# Patient Record
Sex: MALE | Race: WHITE | Employment: OTHER
[De-identification: names, ages, dates, MRNs, and addresses within clinical notes are randomized per-mention and may not be internally consistent; named-entity substitution may affect disease eponyms.]

---

## 2017-01-02 DIAGNOSIS — I50.32 CHRONIC DIASTOLIC CONGESTIVE HEART FAILURE (HCC): ICD-10-CM

## 2017-01-03 DIAGNOSIS — N40.1 BENIGN NON-NODULAR PROSTATIC HYPERPLASIA WITH LOWER URINARY TRACT SYMPTOMS: ICD-10-CM

## 2017-01-03 DIAGNOSIS — E55.9 VITAMIN D DEFICIENCY: ICD-10-CM

## 2017-01-03 DIAGNOSIS — D50.0 IRON DEFICIENCY ANEMIA DUE TO CHRONIC BLOOD LOSS: ICD-10-CM

## 2017-01-03 RX ORDER — PNV NO.95/FERROUS FUM/FOLIC AC 28MG-0.8MG
1 TABLET ORAL DAILY
Qty: 30 TABLET | Refills: 0 | Status: CANCELLED | OUTPATIENT
Start: 2017-01-03

## 2017-01-03 RX ORDER — CHOLECALCIFEROL (VITAMIN D3) 50 MCG
2000 TABLET ORAL DAILY
Qty: 30 TABLET | Refills: 0 | OUTPATIENT
Start: 2017-01-03

## 2017-01-03 RX ORDER — TAMSULOSIN HYDROCHLORIDE 0.4 MG/1
CAPSULE ORAL
Qty: 30 CAPSULE | Refills: 0 | Status: CANCELLED | OUTPATIENT
Start: 2017-01-03

## 2017-01-03 RX ORDER — CHOLECALCIFEROL (VITAMIN D3) 50 MCG
2000 TABLET ORAL DAILY
Qty: 30 TABLET | Refills: 0 | Status: CANCELLED | OUTPATIENT
Start: 2017-01-03

## 2017-01-03 RX ORDER — PNV NO.95/FERROUS FUM/FOLIC AC 28MG-0.8MG
1 TABLET ORAL DAILY
Qty: 90 TABLET | Refills: 3 | Status: SHIPPED | OUTPATIENT
Start: 2017-01-03 | End: 2017-01-27 | Stop reason: SDUPTHER

## 2017-01-03 RX ORDER — TAMSULOSIN HYDROCHLORIDE 0.4 MG/1
CAPSULE ORAL
Qty: 90 CAPSULE | Refills: 3 | Status: SHIPPED | OUTPATIENT
Start: 2017-01-03 | End: 2017-01-04

## 2017-01-04 ENCOUNTER — PATIENT MESSAGE (OUTPATIENT)
Dept: FAMILY MEDICINE CLINIC | Facility: CLINIC | Age: 52
End: 2017-01-04

## 2017-01-04 DIAGNOSIS — R39.9 LOWER URINARY TRACT SYMPTOMS (LUTS): Primary | ICD-10-CM

## 2017-01-09 ENCOUNTER — PATIENT MESSAGE (OUTPATIENT)
Dept: FAMILY MEDICINE CLINIC | Facility: CLINIC | Age: 52
End: 2017-01-09

## 2017-01-09 ENCOUNTER — TELEPHONE (OUTPATIENT)
Dept: FAMILY MEDICINE CLINIC | Facility: CLINIC | Age: 52
End: 2017-01-09

## 2017-01-09 DIAGNOSIS — F17.200 SMOKES AND MOTIVATED TO QUIT: Primary | ICD-10-CM

## 2017-01-09 DIAGNOSIS — R39.9 LOWER URINARY TRACT SYMPTOMS (LUTS): Primary | ICD-10-CM

## 2017-01-09 RX ORDER — VARENICLINE TARTRATE 25 MG
KIT ORAL
Qty: 53 EACH | Refills: 0 | Status: SHIPPED | OUTPATIENT
Start: 2017-01-09 | End: 2017-03-23 | Stop reason: ALTCHOICE

## 2017-01-10 PROBLEM — H90.6 MIXED CONDUCTIVE AND SENSORINEURAL HEARING LOSS OF BOTH EARS: Status: ACTIVE | Noted: 2017-01-10

## 2017-01-10 PROBLEM — H93.13 TINNITUS OF BOTH EARS: Status: ACTIVE | Noted: 2017-01-10

## 2017-01-16 DIAGNOSIS — Z29.9 PREVENTIVE MEASURE: ICD-10-CM

## 2017-01-16 DIAGNOSIS — H69.83 EUSTACHIAN TUBE DYSFUNCTION, BILATERAL: ICD-10-CM

## 2017-01-16 RX ORDER — CLOPIDOGREL BISULFATE 75 MG/1
TABLET ORAL
Qty: 90 TABLET | Refills: 3 | Status: SHIPPED | OUTPATIENT
Start: 2017-01-16 | End: 2018-01-28 | Stop reason: SDUPTHER

## 2017-01-16 RX ORDER — FLUTICASONE PROPIONATE 50 MCG
2 SPRAY, SUSPENSION (ML) NASAL DAILY
Qty: 1 BOTTLE | Refills: 3 | Status: SHIPPED | OUTPATIENT
Start: 2017-01-16

## 2017-01-18 DIAGNOSIS — G89.29 CHRONIC MIDLINE LOW BACK PAIN WITH LEFT-SIDED SCIATICA: ICD-10-CM

## 2017-01-18 DIAGNOSIS — M54.42 CHRONIC MIDLINE LOW BACK PAIN WITH LEFT-SIDED SCIATICA: ICD-10-CM

## 2017-01-18 RX ORDER — OXYCODONE HYDROCHLORIDE 10 MG/1
10 TABLET ORAL EVERY 8 HOURS PRN
Qty: 90 TABLET | Refills: 0 | Status: SHIPPED | OUTPATIENT
Start: 2017-01-18 | End: 2017-02-16 | Stop reason: SDUPTHER

## 2017-01-20 ENCOUNTER — PATIENT MESSAGE (OUTPATIENT)
Dept: FAMILY MEDICINE CLINIC | Facility: CLINIC | Age: 52
End: 2017-01-20

## 2017-01-22 ENCOUNTER — PATIENT MESSAGE (OUTPATIENT)
Dept: FAMILY MEDICINE CLINIC | Facility: CLINIC | Age: 52
End: 2017-01-22

## 2017-01-22 DIAGNOSIS — G47.01 INSOMNIA DUE TO MEDICAL CONDITION: ICD-10-CM

## 2017-01-22 DIAGNOSIS — F41.9 ANXIETY: ICD-10-CM

## 2017-01-22 RX ORDER — LORAZEPAM 1 MG/1
1 TABLET ORAL NIGHTLY PRN
Qty: 30 TABLET | Refills: 0 | Status: SHIPPED | OUTPATIENT
Start: 2017-01-22 | End: 2017-02-20 | Stop reason: SDUPTHER

## 2017-01-27 DIAGNOSIS — G47.01 INSOMNIA DUE TO MEDICAL CONDITION: ICD-10-CM

## 2017-01-27 DIAGNOSIS — F41.9 ANXIETY: ICD-10-CM

## 2017-01-27 DIAGNOSIS — D50.0 IRON DEFICIENCY ANEMIA DUE TO CHRONIC BLOOD LOSS: ICD-10-CM

## 2017-01-27 DIAGNOSIS — I50.32 CHRONIC DIASTOLIC CONGESTIVE HEART FAILURE (HCC): ICD-10-CM

## 2017-01-27 DIAGNOSIS — F33.1 MODERATE RECURRENT MAJOR DEPRESSION (HCC): ICD-10-CM

## 2017-01-27 DIAGNOSIS — E55.9 VITAMIN D DEFICIENCY: ICD-10-CM

## 2017-01-27 RX ORDER — FLUOXETINE HYDROCHLORIDE 20 MG/1
20 CAPSULE ORAL EVERY MORNING
Qty: 90 CAPSULE | Refills: 3 | Status: SHIPPED | OUTPATIENT
Start: 2017-01-27 | End: 2017-06-23 | Stop reason: ALTCHOICE

## 2017-01-27 RX ORDER — PNV NO.95/FERROUS FUM/FOLIC AC 28MG-0.8MG
1 TABLET ORAL DAILY
Qty: 90 TABLET | Refills: 3 | Status: SHIPPED | OUTPATIENT
Start: 2017-01-27 | End: 2017-11-17 | Stop reason: SDUPTHER

## 2017-01-27 RX ORDER — SPIRONOLACTONE 50 MG/1
50 TABLET, FILM COATED ORAL DAILY
Qty: 90 TABLET | Refills: 3 | Status: SHIPPED | OUTPATIENT
Start: 2017-01-27 | End: 2018-03-12 | Stop reason: SDUPTHER

## 2017-02-02 ENCOUNTER — TELEPHONE (OUTPATIENT)
Dept: FAMILY MEDICINE CLINIC | Facility: CLINIC | Age: 52
End: 2017-02-02

## 2017-02-02 ENCOUNTER — TELEPHONE (OUTPATIENT)
Dept: UROLOGY | Facility: CLINIC | Age: 52
End: 2017-02-02

## 2017-02-03 ENCOUNTER — TELEPHONE (OUTPATIENT)
Dept: UROLOGY | Facility: CLINIC | Age: 52
End: 2017-02-03

## 2017-02-03 ENCOUNTER — PATIENT MESSAGE (OUTPATIENT)
Dept: FAMILY MEDICINE CLINIC | Facility: CLINIC | Age: 52
End: 2017-02-03

## 2017-02-10 ENCOUNTER — HOSPITAL ENCOUNTER (OUTPATIENT)
Age: 52
Discharge: HOME | End: 2017-02-10
Payer: MEDICAID

## 2017-02-10 ENCOUNTER — HOSPITAL ENCOUNTER (OUTPATIENT)
Dept: GENERAL RADIOLOGY | Age: 52
Discharge: HOME | End: 2017-02-10
Payer: MEDICAID

## 2017-02-10 DIAGNOSIS — Z01.811 PRE-OP CHEST EXAM: ICD-10-CM

## 2017-02-10 PROCEDURE — 71020 XR CHEST STANDARD TWO VW: CPT

## 2017-02-16 DIAGNOSIS — G89.29 CHRONIC MIDLINE LOW BACK PAIN WITH LEFT-SIDED SCIATICA: ICD-10-CM

## 2017-02-16 DIAGNOSIS — M54.42 CHRONIC MIDLINE LOW BACK PAIN WITH LEFT-SIDED SCIATICA: ICD-10-CM

## 2017-02-16 RX ORDER — OXYCODONE HYDROCHLORIDE 10 MG/1
10 TABLET ORAL EVERY 8 HOURS PRN
Qty: 90 TABLET | Refills: 0 | Status: SHIPPED | OUTPATIENT
Start: 2017-02-16 | End: 2017-03-16 | Stop reason: SDUPTHER

## 2017-02-20 DIAGNOSIS — G47.01 INSOMNIA DUE TO MEDICAL CONDITION: ICD-10-CM

## 2017-02-20 DIAGNOSIS — F41.9 ANXIETY: ICD-10-CM

## 2017-02-20 RX ORDER — LORAZEPAM 1 MG/1
1 TABLET ORAL NIGHTLY PRN
Qty: 30 TABLET | Refills: 0 | Status: SHIPPED | OUTPATIENT
Start: 2017-02-20 | End: 2017-03-16 | Stop reason: SDUPTHER

## 2017-03-09 ENCOUNTER — HOSPITAL ENCOUNTER (OUTPATIENT)
Dept: OTHER | Age: 52
Discharge: HOME | End: 2017-03-09
Payer: MEDICAID

## 2017-03-09 VITALS
OXYGEN SATURATION: 97 % | DIASTOLIC BLOOD PRESSURE: 80 MMHG | WEIGHT: 315 LBS | SYSTOLIC BLOOD PRESSURE: 130 MMHG | RESPIRATION RATE: 20 BRPM | HEART RATE: 76 BPM | BODY MASS INDEX: 47.98 KG/M2

## 2017-03-09 LAB
ANION GAP SERPL CALCULATED.3IONS-SCNC: 13 MMOL/L (ref 9–17)
BNP INTERPRETATION: NORMAL
BUN BLDV-MCNC: 28 MG/DL (ref 6–20)
CHLORIDE BLD-SCNC: 99 MMOL/L (ref 98–107)
CO2: 27 MMOL/L (ref 20–31)
CREAT SERPL-MCNC: 1.32 MG/DL (ref 0.7–1.2)
GFR AFRICAN AMERICAN: >60 ML/MIN
GFR NON-AFRICAN AMERICAN: 57 ML/MIN
GFR SERPL CREATININE-BSD FRML MDRD: ABNORMAL ML/MIN/{1.73_M2}
GFR SERPL CREATININE-BSD FRML MDRD: ABNORMAL ML/MIN/{1.73_M2}
POTASSIUM SERPL-SCNC: 4.4 MMOL/L (ref 3.7–5.3)
PRO-BNP: 59 PG/ML
SODIUM BLD-SCNC: 139 MMOL/L (ref 135–144)

## 2017-03-09 PROCEDURE — 80051 ELECTROLYTE PANEL: CPT

## 2017-03-09 PROCEDURE — 36415 COLL VENOUS BLD VENIPUNCTURE: CPT

## 2017-03-09 PROCEDURE — 82565 ASSAY OF CREATININE: CPT

## 2017-03-09 PROCEDURE — 83880 ASSAY OF NATRIURETIC PEPTIDE: CPT

## 2017-03-09 PROCEDURE — 99211 OFF/OP EST MAY X REQ PHY/QHP: CPT

## 2017-03-09 PROCEDURE — 84520 ASSAY OF UREA NITROGEN: CPT

## 2017-03-16 DIAGNOSIS — G89.29 CHRONIC MIDLINE LOW BACK PAIN WITH LEFT-SIDED SCIATICA: ICD-10-CM

## 2017-03-16 DIAGNOSIS — E11.40 TYPE 2 DIABETES MELLITUS WITH DIABETIC NEUROPATHY, WITH LONG-TERM CURRENT USE OF INSULIN (HCC): ICD-10-CM

## 2017-03-16 DIAGNOSIS — G47.01 INSOMNIA DUE TO MEDICAL CONDITION: ICD-10-CM

## 2017-03-16 DIAGNOSIS — M54.42 CHRONIC MIDLINE LOW BACK PAIN WITH LEFT-SIDED SCIATICA: ICD-10-CM

## 2017-03-16 DIAGNOSIS — Z79.4 TYPE 2 DIABETES MELLITUS WITH DIABETIC NEUROPATHY, WITH LONG-TERM CURRENT USE OF INSULIN (HCC): ICD-10-CM

## 2017-03-16 DIAGNOSIS — K21.9 GASTROESOPHAGEAL REFLUX DISEASE WITHOUT ESOPHAGITIS: ICD-10-CM

## 2017-03-16 DIAGNOSIS — F41.9 ANXIETY: ICD-10-CM

## 2017-03-16 RX ORDER — OXYCODONE HYDROCHLORIDE 10 MG/1
10 TABLET ORAL EVERY 8 HOURS PRN
Qty: 10 TABLET | Refills: 0 | Status: SHIPPED | OUTPATIENT
Start: 2017-03-16 | End: 2017-03-19

## 2017-03-16 RX ORDER — PANTOPRAZOLE SODIUM 40 MG/1
40 TABLET, DELAYED RELEASE ORAL NIGHTLY
Qty: 90 TABLET | Refills: 1 | Status: SHIPPED | OUTPATIENT
Start: 2017-03-16 | End: 2017-06-09 | Stop reason: SDUPTHER

## 2017-03-16 RX ORDER — LORAZEPAM 1 MG/1
1 TABLET ORAL NIGHTLY PRN
Qty: 4 TABLET | Refills: 0 | Status: SHIPPED | OUTPATIENT
Start: 2017-03-16 | End: 2017-03-23 | Stop reason: SDUPTHER

## 2017-03-16 RX ORDER — GABAPENTIN 300 MG/1
300 CAPSULE ORAL 3 TIMES DAILY
Qty: 90 CAPSULE | Refills: 1 | Status: SHIPPED | OUTPATIENT
Start: 2017-03-16 | End: 2017-06-23 | Stop reason: ALTCHOICE

## 2017-03-20 ENCOUNTER — PATIENT MESSAGE (OUTPATIENT)
Dept: FAMILY MEDICINE CLINIC | Age: 52
End: 2017-03-20

## 2017-03-20 DIAGNOSIS — M54.42 CHRONIC MIDLINE LOW BACK PAIN WITH LEFT-SIDED SCIATICA: ICD-10-CM

## 2017-03-20 DIAGNOSIS — G89.29 CHRONIC MIDLINE LOW BACK PAIN WITH LEFT-SIDED SCIATICA: ICD-10-CM

## 2017-03-20 RX ORDER — OXYCODONE HYDROCHLORIDE 10 MG/1
10 TABLET ORAL EVERY 8 HOURS PRN
Qty: 90 TABLET | Refills: 0 | Status: SHIPPED | OUTPATIENT
Start: 2017-03-20 | End: 2017-04-17 | Stop reason: SDUPTHER

## 2017-03-23 ENCOUNTER — HOSPITAL ENCOUNTER (OUTPATIENT)
Age: 52
Discharge: HOME | End: 2017-03-23
Payer: COMMERCIAL

## 2017-03-23 ENCOUNTER — HOSPITAL ENCOUNTER (OUTPATIENT)
Dept: GENERAL RADIOLOGY | Age: 52
Discharge: HOME | End: 2017-03-23
Payer: COMMERCIAL

## 2017-03-23 ENCOUNTER — OFFICE VISIT (OUTPATIENT)
Dept: FAMILY MEDICINE CLINIC | Age: 52
End: 2017-03-23
Payer: COMMERCIAL

## 2017-03-23 VITALS
WEIGHT: 315 LBS | DIASTOLIC BLOOD PRESSURE: 62 MMHG | SYSTOLIC BLOOD PRESSURE: 138 MMHG | HEIGHT: 72 IN | BODY MASS INDEX: 42.66 KG/M2 | TEMPERATURE: 98.3 F | OXYGEN SATURATION: 93 % | HEART RATE: 86 BPM

## 2017-03-23 DIAGNOSIS — G47.01 INSOMNIA DUE TO MEDICAL CONDITION: ICD-10-CM

## 2017-03-23 DIAGNOSIS — E11.40 TYPE 2 DIABETES MELLITUS WITH DIABETIC NEUROPATHY, WITH LONG-TERM CURRENT USE OF INSULIN (HCC): ICD-10-CM

## 2017-03-23 DIAGNOSIS — Z79.4 TYPE 2 DIABETES MELLITUS WITH DIABETIC NEUROPATHY, WITH LONG-TERM CURRENT USE OF INSULIN (HCC): ICD-10-CM
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
## 2017-03-23 DIAGNOSIS — I50.32 CHRONIC DIASTOLIC CONGESTIVE HEART FAILURE (HCC): ICD-10-CM

## 2017-03-23 DIAGNOSIS — M54.9 CHRONIC BACK PAIN GREATER THAN 3 MONTHS DURATION: ICD-10-CM

## 2017-03-23 DIAGNOSIS — M75.02 ADHESIVE CAPSULITIS OF LEFT SHOULDER: Primary | ICD-10-CM

## 2017-03-23 DIAGNOSIS — I10 ESSENTIAL HYPERTENSION: ICD-10-CM

## 2017-03-23 DIAGNOSIS — M75.02 ADHESIVE CAPSULITIS OF LEFT SHOULDER: ICD-10-CM

## 2017-03-23 DIAGNOSIS — Z23 NEED FOR TDAP VACCINATION: ICD-10-CM

## 2017-03-23 DIAGNOSIS — K59.01 SLOW TRANSIT CONSTIPATION: ICD-10-CM

## 2017-03-23 DIAGNOSIS — N18.30 CKD (CHRONIC KIDNEY DISEASE) STAGE 3, GFR 30-59 ML/MIN (HCC): ICD-10-CM

## 2017-03-23 DIAGNOSIS — M48.061 STENOSIS, SPINAL, LUMBAR: ICD-10-CM

## 2017-03-23 DIAGNOSIS — F41.9 ANXIETY: ICD-10-CM

## 2017-03-23 DIAGNOSIS — G89.29 CHRONIC BACK PAIN GREATER THAN 3 MONTHS DURATION: ICD-10-CM

## 2017-03-23 LAB
ANION GAP SERPL CALCULATED.3IONS-SCNC: 16 MMOL/L (ref 9–17)
BUN BLDV-MCNC: 24 MG/DL (ref 6–20)
BUN/CREAT BLD: ABNORMAL (ref 9–20)
CALCIUM SERPL-MCNC: 8.7 MG/DL (ref 8.6–10.4)
CHLORIDE BLD-SCNC: 98 MMOL/L (ref 98–107)
CO2: 25 MMOL/L (ref 20–31)
CREAT SERPL-MCNC: 1.32 MG/DL (ref 0.7–1.2)
GFR AFRICAN AMERICAN: >60 ML/MIN
GFR NON-AFRICAN AMERICAN: 57 ML/MIN
GFR SERPL CREATININE-BSD FRML MDRD: ABNORMAL ML/MIN/{1.73_M2}
GFR SERPL CREATININE-BSD FRML MDRD: ABNORMAL ML/MIN/{1.73_M2}
GLUCOSE BLD-MCNC: 37 MG/DL (ref 70–99)
POTASSIUM SERPL-SCNC: 3.8 MMOL/L (ref 3.7–5.3)
SODIUM BLD-SCNC: 139 MMOL/L (ref 135–144)

## 2017-03-23 PROCEDURE — 99215 OFFICE O/P EST HI 40 MIN: CPT | Performed by: FAMILY MEDICINE

## 2017-03-23 PROCEDURE — 36415 COLL VENOUS BLD VENIPUNCTURE: CPT

## 2017-03-23 PROCEDURE — 73030 X-RAY EXAM OF SHOULDER: CPT

## 2017-03-23 PROCEDURE — 83036 HEMOGLOBIN GLYCOSYLATED A1C: CPT

## 2017-03-23 PROCEDURE — 80048 BASIC METABOLIC PNL TOTAL CA: CPT

## 2017-03-23 RX ORDER — LIDOCAINE 40 MG/G
CREAM TOPICAL
Qty: 76.5 G | Refills: 5 | Status: SHIPPED | OUTPATIENT
Start: 2017-03-23 | End: 2019-03-28

## 2017-03-23 RX ORDER — LORAZEPAM 1 MG/1
1 TABLET ORAL NIGHTLY PRN
Qty: 30 TABLET | Refills: 0 | Status: SHIPPED | OUTPATIENT
Start: 2017-03-23 | End: 2017-04-17 | Stop reason: SDUPTHER

## 2017-03-23 RX ORDER — DOCUSATE SODIUM 100 MG/1
100 CAPSULE, LIQUID FILLED ORAL 2 TIMES DAILY PRN
Qty: 60 CAPSULE | Refills: 3 | Status: SHIPPED | OUTPATIENT
Start: 2017-03-23 | End: 2017-07-20 | Stop reason: SDUPTHER

## 2017-03-23 ASSESSMENT — ENCOUNTER SYMPTOMS
DIARRHEA: 0
ABDOMINAL PAIN: 0
VOMITING: 0
NAUSEA: 0
WHEEZING: 0
SHORTNESS OF BREATH: 1
ABDOMINAL DISTENTION: 0
BACK PAIN: 1
CHEST TIGHTNESS: 0
COUGH: 0

## 2017-03-24 LAB
ESTIMATED AVERAGE GLUCOSE: 163 MG/DL
HBA1C MFR BLD: 7.3 % (ref 4–6)
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
## 2017-03-25 PROBLEM — K59.01 SLOW TRANSIT CONSTIPATION: Status: ACTIVE | Noted: 2017-03-25

## 2017-03-25 PROBLEM — M75.02 ADHESIVE CAPSULITIS OF LEFT SHOULDER: Status: ACTIVE | Noted: 2017-03-25

## 2017-03-25 ASSESSMENT — ENCOUNTER SYMPTOMS: CONSTIPATION: 1

## 2017-04-12 ENCOUNTER — OFFICE VISIT (OUTPATIENT)
Dept: ORTHOPEDIC SURGERY | Age: 52
End: 2017-04-12
Payer: COMMERCIAL

## 2017-04-12 VITALS — BODY MASS INDEX: 42.66 KG/M2 | WEIGHT: 315 LBS | HEIGHT: 72 IN

## 2017-04-12 DIAGNOSIS — M12.812 ROTATOR CUFF ARTHROPATHY, LEFT: Primary | ICD-10-CM

## 2017-04-12 PROCEDURE — 99203 OFFICE O/P NEW LOW 30 MIN: CPT | Performed by: ORTHOPAEDIC SURGERY

## 2017-04-17 DIAGNOSIS — G47.01 INSOMNIA DUE TO MEDICAL CONDITION: ICD-10-CM

## 2017-04-17 DIAGNOSIS — G89.29 CHRONIC MIDLINE LOW BACK PAIN WITH LEFT-SIDED SCIATICA: ICD-10-CM

## 2017-04-17 DIAGNOSIS — M54.42 CHRONIC MIDLINE LOW BACK PAIN WITH LEFT-SIDED SCIATICA: ICD-10-CM

## 2017-04-17 DIAGNOSIS — F41.9 ANXIETY: ICD-10-CM

## 2017-04-17 RX ORDER — LORAZEPAM 1 MG/1
1 TABLET ORAL NIGHTLY PRN
Qty: 30 TABLET | Refills: 0 | Status: SHIPPED | OUTPATIENT
Start: 2017-04-17 | End: 2017-05-25 | Stop reason: SDUPTHER

## 2017-04-17 RX ORDER — OXYCODONE HYDROCHLORIDE 10 MG/1
10 TABLET ORAL EVERY 8 HOURS PRN
Qty: 90 TABLET | Refills: 0 | Status: SHIPPED | OUTPATIENT
Start: 2017-04-17 | End: 2017-05-16 | Stop reason: SDUPTHER

## 2017-04-20 ENCOUNTER — HOSPITAL ENCOUNTER (OUTPATIENT)
Dept: PHYSICAL THERAPY | Age: 52
Setting detail: THERAPIES SERIES
Discharge: HOME | End: 2017-04-20
Payer: COMMERCIAL

## 2017-04-20 PROCEDURE — G8985 CARRY GOAL STATUS: HCPCS

## 2017-04-20 PROCEDURE — 97162 PT EVAL MOD COMPLEX 30 MIN: CPT

## 2017-04-20 PROCEDURE — G8984 CARRY CURRENT STATUS: HCPCS

## 2017-04-20 ASSESSMENT — PAIN DESCRIPTION - ORIENTATION: ORIENTATION: ANTERIOR;LEFT

## 2017-04-20 ASSESSMENT — PAIN DESCRIPTION - LOCATION: LOCATION: SHOULDER

## 2017-04-20 ASSESSMENT — PAIN SCALES - GENERAL: PAINLEVEL_OUTOF10: 7

## 2017-04-24 ENCOUNTER — APPOINTMENT (OUTPATIENT)
Dept: CT IMAGING | Age: 52
End: 2017-04-24
Payer: COMMERCIAL

## 2017-04-24 ENCOUNTER — HOSPITAL ENCOUNTER (EMERGENCY)
Age: 52
Discharge: HOME | End: 2017-04-24
Attending: EMERGENCY MEDICINE
Payer: COMMERCIAL

## 2017-04-24 VITALS
BODY MASS INDEX: 42.66 KG/M2 | HEART RATE: 92 BPM | WEIGHT: 315 LBS | OXYGEN SATURATION: 94 % | RESPIRATION RATE: 16 BRPM | TEMPERATURE: 97.8 F | SYSTOLIC BLOOD PRESSURE: 113 MMHG | HEIGHT: 72 IN | DIASTOLIC BLOOD PRESSURE: 50 MMHG

## 2017-04-24 DIAGNOSIS — K21.9 GASTROESOPHAGEAL REFLUX DISEASE WITHOUT ESOPHAGITIS: ICD-10-CM

## 2017-04-24 DIAGNOSIS — S09.90XA HEAD INJURY, INITIAL ENCOUNTER: Primary | ICD-10-CM

## 2017-04-24 PROCEDURE — 70450 CT HEAD/BRAIN W/O DYE: CPT

## 2017-04-24 PROCEDURE — 99284 EMERGENCY DEPT VISIT MOD MDM: CPT

## 2017-04-24 PROCEDURE — 6370000000 HC RX 637 (ALT 250 FOR IP): Performed by: EMERGENCY MEDICINE

## 2017-04-24 RX ORDER — MAGNESIUM HYDROXIDE/ALUMINUM HYDROXICE/SIMETHICONE 120; 1200; 1200 MG/30ML; MG/30ML; MG/30ML
15 SUSPENSION ORAL
Status: COMPLETED | OUTPATIENT
Start: 2017-04-24 | End: 2017-04-24

## 2017-04-24 RX ORDER — OXYCODONE HYDROCHLORIDE AND ACETAMINOPHEN 5; 325 MG/1; MG/1
2 TABLET ORAL ONCE
Status: COMPLETED | OUTPATIENT
Start: 2017-04-24 | End: 2017-04-24

## 2017-04-24 RX ADMIN — OXYCODONE HYDROCHLORIDE AND ACETAMINOPHEN 2 TABLET: 5; 325 TABLET ORAL at 11:05

## 2017-04-24 RX ADMIN — ALUMINUM HYDROXIDE, MAGNESIUM HYDROXIDE, AND SIMETHICONE 15 ML: 200; 200; 20 SUSPENSION ORAL at 11:05

## 2017-04-24 RX ADMIN — LIDOCAINE HYDROCHLORIDE 15 ML: 20 SOLUTION ORAL; TOPICAL at 11:05

## 2017-04-24 ASSESSMENT — ENCOUNTER SYMPTOMS
DIARRHEA: 0
NAUSEA: 0
COUGH: 0
COLOR CHANGE: 0
VOMITING: 0
EYE DISCHARGE: 0
ABDOMINAL PAIN: 1
WHEEZING: 0
BLOOD IN STOOL: 0
FACIAL SWELLING: 0
SHORTNESS OF BREATH: 0
EYE PAIN: 0
SINUS PRESSURE: 0
BACK PAIN: 0
EYE REDNESS: 0
CHEST TIGHTNESS: 0
TROUBLE SWALLOWING: 0
CONSTIPATION: 0
RHINORRHEA: 0
SORE THROAT: 0

## 2017-04-24 ASSESSMENT — PAIN SCALES - GENERAL
PAINLEVEL_OUTOF10: 9
PAINLEVEL_OUTOF10: 9

## 2017-04-25 ENCOUNTER — PATIENT MESSAGE (OUTPATIENT)
Dept: FAMILY MEDICINE CLINIC | Age: 52
End: 2017-04-25

## 2017-04-27 ENCOUNTER — HOSPITAL ENCOUNTER (OUTPATIENT)
Dept: GENERAL RADIOLOGY | Age: 52
Discharge: HOME | End: 2017-04-27
Payer: COMMERCIAL

## 2017-04-27 ENCOUNTER — OFFICE VISIT (OUTPATIENT)
Dept: FAMILY MEDICINE CLINIC | Age: 52
End: 2017-04-27
Payer: COMMERCIAL

## 2017-04-27 ENCOUNTER — HOSPITAL ENCOUNTER (OUTPATIENT)
Dept: OTHER | Age: 52
Discharge: HOME | End: 2017-04-27
Payer: COMMERCIAL

## 2017-04-27 ENCOUNTER — HOSPITAL ENCOUNTER (OUTPATIENT)
Age: 52
Discharge: HOME | End: 2017-04-27
Payer: COMMERCIAL

## 2017-04-27 VITALS
BODY MASS INDEX: 48.39 KG/M2 | SYSTOLIC BLOOD PRESSURE: 132 MMHG | RESPIRATION RATE: 16 BRPM | OXYGEN SATURATION: 96 % | HEART RATE: 77 BPM | DIASTOLIC BLOOD PRESSURE: 70 MMHG | WEIGHT: 315 LBS

## 2017-04-27 VITALS
HEIGHT: 71 IN | BODY MASS INDEX: 44.1 KG/M2 | SYSTOLIC BLOOD PRESSURE: 135 MMHG | DIASTOLIC BLOOD PRESSURE: 67 MMHG | HEART RATE: 78 BPM | TEMPERATURE: 97.6 F | WEIGHT: 315 LBS | OXYGEN SATURATION: 96 %

## 2017-04-27 DIAGNOSIS — J20.9 ACUTE BRONCHITIS DUE TO INFECTION: ICD-10-CM

## 2017-04-27 DIAGNOSIS — N18.30 CKD (CHRONIC KIDNEY DISEASE) STAGE 3, GFR 30-59 ML/MIN (HCC): ICD-10-CM

## 2017-04-27 DIAGNOSIS — R55 SYNCOPE, UNSPECIFIED SYNCOPE TYPE: ICD-10-CM

## 2017-04-27 DIAGNOSIS — J02.9 SORE THROAT: ICD-10-CM

## 2017-04-27 DIAGNOSIS — J20.9 ACUTE BRONCHITIS DUE TO INFECTION: Primary | ICD-10-CM

## 2017-04-27 DIAGNOSIS — H60.391 OTHER INFECTIVE CHRONIC OTITIS EXTERNA OF RIGHT EAR: ICD-10-CM

## 2017-04-27 DIAGNOSIS — I50.32 CHRONIC DIASTOLIC CONGESTIVE HEART FAILURE (HCC): ICD-10-CM

## 2017-04-27 LAB
ALBUMIN SERPL-MCNC: 4.1 G/DL (ref 3.5–5.2)
ALBUMIN/GLOBULIN RATIO: ABNORMAL (ref 1–2.5)
ALP BLD-CCNC: 82 U/L (ref 40–129)
ALT SERPL-CCNC: 15 U/L (ref 5–41)
ANION GAP SERPL CALCULATED.3IONS-SCNC: 13 MMOL/L (ref 9–17)
ANION GAP SERPL CALCULATED.3IONS-SCNC: 16 MMOL/L (ref 9–17)
AST SERPL-CCNC: 14 U/L
BILIRUB SERPL-MCNC: 0.62 MG/DL (ref 0.3–1.2)
BNP INTERPRETATION: NORMAL
BUN BLDV-MCNC: 26 MG/DL (ref 6–20)
BUN BLDV-MCNC: 26 MG/DL (ref 6–20)
BUN/CREAT BLD: ABNORMAL (ref 9–20)
CALCIUM SERPL-MCNC: 9.7 MG/DL (ref 8.6–10.4)
CHLORIDE BLD-SCNC: 96 MMOL/L (ref 98–107)
CHLORIDE BLD-SCNC: 96 MMOL/L (ref 98–107)
CO2: 27 MMOL/L (ref 20–31)
CO2: 29 MMOL/L (ref 20–31)
CREAT SERPL-MCNC: 1.42 MG/DL (ref 0.7–1.2)
CREAT SERPL-MCNC: 1.47 MG/DL (ref 0.7–1.2)
GFR AFRICAN AMERICAN: >60 ML/MIN
GFR AFRICAN AMERICAN: >60 ML/MIN
GFR NON-AFRICAN AMERICAN: 50 ML/MIN
GFR NON-AFRICAN AMERICAN: 52 ML/MIN
GFR SERPL CREATININE-BSD FRML MDRD: ABNORMAL ML/MIN/{1.73_M2}
GLUCOSE BLD-MCNC: 104 MG/DL (ref 70–99)
HCT VFR BLD CALC: 40.4 % (ref 41–53)
HEMOGLOBIN: 13.2 G/DL (ref 13.5–17.5)
MCH RBC QN AUTO: 26.6 PG (ref 26–34)
MCHC RBC AUTO-ENTMCNC: 32.7 G/DL (ref 31–37)
MCV RBC AUTO: 81.4 FL (ref 80–100)
PDW BLD-RTO: 16 % (ref 11.5–14.9)
PLATELET # BLD: 179 K/UL (ref 150–450)
PMV BLD AUTO: 9.2 FL (ref 6–12)
POTASSIUM SERPL-SCNC: 4.2 MMOL/L (ref 3.7–5.3)
POTASSIUM SERPL-SCNC: 4.5 MMOL/L (ref 3.7–5.3)
PRO-BNP: 89 PG/ML
RBC # BLD: 4.96 M/UL (ref 4.5–5.9)
S PYO AG THROAT QL: NORMAL
SODIUM BLD-SCNC: 138 MMOL/L (ref 135–144)
SODIUM BLD-SCNC: 139 MMOL/L (ref 135–144)
TOTAL PROTEIN: 7.4 G/DL (ref 6.4–8.3)
WBC # BLD: 10 K/UL (ref 3.5–11)

## 2017-04-27 PROCEDURE — 99211 OFF/OP EST MAY X REQ PHY/QHP: CPT

## 2017-04-27 PROCEDURE — 36415 COLL VENOUS BLD VENIPUNCTURE: CPT

## 2017-04-27 PROCEDURE — 83880 ASSAY OF NATRIURETIC PEPTIDE: CPT

## 2017-04-27 PROCEDURE — 99214 OFFICE O/P EST MOD 30 MIN: CPT | Performed by: FAMILY MEDICINE

## 2017-04-27 PROCEDURE — 80053 COMPREHEN METABOLIC PANEL: CPT

## 2017-04-27 PROCEDURE — 82565 ASSAY OF CREATININE: CPT

## 2017-04-27 PROCEDURE — 84520 ASSAY OF UREA NITROGEN: CPT

## 2017-04-27 PROCEDURE — 80051 ELECTROLYTE PANEL: CPT

## 2017-04-27 PROCEDURE — 71020 XR CHEST STANDARD TWO VW: CPT

## 2017-04-27 PROCEDURE — 87880 STREP A ASSAY W/OPTIC: CPT | Performed by: FAMILY MEDICINE

## 2017-04-27 PROCEDURE — 85027 COMPLETE CBC AUTOMATED: CPT

## 2017-04-27 RX ORDER — FLUTICASONE PROPIONATE 0.05 %
CREAM (GRAM) TOPICAL 2 TIMES DAILY
COMMUNITY
Start: 2017-04-19

## 2017-04-27 RX ORDER — GUAIFENESIN AND CODEINE PHOSPHATE 100; 10 MG/5ML; MG/5ML
5 SOLUTION ORAL EVERY 4 HOURS PRN
Qty: 140 ML | Refills: 0 | Status: SHIPPED | OUTPATIENT
Start: 2017-04-27 | End: 2017-05-04

## 2017-04-27 RX ORDER — AZITHROMYCIN 250 MG/1
TABLET, FILM COATED ORAL
Qty: 6 TABLET | Refills: 0 | Status: SHIPPED | OUTPATIENT
Start: 2017-04-27 | End: 2017-05-02

## 2017-04-27 ASSESSMENT — ENCOUNTER SYMPTOMS
COUGH: 1
SORE THROAT: 1
SHORTNESS OF BREATH: 1

## 2017-04-28 PROBLEM — H60.61 CHRONIC OTITIS EXTERNA OF RIGHT EAR: Status: ACTIVE | Noted: 2017-04-28

## 2017-04-28 PROBLEM — R55 SYNCOPE: Status: ACTIVE | Noted: 2017-04-28

## 2017-04-28 ASSESSMENT — ENCOUNTER SYMPTOMS
VOMITING: 0
DIARRHEA: 0
SINUS PRESSURE: 0
ABDOMINAL DISTENTION: 0
CONSTIPATION: 0
RHINORRHEA: 0
WHEEZING: 0
BACK PAIN: 1
NAUSEA: 0
ABDOMINAL PAIN: 0
APNEA: 1
CHEST TIGHTNESS: 0

## 2017-05-01 ENCOUNTER — HOSPITAL ENCOUNTER (OUTPATIENT)
Dept: VASCULAR LAB | Age: 52
Discharge: HOME | End: 2017-05-01
Payer: COMMERCIAL

## 2017-05-01 DIAGNOSIS — R55 SYNCOPE, UNSPECIFIED SYNCOPE TYPE: ICD-10-CM

## 2017-05-01 PROCEDURE — 93880 EXTRACRANIAL BILAT STUDY: CPT

## 2017-05-02 ENCOUNTER — HOSPITAL ENCOUNTER (OUTPATIENT)
Age: 52
Discharge: HOME | End: 2017-05-02
Payer: COMMERCIAL

## 2017-05-02 DIAGNOSIS — I12.9 BENIGN HYPERTENSION WITH CKD (CHRONIC KIDNEY DISEASE) STAGE III (HCC): ICD-10-CM

## 2017-05-02 DIAGNOSIS — N18.30 SECONDARY DIABETES MELLITUS WITH STAGE 3 CHRONIC KIDNEY DISEASE (HCC): ICD-10-CM

## 2017-05-02 DIAGNOSIS — N18.30 CKD (CHRONIC KIDNEY DISEASE) STAGE 3, GFR 30-59 ML/MIN (HCC): ICD-10-CM

## 2017-05-02 DIAGNOSIS — E13.22 SECONDARY DIABETES MELLITUS WITH STAGE 3 CHRONIC KIDNEY DISEASE (HCC): ICD-10-CM

## 2017-05-02 DIAGNOSIS — N18.30 BENIGN HYPERTENSION WITH CKD (CHRONIC KIDNEY DISEASE) STAGE III (HCC): ICD-10-CM

## 2017-05-02 LAB
ABSOLUTE EOS #: 0.2 K/UL (ref 0–0.4)
ABSOLUTE LYMPH #: 1.3 K/UL (ref 1–4.8)
ABSOLUTE MONO #: 0.7 K/UL (ref 0.1–1.3)
ANION GAP SERPL CALCULATED.3IONS-SCNC: 15 MMOL/L (ref 9–17)
BASOPHILS # BLD: 1 %
BASOPHILS ABSOLUTE: 0 K/UL (ref 0–0.2)
BUN BLDV-MCNC: 30 MG/DL (ref 6–20)
BUN/CREAT BLD: ABNORMAL (ref 9–20)
CALCIUM SERPL-MCNC: 9.1 MG/DL (ref 8.6–10.4)
CHLORIDE BLD-SCNC: 96 MMOL/L (ref 98–107)
CO2: 26 MMOL/L (ref 20–31)
CREAT SERPL-MCNC: 1.4 MG/DL (ref 0.7–1.2)
DIFFERENTIAL TYPE: ABNORMAL
EOSINOPHILS RELATIVE PERCENT: 2 %
GFR AFRICAN AMERICAN: >60 ML/MIN
GFR NON-AFRICAN AMERICAN: 53 ML/MIN
GFR SERPL CREATININE-BSD FRML MDRD: ABNORMAL ML/MIN/{1.73_M2}
GFR SERPL CREATININE-BSD FRML MDRD: ABNORMAL ML/MIN/{1.73_M2}
GLUCOSE BLD-MCNC: 153 MG/DL (ref 70–99)
HCT VFR BLD CALC: 39.1 % (ref 41–53)
HEMOGLOBIN: 12.7 G/DL (ref 13.5–17.5)
LYMPHOCYTES # BLD: 13 %
MAGNESIUM: 1.8 MG/DL (ref 1.6–2.6)
MCH RBC QN AUTO: 26.7 PG (ref 26–34)
MCHC RBC AUTO-ENTMCNC: 32.5 G/DL (ref 31–37)
MCV RBC AUTO: 82.3 FL (ref 80–100)
MONOCYTES # BLD: 7 %
PDW BLD-RTO: 15.4 % (ref 11.5–14.9)
PHOSPHORUS: 4.2 MG/DL (ref 2.5–4.5)
PLATELET # BLD: 168 K/UL (ref 150–450)
PLATELET ESTIMATE: ABNORMAL
PMV BLD AUTO: 9.2 FL (ref 6–12)
POTASSIUM SERPL-SCNC: 4.3 MMOL/L (ref 3.7–5.3)
RBC # BLD: 4.75 M/UL (ref 4.5–5.9)
RBC # BLD: ABNORMAL 10*6/UL
SEG NEUTROPHILS: 77 %
SEGMENTED NEUTROPHILS ABSOLUTE COUNT: 7.6 K/UL (ref 1.3–9.1)
SODIUM BLD-SCNC: 137 MMOL/L (ref 135–144)
WBC # BLD: 9.8 K/UL (ref 3.5–11)
WBC # BLD: ABNORMAL 10*3/UL

## 2017-05-02 PROCEDURE — 86038 ANTINUCLEAR ANTIBODIES: CPT

## 2017-05-02 PROCEDURE — 84100 ASSAY OF PHOSPHORUS: CPT

## 2017-05-02 PROCEDURE — 83516 IMMUNOASSAY NONANTIBODY: CPT

## 2017-05-02 PROCEDURE — 85025 COMPLETE CBC W/AUTO DIFF WBC: CPT

## 2017-05-02 PROCEDURE — 80048 BASIC METABOLIC PNL TOTAL CA: CPT

## 2017-05-02 PROCEDURE — 36415 COLL VENOUS BLD VENIPUNCTURE: CPT

## 2017-05-02 PROCEDURE — 83735 ASSAY OF MAGNESIUM: CPT

## 2017-05-03 LAB — ANTI-NUCLEAR ANTIBODY (ANA): ABNORMAL

## 2017-05-04 LAB
ANCA MYELOPEROXIDASE: 7 AU/ML
ANCA PROTEINASE 3: 7 AU/ML
ANCA REFERENCE RANGE:: NORMAL

## 2017-05-11 ENCOUNTER — HOSPITAL ENCOUNTER (OUTPATIENT)
Dept: PHYSICAL THERAPY | Age: 52
Setting detail: THERAPIES SERIES
Discharge: HOME | End: 2017-05-11
Payer: COMMERCIAL

## 2017-05-11 PROCEDURE — G0283 ELEC STIM OTHER THAN WOUND: HCPCS

## 2017-05-11 PROCEDURE — 97110 THERAPEUTIC EXERCISES: CPT

## 2017-05-11 ASSESSMENT — PAIN SCALES - GENERAL: PAINLEVEL_OUTOF10: 10

## 2017-05-11 ASSESSMENT — PAIN DESCRIPTION - LOCATION: LOCATION: SHOULDER

## 2017-05-11 ASSESSMENT — PAIN DESCRIPTION - ORIENTATION: ORIENTATION: LEFT;ANTERIOR

## 2017-05-12 ENCOUNTER — HOSPITAL ENCOUNTER (OUTPATIENT)
Dept: PHYSICAL THERAPY | Age: 52
Setting detail: THERAPIES SERIES
Discharge: HOME | End: 2017-05-12
Payer: COMMERCIAL

## 2017-05-12 PROCEDURE — G0283 ELEC STIM OTHER THAN WOUND: HCPCS

## 2017-05-12 ASSESSMENT — PAIN SCALES - GENERAL: PAINLEVEL_OUTOF10: 10

## 2017-05-12 ASSESSMENT — PAIN DESCRIPTION - ORIENTATION: ORIENTATION: LEFT;ANTERIOR

## 2017-05-12 ASSESSMENT — PAIN DESCRIPTION - LOCATION: LOCATION: SHOULDER

## 2017-05-15 ENCOUNTER — HOSPITAL ENCOUNTER (OUTPATIENT)
Dept: PHYSICAL THERAPY | Age: 52
Setting detail: THERAPIES SERIES
Discharge: HOME | End: 2017-05-15
Payer: COMMERCIAL

## 2017-05-15 PROCEDURE — G0283 ELEC STIM OTHER THAN WOUND: HCPCS

## 2017-05-15 PROCEDURE — 97140 MANUAL THERAPY 1/> REGIONS: CPT

## 2017-05-15 PROCEDURE — 97110 THERAPEUTIC EXERCISES: CPT

## 2017-05-15 ASSESSMENT — PAIN DESCRIPTION - LOCATION: LOCATION: SHOULDER

## 2017-05-15 ASSESSMENT — PAIN SCALES - GENERAL: PAINLEVEL_OUTOF10: 10

## 2017-05-16 DIAGNOSIS — G89.29 CHRONIC MIDLINE LOW BACK PAIN WITH LEFT-SIDED SCIATICA: ICD-10-CM

## 2017-05-16 DIAGNOSIS — M54.42 CHRONIC MIDLINE LOW BACK PAIN WITH LEFT-SIDED SCIATICA: ICD-10-CM

## 2017-05-16 RX ORDER — OXYCODONE HYDROCHLORIDE 10 MG/1
10 TABLET ORAL EVERY 8 HOURS PRN
Qty: 21 TABLET | Refills: 0 | Status: SHIPPED | OUTPATIENT
Start: 2017-05-16 | End: 2017-05-22 | Stop reason: SDUPTHER

## 2017-05-18 ENCOUNTER — HOSPITAL ENCOUNTER (OUTPATIENT)
Dept: PHYSICAL THERAPY | Age: 52
Setting detail: THERAPIES SERIES
Discharge: HOME | End: 2017-05-18
Payer: COMMERCIAL

## 2017-05-18 PROCEDURE — 97140 MANUAL THERAPY 1/> REGIONS: CPT

## 2017-05-18 PROCEDURE — G0283 ELEC STIM OTHER THAN WOUND: HCPCS

## 2017-05-18 PROCEDURE — 97110 THERAPEUTIC EXERCISES: CPT

## 2017-05-18 ASSESSMENT — PAIN DESCRIPTION - LOCATION: LOCATION: SHOULDER

## 2017-05-18 ASSESSMENT — PAIN SCALES - GENERAL: PAINLEVEL_OUTOF10: 8

## 2017-05-18 ASSESSMENT — PAIN DESCRIPTION - ORIENTATION: ORIENTATION: LEFT;ANTERIOR

## 2017-05-22 ENCOUNTER — HOSPITAL ENCOUNTER (OUTPATIENT)
Dept: PHYSICAL THERAPY | Age: 52
Setting detail: THERAPIES SERIES
Discharge: HOME | End: 2017-05-22
Payer: COMMERCIAL

## 2017-05-22 DIAGNOSIS — M54.42 CHRONIC MIDLINE LOW BACK PAIN WITH LEFT-SIDED SCIATICA: ICD-10-CM

## 2017-05-22 DIAGNOSIS — G89.29 CHRONIC MIDLINE LOW BACK PAIN WITH LEFT-SIDED SCIATICA: ICD-10-CM

## 2017-05-22 DIAGNOSIS — F41.9 ANXIETY: ICD-10-CM

## 2017-05-22 DIAGNOSIS — G47.01 INSOMNIA DUE TO MEDICAL CONDITION: ICD-10-CM

## 2017-05-22 PROCEDURE — 97140 MANUAL THERAPY 1/> REGIONS: CPT

## 2017-05-22 PROCEDURE — G0283 ELEC STIM OTHER THAN WOUND: HCPCS

## 2017-05-22 PROCEDURE — 97110 THERAPEUTIC EXERCISES: CPT

## 2017-05-22 RX ORDER — OXYCODONE HYDROCHLORIDE 10 MG/1
10 TABLET ORAL EVERY 8 HOURS PRN
Qty: 21 TABLET | Refills: 0 | OUTPATIENT
Start: 2017-05-22 | End: 2017-05-29

## 2017-05-22 RX ORDER — OXYCODONE HYDROCHLORIDE 10 MG/1
10 TABLET ORAL EVERY 8 HOURS PRN
Qty: 90 TABLET | Refills: 0 | Status: SHIPPED | OUTPATIENT
Start: 2017-05-22 | End: 2017-06-21 | Stop reason: SDUPTHER

## 2017-05-22 RX ORDER — LORAZEPAM 1 MG/1
1 TABLET ORAL NIGHTLY PRN
Qty: 30 TABLET | Refills: 0 | Status: CANCELLED | OUTPATIENT
Start: 2017-05-22

## 2017-05-22 ASSESSMENT — PAIN DESCRIPTION - ORIENTATION: ORIENTATION: LEFT;ANTERIOR

## 2017-05-22 ASSESSMENT — PAIN SCALES - GENERAL: PAINLEVEL_OUTOF10: 8

## 2017-05-22 ASSESSMENT — PAIN DESCRIPTION - LOCATION: LOCATION: SHOULDER

## 2017-05-24 ENCOUNTER — HOSPITAL ENCOUNTER (OUTPATIENT)
Dept: PHYSICAL THERAPY | Age: 52
Setting detail: THERAPIES SERIES
Discharge: HOME | End: 2017-05-24
Payer: COMMERCIAL

## 2017-05-24 PROCEDURE — G0283 ELEC STIM OTHER THAN WOUND: HCPCS

## 2017-05-24 PROCEDURE — 97140 MANUAL THERAPY 1/> REGIONS: CPT

## 2017-05-24 PROCEDURE — 97110 THERAPEUTIC EXERCISES: CPT

## 2017-05-25 ENCOUNTER — HOSPITAL ENCOUNTER (OUTPATIENT)
Dept: OTHER | Age: 52
Discharge: HOME | End: 2017-05-25
Payer: COMMERCIAL

## 2017-05-25 ENCOUNTER — OFFICE VISIT (OUTPATIENT)
Dept: FAMILY MEDICINE CLINIC | Age: 52
End: 2017-05-25
Payer: COMMERCIAL

## 2017-05-25 ENCOUNTER — HOSPITAL ENCOUNTER (OUTPATIENT)
Dept: VASCULAR LAB | Age: 52
Discharge: HOME | End: 2017-05-25
Payer: COMMERCIAL

## 2017-05-25 VITALS
TEMPERATURE: 96.5 F | DIASTOLIC BLOOD PRESSURE: 68 MMHG | WEIGHT: 315 LBS | RESPIRATION RATE: 20 BRPM | SYSTOLIC BLOOD PRESSURE: 128 MMHG | BODY MASS INDEX: 42.66 KG/M2 | HEIGHT: 72 IN | OXYGEN SATURATION: 95 % | HEART RATE: 80 BPM

## 2017-05-25 VITALS
OXYGEN SATURATION: 98 % | RESPIRATION RATE: 20 BRPM | DIASTOLIC BLOOD PRESSURE: 84 MMHG | SYSTOLIC BLOOD PRESSURE: 122 MMHG | BODY MASS INDEX: 49.06 KG/M2 | WEIGHT: 315 LBS | HEART RATE: 97 BPM

## 2017-05-25 DIAGNOSIS — F41.9 ANXIETY: ICD-10-CM

## 2017-05-25 DIAGNOSIS — L03.115 CELLULITIS OF BOTH LOWER EXTREMITIES: Primary | ICD-10-CM

## 2017-05-25 DIAGNOSIS — R60.0 BILATERAL LEG EDEMA: ICD-10-CM

## 2017-05-25 DIAGNOSIS — G47.01 INSOMNIA DUE TO MEDICAL CONDITION: ICD-10-CM

## 2017-05-25 DIAGNOSIS — L03.116 CELLULITIS OF BOTH LOWER EXTREMITIES: ICD-10-CM

## 2017-05-25 DIAGNOSIS — L03.115 CELLULITIS OF BOTH LOWER EXTREMITIES: ICD-10-CM

## 2017-05-25 DIAGNOSIS — I50.32 CHRONIC DIASTOLIC CONGESTIVE HEART FAILURE (HCC): ICD-10-CM

## 2017-05-25 DIAGNOSIS — L03.116 CELLULITIS OF BOTH LOWER EXTREMITIES: Primary | ICD-10-CM

## 2017-05-25 DIAGNOSIS — E78.5 HYPERLIPIDEMIA WITH TARGET LDL LESS THAN 70: ICD-10-CM

## 2017-05-25 LAB
ANION GAP SERPL CALCULATED.3IONS-SCNC: 13 MMOL/L (ref 9–17)
BNP INTERPRETATION: NORMAL
BUN BLDV-MCNC: 27 MG/DL (ref 6–20)
CHLORIDE BLD-SCNC: 100 MMOL/L (ref 98–107)
CO2: 27 MMOL/L (ref 20–31)
CREAT SERPL-MCNC: 1.41 MG/DL (ref 0.7–1.2)
GFR AFRICAN AMERICAN: >60 ML/MIN
GFR NON-AFRICAN AMERICAN: 53 ML/MIN
GFR SERPL CREATININE-BSD FRML MDRD: ABNORMAL ML/MIN/{1.73_M2}
GFR SERPL CREATININE-BSD FRML MDRD: ABNORMAL ML/MIN/{1.73_M2}
POTASSIUM SERPL-SCNC: 4.5 MMOL/L (ref 3.7–5.3)
PRO-BNP: 72 PG/ML
SODIUM BLD-SCNC: 140 MMOL/L (ref 135–144)

## 2017-05-25 PROCEDURE — 93970 EXTREMITY STUDY: CPT

## 2017-05-25 PROCEDURE — 84520 ASSAY OF UREA NITROGEN: CPT

## 2017-05-25 PROCEDURE — 82565 ASSAY OF CREATININE: CPT

## 2017-05-25 PROCEDURE — 99214 OFFICE O/P EST MOD 30 MIN: CPT | Performed by: FAMILY MEDICINE

## 2017-05-25 PROCEDURE — 99211 OFF/OP EST MAY X REQ PHY/QHP: CPT

## 2017-05-25 PROCEDURE — 83880 ASSAY OF NATRIURETIC PEPTIDE: CPT

## 2017-05-25 PROCEDURE — G0463 HOSPITAL OUTPT CLINIC VISIT: HCPCS

## 2017-05-25 PROCEDURE — 80051 ELECTROLYTE PANEL: CPT

## 2017-05-25 PROCEDURE — 36415 COLL VENOUS BLD VENIPUNCTURE: CPT

## 2017-05-25 RX ORDER — LISINOPRIL 5 MG/1
5 TABLET ORAL DAILY
Qty: 90 TABLET | Refills: 3 | Status: SHIPPED | OUTPATIENT
Start: 2017-05-25 | End: 2017-11-01 | Stop reason: SDUPTHER

## 2017-05-25 RX ORDER — PRAVASTATIN SODIUM 40 MG
40 TABLET ORAL EVERY EVENING
Qty: 90 TABLET | Refills: 3 | Status: SHIPPED | OUTPATIENT
Start: 2017-05-25 | End: 2018-05-17 | Stop reason: SDUPTHER

## 2017-05-25 RX ORDER — LORAZEPAM 1 MG/1
1 TABLET ORAL NIGHTLY PRN
Qty: 30 TABLET | Refills: 0 | Status: SHIPPED | OUTPATIENT
Start: 2017-05-25 | End: 2017-06-23 | Stop reason: ALTCHOICE

## 2017-05-25 RX ORDER — DOXYCYCLINE HYCLATE 100 MG
100 TABLET ORAL 2 TIMES DAILY
Qty: 20 TABLET | Refills: 0 | Status: SHIPPED | OUTPATIENT
Start: 2017-05-25 | End: 2017-06-04

## 2017-05-25 RX ORDER — CEPHALEXIN 500 MG/1
500 CAPSULE ORAL EVERY 6 HOURS
Qty: 40 CAPSULE | Refills: 0 | Status: SHIPPED | OUTPATIENT
Start: 2017-05-25 | End: 2017-06-04

## 2017-05-25 RX ORDER — FUROSEMIDE 40 MG/1
80-120 TABLET ORAL DAILY
Qty: 90 TABLET | Refills: 3 | Status: SHIPPED | OUTPATIENT
Start: 2017-05-25 | End: 2017-06-23 | Stop reason: ALTCHOICE

## 2017-05-25 ASSESSMENT — ENCOUNTER SYMPTOMS
COUGH: 0
VOMITING: 0
ABDOMINAL PAIN: 0
DIARRHEA: 0
ABDOMINAL DISTENTION: 1
WHEEZING: 0
CONSTIPATION: 0
SHORTNESS OF BREATH: 1
CHEST TIGHTNESS: 0
NAUSEA: 0

## 2017-05-25 ASSESSMENT — PAIN DESCRIPTION - ORIENTATION: ORIENTATION: LEFT;ANTERIOR

## 2017-05-25 ASSESSMENT — PAIN DESCRIPTION - LOCATION: LOCATION: SHOULDER

## 2017-05-25 ASSESSMENT — PAIN SCALES - GENERAL: PAINLEVEL_OUTOF10: 6

## 2017-05-30 ENCOUNTER — OFFICE VISIT (OUTPATIENT)
Dept: BEHAVIORAL/MENTAL HEALTH CLINIC | Age: 52
End: 2017-05-30
Payer: COMMERCIAL

## 2017-05-30 DIAGNOSIS — G47.01 INSOMNIA DUE TO MEDICAL CONDITION: Primary | ICD-10-CM

## 2017-05-30 PROCEDURE — 90791 PSYCH DIAGNOSTIC EVALUATION: CPT | Performed by: SOCIAL WORKER

## 2017-05-31 ENCOUNTER — HOSPITAL ENCOUNTER (OUTPATIENT)
Dept: PHYSICAL THERAPY | Age: 52
Setting detail: THERAPIES SERIES
Discharge: HOME | End: 2017-05-31
Payer: COMMERCIAL

## 2017-05-31 ENCOUNTER — HOSPITAL ENCOUNTER (OUTPATIENT)
Age: 52
Discharge: HOME | End: 2017-05-31
Payer: COMMERCIAL

## 2017-05-31 DIAGNOSIS — I50.32 CHRONIC DIASTOLIC CONGESTIVE HEART FAILURE (HCC): ICD-10-CM

## 2017-05-31 DIAGNOSIS — L03.116 CELLULITIS OF BOTH LOWER EXTREMITIES: ICD-10-CM

## 2017-05-31 DIAGNOSIS — R60.0 BILATERAL LEG EDEMA: ICD-10-CM

## 2017-05-31 DIAGNOSIS — L03.115 CELLULITIS OF BOTH LOWER EXTREMITIES: ICD-10-CM

## 2017-05-31 LAB
ABSOLUTE EOS #: 0.2 K/UL (ref 0–0.4)
ABSOLUTE LYMPH #: 1.6 K/UL (ref 1–4.8)
ABSOLUTE MONO #: 0.7 K/UL (ref 0.1–1.3)
ANION GAP SERPL CALCULATED.3IONS-SCNC: 14 MMOL/L (ref 9–17)
BASOPHILS # BLD: 1 %
BASOPHILS ABSOLUTE: 0.1 K/UL (ref 0–0.2)
BUN BLDV-MCNC: 32 MG/DL (ref 6–20)
BUN/CREAT BLD: ABNORMAL (ref 9–20)
CALCIUM SERPL-MCNC: 9.6 MG/DL (ref 8.6–10.4)
CHLORIDE BLD-SCNC: 96 MMOL/L (ref 98–107)
CO2: 27 MMOL/L (ref 20–31)
CREAT SERPL-MCNC: 1.38 MG/DL (ref 0.7–1.2)
DIFFERENTIAL TYPE: ABNORMAL
EOSINOPHILS RELATIVE PERCENT: 2 %
GFR AFRICAN AMERICAN: >60 ML/MIN
GFR NON-AFRICAN AMERICAN: 54 ML/MIN
GFR SERPL CREATININE-BSD FRML MDRD: ABNORMAL ML/MIN/{1.73_M2}
GFR SERPL CREATININE-BSD FRML MDRD: ABNORMAL ML/MIN/{1.73_M2}
GLUCOSE BLD-MCNC: 195 MG/DL (ref 70–99)
HCT VFR BLD CALC: 39.9 % (ref 41–53)
HEMOGLOBIN: 13.2 G/DL (ref 13.5–17.5)
LYMPHOCYTES # BLD: 17 %
MCH RBC QN AUTO: 26.7 PG (ref 26–34)
MCHC RBC AUTO-ENTMCNC: 33 G/DL (ref 31–37)
MCV RBC AUTO: 80.9 FL (ref 80–100)
MONOCYTES # BLD: 7 %
PDW BLD-RTO: 15.7 % (ref 11.5–14.9)
PLATELET # BLD: 190 K/UL (ref 150–450)
PLATELET ESTIMATE: ABNORMAL
PMV BLD AUTO: 8.9 FL (ref 6–12)
POTASSIUM SERPL-SCNC: 4.6 MMOL/L (ref 3.7–5.3)
RBC # BLD: 4.94 M/UL (ref 4.5–5.9)
RBC # BLD: ABNORMAL 10*6/UL
SEG NEUTROPHILS: 73 %
SEGMENTED NEUTROPHILS ABSOLUTE COUNT: 6.9 K/UL (ref 1.3–9.1)
SODIUM BLD-SCNC: 137 MMOL/L (ref 135–144)
WBC # BLD: 9.6 K/UL (ref 3.5–11)
WBC # BLD: ABNORMAL 10*3/UL

## 2017-05-31 PROCEDURE — G0283 ELEC STIM OTHER THAN WOUND: HCPCS

## 2017-05-31 PROCEDURE — 85025 COMPLETE CBC W/AUTO DIFF WBC: CPT

## 2017-05-31 PROCEDURE — 97110 THERAPEUTIC EXERCISES: CPT

## 2017-05-31 PROCEDURE — 36415 COLL VENOUS BLD VENIPUNCTURE: CPT

## 2017-05-31 PROCEDURE — 80048 BASIC METABOLIC PNL TOTAL CA: CPT

## 2017-05-31 ASSESSMENT — PAIN DESCRIPTION - ORIENTATION: ORIENTATION: LEFT;ANTERIOR

## 2017-05-31 ASSESSMENT — PAIN SCALES - GENERAL: PAINLEVEL_OUTOF10: 8

## 2017-05-31 ASSESSMENT — PAIN DESCRIPTION - LOCATION: LOCATION: SHOULDER

## 2017-06-01 ENCOUNTER — APPOINTMENT (OUTPATIENT)
Dept: GENERAL RADIOLOGY | Age: 52
End: 2017-06-01
Payer: COMMERCIAL

## 2017-06-01 ENCOUNTER — APPOINTMENT (OUTPATIENT)
Dept: CT IMAGING | Age: 52
End: 2017-06-01
Payer: COMMERCIAL

## 2017-06-01 ENCOUNTER — HOSPITAL ENCOUNTER (EMERGENCY)
Age: 52
Discharge: HOME | End: 2017-06-01
Attending: EMERGENCY MEDICINE
Payer: COMMERCIAL

## 2017-06-01 VITALS
DIASTOLIC BLOOD PRESSURE: 55 MMHG | OXYGEN SATURATION: 95 % | BODY MASS INDEX: 48.96 KG/M2 | WEIGHT: 315 LBS | RESPIRATION RATE: 16 BRPM | HEART RATE: 77 BPM | SYSTOLIC BLOOD PRESSURE: 144 MMHG | TEMPERATURE: 98 F

## 2017-06-01 DIAGNOSIS — S51.011A SKIN TEAR OF ELBOW WITHOUT COMPLICATION, RIGHT, INITIAL ENCOUNTER: Primary | ICD-10-CM

## 2017-06-01 DIAGNOSIS — S09.90XA HEAD INJURY, INITIAL ENCOUNTER: ICD-10-CM

## 2017-06-01 PROCEDURE — 70450 CT HEAD/BRAIN W/O DYE: CPT

## 2017-06-01 PROCEDURE — 73080 X-RAY EXAM OF ELBOW: CPT

## 2017-06-01 PROCEDURE — 99284 EMERGENCY DEPT VISIT MOD MDM: CPT

## 2017-06-01 ASSESSMENT — PAIN SCALES - GENERAL: PAINLEVEL_OUTOF10: 7

## 2017-06-01 ASSESSMENT — ENCOUNTER SYMPTOMS
VOMITING: 0
APNEA: 0
NAUSEA: 0
BLOOD IN STOOL: 0
COUGH: 0
DIARRHEA: 0
EYE DISCHARGE: 0
EYE PAIN: 0
SHORTNESS OF BREATH: 0

## 2017-06-01 ASSESSMENT — PAIN DESCRIPTION - LOCATION: LOCATION: BACK;SHOULDER

## 2017-06-01 ASSESSMENT — PAIN DESCRIPTION - DESCRIPTORS: DESCRIPTORS: CONSTANT

## 2017-06-01 ASSESSMENT — PAIN DESCRIPTION - FREQUENCY: FREQUENCY: CONTINUOUS

## 2017-06-02 ENCOUNTER — HOSPITAL ENCOUNTER (OUTPATIENT)
Dept: PHYSICAL THERAPY | Age: 52
Setting detail: THERAPIES SERIES
Discharge: HOME | End: 2017-06-02
Payer: COMMERCIAL

## 2017-06-02 PROCEDURE — 97110 THERAPEUTIC EXERCISES: CPT

## 2017-06-02 PROCEDURE — G8985 CARRY GOAL STATUS: HCPCS

## 2017-06-02 PROCEDURE — 97140 MANUAL THERAPY 1/> REGIONS: CPT

## 2017-06-02 PROCEDURE — G8984 CARRY CURRENT STATUS: HCPCS

## 2017-06-02 ASSESSMENT — PAIN SCALES - GENERAL: PAINLEVEL_OUTOF10: 8

## 2017-06-02 ASSESSMENT — PAIN DESCRIPTION - ORIENTATION: ORIENTATION: LEFT;ANTERIOR

## 2017-06-07 ENCOUNTER — OFFICE VISIT (OUTPATIENT)
Dept: ORTHOPEDIC SURGERY | Age: 52
End: 2017-06-07
Payer: COMMERCIAL

## 2017-06-07 VITALS — HEIGHT: 72 IN | WEIGHT: 315 LBS | BODY MASS INDEX: 42.66 KG/M2

## 2017-06-07 DIAGNOSIS — M75.112 INCOMPLETE TEAR OF LEFT ROTATOR CUFF: Primary | ICD-10-CM

## 2017-06-07 PROCEDURE — 99213 OFFICE O/P EST LOW 20 MIN: CPT | Performed by: ORTHOPAEDIC SURGERY

## 2017-06-07 PROCEDURE — 20610 DRAIN/INJ JOINT/BURSA W/O US: CPT | Performed by: ORTHOPAEDIC SURGERY

## 2017-06-07 RX ORDER — METHYLPREDNISOLONE ACETATE 40 MG/ML
40 INJECTION, SUSPENSION INTRA-ARTICULAR; INTRALESIONAL; INTRAMUSCULAR; SOFT TISSUE ONCE
Status: COMPLETED | OUTPATIENT
Start: 2017-06-07 | End: 2017-06-07

## 2017-06-07 RX ADMIN — METHYLPREDNISOLONE ACETATE 40 MG: 40 INJECTION, SUSPENSION INTRA-ARTICULAR; INTRALESIONAL; INTRAMUSCULAR; SOFT TISSUE at 15:26

## 2017-06-09 DIAGNOSIS — K21.9 GASTROESOPHAGEAL REFLUX DISEASE WITHOUT ESOPHAGITIS: ICD-10-CM

## 2017-06-09 RX ORDER — PANTOPRAZOLE SODIUM 40 MG/1
40 TABLET, DELAYED RELEASE ORAL NIGHTLY
Qty: 90 TABLET | Refills: 3 | Status: SHIPPED | OUTPATIENT
Start: 2017-06-09 | End: 2018-01-22 | Stop reason: SDUPTHER

## 2017-06-15 DIAGNOSIS — J44.9 CHRONIC OBSTRUCTIVE PULMONARY DISEASE, UNSPECIFIED COPD TYPE (HCC): ICD-10-CM

## 2017-06-15 DIAGNOSIS — E11.40 TYPE 2 DIABETES MELLITUS WITH DIABETIC NEUROPATHY (HCC): ICD-10-CM

## 2017-06-15 RX ORDER — FLUTICASONE PROPIONATE AND SALMETEROL XINAFOATE 230; 21 UG/1; UG/1
AEROSOL, METERED RESPIRATORY (INHALATION)
Qty: 12 G | Refills: 11 | Status: SHIPPED | OUTPATIENT
Start: 2017-06-15 | End: 2017-11-17 | Stop reason: SDUPTHER

## 2017-06-15 RX ORDER — LANCETS
EACH MISCELLANEOUS
Qty: 100 EACH | Refills: 11 | Status: SHIPPED | OUTPATIENT
Start: 2017-06-15 | End: 2018-07-05 | Stop reason: SDUPTHER

## 2017-06-19 RX ORDER — TIOTROPIUM BROMIDE 18 UG/1
CAPSULE ORAL; RESPIRATORY (INHALATION)
Qty: 60 CAPSULE | Refills: 11 | Status: SHIPPED | OUTPATIENT
Start: 2017-06-19 | End: 2017-11-17 | Stop reason: SDUPTHER

## 2017-06-21 ENCOUNTER — HOSPITAL ENCOUNTER (OUTPATIENT)
Dept: OTHER | Age: 52
Discharge: HOME | End: 2017-06-21
Payer: COMMERCIAL

## 2017-06-21 VITALS
HEART RATE: 85 BPM | DIASTOLIC BLOOD PRESSURE: 78 MMHG | BODY MASS INDEX: 49.74 KG/M2 | WEIGHT: 315 LBS | OXYGEN SATURATION: 97 % | SYSTOLIC BLOOD PRESSURE: 124 MMHG | RESPIRATION RATE: 20 BRPM

## 2017-06-21 DIAGNOSIS — G47.01 INSOMNIA DUE TO MEDICAL CONDITION: ICD-10-CM

## 2017-06-21 DIAGNOSIS — M54.42 CHRONIC MIDLINE LOW BACK PAIN WITH LEFT-SIDED SCIATICA: ICD-10-CM

## 2017-06-21 DIAGNOSIS — G89.29 CHRONIC MIDLINE LOW BACK PAIN WITH LEFT-SIDED SCIATICA: ICD-10-CM

## 2017-06-21 LAB
ANION GAP SERPL CALCULATED.3IONS-SCNC: 15 MMOL/L (ref 9–17)
BNP INTERPRETATION: NORMAL
BUN BLDV-MCNC: 31 MG/DL (ref 6–20)
CHLORIDE BLD-SCNC: 98 MMOL/L (ref 98–107)
CO2: 27 MMOL/L (ref 20–31)
CREAT SERPL-MCNC: 1.69 MG/DL (ref 0.7–1.2)
GFR AFRICAN AMERICAN: 52 ML/MIN
GFR NON-AFRICAN AMERICAN: 43 ML/MIN
GFR SERPL CREATININE-BSD FRML MDRD: ABNORMAL ML/MIN/{1.73_M2}
GFR SERPL CREATININE-BSD FRML MDRD: ABNORMAL ML/MIN/{1.73_M2}
POTASSIUM SERPL-SCNC: 4.2 MMOL/L (ref 3.7–5.3)
PRO-BNP: 116 PG/ML
SODIUM BLD-SCNC: 140 MMOL/L (ref 135–144)

## 2017-06-21 PROCEDURE — 80051 ELECTROLYTE PANEL: CPT

## 2017-06-21 PROCEDURE — 83880 ASSAY OF NATRIURETIC PEPTIDE: CPT

## 2017-06-21 PROCEDURE — 82565 ASSAY OF CREATININE: CPT

## 2017-06-21 PROCEDURE — 36415 COLL VENOUS BLD VENIPUNCTURE: CPT

## 2017-06-21 PROCEDURE — 84520 ASSAY OF UREA NITROGEN: CPT

## 2017-06-21 PROCEDURE — 99211 OFF/OP EST MAY X REQ PHY/QHP: CPT

## 2017-06-21 PROCEDURE — G0463 HOSPITAL OUTPT CLINIC VISIT: HCPCS

## 2017-06-21 RX ORDER — OXYCODONE HYDROCHLORIDE 10 MG/1
10 TABLET ORAL EVERY 8 HOURS PRN
Qty: 90 TABLET | Refills: 0 | Status: SHIPPED | OUTPATIENT
Start: 2017-06-21 | End: 2017-07-20 | Stop reason: SDUPTHER

## 2017-06-23 ENCOUNTER — OFFICE VISIT (OUTPATIENT)
Dept: FAMILY MEDICINE CLINIC | Age: 52
End: 2017-06-23
Payer: COMMERCIAL

## 2017-06-23 VITALS
HEART RATE: 79 BPM | OXYGEN SATURATION: 96 % | SYSTOLIC BLOOD PRESSURE: 137 MMHG | WEIGHT: 315 LBS | TEMPERATURE: 97.1 F | BODY MASS INDEX: 42.66 KG/M2 | HEIGHT: 72 IN | RESPIRATION RATE: 20 BRPM | DIASTOLIC BLOOD PRESSURE: 69 MMHG

## 2017-06-23 DIAGNOSIS — I50.32 CHRONIC DIASTOLIC CONGESTIVE HEART FAILURE (HCC): Primary | ICD-10-CM

## 2017-06-23 DIAGNOSIS — Z79.4 TYPE 2 DIABETES MELLITUS WITH DIABETIC NEUROPATHY, WITH LONG-TERM CURRENT USE OF INSULIN (HCC): ICD-10-CM

## 2017-06-23 DIAGNOSIS — M54.42 CHRONIC MIDLINE LOW BACK PAIN WITH LEFT-SIDED SCIATICA: ICD-10-CM

## 2017-06-23 DIAGNOSIS — F33.2 MDD (MAJOR DEPRESSIVE DISORDER), RECURRENT SEVERE, WITHOUT PSYCHOSIS (HCC): ICD-10-CM

## 2017-06-23 DIAGNOSIS — R60.0 BILATERAL LEG EDEMA: ICD-10-CM

## 2017-06-23 DIAGNOSIS — G47.01 INSOMNIA DUE TO MEDICAL CONDITION: ICD-10-CM

## 2017-06-23 DIAGNOSIS — N18.30 CKD (CHRONIC KIDNEY DISEASE) STAGE 3, GFR 30-59 ML/MIN (HCC): ICD-10-CM

## 2017-06-23 DIAGNOSIS — G89.29 CHRONIC MIDLINE LOW BACK PAIN WITH LEFT-SIDED SCIATICA: ICD-10-CM

## 2017-06-23 DIAGNOSIS — F41.9 ANXIETY: ICD-10-CM

## 2017-06-23 DIAGNOSIS — R63.5 UNINTENDED WEIGHT GAIN: ICD-10-CM

## 2017-06-23 DIAGNOSIS — E11.40 TYPE 2 DIABETES MELLITUS WITH DIABETIC NEUROPATHY, WITH LONG-TERM CURRENT USE OF INSULIN (HCC): ICD-10-CM

## 2017-06-23 PROCEDURE — 99215 OFFICE O/P EST HI 40 MIN: CPT | Performed by: FAMILY MEDICINE

## 2017-06-23 PROCEDURE — 96127 BRIEF EMOTIONAL/BEHAV ASSMT: CPT | Performed by: FAMILY MEDICINE

## 2017-06-23 RX ORDER — VENLAFAXINE 50 MG/1
50 TABLET ORAL 2 TIMES DAILY
Qty: 60 TABLET | Refills: 0 | Status: SHIPPED | OUTPATIENT
Start: 2017-06-23 | End: 2017-07-05 | Stop reason: SDUPTHER

## 2017-06-23 RX ORDER — BUMETANIDE 1 MG/1
1 TABLET ORAL 2 TIMES DAILY
Qty: 60 TABLET | Refills: 0 | Status: SHIPPED | OUTPATIENT
Start: 2017-06-23 | End: 2017-06-23 | Stop reason: SDUPTHER

## 2017-06-23 RX ORDER — AMMONIUM LACTATE 12 G/100G
CREAM TOPICAL
Qty: 385 G | Refills: 4 | Status: SHIPPED | OUTPATIENT
Start: 2017-06-23 | End: 2017-07-23

## 2017-06-23 RX ORDER — BUMETANIDE 1 MG/1
1.5 TABLET ORAL 2 TIMES DAILY
Qty: 90 TABLET | Refills: 0 | Status: SHIPPED | OUTPATIENT
Start: 2017-06-23 | End: 2017-07-05 | Stop reason: SDUPTHER

## 2017-06-23 ASSESSMENT — ENCOUNTER SYMPTOMS
ABDOMINAL PAIN: 0
COUGH: 0
NAUSEA: 0
CHEST TIGHTNESS: 1
DIARRHEA: 0
VOMITING: 0
WHEEZING: 0
BACK PAIN: 1
SHORTNESS OF BREATH: 1
CONSTIPATION: 0

## 2017-06-23 ASSESSMENT — PATIENT HEALTH QUESTIONNAIRE - PHQ9
9. THOUGHTS THAT YOU WOULD BE BETTER OFF DEAD, OR OF HURTING YOURSELF: 0
SUM OF ALL RESPONSES TO PHQ9 QUESTIONS 1 & 2: 5
4. FEELING TIRED OR HAVING LITTLE ENERGY: 3
10. IF YOU CHECKED OFF ANY PROBLEMS, HOW DIFFICULT HAVE THESE PROBLEMS MADE IT FOR YOU TO DO YOUR WORK, TAKE CARE OF THINGS AT HOME, OR GET ALONG WITH OTHER PEOPLE: 1
2. FEELING DOWN, DEPRESSED OR HOPELESS: 3
8. MOVING OR SPEAKING SO SLOWLY THAT OTHER PEOPLE COULD HAVE NOTICED. OR THE OPPOSITE, BEING SO FIGETY OR RESTLESS THAT YOU HAVE BEEN MOVING AROUND A LOT MORE THAN USUAL: 1
1. LITTLE INTEREST OR PLEASURE IN DOING THINGS: 2
6. FEELING BAD ABOUT YOURSELF - OR THAT YOU ARE A FAILURE OR HAVE LET YOURSELF OR YOUR FAMILY DOWN: 3
7. TROUBLE CONCENTRATING ON THINGS, SUCH AS READING THE NEWSPAPER OR WATCHING TELEVISION: 3
SUM OF ALL RESPONSES TO PHQ QUESTIONS 1-9: 21
5. POOR APPETITE OR OVEREATING: 3
3. TROUBLE FALLING OR STAYING ASLEEP: 3

## 2017-06-23 ASSESSMENT — ANXIETY QUESTIONNAIRES
7. FEELING AFRAID AS IF SOMETHING AWFUL MIGHT HAPPEN: 3-NEARLY EVERY DAY
6. BECOMING EASILY ANNOYED OR IRRITABLE: 3-NEARLY EVERY DAY
3. WORRYING TOO MUCH ABOUT DIFFERENT THINGS: 3-NEARLY EVERY DAY
GAD7 TOTAL SCORE: 21
5. BEING SO RESTLESS THAT IT IS HARD TO SIT STILL: 3-NEARLY EVERY DAY
1. FEELING NERVOUS, ANXIOUS, OR ON EDGE: 3-NEARLY EVERY DAY
4. TROUBLE RELAXING: 3-NEARLY EVERY DAY
2. NOT BEING ABLE TO STOP OR CONTROL WORRYING: 3-NEARLY EVERY DAY

## 2017-06-24 ASSESSMENT — ENCOUNTER SYMPTOMS
APNEA: 1
ABDOMINAL DISTENTION: 1

## 2017-07-03 ENCOUNTER — HOSPITAL ENCOUNTER (OUTPATIENT)
Age: 52
Discharge: HOME | End: 2017-07-03
Payer: COMMERCIAL

## 2017-07-03 DIAGNOSIS — N18.30 CKD (CHRONIC KIDNEY DISEASE) STAGE 3, GFR 30-59 ML/MIN (HCC): ICD-10-CM

## 2017-07-03 DIAGNOSIS — I50.32 CHRONIC DIASTOLIC CONGESTIVE HEART FAILURE (HCC): ICD-10-CM

## 2017-07-03 LAB
ANION GAP SERPL CALCULATED.3IONS-SCNC: 15 MMOL/L (ref 9–17)
BUN BLDV-MCNC: 27 MG/DL (ref 6–20)
BUN/CREAT BLD: ABNORMAL (ref 9–20)
CALCIUM SERPL-MCNC: 9.2 MG/DL (ref 8.6–10.4)
CHLORIDE BLD-SCNC: 94 MMOL/L (ref 98–107)
CO2: 28 MMOL/L (ref 20–31)
CREAT SERPL-MCNC: 1.47 MG/DL (ref 0.7–1.2)
GFR AFRICAN AMERICAN: >60 ML/MIN
GFR NON-AFRICAN AMERICAN: 50 ML/MIN
GFR SERPL CREATININE-BSD FRML MDRD: ABNORMAL ML/MIN/{1.73_M2}
GFR SERPL CREATININE-BSD FRML MDRD: ABNORMAL ML/MIN/{1.73_M2}
GLUCOSE BLD-MCNC: 255 MG/DL (ref 70–99)
POTASSIUM SERPL-SCNC: 4.7 MMOL/L (ref 3.7–5.3)
SODIUM BLD-SCNC: 137 MMOL/L (ref 135–144)

## 2017-07-03 PROCEDURE — 80048 BASIC METABOLIC PNL TOTAL CA: CPT

## 2017-07-03 PROCEDURE — 36415 COLL VENOUS BLD VENIPUNCTURE: CPT

## 2017-07-05 ENCOUNTER — OFFICE VISIT (OUTPATIENT)
Dept: FAMILY MEDICINE CLINIC | Age: 52
End: 2017-07-05
Payer: COMMERCIAL

## 2017-07-05 ENCOUNTER — HOSPITAL ENCOUNTER (OUTPATIENT)
Dept: VASCULAR LAB | Age: 52
Discharge: HOME | End: 2017-07-05
Payer: COMMERCIAL

## 2017-07-05 ENCOUNTER — HOSPITAL ENCOUNTER (OUTPATIENT)
Age: 52
Discharge: HOME | End: 2017-07-05
Payer: COMMERCIAL

## 2017-07-05 ENCOUNTER — OFFICE VISIT (OUTPATIENT)
Dept: BEHAVIORAL/MENTAL HEALTH CLINIC | Age: 52
End: 2017-07-05
Payer: COMMERCIAL

## 2017-07-05 VITALS
SYSTOLIC BLOOD PRESSURE: 148 MMHG | HEIGHT: 72 IN | HEART RATE: 92 BPM | DIASTOLIC BLOOD PRESSURE: 72 MMHG | BODY MASS INDEX: 42.66 KG/M2 | OXYGEN SATURATION: 95 % | TEMPERATURE: 96.9 F | RESPIRATION RATE: 22 BRPM | WEIGHT: 315 LBS

## 2017-07-05 DIAGNOSIS — I50.32 CHRONIC DIASTOLIC CONGESTIVE HEART FAILURE (HCC): ICD-10-CM

## 2017-07-05 DIAGNOSIS — L03.116 BILATERAL LOWER LEG CELLULITIS: Primary | ICD-10-CM

## 2017-07-05 DIAGNOSIS — G47.33 OSA (OBSTRUCTIVE SLEEP APNEA): ICD-10-CM

## 2017-07-05 DIAGNOSIS — L03.116 BILATERAL LOWER LEG CELLULITIS: ICD-10-CM

## 2017-07-05 DIAGNOSIS — F33.2 MDD (MAJOR DEPRESSIVE DISORDER), RECURRENT SEVERE, WITHOUT PSYCHOSIS (HCC): ICD-10-CM

## 2017-07-05 DIAGNOSIS — F41.9 ANXIETY: Primary | ICD-10-CM

## 2017-07-05 DIAGNOSIS — L03.115 BILATERAL LOWER LEG CELLULITIS: Primary | ICD-10-CM

## 2017-07-05 DIAGNOSIS — G47.01 INSOMNIA DUE TO MEDICAL CONDITION: ICD-10-CM

## 2017-07-05 DIAGNOSIS — R60.0 BILATERAL LEG EDEMA: ICD-10-CM

## 2017-07-05 DIAGNOSIS — L03.115 BILATERAL LOWER LEG CELLULITIS: ICD-10-CM

## 2017-07-05 DIAGNOSIS — F41.9 ANXIETY: ICD-10-CM

## 2017-07-05 DIAGNOSIS — B35.1 ONYCHOMYCOSIS: ICD-10-CM

## 2017-07-05 DIAGNOSIS — B35.3 TINEA PEDIS OF BOTH FEET: ICD-10-CM

## 2017-07-05 LAB
ABSOLUTE EOS #: 0.2 K/UL (ref 0–0.4)
ABSOLUTE LYMPH #: 1.7 K/UL (ref 1–4.8)
ABSOLUTE MONO #: 0.7 K/UL (ref 0.1–1.3)
BASOPHILS # BLD: 0 %
BASOPHILS ABSOLUTE: 0 K/UL (ref 0–0.2)
DIFFERENTIAL TYPE: ABNORMAL
EOSINOPHILS RELATIVE PERCENT: 3 %
HCT VFR BLD CALC: 40 % (ref 41–53)
HEMOGLOBIN: 12.9 G/DL (ref 13.5–17.5)
LYMPHOCYTES # BLD: 17 %
MCH RBC QN AUTO: 27 PG (ref 26–34)
MCHC RBC AUTO-ENTMCNC: 32.2 G/DL (ref 31–37)
MCV RBC AUTO: 83.7 FL (ref 80–100)
MONOCYTES # BLD: 8 %
PDW BLD-RTO: 16.7 % (ref 11.5–14.9)
PLATELET # BLD: 176 K/UL (ref 150–450)
PLATELET ESTIMATE: ABNORMAL
PMV BLD AUTO: 9.7 FL (ref 6–12)
RBC # BLD: 4.78 M/UL (ref 4.5–5.9)
RBC # BLD: ABNORMAL 10*6/UL
SEG NEUTROPHILS: 72 %
SEGMENTED NEUTROPHILS ABSOLUTE COUNT: 7.1 K/UL (ref 1.3–9.1)
WBC # BLD: 9.7 K/UL (ref 3.5–11)
WBC # BLD: ABNORMAL 10*3/UL

## 2017-07-05 PROCEDURE — 90832 PSYTX W PT 30 MINUTES: CPT | Performed by: SOCIAL WORKER

## 2017-07-05 PROCEDURE — 93970 EXTREMITY STUDY: CPT

## 2017-07-05 PROCEDURE — 36415 COLL VENOUS BLD VENIPUNCTURE: CPT

## 2017-07-05 PROCEDURE — 85025 COMPLETE CBC W/AUTO DIFF WBC: CPT

## 2017-07-05 PROCEDURE — 99214 OFFICE O/P EST MOD 30 MIN: CPT | Performed by: FAMILY MEDICINE

## 2017-07-05 RX ORDER — TERBINAFINE HYDROCHLORIDE 250 MG/1
250 TABLET ORAL DAILY
Qty: 84 TABLET | Refills: 0 | Status: SHIPPED | OUTPATIENT
Start: 2017-07-05 | End: 2017-09-27

## 2017-07-05 RX ORDER — VENLAFAXINE 75 MG/1
75 TABLET ORAL 2 TIMES DAILY
Qty: 60 TABLET | Refills: 1 | Status: SHIPPED | OUTPATIENT
Start: 2017-07-05 | End: 2017-09-19 | Stop reason: SDUPTHER

## 2017-07-05 RX ORDER — BUMETANIDE 2 MG/1
2 TABLET ORAL 2 TIMES DAILY
Qty: 60 TABLET | Refills: 2 | Status: SHIPPED | OUTPATIENT
Start: 2017-07-05 | End: 2017-07-07 | Stop reason: SDUPTHER

## 2017-07-05 RX ORDER — CLINDAMYCIN HYDROCHLORIDE 300 MG/1
300 CAPSULE ORAL 4 TIMES DAILY
Qty: 40 CAPSULE | Refills: 0 | Status: ON HOLD | OUTPATIENT
Start: 2017-07-05 | End: 2017-07-10

## 2017-07-05 ASSESSMENT — ENCOUNTER SYMPTOMS
VOMITING: 0
CHEST TIGHTNESS: 0
ABDOMINAL PAIN: 0
WHEEZING: 0
DIARRHEA: 0
SHORTNESS OF BREATH: 1
COUGH: 0
ABDOMINAL DISTENTION: 0
APNEA: 1
NAUSEA: 0
CONSTIPATION: 0

## 2017-07-10 ENCOUNTER — HOSPITAL ENCOUNTER (INPATIENT)
Age: 52
LOS: 2 days | Discharge: HOME | DRG: 603 | End: 2017-07-12
Attending: EMERGENCY MEDICINE | Admitting: INTERNAL MEDICINE
Payer: COMMERCIAL

## 2017-07-10 DIAGNOSIS — L03.116 BILATERAL CELLULITIS OF LOWER LEG: Primary | ICD-10-CM

## 2017-07-10 DIAGNOSIS — N18.30 TYPE 2 DIABETES MELLITUS WITH STAGE 3 CHRONIC KIDNEY DISEASE, WITH LONG-TERM CURRENT USE OF INSULIN (HCC): ICD-10-CM

## 2017-07-10 DIAGNOSIS — L03.115 BILATERAL CELLULITIS OF LOWER LEG: Primary | ICD-10-CM

## 2017-07-10 DIAGNOSIS — Z79.4 TYPE 2 DIABETES MELLITUS WITH STAGE 3 CHRONIC KIDNEY DISEASE, WITH LONG-TERM CURRENT USE OF INSULIN (HCC): ICD-10-CM

## 2017-07-10 DIAGNOSIS — E11.22 TYPE 2 DIABETES MELLITUS WITH STAGE 3 CHRONIC KIDNEY DISEASE, WITH LONG-TERM CURRENT USE OF INSULIN (HCC): ICD-10-CM

## 2017-07-10 DIAGNOSIS — F17.200 SMOKING ADDICTION: ICD-10-CM

## 2017-07-10 PROBLEM — N50.89 SCROTAL FULLNESS: Status: ACTIVE | Noted: 2017-07-10

## 2017-07-10 LAB
ABSOLUTE EOS #: 0.3 K/UL (ref 0–0.4)
ABSOLUTE LYMPH #: 1 K/UL (ref 1–4.8)
ABSOLUTE MONO #: 0.7 K/UL (ref 0.1–1.3)
ANION GAP SERPL CALCULATED.3IONS-SCNC: 15 MMOL/L (ref 9–17)
BASOPHILS # BLD: 1 %
BASOPHILS ABSOLUTE: 0.1 K/UL (ref 0–0.2)
BUN BLDV-MCNC: 28 MG/DL (ref 6–20)
BUN/CREAT BLD: ABNORMAL (ref 9–20)
C-REACTIVE PROTEIN: 22.4 MG/L (ref 0–5)
CALCIUM SERPL-MCNC: 9.7 MG/DL (ref 8.6–10.4)
CHLORIDE BLD-SCNC: 94 MMOL/L (ref 98–107)
CHLORIDE, UR: 30 MMOL/L
CO2: 30 MMOL/L (ref 20–31)
CREAT SERPL-MCNC: 1.6 MG/DL (ref 0.7–1.2)
CREATININE URINE: 104.9 MG/DL (ref 39–259)
DIFFERENTIAL TYPE: ABNORMAL
EOSINOPHIL,URINE: NORMAL
EOSINOPHILS RELATIVE PERCENT: 3 %
GFR AFRICAN AMERICAN: 55 ML/MIN
GFR NON-AFRICAN AMERICAN: 46 ML/MIN
GFR SERPL CREATININE-BSD FRML MDRD: ABNORMAL ML/MIN/{1.73_M2}
GFR SERPL CREATININE-BSD FRML MDRD: ABNORMAL ML/MIN/{1.73_M2}
GLUCOSE BLD-MCNC: 188 MG/DL (ref 70–99)
GLUCOSE BLD-MCNC: 294 MG/DL (ref 75–110)
GLUCOSE BLD-MCNC: 300 MG/DL (ref 75–110)
HCT VFR BLD CALC: 40.3 % (ref 41–53)
HEMOGLOBIN: 13.4 G/DL (ref 13.5–17.5)
LYMPHOCYTES # BLD: 11 %
MCH RBC QN AUTO: 27.2 PG (ref 26–34)
MCHC RBC AUTO-ENTMCNC: 33.4 G/DL (ref 31–37)
MCV RBC AUTO: 81.6 FL (ref 80–100)
MONOCYTES # BLD: 8 %
PDW BLD-RTO: 16.3 % (ref 11.5–14.9)
PLATELET # BLD: 185 K/UL (ref 150–450)
PLATELET ESTIMATE: ABNORMAL
PMV BLD AUTO: 9.7 FL (ref 6–12)
POTASSIUM SERPL-SCNC: 4.2 MMOL/L (ref 3.7–5.3)
POTASSIUM, UR: 28.4 MMOL/L
RBC # BLD: 4.94 M/UL (ref 4.5–5.9)
RBC # BLD: ABNORMAL 10*6/UL
SEDIMENTATION RATE, ERYTHROCYTE: 25 MM (ref 0–15)
SEG NEUTROPHILS: 77 %
SEGMENTED NEUTROPHILS ABSOLUTE COUNT: 7.6 K/UL (ref 1.3–9.1)
SODIUM BLD-SCNC: 139 MMOL/L (ref 135–144)
SODIUM,UR: 63 MMOL/L
WBC # BLD: 9.7 K/UL (ref 3.5–11)
WBC # BLD: ABNORMAL 10*3/UL

## 2017-07-10 PROCEDURE — 80048 BASIC METABOLIC PNL TOTAL CA: CPT

## 2017-07-10 PROCEDURE — 82570 ASSAY OF URINE CREATININE: CPT

## 2017-07-10 PROCEDURE — 84133 ASSAY OF URINE POTASSIUM: CPT

## 2017-07-10 PROCEDURE — 2580000003 HC RX 258: Performed by: EMERGENCY MEDICINE

## 2017-07-10 PROCEDURE — 85025 COMPLETE CBC W/AUTO DIFF WBC: CPT

## 2017-07-10 PROCEDURE — 87205 SMEAR GRAM STAIN: CPT

## 2017-07-10 PROCEDURE — 1200000000 HC SEMI PRIVATE

## 2017-07-10 PROCEDURE — 82436 ASSAY OF URINE CHLORIDE: CPT

## 2017-07-10 PROCEDURE — 99283 EMERGENCY DEPT VISIT LOW MDM: CPT

## 2017-07-10 PROCEDURE — 6360000002 HC RX W HCPCS: Performed by: STUDENT IN AN ORGANIZED HEALTH CARE EDUCATION/TRAINING PROGRAM

## 2017-07-10 PROCEDURE — 96374 THER/PROPH/DIAG INJ IV PUSH: CPT

## 2017-07-10 PROCEDURE — 85651 RBC SED RATE NONAUTOMATED: CPT

## 2017-07-10 PROCEDURE — 6370000000 HC RX 637 (ALT 250 FOR IP): Performed by: INTERNAL MEDICINE

## 2017-07-10 PROCEDURE — 94664 DEMO&/EVAL PT USE INHALER: CPT

## 2017-07-10 PROCEDURE — 6360000002 HC RX W HCPCS: Performed by: EMERGENCY MEDICINE

## 2017-07-10 PROCEDURE — 84300 ASSAY OF URINE SODIUM: CPT

## 2017-07-10 PROCEDURE — 99223 1ST HOSP IP/OBS HIGH 75: CPT | Performed by: INTERNAL MEDICINE

## 2017-07-10 PROCEDURE — 36415 COLL VENOUS BLD VENIPUNCTURE: CPT

## 2017-07-10 PROCEDURE — 6360000002 HC RX W HCPCS: Performed by: INTERNAL MEDICINE

## 2017-07-10 PROCEDURE — 86140 C-REACTIVE PROTEIN: CPT

## 2017-07-10 PROCEDURE — 2580000003 HC RX 258: Performed by: STUDENT IN AN ORGANIZED HEALTH CARE EDUCATION/TRAINING PROGRAM

## 2017-07-10 PROCEDURE — 2580000003 HC RX 258: Performed by: INTERNAL MEDICINE

## 2017-07-10 PROCEDURE — 6360000002 HC RX W HCPCS: Performed by: FAMILY MEDICINE

## 2017-07-10 PROCEDURE — 96375 TX/PRO/DX INJ NEW DRUG ADDON: CPT

## 2017-07-10 PROCEDURE — 82947 ASSAY GLUCOSE BLOOD QUANT: CPT

## 2017-07-10 RX ORDER — SODIUM CHLORIDE 0.9 % (FLUSH) 0.9 %
10 SYRINGE (ML) INJECTION EVERY 12 HOURS SCHEDULED
Status: DISCONTINUED | OUTPATIENT
Start: 2017-07-10 | End: 2017-07-12 | Stop reason: HOSPADM

## 2017-07-10 RX ORDER — LISINOPRIL 5 MG/1
5 TABLET ORAL DAILY
Status: DISCONTINUED | OUTPATIENT
Start: 2017-07-11 | End: 2017-07-12 | Stop reason: HOSPADM

## 2017-07-10 RX ORDER — VENLAFAXINE 75 MG/1
75 TABLET ORAL 2 TIMES DAILY
Status: DISCONTINUED | OUTPATIENT
Start: 2017-07-10 | End: 2017-07-12 | Stop reason: HOSPADM

## 2017-07-10 RX ORDER — DEXTROSE MONOHYDRATE 25 G/50ML
12.5 INJECTION, SOLUTION INTRAVENOUS PRN
Status: DISCONTINUED | OUTPATIENT
Start: 2017-07-10 | End: 2017-07-12 | Stop reason: HOSPADM

## 2017-07-10 RX ORDER — FLUTICASONE PROPIONATE 50 MCG
2 SPRAY, SUSPENSION (ML) NASAL DAILY
Status: DISCONTINUED | OUTPATIENT
Start: 2017-07-11 | End: 2017-07-12 | Stop reason: HOSPADM

## 2017-07-10 RX ORDER — ASPIRIN 81 MG/1
81 TABLET ORAL DAILY
Status: DISCONTINUED | OUTPATIENT
Start: 2017-07-11 | End: 2017-07-12 | Stop reason: HOSPADM

## 2017-07-10 RX ORDER — NICOTINE POLACRILEX 4 MG
15 LOZENGE BUCCAL PRN
Status: DISCONTINUED | OUTPATIENT
Start: 2017-07-10 | End: 2017-07-12 | Stop reason: HOSPADM

## 2017-07-10 RX ORDER — METOPROLOL TARTRATE 50 MG/1
50 TABLET, FILM COATED ORAL 2 TIMES DAILY
Status: DISCONTINUED | OUTPATIENT
Start: 2017-07-10 | End: 2017-07-12 | Stop reason: HOSPADM

## 2017-07-10 RX ORDER — FENTANYL CITRATE 50 UG/ML
100 INJECTION, SOLUTION INTRAMUSCULAR; INTRAVENOUS ONCE
Status: COMPLETED | OUTPATIENT
Start: 2017-07-10 | End: 2017-07-10

## 2017-07-10 RX ORDER — BUMETANIDE 1 MG/1
3 TABLET ORAL 2 TIMES DAILY
Status: DISCONTINUED | OUTPATIENT
Start: 2017-07-10 | End: 2017-07-10

## 2017-07-10 RX ORDER — PRAVASTATIN SODIUM 40 MG
40 TABLET ORAL EVERY EVENING
Status: DISCONTINUED | OUTPATIENT
Start: 2017-07-10 | End: 2017-07-12 | Stop reason: HOSPADM

## 2017-07-10 RX ORDER — BUMETANIDE 0.25 MG/ML
2 INJECTION, SOLUTION INTRAMUSCULAR; INTRAVENOUS 2 TIMES DAILY
Status: DISCONTINUED | OUTPATIENT
Start: 2017-07-11 | End: 2017-07-12 | Stop reason: HOSPADM

## 2017-07-10 RX ORDER — MECOBALAMIN 5000 MCG
10 TABLET,DISINTEGRATING ORAL NIGHTLY PRN
Status: DISCONTINUED | OUTPATIENT
Start: 2017-07-10 | End: 2017-07-12 | Stop reason: HOSPADM

## 2017-07-10 RX ORDER — HEPARIN SODIUM 5000 [USP'U]/ML
5000 INJECTION, SOLUTION INTRAVENOUS; SUBCUTANEOUS EVERY 8 HOURS SCHEDULED
Status: DISCONTINUED | OUTPATIENT
Start: 2017-07-10 | End: 2017-07-12 | Stop reason: HOSPADM

## 2017-07-10 RX ORDER — DEXTROSE MONOHYDRATE 50 MG/ML
100 INJECTION, SOLUTION INTRAVENOUS PRN
Status: DISCONTINUED | OUTPATIENT
Start: 2017-07-10 | End: 2017-07-12 | Stop reason: HOSPADM

## 2017-07-10 RX ORDER — SPIRONOLACTONE 25 MG/1
50 TABLET ORAL DAILY
Status: DISCONTINUED | OUTPATIENT
Start: 2017-07-11 | End: 2017-07-12 | Stop reason: HOSPADM

## 2017-07-10 RX ORDER — SODIUM CHLORIDE 0.9 % (FLUSH) 0.9 %
10 SYRINGE (ML) INJECTION PRN
Status: DISCONTINUED | OUTPATIENT
Start: 2017-07-10 | End: 2017-07-12 | Stop reason: HOSPADM

## 2017-07-10 RX ORDER — ACETAMINOPHEN 325 MG/1
650 TABLET ORAL EVERY 4 HOURS PRN
Status: DISCONTINUED | OUTPATIENT
Start: 2017-07-10 | End: 2017-07-12 | Stop reason: HOSPADM

## 2017-07-10 RX ORDER — METOLAZONE 2.5 MG/1
2.5 TABLET ORAL
Status: DISCONTINUED | OUTPATIENT
Start: 2017-07-10 | End: 2017-07-11

## 2017-07-10 RX ORDER — ALBUTEROL SULFATE 2.5 MG/3ML
2.5 SOLUTION RESPIRATORY (INHALATION) EVERY 6 HOURS PRN
Status: DISCONTINUED | OUTPATIENT
Start: 2017-07-10 | End: 2017-07-12 | Stop reason: HOSPADM

## 2017-07-10 RX ORDER — DOCUSATE SODIUM 100 MG/1
100 CAPSULE, LIQUID FILLED ORAL 2 TIMES DAILY PRN
Status: DISCONTINUED | OUTPATIENT
Start: 2017-07-10 | End: 2017-07-12 | Stop reason: HOSPADM

## 2017-07-10 RX ORDER — NITROGLYCERIN 0.4 MG/1
0.4 TABLET SUBLINGUAL EVERY 5 MIN PRN
Status: DISCONTINUED | OUTPATIENT
Start: 2017-07-10 | End: 2017-07-12 | Stop reason: HOSPADM

## 2017-07-10 RX ORDER — PANTOPRAZOLE SODIUM 40 MG/1
40 TABLET, DELAYED RELEASE ORAL NIGHTLY
Status: DISCONTINUED | OUTPATIENT
Start: 2017-07-10 | End: 2017-07-12 | Stop reason: HOSPADM

## 2017-07-10 RX ORDER — MORPHINE SULFATE 2 MG/ML
2 INJECTION, SOLUTION INTRAMUSCULAR; INTRAVENOUS EVERY 4 HOURS PRN
Status: DISCONTINUED | OUTPATIENT
Start: 2017-07-10 | End: 2017-07-12 | Stop reason: HOSPADM

## 2017-07-10 RX ADMIN — VENLAFAXINE 75 MG: 75 TABLET ORAL at 22:09

## 2017-07-10 RX ADMIN — METOPROLOL TARTRATE 50 MG: 50 TABLET ORAL at 22:08

## 2017-07-10 RX ADMIN — HEPARIN SODIUM 5000 UNITS: 5000 INJECTION, SOLUTION INTRAVENOUS; SUBCUTANEOUS at 22:08

## 2017-07-10 RX ADMIN — VANCOMYCIN HYDROCHLORIDE 2000 MG: 1 INJECTION, POWDER, LYOPHILIZED, FOR SOLUTION INTRAVENOUS at 11:20

## 2017-07-10 RX ADMIN — MOMETASONE FUROATE AND FORMOTEROL FUMARATE DIHYDRATE 2 PUFF: 200; 5 AEROSOL RESPIRATORY (INHALATION) at 22:17

## 2017-07-10 RX ADMIN — FENTANYL CITRATE 100 MCG: 50 INJECTION INTRAMUSCULAR; INTRAVENOUS at 10:27

## 2017-07-10 RX ADMIN — VANCOMYCIN HYDROCHLORIDE 1500 MG: 1 INJECTION, POWDER, LYOPHILIZED, FOR SOLUTION INTRAVENOUS at 22:42

## 2017-07-10 RX ADMIN — METOLAZONE 2.5 MG: 2.5 TABLET ORAL at 22:09

## 2017-07-10 RX ADMIN — MORPHINE SULFATE 2 MG: 2 INJECTION, SOLUTION INTRAMUSCULAR; INTRAVENOUS at 22:08

## 2017-07-10 RX ADMIN — HEPARIN SODIUM 5000 UNITS: 5000 INJECTION, SOLUTION INTRAVENOUS; SUBCUTANEOUS at 16:04

## 2017-07-10 RX ADMIN — Medication 10 MG: at 22:09

## 2017-07-10 RX ADMIN — PRAVASTATIN SODIUM 40 MG: 40 TABLET ORAL at 22:09

## 2017-07-10 RX ADMIN — PANTOPRAZOLE SODIUM 40 MG: 40 TABLET, DELAYED RELEASE ORAL at 22:08

## 2017-07-10 RX ADMIN — Medication 10 ML: at 22:08

## 2017-07-10 ASSESSMENT — ENCOUNTER SYMPTOMS
SHORTNESS OF BREATH: 0
BACK PAIN: 0
COUGH: 0
DIARRHEA: 0
COLOR CHANGE: 1
EYE PAIN: 0
ABDOMINAL DISTENTION: 0
VOMITING: 0
ABDOMINAL PAIN: 0
NAUSEA: 0
SORE THROAT: 0

## 2017-07-10 ASSESSMENT — PAIN SCALES - GENERAL
PAINLEVEL_OUTOF10: 10
PAINLEVEL_OUTOF10: 5
PAINLEVEL_OUTOF10: 10
PAINLEVEL_OUTOF10: 10
PAINLEVEL_OUTOF10: 5

## 2017-07-10 ASSESSMENT — PAIN DESCRIPTION - PAIN TYPE
TYPE: ACUTE PAIN
TYPE: ACUTE PAIN

## 2017-07-10 ASSESSMENT — PAIN DESCRIPTION - LOCATION
LOCATION: LEG
LOCATION: LEG

## 2017-07-10 ASSESSMENT — PAIN DESCRIPTION - ORIENTATION
ORIENTATION: RIGHT;LEFT
ORIENTATION: LEFT;RIGHT

## 2017-07-10 ASSESSMENT — PAIN DESCRIPTION - DESCRIPTORS: DESCRIPTORS: ACHING

## 2017-07-11 LAB
ABSOLUTE EOS #: 0.2 K/UL (ref 0–0.4)
ABSOLUTE LYMPH #: 1.4 K/UL (ref 1–4.8)
ABSOLUTE MONO #: 0.6 K/UL (ref 0.1–1.3)
ANION GAP SERPL CALCULATED.3IONS-SCNC: 15 MMOL/L (ref 9–17)
BASOPHILS # BLD: 1 %
BASOPHILS ABSOLUTE: 0 K/UL (ref 0–0.2)
BUN BLDV-MCNC: 29 MG/DL (ref 6–20)
BUN/CREAT BLD: ABNORMAL (ref 9–20)
CALCIUM SERPL-MCNC: 9.2 MG/DL (ref 8.6–10.4)
CHLORIDE BLD-SCNC: 95 MMOL/L (ref 98–107)
CO2: 28 MMOL/L (ref 20–31)
CREAT SERPL-MCNC: 1.42 MG/DL (ref 0.7–1.2)
DIFFERENTIAL TYPE: ABNORMAL
EOSINOPHILS RELATIVE PERCENT: 3 %
GFR AFRICAN AMERICAN: >60 ML/MIN
GFR NON-AFRICAN AMERICAN: 52 ML/MIN
GFR SERPL CREATININE-BSD FRML MDRD: ABNORMAL ML/MIN/{1.73_M2}
GFR SERPL CREATININE-BSD FRML MDRD: ABNORMAL ML/MIN/{1.73_M2}
GLUCOSE BLD-MCNC: 286 MG/DL (ref 75–110)
GLUCOSE BLD-MCNC: 315 MG/DL (ref 70–99)
GLUCOSE BLD-MCNC: 324 MG/DL (ref 75–110)
GLUCOSE BLD-MCNC: 325 MG/DL (ref 75–110)
GLUCOSE BLD-MCNC: 339 MG/DL (ref 75–110)
HCT VFR BLD CALC: 38 % (ref 41–53)
HEMOGLOBIN: 12.5 G/DL (ref 13.5–17.5)
LYMPHOCYTES # BLD: 19 %
MAGNESIUM: 2.1 MG/DL (ref 1.6–2.6)
MCH RBC QN AUTO: 26.8 PG (ref 26–34)
MCHC RBC AUTO-ENTMCNC: 32.8 G/DL (ref 31–37)
MCV RBC AUTO: 81.7 FL (ref 80–100)
MONOCYTES # BLD: 9 %
PDW BLD-RTO: 16.2 % (ref 11.5–14.9)
PLATELET # BLD: 168 K/UL (ref 150–450)
PLATELET ESTIMATE: ABNORMAL
PMV BLD AUTO: 9.5 FL (ref 6–12)
POTASSIUM SERPL-SCNC: 4.5 MMOL/L (ref 3.7–5.3)
RBC # BLD: 4.65 M/UL (ref 4.5–5.9)
RBC # BLD: ABNORMAL 10*6/UL
SEG NEUTROPHILS: 68 %
SEGMENTED NEUTROPHILS ABSOLUTE COUNT: 5.3 K/UL (ref 1.3–9.1)
SODIUM BLD-SCNC: 138 MMOL/L (ref 135–144)
WBC # BLD: 7.6 K/UL (ref 3.5–11)
WBC # BLD: ABNORMAL 10*3/UL

## 2017-07-11 PROCEDURE — 2500000003 HC RX 250 WO HCPCS: Performed by: INTERNAL MEDICINE

## 2017-07-11 PROCEDURE — 6370000000 HC RX 637 (ALT 250 FOR IP): Performed by: INTERNAL MEDICINE

## 2017-07-11 PROCEDURE — 83735 ASSAY OF MAGNESIUM: CPT

## 2017-07-11 PROCEDURE — 80048 BASIC METABOLIC PNL TOTAL CA: CPT

## 2017-07-11 PROCEDURE — 6370000000 HC RX 637 (ALT 250 FOR IP): Performed by: NURSE PRACTITIONER

## 2017-07-11 PROCEDURE — 94760 N-INVAS EAR/PLS OXIMETRY 1: CPT

## 2017-07-11 PROCEDURE — 6360000002 HC RX W HCPCS: Performed by: FAMILY MEDICINE

## 2017-07-11 PROCEDURE — 85025 COMPLETE CBC W/AUTO DIFF WBC: CPT

## 2017-07-11 PROCEDURE — 6360000002 HC RX W HCPCS: Performed by: INTERNAL MEDICINE

## 2017-07-11 PROCEDURE — 99232 SBSQ HOSP IP/OBS MODERATE 35: CPT | Performed by: INTERNAL MEDICINE

## 2017-07-11 PROCEDURE — 82947 ASSAY GLUCOSE BLOOD QUANT: CPT

## 2017-07-11 PROCEDURE — 1200000000 HC SEMI PRIVATE

## 2017-07-11 PROCEDURE — 36415 COLL VENOUS BLD VENIPUNCTURE: CPT

## 2017-07-11 PROCEDURE — 6360000002 HC RX W HCPCS: Performed by: STUDENT IN AN ORGANIZED HEALTH CARE EDUCATION/TRAINING PROGRAM

## 2017-07-11 PROCEDURE — 2580000003 HC RX 258: Performed by: INTERNAL MEDICINE

## 2017-07-11 PROCEDURE — 2580000003 HC RX 258: Performed by: STUDENT IN AN ORGANIZED HEALTH CARE EDUCATION/TRAINING PROGRAM

## 2017-07-11 RX ORDER — METOLAZONE 2.5 MG/1
2.5 TABLET ORAL DAILY
Status: DISCONTINUED | OUTPATIENT
Start: 2017-07-12 | End: 2017-07-12 | Stop reason: HOSPADM

## 2017-07-11 RX ADMIN — SPIRONOLACTONE 50 MG: 25 TABLET, FILM COATED ORAL at 07:48

## 2017-07-11 RX ADMIN — METOPROLOL TARTRATE 50 MG: 50 TABLET ORAL at 07:48

## 2017-07-11 RX ADMIN — BUMETANIDE 2 MG: 0.25 INJECTION INTRAMUSCULAR; INTRAVENOUS at 07:49

## 2017-07-11 RX ADMIN — HEPARIN SODIUM 5000 UNITS: 5000 INJECTION, SOLUTION INTRAVENOUS; SUBCUTANEOUS at 13:22

## 2017-07-11 RX ADMIN — MORPHINE SULFATE 2 MG: 2 INJECTION, SOLUTION INTRAMUSCULAR; INTRAVENOUS at 03:42

## 2017-07-11 RX ADMIN — HEPARIN SODIUM 5000 UNITS: 5000 INJECTION, SOLUTION INTRAVENOUS; SUBCUTANEOUS at 20:59

## 2017-07-11 RX ADMIN — METOPROLOL TARTRATE 50 MG: 50 TABLET ORAL at 20:57

## 2017-07-11 RX ADMIN — INSULIN LISPRO 3 UNITS: 100 INJECTION, SOLUTION INTRAVENOUS; SUBCUTANEOUS at 20:59

## 2017-07-11 RX ADMIN — TIOTROPIUM BROMIDE 18 MCG: 18 CAPSULE ORAL; RESPIRATORY (INHALATION) at 10:32

## 2017-07-11 RX ADMIN — VENLAFAXINE 75 MG: 75 TABLET ORAL at 07:49

## 2017-07-11 RX ADMIN — PRAVASTATIN SODIUM 40 MG: 40 TABLET ORAL at 20:56

## 2017-07-11 RX ADMIN — FLUTICASONE PROPIONATE 2 SPRAY: 50 SPRAY, METERED NASAL at 07:50

## 2017-07-11 RX ADMIN — PANTOPRAZOLE SODIUM 40 MG: 40 TABLET, DELAYED RELEASE ORAL at 20:55

## 2017-07-11 RX ADMIN — VANCOMYCIN HYDROCHLORIDE 1500 MG: 1 INJECTION, POWDER, LYOPHILIZED, FOR SOLUTION INTRAVENOUS at 10:32

## 2017-07-11 RX ADMIN — MORPHINE SULFATE 2 MG: 2 INJECTION, SOLUTION INTRAMUSCULAR; INTRAVENOUS at 07:50

## 2017-07-11 RX ADMIN — VANCOMYCIN HYDROCHLORIDE 1500 MG: 1 INJECTION, POWDER, LYOPHILIZED, FOR SOLUTION INTRAVENOUS at 20:57

## 2017-07-11 RX ADMIN — MOMETASONE FUROATE AND FORMOTEROL FUMARATE DIHYDRATE 2 PUFF: 200; 5 AEROSOL RESPIRATORY (INHALATION) at 20:55

## 2017-07-11 RX ADMIN — INSULIN LISPRO 5 UNITS: 100 INJECTION, SOLUTION INTRAVENOUS; SUBCUTANEOUS at 00:12

## 2017-07-11 RX ADMIN — INSULIN HUMAN 100 UNITS: 500 INJECTION, SOLUTION SUBCUTANEOUS at 11:33

## 2017-07-11 RX ADMIN — BUMETANIDE 2 MG: 0.25 INJECTION INTRAMUSCULAR; INTRAVENOUS at 20:56

## 2017-07-11 RX ADMIN — ASPIRIN 81 MG: 81 TABLET, COATED ORAL at 07:48

## 2017-07-11 RX ADMIN — Medication 10 ML: at 20:55

## 2017-07-11 RX ADMIN — INSULIN HUMAN 100 UNITS: 500 INJECTION, SOLUTION SUBCUTANEOUS at 17:11

## 2017-07-11 RX ADMIN — HEPARIN SODIUM 5000 UNITS: 5000 INJECTION, SOLUTION INTRAVENOUS; SUBCUTANEOUS at 05:34

## 2017-07-11 RX ADMIN — VITAMIN D, TAB 1000IU (100/BT) 1000 UNITS: 25 TAB at 10:39

## 2017-07-11 RX ADMIN — MOMETASONE FUROATE AND FORMOTEROL FUMARATE DIHYDRATE 2 PUFF: 200; 5 AEROSOL RESPIRATORY (INHALATION) at 07:48

## 2017-07-11 RX ADMIN — Medication 10 ML: at 08:02

## 2017-07-11 RX ADMIN — INSULIN LISPRO 9 UNITS: 100 INJECTION, SOLUTION INTRAVENOUS; SUBCUTANEOUS at 07:51

## 2017-07-11 RX ADMIN — VENLAFAXINE 75 MG: 75 TABLET ORAL at 20:56

## 2017-07-11 RX ADMIN — MORPHINE SULFATE 2 MG: 2 INJECTION, SOLUTION INTRAMUSCULAR; INTRAVENOUS at 16:15

## 2017-07-11 RX ADMIN — INSULIN LISPRO 4 UNITS: 100 INJECTION, SOLUTION INTRAVENOUS; SUBCUTANEOUS at 17:11

## 2017-07-11 RX ADMIN — LISINOPRIL 5 MG: 5 TABLET ORAL at 07:48

## 2017-07-11 ASSESSMENT — PAIN DESCRIPTION - PROGRESSION

## 2017-07-11 ASSESSMENT — PAIN SCALES - GENERAL
PAINLEVEL_OUTOF10: 0
PAINLEVEL_OUTOF10: 2
PAINLEVEL_OUTOF10: 9
PAINLEVEL_OUTOF10: 8
PAINLEVEL_OUTOF10: 9

## 2017-07-11 ASSESSMENT — PAIN DESCRIPTION - ORIENTATION
ORIENTATION: RIGHT;LEFT
ORIENTATION: RIGHT;LEFT

## 2017-07-11 ASSESSMENT — PAIN DESCRIPTION - LOCATION
LOCATION: LEG
LOCATION: LEG

## 2017-07-11 ASSESSMENT — PAIN DESCRIPTION - PAIN TYPE
TYPE: ACUTE PAIN
TYPE: ACUTE PAIN

## 2017-07-11 ASSESSMENT — PAIN DESCRIPTION - DESCRIPTORS: DESCRIPTORS: BURNING

## 2017-07-11 ASSESSMENT — PAIN DESCRIPTION - FREQUENCY: FREQUENCY: CONTINUOUS

## 2017-07-11 ASSESSMENT — PAIN DESCRIPTION - ONSET: ONSET: ON-GOING

## 2017-07-12 VITALS
OXYGEN SATURATION: 93 % | WEIGHT: 315 LBS | TEMPERATURE: 97.9 F | HEART RATE: 70 BPM | DIASTOLIC BLOOD PRESSURE: 46 MMHG | HEIGHT: 72 IN | RESPIRATION RATE: 16 BRPM | SYSTOLIC BLOOD PRESSURE: 107 MMHG | BODY MASS INDEX: 42.66 KG/M2

## 2017-07-12 LAB
ABSOLUTE EOS #: 0.2 K/UL (ref 0–0.4)
ABSOLUTE LYMPH #: 1.8 K/UL (ref 1–4.8)
ABSOLUTE MONO #: 0.9 K/UL (ref 0.1–1.3)
ANION GAP SERPL CALCULATED.3IONS-SCNC: 14 MMOL/L (ref 9–17)
BASOPHILS # BLD: 1 %
BASOPHILS ABSOLUTE: 0.1 K/UL (ref 0–0.2)
BUN BLDV-MCNC: 33 MG/DL (ref 6–20)
BUN/CREAT BLD: ABNORMAL (ref 9–20)
CALCIUM SERPL-MCNC: 10.2 MG/DL (ref 8.6–10.4)
CHLORIDE BLD-SCNC: 90 MMOL/L (ref 98–107)
CO2: 33 MMOL/L (ref 20–31)
CREAT SERPL-MCNC: 1.48 MG/DL (ref 0.7–1.2)
DIFFERENTIAL TYPE: ABNORMAL
EOSINOPHILS RELATIVE PERCENT: 3 %
GFR AFRICAN AMERICAN: >60 ML/MIN
GFR NON-AFRICAN AMERICAN: 50 ML/MIN
GFR SERPL CREATININE-BSD FRML MDRD: ABNORMAL ML/MIN/{1.73_M2}
GFR SERPL CREATININE-BSD FRML MDRD: ABNORMAL ML/MIN/{1.73_M2}
GLUCOSE BLD-MCNC: 131 MG/DL (ref 75–110)
GLUCOSE BLD-MCNC: 140 MG/DL (ref 70–99)
GLUCOSE BLD-MCNC: 148 MG/DL (ref 75–110)
HCT VFR BLD CALC: 41.1 % (ref 41–53)
HEMOGLOBIN: 13.5 G/DL (ref 13.5–17.5)
LYMPHOCYTES # BLD: 20 %
MCH RBC QN AUTO: 26.8 PG (ref 26–34)
MCHC RBC AUTO-ENTMCNC: 32.9 G/DL (ref 31–37)
MCV RBC AUTO: 81.5 FL (ref 80–100)
MONOCYTES # BLD: 10 %
PDW BLD-RTO: 16.3 % (ref 11.5–14.9)
PLATELET # BLD: 193 K/UL (ref 150–450)
PLATELET ESTIMATE: ABNORMAL
PMV BLD AUTO: 9.1 FL (ref 6–12)
POTASSIUM SERPL-SCNC: 4 MMOL/L (ref 3.7–5.3)
RBC # BLD: 5.04 M/UL (ref 4.5–5.9)
RBC # BLD: ABNORMAL 10*6/UL
SEG NEUTROPHILS: 66 %
SEGMENTED NEUTROPHILS ABSOLUTE COUNT: 6.1 K/UL (ref 1.3–9.1)
SODIUM BLD-SCNC: 137 MMOL/L (ref 135–144)
VANCOMYCIN TROUGH DATE LAST DOSE: ABNORMAL
VANCOMYCIN TROUGH DOSE AMOUNT: ABNORMAL
VANCOMYCIN TROUGH TIME LAST DOSE: 2059
VANCOMYCIN TROUGH: 21.2 UG/ML (ref 10–20)
WBC # BLD: 9.1 K/UL (ref 3.5–11)
WBC # BLD: ABNORMAL 10*3/UL

## 2017-07-12 PROCEDURE — 36415 COLL VENOUS BLD VENIPUNCTURE: CPT

## 2017-07-12 PROCEDURE — 6360000002 HC RX W HCPCS: Performed by: INTERNAL MEDICINE

## 2017-07-12 PROCEDURE — 80202 ASSAY OF VANCOMYCIN: CPT

## 2017-07-12 PROCEDURE — 80048 BASIC METABOLIC PNL TOTAL CA: CPT

## 2017-07-12 PROCEDURE — 2580000003 HC RX 258: Performed by: INTERNAL MEDICINE

## 2017-07-12 PROCEDURE — 99239 HOSP IP/OBS DSCHRG MGMT >30: CPT | Performed by: INTERNAL MEDICINE

## 2017-07-12 PROCEDURE — 6360000002 HC RX W HCPCS: Performed by: FAMILY MEDICINE

## 2017-07-12 PROCEDURE — 2500000003 HC RX 250 WO HCPCS: Performed by: INTERNAL MEDICINE

## 2017-07-12 PROCEDURE — 6370000000 HC RX 637 (ALT 250 FOR IP): Performed by: INTERNAL MEDICINE

## 2017-07-12 PROCEDURE — 82947 ASSAY GLUCOSE BLOOD QUANT: CPT

## 2017-07-12 PROCEDURE — 85025 COMPLETE CBC W/AUTO DIFF WBC: CPT

## 2017-07-12 PROCEDURE — 6370000000 HC RX 637 (ALT 250 FOR IP): Performed by: NURSE PRACTITIONER

## 2017-07-12 RX ORDER — METOPROLOL TARTRATE 50 MG/1
50 TABLET, FILM COATED ORAL 2 TIMES DAILY
Qty: 60 TABLET | Refills: 3 | Status: SHIPPED | OUTPATIENT
Start: 2017-07-12 | End: 2017-11-21 | Stop reason: SDUPTHER

## 2017-07-12 RX ORDER — DOXYCYCLINE HYCLATE 100 MG
100 TABLET ORAL 2 TIMES DAILY
Qty: 20 TABLET | Refills: 0 | Status: SHIPPED | OUTPATIENT
Start: 2017-07-12 | End: 2017-07-20

## 2017-07-12 RX ADMIN — VANCOMYCIN HYDROCHLORIDE 1250 MG: 10 INJECTION, POWDER, LYOPHILIZED, FOR SOLUTION INTRAVENOUS at 13:00

## 2017-07-12 RX ADMIN — Medication 10 ML: at 08:53

## 2017-07-12 RX ADMIN — ASPIRIN 81 MG: 81 TABLET, COATED ORAL at 08:31

## 2017-07-12 RX ADMIN — METOPROLOL TARTRATE 50 MG: 50 TABLET ORAL at 08:31

## 2017-07-12 RX ADMIN — HEPARIN SODIUM 5000 UNITS: 5000 INJECTION, SOLUTION INTRAVENOUS; SUBCUTANEOUS at 06:05

## 2017-07-12 RX ADMIN — LISINOPRIL 5 MG: 5 TABLET ORAL at 08:31

## 2017-07-12 RX ADMIN — METOLAZONE 2.5 MG: 2.5 TABLET ORAL at 08:32

## 2017-07-12 RX ADMIN — INSULIN LISPRO 2 UNITS: 100 INJECTION, SOLUTION INTRAVENOUS; SUBCUTANEOUS at 08:40

## 2017-07-12 RX ADMIN — SPIRONOLACTONE 50 MG: 25 TABLET, FILM COATED ORAL at 08:31

## 2017-07-12 RX ADMIN — VITAMIN D, TAB 1000IU (100/BT) 1000 UNITS: 25 TAB at 08:31

## 2017-07-12 RX ADMIN — MORPHINE SULFATE 2 MG: 2 INJECTION, SOLUTION INTRAMUSCULAR; INTRAVENOUS at 08:37

## 2017-07-12 RX ADMIN — TIOTROPIUM BROMIDE 18 MCG: 18 CAPSULE ORAL; RESPIRATORY (INHALATION) at 08:33

## 2017-07-12 RX ADMIN — BUMETANIDE 2 MG: 0.25 INJECTION INTRAMUSCULAR; INTRAVENOUS at 08:33

## 2017-07-12 RX ADMIN — MORPHINE SULFATE 2 MG: 2 INJECTION, SOLUTION INTRAMUSCULAR; INTRAVENOUS at 02:45

## 2017-07-12 RX ADMIN — MORPHINE SULFATE 2 MG: 2 INJECTION, SOLUTION INTRAMUSCULAR; INTRAVENOUS at 13:01

## 2017-07-12 RX ADMIN — INSULIN LISPRO 6 UNITS: 100 INJECTION, SOLUTION INTRAVENOUS; SUBCUTANEOUS at 12:45

## 2017-07-12 RX ADMIN — MOMETASONE FUROATE AND FORMOTEROL FUMARATE DIHYDRATE 2 PUFF: 200; 5 AEROSOL RESPIRATORY (INHALATION) at 08:31

## 2017-07-12 RX ADMIN — VENLAFAXINE 75 MG: 75 TABLET ORAL at 08:32

## 2017-07-12 ASSESSMENT — PAIN DESCRIPTION - PROGRESSION

## 2017-07-12 ASSESSMENT — PAIN SCALES - GENERAL
PAINLEVEL_OUTOF10: 4
PAINLEVEL_OUTOF10: 8
PAINLEVEL_OUTOF10: 2
PAINLEVEL_OUTOF10: 9
PAINLEVEL_OUTOF10: 7
PAINLEVEL_OUTOF10: 0

## 2017-07-13 LAB — GLUCOSE BLD-MCNC: 295 MG/DL (ref 75–110)

## 2017-07-15 ENCOUNTER — HOSPITAL ENCOUNTER (OUTPATIENT)
Age: 52
Discharge: HOME | End: 2017-07-15
Payer: COMMERCIAL

## 2017-07-15 LAB
ANION GAP SERPL CALCULATED.3IONS-SCNC: 19 MMOL/L (ref 9–17)
BUN BLDV-MCNC: 67 MG/DL (ref 6–20)
BUN/CREAT BLD: ABNORMAL (ref 9–20)
CALCIUM SERPL-MCNC: 9.8 MG/DL (ref 8.6–10.4)
CHLORIDE BLD-SCNC: 86 MMOL/L (ref 98–107)
CO2: 28 MMOL/L (ref 20–31)
CREAT SERPL-MCNC: 2.29 MG/DL (ref 0.7–1.2)
GFR AFRICAN AMERICAN: 37 ML/MIN
GFR NON-AFRICAN AMERICAN: 30 ML/MIN
GFR SERPL CREATININE-BSD FRML MDRD: ABNORMAL ML/MIN/{1.73_M2}
GFR SERPL CREATININE-BSD FRML MDRD: ABNORMAL ML/MIN/{1.73_M2}
GLUCOSE BLD-MCNC: 244 MG/DL (ref 70–99)
POTASSIUM SERPL-SCNC: 3.9 MMOL/L (ref 3.7–5.3)
SODIUM BLD-SCNC: 133 MMOL/L (ref 135–144)

## 2017-07-15 PROCEDURE — 36415 COLL VENOUS BLD VENIPUNCTURE: CPT

## 2017-07-15 PROCEDURE — 80048 BASIC METABOLIC PNL TOTAL CA: CPT

## 2017-07-20 ENCOUNTER — OFFICE VISIT (OUTPATIENT)
Dept: FAMILY MEDICINE CLINIC | Age: 52
End: 2017-07-20
Payer: COMMERCIAL

## 2017-07-20 VITALS
BODY MASS INDEX: 42.66 KG/M2 | OXYGEN SATURATION: 94 % | SYSTOLIC BLOOD PRESSURE: 136 MMHG | WEIGHT: 315 LBS | TEMPERATURE: 97.7 F | DIASTOLIC BLOOD PRESSURE: 82 MMHG | HEIGHT: 72 IN | HEART RATE: 97 BPM

## 2017-07-20 DIAGNOSIS — E11.40 TYPE 2 DIABETES MELLITUS WITH DIABETIC NEUROPATHY, WITH LONG-TERM CURRENT USE OF INSULIN (HCC): ICD-10-CM

## 2017-07-20 DIAGNOSIS — L03.116 CELLULITIS OF LEFT LOWER EXTREMITY: Primary | ICD-10-CM

## 2017-07-20 DIAGNOSIS — M54.42 CHRONIC MIDLINE LOW BACK PAIN WITH LEFT-SIDED SCIATICA: ICD-10-CM

## 2017-07-20 DIAGNOSIS — N17.9 ACUTE ON CHRONIC KIDNEY FAILURE (HCC): ICD-10-CM

## 2017-07-20 DIAGNOSIS — Z79.4 TYPE 2 DIABETES MELLITUS WITH DIABETIC NEUROPATHY, WITH LONG-TERM CURRENT USE OF INSULIN (HCC): ICD-10-CM

## 2017-07-20 DIAGNOSIS — I10 ESSENTIAL HYPERTENSION: ICD-10-CM

## 2017-07-20 DIAGNOSIS — I87.2 STASIS DERMATITIS OF BOTH LEGS: ICD-10-CM

## 2017-07-20 DIAGNOSIS — K59.01 SLOW TRANSIT CONSTIPATION: ICD-10-CM

## 2017-07-20 DIAGNOSIS — G47.01 INSOMNIA DUE TO MEDICAL CONDITION: ICD-10-CM

## 2017-07-20 DIAGNOSIS — N18.9 ACUTE ON CHRONIC KIDNEY FAILURE (HCC): ICD-10-CM

## 2017-07-20 DIAGNOSIS — L03.115 CELLULITIS OF RIGHT LOWER EXTREMITY: ICD-10-CM

## 2017-07-20 DIAGNOSIS — B35.1 ONYCHOMYCOSIS: ICD-10-CM

## 2017-07-20 DIAGNOSIS — G47.33 OSA ON CPAP: ICD-10-CM

## 2017-07-20 DIAGNOSIS — I50.32 CHRONIC DIASTOLIC CONGESTIVE HEART FAILURE (HCC): ICD-10-CM

## 2017-07-20 DIAGNOSIS — Z99.89 OSA ON CPAP: ICD-10-CM

## 2017-07-20 DIAGNOSIS — G89.29 CHRONIC MIDLINE LOW BACK PAIN WITH LEFT-SIDED SCIATICA: ICD-10-CM

## 2017-07-20 DIAGNOSIS — Z78.9 INTOLERANCE OF CONTINUOUS POSITIVE AIRWAY PRESSURE (CPAP) VENTILATION: ICD-10-CM

## 2017-07-20 PROBLEM — N50.89 SCROTAL FULLNESS: Status: RESOLVED | Noted: 2017-07-10 | Resolved: 2017-07-20

## 2017-07-20 LAB — HBA1C MFR BLD: 8.6 %

## 2017-07-20 PROCEDURE — 83036 HEMOGLOBIN GLYCOSYLATED A1C: CPT | Performed by: FAMILY MEDICINE

## 2017-07-20 PROCEDURE — 99215 OFFICE O/P EST HI 40 MIN: CPT | Performed by: FAMILY MEDICINE

## 2017-07-20 RX ORDER — OXYCODONE HYDROCHLORIDE 10 MG/1
10 TABLET ORAL EVERY 8 HOURS PRN
Qty: 90 TABLET | Refills: 0 | Status: SHIPPED | OUTPATIENT
Start: 2017-07-20 | End: 2017-08-18 | Stop reason: SDUPTHER

## 2017-07-20 RX ORDER — DOCUSATE SODIUM 100 MG/1
100 CAPSULE, LIQUID FILLED ORAL 2 TIMES DAILY PRN
Qty: 60 CAPSULE | Refills: 3 | Status: SHIPPED | OUTPATIENT
Start: 2017-07-20 | End: 2017-11-24 | Stop reason: SDUPTHER

## 2017-07-20 ASSESSMENT — ENCOUNTER SYMPTOMS
COUGH: 0
CONSTIPATION: 0
CHEST TIGHTNESS: 0
APNEA: 1
ABDOMINAL PAIN: 0
BACK PAIN: 1
WHEEZING: 0
SHORTNESS OF BREATH: 1
NAUSEA: 0
VOMITING: 0
DIARRHEA: 0
ABDOMINAL DISTENTION: 0

## 2017-07-21 DIAGNOSIS — L30.4 INTERTRIGO: ICD-10-CM

## 2017-07-23 ENCOUNTER — PATIENT MESSAGE (OUTPATIENT)
Dept: FAMILY MEDICINE CLINIC | Age: 52
End: 2017-07-23

## 2017-07-24 ENCOUNTER — HOSPITAL ENCOUNTER (OUTPATIENT)
Age: 52
Discharge: HOME | End: 2017-07-24
Payer: COMMERCIAL

## 2017-07-24 DIAGNOSIS — N18.30 CKD (CHRONIC KIDNEY DISEASE) STAGE 3, GFR 30-59 ML/MIN (HCC): ICD-10-CM

## 2017-07-24 LAB
ANION GAP SERPL CALCULATED.3IONS-SCNC: 15 MMOL/L (ref 9–17)
BUN BLDV-MCNC: 99 MG/DL (ref 6–20)
BUN/CREAT BLD: ABNORMAL (ref 9–20)
CALCIUM SERPL-MCNC: 10.2 MG/DL (ref 8.6–10.4)
CHLORIDE BLD-SCNC: 89 MMOL/L (ref 98–107)
CO2: 31 MMOL/L (ref 20–31)
CREAT SERPL-MCNC: 2.22 MG/DL (ref 0.7–1.2)
GFR AFRICAN AMERICAN: 38 ML/MIN
GFR NON-AFRICAN AMERICAN: 31 ML/MIN
GFR SERPL CREATININE-BSD FRML MDRD: ABNORMAL ML/MIN/{1.73_M2}
GFR SERPL CREATININE-BSD FRML MDRD: ABNORMAL ML/MIN/{1.73_M2}
GLUCOSE BLD-MCNC: 133 MG/DL (ref 70–99)
POTASSIUM SERPL-SCNC: 5.1 MMOL/L (ref 3.7–5.3)
SODIUM BLD-SCNC: 135 MMOL/L (ref 135–144)

## 2017-07-24 PROCEDURE — 80048 BASIC METABOLIC PNL TOTAL CA: CPT

## 2017-07-24 PROCEDURE — 36415 COLL VENOUS BLD VENIPUNCTURE: CPT

## 2017-07-26 ENCOUNTER — PATIENT MESSAGE (OUTPATIENT)
Dept: FAMILY MEDICINE CLINIC | Age: 52
End: 2017-07-26

## 2017-07-31 ENCOUNTER — HOSPITAL ENCOUNTER (OUTPATIENT)
Age: 52
Discharge: HOME | End: 2017-07-31
Payer: COMMERCIAL

## 2017-07-31 DIAGNOSIS — E11.22 TYPE 2 DIABETES MELLITUS WITH STAGE 3 CHRONIC KIDNEY DISEASE, WITH LONG-TERM CURRENT USE OF INSULIN (HCC): ICD-10-CM

## 2017-07-31 DIAGNOSIS — N18.30 TYPE 2 DIABETES MELLITUS WITH STAGE 3 CHRONIC KIDNEY DISEASE, WITH LONG-TERM CURRENT USE OF INSULIN (HCC): ICD-10-CM

## 2017-07-31 DIAGNOSIS — Z79.4 TYPE 2 DIABETES MELLITUS WITH STAGE 3 CHRONIC KIDNEY DISEASE, WITH LONG-TERM CURRENT USE OF INSULIN (HCC): ICD-10-CM

## 2017-07-31 LAB
ANION GAP SERPL CALCULATED.3IONS-SCNC: 15 MMOL/L (ref 9–17)
BUN BLDV-MCNC: 39 MG/DL (ref 6–20)
BUN/CREAT BLD: ABNORMAL (ref 9–20)
CALCIUM SERPL-MCNC: 9.3 MG/DL (ref 8.6–10.4)
CHLORIDE BLD-SCNC: 98 MMOL/L (ref 98–107)
CO2: 25 MMOL/L (ref 20–31)
CREAT SERPL-MCNC: 1.48 MG/DL (ref 0.7–1.2)
GFR AFRICAN AMERICAN: >60 ML/MIN
GFR NON-AFRICAN AMERICAN: 50 ML/MIN
GFR SERPL CREATININE-BSD FRML MDRD: ABNORMAL ML/MIN/{1.73_M2}
GFR SERPL CREATININE-BSD FRML MDRD: ABNORMAL ML/MIN/{1.73_M2}
GLUCOSE BLD-MCNC: 111 MG/DL (ref 70–99)
POTASSIUM SERPL-SCNC: 4.5 MMOL/L (ref 3.7–5.3)
SODIUM BLD-SCNC: 138 MMOL/L (ref 135–144)

## 2017-07-31 PROCEDURE — 80048 BASIC METABOLIC PNL TOTAL CA: CPT

## 2017-07-31 PROCEDURE — 36415 COLL VENOUS BLD VENIPUNCTURE: CPT

## 2017-08-15 DIAGNOSIS — G89.29 CHRONIC BACK PAIN GREATER THAN 3 MONTHS DURATION: ICD-10-CM

## 2017-08-15 DIAGNOSIS — M54.9 CHRONIC BACK PAIN GREATER THAN 3 MONTHS DURATION: ICD-10-CM

## 2017-08-15 RX ORDER — TIZANIDINE 4 MG/1
TABLET ORAL
Qty: 90 TABLET | Refills: 5 | Status: SHIPPED | OUTPATIENT
Start: 2017-08-15 | End: 2018-05-03 | Stop reason: SDUPTHER

## 2017-08-16 ENCOUNTER — HOSPITAL ENCOUNTER (OUTPATIENT)
Age: 52
Discharge: HOME | End: 2017-08-16
Payer: COMMERCIAL

## 2017-08-16 LAB
ALBUMIN SERPL-MCNC: 4.1 G/DL (ref 3.5–5.2)
ALBUMIN/GLOBULIN RATIO: ABNORMAL (ref 1–2.5)
ALP BLD-CCNC: 65 U/L (ref 40–129)
ALT SERPL-CCNC: 15 U/L (ref 5–41)
ANION GAP SERPL CALCULATED.3IONS-SCNC: 18 MMOL/L (ref 9–17)
ANION GAP SERPL CALCULATED.3IONS-SCNC: 18 MMOL/L (ref 9–17)
AST SERPL-CCNC: 15 U/L
AVERAGE GLUCOSE: NORMAL
AVERAGE GLUCOSE: NORMAL
BILIRUB SERPL-MCNC: 0.31 MG/DL (ref 0.3–1.2)
BUN BLDV-MCNC: 26 MG/DL (ref 6–20)
BUN BLDV-MCNC: 26 MG/DL (ref 6–20)
BUN/CREAT BLD: ABNORMAL (ref 9–20)
BUN/CREAT BLD: ABNORMAL (ref 9–20)
CALCIUM SERPL-MCNC: 9.3 MG/DL (ref 8.6–10.4)
CALCIUM SERPL-MCNC: 9.3 MG/DL (ref 8.6–10.4)
CHLORIDE BLD-SCNC: 102 MMOL/L (ref 98–107)
CHLORIDE BLD-SCNC: 102 MMOL/L (ref 98–107)
CHOLESTEROL/HDL RATIO: 6.4
CHOLESTEROL: 172 MG/DL
CO2: 23 MMOL/L (ref 20–31)
CO2: 23 MMOL/L (ref 20–31)
CREAT SERPL-MCNC: 1.44 MG/DL (ref 0.7–1.2)
CREAT SERPL-MCNC: 1.44 MG/DL (ref 0.7–1.2)
CREATININE URINE: 383.9 MG/DL (ref 39–259)
GFR AFRICAN AMERICAN: >60 ML/MIN
GFR AFRICAN AMERICAN: >60 ML/MIN
GFR NON-AFRICAN AMERICAN: 52 ML/MIN
GFR NON-AFRICAN AMERICAN: 52 ML/MIN
GFR SERPL CREATININE-BSD FRML MDRD: ABNORMAL ML/MIN/{1.73_M2}
GLUCOSE BLD-MCNC: 81 MG/DL (ref 70–99)
GLUCOSE BLD-MCNC: 81 MG/DL (ref 70–99)
HBA1C MFR BLD: 8.6 %
HBA1C MFR BLD: 8.6 %
HDLC SERPL-MCNC: 27 MG/DL
LDL CHOLESTEROL: 89 MG/DL (ref 0–130)
MICROALBUMIN/CREAT 24H UR: <12 MG/L
MICROALBUMIN/CREAT UR-RTO: 3 MCG/MG CREAT
POTASSIUM SERPL-SCNC: 4.1 MMOL/L (ref 3.7–5.3)
POTASSIUM SERPL-SCNC: 4.1 MMOL/L (ref 3.7–5.3)
SODIUM BLD-SCNC: 143 MMOL/L (ref 135–144)
SODIUM BLD-SCNC: 143 MMOL/L (ref 135–144)
TOTAL PROTEIN: 7.2 G/DL (ref 6.4–8.3)
TRIGL SERPL-MCNC: 278 MG/DL
VLDLC SERPL CALC-MCNC: ABNORMAL MG/DL (ref 1–30)

## 2017-08-16 PROCEDURE — 82570 ASSAY OF URINE CREATININE: CPT

## 2017-08-16 PROCEDURE — 80061 LIPID PANEL: CPT

## 2017-08-16 PROCEDURE — 82043 UR ALBUMIN QUANTITATIVE: CPT

## 2017-08-16 PROCEDURE — 80053 COMPREHEN METABOLIC PANEL: CPT

## 2017-08-16 PROCEDURE — 36415 COLL VENOUS BLD VENIPUNCTURE: CPT

## 2017-08-16 PROCEDURE — 80048 BASIC METABOLIC PNL TOTAL CA: CPT

## 2017-08-18 ENCOUNTER — OFFICE VISIT (OUTPATIENT)
Dept: FAMILY MEDICINE CLINIC | Age: 52
End: 2017-08-18
Payer: MEDICAID

## 2017-08-18 VITALS
HEIGHT: 72 IN | HEART RATE: 75 BPM | OXYGEN SATURATION: 99 % | TEMPERATURE: 98.1 F | DIASTOLIC BLOOD PRESSURE: 67 MMHG | WEIGHT: 315 LBS | BODY MASS INDEX: 42.66 KG/M2 | SYSTOLIC BLOOD PRESSURE: 134 MMHG

## 2017-08-18 DIAGNOSIS — R60.0 BILATERAL LEG EDEMA: ICD-10-CM

## 2017-08-18 DIAGNOSIS — G47.01 INSOMNIA DUE TO MEDICAL CONDITION: ICD-10-CM

## 2017-08-18 DIAGNOSIS — Z79.4 TYPE 2 DIABETES MELLITUS WITH DIABETIC NEUROPATHY, WITH LONG-TERM CURRENT USE OF INSULIN (HCC): ICD-10-CM

## 2017-08-18 DIAGNOSIS — M54.42 CHRONIC MIDLINE LOW BACK PAIN WITH LEFT-SIDED SCIATICA: ICD-10-CM

## 2017-08-18 DIAGNOSIS — G89.29 CHRONIC MIDLINE LOW BACK PAIN WITH LEFT-SIDED SCIATICA: ICD-10-CM

## 2017-08-18 DIAGNOSIS — M89.9 BONE DISORDER: ICD-10-CM

## 2017-08-18 DIAGNOSIS — Z99.89 OSA ON CPAP: ICD-10-CM

## 2017-08-18 DIAGNOSIS — J44.9 CHRONIC OBSTRUCTIVE PULMONARY DISEASE, UNSPECIFIED COPD TYPE (HCC): ICD-10-CM

## 2017-08-18 DIAGNOSIS — J43.9 PULMONARY EMPHYSEMA, UNSPECIFIED EMPHYSEMA TYPE (HCC): ICD-10-CM

## 2017-08-18 DIAGNOSIS — E11.40 TYPE 2 DIABETES MELLITUS WITH DIABETIC NEUROPATHY, WITH LONG-TERM CURRENT USE OF INSULIN (HCC): ICD-10-CM

## 2017-08-18 DIAGNOSIS — Z78.9 INTOLERANCE OF CONTINUOUS POSITIVE AIRWAY PRESSURE (CPAP) VENTILATION: ICD-10-CM

## 2017-08-18 DIAGNOSIS — G47.33 OSA ON CPAP: ICD-10-CM

## 2017-08-18 DIAGNOSIS — I50.32 CHRONIC DIASTOLIC CONGESTIVE HEART FAILURE (HCC): Primary | ICD-10-CM

## 2017-08-18 DIAGNOSIS — F33.1 MODERATE RECURRENT MAJOR DEPRESSION (HCC): ICD-10-CM

## 2017-08-18 PROBLEM — L03.115 CELLULITIS OF RIGHT LOWER EXTREMITY: Status: RESOLVED | Noted: 2017-07-20 | Resolved: 2017-08-18

## 2017-08-18 PROBLEM — L03.116 CELLULITIS OF LEFT LOWER EXTREMITY: Status: RESOLVED | Noted: 2017-07-20 | Resolved: 2017-08-18

## 2017-08-18 PROCEDURE — 99215 OFFICE O/P EST HI 40 MIN: CPT | Performed by: FAMILY MEDICINE

## 2017-08-18 PROCEDURE — 96127 BRIEF EMOTIONAL/BEHAV ASSMT: CPT | Performed by: FAMILY MEDICINE

## 2017-08-18 RX ORDER — OXYCODONE HYDROCHLORIDE 10 MG/1
10 TABLET ORAL EVERY 8 HOURS PRN
Qty: 90 TABLET | Refills: 0 | Status: SHIPPED | OUTPATIENT
Start: 2017-08-18 | End: 2017-09-18 | Stop reason: SDUPTHER

## 2017-08-18 ASSESSMENT — ENCOUNTER SYMPTOMS
DIARRHEA: 0
VOMITING: 0
NAUSEA: 0
SHORTNESS OF BREATH: 1
CONSTIPATION: 0
ABDOMINAL PAIN: 0
CHEST TIGHTNESS: 0
WHEEZING: 0
BACK PAIN: 1
COUGH: 0

## 2017-08-18 ASSESSMENT — ANXIETY QUESTIONNAIRES
5. BEING SO RESTLESS THAT IT IS HARD TO SIT STILL: 2-OVER HALF THE DAYS
4. TROUBLE RELAXING: 2-OVER HALF THE DAYS
2. NOT BEING ABLE TO STOP OR CONTROL WORRYING: 2-OVER HALF THE DAYS
6. BECOMING EASILY ANNOYED OR IRRITABLE: 1-SEVERAL DAYS
7. FEELING AFRAID AS IF SOMETHING AWFUL MIGHT HAPPEN: 0-NOT AT ALL SURE
GAD7 TOTAL SCORE: 12
3. WORRYING TOO MUCH ABOUT DIFFERENT THINGS: 2-OVER HALF THE DAYS
1. FEELING NERVOUS, ANXIOUS, OR ON EDGE: 3-NEARLY EVERY DAY

## 2017-08-18 ASSESSMENT — PATIENT HEALTH QUESTIONNAIRE - PHQ9
SUM OF ALL RESPONSES TO PHQ QUESTIONS 1-9: 14
6. FEELING BAD ABOUT YOURSELF - OR THAT YOU ARE A FAILURE OR HAVE LET YOURSELF OR YOUR FAMILY DOWN: 1
3. TROUBLE FALLING OR STAYING ASLEEP: 3
2. FEELING DOWN, DEPRESSED OR HOPELESS: 1
5. POOR APPETITE OR OVEREATING: 3
1. LITTLE INTEREST OR PLEASURE IN DOING THINGS: 1
9. THOUGHTS THAT YOU WOULD BE BETTER OFF DEAD, OR OF HURTING YOURSELF: 0
7. TROUBLE CONCENTRATING ON THINGS, SUCH AS READING THE NEWSPAPER OR WATCHING TELEVISION: 2
SUM OF ALL RESPONSES TO PHQ9 QUESTIONS 1 & 2: 2
4. FEELING TIRED OR HAVING LITTLE ENERGY: 2
8. MOVING OR SPEAKING SO SLOWLY THAT OTHER PEOPLE COULD HAVE NOTICED. OR THE OPPOSITE, BEING SO FIGETY OR RESTLESS THAT YOU HAVE BEEN MOVING AROUND A LOT MORE THAN USUAL: 1
10. IF YOU CHECKED OFF ANY PROBLEMS, HOW DIFFICULT HAVE THESE PROBLEMS MADE IT FOR YOU TO DO YOUR WORK, TAKE CARE OF THINGS AT HOME, OR GET ALONG WITH OTHER PEOPLE: 1

## 2017-08-20 ASSESSMENT — ENCOUNTER SYMPTOMS
ABDOMINAL DISTENTION: 1
APNEA: 1

## 2017-08-28 ENCOUNTER — HOSPITAL ENCOUNTER (OUTPATIENT)
Dept: OCCUPATIONAL THERAPY | Age: 52
Setting detail: THERAPIES SERIES
Discharge: HOME | End: 2017-08-28
Payer: COMMERCIAL

## 2017-08-29 ENCOUNTER — HOSPITAL ENCOUNTER (OUTPATIENT)
Age: 52
Discharge: HOME | End: 2017-08-29
Payer: MEDICAID

## 2017-08-29 ENCOUNTER — HOSPITAL ENCOUNTER (OUTPATIENT)
Dept: NON INVASIVE DIAGNOSTICS | Age: 52
Discharge: HOME | End: 2017-08-29
Payer: MEDICAID

## 2017-08-29 ENCOUNTER — HOSPITAL ENCOUNTER (OUTPATIENT)
Dept: PULMONOLOGY | Age: 52
Discharge: HOME | End: 2017-08-29
Payer: MEDICAID

## 2017-08-29 DIAGNOSIS — J43.9 PULMONARY EMPHYSEMA, UNSPECIFIED EMPHYSEMA TYPE (HCC): ICD-10-CM

## 2017-08-29 DIAGNOSIS — E11.22 TYPE 2 DIABETES MELLITUS WITH STAGE 3 CHRONIC KIDNEY DISEASE, WITH LONG-TERM CURRENT USE OF INSULIN (HCC): ICD-10-CM

## 2017-08-29 DIAGNOSIS — N18.30 TYPE 2 DIABETES MELLITUS WITH STAGE 3 CHRONIC KIDNEY DISEASE, WITH LONG-TERM CURRENT USE OF INSULIN (HCC): ICD-10-CM

## 2017-08-29 DIAGNOSIS — Z79.4 TYPE 2 DIABETES MELLITUS WITH STAGE 3 CHRONIC KIDNEY DISEASE, WITH LONG-TERM CURRENT USE OF INSULIN (HCC): ICD-10-CM

## 2017-08-29 DIAGNOSIS — I50.32 CHRONIC DIASTOLIC CONGESTIVE HEART FAILURE (HCC): ICD-10-CM

## 2017-08-29 LAB
ANION GAP SERPL CALCULATED.3IONS-SCNC: 14 MMOL/L (ref 9–17)
BUN BLDV-MCNC: 25 MG/DL (ref 6–20)
BUN/CREAT BLD: ABNORMAL (ref 9–20)
CALCIUM SERPL-MCNC: 9.7 MG/DL (ref 8.6–10.4)
CHLORIDE BLD-SCNC: 99 MMOL/L (ref 98–107)
CO2: 27 MMOL/L (ref 20–31)
CREAT SERPL-MCNC: 1.38 MG/DL (ref 0.7–1.2)
GFR AFRICAN AMERICAN: >60 ML/MIN
GFR NON-AFRICAN AMERICAN: 54 ML/MIN
GFR SERPL CREATININE-BSD FRML MDRD: ABNORMAL ML/MIN/{1.73_M2}
GFR SERPL CREATININE-BSD FRML MDRD: ABNORMAL ML/MIN/{1.73_M2}
GLUCOSE BLD-MCNC: 129 MG/DL (ref 70–99)
LEFT VENTRICULAR EJECTION FRACTION MODE: NORMAL
LV EF: 55 % (ref 55–60)
LV EF: 58 %
LVEF MODALITY: NORMAL
POTASSIUM SERPL-SCNC: 4.5 MMOL/L (ref 3.7–5.3)
SODIUM BLD-SCNC: 140 MMOL/L (ref 135–144)

## 2017-08-29 PROCEDURE — 94726 PLETHYSMOGRAPHY LUNG VOLUMES: CPT

## 2017-08-29 PROCEDURE — 80048 BASIC METABOLIC PNL TOTAL CA: CPT

## 2017-08-29 PROCEDURE — 94060 EVALUATION OF WHEEZING: CPT

## 2017-08-29 PROCEDURE — 94664 DEMO&/EVAL PT USE INHALER: CPT

## 2017-08-29 PROCEDURE — 94727 GAS DIL/WSHOT DETER LNG VOL: CPT

## 2017-08-29 PROCEDURE — 94729 DIFFUSING CAPACITY: CPT

## 2017-08-29 PROCEDURE — 36415 COLL VENOUS BLD VENIPUNCTURE: CPT

## 2017-08-29 PROCEDURE — 94728 AIRWY RESIST BY OSCILLOMETRY: CPT

## 2017-08-29 PROCEDURE — 94640 AIRWAY INHALATION TREATMENT: CPT

## 2017-08-29 PROCEDURE — 88738 HGB QUANT TRANSCUTANEOUS: CPT

## 2017-08-29 PROCEDURE — 93306 TTE W/DOPPLER COMPLETE: CPT

## 2017-09-06 ENCOUNTER — HOSPITAL ENCOUNTER (OUTPATIENT)
Dept: SLEEP CENTER | Age: 52
Discharge: HOME | End: 2017-09-06
Payer: MEDICAID

## 2017-09-06 DIAGNOSIS — G47.33 OSA (OBSTRUCTIVE SLEEP APNEA): Primary | ICD-10-CM

## 2017-09-06 PROCEDURE — 95811 POLYSOM 6/>YRS CPAP 4/> PARM: CPT

## 2017-09-07 ENCOUNTER — HOSPITAL ENCOUNTER (OUTPATIENT)
Dept: OCCUPATIONAL THERAPY | Age: 52
Setting detail: THERAPIES SERIES
Discharge: HOME | End: 2017-09-07
Payer: COMMERCIAL

## 2017-09-07 VITALS
HEIGHT: 72 IN | WEIGHT: 315 LBS | RESPIRATION RATE: 18 BRPM | BODY MASS INDEX: 42.66 KG/M2 | HEART RATE: 83 BPM | SYSTOLIC BLOOD PRESSURE: 134 MMHG | DIASTOLIC BLOOD PRESSURE: 67 MMHG

## 2017-09-07 PROCEDURE — 97530 THERAPEUTIC ACTIVITIES: CPT

## 2017-09-07 PROCEDURE — G8987 SELF CARE CURRENT STATUS: HCPCS

## 2017-09-07 PROCEDURE — 97140 MANUAL THERAPY 1/> REGIONS: CPT

## 2017-09-07 PROCEDURE — G8988 SELF CARE GOAL STATUS: HCPCS

## 2017-09-07 PROCEDURE — 97165 OT EVAL LOW COMPLEX 30 MIN: CPT

## 2017-09-07 ASSESSMENT — SLEEP AND FATIGUE QUESTIONNAIRES
HOW LIKELY ARE YOU TO NOD OFF OR FALL ASLEEP WHEN YOU ARE A PASSENGER IN A CAR FOR AN HOUR WITHOUT A BREAK: 1
HOW LIKELY ARE YOU TO NOD OFF OR FALL ASLEEP WHILE WATCHING TV: 1
HOW LIKELY ARE YOU TO NOD OFF OR FALL ASLEEP WHILE SITTING INACTIVE IN A PUBLIC PLACE: 1
NECK CIRCUMFERENCE (INCHES): 57
HOW LIKELY ARE YOU TO NOD OFF OR FALL ASLEEP WHILE SITTING AND READING: 0
HOW LIKELY ARE YOU TO NOD OFF OR FALL ASLEEP WHILE SITTING QUIETLY AFTER LUNCH WITHOUT ALCOHOL: 2
HOW LIKELY ARE YOU TO NOD OFF OR FALL ASLEEP WHILE LYING DOWN TO REST IN THE AFTERNOON WHEN CIRCUMSTANCES PERMIT: 2
ESS TOTAL SCORE: 8
HOW LIKELY ARE YOU TO NOD OFF OR FALL ASLEEP WHILE SITTING AND TALKING TO SOMEONE: 1
HOW LIKELY ARE YOU TO NOD OFF OR FALL ASLEEP IN A CAR, WHILE STOPPED FOR A FEW MINUTES IN TRAFFIC: 0

## 2017-09-08 ENCOUNTER — PATIENT MESSAGE (OUTPATIENT)
Dept: FAMILY MEDICINE CLINIC | Age: 52
End: 2017-09-08

## 2017-09-08 DIAGNOSIS — L85.3 DRY SKIN DERMATITIS: Primary | ICD-10-CM

## 2017-09-08 DIAGNOSIS — J01.91 ACUTE RECURRENT SINUSITIS, UNSPECIFIED LOCATION: Primary | ICD-10-CM

## 2017-09-08 DIAGNOSIS — R05.8 COUGH PRODUCTIVE OF PURULENT SPUTUM: ICD-10-CM

## 2017-09-08 RX ORDER — AMOXICILLIN AND CLAVULANATE POTASSIUM 875; 125 MG/1; MG/1
1 TABLET, FILM COATED ORAL EVERY 12 HOURS
Qty: 20 TABLET | Refills: 0 | Status: SHIPPED | OUTPATIENT
Start: 2017-09-08 | End: 2017-09-18

## 2017-09-08 RX ORDER — AMMONIUM LACTATE 12 G/100G
LOTION TOPICAL
Qty: 567 G | Refills: 2 | Status: SHIPPED | OUTPATIENT
Start: 2017-09-08 | End: 2018-10-02

## 2017-09-08 RX ORDER — GUAIFENESIN AND CODEINE PHOSPHATE 100; 10 MG/5ML; MG/5ML
5 SOLUTION ORAL 4 TIMES DAILY PRN
Qty: 140 ML | Refills: 0 | Status: SHIPPED | OUTPATIENT
Start: 2017-09-08 | End: 2017-09-15

## 2017-09-09 ENCOUNTER — HOSPITAL ENCOUNTER (OUTPATIENT)
Age: 52
Discharge: HOME | End: 2017-09-09
Payer: COMMERCIAL

## 2017-09-09 LAB
ANION GAP SERPL CALCULATED.3IONS-SCNC: 13 MMOL/L (ref 9–17)
BUN BLDV-MCNC: 26 MG/DL (ref 6–20)
BUN/CREAT BLD: ABNORMAL (ref 9–20)
CALCIUM SERPL-MCNC: 9 MG/DL (ref 8.6–10.4)
CHLORIDE BLD-SCNC: 97 MMOL/L (ref 98–107)
CO2: 29 MMOL/L (ref 20–31)
CREAT SERPL-MCNC: 1.37 MG/DL (ref 0.7–1.2)
GFR AFRICAN AMERICAN: >60 ML/MIN
GFR NON-AFRICAN AMERICAN: 55 ML/MIN
GFR SERPL CREATININE-BSD FRML MDRD: ABNORMAL ML/MIN/{1.73_M2}
GFR SERPL CREATININE-BSD FRML MDRD: ABNORMAL ML/MIN/{1.73_M2}
GLUCOSE BLD-MCNC: 335 MG/DL (ref 70–99)
POTASSIUM SERPL-SCNC: 4.7 MMOL/L (ref 3.7–5.3)
SODIUM BLD-SCNC: 139 MMOL/L (ref 135–144)

## 2017-09-09 PROCEDURE — 36415 COLL VENOUS BLD VENIPUNCTURE: CPT

## 2017-09-09 PROCEDURE — 80048 BASIC METABOLIC PNL TOTAL CA: CPT

## 2017-09-14 ENCOUNTER — HOSPITAL ENCOUNTER (OUTPATIENT)
Dept: OCCUPATIONAL THERAPY | Age: 52
Setting detail: THERAPIES SERIES
Discharge: HOME | End: 2017-09-14
Payer: COMMERCIAL

## 2017-09-14 DIAGNOSIS — Z79.4 TYPE 2 DIABETES MELLITUS WITH DIABETIC NEUROPATHY, WITH LONG-TERM CURRENT USE OF INSULIN (HCC): ICD-10-CM

## 2017-09-14 DIAGNOSIS — E11.40 TYPE 2 DIABETES MELLITUS WITH DIABETIC NEUROPATHY, WITH LONG-TERM CURRENT USE OF INSULIN (HCC): ICD-10-CM

## 2017-09-14 PROCEDURE — 97140 MANUAL THERAPY 1/> REGIONS: CPT

## 2017-09-14 PROCEDURE — 97530 THERAPEUTIC ACTIVITIES: CPT

## 2017-09-18 DIAGNOSIS — G47.01 INSOMNIA DUE TO MEDICAL CONDITION: ICD-10-CM

## 2017-09-18 DIAGNOSIS — G89.29 CHRONIC MIDLINE LOW BACK PAIN WITH LEFT-SIDED SCIATICA: ICD-10-CM

## 2017-09-18 DIAGNOSIS — M54.42 CHRONIC MIDLINE LOW BACK PAIN WITH LEFT-SIDED SCIATICA: ICD-10-CM

## 2017-09-18 RX ORDER — OXYCODONE HYDROCHLORIDE 10 MG/1
10 TABLET ORAL EVERY 8 HOURS PRN
Qty: 90 TABLET | Refills: 0 | Status: SHIPPED | OUTPATIENT
Start: 2017-09-18 | End: 2017-10-18 | Stop reason: SDUPTHER

## 2017-09-19 DIAGNOSIS — G47.01 INSOMNIA DUE TO MEDICAL CONDITION: ICD-10-CM

## 2017-09-19 DIAGNOSIS — F33.2 MDD (MAJOR DEPRESSIVE DISORDER), RECURRENT SEVERE, WITHOUT PSYCHOSIS (HCC): ICD-10-CM

## 2017-09-19 DIAGNOSIS — F41.9 ANXIETY: ICD-10-CM

## 2017-09-20 RX ORDER — VENLAFAXINE 75 MG/1
TABLET ORAL
Qty: 60 TABLET | Refills: 0 | Status: SHIPPED | OUTPATIENT
Start: 2017-09-20 | End: 2017-10-25 | Stop reason: SDUPTHER

## 2017-09-21 ENCOUNTER — HOSPITAL ENCOUNTER (OUTPATIENT)
Dept: OCCUPATIONAL THERAPY | Age: 52
Setting detail: THERAPIES SERIES
Discharge: HOME | End: 2017-09-21
Payer: COMMERCIAL

## 2017-09-21 PROCEDURE — 97140 MANUAL THERAPY 1/> REGIONS: CPT

## 2017-09-21 PROCEDURE — 97530 THERAPEUTIC ACTIVITIES: CPT

## 2017-09-26 ENCOUNTER — OFFICE VISIT (OUTPATIENT)
Dept: PULMONOLOGY | Age: 52
End: 2017-09-26
Payer: COMMERCIAL

## 2017-09-26 VITALS
DIASTOLIC BLOOD PRESSURE: 62 MMHG | BODY MASS INDEX: 42.66 KG/M2 | RESPIRATION RATE: 22 BRPM | OXYGEN SATURATION: 94 % | WEIGHT: 315 LBS | HEIGHT: 72 IN | SYSTOLIC BLOOD PRESSURE: 138 MMHG | HEART RATE: 120 BPM

## 2017-09-26 DIAGNOSIS — I25.10 CORONARY ARTERY DISEASE INVOLVING NATIVE CORONARY ARTERY OF NATIVE HEART WITHOUT ANGINA PECTORIS: ICD-10-CM

## 2017-09-26 DIAGNOSIS — N40.0 BENIGN NODULAR PROSTATIC HYPERPLASIA WITHOUT LOWER URINARY TRACT SYMPTOMS: ICD-10-CM

## 2017-09-26 DIAGNOSIS — G47.33 OSA (OBSTRUCTIVE SLEEP APNEA): Primary | ICD-10-CM

## 2017-09-26 DIAGNOSIS — M48.00 SPINAL STENOSIS, UNSPECIFIED SPINAL REGION: ICD-10-CM

## 2017-09-26 DIAGNOSIS — I10 ESSENTIAL HYPERTENSION: ICD-10-CM

## 2017-09-26 DIAGNOSIS — E66.01 MORBID OBESITY DUE TO EXCESS CALORIES (HCC): ICD-10-CM

## 2017-09-26 DIAGNOSIS — J44.9 COPD WITH CHRONIC BRONCHITIS AND EMPHYSEMA (HCC): ICD-10-CM

## 2017-09-26 PROCEDURE — 99205 OFFICE O/P NEW HI 60 MIN: CPT | Performed by: INTERNAL MEDICINE

## 2017-09-26 RX ORDER — ALPRAZOLAM 0.25 MG/1
0.25 TABLET ORAL DAILY
Qty: 30 TABLET | Refills: 0 | Status: SHIPPED | OUTPATIENT
Start: 2017-09-26 | End: 2017-10-26

## 2017-09-27 ENCOUNTER — HOSPITAL ENCOUNTER (OUTPATIENT)
Age: 52
Discharge: HOME | End: 2017-09-27
Payer: COMMERCIAL

## 2017-09-27 DIAGNOSIS — E13.22 SECONDARY DIABETES MELLITUS WITH STAGE 3 CHRONIC KIDNEY DISEASE (HCC): ICD-10-CM

## 2017-09-27 DIAGNOSIS — N18.30 CKD (CHRONIC KIDNEY DISEASE) STAGE 3, GFR 30-59 ML/MIN (HCC): ICD-10-CM

## 2017-09-27 DIAGNOSIS — N18.30 BENIGN HYPERTENSION WITH CKD (CHRONIC KIDNEY DISEASE) STAGE III (HCC): ICD-10-CM

## 2017-09-27 DIAGNOSIS — I12.9 BENIGN HYPERTENSION WITH CKD (CHRONIC KIDNEY DISEASE) STAGE III (HCC): ICD-10-CM

## 2017-09-27 DIAGNOSIS — N18.30 SECONDARY DIABETES MELLITUS WITH STAGE 3 CHRONIC KIDNEY DISEASE (HCC): ICD-10-CM

## 2017-09-27 LAB
ABSOLUTE EOS #: 0.3 K/UL (ref 0–0.4)
ABSOLUTE LYMPH #: 1.2 K/UL (ref 1–4.8)
ABSOLUTE MONO #: 0.6 K/UL (ref 0.1–1.3)
ANION GAP SERPL CALCULATED.3IONS-SCNC: 12 MMOL/L (ref 9–17)
BASOPHILS # BLD: 1 %
BASOPHILS ABSOLUTE: 0.1 K/UL (ref 0–0.2)
BUN BLDV-MCNC: 28 MG/DL (ref 6–20)
BUN/CREAT BLD: ABNORMAL (ref 9–20)
CALCIUM SERPL-MCNC: 9.3 MG/DL (ref 8.6–10.4)
CHLORIDE BLD-SCNC: 99 MMOL/L (ref 98–107)
CO2: 27 MMOL/L (ref 20–31)
CREAT SERPL-MCNC: 1.26 MG/DL (ref 0.7–1.2)
DIFFERENTIAL TYPE: ABNORMAL
EOSINOPHILS RELATIVE PERCENT: 4 %
GFR AFRICAN AMERICAN: >60 ML/MIN
GFR NON-AFRICAN AMERICAN: >60 ML/MIN
GFR SERPL CREATININE-BSD FRML MDRD: ABNORMAL ML/MIN/{1.73_M2}
GFR SERPL CREATININE-BSD FRML MDRD: ABNORMAL ML/MIN/{1.73_M2}
GLUCOSE BLD-MCNC: 230 MG/DL (ref 70–99)
HCT VFR BLD CALC: 36.7 % (ref 41–53)
HEMOGLOBIN: 12 G/DL (ref 13.5–17.5)
LYMPHOCYTES # BLD: 17 %
MAGNESIUM: 2.1 MG/DL (ref 1.6–2.6)
MCH RBC QN AUTO: 28.1 PG (ref 26–34)
MCHC RBC AUTO-ENTMCNC: 32.7 G/DL (ref 31–37)
MCV RBC AUTO: 86 FL (ref 80–100)
MONOCYTES # BLD: 8 %
PDW BLD-RTO: 18.2 % (ref 11.5–14.9)
PHOSPHORUS: 3.1 MG/DL (ref 2.5–4.5)
PLATELET # BLD: 168 K/UL (ref 150–450)
PLATELET ESTIMATE: ABNORMAL
PMV BLD AUTO: 9.1 FL (ref 6–12)
POTASSIUM SERPL-SCNC: 5.1 MMOL/L (ref 3.7–5.3)
RBC # BLD: 4.26 M/UL (ref 4.5–5.9)
RBC # BLD: ABNORMAL 10*6/UL
SEG NEUTROPHILS: 70 %
SEGMENTED NEUTROPHILS ABSOLUTE COUNT: 5.3 K/UL (ref 1.3–9.1)
SODIUM BLD-SCNC: 138 MMOL/L (ref 135–144)
WBC # BLD: 7.5 K/UL (ref 3.5–11)
WBC # BLD: ABNORMAL 10*3/UL

## 2017-09-27 PROCEDURE — 80048 BASIC METABOLIC PNL TOTAL CA: CPT

## 2017-09-27 PROCEDURE — 36415 COLL VENOUS BLD VENIPUNCTURE: CPT

## 2017-09-27 PROCEDURE — 85025 COMPLETE CBC W/AUTO DIFF WBC: CPT

## 2017-09-27 PROCEDURE — 84100 ASSAY OF PHOSPHORUS: CPT

## 2017-09-27 PROCEDURE — 83735 ASSAY OF MAGNESIUM: CPT

## 2017-09-28 ENCOUNTER — HOSPITAL ENCOUNTER (OUTPATIENT)
Dept: OCCUPATIONAL THERAPY | Age: 52
Setting detail: THERAPIES SERIES
Discharge: HOME | End: 2017-09-28
Payer: COMMERCIAL

## 2017-09-28 PROCEDURE — 97140 MANUAL THERAPY 1/> REGIONS: CPT

## 2017-10-05 ENCOUNTER — HOSPITAL ENCOUNTER (OUTPATIENT)
Dept: OTHER | Age: 52
Discharge: HOME | End: 2017-10-05
Payer: COMMERCIAL

## 2017-10-05 ENCOUNTER — HOSPITAL ENCOUNTER (OUTPATIENT)
Dept: OCCUPATIONAL THERAPY | Age: 52
Setting detail: THERAPIES SERIES
Discharge: HOME | End: 2017-10-05
Payer: MEDICAID

## 2017-10-05 VITALS
BODY MASS INDEX: 50.3 KG/M2 | WEIGHT: 315 LBS | DIASTOLIC BLOOD PRESSURE: 64 MMHG | HEART RATE: 82 BPM | RESPIRATION RATE: 24 BRPM | OXYGEN SATURATION: 97 % | SYSTOLIC BLOOD PRESSURE: 124 MMHG

## 2017-10-05 PROCEDURE — 99211 OFF/OP EST MAY X REQ PHY/QHP: CPT

## 2017-10-05 PROCEDURE — 97140 MANUAL THERAPY 1/> REGIONS: CPT

## 2017-10-05 NOTE — PROGRESS NOTES
Pt has made significant progress the last few months. Very pleasant, taking his Xanax and sleeping about 5 hours a night now. Pt has stopped smoking completely! He went from 3 packs per day to none in July. Currently attends the lymphedema clinic and is slowly making progress. Seeing new pulmonologist, Dr Eloisa Aragon and states he has made more progress with him in the last month than in the previous year. New echo done and shows improvement. Will recheck in 2 months to encourage and make sure he stays on this track.

## 2017-10-05 NOTE — OP NOTE
[facility]  Lymphedema Services  Daily Treatment Note    Date:10/5/17  Patients Name:Miguel Neil. :63  Gender:male  P:[id number]  Insurance-medicare, [employer] health plan  Referring Priya Harris MD  Time:12:50-1:30 `   Visit number:5  Charges: 3 manual therapy  Daily Charge: $  73.19  Previous Total: 306.58  Current Total: $379.77     Restrictions/Precautions:  [] No blood pressure/blood draw in: [] Left Upper Extremity [] Right Upper Extremity      [x] Fall Risk -no      Intervention:   []Other    Pain:  [] No    [x] Yes  Location:BLEs  Pain Rating (0-10 pain scale):8/10  Pain Description:neuropathy pan   Interruption of Treatment [] Yes  [x] No    Came to Clinic:  [x] Bandaged  B LE  []Unbandaged  ---  [] With Stocking   ---  [] With Sleeve   ---  [] Kinesiotaped  ---  [] Dana Shape  ---  [] With Alternative Compression:    Skin Integrity:  [] Normal [x] Dry  [x]Rough []Moist  []Rash  Location of problem area/s: BLEs  [] Wound: Location/Description   [x] Fibrosis: Location/Description-BLEs/ankles  [x] Keratosis: Location/Description-minimal to bilateral feet/ankles  [x] Papillomas: Location/Description-posterior LLE  [] Other    Color:  [] Normal [x] Mottled [x] Flushed [] Other/Description  Location of problem area/s:     Edema:  Edema Location:BLEs  [] 1+ Edema [] 2+ Edema  [x] 3+ Edema [] 4+ Edema  Edema Location:  [] 1+ Edema [] 2+ Edema  [] 3+ Edema [] 4+ Edema  Edema Location:  [] 1+ Edema [] 2+ Edema  [] 3+ Edema [] 4+ Edema    Subjective:\"I have been wrapping them every day myself or my daughter helps me\"  Objective:  [x] Measurement [x]Manual Lymph Drainage  [x]Bandaging   []Kinesiotaping [x] Education    [x]Self Massage   [x]Self Bandaging [] Exercise    []Wound Care  []Hygiene  [] Other          Measurements Upper Extremity:  Measurements are made in 4 cm increments and are circumferential. Fingers are measured at base.    CM

## 2017-10-18 DIAGNOSIS — G89.29 CHRONIC MIDLINE LOW BACK PAIN WITH LEFT-SIDED SCIATICA: ICD-10-CM

## 2017-10-18 DIAGNOSIS — G47.01 INSOMNIA DUE TO MEDICAL CONDITION: ICD-10-CM

## 2017-10-18 DIAGNOSIS — M54.42 CHRONIC MIDLINE LOW BACK PAIN WITH LEFT-SIDED SCIATICA: ICD-10-CM

## 2017-10-18 RX ORDER — OXYCODONE HYDROCHLORIDE 10 MG/1
10 TABLET ORAL EVERY 8 HOURS PRN
Qty: 90 TABLET | Refills: 0 | Status: SHIPPED | OUTPATIENT
Start: 2017-10-18 | End: 2017-11-15 | Stop reason: SDUPTHER

## 2017-10-19 ENCOUNTER — HOSPITAL ENCOUNTER (OUTPATIENT)
Age: 52
Discharge: HOME | End: 2017-10-19
Payer: COMMERCIAL

## 2017-10-19 DIAGNOSIS — N18.30 CKD (CHRONIC KIDNEY DISEASE) STAGE 3, GFR 30-59 ML/MIN (HCC): ICD-10-CM

## 2017-10-19 DIAGNOSIS — N18.30 BENIGN HYPERTENSION WITH CKD (CHRONIC KIDNEY DISEASE) STAGE III (HCC): ICD-10-CM

## 2017-10-19 DIAGNOSIS — N18.30 SECONDARY DIABETES MELLITUS WITH STAGE 3 CHRONIC KIDNEY DISEASE (HCC): ICD-10-CM

## 2017-10-19 DIAGNOSIS — E13.22 SECONDARY DIABETES MELLITUS WITH STAGE 3 CHRONIC KIDNEY DISEASE (HCC): ICD-10-CM

## 2017-10-19 DIAGNOSIS — I12.9 BENIGN HYPERTENSION WITH CKD (CHRONIC KIDNEY DISEASE) STAGE III (HCC): ICD-10-CM

## 2017-10-19 LAB
ANION GAP SERPL CALCULATED.3IONS-SCNC: 13 MMOL/L (ref 9–17)
BUN BLDV-MCNC: 29 MG/DL (ref 6–20)
BUN/CREAT BLD: ABNORMAL (ref 9–20)
CALCIUM SERPL-MCNC: 9.9 MG/DL (ref 8.6–10.4)
CHLORIDE BLD-SCNC: 100 MMOL/L (ref 98–107)
CO2: 26 MMOL/L (ref 20–31)
CREAT SERPL-MCNC: 1.22 MG/DL (ref 0.7–1.2)
GFR AFRICAN AMERICAN: >60 ML/MIN
GFR NON-AFRICAN AMERICAN: >60 ML/MIN
GFR SERPL CREATININE-BSD FRML MDRD: ABNORMAL ML/MIN/{1.73_M2}
GFR SERPL CREATININE-BSD FRML MDRD: ABNORMAL ML/MIN/{1.73_M2}
GLUCOSE BLD-MCNC: 149 MG/DL (ref 70–99)
POTASSIUM SERPL-SCNC: 4.6 MMOL/L (ref 3.7–5.3)
SODIUM BLD-SCNC: 139 MMOL/L (ref 135–144)

## 2017-10-19 PROCEDURE — 80048 BASIC METABOLIC PNL TOTAL CA: CPT

## 2017-10-19 PROCEDURE — 36415 COLL VENOUS BLD VENIPUNCTURE: CPT

## 2017-10-25 ENCOUNTER — TELEPHONE (OUTPATIENT)
Dept: PULMONOLOGY | Age: 52
End: 2017-10-25

## 2017-10-25 DIAGNOSIS — G47.33 OSA (OBSTRUCTIVE SLEEP APNEA): Primary | ICD-10-CM

## 2017-10-25 DIAGNOSIS — G47.01 INSOMNIA DUE TO MEDICAL CONDITION: ICD-10-CM

## 2017-10-25 DIAGNOSIS — F41.9 ANXIETY: ICD-10-CM

## 2017-10-25 DIAGNOSIS — F33.2 MDD (MAJOR DEPRESSIVE DISORDER), RECURRENT SEVERE, WITHOUT PSYCHOSIS (HCC): ICD-10-CM

## 2017-10-25 RX ORDER — VENLAFAXINE 75 MG/1
TABLET ORAL
Qty: 60 TABLET | Refills: 5 | Status: SHIPPED | OUTPATIENT
Start: 2017-10-25 | End: 2018-05-03 | Stop reason: SDUPTHER

## 2017-10-25 NOTE — TELEPHONE ENCOUNTER
Please Approve or Refuse. Next Visit Date:  11/20/2017    Hemoglobin A1C (%)   Date Value   07/20/2017 8.6   03/23/2017 7.3 (H)   11/09/2016 7.1             ( goal A1C is < 7)   Microalb/Crt.  Ratio (mcg/mg creat)   Date Value   08/16/2017 3     LDL Cholesterol (mg/dL)   Date Value   08/16/2017 89       (goal LDL is <100)   AST (U/L)   Date Value   08/16/2017 15     ALT (U/L)   Date Value   08/16/2017 15     BUN (mg/dL)   Date Value   10/19/2017 29 (H)     BP Readings from Last 3 Encounters:   10/05/17 124/64   09/26/17 138/62   09/07/17 134/67          (goal 120/80)        Patient Active Problem List:     DDD (degenerative disc disease)     Hypertriglyceridemia     CKD (chronic kidney disease) stage 3, GFR 30-59 ml/min     CAD (coronary artery disease) s/p 1 stent     COPD (chronic obstructive pulmonary disease) (HCC)     Type 2 diabetes mellitus with diabetic neuropathy, with long-term current use of insulin (HCC)     Chronic pancreatitis (HCC)     Displacement of lumbar intervertebral disc without myelopathy     Chronic diastolic congestive heart failure (HCC)     Peripheral neuropathy (HCC)     Insomnia     BPH (benign prostatic hyperplasia)     Smoker     Morbid obesity with BMI of 45.0-49.9, adult ([facility]     Essential hypertension     Hepatic steatosis     History of rib fracture     Umbilical hernia     Left inguinal hernia     Adrenal gland anomaly     Lumbar disc narrowing     Stenosis, spinal, lumbar     Chronic back pain greater than 3 months duration     Gastroesophageal reflux disease without esophagitis     Hyperlipidemia with target LDL less than 70     Lower urinary tract symptoms (LUTS)     Vitamin D deficiency     Iron deficiency anemia due to chronic blood loss     BARBY (obstructive sleep apnea), unable to use CPAP     Chronic midline low back pain with left-sided sciatica     Stasis dermatitis of both legs     Anxiety     Positive depression screening     Moderate recurrent major depression

## 2017-11-01 DIAGNOSIS — I50.32 CHRONIC DIASTOLIC CONGESTIVE HEART FAILURE (HCC): ICD-10-CM

## 2017-11-01 RX ORDER — LISINOPRIL 5 MG/1
5 TABLET ORAL DAILY
Qty: 90 TABLET | Refills: 3 | Status: SHIPPED | OUTPATIENT
Start: 2017-11-01 | End: 2018-11-05 | Stop reason: SDUPTHER

## 2017-11-01 NOTE — TELEPHONE ENCOUNTER
Lab Results   Component Value Date     10/19/2017    K 4.6 10/19/2017     10/19/2017    CO2 26 10/19/2017    BUN 29 10/19/2017    CREATININE 1.22 10/19/2017    GLUCOSE 149 10/19/2017    CALCIUM 9.9 10/19/2017

## 2017-11-01 NOTE — TELEPHONE ENCOUNTER
From: Bing Morales. Sent: 11/1/2017 2:34 PM EDT  Subject: Medication Renewal Request    Miguel Sterling. would like a refill of the following medications:  lisinopril (PRINIVIL;ZESTRIL) 5 MG tablet Justin Seymour MD]    Preferred pharmacy: [address] [address] [phone] - F [phone]    Comment:

## 2017-11-02 ENCOUNTER — HOSPITAL ENCOUNTER (INPATIENT)
Age: 52
LOS: 2 days | Discharge: HOME | DRG: 380 | End: 2017-11-04
Attending: EMERGENCY MEDICINE | Admitting: INTERNAL MEDICINE
Payer: COMMERCIAL

## 2017-11-02 ENCOUNTER — HOSPITAL ENCOUNTER (OUTPATIENT)
Dept: OCCUPATIONAL THERAPY | Age: 52
Setting detail: THERAPIES SERIES
Discharge: HOME | End: 2017-11-02
Payer: COMMERCIAL

## 2017-11-02 ENCOUNTER — APPOINTMENT (OUTPATIENT)
Dept: GENERAL RADIOLOGY | Age: 52
DRG: 380 | End: 2017-11-02
Payer: COMMERCIAL

## 2017-11-02 ENCOUNTER — APPOINTMENT (OUTPATIENT)
Dept: CT IMAGING | Age: 52
DRG: 380 | End: 2017-11-02
Payer: COMMERCIAL

## 2017-11-02 DIAGNOSIS — L03.116 BILATERAL CELLULITIS OF LOWER LEG: Primary | ICD-10-CM

## 2017-11-02 DIAGNOSIS — L03.115 BILATERAL CELLULITIS OF LOWER LEG: Primary | ICD-10-CM

## 2017-11-02 LAB
ABSOLUTE EOS #: 0.2 K/UL (ref 0–0.4)
ABSOLUTE IMMATURE GRANULOCYTE: ABNORMAL K/UL (ref 0–0.3)
ABSOLUTE LYMPH #: 1.2 K/UL (ref 1–4.8)
ABSOLUTE MONO #: 0.7 K/UL (ref 0.1–1.3)
ANION GAP SERPL CALCULATED.3IONS-SCNC: 15 MMOL/L (ref 9–17)
BASOPHILS # BLD: 1 %
BASOPHILS ABSOLUTE: 0.1 K/UL (ref 0–0.2)
BNP INTERPRETATION: NORMAL
BUN BLDV-MCNC: 28 MG/DL (ref 6–20)
BUN/CREAT BLD: ABNORMAL (ref 9–20)
C-REACTIVE PROTEIN: 21.3 MG/L (ref 0–5)
CALCIUM SERPL-MCNC: 9.3 MG/DL (ref 8.6–10.4)
CHLORIDE BLD-SCNC: 96 MMOL/L (ref 98–107)
CO2: 26 MMOL/L (ref 20–31)
CREAT SERPL-MCNC: 1.43 MG/DL (ref 0.7–1.2)
DIFFERENTIAL TYPE: ABNORMAL
EOSINOPHILS RELATIVE PERCENT: 2 %
GFR AFRICAN AMERICAN: >60 ML/MIN
GFR NON-AFRICAN AMERICAN: 52 ML/MIN
GFR SERPL CREATININE-BSD FRML MDRD: ABNORMAL ML/MIN/{1.73_M2}
GFR SERPL CREATININE-BSD FRML MDRD: ABNORMAL ML/MIN/{1.73_M2}
GLUCOSE BLD-MCNC: 117 MG/DL (ref 75–110)
GLUCOSE BLD-MCNC: 211 MG/DL (ref 70–99)
GLUCOSE BLD-MCNC: 238 MG/DL (ref 75–110)
HCT VFR BLD CALC: 38.1 % (ref 41–53)
HEMOGLOBIN: 12.6 G/DL (ref 13.5–17.5)
IMMATURE GRANULOCYTES: ABNORMAL %
LACTIC ACID: 2.4 MMOL/L (ref 0.5–2.2)
LYMPHOCYTES # BLD: 13 %
MCH RBC QN AUTO: 28.2 PG (ref 26–34)
MCHC RBC AUTO-ENTMCNC: 33.1 G/DL (ref 31–37)
MCV RBC AUTO: 85.1 FL (ref 80–100)
MONOCYTES # BLD: 7 %
MYOGLOBIN: 86 NG/ML (ref 28–72)
PDW BLD-RTO: 16.5 % (ref 11.5–14.9)
PLATELET # BLD: 205 K/UL (ref 150–450)
PLATELET ESTIMATE: ABNORMAL
PMV BLD AUTO: 9.3 FL (ref 6–12)
POTASSIUM SERPL-SCNC: 4.1 MMOL/L (ref 3.7–5.3)
PRO-BNP: 73 PG/ML
PROCALCITONIN: 0.17 NG/ML
RBC # BLD: 4.48 M/UL (ref 4.5–5.9)
RBC # BLD: ABNORMAL 10*6/UL
SEDIMENTATION RATE, ERYTHROCYTE: 26 MM (ref 0–15)
SEG NEUTROPHILS: 77 %
SEGMENTED NEUTROPHILS ABSOLUTE COUNT: 7.4 K/UL (ref 1.3–9.1)
SODIUM BLD-SCNC: 137 MMOL/L (ref 135–144)
TROPONIN INTERP: ABNORMAL
TROPONIN T: <0.03 NG/ML
WBC # BLD: 9.7 K/UL (ref 3.5–11)
WBC # BLD: ABNORMAL 10*3/UL

## 2017-11-02 PROCEDURE — 96374 THER/PROPH/DIAG INJ IV PUSH: CPT

## 2017-11-02 PROCEDURE — 94664 DEMO&/EVAL PT USE INHALER: CPT

## 2017-11-02 PROCEDURE — 2060000000 HC ICU INTERMEDIATE R&B

## 2017-11-02 PROCEDURE — 94640 AIRWAY INHALATION TREATMENT: CPT

## 2017-11-02 PROCEDURE — 6370000000 HC RX 637 (ALT 250 FOR IP): Performed by: EMERGENCY MEDICINE

## 2017-11-02 PROCEDURE — 6360000004 HC RX CONTRAST MEDICATION: Performed by: EMERGENCY MEDICINE

## 2017-11-02 PROCEDURE — 80048 BASIC METABOLIC PNL TOTAL CA: CPT

## 2017-11-02 PROCEDURE — 84484 ASSAY OF TROPONIN QUANT: CPT

## 2017-11-02 PROCEDURE — 71020 XR CHEST STANDARD TWO VW: CPT

## 2017-11-02 PROCEDURE — 99223 1ST HOSP IP/OBS HIGH 75: CPT | Performed by: INTERNAL MEDICINE

## 2017-11-02 PROCEDURE — 2580000003 HC RX 258: Performed by: HOSPITALIST

## 2017-11-02 PROCEDURE — 71260 CT THORAX DX C+: CPT

## 2017-11-02 PROCEDURE — 82947 ASSAY GLUCOSE BLOOD QUANT: CPT

## 2017-11-02 PROCEDURE — 6370000000 HC RX 637 (ALT 250 FOR IP): Performed by: RADIOLOGY

## 2017-11-02 PROCEDURE — 83874 ASSAY OF MYOGLOBIN: CPT

## 2017-11-02 PROCEDURE — 99285 EMERGENCY DEPT VISIT HI MDM: CPT

## 2017-11-02 PROCEDURE — 2580000003 HC RX 258: Performed by: EMERGENCY MEDICINE

## 2017-11-02 PROCEDURE — 85651 RBC SED RATE NONAUTOMATED: CPT

## 2017-11-02 PROCEDURE — 84145 PROCALCITONIN (PCT): CPT

## 2017-11-02 PROCEDURE — 6360000002 HC RX W HCPCS: Performed by: HOSPITALIST

## 2017-11-02 PROCEDURE — 36415 COLL VENOUS BLD VENIPUNCTURE: CPT

## 2017-11-02 PROCEDURE — 85025 COMPLETE CBC W/AUTO DIFF WBC: CPT

## 2017-11-02 PROCEDURE — 6360000002 HC RX W HCPCS: Performed by: NURSE PRACTITIONER

## 2017-11-02 PROCEDURE — 86140 C-REACTIVE PROTEIN: CPT

## 2017-11-02 PROCEDURE — 94762 N-INVAS EAR/PLS OXIMTRY CONT: CPT

## 2017-11-02 PROCEDURE — 6370000000 HC RX 637 (ALT 250 FOR IP): Performed by: HOSPITALIST

## 2017-11-02 PROCEDURE — 83880 ASSAY OF NATRIURETIC PEPTIDE: CPT

## 2017-11-02 PROCEDURE — 87040 BLOOD CULTURE FOR BACTERIA: CPT

## 2017-11-02 PROCEDURE — 93005 ELECTROCARDIOGRAM TRACING: CPT

## 2017-11-02 PROCEDURE — 83605 ASSAY OF LACTIC ACID: CPT

## 2017-11-02 PROCEDURE — 94660 CPAP INITIATION&MGMT: CPT

## 2017-11-02 PROCEDURE — 2580000003 HC RX 258: Performed by: NURSE PRACTITIONER

## 2017-11-02 RX ORDER — DEXTROSE MONOHYDRATE 50 MG/ML
100 INJECTION, SOLUTION INTRAVENOUS PRN
Status: DISCONTINUED | OUTPATIENT
Start: 2017-11-02 | End: 2017-11-04 | Stop reason: HOSPADM

## 2017-11-02 RX ORDER — MAGNESIUM SULFATE 1 G/100ML
1 INJECTION INTRAVENOUS PRN
Status: DISCONTINUED | OUTPATIENT
Start: 2017-11-02 | End: 2017-11-04 | Stop reason: HOSPADM

## 2017-11-02 RX ORDER — DIPHENHYDRAMINE HCL 25 MG
25 TABLET ORAL 2 TIMES DAILY
Status: DISCONTINUED | OUTPATIENT
Start: 2017-11-02 | End: 2017-11-03

## 2017-11-02 RX ORDER — DEXTROSE MONOHYDRATE 25 G/50ML
12.5 INJECTION, SOLUTION INTRAVENOUS PRN
Status: DISCONTINUED | OUTPATIENT
Start: 2017-11-02 | End: 2017-11-04 | Stop reason: HOSPADM

## 2017-11-02 RX ORDER — ONDANSETRON 2 MG/ML
4 INJECTION INTRAMUSCULAR; INTRAVENOUS ONCE
Status: COMPLETED | OUTPATIENT
Start: 2017-11-02 | End: 2017-11-02

## 2017-11-02 RX ORDER — ALBUTEROL SULFATE 90 UG/1
1 AEROSOL, METERED RESPIRATORY (INHALATION) EVERY 4 HOURS PRN
Status: DISCONTINUED | OUTPATIENT
Start: 2017-11-02 | End: 2017-11-04 | Stop reason: HOSPADM

## 2017-11-02 RX ORDER — BISACODYL 10 MG
10 SUPPOSITORY, RECTAL RECTAL DAILY PRN
Status: DISCONTINUED | OUTPATIENT
Start: 2017-11-02 | End: 2017-11-04 | Stop reason: HOSPADM

## 2017-11-02 RX ORDER — OXYCODONE HYDROCHLORIDE AND ACETAMINOPHEN 5; 325 MG/1; MG/1
1 TABLET ORAL ONCE
Status: COMPLETED | OUTPATIENT
Start: 2017-11-02 | End: 2017-11-02

## 2017-11-02 RX ORDER — PRAVASTATIN SODIUM 40 MG
40 TABLET ORAL EVERY EVENING
Status: DISCONTINUED | OUTPATIENT
Start: 2017-11-02 | End: 2017-11-04 | Stop reason: HOSPADM

## 2017-11-02 RX ORDER — ACETAMINOPHEN 325 MG/1
650 TABLET ORAL EVERY 4 HOURS PRN
Status: DISCONTINUED | OUTPATIENT
Start: 2017-11-02 | End: 2017-11-04 | Stop reason: HOSPADM

## 2017-11-02 RX ORDER — ONDANSETRON 2 MG/ML
4 INJECTION INTRAMUSCULAR; INTRAVENOUS EVERY 6 HOURS PRN
Status: DISCONTINUED | OUTPATIENT
Start: 2017-11-02 | End: 2017-11-04 | Stop reason: HOSPADM

## 2017-11-02 RX ORDER — CLOPIDOGREL BISULFATE 75 MG/1
75 TABLET ORAL DAILY
Status: DISCONTINUED | OUTPATIENT
Start: 2017-11-02 | End: 2017-11-04 | Stop reason: HOSPADM

## 2017-11-02 RX ORDER — ALBUTEROL SULFATE 2.5 MG/3ML
2.5 SOLUTION RESPIRATORY (INHALATION) EVERY 6 HOURS PRN
Status: DISCONTINUED | OUTPATIENT
Start: 2017-11-02 | End: 2017-11-04 | Stop reason: HOSPADM

## 2017-11-02 RX ORDER — 0.9 % SODIUM CHLORIDE 0.9 %
100 INTRAVENOUS SOLUTION INTRAVENOUS ONCE
Status: COMPLETED | OUTPATIENT
Start: 2017-11-02 | End: 2017-11-02

## 2017-11-02 RX ORDER — FLUTICASONE PROPIONATE 50 MCG
2 SPRAY, SUSPENSION (ML) NASAL DAILY
Status: DISCONTINUED | OUTPATIENT
Start: 2017-11-02 | End: 2017-11-04 | Stop reason: HOSPADM

## 2017-11-02 RX ORDER — VENLAFAXINE 75 MG/1
75 TABLET ORAL 2 TIMES DAILY WITH MEALS
Status: DISCONTINUED | OUTPATIENT
Start: 2017-11-02 | End: 2017-11-04 | Stop reason: HOSPADM

## 2017-11-02 RX ORDER — SODIUM CHLORIDE 0.9 % (FLUSH) 0.9 %
10 SYRINGE (ML) INJECTION PRN
Status: DISCONTINUED | OUTPATIENT
Start: 2017-11-02 | End: 2017-11-04 | Stop reason: HOSPADM

## 2017-11-02 RX ORDER — POTASSIUM CHLORIDE 20 MEQ/1
40 TABLET, EXTENDED RELEASE ORAL PRN
Status: DISCONTINUED | OUTPATIENT
Start: 2017-11-02 | End: 2017-11-04 | Stop reason: HOSPADM

## 2017-11-02 RX ORDER — SODIUM CHLORIDE 0.9 % (FLUSH) 0.9 %
10 SYRINGE (ML) INJECTION EVERY 12 HOURS SCHEDULED
Status: DISCONTINUED | OUTPATIENT
Start: 2017-11-02 | End: 2017-11-04 | Stop reason: HOSPADM

## 2017-11-02 RX ORDER — IPRATROPIUM BROMIDE AND ALBUTEROL SULFATE 2.5; .5 MG/3ML; MG/3ML
1 SOLUTION RESPIRATORY (INHALATION) EVERY 30 MIN PRN
Status: DISCONTINUED | OUTPATIENT
Start: 2017-11-02 | End: 2017-11-02

## 2017-11-02 RX ORDER — POTASSIUM CHLORIDE 20MEQ/15ML
40 LIQUID (ML) ORAL PRN
Status: DISCONTINUED | OUTPATIENT
Start: 2017-11-02 | End: 2017-11-04 | Stop reason: HOSPADM

## 2017-11-02 RX ORDER — NICOTINE POLACRILEX 4 MG
15 LOZENGE BUCCAL PRN
Status: DISCONTINUED | OUTPATIENT
Start: 2017-11-02 | End: 2017-11-04 | Stop reason: HOSPADM

## 2017-11-02 RX ORDER — POTASSIUM CHLORIDE 7.45 MG/ML
10 INJECTION INTRAVENOUS PRN
Status: DISCONTINUED | OUTPATIENT
Start: 2017-11-02 | End: 2017-11-04 | Stop reason: HOSPADM

## 2017-11-02 RX ORDER — SODIUM CHLORIDE 9 MG/ML
INJECTION, SOLUTION INTRAVENOUS CONTINUOUS
Status: DISCONTINUED | OUTPATIENT
Start: 2017-11-02 | End: 2017-11-04 | Stop reason: HOSPADM

## 2017-11-02 RX ORDER — NICOTINE 21 MG/24HR
1 PATCH, TRANSDERMAL 24 HOURS TRANSDERMAL DAILY PRN
Status: DISCONTINUED | OUTPATIENT
Start: 2017-11-02 | End: 2017-11-04 | Stop reason: HOSPADM

## 2017-11-02 RX ORDER — 0.9 % SODIUM CHLORIDE 0.9 %
1000 INTRAVENOUS SOLUTION INTRAVENOUS ONCE
Status: COMPLETED | OUTPATIENT
Start: 2017-11-02 | End: 2017-11-02

## 2017-11-02 RX ORDER — ASPIRIN 81 MG/1
81 TABLET ORAL DAILY
Status: DISCONTINUED | OUTPATIENT
Start: 2017-11-02 | End: 2017-11-04 | Stop reason: HOSPADM

## 2017-11-02 RX ORDER — OXYCODONE HYDROCHLORIDE AND ACETAMINOPHEN 5; 325 MG/1; MG/1
1 TABLET ORAL EVERY 6 HOURS PRN
Status: DISCONTINUED | OUTPATIENT
Start: 2017-11-02 | End: 2017-11-04 | Stop reason: HOSPADM

## 2017-11-02 RX ADMIN — IPRATROPIUM BROMIDE AND ALBUTEROL SULFATE 1 AMPULE: .5; 3 SOLUTION RESPIRATORY (INHALATION) at 15:20

## 2017-11-02 RX ADMIN — DIPHENHYDRAMINE HCL 25 MG: 25 TABLET ORAL at 21:15

## 2017-11-02 RX ADMIN — SODIUM CHLORIDE 100 ML: 9 INJECTION, SOLUTION INTRAVENOUS at 15:03

## 2017-11-02 RX ADMIN — IOPAMIDOL 100 ML: 755 INJECTION, SOLUTION INTRAVENOUS at 15:02

## 2017-11-02 RX ADMIN — ONDANSETRON 4 MG: 2 INJECTION, SOLUTION INTRAMUSCULAR; INTRAVENOUS at 13:44

## 2017-11-02 RX ADMIN — Medication 10 ML: at 15:02

## 2017-11-02 RX ADMIN — PIPERACILLIN SODIUM,TAZOBACTAM SODIUM 3.38 G: 3; .375 INJECTION, POWDER, FOR SOLUTION INTRAVENOUS at 22:15

## 2017-11-02 RX ADMIN — INSULIN LISPRO 2 UNITS: 100 INJECTION, SOLUTION INTRAVENOUS; SUBCUTANEOUS at 21:30

## 2017-11-02 RX ADMIN — PRAVASTATIN SODIUM 40 MG: 40 TABLET ORAL at 21:15

## 2017-11-02 RX ADMIN — VENLAFAXINE 75 MG: 75 TABLET ORAL at 21:15

## 2017-11-02 RX ADMIN — VANCOMYCIN HYDROCHLORIDE 1500 MG: 10 INJECTION, POWDER, LYOPHILIZED, FOR SOLUTION INTRAVENOUS at 15:58

## 2017-11-02 RX ADMIN — ASPIRIN 81 MG: 81 TABLET, COATED ORAL at 18:22

## 2017-11-02 RX ADMIN — SODIUM CHLORIDE: 9 INJECTION, SOLUTION INTRAVENOUS at 18:03

## 2017-11-02 RX ADMIN — OXYCODONE HYDROCHLORIDE AND ACETAMINOPHEN 1 TABLET: 5; 325 TABLET ORAL at 18:21

## 2017-11-02 RX ADMIN — CLOPIDOGREL BISULFATE 75 MG: 75 TABLET ORAL at 18:21

## 2017-11-02 RX ADMIN — Medication 2 PUFF: at 21:15

## 2017-11-02 RX ADMIN — TIOTROPIUM BROMIDE 18 MCG: 18 CAPSULE ORAL; RESPIRATORY (INHALATION) at 21:15

## 2017-11-02 RX ADMIN — SODIUM CHLORIDE 1000 ML: 9 INJECTION, SOLUTION INTRAVENOUS at 13:55

## 2017-11-02 RX ADMIN — FLUTICASONE PROPIONATE 2 SPRAY: 50 SPRAY, METERED NASAL at 21:15

## 2017-11-02 RX ADMIN — OXYCODONE HYDROCHLORIDE AND ACETAMINOPHEN 1 TABLET: 5; 325 TABLET ORAL at 16:07

## 2017-11-02 ASSESSMENT — ENCOUNTER SYMPTOMS
ABDOMINAL PAIN: 0
TROUBLE SWALLOWING: 0
NAUSEA: 1
VOMITING: 0
DIARRHEA: 0
NAUSEA: 0
COLOR CHANGE: 1
COUGH: 0
VOMITING: 1
CONSTIPATION: 0
WHEEZING: 1
SHORTNESS OF BREATH: 1

## 2017-11-02 ASSESSMENT — PAIN SCALES - GENERAL
PAINLEVEL_OUTOF10: 8
PAINLEVEL_OUTOF10: 8
PAINLEVEL_OUTOF10: 3
PAINLEVEL_OUTOF10: 8

## 2017-11-02 ASSESSMENT — PAIN DESCRIPTION - ORIENTATION
ORIENTATION: RIGHT;LEFT
ORIENTATION: RIGHT;LEFT

## 2017-11-02 ASSESSMENT — PAIN DESCRIPTION - LOCATION
LOCATION: LEG
LOCATION: LEG

## 2017-11-02 NOTE — H&P
extremities 5/25/2017    CHF (congestive heart failure), NYHA class III ([facility]) 8/14/2013    Chronic back pain     Chronic headaches     was referred to neuro, testing scheduled    COPD (chronic obstructive pulmonary disease) ([address]     Diabetic neuropathy ([address] 8/14/2013    Displacement of lumbar intervertebral disc without myelopathy 6/13/2013    Ear infection     RIGHT    Facial cellulitis 2012    Fall 3/25/2017    GERD (gastroesophageal reflux disease)     Head injury     Hearing loss in right ear     pencil pierced ear as a child    Hepatic steatosis 12/3/2015    History of rib fracture 12/3/2015    Chronic     Hyperlipidemia     Hypertension     Insomnia     Mastoiditis of left side     Mixed conductive and sensorineural hearing loss of both ears 1/10/2017    Per ENT    Morbid obesity with BMI of 45.0-49.9, adult ([address] 6/16/2015    Obesity     BARBY on CPAP     Otitis externa of left ear     Pancreatitis chronic     Renal insufficiency     proteinuria    Severe depression ([address] 9/25/2013    Syncope 4/28/2017    Tinnitus of both ears 1/10/2017    Per ENT    Type 2 diabetes mellitus with stage 3 chronic kidney disease, with long-term current use of insulin ([address] 12/26/2016    Type II or unspecified type diabetes mellitus without mention of complication, not stated as uncontrolled     uncontrolled        Past Surgical History:     Past Surgical History:   Procedure Laterality Date    BACK SURGERY   (x 4) 2000,.12/2011.2/2012     Dr Shelbie Lewis last 2 surg    COLONOSCOPY  11/3/2015    hemorrhoids, poor prep    CORONARY ANGIOPLASTY WITH STENT PLACEMENT  March 2013    x 1    HAND TENDON SURGERY Left     thumb tendon repair    KNEE ARTHROSCOPY Left     NERVE BLOCK  07-31-15    TENS unit    TYMPANOMASTOIDECTOMY  09/20/12    Dr Iverson Starch  4/13/2013    normal        Medications Prior to Admission:     Prior to Admission medications    Medication Sig Start Date End Date Taking? Authorizing Provider   metolazone (ZAROXOLYN) 2.5 MG tablet Take 1 tablet by mouth once a week On tuesday 11/1/17   [facility], MD   lisinopril (PRINIVIL;ZESTRIL) 5 MG tablet Take 1 tablet by mouth daily . Discontinued Lisinopril  10 mg 11/1/17   Justin Seymour MD   venlafaxine (EFFEXOR) 75 MG tablet TAKE ONE TABLET BY MOUTH TWICE A DAY *CALL FOR REFILL OR DOSE INCREASE IF TOLERATED. STOP TAKING PROZAC* 10/25/17   Justin Seymour MD   oxyCODONE HCl (OXY-IR) 10 MG immediate release tablet Take 1 tablet by mouth every 8 hours as needed for Pain . 10/18/17   Justin Seymour MD   CIPRODEX 0.3-0.1 % otic suspension  9/1/17   Historical Provider, MD   SITagliptin (JANUVIA) 25 MG tablet Take 1 tablet by mouth daily 9/14/17   Justin Seymour MD   ammonium lactate (LAC-HYDRIN) 12 % lotion Apply topically daily on the legs.  9/8/17   Paige Bryant MD   PROAIR  (90 Base) MCG/ACT inhaler INHALE TWO PUFFS BY MOUTH EVERY 6 HOURS AS NEEDED FOR WHEEZING OR FOR SHORTNESS OF BREATH 8/21/17   Paige Bryant MD   tiZANidine (ZANAFLEX) 4 MG tablet TAKE ONE TABLET BY MOUTH EVERY 8 HOURS AS NEEDED FOR BACK PAIN 8/15/17   Justin Seymour MD   miconazole (MICOTIN) 2 % powder Apply topically 2 times daily axillary for rash 7/21/17   Justin Seymour MD   docusate sodium (COLACE) 100 MG capsule Take 1 capsule by mouth 2 times daily as needed for Constipation 7/20/17   Justin Seymour MD   metoprolol tartrate (LOPRESSOR) 50 MG tablet Take 1 tablet by mouth 2 times daily 7/12/17   Bret Stern MD   bumetanide (BUMEX) 1 MG tablet Take 3 tablets by mouth 2 times daily  Patient taking differently: Take 3 mg by mouth 2 times daily 3 mg am 2 mg pm 7/7/17   [facility], MD   mupirocin [facility]) 2 % ointment Apply topically 3 times daily on the affected area 7/5/17   Justin Seymour MD   SPIRIVA HANDIHALER 18 MCG inhalation capsule INHALE THE ENTIRE CONTENTS OF 1 CAPSULE ONCE A DAY USING HANDIHALER DEVICE 6/19/17   Roberta Tong MD   ADVAIR -21 MCG/ACT inhaler INHALE TWO PUFFS BY MOUTH TWICE A DAY 6/15/17   Roberta Tong MD   ONE TOUCH ULTRASOFT LANCETS MISC USE ONE LANCET TO TEST THREE TIMES A DAY 6/15/17   Roberta Tong MD   pantoprazole (PROTONIX) 40 MG tablet Take 1 tablet by mouth nightly 6/9/17   Roberta Tong MD   pravastatin (PRAVACHOL) 40 MG tablet Take 1 tablet by mouth every evening STOP ATORVASTATIN 5/25/17   Roberta Tong MD   fluticasone (CUTIVATE) 0.05 % cream  4/19/17   Historical Provider, MD   lidocaine (LMX) 4 % cream Apply topically daily as needed.  3/23/17   Roberta Tong MD   spironolactone (ALDACTONE) 50 MG tablet Take 1 tablet by mouth daily 1/27/17   Roberta Tong MD   Ferrous Sulfate (IRON) 325 (65 FE) MG TABS Take 1 tablet by mouth daily 1/27/17   Roberta Tong MD   vitamin D (CHOLECALCIFEROL) 1000 UNIT TABS tablet Take 1 tablet by mouth daily 1/27/17   Roberta Tong MD   clopidogrel (PLAVIX) 75 MG tablet TAKE ONE TABLET BY MOUTH DAILY 1/16/17   Roberta Tong MD   fluticasone (FLONASE) 50 MCG/ACT nasal spray 2 sprays by Nasal route daily 1/16/17   Roberta Tong MD   Melatonin 10 MG TABS Take 10 mg by mouth nightly as needed (insomnia) 12/23/16   Roberta Tong MD   ONE TOUCH ULTRA TEST strip TWO TIMES A DAY 12/12/16   Roberta Tong MD   albuterol (PROVENTIL) (2.5 MG/3ML) 0.083% nebulizer solution Take 3 mLs by nebulization every 6 hours as needed for Wheezing or Shortness of Breath 11/2/16   Reinaldo Little MD   COMFORT ASSIST INSULIN SYRINGE 31G X 5/16\" 1 ML MISC  7/13/16   Historical Provider, MD   nitroGLYCERIN (NITROSTAT) 0.4 MG SL tablet Place 1 tablet under the tongue every 5 minutes as needed for Chest pain 7/6/16   Roberta Tong MD   insulin regular human 500 UNIT/ML injection Inject 30 Units into the skin 3 times daily (before meals) **U-500** draw back to 0.2 on a U-100 1mL syringe (= 100 units of U-500) at each dose     Historical Provider, MD   aspirin 81 MG tablet Take 81 mg by mouth daily. Historical Provider, MD        Allergies:     Gabapentin; Levaquin [levofloxacin]; Lorazepam; Nsaids; and Levofloxacin    Social History:     Tobacco:    reports that he quit smoking about 3 months ago. His smoking use included Cigarettes. He started smoking about 32 years ago. He has a 90.00 pack-year smoking history. He quit smokeless tobacco use about 22 years ago. His smokeless tobacco use included Snuff. Alcohol:      reports that he does not drink alcohol. Drug Use:  reports that he uses drugs, including Marijuana. Family History:     Family History   Problem Relation Age of Onset    Heart Disease Mother       age 64 from [facility] High Blood Pressure Mother     Diabetes Mother     High Blood Pressure Father       age 80 from CKD and Lung Fibrosis    Kidney Disease Father     Heart Disease Sister     Heart Attack Sister     Obesity Sister     Diabetes Sister        Review of Systems:     Positive and Negative as described in HPI. Review of Systems   Constitutional: Negative for chills and fever. Respiratory: Positive for shortness of breath (at baseline) and wheezing (at baseline). Negative for cough. Cardiovascular: Positive for leg swelling. Negative for chest pain. Gastrointestinal: Positive for nausea (resolving) and vomiting (resolving). Negative for abdominal pain, constipation and diarrhea. Genitourinary: Negative for dysuria. Musculoskeletal: Negative for myalgias. Skin: Negative for rash (b/l LE erythema, TTP). Neurological: Negative for dizziness and headaches.          Physical Exam:   BP (!) 111/41   Pulse 83   Temp 98.5 °F (36.9 °C) (Oral)   Resp 17   Ht 6' (1.829 m)   Wt (!) 374 lb (169.6 kg)   SpO2 97%   BMI 50.72 kg/m²   Temp (24hrs), Av.5 °F (36.9 °C), Min:98.4 °F (36.9 °C), Max:98.5 °F (36.9 °C)    No results for input(s): POCGLU in the last 72 hours. No intake or output data in the 24 hours ending 11/02/17 1552    Physical Exam   Constitutional: He is oriented to person, place, and time and well-developed, well-nourished, and in no distress. No distress (in discomfort, no acute distress). Cardiovascular: Normal rate, regular rhythm and normal heart sounds. Pulmonary/Chest: Effort normal. No respiratory distress. He has wheezes (diffuse b/l expiratory wheeze). He has no rales. Abdominal: Soft. Bowel sounds are normal. He exhibits no distension. There is no tenderness. There is no rebound and no guarding. Musculoskeletal: Normal range of motion. Neurological: He is alert and oriented to person, place, and time. Skin: Skin is warm. Lesion noted. There is erythema.         Psychiatric: Affect normal.         Investigations:      Laboratory Testing:  Recent Results (from the past 24 hour(s))   Basic Metabolic Panel    Collection Time: 11/02/17  1:16 PM   Result Value Ref Range    Glucose 211 (H) 70 - 99 mg/dL    BUN 28 (H) 6 - 20 mg/dL    CREATININE 1.43 (H) 0.70 - 1.20 mg/dL    Bun/Cre Ratio NOT REPORTED 9 - 20    Calcium 9.3 8.6 - 10.4 mg/dL    Sodium 137 135 - 144 mmol/L    Potassium 4.1 3.7 - 5.3 mmol/L    Chloride 96 (L) 98 - 107 mmol/L    CO2 26 20 - 31 mmol/L    Anion Gap 15 9 - 17 mmol/L    GFR Non-African American 52 (L) >60 mL/min    GFR African American >60 >60 mL/min    GFR Comment          GFR Staging NOT REPORTED    Brain Natriuretic Peptide    Collection Time: 11/02/17  1:16 PM   Result Value Ref Range    Pro-BNP 73 <300 pg/mL    BNP Interpretation         CBC Auto Differential    Collection Time: 11/02/17  1:16 PM   Result Value Ref Range    WBC 9.7 3.5 - 11.0 k/uL    RBC 4.48 (L) 4.5 - 5.9 m/uL    Hemoglobin 12.6 (L) 13.5 - 17.5 g/dL    Hematocrit 38.1 (L) 41 - 53 %    MCV 85.1 80 - 100 fL    MCH 28.2 26 - 34 pg    MCHC 33.1 31 - 37 g/dL    RDW 16.5 (H) 11.5 - with stage 3 chronic kidney disease, with long-term current use of insulin ([facility]) [E11.22, N18.3, Z79.4] 12/26/2016    Stasis dermatitis of both legs [I87.2] 08/21/2016    BARBY (obstructive sleep apnea) [G47.33] 06/24/2016    Bilateral lower leg cellulitis [L03.116, L03.115] 02/17/2016    CKD (chronic kidney disease) stage 3, GFR 30-59 ml/min [N18.3] 05/08/2013    COPD (chronic obstructive pulmonary disease) ([facility]) [J44.9]     Type 2 diabetes mellitus with diabetic neuropathy, with long-term current use of insulin ([facility] [address] [E11.40, Z79.4]        Plan:     Patient status Admit as inpatient in the  Progressive Unit/Step down    B/L LE cellulitis likely 2/2 chronic venous stasis/diabetes, poss sepsis  - Not currently meeting SIRS criteria, temp WNL, HR WNL, WBC WNL, intermittent tachypnea  - Hypotensive to 80/31  - Hx of prior cellulitis, chronic lower extremity edema  - Vancomycin, pharm to dose  - Zosyn  - Procalcitonin, lactic acid  - IVF s/p 1 L bolus NS; NS @ 75mL/hr    Acute episode hypotension, N/V, resolving  - Unclear cause, poss IgE mediated response  - CT PE neg  - Diphenhydramine prior to vancomycin administration    DM type 2, insulin dependent with CKD stage III  - reports humalin 30 units in morning, 35 units in afternoon, 15 units in evening at home  - Januvia home regimen  - SSI    BARBY  - On CPAP at home with 2L O2 at night  - Continue home CPAP    COPD  - Pt reports SOB at baseline  - Continue home regimen    Obesity  - BMI >67, complicates all aspects of care    Consultations:   PHARMACY TO DOSE VANCOMYCIN  PHARMACY TO DOSE VANCOMYCIN  IP CONSULT TO PRIMARY CARE PROVIDER  PHARMACY TO DOSE VANCOMYCIN    Patient is admitted as inpatient status because of co-morbidities listed above, severity of signs and symptoms as outlined, requirement for current medical therapies and most importantly because of direct risk to patient if care not provided in a hospital setting.     Halima Harkins,

## 2017-11-02 NOTE — ED NOTES
Pt returns from CT and feels a little better. Pts respirations are slightly labored at this time. Pt requesting pain medication and something to drink. Dr Nai Pastor notified.       Nicky Wilkinson RN  11/02/17 [id number]

## 2017-11-02 NOTE — FLOWSHEET NOTE
[facility]ly [facility], [facility]  Lymphedema Services    [] [address] [place] [address] Blvd. Occupational Therapy       [address], [address]       Phone: [phone]       Fax: [phone] [] [facility] Occupational  Therapy at [address] [place] [address]. [place], [place]       Phone: [phone]       Fax: [phone]          Occupational Therapy Cancel/No Show note    Date: 2017  Patient: Jovanni Llanos : 1965  MRN: [id number]  Cancels/No Shows to date: For today's appointment patient:  [x]  Cancelled  []  Rescheduled appointment  []  No-show     Reason given by patient:  []  Patient ill  []  Conflicting appointment  []  No transportation    []  Conflict with work  []  No reason given  [x]  Other:  Pt arrived for appointment with new wound to RLE calf approx 3x3cm.   RN consulted, recommendation made for pt to see physician or ER d/t concerns for possible cellulitis   Comments:  Pt to follow up    Electronically signed by: DEREK Vogt/CRISTINA

## 2017-11-02 NOTE — PROGRESS NOTES
Pharmacy Note  Vancomycin Consult    Miguel Chip Frazierurieltheron. is a 45 y.o. male started on Vancomycin for Cellulitis; consult received from Dr. Alaina Ascencio to manage therapy. Also receiving the following antibiotics: Zosyn.     Patient Active Problem List   Diagnosis    DDD (degenerative disc disease)    Hypertriglyceridemia    CKD (chronic kidney disease) stage 3, GFR 30-59 ml/min    CAD (coronary artery disease) s/p 1 stent    COPD (chronic obstructive pulmonary disease) (HCC)    Type 2 diabetes mellitus with diabetic neuropathy, with long-term current use of insulin (HCC)    Chronic pancreatitis (HCC)    Displacement of lumbar intervertebral disc without myelopathy    Obesity, morbid (more than 100 lbs over ideal weight or BMI > 40) ([facility])    Chronic diastolic congestive heart failure (HCC)    Peripheral neuropathy (HCC)    Insomnia    BPH (benign prostatic hyperplasia)    Smoker    Class 3 obesity due to excess calories with body mass index (BMI) of 50.0 to 59.9 in adult [facility])    Essential hypertension    Hepatic steatosis    History of rib fracture    Umbilical hernia    Left inguinal hernia    Adrenal gland anomaly    Lumbar disc narrowing    Stenosis, spinal, lumbar    Chronic back pain greater than 3 months duration    Gastroesophageal reflux disease without esophagitis    Hyperlipidemia with target LDL less than 70    Bilateral lower leg cellulitis    Lower urinary tract symptoms (LUTS)    Vitamin D deficiency    Iron deficiency anemia due to chronic blood loss    BARBY (obstructive sleep apnea)    Chronic midline low back pain with left-sided sciatica    Stasis dermatitis of both legs    Anxiety    Positive depression screening    Moderate recurrent major depression (HCC)    History of recurrent ear infection    Unintended weight gain    Anemia    Type 2 diabetes mellitus with stage 3 chronic kidney disease, with long-term current use of insulin (HCC)    Mixed conductive and sensorineural hearing loss of both ears    Tinnitus of both ears    Adhesive capsulitis of left shoulder    Slow transit constipation    Chronic otitis externa of right ear    Bilateral leg edema    Tinea pedis of both feet    Onychomycosis    MDD (major depressive disorder), recurrent severe, without psychosis ([facility]    BARBY on CPAP    Intolerance of continuous positive airway pressure (CPAP) ventilation    Dry skin dermatitis legs       Allergies:  Gabapentin; Levaquin [levofloxacin]; Lorazepam; Nsaids; and Levofloxacin     Temp max: 36.9C    Recent Labs      11/02/17   1316   BUN  28*       Recent Labs      11/02/17   1316   CREATININE  1.43*       Recent Labs      11/02/17   1316   WBC  9.7       No intake or output data in the 24 hours ending 11/02/17 1734    Culture Date      Source                       Results  none    Ht Readings from Last 1 Encounters:   11/02/17 6' (1.829 m)        Wt Readings from Last 1 Encounters:   11/02/17 (!) 374 lb (169.6 kg)         Body mass index is 50.72 kg/m². Estimated Creatinine Clearance: 98 mL/min (based on SCr of 1.43 mg/dL). ** Calculated Creatinine clearance using ideal body weight- 66.32 mL/min      Assessment/Plan:  Will initiate vancomycin 1500 mg IV every 12 hours. Timing of trough level will be determined based on culture results, renal function, and clinical response. Thank you for the consult. Will continue to follow.    Kirsten Su, Rebekah D, [facility]  Jasvir Smith 34  11/2/2017 5:36 PM

## 2017-11-02 NOTE — ED PROVIDER NOTES
[address]  eMERGENCY dEPARTMENT eNCOUnter      Pt Name: Branden Centeno MRN: [id number]  Birthdate 1965  Date of evaluation: 11/2/2017  Provider: Brittney [id number]       Chief Complaint   Patient presents with    Other     sores to both legs         HISTORY OF PRESENT ILLNESS  (Location/Symptom, Timing/Onset, Context/Setting, Quality, Duration, Modifying Factors, Severity.)   Branden Centeno is a 45 y.o. male who presents to the emergency department with complaints of bilateral lower leg redness and swelling, right worse than left. Patient states that he just finished course of Augmentin but symptoms are getting worse. No having more swelling and redness and wounds to anterior legs. States there is some weeping and drainage from the wound so he has been keeping them covered. Patient reports history of similar bilateral lower leg cellulitis in the past and had IV vancomycin/ was admitted to hospital.  Patient states that it feels \"like he is walking on hot coals\". Reports bilateral calf tightness, had the Doppler recently which showed no DVT. Patient is diabetic and states that sugars have been running higher than normal.  Denies fever or chills. Denies nausea or vomiting. Reports shortness of breath but states it is unchanged from prior/has COPD. Patient was told by his family doctor to come to ED for evaluation and possible admission for IV antibiotics. Nursing Notes were reviewed and I agree. REVIEW OF SYSTEMS    (2-9 systems for level 4, 10 or more for level 5)     Review of Systems   Constitutional: Negative for chills and fever. HENT: Negative for trouble swallowing. Respiratory: Positive for shortness of breath (unchanged from chronic). Negative for cough. Cardiovascular: Positive for leg swelling. Negative for chest pain and palpitations. Gastrointestinal: Negative for nausea and vomiting.    Musculoskeletal:        Bilateral leg pain and (LOPRESSOR) 50 MG TABLET    Take 1 tablet by mouth 2 times daily    MICONAZOLE (MICOTIN) 2 % POWDER    Apply topically 2 times daily axillary for rash    MUPIROCIN (BACTROBAN) 2 % OINTMENT    Apply topically 3 times daily on the affected area    NITROGLYCERIN (NITROSTAT) 0.4 MG SL TABLET    Place 1 tablet under the tongue every 5 minutes as needed for Chest pain    ONE TOUCH ULTRA TEST STRIP    TWO TIMES A DAY    ONE TOUCH ULTRASOFT LANCETS MISC    USE ONE LANCET TO TEST THREE TIMES A DAY    OXYCODONE HCL (OXY-IR) 10 MG IMMEDIATE RELEASE TABLET    Take 1 tablet by mouth every 8 hours as needed for Pain . PANTOPRAZOLE (PROTONIX) 40 MG TABLET    Take 1 tablet by mouth nightly    PRAVASTATIN (PRAVACHOL) 40 MG TABLET    Take 1 tablet by mouth every evening STOP ATORVASTATIN    PROAIR  (90 BASE) MCG/ACT INHALER    INHALE TWO PUFFS BY MOUTH EVERY 6 HOURS AS NEEDED FOR WHEEZING OR FOR SHORTNESS OF BREATH    SITAGLIPTIN (JANUVIA) 25 MG TABLET    Take 1 tablet by mouth daily    SPIRIVA HANDIHALER 18 MCG INHALATION CAPSULE    INHALE THE ENTIRE CONTENTS OF 1 CAPSULE ONCE A DAY USING HANDIHALER DEVICE    SPIRONOLACTONE (ALDACTONE) 50 MG TABLET    Take 1 tablet by mouth daily    TIZANIDINE (ZANAFLEX) 4 MG TABLET    TAKE ONE TABLET BY MOUTH EVERY 8 HOURS AS NEEDED FOR BACK PAIN    VENLAFAXINE (EFFEXOR) 75 MG TABLET    TAKE ONE TABLET BY MOUTH TWICE A DAY *CALL FOR REFILL OR DOSE INCREASE IF TOLERATED. STOP TAKING PROZAC*    VITAMIN D (CHOLECALCIFEROL) 1000 UNIT TABS TABLET    Take 1 tablet by mouth daily       ALLERGIES     Gabapentin; Levaquin [levofloxacin];  Lorazepam; Nsaids; and Levofloxacin    FAMILY HISTORY           Problem Relation Age of Onset    Heart Disease Mother       age 64 from MI   Sumit Portillo High Blood Pressure Mother     Diabetes Mother     High Blood Pressure Father       age 80 from CKD and Lung Fibrosis    Kidney Disease Father     Heart Disease Sister     Heart Attack Sister    Sumit Portillo Obesity Sister     Diabetes Sister      Family Status   Relation Status    Mother     Father     Sister       Reviewed and not relevant. SOCIAL HISTORY      reports that he quit smoking about 3 months ago. His smoking use included Cigarettes. He started smoking about 32 years ago. He has a 90.00 pack-year smoking history. He quit smokeless tobacco use about 22 years ago. His smokeless tobacco use included Snuff. He reports that he uses drugs, including Marijuana. He reports that he does not drink alcohol. Reviewed. PHYSICAL EXAM    (up to 7 for level 4, 8 or more for level 5)     ED Triage Vitals   BP Temp Temp src Pulse Resp SpO2 Height Weight   -- -- -- -- -- -- -- --       Physical Exam   Constitutional: He is oriented to person, place, and time. He appears well-developed and well-nourished. HENT:   Head: Normocephalic and atraumatic. Right Ear: External ear normal.   Left Ear: External ear normal.   Nose: Nose normal.   Eyes: Right eye exhibits no discharge. Left eye exhibits no discharge. No scleral icterus. Neck: Normal range of motion. Cardiovascular: Normal rate, regular rhythm and normal heart sounds. Pulmonary/Chest: Effort normal and breath sounds normal. No stridor. No respiratory distress. He has no wheezes. He has no rales. Abdominal: Soft. Bowel sounds are normal.   Musculoskeletal: Normal range of motion. He exhibits no edema. Bilateral lower extremity edema and erythema with weeping. Wounds to anterior shins with drainage. Tender. Bilateral calf tenderness and tightness. Neurological: He is alert and oriented to person, place, and time. Coordination normal.   Skin: Skin is warm and dry. He is not diaphoretic. Psychiatric: He has a normal mood and affect.  His behavior is normal.       DIAGNOSTIC RESULTS     RADIOLOGY:   [] Visualized by me  And Lelia Domingo DO   Xr Chest Standard (2 Vw)    Result Date: 11/2/2017  EXAMINATION: TWO VIEWS OF THE CHEST 11/2/2017 1:06

## 2017-11-02 NOTE — ED PROVIDER NOTES
[address]  eMERGENCY dEPARTMENT eNCOUnter   Attending Attestation     Pt Name: Bing Morales. MRN: [id number]  Birthdate 1965  Date of evaluation: 11/2/17   Ham Sterling. is a 45 y.o. male with CC: Other (sores to both legs)      INITIAL VITALS: /68   Pulse 89   Temp 98.4 °F (36.9 °C) (Oral)   Resp 16   Ht 6' (1.829 m)   Wt (!) 374 lb (169.6 kg)   SpO2 95%   BMI 50.72 kg/m²     MDM: Swelling, erythema and pain to bilateral lower extremity. Was treated as an outpatient for cellulitis. Erythema has worsened. He is afebrile and nontoxic in appearance. Vital signs within normal limits. Legs are swollen and tender and erythematous. Also with an open wound to his anterior right lower leg. Plan is for admission for IV antibiotics for outpatient treatment failure for cellulitis. Patient has been admitted multiple times for the same issues. I personally evaluated and examined the patient in conjunction with the APC and agree with the assessment, treatment plan, and disposition of the patient as recorded by the APC.      Sherwin Tucker DO  Attending Emergency Physician        Sherwin Tucker DO  11/02/17 8903

## 2017-11-02 NOTE — PROGRESS NOTES
constipation    Chronic otitis externa of right ear    Bilateral leg edema    Tinea pedis of both feet    Onychomycosis    MDD (major depressive disorder), recurrent severe, without psychosis ([facility] [address]    BARBY on CPAP    Intolerance of continuous positive airway pressure (CPAP) ventilation    Dry skin dermatitis legs       Allergies:  Gabapentin; Levaquin [levofloxacin]; Lorazepam; Nsaids; and Levofloxacin     Temp max: 36.9    Culture Date      Source                       Results  None    Ht Readings from Last 1 Encounters:   11/02/17 6' (1.829 m)        Wt Readings from Last 1 Encounters:   11/02/17 (!) 374 lb (169.6 kg)         Body mass index is 50.72 kg/m². CrCl cannot be calculated (Patient's most recent lab result is older than the maximum 10 days allowed. ). Assessment/Plan:  Will give vancomycin 1500 mg IV once based on morbid obesity protocol. Will wait for serum creatinine and labs to result to determine futher dosing. Timing of trough level will be determined based on culture results, renal function, and clinical response. Thank you for the consult. Will continue to follow.    Marcus Oreilly, Pharm D, [address]  11/2/2017 12:54 PM

## 2017-11-02 NOTE — ED NOTES
Pt c /o severe nausea, vomited 200 mls of stomach content before this rn could give zofran. Pt c/o bilat shoulder pain and feeling hot and not feeling good.      Tory Mak RN  11/02/17 0551

## 2017-11-03 LAB
ANION GAP SERPL CALCULATED.3IONS-SCNC: 10 MMOL/L (ref 9–17)
BUN BLDV-MCNC: 30 MG/DL (ref 6–20)
BUN/CREAT BLD: ABNORMAL (ref 9–20)
CALCIUM SERPL-MCNC: 8.7 MG/DL (ref 8.6–10.4)
CHLORIDE BLD-SCNC: 99 MMOL/L (ref 98–107)
CO2: 27 MMOL/L (ref 20–31)
CREAT SERPL-MCNC: 1.56 MG/DL (ref 0.7–1.2)
GFR AFRICAN AMERICAN: 57 ML/MIN
GFR NON-AFRICAN AMERICAN: 47 ML/MIN
GFR SERPL CREATININE-BSD FRML MDRD: ABNORMAL ML/MIN/{1.73_M2}
GFR SERPL CREATININE-BSD FRML MDRD: ABNORMAL ML/MIN/{1.73_M2}
GLUCOSE BLD-MCNC: 239 MG/DL (ref 75–110)
GLUCOSE BLD-MCNC: 307 MG/DL (ref 70–99)
GLUCOSE BLD-MCNC: 308 MG/DL (ref 75–110)
GLUCOSE BLD-MCNC: 398 MG/DL (ref 75–110)
GLUCOSE BLD-MCNC: 411 MG/DL (ref 75–110)
POTASSIUM SERPL-SCNC: 4.8 MMOL/L (ref 3.7–5.3)
SODIUM BLD-SCNC: 136 MMOL/L (ref 135–144)
TOTAL CK: 201 U/L (ref 39–308)

## 2017-11-03 PROCEDURE — 94660 CPAP INITIATION&MGMT: CPT

## 2017-11-03 PROCEDURE — G8997 SWALLOW GOAL STATUS: HCPCS

## 2017-11-03 PROCEDURE — 6360000002 HC RX W HCPCS: Performed by: HOSPITALIST

## 2017-11-03 PROCEDURE — 82550 ASSAY OF CK (CPK): CPT

## 2017-11-03 PROCEDURE — 6370000000 HC RX 637 (ALT 250 FOR IP): Performed by: RADIOLOGY

## 2017-11-03 PROCEDURE — 6360000002 HC RX W HCPCS: Performed by: RADIOLOGY

## 2017-11-03 PROCEDURE — 2580000003 HC RX 258: Performed by: HOSPITALIST

## 2017-11-03 PROCEDURE — 6370000000 HC RX 637 (ALT 250 FOR IP): Performed by: HOSPITALIST

## 2017-11-03 PROCEDURE — 94761 N-INVAS EAR/PLS OXIMETRY MLT: CPT

## 2017-11-03 PROCEDURE — 2060000000 HC ICU INTERMEDIATE R&B

## 2017-11-03 PROCEDURE — 36415 COLL VENOUS BLD VENIPUNCTURE: CPT

## 2017-11-03 PROCEDURE — 80048 BASIC METABOLIC PNL TOTAL CA: CPT

## 2017-11-03 PROCEDURE — 82947 ASSAY GLUCOSE BLOOD QUANT: CPT

## 2017-11-03 PROCEDURE — G8996 SWALLOW CURRENT STATUS: HCPCS

## 2017-11-03 RX ORDER — METHYLPREDNISOLONE SODIUM SUCCINATE 40 MG/ML
40 INJECTION, POWDER, LYOPHILIZED, FOR SOLUTION INTRAMUSCULAR; INTRAVENOUS ONCE
Status: COMPLETED | OUTPATIENT
Start: 2017-11-03 | End: 2017-11-03

## 2017-11-03 RX ORDER — FAMOTIDINE 20 MG/1
20 TABLET, FILM COATED ORAL ONCE
Status: COMPLETED | OUTPATIENT
Start: 2017-11-03 | End: 2017-11-03

## 2017-11-03 RX ORDER — INSULIN GLARGINE 100 [IU]/ML
30 INJECTION, SOLUTION SUBCUTANEOUS NIGHTLY
Status: DISCONTINUED | OUTPATIENT
Start: 2017-11-03 | End: 2017-11-04

## 2017-11-03 RX ORDER — PANTOPRAZOLE SODIUM 40 MG/1
40 TABLET, DELAYED RELEASE ORAL NIGHTLY
Status: DISCONTINUED | OUTPATIENT
Start: 2017-11-03 | End: 2017-11-04 | Stop reason: HOSPADM

## 2017-11-03 RX ORDER — HEPARIN SODIUM 5000 [USP'U]/ML
5000 INJECTION, SOLUTION INTRAVENOUS; SUBCUTANEOUS EVERY 8 HOURS SCHEDULED
Status: DISCONTINUED | OUTPATIENT
Start: 2017-11-03 | End: 2017-11-04 | Stop reason: HOSPADM

## 2017-11-03 RX ADMIN — VENLAFAXINE 75 MG: 75 TABLET ORAL at 16:38

## 2017-11-03 RX ADMIN — HEPARIN SODIUM 5000 UNITS: 5000 INJECTION, SOLUTION INTRAVENOUS; SUBCUTANEOUS at 15:53

## 2017-11-03 RX ADMIN — VANCOMYCIN HYDROCHLORIDE 1500 MG: 5 INJECTION, POWDER, LYOPHILIZED, FOR SOLUTION INTRAVENOUS at 04:18

## 2017-11-03 RX ADMIN — METHYLPREDNISOLONE SODIUM SUCCINATE 40 MG: 40 INJECTION, POWDER, FOR SOLUTION INTRAMUSCULAR; INTRAVENOUS at 15:17

## 2017-11-03 RX ADMIN — TIOTROPIUM BROMIDE 18 MCG: 18 CAPSULE ORAL; RESPIRATORY (INHALATION) at 08:17

## 2017-11-03 RX ADMIN — INSULIN GLARGINE 30 UNITS: 100 INJECTION, SOLUTION SUBCUTANEOUS at 20:49

## 2017-11-03 RX ADMIN — DIPHENHYDRAMINE HCL 25 MG: 25 TABLET ORAL at 08:16

## 2017-11-03 RX ADMIN — PIPERACILLIN SODIUM AND TAZOBACTAM SODIUM: 3; .375 INJECTION, POWDER, LYOPHILIZED, FOR SOLUTION INTRAVENOUS at 20:48

## 2017-11-03 RX ADMIN — PIPERACILLIN SODIUM,TAZOBACTAM SODIUM 3.38 G: 3; .375 INJECTION, POWDER, FOR SOLUTION INTRAVENOUS at 07:25

## 2017-11-03 RX ADMIN — VANCOMYCIN HYDROCHLORIDE 1500 MG: 5 INJECTION, POWDER, LYOPHILIZED, FOR SOLUTION INTRAVENOUS at 15:19

## 2017-11-03 RX ADMIN — OXYCODONE HYDROCHLORIDE AND ACETAMINOPHEN 1 TABLET: 5; 325 TABLET ORAL at 14:27

## 2017-11-03 RX ADMIN — VENLAFAXINE 75 MG: 75 TABLET ORAL at 08:16

## 2017-11-03 RX ADMIN — CLOPIDOGREL BISULFATE 75 MG: 75 TABLET ORAL at 08:16

## 2017-11-03 RX ADMIN — INSULIN LISPRO 9 UNITS: 100 INJECTION, SOLUTION INTRAVENOUS; SUBCUTANEOUS at 20:48

## 2017-11-03 RX ADMIN — INSULIN LISPRO 8 UNITS: 100 INJECTION, SOLUTION INTRAVENOUS; SUBCUTANEOUS at 11:17

## 2017-11-03 RX ADMIN — INSULIN LISPRO 4 UNITS: 100 INJECTION, SOLUTION INTRAVENOUS; SUBCUTANEOUS at 09:49

## 2017-11-03 RX ADMIN — PANTOPRAZOLE SODIUM 40 MG: 40 TABLET, DELAYED RELEASE ORAL at 20:48

## 2017-11-03 RX ADMIN — HEPARIN SODIUM 5000 UNITS: 5000 INJECTION, SOLUTION INTRAVENOUS; SUBCUTANEOUS at 22:02

## 2017-11-03 RX ADMIN — INSULIN LISPRO 15 UNITS: 100 INJECTION, SOLUTION INTRAVENOUS; SUBCUTANEOUS at 16:38

## 2017-11-03 RX ADMIN — Medication 2 PUFF: at 08:20

## 2017-11-03 RX ADMIN — ASPIRIN 81 MG: 81 TABLET, COATED ORAL at 08:16

## 2017-11-03 RX ADMIN — PRAVASTATIN SODIUM 40 MG: 40 TABLET ORAL at 17:49

## 2017-11-03 RX ADMIN — OXYCODONE HYDROCHLORIDE AND ACETAMINOPHEN 1 TABLET: 5; 325 TABLET ORAL at 08:24

## 2017-11-03 RX ADMIN — OXYCODONE HYDROCHLORIDE AND ACETAMINOPHEN 1 TABLET: 5; 325 TABLET ORAL at 01:40

## 2017-11-03 RX ADMIN — FAMOTIDINE 20 MG: 20 TABLET, FILM COATED ORAL at 16:37

## 2017-11-03 RX ADMIN — OXYCODONE HYDROCHLORIDE AND ACETAMINOPHEN 1 TABLET: 5; 325 TABLET ORAL at 20:58

## 2017-11-03 RX ADMIN — Medication 2 PUFF: at 20:48

## 2017-11-03 ASSESSMENT — PAIN DESCRIPTION - PAIN TYPE
TYPE: CHRONIC PAIN
TYPE: ACUTE PAIN
TYPE: ACUTE PAIN

## 2017-11-03 ASSESSMENT — PAIN DESCRIPTION - DESCRIPTORS
DESCRIPTORS: ACHING;DISCOMFORT
DESCRIPTORS: ACHING

## 2017-11-03 ASSESSMENT — ENCOUNTER SYMPTOMS
WHEEZING: 0
VOMITING: 0
DIARRHEA: 0
COUGH: 0
ABDOMINAL PAIN: 0
SHORTNESS OF BREATH: 1
CONSTIPATION: 0
NAUSEA: 0

## 2017-11-03 ASSESSMENT — PAIN DESCRIPTION - ONSET
ONSET: ON-GOING
ONSET: PROGRESSIVE

## 2017-11-03 ASSESSMENT — PAIN SCALES - GENERAL
PAINLEVEL_OUTOF10: 8
PAINLEVEL_OUTOF10: 3
PAINLEVEL_OUTOF10: 6
PAINLEVEL_OUTOF10: 8
PAINLEVEL_OUTOF10: 7
PAINLEVEL_OUTOF10: 7
PAINLEVEL_OUTOF10: 9

## 2017-11-03 ASSESSMENT — PAIN DESCRIPTION - FREQUENCY
FREQUENCY: CONTINUOUS
FREQUENCY: INTERMITTENT

## 2017-11-03 ASSESSMENT — PAIN DESCRIPTION - LOCATION
LOCATION: BACK;LEG
LOCATION: OTHER (COMMENT)

## 2017-11-03 ASSESSMENT — PAIN DESCRIPTION - PROGRESSION
CLINICAL_PROGRESSION: NOT CHANGED
CLINICAL_PROGRESSION: NOT CHANGED

## 2017-11-03 ASSESSMENT — PAIN - FUNCTIONAL ASSESSMENT: PAIN_FUNCTIONAL_ASSESSMENT: 0-10

## 2017-11-03 ASSESSMENT — PAIN DESCRIPTION - ORIENTATION: ORIENTATION: RIGHT;LEFT;LOWER

## 2017-11-03 ASSESSMENT — ACTIVITIES OF DAILY LIVING (ADL): EFFECT OF PAIN ON DAILY ACTIVITIES: DIFFICULTY WALKING

## 2017-11-03 NOTE — PROGRESS NOTES
[facility]    Progress Note    11/3/2017    7:49 AM    Name:   Napolean Seip. MRN:     [id number]     Acct:      [de-identified]   Room:   2092/2092-01   Day:  1  Admit Date:  11/2/2017 12:18 PM    PCP:   Libra Brennan MD  Code Status:  Full Code    Subjective:     C/C:   Chief Complaint   Patient presents with    Other     sores to both legs     Interval History Status: not changed. Pt refused BIPAP overnight, upon further questioning endorses poor compliance with CPAP at home. Pt reports generalized improvement in pain/swelling in b/l LE. Complete resolution of n/v, lightheadedness. Brief History:        The patient is a 45 y.o.   male with hx obesity, BARBY, COPD, CKD stage 3, DM2, chronic venous stasis with LE edema who presents with Other (sores to both legs) and he is admitted to the hospital for the management of  B/l LE cellulitis. Pt endorses hx of LE swelling, prior admission for same in July, treated with vancomycin, discharged on doxycycline. Pt states that over the last week his LE have become increasingly red and painful, TTP, b/l, R>L. Notes 1 non-healing ulcer which has developed on each LE. Has recently completed course augmentin, with no improvement. Review of Systems:     Review of Systems   Constitutional: Negative for chills and fever. Respiratory: Positive for shortness of breath (at baseline). Negative for cough and wheezing. Cardiovascular: Positive for leg swelling (improving). Negative for chest pain. Gastrointestinal: Negative for abdominal pain, constipation, diarrhea, nausea and vomiting. Genitourinary: Negative for dysuria. Musculoskeletal: Negative for myalgias. Skin: Negative for rash. Neurological: Negative for dizziness and headaches. Medications: Allergies:     Allergies   Allergen Reactions    Gabapentin      Worsening CKD    Levaquin [Levofloxacin]     Lorazepam [place]      Nsaids      CHF!  Levofloxacin Nausea And Vomiting       Current Meds:   Scheduled Meds:    vancomycin (VANCOCIN) intermittent dosing (placeholder)   Other RX Placeholder    mometasone-formoterol  2 puff Inhalation BID    clopidogrel  75 mg Oral Daily    fluticasone  2 spray Nasal Daily    pravastatin  40 mg Oral QPM    tiotropium  18 mcg Inhalation Daily    venlafaxine  75 mg Oral BID WC    sodium chloride flush  10 mL Intravenous 2 times per day    piperacillin-tazobactam (ZOSYN) 3.375 g in dextrose 5% IVPB extended infusion (mini-bag)  3.375 g Intravenous Q8H    aspirin  81 mg Oral Daily    insulin lispro  0-12 Units Subcutaneous TID WC    insulin lispro  0-6 Units Subcutaneous Nightly    diphenhydrAMINE  25 mg Oral BID    vancomycin  1,500 mg Intravenous Q12H     Continuous Infusions:    sodium chloride 75 mL/hr at 11/02/17 1803    dextrose       PRN Meds: albuterol, albuterol sulfate HFA, sodium chloride flush, potassium chloride **OR** potassium chloride **OR** potassium chloride, magnesium sulfate, acetaminophen, magnesium hydroxide, bisacodyl, ondansetron, nicotine, oxyCODONE-acetaminophen, glucose, dextrose, glucagon (rDNA), dextrose, sodium chloride flush    Data:     Past Medical History:   has a past medical history of Acute on chronic kidney failure ([facility] [address] Anxiety; Arthropathy, unspecified, other specified sites; Asthma; Bilateral lower leg cellulitis; Bilateral lower leg cellulitis; CAD (coronary artery disease); Cellulitis of both lower extremities; CHF (congestive heart failure), NYHA class III ([address] Chronic back pain; Chronic headaches; COPD (chronic obstructive pulmonary disease) ([facility] [address] Diabetic neuropathy ([facility] [address] Displacement of lumbar intervertebral disc without myelopathy; Ear infection; Facial cellulitis; Fall; GERD (gastroesophageal reflux disease); Head injury; Hearing loss in right ear; Hepatic steatosis; History of rib fracture;  Hyperlipidemia; Hypertension; Insomnia; Mastoiditis of left side; Mixed conductive and sensorineural hearing loss of both ears; Morbid obesity with BMI of 45.0-49.9, adult ([facility] [address] Obesity; BARBY on CPAP; Otitis externa of left ear; Pancreatitis chronic; Renal insufficiency; Severe depression ([address] Syncope; Tinnitus of both ears; Type 2 diabetes mellitus with stage 3 chronic kidney disease, with long-term current use of insulin ([facility] [address] and Type II or unspecified type diabetes mellitus without mention of complication, not stated as uncontrolled. Social History:   reports that he quit smoking about 3 months ago. His smoking use included Cigarettes. He started smoking about 32 years ago. He has a 90.00 pack-year smoking history. He quit smokeless tobacco use about 22 years ago. His smokeless tobacco use included Snuff. He reports that he uses drugs, including Marijuana. He reports that he does not drink alcohol. Family History:   Family History   Problem Relation Age of Onset    Heart Disease Mother       age 64 from [facility] High Blood Pressure Mother     Diabetes Mother     High Blood Pressure Father       age 80 from CKD and Lung Fibrosis    Kidney Disease Father     Heart Disease Sister     Heart Attack Sister     Obesity Sister     Diabetes Sister        Vitals:  BP (!) 127/54   Pulse 88   Temp 98.1 °F (36.7 °C) (Oral)   Resp 19   Ht 6' (1.829 m)   Wt (!) 373 lb 10.9 oz (169.5 kg)   SpO2 98%   BMI 50.68 kg/m²   Temp (24hrs), Av.1 °F (36.7 °C), Min:97.5 °F (36.4 °C), Max:98.5 °F (36.9 °C)    Recent Labs      17   1711  17   2111   POCGLU  117*  238*       I/O (24Hr):     Intake/Output Summary (Last 24 hours) at 17 0749  Last data filed at 17 3019   Gross per 24 hour   Intake             1185 ml   Output              675 ml   Net              510 ml       Labs:    Lab Results   Component Value Date    WBC 9.7 2017    HGB 12.6 (L) 2017    HCT 38.1 (L) 2017 disease, with long-term current use of insulin ([facility] [address] [E11.22, N18.3, Z79.4] 12/26/2016    Stasis dermatitis of both legs [I87.2] 08/21/2016    BARBY (obstructive sleep apnea) [G47.33] 06/24/2016    Bilateral lower leg cellulitis [L03.116, L03.115] 02/17/2016    Class 3 obesity due to excess calories with body mass index (BMI) of 50.0 to 59.9 in adult [facility]) [E66.09, Z68.43] 06/16/2015    CKD (chronic kidney disease) stage 3, GFR 30-59 ml/min [N18.3] 05/08/2013    COPD (chronic obstructive pulmonary disease) ([facility]) [J44.9]     Type 2 diabetes mellitus with diabetic neuropathy, with long-term current use of insulin ([facility]) [E11.40, Z79.4]        Plan:        B/L LE cellulitis likely 2/2 chronic venous stasis/diabetes, sepsis unlikely, no acidosis  - SIRS criteria: temp WNL, HR to 102, WBC WNL, intermittent tachypnea  - BP labile, diastolic low to 35'S, may be related to BARBY, Pt refusing BIPAP  - Hx of prior cellulitis, chronic lower extremity edema  - Vancomycin, pharm to dose  - Zosyn  - Trend BMP; cr 1.43 -> 1.56  - Holding Bumex  - Procalcitonin 0.17, lactic acid 2.4 2/2 cellulitis infection  - NS @ 75mL/hr     DM type 2, insulin dependent with CKD stage III  - reports humalin 30 units in morning, 35 units in afternoon, 15 units in evening at home  - Januvia home regimen  - SSI     BARBY  - On CPAP at home with 2L O2 at night  - Pt refusing BIPAP overnight  - Continue home CPAP if available  - Pt counseled on importance of compliance with CPAP/BiPAP in BARBY     COPD  - Pt reports SOB at baseline  - Continue home regimen     Obesity  - BMI >14, complicates all aspects of care     Acute episode hypotension, N/V, resolved  - Unclear cause, likely vasovagal  - CT PE neg  - Stopping diphenhydramine    Brandyn Gibson MD  11/3/2017  7:49 AM   Attending Physician Statement  Patient seen and examined  I have discussed the care of the patient, including pertinent history and exam findings,  with the resident.  I have reviewed the key elements of all parts of the encounter with the resident. I agree with the assessment, plan and orders as documented by the resident.     Melisa Suh

## 2017-11-03 NOTE — PROGRESS NOTES
Problem: Breathing Pattern - Ineffective:  Goal: Ability to achieve and maintain a regular respiratory rate will improve  Ability to achieve and maintain a regular respiratory rate will improve  Outcome: Ongoing  Respiratory status stable. Assessment as charted. No c/o or s/s of distress.      Problem: Gas Exchange - Impaired:  Goal: Levels of oxygenation will improve  Levels of oxygenation will improve  Outcome: Ongoing  Oxygen level stable. See VS charting.

## 2017-11-03 NOTE — PROGRESS NOTES
Bipap on standby at this time. Pulse Ox-95% 2lpm-   BS--  dimin   Skin score-- 0     Nasal gel pad available at bedside. Pt awake/alert/no distress noted.  Pt refuses

## 2017-11-03 NOTE — PROGRESS NOTES
Pt developed new onset c/o odynophagia, b/l jaw pain, hip and knee pain after eating lunch. Denies SOB, denies sensation of swelling in tongue. Endorsing difficulty swallowing solids and liquids. On physical exam throat appears mildly erythematous, mildly edematous. Pt previously c/o bed causing joint pain. Will give one dose solumedrol 40mg IV for swelling, speech eval and treat.

## 2017-11-03 NOTE — PLAN OF CARE
Problem: Pain:  Goal: Pain level will decrease  Pain level will decrease   Outcome: Ongoing    Goal: Control of acute pain  Control of acute pain   Outcome: Ongoing    Goal: Control of chronic pain  Control of chronic pain   Outcome: Ongoing  Pt medicated with percocet x2 this shift with some relief but still complains of bilat leg pain, back pain and joint pain. Residents notified of new joint pain and sore throat and one time dose of steroids given.

## 2017-11-03 NOTE — PLAN OF CARE
Problem: Falls - Risk of  Goal: Absence of falls  Outcome: Met This Shift  Pt remains free from falls.

## 2017-11-03 NOTE — DISCHARGE INSTR - DIET

## 2017-11-04 VITALS
WEIGHT: 315 LBS | HEART RATE: 83 BPM | RESPIRATION RATE: 20 BRPM | OXYGEN SATURATION: 96 % | BODY MASS INDEX: 42.66 KG/M2 | HEIGHT: 72 IN | SYSTOLIC BLOOD PRESSURE: 155 MMHG | TEMPERATURE: 97.9 F | DIASTOLIC BLOOD PRESSURE: 59 MMHG

## 2017-11-04 LAB
EKG ATRIAL RATE: 101 BPM
EKG P AXIS: 65 DEGREES
EKG P-R INTERVAL: 136 MS
EKG Q-T INTERVAL: 362 MS
EKG QRS DURATION: 92 MS
EKG QTC CALCULATION (BAZETT): 469 MS
EKG R AXIS: -5 DEGREES
EKG T AXIS: 79 DEGREES
EKG VENTRICULAR RATE: 101 BPM
GLUCOSE BLD-MCNC: 290 MG/DL (ref 75–110)
GLUCOSE BLD-MCNC: 332 MG/DL (ref 75–110)
GLUCOSE BLD-MCNC: 368 MG/DL (ref 75–110)

## 2017-11-04 PROCEDURE — 6370000000 HC RX 637 (ALT 250 FOR IP): Performed by: RADIOLOGY

## 2017-11-04 PROCEDURE — 6360000002 HC RX W HCPCS: Performed by: INTERNAL MEDICINE

## 2017-11-04 PROCEDURE — 82947 ASSAY GLUCOSE BLOOD QUANT: CPT

## 2017-11-04 PROCEDURE — 6370000000 HC RX 637 (ALT 250 FOR IP): Performed by: HOSPITALIST

## 2017-11-04 PROCEDURE — 6370000000 HC RX 637 (ALT 250 FOR IP): Performed by: STUDENT IN AN ORGANIZED HEALTH CARE EDUCATION/TRAINING PROGRAM

## 2017-11-04 PROCEDURE — 87205 SMEAR GRAM STAIN: CPT

## 2017-11-04 PROCEDURE — 87186 SC STD MICRODIL/AGAR DIL: CPT

## 2017-11-04 PROCEDURE — G0008 ADMIN INFLUENZA VIRUS VAC: HCPCS | Performed by: INTERNAL MEDICINE

## 2017-11-04 PROCEDURE — 6360000002 HC RX W HCPCS: Performed by: RADIOLOGY

## 2017-11-04 PROCEDURE — 90686 IIV4 VACC NO PRSV 0.5 ML IM: CPT | Performed by: INTERNAL MEDICINE

## 2017-11-04 PROCEDURE — 86403 PARTICLE AGGLUT ANTBDY SCRN: CPT

## 2017-11-04 PROCEDURE — 87070 CULTURE OTHR SPECIMN AEROBIC: CPT

## 2017-11-04 PROCEDURE — 99239 HOSP IP/OBS DSCHRG MGMT >30: CPT | Performed by: INTERNAL MEDICINE

## 2017-11-04 RX ORDER — DOXYCYCLINE 100 MG/1
100 CAPSULE ORAL EVERY 12 HOURS SCHEDULED
Status: DISCONTINUED | OUTPATIENT
Start: 2017-11-04 | End: 2017-11-04 | Stop reason: HOSPADM

## 2017-11-04 RX ORDER — DOXYCYCLINE HYCLATE 100 MG
100 TABLET ORAL 2 TIMES DAILY
Qty: 20 TABLET | Refills: 0 | Status: CANCELLED | OUTPATIENT
Start: 2017-11-04 | End: 2017-11-14

## 2017-11-04 RX ORDER — DOXYCYCLINE 100 MG/1
100 CAPSULE ORAL EVERY 12 HOURS SCHEDULED
Qty: 20 CAPSULE | Refills: 0 | Status: SHIPPED | OUTPATIENT
Start: 2017-11-04 | End: 2017-11-14

## 2017-11-04 RX ADMIN — Medication 2 PUFF: at 08:16

## 2017-11-04 RX ADMIN — OXYCODONE HYDROCHLORIDE AND ACETAMINOPHEN 1 TABLET: 5; 325 TABLET ORAL at 12:01

## 2017-11-04 RX ADMIN — DOXYCYCLINE 100 MG: 100 CAPSULE ORAL at 11:55

## 2017-11-04 RX ADMIN — TIOTROPIUM BROMIDE 18 MCG: 18 CAPSULE ORAL; RESPIRATORY (INHALATION) at 08:16

## 2017-11-04 RX ADMIN — HEPARIN SODIUM 5000 UNITS: 5000 INJECTION, SOLUTION INTRAVENOUS; SUBCUTANEOUS at 06:00

## 2017-11-04 RX ADMIN — INSULIN HUMAN 150 UNITS: 500 INJECTION, SOLUTION SUBCUTANEOUS at 17:42

## 2017-11-04 RX ADMIN — VENLAFAXINE 75 MG: 75 TABLET ORAL at 08:18

## 2017-11-04 RX ADMIN — PRAVASTATIN SODIUM 40 MG: 40 TABLET ORAL at 17:42

## 2017-11-04 RX ADMIN — VENLAFAXINE 75 MG: 75 TABLET ORAL at 17:42

## 2017-11-04 RX ADMIN — FLUTICASONE PROPIONATE 2 SPRAY: 50 SPRAY, METERED NASAL at 08:16

## 2017-11-04 RX ADMIN — HEPARIN SODIUM 5000 UNITS: 5000 INJECTION, SOLUTION INTRAVENOUS; SUBCUTANEOUS at 14:41

## 2017-11-04 RX ADMIN — INFLUENZA VIRUS VACCINE 0.5 ML: 15; 15; 15; 15 SUSPENSION INTRAMUSCULAR at 14:36

## 2017-11-04 RX ADMIN — CLOPIDOGREL BISULFATE 75 MG: 75 TABLET ORAL at 08:15

## 2017-11-04 RX ADMIN — INSULIN LISPRO 12 UNITS: 100 INJECTION, SOLUTION INTRAVENOUS; SUBCUTANEOUS at 17:43

## 2017-11-04 RX ADMIN — INSULIN LISPRO 9 UNITS: 100 INJECTION, SOLUTION INTRAVENOUS; SUBCUTANEOUS at 11:59

## 2017-11-04 RX ADMIN — INSULIN LISPRO 12 UNITS: 100 INJECTION, SOLUTION INTRAVENOUS; SUBCUTANEOUS at 09:03

## 2017-11-04 RX ADMIN — ASPIRIN 81 MG: 81 TABLET, COATED ORAL at 08:15

## 2017-11-04 RX ADMIN — OXYCODONE HYDROCHLORIDE AND ACETAMINOPHEN 1 TABLET: 5; 325 TABLET ORAL at 03:36

## 2017-11-04 ASSESSMENT — PAIN SCALES - GENERAL
PAINLEVEL_OUTOF10: 6
PAINLEVEL_OUTOF10: 3
PAINLEVEL_OUTOF10: 6

## 2017-11-04 ASSESSMENT — PAIN DESCRIPTION - PAIN TYPE: TYPE: CHRONIC PAIN

## 2017-11-04 ASSESSMENT — ENCOUNTER SYMPTOMS
NAUSEA: 0
COUGH: 0
CONSTIPATION: 0
WHEEZING: 0
VOMITING: 0
ABDOMINAL PAIN: 0
DIARRHEA: 0
SHORTNESS OF BREATH: 1

## 2017-11-04 ASSESSMENT — PAIN DESCRIPTION - LOCATION: LOCATION: BACK

## 2017-11-04 NOTE — DISCHARGE SUMMARY
[address]    Discharge Summary     Patient ID: Jimbo Wilde :  1965   MRN: [id number]     ACCOUNT:  [de-identified]   Patient's PCP: Margaret Padron MD  Admit Date: 2017   Discharge Date: 2017     Length of Stay: 2  Code Status:  Full Code  Admitting Physician: Evelin Bashir MD  Discharge Physician: Halima Harkins MD     Active Discharge Diagnoses:     Primary Problem  Bilateral lower leg cellulitis      [facility] Problems    Diagnosis Date Noted    Bilateral leg edema [R60.0] 2017    Type 2 diabetes mellitus with stage 3 chronic kidney disease, with long-term current use of insulin ([facility]) [E11.22, N18.3, Z79.4] 2016    Stasis dermatitis of both legs [I87.2] 2016    BARBY (obstructive sleep apnea) [G47.33] 2016    Bilateral lower leg cellulitis [L03.116, L03.115] 2016    Class 3 obesity due to excess calories with body mass index (BMI) of 50.0 to 59.9 in adult [facility]) [E66.09, Z68.43] 2015    CKD (chronic kidney disease) stage 3, GFR 30-59 ml/min [N18.3] 2013    COPD (chronic obstructive pulmonary disease) ([facility]) [J44.9]     Type 2 diabetes mellitus with diabetic neuropathy, with long-term current use of insulin ([facility] [address] [E11.40, Z79.4]        Admission Condition:  fair     Discharged Condition: good    Hospital Stay:     Hospital Course:  Jimbo Wilde is a 45 y.o. male who was admitted for the management of   Bilateral lower leg cellulitis , presented to ER with Other (sores to both legs)  Pt was initially treated with IV vanc and zosyn, showed immediate improvement with decreased pain and swelling. Transitioned to oral doxycycline for discharge. Discharged with doxycycline 100mg BID x 10 days.        Significant therapeutic interventions: CTA PE neg    Significant Diagnostic Studies:   Labs / Micro:  CBC:   Lab Results   Component Value Date WBC 9.7 11/02/2017    RBC 4.48 11/02/2017    HGB 12.6 11/02/2017    HCT 38.1 11/02/2017    MCV 85.1 11/02/2017    MCH 28.2 11/02/2017    MCHC 33.1 11/02/2017    RDW 16.5 11/02/2017     11/02/2017     CMP:    Lab Results   Component Value Date    GLUCOSE 307 11/03/2017     11/03/2017    K 4.8 11/03/2017    CL 99 11/03/2017    CO2 27 11/03/2017    BUN 30 11/03/2017    CREATININE 1.56 11/03/2017    ANIONGAP 10 11/03/2017    ALKPHOS 65 08/16/2017    ALT 15 08/16/2017    AST 15 08/16/2017    BILITOT 0.31 08/16/2017    LABALBU 4.1 08/16/2017    ALBUMIN NOT REPORTED 08/16/2017    LABGLOM 47 11/03/2017    GFRAA 57 11/03/2017    GFR      11/03/2017    GFR NOT REPORTED 11/03/2017    PROT 7.2 08/16/2017    CALCIUM 8.7 11/03/2017       Radiology:    Xr Chest Standard (2 Vw)    Result Date: 11/2/2017  EXAMINATION: TWO VIEWS OF THE CHEST 11/2/2017 1:06 pm COMPARISON: Chest x-ray from 04/27/2017 HISTORY: ORDERING SYSTEM PROVIDED HISTORY: hx chf TECHNOLOGIST PROVIDED HISTORY: Reason for exam:->hx chf Ordering Physician Provided Reason for Exam: Pt has redness/cellulitis on lower legs on/off x 20 months h/o CHF Acuity: Chronic Type of Exam: Ongoing Additional signs and symptoms: Pt has redness/cellulitis on lower legs on/off x 20 months h/o CHF FINDINGS: There is no focal airspace consolidation, pleural effusion or pneumothorax. The cardiomediastinal silhouette appears within normal limits and there is no pulmonary vascular congestion. There are similar moderate hypertrophic degenerative changes of the visualized spine. Visualized osseous structures appear intact, and are otherwise grossly unremarkable, given the non dedicated imaging. No radiographic evidence for acute cardiopulmonary disease process.      Ct Chest Pulmonary Embolism W Contrast    Result Date: 11/2/2017  EXAMINATION: CTA OF THE CHEST 11/2/2017 3:07 pm TECHNIQUE: CTA of the chest was performed after the administration of intravenous contrast. Multiplanar reformatted images are provided for review. MIP images are provided for review. Dose modulation, iterative reconstruction, and/or weight based adjustment of the mA/kV was utilized to reduce the radiation dose to as low as reasonably achievable. 100 mL intravenous Isovue 370 was used. COMPARISON: CT chest dated 03/22/2011 HISTORY: ORDERING SYSTEM PROVIDED HISTORY: SOB, hypoxia TECHNOLOGIST PROVIDED HISTORY: Ordering Physician Provided Reason for Exam: SOB, ?PE Acuity: Unknown Type of Exam: Unknown FINDINGS: Pulmonary Arteries: Pulmonary arteries are adequately opacified for evaluation. No evidence of intraluminal filling defect to suggest pulmonary embolism. Main pulmonary artery is normal in caliber. Mediastinum: No evidence of mediastinal lymphadenopathy. The heart and pericardium demonstrate no acute abnormality. There is no acute abnormality of the thoracic aorta. Lungs/pleura: The lungs are without acute process. No focal consolidation or pulmonary edema. No evidence of pleural effusion or pneumothorax. There is mild diffuse bronchial wall thickening. Upper Abdomen: Liver is diffusely hypoattenuating, compatible with hepatic steatosis. Limited visualized upper abdominal structures are otherwise unremarkable. Soft Tissues/Bones: Bilateral gynecomastia is noted. Soft tissues are otherwise unremarkable. No acute or suspicious osseous abnormality. 1.  No evidence of pulmonary embolism. 2.  Mild diffuse bronchial wall thickening, which could be related to acute or chronic bronchitis. No focal airspace consolidation or evidence of pulmonary edema. 3.  Hepatic steatosis. 4.  Gynecomastia.          Consultations:    Consults:     Final Specialist Recommendations/Findings:   PHARMACY TO DOSE VANCOMYCIN  PHARMACY TO DOSE VANCOMYCIN  IP CONSULT TO PRIMARY CARE PROVIDER  PHARMACY TO DOSE VANCOMYCIN      The patient was seen and examined on day of discharge and this discharge summary is in conjunction with any daily progress note from day of discharge. Discharge plan:     Disposition: Home    Physician Follow Up:     Jose Elias Andres MD  [address]  [address] [facility] [address]  [facility] [address]  [phone]    In 1 week       Diet: diabetic diet    Activity: As tolerated    Instructions to Patient:   Take all medications as instructed. Follow up with primary care provider in 1 week. If symptoms increase in severity contact primary care provider or return to Emergency Department for evaluation. Discharge Medications:      Medication List      START taking these medications    doxycycline monohydrate 100 MG capsule  Commonly known as:  MONODOX  Take 1 capsule by mouth every 12 hours for 10 days        CHANGE how you take these medications    bumetanide 1 MG tablet  Commonly known as:  BUMEX  Take 3 tablets by mouth 2 times daily  What changed:  additional instructions        CONTINUE taking these medications    ADVAIR -21 MCG/ACT inhaler  Generic drug:  fluticasone-salmeterol  INHALE TWO PUFFS BY MOUTH TWICE A DAY     * albuterol (2.5 MG/3ML) 0.083% nebulizer solution  Commonly known as:  PROVENTIL  Take 3 mLs by nebulization every 6 hours as needed for Wheezing or Shortness of Breath     * PROAIR  (90 Base) MCG/ACT inhaler  Generic drug:  albuterol sulfate HFA  INHALE TWO PUFFS BY MOUTH EVERY 6 HOURS AS NEEDED FOR WHEEZING OR FOR SHORTNESS OF BREATH     ammonium lactate 12 % lotion  Commonly known as:  LAC-HYDRIN  Apply topically daily on the legs.      aspirin 81 MG tablet     CIPRODEX 0.3-0.1 % otic suspension  Generic drug:  ciprofloxacin-dexamethasone     clopidogrel 75 MG tablet  Commonly known as:  PLAVIX  TAKE ONE TABLET BY MOUTH DAILY     COMFORT ASSIST INSULIN SYRINGE 31G X 5/16\" 1 ML Misc  Generic drug:  Insulin Syringe-Needle U-100     docusate sodium 100 MG capsule  Commonly known as:  COLACE  Take 1 capsule by mouth 2 times daily as needed for Constipation     fluticasone 0.05 % cream  Commonly known as:  CUTIVATE     fluticasone 50 MCG/ACT nasal spray  Commonly known as:  FLONASE  2 sprays by Nasal route daily     insulin regular human 500 UNIT/ML concentrated injection vial  Commonly known as:  humuLIN R U-500     Iron 325 (65 Fe) MG Tabs  Take 1 tablet by mouth daily     lidocaine 4 % cream  Commonly known as:  LMX  Apply topically daily as needed. lisinopril 5 MG tablet  Commonly known as:  PRINIVIL;ZESTRIL  Take 1 tablet by mouth daily . Discontinued Lisinopril  10 mg     Melatonin 10 MG Tabs  Take 10 mg by mouth nightly as needed (insomnia)     metolazone 2.5 MG tablet  Commonly known as:  ZAROXOLYN  Take 1 tablet by mouth once a week On tuesday     metoprolol tartrate 50 MG tablet  Commonly known as:  LOPRESSOR  Take 1 tablet by mouth 2 times daily     miconazole 2 % powder  Commonly known as:  MICOTIN  Apply topically 2 times daily axillary for rash     mupirocin 2 % ointment  Commonly known as:  BACTROBAN  Apply topically 3 times daily on the affected area     nitroGLYCERIN 0.4 MG SL tablet  Commonly known as:  NITROSTAT  Place 1 tablet under the tongue every 5 minutes as needed for Chest pain     ONE TOUCH ULTRA TEST strip  Generic drug:  glucose blood VI test strips  TWO TIMES A DAY     ONE TOUCH ULTRASOFT LANCETS Misc  USE ONE LANCET TO TEST THREE TIMES A DAY     oxyCODONE HCl 10 MG immediate release tablet  Commonly known as:  OXY-IR  Take 1 tablet by mouth every 8 hours as needed for Pain .     pantoprazole 40 MG tablet  Commonly known as:  PROTONIX  Take 1 tablet by mouth nightly     pravastatin 40 MG tablet  Commonly known as:  PRAVACHOL  Take 1 tablet by mouth every evening STOP ATORVASTATIN     SITagliptin 25 MG tablet  Commonly known as:  JANUVIA  Take 1 tablet by mouth daily     SPIRIVA HANDIHALER 18 MCG inhalation capsule  Generic drug:  tiotropium  INHALE THE ENTIRE CONTENTS OF 1 CAPSULE ONCE A DAY USING HANDIHALER DEVICE     spironolactone 50 MG tablet  Commonly known as:  ALDACTONE  Take 1 tablet by mouth daily     tiZANidine 4 MG tablet  Commonly known as:  ZANAFLEX  TAKE ONE TABLET BY MOUTH EVERY 8 HOURS AS NEEDED FOR BACK PAIN     venlafaxine 75 MG tablet  Commonly known as:  EFFEXOR  TAKE ONE TABLET BY MOUTH TWICE A DAY *CALL FOR REFILL OR DOSE INCREASE IF TOLERATED. STOP TAKING PROZAC*     vitamin D 1000 UNIT Tabs tablet  Commonly known as:  CHOLECALCIFEROL  Take 1 tablet by mouth daily        * This list has 2 medication(s) that are the same as other medications prescribed for you. Read the directions carefully, and ask your doctor or other care provider to review them with you. Where to Get Your Medications      These medications were sent to [address], [address] [place] [address]    Phone:  [phone]   · doxycycline monohydrate 100 MG capsule         Time Spent on discharge is  31 mins in patient examination, evaluation, counseling as well as medication reconciliation, prescriptions for required medications, discharge plan and follow up. Electronically signed by   Mindy Kessler MD  11/4/2017  3:10 PM   Attending Physician Statement  Patient seen and examined  I have discussed the care of the patient, including pertinent history and exam findings,  with the resident. I have reviewed the key elements of all parts of the encounter with the resident. I agree with the assessment, plan and orders as documented by the resident. Spenser Urena      Thank you Dr. Yuli Herring MD for the opportunity to be involved in this patient's care.

## 2017-11-04 NOTE — PLAN OF CARE
Problem: Falls - Risk of  Goal: Absence of falls  Outcome: Ongoing  Pt assessed as a fall risk this shift. Remains free from falls and accidental injury at this time. Fall precautions in place, including falling star sign and fall risk band on pt. Floor free from obstacles, and bed is locked and in lowest position. Adequate lighting provided. Pt encouraged to call before getting OOB for any need. Bed alarm activated. Will continue to monitor needs during hourly rounding, and reinforce education on use of call light.         Problem: Breathing Pattern - Ineffective:  Goal: Ability to achieve and maintain a regular respiratory rate will improve  Ability to achieve and maintain a regular respiratory rate will improve   Outcome: Ongoing  Inspiratory wheezes heard on initial assessment    Problem: Gas Exchange - Impaired:  Goal: Levels of oxygenation will improve  Levels of oxygenation will improve   Outcome: Ongoing  Patient O2 remained at 96% on 2L NC    Problem: Pain:  Goal: Pain level will decrease  Pain level will decrease   Outcome: Ongoing  Patient receiving one percocet q6h prn

## 2017-11-04 NOTE — PROGRESS NOTES
Around 0400 ICU RN over to start new IV on patient, RN was unsuccessful with only one attempt. Patient refusing to let RN try again, he states he is done and wants to go home.

## 2017-11-04 NOTE — PROGRESS NOTES
[facility]    Progress Note    11/4/2017    8:41 AM    Name:   Tone Rhodes. MRN:     [id number]     Acct:      [de-identified]   Room:   2092/2092-01  IP Day:  2  Admit Date:  11/2/2017 12:18 PM    PCP:   Saul Panda MD  Code Status:  Full Code    Subjective:     C/C:   Chief Complaint   Patient presents with    Other     sores to both legs     Interval History Status: not changed. Loss of IV overnight, patient frustrated, indicating he wants to go home. Refusing IV, stating he will go home AMA if not discharged. States he feels baseline, no complaints. Brief History:        The patient is a 45 y.o.   male with hx obesity, BARBY, COPD, CKD stage 3, DM2, chronic venous stasis with LE edema who presents with Other (sores to both legs) and he is admitted to the hospital for the management of  B/l LE cellulitis. Pt endorses hx of LE swelling, prior admission for same in July, treated with vancomycin, discharged on doxycycline. Pt states that over the last week his LE have become increasingly red and painful, TTP, b/l, R>L. Notes 1 non-healing ulcer which has developed on each LE. Has recently completed course augmentin, with no improvement. Review of Systems:     Review of Systems   Constitutional: Negative for chills and fever. Respiratory: Positive for shortness of breath (at baseline). Negative for cough and wheezing. Cardiovascular: Positive for leg swelling (at baseline). Negative for chest pain. Gastrointestinal: Negative for abdominal pain, constipation, diarrhea, nausea and vomiting. Genitourinary: Negative for dysuria. Musculoskeletal: Negative for myalgias. Skin: Negative for rash. Neurological: Negative for dizziness and headaches. Medications: Allergies:     Allergies   Allergen Reactions    Gabapentin      Worsening CKD    Levaquin [Levofloxacin]     Lorazepam      Falls      Nsaids right ear; Hepatic steatosis; History of rib fracture; Hyperlipidemia; Hypertension; Insomnia; Mastoiditis of left side; Mixed conductive and sensorineural hearing loss of both ears; Morbid obesity with BMI of 45.0-49.9, adult ([facility] [address] Obesity; BARBY on CPAP; Otitis externa of left ear; Pancreatitis chronic; Renal insufficiency; Severe depression ([address] Syncope; Tinnitus of both ears; Type 2 diabetes mellitus with stage 3 chronic kidney disease, with long-term current use of insulin ([facility] [address] and Type II or unspecified type diabetes mellitus without mention of complication, not stated as uncontrolled. Social History:   reports that he quit smoking about 3 months ago. His smoking use included Cigarettes. He started smoking about 32 years ago. He has a 90.00 pack-year smoking history. He quit smokeless tobacco use about 22 years ago. His smokeless tobacco use included Snuff. He reports that he uses drugs, including Marijuana. He reports that he does not drink alcohol. Family History:   Family History   Problem Relation Age of Onset    Heart Disease Mother       age 64 [facility] High Blood Pressure Mother     Diabetes Mother     High Blood Pressure Father       age 80 from CKD and Lung Fibrosis    Kidney Disease Father     Heart Disease Sister     Heart Attack Sister     Obesity Sister     Diabetes Sister        Vitals:  BP (!) 157/56   Pulse 88   Temp 98.4 °F (36.9 °C) (Oral)   Resp 20   Ht 6' (1.829 m)   Wt (!) 373 lb 10.9 oz (169.5 kg)   SpO2 97%   BMI 50.68 kg/m²   Temp (24hrs), Av.4 °F (36.9 °C), Min:97.9 °F (36.6 °C), Max:99.3 °F (37.4 °C)    Recent Labs      17   1059  17   1606  17   2035  17   0831   POCGLU  308*  398*  411*  332*       I/O (24Hr):     Intake/Output Summary (Last 24 hours) at 17 0841  Last data filed at 17 0613   Gross per 24 hour   Intake             1832 ml   Output             1250 ml   Net              582 ml Labs:    Lab Results   Component Value Date    WBC 9.7 11/02/2017    HGB 12.6 (L) 11/02/2017    HCT 38.1 (L) 11/02/2017    MCV 85.1 11/02/2017     11/02/2017     Lab Results   Component Value Date     11/03/2017    K 4.8 11/03/2017    CL 99 11/03/2017    CO2 27 11/03/2017    BUN 30 (H) 11/03/2017    CREATININE 1.56 (H) 11/03/2017    GLUCOSE 307 (H) 11/03/2017    CALCIUM 8.7 11/03/2017    PROT 7.2 08/16/2017    LABALBU 4.1 08/16/2017    BILITOT 0.31 08/16/2017    ALKPHOS 65 08/16/2017    AST 15 08/16/2017    ALT 15 08/16/2017    LABGLOM 47 (L) 11/03/2017    GFRAA 57 (L) 11/03/2017    GLOB NOT REPORTED 02/18/2016           Radiology:    Xr Chest Standard (2 Vw)    Result Date: 11/2/2017    No radiographic evidence for acute cardiopulmonary disease process. Ct Chest Pulmonary Embolism W Contrast    Result Date: 11/2/2017    1. No evidence of pulmonary embolism. 2.  Mild diffuse bronchial wall thickening, which could be related to acute or chronic bronchitis. No focal airspace consolidation or evidence of pulmonary edema. 3.  Hepatic steatosis. 4.  Gynecomastia. Physical Examination:        Physical Exam   Constitutional: He is oriented to person, place, and time and well-developed, well-nourished, and in no distress. No distress. Obese male resting comfortably in bed     Cardiovascular: Normal rate, regular rhythm and normal heart sounds. Pulmonary/Chest: Effort normal. No respiratory distress. He has wheezes (mild diffuse expiratory wheeze). He has no rales. Abdominal: Soft. Bowel sounds are normal. He exhibits no distension (obese abdomen). There is no tenderness. There is no rebound and no guarding. Musculoskeletal: Normal range of motion. Neurological: He is alert and oriented to person, place, and time. Skin: There is erythema.         Psychiatric: Affect normal.         Assessment:        Primary Problem  Bilateral lower leg cellulitis    Active Hospital Problems

## 2017-11-04 NOTE — PROGRESS NOTES
RN spoke with Dez Chandler regarding patients loss of IV access.  He states to inform patient that we can either try again or he has to get a PICC line to get his IV antibiotics

## 2017-11-04 NOTE — PLAN OF CARE
Problem: Falls - Risk of  Goal: Absence of falls  Outcome: Ongoing  Patient remains free from falls/injuries at this time. Call light within reach. Problem: Breathing Pattern - Ineffective:  Goal: Ability to achieve and maintain a regular respiratory rate will improve  Ability to achieve and maintain a regular respiratory rate will improve   Outcome: Ongoing  Pt's CPAP still not functioning at this time. Respiratory aware, pt states he needs to contact  for assistance with settings.

## 2017-11-04 NOTE — PROGRESS NOTES
Patient discharged home with education on all medications, follow up visits and disease process management. No s/s of distress at time of discharge. Patient states he has appointment scheduled with Dr. Jose Camejo, cardiologist, on Nov 7th.

## 2017-11-06 LAB
CULTURE: ABNORMAL
CULTURE: ABNORMAL
DIRECT EXAM: ABNORMAL
DIRECT EXAM: ABNORMAL
Lab: ABNORMAL
ORGANISM: ABNORMAL
SPECIMEN DESCRIPTION: ABNORMAL
SPECIMEN DESCRIPTION: ABNORMAL
STATUS: ABNORMAL

## 2017-11-07 ENCOUNTER — TELEPHONE (OUTPATIENT)
Dept: FAMILY MEDICINE CLINIC | Age: 52
End: 2017-11-07

## 2017-11-07 NOTE — TELEPHONE ENCOUNTER
Post Acute Care Discharge Phone Assessment     Review purpose of telephone call with: Miguel  Date: 17    - To evaluate the client after hospital discharge  - To ensure the client has received and understands self care instructions  - To identify and prevent potential adverse events  - To provide additional education and initiate post acute services       [address] [place] [address]. : 1965 Phone #: [phone] (home)    1. Hospital Information    Discharged from: [facility]   Discharge to home YES  Discharge Date:2017  Admission Date: 10/30/2017    Non-face-to-face services provided:  [facility] records: reviewed    Was instructed to follow up in: [facility] Diagnosis [facility] Consultants:  NONE    Initial contact Date: 2017  Type of Contact:  by phone      2. Assessment of Current Condition      Questions: How have you felt since discharge from the hospital:     unchanged    Did you receive a discharge summary from the hospital? yes    Is there any lingering or new fever? No    Are you eating and drinking OK? yes    Are there any new complaints of pain? Yes , HAS BEEN HAVING ABD. PAIN , DOES PASS BLOOD AT TIMES WITH BM'S, PAIN LEVEL 6 ,  HE HAD A COLONOSCOPY 2 YEARS AGO WITH DR Jimenez Lemus    If you have a wound - is the dressing clean, dry, and intact? No BOTH LOWER LEGS ARE DRAINING , HAS JOSE ANTONIO DOING WOUND CARE PER SELT. HE DOES GO TO THE LYMPHEDEMA CLINIC WEEKLY  AMD COMPRESSION STOCKING HAVE BEEN ORDERD AND HE IS GOING TO GET COMPRESSION PUMP WITH BOOTS TO DO AT HOME    Reinforce Education    Does the patient know -   1. What to do if his symptoms worsen? yes   2. For what symptoms he should call the doctor?  yes   3. What symptoms he should get help with right away? Yes    4. Does he know how to contact his physician?  yes    Are there any questions about the patients medications?  No   Does the patient have a list of all the medications that were prescribed to be taken after discharge? yes   Does the patient have all the medications that were prescribed?  yes   Is the patient taking all the prescribed medications? yes   Does the patient understand what all the medications are taken for? yes      ( Update medication list and refresh medication smartlinks below)        All Active Meds in Chart - (keep all current active meds, add hospital meds)  Current Outpatient Prescriptions   Medication Sig Dispense Refill    doxycycline monohydrate (MONODOX) 100 MG capsule Take 1 capsule by mouth every 12 hours for 10 days 20 capsule 0    metolazone (ZAROXOLYN) 2.5 MG tablet Take 1 tablet by mouth once a week On tuesday 12 tablet 0    lisinopril (PRINIVIL;ZESTRIL) 5 MG tablet Take 1 tablet by mouth daily . Discontinued Lisinopril  10 mg 90 tablet 3    venlafaxine (EFFEXOR) 75 MG tablet TAKE ONE TABLET BY MOUTH TWICE A DAY *CALL FOR REFILL OR DOSE INCREASE IF TOLERATED. STOP TAKING PROZAC* 60 tablet 5    oxyCODONE HCl (OXY-IR) 10 MG immediate release tablet Take 1 tablet by mouth every 8 hours as needed for Pain . 90 tablet 0    CIPRODEX 0.3-0.1 % otic suspension       SITagliptin (JANUVIA) 25 MG tablet Take 1 tablet by mouth daily 30 tablet 3    ammonium lactate (LAC-HYDRIN) 12 % lotion Apply topically daily on the legs.  567 g 2    PROAIR  (90 Base) MCG/ACT inhaler INHALE TWO PUFFS BY MOUTH EVERY 6 HOURS AS NEEDED FOR WHEEZING OR FOR SHORTNESS OF BREATH 18 g 5    tiZANidine (ZANAFLEX) 4 MG tablet TAKE ONE TABLET BY MOUTH EVERY 8 HOURS AS NEEDED FOR BACK PAIN 90 tablet 5    miconazole (MICOTIN) 2 % powder Apply topically 2 times daily axillary for rash 85 g 3    docusate sodium (COLACE) 100 MG capsule Take 1 capsule by mouth 2 times daily as needed for Constipation 60 capsule 3    metoprolol tartrate (LOPRESSOR) 50 MG tablet Take 1 tablet by mouth 2 times daily 60 tablet 3    bumetanide (BUMEX) 1 MG tablet Take 3 tablets by mouth each dose       aspirin 81 MG tablet Take 81 mg by mouth daily. No current facility-administered medications for this visit. Current Medications (record all meds as 'taken' or 'not taken' in home med activity)  No outpatient prescriptions have been marked as taking for the 11/7/17 encounter (Telephone) with Virgen Benedicto. Are there any questions about how the patient should take care of himself? No   Does the patient understand his diet regimen? yes   Does the patient understand his or her activity level? yes   Does the patient understand how to care for his wound? yes   Does the patient understand how to monitor his weight? N/A   Does the patient understand what to do if he becomes short of breath? yes   Does the patient understand what to do if he has increased edema? yes    Does the patient understand how to monitor vital signs? Blood Pressure? yes    Heart Rate?  yes    Blood sugar?  yes    Is the patient having any trouble with ADL's? Yes WIFE DOES HELP HIM BECAUSE HE DOES BECOME SOB   Any problems showering or bathing? Yes , SEE ABOVE   Does the patient have trouble eating or feeding themselves? No   Is the patient having trouble getting out of bed or going to the toilet? No   Is the patient able to move around as expected? No  HE  IS MOVING SLOWLY AND THAT BOTHERS HIM      Home care services initiated: no AND HE DOES NOT WANT ANY HOME CARE     Services provided:  NONE      Does that patient have equipment needs? Yes  HE HAS A W/C , WALKER AND CANE   Does the patient have the appropriate equipment? Yes   Can the patient use the equipment properly and safely? Yes    Reinforce Follow-Up  - Does patient have an appointment with his PCP? Yes  HAS A APPOINTMENT WITH DR MOORE ON 11/15/2017  - Does patient have an appointment with his specialist physicians?  Yes, HE SAW HIS CARDIOLOGIST YESTERDAY 11/6/2017      Care Coordination  Is patient assigned ambulatory care coordinator

## 2017-11-08 LAB
CULTURE: NORMAL
Lab: NORMAL
SPECIMEN DESCRIPTION: NORMAL
STATUS: NORMAL
STATUS: NORMAL

## 2017-11-09 ENCOUNTER — OFFICE VISIT (OUTPATIENT)
Dept: PULMONOLOGY | Age: 52
End: 2017-11-09
Payer: COMMERCIAL

## 2017-11-09 VITALS
DIASTOLIC BLOOD PRESSURE: 75 MMHG | HEIGHT: 72 IN | BODY MASS INDEX: 42.66 KG/M2 | OXYGEN SATURATION: 99 % | RESPIRATION RATE: 18 BRPM | WEIGHT: 315 LBS | SYSTOLIC BLOOD PRESSURE: 144 MMHG | HEART RATE: 79 BPM

## 2017-11-09 DIAGNOSIS — J96.02 ACUTE RESPIRATORY FAILURE WITH HYPOXIA AND HYPERCAPNIA (HCC): ICD-10-CM

## 2017-11-09 DIAGNOSIS — J20.9 ACUTE BRONCHITIS WITH CHRONIC OBSTRUCTIVE PULMONARY DISEASE (COPD) (HCC): Primary | ICD-10-CM

## 2017-11-09 DIAGNOSIS — E08.00 DIABETES MELLITUS DUE TO UNDERLYING CONDITION WITH HYPEROSMOLARITY WITHOUT COMA, UNSPECIFIED LONG TERM INSULIN USE STATUS: ICD-10-CM

## 2017-11-09 DIAGNOSIS — J44.0 ACUTE BRONCHITIS WITH CHRONIC OBSTRUCTIVE PULMONARY DISEASE (COPD) (HCC): Primary | ICD-10-CM

## 2017-11-09 DIAGNOSIS — I10 ESSENTIAL HYPERTENSION: ICD-10-CM

## 2017-11-09 DIAGNOSIS — E66.9 OBESITY (BMI 35.0-39.9 WITHOUT COMORBIDITY): ICD-10-CM

## 2017-11-09 DIAGNOSIS — J96.01 ACUTE RESPIRATORY FAILURE WITH HYPOXIA AND HYPERCAPNIA (HCC): ICD-10-CM

## 2017-11-09 DIAGNOSIS — G47.33 OSA TREATED WITH BIPAP: ICD-10-CM

## 2017-11-09 PROCEDURE — G8427 DOCREV CUR MEDS BY ELIG CLIN: HCPCS | Performed by: INTERNAL MEDICINE

## 2017-11-09 PROCEDURE — G8484 FLU IMMUNIZE NO ADMIN: HCPCS | Performed by: INTERNAL MEDICINE

## 2017-11-09 PROCEDURE — G8598 ASA/ANTIPLAT THER USED: HCPCS | Performed by: INTERNAL MEDICINE

## 2017-11-09 PROCEDURE — 99214 OFFICE O/P EST MOD 30 MIN: CPT | Performed by: INTERNAL MEDICINE

## 2017-11-09 PROCEDURE — G8417 CALC BMI ABV UP PARAM F/U: HCPCS | Performed by: INTERNAL MEDICINE

## 2017-11-09 PROCEDURE — 1111F DSCHRG MED/CURRENT MED MERGE: CPT | Performed by: INTERNAL MEDICINE

## 2017-11-09 PROCEDURE — 1036F TOBACCO NON-USER: CPT | Performed by: INTERNAL MEDICINE

## 2017-11-09 PROCEDURE — 3023F SPIROM DOC REV: CPT | Performed by: INTERNAL MEDICINE

## 2017-11-09 PROCEDURE — G8926 SPIRO NO PERF OR DOC: HCPCS | Performed by: INTERNAL MEDICINE

## 2017-11-09 PROCEDURE — 3017F COLORECTAL CA SCREEN DOC REV: CPT | Performed by: INTERNAL MEDICINE

## 2017-11-09 RX ORDER — ALPRAZOLAM 0.25 MG/1
0.25 TABLET ORAL 2 TIMES DAILY
Qty: 60 TABLET | Refills: 1 | Status: SHIPPED | OUTPATIENT
Start: 2017-11-09 | End: 2018-03-13 | Stop reason: SDUPTHER

## 2017-11-09 ASSESSMENT — SLEEP AND FATIGUE QUESTIONNAIRES
HOW LIKELY ARE YOU TO NOD OFF OR FALL ASLEEP WHILE SITTING AND TALKING TO SOMEONE: 0
HOW LIKELY ARE YOU TO NOD OFF OR FALL ASLEEP WHILE LYING DOWN TO REST IN THE AFTERNOON WHEN CIRCUMSTANCES PERMIT: 2
HOW LIKELY ARE YOU TO NOD OFF OR FALL ASLEEP WHILE WATCHING TV: 3
HOW LIKELY ARE YOU TO NOD OFF OR FALL ASLEEP WHEN YOU ARE A PASSENGER IN A CAR FOR AN HOUR WITHOUT A BREAK: 0
HOW LIKELY ARE YOU TO NOD OFF OR FALL ASLEEP WHILE SITTING QUIETLY AFTER LUNCH WITHOUT ALCOHOL: 1
HOW LIKELY ARE YOU TO NOD OFF OR FALL ASLEEP IN A CAR, WHILE STOPPED FOR A FEW MINUTES IN TRAFFIC: 0
ESS TOTAL SCORE: 9
HOW LIKELY ARE YOU TO NOD OFF OR FALL ASLEEP WHILE SITTING INACTIVE IN A PUBLIC PLACE: 2
HOW LIKELY ARE YOU TO NOD OFF OR FALL ASLEEP WHILE SITTING AND READING: 1

## 2017-11-09 NOTE — PROGRESS NOTES
Marquez Alcala  11/9/2017      Marquez Alcala presented today for follow-up off chronic obstructive lung disease due to chronic bronchitis and emphysema. He has he has chronic obstructive sleep apnea as well as he is being treated with BiPAP. He is having problem using the BiPAP because of anxiety. He was started on Xanax which has greatly helped him to tolerate the BiPAP. Patient now complains that the BiPAP pressures were too high. He had gone to the [facility] but they have refused to lower the pressures. I did lower the pressure of BiPAP from 25 x 15-20 white 12. Hopefully this will be more tolerable along with the use of Xanax. Denies any nasal stuffiness. He has noted that since she is using the BiPAP his daytime symptoms are significantly improved. There is no evidence of an acute exacerbation of COPD there is no excessive cough or sputum production. His color is sputum is unchanged volume is unchanged. There is no wheezing. No fever, chills are regular. Normal, no nasal nasal stuffiness. No sinusitis. Denies any also esophageal reflux disease. He continued to be morbidly obese and has not been able to lose much weight. He has systemic hypertension which is well controlled with the present regimen. As the diabetes. Is not on home oxygen. And upper sleepiness scale given to the patient the office revealed a score of 9, indicating lack of daytime sleepiness.     Sleep Medicine 11/9/2017 9/7/2017   Sitting and reading 1 0   Watching TV 3 1   Sitting, inactive in a public place (e.g. a theatre or a meeting) 2 1   As a passenger in a car for an hour without a break 0 1   Lying down to rest in the afternoon when circumstances permit 2 2   Sitting and talking to someone 0 1   Sitting quietly after a lunch without alcohol 1 2   In a car, while stopped for a few minutes in traffic 0 0   Total score 9 8   Neck circumference - 57       Immunization   Immunization History   Administered Date(s) Administered    Influenza Virus Vaccine 12/16/2013, 10/23/2014, 10/12/2015    Influenza, Nancie Cranker, 3 yrs and older, IM, Preservative Free 10/11/2016, 11/04/2017    Pneumococcal Polysaccharide ([id number]) 05/23/2013        Pneumococcal Vaccine     [x] Up to date    [] Indicated   [] Refused  [] Contraindicated       Influenza Vaccine   [x] Up to date    [] Indicated   [] Refused  [] Contraindicated       REVIEW OF SYSTEMS: Review is assistant were conductive for all other system including upper and lower extremities and no additional information was obtained. Review of Systems -  General ROS: negative for - chills, fatigue, fever or weight loss  ENT ROS: negative for - headaches, oral lesions or sore throat  Cardiovascular ROS: no chest pain , orthopnea or pnd   Gastrointestinal ROS: no abdominal pain, change in bowel habits, or black or bloody stools  Skin - no rash   Neuro - no blurry vision , no loc . No focal weakness   msk - no jt tenderness or swelling    Vascular - no claudication , rest completed and negative   Lymphatic - complete and negative   Hematology - oncology - complete and negative   Allergy immunology - complete and negative    no burning or hematuria   Upper Extremities  Lower Extremities      Current Outpatient Prescriptions   Medication Sig Dispense Refill    ALPRAZolam (XANAX) 0.25 MG tablet Take 1 tablet by mouth 2 times daily . 60 tablet 1    doxycycline monohydrate (MONODOX) 100 MG capsule Take 1 capsule by mouth every 12 hours for 10 days 20 capsule 0    metolazone (ZAROXOLYN) 2.5 MG tablet Take 1 tablet by mouth once a week On tuesday 12 tablet 0    lisinopril (PRINIVIL;ZESTRIL) 5 MG tablet Take 1 tablet by mouth daily . Discontinued Lisinopril  10 mg 90 tablet 3    venlafaxine (EFFEXOR) 75 MG tablet TAKE ONE TABLET BY MOUTH TWICE A DAY *CALL FOR REFILL OR DOSE INCREASE IF TOLERATED.  STOP TAKING PROZAC* 60 tablet 5    oxyCODONE HCl (OXY-IR) 10 MG immediate release tablet Take 1 Take 1 tablet by mouth daily 90 tablet 3    clopidogrel (PLAVIX) 75 MG tablet TAKE ONE TABLET BY MOUTH DAILY 90 tablet 3    fluticasone (FLONASE) 50 MCG/ACT nasal spray 2 sprays by Nasal route daily 1 Bottle 3    Melatonin 10 MG TABS Take 10 mg by mouth nightly as needed (insomnia) 90 tablet 1    ONE TOUCH ULTRA TEST strip TWO TIMES A  strip 4    albuterol (PROVENTIL) (2.5 MG/3ML) 0.083% nebulizer solution Take 3 mLs by nebulization every 6 hours as needed for Wheezing or Shortness of Breath 100 each 3    COMFORT ASSIST INSULIN SYRINGE 31G X 5/16\" 1 ML MISC       nitroGLYCERIN (NITROSTAT) 0.4 MG SL tablet Place 1 tablet under the tongue every 5 minutes as needed for Chest pain 25 tablet 3    insulin regular human 500 UNIT/ML injection Inject 30 Units into the skin 3 times daily (before meals) **U-500** draw back to 0.2 on a U-100 1mL syringe (= 100 units of U-500) at each dose       aspirin 81 MG tablet Take 81 mg by mouth daily. No current facility-administered medications for this visit. Allergies   Allergen Reactions    Gabapentin      Worsening CKD    Levaquin [Levofloxacin]     Lorazepam      Falls      Nsaids      CHF!     Levofloxacin Nausea And Vomiting       Immunization History   Administered Date(s) Administered    Influenza Virus Vaccine 12/16/2013, 10/23/2014, 10/12/2015    Influenza, Ray Melgar, 3 yrs and older, IM, Preservative Free 10/11/2016, 11/04/2017    Pneumococcal Polysaccharide ([id number]) 05/23/2013        PAST MEDICAL HISTORY:         Diagnosis Date    Acute on chronic kidney failure ([address] 7/20/2017    Anxiety 10/2/2016    Arthropathy, unspecified, other specified sites 6/13/2013    Asthma     Bilateral lower leg cellulitis 12/24/2016    Bilateral lower leg cellulitis 7/5/2017    CAD (coronary artery disease)     Cellulitis of both lower extremities 5/25/2017    CHF (congestive heart failure), NYHA class III ([address] 8/14/2013    Chronic back pain     Chronic headaches     was referred to neuro, testing scheduled    COPD (chronic obstructive pulmonary disease) ([address]     Diabetic neuropathy ([address] 2013    Displacement of lumbar intervertebral disc without myelopathy 2013    Ear infection     RIGHT    Facial cellulitis 2012    Fall 3/25/2017    GERD (gastroesophageal reflux disease)     Head injury     Hearing loss in right ear     pencil pierced ear as a child    Hepatic steatosis 12/3/2015    History of rib fracture 12/3/2015    Chronic     Hyperlipidemia     Hypertension     Insomnia     Mastoiditis of left side     Mixed conductive and sensorineural hearing loss of both ears 1/10/2017    Per ENT    Morbid obesity with BMI of 45.0-49.9, adult ([address] 2015    Obesity     BARBY on CPAP     Otitis externa of left ear     Pancreatitis chronic     Renal insufficiency     proteinuria    Severe depression ([address] 2013    Syncope 2017    Tinnitus of both ears 1/10/2017    Per ENT    Type 2 diabetes mellitus with stage 3 chronic kidney disease, with long-term current use of insulin ([address] 2016    Type II or unspecified type diabetes mellitus without mention of complication, not stated as uncontrolled     uncontrolled       FAMILY HISTORY:       Problem Relation Age of Onset    Heart Disease Mother       age 64 from [facility] High Blood Pressure Mother     Diabetes Mother     High Blood Pressure Father       age 80 from CKD and Lung Fibrosis    Kidney Disease Father     Heart Disease Sister     Heart Attack Sister     Obesity Sister     Diabetes Sister        SURGICAL HISTORY:   Past Surgical History:   Procedure Laterality Date    BACK SURGERY   (x 4) ,.2011.2012     Dr Parth Pelaez last 2 surg    COLONOSCOPY  11/3/2015    hemorrhoids, poor prep    CORONARY ANGIOPLASTY WITH STENT PLACEMENT  March 2013    x 1    HAND TENDON SURGERY Left     thumb tendon repair    KNEE ARTHROSCOPY Left     m)   Wt (!) 374 lb (169.6 kg)   SpO2 99%   BMI 50.71 kg/m²     Results for orders placed or performed during the hospital encounter of 11/02/17   Culture Blood #1   Result Value Ref Range    Specimen Description       . BLOOD Performed at [facility]: TYESHA ADRIAN [address] [place], [place]    Specimen Description  [id number] [phone]     Special Requests       LEFT ARM RED 0ML PURPLE 2ML Performed at [facility]: TYESHA ADRIAN [address] [place]    Special Requests  [place] [phone]     Culture NO GROWTH 6 DAYS     Culture       Performed at [address] [phone]    Status FINAL 11/08/2017    Culture Blood #1   Result Value Ref Range    Specimen Description       . BLOOD RT AC 1ML EACH Performed at [facility]: TYESHA VIDALMARIANN [address]    Specimen Description  [place]. [address] [phone]     Special Requests NOT REPORTED     Culture NO GROWTH 6 DAYS     Culture       Performed at [address] [phone]    Status FINAL 11/08/2017    Wound Culture   Result Value Ref Range    Specimen Description       . SKIN RIGHT LOWER LEG Performed at [facility]: TYESHA SCOTT KAYLAH [address]    Specimen Description  [place]. [address] [phone]     Special Requests NOT REPORTED     Direct Exam MANY NEUTROPHILS (A)     Direct Exam FEW GRAM POSITIVE COCCI IN CLUSTERS (A)     Culture (A)      STAPHYLOCOCCUS AUREUS MODERATE GROWTH This isolate is methicillin susceptible.     Culture       Performed at [address] [place] [address] [phone]    Status FINAL 11/06/2017     Organism SAUR        Susceptibility    Staphylococcus aureus - ASIA     penicillin >=0.5 RESISTANT Resistant      cefoxitin screen NOT REPORTED       ciprofloxacin NOT REPORTED       clindamycin <=0.25 SUSCEPTIBLE Sensitive      erythromycin 0.5 SUSCEPTIBLE Sensitive      gentamicin <=0.5 SUSCEPTIBLE Sensitive      Induced Clind Resist NOT REPORTED       levofloxacin 0.25 SUSCEPTIBLE Sensitive      linezolid NOT REPORTED       moxifloxacin NOT REPORTED       nitrofurantoin NOT REPORTED       oxacillin 0.5 SUSCEPTIBLE Sensitive      Synercid NOT REPORTED       rifampin NOT REPORTED       tetracycline <=1 SUSCEPTIBLE Sensitive      tigecycline NOT REPORTED       trimethoprim-sulfamethoxazole <=10 SUSCEPTIBLE Sensitive      vancomycin NOT REPORTED     Basic Metabolic Panel   Result Value Ref Range    Glucose 211 (H) 70 - 99 mg/dL    BUN 28 (H) 6 - 20 mg/dL    CREATININE 1.43 (H) 0.70 - 1.20 mg/dL    Bun/Cre Ratio NOT REPORTED 9 - 20    Calcium 9.3 8.6 - 10.4 mg/dL    Sodium 137 135 - 144 mmol/L    Potassium 4.1 3.7 - 5.3 mmol/L    Chloride 96 (L) 98 - 107 mmol/L    CO2 26 20 - 31 mmol/L    Anion Gap 15 9 - 17 mmol/L    GFR Non-African American 52 (L) >60 mL/min    GFR African American >60 >60 mL/min    GFR Comment          GFR Staging NOT REPORTED    Brain Natriuretic Peptide   Result Value Ref Range    Pro-BNP 73 <300 pg/mL    BNP Interpretation         CBC Auto Differential   Result Value Ref Range    WBC 9.7 3.5 - 11.0 k/uL    RBC 4.48 (L) 4.5 - 5.9 m/uL    Hemoglobin 12.6 (L) 13.5 - 17.5 g/dL    Hematocrit 38.1 (L) 41 - 53 %    MCV 85.1 80 - 100 fL    MCH 28.2 26 - 34 pg    MCHC 33.1 31 - 37 g/dL    RDW 16.5 (H) 11.5 - 14.9 %    Platelets 956 138 - 675 k/uL    MPV 9.3 6.0 - 12.0 fL    Differential Type NOT REPORTED     Seg Neutrophils 77 %    Lymphocytes 13 %    Monocytes 7 %    Eosinophils % 2 %    Basophils 1 %    Immature Granulocytes NOT REPORTED 0 %    Segs Absolute 7.40 1.3 - 9.1 k/uL    Absolute Lymph # 1.20 1.0 - 4.8 k/uL    Absolute Mono # 0.70 0.1 - 1.3 k/uL    Absolute Eos # 0.20 0.0 - 0.4 k/uL    Basophils # 0.10 0.0 - 0.2 k/uL    Absolute Immature Granulocyte NOT REPORTED 0.00 - 0.30 k/uL    WBC Morphology NOT REPORTED     RBC Morphology NOT REPORTED     Platelet Estimate NOT REPORTED

## 2017-11-09 NOTE — PROGRESS NOTES
Date Patient Discharged:11/04/2017      Today's Date: 11/06/2017      Spoke with: Patient      Do you understand the purpose of your medications?:  yes    Do you understand the side effects of your new medications?:  yes    Would you like to speak with a pharmacist about any of your medications or the side effects?:  Already done    Were you able to get your prescriptions filled?:  yes    Did you understand your discharge instructions and what you are responsible for in managing your health after discharge?:  yes    While you were here, was your call light answered promptly?:  yes    Was the area around your room kept quiet at night?:  yes    Were your room and bathroom kept clean?:  yes

## 2017-11-13 ENCOUNTER — TELEPHONE (OUTPATIENT)
Dept: FAMILY MEDICINE CLINIC | Age: 52
End: 2017-11-13

## 2017-11-14 NOTE — TELEPHONE ENCOUNTER
Please let pt know    \"Miguel,  I received a letter from your insurance that you are not using inhalers, specifically Advair and Spiriva.  If you need any refills please let me know. If you have any questions, please let me know.     Fausto Devi MD  [address] [place] [address] [facility]  [address] [address]  Dept: [phone]  Dept Fax: [phone]\"

## 2017-11-15 ENCOUNTER — OFFICE VISIT (OUTPATIENT)
Dept: FAMILY MEDICINE CLINIC | Age: 52
End: 2017-11-15
Payer: COMMERCIAL

## 2017-11-15 VITALS
SYSTOLIC BLOOD PRESSURE: 123 MMHG | BODY MASS INDEX: 42.66 KG/M2 | WEIGHT: 315 LBS | DIASTOLIC BLOOD PRESSURE: 61 MMHG | HEART RATE: 91 BPM | TEMPERATURE: 97.6 F | HEIGHT: 72 IN | OXYGEN SATURATION: 97 %

## 2017-11-15 DIAGNOSIS — G47.33 OSA TREATED WITH BIPAP: ICD-10-CM

## 2017-11-15 DIAGNOSIS — G47.01 INSOMNIA DUE TO MEDICAL CONDITION: ICD-10-CM

## 2017-11-15 DIAGNOSIS — Z79.4 TYPE 2 DIABETES MELLITUS WITH DIABETIC NEUROPATHY, WITH LONG-TERM CURRENT USE OF INSULIN (HCC): ICD-10-CM

## 2017-11-15 DIAGNOSIS — E11.40 TYPE 2 DIABETES MELLITUS WITH DIABETIC NEUROPATHY, WITH LONG-TERM CURRENT USE OF INSULIN (HCC): ICD-10-CM

## 2017-11-15 DIAGNOSIS — D64.9 ANEMIA, UNSPECIFIED TYPE: ICD-10-CM

## 2017-11-15 DIAGNOSIS — L03.116 BILATERAL LOWER LEG CELLULITIS: Primary | ICD-10-CM

## 2017-11-15 DIAGNOSIS — J44.9 MIXED TYPE COPD (CHRONIC OBSTRUCTIVE PULMONARY DISEASE) (HCC): ICD-10-CM

## 2017-11-15 DIAGNOSIS — I50.32 CHRONIC DIASTOLIC CONGESTIVE HEART FAILURE (HCC): ICD-10-CM

## 2017-11-15 DIAGNOSIS — R60.0 BILATERAL LEG EDEMA: ICD-10-CM

## 2017-11-15 DIAGNOSIS — G89.29 CHRONIC MIDLINE LOW BACK PAIN WITH LEFT-SIDED SCIATICA: ICD-10-CM

## 2017-11-15 DIAGNOSIS — L03.115 BILATERAL LOWER LEG CELLULITIS: Primary | ICD-10-CM

## 2017-11-15 DIAGNOSIS — M54.42 CHRONIC MIDLINE LOW BACK PAIN WITH LEFT-SIDED SCIATICA: ICD-10-CM

## 2017-11-15 PROBLEM — Z99.89 OSA ON CPAP: Status: RESOLVED | Noted: 2017-07-20 | Resolved: 2017-11-15

## 2017-11-15 LAB — HBA1C MFR BLD: 7.6 %

## 2017-11-15 PROCEDURE — 83036 HEMOGLOBIN GLYCOSYLATED A1C: CPT | Performed by: FAMILY MEDICINE

## 2017-11-15 PROCEDURE — 99495 TRANSJ CARE MGMT MOD F2F 14D: CPT | Performed by: FAMILY MEDICINE

## 2017-11-15 PROCEDURE — 1111F DSCHRG MED/CURRENT MED MERGE: CPT | Performed by: FAMILY MEDICINE

## 2017-11-15 RX ORDER — OXYCODONE HYDROCHLORIDE 10 MG/1
10 TABLET ORAL EVERY 8 HOURS PRN
Qty: 90 TABLET | Refills: 0 | Status: SHIPPED | OUTPATIENT
Start: 2017-11-15 | End: 2017-12-14 | Stop reason: SDUPTHER

## 2017-11-15 ASSESSMENT — ENCOUNTER SYMPTOMS
CHEST TIGHTNESS: 0
SHORTNESS OF BREATH: 1
NAUSEA: 0
CONSTIPATION: 0
ABDOMINAL DISTENTION: 0
DIARRHEA: 0
ABDOMINAL PAIN: 0
COUGH: 1
APNEA: 1
VOMITING: 0
WHEEZING: 0
BACK PAIN: 1

## 2017-11-15 NOTE — PROGRESS NOTES
DIABETES and HYPERTENSION visit    BP Readings from Last 3 Encounters:   11/15/17 123/61   11/09/17 (!) 144/75   11/04/17 (!) 155/59        Hemoglobin A1C (%)   Date Value   08/16/2017 8.6   08/16/2017 8.6   07/20/2017 8.6     Microalb/Crt. Ratio (mcg/mg creat)   Date Value   08/16/2017 3     LDL Cholesterol (mg/dL)   Date Value   08/16/2017 89     HDL   Date Value   08/16/2017 27 mg/dL (L)   07/01/2014 24 mg/dl     BUN (mg/dL)   Date Value   11/03/2017 30 (H)     CREATININE (mg/dL)   Date Value   11/03/2017 1.56 (H)     Glucose (mg/dL)   Date Value   11/03/2017 307 (H)            Have you changed or started any medications since your last visit including any over-the-counter medicines, vitamins, or herbal medicines? no   Have you stopped taking any of your medications? Is so, why? -  no  Are you having any side effects from any of your medications? - no    Have you seen any other physician or provider since your last visit? yes - renal   Have you had any other diagnostic tests since your last visit? yes    Have you been seen in the emergency room and/or had an admission in a hospital since we last saw you?  yes - [facility] admit   Have you had your routine dental cleaning in the past 6 months?  no     Have you had your annual diabetic retinal (eye) exam? Yes   (ensure copy of exam is in the chart)    Do you have an active MyChart account? If no, what is the barrier?   Yes    Patient Care Team:  Flora Mcrae MD as PCP - General (Family Medicine)  Precious Oates MD as Consulting Physician (Otolaryngology)  Benson Finley MD as Consulting Physician (Endocrinology)  Emma Marvin MD as Consulting Physician (Gastroenterology)  Sabrina Lozano DO as Consulting Physician (Cardiology)  Debora Rain DPM as Surgeon (Podiatry)  Beatriz Hoffman MD as Consulting Physician (Ophthalmology)  Lynda Foster MD as Consulting Physician (Nephrology)  Tabby Echavarria MD as Consulting Physician (Pulmonology)  Kaci Pope

## 2017-11-15 NOTE — PATIENT INSTRUCTIONS
bicarbonate (baking soda). MSG is often added to Asian food. You can sometimes ask for food without MSG or salt. Buy low-sodium foods  · Buy foods that are labeled \"unsalted\" (no salt added), \"sodium-free\" (less than 5 mg of sodium per serving), or \"low-sodium\" (less than 140 mg of sodium per serving). A food labeled \"light sodium\" has less than half of the full-sodium version of that food. Foods labeled \"reduced-sodium\" may still have too much sodium. · Buy fresh vegetables or plain, frozen vegetables. Buy low-sodium versions of canned vegetables, soups, and other canned goods. Prepare low-sodium meals  · Use less salt each day when cooking. Reducing salt in this way will help you adjust to the taste. Do not add salt after cooking. Take the salt shaker off the table. · Flavor your food with garlic, lemon juice, onion, vinegar, herbs, and spices instead of salt. Do not use soy sauce, steak sauce, onion salt, garlic salt, mustard, or ketchup on your food. · Make your own salad dressings, sauces, and ketchup without adding salt. · Use less salt (or none) when recipes call for it. You can often use half the salt a recipe calls for without losing flavor. Other dishes like rice, pasta, and grains do not need added salt. · Rinse canned vegetables. This removes somebut not allof the salt. · Avoid water that has a naturally high sodium content or that has been treated with water softeners, which add sodium. Call your local water company to find out the sodium content of your water supply. If you buy bottled water, read the label and choose a sodium-free brand. Avoid high-sodium foods, such as:  · Smoked, cured, salted, and canned meat, fish, and poultry. · Ham, villegas, hot dogs, and luncheon meats. · Regular, hard, and processed cheese and regular peanut butter. · Crackers with salted tops. · Frozen prepared meals.   · Canned and dried soups, broths, and bouillon, unless labeled sodium-free or low-sodium. · Canned vegetables, unless labeled sodium-free or low-sodium. · Salted snack foods such as chips and pretzels. · Western Kizzy fries, pizza, tacos, and other fast foods. · Pickles, olives, ketchup, and other condiments, especially soy sauce, unless labeled sodium-free or low-sodium. If you cannot cook for yourself  · Have family members or friends help you, or have someone cook low-sodium meals. · Check with your local senior nutrition program to find out where meals are served and whether they offer a low-sodium option. You can often find these programs through your local health department or hospital.  · Have meals delivered to your home. Most [facility] have a Meals on [employer] These programs provide one hot meal a day for older adults, delivered to their homes. Ask whether these meals are low-sodium. Let them know that you are on a low-sodium diet. Limiting fluid intake  · Find a method that works for you. You might simply write down how much you drink every time you do. Some people keep a container filled with the amount of fluid allowed for that day. If they drink from a source other than the container, then they pour out that amount. · Measure your regular drinking glasses to find out how much fluid each one holds. Once you know this, you will not have to measure every time. · Besides water, milk, juices, and other drinks, some foods have a lot of fluid. Count any foods that will melt (such as ice cream or gelatin dessert) or liquid foods (such as soup) as part of your fluid intake for the day. Where can you learn more? Go to https://иван.VisiKard. org and sign in to your Oration account. Enter A166 in the Webflakes box to learn more about \"Limiting Sodium and Fluids With Heart Failure: Care Instructions. \"     If you do not have an account, please click on the \"Sign Up Now\" link.   Current as of: November 15, 2016  Content Version: 11.3  © 8703-9784 Healthwise, Incorporated. Care instructions adapted under license by [facility] [address] If you have questions about a medical condition or this instruction, always ask your healthcare professional. [place] [address] any warranty or liability for your use of this information. Patient Education        Cellulitis: Care Instructions  Your Care Instructions    Cellulitis is a skin infection. It often occurs after a break in the skin from a scrape, cut, bite, or puncture, or after a rash. The doctor has checked you carefully, but problems can develop later. If you notice any problems or new symptoms, get medical treatment right away. Follow-up care is a key part of your treatment and safety. Be sure to make and go to all appointments, and call your doctor if you are having problems. It's also a good idea to know your test results and keep a list of the medicines you take. How can you care for yourself at home? · Take your antibiotics as directed. Do not stop taking them just because you feel better. You need to take the full course of antibiotics. · Prop up the infected area on pillows to reduce pain and swelling. Try to keep the area above the level of your heart as often as you can. · If your doctor told you how to care for your wound, follow your doctor's instructions. If you did not get instructions, follow this general advice:  ¨ Wash the wound with clean water 2 times a day. Don't use hydrogen peroxide or alcohol, which can slow healing. ¨ You may cover the wound with a thin layer of petroleum jelly, such as Vaseline, and a nonstick bandage. ¨ Apply more petroleum jelly and replace the bandage as needed. · Be safe with medicines. Take pain medicines exactly as directed. ¨ If the doctor gave you a prescription medicine for pain, take it as prescribed. ¨ If you are not taking a prescription pain medicine, ask your doctor if you can take an over-the-counter medicine.   To prevent cellulitis in the future  · Try to prevent cuts, scrapes, or other injuries to your skin. Cellulitis most often occurs where there is a break in the skin. · If you get a scrape, cut, mild burn, or bite, wash the wound with clean water as soon as you can to help avoid infection. Don't use hydrogen peroxide or alcohol, which can slow healing. · If you have swelling in your legs (edema), support stockings and good skin care may help prevent leg sores and cellulitis. · Take care of your feet, especially if you have diabetes or other conditions that increase the risk of infection. Wear shoes and socks. Do not go barefoot. If you have athlete's foot or other skin problems on your feet, talk to your doctor about how to treat them. When should you call for help? Call your doctor now or seek immediate medical care if:  · You have signs that your infection is getting worse, such as:  ¨ Increased pain, swelling, warmth, or redness. ¨ Red streaks leading from the area. ¨ Pus draining from the area. ¨ A fever. · You get a rash. Watch closely for changes in your health, and be sure to contact your doctor if:  · You are not getting better after 1 day (24 hours). · You do not get better as expected. Where can you learn more? Go to https://Nimsoft.Wangluotianxia. org and sign in to your [employer] account. Enter S260 in the [facility] box to learn more about \"Cellulitis: Care Instructions. \"     If you do not have an account, please click on the \"Sign Up Now\" link. Current as of: October 13, 2016  Content Version: 11.3  © 1403-9533 [employer], Incorporated. Care instructions adapted under license by [facility] ([facility]). If you have questions about a medical condition or this instruction, always ask your healthcare professional. [address] any warranty or liability for your use of this information.

## 2017-11-15 NOTE — PROGRESS NOTES
Addressed during office visit today, A1c abnormal, improve diabetes, we'll fax report to Dr. Kamlesh Maldonado, continue treatment recommended during the office visit

## 2017-11-15 NOTE — PROGRESS NOTES
Post-Discharge Transitional Care Management Services      [address]   YOB: 1965    Date of Office Visit:  11/15/2017  Date of Hospital Admission: 11/2/17  Date of Hospital Discharge: 11/4/17  [facility] Risk Score [risk of hospital readmission >=10  medium risk (chance of readmission ~ 12%) >14  high risk (chance of readmission ~18%)]:Risk Score: 13.75    Care management risk score Rising risk (score 2-5) and Complex Care (Scores >=6): No Risk Score On File      Telephone posthospital encounter for care coordination-done on 11/7/17      Patient Active Problem List   Diagnosis    DDD (degenerative disc disease)    Hypertriglyceridemia    CKD (chronic kidney disease) stage 3, GFR 30-59 ml/min    CAD (coronary artery disease) s/p 1 stent    Mixed type COPD (chronic obstructive pulmonary disease) [facility] [address]    Type 2 diabetes mellitus with diabetic neuropathy, with long-term current use of insulin (HCC)    Chronic pancreatitis ([facility] [address]    Displacement of lumbar intervertebral disc without myelopathy    Obesity, morbid (more than 100 lbs over ideal weight or BMI > 40) (HCC)    Chronic diastolic congestive heart failure (HCC)    Peripheral neuropathy (HCC)    Insomnia    BPH (benign prostatic hyperplasia)    Smoker    Class 3 obesity due to excess calories with body mass index (BMI) of 50.0 to 59.9 in [facility])    Essential hypertension    Hepatic steatosis    History of rib fracture    Umbilical hernia    Left inguinal hernia    Adrenal gland anomaly    Lumbar disc narrowing    Stenosis, spinal, lumbar    Chronic back pain greater than 3 months duration    Gastroesophageal reflux disease without esophagitis    Hyperlipidemia with target LDL less than 70    Bilateral lower leg cellulitis    Lower urinary tract symptoms (LUTS)    Vitamin D deficiency    Iron deficiency anemia due to chronic blood loss    BARBY treated with BiPAP    Chronic midline low back pain with left-sided legs. 567 g 2    PROAIR  (90 Base) MCG/ACT inhaler INHALE TWO PUFFS BY MOUTH EVERY 6 HOURS AS NEEDED FOR WHEEZING OR FOR SHORTNESS OF BREATH 18 g 5    tiZANidine (ZANAFLEX) 4 MG tablet TAKE ONE TABLET BY MOUTH EVERY 8 HOURS AS NEEDED FOR BACK PAIN 90 tablet 5    miconazole (MICOTIN) 2 % powder Apply topically 2 times daily axillary for rash 85 g 3    docusate sodium (COLACE) 100 MG capsule Take 1 capsule by mouth 2 times daily as needed for Constipation 60 capsule 3    metoprolol tartrate (LOPRESSOR) 50 MG tablet Take 1 tablet by mouth 2 times daily 60 tablet 3    bumetanide (BUMEX) 1 MG tablet Take 3 tablets by mouth 2 times daily (Patient taking differently: Take 3 mg by mouth 2 times daily 3 mg am 2 mg pm) 180 tablet 1    mupirocin (BACTROBAN) 2 % ointment Apply topically 3 times daily on the affected area 1 Tube 0    SPIRIVA HANDIHALER 18 MCG inhalation capsule INHALE THE ENTIRE CONTENTS OF 1 CAPSULE ONCE A DAY USING HANDIHALER DEVICE 60 capsule 11    ADVAIR -21 MCG/ACT inhaler INHALE TWO PUFFS BY MOUTH TWICE A DAY 12 g 11    ONE TOUCH ULTRASOFT LANCETS MISC USE ONE LANCET TO TEST THREE TIMES A  each 11    pantoprazole (PROTONIX) 40 MG tablet Take 1 tablet by mouth nightly 90 tablet 3    pravastatin (PRAVACHOL) 40 MG tablet Take 1 tablet by mouth every evening STOP ATORVASTATIN 90 tablet 3    fluticasone (CUTIVATE) 0.05 % cream       lidocaine (LMX) 4 % cream Apply topically daily as needed.  76.5 g 5    spironolactone (ALDACTONE) 50 MG tablet Take 1 tablet by mouth daily 90 tablet 3    Ferrous Sulfate (IRON) 325 (65 FE) MG TABS Take 1 tablet by mouth daily 90 tablet 3    vitamin D (CHOLECALCIFEROL) 1000 UNIT TABS tablet Take 1 tablet by mouth daily 90 tablet 3    clopidogrel (PLAVIX) 75 MG tablet TAKE ONE TABLET BY MOUTH DAILY 90 tablet 3    fluticasone (FLONASE) 50 MCG/ACT nasal spray 2 sprays by Nasal route daily 1 Bottle 3    Melatonin 10 MG TABS Take 10 mg by mouth nightly as needed (insomnia) 90 tablet 1    ONE TOUCH ULTRA TEST strip TWO TIMES A  strip 4    albuterol (PROVENTIL) (2.5 MG/3ML) 0.083% nebulizer solution Take 3 mLs by nebulization every 6 hours as needed for Wheezing or Shortness of Breath 100 each 3    COMFORT ASSIST INSULIN SYRINGE 31G X 5/16\" 1 ML MISC       nitroGLYCERIN (NITROSTAT) 0.4 MG SL tablet Place 1 tablet under the tongue every 5 minutes as needed for Chest pain 25 tablet 3    insulin regular human 500 UNIT/ML injection Inject 30 Units into the skin 3 times daily (before meals) **U-500** draw back to 0.2 on a U-100 1mL syringe (= 100 units of U-500) at each dose       aspirin 81 MG tablet Take 81 mg by mouth daily. Medications patient taking as of now reconciled against medications ordered at time of hospital discharge -YES    Vital signs within normal limits except for morbid obesity and unintentional weight loss  Vitals:    11/15/17 1356   BP: 123/61   Pulse: 91   Temp: 97.6 °F (36.4 °C)   TempSrc: Tympanic   SpO2: 97%   Weight: (!) 369 lb 12.8 oz (167.7 kg)   Height: 6' (1.829 m)     Body mass index is 50.15 kg/m². Morbid obesity per BMI. There is unintentional weight loss, likely related to leg edema improving. Wt Readings from Last 3 Encounters:   11/15/17 (!) 369 lb 12.8 oz (167.7 kg)   11/09/17 (!) 374 lb (169.6 kg)   11/03/17 (!) 373 lb 10.9 oz (169.5 kg)     BP Readings from Last 3 Encounters:   11/15/17 123/61   11/09/17 (!) 144/75   11/04/17 (!) 155/59        Inpatient course: Discharge summary reviewed- see chart. Chief Complaint   Patient presents with    Follow-Up from Hospital    Cellulitis     lower legs       HPI     Patient was admitted 11/2/17 through 11/4/17 due to cellulitis bilaterally due to open blisters on his shins and chronic leg edema. Apparently, he was sent to emergency room by lymphedema clinic. Received Vanco and Zosyn per records in [facility] [address]   Patient was discharged on doxycycline for 10 about this. I discussed with him the results from the recent CT and I explained to him he has chronic bronchitis. He finally admits that he doesn't use the nebulizer machine more than twice a week. We discussed for him to use the nebulizer machine every day every 6 hours as needed, or at least 2 times a day. Cough is the same and he doesn't drink too much sputum. He completely quit smoking and he has been abstaining from smoking, praise given. Patient tells me he saw a podiatrist.  He has history of onychomycosis, and he says he still has it even if he was given Lamisil. Says that his foot doctor wanted to remove all his nails,  but he refused  He is awaiting for diabetic shoes. Has known CHF, HTN. Patient has the following cardiac symptoms: Dyspnea, fatigue, lower extremity edema, orthopnea, paroxysmal nocturnal dyspnea. Patient has the following target organ damage. Has known CAD with 1 stent, heart failure, CKD stage III, prior MI, LVH. Has appointment with cardiologist November 7. He follows with CHF clinic. Does not have home care. Has wheelchair, cane, shower chair, hospital bed and walker. He doesn't want home care. He lives with his wife who helps him. Has known type 2 diabetes, A1c has improved today from 8.6 before, down to 7.6. He is testing blood glucose 2-3 times a day. Blood glucose was 107 this morning, but was 300 last night    Lab Results   Component Value Date    LABA1C 7.6 11/15/2017     Lab Results   Component Value Date     03/23/2017     Has chronic back pain . Intensity of pain is 8/10. Pain interferes with sleep and activities. Patient has spinal stenosis, status post back surgery. Back pain is constant. He is on oxycodone for this and I prescribed him opioids.       -vital signs stable and within normal limits except Morbid obesity and unintentional weight loss    /61   Pulse 91   Temp 97.6 °F (36.4 °C) (Tympanic)   Ht 6' (1.829 m)   [employer] (!) 369 lb 12.8 oz (167.7 kg)   SpO2 97%   BMI 50.15 kg/m²       CT chest done in the hospital reviewed showing likely chronic bronchitis and fatty liver  .  No evidence of pulmonary embolism.       2.  Mild diffuse bronchial wall thickening, which could be related to acute   or chronic bronchitis.  No focal airspace consolidation or evidence of   pulmonary edema.       3.  Hepatic steatosis.       4.  Gynecomastia.           Labs reviewed hyperglycemia not controlled, patient reports blood glucose in the hospital was tested after eating. Anemia. He is refusing to have FIT. Patient had colonoscopy 2 years ago by Dr. Tim Yost which was a poor prep and showed internal hemorrhoids. He denies to me blood in the stool, however he did report this to the care coordinator when doing the telephone call. I told him we will discuss need for colon cancer screening again at the next visit. He says he is not interested at this time he doesn't want to know what's going.     CKD stage III stable  Hyperlipidemia and hypertriglyceridemia  Lab Results   Component Value Date    LABMICR 3 08/16/2017         Lab Results   Component Value Date    WBC 9.7 11/02/2017    HGB 12.6 (L) 11/02/2017    HCT 38.1 (L) 11/02/2017    MCV 85.1 11/02/2017     11/02/2017       Lab Results   Component Value Date     11/03/2017    K 4.8 11/03/2017    CL 99 11/03/2017    CO2 27 11/03/2017    BUN 30 11/03/2017    CREATININE 1.56 11/03/2017    GLUCOSE 307 11/03/2017    CALCIUM 8.7 11/03/2017        Lab Results   Component Value Date    ALT 15 08/16/2017    AST 15 08/16/2017    ALKPHOS 65 08/16/2017    BILITOT 0.31 08/16/2017       Lab Results   Component Value Date    TSH 3.11 08/05/2016       Lab Results   Component Value Date    CHOL 172 08/16/2017    CHOL 134 12/26/2016    CHOL 133 06/23/2016     Lab Results   Component Value Date    TRIG 278 (H) 08/16/2017    TRIG 199 (H) 12/26/2016    TRIG 292 (H) 06/23/2016     Lab Results   Component Value Date    HDL 27 (L) 08/16/2017    HDL 28 (L) 12/26/2016    HDL 26 (L) 06/23/2016     Lab Results   Component Value Date    LDLCHOLESTEROL 89 08/16/2017    LDLCHOLESTEROL 66 12/26/2016    LDLCHOLESTEROL 49 06/23/2016       Lab Results   Component Value Date    CHOLHDLRATIO 6.4 (H) 08/16/2017    CHOLHDLRATIO 4.8 12/26/2016    CHOLHDLRATIO 5.1 (H) 06/23/2016       Lab Results   Component Value Date    LABA1C 7.6 11/15/2017       Lab Results   Component Value Date    OBTLNABP67 364 02/24/2016       Lab Results   Component Value Date    FOLATE 19.3 02/24/2016       Lab Results   Component Value Date    IRON 75 12/26/2016    TIBC 309 12/26/2016    FERRITIN 100 12/26/2016       Lab Results   Component Value Date    VITD25 36.9 12/26/2016             Review of Systems   Constitutional: Positive for fatigue and unexpected weight change. Negative for activity change, appetite change, chills, diaphoresis and fever. Respiratory: Positive for apnea (on BiPAP), cough and shortness of breath (improved). Negative for chest tightness and wheezing. Cardiovascular: Positive for leg swelling (improved). Negative for chest pain and palpitations. Gastrointestinal: Negative for abdominal distention, abdominal pain, constipation, diarrhea, nausea and vomiting. Endocrine: Negative for cold intolerance, heat intolerance, polydipsia, polyphagia and polyuria. Genitourinary: Positive for frequency (from diuretics). Musculoskeletal: Positive for back pain and myalgias (legs). Skin: Positive for rash (legs). Allergic/Immunologic: Positive for immunocompromised state. Neurological: Positive for numbness. Psychiatric/Behavioral: Positive for dysphoric mood and sleep disturbance. Negative for self-injury and suicidal ideas. The patient is nervous/anxious.         Non face to face  following discharge, date last encounter closed (first attempt may have been earlier): *No documented post hospital discharge outreach found in verbalized understanding to notify the physician office if these symptoms occur. Compliance with plan of care and further disease process causes discussed with patient, patient encouraged to keep all follow up appointments. Patient verbalized understanding. Continue current treatment with Bumex, metolazone, lisinopril, Lopressor and Aldactone   Patient follows with cardiology, CHF clinic and nephrologist to monitor   -     [place] DISCHARGE MEDS RECONCILED W/ CURRENT OUTPATIENT MED LIST    Bilateral leg edema  Multifactorial, lymphedema, and leg edema which is related to CHF  Continue leg wraps  Follow-up with lymphedema clinic  Leg elevation  Continue BiPAP use  Continue diuretics  Pending lymphedema pumps   -     [place] DISCHARGE MEDS RECONCILED W/ CURRENT OUTPATIENT MED LIST    Type 2 diabetes mellitus with diabetic neuropathy, with long-term current use of insulin ([address]  Controlled, improved from prior  -     POCT glycosylated hemoglobin (Hb A1C)  Lab Results   Component Value Date    LABA1C 7.6 11/15/2017         -     CBC; Future  -     Comprehensive Metabolic Panel; Future  -     Vitamin B12 & Folate; Future  -     TSH without Reflex; Future  -     [place] DISCHARGE MEDS RECONCILED W/ CURRENT OUTPATIENT MED LIST    -advised home blood glucose testing 2-3 times/day  -daily feet exam, Foot care: advised to wash feet daily, pat dry and apply lotion at night, avoiding between toes. Need to look at feet daily and report to a physician any signs of inflammation or skin damage  -annual dilated eye exam  -Low carb, low fat diet, increase fruits and vegetables, and exercise 4-5 times a day 30-40 minutes a day discussed  -continue:current treatment  -continue Aspirin  -continue ACEI and statin      Chronic midline low back pain with left-sided sciatica  -     oxyCODONE HCl (OXY-IR) 10 MG immediate release tablet;  Take 1 tablet by mouth every 8 hours as needed for Pain .  -     [place] DISCHARGE MEDS RECONCILED W/ CURRENT OUTPATIENT

## 2017-11-17 DIAGNOSIS — J20.9 ACUTE BRONCHITIS, UNSPECIFIED ORGANISM: ICD-10-CM

## 2017-11-17 DIAGNOSIS — J44.1 COPD EXACERBATION (HCC): ICD-10-CM

## 2017-11-17 DIAGNOSIS — D50.0 IRON DEFICIENCY ANEMIA DUE TO CHRONIC BLOOD LOSS: ICD-10-CM

## 2017-11-17 DIAGNOSIS — J44.9 CHRONIC OBSTRUCTIVE PULMONARY DISEASE, UNSPECIFIED COPD TYPE (HCC): ICD-10-CM

## 2017-11-17 RX ORDER — PNV NO.95/FERROUS FUM/FOLIC AC 28MG-0.8MG
1 TABLET ORAL DAILY
Qty: 90 TABLET | Refills: 3 | Status: SHIPPED | OUTPATIENT
Start: 2017-11-17 | End: 2018-11-19 | Stop reason: SDUPTHER

## 2017-11-17 RX ORDER — ALBUTEROL SULFATE 2.5 MG/3ML
2.5 SOLUTION RESPIRATORY (INHALATION) EVERY 6 HOURS PRN
Qty: 100 EACH | Refills: 3 | Status: SHIPPED | OUTPATIENT
Start: 2017-11-17 | End: 2018-04-11 | Stop reason: ALTCHOICE

## 2017-11-17 NOTE — TELEPHONE ENCOUNTER
From: Pebbles Hanson  Sent: 11/17/2017 11:13 AM EST  Subject: Medication Renewal Request    Pebbles Hanson would like a refill of the following medications:  Ferrous Sulfate (IRON) 325 (65 FE) MG TABS [Paige Bryant MD]  ADVAIR -21 MCG/ACT inhaler [Paige Bryant MD]  SPIRIVA HANDIHALER 18 MCG inhalation capsule Kelvin Ortiz MD]    Preferred pharmacy: [place] [address] [phone] - F [phone]    Comment:      Medication renewals requested in this message routed separately:  metoprolol tartrate (LOPRESSOR) 50 MG tablet [Marsha Henderson MD]  albuterol (PROVENTIL) (2.5 MG/3ML) 0.083% nebulizer solution Roldan Flor MD]

## 2017-11-21 ENCOUNTER — PATIENT MESSAGE (OUTPATIENT)
Dept: FAMILY MEDICINE CLINIC | Age: 52
End: 2017-11-21

## 2017-11-21 DIAGNOSIS — I10 ESSENTIAL HYPERTENSION: Primary | ICD-10-CM

## 2017-11-21 RX ORDER — METOPROLOL TARTRATE 50 MG/1
50 TABLET, FILM COATED ORAL 2 TIMES DAILY
Qty: 180 TABLET | Refills: 3 | Status: SHIPPED | OUTPATIENT
Start: 2017-11-21 | End: 2018-11-19 | Stop reason: SDUPTHER

## 2017-11-24 DIAGNOSIS — K59.01 SLOW TRANSIT CONSTIPATION: ICD-10-CM

## 2017-11-27 RX ORDER — DOCUSATE SODIUM 100 MG
CAPSULE ORAL
Qty: 60 CAPSULE | Refills: 2 | Status: SHIPPED | OUTPATIENT
Start: 2017-11-27 | End: 2018-02-25 | Stop reason: SDUPTHER

## 2017-11-27 NOTE — TELEPHONE ENCOUNTER
Please Approve or Refuse. Next Visit Date:  1/2/2018    Hemoglobin A1C (%)   Date Value   11/15/2017 7.6   08/16/2017 8.6   08/16/2017 8.6             ( goal A1C is < 7)   Microalb/Crt.  Ratio (mcg/mg creat)   Date Value   08/16/2017 3     LDL Cholesterol (mg/dL)   Date Value   08/16/2017 89       (goal LDL is <100)   AST (U/L)   Date Value   08/16/2017 15     ALT (U/L)   Date Value   08/16/2017 15     BUN (mg/dL)   Date Value   11/03/2017 30 (H)     BP Readings from Last 3 Encounters:   11/15/17 123/61   11/09/17 (!) 144/75   11/04/17 (!) 155/59          (goal 120/80)        Patient Active Problem List:     DDD (degenerative disc disease)     Hypertriglyceridemia     CKD (chronic kidney disease) stage 3, GFR 30-59 ml/min     CAD (coronary artery disease) s/p 1 stent     Mixed type COPD (chronic obstructive pulmonary disease) ([facility] [address]     Type 2 diabetes mellitus with diabetic neuropathy, with long-term current use of insulin (HCC)     Chronic pancreatitis (HCC)     Displacement of lumbar intervertebral disc without myelopathy     Obesity, morbid (more than 100 lbs over ideal weight or BMI > 40) (HCC)     Chronic diastolic congestive heart failure (HCC)     Peripheral neuropathy (HCC)     Insomnia     BPH (benign prostatic hyperplasia)     Smoker     Class 3 obesity due to excess calories with body mass index (BMI) of 50.0 to 59.9 in adult [facility])     Essential hypertension     Hepatic steatosis     History of rib fracture     Umbilical hernia     Left inguinal hernia     Adrenal gland anomaly     Lumbar disc narrowing     Stenosis, spinal, lumbar     Chronic back pain greater than 3 months duration     Gastroesophageal reflux disease without esophagitis     Hyperlipidemia with target LDL less than 70     Bilateral lower leg cellulitis     Lower urinary tract symptoms (LUTS)     Vitamin D deficiency     Iron deficiency anemia due to chronic blood loss     BARBY treated with BiPAP     Chronic midline low back pain with left-sided sciatica     Stasis dermatitis of both legs     Anxiety     Positive depression screening     Moderate recurrent major depression (HCC)     COPD exacerbation (HCC)     History of recurrent ear infection     Unintended weight gain     Anemia     Type 2 diabetes mellitus with stage 3 chronic kidney disease, with long-term current use of insulin (HCC)     Mixed conductive and sensorineural hearing loss of both ears     Tinnitus of both ears     Adhesive capsulitis of left shoulder     Slow transit constipation     Chronic otitis externa of right ear     Bilateral leg edema     Tinea pedis of both feet     Onychomycosis     MDD (major depressive disorder), recurrent severe, without psychosis (HCC)     Intolerance of continuous positive airway pressure (CPAP) ventilation     Dry skin dermatitis legs

## 2017-12-07 ENCOUNTER — HOSPITAL ENCOUNTER (OUTPATIENT)
Dept: OTHER | Age: 52
Discharge: HOME | End: 2017-12-07
Payer: COMMERCIAL

## 2017-12-07 ENCOUNTER — HOSPITAL ENCOUNTER (OUTPATIENT)
Dept: OCCUPATIONAL THERAPY | Age: 52
Setting detail: THERAPIES SERIES
Discharge: HOME | End: 2017-12-07
Payer: COMMERCIAL

## 2017-12-07 VITALS
HEART RATE: 86 BPM | BODY MASS INDEX: 50.97 KG/M2 | OXYGEN SATURATION: 95 % | RESPIRATION RATE: 20 BRPM | WEIGHT: 315 LBS | DIASTOLIC BLOOD PRESSURE: 62 MMHG | SYSTOLIC BLOOD PRESSURE: 124 MMHG

## 2017-12-07 PROCEDURE — G8988 SELF CARE GOAL STATUS: HCPCS

## 2017-12-07 PROCEDURE — G8987 SELF CARE CURRENT STATUS: HCPCS

## 2017-12-07 PROCEDURE — 99211 OFF/OP EST MAY X REQ PHY/QHP: CPT

## 2017-12-07 PROCEDURE — 97140 MANUAL THERAPY 1/> REGIONS: CPT
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
## 2017-12-13 ENCOUNTER — HOSPITAL ENCOUNTER (OUTPATIENT)
Age: 52
Discharge: HOME | End: 2017-12-13
Payer: COMMERCIAL

## 2017-12-13 LAB
ALBUMIN SERPL-MCNC: 3.8 G/DL (ref 3.5–5.2)
ALBUMIN/GLOBULIN RATIO: ABNORMAL (ref 1–2.5)
ALP BLD-CCNC: 70 U/L (ref 40–129)
ALT SERPL-CCNC: 13 U/L (ref 5–41)
ANION GAP SERPL CALCULATED.3IONS-SCNC: 16 MMOL/L (ref 9–17)
AST SERPL-CCNC: 12 U/L
BILIRUB SERPL-MCNC: 0.17 MG/DL (ref 0.3–1.2)
BUN BLDV-MCNC: 28 MG/DL (ref 6–20)
BUN/CREAT BLD: ABNORMAL (ref 9–20)
CALCIUM SERPL-MCNC: 9.3 MG/DL (ref 8.6–10.4)
CHLORIDE BLD-SCNC: 100 MMOL/L (ref 98–107)
CO2: 23 MMOL/L (ref 20–31)
CREAT SERPL-MCNC: 1.39 MG/DL (ref 0.7–1.2)
CREATININE URINE: 144.7 MG/DL (ref 39–259)
GFR AFRICAN AMERICAN: >60 ML/MIN
GFR NON-AFRICAN AMERICAN: 54 ML/MIN
GFR SERPL CREATININE-BSD FRML MDRD: ABNORMAL ML/MIN/{1.73_M2}
GFR SERPL CREATININE-BSD FRML MDRD: ABNORMAL ML/MIN/{1.73_M2}
GLUCOSE BLD-MCNC: 211 MG/DL (ref 70–99)
MICROALBUMIN/CREAT 24H UR: <12 MG/L
MICROALBUMIN/CREAT UR-RTO: NORMAL MCG/MG CREAT
POTASSIUM SERPL-SCNC: 4.4 MMOL/L (ref 3.7–5.3)
SODIUM BLD-SCNC: 139 MMOL/L (ref 135–144)
TOTAL PROTEIN: 7 G/DL (ref 6.4–8.3)

## 2017-12-13 PROCEDURE — 82570 ASSAY OF URINE CREATININE: CPT

## 2017-12-13 PROCEDURE — 82043 UR ALBUMIN QUANTITATIVE: CPT

## 2017-12-13 PROCEDURE — 36415 COLL VENOUS BLD VENIPUNCTURE: CPT

## 2017-12-13 PROCEDURE — 80053 COMPREHEN METABOLIC PANEL: CPT

## 2017-12-14 ENCOUNTER — PATIENT MESSAGE (OUTPATIENT)
Dept: FAMILY MEDICINE CLINIC | Age: 52
End: 2017-12-14

## 2017-12-14 DIAGNOSIS — M54.42 CHRONIC MIDLINE LOW BACK PAIN WITH LEFT-SIDED SCIATICA: ICD-10-CM

## 2017-12-14 DIAGNOSIS — I50.32 CHRONIC DIASTOLIC CONGESTIVE HEART FAILURE (HCC): ICD-10-CM

## 2017-12-14 DIAGNOSIS — G47.01 INSOMNIA DUE TO MEDICAL CONDITION: ICD-10-CM

## 2017-12-14 DIAGNOSIS — G89.29 CHRONIC MIDLINE LOW BACK PAIN WITH LEFT-SIDED SCIATICA: ICD-10-CM

## 2017-12-14 RX ORDER — OXYCODONE HYDROCHLORIDE 10 MG/1
10 TABLET ORAL EVERY 8 HOURS PRN
Qty: 90 TABLET | Refills: 0 | Status: CANCELLED | OUTPATIENT
Start: 2017-12-14

## 2017-12-14 RX ORDER — OXYCODONE HYDROCHLORIDE 10 MG/1
10 TABLET ORAL EVERY 8 HOURS PRN
Qty: 90 TABLET | Refills: 0 | Status: SHIPPED | OUTPATIENT
Start: 2017-12-14 | End: 2018-01-15 | Stop reason: SDUPTHER

## 2017-12-14 NOTE — TELEPHONE ENCOUNTER
From: Vaughn Kitchen.   To: Hilary Yip MD  Sent: 12/14/2017 8:52 AM EST  Subject: Prescription Question    Can you please refill my pain meds for me I will run out Saturday thank you

## 2017-12-14 NOTE — TELEPHONE ENCOUNTER
From: Nieves Barksdale.   Sent: 12/14/2017 8:49 AM EST  Subject: Medication Renewal Request    Miguel Chua Pump. would like a refill of the following medications:  oxyCODONE HCl (OXY-IR) 10 MG immediate release tablet Amadeo Oliver MD]    [facility]erred pharmacy: [facility] [address] [place] [address] [phone] - F [phone]    Comment:

## 2017-12-14 NOTE — TELEPHONE ENCOUNTER
Controlled Substances Monitoring:     Attestation: The Prescription Monitoring Report for this patient was reviewed today. Martin Ward MD)  Documentation: No signs of potential drug abuse or diversion identified., Existing medication contract.  Martin Ward MD)

## 2018-01-08 ENCOUNTER — TELEPHONE (OUTPATIENT)
Dept: FAMILY MEDICINE CLINIC | Age: 53
End: 2018-01-08

## 2018-01-08 DIAGNOSIS — J20.9 ACUTE BRONCHITIS DUE TO INFECTION: Primary | ICD-10-CM

## 2018-01-08 RX ORDER — AZITHROMYCIN 250 MG/1
TABLET, FILM COATED ORAL
Qty: 1 PACKET | Refills: 0 | Status: SHIPPED | OUTPATIENT
Start: 2018-01-08 | End: 2018-01-18

## 2018-01-08 RX ORDER — GUAIFENESIN AND CODEINE PHOSPHATE 100; 10 MG/5ML; MG/5ML
5 SOLUTION ORAL 4 TIMES DAILY PRN
Qty: 140 ML | Refills: 0 | Status: SHIPPED | OUTPATIENT
Start: 2018-01-08 | End: 2018-01-15

## 2018-01-08 NOTE — TELEPHONE ENCOUNTER
Patient called today with c/o of ear pain, sore throat, cough, rib pain x 2-3 days. Patient is requesting that a cough medication and antibiotic be sent in. Patient last seen 11/15/2017.

## 2018-01-08 NOTE — TELEPHONE ENCOUNTER
Please let the patient know to  prescription from pharmacy. Requested Prescriptions     Signed Prescriptions Disp Refills    azithromycin (ZITHROMAX) 250 MG tablet 1 packet 0     Sig: Take 2 tabs on day 1, then 1 tab on days 2-5     Authorizing Provider: Julia Severino guaiFENesin-codeine (TUSSI-ORGANIDIN NR) 100-10 MG/5ML syrup 140 mL 0     Sig: Take 5 mLs by mouth 4 times daily as needed for Cough or Congestion for up to 7 days. Authorizing Provider: Cecilia Vincent         Thank you! FYI    Future Appointments  Date Time Provider William Hernandez   1/11/2018 7:00 AM [facility] RM [facility] None   1/19/2018 8:30 AM Inderjit Francois MD [facility]   2/7/2018 11:00 AM Kaila Mcarthur MD Renal [facility] None   3/8/2018 10:30 AM Miriam Elizondo MD Resp Spec [facility]       Controlled Substances Monitoring: Attestation: The Prescription Monitoring Report for this patient was reviewed today. Inderjit Francois MD)  Documentation: No signs of potential drug abuse or diversion identified.  Inderjit Francois MD)

## 2018-01-11 ENCOUNTER — HOSPITAL ENCOUNTER (OUTPATIENT)
Age: 53
Discharge: HOME | End: 2018-01-11
Payer: COMMERCIAL

## 2018-01-11 ENCOUNTER — HOSPITAL ENCOUNTER (OUTPATIENT)
Dept: OTHER | Age: 53
Discharge: HOME | End: 2018-01-11
Payer: COMMERCIAL

## 2018-01-11 VITALS
WEIGHT: 315 LBS | SYSTOLIC BLOOD PRESSURE: 138 MMHG | HEART RATE: 95 BPM | OXYGEN SATURATION: 99 % | DIASTOLIC BLOOD PRESSURE: 66 MMHG | RESPIRATION RATE: 20 BRPM | BODY MASS INDEX: 51.56 KG/M2

## 2018-01-11 DIAGNOSIS — I50.32 CHRONIC DIASTOLIC CONGESTIVE HEART FAILURE (HCC): ICD-10-CM

## 2018-01-11 DIAGNOSIS — E11.40 TYPE 2 DIABETES MELLITUS WITH DIABETIC NEUROPATHY, WITH LONG-TERM CURRENT USE OF INSULIN (HCC): ICD-10-CM

## 2018-01-11 DIAGNOSIS — Z79.4 TYPE 2 DIABETES MELLITUS WITH DIABETIC NEUROPATHY, WITH LONG-TERM CURRENT USE OF INSULIN (HCC): ICD-10-CM

## 2018-01-11 LAB
ALBUMIN SERPL-MCNC: 3.9 G/DL (ref 3.5–5.2)
ALBUMIN/GLOBULIN RATIO: ABNORMAL (ref 1–2.5)
ALP BLD-CCNC: 61 U/L (ref 40–129)
ALT SERPL-CCNC: 13 U/L (ref 5–41)
ANION GAP SERPL CALCULATED.3IONS-SCNC: 16 MMOL/L (ref 9–17)
AST SERPL-CCNC: 13 U/L
BILIRUB SERPL-MCNC: 0.29 MG/DL (ref 0.3–1.2)
BUN BLDV-MCNC: 26 MG/DL (ref 6–20)
BUN/CREAT BLD: ABNORMAL (ref 9–20)
CALCIUM SERPL-MCNC: 9.4 MG/DL (ref 8.6–10.4)
CHLORIDE BLD-SCNC: 100 MMOL/L (ref 98–107)
CO2: 25 MMOL/L (ref 20–31)
CREAT SERPL-MCNC: 1.4 MG/DL (ref 0.7–1.2)
FOLATE: 19.4 NG/ML
GFR AFRICAN AMERICAN: >60 ML/MIN
GFR NON-AFRICAN AMERICAN: 53 ML/MIN
GFR SERPL CREATININE-BSD FRML MDRD: ABNORMAL ML/MIN/{1.73_M2}
GFR SERPL CREATININE-BSD FRML MDRD: ABNORMAL ML/MIN/{1.73_M2}
GLUCOSE BLD-MCNC: 89 MG/DL (ref 70–99)
HCT VFR BLD CALC: 38.8 % (ref 41–53)
HEMOGLOBIN: 12.6 G/DL (ref 13.5–17.5)
MCH RBC QN AUTO: 27.3 PG (ref 26–34)
MCHC RBC AUTO-ENTMCNC: 32.6 G/DL (ref 31–37)
MCV RBC AUTO: 83.6 FL (ref 80–100)
PDW BLD-RTO: 16.7 % (ref 11.5–14.9)
PLATELET # BLD: 162 K/UL (ref 150–450)
PMV BLD AUTO: 8.9 FL (ref 6–12)
POTASSIUM SERPL-SCNC: 3.8 MMOL/L (ref 3.7–5.3)
RBC # BLD: 4.63 M/UL (ref 4.5–5.9)
SODIUM BLD-SCNC: 141 MMOL/L (ref 135–144)
TOTAL PROTEIN: 7.1 G/DL (ref 6.4–8.3)
TSH SERPL DL<=0.05 MIU/L-ACNC: 2.04 MIU/L (ref 0.3–5)
VITAMIN B-12: 351 PG/ML (ref 232–1245)
WBC # BLD: 7.6 K/UL (ref 3.5–11)

## 2018-01-11 PROCEDURE — 82607 VITAMIN B-12: CPT

## 2018-01-11 PROCEDURE — 99211 OFF/OP EST MAY X REQ PHY/QHP: CPT

## 2018-01-11 PROCEDURE — 85027 COMPLETE CBC AUTOMATED: CPT

## 2018-01-11 PROCEDURE — 80053 COMPREHEN METABOLIC PANEL: CPT

## 2018-01-11 PROCEDURE — 84443 ASSAY THYROID STIM HORMONE: CPT

## 2018-01-11 PROCEDURE — 82746 ASSAY OF FOLIC ACID SERUM: CPT

## 2018-01-11 PROCEDURE — 36415 COLL VENOUS BLD VENIPUNCTURE: CPT

## 2018-01-11 NOTE — PROGRESS NOTES
Pt doing much better. Living with his sister and a motel. Has decided not to pursue his home issues. Seems cheerful today almost. Labs drawn. Discussed asking PCP about his neuropathy pain after the cellulitis has been resolved. May actually be of benefit now. Pt receptive. Recheck in one month.

## 2018-01-11 NOTE — PROGRESS NOTES
[employer] comment sent to patient. Next appointment 1/19/18   Stable kidney function. Controlled diabetes. Anemia is stable. Vitamin B12 is still slightly lower. Continue current treatment.   Lab Results       Component                Value               Date                       LABA1C                   7.6                 11/15/2017            Lab Results       Component                Value               Date                       EAG                      163                 03/23/2017

## 2018-01-12 ENCOUNTER — TELEPHONE (OUTPATIENT)
Dept: ADMINISTRATIVE | Age: 53
End: 2018-01-12

## 2018-01-15 ENCOUNTER — PATIENT MESSAGE (OUTPATIENT)
Dept: FAMILY MEDICINE CLINIC | Age: 53
End: 2018-01-15

## 2018-01-15 DIAGNOSIS — I50.32 CHRONIC DIASTOLIC CONGESTIVE HEART FAILURE (HCC): ICD-10-CM

## 2018-01-15 DIAGNOSIS — G47.01 INSOMNIA DUE TO MEDICAL CONDITION: ICD-10-CM

## 2018-01-15 DIAGNOSIS — G89.29 CHRONIC MIDLINE LOW BACK PAIN WITH LEFT-SIDED SCIATICA: ICD-10-CM

## 2018-01-15 DIAGNOSIS — M54.42 CHRONIC MIDLINE LOW BACK PAIN WITH LEFT-SIDED SCIATICA: ICD-10-CM

## 2018-01-15 RX ORDER — OXYCODONE HYDROCHLORIDE 10 MG/1
10 TABLET ORAL EVERY 8 HOURS PRN
Qty: 90 TABLET | Refills: 0 | Status: CANCELLED | OUTPATIENT
Start: 2018-01-15

## 2018-01-15 RX ORDER — OXYCODONE HYDROCHLORIDE 10 MG/1
10 TABLET ORAL EVERY 8 HOURS PRN
Qty: 90 TABLET | Refills: 0 | Status: SHIPPED | OUTPATIENT
Start: 2018-01-15 | End: 2018-02-14 | Stop reason: SDUPTHER

## 2018-01-15 NOTE — TELEPHONE ENCOUNTER
Controlled Substances Monitoring:     Attestation: The Prescription Monitoring Report for this patient was reviewed today. Sean Anand MD)  Documentation: No signs of potential drug abuse or diversion identified. Sean Anand MD)  Medication Contracts: Existing medication contract.  Sean Anand MD)

## 2018-01-18 ENCOUNTER — HOSPITAL ENCOUNTER (EMERGENCY)
Age: 53
Discharge: HOME | End: 2018-01-18
Attending: EMERGENCY MEDICINE
Payer: COMMERCIAL

## 2018-01-18 ENCOUNTER — APPOINTMENT (OUTPATIENT)
Dept: GENERAL RADIOLOGY | Age: 53
End: 2018-01-18
Payer: COMMERCIAL

## 2018-01-18 VITALS
TEMPERATURE: 98.8 F | DIASTOLIC BLOOD PRESSURE: 48 MMHG | WEIGHT: 315 LBS | RESPIRATION RATE: 20 BRPM | OXYGEN SATURATION: 92 % | HEIGHT: 72 IN | BODY MASS INDEX: 42.66 KG/M2 | HEART RATE: 98 BPM | SYSTOLIC BLOOD PRESSURE: 143 MMHG

## 2018-01-18 DIAGNOSIS — J44.1 COPD EXACERBATION (HCC): Primary | ICD-10-CM

## 2018-01-18 LAB
ABSOLUTE EOS #: 0 K/UL (ref 0–0.4)
ABSOLUTE IMMATURE GRANULOCYTE: ABNORMAL K/UL (ref 0–0.3)
ABSOLUTE LYMPH #: 0.5 K/UL (ref 1–4.8)
ABSOLUTE MONO #: 0.7 K/UL (ref 0.1–1.3)
ANION GAP SERPL CALCULATED.3IONS-SCNC: 14 MMOL/L (ref 9–17)
BASOPHILS # BLD: 1 % (ref 0–2)
BASOPHILS ABSOLUTE: 0 K/UL (ref 0–0.2)
BUN BLDV-MCNC: 27 MG/DL (ref 6–20)
BUN/CREAT BLD: ABNORMAL (ref 9–20)
CALCIUM SERPL-MCNC: 9.5 MG/DL (ref 8.6–10.4)
CHLORIDE BLD-SCNC: 94 MMOL/L (ref 98–107)
CO2: 29 MMOL/L (ref 20–31)
CREAT SERPL-MCNC: 1.52 MG/DL (ref 0.7–1.2)
DIFFERENTIAL TYPE: ABNORMAL
EKG ATRIAL RATE: 96 BPM
EKG P AXIS: 56 DEGREES
EKG P-R INTERVAL: 146 MS
EKG Q-T INTERVAL: 344 MS
EKG QRS DURATION: 116 MS
EKG QTC CALCULATION (BAZETT): 434 MS
EKG R AXIS: -22 DEGREES
EKG T AXIS: 89 DEGREES
EKG VENTRICULAR RATE: 96 BPM
EOSINOPHILS RELATIVE PERCENT: 0 % (ref 0–4)
GFR AFRICAN AMERICAN: 58 ML/MIN
GFR NON-AFRICAN AMERICAN: 48 ML/MIN
GFR SERPL CREATININE-BSD FRML MDRD: ABNORMAL ML/MIN/{1.73_M2}
GFR SERPL CREATININE-BSD FRML MDRD: ABNORMAL ML/MIN/{1.73_M2}
GLUCOSE BLD-MCNC: 86 MG/DL (ref 70–99)
HCT VFR BLD CALC: 37.4 % (ref 41–53)
HEMOGLOBIN: 12.1 G/DL (ref 13.5–17.5)
IMMATURE GRANULOCYTES: ABNORMAL %
LYMPHOCYTES # BLD: 7 % (ref 24–44)
MCH RBC QN AUTO: 26.7 PG (ref 26–34)
MCHC RBC AUTO-ENTMCNC: 32.2 G/DL (ref 31–37)
MCV RBC AUTO: 83 FL (ref 80–100)
MONOCYTES # BLD: 10 % (ref 1–7)
NRBC AUTOMATED: ABNORMAL PER 100 WBC
PDW BLD-RTO: 17.1 % (ref 11.5–14.9)
PLATELET # BLD: 156 K/UL (ref 150–450)
PLATELET ESTIMATE: ABNORMAL
PMV BLD AUTO: 8.9 FL (ref 6–12)
POTASSIUM SERPL-SCNC: 4.5 MMOL/L (ref 3.7–5.3)
RBC # BLD: 4.51 M/UL (ref 4.5–5.9)
RBC # BLD: ABNORMAL 10*6/UL
SEG NEUTROPHILS: 82 % (ref 36–66)
SEGMENTED NEUTROPHILS ABSOLUTE COUNT: 5.8 K/UL (ref 1.3–9.1)
SODIUM BLD-SCNC: 137 MMOL/L (ref 135–144)
TROPONIN INTERP: NORMAL
TROPONIN T: <0.03 NG/ML
WBC # BLD: 7 K/UL (ref 3.5–11)
WBC # BLD: ABNORMAL 10*3/UL

## 2018-01-18 PROCEDURE — 96374 THER/PROPH/DIAG INJ IV PUSH: CPT

## 2018-01-18 PROCEDURE — 84484 ASSAY OF TROPONIN QUANT: CPT

## 2018-01-18 PROCEDURE — 85025 COMPLETE CBC W/AUTO DIFF WBC: CPT

## 2018-01-18 PROCEDURE — 94640 AIRWAY INHALATION TREATMENT: CPT

## 2018-01-18 PROCEDURE — 94762 N-INVAS EAR/PLS OXIMTRY CONT: CPT

## 2018-01-18 PROCEDURE — 6360000002 HC RX W HCPCS: Performed by: EMERGENCY MEDICINE

## 2018-01-18 PROCEDURE — 94664 DEMO&/EVAL PT USE INHALER: CPT

## 2018-01-18 PROCEDURE — 80048 BASIC METABOLIC PNL TOTAL CA: CPT

## 2018-01-18 PROCEDURE — 93005 ELECTROCARDIOGRAM TRACING: CPT

## 2018-01-18 PROCEDURE — 6370000000 HC RX 637 (ALT 250 FOR IP): Performed by: EMERGENCY MEDICINE

## 2018-01-18 PROCEDURE — 99285 EMERGENCY DEPT VISIT HI MDM: CPT

## 2018-01-18 PROCEDURE — 71045 X-RAY EXAM CHEST 1 VIEW: CPT

## 2018-01-18 PROCEDURE — 36415 COLL VENOUS BLD VENIPUNCTURE: CPT

## 2018-01-18 RX ORDER — OSELTAMIVIR PHOSPHATE 75 MG/1
75 CAPSULE ORAL ONCE
Status: COMPLETED | OUTPATIENT
Start: 2018-01-18 | End: 2018-01-18

## 2018-01-18 RX ORDER — METHYLPREDNISOLONE SODIUM SUCCINATE 125 MG/2ML
125 INJECTION, POWDER, LYOPHILIZED, FOR SOLUTION INTRAMUSCULAR; INTRAVENOUS ONCE
Status: COMPLETED | OUTPATIENT
Start: 2018-01-18 | End: 2018-01-18

## 2018-01-18 RX ORDER — IPRATROPIUM BROMIDE AND ALBUTEROL SULFATE 2.5; .5 MG/3ML; MG/3ML
1 SOLUTION RESPIRATORY (INHALATION) ONCE
Status: COMPLETED | OUTPATIENT
Start: 2018-01-18 | End: 2018-01-18

## 2018-01-18 RX ORDER — OSELTAMIVIR PHOSPHATE 75 MG/1
75 CAPSULE ORAL 2 TIMES DAILY
Qty: 10 CAPSULE | Refills: 0 | Status: SHIPPED | OUTPATIENT
Start: 2018-01-18 | End: 2018-01-23

## 2018-01-18 RX ORDER — PREDNISONE 10 MG/1
TABLET ORAL
Qty: 20 TABLET | Refills: 0 | Status: SHIPPED | OUTPATIENT
Start: 2018-01-18 | End: 2018-01-28

## 2018-01-18 RX ORDER — DEXAMETHASONE 4 MG/1
10 TABLET ORAL ONCE
Status: COMPLETED | OUTPATIENT
Start: 2018-01-18 | End: 2018-01-18

## 2018-01-18 RX ADMIN — OSELTAMIVIR PHOSPHATE 75 MG: 75 CAPSULE ORAL at 19:13

## 2018-01-18 RX ADMIN — METHYLPREDNISOLONE SODIUM SUCCINATE 125 MG: 125 INJECTION, POWDER, FOR SOLUTION INTRAMUSCULAR; INTRAVENOUS at 18:29

## 2018-01-18 RX ADMIN — DEXAMETHASONE 10 MG: 4 TABLET ORAL at 19:14

## 2018-01-18 RX ADMIN — IPRATROPIUM BROMIDE AND ALBUTEROL SULFATE 1 AMPULE: .5; 3 SOLUTION RESPIRATORY (INHALATION) at 17:42

## 2018-01-18 ASSESSMENT — PAIN DESCRIPTION - LOCATION: LOCATION: RIB CAGE

## 2018-01-18 ASSESSMENT — PAIN DESCRIPTION - DESCRIPTORS: DESCRIPTORS: ACHING

## 2018-01-18 ASSESSMENT — PAIN DESCRIPTION - ORIENTATION: ORIENTATION: RIGHT

## 2018-01-18 ASSESSMENT — PAIN DESCRIPTION - FREQUENCY: FREQUENCY: CONTINUOUS

## 2018-01-18 ASSESSMENT — PAIN SCALES - GENERAL: PAINLEVEL_OUTOF10: 7

## 2018-01-18 NOTE — ED NOTES
Pt arrived in ED with c/o shortness of breath. Pt states he began having these symptoms yesterday and he felt okay, but today his shortness of breath has increased. Pt states he has a hx of COPD and states his rib care is causing him pain. Pt states the pain is worse on the right side and radiates through to his right upper back/shoulder blade when he coughs. Pt states he wears 2L NC at home continuously. Pt was  tachypneic with labored breathing upon arrival to room. Pt was placed on NC 5L once in room and respiratory was paged by Omar Casarez. Pt is breathing more evenly with oxygen saturation at 95%. Pt is alert and oriented x3 at this time.       Sonam Fam RN  01/18/18 [id number]

## 2018-01-18 NOTE — ED PROVIDER NOTES
50.86 kg/m²     Physical Exam   Constitutional: He appears well-developed and well-nourished. He appears distressed. HENT:   Head: Normocephalic and atraumatic. Eyes: Pupils are equal, round, and reactive to light. Neck: Normal range of motion. Cardiovascular: Normal rate, regular rhythm, normal heart sounds and intact distal pulses. No murmur heard. Pulmonary/Chest: He is in respiratory distress. He has wheezes. He has no rales. He exhibits no tenderness. Abdominal: Soft. He exhibits no distension. There is no tenderness. There is no rebound. Musculoskeletal: Normal range of motion. He exhibits no edema. Neurological: He is alert. No cranial nerve deficit. Skin: Skin is warm and dry. He is not diaphoretic. No erythema. Psychiatric: He has a normal mood and affect. His behavior is normal.   Nursing note and vitals reviewed.       DIFFERENTIAL  DIAGNOSIS     PLAN (LABS / IMAGING / EKG):  Orders Placed This Encounter   Procedures    XR CHEST PORTABLE    CBC Auto Differential    Basic Metabolic Panel    Troponin    HHN Treatment    EKG 12 Lead       MEDICATIONS ORDERED:  Orders Placed This Encounter   Medications    ipratropium-albuterol (DUONEB) nebulizer solution 1 ampule    methylPREDNISolone sodium (SOLU-MEDROL) injection 125 mg    oseltamivir (TAMIFLU) capsule 75 mg    predniSONE (DELTASONE) 10 MG tablet     Sig: Take 4 tablets by mouth once daily for 5 days     Dispense:  20 tablet     Refill:  0    oseltamivir (TAMIFLU) 75 MG capsule     Sig: Take 1 capsule by mouth 2 times daily for 5 days     Dispense:  10 capsule     Refill:  0    dexamethasone (DECADRON) tablet 10 mg       DDX: Pneumonia, COPD exacerbation, influenza    DIAGNOSTIC RESULTS / EMERGENCY DEPARTMENT COURSE / MDM     LABS:  Results for orders placed or performed during the hospital encounter of 01/18/18   CBC Auto Differential   Result Value Ref Range    WBC 7.0 3.5 - 11.0 k/uL    RBC 4.51 4.5 - 5.9 m/uL patient likely has the flu versus pneumonia. We'll get a chest x-ray, labs, steroids and continued nebulized treatments. Depending on patient's response will determine disposition however anticipation may need to stay. RADIOLOGY:  Xr Chest Portable    Result Date: 1/18/2018  EXAMINATION: SINGLE VIEW OF THE CHEST 1/18/2018 6:11 pm COMPARISON: November 2, 2017 HISTORY: ORDERING SYSTEM PROVIDED HISTORY: shortness of breath TECHNOLOGIST PROVIDED HISTORY: Reason for exam:->shortness of breath Ordering Physician Provided Reason for Exam: shortness of breath Acuity: Acute Type of Exam: Initial FINDINGS: The lungs are without acute focal process. There is no effusion or pneumothorax. The cardiomediastinal silhouette is without acute process. The osseous structures are without acute process. No acute process. EKG  None    All EKG's are interpreted by the Emergency Department Physician who either signs or Co-signs this chart in the absence of a cardiologist.    EMERGENCY DEPARTMENT COURSE:  Patient received nebulized treatments as well as IV Solu-Medrol. Given history of fever and chills and worsening shortness of breath likely patient may have flu symptoms started him on Tamiflu. Otherwise x-ray does not show any signs of acute consolidation requiring antibiotics. Patient reports that after nebulizers she is back to his baseline and feels comfortable going home. We will give him a dose of Decadron here and discharge him home with prednisone burst and Tamiflu. Patient is agreeable to this and states that he'll return if worsened or symptoms not improved. PROCEDURES:  None    CONSULTS:  None    CRITICAL CARE:  None    FINAL IMPRESSION      1. COPD exacerbation ([facility] [address]          DISPOSITION / PLAN     DISPOSITION Decision To Discharge 01/18/2018 07:00:33 PM      PATIENT REFERRED TO:  No follow-up provider specified.     DISCHARGE MEDICATIONS:  Discharge Medication List as of 1/18/2018  7:03 PM      START taking these medications    Details   predniSONE (DELTASONE) 10 MG tablet Take 4 tablets by mouth once daily for 5 days, Disp-20 tablet, R-0Print      oseltamivir (TAMIFLU) 75 MG capsule Take 1 capsule by mouth 2 times daily for 5 days, Disp-10 capsule, R-0Print             Chitra Dudley DO  Emergency Medicine Resident    (Please note that portions of this note were completed with a voice recognition program.  Efforts were made to edit the dictations but occasionally words are mis-transcribed. )       Chitra Dudley DO  Resident  01/19/18 [id number]

## 2018-01-18 NOTE — ED PROVIDER NOTES
[facility]     Emergency Department     Faculty Attestation    I performed a history and physical examination of the patient and discussed management with the resident. I reviewed the residents note and agree with the documented findings and plan of care. Any areas of disagreement are noted on the chart. I was personally present for the key portions of any procedures. I have documented in the chart those procedures where I was not present during the key portions. I have reviewed the emergency nurses triage note. I agree with the chief complaint, past medical history, past surgical history, allergies, medications, social and family history as documented unless otherwise noted below. Documentation of the HPI, Physical Exam and Medical Decision Making performed by medical students or scribes is based on my personal performance of the HPI, PE and MDM. For APC cases, I have personally evaluated and examined the patient in conjunction with the APC and agree with the assessment, treatment plan, and disposition of the patient as recorded by the APCAdditional findings are as noted.       CHIEF COMPLAINT       Chief Complaint   Patient presents with    Shortness of Breath       RECENT VITALS:   BP (!) 143/48   Pulse 100   Temp 98.8 °F (37.1 °C) (Oral)   Resp 22   Ht 6' (1.829 m)   Wt (!) 375 lb (170.1 kg)   SpO2 92%   BMI 50.86 kg/m²       PERTINENT ATTENDING PHYSICIAN COMMENTS:            Chrissy Angelo MD, [address], [facility]  Attending Emergency Physician           Chrissy Angelo MD  01/18/18 [id number]

## 2018-01-19 ENCOUNTER — OFFICE VISIT (OUTPATIENT)
Dept: FAMILY MEDICINE CLINIC | Age: 53
End: 2018-01-19
Payer: COMMERCIAL

## 2018-01-19 ENCOUNTER — TELEPHONE (OUTPATIENT)
Dept: PULMONOLOGY | Age: 53
End: 2018-01-19

## 2018-01-19 VITALS
DIASTOLIC BLOOD PRESSURE: 72 MMHG | HEIGHT: 72 IN | HEART RATE: 94 BPM | BODY MASS INDEX: 42.66 KG/M2 | SYSTOLIC BLOOD PRESSURE: 138 MMHG | WEIGHT: 315 LBS | OXYGEN SATURATION: 93 %

## 2018-01-19 DIAGNOSIS — I50.32 CHRONIC DIASTOLIC CONGESTIVE HEART FAILURE (HCC): ICD-10-CM

## 2018-01-19 DIAGNOSIS — J44.1 COPD EXACERBATION (HCC): ICD-10-CM

## 2018-01-19 DIAGNOSIS — G89.29 CHRONIC BACK PAIN GREATER THAN 3 MONTHS DURATION: ICD-10-CM

## 2018-01-19 DIAGNOSIS — J20.9 ACUTE BRONCHITIS DUE TO INFECTION: Primary | ICD-10-CM

## 2018-01-19 DIAGNOSIS — M54.9 CHRONIC BACK PAIN GREATER THAN 3 MONTHS DURATION: ICD-10-CM

## 2018-01-19 DIAGNOSIS — J44.1 COPD EXACERBATION (HCC): Primary | ICD-10-CM

## 2018-01-19 DIAGNOSIS — E11.40 TYPE 2 DIABETES MELLITUS WITH DIABETIC NEUROPATHY, WITH LONG-TERM CURRENT USE OF INSULIN (HCC): ICD-10-CM

## 2018-01-19 DIAGNOSIS — N18.30 CKD (CHRONIC KIDNEY DISEASE) STAGE 3, GFR 30-59 ML/MIN (HCC): ICD-10-CM

## 2018-01-19 DIAGNOSIS — J44.9 MIXED TYPE COPD (CHRONIC OBSTRUCTIVE PULMONARY DISEASE) (HCC): ICD-10-CM

## 2018-01-19 DIAGNOSIS — M48.061 SPINAL STENOSIS OF LUMBAR REGION WITHOUT NEUROGENIC CLAUDICATION: ICD-10-CM

## 2018-01-19 DIAGNOSIS — Z79.4 TYPE 2 DIABETES MELLITUS WITH DIABETIC NEUROPATHY, WITH LONG-TERM CURRENT USE OF INSULIN (HCC): ICD-10-CM

## 2018-01-19 DIAGNOSIS — G47.33 OSA TREATED WITH BIPAP: ICD-10-CM

## 2018-01-19 PROCEDURE — 96372 THER/PROPH/DIAG INJ SC/IM: CPT | Performed by: FAMILY MEDICINE

## 2018-01-19 PROCEDURE — 99215 OFFICE O/P EST HI 40 MIN: CPT | Performed by: FAMILY MEDICINE

## 2018-01-19 PROCEDURE — 1036F TOBACCO NON-USER: CPT | Performed by: FAMILY MEDICINE

## 2018-01-19 PROCEDURE — G8427 DOCREV CUR MEDS BY ELIG CLIN: HCPCS | Performed by: FAMILY MEDICINE

## 2018-01-19 PROCEDURE — G8417 CALC BMI ABV UP PARAM F/U: HCPCS | Performed by: FAMILY MEDICINE

## 2018-01-19 PROCEDURE — 3046F HEMOGLOBIN A1C LEVEL >9.0%: CPT | Performed by: FAMILY MEDICINE

## 2018-01-19 PROCEDURE — 3017F COLORECTAL CA SCREEN DOC REV: CPT | Performed by: FAMILY MEDICINE

## 2018-01-19 PROCEDURE — 3023F SPIROM DOC REV: CPT | Performed by: FAMILY MEDICINE

## 2018-01-19 PROCEDURE — G8926 SPIRO NO PERF OR DOC: HCPCS | Performed by: FAMILY MEDICINE

## 2018-01-19 PROCEDURE — G8484 FLU IMMUNIZE NO ADMIN: HCPCS | Performed by: FAMILY MEDICINE

## 2018-01-19 PROCEDURE — G8598 ASA/ANTIPLAT THER USED: HCPCS | Performed by: FAMILY MEDICINE

## 2018-01-19 RX ORDER — AZITHROMYCIN 250 MG/1
TABLET, FILM COATED ORAL
Qty: 1 PACKET | Refills: 0 | Status: SHIPPED | OUTPATIENT
Start: 2018-01-19 | End: 2018-01-29

## 2018-01-19 RX ORDER — METHYLPREDNISOLONE SODIUM SUCCINATE 125 MG/2ML
125 INJECTION, POWDER, LYOPHILIZED, FOR SOLUTION INTRAMUSCULAR; INTRAVENOUS ONCE
Status: COMPLETED | OUTPATIENT
Start: 2018-01-19 | End: 2018-01-19

## 2018-01-19 RX ORDER — GUAIFENESIN AND CODEINE PHOSPHATE 100; 10 MG/5ML; MG/5ML
5 SOLUTION ORAL 4 TIMES DAILY PRN
Qty: 140 ML | Refills: 0 | Status: SHIPPED | OUTPATIENT
Start: 2018-01-19 | End: 2018-02-14 | Stop reason: SDUPTHER

## 2018-01-19 RX ADMIN — METHYLPREDNISOLONE SODIUM SUCCINATE 125 MG: 125 INJECTION, POWDER, LYOPHILIZED, FOR SOLUTION INTRAMUSCULAR; INTRAVENOUS at 09:05

## 2018-01-19 ASSESSMENT — ENCOUNTER SYMPTOMS
PHOTOPHOBIA: 0
ABDOMINAL PAIN: 0
CHEST TIGHTNESS: 1
SORE THROAT: 0
FACIAL SWELLING: 0
DIARRHEA: 0
VOMITING: 0
COUGH: 1
APNEA: 1
WHEEZING: 0
CHEST TIGHTNESS: 1
SHORTNESS OF BREATH: 1
BACK PAIN: 1
ABDOMINAL PAIN: 1
EYE REDNESS: 0
VOMITING: 0
DIARRHEA: 0
CONSTIPATION: 0
COUGH: 1
BLOOD IN STOOL: 0
ABDOMINAL DISTENTION: 0
NAUSEA: 0
SHORTNESS OF BREATH: 1
NAUSEA: 0

## 2018-01-19 NOTE — FLOWSHEET NOTE
01/18/18 [id number]   Encounter Summary   Services provided to: Patient   Referral/Consult From: Rounding   Continue Visiting (1/18/18)   Complexity of Encounter Low   Routine   Type Initial   Outcome Refused/declined

## 2018-01-19 NOTE — PROGRESS NOTES
Chief Complaint   Patient presents with    Leg Swelling     pt still apprears to have cellulitis bilaterally    COPD     patient seen at er last night for breathing problems    ED Follow-up     1/18/18       Here for short follow up for after ED visit . Leg Swelling (pt still apprears to have cellulitis bilaterally); COPD (patient seen at er last night for breathing problems); and ED Follow-up (1/18/18)        Currently patient complains of shortness of breath and cough     He was seen in ED on 1/18/18 for COPD exacerbation and was given breathing treatment, Solumedrol, Tamiflu and prescription for Prednisone at discharge. Labs done in ED: normal WBC, negative trops, slightly worsening Chronic kidney disease stage 3 and anemia. CXR was negative for infiltrates or CHF. EKG stable, with chronic changes    Today, reports that he is still SOB with usual activities and right now, at rest, after just walking from the parking lot to us, he is dyspneic at rest.  Reports hypoxia and pulse ox drops to 83-82% when walking. Uses Oxygen 2L/min at home, but doesn't have portable oxygen. Using nebulizer machine 2 x /day. Says that when using oxygen at home, the pulse ox stays at 95%. Reports compliance with inhalers. Miguel reports associated lower chest pain from coughing, productive cough with yellowish sputum, congestion, chest tightness and wheezing. Onset over 1 week ago    Says everyone's is sick at home  He quit smoking 7/10/17. Has known diabetes mellitus type 2  Follows with Dr. Cain Miller. Home BG readings: 100's-183, mostly 140's  Takes Insulin regular human 500, 40 units in am+30 units at noon+15-20 at nighttime  He is also on Januvia  He is on Lisinopril , statin and aspirin     A1c is now well controlled.   Lab Results   Component Value Date    LABA1C 7.6 11/15/2017     Lab Results   Component Value Date     03/23/2017       Urine microabumin is within normal limits    Microalb, Ur 2 times daily for 5 days 10 capsule 0    oxyCODONE HCl (OXY-IR) 10 MG immediate release tablet Take 1 tablet by mouth every 8 hours as needed for Pain for up to 30 days. 90 tablet 0    STOOL SOFTENER 100 MG capsule TAKE ONE CAPSULE BY MOUTH TWICE A DAY AS NEEDED FOR CONSTIPATION 60 capsule 2    metoprolol tartrate (LOPRESSOR) 50 MG tablet Take 1 tablet by mouth 2 times daily 180 tablet 3    Ferrous Sulfate (IRON) 325 (65 Fe) MG TABS Take 1 tablet by mouth daily 90 tablet 3    fluticasone-salmeterol (ADVAIR HFA) 230-21 MCG/ACT inhaler Inhale 2 puffs into the lungs 2 times daily 12 g 11    tiotropium (SPIRIVA HANDIHALER) 18 MCG inhalation capsule Inhale 1 capsule into the lungs daily 60 capsule 11    albuterol (PROVENTIL) (2.5 MG/3ML) 0.083% nebulizer solution Take 3 mLs by nebulization every 6 hours as needed for Wheezing or Shortness of Breath 100 each 3    mupirocin (BACTROBAN) 2 % ointment Apply topically 3 times daily on the affected area 1 Tube 0    metolazone (ZAROXOLYN) 2.5 MG tablet Take 1 tablet by mouth once a week On tuesday 12 tablet 0    lisinopril (PRINIVIL;ZESTRIL) 5 MG tablet Take 1 tablet by mouth daily . Discontinued Lisinopril  10 mg 90 tablet 3    venlafaxine (EFFEXOR) 75 MG tablet TAKE ONE TABLET BY MOUTH TWICE A DAY *CALL FOR REFILL OR DOSE INCREASE IF TOLERATED. STOP TAKING PROZAC* 60 tablet 5    CIPRODEX 0.3-0.1 % otic suspension       SITagliptin (JANUVIA) 25 MG tablet Take 1 tablet by mouth daily 30 tablet 3    ammonium lactate (LAC-HYDRIN) 12 % lotion Apply topically daily on the legs.  567 g 2    PROAIR  (90 Base) MCG/ACT inhaler INHALE TWO PUFFS BY MOUTH EVERY 6 HOURS AS NEEDED FOR WHEEZING OR FOR SHORTNESS OF BREATH 18 g 5    tiZANidine (ZANAFLEX) 4 MG tablet TAKE ONE TABLET BY MOUTH EVERY 8 HOURS AS NEEDED FOR BACK PAIN 90 tablet 5    miconazole (MICOTIN) 2 % powder Apply topically 2 times daily axillary for rash 85 g 3    bumetanide (BUMEX) 1 MG tablet Take 3 Snuff     Quit date: 6/22/1995      Comment: pt smoked 3 ppd x 30 years, now 1ppd    Alcohol use No    Drug use: Yes     Types: Marijuana      Comment: drug abuse includes cannabis    Sexual activity: Not Currently     Other Topics Concern    None     Social History Narrative    Middle of possible separation 6/22/2016          Counseling given: Yes      The patient's past medical, surgical, social, and family history as well as his current medications and allergies were reviewed as documented in today's encounter. Rest of complaints with corresponding details per ROS. Review of Systems   Constitutional: Positive for fatigue and unexpected weight change. Negative for activity change, appetite change, chills, diaphoresis and fever. HENT: Positive for congestion. Respiratory: Positive for apnea (on BiPAP), cough (with yellow sputum), chest tightness, shortness of breath and wheezing. Cardiovascular: Positive for chest pain (when coughing and with pressure) and leg swelling. Negative for palpitations. Gastrointestinal: Positive for abdominal pain (upper abdomen). Negative for abdominal distention, constipation, diarrhea, nausea and vomiting. Endocrine: Negative for cold intolerance, heat intolerance, polydipsia, polyphagia and polyuria. Genitourinary: Positive for frequency (from diuretics). Musculoskeletal: Positive for back pain and myalgias (legs). Skin: Positive for rash (legs). Allergic/Immunologic: Positive for immunocompromised state. Neurological: Positive for numbness. Psychiatric/Behavioral: Positive for dysphoric mood and sleep disturbance. Negative for self-injury and suicidal ideas. The patient is nervous/anxious. Physical Exam   Constitutional: He is oriented to person, place, and time. He appears well-developed and well-nourished. He appears distressed. Dypneic at rest when pt arrived, with shallow breathing.   Noticed improvement after receiving the methylPREDNISolone sodium (SOLU-MEDROL) injection 125 mg; Inject 2 mLs into the muscle once  Patient was given Solumedrol in the office. Patient was observed in the office for 15 minutes after the administration of the medication. No reaction noted, increased air entry bilateral lungs, patient reported improved breathing       2. COPD exacerbation ([address]  Not improving   - guaiFENesin-codeine (TUSSI-ORGANIDIN NR) 100-10 MG/5ML syrup; Take 5 mLs by mouth 4 times daily as needed for Cough or Congestion for up to 7 days. Dispense: 140 mL; Refill: 0  - azithromycin (ZITHROMAX) 250 MG tablet; Take 2 tabs on day 1, then 1 tab on days 2-5  Dispense: 1 packet; Refill: 0  - IN OFFICE: methylPREDNISolone sodium (SOLU-MEDROL) injection 125 mg; Inject 2 mLs into the muscle once    USE nebulizer treatments every 4-6 hrs until getting better, then resume every 6-8 hrs   Increase Oxygen to 3 L/min and try to stay on it more  Skip prednisone today as we gave you steroids in the office  You can take one more Bumex 1 mg tablet today   Call pulmonology for portable oxygen  Call me or Mychart me at the last prednisone if don't feel better, and I will order more prednisone for you    If high Blood Glucose over 200's, increase lunch 40+35+15/20    Joaquina called pulmonology office for portable oxygen and was told that they are planning to send someone to his house for oxygen monitoring and evaluation for portable oxygen      3. Type 2 diabetes mellitus with diabetic neuropathy, with long-term current use of insulin (HCC)  Controlled  - insulin regular human (HUMULIN R U-500) 500 UNIT/ML concentrated injection vial; **U-500** draw back to 0.2 on a U-100 1mL syringe (= 100 units of U-500) at each dose . 40 units in am+30 units at noon+15-20 at nighttime  Dispense: 20 mL; Refill: 0  Continue Januvia  Continue home Blood Glucose monitoring 2-3 times a day  Daily foot exam  Annual eye exam  Continue aspirin, ACEI and statin    4.  Chronic

## 2018-01-20 PROBLEM — F33.2 MDD (MAJOR DEPRESSIVE DISORDER), RECURRENT SEVERE, WITHOUT PSYCHOSIS (HCC): Status: RESOLVED | Noted: 2017-07-05 | Resolved: 2018-01-20

## 2018-01-20 ASSESSMENT — ENCOUNTER SYMPTOMS: WHEEZING: 1

## 2018-01-22 DIAGNOSIS — K21.9 GASTROESOPHAGEAL REFLUX DISEASE WITHOUT ESOPHAGITIS: ICD-10-CM

## 2018-01-22 RX ORDER — PANTOPRAZOLE SODIUM 40 MG/1
40 TABLET, DELAYED RELEASE ORAL NIGHTLY
Qty: 90 TABLET | Refills: 3 | Status: SHIPPED | OUTPATIENT
Start: 2018-01-22 | End: 2019-01-21 | Stop reason: SDUPTHER

## 2018-01-28 DIAGNOSIS — Z29.9 PREVENTIVE MEASURE: ICD-10-CM

## 2018-01-29 RX ORDER — CLOPIDOGREL BISULFATE 75 MG/1
TABLET ORAL
Qty: 90 TABLET | Refills: 2 | Status: SHIPPED | OUTPATIENT
Start: 2018-01-29 | End: 2018-11-05 | Stop reason: SDUPTHER

## 2018-01-29 NOTE — TELEPHONE ENCOUNTER
Chronic midline low back pain with left-sided sciatica     Stasis dermatitis of both legs     Anxiety     Positive depression screening     Moderate recurrent major depression (HCC)     COPD exacerbation (HCC)     History of recurrent ear infection     Anemia     Type 2 diabetes mellitus with stage 3 chronic kidney disease, with long-term current use of insulin (HCC)     Mixed conductive and sensorineural hearing loss of both ears     Tinnitus of both ears     Adhesive capsulitis of left shoulder     Slow transit constipation     Chronic otitis externa of right ear     Bilateral leg edema     Tinea pedis of both feet     Onychomycosis     Intolerance of continuous positive airway pressure (CPAP) ventilation     Dry skin dermatitis legs

## 2018-02-11 DIAGNOSIS — E55.9 VITAMIN D DEFICIENCY: ICD-10-CM

## 2018-02-14 ENCOUNTER — PATIENT MESSAGE (OUTPATIENT)
Dept: FAMILY MEDICINE CLINIC | Age: 53
End: 2018-02-14

## 2018-02-14 DIAGNOSIS — G47.01 INSOMNIA DUE TO MEDICAL CONDITION: ICD-10-CM

## 2018-02-14 DIAGNOSIS — R05.3 CHRONIC COUGH: Primary | ICD-10-CM

## 2018-02-14 DIAGNOSIS — G89.29 CHRONIC MIDLINE LOW BACK PAIN WITH LEFT-SIDED SCIATICA: ICD-10-CM

## 2018-02-14 DIAGNOSIS — I50.32 CHRONIC DIASTOLIC CONGESTIVE HEART FAILURE (HCC): ICD-10-CM

## 2018-02-14 DIAGNOSIS — M54.42 CHRONIC MIDLINE LOW BACK PAIN WITH LEFT-SIDED SCIATICA: ICD-10-CM

## 2018-02-14 RX ORDER — GUAIFENESIN AND CODEINE PHOSPHATE 100; 10 MG/5ML; MG/5ML
5 SOLUTION ORAL 4 TIMES DAILY PRN
Qty: 140 ML | Refills: 0 | Status: SHIPPED | OUTPATIENT
Start: 2018-02-14 | End: 2018-02-21

## 2018-02-14 RX ORDER — OXYCODONE HYDROCHLORIDE 10 MG/1
10 TABLET ORAL EVERY 8 HOURS PRN
Qty: 90 TABLET | Refills: 0 | Status: SHIPPED | OUTPATIENT
Start: 2018-02-14 | End: 2018-03-15 | Stop reason: SDUPTHER

## 2018-02-15 ENCOUNTER — HOSPITAL ENCOUNTER (OUTPATIENT)
Dept: OTHER | Age: 53
Discharge: HOME | End: 2018-02-15
Payer: COMMERCIAL

## 2018-02-15 ENCOUNTER — PATIENT MESSAGE (OUTPATIENT)
Dept: FAMILY MEDICINE CLINIC | Age: 53
End: 2018-02-15

## 2018-02-15 VITALS
HEART RATE: 82 BPM | WEIGHT: 315 LBS | BODY MASS INDEX: 51.51 KG/M2 | DIASTOLIC BLOOD PRESSURE: 68 MMHG | OXYGEN SATURATION: 97 % | SYSTOLIC BLOOD PRESSURE: 122 MMHG | RESPIRATION RATE: 24 BRPM

## 2018-02-15 PROCEDURE — 99211 OFF/OP EST MAY X REQ PHY/QHP: CPT

## 2018-02-16 ENCOUNTER — TELEPHONE (OUTPATIENT)
Dept: FAMILY MEDICINE CLINIC | Age: 53
End: 2018-02-16

## 2018-02-20 ENCOUNTER — TELEPHONE (OUTPATIENT)
Dept: FAMILY MEDICINE CLINIC | Age: 53
End: 2018-02-20

## 2018-02-22 ENCOUNTER — HOSPITAL ENCOUNTER (OUTPATIENT)
Dept: OTHER | Age: 53
Discharge: HOME | End: 2018-02-22
Payer: COMMERCIAL

## 2018-02-22 VITALS
BODY MASS INDEX: 52.01 KG/M2 | HEART RATE: 92 BPM | WEIGHT: 315 LBS | DIASTOLIC BLOOD PRESSURE: 60 MMHG | SYSTOLIC BLOOD PRESSURE: 112 MMHG | OXYGEN SATURATION: 95 % | RESPIRATION RATE: 20 BRPM

## 2018-02-22 LAB
ANION GAP SERPL CALCULATED.3IONS-SCNC: 11 MMOL/L (ref 9–17)
BNP INTERPRETATION: NORMAL
BUN BLDV-MCNC: 30 MG/DL (ref 6–20)
CHLORIDE BLD-SCNC: 99 MMOL/L (ref 98–107)
CO2: 25 MMOL/L (ref 20–31)
CREAT SERPL-MCNC: 1.46 MG/DL (ref 0.7–1.2)
GFR AFRICAN AMERICAN: >60 ML/MIN
GFR NON-AFRICAN AMERICAN: 51 ML/MIN
GFR SERPL CREATININE-BSD FRML MDRD: ABNORMAL ML/MIN/{1.73_M2}
GFR SERPL CREATININE-BSD FRML MDRD: ABNORMAL ML/MIN/{1.73_M2}
POTASSIUM SERPL-SCNC: 4.6 MMOL/L (ref 3.7–5.3)
PRO-BNP: 69 PG/ML
SODIUM BLD-SCNC: 135 MMOL/L (ref 135–144)

## 2018-02-22 PROCEDURE — 99211 OFF/OP EST MAY X REQ PHY/QHP: CPT

## 2018-02-22 PROCEDURE — 82565 ASSAY OF CREATININE: CPT

## 2018-02-22 PROCEDURE — 80051 ELECTROLYTE PANEL: CPT

## 2018-02-22 PROCEDURE — 36415 COLL VENOUS BLD VENIPUNCTURE: CPT

## 2018-02-22 PROCEDURE — 84520 ASSAY OF UREA NITROGEN: CPT

## 2018-02-22 PROCEDURE — 83880 ASSAY OF NATRIURETIC PEPTIDE: CPT

## 2018-02-25 DIAGNOSIS — K59.01 SLOW TRANSIT CONSTIPATION: ICD-10-CM

## 2018-02-26 RX ORDER — DOCUSATE SODIUM 100 MG
CAPSULE ORAL
Qty: 180 CAPSULE | Refills: 3 | Status: SHIPPED | OUTPATIENT
Start: 2018-02-26 | End: 2019-02-25 | Stop reason: SDUPTHER

## 2018-03-08 ENCOUNTER — PATIENT MESSAGE (OUTPATIENT)
Dept: FAMILY MEDICINE CLINIC | Age: 53
End: 2018-03-08

## 2018-03-08 DIAGNOSIS — J11.1 INFLUENZA-LIKE SYNDROME: Primary | ICD-10-CM

## 2018-03-08 DIAGNOSIS — H92.03 EAR PAIN, BILATERAL: ICD-10-CM

## 2018-03-08 RX ORDER — OSELTAMIVIR PHOSPHATE 75 MG/1
75 CAPSULE ORAL 2 TIMES DAILY
Qty: 10 CAPSULE | Refills: 0 | Status: SHIPPED | OUTPATIENT
Start: 2018-03-08 | End: 2018-03-13

## 2018-03-08 RX ORDER — CIPROFLOXACIN AND DEXAMETHASONE 3; 1 MG/ML; MG/ML
4 SUSPENSION/ DROPS AURICULAR (OTIC) 2 TIMES DAILY
Qty: 1 BOTTLE | Refills: 0 | Status: ON HOLD | OUTPATIENT
Start: 2018-03-08 | End: 2018-10-03 | Stop reason: HOSPADM

## 2018-03-12 DIAGNOSIS — I50.32 CHRONIC DIASTOLIC CONGESTIVE HEART FAILURE (HCC): ICD-10-CM

## 2018-03-12 RX ORDER — SPIRONOLACTONE 50 MG/1
50 TABLET, FILM COATED ORAL DAILY
Qty: 90 TABLET | Refills: 3 | Status: SHIPPED | OUTPATIENT
Start: 2018-03-12 | End: 2019-03-17 | Stop reason: SDUPTHER

## 2018-03-12 NOTE — TELEPHONE ENCOUNTER
Lab Results   Component Value Date     02/22/2018    K 4.6 02/22/2018    CL 99 02/22/2018    CO2 25 02/22/2018    BUN 30 02/22/2018    CREATININE 1.46 02/22/2018    GLUCOSE 86 01/18/2018    CALCIUM 9.5 01/18/2018

## 2018-03-13 ENCOUNTER — TELEPHONE (OUTPATIENT)
Dept: PULMONOLOGY | Age: 53
End: 2018-03-13

## 2018-03-14 ENCOUNTER — PATIENT MESSAGE (OUTPATIENT)
Dept: FAMILY MEDICINE CLINIC | Age: 53
End: 2018-03-14

## 2018-03-14 DIAGNOSIS — M54.42 CHRONIC MIDLINE LOW BACK PAIN WITH LEFT-SIDED SCIATICA: ICD-10-CM

## 2018-03-14 DIAGNOSIS — G89.29 CHRONIC MIDLINE LOW BACK PAIN WITH LEFT-SIDED SCIATICA: ICD-10-CM

## 2018-03-14 DIAGNOSIS — G47.01 INSOMNIA DUE TO MEDICAL CONDITION: ICD-10-CM

## 2018-03-14 DIAGNOSIS — I50.32 CHRONIC DIASTOLIC CONGESTIVE HEART FAILURE (HCC): ICD-10-CM

## 2018-03-14 RX ORDER — ALPRAZOLAM 0.25 MG/1
TABLET ORAL
Qty: 60 TABLET | Refills: 0 | Status: SHIPPED | OUTPATIENT
Start: 2018-03-14 | End: 2018-03-15 | Stop reason: SDUPTHER

## 2018-03-15 ENCOUNTER — PATIENT MESSAGE (OUTPATIENT)
Dept: FAMILY MEDICINE CLINIC | Age: 53
End: 2018-03-15

## 2018-03-15 ENCOUNTER — HOSPITAL ENCOUNTER (OUTPATIENT)
Dept: OTHER | Age: 53
Discharge: HOME | End: 2018-03-15
Payer: COMMERCIAL

## 2018-03-15 VITALS
BODY MASS INDEX: 52.15 KG/M2 | RESPIRATION RATE: 24 BRPM | OXYGEN SATURATION: 96 % | HEART RATE: 80 BPM | SYSTOLIC BLOOD PRESSURE: 110 MMHG | WEIGHT: 315 LBS | DIASTOLIC BLOOD PRESSURE: 70 MMHG

## 2018-03-15 PROCEDURE — 99211 OFF/OP EST MAY X REQ PHY/QHP: CPT

## 2018-03-15 RX ORDER — ALPRAZOLAM 0.25 MG/1
0.25 TABLET ORAL 2 TIMES DAILY
Qty: 60 TABLET | Refills: 1 | Status: SHIPPED | OUTPATIENT
Start: 2018-03-15 | End: 2018-04-14

## 2018-03-15 RX ORDER — ALPRAZOLAM 0.25 MG/1
0.25 TABLET ORAL 2 TIMES DAILY
Qty: 60 TABLET | Refills: 0 | Status: CANCELLED | OUTPATIENT
Start: 2018-03-15 | End: 2018-04-14

## 2018-03-15 RX ORDER — OXYCODONE HYDROCHLORIDE 10 MG/1
10 TABLET ORAL EVERY 8 HOURS PRN
Qty: 90 TABLET | Refills: 0 | Status: SHIPPED | OUTPATIENT
Start: 2018-03-15 | End: 2018-04-11 | Stop reason: SDUPTHER

## 2018-03-15 NOTE — TELEPHONE ENCOUNTER
From: Zoë Tate.   To: Francia Fairchild MD  Sent: 3/15/2018 12:33 PM EDT  Subject: Prescription Question    My pharmacy said that they did not get any fax from your office

## 2018-03-15 NOTE — TELEPHONE ENCOUNTER
Dr Sarahi Monreal signed for the script outside of the office so it did not print in the office. I cancelled the old script and sent him a new one to sign for in the office.

## 2018-03-21 LAB
AVERAGE GLUCOSE: NORMAL
HBA1C MFR BLD: 7.9 %

## 2018-03-26 DIAGNOSIS — Z79.4 TYPE 2 DIABETES MELLITUS WITH DIABETIC NEUROPATHY, WITH LONG-TERM CURRENT USE OF INSULIN (HCC): ICD-10-CM

## 2018-03-26 DIAGNOSIS — E11.40 TYPE 2 DIABETES MELLITUS WITH DIABETIC NEUROPATHY, WITH LONG-TERM CURRENT USE OF INSULIN (HCC): ICD-10-CM

## 2018-03-26 RX ORDER — SITAGLIPTIN 25 MG/1
TABLET, FILM COATED ORAL
Qty: 90 TABLET | Refills: 3 | Status: SHIPPED | OUTPATIENT
Start: 2018-03-26 | End: 2018-11-15 | Stop reason: ALTCHOICE

## 2018-03-27 ENCOUNTER — PATIENT MESSAGE (OUTPATIENT)
Dept: FAMILY MEDICINE CLINIC | Age: 53
End: 2018-03-27

## 2018-03-27 ENCOUNTER — HOSPITAL ENCOUNTER (INPATIENT)
Age: 53
LOS: 2 days | Discharge: HOME | DRG: 191 | End: 2018-03-29
Attending: EMERGENCY MEDICINE | Admitting: INTERNAL MEDICINE
Payer: COMMERCIAL

## 2018-03-27 ENCOUNTER — APPOINTMENT (OUTPATIENT)
Dept: CT IMAGING | Age: 53
DRG: 191 | End: 2018-03-27
Payer: COMMERCIAL

## 2018-03-27 ENCOUNTER — APPOINTMENT (OUTPATIENT)
Dept: GENERAL RADIOLOGY | Age: 53
DRG: 191 | End: 2018-03-27
Payer: COMMERCIAL

## 2018-03-27 DIAGNOSIS — J44.1 COPD EXACERBATION (HCC): ICD-10-CM

## 2018-03-27 DIAGNOSIS — I20.0 UNSTABLE ANGINA (HCC): Primary | ICD-10-CM

## 2018-03-27 LAB
ABSOLUTE EOS #: 0.2 K/UL (ref 0–0.4)
ABSOLUTE IMMATURE GRANULOCYTE: ABNORMAL K/UL (ref 0–0.3)
ABSOLUTE LYMPH #: 1.4 K/UL (ref 1–4.8)
ABSOLUTE MONO #: 0.6 K/UL (ref 0.1–1.3)
ALBUMIN SERPL-MCNC: 3.9 G/DL (ref 3.5–5.2)
ALBUMIN/GLOBULIN RATIO: ABNORMAL (ref 1–2.5)
ALP BLD-CCNC: 79 U/L (ref 40–129)
ALT SERPL-CCNC: 16 U/L (ref 5–41)
ANION GAP SERPL CALCULATED.3IONS-SCNC: 13 MMOL/L (ref 9–17)
AST SERPL-CCNC: 13 U/L
BASOPHILS # BLD: 1 % (ref 0–2)
BASOPHILS ABSOLUTE: 0.1 K/UL (ref 0–0.2)
BILIRUB SERPL-MCNC: 0.4 MG/DL (ref 0.3–1.2)
BNP INTERPRETATION: NORMAL
BUN BLDV-MCNC: 24 MG/DL (ref 6–20)
BUN/CREAT BLD: ABNORMAL (ref 9–20)
CALCIUM SERPL-MCNC: 9.2 MG/DL (ref 8.6–10.4)
CHLORIDE BLD-SCNC: 98 MMOL/L (ref 98–107)
CO2: 25 MMOL/L (ref 20–31)
CREAT SERPL-MCNC: 1.26 MG/DL (ref 0.7–1.2)
DIFFERENTIAL TYPE: ABNORMAL
EOSINOPHILS RELATIVE PERCENT: 3 % (ref 0–4)
GFR AFRICAN AMERICAN: >60 ML/MIN
GFR NON-AFRICAN AMERICAN: 60 ML/MIN
GFR SERPL CREATININE-BSD FRML MDRD: ABNORMAL ML/MIN/{1.73_M2}
GFR SERPL CREATININE-BSD FRML MDRD: ABNORMAL ML/MIN/{1.73_M2}
GLUCOSE BLD-MCNC: 250 MG/DL (ref 75–110)
GLUCOSE BLD-MCNC: 302 MG/DL (ref 70–99)
HCT VFR BLD CALC: 39.4 % (ref 41–53)
HEMOGLOBIN: 12.7 G/DL (ref 13.5–17.5)
IMMATURE GRANULOCYTES: ABNORMAL %
LYMPHOCYTES # BLD: 16 % (ref 24–44)
MAGNESIUM: 1.6 MG/DL (ref 1.6–2.6)
MCH RBC QN AUTO: 26.8 PG (ref 26–34)
MCHC RBC AUTO-ENTMCNC: 32.2 G/DL (ref 31–37)
MCV RBC AUTO: 83.2 FL (ref 80–100)
MONOCYTES # BLD: 7 % (ref 1–7)
NRBC AUTOMATED: ABNORMAL PER 100 WBC
PDW BLD-RTO: 16.1 % (ref 11.5–14.9)
PLATELET # BLD: 200 K/UL (ref 150–450)
PLATELET ESTIMATE: ABNORMAL
PMV BLD AUTO: 9.1 FL (ref 6–12)
POTASSIUM SERPL-SCNC: 4.2 MMOL/L (ref 3.7–5.3)
PRO-BNP: 83 PG/ML
RBC # BLD: 4.73 M/UL (ref 4.5–5.9)
RBC # BLD: ABNORMAL 10*6/UL
SEG NEUTROPHILS: 73 % (ref 36–66)
SEGMENTED NEUTROPHILS ABSOLUTE COUNT: 6.2 K/UL (ref 1.3–9.1)
SODIUM BLD-SCNC: 136 MMOL/L (ref 135–144)
TOTAL PROTEIN: 7.2 G/DL (ref 6.4–8.3)
TROPONIN INTERP: NORMAL
TROPONIN INTERP: NORMAL
TROPONIN T: <0.03 NG/ML
TROPONIN T: <0.03 NG/ML
WBC # BLD: 8.5 K/UL (ref 3.5–11)
WBC # BLD: ABNORMAL 10*3/UL

## 2018-03-27 PROCEDURE — 71275 CT ANGIOGRAPHY CHEST: CPT

## 2018-03-27 PROCEDURE — 6370000000 HC RX 637 (ALT 250 FOR IP): Performed by: EMERGENCY MEDICINE

## 2018-03-27 PROCEDURE — 83880 ASSAY OF NATRIURETIC PEPTIDE: CPT

## 2018-03-27 PROCEDURE — 99285 EMERGENCY DEPT VISIT HI MDM: CPT

## 2018-03-27 PROCEDURE — 6360000002 HC RX W HCPCS: Performed by: EMERGENCY MEDICINE

## 2018-03-27 PROCEDURE — 6360000004 HC RX CONTRAST MEDICATION: Performed by: EMERGENCY MEDICINE

## 2018-03-27 PROCEDURE — 71045 X-RAY EXAM CHEST 1 VIEW: CPT

## 2018-03-27 PROCEDURE — 6360000002 HC RX W HCPCS: Performed by: INTERNAL MEDICINE

## 2018-03-27 PROCEDURE — 94640 AIRWAY INHALATION TREATMENT: CPT

## 2018-03-27 PROCEDURE — 1200000000 HC SEMI PRIVATE

## 2018-03-27 PROCEDURE — 84484 ASSAY OF TROPONIN QUANT: CPT

## 2018-03-27 PROCEDURE — 93005 ELECTROCARDIOGRAM TRACING: CPT

## 2018-03-27 PROCEDURE — 36415 COLL VENOUS BLD VENIPUNCTURE: CPT

## 2018-03-27 PROCEDURE — 82947 ASSAY GLUCOSE BLOOD QUANT: CPT

## 2018-03-27 PROCEDURE — 80053 COMPREHEN METABOLIC PANEL: CPT

## 2018-03-27 PROCEDURE — 85025 COMPLETE CBC W/AUTO DIFF WBC: CPT

## 2018-03-27 PROCEDURE — 83735 ASSAY OF MAGNESIUM: CPT

## 2018-03-27 PROCEDURE — 96374 THER/PROPH/DIAG INJ IV PUSH: CPT

## 2018-03-27 PROCEDURE — 2580000003 HC RX 258: Performed by: EMERGENCY MEDICINE

## 2018-03-27 PROCEDURE — 2580000003 HC RX 258: Performed by: INTERNAL MEDICINE

## 2018-03-27 RX ORDER — SODIUM CHLORIDE 9 MG/ML
INJECTION, SOLUTION INTRAVENOUS CONTINUOUS
Status: DISCONTINUED | OUTPATIENT
Start: 2018-03-27 | End: 2018-03-28

## 2018-03-27 RX ORDER — SODIUM CHLORIDE 0.9 % (FLUSH) 0.9 %
10 SYRINGE (ML) INJECTION PRN
Status: DISCONTINUED | OUTPATIENT
Start: 2018-03-27 | End: 2018-03-29 | Stop reason: HOSPADM

## 2018-03-27 RX ORDER — IPRATROPIUM BROMIDE AND ALBUTEROL SULFATE 2.5; .5 MG/3ML; MG/3ML
1 SOLUTION RESPIRATORY (INHALATION) ONCE
Status: COMPLETED | OUTPATIENT
Start: 2018-03-27 | End: 2018-03-27

## 2018-03-27 RX ORDER — ACETAMINOPHEN 325 MG/1
650 TABLET ORAL EVERY 4 HOURS PRN
Status: DISCONTINUED | OUTPATIENT
Start: 2018-03-27 | End: 2018-03-29 | Stop reason: HOSPADM

## 2018-03-27 RX ORDER — 0.9 % SODIUM CHLORIDE 0.9 %
100 INTRAVENOUS SOLUTION INTRAVENOUS ONCE
Status: COMPLETED | OUTPATIENT
Start: 2018-03-27 | End: 2018-03-27

## 2018-03-27 RX ORDER — ASPIRIN 81 MG/1
324 TABLET, CHEWABLE ORAL ONCE
Status: COMPLETED | OUTPATIENT
Start: 2018-03-27 | End: 2018-03-27

## 2018-03-27 RX ORDER — METHYLPREDNISOLONE SODIUM SUCCINATE 125 MG/2ML
125 INJECTION, POWDER, LYOPHILIZED, FOR SOLUTION INTRAMUSCULAR; INTRAVENOUS ONCE
Status: COMPLETED | OUTPATIENT
Start: 2018-03-27 | End: 2018-03-27

## 2018-03-27 RX ORDER — NITROGLYCERIN 0.4 MG/1
0.4 TABLET SUBLINGUAL ONCE
Status: COMPLETED | OUTPATIENT
Start: 2018-03-27 | End: 2018-03-27

## 2018-03-27 RX ORDER — SODIUM CHLORIDE 0.9 % (FLUSH) 0.9 %
10 SYRINGE (ML) INJECTION PRN
Status: DISCONTINUED | OUTPATIENT
Start: 2018-03-27 | End: 2018-03-29 | Stop reason: SDUPTHER

## 2018-03-27 RX ORDER — SODIUM CHLORIDE 0.9 % (FLUSH) 0.9 %
10 SYRINGE (ML) INJECTION EVERY 12 HOURS SCHEDULED
Status: DISCONTINUED | OUTPATIENT
Start: 2018-03-27 | End: 2018-03-29 | Stop reason: HOSPADM

## 2018-03-27 RX ADMIN — IPRATROPIUM BROMIDE AND ALBUTEROL SULFATE 1 AMPULE: .5; 3 SOLUTION RESPIRATORY (INHALATION) at 18:16

## 2018-03-27 RX ADMIN — IOPAMIDOL 100 ML: 755 INJECTION, SOLUTION INTRAVENOUS at 20:06

## 2018-03-27 RX ADMIN — NITROGLYCERIN 0.4 MG: 0.4 TABLET SUBLINGUAL at 18:29

## 2018-03-27 RX ADMIN — SODIUM CHLORIDE 100 ML: 9 INJECTION, SOLUTION INTRAVENOUS at 20:06

## 2018-03-27 RX ADMIN — Medication 10 ML: at 23:20

## 2018-03-27 RX ADMIN — Medication 10 ML: at 20:06

## 2018-03-27 RX ADMIN — ASPIRIN 81 MG 324 MG: 81 TABLET ORAL at 18:29

## 2018-03-27 RX ADMIN — SODIUM CHLORIDE: 9 INJECTION, SOLUTION INTRAVENOUS at 23:20

## 2018-03-27 RX ADMIN — METHYLPREDNISOLONE SODIUM SUCCINATE 125 MG: 125 INJECTION, POWDER, FOR SOLUTION INTRAMUSCULAR; INTRAVENOUS at 18:29

## 2018-03-27 RX ADMIN — ENOXAPARIN SODIUM 40 MG: 40 INJECTION SUBCUTANEOUS at 23:20

## 2018-03-27 RX ADMIN — IPRATROPIUM BROMIDE AND ALBUTEROL SULFATE 1 AMPULE: .5; 3 SOLUTION RESPIRATORY (INHALATION) at 20:45

## 2018-03-27 ASSESSMENT — ENCOUNTER SYMPTOMS
NAUSEA: 0
SHORTNESS OF BREATH: 1
CHEST TIGHTNESS: 0
SORE THROAT: 0
DIARRHEA: 0
COUGH: 1
VOMITING: 0
FACIAL SWELLING: 0
EYE REDNESS: 0
WHEEZING: 0
PHOTOPHOBIA: 0
BLOOD IN STOOL: 0
ABDOMINAL PAIN: 0

## 2018-03-27 ASSESSMENT — PAIN DESCRIPTION - ORIENTATION: ORIENTATION: MID

## 2018-03-27 ASSESSMENT — PAIN DESCRIPTION - PAIN TYPE: TYPE: ACUTE PAIN

## 2018-03-27 ASSESSMENT — PAIN DESCRIPTION - DESCRIPTORS: DESCRIPTORS: CRUSHING

## 2018-03-27 ASSESSMENT — PAIN DESCRIPTION - LOCATION: LOCATION: CHEST

## 2018-03-27 ASSESSMENT — PAIN SCALES - GENERAL
PAINLEVEL_OUTOF10: 8
PAINLEVEL_OUTOF10: 4
PAINLEVEL_OUTOF10: 7

## 2018-03-28 ENCOUNTER — APPOINTMENT (OUTPATIENT)
Dept: NUCLEAR MEDICINE | Age: 53
DRG: 191 | End: 2018-03-28
Payer: COMMERCIAL

## 2018-03-28 LAB
ANION GAP SERPL CALCULATED.3IONS-SCNC: 15 MMOL/L (ref 9–17)
BUN BLDV-MCNC: 27 MG/DL (ref 6–20)
BUN/CREAT BLD: ABNORMAL (ref 9–20)
CALCIUM SERPL-MCNC: 8.9 MG/DL (ref 8.6–10.4)
CHLORIDE BLD-SCNC: 98 MMOL/L (ref 98–107)
CO2: 22 MMOL/L (ref 20–31)
CREAT SERPL-MCNC: 1.31 MG/DL (ref 0.7–1.2)
EKG ATRIAL RATE: 88 BPM
EKG ATRIAL RATE: 93 BPM
EKG P AXIS: 53 DEGREES
EKG P AXIS: 60 DEGREES
EKG P-R INTERVAL: 146 MS
EKG P-R INTERVAL: 148 MS
EKG Q-T INTERVAL: 354 MS
EKG Q-T INTERVAL: 356 MS
EKG QRS DURATION: 114 MS
EKG QRS DURATION: 116 MS
EKG QTC CALCULATION (BAZETT): 430 MS
EKG QTC CALCULATION (BAZETT): 440 MS
EKG R AXIS: -27 DEGREES
EKG R AXIS: -29 DEGREES
EKG T AXIS: 84 DEGREES
EKG T AXIS: 85 DEGREES
EKG VENTRICULAR RATE: 88 BPM
EKG VENTRICULAR RATE: 93 BPM
GFR AFRICAN AMERICAN: >60 ML/MIN
GFR NON-AFRICAN AMERICAN: 57 ML/MIN
GFR SERPL CREATININE-BSD FRML MDRD: ABNORMAL ML/MIN/{1.73_M2}
GFR SERPL CREATININE-BSD FRML MDRD: ABNORMAL ML/MIN/{1.73_M2}
GLUCOSE BLD-MCNC: 383 MG/DL (ref 75–110)
GLUCOSE BLD-MCNC: 447 MG/DL (ref 70–99)
GLUCOSE BLD-MCNC: 447 MG/DL (ref 75–110)
GLUCOSE BLD-MCNC: 502 MG/DL (ref 75–110)
GLUCOSE BLD-MCNC: 524 MG/DL (ref 75–110)
GLUCOSE BLD-MCNC: 547 MG/DL (ref 75–110)
HCT VFR BLD CALC: 41 % (ref 41–53)
HEMOGLOBIN: 13.1 G/DL (ref 13.5–17.5)
MCH RBC QN AUTO: 26.8 PG (ref 26–34)
MCHC RBC AUTO-ENTMCNC: 32.1 G/DL (ref 31–37)
MCV RBC AUTO: 83.6 FL (ref 80–100)
NRBC AUTOMATED: ABNORMAL PER 100 WBC
PDW BLD-RTO: 16.5 % (ref 11.5–14.9)
PLATELET # BLD: 201 K/UL (ref 150–450)
PMV BLD AUTO: 9.2 FL (ref 6–12)
POTASSIUM SERPL-SCNC: 4.8 MMOL/L (ref 3.7–5.3)
RBC # BLD: 4.9 M/UL (ref 4.5–5.9)
SODIUM BLD-SCNC: 135 MMOL/L (ref 135–144)
WBC # BLD: 11.9 K/UL (ref 3.5–11)

## 2018-03-28 PROCEDURE — 6370000000 HC RX 637 (ALT 250 FOR IP): Performed by: INTERNAL MEDICINE

## 2018-03-28 PROCEDURE — 36415 COLL VENOUS BLD VENIPUNCTURE: CPT

## 2018-03-28 PROCEDURE — 94761 N-INVAS EAR/PLS OXIMETRY MLT: CPT

## 2018-03-28 PROCEDURE — 85027 COMPLETE CBC AUTOMATED: CPT

## 2018-03-28 PROCEDURE — 6360000002 HC RX W HCPCS: Performed by: INTERNAL MEDICINE

## 2018-03-28 PROCEDURE — A9500 TC99M SESTAMIBI: HCPCS | Performed by: INTERNAL MEDICINE

## 2018-03-28 PROCEDURE — 2580000003 HC RX 258: Performed by: INTERNAL MEDICINE

## 2018-03-28 PROCEDURE — 6370000000 HC RX 637 (ALT 250 FOR IP): Performed by: NURSE PRACTITIONER

## 2018-03-28 PROCEDURE — 99223 1ST HOSP IP/OBS HIGH 75: CPT | Performed by: INTERNAL MEDICINE

## 2018-03-28 PROCEDURE — 1200000000 HC SEMI PRIVATE

## 2018-03-28 PROCEDURE — 94664 DEMO&/EVAL PT USE INHALER: CPT

## 2018-03-28 PROCEDURE — 78452 HT MUSCLE IMAGE SPECT MULT: CPT

## 2018-03-28 PROCEDURE — 94640 AIRWAY INHALATION TREATMENT: CPT

## 2018-03-28 PROCEDURE — 93017 CV STRESS TEST TRACING ONLY: CPT

## 2018-03-28 PROCEDURE — 3430000000 HC RX DIAGNOSTIC RADIOPHARMACEUTICAL: Performed by: INTERNAL MEDICINE

## 2018-03-28 PROCEDURE — 80048 BASIC METABOLIC PNL TOTAL CA: CPT

## 2018-03-28 PROCEDURE — 93005 ELECTROCARDIOGRAM TRACING: CPT

## 2018-03-28 PROCEDURE — 82947 ASSAY GLUCOSE BLOOD QUANT: CPT

## 2018-03-28 PROCEDURE — 6360000002 HC RX W HCPCS: Performed by: NURSE PRACTITIONER

## 2018-03-28 RX ORDER — NITROGLYCERIN 0.4 MG/1
0.4 TABLET SUBLINGUAL EVERY 5 MIN PRN
Status: ACTIVE | OUTPATIENT
Start: 2018-03-28 | End: 2018-03-28

## 2018-03-28 RX ORDER — ONDANSETRON 2 MG/ML
4 INJECTION INTRAMUSCULAR; INTRAVENOUS EVERY 6 HOURS PRN
Status: DISCONTINUED | OUTPATIENT
Start: 2018-03-28 | End: 2018-03-29 | Stop reason: HOSPADM

## 2018-03-28 RX ORDER — NICOTINE 21 MG/24HR
1 PATCH, TRANSDERMAL 24 HOURS TRANSDERMAL DAILY PRN
Status: DISCONTINUED | OUTPATIENT
Start: 2018-03-28 | End: 2018-03-29 | Stop reason: HOSPADM

## 2018-03-28 RX ORDER — CLOPIDOGREL BISULFATE 75 MG/1
75 TABLET ORAL DAILY
Status: DISCONTINUED | OUTPATIENT
Start: 2018-03-28 | End: 2018-03-29 | Stop reason: HOSPADM

## 2018-03-28 RX ORDER — ALBUTEROL SULFATE 2.5 MG/3ML
2.5 SOLUTION RESPIRATORY (INHALATION)
Status: DISCONTINUED | OUTPATIENT
Start: 2018-03-28 | End: 2018-03-29 | Stop reason: HOSPADM

## 2018-03-28 RX ORDER — VENLAFAXINE 75 MG/1
75 TABLET ORAL 2 TIMES DAILY WITH MEALS
Status: DISCONTINUED | OUTPATIENT
Start: 2018-03-28 | End: 2018-03-29 | Stop reason: HOSPADM

## 2018-03-28 RX ORDER — BUMETANIDE 1 MG/1
3 TABLET ORAL DAILY
Status: DISCONTINUED | OUTPATIENT
Start: 2018-03-28 | End: 2018-03-29 | Stop reason: HOSPADM

## 2018-03-28 RX ORDER — METHYLPREDNISOLONE SODIUM SUCCINATE 125 MG/2ML
60 INJECTION, POWDER, LYOPHILIZED, FOR SOLUTION INTRAMUSCULAR; INTRAVENOUS EVERY 6 HOURS
Status: DISCONTINUED | OUTPATIENT
Start: 2018-03-28 | End: 2018-03-28

## 2018-03-28 RX ORDER — NICOTINE POLACRILEX 4 MG
15 LOZENGE BUCCAL PRN
Status: DISCONTINUED | OUTPATIENT
Start: 2018-03-28 | End: 2018-03-29 | Stop reason: HOSPADM

## 2018-03-28 RX ORDER — SPIRONOLACTONE 25 MG/1
50 TABLET ORAL DAILY
Status: DISCONTINUED | OUTPATIENT
Start: 2018-03-28 | End: 2018-03-29 | Stop reason: HOSPADM

## 2018-03-28 RX ORDER — DEXTROSE MONOHYDRATE 25 G/50ML
12.5 INJECTION, SOLUTION INTRAVENOUS PRN
Status: DISCONTINUED | OUTPATIENT
Start: 2018-03-28 | End: 2018-03-29 | Stop reason: HOSPADM

## 2018-03-28 RX ORDER — METOPROLOL TARTRATE 5 MG/5ML
2.5 INJECTION INTRAVENOUS PRN
Status: ACTIVE | OUTPATIENT
Start: 2018-03-28 | End: 2018-03-28

## 2018-03-28 RX ORDER — FERROUS SULFATE 325(65) MG
325 TABLET ORAL DAILY
Status: DISCONTINUED | OUTPATIENT
Start: 2018-03-28 | End: 2018-03-29 | Stop reason: HOSPADM

## 2018-03-28 RX ORDER — LISINOPRIL 5 MG/1
5 TABLET ORAL DAILY
Status: DISCONTINUED | OUTPATIENT
Start: 2018-03-28 | End: 2018-03-29 | Stop reason: HOSPADM

## 2018-03-28 RX ORDER — SODIUM CHLORIDE 9 MG/ML
INJECTION, SOLUTION INTRAVENOUS ONCE
Status: DISCONTINUED | OUTPATIENT
Start: 2018-03-28 | End: 2018-03-29 | Stop reason: HOSPADM

## 2018-03-28 RX ORDER — AMMONIUM LACTATE 12 G/100G
LOTION TOPICAL PRN
Status: DISCONTINUED | OUTPATIENT
Start: 2018-03-28 | End: 2018-03-29 | Stop reason: HOSPADM

## 2018-03-28 RX ORDER — DEXTROSE MONOHYDRATE 50 MG/ML
100 INJECTION, SOLUTION INTRAVENOUS PRN
Status: DISCONTINUED | OUTPATIENT
Start: 2018-03-28 | End: 2018-03-29 | Stop reason: HOSPADM

## 2018-03-28 RX ORDER — SODIUM CHLORIDE 0.9 % (FLUSH) 0.9 %
10 SYRINGE (ML) INJECTION PRN
Status: DISCONTINUED | OUTPATIENT
Start: 2018-03-28 | End: 2018-03-29 | Stop reason: SDUPTHER

## 2018-03-28 RX ORDER — METOLAZONE 2.5 MG/1
2.5 TABLET ORAL WEEKLY
Status: DISCONTINUED | OUTPATIENT
Start: 2018-03-28 | End: 2018-03-29 | Stop reason: HOSPADM

## 2018-03-28 RX ORDER — IPRATROPIUM BROMIDE AND ALBUTEROL SULFATE 2.5; .5 MG/3ML; MG/3ML
1 SOLUTION RESPIRATORY (INHALATION)
Status: DISCONTINUED | OUTPATIENT
Start: 2018-03-28 | End: 2018-03-29 | Stop reason: HOSPADM

## 2018-03-28 RX ORDER — ALPRAZOLAM 0.25 MG/1
0.25 TABLET ORAL 2 TIMES DAILY
Status: DISCONTINUED | OUTPATIENT
Start: 2018-03-28 | End: 2018-03-29 | Stop reason: HOSPADM

## 2018-03-28 RX ORDER — FLUTICASONE PROPIONATE 50 MCG
2 SPRAY, SUSPENSION (ML) NASAL DAILY
Status: DISCONTINUED | OUTPATIENT
Start: 2018-03-28 | End: 2018-03-29 | Stop reason: HOSPADM

## 2018-03-28 RX ORDER — ASPIRIN 81 MG/1
81 TABLET ORAL DAILY
Status: DISCONTINUED | OUTPATIENT
Start: 2018-03-28 | End: 2018-03-29 | Stop reason: HOSPADM

## 2018-03-28 RX ORDER — BISACODYL 10 MG
10 SUPPOSITORY, RECTAL RECTAL DAILY PRN
Status: DISCONTINUED | OUTPATIENT
Start: 2018-03-28 | End: 2018-03-29 | Stop reason: HOSPADM

## 2018-03-28 RX ORDER — METOPROLOL TARTRATE 5 MG/5ML
5 INJECTION INTRAVENOUS EVERY 4 HOURS PRN
Status: DISCONTINUED | OUTPATIENT
Start: 2018-03-28 | End: 2018-03-29 | Stop reason: HOSPADM

## 2018-03-28 RX ORDER — TIZANIDINE 4 MG/1
4 TABLET ORAL EVERY 8 HOURS PRN
Status: DISCONTINUED | OUTPATIENT
Start: 2018-03-28 | End: 2018-03-29 | Stop reason: HOSPADM

## 2018-03-28 RX ORDER — PANTOPRAZOLE SODIUM 40 MG/1
40 TABLET, DELAYED RELEASE ORAL NIGHTLY
Status: DISCONTINUED | OUTPATIENT
Start: 2018-03-28 | End: 2018-03-29 | Stop reason: HOSPADM

## 2018-03-28 RX ORDER — DOCUSATE SODIUM 100 MG/1
100 CAPSULE, LIQUID FILLED ORAL 2 TIMES DAILY PRN
Status: DISCONTINUED | OUTPATIENT
Start: 2018-03-28 | End: 2018-03-29 | Stop reason: HOSPADM

## 2018-03-28 RX ORDER — BUMETANIDE 1 MG/1
2 TABLET ORAL EVERY 24 HOURS
Status: DISCONTINUED | OUTPATIENT
Start: 2018-03-28 | End: 2018-03-29 | Stop reason: HOSPADM

## 2018-03-28 RX ORDER — AMINOPHYLLINE DIHYDRATE 25 MG/ML
100 INJECTION, SOLUTION INTRAVENOUS
Status: ACTIVE | OUTPATIENT
Start: 2018-03-28 | End: 2018-03-28

## 2018-03-28 RX ORDER — OXYCODONE HYDROCHLORIDE 5 MG/1
10 TABLET ORAL EVERY 8 HOURS PRN
Status: DISCONTINUED | OUTPATIENT
Start: 2018-03-28 | End: 2018-03-29 | Stop reason: HOSPADM

## 2018-03-28 RX ORDER — CIPROFLOXACIN AND DEXAMETHASONE 3; 1 MG/ML; MG/ML
4 SUSPENSION/ DROPS AURICULAR (OTIC) 2 TIMES DAILY
Status: DISCONTINUED | OUTPATIENT
Start: 2018-03-28 | End: 2018-03-29 | Stop reason: HOSPADM

## 2018-03-28 RX ORDER — NITROGLYCERIN 0.4 MG/1
0.4 TABLET SUBLINGUAL EVERY 5 MIN PRN
Status: DISCONTINUED | OUTPATIENT
Start: 2018-03-28 | End: 2018-03-29 | Stop reason: HOSPADM

## 2018-03-28 RX ORDER — METOPROLOL TARTRATE 50 MG/1
50 TABLET, FILM COATED ORAL 2 TIMES DAILY
Status: DISCONTINUED | OUTPATIENT
Start: 2018-03-28 | End: 2018-03-29 | Stop reason: HOSPADM

## 2018-03-28 RX ORDER — PRAVASTATIN SODIUM 40 MG
40 TABLET ORAL EVERY EVENING
Status: DISCONTINUED | OUTPATIENT
Start: 2018-03-28 | End: 2018-03-29 | Stop reason: HOSPADM

## 2018-03-28 RX ADMIN — ENOXAPARIN SODIUM 40 MG: 40 INJECTION SUBCUTANEOUS at 09:18

## 2018-03-28 RX ADMIN — VITAMIN D, TAB 1000IU (100/BT) 1000 UNITS: 25 TAB at 11:39

## 2018-03-28 RX ADMIN — OXYCODONE HYDROCHLORIDE 10 MG: 5 TABLET ORAL at 02:09

## 2018-03-28 RX ADMIN — OXYCODONE HYDROCHLORIDE 10 MG: 5 TABLET ORAL at 14:55

## 2018-03-28 RX ADMIN — BUMETANIDE 3 MG: 1 TABLET ORAL at 12:14

## 2018-03-28 RX ADMIN — METOPROLOL TARTRATE 50 MG: 50 TABLET ORAL at 23:00

## 2018-03-28 RX ADMIN — TETRAKIS(2-METHOXYISOBUTYLISOCYANIDE)COPPER(I) TETRAFLUOROBORATE 35.5 MILLICURIE: 1 INJECTION, POWDER, LYOPHILIZED, FOR SOLUTION INTRAVENOUS at 10:29

## 2018-03-28 RX ADMIN — OXYCODONE HYDROCHLORIDE 10 MG: 5 TABLET ORAL at 23:12

## 2018-03-28 RX ADMIN — MOMETASONE FUROATE AND FORMOTEROL FUMARATE DIHYDRATE 2 PUFF: 200; 5 AEROSOL RESPIRATORY (INHALATION) at 10:59

## 2018-03-28 RX ADMIN — IPRATROPIUM BROMIDE AND ALBUTEROL SULFATE 1 AMPULE: .5; 3 SOLUTION RESPIRATORY (INHALATION) at 08:43

## 2018-03-28 RX ADMIN — VENLAFAXINE 75 MG: 75 TABLET ORAL at 17:45

## 2018-03-28 RX ADMIN — IPRATROPIUM BROMIDE AND ALBUTEROL SULFATE 1 AMPULE: .5; 3 SOLUTION RESPIRATORY (INHALATION) at 14:57

## 2018-03-28 RX ADMIN — INSULIN LISPRO 10 UNITS: 100 INJECTION, SOLUTION INTRAVENOUS; SUBCUTANEOUS at 23:01

## 2018-03-28 RX ADMIN — Medication 10 ML: at 23:00

## 2018-03-28 RX ADMIN — INSULIN LISPRO 9 UNITS: 100 INJECTION, SOLUTION INTRAVENOUS; SUBCUTANEOUS at 20:18

## 2018-03-28 RX ADMIN — SPIRONOLACTONE 50 MG: 25 TABLET, FILM COATED ORAL at 09:19

## 2018-03-28 RX ADMIN — VENLAFAXINE 75 MG: 75 TABLET ORAL at 08:00

## 2018-03-28 RX ADMIN — ASPIRIN 81 MG: 81 TABLET, COATED ORAL at 09:19

## 2018-03-28 RX ADMIN — FERROUS SULFATE TAB 325 MG (65 MG ELEMENTAL FE) 325 MG: 325 (65 FE) TAB at 23:00

## 2018-03-28 RX ADMIN — METHYLPREDNISOLONE SODIUM SUCCINATE 60 MG: 125 INJECTION, POWDER, FOR SOLUTION INTRAMUSCULAR; INTRAVENOUS at 03:44

## 2018-03-28 RX ADMIN — ENOXAPARIN SODIUM 40 MG: 40 INJECTION SUBCUTANEOUS at 23:01

## 2018-03-28 RX ADMIN — METHYLPREDNISOLONE SODIUM SUCCINATE 60 MG: 125 INJECTION, POWDER, FOR SOLUTION INTRAMUSCULAR; INTRAVENOUS at 09:19

## 2018-03-28 RX ADMIN — CLOPIDOGREL BISULFATE 75 MG: 75 TABLET ORAL at 09:18

## 2018-03-28 RX ADMIN — INSULIN LISPRO 18 UNITS: 100 INJECTION, SOLUTION INTRAVENOUS; SUBCUTANEOUS at 12:12

## 2018-03-28 RX ADMIN — ALPRAZOLAM 0.25 MG: 0.25 TABLET ORAL at 23:00

## 2018-03-28 RX ADMIN — CIPROFLOXACIN AND DEXAMETHASONE 4 DROP: 3; 1 SUSPENSION/ DROPS AURICULAR (OTIC) at 23:03

## 2018-03-28 RX ADMIN — BUMETANIDE 2 MG: 1 TABLET ORAL at 17:45

## 2018-03-28 RX ADMIN — METHYLPREDNISOLONE SODIUM SUCCINATE 60 MG: 125 INJECTION, POWDER, FOR SOLUTION INTRAMUSCULAR; INTRAVENOUS at 17:45

## 2018-03-28 RX ADMIN — LISINOPRIL 5 MG: 5 TABLET ORAL at 09:18

## 2018-03-28 RX ADMIN — IPRATROPIUM BROMIDE AND ALBUTEROL SULFATE 1 AMPULE: .5; 3 SOLUTION RESPIRATORY (INHALATION) at 20:49

## 2018-03-28 RX ADMIN — REGADENOSON 0.4 MG: 0.08 INJECTION, SOLUTION INTRAVENOUS at 10:27

## 2018-03-28 RX ADMIN — PRAVASTATIN SODIUM 40 MG: 40 TABLET ORAL at 23:00

## 2018-03-28 RX ADMIN — MOMETASONE FUROATE AND FORMOTEROL FUMARATE DIHYDRATE 2 PUFF: 200; 5 AEROSOL RESPIRATORY (INHALATION) at 23:00

## 2018-03-28 RX ADMIN — METOPROLOL TARTRATE 50 MG: 50 TABLET ORAL at 09:19

## 2018-03-28 RX ADMIN — OXYCODONE HYDROCHLORIDE 10 MG: 5 TABLET ORAL at 09:19

## 2018-03-28 RX ADMIN — METOLAZONE 2.5 MG: 2.5 TABLET ORAL at 09:19

## 2018-03-28 RX ADMIN — Medication 10 ML: at 09:23

## 2018-03-28 RX ADMIN — Medication 10 ML: at 10:09

## 2018-03-28 RX ADMIN — INSULIN HUMAN 100 UNITS: 500 INJECTION, SOLUTION SUBCUTANEOUS at 17:41

## 2018-03-28 RX ADMIN — INSULIN LISPRO 18 UNITS: 100 INJECTION, SOLUTION INTRAVENOUS; SUBCUTANEOUS at 17:02

## 2018-03-28 RX ADMIN — PANTOPRAZOLE SODIUM 40 MG: 40 TABLET, DELAYED RELEASE ORAL at 23:00

## 2018-03-28 RX ADMIN — Medication 10 ML: at 10:27

## 2018-03-28 ASSESSMENT — ENCOUNTER SYMPTOMS
BACK PAIN: 1
BLURRED VISION: 0
ORTHOPNEA: 0
SPUTUM PRODUCTION: 1
COUGH: 1
VOMITING: 0
DIARRHEA: 0
NAUSEA: 0
CONSTIPATION: 0
WHEEZING: 1
SORE THROAT: 0
ABDOMINAL PAIN: 0
DOUBLE VISION: 0
SHORTNESS OF BREATH: 1

## 2018-03-28 ASSESSMENT — PAIN SCALES - GENERAL
PAINLEVEL_OUTOF10: 1
PAINLEVEL_OUTOF10: 9
PAINLEVEL_OUTOF10: 8
PAINLEVEL_OUTOF10: 7
PAINLEVEL_OUTOF10: 7
PAINLEVEL_OUTOF10: 1
PAINLEVEL_OUTOF10: 7
PAINLEVEL_OUTOF10: 0
PAINLEVEL_OUTOF10: 0
PAINLEVEL_OUTOF10: 1

## 2018-03-28 ASSESSMENT — PAIN DESCRIPTION - PAIN TYPE
TYPE: CHRONIC PAIN

## 2018-03-28 ASSESSMENT — PAIN DESCRIPTION - FREQUENCY
FREQUENCY: CONTINUOUS
FREQUENCY: INTERMITTENT
FREQUENCY: INTERMITTENT

## 2018-03-28 ASSESSMENT — PAIN DESCRIPTION - LOCATION
LOCATION: BACK
LOCATION: BACK;LEG
LOCATION: BACK;LEG
LOCATION: BACK

## 2018-03-28 ASSESSMENT — PAIN DESCRIPTION - DESCRIPTORS
DESCRIPTORS: ACHING
DESCRIPTORS: SHARP
DESCRIPTORS: ACHING

## 2018-03-28 ASSESSMENT — PAIN DESCRIPTION - ORIENTATION: ORIENTATION: RIGHT;LEFT

## 2018-03-29 VITALS
HEIGHT: 72 IN | BODY MASS INDEX: 42.66 KG/M2 | TEMPERATURE: 97.5 F | RESPIRATION RATE: 18 BRPM | HEART RATE: 73 BPM | WEIGHT: 315 LBS | OXYGEN SATURATION: 96 % | DIASTOLIC BLOOD PRESSURE: 79 MMHG | SYSTOLIC BLOOD PRESSURE: 125 MMHG

## 2018-03-29 LAB
ANION GAP SERPL CALCULATED.3IONS-SCNC: 18 MMOL/L (ref 9–17)
BUN BLDV-MCNC: 41 MG/DL (ref 6–20)
BUN/CREAT BLD: ABNORMAL (ref 9–20)
CALCIUM SERPL-MCNC: 9.9 MG/DL (ref 8.6–10.4)
CHLORIDE BLD-SCNC: 92 MMOL/L (ref 98–107)
CO2: 26 MMOL/L (ref 20–31)
CREAT SERPL-MCNC: 1.38 MG/DL (ref 0.7–1.2)
GFR AFRICAN AMERICAN: >60 ML/MIN
GFR NON-AFRICAN AMERICAN: 54 ML/MIN
GFR SERPL CREATININE-BSD FRML MDRD: ABNORMAL ML/MIN/{1.73_M2}
GFR SERPL CREATININE-BSD FRML MDRD: ABNORMAL ML/MIN/{1.73_M2}
GLUCOSE BLD-MCNC: 120 MG/DL (ref 75–110)
GLUCOSE BLD-MCNC: 260 MG/DL (ref 70–99)
GLUCOSE BLD-MCNC: 281 MG/DL (ref 75–110)
GLUCOSE BLD-MCNC: 322 MG/DL (ref 75–110)
HCT VFR BLD CALC: 41.3 % (ref 41–53)
HEMOGLOBIN: 13.3 G/DL (ref 13.5–17.5)
LV EF: 53 %
LVEF MODALITY: NORMAL
MCH RBC QN AUTO: 26.7 PG (ref 26–34)
MCHC RBC AUTO-ENTMCNC: 32.2 G/DL (ref 31–37)
MCV RBC AUTO: 83.2 FL (ref 80–100)
NRBC AUTOMATED: ABNORMAL PER 100 WBC
PDW BLD-RTO: 16.4 % (ref 11.5–14.9)
PLATELET # BLD: 243 K/UL (ref 150–450)
PMV BLD AUTO: 9.2 FL (ref 6–12)
POTASSIUM SERPL-SCNC: 4.4 MMOL/L (ref 3.7–5.3)
RBC # BLD: 4.96 M/UL (ref 4.5–5.9)
SODIUM BLD-SCNC: 136 MMOL/L (ref 135–144)
WBC # BLD: 14.4 K/UL (ref 3.5–11)

## 2018-03-29 PROCEDURE — 6360000002 HC RX W HCPCS: Performed by: INTERNAL MEDICINE

## 2018-03-29 PROCEDURE — 82947 ASSAY GLUCOSE BLOOD QUANT: CPT

## 2018-03-29 PROCEDURE — 99239 HOSP IP/OBS DSCHRG MGMT >30: CPT | Performed by: INTERNAL MEDICINE

## 2018-03-29 PROCEDURE — 6370000000 HC RX 637 (ALT 250 FOR IP): Performed by: INTERNAL MEDICINE

## 2018-03-29 PROCEDURE — 6370000000 HC RX 637 (ALT 250 FOR IP): Performed by: NURSE PRACTITIONER

## 2018-03-29 PROCEDURE — 80048 BASIC METABOLIC PNL TOTAL CA: CPT

## 2018-03-29 PROCEDURE — A9500 TC99M SESTAMIBI: HCPCS | Performed by: INTERNAL MEDICINE

## 2018-03-29 PROCEDURE — 3430000000 HC RX DIAGNOSTIC RADIOPHARMACEUTICAL: Performed by: INTERNAL MEDICINE

## 2018-03-29 PROCEDURE — 2580000003 HC RX 258: Performed by: INTERNAL MEDICINE

## 2018-03-29 PROCEDURE — 36415 COLL VENOUS BLD VENIPUNCTURE: CPT

## 2018-03-29 PROCEDURE — 94761 N-INVAS EAR/PLS OXIMETRY MLT: CPT

## 2018-03-29 PROCEDURE — 94640 AIRWAY INHALATION TREATMENT: CPT

## 2018-03-29 PROCEDURE — 85027 COMPLETE CBC AUTOMATED: CPT

## 2018-03-29 RX ORDER — DOXYCYCLINE HYCLATE 100 MG
100 TABLET ORAL 2 TIMES DAILY
Qty: 14 TABLET | Refills: 0 | Status: SHIPPED | OUTPATIENT
Start: 2018-03-29 | End: 2018-04-05

## 2018-03-29 RX ADMIN — SPIRONOLACTONE 50 MG: 25 TABLET, FILM COATED ORAL at 10:27

## 2018-03-29 RX ADMIN — BUMETANIDE 2 MG: 1 TABLET ORAL at 14:41

## 2018-03-29 RX ADMIN — VENLAFAXINE 75 MG: 75 TABLET ORAL at 12:23

## 2018-03-29 RX ADMIN — TETRAKIS(2-METHOXYISOBUTYLISOCYANIDE)COPPER(I) TETRAFLUOROBORATE 36.3 MILLICURIE: 1 INJECTION, POWDER, LYOPHILIZED, FOR SOLUTION INTRAVENOUS at 07:36

## 2018-03-29 RX ADMIN — IPRATROPIUM BROMIDE AND ALBUTEROL SULFATE 1 AMPULE: .5; 3 SOLUTION RESPIRATORY (INHALATION) at 11:32

## 2018-03-29 RX ADMIN — METOPROLOL TARTRATE 50 MG: 50 TABLET ORAL at 10:26

## 2018-03-29 RX ADMIN — INSULIN HUMAN 130 UNITS: 500 INJECTION, SOLUTION SUBCUTANEOUS at 12:18

## 2018-03-29 RX ADMIN — OXYCODONE HYDROCHLORIDE 10 MG: 5 TABLET ORAL at 10:26

## 2018-03-29 RX ADMIN — ALPRAZOLAM 0.25 MG: 0.25 TABLET ORAL at 10:26

## 2018-03-29 RX ADMIN — CLOPIDOGREL BISULFATE 75 MG: 75 TABLET ORAL at 10:26

## 2018-03-29 RX ADMIN — VITAMIN D, TAB 1000IU (100/BT) 1000 UNITS: 25 TAB at 10:25

## 2018-03-29 RX ADMIN — ASPIRIN 81 MG: 81 TABLET, COATED ORAL at 10:27

## 2018-03-29 RX ADMIN — FERROUS SULFATE TAB 325 MG (65 MG ELEMENTAL FE) 325 MG: 325 (65 FE) TAB at 10:25

## 2018-03-29 RX ADMIN — INSULIN HUMAN 120 UNITS: 500 INJECTION, SOLUTION SUBCUTANEOUS at 08:30

## 2018-03-29 RX ADMIN — IPRATROPIUM BROMIDE AND ALBUTEROL SULFATE 1 AMPULE: .5; 3 SOLUTION RESPIRATORY (INHALATION) at 07:45

## 2018-03-29 RX ADMIN — BUMETANIDE 3 MG: 1 TABLET ORAL at 10:26

## 2018-03-29 RX ADMIN — INSULIN LISPRO 9 UNITS: 100 INJECTION, SOLUTION INTRAVENOUS; SUBCUTANEOUS at 08:31

## 2018-03-29 RX ADMIN — TIZANIDINE 4 MG: 4 TABLET ORAL at 10:39

## 2018-03-29 RX ADMIN — Medication 10 ML: at 10:28

## 2018-03-29 RX ADMIN — MOMETASONE FUROATE AND FORMOTEROL FUMARATE DIHYDRATE 2 PUFF: 200; 5 AEROSOL RESPIRATORY (INHALATION) at 10:36

## 2018-03-29 RX ADMIN — ENOXAPARIN SODIUM 40 MG: 40 INJECTION SUBCUTANEOUS at 10:25

## 2018-03-29 RX ADMIN — LISINOPRIL 5 MG: 5 TABLET ORAL at 10:26

## 2018-03-29 ASSESSMENT — PAIN SCALES - GENERAL
PAINLEVEL_OUTOF10: 5
PAINLEVEL_OUTOF10: 7

## 2018-03-30 ENCOUNTER — TELEPHONE (OUTPATIENT)
Dept: FAMILY MEDICINE CLINIC | Age: 53
End: 2018-03-30

## 2018-03-30 LAB — GLUCOSE BLD-MCNC: 552 MG/DL (ref 75–110)

## 2018-04-11 ENCOUNTER — OFFICE VISIT (OUTPATIENT)
Dept: FAMILY MEDICINE CLINIC | Age: 53
End: 2018-04-11
Payer: COMMERCIAL

## 2018-04-11 VITALS
HEART RATE: 100 BPM | TEMPERATURE: 96 F | HEIGHT: 72 IN | DIASTOLIC BLOOD PRESSURE: 75 MMHG | OXYGEN SATURATION: 96 % | SYSTOLIC BLOOD PRESSURE: 167 MMHG | BODY MASS INDEX: 42.66 KG/M2 | WEIGHT: 315 LBS

## 2018-04-11 DIAGNOSIS — M54.42 CHRONIC MIDLINE LOW BACK PAIN WITH LEFT-SIDED SCIATICA: ICD-10-CM

## 2018-04-11 DIAGNOSIS — E11.40 TYPE 2 DIABETES MELLITUS WITH DIABETIC NEUROPATHY, WITH LONG-TERM CURRENT USE OF INSULIN (HCC): ICD-10-CM

## 2018-04-11 DIAGNOSIS — I10 ESSENTIAL HYPERTENSION: ICD-10-CM

## 2018-04-11 DIAGNOSIS — I50.32 CHRONIC DIASTOLIC CONGESTIVE HEART FAILURE (HCC): ICD-10-CM

## 2018-04-11 DIAGNOSIS — Z79.4 TYPE 2 DIABETES MELLITUS WITH DIABETIC NEUROPATHY, WITH LONG-TERM CURRENT USE OF INSULIN (HCC): ICD-10-CM

## 2018-04-11 DIAGNOSIS — N18.30 CKD (CHRONIC KIDNEY DISEASE) STAGE 3, GFR 30-59 ML/MIN (HCC): ICD-10-CM

## 2018-04-11 DIAGNOSIS — Z12.11 ENCOUNTER FOR SCREENING COLONOSCOPY: ICD-10-CM

## 2018-04-11 DIAGNOSIS — G89.29 CHRONIC MIDLINE LOW BACK PAIN WITH LEFT-SIDED SCIATICA: ICD-10-CM

## 2018-04-11 DIAGNOSIS — R07.2 PRECORDIAL PAIN: Primary | ICD-10-CM

## 2018-04-11 DIAGNOSIS — I25.118 CORONARY ARTERY DISEASE OF NATIVE ARTERY OF NATIVE HEART WITH STABLE ANGINA PECTORIS (HCC): ICD-10-CM

## 2018-04-11 DIAGNOSIS — J44.9 MIXED TYPE COPD (CHRONIC OBSTRUCTIVE PULMONARY DISEASE) (HCC): ICD-10-CM

## 2018-04-11 DIAGNOSIS — G47.01 INSOMNIA DUE TO MEDICAL CONDITION: ICD-10-CM

## 2018-04-11 PROCEDURE — 99495 TRANSJ CARE MGMT MOD F2F 14D: CPT | Performed by: FAMILY MEDICINE

## 2018-04-11 PROCEDURE — 1111F DSCHRG MED/CURRENT MED MERGE: CPT | Performed by: FAMILY MEDICINE

## 2018-04-11 RX ORDER — ALBUTEROL SULFATE 2.5 MG/3ML
2.5 SOLUTION RESPIRATORY (INHALATION) EVERY 6 HOURS PRN
Qty: 100 EACH | Refills: 3 | Status: SHIPPED | OUTPATIENT
Start: 2018-04-11 | End: 2018-10-23 | Stop reason: SDUPTHER

## 2018-04-11 RX ORDER — OXYCODONE HYDROCHLORIDE 10 MG/1
10 TABLET ORAL EVERY 8 HOURS PRN
Qty: 90 TABLET | Refills: 0 | Status: SHIPPED | OUTPATIENT
Start: 2018-04-11 | End: 2018-05-15 | Stop reason: SDUPTHER

## 2018-04-11 RX ORDER — NITROGLYCERIN 0.4 MG/1
0.4 TABLET SUBLINGUAL EVERY 5 MIN PRN
Qty: 25 TABLET | Refills: 3 | Status: SHIPPED | OUTPATIENT
Start: 2018-04-11 | End: 2020-02-13 | Stop reason: SDUPTHER

## 2018-04-11 RX ORDER — DOXYCYCLINE HYCLATE 100 MG/1
100 CAPSULE ORAL 2 TIMES DAILY
COMMUNITY
End: 2018-04-11 | Stop reason: ALTCHOICE

## 2018-04-11 RX ORDER — GABAPENTIN 100 MG/1
100 CAPSULE ORAL DAILY
Qty: 9 CAPSULE | Refills: 0 | Status: SHIPPED | OUTPATIENT
Start: 2018-04-11 | End: 2018-04-19 | Stop reason: SDUPTHER

## 2018-04-11 ASSESSMENT — ENCOUNTER SYMPTOMS
ABDOMINAL DISTENTION: 1
NAUSEA: 0
WHEEZING: 0
SHORTNESS OF BREATH: 1
BACK PAIN: 1
DIARRHEA: 0
CONSTIPATION: 0
VOMITING: 0
ABDOMINAL PAIN: 0
CHEST TIGHTNESS: 1
COUGH: 1
APNEA: 1

## 2018-04-19 ENCOUNTER — OFFICE VISIT (OUTPATIENT)
Dept: FAMILY MEDICINE CLINIC | Age: 53
End: 2018-04-19
Payer: COMMERCIAL

## 2018-04-19 VITALS
WEIGHT: 315 LBS | OXYGEN SATURATION: 97 % | HEIGHT: 72 IN | BODY MASS INDEX: 42.66 KG/M2 | DIASTOLIC BLOOD PRESSURE: 76 MMHG | HEART RATE: 82 BPM | TEMPERATURE: 98.9 F | SYSTOLIC BLOOD PRESSURE: 165 MMHG

## 2018-04-19 DIAGNOSIS — I50.32 CHRONIC DIASTOLIC CONGESTIVE HEART FAILURE (HCC): ICD-10-CM

## 2018-04-19 DIAGNOSIS — N18.30 CKD (CHRONIC KIDNEY DISEASE) STAGE 3, GFR 30-59 ML/MIN (HCC): ICD-10-CM

## 2018-04-19 DIAGNOSIS — G89.29 CHRONIC MIDLINE LOW BACK PAIN WITH LEFT-SIDED SCIATICA: ICD-10-CM

## 2018-04-19 DIAGNOSIS — Z79.4 TYPE 2 DIABETES MELLITUS WITH DIABETIC NEUROPATHY, WITH LONG-TERM CURRENT USE OF INSULIN (HCC): Primary | ICD-10-CM

## 2018-04-19 DIAGNOSIS — I25.118 CORONARY ARTERY DISEASE OF NATIVE ARTERY OF NATIVE HEART WITH STABLE ANGINA PECTORIS (HCC): ICD-10-CM

## 2018-04-19 DIAGNOSIS — E11.40 TYPE 2 DIABETES MELLITUS WITH DIABETIC NEUROPATHY, WITH LONG-TERM CURRENT USE OF INSULIN (HCC): Primary | ICD-10-CM

## 2018-04-19 DIAGNOSIS — M54.42 CHRONIC MIDLINE LOW BACK PAIN WITH LEFT-SIDED SCIATICA: ICD-10-CM

## 2018-04-19 PROCEDURE — 1036F TOBACCO NON-USER: CPT | Performed by: FAMILY MEDICINE

## 2018-04-19 PROCEDURE — 3017F COLORECTAL CA SCREEN DOC REV: CPT | Performed by: FAMILY MEDICINE

## 2018-04-19 PROCEDURE — G8598 ASA/ANTIPLAT THER USED: HCPCS | Performed by: FAMILY MEDICINE

## 2018-04-19 PROCEDURE — 99214 OFFICE O/P EST MOD 30 MIN: CPT | Performed by: FAMILY MEDICINE

## 2018-04-19 PROCEDURE — G8427 DOCREV CUR MEDS BY ELIG CLIN: HCPCS | Performed by: FAMILY MEDICINE

## 2018-04-19 PROCEDURE — 3045F PR MOST RECENT HEMOGLOBIN A1C LEVEL 7.0-9.0%: CPT | Performed by: FAMILY MEDICINE

## 2018-04-19 PROCEDURE — 1111F DSCHRG MED/CURRENT MED MERGE: CPT | Performed by: FAMILY MEDICINE

## 2018-04-19 PROCEDURE — 2022F DILAT RTA XM EVC RTNOPTHY: CPT | Performed by: FAMILY MEDICINE

## 2018-04-19 PROCEDURE — G8417 CALC BMI ABV UP PARAM F/U: HCPCS | Performed by: FAMILY MEDICINE

## 2018-04-19 RX ORDER — GABAPENTIN 300 MG/1
300 CAPSULE ORAL 2 TIMES DAILY
Qty: 60 CAPSULE | Refills: 0 | Status: SHIPPED | OUTPATIENT
Start: 2018-04-19 | End: 2018-10-02

## 2018-04-19 ASSESSMENT — ENCOUNTER SYMPTOMS
CONSTIPATION: 0
COUGH: 1
WHEEZING: 0
VOMITING: 0
ABDOMINAL PAIN: 0
NAUSEA: 0
ABDOMINAL DISTENTION: 1
CHEST TIGHTNESS: 1
APNEA: 1
BACK PAIN: 1
SHORTNESS OF BREATH: 1
DIARRHEA: 0

## 2018-04-23 ENCOUNTER — HOSPITAL ENCOUNTER (OUTPATIENT)
Dept: CARDIAC CATH/INVASIVE PROCEDURES | Age: 53
Discharge: HOME | End: 2018-04-23
Payer: COMMERCIAL

## 2018-04-23 VITALS
TEMPERATURE: 97.8 F | HEIGHT: 72 IN | RESPIRATION RATE: 18 BRPM | WEIGHT: 315 LBS | BODY MASS INDEX: 42.66 KG/M2 | HEART RATE: 91 BPM | OXYGEN SATURATION: 97 % | DIASTOLIC BLOOD PRESSURE: 68 MMHG | SYSTOLIC BLOOD PRESSURE: 170 MMHG

## 2018-04-23 LAB
GFR NON-AFRICAN AMERICAN: 56 ML/MIN
GFR SERPL CREATININE-BSD FRML MDRD: >60 ML/MIN
GFR SERPL CREATININE-BSD FRML MDRD: ABNORMAL ML/MIN/{1.73_M2}
GLUCOSE BLD-MCNC: 310 MG/DL (ref 74–100)
POC CHLORIDE: 104 MMOL/L (ref 98–107)
POC CREATININE: 1.34 MG/DL (ref 0.51–1.19)
POC HEMATOCRIT: 42 % (ref 41–53)
POC HEMOGLOBIN: 14.1 G/DL (ref 13.5–17.5)
POC POTASSIUM: 4.7 MMOL/L (ref 3.5–4.5)
POC SODIUM: 140 MMOL/L (ref 138–146)

## 2018-04-23 PROCEDURE — C1725 CATH, TRANSLUMIN NON-LASER: HCPCS

## 2018-04-23 PROCEDURE — C1894 INTRO/SHEATH, NON-LASER: HCPCS

## 2018-04-23 PROCEDURE — 2500000003 HC RX 250 WO HCPCS

## 2018-04-23 PROCEDURE — 84132 ASSAY OF SERUM POTASSIUM: CPT

## 2018-04-23 PROCEDURE — 2709999900 HC NON-CHARGEABLE SUPPLY

## 2018-04-23 PROCEDURE — 82565 ASSAY OF CREATININE: CPT

## 2018-04-23 PROCEDURE — C1769 GUIDE WIRE: HCPCS

## 2018-04-23 PROCEDURE — 7100000011 HC PHASE II RECOVERY - ADDTL 15 MIN

## 2018-04-23 PROCEDURE — 85014 HEMATOCRIT: CPT

## 2018-04-23 PROCEDURE — 82947 ASSAY GLUCOSE BLOOD QUANT: CPT

## 2018-04-23 PROCEDURE — 93458 L HRT ARTERY/VENTRICLE ANGIO: CPT | Performed by: INTERNAL MEDICINE

## 2018-04-23 PROCEDURE — 7100000010 HC PHASE II RECOVERY - FIRST 15 MIN

## 2018-04-23 PROCEDURE — 6360000004 HC RX CONTRAST MEDICATION

## 2018-04-23 PROCEDURE — 6360000002 HC RX W HCPCS

## 2018-04-23 PROCEDURE — 82435 ASSAY OF BLOOD CHLORIDE: CPT

## 2018-04-23 PROCEDURE — 84295 ASSAY OF SERUM SODIUM: CPT

## 2018-04-23 RX ORDER — SODIUM CHLORIDE 9 MG/ML
INJECTION, SOLUTION INTRAVENOUS CONTINUOUS
Status: CANCELLED | OUTPATIENT
Start: 2018-04-23 | End: 2018-04-26

## 2018-04-23 RX ORDER — ONDANSETRON 2 MG/ML
4 INJECTION INTRAMUSCULAR; INTRAVENOUS EVERY 6 HOURS PRN
Status: CANCELLED | OUTPATIENT
Start: 2018-04-23

## 2018-04-23 RX ORDER — SODIUM CHLORIDE 9 MG/ML
INJECTION, SOLUTION INTRAVENOUS CONTINUOUS
Status: DISCONTINUED | OUTPATIENT
Start: 2018-04-23 | End: 2018-04-24 | Stop reason: HOSPADM

## 2018-04-23 RX ORDER — SODIUM CHLORIDE 0.9 % (FLUSH) 0.9 %
10 SYRINGE (ML) INJECTION EVERY 12 HOURS SCHEDULED
Status: CANCELLED | OUTPATIENT
Start: 2018-04-23

## 2018-04-23 RX ORDER — ACETAMINOPHEN 325 MG/1
650 TABLET ORAL EVERY 4 HOURS PRN
Status: CANCELLED | OUTPATIENT
Start: 2018-04-23

## 2018-04-23 RX ORDER — SODIUM CHLORIDE 0.9 % (FLUSH) 0.9 %
10 SYRINGE (ML) INJECTION PRN
Status: CANCELLED | OUTPATIENT
Start: 2018-04-23

## 2018-04-23 RX ADMIN — SODIUM CHLORIDE: 9 INJECTION, SOLUTION INTRAVENOUS at 12:51

## 2018-04-30 ENCOUNTER — HOSPITAL ENCOUNTER (OUTPATIENT)
Dept: OTHER | Age: 53
Discharge: HOME | End: 2018-04-30
Payer: COMMERCIAL

## 2018-04-30 ENCOUNTER — PATIENT MESSAGE (OUTPATIENT)
Dept: FAMILY MEDICINE CLINIC | Age: 53
End: 2018-04-30

## 2018-04-30 ENCOUNTER — HOSPITAL ENCOUNTER (OUTPATIENT)
Age: 53
Discharge: HOME | End: 2018-04-30
Payer: COMMERCIAL

## 2018-04-30 VITALS
HEART RATE: 72 BPM | WEIGHT: 315 LBS | SYSTOLIC BLOOD PRESSURE: 136 MMHG | RESPIRATION RATE: 22 BRPM | BODY MASS INDEX: 52.07 KG/M2 | DIASTOLIC BLOOD PRESSURE: 76 MMHG | OXYGEN SATURATION: 98 %

## 2018-04-30 DIAGNOSIS — N18.30 BENIGN HYPERTENSION WITH CKD (CHRONIC KIDNEY DISEASE) STAGE III (HCC): ICD-10-CM

## 2018-04-30 DIAGNOSIS — N18.30 CKD (CHRONIC KIDNEY DISEASE) STAGE 3, GFR 30-59 ML/MIN (HCC): ICD-10-CM

## 2018-04-30 DIAGNOSIS — I77.1 CELIAC ARTERY STENOSIS (HCC): Primary | ICD-10-CM

## 2018-04-30 DIAGNOSIS — I12.9 BENIGN HYPERTENSION WITH CKD (CHRONIC KIDNEY DISEASE) STAGE III (HCC): ICD-10-CM

## 2018-04-30 DIAGNOSIS — E13.22 SECONDARY DIABETES MELLITUS WITH STAGE 3 CHRONIC KIDNEY DISEASE (HCC): ICD-10-CM

## 2018-04-30 DIAGNOSIS — N18.30 SECONDARY DIABETES MELLITUS WITH STAGE 3 CHRONIC KIDNEY DISEASE (HCC): ICD-10-CM

## 2018-04-30 LAB
ABSOLUTE EOS #: 0.2 K/UL (ref 0–0.4)
ABSOLUTE IMMATURE GRANULOCYTE: ABNORMAL K/UL (ref 0–0.3)
ABSOLUTE LYMPH #: 1.5 K/UL (ref 1–4.8)
ABSOLUTE MONO #: 0.6 K/UL (ref 0.1–1.3)
ANION GAP SERPL CALCULATED.3IONS-SCNC: 15 MMOL/L (ref 9–17)
BASOPHILS # BLD: 1 % (ref 0–2)
BASOPHILS ABSOLUTE: 0.1 K/UL (ref 0–0.2)
BUN BLDV-MCNC: 23 MG/DL (ref 6–20)
BUN/CREAT BLD: ABNORMAL (ref 9–20)
CALCIUM SERPL-MCNC: 9.3 MG/DL (ref 8.6–10.4)
CHLORIDE BLD-SCNC: 96 MMOL/L (ref 98–107)
CO2: 25 MMOL/L (ref 20–31)
CREAT SERPL-MCNC: 1.18 MG/DL (ref 0.7–1.2)
DIFFERENTIAL TYPE: ABNORMAL
EOSINOPHILS RELATIVE PERCENT: 2 % (ref 0–4)
GFR AFRICAN AMERICAN: >60 ML/MIN
GFR NON-AFRICAN AMERICAN: >60 ML/MIN
GFR SERPL CREATININE-BSD FRML MDRD: ABNORMAL ML/MIN/{1.73_M2}
GFR SERPL CREATININE-BSD FRML MDRD: ABNORMAL ML/MIN/{1.73_M2}
GLUCOSE BLD-MCNC: 246 MG/DL (ref 70–99)
HCT VFR BLD CALC: 41.7 % (ref 41–53)
HEMOGLOBIN: 13.7 G/DL (ref 13.5–17.5)
IMMATURE GRANULOCYTES: ABNORMAL %
LYMPHOCYTES # BLD: 17 % (ref 24–44)
MAGNESIUM: 1.8 MG/DL (ref 1.6–2.6)
MCH RBC QN AUTO: 27.5 PG (ref 26–34)
MCHC RBC AUTO-ENTMCNC: 32.9 G/DL (ref 31–37)
MCV RBC AUTO: 83.7 FL (ref 80–100)
MONOCYTES # BLD: 7 % (ref 1–7)
NRBC AUTOMATED: ABNORMAL PER 100 WBC
PDW BLD-RTO: 16.5 % (ref 11.5–14.9)
PHOSPHORUS: 3 MG/DL (ref 2.5–4.5)
PLATELET # BLD: 181 K/UL (ref 150–450)
PLATELET ESTIMATE: ABNORMAL
PMV BLD AUTO: 9 FL (ref 6–12)
POTASSIUM SERPL-SCNC: 4.5 MMOL/L (ref 3.7–5.3)
RBC # BLD: 4.98 M/UL (ref 4.5–5.9)
RBC # BLD: ABNORMAL 10*6/UL
SEG NEUTROPHILS: 73 % (ref 36–66)
SEGMENTED NEUTROPHILS ABSOLUTE COUNT: 6.3 K/UL (ref 1.3–9.1)
SODIUM BLD-SCNC: 136 MMOL/L (ref 135–144)
WBC # BLD: 8.6 K/UL (ref 3.5–11)
WBC # BLD: ABNORMAL 10*3/UL

## 2018-04-30 PROCEDURE — 85025 COMPLETE CBC W/AUTO DIFF WBC: CPT

## 2018-04-30 PROCEDURE — 83735 ASSAY OF MAGNESIUM: CPT

## 2018-04-30 PROCEDURE — 80048 BASIC METABOLIC PNL TOTAL CA: CPT

## 2018-04-30 PROCEDURE — 99211 OFF/OP EST MAY X REQ PHY/QHP: CPT

## 2018-04-30 PROCEDURE — 36415 COLL VENOUS BLD VENIPUNCTURE: CPT

## 2018-04-30 PROCEDURE — 84100 ASSAY OF PHOSPHORUS: CPT

## 2018-05-01 ENCOUNTER — PATIENT MESSAGE (OUTPATIENT)
Dept: FAMILY MEDICINE CLINIC | Age: 53
End: 2018-05-01

## 2018-05-01 PROBLEM — I77.1 CELIAC ARTERY STENOSIS (HCC): Status: ACTIVE | Noted: 2018-05-01

## 2018-05-03 DIAGNOSIS — M54.9 CHRONIC BACK PAIN GREATER THAN 3 MONTHS DURATION: ICD-10-CM

## 2018-05-03 DIAGNOSIS — F33.2 MDD (MAJOR DEPRESSIVE DISORDER), RECURRENT SEVERE, WITHOUT PSYCHOSIS (HCC): ICD-10-CM

## 2018-05-03 DIAGNOSIS — G47.01 INSOMNIA DUE TO MEDICAL CONDITION: ICD-10-CM

## 2018-05-03 DIAGNOSIS — F41.9 ANXIETY: ICD-10-CM

## 2018-05-03 DIAGNOSIS — G89.29 CHRONIC BACK PAIN GREATER THAN 3 MONTHS DURATION: ICD-10-CM

## 2018-05-03 RX ORDER — VENLAFAXINE 75 MG/1
75 TABLET ORAL 2 TIMES DAILY
Qty: 60 TABLET | Refills: 5 | Status: SHIPPED | OUTPATIENT
Start: 2018-05-03 | End: 2018-10-23 | Stop reason: SDUPTHER

## 2018-05-03 RX ORDER — TIZANIDINE 4 MG/1
TABLET ORAL
Qty: 90 TABLET | Refills: 5 | Status: SHIPPED | OUTPATIENT
Start: 2018-05-03 | End: 2019-01-23 | Stop reason: SDUPTHER

## 2018-05-07 ENCOUNTER — INITIAL CONSULT (OUTPATIENT)
Dept: VASCULAR SURGERY | Age: 53
End: 2018-05-07
Payer: COMMERCIAL

## 2018-05-07 ENCOUNTER — TELEPHONE (OUTPATIENT)
Dept: FAMILY MEDICINE CLINIC | Age: 53
End: 2018-05-07

## 2018-05-07 ENCOUNTER — PATIENT MESSAGE (OUTPATIENT)
Dept: FAMILY MEDICINE CLINIC | Age: 53
End: 2018-05-07

## 2018-05-07 VITALS
HEIGHT: 72 IN | BODY MASS INDEX: 42.66 KG/M2 | DIASTOLIC BLOOD PRESSURE: 78 MMHG | OXYGEN SATURATION: 97 % | WEIGHT: 315 LBS | SYSTOLIC BLOOD PRESSURE: 132 MMHG | RESPIRATION RATE: 24 BRPM | HEART RATE: 84 BPM

## 2018-05-07 DIAGNOSIS — I89.0 LYMPHEDEMA: ICD-10-CM

## 2018-05-07 DIAGNOSIS — I77.1 CELIAC ARTERY STENOSIS (HCC): Primary | ICD-10-CM

## 2018-05-07 DIAGNOSIS — R10.13 EPIGASTRIC PAIN: Primary | ICD-10-CM

## 2018-05-07 PROCEDURE — G8417 CALC BMI ABV UP PARAM F/U: HCPCS | Performed by: SURGERY

## 2018-05-07 PROCEDURE — G8427 DOCREV CUR MEDS BY ELIG CLIN: HCPCS | Performed by: SURGERY

## 2018-05-07 PROCEDURE — 3017F COLORECTAL CA SCREEN DOC REV: CPT | Performed by: SURGERY

## 2018-05-07 PROCEDURE — 99203 OFFICE O/P NEW LOW 30 MIN: CPT | Performed by: SURGERY

## 2018-05-07 ASSESSMENT — ENCOUNTER SYMPTOMS
SHORTNESS OF BREATH: 1
VOMITING: 0
EYES NEGATIVE: 1
COLOR CHANGE: 1
ALLERGIC/IMMUNOLOGIC NEGATIVE: 1
BACK PAIN: 1
DIARRHEA: 0
ABDOMINAL PAIN: 1

## 2018-05-17 DIAGNOSIS — E78.5 HYPERLIPIDEMIA WITH TARGET LDL LESS THAN 70: ICD-10-CM

## 2018-05-17 RX ORDER — PRAVASTATIN SODIUM 40 MG
40 TABLET ORAL EVERY EVENING
Qty: 90 TABLET | Refills: 3 | Status: SHIPPED | OUTPATIENT
Start: 2018-05-17 | End: 2018-10-02

## 2018-05-21 ENCOUNTER — PATIENT MESSAGE (OUTPATIENT)
Dept: FAMILY MEDICINE CLINIC | Age: 53
End: 2018-05-21

## 2018-05-21 DIAGNOSIS — M54.42 CHRONIC MIDLINE LOW BACK PAIN WITH LEFT-SIDED SCIATICA: ICD-10-CM

## 2018-05-21 DIAGNOSIS — G89.29 CHRONIC MIDLINE LOW BACK PAIN WITH LEFT-SIDED SCIATICA: ICD-10-CM

## 2018-05-21 DIAGNOSIS — G47.01 INSOMNIA DUE TO MEDICAL CONDITION: ICD-10-CM

## 2018-05-21 DIAGNOSIS — I50.32 CHRONIC DIASTOLIC CONGESTIVE HEART FAILURE (HCC): ICD-10-CM

## 2018-05-21 RX ORDER — OXYCODONE HYDROCHLORIDE 10 MG/1
10 TABLET ORAL EVERY 8 HOURS PRN
Qty: 90 TABLET | Refills: 0 | Status: SHIPPED | OUTPATIENT
Start: 2018-05-21 | End: 2018-06-19 | Stop reason: SDUPTHER

## 2018-05-30 ENCOUNTER — PATIENT MESSAGE (OUTPATIENT)
Dept: FAMILY MEDICINE CLINIC | Age: 53
End: 2018-05-30

## 2018-05-30 DIAGNOSIS — F17.200 SMOKES AND MOTIVATED TO QUIT: Primary | ICD-10-CM

## 2018-05-30 RX ORDER — NICOTINE 21 MG/24HR
1 PATCH, TRANSDERMAL 24 HOURS TRANSDERMAL EVERY 24 HOURS
Qty: 30 PATCH | Refills: 0 | Status: SHIPPED | OUTPATIENT
Start: 2018-05-30 | End: 2018-10-02

## 2018-05-30 RX ORDER — NICOTINE 21 MG/24HR
1 PATCH, TRANSDERMAL 24 HOURS TRANSDERMAL EVERY 24 HOURS
Qty: 30 PATCH | Refills: 3 | Status: SHIPPED | OUTPATIENT
Start: 2018-05-30 | End: 2019-03-28

## 2018-06-07 ENCOUNTER — HOSPITAL ENCOUNTER (OUTPATIENT)
Dept: OTHER | Age: 53
Discharge: HOME | End: 2018-06-07
Payer: MEDICAID

## 2018-06-07 VITALS
SYSTOLIC BLOOD PRESSURE: 138 MMHG | WEIGHT: 315 LBS | DIASTOLIC BLOOD PRESSURE: 78 MMHG | HEART RATE: 98 BPM | BODY MASS INDEX: 52.1 KG/M2 | RESPIRATION RATE: 20 BRPM

## 2018-06-07 LAB
ANION GAP SERPL CALCULATED.3IONS-SCNC: 13 MMOL/L (ref 9–17)
BNP INTERPRETATION: NORMAL
BUN BLDV-MCNC: 21 MG/DL (ref 6–20)
CHLORIDE BLD-SCNC: 98 MMOL/L (ref 98–107)
CO2: 25 MMOL/L (ref 20–31)
CREAT SERPL-MCNC: 1.23 MG/DL (ref 0.7–1.2)
GFR AFRICAN AMERICAN: >60 ML/MIN
GFR NON-AFRICAN AMERICAN: >60 ML/MIN
GFR SERPL CREATININE-BSD FRML MDRD: ABNORMAL ML/MIN/{1.73_M2}
GFR SERPL CREATININE-BSD FRML MDRD: ABNORMAL ML/MIN/{1.73_M2}
POTASSIUM SERPL-SCNC: 4.2 MMOL/L (ref 3.7–5.3)
PRO-BNP: 138 PG/ML
SODIUM BLD-SCNC: 136 MMOL/L (ref 135–144)

## 2018-06-07 PROCEDURE — 83880 ASSAY OF NATRIURETIC PEPTIDE: CPT

## 2018-06-07 PROCEDURE — 84520 ASSAY OF UREA NITROGEN: CPT

## 2018-06-07 PROCEDURE — 36415 COLL VENOUS BLD VENIPUNCTURE: CPT

## 2018-06-07 PROCEDURE — 99212 OFFICE O/P EST SF 10 MIN: CPT

## 2018-06-07 PROCEDURE — 80051 ELECTROLYTE PANEL: CPT

## 2018-06-07 PROCEDURE — 82565 ASSAY OF CREATININE: CPT

## 2018-06-25 ENCOUNTER — PATIENT MESSAGE (OUTPATIENT)
Dept: FAMILY MEDICINE CLINIC | Age: 53
End: 2018-06-25

## 2018-06-25 DIAGNOSIS — G47.01 INSOMNIA DUE TO MEDICAL CONDITION: ICD-10-CM

## 2018-06-25 DIAGNOSIS — I50.32 CHRONIC DIASTOLIC CONGESTIVE HEART FAILURE (HCC): ICD-10-CM

## 2018-06-25 DIAGNOSIS — G89.29 CHRONIC MIDLINE LOW BACK PAIN WITH LEFT-SIDED SCIATICA: ICD-10-CM

## 2018-06-25 DIAGNOSIS — M54.42 CHRONIC MIDLINE LOW BACK PAIN WITH LEFT-SIDED SCIATICA: ICD-10-CM

## 2018-06-25 RX ORDER — OXYCODONE HYDROCHLORIDE 10 MG/1
10 TABLET ORAL EVERY 8 HOURS PRN
Qty: 90 TABLET | Refills: 0 | Status: SHIPPED | OUTPATIENT
Start: 2018-06-25 | End: 2018-07-24 | Stop reason: SDUPTHER

## 2018-06-28 ENCOUNTER — OFFICE VISIT (OUTPATIENT)
Dept: GASTROENTEROLOGY | Age: 53
End: 2018-06-28
Payer: MEDICAID

## 2018-06-28 VITALS
OXYGEN SATURATION: 96 % | BODY MASS INDEX: 42.66 KG/M2 | DIASTOLIC BLOOD PRESSURE: 79 MMHG | HEIGHT: 72 IN | WEIGHT: 315 LBS | RESPIRATION RATE: 14 BRPM | HEART RATE: 82 BPM | SYSTOLIC BLOOD PRESSURE: 149 MMHG

## 2018-06-28 DIAGNOSIS — K21.9 GASTROESOPHAGEAL REFLUX DISEASE WITHOUT ESOPHAGITIS: ICD-10-CM

## 2018-06-28 DIAGNOSIS — K86.1 CHRONIC PANCREATITIS, UNSPECIFIED PANCREATITIS TYPE (HCC): ICD-10-CM

## 2018-06-28 DIAGNOSIS — K76.0 HEPATIC STEATOSIS: ICD-10-CM

## 2018-06-28 DIAGNOSIS — R13.14 PHARYNGOESOPHAGEAL DYSPHAGIA: Primary | ICD-10-CM

## 2018-06-28 DIAGNOSIS — K59.01 SLOW TRANSIT CONSTIPATION: ICD-10-CM

## 2018-06-28 PROCEDURE — G8417 CALC BMI ABV UP PARAM F/U: HCPCS | Performed by: INTERNAL MEDICINE

## 2018-06-28 PROCEDURE — 99204 OFFICE O/P NEW MOD 45 MIN: CPT | Performed by: INTERNAL MEDICINE

## 2018-06-28 PROCEDURE — G8427 DOCREV CUR MEDS BY ELIG CLIN: HCPCS | Performed by: INTERNAL MEDICINE

## 2018-06-28 PROCEDURE — 3017F COLORECTAL CA SCREEN DOC REV: CPT | Performed by: INTERNAL MEDICINE

## 2018-06-28 ASSESSMENT — ENCOUNTER SYMPTOMS
VOMITING: 0
CHEST TIGHTNESS: 1
ABDOMINAL DISTENTION: 1
BACK PAIN: 1
COUGH: 1
CONSTIPATION: 1
ABDOMINAL PAIN: 1
NAUSEA: 0
ALLERGIC/IMMUNOLOGIC NEGATIVE: 1
SHORTNESS OF BREATH: 1
APNEA: 1
RECTAL PAIN: 0
EYES NEGATIVE: 1
WHEEZING: 1
BLOOD IN STOOL: 1
DIARRHEA: 0
ANAL BLEEDING: 0

## 2018-06-29 LAB
AVERAGE GLUCOSE: NORMAL
HBA1C MFR BLD: 8.5 %

## 2018-07-05 DIAGNOSIS — E11.42 TYPE 2 DIABETES MELLITUS WITH DIABETIC POLYNEUROPATHY, WITH LONG-TERM CURRENT USE OF INSULIN (HCC): ICD-10-CM

## 2018-07-05 DIAGNOSIS — Z79.4 TYPE 2 DIABETES MELLITUS WITH DIABETIC POLYNEUROPATHY, WITH LONG-TERM CURRENT USE OF INSULIN (HCC): ICD-10-CM

## 2018-07-05 DIAGNOSIS — E11.40 TYPE 2 DIABETES MELLITUS WITH DIABETIC NEUROPATHY (HCC): ICD-10-CM

## 2018-07-05 RX ORDER — LANCETS
EACH MISCELLANEOUS
Qty: 100 EACH | Refills: 10 | Status: ON HOLD | OUTPATIENT
Start: 2018-07-05 | End: 2018-10-03 | Stop reason: HOSPADM

## 2018-07-06 ENCOUNTER — HOSPITAL ENCOUNTER (OUTPATIENT)
Dept: PREADMISSION TESTING | Age: 53
Discharge: HOME | End: 2018-07-10
Payer: COMMERCIAL

## 2018-07-06 VITALS
TEMPERATURE: 97.9 F | OXYGEN SATURATION: 96 % | HEIGHT: 72 IN | SYSTOLIC BLOOD PRESSURE: 177 MMHG | BODY MASS INDEX: 42.66 KG/M2 | WEIGHT: 315 LBS | HEART RATE: 78 BPM | RESPIRATION RATE: 16 BRPM | DIASTOLIC BLOOD PRESSURE: 62 MMHG

## 2018-07-06 LAB
ABSOLUTE EOS #: 0.3 K/UL (ref 0–0.4)
ABSOLUTE IMMATURE GRANULOCYTE: ABNORMAL K/UL (ref 0–0.3)
ABSOLUTE LYMPH #: 1.3 K/UL (ref 1–4.8)
ABSOLUTE MONO #: 0.5 K/UL (ref 0.1–1.3)
ANION GAP SERPL CALCULATED.3IONS-SCNC: 12 MMOL/L (ref 9–17)
BASOPHILS # BLD: 1 % (ref 0–2)
BASOPHILS ABSOLUTE: 0.1 K/UL (ref 0–0.2)
BUN BLDV-MCNC: 25 MG/DL (ref 6–20)
BUN/CREAT BLD: ABNORMAL (ref 9–20)
CALCIUM SERPL-MCNC: 9.4 MG/DL (ref 8.6–10.4)
CHLORIDE BLD-SCNC: 99 MMOL/L (ref 98–107)
CO2: 26 MMOL/L (ref 20–31)
CREAT SERPL-MCNC: 1.38 MG/DL (ref 0.7–1.2)
DIFFERENTIAL TYPE: ABNORMAL
EOSINOPHILS RELATIVE PERCENT: 3 % (ref 0–4)
GFR AFRICAN AMERICAN: >60 ML/MIN
GFR NON-AFRICAN AMERICAN: 54 ML/MIN
GFR SERPL CREATININE-BSD FRML MDRD: ABNORMAL ML/MIN/{1.73_M2}
GFR SERPL CREATININE-BSD FRML MDRD: ABNORMAL ML/MIN/{1.73_M2}
GLUCOSE BLD-MCNC: 228 MG/DL (ref 70–99)
HCT VFR BLD CALC: 43 % (ref 41–53)
HEMOGLOBIN: 14.2 G/DL (ref 13.5–17.5)
IMMATURE GRANULOCYTES: ABNORMAL %
LYMPHOCYTES # BLD: 14 % (ref 24–44)
MCH RBC QN AUTO: 27.3 PG (ref 26–34)
MCHC RBC AUTO-ENTMCNC: 32.9 G/DL (ref 31–37)
MCV RBC AUTO: 83 FL (ref 80–100)
MONOCYTES # BLD: 6 % (ref 1–7)
NRBC AUTOMATED: ABNORMAL PER 100 WBC
PDW BLD-RTO: 16 % (ref 11.5–14.9)
PLATELET # BLD: 189 K/UL (ref 150–450)
PLATELET ESTIMATE: ABNORMAL
PMV BLD AUTO: 9.3 FL (ref 6–12)
POTASSIUM SERPL-SCNC: 4.7 MMOL/L (ref 3.7–5.3)
RBC # BLD: 5.18 M/UL (ref 4.5–5.9)
RBC # BLD: ABNORMAL 10*6/UL
SEG NEUTROPHILS: 76 % (ref 36–66)
SEGMENTED NEUTROPHILS ABSOLUTE COUNT: 7.6 K/UL (ref 1.3–9.1)
SODIUM BLD-SCNC: 137 MMOL/L (ref 135–144)
WBC # BLD: 9.8 K/UL (ref 3.5–11)
WBC # BLD: ABNORMAL 10*3/UL

## 2018-07-06 PROCEDURE — 36415 COLL VENOUS BLD VENIPUNCTURE: CPT

## 2018-07-06 PROCEDURE — 85025 COMPLETE CBC W/AUTO DIFF WBC: CPT

## 2018-07-06 PROCEDURE — 80048 BASIC METABOLIC PNL TOTAL CA: CPT

## 2018-07-06 ASSESSMENT — PAIN DESCRIPTION - ONSET: ONSET: ON-GOING

## 2018-07-06 ASSESSMENT — PAIN DESCRIPTION - FREQUENCY: FREQUENCY: CONTINUOUS

## 2018-07-06 ASSESSMENT — PAIN DESCRIPTION - DESCRIPTORS: DESCRIPTORS: CONSTANT;STABBING;ACHING

## 2018-07-06 ASSESSMENT — PAIN SCALES - GENERAL: PAINLEVEL_OUTOF10: 8

## 2018-07-06 ASSESSMENT — PAIN DESCRIPTION - PAIN TYPE: TYPE: CHRONIC PAIN

## 2018-07-06 ASSESSMENT — PAIN DESCRIPTION - ORIENTATION: ORIENTATION: LOWER

## 2018-07-06 ASSESSMENT — PAIN DESCRIPTION - PROGRESSION: CLINICAL_PROGRESSION: NOT CHANGED

## 2018-07-06 ASSESSMENT — PAIN DESCRIPTION - LOCATION: LOCATION: BACK

## 2018-07-06 NOTE — H&P
HISTORY and [address] [id number]       NAME:  Ofelia Loving MRN: [id number]   YOB: 1965   Date: 7/6/2018   Age: 47 y.o. Gender: male       COMPLAINT AND PRESENT HISTORY:     Ofelia Loving is 47 y.o.,   male, having an EGD. Pt has GERD, Dysphagia. Pt gets heartburn, food gets stuck in the lower esophagus. The symptomds have been getting worse for the last year. Patient denies any other GI symptoms. No nausea / vomiting, No diarrhea or constipation. No abdominal pains or cramping, No hanges in the color of the stools. No fever or chills.     PAST MEDICAL HISTORY     Past Medical History:   Diagnosis Date    Acute on chronic kidney failure ([address] 7/20/2017    Adhesive capsulitis of left shoulder 3/25/2017    Anxiety 10/2/2016    Arthropathy, unspecified, other specified sites 6/13/2013    Asthma     Bilateral lower leg cellulitis 12/24/2016    Bilateral lower leg cellulitis 7/5/2017    Bilateral lower leg cellulitis 2/17/2016    Blood in stool     CAD (coronary artery disease)     Cellulitis of both lower extremities 5/25/2017    CHF (congestive heart failure), NYHA class III ([address] 8/14/2013    Chronic back pain     Chronic headaches     was referred to neuro, testing scheduled    Chronic kidney disease     COPD (chronic obstructive pulmonary disease) ([address]     COPD exacerbation ([address] 11/2/2016    Diabetic neuropathy ([address] 8/14/2013    Displacement of lumbar intervertebral disc without myelopathy 6/13/2013    Ear infection     RIGHT    Facial cellulitis 2012    Fall 3/25/2017    GERD (gastroesophageal reflux disease)     Head injury     Hearing loss in right ear     pencil pierced ear as a child    Hepatic steatosis 12/3/2015    History of general anesthesia complication     has woke up during surgery under anesthesia    History of rib fracture 12/3/2015    Chronic     Hyperlipidemia     Hypertension     Insomnia     Intolerance of continuous positive airway pressure (CPAP) ventilation 7/20/2017    Mastoiditis of left side     Mixed conductive and sensorineural hearing loss of both ears 1/10/2017    Per ENT    Mixed type COPD (chronic obstructive pulmonary disease) ([address]     Morbid obesity with BMI of 45.0-49.9, adult ([address] 6/16/2015    Neuropathy ([address]     Obesity     BARBY on CPAP     Osteoarthritis     Otitis externa of left ear     Pancreatitis chronic     Renal insufficiency     proteinuria    S/P cardiac cath 04/23/2018    Severe depression ([address] 9/25/2013    Syncope 4/28/2017    Tinnitus of both ears 1/10/2017    Per ENT    Type 2 diabetes mellitus with stage 3 chronic kidney disease, with long-term current use of insulin ([address] 12/26/2016    Type II or unspecified type diabetes mellitus without mention of complication, not stated as uncontrolled     uncontrolled    Wears glasses     for reading         SURGICAL HISTORY       Past Surgical History:   Procedure Laterality Date    BACK SURGERY   (x 4) 2000,.12/2011.2/2012     Dr Augustus Remy last [address]  04/23/2018    no stenting    COLONOSCOPY  11/3/2015    hemorrhoids, poor prep    CORONARY ANGIOPLASTY WITH STENT PLACEMENT  March 2013    x 1    HAND TENDON SURGERY Left     thumb tendon repair    KNEE ARTHROSCOPY Left     NERVE BLOCK  07-31-15    TENS unit    TYMPANOMASTOIDECTOMY Bilateral 09/20/2012    Dr Cuate Swain ENDOSCOPY  4/13/2013    normal         SOCIAL HISTORY       Social History     Social History    Marital status:      Spouse name: N/A    Number of children: N/A    Years of education: N/A     Occupational History    disability      unemployed     Social History Main Topics    Smoking status: Former Smoker     Packs/day: 3.00     Years: 30.00     Types: Cigarettes     Start date: 6/22/1985     Quit date: 7/10/2017    Smokeless tobacco: Former User     Types: Snuff     Quit date: Bottle 0    STOOL SOFTENER 100 MG capsule TAKE ONE CAPSULE BY MOUTH TWICE A DAY AS NEEDED FOR CONSTIPATION 180 capsule 3    vitamin D (CHOLECALCIFEROL) 1000 UNIT TABS tablet TAKE ONE TABLET BY MOUTH DAILY 90 tablet 3    clopidogrel (PLAVIX) 75 MG tablet TAKE ONE TABLET BY MOUTH DAILY 90 tablet 2    pantoprazole (PROTONIX) 40 MG tablet Take 1 tablet by mouth nightly 90 tablet 3    metoprolol tartrate (LOPRESSOR) 50 MG tablet Take 1 tablet by mouth 2 times daily 180 tablet 3    Ferrous Sulfate (IRON) 325 (65 Fe) MG TABS Take 1 tablet by mouth daily 90 tablet 3    fluticasone-salmeterol (ADVAIR HFA) 230-21 MCG/ACT inhaler Inhale 2 puffs into the lungs 2 times daily 12 g 11    tiotropium (SPIRIVA HANDIHALER) 18 MCG inhalation capsule Inhale 1 capsule into the lungs daily 60 capsule 11    mupirocin (BACTROBAN) 2 % ointment Apply topically 3 times daily on the affected area 1 Tube 0    metolazone (ZAROXOLYN) 2.5 MG tablet Take 1 tablet by mouth once a week On tuesday 12 tablet 0    lisinopril (PRINIVIL;ZESTRIL) 5 MG tablet Take 1 tablet by mouth daily . Discontinued Lisinopril  10 mg 90 tablet 3    ammonium lactate (LAC-HYDRIN) 12 % lotion Apply topically daily on the legs. 567 g 2    PROAIR  (90 Base) MCG/ACT inhaler INHALE TWO PUFFS BY MOUTH EVERY 6 HOURS AS NEEDED FOR WHEEZING OR FOR SHORTNESS OF BREATH 18 g 5    miconazole (MICOTIN) 2 % powder Apply topically 2 times daily axillary for rash 85 g 3    bumetanide (BUMEX) 1 MG tablet Take 3 tablets by mouth 2 times daily (Patient taking differently: Take 3 mg by mouth 2 times daily 3 mg am 2 mg pm) 180 tablet 1    fluticasone (CUTIVATE) 0.05 % cream       lidocaine (LMX) 4 % cream Apply topically daily as needed. 76.5 g 5    fluticasone (FLONASE) 50 MCG/ACT nasal spray 2 sprays by Nasal route daily 1 Bottle 3    Melatonin 10 MG TABS Take 10 mg by mouth nightly as needed (insomnia) 90 tablet 1    aspirin 81 MG tablet Take 81 mg by mouth daily.

## 2018-07-07 ENCOUNTER — ANESTHESIA EVENT (OUTPATIENT)
Dept: OPERATING ROOM | Age: 53
End: 2018-07-07
Payer: COMMERCIAL

## 2018-07-07 RX ORDER — SODIUM CHLORIDE 0.9 % (FLUSH) 0.9 %
10 SYRINGE (ML) INJECTION PRN
Status: CANCELLED | OUTPATIENT
Start: 2018-07-07

## 2018-07-07 RX ORDER — SODIUM CHLORIDE 9 MG/ML
INJECTION, SOLUTION INTRAVENOUS CONTINUOUS
Status: CANCELLED | OUTPATIENT
Start: 2018-07-07

## 2018-07-07 RX ORDER — LIDOCAINE HYDROCHLORIDE 10 MG/ML
1 INJECTION, SOLUTION EPIDURAL; INFILTRATION; INTRACAUDAL; PERINEURAL
Status: CANCELLED | OUTPATIENT
Start: 2018-07-07 | End: 2018-07-07

## 2018-07-07 RX ORDER — SODIUM CHLORIDE 0.9 % (FLUSH) 0.9 %
10 SYRINGE (ML) INJECTION EVERY 12 HOURS SCHEDULED
Status: CANCELLED | OUTPATIENT
Start: 2018-07-07

## 2018-07-10 ENCOUNTER — TELEPHONE (OUTPATIENT)
Dept: GASTROENTEROLOGY | Age: 53
End: 2018-07-10

## 2018-07-12 ENCOUNTER — HOSPITAL ENCOUNTER (OUTPATIENT)
Dept: OTHER | Age: 53
Discharge: HOME | End: 2018-07-12
Payer: COMMERCIAL

## 2018-07-12 VITALS
OXYGEN SATURATION: 98 % | BODY MASS INDEX: 53.11 KG/M2 | DIASTOLIC BLOOD PRESSURE: 80 MMHG | SYSTOLIC BLOOD PRESSURE: 138 MMHG | RESPIRATION RATE: 22 BRPM | WEIGHT: 315 LBS | HEART RATE: 82 BPM

## 2018-07-12 PROCEDURE — 99211 OFF/OP EST MAY X REQ PHY/QHP: CPT

## 2018-07-12 NOTE — PROGRESS NOTES
Pt tried to get into Dr Shelley Boast but has to wait 5 mos to see him he states. Wheezes and diminished through out lung fields noted. Cellulitis in legs appears to be back. Pt was hospitalized in past with IV antibiotics at length. Pt scheduled for scope 7-13, needs to know if he is suppose to stop his Plavix for the procedure.

## 2018-07-18 ENCOUNTER — ANESTHESIA (OUTPATIENT)
Dept: OPERATING ROOM | Age: 53
End: 2018-07-18
Payer: COMMERCIAL

## 2018-07-18 ENCOUNTER — HOSPITAL ENCOUNTER (OUTPATIENT)
Age: 53
Setting detail: OUTPATIENT SURGERY
Discharge: HOME | End: 2018-07-18
Attending: INTERNAL MEDICINE | Admitting: INTERNAL MEDICINE
Payer: COMMERCIAL

## 2018-07-18 VITALS
SYSTOLIC BLOOD PRESSURE: 149 MMHG | WEIGHT: 315 LBS | OXYGEN SATURATION: 96 % | TEMPERATURE: 97.5 F | DIASTOLIC BLOOD PRESSURE: 52 MMHG | HEART RATE: 80 BPM | HEIGHT: 72 IN | RESPIRATION RATE: 17 BRPM | BODY MASS INDEX: 42.66 KG/M2

## 2018-07-18 VITALS — DIASTOLIC BLOOD PRESSURE: 50 MMHG | SYSTOLIC BLOOD PRESSURE: 130 MMHG | OXYGEN SATURATION: 94 %

## 2018-07-18 LAB
GLUCOSE BLD-MCNC: 138 MG/DL (ref 75–110)
GLUCOSE BLD-MCNC: 142 MG/DL (ref 75–110)

## 2018-07-18 PROCEDURE — 2709999900 HC NON-CHARGEABLE SUPPLY: Performed by: INTERNAL MEDICINE

## 2018-07-18 PROCEDURE — 3700000001 HC ADD 15 MINUTES (ANESTHESIA): Performed by: INTERNAL MEDICINE

## 2018-07-18 PROCEDURE — 2580000003 HC RX 258: Performed by: ANESTHESIOLOGY

## 2018-07-18 PROCEDURE — 6370000000 HC RX 637 (ALT 250 FOR IP): Performed by: INTERNAL MEDICINE

## 2018-07-18 PROCEDURE — 82947 ASSAY GLUCOSE BLOOD QUANT: CPT

## 2018-07-18 PROCEDURE — 3700000000 HC ANESTHESIA ATTENDED CARE: Performed by: INTERNAL MEDICINE

## 2018-07-18 PROCEDURE — 3609017100 HC EGD: Performed by: INTERNAL MEDICINE

## 2018-07-18 PROCEDURE — 6360000002 HC RX W HCPCS: Performed by: ANESTHESIOLOGY

## 2018-07-18 PROCEDURE — 7100000001 HC PACU RECOVERY - ADDTL 15 MIN: Performed by: INTERNAL MEDICINE

## 2018-07-18 PROCEDURE — 7100000000 HC PACU RECOVERY - FIRST 15 MIN: Performed by: INTERNAL MEDICINE

## 2018-07-18 RX ORDER — ONDANSETRON 2 MG/ML
4 INJECTION INTRAMUSCULAR; INTRAVENOUS
Status: DISCONTINUED | OUTPATIENT
Start: 2018-07-18 | End: 2018-07-18 | Stop reason: HOSPADM

## 2018-07-18 RX ORDER — MIDAZOLAM HYDROCHLORIDE 1 MG/ML
INJECTION INTRAMUSCULAR; INTRAVENOUS PRN
Status: DISCONTINUED | OUTPATIENT
Start: 2018-07-18 | End: 2018-07-18 | Stop reason: SDUPTHER

## 2018-07-18 RX ORDER — SODIUM CHLORIDE 0.9 % (FLUSH) 0.9 %
10 SYRINGE (ML) INJECTION EVERY 12 HOURS SCHEDULED
Status: DISCONTINUED | OUTPATIENT
Start: 2018-07-18 | End: 2018-07-18 | Stop reason: HOSPADM

## 2018-07-18 RX ORDER — DIPHENHYDRAMINE HYDROCHLORIDE 50 MG/ML
12.5 INJECTION INTRAMUSCULAR; INTRAVENOUS
Status: DISCONTINUED | OUTPATIENT
Start: 2018-07-18 | End: 2018-07-18 | Stop reason: HOSPADM

## 2018-07-18 RX ORDER — SODIUM CHLORIDE 0.9 % (FLUSH) 0.9 %
10 SYRINGE (ML) INJECTION PRN
Status: DISCONTINUED | OUTPATIENT
Start: 2018-07-18 | End: 2018-07-18 | Stop reason: HOSPADM

## 2018-07-18 RX ORDER — MORPHINE SULFATE 2 MG/ML
2 INJECTION, SOLUTION INTRAMUSCULAR; INTRAVENOUS EVERY 5 MIN PRN
Status: DISCONTINUED | OUTPATIENT
Start: 2018-07-18 | End: 2018-07-18 | Stop reason: HOSPADM

## 2018-07-18 RX ORDER — MEPERIDINE HYDROCHLORIDE 50 MG/ML
12.5 INJECTION INTRAMUSCULAR; INTRAVENOUS; SUBCUTANEOUS EVERY 5 MIN PRN
Status: DISCONTINUED | OUTPATIENT
Start: 2018-07-18 | End: 2018-07-18 | Stop reason: HOSPADM

## 2018-07-18 RX ORDER — SODIUM CHLORIDE 9 MG/ML
INJECTION, SOLUTION INTRAVENOUS CONTINUOUS PRN
Status: DISCONTINUED | OUTPATIENT
Start: 2018-07-18 | End: 2018-07-18 | Stop reason: SDUPTHER

## 2018-07-18 RX ORDER — LIDOCAINE HYDROCHLORIDE 10 MG/ML
1 INJECTION, SOLUTION EPIDURAL; INFILTRATION; INTRACAUDAL; PERINEURAL
Status: DISCONTINUED | OUTPATIENT
Start: 2018-07-18 | End: 2018-07-18 | Stop reason: HOSPADM

## 2018-07-18 RX ORDER — SODIUM CHLORIDE 9 MG/ML
INJECTION, SOLUTION INTRAVENOUS CONTINUOUS
Status: DISCONTINUED | OUTPATIENT
Start: 2018-07-18 | End: 2018-07-18 | Stop reason: HOSPADM

## 2018-07-18 RX ORDER — PROPOFOL 10 MG/ML
INJECTION, EMULSION INTRAVENOUS PRN
Status: DISCONTINUED | OUTPATIENT
Start: 2018-07-18 | End: 2018-07-18 | Stop reason: SDUPTHER

## 2018-07-18 RX ORDER — LABETALOL HYDROCHLORIDE 5 MG/ML
5 INJECTION, SOLUTION INTRAVENOUS EVERY 10 MIN PRN
Status: DISCONTINUED | OUTPATIENT
Start: 2018-07-18 | End: 2018-07-18 | Stop reason: HOSPADM

## 2018-07-18 RX ORDER — PROPOFOL 10 MG/ML
INJECTION, EMULSION INTRAVENOUS CONTINUOUS PRN
Status: DISCONTINUED | OUTPATIENT
Start: 2018-07-18 | End: 2018-07-18 | Stop reason: SDUPTHER

## 2018-07-18 RX ADMIN — SODIUM CHLORIDE: 9 INJECTION, SOLUTION INTRAVENOUS at 08:59

## 2018-07-18 RX ADMIN — PROPOFOL 50 MG: 10 INJECTION, EMULSION INTRAVENOUS at 09:07

## 2018-07-18 RX ADMIN — PROPOFOL 100 MCG/KG/MIN: 10 INJECTION, EMULSION INTRAVENOUS at 09:07

## 2018-07-18 RX ADMIN — SODIUM CHLORIDE: 9 INJECTION, SOLUTION INTRAVENOUS at 09:07

## 2018-07-18 RX ADMIN — MIDAZOLAM 2 MG: 1 INJECTION INTRAMUSCULAR; INTRAVENOUS at 09:07

## 2018-07-18 ASSESSMENT — PULMONARY FUNCTION TESTS
PIF_VALUE: 0
PIF_VALUE: 0
PIF_VALUE: -11
PIF_VALUE: 0

## 2018-07-18 ASSESSMENT — PAIN SCALES - GENERAL
PAINLEVEL_OUTOF10: 0
PAINLEVEL_OUTOF10: 0

## 2018-07-18 ASSESSMENT — ENCOUNTER SYMPTOMS
STRIDOR: 0
SHORTNESS OF BREATH: 0

## 2018-07-18 ASSESSMENT — COPD QUESTIONNAIRES: CAT_SEVERITY: NO INTERVAL CHANGE

## 2018-07-18 ASSESSMENT — LIFESTYLE VARIABLES: SMOKING_STATUS: 0

## 2018-07-18 ASSESSMENT — PAIN - FUNCTIONAL ASSESSMENT: PAIN_FUNCTIONAL_ASSESSMENT: 0-10

## 2018-07-18 NOTE — OP NOTE
ESOPHAGOGASTRODUODENOSCOPY   ( EGD )  DATE OF PROCEDURE: 7/18/2018     SURGEON: Madalyn Gamez MD    ASSISTANT: None    PREOPERATIVE DIAGNOSIS: Substernal discomfort, chronic GERD. Negative cardiac workup. Patient is referred to evaluate esophageal pathology that can account for his substernal discomfort. POSTOPERATIVE DIAGNOSIS: No lesions seen up to 2nd part of the duodenum. No endoscopic evidence of esophagitis. OPERATION: Upper GI endoscopy       ANESTHESIA: MAC.    ESTIMATED BLOOD LOSS: None    COMPLICATIONS: None. SPECIMENS:  Was Not Obtained    HISTORY: The patient is a 47y.o. year old male with history of above preop diagnosis. I recommended esophagogastroduodenoscopy with possible biopsy and I explained the risk, benefits, expected outcome, and alternatives to the procedure. Risks included but are not limited to bleeding, infection, respiratory distress, hypotension, and perforation of the esophagus, stomach, or duodenum. Patient understands and is in agreement. PROCEDURE: The patient was given IV conscious sedation. The patient's SPO2 remained above 90% throughout the procedure. Cetacaine spray given. Patient placed in left lateral position. Olympus  videogastroscope was inserted orally under vision into the esophagus without difficulty and advanced into the stomach then through the pylorus up to the second part of duodenum. Findings:    Retropharyngeal area was grossly normal appearing    Esophagus: normal    Stomach:    Fundus and Cardia Examined in Retroflexed View: normal    Body: normal    Antrum: normal    Duodenum:     Descending: normal    Bulb: normal      During this exam no lesions seen that can account for his substernal discomfort. While withdrawing the scope the above findings were verified and the scope was removed. The patient has tolerated the procedure and conscious sedation without unusual events.          Recommendations/Plan: 1.   2. F/U In Office as instructed  3.  Discussed with the family                   Electronically signed by Ro Farias MD  on 7/18/2018 at 10:02 AM

## 2018-07-18 NOTE — H&P
HISTORY and [address] [facility] [address]       NAME:  Irineo Barrera. MRN: [id number]   YOB: 1965   Date: 7/18/2018   Age: 47 y.o. Gender: male     H&P Update Note    H&P from 07/06/2018 reviewed and updated. Patient examined. INTERVAL HISTORY:     Patient is feeling well today, denies any fever/chills, chest pain. Shortness of breath, no change from baseline. No interval changes. GENERAL PHYSICAL EXAM:     Vitals: BP (!) 145/83   Pulse 86   Temp 97.5 °F (36.4 °C) (Oral)   Resp 20   Ht 6' (1.829 m)   Wt (!) 381 lb (172.8 kg)   SpO2 95%   BMI 51.67 kg/m²  Body mass index is 51.67 kg/m². GENERAL APPEARANCE:  Patient is alert and oriented. Does not appear to be distress or pain at this time.           SKIN:  Normal temperature, turgor and texture.       HEART:  Slightly hypertensive. Regular rate and rhythm.     LUNGS:  Clear to auscultation bilaterally, slightly diminished throughout.       ABDOMEN:  Soft on palpation. No localized tenderness, guarding or rigidity. EXTREMITIES:  Symmetrical, no changes from below. No tenderness to palpation. No interval changes. I concur with the findings. Merlin Quintanilla PA-C on 7/18/2018 at 8:43 AM          Jeyson Xiao PA-C Physician Assistant [facility]  H&P Date of Service: 7/6/2018  1:54 PM   Related encounter: Pre-Admission Testing Visit from 7/6/2018 in [facility]         NAME:  Irineo Barrera. MRN: [id number]   YOB: 1965   Date: 7/6/2018   Age: 47 y.o. Gender: male      COMPLAINT AND PRESENT HISTORY:      Miguel Trimble. is 47 y.o.,  male, having an EGD. Pt has GERD, Dysphagia. Pt gets heartburn, food gets stuck in the lower esophagus. The symptoms have been getting worse for the last year. Patient denies any other GI symptoms. No nausea / vomiting, No diarrhea or constipation. No abdominal pains or cramping, No changes in the color of the stools.    No fever or chills.     PAST MEDICAL HISTORY      Past Medical History        Past Medical History:   Diagnosis Date    Acute on chronic kidney failure ([address] 7/20/2017    Adhesive capsulitis of left shoulder 3/25/2017    Anxiety 10/2/2016    Arthropathy, unspecified, other specified sites 6/13/2013    Asthma      Bilateral lower leg cellulitis 12/24/2016    Bilateral lower leg cellulitis 7/5/2017    Bilateral lower leg cellulitis 2/17/2016    Blood in stool      CAD (coronary artery disease)      Cellulitis of both lower extremities 5/25/2017    CHF (congestive heart failure), NYHA class III ([address] 8/14/2013    Chronic back pain      Chronic headaches       was referred to neuro, testing scheduled    Chronic kidney disease      COPD (chronic obstructive pulmonary disease) ([address]      COPD exacerbation ([address] 11/2/2016    Diabetic neuropathy ([facility] 8/14/2013    Displacement of lumbar intervertebral disc without myelopathy 6/13/2013    Ear infection       RIGHT    Facial cellulitis 2012    Fall 3/25/2017    GERD (gastroesophageal reflux disease)      Head injury      Hearing loss in right ear       pencil pierced ear as a child    Hepatic steatosis 12/3/2015    History of general anesthesia complication       has woke up during surgery under anesthesia    History of rib fracture 12/3/2015     Chronic     Hyperlipidemia      Hypertension      Insomnia      Intolerance of continuous positive airway pressure (CPAP) ventilation 7/20/2017    Mastoiditis of left side      Mixed conductive and sensorineural hearing loss of both ears 1/10/2017     Per ENT    Mixed type COPD (chronic obstructive pulmonary disease) ([address]      Morbid obesity with BMI of 45.0-49.9, adult ([address] 6/16/2015    Neuropathy (HCC)      Obesity      BARBY on CPAP      Osteoarthritis      Otitis externa of left ear      Pancreatitis chronic      OF SYSTEMS             Allergies   Allergen Reactions    Lorazepam         Falls       Nsaids         CHF!  Levofloxacin Nausea And Vomiting       duplicate     Levofloxacin Nausea And Vomiting             Current Outpatient Prescriptions on File Prior to Encounter   Medication Sig Dispense Refill    oxyCODONE HCl (OXY-IR) 10 MG immediate release tablet Take 1 tablet by mouth every 8 hours as needed for Pain for up to 30 days. . 90 tablet 0    pravastatin (PRAVACHOL) 40 MG tablet Take 1 tablet by mouth every evening STOP ATORVASTATIN 90 tablet 3    tiZANidine (ZANAFLEX) 4 MG tablet TAKE ONE TABLET BY MOUTH EVERY 8 HOURS AS NEEDED FOR BACK PAIN 90 tablet 5    venlafaxine (EFFEXOR) 75 MG tablet Take 1 tablet by mouth 2 times daily 60 tablet 5    gabapentin (NEURONTIN) 300 MG capsule Take 1 capsule by mouth 2 times daily for 30 days. . 60 capsule 0    albuterol (PROVENTIL) (2.5 MG/3ML) 0.083% nebulizer solution Take 3 mLs by nebulization every 6 hours as needed for Wheezing or Shortness of Breath 100 each 3    nitroGLYCERIN (NITROSTAT) 0.4 MG SL tablet Place 1 tablet under the tongue every 5 minutes as needed for Chest pain 25 tablet 3    insulin regular human (HUMULIN R) 500 UNIT/ML concentrated injection vial 30+35+20 per Dr. Juliana Grewal (Patient taking differently: Inject 45 Units into the skin daily (with breakfast) 30+35+20 per Dr. Juliana Grewal) 5 vial 0    JANUVIA 25 MG tablet TAKE ONE TABLET BY MOUTH DAILY 90 tablet 3    spironolactone (ALDACTONE) 50 MG tablet Take 1 tablet by mouth daily 90 tablet 3    ciprofloxacin-dexamethasone (CIPRODEX) 0.3-0.1 % otic suspension Place 4 drops into both ears 2 times daily 1 Bottle 0    STOOL SOFTENER 100 MG capsule TAKE ONE CAPSULE BY MOUTH TWICE A DAY AS NEEDED FOR CONSTIPATION 180 capsule 3    vitamin D (CHOLECALCIFEROL) 1000 UNIT TABS tablet TAKE ONE TABLET BY MOUTH DAILY 90 tablet 3    clopidogrel (PLAVIX) 75 MG tablet TAKE ONE TABLET BY MOUTH DAILY 90 tablet 2    pantoprazole (PROTONIX) 40 MG tablet Take 1 tablet by mouth nightly 90 tablet 3    metoprolol tartrate (LOPRESSOR) 50 MG tablet Take 1 tablet by mouth 2 times daily 180 tablet 3    Ferrous Sulfate (IRON) 325 (65 Fe) MG TABS Take 1 tablet by mouth daily 90 tablet 3    fluticasone-salmeterol (ADVAIR HFA) 230-21 MCG/ACT inhaler Inhale 2 puffs into the lungs 2 times daily 12 g 11    tiotropium (SPIRIVA HANDIHALER) 18 MCG inhalation capsule Inhale 1 capsule into the lungs daily 60 capsule 11    mupirocin (BACTROBAN) 2 % ointment Apply topically 3 times daily on the affected area 1 Tube 0    metolazone (ZAROXOLYN) 2.5 MG tablet Take 1 tablet by mouth once a week On tuesday 12 tablet 0    lisinopril (PRINIVIL;ZESTRIL) 5 MG tablet Take 1 tablet by mouth daily . Discontinued Lisinopril  10 mg 90 tablet 3    ammonium lactate (LAC-HYDRIN) 12 % lotion Apply topically daily on the legs. 567 g 2    PROAIR  (90 Base) MCG/ACT inhaler INHALE TWO PUFFS BY MOUTH EVERY 6 HOURS AS NEEDED FOR WHEEZING OR FOR SHORTNESS OF BREATH 18 g 5    miconazole (MICOTIN) 2 % powder Apply topically 2 times daily axillary for rash 85 g 3    bumetanide (BUMEX) 1 MG tablet Take 3 tablets by mouth 2 times daily (Patient taking differently: Take 3 mg by mouth 2 times daily 3 mg am 2 mg pm) 180 tablet 1    fluticasone (CUTIVATE) 0.05 % cream          lidocaine (LMX) 4 % cream Apply topically daily as needed.  76.5 g 5    fluticasone (FLONASE) 50 MCG/ACT nasal spray 2 sprays by Nasal route daily 1 Bottle 3    Melatonin 10 MG TABS Take 10 mg by mouth nightly as needed (insomnia) 90 tablet 1    aspirin 81 MG tablet Take 81 mg by mouth daily.        ONE TOUCH ULTRASOFT LANCETS MISC USE ONE LANCET TO TEST THREE TIMES A  each 10    blood glucose test strips (ONE TOUCH ULTRA TEST) strip USE ONE STRIP TO TEST 3 TIMES A  strip 3    nicotine (NICODERM CQ) 21 MG/24HR Place 1 patch onto the skin every 24 hours Step 1 30 patch 3    nicotine (NICODERM CQ) 14 MG/24HR Place 1 patch onto the skin every 24 hours **Step 2** 30 patch 0    nicotine (NICODERM CQ) 7 MG/24HR Place 1 patch onto the skin every 24 hours **Step 3** 30 patch 1    COMFORT ASSIST INSULIN SYRINGE 31G X 5/16\" 1 ML MISC            No current facility-administered medications on file prior to encounter.       Negative except for what is mentioned in the HPI. GENERAL PHYSICAL EXAM      Vitals: BP (!) 177/62   Pulse 78   Temp 97.9 °F (36.6 °C) (Oral)   Resp 16   Ht 6' (1.829 m)   Wt (!) 381 lb (172.8 kg)   SpO2 96%   BMI 51.67 kg/m²  Body mass index is 51.67 kg/m².      GENERAL APPEARANCE:   José Miguel Vinson is 47 y.o.,  male, severly obese, nourished, conscious, alert. Does not appear to be distress or pain at this time. SKIN:  Warm, dry, no cyanosis or jaundice. Stasis dermatitis with thick purple skin on both legs, non pitting edema. HEAD:  Normocephalic, atraumatic, no swelling or tenderness. EYES:  Pupils equal, reactive to light. EARS:  No discharge, no marked hearing loss. NOSE:  No rhinorrhea, epistaxis or septal deformity. THROAT:  Not congested. No ulceration bleeding or discharge. NECK:  No stiffness, trachea central.  No palpable masses or L.N.                 CHEST:  Symmetrical and equal on expansion. HEART:  RRR S1 > S2. No audible murmurs or gallops. LUNGS:  Equal on expansion, normal breath sounds. No adventitious sounds. ABDOMEN:  Obese. Soft on palpation. No localized tenderness. No guarding or rigidity. No palpable organomegaly. LYMPHATICS:  No palpable cervical lymphadenopathy.      LOCOMOTOR, BACK AND SPINE:  No tenderness or deformities. EXTREMITIES:  Symmetrical, Stasis dermatitis with thick purple skin on both legs, non pitting edema.  Nevaehs sign 01/14/2015    Insomnia 02/06/2014    Peripheral neuropathy (HCC) 08/28/2013    Chronic diastolic congestive heart failure ([facility] 08/14/2013    Obesity, morbid (more than 100 lbs over ideal weight or BMI > 40) ([facility] 07/15/2013    Displacement of lumbar intervertebral disc without myelopathy 06/13/2013    CAD (coronary artery disease) s/p 1 stent      Mixed type COPD (chronic obstructive pulmonary disease) ([facility]      Type 2 diabetes mellitus with diabetic neuropathy, with long-term current use of insulin ([facility])      Hypertriglyceridemia 04/10/2013    DDD (degenerative disc disease) 08/20/2012            STEVEN MORAN PA-C on 7/6/2018 at 3:37 PM

## 2018-07-18 NOTE — ANESTHESIA PRE PROCEDURE
under the tongue every 5 minutes as needed for Chest pain 4/11/18   Trisha Taylor MD   insulin regular human (HUMULIN R) 500 UNIT/ML concentrated injection vial 30+35+20 per Dr. Mukund Umana  Patient taking differently: Inject 45 Units into the skin daily (with breakfast) 30+35+20 per Dr. Mukund Umana 4/11/18   Trisha Taylor MD   JANUVIA 25 MG tablet TAKE ONE TABLET BY MOUTH DAILY 3/26/18   Trisha Taylor MD   spironolactone (ALDACTONE) 50 MG tablet Take 1 tablet by mouth daily 3/12/18   Trisha Taylor MD   ciprofloxacin-dexamethasone (CIPRODEX) 0.3-0.1 % otic suspension Place 4 drops into both ears 2 times daily 3/8/18   Paige Bryant MD   STOOL SOFTENER 100 MG capsule TAKE ONE CAPSULE BY MOUTH TWICE A DAY AS NEEDED FOR CONSTIPATION 2/26/18   Trisha Taylor MD   vitamin D (CHOLECALCIFEROL) 1000 UNIT TABS tablet TAKE ONE TABLET BY MOUTH DAILY 2/12/18   Trisha Taylor MD   clopidogrel (PLAVIX) 75 MG tablet TAKE ONE TABLET BY MOUTH DAILY 1/29/18   Trisha Taylor MD   pantoprazole (PROTONIX) 40 MG tablet Take 1 tablet by mouth nightly 1/22/18   Trisha Taylor MD   metoprolol tartrate (LOPRESSOR) 50 MG tablet Take 1 tablet by mouth 2 times daily 11/21/17   Trisha Taylor MD   Ferrous Sulfate (IRON) 325 (65 Fe) MG TABS Take 1 tablet by mouth daily 11/17/17   Trisha Taylor MD   fluticasone-salmeterol (ADVAIR HFA) 230-21 MCG/ACT inhaler Inhale 2 puffs into the lungs 2 times daily 11/17/17   Trisha Taylor MD   tiotropium ([place]) 18 MCG inhalation capsule Inhale 1 capsule into the lungs daily 11/17/17   Trisha Taylor MD   mupirocin (BACTROBAN) 2 % ointment Apply topically 3 times daily on the affected area 11/15/17   Trisha Taylor MD   metolazone (ZAROXOLYN) 2.5 MG tablet Take 1 tablet by mouth once a week On tuesday 11/1/17   [facility], MD   lisinopril (PRINIVIL;ZESTRIL) 5 MG tablet Take 1 tablet by mouth daily . Discontinued Lisinopril 10 mg 11/1/17   Vel Trinidad MD   ammonium lactate (LAC-HYDRIN) 12 % lotion Apply topically daily on the legs. 9/8/17   Paige Bryant MD   PROAIR  (90 Base) MCG/ACT inhaler INHALE TWO PUFFS BY MOUTH EVERY 6 HOURS AS NEEDED FOR WHEEZING OR FOR SHORTNESS OF BREATH 8/21/17   Vel Trinidad MD   miconazole (MICOTIN) 2 % powder Apply topically 2 times daily axillary for rash 7/21/17   Vel Trinidad MD   bumetanide (BUMEX) 1 MG tablet Take 3 tablets by mouth 2 times daily  Patient taking differently: Take 3 mg by mouth 2 times daily 3 mg am 2 mg pm 7/7/17   Ruby Lazaro MD   fluticasone (CUTIVATE) 0.05 % cream  4/19/17   Historical Provider, MD   lidocaine (LMX) 4 % cream Apply topically daily as needed. 3/23/17   Vel Trinidad MD   fluticasone (FLONASE) 50 MCG/ACT nasal spray 2 sprays by Nasal route daily 1/16/17   Vel Trinidad MD   Melatonin 10 MG TABS Take 10 mg by mouth nightly as needed (insomnia) 12/23/16   Vel Trinidad MD   COMFORT ASSIST INSULIN SYRINGE 31G X 5/16\" 1 ML MISC  7/13/16   Historical Provider, MD   aspirin 81 MG tablet Take 81 mg by mouth daily. Historical Provider, MD       Current medications:    Current Facility-Administered Medications   Medication Dose Route Frequency Provider Last Rate Last Dose    0.9 % sodium chloride infusion   Intravenous Continuous Zafar Renae Saldana MD        lidocaine PF 1 % injection 1 mL  1 mL Intradermal Once PRN Josep Munoz MD        sodium chloride flush 0.9 % injection 10 mL  10 mL Intravenous 2 times per day Josep Munoz MD        sodium chloride flush 0.9 % injection 10 mL  10 mL Intravenous PRN Josep Munoz MD           Allergies: Allergies   Allergen Reactions    Lorazepam      Falls      Nsaids      CHF!     Levofloxacin Nausea And Vomiting     duplicate     Levofloxacin Nausea And Vomiting       Problem List:    Patient Active Problem List   Diagnosis Code    DDD edema R60.0    Tinea pedis of both feet B35.3    Onychomycosis B35.1    Dry skin dermatitis legs L85.3    Celiac artery stenosis (HCC) I77.4       Past Medical History:        Diagnosis Date    Acute on chronic kidney failure (HCC) 7/20/2017    Adhesive capsulitis of left shoulder 3/25/2017    Anxiety 10/2/2016    Arthropathy, unspecified, other specified sites 6/13/2013    Asthma     Bilateral lower leg cellulitis 12/24/2016    Bilateral lower leg cellulitis 7/5/2017    Bilateral lower leg cellulitis 2/17/2016    Blood in stool     CAD (coronary artery disease)     Cellulitis of both lower extremities 5/25/2017    CHF (congestive heart failure), NYHA class III ([address] 8/14/2013    Chronic back pain     Chronic headaches     was referred to neuro, testing scheduled    Chronic kidney disease     COPD (chronic obstructive pulmonary disease) ([address]     COPD exacerbation ([address] 11/2/2016    Diabetic neuropathy ([facility] 8/14/2013    Displacement of lumbar intervertebral disc without myelopathy 6/13/2013    Ear infection     RIGHT    Facial cellulitis 2012    Fall 3/25/2017    GERD (gastroesophageal reflux disease)     Head injury     Hearing loss in right ear     pencil pierced ear as a child    Hepatic steatosis 12/3/2015    History of general anesthesia complication     has woke up during surgery under anesthesia    History of rib fracture 12/3/2015    Chronic     Hyperlipidemia     Hypertension     Insomnia     Intolerance of continuous positive airway pressure (CPAP) ventilation 7/20/2017    Mastoiditis of left side     Mixed conductive and sensorineural hearing loss of both ears 1/10/2017    Per ENT    Mixed type COPD (chronic obstructive pulmonary disease) ([address]     Morbid obesity with BMI of 45.0-49.9, adult ([address] 6/16/2015    [facility]     Obesity     BARBY on CPAP     Osteoarthritis     Otitis externa of left ear     Pancreatitis chronic     Renal insufficiency angina, orthopnea, PND,  MCPHERSON, murmur, weak pulses,  friction rub, systolic click, carotid bruit,  JVD and peripheral edema    ECG reviewed  Rhythm: regular  Rate: normal           Beta Blocker:  Dose within 24 Hrs         Neuro/Psych:   (+) neuromuscular disease:, headaches:, psychiatric history: stable with treatmentdepression/anxiety    (-) seizures, TIA and CVA           GI/Hepatic/Renal:   (+) GERD: no interval change, liver disease:,      (-) hiatal hernia, PUD, hepatitis, no renal disease, bowel prep and no morbid obesity       Endo/Other:    (+) DiabetesType II DM, , no malignancy/cancer. (-) hypothyroidism, hyperthyroidism, blood dyscrasia, arthritis, no electrolyte abnormalities, no malignancy/cancer               Abdominal:           Vascular:                                      Anesthesia Plan      MAC and general     ASA 4       Induction: intravenous. MIPS: Postoperative opioids intended and Prophylactic antiemetics administered. Anesthetic plan and risks discussed with patient.                       Shawnee Hoover MD   7/18/2018

## 2018-07-24 ENCOUNTER — PATIENT MESSAGE (OUTPATIENT)
Dept: FAMILY MEDICINE CLINIC | Age: 53
End: 2018-07-24

## 2018-07-24 DIAGNOSIS — G89.29 CHRONIC MIDLINE LOW BACK PAIN WITH LEFT-SIDED SCIATICA: ICD-10-CM

## 2018-07-24 DIAGNOSIS — I50.32 CHRONIC DIASTOLIC CONGESTIVE HEART FAILURE (HCC): ICD-10-CM

## 2018-07-24 DIAGNOSIS — G47.01 INSOMNIA DUE TO MEDICAL CONDITION: ICD-10-CM

## 2018-07-24 DIAGNOSIS — M54.42 CHRONIC MIDLINE LOW BACK PAIN WITH LEFT-SIDED SCIATICA: ICD-10-CM

## 2018-07-24 RX ORDER — OXYCODONE HYDROCHLORIDE 10 MG/1
10 TABLET ORAL EVERY 8 HOURS PRN
Qty: 90 TABLET | Refills: 0 | Status: SHIPPED | OUTPATIENT
Start: 2018-07-24 | End: 2018-08-23 | Stop reason: SDUPTHER

## 2018-07-24 NOTE — TELEPHONE ENCOUNTER
Controlled Substances Monitoring:     RX Monitoring 7/24/2018   Attestation The Prescription Monitoring Report for this patient was reviewed today. Documentation No signs of potential drug abuse or diversion identified. Chronic Pain -   Medication Contracts Existing medication contract.

## 2018-08-08 ENCOUNTER — PATIENT MESSAGE (OUTPATIENT)
Dept: FAMILY MEDICINE CLINIC | Age: 53
End: 2018-08-08

## 2018-08-08 DIAGNOSIS — L73.9 FOLLICULITIS: Primary | ICD-10-CM

## 2018-08-08 DIAGNOSIS — L30.4 INTERTRIGO: ICD-10-CM

## 2018-08-08 RX ORDER — CEPHALEXIN 500 MG/1
500 CAPSULE ORAL 4 TIMES DAILY
Qty: 40 CAPSULE | Refills: 0 | Status: SHIPPED | OUTPATIENT
Start: 2018-08-08 | End: 2018-09-18

## 2018-08-08 RX ORDER — NYSTATIN 100000 [USP'U]/G
POWDER TOPICAL
Qty: 56.7 G | Refills: 3 | Status: SHIPPED | OUTPATIENT
Start: 2018-08-08 | End: 2018-10-02

## 2018-08-08 NOTE — TELEPHONE ENCOUNTER
From: Jolane Mcburney. To: Dina Yanez MD  Sent: 8/8/2018 6:16 PM EDT  Subject: RE: Non-Urgent Medical Question    In my private area and I have 4 of them. About three weeks and they don't go away    ----- Message -----  From: Dina Yanez MD  Sent: 8/8/18 6:10 PM  To: Jolane Mcburney. Subject: RE: Non-Urgent Medical Question    Miguel,  Where are the boils? How many? For how long? What pharmacy? Any signs of cellulitis? If you have any questions, please let me know. Dina Yanez MD  [address] [facility] [address]  [address] [address]  Dept: [phone]  Dept Fax: [phone]      ----- Message -----   From: Jolane Mcburney. Sent: 8/8/2018 1:40 PM EDT   To: Dina Yanez MD  Subject: Non-Urgent Medical Question    I Don't need a appointment.  I need some antibiotics for these Boils that is all if you have to talk to me can you please call me at [phone] thanks

## 2018-08-23 ENCOUNTER — PATIENT MESSAGE (OUTPATIENT)
Dept: FAMILY MEDICINE CLINIC | Age: 53
End: 2018-08-23

## 2018-08-23 DIAGNOSIS — G47.01 INSOMNIA DUE TO MEDICAL CONDITION: ICD-10-CM

## 2018-08-23 DIAGNOSIS — I50.32 CHRONIC DIASTOLIC CONGESTIVE HEART FAILURE (HCC): ICD-10-CM

## 2018-08-23 DIAGNOSIS — M54.42 CHRONIC MIDLINE LOW BACK PAIN WITH LEFT-SIDED SCIATICA: ICD-10-CM

## 2018-08-23 DIAGNOSIS — G89.29 CHRONIC MIDLINE LOW BACK PAIN WITH LEFT-SIDED SCIATICA: ICD-10-CM

## 2018-08-23 RX ORDER — OXYCODONE HYDROCHLORIDE 10 MG/1
10 TABLET ORAL EVERY 8 HOURS PRN
Qty: 90 TABLET | Refills: 0 | Status: SHIPPED | OUTPATIENT
Start: 2018-08-23 | End: 2018-09-18 | Stop reason: SDUPTHER

## 2018-08-23 NOTE — TELEPHONE ENCOUNTER
From: Shiloh Norman. To: Mejia Benites MD  Sent: 8/23/2018 7:28 AM EDT  Subject: Prescription Question    Can you please Refill my pain meds for me. I have enough to get me to Saturday then I will be out . Oxycodone 10 Mg 1 Tablet every 8 Hours.  thank you and have a nice day

## 2018-09-12 ENCOUNTER — HOSPITAL ENCOUNTER (EMERGENCY)
Age: 53
Discharge: HOME | End: 2018-09-12
Attending: EMERGENCY MEDICINE
Payer: COMMERCIAL

## 2018-09-12 VITALS
OXYGEN SATURATION: 97 % | HEART RATE: 95 BPM | TEMPERATURE: 98.2 F | WEIGHT: 315 LBS | SYSTOLIC BLOOD PRESSURE: 159 MMHG | HEIGHT: 72 IN | RESPIRATION RATE: 20 BRPM | BODY MASS INDEX: 42.66 KG/M2 | DIASTOLIC BLOOD PRESSURE: 59 MMHG

## 2018-09-12 DIAGNOSIS — T14.8XXA OPEN WOUND: Primary | ICD-10-CM

## 2018-09-12 PROCEDURE — 99282 EMERGENCY DEPT VISIT SF MDM: CPT

## 2018-09-12 PROCEDURE — 90471 IMMUNIZATION ADMIN: CPT | Performed by: STUDENT IN AN ORGANIZED HEALTH CARE EDUCATION/TRAINING PROGRAM

## 2018-09-12 PROCEDURE — 90715 TDAP VACCINE 7 YRS/> IM: CPT | Performed by: STUDENT IN AN ORGANIZED HEALTH CARE EDUCATION/TRAINING PROGRAM

## 2018-09-12 PROCEDURE — 6360000002 HC RX W HCPCS: Performed by: STUDENT IN AN ORGANIZED HEALTH CARE EDUCATION/TRAINING PROGRAM

## 2018-09-12 PROCEDURE — 6370000000 HC RX 637 (ALT 250 FOR IP): Performed by: STUDENT IN AN ORGANIZED HEALTH CARE EDUCATION/TRAINING PROGRAM

## 2018-09-12 RX ORDER — DOXYCYCLINE 100 MG/1
100 TABLET ORAL 2 TIMES DAILY
Qty: 14 TABLET | Refills: 0 | Status: SHIPPED | OUTPATIENT
Start: 2018-09-12 | End: 2018-09-19

## 2018-09-12 RX ORDER — MUPIROCIN CALCIUM 20 MG/G
CREAM TOPICAL
Qty: 15 G | Refills: 0 | Status: SHIPPED | OUTPATIENT
Start: 2018-09-12 | End: 2018-09-18 | Stop reason: SDUPTHER

## 2018-09-12 RX ORDER — DOXYCYCLINE 100 MG/1
100 CAPSULE ORAL ONCE
Status: COMPLETED | OUTPATIENT
Start: 2018-09-12 | End: 2018-09-12

## 2018-09-12 RX ADMIN — DOXYCYCLINE 100 MG: 100 CAPSULE ORAL at 23:16

## 2018-09-12 RX ADMIN — TETANUS TOXOID, REDUCED DIPHTHERIA TOXOID AND ACELLULAR PERTUSSIS VACCINE, ADSORBED 0.5 ML: 5; 2.5; 8; 8; 2.5 SUSPENSION INTRAMUSCULAR at 22:39

## 2018-09-12 ASSESSMENT — PAIN SCALES - GENERAL: PAINLEVEL_OUTOF10: 8

## 2018-09-12 ASSESSMENT — ENCOUNTER SYMPTOMS
VOMITING: 0
NAUSEA: 0

## 2018-09-13 ENCOUNTER — TELEPHONE (OUTPATIENT)
Dept: FAMILY MEDICINE CLINIC | Age: 53
End: 2018-09-13

## 2018-09-13 NOTE — ED PROVIDER NOTES
[address] ED  eMERGENCY dEPARTMENT eNCOUnter   Attending Attestation     Pt Name: Cathy Simons MRN: [id number]  Kaygfnabil 1965  Date of evaluation: 9/12/18       Cathy Simons is a 47 y.o. male who presents with Wound Check      History:   Patient is here because he has a wound to his right shin that he is concerned about infection. Patient has diabetes and gets infections easily. Patient states been draining some yellow purulent discharge. Exam: Vitals:   Vitals:    09/12/18 2205   BP: (!) 159/59   Pulse: 95   Resp: 20   Temp: 98.2 °F (36.8 °C)   TempSrc: Oral   SpO2: 97%   Weight: (!) 395 lb (179.2 kg)   Height: 6' (1.829 m)     Patient has small abrasions to the front aspect of his shin. There is no surrounding erythema no warmth and patient has no fevers. I performed a history and physical examination of the patient and discussed management with the resident. I reviewed the residents note and agree with the documented findings and plan of care. Any areas of disagreement are noted on the chart. I was personally present for the key portions of any procedures. I have documented in the chart those procedures where I was not present during the key portions. I have personally reviewed all images and agree with the resident's interpretation. I have reviewed the emergency nurses triage note. I agree with the chief complaint, past medical history, past surgical history, allergies, medications, social and family history as documented unless otherwise noted below. Documentation of the HPI, Physical Exam and Medical Decision Making performed by medical students or scribes is based on my personal performance of the HPI, PE and MDM. I personally evaluated and examined the patient in conjunction with the APC and agree with the assessment, treatment plan, and disposition of the patient as recorded by the APC. Additional findings are as noted.     Laina Hale MD  Attending Emergency

## 2018-09-13 NOTE — ED PROVIDER NOTES
Hyperlipidemia; Hypertension; Insomnia; Intolerance of continuous positive airway pressure (CPAP) ventilation; Mastoiditis of left side; Mixed conductive and sensorineural hearing loss of both ears; Mixed type COPD (chronic obstructive pulmonary disease) ([facility] [address] Morbid obesity with BMI of 45.0-49.9, adult ([facility] [address] Neuropathy; Obesity; BARBY on CPAP; Osteoarthritis; Otitis externa of left ear; Pancreatitis chronic; Renal insufficiency; S/P cardiac cath; Severe depression ([facility] [address] Syncope; Tinnitus of both ears; Type 2 diabetes mellitus with stage 3 chronic kidney disease, with long-term current use of insulin ([facility] [address] Type II or unspecified type diabetes mellitus without mention of complication, not stated as uncontrolled; and Wears glasses. has a past surgical history that includes tympanomastoidectomy (Bilateral, 09/20/2012); Upper gastrointestinal endoscopy (4/13/2013); Coronary angioplasty with stent (March 2013); Hand tendon surgery (Left); Knee arthroscopy (Left); back surgery ( (x 4) 2000,.12/2011.2/2012); Nerve Block (07-31-15); Colonoscopy (11/3/2015); Cardiac catheterization (04/23/2018); and pr esophagogastroduodenoscopy transoral diagnostic (N/A, 7/18/2018).     Social History     Social History    Marital status:      Spouse name: N/A    Number of children: N/A    Years of education: N/A     Occupational History    disability      unemployed     Social History Main Topics    Smoking status: Current Every Day Smoker     Packs/day: 3.00     Years: 30.00     Types: Cigarettes     Start date: 6/22/1985     Last attempt to quit: 7/10/2017    Smokeless tobacco: Former User     Types: Snuff     Quit date: 6/22/1995      Comment: pt smoked 3 ppd x 30 years    Alcohol use No    Drug use: Yes     Types: Marijuana      Comment: 2 \"cigarettes a day, mainly at night\" 3 days ago    Sexual activity: Not Currently     Other Topics Concern    Not on file     Social History Narrative    Middle of possible Lisinopril  10 mg 11/1/17  Yes Vel Trinidad MD   ammonium lactate (LAC-HYDRIN) 12 % lotion Apply topically daily on the legs. 9/8/17  Yes Vel Trinidad MD   PROAIR  (90 Base) MCG/ACT inhaler INHALE TWO PUFFS BY MOUTH EVERY 6 HOURS AS NEEDED FOR WHEEZING OR FOR SHORTNESS OF BREATH 8/21/17  Yes Vel Trinidad MD   miconazole (MICOTIN) 2 % powder Apply topically 2 times daily axillary for rash 7/21/17  Yes Vel Trinidad MD   bumetanide (BUMEX) 1 MG tablet Take 3 tablets by mouth 2 times daily  Patient taking differently: Take 3 mg by mouth 2 times daily 3 mg am 2 mg pm 7/7/17  Yes Ruby Lazaro MD   fluticasone (CUTIVATE) 0.05 % cream  4/19/17  Yes Historical Provider, MD   lidocaine (LMX) 4 % cream Apply topically daily as needed. 3/23/17  Yes Vel Trinidad MD   Melatonin 10 MG TABS Take 10 mg by mouth nightly as needed (insomnia) 12/23/16  Yes Vel Trinidad MD   aspirin 81 MG tablet Take 81 mg by mouth daily. Yes Historical Provider, MD   cephALEXin (KEFLEX) 500 MG capsule Take 1 capsule by mouth 4 times daily 8/8/18   Vel Trinidad MD   nystatin (MYCOSTATIN) 741465 UNIT/GM powder Apply 2- 3 times daily.  8/8/18   Vel Trinidad MD   ONE TOUCH ULTRASOFT LANCETS MISC USE ONE LANCET TO TEST THREE TIMES A DAY 7/5/18   Vel Trinidad MD   nicotine (NICODERM CQ) 21 MG/24HR Place 1 patch onto the skin every 24 hours Step 1 5/30/18 5/30/19  Vel Trinidad MD   nicotine (NICODERM CQ) 14 MG/24HR Place 1 patch onto the skin every 24 hours **Step 2** 5/30/18   Vel Trinidad MD   nicotine (NICODERM CQ) 7 MG/24HR Place 1 patch onto the skin every 24 hours **Step 3** 5/30/18   Vel Trinidad MD   fluticasone (FLONASE) 50 MCG/ACT nasal spray 2 sprays by Nasal route daily 1/16/17   Vel Trinidad MD   COMFORT ASSIST INSULIN SYRINGE 31G X 5/16\" 1 ML MISC  7/13/16   Historical Provider, MD       REVIEW OF SYSTEMS    (2-9 systems for level 4, 10 or

## 2018-09-13 NOTE — TELEPHONE ENCOUNTER
Noted   Future Appointments  Date Time Provider William Givensi   9/18/2018 11:00 AM Mejia Benites MD [facility]   10/9/2018 3:00 PM Celestino Gagnon MD [facility] None   10/18/2018 8:15 AM Mejia Benites MD [facility]

## 2018-09-18 ENCOUNTER — OFFICE VISIT (OUTPATIENT)
Dept: FAMILY MEDICINE CLINIC | Age: 53
End: 2018-09-18
Payer: COMMERCIAL

## 2018-09-18 VITALS
WEIGHT: 315 LBS | TEMPERATURE: 97.6 F | HEART RATE: 76 BPM | DIASTOLIC BLOOD PRESSURE: 71 MMHG | BODY MASS INDEX: 42.66 KG/M2 | OXYGEN SATURATION: 98 % | SYSTOLIC BLOOD PRESSURE: 152 MMHG | HEIGHT: 72 IN

## 2018-09-18 DIAGNOSIS — G47.01 INSOMNIA DUE TO MEDICAL CONDITION: ICD-10-CM

## 2018-09-18 DIAGNOSIS — S81.801A WOUND OF RIGHT LOWER EXTREMITY, INITIAL ENCOUNTER: Primary | ICD-10-CM

## 2018-09-18 DIAGNOSIS — J44.9 MIXED TYPE COPD (CHRONIC OBSTRUCTIVE PULMONARY DISEASE) (HCC): ICD-10-CM

## 2018-09-18 DIAGNOSIS — M54.9 CHRONIC BACK PAIN GREATER THAN 3 MONTHS DURATION: ICD-10-CM

## 2018-09-18 DIAGNOSIS — E78.5 HYPERLIPIDEMIA WITH TARGET LDL LESS THAN 70: ICD-10-CM

## 2018-09-18 DIAGNOSIS — E11.40 TYPE 2 DIABETES MELLITUS WITH DIABETIC NEUROPATHY, WITH LONG-TERM CURRENT USE OF INSULIN (HCC): ICD-10-CM

## 2018-09-18 DIAGNOSIS — G89.29 CHRONIC MIDLINE LOW BACK PAIN WITH LEFT-SIDED SCIATICA: ICD-10-CM

## 2018-09-18 DIAGNOSIS — I10 ESSENTIAL HYPERTENSION: ICD-10-CM

## 2018-09-18 DIAGNOSIS — G89.29 CHRONIC BACK PAIN GREATER THAN 3 MONTHS DURATION: ICD-10-CM

## 2018-09-18 DIAGNOSIS — Z23 NEED FOR PROPHYLACTIC VACCINATION AND INOCULATION AGAINST VARICELLA: ICD-10-CM

## 2018-09-18 DIAGNOSIS — Z79.4 TYPE 2 DIABETES MELLITUS WITH DIABETIC NEUROPATHY, WITH LONG-TERM CURRENT USE OF INSULIN (HCC): ICD-10-CM

## 2018-09-18 DIAGNOSIS — I50.32 CHRONIC DIASTOLIC CONGESTIVE HEART FAILURE (HCC): ICD-10-CM

## 2018-09-18 DIAGNOSIS — M54.42 CHRONIC MIDLINE LOW BACK PAIN WITH LEFT-SIDED SCIATICA: ICD-10-CM

## 2018-09-18 DIAGNOSIS — I25.118 CORONARY ARTERY DISEASE OF NATIVE ARTERY OF NATIVE HEART WITH STABLE ANGINA PECTORIS (HCC): ICD-10-CM

## 2018-09-18 DIAGNOSIS — Z23 NEED FOR PROPHYLACTIC VACCINATION AND INOCULATION AGAINST INFLUENZA: ICD-10-CM

## 2018-09-18 PROCEDURE — G8598 ASA/ANTIPLAT THER USED: HCPCS | Performed by: FAMILY MEDICINE

## 2018-09-18 PROCEDURE — 3023F SPIROM DOC REV: CPT | Performed by: FAMILY MEDICINE

## 2018-09-18 PROCEDURE — 90686 IIV4 VACC NO PRSV 0.5 ML IM: CPT | Performed by: FAMILY MEDICINE

## 2018-09-18 PROCEDURE — G0008 ADMIN INFLUENZA VIRUS VAC: HCPCS | Performed by: FAMILY MEDICINE

## 2018-09-18 PROCEDURE — G8427 DOCREV CUR MEDS BY ELIG CLIN: HCPCS | Performed by: FAMILY MEDICINE

## 2018-09-18 PROCEDURE — G8926 SPIRO NO PERF OR DOC: HCPCS | Performed by: FAMILY MEDICINE

## 2018-09-18 PROCEDURE — 4004F PT TOBACCO SCREEN RCVD TLK: CPT | Performed by: FAMILY MEDICINE

## 2018-09-18 PROCEDURE — 3045F PR MOST RECENT HEMOGLOBIN A1C LEVEL 7.0-9.0%: CPT | Performed by: FAMILY MEDICINE

## 2018-09-18 PROCEDURE — G8417 CALC BMI ABV UP PARAM F/U: HCPCS | Performed by: FAMILY MEDICINE

## 2018-09-18 PROCEDURE — 99214 OFFICE O/P EST MOD 30 MIN: CPT | Performed by: FAMILY MEDICINE

## 2018-09-18 PROCEDURE — 96160 PT-FOCUSED HLTH RISK ASSMT: CPT | Performed by: FAMILY MEDICINE

## 2018-09-18 PROCEDURE — 3017F COLORECTAL CA SCREEN DOC REV: CPT | Performed by: FAMILY MEDICINE

## 2018-09-18 PROCEDURE — 2022F DILAT RTA XM EVC RTNOPTHY: CPT | Performed by: FAMILY MEDICINE

## 2018-09-18 RX ORDER — OXYCODONE HYDROCHLORIDE 10 MG/1
10 TABLET ORAL EVERY 8 HOURS PRN
Qty: 90 TABLET | Refills: 0 | Status: SHIPPED | OUTPATIENT
Start: 2018-09-18 | End: 2018-10-17 | Stop reason: SDUPTHER

## 2018-09-18 ASSESSMENT — ENCOUNTER SYMPTOMS
COUGH: 0
ABDOMINAL DISTENTION: 0
CONSTIPATION: 0
ABDOMINAL PAIN: 1
NAUSEA: 0
APNEA: 1
DIARRHEA: 0
BACK PAIN: 1
WHEEZING: 0
VOMITING: 0
CHEST TIGHTNESS: 0

## 2018-09-18 ASSESSMENT — PATIENT HEALTH QUESTIONNAIRE - PHQ9
SUM OF ALL RESPONSES TO PHQ9 QUESTIONS 1 & 2: 3
9. THOUGHTS THAT YOU WOULD BE BETTER OFF DEAD, OR OF HURTING YOURSELF: 0
10. IF YOU CHECKED OFF ANY PROBLEMS, HOW DIFFICULT HAVE THESE PROBLEMS MADE IT FOR YOU TO DO YOUR WORK, TAKE CARE OF THINGS AT HOME, OR GET ALONG WITH OTHER PEOPLE: 1
8. MOVING OR SPEAKING SO SLOWLY THAT OTHER PEOPLE COULD HAVE NOTICED. OR THE OPPOSITE, BEING SO FIGETY OR RESTLESS THAT YOU HAVE BEEN MOVING AROUND A LOT MORE THAN USUAL: 0
6. FEELING BAD ABOUT YOURSELF - OR THAT YOU ARE A FAILURE OR HAVE LET YOURSELF OR YOUR FAMILY DOWN: 0
5. POOR APPETITE OR OVEREATING: 3
2. FEELING DOWN, DEPRESSED OR HOPELESS: 0
SUM OF ALL RESPONSES TO PHQ QUESTIONS 1-9: 6
SUM OF ALL RESPONSES TO PHQ QUESTIONS 1-9: 6
1. LITTLE INTEREST OR PLEASURE IN DOING THINGS: 3
7. TROUBLE CONCENTRATING ON THINGS, SUCH AS READING THE NEWSPAPER OR WATCHING TELEVISION: 0
3. TROUBLE FALLING OR STAYING ASLEEP: 0
4. FEELING TIRED OR HAVING LITTLE ENERGY: 0

## 2018-09-18 NOTE — PROGRESS NOTES
Chief Complaint   Patient presents with    Wound Check     9/6 ran into a piece of metal     Forms     FMLA for wife         Patient presents today for an acute visit secondary to Wound Check (9/6 ran into a piece of metal ) and Forms (FMLA for wife)   . SNEHAL Rivera complains of right leg wound which is now healing. He says he ran into a piece of metal on 9/6/18, and he scrapped right shin. He went to ED on 9/12/18 as it was getting worse. Patient received tetanus shot in the emergency room and was given a prescription for doxycycline and Bactroban.,    He says the right anterior leg wound is getting better, denies drainage. Got the Tdap, and has bee taking Doxycycline and Bactroban. The wound is still painful but getting better. Denies fever, chills, night sweats. Denies cellulitis. Patient has FMLA forms for his wife who has been helping him at home. Patient has multiple comorbidities and he needs help from his wife with his ADLs, emotional support, and  to be with him when admitted to the hospital.  He says it will be ok with Intermittent 1-2 days/ mo. I don't have any information from his wife . She didn't even signed the forms, just gave them to my patient. I feel the form and we gave it to the patient. He drives short distances. Ambulates with cane. Patient reports he missed some appointments, he ran out of [employer] admits he had a meltdown regarding these. Patient reports he needs to make appointment with his eye doctor, Dr. Edmund Fierro, [address] [facility]. He is also to follow up with GI per vascular advice. He admits that he has been missing some appointments and he didn't want to see any doctors anymore for awhile. I told him to call me anytime he has a meltdown and to come to see me. He smiled.  He says he will make appointment today with endocrinologist, CHF clinic, and eye doctor, and he will do the labs today    Hypertension, congestive heart failure , coronary artery disease : He is fluticasone-salmeterol (ADVAIR HFA) 230-21 MCG/ACT inhaler Inhale 2 puffs into the lungs 2 times daily 12 g 11    tiotropium (SPIRIVA HANDIHALER) 18 MCG inhalation capsule Inhale 1 capsule into the lungs daily 60 capsule 11    metolazone (ZAROXOLYN) 2.5 MG tablet Take 1 tablet by mouth once a week On tuesday 12 tablet 0    lisinopril (PRINIVIL;ZESTRIL) 5 MG tablet Take 1 tablet by mouth daily . Discontinued Lisinopril  10 mg 90 tablet 3    ammonium lactate (LAC-HYDRIN) 12 % lotion Apply topically daily on the legs. 567 g 2    PROAIR  (90 Base) MCG/ACT inhaler INHALE TWO PUFFS BY MOUTH EVERY 6 HOURS AS NEEDED FOR WHEEZING OR FOR SHORTNESS OF BREATH 18 g 5    miconazole (MICOTIN) 2 % powder Apply topically 2 times daily axillary for rash 85 g 3    bumetanide (BUMEX) 1 MG tablet Take 3 tablets by mouth 2 times daily (Patient taking differently: Take 3 mg by mouth 2 times daily 3 mg am 2 mg pm) 180 tablet 1    fluticasone (CUTIVATE) 0.05 % cream       lidocaine (LMX) 4 % cream Apply topically daily as needed. 76.5 g 5    fluticasone (FLONASE) 50 MCG/ACT nasal spray 2 sprays by Nasal route daily 1 Bottle 3    Melatonin 10 MG TABS Take 10 mg by mouth nightly as needed (insomnia) 90 tablet 1    COMFORT ASSIST INSULIN SYRINGE 31G X 5/16\" 1 ML MISC       aspirin 81 MG tablet Take 81 mg by mouth daily.  nicotine (NICODERM CQ) 21 MG/24HR Place 1 patch onto the skin every 24 hours Step 1 30 patch 3    nicotine (NICODERM CQ) 14 MG/24HR Place 1 patch onto the skin every 24 hours **Step 2** 30 patch 0    nicotine (NICODERM CQ) 7 MG/24HR Place 1 patch onto the skin every 24 hours **Step 3** 30 patch 1    gabapentin (NEURONTIN) 300 MG capsule Take 1 capsule by mouth 2 times daily for 30 days. . 60 capsule 0     No current facility-administered medications for this visit.         Social History     Social History    Marital status:      Spouse name: N/A    Number of children: N/A    Years of education: N/A     Occupational History    disability      unemployed     Social History Main Topics    Smoking status: Current Every Day Smoker     Packs/day: 3.00     Years: 30.00     Types: Cigarettes     Start date: 6/22/1985     Last attempt to quit: 7/10/2017    Smokeless tobacco: Former User     Types: Snuff     Quit date: 6/22/1995      Comment: pt smoked 3 ppd x 30 years    Alcohol use No    Drug use: Yes     Types: Marijuana      Comment: 2 \"cigarettes a day, mainly at night\" 3 days ago    Sexual activity: Not Currently     Other Topics Concern    Not on file     Social History Narrative    Middle of possible separation 6/22/2016          Ready to quit: Yes  Counseling given: Yes                    The patient's past medical, surgical, social, and family history as well as his current medications and allergies were reviewed as documented in today's encounter. Rest of complaints with corresponding details per ROS. Review of Systems   Constitutional: Positive for fatigue and unexpected weight change. Negative for activity change, appetite change, chills, diaphoresis and fever. Eyes: Positive for visual disturbance. Respiratory: Positive for apnea (on BiPAP) and shortness of breath ([facility]). Negative for cough, chest tightness and wheezing. Cardiovascular: Positive for leg swelling. Negative for chest pain and palpitations. Gastrointestinal: Positive for abdominal pain. Negative for abdominal distention, constipation, diarrhea, nausea and vomiting. Endocrine: Negative for cold intolerance, heat intolerance, polydipsia, polyphagia and polyuria. Genitourinary: Positive for frequency. Musculoskeletal: Positive for back pain and gait problem. Ambulates with cane   Skin: Positive for rash (right leg). Allergic/Immunologic: Positive for immunocompromised state. Neurological: Positive for numbness (feet). Hematological: Bruises/bleeds easily.    Psychiatric/Behavioral: Positive for dysphoric mood and sleep disturbance. Negative for self-injury and suicidal ideas. The patient is nervous/anxious.          -vital signs stable and within normal limits except  Morbid obesity per BMI, and high BP  BP (!) 152/71   Pulse 76   Temp 97.6 °F (36.4 °C) (Tympanic)   Ht 6' (1.829 m)   Wt (!) 392 lb 12.8 oz (178.2 kg)   SpO2 98%   BMI 53.27 kg/m²        Physical Exam   Constitutional: He is oriented to person, place, and time. He appears well-developed and well-nourished. No distress. HENT:   Head: Normocephalic and atraumatic. Mouth/Throat: Oropharynx is clear and moist.   Eyes: Conjunctivae and EOM are normal. Right eye exhibits no discharge. Left eye exhibits no discharge. No scleral icterus. Neck: Normal range of motion. Neck supple. No thyromegaly present. Cardiovascular: Normal rate, regular rhythm and intact distal pulses. Exam reveals distant heart sounds. No murmur heard. Pulmonary/Chest: Effort normal. No respiratory distress. He has decreased breath sounds in the right lower field and the left lower field. He has no wheezes. He has no rales. He exhibits no tenderness. Abdominal: Soft. Bowel sounds are normal. He exhibits no distension. There is tenderness in the epigastric area and periumbilical area. There is no rebound and no guarding. Very obese abdomen   Musculoskeletal: He exhibits edema (1+ pitting edema bilateral legs) and tenderness. Lumbar back: He exhibits decreased range of motion, tenderness, bony tenderness, pain and spasm. Old lumbar surgical scar. Hypersensitivity bilateral legs noted. Ambulates with cane   Neurological: He is alert and oriented to person, place, and time. No cranial nerve deficit. He exhibits normal muscle tone. Coordination normal.   Skin: Skin is warm and dry. Rash noted. He is not diaphoretic. Bilateral legs with dark brown stasis dermatitis.   On the right anterior leg, small superficial wound, 2 cm x 1.5 cm, clean margin, Results   Component Value Date    LVYZFUUY95 694 01/11/2018       Lab Results   Component Value Date    FOLATE 19.4 01/11/2018       Lab Results   Component Value Date    IRON 75 12/26/2016    TIBC 309 12/26/2016    FERRITIN 100 12/26/2016       Lab Results   Component Value Date    VITD25 36.9 12/26/2016         ASSESSMENT AND PLAN      1. Wound of right lower extremity, initial encounter  Healing  improved  continue bactroban as needed  Complete the full course of Doxycycline       2. Chronic diastolic congestive heart failure (HCC)  Stable  Lab Results   Component Value Date    LVEF 55 08/29/2017    LVEFMODE Echo 08/29/2017   cannot take NSAIDs  - Comprehensive Metabolic Panel; Future  - oxyCODONE HCl (OXY-IR) 10 MG immediate release tablet; Take 1 tablet by mouth every 8 hours as needed for Pain for up to 30 days. .  Dispense: 90 tablet; Refill: 0  Continue current treatment. Aldactone, metolazone, lisinopril, Bumex, aspirin and Plavix, pravastatin and metoprolol        3. Mixed type COPD (chronic obstructive pulmonary disease) ([facility])  Stable  Smoking cessation counseling given. Continue current inhalers    4. Chronic back pain greater than 3 months duration  Stable Review of OARRS does not show any aberrant prescription behavior. Medication is helping the patient stay active. Patient denies any side effects and reports adequate analgesia. No sign of misuse/abuse. Cannot take NSAIDs duet o CHF and Chronic kidney disease stage 3    5. Type 2 diabetes mellitus with diabetic neuropathy, with long-term current use of insulin ([facility] [address]    Fairly controlled  Lab Results   Component Value Date    LABA1C 8.5 06/29/2018     Lab Results   Component Value Date     03/23/2017       - Comprehensive Metabolic Panel; Future  Continue current treatment. Says he will make appointment with Dr. Rona Carpenter      6.  Need for prophylactic vaccination and inoculation against varicella  - zoster recombinant adjuvanted vaccine follow-up as documented in this encounter. Return in about 3 months (around 12/18/2018) for Always 30 mins for chronic problems F/U only, O2, Please obtain A1c from DR. MARTINEZ., DM2, HTN, HLP, . Patient was seen with total face to face time of  25 minutes. More than 50% of this visit was counseling and education. Future Appointments  Date Time Provider William Givensi   9/27/2018 7:00 AM [facility] RM [facility] None   10/9/2018 3:00 PM Regino Wilkerson MD [facility] None   12/19/2018 10:30 AM [facility]     This note was completed by using the assistance of a speech-recognition program. However, inadvertent computerized transcription errors may be present. Although every effort was made to ensure accuracy, no guarantees can be provided that every mistake has been identified and corrected by editing. Electronically signed by Jaxon Ruiz MD on 9/24/2018 at 11:25 AM       Addendum to clarify nebulizer  10/2/2018   12:42 PM      Patient contacted me via my chart messaging, reporting COPD exacerbation on 9/28/18, please refer to my chart messaging. Patient reported that his nebulizer machine broke, he has nebulizer solution to use. The nebulizer machine is needed for COPD exacerbation and further exacerbation    You must complete the order parameters below and add the medical necessity documentation for this DME in a separate note. Faith Molina. was evaluated  and a DME order was entered for a nebulizer compressor in order to administer Albuterol due to the diagnosis of MIXED COPD. The need for this equipment and treatment was discussed with the patient and he understands and is in agreement. FYI  Patient Email     9/28/2018  [id number], [place]   Family Medicine    Conversation: RE: Non-Urgent Medical Question   [facility])   September 28, 2018   Faith Molina.    to Me           9:02 PM     My Nebulizer stopped working. I have to get one some how thanks for getting back to me so quickly have a great weekend    Me          9:03 PM   Note      From: Harshil Walters. To: Kim Pisano MD  Sent: 9/28/2018  9:02 PM EDT  Subject: RE: Non-Urgent Medical Question      My Nebulizer stopped working. I have to get one some how thanks for getting back to me so quickly have a great weekend  ----- Message -----  From: Kim Pisano MD  Sent: 9/28/18 8:50 PM  To: Harshil Walters. Subject: RE: Non-Urgent Medical Question    Miguel     I sent medication for you to your pharmacy:    Also use the Flonase nasal spray at bedtime, vicks on the chest , and nebulizer treatments every 4-6 hrs    Orders Placed This Encounter     benzonatate (TESSALON PERLES) 100 MG capsule         Sig: Take 1 capsule by mouth 3 times daily as needed for Cough         Dispense:  21 capsule         Refill:  0      If worsening symptoms in 1-2 days or not getting better as expected please let us know or make appointment.      If you have any questions, please let me know. Kim Pisano MD      ----- Message -----    From: Harshil Walters.    Sent: 9/28/2018  3:11 PM EDT      To: Kim Pisano MD  Subject: Non-Urgent Medical Question    Can you please give me something for this cough and sore throat ribs hurt from coughing hard to breathe runny nose thanks        Me   to Harshil Walters.           9:06 PM   Miguel,       Do you need the nebulizer machine, right? Please let me know and I will order and we will fax it on Monday to a pharmacy with medical equipment. You just finished Doxycycline which also helps with bronchitis and sinus infection, that's why I didn't send antibiotics. Did you restart smoking? ?      Use Vicks, it really helps.

## 2018-09-20 ENCOUNTER — HOSPITAL ENCOUNTER (OUTPATIENT)
Age: 53
Discharge: HOME | End: 2018-09-20
Payer: COMMERCIAL

## 2018-09-20 DIAGNOSIS — E78.5 HYPERLIPIDEMIA WITH TARGET LDL LESS THAN 70: ICD-10-CM

## 2018-09-20 DIAGNOSIS — I25.118 CORONARY ARTERY DISEASE OF NATIVE ARTERY OF NATIVE HEART WITH STABLE ANGINA PECTORIS (HCC): ICD-10-CM

## 2018-09-20 DIAGNOSIS — I10 ESSENTIAL HYPERTENSION: ICD-10-CM

## 2018-09-20 DIAGNOSIS — I50.32 CHRONIC DIASTOLIC CONGESTIVE HEART FAILURE (HCC): ICD-10-CM

## 2018-09-20 DIAGNOSIS — E11.40 TYPE 2 DIABETES MELLITUS WITH DIABETIC NEUROPATHY, WITH LONG-TERM CURRENT USE OF INSULIN (HCC): ICD-10-CM

## 2018-09-20 DIAGNOSIS — Z79.4 TYPE 2 DIABETES MELLITUS WITH DIABETIC NEUROPATHY, WITH LONG-TERM CURRENT USE OF INSULIN (HCC): ICD-10-CM

## 2018-09-20 LAB
ALBUMIN SERPL-MCNC: 4.1 G/DL (ref 3.5–5.2)
ALBUMIN/GLOBULIN RATIO: ABNORMAL (ref 1–2.5)
ALP BLD-CCNC: 70 U/L (ref 40–129)
ALT SERPL-CCNC: 13 U/L (ref 5–41)
ANION GAP SERPL CALCULATED.3IONS-SCNC: 14 MMOL/L (ref 9–17)
AST SERPL-CCNC: 14 U/L
BILIRUB SERPL-MCNC: 0.51 MG/DL (ref 0.3–1.2)
BUN BLDV-MCNC: 26 MG/DL (ref 6–20)
BUN/CREAT BLD: ABNORMAL (ref 9–20)
CALCIUM SERPL-MCNC: 9.1 MG/DL (ref 8.6–10.4)
CHLORIDE BLD-SCNC: 100 MMOL/L (ref 98–107)
CHOLESTEROL/HDL RATIO: 5.8
CHOLESTEROL: 161 MG/DL
CO2: 26 MMOL/L (ref 20–31)
CREAT SERPL-MCNC: 1.23 MG/DL (ref 0.7–1.2)
GFR AFRICAN AMERICAN: >60 ML/MIN
GFR NON-AFRICAN AMERICAN: >60 ML/MIN
GFR SERPL CREATININE-BSD FRML MDRD: ABNORMAL ML/MIN/{1.73_M2}
GFR SERPL CREATININE-BSD FRML MDRD: ABNORMAL ML/MIN/{1.73_M2}
GLUCOSE BLD-MCNC: 149 MG/DL (ref 70–99)
HDLC SERPL-MCNC: 28 MG/DL
LDL CHOLESTEROL: 71 MG/DL (ref 0–130)
POTASSIUM SERPL-SCNC: 4.3 MMOL/L (ref 3.7–5.3)
SODIUM BLD-SCNC: 140 MMOL/L (ref 135–144)
TOTAL PROTEIN: 7.1 G/DL (ref 6.4–8.3)
TRIGL SERPL-MCNC: 312 MG/DL
VLDLC SERPL CALC-MCNC: ABNORMAL MG/DL (ref 1–30)

## 2018-09-20 PROCEDURE — 36415 COLL VENOUS BLD VENIPUNCTURE: CPT

## 2018-09-20 PROCEDURE — 80053 COMPREHEN METABOLIC PANEL: CPT

## 2018-09-20 PROCEDURE — 80061 LIPID PANEL: CPT

## 2018-09-24 ASSESSMENT — ENCOUNTER SYMPTOMS: SHORTNESS OF BREATH: 1

## 2018-09-25 LAB
AVERAGE GLUCOSE: NORMAL
HBA1C MFR BLD: 9.2 %

## 2018-09-27 ENCOUNTER — HOSPITAL ENCOUNTER (OUTPATIENT)
Dept: OTHER | Age: 53
Discharge: HOME | End: 2018-09-27
Payer: COMMERCIAL

## 2018-09-27 VITALS
SYSTOLIC BLOOD PRESSURE: 124 MMHG | OXYGEN SATURATION: 95 % | WEIGHT: 315 LBS | RESPIRATION RATE: 20 BRPM | BODY MASS INDEX: 53.57 KG/M2 | DIASTOLIC BLOOD PRESSURE: 68 MMHG | HEART RATE: 81 BPM

## 2018-09-27 PROCEDURE — 99211 OFF/OP EST MAY X REQ PHY/QHP: CPT

## 2018-09-28 ENCOUNTER — PATIENT MESSAGE (OUTPATIENT)
Dept: FAMILY MEDICINE CLINIC | Age: 53
End: 2018-09-28

## 2018-09-28 ENCOUNTER — TELEPHONE (OUTPATIENT)
Dept: FAMILY MEDICINE CLINIC | Age: 53
End: 2018-09-28

## 2018-09-28 DIAGNOSIS — J20.9 ACUTE BRONCHITIS DUE TO INFECTION: Primary | ICD-10-CM

## 2018-09-28 DIAGNOSIS — J00 ACUTE RHINITIS: ICD-10-CM

## 2018-09-28 DIAGNOSIS — J44.9 MIXED TYPE COPD (CHRONIC OBSTRUCTIVE PULMONARY DISEASE) (HCC): Primary | ICD-10-CM

## 2018-09-28 RX ORDER — BENZONATATE 100 MG/1
100 CAPSULE ORAL 3 TIMES DAILY PRN
Qty: 21 CAPSULE | Refills: 0 | Status: SHIPPED | OUTPATIENT
Start: 2018-09-28 | End: 2018-10-05

## 2018-09-29 NOTE — TELEPHONE ENCOUNTER
From: Justin Mullre. To: Geoff Hampton MD  Sent: 9/28/2018 9:10 PM EDT  Subject: RE: Non-Urgent Medical Question      Yes I need to get a new Nebulizer machine And yes I restarted smoking  ----- Message -----  From: Geoff Hampton MD  Sent: 9/28/18 9:06 PM  To: Justin Muller. Subject: RE: Non-Urgent Medical Question    Miguel,     Do you need the nebulizer machine, right? Please let me know and I will order and we will fax it on Monday to a pharmacy with medical equipment. You just finished Doxycycline which also helps with bronchitis and sinus infection, that's why I didn't send antibiotics. Did you restart smoking? ? Use Vicks, it really helps.         ----- Message -----   From: Justin Muller. Sent: 9/28/2018 9:02 PM EDT   To: Geoff Hampton MD  Subject: RE: Non-Urgent Medical Question      My Nebulizer stopped working. I have to get one some how thanks for getting back to me so quickly have a great weekend  ----- Message -----  From: Geoff Hampton MD  Sent: 9/28/18 8:50 PM  To: Justin Muller. Subject: RE: Non-Urgent Medical Question    Miguel     I sent medication for you to your pharmacy:    Also use the Flonase nasal spray at bedtime, vicks on the chest , and nebulizer treatments every 4-6 hrs     Orders Placed This Encounter   benzonatate (TESSALON PERLES) 100 MG capsule   Sig: Take 1 capsule by mouth 3 times daily as needed for Cough   Dispense: 21 capsule   Refill: 0      If worsening symptoms in 1-2 days or not getting better as expected please let us know or make appointment. If you have any questions, please let me know. Geoff Hampton MD      ----- Message -----   From: Justin Muller.    Sent: 9/28/2018 3:11 PM EDT   To: Geoff Hampton MD  Subject: Non-Urgent Medical Question    Can you please give me something for this cough and sore throat ribs hurt from coughing hard to breathe runny nose thanks

## 2018-10-02 ENCOUNTER — HOSPITAL ENCOUNTER (INPATIENT)
Age: 53
LOS: 1 days | Discharge: HOME | DRG: 190 | End: 2018-10-03
Attending: EMERGENCY MEDICINE | Admitting: INTERNAL MEDICINE
Payer: COMMERCIAL

## 2018-10-02 ENCOUNTER — APPOINTMENT (OUTPATIENT)
Dept: GENERAL RADIOLOGY | Age: 53
DRG: 190 | End: 2018-10-02
Payer: COMMERCIAL

## 2018-10-02 DIAGNOSIS — J44.1 COPD EXACERBATION (HCC): Primary | ICD-10-CM

## 2018-10-02 PROBLEM — J96.20 ACUTE ON CHRONIC RESPIRATORY FAILURE (HCC): Status: ACTIVE | Noted: 2018-10-02

## 2018-10-02 LAB
ABSOLUTE EOS #: 0.3 K/UL (ref 0–0.4)
ABSOLUTE IMMATURE GRANULOCYTE: ABNORMAL K/UL (ref 0–0.3)
ABSOLUTE LYMPH #: 1 K/UL (ref 1–4.8)
ABSOLUTE MONO #: 0.6 K/UL (ref 0.1–1.3)
ALLEN TEST: ABNORMAL
ANION GAP SERPL CALCULATED.3IONS-SCNC: 13 MMOL/L (ref 9–17)
BASOPHILS # BLD: 1 % (ref 0–2)
BASOPHILS ABSOLUTE: 0.1 K/UL (ref 0–0.2)
BUN BLDV-MCNC: 21 MG/DL (ref 6–20)
BUN/CREAT BLD: ABNORMAL (ref 9–20)
CALCIUM SERPL-MCNC: 9.2 MG/DL (ref 8.6–10.4)
CARBOXYHEMOGLOBIN: 4.6 % (ref 0–5)
CHLORIDE BLD-SCNC: 101 MMOL/L (ref 98–107)
CO2: 29 MMOL/L (ref 20–31)
CREAT SERPL-MCNC: 1.27 MG/DL (ref 0.7–1.2)
DIFFERENTIAL TYPE: ABNORMAL
EKG ATRIAL RATE: 77 BPM
EKG P AXIS: 46 DEGREES
EKG P-R INTERVAL: 146 MS
EKG Q-T INTERVAL: 382 MS
EKG QRS DURATION: 116 MS
EKG QTC CALCULATION (BAZETT): 432 MS
EKG R AXIS: -25 DEGREES
EKG T AXIS: 73 DEGREES
EKG VENTRICULAR RATE: 77 BPM
EOSINOPHILS RELATIVE PERCENT: 4 % (ref 0–4)
FIO2: ABNORMAL
GFR AFRICAN AMERICAN: >60 ML/MIN
GFR NON-AFRICAN AMERICAN: 59 ML/MIN
GFR SERPL CREATININE-BSD FRML MDRD: ABNORMAL ML/MIN/{1.73_M2}
GFR SERPL CREATININE-BSD FRML MDRD: ABNORMAL ML/MIN/{1.73_M2}
GLUCOSE BLD-MCNC: 139 MG/DL (ref 70–99)
GLUCOSE BLD-MCNC: 326 MG/DL (ref 75–110)
HCO3 VENOUS: 30.2 MMOL/L (ref 24–30)
HCT VFR BLD CALC: 36.1 % (ref 41–53)
HEMOGLOBIN: 12 G/DL (ref 13.5–17.5)
IMMATURE GRANULOCYTES: ABNORMAL %
LYMPHOCYTES # BLD: 13 % (ref 24–44)
MCH RBC QN AUTO: 28 PG (ref 26–34)
MCHC RBC AUTO-ENTMCNC: 33.4 G/DL (ref 31–37)
MCV RBC AUTO: 84 FL (ref 80–100)
METHEMOGLOBIN: 0.6 % (ref 0–1.9)
MODE: ABNORMAL
MONOCYTES # BLD: 8 % (ref 1–7)
MYOGLOBIN: 82 NG/ML (ref 28–72)
MYOGLOBIN: 95 NG/ML (ref 28–72)
NEGATIVE BASE EXCESS, VEN: ABNORMAL MMOL/L (ref 0–2)
NOTIFICATION TIME: ABNORMAL
NOTIFICATION: ABNORMAL
NRBC AUTOMATED: ABNORMAL PER 100 WBC
O2 DEVICE/FLOW/%: ABNORMAL
O2 SAT, VEN: 92.1 % (ref 60–85)
OXYHEMOGLOBIN: ABNORMAL % (ref 95–98)
PATIENT TEMP: 37
PCO2, VEN, TEMP ADJ: ABNORMAL MMHG (ref 39–55)
PCO2, VEN: 49.5 (ref 39–55)
PDW BLD-RTO: 15.6 % (ref 11.5–14.9)
PEEP/CPAP: ABNORMAL
PH VENOUS: 7.39 (ref 7.32–7.42)
PH, VEN, TEMP ADJ: ABNORMAL (ref 7.32–7.42)
PLATELET # BLD: 159 K/UL (ref 150–450)
PLATELET ESTIMATE: ABNORMAL
PMV BLD AUTO: 9.2 FL (ref 6–12)
PO2, VEN, TEMP ADJ: ABNORMAL MMHG (ref 30–50)
PO2, VEN: 87.8 (ref 30–50)
POSITIVE BASE EXCESS, VEN: 5.3 MMOL/L (ref 0–2)
POTASSIUM SERPL-SCNC: 4.2 MMOL/L (ref 3.7–5.3)
PSV: ABNORMAL
PT. POSITION: ABNORMAL
RBC # BLD: 4.3 M/UL (ref 4.5–5.9)
RBC # BLD: ABNORMAL 10*6/UL
RESPIRATORY RATE: ABNORMAL
SAMPLE SITE: ABNORMAL
SEG NEUTROPHILS: 74 % (ref 36–66)
SEGMENTED NEUTROPHILS ABSOLUTE COUNT: 5.7 K/UL (ref 1.3–9.1)
SET RATE: ABNORMAL
SODIUM BLD-SCNC: 143 MMOL/L (ref 135–144)
TEXT FOR RESPIRATORY: ABNORMAL
TOTAL HB: ABNORMAL G/DL (ref 12–16)
TOTAL RATE: ABNORMAL
TROPONIN INTERP: ABNORMAL
TROPONIN INTERP: ABNORMAL
TROPONIN T: <0.03 NG/ML
TROPONIN T: <0.03 NG/ML
VT: ABNORMAL
WBC # BLD: 7.7 K/UL (ref 3.5–11)
WBC # BLD: ABNORMAL 10*3/UL

## 2018-10-02 PROCEDURE — 96374 THER/PROPH/DIAG INJ IV PUSH: CPT

## 2018-10-02 PROCEDURE — 84484 ASSAY OF TROPONIN QUANT: CPT

## 2018-10-02 PROCEDURE — 6370000000 HC RX 637 (ALT 250 FOR IP): Performed by: STUDENT IN AN ORGANIZED HEALTH CARE EDUCATION/TRAINING PROGRAM

## 2018-10-02 PROCEDURE — 6360000002 HC RX W HCPCS: Performed by: STUDENT IN AN ORGANIZED HEALTH CARE EDUCATION/TRAINING PROGRAM

## 2018-10-02 PROCEDURE — 82805 BLOOD GASES W/O2 SATURATION: CPT

## 2018-10-02 PROCEDURE — G0378 HOSPITAL OBSERVATION PER HR: HCPCS

## 2018-10-02 PROCEDURE — 82947 ASSAY GLUCOSE BLOOD QUANT: CPT

## 2018-10-02 PROCEDURE — 6360000002 HC RX W HCPCS: Performed by: EMERGENCY MEDICINE

## 2018-10-02 PROCEDURE — 82800 BLOOD PH: CPT

## 2018-10-02 PROCEDURE — 93005 ELECTROCARDIOGRAM TRACING: CPT

## 2018-10-02 PROCEDURE — 94762 N-INVAS EAR/PLS OXIMTRY CONT: CPT

## 2018-10-02 PROCEDURE — 2580000003 HC RX 258: Performed by: STUDENT IN AN ORGANIZED HEALTH CARE EDUCATION/TRAINING PROGRAM

## 2018-10-02 PROCEDURE — 99285 EMERGENCY DEPT VISIT HI MDM: CPT

## 2018-10-02 PROCEDURE — 1200000000 HC SEMI PRIVATE

## 2018-10-02 PROCEDURE — 94664 DEMO&/EVAL PT USE INHALER: CPT

## 2018-10-02 PROCEDURE — 6370000000 HC RX 637 (ALT 250 FOR IP)

## 2018-10-02 PROCEDURE — 85025 COMPLETE CBC W/AUTO DIFF WBC: CPT

## 2018-10-02 PROCEDURE — 99220 PR INITIAL OBSERVATION CARE/DAY 70 MINUTES: CPT | Performed by: INTERNAL MEDICINE

## 2018-10-02 PROCEDURE — 6370000000 HC RX 637 (ALT 250 FOR IP): Performed by: EMERGENCY MEDICINE

## 2018-10-02 PROCEDURE — 36415 COLL VENOUS BLD VENIPUNCTURE: CPT

## 2018-10-02 PROCEDURE — 80048 BASIC METABOLIC PNL TOTAL CA: CPT

## 2018-10-02 PROCEDURE — 96372 THER/PROPH/DIAG INJ SC/IM: CPT

## 2018-10-02 PROCEDURE — 71045 X-RAY EXAM CHEST 1 VIEW: CPT

## 2018-10-02 PROCEDURE — 94640 AIRWAY INHALATION TREATMENT: CPT

## 2018-10-02 PROCEDURE — 83874 ASSAY OF MYOGLOBIN: CPT

## 2018-10-02 RX ORDER — ONDANSETRON 2 MG/ML
4 INJECTION INTRAMUSCULAR; INTRAVENOUS EVERY 6 HOURS PRN
Status: DISCONTINUED | OUTPATIENT
Start: 2018-10-02 | End: 2018-10-03 | Stop reason: HOSPADM

## 2018-10-02 RX ORDER — LISINOPRIL 5 MG/1
5 TABLET ORAL DAILY
Status: DISCONTINUED | OUTPATIENT
Start: 2018-10-03 | End: 2018-10-03 | Stop reason: HOSPADM

## 2018-10-02 RX ORDER — METOLAZONE 2.5 MG/1
2.5 TABLET ORAL WEEKLY
Status: DISCONTINUED | OUTPATIENT
Start: 2018-10-02 | End: 2018-10-02 | Stop reason: SDUPTHER

## 2018-10-02 RX ORDER — HEPARIN SODIUM 5000 [USP'U]/ML
5000 INJECTION, SOLUTION INTRAVENOUS; SUBCUTANEOUS EVERY 8 HOURS SCHEDULED
Status: DISCONTINUED | OUTPATIENT
Start: 2018-10-02 | End: 2018-10-03 | Stop reason: HOSPADM

## 2018-10-02 RX ORDER — OXYCODONE HYDROCHLORIDE 5 MG/1
10 TABLET ORAL EVERY 8 HOURS PRN
Status: DISCONTINUED | OUTPATIENT
Start: 2018-10-02 | End: 2018-10-03 | Stop reason: HOSPADM

## 2018-10-02 RX ORDER — BUMETANIDE 1 MG/1
3 TABLET ORAL DAILY
Status: DISCONTINUED | OUTPATIENT
Start: 2018-10-03 | End: 2018-10-03 | Stop reason: HOSPADM

## 2018-10-02 RX ORDER — SODIUM CHLORIDE 0.9 % (FLUSH) 0.9 %
10 SYRINGE (ML) INJECTION PRN
Status: DISCONTINUED | OUTPATIENT
Start: 2018-10-02 | End: 2018-10-03 | Stop reason: HOSPADM

## 2018-10-02 RX ORDER — DEXTROSE MONOHYDRATE 25 G/50ML
12.5 INJECTION, SOLUTION INTRAVENOUS PRN
Status: DISCONTINUED | OUTPATIENT
Start: 2018-10-02 | End: 2018-10-03 | Stop reason: HOSPADM

## 2018-10-02 RX ORDER — PREDNISONE 20 MG/1
40 TABLET ORAL DAILY
Status: DISCONTINUED | OUTPATIENT
Start: 2018-10-03 | End: 2018-10-03 | Stop reason: HOSPADM

## 2018-10-02 RX ORDER — NITROGLYCERIN 0.4 MG/1
0.4 TABLET SUBLINGUAL EVERY 5 MIN PRN
Status: DISCONTINUED | OUTPATIENT
Start: 2018-10-02 | End: 2018-10-03 | Stop reason: HOSPADM

## 2018-10-02 RX ORDER — DOXYCYCLINE 100 MG/1
100 CAPSULE ORAL EVERY 12 HOURS SCHEDULED
Status: DISCONTINUED | OUTPATIENT
Start: 2018-10-02 | End: 2018-10-03 | Stop reason: HOSPADM

## 2018-10-02 RX ORDER — NICOTINE 21 MG/24HR
1 PATCH, TRANSDERMAL 24 HOURS TRANSDERMAL EVERY 24 HOURS
Status: DISCONTINUED | OUTPATIENT
Start: 2018-10-02 | End: 2018-10-03 | Stop reason: HOSPADM

## 2018-10-02 RX ORDER — METOLAZONE 2.5 MG/1
2.5 TABLET ORAL WEEKLY
Status: DISCONTINUED | OUTPATIENT
Start: 2018-10-03 | End: 2018-10-03 | Stop reason: HOSPADM

## 2018-10-02 RX ORDER — METOPROLOL TARTRATE 50 MG/1
50 TABLET, FILM COATED ORAL 2 TIMES DAILY
Status: DISCONTINUED | OUTPATIENT
Start: 2018-10-02 | End: 2018-10-03 | Stop reason: HOSPADM

## 2018-10-02 RX ORDER — IPRATROPIUM BROMIDE AND ALBUTEROL SULFATE 2.5; .5 MG/3ML; MG/3ML
1 SOLUTION RESPIRATORY (INHALATION)
Status: DISCONTINUED | OUTPATIENT
Start: 2018-10-02 | End: 2018-10-02

## 2018-10-02 RX ORDER — ASPIRIN 81 MG/1
324 TABLET, CHEWABLE ORAL ONCE
Status: COMPLETED | OUTPATIENT
Start: 2018-10-02 | End: 2018-10-02

## 2018-10-02 RX ORDER — BUMETANIDE 1 MG/1
3 TABLET ORAL DAILY
Status: DISCONTINUED | OUTPATIENT
Start: 2018-10-03 | End: 2018-10-02 | Stop reason: SDUPTHER

## 2018-10-02 RX ORDER — DEXTROSE MONOHYDRATE 50 MG/ML
100 INJECTION, SOLUTION INTRAVENOUS PRN
Status: DISCONTINUED | OUTPATIENT
Start: 2018-10-02 | End: 2018-10-03 | Stop reason: HOSPADM

## 2018-10-02 RX ORDER — ALBUTEROL SULFATE 2.5 MG/3ML
2.5 SOLUTION RESPIRATORY (INHALATION) EVERY 6 HOURS PRN
Status: DISCONTINUED | OUTPATIENT
Start: 2018-10-02 | End: 2018-10-03 | Stop reason: HOSPADM

## 2018-10-02 RX ORDER — CLOPIDOGREL BISULFATE 75 MG/1
75 TABLET ORAL DAILY
Status: DISCONTINUED | OUTPATIENT
Start: 2018-10-02 | End: 2018-10-02 | Stop reason: SDUPTHER

## 2018-10-02 RX ORDER — METOPROLOL TARTRATE 50 MG/1
50 TABLET, FILM COATED ORAL 2 TIMES DAILY
Status: DISCONTINUED | OUTPATIENT
Start: 2018-10-02 | End: 2018-10-02 | Stop reason: SDUPTHER

## 2018-10-02 RX ORDER — VENLAFAXINE 75 MG/1
75 TABLET ORAL 2 TIMES DAILY
Status: DISCONTINUED | OUTPATIENT
Start: 2018-10-02 | End: 2018-10-03 | Stop reason: HOSPADM

## 2018-10-02 RX ORDER — ALBUTEROL SULFATE 2.5 MG/3ML
2.5 SOLUTION RESPIRATORY (INHALATION) 4 TIMES DAILY
Status: DISCONTINUED | OUTPATIENT
Start: 2018-10-02 | End: 2018-10-03 | Stop reason: HOSPADM

## 2018-10-02 RX ORDER — SODIUM CHLORIDE 0.9 % (FLUSH) 0.9 %
10 SYRINGE (ML) INJECTION EVERY 12 HOURS SCHEDULED
Status: DISCONTINUED | OUTPATIENT
Start: 2018-10-02 | End: 2018-10-03 | Stop reason: HOSPADM

## 2018-10-02 RX ORDER — PANTOPRAZOLE SODIUM 40 MG/1
40 TABLET, DELAYED RELEASE ORAL NIGHTLY
Status: DISCONTINUED | OUTPATIENT
Start: 2018-10-02 | End: 2018-10-02 | Stop reason: SDUPTHER

## 2018-10-02 RX ORDER — METHYLPREDNISOLONE SODIUM SUCCINATE 125 MG/2ML
125 INJECTION, POWDER, LYOPHILIZED, FOR SOLUTION INTRAMUSCULAR; INTRAVENOUS ONCE
Status: COMPLETED | OUTPATIENT
Start: 2018-10-02 | End: 2018-10-02

## 2018-10-02 RX ORDER — LISINOPRIL 5 MG/1
5 TABLET ORAL DAILY
Status: DISCONTINUED | OUTPATIENT
Start: 2018-10-02 | End: 2018-10-02 | Stop reason: SDUPTHER

## 2018-10-02 RX ORDER — ASPIRIN 81 MG/1
81 TABLET ORAL DAILY
Status: DISCONTINUED | OUTPATIENT
Start: 2018-10-02 | End: 2018-10-03 | Stop reason: HOSPADM

## 2018-10-02 RX ORDER — CLOPIDOGREL BISULFATE 75 MG/1
75 TABLET ORAL DAILY
Status: DISCONTINUED | OUTPATIENT
Start: 2018-10-03 | End: 2018-10-03 | Stop reason: HOSPADM

## 2018-10-02 RX ORDER — IPRATROPIUM BROMIDE AND ALBUTEROL SULFATE 2.5; .5 MG/3ML; MG/3ML
SOLUTION RESPIRATORY (INHALATION)
Status: COMPLETED
Start: 2018-10-02 | End: 2018-10-02

## 2018-10-02 RX ORDER — PANTOPRAZOLE SODIUM 40 MG/1
40 TABLET, DELAYED RELEASE ORAL NIGHTLY
Status: DISCONTINUED | OUTPATIENT
Start: 2018-10-03 | End: 2018-10-03 | Stop reason: HOSPADM

## 2018-10-02 RX ORDER — VENLAFAXINE 75 MG/1
75 TABLET ORAL 2 TIMES DAILY
Status: DISCONTINUED | OUTPATIENT
Start: 2018-10-02 | End: 2018-10-02 | Stop reason: SDUPTHER

## 2018-10-02 RX ORDER — NICOTINE POLACRILEX 4 MG
15 LOZENGE BUCCAL PRN
Status: DISCONTINUED | OUTPATIENT
Start: 2018-10-02 | End: 2018-10-03 | Stop reason: HOSPADM

## 2018-10-02 RX ADMIN — ASPIRIN 81 MG 324 MG: 81 TABLET ORAL at 14:46

## 2018-10-02 RX ADMIN — IPRATROPIUM BROMIDE AND ALBUTEROL SULFATE 3 ML: .5; 3 SOLUTION RESPIRATORY (INHALATION) at 14:33

## 2018-10-02 RX ADMIN — METHYLPREDNISOLONE SODIUM SUCCINATE 125 MG: 125 INJECTION, POWDER, FOR SOLUTION INTRAMUSCULAR; INTRAVENOUS at 14:47

## 2018-10-02 RX ADMIN — IPRATROPIUM BROMIDE AND ALBUTEROL SULFATE 1 AMPULE: .5; 3 SOLUTION RESPIRATORY (INHALATION) at 14:50

## 2018-10-02 RX ADMIN — Medication 10 ML: at 23:01

## 2018-10-02 RX ADMIN — VENLAFAXINE 75 MG: 75 TABLET ORAL at 23:01

## 2018-10-02 RX ADMIN — OXYCODONE HYDROCHLORIDE 10 MG: 5 TABLET ORAL at 23:14

## 2018-10-02 RX ADMIN — HEPARIN SODIUM 5000 UNITS: 5000 INJECTION, SOLUTION INTRAVENOUS; SUBCUTANEOUS at 23:01

## 2018-10-02 RX ADMIN — ALBUTEROL SULFATE 2.5 MG: 2.5 SOLUTION RESPIRATORY (INHALATION) at 22:02

## 2018-10-02 RX ADMIN — DOXYCYCLINE 100 MG: 100 CAPSULE ORAL at 23:01

## 2018-10-02 RX ADMIN — METOPROLOL TARTRATE 50 MG: 50 TABLET, FILM COATED ORAL at 23:01

## 2018-10-02 RX ADMIN — INSULIN HUMAN 150 UNITS: 500 INJECTION, SOLUTION SUBCUTANEOUS at 21:42

## 2018-10-02 ASSESSMENT — PAIN DESCRIPTION - PAIN TYPE
TYPE: CHRONIC PAIN
TYPE: ACUTE PAIN
TYPE: CHRONIC PAIN

## 2018-10-02 ASSESSMENT — ENCOUNTER SYMPTOMS
VOMITING: 0
SPUTUM PRODUCTION: 1
NAUSEA: 0
TROUBLE SWALLOWING: 0
DIARRHEA: 0
CHEST TIGHTNESS: 0
FACIAL SWELLING: 0
SORE THROAT: 0
BLURRED VISION: 0
BLOOD IN STOOL: 0
HEMOPTYSIS: 0
SINUS PRESSURE: 0
BACK PAIN: 0
RHINORRHEA: 0
SHORTNESS OF BREATH: 1
ABDOMINAL PAIN: 0
ORTHOPNEA: 1
DOUBLE VISION: 0
EYE REDNESS: 0
COLOR CHANGE: 0
WHEEZING: 1
HEARTBURN: 1
EYE PAIN: 0
CONSTIPATION: 0
EYE DISCHARGE: 0
BACK PAIN: 1
COUGH: 1

## 2018-10-02 ASSESSMENT — PAIN SCALES - GENERAL
PAINLEVEL_OUTOF10: 8

## 2018-10-02 ASSESSMENT — PAIN DESCRIPTION - LOCATION
LOCATION: BACK;LEG
LOCATION: BACK

## 2018-10-02 ASSESSMENT — PAIN DESCRIPTION - FREQUENCY: FREQUENCY: CONTINUOUS

## 2018-10-02 NOTE — PROGRESS NOTES
Med Reconciliation Completed    New Medications: None    Medications Discontinued: ammonium lactate, gabapentin, miconazole, mupirocin, nystatin, pravastatin,     Changes to Dosing: Humulin R, bumetanide    Stated Allergies: Lorazepam, NSAIDS, levofloxacin    Other pertinent information: The above dosing changes were changed in the notes section to reflect how the patient takes the medication at home. Patient states he is a daily smoker of marijuana. Called to verify the medications with [facility]. OARRS history was verified.      Thank you,  Eugenie Nava PharmD Candidate [id number]

## 2018-10-02 NOTE — ED PROVIDER NOTES
[address]  eMERGENCY dEPARTMENT eNCOUnter      Pt Name: Lord Menezes. MRN: [id number]  Birthdate 1965  Date of evaluation: 10/2/18      CHIEF COMPLAINT       Chief Complaint   Patient presents with    Shortness of Breath    Cough    Chest Pain         HISTORY OF PRESENT ILLNESS    Lord Madera is a 47 y.o. male who presents complaining of Shortness of breath. Patient states that for the last 4 days she's been feeling sick. Patient states that he's had a cough productive of a yellow dark sputum. Patient states she started to have pain along the lower rib cage bilaterally because of this. Patient is been very short of breath. Patient states he feels like he may have pneumonia. Patient denies fevers but has had some chills. Patient does state he has COPD and states that he has not been able to smoke since this started. Patient does have oxygen at home and a nebulizer which has not been helping. Patient has chronic swelling and discoloration of lower extremities and states this is not really any different. REVIEW OF SYSTEMS       Review of Systems   Constitutional: Positive for chills. Negative for activity change, appetite change, diaphoresis and fever. HENT: Negative for congestion, ear pain, facial swelling, nosebleeds, rhinorrhea, sinus pressure, sore throat and trouble swallowing. Eyes: Negative for pain, discharge and redness. Respiratory: Positive for cough, shortness of breath and wheezing. Negative for chest tightness. Cardiovascular: Positive for chest pain. Negative for palpitations and leg swelling. Gastrointestinal: Negative for abdominal pain, blood in stool, constipation, diarrhea, nausea and vomiting. Genitourinary: Negative for difficulty urinating, dysuria, flank pain, frequency, genital sores and hematuria. Musculoskeletal: Negative for arthralgias, back pain, gait problem, joint swelling, myalgias and neck pain.    Skin: Negative for color Segments: nonspecific changes  T Waves: non specific changes  Q Waves: none    EKG  Impression: normal sinus rhythm, nonspecific ST and T waves changes. RADIOLOGY:All plain film, CT, MRI, and formal ultrasound images (except ED bedside ultrasound) are read by the radiologist and interpretations are directly viewed by the emergency physician. Xr Chest Portable    Result Date: 10/2/2018  EXAMINATION: SINGLE XRAY VIEW OF THE CHEST 10/2/2018 2:40 pm COMPARISON: 03/27/2018 HISTORY: Ordering Physician Provided Reason for Exam: shortness of breath. 24 hours. The patient is receiving a breathing treatment. Acuity: Acute Type of Exam: Initial FINDINGS: The lungs are without acute focal process. There is no effusion or pneumothorax. The cardiomediastinal silhouette is stable. The osseous structures are grossly intact without acute process. Stable chest without acute process. LABS: All lab results were reviewed by myself, and all abnormals are listed below.   Labs Reviewed   BASIC METABOLIC PANEL - Abnormal; Notable for the following:        Result Value    Glucose 139 (*)     BUN 21 (*)     CREATININE 1.27 (*)     GFR Non- 59 (*)     All other components within normal limits   CBC WITH AUTO DIFFERENTIAL - Abnormal; Notable for the following:     RBC 4.30 (*)     Hemoglobin 12.0 (*)     Hematocrit 36.1 (*)     RDW 15.6 (*)     Seg Neutrophils 74 (*)     Lymphocytes 13 (*)     Monocytes 8 (*)     All other components within normal limits   BLOOD GAS, VENOUS - Abnormal; Notable for the following:     pO2, Eleno 87.8 (*)     HCO3, Venous 30.2 (*)     Positive Base Excess, Eleno 5.3 (*)     O2 Sat, Eleno 92.1 (*)     All other components within normal limits   TROP/MYOGLOBIN   TROP/MYOGLOBIN         EMERGENCY DEPARTMENT COURSE:   Vitals:    Vitals:    10/02/18 1433 10/02/18 1445 10/02/18 1450 10/02/18 1515   BP: (!) 184/36 (!) 163/40  135/62   Pulse: 82 76  75   Resp: 23 22 23 12   Temp:    98.2

## 2018-10-02 NOTE — H&P
Surgical History:   Procedure Laterality Date    BACK SURGERY   (x 4) 2000,.12/2011.2/2012     Dr Prema Pope last 2 surg    CARDIAC CATHETERIZATION  04/23/2018    no stenting    COLONOSCOPY  11/3/2015    hemorrhoids, poor prep    CORONARY ANGIOPLASTY WITH STENT PLACEMENT  March 2013    x 1    HAND TENDON SURGERY Left     thumb tendon repair    KNEE ARTHROSCOPY Left     NERVE BLOCK  07-31-15    TENS unit    AL ESOPHAGOGASTRODUODENOSCOPY TRANSORAL DIAGNOSTIC N/A 7/18/2018    EGD ESOPHAGOGASTRODUODENOSCOPY performed by Digna Kowalski MD at [facility] Bilateral 09/20/2012    Dr Pearl Hector  4/13/2013    normal        Medications Prior to Admission:     Prior to Admission medications    Medication Sig Start Date End Date Taking? Authorizing Provider   benzonatate (TESSALON PERLES) 100 MG capsule Take 1 capsule by mouth 3 times daily as needed for Cough 9/28/18 10/5/18  Diallo Moyer MD   oxyCODONE HCl (OXY-IR) 10 MG immediate release tablet Take 1 tablet by mouth every 8 hours as needed for Pain for up to 30 days. . 9/18/18 10/18/18  Diallo Moyer MD   nystatin (MYCOSTATIN) 150580 UNIT/GM powder Apply 2- 3 times daily. 8/8/18   Diallo Moyer MD   mupirocin (BACTROBAN) 2 % ointment Apply topically 1-2 times daily on the affected area .  OK to substitute to cream 8/8/18   Diallo Moyer MD   ONE TOUCH ULTRASOFT LANCETS MISC USE ONE LANCET TO TEST THREE TIMES A DAY 7/5/18   Diallo Moyer MD   blood glucose test strips (ONE TOUCH ULTRA TEST) strip USE ONE STRIP TO TEST 3 TIMES A DAY 7/5/18   Diallo Moyer MD   nicotine (NICODERM CQ) 21 MG/24HR Place 1 patch onto the skin every 24 hours Step 1 5/30/18 5/30/19  Diallo Moyer MD   pravastatin (PRAVACHOL) 40 MG tablet Take 1 tablet by mouth every evening STOP ATORVASTATIN 5/17/18   Nai Tijerina MD   tiZANidine (ZANAFLEX) 4 MG tablet TAKE ONE TABLET BY MOUTH EVERY 8 sounds. No murmur heard. Pulmonary/Chest: Pt in respiratory distress. He has decreased breath sounds in the right lower field and the left lower field. He has expiratory wheezes. He has no rales. He exhibits no tenderness. Abdominal: Soft. Bowel sounds are normal. He exhibits no distension. There is no rebound and no guarding. Very obese abdomen   Neurological: He is alert and oriented to person, place, and time. No cranial nerve deficit. He exhibits normal muscle tone. Coordination normal.   Skin: Skin is warm and dry. Rash noted. He is not diaphoretic. Bilateral legs with dark brown stasis dermatitis.       Investigations:      Laboratory Testing:  Recent Results (from the past 24 hour(s))   EKG 12 Lead    Collection Time: 10/02/18  2:41 PM   Result Value Ref Range    Ventricular Rate 77 BPM    Atrial Rate 77 BPM    P-R Interval 146 ms    QRS Duration 116 ms    Q-T Interval 382 ms    QTc Calculation (Bazett) 432 ms    P Axis 46 degrees    R Axis -25 degrees    T Axis 73 degrees   Basic Metabolic Panel    Collection Time: 10/02/18  2:48 PM   Result Value Ref Range    Glucose 139 (H) 70 - 99 mg/dL    BUN 21 (H) 6 - 20 mg/dL    CREATININE 1.27 (H) 0.70 - 1.20 mg/dL    Bun/Cre Ratio NOT REPORTED 9 - 20    Calcium 9.2 8.6 - 10.4 mg/dL    Sodium 143 135 - 144 mmol/L    Potassium 4.2 3.7 - 5.3 mmol/L    Chloride 101 98 - 107 mmol/L    CO2 29 20 - 31 mmol/L    Anion Gap 13 9 - 17 mmol/L    GFR Non-African American 59 (L) >60 mL/min    GFR African American >60 >60 mL/min    GFR Comment          GFR Staging NOT REPORTED    CBC Auto Differential    Collection Time: 10/02/18  2:48 PM   Result Value Ref Range    WBC 7.7 3.5 - 11.0 k/uL    RBC 4.30 (L) 4.5 - 5.9 m/uL    Hemoglobin 12.0 (L) 13.5 - 17.5 g/dL    Hematocrit 36.1 (L) 41 - 53 %    MCV 84.0 80 - 100 fL    MCH 28.0 26 - 34 pg    MCHC 33.4 31 - 37 g/dL    RDW 15.6 (H) 11.5 - 14.9 %    Platelets 598 171 - 024 k/uL    MPV 9.2 6.0 - 12.0 fL    NRBC Automated NOT REPORTED per 100 WBC    Differential Type NOT REPORTED     Seg Neutrophils 74 (H) 36 - 66 %    Lymphocytes 13 (L) 24 - 44 %    Monocytes 8 (H) 1 - 7 %    Eosinophils % 4 0 - 4 %    Basophils 1 0 - 2 %    Immature Granulocytes NOT REPORTED 0 %    Segs Absolute 5.70 1.3 - 9.1 k/uL    Absolute Lymph # 1.00 1.0 - 4.8 k/uL    Absolute Mono # 0.60 0.1 - 1.3 k/uL    Absolute Eos # 0.30 0.0 - 0.4 k/uL    Basophils # 0.10 0.0 - 0.2 k/uL    Absolute Immature Granulocyte NOT REPORTED 0.00 - 0.30 k/uL    WBC Morphology NOT REPORTED     RBC Morphology NOT REPORTED     Platelet Estimate NOT REPORTED    TROP/MYOGLOBIN    Collection Time: 10/02/18  2:48 PM   Result Value Ref Range    Troponin T <0.03 <0.03 ng/mL    Troponin Interp          Myoglobin 95 (H) 28 - 72 ng/mL   Blood Gas, Venous    Collection Time: 10/02/18  3:10 PM   Result Value Ref Range    pH, Eleno 7.394 7.320 - 7.420    pCO2, Eleno 49.5 39.0 - 55.0    pO2, Eleno 87.8 (H) 30.0 - 50.0    HCO3, Venous 30.2 (H) 24.0 - 30.0 mmol/L    Positive Base Excess, Eleno 5.3 (H) 0.0 - 2.0 mmol/L    Negative Base Excess, Eleno NOT REPORTED 0.0 - 2.0 mmol/L    O2 Sat, Eleno 92.1 (H) 60.0 - 85.0 %    Total Hb NOT REPORTED 12.0 - 16.0 g/dl    Oxyhemoglobin NOT REPORTED 95.0 - 98.0 %    Carboxyhemoglobin 4.6 0 - 5 %    Methemoglobin 0.6 0.0 - 1.9 %    Pt Temp 37.0     pH, Eleno, Temp Adj NOT REPORTED 7.320 - 7.420    pCO2, Eleno, Temp Adj NOT REPORTED 39.0 - 55.0 mmHg    pO2, Eleno, Temp Adj NOT REPORTED 30.0 - 50.0 mmHg    O2 Device/Flow/% NOT REPORTED     Respiratory Rate NOT REPORTED     Dimitri Test NOT REPORTED     Sample Site NOT REPORTED     Pt.  Position NOT REPORTED     Mode NOT REPORTED     Set Rate NOT REPORTED     Total Rate NOT REPORTED     VT NOT REPORTED     FIO2 NOT REPORTED     Peep/Cpap NOT REPORTED     PSV NOT REPORTED     Text for Respiratory DRAWN PER RN RESULTS TO DR. Sherry Antoine     NOTIFICATION NOT REPORTED     NOTIFICATION TIME NOT REPORTED        Imaging/Diagnostics:  Xr Chest

## 2018-10-03 VITALS
BODY MASS INDEX: 42.66 KG/M2 | WEIGHT: 315 LBS | TEMPERATURE: 97.9 F | DIASTOLIC BLOOD PRESSURE: 58 MMHG | RESPIRATION RATE: 20 BRPM | OXYGEN SATURATION: 98 % | SYSTOLIC BLOOD PRESSURE: 119 MMHG | HEIGHT: 72 IN | HEART RATE: 63 BPM

## 2018-10-03 LAB
ANION GAP SERPL CALCULATED.3IONS-SCNC: 13 MMOL/L (ref 9–17)
BUN BLDV-MCNC: 26 MG/DL (ref 6–20)
BUN/CREAT BLD: ABNORMAL (ref 9–20)
CALCIUM SERPL-MCNC: 9.4 MG/DL (ref 8.6–10.4)
CHLORIDE BLD-SCNC: 98 MMOL/L (ref 98–107)
CO2: 26 MMOL/L (ref 20–31)
CREAT SERPL-MCNC: 1.41 MG/DL (ref 0.7–1.2)
GFR AFRICAN AMERICAN: >60 ML/MIN
GFR NON-AFRICAN AMERICAN: 53 ML/MIN
GFR SERPL CREATININE-BSD FRML MDRD: ABNORMAL ML/MIN/{1.73_M2}
GFR SERPL CREATININE-BSD FRML MDRD: ABNORMAL ML/MIN/{1.73_M2}
GLUCOSE BLD-MCNC: 213 MG/DL (ref 75–110)
GLUCOSE BLD-MCNC: 241 MG/DL (ref 70–99)
GLUCOSE BLD-MCNC: 251 MG/DL (ref 75–110)
POTASSIUM SERPL-SCNC: 4.5 MMOL/L (ref 3.7–5.3)
SODIUM BLD-SCNC: 137 MMOL/L (ref 135–144)

## 2018-10-03 PROCEDURE — 96372 THER/PROPH/DIAG INJ SC/IM: CPT

## 2018-10-03 PROCEDURE — 99217 PR OBSERVATION CARE DISCHARGE MANAGEMENT: CPT | Performed by: INTERNAL MEDICINE

## 2018-10-03 PROCEDURE — 6370000000 HC RX 637 (ALT 250 FOR IP): Performed by: STUDENT IN AN ORGANIZED HEALTH CARE EDUCATION/TRAINING PROGRAM

## 2018-10-03 PROCEDURE — G0378 HOSPITAL OBSERVATION PER HR: HCPCS

## 2018-10-03 PROCEDURE — 94640 AIRWAY INHALATION TREATMENT: CPT

## 2018-10-03 PROCEDURE — 6360000002 HC RX W HCPCS: Performed by: STUDENT IN AN ORGANIZED HEALTH CARE EDUCATION/TRAINING PROGRAM

## 2018-10-03 PROCEDURE — 2580000003 HC RX 258: Performed by: STUDENT IN AN ORGANIZED HEALTH CARE EDUCATION/TRAINING PROGRAM

## 2018-10-03 PROCEDURE — 36415 COLL VENOUS BLD VENIPUNCTURE: CPT

## 2018-10-03 PROCEDURE — 82947 ASSAY GLUCOSE BLOOD QUANT: CPT

## 2018-10-03 PROCEDURE — 80048 BASIC METABOLIC PNL TOTAL CA: CPT

## 2018-10-03 RX ORDER — AZITHROMYCIN 250 MG/1
TABLET, FILM COATED ORAL
Qty: 1 PACKET | Refills: 0 | Status: SHIPPED | OUTPATIENT
Start: 2018-10-03 | End: 2018-10-07

## 2018-10-03 RX ORDER — PREDNISONE 20 MG/1
40 TABLET ORAL DAILY
Qty: 10 TABLET | Refills: 0 | Status: SHIPPED | OUTPATIENT
Start: 2018-10-03 | End: 2018-10-08

## 2018-10-03 RX ORDER — PREDNISONE 20 MG/1
20 TABLET ORAL DAILY
Qty: 5 TABLET | Refills: 0 | Status: SHIPPED | OUTPATIENT
Start: 2018-10-09 | End: 2018-10-14

## 2018-10-03 RX ADMIN — METOPROLOL TARTRATE 50 MG: 50 TABLET, FILM COATED ORAL at 07:55

## 2018-10-03 RX ADMIN — HEPARIN SODIUM 5000 UNITS: 5000 INJECTION, SOLUTION INTRAVENOUS; SUBCUTANEOUS at 05:37

## 2018-10-03 RX ADMIN — BUMETANIDE 3 MG: 1 TABLET ORAL at 07:55

## 2018-10-03 RX ADMIN — CLOPIDOGREL BISULFATE 75 MG: 75 TABLET ORAL at 07:54

## 2018-10-03 RX ADMIN — INSULIN HUMAN 200 UNITS: 500 INJECTION, SOLUTION SUBCUTANEOUS at 07:59

## 2018-10-03 RX ADMIN — Medication 10 ML: at 09:28

## 2018-10-03 RX ADMIN — LISINOPRIL 5 MG: 5 TABLET ORAL at 07:55

## 2018-10-03 RX ADMIN — DOXYCYCLINE 100 MG: 100 CAPSULE ORAL at 07:53

## 2018-10-03 RX ADMIN — ALBUTEROL SULFATE 2.5 MG: 2.5 SOLUTION RESPIRATORY (INHALATION) at 11:35

## 2018-10-03 RX ADMIN — PREDNISONE 40 MG: 20 TABLET ORAL at 07:53

## 2018-10-03 RX ADMIN — VENLAFAXINE 75 MG: 75 TABLET ORAL at 07:54

## 2018-10-03 RX ADMIN — INSULIN HUMAN 200 UNITS: 500 INJECTION, SOLUTION SUBCUTANEOUS at 11:44

## 2018-10-03 RX ADMIN — ASPIRIN 81 MG: 81 TABLET, COATED ORAL at 08:10

## 2018-10-04 ENCOUNTER — HOSPITAL ENCOUNTER (OUTPATIENT)
Age: 53
Discharge: HOME | End: 2018-10-04
Payer: COMMERCIAL

## 2018-10-04 DIAGNOSIS — I12.9 BENIGN HYPERTENSION WITH CKD (CHRONIC KIDNEY DISEASE), STAGE II: ICD-10-CM

## 2018-10-04 DIAGNOSIS — E13.22 SECONDARY DIABETES MELLITUS WITH STAGE 2 CHRONIC KIDNEY DISEASE (HCC): ICD-10-CM

## 2018-10-04 DIAGNOSIS — N18.2 CKD (CHRONIC KIDNEY DISEASE) STAGE 2, GFR 60-89 ML/MIN: ICD-10-CM

## 2018-10-04 DIAGNOSIS — N18.2 SECONDARY DIABETES MELLITUS WITH STAGE 2 CHRONIC KIDNEY DISEASE (HCC): ICD-10-CM

## 2018-10-04 DIAGNOSIS — N18.2 BENIGN HYPERTENSION WITH CKD (CHRONIC KIDNEY DISEASE), STAGE II: ICD-10-CM

## 2018-10-04 LAB
ABSOLUTE EOS #: 0.2 K/UL (ref 0–0.4)
ABSOLUTE IMMATURE GRANULOCYTE: ABNORMAL K/UL (ref 0–0.3)
ABSOLUTE LYMPH #: 1.1 K/UL (ref 1–4.8)
ABSOLUTE MONO #: 0.5 K/UL (ref 0.1–1.3)
ALBUMIN SERPL-MCNC: 3.7 G/DL (ref 3.5–5.2)
ALBUMIN/GLOBULIN RATIO: ABNORMAL (ref 1–2.5)
ALP BLD-CCNC: 71 U/L (ref 40–129)
ALT SERPL-CCNC: 24 U/L (ref 5–41)
ANION GAP SERPL CALCULATED.3IONS-SCNC: 16 MMOL/L (ref 9–17)
AST SERPL-CCNC: 19 U/L
BASOPHILS # BLD: 1 % (ref 0–2)
BASOPHILS ABSOLUTE: 0.1 K/UL (ref 0–0.2)
BILIRUB SERPL-MCNC: 0.48 MG/DL (ref 0.3–1.2)
BUN BLDV-MCNC: 34 MG/DL (ref 6–20)
BUN/CREAT BLD: ABNORMAL (ref 9–20)
CALCIUM SERPL-MCNC: 8.8 MG/DL (ref 8.6–10.4)
CHLORIDE BLD-SCNC: 100 MMOL/L (ref 98–107)
CO2: 25 MMOL/L (ref 20–31)
CREAT SERPL-MCNC: 1.68 MG/DL (ref 0.7–1.2)
DIFFERENTIAL TYPE: ABNORMAL
EOSINOPHILS RELATIVE PERCENT: 2 % (ref 0–4)
GFR AFRICAN AMERICAN: 52 ML/MIN
GFR NON-AFRICAN AMERICAN: 43 ML/MIN
GFR SERPL CREATININE-BSD FRML MDRD: ABNORMAL ML/MIN/{1.73_M2}
GFR SERPL CREATININE-BSD FRML MDRD: ABNORMAL ML/MIN/{1.73_M2}
GLUCOSE BLD-MCNC: 315 MG/DL (ref 70–99)
HCT VFR BLD CALC: 39.3 % (ref 41–53)
HEMOGLOBIN: 12.7 G/DL (ref 13.5–17.5)
IMMATURE GRANULOCYTES: ABNORMAL %
LYMPHOCYTES # BLD: 14 % (ref 24–44)
MAGNESIUM: 1.7 MG/DL (ref 1.6–2.6)
MCH RBC QN AUTO: 27.6 PG (ref 26–34)
MCHC RBC AUTO-ENTMCNC: 32.3 G/DL (ref 31–37)
MCV RBC AUTO: 85.7 FL (ref 80–100)
MONOCYTES # BLD: 7 % (ref 1–7)
NRBC AUTOMATED: ABNORMAL PER 100 WBC
PDW BLD-RTO: 15.9 % (ref 11.5–14.9)
PHOSPHORUS: 4 MG/DL (ref 2.5–4.5)
PLATELET # BLD: 171 K/UL (ref 150–450)
PLATELET ESTIMATE: ABNORMAL
PMV BLD AUTO: 9.1 FL (ref 6–12)
POTASSIUM SERPL-SCNC: 4.3 MMOL/L (ref 3.7–5.3)
RBC # BLD: 4.58 M/UL (ref 4.5–5.9)
RBC # BLD: ABNORMAL 10*6/UL
SEG NEUTROPHILS: 76 % (ref 36–66)
SEGMENTED NEUTROPHILS ABSOLUTE COUNT: 6 K/UL (ref 1.3–9.1)
SODIUM BLD-SCNC: 141 MMOL/L (ref 135–144)
TOTAL PROTEIN: 6.8 G/DL (ref 6.4–8.3)
WBC # BLD: 7.8 K/UL (ref 3.5–11)
WBC # BLD: ABNORMAL 10*3/UL

## 2018-10-04 PROCEDURE — 36415 COLL VENOUS BLD VENIPUNCTURE: CPT

## 2018-10-04 PROCEDURE — 85025 COMPLETE CBC W/AUTO DIFF WBC: CPT

## 2018-10-04 PROCEDURE — 80053 COMPREHEN METABOLIC PANEL: CPT

## 2018-10-04 PROCEDURE — 83735 ASSAY OF MAGNESIUM: CPT

## 2018-10-04 PROCEDURE — 84100 ASSAY OF PHOSPHORUS: CPT

## 2018-10-05 DIAGNOSIS — J44.9 CHRONIC OBSTRUCTIVE PULMONARY DISEASE, UNSPECIFIED COPD TYPE (HCC): ICD-10-CM

## 2018-10-05 RX ORDER — FLUTICASONE PROPIONATE AND SALMETEROL XINAFOATE 230; 21 UG/1; UG/1
AEROSOL, METERED RESPIRATORY (INHALATION)
Qty: 12 G | Refills: 10 | Status: SHIPPED | OUTPATIENT
Start: 2018-10-05 | End: 2018-10-08 | Stop reason: SDUPTHER

## 2018-10-08 ENCOUNTER — PATIENT MESSAGE (OUTPATIENT)
Dept: FAMILY MEDICINE CLINIC | Age: 53
End: 2018-10-08

## 2018-10-08 DIAGNOSIS — M48.062 SPINAL STENOSIS OF LUMBAR REGION WITH NEUROGENIC CLAUDICATION: Primary | ICD-10-CM

## 2018-10-08 DIAGNOSIS — J44.9 CHRONIC OBSTRUCTIVE PULMONARY DISEASE, UNSPECIFIED COPD TYPE (HCC): ICD-10-CM

## 2018-10-10 ENCOUNTER — HOSPITAL ENCOUNTER (OUTPATIENT)
Dept: GENERAL RADIOLOGY | Age: 53
Discharge: HOME | End: 2018-10-12
Payer: COMMERCIAL

## 2018-10-10 ENCOUNTER — HOSPITAL ENCOUNTER (OUTPATIENT)
Dept: GENERAL RADIOLOGY | Age: 53
End: 2018-10-10
Payer: COMMERCIAL

## 2018-10-10 ENCOUNTER — HOSPITAL ENCOUNTER (OUTPATIENT)
Age: 53
Discharge: HOME | End: 2018-10-12
Payer: COMMERCIAL

## 2018-10-10 DIAGNOSIS — M48.062 SPINAL STENOSIS OF LUMBAR REGION WITH NEUROGENIC CLAUDICATION: ICD-10-CM

## 2018-10-10 PROCEDURE — 72100 X-RAY EXAM L-S SPINE 2/3 VWS: CPT

## 2018-10-11 DIAGNOSIS — R60.0 BILATERAL LEG EDEMA: Primary | ICD-10-CM

## 2018-10-11 DIAGNOSIS — I87.2 VENOUS INSUFFICIENCY OF BOTH LOWER EXTREMITIES: ICD-10-CM

## 2018-10-17 ENCOUNTER — PATIENT MESSAGE (OUTPATIENT)
Dept: FAMILY MEDICINE CLINIC | Age: 53
End: 2018-10-17

## 2018-10-17 ENCOUNTER — HOSPITAL ENCOUNTER (OUTPATIENT)
Dept: VASCULAR LAB | Age: 53
Discharge: HOME | End: 2018-10-17
Payer: COMMERCIAL

## 2018-10-17 DIAGNOSIS — G47.01 INSOMNIA DUE TO MEDICAL CONDITION: ICD-10-CM

## 2018-10-17 DIAGNOSIS — G89.29 CHRONIC MIDLINE LOW BACK PAIN WITH LEFT-SIDED SCIATICA: ICD-10-CM

## 2018-10-17 DIAGNOSIS — I50.32 CHRONIC DIASTOLIC CONGESTIVE HEART FAILURE (HCC): ICD-10-CM

## 2018-10-17 DIAGNOSIS — M54.42 CHRONIC MIDLINE LOW BACK PAIN WITH LEFT-SIDED SCIATICA: ICD-10-CM

## 2018-10-17 PROCEDURE — 93970 EXTREMITY STUDY: CPT

## 2018-10-17 RX ORDER — OXYCODONE HYDROCHLORIDE 10 MG/1
10 TABLET ORAL EVERY 8 HOURS PRN
Qty: 90 TABLET | Refills: 0 | Status: SHIPPED | OUTPATIENT
Start: 2018-10-17 | End: 2018-11-15 | Stop reason: SDUPTHER

## 2018-10-17 NOTE — TELEPHONE ENCOUNTER
From: Ori Dunn. To: Sandra Gallagher MD  Sent: 10/17/2018 11:33 AM EDT  Subject: Non-Urgent Medical Question    Can you please Refill my pain meds.   I will be out of them on Saturday   I take Oxycodone 10 Mg 1 Tablet every 8 Hours thanks and have a great day

## 2018-10-22 ENCOUNTER — HOSPITAL ENCOUNTER (OUTPATIENT)
Dept: OTHER | Age: 53
Discharge: HOME | End: 2018-10-22
Payer: COMMERCIAL

## 2018-10-22 VITALS
HEART RATE: 85 BPM | SYSTOLIC BLOOD PRESSURE: 146 MMHG | WEIGHT: 315 LBS | DIASTOLIC BLOOD PRESSURE: 76 MMHG | OXYGEN SATURATION: 94 % | RESPIRATION RATE: 20 BRPM | BODY MASS INDEX: 52.55 KG/M2

## 2018-10-22 PROCEDURE — 99211 OFF/OP EST MAY X REQ PHY/QHP: CPT

## 2018-10-23 ENCOUNTER — OFFICE VISIT (OUTPATIENT)
Dept: FAMILY MEDICINE CLINIC | Age: 53
End: 2018-10-23
Payer: COMMERCIAL

## 2018-10-23 VITALS
OXYGEN SATURATION: 97 % | WEIGHT: 315 LBS | BODY MASS INDEX: 42.66 KG/M2 | DIASTOLIC BLOOD PRESSURE: 76 MMHG | SYSTOLIC BLOOD PRESSURE: 154 MMHG | HEART RATE: 76 BPM | HEIGHT: 72 IN

## 2018-10-23 DIAGNOSIS — F33.1 MODERATE EPISODE OF RECURRENT MAJOR DEPRESSIVE DISORDER (HCC): ICD-10-CM

## 2018-10-23 DIAGNOSIS — Z79.4 TYPE 2 DIABETES MELLITUS WITH DIABETIC NEUROPATHY, WITH LONG-TERM CURRENT USE OF INSULIN (HCC): ICD-10-CM

## 2018-10-23 DIAGNOSIS — E78.5 HYPERLIPIDEMIA WITH TARGET LDL LESS THAN 70: ICD-10-CM

## 2018-10-23 DIAGNOSIS — G47.01 INSOMNIA DUE TO MEDICAL CONDITION: ICD-10-CM

## 2018-10-23 DIAGNOSIS — G89.29 CHRONIC BACK PAIN GREATER THAN 3 MONTHS DURATION: ICD-10-CM

## 2018-10-23 DIAGNOSIS — I50.32 CHRONIC DIASTOLIC CONGESTIVE HEART FAILURE (HCC): ICD-10-CM

## 2018-10-23 DIAGNOSIS — M54.9 CHRONIC BACK PAIN GREATER THAN 3 MONTHS DURATION: ICD-10-CM

## 2018-10-23 DIAGNOSIS — E11.40 TYPE 2 DIABETES MELLITUS WITH DIABETIC NEUROPATHY, WITH LONG-TERM CURRENT USE OF INSULIN (HCC): ICD-10-CM

## 2018-10-23 DIAGNOSIS — D64.9 ANEMIA, UNSPECIFIED TYPE: ICD-10-CM

## 2018-10-23 DIAGNOSIS — N17.9 AKI (ACUTE KIDNEY INJURY) (HCC): ICD-10-CM

## 2018-10-23 DIAGNOSIS — J44.1 COPD EXACERBATION (HCC): Primary | ICD-10-CM

## 2018-10-23 DIAGNOSIS — F41.9 ANXIETY: ICD-10-CM

## 2018-10-23 PROCEDURE — 96160 PT-FOCUSED HLTH RISK ASSMT: CPT | Performed by: FAMILY MEDICINE

## 2018-10-23 PROCEDURE — G8417 CALC BMI ABV UP PARAM F/U: HCPCS | Performed by: FAMILY MEDICINE

## 2018-10-23 PROCEDURE — 3017F COLORECTAL CA SCREEN DOC REV: CPT | Performed by: FAMILY MEDICINE

## 2018-10-23 PROCEDURE — 99215 OFFICE O/P EST HI 40 MIN: CPT | Performed by: FAMILY MEDICINE

## 2018-10-23 PROCEDURE — 1111F DSCHRG MED/CURRENT MED MERGE: CPT | Performed by: FAMILY MEDICINE

## 2018-10-23 PROCEDURE — 3023F SPIROM DOC REV: CPT | Performed by: FAMILY MEDICINE

## 2018-10-23 PROCEDURE — 3045F PR MOST RECENT HEMOGLOBIN A1C LEVEL 7.0-9.0%: CPT | Performed by: FAMILY MEDICINE

## 2018-10-23 PROCEDURE — G8926 SPIRO NO PERF OR DOC: HCPCS | Performed by: FAMILY MEDICINE

## 2018-10-23 PROCEDURE — 1036F TOBACCO NON-USER: CPT | Performed by: FAMILY MEDICINE

## 2018-10-23 PROCEDURE — G8427 DOCREV CUR MEDS BY ELIG CLIN: HCPCS | Performed by: FAMILY MEDICINE

## 2018-10-23 PROCEDURE — G8598 ASA/ANTIPLAT THER USED: HCPCS | Performed by: FAMILY MEDICINE

## 2018-10-23 PROCEDURE — G8482 FLU IMMUNIZE ORDER/ADMIN: HCPCS | Performed by: FAMILY MEDICINE

## 2018-10-23 PROCEDURE — 2022F DILAT RTA XM EVC RTNOPTHY: CPT | Performed by: FAMILY MEDICINE

## 2018-10-23 RX ORDER — PRAVASTATIN SODIUM 40 MG
40 TABLET ORAL NIGHTLY
Qty: 90 TABLET | Refills: 0 | Status: SHIPPED | OUTPATIENT
Start: 2018-10-23 | End: 2019-05-22 | Stop reason: SDUPTHER

## 2018-10-23 RX ORDER — ALBUTEROL SULFATE 2.5 MG/3ML
2.5 SOLUTION RESPIRATORY (INHALATION) EVERY 6 HOURS PRN
Qty: 120 VIAL | Refills: 0 | Status: SHIPPED | OUTPATIENT
Start: 2018-10-23 | End: 2019-06-04 | Stop reason: SDUPTHER

## 2018-10-23 RX ORDER — VENLAFAXINE 100 MG/1
100 TABLET ORAL 2 TIMES DAILY
Qty: 60 TABLET | Refills: 3 | Status: SHIPPED | OUTPATIENT
Start: 2018-10-23 | End: 2019-02-18 | Stop reason: SDUPTHER

## 2018-10-23 ASSESSMENT — ENCOUNTER SYMPTOMS
NAUSEA: 0
CONSTIPATION: 0
DIARRHEA: 0
CHEST TIGHTNESS: 0
COUGH: 1
ABDOMINAL DISTENTION: 0
VOMITING: 0
WHEEZING: 0
SHORTNESS OF BREATH: 1
BACK PAIN: 1
APNEA: 1
ABDOMINAL PAIN: 1

## 2018-10-23 ASSESSMENT — PATIENT HEALTH QUESTIONNAIRE - PHQ9
1. LITTLE INTEREST OR PLEASURE IN DOING THINGS: 3
SUM OF ALL RESPONSES TO PHQ9 QUESTIONS 1 & 2: 6
SUM OF ALL RESPONSES TO PHQ QUESTIONS 1-9: 11
SUM OF ALL RESPONSES TO PHQ QUESTIONS 1-9: 11
8. MOVING OR SPEAKING SO SLOWLY THAT OTHER PEOPLE COULD HAVE NOTICED. OR THE OPPOSITE, BEING SO FIGETY OR RESTLESS THAT YOU HAVE BEEN MOVING AROUND A LOT MORE THAN USUAL: 2
3. TROUBLE FALLING OR STAYING ASLEEP: 0
2. FEELING DOWN, DEPRESSED OR HOPELESS: 3
5. POOR APPETITE OR OVEREATING: 0
6. FEELING BAD ABOUT YOURSELF - OR THAT YOU ARE A FAILURE OR HAVE LET YOURSELF OR YOUR FAMILY DOWN: 1
4. FEELING TIRED OR HAVING LITTLE ENERGY: 1
7. TROUBLE CONCENTRATING ON THINGS, SUCH AS READING THE NEWSPAPER OR WATCHING TELEVISION: 1
9. THOUGHTS THAT YOU WOULD BE BETTER OFF DEAD, OR OF HURTING YOURSELF: 0
10. IF YOU CHECKED OFF ANY PROBLEMS, HOW DIFFICULT HAVE THESE PROBLEMS MADE IT FOR YOU TO DO YOUR WORK, TAKE CARE OF THINGS AT HOME, OR GET ALONG WITH OTHER PEOPLE: 1

## 2018-10-23 NOTE — TELEPHONE ENCOUNTER
Your addend  progress note was never received by [facility] , I will be faxed it today
Denies hx of a corneal abrasion or use of daily eye drops

## 2018-10-23 NOTE — PROGRESS NOTES
monitoring  10/04/2019    DTaP/Tdap/Td vaccine (2 - Td) 09/12/2028    Flu vaccine  Completed    Pneumococcal med risk  Completed    Hepatitis C screen  Completed    HIV screen  Completed

## 2018-10-23 NOTE — LETTER
MEDICATION AGREEMENT     Miguel Garcia.  [id number]      For certain conditions, multiple classes of medications may be used to help better manage your symptoms, and to improve your ability to function at home, work and in social settings. However, these medications do have risks, which will be discussed with you, including addiction and dependency. The following prescribed medications need frequent monitoring and will require you to partner and assist in your healthcare. Medication  Dose, instructions and quantity as indicated on current prescription bottle Diagnosis/Reason(s) for Taking Category   oxyCODONE HCl (OXY-IR) 10 MG immediate release tablet Take 1 tablet by mouth every 8 hours as needed for Pain for up to 30 days. . 90 tablet      chronic back pain  opioid                            Benefits and goals of Controlled Substance Medications: There are two potential goals for your treatment: (1) decreased pain and suffering (2) improved daily life functions. There are many possible treatments for your chronic condition(s), and, in addition to controlled substance medications, we will try alternatives such as physical therapy, yoga, massage, home daily exercise, meditation, relaxation techniques, injections, chiropractic manipulations, surgery, cognitive therapy, hypnosis and many medications that are not habit-forming. Use of controlled substance medications may be helpful, but they are unlikely to resolve all of your symptoms or restore all function. Risks of Controlled Substance Medications:    Opioid pain medications: These medications can lead to problems such as addiction/dependence, sedation, lightheadedness/dizziness, memory issues, falls, constipation, nausea, or vomiting. They may also impair the ability to drive or operate machinery.   Additionally, these medications may lower testosterone levels, leading to loss of bone strength, stamina
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
## 2018-10-28 PROBLEM — H26.9 NUCLEAR NONSENILE CATARACT: Status: ACTIVE | Noted: 2017-03-08

## 2018-10-29 ENCOUNTER — HOSPITAL ENCOUNTER (OUTPATIENT)
Age: 53
Discharge: HOME | End: 2018-10-29
Payer: COMMERCIAL

## 2018-10-29 ENCOUNTER — OFFICE VISIT (OUTPATIENT)
Dept: VASCULAR SURGERY | Age: 53
End: 2018-10-29
Payer: COMMERCIAL

## 2018-10-29 VITALS
OXYGEN SATURATION: 98 % | BODY MASS INDEX: 42.66 KG/M2 | HEART RATE: 86 BPM | WEIGHT: 315 LBS | RESPIRATION RATE: 22 BRPM | SYSTOLIC BLOOD PRESSURE: 138 MMHG | DIASTOLIC BLOOD PRESSURE: 86 MMHG | HEIGHT: 72 IN

## 2018-10-29 DIAGNOSIS — M79.606 PAIN AND SWELLING OF LOWER EXTREMITY, UNSPECIFIED LATERALITY: Primary | ICD-10-CM

## 2018-10-29 DIAGNOSIS — N17.9 AKI (ACUTE KIDNEY INJURY) (HCC): ICD-10-CM

## 2018-10-29 DIAGNOSIS — M79.89 PAIN AND SWELLING OF LOWER EXTREMITY, UNSPECIFIED LATERALITY: Primary | ICD-10-CM

## 2018-10-29 LAB
ANION GAP SERPL CALCULATED.3IONS-SCNC: 14 MMOL/L (ref 9–17)
BUN BLDV-MCNC: 25 MG/DL (ref 6–20)
BUN/CREAT BLD: ABNORMAL (ref 9–20)
CALCIUM SERPL-MCNC: 9.5 MG/DL (ref 8.6–10.4)
CHLORIDE BLD-SCNC: 100 MMOL/L (ref 98–107)
CO2: 26 MMOL/L (ref 20–31)
CREAT SERPL-MCNC: 1.55 MG/DL (ref 0.7–1.2)
GFR AFRICAN AMERICAN: 57 ML/MIN
GFR NON-AFRICAN AMERICAN: 47 ML/MIN
GFR SERPL CREATININE-BSD FRML MDRD: ABNORMAL ML/MIN/{1.73_M2}
GFR SERPL CREATININE-BSD FRML MDRD: ABNORMAL ML/MIN/{1.73_M2}
GLUCOSE BLD-MCNC: 234 MG/DL (ref 70–99)
POTASSIUM SERPL-SCNC: 4.6 MMOL/L (ref 3.7–5.3)
SODIUM BLD-SCNC: 140 MMOL/L (ref 135–144)

## 2018-10-29 PROCEDURE — G8482 FLU IMMUNIZE ORDER/ADMIN: HCPCS | Performed by: SURGERY

## 2018-10-29 PROCEDURE — 99213 OFFICE O/P EST LOW 20 MIN: CPT | Performed by: SURGERY

## 2018-10-29 PROCEDURE — G8427 DOCREV CUR MEDS BY ELIG CLIN: HCPCS | Performed by: SURGERY

## 2018-10-29 PROCEDURE — 80048 BASIC METABOLIC PNL TOTAL CA: CPT

## 2018-10-29 PROCEDURE — G8417 CALC BMI ABV UP PARAM F/U: HCPCS | Performed by: SURGERY

## 2018-10-29 PROCEDURE — 36415 COLL VENOUS BLD VENIPUNCTURE: CPT

## 2018-10-29 PROCEDURE — 3017F COLORECTAL CA SCREEN DOC REV: CPT | Performed by: SURGERY

## 2018-10-30 ASSESSMENT — ENCOUNTER SYMPTOMS
ABDOMINAL PAIN: 0
ALLERGIC/IMMUNOLOGIC NEGATIVE: 1
BACK PAIN: 1
CHEST TIGHTNESS: 0
SHORTNESS OF BREATH: 1
COLOR CHANGE: 1

## 2018-10-30 NOTE — PROGRESS NOTES
minutes as needed for Chest pain 25 tablet 3    JANUVIA 25 MG tablet TAKE ONE TABLET BY MOUTH DAILY 90 tablet 3    spironolactone (ALDACTONE) 50 MG tablet Take 1 tablet by mouth daily 90 tablet 3    STOOL SOFTENER 100 MG capsule TAKE ONE CAPSULE BY MOUTH TWICE A DAY AS NEEDED FOR CONSTIPATION 180 capsule 3    vitamin D (CHOLECALCIFEROL) 1000 UNIT TABS tablet TAKE ONE TABLET BY MOUTH DAILY 90 tablet 3    clopidogrel (PLAVIX) 75 MG tablet TAKE ONE TABLET BY MOUTH DAILY 90 tablet 2    pantoprazole (PROTONIX) 40 MG tablet Take 1 tablet by mouth nightly 90 tablet 3    metoprolol tartrate (LOPRESSOR) 50 MG tablet Take 1 tablet by mouth 2 times daily 180 tablet 3    Ferrous Sulfate (IRON) 325 (65 Fe) MG TABS Take 1 tablet by mouth daily 90 tablet 3    tiotropium (SPIRIVA HANDIHALER) 18 MCG inhalation capsule Inhale 1 capsule into the lungs daily 60 capsule 11    metolazone (ZAROXOLYN) 2.5 MG tablet Take 1 tablet by mouth once a week On tuesday 12 tablet 0    lisinopril (PRINIVIL;ZESTRIL) 5 MG tablet Take 1 tablet by mouth daily . Discontinued Lisinopril  10 mg 90 tablet 3    PROAIR  (90 Base) MCG/ACT inhaler INHALE TWO PUFFS BY MOUTH EVERY 6 HOURS AS NEEDED FOR WHEEZING OR FOR SHORTNESS OF BREATH 18 g 5    bumetanide (BUMEX) 1 MG tablet Take 3 tablets by mouth 2 times daily 180 tablet 1    fluticasone (CUTIVATE) 0.05 % cream Apply topically 2 times daily       lidocaine (LMX) 4 % cream Apply topically daily as needed. 76.5 g 5    fluticasone (FLONASE) 50 MCG/ACT nasal spray 2 sprays by Nasal route daily 1 Bottle 3    Melatonin 10 MG TABS Take 10 mg by mouth nightly as needed (insomnia) 90 tablet 1    COMFORT ASSIST INSULIN SYRINGE 31G X 5/16\" 1 ML MISC       aspirin 81 MG tablet Take 81 mg by mouth daily. No current facility-administered medications for this visit. Social History:     Tobacco:    reports that he quit smoking about 4 weeks ago. His smoking use included Cigarettes.  He started smoking about 33 years ago. He has a 90.00 pack-year smoking history. He quit smokeless tobacco use about 23 years ago. His smokeless tobacco use included Snuff. Alcohol:      reports that he does not drink alcohol. Drug Use:  reports that he uses drugs, including Marijuana. Occupation:  Retired/disabled. Worked as a . Review of Systems:     Review of Systems   Constitutional: Positive for fatigue. Negative for chills and fever. HENT: Negative. Eyes: Negative for visual disturbance. Respiratory: Positive for shortness of breath. Negative for chest tightness. Cardiovascular: Positive for leg swelling. Negative for chest pain. Gastrointestinal: Negative for abdominal pain. Endocrine: Negative. Genitourinary: Negative. Musculoskeletal: Positive for back pain, gait problem and myalgias. Skin: Positive for color change. Negative for wound. Allergic/Immunologic: Negative. Neurological: Negative for facial asymmetry, speech difficulty and weakness. Hematological: Negative. Psychiatric/Behavioral: Negative. Physical Exam:     Vitals:  /86 (Site: Left Lower Arm, Position: Sitting, Cuff Size: Large Adult)   Pulse 86   Resp 22   Ht 6' 0.01\" (1.829 m)   Wt (!) 391 lb 12.1 oz (177.7 kg)   SpO2 98%   BMI 53.12 kg/m²     Physical Exam   Constitutional: He is oriented to person, place, and time. He appears well-developed and well-nourished. Eyes: Conjunctivae and EOM are normal.   Neck: Carotid bruit is not present. Cardiovascular: Normal rate, regular rhythm and normal heart sounds. Pulses:       Dorsalis pedis pulses are 1+ on the right side, and 1+ on the left side. Posterior tibial pulses are Detected w/ doppler on the right side, and Detected w/ doppler on the left side. Pulmonary/Chest: Effort normal. No respiratory distress. Abdominal: Soft. Normal appearance. There is no tenderness.    obese   Feet:   Right Foot:   Skin Integrity:

## 2018-11-05 DIAGNOSIS — I50.32 CHRONIC DIASTOLIC CONGESTIVE HEART FAILURE (HCC): ICD-10-CM

## 2018-11-05 DIAGNOSIS — Z29.9 PREVENTIVE MEASURE: ICD-10-CM

## 2018-11-05 RX ORDER — CLOPIDOGREL BISULFATE 75 MG/1
75 TABLET ORAL DAILY
Qty: 90 TABLET | Refills: 2 | Status: SHIPPED | OUTPATIENT
Start: 2018-11-05 | End: 2019-08-07 | Stop reason: SDUPTHER

## 2018-11-05 RX ORDER — LISINOPRIL 5 MG/1
5 TABLET ORAL DAILY
Qty: 90 TABLET | Refills: 3 | Status: SHIPPED | OUTPATIENT
Start: 2018-11-05 | End: 2019-08-07 | Stop reason: SDUPTHER

## 2018-11-15 ENCOUNTER — HOSPITAL ENCOUNTER (OUTPATIENT)
Age: 53
Discharge: HOME | End: 2018-11-15
Payer: COMMERCIAL

## 2018-11-15 ENCOUNTER — PATIENT MESSAGE (OUTPATIENT)
Dept: FAMILY MEDICINE CLINIC | Age: 53
End: 2018-11-15

## 2018-11-15 ENCOUNTER — HOSPITAL ENCOUNTER (OUTPATIENT)
Dept: OTHER | Age: 53
Discharge: HOME | End: 2018-11-15
Payer: COMMERCIAL

## 2018-11-15 VITALS
HEART RATE: 80 BPM | DIASTOLIC BLOOD PRESSURE: 76 MMHG | OXYGEN SATURATION: 98 % | RESPIRATION RATE: 20 BRPM | BODY MASS INDEX: 53.07 KG/M2 | SYSTOLIC BLOOD PRESSURE: 138 MMHG | WEIGHT: 315 LBS

## 2018-11-15 DIAGNOSIS — G89.29 CHRONIC MIDLINE LOW BACK PAIN WITH LEFT-SIDED SCIATICA: Primary | ICD-10-CM

## 2018-11-15 DIAGNOSIS — G47.01 INSOMNIA DUE TO MEDICAL CONDITION: ICD-10-CM

## 2018-11-15 DIAGNOSIS — M51.36 DDD (DEGENERATIVE DISC DISEASE), LUMBAR: ICD-10-CM

## 2018-11-15 DIAGNOSIS — I50.32 CHRONIC DIASTOLIC CONGESTIVE HEART FAILURE (HCC): ICD-10-CM

## 2018-11-15 DIAGNOSIS — M54.42 CHRONIC MIDLINE LOW BACK PAIN WITH LEFT-SIDED SCIATICA: ICD-10-CM

## 2018-11-15 DIAGNOSIS — N18.30 SECONDARY DIABETES MELLITUS WITH STAGE 3 CHRONIC KIDNEY DISEASE (HCC): ICD-10-CM

## 2018-11-15 DIAGNOSIS — G89.29 CHRONIC MIDLINE LOW BACK PAIN WITH LEFT-SIDED SCIATICA: ICD-10-CM

## 2018-11-15 DIAGNOSIS — M48.061 SPINAL STENOSIS OF LUMBAR REGION WITHOUT NEUROGENIC CLAUDICATION: ICD-10-CM

## 2018-11-15 DIAGNOSIS — M54.42 CHRONIC MIDLINE LOW BACK PAIN WITH LEFT-SIDED SCIATICA: Primary | ICD-10-CM

## 2018-11-15 DIAGNOSIS — N18.30 BENIGN HYPERTENSION WITH CKD (CHRONIC KIDNEY DISEASE) STAGE III (HCC): ICD-10-CM

## 2018-11-15 DIAGNOSIS — R60.0 BILATERAL LEG EDEMA: ICD-10-CM

## 2018-11-15 DIAGNOSIS — N18.30 CKD (CHRONIC KIDNEY DISEASE), STAGE III (HCC): ICD-10-CM

## 2018-11-15 DIAGNOSIS — N18.30 CKD (CHRONIC KIDNEY DISEASE) STAGE 3, GFR 30-59 ML/MIN (HCC): ICD-10-CM

## 2018-11-15 DIAGNOSIS — E13.22 SECONDARY DIABETES MELLITUS WITH STAGE 3 CHRONIC KIDNEY DISEASE (HCC): ICD-10-CM

## 2018-11-15 DIAGNOSIS — M96.1 POSTLAMINECTOMY SYNDROME OF LUMBAR REGION: ICD-10-CM

## 2018-11-15 DIAGNOSIS — I12.9 BENIGN HYPERTENSION WITH CKD (CHRONIC KIDNEY DISEASE) STAGE III (HCC): ICD-10-CM

## 2018-11-15 LAB
ANION GAP SERPL CALCULATED.3IONS-SCNC: 13 MMOL/L (ref 9–17)
BUN BLDV-MCNC: 34 MG/DL (ref 6–20)
BUN/CREAT BLD: ABNORMAL (ref 9–20)
CALCIUM SERPL-MCNC: 9.5 MG/DL (ref 8.6–10.4)
CHLORIDE BLD-SCNC: 97 MMOL/L (ref 98–107)
CO2: 27 MMOL/L (ref 20–31)
CREAT SERPL-MCNC: 1.41 MG/DL (ref 0.7–1.2)
GFR AFRICAN AMERICAN: >60 ML/MIN
GFR NON-AFRICAN AMERICAN: 53 ML/MIN
GFR SERPL CREATININE-BSD FRML MDRD: ABNORMAL ML/MIN/{1.73_M2}
GFR SERPL CREATININE-BSD FRML MDRD: ABNORMAL ML/MIN/{1.73_M2}
GLUCOSE BLD-MCNC: 180 MG/DL (ref 70–99)
POTASSIUM SERPL-SCNC: 4.4 MMOL/L (ref 3.7–5.3)
SODIUM BLD-SCNC: 137 MMOL/L (ref 135–144)

## 2018-11-15 PROCEDURE — 99211 OFF/OP EST MAY X REQ PHY/QHP: CPT

## 2018-11-15 PROCEDURE — 80048 BASIC METABOLIC PNL TOTAL CA: CPT

## 2018-11-15 PROCEDURE — 36415 COLL VENOUS BLD VENIPUNCTURE: CPT

## 2018-11-15 RX ORDER — OXYCODONE HYDROCHLORIDE 10 MG/1
10 TABLET ORAL EVERY 8 HOURS PRN
Qty: 90 TABLET | Refills: 0 | Status: SHIPPED | OUTPATIENT
Start: 2018-11-15 | End: 2018-12-13 | Stop reason: SDUPTHER

## 2018-11-15 RX ORDER — OXYCODONE HYDROCHLORIDE 10 MG/1
10 TABLET ORAL EVERY 8 HOURS PRN
Qty: 90 TABLET | Refills: 0 | Status: CANCELLED | OUTPATIENT
Start: 2018-11-15 | End: 2018-12-15

## 2018-11-15 NOTE — TELEPHONE ENCOUNTER
From: Bran Godwin.   To: Erik Londono MD  Sent: 11/15/2018 1:20 PM EST  Subject: Non-Urgent Medical Question    Can you please Refill my pain meds for me I will need them on Sunday I will be out thanks

## 2018-11-19 DIAGNOSIS — I10 ESSENTIAL HYPERTENSION: ICD-10-CM

## 2018-11-19 DIAGNOSIS — D50.0 IRON DEFICIENCY ANEMIA DUE TO CHRONIC BLOOD LOSS: ICD-10-CM

## 2018-11-19 RX ORDER — METOPROLOL TARTRATE 50 MG/1
50 TABLET, FILM COATED ORAL 2 TIMES DAILY
Qty: 180 TABLET | Refills: 3 | Status: SHIPPED | OUTPATIENT
Start: 2018-11-19 | End: 2019-10-10 | Stop reason: SDUPTHER

## 2018-11-19 RX ORDER — PNV NO.95/FERROUS FUM/FOLIC AC 28MG-0.8MG
1 TABLET ORAL DAILY
Qty: 90 TABLET | Refills: 3 | Status: SHIPPED | OUTPATIENT
Start: 2018-11-19 | End: 2019-10-10 | Stop reason: SDUPTHER

## 2018-11-19 NOTE — TELEPHONE ENCOUNTER
From: Thee Mccarthy.   Sent: 11/17/2018 12:19 PM EST  Subject: Medication Renewal Request    Miguel Mckeon. would like a refill of the following medications:     Ferrous Sulfate (IRON) 325 (65 Fe) MG TABS [Paige Bryant MD]     metoprolol tartrate (LOPRESSOR) 50 MG tablet Gary Gilford, MD]    Preferred pharmacy: [place] [address] [phone] - F [phone]    Comment:

## 2018-11-29 ENCOUNTER — HOSPITAL ENCOUNTER (EMERGENCY)
Age: 53
Discharge: HOME | End: 2018-11-29
Attending: EMERGENCY MEDICINE
Payer: COMMERCIAL

## 2018-11-29 VITALS
SYSTOLIC BLOOD PRESSURE: 167 MMHG | HEIGHT: 72 IN | TEMPERATURE: 98.1 F | BODY MASS INDEX: 42.66 KG/M2 | OXYGEN SATURATION: 97 % | RESPIRATION RATE: 20 BRPM | WEIGHT: 315 LBS | HEART RATE: 112 BPM | DIASTOLIC BLOOD PRESSURE: 56 MMHG

## 2018-11-29 DIAGNOSIS — M79.602 PAIN OF LEFT UPPER EXTREMITY: Primary | ICD-10-CM

## 2018-11-29 PROCEDURE — 93971 EXTREMITY STUDY: CPT

## 2018-11-29 PROCEDURE — 99283 EMERGENCY DEPT VISIT LOW MDM: CPT

## 2018-11-29 ASSESSMENT — ENCOUNTER SYMPTOMS
CHEST TIGHTNESS: 0
NAUSEA: 0
SHORTNESS OF BREATH: 0
COUGH: 0
BACK PAIN: 0
DIARRHEA: 0
VOMITING: 0
ABDOMINAL PAIN: 0

## 2018-11-29 ASSESSMENT — PAIN SCALES - GENERAL: PAINLEVEL_OUTOF10: 6

## 2018-11-30 NOTE — ED PROVIDER NOTES
Mastoiditis of left side; Mixed conductive and sensorineural hearing loss of both ears; Mixed type COPD (chronic obstructive pulmonary disease) ([facility] Morbid obesity with BMI of 45.0-49.9, adult ([facility] [facility] Neuropathy; Obesity; BARBY on CPAP; Osteoarthritis; Otitis externa of left ear; Pancreatitis chronic; Renal insufficiency; S/P cardiac cath; Severe depression ([facility] Spinal stenosis of lumbar region without neurogenic claudication; Syncope; Tinnitus of both ears; Type 2 diabetes mellitus with stage 3 chronic kidney disease, with long-term current use of insulin ([facility] Type II or unspecified type diabetes mellitus without mention of complication, not stated as uncontrolled; and Wears glasses. has a past surgical history that includes tympanomastoidectomy (Bilateral, 09/20/2012); Upper gastrointestinal endoscopy (4/13/2013); Coronary angioplasty with stent (March 2013); Hand tendon surgery (Left); Knee arthroscopy (Left); back surgery ( (x 4) 2000,.12/2011.2/2012); Nerve Block (07-31-15); Colonoscopy (11/3/2015); Cardiac catheterization (04/23/2018); and pr esophagogastroduodenoscopy transoral diagnostic (N/A, 7/18/2018).     Social History     Social History    Marital status:      Spouse name: N/A    Number of children: N/A    Years of education: N/A     Occupational History    disability      unemployed     Social History Main Topics    Smoking status: Former Smoker     Packs/day: 3.00     Years: 30.00     Types: Cigarettes     Start date: 6/22/1985     Quit date: 9/28/2018    Smokeless tobacco: Former User     Types: Snuff     Quit date: 6/22/1995      Comment: pt smoked 3 ppd x 30 years    Alcohol use No    Drug use: Yes     Types: Marijuana      Comment: 2 \"cigarettes a day, mainly at night\" 3 days ago    Sexual activity: Not Currently     Other Topics Concern    Not on file     Social History Narrative    Middle of possible separation 6/22/2016            Patient advised to stop smoking or to avoid tobacco use. Family History   Problem Relation Age of Onset    Heart Disease Mother          age 64 from [place]   Archie Jasso High Blood Pressure Mother     Diabetes Mother     High Blood Pressure Father          age 80 from CKD and Lung Fibrosis    Kidney Disease Father     Heart Disease Sister     Heart Attack Sister     Obesity Sister     Diabetes Sister         Allergies:  Lorazepam; Nsaids; Levofloxacin; and Levofloxacin    HomeMedications:  Prior to Admission medications    Medication Sig Start Date End Date Taking? Authorizing Provider   Ferrous Sulfate (IRON) 325 (65 Fe) MG TABS Take 1 tablet by mouth daily 18   Dariusz Daniels MD   metoprolol tartrate (LOPRESSOR) 50 MG tablet Take 1 tablet by mouth 2 times daily 18   Dariusz Daniels MD   oxyCODONE HCl (OXY-IR) 10 MG immediate release tablet Take 1 tablet by mouth every 8 hours as needed for Pain for up to 30 days. . 11/15/18 12/15/18  Dariusz Daniels MD   lisinopril (PRINIVIL;ZESTRIL) 5 MG tablet Take 1 tablet by mouth daily . Discontinued Lisinopril  10 mg 18   Dariusz Daniels MD   clopidogrel (PLAVIX) 75 MG tablet Take 1 tablet by mouth daily 18   Dariusz Daniels MD   metolazone (ZAROXOLYN) 2.5 MG tablet Take 1 tablet by mouth Twice a Week 18   Ander Heredia MD   bumetanide (BUMEX) 1 MG tablet Take 3 tablets by mouth 2 times daily 10/31/18   Ander Heredia MD   venlafaxine [facility]) 100 MG tablet Take 1 tablet by mouth 2 times daily 10/23/18   Dariusz Daniels MD   albuterol (PROVENTIL) (2.5 MG/3ML) 0.083% nebulizer solution Take 3 mLs by nebulization every 6 hours as needed for Wheezing or Shortness of Breath 10/23/18   Dariusz Daniels MD   pravastatin (PRAVACHOL) 40 MG tablet Take 1 tablet by mouth nightly 10/23/18   Dariusz Daniels MD   fluticasone-salmeterol (ADVAIR HFA) 230-21 MCG/ACT inhaler Inhale 2 puffs into the lungs 2 times daily 10/8/18   Paige 5/16\" 1 ML MISC  7/13/16   Historical Provider, MD   aspirin 81 MG tablet Take 81 mg by mouth daily. Historical Provider, MD       REVIEW OF SYSTEMS    (2-9 systems for level 4, 10 or more for level 5)      Review of Systems   Constitutional: Negative for chills and fever. Respiratory: Negative for cough, chest tightness and shortness of breath. Cardiovascular: Negative for chest pain and leg swelling. Gastrointestinal: Negative for abdominal pain, diarrhea, nausea and vomiting. Genitourinary: Negative for dysuria and hematuria. Musculoskeletal: Negative for arthralgias and back pain. +L arm pain   Skin: Negative for rash and wound. Allergic/Immunologic: Negative for food allergies and immunocompromised state. Neurological: Negative for dizziness, syncope, weakness and headaches. Psychiatric/Behavioral: Negative for suicidal ideas. The patient is not nervous/anxious. PHYSICAL EXAM   (up to 7 for level 4, 8or more for level 5)      INITIAL VITALS:   BP (!) 167/56   Pulse 112   Temp 98.1 °F (36.7 °C) (Oral)   Resp 20   Ht 6' (1.829 m)   Wt (!) 383 lb (173.7 kg)   SpO2 97%   BMI 51.94 kg/m²     Physical Exam   Constitutional: He is oriented to person, place, and time. He appears well-developed and well-nourished. No distress. HENT:   Head: Normocephalic and atraumatic. Right Ear: External ear normal.   Left Ear: External ear normal.   Nose: Nose normal.   Eyes: Pupils are equal, round, and reactive to light. Conjunctivae and EOM are normal. Right eye exhibits no discharge. Left eye exhibits no discharge. Neck: Normal range of motion. Neck supple. No JVD present. No tracheal deviation present. Cardiovascular: Normal rate, regular rhythm and normal heart sounds. Exam reveals no gallop and no friction rub. No murmur heard. Pulmonary/Chest: Effort normal and breath sounds normal. No stridor. No respiratory distress. He has no wheezes. He has no rales.  He exhibits no tenderness. Abdominal: Soft. Bowel sounds are normal. He exhibits no distension and no mass. There is no tenderness. There is no rebound and no guarding. Musculoskeletal: Normal range of motion. He exhibits no edema or deformity. Left forearm: He exhibits tenderness and swelling. He exhibits no bony tenderness, no edema and no deformity. Small palpable cord in the left forearm extending from the ulnar aspect of the antecubital fossa distally. Overlying skin changes no warmth or erythema or fluctuance. Patient has full range of motion of left upper extremity with normal sensation and 2+ distal pulses   Lymphadenopathy:     He has no cervical adenopathy. Neurological: He is alert and oriented to person, place, and time. Skin: Skin is warm and dry. No rash noted. He is not diaphoretic. No erythema. Psychiatric: Judgment normal.   Nursing note and vitals reviewed. DIFFERENTIAL  DIAGNOSIS     PLAN (LABS / IMAGING / EKG):  Orders Placed This Encounter   Procedures    VL DUP UPPER EXTREMITY VENOUS LEFT       MEDICATIONS ORDERED:  No orders of the defined types were placed in this encounter. DDX: DVT, low suspicion for Thrombophlebitis or abscess given no overlying erythema or warmth      DIAGNOSTIC RESULTS / EMERGENCY DEPARTMENT COURSE / MDM     LABS:  No results found for this visit on 11/29/18. IMPRESSION: This is a 79-year-old male presenting with left arm pain and swelling. On exam the patient is not tachycardic during my exam, his vitals were taken after he walked in from the car. His left arm is non-erythematous is no warmth there is some mild tenderness at the ulnar aspect of the aortic cubital fossa with a palpable cord extending distal from the antecubital fossa. There is no fluctuance. Lung sounds are diminished throughout, heart sounds are regular rate and rhythm without murmur remainder physical exam is benign.   Concern is for DVT, plan is for venous medications on file       Karen Blanco DO  Emergency Medicine Resident    (Please note that portions of thisnote were completed with a voice recognition program.  Efforts were made to edit the dictations but occasionally words are mis-transcribed.)     Karen Blanco DO  11/29/18 [id number]

## 2018-12-04 ENCOUNTER — TELEPHONE (OUTPATIENT)
Dept: FAMILY MEDICINE CLINIC | Age: 53
End: 2018-12-04

## 2018-12-11 ENCOUNTER — HOSPITAL ENCOUNTER (OUTPATIENT)
Age: 53
Discharge: HOME | End: 2018-12-11
Payer: COMMERCIAL

## 2018-12-11 LAB
ALBUMIN SERPL-MCNC: 4.2 G/DL (ref 3.5–5.2)
ALBUMIN/GLOBULIN RATIO: ABNORMAL (ref 1–2.5)
ALP BLD-CCNC: 81 U/L (ref 40–129)
ALT SERPL-CCNC: 18 U/L (ref 5–41)
ANION GAP SERPL CALCULATED.3IONS-SCNC: 13 MMOL/L (ref 9–17)
AST SERPL-CCNC: 14 U/L
BILIRUB SERPL-MCNC: 0.58 MG/DL (ref 0.3–1.2)
BUN BLDV-MCNC: 33 MG/DL (ref 6–20)
BUN/CREAT BLD: ABNORMAL (ref 9–20)
CALCIUM SERPL-MCNC: 9.8 MG/DL (ref 8.6–10.4)
CHLORIDE BLD-SCNC: 92 MMOL/L (ref 98–107)
CO2: 25 MMOL/L (ref 20–31)
CREAT SERPL-MCNC: 1.55 MG/DL (ref 0.7–1.2)
CREATININE URINE: 217 MG/DL (ref 39–259)
GFR AFRICAN AMERICAN: 57 ML/MIN
GFR NON-AFRICAN AMERICAN: 47 ML/MIN
GFR SERPL CREATININE-BSD FRML MDRD: ABNORMAL ML/MIN/{1.73_M2}
GFR SERPL CREATININE-BSD FRML MDRD: ABNORMAL ML/MIN/{1.73_M2}
GLUCOSE BLD-MCNC: 325 MG/DL (ref 70–99)
MICROALBUMIN/CREAT 24H UR: 15 MG/L
MICROALBUMIN/CREAT UR-RTO: 7 MCG/MG CREAT
POTASSIUM SERPL-SCNC: 5 MMOL/L (ref 3.7–5.3)
SODIUM BLD-SCNC: 130 MMOL/L (ref 135–144)
TOTAL PROTEIN: 7.7 G/DL (ref 6.4–8.3)

## 2018-12-11 PROCEDURE — 36415 COLL VENOUS BLD VENIPUNCTURE: CPT

## 2018-12-11 PROCEDURE — 80053 COMPREHEN METABOLIC PANEL: CPT

## 2018-12-11 PROCEDURE — 82570 ASSAY OF URINE CREATININE: CPT

## 2018-12-11 PROCEDURE — 83880 ASSAY OF NATRIURETIC PEPTIDE: CPT

## 2018-12-11 PROCEDURE — 82043 UR ALBUMIN QUANTITATIVE: CPT

## 2018-12-13 ENCOUNTER — HOSPITAL ENCOUNTER (OUTPATIENT)
Dept: OTHER | Age: 53
Discharge: HOME | End: 2018-12-13
Payer: COMMERCIAL

## 2018-12-13 ENCOUNTER — PATIENT MESSAGE (OUTPATIENT)
Dept: FAMILY MEDICINE CLINIC | Age: 53
End: 2018-12-13

## 2018-12-13 VITALS
HEART RATE: 93 BPM | BODY MASS INDEX: 53.71 KG/M2 | WEIGHT: 315 LBS | RESPIRATION RATE: 20 BRPM | SYSTOLIC BLOOD PRESSURE: 128 MMHG | DIASTOLIC BLOOD PRESSURE: 70 MMHG | OXYGEN SATURATION: 96 %

## 2018-12-13 DIAGNOSIS — N18.30 CKD (CHRONIC KIDNEY DISEASE) STAGE 3, GFR 30-59 ML/MIN (HCC): ICD-10-CM

## 2018-12-13 DIAGNOSIS — M54.42 CHRONIC MIDLINE LOW BACK PAIN WITH LEFT-SIDED SCIATICA: ICD-10-CM

## 2018-12-13 DIAGNOSIS — G47.01 INSOMNIA DUE TO MEDICAL CONDITION: ICD-10-CM

## 2018-12-13 DIAGNOSIS — M51.36 DDD (DEGENERATIVE DISC DISEASE), LUMBAR: ICD-10-CM

## 2018-12-13 DIAGNOSIS — M48.061 SPINAL STENOSIS OF LUMBAR REGION WITHOUT NEUROGENIC CLAUDICATION: ICD-10-CM

## 2018-12-13 DIAGNOSIS — M96.1 POSTLAMINECTOMY SYNDROME OF LUMBAR REGION: ICD-10-CM

## 2018-12-13 DIAGNOSIS — G89.29 CHRONIC MIDLINE LOW BACK PAIN WITH LEFT-SIDED SCIATICA: ICD-10-CM

## 2018-12-13 DIAGNOSIS — I50.32 CHRONIC DIASTOLIC CONGESTIVE HEART FAILURE (HCC): ICD-10-CM

## 2018-12-13 LAB
BNP INTERPRETATION: NORMAL
PRO-BNP: 60 PG/ML

## 2018-12-13 PROCEDURE — 99211 OFF/OP EST MAY X REQ PHY/QHP: CPT

## 2018-12-13 RX ORDER — OXYCODONE HYDROCHLORIDE 10 MG/1
10 TABLET ORAL EVERY 8 HOURS PRN
Qty: 90 TABLET | Refills: 0 | Status: SHIPPED | OUTPATIENT
Start: 2018-12-13 | End: 2019-01-09 | Stop reason: SDUPTHER

## 2018-12-13 NOTE — TELEPHONE ENCOUNTER
Please Approve or Refuse.   Send to Pharmacy per Pt's Request:      Next Visit Date:  12/19/2018   Last Visit Date: 10/23/2018    Hemoglobin A1C (%)   Date Value   09/25/2018 9.2   06/29/2018 8.5   03/21/2018 7.9             ( goal A1C is < 7)   BP Readings from Last 3 Encounters:   12/13/18 128/70   11/29/18 (!) 167/56   11/15/18 138/76          (goal 120/80)  BUN   Date Value Ref Range Status   12/11/2018 33 (H) 6 - 20 mg/dL Final     CREATININE   Date Value Ref Range Status   12/11/2018 1.55 (H) 0.70 - 1.20 mg/dL Final     Potassium   Date Value Ref Range Status   12/11/2018 5.0 3.7 - 5.3 mmol/L Final

## 2018-12-13 NOTE — TELEPHONE ENCOUNTER
From: Jalil Taylor. To: Dre Anne MD  Sent: 12/13/2018 8:28 AM EST  Subject: Non-Urgent Medical Question    Can you please Refill my pain meds for me I will be out by Saturday morning.  Oxycodone 10 Mg 1 Tablet every 8 Hours thanks

## 2018-12-16 ENCOUNTER — APPOINTMENT (OUTPATIENT)
Dept: CT IMAGING | Age: 53
DRG: 603 | End: 2018-12-16
Payer: COMMERCIAL

## 2018-12-16 ENCOUNTER — HOSPITAL ENCOUNTER (EMERGENCY)
Age: 53
Discharge: HOME | DRG: 603 | End: 2018-12-17
Attending: EMERGENCY MEDICINE
Payer: COMMERCIAL

## 2018-12-16 VITALS
WEIGHT: 315 LBS | RESPIRATION RATE: 19 BRPM | DIASTOLIC BLOOD PRESSURE: 65 MMHG | HEART RATE: 104 BPM | TEMPERATURE: 98.2 F | OXYGEN SATURATION: 97 % | BODY MASS INDEX: 42.66 KG/M2 | HEIGHT: 72 IN | SYSTOLIC BLOOD PRESSURE: 126 MMHG

## 2018-12-16 DIAGNOSIS — L03.314 CELLULITIS OF GROIN, RIGHT: Primary | ICD-10-CM

## 2018-12-16 LAB
ABSOLUTE EOS #: 0.2 K/UL (ref 0–0.4)
ABSOLUTE IMMATURE GRANULOCYTE: ABNORMAL K/UL (ref 0–0.3)
ABSOLUTE LYMPH #: 1.2 K/UL (ref 1–4.8)
ABSOLUTE MONO #: 0.5 K/UL (ref 0.1–1.3)
ANION GAP SERPL CALCULATED.3IONS-SCNC: 11 MMOL/L (ref 9–17)
BASOPHILS # BLD: 1 % (ref 0–2)
BASOPHILS ABSOLUTE: 0.1 K/UL (ref 0–0.2)
BUN BLDV-MCNC: 29 MG/DL (ref 6–20)
BUN/CREAT BLD: ABNORMAL (ref 9–20)
CALCIUM SERPL-MCNC: 9.2 MG/DL (ref 8.6–10.4)
CHLORIDE BLD-SCNC: 98 MMOL/L (ref 98–107)
CO2: 26 MMOL/L (ref 20–31)
CREAT SERPL-MCNC: 1.41 MG/DL (ref 0.7–1.2)
DIFFERENTIAL TYPE: ABNORMAL
EOSINOPHILS RELATIVE PERCENT: 3 % (ref 0–4)
GFR AFRICAN AMERICAN: >60 ML/MIN
GFR NON-AFRICAN AMERICAN: 53 ML/MIN
GFR SERPL CREATININE-BSD FRML MDRD: ABNORMAL ML/MIN/{1.73_M2}
GFR SERPL CREATININE-BSD FRML MDRD: ABNORMAL ML/MIN/{1.73_M2}
GLUCOSE BLD-MCNC: 222 MG/DL (ref 70–99)
HCT VFR BLD CALC: 37.7 % (ref 41–53)
HEMOGLOBIN: 12.7 G/DL (ref 13.5–17.5)
IMMATURE GRANULOCYTES: ABNORMAL %
LYMPHOCYTES # BLD: 15 % (ref 24–44)
MCH RBC QN AUTO: 28 PG (ref 26–34)
MCHC RBC AUTO-ENTMCNC: 33.6 G/DL (ref 31–37)
MCV RBC AUTO: 83.3 FL (ref 80–100)
MONOCYTES # BLD: 6 % (ref 1–7)
NRBC AUTOMATED: ABNORMAL PER 100 WBC
PDW BLD-RTO: 15.6 % (ref 11.5–14.9)
PLATELET # BLD: 161 K/UL (ref 150–450)
PLATELET ESTIMATE: ABNORMAL
PMV BLD AUTO: 8.9 FL (ref 6–12)
POTASSIUM SERPL-SCNC: 4.2 MMOL/L (ref 3.7–5.3)
RBC # BLD: 4.52 M/UL (ref 4.5–5.9)
RBC # BLD: ABNORMAL 10*6/UL
SEG NEUTROPHILS: 75 % (ref 36–66)
SEGMENTED NEUTROPHILS ABSOLUTE COUNT: 6.1 K/UL (ref 1.3–9.1)
SODIUM BLD-SCNC: 135 MMOL/L (ref 135–144)
WBC # BLD: 8.2 K/UL (ref 3.5–11)
WBC # BLD: ABNORMAL 10*3/UL

## 2018-12-16 PROCEDURE — 36415 COLL VENOUS BLD VENIPUNCTURE: CPT

## 2018-12-16 PROCEDURE — 85025 COMPLETE CBC W/AUTO DIFF WBC: CPT

## 2018-12-16 PROCEDURE — 6370000000 HC RX 637 (ALT 250 FOR IP): Performed by: EMERGENCY MEDICINE

## 2018-12-16 PROCEDURE — 6360000002 HC RX W HCPCS: Performed by: EMERGENCY MEDICINE

## 2018-12-16 PROCEDURE — 80048 BASIC METABOLIC PNL TOTAL CA: CPT

## 2018-12-16 PROCEDURE — 96374 THER/PROPH/DIAG INJ IV PUSH: CPT

## 2018-12-16 PROCEDURE — 72192 CT PELVIS W/O DYE: CPT

## 2018-12-16 PROCEDURE — 99284 EMERGENCY DEPT VISIT MOD MDM: CPT

## 2018-12-16 RX ORDER — CEPHALEXIN 250 MG/1
500 CAPSULE ORAL ONCE
Status: COMPLETED | OUTPATIENT
Start: 2018-12-17 | End: 2018-12-16

## 2018-12-16 RX ORDER — MORPHINE SULFATE 4 MG/ML
4 INJECTION, SOLUTION INTRAMUSCULAR; INTRAVENOUS ONCE
Status: COMPLETED | OUTPATIENT
Start: 2018-12-16 | End: 2018-12-16

## 2018-12-16 RX ORDER — CEPHALEXIN 500 MG/1
500 CAPSULE ORAL 4 TIMES DAILY
Qty: 40 CAPSULE | Refills: 0 | Status: ON HOLD | OUTPATIENT
Start: 2018-12-16 | End: 2018-12-20 | Stop reason: HOSPADM

## 2018-12-16 RX ADMIN — MORPHINE SULFATE 4 MG: 4 INJECTION INTRAVENOUS at 23:19

## 2018-12-16 RX ADMIN — CEPHALEXIN 500 MG: 250 CAPSULE ORAL at 23:55

## 2018-12-16 ASSESSMENT — ENCOUNTER SYMPTOMS
COUGH: 0
CHEST TIGHTNESS: 0
ABDOMINAL PAIN: 0
DIARRHEA: 0
SHORTNESS OF BREATH: 0
NAUSEA: 0
BACK PAIN: 0
VOMITING: 0

## 2018-12-16 ASSESSMENT — PAIN DESCRIPTION - PAIN TYPE: TYPE: ACUTE PAIN

## 2018-12-16 ASSESSMENT — PAIN SCALES - GENERAL
PAINLEVEL_OUTOF10: 4
PAINLEVEL_OUTOF10: 8
PAINLEVEL_OUTOF10: 8

## 2018-12-16 ASSESSMENT — PAIN DESCRIPTION - LOCATION: LOCATION: GROIN

## 2018-12-16 ASSESSMENT — PAIN DESCRIPTION - ORIENTATION: ORIENTATION: RIGHT

## 2018-12-17 ENCOUNTER — TELEPHONE (OUTPATIENT)
Dept: FAMILY MEDICINE CLINIC | Age: 53
End: 2018-12-17

## 2018-12-17 NOTE — ED PROVIDER NOTES
[address] ED  Emergency Department Encounter  EmergencyMedicine Resident     Pt Name:Miguel Gongora MRN: [id number]  Birthdate 1965  Date of evaluation: 12/16/18  PCP:  Dariusz Daniels MD    CHIEF COMPLAINT       No chief complaint on file. HISTORY OF PRESENT ILLNESS  (Location/Symptom, Timing/Onset, Context/Setting, Quality, Duration,Modifying Factors, Severity.)      Miguel Gongora is a 47 y.o. male who presents with Abscess. Patient states he's noticed an abscess in his right inguinal canal over the past 3 days. Says that it started off about the size of a quarter it was tender and warm. States he put warm compresses on it tried to squeeze a few times and said he felt it pop\" in words\". Says that since 3 days ago at extended both into his scrotum and closer towards his hip bone. He is a diabetic states his sugars have been in the 300s which is slightly higher than his normal baseline in the 200s. States that he has not had any fevers or chills nausea or vomiting. Denies any constipation. PAST MEDICAL / SURGICAL / SOCIAL / FAMILY HISTORY      has a past medical history of Acute on chronic kidney failure ([address] Adhesive capsulitis of left shoulder; Anxiety; Arthropathy, unspecified, other specified sites; Asthma; Bilateral lower leg cellulitis; Bilateral lower leg cellulitis; Bilateral lower leg cellulitis; Blood in stool; CAD (coronary artery disease); Cellulitis of both lower extremities; CHF (congestive heart failure), NYHA class III ([address] Chronic back pain; Chronic headaches; Chronic kidney disease; COPD (chronic obstructive pulmonary disease) (HCC); COPD exacerbation ([address] Diabetic neuropathy ([address] Displacement of lumbar intervertebral disc without myelopathy; Ear infection; Facial cellulitis; Fall; GERD (gastroesophageal reflux disease); Head injury; Hearing loss in right ear; Hepatic steatosis; History of general anesthesia complication;  History of rib Social History Narrative    Middle of possible separation 2016            Patient advised to stop smoking or to avoid tobacco use. Family History   Problem Relation Age of Onset    Heart Disease Mother          age 64 from [facility] High Blood Pressure Mother     Diabetes Mother     High Blood Pressure Father          age 80 from CKD and Lung Fibrosis    Kidney Disease Father     Heart Disease Sister     Heart Attack Sister     Obesity Sister     Diabetes Sister         Allergies:  Lorazepam; Nsaids; Levofloxacin; and Levofloxacin    HomeMedications:  Prior to Admission medications    Medication Sig Start Date End Date Taking? Authorizing Provider   cephALEXin (KEFLEX) 500 MG capsule Take 1 capsule by mouth 4 times daily for 10 days 18 Yes Reinaldo Blanco DO   oxyCODONE HCl (OXY-IR) 10 MG immediate release tablet Take 1 tablet by mouth every 8 hours as needed for Pain for up to 30 days. . 18  Diallo Moyer MD   Ferrous Sulfate (IRON) 325 (65 Fe) MG TABS Take 1 tablet by mouth daily 18   Diallo Moyer MD   metoprolol tartrate (LOPRESSOR) 50 MG tablet Take 1 tablet by mouth 2 times daily 18   Diallo Moyer MD   lisinopril (PRINIVIL;ZESTRIL) 5 MG tablet Take 1 tablet by mouth daily . Discontinued Lisinopril  10 mg 18   Diallo Moyer MD   clopidogrel (PLAVIX) 75 MG tablet Take 1 tablet by mouth daily 18   Diallo Moyer MD   metolazone (ZAROXOLYN) 2.5 MG tablet Take 1 tablet by mouth Twice a Week 18   Justyn Grace MD   bumetanide (BUMEX) 1 MG tablet Take 3 tablets by mouth 2 times daily 10/31/18   Justyn Grace MD   venlafaxine [facility]) 100 MG tablet Take 1 tablet by mouth 2 times daily 10/23/18   Diallo Moyer MD   albuterol (PROVENTIL) (2.5 MG/3ML) 0.083% nebulizer solution Take 3 mLs by nebulization every 6 hours as needed for Wheezing or Shortness of Breath 10/23/18

## 2018-12-19 ENCOUNTER — HOSPITAL ENCOUNTER (INPATIENT)
Age: 53
LOS: 1 days | Discharge: HOME | DRG: 603 | End: 2018-12-20
Attending: INTERNAL MEDICINE | Admitting: INTERNAL MEDICINE
Payer: COMMERCIAL

## 2018-12-19 ENCOUNTER — OFFICE VISIT (OUTPATIENT)
Dept: FAMILY MEDICINE CLINIC | Age: 53
End: 2018-12-19
Payer: COMMERCIAL

## 2018-12-19 VITALS
OXYGEN SATURATION: 97 % | SYSTOLIC BLOOD PRESSURE: 132 MMHG | BODY MASS INDEX: 42.66 KG/M2 | HEIGHT: 72 IN | HEART RATE: 89 BPM | WEIGHT: 315 LBS | DIASTOLIC BLOOD PRESSURE: 63 MMHG

## 2018-12-19 DIAGNOSIS — S31.109D OPEN WOUND OF GROIN, SUBSEQUENT ENCOUNTER: Primary | ICD-10-CM

## 2018-12-19 DIAGNOSIS — R73.9 HYPERGLYCEMIA: ICD-10-CM

## 2018-12-19 DIAGNOSIS — E11.40 TYPE 2 DIABETES MELLITUS WITH DIABETIC NEUROPATHY, WITH LONG-TERM CURRENT USE OF INSULIN (HCC): ICD-10-CM

## 2018-12-19 DIAGNOSIS — I10 ESSENTIAL HYPERTENSION: ICD-10-CM

## 2018-12-19 DIAGNOSIS — N18.30 CKD (CHRONIC KIDNEY DISEASE) STAGE 3, GFR 30-59 ML/MIN (HCC): ICD-10-CM

## 2018-12-19 DIAGNOSIS — S31.109A OPEN WOUND OF GROIN, INITIAL ENCOUNTER: Primary | ICD-10-CM

## 2018-12-19 DIAGNOSIS — Z79.4 TYPE 2 DIABETES MELLITUS WITH DIABETIC NEUROPATHY, WITH LONG-TERM CURRENT USE OF INSULIN (HCC): ICD-10-CM

## 2018-12-19 DIAGNOSIS — I50.43 ACUTE ON CHRONIC COMBINED SYSTOLIC AND DIASTOLIC CHF (CONGESTIVE HEART FAILURE) (HCC): ICD-10-CM

## 2018-12-19 DIAGNOSIS — J44.9 MIXED TYPE COPD (CHRONIC OBSTRUCTIVE PULMONARY DISEASE) (HCC): ICD-10-CM

## 2018-12-19 DIAGNOSIS — I25.118 CORONARY ARTERY DISEASE OF NATIVE ARTERY OF NATIVE HEART WITH STABLE ANGINA PECTORIS (HCC): ICD-10-CM

## 2018-12-19 DIAGNOSIS — G47.33 OSA TREATED WITH BIPAP: ICD-10-CM

## 2018-12-19 PROBLEM — L03.319 CELLULITIS AND ABSCESS OF TRUNK: Status: ACTIVE | Noted: 2018-12-19

## 2018-12-19 PROBLEM — L02.219 CELLULITIS AND ABSCESS OF TRUNK: Status: ACTIVE | Noted: 2018-12-19

## 2018-12-19 LAB
CREAT SERPL-MCNC: 1.32 MG/DL (ref 0.7–1.2)
GFR AFRICAN AMERICAN: >60 ML/MIN
GFR NON-AFRICAN AMERICAN: 57 ML/MIN
GFR SERPL CREATININE-BSD FRML MDRD: ABNORMAL ML/MIN/{1.73_M2}
GFR SERPL CREATININE-BSD FRML MDRD: ABNORMAL ML/MIN/{1.73_M2}
GLUCOSE BLD-MCNC: 253 MG/DL (ref 75–110)
GLUCOSE BLD-MCNC: 315 MG/DL (ref 75–110)
PROCALCITONIN: 0.07 NG/ML

## 2018-12-19 PROCEDURE — 82947 ASSAY GLUCOSE BLOOD QUANT: CPT

## 2018-12-19 PROCEDURE — 2022F DILAT RTA XM EVC RTNOPTHY: CPT | Performed by: FAMILY MEDICINE

## 2018-12-19 PROCEDURE — 6360000002 HC RX W HCPCS: Performed by: INTERNAL MEDICINE

## 2018-12-19 PROCEDURE — 99223 1ST HOSP IP/OBS HIGH 75: CPT | Performed by: INTERNAL MEDICINE

## 2018-12-19 PROCEDURE — G8427 DOCREV CUR MEDS BY ELIG CLIN: HCPCS | Performed by: FAMILY MEDICINE

## 2018-12-19 PROCEDURE — 1036F TOBACCO NON-USER: CPT | Performed by: FAMILY MEDICINE

## 2018-12-19 PROCEDURE — G8598 ASA/ANTIPLAT THER USED: HCPCS | Performed by: FAMILY MEDICINE

## 2018-12-19 PROCEDURE — 6360000002 HC RX W HCPCS: Performed by: STUDENT IN AN ORGANIZED HEALTH CARE EDUCATION/TRAINING PROGRAM

## 2018-12-19 PROCEDURE — 84145 PROCALCITONIN (PCT): CPT

## 2018-12-19 PROCEDURE — 1200000000 HC SEMI PRIVATE

## 2018-12-19 PROCEDURE — 3046F HEMOGLOBIN A1C LEVEL >9.0%: CPT | Performed by: FAMILY MEDICINE

## 2018-12-19 PROCEDURE — 3017F COLORECTAL CA SCREEN DOC REV: CPT | Performed by: FAMILY MEDICINE

## 2018-12-19 PROCEDURE — 3023F SPIROM DOC REV: CPT | Performed by: FAMILY MEDICINE

## 2018-12-19 PROCEDURE — 87075 CULTR BACTERIA EXCEPT BLOOD: CPT

## 2018-12-19 PROCEDURE — 86403 PARTICLE AGGLUT ANTBDY SCRN: CPT

## 2018-12-19 PROCEDURE — G8482 FLU IMMUNIZE ORDER/ADMIN: HCPCS | Performed by: FAMILY MEDICINE

## 2018-12-19 PROCEDURE — 87205 SMEAR GRAM STAIN: CPT

## 2018-12-19 PROCEDURE — 87070 CULTURE OTHR SPECIMN AEROBIC: CPT

## 2018-12-19 PROCEDURE — G8926 SPIRO NO PERF OR DOC: HCPCS | Performed by: FAMILY MEDICINE

## 2018-12-19 PROCEDURE — G8417 CALC BMI ABV UP PARAM F/U: HCPCS | Performed by: FAMILY MEDICINE

## 2018-12-19 PROCEDURE — 2580000003 HC RX 258: Performed by: STUDENT IN AN ORGANIZED HEALTH CARE EDUCATION/TRAINING PROGRAM

## 2018-12-19 PROCEDURE — 99215 OFFICE O/P EST HI 40 MIN: CPT | Performed by: FAMILY MEDICINE

## 2018-12-19 PROCEDURE — 36415 COLL VENOUS BLD VENIPUNCTURE: CPT

## 2018-12-19 PROCEDURE — 2580000003 HC RX 258: Performed by: INTERNAL MEDICINE

## 2018-12-19 PROCEDURE — 87040 BLOOD CULTURE FOR BACTERIA: CPT

## 2018-12-19 PROCEDURE — 6370000000 HC RX 637 (ALT 250 FOR IP): Performed by: STUDENT IN AN ORGANIZED HEALTH CARE EDUCATION/TRAINING PROGRAM

## 2018-12-19 PROCEDURE — 82565 ASSAY OF CREATININE: CPT

## 2018-12-19 RX ORDER — DEXTROSE MONOHYDRATE 50 MG/ML
100 INJECTION, SOLUTION INTRAVENOUS PRN
Status: DISCONTINUED | OUTPATIENT
Start: 2018-12-19 | End: 2018-12-20 | Stop reason: HOSPADM

## 2018-12-19 RX ORDER — METOPROLOL TARTRATE 50 MG/1
50 TABLET, FILM COATED ORAL 2 TIMES DAILY
Status: DISCONTINUED | OUTPATIENT
Start: 2018-12-19 | End: 2018-12-20 | Stop reason: HOSPADM

## 2018-12-19 RX ORDER — PANTOPRAZOLE SODIUM 40 MG/1
40 TABLET, DELAYED RELEASE ORAL NIGHTLY
Status: DISCONTINUED | OUTPATIENT
Start: 2018-12-19 | End: 2018-12-20 | Stop reason: HOSPADM

## 2018-12-19 RX ORDER — NICOTINE POLACRILEX 4 MG
15 LOZENGE BUCCAL PRN
Status: DISCONTINUED | OUTPATIENT
Start: 2018-12-19 | End: 2018-12-20 | Stop reason: HOSPADM

## 2018-12-19 RX ORDER — ONDANSETRON 2 MG/ML
4 INJECTION INTRAMUSCULAR; INTRAVENOUS EVERY 6 HOURS PRN
Status: DISCONTINUED | OUTPATIENT
Start: 2018-12-19 | End: 2018-12-20 | Stop reason: HOSPADM

## 2018-12-19 RX ORDER — ASPIRIN 81 MG/1
81 TABLET, CHEWABLE ORAL DAILY
Status: DISCONTINUED | OUTPATIENT
Start: 2018-12-19 | End: 2018-12-20 | Stop reason: HOSPADM

## 2018-12-19 RX ORDER — DIPHENHYDRAMINE HCL 25 MG
25 TABLET ORAL EVERY 6 HOURS PRN
Status: DISCONTINUED | OUTPATIENT
Start: 2018-12-19 | End: 2018-12-19

## 2018-12-19 RX ORDER — DEXTROSE MONOHYDRATE 25 G/50ML
12.5 INJECTION, SOLUTION INTRAVENOUS PRN
Status: DISCONTINUED | OUTPATIENT
Start: 2018-12-19 | End: 2018-12-20 | Stop reason: HOSPADM

## 2018-12-19 RX ORDER — VENLAFAXINE 50 MG/1
100 TABLET ORAL 2 TIMES DAILY
Status: DISCONTINUED | OUTPATIENT
Start: 2018-12-19 | End: 2018-12-20 | Stop reason: HOSPADM

## 2018-12-19 RX ORDER — ALBUTEROL SULFATE 2.5 MG/3ML
2.5 SOLUTION RESPIRATORY (INHALATION) EVERY 6 HOURS PRN
Status: DISCONTINUED | OUTPATIENT
Start: 2018-12-19 | End: 2018-12-20 | Stop reason: HOSPADM

## 2018-12-19 RX ORDER — MAGNESIUM SULFATE 1 G/100ML
1 INJECTION INTRAVENOUS PRN
Status: DISCONTINUED | OUTPATIENT
Start: 2018-12-19 | End: 2018-12-20 | Stop reason: HOSPADM

## 2018-12-19 RX ORDER — TIZANIDINE 4 MG/1
4 TABLET ORAL EVERY 8 HOURS PRN
Status: DISCONTINUED | OUTPATIENT
Start: 2018-12-19 | End: 2018-12-20 | Stop reason: HOSPADM

## 2018-12-19 RX ORDER — LISINOPRIL 5 MG/1
5 TABLET ORAL DAILY
Status: DISCONTINUED | OUTPATIENT
Start: 2018-12-19 | End: 2018-12-20 | Stop reason: HOSPADM

## 2018-12-19 RX ORDER — BUMETANIDE 1 MG/1
3 TABLET ORAL 2 TIMES DAILY
Status: DISCONTINUED | OUTPATIENT
Start: 2018-12-19 | End: 2018-12-20 | Stop reason: HOSPADM

## 2018-12-19 RX ORDER — NITROGLYCERIN 0.4 MG/1
0.4 TABLET SUBLINGUAL EVERY 5 MIN PRN
Status: DISCONTINUED | OUTPATIENT
Start: 2018-12-19 | End: 2018-12-20 | Stop reason: HOSPADM

## 2018-12-19 RX ORDER — PRAVASTATIN SODIUM 40 MG
40 TABLET ORAL NIGHTLY
Status: DISCONTINUED | OUTPATIENT
Start: 2018-12-19 | End: 2018-12-20 | Stop reason: HOSPADM

## 2018-12-19 RX ORDER — SPIRONOLACTONE 25 MG/1
50 TABLET ORAL DAILY
Status: DISCONTINUED | OUTPATIENT
Start: 2018-12-19 | End: 2018-12-20 | Stop reason: HOSPADM

## 2018-12-19 RX ORDER — POTASSIUM CHLORIDE 7.45 MG/ML
10 INJECTION INTRAVENOUS PRN
Status: DISCONTINUED | OUTPATIENT
Start: 2018-12-19 | End: 2018-12-20 | Stop reason: HOSPADM

## 2018-12-19 RX ORDER — FLUTICASONE PROPIONATE 50 MCG
2 SPRAY, SUSPENSION (ML) NASAL DAILY
Status: DISCONTINUED | OUTPATIENT
Start: 2018-12-19 | End: 2018-12-20 | Stop reason: HOSPADM

## 2018-12-19 RX ORDER — NICOTINE 21 MG/24HR
1 PATCH, TRANSDERMAL 24 HOURS TRANSDERMAL EVERY 24 HOURS
Status: DISCONTINUED | OUTPATIENT
Start: 2018-12-19 | End: 2018-12-20 | Stop reason: HOSPADM

## 2018-12-19 RX ORDER — METOLAZONE 2.5 MG/1
2.5 TABLET ORAL
Status: DISCONTINUED | OUTPATIENT
Start: 2018-12-20 | End: 2018-12-20 | Stop reason: HOSPADM

## 2018-12-19 RX ORDER — DIPHENHYDRAMINE HYDROCHLORIDE 50 MG/ML
25 INJECTION INTRAMUSCULAR; INTRAVENOUS EVERY 6 HOURS PRN
Status: DISCONTINUED | OUTPATIENT
Start: 2018-12-19 | End: 2018-12-20 | Stop reason: HOSPADM

## 2018-12-19 RX ORDER — CLOPIDOGREL BISULFATE 75 MG/1
75 TABLET ORAL DAILY
Status: DISCONTINUED | OUTPATIENT
Start: 2018-12-19 | End: 2018-12-20 | Stop reason: HOSPADM

## 2018-12-19 RX ORDER — SODIUM CHLORIDE 0.9 % (FLUSH) 0.9 %
10 SYRINGE (ML) INJECTION PRN
Status: DISCONTINUED | OUTPATIENT
Start: 2018-12-19 | End: 2018-12-20 | Stop reason: HOSPADM

## 2018-12-19 RX ORDER — SODIUM CHLORIDE 0.9 % (FLUSH) 0.9 %
10 SYRINGE (ML) INJECTION EVERY 12 HOURS SCHEDULED
Status: DISCONTINUED | OUTPATIENT
Start: 2018-12-19 | End: 2018-12-20 | Stop reason: HOSPADM

## 2018-12-19 RX ORDER — OXYCODONE HYDROCHLORIDE 5 MG/1
10 TABLET ORAL EVERY 8 HOURS PRN
Status: DISCONTINUED | OUTPATIENT
Start: 2018-12-19 | End: 2018-12-20 | Stop reason: HOSPADM

## 2018-12-19 RX ORDER — ALBUTEROL SULFATE 90 UG/1
2 AEROSOL, METERED RESPIRATORY (INHALATION) EVERY 6 HOURS PRN
Status: DISCONTINUED | OUTPATIENT
Start: 2018-12-19 | End: 2018-12-20 | Stop reason: HOSPADM

## 2018-12-19 RX ADMIN — Medication 10 ML: at 18:30

## 2018-12-19 RX ADMIN — VANCOMYCIN HYDROCHLORIDE 1500 MG: 5 INJECTION, POWDER, LYOPHILIZED, FOR SOLUTION INTRAVENOUS at 17:56

## 2018-12-19 RX ADMIN — INSULIN HUMAN 175 UNITS: 500 INJECTION, SOLUTION SUBCUTANEOUS at 18:05

## 2018-12-19 RX ADMIN — VENLAFAXINE 100 MG: 50 TABLET ORAL at 20:58

## 2018-12-19 RX ADMIN — ONDANSETRON 4 MG: 2 INJECTION INTRAMUSCULAR; INTRAVENOUS at 18:30

## 2018-12-19 RX ADMIN — PIPERACILLIN SODIUM,TAZOBACTAM SODIUM 3.38 G: 3; .375 INJECTION, POWDER, FOR SOLUTION INTRAVENOUS at 23:55

## 2018-12-19 RX ADMIN — PRAVASTATIN SODIUM 40 MG: 40 TABLET ORAL at 20:56

## 2018-12-19 RX ADMIN — Medication 10 ML: at 20:58

## 2018-12-19 RX ADMIN — METOPROLOL TARTRATE 50 MG: 50 TABLET ORAL at 20:56

## 2018-12-19 RX ADMIN — TIOTROPIUM BROMIDE 18 MCG: 18 CAPSULE ORAL; RESPIRATORY (INHALATION) at 18:00

## 2018-12-19 RX ADMIN — FLUTICASONE PROPIONATE 2 SPRAY: 50 SPRAY, METERED NASAL at 18:00

## 2018-12-19 RX ADMIN — Medication 1 MG: at 23:53

## 2018-12-19 RX ADMIN — OXYCODONE HYDROCHLORIDE 10 MG: 5 TABLET ORAL at 18:07

## 2018-12-19 RX ADMIN — DIPHENHYDRAMINE HYDROCHLORIDE 25 MG: 50 INJECTION, SOLUTION INTRAMUSCULAR; INTRAVENOUS at 19:05

## 2018-12-19 RX ADMIN — Medication 2 PUFF: at 20:57

## 2018-12-19 RX ADMIN — ENOXAPARIN SODIUM 40 MG: 40 INJECTION, SOLUTION INTRAVENOUS; SUBCUTANEOUS at 20:57

## 2018-12-19 RX ADMIN — Medication 10 ML: at 17:55

## 2018-12-19 RX ADMIN — PANTOPRAZOLE SODIUM 40 MG: 40 TABLET, DELAYED RELEASE ORAL at 20:56

## 2018-12-19 ASSESSMENT — PAIN DESCRIPTION - PAIN TYPE
TYPE: ACUTE PAIN
TYPE: ACUTE PAIN

## 2018-12-19 ASSESSMENT — PAIN DESCRIPTION - DESCRIPTORS: DESCRIPTORS: DISCOMFORT

## 2018-12-19 ASSESSMENT — PAIN DESCRIPTION - LOCATION
LOCATION: BACK;GROIN
LOCATION: BACK;GROIN

## 2018-12-19 ASSESSMENT — ENCOUNTER SYMPTOMS
SHORTNESS OF BREATH: 1
COUGH: 1
ABDOMINAL DISTENTION: 0
CHEST TIGHTNESS: 0
NAUSEA: 0
WHEEZING: 0
EYE DISCHARGE: 0
CONSTIPATION: 0
ABDOMINAL PAIN: 1
BACK PAIN: 1
VOMITING: 0
SHORTNESS OF BREATH: 1
DIARRHEA: 0
ABDOMINAL DISTENTION: 1
RHINORRHEA: 0
APNEA: 1
CHEST TIGHTNESS: 0
ABDOMINAL PAIN: 1

## 2018-12-19 ASSESSMENT — PAIN DESCRIPTION - ONSET: ONSET: ON-GOING

## 2018-12-19 ASSESSMENT — PAIN SCALES - GENERAL
PAINLEVEL_OUTOF10: 6
PAINLEVEL_OUTOF10: 8
PAINLEVEL_OUTOF10: 8

## 2018-12-19 ASSESSMENT — PAIN DESCRIPTION - ORIENTATION
ORIENTATION: RIGHT
ORIENTATION: RIGHT

## 2018-12-19 NOTE — PROGRESS NOTES
Pharmacy Note  Zosyn Consult    Miguel Dayton Carvajal. is a 47 y.o. male started on Zosyn for Inguinal Cellulitis; consult received from Dr. Taina Long to manage therapy. Also receiving the following antibiotics: Vancomycin.     Patient Active Problem List   Diagnosis    DDD (degenerative disc disease), lumbar    Hypertriglyceridemia    LIZETH (acute kidney [facility]) ([facility]    CKD (chronic kidney disease) stage 3, GFR 30-59 ml/min ([facility])    CAD (coronary artery disease) s/p 1 stent    Mixed type COPD (chronic obstructive pulmonary disease) ([facility])    Type 2 diabetes mellitus with diabetic neuropathy, with long-term current use of insulin (HCC)    Chronic pancreatitis ([facility])    Displacement of lumbar intervertebral disc without myelopathy    Obesity, morbid (more than 100 lbs over ideal weight or BMI > 40) ([facility])    Chronic diastolic congestive heart failure ([facility])    Peripheral neuropathy ([facility])    Insomnia    BPH (benign prostatic hyperplasia)    Smoker    Class 3 obesity due to excess calories with serious comorbidity and body mass index (BMI) of 50.0 to 59.9 in adult    Essential hypertension    Hepatic steatosis    History of rib fracture    Umbilical hernia    Left inguinal hernia    Adrenal gland anomaly    Lumbar disc narrowing    Spinal stenosis of lumbar region without neurogenic claudication    Chronic back pain greater than 3 months duration    Gastroesophageal reflux disease without esophagitis    Hyperlipidemia with target LDL less than 70    Precordial pain    Lower urinary tract symptoms (LUTS)    Vitamin D deficiency    Iron deficiency anemia due to chronic blood loss    BARBY treated with BiPAP    Chronic midline low back pain with left-sided sciatica    Stasis dermatitis of both legs    Anxiety    Positive depression screening    Moderate recurrent major depression (HCC)    History of recurrent ear infection    Anemia    Mixed conductive and sensorineural hearing loss of both ears

## 2018-12-19 NOTE — H&P
neuropathy ([address] 8/14/2013    Displacement of lumbar intervertebral disc without myelopathy 6/13/2013    Ear infection     RIGHT    Facial cellulitis 2012    Fall 3/25/2017    GERD (gastroesophageal reflux disease)     Head injury     Hearing loss in right ear     pencil pierced ear as a child    Hepatic steatosis 12/3/2015    History of general anesthesia complication     has woke up during surgery under anesthesia    History of rib fracture 12/3/2015    Chronic     Hyperlipidemia     Hypertension     Insomnia     Intolerance of continuous positive airway pressure (CPAP) ventilation 7/20/2017    Mastoiditis of left side     Mixed conductive and sensorineural hearing loss of both ears 1/10/2017    Per ENT    Mixed type COPD (chronic obstructive pulmonary disease) ([address]     Morbid obesity with BMI of 45.0-49.9, adult ([address] 6/16/2015    Neuropathy     Obesity     BARBY on CPAP     Osteoarthritis     Otitis externa of left ear     Pancreatitis chronic     Renal insufficiency     proteinuria    S/P cardiac cath 04/23/2018    Severe depression ([address] 9/25/2013    Spinal stenosis of lumbar region without neurogenic claudication 1/6/2016    MRI lumbar 12/30/15 L3-L4: There is broad-based bulging disc which appears protruding left laterally causing flattening of the ventral thecal sac. In addition, there is facet arthropathy with mild hypertrophic changes.  There is borderline central canal stenosis with  evidence of moderate left neural foraminal narrowing and mild right neural foramina narrowing.   L4-L5: There is broad-based protrud    Syncope 4/28/2017    Tinnitus of both ears 1/10/2017    Per ENT    Type 2 diabetes mellitus with stage 3 chronic kidney disease, with long-term current use of insulin ([address] 12/26/2016    Type II or unspecified type diabetes mellitus without mention of complication, not stated as uncontrolled     uncontrolled    Wears glasses     for reading        Past 17   Paige Bryant MD   fluticasone (CUTIVATE) 0.05 % cream Apply topically 2 times daily  17   Historical Provider, MD   lidocaine (LMX) 4 % cream Apply topically daily as needed. 3/23/17   Laura Garcia MD   fluticasone (FLONASE) 50 MCG/ACT nasal spray 2 sprays by Nasal route daily 17   Laura Garcia MD   Melatonin 10 MG TABS Take 10 mg by mouth nightly as needed (insomnia) 16   Laura Garcia MD   COMFORT ASSIST INSULIN SYRINGE 31G X \" 1 ML MISC  16   Historical Provider, MD   aspirin 81 MG tablet Take 81 mg by mouth daily. Historical Provider, MD        Allergies:     Lorazepam; Nsaids; Levofloxacin; and Levofloxacin    Social History:     Tobacco:    reports that he quit smoking about 2 months ago. His smoking use included Cigarettes. He started smoking about 33 years ago. He has a 90.00 pack-year smoking history. He quit smokeless tobacco use about 23 years ago. His smokeless tobacco use included Snuff. Alcohol:      reports that he does not drink alcohol. Drug Use:  reports that he uses drugs, including Marijuana. Family History:     Family History   Problem Relation Age of Onset    Heart Disease Mother          age 64 from [place]   Kathleen Weaver High Blood Pressure Mother     Diabetes Mother     High Blood Pressure Father          age 80 from CKD and Lung Fibrosis    Kidney Disease Father     Heart Disease Sister     Heart Attack Sister     Obesity Sister     Diabetes Sister        Review of Systems:     Positive and Negative as described in HPI. Review of Systems   Constitutional: Positive for activity change, chills and fatigue. Negative for appetite change and fever. HENT: Negative for congestion, postnasal drip and rhinorrhea. Eyes: Negative for discharge and visual disturbance. Respiratory: Positive for shortness of breath. Negative for chest tightness. Cardiovascular: Positive for leg swelling. Negative for chest pain.
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
## 2018-12-19 NOTE — PROGRESS NOTES
Visit Information    Have you changed or started any medications since your last visit including any over-the-counter medicines, vitamins, or herbal medicines? no   Are you having any side effects from any of your medications? -  no  Have you stopped taking any of your medications? Is so, why? -  no    Have you seen any other physician or provider since your last visit? Yes - Records Obtained  Have you had any other diagnostic tests since your last visit? Yes - Records Obtained  Have you been seen in the emergency room and/or had an admission to a hospital since we last saw you? Yes - Records Obtained  Have you had your routine dental cleaning in the past 6 months? no    Have you activated your [employer] account? If not, what are your barriers?  Yes     Patient Care Team:  Franki Camilo MD as PCP - General (Family Medicine)  Khalida Robles MD as Consulting Physician (Otolaryngology)  Howard Eden MD as Consulting Physician (Endocrinology)  Fiorella Correia DO as Consulting Physician (Cardiology)  Rafiq Gonzalez DPM as Surgeon (Podiatry)  Irasema Mcclure MD as Consulting Physician (Ophthalmology)  Felix Zapata MD as Consulting Physician (Nephrology)  Anthony Parra MD as Consulting Physician (Pulmonology)  Diana Lee MD as Consulting Physician (Pulmonology)  Estiven Babb MD as Surgeon (Vascular Surgery)  Bartolome Espinosa MD as Consulting Physician (Gastroenterology)    Medical History Review  Past Medical, Family, and Social History reviewed and does contribute to the patient presenting condition    Health Maintenance   Topic Date Due    Shingles Vaccine (1 of 2 - 2 Dose Series) 12/30/2014    Colon cancer screen colonoscopy  11/03/2017    Diabetic retinal exam  06/14/2018    A1C test (Diabetic or Prediabetic)  12/25/2018    Diabetic foot exam  04/19/2019    Lipid screen  09/20/2019    Potassium monitoring  12/16/2019    Creatinine monitoring  12/16/2019    DTaP/Tdap/Td

## 2018-12-20 VITALS
RESPIRATION RATE: 19 BRPM | SYSTOLIC BLOOD PRESSURE: 132 MMHG | WEIGHT: 315 LBS | HEART RATE: 85 BPM | BODY MASS INDEX: 42.66 KG/M2 | DIASTOLIC BLOOD PRESSURE: 40 MMHG | HEIGHT: 72 IN | OXYGEN SATURATION: 96 % | TEMPERATURE: 98.2 F

## 2018-12-20 LAB
ABSOLUTE EOS #: 0.2 K/UL (ref 0–0.4)
ABSOLUTE IMMATURE GRANULOCYTE: ABNORMAL K/UL (ref 0–0.3)
ABSOLUTE LYMPH #: 1.2 K/UL (ref 1–4.8)
ABSOLUTE MONO #: 0.6 K/UL (ref 0.1–1.3)
ANION GAP SERPL CALCULATED.3IONS-SCNC: 14 MMOL/L (ref 9–17)
BASOPHILS # BLD: 1 % (ref 0–2)
BASOPHILS ABSOLUTE: 0.1 K/UL (ref 0–0.2)
BUN BLDV-MCNC: 27 MG/DL (ref 6–20)
BUN/CREAT BLD: ABNORMAL (ref 9–20)
CALCIUM SERPL-MCNC: 8.9 MG/DL (ref 8.6–10.4)
CHLORIDE BLD-SCNC: 100 MMOL/L (ref 98–107)
CO2: 24 MMOL/L (ref 20–31)
CREAT SERPL-MCNC: 1.58 MG/DL (ref 0.7–1.2)
DIFFERENTIAL TYPE: ABNORMAL
EOSINOPHILS RELATIVE PERCENT: 3 % (ref 0–4)
GFR AFRICAN AMERICAN: 56 ML/MIN
GFR NON-AFRICAN AMERICAN: 46 ML/MIN
GFR SERPL CREATININE-BSD FRML MDRD: ABNORMAL ML/MIN/{1.73_M2}
GFR SERPL CREATININE-BSD FRML MDRD: ABNORMAL ML/MIN/{1.73_M2}
GLUCOSE BLD-MCNC: 122 MG/DL (ref 70–99)
GLUCOSE BLD-MCNC: 154 MG/DL (ref 75–110)
GLUCOSE BLD-MCNC: 302 MG/DL (ref 75–110)
HCT VFR BLD CALC: 38.4 % (ref 41–53)
HEMOGLOBIN: 12.7 G/DL (ref 13.5–17.5)
IMMATURE GRANULOCYTES: ABNORMAL %
LYMPHOCYTES # BLD: 15 % (ref 24–44)
MCH RBC QN AUTO: 28.2 PG (ref 26–34)
MCHC RBC AUTO-ENTMCNC: 33.1 G/DL (ref 31–37)
MCV RBC AUTO: 85.1 FL (ref 80–100)
MONOCYTES # BLD: 7 % (ref 1–7)
NRBC AUTOMATED: ABNORMAL PER 100 WBC
PDW BLD-RTO: 15.7 % (ref 11.5–14.9)
PLATELET # BLD: 204 K/UL (ref 150–450)
PLATELET ESTIMATE: ABNORMAL
PMV BLD AUTO: 8.9 FL (ref 6–12)
POTASSIUM SERPL-SCNC: 4.1 MMOL/L (ref 3.7–5.3)
RBC # BLD: 4.51 M/UL (ref 4.5–5.9)
RBC # BLD: ABNORMAL 10*6/UL
SEG NEUTROPHILS: 74 % (ref 36–66)
SEGMENTED NEUTROPHILS ABSOLUTE COUNT: 6.3 K/UL (ref 1.3–9.1)
SODIUM BLD-SCNC: 138 MMOL/L (ref 135–144)
WBC # BLD: 8.5 K/UL (ref 3.5–11)
WBC # BLD: ABNORMAL 10*3/UL

## 2018-12-20 PROCEDURE — 99239 HOSP IP/OBS DSCHRG MGMT >30: CPT | Performed by: INTERNAL MEDICINE

## 2018-12-20 PROCEDURE — 6360000002 HC RX W HCPCS: Performed by: STUDENT IN AN ORGANIZED HEALTH CARE EDUCATION/TRAINING PROGRAM

## 2018-12-20 PROCEDURE — 85025 COMPLETE CBC W/AUTO DIFF WBC: CPT

## 2018-12-20 PROCEDURE — 36415 COLL VENOUS BLD VENIPUNCTURE: CPT

## 2018-12-20 PROCEDURE — 6360000002 HC RX W HCPCS: Performed by: INTERNAL MEDICINE

## 2018-12-20 PROCEDURE — 80048 BASIC METABOLIC PNL TOTAL CA: CPT

## 2018-12-20 PROCEDURE — 2580000003 HC RX 258: Performed by: STUDENT IN AN ORGANIZED HEALTH CARE EDUCATION/TRAINING PROGRAM

## 2018-12-20 PROCEDURE — 2580000003 HC RX 258: Performed by: INTERNAL MEDICINE

## 2018-12-20 PROCEDURE — 82947 ASSAY GLUCOSE BLOOD QUANT: CPT

## 2018-12-20 PROCEDURE — 2500000003 HC RX 250 WO HCPCS: Performed by: STUDENT IN AN ORGANIZED HEALTH CARE EDUCATION/TRAINING PROGRAM

## 2018-12-20 PROCEDURE — 6370000000 HC RX 637 (ALT 250 FOR IP): Performed by: STUDENT IN AN ORGANIZED HEALTH CARE EDUCATION/TRAINING PROGRAM

## 2018-12-20 RX ORDER — AMOXICILLIN AND CLAVULANATE POTASSIUM 875; 125 MG/1; MG/1
1 TABLET, FILM COATED ORAL 2 TIMES DAILY
Qty: 20 TABLET | Refills: 0 | Status: SHIPPED | OUTPATIENT
Start: 2018-12-20 | End: 2018-12-30

## 2018-12-20 RX ORDER — DOXYCYCLINE HYCLATE 100 MG/1
100 CAPSULE ORAL 2 TIMES DAILY
Qty: 20 CAPSULE | Refills: 0 | Status: SHIPPED | OUTPATIENT
Start: 2018-12-20 | End: 2018-12-30

## 2018-12-20 RX ADMIN — CLOPIDOGREL BISULFATE 75 MG: 75 TABLET ORAL at 09:23

## 2018-12-20 RX ADMIN — VITAMIN D, TAB 1000IU (100/BT) 1000 UNITS: 25 TAB at 09:23

## 2018-12-20 RX ADMIN — VANCOMYCIN HYDROCHLORIDE 1500 MG: 5 INJECTION, POWDER, LYOPHILIZED, FOR SOLUTION INTRAVENOUS at 04:13

## 2018-12-20 RX ADMIN — OXYCODONE HYDROCHLORIDE 10 MG: 5 TABLET ORAL at 02:22

## 2018-12-20 RX ADMIN — PIPERACILLIN SODIUM,TAZOBACTAM SODIUM 3.38 G: 3; .375 INJECTION, POWDER, FOR SOLUTION INTRAVENOUS at 13:31

## 2018-12-20 RX ADMIN — FLUTICASONE PROPIONATE 2 SPRAY: 50 SPRAY, METERED NASAL at 09:28

## 2018-12-20 RX ADMIN — TIOTROPIUM BROMIDE 18 MCG: 18 CAPSULE ORAL; RESPIRATORY (INHALATION) at 09:28

## 2018-12-20 RX ADMIN — VENLAFAXINE 100 MG: 50 TABLET ORAL at 09:28

## 2018-12-20 RX ADMIN — METOPROLOL TARTRATE 50 MG: 50 TABLET ORAL at 09:24

## 2018-12-20 RX ADMIN — MICONAZOLE NITRATE: 20.6 POWDER TOPICAL at 09:23

## 2018-12-20 RX ADMIN — OXYCODONE HYDROCHLORIDE 10 MG: 5 TABLET ORAL at 10:28

## 2018-12-20 RX ADMIN — BUMETANIDE 3 MG: 1 TABLET ORAL at 09:24

## 2018-12-20 RX ADMIN — LISINOPRIL 5 MG: 5 TABLET ORAL at 09:24

## 2018-12-20 RX ADMIN — ASPIRIN 81 MG 81 MG: 81 TABLET ORAL at 09:23

## 2018-12-20 RX ADMIN — ENOXAPARIN SODIUM 40 MG: 40 INJECTION, SOLUTION INTRAVENOUS; SUBCUTANEOUS at 09:24

## 2018-12-20 RX ADMIN — SPIRONOLACTONE 50 MG: 25 TABLET, FILM COATED ORAL at 09:24

## 2018-12-20 RX ADMIN — METOLAZONE 2.5 MG: 2.5 TABLET ORAL at 09:28

## 2018-12-20 RX ADMIN — INSULIN HUMAN 225 UNITS: 500 INJECTION, SOLUTION SUBCUTANEOUS at 11:30

## 2018-12-20 RX ADMIN — Medication 2 PUFF: at 09:37

## 2018-12-20 ASSESSMENT — PAIN DESCRIPTION - LOCATION: LOCATION: BACK;GROIN

## 2018-12-20 ASSESSMENT — PAIN DESCRIPTION - DESCRIPTORS: DESCRIPTORS: DISCOMFORT

## 2018-12-20 ASSESSMENT — ENCOUNTER SYMPTOMS
BACK PAIN: 1
WHEEZING: 0
SHORTNESS OF BREATH: 0
ABDOMINAL DISTENTION: 1
ABDOMINAL PAIN: 0

## 2018-12-20 ASSESSMENT — PAIN SCALES - WONG BAKER: WONGBAKER_NUMERICALRESPONSE: 0

## 2018-12-20 ASSESSMENT — PAIN DESCRIPTION - ORIENTATION: ORIENTATION: RIGHT

## 2018-12-20 ASSESSMENT — PAIN SCALES - GENERAL
PAINLEVEL_OUTOF10: 0
PAINLEVEL_OUTOF10: 8
PAINLEVEL_OUTOF10: 5
PAINLEVEL_OUTOF10: 8

## 2018-12-20 ASSESSMENT — PAIN DESCRIPTION - PAIN TYPE: TYPE: ACUTE PAIN

## 2018-12-20 NOTE — PROGRESS NOTES
Residents notified of patient's shortness of breath, nausea, possible allergic reaction to initiation of vancomycin. The patient was assessed at bedside patient appears to have a rash covering his entire back. The patient was saturating at 95% on 3 L of oxygen via nasal cannula. The patient says he feels much better now that the vancomycin has been stopped. The patient has used lengthwise in the past with no adverse or allergic reactions to it. We'll give the patient a break from the vancomycin, will give IV Benadryl prior to reinitiating and will administer  The vancomycin to a slower rate over 2 hours. If the reaction occurs again please discontinue vancomycin and will consider doxycycline.

## 2018-12-20 NOTE — DISCHARGE INSTR - COC
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    Tinnitus of both ears H93.13    Slow transit constipation K59.01    Chronic otitis externa of right ear H60.61    Bilateral leg edema R60.0    Tinea pedis of both feet B35.3    Onychomycosis B35.1    MDD (major depressive disorder), recurrent severe, without psychosis ([facility]) F33.2    Dry skin dermatitis legs L85.3    Celiac artery stenosis ([facility]) I77.4    COPD exacerbation ([facility]) J44.1    Acute on chronic respiratory failure ([facility]) J96.20    Myopia H52.10    Nuclear nonsenile cataract H26.9    Presbyopia H52.4    Postlaminectomy syndrome of lumbar region M96.1    Open wound of groin S31.109A    Cellulitis and abscess of trunk L03.319, L02.219       Isolation/Infection:   Isolation          No Isolation            Nurse Assessment:  Last Vital Signs: BP (!) 132/40   Pulse 85   Temp 98.2 °F (36.8 °C) (Oral)   Resp 19   Ht 6' (1.829 m)   Wt (!) 393 lb (178.3 kg)   SpO2 96%   BMI 53.30 kg/m²     Last documented pain score (0-10 scale): Pain Level: 8  Last Weight:   Wt Readings from Last 1 Encounters:   12/19/18 (!) 393 lb (178.3 kg)     Mental Status:  oriented, alert, coherent, logical, thought processes intact and able to concentrate and follow conversation    IV Access:  - None    Nursing Mobility/ADLs:  Walking   Independent  Transfer  Independent  Bathing  Independent  Dressing  Independent  Toileting  Independent  Feeding  Independent  Med Admin  Independent  Med Delivery   none    Wound Care Documentation and Therapy:  Wound 12/19/18 Groin Right (Active)   Wound Other 12/19/2018  9:00 PM   Dressing Status Clean;Dry; Intact 12/20/2018  9:00 AM   Dressing Changed Changed/New 12/19/2018  9:00 PM Dressing/Treatment Dry dressing 12/20/2018  9:00 AM   Wound Cleansed Rinsed/Irrigated with saline 12/19/2018  9:00 PM   Dressing Change Due 12/20/18 12/20/2018  9:00 AM   Wound Assessment ADRIANE 12/20/2018  9:00 AM   Drainage Amount Moderate 12/19/2018  9:00 PM   Drainage Description Giuliana Leach; [place] 12/19/2018  9:00 PM   Odor Strong 12/19/2018  9:00 PM   Culture Taken Yes 12/19/2018  9:00 PM   Number of days: 0        Elimination:  Continence:   · Bowel: Yes  · Bladder: Yes  Urinary Catheter: None   Colostomy/Ileostomy/Ileal Conduit: No       Date of Last BM: ***    Intake/Output Summary (Last 24 hours) at 12/20/18 1242  Last data filed at 12/20/18 0413   Gross per 24 hour   Intake              300 ml   Output                0 ml   Net              300 ml     I/O last 3 completed shifts: In: 300 [IV Piggyback:300]  Out: -     Safety Concerns:     None    Impairments/Disabilities:      None    Nutrition Therapy:  Current Nutrition Therapy:   - Oral Diet:  Carb Control 3 carbs/meal (1500kcals/day)    Routes of Feeding: Oral  Liquids: No Restrictions  Daily Fluid Restriction: yes - amount 2l  Last Modified Barium Swallow with Video (Video Swallowing Test): not done    Treatments at the Time of Hospital Discharge:   Respiratory Treatments: ***  Oxygen Therapy:  is not on home oxygen therapy.   Ventilator:    - No ventilator support    Rehab Therapies: ***  Weight Bearing Status/Restrictions: No weight bearing restirctions  Other Medical Equipment (for information only, NOT a DME order):  ***  Other Treatments: ***    Patient's personal belongings (please select all that are sent with patient):  None    RN SIGNATURE:  {Esignature:[id number]}    CASE MANAGEMENT/SOCIAL WORK SECTION    Inpatient Status Date: ***    Readmission Risk Assessment Score:  Readmission Risk              Risk of Unplanned Readmission:        33           Discharging to Facility/ Agency   · Name:   · Address:  · Phone:  · Fax:    Dialysis Facility (if applicable)   · Name:  · Address:  · Dialysis Schedule:  · Phone:  · Fax:    / signature: {Esignature:[id number]}    PHYSICIAN SECTION    Prognosis: Fair    Condition at Discharge: Stable    Rehab Potential (if transferring to Rehab): {Prognosis:[id number]}    Recommended Labs or Other Treatments After Discharge: nursing visits for wound care and dressing change    Physician Certification: I certify the above information and transfer of Sin Garrett  is necessary for the continuing treatment of the diagnosis listed and that he requires [address] for less 30 days.      Update Admission H&P: No change in H&P    PHYSICIAN SIGNATURE:  {Esignature:[id number]}

## 2018-12-20 NOTE — PROGRESS NOTES
Vancomycin Day: 2    Srcr elevated to 1.58 mg/dl, has been on vancomycin 1500mg IV every 12 hrs in past(July 2017). Will decrease to that dose now, monitor srcr. Monitor blood and anaerobic cultures. Is also on Zosyn. Trough level ordered for tomorrow. Target trough 12-15 mcg/ml. Rebekah Hatfield. [facility]

## 2018-12-20 NOTE — PROGRESS NOTES
Current Meds:   Scheduled Meds:    piperacillin-tazobactam  3.375 g Intravenous Q6H    vancomycin  1,500 mg Intravenous Q12H    sodium chloride flush  10 mL Intravenous 2 times per day    enoxaparin  40 mg Subcutaneous BID    aspirin  81 mg Oral Daily    clopidogrel  75 mg Oral Daily    fluticasone  2 spray Nasal Daily    mometasone-formoterol  2 puff Inhalation BID    insulin regular human  225 Units Subcutaneous Daily with breakfast    lisinopril  5 mg Oral Daily    metolazone  2.5 mg Oral Once per day on Mon Thu    metoprolol tartrate  50 mg Oral BID    nicotine  1 patch Transdermal Q24H    pantoprazole  40 mg Oral Nightly    pravastatin  40 mg Oral Nightly    spironolactone  50 mg Oral Daily    tiotropium  18 mcg Inhalation Daily    venlafaxine  100 mg Oral BID    vitamin D  1 tablet Oral Daily    bumetanide  3 mg Oral BID    insulin regular human  225 Units Subcutaneous Lunch    insulin regular human  175 Units Subcutaneous Dinner    miconazole   Topical BID    vancomycin (VANCOCIN) intermittent dosing (placeholder)   Other RX Placeholder     Continuous Infusions:    dextrose       PRN Meds: sodium chloride flush, magnesium hydroxide, ondansetron, potassium chloride, magnesium sulfate, albuterol, melatonin ER, nitroGLYCERIN, albuterol sulfate HFA, tiZANidine, oxyCODONE HCl, glucose, dextrose, glucagon (rDNA), dextrose, diphenhydrAMINE    Data:     Past Medical History:   has a past medical history of Acute on chronic kidney failure [facility] Adhesive capsulitis of left shoulder; Anxiety; Arthropathy, unspecified, other specified sites; Asthma; Bilateral lower leg cellulitis; Bilateral lower leg cellulitis; Bilateral lower leg cellulitis; Blood in stool; CAD (coronary artery disease); Cellulitis of both lower extremities; CHF (congestive heart failure), NYHA class III ([facility]  Chronic back pain; Chronic headaches; Chronic kidney disease; COPD (chronic obstructive pulmonary disease) am. Wound cx mixed bacterial morphotypes on gram stain so far. on home meds for HTN, CHF, CAD and DM. COPD, aerosols,  resp care and eval.   mik on bipap       Joshua Case MD  12/20/2018  10:23 AM     Attending Physician Statement  Patient seen and examined  I have discussed the care of the patient, including pertinent history and exam findings,  with the resident. I have reviewed the key elements of all parts of the encounter with the resident. I agree with the assessment, plan and orders as documented by the resident.     Sheryl Suh

## 2018-12-24 LAB
CULTURE: ABNORMAL
CULTURE: ABNORMAL
DIRECT EXAM: ABNORMAL
DIRECT EXAM: ABNORMAL
Lab: ABNORMAL
SPECIMEN DESCRIPTION: ABNORMAL
STATUS: ABNORMAL

## 2018-12-26 ENCOUNTER — PATIENT MESSAGE (OUTPATIENT)
Dept: FAMILY MEDICINE CLINIC | Age: 53
End: 2018-12-26

## 2018-12-26 LAB
CULTURE: NORMAL
CULTURE: NORMAL
Lab: NORMAL
Lab: NORMAL
SPECIMEN DESCRIPTION: NORMAL
SPECIMEN DESCRIPTION: NORMAL
STATUS: NORMAL
STATUS: NORMAL

## 2019-01-09 ENCOUNTER — PATIENT MESSAGE (OUTPATIENT)
Dept: FAMILY MEDICINE CLINIC | Age: 54
End: 2019-01-09

## 2019-01-09 DIAGNOSIS — G47.01 INSOMNIA DUE TO MEDICAL CONDITION: ICD-10-CM

## 2019-01-09 DIAGNOSIS — N18.30 CKD (CHRONIC KIDNEY DISEASE) STAGE 3, GFR 30-59 ML/MIN (HCC): ICD-10-CM

## 2019-01-09 DIAGNOSIS — I50.32 CHRONIC DIASTOLIC CONGESTIVE HEART FAILURE (HCC): ICD-10-CM

## 2019-01-09 DIAGNOSIS — M54.42 CHRONIC MIDLINE LOW BACK PAIN WITH LEFT-SIDED SCIATICA: ICD-10-CM

## 2019-01-09 DIAGNOSIS — M51.36 DDD (DEGENERATIVE DISC DISEASE), LUMBAR: ICD-10-CM

## 2019-01-09 DIAGNOSIS — G89.29 CHRONIC MIDLINE LOW BACK PAIN WITH LEFT-SIDED SCIATICA: ICD-10-CM

## 2019-01-09 DIAGNOSIS — M96.1 POSTLAMINECTOMY SYNDROME OF LUMBAR REGION: ICD-10-CM

## 2019-01-09 DIAGNOSIS — M48.061 SPINAL STENOSIS OF LUMBAR REGION WITHOUT NEUROGENIC CLAUDICATION: ICD-10-CM

## 2019-01-09 RX ORDER — OXYCODONE HYDROCHLORIDE 10 MG/1
10 TABLET ORAL EVERY 8 HOURS PRN
Qty: 90 TABLET | Refills: 0 | Status: SHIPPED | OUTPATIENT
Start: 2019-01-09 | End: 2019-02-06 | Stop reason: SDUPTHER

## 2019-01-10 ENCOUNTER — HOSPITAL ENCOUNTER (OUTPATIENT)
Dept: OTHER | Age: 54
Discharge: HOME | End: 2019-01-10
Payer: COMMERCIAL

## 2019-01-10 VITALS
SYSTOLIC BLOOD PRESSURE: 126 MMHG | HEART RATE: 84 BPM | OXYGEN SATURATION: 98 % | RESPIRATION RATE: 20 BRPM | BODY MASS INDEX: 53.84 KG/M2 | DIASTOLIC BLOOD PRESSURE: 70 MMHG | WEIGHT: 315 LBS

## 2019-01-10 LAB
ANION GAP SERPL CALCULATED.3IONS-SCNC: 12 MMOL/L (ref 9–17)
BNP INTERPRETATION: NORMAL
BUN BLDV-MCNC: 31 MG/DL (ref 6–20)
CHLORIDE BLD-SCNC: 94 MMOL/L (ref 98–107)
CO2: 29 MMOL/L (ref 20–31)
CREAT SERPL-MCNC: 1.47 MG/DL (ref 0.7–1.2)
GFR AFRICAN AMERICAN: >60 ML/MIN
GFR NON-AFRICAN AMERICAN: 50 ML/MIN
GFR SERPL CREATININE-BSD FRML MDRD: ABNORMAL ML/MIN/{1.73_M2}
GFR SERPL CREATININE-BSD FRML MDRD: ABNORMAL ML/MIN/{1.73_M2}
POTASSIUM SERPL-SCNC: 5 MMOL/L (ref 3.7–5.3)
PRO-BNP: 47 PG/ML
SODIUM BLD-SCNC: 135 MMOL/L (ref 135–144)

## 2019-01-10 PROCEDURE — 83880 ASSAY OF NATRIURETIC PEPTIDE: CPT

## 2019-01-10 PROCEDURE — 80051 ELECTROLYTE PANEL: CPT

## 2019-01-10 PROCEDURE — 82565 ASSAY OF CREATININE: CPT

## 2019-01-10 PROCEDURE — 36415 COLL VENOUS BLD VENIPUNCTURE: CPT

## 2019-01-10 PROCEDURE — 99211 OFF/OP EST MAY X REQ PHY/QHP: CPT

## 2019-01-10 PROCEDURE — 84520 ASSAY OF UREA NITROGEN: CPT

## 2019-01-18 ENCOUNTER — HOSPITAL ENCOUNTER (OUTPATIENT)
Age: 54
Discharge: HOME | End: 2019-01-18
Payer: COMMERCIAL

## 2019-01-18 DIAGNOSIS — E13.22 SECONDARY DIABETES MELLITUS WITH STAGE 3 CHRONIC KIDNEY DISEASE (HCC): ICD-10-CM

## 2019-01-18 DIAGNOSIS — N18.30 CKD (CHRONIC KIDNEY DISEASE), STAGE III (HCC): ICD-10-CM

## 2019-01-18 DIAGNOSIS — N18.30 BENIGN HYPERTENSION WITH CKD (CHRONIC KIDNEY DISEASE) STAGE III (HCC): ICD-10-CM

## 2019-01-18 DIAGNOSIS — N18.30 SECONDARY DIABETES MELLITUS WITH STAGE 3 CHRONIC KIDNEY DISEASE (HCC): ICD-10-CM

## 2019-01-18 DIAGNOSIS — I12.9 BENIGN HYPERTENSION WITH CKD (CHRONIC KIDNEY DISEASE) STAGE III (HCC): ICD-10-CM

## 2019-01-18 LAB
ABSOLUTE EOS #: 0.3 K/UL (ref 0–0.4)
ABSOLUTE IMMATURE GRANULOCYTE: ABNORMAL K/UL (ref 0–0.3)
ABSOLUTE LYMPH #: 1.3 K/UL (ref 1–4.8)
ABSOLUTE MONO #: 0.8 K/UL (ref 0.1–1.3)
ANION GAP SERPL CALCULATED.3IONS-SCNC: 14 MMOL/L (ref 9–17)
AVERAGE GLUCOSE: NORMAL
BASOPHILS # BLD: 1 % (ref 0–2)
BASOPHILS ABSOLUTE: 0.1 K/UL (ref 0–0.2)
BUN BLDV-MCNC: 38 MG/DL (ref 6–20)
BUN/CREAT BLD: ABNORMAL (ref 9–20)
CALCIUM SERPL-MCNC: 9.9 MG/DL (ref 8.6–10.4)
CHLORIDE BLD-SCNC: 99 MMOL/L (ref 98–107)
CO2: 26 MMOL/L (ref 20–31)
CREAT SERPL-MCNC: 1.47 MG/DL (ref 0.7–1.2)
DIFFERENTIAL TYPE: ABNORMAL
EOSINOPHILS RELATIVE PERCENT: 3 % (ref 0–4)
GFR AFRICAN AMERICAN: >60 ML/MIN
GFR NON-AFRICAN AMERICAN: 50 ML/MIN
GFR SERPL CREATININE-BSD FRML MDRD: ABNORMAL ML/MIN/{1.73_M2}
GFR SERPL CREATININE-BSD FRML MDRD: ABNORMAL ML/MIN/{1.73_M2}
GLUCOSE BLD-MCNC: 212 MG/DL (ref 70–99)
HBA1C MFR BLD: 10.7 %
HCT VFR BLD CALC: 38.9 % (ref 41–53)
HEMOGLOBIN: 12.8 G/DL (ref 13.5–17.5)
IMMATURE GRANULOCYTES: ABNORMAL %
LYMPHOCYTES # BLD: 14 % (ref 24–44)
MAGNESIUM: 1.9 MG/DL (ref 1.6–2.6)
MCH RBC QN AUTO: 28.1 PG (ref 26–34)
MCHC RBC AUTO-ENTMCNC: 32.8 G/DL (ref 31–37)
MCV RBC AUTO: 85.7 FL (ref 80–100)
MONOCYTES # BLD: 9 % (ref 1–7)
NRBC AUTOMATED: ABNORMAL PER 100 WBC
PDW BLD-RTO: 15.6 % (ref 11.5–14.9)
PHOSPHORUS: 3.9 MG/DL (ref 2.5–4.5)
PLATELET # BLD: 207 K/UL (ref 150–450)
PLATELET ESTIMATE: ABNORMAL
PMV BLD AUTO: 9.2 FL (ref 6–12)
POTASSIUM SERPL-SCNC: 4.7 MMOL/L (ref 3.7–5.3)
RBC # BLD: 4.54 M/UL (ref 4.5–5.9)
RBC # BLD: ABNORMAL 10*6/UL
SEG NEUTROPHILS: 73 % (ref 36–66)
SEGMENTED NEUTROPHILS ABSOLUTE COUNT: 6.4 K/UL (ref 1.3–9.1)
SODIUM BLD-SCNC: 139 MMOL/L (ref 135–144)
WBC # BLD: 8.8 K/UL (ref 3.5–11)
WBC # BLD: ABNORMAL 10*3/UL

## 2019-01-18 PROCEDURE — 80048 BASIC METABOLIC PNL TOTAL CA: CPT

## 2019-01-18 PROCEDURE — 83735 ASSAY OF MAGNESIUM: CPT

## 2019-01-18 PROCEDURE — 84100 ASSAY OF PHOSPHORUS: CPT

## 2019-01-18 PROCEDURE — 36415 COLL VENOUS BLD VENIPUNCTURE: CPT

## 2019-01-18 PROCEDURE — 85025 COMPLETE CBC W/AUTO DIFF WBC: CPT

## 2019-01-21 DIAGNOSIS — K21.9 GASTROESOPHAGEAL REFLUX DISEASE WITHOUT ESOPHAGITIS: ICD-10-CM

## 2019-01-21 RX ORDER — PANTOPRAZOLE SODIUM 40 MG/1
40 TABLET, DELAYED RELEASE ORAL NIGHTLY
Qty: 90 TABLET | Refills: 3 | Status: SHIPPED | OUTPATIENT
Start: 2019-01-21 | End: 2020-01-14 | Stop reason: SDUPTHER

## 2019-01-23 DIAGNOSIS — G89.29 CHRONIC BACK PAIN GREATER THAN 3 MONTHS DURATION: ICD-10-CM

## 2019-01-23 DIAGNOSIS — M54.9 CHRONIC BACK PAIN GREATER THAN 3 MONTHS DURATION: ICD-10-CM

## 2019-01-23 RX ORDER — TIZANIDINE 4 MG/1
TABLET ORAL
Qty: 90 TABLET | Refills: 5 | Status: SHIPPED | OUTPATIENT
Start: 2019-01-23 | End: 2019-10-10 | Stop reason: SDUPTHER

## 2019-01-28 ENCOUNTER — PATIENT MESSAGE (OUTPATIENT)
Dept: FAMILY MEDICINE CLINIC | Age: 54
End: 2019-01-28

## 2019-01-28 DIAGNOSIS — E11.40 TYPE 2 DIABETES MELLITUS WITH DIABETIC NEUROPATHY, WITH LONG-TERM CURRENT USE OF INSULIN (HCC): Primary | ICD-10-CM

## 2019-01-28 DIAGNOSIS — Z79.4 TYPE 2 DIABETES MELLITUS WITH DIABETIC NEUROPATHY, WITH LONG-TERM CURRENT USE OF INSULIN (HCC): Primary | ICD-10-CM

## 2019-01-28 RX ORDER — GABAPENTIN 100 MG/1
100 CAPSULE ORAL 3 TIMES DAILY
Qty: 21 CAPSULE | Refills: 0 | Status: SHIPPED | OUTPATIENT
Start: 2019-01-28 | End: 2019-02-04

## 2019-02-06 ENCOUNTER — PATIENT MESSAGE (OUTPATIENT)
Dept: FAMILY MEDICINE CLINIC | Age: 54
End: 2019-02-06

## 2019-02-06 DIAGNOSIS — M54.42 CHRONIC MIDLINE LOW BACK PAIN WITH LEFT-SIDED SCIATICA: ICD-10-CM

## 2019-02-06 DIAGNOSIS — M48.061 SPINAL STENOSIS OF LUMBAR REGION WITHOUT NEUROGENIC CLAUDICATION: ICD-10-CM

## 2019-02-06 DIAGNOSIS — M51.36 DDD (DEGENERATIVE DISC DISEASE), LUMBAR: ICD-10-CM

## 2019-02-06 DIAGNOSIS — G89.29 CHRONIC MIDLINE LOW BACK PAIN WITH LEFT-SIDED SCIATICA: ICD-10-CM

## 2019-02-06 DIAGNOSIS — G47.01 INSOMNIA DUE TO MEDICAL CONDITION: ICD-10-CM

## 2019-02-06 DIAGNOSIS — M96.1 POSTLAMINECTOMY SYNDROME OF LUMBAR REGION: ICD-10-CM

## 2019-02-06 DIAGNOSIS — N18.30 CKD (CHRONIC KIDNEY DISEASE) STAGE 3, GFR 30-59 ML/MIN (HCC): ICD-10-CM

## 2019-02-06 DIAGNOSIS — I50.32 CHRONIC DIASTOLIC CONGESTIVE HEART FAILURE (HCC): ICD-10-CM

## 2019-02-07 ENCOUNTER — HOSPITAL ENCOUNTER (OUTPATIENT)
Dept: OTHER | Age: 54
Discharge: HOME | End: 2019-02-07
Payer: COMMERCIAL

## 2019-02-07 ENCOUNTER — PATIENT MESSAGE (OUTPATIENT)
Dept: FAMILY MEDICINE CLINIC | Age: 54
End: 2019-02-07

## 2019-02-07 VITALS
WEIGHT: 315 LBS | SYSTOLIC BLOOD PRESSURE: 160 MMHG | DIASTOLIC BLOOD PRESSURE: 70 MMHG | HEART RATE: 118 BPM | BODY MASS INDEX: 55.63 KG/M2 | RESPIRATION RATE: 24 BRPM | OXYGEN SATURATION: 99 %

## 2019-02-07 DIAGNOSIS — E11.40 TYPE 2 DIABETES MELLITUS WITH DIABETIC NEUROPATHY, WITH LONG-TERM CURRENT USE OF INSULIN (HCC): Primary | ICD-10-CM

## 2019-02-07 DIAGNOSIS — Z79.4 TYPE 2 DIABETES MELLITUS WITH DIABETIC NEUROPATHY, WITH LONG-TERM CURRENT USE OF INSULIN (HCC): Primary | ICD-10-CM

## 2019-02-07 LAB
ANION GAP SERPL CALCULATED.3IONS-SCNC: 12 MMOL/L (ref 9–17)
BNP INTERPRETATION: NORMAL
BUN BLDV-MCNC: 26 MG/DL (ref 6–20)
CHLORIDE BLD-SCNC: 101 MMOL/L (ref 98–107)
CO2: 24 MMOL/L (ref 20–31)
CREAT SERPL-MCNC: 1.41 MG/DL (ref 0.7–1.2)
GFR AFRICAN AMERICAN: >60 ML/MIN
GFR NON-AFRICAN AMERICAN: 52 ML/MIN
GFR SERPL CREATININE-BSD FRML MDRD: ABNORMAL ML/MIN/{1.73_M2}
GFR SERPL CREATININE-BSD FRML MDRD: ABNORMAL ML/MIN/{1.73_M2}
POTASSIUM SERPL-SCNC: 4.2 MMOL/L (ref 3.7–5.3)
PRO-BNP: 179 PG/ML
SODIUM BLD-SCNC: 137 MMOL/L (ref 135–144)

## 2019-02-07 PROCEDURE — 82565 ASSAY OF CREATININE: CPT

## 2019-02-07 PROCEDURE — 80051 ELECTROLYTE PANEL: CPT

## 2019-02-07 PROCEDURE — 36415 COLL VENOUS BLD VENIPUNCTURE: CPT

## 2019-02-07 PROCEDURE — 84520 ASSAY OF UREA NITROGEN: CPT

## 2019-02-07 PROCEDURE — 99211 OFF/OP EST MAY X REQ PHY/QHP: CPT

## 2019-02-07 PROCEDURE — 83880 ASSAY OF NATRIURETIC PEPTIDE: CPT

## 2019-02-07 RX ORDER — GABAPENTIN 300 MG/1
300 CAPSULE ORAL 3 TIMES DAILY
Qty: 90 CAPSULE | Refills: 0 | Status: SHIPPED | OUTPATIENT
Start: 2019-02-07 | End: 2019-03-28

## 2019-02-07 RX ORDER — GABAPENTIN 100 MG/1
100 CAPSULE ORAL 3 TIMES DAILY
COMMUNITY
End: 2019-02-07 | Stop reason: DRUGHIGH

## 2019-02-07 RX ORDER — OXYCODONE HYDROCHLORIDE 10 MG/1
10 TABLET ORAL EVERY 8 HOURS PRN
Qty: 90 TABLET | Refills: 0 | Status: SHIPPED | OUTPATIENT
Start: 2019-02-07 | End: 2019-03-05 | Stop reason: SDUPTHER

## 2019-02-11 ENCOUNTER — HOSPITAL ENCOUNTER (OUTPATIENT)
Dept: OTHER | Age: 54
Discharge: HOME | End: 2019-02-11
Payer: COMMERCIAL

## 2019-02-11 VITALS
WEIGHT: 315 LBS | BODY MASS INDEX: 54.37 KG/M2 | RESPIRATION RATE: 24 BRPM | DIASTOLIC BLOOD PRESSURE: 70 MMHG | OXYGEN SATURATION: 95 % | SYSTOLIC BLOOD PRESSURE: 130 MMHG | HEART RATE: 104 BPM

## 2019-02-11 LAB
ANION GAP SERPL CALCULATED.3IONS-SCNC: 17 MMOL/L (ref 9–17)
BUN BLDV-MCNC: 47 MG/DL (ref 6–20)
CHLORIDE BLD-SCNC: 92 MMOL/L (ref 98–107)
CO2: 27 MMOL/L (ref 20–31)
CREAT SERPL-MCNC: 1.9 MG/DL (ref 0.7–1.2)
GFR AFRICAN AMERICAN: 45 ML/MIN
GFR NON-AFRICAN AMERICAN: 37 ML/MIN
GFR SERPL CREATININE-BSD FRML MDRD: ABNORMAL ML/MIN/{1.73_M2}
GFR SERPL CREATININE-BSD FRML MDRD: ABNORMAL ML/MIN/{1.73_M2}
POTASSIUM SERPL-SCNC: 4.9 MMOL/L (ref 3.7–5.3)
SODIUM BLD-SCNC: 136 MMOL/L (ref 135–144)

## 2019-02-11 PROCEDURE — 36415 COLL VENOUS BLD VENIPUNCTURE: CPT

## 2019-02-11 PROCEDURE — 99211 OFF/OP EST MAY X REQ PHY/QHP: CPT

## 2019-02-11 PROCEDURE — 82565 ASSAY OF CREATININE: CPT

## 2019-02-11 PROCEDURE — 84520 ASSAY OF UREA NITROGEN: CPT

## 2019-02-11 PROCEDURE — 80051 ELECTROLYTE PANEL: CPT

## 2019-02-13 ENCOUNTER — PATIENT MESSAGE (OUTPATIENT)
Dept: FAMILY MEDICINE CLINIC | Age: 54
End: 2019-02-13

## 2019-02-13 DIAGNOSIS — M54.9 CHRONIC BACK PAIN GREATER THAN 3 MONTHS DURATION: ICD-10-CM

## 2019-02-13 DIAGNOSIS — G89.29 CHRONIC BACK PAIN GREATER THAN 3 MONTHS DURATION: ICD-10-CM

## 2019-02-13 DIAGNOSIS — J44.9 MIXED TYPE COPD (CHRONIC OBSTRUCTIVE PULMONARY DISEASE) (HCC): Primary | ICD-10-CM

## 2019-02-13 DIAGNOSIS — I50.32 CHRONIC DIASTOLIC CONGESTIVE HEART FAILURE (HCC): ICD-10-CM

## 2019-02-13 PROBLEM — J44.1 COPD EXACERBATION (HCC): Status: RESOLVED | Noted: 2018-10-02 | Resolved: 2019-02-13

## 2019-02-18 DIAGNOSIS — G47.01 INSOMNIA DUE TO MEDICAL CONDITION: ICD-10-CM

## 2019-02-18 DIAGNOSIS — G89.29 CHRONIC BACK PAIN GREATER THAN 3 MONTHS DURATION: ICD-10-CM

## 2019-02-18 DIAGNOSIS — F41.9 ANXIETY: ICD-10-CM

## 2019-02-18 DIAGNOSIS — F33.1 MODERATE EPISODE OF RECURRENT MAJOR DEPRESSIVE DISORDER (HCC): ICD-10-CM

## 2019-02-18 DIAGNOSIS — M54.9 CHRONIC BACK PAIN GREATER THAN 3 MONTHS DURATION: ICD-10-CM

## 2019-02-18 RX ORDER — VENLAFAXINE 100 MG/1
100 TABLET ORAL 2 TIMES DAILY
Qty: 60 TABLET | Refills: 3 | Status: SHIPPED | OUTPATIENT
Start: 2019-02-18 | End: 2019-06-19 | Stop reason: ALTCHOICE

## 2019-02-20 ENCOUNTER — HOSPITAL ENCOUNTER (OUTPATIENT)
Dept: OTHER | Age: 54
Discharge: HOME | End: 2019-02-20
Payer: COMMERCIAL

## 2019-02-20 VITALS
WEIGHT: 315 LBS | OXYGEN SATURATION: 97 % | HEART RATE: 106 BPM | DIASTOLIC BLOOD PRESSURE: 70 MMHG | RESPIRATION RATE: 24 BRPM | SYSTOLIC BLOOD PRESSURE: 130 MMHG | BODY MASS INDEX: 53.79 KG/M2

## 2019-02-20 LAB
ANION GAP SERPL CALCULATED.3IONS-SCNC: 14 MMOL/L (ref 9–17)
BUN BLDV-MCNC: 47 MG/DL (ref 6–20)
CHLORIDE BLD-SCNC: 92 MMOL/L (ref 98–107)
CO2: 27 MMOL/L (ref 20–31)
CREAT SERPL-MCNC: 1.88 MG/DL (ref 0.7–1.2)
GFR AFRICAN AMERICAN: 46 ML/MIN
GFR NON-AFRICAN AMERICAN: 38 ML/MIN
GFR SERPL CREATININE-BSD FRML MDRD: ABNORMAL ML/MIN/{1.73_M2}
GFR SERPL CREATININE-BSD FRML MDRD: ABNORMAL ML/MIN/{1.73_M2}
POTASSIUM SERPL-SCNC: 4.3 MMOL/L (ref 3.7–5.3)
SODIUM BLD-SCNC: 133 MMOL/L (ref 135–144)

## 2019-02-20 PROCEDURE — 82565 ASSAY OF CREATININE: CPT

## 2019-02-20 PROCEDURE — 36415 COLL VENOUS BLD VENIPUNCTURE: CPT

## 2019-02-20 PROCEDURE — 80051 ELECTROLYTE PANEL: CPT

## 2019-02-20 PROCEDURE — 99211 OFF/OP EST MAY X REQ PHY/QHP: CPT

## 2019-02-20 PROCEDURE — 84520 ASSAY OF UREA NITROGEN: CPT

## 2019-02-22 ENCOUNTER — HOSPITAL ENCOUNTER (OUTPATIENT)
Age: 54
Discharge: HOME | End: 2019-02-22
Payer: COMMERCIAL

## 2019-02-22 ENCOUNTER — OFFICE VISIT (OUTPATIENT)
Dept: FAMILY MEDICINE CLINIC | Age: 54
End: 2019-02-22
Payer: COMMERCIAL

## 2019-02-22 ENCOUNTER — HOSPITAL ENCOUNTER (OUTPATIENT)
Dept: WOUND CARE | Age: 54
Discharge: HOME | End: 2019-02-22
Payer: COMMERCIAL

## 2019-02-22 VITALS
HEIGHT: 72 IN | BODY MASS INDEX: 42.66 KG/M2 | SYSTOLIC BLOOD PRESSURE: 136 MMHG | WEIGHT: 315 LBS | DIASTOLIC BLOOD PRESSURE: 63 MMHG | RESPIRATION RATE: 24 BRPM | TEMPERATURE: 98.7 F | HEART RATE: 96 BPM

## 2019-02-22 VITALS
BODY MASS INDEX: 42.66 KG/M2 | DIASTOLIC BLOOD PRESSURE: 78 MMHG | WEIGHT: 315 LBS | OXYGEN SATURATION: 99 % | HEART RATE: 101 BPM | SYSTOLIC BLOOD PRESSURE: 136 MMHG | HEIGHT: 72 IN

## 2019-02-22 DIAGNOSIS — F33.1 MODERATE RECURRENT MAJOR DEPRESSION (HCC): ICD-10-CM

## 2019-02-22 DIAGNOSIS — E11.42 TYPE 2 DIABETES MELLITUS WITH DIABETIC POLYNEUROPATHY, WITH LONG-TERM CURRENT USE OF INSULIN (HCC): ICD-10-CM

## 2019-02-22 DIAGNOSIS — J44.9 MIXED TYPE COPD (CHRONIC OBSTRUCTIVE PULMONARY DISEASE) (HCC): ICD-10-CM

## 2019-02-22 DIAGNOSIS — L03.116 CELLULITIS OF LEFT LOWER EXTREMITY: ICD-10-CM

## 2019-02-22 DIAGNOSIS — E78.5 HYPERLIPIDEMIA WITH TARGET LDL LESS THAN 70: ICD-10-CM

## 2019-02-22 DIAGNOSIS — M51.36 DDD (DEGENERATIVE DISC DISEASE), LUMBAR: ICD-10-CM

## 2019-02-22 DIAGNOSIS — L97.922 CHRONIC ULCER OF LEFT LEG, WITH FAT LAYER EXPOSED (HCC): ICD-10-CM

## 2019-02-22 DIAGNOSIS — I10 ESSENTIAL HYPERTENSION: ICD-10-CM

## 2019-02-22 DIAGNOSIS — I87.2 STASIS DERMATITIS OF BOTH LEGS: ICD-10-CM

## 2019-02-22 DIAGNOSIS — G47.33 OSA TREATED WITH BIPAP: ICD-10-CM

## 2019-02-22 DIAGNOSIS — Z23 NEED FOR PROPHYLACTIC VACCINATION AND INOCULATION AGAINST VARICELLA: ICD-10-CM

## 2019-02-22 DIAGNOSIS — N18.30 CKD (CHRONIC KIDNEY DISEASE) STAGE 3, GFR 30-59 ML/MIN (HCC): ICD-10-CM

## 2019-02-22 DIAGNOSIS — Z79.4 TYPE 2 DIABETES MELLITUS WITH DIABETIC POLYNEUROPATHY, WITH LONG-TERM CURRENT USE OF INSULIN (HCC): ICD-10-CM

## 2019-02-22 DIAGNOSIS — S81.802A WOUND OF LEFT LOWER EXTREMITY, INITIAL ENCOUNTER: Primary | ICD-10-CM

## 2019-02-22 DIAGNOSIS — G89.29 CHRONIC BACK PAIN GREATER THAN 3 MONTHS DURATION: ICD-10-CM

## 2019-02-22 DIAGNOSIS — I50.32 CHRONIC DIASTOLIC CONGESTIVE HEART FAILURE (HCC): ICD-10-CM

## 2019-02-22 DIAGNOSIS — M54.9 CHRONIC BACK PAIN GREATER THAN 3 MONTHS DURATION: ICD-10-CM

## 2019-02-22 DIAGNOSIS — M89.9 BONE DISORDER: ICD-10-CM

## 2019-02-22 DIAGNOSIS — S81.802A WOUND OF LEFT LOWER EXTREMITY, INITIAL ENCOUNTER: ICD-10-CM

## 2019-02-22 DIAGNOSIS — E87.1 HYPONATREMIA: Primary | ICD-10-CM

## 2019-02-22 DIAGNOSIS — L03.116 CELLULITIS OF LEG, LEFT: Primary | ICD-10-CM

## 2019-02-22 DIAGNOSIS — Z12.11 SCREENING FOR COLON CANCER: ICD-10-CM

## 2019-02-22 DIAGNOSIS — E66.01 MORBID OBESITY WITH BMI OF 50.0-59.9, ADULT (HCC): ICD-10-CM

## 2019-02-22 DIAGNOSIS — I87.2 VENOUS INSUFFICIENCY OF LEFT LOWER EXTREMITY: ICD-10-CM

## 2019-02-22 PROBLEM — L03.319 CELLULITIS AND ABSCESS OF TRUNK: Status: RESOLVED | Noted: 2018-12-19 | Resolved: 2019-02-22

## 2019-02-22 PROBLEM — S31.109A OPEN WOUND OF GROIN: Status: RESOLVED | Noted: 2018-12-19 | Resolved: 2019-02-22

## 2019-02-22 PROBLEM — L02.219 CELLULITIS AND ABSCESS OF TRUNK: Status: RESOLVED | Noted: 2018-12-19 | Resolved: 2019-02-22

## 2019-02-22 PROBLEM — J96.20 ACUTE ON CHRONIC RESPIRATORY FAILURE (HCC): Status: RESOLVED | Noted: 2018-10-02 | Resolved: 2019-02-22

## 2019-02-22 LAB
ABSOLUTE EOS #: 0.2 K/UL (ref 0–0.4)
ABSOLUTE IMMATURE GRANULOCYTE: ABNORMAL K/UL (ref 0–0.3)
ABSOLUTE LYMPH #: 1.4 K/UL (ref 1–4.8)
ABSOLUTE MONO #: 0.7 K/UL (ref 0.1–1.3)
ANION GAP SERPL CALCULATED.3IONS-SCNC: 15 MMOL/L (ref 9–17)
BASOPHILS # BLD: 1 % (ref 0–2)
BASOPHILS ABSOLUTE: 0.1 K/UL (ref 0–0.2)
BUN BLDV-MCNC: 52 MG/DL (ref 6–20)
BUN/CREAT BLD: ABNORMAL (ref 9–20)
CALCIUM SERPL-MCNC: 9.8 MG/DL (ref 8.6–10.4)
CHLORIDE BLD-SCNC: 90 MMOL/L (ref 98–107)
CO2: 27 MMOL/L (ref 20–31)
CREAT SERPL-MCNC: 1.95 MG/DL (ref 0.7–1.2)
DIFFERENTIAL TYPE: ABNORMAL
EOSINOPHILS RELATIVE PERCENT: 2 % (ref 0–4)
FOLATE: 18.6 NG/ML
GFR AFRICAN AMERICAN: 44 ML/MIN
GFR NON-AFRICAN AMERICAN: 36 ML/MIN
GFR SERPL CREATININE-BSD FRML MDRD: ABNORMAL ML/MIN/{1.73_M2}
GFR SERPL CREATININE-BSD FRML MDRD: ABNORMAL ML/MIN/{1.73_M2}
GLUCOSE BLD-MCNC: 361 MG/DL (ref 70–99)
HCT VFR BLD CALC: 39 % (ref 41–53)
HEMOGLOBIN: 12.9 G/DL (ref 13.5–17.5)
IMMATURE GRANULOCYTES: ABNORMAL %
LYMPHOCYTES # BLD: 14 % (ref 24–44)
MCH RBC QN AUTO: 28.1 PG (ref 26–34)
MCHC RBC AUTO-ENTMCNC: 33 G/DL (ref 31–37)
MCV RBC AUTO: 85.2 FL (ref 80–100)
MONOCYTES # BLD: 7 % (ref 1–7)
NRBC AUTOMATED: ABNORMAL PER 100 WBC
PDW BLD-RTO: 15.4 % (ref 11.5–14.9)
PLATELET # BLD: 210 K/UL (ref 150–450)
PLATELET ESTIMATE: ABNORMAL
PMV BLD AUTO: 9.4 FL (ref 6–12)
POTASSIUM SERPL-SCNC: 5 MMOL/L (ref 3.7–5.3)
RBC # BLD: 4.59 M/UL (ref 4.5–5.9)
RBC # BLD: ABNORMAL 10*6/UL
SEG NEUTROPHILS: 76 % (ref 36–66)
SEGMENTED NEUTROPHILS ABSOLUTE COUNT: 7.8 K/UL (ref 1.3–9.1)
SODIUM BLD-SCNC: 132 MMOL/L (ref 135–144)
TSH SERPL DL<=0.05 MIU/L-ACNC: 1.97 MIU/L (ref 0.3–5)
VITAMIN B-12: 379 PG/ML (ref 232–1245)
WBC # BLD: 10.3 K/UL (ref 3.5–11)
WBC # BLD: ABNORMAL 10*3/UL

## 2019-02-22 PROCEDURE — 82306 VITAMIN D 25 HYDROXY: CPT

## 2019-02-22 PROCEDURE — 11042 DBRDMT SUBQ TIS 1ST 20SQCM/<: CPT | Performed by: PODIATRIST

## 2019-02-22 PROCEDURE — G8926 SPIRO NO PERF OR DOC: HCPCS | Performed by: FAMILY MEDICINE

## 2019-02-22 PROCEDURE — 87176 TISSUE HOMOGENIZATION CULTR: CPT

## 2019-02-22 PROCEDURE — 3023F SPIROM DOC REV: CPT | Performed by: FAMILY MEDICINE

## 2019-02-22 PROCEDURE — 1036F TOBACCO NON-USER: CPT | Performed by: FAMILY MEDICINE

## 2019-02-22 PROCEDURE — 99203 OFFICE O/P NEW LOW 30 MIN: CPT | Performed by: PODIATRIST

## 2019-02-22 PROCEDURE — 11042 DBRDMT SUBQ TIS 1ST 20SQCM/<: CPT

## 2019-02-22 PROCEDURE — 85025 COMPLETE CBC W/AUTO DIFF WBC: CPT

## 2019-02-22 PROCEDURE — 2022F DILAT RTA XM EVC RTNOPTHY: CPT | Performed by: FAMILY MEDICINE

## 2019-02-22 PROCEDURE — 87075 CULTR BACTERIA EXCEPT BLOOD: CPT

## 2019-02-22 PROCEDURE — 80048 BASIC METABOLIC PNL TOTAL CA: CPT

## 2019-02-22 PROCEDURE — 36415 COLL VENOUS BLD VENIPUNCTURE: CPT

## 2019-02-22 PROCEDURE — 1111F DSCHRG MED/CURRENT MED MERGE: CPT | Performed by: FAMILY MEDICINE

## 2019-02-22 PROCEDURE — 87070 CULTURE OTHR SPECIMN AEROBIC: CPT

## 2019-02-22 PROCEDURE — 3017F COLORECTAL CA SCREEN DOC REV: CPT | Performed by: FAMILY MEDICINE

## 2019-02-22 PROCEDURE — G8598 ASA/ANTIPLAT THER USED: HCPCS | Performed by: FAMILY MEDICINE

## 2019-02-22 PROCEDURE — 99213 OFFICE O/P EST LOW 20 MIN: CPT

## 2019-02-22 PROCEDURE — 82607 VITAMIN B-12: CPT

## 2019-02-22 PROCEDURE — 84443 ASSAY THYROID STIM HORMONE: CPT

## 2019-02-22 PROCEDURE — 3046F HEMOGLOBIN A1C LEVEL >9.0%: CPT | Performed by: FAMILY MEDICINE

## 2019-02-22 PROCEDURE — G8427 DOCREV CUR MEDS BY ELIG CLIN: HCPCS | Performed by: FAMILY MEDICINE

## 2019-02-22 PROCEDURE — G8417 CALC BMI ABV UP PARAM F/U: HCPCS | Performed by: FAMILY MEDICINE

## 2019-02-22 PROCEDURE — 99215 OFFICE O/P EST HI 40 MIN: CPT | Performed by: FAMILY MEDICINE

## 2019-02-22 PROCEDURE — G8482 FLU IMMUNIZE ORDER/ADMIN: HCPCS | Performed by: FAMILY MEDICINE

## 2019-02-22 PROCEDURE — 87205 SMEAR GRAM STAIN: CPT

## 2019-02-22 PROCEDURE — 86403 PARTICLE AGGLUT ANTBDY SCRN: CPT

## 2019-02-22 PROCEDURE — 82746 ASSAY OF FOLIC ACID SERUM: CPT

## 2019-02-22 PROCEDURE — G0444 DEPRESSION SCREEN ANNUAL: HCPCS | Performed by: FAMILY MEDICINE

## 2019-02-22 RX ORDER — LIDOCAINE HYDROCHLORIDE 40 MG/ML
SOLUTION TOPICAL ONCE
Status: DISCONTINUED | OUTPATIENT
Start: 2019-02-22 | End: 2019-02-23 | Stop reason: HOSPADM

## 2019-02-22 RX ORDER — AMOXICILLIN AND CLAVULANATE POTASSIUM 875; 125 MG/1; MG/1
1 TABLET, FILM COATED ORAL EVERY 12 HOURS
Qty: 20 TABLET | Refills: 0 | Status: SHIPPED | OUTPATIENT
Start: 2019-02-22 | End: 2019-03-04

## 2019-02-22 RX ORDER — DOXYCYCLINE HYCLATE 100 MG
100 TABLET ORAL 2 TIMES DAILY
Qty: 20 TABLET | Refills: 0 | Status: SHIPPED | OUTPATIENT
Start: 2019-02-22 | End: 2019-03-04

## 2019-02-22 ASSESSMENT — ANXIETY QUESTIONNAIRES
3. WORRYING TOO MUCH ABOUT DIFFERENT THINGS: 2-OVER HALF THE DAYS
7. FEELING AFRAID AS IF SOMETHING AWFUL MIGHT HAPPEN: 0-NOT AT ALL SURE
2. NOT BEING ABLE TO STOP OR CONTROL WORRYING: 1-SEVERAL DAYS
GAD7 TOTAL SCORE: 11
5. BEING SO RESTLESS THAT IT IS HARD TO SIT STILL: 2-OVER HALF THE DAYS
1. FEELING NERVOUS, ANXIOUS, OR ON EDGE: 2-OVER HALF THE DAYS
4. TROUBLE RELAXING: 2-OVER HALF THE DAYS
6. BECOMING EASILY ANNOYED OR IRRITABLE: 2-OVER HALF THE DAYS

## 2019-02-22 ASSESSMENT — PATIENT HEALTH QUESTIONNAIRE - PHQ9
10. IF YOU CHECKED OFF ANY PROBLEMS, HOW DIFFICULT HAVE THESE PROBLEMS MADE IT FOR YOU TO DO YOUR WORK, TAKE CARE OF THINGS AT HOME, OR GET ALONG WITH OTHER PEOPLE: 1
3. TROUBLE FALLING OR STAYING ASLEEP: 0
SUM OF ALL RESPONSES TO PHQ QUESTIONS 1-9: 11
5. POOR APPETITE OR OVEREATING: 2
2. FEELING DOWN, DEPRESSED OR HOPELESS: 2
6. FEELING BAD ABOUT YOURSELF - OR THAT YOU ARE A FAILURE OR HAVE LET YOURSELF OR YOUR FAMILY DOWN: 3
SUM OF ALL RESPONSES TO PHQ QUESTIONS 1-9: 11
8. MOVING OR SPEAKING SO SLOWLY THAT OTHER PEOPLE COULD HAVE NOTICED. OR THE OPPOSITE, BEING SO FIGETY OR RESTLESS THAT YOU HAVE BEEN MOVING AROUND A LOT MORE THAN USUAL: 0
7. TROUBLE CONCENTRATING ON THINGS, SUCH AS READING THE NEWSPAPER OR WATCHING TELEVISION: 2
1. LITTLE INTEREST OR PLEASURE IN DOING THINGS: 0
9. THOUGHTS THAT YOU WOULD BE BETTER OFF DEAD, OR OF HURTING YOURSELF: 0
4. FEELING TIRED OR HAVING LITTLE ENERGY: 2
SUM OF ALL RESPONSES TO PHQ9 QUESTIONS 1 & 2: 2

## 2019-02-22 ASSESSMENT — ENCOUNTER SYMPTOMS
APNEA: 1
VOMITING: 0
NAUSEA: 0
CHEST TIGHTNESS: 0
WHEEZING: 0
ABDOMINAL DISTENTION: 0
SHORTNESS OF BREATH: 1
ABDOMINAL PAIN: 1
DIARRHEA: 0
COUGH: 1
CONSTIPATION: 0
BACK PAIN: 1

## 2019-02-23 LAB — VITAMIN D 25-HYDROXY: 41.2 NG/ML (ref 30–100)

## 2019-02-25 DIAGNOSIS — E55.9 VITAMIN D DEFICIENCY: ICD-10-CM

## 2019-02-25 DIAGNOSIS — K59.01 SLOW TRANSIT CONSTIPATION: ICD-10-CM

## 2019-02-25 RX ORDER — DOCUSATE SODIUM 100 MG/1
100 CAPSULE, LIQUID FILLED ORAL 2 TIMES DAILY
Qty: 180 CAPSULE | Refills: 3 | Status: SHIPPED | OUTPATIENT
Start: 2019-02-25 | End: 2020-01-14 | Stop reason: SDUPTHER

## 2019-02-26 ENCOUNTER — HOSPITAL ENCOUNTER (OUTPATIENT)
Dept: WOUND CARE | Age: 54
Discharge: HOME | End: 2019-02-26
Payer: COMMERCIAL

## 2019-02-26 VITALS
DIASTOLIC BLOOD PRESSURE: 53 MMHG | HEART RATE: 107 BPM | RESPIRATION RATE: 20 BRPM | WEIGHT: 315 LBS | TEMPERATURE: 98.3 F | HEIGHT: 72 IN | BODY MASS INDEX: 42.66 KG/M2 | SYSTOLIC BLOOD PRESSURE: 109 MMHG

## 2019-02-26 DIAGNOSIS — E11.42 TYPE 2 DIABETES MELLITUS WITH DIABETIC POLYNEUROPATHY, WITH LONG-TERM CURRENT USE OF INSULIN (HCC): ICD-10-CM

## 2019-02-26 DIAGNOSIS — L97.922 CHRONIC ULCER OF LEFT LEG, WITH FAT LAYER EXPOSED (HCC): ICD-10-CM

## 2019-02-26 DIAGNOSIS — I87.2 VENOUS INSUFFICIENCY OF LEFT LOWER EXTREMITY: Primary | ICD-10-CM

## 2019-02-26 DIAGNOSIS — I87.2 STASIS DERMATITIS OF BOTH LEGS: ICD-10-CM

## 2019-02-26 DIAGNOSIS — Z79.4 TYPE 2 DIABETES MELLITUS WITH DIABETIC POLYNEUROPATHY, WITH LONG-TERM CURRENT USE OF INSULIN (HCC): ICD-10-CM

## 2019-02-26 PROCEDURE — 29580 STRAPPING UNNA BOOT: CPT

## 2019-02-26 PROCEDURE — 6370000000 HC RX 637 (ALT 250 FOR IP): Performed by: PODIATRIST

## 2019-02-26 PROCEDURE — 99213 OFFICE O/P EST LOW 20 MIN: CPT | Performed by: PODIATRIST

## 2019-02-26 RX ORDER — LIDOCAINE HYDROCHLORIDE 40 MG/ML
SOLUTION TOPICAL ONCE
Status: COMPLETED | OUTPATIENT
Start: 2019-02-26 | End: 2019-02-26

## 2019-02-26 RX ADMIN — LIDOCAINE HYDROCHLORIDE 5 ML: 40 SOLUTION TOPICAL at 08:37

## 2019-02-26 ASSESSMENT — PAIN DESCRIPTION - DESCRIPTORS: DESCRIPTORS: THROBBING

## 2019-02-26 ASSESSMENT — PAIN - FUNCTIONAL ASSESSMENT: PAIN_FUNCTIONAL_ASSESSMENT: ACTIVITIES ARE NOT PREVENTED

## 2019-02-26 ASSESSMENT — PAIN SCALES - GENERAL: PAINLEVEL_OUTOF10: 2

## 2019-02-26 ASSESSMENT — PAIN DESCRIPTION - PROGRESSION: CLINICAL_PROGRESSION: GRADUALLY IMPROVING

## 2019-02-26 ASSESSMENT — PAIN DESCRIPTION - ORIENTATION: ORIENTATION: LEFT

## 2019-02-26 ASSESSMENT — PAIN DESCRIPTION - LOCATION: LOCATION: LEG

## 2019-02-26 ASSESSMENT — PAIN DESCRIPTION - ONSET: ONSET: ON-GOING

## 2019-02-26 ASSESSMENT — PAIN DESCRIPTION - PAIN TYPE: TYPE: CHRONIC PAIN

## 2019-02-26 ASSESSMENT — PAIN DESCRIPTION - FREQUENCY: FREQUENCY: INTERMITTENT

## 2019-02-27 ENCOUNTER — HOSPITAL ENCOUNTER (OUTPATIENT)
Dept: OTHER | Age: 54
Discharge: HOME | End: 2019-02-27
Payer: COMMERCIAL

## 2019-02-27 ENCOUNTER — PATIENT MESSAGE (OUTPATIENT)
Dept: FAMILY MEDICINE CLINIC | Age: 54
End: 2019-02-27

## 2019-02-27 VITALS
HEART RATE: 95 BPM | DIASTOLIC BLOOD PRESSURE: 60 MMHG | SYSTOLIC BLOOD PRESSURE: 106 MMHG | WEIGHT: 315 LBS | BODY MASS INDEX: 53.99 KG/M2 | RESPIRATION RATE: 20 BRPM | OXYGEN SATURATION: 97 %

## 2019-02-27 DIAGNOSIS — N18.30 CKD (CHRONIC KIDNEY DISEASE) STAGE 3, GFR 30-59 ML/MIN (HCC): ICD-10-CM

## 2019-02-27 DIAGNOSIS — I50.32 CHRONIC DIASTOLIC CONGESTIVE HEART FAILURE (HCC): Primary | ICD-10-CM

## 2019-02-27 DIAGNOSIS — J20.9 ACUTE BRONCHITIS DUE TO INFECTION: Primary | ICD-10-CM

## 2019-02-27 DIAGNOSIS — E87.1 HYPONATREMIA: ICD-10-CM

## 2019-02-27 LAB
ANION GAP SERPL CALCULATED.3IONS-SCNC: 14 MMOL/L (ref 9–17)
BUN BLDV-MCNC: 46 MG/DL (ref 6–20)
CHLORIDE BLD-SCNC: 92 MMOL/L (ref 98–107)
CO2: 24 MMOL/L (ref 20–31)
CREAT SERPL-MCNC: 1.74 MG/DL (ref 0.7–1.2)
CULTURE: ABNORMAL
DIRECT EXAM: ABNORMAL
DIRECT EXAM: ABNORMAL
GFR AFRICAN AMERICAN: 50 ML/MIN
GFR NON-AFRICAN AMERICAN: 41 ML/MIN
GFR SERPL CREATININE-BSD FRML MDRD: ABNORMAL ML/MIN/{1.73_M2}
GFR SERPL CREATININE-BSD FRML MDRD: ABNORMAL ML/MIN/{1.73_M2}
Lab: ABNORMAL
POTASSIUM SERPL-SCNC: 4.9 MMOL/L (ref 3.7–5.3)
SODIUM BLD-SCNC: 130 MMOL/L (ref 135–144)
SPECIMEN DESCRIPTION: ABNORMAL

## 2019-02-27 PROCEDURE — 80051 ELECTROLYTE PANEL: CPT

## 2019-02-27 PROCEDURE — 84520 ASSAY OF UREA NITROGEN: CPT

## 2019-02-27 PROCEDURE — 82565 ASSAY OF CREATININE: CPT

## 2019-02-27 PROCEDURE — 36415 COLL VENOUS BLD VENIPUNCTURE: CPT

## 2019-02-27 PROCEDURE — 99211 OFF/OP EST MAY X REQ PHY/QHP: CPT

## 2019-02-27 RX ORDER — BENZONATATE 100 MG/1
100 CAPSULE ORAL 3 TIMES DAILY PRN
Qty: 21 CAPSULE | Refills: 0 | Status: SHIPPED | OUTPATIENT
Start: 2019-02-27 | End: 2019-03-06

## 2019-02-27 RX ORDER — OSELTAMIVIR PHOSPHATE 75 MG/1
75 CAPSULE ORAL 2 TIMES DAILY
Qty: 10 CAPSULE | Refills: 0 | Status: SHIPPED | OUTPATIENT
Start: 2019-02-27 | End: 2019-03-04

## 2019-03-01 ENCOUNTER — TELEPHONE (OUTPATIENT)
Dept: FAMILY MEDICINE CLINIC | Age: 54
End: 2019-03-01

## 2019-03-05 ENCOUNTER — HOSPITAL ENCOUNTER (OUTPATIENT)
Dept: WOUND CARE | Age: 54
Discharge: HOME | End: 2019-03-05
Payer: COMMERCIAL

## 2019-03-05 ENCOUNTER — PATIENT MESSAGE (OUTPATIENT)
Dept: FAMILY MEDICINE CLINIC | Age: 54
End: 2019-03-05

## 2019-03-05 DIAGNOSIS — G47.01 INSOMNIA DUE TO MEDICAL CONDITION: ICD-10-CM

## 2019-03-05 DIAGNOSIS — M51.36 DDD (DEGENERATIVE DISC DISEASE), LUMBAR: ICD-10-CM

## 2019-03-05 DIAGNOSIS — M96.1 POSTLAMINECTOMY SYNDROME OF LUMBAR REGION: ICD-10-CM

## 2019-03-05 DIAGNOSIS — E11.42 TYPE 2 DIABETES MELLITUS WITH DIABETIC POLYNEUROPATHY, WITH LONG-TERM CURRENT USE OF INSULIN (HCC): Primary | ICD-10-CM

## 2019-03-05 DIAGNOSIS — Z79.4 TYPE 2 DIABETES MELLITUS WITH DIABETIC POLYNEUROPATHY, WITH LONG-TERM CURRENT USE OF INSULIN (HCC): Primary | ICD-10-CM

## 2019-03-05 DIAGNOSIS — M54.42 CHRONIC MIDLINE LOW BACK PAIN WITH LEFT-SIDED SCIATICA: ICD-10-CM

## 2019-03-05 DIAGNOSIS — G89.29 CHRONIC MIDLINE LOW BACK PAIN WITH LEFT-SIDED SCIATICA: ICD-10-CM

## 2019-03-05 DIAGNOSIS — I50.32 CHRONIC DIASTOLIC CONGESTIVE HEART FAILURE (HCC): ICD-10-CM

## 2019-03-05 DIAGNOSIS — I87.2 VENOUS INSUFFICIENCY OF LEFT LOWER EXTREMITY: ICD-10-CM

## 2019-03-05 DIAGNOSIS — N18.30 CKD (CHRONIC KIDNEY DISEASE) STAGE 3, GFR 30-59 ML/MIN (HCC): ICD-10-CM

## 2019-03-05 DIAGNOSIS — L97.922 CHRONIC ULCER OF LEFT LEG, WITH FAT LAYER EXPOSED (HCC): ICD-10-CM

## 2019-03-05 DIAGNOSIS — M48.061 SPINAL STENOSIS OF LUMBAR REGION WITHOUT NEUROGENIC CLAUDICATION: ICD-10-CM

## 2019-03-05 PROCEDURE — 99213 OFFICE O/P EST LOW 20 MIN: CPT | Performed by: PODIATRIST

## 2019-03-05 PROCEDURE — 99212 OFFICE O/P EST SF 10 MIN: CPT

## 2019-03-05 PROCEDURE — 6370000000 HC RX 637 (ALT 250 FOR IP): Performed by: PODIATRIST

## 2019-03-05 RX ORDER — LIDOCAINE HYDROCHLORIDE 40 MG/ML
SOLUTION TOPICAL ONCE
Status: COMPLETED | OUTPATIENT
Start: 2019-03-05 | End: 2019-03-05

## 2019-03-05 RX ORDER — OXYCODONE HYDROCHLORIDE 10 MG/1
10 TABLET ORAL EVERY 8 HOURS PRN
Qty: 90 TABLET | Refills: 0 | Status: SHIPPED | OUTPATIENT
Start: 2019-03-05 | End: 2019-04-05 | Stop reason: SDUPTHER

## 2019-03-05 RX ADMIN — LIDOCAINE HYDROCHLORIDE: 40 SOLUTION TOPICAL at 08:42

## 2019-03-15 ENCOUNTER — OFFICE VISIT (OUTPATIENT)
Dept: FAMILY MEDICINE CLINIC | Age: 54
End: 2019-03-15
Payer: COMMERCIAL

## 2019-03-15 VITALS
TEMPERATURE: 97.2 F | HEIGHT: 72 IN | HEART RATE: 82 BPM | DIASTOLIC BLOOD PRESSURE: 66 MMHG | WEIGHT: 315 LBS | BODY MASS INDEX: 42.66 KG/M2 | SYSTOLIC BLOOD PRESSURE: 127 MMHG | OXYGEN SATURATION: 98 %

## 2019-03-15 DIAGNOSIS — N18.30 CKD (CHRONIC KIDNEY DISEASE) STAGE 3, GFR 30-59 ML/MIN (HCC): ICD-10-CM

## 2019-03-15 DIAGNOSIS — M54.42 CHRONIC MIDLINE LOW BACK PAIN WITH LEFT-SIDED SCIATICA: ICD-10-CM

## 2019-03-15 DIAGNOSIS — E11.42 TYPE 2 DIABETES MELLITUS WITH DIABETIC POLYNEUROPATHY, WITH LONG-TERM CURRENT USE OF INSULIN (HCC): ICD-10-CM

## 2019-03-15 DIAGNOSIS — G89.29 CHRONIC MIDLINE LOW BACK PAIN WITH LEFT-SIDED SCIATICA: ICD-10-CM

## 2019-03-15 DIAGNOSIS — J44.9 MIXED TYPE COPD (CHRONIC OBSTRUCTIVE PULMONARY DISEASE) (HCC): ICD-10-CM

## 2019-03-15 DIAGNOSIS — Z79.4 TYPE 2 DIABETES MELLITUS WITH DIABETIC POLYNEUROPATHY, WITH LONG-TERM CURRENT USE OF INSULIN (HCC): ICD-10-CM

## 2019-03-15 DIAGNOSIS — M96.1 POSTLAMINECTOMY SYNDROME OF LUMBAR REGION: ICD-10-CM

## 2019-03-15 DIAGNOSIS — I50.32 CHRONIC DIASTOLIC CONGESTIVE HEART FAILURE (HCC): Primary | ICD-10-CM

## 2019-03-15 PROCEDURE — G8417 CALC BMI ABV UP PARAM F/U: HCPCS | Performed by: FAMILY MEDICINE

## 2019-03-15 PROCEDURE — 3046F HEMOGLOBIN A1C LEVEL >9.0%: CPT | Performed by: FAMILY MEDICINE

## 2019-03-15 PROCEDURE — 3023F SPIROM DOC REV: CPT | Performed by: FAMILY MEDICINE

## 2019-03-15 PROCEDURE — G0444 DEPRESSION SCREEN ANNUAL: HCPCS | Performed by: FAMILY MEDICINE

## 2019-03-15 PROCEDURE — 2022F DILAT RTA XM EVC RTNOPTHY: CPT | Performed by: FAMILY MEDICINE

## 2019-03-15 PROCEDURE — 3017F COLORECTAL CA SCREEN DOC REV: CPT | Performed by: FAMILY MEDICINE

## 2019-03-15 PROCEDURE — 99214 OFFICE O/P EST MOD 30 MIN: CPT | Performed by: FAMILY MEDICINE

## 2019-03-15 PROCEDURE — G8427 DOCREV CUR MEDS BY ELIG CLIN: HCPCS | Performed by: FAMILY MEDICINE

## 2019-03-15 PROCEDURE — G8482 FLU IMMUNIZE ORDER/ADMIN: HCPCS | Performed by: FAMILY MEDICINE

## 2019-03-15 PROCEDURE — G8926 SPIRO NO PERF OR DOC: HCPCS | Performed by: FAMILY MEDICINE

## 2019-03-15 PROCEDURE — G8598 ASA/ANTIPLAT THER USED: HCPCS | Performed by: FAMILY MEDICINE

## 2019-03-15 PROCEDURE — 1036F TOBACCO NON-USER: CPT | Performed by: FAMILY MEDICINE

## 2019-03-15 ASSESSMENT — ENCOUNTER SYMPTOMS
ABDOMINAL PAIN: 1
NAUSEA: 0
CONSTIPATION: 0
WHEEZING: 0
DIARRHEA: 0
ABDOMINAL DISTENTION: 0
BACK PAIN: 1
VOMITING: 0
CHEST TIGHTNESS: 0
APNEA: 1

## 2019-03-15 ASSESSMENT — PATIENT HEALTH QUESTIONNAIRE - PHQ9
SUM OF ALL RESPONSES TO PHQ QUESTIONS 1-9: 17
8. MOVING OR SPEAKING SO SLOWLY THAT OTHER PEOPLE COULD HAVE NOTICED. OR THE OPPOSITE, BEING SO FIGETY OR RESTLESS THAT YOU HAVE BEEN MOVING AROUND A LOT MORE THAN USUAL: 0
6. FEELING BAD ABOUT YOURSELF - OR THAT YOU ARE A FAILURE OR HAVE LET YOURSELF OR YOUR FAMILY DOWN: 3
7. TROUBLE CONCENTRATING ON THINGS, SUCH AS READING THE NEWSPAPER OR WATCHING TELEVISION: 0
5. POOR APPETITE OR OVEREATING: 2
3. TROUBLE FALLING OR STAYING ASLEEP: 3
1. LITTLE INTEREST OR PLEASURE IN DOING THINGS: 3
SUM OF ALL RESPONSES TO PHQ9 QUESTIONS 1 & 2: 6
2. FEELING DOWN, DEPRESSED OR HOPELESS: 3
10. IF YOU CHECKED OFF ANY PROBLEMS, HOW DIFFICULT HAVE THESE PROBLEMS MADE IT FOR YOU TO DO YOUR WORK, TAKE CARE OF THINGS AT HOME, OR GET ALONG WITH OTHER PEOPLE: 2
9. THOUGHTS THAT YOU WOULD BE BETTER OFF DEAD, OR OF HURTING YOURSELF: 0
4. FEELING TIRED OR HAVING LITTLE ENERGY: 3
SUM OF ALL RESPONSES TO PHQ QUESTIONS 1-9: 17

## 2019-03-17 DIAGNOSIS — I50.32 CHRONIC DIASTOLIC CONGESTIVE HEART FAILURE (HCC): ICD-10-CM

## 2019-03-18 RX ORDER — SPIRONOLACTONE 50 MG/1
50 TABLET, FILM COATED ORAL DAILY
Qty: 90 TABLET | Refills: 3 | Status: SHIPPED | OUTPATIENT
Start: 2019-03-18 | End: 2020-02-13 | Stop reason: SDUPTHER

## 2019-03-20 ENCOUNTER — HOSPITAL ENCOUNTER (OUTPATIENT)
Dept: PHARMACY | Age: 54
Setting detail: THERAPIES SERIES
Discharge: HOME | End: 2019-03-20
Payer: COMMERCIAL

## 2019-03-20 ENCOUNTER — HOSPITAL ENCOUNTER (OUTPATIENT)
Dept: OTHER | Age: 54
Discharge: HOME | End: 2019-03-20
Payer: COMMERCIAL

## 2019-03-20 VITALS
DIASTOLIC BLOOD PRESSURE: 60 MMHG | WEIGHT: 315 LBS | SYSTOLIC BLOOD PRESSURE: 110 MMHG | HEART RATE: 86 BPM | OXYGEN SATURATION: 97 % | RESPIRATION RATE: 20 BRPM | BODY MASS INDEX: 54.71 KG/M2

## 2019-03-20 PROCEDURE — 99211 OFF/OP EST MAY X REQ PHY/QHP: CPT

## 2019-03-20 PROCEDURE — 99212 OFFICE O/P EST SF 10 MIN: CPT

## 2019-03-24 ENCOUNTER — TELEPHONE (OUTPATIENT)
Dept: FAMILY MEDICINE CLINIC | Age: 54
End: 2019-03-24

## 2019-03-24 PROBLEM — L03.116 CELLULITIS OF LEG, LEFT: Status: RESOLVED | Noted: 2017-07-20 | Resolved: 2019-03-24

## 2019-03-24 PROBLEM — S81.802A WOUND OF LEFT LEG: Status: RESOLVED | Noted: 2019-02-22 | Resolved: 2019-03-24

## 2019-03-24 PROBLEM — L85.3 DRY SKIN DERMATITIS: Status: RESOLVED | Noted: 2017-09-08 | Resolved: 2019-03-24

## 2019-03-24 PROBLEM — L97.922 CHRONIC ULCER OF LEFT LEG, WITH FAT LAYER EXPOSED (HCC): Status: RESOLVED | Noted: 2019-02-22 | Resolved: 2019-03-24

## 2019-03-24 ASSESSMENT — ENCOUNTER SYMPTOMS
SHORTNESS OF BREATH: 1
COUGH: 1

## 2019-03-26 DIAGNOSIS — J44.9 CHRONIC OBSTRUCTIVE PULMONARY DISEASE, UNSPECIFIED COPD TYPE (HCC): ICD-10-CM

## 2019-03-27 RX ORDER — TIOTROPIUM BROMIDE 18 UG/1
CAPSULE ORAL; RESPIRATORY (INHALATION)
Qty: 60 CAPSULE | Refills: 10 | Status: SHIPPED | OUTPATIENT
Start: 2019-03-27 | End: 2019-06-04 | Stop reason: ALTCHOICE

## 2019-03-28 ENCOUNTER — HOSPITAL ENCOUNTER (OUTPATIENT)
Age: 54
Setting detail: OBSERVATION
Discharge: HOME | End: 2019-03-28
Attending: EMERGENCY MEDICINE
Payer: COMMERCIAL

## 2019-03-28 ENCOUNTER — HOSPITAL ENCOUNTER (OUTPATIENT)
Dept: OTHER | Age: 54
Discharge: HOME | End: 2019-03-28
Payer: COMMERCIAL

## 2019-03-28 ENCOUNTER — APPOINTMENT (OUTPATIENT)
Dept: GENERAL RADIOLOGY | Age: 54
End: 2019-03-28
Payer: COMMERCIAL

## 2019-03-28 VITALS
TEMPERATURE: 98.6 F | HEART RATE: 101 BPM | BODY MASS INDEX: 42.66 KG/M2 | DIASTOLIC BLOOD PRESSURE: 64 MMHG | WEIGHT: 315 LBS | OXYGEN SATURATION: 95 % | HEIGHT: 72 IN | SYSTOLIC BLOOD PRESSURE: 156 MMHG | RESPIRATION RATE: 16 BRPM

## 2019-03-28 VITALS
DIASTOLIC BLOOD PRESSURE: 60 MMHG | BODY MASS INDEX: 54.79 KG/M2 | SYSTOLIC BLOOD PRESSURE: 118 MMHG | HEART RATE: 88 BPM | RESPIRATION RATE: 24 BRPM | WEIGHT: 315 LBS | OXYGEN SATURATION: 96 %

## 2019-03-28 DIAGNOSIS — J44.1 COPD EXACERBATION (HCC): Primary | ICD-10-CM

## 2019-03-28 DIAGNOSIS — R06.02 SHORTNESS OF BREATH: ICD-10-CM

## 2019-03-28 LAB
ABSOLUTE EOS #: 0.3 K/UL (ref 0–0.4)
ABSOLUTE IMMATURE GRANULOCYTE: ABNORMAL K/UL (ref 0–0.3)
ABSOLUTE LYMPH #: 1.1 K/UL (ref 1–4.8)
ABSOLUTE MONO #: 0.6 K/UL (ref 0.1–1.3)
ANION GAP SERPL CALCULATED.3IONS-SCNC: 15 MMOL/L (ref 9–17)
ANION GAP SERPL CALCULATED.3IONS-SCNC: 17 MMOL/L (ref 9–17)
BASOPHILS # BLD: 1 % (ref 0–2)
BASOPHILS ABSOLUTE: 0.1 K/UL (ref 0–0.2)
BNP INTERPRETATION: NORMAL
BNP INTERPRETATION: NORMAL
BUN BLDV-MCNC: 41 MG/DL (ref 6–20)
BUN BLDV-MCNC: 41 MG/DL (ref 6–20)
BUN/CREAT BLD: ABNORMAL (ref 9–20)
CALCIUM SERPL-MCNC: 9.2 MG/DL (ref 8.6–10.4)
CHLORIDE BLD-SCNC: 91 MMOL/L (ref 98–107)
CHLORIDE BLD-SCNC: 95 MMOL/L (ref 98–107)
CO2: 26 MMOL/L (ref 20–31)
CO2: 29 MMOL/L (ref 20–31)
CREAT SERPL-MCNC: 1.67 MG/DL (ref 0.7–1.2)
CREAT SERPL-MCNC: 1.79 MG/DL (ref 0.7–1.2)
DIFFERENTIAL TYPE: ABNORMAL
EOSINOPHILS RELATIVE PERCENT: 4 % (ref 0–4)
GFR AFRICAN AMERICAN: 48 ML/MIN
GFR AFRICAN AMERICAN: 52 ML/MIN
GFR NON-AFRICAN AMERICAN: 40 ML/MIN
GFR NON-AFRICAN AMERICAN: 43 ML/MIN
GFR SERPL CREATININE-BSD FRML MDRD: ABNORMAL ML/MIN/{1.73_M2}
GLUCOSE BLD-MCNC: 96 MG/DL (ref 70–99)
HCT VFR BLD CALC: 37.2 % (ref 41–53)
HEMOGLOBIN: 12.5 G/DL (ref 13.5–17.5)
IMMATURE GRANULOCYTES: ABNORMAL %
LYMPHOCYTES # BLD: 14 % (ref 24–44)
MCH RBC QN AUTO: 28.7 PG (ref 26–34)
MCHC RBC AUTO-ENTMCNC: 33.6 G/DL (ref 31–37)
MCV RBC AUTO: 85.6 FL (ref 80–100)
MONOCYTES # BLD: 8 % (ref 1–7)
NRBC AUTOMATED: ABNORMAL PER 100 WBC
PDW BLD-RTO: 15.4 % (ref 11.5–14.9)
PLATELET # BLD: 214 K/UL (ref 150–450)
PLATELET ESTIMATE: ABNORMAL
PMV BLD AUTO: 9 FL (ref 6–12)
POTASSIUM SERPL-SCNC: 4.1 MMOL/L (ref 3.7–5.3)
POTASSIUM SERPL-SCNC: 4.2 MMOL/L (ref 3.7–5.3)
PRO-BNP: 64 PG/ML
PRO-BNP: 64 PG/ML
RBC # BLD: 4.35 M/UL (ref 4.5–5.9)
RBC # BLD: ABNORMAL 10*6/UL
SEG NEUTROPHILS: 73 % (ref 36–66)
SEGMENTED NEUTROPHILS ABSOLUTE COUNT: 5.5 K/UL (ref 1.3–9.1)
SODIUM BLD-SCNC: 135 MMOL/L (ref 135–144)
SODIUM BLD-SCNC: 138 MMOL/L (ref 135–144)
TROPONIN INTERP: ABNORMAL
TROPONIN INTERP: ABNORMAL
TROPONIN T: ABNORMAL NG/ML
TROPONIN T: ABNORMAL NG/ML
TROPONIN, HIGH SENSITIVITY: 23 NG/L (ref 0–22)
TROPONIN, HIGH SENSITIVITY: 29 NG/L (ref 0–22)
WBC # BLD: 7.6 K/UL (ref 3.5–11)
WBC # BLD: ABNORMAL 10*3/UL

## 2019-03-28 PROCEDURE — 96374 THER/PROPH/DIAG INJ IV PUSH: CPT

## 2019-03-28 PROCEDURE — 85025 COMPLETE CBC W/AUTO DIFF WBC: CPT

## 2019-03-28 PROCEDURE — 80048 BASIC METABOLIC PNL TOTAL CA: CPT

## 2019-03-28 PROCEDURE — 82565 ASSAY OF CREATININE: CPT

## 2019-03-28 PROCEDURE — 6360000002 HC RX W HCPCS: Performed by: EMERGENCY MEDICINE

## 2019-03-28 PROCEDURE — 6370000000 HC RX 637 (ALT 250 FOR IP): Performed by: EMERGENCY MEDICINE

## 2019-03-28 PROCEDURE — 84520 ASSAY OF UREA NITROGEN: CPT

## 2019-03-28 PROCEDURE — 94640 AIRWAY INHALATION TREATMENT: CPT

## 2019-03-28 PROCEDURE — 84484 ASSAY OF TROPONIN QUANT: CPT

## 2019-03-28 PROCEDURE — 99211 OFF/OP EST MAY X REQ PHY/QHP: CPT

## 2019-03-28 PROCEDURE — 36415 COLL VENOUS BLD VENIPUNCTURE: CPT

## 2019-03-28 PROCEDURE — 80051 ELECTROLYTE PANEL: CPT

## 2019-03-28 PROCEDURE — 99285 EMERGENCY DEPT VISIT HI MDM: CPT

## 2019-03-28 PROCEDURE — 83880 ASSAY OF NATRIURETIC PEPTIDE: CPT

## 2019-03-28 PROCEDURE — G0378 HOSPITAL OBSERVATION PER HR: HCPCS

## 2019-03-28 PROCEDURE — 71045 X-RAY EXAM CHEST 1 VIEW: CPT

## 2019-03-28 RX ORDER — ALBUTEROL SULFATE 90 UG/1
2 AEROSOL, METERED RESPIRATORY (INHALATION)
Status: DISCONTINUED | OUTPATIENT
Start: 2019-03-28 | End: 2019-03-28 | Stop reason: HOSPADM

## 2019-03-28 RX ORDER — ACETAMINOPHEN 325 MG/1
650 TABLET ORAL EVERY 4 HOURS PRN
Status: CANCELLED | OUTPATIENT
Start: 2019-03-28

## 2019-03-28 RX ORDER — ALBUTEROL SULFATE 2.5 MG/3ML
5 SOLUTION RESPIRATORY (INHALATION)
Status: DISCONTINUED | OUTPATIENT
Start: 2019-03-28 | End: 2019-03-28 | Stop reason: HOSPADM

## 2019-03-28 RX ORDER — SODIUM CHLORIDE 0.9 % (FLUSH) 0.9 %
10 SYRINGE (ML) INJECTION PRN
Status: CANCELLED | OUTPATIENT
Start: 2019-03-28

## 2019-03-28 RX ORDER — METHYLPREDNISOLONE SODIUM SUCCINATE 125 MG/2ML
125 INJECTION, POWDER, LYOPHILIZED, FOR SOLUTION INTRAMUSCULAR; INTRAVENOUS ONCE
Status: COMPLETED | OUTPATIENT
Start: 2019-03-28 | End: 2019-03-28

## 2019-03-28 RX ORDER — METOLAZONE 2.5 MG/1
2.5 TABLET ORAL
Status: ON HOLD | COMMUNITY
End: 2021-09-14 | Stop reason: HOSPADM

## 2019-03-28 RX ORDER — IPRATROPIUM BROMIDE AND ALBUTEROL SULFATE 2.5; .5 MG/3ML; MG/3ML
1 SOLUTION RESPIRATORY (INHALATION)
Status: DISCONTINUED | OUTPATIENT
Start: 2019-03-28 | End: 2019-03-28 | Stop reason: HOSPADM

## 2019-03-28 RX ORDER — SODIUM CHLORIDE 0.9 % (FLUSH) 0.9 %
10 SYRINGE (ML) INJECTION EVERY 12 HOURS SCHEDULED
Status: CANCELLED | OUTPATIENT
Start: 2019-03-28

## 2019-03-28 RX ADMIN — METHYLPREDNISOLONE SODIUM SUCCINATE 125 MG: 125 INJECTION, POWDER, FOR SOLUTION INTRAMUSCULAR; INTRAVENOUS at 12:27

## 2019-03-28 RX ADMIN — ALBUTEROL SULFATE 5 MG: 2.5 SOLUTION RESPIRATORY (INHALATION) at 12:43

## 2019-03-28 RX ADMIN — IPRATROPIUM BROMIDE AND ALBUTEROL SULFATE 1 AMPULE: .5; 3 SOLUTION RESPIRATORY (INHALATION) at 12:43

## 2019-03-28 NOTE — ED NOTES
Pt states that he does not want to be admitted observation. Pt only willing to stay for inpatient status which he does not meet. Pt claims last time he was observation they did not give him his medications correctly and his blood sugars and blood pressure were out of control. Pt states he wants to leave AMA. Johanna Alejandro looked over chart to confirm patient does not meet inpatient status.       Guilherme Crews, RN  03/28/19 0627

## 2019-03-28 NOTE — ED NOTES
Pt arrives to ED c/o SOB. Pt states he goes to cardiac rehab for the past 11 years. Pt states today at rehab, they wanted him to be evaluated d/t his increasing SOB. Pt states he does have COPD and states that for the past 2 weeks it has been getting worse. Pt states he has had chronic chest pain for weeks.  RT paged for an eval.      Charo Reaves RN  03/28/19 9730

## 2019-03-28 NOTE — ED PROVIDER NOTES
disease) ([address] COPD exacerbation ([address] Diabetic neuropathy ([address] Displacement of lumbar intervertebral disc without myelopathy, Ear infection, Facial cellulitis, Fall, GERD (gastroesophageal reflux disease), Head injury, Hearing loss in right ear, Hepatic steatosis, History of general anesthesia complication, History of rib fracture, Hyperlipidemia, Hypertension, Insomnia, Intolerance of continuous positive airway pressure (CPAP) ventilation, Mastoiditis of left side, Mixed conductive and sensorineural hearing loss of both ears, Mixed type COPD (chronic obstructive pulmonary disease) ([address] Moderate recurrent major depression ([address] Morbid obesity with BMI of 45.0-49.9, adult ([address] Neuropathy, Obesity, Open wound of groin, BARBY on CPAP, Osteoarthritis, Otitis externa of left ear, Pancreatitis chronic, Renal insufficiency, S/P cardiac cath, Severe depression ([address] Spinal stenosis of lumbar region without neurogenic claudication, Syncope, Tinnitus of both ears, Type 2 diabetes mellitus with stage 3 chronic kidney disease, with long-term current use of insulin ([facility]), Type II or unspecified type diabetes mellitus without mention of complication, not stated as uncontrolled, Wears glasses, and Wound of left leg. SURGICAL HISTORY      has a past surgical history that includes tympanomastoidectomy (Bilateral, 09/20/2012); Upper gastrointestinal endoscopy (4/13/2013); Coronary angioplasty with stent (March 2013); Hand tendon surgery (Left); Knee arthroscopy (Left); back surgery ( (x 4) 2000,.12/2011.2/2012); Nerve Block (07-31-15); Colonoscopy (11/3/2015); Cardiac catheterization (04/23/2018); and pr esophagogastroduodenoscopy transoral diagnostic (N/A, 7/18/2018). CURRENT MEDICATIONS       Previous Medications    ALBUTEROL (PROVENTIL) (2.5 MG/3ML) 0.083% NEBULIZER SOLUTION    Take 3 mLs by nebulization every 6 hours as needed for Wheezing or Shortness of Breath    ASPIRIN 81 MG TABLET    Take 81 mg by mouth daily. BLOOD GLUCOSE TEST STRIPS (ONE TOUCH ULTRA TEST) STRIP    USE ONE STRIP TO TEST 3 TIMES A DAY    BUMETANIDE (BUMEX) 1 MG TABLET    Take 3 tablets by mouth 2 times daily    CLOPIDOGREL (PLAVIX) 75 MG TABLET    Take 1 tablet by mouth daily    COMFORT ASSIST INSULIN SYRINGE 31G X 5/16\" 1 ML MISC        DOCUSATE SODIUM (STOOL SOFTENER) 100 MG CAPSULE    Take 1 capsule by mouth 2 times daily    FERROUS SULFATE (IRON) 325 (65 FE) MG TABS    Take 1 tablet by mouth daily    FLUTICASONE (CUTIVATE) 0.05 % CREAM    Apply topically 2 times daily     FLUTICASONE (FLONASE) 50 MCG/ACT NASAL SPRAY    2 sprays by Nasal route daily    FLUTICASONE-SALMETEROL (ADVAIR HFA) 230-21 MCG/ACT INHALER    Inhale 2 puffs into the lungs 2 times daily    GABAPENTIN (NEURONTIN) 300 MG CAPSULE    Take 1 capsule by mouth 3 times daily for 30 days. Chandan Fontanaach HANDICAP PLACARD MISC    by Does not apply route Can't walk greater than 200 feet. Expires in 5 years. INSULIN REGULAR HUMAN (HUMULIN R) 500 UNIT/ML CONCENTRATED INJECTION VIAL    Inject into the skin 3 times daily Inject 200 units with breakfast and lunch, and inject 150 units with dinner. LIDOCAINE (LMX) 4 % CREAM    Apply topically daily as needed. LISINOPRIL (PRINIVIL;ZESTRIL) 5 MG TABLET    Take 1 tablet by mouth daily . Discontinued Lisinopril  10 mg    MELATONIN 10 MG TABS    Take 10 mg by mouth nightly as needed (insomnia)    METOLAZONE (ZAROXOLYN) 2.5 MG TABLET    Take 1 tablet by mouth daily as needed (increased edema)    METOPROLOL TARTRATE (LOPRESSOR) 50 MG TABLET    Take 1 tablet by mouth 2 times daily    MICONAZOLE (MICOTIN) 2 % POWDER    Apply topically 2 times daily.     NICOTINE (NICODERM CQ) 21 MG/24HR    Place 1 patch onto the skin every 24 hours Step 1    NITROGLYCERIN (NITROSTAT) 0.4 MG SL TABLET    Place 1 tablet under the tongue every 5 minutes as needed for Chest pain    OXYCODONE HCL (OXY-IR) 10 MG IMMEDIATE RELEASE TABLET    Take 1 tablet by mouth every 8 hours as Constitutional: He is oriented to person, place, and time. He appears well-developed and well-nourished. No distress. HENT:   Head: Normocephalic and atraumatic. Right Ear: External ear normal.   Left Ear: External ear normal.   Eyes: Pupils are equal, round, and reactive to light. EOM are normal. Right eye exhibits no discharge. Left eye exhibits no discharge. No scleral icterus. Neck: No JVD present. No tracheal deviation present. Cardiovascular: Normal rate, regular rhythm, normal heart sounds and intact distal pulses. Exam reveals no gallop and no friction rub. No murmur heard. Pulmonary/Chest: No stridor. No respiratory distress. He has no wheezes. He has no rales. He exhibits no tenderness. Tachypneic, distant breath sounds. Abdominal: Soft. Bowel sounds are normal. He exhibits no distension and no mass. There is no tenderness. There is no rebound and no guarding. Musculoskeletal: Normal range of motion. He exhibits no edema or tenderness. Neurological: He is alert and oriented to person, place, and time. No cranial nerve deficit. Skin: Skin is warm and dry. No rash noted. He is not diaphoretic. No erythema. Psychiatric: He has a normal mood and affect. His behavior is normal. Judgment and thought content normal.         DIFFERENTIAL DIAGNOSIS/MDM:   Pt with COPD exacerbation vs Cardiac issues. Will get cardiac workup and respiratory treatments as well as steroids. Will reassess    Pt still tachypneic with increased work of breathing on walking. Will admit.      DIAGNOSTIC RESULTS     EKG: All EKG's are interpreted by the Emergency Department Physician who either signs or Co-signs this chart in the [address] a cardiologist.    EKG Interpretation    Interpreted by emergency department physician    Rhythm: normal sinus   Rate: normal  Axis: left  Ectopy: none  Conduction: normal  ST Segments: no acute change  T Waves: no acute change  Q Waves: none    Clinical Impression: non-specific EKG      Lilian Dejesus M.D.       RADIOLOGY:   I directly visualized the following  images and reviewed theradiologist interpretations:  Xr Chest Portable    Result Date: 3/28/2019  EXAMINATION: SINGLE XRAY VIEW OF THE CHEST 3/28/2019 12:41 pm COMPARISON: Chest radiograph performed 10/02/2018. HISTORY: ORDERING SYSTEM PROVIDED HISTORY: sob TECHNOLOGIST PROVIDED HISTORY: sob Ordering Physician Provided Reason for Exam: Shortness of breath Acuity: Acute Type of Exam: Initial Relevant Medical/Surgical History: COPD FINDINGS: There is no acute consolidation or effusion. There is no pneumothorax. The mediastinal structures are unremarkable. The upper abdomen is unremarkable. The extrathoracic soft tissues are unremarkable. There is no acute osseous abnormality. No acute cardiopulmonary process. ED BEDSIDE ULTRASOUND:   none    LABS:  Labs Reviewed   CBC WITH AUTO DIFFERENTIAL - Abnormal; Notable for the following components:       Result Value    RBC 4.35 (*)     Hemoglobin 12.5 (*)     Hematocrit 37.2 (*)     RDW 15.4 (*)     Seg Neutrophils 73 (*)     Lymphocytes 14 (*)     Monocytes 8 (*)     All other components within normal limits   BASIC METABOLIC PANEL - Abnormal; Notable for the following components:    BUN 41 (*)     CREATININE 1.67 (*)     Chloride 95 (*)     GFR Non- 43 (*)     GFR  52 (*)     All other components within normal limits   TROPONIN - Abnormal; Notable for the following components:    Troponin, High Sensitivity 29 (*)     All other components within normal limits   TROPONIN - Abnormal; Notable for the following components:    Troponin, High Sensitivity 23 (*)     All other components within normal limits   BRAIN NATRIURETIC PEPTIDE       As above, reviewed. Troponin improved.        EMERGENCY DEPARTMENT COURSE:   Vitals:    Vitals:    03/28/19 1103 03/28/19 1525   BP: (!) 139/59 (!) 143/68   Pulse: 89 90   Resp: 18 16   Temp: 98.6 °F (37 °C)    SpO2: 98% 95%   Weight: (!) 404 lb (183.3 kg)    Height: 6' (1.829 m)      As above, reviewed. CRITICAL CARE:  none    CONSULTS:  Medicine for admission. PROCEDURES:  none    FINAL IMPRESSION      1. COPD exacerbation ([address]    2. Shortness of breath          DISPOSITION/PLAN   Admitted. PATIENT REFERRED TO:  No follow-up provider specified.     DISCHARGE MEDICATIONS:     New Prescriptions    No medications on file       (Please note that portions of this note were completed with a voice recognition program.  Efforts were made to edit thedictations but occasionally words are mis-transcribed.)    Zeny Kwon MD  Emergency Medicine               Zeny Kwon MD  03/30/19 0227

## 2019-03-28 NOTE — PROGRESS NOTES
Medication History completed:    New medications: none    Medications discontinued: nicotine patches, lidocaine cream, gabapentin    Changes to dosing:  Metolazone changed to twice weekly  Humulin R U-500 increased to 250 units (0.5 mL) with breakfast and lunch and 175 units (0.35 mL) with dinner (patient uses U-100 syringe)    Stated allergies: As listed    Other pertinent information: Medications confirmed with [employer].     Thank you,  Al Mead, PharmD  [phone]

## 2019-03-29 ENCOUNTER — TELEPHONE (OUTPATIENT)
Dept: FAMILY MEDICINE CLINIC | Age: 54
End: 2019-03-29

## 2019-03-30 ASSESSMENT — ENCOUNTER SYMPTOMS
COUGH: 0
APNEA: 0
DIARRHEA: 0
EYE PAIN: 0
EYE DISCHARGE: 0
NAUSEA: 0
BLOOD IN STOOL: 0
VOMITING: 0
SHORTNESS OF BREATH: 1

## 2019-04-02 ENCOUNTER — HOSPITAL ENCOUNTER (OUTPATIENT)
Dept: PHARMACY | Age: 54
Setting detail: THERAPIES SERIES
Discharge: HOME | End: 2019-04-02
Payer: COMMERCIAL

## 2019-04-02 PROCEDURE — 99211 OFF/OP EST MAY X REQ PHY/QHP: CPT

## 2019-04-02 NOTE — PROGRESS NOTES
call for appt. Was previously seeing Dr. Marcelina Whaley. []  Missed doses   [x]  Emergency Room Visit    []  Medication changes  []  Hospitalization   []  Diet changes   []  Acute illness   []  Activity changes      Symptoms of hypoglycemia    [x]  None    []  Shakiness    []  Lightheadedness or dizziness   []  Confusion      []  Sweating   []  Other       Symptoms of hyperglycemia   [x]  None   []  Frequent urination    []  Increased thirst   []  Other    Medication adverse reactions   [x]  None   []  Diarrhea / Nausea / Vomiting / Constipation / flatulence   []  Hypertension   []  Peripheral edema     []  Signs of infection   []  Headache   []  Vision changes   []  Increased cholesterol    []  Weight gain   []  Change in renal function   []  Increased potassium  []  Other      Comments:  Patient takes Humulin R U500. Per notes from ED in [facility] patient is to be taking Humulin R U500 0.5ml with breakfast and lunch and 0.35 with dinner. Patient has blood sugar log with him and notes has been taking 0.45ml with breakfast and lunch and dinner. States no specific reason why he is not following instructions just that he sometimes likes to play doctor himself. Encouraged him to listed and follow physician instructions. Patient admits to being very anxious and depressed. Has had quite a bit of stress/hard times recently. Encouraged him to think about discussing with PCP and considering other options such as counselling to help him deal.  States the person he confided in most recently passed due to suicide. If recent hospital admission / ED visit, was this related to Diabetes No: COPD .  Discharge date 3/28/19    Objective     PMHx:    Past Medical History:   Diagnosis Date    Acute on chronic kidney failure ([facility] [address] 7/20/2017    Acute on chronic respiratory failure ([facility] [address] 10/2/2018    Adhesive capsulitis of left shoulder 3/25/2017    Anxiety 10/2/2016    Arthropathy, unspecified, other specified sites 6/13/2013 Kia Harper MD   albuterol (PROVENTIL) (2.5 MG/3ML) 0.083% nebulizer solution Take 3 mLs by nebulization every 6 hours as needed for Wheezing or Shortness of Breath 10/23/18  Yes Abdirahman Julian MD   pravastatin (PRAVACHOL) 40 MG tablet Take 1 tablet by mouth nightly 10/23/18  Yes Abdirahman Julian MD   fluticasone-salmeterol (ADVAIR HFA) 230-21 MCG/ACT inhaler Inhale 2 puffs into the lungs 2 times daily 10/8/18  Yes Abdirahman Julian MD   insulin regular human (HUMULIN R) 500 UNIT/ML concentrated injection vial Inject into the skin 3 times daily Indications: patient injects 0.5 mL for breakfast and lunch and 0.35 mL for dinner Inject 250 units with breakfast and lunch, and inject 175 units with dinner. Yes Historical Provider, MD   blood glucose test strips (ONE TOUCH ULTRA TEST) strip USE ONE STRIP TO TEST 3 TIMES A DAY 7/5/18  Yes Abdirahman Julian MD   nitroGLYCERIN (NITROSTAT) 0.4 MG SL tablet Place 1 tablet under the tongue every 5 minutes as needed for Chest pain 4/11/18  Yes Abdirahman Julian MD   fluticasone (CUTIVATE) 0.05 % cream Apply topically 2 times daily  4/19/17  Yes Historical Provider, MD   fluticasone (FLONASE) 50 MCG/ACT nasal spray 2 sprays by Nasal route daily 1/16/17  Yes Abdirahman Julian MD   Melatonin 10 MG TABS Take 10 mg by mouth nightly as needed (insomnia) 12/23/16  Yes Abdirahman Julian MD   COMFORT ASSIST INSULIN SYRINGE 31G X 5/16\" 1 ML [address]  7/13/16  Yes Historical Provider, MD   aspirin 81 MG EC tablet Take 81 mg by mouth daily. Yes Historical Provider, MD   Handicap Placard MISC by Does not apply route Can't walk greater than 200 feet. Expires in 5 years.  2/13/19   Abdirahman Julian MD       Immunizations:   Most Recent Immunizations   Administered Date(s) Administered    Influenza Virus Vaccine 10/12/2015    Influenza, Trung Roper, 3 yrs and older, IM, PF (Fluzone 3 yrs and older or Afluria 5 yrs and older) 09/18/2018    Pneumococcal Polysaccharide ([id number]) 05/23/2013    Tdap (Boostrix, Adacel) 09/12/2018       Home Blood Glucose Results:   Patient has blood glucose log with him today. Typical breakfast between 6:30/7am running  since being on current insulin dose. Lunch between 12-3pm running 165-240. Patient understands when he eats later his blood sugars are often higher due to insulin activity passing. Patient does not recall eating anything significant to cause elevation in BS other than two thin mint cookies. Dinner at [facility] [employer]. Patient to continue monitoring blood sugar and noting level as well as how much insulin he takes each day. Blood glucose trends noted: see above    Assessment     A1c at goal: No: 10.7 (1/23/19)  Blood Pressure at goal: No: 143/68 at ED on 3/28/19  Weight at goal: No: 404lbs  Physical activity: 0 minutes 0 times per week  Physical activity at goal: No: patient feels unable to exercise due to pain. Patient only takes Oxy IR and tizanidine for pain. States often waits several hours before taking in am due to not wanting to drive after taking. Does not take any other OTC pain meds. Suggested he try APAP in morning to help with pain relief until able to be home and take Oxy IR. Smoking Cessation: No: patient previously smoked 3pk/day and quit. Had a lot of stress and stared up again recently. Currently smoking 1pk/day. Does have nicotine patches at home although not using right now. Currently not ready to consider cessation. Will continue to encourage. Cholesterol at target: No: LDL 71, HDL28, TRIG 312 (9/20/18)  Annual eye exam completed: Yes  Comprehensive Foot Exam Completed:  Yes  Annual urine albumin and serum creatinine: Yes    Statin: Yes    Appropriate?: Yes  Changes made:     ACE/ARB: Yes  Appropriate?: Yes  Changes made:     Aspirin: Yes  Appropriate?: Yes  Changes made:     Eating patterns:    []  My plate    []  Mediterranean diet   []  Low sodium   []  DASH diet   [x]  Portion

## 2019-04-02 NOTE — PROGRESS NOTES
I will approach him regarding depression and anxiety again at the next appointment. Continue venlafaxine 100 MG twice a day for now. Thank you! PHQ Scores 3/15/2019 2/22/2019 10/23/2018 9/18/2018 8/18/2017 6/23/2017 9/23/2016   PHQ2 Score 6 2 6 3 2 5 2   PHQ9 Score 17 11 11 6 14 21 13     Interpretation of Total Score Depression Severity: 1-4 = Minimal depression, 5-9 = Mild depression, 10-14 = Moderate depression, 15-19 = Moderately severe depression, 20-27 = Severe depression    Future Appointments   Date Time Provider [place]   4/15/2019  6:30 AM [facility] CHF CLINIC RM 1 [facility] CHFCLIN None   4/15/2019  6:50 AM [facility] MEDICATION MGMT [facility] MED MGMT [facility]   6/19/2019  9:30 AM Paul Silva MD [id number]         Current Outpatient Medications on File Prior to Encounter   Medication Sig Dispense Refill    metolazone (ZAROXOLYN) 2.5 MG tablet Take 2.5 mg by mouth Twice a Week      SPIRIVA HANDIHALER 18 MCG inhalation capsule INHALE THE ENTIRE CONTENTS OF 1 CAPSULE ONCE A DAY USING HANDIHALER DEVICE 60 capsule 10    PROAIR  (90 Base) MCG/ACT inhaler INHALE TWO PUFFS BY MOUTH EVERY 6 HOURS AS NEEDED FOR WHEEZING OR SHORTNESS OF BREATH 1 Inhaler 4    spironolactone (ALDACTONE) 50 MG tablet Take 1 tablet by mouth daily 90 tablet 3    oxyCODONE HCl (OXY-IR) 10 MG immediate release tablet Take 1 tablet by mouth every 8 hours as needed for Pain for up to 30 days.  90 tablet 0    vitamin D (CHOLECALCIFEROL) 1000 UNIT TABS tablet Take 1 tablet by mouth daily 90 tablet 3    docusate sodium (STOOL SOFTENER) 100 MG capsule Take 1 capsule by mouth 2 times daily 180 capsule 3    venlafaxine (EFFEXOR) 100 MG tablet Take 1 tablet by mouth 2 times daily 60 tablet 3    tiZANidine (ZANAFLEX) 4 MG tablet TAKE ONE TABLET BY MOUTH EVERY 8 HOURS AS NEEDED FOR BACK PAIN 90 tablet 5    pantoprazole (PROTONIX) 40 MG tablet Take 1 tablet by mouth nightly 90 tablet 3    miconazole (MICOTIN) 2 % powder Apply topically 2 times daily. 45 g 1    Ferrous Sulfate (IRON) 325 (65 Fe) MG TABS Take 1 tablet by mouth daily 90 tablet 3    metoprolol tartrate (LOPRESSOR) 50 MG tablet Take 1 tablet by mouth 2 times daily 180 tablet 3    lisinopril (PRINIVIL;ZESTRIL) 5 MG tablet Take 1 tablet by mouth daily . Discontinued Lisinopril  10 mg 90 tablet 3    clopidogrel (PLAVIX) 75 MG tablet Take 1 tablet by mouth daily 90 tablet 2    bumetanide (BUMEX) 1 MG tablet Take 3 tablets by mouth 2 times daily 180 tablet 1    albuterol (PROVENTIL) (2.5 MG/3ML) 0.083% nebulizer solution Take 3 mLs by nebulization every 6 hours as needed for Wheezing or Shortness of Breath 120 vial 0    pravastatin (PRAVACHOL) 40 MG tablet Take 1 tablet by mouth nightly 90 tablet 0    fluticasone-salmeterol (ADVAIR HFA) 230-21 MCG/ACT inhaler Inhale 2 puffs into the lungs 2 times daily 12 g 5    insulin regular human (HUMULIN R) 500 UNIT/ML concentrated injection vial Inject into the skin 3 times daily Indications: patient injects 0.5 mL for breakfast and lunch and 0.35 mL for dinner Inject 250 units with breakfast and lunch, and inject 175 units with dinner.  blood glucose test strips (ONE TOUCH ULTRA TEST) strip USE ONE STRIP TO TEST 3 TIMES A  strip 3    nitroGLYCERIN (NITROSTAT) 0.4 MG SL tablet Place 1 tablet under the tongue every 5 minutes as needed for Chest pain 25 tablet 3    fluticasone (CUTIVATE) 0.05 % cream Apply topically 2 times daily       fluticasone (FLONASE) 50 MCG/ACT nasal spray 2 sprays by Nasal route daily 1 Bottle 3    Melatonin 10 MG TABS Take 10 mg by mouth nightly as needed (insomnia) 90 tablet 1    COMFORT ASSIST INSULIN SYRINGE 31G X 5/16\" 1 ML MISC       aspirin 81 MG EC tablet Take 81 mg by mouth daily.  Handicap Placard MISC by Does not apply route Can't walk greater than 200 feet. Expires in 5 years. 1 each 0     No current facility-administered medications on file prior to encounter.

## 2019-04-04 ENCOUNTER — PATIENT MESSAGE (OUTPATIENT)
Dept: FAMILY MEDICINE CLINIC | Age: 54
End: 2019-04-04

## 2019-04-04 DIAGNOSIS — M48.061 SPINAL STENOSIS OF LUMBAR REGION WITHOUT NEUROGENIC CLAUDICATION: ICD-10-CM

## 2019-04-04 DIAGNOSIS — I50.32 CHRONIC DIASTOLIC CONGESTIVE HEART FAILURE (HCC): ICD-10-CM

## 2019-04-04 DIAGNOSIS — M96.1 POSTLAMINECTOMY SYNDROME OF LUMBAR REGION: ICD-10-CM

## 2019-04-04 DIAGNOSIS — Z79.4 TYPE 2 DIABETES MELLITUS WITH DIABETIC POLYNEUROPATHY, WITH LONG-TERM CURRENT USE OF INSULIN (HCC): ICD-10-CM

## 2019-04-04 DIAGNOSIS — M54.42 CHRONIC MIDLINE LOW BACK PAIN WITH LEFT-SIDED SCIATICA: Primary | ICD-10-CM

## 2019-04-04 DIAGNOSIS — G47.01 INSOMNIA DUE TO MEDICAL CONDITION: ICD-10-CM

## 2019-04-04 DIAGNOSIS — N18.30 CKD (CHRONIC KIDNEY DISEASE) STAGE 3, GFR 30-59 ML/MIN (HCC): ICD-10-CM

## 2019-04-04 DIAGNOSIS — E11.42 TYPE 2 DIABETES MELLITUS WITH DIABETIC POLYNEUROPATHY, WITH LONG-TERM CURRENT USE OF INSULIN (HCC): ICD-10-CM

## 2019-04-04 DIAGNOSIS — M51.36 DDD (DEGENERATIVE DISC DISEASE), LUMBAR: ICD-10-CM

## 2019-04-04 DIAGNOSIS — G89.29 CHRONIC MIDLINE LOW BACK PAIN WITH LEFT-SIDED SCIATICA: Primary | ICD-10-CM

## 2019-04-04 NOTE — TELEPHONE ENCOUNTER
Please Approve or Refuse.   Send to Pharmacy per Pt's Request:      Next Visit Date:  6/19/2019  Last Visit Date: 12/23/2016    Hemoglobin A1C (%)   Date Value   01/18/2019 10.7   09/25/2018 9.2   06/29/2018 8.5             ( goal A1C is < 7)   BP Readings from Last 3 Encounters:   03/28/19 (!) 156/64   03/28/19 118/60   03/20/19 110/60          (goal 120/80)  BUN   Date Value Ref Range Status   03/28/2019 41 (H) 6 - 20 mg/dL Final     CREATININE   Date Value Ref Range Status   03/28/2019 1.67 (H) 0.70 - 1.20 mg/dL Final     Potassium   Date Value Ref Range Status   03/28/2019 4.1 3.7 - 5.3 mmol/L Final

## 2019-04-05 RX ORDER — OXYCODONE HYDROCHLORIDE 10 MG/1
10 TABLET ORAL EVERY 8 HOURS PRN
Qty: 90 TABLET | Refills: 0 | Status: SHIPPED | OUTPATIENT
Start: 2019-04-05 | End: 2019-05-01 | Stop reason: SDUPTHER

## 2019-04-15 ENCOUNTER — TELEPHONE (OUTPATIENT)
Dept: FAMILY MEDICINE CLINIC | Age: 54
End: 2019-04-15

## 2019-04-15 ENCOUNTER — HOSPITAL ENCOUNTER (OUTPATIENT)
Dept: OTHER | Age: 54
Discharge: HOME | End: 2019-04-15
Payer: COMMERCIAL

## 2019-04-15 ENCOUNTER — HOSPITAL ENCOUNTER (OUTPATIENT)
Age: 54
Setting detail: SPECIMEN
Discharge: HOME | End: 2019-04-15
Payer: COMMERCIAL

## 2019-04-15 ENCOUNTER — HOSPITAL ENCOUNTER (OUTPATIENT)
Dept: PHARMACY | Age: 54
Setting detail: THERAPIES SERIES
Discharge: HOME | End: 2019-04-15
Payer: COMMERCIAL

## 2019-04-15 ENCOUNTER — TELEPHONE (OUTPATIENT)
Dept: PHARMACY | Age: 54
End: 2019-04-15

## 2019-04-15 VITALS
HEART RATE: 100 BPM | WEIGHT: 315 LBS | RESPIRATION RATE: 20 BRPM | BODY MASS INDEX: 56.81 KG/M2 | OXYGEN SATURATION: 99 % | DIASTOLIC BLOOD PRESSURE: 64 MMHG | SYSTOLIC BLOOD PRESSURE: 128 MMHG

## 2019-04-15 DIAGNOSIS — F17.200 SMOKES AND MOTIVATED TO QUIT: ICD-10-CM

## 2019-04-15 DIAGNOSIS — J44.9 MIXED TYPE COPD (CHRONIC OBSTRUCTIVE PULMONARY DISEASE) (HCC): Primary | ICD-10-CM

## 2019-04-15 LAB
ESTIMATED AVERAGE GLUCOSE: 189 MG/DL
HBA1C MFR BLD: 8.2 % (ref 4–6)

## 2019-04-15 PROCEDURE — 83036 HEMOGLOBIN GLYCOSYLATED A1C: CPT

## 2019-04-15 PROCEDURE — 99211 OFF/OP EST MAY X REQ PHY/QHP: CPT

## 2019-04-15 PROCEDURE — 36415 COLL VENOUS BLD VENIPUNCTURE: CPT

## 2019-04-15 PROCEDURE — 99212 OFFICE O/P EST SF 10 MIN: CPT

## 2019-04-15 RX ORDER — VARENICLINE TARTRATE 25 MG
KIT ORAL
Qty: 53 EACH | Refills: 0 | Status: SHIPPED | OUTPATIENT
Start: 2019-04-15 | End: 2019-05-02 | Stop reason: SDUPTHER

## 2019-04-15 NOTE — TELEPHONE ENCOUNTER
I placed the orders. Thank you! Orders Placed This Encounter   Procedures   [address] Medication Mgmt (Smoking Cessation - Clinical Pharmacy) - Matthew Short     Referral Priority:   Routine     Referral Type:   Consult for Advice and Opinion     Referral Reason:   Specialty Services Required     Referred to Provider:   Lisbeth Shrestha [facility]     Requested Specialty:   Pharmacist     Number of Visits Requested:   1       Orders Placed This Encounter   Medications    Spacer/Aero Chamber Mouthpiece MISC     Si each by Does not apply route once as needed (to be used with his inhalers)     Dispense:  1 each     Refill:  0    varenicline (CHANTIX STARTING MONTH GUALBERTO) 0.5 MG X 11 & 1 MG X 42 tablet     Si.5 mg po daily x 3 days, 0.5 mg BID x 4 days, then 1 mg BID thereafter . Call for refill     Dispense:  53 each     Refill:  0

## 2019-04-15 NOTE — PROGRESS NOTES
Weight up. Pt didn't take any water pills yesterday. Will call Dr Manuela Shell and see if we can get pt an appointment. Dr Sarahy Butterfield was out of town and Dr Fidelina Capellan wouldn't see him because it has been 4 years. Will try to see if Dr Jacque Valentin will manage complete respiratory status. Verbally reviewed importance of medication compliance with patient; patient verbalized understanding. Discussed 2000mg/day sodium restricted diet; patient verbalized understanding. Moderate daily exercise encouraged as tolerated. Discussed rest breaks as needed; patient verbalized understanding. Patient instructed to weigh self at the same time of each day, using same clothes and same scale; reinforced teaching to monitor for 3-5 lb weight increase over 1-2 days, and to notify the CHF clinic at [phone] or physician office if weight change noted. Patient verbalized understanding. Risks of smoking discussed with the patient if applicable; patient strongly discouraged to smoke. Patient verbalized understanding. Signs and symptoms of CHF discussed with patient, such as feeling more tired than normal, feeling short of breath, coughing that increases when you lie down, sudden weight gain, swelling of your feet, legs or belly. Patient verbalized understanding to notify the CHF clinic at [phone] or physician office if these symptoms occur. Compliance with plan of care and further disease process causes discussed with patient, patient encouraged to keep all follow up appointments. Patient verbalized understanding.

## 2019-04-15 NOTE — PROGRESS NOTES
patient has still not seen a pulmonologist.  Did call Dr. Yamileth Pelaez but unable to get an appt as he has not been in for an appt in last 4 yrs. Also sees Dr. Jesús Mcgee (pulmonologist) for sleep apnea. Call being made to get him in for appt. Discussed proper inhaler use with patient who admits he has difficulty timing his breaths with activating inhaler. Does not have spacer at home but has used in the hospital and states he thinks it works better. Will get spacer ordered for patient. []  Missed doses   []  Emergency Room Visit    []  Medication changes  []  Hospitalization   []  Diet changes   []  Acute illness   []  Activity changes      Symptoms of hypoglycemia - has had a few lower BS with one at 70 and two at 77 in last week. States it happens when he does not snack in middle of night. Trying to cut down on this habit. Will not change insulin dosing now as other fasting blood sugars are 80's-110 but will monitor and decrease if needed. Patient does carry glucose tabs with him and understands how to use. []  None    [x]  Shakiness    []  Lightheadedness or dizziness   []  Confusion      []  Sweating   []  Other       Symptoms of hyperglycemia   [x]  None   []  Frequent urination    []  Increased thirst   []  Other    Medication adverse reactions   [x]  None   []  Diarrhea / Nausea / Vomiting / Constipation / flatulence   []  Hypertension   []  Peripheral edema     []  Signs of infection   []  Headache   []  Vision changes   []  Increased cholesterol    []  Weight gain   []  Change in renal function   []  Increased potassium  []  Other      Comments:  Patient takes Humulin R U500. Patient has been taking Humulin R U500 0.5ml with breakfast and lunch and 0.45 with dinner. Patient continues to be very stressed but appears better today. If recent hospital admission / ED visit, was this related to Diabetes No: COPD .  Discharge date 3/28/19    Objective     PMHx:    Past Medical History:   Diagnosis Date    Acute on chronic kidney failure ([address] 7/20/2017    Acute on chronic respiratory failure ([address] 10/2/2018    Adhesive capsulitis of left shoulder 3/25/2017    Anxiety 10/2/2016    Arthropathy, unspecified, other specified sites 6/13/2013    Asthma     Bilateral lower leg cellulitis 12/24/2016    Bilateral lower leg cellulitis 7/5/2017    Bilateral lower leg cellulitis 2/17/2016    Blood in stool     CAD (coronary artery disease)     Cellulitis of both lower extremities 5/25/2017    Cellulitis of leg, left 7/20/2017    CHF (congestive heart failure), NYHA class III ([facility]) 8/14/2013    Chronic back pain     Chronic headaches     was referred to neuro, testing scheduled    Chronic kidney disease     Chronic ulcer of left leg, with fat layer exposed ([address] 2/22/2019    COPD (chronic obstructive pulmonary disease) ([address]     COPD exacerbation ([address] 11/2/2016    Diabetic neuropathy ([facility]) 8/14/2013    Displacement of lumbar intervertebral disc without myelopathy 6/13/2013    Ear infection     RIGHT    Facial cellulitis 2012    Fall 3/25/2017    GERD (gastroesophageal reflux disease)     Head injury     Hearing loss in right ear     pencil pierced ear as a child    Hepatic steatosis 12/3/2015    History of general anesthesia complication     has woke up during surgery under anesthesia    History of rib fracture 12/3/2015    Chronic     Hyperlipidemia     Hypertension     Insomnia     Intolerance of continuous positive airway pressure (CPAP) ventilation 7/20/2017    Mastoiditis of left side     Mixed conductive and sensorineural hearing loss of both ears 1/10/2017    Per ENT    Mixed type COPD (chronic obstructive pulmonary disease) ([address]     Moderate recurrent major depression ([address] 10/2/2016    Morbid obesity with BMI of 45.0-49.9, adult ([address] 6/16/2015    Neuropathy     Obesity     Open wound of groin 12/19/2018    BARBY on CPAP     Osteoarthritis     Otitis externa of left Last 3 Encounters:   04/15/19 (!) 418 lb 14.4 oz (190 kg)   03/28/19 (!) 404 lb (183.3 kg)   03/28/19 (!) 404 lb (183.3 kg)       Current medications:  Prior to Admission medications    Medication Sig Start Date End Date Taking? Authorizing Provider   Spacer/Aero Chamber Mouthpiece MISC 1 each by Does not apply route once as needed (to be used with his inhalers) 4/15/19 4/15/19 Yes Sujatha Law MD   varenicline (CHANTIX STARTING MONTH PAK) 0.5 MG X 11 & 1 MG X 42 tablet 0.5 mg po daily x 3 days, 0.5 mg BID x 4 days, then 1 mg BID thereafter . Call for refill 4/15/19  Yes Sujatha Law MD   oxyCODONE HCl (OXY-IR) 10 MG immediate release tablet Take 1 tablet by mouth every 8 hours as needed for Pain for up to 30 days. 4/5/19 5/5/19 Yes Sujatha Law MD   blood glucose test strips (ONE TOUCH ULTRA TEST) strip USE ONE STRIP TO TEST THREE TIMES A DAY 4/4/19  Yes Sujatha Law MD   metolazone (ZAROXOLYN) 2.5 MG tablet Take 2.5 mg by mouth Twice a Week   Yes Historical Provider, MD Jorje Jacobs 18 MCG inhalation capsule INHALE THE ENTIRE CONTENTS OF 1 CAPSULE ONCE A DAY USING HANDIHALER DEVICE 3/27/19  Yes Nely Rubin MD   PROAIR  (95 Base) MCG/ACT inhaler INHALE TWO PUFFS BY MOUTH EVERY 6 HOURS AS NEEDED FOR WHEEZING OR SHORTNESS OF BREATH 3/27/19  Yes Nely Rubin MD   spironolactone (ALDACTONE) 50 MG tablet Take 1 tablet by mouth daily 3/18/19  Yes Sujatha Law MD   vitamin D (CHOLECALCIFEROL) 1000 UNIT TABS tablet Take 1 tablet by mouth daily 2/25/19  Yes Sujatha Law MD   docusate sodium (STOOL SOFTENER) 100 MG capsule Take 1 capsule by mouth 2 times daily 2/25/19  Yes Sujatha Law MD   venlafaxine (EFFEXOR) 100 MG tablet Take 1 tablet by mouth 2 times daily 2/18/19  Yes Sujatha Law MD   Handgarima Placard MISC by Does not apply route Can't walk greater than 200 feet. Expires in 5 years.  2/13/19  Yes MD Ventura Torres (Mimi Perez) 4 MG tablet TAKE ONE TABLET BY MOUTH EVERY 8 HOURS AS NEEDED FOR BACK PAIN 1/23/19  Yes Stef Hawkins MD   pantoprazole (PROTONIX) 40 MG tablet Take 1 tablet by mouth nightly 1/21/19  Yes Stef Hawkins MD   miconazole (MICOTIN) 2 % powder Apply topically 2 times daily. 12/20/18  Yes Eleni Serrato MD   Ferrous Sulfate (IRON) 325 (65 Fe) MG TABS Take 1 tablet by mouth daily 11/19/18  Yes Stef Hawkins MD   metoprolol tartrate (LOPRESSOR) 50 MG tablet Take 1 tablet by mouth 2 times daily 11/19/18  Yes Stef Hawkins MD   lisinopril (PRINIVIL;ZESTRIL) 5 MG tablet Take 1 tablet by mouth daily . Discontinued Lisinopril  10 mg 11/5/18  Yes Stef Hawkins MD   clopidogrel (PLAVIX) 75 MG tablet Take 1 tablet by mouth daily 11/5/18  Yes Stef Hawkins MD   bumetanide (BUMEX) 1 MG tablet Take 3 tablets by mouth 2 times daily 10/31/18  Yes Dariana Victoria MD   albuterol (PROVENTIL) (2.5 MG/3ML) 0.083% nebulizer solution Take 3 mLs by nebulization every 6 hours as needed for Wheezing or Shortness of Breath 10/23/18  Yes Stef Hawkins MD   pravastatin (PRAVACHOL) 40 MG tablet Take 1 tablet by mouth nightly 10/23/18  Yes Stef Hawkins MD   fluticasone-salmeterol (ADVAIR HFA) 230-21 MCG/ACT inhaler Inhale 2 puffs into the lungs 2 times daily 10/8/18  Yes Stef Hawkins MD   insulin regular human (HUMULIN R) 500 UNIT/ML concentrated injection vial Inject into the skin 3 times daily Indications: patient injects 0.5 mL for breakfast and lunch and 0.35 mL for dinner Inject 250 units with breakfast and lunch, and inject 175 units with dinner.    Yes Stewart Henriquez MD   nitroGLYCERIN (NITROSTAT) 0.4 MG SL tablet Place 1 tablet under the tongue every 5 minutes as needed for Chest pain 4/11/18  Yes Stef Hawkins MD   fluticasone (CUTIVATE) 0.05 % cream Apply topically 2 times daily  4/19/17  Yes Historical Provider, MD   fluticasone (FLONASE) 50 MCG/ACT nasal spray 2 sprays by Nasal route daily 1/16/17  Yes Atif Vega MD   Melatonin 10 MG TABS Take 10 mg by mouth nightly as needed (insomnia) 12/23/16  Yes Atif Vega MD   COMFORT ASSIST INSULIN SYRINGE 31G X 5/16\" 1 ML MISC  7/13/16  Yes Historical Provider, MD   aspirin 81 MG EC tablet Take 81 mg by mouth daily. Yes Historical Provider, MD       Immunizations:   Most Recent Immunizations   Administered Date(s) Administered    Influenza Virus Vaccine 10/12/2015    Influenza, Liliana Linear, 3 yrs and older, IM, PF (Fluzone 3 yrs and older or Afluria 5 yrs and older) 09/18/2018    Pneumococcal Polysaccharide ([id number]) 05/23/2013    Tdap (Boostrix, Adacel) 09/12/2018       Home Blood Glucose Results:   Patient has blood glucose log with him today. Typical breakfast between 6:30/7am running  since being on current insulin dose. Lunch between 12-3pm running . Dinner at [employer]. Patient to continue monitoring blood sugar and noting level as well as how much insulin he takes each day. Will call if he has any blood sugars below 70 or two days in a row with blood sugars in 70's     Blood glucose trends noted: see above    Assessment     A1c at goal: No: 10.7 (1/23/19)  Blood Pressure at goal: No: 143/68 at ED on 3/28/19  Weight at goal: No: 418lbs  Physical activity: 0 minutes 0 times per week  Physical activity at goal: No: patient feels unable to exercise due to pain. Patient only takes Oxy IR and tizanidine for pain. States often waits several hours before taking in am due to not wanting to drive after taking. Does not take any other OTC pain meds. Suggested he try APAP in morning to help with pain relief until able to be home and take Oxy IR. Smoking Cessation: No: Currently smoking 2 pk/day. Patient motivated to quit smoking. Has used both nicotine patches and Chantix in past.  Has always had trouble getting nicotine patches to stay on and would like to try Chantix.   Dr. Marlene Patterson contacted and referral received for smoking cessation. RX for Chantix sent to pharmacy. Patient will start today with goal to quit smoking on Monday 4/22. Counseled patient on tapering smoking cessation as well as making stop date. Patient prefers to just quit and decides on 4/22 as stop date. Cholesterol at target: No: LDL 71, HDL28, TRIG 312 (9/20/18)  Annual eye exam completed: Yes  Comprehensive Foot Exam Completed: Yes  Annual urine albumin and serum creatinine: Yes    Statin: Yes    Appropriate?: Yes  Changes made:     ACE/ARB: Yes  Appropriate?: Yes  Changes made:     Aspirin: Yes  Appropriate?: Yes  Changes made:     Eating patterns:    []  My plate    []  Mediterranean diet   []  Low sodium   []  DASH diet   [x]  Portion control   [x]  Reduced calorie    []  Fast food / Restaurant  []  High glycemic index foods   []  Sugary beverages   []  Other     Comment: Patient has gone to diabetic education and seen a dietician. Does not feel he always eats well. Breakfast is usually oatmeal or cherrios with no added sugar. Lunch is typically homemade soup or salad. Dinner is usually venison, turkey or fish. Does not eat candy or pastries (very rare). Does eat potatoes or pasta every day and understands this may be what is causing increase in blood sugar. Eating a lot more vegetables and bought smaller plates which is helping with portion control. Current Medications Affecting Diabetes:  Humulin R 500    Compliant: no  Barriers to medication therapy: none    Medications attempted in the past:  Metformin - cardiologist took him off but patient unsure why  Januvia - PCP took him off but patient unsure why    Recent exacerbations / new problems:  Continued elevated A1C    Last office dictation reviewed: yes        type 2 DM under poor control as evidenced by A1C 10.7. Plan     Initial diabetic education materials given to patient including the following:    * Blood Glucose Log   * Your Guide to a

## 2019-04-15 NOTE — PROGRESS NOTES
I placed new orders for him for smoking cessation referral, Chantix, and chamber to use with his inhalers. Thank you!

## 2019-04-26 ENCOUNTER — OFFICE VISIT (OUTPATIENT)
Dept: PULMONOLOGY | Age: 54
End: 2019-04-26
Payer: COMMERCIAL

## 2019-04-26 VITALS
SYSTOLIC BLOOD PRESSURE: 105 MMHG | RESPIRATION RATE: 16 BRPM | BODY MASS INDEX: 42.66 KG/M2 | HEART RATE: 82 BPM | WEIGHT: 315 LBS | OXYGEN SATURATION: 96 % | HEIGHT: 72 IN | DIASTOLIC BLOOD PRESSURE: 60 MMHG

## 2019-04-26 DIAGNOSIS — J96.10 CHRONIC RESPIRATORY FAILURE, UNSPECIFIED WHETHER WITH HYPOXIA OR HYPERCAPNIA (HCC): ICD-10-CM

## 2019-04-26 DIAGNOSIS — E66.01 CLASS 3 SEVERE OBESITY DUE TO EXCESS CALORIES WITH SERIOUS COMORBIDITY IN ADULT, UNSPECIFIED BMI (HCC): ICD-10-CM

## 2019-04-26 DIAGNOSIS — I10 ESSENTIAL HYPERTENSION: ICD-10-CM

## 2019-04-26 DIAGNOSIS — G47.33 OBSTRUCTIVE SLEEP APNEA: ICD-10-CM

## 2019-04-26 DIAGNOSIS — Z99.89 OSA ON CPAP: ICD-10-CM

## 2019-04-26 DIAGNOSIS — G47.33 OSA TREATED WITH BIPAP: ICD-10-CM

## 2019-04-26 DIAGNOSIS — K21.9 GASTROESOPHAGEAL REFLUX DISEASE WITHOUT ESOPHAGITIS: ICD-10-CM

## 2019-04-26 DIAGNOSIS — J44.9 MIXED TYPE COPD (CHRONIC OBSTRUCTIVE PULMONARY DISEASE) (HCC): ICD-10-CM

## 2019-04-26 DIAGNOSIS — G47.33 OSA ON CPAP: ICD-10-CM

## 2019-04-26 DIAGNOSIS — Z79.4 TYPE 2 DIABETES MELLITUS WITH DIABETIC POLYNEUROPATHY, WITH LONG-TERM CURRENT USE OF INSULIN (HCC): Primary | ICD-10-CM

## 2019-04-26 DIAGNOSIS — E11.42 TYPE 2 DIABETES MELLITUS WITH DIABETIC POLYNEUROPATHY, WITH LONG-TERM CURRENT USE OF INSULIN (HCC): Primary | ICD-10-CM

## 2019-04-26 PROCEDURE — G8926 SPIRO NO PERF OR DOC: HCPCS | Performed by: INTERNAL MEDICINE

## 2019-04-26 PROCEDURE — 3045F PR MOST RECENT HEMOGLOBIN A1C LEVEL 7.0-9.0%: CPT | Performed by: INTERNAL MEDICINE

## 2019-04-26 PROCEDURE — 3017F COLORECTAL CA SCREEN DOC REV: CPT | Performed by: INTERNAL MEDICINE

## 2019-04-26 PROCEDURE — G8427 DOCREV CUR MEDS BY ELIG CLIN: HCPCS | Performed by: INTERNAL MEDICINE

## 2019-04-26 PROCEDURE — G8417 CALC BMI ABV UP PARAM F/U: HCPCS | Performed by: INTERNAL MEDICINE

## 2019-04-26 PROCEDURE — 1036F TOBACCO NON-USER: CPT | Performed by: INTERNAL MEDICINE

## 2019-04-26 PROCEDURE — G8598 ASA/ANTIPLAT THER USED: HCPCS | Performed by: INTERNAL MEDICINE

## 2019-04-26 PROCEDURE — 99214 OFFICE O/P EST MOD 30 MIN: CPT | Performed by: INTERNAL MEDICINE

## 2019-04-26 PROCEDURE — 2022F DILAT RTA XM EVC RTNOPTHY: CPT | Performed by: INTERNAL MEDICINE

## 2019-04-26 PROCEDURE — 3023F SPIROM DOC REV: CPT | Performed by: INTERNAL MEDICINE

## 2019-04-26 ASSESSMENT — SLEEP AND FATIGUE QUESTIONNAIRES
HOW LIKELY ARE YOU TO NOD OFF OR FALL ASLEEP IN A CAR, WHILE STOPPED FOR A FEW MINUTES IN TRAFFIC: 0
HOW LIKELY ARE YOU TO NOD OFF OR FALL ASLEEP WHILE SITTING AND READING: 1
HOW LIKELY ARE YOU TO NOD OFF OR FALL ASLEEP WHILE SITTING AND TALKING TO SOMEONE: 0
HOW LIKELY ARE YOU TO NOD OFF OR FALL ASLEEP WHEN YOU ARE A PASSENGER IN A CAR FOR AN HOUR WITHOUT A BREAK: 0
HOW LIKELY ARE YOU TO NOD OFF OR FALL ASLEEP WHILE WATCHING TV: 2
ESS TOTAL SCORE: 7
HOW LIKELY ARE YOU TO NOD OFF OR FALL ASLEEP WHILE SITTING QUIETLY AFTER LUNCH WITHOUT ALCOHOL: 1
HOW LIKELY ARE YOU TO NOD OFF OR FALL ASLEEP WHILE SITTING INACTIVE IN A PUBLIC PLACE: 0
HOW LIKELY ARE YOU TO NOD OFF OR FALL ASLEEP WHILE LYING DOWN TO REST IN THE AFTERNOON WHEN CIRCUMSTANCES PERMIT: 3

## 2019-04-26 NOTE — PROGRESS NOTES
Miguel Chandler.  4/26/2019      Miguel Chandler. presented today for follow-up of chronic obstructive lung disease due to chronic bronchitis and emphysema. He maintained treated with bronchodilators, which she does use at his regularly and benefits from it. The no evidence of acute exacerbation of COPD, no increase in dyspnea, cough or sputum production. No hemoptysis. No chest pain. The color of and volume of the sputum is unchanged. Patient also known to have obstructive sleep apnea syndrome, which is being treated with BiPAP. He uses a BiPAP regularly. He was given Xanax which has helped him to improve his tolerance to BiPAP. He continued to be morbidly obese and has not been able to lose much weight, although he is trying to do so. He has systemic hypertension which is responding well to the present regimen. He also has diabetes which is also under good control. He has home oxygen which she does use at night. Sleep Medicine 4/26/2019 11/9/2017 9/7/2017   Sitting and reading 1 1 0   Watching TV 2 3 1   Sitting, inactive in a public place (e.g. a theatre or a meeting) 0 2 1   As a passenger in a car for an hour without a break 0 0 1   Lying down to rest in the afternoon when circumstances permit 3 2 2   Sitting and talking to someone 0 0 1   Sitting quietly after a lunch without alcohol 1 1 2   In a car, while stopped for a few minutes in traffic 0 0 0   Total score 7 9 8   Neck circumference - - 57   And upper sleepiness scale given to the patient the office revealed a score 07 indicating lack of sleepiness. .  He has no pedal edema.   No other symptoms of heart failure    Immunization   Immunization History   Administered Date(s) Administered    Influenza Virus Vaccine 12/16/2013, 10/23/2014, 10/12/2015    Influenza, Karla Gonzales, 3 yrs and older, IM, PF (Fluzone 3 yrs and older or Afluria 5 yrs and older) 10/11/2016, 11/04/2017, 09/18/2018    Pneumococcal Polysaccharide ([id number]) 05/23/2013  Tdap (Boostrix, Adacel) 09/12/2018        Pneumococcal Vaccine     [x] Up to date    [] Indicated   [] Refused  [] Contraindicated       Influenza Vaccine   [x] Up to date    [] Indicated   [] Refused  [] Contraindicated       REVIEW OF SYSTEMS: Review is assistant were conductive for all other system including upper and lower extremities and no additional information was obtained. Review of Systems -  General ROS: negative for - chills, fatigue, fever or weight loss  ENT ROS: negative for - headaches, oral lesions or sore throat  Cardiovascular ROS: no chest pain , orthopnea or pnd   Gastrointestinal ROS: no abdominal pain, change in bowel habits, or black or bloody stools  Skin - no rash   Neuro - no blurry vision , no loc . No focal weakness   msk - no jt tenderness or swelling    Vascular - no claudication , rest completed and negative   Lymphatic - complete and negative   Hematology - oncology - complete and negative   Allergy immunology - complete and negative    no burning or hematuria   Upper Extremities  Lower Extremities      Current Outpatient Medications   Medication Sig Dispense Refill    Lidocaine 4 % LOTN Apply topically      oxyCODONE HCl (OXY-IR) 10 MG immediate release tablet Take 1 tablet by mouth every 8 hours as needed for Pain for up to 30 days.  90 tablet 0    blood glucose test strips (ONE TOUCH ULTRA TEST) strip USE ONE STRIP TO TEST THREE TIMES A  strip 2    metolazone (ZAROXOLYN) 2.5 MG tablet Take 2.5 mg by mouth Twice a Week      SPIRIVA HANDIHALER 18 MCG inhalation capsule INHALE THE ENTIRE CONTENTS OF 1 CAPSULE ONCE A DAY USING HANDIHALER DEVICE 60 capsule 10    PROAIR  (90 Base) MCG/ACT inhaler INHALE TWO PUFFS BY MOUTH EVERY 6 HOURS AS NEEDED FOR WHEEZING OR SHORTNESS OF BREATH 1 Inhaler 4    spironolactone (ALDACTONE) 50 MG tablet Take 1 tablet by mouth daily 90 tablet 3    vitamin D (CHOLECALCIFEROL) 1000 UNIT TABS tablet Take 1 tablet by mouth daily 90 tablet 3    docusate sodium (STOOL SOFTENER) 100 MG capsule Take 1 capsule by mouth 2 times daily 180 capsule 3    Handicap Placard MISC by Does not apply route Can't walk greater than 200 feet. Expires in 5 years. 1 each 0    tiZANidine (ZANAFLEX) 4 MG tablet TAKE ONE TABLET BY MOUTH EVERY 8 HOURS AS NEEDED FOR BACK PAIN 90 tablet 5    pantoprazole (PROTONIX) 40 MG tablet Take 1 tablet by mouth nightly 90 tablet 3    miconazole (MICOTIN) 2 % powder Apply topically 2 times daily. 45 g 1    Ferrous Sulfate (IRON) 325 (65 Fe) MG TABS Take 1 tablet by mouth daily 90 tablet 3    metoprolol tartrate (LOPRESSOR) 50 MG tablet Take 1 tablet by mouth 2 times daily 180 tablet 3    lisinopril (PRINIVIL;ZESTRIL) 5 MG tablet Take 1 tablet by mouth daily . Discontinued Lisinopril  10 mg 90 tablet 3    clopidogrel (PLAVIX) 75 MG tablet Take 1 tablet by mouth daily 90 tablet 2    bumetanide (BUMEX) 1 MG tablet Take 3 tablets by mouth 2 times daily 180 tablet 1    albuterol (PROVENTIL) (2.5 MG/3ML) 0.083% nebulizer solution Take 3 mLs by nebulization every 6 hours as needed for Wheezing or Shortness of Breath 120 vial 0    pravastatin (PRAVACHOL) 40 MG tablet Take 1 tablet by mouth nightly 90 tablet 0    fluticasone-salmeterol (ADVAIR HFA) 230-21 MCG/ACT inhaler Inhale 2 puffs into the lungs 2 times daily 12 g 5    insulin regular human (HUMULIN R) 500 UNIT/ML concentrated injection vial Inject into the skin 3 times daily Indications: patient injects 0.5 mL for breakfast and lunch and 0.35 mL for dinner Inject 250 units with breakfast and lunch, and inject 175 units with dinner.       nitroGLYCERIN (NITROSTAT) 0.4 MG SL tablet Place 1 tablet under the tongue every 5 minutes as needed for Chest pain 25 tablet 3    fluticasone (CUTIVATE) 0.05 % cream Apply topically 2 times daily       fluticasone (FLONASE) 50 MCG/ACT nasal spray 2 sprays by Nasal route daily 1 Bottle 3    Melatonin 10 MG TABS Take 10 mg by mouth nightly as needed (insomnia) 90 tablet 1    COMFORT ASSIST INSULIN SYRINGE 31G X 5/16\" 1 ML MISC       aspirin 81 MG EC tablet Take 81 mg by mouth daily.  Spacer/Aero Chamber Mouthpiece MISC 1 each by Does not apply route once as needed (to be used with his inhalers) 1 each 0    varenicline (CHANTIX STARTING MONTH PAK) 0.5 MG X 11 & 1 MG X 42 tablet 0.5 mg po daily x 3 days, 0.5 mg BID x 4 days, then 1 mg BID thereafter . Call for refill 53 each 0    venlafaxine (EFFEXOR) 100 MG tablet Take 1 tablet by mouth 2 times daily 60 tablet 3     No current facility-administered medications for this visit.         Allergies   Allergen Reactions    Lorazepam      Falls      Nsaids      CHF&CKD    Levofloxacin Nausea And Vomiting     duplicate     Levofloxacin Nausea And Vomiting       Immunization History   Administered Date(s) Administered    Influenza Virus Vaccine 12/16/2013, 10/23/2014, 10/12/2015    Influenza, Clifm Gens, 3 yrs and older, IM, PF (Fluzone 3 yrs and older or Afluria 5 yrs and older) 10/11/2016, 11/04/2017, 09/18/2018    Pneumococcal Polysaccharide ([id number]) 05/23/2013    Tdap (Boostrix, Adacel) 09/12/2018        PAST MEDICAL HISTORY:         Diagnosis Date    Acute bronchitis with chronic obstructive pulmonary disease (COPD) ([address]     Acute on chronic kidney failure ([facility] [address] 7/20/2017    Acute on chronic respiratory failure ([facility] [address] 10/2/2018    Adhesive capsulitis of left shoulder 3/25/2017    Anxiety 10/2/2016    Arthropathy, unspecified, other specified sites 6/13/2013    Asthma     Bilateral lower leg cellulitis 12/24/2016    Bilateral lower leg cellulitis 7/5/2017    Bilateral lower leg cellulitis 2/17/2016    Blood in stool     CAD (coronary artery disease)     Cellulitis of both lower extremities 5/25/2017    Cellulitis of leg, left 7/20/2017    CHF (congestive heart failure), NYHA class III ([address] 8/14/2013    Chronic back pain     Chronic headaches     was referred to 2017    Tinnitus of both ears 1/10/2017    Per ENT    Type 2 diabetes mellitus with stage 3 chronic kidney disease, with long-term current use of insulin [facility] 2016    due to underlying condition with hyperosmolarity without coma    Type II or unspecified type diabetes mellitus without mention of complication, not stated as uncontrolled     uncontrolled    Wears glasses     for reading    Wound of left leg 2019       FAMILY HISTORY:       Problem Relation Age of Onset    Heart Disease Mother          age 64 from MI   Paula Jacqueline High Blood Pressure Mother     Diabetes Mother     High Blood Pressure Father          age 80 from CKD and Lung Fibrosis    Kidney Disease Father     Heart Disease Sister     Heart Attack Sister     Obesity Sister     Diabetes Sister        SURGICAL HISTORY:   Past Surgical History:   Procedure Laterality Date    BACK SURGERY   (x 4) ,.2011.2012     Dr Rg Rodriguez last 2 surg    CARDIAC CATHETERIZATION  2018    no stenting    COLONOSCOPY  11/3/2015    hemorrhoids, poor prep    CORONARY ANGIOPLASTY WITH STENT PLACEMENT  March 2013    x 1    HAND TENDON SURGERY Left     thumb tendon repair    KNEE ARTHROSCOPY Left     NERVE BLOCK  07-31-15    TENS unit    ID ESOPHAGOGASTRODUODENOSCOPY TRANSORAL DIAGNOSTIC N/A 2018    EGD ESOPHAGOGASTRODUODENOSCOPY performed by Caleb Gates MD at [facility] Bilateral 2012    Dr Adrianna Chavez  2013    normal     Social History     Tobacco Use   Smoking Status Former Smoker    Packs/day: 2.00    Years: 33.00    Pack years: 66.00    Types: Cigarettes    Start date: 1985    Last attempt to quit: 2019   Smokeless Tobacco Former User    Types: Snuff    Quit date: 1995   Tobacco Comment    pt quit but started again current 1pk/day         PHYSICAL EXAM:   General Appearance:    Alert, cooperative, no distress, appears stated age, Morbid obesity     Head:    Normocephalic, without obvious abnormality, atraumatic No Alex's syndrome. Short fat neck     Neck:   Supple, symmetrical, trachea midline, no adenopathy;     thyroid:  no enlargement/tenderness/nodules; no carotid    bruit or JVD     Back:     Symmetric, no curvature, ROM normal, no CVA tenderness     Lungs:     Clear to auscultation bilaterally, respirations unlabored no     dullness no bb, no wheezing no rales, vent all lobes,     diaphram motion normal .  Prolonged expiration     Chest Wall:    No tenderness or deformity      Heart:    Regular rate and rhythm, S1 and S2 normal, no murmur, rub     or gallop no rvh, P2 normal                          Abdomen:                              Pulses:                                 Lymph nodes:                    Neurologic:                  Soft, non-tender, bowel sounds active all four quadrants,     no masses, no organomegaly     2+ and symmetric all extremities      Cervical, supraclavicular not enlarged or matted or tender      CNII-XII intact, normal strength 5/5 . Sensation grossly normal  and reflexes normal 2+ throughout        Extremities - Clubbing - no    Edema - no    DVT - no    Skin changes - no    Pulses normal       Musculoskeletal - no joint swelling or tenderness or synovitis        /60 (Site: Right Lower Arm)   Pulse 82   Resp 16   Ht 6' (1.829 m)   Wt (!) 404 lb (183.3 kg)   SpO2 96% Comment: Room air at rest  BMI 54.79 kg/m²     Results for orders placed or performed during the hospital encounter of 04/15/19   Hemoglobin A1C   Result Value Ref Range    Hemoglobin A1C 8.2 (H) 4.0 - 6.0 %    Estimated Avg Glucose 189 mg/dL       Xray Chest:  No recent chest X4. Recent CT scan        Echocardiogram:  No recent echocardiogram.  No PFT          PFT:        ASSESSMENT:   Diagnosis Orders   1.  Type 2 diabetes mellitus with diabetic polyneuropathy, with long-term current use of insulin ([address] 2. BARBY on CPAP     3. Class 3 severe obesity due to excess calories with serious comorbidity in adult, unspecified BMI ([address]     4. Essential hypertension     5. Chronic respiratory failure, unspecified whether with hypoxia or hypercapnia ([address]     6. Gastroesophageal reflux disease without esophagitis     7. Obstructive sleep apnea     8. Mixed type COPD (chronic obstructive pulmonary disease) ([address]     9. BARBY treated with BiPAP          PLAN:  Patient has COPD which is responding well to the bronchodilators. I advised her to continue the use of the bronchodilators as before. I advised her to start the supplemental oxygen and during the night and broke it up into his the BiPAP. He was educated regarding the way to connect the oxygen to the BiPAP machine. He'll continue the use of BiPAP for the treatment of sleep apnea. And no evidence of any clinical heart failure. No evidence of any acute exacerbation of COPD    To continue treatment of hypertension. Her present regimen. He'll continue treatment of diabetes as well. His sleep apnea responding well to the use of BiPAP and he'll continue the same. Patient already had a Pneumovax and influenza vaccine. He will continue to be screen for lung cancer. Dictated by Dr. Lazarus Banister, M.D. dictation over thank you          Electronically signed by Tanner Guerrero MD on 4/26/2019 at 1:08 PM    Please note that this chart was generated using voice recognition Dragon dictation software. Although every effort was made to ensure the accuracy of this automated transcription, some errors in transcription may have occurred.

## 2019-05-01 ENCOUNTER — PATIENT MESSAGE (OUTPATIENT)
Dept: FAMILY MEDICINE CLINIC | Age: 54
End: 2019-05-01

## 2019-05-01 ENCOUNTER — OFFICE VISIT (OUTPATIENT)
Dept: FAMILY MEDICINE CLINIC | Age: 54
End: 2019-05-01
Payer: COMMERCIAL

## 2019-05-01 VITALS
HEART RATE: 87 BPM | TEMPERATURE: 98.7 F | BODY MASS INDEX: 42.66 KG/M2 | HEIGHT: 72 IN | OXYGEN SATURATION: 95 % | SYSTOLIC BLOOD PRESSURE: 138 MMHG | WEIGHT: 315 LBS | DIASTOLIC BLOOD PRESSURE: 73 MMHG

## 2019-05-01 DIAGNOSIS — L03.112 CELLULITIS OF LEFT AXILLA: Primary | ICD-10-CM

## 2019-05-01 DIAGNOSIS — M48.061 SPINAL STENOSIS OF LUMBAR REGION WITHOUT NEUROGENIC CLAUDICATION: ICD-10-CM

## 2019-05-01 DIAGNOSIS — I50.32 CHRONIC DIASTOLIC CONGESTIVE HEART FAILURE (HCC): ICD-10-CM

## 2019-05-01 DIAGNOSIS — N18.30 CKD (CHRONIC KIDNEY DISEASE) STAGE 3, GFR 30-59 ML/MIN (HCC): ICD-10-CM

## 2019-05-01 DIAGNOSIS — M54.42 CHRONIC MIDLINE LOW BACK PAIN WITH LEFT-SIDED SCIATICA: ICD-10-CM

## 2019-05-01 DIAGNOSIS — M96.1 POSTLAMINECTOMY SYNDROME OF LUMBAR REGION: ICD-10-CM

## 2019-05-01 DIAGNOSIS — M51.36 DDD (DEGENERATIVE DISC DISEASE), LUMBAR: ICD-10-CM

## 2019-05-01 DIAGNOSIS — G89.29 CHRONIC MIDLINE LOW BACK PAIN WITH LEFT-SIDED SCIATICA: ICD-10-CM

## 2019-05-01 DIAGNOSIS — G47.01 INSOMNIA DUE TO MEDICAL CONDITION: ICD-10-CM

## 2019-05-01 PROCEDURE — G8427 DOCREV CUR MEDS BY ELIG CLIN: HCPCS | Performed by: NURSE PRACTITIONER

## 2019-05-01 PROCEDURE — G8598 ASA/ANTIPLAT THER USED: HCPCS | Performed by: NURSE PRACTITIONER

## 2019-05-01 PROCEDURE — 99213 OFFICE O/P EST LOW 20 MIN: CPT | Performed by: NURSE PRACTITIONER

## 2019-05-01 PROCEDURE — G8417 CALC BMI ABV UP PARAM F/U: HCPCS | Performed by: NURSE PRACTITIONER

## 2019-05-01 PROCEDURE — 3017F COLORECTAL CA SCREEN DOC REV: CPT | Performed by: NURSE PRACTITIONER

## 2019-05-01 PROCEDURE — 1036F TOBACCO NON-USER: CPT | Performed by: NURSE PRACTITIONER

## 2019-05-01 RX ORDER — CEPHALEXIN 500 MG/1
500 CAPSULE ORAL 3 TIMES DAILY
Qty: 30 CAPSULE | Refills: 0 | Status: SHIPPED | OUTPATIENT
Start: 2019-05-01 | End: 2019-05-11

## 2019-05-01 RX ORDER — OXYCODONE HYDROCHLORIDE 10 MG/1
10 TABLET ORAL EVERY 8 HOURS PRN
Qty: 90 TABLET | Refills: 0 | Status: SHIPPED | OUTPATIENT
Start: 2019-05-01 | End: 2019-05-29 | Stop reason: SDUPTHER

## 2019-05-01 ASSESSMENT — ENCOUNTER SYMPTOMS
COLOR CHANGE: 1
COUGH: 0
WHEEZING: 0
SHORTNESS OF BREATH: 1

## 2019-05-01 NOTE — TELEPHONE ENCOUNTER
From: John Walsh.   To: Maciel Wolff MD  Sent: 5/1/2019 11:01 AM EDT  Subject: Non-Urgent Medical Question    Can you please Refill my pain meds oxycodone 10 Mg 1 Tablet every 8 hours I will be out of them Sunday thanks have a good day

## 2019-05-01 NOTE — TELEPHONE ENCOUNTER
Please Approve or Refuse.   Send to Pharmacy per Pt's Request:      Next Visit Date:  5/9/2019   Last Visit Date: 5/1/2019    Hemoglobin A1C (%)   Date Value   04/15/2019 8.2 (H)   01/18/2019 10.7   09/25/2018 9.2             ( goal A1C is < 7)   BP Readings from Last 3 Encounters:   05/01/19 138/73   04/26/19 105/60   04/15/19 128/64          (goal 120/80)  BUN   Date Value Ref Range Status   03/28/2019 41 (H) 6 - 20 mg/dL Final     CREATININE   Date Value Ref Range Status   03/28/2019 1.67 (H) 0.70 - 1.20 mg/dL Final     Potassium   Date Value Ref Range Status   03/28/2019 4.1 3.7 - 5.3 mmol/L Final

## 2019-05-01 NOTE — PROGRESS NOTES
Visit Information    Have you changed or started any medications since your last visit including any over-the-counter medicines, vitamins, or herbal medicines? no   Are you having any side effects from any of your medications? -  no  Have you stopped taking any of your medications? Is so, why? -  no    Have you seen any other physician or provider since your last visit? No  Have you had any other diagnostic tests since your last visit? No  Have you been seen in the emergency room and/or had an admission to a hospital since we last saw you? No  Have you had your routine dental cleaning in the past 6 months? no    Have you activated your [employer] account? If not, what are your barriers?  Yes     Patient Care Team:  Vic Rasmussen MD as PCP - General (Family Medicine)  Tj Parker MD as Consulting Physician (Otolaryngology)  Elie Rajput MD as Consulting Physician (Endocrinology)  Griselda Schmitt, DO as Consulting Physician (Cardiology)  Hayde Rajput DPM as Surgeon (Podiatry)  Berniece Primrose, MD as Consulting Physician (Ophthalmology)  [facility], MD as Consulting Physician (Nephrology)  Maribel Rush MD as Consulting Physician (Pulmonology)  Ailyn Jordan MD as Consulting Physician (Pulmonology)  Violette Perez MD as Surgeon (Vascular Surgery)  Mae Lopez MD as Consulting Physician (Gastroenterology)  Anayeli Mayfield [facility] as Pharmacist (Pharmacist)  Ailyn Jordan MD as Consulting Physician (Pulmonology)        Health Maintenance   Topic Date Due    Hepatitis B Vaccine (1 of 3 - Risk 3-dose series) 12/30/1983    Shingles Vaccine (1 of 2) 12/30/2014    Colon cancer screen colonoscopy  11/03/2017    Diabetic foot exam  04/19/2019    A1C test (Diabetic or Prediabetic)  07/15/2019    Lipid screen  09/20/2019    Diabetic retinal exam  10/24/2019    Potassium monitoring  03/28/2020    Creatinine monitoring  03/28/2020    DTaP/Tdap/Td vaccine (2 - Td) Diagnosis    DDD (degenerative disc disease), lumbar    Hypertriglyceridemia    CKD (chronic kidney disease) stage 3, GFR 30-59 ml/min ([facility]    CAD (coronary artery disease) s/p 1 stent    Mixed type COPD (chronic obstructive pulmonary disease) ([facility]    Type 2 diabetes mellitus with diabetic polyneuropathy, with long-term current use of insulin (HCC)    Chronic pancreatitis (HCC)    Displacement of lumbar intervertebral disc without myelopathy    Chronic diastolic congestive heart failure (HCC)    Insomnia    BPH (benign prostatic hyperplasia)    Class 3 obesity due to excess calories with serious comorbidity and body mass index (BMI) of 50.0 to 59.9 in adult    Essential hypertension    Hepatic steatosis    History of rib fracture    Umbilical hernia    Left inguinal hernia    Adrenal gland anomaly    Spinal stenosis of lumbar region without neurogenic claudication    Chronic back pain greater than 3 months duration    Gastroesophageal reflux disease without esophagitis    Hyperlipidemia with target LDL less than 70    Lower urinary tract symptoms (LUTS)    Vitamin D deficiency    Iron deficiency anemia due to chronic blood loss    BARBY treated with BiPAP    Chronic midline low back pain with left-sided sciatica    Stasis dermatitis of both legs    Anxiety    History of recurrent ear infection    Mixed conductive and sensorineural hearing loss of both ears    Tinnitus of both ears    Slow transit constipation    Chronic otitis externa of right ear    Bilateral leg edema    Tinea pedis of both feet    Onychomycosis    MDD (major depressive disorder), recurrent severe, without psychosis ([address]    Celiac artery stenosis ([address]    Myopia    Nuclear nonsenile cataract    Presbyopia    Postlaminectomy syndrome of lumbar region    Venous insufficiency of left lower extremity    COPD exacerbation (HCC)       Allergies   Allergen Reactions    Lorazepam      Falls      Nsaids CHF&CKD    Levofloxacin Nausea And Vomiting     duplicate     Levofloxacin Nausea And Vomiting         MEDICATIONS:     Current Outpatient Medications   Medication Sig Dispense Refill    cephALEXin (KEFLEX) 500 MG capsule Take 1 capsule by mouth 3 times daily for 10 days 30 capsule 0    Lidocaine 4 % LOTN Apply topically      varenicline (CHANTIX STARTING MONTH GUALBERTO) 0.5 MG X 11 & 1 MG X 42 tablet 0.5 mg po daily x 3 days, 0.5 mg BID x 4 days, then 1 mg BID thereafter . Call for refill 53 each 0    blood glucose test strips (ONE TOUCH ULTRA TEST) strip USE ONE STRIP TO TEST THREE TIMES A  strip 2    metolazone (ZAROXOLYN) 2.5 MG tablet Take 2.5 mg by mouth Twice a Week      PROAIR  (90 Base) MCG/ACT inhaler INHALE TWO PUFFS BY MOUTH EVERY 6 HOURS AS NEEDED FOR WHEEZING OR SHORTNESS OF BREATH 1 Inhaler 4    spironolactone (ALDACTONE) 50 MG tablet Take 1 tablet by mouth daily 90 tablet 3    vitamin D (CHOLECALCIFEROL) 1000 UNIT TABS tablet Take 1 tablet by mouth daily 90 tablet 3    docusate sodium (STOOL SOFTENER) 100 MG capsule Take 1 capsule by mouth 2 times daily 180 capsule 3    tiZANidine (ZANAFLEX) 4 MG tablet TAKE ONE TABLET BY MOUTH EVERY 8 HOURS AS NEEDED FOR BACK PAIN 90 tablet 5    pantoprazole (PROTONIX) 40 MG tablet Take 1 tablet by mouth nightly 90 tablet 3    miconazole (MICOTIN) 2 % powder Apply topically 2 times daily. 45 g 1    Ferrous Sulfate (IRON) 325 (65 Fe) MG TABS Take 1 tablet by mouth daily 90 tablet 3    metoprolol tartrate (LOPRESSOR) 50 MG tablet Take 1 tablet by mouth 2 times daily 180 tablet 3    lisinopril (PRINIVIL;ZESTRIL) 5 MG tablet Take 1 tablet by mouth daily . Discontinued Lisinopril  10 mg 90 tablet 3    clopidogrel (PLAVIX) 75 MG tablet Take 1 tablet by mouth daily 90 tablet 2    bumetanide (BUMEX) 1 MG tablet Take 3 tablets by mouth 2 times daily 180 tablet 1    albuterol (PROVENTIL) (2.5 MG/3ML) 0.083% nebulizer solution Take 3 mLs by nebulization every 6 hours as needed for Wheezing or Shortness of Breath 120 vial 0    pravastatin (PRAVACHOL) 40 MG tablet Take 1 tablet by mouth nightly 90 tablet 0    fluticasone-salmeterol (ADVAIR HFA) 230-21 MCG/ACT inhaler Inhale 2 puffs into the lungs 2 times daily 12 g 5    insulin regular human (HUMULIN R) 500 UNIT/ML concentrated injection vial Inject into the skin 3 times daily Indications: 50 in the morning 50 at lunch and 45 at dinner. Inject 250 units with breakfast and lunch, and inject 175 units with dinner.  nitroGLYCERIN (NITROSTAT) 0.4 MG SL tablet Place 1 tablet under the tongue every 5 minutes as needed for Chest pain 25 tablet 3    fluticasone (CUTIVATE) 0.05 % cream Apply topically 2 times daily       fluticasone (FLONASE) 50 MCG/ACT nasal spray 2 sprays by Nasal route daily 1 Bottle 3    Melatonin 10 MG TABS Take 10 mg by mouth nightly as needed (insomnia) 90 tablet 1    COMFORT ASSIST INSULIN SYRINGE 31G X 5/16\" 1 ML MISC       aspirin 81 MG EC tablet Take 81 mg by mouth daily.  oxyCODONE HCl (OXY-IR) 10 MG immediate release tablet Take 1 tablet by mouth every 8 hours as needed for Pain for up to 30 days. 90 tablet 0    Spacer/Aero Chamber Mouthpiece MISC 1 each by Does not apply route once as needed (to be used with his inhalers) 1 each 0    SPIRIVA HANDIHALER 18 MCG inhalation capsule INHALE THE ENTIRE CONTENTS OF 1 CAPSULE ONCE A DAY USING HANDIHALER DEVICE 60 capsule 10    venlafaxine (EFFEXOR) 100 MG tablet Take 1 tablet by mouth 2 times daily 60 tablet 3    Handicap Placard MISC by Does not apply route Can't walk greater than 200 feet. Expires in 5 years. 1 each 0     No current facility-administered medications for this visit. SOCIAL HISTORY    Reviewed and updated. Miguel  reports that he quit smoking 7 days ago. His smoking use included cigarettes. He started smoking about 33 years ago. He has a 66.00 pack-year smoking history.  He quit smokeless tobacco Associated Orders     Cellulitis of left axilla    -  Primary    Recurrent cellulitis, in setting of DM. Keep area clean and dry, add Keflex. Patient to call/go to ER if fever, chills, or worsening sx. cephALEXin (KEFLEX) 500 MG capsule [9500]                  FOLLOW UP AND INSTRUCTIONS:     Return in about 1 week (around 5/8/2019) for cellulitis, or sooner if needed. · Miguel received counseling on the following healthy behaviors: medication adherence and keeping area clean, dry. · Discussed use, benefit, and side effects of prescribed medications. Barriers to medication compliance addressed. All patient questions answered. Pt voiced understanding. Shaun Burkitt, APRN - CNP    5/1/2019, 1:36 PM    Please note that this chart was generated using voice recognition Dragon dictation software. Although every effort was made to ensure the accuracy of this automatedtranscription, some errors in transcription may have occurred.

## 2019-05-02 ENCOUNTER — HOSPITAL ENCOUNTER (OUTPATIENT)
Dept: OTHER | Age: 54
Discharge: HOME | End: 2019-05-02
Payer: COMMERCIAL

## 2019-05-02 ENCOUNTER — HOSPITAL ENCOUNTER (OUTPATIENT)
Dept: PHARMACY | Age: 54
Setting detail: THERAPIES SERIES
Discharge: HOME | End: 2019-05-02
Payer: COMMERCIAL

## 2019-05-02 VITALS
OXYGEN SATURATION: 97 % | RESPIRATION RATE: 16 BRPM | WEIGHT: 315 LBS | BODY MASS INDEX: 54.9 KG/M2 | DIASTOLIC BLOOD PRESSURE: 60 MMHG | SYSTOLIC BLOOD PRESSURE: 136 MMHG | HEART RATE: 102 BPM

## 2019-05-02 LAB
ANION GAP SERPL CALCULATED.3IONS-SCNC: 14 MMOL/L (ref 9–17)
BUN BLDV-MCNC: 37 MG/DL (ref 6–20)
CHLORIDE BLD-SCNC: 100 MMOL/L (ref 98–107)
CO2: 25 MMOL/L (ref 20–31)
CREAT SERPL-MCNC: 1.63 MG/DL (ref 0.7–1.2)
GFR AFRICAN AMERICAN: 54 ML/MIN
GFR NON-AFRICAN AMERICAN: 44 ML/MIN
GFR SERPL CREATININE-BSD FRML MDRD: ABNORMAL ML/MIN/{1.73_M2}
GFR SERPL CREATININE-BSD FRML MDRD: ABNORMAL ML/MIN/{1.73_M2}
HBA1C MFR BLD: 7.2 %
POTASSIUM SERPL-SCNC: 3.9 MMOL/L (ref 3.7–5.3)
SODIUM BLD-SCNC: 139 MMOL/L (ref 135–144)

## 2019-05-02 PROCEDURE — 84520 ASSAY OF UREA NITROGEN: CPT

## 2019-05-02 PROCEDURE — 99211 OFF/OP EST MAY X REQ PHY/QHP: CPT

## 2019-05-02 PROCEDURE — 99213 OFFICE O/P EST LOW 20 MIN: CPT

## 2019-05-02 PROCEDURE — 36415 COLL VENOUS BLD VENIPUNCTURE: CPT

## 2019-05-02 PROCEDURE — 82565 ASSAY OF CREATININE: CPT

## 2019-05-02 PROCEDURE — 80051 ELECTROLYTE PANEL: CPT

## 2019-05-02 RX ORDER — VARENICLINE TARTRATE 1 MG/1
1 TABLET, FILM COATED ORAL 2 TIMES DAILY
Qty: 60 TABLET | Refills: 1 | Status: SHIPPED | OUTPATIENT
Start: 2019-05-02 | End: 2019-07-31

## 2019-05-02 NOTE — PROGRESS NOTES
Pt now using spacer per pulmonary and much improvement noted. Weight down 14 lbs and blood sugars are running 150 lakisha. LOOKS SO MUCH IMPROVED!!!! Was able to walk in to appointment without stopping. Pt states [place] even exercised [place] and did some stationary biking. Labs drawn.

## 2019-05-02 NOTE — PROGRESS NOTES
Diabetic Medication Management   [address] [facility] [address]    [address] [facility]  Phone: [phone]  Fax: [phone]    NAME: Nishi Kelley MEDICAL RECORD NUMBER:  [id number]  AGE: 46 y.o. GENDER: male  : 1965  EPISODE DATE:  2019       Mr. Daja Phoenix was referred to [address] [place] [address] Medication Management Services by Dr. Jada Piper, Special instructions include: titrate all medications (as defined in clinic's policy and procedure)    Goals per referral:   Fasting blood glucose: < 130  Peak postprandial glucose: < 180  A1C: < 7    Other goals:  Blood pressure goal: 130/80  Weight loss goal (~10%): Target weight  380 to be reached by date: 2019 (3-6 months)  Physical Activity goal:    10 minutes three times per week to be reached by date: 2019 (3-6 months)  Smoking Cessation   Quit Date: Patient stopped smoking on 19 with Chantix  Cholesterol at target:   Date: Yes LDL 71 No HDL 28 Trig 312 (18)  Annual eye exam:    Date: Yes - every 3 months  Comprehensive annual foot exam:   Date: Yes - every 2 months for regular foot care (Dr. Meg Ivory)  Annual urine Albumin and serum creatinine:   Date: Yes 15 mg/L, SrCr 1.74 mg/dL        Subjective   Mr. Daja Phoenix is a 46 y.o. male here for the Diabetes Service for self-management education, medication review including over the counter medications and herbal products, overall wellbeing assessment, transition of care and any needed adjustments with updates and recommendations communicated to the referring physician. Patient's name and  verified. Patient Findings:  Patient significantly less SOB today. Has been using his spacer along with his inhalers and was able to walk into his appt today without difficulty for the first time since he can remember. Was able to start exercising recently which is first time in about 2 years.   Able to get on his stationary bike for about 5 minutes 1-2 time per day most days of week recently. Patient did have appt with pulmonologist (Dr Sandra Maldonado) with no medication changes but to continue using spacer with inhalers. Has been taking his water pills every day. Has also been eating much better with significantly less in quantity. Is using a smaller plate. Has lost 14 lbs since last appt which patient feels is both due to weight loss and water loss. Has been eating a lot more vegetables also. Patient does has a skin infection under left arm which he is on cephalexin for. Discussed importance of finishing all 10 days of antibiotics. Patient started Chantix and states it has been 14 days since having a cigarette. Patient motivated to continue avoiding smoking. States he is saving approximately $30 per day. Discussed continuing Chantix for full 12 weeks to help him continue abstinence from smoking. []  Missed doses   []  Emergency Room Visit    []  Medication changes  []  Hospitalization   [x]  Diet changes   []  Acute illness   [x]  Activity changes      Symptoms of hypoglycemia - has had a few lower BS occurring with morning blood sugars. In last week has had a 73 and a 71 and a total of 5 episodes of 70's with on 60's in last two weeks. Patient states he has not been snacking at night but when blood sugar low he wakes up and eats something. Patient does carry glucose tabs with him and understands how to use.     []  None    [x]  Shakiness    []  Lightheadedness or dizziness   []  Confusion      []  Sweating   []  Other       Symptoms of hyperglycemia   [x]  None   []  Frequent urination    []  Increased thirst   []  Other    Medication adverse reactions   [x]  None   []  Diarrhea / Nausea / Vomiting / Constipation / flatulence   []  Hypertension   []  Peripheral edema     []  Signs of infection   []  Headache   []  Vision changes   []  Increased cholesterol    []  Weight gain   []  Change in renal function   []  Increased potassium  []  Other      Comments:  Patient takes Humulin R U500. Patient has been taking Humulin R U500 0.5ml with breakfast and lunch and 0.45 with dinner. Patient in much better spirits today and states he feels hopeful which he has not in a very long time. If recent hospital admission / ED visit, was this related to Diabetes No: COPD .  Discharge date 3/28/19    Objective     PMHx:    Past Medical History:   Diagnosis Date    Acute bronchitis with chronic obstructive pulmonary disease (COPD) ([address]     Acute on chronic kidney failure ([address] 7/20/2017    Acute on chronic respiratory failure (HCC) 10/2/2018    Adhesive capsulitis of left shoulder 3/25/2017    Anxiety 10/2/2016    Arthropathy, unspecified, other specified sites 6/13/2013    Asthma     Bilateral lower leg cellulitis 12/24/2016    Bilateral lower leg cellulitis 7/5/2017    Bilateral lower leg cellulitis 2/17/2016    Blood in stool     CAD (coronary artery disease)     Cellulitis of both lower extremities 5/25/2017    Cellulitis of leg, left 7/20/2017    CHF (congestive heart failure), NYHA class III ([address] 8/14/2013    Chronic back pain     Chronic headaches     was referred to neuro, testing scheduled    Chronic kidney disease     Chronic ulcer of left leg, with fat layer exposed ([address] 2/22/2019    COPD (chronic obstructive pulmonary disease) ([address]     COPD exacerbation ([address] 11/2/2016    Diabetic neuropathy ([address] 8/14/2013    Displacement of lumbar intervertebral disc without myelopathy 6/13/2013    Ear infection     RIGHT    Facial cellulitis 2012    Fall 3/25/2017    GERD (gastroesophageal reflux disease)     Head injury     Hearing loss in right ear     pencil pierced ear as a child    Hepatic steatosis 12/3/2015    History of general anesthesia complication     has woke up during surgery under anesthesia    History of rib fracture 12/3/2015    Chronic     Hyperlipidemia     Hypertension     Insomnia     Intolerance of continuous positive airway pressure (CPAP) Alcohol/week: 0.0 oz       Pertinent Labs:    Lab Results   Component Value Date    LABA1C 8.2 (H) 04/15/2019    LABA1C 10.7 01/18/2019    LABA1C 9.2 09/25/2018     Lab Results   Component Value Date    CHOL 161 09/20/2018    TRIG 312 (H) 09/20/2018    HDL 28 (L) 09/20/2018     Lab Results   Component Value Date    CREATININE 1.63 (H) 05/02/2019    BUN 37 (H) 05/02/2019     05/02/2019    K 3.9 05/02/2019     05/02/2019    CO2 25 05/02/2019     Lab Results   Component Value Date    ALT 18 12/11/2018       Weight:  Wt Readings from Last 3 Encounters:   05/02/19 (!) 404 lb 12.8 oz (183.6 kg)   05/01/19 (!) 403 lb (182.8 kg)   04/26/19 (!) 404 lb (183.3 kg)       Current medications:  Prior to Admission medications    Medication Sig Start Date End Date Taking? Authorizing Provider   cephALEXin (KEFLEX) 500 MG capsule Take 1 capsule by mouth 3 times daily for 10 days 5/1/19 5/11/19 Yes Fernandez Burn, APRN - CNP   oxyCODONE HCl (OXY-IR) 10 MG immediate release tablet Take 1 tablet by mouth every 8 hours as needed for Pain for up to 30 days. 5/1/19 5/31/19 Yes Shneg Rae MD   Lidocaine 4 % LOTN Apply topically   Yes Historical Provider, MD   varenicline (CHANTIX STARTING MONTH PAK) 0.5 MG X 11 & 1 MG X 42 tablet 0.5 mg po daily x 3 days, 0.5 mg BID x 4 days, then 1 mg BID thereafter . Call for refill 4/15/19  Yes Sheng Rae MD   blood glucose test strips (ONE TOUCH ULTRA TEST) strip USE ONE STRIP TO TEST THREE TIMES A DAY 4/4/19  Yes Sheng Rae MD   metolazone (ZAROXOLYN) 2.5 MG tablet Take 2.5 mg by mouth Twice a Week   Yes Historical Provider, MD Lyubov Mason 18 MCG inhalation capsule INHALE THE ENTIRE CONTENTS OF 1 CAPSULE ONCE A DAY USING HANDIHALER DEVICE 3/27/19  Yes June Ya MD   PROAIR  (40 Base) MCG/ACT inhaler INHALE TWO PUFFS BY MOUTH EVERY 6 HOURS AS NEEDED FOR WHEEZING OR SHORTNESS OF BREATH 3/27/19  Yes June Ya MD   spironolactone (ALDACTONE) (HUMULIN R) 500 UNIT/ML concentrated injection vial Inject into the skin 3 times daily Indications: 50 in the morning 50 at lunch and 40 at dinner. Inject 250 units with breakfast and lunch, and inject 175 units with dinner. Yes Historical Provider, MD   nitroGLYCERIN (NITROSTAT) 0.4 MG SL tablet Place 1 tablet under the tongue every 5 minutes as needed for Chest pain 18  Yes Devin Marin MD   fluticasone (CUTIVATE) 0.05 % cream Apply topically 2 times daily  17  Yes Historical Provider, MD   fluticasone (FLONASE) 50 MCG/ACT nasal spray 2 sprays by Nasal route daily 17  Yes Devin Marin MD   Melatonin 10 MG TABS Take 10 mg by mouth nightly as needed (insomnia) 16  Yes Devin Marin MD   COMFORT ASSIST INSULIN SYRINGE 31G X \" 1 ML [address] [place] [address]  16  Yes Historical Provider, MD   aspirin 81 MG EC tablet Take 81 mg by mouth daily. Yes Historical Provider, MD   Spacer/Aero Chamber Mouthpiece [address] [place] [address] 1 each by Does not apply route once as needed (to be used with his inhalers) 4/15/19 4/15/19  Devin Marin MD       Immunizations:   Most Recent Immunizations   Administered Date(s) Administered    Influenza Virus Vaccine 10/12/2015    Influenza, Marito Nolasco, 3 yrs and older, IM, PF (Fluzone 3 yrs and older or Afluria 5 yrs and older) 2018    Pneumococcal Polysaccharide (Inuwyleqt10) 2013    Tdap (Boostrix, Adacel) 2018       Home Blood Glucose Results:   Patient has blood glucose log with him today. Typical breakfast between 6:30/7am running  since being on current insulin dose. Lunch between 12-3pm running . Dinner at [employer] running . Patient to continue monitoring blood sugar and noting level as well as how much insulin he takes each day.      Blood glucose trends noted: see above    Assessment     A1c at goal: No but improvin.2 (4/15/19)  Blood Pressure at goal: No: 136/60 today  Weight at goal: No: 404lbs  Physical activity: 10 minutes most days of the week  Physical activity at goal: Yes: Patient has been able to start exercising with short periods (5 min) 1-2 times per day most days of the week on his stationary bike. Will gradually increase this as able. Smoking Cessation: Yes: Currently taking Chantix and has not had a cigarette in 2 weeks. Motivated to remain abstinent. Patient counseled to continue Chantix for full 12 weeks of treatment. New Rx sent over for continuing treatment for patient. Cholesterol at target: No: LDL 71, HDL28, TRIG 312 (9/20/18)  Annual eye exam completed: Yes  Comprehensive Foot Exam Completed: Yes  Annual urine albumin and serum creatinine: Yes    Statin: Yes    Appropriate?: Yes  Changes made:     ACE/ARB: Yes  Appropriate?: Yes  Changes made:     Aspirin: Yes  Appropriate?: Yes  Changes made:     Eating patterns:    [x]  My plate    []  Mediterranean diet   []  Low sodium   []  DASH diet   [x]  Portion control   [x]  Reduced calorie    []  Fast food / Restaurant  []  High glycemic index foods   []  Sugary beverages   []  Other     Comment: Patient states he is eating significantly less and using smaller plates. Feels satisfied with smaller quantities. Eating more veges and not snacking at night unless his blood sugar is low. Current Medications Affecting Diabetes:  Humulin R 500    Compliant: no  Barriers to medication therapy: none    Medications attempted in the past:  Metformin - cardiologist took him off but patient unsure why  Januvia - PCP took him off but patient unsure why    Recent exacerbations / new problems:  Continued elevated but improving A1C    Last office dictation reviewed: yes        type 2 DM under improving control as evidenced by A1C 8.2 on 4/15/19    Plan         Counseling at today's visit: Continued monitoring of blood glucose with documentation on log, continued Chantix and Cephalexin. Continued exercise with increase as comfortable. .  Decreased dose of insulin: to 0.4ml with dinner. Continue 0.5ml with breakfast and lunch. .   Patient to call if am blood sugars start going 130 or more.     Physician Follow-up:  Every 3 months    Medication Management Follow-up:   Diabetes Service   3 weeks    Electronically signed by Juan A Juarez RPH on 5/2/2019 at 12:46 PM

## 2019-05-09 ENCOUNTER — OFFICE VISIT (OUTPATIENT)
Dept: FAMILY MEDICINE CLINIC | Age: 54
End: 2019-05-09
Payer: COMMERCIAL

## 2019-05-09 ENCOUNTER — HOSPITAL ENCOUNTER (OUTPATIENT)
Age: 54
Setting detail: SPECIMEN
Discharge: HOME | End: 2019-05-09
Payer: COMMERCIAL

## 2019-05-09 VITALS
DIASTOLIC BLOOD PRESSURE: 60 MMHG | HEIGHT: 72 IN | OXYGEN SATURATION: 96 % | HEART RATE: 78 BPM | WEIGHT: 315 LBS | SYSTOLIC BLOOD PRESSURE: 118 MMHG | BODY MASS INDEX: 42.66 KG/M2

## 2019-05-09 DIAGNOSIS — I10 ESSENTIAL HYPERTENSION: ICD-10-CM

## 2019-05-09 DIAGNOSIS — L03.112 CELLULITIS OF AXILLA, LEFT: Primary | ICD-10-CM

## 2019-05-09 DIAGNOSIS — L03.112 CELLULITIS OF AXILLA, LEFT: ICD-10-CM

## 2019-05-09 DIAGNOSIS — Z79.4 TYPE 2 DIABETES MELLITUS WITH DIABETIC POLYNEUROPATHY, WITH LONG-TERM CURRENT USE OF INSULIN (HCC): ICD-10-CM

## 2019-05-09 DIAGNOSIS — E11.42 TYPE 2 DIABETES MELLITUS WITH DIABETIC POLYNEUROPATHY, WITH LONG-TERM CURRENT USE OF INSULIN (HCC): ICD-10-CM

## 2019-05-09 DIAGNOSIS — L30.4 INTERTRIGO: ICD-10-CM

## 2019-05-09 DIAGNOSIS — I50.32 CHRONIC DIASTOLIC CONGESTIVE HEART FAILURE (HCC): ICD-10-CM

## 2019-05-09 DIAGNOSIS — Z11.59 ENCOUNTER FOR SCREENING FOR OTHER VIRAL DISEASES: ICD-10-CM

## 2019-05-09 LAB
ABSOLUTE EOS #: 0.1 K/UL (ref 0–0.4)
ABSOLUTE IMMATURE GRANULOCYTE: ABNORMAL K/UL (ref 0–0.3)
ABSOLUTE LYMPH #: 1.1 K/UL (ref 1–4.8)
ABSOLUTE MONO #: 0.6 K/UL (ref 0.1–1.3)
ALBUMIN SERPL-MCNC: 4 G/DL (ref 3.5–5.2)
ALBUMIN/GLOBULIN RATIO: ABNORMAL (ref 1–2.5)
ALP BLD-CCNC: 61 U/L (ref 40–129)
ALT SERPL-CCNC: 25 U/L (ref 5–41)
ANION GAP SERPL CALCULATED.3IONS-SCNC: 18 MMOL/L (ref 9–17)
AST SERPL-CCNC: 19 U/L
BASOPHILS # BLD: 1 % (ref 0–2)
BASOPHILS ABSOLUTE: 0.1 K/UL (ref 0–0.2)
BILIRUB SERPL-MCNC: 0.35 MG/DL (ref 0.3–1.2)
BNP INTERPRETATION: NORMAL
BUN BLDV-MCNC: 25 MG/DL (ref 6–20)
BUN/CREAT BLD: ABNORMAL (ref 9–20)
C-REACTIVE PROTEIN: 18.7 MG/L (ref 0–5)
CALCIUM SERPL-MCNC: 8.9 MG/DL (ref 8.6–10.4)
CHLORIDE BLD-SCNC: 101 MMOL/L (ref 98–107)
CO2: 19 MMOL/L (ref 20–31)
CREAT SERPL-MCNC: 1.4 MG/DL (ref 0.7–1.2)
DIFFERENTIAL TYPE: ABNORMAL
EOSINOPHILS RELATIVE PERCENT: 1 % (ref 0–4)
GFR AFRICAN AMERICAN: >60 ML/MIN
GFR NON-AFRICAN AMERICAN: 53 ML/MIN
GFR SERPL CREATININE-BSD FRML MDRD: ABNORMAL ML/MIN/{1.73_M2}
GFR SERPL CREATININE-BSD FRML MDRD: ABNORMAL ML/MIN/{1.73_M2}
GLUCOSE BLD-MCNC: 156 MG/DL (ref 70–99)
HBV SURFACE AB TITR SER: <3.5 MIU/ML
HCT VFR BLD CALC: 35.4 % (ref 41–53)
HEMOGLOBIN: 11.4 G/DL (ref 13.5–17.5)
IMMATURE GRANULOCYTES: ABNORMAL %
LYMPHOCYTES # BLD: 11 % (ref 24–44)
MCH RBC QN AUTO: 28.4 PG (ref 26–34)
MCHC RBC AUTO-ENTMCNC: 32.1 G/DL (ref 31–37)
MCV RBC AUTO: 88.5 FL (ref 80–100)
MONOCYTES # BLD: 7 % (ref 1–7)
NRBC AUTOMATED: ABNORMAL PER 100 WBC
PDW BLD-RTO: 16.7 % (ref 11.5–14.9)
PLATELET # BLD: 250 K/UL (ref 150–450)
PLATELET ESTIMATE: ABNORMAL
PMV BLD AUTO: 8.4 FL (ref 6–12)
POTASSIUM SERPL-SCNC: 4.5 MMOL/L (ref 3.7–5.3)
PRO-BNP: 208 PG/ML
RBC # BLD: 4 M/UL (ref 4.5–5.9)
RBC # BLD: ABNORMAL 10*6/UL
SEDIMENTATION RATE, ERYTHROCYTE: 42 MM (ref 0–15)
SEG NEUTROPHILS: 80 % (ref 36–66)
SEGMENTED NEUTROPHILS ABSOLUTE COUNT: 8 K/UL (ref 1.3–9.1)
SODIUM BLD-SCNC: 138 MMOL/L (ref 135–144)
TOTAL PROTEIN: 7.2 G/DL (ref 6.4–8.3)
WBC # BLD: 9.9 K/UL (ref 3.5–11)
WBC # BLD: ABNORMAL 10*3/UL

## 2019-05-09 PROCEDURE — 86317 IMMUNOASSAY INFECTIOUS AGENT: CPT

## 2019-05-09 PROCEDURE — 86140 C-REACTIVE PROTEIN: CPT

## 2019-05-09 PROCEDURE — 1036F TOBACCO NON-USER: CPT | Performed by: FAMILY MEDICINE

## 2019-05-09 PROCEDURE — 2022F DILAT RTA XM EVC RTNOPTHY: CPT | Performed by: FAMILY MEDICINE

## 2019-05-09 PROCEDURE — 85651 RBC SED RATE NONAUTOMATED: CPT

## 2019-05-09 PROCEDURE — 80053 COMPREHEN METABOLIC PANEL: CPT

## 2019-05-09 PROCEDURE — 36415 COLL VENOUS BLD VENIPUNCTURE: CPT

## 2019-05-09 PROCEDURE — 3045F PR MOST RECENT HEMOGLOBIN A1C LEVEL 7.0-9.0%: CPT | Performed by: FAMILY MEDICINE

## 2019-05-09 PROCEDURE — 83880 ASSAY OF NATRIURETIC PEPTIDE: CPT

## 2019-05-09 PROCEDURE — G8417 CALC BMI ABV UP PARAM F/U: HCPCS | Performed by: FAMILY MEDICINE

## 2019-05-09 PROCEDURE — 85025 COMPLETE CBC W/AUTO DIFF WBC: CPT

## 2019-05-09 PROCEDURE — G8598 ASA/ANTIPLAT THER USED: HCPCS | Performed by: FAMILY MEDICINE

## 2019-05-09 PROCEDURE — G8427 DOCREV CUR MEDS BY ELIG CLIN: HCPCS | Performed by: FAMILY MEDICINE

## 2019-05-09 PROCEDURE — 3017F COLORECTAL CA SCREEN DOC REV: CPT | Performed by: FAMILY MEDICINE

## 2019-05-09 PROCEDURE — 99214 OFFICE O/P EST MOD 30 MIN: CPT | Performed by: FAMILY MEDICINE

## 2019-05-09 RX ORDER — NYSTATIN 100000 [USP'U]/G
POWDER TOPICAL
Qty: 60 G | Refills: 3 | Status: SHIPPED | OUTPATIENT
Start: 2019-05-09 | End: 2019-06-19 | Stop reason: SDUPTHER

## 2019-05-09 RX ORDER — CLINDAMYCIN HYDROCHLORIDE 300 MG/1
300 CAPSULE ORAL 4 TIMES DAILY
Qty: 40 CAPSULE | Refills: 0 | Status: SHIPPED | OUTPATIENT
Start: 2019-05-09 | End: 2019-06-11 | Stop reason: SDUPTHER

## 2019-05-09 RX ORDER — GREEN TEA/HOODIA GORDONII 315-12.5MG
1 CAPSULE ORAL 2 TIMES DAILY
Qty: 60 TABLET | Refills: 0 | Status: SHIPPED | OUTPATIENT
Start: 2019-05-09 | End: 2019-06-11 | Stop reason: SDUPTHER

## 2019-05-09 ASSESSMENT — ENCOUNTER SYMPTOMS
SHORTNESS OF BREATH: 1
DIARRHEA: 0
CONSTIPATION: 0
ABDOMINAL DISTENTION: 0
APNEA: 1
NAUSEA: 0
COUGH: 1
ABDOMINAL PAIN: 0
WHEEZING: 0
BACK PAIN: 1
VOMITING: 0
CHEST TIGHTNESS: 0

## 2019-05-09 NOTE — PROGRESS NOTES
Visit Information    Have you changed or started any medications since your last visit including any over-the-counter medicines, vitamins, or herbal medicines? no   Are you having any side effects from any of your medications? -  no  Have you stopped taking any of your medications? Is so, why? -  no    Have you seen any other physician or provider since your last visit? Yes - Records Obtained  Have you had any other diagnostic tests since your last visit? Yes - Records Obtained  Have you been seen in the emergency room and/or had an admission to a hospital since we last saw you? Yes - Records Obtained  Have you had your routine dental cleaning in the past 6 months? no    Have you activated your [employer] account? If not, what are your barriers?  Yes     Patient Care Team:  Joceline Rosen MD as PCP - General (Family Medicine)  Gabino Sommer MD as Consulting Physician (Otolaryngology)  Emma Grider MD as Consulting Physician (Endocrinology)  Mari Pineda DO as Consulting Physician (Cardiology)  Ambrose Ruiz DPM as Surgeon (Podiatry)  Miguel A Young MD as Consulting Physician (Ophthalmology)  Tess Nassar MD as Consulting Physician (Nephrology)  Indira Adan MD as Consulting Physician (Pulmonology)  Naveen Gamez MD as Consulting Physician (Pulmonology)  Mark Sanchez MD as Surgeon (Vascular Surgery)  Anthony Briceno MD as Consulting Physician (Gastroenterology)  Chantale Fernandez, [address] [place] [address] as Pharmacist (Pharmacist)  Naveen Gamez MD as Consulting Physician (Pulmonology)    Medical History Review  Past Medical, Family, and Social History reviewed and does contribute to the patient presenting condition    Health Maintenance   Topic Date Due    Hepatitis B Vaccine (1 of 3 - Risk 3-dose series) 12/30/1983    Shingles Vaccine (1 of 2) 12/30/2014    Colon cancer screen colonoscopy  11/03/2017    Diabetic foot exam  04/19/2019    A1C test (Diabetic or Prediabetic)

## 2019-05-09 NOTE — PROGRESS NOTES
Chief Complaint   Patient presents with    Cellulitis     pt doesn't think that it is cellulitis and feels it is possibly yeast or something else    Congestive Heart Failure         Patient presents today for an acute visit secondary to Cellulitis (pt doesn't think that it is cellulitis and feels it is possibly yeast or something else) and Congestive Heart Failure   . HPI      Comes with his wife    Patient was seen on 5/1/19 by one of my partners for left axilla cellulitis, and he was given Keflex 500 MG 3 times a day. He reports Left axilla rash is not better since given Keflex, and it is spreading to the left upper chest.   Denies fever, chills or night sweats. The area is painful and it feels hard. Took Keflex but doesn't feel it helped. He does have rash in the right armpit too and has been sweating a lot. This rash in the right armpit doesn't hurt. He did have intertrigo before . Has been more short of breath. Has known CHF, HTN, and COPD. He finally quit smoking with Chantix. He denies chest pain palpitations or worsening leg edema  Denies significant cough. He has oxygen at home, but has been using it only as needed. He reports compliance with BIPAP at nighttime. The pulmonologist wants him to use the oxygen through the BiPAP, but he is in the process of setting it up.   I advised him to increase oxygen use throughout the day every day until his shortness of breath is better      Lab Results   Component Value Date    LVEF 53 03/29/2018    LVEFMODE Echo 08/29/2017     Pulse ox normal low while on 2 L/min oxygen given by us in the office    SpO2 Readings from Last 6 Encounters:   05/09/19 96%   05/02/19 97%   05/01/19 95%   04/26/19 96%   04/15/19 99%   03/28/19 95%     Has known diabetes mellitus type 2 is better controlled and he follows with Dr. Elly Frances Blood Glucose 88 and has been controlled at home  A1c is improving     Lab Results   Component Value Date    LABA1C 8.2 (H) 04/15/2019    LABA1C 10.7 01/18/2019    LABA1C 9.2 09/25/2018     There is unintentional weight gain since the last visit with me of 8 pounds in one month and a half    Wt Readings from Last 3 Encounters:   05/09/19 (!) 411 lb 9.6 oz (186.7 kg)   05/02/19 (!) 404 lb 12.8 oz (183.6 kg)   05/01/19 (!) 403 lb (182.8 kg)       03/15/19 (!) 403 lb 3.2 oz (182.9 kg)             Allergies   Allergen Reactions    Lorazepam      Falls      Nsaids      CHF&CKD    Levofloxacin Nausea And Vomiting         Current Outpatient Medications   Medication Sig Dispense Refill    varenicline (CHANTIX) 1 MG tablet Take 1 tablet by mouth 2 times daily 60 tablet 1    cephALEXin (KEFLEX) 500 MG capsule Take 1 capsule by mouth 3 times daily for 10 days 30 capsule 0    oxyCODONE HCl (OXY-IR) 10 MG immediate release tablet Take 1 tablet by mouth every 8 hours as needed for Pain for up to 30 days. 90 tablet 0    Lidocaine 4 % LOTN Apply topically      blood glucose test strips (ONE TOUCH ULTRA TEST) strip USE ONE STRIP TO TEST THREE TIMES A  strip 2    metolazone (ZAROXOLYN) 2.5 MG tablet Take 2.5 mg by mouth Twice a Week      SPIRIVA HANDIHALER 18 MCG inhalation capsule INHALE THE ENTIRE CONTENTS OF 1 CAPSULE ONCE A DAY USING HANDIHALER DEVICE 60 capsule 10    PROAIR  (90 Base) MCG/ACT inhaler INHALE TWO PUFFS BY MOUTH EVERY 6 HOURS AS NEEDED FOR WHEEZING OR SHORTNESS OF BREATH 1 Inhaler 4    spironolactone (ALDACTONE) 50 MG tablet Take 1 tablet by mouth daily 90 tablet 3    vitamin D (CHOLECALCIFEROL) 1000 UNIT TABS tablet Take 1 tablet by mouth daily 90 tablet 3    docusate sodium (STOOL SOFTENER) 100 MG capsule Take 1 capsule by mouth 2 times daily 180 capsule 3    venlafaxine (EFFEXOR) 100 MG tablet Take 1 tablet by mouth 2 times daily 60 tablet 3    Handicap Placard MISC by Does not apply route Can't walk greater than 200 feet. Expires in 5 years.  1 each 0    tiZANidine (ZANAFLEX) 4 MG tablet TAKE ONE TABLET BY MOUTH EVERY 8 HOURS AS NEEDED FOR BACK PAIN 90 tablet 5    pantoprazole (PROTONIX) 40 MG tablet Take 1 tablet by mouth nightly 90 tablet 3    miconazole (MICOTIN) 2 % powder Apply topically 2 times daily. 45 g 1    Ferrous Sulfate (IRON) 325 (65 Fe) MG TABS Take 1 tablet by mouth daily 90 tablet 3    metoprolol tartrate (LOPRESSOR) 50 MG tablet Take 1 tablet by mouth 2 times daily 180 tablet 3    lisinopril (PRINIVIL;ZESTRIL) 5 MG tablet Take 1 tablet by mouth daily . Discontinued Lisinopril  10 mg 90 tablet 3    clopidogrel (PLAVIX) 75 MG tablet Take 1 tablet by mouth daily 90 tablet 2    bumetanide (BUMEX) 1 MG tablet Take 3 tablets by mouth 2 times daily 180 tablet 1    albuterol (PROVENTIL) (2.5 MG/3ML) 0.083% nebulizer solution Take 3 mLs by nebulization every 6 hours as needed for Wheezing or Shortness of Breath 120 vial 0    pravastatin (PRAVACHOL) 40 MG tablet Take 1 tablet by mouth nightly 90 tablet 0    fluticasone-salmeterol (ADVAIR HFA) 230-21 MCG/ACT inhaler Inhale 2 puffs into the lungs 2 times daily 12 g 5    insulin regular human (HUMULIN R) 500 UNIT/ML concentrated injection vial Inject into the skin 3 times daily Indications: 50 in the morning 50 at lunch and 40 at dinner. Inject 250 units with breakfast and lunch, and inject 175 units with dinner.  nitroGLYCERIN (NITROSTAT) 0.4 MG SL tablet Place 1 tablet under the tongue every 5 minutes as needed for Chest pain 25 tablet 3    fluticasone (CUTIVATE) 0.05 % cream Apply topically 2 times daily       fluticasone (FLONASE) 50 MCG/ACT nasal spray 2 sprays by Nasal route daily 1 Bottle 3    Melatonin 10 MG TABS Take 10 mg by mouth nightly as needed (insomnia) 90 tablet 1    COMFORT ASSIST INSULIN SYRINGE 31G X 5/16\" 1 ML MISC       aspirin 81 MG EC tablet Take 81 mg by mouth daily.       Spacer/Aero Chamber Mouthpiece MISC 1 each by Does not apply route once as needed (to be used with his inhalers) 1 each 0     No current sleep disturbance. Negative for decreased concentration, self-injury and suicidal ideas. The patient is nervous/anxious.          -vital signs stable and within normal limits except Morbid obesity per BMI and low pulse ox while on 2 L/min of O2  /60   Pulse 78   Ht 6' (1.829 m)   Wt (!) 411 lb 9.6 oz (186.7 kg)   SpO2 96%   BMI 55.82 kg/m²        Physical Exam   Constitutional: He is oriented to person, place, and time. He appears well-developed and well-nourished. No distress. HENT:   Head: Normocephalic and atraumatic. Nose: Nose normal.   Mouth/Throat: Oropharynx is clear and moist. No oropharyngeal exudate. Mallampati  4. Very dry scaly skin bilateral ear canals, he follows with ENT   Eyes: Conjunctivae and EOM are normal. Right eye exhibits no discharge. Left eye exhibits no discharge. No scleral icterus. Neck: Normal range of motion. Neck supple. No JVD present. No thyromegaly present. Cardiovascular: Normal rate, regular rhythm, normal heart sounds and intact distal pulses. Pulmonary/Chest: Effort normal. No respiratory distress. He has decreased breath sounds in the right lower field and the left lower field. He has no wheezes. He has no rales. He exhibits no tenderness. He has been breathing through pursed lips, looks short of breath, we gave him oxygen at 2 L/min which helped, he says he feels better   Abdominal: Soft. Bowel sounds are normal. He exhibits no distension. There is no hepatosplenomegaly. There is no tenderness. There is no rigidity, no rebound and no guarding. Very Obese abdomen. Musculoskeletal: He exhibits tenderness. He exhibits no edema. Lumbar back: He exhibits decreased range of motion, tenderness, bony tenderness, pain and spasm. Ambulates with cane, antalgic sitting and walking noted. Old vertical lumbar surgical scar. Neurological: He is alert and oriented to person, place, and time. No cranial nerve deficit. He exhibits normal muscle tone. Coordination normal.   Skin: Skin is warm and dry. Capillary refill takes less than 2 seconds. Rash noted. He is not diaphoretic. Left armpit with large area of induration extending to the left upper chest, mild redness about 5 cm, with central pustule 2x2 mm, there is no draining. There is tenderness, and mild warm over the red area of cellulitis. Right armpit with light erythematous area overlapping Acanthosis nigricans on the neck, consistent with intertrigo. Bilateral stasis dermatitis, brownish-reddish and significant dry skin on the legs. Psychiatric: His behavior is normal. Judgment and thought content normal. His mood appears anxious. His affect is labile. His speech is rapid and/or pressured. He exhibits a depressed mood. Nursing note and vitals reviewed. I personally reviewed testing with patient. anemia of chronic disease   Chronic kidney disease stage 3    Otherwise labs within normal limits    Lab Results   Component Value Date    WBC 7.6 03/28/2019    HGB 12.5 (L) 03/28/2019    HCT 37.2 (L) 03/28/2019    MCV 85.6 03/28/2019     03/28/2019       Lab Results   Component Value Date     05/02/2019    K 3.9 05/02/2019     05/02/2019    CO2 25 05/02/2019    BUN 37 05/02/2019    CREATININE 1.63 05/02/2019    GLUCOSE 96 03/28/2019    CALCIUM 9.2 03/28/2019            Lab Results   Component Value Date    TSH 1.97 02/22/2019       Lab Results   Component Value Date    LABA1C 8.2 (H) 04/15/2019       ASSESSMENT AND PLAN      1. Cellulitis of axilla, left  Failing to change as expected. - CBC Auto Differential; Future  - Sedimentation Rate; Future  - C-Reactive Protein; Future  - clindamycin (CLEOCIN) 300 MG capsule; Take 1 capsule by mouth 4 times daily for 10 days  Dispense: 40 capsule; Refill: 0  - Lactobacillus (PROBIOTIC ACIDOPHILUS) TABS; Take 1 tablet by mouth 2 times daily  Dispense: 60 tablet;  Refill: 0      I discussed with patient and his wife options to either direct admission, or workup outpatient and try a second antibiotic  He chose to have the blood work now, and will  the antibiotics from the pharmacy  If abnormal blood work showing leukocytosis, then he will need direct admission, if not we'll continue with by mouth antibiotics-with Clindamycin    Patient was strongly advised to call me on Friday afternoon if the symptoms are not improving and then will do a direct admission for failure of second antibiotic and will need IV vancomycin treatment. Both patient and wife verbalize understanding and agree with plan    2. Chronic diastolic congestive heart failure (HCC)  Stable  Lab Results   Component Value Date    LVEF 53 03/29/2018    LVEFMODE Echo 08/29/2017     There is  unintentional weight gain , but no leg edema  Will do basic labs   - CBC Auto Differential; Future  - Comprehensive Metabolic Panel; Future  - Brain Natriuretic Peptide; Future    3. Essential hypertension  Well controlled  Discussed low salt diet and BP and pulse monitoring daily, BP log given   - CBC Auto Differential; Future  - Comprehensive Metabolic Panel; Future    4. Intertrigo  worsening  - nystatin (MYCOSTATIN) 322535 UNIT/GM powder; Apply 2- 3 times daily skin folds  Dispense: 60 g; Refill: 3    5. Type 2 diabetes mellitus with diabetic polyneuropathy, with long-term current use of insulin (HCC)  Improving  Lab Results   Component Value Date    LABA1C 8.2 (H) 04/15/2019    LABA1C 10.7 01/18/2019    LABA1C 9.2 09/25/2018   Continue current treatment and close monitoring     6. Encounter for screening for other viral diseases    - Hepatitis B Surface Antibody;  Future      Data Unavailable    Orders Placed This Encounter   Procedures    Hepatitis B Surface Antibody     Standing Status:   Future     Number of Occurrences:   1     Standing Expiration Date:   12/9/2019    CBC Auto Differential     Standing Status:   Future     Number of Occurrences:   1     Standing Expiration Date: 12/9/2019    Comprehensive Metabolic Panel     Standing Status:   Future     Number of Occurrences:   1     Standing Expiration Date:   12/9/2019    Brain Natriuretic Peptide     Standing Status:   Future     Number of Occurrences:   1     Standing Expiration Date:   12/9/2019    Sedimentation Rate     Standing Status:   Future     Number of Occurrences:   1     Standing Expiration Date:   12/9/2019    C-Reactive Protein     Standing Status:   Future     Number of Occurrences:   1     Standing Expiration Date:   12/9/2019       Orders Placed This Encounter   Medications    clindamycin (CLEOCIN) 300 MG capsule     Sig: Take 1 capsule by mouth 4 times daily for 10 days     Dispense:  40 capsule     Refill:  0    Lactobacillus (PROBIOTIC ACIDOPHILUS) TABS     Sig: Take 1 tablet by mouth 2 times daily     Dispense:  60 tablet     Refill:  0    nystatin (MYCOSTATIN) 854772 UNIT/GM powder     Sig: Apply 2- 3 times daily skin folds     Dispense:  60 g     Refill:  3       There are no discontinued medications. iMguel received counseling on the following healthy behaviors: nutrition, exercise, medication adherence, tobacco cessation and weight loss . Says he doesn't have cigarettes in the house anymore, advised to continue to abstain from smoking    Reviewed prior labs and health maintenance. Continue current medications, diet and exercise. Discussed use, benefit, and side effects of prescribed medications. Barriers to medication compliance addressed. Patient given educational materials - see patient instructions. All patient questions answered. Patient voiced understanding. Thepatient's past medical, surgical, social, and family history as well as his   current medications and allergies were reviewed as documented in today's encounter. Medications, labs, diagnostic studies,consultations and follow-up as documented in this encounter. Return in about 3 weeks (around 5/30/2019).     Patient was seen with total face to face time of  25 minutes. More than 50% of this visit was counseling and education. Future Appointments   Date Time Provider William Hernandez   5/23/2019  7:30 AM [facility] CHF CLINIC RM 1 [facility] CHFCLIN None   5/23/2019  8:10 AM CZ MEDICATION MGMT [facility] MED MGMT [facility]   5/29/2019 11:00 AM Duc Harris MD [facility]   6/19/2019  9:30 AM Duc Harris MD [facility]   8/1/2019  9:00 AM Rimma Argueta MD Resp Spec [facility]     This note was completed by using the assistance of a speech-recognition program. However, inadvertent computerized transcription errors may be present. Although every effort was made to ensure accuracy, no guarantees can be provided that every mistake has been identified and corrected by editing.     Electronically signed by Duc Harris MD on 5/14/2019 at 9:36 AM

## 2019-05-14 PROBLEM — J44.1 COPD EXACERBATION (HCC): Status: RESOLVED | Noted: 2019-03-28 | Resolved: 2019-05-14

## 2019-05-14 PROBLEM — L03.112 CELLULITIS OF AXILLA, LEFT: Status: ACTIVE | Noted: 2019-05-14

## 2019-05-21 ENCOUNTER — PATIENT MESSAGE (OUTPATIENT)
Dept: FAMILY MEDICINE CLINIC | Age: 54
End: 2019-05-21

## 2019-05-21 DIAGNOSIS — E78.5 HYPERLIPIDEMIA WITH TARGET LDL LESS THAN 70: ICD-10-CM

## 2019-05-22 ENCOUNTER — TELEPHONE (OUTPATIENT)
Dept: PHARMACY | Age: 54
End: 2019-05-22

## 2019-05-22 RX ORDER — PRAVASTATIN SODIUM 40 MG
40 TABLET ORAL NIGHTLY
Qty: 90 TABLET | Refills: 3 | Status: SHIPPED | OUTPATIENT
Start: 2019-05-22 | End: 2020-05-20 | Stop reason: SDUPTHER

## 2019-05-22 NOTE — TELEPHONE ENCOUNTER
Patient spoke with Rene Grace in [facility], his sister passed away so is unable to keep appointment for DM 5/23.   Patient will contach [facility] or us to reschedule when he is able    Sarika Kennedy, [address] [place] [address] Medication Management Service  [phone]

## 2019-05-22 NOTE — TELEPHONE ENCOUNTER
From: Dilan Winn.   To: Abdirahman Julian MD  Sent: 5/21/2019 11:31 PM EDT  Subject: Prescription Question    Can you please Refill my PRAVASTATIN SODIUM 40 MG TAB TAKE ONE EVER EVENING THANK you

## 2019-05-23 ENCOUNTER — TELEPHONE (OUTPATIENT)
Dept: PHARMACY | Age: 54
End: 2019-05-23

## 2019-05-23 ENCOUNTER — APPOINTMENT (OUTPATIENT)
Dept: PHARMACY | Age: 54
End: 2019-05-23
Payer: COMMERCIAL

## 2019-05-23 NOTE — TELEPHONE ENCOUNTER
Patient cancelled appt for med mgmt for diabetes mgmt due to sisters passing and attending  today. Will call back to reschedule.

## 2019-05-29 ENCOUNTER — OFFICE VISIT (OUTPATIENT)
Dept: FAMILY MEDICINE CLINIC | Age: 54
End: 2019-05-29
Payer: COMMERCIAL

## 2019-05-29 VITALS
HEART RATE: 85 BPM | DIASTOLIC BLOOD PRESSURE: 64 MMHG | HEIGHT: 72 IN | BODY MASS INDEX: 42.66 KG/M2 | SYSTOLIC BLOOD PRESSURE: 136 MMHG | WEIGHT: 315 LBS | OXYGEN SATURATION: 97 %

## 2019-05-29 DIAGNOSIS — Z79.4 TYPE 2 DIABETES MELLITUS WITH DIABETIC POLYNEUROPATHY, WITH LONG-TERM CURRENT USE OF INSULIN (HCC): ICD-10-CM

## 2019-05-29 DIAGNOSIS — Z23 NEED FOR VIRAL IMMUNIZATION: ICD-10-CM

## 2019-05-29 DIAGNOSIS — I10 ESSENTIAL HYPERTENSION: ICD-10-CM

## 2019-05-29 DIAGNOSIS — G89.29 CHRONIC MIDLINE LOW BACK PAIN WITH LEFT-SIDED SCIATICA: ICD-10-CM

## 2019-05-29 DIAGNOSIS — E11.42 TYPE 2 DIABETES MELLITUS WITH DIABETIC POLYNEUROPATHY, WITH LONG-TERM CURRENT USE OF INSULIN (HCC): ICD-10-CM

## 2019-05-29 DIAGNOSIS — N43.3 HYDROCELE, BILATERAL: ICD-10-CM

## 2019-05-29 DIAGNOSIS — H91.93 BILATERAL HEARING LOSS, UNSPECIFIED HEARING LOSS TYPE: ICD-10-CM

## 2019-05-29 DIAGNOSIS — M96.1 POSTLAMINECTOMY SYNDROME OF LUMBAR REGION: ICD-10-CM

## 2019-05-29 DIAGNOSIS — J44.9 MIXED TYPE COPD (CHRONIC OBSTRUCTIVE PULMONARY DISEASE) (HCC): ICD-10-CM

## 2019-05-29 DIAGNOSIS — M54.42 CHRONIC MIDLINE LOW BACK PAIN WITH LEFT-SIDED SCIATICA: ICD-10-CM

## 2019-05-29 DIAGNOSIS — N18.30 CKD (CHRONIC KIDNEY DISEASE) STAGE 3, GFR 30-59 ML/MIN (HCC): ICD-10-CM

## 2019-05-29 DIAGNOSIS — I50.32 CHRONIC DIASTOLIC CONGESTIVE HEART FAILURE (HCC): ICD-10-CM

## 2019-05-29 DIAGNOSIS — H60.63 CHRONIC INFECTION OF BOTH EXTERNAL EARS: Primary | ICD-10-CM

## 2019-05-29 DIAGNOSIS — M51.36 DDD (DEGENERATIVE DISC DISEASE), LUMBAR: ICD-10-CM

## 2019-05-29 PROCEDURE — 3045F PR MOST RECENT HEMOGLOBIN A1C LEVEL 7.0-9.0%: CPT | Performed by: FAMILY MEDICINE

## 2019-05-29 PROCEDURE — G8598 ASA/ANTIPLAT THER USED: HCPCS | Performed by: FAMILY MEDICINE

## 2019-05-29 PROCEDURE — G8427 DOCREV CUR MEDS BY ELIG CLIN: HCPCS | Performed by: FAMILY MEDICINE

## 2019-05-29 PROCEDURE — G0010 ADMIN HEPATITIS B VACCINE: HCPCS | Performed by: FAMILY MEDICINE

## 2019-05-29 PROCEDURE — 1036F TOBACCO NON-USER: CPT | Performed by: FAMILY MEDICINE

## 2019-05-29 PROCEDURE — 2022F DILAT RTA XM EVC RTNOPTHY: CPT | Performed by: FAMILY MEDICINE

## 2019-05-29 PROCEDURE — 3017F COLORECTAL CA SCREEN DOC REV: CPT | Performed by: FAMILY MEDICINE

## 2019-05-29 PROCEDURE — 90746 HEPB VACCINE 3 DOSE ADULT IM: CPT | Performed by: FAMILY MEDICINE

## 2019-05-29 PROCEDURE — G8417 CALC BMI ABV UP PARAM F/U: HCPCS | Performed by: FAMILY MEDICINE

## 2019-05-29 PROCEDURE — 99214 OFFICE O/P EST MOD 30 MIN: CPT | Performed by: FAMILY MEDICINE

## 2019-05-29 RX ORDER — OXYCODONE HYDROCHLORIDE 10 MG/1
10 TABLET ORAL EVERY 8 HOURS PRN
Qty: 90 TABLET | Refills: 0 | Status: SHIPPED | OUTPATIENT
Start: 2019-05-29 | End: 2019-06-19 | Stop reason: SDUPTHER

## 2019-05-29 ASSESSMENT — ENCOUNTER SYMPTOMS
COUGH: 0
ABDOMINAL PAIN: 0
CONSTIPATION: 0
ABDOMINAL DISTENTION: 0
NAUSEA: 0
VOMITING: 0
CHEST TIGHTNESS: 0
SHORTNESS OF BREATH: 1
BACK PAIN: 1
DIARRHEA: 0
APNEA: 1
WHEEZING: 0

## 2019-05-29 NOTE — PROGRESS NOTES
Chief Complaint   Patient presents with    Cellulitis     resolved    Congestive Heart Failure    Coronary Artery Disease    Hypertension    Diabetes    Hearing Problem     would like referral to Dr. May Singh ENT       Deny Navarro.  here today for follow up on chronic medical problems, go over labs and/or diagnostic studies, and medication refills. Cellulitis (resolved); Congestive Heart Failure; Coronary Artery Disease; Hypertension; Diabetes; and Hearing Problem (would like referral to Dr. May Singh ENT)    Ear problem  Miguel complains of Losing hearing, and persistent rash on the ears. Says he hears the heart beats in his ears  Still using ciprodex, but not helping  His ears are constantly swollen and hurt, gets infections on and off, and sometimes has drainage. Needs to go back to ENT and would like a referral      Miguel complains of hernia in the groin. He says he has testicular enlargement bilateral, right greater than the left side, it is hard to bend over due to swelling, worsening  Has Erectile Dysfunction. Onset of scrotal swelling was about 4 years ago, progressively getting worse. The swelling doesn't get bigger when coughing, but does hurt at times. CT pelvis 12/16/18 showed bilateral hydrocele  1. Findings in subcutaneous tissues of the right groin suspicious for   cellulitis.  No soft tissue emphysema.  No drainable fluid collection. 2. Large bilateral hydrocele. Diabetes Mellitus Type II, Follow-up: Patient here for follow-up ofType 2 diabetes mellitus. Current symptoms/problems include hyperglycemia, paresthesia of the feet and visual disturbances   Symptoms have been present for several years.     Known diabetic complications: nephropathy, peripheral neuropathy and cardiovascular disease    Cardiovascular risk factors: diabetes mellitus, dyslipidemia, hypertension, male gender, microalbuminuria, obesity (BMI >= 30 kg/m2), sedentary lifestyle and smoking/ tobacco follow-up of elevated blood pressure. He is not  exercising and is adherent to low salt diet. Blood pressure is well controlled at home. Cardiac symptoms  dyspnea, fatigue and orthopnea. Patient denies  chest pain, chest pressure/discomfort, claudication, exertional chest pressure/discomfort, irregular heart beat, lower extremity edema, near-syncope, orthopnea, palpitations, paroxysmal nocturnal dyspnea, syncope and tachypnea. Use of agents associated with hypertension: none. History of target organ damage: angina/ prior myocardial infarction, chronic kidney disease, heart failure and prior coronary revascularization. Has 1 stent  Goes to cardiology and [facility] Clinic       Lab Results   Component Value Date    LVEF 53 03/29/2018    LVEFMODE Echo 08/29/2017       BP controlled. Miguel reports compliance with BP medications, and tolerates them well, denies side effects. BP Readings from Last 3 Encounters:   05/29/19 136/64   05/09/19 118/60   05/02/19 136/60        Pulse Readings from Last 3 Encounters:   05/29/19 85   05/09/19 78   05/02/19 102     Patient has chronic low back pain, for many years, midline, radiating to the left side. Pain is worse with activities and at night. Has history of back surgery and failed postlaminectomy syndrome  Has been on pain medication for a long time. He has chronic kidney disease and he cannot take NSAIDs  Intensity of pain is 5/10 today. Gabapentin and oxycodone make his pain more tolerable, and he is able to attend and do his ADLs    Has Depression and grief. Doesn't want to change Effexor to Wellbutrin  Says he is fine for now. He is happy he quit smoking.       Current Outpatient Medications   Medication Sig Dispense Refill    pravastatin (PRAVACHOL) 40 MG tablet Take 1 tablet by mouth nightly 90 tablet 3    Lactobacillus (PROBIOTIC ACIDOPHILUS) TABS Take 1 tablet by mouth 2 times daily 60 tablet 0    nystatin (MYCOSTATIN) 127761 UNIT/GM powder Apply 2- 3 times daily skin folds 60 g 3    varenicline (CHANTIX) 1 MG tablet Take 1 tablet by mouth 2 times daily 60 tablet 1    oxyCODONE HCl (OXY-IR) 10 MG immediate release tablet Take 1 tablet by mouth every 8 hours as needed for Pain for up to 30 days. 90 tablet 0    Lidocaine 4 % LOTN Apply topically      blood glucose test strips (ONE TOUCH ULTRA TEST) strip USE ONE STRIP TO TEST THREE TIMES A  strip 2    metolazone (ZAROXOLYN) 2.5 MG tablet Take 2.5 mg by mouth Twice a Week      SPIRIVA HANDIHALER 18 MCG inhalation capsule INHALE THE ENTIRE CONTENTS OF 1 CAPSULE ONCE A DAY USING HANDIHALER DEVICE 60 capsule 10    PROAIR  (90 Base) MCG/ACT inhaler INHALE TWO PUFFS BY MOUTH EVERY 6 HOURS AS NEEDED FOR WHEEZING OR SHORTNESS OF BREATH 1 Inhaler 4    spironolactone (ALDACTONE) 50 MG tablet Take 1 tablet by mouth daily 90 tablet 3    vitamin D (CHOLECALCIFEROL) 1000 UNIT TABS tablet Take 1 tablet by mouth daily 90 tablet 3    docusate sodium (STOOL SOFTENER) 100 MG capsule Take 1 capsule by mouth 2 times daily 180 capsule 3    venlafaxine (EFFEXOR) 100 MG tablet Take 1 tablet by mouth 2 times daily 60 tablet 3    Handicap Placard MISC by Does not apply route Can't walk greater than 200 feet. Expires in 5 years. 1 each 0    tiZANidine (ZANAFLEX) 4 MG tablet TAKE ONE TABLET BY MOUTH EVERY 8 HOURS AS NEEDED FOR BACK PAIN 90 tablet 5    pantoprazole (PROTONIX) 40 MG tablet Take 1 tablet by mouth nightly 90 tablet 3    miconazole (MICOTIN) 2 % powder Apply topically 2 times daily. 45 g 1    Ferrous Sulfate (IRON) 325 (65 Fe) MG TABS Take 1 tablet by mouth daily 90 tablet 3    metoprolol tartrate (LOPRESSOR) 50 MG tablet Take 1 tablet by mouth 2 times daily 180 tablet 3    lisinopril (PRINIVIL;ZESTRIL) 5 MG tablet Take 1 tablet by mouth daily . Discontinued Lisinopril  10 mg 90 tablet 3    clopidogrel (PLAVIX) 75 MG tablet Take 1 tablet by mouth daily 90 tablet 2    bumetanide (BUMEX) 1 MG tablet Take 3 tablets by mouth 2 times daily 180 tablet 1    albuterol (PROVENTIL) (2.5 MG/3ML) 0.083% nebulizer solution Take 3 mLs by nebulization every 6 hours as needed for Wheezing or Shortness of Breath 120 vial 0    fluticasone-salmeterol (ADVAIR HFA) 230-21 MCG/ACT inhaler Inhale 2 puffs into the lungs 2 times daily 12 g 5    insulin regular human (HUMULIN R) 500 UNIT/ML concentrated injection vial Inject into the skin 3 times daily Indications: 50 in the morning 50 at lunch and 40 at dinner. Inject 250 units with breakfast and lunch, and inject 175 units with dinner.  nitroGLYCERIN (NITROSTAT) 0.4 MG SL tablet Place 1 tablet under the tongue every 5 minutes as needed for Chest pain 25 tablet 3    fluticasone (CUTIVATE) 0.05 % cream Apply topically 2 times daily       fluticasone (FLONASE) 50 MCG/ACT nasal spray 2 sprays by Nasal route daily 1 Bottle 3    Melatonin 10 MG TABS Take 10 mg by mouth nightly as needed (insomnia) 90 tablet 1    COMFORT ASSIST INSULIN SYRINGE 31G X 5/16\" 1 ML MISC       aspirin 81 MG EC tablet Take 81 mg by mouth daily.  Spacer/Aero Chamber Mouthpiece MISC 1 each by Does not apply route once as needed (to be used with his inhalers) 1 each 0     No current facility-administered medications for this visit. Allergies   Allergen Reactions    Lorazepam      Falls      Nsaids      CHF&CKD    Levofloxacin Nausea And Vomiting        Rest of complaints with corresponding details per ROS      The patient'spast medical, surgical, social, and family history as well as his current medications and allergies were reviewed as documented in today's encounter.       Social History     Tobacco Use    Smoking status: Former Smoker     Packs/day: 2.00     Years: 33.00     Pack years: 66.00     Types: Cigarettes     Start date: 1985     Last attempt to quit: 2019     Years since quittin.1    Smokeless tobacco: Former User     Types: Snuff     Quit date: 1995 Substance Use Topics    Alcohol use: No     Alcohol/week: 0.0 oz    Drug use: Yes     Types: Marijuana     Comment: 2 \"cigarettes a day, mainly at night\" 3 days ago       Counseling given: Yes                Review of Systems   Constitutional: Positive for fatigue and unexpected weight change. Negative for activity change, appetite change, chills, diaphoresis and fever. HENT: Positive for ear discharge, ear pain and hearing loss. Negative for congestion. Eyes: Positive for visual disturbance. Respiratory: Positive for apnea (on CPAP) and shortness of breath. Negative for cough, chest tightness and wheezing. Not using home oxygen anymore since he quit smoking   Cardiovascular: Negative for chest pain, palpitations and leg swelling. Gastrointestinal: Negative for abdominal distention, abdominal pain, constipation, diarrhea, nausea and vomiting. Endocrine: Negative for cold intolerance, heat intolerance, polydipsia, polyphagia and polyuria. Genitourinary: Positive for frequency, penile swelling and scrotal swelling. Negative for difficulty urinating and urgency. Musculoskeletal: Positive for back pain and gait problem. Ambulates with cane   Skin: Positive for rash (ears, legs). Allergic/Immunologic: Positive for immunocompromised state (due to diabetes). Neurological: Positive for numbness (and burning of his legs and feet since 2000). Hematological: Bruises/bleeds easily. Psychiatric/Behavioral: Positive for dysphoric mood and sleep disturbance. Negative for self-injury and suicidal ideas. The patient is nervous/anxious.          -vital signs stable and within normal limits except Morbid obesity per BMI. /64   Pulse 85   Ht 6' (1.829 m)   Wt (!) 406 lb (184.2 kg)   SpO2 97%   BMI 55.06 kg/m²         Physical Exam   Constitutional: He is oriented to person, place, and time. He appears well-developed and well-nourished. No distress.    HENT:   Head: Normocephalic and atraumatic. Right Ear: There is swelling and tenderness. Decreased hearing is noted. Left Ear: There is swelling and tenderness. Decreased hearing is noted. Nose: Nose normal.   Mouth/Throat: Oropharynx is clear and moist. No oropharyngeal exudate. Mallampati  4. Very dry scaly skin bilateral ear canals   Eyes: Conjunctivae and EOM are normal. Right eye exhibits no discharge. Left eye exhibits no discharge. No scleral icterus. Neck: Normal range of motion. Neck supple. No JVD present. No thyromegaly present. Cardiovascular: Normal rate, regular rhythm, normal heart sounds and intact distal pulses. Pulmonary/Chest: Effort normal. No respiratory distress. He has decreased breath sounds in the right lower field and the left lower field. He has no wheezes. He has no rales. He exhibits no tenderness. He has been breathing through pursed lips, looks less SOB than at last appointment    Abdominal: Soft. Bowel sounds are normal. He exhibits no distension. There is no hepatosplenomegaly. There is no tenderness. There is no rigidity, no rebound and no guarding. Very Obese abdomen. Musculoskeletal: He exhibits tenderness. He exhibits no edema. Lumbar back: He exhibits decreased range of motion, tenderness, bony tenderness, pain and spasm. Ambulates with cane  Old vertical lumbar surgical scar. Neurological: He is alert and oriented to person, place, and time. No cranial nerve deficit. He exhibits normal muscle tone. Coordination normal.   Skin: Skin is warm and dry. Capillary refill takes less than 2 seconds. Rash noted. He is not diaphoretic. Acanthosis nigricans on the neck  Bilateral stasis dermatitis, brownish-reddish and significant dry skin on the legs. Psychiatric: His behavior is normal. Judgment and thought content normal. His mood appears anxious. His affect is labile. His speech is rapid and/or pressured. He exhibits a depressed mood.    Nursing note and vitals reviewed. Discussed testing withthe patient and all questions fully answered. I personally reviewed testing with patient. anemia of chronic disease   He is also due for recall colonoscopy   Hyperglycemia  Chronic kidney disease stage 3 Improving  hypertriglyceridemia        Otherwise labs within normal limits        Lab Results   Component Value Date    WBC 9.9 05/09/2019    HGB 11.4 (L) 05/09/2019    HCT 35.4 (L) 05/09/2019    MCV 88.5 05/09/2019     05/09/2019       Lab Results   Component Value Date     05/09/2019    K 4.5 05/09/2019     05/09/2019    CO2 19 05/09/2019    BUN 25 05/09/2019    CREATININE 1.40 05/09/2019    GLUCOSE 156 05/09/2019    CALCIUM 8.9 05/09/2019        Lab Results   Component Value Date    ALT 25 05/09/2019    AST 19 05/09/2019    ALKPHOS 61 05/09/2019    BILITOT 0.35 05/09/2019       Lab Results   Component Value Date    TSH 1.97 02/22/2019       Lab Results   Component Value Date    CHOL 161 09/20/2018    CHOL 172 08/16/2017    CHOL 134 12/26/2016     Lab Results   Component Value Date    TRIG 312 (H) 09/20/2018    TRIG 278 (H) 08/16/2017    TRIG 199 (H) 12/26/2016     Lab Results   Component Value Date    HDL 28 (L) 09/20/2018    HDL 27 (L) 08/16/2017    HDL 28 (L) 12/26/2016     Lab Results   Component Value Date    LDLCHOLESTEROL 71 09/20/2018    LDLCHOLESTEROL 89 08/16/2017    LDLCHOLESTEROL 66 12/26/2016     Lab Results   Component Value Date    CHOLHDLRATIO 5.8 (H) 09/20/2018    CHOLHDLRATIO 6.4 (H) 08/16/2017    CHOLHDLRATIO 4.8 12/26/2016         Lab Results   Component Value Date    ZAABFMYJ04 379 02/22/2019     Lab Results   Component Value Date    FOLATE 18.6 02/22/2019     Lab Results   Component Value Date    VITD25 41.2 02/22/2019           ASSESSMENT AND PLAN  1. Chronic infection of both external ears  Failing to change as expected. - Cam Galindo MD, Otolaryngology, [place]    2.  Hydrocele, bilateral  worsening  - [facility] - Postlaminectomy syndrome of lumbar region  stable  - oxyCODONE HCl (OXY-IR) 10 MG immediate release tablet; Take 1 tablet by mouth every 8 hours as needed for Pain for up to 30 days. Dispense: 90 tablet; Refill: 0  Review of [place] does not show any aberrant prescription behavior. Medication is helping the patient stay active. Patient denies any side effects and reports improved analgesia. No sign of misuse/abuse. 10. CKD (chronic kidney disease) stage 3, GFR 30-59 ml/min ([facility])  Improving  Will continue to monitor. Cannot take NSAIDs      - oxyCODONE HCl (OXY-IR) 10 MG immediate release tablet; Take 1 tablet by mouth every 8 hours as needed for Pain for up to 30 days. Dispense: 90 tablet; Refill: 0    11. Need for viral immunization  He is not immune against hep B  - Hep B Vaccine Adult (ENGERIX-B)        Cancel 6/19      Controlled Substances Monitoring:     RX Monitoring 5/29/2019   Attestation The Prescription Monitoring Report for this patient was reviewed today. Chronic Pain Routine Monitoring Possible medication side effects, risk of tolerance/dependence & alternative treatments discussed. ;No signs of potential drug abuse or diversion identified: otherwise, see note documentation   Chronic Pain > 50 MEDD Obtained or confirmened \"Consent of Opioid Use\" on file.    Chronic Pain > 80 MEDD -       Orders Placed This Encounter   Procedures    US Scrotum and Testicles     Standing Status:   Future     Number of Occurrences:   1     Standing Expiration Date:   5/28/2020    Hep B Vaccine Adult (ENGERIX-B)    Basic Metabolic Panel     Standing Status:   Future     Number of Occurrences:   1     Standing Expiration Date:   5/28/2020    GENOVEVA - Rosalina Farias MD, Otolaryngology, [place]     Referral Priority:   Routine     Referral Type:   Eval and Treat     Referral Reason:   Specialty Services Required     Referred to Provider:   Leslie Amos MD     Requested Specialty:   Otolaryngology     Number of Visits Requested:   Lorraine Llamas MD, Urology, [place]     Referral Priority:   Routine     Referral Type:   Eval and Treat     Referral Reason:   Specialty Services Required     Referred to Provider:   Dina Infante MD     Requested Specialty:   Urology     Number of Visits Requested:   1       Medications Discontinued During This Encounter   Medication Reason    oxyCODONE HCl (OXY-IR) 10 MG immediate release tablet REORDER         Miguel received counseling on the following healthy behaviors: nutrition, exercise, medication adherence, tobacco cessation and weight loss    Reviewed prior labs and health maintenance  Continue current medications, diet and exercise. Discussed use, benefit, and side effects of prescribed medications. Barriers to medication compliance addressed. Patient given educational materials - see patient instructions  Was a self-tracking handout given in paper form or via [employer]? Yes    Requested Prescriptions     Signed Prescriptions Disp Refills    oxyCODONE HCl (OXY-IR) 10 MG immediate release tablet 90 tablet 0     Sig: Take 1 tablet by mouth every 8 hours as needed for Pain for up to 30 days. All patient questions answered. Patient voiced understanding. Quality Measures    Body mass index is 55.06 kg/m². Elevated. Weight control planned discussed conventional weight loss and Healthy diet and regular exercise. BP: 136/64 Blood pressure is normal. Treatment plan consists of Weight Reduction, DASH Eating Plan, Dietary Sodium Restriction, Increased Physical Activity, Avoid Tobacco and Second-hand Smoke, Patient In-home Blood Pressure Monitoring and No treatment change needed.       LDL controlled    Lab Results   Component Value Date    LDLCHOLESTEROL 71 09/20/2018    LDLDIRECT 72 03/13/2013    (goal LDL reduction with dx if diabetes is 50% LDL reduction)        Moderate depression, declines changing Effexor    PHQ Scores 3/15/2019 2/22/2019 10/23/2018 9/18/2018 8/18/2017 6/23/2017 9/23/2016   PHQ2 Score 6 2 6 3 2 5 2   PHQ9 Score 17 11 11 6 14 21 13     Interpretation of Total Score Depression Severity: 1-4 = Minimal depression, 5-9 = Mild depression, 10-14 = Moderate depression, 15-19 = Moderately severe depression, 20-27 = Severe depression      The patient's past medical,surgical, social, and family history as well as his   current medications and allergies were reviewed as documented in today's encounter. Medications, labs, diagnostic studies, consultations and follow-up asdocumented in this encounter. Return in about 3 months (around 8/29/2019) for ALWAYS NEEDS 30 MIN. APPT, **Please obtain A1c from DR. MARTINEZ. Meri Diaz Patient was seen with total face to face time of  25 minutes. More than 50% of this visit was counseling and education. Future Appointments   Date Time Provider [place]   5/30/2019  7:30 AM [facility] RM 1 STCZ CHFCLIN None   6/4/2019  7:50 AM [facility] MEDICATION MGMT [facility] MED MGMT [facility]   7/31/2019  9:00 AM [facility]ED[facility]   8/1/2019  9:00 AM Eliza Mcarthur MD Resp Spec Yarely Yu   8/30/2019 10:00 AM [facility]       This note was completed by using the assistance of a speech-recognition program. However, inadvertent computerized transcription errors may be present. Although every effort was made to ensure accuracy, no guarantees can be provided that every mistake has been identified and corrected by editing . Electronically signed by Linnette Sosa MD on 6/3/2019 at 3:42 PM     Addendum made on 6/4/2019 at 4:56 PM  Paul Lino has known COPD, currently having exacerbation and acute bronchitis  He never received the nebulizer treatment ordered in October, will order it again     Diagnosis Orders   1. Chronic infection of both external ears  Cam Roy MD, Otolaryngology, [place]   2.  Hydrocele, bilateral  Flory Mckeon MD, Urology, [address]

## 2019-05-29 NOTE — PROGRESS NOTES
Visit Information    Have you changed or started any medications since your last visit including any over-the-counter medicines, vitamins, or herbal medicines? no   Are you having any side effects from any of your medications? -  no  Have you stopped taking any of your medications? Is so, why? -  no    Have you seen any other physician or provider since your last visit? No  Have you had any other diagnostic tests since your last visit? No  Have you been seen in the emergency room and/or had an admission to a hospital since we last saw you? No  Have you had your routine dental cleaning in the past 6 months? no    Have you activated your [employer] account? If not, what are your barriers?  Yes     Patient Care Team:  Sheng Rae MD as PCP - General (Family Medicine)  Zuri Henry MD as Consulting Physician (Otolaryngology)  Jamie Lopez MD as Consulting Physician (Endocrinology)  Phil Rodgers DO as Consulting Physician (Cardiology)  Rony Lopez DPM as Surgeon (Podiatry)  Marichuy Caruso MD as Consulting Physician (Ophthalmology)  Paulo Hutchins MD as Consulting Physician (Nephrology)  Nataly Clement MD as Consulting Physician (Pulmonology)  Sammy Fox MD as Consulting Physician (Pulmonology)  Nitin Buchanan MD as Surgeon (Vascular Surgery)  Lisa Shaw MD as Consulting Physician (Gastroenterology)  Juan A Juarez, [address] [place] [address] as Pharmacist (Pharmacist)  Sammy Fox MD as Consulting Physician (Pulmonology)    Medical History Review  Past Medical, Family, and Social History reviewed and does contribute to the patient presenting condition    Health Maintenance   Topic Date Due    Hepatitis B Vaccine (1 of 3 - Risk 3-dose series) 12/30/1983    Shingles Vaccine (1 of 2) 12/30/2014    Colon cancer screen colonoscopy  11/03/2017    A1C test (Diabetic or Prediabetic)  07/15/2019    Lipid screen  09/20/2019    Diabetic retinal exam  10/24/2019    Diabetic foot exam 05/02/2020    Potassium monitoring  05/09/2020    Creatinine monitoring  05/09/2020    DTaP/Tdap/Td vaccine (2 - Td) 09/12/2028    Flu vaccine  Completed    Pneumococcal 0-64 years Vaccine  Completed    Hepatitis C screen  Completed    HIV screen  Completed

## 2019-05-30 ENCOUNTER — HOSPITAL ENCOUNTER (OUTPATIENT)
Age: 54
Discharge: HOME | End: 2019-05-30
Payer: COMMERCIAL

## 2019-05-30 ENCOUNTER — HOSPITAL ENCOUNTER (OUTPATIENT)
Dept: OTHER | Age: 54
Discharge: HOME | End: 2019-05-30
Payer: COMMERCIAL

## 2019-05-30 VITALS
RESPIRATION RATE: 20 BRPM | WEIGHT: 315 LBS | SYSTOLIC BLOOD PRESSURE: 112 MMHG | BODY MASS INDEX: 55.19 KG/M2 | HEART RATE: 71 BPM | OXYGEN SATURATION: 98 % | DIASTOLIC BLOOD PRESSURE: 60 MMHG

## 2019-05-30 DIAGNOSIS — I50.32 CHRONIC DIASTOLIC CONGESTIVE HEART FAILURE (HCC): ICD-10-CM

## 2019-05-30 LAB
ANION GAP SERPL CALCULATED.3IONS-SCNC: 12 MMOL/L (ref 9–17)
BUN BLDV-MCNC: 36 MG/DL (ref 6–20)
BUN/CREAT BLD: ABNORMAL (ref 9–20)
CALCIUM SERPL-MCNC: 8.6 MG/DL (ref 8.6–10.4)
CHLORIDE BLD-SCNC: 100 MMOL/L (ref 98–107)
CO2: 25 MMOL/L (ref 20–31)
CREAT SERPL-MCNC: 1.6 MG/DL (ref 0.7–1.2)
GFR AFRICAN AMERICAN: 55 ML/MIN
GFR NON-AFRICAN AMERICAN: 45 ML/MIN
GFR SERPL CREATININE-BSD FRML MDRD: ABNORMAL ML/MIN/{1.73_M2}
GFR SERPL CREATININE-BSD FRML MDRD: ABNORMAL ML/MIN/{1.73_M2}
GLUCOSE BLD-MCNC: 161 MG/DL (ref 70–99)
POTASSIUM SERPL-SCNC: 4.5 MMOL/L (ref 3.7–5.3)
SODIUM BLD-SCNC: 137 MMOL/L (ref 135–144)

## 2019-05-30 PROCEDURE — 80048 BASIC METABOLIC PNL TOTAL CA: CPT

## 2019-05-30 PROCEDURE — 36415 COLL VENOUS BLD VENIPUNCTURE: CPT

## 2019-05-30 PROCEDURE — 99211 OFF/OP EST MAY X REQ PHY/QHP: CPT

## 2019-05-30 NOTE — RESULT ENCOUNTER NOTE
Mychart comment sent to patient.     Blood glucose 161, slightly worsening kidney function but towards the baseline for the chronic kidney disease stage III, continue to monitor

## 2019-05-30 NOTE — PROGRESS NOTES
Pt canceled last week due to death of a sister. Continues to do well. Pt has not smoked at ALL for 34 days. Keeping track of his blood sugars and continues to participate well in his well being. Labs drawn per dr layton. Will recheck in 2 weeks just to keep him on track.

## 2019-05-31 ENCOUNTER — HOSPITAL ENCOUNTER (OUTPATIENT)
Dept: ULTRASOUND IMAGING | Age: 54
Discharge: HOME | End: 2019-06-02
Payer: COMMERCIAL

## 2019-05-31 DIAGNOSIS — N43.3 HYDROCELE, BILATERAL: ICD-10-CM

## 2019-05-31 PROCEDURE — 76870 US EXAM SCROTUM: CPT

## 2019-05-31 NOTE — RESULT ENCOUNTER NOTE
Mychart comment sent to patient. Large hydroceles, water in the scrotum, no hernia.  follow up with urology as referred  Has appointment with urology

## 2019-06-03 PROBLEM — H60.63: Status: ACTIVE | Noted: 2017-04-28

## 2019-06-03 PROBLEM — N43.3 HYDROCELE, BILATERAL: Status: ACTIVE | Noted: 2017-07-10

## 2019-06-03 PROBLEM — H91.93 BILATERAL HEARING LOSS: Status: ACTIVE | Noted: 2019-06-03

## 2019-06-03 PROBLEM — L03.112 CELLULITIS OF AXILLA, LEFT: Status: RESOLVED | Noted: 2019-05-14 | Resolved: 2019-06-03

## 2019-06-04 ENCOUNTER — HOSPITAL ENCOUNTER (OUTPATIENT)
Dept: PHARMACY | Age: 54
Setting detail: THERAPIES SERIES
Discharge: HOME | End: 2019-06-04
Payer: COMMERCIAL

## 2019-06-04 ENCOUNTER — TELEPHONE (OUTPATIENT)
Dept: FAMILY MEDICINE CLINIC | Age: 54
End: 2019-06-04

## 2019-06-04 ENCOUNTER — OFFICE VISIT (OUTPATIENT)
Dept: UROLOGY | Age: 54
End: 2019-06-04
Payer: COMMERCIAL

## 2019-06-04 VITALS
BODY MASS INDEX: 42.66 KG/M2 | HEART RATE: 96 BPM | WEIGHT: 315 LBS | TEMPERATURE: 98.1 F | DIASTOLIC BLOOD PRESSURE: 68 MMHG | SYSTOLIC BLOOD PRESSURE: 132 MMHG | HEIGHT: 72 IN

## 2019-06-04 VITALS — BODY MASS INDEX: 54.64 KG/M2 | WEIGHT: 315 LBS

## 2019-06-04 DIAGNOSIS — N43.0 ENCYSTED HYDROCELE: Primary | ICD-10-CM

## 2019-06-04 DIAGNOSIS — J44.1 COPD EXACERBATION (HCC): ICD-10-CM

## 2019-06-04 DIAGNOSIS — J20.9 ACUTE BRONCHITIS DUE TO INFECTION: Primary | ICD-10-CM

## 2019-06-04 PROCEDURE — G8598 ASA/ANTIPLAT THER USED: HCPCS | Performed by: UROLOGY

## 2019-06-04 PROCEDURE — G8427 DOCREV CUR MEDS BY ELIG CLIN: HCPCS | Performed by: UROLOGY

## 2019-06-04 PROCEDURE — 1036F TOBACCO NON-USER: CPT | Performed by: UROLOGY

## 2019-06-04 PROCEDURE — 99204 OFFICE O/P NEW MOD 45 MIN: CPT | Performed by: UROLOGY

## 2019-06-04 PROCEDURE — 99213 OFFICE O/P EST LOW 20 MIN: CPT

## 2019-06-04 PROCEDURE — 3017F COLORECTAL CA SCREEN DOC REV: CPT | Performed by: UROLOGY

## 2019-06-04 PROCEDURE — G8417 CALC BMI ABV UP PARAM F/U: HCPCS | Performed by: UROLOGY

## 2019-06-04 RX ORDER — DOXYCYCLINE HYCLATE 100 MG
100 TABLET ORAL 2 TIMES DAILY
Qty: 20 TABLET | Refills: 0 | Status: SHIPPED | OUTPATIENT
Start: 2019-06-04 | End: 2019-06-04 | Stop reason: ALTCHOICE

## 2019-06-04 RX ORDER — LANCING DEVICE
EACH MISCELLANEOUS
Qty: 1 EACH | Refills: 1 | Status: SHIPPED | OUTPATIENT
Start: 2019-06-04

## 2019-06-04 RX ORDER — ALBUTEROL SULFATE 2.5 MG/3ML
2.5 SOLUTION RESPIRATORY (INHALATION) EVERY 6 HOURS PRN
Qty: 120 VIAL | Refills: 0 | Status: SHIPPED | OUTPATIENT
Start: 2019-06-04 | End: 2020-02-13 | Stop reason: SDUPTHER

## 2019-06-04 ASSESSMENT — ENCOUNTER SYMPTOMS
EYE PAIN: 0
SHORTNESS OF BREATH: 1
BACK PAIN: 1
COUGH: 0
COLOR CHANGE: 0
VOMITING: 0
ABDOMINAL PAIN: 0
NAUSEA: 0
WHEEZING: 0
EYE REDNESS: 0

## 2019-06-04 NOTE — TELEPHONE ENCOUNTER
Called and spoke with Zach Sosa at [facility] on [address] and he informed me they never received an order for patients Nebulizer. He suggested a new order be placed with an updated date. Please advise.

## 2019-06-04 NOTE — LETTER
[facility]  [facility] [place]  [address]  [address]  [address] [place] [id number]  Dept: [phone]  Dept Fax: [phone]        6/4/19    Patient: Dinora Cardenas. YOB: 1965    Dear Sujatha Law MD,    I had the pleasure of seeing one of your patients, Orlando Lopez. today in the office today. Below are the relevant portions of my assessment and plan of care. IMPRESSION:     1. Encysted hydrocele        PLAN:        Has bilateral hydroceles, right greater than left. Do not cause enough bother for surgery. Needs PSA in December. F/U as needed. Prescriptions Ordered:  No orders of the defined types were placed in this encounter. Orders Placed:  No orders of the defined types were placed in this encounter. Thank you for allowing me to participate in the care of this patient. I will keep you updated on this patient's follow up and I look forward to serving you and your patients again in the future.         Adam Bashir MD

## 2019-06-04 NOTE — PROGRESS NOTES
Review of Systems   Constitutional: Negative for activity change, chills and fever. HENT: Positive for hearing loss. Eyes: Negative for pain, redness and visual disturbance. Respiratory: Positive for shortness of breath. Negative for cough and wheezing. Cardiovascular: Negative for chest pain and leg swelling. Gastrointestinal: Negative for abdominal pain, nausea and vomiting. Genitourinary: Positive for scrotal swelling. Negative for difficulty urinating, discharge, dysuria, flank pain, frequency, hematuria and testicular pain. Musculoskeletal: Positive for back pain. Negative for joint swelling and myalgias. Skin: Negative for color change and rash. Neurological: Negative for dizziness, tremors and numbness. Hematological: Negative for adenopathy. Does not bruise/bleed easily.

## 2019-06-04 NOTE — PROGRESS NOTES
Diabetic Medication Management   [address]    [address] [facility]. [place], [address] [facility]  Phone: [phone]  Fax: [phone]    NAME: Dilan Winn. MEDICAL RECORD NUMBER:  [id number]  AGE: 46 y.o. GENDER: male  : 1965  EPISODE DATE:  2019       Mr. Rusty Wharton was referred to [facility] by Dr. Michelle Maldonado, Special instructions include: titrate all medications (as defined in clinic's policy and procedure)    Goals per referral:   Fasting blood glucose: < 130  Peak postprandial glucose: < 180  A1C: < 7    Other goals:  Blood pressure goal: 130/80  Weight loss goal (~10%): Target weight  380 to be reached by date: 2019 (3-6 months)  Physical Activity goal:    10 minutes three times per week to be reached by date: 2019 (3-6 months)  Smoking Cessation   Quit Date: Patient stopped smoking on 19 with Chantix  Cholesterol at target:   Date: Yes LDL 71 No HDL 28 Trig 312 (18)  Annual eye exam:    Date: Yes - every 3 months  Comprehensive annual foot exam:   Date: Yes - every 2 months for regular foot care (Dr. Grisel Dahl)  Annual urine Albumin and serum creatinine:   Date: Yes 15 mg/L, SrCr 1.74 mg/dL        Subjective   Mr. Rusty Wharton is a 46 y.o. male here for the Diabetes Service for self-management education, medication review including over the counter medications and herbal products, overall well-being assessment, transition of care and any needed adjustments with updates and recommendations communicated to the referring physician. Patient's name and  verified. Patient Findings:  Patient continued to be significantly less SOB until about 3 days ago when his SOB increased especially with exertion such as walking. Patient states he was up to walking about a football field without significant SOB but the last few days 50 ft is difficult. Reports increased sputum production and change in color of sputum from white/clear to yellow/brown. Especially has issues with increased sputum first thing in the am.  Continues to use his spacer with all inhalers and taking as instructed. Continues to use his nebulizer for albuterol but only about 3 times per day as not home. Uses MDI albuterol when not at home. States nebulizer is about 11-9 years old and he does not think it is working as well as before. Sister passed rather unexpectedly about 2 1/2 weeks ago. Does admit to smoking 3 cigarettes on the day she passed but none since. Congratulated patient on success with smoking cessation and expressed sympathy about his sister. Discussed continuing Chantix for full 12 weeks to help him continue abstinence from smoking. Patient states he has been so busy figuring out what to do with all of her \"stuff\" and house that he has not exercised at all in last few weeks. Would like to get back to using his stationary bike about 5 min 1-2 times a day. Did see Dr. Sarahy Butterfield (PCP) on 5/29. Weight is slightly down today from previous appt. Continues eating less in quantity using smaller plate method. Patient reports finishing course of Keflex as well as Cleocin for cellulitis under arm. Unsure if this is resolved or if he will need further antibiotics. Will see a urologist today to follow up on some lower abdominal pain he is having. Per patient a hernia was ruled out. []  Missed doses   []  Emergency Room Visit    []  Medication changes  []  Hospitalization   []  Diet changes   [x]  Acute illness   [x]  Activity changes      Symptoms of hypoglycemia - Has has less issues with hypoglycemia since last visit. Has had two fasting blood sugars of 70 in last 2 weeks but overall blood sugars have been better controlled.  Continues to avoid snaking in middle of night.     []  None    [x]  Shakiness    []  Lightheadedness or dizziness   []  Confusion      []  Sweating   []  Other       Symptoms of hyperglycemia   [x]  None   []  Frequent urination    []  Increased thirst   []  Other    Medication adverse reactions   [x]  None   []  Diarrhea / Nausea / Vomiting / Constipation / flatulence   []  Hypertension   []  Peripheral edema     []  Signs of infection   []  Headache   []  Vision changes   []  Increased cholesterol    []  Weight gain   []  Change in renal function   []  Increased potassium  []  Other      Comments:  Patient takes Humulin R U500. Patient has been taking Humulin R U500 0.5ml with breakfast and lunch and 0.4 with dinner. If recent hospital admission / ED visit, was this related to Diabetes No: COPD .  Discharge date 3/28/19    Objective     PMHx:    Past Medical History:   Diagnosis Date    Acute bronchitis with chronic obstructive pulmonary disease (COPD) ([address]     Acute on chronic kidney failure ([address] 7/20/2017    Acute on chronic respiratory failure (HCC) 10/2/2018    Adhesive capsulitis of left shoulder 3/25/2017    Anxiety 10/2/2016    Arthropathy, unspecified, other specified sites 6/13/2013    Asthma     Bilateral lower leg cellulitis 12/24/2016    Bilateral lower leg cellulitis 7/5/2017    Bilateral lower leg cellulitis 2/17/2016    Blood in stool     CAD (coronary artery disease)     Cellulitis of both lower extremities 5/25/2017    Cellulitis of leg, left 7/20/2017    CHF (congestive heart failure), NYHA class III ([address] 8/14/2013    Chronic back pain     Chronic headaches     was referred to neuro, testing scheduled    Chronic kidney disease     Chronic ulcer of left leg, with fat layer exposed ([address] 2/22/2019    COPD (chronic obstructive pulmonary disease) ([address]     COPD exacerbation ([address] 11/2/2016    Diabetic neuropathy ([address] 8/14/2013    Displacement of lumbar intervertebral disc without myelopathy 6/13/2013    Ear infection     RIGHT    Facial cellulitis 2012    Fall 3/25/2017    GERD (gastroesophageal reflux disease)     Head injury     Hearing loss in right ear     pencil pierced ear as a child    Hepatic steatosis 12/3/2015    History of general anesthesia complication     has woke up during surgery under anesthesia    History of rib fracture 12/3/2015    Chronic     Hyperlipidemia     Hypertension     Insomnia     Intolerance of continuous positive airway pressure (CPAP) ventilation 7/20/2017    Mastoiditis of left side     Mixed conductive and sensorineural hearing loss of both ears 1/10/2017    Per ENT    Mixed type COPD (chronic obstructive pulmonary disease) ([address]     Moderate recurrent major depression ([address] 10/2/2016    Morbid obesity with BMI of 45.0-49.9, adult ([address] 6/16/2015    Neuropathy     Obesity     (BMI 35.0-39.9 without comorbidity)    Open wound of groin 12/19/2018    BARBY on CPAP     Osteoarthritis     Otitis externa of left ear     Pancreatitis chronic     Renal insufficiency     proteinuria    S/P cardiac cath 04/23/2018    Severe depression ([address] 9/25/2013    Spinal stenosis of lumbar region without neurogenic claudication 1/6/2016    MRI lumbar 12/30/15 L3-L4: There is broad-based bulging disc which appears protruding left laterally causing flattening of the ventral thecal sac. In addition, there is facet arthropathy with mild hypertrophic changes.  There is borderline central canal stenosis with  evidence of moderate left neural foraminal narrowing and mild right neural foramina narrowing.   L4-L5: There is broad-based protrud    Syncope 4/28/2017    Tinnitus of both ears 1/10/2017    Per ENT    Type 2 diabetes mellitus with stage 3 chronic kidney disease, with long-term current use of insulin ([address] 12/26/2016    due to underlying condition with hyperosmolarity without coma    Type II or unspecified type diabetes mellitus without mention of complication, not stated as uncontrolled     uncontrolled    Wears glasses     for reading    Wound of left leg 2/22/2019       Social History:    Social History     Tobacco Use    Smoking status: Former Smoker     Packs/day: concentrated injection vial Inject into the skin 3 times daily Indications: 50 in the morning 50 at lunch and 40 at dinner. Inject 250 units with breakfast and lunch, and inject 175 units with dinner. Yes Historical Provider, MD   nitroGLYCERIN (NITROSTAT) 0.4 MG SL tablet Place 1 tablet under the tongue every 5 minutes as needed for Chest pain 4/11/18  Yes Daja Palma MD   fluticasone (CUTIVATE) 0.05 % cream Apply topically 2 times daily  4/19/17  Yes Historical Provider, MD   fluticasone (FLONASE) 50 MCG/ACT nasal spray 2 sprays by Nasal route daily  Patient taking differently: 2 sprays by Nasal route daily as needed (sinus symptoms)  1/16/17  Yes Daja Palma MD   Melatonin 10 MG TABS Take 10 mg by mouth nightly as needed (insomnia) 12/23/16  Yes Daja Palma MD   aspirin 81 MG EC tablet Take 81 mg by mouth daily. Yes Historical Provider, MD   Lidocaine 4 % LOTN Apply topically    Historical Provider, MD   Spacer/Aero Chamber Mouthpiece MISC 1 each by Does not apply route once as needed (to be used with his inhalers) 4/15/19 4/15/19  Daja Palma MD   blood glucose test strips (ONE TOUCH ULTRA TEST) strip USE ONE STRIP TO TEST THREE TIMES A DAY 4/4/19   Daja Palma MD   Handicap Placard MISC by Does not apply route Can't walk greater than 200 feet. Expires in 5 years. 2/13/19   Daja Palma MD   COMFORT ASSIST INSULIN SYRINGE 31G X 5/16\" 1 ML MISC  7/13/16   Historical Provider, MD       Immunizations:   Most Recent Immunizations   Administered Date(s) Administered    Hepatitis B Adult (Engerix-B) 05/29/2019    Influenza Virus Vaccine 10/12/2015    Influenza, [place], 3 yrs and older, IM, PF (Fluzone 3 yrs and older or Afluria 5 yrs and older) 09/18/2018    Pneumococcal Polysaccharide ([id number]) 05/23/2013    Tdap (Boostrix, Adacel) 09/12/2018       Home Blood Glucose Results:   Patient has blood glucose log with him today.   Typical breakfast between 6:30/7am running  since being on current insulin dose. Lunch between 12-3pm running . Did have one episode of 211 but admits to eating poorly around sisters passing/.   [facility] 112-150. Patient to continue monitoring blood sugar and noting level as well as how much insulin he takes each day. Blood glucose trends noted: see above    Assessment     A1c at goal: No but improvin.2 (5/3/19)  Blood Pressure at goal: No: 136/64 on 19  Weight at goal: No: 402.9lbs  Physical activity: 10 minutes most days of the week  Physical activity at goal: No:  Patient has been too busy to exercise and currenty SOB. Patient plans to resume exercising 5 min twice a day on stationary bike. Smoking Cessation: Yes: Currently taking Chantix and has only had 3 cigarettes in last 6 weeks. Motivated to remain abstinent. Patient counseled to continue Chantix for full 12 weeks of treatment. Cholesterol at target: No: LDL 71, HDL28, TRIG 312 (18)  Annual eye exam completed: Yes  Comprehensive Foot Exam Completed: Yes  Annual urine albumin and serum creatinine: Yes    Statin: Yes    Appropriate?: Yes  Changes made:     ACE/ARB: Yes  Appropriate?: Yes  Changes made:     Aspirin: Yes  Appropriate?: Yes  Changes made:     Eating patterns:    [x]  My plate    []  Mediterranean diet   []  Low sodium   []  DASH diet   [x]  Portion control   [x]  Reduced calorie    []  Fast food / Restaurant  []  High glycemic index foods   []  Sugary beverages   []  Other     Comment: Patient states he is eating significantly less and using smaller plates. Feels satisfied with smaller quantities.      Current Medications Affecting Diabetes:  Humulin R 500    Compliant: yes  Barriers to medication therapy: none    Medications attempted in the past:  Metformin - cardiologist took him off but patient unsure why  Januvia - PCP took him off but patient unsure why    Recent exacerbations / new problems:  Continued elevated but improving A1C    Last office dictation reviewed: yes        type 2 DM under improving control as evidenced by A1C 8.2 on 4/15/19    Plan       Counseling at today's visit:   -Continued monitoring of blood glucose with documentation on log  -Continued Chantix.   -Resume exercise with increase as comfortable. .  -Continued dose of insulin:0.5ml with breakfast and lunch and 0.4mg with dinner.  -Contacted PCP who states she sent order in for new nebulizer in October 2018. Office will follow up  -PCP to start patient on doxycycline 100mg twice a day  -Spriva handihaler switched to Respimat  -New RX sent for lancet device.        Physician Follow-up:  Every 3 months    Medication Management Follow-up:   Diabetes Service   3 weeks    Electronically signed by Chantale Fernandez RPH on 6/4/2019 at 8:29 AM

## 2019-06-04 NOTE — PROGRESS NOTES
[facility]  [facility] [place]  [address]  [address]  [address] [facility] [address]  Dept: [phone]  Dept Fax: [phone] [facility] Office Note - New patient    Patient:  Helen Nesbitt. YOB: 1965  Date: 6/4/2019    The patient is a 46 y.o. male who presents todayfor evaluation of the following problems:   Chief Complaint   Patient presents with    Groin Swelling     NP - bilateral hydroceles; pt denies pain but states it is uncomfortable     referred by Trisha Clifford MD.      HPI  Here as New Pt for hydrocele noted on US. He is not typically experiencing pain, has some soreness. It has not changed ini the last 4 years. He does not feel like the hydroceles cause daily bother. He has sleep apnea- is up and down all night. Has frequency and urgency he relates to Bumex, after water pill wears off he is better. Has lower leg edema. He is having no erections for at least 4-5 years. He has recently got his DM under control. He has Hx of cardiac stent. (Patient's old records have been requested, reviewed and summarized in today's note.)    Summary of old records: N/A    Additional History: N/A    Procedures Today: N/A    Last several PSA's:  Lab Results   Component Value Date    PSA 0.72 10/13/2015     Last total testosterone:  No results found for: TESTOSTERONE  Urinalysis today:  No results found for this visit on 06/04/19.     AUA Symptom Score (6/4/2019):  INCOMPLETE EMPTYING: How often have you had the sensation of not emptying your bladder?: Not at all  FREQUENCY: How often do you have to urinate less than every two hours?: Less than Half the time  INTERMITTENCY: How often have you found you stopped and started again several times when you urinated?: Not at all  URGENCY: How often have you found it difficult to postpone urination?: Less than Half the time  WEAK STREAM: How often have you had a weak urinary stream?: Not at all  STRAINING: in right ear     pencil pierced ear as a child    Hepatic steatosis 12/3/2015    History of general anesthesia complication     has woke up during surgery under anesthesia    History of rib fracture 12/3/2015    Chronic     Hyperlipidemia     Hypertension     Insomnia     Intolerance of continuous positive airway pressure (CPAP) ventilation 7/20/2017    Mastoiditis of left side     Mixed conductive and sensorineural hearing loss of both ears 1/10/2017    Per ENT    Mixed type COPD (chronic obstructive pulmonary disease) ([address]     Moderate recurrent major depression ([address] 10/2/2016    Morbid obesity with BMI of 45.0-49.9, adult ([address] 6/16/2015    Neuropathy     Obesity     (BMI 35.0-39.9 without comorbidity)    Open wound of groin 12/19/2018    BARBY on CPAP     Osteoarthritis     Otitis externa of left ear     Pancreatitis chronic     Renal insufficiency     proteinuria    S/P cardiac cath 04/23/2018    Severe depression ([address] 9/25/2013    Spinal stenosis of lumbar region without neurogenic claudication 1/6/2016    MRI lumbar 12/30/15 L3-L4: There is broad-based bulging disc which appears protruding left laterally causing flattening of the ventral thecal sac. In addition, there is facet arthropathy with mild hypertrophic changes.  There is borderline central canal stenosis with  evidence of moderate left neural foraminal narrowing and mild right neural foramina narrowing.   L4-L5: There is broad-based protrud    Syncope 4/28/2017    Tinnitus of both ears 1/10/2017    Per ENT    Type 2 diabetes mellitus with stage 3 chronic kidney disease, with long-term current use of insulin ([address] 12/26/2016    due to underlying condition with hyperosmolarity without coma    Type II or unspecified type diabetes mellitus without mention of complication, not stated as uncontrolled     uncontrolled    Wears glasses     for reading    Wound of left leg 2/22/2019     Past Surgical History:   Procedure Laterality Date    BACK SURGERY   (x 4) ,.2011.2012     Dr Maeve Fuller last 2 Jaiden Noni  2018    no stenting    COLONOSCOPY  11/3/2015    hemorrhoids, poor prep    CORONARY ANGIOPLASTY WITH STENT PLACEMENT  March 2013    x 1    HAND TENDON SURGERY Left     thumb tendon repair    KNEE ARTHROSCOPY Left     NERVE BLOCK  07-31-15    TENS unit    CT ESOPHAGOGASTRODUODENOSCOPY TRANSORAL DIAGNOSTIC N/A 2018    EGD ESOPHAGOGASTRODUODENOSCOPY performed by Claudia Schaefer MD at [address] [facility] TYMPANOMASTOIDECTOMY Bilateral 2012    Dr Franklin  2013    normal     Family History   Problem Relation Age of Onset    Heart Disease Mother          age 64 from MI   [place] High Blood Pressure Mother     Diabetes Mother     High Blood Pressure Father          age 80 from CKD and Lung Fibrosis    Kidney Disease Father     Heart Disease Sister     Heart Attack Sister     Obesity Sister     Diabetes Sister      Outpatient Medications Marked as Taking for the 19 encounter (Office Visit) with Handy Chen MD   Medication Sig Dispense Refill    tiotropium (SPIRIVA RESPIMAT) 2.5 MCG/ACT AERS inhaler Inhale 2 puffs into the lungs daily 4 g 3    albuterol (PROVENTIL) (2.5 MG/3ML) 0.083% nebulizer solution Take 3 mLs by nebulization every 6 hours as needed for Wheezing or Shortness of Breath 120 vial 0    oxyCODONE HCl (OXY-IR) 10 MG immediate release tablet Take 1 tablet by mouth every 8 hours as needed for Pain for up to 30 days.  90 tablet 0    pravastatin (PRAVACHOL) 40 MG tablet Take 1 tablet by mouth nightly 90 tablet 3    Lactobacillus (PROBIOTIC ACIDOPHILUS) TABS Take 1 tablet by mouth 2 times daily 60 tablet 0    nystatin (MYCOSTATIN) 702254 UNIT/GM powder Apply 2- 3 times daily skin folds 60 g 3    varenicline (CHANTIX) 1 MG tablet Take 1 tablet by mouth 2 times daily 60 tablet 1    Lidocaine 4 % LOTN Apply topically      Spacer/Aero Chamber Mouthpiece MISC 1 each by Does not apply route once as needed (to be used with his inhalers) 1 each 0    blood glucose test strips (ONE TOUCH ULTRA TEST) strip USE ONE STRIP TO TEST THREE TIMES A  strip 2    metolazone (ZAROXOLYN) 2.5 MG tablet Take 2.5 mg by mouth Twice a Week      PROAIR  (90 Base) MCG/ACT inhaler INHALE TWO PUFFS BY MOUTH EVERY 6 HOURS AS NEEDED FOR WHEEZING OR SHORTNESS OF BREATH 1 Inhaler 4    spironolactone (ALDACTONE) 50 MG tablet Take 1 tablet by mouth daily 90 tablet 3    vitamin D (CHOLECALCIFEROL) 1000 UNIT TABS tablet Take 1 tablet by mouth daily 90 tablet 3    docusate sodium (STOOL SOFTENER) 100 MG capsule Take 1 capsule by mouth 2 times daily 180 capsule 3    venlafaxine (EFFEXOR) 100 MG tablet Take 1 tablet by mouth 2 times daily 60 tablet 3    Handicap Placard MISC by Does not apply route Can't walk greater than 200 feet. Expires in 5 years. 1 each 0    tiZANidine (ZANAFLEX) 4 MG tablet TAKE ONE TABLET BY MOUTH EVERY 8 HOURS AS NEEDED FOR BACK PAIN 90 tablet 5    pantoprazole (PROTONIX) 40 MG tablet Take 1 tablet by mouth nightly 90 tablet 3    miconazole (MICOTIN) 2 % powder Apply topically 2 times daily. 45 g 1    Ferrous Sulfate (IRON) 325 (65 Fe) MG TABS Take 1 tablet by mouth daily 90 tablet 3    metoprolol tartrate (LOPRESSOR) 50 MG tablet Take 1 tablet by mouth 2 times daily 180 tablet 3    lisinopril (PRINIVIL;ZESTRIL) 5 MG tablet Take 1 tablet by mouth daily . Discontinued Lisinopril  10 mg 90 tablet 3    clopidogrel (PLAVIX) 75 MG tablet Take 1 tablet by mouth daily 90 tablet 2    bumetanide (BUMEX) 1 MG tablet Take 3 tablets by mouth 2 times daily 180 tablet 1    fluticasone-salmeterol (ADVAIR HFA) 230-21 MCG/ACT inhaler Inhale 2 puffs into the lungs 2 times daily 12 g 5    insulin regular human (HUMULIN R) 500 UNIT/ML concentrated injection vial Inject into the skin 3 times daily Indications: 50 in normal  Testicles normal bilaterally  Epididymis normal bilaterally      Assessment and Plan      1. Encysted hydrocele           Plan:        Has bilateral hydroceles, right greater than left. Do not cause enough bother for surgery. Needs PSA in December. F/U as needed. Prescriptions Ordered:  No orders of the defined types were placed in this encounter. Orders Placed:  No orders of the defined types were placed in this encounter. Gina Nielsen MD    Agree with the ROS entered by the MA.

## 2019-06-04 NOTE — TELEPHONE ENCOUNTER
I placed a new order in his last office visit  Note completed, please fax order for nebulizer& I'll leave it int he box  Please let us know where is being faxed.

## 2019-06-12 ENCOUNTER — HOSPITAL ENCOUNTER (INPATIENT)
Age: 54
LOS: 2 days | Discharge: HOME | DRG: 603 | End: 2019-06-14
Attending: EMERGENCY MEDICINE | Admitting: INTERNAL MEDICINE
Payer: COMMERCIAL

## 2019-06-12 ENCOUNTER — OFFICE VISIT (OUTPATIENT)
Dept: FAMILY MEDICINE CLINIC | Age: 54
End: 2019-06-12
Payer: COMMERCIAL

## 2019-06-12 VITALS
OXYGEN SATURATION: 96 % | SYSTOLIC BLOOD PRESSURE: 135 MMHG | TEMPERATURE: 97.7 F | WEIGHT: 315 LBS | HEART RATE: 99 BPM | HEIGHT: 72 IN | BODY MASS INDEX: 42.66 KG/M2 | DIASTOLIC BLOOD PRESSURE: 63 MMHG

## 2019-06-12 DIAGNOSIS — L03.112 CELLULITIS OF LEFT AXILLA: Primary | ICD-10-CM

## 2019-06-12 DIAGNOSIS — R50.9 FEVER, UNSPECIFIED FEVER CAUSE: ICD-10-CM

## 2019-06-12 DIAGNOSIS — L03.112 CELLULITIS OF AXILLA, LEFT: ICD-10-CM

## 2019-06-12 PROBLEM — L03.90 CELLULITIS: Status: ACTIVE | Noted: 2019-06-12

## 2019-06-12 LAB
ABSOLUTE EOS #: 0.1 K/UL (ref 0–0.4)
ABSOLUTE IMMATURE GRANULOCYTE: ABNORMAL K/UL (ref 0–0.3)
ABSOLUTE LYMPH #: 1.1 K/UL (ref 1–4.8)
ABSOLUTE MONO #: 0.7 K/UL (ref 0.1–1.3)
ANION GAP SERPL CALCULATED.3IONS-SCNC: 14 MMOL/L (ref 9–17)
BASOPHILS # BLD: 1 % (ref 0–2)
BASOPHILS ABSOLUTE: 0 K/UL (ref 0–0.2)
BUN BLDV-MCNC: 38 MG/DL (ref 6–20)
BUN/CREAT BLD: ABNORMAL (ref 9–20)
CALCIUM SERPL-MCNC: 9.5 MG/DL (ref 8.6–10.4)
CHLORIDE BLD-SCNC: 100 MMOL/L (ref 98–107)
CO2: 24 MMOL/L (ref 20–31)
CREAT SERPL-MCNC: 1.67 MG/DL (ref 0.7–1.2)
DIFFERENTIAL TYPE: ABNORMAL
EOSINOPHILS RELATIVE PERCENT: 2 % (ref 0–4)
GFR AFRICAN AMERICAN: 52 ML/MIN
GFR NON-AFRICAN AMERICAN: 43 ML/MIN
GFR SERPL CREATININE-BSD FRML MDRD: ABNORMAL ML/MIN/{1.73_M2}
GFR SERPL CREATININE-BSD FRML MDRD: ABNORMAL ML/MIN/{1.73_M2}
GLUCOSE BLD-MCNC: 138 MG/DL (ref 75–110)
GLUCOSE BLD-MCNC: 313 MG/DL (ref 75–110)
GLUCOSE BLD-MCNC: 75 MG/DL (ref 70–99)
HCT VFR BLD CALC: 36.2 % (ref 41–53)
HEMOGLOBIN: 11.8 G/DL (ref 13.5–17.5)
IMMATURE GRANULOCYTES: ABNORMAL %
LYMPHOCYTES # BLD: 11 % (ref 24–44)
MCH RBC QN AUTO: 27.9 PG (ref 26–34)
MCHC RBC AUTO-ENTMCNC: 32.6 G/DL (ref 31–37)
MCV RBC AUTO: 85.8 FL (ref 80–100)
MONOCYTES # BLD: 7 % (ref 1–7)
NRBC AUTOMATED: ABNORMAL PER 100 WBC
PDW BLD-RTO: 16.6 % (ref 11.5–14.9)
PLATELET # BLD: 197 K/UL (ref 150–450)
PLATELET ESTIMATE: ABNORMAL
PMV BLD AUTO: 9 FL (ref 6–12)
POTASSIUM SERPL-SCNC: 4.3 MMOL/L (ref 3.7–5.3)
RBC # BLD: 4.22 M/UL (ref 4.5–5.9)
RBC # BLD: ABNORMAL 10*6/UL
SEG NEUTROPHILS: 79 % (ref 36–66)
SEGMENTED NEUTROPHILS ABSOLUTE COUNT: 7.8 K/UL (ref 1.3–9.1)
SODIUM BLD-SCNC: 138 MMOL/L (ref 135–144)
WBC # BLD: 9.8 K/UL (ref 3.5–11)
WBC # BLD: ABNORMAL 10*3/UL

## 2019-06-12 PROCEDURE — 82947 ASSAY GLUCOSE BLOOD QUANT: CPT

## 2019-06-12 PROCEDURE — 2500000003 HC RX 250 WO HCPCS: Performed by: INTERNAL MEDICINE

## 2019-06-12 PROCEDURE — 6360000002 HC RX W HCPCS: Performed by: STUDENT IN AN ORGANIZED HEALTH CARE EDUCATION/TRAINING PROGRAM

## 2019-06-12 PROCEDURE — 94760 N-INVAS EAR/PLS OXIMETRY 1: CPT

## 2019-06-12 PROCEDURE — 6370000000 HC RX 637 (ALT 250 FOR IP): Performed by: STUDENT IN AN ORGANIZED HEALTH CARE EDUCATION/TRAINING PROGRAM

## 2019-06-12 PROCEDURE — 3017F COLORECTAL CA SCREEN DOC REV: CPT | Performed by: NURSE PRACTITIONER

## 2019-06-12 PROCEDURE — 96365 THER/PROPH/DIAG IV INF INIT: CPT

## 2019-06-12 PROCEDURE — 2700000000 HC OXYGEN THERAPY PER DAY

## 2019-06-12 PROCEDURE — 2580000003 HC RX 258: Performed by: INTERNAL MEDICINE

## 2019-06-12 PROCEDURE — 1200000000 HC SEMI PRIVATE

## 2019-06-12 PROCEDURE — 2500000003 HC RX 250 WO HCPCS: Performed by: STUDENT IN AN ORGANIZED HEALTH CARE EDUCATION/TRAINING PROGRAM

## 2019-06-12 PROCEDURE — G8427 DOCREV CUR MEDS BY ELIG CLIN: HCPCS | Performed by: NURSE PRACTITIONER

## 2019-06-12 PROCEDURE — 99223 1ST HOSP IP/OBS HIGH 75: CPT | Performed by: INTERNAL MEDICINE

## 2019-06-12 PROCEDURE — G8417 CALC BMI ABV UP PARAM F/U: HCPCS | Performed by: NURSE PRACTITIONER

## 2019-06-12 PROCEDURE — G8598 ASA/ANTIPLAT THER USED: HCPCS | Performed by: NURSE PRACTITIONER

## 2019-06-12 PROCEDURE — 36415 COLL VENOUS BLD VENIPUNCTURE: CPT

## 2019-06-12 PROCEDURE — 6370000000 HC RX 637 (ALT 250 FOR IP): Performed by: EMERGENCY MEDICINE

## 2019-06-12 PROCEDURE — 2580000003 HC RX 258: Performed by: EMERGENCY MEDICINE

## 2019-06-12 PROCEDURE — 6360000002 HC RX W HCPCS: Performed by: EMERGENCY MEDICINE

## 2019-06-12 PROCEDURE — 96375 TX/PRO/DX INJ NEW DRUG ADDON: CPT

## 2019-06-12 PROCEDURE — 99284 EMERGENCY DEPT VISIT MOD MDM: CPT

## 2019-06-12 PROCEDURE — 85025 COMPLETE CBC W/AUTO DIFF WBC: CPT

## 2019-06-12 PROCEDURE — 99214 OFFICE O/P EST MOD 30 MIN: CPT | Performed by: NURSE PRACTITIONER

## 2019-06-12 PROCEDURE — 1036F TOBACCO NON-USER: CPT | Performed by: NURSE PRACTITIONER

## 2019-06-12 PROCEDURE — 94640 AIRWAY INHALATION TREATMENT: CPT

## 2019-06-12 PROCEDURE — 80048 BASIC METABOLIC PNL TOTAL CA: CPT

## 2019-06-12 RX ORDER — LISINOPRIL 5 MG/1
5 TABLET ORAL DAILY
Status: DISCONTINUED | OUTPATIENT
Start: 2019-06-12 | End: 2019-06-14 | Stop reason: HOSPADM

## 2019-06-12 RX ORDER — INSULIN GLARGINE 100 [IU]/ML
4 INJECTION, SOLUTION SUBCUTANEOUS NIGHTLY
Status: DISCONTINUED | OUTPATIENT
Start: 2019-06-12 | End: 2019-06-13

## 2019-06-12 RX ORDER — METHYLPREDNISOLONE SODIUM SUCCINATE 125 MG/2ML
125 INJECTION, POWDER, LYOPHILIZED, FOR SOLUTION INTRAMUSCULAR; INTRAVENOUS ONCE
Status: COMPLETED | OUTPATIENT
Start: 2019-06-12 | End: 2019-06-12

## 2019-06-12 RX ORDER — HEPARIN SODIUM 5000 [USP'U]/ML
5000 INJECTION, SOLUTION INTRAVENOUS; SUBCUTANEOUS EVERY 8 HOURS SCHEDULED
Status: DISCONTINUED | OUTPATIENT
Start: 2019-06-12 | End: 2019-06-14 | Stop reason: HOSPADM

## 2019-06-12 RX ORDER — DEXTROSE MONOHYDRATE 25 G/50ML
12.5 INJECTION, SOLUTION INTRAVENOUS PRN
Status: DISCONTINUED | OUTPATIENT
Start: 2019-06-12 | End: 2019-06-14 | Stop reason: HOSPADM

## 2019-06-12 RX ORDER — DIPHENHYDRAMINE HYDROCHLORIDE 50 MG/ML
25 INJECTION INTRAMUSCULAR; INTRAVENOUS ONCE
Status: COMPLETED | OUTPATIENT
Start: 2019-06-12 | End: 2019-06-12

## 2019-06-12 RX ORDER — IPRATROPIUM BROMIDE AND ALBUTEROL SULFATE 2.5; .5 MG/3ML; MG/3ML
1 SOLUTION RESPIRATORY (INHALATION) ONCE
Status: COMPLETED | OUTPATIENT
Start: 2019-06-12 | End: 2019-06-12

## 2019-06-12 RX ORDER — PRAVASTATIN SODIUM 40 MG
40 TABLET ORAL NIGHTLY
Status: DISCONTINUED | OUTPATIENT
Start: 2019-06-12 | End: 2019-06-14 | Stop reason: HOSPADM

## 2019-06-12 RX ORDER — GREEN TEA/HOODIA GORDONII 315-12.5MG
1 CAPSULE ORAL 2 TIMES DAILY
Status: DISCONTINUED | OUTPATIENT
Start: 2019-06-12 | End: 2019-06-12 | Stop reason: CLARIF

## 2019-06-12 RX ORDER — NICOTINE POLACRILEX 4 MG
15 LOZENGE BUCCAL PRN
Status: DISCONTINUED | OUTPATIENT
Start: 2019-06-12 | End: 2019-06-14 | Stop reason: HOSPADM

## 2019-06-12 RX ORDER — FERROUS SULFATE 325(65) MG
325 TABLET ORAL DAILY
Status: DISCONTINUED | OUTPATIENT
Start: 2019-06-12 | End: 2019-06-14 | Stop reason: HOSPADM

## 2019-06-12 RX ORDER — ALBUTEROL SULFATE 2.5 MG/3ML
2.5 SOLUTION RESPIRATORY (INHALATION) EVERY 6 HOURS PRN
Status: DISCONTINUED | OUTPATIENT
Start: 2019-06-12 | End: 2019-06-14 | Stop reason: HOSPADM

## 2019-06-12 RX ORDER — METOPROLOL TARTRATE 50 MG/1
50 TABLET, FILM COATED ORAL 2 TIMES DAILY
Status: DISCONTINUED | OUTPATIENT
Start: 2019-06-12 | End: 2019-06-14 | Stop reason: HOSPADM

## 2019-06-12 RX ORDER — SPIRONOLACTONE 25 MG/1
50 TABLET ORAL DAILY
Status: DISCONTINUED | OUTPATIENT
Start: 2019-06-12 | End: 2019-06-14 | Stop reason: HOSPADM

## 2019-06-12 RX ORDER — ASPIRIN 81 MG/1
81 TABLET ORAL DAILY
Status: DISCONTINUED | OUTPATIENT
Start: 2019-06-12 | End: 2019-06-14 | Stop reason: HOSPADM

## 2019-06-12 RX ORDER — NITROGLYCERIN 0.4 MG/1
0.4 TABLET SUBLINGUAL EVERY 5 MIN PRN
Status: DISCONTINUED | OUTPATIENT
Start: 2019-06-12 | End: 2019-06-14 | Stop reason: HOSPADM

## 2019-06-12 RX ORDER — PANTOPRAZOLE SODIUM 40 MG/1
40 TABLET, DELAYED RELEASE ORAL NIGHTLY
Status: DISCONTINUED | OUTPATIENT
Start: 2019-06-12 | End: 2019-06-14 | Stop reason: HOSPADM

## 2019-06-12 RX ORDER — VARENICLINE TARTRATE 1 MG/1
1 TABLET, FILM COATED ORAL 2 TIMES DAILY
Status: DISCONTINUED | OUTPATIENT
Start: 2019-06-12 | End: 2019-06-14 | Stop reason: HOSPADM

## 2019-06-12 RX ORDER — DEXTROSE MONOHYDRATE 50 MG/ML
100 INJECTION, SOLUTION INTRAVENOUS PRN
Status: DISCONTINUED | OUTPATIENT
Start: 2019-06-12 | End: 2019-06-14 | Stop reason: HOSPADM

## 2019-06-12 RX ORDER — LACTOBACILLUS RHAMNOSUS GG 10B CELL
1 CAPSULE ORAL 2 TIMES DAILY WITH MEALS
Status: DISCONTINUED | OUTPATIENT
Start: 2019-06-12 | End: 2019-06-14 | Stop reason: HOSPADM

## 2019-06-12 RX ORDER — SODIUM CHLORIDE 0.9 % (FLUSH) 0.9 %
10 SYRINGE (ML) INJECTION EVERY 12 HOURS SCHEDULED
Status: DISCONTINUED | OUTPATIENT
Start: 2019-06-12 | End: 2019-06-14 | Stop reason: HOSPADM

## 2019-06-12 RX ORDER — CLOPIDOGREL BISULFATE 75 MG/1
75 TABLET ORAL DAILY
Status: DISCONTINUED | OUTPATIENT
Start: 2019-06-12 | End: 2019-06-14 | Stop reason: HOSPADM

## 2019-06-12 RX ORDER — ONDANSETRON 2 MG/ML
4 INJECTION INTRAMUSCULAR; INTRAVENOUS ONCE
Status: COMPLETED | OUTPATIENT
Start: 2019-06-12 | End: 2019-06-12

## 2019-06-12 RX ORDER — BUMETANIDE 1 MG/1
3 TABLET ORAL 2 TIMES DAILY
Status: DISCONTINUED | OUTPATIENT
Start: 2019-06-12 | End: 2019-06-14 | Stop reason: HOSPADM

## 2019-06-12 RX ORDER — SODIUM CHLORIDE 0.9 % (FLUSH) 0.9 %
10 SYRINGE (ML) INJECTION PRN
Status: DISCONTINUED | OUTPATIENT
Start: 2019-06-12 | End: 2019-06-14 | Stop reason: HOSPADM

## 2019-06-12 RX ORDER — ACETAMINOPHEN 325 MG/1
650 TABLET ORAL EVERY 4 HOURS PRN
Status: DISCONTINUED | OUTPATIENT
Start: 2019-06-12 | End: 2019-06-14 | Stop reason: HOSPADM

## 2019-06-12 RX ORDER — METOLAZONE 2.5 MG/1
2.5 TABLET ORAL
Status: DISCONTINUED | OUTPATIENT
Start: 2019-06-13 | End: 2019-06-14 | Stop reason: HOSPADM

## 2019-06-12 RX ORDER — OXYCODONE HYDROCHLORIDE 10 MG/1
10 TABLET ORAL EVERY 8 HOURS PRN
Status: DISCONTINUED | OUTPATIENT
Start: 2019-06-12 | End: 2019-06-14 | Stop reason: HOSPADM

## 2019-06-12 RX ADMIN — INSULIN GLARGINE 4 UNITS: 100 INJECTION, SOLUTION SUBCUTANEOUS at 20:50

## 2019-06-12 RX ADMIN — BUMETANIDE 3 MG: 1 TABLET ORAL at 17:40

## 2019-06-12 RX ADMIN — DOXYCYCLINE 100 MG: 100 INJECTION, POWDER, LYOPHILIZED, FOR SOLUTION INTRAVENOUS at 15:56

## 2019-06-12 RX ADMIN — METOPROLOL TARTRATE 50 MG: 50 TABLET ORAL at 20:49

## 2019-06-12 RX ADMIN — MICONAZOLE NITRATE: 20.6 POWDER TOPICAL at 21:04

## 2019-06-12 RX ADMIN — ONDANSETRON HYDROCHLORIDE 4 MG: 2 INJECTION, SOLUTION INTRAMUSCULAR; INTRAVENOUS at 12:04

## 2019-06-12 RX ADMIN — VARENICLINE TARTRATE 1 MG: 1 TABLET, FILM COATED ORAL at 20:49

## 2019-06-12 RX ADMIN — DIPHENHYDRAMINE HYDROCHLORIDE 25 MG: 50 INJECTION, SOLUTION INTRAMUSCULAR; INTRAVENOUS at 12:23

## 2019-06-12 RX ADMIN — Medication 1 CAPSULE: at 17:53

## 2019-06-12 RX ADMIN — PANTOPRAZOLE SODIUM 40 MG: 40 TABLET, DELAYED RELEASE ORAL at 20:49

## 2019-06-12 RX ADMIN — VANCOMYCIN HYDROCHLORIDE 1500 MG: 5 INJECTION, POWDER, LYOPHILIZED, FOR SOLUTION INTRAVENOUS at 11:59

## 2019-06-12 RX ADMIN — OXYCODONE HYDROCHLORIDE 10 MG: 10 TABLET ORAL at 17:40

## 2019-06-12 RX ADMIN — IPRATROPIUM BROMIDE AND ALBUTEROL SULFATE 1 AMPULE: .5; 3 SOLUTION RESPIRATORY (INHALATION) at 12:25

## 2019-06-12 RX ADMIN — HEPARIN SODIUM 5000 UNITS: 5000 INJECTION INTRAVENOUS; SUBCUTANEOUS at 22:34

## 2019-06-12 RX ADMIN — ALBUTEROL SULFATE 2.5 MG: 2.5 SOLUTION RESPIRATORY (INHALATION) at 20:05

## 2019-06-12 RX ADMIN — INSULIN LISPRO 4 UNITS: 100 INJECTION, SOLUTION INTRAVENOUS; SUBCUTANEOUS at 20:50

## 2019-06-12 RX ADMIN — CLINDAMYCIN PHOSPHATE 600 MG: 150 INJECTION, SOLUTION, CONCENTRATE INTRAVENOUS at 17:40

## 2019-06-12 RX ADMIN — METHYLPREDNISOLONE SODIUM SUCCINATE 125 MG: 125 INJECTION, POWDER, FOR SOLUTION INTRAMUSCULAR; INTRAVENOUS at 12:23

## 2019-06-12 RX ADMIN — PRAVASTATIN SODIUM 40 MG: 40 TABLET ORAL at 20:49

## 2019-06-12 RX ADMIN — MOMETASONE FUROATE AND FORMOTEROL FUMARATE DIHYDRATE 2 PUFF: 200; 5 AEROSOL RESPIRATORY (INHALATION) at 20:50

## 2019-06-12 ASSESSMENT — ENCOUNTER SYMPTOMS
VOMITING: 0
BLOOD IN STOOL: 0
COLOR CHANGE: 0
EYE REDNESS: 0
FACIAL SWELLING: 0
RHINORRHEA: 0
EYE PAIN: 0
SHORTNESS OF BREATH: 0
COUGH: 0
COLOR CHANGE: 1
SINUS PRESSURE: 0
NAUSEA: 0
BACK PAIN: 0
WHEEZING: 0
WHEEZING: 0
SHORTNESS OF BREATH: 1
TROUBLE SWALLOWING: 0
ABDOMINAL PAIN: 0
CONSTIPATION: 0
SORE THROAT: 0
SHORTNESS OF BREATH: 1
DIARRHEA: 0
ABDOMINAL PAIN: 0
COUGH: 0
EYE DISCHARGE: 0
DIARRHEA: 0
CHEST TIGHTNESS: 0
COLOR CHANGE: 1

## 2019-06-12 ASSESSMENT — PAIN SCALES - GENERAL
PAINLEVEL_OUTOF10: 8
PAINLEVEL_OUTOF10: 8
PAINLEVEL_OUTOF10: 7

## 2019-06-12 NOTE — H&P
[address]      [address] [facility]     HISTORY AND PHYSICAL EXAMINATION            Date:   6/12/2019  Patient name:  Lainey Harris. Date of admission:  6/12/2019 11:21 AM  MRN:   [id number]  Account:  [de-identified]  YOB: 1965  PCP:    Theron Castillo MD  Room:   2072/2072-01  Code Status:    Full Code    Chief Complaint:     Chief Complaint   Patient presents with    Cellulitis     R axilla       History Obtained From:     patient, electronic medical record    History of Present Illness:     Patient is a 50-year-old male presenting with L axillary infection. Patient reports constant infection in his left axilla for the past 2 weeks. States that he has been having recurrent infections over the past year. Patient has recurrent admissions for presenting symptoms. Patient was recently completed antibiotic course and failed subsequently presented to his doctor and was instructed to present to the ED for IV antibiotics. Patient had required vancomycin during previous admission patient denies any chest pain, shortness of breath, fevers or chills. Reports severe pain that is exacerbated with movement of the  L arm   The ED, CBC revealed no leukocytosis. BMP showed creatinine of 1.67, patient has history of CKD; patient baseline. Is admitted for left axillary cellulitis with failed outpatient therapy.     Past Medical History:     Past Medical History:   Diagnosis Date    Acute bronchitis with chronic obstructive pulmonary disease (COPD) ([address]     Acute on chronic kidney failure ([address] 7/20/2017    Acute on chronic respiratory failure ([address] 10/2/2018    Adhesive capsulitis of left shoulder 3/25/2017    Anxiety 10/2/2016    Arthropathy, unspecified, other specified sites 6/13/2013    Asthma     Bilateral lower leg cellulitis 12/24/2016    Bilateral lower leg cellulitis 7/5/2017    Bilateral lower leg cellulitis 2/17/2016    Blood in stool     CAD (coronary artery disease)     Cellulitis of both lower extremities 5/25/2017    Cellulitis of leg, left 7/20/2017    CHF (congestive heart failure), NYHA class III ([facility]) 8/14/2013    Chronic back pain     Chronic headaches     was referred to neuro, testing scheduled    Chronic kidney disease     Chronic ulcer of left leg, with fat layer exposed ([address] 2/22/2019    COPD (chronic obstructive pulmonary disease) ([address]     COPD exacerbation ([address] 11/2/2016    Diabetic neuropathy ([address] 8/14/2013    Displacement of lumbar intervertebral disc without myelopathy 6/13/2013    Ear infection     RIGHT    Facial cellulitis 2012    Fall 3/25/2017    GERD (gastroesophageal reflux disease)     Head injury     Hearing loss in right ear     pencil pierced ear as a child    Hepatic steatosis 12/3/2015    History of general anesthesia complication     has woke up during surgery under anesthesia    History of rib fracture 12/3/2015    Chronic     Hyperlipidemia     Hypertension     Insomnia     Intolerance of continuous positive airway pressure (CPAP) ventilation 7/20/2017    Mastoiditis of left side     Mixed conductive and sensorineural hearing loss of both ears 1/10/2017    Per ENT    Mixed type COPD (chronic obstructive pulmonary disease) ([address]     Moderate recurrent major depression ([address] 10/2/2016    Morbid obesity with BMI of 45.0-49.9, adult ([address] 6/16/2015    Neuropathy     Obesity     (BMI 35.0-39.9 without comorbidity)    Open wound of groin 12/19/2018    BARBY on CPAP     Osteoarthritis     Otitis externa of left ear     Pancreatitis chronic     Renal insufficiency     proteinuria    S/P cardiac cath 04/23/2018    Severe depression ([address] 9/25/2013    Spinal stenosis of lumbar region without neurogenic claudication 1/6/2016    MRI lumbar 12/30/15 L3-L4: There is broad-based bulging disc which appears protruding left laterally causing flattening of the ventral thecal sac. In addition, there is facet arthropathy with mild hypertrophic changes. There is borderline central canal stenosis with  evidence of moderate left neural foraminal narrowing and mild right neural foramina narrowing.   L4-L5: There is broad-based protrud    Syncope 4/28/2017    Tinnitus of both ears 1/10/2017    Per ENT    Type 2 diabetes mellitus with stage 3 chronic kidney disease, with long-term current use of insulin ([facility] 12/26/2016    due to underlying condition with hyperosmolarity without coma    Type II or unspecified type diabetes mellitus without mention of complication, not stated as uncontrolled     uncontrolled    Wears glasses     for reading    Wound of left leg 2/22/2019        Past SurgicalHistory:     Past Surgical History:   Procedure Laterality Date    BACK SURGERY   (x 4) 2000,.12/2011.2/2012     Dr Wong Leon last 2 surg    CARDIAC CATHETERIZATION  04/23/2018    no stenting    COLONOSCOPY  11/3/2015    hemorrhoids, poor prep    CORONARY ANGIOPLASTY WITH STENT PLACEMENT  March 2013    x 1    HAND TENDON SURGERY Left     thumb tendon repair    KNEE ARTHROSCOPY Left     NERVE BLOCK  07-31-15    TENS unit    WA ESOPHAGOGASTRODUODENOSCOPY TRANSORAL DIAGNOSTIC N/A 7/18/2018    EGD ESOPHAGOGASTRODUODENOSCOPY performed by Dolores Bradshaw MD at [facility] Bilateral 09/20/2012    Dr Jinnie Fleischer  4/13/2013    normal        Medications Prior to Admission:        Prior to Admission medications    Medication Sig Start Date End Date Taking?  Authorizing Provider   clindamycin (CLEOCIN) 300 MG capsule Take 1 capsule by mouth 4 times daily for 10 days 6/11/19 6/21/19  Cali Gonzalez MD   Lactobacillus (PROBIOTIC ACIDOPHILUS) TABS Take 1 tablet by mouth 2 times daily 6/11/19 7/11/19  Cali Gonzalez MD   tiotropium (SPIRIVA RESPIMAT) 2.5 MCG/ACT AERS inhaler Inhale 2 puffs into the lungs daily 6/4/19   Saul Wright MD   albuterol (PROVENTIL) (2.5 MG/3ML) 0.083% nebulizer solution Take 3 mLs by nebulization every 6 hours as needed for Wheezing or Shortness of Breath 6/4/19   Saul Wright MD   Lancet Devices (LANCING DEVICE) MISC Provide patient with lancing device appropriate for his machine/lancing needles. 6/4/19   Saul Wright MD   oxyCODONE HCl (OXY-IR) 10 MG immediate release tablet Take 1 tablet by mouth every 8 hours as needed for Pain for up to 30 days.  5/29/19 6/28/19  Saul Wright MD   pravastatin (PRAVACHOL) 40 MG tablet Take 1 tablet by mouth nightly 5/22/19   Saul Wright MD   nystatin (MYCOSTATIN) 246591 UNIT/GM powder Apply 2- 3 times daily skin folds 5/9/19   Saul Wright MD   varenicline (CHANTIX) 1 MG tablet Take 1 tablet by mouth 2 times daily 5/2/19   Saul Wright MD   Lidocaine 4 % LOTN Apply topically    Historical Provider, MD   Spacer/Aero Chamber Mouthpiece MISC 1 each by Does not apply route once as needed (to be used with his inhalers) 4/15/19 6/4/19  Saul Wright MD   blood glucose test strips (ONE TOUCH ULTRA TEST) strip USE ONE STRIP TO TEST THREE TIMES A DAY 4/4/19   Saul Wright MD   metolazone (ZAROXOLYN) 2.5 MG tablet Take 2.5 mg by mouth Twice a Week    Historical Provider, MD   PROAIR  (90 Base) MCG/ACT inhaler INHALE TWO PUFFS BY MOUTH EVERY 6 HOURS AS NEEDED FOR WHEEZING OR SHORTNESS OF BREATH 3/27/19   Gwen Sanchez MD   spironolactone (ALDACTONE) 50 MG tablet Take 1 tablet by mouth daily 3/18/19   Saul Wright MD   vitamin D (CHOLECALCIFEROL) 1000 UNIT TABS tablet Take 1 tablet by mouth daily 2/25/19   Saul Wright MD   docusate sodium (STOOL SOFTENER) 100 MG capsule Take 1 capsule by mouth 2 times daily 2/25/19   Saul Wright MD   venlafaxine (EFFEXOR) 100 MG tablet Take 1 tablet by mouth 2 times daily 2/18/19   Paige mouth nightly as needed (insomnia) 16   Vic Rasmussen MD   COMFORT ASSIST INSULIN SYRINGE 31G X \" 1 ML MISC  16   Historical Provider, MD   aspirin 81 MG EC tablet Take 81 mg by mouth daily. Historical Provider, MD        Allergies:     Lorazepam; Nsaids; Vancomycin; and Levofloxacin    Social History:     Tobacco:    reports that he quit smoking about 7 weeks ago. His smoking use included cigarettes. He started smoking about 33 years ago. He has a 66.00 pack-year smoking history. He quit smokeless tobacco use about 23 years ago. His smokeless tobacco use included snuff. Alcohol:      reports that he does not drink alcohol. Drug Use:  reports that he has current or past drug history. Drug: Marijuana. Family History:     Family History   Problem Relation Age of Onset    Heart Disease Mother          age 64 from MI   Mateo Prashanth High Blood Pressure Mother     Diabetes Mother     High Blood Pressure Father          age 80 from CKD and Lung Fibrosis    Kidney Disease Father     Heart Disease Sister     Heart Attack Sister     Obesity Sister     Diabetes Sister        Review of Systems:     Positive and Negative as described in HPI. Review of Systems   Constitutional: Positive for chills, fatigue and fever. Respiratory: Positive for shortness of breath. Negative for cough and wheezing. Cardiovascular: Negative for chest pain and palpitations. Gastrointestinal: Negative for abdominal pain and diarrhea. Musculoskeletal: Positive for arthralgias and gait problem. Skin: Positive for color change and rash. Neurological: Negative for dizziness. Psychiatric/Behavioral: The patient is not nervous/anxious.         Physical Exam:   BP (!) 151/58   Pulse 92   Temp 98 °F (36.7 °C) (Oral)   Resp 18   Ht 6' (1.829 m)   Wt (!) 407 lb (184.6 kg)   SpO2 100%   BMI 55.20 kg/m²   Temp (24hrs), Av °F (36.7 °C), Min:97.7 °F (36.5 °C), Max:98.1 °F (36.7 °C)    Recent Labs 06/12/19  1600   POCGLU 138*     No intake or output data in the 24 hours ending 06/12/19 1642    Physical Exam   Constitutional: He is oriented to person, place, and time. He appears well-developed and well-nourished. No distress. Cardiovascular: Normal rate and regular rhythm. No murmur heard. Pulmonary/Chest: Effort normal. He has decreased breath sounds. He has no wheezes. Abdominal: Soft. Bowel sounds are normal. There is no tenderness. Musculoskeletal:   Walks with cane   Neurological: He is alert and oriented to person, place, and time. Skin: Rash noted. There is erythema.             Investigations:     Laboratory Testing:  Recent Results (from the past 24 hour(s))   CBC Auto Differential    Collection Time: 06/12/19 11:56 AM   Result Value Ref Range    WBC 9.8 3.5 - 11.0 k/uL    RBC 4.22 (L) 4.5 - 5.9 m/uL    Hemoglobin 11.8 (L) 13.5 - 17.5 g/dL    Hematocrit 36.2 (L) 41 - 53 %    MCV 85.8 80 - 100 fL    MCH 27.9 26 - 34 pg    MCHC 32.6 31 - 37 g/dL    RDW 16.6 (H) 11.5 - 14.9 %    Platelets 373 123 - 441 k/uL    MPV 9.0 6.0 - 12.0 fL    NRBC Automated NOT REPORTED per 100 WBC    Differential Type NOT REPORTED     Seg Neutrophils 79 (H) 36 - 66 %    Lymphocytes 11 (L) 24 - 44 %    Monocytes 7 1 - 7 %    Eosinophils % 2 0 - 4 %    Basophils 1 0 - 2 %    Immature Granulocytes NOT REPORTED 0 %    Segs Absolute 7.80 1.3 - 9.1 k/uL    Absolute Lymph # 1.10 1.0 - 4.8 k/uL    Absolute Mono # 0.70 0.1 - 1.3 k/uL    Absolute Eos # 0.10 0.0 - 0.4 k/uL    Basophils # 0.00 0.0 - 0.2 k/uL    Absolute Immature Granulocyte NOT REPORTED 0.00 - 0.30 k/uL    WBC Morphology NOT REPORTED     RBC Morphology NOT REPORTED     Platelet Estimate NOT REPORTED    Basic Metabolic Panel    Collection Time: 06/12/19 11:56 AM   Result Value Ref Range    Glucose 75 70 - 99 mg/dL    BUN 38 (H) 6 - 20 mg/dL    CREATININE 1.67 (H) 0.70 - 1.20 mg/dL    Bun/Cre Ratio NOT REPORTED 9 - 20    Calcium 9.5 8.6 - 10.4 mg/dL    Sodium 138 135 - 144 mmol/L    Potassium 4.3 3.7 - 5.3 mmol/L    Chloride 100 98 - 107 mmol/L    CO2 24 20 - 31 mmol/L    Anion Gap 14 9 - 17 mmol/L    GFR Non-African American 43 (L) >60 mL/min    GFR  52 (L) >60 mL/min    GFR Comment          GFR Staging NOT REPORTED    POC Glucose Fingerstick    Collection Time: 06/12/19  4:00 PM   Result Value Ref Range    POC Glucose 138 (H) 75 - 110 mg/dL       Imaging/Diagnostics:  Us Scrotum And Testicles    Result Date: 5/31/2019  EXAMINATION: ULTRASOUND OF THE SCROTUM/TESTICLES WITH COLOR DOPPLER FLOW EVALUATION 5/31/2019 COMPARISON: None. HISTORY: ORDERING SYSTEM PROVIDED HISTORY: Hydrocele, bilateral FINDINGS: Measurements: Right testicle: 4.9 x 2.5 x 2.8 cm Left testicle: 4.8 x 3.9 x 2.2 cm Right: Grey scale: The right testicle demonstrates normal homogeneous echotexture without focal lesion. No evidence of testicular microlithiasis. Doppler Evaluation:  There is normal arterial and venous Doppler flow within the testicle. Scrotal Sac:  Large hydrocele. Tiny echogenic focus along the tunica. Epididymis:  Not readily visualized sonographically Left: Grey scale: The left testicle demonstrates normal homogeneous echotexture without focal lesion. No evidence of testicular microlithiasis. Doppler Evaluation:  There is normal arterial and venous Doppler flow within the testicle. Scrotal Sac:  Large hydrocele. Epididymis:  Not readily visualized sonographically     Large bilateral hydroceles.        Assessment :      Primary Problem  Cellulitis    Active Hospital Problems    Diagnosis Date Noted    Cellulitis [L03.90] 06/12/2019    Essential hypertension [I10]     CKD (chronic kidney disease) stage 3, GFR 30-59 ml/min ([facility]) [N18.3] 05/08/2013       Plan:     Patient status Admit as inpatient in the  Med/Surge    Left axillary cellulitis  Clindamycin, doxycycline  Consult infectious diseases, conditions appreciated  Pain control PRN  Possible reaction from IV vancomycin    Resume home hypertensive medications    COPD  Home medication regiment    Heparin for VTE prophylaxis  Protonix for GI prophylaxis      Consultations:   IP CONSULT TO PRIMARY CARE PROVIDER  IP CONSULT TO INFECTIOUS DISEASES    Patient is admitted as inpatient status because of co-morbiditieslisted above, severity of signs and symptoms as outlined, requirement for current medical therapies and most importantly because of direct risk to patient if care not provided in a hospital setting. Arlen Johnson MD  6/12/2019  4:42 PM    Copy sent to Dr. Trisha Clifford MD  Attending Physician Statement  I Independently seen and examined the patient. I have discussed the care of the patient, including pertinent history and exam findings,  with the resident. I have reviewed the key elements of all parts of the encounter with the resident. I agree with the assessment, plan and orders as documented by the resident.     Isaac Suh

## 2019-06-12 NOTE — PROGRESS NOTES
Pharmacy Note  Vancomycin Consult    Dewayne Duoglas. is a 46 y.o. male started on Vancomycin for Cellulitis of left axilla ; consult received from Dr. Gigi Franco in ED, then Angeli to manage therapy.      Patient Active Problem List   Diagnosis    DDD (degenerative disc disease), lumbar    Hypertriglyceridemia    CKD (chronic kidney disease) stage 3, GFR 30-59 ml/min (HCC)    CAD (coronary artery disease) s/p 1 stent    Mixed type COPD (chronic obstructive pulmonary disease) ([address]    Type 2 diabetes mellitus with diabetic polyneuropathy, with long-term current use of insulin (HCC)    Chronic pancreatitis (HCC)    Displacement of lumbar intervertebral disc without myelopathy    Chronic diastolic congestive heart failure (HCC)    Insomnia    BPH (benign prostatic hyperplasia)    Class 3 obesity due to excess calories with serious comorbidity and body mass index (BMI) of 50.0 to 59.9 in adult    Essential hypertension    Hepatic steatosis    History of rib fracture    Umbilical hernia    Adrenal gland anomaly    Spinal stenosis of lumbar region without neurogenic claudication    Chronic back pain greater than 3 months duration    Gastroesophageal reflux disease without esophagitis    Hyperlipidemia with target LDL less than 70    Lower urinary tract symptoms (LUTS)    Vitamin D deficiency    Iron deficiency anemia due to chronic blood loss    BARBY treated with BiPAP    Chronic midline low back pain with left-sided sciatica    Stasis dermatitis of both legs    Anxiety    Intertrigo axillary b/l    History of recurrent ear infection    Mixed conductive and sensorineural hearing loss of both ears    Tinnitus of both ears    Slow transit constipation    Chronic infection of both external ears    Bilateral leg edema    Tinea pedis of both feet    Onychomycosis    MDD (major depressive disorder), recurrent severe, without psychosis ([address]    Hydrocele, bilateral    Celiac artery stenosis ([address]    Myopia    Nuclear nonsenile cataract    Presbyopia    Postlaminectomy syndrome of lumbar region    Venous insufficiency of left lower extremity    Bilateral hearing loss    Cellulitis       Allergies:  Lorazepam; Nsaids; and Levofloxacin     Temp max: 36.7 C    Recent Labs     06/12/19  1156   BUN 38*       Recent Labs     06/12/19  1156   CREATININE 1.67*       Recent Labs     06/12/19  1156   WBC 9.8       No intake or output data in the 24 hours ending 06/12/19 1353    Culture Date      Source                       Results  none    Ht Readings from Last 1 Encounters:   06/12/19 6' (1.829 m)        Wt Readings from Last 1 Encounters:   06/12/19 (!) 407 lb (184.6 kg)         Body mass index is 55.2 kg/m². CrCL (IBW) 55 ml/min IBW 77.6 kg     Goal Trough Level: 10-20 mcg/mL    Assessment/Plan:  Will initiate vancomycin 1500 mg IV in ED then 1500 mg every 12 hours per morbidly obese protocol and CrCl (based on IBW) every 12 hours. Timing of trough level will be determined based on culture results, renal function, and clinical response. Thank you for the consult. Will continue to follow. Adrián Stewart RPh.  [id number] 6/12/2019 1:58 PM

## 2019-06-12 NOTE — ED NOTES
Pt called RN into room by pt. Pt states he feels like the room is spinning, itchy, sob, and started to throw up in emesis bag. Vancomycin paused. IV pump states 9ml of vancomycin has been infused. Dr. Lianet Hagen notified.       Amandeep Sotelo RN  06/12/19 [id number]

## 2019-06-12 NOTE — ED NOTES
Report given to Wilbur Carrasco RN from [facility]. Report method by phone   The following was reviewed with receiving RN:   Current vital signs:  BP (!) 111/48   Pulse 88   Temp 98.1 °F (36.7 °C) (Oral)   Resp 18   Ht 6' (1.829 m)   Wt (!) 407 lb (184.6 kg)   SpO2 92%   BMI 55.20 kg/m²                MEWS Score: 1     Any medication or safety alerts were reviewed. Any pending diagnostics and notifications were also reviewed, as well as any safety concerns or issues, abnormal labs, abnormal imaging, and abnormal assessment findings. Questions were answered.            Nataly Ha RN  06/12/19 [id number]

## 2019-06-12 NOTE — PROGRESS NOTES
Visit Information    Have you changed or started any medications since your last visit including any over-the-counter medicines, vitamins, or herbal medicines? no   Are you having any side effects from any of your medications? -  no  Have you stopped taking any of your medications? Is so, why? -  no    Have you seen any other physician or provider since your last visit? No  Have you had any other diagnostic tests since your last visit? No  Have you been seen in the emergency room and/or had an admission to a hospital since we last saw you? No  Have you had your routine dental cleaning in the past 6 months? no    Have you activated your [employer] account? If not, what are your barriers?  Yes     Patient Care Team:  Abdirahman Julian MD as PCP - General (Family Medicine)  Abdirahman Julian MD as PCP - [facility]  Justen Rendon MD as Consulting Physician (Otolaryngology)  Reinaldo Coughlin MD as Consulting Physician (Endocrinology)  Jazmín Weinstein DO as Consulting Physician (Cardiology)  Kevin Alonso DPM as Surgeon (Podiatry)  Ben Soares MD as Consulting Physician (Ophthalmology)  Jarrod Fernandez MD as Consulting Physician (Nephrology)  Rick Samson MD as Consulting Physician (Pulmonology)  Hazel Lopez MD as Consulting Physician (Pulmonology)  Victor Manuel Veliz MD as Surgeon (Vascular Surgery)  Alona Sol MD as Consulting Physician (Gastroenterology)  Rene Chung [facility] as Pharmacist (Pharmacist)  Hazel Lopez MD as Consulting Physician (Pulmonology)        Health Maintenance   Topic Date Due    Shingles Vaccine (1 of 2) 12/30/2014    Colon cancer screen colonoscopy  11/03/2017    Hepatitis B Vaccine (2 of 3 - Risk 3-dose series) 06/26/2019    A1C test (Diabetic or Prediabetic)  07/15/2019    Lipid screen  09/20/2019    Diabetic retinal exam  10/24/2019    Diabetic foot exam  05/02/2020    Potassium monitoring  05/30/2020    Creatinine monitoring  05/30/2020    DTaP/Tdap/Td vaccine (2 - Td) 09/12/2028    Flu vaccine  Completed    Pneumococcal 0-64 years Vaccine  Completed    Hepatitis C screen  Completed    HIV screen  Completed         [facility]  [facility]  [address]  [address] [facility] [address]  Dept: [phone]  Dept Fax: [phone]    Office Progress/Follow Up Note  Date of patient's visit: 6/12/2019   Patient's Name:  Jazmin Pires YOB: 1965            Patient Care Team:  Saul Wright MD as PCP - General (Family Medicine)  Saul Wright MD as PCP - [facility][1]  Kelli Hu MD as Consulting Physician (Otolaryngology)  Sajan Lopez MD as Consulting Physician (Endocrinology)  Rawleigh Alpers, DO as Consulting Physician (Cardiology)  Karsten Carl DPM as Surgeon (Podiatry)  Jose Joyce MD as Consulting Physician (Ophthalmology)  Sera San MD as Consulting Physician (Nephrology)  Ramona Sanchez MD as Consulting Physician (Pulmonology)  Kvng Mclain MD as Consulting Physician (Pulmonology)  Janis Khanna MD as Surgeon (Vascular Surgery)  Keaton Ramos MD as Consulting Physician (Gastroenterology)  Jacqueline Barney, [address] [place] [address] as Pharmacist (Pharmacist)  Kvng Mclain MD as Consulting Physician (Pulmonology)    REASON FOR VISIT: Rash (May have infection in left axillary states is has an awful odor. There is a mychart message from yesterday. He just started on Clyndimycin this morning. Dr. Sarahy Butterfield wanted him seen ASAP for cellulitis. )        HISTORY OF PRESENT ILLNESS:      History was obtained from the patient. Jazmin Pires is a 46 y.o. is here for recurrent left axilla cellulitis. He has been on Keflex and clindamycin during the last month for this, just started another course or Clinda this morning. He complains of severe pain, tenderness, and has had fever and chills off and on since Sunday.  He states that there is a foul odor, and area began bleeding today.        Patient Active Problem List   Diagnosis    DDD (degenerative disc disease), lumbar    Hypertriglyceridemia    CKD (chronic kidney disease) stage 3, GFR 30-59 ml/min ([facility])    CAD (coronary artery disease) s/p 1 stent    Mixed type COPD (chronic obstructive pulmonary disease) ([address]    Type 2 diabetes mellitus with diabetic polyneuropathy, with long-term current use of insulin (HCC)    Chronic pancreatitis (HCC)    Displacement of lumbar intervertebral disc without myelopathy    Chronic diastolic congestive heart failure (HCC)    Insomnia    BPH (benign prostatic hyperplasia)    Class 3 obesity due to excess calories with serious comorbidity and body mass index (BMI) of 50.0 to 59.9 in adult    Essential hypertension    Hepatic steatosis    History of rib fracture    Umbilical hernia    Adrenal gland anomaly    Spinal stenosis of lumbar region without neurogenic claudication    Chronic back pain greater than 3 months duration    Gastroesophageal reflux disease without esophagitis    Hyperlipidemia with target LDL less than 70    Lower urinary tract symptoms (LUTS)    Vitamin D deficiency    Iron deficiency anemia due to chronic blood loss    BARBY treated with BiPAP    Chronic midline low back pain with left-sided sciatica    Stasis dermatitis of both legs    Anxiety    Intertrigo axillary b/l    History of recurrent ear infection    Mixed conductive and sensorineural hearing loss of both ears    Tinnitus of both ears    Slow transit constipation    Chronic infection of both external ears    Bilateral leg edema    Tinea pedis of both feet    Onychomycosis    MDD (major depressive disorder), recurrent severe, without psychosis ([address]    Hydrocele, bilateral    Celiac artery stenosis ([address]    Myopia    Nuclear nonsenile cataract    Presbyopia    Postlaminectomy syndrome of lumbar region    Venous insufficiency of left lower extremity    Bilateral hearing loss       Allergies   Allergen Reactions    Lorazepam      Falls      Nsaids      CHF&CKD    Levofloxacin Nausea And Vomiting         MEDICATIONS:     Current Outpatient Medications   Medication Sig Dispense Refill    clindamycin (CLEOCIN) 300 MG capsule Take 1 capsule by mouth 4 times daily for 10 days 40 capsule 0    Lactobacillus (PROBIOTIC ACIDOPHILUS) TABS Take 1 tablet by mouth 2 times daily 60 tablet 0    tiotropium (SPIRIVA RESPIMAT) 2.5 MCG/ACT AERS inhaler Inhale 2 puffs into the lungs daily 4 g 3    Lancet Devices (LANCING DEVICE) MISC Provide patient with lancing device appropriate for his machine/lancing needles. 1 each 1    oxyCODONE HCl (OXY-IR) 10 MG immediate release tablet Take 1 tablet by mouth every 8 hours as needed for Pain for up to 30 days. 90 tablet 0    pravastatin (PRAVACHOL) 40 MG tablet Take 1 tablet by mouth nightly 90 tablet 3    nystatin (MYCOSTATIN) 289267 UNIT/GM powder Apply 2- 3 times daily skin folds 60 g 3    varenicline (CHANTIX) 1 MG tablet Take 1 tablet by mouth 2 times daily 60 tablet 1    Lidocaine 4 % LOTN Apply topically      blood glucose test strips (ONE TOUCH ULTRA TEST) strip USE ONE STRIP TO TEST THREE TIMES A  strip 2    metolazone (ZAROXOLYN) 2.5 MG tablet Take 2.5 mg by mouth Twice a Week      PROAIR  (90 Base) MCG/ACT inhaler INHALE TWO PUFFS BY MOUTH EVERY 6 HOURS AS NEEDED FOR WHEEZING OR SHORTNESS OF BREATH 1 Inhaler 4    spironolactone (ALDACTONE) 50 MG tablet Take 1 tablet by mouth daily 90 tablet 3    vitamin D (CHOLECALCIFEROL) 1000 UNIT TABS tablet Take 1 tablet by mouth daily 90 tablet 3    docusate sodium (STOOL SOFTENER) 100 MG capsule Take 1 capsule by mouth 2 times daily 180 capsule 3    venlafaxine (EFFEXOR) 100 MG tablet Take 1 tablet by mouth 2 times daily 60 tablet 3    Handicap Placard [facility] by Does not apply route Can't walk greater than 200 feet.  Expires in 5 years. 1 each 0    tiZANidine (ZANAFLEX) 4 MG tablet TAKE ONE TABLET BY MOUTH EVERY 8 HOURS AS NEEDED FOR BACK PAIN 90 tablet 5    pantoprazole (PROTONIX) 40 MG tablet Take 1 tablet by mouth nightly 90 tablet 3    miconazole (MICOTIN) 2 % powder Apply topically 2 times daily. 45 g 1    Ferrous Sulfate (IRON) 325 (65 Fe) MG TABS Take 1 tablet by mouth daily 90 tablet 3    metoprolol tartrate (LOPRESSOR) 50 MG tablet Take 1 tablet by mouth 2 times daily 180 tablet 3    lisinopril (PRINIVIL;ZESTRIL) 5 MG tablet Take 1 tablet by mouth daily . Discontinued Lisinopril  10 mg 90 tablet 3    clopidogrel (PLAVIX) 75 MG tablet Take 1 tablet by mouth daily 90 tablet 2    bumetanide (BUMEX) 1 MG tablet Take 3 tablets by mouth 2 times daily 180 tablet 1    fluticasone-salmeterol (ADVAIR HFA) 230-21 MCG/ACT inhaler Inhale 2 puffs into the lungs 2 times daily 12 g 5    insulin regular human (HUMULIN R) 500 UNIT/ML concentrated injection vial Inject into the skin 3 times daily Indications: 50 in the morning 50 at lunch and 40 at dinner. Inject 250 units with breakfast and lunch, and inject 175 units with dinner.  nitroGLYCERIN (NITROSTAT) 0.4 MG SL tablet Place 1 tablet under the tongue every 5 minutes as needed for Chest pain 25 tablet 3    fluticasone (CUTIVATE) 0.05 % cream Apply topically 2 times daily       fluticasone (FLONASE) 50 MCG/ACT nasal spray 2 sprays by Nasal route daily (Patient taking differently: 2 sprays by Nasal route daily as needed (sinus symptoms) ) 1 Bottle 3    Melatonin 10 MG TABS Take 10 mg by mouth nightly as needed (insomnia) 90 tablet 1    COMFORT ASSIST INSULIN SYRINGE 31G X 5/16\" 1 ML MISC       aspirin 81 MG EC tablet Take 81 mg by mouth daily.       albuterol (PROVENTIL) (2.5 MG/3ML) 0.083% nebulizer solution Take 3 mLs by nebulization every 6 hours as needed for Wheezing or Shortness of Breath 120 vial 0    Spacer/Aero Chamber Mouthpiece MISC 1 each by Does not apply route once as needed (to be used with his inhalers) 1 each 0     No current facility-administered medications for this visit. SOCIAL HISTORY    Reviewed and updated. Miguel  reports that he quit smoking about 7 weeks ago. His smoking use included cigarettes. He started smoking about 33 years ago. He has a 66.00 pack-year smoking history. He quit smokeless tobacco use about 23 years ago. His smokeless tobacco use included snuff. FAMILY HISTORY:    Reviewed and updated. family history includes Diabetes in his mother and sister; Heart Attack in his sister; Heart Disease in his mother and sister; High Blood Pressure in his father and mother; Kidney Disease in his father; Obesity in his sister. REVIEW OF SYSTEMS:      Review of Systems   Constitutional: Positive for chills, fatigue and fever. Respiratory: Positive for shortness of breath. Negative for cough and wheezing. Cardiovascular: Negative for chest pain and palpitations. Gastrointestinal: Negative for abdominal pain and diarrhea. Musculoskeletal: Positive for arthralgias and gait problem. Skin: Positive for color change and rash. Neurological: Negative for dizziness. Psychiatric/Behavioral: The patient is not nervous/anxious. PHYSICAL EXAM:      Vitals:    06/12/19 0817   BP: 135/63   Site: Right Upper Arm   Position: Sitting   Cuff Size: Large Adult   Pulse: 99   Temp: 97.7 °F (36.5 °C)   TempSrc: Oral   SpO2: 96%   Weight: (!) 406 lb (184.2 kg)   Height: 6' (1.829 m)     BP Readings from Last 3 Encounters:   06/12/19 135/63   06/04/19 132/68   05/30/19 112/60        Physical Exam   Constitutional: He is oriented to person, place, and time. He appears well-developed and well-nourished. No distress. Cardiovascular: Normal rate and regular rhythm. No murmur heard. Pulmonary/Chest: Effort normal. He has decreased breath sounds. He has no wheezes. Abdominal: Soft. Bowel sounds are normal. There is no tenderness.    Musculoskeletal: Walks with cane   Neurological: He is alert and oriented to person, place, and time. Skin: Rash noted. There is erythema. LABORATORY FINDINGS:    CBC:   Lab Results   Component Value Date    WBC 9.9 05/09/2019    HGB 11.4 05/09/2019     05/09/2019     BMP:   Lab Results   Component Value Date     05/30/2019    K 4.5 05/30/2019     05/30/2019    CO2 25 05/30/2019    BUN 36 05/30/2019    CREATININE 1.60 05/30/2019    GLUCOSE 161 05/30/2019     HEMOGLOBIN A1C:   Lab Results   Component Value Date    LABA1C 7.2 05/02/2019     FASTING LIPID PANEL:   Lab Results   Component Value Date    CHOL 161 09/20/2018    HDL 28 (L) 09/20/2018    LDLCHOLESTEROL 71 09/20/2018    TRIG 312 (H) 09/20/2018       ASSESSMENT AND PLAN:      Visit Diagnoses and Associated Orders     Cellulitis of left axilla    -  Primary    Worsening, recurrent, on several courses of oral ATB in last month. Plan to direct admit to [facility]         Fever, unspecified fever cause                    FOLLOW UP AND INSTRUCTIONS:     · Patient seen along with Dr Jose Arriaga, who agrees with plan to admit patient for further eval and management. · Dr Annabelle Dotson notified & patient instructed to go through ER. NICOLE Bales - CNP    6/12/2019, 9:41 AM    Please note that this chart was generated using voice recognition Dragon dictation software. Although every effort was made to ensure the accuracy of this automatedtranscription, some errors in transcription may have occurred.

## 2019-06-12 NOTE — PLAN OF CARE
Problem: Falls - Risk of:  Goal: Will remain free from falls  Description  Will remain free from falls  Outcome: Ongoing  Goal: Absence of physical injury  Description  Absence of physical injury  Outcome: Ongoing     Problem: Tissue Perfusion - Peripheral, Altered:  Goal: Circulatory function within specified parameters  Description  Circulatory function within specified parameters  Outcome: Ongoing

## 2019-06-12 NOTE — ED PROVIDER NOTES
[address]  eMERGENCY dEPARTMENT eNCOUnter      Pt Name: Dewayne Douglas. MRN: [id number]  Birthdate 1965  Date of evaluation: 6/12/19      CHIEF COMPLAINT       Chief Complaint   Patient presents with    Cellulitis     R axilla         HISTORY OF PRESENT ILLNESS    Miguel Rosenthal. is a 46 y.o. male who presents complaining of infection. Patient states he has been battling and infection in his left axilla for the last 2 weeks. Patient states he has been having problems with these infections over the last year. Patient has been admitted a couple times for these. Patient just got done completing an antibiotic course that failed and he went and saw his doctor today and she sent him here to be admitted for IV antibiotics. Patient required vancomycin the last time he had the infection. Patient states otherwise he is not having any chest pain is having no fevers. Pain is severe especially with movement of the arm or palpation. REVIEW OF SYSTEMS       Review of Systems   Constitutional: Negative for activity change, appetite change, chills, diaphoresis and fever. HENT: Negative for congestion, ear pain, facial swelling, nosebleeds, rhinorrhea, sinus pressure, sore throat and trouble swallowing. Eyes: Negative for pain, discharge and redness. Respiratory: Negative for cough, chest tightness, shortness of breath and wheezing. Cardiovascular: Negative for chest pain, palpitations and leg swelling. Gastrointestinal: Negative for abdominal pain, blood in stool, constipation, diarrhea, nausea and vomiting. Genitourinary: Negative for difficulty urinating, dysuria, flank pain, frequency, genital sores and hematuria. Musculoskeletal: Negative for arthralgias, back pain, gait problem, joint swelling, myalgias and neck pain. Skin: Positive for wound. Negative for color change, pallor and rash.    Neurological: Negative for dizziness, tremors, seizures, syncope, speech difficulty, weakness, numbness and headaches. Psychiatric/Behavioral: Negative for confusion, decreased concentration, hallucinations, self-injury, sleep disturbance and suicidal ideas.        PAST MEDICAL HISTORY     Past Medical History:   Diagnosis Date    Acute bronchitis with chronic obstructive pulmonary disease (COPD) ([address]     Acute on chronic kidney failure ([address] 7/20/2017    Acute on chronic respiratory failure (HCC) 10/2/2018    Adhesive capsulitis of left shoulder 3/25/2017    Anxiety 10/2/2016    Arthropathy, unspecified, other specified sites 6/13/2013    Asthma     Bilateral lower leg cellulitis 12/24/2016    Bilateral lower leg cellulitis 7/5/2017    Bilateral lower leg cellulitis 2/17/2016    Blood in stool     CAD (coronary artery disease)     Cellulitis of both lower extremities 5/25/2017    Cellulitis of leg, left 7/20/2017    CHF (congestive heart failure), NYHA class III ([address] 8/14/2013    Chronic back pain     Chronic headaches     was referred to neuro, testing scheduled    Chronic kidney disease     Chronic ulcer of left leg, with fat layer exposed ([address] 2/22/2019    COPD (chronic obstructive pulmonary disease) ([address]     COPD exacerbation ([address] 11/2/2016    Diabetic neuropathy ([address] 8/14/2013    Displacement of lumbar intervertebral disc without myelopathy 6/13/2013    Ear infection     RIGHT    Facial cellulitis 2012    Fall 3/25/2017    GERD (gastroesophageal reflux disease)     Head injury     Hearing loss in right ear     pencil pierced ear as a child    Hepatic steatosis 12/3/2015    History of general anesthesia complication     has woke up during surgery under anesthesia    History of rib fracture 12/3/2015    Chronic     Hyperlipidemia     Hypertension     Insomnia     Intolerance of continuous positive airway pressure (CPAP) ventilation 7/20/2017    Mastoiditis of left side     Mixed conductive and sensorineural hearing loss of both ears 1/10/2017    Per ENT    Mixed type COPD (chronic obstructive pulmonary disease) ([address]     Moderate recurrent major depression ([address] 10/2/2016    Morbid obesity with BMI of 45.0-49.9, adult ([address] 6/16/2015    Neuropathy     Obesity     (BMI 35.0-39.9 without comorbidity)    Open wound of groin 12/19/2018    BARBY on CPAP     Osteoarthritis     Otitis externa of left ear     Pancreatitis chronic     Renal insufficiency     proteinuria    S/P cardiac cath 04/23/2018    Severe depression ([address] 9/25/2013    Spinal stenosis of lumbar region without neurogenic claudication 1/6/2016    MRI lumbar 12/30/15 L3-L4: There is broad-based bulging disc which appears protruding left laterally causing flattening of the ventral thecal sac. In addition, there is facet arthropathy with mild hypertrophic changes.  There is borderline central canal stenosis with  evidence of moderate left neural foraminal narrowing and mild right neural foramina narrowing.   L4-L5: There is broad-based protrud    Syncope 4/28/2017    Tinnitus of both ears 1/10/2017    Per ENT    Type 2 diabetes mellitus with stage 3 chronic kidney disease, with long-term current use of insulin ([address] 12/26/2016    due to underlying condition with hyperosmolarity without coma    Type II or unspecified type diabetes mellitus without mention of complication, not stated as uncontrolled     uncontrolled    Wears glasses     for reading    Wound of left leg 2/22/2019       SURGICAL HISTORY       Past Surgical History:   Procedure Laterality Date    BACK SURGERY   (x 4) 2000,.12/2011.2/2012     Dr Glass Alt last 2 surg    CARDIAC CATHETERIZATION  04/23/2018    no stenting    COLONOSCOPY  11/3/2015    hemorrhoids, poor prep    CORONARY ANGIOPLASTY WITH STENT PLACEMENT  March 2013    x 1    HAND TENDON SURGERY Left     thumb tendon repair    KNEE ARTHROSCOPY Left     NERVE BLOCK  07-31-15    TENS unit    IA ESOPHAGOGASTRODUODENOSCOPY TRANSORAL DIAGNOSTIC N/A 7/18/2018 EGD ESOPHAGOGASTRODUODENOSCOPY performed by Keaton Ramos MD at [address] Bilateral 09/20/2012    Dr Isai Alvarenga  4/13/2013    normal       CURRENT MEDICATIONS       Previous Medications    ALBUTEROL (PROVENTIL) (2.5 MG/3ML) 0.083% NEBULIZER SOLUTION    Take 3 mLs by nebulization every 6 hours as needed for Wheezing or Shortness of Breath    ASPIRIN 81 MG EC TABLET    Take 81 mg by mouth daily. BLOOD GLUCOSE TEST STRIPS (ONE TOUCH ULTRA TEST) STRIP    USE ONE STRIP TO TEST THREE TIMES A DAY    BUMETANIDE (BUMEX) 1 MG TABLET    Take 3 tablets by mouth 2 times daily    CLINDAMYCIN (CLEOCIN) 300 MG CAPSULE    Take 1 capsule by mouth 4 times daily for 10 days    CLOPIDOGREL (PLAVIX) 75 MG TABLET    Take 1 tablet by mouth daily    COMFORT ASSIST INSULIN SYRINGE 31G X 5/16\" 1 ML MISC        DOCUSATE SODIUM (STOOL SOFTENER) 100 MG CAPSULE    Take 1 capsule by mouth 2 times daily    FERROUS SULFATE (IRON) 325 (65 FE) MG TABS    Take 1 tablet by mouth daily    FLUTICASONE (CUTIVATE) 0.05 % CREAM    Apply topically 2 times daily     FLUTICASONE (FLONASE) 50 MCG/ACT NASAL SPRAY    2 sprays by Nasal route daily    FLUTICASONE-SALMETEROL (ADVAIR HFA) 230-21 MCG/ACT INHALER    Inhale 2 puffs into the lungs 2 times daily    HANDICAP PLACARD MISC    by Does not apply route Can't walk greater than 200 feet. Expires in 5 years. INSULIN REGULAR HUMAN (HUMULIN R) 500 UNIT/ML CONCENTRATED INJECTION VIAL    Inject into the skin 3 times daily Indications: 50 in the morning 50 at lunch and 40 at dinner. Inject 250 units with breakfast and lunch, and inject 175 units with dinner. LACTOBACILLUS (PROBIOTIC ACIDOPHILUS) TABS    Take 1 tablet by mouth 2 times daily    LANCET DEVICES (LANCING DEVICE) MISC    Provide patient with lancing device appropriate for his machine/lancing needles.     LIDOCAINE 4 % LOTN    Apply topically    LISINOPRIL (PRINIVIL;ZESTRIL) 5 MG TABLET smokeless tobacco use included snuff. He reports that he has current or past drug history. Drug: Marijuana. He reports that he does not drink alcohol. PHYSICAL EXAM     INITIAL VITALS: BP (!) 108/45   Pulse 101   Temp 98 °F (36.7 °C) (Oral)   Resp 24   Ht 6' (1.829 m)   Wt (!) 407 lb (184.6 kg)   SpO2 96%   BMI 55.20 kg/m²      Physical Exam   Constitutional: He is oriented to person, place, and time. He appears well-developed and well-nourished. No distress. HENT:   Head: Normocephalic and atraumatic. Eyes: Pupils are equal, round, and reactive to light. Conjunctivae and EOM are normal. Right eye exhibits no discharge. Left eye exhibits no discharge. No scleral icterus. Cardiovascular: Regular rhythm and normal heart sounds. Tachycardia present. Exam reveals no gallop and no friction rub. No murmur heard. Pulmonary/Chest: Effort normal and breath sounds normal. No respiratory distress. He has no wheezes. He has no rales. He exhibits no tenderness. Abdominal: Soft. Bowel sounds are normal. He exhibits no distension and no mass. There is no tenderness. There is no rebound and no guarding. Musculoskeletal: Normal range of motion. He exhibits no edema or tenderness. Neurological: He is alert and oriented to person, place, and time. He displays normal reflexes. No cranial nerve deficit or sensory deficit. He exhibits normal muscle tone. Coordination normal.   Skin: Skin is warm and dry. No rash noted. He is not diaphoretic. There is erythema (Left axilla). No pallor. Psychiatric: He has a normal mood and affect. His behavior is normal. Judgment and thought content normal.   Nursing note and vitals reviewed. DIAGNOSTIC RESULTS     RADIOLOGY:All plain film, CT,MRI, and formal ultrasound images (except ED bedside ultrasound) are read by the radiologist and the interpretations are directly viewed by the emergency physician.          LABS: All lab results were reviewed by myself, and all abnormals are listed below. Labs Reviewed   CBC WITH AUTO DIFFERENTIAL - Abnormal; Notable for the following components:       Result Value    RBC 4.22 (*)     Hemoglobin 11.8 (*)     Hematocrit 36.2 (*)     RDW 16.6 (*)     Seg Neutrophils 79 (*)     Lymphocytes 11 (*)     All other components within normal limits   BASIC METABOLIC PANEL         MEDICAL DECISION MAKING:     Patient appears to have a cellulitis possibly even a fungal infection in the axilla. Patient is already failed a course of outpatient antibiotics and was sent in by his doctor for an admission. We will start him on IV vancomycin check some basic labs and then speak with the doctor. EMERGENCY DEPARTMENT COURSE:   Vitals:    Vitals:    06/12/19 1126 06/12/19 1130 06/12/19 1145   BP: (!) 127/40 (!) 136/55 (!) 108/45   Pulse: 101     Resp: 24     Temp: 98 °F (36.7 °C)     TempSrc: Oral     SpO2: 96%     Weight: (!) 407 lb (184.6 kg)     Height: 6' (1.829 m)         The patient was given the following medications while in the emergency department:  Orders Placed This Encounter   Medications    vancomycin (VANCOCIN) 1,500 mg in dextrose 5 % 250 mL IVPB    ondansetron (ZOFRAN) injection 4 mg       -------------------------  12:16 PM I discussed the case with Dr. Hunter Craig who agrees to the admission. Patient will be placed in as inpatient due to the patient's failed outpatient antibiotics x2 and continued symptoms for greater than a month. 12:21 PM called into the room to see the patient. Patient made the nurse stopped taking the vancomycin because he started feeling very lightheaded dizzy like the room was spinning started having a lot of shortness of breath and feel like his tongue swelling. Patient is breathing heavily and his pulse ox is in the 80s. Put him on oxygen and we will have respiratory evaluate him. Patient does have a history of COPD and sleep apnea.   We will give him Solu-Medrol and Benadryl and have respiratory give a breathing treatment. We will keep a close eye on him for possible need for epinephrine. CONSULTS:  PHARMACY TO DOSE VANCOMYCIN  IP CONSULT TO PRIMARY CARE PROVIDER    PROCEDURES:  None    FINAL IMPRESSION      1.  Cellulitis of left axilla          DISPOSITION/PLAN   DISPOSITION Admitted 06/12/2019 12:15:41 PM      PATIENT REFERREDTO:  Noel Patterson MD  [address] [place] [address].  [facility]            Brandeerowenalisbeth Carmelo:  New Prescriptions    No medications on file       (Please note that portions of this note were completed with a voice recognition program.  Efforts were made to edit thedictations but occasionally words are mis-transcribed.)    Deandre Del Rio MD  Attending Emergency Physician                        Deandre Del Rio MD  06/12/19 1305 [facility] Jose Kamara MD  06/12/19 1223

## 2019-06-13 ENCOUNTER — APPOINTMENT (OUTPATIENT)
Dept: PHARMACY | Age: 54
End: 2019-06-13
Payer: COMMERCIAL

## 2019-06-13 ENCOUNTER — APPOINTMENT (OUTPATIENT)
Dept: INTERVENTIONAL RADIOLOGY/VASCULAR | Age: 54
DRG: 603 | End: 2019-06-13
Payer: COMMERCIAL

## 2019-06-13 LAB
BUN BLDV-MCNC: 47 MG/DL (ref 6–20)
C-REACTIVE PROTEIN: 31.7 MG/L (ref 0–5)
CREAT SERPL-MCNC: 1.8 MG/DL (ref 0.7–1.2)
GFR AFRICAN AMERICAN: 48 ML/MIN
GFR NON-AFRICAN AMERICAN: 40 ML/MIN
GFR SERPL CREATININE-BSD FRML MDRD: ABNORMAL ML/MIN/{1.73_M2}
GFR SERPL CREATININE-BSD FRML MDRD: ABNORMAL ML/MIN/{1.73_M2}
GLUCOSE BLD-MCNC: 291 MG/DL (ref 75–110)
GLUCOSE BLD-MCNC: 319 MG/DL (ref 75–110)
GLUCOSE BLD-MCNC: 422 MG/DL (ref 75–110)
GLUCOSE BLD-MCNC: 478 MG/DL (ref 75–110)

## 2019-06-13 PROCEDURE — 2709999900 IR FLUORO GUIDED CVA DEVICE PLMT/REPLACE/REMOVAL

## 2019-06-13 PROCEDURE — 99222 1ST HOSP IP/OBS MODERATE 55: CPT | Performed by: INTERNAL MEDICINE

## 2019-06-13 PROCEDURE — 6370000000 HC RX 637 (ALT 250 FOR IP): Performed by: INTERNAL MEDICINE

## 2019-06-13 PROCEDURE — 6370000000 HC RX 637 (ALT 250 FOR IP): Performed by: STUDENT IN AN ORGANIZED HEALTH CARE EDUCATION/TRAINING PROGRAM

## 2019-06-13 PROCEDURE — 82947 ASSAY GLUCOSE BLOOD QUANT: CPT

## 2019-06-13 PROCEDURE — 2500000003 HC RX 250 WO HCPCS: Performed by: INTERNAL MEDICINE

## 2019-06-13 PROCEDURE — 82565 ASSAY OF CREATININE: CPT

## 2019-06-13 PROCEDURE — 02HV33Z INSERTION OF INFUSION DEVICE INTO SUPERIOR VENA CAVA, PERCUTANEOUS APPROACH: ICD-10-PCS | Performed by: RADIOLOGY

## 2019-06-13 PROCEDURE — 36415 COLL VENOUS BLD VENIPUNCTURE: CPT

## 2019-06-13 PROCEDURE — 6360000002 HC RX W HCPCS: Performed by: STUDENT IN AN ORGANIZED HEALTH CARE EDUCATION/TRAINING PROGRAM

## 2019-06-13 PROCEDURE — 36573 INSJ PICC RS&I 5 YR+: CPT | Performed by: RADIOLOGY

## 2019-06-13 PROCEDURE — 1200000000 HC SEMI PRIVATE

## 2019-06-13 PROCEDURE — 86140 C-REACTIVE PROTEIN: CPT

## 2019-06-13 PROCEDURE — 2580000003 HC RX 258: Performed by: INTERNAL MEDICINE

## 2019-06-13 PROCEDURE — 99233 SBSQ HOSP IP/OBS HIGH 50: CPT | Performed by: INTERNAL MEDICINE

## 2019-06-13 PROCEDURE — 84520 ASSAY OF UREA NITROGEN: CPT

## 2019-06-13 RX ORDER — INSULIN GLARGINE 100 [IU]/ML
10 INJECTION, SOLUTION SUBCUTANEOUS NIGHTLY
Status: DISCONTINUED | OUTPATIENT
Start: 2019-06-13 | End: 2019-06-14 | Stop reason: HOSPADM

## 2019-06-13 RX ADMIN — INSULIN GLARGINE 10 UNITS: 100 INJECTION, SOLUTION SUBCUTANEOUS at 21:43

## 2019-06-13 RX ADMIN — INSULIN HUMAN 50 UNITS: 500 INJECTION, SOLUTION SUBCUTANEOUS at 11:43

## 2019-06-13 RX ADMIN — HEPARIN SODIUM 5000 UNITS: 5000 INJECTION INTRAVENOUS; SUBCUTANEOUS at 06:32

## 2019-06-13 RX ADMIN — VARENICLINE TARTRATE 1 MG: 1 TABLET, FILM COATED ORAL at 08:48

## 2019-06-13 RX ADMIN — OXYCODONE HYDROCHLORIDE 10 MG: 10 TABLET ORAL at 11:22

## 2019-06-13 RX ADMIN — METOPROLOL TARTRATE 50 MG: 50 TABLET ORAL at 21:42

## 2019-06-13 RX ADMIN — PANTOPRAZOLE SODIUM 40 MG: 40 TABLET, DELAYED RELEASE ORAL at 21:42

## 2019-06-13 RX ADMIN — DOXYCYCLINE 100 MG: 100 INJECTION, POWDER, LYOPHILIZED, FOR SOLUTION INTRAVENOUS at 02:02

## 2019-06-13 RX ADMIN — HEPARIN SODIUM 5000 UNITS: 5000 INJECTION INTRAVENOUS; SUBCUTANEOUS at 21:44

## 2019-06-13 RX ADMIN — CLINDAMYCIN PHOSPHATE 600 MG: 150 INJECTION, SOLUTION, CONCENTRATE INTRAVENOUS at 11:22

## 2019-06-13 RX ADMIN — INSULIN LISPRO 12 UNITS: 100 INJECTION, SOLUTION INTRAVENOUS; SUBCUTANEOUS at 11:43

## 2019-06-13 RX ADMIN — INSULIN LISPRO 15 UNITS: 100 INJECTION, SOLUTION INTRAVENOUS; SUBCUTANEOUS at 17:22

## 2019-06-13 RX ADMIN — MICONAZOLE NITRATE: 20.6 POWDER TOPICAL at 21:42

## 2019-06-13 RX ADMIN — FLUOCINONIDE: 0.5 CREAM TOPICAL at 21:43

## 2019-06-13 RX ADMIN — OXYCODONE HYDROCHLORIDE 10 MG: 10 TABLET ORAL at 19:53

## 2019-06-13 RX ADMIN — SPIRONOLACTONE 50 MG: 25 TABLET, FILM COATED ORAL at 08:46

## 2019-06-13 RX ADMIN — BUMETANIDE 3 MG: 1 TABLET ORAL at 15:23

## 2019-06-13 RX ADMIN — OXYCODONE HYDROCHLORIDE 10 MG: 10 TABLET ORAL at 02:01

## 2019-06-13 RX ADMIN — INSULIN LISPRO 6 UNITS: 100 INJECTION, SOLUTION INTRAVENOUS; SUBCUTANEOUS at 21:43

## 2019-06-13 RX ADMIN — MOMETASONE FUROATE AND FORMOTEROL FUMARATE DIHYDRATE 2 PUFF: 200; 5 AEROSOL RESPIRATORY (INHALATION) at 08:47

## 2019-06-13 RX ADMIN — FERROUS SULFATE TAB 325 MG (65 MG ELEMENTAL FE) 325 MG: 325 (65 FE) TAB at 08:46

## 2019-06-13 RX ADMIN — ACETAMINOPHEN 650 MG: 325 TABLET, FILM COATED ORAL at 08:38

## 2019-06-13 RX ADMIN — INSULIN HUMAN 40 UNITS: 500 INJECTION, SOLUTION SUBCUTANEOUS at 17:21

## 2019-06-13 RX ADMIN — METOLAZONE 2.5 MG: 2.5 TABLET ORAL at 08:48

## 2019-06-13 RX ADMIN — LISINOPRIL 5 MG: 5 TABLET ORAL at 08:44

## 2019-06-13 RX ADMIN — Medication 1 CAPSULE: at 08:44

## 2019-06-13 RX ADMIN — PRAVASTATIN SODIUM 40 MG: 40 TABLET ORAL at 21:42

## 2019-06-13 RX ADMIN — CLOPIDOGREL BISULFATE 75 MG: 75 TABLET, FILM COATED ORAL at 21:42

## 2019-06-13 RX ADMIN — DOXYCYCLINE 100 MG: 100 INJECTION, POWDER, LYOPHILIZED, FOR SOLUTION INTRAVENOUS at 15:15

## 2019-06-13 RX ADMIN — Medication 1 CAPSULE: at 15:18

## 2019-06-13 RX ADMIN — MOMETASONE FUROATE AND FORMOTEROL FUMARATE DIHYDRATE 2 PUFF: 200; 5 AEROSOL RESPIRATORY (INHALATION) at 21:45

## 2019-06-13 RX ADMIN — BUMETANIDE 3 MG: 1 TABLET ORAL at 08:43

## 2019-06-13 RX ADMIN — CLINDAMYCIN PHOSPHATE 600 MG: 150 INJECTION, SOLUTION, CONCENTRATE INTRAVENOUS at 23:29

## 2019-06-13 RX ADMIN — INSULIN LISPRO 6 UNITS: 100 INJECTION, SOLUTION INTRAVENOUS; SUBCUTANEOUS at 08:39

## 2019-06-13 RX ADMIN — VARENICLINE TARTRATE 1 MG: 1 TABLET, FILM COATED ORAL at 21:54

## 2019-06-13 RX ADMIN — ASPIRIN 81 MG: 81 TABLET, COATED ORAL at 21:42

## 2019-06-13 RX ADMIN — Medication 10 ML: at 21:55

## 2019-06-13 RX ADMIN — CLINDAMYCIN PHOSPHATE 600 MG: 150 INJECTION, SOLUTION, CONCENTRATE INTRAVENOUS at 16:01

## 2019-06-13 RX ADMIN — METOPROLOL TARTRATE 50 MG: 50 TABLET ORAL at 08:44

## 2019-06-13 RX ADMIN — HEPARIN SODIUM 5000 UNITS: 5000 INJECTION INTRAVENOUS; SUBCUTANEOUS at 15:20

## 2019-06-13 RX ADMIN — CLINDAMYCIN PHOSPHATE 600 MG: 150 INJECTION, SOLUTION, CONCENTRATE INTRAVENOUS at 00:12

## 2019-06-13 ASSESSMENT — PAIN SCALES - GENERAL
PAINLEVEL_OUTOF10: 1
PAINLEVEL_OUTOF10: 7
PAINLEVEL_OUTOF10: 8
PAINLEVEL_OUTOF10: 1
PAINLEVEL_OUTOF10: 0
PAINLEVEL_OUTOF10: 4
PAINLEVEL_OUTOF10: 9
PAINLEVEL_OUTOF10: 7

## 2019-06-13 ASSESSMENT — PAIN DESCRIPTION - LOCATION
LOCATION: BACK
LOCATION: BACK

## 2019-06-13 ASSESSMENT — ENCOUNTER SYMPTOMS
SHORTNESS OF BREATH: 1
ABDOMINAL PAIN: 0
WHEEZING: 0
COUGH: 0
COLOR CHANGE: 1
DIARRHEA: 0

## 2019-06-13 ASSESSMENT — PAIN DESCRIPTION - ORIENTATION: ORIENTATION: RIGHT;LEFT

## 2019-06-13 ASSESSMENT — PAIN DESCRIPTION - PAIN TYPE
TYPE: CHRONIC PAIN
TYPE: CHRONIC PAIN

## 2019-06-13 NOTE — CONSULTS
Infectious disease Consult Note      Patient: Dilan Winn. : 1965  Acct#:  [id number]     Date:  2019    Subjective:       History of Present Illness  Patient is a 46 y.o.  male admitted with Cellulitis [L03.90] who is seen in consult for the same. He presented with left axilla swelling, redness, and pain worsening over 2 weeks    Symptoms associated with subjective fever, chills and fatigue. Symptoms are aggravated by arm movement. Symptoms are alleviated by rest.    He had tachycardia on admission. History of chronic kidney disease and reported COPD with chronic shortness of breath, hypertension. Has had diabetes, diabetic neuropathy, lower extremity cellulitis past.  Large bilateral hydroceles on recent scrotum ultrasound. History of Levaquin allergy with reported anaphylaxis. Reported itching, vomiting and shortness of breath during IV vancomycin infusion at the ER yesterday.   PICC line was placed  Past Medical History:   Diagnosis Date    Acute bronchitis with chronic obstructive pulmonary disease (COPD) ([address]     Acute on chronic kidney failure ([facility]) 2017    Acute on chronic respiratory failure ([address] 10/2/2018    Adhesive capsulitis of left shoulder 3/25/2017    Anxiety 10/2/2016    Arthropathy, unspecified, other specified sites 2013    Asthma     Bilateral lower leg cellulitis 2016    Bilateral lower leg cellulitis 2017    Bilateral lower leg cellulitis 2016    Blood in stool     CAD (coronary artery disease)     Cellulitis of both lower extremities 2017    Cellulitis of leg, left 2017    CHF (congestive heart failure), NYHA class III ([facility]) 2013    Chronic back pain     Chronic headaches     was referred to neuro, testing scheduled    Chronic kidney disease     Chronic ulcer of left leg, with fat layer exposed ([address] 2019    COPD (chronic obstructive pulmonary disease) ([facility])     COPD exacerbation condition with hyperosmolarity without coma    Type II or unspecified type diabetes mellitus without mention of complication, not stated as uncontrolled     uncontrolled    Wears glasses     for reading    Wound of left leg 2/22/2019      Past Surgical History:   Procedure Laterality Date    BACK SURGERY   (x 4) 2000,.12/2011.2/2012     Dr Lutz Counts last 2 [address]  04/23/2018    no stenting    COLONOSCOPY  11/3/2015    hemorrhoids, poor prep    CORONARY ANGIOPLASTY WITH STENT PLACEMENT  March 2013    x 1    HAND TENDON SURGERY Left     thumb tendon repair    KNEE ARTHROSCOPY Left     NERVE BLOCK  07-31-15    TENS unit    RI ESOPHAGOGASTRODUODENOSCOPY TRANSORAL DIAGNOSTIC N/A 7/18/2018    EGD ESOPHAGOGASTRODUODENOSCOPY performed by Bambi Ortiz MD at [address] [facility] [place] Bilateral 09/20/2012    Dr Quinton Kirkpatrick  4/13/2013    normal          Admission Meds  No current facility-administered medications on file prior to encounter. Current Outpatient Medications on File Prior to Encounter   Medication Sig Dispense Refill    clindamycin (CLEOCIN) 300 MG capsule Take 1 capsule by mouth 4 times daily for 10 days 40 capsule 0    Lactobacillus (PROBIOTIC ACIDOPHILUS) TABS Take 1 tablet by mouth 2 times daily 60 tablet 0    tiotropium (SPIRIVA RESPIMAT) 2.5 MCG/ACT AERS inhaler Inhale 2 puffs into the lungs daily 4 g 3    albuterol (PROVENTIL) (2.5 MG/3ML) 0.083% nebulizer solution Take 3 mLs by nebulization every 6 hours as needed for Wheezing or Shortness of Breath 120 vial 0    Lancet Devices (LANCING DEVICE) MISC Provide patient with lancing device appropriate for his machine/lancing needles. 1 each 1    oxyCODONE HCl (OXY-IR) 10 MG immediate release tablet Take 1 tablet by mouth every 8 hours as needed for Pain for up to 30 days.  90 tablet 0    pravastatin (PRAVACHOL) 40 MG tablet Take 1 tablet by mouth nightly 90 tablet 3 concentrated injection vial Inject into the skin 3 times daily Indications: 50 in the morning 50 at lunch and 40 at dinner. U-500 insulin--Pt. Takes 50 u at breakfast and lunch and  40 u at supper time.  nitroGLYCERIN (NITROSTAT) 0.4 MG SL tablet Place 1 tablet under the tongue every 5 minutes as needed for Chest pain 25 tablet 3    fluticasone (CUTIVATE) 0.05 % cream Apply topically 2 times daily       fluticasone (FLONASE) 50 MCG/ACT nasal spray 2 sprays by Nasal route daily (Patient taking differently: 2 sprays by Nasal route daily as needed (sinus symptoms) ) 1 Bottle 3    Melatonin 10 MG TABS Take 10 mg by mouth nightly as needed (insomnia) 90 tablet 1    COMFORT ASSIST INSULIN SYRINGE 31G X \" 1 ML MISC       aspirin 81 MG EC tablet Take 81 mg by mouth daily.  Spacer/Aero Chamber Mouthpiece MISC 1 each by Does not apply route once as needed (to be used with his inhalers) 1 each 0           Allergies  Allergies   Allergen Reactions    Levofloxacin Anaphylaxis     Patient reports needing epinephrine \"about 5 or 6 months ago\" for anaphylaxis (itching, hives, SOB/swelling) after receiving Levofloxacin. Previous report from 2012: Nausea/Vomiting    Lorazepam      Falls      Nsaids      CHF&CKD    Vancomycin Other (See Comments)     Itching, SOB, emesis upon infusion in ED 2019. Patient states he has had vancomycin \"a number of times\" before without issue.         Social   Social History     Tobacco Use    Smoking status: Former Smoker     Packs/day: 2.00     Years: 33.00     Pack years: 66.00     Types: Cigarettes     Start date: 1985     Last attempt to quit: 2019     Years since quittin.1    Smokeless tobacco: Former User     Types: Snuff     Quit date: 1995   Substance Use Topics    Alcohol use: No     Alcohol/week: 0.0 oz                Family History   Problem Relation Age of Onset    Heart Disease Mother          age 64 from MI    High Blood Pressure Mother     Diabetes Mother     High Blood Pressure Father          age 80 from CKD and Lung Fibrosis    Kidney Disease Father     Heart Disease Sister     Heart Attack Sister     Obesity Sister     Diabetes Sister           Review of Systems    Other than above 14 systems reviewed were negative . Tolerating antibiotics. Physical Exam  BP (!) 148/63   Pulse 77   Temp 97.1 °F (36.2 °C) (Oral)   Resp 16   Ht 6' (1.829 m)   Wt (!) 408 lb 4.7 oz (185.2 kg)   SpO2 97%   BMI 55.37 kg/m²           General Appearance: alert and oriented to person, place and time, well-developed and well-nourished, in no acute distress  Skin: warm and dry, no rash   Left axilla area of erythema, induration and tenderness with no fluctuation, some satellite lesions. Head: normocephalic and atraumatic  Eyes: pupils equal, round, and reactive to light, extraocular eye movements intact, conjunctivae normal  ENT: hearing grossly normal bilaterally  Neck: neck supple and non tender without mass  Pulmonary/Chest: Decreased to auscultation bilaterally- no wheezes, rales or rhonchi, normal air movement, no respiratory distress  Cardiovascular: normal rate, regular rhythm, normal S1 and S2, no murmurs.   Abdomen: soft, non-tender, non-distended, normal bowel sounds, no masses or organomegaly  Extremities: no cyanosis, clubbing  Bilateral lower extremity venous stasis dermatitis  Musculoskeletal: normal range of motion, no joint swelling, deformity or tenderness  Neurologic: no cranial nerve deficit and muscle strength normal    Data Review:    Recent Labs     19  1156   WBC 9.8   HGB 11.8*   HCT 36.2*   MCV 85.8        Recent Labs     19  1156 19  0531     --    K 4.3  --      --    CO2 24  --    BUN 38* 47*   CREATININE 1.67* 1.80*         Imaging Studies:                           All appropriate imaging studies and reports reviewed: Yes       Us Scrotum And An ultrasound image demonstrating patency of the vein with needle tip located within it. An image was obtained and stored in PACs. A 0.018 guidewire was used to place a peel-a-way sheath and a 5 Vietnamese dual-lumen PICC was advanced with fluoroscopic guidance with the tip at the cavo-atrial junction. The catheter flushed easily and there was a good blood return. The catheter was secured to the skin. The patient tolerated the procedure well and there were no immediate complications. FINDINGS: Fluoroscopic image demonstrates the tip of the catheter at the cavo-atrial junction. Successful ultrasound and fluoroscopy guided PICC placement               Assessment:       Cellulitis due to bacterial infection versus candidiasis to the left axilla    CKD (chronic kidney disease) stage 3, GFR 30-59 ml/min ([facility])    Chronic diastolic congestive heart failure (HCC)    Essential hypertension  Venous stasis dermatitis to the left lower extremity      Recommendations:   Continue IV clindamycin  Discontinue IV doxycycline  Topical clotrimazole to the left axilla  Legs elevation and compression. Thank you for allowing me to participate in the care of your patient. Please feel free to contact me with any questions or concerns.      Carlos Lorenzo MD

## 2019-06-13 NOTE — PROGRESS NOTES
Bedside reporting done, per Hafsa Esposito and [facility] RN's  IV PICC line intact, no redness noted  Pt.  Sits up at side of bed, no c/o of pain, or SOB at this time

## 2019-06-13 NOTE — DISCHARGE INSTR - COC
Administered    Hepatitis B Adult (Engerix-B) 05/29/2019    Influenza Virus Vaccine 12/16/2013, 10/23/2014, 10/12/2015    Influenza, Jin Clayton, 3 yrs and older, IM, PF (Fluzone 3 yrs and older or Afluria 5 yrs and older) 10/11/2016, 11/04/2017, 09/18/2018    Pneumococcal Polysaccharide ([id number]) 05/23/2013    Tdap (Boostrix, Adacel) 09/12/2018       Active Problems:  Patient Active Problem List   Diagnosis Code    DDD (degenerative disc disease), lumbar M51.36    Hypertriglyceridemia E78.1    CKD (chronic kidney disease) stage 3, GFR 30-59 ml/min ([facility]) N18.3    CAD (coronary artery disease) s/p 1 stent I25.10    Mixed type COPD (chronic obstructive pulmonary disease) ([facility] J44.9    Type 2 diabetes mellitus with diabetic polyneuropathy, with long-term current use of insulin ([facility]) E11.42, Z79.4    Chronic pancreatitis ([facility] K86.1    Displacement of lumbar intervertebral disc without myelopathy M51.26    Chronic diastolic congestive heart failure (HCC) I50.32    Insomnia G47.00    BPH (benign prostatic hyperplasia) N40.0    Class 3 obesity due to excess calories with serious comorbidity and body mass index (BMI) of 50.0 to 59.9 in adult [id number]    Essential hypertension I10    Hepatic steatosis K76.0    History of rib fracture S36.78    Umbilical hernia W12.4    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    Spinal stenosis of lumbar region without neurogenic claudication M48.061    Chronic back pain greater than 3 months duration M54.9, G89.29    Gastroesophageal reflux disease without esophagitis K21.9    Hyperlipidemia with target LDL less than 70 E78.5    Lower urinary tract symptoms (LUTS) R39.9    Vitamin D deficiency E55.9    Iron deficiency anemia due to chronic blood loss D50.0    BARBY treated with BiPAP G47.33    Chronic midline low back pain with left-sided sciatica M54.42, G89.29    Stasis dermatitis of both legs I87.2    Anxiety F41.9    Intertrigo axillary b/l L30.4    History of recurrent ear infection Z86.69    Mixed conductive and sensorineural hearing loss of both ears H90.6    Tinnitus of both ears H93.13    Slow transit constipation K59.01    Chronic infection of both external ears H60.63    Bilateral leg edema R60.0    Tinea pedis of both feet B35.3    Onychomycosis B35.1    MDD (major depressive disorder), recurrent severe, without psychosis ([address] F33.2    Hydrocele, bilateral N43.3    Celiac artery stenosis (HCC) I77.4    Myopia H52.10    Nuclear nonsenile cataract H26.9    Presbyopia H52.4    Postlaminectomy syndrome of lumbar region M96.1    Venous insufficiency of left lower extremity I87.2    Bilateral hearing loss H91.93    Cellulitis L03.90       Isolation/Infection:   Isolation          No Isolation            Nurse Assessment:  Last Vital Signs: BP (!) 148/63   Pulse 77   Temp 97.1 °F (36.2 °C) (Oral)   Resp 16   Ht 6' (1.829 m)   Wt (!) 408 lb 4.7 oz (185.2 kg)   SpO2 97%   BMI 55.37 kg/m²     Last documented pain score (0-10 scale): Pain Level: 1  Last Weight:   Wt Readings from Last 1 Encounters:   06/13/19 (!) 408 lb 4.7 oz (185.2 kg)     Mental Status:  oriented    IV Access:  - None    Nursing Mobility/ADLs:  Walking   Independent  Transfer  Independent  Bathing  Independent  Dressing  Independent  Toileting  Independent  Feeding  Independent  Med Admin  Independent  Med Delivery   whole    Wound Care Documentation and Therapy:        Elimination:  Continence:   · Bowel: No  · Bladder: No  Urinary Catheter: None   Colostomy/Ileostomy/Ileal Conduit: No       Intake/Output Summary (Last 24 hours) at 6/13/2019 1420  Last data filed at 6/13/2019 1342  Gross per 24 hour   Intake 575 ml   Output 2875 ml   Net -2300 ml     I/O last 3 completed shifts:  In: -   Out: 700 [Urine:700]    Safety Concerns:      At Risk for Falls    Impairments/Disabilities:      Vision    Nutrition Therapy:  Current Nutrition Therapy:   - Oral Diet:  Carb Control 5 carbs/meal (2000kcals/day)    Routes of Feeding: Oral  Liquids: No Restrictions  Daily Fluid Restriction: no  Last Modified Barium Swallow with Video (Video Swallowing Test): not done    Treatments at the Time of Hospital Discharge:   Respiratory Treatments: See discharge orders  Oxygen Therapy:  Home Oxygen  Ventilator:    - No ventilator support    Rehab Therapies: Physical Therapy  Weight Bearing Status/Restrictions: No weight bearing restirctions  Other Medical Equipment (for information only, NOT a DME order):  wheelchair, cane, walker and hospital bed  Other Treatments: Skilled Nursing Assessment; Medication Education and Monitor    Home health care agency's  to evaluate patient two weeks prior to discharge from home health to determine post-discharge services. Patient's personal belongings (please select all that are sent with patient):  Glasses- reading    RN SIGNATURE:  Electronically signed by Claudette Escalante RN on 6/14/19 at 12:35 PM    CASE MANAGEMENT/SOCIAL WORK SECTION    Inpatient Status Date: 6/12/2019    Readmission Risk Assessment Score:  Readmission Risk              Risk of Unplanned Readmission:        30           Discharging to Facility/ . [id number] [address]  Phone [phone]  Fax  [phone]  Home health care agency's  to evaluate patient two weeks prior to discharge from home health to determine post-discharge services.      / signature: Electronically signed by Claudette Escalante RN on 6/14/19 at 10:05 AM    PHYSICIAN SECTION    Prognosis: Good    Condition at Discharge: Stable    Rehab Potential (if transferring to Rehab): Good    Recommended Labs or Other Treatments After Discharge:     Physician Certification: I certify the above information and transfer of Matthew Randolph  is necessary for the continuing treatment of the diagnosis listed and that he requires [facility] for [facility] 30 days.      Update Admission H&P: No change in H&P    PHYSICIAN SIGNATURE:  SLAVA Suh/Electronically signed by Tiny Valle RN on 6/14/2019 at 2:14 PM

## 2019-06-13 NOTE — PROGRESS NOTES
day on Mon Thu    metoprolol tartrate  50 mg Oral BID    miconazole   Topical BID    pravastatin  40 mg Oral Nightly    pantoprazole  40 mg Oral Nightly    spironolactone  50 mg Oral Daily    varenicline  1 mg Oral BID    tiotropium  2 puff Inhalation Daily    lactobacillus  1 capsule Oral BID WC    heparin (porcine)  5,000 Units Subcutaneous 3 times per day    insulin lispro  0-12 Units Subcutaneous TID WC    insulin lispro  0-6 Units Subcutaneous Nightly     Continuous Infusions:    dextrose       PRN Meds: sodium chloride flush, acetaminophen, albuterol, nitroGLYCERIN, oxyCODONE HCl, glucose, dextrose, glucagon (rDNA), dextrose    Data:     Past Medical History:   has a past medical history of Acute bronchitis with chronic obstructive pulmonary disease (COPD) ([facility]), Acute on chronic kidney failure ([facility]), Acute on chronic respiratory failure ([facility]), Adhesive capsulitis of left shoulder, Anxiety, Arthropathy, unspecified, other specified sites, Asthma, Bilateral lower leg cellulitis, Bilateral lower leg cellulitis, Bilateral lower leg cellulitis, Blood in stool, CAD (coronary artery disease), Cellulitis of both lower extremities, Cellulitis of leg, left, CHF (congestive heart failure), NYHA class III ([facility]), Chronic back pain, Chronic headaches, Chronic kidney disease, Chronic ulcer of left leg, with fat layer exposed ([address] COPD (chronic obstructive pulmonary disease) ([address] COPD exacerbation ([address] Diabetic neuropathy ([address] Displacement of lumbar intervertebral disc without myelopathy, Ear infection, Facial cellulitis, Fall, GERD (gastroesophageal reflux disease), Head injury, Hearing loss in right ear, Hepatic steatosis, History of general anesthesia complication, History of rib fracture, Hyperlipidemia, Hypertension, Insomnia, Intolerance of continuous positive airway pressure (CPAP) ventilation, Mastoiditis of left side, Mixed conductive and sensorineural hearing loss of both ears, Mixed type COPD (chronic obstructive pulmonary disease) ([facility] Moderate recurrent major depression ([facility] Morbid obesity with BMI of 45.0-49.9, adult ([facility] Neuropathy, Obesity, Open wound of groin, BARBY on CPAP, Osteoarthritis, Otitis externa of left ear, Pancreatitis chronic, Renal insufficiency, S/P cardiac cath, Severe depression ([facility] Spinal stenosis of lumbar region without neurogenic claudication, Syncope, Tinnitus of both ears, Type 2 diabetes mellitus with stage 3 chronic kidney disease, with long-term current use of insulin ([facility] Type II or unspecified type diabetes mellitus without mention of complication, not stated as uncontrolled, Wears glasses, and Wound of left leg. Social History:   reports that he quit smoking about 8 weeks ago. His smoking use included cigarettes. He started smoking about 33 years ago. He has a 66.00 pack-year smoking history. He quit smokeless tobacco use about 23 years ago. His smokeless tobacco use included snuff. He reports that he has current or past drug history. Drug: Marijuana. He reports that he does not drink alcohol. Family History:   Family History   Problem Relation Age of Onset    Heart Disease Mother          age 64 from [facility] High Blood Pressure Mother     Diabetes Mother     High Blood Pressure Father          age 80 from CKD and Lung Fibrosis    Kidney Disease Father     Heart Disease Sister     Heart Attack Sister     Obesity Sister     Diabetes Sister        Vitals:  BP (!) 161/71   Pulse 86   Temp 97.8 °F (36.6 °C) (Oral)   Resp 16   Ht 6' (1.829 m)   Wt (!) 407 lb (184.6 kg)   SpO2 95%   BMI 55.20 kg/m²   Temp (24hrs), Av.9 °F (36.6 °C), Min:96.9 °F (36.1 °C), Max:98.7 °F (37.1 °C)    Recent Labs     19  0637   POCGLU 138* 313* 291*       I/O(24Hr):     Intake/Output Summary (Last 24 hours) at 2019 1055  Last data filed at 2019 0202  Gross per 24 hour   Intake --   Output 700 ml   Net -700 ml       Labs:    [unfilled]    Lab Results   Component Value Date/Time    SPECIAL NOT REPORTED 02/22/2019 09:00 AM     Lab Results   Component Value Date/Time    CULTURE (A) 02/22/2019 09:00 AM     STREPTOCOCCI, BETA HEMOLYTIC GROUP G  MODERATE GROWTH      CULTURE DIPHTHEROIDS  MODERATE GROWTH   (A) 02/22/2019 09:00 AM    CULTURE NO ANAEROBIC ORGANISMS ISOLATED  AT 5 DAYS   (A) 02/22/2019 09:00 AM       [unfilled]    Radiology:    Us Scrotum And Testicles    Result Date: 5/31/2019  EXAMINATION: ULTRASOUND OF THE SCROTUM/TESTICLES WITH COLOR DOPPLER FLOW EVALUATION 5/31/2019 COMPARISON: None. HISTORY: ORDERING SYSTEM PROVIDED HISTORY: Hydrocele, bilateral FINDINGS: Measurements: Right testicle: 4.9 x 2.5 x 2.8 cm Left testicle: 4.8 x 3.9 x 2.2 cm Right: Grey scale: The right testicle demonstrates normal homogeneous echotexture without focal lesion. No evidence of testicular microlithiasis. Doppler Evaluation:  There is normal arterial and venous Doppler flow within the testicle. Scrotal Sac:  Large hydrocele. Tiny echogenic focus along the tunica. Epididymis:  Not readily visualized sonographically Left: Grey scale: The left testicle demonstrates normal homogeneous echotexture without focal lesion. No evidence of testicular microlithiasis. Doppler Evaluation:  There is normal arterial and venous Doppler flow within the testicle. Scrotal Sac:  Large hydrocele. Epididymis:  Not readily visualized sonographically     Large bilateral hydroceles. Ir Ibeth Walker Device Plmt/replace/removal    Result Date: 6/13/2019  PROCEDURE: ULTRASOUND GUIDED VASCULAR ACCESS. FLUOROSCOPY GUIDED PICC PLACEMENT 6/13/2019.  HISTORY: ORDERING SYSTEM PROVIDED HISTORY: antibiotic therapy TECHNOLOGIST PROVIDED HISTORY: For IV antibiotic therapy Please insert double lumen  PICC line Reason for exam:->antibiotic therapy Lumen?->Double Lumen Cellulitis SEDATION: None FLUOROSCOPY DOSE AND TYPE OR TIME AND EXPOSURES: 12 seconds; D  TECHNIQUE: This procedure was performed by Dr. Sundar Frausto. Informed consent was obtained after a detailed explanation of the procedure including risks, benefits, and alternatives. Universal protocol was observed. The right arm was prepped and draped in sterile fashion using maximum sterile barrier technique. Local anesthesia was achieved with lidocaine. A micropuncture needle was used to access the right basilic vein using ultrasound guidance. An ultrasound image demonstrating patency of the vein with needle tip located within it. An image was obtained and stored in PACs. A 0.018 guidewire was used to place a peel-a-way sheath and a 5 [place]anese dual-lumen PICC was advanced with fluoroscopic guidance with the tip at the cavo-atrial junction. The catheter flushed easily and there was a good blood return. The catheter was secured to the skin. The patient tolerated the procedure well and there were no immediate complications. FINDINGS: Fluoroscopic image demonstrates the tip of the catheter at the cavo-atrial junction. Successful ultrasound and fluoroscopy guided PICC placement         Physical Examination:        Physical Exam   Constitutional: He is oriented to person, place, and time. He appears well-developed and well-nourished. No distress. Cardiovascular: Normal rate and regular rhythm. No murmur heard. Pulmonary/Chest: Effort normal. He has decreased breath sounds. He has no wheezes. Abdominal: Soft. Bowel sounds are normal. There is no tenderness. Musculoskeletal:   Walks with cane   Neurological: He is alert and oriented to person, place, and time. Skin: Rash noted. There is erythema.               Assessment:        Primary Problem  Cellulitis    Active Hospital Problems    Diagnosis Date Noted    Cellulitis [L03.90] 06/12/2019    Essential hypertension [I10]     Chronic diastolic congestive heart failure (HCC) [I50.32] 08/14/2013    CKD (chronic kidney disease) stage 3, GFR 30-59 ml/min ([facility] [N18.3] 05/08/2013       Plan:        Left axillary cellulitis  Clindamycin, doxycycline  Consult infectious diseases, conditions appreciated  Pain control PRN  Possible reaction from IV vancomycin  PICC consult for midline placement    DM II  Resume home insulin  SSI coverage     Resume home hypertensive medications     COPD  Home medication regiment     Heparin for VTE prophylaxis  Protonix for GI prophylaxis        Mary Chatman MD  6/13/2019  10:55 AM     Attending Physician Statement  I Independently seen and examined the patient. I have discussed the care of the patient, including pertinent history and exam findings,  with the resident. I have reviewed the key elements of all parts of the encounter with the resident. I agree with the assessment, plan and orders as documented by the resident.     Donald Suh

## 2019-06-13 NOTE — PLAN OF CARE
Problem: Falls - Risk of:  Goal: Will remain free from falls  Description  Will remain free from falls  6/13/2019 0405 by Brit Parry RN  Outcome: Ongoing  Note:   Patient remained free from falls this shift. Call light and bed side table are within reach, bed is in lowest position, and side rails are up x2. Will continue to monitor.    6/12/2019 1826 by Laureen Harris RN  Outcome: Ongoing  Goal: Absence of physical injury  Description  Absence of physical injury  6/13/2019 0405 by Brit Parry RN  Outcome: Ongoing  6/12/2019 1826 by Laureen Harris RN  Outcome: Ongoing     Problem: Tissue Perfusion - Peripheral, Altered:  Goal: Circulatory function within specified parameters  Description  Circulatory function within specified parameters  6/13/2019 0405 by Brit Parry RN  Outcome: Ongoing  6/12/2019 1826 by Laureen Harris RN  Outcome: Ongoing

## 2019-06-13 NOTE — CARE COORDINATION
CASE MANAGEMENT NOTE:    Admission Date:  6/12/2019 Chel Victoria is a 46 y.o.  male    Admitted for : Cellulitis [L03.90]    Met with:  Patient    PCP:  Dr. Dave Limon:  Autumn Hu Dual      Current Residence/ Living Arrangements:  independently at home with spouse and family             Current Services PTA:  No    Is patient agreeable to VNS: Yes    Freedom of choice provided: Yes    List of [address] provided: Yes    [facility] chosen:  [facility]    DME:  straight cane, walker, wheelchair, hospital bed, BP cuff, rollator, glucometer    Home Oxygen: No    Nebulizer: No    CPAP/BIPAP: No    Supplier: N/A    Potential Assistance Needed: may need [facility]    SNF needed: No    Pharmacy:  [facility] [address]       Is the Patient an [employer] with Readmission Risk Score greater than 14%? No  If yes, pt needs a follow up appointment made within 7 days. Does Patient want to use MEDS to BEDS? No    Family Members/Caregivers that pt would like involved in their care:    No    If yes, list name here:  n/a    Transportation Provider:  Patient and Family             Is patient in Isolation/One on One/Altered Mental Status? No  If yes, skip next question. If no, would they like an I-Pad to  use? No  If yes, call [phone].              Discharge Plan:  Home with [facility]                 Electronically signed by: Bill Oates RN on 6/13/2019 at 2:18 PM

## 2019-06-14 VITALS
BODY MASS INDEX: 42.66 KG/M2 | WEIGHT: 315 LBS | RESPIRATION RATE: 16 BRPM | DIASTOLIC BLOOD PRESSURE: 65 MMHG | HEIGHT: 72 IN | OXYGEN SATURATION: 97 % | SYSTOLIC BLOOD PRESSURE: 143 MMHG | TEMPERATURE: 97.7 F | HEART RATE: 77 BPM

## 2019-06-14 LAB
ABSOLUTE EOS #: 0.2 K/UL (ref 0–0.4)
ABSOLUTE IMMATURE GRANULOCYTE: ABNORMAL K/UL (ref 0–0.3)
ABSOLUTE LYMPH #: 1.7 K/UL (ref 1–4.8)
ABSOLUTE MONO #: 0.8 K/UL (ref 0.1–1.3)
ANION GAP SERPL CALCULATED.3IONS-SCNC: 18 MMOL/L (ref 9–17)
BASOPHILS # BLD: 1 % (ref 0–2)
BASOPHILS ABSOLUTE: 0.1 K/UL (ref 0–0.2)
BUN BLDV-MCNC: 61 MG/DL (ref 6–20)
BUN/CREAT BLD: ABNORMAL (ref 9–20)
CALCIUM SERPL-MCNC: 9.9 MG/DL (ref 8.6–10.4)
CHLORIDE BLD-SCNC: 92 MMOL/L (ref 98–107)
CO2: 24 MMOL/L (ref 20–31)
CREAT SERPL-MCNC: 1.98 MG/DL (ref 0.7–1.2)
DIFFERENTIAL TYPE: ABNORMAL
EOSINOPHILS RELATIVE PERCENT: 2 % (ref 0–4)
GFR AFRICAN AMERICAN: 43 ML/MIN
GFR NON-AFRICAN AMERICAN: 35 ML/MIN
GFR SERPL CREATININE-BSD FRML MDRD: ABNORMAL ML/MIN/{1.73_M2}
GFR SERPL CREATININE-BSD FRML MDRD: ABNORMAL ML/MIN/{1.73_M2}
GLUCOSE BLD-MCNC: 219 MG/DL (ref 75–110)
GLUCOSE BLD-MCNC: 234 MG/DL (ref 70–99)
GLUCOSE BLD-MCNC: 331 MG/DL (ref 75–110)
HCT VFR BLD CALC: 38 % (ref 41–53)
HEMOGLOBIN: 12.5 G/DL (ref 13.5–17.5)
IMMATURE GRANULOCYTES: ABNORMAL %
LYMPHOCYTES # BLD: 15 % (ref 24–44)
MCH RBC QN AUTO: 28.1 PG (ref 26–34)
MCHC RBC AUTO-ENTMCNC: 32.8 G/DL (ref 31–37)
MCV RBC AUTO: 85.6 FL (ref 80–100)
MONOCYTES # BLD: 7 % (ref 1–7)
NRBC AUTOMATED: ABNORMAL PER 100 WBC
PDW BLD-RTO: 16.6 % (ref 11.5–14.9)
PLATELET # BLD: 217 K/UL (ref 150–450)
PLATELET ESTIMATE: ABNORMAL
PMV BLD AUTO: 9.2 FL (ref 6–12)
POTASSIUM SERPL-SCNC: 4.3 MMOL/L (ref 3.7–5.3)
RBC # BLD: 4.44 M/UL (ref 4.5–5.9)
RBC # BLD: ABNORMAL 10*6/UL
SEG NEUTROPHILS: 75 % (ref 36–66)
SEGMENTED NEUTROPHILS ABSOLUTE COUNT: 8.5 K/UL (ref 1.3–9.1)
SODIUM BLD-SCNC: 134 MMOL/L (ref 135–144)
WBC # BLD: 11.3 K/UL (ref 3.5–11)
WBC # BLD: ABNORMAL 10*3/UL

## 2019-06-14 PROCEDURE — 80048 BASIC METABOLIC PNL TOTAL CA: CPT

## 2019-06-14 PROCEDURE — 82947 ASSAY GLUCOSE BLOOD QUANT: CPT

## 2019-06-14 PROCEDURE — 6370000000 HC RX 637 (ALT 250 FOR IP): Performed by: INTERNAL MEDICINE

## 2019-06-14 PROCEDURE — 85025 COMPLETE CBC W/AUTO DIFF WBC: CPT

## 2019-06-14 PROCEDURE — 2500000003 HC RX 250 WO HCPCS: Performed by: INTERNAL MEDICINE

## 2019-06-14 PROCEDURE — 6370000000 HC RX 637 (ALT 250 FOR IP): Performed by: STUDENT IN AN ORGANIZED HEALTH CARE EDUCATION/TRAINING PROGRAM

## 2019-06-14 PROCEDURE — 6360000002 HC RX W HCPCS: Performed by: STUDENT IN AN ORGANIZED HEALTH CARE EDUCATION/TRAINING PROGRAM

## 2019-06-14 PROCEDURE — 99239 HOSP IP/OBS DSCHRG MGMT >30: CPT | Performed by: INTERNAL MEDICINE

## 2019-06-14 PROCEDURE — 2580000003 HC RX 258: Performed by: INTERNAL MEDICINE

## 2019-06-14 PROCEDURE — 99232 SBSQ HOSP IP/OBS MODERATE 35: CPT | Performed by: INTERNAL MEDICINE

## 2019-06-14 PROCEDURE — 2580000003 HC RX 258: Performed by: STUDENT IN AN ORGANIZED HEALTH CARE EDUCATION/TRAINING PROGRAM

## 2019-06-14 RX ORDER — 0.9 % SODIUM CHLORIDE 0.9 %
500 INTRAVENOUS SOLUTION INTRAVENOUS ONCE
Status: COMPLETED | OUTPATIENT
Start: 2019-06-14 | End: 2019-06-14

## 2019-06-14 RX ORDER — CLOTRIMAZOLE 1 %
CREAM (GRAM) TOPICAL
Qty: 1 TUBE | Refills: 1 | Status: SHIPPED | OUTPATIENT
Start: 2019-06-14 | End: 2019-06-21

## 2019-06-14 RX ORDER — CLOTRIMAZOLE 1 %
CREAM (GRAM) TOPICAL 2 TIMES DAILY
Status: DISCONTINUED | OUTPATIENT
Start: 2019-06-14 | End: 2019-06-14 | Stop reason: HOSPADM

## 2019-06-14 RX ORDER — CLINDAMYCIN HYDROCHLORIDE 300 MG/1
300 CAPSULE ORAL 3 TIMES DAILY
Qty: 21 CAPSULE | Refills: 0 | Status: SHIPPED | OUTPATIENT
Start: 2019-06-14 | End: 2019-06-16 | Stop reason: SDUPTHER

## 2019-06-14 RX ADMIN — CLOPIDOGREL BISULFATE 75 MG: 75 TABLET, FILM COATED ORAL at 08:27

## 2019-06-14 RX ADMIN — SODIUM CHLORIDE 500 ML: 9 INJECTION, SOLUTION INTRAVENOUS at 12:26

## 2019-06-14 RX ADMIN — CLOTRIMAZOLE: 10 CREAM TOPICAL at 12:31

## 2019-06-14 RX ADMIN — TIOTROPIUM BROMIDE INHALATION SPRAY 2 PUFF: 3.12 SPRAY, METERED RESPIRATORY (INHALATION) at 14:04

## 2019-06-14 RX ADMIN — DOXYCYCLINE 100 MG: 100 INJECTION, POWDER, LYOPHILIZED, FOR SOLUTION INTRAVENOUS at 01:47

## 2019-06-14 RX ADMIN — Medication 1 CAPSULE: at 08:26

## 2019-06-14 RX ADMIN — FERROUS SULFATE TAB 325 MG (65 MG ELEMENTAL FE) 325 MG: 325 (65 FE) TAB at 08:29

## 2019-06-14 RX ADMIN — METOPROLOL TARTRATE 50 MG: 50 TABLET ORAL at 08:28

## 2019-06-14 RX ADMIN — BUMETANIDE 3 MG: 1 TABLET ORAL at 08:26

## 2019-06-14 RX ADMIN — ASPIRIN 81 MG: 81 TABLET, COATED ORAL at 08:27

## 2019-06-14 RX ADMIN — LISINOPRIL 5 MG: 5 TABLET ORAL at 08:28

## 2019-06-14 RX ADMIN — HEPARIN SODIUM 5000 UNITS: 5000 INJECTION INTRAVENOUS; SUBCUTANEOUS at 14:03

## 2019-06-14 RX ADMIN — MOMETASONE FUROATE AND FORMOTEROL FUMARATE DIHYDRATE 2 PUFF: 200; 5 AEROSOL RESPIRATORY (INHALATION) at 08:28

## 2019-06-14 RX ADMIN — CLINDAMYCIN PHOSPHATE 600 MG: 150 INJECTION, SOLUTION, CONCENTRATE INTRAVENOUS at 08:37

## 2019-06-14 RX ADMIN — VARENICLINE TARTRATE 1 MG: 1 TABLET, FILM COATED ORAL at 08:27

## 2019-06-14 RX ADMIN — MICONAZOLE NITRATE: 20.6 POWDER TOPICAL at 08:28

## 2019-06-14 RX ADMIN — FLUOCINONIDE: 0.5 CREAM TOPICAL at 08:29

## 2019-06-14 RX ADMIN — SPIRONOLACTONE 50 MG: 25 TABLET, FILM COATED ORAL at 08:27

## 2019-06-14 RX ADMIN — OXYCODONE HYDROCHLORIDE 10 MG: 10 TABLET ORAL at 05:37

## 2019-06-14 RX ADMIN — Medication 10 ML: at 08:29

## 2019-06-14 RX ADMIN — INSULIN HUMAN 50 UNITS: 500 INJECTION, SOLUTION SUBCUTANEOUS at 11:05

## 2019-06-14 ASSESSMENT — ENCOUNTER SYMPTOMS
DIARRHEA: 0
ABDOMINAL PAIN: 0
WHEEZING: 0
COUGH: 0
SHORTNESS OF BREATH: 0
COLOR CHANGE: 1

## 2019-06-14 ASSESSMENT — PAIN SCALES - GENERAL
PAINLEVEL_OUTOF10: 0
PAINLEVEL_OUTOF10: 7

## 2019-06-14 NOTE — PROGRESS NOTES
Infectious disease Consult Note      Patient: Rhea Nagel. : 1965  Acct#:  [id number]     Date:  2019    Subjective:       History of Present Illness  Patient is a 46 y.o.  male admitted with Cellulitis [L03.90] who is seen in consult for the same. He presented with left axilla swelling, redness, and pain worsening over 2 weeks    Symptoms associated with subjective fever, chills and fatigue. Symptoms are aggravated by arm movement. Symptoms are alleviated by rest.    He had tachycardia on admission. History of chronic kidney disease and reported COPD with chronic shortness of breath, hypertension. Has had diabetes, diabetic neuropathy, lower extremity cellulitis past.  Large bilateral hydroceles on recent scrotum ultrasound. History of Levaquin allergy with reported anaphylaxis. Reported itching, vomiting and shortness of breath during IV vancomycin infusion at the ER yesterday. PICC line was placed    Interval history:  He is feeling better today, less swelling, redness and pain to the left axilla, he is hungry with good appetite, denied any other complaints.   Past Medical History:   Diagnosis Date    Acute bronchitis with chronic obstructive pulmonary disease (COPD) ([address]     Acute on chronic kidney failure ([address] 2017    Acute on chronic respiratory failure ([address] 10/2/2018    Adhesive capsulitis of left shoulder 3/25/2017    Anxiety 10/2/2016    Arthropathy, unspecified, other specified sites 2013    Asthma     Bilateral lower leg cellulitis 2016    Bilateral lower leg cellulitis 2017    Bilateral lower leg cellulitis 2016    Blood in stool     CAD (coronary artery disease)     Cellulitis of both lower extremities 2017    Cellulitis of leg, left 2017    CHF (congestive heart failure), NYHA class III ([address] 2013    Chronic back pain     Chronic headaches     was referred to neuro, testing scheduled    Chronic kidney disease     Chronic ulcer of left leg, with fat layer exposed ([address] 2/22/2019    COPD [facility]ch[facility] obstructive [facility] disease) [facility]     COPD exacerbation (HCC) 11/2/2016    Diabetic neuropathy (HCC) 8/14/2013    Displacement of lumbar intervertebral disc without myelopathy 6/13/2013    Ear infection     RIGHT    Facial cellulitis 2012    Fall 3/25/2017    GERD (gastroesophageal reflux disease)     Head injury     Hearing loss in right ear     pencil pierced ear as a child    Hepatic steatosis 12/3/2015    History of general anesthesia complication     has woke up during surgery under anesthesia    History of rib fracture 12/3/2015    Chronic     Hyperlipidemia     Hypertension     Insomnia     Intolerance of continuous positive airway pressure (CPAP) ventilation 7/20/2017    Mastoiditis of left side     Mixed conductive and sensorineural hearing loss of both ears 1/10/2017    Per ENT    Mixed type COPD (chronic obstructive pulmonary disease) ([address]     Moderate recurrent major depression ([address] 10/2/2016    Morbid obesity with BMI of 45.0-49.9, adult ([address] 6/16/2015    Neuropathy     Obesity     (BMI 35.0-39.9 without comorbidity)    Open wound of groin 12/19/2018    BARBY on CPAP     Osteoarthritis     Otitis externa of left ear     Pancreatitis chronic     Renal insufficiency     proteinuria    S/P cardiac cath 04/23/2018    Severe depression ([address] 9/25/2013    Spinal stenosis of lumbar region without neurogenic claudication 1/6/2016    MRI lumbar 12/30/15 L3-L4: There is broad-based bulging disc which appears protruding left laterally causing flattening of the ventral thecal sac. In addition, there is facet arthropathy with mild hypertrophic changes.  There is borderline central canal stenosis with  evidence of moderate left neural foraminal narrowing and mild right neural foramina narrowing.   L4-L5: There is broad-based protrud    Syncope 4/28/2017    Tinnitus of both ears 1/10/2017    Per ENT    Type 2 diabetes mellitus with stage 3 chronic kidney disease, with long-term current use of insulin [facility] 12/26/2016    due to underlying condition with hyperosmolarity without coma    Type II or unspecified type diabetes mellitus without mention of complication, not stated as uncontrolled     uncontrolled    Wears glasses     for reading    Wound of left leg 2/22/2019      Past Surgical History:   Procedure Laterality Date    BACK SURGERY   (x 4) 2000,.12/2011.2/2012     Dr Kalani Gardner last 2 Travis Doran  04/23/2018    no stenting    COLONOSCOPY  11/3/2015    hemorrhoids, poor prep    CORONARY ANGIOPLASTY WITH STENT PLACEMENT  March 2013    x 1    HAND TENDON SURGERY Left     thumb tendon repair    KNEE ARTHROSCOPY Left     NERVE BLOCK  07-31-15    TENS unit    UT ESOPHAGOGASTRODUODENOSCOPY TRANSORAL DIAGNOSTIC N/A 7/18/2018    EGD ESOPHAGOGASTRODUODENOSCOPY performed by Nayeli Luque MD at [address] [place] [address] Bilateral 09/20/2012    Dr Harshil Canales  4/13/2013    normal          Admission Meds  No current facility-administered medications on file prior to encounter. Current Outpatient Medications on File Prior to Encounter   Medication Sig Dispense Refill    clindamycin (CLEOCIN) 300 MG capsule Take 1 capsule by mouth 4 times daily for 10 days 40 capsule 0    Lactobacillus (PROBIOTIC ACIDOPHILUS) TABS Take 1 tablet by mouth 2 times daily 60 tablet 0    tiotropium (SPIRIVA RESPIMAT) 2.5 MCG/ACT AERS inhaler Inhale 2 puffs into the lungs daily 4 g 3    albuterol (PROVENTIL) (2.5 MG/3ML) 0.083% nebulizer solution Take 3 mLs by nebulization every 6 hours as needed for Wheezing or Shortness of Breath 120 vial 0    Lancet Devices (LANCING DEVICE) MISC Provide patient with lancing device appropriate for his machine/lancing needles.  1 each 1    oxyCODONE HCl (OXY-IR) 10 MG immediate release tablet Take 1 Alcohol/week: 0.0 oz                Family History   Problem Relation Age of Onset    Heart Disease Mother          age 64 from MI   Rod Morales High Blood Pressure Mother     Diabetes Mother     High Blood Pressure Father          age 80 from CKD and Lung Fibrosis    Kidney Disease Father     Heart Disease Sister     Heart Attack Sister     Obesity Sister     Diabetes Sister           Review of Systems    Other than above 12 systems reviewed were negative . Tolerating antibiotics. Physical Exam  BP (!) 136/50   Pulse 78   Temp 97.6 °F (36.4 °C) (Oral)   Resp 16   Ht 6' (1.829 m)   Wt (!) 408 lb 4.7 oz (185.2 kg)   SpO2 94%   BMI 55.37 kg/m²           General Appearance: alert and oriented to person, place and time, well-developed and well-nourished, in no acute distress  Skin: warm and dry, no rash   Left axilla area of erythema, induration and tenderness improved with no fluctuation, some satellite lesions. Head: normocephalic and atraumatic  Eyes: pupils equal, round, and reactive to light, extraocular eye movements intact, conjunctivae normal  ENT: hearing grossly normal bilaterally  Neck: neck supple and non tender without mass  Pulmonary/Chest: Decreased to auscultation bilaterally- no wheezes, rales or rhonchi, normal air movement, no respiratory distress  Cardiovascular: normal rate, regular rhythm, normal S1 and S2, no murmurs.   Abdomen: soft, non-tender, non-distended, normal bowel sounds, no masses or organomegaly  Extremities: no cyanosis, clubbing  Bilateral lower extremity venous stasis dermatitis  Musculoskeletal: normal range of motion, no joint swelling, deformity or tenderness  Neurologic: no cranial nerve deficit and muscle strength normal    Data Review:    Recent Labs     19  1156 19  0549   WBC 9.8 11.3*   HGB 11.8* 12.5*   HCT 36.2* 38.0*   MCV 85.8 85.6    217     Recent Labs     19  1156 19  0531 19  0549     -- 134*   K 4.3  --  4.3     --  92*   CO2 24  --  24   BUN 38* 47* 61*   CREATININE 1.67* 1.80* 1.98*         Imaging Studies:                           All appropriate imaging studies and reports reviewed: Yes       Us Scrotum And Testicles    Result Date: 5/31/2019  EXAMINATION: ULTRASOUND OF THE SCROTUM/TESTICLES WITH COLOR DOPPLER FLOW EVALUATION 5/31/2019 COMPARISON: None. HISTORY: ORDERING SYSTEM PROVIDED HISTORY: Hydrocele, bilateral FINDINGS: Measurements: Right testicle: 4.9 x 2.5 x 2.8 cm Left testicle: 4.8 x 3.9 x 2.2 cm Right: Grey scale: The right testicle demonstrates normal homogeneous echotexture without focal lesion. No evidence of testicular microlithiasis. Doppler Evaluation:  There is normal arterial and venous Doppler flow within the testicle. Scrotal Sac:  Large hydrocele. Tiny echogenic focus along the tunica. Epididymis:  Not readily visualized sonographically Left: Grey scale: The left testicle demonstrates normal homogeneous echotexture without focal lesion. No evidence of testicular microlithiasis. Doppler Evaluation:  There is normal arterial and venous Doppler flow within the testicle. Scrotal Sac:  Large hydrocele. Epididymis:  Not readily visualized sonographically     Large bilateral hydroceles. Ir Jessie GriffinHeyworth Device Plmt/replace/removal    Result Date: 6/13/2019  PROCEDURE: ULTRASOUND GUIDED VASCULAR ACCESS. FLUOROSCOPY GUIDED PICC PLACEMENT 6/13/2019. HISTORY: ORDERING SYSTEM PROVIDED HISTORY: antibiotic therapy TECHNOLOGIST PROVIDED HISTORY: For IV antibiotic therapy Please insert double lumen  PICC line Reason for exam:->antibiotic therapy Lumen?->Double Lumen Cellulitis SEDATION: None FLUOROSCOPY DOSE AND TYPE OR TIME AND EXPOSURES: 12 seconds; D  TECHNIQUE: This procedure was performed by Dr. Park Perez. Informed consent was obtained after a detailed explanation of the procedure including risks, benefits, and alternatives. Universal protocol was observed. The right arm was prepped and draped in sterile fashion using maximum sterile barrier technique. Local anesthesia was achieved with lidocaine. A micropuncture needle was used to access the right basilic vein using ultrasound guidance. An ultrasound image demonstrating patency of the vein with needle tip located within it. An image was obtained and stored in PACs. A 0.018 guidewire was used to place a peel-a-way sheath and a 5 Wolof dual-lumen PICC was advanced with fluoroscopic guidance with the tip at the cavo-atrial junction. The catheter flushed easily and there was a good blood return. The catheter was secured to the skin. The patient tolerated the procedure well and there were no immediate complications. FINDINGS: Fluoroscopic image demonstrates the tip of the catheter at the cavo-atrial junction. Successful ultrasound and fluoroscopy guided PICC placement               Assessment:       Cellulitis due to bacterial infection versus candidiasis to the left axilla    CKD (chronic kidney disease) stage 3, GFR 30-59 ml/min ([facility])    Chronic diastolic congestive heart failure (HCC)    Essential hypertension  Venous stasis dermatitis to the left lower extremity      Recommendations:    IV clindamycin may change to oral 300 mg 3 times a day on discharge for 1 week. Topical clotrimazole to the left axilla  Legs elevation and compression. The patient may be discharged from infectious disease point of view    Thank you for allowing me to participate in the care of your patient. Please feel free to contact me with any questions or concerns.      Alia Lozano MD

## 2019-06-14 NOTE — DISCHARGE SUMMARY
[address]    Discharge Summary     Patient ID: Amarilis Ko :  1965   MRN: [id number]     ACCOUNT:  [de-identified]   Patient's PCP: Linnette Sosa MD  Admit Date: 2019   Discharge Date: 2019     Length of Stay: 2  Code Status:  Full Code  Admitting Physician: Vidhya Sandra MD  Discharge Physician: Zenon Ortega MD     Active Discharge Diagnoses:       Primary Problem  Cellulitis      Hospital Problems  Active Hospital Problems    Diagnosis Date Noted    Cellulitis [L03.90] 2019    Essential hypertension [I10]     Chronic diastolic congestive heart failure (HCC) [I50.32] 2013    CKD (chronic kidney disease) stage 3, GFR 30-59 ml/min ([address] [N18.3] 2013       Admission Condition:  fair     Discharged Condition: good    Hospital Stay:       Hospital Course:  Amarilis Ko is a 46 y.o. male who was admitted for the management of   Cellulitis , presented to ER with Cellulitis (L axilla)  Pt admitted after having failed outpt therapy on oral doxy. Given IV doxycycline and clindamycin. Patient had immediate significant improvement in symptoms. Patient had no other complaints. After 2 days, area of infection had resolution of inflammation.     Significant therapeutic interventions: IV ABX    Significant Diagnostic Studies:   Labs / Micro:  CBC:   Lab Results   Component Value Date    WBC 11.3 2019    RBC 4.44 2019    HGB 12.5 2019    HCT 38.0 2019    MCV 85.6 2019    MCH 28.1 2019    MCHC 32.8 2019    RDW 16.6 2019     2019     BMP:    Lab Results   Component Value Date    GLUCOSE 234 2019     2019    K 4.3 2019    CL 92 2019    CO2 24 2019    ANIONGAP 18 2019    BUN 61 2019    CREATININE 1.98 2019    BUNCRER NOT REPORTED 2019    CALCIUM 9.9 2019    LABGLOM 35 2019 GFRAA 43 06/14/2019    GFR      06/14/2019    GFR NOT REPORTED 06/14/2019       ,   ,     Radiology:    Us Scrotum And Testicles    Result Date: 5/31/2019  EXAMINATION: ULTRASOUND OF THE SCROTUM/TESTICLES WITH COLOR DOPPLER FLOW EVALUATION 5/31/2019 COMPARISON: None. HISTORY: ORDERING SYSTEM PROVIDED HISTORY: Hydrocele, bilateral FINDINGS: Measurements: Right testicle: 4.9 x 2.5 x 2.8 cm Left testicle: 4.8 x 3.9 x 2.2 cm Right: Grey scale: The right testicle demonstrates normal homogeneous echotexture without focal lesion. No evidence of testicular microlithiasis. Doppler Evaluation:  There is normal arterial and venous Doppler flow within the testicle. Scrotal Sac:  Large hydrocele. Tiny echogenic focus along the tunica. Epididymis:  Not readily visualized sonographically Left: Grey scale: The left testicle demonstrates normal homogeneous echotexture without focal lesion. No evidence of testicular microlithiasis. Doppler Evaluation:  There is normal arterial and venous Doppler flow within the testicle. Scrotal Sac:  Large hydrocele. Epididymis:  Not readily visualized sonographically     Large bilateral hydroceles. Ir Mittie [place] Device Plmt/replace/removal    Result Date: 6/13/2019  PROCEDURE: ULTRASOUND GUIDED VASCULAR ACCESS. FLUOROSCOPY GUIDED PICC PLACEMENT 6/13/2019. HISTORY: ORDERING SYSTEM PROVIDED HISTORY: antibiotic therapy TECHNOLOGIST PROVIDED HISTORY: For IV antibiotic therapy Please insert double lumen  PICC line Reason for exam:->antibiotic therapy Lumen?->Double Lumen Cellulitis SEDATION: None FLUOROSCOPY DOSE AND TYPE OR TIME AND EXPOSURES: 12 seconds; D  TECHNIQUE: This procedure was performed by Dr. Josefina Tolliver. Informed consent was obtained after a detailed explanation of the procedure including risks, benefits, and alternatives. Universal protocol was observed. The right arm was prepped and draped in sterile fashion using maximum sterile barrier technique.  Local anesthesia was achieved with lidocaine. A micropuncture needle was used to access the right basilic vein using ultrasound guidance. An ultrasound image demonstrating patency of the vein with needle tip located within it. An image was obtained and stored in PACs. A 0.018 guidewire was used to place a peel-a-way sheath and a 5 Portuguese dual-lumen PICC was advanced with fluoroscopic guidance with the tip at the cavo-atrial junction. The catheter flushed easily and there was a good blood return. The catheter was secured to the skin. The patient tolerated the procedure well and there were no immediate complications. FINDINGS: Fluoroscopic image demonstrates the tip of the catheter at the cavo-atrial junction. Successful ultrasound and fluoroscopy guided PICC placement         Consultations:    Consults:     Final Specialist Recommendations/Findings:   IP CONSULT TO PRIMARY CARE PROVIDER  IP CONSULT TO INFECTIOUS DISEASES      The patient was seen and examined on day of discharge and this discharge summary is in conjunction with any daily progress note from day of discharge. Discharge plan:       Disposition: Home    Physician Follow Up:     Trisha Clifford MD  [address] [place] [address]  [phone]             Requiring Further Evaluation/Follow Up POST HOSPITALIZATION/Incidental Findings: Cellulitis    Diet: cardiac diet    Activity: As tolerated    Instructions to Patient: Follow up with physician    Discharge Medications:      Medication List      START taking these medications    clotrimazole 1 % cream  Commonly known as:  LOTRIMIN  Apply topically 2 times daily.         CHANGE how you take these medications    clindamycin 300 MG capsule  Commonly known as:  CLEOCIN  Take 1 capsule by mouth 3 times daily for 7 days  What changed:  when to take this     fluticasone 50 MCG/ACT nasal spray  Commonly known as:  FLONASE  2 sprays by Nasal route daily  What changed:    · when to take this  · reasons to by mouth every 8 hours as needed for Pain for up to 30 days. pantoprazole 40 MG tablet  Commonly known as:  PROTONIX  Take 1 tablet by mouth nightly     pravastatin 40 MG tablet  Commonly known as:  PRAVACHOL  Take 1 tablet by mouth nightly     * PROAIR  (90 Base) MCG/ACT inhaler  Generic drug:  albuterol sulfate HFA  INHALE TWO PUFFS BY MOUTH EVERY 6 HOURS AS NEEDED FOR WHEEZING OR SHORTNESS OF BREATH     * albuterol (2.5 MG/3ML) 0.083% nebulizer solution  Commonly known as:  PROVENTIL  Take 3 mLs by nebulization every 6 hours as needed for Wheezing or Shortness of Breath     PROBIOTIC ACIDOPHILUS Tabs  Take 1 tablet by mouth 2 times daily     Spacer/Aero Chamber Mouthpiece Misc  1 each by Does not apply route once as needed (to be used with his inhalers)     spironolactone 50 MG tablet  Commonly known as:  ALDACTONE  Take 1 tablet by mouth daily     tiotropium 2.5 MCG/ACT Aers inhaler  Commonly known as:  SPIRIVA RESPIMAT  Inhale 2 puffs into the lungs daily     tiZANidine 4 MG tablet  Commonly known as:  ZANAFLEX  TAKE ONE TABLET BY MOUTH EVERY 8 HOURS AS NEEDED FOR BACK PAIN     varenicline 1 MG tablet  Commonly known as:  CHANTIX  Take 1 tablet by mouth 2 times daily     venlafaxine 100 MG tablet  Commonly known as:  EFFEXOR  Take 1 tablet by mouth 2 times daily     vitamin D 1000 UNIT Tabs tablet  Commonly known as:  CHOLECALCIFEROL  Take 1 tablet by mouth daily         * This list has 2 medication(s) that are the same as other medications prescribed for you. Read the directions carefully, and ask your doctor or other care provider to review them with you.                Where to Get Your Medications      These medications were sent to [address], [address] [place] [address]    Phone:  [phone]   · clindamycin 300 MG capsule  · clotrimazole 1 % cream         Time Spent on discharge is  31 mins in patient examination, evaluation, counseling as well as medication reconciliation, prescriptions for required medications, discharge plan and follow up. Electronically signed by   Pilar Grande MD  6/14/2019  11:36 AM    Attending Physician Statement  I Independently seen and examined the patient. I have discussed the care of the patient, including pertinent history and exam findings,  with the resident. I have reviewed the key elements of all parts of the encounter with the resident. I agree with the assessment, plan and orders as documented by the resident. Claudine Akhtar  Thank you Dr. Michelle Aguirre MD for the opportunity to be involved in this patient's care.

## 2019-06-14 NOTE — PLAN OF CARE
Problem: Falls - Risk of:  Goal: Will remain free from falls  Description  Will remain free from falls  6/14/2019 0510 by Mary David RN  Outcome: Ongoing  Note:   Patient remained free from falls this shift. Call light and bed side table are within reach, bed is in lowest position, and side rails are up x2. Will continue to monitor.    6/14/2019 0232 by Mary David RN  Outcome: Ongoing  Goal: Absence of physical injury  Description  Absence of physical injury  6/14/2019 0510 by Mary David RN  Outcome: Ongoing  6/14/2019 0232 by Mary David RN  Outcome: Ongoing     Problem: Tissue Perfusion - Peripheral, Altered:  Goal: Circulatory function within specified parameters  Description  Circulatory function within specified parameters  6/14/2019 0510 by Mary David RN  Outcome: Ongoing  6/14/2019 0232 by Mary David RN  Outcome: Ongoing     Problem: Discharge Planning:  Goal: Discharged to appropriate level of care  Description  Discharged to appropriate level of care  6/14/2019 0510 by Mary David RN  Outcome: Ongoing  6/14/2019 0232 by Mary David RN  Outcome: Ongoing     Problem: Serum Glucose Level - Abnormal:  Goal: Ability to maintain appropriate glucose levels will improve  Description  Ability to maintain appropriate glucose levels will improve  6/14/2019 0510 by Mary aDvid RN  Outcome: Ongoing  6/14/2019 0232 by Mary David RN  Outcome: Ongoing     Problem: Sensory Perception - Impaired:  Goal: Ability to maintain a stable neurologic state will improve  Description  Ability to maintain a stable neurologic state will improve  6/14/2019 0510 by Mary David RN  Outcome: Ongoing  6/14/2019 0232 by Mary David RN  Outcome: Ongoing

## 2019-06-14 NOTE — PROGRESS NOTES
[address] [facility].    PROGRESS NOTE             6/14/2019    8:46 AM    Name:   Lynn Angulo. MRN:     [id number]     Acct:      [de-identified]   Room:   2072/2072-01  IP Day:  2  Admit Date:  6/12/2019 11:21 AM    PCP:  Peter Lees MD  Code Status:  Full Code    Subjective:     C/C:   Chief Complaint   Patient presents with    Cellulitis     R axilla     Interval History Status: improved. Patient seen and examined. Overnight history obtained from nursing staff. Patient reports complete resolution of left axillary pain. Improvement of erythema and swelling. Eating and drinking well, ambulating well, good urinary output. No rashes noted. Review of Systems:     Review of Systems   Constitutional: Negative for chills, fatigue and fever. Respiratory: Negative for cough, shortness of breath and wheezing. Cardiovascular: Negative for chest pain and palpitations. Gastrointestinal: Negative for abdominal pain and diarrhea. Musculoskeletal: Positive for gait problem. Negative for arthralgias. Skin: Positive for color change and rash. Neurological: Negative for dizziness. Psychiatric/Behavioral: The patient is not nervous/anxious. Medications: Allergies: Allergies   Allergen Reactions    Levofloxacin Anaphylaxis     Patient reports needing epinephrine \"about 5 or 6 months ago\" for anaphylaxis (itching, hives, SOB/swelling) after receiving Levofloxacin. Previous report from 08/20/2012: Nausea/Vomiting    Lorazepam      Falls      Nsaids      CHF&CKD    Vancomycin Other (See Comments)     Itching, SOB, emesis upon infusion in ED 6/12/2019. Patient states he has had vancomycin \"a number of times\" before without issue.        Current Meds:   Scheduled Meds:    insulin regular human  50 Units Subcutaneous BID     insulin regular human  40 Units Subcutaneous Dinner    insulin lispro  0-18 Units Subcutaneous TID     insulin lispro  0-9 Units Subcutaneous Nightly    insulin glargine  10 Units Subcutaneous Nightly    sodium chloride flush  10 mL Intravenous 2 times per day    doxycycline (VIBRAMYCIN) IV  100 mg Intravenous Q12H    clindamycin (CLEOCIN) IV  600 mg Intravenous Q8H    aspirin  81 mg Oral Daily    bumetanide  3 mg Oral BID    clopidogrel  75 mg Oral Daily    ferrous sulfate  325 mg Oral Daily    fluocinonide   Topical Daily    mometasone-formoterol  2 puff Inhalation BID    lisinopril  5 mg Oral Daily    metolazone  2.5 mg Oral Once per day on Mon Thu    metoprolol tartrate  50 mg Oral BID    miconazole   Topical BID    pravastatin  40 mg Oral Nightly    pantoprazole  40 mg Oral Nightly    spironolactone  50 mg Oral Daily    varenicline  1 mg Oral BID    tiotropium  2 puff Inhalation Daily    lactobacillus  1 capsule Oral BID     heparin (porcine)  5,000 Units Subcutaneous 3 times per day     Continuous Infusions:    dextrose       PRN Meds: sodium chloride flush, acetaminophen, albuterol, nitroGLYCERIN, oxyCODONE HCl, glucose, dextrose, glucagon (rDNA), dextrose    Data:     Past Medical History:   has a past medical history of Acute bronchitis with chronic obstructive pulmonary disease (COPD) ([facility]), Acute on chronic kidney failure ([facility]), Acute on chronic respiratory failure ([facility]), Adhesive capsulitis of left shoulder, Anxiety, Arthropathy, unspecified, other specified sites, Asthma, Bilateral lower leg cellulitis, Bilateral lower leg cellulitis, Bilateral lower leg cellulitis, Blood in stool, CAD (coronary artery disease), Cellulitis of both lower extremities, Cellulitis of leg, left, CHF (congestive heart failure), NYHA class III ([facility]), Chronic back pain, Chronic headaches, Chronic kidney disease, Chronic ulcer of left leg, with fat layer exposed ([facility] [address] COPD (chronic obstructive pulmonary disease) ([facility] [address] COPD exacerbation ([address] Diabetic neuropathy ([facility] [address] readily visualized sonographically     Large bilateral hydroceles. Maricarmen Stewart Device Plmt/replace/removal    Result Date: 6/13/2019  PROCEDURE: ULTRASOUND GUIDED VASCULAR ACCESS. FLUOROSCOPY GUIDED PICC PLACEMENT 6/13/2019. HISTORY: ORDERING SYSTEM PROVIDED HISTORY: antibiotic therapy TECHNOLOGIST PROVIDED HISTORY: For IV antibiotic therapy Please insert double lumen  PICC line Reason for exam:->antibiotic therapy Lumen?->Double Lumen Cellulitis SEDATION: None FLUOROSCOPY DOSE AND TYPE OR TIME AND EXPOSURES: 12 seconds; D  TECHNIQUE: This procedure was performed by Dr. Nathalie Ferguson. Informed consent was obtained after a detailed explanation of the procedure including risks, benefits, and alternatives. Universal protocol was observed. The right arm was prepped and draped in sterile fashion using maximum sterile barrier technique. Local anesthesia was achieved with lidocaine. A micropuncture needle was used to access the right basilic vein using ultrasound guidance. An ultrasound image demonstrating patency of the vein with needle tip located within it. An image was obtained and stored in PACs. A 0.018 guidewire was used to place a peel-a-way sheath and a 5 [place] dual-lumen PICC was advanced with fluoroscopic guidance with the tip at the cavo-atrial junction. The catheter flushed easily and there was a good blood return. The catheter was secured to the skin. The patient tolerated the procedure well and there were no immediate complications. FINDINGS: Fluoroscopic image demonstrates the tip of the catheter at the cavo-atrial junction. Successful ultrasound and fluoroscopy guided PICC placement         Physical Examination:        Physical Exam   Constitutional: He is oriented to person, place, and time. He appears well-developed and well-nourished. No distress. Cardiovascular: Normal rate and regular rhythm. No murmur heard. Pulmonary/Chest: Effort normal. He has decreased breath sounds.  He has no wheezes. Abdominal: Soft. Bowel sounds are normal. There is no tenderness. Musculoskeletal:   Walks with cane   Neurological: He is alert and oriented to person, place, and time. Skin: Rash noted. There is erythema. Assessment:        Primary Problem  Cellulitis    Active Hospital Problems    Diagnosis Date Noted    Cellulitis [L03.90] 06/12/2019    Essential hypertension [I10]     Chronic diastolic congestive heart failure (HCC) [I50.32] 08/14/2013    CKD (chronic kidney disease) stage 3, GFR 30-59 ml/min ([facility]) [N18.3] 05/08/2013       Plan:        Left axillary cellulitis  Clindamycin, doxycycline  Consult infectious diseases, conditions appreciated  Pain control PRN  Possible reaction from IV vancomycin  PICC consult for midline placement    DM II  Resume home insulin  SSI coverage     Resume home hypertensive medications     COPD  Home medication regiment     Heparin for VTE prophylaxis  Protonix for GI prophylaxis    Possible discharge later this afternoon, pending infectious disease recommendations. Delfina Gerard MD  6/14/2019  8:46 AM   Attending Physician Statement  I Independently seen and examined the patient. I have discussed the care of the patient, including pertinent history and exam findings,  with the resident. I have reviewed the key elements of all parts of the encounter with the resident. I agree with the assessment, plan and orders as documented by the resident.     Agustin Suh

## 2019-06-14 NOTE — CARE COORDINATION
ONGOING DISCHARGE PLAN:    Spoke with patient regarding discharge plan and patient confirms that plan is still home with spouse. The patient remains agreeable to [facility] on discharge. Per ID IV cleocin to be changed to oral on discharge. Update- Writer in communication with Hugo from [facility] and made her aware that patient is to discharge today. Per Hugo she will pull all required documentation needed for start of care. Will continue to follow for additional discharge needs.     Electronically signed by Cindy Salmon RN on 6/14/2019 at 10:02 AM

## 2019-06-16 ENCOUNTER — TELEPHONE (OUTPATIENT)
Dept: FAMILY MEDICINE CLINIC | Age: 54
End: 2019-06-16

## 2019-06-16 DIAGNOSIS — L03.112 CELLULITIS OF AXILLA, LEFT: ICD-10-CM

## 2019-06-16 RX ORDER — CLINDAMYCIN HYDROCHLORIDE 300 MG/1
300 CAPSULE ORAL 4 TIMES DAILY
Qty: 28 CAPSULE | Refills: 0
Start: 2019-06-16 | End: 2019-06-23

## 2019-06-16 NOTE — TELEPHONE ENCOUNTER
Patient needs post hospital follow-up or transitional care with any provider, 30 minutes      Dirk Petersen from home care called me regarding Lisinopril and Aldactone interaction, I told her needs to continue the same, potassium is good, he is on high dose of Bumex      Left axilla wound, better, but still extensive.  Was d/c on clindamycin 300 mg TID, I gave verbal order to increase to 4 times a day due to his BMI and to take with Probiotics           Lab Results   Component Value Date     06/14/2019    K 4.3 06/14/2019    CL 92 06/14/2019    CO2 24 06/14/2019    BUN 61 06/14/2019    CREATININE 1.98 06/14/2019    GLUCOSE 234 06/14/2019    CALCIUM 9.9 06/14/2019

## 2019-06-17 ENCOUNTER — TELEPHONE (OUTPATIENT)
Dept: FAMILY MEDICINE CLINIC | Age: 54
End: 2019-06-17

## 2019-06-17 DIAGNOSIS — R11.0 NAUSEA: Primary | ICD-10-CM

## 2019-06-17 DIAGNOSIS — R19.7 DIARRHEA OF PRESUMED INFECTIOUS ORIGIN: ICD-10-CM

## 2019-06-17 RX ORDER — ONDANSETRON 4 MG/1
4 TABLET, FILM COATED ORAL EVERY 6 HOURS PRN
Qty: 24 TABLET | Refills: 0 | Status: SHIPPED | OUTPATIENT
Start: 2019-06-17 | End: 2019-06-23

## 2019-06-17 NOTE — TELEPHONE ENCOUNTER
Tiffany Segovia the home nurse was out to see Zack Singh today. She called stating he is c/o nausea that has occurred since he was discharged.  She thinks it is related to antibiotics but wants to know if pt can get something for nausea

## 2019-06-17 NOTE — TELEPHONE ENCOUNTER
Please let the patient know to  prescription from pharmacy. IS THE RASH GETTING BETTER? ALSO TAKE antibiotics WITH FOOD  IF ANY DIARRHEA, NEEDS TO LET US KNOW asap    Requested Prescriptions     Signed Prescriptions Disp Refills    ondansetron (ZOFRAN) 4 MG tablet 24 tablet 0     Sig: Take 1 tablet by mouth every 6 hours as needed for Nausea or Vomiting     Authorizing Provider: Nguyễnfarhad Kaur [address] [place], [place] [address] [address] [place] [address]  Phone: [phone] Fax: [phone]      Thank you!         FYI    Future Appointments   Date Time Provider William Hernandez   6/19/2019  2:00 PM [facility]   7/31/2019  9:00 AM [facility]EDU[facility]   8/1/2019  9:00 AM Claudia Vazquez MD Resp Spec [facility]   8/30/2019 10:00 AM [facility]       Controlled Substances Monitoring:

## 2019-06-19 ENCOUNTER — TELEPHONE (OUTPATIENT)
Dept: FAMILY MEDICINE CLINIC | Age: 54
End: 2019-06-19

## 2019-06-19 ENCOUNTER — OFFICE VISIT (OUTPATIENT)
Dept: FAMILY MEDICINE CLINIC | Age: 54
End: 2019-06-19
Payer: COMMERCIAL

## 2019-06-19 VITALS
BODY MASS INDEX: 42.66 KG/M2 | SYSTOLIC BLOOD PRESSURE: 178 MMHG | OXYGEN SATURATION: 97 % | WEIGHT: 315 LBS | DIASTOLIC BLOOD PRESSURE: 74 MMHG | HEIGHT: 72 IN | HEART RATE: 103 BPM

## 2019-06-19 DIAGNOSIS — F33.1 MODERATE EPISODE OF RECURRENT MAJOR DEPRESSIVE DISORDER (HCC): ICD-10-CM

## 2019-06-19 DIAGNOSIS — L30.4 INTERTRIGO: ICD-10-CM

## 2019-06-19 DIAGNOSIS — I10 ESSENTIAL HYPERTENSION: ICD-10-CM

## 2019-06-19 DIAGNOSIS — Z79.4 TYPE 2 DIABETES MELLITUS WITH DIABETIC POLYNEUROPATHY, WITH LONG-TERM CURRENT USE OF INSULIN (HCC): ICD-10-CM

## 2019-06-19 DIAGNOSIS — G89.29 CHRONIC MIDLINE LOW BACK PAIN WITH LEFT-SIDED SCIATICA: ICD-10-CM

## 2019-06-19 DIAGNOSIS — M54.9 CHRONIC BACK PAIN GREATER THAN 3 MONTHS DURATION: ICD-10-CM

## 2019-06-19 DIAGNOSIS — L03.112 CELLULITIS OF AXILLA, LEFT: Primary | ICD-10-CM

## 2019-06-19 DIAGNOSIS — M54.42 CHRONIC MIDLINE LOW BACK PAIN WITH LEFT-SIDED SCIATICA: ICD-10-CM

## 2019-06-19 DIAGNOSIS — I25.118 CORONARY ARTERY DISEASE OF NATIVE ARTERY OF NATIVE HEART WITH STABLE ANGINA PECTORIS (HCC): ICD-10-CM

## 2019-06-19 DIAGNOSIS — N18.30 CKD (CHRONIC KIDNEY DISEASE) STAGE 3, GFR 30-59 ML/MIN (HCC): ICD-10-CM

## 2019-06-19 DIAGNOSIS — F41.9 ANXIETY: ICD-10-CM

## 2019-06-19 DIAGNOSIS — M96.1 POSTLAMINECTOMY SYNDROME OF LUMBAR REGION: ICD-10-CM

## 2019-06-19 DIAGNOSIS — I50.32 CHRONIC DIASTOLIC CONGESTIVE HEART FAILURE (HCC): ICD-10-CM

## 2019-06-19 DIAGNOSIS — G89.29 CHRONIC BACK PAIN GREATER THAN 3 MONTHS DURATION: ICD-10-CM

## 2019-06-19 DIAGNOSIS — M51.36 DDD (DEGENERATIVE DISC DISEASE), LUMBAR: ICD-10-CM

## 2019-06-19 DIAGNOSIS — E11.42 TYPE 2 DIABETES MELLITUS WITH DIABETIC POLYNEUROPATHY, WITH LONG-TERM CURRENT USE OF INSULIN (HCC): ICD-10-CM

## 2019-06-19 DIAGNOSIS — E66.01 MORBID OBESITY WITH BMI OF 50.0-59.9, ADULT (HCC): ICD-10-CM

## 2019-06-19 DIAGNOSIS — J44.9 MIXED TYPE COPD (CHRONIC OBSTRUCTIVE PULMONARY DISEASE) (HCC): ICD-10-CM

## 2019-06-19 PROCEDURE — 1111F DSCHRG MED/CURRENT MED MERGE: CPT | Performed by: FAMILY MEDICINE

## 2019-06-19 PROCEDURE — 99496 TRANSJ CARE MGMT HIGH F2F 7D: CPT | Performed by: FAMILY MEDICINE

## 2019-06-19 PROCEDURE — G0444 DEPRESSION SCREEN ANNUAL: HCPCS | Performed by: FAMILY MEDICINE

## 2019-06-19 RX ORDER — FLUOXETINE HYDROCHLORIDE 40 MG/1
40 CAPSULE ORAL NIGHTLY
Qty: 90 CAPSULE | Refills: 3 | Status: SHIPPED | OUTPATIENT
Start: 2019-06-19 | End: 2019-06-27 | Stop reason: ALTCHOICE

## 2019-06-19 RX ORDER — VENLAFAXINE 100 MG/1
100 TABLET ORAL 2 TIMES DAILY
Qty: 60 TABLET | Refills: 3 | Status: CANCELLED | OUTPATIENT
Start: 2019-06-19

## 2019-06-19 RX ORDER — OXYCODONE HYDROCHLORIDE 10 MG/1
10 TABLET ORAL EVERY 8 HOURS PRN
Qty: 90 TABLET | Refills: 0 | Status: SHIPPED | OUTPATIENT
Start: 2019-06-19 | End: 2019-07-29 | Stop reason: SDUPTHER

## 2019-06-19 RX ORDER — NYSTATIN 100000 [USP'U]/G
POWDER TOPICAL
Qty: 60 G | Refills: 5 | Status: SHIPPED | OUTPATIENT
Start: 2019-06-19 | End: 2021-01-27

## 2019-06-19 ASSESSMENT — ENCOUNTER SYMPTOMS
ABDOMINAL DISTENTION: 0
COUGH: 0
CHEST TIGHTNESS: 0
WHEEZING: 0
DIARRHEA: 0
NAUSEA: 0
BACK PAIN: 1
SHORTNESS OF BREATH: 1
ABDOMINAL PAIN: 0
VOMITING: 0
CONSTIPATION: 0

## 2019-06-19 ASSESSMENT — PATIENT HEALTH QUESTIONNAIRE - PHQ9
4. FEELING TIRED OR HAVING LITTLE ENERGY: 2
1. LITTLE INTEREST OR PLEASURE IN DOING THINGS: 2
3. TROUBLE FALLING OR STAYING ASLEEP: 1
SUM OF ALL RESPONSES TO PHQ QUESTIONS 1-9: 14
2. FEELING DOWN, DEPRESSED OR HOPELESS: 2
7. TROUBLE CONCENTRATING ON THINGS, SUCH AS READING THE NEWSPAPER OR WATCHING TELEVISION: 2
8. MOVING OR SPEAKING SO SLOWLY THAT OTHER PEOPLE COULD HAVE NOTICED. OR THE OPPOSITE, BEING SO FIGETY OR RESTLESS THAT YOU HAVE BEEN MOVING AROUND A LOT MORE THAN USUAL: 1
9. THOUGHTS THAT YOU WOULD BE BETTER OFF DEAD, OR OF HURTING YOURSELF: 1
10. IF YOU CHECKED OFF ANY PROBLEMS, HOW DIFFICULT HAVE THESE PROBLEMS MADE IT FOR YOU TO DO YOUR WORK, TAKE CARE OF THINGS AT HOME, OR GET ALONG WITH OTHER PEOPLE: 1
5. POOR APPETITE OR OVEREATING: 1
SUM OF ALL RESPONSES TO PHQ QUESTIONS 1-9: 14
6. FEELING BAD ABOUT YOURSELF - OR THAT YOU ARE A FAILURE OR HAVE LET YOURSELF OR YOUR FAMILY DOWN: 2
SUM OF ALL RESPONSES TO PHQ9 QUESTIONS 1 & 2: 4

## 2019-06-19 NOTE — Clinical Note
Hi! He agreed for pulmonary rehab, doesn't like new people, wanted cardiac rehab but was denied. He wanted me to cancel his home care because of different nurse coming. Agreed to change to Prozac. Also, I'll be off 2 weeks starting Monday. My partner, Dr. Nini Meng will cover for me. I also told him I'll be off. Thank you for all of your help! He spoke very highly about you!
pt is  an 80 yo female pw 2 days of shilpa on b/l hands and arms, itchy. pt denies fever, pain but states itchy.  pt states she was gardening over the preceding several days

## 2019-06-19 NOTE — TELEPHONE ENCOUNTER
Please call home care and cancel home care  Patient requested. Cellulitis resolved.  He will start cardiac rehab

## 2019-06-19 NOTE — TELEPHONE ENCOUNTER
[facility] called back stating patient agreed to have a service as longest he gets same nurse every time .   FYI

## 2019-06-19 NOTE — PROGRESS NOTES
Visit Information    Have you changed or started any medications since your last visit including any over-the-counter medicines, vitamins, or herbal medicines? no   Are you having any side effects from any of your medications? -  no  Have you stopped taking any of your medications? Is so, why? -  no    Have you seen any other physician or provider since your last visit? No  Have you had any other diagnostic tests since your last visit? No  Have you been seen in the emergency room and/or had an admission to a hospital since we last saw you? Yes - Records Obtained  Have you had your routine dental cleaning in the past 6 months? no    Have you activated your [employer] account? If not, what are your barriers?  Yes     Patient Care Team:  Michelle Aguirre MD as PCP - General (Family Medicine)  Michelle Aguirre MD as PCP - [facility]  Chas Ji MD as Consulting Physician (Otolaryngology)  Marcela Merchant MD as Consulting Physician (Endocrinology)  Jeremiah Mims DO as Consulting Physician (Cardiology)  Nancy Bradley DPM as Surgeon (Podiatry)  Elba Gutierrez MD as Consulting Physician (Ophthalmology)  MD Evin as Consulting Physician (Nephrology)  Eveline Macedo MD as Consulting Physician (Pulmonology)  Ulysses Forster, MD as Consulting Physician (Pulmonology)  Tommy Ambrocio MD as Surgeon (Vascular Surgery)  Carter Tarango MD as Consulting Physician (Gastroenterology)  Janel Griffith [facility] as Pharmacist (Pharmacist)  Ulysses Forster, MD as Consulting Physician (Pulmonology)    Medical History Review  Past Medical, Family, and Social History reviewed and does contribute to the patient presenting condition    Health Maintenance   Topic Date Due    Shingles Vaccine (1 of 2) 12/30/2014    Colon cancer screen colonoscopy  11/03/2017    Hepatitis B Vaccine (2 of 3 - Risk 3-dose series) 06/26/2019    A1C test (Diabetic or Prediabetic)  07/15/2019    Lipid screen  09/20/2019    Diabetic retinal exam  10/24/2019    Diabetic foot exam  05/02/2020    Potassium monitoring  06/14/2020    Creatinine monitoring  06/14/2020    Annual Wellness Visit (AWV)  12/30/2027    DTaP/Tdap/Td vaccine (2 - Td) 09/12/2028    Flu vaccine  Completed    Pneumococcal 0-64 years Vaccine  Completed    Hepatitis C screen  Completed    HIV screen  Completed      Post-Discharge Transitional Care Management Services or Hospital Follow Up      [facility]. YOB: 1965    Date of Office Visit:  6/19/2019  Date of Hospital Admission: 6/12/19  Date of Hospital Discharge: 6/14/19  Readmission Risk Score(high >=14%.  Medium >=10%):Readmission Risk Score: 31      Care management risk score Rising risk (score 2-5) and Complex Care (Scores >=6): 8     Non face to face  following discharge, date last encounter closed (first attempt may have been earlier): 6/17/2019 12:53 PM 6/17/2019 12:53 PM    Call initiated 2 business days of discharge: Yes     Patient Active Problem List   Diagnosis    DDD (degenerative disc disease), lumbar    Hypertriglyceridemia    CKD (chronic kidney disease) stage 3, GFR 30-59 ml/min ([address]    CAD (coronary artery disease) s/p 1 stent    Mixed type COPD (chronic obstructive pulmonary disease) ([address]    Type 2 diabetes mellitus with diabetic polyneuropathy, with long-term current use of insulin (HCC)    Chronic pancreatitis ([address]    Displacement of lumbar intervertebral disc without myelopathy    Chronic diastolic congestive heart failure (HCC)    Insomnia    BPH (benign prostatic hyperplasia)    Class 3 obesity due to excess calories with serious comorbidity and body mass index (BMI) of 50.0 to 59.9 in adult    Essential hypertension    Hepatic steatosis    History of rib fracture    Umbilical hernia    Adrenal gland anomaly    Spinal stenosis of lumbar region without neurogenic claudication    Chronic back pain greater than 3 months duration    Gastroesophageal reflux disease without esophagitis    Hyperlipidemia with target LDL less than 70    Lower urinary tract symptoms (LUTS)    Vitamin D deficiency    Iron deficiency anemia due to chronic blood loss    BARBY treated with BiPAP    Chronic midline low back pain with left-sided sciatica    Stasis dermatitis of both legs    Anxiety    Intertrigo axillary b/l    History of recurrent ear infection    Mixed conductive and sensorineural hearing loss of both ears    Tinnitus of both ears    Slow transit constipation    Chronic infection of both external ears    Bilateral leg edema    Tinea pedis of both feet    Onychomycosis    MDD (major depressive disorder), recurrent severe, without psychosis ([address]    Hydrocele, bilateral    Celiac artery stenosis ([address]    Myopia    Nuclear nonsenile cataract    Presbyopia    Postlaminectomy syndrome of lumbar region    Venous insufficiency of left lower extremity    Bilateral hearing loss       Allergies   Allergen Reactions    Levofloxacin Anaphylaxis     Patient reports needing epinephrine \"about 5 or 6 months ago\" for anaphylaxis (itching, hives, SOB/swelling) after receiving Levofloxacin. Previous report from 2012: Nausea/Vomiting    Lorazepam      Falls      Nsaids      CHF&CKD    Vancomycin Other (See Comments)     Itching, SOB, emesis upon infusion in ED 2019. Patient states he has had vancomycin \"a number of times\" before without issue. couldn't breath and talk, throat closed     Social History     Tobacco Use    Smoking status: Former Smoker     Packs/day: 2.00     Years: 33.00     Pack years: 66.00     Types: Cigarettes     Start date: 1985     Last attempt to quit: 2019     Years since quittin.1    Smokeless tobacco: Former User     Types: Snuff     Quit date: 1995   Substance Use Topics    Alcohol use: No     Alcohol/week: 0.0 oz    Drug use: Yes     Types: Marijuana       Medications UNIT TABS tablet  Take 1 tablet by mouth daily                   Medications marked \"taking\" at this time  Outpatient Medications Marked as Taking for the 6/19/19 encounter (Office Visit) with Peter Lees MD   Medication Sig Dispense Refill    ondansetron (ZOFRAN) 4 MG tablet Take 1 tablet by mouth every 6 hours as needed for Nausea or Vomiting 24 tablet 0    clindamycin (CLEOCIN) 300 MG capsule Take 1 capsule by mouth 4 times daily for 7 days 28 capsule 0    clotrimazole (LOTRIMIN) 1 % cream Apply topically 2 times daily. 1 Tube 1    Lactobacillus (PROBIOTIC ACIDOPHILUS) TABS Take 1 tablet by mouth 2 times daily 60 tablet 0    tiotropium (SPIRIVA RESPIMAT) 2.5 MCG/ACT AERS inhaler Inhale 2 puffs into the lungs daily 4 g 3    albuterol (PROVENTIL) (2.5 MG/3ML) 0.083% nebulizer solution Take 3 mLs by nebulization every 6 hours as needed for Wheezing or Shortness of Breath 120 vial 0    Lancet Devices (LANCING DEVICE) MISC Provide patient with lancing device appropriate for his machine/lancing needles. 1 each 1    oxyCODONE HCl (OXY-IR) 10 MG immediate release tablet Take 1 tablet by mouth every 8 hours as needed for Pain for up to 30 days.  90 tablet 0    pravastatin (PRAVACHOL) 40 MG tablet Take 1 tablet by mouth nightly 90 tablet 3    nystatin (MYCOSTATIN) 949385 UNIT/GM powder Apply 2- 3 times daily skin folds 60 g 3    varenicline (CHANTIX) 1 MG tablet Take 1 tablet by mouth 2 times daily 60 tablet 1    Lidocaine 4 % LOTN Apply topically      blood glucose test strips (ONE TOUCH ULTRA TEST) strip USE ONE STRIP TO TEST THREE TIMES A  strip 2    metolazone (ZAROXOLYN) 2.5 MG tablet Take 2.5 mg by mouth Twice a Week      PROAIR  (90 Base) MCG/ACT inhaler INHALE TWO PUFFS BY MOUTH EVERY 6 HOURS AS NEEDED FOR WHEEZING OR SHORTNESS OF BREATH 1 Inhaler 4    spironolactone (ALDACTONE) 50 MG tablet Take 1 tablet by mouth daily 90 tablet 3    vitamin D (CHOLECALCIFEROL) 1000 UNIT TABS tablet Take 1 tablet by mouth daily 90 tablet 3    docusate sodium (STOOL SOFTENER) 100 MG capsule Take 1 capsule by mouth 2 times daily 180 capsule 3    venlafaxine (EFFEXOR) 100 MG tablet Take 1 tablet by mouth 2 times daily 60 tablet 3    Handicap Placard MISC by Does not apply route Can't walk greater than 200 feet. Expires in 5 years. 1 each 0    tiZANidine (ZANAFLEX) 4 MG tablet TAKE ONE TABLET BY MOUTH EVERY 8 HOURS AS NEEDED FOR BACK PAIN 90 tablet 5    pantoprazole (PROTONIX) 40 MG tablet Take 1 tablet by mouth nightly 90 tablet 3    miconazole (MICOTIN) 2 % powder Apply topically 2 times daily. 45 g 1    Ferrous Sulfate (IRON) 325 (65 Fe) MG TABS Take 1 tablet by mouth daily 90 tablet 3    metoprolol tartrate (LOPRESSOR) 50 MG tablet Take 1 tablet by mouth 2 times daily 180 tablet 3    lisinopril (PRINIVIL;ZESTRIL) 5 MG tablet Take 1 tablet by mouth daily . Discontinued Lisinopril  10 mg 90 tablet 3    clopidogrel (PLAVIX) 75 MG tablet Take 1 tablet by mouth daily 90 tablet 2    bumetanide (BUMEX) 1 MG tablet Take 3 tablets by mouth 2 times daily 180 tablet 1    fluticasone-salmeterol (ADVAIR HFA) 230-21 MCG/ACT inhaler Inhale 2 puffs into the lungs 2 times daily 12 g 5    insulin regular human (HUMULIN R) 500 UNIT/ML concentrated injection vial Inject into the skin 3 times daily Indications: 50 in the morning 50 at lunch and 40 at dinner. U-500 insulin--Pt. Takes 50 u at breakfast and lunch and  40 u at supper time.       nitroGLYCERIN (NITROSTAT) 0.4 MG SL tablet Place 1 tablet under the tongue every 5 minutes as needed for Chest pain 25 tablet 3    fluticasone (CUTIVATE) 0.05 % cream Apply topically 2 times daily       fluticasone (FLONASE) 50 MCG/ACT nasal spray 2 sprays by Nasal route daily (Patient taking differently: 2 sprays by Nasal route daily as needed (sinus symptoms) ) 1 Bottle 3    Melatonin 10 MG TABS Take 10 mg by mouth nightly as needed (insomnia) 90 tablet 1    COMFORT ASSIST INSULIN SYRINGE 31G X 5/16\" 1 ML MISC       aspirin 81 MG EC tablet Take 81 mg by mouth daily. Medications patient taking as of now reconciled against medications ordered at time of hospital discharge: Yes    Chief Complaint   Patient presents with    Follow-Up from Hospital     cellulitis left axilla       HPI    Inpatient course: Discharge summary reviewed- see chart. Interval history/Current status: Improving    Patient was admitted 6/12/2019 through 6/14/2019 at [facility] due to left axilla cellulitis due to failed outpatient therapy. He was admitted through emergency room and he had a bad reaction to vancomycin, allergy list updated. He does say that he received vancomycin before and he did not have any reaction. While in the hospital, patient was started on IV doxycycline and IV clindamycin. Infectious disease were consulted. His cellulitis improved. He was discharged home on antibiotics and he still has 3 more days of antibiotics. Today,  he reports his cellulitis is much improved. There is no drainage. He is worried about the recurrent infections. He had recurrent axillary bilateral and inguinal infections and abscesses. He uses nystatin powder, but not all the time. We discussed using the nystatin powder all the time to prevent further infections of the skin folds    He is very frustrated today and he admits of worsening depression  and he wants to end it all. He does not want to talk with anyone new, he absolutely declines psychologist psychiatrist.  He does not want to see any other nurse practitioners in our office. He does not want to see his visiting nurses which creates him a lot of discomfort when they send another nurse all the time. Patient says she has not been helpful by just checking on him the vitals and medications and he has improved. Says he doesn't want [place] to come into his home, doesn't want new people coming to the house.     Discussed with him that we can stop home care if this will help him and he relaxed. He says his wife has been helping him when she can, but she has been working. He is also agreeable to change venlafaxine to Prozac. He says he did take it before, but someone stopped it at one point. It was helping and did not have any side effects from Prozac. We will change it and he is agreeable    PHQ-2 Over the past 2 weeks, how often have you been bothered by any of the following problems? Little interest or pleasure in doing things: More than half the days  Feeling down, depressed, or hopeless: More than half the days  PHQ-2 Score: 4  PHQ-9 Over the past 2 weeks, how often have you been bothered by any of the following problems? Trouble falling or staying asleep, or sleeping too much: Several days  Feeling tired or having little energy: More than half the days  Poor appetite or overeating: Several days  Feeling bad about yourself - or that you are a failure or have let yourself or your family down: More than half the days  Trouble concentrating on things, such as reading the newspaper or watching television: More than half the days  Moving or speaking so slowly that other people could have noticed.  Or the opposite - being so fidgety or restless that you have been moving around a lot more than usual: Several days  Thoughts that you would be better off dead, or of hurting yourself in some way: Several days  If you checked off any problems, how difficult have these problems made it for you to do your work, take care of things at home, or get along with other people?: Somewhat difficult  PHQ-9 Completed?: Complete  PHQ-9 Total Score: 14    PHQ Scores 6/19/2019 3/15/2019 2/22/2019 10/23/2018 9/18/2018 8/18/2017 6/23/2017   PHQ2 Score 4 6 2 6 3 2 5   PHQ9 Score 14 17 11 11 6 14 21     Interpretation of Total Score Depression Severity: 1-4 = Minimal depression, 5-9 = Mild depression, 10-14 = Moderate depression, 15-19 = Moderately severe depression, 20-27 = Severe depression    Patient has multiple comorbidities. COPD, CHF, coronary artery disease, hypertension diabetes, morbid obesity, BARBY. Patient reports fatigue, shortness of breath at baseline, orthopnea. He denies leg swelling. He denies cough. He reports compliance with his CPAP. He denies chest pain, palpitations, mucus production. He reports shortness of breath at baseline. He does not smoke anymore. He uses oxygen about 3 times a week when he feels more short of breath. Reports leg edema is resolved and his legs feel better. He is agreeable for rehab if he would work with people that he knows. We discussed options and he is agreeable for cardiac rehab. He has a stent. He has been going to congestive heart failure for over 10 years and he knows and he feels comfortable working with the nurses [facility] at [facility]. Lost 6 lbs since last appointment , less than 1 mo ago. Wt Readings from Last 3 Encounters:   06/19/19 (!) 400 lb 6.4 oz (181.6 kg)   06/13/19 (!) 408 lb 4.7 oz (185.2 kg)   06/12/19 (!) 406 lb (184.2 kg)     Readings from Last 3 Encounters:   05/29/19 (!) 406 lb (184.2 kg)   05/09/19 (!) 411 lb 9.6 oz (186.7 kg)   05/02/19 (!) 404 lb 12.8 oz (183.6 kg)       Pulse ox is normal and today he is without the oxygen  SpO2 Readings from Last 5 Encounters:   06/19/19 97%   06/14/19 97%   06/12/19 96%   05/30/19 98%   05/29/19 97%         Has type 2 Diabetes mellitus, which is improving since he is back at home. Blood Glucose 88, 123 better since home  In the hospital Blood Glucose was 400-500's, as they didn't have Humulin R U 500.      Patient reports compliance with diabetic medications and he follows with Dr. Roseline Benson  Lab Results   Component Value Date    LABA1C 7.2 05/02/2019    LABA1C 8.2 (H) 04/15/2019    LABA1C 10.7 01/18/2019     Lab Results   Component Value Date    LABMICR 7 12/11/2018         Has chronic back pain for many years, radiating to signs stable and within normal limits except high BP, tachycardia and Morbid obesity per BMI. BP (!) 178/74   Pulse 103   Ht 6' (1.829 m)   Wt (!) 400 lb 6.4 oz (181.6 kg)   SpO2 97%   BMI 54.30 kg/m²      Vitals:    06/19/19 1339 06/19/19 1345   BP: (!) 143/75 (!) 178/74   Pulse: 103    SpO2: 97%    Weight: (!) 400 lb 6.4 oz (181.6 kg)    Height: 6' (1.829 m)      Body mass index is 54.3 kg/m². Physical Exam   Constitutional: He is oriented to person, place, and time. He appears well-developed and well-nourished. No distress. HENT:   Head: Normocephalic and atraumatic. Right Ear: There is swelling and tenderness. Decreased hearing is noted. Left Ear: There is swelling and tenderness. Decreased hearing is noted. Nose: Nose normal.   Mouth/Throat: Oropharynx is clear and moist. No oropharyngeal exudate. Mallampati  4. Very dry scaly skin bilateral ear canals   Eyes: Conjunctivae and EOM are normal. Right eye exhibits no discharge. Left eye exhibits no discharge. No scleral icterus. Neck: Normal range of motion. Neck supple. No JVD present. No thyromegaly present. Cardiovascular: Normal rate, regular rhythm, normal heart sounds and intact distal pulses. Pulmonary/Chest: Effort normal. No respiratory distress. He has decreased breath sounds in the right lower field and the left lower field. He has no wheezes. He has no rales. He exhibits no tenderness. He has been breathing through pursed lips and he looks short of breath. He says he always does this. However , from what I've seen him, it's more often today. Abdominal: Soft. Bowel sounds are normal. He exhibits no distension. There is no hepatosplenomegaly. There is no tenderness. There is no rigidity, no rebound and no guarding. Very Obese abdomen. Musculoskeletal: He exhibits tenderness. He exhibits no edema. Lumbar back: He exhibits decreased range of motion, tenderness, bony tenderness, pain and spasm.    Ambulates with cane  Old vertical lumbar surgical scar. Neurological: He is alert and oriented to person, place, and time. No cranial nerve deficit. He exhibits normal muscle tone. Coordination normal.   Skin: Skin is warm and dry. Capillary refill takes less than 2 seconds. Rash noted. He is not diaphoretic. Acanthosis nigricans on the neck  Bilateral stasis dermatitis, brownish-reddish and significant dry skin on the legs. Cellulitis in the left axillary area greatly improved, soft area, mild erythema, there is some scaling noted, but no induration and no open areas. Psychiatric: His behavior is normal. Judgment and thought content normal. His mood appears anxious. His affect is labile. His speech is rapid and/or pressured. He exhibits a depressed mood. He is very frustrated today, he calls himself Tran Soto", which I told him it is not true, I told him it's just frustration   Nursing note and vitals reviewed. Most recent labs reviewed with the patient and all questions fully answered.   Leukocytosis  Chronic anemia  He is overdue for colonoscopy and he will schedule it when he feels better  Hyperglycemia  Chronic kidney disease stage 3 stable        Lab Results   Component Value Date    WBC 11.3 (H) 06/14/2019    HGB 12.5 (L) 06/14/2019    HCT 38.0 (L) 06/14/2019    MCV 85.6 06/14/2019     06/14/2019       Lab Results   Component Value Date     06/14/2019    K 4.3 06/14/2019    CL 92 06/14/2019    CO2 24 06/14/2019    BUN 61 06/14/2019    CREATININE 1.98 06/14/2019    GLUCOSE 234 06/14/2019    CALCIUM 9.9 06/14/2019        Lab Results   Component Value Date    ALT 25 05/09/2019    AST 19 05/09/2019    ALKPHOS 61 05/09/2019    BILITOT 0.35 05/09/2019       Lab Results   Component Value Date    TSH 1.97 02/22/2019       Lab Results   Component Value Date    LDLCHOLESTEROL 71 09/20/2018    LDLCHOLESTEROL 89 08/16/2017    LDLCHOLESTEROL 66 12/26/2016       Lab Results   Component Value Date CHOLHDLRATIO 5.8 (H) 09/20/2018    CHOLHDLRATIO 6.4 (H) 08/16/2017    CHOLHDLRATIO 4.8 12/26/2016       Lab Results   Component Value Date    LABA1C 7.2 05/02/2019     Assessment/Plan:  1. Cellulitis of axilla, left  Improving   Continue clindamycin and doxycycline until gone  I increased his clindamycin back to 4 times a day instead of 3 times a day via telephone encounter prior to the appointment  - [address] [place] [address] MED LIST  Start using the nystatin powder every day    2. Moderate episode of recurrent major depressive disorder (HCC)  Inadequately controlled   - IN DISCHARGE MEDS RECONCILED W/ CURRENT OUTPATIENT MED LIST  -start  FLUoxetine (PROZAC) 40 MG capsule; Take 1 capsule by mouth nightly Stop Effexor  Dispense: 90 capsule; Refill: 3    3. Chronic diastolic congestive heart failure (HCC)  Stable  Lab Results   Component Value Date    LVEF 53 03/29/2018    LVEFMODE Echo 08/29/2017   - IN DISCHARGE MEDS RECONCILED W/ CURRENT OUTPATIENT MED LIST  Cardiac rehab not approved  Continue metolazone twice a week, Spironolactone 50 mg daily, metoprolol 50 mg twice a day, lisinopril 5 mg daily, Bumex 3 mg twice a day    Wallow up with CHF clinic and cardiology    4. Mixed type COPD (chronic obstructive pulmonary disease) (HCC)  Improving since he quit smoking  Praise given  2 nebulizer treatments, currently inhalers, oxygen as needed at nighttime encourage him to use it every night  - IN DISCHARGE MEDS RECONCILED W/ CURRENT OUTPATIENT MED LIST  - Full PFT Study With Bronchodilator; Future  - [facility] [address] [place] [address]    5. Chronic back pain greater than 3 months duration  stable  - IN DISCHARGE MEDS RECONCILED W/ CURRENT OUTPATIENT MED LIST  - oxyCODONE HCl (OXY-IR) 10 MG immediate release tablet; Take 1 tablet by mouth every 8 hours as needed for Pain for up to 30 days. Dispense: 90 tablet; Refill: 0      6.  Type 2 diabetes mellitus with diabetic polyneuropathy, with mouth every 8 hours as needed for Pain for up to 30 days. Dispense: 90 tablet; Refill: 0    13. Coronary artery disease of native artery of native heart with stable angina pectoris (HCC)  Stable  Continue current cardiac meds     - [place] DISCHARGE MEDS RECONCILED W/ CURRENT OUTPATIENT MED LIST    14. Intertrigo  Inadequately controlled   - [place] DISCHARGE MEDS RECONCILED W/ CURRENT OUTPATIENT MED LIST  - nystatin (MYCOSTATIN) 488988 UNIT/GM powder; Apply 2-3 times daily in skin folds. Dispense: 60 g; Refill: 5    15. Morbid obesity with BMI of 50.0-59.9, adult ([facility]  Improving   - [place] DISCHARGE MEDS RECONCILED W/ CURRENT OUTPATIENT MED LIST    Patient was asked about his current diet and exercise habits, and personalized advice was provided regarding recommended lifestyle changes. Patient's comorbid health conditions associated with elevated BMI were discussed, including congestive heart failure, COPD/asthma, coronary artery disease, degenerative joint disease, diabetes, dyslipidemia, GERD, hypertension, mood disorder, obstructive sleep apnea, skin rashes and venous stasis disease, as well as the likely benefits of weight loss. Based upon patient's motivation to change his behavior, the following plan was agreed upon to work toward a weight loss goal of  1-2 pounds/week: low carbohydrate diet and increase physical activity as follows: start pulmonary rehab. Educational materials for  weight loss were provided. Patient will follow-up in 1 month(s) with PCP. Provider spent 10 minutes counseling patient. Medical Decision Making: high complexity    Orders Placed This Encounter   Medications    nystatin (MYCOSTATIN) [id number] UNIT/GM powder     Sig: Apply 2-3 times daily in skin folds. Dispense:  60 g     Refill:  5    oxyCODONE HCl (OXY-IR) 10 MG immediate release tablet     Sig: Take 1 tablet by mouth every 8 hours as needed for Pain for up to 30 days.      Dispense:  90 tablet     Refill:  0     Reduce doses taken as pain becomes manageable    FLUoxetine (PROZAC) 40 MG capsule     Sig: Take 1 capsule by mouth nightly Stop Effexor     Dispense:  90 capsule     Refill:  3     Orders Placed This Encounter   Procedures    Basic Metabolic Panel     Standing Status:   Future     Standing Expiration Date:   6/19/2020   [facility] [address] [facility]     Referral Priority:   Routine     Referral Type:   Eval and Treat     Referral Reason:   Specialty Services Required     Requested Specialty:   Rehabilitation     Number of Visits Requested:   1    Full PFT Study With Bronchodilator     Standing Status:   Future     Standing Expiration Date:   6/19/2020     Scheduling Instructions:      Pre- and post bronchodilator    VA DISCHARGE MEDS RECONCILED W/ CURRENT OUTPATIENT MED LIST     Medications Discontinued During This Encounter   Medication Reason    venlafaxine (EFFEXOR) 100 MG tablet Alternate therapy    nystatin (MYCOSTATIN) 903456 UNIT/GM powder REORDER    oxyCODONE HCl (OXY-IR) 10 MG immediate release tablet REORDER     Controlled Substance Monitoring:    Acute and Chronic Pain Monitoring:   RX Monitoring 5/29/2019   Attestation The Prescription Monitoring Report for this patient was reviewed today. Periodic Controlled Substance Monitoring Possible medication side effects, risk of tolerance/dependence & alternative treatments discussed. ;No signs of potential drug abuse or diversion identified: otherwise, see note documentation   Chronic Pain > 50 MEDD Obtained or confirmened \"Consent of Opioid Use\" on file.    Chronic Pain > 80 MEDD -               Future Appointments   Date Time Provider William Hernandez   6/27/2019  7:30 AM [facility] RM [facility] None   6/27/2019  8:50 AM [facility] MEDICATION MGMT [facility]   7/31/2019  9:00 AM SCHEDULE, SOHAM Marr ABIMAEL   8/1/2019  9:00 AM Ten Harrington MD Resp Spec [address]   8/7/2019 11:00 AM MD nani Rodriguez ABIMAEL 8/30/2019 10:00 AM Saul Wright MD fp sc [id number]

## 2019-06-20 ENCOUNTER — TELEPHONE (OUTPATIENT)
Dept: FAMILY MEDICINE CLINIC | Age: 54
End: 2019-06-20

## 2019-06-20 PROBLEM — L03.90 CELLULITIS: Status: RESOLVED | Noted: 2019-06-12 | Resolved: 2019-06-20

## 2019-06-20 ASSESSMENT — ENCOUNTER SYMPTOMS: APNEA: 1

## 2019-06-20 NOTE — TELEPHONE ENCOUNTER
He doesn't meet criteria to qualify for Cardiac Rehab, Per his insurance : didn't have heart attack EF should be under 30. Rupal Jeffrey nurse will help him adjust with people/ nurses at Pulmonary rehab .

## 2019-06-20 NOTE — TELEPHONE ENCOUNTER
Please communicate with CHF clinic  Patient only wanted them, same people who he already knows \"FOR 10 YRS\". He has a stent, WHY DOESN'T HE QUALIFY? If I convince him to go to  pulmonary rehab, do they have same nurses who work at [address] [place] [address]?   (I suppose not, but he wanted me to ask)    HE REALLY DOESN'T [facility].

## 2019-06-21 ENCOUNTER — HOSPITAL ENCOUNTER (OUTPATIENT)
Age: 54
Discharge: HOME | End: 2019-06-21
Payer: COMMERCIAL

## 2019-06-21 DIAGNOSIS — N18.30 CKD (CHRONIC KIDNEY DISEASE) STAGE 3, GFR 30-59 ML/MIN (HCC): ICD-10-CM

## 2019-06-21 LAB
ANION GAP SERPL CALCULATED.3IONS-SCNC: 14 MMOL/L (ref 9–17)
BUN BLDV-MCNC: 27 MG/DL (ref 6–20)
BUN/CREAT BLD: ABNORMAL (ref 9–20)
CALCIUM SERPL-MCNC: 9.2 MG/DL (ref 8.6–10.4)
CHLORIDE BLD-SCNC: 105 MMOL/L (ref 98–107)
CO2: 21 MMOL/L (ref 20–31)
CREAT SERPL-MCNC: 1.3 MG/DL (ref 0.7–1.2)
GFR AFRICAN AMERICAN: >60 ML/MIN
GFR NON-AFRICAN AMERICAN: 58 ML/MIN
GFR SERPL CREATININE-BSD FRML MDRD: ABNORMAL ML/MIN/{1.73_M2}
GFR SERPL CREATININE-BSD FRML MDRD: ABNORMAL ML/MIN/{1.73_M2}
GLUCOSE BLD-MCNC: 153 MG/DL (ref 70–99)
POTASSIUM SERPL-SCNC: 4.7 MMOL/L (ref 3.7–5.3)
SODIUM BLD-SCNC: 140 MMOL/L (ref 135–144)

## 2019-06-21 PROCEDURE — 80048 BASIC METABOLIC PNL TOTAL CA: CPT

## 2019-06-21 PROCEDURE — 36415 COLL VENOUS BLD VENIPUNCTURE: CPT

## 2019-06-21 NOTE — RESULT ENCOUNTER NOTE
Racquelt comment sent to patient. Improved Chronic kidney disease , Continue current treatment and follow up with psychiatrist and psychologist as scheduled.   Improved Blood Glucose

## 2019-06-24 ENCOUNTER — TELEPHONE (OUTPATIENT)
Dept: FAMILY MEDICINE CLINIC | Age: 54
End: 2019-06-24

## 2019-06-24 DIAGNOSIS — R56.9 SEIZURE (HCC): Primary | ICD-10-CM

## 2019-06-24 NOTE — TELEPHONE ENCOUNTER
Please advise him he needs to see a neurologist for seizures, even if he doesn't have any anymore, they can come back. Shouldn't drive! Diagnosis Orders   1.  Seizure [facility])  Scott Toro MD, Neurology, [place]

## 2019-06-24 NOTE — TELEPHONE ENCOUNTER
When speaking with pt he sounded extremely SOB. I asked if he was still having trouble breathing since he sounded worse then when he was here for appt and he said yes.  I advised to go to urgent care or ED if it got worse and he refused

## 2019-06-27 ENCOUNTER — HOSPITAL ENCOUNTER (OUTPATIENT)
Dept: OTHER | Age: 54
Discharge: HOME | End: 2019-06-27
Payer: COMMERCIAL

## 2019-06-27 ENCOUNTER — OFFICE VISIT (OUTPATIENT)
Dept: FAMILY MEDICINE CLINIC | Age: 54
End: 2019-06-27
Payer: COMMERCIAL

## 2019-06-27 ENCOUNTER — TELEPHONE (OUTPATIENT)
Dept: FAMILY MEDICINE CLINIC | Age: 54
End: 2019-06-27

## 2019-06-27 ENCOUNTER — APPOINTMENT (OUTPATIENT)
Dept: PHARMACY | Age: 54
End: 2019-06-27
Payer: COMMERCIAL

## 2019-06-27 VITALS
OXYGEN SATURATION: 99 % | SYSTOLIC BLOOD PRESSURE: 122 MMHG | BODY MASS INDEX: 55.77 KG/M2 | DIASTOLIC BLOOD PRESSURE: 70 MMHG | HEART RATE: 81 BPM | RESPIRATION RATE: 22 BRPM | WEIGHT: 315 LBS

## 2019-06-27 VITALS
HEART RATE: 78 BPM | WEIGHT: 315 LBS | SYSTOLIC BLOOD PRESSURE: 121 MMHG | HEIGHT: 72 IN | OXYGEN SATURATION: 99 % | DIASTOLIC BLOOD PRESSURE: 62 MMHG | BODY MASS INDEX: 42.66 KG/M2

## 2019-06-27 DIAGNOSIS — F33.2 MDD (MAJOR DEPRESSIVE DISORDER), RECURRENT SEVERE, WITHOUT PSYCHOSIS (HCC): ICD-10-CM

## 2019-06-27 DIAGNOSIS — R56.9 SEIZURES (HCC): Primary | ICD-10-CM

## 2019-06-27 PROCEDURE — G8598 ASA/ANTIPLAT THER USED: HCPCS | Performed by: FAMILY MEDICINE

## 2019-06-27 PROCEDURE — G8417 CALC BMI ABV UP PARAM F/U: HCPCS | Performed by: FAMILY MEDICINE

## 2019-06-27 PROCEDURE — 1036F TOBACCO NON-USER: CPT | Performed by: FAMILY MEDICINE

## 2019-06-27 PROCEDURE — 3017F COLORECTAL CA SCREEN DOC REV: CPT | Performed by: FAMILY MEDICINE

## 2019-06-27 PROCEDURE — 1111F DSCHRG MED/CURRENT MED MERGE: CPT | Performed by: FAMILY MEDICINE

## 2019-06-27 PROCEDURE — 99211 OFF/OP EST MAY X REQ PHY/QHP: CPT

## 2019-06-27 PROCEDURE — G8427 DOCREV CUR MEDS BY ELIG CLIN: HCPCS | Performed by: FAMILY MEDICINE

## 2019-06-27 PROCEDURE — 99213 OFFICE O/P EST LOW 20 MIN: CPT | Performed by: FAMILY MEDICINE

## 2019-06-27 RX ORDER — VENLAFAXINE HYDROCHLORIDE 75 MG/1
75 CAPSULE, EXTENDED RELEASE ORAL DAILY
Qty: 30 CAPSULE | Refills: 3 | Status: SHIPPED | OUTPATIENT
Start: 2019-06-27 | End: 2019-10-10 | Stop reason: SDUPTHER

## 2019-06-27 ASSESSMENT — ENCOUNTER SYMPTOMS
BACK PAIN: 1
COUGH: 0
SHORTNESS OF BREATH: 0
GASTROINTESTINAL NEGATIVE: 1
WHEEZING: 0

## 2019-06-27 NOTE — PROGRESS NOTES
Pt presents stating that since he went home from the hospital he has had a couple of seizures nightly. Was started on Prozac that day. He called the dr office but no meds were changed. States that he is scared to see neurologist.   I spoke with med management and seizures can be caused from the Prozac. He doesn't need to be weaned off they stated because he hasnt been on it long. I paged Dr Zachary Nguyen covering for Dr Alize Lynch and she gave order to discontinue Prozac and resume normal Effexor and they will call him for appt to review his medications. Pt given instructions and voices understanding.  Recheck on 7-1

## 2019-06-27 NOTE — PROGRESS NOTES
Chief Complaint   Patient presents with    Seizures     8:30 this morning was last one     Discuss Medications     taken off prozac needs to go back on venlafaxine    Shortness of Breath     not getting better schedule PFT         Miguel Hurley.  here today for follow up on chronic medical problems, go over labs and/or diagnostic studies, and medication refills. Seizures (8:30 this morning was last one ); Discuss Medications (taken off prozac needs to go back on venlafaxine); and Shortness of Breath (not getting better schedule PFT)      HPI: Patient is here for seizures which has started 1 week before after he was started on Prozac. Patient reports he has about 15 seizures since then. The seizures are in the form of shaking, loss of consciousness denies any rolling of eyes and frothing. Patient was seen in the CHF clinic today and they called regarding his seizures. Patient was advised not to drive and was referred to neurologist.  Patient was taking Effexor in the past and that did not cause any seizures. Patient has quit driving reports his daughter gave him ride, he does not want to see neurologist because he is is scared of losing his license and reports he cannot afford that. Patient does not recall having any seizures in the past.    Depression uncontrolled, does not see any counselor, reports he is stressed and Effexor was not working. /62   Pulse 78   Ht 6' (1.829 m)   Wt (!) 411 lb (186.4 kg)   SpO2 99% Comment: resting @ RA  BMI 55.74 kg/m²    Body mass index is 55.74 kg/m². Wt Readings from Last 3 Encounters:   06/27/19 (!) 411 lb (186.4 kg)   06/27/19 (!) 411 lb 3.2 oz (186.5 kg)   06/19/19 (!) 400 lb 6.4 oz (181.6 kg)        []Negative depression screening.   PHQ Scores 6/19/2019 3/15/2019 2/22/2019 10/23/2018 9/18/2018 8/18/2017 6/23/2017   PHQ2 Score 4 6 2 6 3 2 5   PHQ9 Score 14 17 11 11 6 14 21      []1-4 = Minimal depression   []5-9 = Milddepression   []10-14 = Moderate depression    [x]15-19 = Moderately severe depression   []20-27 = Severe depression    Discussed testing with the patient and all questions fully answered. Hospital Outpatient Visit on 06/21/2019   Component Date Value Ref Range Status    Glucose 06/21/2019 153* 70 - 99 mg/dL Final    BUN 06/21/2019 27* 6 - 20 mg/dL Final    CREATININE 06/21/2019 1.30* 0.70 - 1.20 mg/dL Final    Bun/Cre Ratio 06/21/2019 NOT REPORTED  9 - 20 Final    Calcium 06/21/2019 9.2  8.6 - 10.4 mg/dL Final    Sodium 06/21/2019 140  135 - 144 mmol/L Final    Potassium 06/21/2019 4.7  3.7 - 5.3 mmol/L Final    Chloride 06/21/2019 105  98 - 107 mmol/L Final    CO2 06/21/2019 21  20 - 31 mmol/L Final    Anion Gap 06/21/2019 14  9 - 17 mmol/L Final    GFR Non- 06/21/2019 58* >60 mL/min Final    GFR  06/21/2019 >60  >60 mL/min Final    GFR Comment 06/21/2019        Final    Comment: Average GFR for 52-63 years old:   80 mL/min/1.73sq m  Chronic Kidney Disease:   <60 mL/min/1.73sq m  Kidney failure:   <15 mL/min/1.73sq m              eGFR calculated using average adult body mass.  Additional eGFR calculator available at:        [email or web address]            GFR Staging 06/21/2019 NOT REPORTED   Final         Most recent labs reviewed:     Lab Results   Component Value Date    WBC 11.3 (H) 06/14/2019    HGB 12.5 (L) 06/14/2019    HCT 38.0 (L) 06/14/2019    MCV 85.6 06/14/2019     06/14/2019       @BRIEFLAB(NA,K,CL,CO2,BUN,CREATININE,GLUCOSE,CALCIUM)@     Lab Results   Component Value Date    ALT 25 05/09/2019    AST 19 05/09/2019    ALKPHOS 61 05/09/2019    BILITOT 0.35 05/09/2019       Lab Results   Component Value Date    TSH 1.97 02/22/2019       Lab Results   Component Value Date    CHOL 161 09/20/2018    CHOL 172 08/16/2017    CHOL 134 12/26/2016     Lab Results   Component Value Date    TRIG 312 (H) 09/20/2018    TRIG 278 (H) 08/16/2017    TRIG 199 (H) Take 1 tablet by mouth 2 times daily 60 tablet 1    Lidocaine 4 % LOTN Apply topically      blood glucose test strips (ONE TOUCH ULTRA TEST) strip USE ONE STRIP TO TEST THREE TIMES A  strip 2    metolazone (ZAROXOLYN) 2.5 MG tablet Take 2.5 mg by mouth Twice a Week      PROAIR  (90 Base) MCG/ACT inhaler INHALE TWO PUFFS BY MOUTH EVERY 6 HOURS AS NEEDED FOR WHEEZING OR SHORTNESS OF BREATH 1 Inhaler 4    spironolactone (ALDACTONE) 50 MG tablet Take 1 tablet by mouth daily 90 tablet 3    vitamin D (CHOLECALCIFEROL) 1000 UNIT TABS tablet Take 1 tablet by mouth daily 90 tablet 3    docusate sodium (STOOL SOFTENER) 100 MG capsule Take 1 capsule by mouth 2 times daily 180 capsule 3    Handicap Placard MISC by Does not apply route Can't walk greater than 200 feet. Expires in 5 years. 1 each 0    tiZANidine (ZANAFLEX) 4 MG tablet TAKE ONE TABLET BY MOUTH EVERY 8 HOURS AS NEEDED FOR BACK PAIN 90 tablet 5    pantoprazole (PROTONIX) 40 MG tablet Take 1 tablet by mouth nightly 90 tablet 3    miconazole (MICOTIN) 2 % powder Apply topically 2 times daily. 45 g 1    Ferrous Sulfate (IRON) 325 (65 Fe) MG TABS Take 1 tablet by mouth daily 90 tablet 3    metoprolol tartrate (LOPRESSOR) 50 MG tablet Take 1 tablet by mouth 2 times daily 180 tablet 3    lisinopril (PRINIVIL;ZESTRIL) 5 MG tablet Take 1 tablet by mouth daily . Discontinued Lisinopril  10 mg 90 tablet 3    clopidogrel (PLAVIX) 75 MG tablet Take 1 tablet by mouth daily 90 tablet 2    bumetanide (BUMEX) 1 MG tablet Take 3 tablets by mouth 2 times daily 180 tablet 1    fluticasone-salmeterol (ADVAIR HFA) 230-21 MCG/ACT inhaler Inhale 2 puffs into the lungs 2 times daily 12 g 5    insulin regular human (HUMULIN R) 500 UNIT/ML concentrated injection vial Inject into the skin 3 times daily Indications: 50 in the morning 50 at lunch and 40 at dinner. U-500 insulin--Pt. Takes 50 u at breakfast and lunch and  40 u at supper time.       nitroGLYCERIN (NITROSTAT) 0.4 MG SL tablet Place 1 tablet under the tongue every 5 minutes as needed for Chest pain 25 tablet 3    fluticasone (CUTIVATE) 0.05 % cream Apply topically 2 times daily       fluticasone (FLONASE) 50 MCG/ACT nasal spray 2 sprays by Nasal route daily (Patient taking differently: 2 sprays by Nasal route daily as needed (sinus symptoms) ) 1 Bottle 3    Melatonin 10 MG TABS Take 10 mg by mouth nightly as needed (insomnia) 90 tablet 1    COMFORT ASSIST INSULIN SYRINGE 31G X \" 1 ML MISC       aspirin 81 MG EC tablet Take 81 mg by mouth daily.  Spacer/Aero Chamber Mouthpiece MISC 1 each by Does not apply route once as needed (to be used with his inhalers) 1 each 0     No current facility-administered medications for this visit.               Social History     Socioeconomic History    Marital status:      Spouse name: Not on file    Number of children: Not on file    Years of education: Not on file    Highest education level: Not on file   Occupational History    Occupation: disability     Comment: unemployed   Social Needs    Financial resource strain: Not on file    Food insecurity:     Worry: Not on file     Inability: Not on file   [employer] needs:     Medical: Not on file     Non-medical: Not on file   Tobacco Use    Smoking status: Former Smoker     Packs/day: 2.00     Years: 33.00     Pack years: 66.00     Types: Cigarettes     Start date: 1985     Last attempt to quit: 2019     Years since quittin.1    Smokeless tobacco: Former User     Types: Snuff     Quit date: 1995   Substance and Sexual Activity    Alcohol use: No     Alcohol/week: 0.0 oz    Drug use: Yes     Types: Marijuana    Sexual activity: Not Currently   Lifestyle    Physical activity:     Days per week: Not on file     Minutes per session: Not on file    Stress: Not on file   Relationships    Social connections:     Talks on phone: Not on file     Gets together: Not on file Attends [employer] service: Not on file     Active member of club or organization: Not on file     Attends meetings of clubs or organizations: Not on file     Relationship status: Not on file    Intimate partner violence:     Fear of current or ex partner: Not on file     Emotionally abused: Not on file     Physically abused: Not on file     Forced sexual activity: Not on file   Other Topics Concern    Not on file   Social History Narrative    Middle of possible separation 2016          Counseling given: Yes        Family History   Problem Relation Age of Onset    Heart Disease Mother          age 64 from [place]   Kelly High Blood Pressure Mother     Diabetes Mother     High Blood Pressure Father          age 80 from CKD and Lung Fibrosis    Kidney Disease Father     Heart Disease Sister     Heart Attack Sister     Obesity Sister     Diabetes Sister              -rest of complaints with corresponding details per ROS    The patient's past medical, surgical, social, and family history as well as his current medications and allergies were reviewed as documented intoday's encounter. Review of Systems   Constitutional: Positive for activity change. Negative for appetite change, fatigue and unexpected weight change. HENT: Negative. Respiratory: Negative for cough, shortness of breath and wheezing. Cardiovascular: Positive for leg swelling. Negative for chest pain. Gastrointestinal: Negative. Genitourinary: Negative for difficulty urinating, hematuria and urgency. Musculoskeletal: Positive for arthralgias, back pain, gait problem and joint swelling. Neurological: Positive for seizures and weakness. Negative for dizziness and headaches. Psychiatric/Behavioral: Positive for agitation, behavioral problems, decreased concentration, dysphoric mood and sleep disturbance. Negative for suicidal ideas. The patient is nervous/anxious. The patient is not hyperactive.             Physical Exam   Constitutional: He is oriented to person, place, and time. He appears well-developed and well-nourished. HENT:   Head: Normocephalic and atraumatic. Cardiovascular: Normal rate, regular rhythm and normal heart sounds. Pulmonary/Chest: Effort normal. He has decreased breath sounds in the right lower field and the left lower field. He has no wheezes. He has no rhonchi. He has no rales. Musculoskeletal:        Lumbar back: He exhibits decreased range of motion and tenderness. He exhibits no edema. Right lower leg: He exhibits edema. Left lower leg: He exhibits edema. Neurological: He is alert and oriented to person, place, and time. Gait abnormal.   Psychiatric: His mood appears anxious. His speech is rapid and/or pressured. He is agitated. Thought content is not paranoid and not delusional. Cognition and memory are normal. He expresses impulsivity. He exhibits a depressed mood. He expresses no homicidal and no suicidal ideation. He expresses no suicidal plans and no homicidal plans. Nursing note and vitals reviewed. ASSESSMENT AND PLAN      1. Seizures ([address]  Discontinue Prozac, referral to neurologist printed discussed in patient with detail that you need to see neurologist and have EEG done. Transfer form signed to help him with appointments. Patient agreed, close follow-up in 2 weeks. - EEG; Future    2. MDD (major depressive disorder), recurrent severe, without psychosis ([address]  Restarted back on Effexor, all SSRI on SNRI have side effects of seizures. Close follow-up in 2 weeks. Referred to behavioral therapy  - venlafaxine (EFFEXOR XR) 75 MG extended release capsule; Take 1 capsule by mouth daily  Dispense: 30 capsule; Refill: 3      Orders Placed This Encounter   Procedures    EEG     Standing Status:   Future     Standing Expiration Date:   8/26/2019         There are no discontinued medications.     Miguel received counseling on the following healthy behaviors: nutrition, exercise and medication adherence  Reviewed prior labs and health maintenance  Continue current medications, diet and exercise. Discussed use, benefit, and side effects of prescribed medications. Barriers to medication compliance addressed. Patient given educational materials - see patient instructions  Was a self-tracking handout given in paper form or via [employer]? Yes    Requested Prescriptions     Signed Prescriptions Disp Refills    venlafaxine (EFFEXOR XR) 75 MG extended release capsule 30 capsule 3     Sig: Take 1 capsule by mouth daily       All patient questions answered. Patient voiced understanding. Quality Measures    Body mass index is 55.74 kg/m². Elevated. Weight control planned discussed Healthy diet and regular exercise. BP: 121/62 Blood pressure is normal. Treatment plan consists of No treatment change needed. Lab Results   Component Value Date    LDLCHOLESTEROL 71 09/20/2018    LDLDIRECT 72 03/13/2013    (goal LDL reduction with dx if diabetes is 50% LDL reduction)      PHQ Scores 6/19/2019 3/15/2019 2/22/2019 10/23/2018 9/18/2018 8/18/2017 6/23/2017   PHQ2 Score 4 6 2 6 3 2 5   PHQ9 Score 14 17 11 11 6 14 21     Interpretation of Total Score Depression Severity: 1-4 = Minimal depression, 5-9 = Mild depression, 10-14 = Moderate depression, 15-19 = Moderately severe depression, 20-27 = Severe depression    The patient'spast medical, surgical, social, and family history as well as his   current medications and allergies were reviewed as documented in today's encounter. Medications, labs, diagnostic studies, consultations andfollow-up as documented in this encounter. Return in about 2 years (around 6/27/2021), or with pcp , for [facility] with [facility] for worsening of  depression . Patient wasseen with total face to face time of 15 minutes. More than 50% of this visit was counseling and education.        Future Appointments   Date Time Provider William Hernandez   7/2/2019

## 2019-06-27 NOTE — PROGRESS NOTES
Visit Information    Have you changed or started any medications since your last visit including any over-the-counter medicines, vitamins, or herbal medicines? no   Are you having any side effects from any of your medications? -  no  Have you stopped taking any of your medications? Is so, why? -  no    Have you seen any other physician or provider since your last visit? Yes - Records Obtained  Have you had any other diagnostic tests since your last visit? No  Have you been seen in the emergency room and/or had an admission to a hospital since we last saw you? Yes - Records Obtained  Have you had your routine dental cleaning in the past 6 months? no    Have you activated your [employer] account? If not, what are your barriers?  Yes     Patient Care Team:  Jalen Hung MD as PCP - General (Family Medicine)  Jalen Hung MD as PCP - [facility]  Mateo Garcia MD as Consulting Physician (Otolaryngology)  Herlinda Thorpe MD as Consulting Physician (Endocrinology)  Jigar Wood DO as Consulting Physician (Cardiology)  [place] Christine DPM as Surgeon (Podiatry)  Krystina Barnhart MD as Consulting Physician (Ophthalmology)  Gomez Nix MD as Consulting Physician (Nephrology)  David Haney MD as Consulting Physician (Pulmonology)  Leobardo Raygoza MD as Consulting Physician (Pulmonology)  Kayla Chávez MD as Surgeon (Vascular Surgery)  oHla Easton MD as Consulting Physician (Gastroenterology)  [address] as Pharmacist (Pharmacist)  Leobardo Raygoza MD as Consulting Physician (Pulmonology)    Medical History Review  Past Medical, Family, and Social History reviewed and does contribute to the patient presenting condition    Health Maintenance   Topic Date Due    Shingles Vaccine (1 of 2) 12/30/2014    Colon cancer screen colonoscopy  11/03/2017    Hepatitis B Vaccine (2 of 3 - Risk 3-dose series) 06/26/2019    A1C test (Diabetic or Prediabetic) 07/15/2019    Lipid screen  09/20/2019    Diabetic retinal exam  10/24/2019    Diabetic foot exam  05/02/2020    Potassium monitoring  06/21/2020    Creatinine monitoring  06/21/2020    DTaP/Tdap/Td vaccine (2 - Td) 09/12/2028    Flu vaccine  Completed    Pneumococcal 0-64 years Vaccine  Completed    Hepatitis C screen  Completed    HIV screen  Completed

## 2019-06-28 ENCOUNTER — TELEPHONE (OUTPATIENT)
Dept: FAMILY MEDICINE CLINIC | Age: 54
End: 2019-06-28

## 2019-07-01 ENCOUNTER — HOSPITAL ENCOUNTER (OUTPATIENT)
Dept: OTHER | Age: 54
Discharge: HOME | End: 2019-07-01
Payer: COMMERCIAL

## 2019-07-01 VITALS
RESPIRATION RATE: 24 BRPM | WEIGHT: 315 LBS | SYSTOLIC BLOOD PRESSURE: 106 MMHG | BODY MASS INDEX: 55.61 KG/M2 | HEART RATE: 74 BPM | DIASTOLIC BLOOD PRESSURE: 60 MMHG | OXYGEN SATURATION: 97 %

## 2019-07-01 PROCEDURE — 99211 OFF/OP EST MAY X REQ PHY/QHP: CPT

## 2019-07-01 NOTE — PROGRESS NOTES
No signs of failure. Labs were drawn last week. Pt is feeling better this week. Has had 2 seizures since discontinuing the Prozac but says they were much lighter. States that he feels irritable though and frigidity. Is seeing med management tomorrow am. Pt also saw PCP last week. EEG is scheduled. He isnt sure about seeing a neurologist or psychiatrist.     Will discuss plan with med management.

## 2019-07-02 ENCOUNTER — HOSPITAL ENCOUNTER (OUTPATIENT)
Dept: PHARMACY | Age: 54
Setting detail: THERAPIES SERIES
Discharge: HOME | End: 2019-07-02
Payer: COMMERCIAL

## 2019-07-02 PROCEDURE — 99212 OFFICE O/P EST SF 10 MIN: CPT

## 2019-07-02 NOTE — PROGRESS NOTES
Cellulitis of both lower extremities 5/25/2017    Cellulitis of leg, left 7/20/2017    CHF (congestive heart failure), NYHA class III ([facility]) 8/14/2013    Chronic back pain     Chronic headaches     was referred to neuro, testing scheduled    Chronic kidney disease     Chronic ulcer of left leg, with fat layer exposed ([address] 2/22/2019    COPD (chronic obstructive pulmonary disease) ([address]     COPD exacerbation ([address] 11/2/2016    Diabetic neuropathy ([address] 8/14/2013    Displacement of lumbar intervertebral disc without myelopathy 6/13/2013    Ear infection     RIGHT    Facial cellulitis 2012    Fall 3/25/2017    GERD (gastroesophageal reflux disease)     Head injury     Hearing loss in right ear     pencil pierced ear as a child    Hepatic steatosis 12/3/2015    History of general anesthesia complication     has woke up during surgery under anesthesia    History of rib fracture 12/3/2015    Chronic     Hyperlipidemia     Hypertension     Insomnia     Intolerance of continuous positive airway pressure (CPAP) ventilation 7/20/2017    Mastoiditis of left side     Mixed conductive and sensorineural hearing loss of both ears 1/10/2017    Per ENT    Mixed type COPD (chronic obstructive pulmonary disease) ([address]     Moderate recurrent major depression ([address] 10/2/2016    Morbid obesity with BMI of 45.0-49.9, adult ([address] 6/16/2015    Neuropathy     Obesity     (BMI 35.0-39.9 without comorbidity)    Open wound of groin 12/19/2018    BARBY on CPAP     Osteoarthritis     Otitis externa of left ear     Pancreatitis chronic     Renal insufficiency     proteinuria    S/P cardiac cath 04/23/2018    Severe depression ([address] 9/25/2013    Spinal stenosis of lumbar region without neurogenic claudication 1/6/2016    MRI lumbar 12/30/15 L3-L4: There is broad-based bulging disc which appears protruding left laterally causing flattening of the ventral thecal sac.  In addition, there is facet arthropathy with mild hypertrophic changes. There is borderline central canal stenosis with  evidence of moderate left neural foraminal narrowing and mild right neural foramina narrowing.   L4-L5: There is broad-based protrud    Syncope 2017    Tinnitus of both ears 1/10/2017    Per ENT    Type 2 diabetes mellitus with stage 3 chronic kidney disease, with long-term current use of insulin ([facility] 2016    due to underlying condition with hyperosmolarity without coma    Type II or unspecified type diabetes mellitus without mention of complication, not stated as uncontrolled     uncontrolled    Wears glasses     for reading    Wound of left leg 2019       Social History:    Social History     Tobacco Use    Smoking status: Former Smoker     Packs/day: 2.00     Years: 33.00     Pack years: 66.00     Types: Cigarettes     Start date: 1985     Last attempt to quit: 2019     Years since quittin.2    Smokeless tobacco: Former User     Types: Snuff     Quit date: 1995   Substance Use Topics    Alcohol use: No     Alcohol/week: 0.0 oz       Pertinent Labs:    Lab Results   Component Value Date    LABA1C 7.2 2019    LABA1C 8.2 (H) 04/15/2019    LABA1C 10.7 2019     Lab Results   Component Value Date    CHOL 161 2018    TRIG 312 (H) 2018    HDL 28 (L) 2018     Lab Results   Component Value Date    CREATININE 1.30 (H) 2019    BUN 27 (H) 2019     2019    K 4.7 2019     2019    CO2 21 2019     Lab Results   Component Value Date    ALT 25 2019       Weight:  Wt Readings from Last 3 Encounters:   19 (!) 410 lb (186 kg)   19 (!) 411 lb 3.2 oz (186.5 kg)   19 (!) 411 lb (186.4 kg)       Current medications:  Prior to Admission medications    Medication Sig Start Date End Date Taking?  Authorizing Provider   venlafaxine (EFFEXOR XR) 75 MG extended release capsule Take 1 capsule by mouth daily 19   Gabriel Ye Yes  Annual urine albumin and serum creatinine: Yes    Statin: Yes    Appropriate?: Yes  Changes made:     ACE/ARB: Yes  Appropriate?: Yes  Changes made:     Aspirin: Yes  Appropriate?: Yes  Changes made:     Eating patterns:    [x]  My plate    []  Mediterranean diet   []  Low sodium   []  DASH diet   [x]  Portion control   [x]  Reduced calorie    []  Fast food / Restaurant  []  High glycemic index foods   []  Sugary beverages   []  Other     Comment: Patient states he is eating significantly less and using smaller plates. Feels satisfied with smaller quantities. Current Medications Affecting Diabetes:  Humulin R 500    Compliant: yes  Barriers to medication therapy: none    Medications attempted in the past:  Metformin - cardiologist took him off but patient unsure why  Januvia - PCP took him off but patient unsure why    Recent exacerbations / new problems:  Continued elevated but improving A1C    Last office dictation reviewed: yes        type 2 DM under improving control as evidenced by A1C 8.2 on 4/15/19    Plan       Counseling at today's visit:   -Continued monitoring of blood glucose with documentation on log  -Continued Chantix for two more weeks to finish total of 12 weeks.   -Resume exercise with increase as comfortable. .  -Continued dose of insulin:0.5ml with breakfast and lunch and 0.4mg with dinner.  -Get EEG and follow up with neurologist and counselor if he decides.        Physician Follow-up:  Every 3 months    Medication Management Follow-up:   Diabetes Service   3 weeks    Electronically signed by Ena Mtz RPH on 7/2/2019 at 5:45 PM

## 2019-07-11 ENCOUNTER — HOSPITAL ENCOUNTER (OUTPATIENT)
Dept: PULMONOLOGY | Age: 54
Discharge: HOME | End: 2019-07-11
Payer: COMMERCIAL

## 2019-07-11 DIAGNOSIS — J44.9 MIXED TYPE COPD (CHRONIC OBSTRUCTIVE PULMONARY DISEASE) (HCC): ICD-10-CM

## 2019-07-11 PROCEDURE — 94729 DIFFUSING CAPACITY: CPT

## 2019-07-11 PROCEDURE — 94664 DEMO&/EVAL PT USE INHALER: CPT

## 2019-07-11 PROCEDURE — 94727 GAS DIL/WSHOT DETER LNG VOL: CPT

## 2019-07-11 PROCEDURE — 94728 AIRWY RESIST BY OSCILLOMETRY: CPT

## 2019-07-11 PROCEDURE — 94060 EVALUATION OF WHEEZING: CPT

## 2019-07-11 PROCEDURE — 94726 PLETHYSMOGRAPHY LUNG VOLUMES: CPT

## 2019-07-11 PROCEDURE — 6360000002 HC RX W HCPCS: Performed by: FAMILY MEDICINE

## 2019-07-11 RX ORDER — ALBUTEROL SULFATE 2.5 MG/3ML
2.5 SOLUTION RESPIRATORY (INHALATION) ONCE
Status: COMPLETED | OUTPATIENT
Start: 2019-07-11 | End: 2019-07-11

## 2019-07-11 RX ADMIN — ALBUTEROL SULFATE 2.5 MG: 2.5 SOLUTION RESPIRATORY (INHALATION) at 12:24

## 2019-07-12 ENCOUNTER — HOSPITAL ENCOUNTER (OUTPATIENT)
Dept: NEUROLOGY | Age: 54
Discharge: HOME | End: 2019-07-12
Payer: COMMERCIAL

## 2019-07-12 DIAGNOSIS — R56.9 SEIZURES (HCC): ICD-10-CM

## 2019-07-12 PROCEDURE — 95816 EEG AWAKE AND DROWSY: CPT | Performed by: PSYCHIATRY & NEUROLOGY

## 2019-07-12 PROCEDURE — 95819 EEG AWAKE AND ASLEEP: CPT

## 2019-07-22 ENCOUNTER — TELEPHONE (OUTPATIENT)
Dept: FAMILY MEDICINE CLINIC | Age: 54
End: 2019-07-22

## 2019-07-22 DIAGNOSIS — L03.111 CELLULITIS OF RIGHT AXILLA: Primary | ICD-10-CM

## 2019-07-22 RX ORDER — CLINDAMYCIN HYDROCHLORIDE 300 MG/1
300 CAPSULE ORAL 4 TIMES DAILY
Qty: 40 CAPSULE | Refills: 0 | Status: SHIPPED | OUTPATIENT
Start: 2019-07-22 | End: 2019-08-01

## 2019-07-22 RX ORDER — CEPHALEXIN 500 MG/1
500 CAPSULE ORAL 4 TIMES DAILY
Qty: 40 CAPSULE | Refills: 0 | Status: SHIPPED | OUTPATIENT
Start: 2019-07-22 | End: 2019-07-22

## 2019-07-22 RX ORDER — GREEN TEA/HOODIA GORDONII 315-12.5MG
1 CAPSULE ORAL 2 TIMES DAILY
Qty: 60 TABLET | Refills: 0 | Status: SHIPPED | OUTPATIENT
Start: 2019-07-22 | End: 2019-08-21

## 2019-07-22 NOTE — TELEPHONE ENCOUNTER
Please call pharmacy and cancel prescription for Keflex    Please let the patient know to  prescription from pharmacy. Requested Prescriptions     Signed Prescriptions Disp Refills    clindamycin (CLEOCIN) 300 MG capsule 40 capsule 0     Sig: Take 1 capsule by mouth 4 times daily for 10 days     Authorizing Provider: Vivi Murray    Lactobacillus (PROBIOTIC ACIDOPHILUS) TABS 60 tablet 0     Sig: Take 1 tablet by mouth 2 times daily     Authorizing Provider: Sylvia [address] Estelaantoniajuan Cadena [facility] [phone] Willow Wildelisa [phone]  [facility] [address]  Phone: [phone] Fax: [phone]      Thank you!         FYI    Future Appointments   Date Time Provider William Hernandez   7/25/2019  8:00 AM [facility] CHF CLINIC RM 1 STCZ CHFCLIN None   7/25/2019  8:50 AM CZ MEDICATION MGMT [facility] MED MGMT [facility]   7/31/2019  9:00 AM SCHEDULE, SOHAM MONTERROSO  [place] [facility]   8/7/2019 11:00 AM Bladimir Flores MD [facility]   8/23/2019  9:00 AM Troy Cruz MD Resp Spec [facility]   8/30/2019 10:00 AM Bladimir Flores MD [facility]       Controlled Substances Monitoring:

## 2019-07-22 NOTE — TELEPHONE ENCOUNTER
[facility] nurse called , she was seeing patient for LT Axilla infections and that is healed up nicely but now  there is redness and pain on RT axilla.    Please Advice

## 2019-07-25 ENCOUNTER — HOSPITAL ENCOUNTER (OUTPATIENT)
Dept: OTHER | Age: 54
Discharge: HOME | End: 2019-07-25
Payer: COMMERCIAL

## 2019-07-25 ENCOUNTER — HOSPITAL ENCOUNTER (OUTPATIENT)
Dept: PHARMACY | Age: 54
Setting detail: THERAPIES SERIES
Discharge: HOME | End: 2019-07-25
Payer: COMMERCIAL

## 2019-07-25 VITALS
BODY MASS INDEX: 54.67 KG/M2 | RESPIRATION RATE: 22 BRPM | SYSTOLIC BLOOD PRESSURE: 104 MMHG | OXYGEN SATURATION: 99 % | DIASTOLIC BLOOD PRESSURE: 70 MMHG | HEART RATE: 67 BPM | WEIGHT: 315 LBS

## 2019-07-25 LAB
ANION GAP SERPL CALCULATED.3IONS-SCNC: 15 MMOL/L (ref 9–17)
BNP INTERPRETATION: NORMAL
BUN BLDV-MCNC: 40 MG/DL (ref 6–20)
CHLORIDE BLD-SCNC: 100 MMOL/L (ref 98–107)
CO2: 24 MMOL/L (ref 20–31)
CREAT SERPL-MCNC: 1.83 MG/DL (ref 0.7–1.2)
GFR AFRICAN AMERICAN: 47 ML/MIN
GFR NON-AFRICAN AMERICAN: 39 ML/MIN
GFR SERPL CREATININE-BSD FRML MDRD: ABNORMAL ML/MIN/{1.73_M2}
GFR SERPL CREATININE-BSD FRML MDRD: ABNORMAL ML/MIN/{1.73_M2}
POTASSIUM SERPL-SCNC: 4.1 MMOL/L (ref 3.7–5.3)
PRO-BNP: 120 PG/ML
SODIUM BLD-SCNC: 139 MMOL/L (ref 135–144)

## 2019-07-25 PROCEDURE — 83880 ASSAY OF NATRIURETIC PEPTIDE: CPT

## 2019-07-25 PROCEDURE — 80051 ELECTROLYTE PANEL: CPT

## 2019-07-25 PROCEDURE — 84520 ASSAY OF UREA NITROGEN: CPT

## 2019-07-25 PROCEDURE — 82565 ASSAY OF CREATININE: CPT

## 2019-07-25 PROCEDURE — 36415 COLL VENOUS BLD VENIPUNCTURE: CPT

## 2019-07-25 PROCEDURE — 99211 OFF/OP EST MAY X REQ PHY/QHP: CPT

## 2019-07-25 PROCEDURE — 99212 OFFICE O/P EST SF 10 MIN: CPT

## 2019-07-25 NOTE — PROGRESS NOTES
Diabetic Medication Management   [address] [place], [address] [address] [facility]  Phone: [phone]  Fax: [phone]    NAME: Rickie Grande. MEDICAL RECORD NUMBER:  [id number]  AGE: 46 y.o. GENDER: male  : 1965  EPISODE DATE:  2019       Mr. Quynh Andres was referred to [facility] Medication Management Services by Dr. Domenica Reaves, Special instructions include: titrate all medications (as defined in clinic's policy and procedure)    Goals per referral:   Fasting blood glucose: < 130  Peak postprandial glucose: < 180  A1C: < 7    Other goals:  Blood pressure goal: 130/80  Weight loss goal (~10%): Target weight  380 to be reached by date: 2019 (3-6 months)  Physical Activity goal:    10 minutes three times per week to be reached by date: 2019 (3-6 months)  Smoking Cessation   Quit Date: Patient stopped smoking on 19 with Chantix  Cholesterol at target:   Date: Yes LDL 71 No HDL 28 Trig 312 (18)  Annual eye exam:    Date: Yes - every 3 months  Comprehensive annual foot exam:   Date: Yes - every 2 months for regular foot care (Dr. Vladimir Turner)  Annual urine Albumin and serum creatinine:   Date: Yes 15 mg/L, SrCr 1.74 mg/dL        Subjective   Mr. Quynh Andres is a 46 y.o. male here for the Diabetes Service for self-management education, medication review including over the counter medications and herbal products, overall well-being assessment, transition of care and any needed adjustments with updates and recommendations communicated to the referring physician. Patient's name and  verified. Patient Findings:    No futher seizures per patient. Still somewhat having buzzing/tingling feeling on the right side of the head. Feels like mild type of headache. EEG per patient came back normal.  Has not seen neurologist as of yet. Finished Chantix about a week ago and has continued to not smoke. Patient has not plans to smoke every again.   New nebulizer still working well for patient. Encouragement and congratulations given to patient for continuing abstinence from smoking. States SOB much better today. Was able to walk from truck all the way back to our office without stopping. Continuing with all inhalers and using spacer. Patient did buy window air conditioner. Patient has cellulitis back but no under both arms (armpits). Just starting and not as bad per patient. Patient started on Clindamycin. Patient was also to start probiotic but not covered under insurance. Pharmacy trying to work with him to find agent to be covered. Encouraged patient to eat yogurt (low carb/low sugar) while on antibiotics. Lower abdominal pain has not changed. Patient has not follow up on this as he has too many other things going on . Encouraged patient to discuss with physician and follow up as needed. Patient has not been sleeping well as his mind is just racing thinking about family issues/personal issues. Encouraged patient to look toward positive and consider counseling. Patient not willing to try counseling as does not think it will help at all. Explained that just like insulin is combined with diet and heart failure medications are combined with fluid and salt restriction, medications for depression work best when paired with counseling. Offered support. Next appt with endocrinologist is August 9th.  []  Missed doses   []  Emergency Room Visit    [x]  Medication changes  []  Hospitalization   []  Diet changes   [x]  Acute illness   [x]  Activity changes      Symptoms of hypoglycemia - Had two incidences of hypoglycemia (59, 61) since last visit. In both instances patient states he took his insulin and did not eat.   Patient felt unwell and took blood sugar and ate      []  None    [x]  Shakiness    [x]  Lightheadedness or dizziness   [x]  Confusion      []  Sweating   [x]  Other-nausea       Symptoms of hyperglycemia -.    [x]  None   []  Frequent MD   Lancet Devices (LANCING DEVICE) MISC Provide patient with lancing device appropriate for his machine/lancing needles. 6/4/19   Yareli Sol MD   pravastatin (PRAVACHOL) 40 MG tablet Take 1 tablet by mouth nightly 5/22/19   Yareli Sol MD   varenicline (CHANTIX) 1 MG tablet Take 1 tablet by mouth 2 times daily 5/2/19   Yareli Sol MD   Lidocaine 4 % LOTN Apply topically    Historical Provider, MD   Spacer/Aero Chamber Mouthpiece MISC 1 each by Does not apply route once as needed (to be used with his inhalers) 4/15/19 6/4/19  Yareli Sol MD   blood glucose test strips (ONE TOUCH ULTRA TEST) strip USE ONE STRIP TO TEST THREE TIMES A DAY 4/4/19   Yareli Sol MD   metolazone (ZAROXOLYN) 2.5 MG tablet Take 2.5 mg by mouth Twice a Week    Historical Provider, MD   PROAIR  (90 Base) MCG/ACT inhaler INHALE TWO PUFFS BY MOUTH EVERY 6 HOURS AS NEEDED FOR WHEEZING OR SHORTNESS OF BREATH 3/27/19   Meagan Goodrich MD   spironolactone (ALDACTONE) 50 MG tablet Take 1 tablet by mouth daily 3/18/19   Yareli Sol MD   vitamin D (CHOLECALCIFEROL) 1000 UNIT TABS tablet Take 1 tablet by mouth daily 2/25/19   Yareli Sol MD   docusate sodium (STOOL SOFTENER) 100 MG capsule Take 1 capsule by mouth 2 times daily 2/25/19   Yareli Sol MD   Handicap Placard MISC by Does not apply route Can't walk greater than 200 feet. Expires in 5 years. 2/13/19   Yareli Sol MD   tiZANidine (ZANAFLEX) 4 MG tablet TAKE ONE TABLET BY MOUTH EVERY 8 HOURS AS NEEDED FOR BACK PAIN 1/23/19   Yareli Sol MD   pantoprazole (PROTONIX) 40 MG tablet Take 1 tablet by mouth nightly 1/21/19   Yareli Sol MD   miconazole (MICOTIN) 2 % powder Apply topically 2 times daily.  12/20/18   Aleksandra Soria MD   Ferrous Sulfate (IRON) 325 (65 Fe) MG TABS Take 1 tablet by mouth daily 11/19/18   Yareli Sol MD   metoprolol tartrate (LOPRESSOR) 50 MG tablet Take 1 tablet by mouth 2 Patient has blood glucose log with him today. Typical fasting (before breakfast) between 6:30/7am running  . Pre lunch between 12-3pm running 98--179. Pre dinner at 6-8pm running 108-201. Patient to continue monitoring blood sugar and noting level as well as how much insulin he takes each day. Blood glucose trends noted: see above    Assessment     A1c at goal: No but improvin.2 (5/3/19)  Blood Pressure at goal: Yes: 106/60 on 19  Weight at goal: No: 403lbs but decreasing. Physical activity: 5 minutes twice a week. Physical activity at goal: No:  Patient admits to not feeling like it but does exercise once in a while. Uses stationary bike more as a way to punish himself rather than trying to improve his health. Considering working with cardiac rehab program.  Patient plans to call back next week and intends to start. Patient encouraged. Smoking Cessation: Yes: Has finished 12 weeks of Chantix and has not smoked. Motivated to remain abstinent. Cholesterol at target: No: LDL 71, HDL28, TRIG 312 (18)  Annual eye exam completed: Yes  Comprehensive Foot Exam Completed: Yes  Annual urine albumin and serum creatinine: Yes    Statin: Yes    Appropriate?: Yes  Changes made:     ACE/ARB: Yes  Appropriate?: Yes  Changes made:     Aspirin: Yes  Appropriate?: Yes  Changes made:     Eating patterns:    [x]  My plate    []  Mediterranean diet   []  Low sodium   []  DASH diet   [x]  Portion control   [x]  Reduced calorie    []  Fast food / Restaurant  []  High glycemic index foods   []  Sugary beverages   []  Other     Comment: Patient states he is eating significantly less and using smaller plates. Feels satisfied with smaller quantities.      Current Medications Affecting Diabetes:  Humulin R 500    Compliant: yes  Barriers to medication therapy: none    Medications attempted in the past:  Metformin - cardiologist took him off but patient unsure why  Januvia - PCP took him off but patient unsure why    Recent exacerbations / new problems:  Continued elevated but improving A1C    Last office dictation reviewed: yes        type 2 DM under improving control as evidenced by A1C 8.2 on 4/15/19    Plan       Counseling at today's visit:   -Continued monitoring of blood glucose with documentation on log  --Resume exercise with increase as comfortable. .  -Continued dose of insulin:0.5ml with breakfast and lunch and 0.4mg with dinner.         Physician Follow-up:  Every 3 months    Medication Management Follow-up:   Diabetes Service   3 weeks    Electronically signed by Franki Allred RPH on 7/25/2019 at 8:51 AM

## 2019-07-25 NOTE — PROGRESS NOTES
Pt appears tired and depressed again. States that he feels depressed because of all his health issues and is now dealing with skin infections again in both axilla. States that it is very uncomfortable. Encouraged him to ask PCP about following with infection control dr again. Pt agrees. States that last week he couldn't breathe and felt very frustrated with his weight and took 2 tablets of Zaroxolyn (instead of one) three times last week. Does however feel better now with fluid. Labs drawn. Pt instructed to take the one tablet twice a week and may take 3 tabs a week but no more than that without contacting here or  first. Pt voices understanding. Support and encouragement offered. Behavioral Health services offered but pt declines at this time.

## 2019-07-28 ENCOUNTER — PATIENT MESSAGE (OUTPATIENT)
Dept: FAMILY MEDICINE CLINIC | Age: 54
End: 2019-07-28

## 2019-07-28 DIAGNOSIS — E11.40 TYPE 2 DIABETES MELLITUS WITH DIABETIC NEUROPATHY (HCC): ICD-10-CM

## 2019-07-28 DIAGNOSIS — M96.1 POSTLAMINECTOMY SYNDROME OF LUMBAR REGION: ICD-10-CM

## 2019-07-28 DIAGNOSIS — G89.29 CHRONIC BACK PAIN GREATER THAN 3 MONTHS DURATION: ICD-10-CM

## 2019-07-28 DIAGNOSIS — M54.9 CHRONIC BACK PAIN GREATER THAN 3 MONTHS DURATION: ICD-10-CM

## 2019-07-28 DIAGNOSIS — M54.42 CHRONIC MIDLINE LOW BACK PAIN WITH LEFT-SIDED SCIATICA: Primary | ICD-10-CM

## 2019-07-28 DIAGNOSIS — G89.29 CHRONIC MIDLINE LOW BACK PAIN WITH LEFT-SIDED SCIATICA: Primary | ICD-10-CM

## 2019-07-28 DIAGNOSIS — N18.30 CKD (CHRONIC KIDNEY DISEASE) STAGE 3, GFR 30-59 ML/MIN (HCC): ICD-10-CM

## 2019-07-28 DIAGNOSIS — M51.36 DDD (DEGENERATIVE DISC DISEASE), LUMBAR: ICD-10-CM

## 2019-07-29 RX ORDER — LANCETS
EACH MISCELLANEOUS
Qty: 300 EACH | Refills: 3 | Status: SHIPPED | OUTPATIENT
Start: 2019-07-29 | End: 2019-11-07 | Stop reason: SDUPTHER

## 2019-07-29 RX ORDER — OXYCODONE HYDROCHLORIDE 10 MG/1
10 TABLET ORAL EVERY 8 HOURS PRN
Qty: 90 TABLET | Refills: 0 | Status: SHIPPED | OUTPATIENT
Start: 2019-07-29 | End: 2019-08-27 | Stop reason: SDUPTHER

## 2019-07-31 ENCOUNTER — NURSE ONLY (OUTPATIENT)
Dept: FAMILY MEDICINE CLINIC | Age: 54
End: 2019-07-31
Payer: COMMERCIAL

## 2019-07-31 VITALS — DIASTOLIC BLOOD PRESSURE: 60 MMHG | SYSTOLIC BLOOD PRESSURE: 138 MMHG

## 2019-07-31 DIAGNOSIS — I10 ESSENTIAL HYPERTENSION: Primary | ICD-10-CM

## 2019-07-31 DIAGNOSIS — Z23 NEED FOR HEPATITIS B VACCINATION: ICD-10-CM

## 2019-07-31 PROCEDURE — G0010 ADMIN HEPATITIS B VACCINE: HCPCS | Performed by: FAMILY MEDICINE

## 2019-07-31 PROCEDURE — 90746 HEPB VACCINE 3 DOSE ADULT IM: CPT | Performed by: FAMILY MEDICINE

## 2019-08-07 ENCOUNTER — OFFICE VISIT (OUTPATIENT)
Dept: FAMILY MEDICINE CLINIC | Age: 54
End: 2019-08-07
Payer: COMMERCIAL

## 2019-08-07 DIAGNOSIS — I10 ESSENTIAL HYPERTENSION: ICD-10-CM

## 2019-08-07 DIAGNOSIS — M54.42 CHRONIC MIDLINE LOW BACK PAIN WITH LEFT-SIDED SCIATICA: ICD-10-CM

## 2019-08-07 DIAGNOSIS — Z79.4 TYPE 2 DIABETES MELLITUS WITH DIABETIC POLYNEUROPATHY, WITH LONG-TERM CURRENT USE OF INSULIN (HCC): Primary | ICD-10-CM

## 2019-08-07 DIAGNOSIS — I50.32 CHRONIC DIASTOLIC CONGESTIVE HEART FAILURE (HCC): ICD-10-CM

## 2019-08-07 DIAGNOSIS — F33.2 MDD (MAJOR DEPRESSIVE DISORDER), RECURRENT SEVERE, WITHOUT PSYCHOSIS (HCC): ICD-10-CM

## 2019-08-07 DIAGNOSIS — G89.29 CHRONIC MIDLINE LOW BACK PAIN WITH LEFT-SIDED SCIATICA: ICD-10-CM

## 2019-08-07 DIAGNOSIS — E11.42 TYPE 2 DIABETES MELLITUS WITH DIABETIC POLYNEUROPATHY, WITH LONG-TERM CURRENT USE OF INSULIN (HCC): Primary | ICD-10-CM

## 2019-08-07 DIAGNOSIS — E78.5 HYPERLIPIDEMIA WITH TARGET LDL LESS THAN 70: ICD-10-CM

## 2019-08-07 DIAGNOSIS — R10.30 LOWER ABDOMINAL PAIN: ICD-10-CM

## 2019-08-07 DIAGNOSIS — N18.30 CKD (CHRONIC KIDNEY DISEASE) STAGE 3, GFR 30-59 ML/MIN (HCC): ICD-10-CM

## 2019-08-07 DIAGNOSIS — L08.9 RECURRENT INFECTION OF SKIN: ICD-10-CM

## 2019-08-07 LAB — HBA1C MFR BLD: 6.1 %

## 2019-08-07 PROCEDURE — G8598 ASA/ANTIPLAT THER USED: HCPCS | Performed by: FAMILY MEDICINE

## 2019-08-07 PROCEDURE — 3044F HG A1C LEVEL LT 7.0%: CPT | Performed by: FAMILY MEDICINE

## 2019-08-07 PROCEDURE — 83036 HEMOGLOBIN GLYCOSYLATED A1C: CPT | Performed by: FAMILY MEDICINE

## 2019-08-07 PROCEDURE — G8417 CALC BMI ABV UP PARAM F/U: HCPCS | Performed by: FAMILY MEDICINE

## 2019-08-07 PROCEDURE — 3017F COLORECTAL CA SCREEN DOC REV: CPT | Performed by: FAMILY MEDICINE

## 2019-08-07 PROCEDURE — 99215 OFFICE O/P EST HI 40 MIN: CPT | Performed by: FAMILY MEDICINE

## 2019-08-07 PROCEDURE — 1036F TOBACCO NON-USER: CPT | Performed by: FAMILY MEDICINE

## 2019-08-07 PROCEDURE — G8427 DOCREV CUR MEDS BY ELIG CLIN: HCPCS | Performed by: FAMILY MEDICINE

## 2019-08-07 PROCEDURE — 2022F DILAT RTA XM EVC RTNOPTHY: CPT | Performed by: FAMILY MEDICINE

## 2019-08-07 RX ORDER — LISINOPRIL 5 MG/1
5 TABLET ORAL DAILY
Qty: 90 TABLET | Refills: 3 | Status: SHIPPED | OUTPATIENT
Start: 2019-08-07 | End: 2019-10-10 | Stop reason: SDUPTHER

## 2019-08-07 RX ORDER — CLOPIDOGREL BISULFATE 75 MG/1
75 TABLET ORAL DAILY
Qty: 90 TABLET | Refills: 3 | Status: SHIPPED | OUTPATIENT
Start: 2019-08-07 | End: 2020-07-29

## 2019-08-07 ASSESSMENT — ENCOUNTER SYMPTOMS
BACK PAIN: 1
NAUSEA: 0
VOMITING: 0
ABDOMINAL PAIN: 1
DIARRHEA: 0
COUGH: 0
WHEEZING: 0
CONSTIPATION: 0
ABDOMINAL DISTENTION: 0
APNEA: 1
SHORTNESS OF BREATH: 1
CHEST TIGHTNESS: 0

## 2019-08-07 NOTE — PATIENT INSTRUCTIONS
Timi Ackerman MD Consulting Physician Urology 08/07/2019 End  8/7/19   Phone: [phone]; Fax: [phone]          Patient Education        Learning About Diabetes Food Guidelines  Your Care Instructions    Meal planning is important to manage diabetes. It helps keep your blood sugar at a target level (which you set with your doctor). You don't have to eat special foods. You can eat what your family eats, including sweets once in a while. But you do have to pay attention to how often you eat and how much you eat of certain foods. You may want to work with a dietitian or a certified diabetes educator (CDE) to help you plan meals and snacks. A dietitian or CDE can also help you lose weight if that is one of your goals. What should you know about eating carbs? Managing the amount of carbohydrate (carbs) you eat is an important part of healthy meals when you have diabetes. Carbohydrate is found in many foods. · Learn which foods have carbs. And learn the amounts of carbs in different foods. ? Bread, cereal, pasta, and rice have about 15 grams of carbs in a serving. A serving is 1 slice of bread (1 ounce), ½ cup of cooked cereal, or 1/3 cup of cooked pasta or rice. ? Fruits have 15 grams of carbs in a serving. A serving is 1 small fresh fruit, such as an apple or orange; ½ of a banana; ½ cup of cooked or canned fruit; ½ cup of fruit juice; 1 cup of melon or raspberries; or 2 tablespoons of dried fruit. ? Milk and no-sugar-added yogurt have 15 grams of carbs in a serving. A serving is 1 cup of milk or 2/3 cup of no-sugar-added yogurt. ? Starchy vegetables have 15 grams of carbs in a serving. A serving is ½ cup of mashed potatoes or sweet potato; 1 cup winter squash; ½ of a small baked potato; ½ cup of cooked beans; or ½ cup cooked corn or green peas. · Learn how much carbs to eat each day and at each meal. A dietitian or CDE can teach you how to keep track of the amount of carbs you eat.  This is called carbohydrate counting. · If you are not sure how to count carbohydrate grams, use the Plate Method to plan meals. It is a good, quick way to make sure that you have a balanced meal. It also helps you spread carbs throughout the day. ? Divide your plate by types of foods. Put non-starchy vegetables on half the plate, meat or other protein food on one-quarter of the plate, and a grain or starchy vegetable in the final quarter of the plate. To this you can add a small piece of fruit and 1 cup of milk or yogurt, depending on how many carbs you are supposed to eat at a meal.  · Try to eat about the same amount of carbs at each meal. Do not \"save up\" your daily allowance of carbs to eat at one meal.  · Proteins have very little or no carbs per serving. Examples of proteins are beef, chicken, turkey, fish, eggs, tofu, cheese, cottage cheese, and peanut butter. A serving size of meat is 3 ounces, which is about the size of a deck of cards. Examples of meat substitute serving sizes (equal to 1 ounce of meat) are 1/4 cup of cottage cheese, 1 egg, 1 tablespoon of peanut butter, and ½ cup of tofu. How can you eat out and still eat healthy? · Learn to estimate the serving sizes of foods that have carbohydrate. If you measure food at home, it will be easier to estimate the amount in a serving of restaurant food. · If the meal you order has too much carbohydrate (such as potatoes, corn, or baked beans), ask to have a low-carbohydrate food instead. Ask for a salad or green vegetables. · If you use insulin, check your blood sugar before and after eating out to help you plan how much to eat in the future. · If you eat more carbohydrate at a meal than you had planned, take a walk or do other exercise. This will help lower your blood sugar. What else should you know? · Limit saturated fat, such as the fat from meat and dairy products.  This is a healthy choice because people who have diabetes are at higher risk of heart

## 2019-08-07 NOTE — PROGRESS NOTES
records: trend: stable, low 100's, max 140-160, but rarely    Any episodes of hypoglycemia? no    Is He on ACE inhibitor or angiotensin II receptor blocker? Yes      reports that he quit smoking about 3 months ago. His smoking use included cigarettes. He started smoking about 34 years ago. He has a 66.00 pack-year smoking history. He quit smokeless tobacco use about 24 years ago. His smokeless tobacco use included snuff. Counseling given: Yes      Daily Aspirin? Yes      A1c improved  Lab Results   Component Value Date    LABA1C 6.1 08/07/2019    LABA1C 7.2 05/02/2019    LABA1C 8.2 (H) 04/15/2019       Urine microabumin is within normal limits     Lab Results   Component Value Date    LABMICR 7 12/11/2018      LDL controlled      Lab Results   Component Value Date    LDLCHOLESTEROL 71 09/20/2018    LDLDIRECT 72 03/13/2013           Hypertension, congestive heart failure and coronary artery disease : Patient here for follow-up    He is  Exercising, swimming,  and is adherent to low salt diet. Blood pressure is well controlled at home. Cardiac symptoms  dyspnea, fatigue and orthopnea. Patient denies  chest pain, chest pressure/discomfort, claudication, exertional chest pressure/discomfort, irregular heart beat, lower extremity edema, near-syncope, palpitations, paroxysmal nocturnal dyspnea, syncope and tachypnea. Use of agents associated with hypertension: none. History of target organ damage: angina/ prior myocardial infarction, chronic kidney disease, heart failure and prior coronary revascularization. Has 1 stent. He goes to cardiology and CHF clinic. BP controlled. Miguel reports compliance with BP medications, and tolerates them well, denies side effects. BP Readings from Last 3 Encounters:   08/07/19 138/64   07/31/19 138/60   07/25/19 104/70        Pulse controlled.     Pulse Readings from Last 3 Encounters:   08/07/19 77   07/25/19 67   07/01/19 74     Lab Results   Component Value Date    LVEF 53 03/29/2018    LVEFMODE Echo 08/29/2017             Hyperlipidemia:  No new myalgias or GI upset on pravastatin (Pravachol). Medication compliance: compliant all of the time. Patient is  following a low fat, low cholesterol diet. He has chronic back pain, located in the lower back, midline, and radiating to the left side  Intensity of the pain is today 6-7/10, pain is worse with activities and at nighttime. He has history of back surgery and failed postlaminectomy. He has been on pain medications for a long time. He denies side effects. He has chronic kidney disease he cannot take NSAIDs. Has been on gabapentin and oxycodone which make his pain more tolerable and able to do his ADLs      Depression  Says he is still depressed, same as before. He declines counseling  He says he has has multiple reasons to be depressed. Cannot work, cannot have sex for the past 5 yrs, and has multiple medical issues. Patient complains of depression and the most important trigger being erectile dysfunction and not having sex with his wife for the past 5 years. He was evaluated by urology who suggested no intervention for bilateral  Hydrocele, as it might come back and the surgery will not be indicated for him. They also suggested injections for erectile dysfunction and no medications, which I do agree. It seems that mostly he is affected by no sexual life. I advised him that he needs to follow up with urology again. Doesn't want more pills! He is already on Venlaflaxine. Denies suicidal ideation, plan or intent. Moderate depression   PHQ Scores 6/19/2019 3/15/2019 2/22/2019 10/23/2018 9/18/2018 8/18/2017 6/23/2017   PHQ2 Score 4 6 2 6 3 2 5   PHQ9 Score 14 17 11 11 6 14 21     Has history of recurrent skin infections affecting the skin folds, and requiring I&D, antibiotics and admission.   Denies infections in the past 1-2 weeks  Says he has been swimming with his grandbabies in his yard pool, in topically 2 times daily       fluticasone (FLONASE) 50 MCG/ACT nasal spray 2 sprays by Nasal route daily (Patient taking differently: 2 sprays by Nasal route daily as needed (sinus symptoms) ) 1 Bottle 3    Melatonin 10 MG TABS Take 10 mg by mouth nightly as needed (insomnia) 90 tablet 1    COMFORT ASSIST INSULIN SYRINGE 31G X \" 1 ML MISC       aspirin 81 MG EC tablet Take 81 mg by mouth daily.  Spacer/Aero Chamber Mouthpiece MISC 1 each by Does not apply route once as needed (to be used with his inhalers) 1 each 0     No current facility-administered medications for this visit. Allergies   Allergen Reactions    Levofloxacin Anaphylaxis     Patient reports needing epinephrine \"about 5 or 6 months ago\" for anaphylaxis (itching, hives, SOB/swelling) after receiving Levofloxacin. Previous report from 2012: Nausea/Vomiting    Lorazepam      Falls      Nsaids      CHF&CKD    Prozac [Fluoxetine Hcl] Other (See Comments)     Pt started with seizures after started taking.  Vancomycin Other (See Comments)     Itching, SOB, emesis upon infusion in ED 2019. Patient states he has had vancomycin \"a number of times\" before without issue. couldn't breath and talk, throat closed        Rest of complaints with corresponding details per ROS      The patient'spast medical, surgical, social, and family history as well as his current medications and allergies were reviewed as documented in today's encounter. Social History     Tobacco Use    Smoking status: Former Smoker     Packs/day: 2.00     Years: 33.00     Pack years: 66.00     Types: Cigarettes     Start date: 1985     Last attempt to quit: 2019     Years since quittin.3    Smokeless tobacco: Former User     Types: Snuff     Quit date: 1995   Substance Use Topics    Alcohol use: No     Alcohol/week: 0.0 standard drinks    Drug use: Yes     Types: Marijuana       Counseling given:  Yes                Review of HENT:   Head: Normocephalic and atraumatic. Right Ear: There is swelling. Decreased hearing is noted. Left Ear: There is swelling. Decreased hearing is noted. Nose: Nose normal.   Mouth/Throat: Oropharynx is clear and moist. No oropharyngeal exudate. Mallampati  4. Very dry scaly skin bilateral ear canals and swollen external ears bilateral    Eyes: Conjunctivae and EOM are normal. Right eye exhibits no discharge. Left eye exhibits no discharge. No scleral icterus. Neck: Normal range of motion. Neck supple. No JVD present. No thyromegaly present. Cardiovascular: Normal rate, regular rhythm, normal heart sounds and intact distal pulses. Pulmonary/Chest: Effort normal. No respiratory distress. He has decreased breath sounds in the right lower field and the left lower field. He has no wheezes. He has no rales. He exhibits no tenderness. He has been breathing through pursed lips, same as before   Abdominal: Soft. Bowel sounds are normal. He exhibits no distension. There is no hepatosplenomegaly. There is tenderness in the right lower quadrant, suprapubic area and left lower quadrant. There is no rigidity, no rebound and no guarding. Very Obese abdomen. Musculoskeletal: He exhibits tenderness. He exhibits no edema. Lumbar back: He exhibits decreased range of motion, tenderness, bony tenderness, pain and spasm. Ambulates with cane  Old vertical lumbar surgical scar. Neurological: He is alert and oriented to person, place, and time. No cranial nerve deficit. He exhibits normal muscle tone. Coordination normal.   Skin: Skin is warm and dry. Capillary refill takes less than 2 seconds. Rash noted. He is not diaphoretic. Acanthosis nigricans on the neck, abdominal wall, and axillary  Bilateral stasis dermatitis, with brownish-reddish rash and  dry skin on the legs.    Abdominal skin fold with mild intertrigo, redness noted   Psychiatric: His behavior is normal. Judgment and thought content Date    WBC 11.3 (H) 06/14/2019    HGB 12.5 (L) 06/14/2019    HCT 38.0 (L) 06/14/2019    MCV 85.6 06/14/2019     06/14/2019       Lab Results   Component Value Date     07/25/2019    K 4.1 07/25/2019     07/25/2019    CO2 24 07/25/2019    BUN 40 07/25/2019    CREATININE 1.83 07/25/2019    GLUCOSE 153 06/21/2019    CALCIUM 9.2 06/21/2019        Lab Results   Component Value Date    ALT 25 05/09/2019    AST 19 05/09/2019    ALKPHOS 61 05/09/2019    BILITOT 0.35 05/09/2019       Lab Results   Component Value Date    TSH 1.97 02/22/2019       Lab Results   Component Value Date    CHOL 161 09/20/2018    CHOL 172 08/16/2017    CHOL 134 12/26/2016     Lab Results   Component Value Date    TRIG 312 (H) 09/20/2018    TRIG 278 (H) 08/16/2017    TRIG 199 (H) 12/26/2016     Lab Results   Component Value Date    HDL 28 (L) 09/20/2018    HDL 27 (L) 08/16/2017    HDL 28 (L) 12/26/2016     Lab Results   Component Value Date    LDLCHOLESTEROL 71 09/20/2018    LDLCHOLESTEROL 89 08/16/2017    LDLCHOLESTEROL 66 12/26/2016     Lab Results   Component Value Date    CHOLHDLRATIO 5.8 (H) 09/20/2018    CHOLHDLRATIO 6.4 (H) 08/16/2017    CHOLHDLRATIO 4.8 12/26/2016         Lab Results   Component Value Date    IPQGWJLH80 379 02/22/2019     Lab Results   Component Value Date    FOLATE 18.6 02/22/2019     Lab Results   Component Value Date    VITD25 41.2 02/22/2019       PFTs--moderate to severe obstruction and likely associated asthma  He was declined for pulmonary rehab as not severe enough    \"IMPRESSION:  In summary, there is evidence of a moderate to severe  obstructive defect with reversibility. This could be COPD with an  element of reversibility. Clinical correlation recommended. \"       ASSESSMENT AND PLAN  1.  Type 2 diabetes mellitus with diabetic polyneuropathy, with long-term current use of insulin ([facility])  Improving   - POCT glycosylated hemoglobin (Hb A1C) 6.1  Lab Results   Component Value Date    LABA1C 6.1

## 2019-08-11 VITALS
SYSTOLIC BLOOD PRESSURE: 138 MMHG | DIASTOLIC BLOOD PRESSURE: 64 MMHG | OXYGEN SATURATION: 97 % | WEIGHT: 315 LBS | BODY MASS INDEX: 42.66 KG/M2 | HEART RATE: 77 BPM | HEIGHT: 72 IN

## 2019-08-11 PROBLEM — L08.9 RECURRENT INFECTION OF SKIN: Status: ACTIVE | Noted: 2019-08-11

## 2019-08-11 PROBLEM — R60.0 BILATERAL LEG EDEMA: Status: RESOLVED | Noted: 2017-05-25 | Resolved: 2019-08-11

## 2019-08-12 ENCOUNTER — TELEPHONE (OUTPATIENT)
Dept: FAMILY MEDICINE CLINIC | Age: 54
End: 2019-08-12

## 2019-08-12 NOTE — TELEPHONE ENCOUNTER
Noted, yes he did complain of blurry vision    If the blurry vision got worse, from one day to the other, then needs to go to ED

## 2019-08-15 ENCOUNTER — HOSPITAL ENCOUNTER (OUTPATIENT)
Dept: PHARMACY | Age: 54
Setting detail: THERAPIES SERIES
Discharge: HOME | End: 2019-08-15
Payer: COMMERCIAL

## 2019-08-15 ENCOUNTER — HOSPITAL ENCOUNTER (OUTPATIENT)
Dept: OTHER | Age: 54
Discharge: HOME | End: 2019-08-15
Payer: COMMERCIAL

## 2019-08-15 VITALS
DIASTOLIC BLOOD PRESSURE: 80 MMHG | WEIGHT: 315 LBS | SYSTOLIC BLOOD PRESSURE: 142 MMHG | RESPIRATION RATE: 24 BRPM | BODY MASS INDEX: 54.93 KG/M2 | HEART RATE: 78 BPM | OXYGEN SATURATION: 98 %

## 2019-08-15 PROCEDURE — 99211 OFF/OP EST MAY X REQ PHY/QHP: CPT

## 2019-08-15 PROCEDURE — 99212 OFFICE O/P EST SF 10 MIN: CPT

## 2019-08-15 NOTE — PROGRESS NOTES
End Date Taking? Authorizing Provider   clopidogrel (PLAVIX) 75 MG tablet Take 1 tablet by mouth daily 8/7/19   Felicita Barajas MD   lisinopril (PRINIVIL;ZESTRIL) 5 MG tablet Take 1 tablet by mouth daily . Discontinued Lisinopril  10 mg 8/7/19   Felicita Barajas MD   Misc. Devices (HIBICLENS HAND PUMP 32OZ) MISC For skin decontamination daily use, rinse well after the using it 8/7/19   Felicita Barajas MD   mupirocin (BACTROBAN) 2 % ointment Apply topically 2 times a day in each nostril x 7 days 8/7/19   Felicita Barajas MD   ONE TOUCH ULTRASOFT LANCETS MISC USE ONE LANCET TO TEST THREE TIMES A DAY 7/29/19   Felicita Barajas MD   oxyCODONE HCl (OXY-IR) 10 MG immediate release tablet Take 1 tablet by mouth every 8 hours as needed for Pain for up to 30 days. 7/29/19 8/28/19  Felicita Barajas MD   Lactobacillus (PROBIOTIC ACIDOPHILUS) TABS Take 1 tablet by mouth 2 times daily 7/22/19 8/21/19  Felicita Barajas MD   venlafaxine (EFFEXOR XR) 75 MG extended release capsule Take 1 capsule by mouth daily 6/27/19   Leron Leventhal, MD   nystatin (MYCOSTATIN) 936283 UNIT/GM powder Apply 2-3 times daily in skin folds. 6/19/19   Felicita Barajas MD   tiotropium (SPIRIVA RESPIMAT) 2.5 MCG/ACT AERS inhaler Inhale 2 puffs into the lungs daily 6/4/19   Felicita Barajas MD   albuterol (PROVENTIL) (2.5 MG/3ML) 0.083% nebulizer solution Take 3 mLs by nebulization every 6 hours as needed for Wheezing or Shortness of Breath 6/4/19   Felicita Barajas MD   Lancet Devices (LANCING DEVICE) MISC Provide patient with lancing device appropriate for his machine/lancing needles.  6/4/19   Felicita Barajas MD   pravastatin (PRAVACHOL) 40 MG tablet Take 1 tablet by mouth nightly 5/22/19   Felicita Barajas MD   Lidocaine 4 % LOTN Apply topically    Historical Provider, MD   Spacer/Aero Chamber Mouthpiece MISC 1 each by Does not apply route once as needed (to be used with his inhalers) 4/15/19 6/4/19  Paige 8:04 AM

## 2019-08-23 ENCOUNTER — OFFICE VISIT (OUTPATIENT)
Dept: PULMONOLOGY | Age: 54
End: 2019-08-23
Payer: COMMERCIAL

## 2019-08-23 VITALS
WEIGHT: 315 LBS | HEART RATE: 75 BPM | SYSTOLIC BLOOD PRESSURE: 124 MMHG | HEIGHT: 72 IN | BODY MASS INDEX: 42.66 KG/M2 | DIASTOLIC BLOOD PRESSURE: 65 MMHG | OXYGEN SATURATION: 94 %

## 2019-08-23 DIAGNOSIS — G47.33 OSA TREATED WITH BIPAP: ICD-10-CM

## 2019-08-23 DIAGNOSIS — J96.10 CHRONIC RESPIRATORY FAILURE, UNSPECIFIED WHETHER WITH HYPOXIA OR HYPERCAPNIA (HCC): ICD-10-CM

## 2019-08-23 DIAGNOSIS — E66.01 CLASS 2 SEVERE OBESITY DUE TO EXCESS CALORIES WITH SERIOUS COMORBIDITY IN ADULT, UNSPECIFIED BMI (HCC): ICD-10-CM

## 2019-08-23 DIAGNOSIS — J42 CHRONIC BRONCHITIS, UNSPECIFIED CHRONIC BRONCHITIS TYPE (HCC): Primary | ICD-10-CM

## 2019-08-23 DIAGNOSIS — E11.21 CONTROLLED TYPE 2 DIABETES MELLITUS WITH DIABETIC NEPHROPATHY, WITHOUT LONG-TERM CURRENT USE OF INSULIN (HCC): ICD-10-CM

## 2019-08-23 DIAGNOSIS — G47.33 OSA ON CPAP: ICD-10-CM

## 2019-08-23 DIAGNOSIS — Z99.89 OSA ON CPAP: ICD-10-CM

## 2019-08-23 DIAGNOSIS — G47.10 HYPERSOMNIA: ICD-10-CM

## 2019-08-23 PROCEDURE — 99214 OFFICE O/P EST MOD 30 MIN: CPT | Performed by: INTERNAL MEDICINE

## 2019-08-23 PROCEDURE — G8926 SPIRO NO PERF OR DOC: HCPCS | Performed by: INTERNAL MEDICINE

## 2019-08-23 PROCEDURE — 3023F SPIROM DOC REV: CPT | Performed by: INTERNAL MEDICINE

## 2019-08-23 PROCEDURE — 3017F COLORECTAL CA SCREEN DOC REV: CPT | Performed by: INTERNAL MEDICINE

## 2019-08-23 PROCEDURE — 1036F TOBACCO NON-USER: CPT | Performed by: INTERNAL MEDICINE

## 2019-08-23 PROCEDURE — G8427 DOCREV CUR MEDS BY ELIG CLIN: HCPCS | Performed by: INTERNAL MEDICINE

## 2019-08-23 PROCEDURE — G8598 ASA/ANTIPLAT THER USED: HCPCS | Performed by: INTERNAL MEDICINE

## 2019-08-23 PROCEDURE — G8417 CALC BMI ABV UP PARAM F/U: HCPCS | Performed by: INTERNAL MEDICINE

## 2019-08-23 PROCEDURE — 3044F HG A1C LEVEL LT 7.0%: CPT | Performed by: INTERNAL MEDICINE

## 2019-08-23 PROCEDURE — 2022F DILAT RTA XM EVC RTNOPTHY: CPT | Performed by: INTERNAL MEDICINE

## 2019-08-23 ASSESSMENT — SLEEP AND FATIGUE QUESTIONNAIRES
HOW LIKELY ARE YOU TO NOD OFF OR FALL ASLEEP WHILE SITTING INACTIVE IN A PUBLIC PLACE: 0
HOW LIKELY ARE YOU TO NOD OFF OR FALL ASLEEP WHEN YOU ARE A PASSENGER IN A CAR FOR AN HOUR WITHOUT A BREAK: 1
HOW LIKELY ARE YOU TO NOD OFF OR FALL ASLEEP WHILE SITTING QUIETLY AFTER LUNCH WITHOUT ALCOHOL: 0
HOW LIKELY ARE YOU TO NOD OFF OR FALL ASLEEP WHILE LYING DOWN TO REST IN THE AFTERNOON WHEN CIRCUMSTANCES PERMIT: 3
HOW LIKELY ARE YOU TO NOD OFF OR FALL ASLEEP WHILE SITTING AND TALKING TO SOMEONE: 0
HOW LIKELY ARE YOU TO NOD OFF OR FALL ASLEEP WHILE WATCHING TV: 0
HOW LIKELY ARE YOU TO NOD OFF OR FALL ASLEEP IN A CAR, WHILE STOPPED FOR A FEW MINUTES IN TRAFFIC: 0
ESS TOTAL SCORE: 4
HOW LIKELY ARE YOU TO NOD OFF OR FALL ASLEEP WHILE SITTING AND READING: 0

## 2019-08-26 NOTE — PROGRESS NOTES
CATHETERIZATION  2018    no stenting    COLONOSCOPY  11/3/2015    hemorrhoids, poor prep    CORONARY ANGIOPLASTY WITH STENT PLACEMENT  March 2013    x 1    HAND TENDON SURGERY Left     thumb tendon repair    KNEE ARTHROSCOPY Left     NERVE BLOCK  07-31-15    TENS unit    ND ESOPHAGOGASTRODUODENOSCOPY TRANSORAL DIAGNOSTIC N/A 2018    EGD ESOPHAGOGASTRODUODENOSCOPY performed by Melanie Monreal MD at [facility] Bilateral 2012    Dr Quin Bonilla  2013    normal     Social History     Tobacco Use   Smoking Status Former Smoker    Packs/day: 2.00    Years: 33.00    Pack years: 66.00    Types: Cigarettes    Start date: 1985    Last attempt to quit: 2019    Years since quittin.3   Smokeless Tobacco Former User    Types: Snuff    Quit date: 1995         PHYSICAL EXAM:   General Appearance:    Alert, cooperative, no distress, appears stated age, Morbid obesity     Head:    Normocephalic, without obvious abnormality, atraumatic No Alex's syndrome.   Short fat neck     Neck:   Supple, symmetrical, trachea midline, no adenopathy;     thyroid:  no enlargement/tenderness/nodules; no carotid    bruit or JVD     Back:     Symmetric, no curvature, ROM normal, no CVA tenderness     Lungs:     Clear to auscultation bilaterally, respirations unlabored no     dullness no bb, no wheezing no rales, vent all lobes,     diaphram motion normal .  Prolonged expiration     Chest Wall:    No tenderness or deformity      Heart:    Regular rate and rhythm, S1 and S2 normal, no murmur, rub     or gallop no rvh, P2 normal                          Abdomen:                              Pulses:                                 Lymph nodes:                    Neurologic:                  Soft, non-tender, bowel sounds active all four quadrants,     no masses, no organomegaly     2+ and symmetric all extremities      Cervical,

## 2019-08-27 ENCOUNTER — PATIENT MESSAGE (OUTPATIENT)
Dept: FAMILY MEDICINE CLINIC | Age: 54
End: 2019-08-27

## 2019-08-27 ENCOUNTER — OFFICE VISIT (OUTPATIENT)
Dept: PULMONOLOGY | Age: 54
End: 2019-08-27
Payer: COMMERCIAL

## 2019-08-27 VITALS — HEART RATE: 95 BPM | OXYGEN SATURATION: 97 % | WEIGHT: 315 LBS | BODY MASS INDEX: 42.66 KG/M2 | HEIGHT: 72 IN

## 2019-08-27 DIAGNOSIS — N18.30 CKD (CHRONIC KIDNEY DISEASE) STAGE 3, GFR 30-59 ML/MIN (HCC): ICD-10-CM

## 2019-08-27 DIAGNOSIS — M54.9 CHRONIC BACK PAIN GREATER THAN 3 MONTHS DURATION: ICD-10-CM

## 2019-08-27 DIAGNOSIS — G89.29 CHRONIC MIDLINE LOW BACK PAIN WITH LEFT-SIDED SCIATICA: ICD-10-CM

## 2019-08-27 DIAGNOSIS — M96.1 POSTLAMINECTOMY SYNDROME OF LUMBAR REGION: ICD-10-CM

## 2019-08-27 DIAGNOSIS — G47.33 OSA TREATED WITH BIPAP: Primary | ICD-10-CM

## 2019-08-27 DIAGNOSIS — J44.9 MIXED TYPE COPD (CHRONIC OBSTRUCTIVE PULMONARY DISEASE) (HCC): ICD-10-CM

## 2019-08-27 DIAGNOSIS — M51.36 DDD (DEGENERATIVE DISC DISEASE), LUMBAR: ICD-10-CM

## 2019-08-27 DIAGNOSIS — M54.42 CHRONIC MIDLINE LOW BACK PAIN WITH LEFT-SIDED SCIATICA: ICD-10-CM

## 2019-08-27 DIAGNOSIS — G89.29 CHRONIC BACK PAIN GREATER THAN 3 MONTHS DURATION: ICD-10-CM

## 2019-08-27 PROCEDURE — 94618 PULMONARY STRESS TESTING: CPT | Performed by: INTERNAL MEDICINE

## 2019-08-27 RX ORDER — OXYCODONE HYDROCHLORIDE 10 MG/1
10 TABLET ORAL EVERY 8 HOURS PRN
Qty: 90 TABLET | Refills: 0 | Status: SHIPPED | OUTPATIENT
Start: 2019-08-27 | End: 2019-09-26

## 2019-08-30 ENCOUNTER — OFFICE VISIT (OUTPATIENT)
Dept: NEUROLOGY | Age: 54
End: 2019-08-30
Payer: COMMERCIAL

## 2019-08-30 VITALS
HEART RATE: 72 BPM | BODY MASS INDEX: 42.66 KG/M2 | HEIGHT: 72 IN | SYSTOLIC BLOOD PRESSURE: 83 MMHG | WEIGHT: 315 LBS | DIASTOLIC BLOOD PRESSURE: 38 MMHG

## 2019-08-30 DIAGNOSIS — H93.11 TINNITUS OF RIGHT EAR: ICD-10-CM

## 2019-08-30 DIAGNOSIS — R20.2 PARESTHESIAS: ICD-10-CM

## 2019-08-30 DIAGNOSIS — R56.9 SEIZURES (HCC): Primary | ICD-10-CM

## 2019-08-30 PROCEDURE — 99204 OFFICE O/P NEW MOD 45 MIN: CPT | Performed by: PSYCHIATRY & NEUROLOGY

## 2019-08-30 PROCEDURE — G8598 ASA/ANTIPLAT THER USED: HCPCS | Performed by: PSYCHIATRY & NEUROLOGY

## 2019-08-30 PROCEDURE — G8417 CALC BMI ABV UP PARAM F/U: HCPCS | Performed by: PSYCHIATRY & NEUROLOGY

## 2019-08-30 PROCEDURE — 1036F TOBACCO NON-USER: CPT | Performed by: PSYCHIATRY & NEUROLOGY

## 2019-08-30 PROCEDURE — 3017F COLORECTAL CA SCREEN DOC REV: CPT | Performed by: PSYCHIATRY & NEUROLOGY

## 2019-08-30 PROCEDURE — G8427 DOCREV CUR MEDS BY ELIG CLIN: HCPCS | Performed by: PSYCHIATRY & NEUROLOGY

## 2019-08-30 ASSESSMENT — ENCOUNTER SYMPTOMS
COUGH: 1
DIARRHEA: 1
SHORTNESS OF BREATH: 1
BACK PAIN: 1

## 2019-08-30 NOTE — PROGRESS NOTES
Does not apply route Can't walk greater than 200 feet. Expires in 5 years. 1 each 0    tiZANidine (ZANAFLEX) 4 MG tablet TAKE ONE TABLET BY MOUTH EVERY 8 HOURS AS NEEDED FOR BACK PAIN 90 tablet 5    pantoprazole (PROTONIX) 40 MG tablet Take 1 tablet by mouth nightly 90 tablet 3    miconazole (MICOTIN) 2 % powder Apply topically 2 times daily. 45 g 1    Ferrous Sulfate (IRON) 325 (65 Fe) MG TABS Take 1 tablet by mouth daily 90 tablet 3    metoprolol tartrate (LOPRESSOR) 50 MG tablet Take 1 tablet by mouth 2 times daily 180 tablet 3    bumetanide (BUMEX) 1 MG tablet Take 3 tablets by mouth 2 times daily 180 tablet 1    fluticasone-salmeterol (ADVAIR HFA) 230-21 MCG/ACT inhaler Inhale 2 puffs into the lungs 2 times daily 12 g 5    insulin regular human (HUMULIN R) 500 UNIT/ML concentrated injection vial Inject into the skin 3 times daily Indications: 50 in the morning 50 at lunch and 40 at dinner. U-500 insulin--Pt. Takes 50 u at breakfast and lunch and  40 u at supper time.  nitroGLYCERIN (NITROSTAT) 0.4 MG SL tablet Place 1 tablet under the tongue every 5 minutes as needed for Chest pain 25 tablet 3    fluticasone (CUTIVATE) 0.05 % cream Apply topically 2 times daily       fluticasone (FLONASE) 50 MCG/ACT nasal spray 2 sprays by Nasal route daily (Patient taking differently: 2 sprays by Nasal route daily as needed (sinus symptoms) ) 1 Bottle 3    Melatonin 10 MG TABS Take 10 mg by mouth nightly as needed (insomnia) 90 tablet 1    COMFORT ASSIST INSULIN SYRINGE 31G X 5/16\" 1 ML MISC       aspirin 81 MG EC tablet Take 81 mg by mouth daily. No current facility-administered medications for this visit. Allergies   Allergen Reactions    Levofloxacin Anaphylaxis     Patient reports needing epinephrine \"about 5 or 6 months ago\" for anaphylaxis (itching, hives, SOB/swelling) after receiving Levofloxacin.   Previous report from 08/20/2012: Nausea/Vomiting    Lorazepam      Falls      Nsaids CHF&CKD    Prozac [Fluoxetine Hcl] Other (See Comments)     Pt started with seizures after started taking.  Vancomycin Other (See Comments)     Itching, SOB, emesis upon infusion in ED 6/12/2019. Patient states he has had vancomycin \"a number of times\" before without issue. couldn't breath and talk, throat closed       Review of Systems   Constitutional: Positive for unexpected weight change. HENT: Positive for ear pain, hearing loss and tinnitus. Eyes: Positive for visual disturbance. Respiratory: Positive for cough and shortness of breath. Cardiovascular: Positive for chest pain and leg swelling. Gastrointestinal: Positive for diarrhea. Endocrine: Negative. Genitourinary: Positive for urgency. Musculoskeletal: Positive for arthralgias, back pain, gait problem and myalgias. Skin: Negative. Neurological: Positive for dizziness, weakness and numbness. Hematological: Negative. Psychiatric/Behavioral: Positive for dysphoric mood. The patient is nervous/anxious. Objective:   Physical Exam  Vitals:    08/30/19 0952   BP: (!) 83/38   Pulse: 72     weight: (!) 417 lb 3.2 oz (189.2 kg)    Neurological Examination  Constitutional .General exam well groomed   Head/Ears /Nose/Throat: external ear . Normal exam  Neck and thyroid . Normal size. No bruits  Respiratory . Breathsounds clear bilaterally  Cardiovascular:  Auscultation of heart with regular rate and rhythm  Musculoskeletal. Muscle bulk and tone normal                                                           Muscle strength 5/5 strength throughout                                                                                No dysmetria or dysdiadokinesis  No tremor   Normal fine motor  Gait normal   Orientation Alert and oriented x 3   Attention and concentration normal  Short term memory normal  Language process and speech normal . No aphasia   Cranial nerve 2 Able to detect only light right eye  acuety and visual fields  Cranial nerve 3, 4 and 6 . Extraocular muscles are intact . Pupils are equal and reactive   Cranial nerve 5 . Normal strength of masseter and temporalis . Intact corneal reflex. Normal facial sensation  Cranial nerve 7 normal exam   Cranial nerve 8. Bilateral hearing loss  Cranial nerve 9 and 10. Symmetric palate elevation   Cranial nerve 11 , 5 out of 5 strength   Cranial Nerve 12 midline tongue . No atrophy  Sensation . Decreased pinprick and light touch distal lower extremities   Deep Tendon Reflexes hypoactive with absent ankle jerks   Plantar response flexor bilaterally    Assessment:       Diagnosis Orders   1. Seizures ([address]  MRA Head WO Contrast    MRI Brain WO Contrast   2. Paresthesias  MRA Head WO Contrast    MRI Brain WO Contrast   3. Tinnitus of right ear  MRA Head WO Contrast    MRI Brain WO Contrast   Patient had provoked seizures attributed to prozac none since this medication was stopped .  He is having right scalp paresthesias which are benign along with tinnitus attributed to sensorineural hearing loss       Plan:      Orders Placed This Encounter   Procedures    MRA Head WO Contrast     Standing Status:   Future     Standing Expiration Date:   8/29/2020    MRI Brain WO Contrast     Standing Status:   Future     Standing Expiration Date:   8/29/2020           Pascale Pereira MD

## 2019-08-30 NOTE — COMMUNICATION BODY
Subjective:      Patient ID: Hilaria Pinzon. is a 46 y.o. male. HPI  47 yo wm with tinnitus and scalp numbness prior seizures . He reports that 2 months ago on adding prozac to chantixx he began having seizures the same day . He  reports that he was having 2 to 3 seizures per day described as unresponsivenes with generalized jerking lasting about one minte . He reports that he had been on chantixx for months to stop smoking with thereon addition of prozac for depression . He reports that the same day that prozac was added he began having seizures . He took prozac for 1 1/2 weeks having daily seizures during this period averaging 2 to 3 per day . He was taken off prozac and 3 days later seizures stopped having no seizures since then . Chantixx was stopped 1 month ago . Last seizure was 2 months ago . EEG done in July was normal  . He has never had seizures before . He does not have headaches . There has been right scalp buzzing sensation like novocaine is wearing off for the past 2 months which will be intermittent daily lasting 3 to 5 minutes several times per day . There will be intermittent ringing in right ear also daily for 2 months . There is bilateral hearing loss right ear more than left . He ruptured right ear drum as child getting tubes having left ear drum rebuilt after infection . There is no weakness , numbness or  other bulbar complaints . He does have bilateral cataracts to have upcoming surgery . He has sleep apnea on nasal CPAP machine  . For depression he is presently on effexor . Testing carotid US nonstenotic plaque formation , May 2017 . Cardiac 2 D echo normal LVF , EF 55-60% . Enlarged left atrium . Mild MR and TR , August 2017 . Head CT nomral brain , June 2017 .  EEG normal , July 2019      Past Medical History:   Diagnosis Date    Acute bronchitis with chronic obstructive pulmonary disease (COPD) [facility] [address]     Acute on chronic kidney failure ([address] 7/20/2017    Acute on chronic respiratory daily 90 tablet 3    lisinopril (PRINIVIL;ZESTRIL) 5 MG tablet Take 1 tablet by mouth daily . Discontinued Lisinopril  10 mg 90 tablet 3    Misc. Devices (HIBICLENS HAND PUMP 32OZ) MISC For skin decontamination daily use, rinse well after the using it 1 each 3    mupirocin (BACTROBAN) 2 % ointment Apply topically 2 times a day in each nostril x 7 days 30 g 2    ONE TOUCH ULTRASOFT LANCETS MISC USE ONE LANCET TO TEST THREE TIMES A  each 3    venlafaxine (EFFEXOR XR) 75 MG extended release capsule Take 1 capsule by mouth daily 30 capsule 3    nystatin (MYCOSTATIN) 119302 UNIT/GM powder Apply 2-3 times daily in skin folds. 60 g 5    tiotropium (SPIRIVA RESPIMAT) 2.5 MCG/ACT AERS inhaler Inhale 2 puffs into the lungs daily 4 g 3    albuterol (PROVENTIL) (2.5 MG/3ML) 0.083% nebulizer solution Take 3 mLs by nebulization every 6 hours as needed for Wheezing or Shortness of Breath 120 vial 0    Lancet Devices (LANCING DEVICE) MISC Provide patient with lancing device appropriate for his machine/lancing needles.  1 each 1    pravastatin (PRAVACHOL) 40 MG tablet Take 1 tablet by mouth nightly 90 tablet 3    Lidocaine 4 % LOTN Apply topically      Spacer/Aero Chamber Mouthpiece MISC 1 each by Does not apply route once as needed (to be used with his inhalers) 1 each 0    blood glucose test strips (ONE TOUCH ULTRA TEST) strip USE ONE STRIP TO TEST THREE TIMES A  strip 2    metolazone (ZAROXOLYN) 2.5 MG tablet Take 2.5 mg by mouth three times a week MWF      PROAIR  (90 Base) MCG/ACT inhaler INHALE TWO PUFFS BY MOUTH EVERY 6 HOURS AS NEEDED FOR WHEEZING OR SHORTNESS OF BREATH 1 Inhaler 4    spironolactone (ALDACTONE) 50 MG tablet Take 1 tablet by mouth daily 90 tablet 3    vitamin D (CHOLECALCIFEROL) 1000 UNIT TABS tablet Take 1 tablet by mouth daily 90 tablet 3    docusate sodium (STOOL SOFTENER) 100 MG capsule Take 1 capsule by mouth 2 times daily 180 capsule 3    Handicap Placard MISC by nerve 3, 4 and 6 . Extraocular muscles are intact . Pupils are equal and reactive   Cranial nerve 5 . Normal strength of masseter and temporalis . Intact corneal reflex. Normal facial sensation  Cranial nerve 7 normal exam   Cranial nerve 8. Bilateral hearing loss  Cranial nerve 9 and 10. Symmetric palate elevation   Cranial nerve 11 , 5 out of 5 strength   Cranial Nerve 12 midline tongue . No atrophy  Sensation . Decreased pinprick and light touch distal lower extremities   Deep Tendon Reflexes hypoactive with absent ankle jerks   Plantar response flexor bilaterally    Assessment:       Diagnosis Orders   1. Seizures ([address]  MRA Head WO Contrast    MRI Brain WO Contrast   2. Paresthesias  MRA Head WO Contrast    MRI Brain WO Contrast   3. Tinnitus of right ear  MRA Head WO Contrast    MRI Brain WO Contrast   Patient had provoked seizures attributed to prozac none since this medication was stopped .  He is having right scalp paresthesias which are benign along with tinnitus attributed to sensorineural hearing loss       Plan:      Orders Placed This Encounter   Procedures    MRA Head WO Contrast     Standing Status:   Future     Standing Expiration Date:   8/29/2020    MRI Brain WO Contrast     Standing Status:   Future     Standing Expiration Date:   8/29/2020           Kevin Saldana MD

## 2019-08-30 NOTE — LETTER
Fear of current or ex partner: None     Emotionally abused: None     Physically abused: None     Forced sexual activity: None   Other Topics Concern    None   Social History Narrative    Middle of possible separation 6/22/2016            Current Outpatient Medications   Medication Sig Dispense Refill    oxyCODONE HCl (OXY-IR) 10 MG immediate release tablet Take 1 tablet by mouth every 8 hours as needed for Pain for up to 30 days. 90 tablet 0    clopidogrel (PLAVIX) 75 MG tablet Take 1 tablet by mouth daily 90 tablet 3    lisinopril (PRINIVIL;ZESTRIL) 5 MG tablet Take 1 tablet by mouth daily . Discontinued Lisinopril  10 mg 90 tablet 3    Misc. Devices (HIBICLENS HAND PUMP 32OZ) MISC For skin decontamination daily use, rinse well after the using it 1 each 3    mupirocin (BACTROBAN) 2 % ointment Apply topically 2 times a day in each nostril x 7 days 30 g 2    ONE TOUCH ULTRASOFT LANCETS MISC USE ONE LANCET TO TEST THREE TIMES A  each 3    venlafaxine (EFFEXOR XR) 75 MG extended release capsule Take 1 capsule by mouth daily 30 capsule 3    nystatin (MYCOSTATIN) 038498 UNIT/GM powder Apply 2-3 times daily in skin folds. 60 g 5    tiotropium (SPIRIVA RESPIMAT) 2.5 MCG/ACT AERS inhaler Inhale 2 puffs into the lungs daily 4 g 3    albuterol (PROVENTIL) (2.5 MG/3ML) 0.083% nebulizer solution Take 3 mLs by nebulization every 6 hours as needed for Wheezing or Shortness of Breath 120 vial 0    Lancet Devices (LANCING DEVICE) MISC Provide patient with lancing device appropriate for his machine/lancing needles.  1 each 1    pravastatin (PRAVACHOL) 40 MG tablet Take 1 tablet by mouth nightly 90 tablet 3    Lidocaine 4 % LOTN Apply topically      Spacer/Aero Chamber Mouthpiece MISC 1 each by Does not apply route once as needed (to be used with his inhalers) 1 each 0    blood glucose test strips (ONE TOUCH ULTRA TEST) strip USE ONE STRIP TO TEST THREE TIMES A  strip 2  metolazone (ZAROXOLYN) 2.5 MG tablet Take 2.5 mg by mouth three times a week [facility]      PROAIR  (90 Base) MCG/ACT inhaler INHALE TWO PUFFS BY MOUTH EVERY 6 HOURS AS NEEDED FOR WHEEZING OR SHORTNESS OF BREATH 1 Inhaler 4    spironolactone (ALDACTONE) 50 MG tablet Take 1 tablet by mouth daily 90 tablet 3    vitamin D (CHOLECALCIFEROL) 1000 UNIT TABS tablet Take 1 tablet by mouth daily 90 tablet 3    docusate sodium (STOOL SOFTENER) 100 MG capsule Take 1 capsule by mouth 2 times daily 180 capsule 3    Handicap Placard MISC by Does not apply route Can't walk greater than 200 feet. Expires in 5 years. 1 each 0    tiZANidine (ZANAFLEX) 4 MG tablet TAKE ONE TABLET BY MOUTH EVERY 8 HOURS AS NEEDED FOR BACK PAIN 90 tablet 5    pantoprazole (PROTONIX) 40 MG tablet Take 1 tablet by mouth nightly 90 tablet 3    miconazole (MICOTIN) 2 % powder Apply topically 2 times daily. 45 g 1    Ferrous Sulfate (IRON) 325 (65 Fe) MG TABS Take 1 tablet by mouth daily 90 tablet 3    metoprolol tartrate (LOPRESSOR) 50 MG tablet Take 1 tablet by mouth 2 times daily 180 tablet 3    bumetanide (BUMEX) 1 MG tablet Take 3 tablets by mouth 2 times daily 180 tablet 1    fluticasone-salmeterol (ADVAIR HFA) 230-21 MCG/ACT inhaler Inhale 2 puffs into the lungs 2 times daily 12 g 5    insulin regular human (HUMULIN R) 500 UNIT/ML concentrated injection vial Inject into the skin 3 times daily Indications: 50 in the morning 50 at lunch and 40 at dinner. U-500 insulin--Pt. Takes 50 u at breakfast and lunch and  40 u at supper time.       nitroGLYCERIN (NITROSTAT) 0.4 MG SL tablet Place 1 tablet under the tongue every 5 minutes as needed for Chest pain 25 tablet 3    fluticasone (CUTIVATE) 0.05 % cream Apply topically 2 times daily       fluticasone (FLONASE) 50 MCG/ACT nasal spray 2 sprays by Nasal route daily (Patient taking differently: 2 sprays by Nasal route daily as needed (sinus symptoms) ) 1 Bottle 3 Cardiovascular: Auscultation of heart with regular rate and rhythm  Musculoskeletal. Muscle bulk and tone normal                                                           Muscle strength 5/5 strength throughout                                                                                No dysmetria or dysdiadokinesis  No tremor   Normal fine motor  Gait normal   Orientation Alert and oriented x 3   Attention and concentration normal  Short term memory normal  Language process and speech normal . No aphasia   Cranial nerve 2 Able to detect only light right eye  acuety and visual fields  Cranial nerve 3, 4 and 6 . Extraocular muscles are intact . Pupils are equal and reactive   Cranial nerve 5 . Normal strength of masseter and temporalis . Intact corneal reflex. Normal facial sensation  Cranial nerve 7 normal exam   Cranial nerve 8. Bilateral hearing loss  Cranial nerve 9 and 10. Symmetric palate elevation   Cranial nerve 11 , 5 out of 5 strength   Cranial Nerve 12 midline tongue . No atrophy  Sensation . Decreased pinprick and light touch distal lower extremities   Deep Tendon Reflexes hypoactive with absent ankle jerks   Plantar response flexor bilaterally    Assessment:       Diagnosis Orders   1. Seizures ([address]  MRA Head WO Contrast    MRI Brain WO Contrast   2. Paresthesias  MRA Head WO Contrast    MRI Brain WO Contrast   3. Tinnitus of right ear  MRA Head WO Contrast    MRI Brain WO Contrast   Patient had provoked seizures attributed to prozac none since this medication was stopped .  He is having right scalp paresthesias which are benign along with tinnitus attributed to sensorineural hearing loss       Plan:      Orders Placed This Encounter   Procedures    MRA Head WO Contrast     Standing Status:   Future     Standing Expiration Date:   8/29/2020    MRI Brain WO Contrast     Standing Status:   Future     Standing Expiration Date:   8/29/2020           Domo Reddy MD

## 2019-09-12 ENCOUNTER — HOSPITAL ENCOUNTER (OUTPATIENT)
Age: 54
Discharge: HOME | End: 2019-09-12
Payer: COMMERCIAL

## 2019-09-12 ENCOUNTER — HOSPITAL ENCOUNTER (OUTPATIENT)
Dept: OTHER | Age: 54
Discharge: HOME | End: 2019-09-12
Payer: COMMERCIAL

## 2019-09-12 ENCOUNTER — TELEPHONE (OUTPATIENT)
Dept: FAMILY MEDICINE CLINIC | Age: 54
End: 2019-09-12

## 2019-09-12 ENCOUNTER — HOSPITAL ENCOUNTER (OUTPATIENT)
Dept: PHARMACY | Age: 54
Setting detail: THERAPIES SERIES
Discharge: HOME | End: 2019-09-12
Payer: COMMERCIAL

## 2019-09-12 VITALS
WEIGHT: 315 LBS | DIASTOLIC BLOOD PRESSURE: 82 MMHG | RESPIRATION RATE: 24 BRPM | SYSTOLIC BLOOD PRESSURE: 132 MMHG | BODY MASS INDEX: 55.66 KG/M2 | OXYGEN SATURATION: 98 % | HEART RATE: 86 BPM

## 2019-09-12 DIAGNOSIS — N18.30 CKD (CHRONIC KIDNEY DISEASE) STAGE 3, GFR 30-59 ML/MIN (HCC): ICD-10-CM

## 2019-09-12 DIAGNOSIS — E78.5 HYPERLIPIDEMIA WITH TARGET LDL LESS THAN 70: ICD-10-CM

## 2019-09-12 DIAGNOSIS — Z79.4 TYPE 2 DIABETES MELLITUS WITH DIABETIC POLYNEUROPATHY, WITH LONG-TERM CURRENT USE OF INSULIN (HCC): ICD-10-CM

## 2019-09-12 DIAGNOSIS — E11.42 TYPE 2 DIABETES MELLITUS WITH DIABETIC POLYNEUROPATHY, WITH LONG-TERM CURRENT USE OF INSULIN (HCC): ICD-10-CM

## 2019-09-12 DIAGNOSIS — I10 ESSENTIAL HYPERTENSION: ICD-10-CM

## 2019-09-12 LAB
ALBUMIN SERPL-MCNC: 4.2 G/DL (ref 3.5–5.2)
ALBUMIN/GLOBULIN RATIO: ABNORMAL (ref 1–2.5)
ALP BLD-CCNC: 61 U/L (ref 40–129)
ALT SERPL-CCNC: 14 U/L (ref 5–41)
ANION GAP SERPL CALCULATED.3IONS-SCNC: 13 MMOL/L (ref 9–17)
AST SERPL-CCNC: 14 U/L
BILIRUB SERPL-MCNC: 0.51 MG/DL (ref 0.3–1.2)
BUN BLDV-MCNC: 34 MG/DL (ref 6–20)
BUN/CREAT BLD: ABNORMAL (ref 9–20)
CALCIUM SERPL-MCNC: 9.6 MG/DL (ref 8.6–10.4)
CHLORIDE BLD-SCNC: 98 MMOL/L (ref 98–107)
CHOLESTEROL/HDL RATIO: 4.8
CHOLESTEROL: 139 MG/DL
CO2: 28 MMOL/L (ref 20–31)
CREAT SERPL-MCNC: 1.75 MG/DL (ref 0.7–1.2)
GFR AFRICAN AMERICAN: 49 ML/MIN
GFR NON-AFRICAN AMERICAN: 41 ML/MIN
GFR SERPL CREATININE-BSD FRML MDRD: ABNORMAL ML/MIN/{1.73_M2}
GFR SERPL CREATININE-BSD FRML MDRD: ABNORMAL ML/MIN/{1.73_M2}
GLUCOSE BLD-MCNC: 78 MG/DL (ref 70–99)
HCT VFR BLD CALC: 36.4 % (ref 41–53)
HDLC SERPL-MCNC: 29 MG/DL
HEMOGLOBIN: 11.9 G/DL (ref 13.5–17.5)
LDL CHOLESTEROL: 61 MG/DL (ref 0–130)
MCH RBC QN AUTO: 27.9 PG (ref 26–34)
MCHC RBC AUTO-ENTMCNC: 32.6 G/DL (ref 31–37)
MCV RBC AUTO: 85.5 FL (ref 80–100)
NRBC AUTOMATED: ABNORMAL PER 100 WBC
PDW BLD-RTO: 15.5 % (ref 11.5–14.9)
PLATELET # BLD: 198 K/UL (ref 150–450)
PMV BLD AUTO: 8.2 FL (ref 6–12)
POTASSIUM SERPL-SCNC: 4.3 MMOL/L (ref 3.7–5.3)
RBC # BLD: 4.26 M/UL (ref 4.5–5.9)
SODIUM BLD-SCNC: 139 MMOL/L (ref 135–144)
TOTAL PROTEIN: 7.4 G/DL (ref 6.4–8.3)
TRIGL SERPL-MCNC: 247 MG/DL
VLDLC SERPL CALC-MCNC: ABNORMAL MG/DL (ref 1–30)
WBC # BLD: 9.8 K/UL (ref 3.5–11)

## 2019-09-12 PROCEDURE — 80061 LIPID PANEL: CPT

## 2019-09-12 PROCEDURE — 99212 OFFICE O/P EST SF 10 MIN: CPT

## 2019-09-12 PROCEDURE — 85027 COMPLETE CBC AUTOMATED: CPT

## 2019-09-12 PROCEDURE — 80053 COMPREHEN METABOLIC PANEL: CPT

## 2019-09-12 PROCEDURE — 99211 OFF/OP EST MAY X REQ PHY/QHP: CPT

## 2019-09-12 PROCEDURE — 36415 COLL VENOUS BLD VENIPUNCTURE: CPT

## 2019-09-12 NOTE — TELEPHONE ENCOUNTER
Home care nurse called to inform provider that patient fell Tuesday evening, he just told nurse today , there is no bruises or swelling, patient said its just sore .   This is just [facility]   's patient

## 2019-09-12 NOTE — PROGRESS NOTES
both ears 1/10/2017    Per ENT    Type 2 diabetes mellitus with stage 3 chronic kidney disease, with long-term current use of insulin ([facility] [address] 2016    due to underlying condition with hyperosmolarity without coma    Type II or unspecified type diabetes mellitus without mention of complication, not stated as uncontrolled     uncontrolled    Wears glasses     for reading    Wound of left leg 2019       Social History:    Social History     Tobacco Use    Smoking status: Former Smoker     Packs/day: 2.00     Years: 33.00     Pack years: 66.00     Types: Cigarettes     Start date: 1985     Last attempt to quit: 2019     Years since quittin.4    Smokeless tobacco: Former User     Types: Snuff     Quit date: 1995   Substance Use Topics    Alcohol use: No     Alcohol/week: 0.0 standard drinks       Pertinent Labs:    Lab Results   Component Value Date    LABA1C 6.1 2019    LABA1C 7.2 2019    LABA1C 8.2 (H) 04/15/2019     Lab Results   Component Value Date    CHOL 139 2019    TRIG 247 (H) 2019    HDL 29 (L) 2019     Lab Results   Component Value Date    CREATININE 1.75 (H) 2019    BUN 34 (H) 2019     2019    K 4.3 2019    CL 98 2019    CO2 28 2019     Lab Results   Component Value Date    ALT 14 2019       Weight:  Wt Readings from Last 3 Encounters:   19 (!) 410 lb 6.4 oz (186.2 kg)   19 (!) 417 lb 3.2 oz (189.2 kg)   19 (!) 404 lb (183.3 kg)       Current medications:  Prior to Admission medications    Medication Sig Start Date End Date Taking? Authorizing Provider   oxyCODONE HCl (OXY-IR) 10 MG immediate release tablet Take 1 tablet by mouth every 8 hours as needed for Pain for up to 30 days.  19 Yes Dawna Brooke MD   clopidogrel (PLAVIX) 75 MG tablet Take 1 tablet by mouth daily 19  Yes Dawna Brooke MD   lisinopril (PRINIVIL;ZESTRIL) 5 MG tablet Take 1 tablet by MD Manny   COMFORT ASSIST INSULIN SYRINGE 31G X 5/16\" 1 ML MISC  7/13/16  Yes Historical Provider, MD   aspirin 81 MG EC tablet Take 81 mg by mouth daily. Yes Historical Provider, MD   Spacer/Aero Chamber Mouthpiece [facility] 1 each by Does not apply route once as needed (to be used with his inhalers) 4/15/19 8/30/19  Larissa Raza MD       Immunizations:   Most Recent Immunizations   Administered Date(s) Administered    Hepatitis B Adult (Engerix-B) 07/31/2019    Influenza Virus Vaccine 10/12/2015    Influenza, Quadv, IM, PF (6 mo and older Fluzone, Flulaval, Fluarix, and 3 yrs and older Afluria) 09/18/2018    Pneumococcal Polysaccharide ([id number]) 05/23/2013    Tdap (Boostrix, Adacel) 09/12/2018       Home Blood Glucose Results:   Patient has blood glucose log with him today. Typical fasting (before breakfast) between 6:30/7am running  . Pre lunch between 12-3pm running 120-144. Pre dinner at 6-8pm running . Patient to continue monitoring blood sugar and noting level as well as how much insulin he takes each day. Blood glucose trends noted: see above    Assessment     A1c at goal:Yes : 6.1 (8/7/19)  Blood Pressure at goal: No but very close: 132/82 on 9/12/19. If continues may consider increasing dose. Weight at goal: No: 410lbs but has started diet eating less (smaller plates) and limiting carbs. Will follow weight as patient reports weight loss on scale at home. Physical activity: Patient unable to exercise currently due to hip but will attempt to increase once healed. Physical activity at goal: No  Patient encouraged. Smoking Cessation: Yes: Has finished 12 weeks of Chantix and has not smoked. Motivated to remain abstinent. Cholesterol at target: No: LDL61,   , HDL29, TRIG 247 (9/12/19)  Annual eye exam completed: Yes  Comprehensive Foot Exam Completed:  Yes  Annual urine albumin and serum creatinine: Yes    Statin: Yes    Appropriate?: Yes  Changes made:     ACE/ARB: Yes  Appropriate?: Yes  Changes made:     Aspirin: Yes  Appropriate?: Yes  Changes made:     Eating patterns:    [x]  My plate    []  Mediterranean diet   []  Low sodium   []  DASH diet   [x]  Portion control   [x]  Reduced calorie    []  Fast food / Restaurant  []  High glycemic index foods   []  Sugary beverages   [x]  Other - low carb    Comment: Patient watching carbs.      Current Medications Affecting Diabetes:  Humulin R 500    Compliant: yes  Barriers to medication therapy: none    Medications attempted in the past:  Metformin - cardiologist took him off but patient unsure why  Januvia - PCP took him off but patient unsure why    Recent exacerbations / new problems:  Vision/neurology issues/celluitis    Last office dictation reviewed: yes        type 2 DM under acceptable control as evidenced by A1C 6.1 on 8/7/19    Plan       Counseling at today's visit:   -Continued monitoring of blood glucose with documentation on log.    -Patient encouraged to call with any blood sugars below 70  --Resume exercise when able (hip pain resolves)  -Continued dose of insulin:0.5ml with breakfast and lunch and 0.4mg with dinner.  -Make appt for MRI/CT        Physician Follow-up:  Every 3 months    Medication Management Follow-up:   Diabetes Service   4 weeks per patient request    Electronically signed by Yesenia Rizvi RPH on 9/12/2019 at 9:20 AM

## 2019-09-18 ENCOUNTER — TELEPHONE (OUTPATIENT)
Dept: PHARMACY | Age: 54
End: 2019-09-18

## 2019-09-18 ENCOUNTER — TELEPHONE (OUTPATIENT)
Dept: FAMILY MEDICINE CLINIC | Age: 54
End: 2019-09-18

## 2019-09-18 RX ORDER — VARENICLINE TARTRATE 1 MG/1
1 TABLET, FILM COATED ORAL 2 TIMES DAILY
Qty: 60 TABLET | Refills: 0 | Status: SHIPPED | OUTPATIENT
Start: 2019-09-18 | End: 2019-11-07 | Stop reason: ALTCHOICE

## 2019-09-18 NOTE — TELEPHONE ENCOUNTER
Patient calls to report he has had an emotional breakdown and started smoking again. Has smoked 1 pk/day for last 3 days and does not feel he can stop on his own. Will put patient back on Chantix for 2 weeks to help him stop again then reassess. Patient does not currently have any cigarettes at home and encouraged him to not buy any more. Patient to start Chantix 1mg twice a day today. Yesenia Rizvi,Pharm. D,, BCPS, CACP  9/18/2019  10:17 AM

## 2019-09-24 ENCOUNTER — TELEPHONE (OUTPATIENT)
Dept: PHARMACY | Age: 54
End: 2019-09-24

## 2019-09-27 ENCOUNTER — PATIENT MESSAGE (OUTPATIENT)
Dept: FAMILY MEDICINE CLINIC | Age: 54
End: 2019-09-27

## 2019-09-27 DIAGNOSIS — M51.36 DDD (DEGENERATIVE DISC DISEASE), LUMBAR: ICD-10-CM

## 2019-09-27 DIAGNOSIS — M48.061 SPINAL STENOSIS OF LUMBAR REGION WITHOUT NEUROGENIC CLAUDICATION: ICD-10-CM

## 2019-09-27 DIAGNOSIS — G89.29 CHRONIC BACK PAIN GREATER THAN 3 MONTHS DURATION: ICD-10-CM

## 2019-09-27 DIAGNOSIS — M54.42 CHRONIC MIDLINE LOW BACK PAIN WITH LEFT-SIDED SCIATICA: Primary | ICD-10-CM

## 2019-09-27 DIAGNOSIS — M54.9 CHRONIC BACK PAIN GREATER THAN 3 MONTHS DURATION: ICD-10-CM

## 2019-09-27 DIAGNOSIS — G89.29 CHRONIC MIDLINE LOW BACK PAIN WITH LEFT-SIDED SCIATICA: Primary | ICD-10-CM

## 2019-09-27 RX ORDER — OXYCODONE HYDROCHLORIDE 10 MG/1
10 TABLET ORAL EVERY 8 HOURS PRN
Qty: 90 TABLET | Refills: 0 | Status: SHIPPED | OUTPATIENT
Start: 2019-09-27 | End: 2019-10-23 | Stop reason: SDUPTHER

## 2019-10-03 ENCOUNTER — PATIENT MESSAGE (OUTPATIENT)
Dept: FAMILY MEDICINE CLINIC | Age: 54
End: 2019-10-03

## 2019-10-03 DIAGNOSIS — H60.393 CHRONIC BACTERIAL OTITIS EXTERNA OF BOTH EARS: Primary | ICD-10-CM

## 2019-10-03 RX ORDER — CIPROFLOXACIN AND DEXAMETHASONE 3; 1 MG/ML; MG/ML
4 SUSPENSION/ DROPS AURICULAR (OTIC) 2 TIMES DAILY
Qty: 1 BOTTLE | Refills: 0 | Status: SHIPPED | OUTPATIENT
Start: 2019-10-03 | End: 2019-10-10 | Stop reason: ALTCHOICE

## 2019-10-04 ENCOUNTER — NURSE ONLY (OUTPATIENT)
Dept: FAMILY MEDICINE CLINIC | Age: 54
End: 2019-10-04
Payer: COMMERCIAL

## 2019-10-04 DIAGNOSIS — Z23 NEED FOR INFLUENZA VACCINATION: Primary | ICD-10-CM

## 2019-10-04 PROCEDURE — 90686 IIV4 VACC NO PRSV 0.5 ML IM: CPT | Performed by: FAMILY MEDICINE

## 2019-10-04 PROCEDURE — G0008 ADMIN INFLUENZA VIRUS VAC: HCPCS | Performed by: FAMILY MEDICINE

## 2019-10-09 DIAGNOSIS — J44.1 COPD EXACERBATION (HCC): ICD-10-CM

## 2019-10-09 DIAGNOSIS — J44.9 MIXED TYPE COPD (CHRONIC OBSTRUCTIVE PULMONARY DISEASE) (HCC): Primary | ICD-10-CM

## 2019-10-09 RX ORDER — TIOTROPIUM BROMIDE INHALATION SPRAY 3.12 UG/1
SPRAY, METERED RESPIRATORY (INHALATION)
Qty: 4 G | Refills: 5 | Status: SHIPPED | OUTPATIENT
Start: 2019-10-09 | End: 2019-10-10 | Stop reason: SDUPTHER

## 2019-10-10 ENCOUNTER — TELEPHONE (OUTPATIENT)
Dept: PHARMACY | Age: 54
End: 2019-10-10

## 2019-10-10 ENCOUNTER — HOSPITAL ENCOUNTER (OUTPATIENT)
Dept: PHARMACY | Age: 54
Setting detail: THERAPIES SERIES
Discharge: HOME | End: 2019-10-10
Payer: COMMERCIAL

## 2019-10-10 ENCOUNTER — TELEPHONE (OUTPATIENT)
Dept: FAMILY MEDICINE CLINIC | Age: 54
End: 2019-10-10

## 2019-10-10 ENCOUNTER — HOSPITAL ENCOUNTER (OUTPATIENT)
Dept: OTHER | Age: 54
Discharge: HOME | End: 2019-10-10
Payer: COMMERCIAL

## 2019-10-10 VITALS
SYSTOLIC BLOOD PRESSURE: 130 MMHG | DIASTOLIC BLOOD PRESSURE: 70 MMHG | RESPIRATION RATE: 22 BRPM | BODY MASS INDEX: 55.96 KG/M2 | OXYGEN SATURATION: 98 % | WEIGHT: 315 LBS | HEART RATE: 79 BPM

## 2019-10-10 DIAGNOSIS — F33.2 MDD (MAJOR DEPRESSIVE DISORDER), RECURRENT SEVERE, WITHOUT PSYCHOSIS (HCC): ICD-10-CM

## 2019-10-10 DIAGNOSIS — M54.9 CHRONIC BACK PAIN GREATER THAN 3 MONTHS DURATION: ICD-10-CM

## 2019-10-10 DIAGNOSIS — I50.32 CHRONIC DIASTOLIC CONGESTIVE HEART FAILURE (HCC): ICD-10-CM

## 2019-10-10 DIAGNOSIS — I10 ESSENTIAL HYPERTENSION: ICD-10-CM

## 2019-10-10 DIAGNOSIS — G89.29 CHRONIC BACK PAIN GREATER THAN 3 MONTHS DURATION: ICD-10-CM

## 2019-10-10 DIAGNOSIS — J44.9 MIXED TYPE COPD (CHRONIC OBSTRUCTIVE PULMONARY DISEASE) (HCC): ICD-10-CM

## 2019-10-10 DIAGNOSIS — D50.0 IRON DEFICIENCY ANEMIA DUE TO CHRONIC BLOOD LOSS: ICD-10-CM

## 2019-10-10 DIAGNOSIS — R60.0 BILATERAL LEG EDEMA: ICD-10-CM

## 2019-10-10 PROCEDURE — 99213 OFFICE O/P EST LOW 20 MIN: CPT

## 2019-10-10 PROCEDURE — 99211 OFF/OP EST MAY X REQ PHY/QHP: CPT

## 2019-10-10 RX ORDER — SYRINGE AND NEEDLE,INSULIN,1ML 31 GX5/16"
SYRINGE, EMPTY DISPOSABLE MISCELLANEOUS
Qty: 300 EACH | Refills: 3 | Status: SHIPPED | OUTPATIENT
Start: 2019-10-10 | End: 2020-09-30 | Stop reason: SDUPTHER

## 2019-10-10 RX ORDER — LISINOPRIL 5 MG/1
5 TABLET ORAL DAILY
Qty: 90 TABLET | Refills: 3 | Status: SHIPPED | OUTPATIENT
Start: 2019-10-10 | End: 2020-11-02 | Stop reason: SDUPTHER

## 2019-10-10 RX ORDER — BUMETANIDE 1 MG/1
3 TABLET ORAL 2 TIMES DAILY
Qty: 540 TABLET | Refills: 3 | Status: SHIPPED | OUTPATIENT
Start: 2019-10-10 | End: 2021-03-08

## 2019-10-10 RX ORDER — TIZANIDINE 4 MG/1
TABLET ORAL
Qty: 90 TABLET | Refills: 5 | Status: SHIPPED | OUTPATIENT
Start: 2019-10-10 | End: 2020-07-08 | Stop reason: SDUPTHER

## 2019-10-10 RX ORDER — PNV NO.95/FERROUS FUM/FOLIC AC 28MG-0.8MG
1 TABLET ORAL DAILY
Qty: 90 TABLET | Refills: 3 | Status: SHIPPED | OUTPATIENT
Start: 2019-10-10 | End: 2019-12-02 | Stop reason: SDUPTHER

## 2019-10-10 RX ORDER — METOPROLOL TARTRATE 50 MG/1
50 TABLET, FILM COATED ORAL 2 TIMES DAILY
Qty: 180 TABLET | Refills: 3 | Status: SHIPPED | OUTPATIENT
Start: 2019-10-10 | End: 2020-11-04 | Stop reason: SDUPTHER

## 2019-10-10 RX ORDER — VENLAFAXINE HYDROCHLORIDE 75 MG/1
75 CAPSULE, EXTENDED RELEASE ORAL DAILY
Qty: 90 CAPSULE | Refills: 3 | Status: SHIPPED | OUTPATIENT
Start: 2019-10-10 | End: 2020-02-13 | Stop reason: ALTCHOICE

## 2019-10-22 ENCOUNTER — OFFICE VISIT (OUTPATIENT)
Dept: BEHAVIORAL/MENTAL HEALTH CLINIC | Age: 54
End: 2019-10-22
Payer: COMMERCIAL

## 2019-10-22 DIAGNOSIS — F34.1 PERSISTENT DEPRESSIVE DISORDER: Primary | ICD-10-CM

## 2019-10-22 PROCEDURE — 90791 PSYCH DIAGNOSTIC EVALUATION: CPT | Performed by: PSYCHOLOGIST

## 2019-10-23 DIAGNOSIS — M48.061 SPINAL STENOSIS OF LUMBAR REGION WITHOUT NEUROGENIC CLAUDICATION: ICD-10-CM

## 2019-10-23 DIAGNOSIS — M54.9 CHRONIC BACK PAIN GREATER THAN 3 MONTHS DURATION: Primary | ICD-10-CM

## 2019-10-23 DIAGNOSIS — G89.29 CHRONIC BACK PAIN GREATER THAN 3 MONTHS DURATION: Primary | ICD-10-CM

## 2019-10-23 DIAGNOSIS — G89.29 CHRONIC MIDLINE LOW BACK PAIN WITH LEFT-SIDED SCIATICA: ICD-10-CM

## 2019-10-23 DIAGNOSIS — M54.42 CHRONIC MIDLINE LOW BACK PAIN WITH LEFT-SIDED SCIATICA: ICD-10-CM

## 2019-10-23 DIAGNOSIS — M51.36 DDD (DEGENERATIVE DISC DISEASE), LUMBAR: ICD-10-CM

## 2019-10-23 RX ORDER — OXYCODONE HYDROCHLORIDE 10 MG/1
10 TABLET ORAL EVERY 8 HOURS PRN
Qty: 90 TABLET | Refills: 0 | Status: SHIPPED | OUTPATIENT
Start: 2019-10-23 | End: 2019-11-19 | Stop reason: SDUPTHER

## 2019-11-04 ENCOUNTER — ANESTHESIA EVENT (OUTPATIENT)
Dept: OPERATING ROOM | Age: 54
End: 2019-11-04
Payer: COMMERCIAL

## 2019-11-05 ENCOUNTER — ANESTHESIA (OUTPATIENT)
Dept: OPERATING ROOM | Age: 54
End: 2019-11-05
Payer: COMMERCIAL

## 2019-11-05 ENCOUNTER — HOSPITAL ENCOUNTER (OUTPATIENT)
Age: 54
Setting detail: OUTPATIENT SURGERY
Discharge: HOME | End: 2019-11-05
Attending: OPHTHALMOLOGY | Admitting: OPHTHALMOLOGY
Payer: COMMERCIAL

## 2019-11-05 VITALS
TEMPERATURE: 97.7 F | HEART RATE: 78 BPM | DIASTOLIC BLOOD PRESSURE: 69 MMHG | HEIGHT: 73 IN | SYSTOLIC BLOOD PRESSURE: 153 MMHG | RESPIRATION RATE: 14 BRPM | BODY MASS INDEX: 41.75 KG/M2 | WEIGHT: 315 LBS | OXYGEN SATURATION: 97 %

## 2019-11-05 VITALS
OXYGEN SATURATION: 99 % | SYSTOLIC BLOOD PRESSURE: 130 MMHG | RESPIRATION RATE: 19 BRPM | DIASTOLIC BLOOD PRESSURE: 72 MMHG

## 2019-11-05 DIAGNOSIS — H60.393 CHRONIC BACTERIAL OTITIS EXTERNA OF BOTH EARS: ICD-10-CM

## 2019-11-05 PROBLEM — H25.11 AGE-RELATED NUCLEAR CATARACT, RIGHT: Status: RESOLVED | Noted: 2019-11-05 | Resolved: 2019-11-05

## 2019-11-05 LAB — GLUCOSE BLD-MCNC: 64 MG/DL (ref 75–110)

## 2019-11-05 PROCEDURE — 6360000002 HC RX W HCPCS: Performed by: OPHTHALMOLOGY

## 2019-11-05 PROCEDURE — 82947 ASSAY GLUCOSE BLOOD QUANT: CPT

## 2019-11-05 PROCEDURE — 3600000002 HC SURGERY LEVEL 2 BASE: Performed by: OPHTHALMOLOGY

## 2019-11-05 PROCEDURE — 2709999900 HC NON-CHARGEABLE SUPPLY: Performed by: OPHTHALMOLOGY

## 2019-11-05 PROCEDURE — 7100000000 HC PACU RECOVERY - FIRST 15 MIN: Performed by: OPHTHALMOLOGY

## 2019-11-05 PROCEDURE — 3700000001 HC ADD 15 MINUTES (ANESTHESIA): Performed by: OPHTHALMOLOGY

## 2019-11-05 PROCEDURE — 2580000003 HC RX 258: Performed by: ANESTHESIOLOGY

## 2019-11-05 PROCEDURE — V2632 POST CHMBR INTRAOCULAR LENS: HCPCS | Performed by: OPHTHALMOLOGY

## 2019-11-05 PROCEDURE — 2500000003 HC RX 250 WO HCPCS: Performed by: OPHTHALMOLOGY

## 2019-11-05 PROCEDURE — 2580000003 HC RX 258: Performed by: OPHTHALMOLOGY

## 2019-11-05 PROCEDURE — 3700000000 HC ANESTHESIA ATTENDED CARE: Performed by: OPHTHALMOLOGY

## 2019-11-05 PROCEDURE — 3600000012 HC SURGERY LEVEL 2 ADDTL 15MIN: Performed by: OPHTHALMOLOGY

## 2019-11-05 PROCEDURE — 6370000000 HC RX 637 (ALT 250 FOR IP): Performed by: OPHTHALMOLOGY

## 2019-11-05 PROCEDURE — 7100000001 HC PACU RECOVERY - ADDTL 15 MIN: Performed by: OPHTHALMOLOGY

## 2019-11-05 PROCEDURE — 6360000002 HC RX W HCPCS: Performed by: NURSE ANESTHETIST, CERTIFIED REGISTERED

## 2019-11-05 DEVICE — LENS INTOCU +18.0 DIOPT L13MM DIA6MM 0DEG HAPTIC ANG A: Type: IMPLANTABLE DEVICE | Site: EYE | Status: FUNCTIONAL

## 2019-11-05 RX ORDER — TOBRAMYCIN 3 MG/ML
1 SOLUTION/ DROPS OPHTHALMIC EVERY 5 MIN PRN
Status: DISCONTINUED | OUTPATIENT
Start: 2019-11-05 | End: 2019-11-05 | Stop reason: HOSPADM

## 2019-11-05 RX ORDER — CIPROFLOXACIN AND DEXAMETHASONE 3; 1 MG/ML; MG/ML
4 SUSPENSION/ DROPS AURICULAR (OTIC) 2 TIMES DAILY
Qty: 1 BOTTLE | Refills: 0 | Status: SHIPPED | OUTPATIENT
Start: 2019-11-05 | End: 2019-11-12

## 2019-11-05 RX ORDER — TIMOLOL MALEATE 5 MG/ML
SOLUTION/ DROPS OPHTHALMIC PRN
Status: DISCONTINUED | OUTPATIENT
Start: 2019-11-05 | End: 2019-11-05 | Stop reason: ALTCHOICE

## 2019-11-05 RX ORDER — PHENYLEPHRINE HCL 2.5 %
1 DROPS OPHTHALMIC (EYE) EVERY 5 MIN PRN
Status: COMPLETED | OUTPATIENT
Start: 2019-11-05 | End: 2019-11-05

## 2019-11-05 RX ORDER — TOBRAMYCIN 3 MG/ML
SOLUTION/ DROPS OPHTHALMIC PRN
Status: DISCONTINUED | OUTPATIENT
Start: 2019-11-05 | End: 2019-11-05 | Stop reason: ALTCHOICE

## 2019-11-05 RX ORDER — BUPIVACAINE HYDROCHLORIDE 5 MG/ML
1 INJECTION, SOLUTION EPIDURAL; INTRACAUDAL SEE ADMIN INSTRUCTIONS
Status: DISCONTINUED | OUTPATIENT
Start: 2019-11-05 | End: 2019-11-05 | Stop reason: HOSPADM

## 2019-11-05 RX ORDER — MIDAZOLAM HYDROCHLORIDE 1 MG/ML
INJECTION INTRAMUSCULAR; INTRAVENOUS PRN
Status: DISCONTINUED | OUTPATIENT
Start: 2019-11-05 | End: 2019-11-05 | Stop reason: SDUPTHER

## 2019-11-05 RX ORDER — CYCLOPENTOLATE HYDROCHLORIDE 10 MG/ML
1 SOLUTION/ DROPS OPHTHALMIC EVERY 5 MIN PRN
Status: COMPLETED | OUTPATIENT
Start: 2019-11-05 | End: 2019-11-05

## 2019-11-05 RX ORDER — KETOROLAC TROMETHAMINE 5 MG/ML
1 SOLUTION OPHTHALMIC EVERY 5 MIN PRN
Status: COMPLETED | OUTPATIENT
Start: 2019-11-05 | End: 2019-11-05

## 2019-11-05 RX ORDER — LIDOCAINE HYDROCHLORIDE 10 MG/ML
INJECTION, SOLUTION INFILTRATION; PERINEURAL PRN
Status: DISCONTINUED | OUTPATIENT
Start: 2019-11-05 | End: 2019-11-05 | Stop reason: ALTCHOICE

## 2019-11-05 RX ORDER — SODIUM CHLORIDE, SODIUM LACTATE, POTASSIUM CHLORIDE, CALCIUM CHLORIDE 600; 310; 30; 20 MG/100ML; MG/100ML; MG/100ML; MG/100ML
INJECTION, SOLUTION INTRAVENOUS CONTINUOUS
Status: DISCONTINUED | OUTPATIENT
Start: 2019-11-05 | End: 2019-11-05 | Stop reason: HOSPADM

## 2019-11-05 RX ORDER — BUPIVACAINE HYDROCHLORIDE 5 MG/ML
INJECTION, SOLUTION EPIDURAL; INTRACAUDAL PRN
Status: DISCONTINUED | OUTPATIENT
Start: 2019-11-05 | End: 2019-11-05 | Stop reason: ALTCHOICE

## 2019-11-05 RX ORDER — BALANCED SALT SOLUTION ENRICHED WITH BICARBONATE, DEXTROSE, AND GLUTATHIONE
KIT INTRAOCULAR PRN
Status: DISCONTINUED | OUTPATIENT
Start: 2019-11-05 | End: 2019-11-05 | Stop reason: ALTCHOICE

## 2019-11-05 RX ORDER — DEXTROSE MONOHYDRATE 50 MG/ML
INJECTION, SOLUTION INTRAVENOUS ONCE
Status: COMPLETED | OUTPATIENT
Start: 2019-11-05 | End: 2019-11-05

## 2019-11-05 RX ADMIN — DEXTROSE MONOHYDRATE: 50 INJECTION, SOLUTION INTRAVENOUS at 08:05

## 2019-11-05 RX ADMIN — CYCLOPENTOLATE HYDROCHLORIDE 1 DROP: 10 SOLUTION/ DROPS OPHTHALMIC at 07:35

## 2019-11-05 RX ADMIN — PHENYLEPHRINE HYDROCHLORIDE 1 DROP: 25 SOLUTION/ DROPS OPHTHALMIC at 07:35

## 2019-11-05 RX ADMIN — KETOROLAC TROMETHAMINE 1 DROP: 5 SOLUTION OPHTHALMIC at 07:34

## 2019-11-05 RX ADMIN — BUPIVACAINE HYDROCHLORIDE 5 MG: 5 INJECTION, SOLUTION EPIDURAL; INTRACAUDAL; PERINEURAL at 07:52

## 2019-11-05 RX ADMIN — TOBRAMYCIN 1 DROP: 3 SOLUTION/ DROPS OPHTHALMIC at 07:34

## 2019-11-05 RX ADMIN — CYCLOPENTOLATE HYDROCHLORIDE 1 DROP: 10 SOLUTION/ DROPS OPHTHALMIC at 07:51

## 2019-11-05 RX ADMIN — MIDAZOLAM 0.5 MG: 1 INJECTION INTRAMUSCULAR; INTRAVENOUS at 08:53

## 2019-11-05 RX ADMIN — TOBRAMYCIN 1 DROP: 3 SOLUTION/ DROPS OPHTHALMIC at 07:52

## 2019-11-05 RX ADMIN — PHENYLEPHRINE HYDROCHLORIDE 1 DROP: 25 SOLUTION/ DROPS OPHTHALMIC at 07:51

## 2019-11-05 RX ADMIN — SODIUM CHLORIDE, POTASSIUM CHLORIDE, SODIUM LACTATE AND CALCIUM CHLORIDE: 600; 310; 30; 20 INJECTION, SOLUTION INTRAVENOUS at 07:56

## 2019-11-05 RX ADMIN — KETOROLAC TROMETHAMINE 1 DROP: 5 SOLUTION OPHTHALMIC at 07:51

## 2019-11-05 RX ADMIN — MIDAZOLAM 0.5 MG: 1 INJECTION INTRAMUSCULAR; INTRAVENOUS at 08:51

## 2019-11-05 RX ADMIN — BUPIVACAINE HYDROCHLORIDE 5 MG: 5 INJECTION, SOLUTION EPIDURAL; INTRACAUDAL; PERINEURAL at 07:34

## 2019-11-05 ASSESSMENT — PAIN SCALES - GENERAL
PAINLEVEL_OUTOF10: 0

## 2019-11-05 ASSESSMENT — PULMONARY FUNCTION TESTS
PIF_VALUE: 0

## 2019-11-05 ASSESSMENT — ENCOUNTER SYMPTOMS: SHORTNESS OF BREATH: 1

## 2019-11-05 ASSESSMENT — PAIN - FUNCTIONAL ASSESSMENT: PAIN_FUNCTIONAL_ASSESSMENT: 0-10

## 2019-11-05 ASSESSMENT — COPD QUESTIONNAIRES: CAT_SEVERITY: NO INTERVAL CHANGE

## 2019-11-07 ENCOUNTER — HOSPITAL ENCOUNTER (OUTPATIENT)
Dept: OTHER | Age: 54
Discharge: HOME | End: 2019-11-07
Payer: COMMERCIAL

## 2019-11-07 ENCOUNTER — HOSPITAL ENCOUNTER (OUTPATIENT)
Dept: PHARMACY | Age: 54
Setting detail: THERAPIES SERIES
Discharge: HOME | End: 2019-11-07
Payer: COMMERCIAL

## 2019-11-07 ENCOUNTER — TELEPHONE (OUTPATIENT)
Dept: PHARMACY | Age: 54
End: 2019-11-07

## 2019-11-07 VITALS
OXYGEN SATURATION: 97 % | HEART RATE: 95 BPM | WEIGHT: 315 LBS | BODY MASS INDEX: 54.49 KG/M2 | RESPIRATION RATE: 16 BRPM | SYSTOLIC BLOOD PRESSURE: 132 MMHG | DIASTOLIC BLOOD PRESSURE: 72 MMHG

## 2019-11-07 DIAGNOSIS — Z79.4 TYPE 2 DIABETES MELLITUS WITH DIABETIC POLYNEUROPATHY, WITH LONG-TERM CURRENT USE OF INSULIN (HCC): ICD-10-CM

## 2019-11-07 DIAGNOSIS — E11.42 TYPE 2 DIABETES MELLITUS WITH DIABETIC POLYNEUROPATHY, WITH LONG-TERM CURRENT USE OF INSULIN (HCC): ICD-10-CM

## 2019-11-07 DIAGNOSIS — E11.40 TYPE 2 DIABETES MELLITUS WITH DIABETIC NEUROPATHY (HCC): ICD-10-CM

## 2019-11-07 PROCEDURE — 99213 OFFICE O/P EST LOW 20 MIN: CPT

## 2019-11-07 PROCEDURE — 99211 OFF/OP EST MAY X REQ PHY/QHP: CPT

## 2019-11-07 RX ORDER — LANCETS
EACH MISCELLANEOUS
Qty: 300 EACH | Refills: 3 | OUTPATIENT
Start: 2019-11-07

## 2019-11-19 ENCOUNTER — OFFICE VISIT (OUTPATIENT)
Dept: BEHAVIORAL/MENTAL HEALTH CLINIC | Age: 54
End: 2019-11-19
Payer: COMMERCIAL

## 2019-11-19 ENCOUNTER — OFFICE VISIT (OUTPATIENT)
Dept: FAMILY MEDICINE CLINIC | Age: 54
End: 2019-11-19
Payer: COMMERCIAL

## 2019-11-19 VITALS
WEIGHT: 315 LBS | OXYGEN SATURATION: 95 % | SYSTOLIC BLOOD PRESSURE: 140 MMHG | HEART RATE: 83 BPM | DIASTOLIC BLOOD PRESSURE: 86 MMHG | HEIGHT: 72 IN | BODY MASS INDEX: 42.66 KG/M2

## 2019-11-19 DIAGNOSIS — M48.061 SPINAL STENOSIS OF LUMBAR REGION WITHOUT NEUROGENIC CLAUDICATION: ICD-10-CM

## 2019-11-19 DIAGNOSIS — F34.1 PERSISTENT DEPRESSIVE DISORDER: Primary | ICD-10-CM

## 2019-11-19 DIAGNOSIS — Z12.5 SCREENING PSA (PROSTATE SPECIFIC ANTIGEN): ICD-10-CM

## 2019-11-19 DIAGNOSIS — Z79.4 TYPE 2 DIABETES MELLITUS WITH DIABETIC POLYNEUROPATHY, WITH LONG-TERM CURRENT USE OF INSULIN (HCC): Primary | ICD-10-CM

## 2019-11-19 DIAGNOSIS — J44.9 MIXED TYPE COPD (CHRONIC OBSTRUCTIVE PULMONARY DISEASE) (HCC): ICD-10-CM

## 2019-11-19 DIAGNOSIS — L30.9 DERMATITIS: ICD-10-CM

## 2019-11-19 DIAGNOSIS — I50.32 CHRONIC DIASTOLIC CONGESTIVE HEART FAILURE (HCC): ICD-10-CM

## 2019-11-19 DIAGNOSIS — M54.42 CHRONIC MIDLINE LOW BACK PAIN WITH LEFT-SIDED SCIATICA: ICD-10-CM

## 2019-11-19 DIAGNOSIS — M51.36 DDD (DEGENERATIVE DISC DISEASE), LUMBAR: ICD-10-CM

## 2019-11-19 DIAGNOSIS — F33.0 MILD EPISODE OF RECURRENT MAJOR DEPRESSIVE DISORDER (HCC): ICD-10-CM

## 2019-11-19 DIAGNOSIS — G89.29 CHRONIC MIDLINE LOW BACK PAIN WITH LEFT-SIDED SCIATICA: ICD-10-CM

## 2019-11-19 DIAGNOSIS — I10 ESSENTIAL HYPERTENSION: ICD-10-CM

## 2019-11-19 DIAGNOSIS — E11.42 TYPE 2 DIABETES MELLITUS WITH DIABETIC POLYNEUROPATHY, WITH LONG-TERM CURRENT USE OF INSULIN (HCC): Primary | ICD-10-CM

## 2019-11-19 DIAGNOSIS — D64.9 ANEMIA, UNSPECIFIED TYPE: ICD-10-CM

## 2019-11-19 LAB — HBA1C MFR BLD: 6.2 %

## 2019-11-19 PROCEDURE — G8482 FLU IMMUNIZE ORDER/ADMIN: HCPCS | Performed by: FAMILY MEDICINE

## 2019-11-19 PROCEDURE — 3044F HG A1C LEVEL LT 7.0%: CPT | Performed by: FAMILY MEDICINE

## 2019-11-19 PROCEDURE — 90837 PSYTX W PT 60 MINUTES: CPT | Performed by: PSYCHOLOGIST

## 2019-11-19 PROCEDURE — G8598 ASA/ANTIPLAT THER USED: HCPCS | Performed by: FAMILY MEDICINE

## 2019-11-19 PROCEDURE — 1036F TOBACCO NON-USER: CPT | Performed by: FAMILY MEDICINE

## 2019-11-19 PROCEDURE — 83036 HEMOGLOBIN GLYCOSYLATED A1C: CPT | Performed by: FAMILY MEDICINE

## 2019-11-19 PROCEDURE — G8926 SPIRO NO PERF OR DOC: HCPCS | Performed by: FAMILY MEDICINE

## 2019-11-19 PROCEDURE — 3017F COLORECTAL CA SCREEN DOC REV: CPT | Performed by: FAMILY MEDICINE

## 2019-11-19 PROCEDURE — 99215 OFFICE O/P EST HI 40 MIN: CPT | Performed by: FAMILY MEDICINE

## 2019-11-19 PROCEDURE — 3023F SPIROM DOC REV: CPT | Performed by: FAMILY MEDICINE

## 2019-11-19 PROCEDURE — 2022F DILAT RTA XM EVC RTNOPTHY: CPT | Performed by: FAMILY MEDICINE

## 2019-11-19 PROCEDURE — G8417 CALC BMI ABV UP PARAM F/U: HCPCS | Performed by: FAMILY MEDICINE

## 2019-11-19 PROCEDURE — G8427 DOCREV CUR MEDS BY ELIG CLIN: HCPCS | Performed by: FAMILY MEDICINE

## 2019-11-19 RX ORDER — OXYCODONE HYDROCHLORIDE 10 MG/1
10 TABLET ORAL EVERY 8 HOURS PRN
Qty: 90 TABLET | Refills: 0 | Status: SHIPPED | OUTPATIENT
Start: 2019-11-19 | End: 2019-12-18 | Stop reason: SDUPTHER

## 2019-11-19 RX ORDER — NALOXONE HYDROCHLORIDE 4 MG/.1ML
1 SPRAY NASAL PRN
Qty: 1 EACH | Refills: 3 | Status: SHIPPED | OUTPATIENT
Start: 2019-11-19 | End: 2020-09-30 | Stop reason: SDUPTHER

## 2019-11-19 RX ORDER — KETOCONAZOLE 20 MG/G
CREAM TOPICAL
Qty: 1 TUBE | Refills: 1 | Status: SHIPPED | OUTPATIENT
Start: 2019-11-19 | End: 2020-08-31 | Stop reason: SDUPTHER

## 2019-11-19 ASSESSMENT — ENCOUNTER SYMPTOMS
VOMITING: 0
CONSTIPATION: 0
BACK PAIN: 1
ABDOMINAL DISTENTION: 0
NAUSEA: 0
SHORTNESS OF BREATH: 1
APNEA: 1
COUGH: 1
ABDOMINAL PAIN: 1
CHEST TIGHTNESS: 1
DIARRHEA: 0
WHEEZING: 0

## 2019-11-19 ASSESSMENT — PATIENT HEALTH QUESTIONNAIRE - PHQ9
SUM OF ALL RESPONSES TO PHQ QUESTIONS 1-9: 2
1. LITTLE INTEREST OR PLEASURE IN DOING THINGS: 1
SUM OF ALL RESPONSES TO PHQ QUESTIONS 1-9: 2
2. FEELING DOWN, DEPRESSED OR HOPELESS: 1
SUM OF ALL RESPONSES TO PHQ9 QUESTIONS 1 & 2: 2

## 2019-11-25 ASSESSMENT — ENCOUNTER SYMPTOMS: BLOOD IN STOOL: 0

## 2019-11-26 ENCOUNTER — HOSPITAL ENCOUNTER (OUTPATIENT)
Age: 54
Setting detail: SPECIMEN
Discharge: HOME | End: 2019-11-26
Payer: COMMERCIAL

## 2019-11-26 DIAGNOSIS — I10 ESSENTIAL HYPERTENSION: ICD-10-CM

## 2019-11-26 DIAGNOSIS — I50.32 CHRONIC DIASTOLIC CONGESTIVE HEART FAILURE (HCC): ICD-10-CM

## 2019-11-26 DIAGNOSIS — Z12.5 SCREENING PSA (PROSTATE SPECIFIC ANTIGEN): ICD-10-CM

## 2019-11-26 DIAGNOSIS — D64.9 ANEMIA, UNSPECIFIED TYPE: ICD-10-CM

## 2019-11-26 DIAGNOSIS — E11.42 TYPE 2 DIABETES MELLITUS WITH DIABETIC POLYNEUROPATHY, WITH LONG-TERM CURRENT USE OF INSULIN (HCC): ICD-10-CM

## 2019-11-26 DIAGNOSIS — Z79.4 TYPE 2 DIABETES MELLITUS WITH DIABETIC POLYNEUROPATHY, WITH LONG-TERM CURRENT USE OF INSULIN (HCC): ICD-10-CM

## 2019-11-26 LAB
ABSOLUTE EOS #: 0.2 K/UL (ref 0–0.4)
ABSOLUTE IMMATURE GRANULOCYTE: ABNORMAL K/UL (ref 0–0.3)
ABSOLUTE LYMPH #: 1.1 K/UL (ref 1–4.8)
ABSOLUTE MONO #: 0.7 K/UL (ref 0.1–1.3)
ALBUMIN SERPL-MCNC: 4.4 G/DL (ref 3.5–5.2)
ALBUMIN/GLOBULIN RATIO: ABNORMAL (ref 1–2.5)
ALP BLD-CCNC: 72 U/L (ref 40–129)
ALT SERPL-CCNC: 14 U/L (ref 5–41)
ANION GAP SERPL CALCULATED.3IONS-SCNC: 17 MMOL/L (ref 9–17)
AST SERPL-CCNC: 16 U/L
BASOPHILS # BLD: 0 % (ref 0–2)
BASOPHILS ABSOLUTE: 0 K/UL (ref 0–0.2)
BILIRUB SERPL-MCNC: 0.36 MG/DL (ref 0.3–1.2)
BILIRUBIN URINE: NEGATIVE
BNP INTERPRETATION: NORMAL
BUN BLDV-MCNC: 43 MG/DL (ref 6–20)
BUN/CREAT BLD: ABNORMAL (ref 9–20)
CALCIUM SERPL-MCNC: 9.9 MG/DL (ref 8.6–10.4)
CHLORIDE BLD-SCNC: 97 MMOL/L (ref 98–107)
CO2: 27 MMOL/L (ref 20–31)
COLOR: YELLOW
COMMENT UA: NORMAL
CREAT SERPL-MCNC: 1.8 MG/DL (ref 0.7–1.2)
DIFFERENTIAL TYPE: ABNORMAL
EOSINOPHILS RELATIVE PERCENT: 2 % (ref 0–4)
FOLATE: 16.9 NG/ML
GFR AFRICAN AMERICAN: 48 ML/MIN
GFR NON-AFRICAN AMERICAN: 40 ML/MIN
GFR SERPL CREATININE-BSD FRML MDRD: ABNORMAL ML/MIN/{1.73_M2}
GFR SERPL CREATININE-BSD FRML MDRD: ABNORMAL ML/MIN/{1.73_M2}
GLUCOSE BLD-MCNC: 93 MG/DL (ref 70–99)
GLUCOSE URINE: NEGATIVE
HCT VFR BLD CALC: 40.5 % (ref 41–53)
HEMOGLOBIN: 13.3 G/DL (ref 13.5–17.5)
IMMATURE GRANULOCYTES: ABNORMAL %
IRON SATURATION: 14 % (ref 20–55)
IRON: 50 UG/DL (ref 59–158)
KETONES, URINE: NEGATIVE
LEUKOCYTE ESTERASE, URINE: NEGATIVE
LYMPHOCYTES # BLD: 11 % (ref 24–44)
MCH RBC QN AUTO: 27.1 PG (ref 26–34)
MCHC RBC AUTO-ENTMCNC: 32.7 G/DL (ref 31–37)
MCV RBC AUTO: 82.8 FL (ref 80–100)
MONOCYTES # BLD: 7 % (ref 1–7)
NITRITE, URINE: NEGATIVE
NRBC AUTOMATED: ABNORMAL PER 100 WBC
PDW BLD-RTO: 15.9 % (ref 11.5–14.9)
PH UA: 6.5 (ref 5–8)
PLATELET # BLD: 214 K/UL (ref 150–450)
PLATELET ESTIMATE: ABNORMAL
PMV BLD AUTO: 9.4 FL (ref 6–12)
POTASSIUM SERPL-SCNC: 4.9 MMOL/L (ref 3.7–5.3)
PRO-BNP: 46 PG/ML
PROSTATE SPECIFIC ANTIGEN: 1.09 UG/L
PROTEIN UA: NEGATIVE
RBC # BLD: 4.89 M/UL (ref 4.5–5.9)
RBC # BLD: ABNORMAL 10*6/UL
SEG NEUTROPHILS: 80 % (ref 36–66)
SEGMENTED NEUTROPHILS ABSOLUTE COUNT: 8.2 K/UL (ref 1.3–9.1)
SODIUM BLD-SCNC: 141 MMOL/L (ref 135–144)
SPECIFIC GRAVITY UA: 1.02 (ref 1–1.03)
TOTAL IRON BINDING CAPACITY: 352 UG/DL (ref 250–450)
TOTAL PROTEIN: 7.9 G/DL (ref 6.4–8.3)
TURBIDITY: CLEAR
UNSATURATED IRON BINDING CAPACITY: 302 UG/DL (ref 112–347)
URINE HGB: NEGATIVE
UROBILINOGEN, URINE: NORMAL
VITAMIN B-12: 332 PG/ML (ref 232–1245)
WBC # BLD: 10.2 K/UL (ref 3.5–11)
WBC # BLD: ABNORMAL 10*3/UL

## 2019-11-26 PROCEDURE — 82607 VITAMIN B-12: CPT

## 2019-11-26 PROCEDURE — 82746 ASSAY OF FOLIC ACID SERUM: CPT

## 2019-11-26 PROCEDURE — 85025 COMPLETE CBC W/AUTO DIFF WBC: CPT

## 2019-11-26 PROCEDURE — 81003 URINALYSIS AUTO W/O SCOPE: CPT

## 2019-11-26 PROCEDURE — G0103 PSA SCREENING: HCPCS

## 2019-11-26 PROCEDURE — 84156 ASSAY OF PROTEIN URINE: CPT

## 2019-11-26 PROCEDURE — 83550 IRON BINDING TEST: CPT

## 2019-11-26 PROCEDURE — 84166 PROTEIN E-PHORESIS/URINE/CSF: CPT

## 2019-11-26 PROCEDURE — 83880 ASSAY OF NATRIURETIC PEPTIDE: CPT

## 2019-11-26 PROCEDURE — 83540 ASSAY OF IRON: CPT

## 2019-11-26 PROCEDURE — 84165 PROTEIN E-PHORESIS SERUM: CPT

## 2019-11-26 PROCEDURE — 36415 COLL VENOUS BLD VENIPUNCTURE: CPT

## 2019-11-26 PROCEDURE — 80053 COMPREHEN METABOLIC PANEL: CPT

## 2019-11-26 PROCEDURE — 84155 ASSAY OF PROTEIN SERUM: CPT

## 2019-11-27 ENCOUNTER — TELEPHONE (OUTPATIENT)
Dept: FAMILY MEDICINE CLINIC | Age: 54
End: 2019-11-27

## 2019-11-27 DIAGNOSIS — J20.9 ACUTE BRONCHITIS DUE TO INFECTION: Primary | ICD-10-CM

## 2019-11-27 DIAGNOSIS — E53.8 VITAMIN B 12 DEFICIENCY: Primary | ICD-10-CM

## 2019-11-27 RX ORDER — BENZONATATE 100 MG/1
100 CAPSULE ORAL 3 TIMES DAILY PRN
Qty: 21 CAPSULE | Refills: 0 | Status: SHIPPED | OUTPATIENT
Start: 2019-11-27 | End: 2019-12-04

## 2019-11-27 RX ORDER — AZITHROMYCIN 250 MG/1
TABLET, FILM COATED ORAL
Qty: 6 TABLET | Refills: 0 | Status: SHIPPED | OUTPATIENT
Start: 2019-11-27 | End: 2019-12-02

## 2019-11-29 ENCOUNTER — PATIENT MESSAGE (OUTPATIENT)
Dept: FAMILY MEDICINE CLINIC | Age: 54
End: 2019-11-29

## 2019-11-29 DIAGNOSIS — Z87.891 PERSONAL HISTORY OF SMOKING: ICD-10-CM

## 2019-11-29 DIAGNOSIS — J44.9 MIXED TYPE COPD (CHRONIC OBSTRUCTIVE PULMONARY DISEASE) (HCC): Primary | ICD-10-CM

## 2019-11-29 RX ORDER — VARENICLINE TARTRATE 1 MG/1
1 TABLET, FILM COATED ORAL 2 TIMES DAILY
Qty: 60 TABLET | Refills: 3 | Status: SHIPPED | OUTPATIENT
Start: 2019-11-29 | End: 2020-01-02 | Stop reason: SDUPTHER

## 2019-11-29 RX ORDER — VARENICLINE TARTRATE 25 MG
KIT ORAL
Qty: 1 BOX | Refills: 0 | Status: SHIPPED | OUTPATIENT
Start: 2019-11-29 | End: 2020-01-02 | Stop reason: SDUPTHER

## 2019-12-02 ENCOUNTER — PATIENT MESSAGE (OUTPATIENT)
Dept: FAMILY MEDICINE CLINIC | Age: 54
End: 2019-12-02

## 2019-12-02 DIAGNOSIS — D50.0 IRON DEFICIENCY ANEMIA DUE TO CHRONIC BLOOD LOSS: ICD-10-CM

## 2019-12-02 LAB
ALBUMIN (CALCULATED): 4.6 G/DL (ref 3.2–5.2)
ALBUMIN PERCENT: 61 % (ref 45–65)
ALPHA 1 PERCENT: 3 % (ref 3–6)
ALPHA 2 PERCENT: 12 % (ref 6–13)
ALPHA-1-GLOBULIN: 0.2 G/DL (ref 0.1–0.4)
ALPHA-2-GLOBULIN: 0.9 G/DL (ref 0.5–0.9)
BETA GLOBULIN: 0.9 G/DL (ref 0.5–1.1)
BETA PERCENT: 11 % (ref 11–19)
GAMMA GLOBULIN %: 13 % (ref 9–20)
GAMMA GLOBULIN: 1 G/DL (ref 0.5–1.5)
PATHOLOGIST: NORMAL
PROTEIN ELECTROPHORESIS, SERUM: NORMAL
TOTAL PROT. SUM,%: 100 % (ref 98–102)
TOTAL PROT. SUM: 7.6 G/DL (ref 6.3–8.2)
TOTAL PROTEIN: 7.5 G/DL (ref 6.4–8.3)

## 2019-12-02 RX ORDER — PNV NO.95/FERROUS FUM/FOLIC AC 28MG-0.8MG
1 TABLET ORAL DAILY
Qty: 90 TABLET | Refills: 3 | Status: SHIPPED | OUTPATIENT
Start: 2019-12-02 | End: 2020-11-25 | Stop reason: SDUPTHER

## 2019-12-03 ENCOUNTER — NURSE ONLY (OUTPATIENT)
Dept: FAMILY MEDICINE CLINIC | Age: 54
End: 2019-12-03
Payer: COMMERCIAL

## 2019-12-03 ENCOUNTER — APPOINTMENT (OUTPATIENT)
Dept: GENERAL RADIOLOGY | Age: 54
End: 2019-12-03
Payer: COMMERCIAL

## 2019-12-03 ENCOUNTER — HOSPITAL ENCOUNTER (EMERGENCY)
Age: 54
Discharge: HOME | End: 2019-12-03
Attending: EMERGENCY MEDICINE
Payer: COMMERCIAL

## 2019-12-03 VITALS
TEMPERATURE: 98 F | SYSTOLIC BLOOD PRESSURE: 104 MMHG | OXYGEN SATURATION: 95 % | HEART RATE: 87 BPM | RESPIRATION RATE: 18 BRPM | BODY MASS INDEX: 42.66 KG/M2 | HEIGHT: 72 IN | WEIGHT: 315 LBS | DIASTOLIC BLOOD PRESSURE: 52 MMHG

## 2019-12-03 DIAGNOSIS — Z23 NEED FOR HEPATITIS B VACCINATION: Primary | ICD-10-CM

## 2019-12-03 DIAGNOSIS — S93.602A SPRAIN OF LEFT FOOT, INITIAL ENCOUNTER: Primary | ICD-10-CM

## 2019-12-03 DIAGNOSIS — S93.402A SPRAIN OF LEFT ANKLE, UNSPECIFIED LIGAMENT, INITIAL ENCOUNTER: ICD-10-CM

## 2019-12-03 LAB
P E INTERPRETATION, U: NORMAL
PATHOLOGIST: NORMAL
SPECIMEN TYPE: NORMAL
URINE TOTAL PROTEIN: 7 MG/DL

## 2019-12-03 PROCEDURE — 73610 X-RAY EXAM OF ANKLE: CPT

## 2019-12-03 PROCEDURE — 99283 EMERGENCY DEPT VISIT LOW MDM: CPT

## 2019-12-03 PROCEDURE — 6370000000 HC RX 637 (ALT 250 FOR IP): Performed by: EMERGENCY MEDICINE

## 2019-12-03 PROCEDURE — 73630 X-RAY EXAM OF FOOT: CPT

## 2019-12-03 RX ORDER — OXYCODONE HYDROCHLORIDE AND ACETAMINOPHEN 5; 325 MG/1; MG/1
1 TABLET ORAL ONCE
Status: COMPLETED | OUTPATIENT
Start: 2019-12-03 | End: 2019-12-03

## 2019-12-03 RX ADMIN — OXYCODONE HYDROCHLORIDE AND ACETAMINOPHEN 1 TABLET: 5; 325 TABLET ORAL at 09:37

## 2019-12-03 ASSESSMENT — ENCOUNTER SYMPTOMS
ABDOMINAL PAIN: 0
VOMITING: 0
DIARRHEA: 0
COUGH: 0
CONSTIPATION: 0
SHORTNESS OF BREATH: 0
NAUSEA: 0

## 2019-12-03 ASSESSMENT — PAIN SCALES - GENERAL
PAINLEVEL_OUTOF10: 8
PAINLEVEL_OUTOF10: 8

## 2019-12-04 ENCOUNTER — TELEPHONE (OUTPATIENT)
Dept: FAMILY MEDICINE CLINIC | Age: 54
End: 2019-12-04

## 2019-12-04 PROCEDURE — 90746 HEPB VACCINE 3 DOSE ADULT IM: CPT | Performed by: FAMILY MEDICINE

## 2019-12-04 PROCEDURE — G0010 ADMIN HEPATITIS B VACCINE: HCPCS | Performed by: FAMILY MEDICINE

## 2019-12-05 ENCOUNTER — OFFICE VISIT (OUTPATIENT)
Dept: FAMILY MEDICINE CLINIC | Age: 54
End: 2019-12-05
Payer: COMMERCIAL

## 2019-12-05 ENCOUNTER — TELEPHONE (OUTPATIENT)
Dept: OTHER | Age: 54
End: 2019-12-05

## 2019-12-05 ENCOUNTER — TELEPHONE (OUTPATIENT)
Dept: FAMILY MEDICINE CLINIC | Age: 54
End: 2019-12-05

## 2019-12-05 VITALS
TEMPERATURE: 98.3 F | RESPIRATION RATE: 18 BRPM | OXYGEN SATURATION: 94 % | SYSTOLIC BLOOD PRESSURE: 128 MMHG | HEIGHT: 72 IN | BODY MASS INDEX: 42.66 KG/M2 | DIASTOLIC BLOOD PRESSURE: 68 MMHG | HEART RATE: 83 BPM | WEIGHT: 315 LBS

## 2019-12-05 DIAGNOSIS — B35.1 ONYCHOMYCOSIS OF TOENAIL: ICD-10-CM

## 2019-12-05 DIAGNOSIS — F41.9 ANXIETY: ICD-10-CM

## 2019-12-05 DIAGNOSIS — S93.492D SPRAIN OF OTHER LIGAMENT OF LEFT ANKLE, SUBSEQUENT ENCOUNTER: ICD-10-CM

## 2019-12-05 DIAGNOSIS — Z91.81 AT RISK FOR FALLS: ICD-10-CM

## 2019-12-05 DIAGNOSIS — Z74.09 MOBILITY IMPAIRED: ICD-10-CM

## 2019-12-05 DIAGNOSIS — S93.602D FOOT SPRAIN, LEFT, SUBSEQUENT ENCOUNTER: Primary | ICD-10-CM

## 2019-12-05 DIAGNOSIS — E66.01 CLASS 3 SEVERE OBESITY DUE TO EXCESS CALORIES WITH SERIOUS COMORBIDITY AND BODY MASS INDEX (BMI) OF 50.0 TO 59.9 IN ADULT (HCC): ICD-10-CM

## 2019-12-05 DIAGNOSIS — M51.36 DDD (DEGENERATIVE DISC DISEASE), LUMBAR: ICD-10-CM

## 2019-12-05 PROCEDURE — G8427 DOCREV CUR MEDS BY ELIG CLIN: HCPCS | Performed by: FAMILY MEDICINE

## 2019-12-05 PROCEDURE — 99213 OFFICE O/P EST LOW 20 MIN: CPT | Performed by: FAMILY MEDICINE

## 2019-12-05 PROCEDURE — 4004F PT TOBACCO SCREEN RCVD TLK: CPT | Performed by: FAMILY MEDICINE

## 2019-12-05 PROCEDURE — 3017F COLORECTAL CA SCREEN DOC REV: CPT | Performed by: FAMILY MEDICINE

## 2019-12-05 PROCEDURE — G8417 CALC BMI ABV UP PARAM F/U: HCPCS | Performed by: FAMILY MEDICINE

## 2019-12-05 PROCEDURE — G8598 ASA/ANTIPLAT THER USED: HCPCS | Performed by: FAMILY MEDICINE

## 2019-12-05 PROCEDURE — G8482 FLU IMMUNIZE ORDER/ADMIN: HCPCS | Performed by: FAMILY MEDICINE

## 2019-12-05 RX ORDER — FENOFIBRATE 48 MG/1
48 TABLET, COATED ORAL DAILY
COMMUNITY
End: 2020-02-13 | Stop reason: ALTCHOICE

## 2019-12-05 ASSESSMENT — ENCOUNTER SYMPTOMS
DIARRHEA: 0
NAUSEA: 0
SORE THROAT: 0
VOMITING: 0
RESPIRATORY NEGATIVE: 1
CHEST TIGHTNESS: 0
ABDOMINAL PAIN: 0
COUGH: 0
TROUBLE SWALLOWING: 0
CONSTIPATION: 0
SHORTNESS OF BREATH: 0
EYE PAIN: 0
APNEA: 0

## 2019-12-09 ENCOUNTER — PATIENT MESSAGE (OUTPATIENT)
Dept: FAMILY MEDICINE CLINIC | Age: 54
End: 2019-12-09

## 2019-12-09 ENCOUNTER — TELEPHONE (OUTPATIENT)
Dept: FAMILY MEDICINE CLINIC | Age: 54
End: 2019-12-09

## 2019-12-10 ASSESSMENT — ENCOUNTER SYMPTOMS
COLOR CHANGE: 1
BACK PAIN: 1

## 2019-12-18 ENCOUNTER — PATIENT MESSAGE (OUTPATIENT)
Dept: FAMILY MEDICINE CLINIC | Age: 54
End: 2019-12-18

## 2019-12-18 DIAGNOSIS — G89.29 CHRONIC MIDLINE LOW BACK PAIN WITH LEFT-SIDED SCIATICA: Primary | ICD-10-CM

## 2019-12-18 DIAGNOSIS — M54.42 CHRONIC MIDLINE LOW BACK PAIN WITH LEFT-SIDED SCIATICA: Primary | ICD-10-CM

## 2019-12-18 DIAGNOSIS — M48.061 SPINAL STENOSIS OF LUMBAR REGION WITHOUT NEUROGENIC CLAUDICATION: ICD-10-CM

## 2019-12-18 DIAGNOSIS — M51.36 DDD (DEGENERATIVE DISC DISEASE), LUMBAR: ICD-10-CM

## 2019-12-18 RX ORDER — OXYCODONE HYDROCHLORIDE 10 MG/1
10 TABLET ORAL EVERY 8 HOURS PRN
Qty: 90 TABLET | Refills: 0 | Status: SHIPPED | OUTPATIENT
Start: 2019-12-18 | End: 2020-01-15 | Stop reason: SDUPTHER

## 2020-01-02 ENCOUNTER — PATIENT MESSAGE (OUTPATIENT)
Dept: FAMILY MEDICINE CLINIC | Age: 55
End: 2020-01-02

## 2020-01-02 RX ORDER — VARENICLINE TARTRATE 1 MG/1
1 TABLET, FILM COATED ORAL 2 TIMES DAILY
Qty: 60 TABLET | Refills: 3 | Status: SHIPPED | OUTPATIENT
Start: 2020-01-02 | End: 2020-02-13 | Stop reason: ALTCHOICE

## 2020-01-02 RX ORDER — VARENICLINE TARTRATE 25 MG
KIT ORAL
Qty: 1 BOX | Refills: 0 | Status: SHIPPED | OUTPATIENT
Start: 2020-01-02 | End: 2020-02-13 | Stop reason: ALTCHOICE

## 2020-01-07 ENCOUNTER — ANESTHESIA EVENT (OUTPATIENT)
Dept: OPERATING ROOM | Age: 55
End: 2020-01-07
Payer: COMMERCIAL

## 2020-01-07 ENCOUNTER — ANESTHESIA (OUTPATIENT)
Dept: OPERATING ROOM | Age: 55
End: 2020-01-07
Payer: COMMERCIAL

## 2020-01-07 ENCOUNTER — HOSPITAL ENCOUNTER (OUTPATIENT)
Age: 55
Setting detail: OUTPATIENT SURGERY
Discharge: HOME | End: 2020-01-07
Attending: OPHTHALMOLOGY | Admitting: OPHTHALMOLOGY
Payer: COMMERCIAL

## 2020-01-07 VITALS
RESPIRATION RATE: 18 BRPM | OXYGEN SATURATION: 100 % | SYSTOLIC BLOOD PRESSURE: 142 MMHG | DIASTOLIC BLOOD PRESSURE: 61 MMHG

## 2020-01-07 VITALS
TEMPERATURE: 98 F | RESPIRATION RATE: 18 BRPM | BODY MASS INDEX: 42.66 KG/M2 | HEART RATE: 78 BPM | OXYGEN SATURATION: 93 % | WEIGHT: 315 LBS | DIASTOLIC BLOOD PRESSURE: 68 MMHG | SYSTOLIC BLOOD PRESSURE: 169 MMHG | HEIGHT: 72 IN

## 2020-01-07 PROBLEM — H25.12 AGE-RELATED NUCLEAR CATARACT, LEFT: Status: RESOLVED | Noted: 2020-01-07 | Resolved: 2020-01-07

## 2020-01-07 LAB
GLUCOSE BLD-MCNC: 142 MG/DL (ref 75–110)
GLUCOSE BLD-MCNC: 156 MG/DL (ref 75–110)

## 2020-01-07 PROCEDURE — 6360000002 HC RX W HCPCS: Performed by: NURSE ANESTHETIST, CERTIFIED REGISTERED

## 2020-01-07 PROCEDURE — 2580000003 HC RX 258: Performed by: OPHTHALMOLOGY

## 2020-01-07 PROCEDURE — 6370000000 HC RX 637 (ALT 250 FOR IP): Performed by: OPHTHALMOLOGY

## 2020-01-07 PROCEDURE — 6360000002 HC RX W HCPCS: Performed by: OPHTHALMOLOGY

## 2020-01-07 PROCEDURE — 7100000001 HC PACU RECOVERY - ADDTL 15 MIN: Performed by: OPHTHALMOLOGY

## 2020-01-07 PROCEDURE — 2709999900 HC NON-CHARGEABLE SUPPLY: Performed by: OPHTHALMOLOGY

## 2020-01-07 PROCEDURE — 3600000002 HC SURGERY LEVEL 2 BASE: Performed by: OPHTHALMOLOGY

## 2020-01-07 PROCEDURE — 2500000003 HC RX 250 WO HCPCS: Performed by: OPHTHALMOLOGY

## 2020-01-07 PROCEDURE — V2632 POST CHMBR INTRAOCULAR LENS: HCPCS | Performed by: OPHTHALMOLOGY

## 2020-01-07 PROCEDURE — 7100000000 HC PACU RECOVERY - FIRST 15 MIN: Performed by: OPHTHALMOLOGY

## 2020-01-07 PROCEDURE — 82947 ASSAY GLUCOSE BLOOD QUANT: CPT

## 2020-01-07 PROCEDURE — 3700000000 HC ANESTHESIA ATTENDED CARE: Performed by: OPHTHALMOLOGY

## 2020-01-07 PROCEDURE — 3600000012 HC SURGERY LEVEL 2 ADDTL 15MIN: Performed by: OPHTHALMOLOGY

## 2020-01-07 PROCEDURE — 3700000001 HC ADD 15 MINUTES (ANESTHESIA): Performed by: OPHTHALMOLOGY

## 2020-01-07 DEVICE — LENS INTOCU +18.0 DIOPT L13MM DIA6MM 0DEG HAPTIC ANG A: Type: IMPLANTABLE DEVICE | Site: EYE | Status: FUNCTIONAL

## 2020-01-07 RX ORDER — SODIUM CHLORIDE, SODIUM LACTATE, POTASSIUM CHLORIDE, CALCIUM CHLORIDE 600; 310; 30; 20 MG/100ML; MG/100ML; MG/100ML; MG/100ML
INJECTION, SOLUTION INTRAVENOUS CONTINUOUS
Status: DISCONTINUED | OUTPATIENT
Start: 2020-01-07 | End: 2020-01-07 | Stop reason: HOSPADM

## 2020-01-07 RX ORDER — LIDOCAINE HYDROCHLORIDE 10 MG/ML
INJECTION, SOLUTION INFILTRATION; PERINEURAL PRN
Status: DISCONTINUED | OUTPATIENT
Start: 2020-01-07 | End: 2020-01-07 | Stop reason: ALTCHOICE

## 2020-01-07 RX ORDER — PHENYLEPHRINE HCL 2.5 %
1 DROPS OPHTHALMIC (EYE) EVERY 5 MIN PRN
Status: COMPLETED | OUTPATIENT
Start: 2020-01-07 | End: 2020-01-07

## 2020-01-07 RX ORDER — TIMOLOL MALEATE 5 MG/ML
SOLUTION/ DROPS OPHTHALMIC PRN
Status: DISCONTINUED | OUTPATIENT
Start: 2020-01-07 | End: 2020-01-07 | Stop reason: ALTCHOICE

## 2020-01-07 RX ORDER — TOBRAMYCIN 3 MG/ML
1 SOLUTION/ DROPS OPHTHALMIC EVERY 5 MIN PRN
Status: DISCONTINUED | OUTPATIENT
Start: 2020-01-07 | End: 2020-01-07 | Stop reason: HOSPADM

## 2020-01-07 RX ORDER — BALANCED SALT SOLUTION ENRICHED WITH BICARBONATE, DEXTROSE, AND GLUTATHIONE
KIT INTRAOCULAR PRN
Status: DISCONTINUED | OUTPATIENT
Start: 2020-01-07 | End: 2020-01-07 | Stop reason: ALTCHOICE

## 2020-01-07 RX ORDER — TOBRAMYCIN 3 MG/ML
SOLUTION/ DROPS OPHTHALMIC PRN
Status: DISCONTINUED | OUTPATIENT
Start: 2020-01-07 | End: 2020-01-07 | Stop reason: ALTCHOICE

## 2020-01-07 RX ORDER — MIDAZOLAM HYDROCHLORIDE 1 MG/ML
INJECTION INTRAMUSCULAR; INTRAVENOUS PRN
Status: DISCONTINUED | OUTPATIENT
Start: 2020-01-07 | End: 2020-01-07 | Stop reason: SDUPTHER

## 2020-01-07 RX ORDER — BUPIVACAINE HYDROCHLORIDE 5 MG/ML
INJECTION, SOLUTION EPIDURAL; INTRACAUDAL PRN
Status: DISCONTINUED | OUTPATIENT
Start: 2020-01-07 | End: 2020-01-07 | Stop reason: ALTCHOICE

## 2020-01-07 RX ORDER — KETOROLAC TROMETHAMINE 5 MG/ML
1 SOLUTION OPHTHALMIC EVERY 5 MIN PRN
Status: COMPLETED | OUTPATIENT
Start: 2020-01-07 | End: 2020-01-07

## 2020-01-07 RX ORDER — CYCLOPENTOLATE HYDROCHLORIDE 10 MG/ML
1 SOLUTION/ DROPS OPHTHALMIC EVERY 5 MIN PRN
Status: COMPLETED | OUTPATIENT
Start: 2020-01-07 | End: 2020-01-07

## 2020-01-07 RX ORDER — BUPIVACAINE HYDROCHLORIDE 5 MG/ML
1 INJECTION, SOLUTION EPIDURAL; INTRACAUDAL SEE ADMIN INSTRUCTIONS
Status: DISCONTINUED | OUTPATIENT
Start: 2020-01-07 | End: 2020-01-07 | Stop reason: HOSPADM

## 2020-01-07 RX ADMIN — BUPIVACAINE HYDROCHLORIDE 5 MG: 5 INJECTION, SOLUTION EPIDURAL; INTRACAUDAL; PERINEURAL at 08:38

## 2020-01-07 RX ADMIN — PHENYLEPHRINE HYDROCHLORIDE 1 DROP: 25 SOLUTION/ DROPS OPHTHALMIC at 08:22

## 2020-01-07 RX ADMIN — CYCLOPENTOLATE HYDROCHLORIDE 1 DROP: 10 SOLUTION/ DROPS OPHTHALMIC at 08:22

## 2020-01-07 RX ADMIN — CYCLOPENTOLATE HYDROCHLORIDE 1 DROP: 10 SOLUTION/ DROPS OPHTHALMIC at 08:37

## 2020-01-07 RX ADMIN — TOBRAMYCIN 1 DROP: 3 SOLUTION/ DROPS OPHTHALMIC at 08:22

## 2020-01-07 RX ADMIN — KETOROLAC TROMETHAMINE 1 DROP: 5 SOLUTION OPHTHALMIC at 08:38

## 2020-01-07 RX ADMIN — KETOROLAC TROMETHAMINE 1 DROP: 5 SOLUTION OPHTHALMIC at 08:22

## 2020-01-07 RX ADMIN — PHENYLEPHRINE HYDROCHLORIDE 1 DROP: 25 SOLUTION/ DROPS OPHTHALMIC at 08:37

## 2020-01-07 RX ADMIN — BUPIVACAINE HYDROCHLORIDE 5 MG: 5 INJECTION, SOLUTION EPIDURAL; INTRACAUDAL; PERINEURAL at 08:22

## 2020-01-07 RX ADMIN — SODIUM CHLORIDE, POTASSIUM CHLORIDE, SODIUM LACTATE AND CALCIUM CHLORIDE: 600; 310; 30; 20 INJECTION, SOLUTION INTRAVENOUS at 08:37

## 2020-01-07 RX ADMIN — TOBRAMYCIN 1 DROP: 3 SOLUTION/ DROPS OPHTHALMIC at 08:38

## 2020-01-07 RX ADMIN — MIDAZOLAM 2 MG: 1 INJECTION INTRAMUSCULAR; INTRAVENOUS at 09:41

## 2020-01-07 ASSESSMENT — PAIN - FUNCTIONAL ASSESSMENT: PAIN_FUNCTIONAL_ASSESSMENT: 0-10

## 2020-01-07 ASSESSMENT — COPD QUESTIONNAIRES: CAT_SEVERITY: NO INTERVAL CHANGE

## 2020-01-07 ASSESSMENT — PAIN SCALES - GENERAL
PAINLEVEL_OUTOF10: 8
PAINLEVEL_OUTOF10: 0
PAINLEVEL_OUTOF10: 8

## 2020-01-07 ASSESSMENT — PULMONARY FUNCTION TESTS
PIF_VALUE: 0
PIF_VALUE: 1
PIF_VALUE: 0

## 2020-01-07 ASSESSMENT — ENCOUNTER SYMPTOMS: SHORTNESS OF BREATH: 1

## 2020-01-07 NOTE — H&P
Hyperlipidemia     Hypersomnia     Hypertension     Insomnia     Intolerance of continuous positive airway pressure (CPAP) ventilation 7/20/2017    Mastoiditis of left side     Mixed conductive and sensorineural hearing loss of both ears 1/10/2017    Per ENT    Mixed type COPD (chronic obstructive pulmonary disease) ([address]     Moderate recurrent major depression ([address] 10/2/2016    Morbid obesity with BMI of 45.0-49.9, adult ([address] 6/16/2015    Neuropathy     Open wound of groin 12/19/2018    BARYB on CPAP     BARBY treated with BiPAP     Osteoarthritis     Otitis externa of left ear     Pancreatitis chronic     Renal insufficiency     proteinuria    S/P cardiac cath 04/23/2018    Severe depression ([address] 9/25/2013    Spinal stenosis of lumbar region without neurogenic claudication 1/6/2016    MRI lumbar 12/30/15 L3-L4: There is broad-based bulging disc which appears protruding left laterally causing flattening of the ventral thecal sac. In addition, there is facet arthropathy with mild hypertrophic changes.  There is borderline central canal stenosis with  evidence of moderate left neural foraminal narrowing and mild right neural foramina narrowing.   L4-L5: There is broad-based protrud    Syncope 4/28/2017    Tinnitus of both ears 1/10/2017    Per ENT    Type 2 diabetes mellitus with stage 3 chronic kidney disease, with long-term current use of insulin ([address] 12/26/2016    due to underlying condition with hyperosmolarity without coma    Type II or unspecified type diabetes mellitus without mention of complication, not stated as uncontrolled     uncontrolled    Wears glasses     for reading    Wound of left leg 2/22/2019       Past Surgical History:   Procedure Laterality Date    BACK SURGERY   (x 4) 2000,.12/2011.2/2012     Dr Claudette Daily last 2 surg    CARDIAC CATHETERIZATION  04/23/2018    no stenting    CATARACT REMOVAL WITH IMPLANT Right 11/05/2019    [facility]    COLONOSCOPY 11/3/2015    hemorrhoids, poor prep    CORONARY ANGIOPLASTY WITH STENT PLACEMENT  March 2013    x 1    HAND TENDON SURGERY Left     thumb tendon repair    INTRACAPSULAR CATARACT EXTRACTION Right 11/5/2019    EYE CATARACT EMULSIFICATION IOL IMPLANT performed by Guillermina Shell MD at [address] ARTHROSCOPY Left     NERVE BLOCK  07-31-15    TENS unit    MA ESOPHAGOGASTRODUODENOSCOPY TRANSORAL DIAGNOSTIC N/A 7/18/2018    EGD ESOPHAGOGASTRODUODENOSCOPY performed by Susan Turner MD at [address] [facility] Bilateral 09/20/2012    Dr Tae Horner  4/13/2013    normal         Current Facility-Administered Medications:     lactated ringers infusion, , Intravenous, Continuous, Guillermina Shell MD, Last Rate: 50 mL/hr at 01/07/20 0837    tobramycin (TOBREX) 0.3 % ophthalmic solution 1 drop, 1 drop, Left Eye, Q5 Min PRN, Guillermina Shell MD, 1 drop at 01/07/20 0838    bupivacaine (PF) (MARCAINE) 0.5 % injection 5 mg, 1 mL, Ophthalmic, See Admin Instructions, Guillermina Shell MD, 5 mg at 01/07/20 6394      Allergies   Allergen Reactions    Levofloxacin Anaphylaxis     Patient reports needing epinephrine \"about 5 or 6 months ago\" for anaphylaxis (itching, hives, SOB/swelling) after receiving Levofloxacin. Previous report from 08/20/2012: Nausea/Vomiting    Lorazepam      Falls      Nsaids      CHF&CKD    Prozac [Fluoxetine Hcl] Other (See Comments)     Pt started with seizures after started taking.  Vancomycin Other (See Comments)     Itching, SOB, emesis upon infusion in ED 6/12/2019. Patient states he has had vancomycin \"a number of times\" before without issue. couldn't breath and talk, throat closed         PHYSICAL EXAMINATION    Vitals:    01/07/20 0808   BP: (!) 163/51   Pulse: 79   Resp: 20   Temp: 97.9 °F (36.6 °C)   SpO2: 98%       Gen: NAD  HEENT: Visually significant cataract   Pulm: CTA Bilaterally  Heart: RRR, no murmurs  Abd: soft, non-tender  Ext: no edema  Neuro: no focal deficits    Assessment:   1. Visually significant cataract   2. See preoperative ophthalmology notes    Plan:   1. Risks, benefits, alternatives to surgery discussed with the patient and family. 2. All questions were answered to their satisfaction. 3. Ok to proceed with surgery as planned.     Olena Michelle

## 2020-01-07 NOTE — ANESTHESIA PRE PROCEDURE
Department of Anesthesiology  Preprocedure Note       Name:  Vanessa Sanchez. Age:  53 y.o.  :  1965                                          MRN:  [id number]         Date:  2020      Surgeon: Nicola Brewer):  Lotus Peguero MD    Procedure: EYE CATARACT EMULSIFICATION IOL IMPLANT (Left Eye)    Medications prior to admission:   Prior to Admission medications    Medication Sig Start Date End Date Taking? Authorizing Provider   varenicline (CHANTIX STARTING MONTH ) 0.5 MG X 11 & 1 MG X 42 tablet 0.5 mg po daily x 3 days, 0.5 mg BID x 4 days, then 1 mg BID thereafter . Call for refill 20   Zhen Engel MD   varenicline (CHANTIX CONTINUING MONTH ) 1 MG tablet Take 1 tablet by mouth 2 times daily 20   Zhen Engel MD   oxyCODONE HCl (OXY-IR) 10 MG immediate release tablet Take 1 tablet by mouth every 8 hours as needed for Pain for up to 30 days. 19  Zhen Engel MD   fenofibrate (TRICOR) 48 MG tablet Take 48 mg by mouth daily    Historical Provider, MD   diclofenac sodium (VOLTAREN) 1 % GEL Apply 2 g topically 2 times daily 19   Esperanza Lucia, APRN - CNP   Ferrous Sulfate (IRON) 325 (65 Fe) MG TABS Take 1 tablet by mouth daily 19   Zhen Engel MD   cyanocobalamin ([employer] VITAMIN B12) 1000 MCG tablet Take 1 tablet by mouth daily 19   Zhen Engel MD   naloxone 4 MG/0.1ML LIQD nasal spray 1 spray by Nasal route as needed for Opioid Reversal 19   Zhen Engel MD   ketoconazole (NIZORAL) 2 % cream Apply topically  2 times a day x 4-6 weeks, right palm 19   Zhen Engel MD   blood glucose test strips (ONE TOUCH ULTRA TEST) strip USE ONE STRIP TO TEST up to four times daily. 19   Stevenson Deshpande MD   ONE TOUCH ULTRASOFT LANCETS [address] [facility] Patient to test blood sugar up to 4 times daily. 19   Stevenson Deshpande MD   lisinopril (PRINIVIL;ZESTRIL) 5 MG tablet Take 1 tablet by mouth daily . Discontinued Lisinopril  10 mg 10/10/19   Shirley Oglesby MD   COMFORT ASSIST INSULIN SYRINGE 31G X 5/16\" 1 ML MISC Use to subcutaneously inject insulin three times daily 10/10/19   Shirley Oglesby MD   tiotropium (SPIRIVA RESPIMAT) 2.5 MCG/ACT AERS inhaler Inhale 2 puffs into the lungs daily 10/10/19   Shirley Oglesby MD   venlafaxine (EFFEXOR XR) 75 MG extended release capsule Take 1 capsule by mouth daily 10/10/19   Shirley Oglesby MD   bumetanide (BUMEX) 1 MG tablet Take 3 tablets by mouth 2 times daily  Patient taking differently: Take 3 mg by mouth daily  10/10/19   Shirley Oglesby MD   metoprolol tartrate (LOPRESSOR) 50 MG tablet Take 1 tablet by mouth 2 times daily 10/10/19   Shirley Oglesby MD   tiZANidine (ZANAFLEX) 4 MG tablet TAKE ONE TABLET BY MOUTH EVERY 8 HOURS AS NEEDED FOR BACK PAIN 10/10/19   Shirley Oglesby MD   clopidogrel (PLAVIX) 75 MG tablet Take 1 tablet by mouth daily 8/7/19   Shirley Oglesby MD   Misc. Devices (HIBICLENS HAND PUMP 32OZ) MISC For skin decontamination daily use, rinse well after the using it 8/7/19   Shirley Oglesby MD   nystatin (MYCOSTATIN) 667135 UNIT/GM powder Apply 2-3 times daily in skin folds. 6/19/19   Shirley Oglesby MD   albuterol (PROVENTIL) (2.5 MG/3ML) 0.083% nebulizer solution Take 3 mLs by nebulization every 6 hours as needed for Wheezing or Shortness of Breath 6/4/19   Shirley Oglesby MD   Lancet Devices (LANCING DEVICE) MISC Provide patient with lancing device appropriate for his machine/lancing needles.  6/4/19   Shirley Oglesby MD   pravastatin (PRAVACHOL) 40 MG tablet Take 1 tablet by mouth nightly 5/22/19   Shirley Oglesby MD   Lidocaine 4 % LOTN Apply topically    Historical Provider, MD   Spacer/Aero Chamber Mouthpiece MISC 1 each by Does not apply route once as needed (to be used with his inhalers) 4/15/19 8/30/19  Shirley Oglesby MD   metolazone (ZAROXOLYN) 2.5 MG tablet Take 2.5 mg by mouth three times a week current facility-administered medications for this visit. No current outpatient medications on file. Facility-Administered Medications Ordered in Other Visits   Medication Dose Route Frequency Provider Last Rate Last Dose    lactated ringers infusion   Intravenous Continuous Adiel Arevalo MD        phenylephrine (MYDFRIN) 2.5 % ophthalmic solution 1 drop  1 drop Left Eye Q5 Min PRN Adiel Arevalo MD        cyclopentolate (CYCLOGYL) 1 % ophthalmic solution 1 drop  1 drop Left Eye Q5 Min PRN Adiel Arevalo MD        tobramycin (TOBREX) 0.3 % ophthalmic solution 1 drop  1 drop Left Eye Q5 Min PRN Adiel Arevalo MD        ketorolac (ACULAR) 0.5 % ophthalmic solution 1 drop  1 drop Left Eye Q5 Min PRN Adiel Arevalo MD        bupivacaine (PF) (MARCAINE) 0.5 % injection 5 mg  1 mL Ophthalmic See Admin Instructions Adiel Arevalo MD           Allergies: Allergies   Allergen Reactions    Levofloxacin Anaphylaxis     Patient reports needing epinephrine \"about 5 or 6 months ago\" for anaphylaxis (itching, hives, SOB/swelling) after receiving Levofloxacin. Previous report from 08/20/2012: Nausea/Vomiting    Lorazepam      Falls      Nsaids      CHF&CKD    Prozac [Fluoxetine Hcl] Other (See Comments)     Pt started with seizures after started taking.  Vancomycin Other (See Comments)     Itching, SOB, emesis upon infusion in ED 6/12/2019. Patient states he has had vancomycin \"a number of times\" before without issue. couldn't breath and talk, throat closed       Problem List:    Patient Active Problem List   Diagnosis Code    DDD (degenerative disc disease), lumbar M51.36    Hypertriglyceridemia E78.1    CKD (chronic kidney disease) stage 3, GFR 30-59 ml/min ([facility]) N18.3    CAD (coronary artery disease) s/p 1 stent I25.10    Mixed type COPD (chronic obstructive pulmonary disease) ([facility] J44.9    Type 2 diabetes mellitus with diabetic polyneuropathy, with long-term current use of insulin (HCC) E11.42, Z79.4    Chronic pancreatitis ([facility] K86.1    Displacement of lumbar intervertebral disc without myelopathy M51.26    Chronic diastolic congestive heart failure (HCC) I50.32    Insomnia G47.00    BPH (benign prostatic hyperplasia) N40.0    Morbid obesity with BMI of 50.0-59.9, adult ([facility]) E66.01, Z68.43    Lower abdominal pain R10.30    Essential hypertension I10    Hepatic steatosis K76.0    History of rib fracture V48.09    Umbilical hernia M18.3    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    Spinal stenosis of lumbar region without neurogenic claudication M48.061    Chronic back pain greater than 3 months duration M54.9, G89.29    Gastroesophageal reflux disease without esophagitis K21.9    Hyperlipidemia with target LDL less than 70 E78.5    Lower urinary tract symptoms (LUTS) R39.9    Vitamin D deficiency E55.9    Iron deficiency anemia due to chronic blood loss D50.0    BARBY treated with BiPAP G47.33    Chronic midline low back pain with left-sided sciatica M54.42, G89.29    Stasis dermatitis of both legs I87.2    Anxiety F41.9    Intertrigo axillary b/l L30.4    History of recurrent ear infection Z86.69    Mixed conductive and sensorineural hearing loss of both ears H90.6    Tinnitus of both ears H93.13    Slow transit constipation K59.01    Chronic infection of both external ears H60.63    Tinea pedis of both feet B35.3    Onychomycosis B35.1    MDD (major depressive disorder), recurrent severe, without psychosis ([facility] [address] F33.2    Hydrocele, bilateral N43.3    Celiac artery stenosis ([facility]) I77.4    Myopia H52.10    Nuclear nonsenile cataract H26.9    Presbyopia H52.4    Postlaminectomy syndrome of lumbar region M96.1    Venous insufficiency of left lower extremity I87.2    Bilateral hearing loss H91.93    Seizures ([facility]) R56.9    Recurrent infection of skin L08.9    Persistent depressive disorder F34.1    Vitamin B 12 deficiency E53.8    Mobility impaired Z74.09 Past Medical History:        Diagnosis Date    Acute bronchitis with chronic obstructive pulmonary disease (COPD) ([address]     Acute on chronic kidney failure ([address] 7/20/2017    Acute on chronic respiratory failure ([address] 10/2/2018    Adhesive capsulitis of left shoulder 3/25/2017    Anxiety 10/2/2016    Arthropathy, unspecified, other specified sites 6/13/2013    Asthma     Bilateral lower leg cellulitis 2/17/2016    Blood in stool     CAD (coronary artery disease)     Cellulitis of both lower extremities 5/25/2017    Cellulitis of leg, left 07/20/2017    CHF (congestive heart failure), NYHA class III ([facility]) 8/14/2013    Chronic back pain     Chronic bronchitis ([facility])     Chronic headaches     was referred to neuro, testing scheduled    Chronic kidney disease     Chronic respiratory failure (HCC)     Chronic ulcer of left leg, with fat layer exposed ([address] 2/22/2019    Class 2 severe obesity due to excess calories with serious comorbidity and body mass index (BMI) of 35.0 to 35.9 in adult ([facility])     (BMI 35.0-39.9 without comorbidity)    COPD (chronic obstructive pulmonary disease) ([address]     COPD exacerbation ([address] 11/2/2016    Diabetic neuropathy ([address] 8/14/2013    Displacement of lumbar intervertebral disc without myelopathy 6/13/2013    Ear infection     RIGHT    Facial cellulitis 2012    Fall 3/25/2017    Former smoker     GERD (gastroesophageal reflux disease)     Head injury     Hearing loss in right ear     pencil pierced ear as a child    Hepatic steatosis 12/3/2015    History of general anesthesia complication     has woke up during surgery under anesthesia    History of rib fracture 12/3/2015    Chronic     Hyperlipidemia     Hypersomnia     Hypertension     Insomnia     Intolerance of continuous positive airway pressure (CPAP) ventilation 7/20/2017    Mastoiditis of left side     Mixed conductive and sensorineural hearing loss of both ears 1/10/2017    Per ENT    Mixed type COPD (chronic obstructive pulmonary disease) (HCC)     Moderate recurrent major depression ([address] 10/2/2016    Morbid obesity with BMI of 45.0-49.9, adult ([address] 6/16/2015    Neuropathy     Open wound of groin 12/19/2018    BARBY on CPAP     BARBY treated with BiPAP     Osteoarthritis     Otitis externa of left ear     Pancreatitis chronic     Renal insufficiency     proteinuria    S/P cardiac cath 04/23/2018    Severe depression ([address] 9/25/2013    Spinal stenosis of lumbar region without neurogenic claudication 1/6/2016    MRI lumbar 12/30/15 L3-L4: There is broad-based bulging disc which appears protruding left laterally causing flattening of the ventral thecal sac. In addition, there is facet arthropathy with mild hypertrophic changes.  There is borderline central canal stenosis with  evidence of moderate left neural foraminal narrowing and mild right neural foramina narrowing.   L4-L5: There is broad-based protrud    Syncope 4/28/2017    Tinnitus of both ears 1/10/2017    Per ENT    Type 2 diabetes mellitus with stage 3 chronic kidney disease, with long-term current use of insulin ([address] 12/26/2016    due to underlying condition with hyperosmolarity without coma    Type II or unspecified type diabetes mellitus without mention of complication, not stated as uncontrolled     uncontrolled    Wears glasses     for reading    Wound of left leg 2/22/2019       Past Surgical History:        Procedure Laterality Date    BACK SURGERY   (x 4) 2000,.12/2011.2/2012     Dr Micha Abebe last 2 surg    CARDIAC CATHETERIZATION  04/23/2018    no stenting    CATARACT REMOVAL WITH IMPLANT Right 11/05/2019    [facility]    COLONOSCOPY  11/3/2015    hemorrhoids, poor prep    CORONARY ANGIOPLASTY WITH STENT PLACEMENT  March 2013    x 1    HAND TENDON SURGERY Left     thumb tendon repair    INTRACAPSULAR CATARACT EXTRACTION Right 11/5/2019    EYE CATARACT EMULSIFICATION IOL IMPLANT performed by and risks discussed with patient. Plan discussed with CRNA.                   Annabel Locke MD   1/7/2020

## 2020-01-07 NOTE — ANESTHESIA POSTPROCEDURE EVALUATION
Department of Anesthesiology  Postprocedure Note    Patient: Faythe Sandifer. MRN: [id number]  YOB: 1965  Date of evaluation: 1/7/2020  Time:  10:31 AM     Procedure Summary     Date:  01/07/20 Room / Location:  [address] [place] [address] Mal[address] / [address]    Anesthesia Start:  7980 Anesthesia Stop:  0278    Procedure:  EYE CATARACT EMULSIFICATION IOL IMPLANT (Left Eye) Diagnosis:  (LEFT EYE CATARACT   (PAT PER ANES ON ADMIT))    Surgeon:  Erick Garg MD Responsible Provider:  Shay Wolfe MD    Anesthesia Type:  MAC ASA Status:  4          Anesthesia Type: MAC    Cristine Phase I: Cristine Score: 10    Cristine Phase II:      Last vitals: Reviewed and per EMR flowsheets. Anesthesia Post Evaluation    Comments: POST- ANESTHESIA EVALUATION       Pt Name: Faythe Sandifer.   MRN: [id number]  YOB: 1965  Date of evaluation: 1/7/2020  Time:  10:32 AM      BP (!) 155/78   Pulse 79   Temp 97.5 °F (36.4 °C) (Infrared)   Resp 16   Ht 6' (1.829 m)   Wt (!) 401 lb (181.9 kg)   SpO2 94%   BMI 54.39 kg/m²      Consciousness Level  Awake  Cardiopulmonary Status  Stable  Pain Adequately Treated YES  Nausea / Vomiting  NO  Adequate Hydration  YES  Anesthesia Related Complications NONE      Electronically signed by Shay Wolfe MD on 1/7/2020 at 10:32 AM

## 2020-01-09 ENCOUNTER — HOSPITAL ENCOUNTER (OUTPATIENT)
Dept: OTHER | Age: 55
Discharge: HOME | End: 2020-01-09
Payer: COMMERCIAL

## 2020-01-09 ENCOUNTER — TELEPHONE (OUTPATIENT)
Dept: FAMILY MEDICINE CLINIC | Age: 55
End: 2020-01-09

## 2020-01-09 ENCOUNTER — HOSPITAL ENCOUNTER (OUTPATIENT)
Dept: PHARMACY | Age: 55
Setting detail: THERAPIES SERIES
Discharge: HOME | End: 2020-01-09
Payer: COMMERCIAL

## 2020-01-09 VITALS
SYSTOLIC BLOOD PRESSURE: 116 MMHG | HEART RATE: 76 BPM | RESPIRATION RATE: 22 BRPM | OXYGEN SATURATION: 99 % | WEIGHT: 315 LBS | BODY MASS INDEX: 55.74 KG/M2 | DIASTOLIC BLOOD PRESSURE: 70 MMHG

## 2020-01-09 PROCEDURE — 99211 OFF/OP EST MAY X REQ PHY/QHP: CPT

## 2020-01-09 PROCEDURE — 99212 OFFICE O/P EST SF 10 MIN: CPT

## 2020-01-09 RX ORDER — CEPHALEXIN 500 MG/1
500 CAPSULE ORAL 4 TIMES DAILY
Qty: 40 CAPSULE | Refills: 0 | Status: SHIPPED | OUTPATIENT
Start: 2020-01-09 | End: 2020-02-13 | Stop reason: ALTCHOICE

## 2020-01-09 RX ORDER — DOXYCYCLINE HYCLATE 100 MG
100 TABLET ORAL 2 TIMES DAILY
Qty: 20 TABLET | Refills: 0 | Status: SHIPPED | OUTPATIENT
Start: 2020-01-09 | End: 2020-01-19

## 2020-01-09 RX ORDER — CHLORHEXIDINE GLUCONATE 4 G/100ML
SOLUTION TOPICAL
Qty: 946 ML | Refills: 3 | Status: SHIPPED | OUTPATIENT
Start: 2020-01-09 | End: 2020-02-13 | Stop reason: SDUPTHER

## 2020-01-09 NOTE — PROGRESS NOTES
medicaitons)   [x]  None   []  Diarrhea / Nausea / Vomiting / Constipation / flatulence   []  Hypertension   []  Peripheral edema     []  Signs of infection   []  Headache   []  Vision changes   []  Increased cholesterol    []  Weight gain   []  Change in renal function   []  Increased potassium  []  Other      Comments:  Patient takes Humulin R U500. Patient has been taking Humulin R U500 0.5ml with breakfast, 0.55ml with lunch and 0.4 with dinner. If recent hospital admission / ED visit, was this related to Diabetes No:Cellulitis.  Discharge date 6/12/19    Objective     PMHx:    Past Medical History:   Diagnosis Date    Acute bronchitis with chronic obstructive pulmonary disease (COPD) ([address]     Acute on chronic kidney failure ([address] 7/20/2017    Acute on chronic respiratory failure (HCC) 10/2/2018    Adhesive capsulitis of left shoulder 3/25/2017    Anxiety 10/2/2016    Arthropathy, unspecified, other specified sites 6/13/2013    Asthma     Bilateral lower leg cellulitis 2/17/2016    Blood in stool     CAD (coronary artery disease)     Cellulitis of both lower extremities 5/25/2017    Cellulitis of leg, left 07/20/2017    CHF (congestive heart failure), NYHA class III ([facility]) 8/14/2013    Chronic back pain     Chronic bronchitis ([facility])     Chronic headaches     was referred to neuro, testing scheduled    Chronic kidney disease     Chronic respiratory failure ([facility])     Chronic ulcer of left leg, with fat layer exposed ([address] 2/22/2019    Class 2 severe obesity due to excess calories with serious comorbidity and body mass index (BMI) of 35.0 to 35.9 in adult ([facility])     (BMI 35.0-39.9 without comorbidity)    COPD (chronic obstructive pulmonary disease) ([address]     COPD exacerbation ([address] 11/2/2016    Diabetic neuropathy ([address] 8/14/2013    Displacement of lumbar intervertebral disc without myelopathy 6/13/2013    Ear infection     RIGHT    Facial cellulitis 2012    Fall 3/25/2017    Former smoker     GERD (gastroesophageal reflux disease)     Head injury     Hearing loss in right ear     pencil pierced ear as a child    Hepatic steatosis 12/3/2015    History of general anesthesia complication     has woke up during surgery under anesthesia    History of rib fracture 12/3/2015    Chronic     Hyperlipidemia     Hypersomnia     Hypertension     Insomnia     Intolerance of continuous positive airway pressure (CPAP) ventilation 7/20/2017    Mastoiditis of left side     Mixed conductive and sensorineural hearing loss of both ears 1/10/2017    Per ENT    Mixed type COPD (chronic obstructive pulmonary disease) ([address]     Moderate recurrent major depression ([address] 10/2/2016    Morbid obesity with BMI of 45.0-49.9, adult ([address] 6/16/2015    Neuropathy     Open wound of groin 12/19/2018    BARBY on CPAP     BARBY treated with BiPAP     Osteoarthritis     Otitis externa of left ear     Pancreatitis chronic     Renal insufficiency     proteinuria    S/P cardiac cath 04/23/2018    Severe depression ([address] 9/25/2013    Spinal stenosis of lumbar region without neurogenic claudication 1/6/2016    MRI lumbar 12/30/15 L3-L4: There is broad-based bulging disc which appears protruding left laterally causing flattening of the ventral thecal sac. In addition, there is facet arthropathy with mild hypertrophic changes.  There is borderline central canal stenosis with  evidence of moderate left neural foraminal narrowing and mild right neural foramina narrowing.   L4-L5: There is broad-based protrud    Syncope 4/28/2017    Tinnitus of both ears 1/10/2017    Per ENT    Type 2 diabetes mellitus with stage 3 chronic kidney disease, with long-term current use of insulin ([address] 12/26/2016    due to underlying condition with hyperosmolarity without coma    Type II or unspecified type diabetes mellitus without mention of complication, not stated as uncontrolled     uncontrolled    Wears glasses     for reading  Wound of left leg 2/22/2019       Social History:    Social History     Tobacco Use    Smoking status: Former Smoker     Packs/day: 0.25     Years: 33.00     Pack years: 8.25     Types: Cigarettes     Start date: 6/22/1985     Last attempt to quit: 1/9/2020    Smokeless tobacco: Former User     Types: Snuff     Quit date: 6/22/1995   Substance Use Topics    Alcohol use: No     Alcohol/week: 0.0 standard drinks       Pertinent Labs:    Lab Results   Component Value Date    LABA1C 6.2 11/19/2019    LABA1C 6.1 08/07/2019    LABA1C 7.2 05/02/2019     Lab Results   Component Value Date    CHOL 139 09/12/2019    TRIG 247 (H) 09/12/2019    HDL 29 (L) 09/12/2019     Lab Results   Component Value Date    CREATININE 1.80 (H) 11/26/2019    BUN 43 (H) 11/26/2019     11/26/2019    K 4.9 11/26/2019    CL 97 (L) 11/26/2019    CO2 27 11/26/2019     Lab Results   Component Value Date    ALT 14 11/26/2019       Weight:  Wt Readings from Last 3 Encounters:   01/09/20 (!) 411 lb (186.4 kg)   01/07/20 (!) 401 lb (181.9 kg)   12/05/19 (!) 401 lb (181.9 kg)       Current medications:  Prior to Admission medications    Medication Sig Start Date End Date Taking? Authorizing Provider   varenicline (CHANTIX STARTING MONTH GUALBERTO) 0.5 MG X 11 & 1 MG X 42 tablet 0.5 mg po daily x 3 days, 0.5 mg BID x 4 days, then 1 mg BID thereafter . Call for refill 1/2/20  Yes Williams Martini MD   varenicline (CHANTIX CONTINUING MONTH GUALBERTO) 1 MG tablet Take 1 tablet by mouth 2 times daily 1/2/20  Yes Williams Martini MD   oxyCODONE HCl (OXY-IR) 10 MG immediate release tablet Take 1 tablet by mouth every 8 hours as needed for Pain for up to 30 days.  12/18/19 1/17/20 Yes Williams Martini MD   fenofibrate (TRICOR) 48 MG tablet Take 48 mg by mouth daily   Yes Historical Provider, MD   diclofenac sodium (VOLTAREN) 1 % GEL Apply 2 g topically 2 times daily 12/5/19  Yes Esperanza Lucia, APRN - CNP   Ferrous Sulfate (IRON) 325 (65 Fe) MG TABS Take Historical Provider, MD   nitroGLYCERIN (NITROSTAT) 0.4 MG SL tablet Place 1 tablet under the tongue every 5 minutes as needed for Chest pain 4/11/18  Yes Baudilio Sosa MD   fluticasone (CUTIVATE) 0.05 % cream Apply topically 2 times daily  4/19/17  Yes Historical Provider, MD   fluticasone (FLONASE) 50 MCG/ACT nasal spray 2 sprays by Nasal route daily  Patient taking differently: 2 sprays by Nasal route daily as needed (sinus symptoms)  1/16/17  Yes Baudilio Sosa MD   Melatonin 10 MG TABS Take 10 mg by mouth nightly as needed (insomnia) 12/23/16  Yes Baudilio Sosa MD   aspirin 81 MG EC tablet Take 81 mg by mouth daily. Yes Historical Provider, MD   Spacer/Aero Chamber Mouthpiece [address] [facility] 1 each by Does not apply route once as needed (to be used with his inhalers) 4/15/19 8/30/19  Baudilio Sosa MD       Immunizations:   Most Recent Immunizations   Administered Date(s) Administered    DT (pediatric) 12/14/1998    Hepatitis B Adult (Engerix-B) 12/04/2019    Influenza Virus Vaccine 10/12/2015    Influenza, Quadv, IM, PF (6 mo and older Fluzone, Flulaval, Fluarix, and 3 yrs and older Afluria) 10/04/2019    Pneumococcal Polysaccharide ([id number]) 05/23/2013    Tdap (Boostrix, Adacel) 09/12/2018       Home Blood Glucose Results:   Patient has blood glucose log with him today. Typical fasting (before breakfast) between 6:30/7am running  . Pre lunch between 12-3pm running 118-137. Pre dinner at 6-8pm has come down a bit from last visit at 133-151. Patient to continue monitoring blood sugar and noting level as well as how much insulin he takes each day. Blood glucose trends noted: see above    Assessment     A1c at goal:Yes : 6.2 (11/19/19)  Blood Pressure at goal: Yes: 116/70 today  Weight at goal: No: 411lbs   Physical activity: Patient unable to exercise currently but will try to incorporate   Physical activity at goal: No  Patient encouraged.   Smoking Cessation: Yes: but

## 2020-01-09 NOTE — TELEPHONE ENCOUNTER
Please contact the patient and ask where does he have the cellulitis left or right armpit? Please schedule him in a few days with me 1 2 or 3 days if possible, he will need a recheck of the cellulitis anyway, 15 minutes okay    I will send an antibiotic for him, I will send Keflex 4 times a day with doxycycline. Does he have chlorhexidine, I could also send that for the washing of the skin. Would he like some Macrobid?   Please let me know and I will send all the medications at once      Yesenia contacted me that he is developing cellulitis again, or foul-smelling odor and drainage from 1 of the armpit, will send antibiotics for him

## 2020-01-09 NOTE — TELEPHONE ENCOUNTER
Spoke with patient it is his left armpit and he does not have any chlorhexidine wash - I tried to schedule him an appointment you have no openings I offered him Erum he refused to see her.

## 2020-01-15 ENCOUNTER — PATIENT MESSAGE (OUTPATIENT)
Dept: FAMILY MEDICINE CLINIC | Age: 55
End: 2020-01-15

## 2020-01-15 RX ORDER — PANTOPRAZOLE SODIUM 40 MG/1
40 TABLET, DELAYED RELEASE ORAL NIGHTLY
Qty: 90 TABLET | Refills: 3 | Status: SHIPPED | OUTPATIENT
Start: 2020-01-15 | End: 2021-01-13 | Stop reason: SDUPTHER

## 2020-01-15 RX ORDER — DOCUSATE SODIUM 100 MG/1
100 CAPSULE, LIQUID FILLED ORAL 2 TIMES DAILY
Qty: 180 CAPSULE | Refills: 3 | Status: SHIPPED | OUTPATIENT
Start: 2020-01-15 | End: 2021-02-24 | Stop reason: SDUPTHER

## 2020-01-15 RX ORDER — OXYCODONE HYDROCHLORIDE 10 MG/1
10 TABLET ORAL EVERY 8 HOURS PRN
Qty: 90 TABLET | Refills: 0 | Status: SHIPPED | OUTPATIENT
Start: 2020-01-15 | End: 2020-02-13 | Stop reason: SDUPTHER

## 2020-02-03 RX ORDER — FLUTICASONE PROPIONATE AND SALMETEROL XINAFOATE 230; 21 UG/1; UG/1
AEROSOL, METERED RESPIRATORY (INHALATION)
Qty: 12 G | Refills: 7 | Status: SHIPPED | OUTPATIENT
Start: 2020-02-03 | End: 2020-10-19 | Stop reason: SDUPTHER

## 2020-02-06 ENCOUNTER — APPOINTMENT (OUTPATIENT)
Dept: PHARMACY | Age: 55
End: 2020-02-06
Payer: COMMERCIAL

## 2020-02-13 ENCOUNTER — PATIENT MESSAGE (OUTPATIENT)
Dept: FAMILY MEDICINE CLINIC | Age: 55
End: 2020-02-13

## 2020-02-13 ENCOUNTER — HOSPITAL ENCOUNTER (OUTPATIENT)
Dept: PHARMACY | Age: 55
Setting detail: THERAPIES SERIES
Discharge: HOME | End: 2020-02-13
Payer: COMMERCIAL

## 2020-02-13 ENCOUNTER — TELEPHONE (OUTPATIENT)
Dept: FAMILY MEDICINE CLINIC | Age: 55
End: 2020-02-13

## 2020-02-13 ENCOUNTER — HOSPITAL ENCOUNTER (OUTPATIENT)
Dept: OTHER | Age: 55
Discharge: HOME | End: 2020-02-13
Payer: COMMERCIAL

## 2020-02-13 VITALS
WEIGHT: 315 LBS | DIASTOLIC BLOOD PRESSURE: 70 MMHG | SYSTOLIC BLOOD PRESSURE: 140 MMHG | HEART RATE: 83 BPM | BODY MASS INDEX: 56.24 KG/M2 | RESPIRATION RATE: 24 BRPM | OXYGEN SATURATION: 98 %

## 2020-02-13 LAB
ANION GAP SERPL CALCULATED.3IONS-SCNC: 16 MMOL/L (ref 9–17)
BNP INTERPRETATION: NORMAL
BUN BLDV-MCNC: 27 MG/DL (ref 6–20)
CHLORIDE BLD-SCNC: 101 MMOL/L (ref 98–107)
CO2: 25 MMOL/L (ref 20–31)
CREAT SERPL-MCNC: 1.78 MG/DL (ref 0.7–1.2)
GFR AFRICAN AMERICAN: 48 ML/MIN
GFR NON-AFRICAN AMERICAN: 40 ML/MIN
GFR SERPL CREATININE-BSD FRML MDRD: ABNORMAL ML/MIN/{1.73_M2}
GFR SERPL CREATININE-BSD FRML MDRD: ABNORMAL ML/MIN/{1.73_M2}
POTASSIUM SERPL-SCNC: 4.2 MMOL/L (ref 3.7–5.3)
PRO-BNP: 190 PG/ML
SODIUM BLD-SCNC: 142 MMOL/L (ref 135–144)

## 2020-02-13 PROCEDURE — 36415 COLL VENOUS BLD VENIPUNCTURE: CPT

## 2020-02-13 PROCEDURE — 80051 ELECTROLYTE PANEL: CPT

## 2020-02-13 PROCEDURE — 84520 ASSAY OF UREA NITROGEN: CPT

## 2020-02-13 PROCEDURE — 82565 ASSAY OF CREATININE: CPT

## 2020-02-13 PROCEDURE — 99212 OFFICE O/P EST SF 10 MIN: CPT

## 2020-02-13 PROCEDURE — 83880 ASSAY OF NATRIURETIC PEPTIDE: CPT

## 2020-02-13 PROCEDURE — 99211 OFF/OP EST MAY X REQ PHY/QHP: CPT

## 2020-02-13 RX ORDER — MELATONIN
1000 DAILY
Qty: 90 TABLET | Refills: 1 | Status: SHIPPED | OUTPATIENT
Start: 2020-02-13 | End: 2020-08-26 | Stop reason: SDUPTHER

## 2020-02-13 RX ORDER — NEBULIZER ACCESSORIES
KIT MISCELLANEOUS
Qty: 1 KIT | Refills: 11 | Status: SHIPPED | OUTPATIENT
Start: 2020-02-13 | End: 2020-02-20 | Stop reason: SDUPTHER

## 2020-02-13 RX ORDER — CEFUROXIME AXETIL 500 MG/1
500 TABLET ORAL EVERY 12 HOURS
Qty: 20 TABLET | Refills: 0 | Status: SHIPPED | OUTPATIENT
Start: 2020-02-13 | End: 2020-02-23

## 2020-02-13 RX ORDER — CHLORHEXIDINE GLUCONATE 4 G/100ML
SOLUTION TOPICAL
Qty: 946 ML | Refills: 3 | Status: SHIPPED | OUTPATIENT
Start: 2020-02-13 | End: 2020-02-20 | Stop reason: SDUPTHER

## 2020-02-13 RX ORDER — SPIRONOLACTONE 50 MG/1
50 TABLET, FILM COATED ORAL DAILY
Qty: 90 TABLET | Refills: 3 | Status: SHIPPED | OUTPATIENT
Start: 2020-02-13 | End: 2020-03-11 | Stop reason: SDUPTHER

## 2020-02-13 RX ORDER — ALBUTEROL SULFATE 2.5 MG/3ML
2.5 SOLUTION RESPIRATORY (INHALATION) EVERY 6 HOURS PRN
Qty: 120 VIAL | Refills: 0 | Status: SHIPPED | OUTPATIENT
Start: 2020-02-13 | End: 2020-04-15 | Stop reason: SDUPTHER

## 2020-02-13 RX ORDER — NITROGLYCERIN 0.4 MG/1
0.4 TABLET SUBLINGUAL EVERY 5 MIN PRN
Qty: 25 TABLET | Refills: 3 | Status: SHIPPED | OUTPATIENT
Start: 2020-02-13 | End: 2021-08-02

## 2020-02-13 RX ORDER — OXYCODONE HYDROCHLORIDE 10 MG/1
10 TABLET ORAL EVERY 8 HOURS PRN
Qty: 90 TABLET | Refills: 0 | Status: SHIPPED | OUTPATIENT
Start: 2020-02-13 | End: 2020-03-11 | Stop reason: SDUPTHER

## 2020-02-13 NOTE — TELEPHONE ENCOUNTER
From: Barry Law.   To: Ammy Benitez MD  Sent: 2/13/2020 6:07 AM EST  Subject: Non-Urgent Medical Question    Can you please Refill my pain meds I have enough to get me to Monday Oxycodone 10 mg One every 8 hours thank you and have a good day

## 2020-02-13 NOTE — PROGRESS NOTES
infection is not resolved and coming back (getting redder now but no discharge at this point)  Has been taking bleach baths twice a week. Insurance will not cover disinfectant PCP had ordered so that is whey they decided to use bleach bath. Message sent to PCP about potential infection recurring. Buzzing in head stable. No further seizures. Patient admits to another fall. Last one about three weeks ago. Does think he injured his ankle more. Hurts and ankle is weak which is contributing to falls. Patient did not call Dr. Lauren Coe for appt. Had long discussion about importance of follow with podiatrist for maintenance but also due to ankle issues. Patient does say he will call and make appt. Did have appt with Dr. Vidya Edwards on 12/2/19 and states he was told he is doing well. Will follow again in 6 months. No episodes of hypoglycemia since last appt. [x]  Missed doses   []  Emergency Room Visit    []  Medication changes  []  Hospitalization   [x]  Diet changes   [x]  Acute illness   []  Activity changes      Symptoms of hypoglycemia    [x]  None    []  Shakiness    []  Lightheadedness or dizziness   []  Confusion      []  Sweating   []  Other        Symptoms of hyperglycemia -.    [x]  None   []  Frequent urination    []  Increased thirst   []  Other    Medication adverse reactions (none due to diabetic medicaitons)   [x]  None   []  Diarrhea / Nausea / Vomiting / Constipation / flatulence   []  Hypertension   []  Peripheral edema     []  Signs of infection   []  Headache   []  Vision changes   []  Increased cholesterol    []  Weight gain   []  Change in renal function   []  Increased potassium  []  Other      Comments:  Patient takes Humulin R U500. Patient has been taking Humulin R U500 0.5ml with breakfast, 0.55ml with lunch and 0.4 with dinner. If recent hospital admission / ED visit, was this related to Diabetes No:Cellulitis.  Discharge date 6/12/19    Objective     PMHx:    Past (chronic obstructive pulmonary disease) ([facility]     Moderate recurrent major depression ([address] 10/2/2016    Morbid obesity with BMI of 45.0-49.9, adult ([address] 2015    Neuropathy     Open wound of groin 2018    BARBY on CPAP     BARBY treated with BiPAP     Osteoarthritis     Otitis externa of left ear     Pancreatitis chronic     Renal insufficiency     proteinuria    S/P cardiac cath 2018    Severe depression ([address] 2013    Spinal stenosis of lumbar region without neurogenic claudication 2016    MRI lumbar 12/30/15 L3-L4: There is broad-based bulging disc which appears protruding left laterally causing flattening of the ventral thecal sac. In addition, there is facet arthropathy with mild hypertrophic changes.  There is borderline central canal stenosis with  evidence of moderate left neural foraminal narrowing and mild right neural foramina narrowing.   L4-L5: There is broad-based protrud    Syncope 2017    Tinnitus of both ears 1/10/2017    Per ENT    Type 2 diabetes mellitus with stage 3 chronic kidney disease, with long-term current use of insulin ([facility] [address] 2016    due to underlying condition with hyperosmolarity without coma    Type II or unspecified type diabetes mellitus without mention of complication, not stated as uncontrolled     uncontrolled    Wears glasses     for reading    Wound of left leg 2019       Social History:    Social History     Tobacco Use    Smoking status: Former Smoker     Packs/day: 0.25     Years: 33.00     Pack years: 8.25     Types: Cigarettes     Start date: 1985     Last attempt to quit: 2020     Years since quittin.0    Smokeless tobacco: Former User     Types: Snuff     Quit date: 1995   Substance Use Topics    Alcohol use: No     Alcohol/week: 0.0 standard drinks       Pertinent Labs:    Lab Results   Component Value Date    LABA1C 6.2 2019    LABA1C 6.1 2019    LABA1C 7.2 2019     Lab times daily. 11/7/19  Yes Kris Albert MD   lisinopril (PRINIVIL;ZESTRIL) 5 MG tablet Take 1 tablet by mouth daily . Discontinued Lisinopril  10 mg 10/10/19  Yes Brianna Vance MD   COMFORT ASSIST INSULIN SYRINGE 31G X 5/16\" 1 ML MISC Use to subcutaneously inject insulin three times daily 10/10/19  Yes Brianna Vance MD   tiotropium (SPIRIVA RESPIMAT) 2.5 MCG/ACT AERS inhaler Inhale 2 puffs into the lungs daily 10/10/19  Yes Brianna Vance MD   bumetanide (BUMEX) 1 MG tablet Take 3 tablets by mouth 2 times daily  Patient taking differently: Take 3 mg by mouth daily  10/10/19  Yes Brianna Vance MD   metoprolol tartrate (LOPRESSOR) 50 MG tablet Take 1 tablet by mouth 2 times daily 10/10/19  Yes Brianna Vance MD   tiZANidine (ZANAFLEX) 4 MG tablet TAKE ONE TABLET BY MOUTH EVERY 8 HOURS AS NEEDED FOR BACK PAIN 10/10/19  Yes Brianna Vance MD   clopidogrel (PLAVIX) 75 MG tablet Take 1 tablet by mouth daily 8/7/19  Yes Brianna Vance MD   nystatin (MYCOSTATIN) 997150 UNIT/GM powder Apply 2-3 times daily in skin folds. 6/19/19  Yes Brianna Vance MD   albuterol (PROVENTIL) (2.5 MG/3ML) 0.083% nebulizer solution Take 3 mLs by nebulization every 6 hours as needed for Wheezing or Shortness of Breath 6/4/19  Yes Brianna Vance MD   Lancet Devices (LANCING DEVICE) MISC Provide patient with lancing device appropriate for his machine/lancing needles.  6/4/19  Yes Brianna Vance MD   pravastatin (PRAVACHOL) 40 MG tablet Take 1 tablet by mouth nightly 5/22/19  Yes Brianna Vance MD   Lidocaine 4 % LOTN Apply topically   Yes Historical Provider, MD   metolazone (ZAROXOLYN) 2.5 MG tablet Take 2.5 mg by mouth three times a week MWF   Yes Historical Provider, MD   PROAIR  (90 Base) MCG/ACT inhaler INHALE TWO PUFFS BY MOUTH EVERY 6 HOURS AS NEEDED FOR WHEEZING OR SHORTNESS OF BREATH 3/27/19  Yes Tayo Angelo MD   spironolactone (ALDACTONE) 50 MG tablet Take 1 tablet by mouth daily 3/18/19  Yes Mckay Mcmullen MD   vitamin D (CHOLECALCIFEROL) 1000 UNIT TABS tablet Take 1 tablet by mouth daily 2/25/19  Yes Mckay Mcmullen MD   miconazole (MICOTIN) 2 % powder Apply topically 2 times daily. 12/20/18  Yes Patrick Stoner MD   insulin regular human (HUMULIN R) 500 UNIT/ML concentrated injection vial Inject into the skin 3 times daily Indications: 50 in the morning 50 at lunch and 40 at dinner. U-500 insulin--Pt. Takes 50 u at breakfast, 55 u lunch and  40 u at supper time. Yes Historical Provider, MD   nitroGLYCERIN (NITROSTAT) 0.4 MG SL tablet Place 1 tablet under the tongue every 5 minutes as needed for Chest pain 4/11/18  Yes Mckay Mcmullen MD   fluticasone (CUTIVATE) 0.05 % cream Apply topically 2 times daily  4/19/17  Yes Historical Provider, MD   fluticasone (FLONASE) 50 MCG/ACT nasal spray 2 sprays by Nasal route daily  Patient taking differently: 2 sprays by Nasal route daily as needed (sinus symptoms)  1/16/17  Yes Mckay Mcmullen MD   Melatonin 10 MG TABS Take 10 mg by mouth nightly as needed (insomnia) 12/23/16  Yes Mckay Mcmullen MD   aspirin 81 MG EC tablet Take 81 mg by mouth daily. Yes Historical Provider, MD   oxyCODONE HCl (OXY-IR) 10 MG immediate release tablet Take 1 tablet by mouth every 8 hours as needed for Pain for up to 30 days. 2/13/20 3/14/20  Mckay Mcmullen MD   chlorhexidine (HIBICLENS) 4 % external liquid Mix 4 oz solution in a bathtub full of water and have baths daily for 1 week, then weekly, for skin decontamination  Patient not taking: Reported on 2/13/2020 1/9/20   Mckay Mcmullen MD   cephALEXin (KEFLEX) 500 MG capsule Take 1 capsule by mouth 4 times daily 1/9/20 2/13/20  Mckay Mcmullen MD   varenicline (CHANTIX STARTING MONTH GUALBERTO) 0.5 MG X 11 & 1 MG X 42 tablet 0.5 mg po daily x 3 days, 0.5 mg BID x 4 days, then 1 mg BID thereafter . Call for refill 1/2/20 2/13/20  Mckay Mcmullen MD   varenicline (CHANTIX CONTINUING MONTH GUALBERTO) 1 MG tablet Take 1 tablet by mouth 2 times daily 1/2/20 2/13/20  John Ponce MD   cyanocobalamin ([employer] VITAMIN B12) 1000 MCG tablet Take 1 tablet by mouth daily  Patient not taking: Reported on 2/13/2020 11/27/19   John Ponce MD   naloxone 4 MG/0.1ML LIQD nasal spray 1 spray by Nasal route as needed for Opioid Reversal  Patient not taking: Reported on 2/13/2020 11/19/19   John Ponce MD   venlafaxine (EFFEXOR XR) 75 MG extended release capsule Take 1 capsule by mouth daily 10/10/19 2/13/20  John Ponce MD   Misc. Devices (HIBICLENS HAND PUMP 32OZ) MISC For skin decontamination daily use, rinse well after the using it  Patient not taking: Reported on 2/13/2020 8/7/19   John Ponce MD   Spacer/Aero Chamber Mouthpiece MISC 1 each by Does not apply route once as needed (to be used with his inhalers) 4/15/19 8/30/19  John Ponce MD   Handicap Placard MISC by Does not apply route Can't walk greater than 200 feet. Expires in 5 years. 2/13/19   John Ponce MD       Immunizations:   Most Recent Immunizations   Administered Date(s) Administered    DT (pediatric) 12/14/1998    Hepatitis B Adult (Engerix-B) 12/04/2019    Influenza Virus Vaccine 10/12/2015    Influenza, Quadv, IM, PF (6 mo and older Fluzone, Flulaval, Fluarix, and 3 yrs and older Afluria) 10/04/2019    Pneumococcal Polysaccharide ([id number]) 05/23/2013    Tdap (Boostrix, Adacel) 09/12/2018       Home Blood Glucose Results:   Patient has blood glucose log with him today. Typical fasting (before breakfast) between 6:30/7am running  . Pre lunch between 12-3pm running 122-137. Pre dinner at 6-8pm has come down a bit from last visit at 136-151. Patient to continue monitoring blood sugar and noting level as well as how much insulin he takes each day.        Blood glucose trends noted: see above    Assessment     A1c at goal:Yes : 6.2 (11/19/19)  Blood Pressure at goal: Typically Yes but noted 140/70 today. Will monitor  Weight at goal: No: 414lbs   Physical activity: Patient unable to exercise regularly currently but will try to incorporate as able due to breathing. Physical activity at goal: No  Patient encouraged. Smoking Cessation: Yes:      Cholesterol at target: No: LDL61,   , HDL29, TRIG 247 (9/12/19)  Annual eye exam completed: Yes  Comprehensive Foot Exam Completed: Yes but encouraged podiatry appt. Annual urine albumin and serum creatinine: Yes    Statin: Yes    Appropriate?: Yes  Changes made:     ACE/ARB: Yes  Appropriate?: Yes  Changes made:     Aspirin: Yes  Appropriate?: Yes  Changes made:     Eating patterns:    [x]  My plate    []  Mediterranean diet   []  Low sodium   []  DASH diet   [x]  Portion control   [x]  Reduced calorie    []  Fast food / Restaurant  []  High glycemic index foods   []  Sugary beverages   [x]  Other - slimfast    Comment: Patient watching carbs.      Current Medications Affecting Diabetes:  Humulin R 500    Compliant: Yes  Barriers to medication therapy: anxiety / depression    Medications attempted in the past:  Metformin - cardiologist took him off but patient unsure why  Januvia - PCP took him off but patient unsure why    Recent exacerbations / new problems:  Infection / fall - ankle pain    Last office dictation reviewed: yes        type 2 DM under acceptable control as evidenced by A1C 6.2 on 11/19/19    Plan       Counseling at today's visit:   -Continued monitoring of blood glucose with documentation on log.    -Patient encouraged to call with any blood sugars below 80 or above 170  --Resume exercise when able   -Continued dose of insulin:0.5ml with breakfast, 0.55ml with lunch  and 0.4mg with dinner.  -Call podiatrist for appt (Dr. Bowman Standard)  -Call pulmonologist for appt (Dr. Flavio Avendaño)  Follow up with counselor  Take all medications as instructed  Put bumetanide and metoazole in pill container.    -        Physician

## 2020-02-17 ENCOUNTER — TELEPHONE (OUTPATIENT)
Dept: FAMILY MEDICINE CLINIC | Age: 55
End: 2020-02-17

## 2020-02-20 ENCOUNTER — OFFICE VISIT (OUTPATIENT)
Dept: FAMILY MEDICINE CLINIC | Age: 55
End: 2020-02-20
Payer: COMMERCIAL

## 2020-02-20 VITALS
HEIGHT: 72 IN | HEART RATE: 79 BPM | SYSTOLIC BLOOD PRESSURE: 120 MMHG | WEIGHT: 315 LBS | BODY MASS INDEX: 42.66 KG/M2 | OXYGEN SATURATION: 96 % | DIASTOLIC BLOOD PRESSURE: 80 MMHG

## 2020-02-20 LAB — HBA1C MFR BLD: 7.3 %

## 2020-02-20 PROCEDURE — 83036 HEMOGLOBIN GLYCOSYLATED A1C: CPT | Performed by: FAMILY MEDICINE

## 2020-02-20 PROCEDURE — 3051F HG A1C>EQUAL 7.0%<8.0%: CPT | Performed by: FAMILY MEDICINE

## 2020-02-20 PROCEDURE — G8482 FLU IMMUNIZE ORDER/ADMIN: HCPCS | Performed by: FAMILY MEDICINE

## 2020-02-20 PROCEDURE — G0438 PPPS, INITIAL VISIT: HCPCS | Performed by: FAMILY MEDICINE

## 2020-02-20 PROCEDURE — 3017F COLORECTAL CA SCREEN DOC REV: CPT | Performed by: FAMILY MEDICINE

## 2020-02-20 RX ORDER — NEBULIZER ACCESSORIES
KIT MISCELLANEOUS
Qty: 1 KIT | Refills: 11 | Status: SHIPPED | OUTPATIENT
Start: 2020-02-20 | End: 2022-08-20 | Stop reason: SDUPTHER

## 2020-02-20 RX ORDER — MUPIROCIN CALCIUM 20 MG/G
CREAM TOPICAL
Qty: 1 TUBE | Refills: 3 | Status: SHIPPED | OUTPATIENT
Start: 2020-02-20 | End: 2020-03-21

## 2020-02-20 RX ORDER — VARENICLINE TARTRATE 1 MG/1
1 TABLET, FILM COATED ORAL 2 TIMES DAILY
Qty: 60 TABLET | Refills: 3 | Status: SHIPPED | OUTPATIENT
Start: 2020-02-20 | End: 2020-06-11 | Stop reason: ALTCHOICE

## 2020-02-20 RX ORDER — ESCITALOPRAM OXALATE 10 MG/1
10 TABLET ORAL DAILY
Qty: 30 TABLET | Refills: 3 | Status: SHIPPED | OUTPATIENT
Start: 2020-02-20 | End: 2020-06-17 | Stop reason: SDUPTHER

## 2020-02-20 RX ORDER — CHLORHEXIDINE GLUCONATE 4 G/100ML
SOLUTION TOPICAL
Qty: 946 ML | Refills: 2 | Status: SHIPPED | OUTPATIENT
Start: 2020-02-20 | End: 2020-09-30

## 2020-02-20 ASSESSMENT — PATIENT HEALTH QUESTIONNAIRE - PHQ9: SUM OF ALL RESPONSES TO PHQ QUESTIONS 1-9: 17

## 2020-02-20 ASSESSMENT — LIFESTYLE VARIABLES: HOW OFTEN DO YOU HAVE A DRINK CONTAINING ALCOHOL: 0

## 2020-02-20 NOTE — RESULT ENCOUNTER NOTE
Addressed during office visit today, A1c 7.3, abnormal, worsening, continue treatment recommended during the office visit

## 2020-02-20 NOTE — PROGRESS NOTES
Medicare Annual Wellness Visit  Name: Benny Dunbar. FRPQTW Date: 2020   MRN: [id number] Sex: Male   Age: 53 y.o. Ethnicity: Non-/Non    : 1965 Race: White      Miguel Long. is here for Medicare AWV    Screenings for behavioral, psychosocial and functional/safety risks, and cognitive dysfunction are all negative except as indicated below. These results, as well as other patient data from the [address] form, are documented in Flowsheets linked to this Encounter. Allergies   Allergen Reactions    Levofloxacin Anaphylaxis     Patient reports needing epinephrine \"about 5 or 6 months ago\" for anaphylaxis (itching, hives, SOB/swelling) after receiving Levofloxacin. Previous report from 2012: Nausea/Vomiting    Lorazepam      Falls      Nsaids      CHF&CKD    Prozac [Fluoxetine Hcl] Other (See Comments)     Pt started with seizures after started taking.  Vancomycin Other (See Comments)     Itching, SOB, emesis upon infusion in ED 2019. Patient states he has had vancomycin \"a number of times\" before without issue. couldn't breath and talk, throat closed       Prior to Visit Medications    Medication Sig Taking? Authorizing Provider   oxyCODONE HCl (OXY-IR) 10 MG immediate release tablet Take 1 tablet by mouth every 8 hours as needed for Pain for up to 30 days. Yes Sriram Rossi MD   Respiratory Therapy Supplies (NEBULIZER/TUBING/MOUTHPIECE) KIT Dx. COPD needs nebulizer supplies Yes Sriram Rossi MD   chlorhexidine (HIBICLENS) 4 % external liquid Mix 4 oz solution in a bathtub full of water and have baths daily for 1 week, then weekly, for skin decontamination Yes Sriram Rossi MD   mupirocin (BACTROBAN) 2 % ointment Apply topically 1-2 times daily on the affected area .  OK to substitute to cream Yes Sriram Rossi MD   albuterol (PROVENTIL) (2.5 MG/3ML) 0.083% nebulizer solution Take 3 mLs by nebulization every 6 hours as fracture 12/3/2015    Chronic     Hyperlipidemia     Hypersomnia     Hypertension     Insomnia     Intolerance of continuous positive airway pressure (CPAP) ventilation 7/20/2017    Mastoiditis of left side     Mixed conductive and sensorineural hearing loss of both ears 1/10/2017    Per ENT    Mixed type COPD (chronic obstructive pulmonary disease) ([address]     Moderate recurrent major depression ([address] 10/2/2016    Morbid obesity with BMI of 45.0-49.9, adult ([address] 6/16/2015    Neuropathy     Open wound of groin 12/19/2018    BARBY on CPAP     BARBY treated with BiPAP     Osteoarthritis     Otitis externa of left ear     Pancreatitis chronic     Renal insufficiency     proteinuria    S/P cardiac cath 04/23/2018    Severe depression ([address] 9/25/2013    Spinal stenosis of lumbar region without neurogenic claudication 1/6/2016    MRI lumbar 12/30/15 L3-L4: There is broad-based bulging disc which appears protruding left laterally causing flattening of the ventral thecal sac. In addition, there is facet arthropathy with mild hypertrophic changes.  There is borderline central canal stenosis with  evidence of moderate left neural foraminal narrowing and mild right neural foramina narrowing.   L4-L5: There is broad-based protrud    Syncope 4/28/2017    Tinnitus of both ears 1/10/2017    Per ENT    Type 2 diabetes mellitus with stage 3 chronic kidney disease, with long-term current use of insulin ([address] 12/26/2016    due to underlying condition with hyperosmolarity without coma    Type II or unspecified type diabetes mellitus without mention of complication, not stated as uncontrolled     uncontrolled    Wears glasses     for reading    Wound of left leg 2/22/2019       Past Surgical History:   Procedure Laterality Date    BACK SURGERY   (x 4) 2000,.12/2011.2/2012     Dr Casey Dent last 2 surg    CARDIAC CATHETERIZATION  04/23/2018    no stenting    CATARACT REMOVAL WITH IMPLANT Right 11/05/2019 Mupirocin was not refilled. He is currently taking the antibiotic and he feels he is better. He declines referral to general surgeon for recurrent cellulitis of the axillary areas, suggested possible evaluation to have removal of the sweat glands, he again declines. Depression is worsening   I gave FMLA to his wife to help him with ADLs. He says he will wife selectively helping  Declines home care, \"didn't work out\" before  He says he sees \"Too many doctors, nobody fixing stuff\". I explained to him nobody can fix , we just can help maintain, he seems to understand. Patient's admits that he was playing with his gun and he wanted to end his life. He decided though to   \"give the gun away\"  Not sleeping well. \"My brain doesn't slow down\"   has been smoking a lot lately      PHQ-2 Over the past 2 weeks, how often have you been bothered by any of the following problems? Little interest or pleasure in doing things: Not at all  Feeling down, depressed, or hopeless: Nearly every day  PHQ-2 Score: 3  PHQ-9 Over the past 2 weeks, how often have you been bothered by any of the following problems? Trouble falling or staying asleep, or sleeping too much: Several days  Feeling tired or having little energy: Nearly every day  Poor appetite or overeating: Several days  Feeling bad about yourself - or that you are a failure or have let yourself or your family down: Nearly every day  Trouble concentrating on things, such as reading the newspaper or watching television: Nearly every day  Moving or speaking so slowly that other people could have noticed.  Or the opposite - being so fidgety or restless that you have been moving around a lot more than usual: Not at all  Thoughts that you would be better off dead, or of hurting yourself in some way: Nearly every day  If you checked off any problems, how difficult have these problems made it for you to do your work, take care of things at home, or get along with other people?: wife is not very supportive, patient declines any further intervention for this issue. · Social isolation: patient's comments regarding inadequate social support: He has been self isolating himself. He declines to come in the office to discuss with our psychologist.    · Stress: patient's comments regarding reasons for stress and/or anger: Related to the multiple medical conditions he has, relaxation techniques discussed, home exercises discussed     · Anger: patient's comments regarding reasons for stress and/or anger: Related to multiple medical conditions, related to the relationship with his wife, relaxation techniques discussed, he declines to come to discuss with our behavioral therapist in the office. He does agree to try Lexapro for his depression and anxiety    · No Living Will: patient declined               Health Habits/Nutrition:  Health Habits/Nutrition  Do you exercise for at least 20 minutes 2-3 times per week?: (!) No  Have you lost any weight without trying in the past 3 months?: (!) Yes  Do you eat fewer than 2 meals per day?: (!) Yes  Have you seen a dentist within the past year?: (!) No  Body mass index is 55.06 kg/m².   Health Habits/Nutrition Interventions:  · Inadequate physical activity:  patient agrees to increase physical activity as follows: As tolerated, we discussed sitting exercises, educational materials provided to promote increased physical activity  · Nutritional issues:  educational materials to promote weight loss provided, Low-carb, low-fat diet discussed  · Dental exam overdue:  patient declines dental evaluation    Hearing/Vision:   Visual Acuity Screening    Right eye Left eye Both eyes   Without correction: 20/40 20/25 20/20   With correction:        Hearing/Vision  Do you or your family notice any trouble with your hearing?: (!) Yes  Do you have difficulty driving, watching TV, or doing any of your daily activities because of your eyesight?: No  Have you had an eye exam within the past year?: Yes  Hearing/Vision Interventions:  · Hearing concerns:  ENT referral provided, I reprinted the referral to ENT and we gave it to him. Patient did report improvement of his eyesight since he had his surgeries, and he says \"it did not make a big difference\"  ·     ADL:  ADLs  In the past 7 days, did you need help from others to perform any of the following everyday activities? Eating, dressing, grooming, bathing, toileting, or walking/balance?: None  In the past 7 days, did you need help from others to take care of any of the following? Laundry, housekeeping, banking/finances, shopping, telephone use, food preparation, transportation, or taking medications?: (!) Shopping  ADL Interventions:  · Patient declines any further evaluation/treatment for this issue  · His daughter and his wife shops for him.   He also drives himself    Personalized Preventive Plan   Current Health Maintenance Status  Immunization History   Administered Date(s) Administered    DT (pediatric) 12/14/1998    Hepatitis B Adult (Engerix-B) 05/29/2019, 07/31/2019, 12/04/2019    Influenza Virus Vaccine 12/16/2013, 10/23/2014, 10/12/2015    Influenza, Quadv, IM, PF (6 mo and older Fluzone, Flulaval, Fluarix, and 3 yrs and older Afluria) 10/11/2016, 11/04/2017, 09/18/2018, 10/04/2019    Pneumococcal Polysaccharide ([id number]) 05/23/2013    Tdap (Boostrix, Adacel) 09/12/2018        Health Maintenance   Topic Date Due    Shingles Vaccine (1 of 2) 12/30/2014    Colon cancer screen colonoscopy  11/03/2017    Annual Wellness Visit (AWV)  05/29/2019    Diabetic retinal exam  10/24/2019    A1C test (Diabetic or Prediabetic)  02/19/2020    Diabetic foot exam  05/02/2020    Lipid screen  09/12/2020    Potassium monitoring  02/13/2021    Creatinine monitoring  02/13/2021    DTaP/Tdap/Td vaccine (3 - Td) 09/12/2028    Hepatitis B vaccine  Completed    Flu vaccine  Completed    Pneumococcal 0-64 years Vaccine  Completed liquid; Mix 4 oz solution in a bathtub full of water and have baths daily for 1 week, then weekly, for decontamination    We discussed skin  decontamination, he might use a smaller amount of water and bleach to substitute the chlorhexidine if not covered  Patient says he knows how to do it, he has done it over the last summer    Need for shingles vaccine  -     zoster recombinant adjuvanted vaccine (SHINGRIX) 50 MCG/0.5ML SUSR injection; Inject 0.5 mLs into the muscle See Admin Instructions 1 dose now and repeat in 2-6 months    Mixed type COPD (chronic obstructive pulmonary disease) (HCC)  Worsening  Counseling given to quit smoking  Continue current treatment and restart nebulizer treatments  We gave him a sample of nebulizer tubing  -     Respiratory Therapy Supplies (NEBULIZER/TUBING/MOUTHPIECE) KIT; Dx COPD needs nebulizer supplies    Chronic diastolic congestive heart failure (HCC)  Stable-        Continue current treatment   comprehensive Metabolic Panel; Future  -     CBC; Future  -     Brain Natriuretic Peptide; Future      Controlled Substance Monitoring:    Acute and Chronic Pain Monitoring:   RX Monitoring 2/20/2020   Attestation -   Periodic Controlled Substance Monitoring Possible medication side effects, risk of tolerance/dependence & alternative treatments discussed. ;No signs of potential drug abuse or diversion identified. ;Assessed functional status.    Chronic Pain > 50 MEDD -   Chronic Pain > 80 MEDD -               Future Appointments   Date Time Provider William Hernandez   3/19/2020  7:00 AM [facility] CHF CLINIC RM [address]   3/19/2020  7:30 AM [facility] MEDICATION MGMT [facility] MED MGMT [address]   5/29/2020  7:30 AM MD nani Young   2/23/2021  8:00 AM MD nani Young sc ANT

## 2020-02-20 NOTE — PROGRESS NOTES
Visit Information    Have you changed or started any medications since your last visit including any over-the-counter medicines, vitamins, or herbal medicines? no   Are you having any side effects from any of your medications? -  no  Have you stopped taking any of your medications? Is so, why? -  no    Have you seen any other physician or provider since your last visit? No  Have you had any other diagnostic tests since your last visit? No  Have you been seen in the emergency room and/or had an admission to a hospital since we last saw you? No  Have you had your routine dental cleaning in the past 6 months? no    Have you activated your [employer] account? If not, what are your barriers?  Yes     Patient Care Team:  Melvin Burgos MD as PCP - General (Family Medicine)  Melvin Burgos MD as PCP - [facility]  Cloretta Eisenmenger, MD as Consulting Physician (Otolaryngology)  Alana Mcallister MD as Consulting Physician (Endocrinology)  Anisha Ruggiero DO as Consulting Physician (Cardiology)  Reed Lopez DPM as Surgeon (Podiatry)  Luis Denny MD as Consulting Physician (Ophthalmology)  Mary Reis MD as Consulting Physician (Nephrology)  Stevo Reeves MD as Consulting Physician (Pulmonology)  Maria L Ching MD as Surgeon (Vascular Surgery)  Jean Ross MD as Consulting Physician (Gastroenterology)  Lamont Russo [facility] as Pharmacist (Pharmacist)  Obinna Mobley MD as Consulting Physician (Pulmonology)  Juan Conti MD as Consulting Physician (Urology)  Josias Robledo MD as Surgeon (Ophthalmology)  Neri Hua MD as Consulting Physician (Neurology)  [facility]    Medical History Review  Past Medical, Family, and Social History reviewed and does contribute to the patient presenting condition    Health Maintenance   Topic Date Due    Shingles Vaccine (1 of 2) 12/30/2014    Colon cancer screen colonoscopy  11/03/2017    Annual Wellness Visit (AWV)  05/29/2019    Diabetic retinal exam  10/24/2019    A1C test (Diabetic or Prediabetic)  02/19/2020    Diabetic foot exam  05/02/2020    Lipid screen  09/12/2020    Potassium monitoring  02/13/2021    Creatinine monitoring  02/13/2021    DTaP/Tdap/Td vaccine (3 - Td) 09/12/2028    Hepatitis B vaccine  Completed    Flu vaccine  Completed    Pneumococcal 0-64 years Vaccine  Completed    Hepatitis C screen  Completed    HIV screen  Completed    Hepatitis A vaccine  Aged Out    Hib vaccine  Aged Out    Meningococcal (ACWY) vaccine  Aged Out

## 2020-02-20 NOTE — PATIENT INSTRUCTIONS
-Decontamination discussed. Have bleach baths daily for 1 week, then weekly long term to prevent other infections. Mix 1/2 cup (4 oz) bleach in a full bathtub. Alternatively, you can use Chlorhexidine 4%, also 1/2 cup mixed with a full bathtub.     ===============           Learning About Low-Carbohydrate Diets for Weight Loss  What is a low-carbohydrate diet? Low-carb diets avoid foods that are high in carbohydrate. These high-carb foods include pasta, bread, rice, cereal, fruits, and starchy vegetables. Instead, these diets usually have you eat foods that are high in fat and protein. Many people lose weight quickly on a low-carb diet. But the early weight loss is water. People on this diet often gain the weight back after they start eating carbs again. Not all diet plans are safe or work well. A lot of the evidence shows that low-carb diets aren't healthy. That's because these diets often don't include healthy foods like fruits and vegetables. Losing weight safely means balancing protein, fat, and carbs with every meal and snack. And low-carb diets don't always provide the vitamins, minerals, and fiber you need. If you have a serious medical condition, talk to your doctor before you try any diet. These conditions include kidney disease, heart disease, type 2 diabetes, high cholesterol, and high blood pressure. If you are pregnant, it may not be safe for your baby if you are on a low-carb diet. How can you lose weight safely? You might have heard that a diet plan helped another person lose weight. But that doesn't mean that it will work for you. It is very hard to stay on a diet that includes lots of big changes in your eating habits. If you want to get to a healthy weight and stay there, making healthy lifestyle changes will often work better than dieting. These steps can help. · Make a plan for change. Work with your doctor to create a plan that is right for you. · See a dietitian.  He or she can show you how to make healthy changes in your eating habits. · Manage stress. If you have a lot of stress in your life, it can be hard to focus on making healthy changes to your daily habits. · Track your food and activity. You are likely to do better at losing weight if you keep track of what you eat and what you do. Follow-up care is a key part of your treatment and safety. Be sure to make and go to all appointments, and call your doctor if you are having problems. It's also a good idea to know your test results and keep a list of the medicines you take. Where can you learn more? Go to [email or web address] org and sign in to your [employer] account. Enter A121 in the [employer] box to learn more about \"Learning About Low-Carbohydrate Diets for Weight Loss. \"     If you do not have an account, please click on the \"Sign Up Now\" link. Current as of: August 21, 2019  Content Version: 12.3  © 0268-0733 Healthwise, [employer]. Care instructions adapted under license by [facility] ([facility]). If you have questions about a medical condition or this instruction, always ask your healthcare professional. [address] any warranty or liability for your use of this information. Eating Healthy Foods: Care Instructions  Your Care Instructions    Eating healthy foods can help lower your risk for disease. Healthy food gives you energy and keeps your heart strong, your brain active, your muscles working, and your bones strong. A healthy diet includes a variety of foods from the basic food groups: grains, vegetables, fruits, milk and milk products, and meat and beans. Some people may eat more of their favorite foods from only one food group and, as a result, miss getting the nutrients they need. So, it is important to pay attention not only to what you eat but also to what you are missing from your diet.  You can eat a healthy, balanced diet by making a few small T756 in the [facility] box to learn more about \"Eating Healthy Foods: Care Instructions. \"     If you do not have an account, please click on the \"Sign Up Now\" link. Current as of: August 21, 2019  Content Version: 12.3  © 3568-0332 Healthwise, Incorporated. Care instructions adapted under license by [facility] ([facility]). If you have questions about a medical condition or this instruction, always ask your healthcare professional. [address] any warranty or liability for your use of this information. Personalized Preventive Plan for Lucian Rios. - 2/20/2020  Medicare offers a range of preventive health benefits. Some of the tests and screenings are paid in full while other may be subject to a deductible, co-insurance, and/or copay. Some of these benefits include a comprehensive review of your medical history including lifestyle, illnesses that may run in your family, and various assessments and screenings as appropriate. After reviewing your medical record and screening and assessments performed today your provider may have ordered immunizations, labs, imaging, and/or referrals for you. A list of these orders (if applicable) as well as your Preventive Care list are included within your After Visit Summary for your review. Other Preventive Recommendations:    · A preventive eye exam performed by an eye specialist is recommended every 1-2 years to screen for glaucoma; cataracts, macular degeneration, and other eye disorders. · A preventive dental visit is recommended every 6 months. · Try to get at least 150 minutes of exercise per week or 10,000 steps per day on a pedometer . · Order or download the FREE \"Exercise & Physical Activity: Your Everyday Guide\" from [employer]. Call 6-302.626.3791 or search [employer] online. · You need 2056-2314 mg of calcium and 6964-7835 IU of vitamin D per day.  It is possible to meet your calcium requirement with diet alone, but a vitamin D supplement is usually necessary to meet this goal.  · When exposed to the sun, use a sunscreen that protects against both UVA and UVB radiation with an SPF of 30 or greater. Reapply every 2 to 3 hours or after sweating, drying off with a towel, or swimming. · Always wear a seat belt when traveling in a car. Always wear a helmet when riding a bicycle or motorcycle. Heart-Healthy Diet   Sodium, Fat, and Cholesterol Controlled Diet       What Is a Heart Healthy Diet? A heart-healthy diet is one that limits sodium , certain types of fat , and cholesterol . This type of diet is recommended for:   People with any form of cardiovascular disease (eg, coronary heart disease , peripheral vascular disease , previous heart attack , previous stroke )   People with risk factors for cardiovascular disease, such as high blood pressure , high cholesterol , or diabetes   Anyone who wants to lower their risk of developing cardiovascular disease   Sodium    Sodium is a mineral found in many foods. In general, most people consume much more sodium than they need. Diets high in sodium can increase blood pressure and lead to edema (water retention). On a heart-healthy diet, you should consume no more than 2,300 mg (milligrams) of sodium per dayabout the amount in one teaspoon of table salt. The foods highest in sodium include table salt (about 50% sodium), processed foods, convenience foods, and preserved foods. Cholesterol    Cholesterol is a fat-like, waxy substance in your blood. Our bodies make some cholesterol. It is also found in animal products, with the highest amounts in fatty meat, egg yolks, whole milk, cheese, shellfish, and organ meats. On a heart-healthy diet, you should limit your cholesterol intake to less than 200 mg per day.    It is normal and important to have some cholesterol in your bloodstream. But too much cholesterol can cause plaque to build up within Animal fats that are saturated include: butter, lard, whole-milk dairy products, meat fat, and poultry skin   Vegetable fats that are saturated include: hydrogenated shortening, palm oil, coconut oil, cocoa butter   Hydrogenated or trans fat  found in margarine and vegetable shortening, most shelf stable snack foods, and fried foods; increases LDL and decreases HDL     It is generally recommended that you limit your total fat for the day to less than 30% of your total calories. If you follow an 1800-calorie heart healthy diet, for example, this would mean 60 grams of fat or less per day. Saturated fat and trans fat in your diet raises your blood cholesterol the most, much more than dietary cholesterol does. For this reason, on a heart-healthy diet, less than 7% of your calories should come from saturated fat and ideally 0% from trans fat. On an 1800-calorie diet, this translates into less than 14 grams of saturated fat per day, leaving 46 grams of fat to come from mono- and polyunsaturated fats.    Food Choices on a Heart Healthy Diet   Food Category   Foods Recommended   Foods to Avoid   Grains   Breads and rolls without salted tops Most dry and cooked cereals Unsalted crackers and breadsticks Low-sodium or homemade breadcrumbs or stuffing All rice and pastas   Breads, rolls, and crackers with salted tops High-fat baked goods (eg, muffins, donuts, pastries) Quick breads, self-rising flour, and biscuit mixes Regular bread crumbs Instant hot cereals Commercially prepared rice, pasta, or stuffing mixes   Vegetables   Most fresh, frozen, and low-sodium canned vegetables Low-sodium and salt-free vegetable juices Canned vegetables if unsalted or rinsed   Regular canned vegetables and juices, including sauerkraut and pickled vegetables Frozen vegetables with sauces Commercially prepared potato and vegetable mixes   Fruits   Most fresh, frozen, and canned fruits All fruit juices   Fruits processed with salt or sodium Milk   Nonfat or low-fat (1%) milk Nonfat or low-fat yogurt Cottage cheese, low-fat ricotta, cheeses labeled as low-fat and low-sodium   Whole milk Reduced-fat (2%) milk Malted and chocolate milk Full fat yogurt Most cheeses (unless low-fat and low salt) Buttermilk (no more than 1 cup per week)   Meats and Beans   Lean cuts of fresh or frozen beef, veal, lamb, or pork (look for the word loin) Fresh or frozen poultry without the skin Fresh or frozen fish and some shellfish Egg whites and egg substitutes (Limit whole eggs to three per week) Tofu Nuts or seeds (unsalted, dry-roasted), low-sodium peanut butter Dried peas, beans, and lentils   Any smoked, cured, salted, or canned meat, fish, or poultry (including villegas, chipped beef, cold cuts, hot dogs, sausages, sardines, and anchovies) Poultry skins Breaded and/or fried fish or meats Canned peas, beans, and lentils Salted nuts   Fats and Oils   Olive oil and canola oil Low-sodium, low-fat salad dressings and mayonnaise   Butter, margarine, coconut and palm oils, villegas fat   Snacks, Sweets, and Condiments   Low-sodium or unsalted versions of broths, soups, soy sauce, and condiments Pepper, herbs, and spices; vinegar, lemon, or lime juice Low-fat frozen desserts (yogurt, sherbet, fruit bars) Sugar, cocoa powder, honey, syrup, jam, and preserves Low-fat, trans-fat free cookies, cakes, and pies Kanu and animal crackers, fig bars, sabrina snaps   High-fat desserts Broth, soups, gravies, and sauces, made from instant mixes or other high-sodium ingredients Salted snack foods Canned olives Meat tenderizers, seasoning salt, and most flavored vinegars   Beverages   Low-sodium carbonated beverages Tea and coffee in moderation Soy milk   Commercially softened water   Suggestions   Make whole grains, fruits, and vegetables the base of your diet.     Choose heart-healthy fats such as canola, olive, and flaxseed oil, and foods high in heart-healthy fats, such as nuts, seeds, lemonade. Tips for success  Start small. Do not try to make dramatic changes to your diet all at once. You might feel that you are missing out on your favorite foods and then be more likely to not follow the plan. Make small changes, and stick with them. Once those changes become habit, add a few more changes. Try some of the following:  Make it a goal to eat a fruit or vegetable at every meal and at snacks. This will make it easy to get the recommended amount of fruits and vegetables each day. Try yogurt topped with fruit and nuts for a snack or healthy dessert. Add lettuce, tomato, cucumber, and onion to sandwiches. Combine a ready-made pizza crust with low-fat mozzarella cheese and lots of vegetable toppings. Try using tomatoes, squash, spinach, broccoli, carrots, cauliflower, and onions. Have a variety of cut-up vegetables with a low-fat dip as an appetizer instead of chips and dip. Sprinkle sunflower seeds or chopped almonds over salads. Or try adding chopped walnuts or almonds to cooked vegetables. Try some vegetarian meals using beans and peas. Add garbanzo or kidney beans to salads. Make burritos and tacos with mashed ca beans or black beans    © 1241-1225 Healthwise, Incorporated. Care instructions adapted under license by [facility]. This care instruction is for use with your licensed healthcare professional. If you have questions about a medical condition or this instruction, always ask your healthcare professional. [place] [address] any warranty or liability for your use of this information. Content Version: 9.4.07348; Last Revised: March 15, 2012              ·   Smoking Cessation  This document explains the best ways for you to quit smoking and new treatments to help. It lists new medicines that can double or triple your chances of quitting and quitting for good.  It also considers ways to avoid relapses and concerns you may have about quitting, including weight gain. NICOTINE: A POWERFUL ADDICTION  If you have tried to quit smoking, you know how hard it can be. It is hard because nicotine is a very addictive drug. For some people, it can be as addictive as heroin or cocaine. Usually, people make 2 or 3 tries, or more, before finally being able to quit. Each time you try to quit, you can learn about what helps and what hurts. Quitting takes hard work and a lot of effort, but you can quit smoking. QUITTING SMOKING IS ONE OF THE MOST IMPORTANT THINGS YOU WILL EVER DO. You will live longer, feel better, and live better. The impact on your body of quitting smoking is felt almost immediately:  Within 20 minutes, blood pressure decreases. Pulse returns to its normal level. After 8 hours, carbon monoxide levels in the blood return to normal. Oxygen level increases. After 24 hours, chance of heart attack starts to decrease. Breath, hair, and body stop smelling like smoke. After 48 hours, damaged nerve endings begin to recover. Sense of taste and smell improve. After 72 hours, the body is virtually free of nicotine. Bronchial tubes relax and breathing becomes easier. After 2 to 12 weeks, lungs can hold more air. Exercise becomes easier and circulation improves. Quitting will reduce your risk of having a heart attack, stroke, cancer, or lung disease:  After 1 year, the risk of coronary heart disease is cut in half. After 5 years, the risk of stroke falls to the same as a nonsmoker. After 10 years, the risk of lung cancer is cut in half and the risk of other cancers decreases significantly. After 15 years, the risk of coronary heart disease drops, usually to the level of a nonsmoker. If you are pregnant, quitting smoking will improve your chances of having a healthy baby. The people you live with, especially your children, will be healthier. You will have extra money to spend on things other than cigarettes.   FIVE KEYS TO QUITTING  Studies have shown that these make even the most youthful senior more prone to accidents. Falls are one of the leading health risks for older people. This increased risk of falling is related to:   Aging process (eg, decreased muscle strength, slowed reflexes)   Higher incidence of chronic health problems (eg, arthritis, diabetes) that may limit mobility, agility or sensory awareness   Side effects of medicine (eg, dizziness, blurred vision)especially medicines like prescription pain medicines and drugs used to treat mental health conditions   Depending on the brittleness of your bones, the consequences of a fall can be serious and long lasting. Home Life   Research by the Association of Aging [facility]) shows that some home accidents among older adults can be prevented by making simple lifestyle changes and basic modifications and repairs to the home environment. Here are some lifestyle changes that experts recommend:   Have your hearing and vision checked regularly. Be sure to wear prescription glasses that are right for you. Speak to your doctor or pharmacist about the possible side effects of your medicines. A number of medicines can cause dizziness. If you have problems with sleep, talk to your doctor. Limit your intake of alcohol. If necessary, use a cane or walker to help maintain your balance. Wear supportive, rubber-soled shoes, even at home. If you live in a region that gets wintry weather, you may want to put special cleats on your shoes to prevent you from slipping on the snow and ice. Exercise regularly to help maintain muscle tone, agility, and balance. Always hold the banister when going up or down stairs. Also, use  bars when getting in or out of the bath or shower, or using the toilet. To avoid dizziness, get up slowly from a lying down position. Sit up first, dangling your legs for a minute or two before rising to a standing position.    Overall Home Safety Check   According to the Consumer Product Safety area except when in use. Make sure kitchen curtains are tied back. Move cords and appliances away from the sink and hot surfaces. If extension cords are needed, install wiring guides so they do not hang over the sink, range, or working areas. Look for coffee pots, kettles and toaster ovens with automatic shut-offs. Keep a mop handy in the kitchen so you can wipe up spills instantly. You should also have a small fire extinguisher. Arrange your kitchen with frequently used items on lower shelves to avoid the need to stand on a stepstool to reach them. Make sure countertops are well-lit to avoid injuries while cutting and preparing food. In the Bathroom    Use a non-slip mat or decals in the tub and shower, since wet, soapy tile or porcelain surfaces are extremely slippery. Make sure bathroom rugs are non-skid or tape them firmly to the floor. Bathtubs should have at least one, preferably two, grab bars, firmly attached to structural supports in the wall. (Do not use built-in soap holders or glass shower doors as grab bars.)    Tub seats fitted with non-slip material on the legs allow you to wash sitting down. For people with limited mobility, bathtub transfer benches allow you to slide safely into the tub. Raised toilet seats and toilet safety rails are helpful for those with knee or hip problems. In the [facility]    Make sure you use a nightlight and that the area around your bed is clear of potential obstacles. Be careful with electric blankets and never go to sleep with a heating pad, which can cause serious burns even if on a low setting. Use fire-resistant mattress covers and pillows, and NEVER smoke in bed. Keep a phone next to the bed that is programmed to dial 911 at the push of a button. If you have a chronic condition, you may want to sign on with an automatic call-in service.  Typically the system includes a small pendant that connects directly to an emergency medical voice-response system. You should also make arrangements to stay in contact with someonefriend, neighbor, family memberon a regular schedule. Fire Prevention   According to the [employer]. (Smoke Alarms for Every) [facility], senior citizens are one of the two highest risk groups for death and serious injuries due to residential fires. When cooking, wear short-sleeved items, never a bulky long-sleeved robe. The [facility]'s Safety Checklist for Older Consumers emphasizes the importance of checking basements, garages, workshops and storage areas for fire hazards, such as volatile liquids, piles of old rags or clothing and overloaded circuits. Never smoke in bed or when lying down on a couch or recliner chair. Small portable electric or kerosene heaters are responsible for many home fires and should be used cautiously if at all. If you do use one, be sure to keep them away from flammable materials. In case of fire, make sure you have a pre-established emergency exit plan. Have a professional check your fireplace and other fuel-burning appliances yearly. [employer]   Baby boomers entering the guerrero years will continue to see the development of new products to help older adults live safely and independently in spite of age-related changes. Making Life More Livable  , by Hal[employer]iding, lists over 1,000 products for \"living well in the mature years,\" such as bathing and mobility aids, household security devices, ergonomically designed knives and peelers, and faucet valves and knobs for temperature control. Medical supply stores and organizations are good sources of information about products that improve your quality of life and insure your safety.      Last Reviewed: November 2009 Sarahi Ellis MD   Updated: 3/7/2011   2  ·

## 2020-02-20 NOTE — LETTER
supplements and oral decongestants. Dependence withdrawal symptoms may include depressed mood, loss of interest, suicidal thoughts, anxiety, fatigue, appetite changes and agitation. Testosterone replacement therapy:  Potential side effects include increased risk of stroke and heart attack, blood clots, increased blood pressure, increased cholesterol, enlarged prostate, sleep apnea, irritability/aggression and other mood disorders, and decreased fertility. Other:     1. I understand that I have the following responsibilities:  · I will take medications at the dose and frequency prescribed. · I will not increase or change how I take my medications without the approval of the health care provider who signs this Medication Agreement. · I will arrange for refills at the prescribed interval ONLY during regular office hours. I will not ask for refills earlier than agreed, after-hours, on holidays or on weekends. · I will obtain all refills for these medications at  ·  ____________________________________  pharmacy (phone number  ·  ________________________), with full consent for my provider and pharmacist to exchange information in writing or verbally. · I will not request any pain medications or controlled substances from other providers and will inform this provider of all other medications I am taking. · I will inform my other health care providers that I am taking these medications and of the existence of this [address]. In the event of an emergency, I will provide the same information to the emergency department providers. · I will protect my prescriptions and medications. I understand that lost or misplaced prescriptions will not be replaced. · I will keep medications only for my own use and will not share them with others. I will keep all medications away from children. · I agree to participate in any medical, psychological or psychiatric assessments recommended by my provider. · I will actively participate in any program designed to improve function, including social, physical, psychological and daily or work activities. 2. I will not use illegal or street drugs or another person's prescription. If I have an addiction problem with drugs or alcohol and my provider asks me to enter a program to address this issue, I agree to follow through. Such programs may include:  · 12-Step program and securing a sponsor  · Individual counseling   · Inpatient or outpatient treatment  · Other:_____________________________________________________________________________________________________________________________________________    If in treatment, I will request that a copy of the programs initial evaluation and treatment recommendations be sent to this provider and will not expect refills until that is received. I will also request written monthly updates be sent to this provider to verify my continuing treatment. 3. I will consent to drug screening upon my providers request to assure I am only taking the prescribed drugs, described in this MEDICATION AGREEMENT. I understand that a drug screen is a laboratory test in which a sample of my urine, blood or saliva is checked to see what drugs I have been taking. 4. I agree that I will treat the providers and staff at this office with respect at all times. I will keep all of my scheduled appointments, but if I need to cancel my appointment, I will do so a minimum of 24 hours before it is scheduled. 5. I understand that this provider may stop prescribing the medications listed if:  · I do not show any improvement in pain, or my activity has not improved. · I develop rapid tolerance or loss of improvement, as described in my treatment plan. · I develop significant side effects from the medication.   · My behavior is inconsistent with the responsibilities outlined above, which may also result in my being prevented from receiving further care from this office. · Other:____________________________________________________________________    AGREEMENT:    I have read the above and have had all of my questions answered. For chronic disease management, I know that my symptoms can be managed with many types of treatments. A chronic medication trial may be part of my treatment, but I must be an active participant in my care. Medication therapy is only one part of my symptom management plan. In some cases, there may be limited scientific evidence to support the chronic use of certain medications to improve symptoms and daily function. Furthermore, in certain circumstances, there may be scientific information that suggests that use of chronic controlled substances may actually worsen my symptoms and increase my risk of unintentional death directly related to this medication therapy. I know that if my provider feels my risk from controlled medications is greater than my benefit, I will have my controlled substance medication(s) compassionately lowered or removed altogether. I agree to a controlled substance medication trial.      I further agree to allow this office to contact my HIPAA contact on file if there are concerns about my safety and use of controlled medications. I have agreed to use the following medications above as instructed by my physician and as stated in this [address].      Patient Signature:  ______________________  Date:2/20/2020 or _____________    Provider Signature:______________________  MXGP:[id number]/61/4428 or _____________

## 2020-02-21 ENCOUNTER — TELEPHONE (OUTPATIENT)
Dept: FAMILY MEDICINE CLINIC | Age: 55
End: 2020-02-21

## 2020-03-03 ENCOUNTER — HOSPITAL ENCOUNTER (OUTPATIENT)
Age: 55
Discharge: HOME | End: 2020-03-03
Payer: COMMERCIAL

## 2020-03-03 LAB
ALT SERPL-CCNC: 15 U/L (ref 5–41)
CHOLESTEROL, FASTING: 154 MG/DL
CHOLESTEROL/HDL RATIO: 4.4
HDLC SERPL-MCNC: 35 MG/DL
LDL CHOLESTEROL: 75 MG/DL (ref 0–130)
TRIGLYCERIDE, FASTING: 218 MG/DL
VLDLC SERPL CALC-MCNC: ABNORMAL MG/DL (ref 1–30)

## 2020-03-03 PROCEDURE — 84460 ALANINE AMINO (ALT) (SGPT): CPT

## 2020-03-03 PROCEDURE — 36415 COLL VENOUS BLD VENIPUNCTURE: CPT

## 2020-03-03 PROCEDURE — 80061 LIPID PANEL: CPT

## 2020-03-11 ENCOUNTER — PATIENT MESSAGE (OUTPATIENT)
Dept: FAMILY MEDICINE CLINIC | Age: 55
End: 2020-03-11

## 2020-03-11 RX ORDER — OXYCODONE HYDROCHLORIDE 10 MG/1
10 TABLET ORAL EVERY 8 HOURS PRN
Qty: 90 TABLET | Refills: 0 | Status: SHIPPED | OUTPATIENT
Start: 2020-03-11 | End: 2020-04-09 | Stop reason: SDUPTHER

## 2020-03-11 RX ORDER — SPIRONOLACTONE 50 MG/1
50 TABLET, FILM COATED ORAL DAILY
Qty: 90 TABLET | Refills: 3 | Status: SHIPPED | OUTPATIENT
Start: 2020-03-11 | End: 2021-02-24 | Stop reason: SDUPTHER

## 2020-03-17 ENCOUNTER — TELEPHONE (OUTPATIENT)
Dept: PHARMACY | Age: 55
End: 2020-03-17

## 2020-03-19 ENCOUNTER — TELEPHONE (OUTPATIENT)
Dept: PHARMACY | Age: 55
End: 2020-03-19

## 2020-03-19 NOTE — TELEPHONE ENCOUNTER
35.9 in adult ([address]     (BMI 35.0-39.9 without comorbidity)    COPD (chronic obstructive pulmonary disease) ([address]     COPD exacerbation ([address] 11/2/2016    Diabetic neuropathy (HCC) 8/14/2013    Displacement of lumbar intervertebral disc without myelopathy 6/13/2013    Ear infection     RIGHT    Facial cellulitis 2012    Fall 3/25/2017    Former smoker     GERD (gastroesophageal reflux disease)     Head injury     Hearing loss in right ear     pencil pierced ear as a child    Hepatic steatosis 12/3/2015    History of general anesthesia complication     has woke up during surgery under anesthesia    History of rib fracture 12/3/2015    Chronic     Hyperlipidemia     Hypersomnia     Hypertension     Insomnia     Intolerance of continuous positive airway pressure (CPAP) ventilation 7/20/2017    Mastoiditis of left side     Mixed conductive and sensorineural hearing loss of both ears 1/10/2017    Per ENT    Mixed type COPD (chronic obstructive pulmonary disease) ([address]     Moderate recurrent major depression ([address] 10/2/2016    Morbid obesity with BMI of 45.0-49.9, adult ([address] 6/16/2015    Neuropathy     Open wound of groin 12/19/2018    BARBY on CPAP     BARBY treated with BiPAP     Osteoarthritis     Otitis externa of left ear     Pancreatitis chronic     Renal insufficiency     proteinuria    S/P cardiac cath 04/23/2018    Severe depression ([address] 9/25/2013    Spinal stenosis of lumbar region without neurogenic claudication 1/6/2016    MRI lumbar 12/30/15 L3-L4: There is broad-based bulging disc which appears protruding left laterally causing flattening of the ventral thecal sac. In addition, there is facet arthropathy with mild hypertrophic changes.  There is borderline central canal stenosis with  evidence of moderate left neural foraminal narrowing and mild right neural foramina narrowing.   L4-L5: There is broad-based protrud    Syncope 4/28/2017    Tinnitus of both ears 1/10/2017 Per ENT    Type 2 diabetes mellitus with stage 3 chronic kidney disease, with long-term current use of insulin ([facility] 2016    due to underlying condition with hyperosmolarity without coma    Type II or unspecified type diabetes mellitus without mention of complication, not stated as uncontrolled     uncontrolled    Wears glasses     for reading    Wound of left leg 2019       Social History:    Social History     Tobacco Use    Smoking status: Former Smoker     Packs/day: 0.25     Years: 33.00     Pack years: 8.25     Types: Cigarettes     Start date: 1985     Last attempt to quit: 2020     Years since quittin.1    Smokeless tobacco: Former User     Types: Snuff     Quit date: 1995   Substance Use Topics    Alcohol use: No     Alcohol/week: 0.0 standard drinks       Pertinent Labs:    Lab Results   Component Value Date    LABA1C 7.3 2020    LABA1C 6.2 2019    LABA1C 6.1 2019     Lab Results   Component Value Date    CHOL 139 2019    TRIG 247 (H) 2019    HDL 35 (L) 2020     Lab Results   Component Value Date    CREATININE 1.78 (H) 2020    BUN 27 (H) 2020     2020    K 4.2 2020     2020    CO2 25 2020     Lab Results   Component Value Date    ALT 15 2020       Weight: Patient states weight from home scale is 401lbs. Wt Readings from Last 3 Encounters:   20 (!) 406 lb (184.2 kg)   20 (!) 414 lb 11.2 oz (188.1 kg)   20 (!) 411 lb (186.4 kg)       Current medications:  Prior to Admission medications    Medication Sig Start Date End Date Taking? Authorizing Provider   oxyCODONE HCl (OXY-IR) 10 MG immediate release tablet Take 1 tablet by mouth every 8 hours as needed for Pain for up to 30 days.  3/11/20 4/10/20  Sriram Rossi MD   spironolactone (ALDACTONE) 50 MG tablet Take 1 tablet by mouth daily 3/11/20   Sriram Rossi MD   zoster recombinant adjuvanted vaccine [facility]) 50 MCG/0.5ML SUSR injection Inject 0.5 mLs into the muscle See Admin Instructions 1 dose now and repeat in 2-6 months 2/20/20   Tonia Romero MD   mupirocin (BACTROBAN) 2 % cream Apply 2-3 times daily for skin infection 2/20/20 3/21/20  Tonia Romero MD   chlorhexidine (HIBICLENS) 4 % external liquid Mix 4 oz solution in a bathtub full of water and have baths daily for 1 week, then weekly, for decontamination 2/20/20   Tonia Romero MD   escitalopram (LEXAPRO) 10 MG tablet Take 1 tablet by mouth daily 2/20/20   Tonia Romero MD   varenicline (CHANTIX CONTINUING MONTH PAK) 1 MG tablet Take 1 tablet by mouth 2 times daily 2/20/20   Tonia Romero MD   Respiratory Therapy Supplies (NEBULIZER/TUBING/MOUTHPIECE) KIT Dx COPD needs nebulizer supplies 2/20/20   Tonia Romero MD   mupirocin (BACTROBAN) 2 % ointment Apply topically 1-2 times daily on the affected area .  OK to substitute to cream 2/13/20   Tonia Romero MD   albuterol (PROVENTIL) (2.5 MG/3ML) 0.083% nebulizer solution Take 3 mLs by nebulization every 6 hours as needed for Wheezing or Shortness of Breath 2/13/20   Tonia Romero MD   Cholecalciferol (VITAMIN D3) 25 MCG (1000 UT) TABS Take 1 tablet by mouth daily 2/13/20   Tonia Romero MD   nitroGLYCERIN (NITROSTAT) 0.4 MG SL tablet Place 1 tablet under the tongue every 5 minutes as needed for Chest pain (and call 911) 2/13/20   Tonia Romero MD   ADVAIR -21 MCG/ACT inhaler INHALE TWO PUFFS BY MOUTH TWICE A DAY 2/3/20   Tonia Romero MD   pantoprazole (PROTONIX) 40 MG tablet Take 1 tablet by mouth nightly 1/15/20   Tonia Romero MD   docusate sodium (STOOL SOFTENER) 100 MG capsule Take 1 capsule by mouth 2 times daily 1/15/20   Tonia Romero MD   diclofenac sodium (VOLTAREN) 1 % GEL Apply 2 g topically 2 times daily 12/5/19   Esperanza Lucia, APRN - CNP   Ferrous Sulfate (IRON) 325 (65 Fe) MG TABS Take 1 tablet by not apply route once as needed (to be used with his inhalers) 4/15/19 8/30/19  Juliano Cotton MD   metolazone (ZAROXOLYN) 2.5 MG tablet Take 2.5 mg by mouth three times a week [facility]    Historical Provider, MD   PROAIR  (90 Base) MCG/ACT inhaler INHALE TWO PUFFS BY MOUTH EVERY 6 HOURS AS NEEDED FOR WHEEZING OR SHORTNESS OF BREATH 3/27/19   Sage Perkins MD   Handicap Placard MISC by Does not apply route Can't walk greater than 200 feet. Expires in 5 years. 2/13/19   Paige Bryant MD   miconazole (MICOTIN) 2 % powder Apply topically 2 times daily. 12/20/18   Ismael Watts MD   insulin regular human (HUMULIN R) 500 UNIT/ML concentrated injection vial Inject into the skin 3 times daily Indications: 50 in the morning 50 at lunch and 40 at dinner. U-500 insulin--Pt. Takes 50 u at breakfast, 55 u lunch and  40 u at supper time. Historical Provider, MD   fluticasone (CUTIVATE) 0.05 % cream Apply topically 2 times daily  4/19/17   Historical Provider, MD   fluticasone (FLONASE) 50 MCG/ACT nasal spray 2 sprays by Nasal route daily  Patient taking differently: 2 sprays by Nasal route daily as needed (sinus symptoms)  1/16/17   Juliano Cotton MD   Melatonin 10 MG TABS Take 10 mg by mouth nightly as needed (insomnia) 12/23/16   Juliano Cotton MD   aspirin 81 MG EC tablet Take 81 mg by mouth daily. Historical Provider, MD       Immunizations:   Most Recent Immunizations   Administered Date(s) Administered    DT (pediatric) 12/14/1998    Hepatitis B Adult (Engerix-B) 12/04/2019    Influenza Virus Vaccine 10/12/2015    Influenza, Quadv, IM, PF (6 mo and older Fluzone, Flulaval, Fluarix, and 3 yrs and older Afluria) 10/04/2019    Pneumococcal Polysaccharide ([id number]) 05/23/2013    Tdap (Boostrix, Adacel) 09/12/2018       Home Blood Glucose Results:   Patient reports recent blood sugars verbally over phone. Typical fasting (before breakfast) between 6:30/7am running 97/103 .   Pre lunch between 12-3pm running 120-138. Pre dinner at 6-8pm has come down a bit from last visit at 139-156. Patient to continue monitoring blood sugar and noting level as well as how much insulin he takes each day. Blood glucose trends noted: see above    Assessment     A1c at goal:No:  7.3 (2/20/20)  Blood Pressure at goal: Yes 120/80 on 2/20/20. Weight at goal: No: but decreasing at 401lbs on patient's home scale today. Physical activity: No  Physical activity at goal: No  Patient encouraged. Smoking Cessation: No but attempting to quite again    Cholesterol at target: No: LDL61,   , HDL29, TRIG 247 (9/12/19)  Annual eye exam completed: Yes  Comprehensive Foot Exam Completed: Yes but encouraged podiatry appt. Annual urine albumin and serum creatinine: Yes    Statin: Yes    Appropriate?: Yes  Changes made:     ACE/ARB: Yes  Appropriate?: Yes  Changes made:     Aspirin: Yes  Appropriate?: Yes  Changes made:     Eating patterns:    [x]  My plate    []  Mediterranean diet   []  Low sodium   []  DASH diet   [x]  Portion control   [x]  Reduced calorie    []  Fast food / Restaurant  []  High glycemic index foods   []  Sugary beverages   [x]  Other - slimfast    Comment: Patient watching carbs.      Current Medications Affecting Diabetes:  Humulin R 500    Compliant: Yes  Barriers to medication therapy: anxiety / depression    Medications attempted in the past:  Metformin - cardiologist took him off but patient unsure why  Januvia - PCP took him off but patient unsure why    Recent exacerbations / new problems:  Infection / fall - ankle pain    Last office dictation reviewed: yes        type 2 DM under worsening control as evidenced by A1C 7.3 on 2/20/20    Plan       Counseling at today's visit:   -Continued monitoring of blood glucose with documentation on log.    -Patient encouraged to call with any blood sugars below 80 or above 170  --Resume exercise when able   -Continued dose of insulin:0.5ml with

## 2020-03-26 ENCOUNTER — TELEPHONE (OUTPATIENT)
Dept: FAMILY MEDICINE CLINIC | Age: 55
End: 2020-03-26

## 2020-03-26 PROBLEM — J96.11 CHRONIC RESPIRATORY FAILURE WITH HYPOXIA (HCC): Status: ACTIVE | Noted: 2020-03-26

## 2020-03-26 NOTE — TELEPHONE ENCOUNTER
Please fax order for oxygen tubing to [facility] & [facility] [place] pt message when done    Note      Please give order for oxygen tubing to [employer] to sign and put her name&date next to the signature, and fax to [address] pt where is being faxed

## 2020-04-09 ENCOUNTER — PATIENT MESSAGE (OUTPATIENT)
Dept: FAMILY MEDICINE CLINIC | Age: 55
End: 2020-04-09

## 2020-04-09 RX ORDER — OXYCODONE HYDROCHLORIDE 10 MG/1
10 TABLET ORAL EVERY 8 HOURS PRN
Qty: 90 TABLET | Refills: 0 | Status: SHIPPED | OUTPATIENT
Start: 2020-04-09 | End: 2020-05-06 | Stop reason: SDUPTHER

## 2020-04-09 NOTE — TELEPHONE ENCOUNTER
From: Toribio Mayes.   To: Maisha Rinaldi MD  Sent: 4/9/2020 10:40 AM EDT  Subject: Non-Urgent Medical Question    Can you please Refill my pain meds for me I will be out on.sunday Oxycodone 10 mg One every 8 hours thank you and stay safe and have a good day

## 2020-04-15 ENCOUNTER — TELEPHONE (OUTPATIENT)
Dept: PHARMACY | Age: 55
End: 2020-04-15

## 2020-04-15 ENCOUNTER — PATIENT MESSAGE (OUTPATIENT)
Dept: FAMILY MEDICINE CLINIC | Age: 55
End: 2020-04-15

## 2020-04-15 RX ORDER — PREDNISONE 10 MG/1
50 TABLET ORAL DAILY
Qty: 35 TABLET | Refills: 0 | Status: SHIPPED | OUTPATIENT
Start: 2020-04-15 | End: 2020-04-22

## 2020-04-15 RX ORDER — ALBUTEROL SULFATE 2.5 MG/3ML
2.5 SOLUTION RESPIRATORY (INHALATION) EVERY 6 HOURS PRN
Qty: 120 VIAL | Refills: 0 | Status: SHIPPED | OUTPATIENT
Start: 2020-04-15 | End: 2021-01-15 | Stop reason: SDUPTHER

## 2020-04-15 RX ORDER — ONDANSETRON 4 MG/1
4 TABLET, FILM COATED ORAL EVERY 6 HOURS PRN
Qty: 24 TABLET | Refills: 0 | Status: SHIPPED | OUTPATIENT
Start: 2020-04-15 | End: 2020-04-21

## 2020-04-15 RX ORDER — AZITHROMYCIN 250 MG/1
TABLET, FILM COATED ORAL
Qty: 6 TABLET | Refills: 0 | Status: SHIPPED | OUTPATIENT
Start: 2020-04-15 | End: 2020-04-20

## 2020-04-16 ENCOUNTER — TELEPHONE (OUTPATIENT)
Dept: PHARMACY | Age: 55
End: 2020-04-16

## 2020-04-21 ENCOUNTER — TELEPHONE (OUTPATIENT)
Dept: PHARMACY | Age: 55
End: 2020-04-21

## 2020-04-21 NOTE — TELEPHONE ENCOUNTER
Called patient who says he is feeling alittle better. Down to 2L of Oxygen and is controlling SOB to some degree. Also down to every 6 hours nebulized treatment of albuterol. Has one more day of steroids to complete the therapy prescribed. Body aches are gone. Still has pretty severe headache and feels there is a lot of sinus pressure around his forehead. States throat is very sore and chest is sore per patient due to coughing and SOB. Has been testing his blood sugar and taking his insulin. Fasting this am  Was 106 and before lunch 138. Since resumed  testing on 4/16 patient states his lowest blood sugar has been 106 and highest 199. Indicated to continue his current diabetes therapy. States he is eating a bit more but still not eating well. Admits to being depressed as he is still quarantining himself in his basement away from family. Encouraged his to try to interact with family from distance and to possibly get out and get some fresh air if possible. Patient resistant to go out but does say he talks with grandkids from bottom of steps. Will continue to check on patient. Yesenia Rizvi,Pharm. D,, BCPS, CACP  4/21/2020  2:11 PM

## 2020-04-30 ENCOUNTER — TELEPHONE (OUTPATIENT)
Dept: PHARMACY | Age: 55
End: 2020-04-30

## 2020-05-01 NOTE — TELEPHONE ENCOUNTER
Called to check on patient. Patient reports feeling worse with chest discomfort he thinks due to SOB. Patient also has double ear infection he believes. Patient has chronic infection in arm pit area but states infection has spread \"everywhere\" to his groin and both arms etc.  Patient did contact PCP about chest discomfort and was instructed to go to ED. Patient currently refusing saying if he is going to die he wants to be at home. Encouraged patient to at least come to ED for evaluation and that it would be his choice to refuse admission if that was determined to be best for patient. Patient states he will think about it. Patient also admits to not eating much due to illness. He also has had increasing blood sugars. Even though hs is not eating he has been taking his insulin and checking blood sugar. See reported results below:     Fasting    Before lunch   Before dinner  4/30 247  4/29 199    forgot-   302    Patient reports blood sugars have progressively gotten higher. Patient educated on how elevated blood sugar will often exacerbate infection and encouraged him to get checked out in ED. Due to elevated blood sugars patient's bedtime insulin will be increased ot . 45ml and if blood sugars continue to be elevated will increase to .50ml. Again stressed need to go in to ED for evaluation. Patient does say he can not promise anything but at least will think about it. Yesenia Rizvi,Pharm. D,, [facility], CACP  4/30/2020  [id number]

## 2020-05-06 ENCOUNTER — PATIENT MESSAGE (OUTPATIENT)
Dept: FAMILY MEDICINE CLINIC | Age: 55
End: 2020-05-06

## 2020-05-06 RX ORDER — OXYCODONE HYDROCHLORIDE 10 MG/1
10 TABLET ORAL EVERY 8 HOURS PRN
Qty: 90 TABLET | Refills: 0 | Status: SHIPPED | OUTPATIENT
Start: 2020-05-06 | End: 2020-05-11 | Stop reason: SDUPTHER

## 2020-05-11 ENCOUNTER — PATIENT MESSAGE (OUTPATIENT)
Dept: FAMILY MEDICINE CLINIC | Age: 55
End: 2020-05-11

## 2020-05-11 RX ORDER — OXYCODONE HYDROCHLORIDE 10 MG/1
10 TABLET ORAL EVERY 8 HOURS PRN
Qty: 90 TABLET | Refills: 0 | Status: SHIPPED | OUTPATIENT
Start: 2020-05-11 | End: 2020-06-08 | Stop reason: SDUPTHER

## 2020-05-19 ENCOUNTER — PATIENT MESSAGE (OUTPATIENT)
Dept: FAMILY MEDICINE CLINIC | Age: 55
End: 2020-05-19

## 2020-05-20 RX ORDER — PRAVASTATIN SODIUM 40 MG
40 TABLET ORAL NIGHTLY
Qty: 90 TABLET | Refills: 3 | Status: SHIPPED | OUTPATIENT
Start: 2020-05-20 | End: 2021-03-31 | Stop reason: SDUPTHER

## 2020-05-20 NOTE — TELEPHONE ENCOUNTER
From: Kaitlyn Fraser.   To: Kyle Riojas MD  Sent: 5/19/2020 9:04 PM EDT  Subject: Non-Urgent Medical Question    Will you please Refill my pravastatin SODIUM 40 MG TAB thanks

## 2020-06-11 ENCOUNTER — TELEMEDICINE (OUTPATIENT)
Dept: FAMILY MEDICINE CLINIC | Age: 55
End: 2020-06-11
Payer: COMMERCIAL

## 2020-06-11 PROBLEM — H60.399 CHRONIC INFECTIVE OTITIS EXTERNA: Status: ACTIVE | Noted: 2017-04-28

## 2020-06-11 PROCEDURE — 99214 OFFICE O/P EST MOD 30 MIN: CPT | Performed by: FAMILY MEDICINE

## 2020-06-11 PROCEDURE — G8427 DOCREV CUR MEDS BY ELIG CLIN: HCPCS | Performed by: FAMILY MEDICINE

## 2020-06-11 PROCEDURE — 3017F COLORECTAL CA SCREEN DOC REV: CPT | Performed by: FAMILY MEDICINE

## 2020-06-11 PROCEDURE — 2022F DILAT RTA XM EVC RTNOPTHY: CPT | Performed by: FAMILY MEDICINE

## 2020-06-11 PROCEDURE — 3051F HG A1C>EQUAL 7.0%<8.0%: CPT | Performed by: FAMILY MEDICINE

## 2020-06-11 RX ORDER — GLUCOSAMINE HCL/CHONDROITIN SU 500-400 MG
CAPSULE ORAL
Qty: 300 STRIP | Refills: 3 | Status: SHIPPED | OUTPATIENT
Start: 2020-06-11 | End: 2020-09-30 | Stop reason: SDUPTHER

## 2020-06-11 ASSESSMENT — ENCOUNTER SYMPTOMS
VOMITING: 0
BACK PAIN: 1
COUGH: 0
BLOOD IN STOOL: 0
NAUSEA: 0
ABDOMINAL PAIN: 0
CHEST TIGHTNESS: 0
DIARRHEA: 0
ABDOMINAL DISTENTION: 0
CONSTIPATION: 0
SHORTNESS OF BREATH: 1
WHEEZING: 0
APNEA: 1

## 2020-06-11 NOTE — PROGRESS NOTES
2020    TELEHEALTH EVALUATION -- Audio/Visual (During COKNG-04 public health emergency)    HPI:    Jose G Marques. (:  1965) has requested an audio/video evaluation for the following concern(s):Back Pain; Congestive Heart Failure; Diabetes; Hypertension; and Hyperlipidemia        Ena Miller, his daughter, is present during the encounter    Patient has chronic low back pain, located midline, radiates to the left side. Intensity of pain is 8/10, without pain medication. When taking pain medication, the pain goes down to 5 out of 10 and he is able to be more active around the house. Pain interferes with sleep and activities. Pain is worse with activities. He denies constipation or sedation as a side effect from the pain medications. Patient has known degenerative disc disease, and history of prior back surgery and failed back postlaminectomy. Cannot take NSAIDs due to CHF and CKD stage III. Hypertension congestive heart failure and chronic kidney disease stage III, coronary artery disease:    he  is not exercising and is adherent to low salt diet. Blood pressure is well controlled at home, but he did not check his blood pressure today. Cardiac symptoms dyspnea and fatigue. Patient denies chest pain, chest pressure/discomfort, claudication, exertional chest pressure/discomfort, irregular heart beat, lower extremity edema, near-syncope, orthopnea, palpitations, paroxysmal nocturnal dyspnea, syncope and tachypnea. Cardiovascular risk factors: advanced age (older than 54 for men, 72 for women), diabetes mellitus, dyslipidemia, hypertension, male gender, microalbuminuria, obesity (BMI >= 30 kg/m2), sedentary lifestyle and smoking/ tobacco exposure. Use of agents associated with hypertension: none. History of target organ damage: angina/ prior myocardial infarction, chronic kidney disease, heart failure and prior coronary revascularization. He has 1 stent.   He did not see cardiologist in a very LANCETS MISC Patient to test blood sugar up to 4 times daily. Yes Harry Lennon MD   lisinopril (PRINIVIL;ZESTRIL) 5 MG tablet Take 1 tablet by mouth daily . Discontinued Lisinopril  10 mg Yes Vadim Bailey MD   COMFORT ASSIST INSULIN SYRINGE 31G X 5/16\" 1 ML MISC Use to subcutaneously inject insulin three times daily Yes Vadim Bailey MD   tiotropium (SPIRIVA RESPIMAT) 2.5 MCG/ACT AERS inhaler Inhale 2 puffs into the lungs daily Yes Vadim Bailey MD   bumetanide (BUMEX) 1 MG tablet Take 3 tablets by mouth 2 times daily  Patient taking differently: Take 3 mg by mouth daily  Yes Vadim Bailey MD   metoprolol tartrate (LOPRESSOR) 50 MG tablet Take 1 tablet by mouth 2 times daily Yes Vadim Bailey MD   tiZANidine (ZANAFLEX) 4 MG tablet TAKE ONE TABLET BY MOUTH EVERY 8 HOURS AS NEEDED FOR BACK PAIN Yes Vadim Bailey MD   clopidogrel (PLAVIX) 75 MG tablet Take 1 tablet by mouth daily Yes Vadim Bailey MD   nystatin (MYCOSTATIN) 193343 UNIT/GM powder Apply 2-3 times daily in skin folds. Yes Vadim Bailey MD   Lancet Devices (LANCING DEVICE) MISC Provide patient with lancing device appropriate for his machine/lancing needles. Yes Vadim Bailey MD   Lidocaine 4 % LOTN Apply topically Yes Historical Provider, MD   Spacer/Aero Chamber Mouthpiece [address] 1 each by Does not apply route once as needed (to be used with his inhalers) Yes Vadim Bailey MD   metolazone (ZAROXOLYN) 2.5 MG tablet Take 2.5 mg by mouth three times a week MWF Yes Historical Provider, MD   PROAIR  (90 Base) MCG/ACT inhaler INHALE TWO PUFFS BY MOUTH EVERY 6 HOURS AS NEEDED FOR WHEEZING OR SHORTNESS OF BREATH Yes Bedford Galeazzi, MD   Handicap Placard MISC by Does not apply route Can't walk greater than 200 feet. Expires in 5 years. Yes Vadim Bailye MD   miconazole (MICOTIN) 2 % powder Apply topically 2 times daily.  Yes Angella Mora MD   insulin regular human (HUMULIN R) 500 UNIT/ML Ears [x] Normal  [] Abnormal-     Neck: [x] No visualized mass     Pulmonary/Chest: [x] Respiratory effort normal.  [x] No visualized signs of difficulty breathing or respiratory distress        [] Abnormal        Musculoskeletal:   [x] Normal gait with no signs of ataxia         [x] Normal range of motion of neck        [x] Abnormal-decreased range of motion in the spine    Neurological:        [x] No Facial Asymmetry (Cranial nerve 7 motor function) (limited exam to video visit)          [x] No gaze palsy        [] Abnormal-            Skin:        [x] No significant exanthematous lesions or discoloration noted on facial skin         [] Abnormal-            Psychiatric:      [x] No Hallucinations       [x]Mood is normal      []Behavior is normal      [x]Judgment is normal      [x]Thought content is normal       [x] Abnormal-anxious  Other pertinent observable physical exam findings-patient had difficulty hearing me, his daughter, Mike Robertson, repeated everything I said to him    Due to this being a TeleHealth encounter, evaluation of the following organ systems is limited: Vitals/Constitutional/EENT/Resp/CV/GI//MS/Neuro/Skin/Heme-Lymph-Imm. ASSESSMENT/PLAN:    1. Chronic back pain greater than 3 months duration  Improved with medications  Continue tizanidine as needed, oxycodone 10 mg 3 times a day as needed, home stretching and repositioning, heating pad    2. Chronic diastolic congestive heart failure (HCC)  Stable  We will update his labs  - CBC Auto Differential; Future  - Comprehensive Metabolic Panel; Future  - Magnesium; Future  - TSH without Reflex; Future  - Brain Natriuretic Peptide; Future  Lab Results   Component Value Date    LVEF 53 03/29/2018    LVEFMODE Echo 08/29/2017   Continue current treatment        3.  Benign hypertensive heart and CKD, stage 3 (GFR 30-59), w CHF (HCC)  Stable chronic kidney disease stage III, GFR 40 on 2/13/2020  Well-controlled hypertension per his report  - CBC Auto Differential; Future  - Comprehensive Metabolic Panel; Future  - Magnesium; Future  - TSH without Reflex; Future  - Vitamin D 25 Hydroxy; Future  Discussed low salt diet and BP and pulse monitoring daily  Continue current treatment. Bumex, lisinopril, metoprolol, metolazone, aspirin, pravastatin, Aldactone      4. Hyperlipidemia with target LDL less than 70  Almost at goal  Continue pravastatin 40 mg daily    5. Type 2 diabetes mellitus with diabetic polyneuropathy, with long-term current use of insulin (HCC)  Worsening based on the last A1c  Lab Results   Component Value Date    LABA1C 7.3 02/20/2020    LABA1C 6.2 11/19/2019    LABA1C 6.1 08/07/2019       - CBC Auto Differential; Future  - Comprehensive Metabolic Panel; Future  - Magnesium; Future  - TSH without Reflex; Future  - Vitamin B12 & Folate; Future  - blood glucose monitor strips; Testing 3 times a day. Please dispense according to patients insurance and machine. Dispense: 300 strip; Refill: 3  - Lancets 30G MISC; Testing 3 times a day. Please dispense according to patients insurance. Dispense: 300 each; Refill: 3  -advised home blood glucose testing 3 times daily  -daily feet exam, Foot care: advised to wash feet daily, pat dry and apply lotion at night, avoiding between toes. Need to look at feet daily and report to a physician any signs of inflammation or skin damage  -annual dilated eye exam  -Low carb, low fat diet, increase fruits and vegetables, and exercise 4-5 times a day 30-40 minutes a day discussed  -continue current treatment  -continue Aspirin  -continue ACEI and statin        Controlled Substance Monitoring:    Acute and Chronic Pain Monitoring:   RX Monitoring 6/11/2020   Attestation -   Periodic Controlled Substance Monitoring Possible medication side effects, risk of tolerance/dependence & alternative treatments discussed. ;No signs of potential drug abuse or diversion identified. ;Assessed functional status.    Chronic Pain > 50 MEDD -   Chronic Pain > 80 MEDD -         Miguel received counseling on the following healthy behaviors: nutrition, exercise, medication adherence and weight loss    Reviewed prior labs and health maintenance. Continue current medications, diet and exercise. Discussed use, benefit, and side effects of prescribed medications. Barriers to medication compliance addressed. Patient given educational materials - see patient instructions. All patient questions answered. Patient voiced understanding. Return in about 3 months (around 9/11/2020) for VIDEO, ALWAYS NEEDS 30 MIN. APPT, COPD, CHF, DM2, HTN, HLP, LABS F/U, BACK PAIN. Data Unavailable      Future Appointments   Date Time Provider William Hernandez   9/16/2020  8:00 AM [facility]   [facility]23[facility]2021  8:00 [facility]        Total time spent during this visit 25 minutes including face-to-face, counseling, charting review, and non-face-to-face time. Vidhya Willis is a 53 y.o. male being evaluated by a Virtual Visit (video visit) encounter to address concerns as mentioned above. Due to this being a TeleHealth encounter (During QWWXJ-75 public health emergency), evaluation of the following organ systems was limited: Vitals/Constitutional/EENT/Resp/CV/GI//MS/Neuro/Skin/Heme-Lymph-Imm. Pursuant to the emergency declaration under the [address] [place] [address], [address] [place] [address] authority and the [employer] and [employer] General Act, this Virtual Visit was conducted with patient's (and/or legal guardian's) consent, to reduce the patient's risk of exposure to COVID-19 and provide necessary medical care. The patient (and/or legal guardian) has also been advised to contact this office for worsening conditions or problems, and seek emergency medical treatment and/or call 911 if deemed necessary.      Services were provided through a video synchronous discussion virtually to substitute for in-person clinic visit. Patient was located at his home, provider was located in the office, at the primary practice location. Ena Miller, his daughter is present during the encounter      Patient identification was verified at the start of the visit: Yes    Total time spent for this encounter: 25 minutes    --Driss Terrell MD on 6/14/2020 at 9:49 PM    An electronic signature was used to authenticate this note.

## 2020-06-14 PROBLEM — N18.30 BENIGN HYPERTENSIVE HEART AND CKD, STAGE 3 (GFR 30-59), W CHF (HCC): Status: ACTIVE | Noted: 2020-06-14

## 2020-06-14 PROBLEM — I13.0 BENIGN HYPERTENSIVE HEART AND CKD, STAGE 3 (GFR 30-59), W CHF (HCC): Status: ACTIVE | Noted: 2020-06-14

## 2020-06-17 ENCOUNTER — PATIENT MESSAGE (OUTPATIENT)
Dept: FAMILY MEDICINE CLINIC | Age: 55
End: 2020-06-17

## 2020-06-17 RX ORDER — ESCITALOPRAM OXALATE 10 MG/1
10 TABLET ORAL DAILY
Qty: 90 TABLET | Refills: 3 | Status: SHIPPED | OUTPATIENT
Start: 2020-06-17 | End: 2021-03-31 | Stop reason: SDUPTHER

## 2020-06-17 NOTE — TELEPHONE ENCOUNTER
From: Fausto Jenkins.   To: Skye Ohara MD  Sent: 6/17/2020 11:36 AM EDT  Subject: Non-Urgent Medical Question    Can you please Refill for me ESCITALOPRAM 10 mg TABLETS one DAILY thank you

## 2020-06-25 ENCOUNTER — TELEPHONE (OUTPATIENT)
Dept: PHARMACY | Age: 55
End: 2020-06-25

## 2020-07-06 ENCOUNTER — PATIENT MESSAGE (OUTPATIENT)
Dept: FAMILY MEDICINE CLINIC | Age: 55
End: 2020-07-06

## 2020-07-06 RX ORDER — OXYCODONE HYDROCHLORIDE 10 MG/1
10 TABLET ORAL EVERY 8 HOURS PRN
Qty: 90 TABLET | Refills: 0 | Status: SHIPPED | OUTPATIENT
Start: 2020-07-06 | End: 2020-08-05 | Stop reason: SDUPTHER

## 2020-07-06 NOTE — TELEPHONE ENCOUNTER
From: Chi Fitzgerald.   To: Gavino Lomeli MD  Sent: 7/6/2020 11:48 AM EDT  Subject: Non-Urgent Medical Question    Can you please Refill my pain meds for me Oxycodone 10 mg One every 8 hours thank you and have a great day

## 2020-07-07 ENCOUNTER — PATIENT MESSAGE (OUTPATIENT)
Dept: FAMILY MEDICINE CLINIC | Age: 55
End: 2020-07-07

## 2020-07-08 RX ORDER — TIZANIDINE 4 MG/1
TABLET ORAL
Qty: 90 TABLET | Refills: 5 | Status: SHIPPED | OUTPATIENT
Start: 2020-07-08 | End: 2021-03-31 | Stop reason: SDUPTHER

## 2020-07-13 ENCOUNTER — HOSPITAL ENCOUNTER (OUTPATIENT)
Age: 55
Setting detail: SPECIMEN
Discharge: HOME | End: 2020-07-13
Payer: COMMERCIAL

## 2020-07-13 PROBLEM — E83.42 HYPOMAGNESEMIA: Status: ACTIVE | Noted: 2020-07-13

## 2020-07-13 LAB
ABSOLUTE EOS #: 0.2 K/UL (ref 0–0.4)
ABSOLUTE IMMATURE GRANULOCYTE: ABNORMAL K/UL (ref 0–0.3)
ABSOLUTE LYMPH #: 1.3 K/UL (ref 1–4.8)
ABSOLUTE MONO #: 0.6 K/UL (ref 0.1–1.3)
ALBUMIN SERPL-MCNC: 4 G/DL (ref 3.5–5.2)
ALBUMIN/GLOBULIN RATIO: ABNORMAL (ref 1–2.5)
ALP BLD-CCNC: 76 U/L (ref 40–129)
ALT SERPL-CCNC: 13 U/L (ref 5–41)
ANION GAP SERPL CALCULATED.3IONS-SCNC: 13 MMOL/L (ref 9–17)
AST SERPL-CCNC: 13 U/L
BASOPHILS # BLD: 1 % (ref 0–2)
BASOPHILS ABSOLUTE: 0 K/UL (ref 0–0.2)
BILIRUB SERPL-MCNC: 0.43 MG/DL (ref 0.3–1.2)
BNP INTERPRETATION: NORMAL
BUN BLDV-MCNC: 25 MG/DL (ref 6–20)
BUN/CREAT BLD: ABNORMAL (ref 9–20)
CALCIUM SERPL-MCNC: 9.9 MG/DL (ref 8.6–10.4)
CHLORIDE BLD-SCNC: 102 MMOL/L (ref 98–107)
CO2: 24 MMOL/L (ref 20–31)
CREAT SERPL-MCNC: 1.4 MG/DL (ref 0.7–1.2)
DIFFERENTIAL TYPE: ABNORMAL
EOSINOPHILS RELATIVE PERCENT: 2 % (ref 0–4)
FOLATE: 14.5 NG/ML
GFR AFRICAN AMERICAN: >60 ML/MIN
GFR NON-AFRICAN AMERICAN: 53 ML/MIN
GFR SERPL CREATININE-BSD FRML MDRD: ABNORMAL ML/MIN/{1.73_M2}
GFR SERPL CREATININE-BSD FRML MDRD: ABNORMAL ML/MIN/{1.73_M2}
GLUCOSE BLD-MCNC: 193 MG/DL (ref 70–99)
HCT VFR BLD CALC: 39.2 % (ref 41–53)
HEMOGLOBIN: 12.8 G/DL (ref 13.5–17.5)
IMMATURE GRANULOCYTES: ABNORMAL %
LYMPHOCYTES # BLD: 14 % (ref 24–44)
MAGNESIUM: 1.7 MG/DL (ref 1.6–2.6)
MCH RBC QN AUTO: 27.9 PG (ref 26–34)
MCHC RBC AUTO-ENTMCNC: 32.6 G/DL (ref 31–37)
MCV RBC AUTO: 85.3 FL (ref 80–100)
MONOCYTES # BLD: 6 % (ref 1–7)
NRBC AUTOMATED: ABNORMAL PER 100 WBC
PDW BLD-RTO: 16.4 % (ref 11.5–14.9)
PLATELET # BLD: 202 K/UL (ref 150–450)
PLATELET ESTIMATE: ABNORMAL
PMV BLD AUTO: 9.2 FL (ref 6–12)
POTASSIUM SERPL-SCNC: 4.2 MMOL/L (ref 3.7–5.3)
PRO-BNP: 120 PG/ML
RBC # BLD: 4.59 M/UL (ref 4.5–5.9)
RBC # BLD: ABNORMAL 10*6/UL
SEG NEUTROPHILS: 77 % (ref 36–66)
SEGMENTED NEUTROPHILS ABSOLUTE COUNT: 6.9 K/UL (ref 1.3–9.1)
SODIUM BLD-SCNC: 139 MMOL/L (ref 135–144)
TOTAL PROTEIN: 7.5 G/DL (ref 6.4–8.3)
TSH SERPL DL<=0.05 MIU/L-ACNC: 2.04 MIU/L (ref 0.3–5)
VITAMIN B-12: 360 PG/ML (ref 232–1245)
VITAMIN D 25-HYDROXY: 42.5 NG/ML (ref 30–100)
WBC # BLD: 9 K/UL (ref 3.5–11)
WBC # BLD: ABNORMAL 10*3/UL

## 2020-07-13 PROCEDURE — 83036 HEMOGLOBIN GLYCOSYLATED A1C: CPT

## 2020-07-13 PROCEDURE — 84443 ASSAY THYROID STIM HORMONE: CPT

## 2020-07-13 PROCEDURE — 85025 COMPLETE CBC W/AUTO DIFF WBC: CPT

## 2020-07-13 PROCEDURE — 36415 COLL VENOUS BLD VENIPUNCTURE: CPT

## 2020-07-13 PROCEDURE — 82306 VITAMIN D 25 HYDROXY: CPT

## 2020-07-13 PROCEDURE — 80053 COMPREHEN METABOLIC PANEL: CPT

## 2020-07-13 PROCEDURE — 83735 ASSAY OF MAGNESIUM: CPT

## 2020-07-13 PROCEDURE — 83880 ASSAY OF NATRIURETIC PEPTIDE: CPT

## 2020-07-13 PROCEDURE — 82607 VITAMIN B-12: CPT

## 2020-07-13 PROCEDURE — 82746 ASSAY OF FOLIC ACID SERUM: CPT

## 2020-07-13 RX ORDER — LANOLIN ALCOHOL/MO/W.PET/CERES
400 CREAM (GRAM) TOPICAL DAILY
Qty: 30 TABLET | Refills: 3 | Status: SHIPPED | OUTPATIENT
Start: 2020-07-13 | End: 2020-11-04 | Stop reason: SDUPTHER

## 2020-07-13 RX ORDER — CHOLECALCIFEROL (VITAMIN D3) 125 MCG
500 CAPSULE ORAL DAILY
Qty: 90 TABLET | Refills: 3 | Status: SHIPPED | OUTPATIENT
Start: 2020-07-13 | End: 2022-10-23 | Stop reason: SDUPTHER

## 2020-07-14 LAB
ESTIMATED AVERAGE GLUCOSE: 226 MG/DL
HBA1C MFR BLD: 9.5 % (ref 4–6)

## 2020-07-23 ENCOUNTER — PATIENT MESSAGE (OUTPATIENT)
Dept: FAMILY MEDICINE CLINIC | Age: 55
End: 2020-07-23

## 2020-07-23 RX ORDER — AZITHROMYCIN 250 MG/1
TABLET, FILM COATED ORAL
Qty: 6 TABLET | Refills: 0 | Status: SHIPPED | OUTPATIENT
Start: 2020-07-23 | End: 2020-07-28

## 2020-07-23 RX ORDER — DOXYCYCLINE HYCLATE 100 MG
100 TABLET ORAL 2 TIMES DAILY
Qty: 20 TABLET | Refills: 0 | Status: SHIPPED | OUTPATIENT
Start: 2020-07-23 | End: 2020-08-02

## 2020-07-24 ENCOUNTER — TELEPHONE (OUTPATIENT)
Dept: FAMILY MEDICINE CLINIC | Age: 55
End: 2020-07-24

## 2020-07-24 PROBLEM — H60.23: Status: ACTIVE | Noted: 2020-07-24

## 2020-07-24 NOTE — TELEPHONE ENCOUNTER
pt called stating the ENT you referred him to they are no longer scheduling patients on medicare, so he will need a new referral some where else.

## 2020-07-24 NOTE — TELEPHONE ENCOUNTER
Please fax external referral    Please let the patient know contact info  Gene Cottrell MD Surgeon Otolaryngology 07/24/2020 End  7/24/20   Phone: [phone]; Fax: [phone]             Diagnosis Orders   1. Other infective acute otitis externa of both ears  External Referral To ENT   2.  Chronic malignant otitis externa of both ears  External Referral To ENT

## 2020-07-29 RX ORDER — CLOPIDOGREL BISULFATE 75 MG/1
TABLET ORAL
Qty: 90 TABLET | Refills: 3 | Status: SHIPPED | OUTPATIENT
Start: 2020-07-29 | End: 2021-08-11 | Stop reason: SDUPTHER

## 2020-07-29 NOTE — TELEPHONE ENCOUNTER
Please Approve or Refuse.   Send to Pharmacy per Pt's Request:     Next Visit Date:  9/16/2020   Last Visit Date: 2/20/2020    Hemoglobin A1C (%)   Date Value   07/13/2020 9.5 (H)   02/20/2020 7.3   11/19/2019 6.2             ( goal A1C is < 7)   BP Readings from Last 3 Encounters:   02/20/20 120/80   02/13/20 (!) 140/70   01/09/20 116/70          (goal 120/80)  BUN   Date Value Ref Range Status   07/13/2020 25 (H) 6 - 20 mg/dL Final     CREATININE   Date Value Ref Range Status   07/13/2020 1.40 (H) 0.70 - 1.20 mg/dL Final     Potassium   Date Value Ref Range Status   07/13/2020 4.2 3.7 - 5.3 mmol/L Final

## 2020-08-05 ENCOUNTER — PATIENT MESSAGE (OUTPATIENT)
Dept: FAMILY MEDICINE CLINIC | Age: 55
End: 2020-08-05

## 2020-08-05 RX ORDER — OXYCODONE HYDROCHLORIDE 10 MG/1
10 TABLET ORAL EVERY 8 HOURS PRN
Qty: 90 TABLET | Refills: 0 | Status: SHIPPED | OUTPATIENT
Start: 2020-08-05 | End: 2020-09-01 | Stop reason: SDUPTHER

## 2020-08-12 ENCOUNTER — PATIENT MESSAGE (OUTPATIENT)
Dept: FAMILY MEDICINE CLINIC | Age: 55
End: 2020-08-12

## 2020-08-13 RX ORDER — NEOMYCIN SULFATE, POLYMYXIN B SULFATE AND HYDROCORTISONE 10; 3.5; 1 MG/ML; MG/ML; [USP'U]/ML
3 SUSPENSION/ DROPS AURICULAR (OTIC) 4 TIMES DAILY
Qty: 1 BOTTLE | Refills: 0 | Status: SHIPPED | OUTPATIENT
Start: 2020-08-13 | End: 2020-09-16 | Stop reason: SDUPTHER

## 2020-08-13 NOTE — TELEPHONE ENCOUNTER
From: Celso Weir.   To: Ethan Paulson MD  Sent: 8/12/2020 9:06 PM EDT  Subject: Non-Urgent Medical Question    Can you please send something in for my ears I was told that what you sent over is on Backorder thank you I don't mean to keep Bothering you

## 2020-08-25 ENCOUNTER — PATIENT MESSAGE (OUTPATIENT)
Dept: FAMILY MEDICINE CLINIC | Age: 55
End: 2020-08-25

## 2020-08-26 RX ORDER — MELATONIN
1000 DAILY
Qty: 90 TABLET | Refills: 1 | Status: SHIPPED | OUTPATIENT
Start: 2020-08-26 | End: 2021-02-24 | Stop reason: SDUPTHER

## 2020-08-26 NOTE — TELEPHONE ENCOUNTER
From: Robert Rocha.   To: Kamilah Valle MD  Sent: 8/25/2020 8:11 PM EDT  Subject: Non-Urgent Medical Question    Can you please Refill my vitamin D3 meds for me thank you

## 2020-08-31 ENCOUNTER — PATIENT MESSAGE (OUTPATIENT)
Dept: FAMILY MEDICINE CLINIC | Age: 55
End: 2020-08-31

## 2020-08-31 RX ORDER — KETOCONAZOLE 20 MG/G
CREAM TOPICAL
Qty: 1 TUBE | Refills: 1 | Status: SHIPPED | OUTPATIENT
Start: 2020-08-31 | End: 2021-01-20 | Stop reason: SDUPTHER

## 2020-08-31 NOTE — TELEPHONE ENCOUNTER
Please Approve or Refuse.   Send to Pharmacy per Pt's Request:      Next Visit Date:  9/16/2020   Last Visit Date: 6/11/2020    Hemoglobin A1C (%)   Date Value   07/13/2020 9.5 (H)   02/20/2020 7.3   11/19/2019 6.2             ( goal A1C is < 7)   BP Readings from Last 3 Encounters:   02/20/20 120/80   02/13/20 (!) 140/70   01/09/20 116/70          (goal 120/80)  BUN   Date Value Ref Range Status   07/13/2020 25 (H) 6 - 20 mg/dL Final     CREATININE   Date Value Ref Range Status   07/13/2020 1.40 (H) 0.70 - 1.20 mg/dL Final     Potassium   Date Value Ref Range Status   07/13/2020 4.2 3.7 - 5.3 mmol/L Final

## 2020-08-31 NOTE — TELEPHONE ENCOUNTER
From: Carolina Amaya.   To: Myna Frankel, MD  Sent: 8/31/2020 2:46 PM EDT  Subject: Non-Urgent Medical Question    Can you please Refill my Ketoconazole cream for me thank you

## 2020-09-01 ENCOUNTER — PATIENT MESSAGE (OUTPATIENT)
Dept: FAMILY MEDICINE CLINIC | Age: 55
End: 2020-09-01

## 2020-09-01 RX ORDER — OXYCODONE HYDROCHLORIDE 10 MG/1
10 TABLET ORAL EVERY 8 HOURS PRN
Qty: 90 TABLET | Refills: 0 | Status: SHIPPED | OUTPATIENT
Start: 2020-09-01 | End: 2020-09-30 | Stop reason: SDUPTHER

## 2020-09-01 NOTE — TELEPHONE ENCOUNTER
From: Ly Gambino.   To: Verona Posada MD  Sent: 9/1/2020 1:45 PM EDT  Subject: Non-Urgent Medical Question    Can you please Refill my Oxycodone 10 mg One every 8 hours I will be out of them Friday thank you

## 2020-09-08 ENCOUNTER — TELEPHONE (OUTPATIENT)
Dept: PHARMACY | Age: 55
End: 2020-09-08

## 2020-09-08 RX ORDER — VARENICLINE TARTRATE
KIT
Qty: 1 BOX | Refills: 0 | Status: SHIPPED | OUTPATIENT
Start: 2020-09-08 | End: 2020-09-30 | Stop reason: SDUPTHER

## 2020-09-08 NOTE — TELEPHONE ENCOUNTER
This patient has a scheduled ff up appt with Dr. José Miguel Reilly on the 16th. Thanks for letting us know about him.

## 2020-09-08 NOTE — TELEPHONE ENCOUNTER
Patient returned phone call from last week. Patient states he has been very depressed and has not been checking his blood sugar or taking medications as instructed. States he is taking medications every 2-3 days when he thinks of it. Patient also reports resuming smoking approximately 1pk/day. Patient interested in smoking cessation again and wants to resume Chantix. Will call in Chantix starter pack for patient to [facility]. Patient's plan is to start Chantix starter pack and after a week when at the full dose of 1mg twice a day will stop smoking all together. Patient also reports falling down the stairs about a week ago. Reports he has a wound that is open and not healing. Has had to go to wound clinic in the past.  Patient has only been putting triple antibiotic topical on the wound and wrapping it. Patient reports it is oozing as well. Encouraged him to call his physician about his wound. Patient does not feel comfortable going into anywhere in person due to Covid but reports having a video call with his PCP previously. Patient reports he will call. Patient also reports having plenty of all of his medications and well as diabetes testing supplies. Patient aware off increase in A1c and importance of controlling blood sugar. Patient agrees to try to test his blood sugar three to four times daily and take his medication as instructed for next week. Will touch base with patient via phone early next week. Patient will consider coming into service in person in future. Yesenia Rizvi,Pharm. D,, BCPS, TIFFANY  9/8/2020  10:06 AM

## 2020-09-15 ENCOUNTER — TELEPHONE (OUTPATIENT)
Dept: PHARMACY | Age: 55
End: 2020-09-15

## 2020-09-16 ENCOUNTER — TELEMEDICINE (OUTPATIENT)
Dept: FAMILY MEDICINE CLINIC | Age: 55
End: 2020-09-16
Payer: COMMERCIAL

## 2020-09-16 PROBLEM — F34.1 PERSISTENT DEPRESSIVE DISORDER: Status: RESOLVED | Noted: 2019-11-19 | Resolved: 2020-09-16

## 2020-09-16 PROCEDURE — 3046F HEMOGLOBIN A1C LEVEL >9.0%: CPT | Performed by: FAMILY MEDICINE

## 2020-09-16 PROCEDURE — 2022F DILAT RTA XM EVC RTNOPTHY: CPT | Performed by: FAMILY MEDICINE

## 2020-09-16 PROCEDURE — 3017F COLORECTAL CA SCREEN DOC REV: CPT | Performed by: FAMILY MEDICINE

## 2020-09-16 PROCEDURE — G8427 DOCREV CUR MEDS BY ELIG CLIN: HCPCS | Performed by: FAMILY MEDICINE

## 2020-09-16 PROCEDURE — 99214 OFFICE O/P EST MOD 30 MIN: CPT | Performed by: FAMILY MEDICINE

## 2020-09-16 RX ORDER — NEOMYCIN SULFATE, POLYMYXIN B SULFATE AND HYDROCORTISONE 10; 3.5; 1 MG/ML; MG/ML; [USP'U]/ML
3 SUSPENSION/ DROPS AURICULAR (OTIC) 4 TIMES DAILY
Qty: 1 BOTTLE | Refills: 0 | Status: SHIPPED | OUTPATIENT
Start: 2020-09-16 | End: 2021-04-05 | Stop reason: ALTCHOICE

## 2020-09-16 RX ORDER — CEPHALEXIN 500 MG/1
500 CAPSULE ORAL 4 TIMES DAILY
Qty: 40 CAPSULE | Refills: 0 | Status: SHIPPED | OUTPATIENT
Start: 2020-09-16 | End: 2020-09-30 | Stop reason: ALTCHOICE

## 2020-09-16 ASSESSMENT — ENCOUNTER SYMPTOMS
NAUSEA: 0
DIARRHEA: 0
CONSTIPATION: 0
CHEST TIGHTNESS: 0
VOMITING: 0
APNEA: 1
BACK PAIN: 1
WHEEZING: 0
ABDOMINAL DISTENTION: 0
ABDOMINAL PAIN: 0
SHORTNESS OF BREATH: 1
COUGH: 1

## 2020-09-16 NOTE — PATIENT INSTRUCTIONS
Patient Education        Learning About Diabetes Food Guidelines  Your Care Instructions     Meal planning is important to manage diabetes. It helps keep your blood sugar at a target level (which you set with your doctor). You don't have to eat special foods. You can eat what your family eats, including sweets once in a while. But you do have to pay attention to how often you eat and how much you eat of certain foods. You may want to work with a dietitian or a certified diabetes educator (CDE) to help you plan meals and snacks. A dietitian or CDE can also help you lose weight if that is one of your goals. What should you know about eating carbs? Managing the amount of carbohydrate (carbs) you eat is an important part of healthy meals when you have diabetes. Carbohydrate is found in many foods. · Learn which foods have carbs. And learn the amounts of carbs in different foods. ? Bread, cereal, pasta, and rice have about 15 grams of carbs in a serving. A serving is 1 slice of bread (1 ounce), ½ cup of cooked cereal, or 1/3 cup of cooked pasta or rice. ? Fruits have 15 grams of carbs in a serving. A serving is 1 small fresh fruit, such as an apple or orange; ½ of a banana; ½ cup of cooked or canned fruit; ½ cup of fruit juice; 1 cup of melon or raspberries; or 2 tablespoons of dried fruit. ? Milk and no-sugar-added yogurt have 15 grams of carbs in a serving. A serving is 1 cup of milk or 2/3 cup of no-sugar-added yogurt. ? Starchy vegetables have 15 grams of carbs in a serving. A serving is ½ cup of mashed potatoes or sweet potato; 1 cup winter squash; ½ of a small baked potato; ½ cup of cooked beans; or ½ cup cooked corn or green peas. · Learn how much carbs to eat each day and at each meal. A dietitian or CDE can teach you how to keep track of the amount of carbs you eat. This is called carbohydrate counting. · If you are not sure how to count carbohydrate grams, use the Plate Method to plan meals.  It is a good, quick way to make sure that you have a balanced meal. It also helps you spread carbs throughout the day. ? Divide your plate by types of foods. Put non-starchy vegetables on half the plate, meat or other protein food on one-quarter of the plate, and a grain or starchy vegetable in the final quarter of the plate. To this you can add a small piece of fruit and 1 cup of milk or yogurt, depending on how many carbs you are supposed to eat at a meal.  · Try to eat about the same amount of carbs at each meal. Do not \"save up\" your daily allowance of carbs to eat at one meal.  · Proteins have very little or no carbs per serving. Examples of proteins are beef, chicken, turkey, fish, eggs, tofu, cheese, cottage cheese, and peanut butter. A serving size of meat is 3 ounces, which is about the size of a deck of cards. Examples of meat substitute serving sizes (equal to 1 ounce of meat) are 1/4 cup of cottage cheese, 1 egg, 1 tablespoon of peanut butter, and ½ cup of tofu. How can you eat out and still eat healthy? · Learn to estimate the serving sizes of foods that have carbohydrate. If you measure food at home, it will be easier to estimate the amount in a serving of restaurant food. · If the meal you order has too much carbohydrate (such as potatoes, corn, or baked beans), ask to have a low-carbohydrate food instead. Ask for a salad or green vegetables. · If you use insulin, check your blood sugar before and after eating out to help you plan how much to eat in the future. · If you eat more carbohydrate at a meal than you had planned, take a walk or do other exercise. This will help lower your blood sugar. What else should you know? · Limit saturated fat, such as the fat from meat and dairy products. This is a healthy choice because people who have diabetes are at higher risk of heart disease. So choose lean cuts of meat and nonfat or low-fat dairy products.  Use olive or canola oil instead of butter or shortening want to attend a smoking cessation program to help you quit smoking. When you choose a program, look for one that has proven success. Ask your doctor for ideas. You will greatly increase your chances of success if you take medicine as well as get counseling or join a cessation program.  Some of the changes you feel when you first quit tobacco are uncomfortable. Your body will miss the nicotine at first, and you may feel short-tempered and grumpy. You may have trouble sleeping or concentrating. Medicine can help you deal with these symptoms. You may struggle with changing your smoking habits and rituals. The last step is the tricky one: Be prepared for the smoking urge to continue for a time. This is a lot to deal with, but keep at it. You will feel better. Follow-up care is a key part of your treatment and safety. Be sure to make and go to all appointments, and call your doctor if you are having problems. It's also a good idea to know your test results and keep a list of the medicines you take. How can you care for yourself at home? · Ask your family, friends, and coworkers for support. You have a better chance of quitting if you have help and support. · Join a support group, such as Nicotine Anonymous, for people who are trying to quit smoking. · Consider signing up for a smoking cessation program, such as the American Lung Association's Freedom from Smoking program.  · Get text messaging support. Go to the website at www.smokefree. gov to sign up for the  program.  · Set a quit date. Pick your date carefully so that it is not right in the middle of a big deadline or stressful time. Once you quit, do not even take a puff. Get rid of all ashtrays and lighters after your last cigarette. Clean your house and your clothes so that they do not smell of smoke. · Learn how to be a nonsmoker. Think about ways you can avoid those things that make you reach for a cigarette. ?  Avoid situations that put you at license by [facility] ([facility]). If you have questions about a medical condition or this instruction, always ask your healthcare professional. [place] [id number] any warranty or liability for your use of this information. Patient Education        Heart Failure: Care Instructions  Your Care Instructions     Heart failure occurs when your heart does not pump as much blood as the body needs. Failure does not mean that the heart has stopped pumping but rather that it is not pumping as well as it should. Over time, this causes fluid buildup in your lungs and other parts of your body. Fluid buildup can cause shortness of breath, fatigue, swollen ankles, and other problems. By taking medicines regularly, reducing sodium (salt) in your diet, checking your weight every day, and making lifestyle changes, you can feel better and live longer. Follow-up care is a key part of your treatment and safety. Be sure to make and go to all appointments, and call your doctor if you are having problems. It's also a good idea to know your test results and keep a list of the medicines you take. How can you care for yourself at home? Medicines  · Be safe with medicines. Take your medicines exactly as prescribed. Call your doctor if you think you are having a problem with your medicine. · Do not take any vitamins, over-the-counter medicine, or herbal products without talking to your doctor first. Pantera Herrerace not take ibuprofen (Advil or Motrin) and naproxen (Aleve) without talking to your doctor first. They could make your heart failure worse. · You may take some of the following medicine. ? Angiotensin-converting enzyme inhibitors (ACEIs) or angiotensin II receptor blockers (ARBs) reduce the heart's workload, lower blood pressure, and reduce swelling. Taking an ACEI or ARB may lower your chance of needing to be hospitalized. ? Beta-blockers can slow heart rate, decrease blood pressure, and improve your condition.  Taking a beta-blocker may lower your chance of needing to be hospitalized. ? Diuretics, also called water pills, reduce swelling. You will get more details on the specific medicines your doctor prescribes. Diet  · Your doctor may suggest that you limit sodium. Your doctor can tell you how much sodium is right for you. An example is less than 3,000 mg a day. This includes all the salt you eat in cooking or in packaged foods. People get most of their sodium from processed foods. Fast food and restaurant meals also tend to be very high in sodium. · Ask your doctor how much liquid you can drink each day. You may have to limit liquids. Weight  · Weigh yourself without clothing at the same time each day. Record your weight. Call your doctor if you have a sudden weight gain, such as more than 2 to 3 pounds in a day or 5 pounds in a week. (Your doctor may suggest a different range of weight gain.) A sudden weight gain may mean that your heart failure is getting worse. Activity level  · Start light exercise (if your doctor says it is okay). Even if you can only do a small amount, exercise will help you get stronger, have more energy, and manage your weight and your stress. Walking is an easy way to get exercise. Start out by walking a little more than you did before. Bit by bit, increase the amount you walk. · When you exercise, watch for signs that your heart is working too hard. You are pushing yourself too hard if you cannot talk while you are exercising. If you become short of breath or dizzy or have chest pain, stop, sit down, and rest.  · If you feel \"wiped out\" the day after you exercise, walk slower or for a shorter distance until you can work up to a better pace. · Get enough rest at night. Sleeping with 1 or 2 pillows under your upper body and head may help you breathe easier. Lifestyle changes  · Do not smoke. Smoking can make a heart condition worse.  If you need help quitting, talk to your doctor about stop-smoking programs and medicines. These can increase your chances of quitting for good. Quitting smoking may be the most important step you can take to protect your heart. · Limit alcohol to 2 drinks a day for men and 1 drink a day for women. Too much alcohol can cause health problems. · Avoid getting sick from colds and the flu. Get a pneumococcal vaccine shot. If you have had one before, ask your doctor whether you need another dose. Get a flu shot each year. If you must be around people with colds or the flu, wash your hands often. When should you call for help? Call 911 if you have symptoms of sudden heart failure such as:  · You have severe trouble breathing. · You cough up pink, foamy mucus. · You have a new irregular or rapid heartbeat. Call your doctor now or seek immediate medical care if:  · You have new or increased shortness of breath. · You are dizzy or lightheaded, or you feel like you may faint. · You have sudden weight gain, such as more than 2 to 3 pounds in a day or 5 pounds in a week. (Your doctor may suggest a different range of weight gain.)  · You have increased swelling in your legs, ankles, or feet. · You are suddenly so tired or weak that you cannot do your usual activities. Watch closely for changes in your health, and be sure to contact your doctor if you develop new symptoms. Where can you learn more? Go to https://[employer] Month.CareXtend. org and sign in to your [employer] account. Enter S571 in the [employer]ek box to learn more about \"Heart Failure: Care Instructions. \"     If you do not have an account, please click on the \"Sign Up Now\" link. Current as of: December 16, 2019               Content Version: 12.5  © 4019-9796 Healthwise, Incorporated. Care instructions adapted under license by [facility] [place] ([facility]).  If you have questions about a medical condition or this instruction, always ask your healthcare professional. Gregg Rangel any warranty or liability for your use of this information. Patient Education        Low Sodium Diet (2,000 Milligram): Care Instructions  Your Care Instructions     Too much sodium causes your body to hold on to extra water. This can raise your blood pressure and force your heart and kidneys to work harder. In very serious cases, this could cause you to be put in the hospital. It might even be life-threatening. By limiting sodium, you will feel better and lower your risk of serious problems. The most common source of sodium is salt. People get most of the salt in their diet from canned, prepared, and packaged foods. Fast food and restaurant meals also are very high in sodium. Your doctor will probably limit your sodium to less than 2,000 milligrams (mg) a day. This limit counts all the sodium in prepared and packaged foods and any salt you add to your food. Follow-up care is a key part of your treatment and safety. Be sure to make and go to all appointments, and call your doctor if you are having problems. It's also a good idea to know your test results and keep a list of the medicines you take. How can you care for yourself at home? Read food labels  · Read labels on cans and food packages. The labels tell you how much sodium is in each serving. Make sure that you look at the serving size. If you eat more than the serving size, you have eaten more sodium. · Food labels also tell you the Percent Daily Value for sodium. Choose products with low Percent Daily Values for sodium. · Be aware that sodium can come in forms other than salt, including monosodium glutamate (MSG), sodium citrate, and sodium bicarbonate (baking soda). MSG is often added to Asian food. When you eat out, you can sometimes ask for food without MSG or added salt.   Buy low-sodium foods  · Buy foods that are labeled \"unsalted\" (no salt added), \"sodium-free\" (less than 5 mg of sodium per serving), or \"low-sodium\" (less than 140 mg of sodium sodium-free or low-sodium. ? [employer] fries, pizza, tacos, and other fast foods. ? Pickles, olives, ketchup, and other condiments, especially soy sauce, unless labeled sodium-free or low-sodium. Where can you learn more? Go to https://chpepiceweb.[employer]. org and sign in to your [employer] account. Enter A862 in the [employer]ame box to learn more about \"Low Sodium Diet (2,000 Milligram): Care Instructions. \"     If you do not have an account, please click on the \"Sign Up Now\" link. Current as of: August 22, 2019               Content Version: 12.5  © 6234-9980 Healthwise, Incorporated. Care instructions adapted under license by [facility] ([facility]). If you have questions about a medical condition or this instruction, always ask your healthcare professional. [address] any warranty or liability for your use of this information.

## 2020-09-16 NOTE — PROGRESS NOTES
checking. Cardiac symptoms dyspnea, fatigue and lower extremity edema. Patient admits leg swelling is worsening. Patient denies chest pain, chest pressure/discomfort, claudication, exertional chest pressure/discomfort, irregular heart beat, near-syncope, palpitations and syncope. Cardiovascular risk factors: advanced age (older than 54 for men, 72 for women), diabetes mellitus, dyslipidemia, hypertension, male gender, microalbuminuria, obesity (BMI >= 30 kg/m2), sedentary lifestyle and smoking/ tobacco exposure. Use of agents associated with hypertension: none. History of target organ damage: angina/ prior myocardial infarction, chronic kidney disease, heart failure and prior coronary revascularization. Patient has 1 stent. Patient used to see CHF clinic. I verified all of his medications, the only ones he is not compliant with his Bumex and metolazone due to frequent urination he self adjusted. Patient says he is taking Bumex 3 mg once a day, instead of BID  Metolazone 2.5 mg twice a week, instead of 3 times a week  He says he uses a pillbox    hasn't been using the BP cuff lately, but his daughter just found it and she did take the blood pressure during the office visit. blood pressure is Normal.    Patient says he did check his weight yesterday and apparently he has lost some weight  His weight  Is 400 pounds as reported by patient, praise given  Wt Readings from Last 3 Encounters:   02/20/20 (!) 406 lb (184.2 kg)   02/13/20 (!) 414 lb 11.2 oz (188.1 kg)   01/09/20 (!) 411 lb (186.4 kg)         Lab Results   Component Value Date    LVEF 53 03/29/2018    LVEFMODE Echo 08/29/2017           COPD and chronic respiratory failure:  Current treatment includes short-acting beta agonist inhaler, nebulized beta agonist, combined beta agonist/steroid inhaler, anticholinergic inhaler, home 02-at 3 L/min at nighttime, which has been somewhat effective.     Residual symptoms: chronic dyspnea and cough productive of clear sputum in moderate amounts. He denies purulent sputum, wheezing. Using nebulizer 2 times a day every day  Has restarted to smoke \" a little\"  He does not have a pulse oximeter    Nicotine dependence. Smoker, counseling given to quit smoking. Restarted smoking, using chantix now. Has been trying to quit smoking again       Patient has chronic low back pain midline and radiating to the left side. Patient reports intensity of pain 5 out of 10 today with pain medication, but goes up to 7 out of 10 with activities and at nighttime. Patient denies side effects from medications. He denies constipation or sedation. Patient says the pain medication has been staying more active and makes the pain more tolerable. Patient says he also does stretching, repositioning, heating pad which help very little. Hyperlipidemia:  No new myalgias or GI upset on pravastatin (Pravachol). Medication compliance: compliant all of the time. Patient is  following a low fat, low cholesterol diet. LDL is almost at goal, with high triglycerides  Lab Results   Component Value Date    LDLCHOLESTEROL 75 03/03/2020    LDLDIRECT 72 03/13/2013     Lab Results   Component Value Date    TRIG 247 (H) 09/12/2019    TRIG 312 (H) 09/20/2018    TRIG 278 (H) 08/16/2017         Review of Systems   Constitutional: Positive for fatigue. Negative for activity change, appetite change, chills, diaphoresis, fever and unexpected weight change. HENT: Positive for ear discharge (b/l), ear pain (b/l), hearing loss (b/l) and tinnitus (b/l). Respiratory: Positive for apnea (on CPAP and oxygen at nighttime at 3 L/min), cough and shortness of breath. Negative for chest tightness and wheezing. Cardiovascular: Positive for leg swelling. Negative for chest pain and palpitations. Gastrointestinal: Negative for abdominal distention, abdominal pain, constipation, diarrhea, nausea and vomiting.    Endocrine: Negative for cold intolerance, machine. Yes Zara Zacarias MD   Lancets 30G MISC Testing 3 times a day. Please dispense according to patients insurance. Yes Zara Zacarias MD   pravastatin (PRAVACHOL) 40 MG tablet Take 1 tablet by mouth nightly Yes Zara Zacarias MD   albuterol (PROVENTIL) (2.5 MG/3ML) 0.083% nebulizer solution Take 3 mLs by nebulization every 6 hours as needed for Wheezing or Shortness of Breath Yes Zara Zacarias MD   Oxygen Tubing MISC by Does not apply route DX COPD. chronic respiratory failure Yes Zara Zacarias MD   spironolactone (ALDACTONE) 50 MG tablet Take 1 tablet by mouth daily Yes Zara Zacarias MD   chlorhexidine (HIBICLENS) 4 % external liquid Mix 4 oz solution in a bathtub full of water and have baths daily for 1 week, then weekly, for decontamination Yes Zara Zacarias MD   Respiratory Therapy Supplies (NEBULIZER/TUBING/MOUTHPIECE) KIT Dx COPD needs nebulizer supplies Yes Zara Zacarias MD   mupirocin (BACTROBAN) 2 % ointment Apply topically 1-2 times daily on the affected area .  OK to substitute to cream Yes Zara Zacarias MD   nitroGLYCERIN (NITROSTAT) 0.4 MG SL tablet Place 1 tablet under the tongue every 5 minutes as needed for Chest pain (and call 911) Yes Zara Zacarias MD   ADVAIR -21 MCG/ACT inhaler INHALE Stoney Isidroanns Yes Zara Zacarias MD   pantoprazole (PROTONIX) 40 MG tablet Take 1 tablet by mouth nightly Yes Zara Zacarias MD   docusate sodium (STOOL SOFTENER) 100 MG capsule Take 1 capsule by mouth 2 times daily Yes Zara Zacarias MD   diclofenac sodium (VOLTAREN) 1 % GEL Apply 2 g topically 2 times daily Yes Esperanza Lucia, APRN - CNP   Ferrous Sulfate (IRON) 325 (65 Fe) MG TABS Take 1 tablet by mouth daily Yes Zara Zacarias MD   naloxone 4 MG/0.1ML LIQD nasal spray 1 spray by Nasal route as needed for Opioid Reversal Yes Zara Zacarias MD   [address] [address] Patient to test blood sugar up to 4 times daily. Yes Marce Goodell, MD   lisinopril (PRINIVIL;ZESTRIL) 5 MG tablet Take 1 tablet by mouth daily . Discontinued Lisinopril  10 mg Yes Kamilah Valle MD   COMFORT ASSIST INSULIN SYRINGE 31G X 5/16\" 1 ML MISC Use to subcutaneously inject insulin three times daily Yes Kamilah Valle MD   tiotropium (SPIRIVA RESPIMAT) 2.5 MCG/ACT AERS inhaler Inhale 2 puffs into the lungs daily Yes Kamilah Valle MD   bumetanide (BUMEX) 1 MG tablet Take 3 tablets by mouth 2 times daily  Patient taking differently: Take 3 mg by mouth daily  Yes Kamilah Valle MD   metoprolol tartrate (LOPRESSOR) 50 MG tablet Take 1 tablet by mouth 2 times daily Yes Kamilah Valle MD   nystatin (MYCOSTATIN) 628054 UNIT/GM powder Apply 2-3 times daily in skin folds. Yes Kamilah Valle MD   Lancet Devices (LANCING DEVICE) MISC Provide patient with lancing device appropriate for his machine/lancing needles. Yes Kamilah Valle MD   Lidocaine 4 % LOTN Apply topically Yes Historical Provider, MD   metolazone (ZAROXOLYN) 2.5 MG tablet Take 2.5 mg by mouth three times a week MWF Yes Historical Provider, MD   PROAIR  (90 Base) MCG/ACT inhaler INHALE TWO PUFFS BY MOUTH EVERY 6 HOURS AS NEEDED FOR WHEEZING OR SHORTNESS OF BREATH Yes Chato Savage MD   Handicap Placard MISC by Does not apply route Can't walk greater than 200 feet. Expires in 5 years. Yes Kamilah Valle MD   miconazole (MICOTIN) 2 % powder Apply topically 2 times daily. Yes Nithin Eason MD   fluticasone (CUTIVATE) 0.05 % cream Apply topically 2 times daily  Yes Historical Provider, MD   fluticasone (FLONASE) 50 MCG/ACT nasal spray 2 sprays by Nasal route daily  Patient taking differently: 2 sprays by Nasal route daily as needed (sinus symptoms)  Yes Paige Bryant MD   Melatonin 10 MG TABS Take 10 mg by mouth nightly as needed (insomnia) Yes Kamilah Valle MD   aspirin 81 MG EC tablet Take 81 mg by mouth daily.  Yes Historical Provider, MD   Spacer/Aero Chamber Mouthpiece [address] [facility] 1 each by Does not apply route once as needed (to be used with his inhalers)  Ann Marie Hale MD       Social History     Tobacco Use    Smoking status: Former Smoker     Packs/day: 0.25     Years: 33.00     Pack years: 8.25     Types: Cigarettes     Start date: 1985     Last attempt to quit: 2020     Years since quittin.6    Smokeless tobacco: Former User     Types: Snuff     Quit date: 1995   Substance Use Topics    Alcohol use: No     Alcohol/week: 0.0 standard drinks    Drug use: Yes     Types: Marijuana          PHYSICAL EXAMINATION:    Vital Signs: (As obtained by patient/caregiver or practitioner observation)  -vital signs stable and within normal limits except morbid obesity per BMI, last BMI calculated 55.06 kg/M2  Patient-Reported Vitals 2020   Patient-Reported Weight 400 lbs   Patient-Reported Height 6 ft   Patient-Reported Systolic 173   Patient-Reported Diastolic 77           Intensity of pain is 5 out of 10       Constitutional: [x] Appears well-developed and well-nourished [x] No apparent distress      [x] Abnormal-obese      Mental status  [x] Alert and awake  [x] Oriented to person/place/time [x]Able to follow commands      Eyes:  EOM    [x]  Normal  [] Abnormal-  Sclera  [x]  Normal  [] Abnormal -         Discharge [x]  None visible  [] Abnormal -    HENT:   [x] Normocephalic, atraumatic.   [] Abnormal   [x] Mouth/Throat: Mucous membranes are moist.     External Ears [x] Normal  [] Abnormal-     Neck: [x] No visualized mass     Pulmonary/Chest: [x] Respiratory effort normal.  [x] No visualized signs of difficulty breathing or respiratory distress        [x] Abnormal pursed lip breathing noticed       Musculoskeletal:   [] Normal gait with no signs of ataxia         [x] Normal range of motion of neck        [x] Abnormal-  did not ambulate during the encounter      Neurological:        [x] No Facial Asymmetry (Cranial nerve 7 motor function) (limited exam to video visit)          [x] No gaze palsy        [] Abnormal-            Skin:        [x] No significant exanthematous lesions or discoloration noted on facial skin         [] Abnormal-            Psychiatric:      [x] No Hallucinations       []Mood is normal      [x]Behavior is normal      [x]Judgment is normal      [x]Thought content is normal       [x] Abnormal-anxious and depressed. He says he has been self isolating, family has been helping him a little  Other pertinent observable physical exam findings- NONE    Due to this being a TeleHealth encounter, evaluation of the following organ systems is limited: Vitals/Constitutional/EENT/Resp/CV/GI//MS/Neuro/Skin/Heme-Lymph-Imm. Most recent labs reviewed with the patient and all questions fully answered. Chronic anemia slightly worsening, mild iron deficiency, he was supposed to get colonoscopy. continue iron oral for the moment and recheck labs     hyperglycemia  Chronic kidney disease stage III improved  High triglycerides  Hyperlipidemia  Otherwise labs within normal limits        Lab Results   Component Value Date    WBC 9.0 07/13/2020    HGB 12.8 (L) 07/13/2020    HCT 39.2 (L) 07/13/2020    MCV 85.3 07/13/2020     07/13/2020       Lab Results   Component Value Date     07/13/2020    K 4.2 07/13/2020     07/13/2020    CO2 24 07/13/2020    BUN 25 07/13/2020    CREATININE 1.40 07/13/2020    GLUCOSE 193 07/13/2020    CALCIUM 9.9 07/13/2020        Lab Results   Component Value Date    ALT 13 07/13/2020    AST 13 07/13/2020    ALKPHOS 76 07/13/2020    BILITOT 0.43 07/13/2020       Lab Results   Component Value Date    TSH 2.04 07/13/2020       Lab Results   Component Value Date    CHOL 139 09/12/2019    CHOL 161 09/20/2018    CHOL 172 08/16/2017     Lab Results   Component Value Date    TRIG 247 (H) 09/12/2019    TRIG 312 (H) 09/20/2018    TRIG 278 (H) 08/16/2017     Lab Results   Component Value Date    HDL 35 (L) 03/03/2020    HDL 29 (L) 09/12/2019    HDL 28 (L) 09/20/2018     Lab Results   Component Value Date    LDLCHOLESTEROL 75 03/03/2020    LDLCHOLESTEROL 61 09/12/2019    LDLCHOLESTEROL 71 09/20/2018       Lab Results   Component Value Date    CHOLHDLRATIO 4.4 03/03/2020    CHOLHDLRATIO 4.8 09/12/2019    CHOLHDLRATIO 5.8 (H) 09/20/2018       Lab Results   Component Value Date    LABA1C 9.5 (H) 07/13/2020    LABA1C 7.3 02/20/2020    LABA1C 6.2 11/19/2019         Lab Results   Component Value Date    YLIGMVRO53 360 07/13/2020       Lab Results   Component Value Date    FOLATE 14.5 07/13/2020       Lab Results   Component Value Date    IRON 50 (L) 11/26/2019    TIBC 352 11/26/2019    FERRITIN 100 12/26/2016       Lab Results   Component Value Date    VITD25 42.5 07/13/2020       ASSESSMENT/PLAN:    1. Type 2 diabetes mellitus with diabetic polyneuropathy, with long-term current use of insulin (HCC)  Worsening  he is not yet due for A1c  Lab Results   Component Value Date    LABA1C 9.5 (H) 07/13/2020    LABA1C 7.3 02/20/2020    LABA1C 6.2 11/19/2019   Patient was strongly advised to see endocrinologist    - CBC Auto Differential; Future  - Comprehensive Metabolic Panel; Future  - Vitamin B12 & Folate; Future  -advised home blood glucose testing 2-3 times daily  -daily feet exam, Foot care: advised to wash feet daily, pat dry and apply lotion at night, avoiding between toes. Need to look at feet daily and report to a physician any signs of inflammation or skin damage  -annual dilated eye exam  -Low carb, low fat diet, increase fruits and vegetables, and exercise 4-5 times a day 30-40 minutes a day discussed  -continue current treatment  -continue Aspirin  -continue ACEI and statin    Patient to make appointment with Dr. Diana Simmons and to improve his eating habits    2. Other infective acute otitis externa of both ears  Worsening  - cephALEXin (KEFLEX) 500 MG capsule;  Take 1 capsule by mouth 4 times daily Dispense: 40 capsule; Refill: 0  - neomycin-polymyxin-hydrocortisone (CORTISPORIN) 3.5-67660-2 otic suspension; Place 3 drops into both ears 4 times daily OK to substitute. For 10 days  Dispense: 1 Bottle; Refill: 0    Patient will call ENT, phone number given to his daughter who wrote down the instructions, they will let me know if unable to get in.    3. Benign hypertensive heart and CKD, stage 3 (GFR 30-59), w CHF ([facility] [address]  Improving chronic kidney disease stage III  Well-controlled hypertension  Stable diastolic congestive heart failure  Patient's compliance with all medications discussed  - CBC Auto Differential; Future  - Brain Natriuretic Peptide; Future  - Magnesium; Future  - Comprehensive Metabolic Panel; Future  - Phosphorus; Future  Lab Results   Component Value Date    LVEF 53 03/29/2018    LVEFMODE Echo 08/29/2017     Patient was advised to increase Bumex from 3 mg daily ,to 3 mg in the morning and 1 mg at lunchtime. He does not want to take 3 mg twice a day due to increased urination. Patient advised compliance with metolazone, 3 times a week instead of only twice a week       4. Mixed type COPD (chronic obstructive pulmonary disease) (HCC)  Likely worsening as he restarted to smoke  I admonished him to stop smoking again  Increase nebulizer treatments every day to 3 times a day  Continue current inhalers and oxygen    5. Chronic respiratory failure with hypoxia (HCC)  Worsening  Patient to increase nebulizer treatments from twice a day to 3 times a day  Patient to start using oxygen throughout the day at 2 to 3 L/min and continue the use of oxygen at 3 L/min  at nighttime. 6. Chronic midline low back pain with left-sided sciatica  Improved with medications  Continue repositioning and stretching, heating pad, Voltaren gel as needed, oxycodone 10 mg 3 times a day as needed, tizanidine as needed,    7.  Hyperlipidemia with target LDL less than 70  Stable  Continue pravastatin 40 mg daily  - Lipid Panel; Future    Patient was advised to have the labs done within a week    I discussed with the daughter and the patient importance of checking the blood pressure every day, and to buy a pulse oximeter to check the oxygen. Controlled Substance Monitoring:    Acute and Chronic Pain Monitoring:   RX Monitoring 9/16/2020   Attestation -   Periodic Controlled Substance Monitoring Possible medication side effects, risk of tolerance/dependence & alternative treatments discussed. ;No signs of potential drug abuse or diversion identified. ;Assessed functional status. Chronic Pain > 50 MEDD -   Chronic Pain > 80 MEDD -         Miguel received counseling on the following healthy behaviors: nutrition, exercise, medication adherence, tobacco cessation and weight loss  Reviewed prior labs and health maintenance. Continue current medications, diet and exercise. Discussed use, benefit, and side effects of prescribed medications. Barriers to medication compliance addressed. Patient given educational materials - see patient instructions. All patient questions answered. Patient voiced understanding. Return in about 3 months (around 12/16/2020) for ALWAYS NEEDS 30 MIN. APPT, **Please obtain A1c from DR. MARTINEZ., CHF, COPD, DM2, HTN, HLP, LABS F/U. Data Unavailable      Future Appointments   Date Time Provider William Hernandez   9/30/2020  8:10 AM [facility] MEDICATION MGMT [facility] MED [facility]   12/23/2020  8:00 AM [facility]   [facility]2021  8[facility]00 [facility]        Total time spent during this visit 25 minutes including face-to-face, counseling, charting review, and non-face-to-face time. Kong Vera. is a 53 y.o. male being evaluated by a Virtual Visit (video visit) encounter to address concerns as mentioned above.     Due to this being a TeleHealth encounter ([place] [facility] [place] evaluation of the following organ systems was limited: Vitals/Constitutional/EENT/Resp/CV/GI//MS/Neuro/Skin/Heme-Lymph-Imm. Pursuant to the emergency declaration under the [address] [place] [address], [address] [place] [address] and the [employer] and [employer] Act, this Virtual Visit was conducted with patient's (and/or legal guardian's) consent, to reduce the patient's risk of exposure to COVID-19 and provide necessary medical care. The patient (and/or legal guardian) has also been advised to contact this office for worsening conditions or problems, and seek emergency medical treatment and/or call 911 if deemed necessary. Services were provided through a video synchronous discussion virtually to substitute for in-person clinic visit. Patient was located at his home, provider was located in the office, at the primary practice location. Patient's daughter, Harshil Poon  was present     Patient identification was verified at the start of the visit: Yes    Total time spent for this encounter: 25 minutes    --Anjel Alonso MD on 9/16/2020 at 7:05 PM    An electronic signature was used to authenticate this note.

## 2020-09-22 ENCOUNTER — HOSPITAL ENCOUNTER (OUTPATIENT)
Dept: PHARMACY | Age: 55
Setting detail: THERAPIES SERIES
Discharge: HOME | End: 2020-09-22
Payer: COMMERCIAL

## 2020-09-22 PROCEDURE — 99212 OFFICE O/P EST SF 10 MIN: CPT

## 2020-09-22 NOTE — PROGRESS NOTES
Diabetic Medication Management   [facility]: TYESHA ADRIAN    [address]. [place], [address] [facility]  Phone: [phone]  Fax: [phone]    NAME: Robert Rocha. MEDICAL RECORD NUMBER:  [id number]  AGE: 53 y.o. GENDER: male  : 1965  EPISODE DATE:  2020       Mr. Remington Porter was referred to [facility] Medication Management Services by Dr. Liseth Winter, Special instructions include: titrate all medications (as defined in clinic's policy and procedure)    Visit completed over phone to reduce patient exposure due to COVID-19    Goals per referral:   Fasting blood glucose: < 130  Peak postprandial glucose: < 180  A1C: < 7    Other goals:  Blood pressure goal: 130/80  Weight loss goal (~10%): Target weight  380 to be reached by date: TBD (3-6 months)  Physical Activity goal:    10 minutes three times per week to be reached by date: TBD (3-6 months)  Smoking Cessation   Quit Date: Currently using Chantix  Cholesterol at target:   Date: Yes LDL 75  No HDL 35 Trig 218 (3/3/20)  Annual eye exam:    Date: No due to covid  Comprehensive annual foot exam:   Date: No due to covid  Annual urine Albumin and serum creatinine:   Date: Yes 15 mg/L (2018), SrCr 1.40 mg/dL (2020)        Subjective   Mr. Remington Porter is a 53 y.o. male here for the Diabetes Service for self-management education, medication review including over the counter medications and herbal products, overall well-being assessment, transition of care and any needed adjustments with updates and recommendations communicated to the referring physician. Patient's name and  verified. Patient Findings:   Patient has resumed checking blood sugar three times a day along with three times a day insulin as instructed. Patient reports he is still not eating as he should. Reports often eating a small snack rather than a good well balanced meal.  Not skipping meals completely as much but often not much.   Also, admits to eating quicker foods that are higher in sugar/carbs such as sugary cereal for breakfast.  Patient understands what he should be eating and plans to try to gradually improve but is honest in that it will not likely soto much any time margie. Patient still sounds very \"down\" but overall states he is doing better. Still feels like just isolating in his room much of the time. Patient continues Chantix at 1mg twice a day and has cut his smoking in half down to 10 cigs/1/2 pack per day. Plans to stop completely in a couple days. Congratulated patient on his cutting down and offered options to cut down to  5 per day if completely quitting is too ambitious. Explained that the goal is to quit and stay quit and if it takes him a couple weeks to get to no cigs/day that is OK. Patient acknowledges but still plans to completely stop in a couple days. Patient states wound continues to heal well. Patient reports SOB is still an issue. Reports using O2 more than he did before. Encouraged him to continue with smoking cessation as this will help. Patient still using nebulizer and inhalers as directed and states they do provide relief. Has had a few episodes of hypoglycemia in last week or two. Mostly when patient skips or eats very little for a meal.  Patient acknowledges his blood sugar was low at 63 yesterday am (fasting). He ate a piece of candy while he was making breakfast and then took blood sugar after breakfast with increase to 113. Encouraged patient to check blood sugar about 20 min after eating candy when hypoglycemia occurs in the furture.        []  Missed doses   []  Emergency Room Visit    []  Medication changes  []  Hospitalization   [x]  Diet changes   []  Acute illness   []  Activity changes      Symptoms of hypoglycemia    []  None    []  Shakiness    []  Lightheadedness or dizziness   [x]  Confusion      []  Sweating   [x]  Other -sluggish       Symptoms of hyperglycemia -.    [x]  None   []  Frequent urination    []  Increased thirst   []  Other    Medication adverse reactions (none due to diabetic medicaitons)   [x]  None   []  Diarrhea / Nausea / Vomiting / Constipation / flatulence   []  Hypertension   []  Peripheral edema     []  Signs of infection   []  Headache   []  Vision changes   []  Increased cholesterol    []  Weight gain   []  Change in renal function   []  Increased potassium  []  Other      Comments:  Patient takes Humulin R U500. Patient has been taking Humulin R U500 0.5ml with breakfast, 0.55ml with lunch and 0.4 with dinner. If recent hospital admission / ED visit, was this related to Diabetes No:Cellulitis.  Discharge date 6/12/19    Objective     PMHx:    Past Medical History:   Diagnosis Date    Acute bronchitis with chronic obstructive pulmonary disease (COPD) ([address]     Acute on chronic kidney failure ([address] 7/20/2017    Acute on chronic respiratory failure (HCC) 10/2/2018    Adhesive capsulitis of left shoulder 3/25/2017    Anxiety 10/2/2016    Arthropathy, unspecified, other specified sites 6/13/2013    Asthma     Bilateral lower leg cellulitis 2/17/2016    Blood in stool     CAD (coronary artery disease)     Cellulitis of both lower extremities 5/25/2017    Cellulitis of leg, left 07/20/2017    CHF (congestive heart failure), NYHA class III ([facility]) 8/14/2013    Chronic back pain     Chronic bronchitis ([facility])     Chronic headaches     was referred to neuro, testing scheduled    Chronic kidney disease     Chronic respiratory failure ([facility])     Chronic ulcer of left leg, with fat layer exposed ([address] 2/22/2019    Class 2 severe obesity due to excess calories with serious comorbidity and body mass index (BMI) of 35.0 to 35.9 in adult ([facility])     (BMI 35.0-39.9 without comorbidity)    COPD (chronic obstructive pulmonary disease) ([address]     COPD exacerbation ([address] 11/2/2016    Diabetic neuropathy ([address] 8/14/2013    Displacement of lumbar intervertebral disc without hyperosmolarity without coma    Type II or unspecified type diabetes mellitus without mention of complication, not stated as uncontrolled     uncontrolled    Wears glasses     for reading    Wound of left leg 2019       Social History:    Social History     Tobacco Use    Smoking status: Former Smoker     Packs/day: 0.25     Years: 33.00     Pack years: 8.25     Types: Cigarettes     Start date: 1985     Last attempt to quit: 2020     Years since quittin.7    Smokeless tobacco: Former User     Types: Snuff     Quit date: 1995   Substance Use Topics    Alcohol use: No     Alcohol/week: 0.0 standard drinks       Pertinent Labs:    Lab Results   Component Value Date    LABA1C 9.5 (H) 2020    LABA1C 7.3 2020    LABA1C 6.2 2019     Lab Results   Component Value Date    CHOL 139 2019    TRIG 247 (H) 2019    HDL 35 (L) 2020     Lab Results   Component Value Date    CREATININE 1.40 (H) 2020    BUN 25 (H) 2020     2020    K 4.2 2020     2020    CO2 24 2020     Lab Results   Component Value Date    ALT 13 2020       Weight: Patient states weight from home scale is 401lbs. Wt Readings from Last 3 Encounters:   20 (!) 406 lb (184.2 kg)   20 (!) 414 lb 11.2 oz (188.1 kg)   20 (!) 411 lb (186.4 kg)       Current medications:  Prior to Admission medications    Medication Sig Start Date End Date Taking? Authorizing Provider   cephALEXin (KEFLEX) 500 MG capsule Take 1 capsule by mouth 4 times daily 20   Michelle Michelle MD   neomycin-polymyxin-hydrocortisone (CORTISPORIN) 3.5-07888-0 otic suspension Place 3 drops into both ears 4 times daily OK to substitute. For 10 days 20   Michelle Michelle MD   varenicline (CHANTIX STARTING MONTH ) 0.5 MG X 11 & 1 MG X 42 tablet Take by mouth.  20   Michelle Michelle MD   oxyCODONE HCl (OXY-IR) 10 MG immediate release tablet Take 1 tablet by mouth every 8 hours as needed for Pain for up to 30 days. 9/1/20 10/1/20  Grant Davalos MD   ketoconazole (NIZORAL) 2 % cream Apply topically  2 times a day x 4-6 weeks, right palm 8/31/20   Grant Davalos MD   vitamin D3 (CHOLECALCIFEROL) 25 MCG (1000 UT) TABS tablet Take 1 tablet by mouth daily 8/26/20   Grant Davalos MD   clopidogrel (PLAVIX) 75 MG tablet TAKE ONE TABLET BY MOUTH DAILY 7/29/20   Grant Davalos MD   vitamin B-12 (CYANOCOBALAMIN) 500 MCG tablet Take 1 tablet by mouth daily 7/13/20   Grant Davalos MD   magnesium oxide (MAG-OX) 400 (240 Mg) MG tablet Take 1 tablet by mouth daily 7/13/20   Grant Davalos MD   tiZANidine (ZANAFLEX) 4 MG tablet TAKE ONE TABLET BY MOUTH EVERY 8 HOURS AS NEEDED FOR BACK PAIN 7/8/20   Grant Davalos MD   insulin regular human (HUMULIN R U-500) 500 UNIT/ML concentrated injection vial Inject 0.5 ml with breakfast, 0.55 ml with lunch and 0.45 ml with dinner. 6/25/20   Grant Davalos MD   escitalopram (LEXAPRO) 10 MG tablet Take 1 tablet by mouth daily 6/17/20   Grant Davalos MD   blood glucose monitor strips Testing 3 times a day. Please dispense according to patients insurance and machine. 6/11/20   Grant Davalos MD   Lancets 30G MISC Testing 3 times a day. Please dispense according to patients insurance. 6/11/20   Grant Davalos MD   pravastatin (PRAVACHOL) 40 MG tablet Take 1 tablet by mouth nightly 5/20/20   Grant Davalos MD   albuterol (PROVENTIL) (2.5 MG/3ML) 0.083% nebulizer solution Take 3 mLs by nebulization every 6 hours as needed for Wheezing or Shortness of Breath 4/15/20   Grant Davalos MD   Oxygen Tubing MISC by Does not apply route DX COPD.  chronic respiratory failure 3/26/20   Grant Davalos MD   spironolactone (ALDACTONE) 50 MG tablet Take 1 tablet by mouth daily 3/11/20   Grant Davalos MD   chlorhexidine (HIBICLENS) 4 % external liquid Mix 4 oz solution in a bathtub full of water and have baths daily for 1 week, then weekly, for decontamination 2/20/20   Dominic Sierra MD   Respiratory Therapy Supplies (NEBULIZER/TUBING/MOUTHPIECE) KIT Dx COPD needs nebulizer supplies 2/20/20   Dominic Sierra MD   mupirocin (BACTROBAN) 2 % ointment Apply topically 1-2 times daily on the affected area . OK to substitute to cream 2/13/20   Dominic Sierra MD   nitroGLYCERIN (NITROSTAT) 0.4 MG SL tablet Place 1 tablet under the tongue every 5 minutes as needed for Chest pain (and call 911) 2/13/20   Dominic Sierra MD   [facility] 230-21 MCG/ACT inhaler INHALE TWO PUFFS BY MOUTH TWICE A DAY 2/3/20   Dominic Sierra MD   pantoprazole (PROTONIX) 40 MG tablet Take 1 tablet by mouth nightly 1/15/20   Dominic Sierra MD   docusate sodium (STOOL SOFTENER) 100 MG capsule Take 1 capsule by mouth 2 times daily 1/15/20   Dominic Sierra MD   diclofenac sodium (VOLTAREN) 1 % GEL Apply 2 g topically 2 times daily 12/5/19   Esperanza Lucia, APRN - CNP   Ferrous Sulfate (IRON) 325 (65 Fe) MG TABS Take 1 tablet by mouth daily 12/2/19   Dominic Sierra MD   naloxone 4 MG/0.1ML LIQD nasal spray 1 spray by Nasal route as needed for Opioid Reversal 11/19/19   Dominic Sierra MD   ONE TOUCH ULTRASOFT LANCETS [id number] [facility] Patient to test blood sugar up to 4 times daily. 11/7/19   Aditi Kwong MD   lisinopril (PRINIVIL;ZESTRIL) 5 MG tablet Take 1 tablet by mouth daily . Discontinued Lisinopril  10 mg 10/10/19   Dominic Sierra MD   COMFORT ASSIST INSULIN SYRINGE 31G X 5/16\" 1 ML MISC Use to subcutaneously inject insulin three times daily 10/10/19   Dominic Sierra MD   tiotropium (SPIRIVA RESPIMAT) 2.5 MCG/ACT AERS inhaler Inhale 2 puffs into the lungs daily 10/10/19   Dominic Sierra MD   bumetanide (BUMEX) 1 MG tablet Take 3 tablets by mouth 2 times daily  Patient taking differently: Take 3 mg by mouth daily  10/10/19   Dominic Sierra MD   metoprolol Infection / fall - ankle pain    Last office dictation reviewed: yes        type 2 DM under worsening control as evidenced by A1C 9.5 on 7/14/20    Plan       Counseling at today's visit:   -Continued monitoring of blood glucose with documentation on log three times a day. --Resume exercise when able   -Continued dose of insulin:0.5ml with breakfast, 0.55ml with lunch  and 0.4mg with dinner. (will not increase at this time due to instances of hypoglycemia and significant improvement in past couple weeks. If remains elevated prior to lunch / dinner may adjust dosing.  -Call podiatrist for appt (Dr. Carmita Damon)    -Call pulmonologist for appt (Dr. Brianne Swan)   -Call for eye appt.       Physician Follow-up:  Every 6 months  Dr. Nandini Fonseca     Medication Management Follow-up:   Diabetes Service   1 week in person     Electronically signed by Yara Montero RPH on 9/22/2020 at 9:56 AM     CLINICAL PHARMACY CONSULT: MED RECONCILIATION/REVIEW ADDENDUM    For Pharmacy Admin Tracking Only    PHSO: No  Total # of Interventions Recommended: 0  Total Interventions Accepted: 0  Time Spent (min): 40    Yesenia Rizvi, PharmD  [address]

## 2020-09-25 ENCOUNTER — HOSPITAL ENCOUNTER (OUTPATIENT)
Age: 55
Setting detail: SPECIMEN
Discharge: HOME | End: 2020-09-25
Payer: COMMERCIAL

## 2020-09-25 LAB
ABSOLUTE EOS #: 0.2 K/UL (ref 0–0.4)
ABSOLUTE IMMATURE GRANULOCYTE: ABNORMAL K/UL (ref 0–0.3)
ABSOLUTE LYMPH #: 1.4 K/UL (ref 1–4.8)
ABSOLUTE MONO #: 0.7 K/UL (ref 0.1–1.3)
ALBUMIN SERPL-MCNC: 4 G/DL (ref 3.5–5.2)
ALBUMIN/GLOBULIN RATIO: ABNORMAL (ref 1–2.5)
ALP BLD-CCNC: 64 U/L (ref 40–129)
ALT SERPL-CCNC: 16 U/L (ref 5–41)
ANION GAP SERPL CALCULATED.3IONS-SCNC: 12 MMOL/L (ref 9–17)
AST SERPL-CCNC: 15 U/L
BASOPHILS # BLD: 1 % (ref 0–2)
BASOPHILS ABSOLUTE: 0 K/UL (ref 0–0.2)
BILIRUB SERPL-MCNC: 0.42 MG/DL (ref 0.3–1.2)
BNP INTERPRETATION: NORMAL
BUN BLDV-MCNC: 27 MG/DL (ref 6–20)
BUN/CREAT BLD: ABNORMAL (ref 9–20)
CALCIUM SERPL-MCNC: 9.3 MG/DL (ref 8.6–10.4)
CHLORIDE BLD-SCNC: 102 MMOL/L (ref 98–107)
CHOLESTEROL/HDL RATIO: 4.6
CHOLESTEROL: 151 MG/DL
CO2: 26 MMOL/L (ref 20–31)
CREAT SERPL-MCNC: 1.38 MG/DL (ref 0.7–1.2)
DIFFERENTIAL TYPE: ABNORMAL
EOSINOPHILS RELATIVE PERCENT: 2 % (ref 0–4)
FOLATE: 13.3 NG/ML
GFR AFRICAN AMERICAN: >60 ML/MIN
GFR NON-AFRICAN AMERICAN: 53 ML/MIN
GFR SERPL CREATININE-BSD FRML MDRD: ABNORMAL ML/MIN/{1.73_M2}
GFR SERPL CREATININE-BSD FRML MDRD: ABNORMAL ML/MIN/{1.73_M2}
GLUCOSE BLD-MCNC: 54 MG/DL (ref 70–99)
HCT VFR BLD CALC: 38.7 % (ref 41–53)
HDLC SERPL-MCNC: 33 MG/DL
HEMOGLOBIN: 12.7 G/DL (ref 13.5–17.5)
IMMATURE GRANULOCYTES: ABNORMAL %
LDL CHOLESTEROL: 82 MG/DL (ref 0–130)
LYMPHOCYTES # BLD: 14 % (ref 24–44)
MAGNESIUM: 1.9 MG/DL (ref 1.6–2.6)
MCH RBC QN AUTO: 27.6 PG (ref 26–34)
MCHC RBC AUTO-ENTMCNC: 32.8 G/DL (ref 31–37)
MCV RBC AUTO: 84.1 FL (ref 80–100)
MONOCYTES # BLD: 8 % (ref 1–7)
NRBC AUTOMATED: ABNORMAL PER 100 WBC
PDW BLD-RTO: 15.5 % (ref 11.5–14.9)
PHOSPHORUS: 4.2 MG/DL (ref 2.5–4.5)
PLATELET # BLD: 182 K/UL (ref 150–450)
PLATELET ESTIMATE: ABNORMAL
PMV BLD AUTO: 9 FL (ref 6–12)
POTASSIUM SERPL-SCNC: 3.8 MMOL/L (ref 3.7–5.3)
PRO-BNP: 223 PG/ML
RBC # BLD: 4.6 M/UL (ref 4.5–5.9)
RBC # BLD: ABNORMAL 10*6/UL
SEG NEUTROPHILS: 75 % (ref 36–66)
SEGMENTED NEUTROPHILS ABSOLUTE COUNT: 7.3 K/UL (ref 1.3–9.1)
SODIUM BLD-SCNC: 140 MMOL/L (ref 135–144)
TOTAL PROTEIN: 7.3 G/DL (ref 6.4–8.3)
TRIGL SERPL-MCNC: 180 MG/DL
VITAMIN B-12: 651 PG/ML (ref 232–1245)
VLDLC SERPL CALC-MCNC: ABNORMAL MG/DL (ref 1–30)
WBC # BLD: 9.6 K/UL (ref 3.5–11)
WBC # BLD: ABNORMAL 10*3/UL

## 2020-09-25 PROCEDURE — 82607 VITAMIN B-12: CPT

## 2020-09-25 PROCEDURE — 85025 COMPLETE CBC W/AUTO DIFF WBC: CPT

## 2020-09-25 PROCEDURE — 83036 HEMOGLOBIN GLYCOSYLATED A1C: CPT

## 2020-09-25 PROCEDURE — 83880 ASSAY OF NATRIURETIC PEPTIDE: CPT

## 2020-09-25 PROCEDURE — 80061 LIPID PANEL: CPT

## 2020-09-25 PROCEDURE — 82746 ASSAY OF FOLIC ACID SERUM: CPT

## 2020-09-25 PROCEDURE — 84100 ASSAY OF PHOSPHORUS: CPT

## 2020-09-25 PROCEDURE — 36415 COLL VENOUS BLD VENIPUNCTURE: CPT

## 2020-09-25 PROCEDURE — 80053 COMPREHEN METABOLIC PANEL: CPT

## 2020-09-25 PROCEDURE — 83735 ASSAY OF MAGNESIUM: CPT

## 2020-09-25 NOTE — RESULT ENCOUNTER NOTE
Fozia comment sent to patient.   slightly worsening lipids, continue Pravachol  Stable Chronic kidney disease stage 3  LOW Blood Glucose !!!  STABLE ANEMIA    Future Appointments  9/30/2020  8:10 AM    ST MEDICATION MGMT       ST MED MGMT   [facility]  12/23/2020 8:00 AM    Santiago Jasmine MD     [facility]  2/23/2021  8:00 AM    [facility]     [facility]

## 2020-09-28 NOTE — RESULT ENCOUNTER NOTE
Fozia comment sent to patient.   Vitamin E48 and folic acid normal  Future Appointments  9/30/2020  8:10 AM    [facility] MEDICATION MGMT       [facility] MGMT   [facility]  12/23/2020 8:00 AM    Elizabeth Olivera MD     [facility]  2/23/2021  8:00 AM    Elizabeth Olivera MD     [facility]

## 2020-09-29 ENCOUNTER — TELEPHONE (OUTPATIENT)
Dept: PHARMACY | Age: 55
End: 2020-09-29

## 2020-09-29 LAB
ESTIMATED AVERAGE GLUCOSE: 163 MG/DL
HBA1C MFR BLD: 7.3 % (ref 4–6)

## 2020-09-29 NOTE — RESULT ENCOUNTER NOTE
Fozia comment sent to patient.   A1c 7.3, greatly improved, great job  Future Appointments  9/30/2020  8:10 AM    [facility] MEDICATION MGMT       [facility] MGMT   [facility]  12/23/2020 8:00 AM    [facility] Moyer [facility]  2/23/2021  8:00 AM    [facility]

## 2020-09-29 NOTE — TELEPHONE ENCOUNTER
Spoke with patient for COVID 19 screening secondary to upcoming appointment tomorrow . At this time patient denies symptoms, recent (previous 14 days) positive covid test, recent travel and exposure. Patient will report to [facility] at scheduled time. Patient educated to call physician or go to ER with respiratory symptoms or fever and to not present to appointment if symptomatic. Will coordinate for next appointment accordingly.      Pagie Gutierrez, PharmD, 9/29/2020 3:20 PM

## 2020-09-30 ENCOUNTER — HOSPITAL ENCOUNTER (OUTPATIENT)
Dept: PHARMACY | Age: 55
Setting detail: THERAPIES SERIES
Discharge: HOME | End: 2020-09-30
Payer: COMMERCIAL

## 2020-09-30 ENCOUNTER — PATIENT MESSAGE (OUTPATIENT)
Dept: FAMILY MEDICINE CLINIC | Age: 55
End: 2020-09-30

## 2020-09-30 VITALS — BODY MASS INDEX: 56.53 KG/M2 | TEMPERATURE: 97.1 F | WEIGHT: 315 LBS

## 2020-09-30 PROCEDURE — 99213 OFFICE O/P EST LOW 20 MIN: CPT

## 2020-09-30 RX ORDER — BLOOD SUGAR DIAGNOSTIC
STRIP MISCELLANEOUS
Qty: 300 EACH | Refills: 2 | OUTPATIENT
Start: 2020-09-30 | End: 2021-06-28 | Stop reason: SDUPTHER

## 2020-09-30 RX ORDER — NALOXONE HYDROCHLORIDE 4 MG/.1ML
1 SPRAY NASAL PRN
Qty: 1 EACH | Refills: 5 | Status: SHIPPED | OUTPATIENT
Start: 2020-09-30 | End: 2021-09-21

## 2020-09-30 RX ORDER — VARENICLINE TARTRATE 1 MG/1
1 TABLET, FILM COATED ORAL 2 TIMES DAILY
Qty: 180 TABLET | Refills: 0 | OUTPATIENT
Start: 2020-09-30 | End: 2020-12-02 | Stop reason: SDUPTHER

## 2020-09-30 RX ORDER — OXYCODONE HYDROCHLORIDE 10 MG/1
10 TABLET ORAL EVERY 8 HOURS PRN
Qty: 90 TABLET | Refills: 0 | Status: SHIPPED | OUTPATIENT
Start: 2020-09-30 | End: 2020-10-28 | Stop reason: SDUPTHER

## 2020-09-30 RX ORDER — GLUCOSAMINE HCL/CHONDROITIN SU 500-400 MG
CAPSULE ORAL
Qty: 300 STRIP | Refills: 0 | OUTPATIENT
Start: 2020-09-30 | End: 2021-11-01 | Stop reason: SDUPTHER

## 2020-09-30 RX ORDER — LANCETS 30 GAUGE
EACH MISCELLANEOUS
Qty: 300 EACH | Refills: 2 | OUTPATIENT
Start: 2020-09-30

## 2020-09-30 NOTE — TELEPHONE ENCOUNTER
From: Nate Cristobal.   To: Zara Zacarias MD  Sent: 9/30/2020 7:15 AM EDT  Subject: Non-Urgent Medical Question    Can you please Refill my Oxycodone 10 mg One every 8 hours thank you have a good day

## 2020-09-30 NOTE — PROGRESS NOTES
Diabetic Medication Management   [address]. [place], [address] [facility]  Phone: [phone]  Fax: [phone]    NAME: Oli Raya. MEDICAL RECORD NUMBER:  [id number]  AGE: 53 y.o. GENDER: male  : 1965  EPISODE DATE:  2020       Mr. Kisha Martell was referred to [facility] by Dr. Maurisio Willams, Special instructions include: titrate all medications (as defined in clinic's policy and procedure)    Visit completed over phone to reduce patient exposure due to COVID-19    Goals per referral:   Fasting blood glucose: < 130  Peak postprandial glucose: < 180  A1C: < 7    Other goals:  Blood pressure goal: 130/80  Weight loss goal (~10%): Target weight  410 to be reached by date: Dec 2020 (3-6 months)  Physical Activity goal:    10 minutes three times per week to be reached by date: Dec 2020 (3-6 months)  Smoking Cessation   Quit Date: Currently using Chantix  Cholesterol at target:   Date: Yes LDL 82  HDL 33 Trig 180 (20)  Annual eye exam:    Date: No due to covid  Comprehensive annual foot exam:   Date: No due to covid  Annual urine Albumin and serum creatinine:   Date: Needs updated microalbumin 15 mg/L (2018), SrCr 1.38 mg/dL (2020)        Subjective   Mr. Kisha Martell is a 53 y.o. male here for the Diabetes Service for self-management education, medication review including over the counter medications and herbal products, overall well-being assessment, transition of care and any needed adjustments with updates and recommendations communicated to the referring physician. Patient's name and  verified. Patient Findings:   Patient feels may have to move as environment at home is not supportive to him. He is staying in his bedroom to avoid contact with other family members. Also is concerned as he has a court date on 10/22 due to [facility] rage\" per patient.   He states if they assess a fine he will be unable to pay for it so may have to go to [facility]. Is hoping he can avoid [facility] time due to health issues. Patient will keep us informed as much as possible if he is unable to come to next appt due to incarceration. Patient not eating consistently but states he always eats breakfast but often lunch is late (3-4pm) and late dinner (8-9pm). Dinner is meal he is most likely to skip as he does not like to eat late at night. Patient states he is not eating lunch as he just doesn't want to worry about it or he does not have anything healthy or just doesn't want to leave room. Suggested he have some wheat bread and peanut butter in his room to eat at lunch time if he is unable/unwilling to have anything else. Couple days patient has not smoked at all but normally 5-10 cig/day currently. Smoking due to anxiety / depression. If he leaves cigarettes in truck or dining room doesn't smoke as much. Only buying one one pack at a time as this helps him decrease also. Encouraged patient to only place 5 cigs in his pack and leave in dining room with rest of pack in the truck so as he will have to intentionally go to truck to get more than 5 per day. Patient states he can do this with goal of completely quitting smoking in a couple weeks. States Chantix is helping and cigs are not as enjoyable. Will send in refill of Chantix to pharmacy. Patient reports being a little more SOB. Using inhalers with spacer as instructed and feels the medication is getting to his lungs. Using nebulizer with albuterol 2-3 times per day and states it helps but only for a very short time. Patient does admit he is not taking his bumex as instructed as does not want to leave his room to go to bathroom so often. States he takes it about 2-3 times a week. Weight has gone up since last visit but last in person visit was before Covid. Patient admits weight has progressively gone up and understands build up of fluid can contribute to SOB.   Encouraged him to at least take once a day and discuss with physician. Patient has not seen pulmonologist in over a year and suggested he make an appt. Patient has continued checking blood sugar three times a day along with checking after any episodes of hypoglycemia. Taking insulin as instructed with no missed doses. Patient still \"down\" and feels like just isolating in his room much of the time. Discussed him calling to resume seeing the counselor. Patient admits this was helping him quite a bit in the past and agrees to consider calling to make an appt. Patient states wound has completely healed. []  Missed doses   []  Emergency Room Visit    []  Medication changes  []  Hospitalization   [x]  Diet changes   []  Acute illness   []  Activity changes      Symptoms of hypoglycemia    []  None    []  Shakiness    []  Lightheadedness or dizziness   [x]  Confusion      []  Sweating   [x]  Other -sluggish       Symptoms of hyperglycemia -.    [x]  None   []  Frequent urination    []  Increased thirst   []  Other    Medication adverse reactions (none due to diabetic medicaitons)   [x]  None   []  Diarrhea / Nausea / Vomiting / Constipation / flatulence   []  Hypertension   []  Peripheral edema     []  Signs of infection   []  Headache   []  Vision changes   []  Increased cholesterol    []  Weight gain   []  Change in renal function   []  Increased potassium  []  Other      Comments:  Patient takes Humulin R U500. Patient has been taking Humulin R U500 0.5ml with breakfast, 0.55ml with lunch and 0.5 with dinner. If recent hospital admission / ED visit, was this related to Diabetes No:Cellulitis.  Discharge date 6/12/19    Objective     PMHx:    Past Medical History:   Diagnosis Date    Acute bronchitis with chronic obstructive pulmonary disease (COPD) [facility]     Acute on chronic kidney failure ([facility] 7/20/2017    Acute on chronic respiratory failure ([facility] 10/2/2018    Adhesive capsulitis of left shoulder 3/25/2017    Anxiety 10/2/2016    Arthropathy, unspecified, other specified sites 6/13/2013    Asthma     Bilateral lower leg cellulitis 2/17/2016    Blood in stool     CAD (coronary artery disease)     Cellulitis of both lower extremities 5/25/2017    Cellulitis of leg, left 07/20/2017    CHF (congestive heart failure), NYHA class III ([facility]) 8/14/2013    Chronic back pain     Chronic bronchitis ([facility])     Chronic headaches     was referred to neuro, testing scheduled    Chronic kidney disease     Chronic respiratory failure ([facility])     Chronic ulcer of left leg, with fat layer exposed ([address] 2/22/2019    Class 2 severe obesity due to excess calories with serious comorbidity and body mass index (BMI) of 35.0 to 35.9 in adult ([facility])     (BMI 35.0-39.9 without comorbidity)    COPD (chronic obstructive pulmonary disease) ([address]     COPD exacerbation ([address] 11/2/2016    Diabetic neuropathy ([address] 8/14/2013    Displacement of lumbar intervertebral disc without myelopathy 6/13/2013    Ear infection     RIGHT    Essential hypertension     Facial cellulitis 2012    Fall 3/25/2017    Former smoker     GERD (gastroesophageal reflux disease)     Head injury     Hearing loss in right ear     pencil pierced ear as a child    Hepatic steatosis 12/3/2015    History of general anesthesia complication     has woke up during surgery under anesthesia    History of rib fracture 12/3/2015    Chronic     Hyperlipidemia     Hypersomnia     Hypertension     Insomnia     Intolerance of continuous positive airway pressure (CPAP) ventilation 7/20/2017    Mastoiditis of left side     Mixed conductive and sensorineural hearing loss of both ears 1/10/2017    Per ENT    Mixed type COPD (chronic obstructive pulmonary disease) ([address]     Moderate recurrent major depression ([address] 10/2/2016    Morbid obesity with BMI of 45.0-49.9, adult ([address] 6/16/2015    Neuropathy     Open wound of groin 12/19/2018    BARBY on CPAP  BARBY treated with BiPAP     Osteoarthritis     Otitis externa of left ear     Pancreatitis chronic     Persistent depressive disorder 2019    Renal insufficiency     proteinuria    S/P cardiac cath 2018    Severe depression ([address] 2013    Spinal stenosis of lumbar region without neurogenic claudication 2016    MRI lumbar 12/30/15 L3-L4: There is broad-based bulging disc which appears protruding left laterally causing flattening of the ventral thecal sac. In addition, there is facet arthropathy with mild hypertrophic changes.  There is borderline central canal stenosis with  evidence of moderate left neural foraminal narrowing and mild right neural foramina narrowing.   L4-L5: There is broad-based protrud    Syncope 2017    Tinnitus of both ears 1/10/2017    Per ENT    Type 2 diabetes mellitus with stage 3 chronic kidney disease, with long-term current use of insulin ([facility] 2016    due to underlying condition with hyperosmolarity without coma    Type II or unspecified type diabetes mellitus without mention of complication, not stated as uncontrolled     uncontrolled    Wears glasses     for reading    Wound of left leg 2019       Social History:    Social History     Tobacco Use    Smoking status: Former Smoker     Packs/day: 0.25     Years: 33.00     Pack years: 8.25     Types: Cigarettes     Start date: 1985     Last attempt to quit: 2020     Years since quittin.7    Smokeless tobacco: Former User     Types: Snuff     Quit date: 1995   Substance Use Topics    Alcohol use: No     Alcohol/week: 0.0 standard drinks       Pertinent Labs:    Lab Results   Component Value Date    LABA1C 7.3 (H) 2020    LABA1C 9.5 (H) 2020    LABA1C 7.3 2020     Lab Results   Component Value Date    CHOL 151 2020    TRIG 180 (H) 2020    HDL 33 (L) 2020     Lab Results   Component Value Date    CREATININE 1.38 (H) 2020    BUN 27 (H) 09/25/2020     09/25/2020    K 3.8 09/25/2020     09/25/2020    CO2 26 09/25/2020     Lab Results   Component Value Date    ALT 16 09/25/2020       Weight: Patient states weight from home scale is 401lbs. Wt Readings from Last 3 Encounters:   09/30/20 (!) 416 lb 12.8 oz (189.1 kg)   02/20/20 (!) 406 lb (184.2 kg)   02/13/20 (!) 414 lb 11.2 oz (188.1 kg)       Current medications:  Prior to Admission medications    Medication Sig Start Date End Date Taking? Authorizing Provider   oxyCODONE HCl (OXY-IR) 10 MG immediate release tablet Take 1 tablet by mouth every 8 hours as needed for Pain for up to 30 days. 9/30/20 10/30/20  Melissa Wilson MD   naloxone 4 MG/0.1ML LIQD nasal spray 1 spray by Nasal route as needed for Opioid Reversal Due to COPD and sleep apnea, this is a big recommendation for you 9/30/20   Melissa Wilson MD   neomycin-polymyxin-hydrocortisone (CORTISPORIN) 3.5-06539-3 otic suspension Place 3 drops into both ears 4 times daily OK to substitute. For 10 days 9/16/20   Melissa Wilson MD   cephALEXin (KEFLEX) 500 MG capsule Take 1 capsule by mouth 4 times daily 9/16/20 9/30/20  Melissa Wilson MD   varenicline (CHANTIX STARTING MONTH PAK) 0.5 MG X 11 & 1 MG X 42 tablet Take by mouth. 9/8/20   Melissa Wilson MD   oxyCODONE HCl (OXY-IR) 10 MG immediate release tablet Take 1 tablet by mouth every 8 hours as needed for Pain for up to 30 days.  9/1/20 9/30/20  Melissa Wilson MD   ketoconazole (NIZORAL) 2 % cream Apply topically  2 times a day x 4-6 weeks, right palm 8/31/20   Melissa Wilson MD   vitamin D3 (CHOLECALCIFEROL) 25 MCG (1000 UT) TABS tablet Take 1 tablet by mouth daily 8/26/20   Melissa Wilson MD   clopidogrel (PLAVIX) 75 MG tablet TAKE ONE TABLET BY MOUTH DAILY 7/29/20   Melissa Wilson MD   vitamin B-12 (CYANOCOBALAMIN) 500 MCG tablet Take 1 tablet by mouth daily 7/13/20   Melissa Wilson MD   magnesium oxide (MAG-OX) 400 (240 Mg) MCG/ACT inhaler INHALE TWO PUFFS BY MOUTH TWICE A DAY 2/3/20   Karina Blanco MD   pantoprazole (PROTONIX) 40 MG tablet Take 1 tablet by mouth nightly 1/15/20   Karina Blanco MD   docusate sodium (STOOL SOFTENER) 100 MG capsule Take 1 capsule by mouth 2 times daily 1/15/20   Karina Blanco MD   diclofenac sodium (VOLTAREN) 1 % GEL Apply 2 g topically 2 times daily 12/5/19 9/30/20  Esperanza Lucia, APRN - CNP   Ferrous Sulfate (IRON) 325 (65 Fe) MG TABS Take 1 tablet by mouth daily 12/2/19   Karina Blanco MD   naloxone 4 MG/0.1ML LIQD nasal spray 1 spray by Nasal route as needed for Opioid Reversal 11/19/19 9/30/20  Karina Blanco MD   ONE TOUCH ULTRASOFT LANCETS [facility] Patient to test blood sugar up to 4 times daily. 11/7/19   Vidya Smith MD   lisinopril (PRINIVIL;ZESTRIL) 5 MG tablet Take 1 tablet by mouth daily . Discontinued Lisinopril  10 mg 10/10/19   Karina Blanco MD   COMFORT ASSIST INSULIN SYRINGE 31G X 5/16\" 1 ML MISC Use to subcutaneously inject insulin three times daily 10/10/19   Kairna Blanco MD   tiotropium (SPIRIVA RESPIMAT) 2.5 MCG/ACT AERS inhaler Inhale 2 puffs into the lungs daily 10/10/19   Karina Blanco MD   bumetanide (BUMEX) 1 MG tablet Take 3 tablets by mouth 2 times daily  Patient not taking: Reported on 9/30/2020 10/10/19   Karina Blanco MD   metoprolol tartrate (LOPRESSOR) 50 MG tablet Take 1 tablet by mouth 2 times daily 10/10/19   Karina Blanco MD   nystatin (MYCOSTATIN) 809876 UNIT/GM powder Apply 2-3 times daily in skin folds. 6/19/19   Karina Blanco MD   Lancet Devices (LANCING DEVICE) MISC Provide patient with lancing device appropriate for his machine/lancing needles.  6/4/19   Karina Blanco MD   Lidocaine 4 % LOTN Apply topically    Historical Provider, MD   Spacer/Aero Chamber Mouthpiece MISC 1 each by Does not apply route once as needed (to be used with his inhalers) 4/15/19 6/10/20  Karina Blanco MD metolazone (ZAROXOLYN) 2.5 MG tablet Take 2.5 mg by mouth three times a week MWF    Historical Provider, MD   PROAIR  (90 Base) MCG/ACT inhaler INHALE TWO PUFFS BY MOUTH EVERY 6 HOURS AS NEEDED FOR WHEEZING OR SHORTNESS OF BREATH 3/27/19   Dayna Davis MD   Handicap Placard MISC by Does not apply route Can't walk greater than 200 feet. Expires in 5 years. 2/13/19   Paige Bryant MD   miconazole (MICOTIN) 2 % powder Apply topically 2 times daily. 12/20/18   Prabhakar Rodrigues MD   fluticasone (CUTIVATE) 0.05 % cream Apply topically 2 times daily  4/19/17   Historical Provider, MD   fluticasone (FLONASE) 50 MCG/ACT nasal spray 2 sprays by Nasal route daily  Patient taking differently: 2 sprays by Nasal route daily as needed (sinus symptoms)  1/16/17   Emmie Rosenthal MD   Melatonin 10 MG TABS Take 10 mg by mouth nightly as needed (insomnia) 12/23/16   Emmie Rosenthal MD   aspirin 81 MG EC tablet Take 81 mg by mouth daily. Historical Provider, MD       Immunizations:   Most Recent Immunizations   Administered Date(s) Administered    DT (pediatric) 12/14/1998    Hepatitis B Adult (Engerix-B) 12/04/2019    Hepatitis B Adult (Recombivax HB) 12/04/2019    Influenza Virus Vaccine 10/12/2015    Influenza, Quadv, IM, PF (6 mo and older Fluzone, Flulaval, Fluarix, and 3 yrs and older Afluria) 10/04/2019    Pneumococcal Polysaccharide (Vkphbgthn00) 05/23/2013    Tdap (Boostrix, Adacel) 09/12/2018       Home Blood Glucose Results:   Patient brought in glucose log. Fasting: (oldest to newest) 94,73,107,103,101,104,63,119,112,108,58/142,130,106,100,58/139,77    Before lunch (oldest to newest)134,176,152,140,172,180,113,144,108,137,103,145,167,160,190    Before dinner (oldest to newest) 201,170,197,95,154,202,156,121,130,110,158,76/119,51/160,173,98,151     Blood glucose trends noted: see above. Patients fasting blood sugar is controlled although some hypoglycemia from skipping / light meals. States he is more likely to eat late lunch and skip dinner but always eats breakfast.  Patient will trying having lunch but will decrease dose of dinner insulin to avoid more hypoglycemia. Thanked patient for checking blood sugar after hypoglycemia and noting on log. Assessment     A1c at goal:No:  7.3 (9/25/20) (decreasing)  Blood Pressure at goal: not done in office today  Weight at goal: No: increasing. Unsure if due to eating or edema. Physical activity: No  Physical activity at goal: No  Patient encouraged but unable to due SOB. Smoking Cessation: No but attempting to quite again    Cholesterol at target: Yes  Annual eye exam completed: No - TBD  Comprehensive Foot Exam Completed: No - TBD  Annual urine albumin and serum creatinine: Yes for SrCr but needs microalbumin. Agrees to get microalbumin at next appt. Statin: Yes    Appropriate?: Yes  Changes made:     ACE/ARB: Yes  Appropriate?: Yes  Changes made:     Aspirin: Yes  Appropriate?: Yes  Changes made:     Eating patterns:    [x]  My plate    []  Mediterranean diet   []  Low sodium   []  DASH diet   [x]  Portion control   [x]  Reduced calorie    []  Fast food / Restaurant  []  High glycemic index foods   []  Sugary beverages   []  Other     Comment: see above    Current Medications Affecting Diabetes:  Humulin R 500    Compliant: Yes  Barriers to medication therapy: anxiety / depression    Medications attempted in the past:  Metformin - cardiologist took him off but patient unsure why  Januvia - PCP took him off but patient unsure why    Recent exacerbations / new problems:  Infection / fall - ankle pain    Last office dictation reviewed: yes        type 2 DM under improving control as evidenced by A1C 7.3 on 9/25/20    Plan       Counseling at today's visit:   -Continued monitoring of blood glucose with documentation on log three times a day. -Continued dose of insulin:0.5ml with breakfast, 0.55ml with lunch.   Will decrease to 0.45ml  with dinner due to am hypoglycemia. -Refills of Lancets, syringes and test strips called to [facility] [address]. Refill of Chantix called as well.  -Continue Chantix and reduce to 5 cig/day. -Call podiatrist for appt (Dr. Adry Loya)    -Call pulmonologist for appt (Dr. Tejas Watson) as well as ENT and nephrologist.  -Call for eye appt. -Call to resume seeing counselor  -Get microalbumin at next visit. Physician Follow-up:  Dr. Margarita Green on 10/5/20    Medication Management Follow-up:   Diabetes Service   1 month in person     Electronically signed by Romero Soto [facility] on 9/30/2020 at 9:53 AM       CLINICAL PHARMACY CONSULT: MED RECONCILIATION/REVIEW ADDENDUM    For Pharmacy Admin Tracking Only    PHSO: No  Total # of Interventions Recommended: 7  - Decreased Dose #: 1  - Refills Provided #: 4  - Updated Order #: 2 Updated Order Reason(s):  Other  - Maintenance Safety Lab Monitoring #: 1  Total Interventions Accepted: 7  Time Spent (min): 60    Yesenia Rizvi, PharmD  [address]

## 2020-10-09 ENCOUNTER — NURSE ONLY (OUTPATIENT)
Dept: FAMILY MEDICINE CLINIC | Age: 55
End: 2020-10-09
Payer: COMMERCIAL

## 2020-10-09 PROCEDURE — 90686 IIV4 VACC NO PRSV 0.5 ML IM: CPT | Performed by: FAMILY MEDICINE

## 2020-10-09 PROCEDURE — G0008 ADMIN INFLUENZA VIRUS VAC: HCPCS | Performed by: FAMILY MEDICINE

## 2020-10-09 NOTE — PROGRESS NOTES
Vaccine Information Sheet, \"Influenza - Inactivated\"  given to [facility] or parent/legal guardian of  [facility]. and verbalized understanding. Patient responses:    Have you ever had a reaction to a flu vaccine? No  Do you have any current illness? No  Have you ever had Guillian [place] Syndrome? No  Do you have a serious allergy to any of the following: Neomycin, Polymyxin, Thimerosal, eggs or egg products? No    Flu vaccine given per order. Please see immunization tab. Risks and benefits explained. Current VIS given.

## 2020-10-13 ENCOUNTER — PATIENT MESSAGE (OUTPATIENT)
Dept: FAMILY MEDICINE CLINIC | Age: 55
End: 2020-10-13

## 2020-10-14 RX ORDER — VENLAFAXINE HYDROCHLORIDE 75 MG/1
75 CAPSULE, EXTENDED RELEASE ORAL DAILY
Qty: 90 CAPSULE | Refills: 3 | Status: SHIPPED | OUTPATIENT
Start: 2020-10-14 | End: 2021-10-13

## 2020-10-14 NOTE — TELEPHONE ENCOUNTER
From: Guevara Landry.   To: Belvin Cabot, MD  Sent: 10/13/2020 8:50 PM EDT  Subject: Non-Urgent Medical Question    Can you please Refill my venlafaxine [facility] mg capsule thank you

## 2020-10-15 ENCOUNTER — TELEPHONE (OUTPATIENT)
Dept: PHARMACY | Age: 55
End: 2020-10-15

## 2020-10-15 NOTE — TELEPHONE ENCOUNTER
Called to check in with patient concerning diabetes management. Patient indicates his blood sugar has been better controlled. See below. Patient saw endocrinologist on 10/5. States he was still having some lower blood sugars so endocrinologist reduced lunch time insulin from . 55 to .50. Patient states he has not had any hypoglycemia since and lowest blood sugar was 71 one evening. Fasting blood sugars overall has been 99 -113 per patient. Highest blood sugar overall was 205 but that was only time patient was above 200. Most blood sugars other than fasting have been 140-170 per patient. Patient also states he was placed on fenofibrate. He has reduced his smoking to approximately 5 cigs/day but intends to stop soon. Continues on Chantix and tolerating well. Patient has not made many of his appts as of yet (eye appt, pulmonologist, foot exam etc.) due to increase in covid cases and concern of going out. Encouraged patient to call and make appts as soon as possible. Will follow up at next scheduled appt. Patient encouraged to call with any questions/issues. Yesenia Rizvi,Pharm. D,, BCPS, CACP  10/15/2020  8:30 PM

## 2020-10-19 ENCOUNTER — PATIENT MESSAGE (OUTPATIENT)
Dept: FAMILY MEDICINE CLINIC | Age: 55
End: 2020-10-19

## 2020-10-19 NOTE — TELEPHONE ENCOUNTER
From: Marta Jordan.   To: Miriam Heath MD  Sent: 10/19/2020 9:01 AM EDT  Subject: Non-Urgent Medical Question    Will you please Refill my ADVAIR 230/21 for me thank you

## 2020-10-19 NOTE — TELEPHONE ENCOUNTER
From: Sonja Hyde. To: Rigo Salazar MD  Sent: 10/19/2020 9:07 AM EDT  Subject: Non-Urgent Medical Question    Sorry I forgot about my Spiriva Respimat 2. 5!  Mcg / actuation needs refilled also thank you

## 2020-10-28 ENCOUNTER — PATIENT MESSAGE (OUTPATIENT)
Dept: FAMILY MEDICINE CLINIC | Age: 55
End: 2020-10-28

## 2020-10-28 RX ORDER — OXYCODONE HYDROCHLORIDE 10 MG/1
10 TABLET ORAL EVERY 8 HOURS PRN
Qty: 90 TABLET | Refills: 0 | Status: SHIPPED | OUTPATIENT
Start: 2020-10-28 | End: 2020-11-24 | Stop reason: SDUPTHER

## 2020-10-28 NOTE — TELEPHONE ENCOUNTER
From: Linwood Casas.   To: Jan Merida MD  Sent: 10/28/2020 1:10 PM EDT  Subject: Non-Urgent Medical Question    Can you please Refill my Oxycodone 10 mg One every 8 hours and my Lisinopril 5 mg Tablet take one a day Thank you and have a great day

## 2020-11-01 ENCOUNTER — PATIENT MESSAGE (OUTPATIENT)
Dept: FAMILY MEDICINE CLINIC | Age: 55
End: 2020-11-01

## 2020-11-02 RX ORDER — LISINOPRIL 5 MG/1
5 TABLET ORAL DAILY
Qty: 90 TABLET | Refills: 3 | Status: SHIPPED | OUTPATIENT
Start: 2020-11-02 | End: 2021-11-01 | Stop reason: ALTCHOICE

## 2020-11-02 NOTE — TELEPHONE ENCOUNTER
Lab Results   Component Value Date     09/25/2020    K 3.8 09/25/2020     09/25/2020    CO2 26 09/25/2020    BUN 27 09/25/2020    CREATININE 1.38 09/25/2020    GLUCOSE 54 09/25/2020    CALCIUM 9.3 09/25/2020

## 2020-11-03 ENCOUNTER — TELEPHONE (OUTPATIENT)
Dept: PHARMACY | Age: 55
End: 2020-11-03

## 2020-11-03 NOTE — TELEPHONE ENCOUNTER
Attempted to call patient for COVID 19 screening secondary to upcoming appointment tomorrow . At this time patient is unavailable with either no answer or no voicemail available. Patient will report to [place] at scheduled time. Patient will be screened at entry to hospital for fever. Will coordinate for next appointment accordingly.      Severo Gibbon, PharmD, 11/3/2020 2:09 PM

## 2020-11-04 ENCOUNTER — TELEPHONE (OUTPATIENT)
Dept: FAMILY MEDICINE CLINIC | Age: 55
End: 2020-11-04

## 2020-11-04 ENCOUNTER — HOSPITAL ENCOUNTER (OUTPATIENT)
Dept: PHARMACY | Age: 55
Setting detail: THERAPIES SERIES
Discharge: HOME | End: 2020-11-04
Payer: COMMERCIAL

## 2020-11-04 VITALS — TEMPERATURE: 96.8 F | BODY MASS INDEX: 55.76 KG/M2 | WEIGHT: 315 LBS

## 2020-11-04 PROCEDURE — 99213 OFFICE O/P EST LOW 20 MIN: CPT

## 2020-11-04 RX ORDER — METOPROLOL TARTRATE 50 MG/1
50 TABLET, FILM COATED ORAL 2 TIMES DAILY
Qty: 180 TABLET | Refills: 3 | Status: SHIPPED | OUTPATIENT
Start: 2020-11-04 | End: 2021-11-17 | Stop reason: SDUPTHER

## 2020-11-04 RX ORDER — LANOLIN ALCOHOL/MO/W.PET/CERES
400 CREAM (GRAM) TOPICAL DAILY
Qty: 90 TABLET | Refills: 3 | Status: SHIPPED | OUTPATIENT
Start: 2020-11-04 | End: 2021-11-17 | Stop reason: SDUPTHER

## 2020-11-04 NOTE — PROGRESS NOTES
Diabetic Medication Management   [address]    [address] [facility]. [place], [address] [facility]  Phone: [phone]  Fax: [phone]    NAME: Lang Green. MEDICAL RECORD NUMBER:  [id number]  AGE: 53 y.o. GENDER: male  : 1965  EPISODE DATE:  2020       Mr. Tee Arnold was referred to [facility] by Dr. Srinivasa Curry, Special instructions include: titrate all medications (as defined in clinic's policy and procedure)    Visit completed in person. Goals per referral:   Fasting blood glucose: < 130  Peak postprandial glucose: < 180  A1C: < 7    Other goals:  Blood pressure goal: 130/80  Weight loss goal (~10%): Target weight  410 to be reached by date: Dec 2020 (3-6 months)  Physical Activity goal:    10 minutes three times per week to be reached by date: Dec 2020 (3-6 months)  Smoking Cessation   Quit Date: Stopped 20. Currently using Chantix  Cholesterol at target:   Date: Yes LDL 82  HDL 33 Trig 180 (20)  Annual eye exam:    Date: Has appt scheduled 2020. Comprehensive annual foot exam:   Date: No due to covid  Annual urine Albumin and serum creatinine:   Date: Needs updated microalbumin 15 mg/L (2018), SrCr 1.38 mg/dL (2020)        Subjective   Mr. Tee Arnold is a 53 y.o. male here for the Diabetes Service for self-management education, medication review including over the counter medications and herbal products, overall well-being assessment, transition of care and any needed adjustments with updates and recommendations communicated to the referring physician. Patient's name and  verified. Patient Findings:   Patient having increase in abdominal pain (lower abdomen/groin region). Patient states has been much worse the last week. Has been to both PCP and specialist (urologist) and doesn't feel anyone can do anything.  States he is too large to have appropriate radiologic investigation and was told by urologist he is not surgical candidate due to other issues. Patient very frustrated and feels lack of control due to pain. Breathing / SOB is about the same. Patient reports today is 3rd day abstaining from smoking. Still taking Chantix. Prior to stopping patient was smoking about 5-6 cig a day. Patient had been doing gradual quit method. Patient states the last few days it has not been very hard to avoid smoking. Does not allow son or daughter to smoke in house any longer which is helpful. Patient does not have any cigarettes at home. Congratulated patient and encouraged continued abstinence. Patient did attend court date on 10/22. Was given probation and not put in [facility] due to medical issues per patient. Did loose driving [place] for 1 yr but was given a waiver to drive for [place]. Patient has been eating less overall for last week due to abdominal pain. Eating chicken broth and liquids do not hurt as much. Also drinking pedialyte to help keep electrolytes up as much as possible. Admits to skipping meals all together at times but overall eating less. Thinks he is skipping dinner a bit less than before but often eating late - approximately 10/10:30pm.    Patient reports he is taking his bumex but is only taking once a day rather than twice. This is improvement as patient was previously not taking almost at all. Reports having less chest pain and breathing better since starting taking. Takes at night so that he is up using bathroom during night rather than during day to avoid contact with family members. Still trying to keep to himself in his room most times. Patient reduced insulin dose as instructed at endocrinology appt and as documented earlier to 0.5ml, 0.5ml, 0.45ml (breakfast, lunch and dinner). Patient has not had any further hypoglycemic episodes. Lowest blood sugar since change was 81 which was this AM.  Patients lunch time and dinner blood sugars have increased slightly.   No missed doses of insulin. Patient has resumed seeing the counselor but does not feel it is helping as much as before. Patient also has eye appt scheduled for later this month. Patient did go to ENT but was told he would have to go back to original ENT. Issue is that old ENT is unwilling to see him he states due to not being seen for length of time. Patient plans to explain situation to original E[facility] in hopes of getting appt. Patient still needs appt for foot exam.      []  Missed doses   []  Emergency Room Visit    []  Medication changes  []  Hospitalization   [x]  Diet changes   []  Acute illness   []  Activity changes      Symptoms of hypoglycemia    [x]  None    []  Shakiness    []  Lightheadedness or dizziness   []  Confusion      []  Sweating   [x]  Other -sluggish       Symptoms of hyperglycemia -.    [x]  None   []  Frequent urination    []  Increased thirst   []  Other    Medication adverse reactions (none due to diabetic medicaitons)   [x]  None   []  Diarrhea / Nausea / Vomiting / Constipation / flatulence   []  Hypertension   []  Peripheral edema     []  Signs of infection   []  Headache   []  Vision changes   []  Increased cholesterol    []  Weight gain   []  Change in renal function   []  Increased potassium  []  Other      Comments:  Patient takes Humulin R U500. Patient has been taking Humulin R U500 0.5ml with breakfast, 0.5ml with lunch and 0.45 with dinner. If recent hospital admission / ED visit, was this related to Diabetes No:Cellulitis.  Discharge date 6/12/19    Objective     PMHx:    Past Medical History:   Diagnosis Date    Acute bronchitis with chronic obstructive pulmonary disease (COPD) ([facility]     Acute on chronic kidney failure ([facility] 7/20/2017    Acute on chronic respiratory failure ([address] 10/2/2018    Adhesive capsulitis of left shoulder 3/25/2017    Anxiety 10/2/2016    Arthropathy, unspecified, other specified sites 6/13/2013    Asthma     Bilateral lower leg cellulitis 2/17/2016    Blood in stool     CAD (coronary artery disease)     Cellulitis of both lower extremities 5/25/2017    Cellulitis of leg, left 07/20/2017    CHF (congestive heart failure), NYHA class III ([facility]) 8/14/2013    Chronic back pain     Chronic bronchitis ([facility])     Chronic headaches     was referred to neuro, testing scheduled    Chronic kidney disease     Chronic respiratory failure ([facility])     Chronic ulcer of left leg, with fat layer exposed ([address] 2/22/2019    Class 2 severe obesity due to excess calories with serious comorbidity and body mass index (BMI) of 35.0 to 35.9 in adult ([facility])     (BMI 35.0-39.9 without comorbidity)    COPD (chronic obstructive pulmonary disease) ([address]     COPD exacerbation ([address] 11/2/2016    Diabetic neuropathy ([address] 8/14/2013    Displacement of lumbar intervertebral disc without myelopathy 6/13/2013    Ear infection     RIGHT    Essential hypertension     Facial cellulitis 2012    Fall 3/25/2017    Former smoker     GERD (gastroesophageal reflux disease)     Head injury     Hearing loss in right ear     pencil pierced ear as a child    Hepatic steatosis 12/3/2015    History of general anesthesia complication     has woke up during surgery under anesthesia    History of rib fracture 12/3/2015    Chronic     Hyperlipidemia     Hypersomnia     Hypertension     Insomnia     Intolerance of continuous positive airway pressure (CPAP) ventilation 7/20/2017    Mastoiditis of left side     Mixed conductive and sensorineural hearing loss of both ears 1/10/2017    Per ENT    Mixed type COPD (chronic obstructive pulmonary disease) ([address]     Moderate recurrent major depression ([address] 10/2/2016    Morbid obesity with BMI of 45.0-49.9, adult ([address] 6/16/2015    Neuropathy     Open wound of groin 12/19/2018    BARBY on CPAP     BARBY treated with BiPAP     Osteoarthritis     Otitis externa of left ear     Pancreatitis chronic     Persistent depressive disorder 11/19/2019    Renal insufficiency     proteinuria    S/P cardiac cath 04/23/2018    Severe depression ([address] 9/25/2013    Spinal stenosis of lumbar region without neurogenic claudication 1/6/2016    MRI lumbar 12/30/15 L3-L4: There is broad-based bulging disc which appears protruding left laterally causing flattening of the ventral thecal sac. In addition, there is facet arthropathy with mild hypertrophic changes.  There is borderline central canal stenosis with  evidence of moderate left neural foraminal narrowing and mild right neural foramina narrowing.   L4-L5: There is broad-based protrud    Syncope 4/28/2017    Tinnitus of both ears 1/10/2017    Per ENT    Type 2 diabetes mellitus with stage 3 chronic kidney disease, with long-term current use of insulin ([facility] 12/26/2016    due to underlying condition with hyperosmolarity without coma    Type II or unspecified type diabetes mellitus without mention of complication, not stated as uncontrolled     uncontrolled    Wears glasses     for reading    Wound of left leg 2/22/2019       Social History:    Social History     Tobacco Use    Smoking status: Former Smoker     Packs/day: 0.25     Years: 33.00     Pack years: 8.25     Types: Cigarettes     Start date: 6/22/1985     Last attempt to quit: 11/1/2020    Smokeless tobacco: Former User     Types: Snuff     Quit date: 6/22/1995   Substance Use Topics    Alcohol use: No     Alcohol/week: 0.0 standard drinks       Pertinent Labs:    Lab Results   Component Value Date    LABA1C 7.3 (H) 09/25/2020    LABA1C 9.5 (H) 07/13/2020    LABA1C 7.3 02/20/2020     Lab Results   Component Value Date    CHOL 151 09/25/2020    TRIG 180 (H) 09/25/2020    HDL 33 (L) 09/25/2020     Lab Results   Component Value Date    CREATININE 1.38 (H) 09/25/2020    BUN 27 (H) 09/25/2020     09/25/2020    K 3.8 09/25/2020     09/25/2020    CO2 26 09/25/2020     Lab Results   Component Value Date    ALT 16 09/25/2020         Wt Readings from Last 3 Encounters:   11/04/20 (!) 411 lb 1.6 oz (186.5 kg)   09/30/20 (!) 416 lb 12.8 oz (189.1 kg)   02/20/20 (!) 406 lb (184.2 kg)       Current medications:  Prior to Admission medications    Medication Sig Start Date End Date Taking? Authorizing Provider   insulin regular human (HUMULIN R U-500) 500 UNIT/ML concentrated injection vial Inject 0.5 ml with breakfast, 0.55 ml with lunch and 0.45 ml with dinner. Patient taking differently: Inject 0.52 ml with breakfast, 0.50 ml with lunch and 0.45 ml with dinner. 6/25/20  Yes Walker Tuttle MD   bumetanide (BUMEX) 1 MG tablet Take 3 tablets by mouth 2 times daily  Patient taking differently: Take 3 mg by mouth daily  10/10/19  Yes Walker Tuttle MD   lisinopril (PRINIVIL;ZESTRIL) 5 MG tablet Take 1 tablet by mouth daily . Discontinued Lisinopril  10 mg 11/2/20   Walker Tuttle MD   oxyCODONE HCl (OXY-IR) 10 MG immediate release tablet Take 1 tablet by mouth every 8 hours as needed for Pain for up to 30 days. 10/28/20 11/27/20  Walker Tuttle MD   fluticasone-salmeterol (ADVAIR HFA) 230-21 MCG/ACT inhaler Inhale 2 puffs into the lungs 2 times daily 10/19/20   Walker Tuttle MD   tiotropium (SPIRIVA RESPIMAT) 2.5 MCG/ACT AERS inhaler Inhale 2 puffs into the lungs daily 10/19/20   Walker Tuttle MD   venlafaxine (EFFEXOR XR) 75 MG extended release capsule Take 1 capsule by mouth daily Take with food 10/14/20   Walker Tuttle MD   naloxone 4 MG/0.1ML LIQD nasal spray 1 spray by Nasal route as needed for Opioid Reversal Due to COPD and sleep apnea, this is a big recommendation for you 9/30/20   Walker Tuttle MD   varenicline (CHANTIX CONTINUING MONTH GUALBERTO) 1 MG tablet Take 1 tablet by mouth 2 times daily 9/30/20   Ryann Banks MD   blood glucose monitor strips Test 3 times a day & as needed for symptoms of irregular blood glucose.  Dispense sufficient amount for indicated testing frequency plus additional to accommodate PRN testing needs. One touch Ultra blue 9/30/20   Gabriela Chavarria MD   Lancets MISC Use to check blood sugar three times daily along with when necessary due to symptoms. 9/30/20   Gabriela Chavarria MD   Insulin Syringe-Needle U-100 31G X 5/16\" 1 ML MISC Use to subcutaneously inject insulin three times daily 9/30/20   Gabriela Chavarria MD   neomycin-polymyxin-hydrocortisone (CORTISPORIN) 3.5-24347-1 otic suspension Place 3 drops into both ears 4 times daily OK to substitute. For 10 days 9/16/20   Sriram Lozoya MD   ketoconazole (NIZORAL) 2 % cream Apply topically  2 times a day x 4-6 weeks, right palm 8/31/20   Sriram Lozoya MD   vitamin D3 (CHOLECALCIFEROL) 25 MCG (1000 UT) TABS tablet Take 1 tablet by mouth daily 8/26/20   Sriram Lozoya MD   clopidogrel (PLAVIX) 75 MG tablet TAKE ONE TABLET BY MOUTH DAILY 7/29/20   Sriram Lozoya MD   vitamin B-12 (CYANOCOBALAMIN) 500 MCG tablet Take 1 tablet by mouth daily 7/13/20   Sriram Lozoya MD   magnesium oxide (MAG-OX) 400 (240 Mg) MG tablet Take 1 tablet by mouth daily 7/13/20   Sriram Lozoya MD   tiZANidine (ZANAFLEX) 4 MG tablet TAKE ONE TABLET BY MOUTH EVERY 8 HOURS AS NEEDED FOR BACK PAIN 7/8/20   Sriram Lozoya MD   escitalopram (LEXAPRO) 10 MG tablet Take 1 tablet by mouth daily 6/17/20   Sriram Lozoya MD   pravastatin (PRAVACHOL) 40 MG tablet Take 1 tablet by mouth nightly 5/20/20   Sriram Lozoya MD   albuterol (PROVENTIL) (2.5 MG/3ML) 0.083% nebulizer solution Take 3 mLs by nebulization every 6 hours as needed for Wheezing or Shortness of Breath 4/15/20   Sriram Lozoya MD   Oxygen Tubing MISC by Does not apply route DX COPD.  chronic respiratory failure 3/26/20   Sriram Lozoya MD   spironolactone (ALDACTONE) 50 MG tablet Take 1 tablet by mouth daily 3/11/20   Sriram Lozoya MD   Respiratory Therapy Supplies (NEBULIZER/TUBING/MOUTHPIECE) KIT Dx COPD needs nebulizer supplies 2/20/20   Paige Bryant, MD   nitroGLYCERIN (NITROSTAT) 0.4 MG SL tablet Place 1 tablet under the tongue every 5 minutes as needed for Chest pain (and call 911) 2/13/20   Miriam Heath MD   pantoprazole (PROTONIX) 40 MG tablet Take 1 tablet by mouth nightly 1/15/20   Miriam Heath MD   docusate sodium (STOOL SOFTENER) 100 MG capsule Take 1 capsule by mouth 2 times daily 1/15/20   Miriam Heath MD   Ferrous Sulfate (IRON) 325 (65 Fe) MG TABS Take 1 tablet by mouth daily 12/2/19   Miriam Heath MD   ONE TOUCH ULTRASOFT LANCETS [address] [facility] Patient to test blood sugar up to 4 times daily. 11/7/19   Osbaldo Diaz MD   metoprolol tartrate (LOPRESSOR) 50 MG tablet Take 1 tablet by mouth 2 times daily 10/10/19   Miriam Heath MD   nystatin (MYCOSTATIN) 767066 UNIT/GM powder Apply 2-3 times daily in skin folds. 6/19/19   Miriam Heath MD   Lancet Devices (LANCING DEVICE) MISC Provide patient with lancing device appropriate for his machine/lancing needles. 6/4/19   Miriam Heath MD   Lidocaine 4 % LOTN Apply topically    Historical Provider, MD   Spacer/Aero Chamber Mouthpiece MISC 1 each by Does not apply route once as needed (to be used with his inhalers) 4/15/19 6/10/20  Miriam Heath MD   metolazone (ZAROXOLYN) 2.5 MG tablet Take 2.5 mg by mouth three times a week [facility]    Historical Provider, MD   PROAIR  (90 Base) MCG/ACT inhaler INHALE TWO PUFFS BY MOUTH EVERY 6 HOURS AS NEEDED FOR WHEEZING OR SHORTNESS OF BREATH 3/27/19   Eugenia Duran MD   Handicap Placard MISC by Does not apply route Can't walk greater than 200 feet. Expires in 5 years. 2/13/19   Paige Bryant MD   miconazole (MICOTIN) 2 % powder Apply topically 2 times daily.  12/20/18   Tia Goncalves MD   fluticasone (CUTIVATE) 0.05 % cream Apply topically 2 times daily  4/19/17   Historical Provider, MD   fluticasone (FLONASE) 50 MCG/ACT nasal spray 2 sprays by Nasal route daily  Patient taking differently: 2 sprays by Nasal route daily as needed (sinus symptoms)  1/16/17   Carolina Jalloh MD   Melatonin 10 MG TABS Take 10 mg by mouth nightly as needed (insomnia) 12/23/16   Carolina Jalloh MD   aspirin 81 MG EC tablet Take 81 mg by mouth daily. Historical Provider, MD       Immunizations:   Most Recent Immunizations   Administered Date(s) Administered    DT (pediatric) 12/14/1998    Hepatitis B Adult (Engerix-B) 12/04/2019    Hepatitis B Adult (Recombivax HB) 12/04/2019    Influenza Virus Vaccine 10/12/2015    Influenza, Quadv, IM, PF (6 mo and older Fluzone, Flulaval, Fluarix, and 3 yrs and older Afluria) 10/09/2020    Pneumococcal Polysaccharide ([id number]) 05/23/2013    Tdap (Boostrix, Adacel) 09/12/2018       Home Blood Glucose Results:   Patient brought in glucose log. Fasting: (oldest to newest) 228,384,817,212,609,51,359,326,908,35,628,072,994,375,412,128,378,998,439,007,586,282,32,39,837,55,443,65,253,05    Before lunch (oldest to newest) 796,960,321,898,632,113,751,612,087,834,766,574,083,383,855,916,397,593,326,560,035,762,809,403,102,271,682,250,671    Before dinner (oldest to newest) 71,783,663,978,383,36/428,134,350,635,016,192,239,578,869,953,292,346,282,935,781,532,439,098,767,311,654,028,512,505    Blood glucose trends noted: see above. Patients fasting blood sugar remains controlled but blood sugars before lunch and dinner have increased somewhat with recent decrease in insulin dose. No hypoglycemic episodes. One instance of blood sugar of 71 but none since change in insulin dose. Assessment     A1c at goal:No:  7.3 (9/25/20) (decreasing)  Blood Pressure at goal: not done in office today  Weight at goal: No: decreasing  Physical activity: No  Physical activity at goal: No  Patient encouraged but unable to due SOB. Smoking Cessation: Yes    Cholesterol at target:  Yes  Annual eye exam completed: No - scheduled later this month  Comprehensive Foot Exam Completed: No - TBD  Annual urine albumin

## 2020-11-04 NOTE — PROGRESS NOTES
Please call cardiology [facility] and ask if they stopped his metoprolol  I think it is a misunderstanding    Regarding Januvia most likely the endocrinologist stopped it due to worsening chronic kidney disease stage III    Lab Results   Component Value Date     09/25/2020    K 3.8 09/25/2020     09/25/2020    CO2 26 09/25/2020    BUN 27 09/25/2020    CREATININE 1.38 09/25/2020    GLUCOSE 54 09/25/2020    CALCIUM 9.3 09/25/2020

## 2020-11-09 ENCOUNTER — APPOINTMENT (OUTPATIENT)
Dept: CT IMAGING | Age: 55
End: 2020-11-09
Payer: COMMERCIAL

## 2020-11-09 ENCOUNTER — HOSPITAL ENCOUNTER (EMERGENCY)
Age: 55
Discharge: HOME | End: 2020-11-09
Attending: EMERGENCY MEDICINE
Payer: COMMERCIAL

## 2020-11-09 VITALS
HEART RATE: 90 BPM | WEIGHT: 315 LBS | BODY MASS INDEX: 42.66 KG/M2 | HEIGHT: 72 IN | DIASTOLIC BLOOD PRESSURE: 76 MMHG | RESPIRATION RATE: 16 BRPM | TEMPERATURE: 98.8 F | OXYGEN SATURATION: 96 % | SYSTOLIC BLOOD PRESSURE: 153 MMHG

## 2020-11-09 PROCEDURE — 72125 CT NECK SPINE W/O DYE: CPT

## 2020-11-09 PROCEDURE — 6360000002 HC RX W HCPCS: Performed by: EMERGENCY MEDICINE

## 2020-11-09 PROCEDURE — 96374 THER/PROPH/DIAG INJ IV PUSH: CPT

## 2020-11-09 PROCEDURE — 99283 EMERGENCY DEPT VISIT LOW MDM: CPT

## 2020-11-09 PROCEDURE — 70450 CT HEAD/BRAIN W/O DYE: CPT

## 2020-11-09 PROCEDURE — 6370000000 HC RX 637 (ALT 250 FOR IP): Performed by: EMERGENCY MEDICINE

## 2020-11-09 RX ORDER — CYCLOBENZAPRINE HCL 10 MG
10 TABLET ORAL 3 TIMES DAILY PRN
Qty: 10 TABLET | Refills: 0 | Status: SHIPPED | OUTPATIENT
Start: 2020-11-09 | End: 2020-11-19

## 2020-11-09 RX ORDER — MORPHINE SULFATE 10 MG/ML
6 INJECTION, SOLUTION INTRAMUSCULAR; INTRAVENOUS ONCE
Status: COMPLETED | OUTPATIENT
Start: 2020-11-09 | End: 2020-11-09

## 2020-11-09 RX ORDER — CYCLOBENZAPRINE HCL 10 MG
10 TABLET ORAL ONCE
Status: COMPLETED | OUTPATIENT
Start: 2020-11-09 | End: 2020-11-09

## 2020-11-09 RX ADMIN — CYCLOBENZAPRINE 10 MG: 10 TABLET, FILM COATED ORAL at 22:18

## 2020-11-09 RX ADMIN — MORPHINE SULFATE 6 MG: 10 INJECTION, SOLUTION INTRAMUSCULAR; INTRAVENOUS at 22:21

## 2020-11-09 ASSESSMENT — PAIN SCALES - GENERAL
PAINLEVEL_OUTOF10: 8
PAINLEVEL_OUTOF10: 8

## 2020-11-09 ASSESSMENT — PAIN DESCRIPTION - LOCATION: LOCATION: BACK;ARM;NECK

## 2020-11-09 ASSESSMENT — PAIN DESCRIPTION - PAIN TYPE: TYPE: ACUTE PAIN

## 2020-11-10 ENCOUNTER — TELEPHONE (OUTPATIENT)
Dept: FAMILY MEDICINE CLINIC | Age: 55
End: 2020-11-10

## 2020-11-10 ASSESSMENT — ENCOUNTER SYMPTOMS
ABDOMINAL PAIN: 0
COUGH: 0
BACK PAIN: 0
NAUSEA: 0
VOMITING: 0

## 2020-11-10 NOTE — TELEPHONE ENCOUNTER
[facility] ([facility]) ED Follow up Call    Reason for ED visit:  Fall , Headache , Neck Pain     11/10/2020     FU appts/Provider:    Future Appointments   Date Time Provider William Hernandez   12/2/2020  8:10 AM [facility]   12/23/2020  8:00 AM MD nani Chung [facility]   2/23/2021  8:00 AM [facility]

## 2020-11-10 NOTE — ED NOTES
Mode of arrival (squad #, walk in, police, etc) : walk in         Chief complaint(s): neck pain, back pain, left arm pain, head pain, fall         Arrival Note (brief scenario, treatment PTA, etc). : pt states he went to sit down on a chair but slipped and fell. Pt is now complaining of left sided neck, back, and shoulder pain. Pt does have some midline neck tenderness. Writer and Dr. June Quiles attempted to put C collar on pt. Pt neck is not able to fit in collar. No numbness or tingling at this time. C= \"Have you ever felt that you should Cut down on your drinking? \"  No  A= \"Have people Annoyed you by criticizing your drinking? \"  No  G= \"Have you ever felt bad or Guilty about your drinking? \"  No  E= \"Have you ever had a drink as an Eye-opener first thing in the morning to steady your nerves or to help a hangover? \"  No      Deferred []      Reason for deferring: N/A    *If yes to two or more: probable alcohol abuse. Jake Moy RN  11/09/20 [id number]

## 2020-11-10 NOTE — ED PROVIDER NOTES
[address]  EMERGENCY DEPARTMENT ENCOUNTER      Pt Name: Anoop Perkins. MRN: [id number]  Birthdate 1965  Date of evaluation: 11/10/20    CHIEF COMPLAINT       Chief Complaint   Patient presents with    Fall    Headache    Neck Pain     HISTORY OF PRESENT ILLNESS   HPI 53 y.o. male presents with c/o headache and neck pain. Patient reports that he was getting onto a stool in his kitchen, he says that he slid off and fell to the side and hit side of his head and his neck. Injury happened earlier this morning around 10:00am.   Reports that throughout the day the pain worsened and so he decided to come in for evaluation. He does take Plavix, but he denies any other blood thinners. Pain is primarily on the left side of his head and in the mid and left side of his neck. Sharp and throbbing in character, constant in duration, progressively worsening. REVIEW OF SYSTEMS       Review of Systems   Constitutional: Negative for chills and fever. HENT: Negative for congestion. Respiratory: Negative for cough. Cardiovascular: Negative for chest pain. Gastrointestinal: Negative for abdominal pain, nausea and vomiting. Genitourinary: Negative for dysuria. Musculoskeletal: Positive for neck pain. Negative for back pain (Denies lower or thoracic back pain). Neurological: Positive for headaches. Hematological: Negative for adenopathy. Psychiatric/Behavioral: Negative for confusion.        PAST MEDICAL HISTORY     Past Medical History:   Diagnosis Date    Acute bronchitis with chronic obstructive pulmonary disease (COPD) ([address]     Acute on chronic kidney failure ([address] 7/20/2017    Acute on chronic respiratory failure ([address] 10/2/2018    Adhesive capsulitis of left shoulder 3/25/2017    Anxiety 10/2/2016    Arthropathy, unspecified, other specified sites 6/13/2013    Asthma     Bilateral lower leg cellulitis 2/17/2016    Blood in stool     CAD (coronary artery disease)     Cellulitis of both lower extremities 5/25/2017    Cellulitis of leg, left 07/20/2017    CHF (congestive heart failure), NYHA class III ([facility]) 8/14/2013    Chronic back pain     Chronic bronchitis ([facility])     Chronic headaches     was referred to neuro, testing scheduled    Chronic kidney disease     Chronic respiratory failure (HCC)     Chronic ulcer of left leg, with fat layer exposed ([address] 2/22/2019    Class 2 severe obesity due to excess calories with serious comorbidity and body mass index (BMI) of 35.0 to 35.9 in adult ([facility])     (BMI 35.0-39.9 without comorbidity)    COPD (chronic obstructive pulmonary disease) ([address]     COPD exacerbation ([address] 11/2/2016    Diabetic neuropathy ([address] 8/14/2013    Displacement of lumbar intervertebral disc without myelopathy 6/13/2013    Ear infection     RIGHT    Essential hypertension     Facial cellulitis 2012    Fall 3/25/2017    Former smoker     GERD (gastroesophageal reflux disease)     Head injury     Hearing loss in right ear     pencil pierced ear as a child    Hepatic steatosis 12/3/2015    History of general anesthesia complication     has woke up during surgery under anesthesia    History of rib fracture 12/3/2015    Chronic     Hyperlipidemia     Hypersomnia     Hypertension     Insomnia     Intolerance of continuous positive airway pressure (CPAP) ventilation 7/20/2017    Mastoiditis of left side     Mixed conductive and sensorineural hearing loss of both ears 1/10/2017    Per ENT    Mixed type COPD (chronic obstructive pulmonary disease) ([address]     Moderate recurrent major depression ([address] 10/2/2016    Morbid obesity with BMI of 45.0-49.9, adult ([address] 6/16/2015    Neuropathy     Open wound of groin 12/19/2018    BARBY on CPAP     BARBY treated with BiPAP     Osteoarthritis     Otitis externa of left ear     Pancreatitis chronic     Persistent depressive disorder 11/19/2019    Renal insufficiency     proteinuria    S/P cardiac cath 04/23/2018    Severe depression ([address] 9/25/2013    Spinal stenosis of lumbar region without neurogenic claudication 1/6/2016    MRI lumbar 12/30/15 L3-L4: There is broad-based bulging disc which appears protruding left laterally causing flattening of the ventral thecal sac. In addition, there is facet arthropathy with mild hypertrophic changes.  There is borderline central canal stenosis with  evidence of moderate left neural foraminal narrowing and mild right neural foramina narrowing.   L4-L5: There is broad-based protrud    Syncope 4/28/2017    Tinnitus of both ears 1/10/2017    Per ENT    Type 2 diabetes mellitus with stage 3 chronic kidney disease, with long-term current use of insulin ([address] 12/26/2016    due to underlying condition with hyperosmolarity without coma    Type II or unspecified type diabetes mellitus without mention of complication, not stated as uncontrolled     uncontrolled    Wears glasses     for reading    Wound of left leg 2/22/2019       SURGICAL HISTORY       Past Surgical History:   Procedure Laterality Date    BACK SURGERY   (x 4) 2000,.12/2011.2/2012     Dr Green Needs last 2 surg    CARDIAC CATHETERIZATION  04/23/2018    no stenting    CATARACT REMOVAL WITH IMPLANT Right 11/05/2019    [facility]    CATARACT REMOVAL WITH IMPLANT Left 01/07/2020    [facility]    COLONOSCOPY  11/3/2015    hemorrhoids, poor prep    CORONARY ANGIOPLASTY WITH STENT PLACEMENT  March 2013    x 1    HAND TENDON SURGERY Left     thumb tendon repair    INTRACAPSULAR CATARACT EXTRACTION Right 11/5/2019    EYE CATARACT EMULSIFICATION IOL IMPLANT performed by Naveen Lima MD at [address] [place] [address] Left 1/7/2020    EYE CATARACT EMULSIFICATION IOL IMPLANT performed by Naveen Lima MD at [address] [place] [address] ARTHROSCOPY Left     NERVE BLOCK  07-31-15    TENS unit    [place] ESOPHAGOGASTRODUODENOSCOPY TRANSORAL DIAGNOSTIC N/A 7/18/2018    EGD ESOPHAGOGASTRODUODENOSCOPY performed by Marialuisa Rai MD at [address] Bilateral 09/20/2012    Dr Jaziel Uribe  4/13/2013    normal       CURRENT MEDICATIONS       Discharge Medication List as of 11/9/2020 10:53 PM      CONTINUE these medications which have NOT CHANGED    Details   metoprolol tartrate (LOPRESSOR) 50 MG tablet Take 1 tablet by mouth 2 times daily, Disp-180 tablet,R-3Normal      magnesium oxide (MAG-OX) 400 (240 Mg) MG tablet Take 1 tablet by mouth daily, Disp-90 tablet,R-3Normal      lisinopril (PRINIVIL;ZESTRIL) 5 MG tablet Take 1 tablet by mouth daily . Discontinued Lisinopril  10 mg, Disp-90 tablet,R-3Normal      oxyCODONE HCl (OXY-IR) 10 MG immediate release tablet Take 1 tablet by mouth every 8 hours as needed for Pain for up to 30 days. , Disp-90 tablet,R-0Normal      fluticasone-salmeterol (ADVAIR HFA) 230-21 MCG/ACT inhaler Inhale 2 puffs into the lungs 2 times daily, Disp-12 g,R-11Normal      tiotropium (SPIRIVA RESPIMAT) 2.5 MCG/ACT AERS inhaler Inhale 2 puffs into the lungs daily, Disp-12 g,R-11Normal      venlafaxine (EFFEXOR XR) 75 MG extended release capsule Take 1 capsule by mouth daily Take with food, Disp-90 capsule,R-3Normal      naloxone 4 MG/0.1ML LIQD nasal spray 1 spray by Nasal route as needed for Opioid Reversal Due to COPD and sleep apnea, this is a big recommendation for you, Disp-1 each,R-5Normal      varenicline (CHANTIX CONTINUING MONTH GUALBERTO) 1 MG tablet Take 1 tablet by mouth 2 times daily, Disp-180 tablet,R-0Phone In      blood glucose monitor strips Test 3 times a day & as needed for symptoms of irregular blood glucose. Dispense sufficient amount for indicated testing frequency plus additional to accommodate PRN testing needs. One touch Ultra blue, Disp-300 strip,R-0, Phone In      !! Lancets MISC Disp-300 each,R-2, Phone InUse to check blood sugar three times daily along with when necessary due to symptoms. Take 81 mg by mouth daily. Historical Med       !! - Potential duplicate medications found. Please discuss with provider. ALLERGIES     is allergic to levofloxacin; lorazepam; nsaids; prozac [fluoxetine hcl]; and vancomycin. FAMILY HISTORY     He indicated that the status of his mother is unknown. He indicated that the status of his father is unknown. He indicated that the status of his sister is unknown. SOCIAL HISTORY      reports that he quit smoking 9 days ago. His smoking use included cigarettes. He started smoking about 35 years ago. He has a 8.25 pack-year smoking history. He quit smokeless tobacco use about 25 years ago. His smokeless tobacco use included snuff. He reports current drug use. Drug: Marijuana. He reports that he does not drink alcohol. PHYSICAL EXAM     INITIAL VITALS: BP (!) 153/76   Pulse 90   Temp 98.8 °F (37.1 °C) (Oral)   Resp 16   Ht 6' (1.829 m)   Wt (!) 401 lb (181.9 kg)   SpO2 96%   BMI 54.39 kg/m²     GEN: NAD  Head: NCAT  HEENT: PERRL. EOMI, No conjunctival hemorrhage. No pupil deformity. Negative patrick sign, negative csf rhinorrhea. Negative raccoon eyes. No loose teeth. Neck: No subq emphysema. Cervical spine with left-sided paravertebral tenderness to palpation. Chest: Ribs without TTP. No bruising, lacerations, or abrasions. CVS: RRR, no murmurs, no rubs, no muffled heart sounds. Bilateral radial, dp, and pt pulses are 2+. Pulm: CTA b/l. Normal breath sounds over all lung fields. Abd: soft, non-tender to palpation, no ecchymosis, or erythema, no guarding or rebound  Skin: No laceration   no abrasion  MSK: No thoracic or lumbar tenderness to palpation  Neurologic: Patient is alert and oriented x3, motor and sensation is intact in all 4 extremities, speech is fluent  Extremities: Capillary refill appropriate       MEDICAL DECISION MAKING:     MDM  53 y.o. male presenting with neck pain and headache after fall off a chair.   The patient is on PlavixWe'll obtain a ct scan of the head and neck to r/o any intracranial bleeding or fracture to the cervical spine, because of his pain and traumatic injury. Providing analgesia and will reassess. Emergency Department course:  CT scan show no acute traumatic injury. The patient is reassessed and he states that he is feeling much better. The patient has tizanidine and oxycodone at home for analgesia. D/w pt the results, treatment plan, warning precautions for prompt ED return and importance of close OP FU, he verbalizes understanding and agrees with the treatment plan. DIAGNOSTIC RESULTS     RADIOLOGY:All plain film, CT, MRI, and formal ultrasound images (except ED bedside ultrasound) are read by the radiologist and the images and interpretations are directly viewed by the emergency physician. CT CERVICAL SPINE WO CONTRAST   Final Result   No acute abnormality of the cervical spine. CT HEAD WO CONTRAST   Final Result   No acute intracranial abnormality. Bilateral mastoid air cell effusions. Paranasal sinus disease. EMERGENCY DEPARTMENT COURSE:   Vitals:    Vitals:    11/09/20 2200 11/09/20 2215 11/09/20 2230 11/09/20 2245   BP:       Pulse:       Resp:       Temp:       TempSrc:       SpO2: 97% 97% 97% 96%   Weight:       Height:           The patient was given the following medications while in the emergency department:  Orders Placed This Encounter   Medications    morphine (PF) injection 6 mg    cyclobenzaprine (FLEXERIL) tablet 10 mg    cyclobenzaprine (FLEXERIL) 10 MG tablet     Sig: Take 1 tablet by mouth 3 times daily as needed for Muscle spasms     Dispense:  10 tablet     Refill:  0     -------------------------  CRITICAL CARE:   CONSULTS: None  PROCEDURES: Procedures     FINAL IMPRESSION      1. Acute strain of neck muscle, initial encounter    2.  Injury of head, initial encounter          DISPOSITION/PLAN   DISPOSITION Decision To Discharge 11/09/2020 10:52:50 PM      PATIENT REFERRED TO:  Killian Gutierres MD  [address]  [address] [facility] AT THE [facility]    In 3 days      [facility] ED  [facility] [address]  [address] [facility] [address]  [phone]    If symptoms worsen      DISCHARGE MEDICATIONS:  Discharge Medication List as of 11/9/2020 10:53 PM      START taking these medications    Details   cyclobenzaprine (FLEXERIL) 10 MG tablet Take 1 tablet by mouth 3 times daily as needed for Muscle spasms, Disp-10 tablet,R-0Print           Maryanne Dodd MD  Attending Emergency Physician       Maryanne Dodd MD  11/10/20 2171

## 2020-11-10 NOTE — ED NOTES
Pt discharged in stable condition with prescriptions and dc instructions. Pt ambulates to door with steady gait and without assistance.         Jean-Pierre Toscano RN  11/09/20 [id number]

## 2020-11-16 ENCOUNTER — PATIENT MESSAGE (OUTPATIENT)
Dept: FAMILY MEDICINE CLINIC | Age: 55
End: 2020-11-16

## 2020-11-16 NOTE — TELEPHONE ENCOUNTER
From: Jerry Cole. To: Karina Blanco MD  Sent: 11/16/2020 3:43 PM EST  Subject: neck pain    Yes I do I might miss a dose here and there but not too much only if I have to drive some where about two. Or three times a month      ----- Message -----   From:Paige William MD   Sent:11/16/2020 3:34 PM EST   To:Miguel Viveros Subject:RE: neck pain    Miguel,  Your next fill is due next week , if you want to do the testing this week , I can order it now so when it is due for refill there will not be any delays hopefully  You do take your oxycodone religiously right? And you do smoke a marijuana right? I just have to put it on the form as drugs to be expected    10/29/2020  1  10/28/2020  Oxycodone Hcl 10 MG Tablet  90.00  [address] [facility]  [id number]     Future Appointments  12/2/2020 8:10 AM [facility] MEDICATION MGMT [facility] MED [facility]  12/23/2020 8:00 AM Karina Blanco MD fp sc [facility]  2/23/2021 8:00 AM Karina Blanco MD [facility]    If you have any questions, please let me know. Karina Blanco MD  [address] [address]  Dept: [phone]  Dept Fax: [phone]      ----- Message -----   From:Miguel Wadsworth. Sent:11/16/2020 3:01 PM EST   To:Paige Bryant MD   Subject:RE: neck pain      Ok just let me know when you want me to do it and I will I don't mean to be a pain in the back side but that's me have a good day      ----- Message -----   From:Paige William MD   Sent:11/16/2020 2:00 PM EST   To:Miguel Viveros Subject:RE: neck pain    To see if you are taking your pain medication  I know you smoke marijuana, and cocaineyou did tell me before  We have to do it for everyone on pain meds or other controlled substances        ----- Message -----   From:Miguel Clinton.    Sent:11/16/2020 1:30 PM EST   To:Paige Bryant MD   Subject:RE: neck pain    Drug Screen for what reason can I

## 2020-11-16 NOTE — TELEPHONE ENCOUNTER
From: Radha Perez.   To: Indira Jones MD  Sent: 11/16/2020 12:13 PM EST  Subject: Non-Urgent Medical Question    Sorry to bother you but I still can't turn my head my neck is so sore it has been one week and there's no change in the pain can you please look at the test I had done at the ER I don't want to see anybody else but you I trust you let me know please

## 2020-11-16 NOTE — TELEPHONE ENCOUNTER
Lab Results   Component Value Date    WBC 9.6 09/25/2020    HGB 12.7 (L) 09/25/2020    HCT 38.7 (L) 09/25/2020    MCV 84.1 09/25/2020     09/25/2020       Lab Results   Component Value Date     09/25/2020    K 3.8 09/25/2020     09/25/2020    CO2 26 09/25/2020    BUN 27 09/25/2020    CREATININE 1.38 09/25/2020    GLUCOSE 54 09/25/2020    CALCIUM 9.3 09/25/2020        Lab Results   Component Value Date    ALT 16 09/25/2020    AST 15 09/25/2020    ALKPHOS 64 09/25/2020    BILITOT 0.42 09/25/2020       Lab Results   Component Value Date    TSH 2.04 07/13/2020       Lab Results   Component Value Date    CHOL 151 09/25/2020    CHOL 139 09/12/2019    CHOL 161 09/20/2018     Lab Results   Component Value Date    TRIG 180 (H) 09/25/2020    TRIG 247 (H) 09/12/2019    TRIG 312 (H) 09/20/2018     Lab Results   Component Value Date    HDL 33 (L) 09/25/2020    HDL 35 (L) 03/03/2020    HDL 29 (L) 09/12/2019     Lab Results   Component Value Date    LDLCHOLESTEROL 82 09/25/2020    LDLCHOLESTEROL 75 03/03/2020    LDLCHOLESTEROL 61 09/12/2019       Lab Results   Component Value Date    CHOLHDLRATIO 4.6 09/25/2020    CHOLHDLRATIO 4.4 03/03/2020    CHOLHDLRATIO 4.8 09/12/2019       Lab Results   Component Value Date    LABA1C 7.3 (H) 09/25/2020       Lab Results   Component Value Date    UJUTNDUO39 651 09/25/2020       Lab Results   Component Value Date    FOLATE 13.3 09/25/2020       Lab Results   Component Value Date    IRON 50 (L) 11/26/2019    TIBC 352 11/26/2019    FERRITIN 100 12/26/2016       Lab Results   Component Value Date    VITD25 42.5 07/13/2020

## 2020-11-16 NOTE — TELEPHONE ENCOUNTER
From: Oli Raya. To: Hallie Barahona MD  Sent: 11/16/2020 3:49 PM EST  Subject: neck pain      That will work for me you are the Floydene Rather      ----- Message -----   Cecelia Alvarenga MD   Sent:11/16/2020 3:48 PM EST   To:Miguel Diaz. Subject:RE: neck pain    How about this Friday, does it work for you? I need to put a date on the order, that is why I'm asking      ----- Message -----   From:Miguel Diaz. Sent:11/16/2020 3:43 PM EST   To:Paige Bryant MD   Subject:neck pain    Yes I do I might miss a dose here and there but not too much only if I have to drive some where about two. Or three times a month      ----- Message -----   From:Paige Lucas MD   Sent:11/16/2020 3:34 PM EST   To:Miguel Diaz. Subject:RE: neck pain    Miguel,  Your next fill is due next week , if you want to do the testing this week , I can order it now so when it is due for refill there will not be any delays hopefully  You do take your oxycodone religiously right? And you do smoke a marijuana right? I just have to put it on the form as drugs to be expected    10/29/2020  1  10/28/2020  Oxycodone Hcl 10 MG Tablet  90.00  30 [address] [facility]  [id number]     Future Appointments  12/2/2020 8:10 AM  MEDICATION MGMT [facility] MED [facility]  12/23/2020 8:00 AM MD nani Mims sc [facility]  2/23/2021 8:00 AM Hallie Barahona MD fp sc [facility]    If you have any questions, please let me know. Hallie Barahona MD  [address] [facility] [address]  [address] [address]  Dept: [phone]  Dept Fax: [phone]      ----- Message -----   From:Miguel Schmitz.    Sent:11/16/2020 3:01 PM EST   To:Paige Bryant MD   Subject:RE: neck pain      Ok just let me know when you want me to do it and I will I don't mean to be a pain in the back side but that's me have a good day      ----- Message -----   From:Paige Bryant MD   Sent:11/16/2020 2:00 PM EST   To:Miguel Paige. Subject:RE: neck pain    To see if you are taking your pain medication  I know you smoke marijuana, and cocaineyou did tell me before  We have to do it for everyone on pain meds or other controlled substances        ----- Message -----   From:Miguel Paige. Sent:11/16/2020 1:30 PM EST   To:Paige Bryant MD   Subject:RE: neck pain    Drug Screen for what reason can I ask      ----- Message -----   From:Paige Leung MD   Sent:11/16/2020 12:30 PM EST   To:Miguel Paige. Subject:neck pain    Miguel,    It looks like could be a muscle spasm, straightening of the spine, on top of degenerative disc disease. I think you should use heating pad, Bengay ultra strenght. Nothing was fractured which is good    Just letting you know in advance that for next refill of pain medication I'll need to send you for a drug screen at the lab and I could also add the labs you might need at that time. FINDINGS:   BONES/ALIGNMENT: There is no acute fracture or traumatic malalignment. Some   straightening with slight reversal, centered C4; mild convex-left curvature. Intracanalicular assessment limited below C3 due to body habitus. DEGENERATIVE CHANGES: Mild multilevel spondylosis, mild disc space narrowing   and at least moderate facet arthrosis, more severe on the left C2-C4. If you have any questions, please let me know. Anders Waddell MD  [address] [facility] [address]  [address] [address]  Dept: [phone]  Dept Fax: [phone]      ----- Message -----   From:Miguel Dejesus.    Sent:11/16/2020 12:13 PM EST   To:Paige Bryant MD   Subject:Non-Urgent Medical Question    Sorry to bother you but I still can't turn my head my neck is so sore it has been one week and there's no change in the pain can you please look at the test I had done at the ER I don't want to see anybody else but you I trust you let me know please

## 2020-11-20 ENCOUNTER — PATIENT MESSAGE (OUTPATIENT)
Dept: FAMILY MEDICINE CLINIC | Age: 55
End: 2020-11-20

## 2020-11-20 ENCOUNTER — HOSPITAL ENCOUNTER (OUTPATIENT)
Age: 55
Discharge: HOME | End: 2020-11-20
Payer: COMMERCIAL

## 2020-11-20 LAB
AMPHETAMINE SCREEN URINE: NEGATIVE
ANION GAP SERPL CALCULATED.3IONS-SCNC: 19 MMOL/L (ref 9–17)
BARBITURATE SCREEN URINE: NEGATIVE
BENZODIAZEPINE SCREEN, URINE: NEGATIVE
BNP INTERPRETATION: NORMAL
BUN BLDV-MCNC: 36 MG/DL (ref 6–20)
BUN/CREAT BLD: ABNORMAL (ref 9–20)
BUPRENORPHINE URINE: ABNORMAL
CALCIUM SERPL-MCNC: 10 MG/DL (ref 8.6–10.4)
CANNABINOID SCREEN URINE: POSITIVE
CHLORIDE BLD-SCNC: 95 MMOL/L (ref 98–107)
CO2: 22 MMOL/L (ref 20–31)
COCAINE METABOLITE, URINE: NEGATIVE
CREAT SERPL-MCNC: 1.77 MG/DL (ref 0.7–1.2)
GFR AFRICAN AMERICAN: 49 ML/MIN
GFR NON-AFRICAN AMERICAN: 40 ML/MIN
GFR SERPL CREATININE-BSD FRML MDRD: ABNORMAL ML/MIN/{1.73_M2}
GFR SERPL CREATININE-BSD FRML MDRD: ABNORMAL ML/MIN/{1.73_M2}
GLUCOSE BLD-MCNC: 263 MG/DL (ref 70–99)
HCT VFR BLD CALC: 41.9 % (ref 41–53)
HEMOGLOBIN: 13.4 G/DL (ref 13.5–17.5)
MAGNESIUM: 2 MG/DL (ref 1.6–2.6)
MCH RBC QN AUTO: 27 PG (ref 26–34)
MCHC RBC AUTO-ENTMCNC: 32 G/DL (ref 31–37)
MCV RBC AUTO: 84.3 FL (ref 80–100)
MDMA URINE: ABNORMAL
METHADONE SCREEN, URINE: NEGATIVE
METHAMPHETAMINE, URINE: ABNORMAL
NRBC AUTOMATED: ABNORMAL PER 100 WBC
OPIATES, URINE: NEGATIVE
OXYCODONE SCREEN URINE: POSITIVE
PDW BLD-RTO: 16.5 % (ref 11.5–14.9)
PHENCYCLIDINE, URINE: NEGATIVE
PHOSPHORUS: 4.1 MG/DL (ref 2.5–4.5)
PLATELET # BLD: 283 K/UL (ref 150–450)
PMV BLD AUTO: 8.5 FL (ref 6–12)
POTASSIUM SERPL-SCNC: 4.7 MMOL/L (ref 3.7–5.3)
PRO-BNP: 86 PG/ML
PROPOXYPHENE, URINE: ABNORMAL
RBC # BLD: 4.96 M/UL (ref 4.5–5.9)
SODIUM BLD-SCNC: 136 MMOL/L (ref 135–144)
TEST INFORMATION: ABNORMAL
TRICYCLIC ANTIDEPRESSANTS, UR: ABNORMAL
WBC # BLD: 16.1 K/UL (ref 3.5–11)

## 2020-11-20 PROCEDURE — 83735 ASSAY OF MAGNESIUM: CPT

## 2020-11-20 PROCEDURE — 85027 COMPLETE CBC AUTOMATED: CPT

## 2020-11-20 PROCEDURE — 84100 ASSAY OF PHOSPHORUS: CPT

## 2020-11-20 PROCEDURE — 80307 DRUG TEST PRSMV CHEM ANLYZR: CPT

## 2020-11-20 PROCEDURE — 80048 BASIC METABOLIC PNL TOTAL CA: CPT

## 2020-11-20 PROCEDURE — 83880 ASSAY OF NATRIURETIC PEPTIDE: CPT

## 2020-11-20 PROCEDURE — 36415 COLL VENOUS BLD VENIPUNCTURE: CPT

## 2020-11-20 RX ORDER — DOXYCYCLINE HYCLATE 100 MG
100 TABLET ORAL 2 TIMES DAILY
Qty: 20 TABLET | Refills: 0 | Status: SHIPPED | OUTPATIENT
Start: 2020-11-20 | End: 2020-11-30

## 2020-11-20 RX ORDER — CHLORHEXIDINE GLUCONATE 4 G/100ML
SOLUTION TOPICAL
Qty: 946 ML | Refills: 2 | Status: SHIPPED | OUTPATIENT
Start: 2020-11-20 | End: 2021-01-20 | Stop reason: SDUPTHER

## 2020-11-20 NOTE — RESULT ENCOUNTER NOTE
Mychart comment sent to patient. urine drug screen consistent with treatment, positive for Oxycodone, using THC per report   Blood Glucose high  Chronic kidney disease stage 3 worsening . Drink water 6 x 8 oz glasses of water daily  WBC count is high, could be related to smoking, dehydration, stress or INFECTION?     Future Appointments  12/2/2020  8:10 AM    [facility] MEDICATION MGMT       [facility] MED MGMT   [facility]  12/23/2020 8:00 AM    Kyung Contreras MD     [facility]  2/23/2021  8:00 AM    Kyung Contreras MD     [facility]

## 2020-11-20 NOTE — TELEPHONE ENCOUNTER
From: Melissa Wilson MD  To: Sushil Davis. Sent: 11/20/2020 4:06 PM EST  Subject: labs    Miguel,    I released your labs      urine drug screen consistent with treatment, positive for Oxycodone, using THC per report   Blood Glucose high  Chronic kidney disease stage 3 worsening . Drink water 6 x 8 oz glasses of water daily  WBC count, which is white blood cell count , is high, could be related to smoking, dehydration, stress or INFECTION? Do you have any signs of infection? Any cellulitis? If you have any questions, please let me know.     Melissa Wilson MD  [address] [facility]  [address] [address]  Dept: [phone]  Dept Fax: [phone]

## 2020-11-24 ENCOUNTER — PATIENT MESSAGE (OUTPATIENT)
Dept: FAMILY MEDICINE CLINIC | Age: 55
End: 2020-11-24

## 2020-11-24 RX ORDER — OXYCODONE HYDROCHLORIDE 10 MG/1
10 TABLET ORAL EVERY 8 HOURS PRN
Qty: 90 TABLET | Refills: 0 | Status: SHIPPED | OUTPATIENT
Start: 2020-11-24 | End: 2020-12-21 | Stop reason: SDUPTHER

## 2020-11-24 NOTE — TELEPHONE ENCOUNTER
From: Ger Kirk.   To: Tatiana Rocha MD  Sent: 11/24/2020 9:33 AM EST  Subject: Non-Urgent Medical Question    Can you please Refill my Oxycodone 10 mg One every 8 hours I will be out of them Friday thank you and have a great Thanksgiving

## 2020-11-25 RX ORDER — PNV NO.95/FERROUS FUM/FOLIC AC 28MG-0.8MG
1 TABLET ORAL DAILY
Qty: 90 TABLET | Refills: 3 | Status: SHIPPED | OUTPATIENT
Start: 2020-11-25 | End: 2021-12-01

## 2020-11-25 NOTE — TELEPHONE ENCOUNTER
From: Jarvis Akhtar   To: Lennox Oregon, MD  Sent: 11/24/2020 8:21 PM EST  Subject: Non-Urgent Medical Question    Can you please Refill my FERROUS SULFATE 325MG TABLET'S THANKS again

## 2020-12-02 ENCOUNTER — HOSPITAL ENCOUNTER (OUTPATIENT)
Dept: PHARMACY | Age: 55
Setting detail: THERAPIES SERIES
Discharge: HOME | End: 2020-12-02
Payer: COMMERCIAL

## 2020-12-02 PROCEDURE — 99213 OFFICE O/P EST LOW 20 MIN: CPT

## 2020-12-02 RX ORDER — VARENICLINE TARTRATE 1 MG/1
1 TABLET, FILM COATED ORAL 2 TIMES DAILY
Qty: 60 TABLET | Refills: 0 | Status: SHIPPED | OUTPATIENT
Start: 2020-12-02 | End: 2021-01-20 | Stop reason: SDUPTHER

## 2020-12-02 NOTE — PROGRESS NOTES
Diabetic Medication Management   [address] [facility] [address]. [place], [address] [facility]  Phone: [phone]  Fax: [phone]    NAME: Prateek Tate. MEDICAL RECORD NUMBER:  [id number]  AGE: 53 y.o. GENDER: male  : 1965  EPISODE DATE:  2020       Mr. Isabella Garcia was referred to [facility] by Dr. Phyllis Escalona, Special instructions include: titrate all medications (as defined in clinic's policy and procedure)    Visit completed via phone due to Covid precautions. Goals per referral:   Fasting blood glucose: < 130  Peak postprandial glucose: < 180  A1C: < 7    Other goals:  Blood pressure goal: 130/80  Weight loss goal (~10%): Target weight  410 to be reached by date: Dec 2020 (3-6 months)  Physical Activity goal:    10 minutes three times per week to be reached by date: Dec 2020 (3-6 months)  Smoking Cessation   Quit Date: Stopped 20. Currently using Chantix  Cholesterol at target:   Date: Yes LDL 82  HDL 33 Trig 180 (20)  Annual eye exam:    Date: Cancelled due to illness - will call to reschedule  Comprehensive annual foot exam:   Date: No due to covid  Annual urine Albumin and serum creatinine:   Date: Needs updated microalbumin 15 mg/L (2018), SrCr 1.38 mg/dL (2020)        Subjective   Mr. Isabella Garcia is a 53 y.o. male here for the Diabetes Service for self-management education, medication review including over the counter medications and herbal products, overall well-being assessment, transition of care and any needed adjustments with updates and recommendations communicated to the referring physician. Patient's name and  verified. Patient Findings:   Patient had fall on . Hit head/neck on kitchen counter. Patient has severe headache this am.  Next appt with PCP . Encouraged him to call PCP to discuss. Patient does say the ER wanted to put a neck brace on patient but did not have one large enough. Encouraged patient to ask PCP. Patient does say mobility of neck has improved somewhat. Patient states not taking any more pain meds then normal even with headache. Lies in bed in dark which somewhat helps. Pain in groin/lower abdomen has gotten a bit better but still constant. Has no further follow up as patient states he was told not surgical candidate and nothing else to do. Patient has not smoked in about a month. Congratulated patient and encouraged continued cessation. Discussed continuing Chantix 12 week past smoking cessation to help patient stay smoke free. Chantix refill [facility] to [facility] for patient to continue 4 more weeks past 2 week supply he has at home. Patient has been on doxycycline for infection in arm pit. Finished doxycycline a few days ago. Has improved but still has it. This has been chronic issue for patient. Encouraged patient to continue to inform provider if worsens to avoid larger issue. Patient states anxiety/depression is worse and expects it to get even worse with holidays. Patient stays in his room. Waiting till after the holiday to look for another possible place to live. Patient not skipping meals but eating dinner late 9-10pm    Patient has been taking insulin dose as instructed at 0.52ml, 0.5ml, 0.45ml (breakfast, lunch and dinner). Patient has not had any further hypoglycemic episodes. Lowest blood sugar since change was 81 which was on last appt day 11/4. Patient needs eye appt as had to cancel due to illness. Encouraged patient to call and schedule another appt.        []  Missed doses   [x]  Emergency Room Visit    [x]  Medication changes  []  Hospitalization   []  Diet changes   [x]  Acute illness   []  Activity changes      Symptoms of hypoglycemia    [x]  None    []  Shakiness    []  Lightheadedness or dizziness   []  Confusion      []  Sweating   [x]  Other -sluggish       Symptoms of hyperglycemia -.    [x]  None   [] Frequent urination    []  Increased thirst   []  Other    Medication adverse reactions (none due to diabetic medicaitons)   [x]  None   []  Diarrhea / Nausea / Vomiting / Constipation / flatulence   []  Hypertension   []  Peripheral edema     []  Signs of infection   []  Headache   []  Vision changes   []  Increased cholesterol    []  Weight gain   []  Change in renal function   []  Increased potassium  []  Other      Comments:  Patient takes Humulin R U500. Patient has been taking Humulin R U500 0.52ml with breakfast, 0.5ml with lunch and 0.45 with dinner.      If recent hospital admission / ED visit, was this related to Diabetes No:Fall Discharge date 11/9/20    Objective     PMHx:    Past Medical History:   Diagnosis Date    Acute bronchitis with chronic obstructive pulmonary disease (COPD) ([address]     Acute on chronic kidney failure ([facility] 7/20/2017    Acute on chronic respiratory failure (HCC) 10/2/2018    Adhesive capsulitis of left shoulder 3/25/2017    Anxiety 10/2/2016    Arthropathy, unspecified, other specified sites 6/13/2013    Asthma     Bilateral lower leg cellulitis 2/17/2016    Blood in stool     CAD (coronary artery disease)     Cellulitis of both lower extremities 5/25/2017    Cellulitis of leg, left 07/20/2017    CHF (congestive heart failure), NYHA class III ([facility] 8/14/2013    Chronic back pain     Chronic bronchitis ([facility])     Chronic headaches     was referred to neuro, testing scheduled    Chronic kidney disease     Chronic respiratory failure ([facility])     Chronic ulcer of left leg, with fat layer exposed ([address] 2/22/2019    Class 2 severe obesity due to excess calories with serious comorbidity and body mass index (BMI) of 35.0 to 35.9 in adult ([facility])     (BMI 35.0-39.9 without comorbidity)    COPD (chronic obstructive pulmonary disease) ([address]     COPD exacerbation ([address] 11/2/2016    Diabetic neuropathy ([address] 8/14/2013    Displacement of lumbar intervertebral disc without topically 2 times daily on the affected area for 7-10 days. OK to substitute to cream 11/20/20   Terri Olivia MD   metoprolol tartrate (LOPRESSOR) 50 MG tablet Take 1 tablet by mouth 2 times daily 11/4/20   Terri Olivia MD   magnesium oxide (MAG-OX) 400 (240 Mg) MG tablet Take 1 tablet by mouth daily 11/4/20   Terri Olivia MD   lisinopril (PRINIVIL;ZESTRIL) 5 MG tablet Take 1 tablet by mouth daily . Discontinued Lisinopril  10 mg 11/2/20   Terri Olivia MD   fluticasone-salmeterol (ADVAIR HFA) 230-21 MCG/ACT inhaler Inhale 2 puffs into the lungs 2 times daily 10/19/20   Terri Olivia MD   tiotropium (SPIRIVA RESPIMAT) 2.5 MCG/ACT AERS inhaler Inhale 2 puffs into the lungs daily 10/19/20   Terri Olivia MD   venlafaxine (EFFEXOR XR) 75 MG extended release capsule Take 1 capsule by mouth daily Take with food 10/14/20   Terri Olivia MD   naloxone 4 MG/0.1ML LIQD nasal spray 1 spray by Nasal route as needed for Opioid Reversal Due to COPD and sleep apnea, this is a big recommendation for you 9/30/20   Terri Olivia MD   varenicline (CHANTIX CONTINUING MONTH GUALBERTO) 1 MG tablet Take 1 tablet by mouth 2 times daily 9/30/20   Ellie Mcdonald MD   blood glucose monitor strips Test 3 times a day & as needed for symptoms of irregular blood glucose. Dispense sufficient amount for indicated testing frequency plus additional to accommodate PRN testing needs. One touch Ultra blue 9/30/20   Ellie Mcdonald MD   Lancets MISC Use to check blood sugar three times daily along with when necessary due to symptoms. 9/30/20   Ellie Mcdonald MD   Insulin Syringe-Needle U-100 31G X 5/16\" 1 ML MISC Use to subcutaneously inject insulin three times daily 9/30/20   Ellie Mcdonald MD   neomycin-polymyxin-hydrocortisone (CORTISPORIN) 3.5-86300-8 otic suspension Place 3 drops into both ears 4 times daily OK to substitute.  For 10 days 9/16/20   Terri Olivia MD   ketoconazole (NIZORAL) 2 % cream Apply topically  2 times a day x 4-6 weeks, right palm 8/31/20   Yang Maldonado MD   vitamin D3 (CHOLECALCIFEROL) 25 MCG (1000 UT) TABS tablet Take 1 tablet by mouth daily 8/26/20   Yang Maldonado MD   clopidogrel (PLAVIX) 75 MG tablet TAKE ONE TABLET BY MOUTH DAILY 7/29/20   Yang Maldonado MD   vitamin B-12 (CYANOCOBALAMIN) 500 MCG tablet Take 1 tablet by mouth daily 7/13/20   Yang Maldonado MD   tiZANidine (ZANAFLEX) 4 MG tablet TAKE ONE TABLET BY MOUTH EVERY 8 HOURS AS NEEDED FOR BACK PAIN 7/8/20   Yang Maldonado MD   insulin regular human (HUMULIN R U-500) 500 UNIT/ML concentrated injection vial Inject 0.5 ml with breakfast, 0.55 ml with lunch and 0.45 ml with dinner. Patient taking differently: Inject 0.52 ml with breakfast, 0.50 ml with lunch and 0.45 ml with dinner. 6/25/20   Yang Maldonado MD   escitalopram (LEXAPRO) 10 MG tablet Take 1 tablet by mouth daily 6/17/20   Yang Maldonado MD   pravastatin (PRAVACHOL) 40 MG tablet Take 1 tablet by mouth nightly 5/20/20   Yang Maldonado MD   albuterol (PROVENTIL) (2.5 MG/3ML) 0.083% nebulizer solution Take 3 mLs by nebulization every 6 hours as needed for Wheezing or Shortness of Breath 4/15/20   Yang Maldonado MD   Oxygen Tubing MISC by Does not apply route DX COPD.  chronic respiratory failure 3/26/20   Yang Maldonado MD   spironolactone (ALDACTONE) 50 MG tablet Take 1 tablet by mouth daily 3/11/20   Yang Maldonado MD   Respiratory Therapy Supplies (NEBULIZER/TUBING/MOUTHPIECE) KIT Dx COPD needs nebulizer supplies 2/20/20   Yang Maldonado MD   nitroGLYCERIN (NITROSTAT) 0.4 MG SL tablet Place 1 tablet under the tongue every 5 minutes as needed for Chest pain (and call 911) 2/13/20   Yang Maldonado MD   pantoprazole (PROTONIX) 40 MG tablet Take 1 tablet by mouth nightly 1/15/20   Yang Maldonado MD   docusate sodium (STOOL SOFTENER) 100 MG capsule Take 1 capsule by mouth 2 times daily 1/15/20 Walker Tuttle MD   ONE TOUCH ULTRASOFT LANCETS [address] [facility] Patient to test blood sugar up to 4 times daily. 11/7/19   Ryann Banks MD   bumetanide (BUMEX) 1 MG tablet Take 3 tablets by mouth 2 times daily  Patient taking differently: Take 3 mg by mouth daily  10/10/19   Walker Tuttle MD   nystatin (MYCOSTATIN) 929332 UNIT/GM powder Apply 2-3 times daily in skin folds. 6/19/19   Walker Tuttle MD   Lancet Devices (LANCING DEVICE) MISC Provide patient with lancing device appropriate for his machine/lancing needles. 6/4/19   Walker Tuttle MD   Lidocaine 4 % LOTN Apply topically    Historical Provider, MD   Spacer/Aero Chamber Mouthpiece MISC 1 each by Does not apply route once as needed (to be used with his inhalers) 4/15/19 6/10/20  Walker Tuttle MD   metolazone (ZAROXOLYN) 2.5 MG tablet Take 2.5 mg by mouth three times a week MWF    Historical Provider, MD   PROAIR  (90 Base) MCG/ACT inhaler INHALE TWO PUFFS BY MOUTH EVERY 6 HOURS AS NEEDED FOR WHEEZING OR SHORTNESS OF BREATH 3/27/19   Rose Scales MD   Handicap Placard MISC by Does not apply route Can't walk greater than 200 feet. Expires in 5 years. 2/13/19   Paige Bryant MD   miconazole (MICOTIN) 2 % powder Apply topically 2 times daily. 12/20/18   Bel Trinidad MD   fluticasone (CUTIVATE) 0.05 % cream Apply topically 2 times daily  4/19/17   Historical Provider, MD   fluticasone (FLONASE) 50 MCG/ACT nasal spray 2 sprays by Nasal route daily  Patient taking differently: 2 sprays by Nasal route daily as needed (sinus symptoms)  1/16/17   Walker Tuttle MD   Melatonin 10 MG TABS Take 10 mg by mouth nightly as needed (insomnia) 12/23/16   Walker Tuttle MD   aspirin 81 MG EC tablet Take 81 mg by mouth daily.     Historical Provider, MD       Immunizations:   Most Recent Immunizations   Administered Date(s) Administered    DT (pediatric) 12/14/1998    Hepatitis B Adult (Engerix-B) 12/04/2019    Hepatitis B Adult (Recombivax HB) 12/04/2019    Influenza Virus Vaccine 10/12/2015    Influenza, Quadv, IM, PF (6 mo and older Fluzone, Flulaval, Fluarix, and 3 yrs and older Afluria) 10/09/2020    Pneumococcal Polysaccharide (Onycvimdj91) 05/23/2013    Tdap (Boostrix, Adacel) 09/12/2018       Home Blood Glucose Results:   Patient brought in glucose log. Fasting: (newest to oldest)   100, 98, 100,104, 100,99, 96 ,101, 99, 97, 95, 98,101,99,91,97    Before lunch (newest to oldest)  130,129,135,139,139,136,138,141,140,132,127,130,132,129    Before dinner (newest to oldest)  149,150,156,159,147,149,144,157,154,152,149,158,156,152,149,160    Lowest since November 1st was 81 on November 4th    Blood glucose trends noted: see above. Patients fasting blood sugar remains controlled and blood sugars before lunch and dinner have improved. Would like to see blood sugars before dinner a little lower. Assessment     A1c at goal:No:  7.3 (9/25/20) (decreasing)  Blood Pressure at goal: not done in office today  Weight at goal: No: decreasing  Physical activity: No  Physical activity at goal: No  Patient encouraged but unable to due SOB. Smoking Cessation: Yes    Cholesterol at target: Yes  Annual eye exam completed: No - scheduled later this month  Comprehensive Foot Exam Completed: No - TBD  Annual urine albumin and serum creatinine: Yes for SrCr but needs microalbumin. Patient given urine collection cup to bring back to next appt.     Statin: Yes    Appropriate?: Yes  Changes made:     ACE/ARB: Yes  Appropriate?: Yes  Changes made:     Aspirin: Yes  Appropriate?: Yes  Changes made:     Eating patterns:    [x]  My plate    []  Mediterranean diet   []  Low sodium   []  DASH diet   [x]  Portion control   [x]  Reduced calorie    []  Fast food / Restaurant  []  High glycemic index foods   []  Sugary beverages   []  Other     Comment: see above    Current Medications Affecting Diabetes:  Humulin R 500    Compliant: Yes  Barriers to medication therapy: anxiety / depression    Medications attempted in the past:  Metformin - cardiologist took him off but patient unsure why  Januvia - PCP took him off but patient unsure why    Recent exacerbations / new problems:  Infection / fall - ankle pain    Last office dictation reviewed: yes        type 2 DM under improving control as evidenced by A1C 7.3 on 9/25/20    Plan       Counseling at today's visit:   -Continued monitoring of blood glucose with documentation on log three times a day. -Continued dose of insulin:0.52ml with breakfast, 0.45ml with dinner. Will increase to 0.52ml  with lunch to decrease dinner time glucose slightly. Patient encouraged to call with any blood glucose below 70.  -Patient has enough diabetic supplies and medications.      -Continue Chantix and abstinence from smoking. RX for continuation pack sent to Pharmacy  -Call podiatrist for appt (Dr. Ky Lundborg) and eye appt. -Bring back urine sample at next appt for microalbuminuria. Physician Follow-up:  Dr. Wendie Perez on 1/7/21    Medication Management Follow-up:   Diabetes Service   1 month in person     Electronically signed by Nakul Langford [facility] on 12/2/2020 at 8:10 AM     CLINICAL PHARMACY CONSULT: MED RECONCILIATION/REVIEW [facility]. Tracking Only    PHSO: No  Total # of Interventions Recommended: 3  - Increased Dose #: 1  - Refills Provided #: 1  - Updated Order #: 1 Updated Order Reason(s):  Other  Total Interventions Accepted: 3  Time Spent (min): 45    Yesenia Rizvi, PharmD  [address]

## 2020-12-10 NOTE — PROGRESS NOTES
Speech Language Pathology  Facility/Department: [facility] CARE   BEDSIDE SWALLOW EVALUATION    NAME: Ángel Gibson. : 1965  MRN: [id number]    ADMISSION DATE: 2017  ADMITTING DIAGNOSIS: has DDD (degenerative disc disease); Hypertriglyceridemia; CKD (chronic kidney disease) stage 3, GFR 30-59 ml/min; CAD (coronary artery disease) s/p 1 stent; COPD (chronic obstructive pulmonary disease) ([address] Type 2 diabetes mellitus with diabetic neuropathy, with long-term current use of insulin ([address] Chronic pancreatitis ([address] Displacement of lumbar intervertebral disc without myelopathy; Obesity, morbid (more than 100 lbs over ideal weight or BMI > 40) ([address] Chronic diastolic congestive heart failure ([address] Peripheral neuropathy ([address] Insomnia; BPH (benign prostatic hyperplasia); Smoker; Class 3 obesity due to excess calories with body mass index (BMI) of 50.0 to 59.9 in [facility]); Essential hypertension; Hepatic steatosis; History of rib fracture; Umbilical hernia; Left inguinal hernia; Adrenal gland anomaly; Lumbar disc narrowing; Stenosis, spinal, lumbar; Chronic back pain greater than 3 months duration; Gastroesophageal reflux disease without esophagitis; Hyperlipidemia with target LDL less than 70; Bilateral lower leg cellulitis; Lower urinary tract symptoms (LUTS); Vitamin D deficiency; Iron deficiency anemia due to chronic blood loss; BARBY (obstructive sleep apnea); Chronic midline low back pain with left-sided sciatica; Stasis dermatitis of both legs; Anxiety; Positive depression screening; Moderate recurrent major depression ([address] History of recurrent ear infection; Unintended weight gain; Anemia; Type 2 diabetes mellitus with stage 3 chronic kidney disease, with long-term current use of insulin ([address] Mixed conductive and sensorineural hearing loss of both ears; Tinnitus of both ears; Adhesive capsulitis of left shoulder;  Slow transit constipation; Chronic otitis externa of right ear; Bilateral leg edema; Tinea pedis of both feet; Onychomycosis; MDD (major depressive disorder), recurrent severe, without psychosis ([address] BARBY on CPAP; Intolerance of continuous positive airway pressure (CPAP) ventilation; and Dry skin dermatitis legs on his problem list.      Recent Chest Xray/CT of Chest:   CXR- 11/02- nothing acute    Date of Eval: 11/3/2017  Evaluating Therapist: Nusrat Pedraza    Current Diet level:  Current Diet : Regular  Current Liquid Diet : Thin      Primary Complaint   Pt. Reports sudden onset of throat, B jaw pain beginning at about 1 pm today. He reports painful swallowing. Pain:  Pain Assessment  Patient Currently in Pain: Yes  Pain Assessment: 0-10  Pain Level: 7 (throat, jaw)  Pain Type: Acute pain    Reason for Referral  Miguel Wood. was referred for a bedside swallow evaluation to assess the efficiency of his swallow function, rule out aspiration and make recommendations regarding safe dietary consistencies, effective compensatory strategies, and safe eating environment. Impression  Dysphagia Impression : No overt s/s aspiration demonstrated. Pt. reports his throat and jaw are in a great deal of pain, suddenly starting at 1300 today. Pt. Reports hot or cold beverages do not help or increase pain, pain is constant. ST to f/u on Monday, 11/6, to reassess when throat pain has subsided. Medical management of pain recommended. Treatment Plan  Requires SLP Intervention: Yes             Recommended Diet and Intervention  Diet Solids Recommendation:  (solid diet as tolerated)  Liquid Consistency Recommendation: Thin     Therapeutic Interventions: Diet tolerance monitoring      Treatment/Goals  Dysphagia Goals: The patient will tolerate recommended diet without observed clinical signs of aspiration    General  Behavior/Cognition: Alert; Cooperative  Respiratory Status: Room air  Breath Sounds: Clear  Communication Observation: Functional  Follows Directions: Complex  Dentition: Edentulous  Patient Positioning: Upright in chair (seated EOB)  Baseline Vocal Quality: Normal      Pain Level: 7 (throat, jaw)    Vision/Hearing  Vision  Vision: Within Functional Limits  Hearing  Hearing: Within functional limits    Oral Motor Deficits  Oral/Motor  Oral Motor: Within functional limits    Oral Phase Dysfunction  Oral Phase  Oral Phase: WNL     Indicators of Pharyngeal Phase Dysfunction   Pharyngeal Phase  Pharyngeal Phase: WNL  Pharyngeal Phase   Pharyngeal: No overt s/s aspiration demonstrated. Pt. winced as he swallowed sip of water, d/t throat pain. Education  Patient Education Response: Verbalizes understanding      G-Code  SLP G-Codes  Functional Limitations: Swallowing  Swallow Current Status (): At least 1 percent but less than 20 percent impaired, limited or restricted  Swallow Goal Status (): 0 percent impaired, limited or restricted         Therapy Time  SLP Individual Minutes  Time In: 1500  Time Out: [place]  Minutes: Peyman 56 M. AVimalCCC/SLP    11/3/2017 3:22 PM Doctor's office

## 2020-12-21 ENCOUNTER — PATIENT MESSAGE (OUTPATIENT)
Dept: FAMILY MEDICINE CLINIC | Age: 55
End: 2020-12-21

## 2020-12-21 RX ORDER — OXYCODONE HYDROCHLORIDE 10 MG/1
10 TABLET ORAL EVERY 8 HOURS PRN
Qty: 90 TABLET | Refills: 0 | Status: SHIPPED | OUTPATIENT
Start: 2020-12-21 | End: 2021-01-22 | Stop reason: SDUPTHER

## 2020-12-21 NOTE — TELEPHONE ENCOUNTER
From: Rissa Kwon. To: Kyung Contreras MD  Sent: 12/21/2020 11:32 AM EST  Subject: Non-Urgent Medical Question    Can you please Refill my Oxycodone 10 mg One every 8 hours .  I will be out of them Sunday thank you and have a great Holiday weekend

## 2021-01-08 ENCOUNTER — TELEPHONE (OUTPATIENT)
Dept: FAMILY MEDICINE CLINIC | Age: 56
End: 2021-01-08

## 2021-01-08 NOTE — TELEPHONE ENCOUNTER
Patient called stating that he was exposed to Covid on Monday of this week and found out today. He states that sometime last night or this morning he has experienced some mild chest pain. He states that his breathing has not changed at all. I did advise patient that he needs to quaritine and that if the chest pain becomes worse that he needs to call EMS. He would like your advise on what to do.  Please advise thanks

## 2021-01-08 NOTE — TELEPHONE ENCOUNTER
least 60% alcohol, covering all surfaces of your hands and rubbing them together until they feel dry.   ; Soap and water: Soap and water are the best option if hands are visibly dirty.   ; Avoid touching: Avoid touching your eyes, nose, and mouth with unwashed hands. Handwashing Tips   ; Wet your hands with clean, running water (warm or cold), turn off the tap, and apply soap.  ; Lather your hands by rubbing them together with the soap. Lather the backs of your hands, between your fingers, and under your nails. ; Scrub your hands for at least 20 seconds. Need a timer? Hum the [place] from beginning to end twice.  ; Rinse your hands well under clean, running water.  ; Dry your hands using a clean towel or air dry them. Avoid sharing personal household items   ; Do not share: You should not share dishes, drinking glasses, cups, eating utensils, towels, or bedding with other people or pets in your home.   ; Wash thoroughly after use: After using these items, they should be washed thoroughly with soap and water. Clean all high-touch surfaces everyday   ; Clean and disinfect: Practice routine cleaning of high touch surfaces.  ; High touch surfaces include counters, tabletops, doorknobs, bathroom fixtures, toilets, phones, keyboards, tablets, and bedside tables.  ; Disinfect areas with bodily fluids: Also, clean any surfaces that may have blood, stool, or body fluids on them.   ; Household : Use a household cleaning spray or wipe, according to the label instructions. Labels contain instructions for safe and effective use of the cleaning product including precautions you should take when applying the product, such as wearing gloves and making sure you have good ventilation during use of the product.     Monitor your symptoms   Seek medical attention: Seek prompt medical attention if your illness is worsening     (e.g., difficulty breathing).   ; Call your doctor: Before seeking care, call your healthcare provider and tell them that you have, or are being evaluated for, COVID-19.   ; Wear a facemask when sick: Put on a facemask before you enter the facility. These steps will help the healthcare provider's office to keep other people in the office or waiting room from getting infected or exposed. ; Alert health department: Ask your healthcare provider to call the local or state health department. Persons who are placed under active monitoring or facilitated self-monitoring should follow instructions provided by their local health department or occupational health professionals, as appropriate.  ; Call 911 if you have a medical emergency: If you have a medical emergency and need to call 911, notify the dispatch personnel that you have, or are being evaluated for COVID-19. If possible, put on a facemask before emergency medical services arrive.

## 2021-01-12 ENCOUNTER — PATIENT MESSAGE (OUTPATIENT)
Dept: FAMILY MEDICINE CLINIC | Age: 56
End: 2021-01-12

## 2021-01-12 DIAGNOSIS — K21.9 GASTROESOPHAGEAL REFLUX DISEASE WITHOUT ESOPHAGITIS: Primary | ICD-10-CM

## 2021-01-13 ENCOUNTER — PATIENT MESSAGE (OUTPATIENT)
Dept: FAMILY MEDICINE CLINIC | Age: 56
End: 2021-01-13

## 2021-01-13 DIAGNOSIS — Z79.4 TYPE 2 DIABETES MELLITUS WITH DIABETIC POLYNEUROPATHY, WITH LONG-TERM CURRENT USE OF INSULIN (HCC): ICD-10-CM

## 2021-01-13 DIAGNOSIS — I13.0 BENIGN HYPERTENSIVE HEART AND CKD, STAGE 3 (GFR 30-59), W CHF (HCC): Primary | ICD-10-CM

## 2021-01-13 DIAGNOSIS — E11.42 TYPE 2 DIABETES MELLITUS WITH DIABETIC POLYNEUROPATHY, WITH LONG-TERM CURRENT USE OF INSULIN (HCC): ICD-10-CM

## 2021-01-13 DIAGNOSIS — I50.32 CHRONIC DIASTOLIC CONGESTIVE HEART FAILURE (HCC): ICD-10-CM

## 2021-01-13 DIAGNOSIS — N18.30 BENIGN HYPERTENSIVE HEART AND CKD, STAGE 3 (GFR 30-59), W CHF (HCC): Primary | ICD-10-CM

## 2021-01-13 RX ORDER — PANTOPRAZOLE SODIUM 40 MG/1
40 TABLET, DELAYED RELEASE ORAL
Qty: 90 TABLET | Refills: 1 | Status: SHIPPED | OUTPATIENT
Start: 2021-01-13 | End: 2021-07-20 | Stop reason: SDUPTHER

## 2021-01-13 NOTE — TELEPHONE ENCOUNTER
From: Sharlene Ramos.   To: Ashley Roman MD  Sent: 1/12/2021 8:27 PM EST  Subject: Non-Urgent Medical Question    Can you please Refill my pantoprazole soD dr 40 mg tablets thank you

## 2021-01-14 ENCOUNTER — PATIENT MESSAGE (OUTPATIENT)
Dept: FAMILY MEDICINE CLINIC | Age: 56
End: 2021-01-14

## 2021-01-14 DIAGNOSIS — J44.1 COPD EXACERBATION (HCC): ICD-10-CM

## 2021-01-15 RX ORDER — ALBUTEROL SULFATE 2.5 MG/3ML
2.5 SOLUTION RESPIRATORY (INHALATION) EVERY 6 HOURS PRN
Qty: 120 VIAL | Refills: 3 | Status: SHIPPED | OUTPATIENT
Start: 2021-01-15 | End: 2021-12-27

## 2021-01-15 NOTE — TELEPHONE ENCOUNTER
From: Lora Layton.   To: Sid Aguila MD  Sent: 1/14/2021 9:35 PM EST  Subject: Non-Urgent Medical Question    Can you please Refill my meds for my nebulizer on my last Box thank you

## 2021-01-17 ENCOUNTER — PATIENT MESSAGE (OUTPATIENT)
Dept: FAMILY MEDICINE CLINIC | Age: 56
End: 2021-01-17

## 2021-01-17 DIAGNOSIS — Z71.89 ACP (ADVANCE CARE PLANNING): Primary | ICD-10-CM

## 2021-01-18 NOTE — TELEPHONE ENCOUNTER
From: Merna Raines.   To: Lucy Montaño MD  Sent: 1/17/2021 5:37 PM EST  Subject: Non-Urgent Medical Question    Can you please let me know how I go about getting the paperwork for a living will or a DNR for me to fill out thank you

## 2021-01-19 ENCOUNTER — TELEPHONE (OUTPATIENT)
Dept: PHARMACY | Age: 56
End: 2021-01-19

## 2021-01-19 NOTE — TELEPHONE ENCOUNTER
Spoke with patient for COVID 19 screening secondary to upcoming appointment tomorrow . At this time patient denies symptoms, recent (previous 14 days) positive covid test, recent travel and exposure. Patient will report to [facility] at scheduled time. Patient educated to call physician or go to ER with respiratory symptoms or fever and to not present to appointment if symptomatic. Will coordinate for next appointment accordingly.      Donte Gutierrez, PharmD, 1/19/2021 2:21 PM

## 2021-01-20 ENCOUNTER — HOSPITAL ENCOUNTER (OUTPATIENT)
Dept: PHARMACY | Age: 56
Setting detail: THERAPIES SERIES
Discharge: HOME | End: 2021-01-20
Payer: COMMERCIAL

## 2021-01-20 ENCOUNTER — HOSPITAL ENCOUNTER (OUTPATIENT)
Age: 56
Discharge: HOME | End: 2021-01-20
Payer: COMMERCIAL

## 2021-01-20 ENCOUNTER — TELEPHONE (OUTPATIENT)
Dept: FAMILY MEDICINE CLINIC | Age: 56
End: 2021-01-20

## 2021-01-20 VITALS — WEIGHT: 315 LBS | TEMPERATURE: 96.9 F | BODY MASS INDEX: 58.2 KG/M2

## 2021-01-20 DIAGNOSIS — I13.0 BENIGN HYPERTENSIVE HEART AND CKD, STAGE 3 (GFR 30-59), W CHF (HCC): ICD-10-CM

## 2021-01-20 DIAGNOSIS — E11.42 TYPE 2 DIABETES MELLITUS WITH DIABETIC POLYNEUROPATHY, WITH LONG-TERM CURRENT USE OF INSULIN (HCC): ICD-10-CM

## 2021-01-20 DIAGNOSIS — L73.2 AXILLARY HIDRADENITIS SUPPURATIVA: Primary | ICD-10-CM

## 2021-01-20 DIAGNOSIS — L03.119 CELLULITIS OF AXILLA, UNSPECIFIED LATERALITY: ICD-10-CM

## 2021-01-20 DIAGNOSIS — N18.30 BENIGN HYPERTENSIVE HEART AND CKD, STAGE 3 (GFR 30-59), W CHF (HCC): ICD-10-CM

## 2021-01-20 DIAGNOSIS — I50.32 CHRONIC DIASTOLIC CONGESTIVE HEART FAILURE (HCC): ICD-10-CM

## 2021-01-20 DIAGNOSIS — Z79.4 TYPE 2 DIABETES MELLITUS WITH DIABETIC POLYNEUROPATHY, WITH LONG-TERM CURRENT USE OF INSULIN (HCC): ICD-10-CM

## 2021-01-20 LAB
ALBUMIN SERPL-MCNC: 4.1 G/DL (ref 3.5–5.2)
ALBUMIN/GLOBULIN RATIO: ABNORMAL (ref 1–2.5)
ALP BLD-CCNC: 72 U/L (ref 40–129)
ALT SERPL-CCNC: 16 U/L (ref 5–41)
ANION GAP SERPL CALCULATED.3IONS-SCNC: 11 MMOL/L (ref 9–17)
AST SERPL-CCNC: 15 U/L
BILIRUB SERPL-MCNC: 0.44 MG/DL (ref 0.3–1.2)
BNP INTERPRETATION: NORMAL
BUN BLDV-MCNC: 32 MG/DL (ref 6–20)
BUN/CREAT BLD: ABNORMAL (ref 9–20)
CALCIUM SERPL-MCNC: 9.9 MG/DL (ref 8.6–10.4)
CHLORIDE BLD-SCNC: 97 MMOL/L (ref 98–107)
CO2: 30 MMOL/L (ref 20–31)
CREAT SERPL-MCNC: 1.83 MG/DL (ref 0.7–1.2)
CREATININE URINE: 38.5 MG/DL (ref 39–259)
ESTIMATED AVERAGE GLUCOSE: 203 MG/DL
GFR AFRICAN AMERICAN: 47 ML/MIN
GFR NON-AFRICAN AMERICAN: 38 ML/MIN
GFR SERPL CREATININE-BSD FRML MDRD: ABNORMAL ML/MIN/{1.73_M2}
GFR SERPL CREATININE-BSD FRML MDRD: ABNORMAL ML/MIN/{1.73_M2}
GLUCOSE BLD-MCNC: 190 MG/DL (ref 70–99)
HBA1C MFR BLD: 8.7 % (ref 4–6)
HCT VFR BLD CALC: 37.2 % (ref 41–53)
HEMOGLOBIN: 12.2 G/DL (ref 13.5–17.5)
MAGNESIUM: 1.7 MG/DL (ref 1.6–2.6)
MCH RBC QN AUTO: 27.1 PG (ref 26–34)
MCHC RBC AUTO-ENTMCNC: 32.8 G/DL (ref 31–37)
MCV RBC AUTO: 82.6 FL (ref 80–100)
MICROALBUMIN/CREAT 24H UR: <12 MG/L
MICROALBUMIN/CREAT UR-RTO: ABNORMAL MCG/MG CREAT
NRBC AUTOMATED: ABNORMAL PER 100 WBC
PDW BLD-RTO: 15.8 % (ref 11.5–14.9)
PHOSPHORUS: 4.2 MG/DL (ref 2.5–4.5)
PLATELET # BLD: 194 K/UL (ref 150–450)
PMV BLD AUTO: 8.7 FL (ref 6–12)
POTASSIUM SERPL-SCNC: 4.1 MMOL/L (ref 3.7–5.3)
PRO-BNP: 163 PG/ML
RBC # BLD: 4.51 M/UL (ref 4.5–5.9)
SODIUM BLD-SCNC: 138 MMOL/L (ref 135–144)
TOTAL PROTEIN: 7.5 G/DL (ref 6.4–8.3)
TSH SERPL DL<=0.05 MIU/L-ACNC: 2.26 MIU/L (ref 0.3–5)
WBC # BLD: 8.6 K/UL (ref 3.5–11)

## 2021-01-20 PROCEDURE — 99213 OFFICE O/P EST LOW 20 MIN: CPT

## 2021-01-20 PROCEDURE — 85027 COMPLETE CBC AUTOMATED: CPT

## 2021-01-20 PROCEDURE — 36415 COLL VENOUS BLD VENIPUNCTURE: CPT

## 2021-01-20 PROCEDURE — 82570 ASSAY OF URINE CREATININE: CPT

## 2021-01-20 PROCEDURE — 83880 ASSAY OF NATRIURETIC PEPTIDE: CPT

## 2021-01-20 PROCEDURE — 84443 ASSAY THYROID STIM HORMONE: CPT

## 2021-01-20 PROCEDURE — 80053 COMPREHEN METABOLIC PANEL: CPT

## 2021-01-20 PROCEDURE — 84100 ASSAY OF PHOSPHORUS: CPT

## 2021-01-20 PROCEDURE — 82043 UR ALBUMIN QUANTITATIVE: CPT

## 2021-01-20 PROCEDURE — 83735 ASSAY OF MAGNESIUM: CPT

## 2021-01-20 PROCEDURE — 83036 HEMOGLOBIN GLYCOSYLATED A1C: CPT

## 2021-01-20 RX ORDER — INSULIN HUMAN 500 [IU]/ML
INJECTION, SOLUTION SUBCUTANEOUS
Qty: 60 ML | Refills: 3 | OUTPATIENT
Start: 2021-01-20 | End: 2021-06-28 | Stop reason: SDUPTHER

## 2021-01-20 RX ORDER — CEPHALEXIN 500 MG/1
500 CAPSULE ORAL 4 TIMES DAILY
Qty: 40 CAPSULE | Refills: 0 | Status: SHIPPED | OUTPATIENT
Start: 2021-01-20 | End: 2021-02-09 | Stop reason: ALTCHOICE

## 2021-01-20 RX ORDER — DOXYCYCLINE HYCLATE 100 MG
100 TABLET ORAL 2 TIMES DAILY
Qty: 20 TABLET | Refills: 0 | Status: SHIPPED | OUTPATIENT
Start: 2021-01-20 | End: 2021-01-30

## 2021-01-20 RX ORDER — VARENICLINE TARTRATE 1 MG/1
1 TABLET, FILM COATED ORAL 2 TIMES DAILY
Qty: 60 TABLET | Refills: 1 | Status: SHIPPED | OUTPATIENT
Start: 2021-01-20 | End: 2021-04-05 | Stop reason: ALTCHOICE

## 2021-01-20 RX ORDER — KETOCONAZOLE 20 MG/G
CREAM TOPICAL
Qty: 1 TUBE | Refills: 3 | Status: SHIPPED | OUTPATIENT
Start: 2021-01-20 | End: 2022-10-23 | Stop reason: SDUPTHER

## 2021-01-20 NOTE — PROGRESS NOTES
Diabetic Medication Management   [facility] [address]    [address] [facility]. [place], [address] [facility]  Phone: [phone]  Fax: [phone]    NAME: Michelle Rascon. MEDICAL RECORD NUMBER:  [id number]  AGE: 63 y.o. GENDER: male  : 1965  EPISODE DATE:  2021       Mr. Sofi Ashby was referred to [address] [place] [address] Medication Management Services by Dr. Lora Ochoa, Special instructions include: titrate all medications (as defined in clinic's policy and procedure)    Patient seen in office. Goals per referral:   Fasting blood glucose: < 130  Peak postprandial glucose: < 180  A1C: < 7    Other goals:  Blood pressure goal: 130/80  Weight loss goal (~10%): Target weight  410 to be reached by date: Mar 2021 (3-6 months)  Physical Activity goal:    10 minutes three times per week to be reached by date: Mar 2021 (3-6 months)  Smoking Cessation   Quit Date: Stopped 20. Currently using Chantix  Cholesterol at target:   Date: Yes LDL 82  HDL 33 Trig 180 (20)  Annual eye exam:    Date: Cancelled due to illness - will call to reschedule  Comprehensive annual foot exam:   Date: No due to covid  Annual urine Albumin and serum creatinine:   Date: Needs updated microalbumin <12 mg/L (21), SrCr 1.83 mg/dL (21)        Subjective   Mr. Sofi Ashby is a 63 y.o. male here for the Diabetes Service for self-management education, medication review including over the counter medications and herbal products, overall well-being assessment, transition of care and any needed adjustments with updates and recommendations communicated to the referring physician. Patient's name and  verified.      Patient Findings: Patient states on 1/16 he was driving and got disoriented and ended up getting lost.  Ended up coming too enough to call his daughter who tracked his phone to find his location. Patient made family take him home. States he felt he was back to normal in a couple hours. Patient states he had check pain also that lasted about 2 days. Patient states Nitroglycerin helped him but did not relieve it. Patient states he was not going to the hospital.  Encouraged him to at least discuss with his physician. Patient requested living will and DNR information from his PCP. Intends to fill it out. No further falls since 11/9. Patient states has not recovered fully from the fall. Still feels he can not move his neck as much and is still painful. Pain in groin/lower abdomen has gotten a bit worse lately. Has no further follow up as patient states he was told not surgical candidate and nothing else to do. Patient states he has his skin infection back in armpits (both), groin and ears. Has not discussed with PCP. Encouraged patient to discuss with physician. States he will just get antibiotic and he has these at home. Has been taking for 3 days. States he is taking cephalexin and he is taking four times a day and he has about 5 days at home. Will call physician and ask for refill so patient can finish complete course. Patient had been on doxycycline with cephalexin in the past but does not have any at home. Will see if PCP wants to call this in also. Patient is not smoked other than 2 cigarettes on Dec 30 due to patient's sister passing. This happens to also have been his birthday. Patient continues on Chantix. Will call an additional month into pharmacy as patient feels a bit more time will help him not smoke again. Patient has been on doxycycline for infection in arm pit. Finished doxycycline a few days ago. Has improved but still has it. This has been chronic issue for patient. Encouraged patient to continue to inform provider if worsens to avoid larger issue. Niece pos and was in prox. Was more SOB but was out of Advair. Has since gotten and had all inhalers. States he is not sure he is using then correctly. Asked patient to bring in inhalers at next appt for us to review. Double puffing    Patient states anxiety/depression is worse and expects it to get even worse with holidays. Patient stays in his room. Waiting till after the holiday to look for another possible place to live. Patient is up about 28 lbs in last two months. Admits to not taking bumetanide as instructed. States he has only been taking them once a day as if he takes them twice he will not be able to leave the house. Encouraged patient to monitor weight once a day at the same time of day (has a scale at home) and if up 3 or more lbs please take the bumex twice that day. Patient agrees. Patient admits to not being as good with diet but not eating sweets. Not eating any different foods. Admits to eating more at night as he can avoid family. Has been eating more at night then was a month ago. Patient has been taking insulin dose as instructed at 0.52ml, 0.52ml, 0.45ml (breakfast, lunch and dinner). Patient has not had any further hypoglycemic episodes but overall blood sugars have been elevated. Possibly due to infection. Patient had well controlled blood sugar prior to this visit. Patient needs eye appt as had to cancel due to illness. Encouraged patient to call and schedule another appt. Message sent to PCP for RX for antibiotics (cephalexin and doxycycline) and refills on mupirocin and ketoconazole. Refills for Chantix and Humulin R 500 [facility]     []  Missed doses   []  Emergency Room Visit [x]  Medication changes  []  Hospitalization   [x]  Diet changes   [x]  Acute illness   []  Activity changes      Symptoms of hypoglycemia    [x]  None    []  Shakiness    []  Lightheadedness or dizziness   []  Confusion      []  Sweating   []  Other        Symptoms of hyperglycemia -.    [x]  None   []  Frequent urination    []  Increased thirst   []  Other    Medication adverse reactions (none due to diabetic medicaitons)   [x]  None   []  Diarrhea / Nausea / Vomiting / Constipation / flatulence   []  Hypertension   []  Peripheral edema     []  Signs of infection   []  Headache   []  Vision changes   []  Increased cholesterol    []  Weight gain   []  Change in renal function   []  Increased potassium  []  Other      Comments:  Patient takes Humulin R U500. Patient has been taking Humulin R U500 0.52ml with breakfast, 0.52ml with lunch and 0.45 with dinner.      If recent hospital admission / ED visit, was this related to Diabetes No:Fall Discharge date 11/9/20    Objective     PMHx:    Past Medical History:   Diagnosis Date    Acute bronchitis with chronic obstructive pulmonary disease (COPD) ([address]     Acute on chronic kidney failure [facility] 7/20/2017    Acute on chronic respiratory failure (HCC) 10/2/2018    Adhesive capsulitis of left shoulder 3/25/2017    Anxiety 10/2/2016    Arthropathy, unspecified, other specified sites 6/13/2013    Asthma     Bilateral lower leg cellulitis 2/17/2016    Blood in stool     CAD (coronary artery disease)     Cellulitis of both lower extremities 5/25/2017    Cellulitis of leg, left 07/20/2017    CHF (congestive heart failure), NYHA class III ([facility]) 8/14/2013    Chronic back pain     Chronic bronchitis ([facility])     Chronic headaches     was referred to neuro, testing scheduled    Chronic kidney disease     Chronic respiratory failure ([address]     Chronic ulcer of left leg, with fat layer exposed ([address] 2/22/2019  Class 2 severe obesity due to excess calories with serious comorbidity and body mass index (BMI) of 35.0 to 35.9 in adult ([facility])     (BMI 35.0-39.9 without comorbidity)    COPD (chronic obstructive pulmonary disease) ([facility])     COPD exacerbation ([address] 11/2/2016    Diabetic neuropathy ([address] 8/14/2013    Displacement of lumbar intervertebral disc without myelopathy 6/13/2013    Ear infection     RIGHT    Essential hypertension     Facial cellulitis 2012    Fall 3/25/2017    Former smoker     GERD (gastroesophageal reflux disease)     Head injury     Hearing loss in right ear     pencil pierced ear as a child    Hepatic steatosis 12/3/2015    History of general anesthesia complication     has woke up during surgery under anesthesia    History of rib fracture 12/3/2015    Chronic     Hyperlipidemia     Hypersomnia     Hypertension     Insomnia     Intolerance of continuous positive airway pressure (CPAP) ventilation 7/20/2017    Mastoiditis of left side     Mixed conductive and sensorineural hearing loss of both ears 1/10/2017    Per ENT    Mixed type COPD (chronic obstructive pulmonary disease) ([address]     Moderate recurrent major depression ([address] 10/2/2016    Morbid obesity with BMI of 45.0-49.9, adult ([address] 6/16/2015    Neuropathy     Open wound of groin 12/19/2018    BARBY on CPAP     BARBY treated with BiPAP     Osteoarthritis     Otitis externa of left ear     Pancreatitis chronic     Persistent depressive disorder 11/19/2019    Renal insufficiency     proteinuria    S/P cardiac cath 04/23/2018    Severe depression ([address] 9/25/2013    Spinal stenosis of lumbar region without neurogenic claudication 1/6/2016 Prior to Admission medications    Medication Sig Start Date End Date Taking? Authorizing Provider   varenicline (CHANTIX CONTINUING MONTH PAK) 1 MG tablet Take 1 tablet by mouth 2 times daily 1/20/21  Yes Vasile Hurley MD   cephALEXin (KEFLEX) 500 MG capsule Take 1 capsule by mouth 4 times daily 1/20/21   Vasile Hurley MD   doxycycline hyclate (VIBRA-TABS) 100 MG tablet Take 1 tablet by mouth 2 times daily for 10 days 1/20/21 1/30/21  Vasile Hurley MD   mupirocin (BACTROBAN) 2 % ointment Apply topically 2 times daily on the affected area for 7-10 days. OK to substitute to cream 1/20/21   Vasile Hurley MD   ketoconazole (NIZORAL) 2 % cream Apply twice a day for yeast infection in the skin folds, for 4 weeks 1/20/21   Vasile Hurley MD   chlorhexidine gluconate (ANTISEPTIC SKIN CLEANSER) 4 % SOLN external solution Mix 4 oz solution in a bathtub full of water and have baths daily for 1 week, then weekly, for skin decontamination 1/20/21   Vasile Hurley MD   albuterol (PROVENTIL) (2.5 MG/3ML) 0.083% nebulizer solution Take 3 mLs by nebulization every 6 hours as needed for Wheezing or Shortness of Breath 1/15/21   Vasile Hurley MD   pantoprazole (PROTONIX) 40 MG tablet Take 1 tablet by mouth every morning (before breakfast) 1/13/21   Vasile Hurley MD   oxyCODONE HCl (OXY-IR) 10 MG immediate release tablet Take 1 tablet by mouth every 8 hours as needed for Pain for up to 30 days.  12/21/20 1/20/21  Vasile Hurley MD   varenicline (CHANTIX CONTINUING MONTH PAK) 1 MG tablet Take 1 tablet by mouth 2 times daily 12/2/20 1/20/21  Vasile Hurley MD   Ferrous Sulfate (IRON) 325 (65 Fe) MG TABS Take 1 tablet by mouth daily 11/25/20   Vasile Hurley MD   chlorhexidine (HIBICLENS) 4 % external liquid Mix 4 oz solution in a bathtub full of water and have baths daily for 1 week, then weekly, for decontamination 11/20/20 1/20/21  Vasile Hurley MD mupirocin (BACTROBAN) 2 % ointment Apply topically 2 times daily on the affected area for 7-10 days. OK to substitute to cream 11/20/20 1/20/21  Vasile Hurley MD   metoprolol tartrate (LOPRESSOR) 50 MG tablet Take 1 tablet by mouth 2 times daily 11/4/20   Vasile Hurley MD   magnesium oxide (MAG-OX) 400 (240 Mg) MG tablet Take 1 tablet by mouth daily 11/4/20   Vasile Hurley MD   lisinopril (PRINIVIL;ZESTRIL) 5 MG tablet Take 1 tablet by mouth daily . Discontinued Lisinopril  10 mg 11/2/20   Vasile Hurley MD   fluticasone-salmeterol (ADVAIR HFA) 230-21 MCG/ACT inhaler Inhale 2 puffs into the lungs 2 times daily 10/19/20   Vasile Hurley MD   tiotropium (SPIRIVA RESPIMAT) 2.5 MCG/ACT AERS inhaler Inhale 2 puffs into the lungs daily 10/19/20   Vasile Hurley MD   venlafaxine (EFFEXOR XR) 75 MG extended release capsule Take 1 capsule by mouth daily Take with food 10/14/20   Vasile Hurley MD   naloxone 4 MG/0.1ML LIQD nasal spray 1 spray by Nasal route as needed for Opioid Reversal Due to COPD and sleep apnea, this is a big recommendation for you 9/30/20   Vasile Hurley MD   blood glucose monitor strips Test 3 times a day & as needed for symptoms of irregular blood glucose. Dispense sufficient amount for indicated testing frequency plus additional to accommodate PRN testing needs. One touch Ultra blue 9/30/20   Claudia Johnson MD   Lancets MISC Use to check blood sugar three times daily along with when necessary due to symptoms. 9/30/20   Claudia Johnson MD   Insulin Syringe-Needle U-100 31G X 5/16\" 1 ML MISC Use to subcutaneously inject insulin three times daily 9/30/20   Claudia Johnson MD   neomycin-polymyxin-hydrocortisone (CORTISPORIN) 3.5-01291-4 otic suspension Place 3 drops into both ears 4 times daily OK to substitute.  For 10 days 9/16/20   Vaisle Hurley MD ketoconazole (NIZORAL) 2 % cream Apply topically  2 times a day x 4-6 weeks, right palm 8/31/20 1/20/21  Dirk Ha MD   vitamin D3 (CHOLECALCIFEROL) 25 MCG (1000 UT) TABS tablet Take 1 tablet by mouth daily 8/26/20   Dirk Ha MD   clopidogrel (PLAVIX) 75 MG tablet TAKE ONE TABLET BY MOUTH DAILY 7/29/20   Dirk Ha MD   vitamin B-12 (CYANOCOBALAMIN) 500 MCG tablet Take 1 tablet by mouth daily 7/13/20   Dirk Ha MD   tiZANidine (ZANAFLEX) 4 MG tablet TAKE ONE TABLET BY MOUTH EVERY 8 HOURS AS NEEDED FOR BACK PAIN 7/8/20   Dirk Ha MD   insulin regular human (HUMULIN R U-500) 500 UNIT/ML concentrated injection vial Inject 0.5 ml with breakfast, 0.55 ml with lunch and 0.45 ml with dinner. Patient taking differently: Inject 0.52 ml with breakfast, 0.52 ml with lunch and 0.45 ml with dinner. 6/25/20   Dirk Ha MD   escitalopram (LEXAPRO) 10 MG tablet Take 1 tablet by mouth daily 6/17/20   Dirk Ha MD   pravastatin (PRAVACHOL) 40 MG tablet Take 1 tablet by mouth nightly 5/20/20   Dirk Ha MD   Oxygen Tubing MISC by Does not apply route DX COPD. chronic respiratory failure 3/26/20   Dirk Ha MD   spironolactone (ALDACTONE) 50 MG tablet Take 1 tablet by mouth daily 3/11/20   Dirk Ha MD   Respiratory Therapy Supplies (NEBULIZER/TUBING/MOUTHPIECE) KIT Dx COPD needs nebulizer supplies 2/20/20   Dirk Ha MD   nitroGLYCERIN (NITROSTAT) 0.4 MG SL tablet Place 1 tablet under the tongue every 5 minutes as needed for Chest pain (and call 911) 2/13/20   Dirk Ha MD   docusate sodium (STOOL SOFTENER) 100 MG capsule Take 1 capsule by mouth 2 times daily 1/15/20   Dirk Ha MD   ONE TOUCH ULTRASOFT LANCETS MISC Patient to test blood sugar up to 4 times daily.  11/7/19   Jake Suarez MD   bumetanide (BUMEX) 1 MG tablet Take 3 tablets by mouth 2 times daily  Pneumococcal Polysaccharide (Nxyzccafr83) 05/23/2013    Tdap (Boostrix, Adacel) 09/12/2018       Home Blood Glucose Results:   Patient brought in glucose log. Fasting: (newest to oldest)   [phone]        Before lunch (newest to oldest)  [phone]    Before dinner (newest to oldest)  [phone] 12    Blood sugars overall have increased. Unsure if due to illness/infection or due to diet. Patient insists he is not eating significantly different. IN past blood sugars have increased with infection. Will not increase insulin dose for fear of hypoglycemia that patient has experienced in past. Hope to get infection under control and see blood sugars improve. If does not will increase insulin dose. Assessment     A1c at goal:No:  8.7 (1/20/21) (increasing)  Blood Pressure at goal: not done in office today  Weight at goal: No: increased significantly from last dose. Physical activity: No  Physical activity at goal: No  Patient encouraged but unable to due SOB. Smoking Cessation: Yes    Cholesterol at target:  Yes  Annual eye exam completed: No - needs to scheduled  Comprehensive Foot Exam Completed: No - TBD  Annual urine albumin and serum creatinine: Yes    Statin: Yes    Appropriate?: Yes  Changes made:     ACE/ARB: Yes  Appropriate?: Yes  Changes made:     Aspirin: Yes  Appropriate?: Yes  Changes made:     Eating patterns:    [x]  My plate    []  Mediterranean diet   []  Low sodium   []  DASH diet   [x]  Portion control   [x]  Reduced calorie    []  Fast food / Restaurant  []  High glycemic index foods   []  Sugary beverages   []  Other     Comment: see above    Current Medications Affecting Diabetes:  Humulin R 500    Compliant: Yes  Barriers to medication therapy: anxiety / depression    Medications attempted in the past: Metformin - cardiologist took him off but patient unsure why  Januvia - PCP took him off but patient unsure why    Recent exacerbations / new problems:  Infection / fall - ankle pain    Last office dictation reviewed: yes        type 2 DM under worsening control as evidenced by A1C 8.7 on 1/20/21    Plan       Counseling at today's visit:   -Continued monitoring of blood glucose with documentation on log three times a day. -Continued dose of insulin:0.52ml with breakfast, 0.52ml with lunch 0.45ml with dinner. Patient encouraged to call with any blood glucose below 70.  -Patient has enough diabetic supplies and medications other than Humulin R 500 sent to pharmacy.      -Continue Chantix and abstinence from smoking. RX for continuation pack sent to Pharmacy  -Call podiatrist for appt (Dr. Amee Alicea) and eye appt. Try to watch diet, take bumex as instructed when weight increased and take antibiotics to control infection.     Physician Follow-up:   Dr Janny Jasmine    Medication Management Follow-up:   Diabetes Service   3 weeks in person    Electronically signed by Ruchi Diaz RPH on 1/20/2021 at 3:42 PM     CLINICAL PHARMACY CONSULT: [facility] Tracking Only    PHSO: No  Total # of Interventions Recommended: 5  - Refills Provided #: 5  - Maintenance Safety Lab Monitoring #: 1  Total Interventions Accepted: 5  Time Spent (min): 45    Yesenia Rizvi, PharmD  [address]

## 2021-01-20 NOTE — TELEPHONE ENCOUNTER
----- Message from Miguel A Son, [facility] sent at 1/20/2021 11:03 AM EST -----  Regarding: Miguel cloud  Hello,  I saw Pepito Prettymonico today and he reports his infection is back in his arm pits, groin and ears. He had some cephalexin at home that he started about 3 days ago taking it four times a day. He only has 2 days left for a total of 5 days. Last time he took 10 days I believe along with doxycycline for 7 days. Could you send some prescriptions in for him since you are aware this is a recurring problem? I know he is not excited about any more appts to come out for. Additionally, he needs refills on his mupirocin and ketoconazole creams. [facility] [address] [facility] Thank you!   Lauryl

## 2021-01-21 ENCOUNTER — TELEPHONE (OUTPATIENT)
Dept: FAMILY MEDICINE CLINIC | Age: 56
End: 2021-01-21

## 2021-01-21 NOTE — RESULT ENCOUNTER NOTE
Fozia comment sent to patient.    Worsening diabetes low-carb diet advised  Blood glucose high 190  Congestive heart failure is stable, there is no retention of fluid in the lungs  Chronic kidney disease stable from 2 months ago    White blood count is normal, no signs of infection in the blood but the anemia is slightly worse, you will need colonoscopy down the road  Future Appointments  2/10/2021  7:50 AM    ST MEDICATION MGMT       [facility] MED MGMT   [facility]  2/23/2021  8:00 AM    Bell Champagne MD     [facility]

## 2021-01-21 NOTE — TELEPHONE ENCOUNTER
Forms for FMLA for wife received, needs appointment  Was last seen in September    BP Readings from Last 3 Encounters:   11/09/20 (!) 153/76   02/20/20 120/80   02/13/20 (!) 140/70

## 2021-01-22 ENCOUNTER — PATIENT MESSAGE (OUTPATIENT)
Dept: FAMILY MEDICINE CLINIC | Age: 56
End: 2021-01-22

## 2021-01-22 DIAGNOSIS — G89.29 CHRONIC BACK PAIN GREATER THAN 3 MONTHS DURATION: ICD-10-CM

## 2021-01-22 DIAGNOSIS — M51.36 DDD (DEGENERATIVE DISC DISEASE), LUMBAR: ICD-10-CM

## 2021-01-22 DIAGNOSIS — M54.42 CHRONIC MIDLINE LOW BACK PAIN WITH LEFT-SIDED SCIATICA: Primary | ICD-10-CM

## 2021-01-22 DIAGNOSIS — G89.29 CHRONIC MIDLINE LOW BACK PAIN WITH LEFT-SIDED SCIATICA: Primary | ICD-10-CM

## 2021-01-22 DIAGNOSIS — M54.9 CHRONIC BACK PAIN GREATER THAN 3 MONTHS DURATION: ICD-10-CM

## 2021-01-22 DIAGNOSIS — M96.1 POSTLAMINECTOMY SYNDROME OF LUMBAR REGION: ICD-10-CM

## 2021-01-22 DIAGNOSIS — M48.061 SPINAL STENOSIS OF LUMBAR REGION WITHOUT NEUROGENIC CLAUDICATION: ICD-10-CM

## 2021-01-22 RX ORDER — OXYCODONE HYDROCHLORIDE 10 MG/1
10 TABLET ORAL EVERY 8 HOURS PRN
Qty: 90 TABLET | Refills: 0 | Status: SHIPPED | OUTPATIENT
Start: 2021-01-22 | End: 2021-02-23 | Stop reason: SDUPTHER

## 2021-01-22 NOTE — TELEPHONE ENCOUNTER
From: Theron Aguilar.   To: Sujata Monteiro MD  Sent: 1/22/2021 9:00 AM EST  Subject: Non-Urgent Medical Question    Can you please Refill my Oxycodone for me I will be out of them Dariusz night thank you very much

## 2021-01-27 ENCOUNTER — OFFICE VISIT (OUTPATIENT)
Dept: FAMILY MEDICINE CLINIC | Age: 56
End: 2021-01-27
Payer: COMMERCIAL

## 2021-01-27 VITALS
TEMPERATURE: 97.3 F | HEIGHT: 72 IN | SYSTOLIC BLOOD PRESSURE: 150 MMHG | HEART RATE: 95 BPM | BODY MASS INDEX: 42.66 KG/M2 | OXYGEN SATURATION: 97 % | DIASTOLIC BLOOD PRESSURE: 98 MMHG | WEIGHT: 315 LBS

## 2021-01-27 DIAGNOSIS — I13.0 BENIGN HYPERTENSIVE HEART AND CKD, STAGE 3 (GFR 30-59), W CHF (HCC): ICD-10-CM

## 2021-01-27 DIAGNOSIS — L40.9 PSORIASIS: ICD-10-CM

## 2021-01-27 DIAGNOSIS — Z79.4 TYPE 2 DIABETES MELLITUS WITH DIABETIC POLYNEUROPATHY, WITH LONG-TERM CURRENT USE OF INSULIN (HCC): Primary | ICD-10-CM

## 2021-01-27 DIAGNOSIS — F33.41 RECURRENT MAJOR DEPRESSIVE DISORDER, IN PARTIAL REMISSION (HCC): ICD-10-CM

## 2021-01-27 DIAGNOSIS — N18.30 BENIGN HYPERTENSIVE HEART AND CKD, STAGE 3 (GFR 30-59), W CHF (HCC): ICD-10-CM

## 2021-01-27 DIAGNOSIS — M48.061 SPINAL STENOSIS OF LUMBAR REGION WITHOUT NEUROGENIC CLAUDICATION: ICD-10-CM

## 2021-01-27 DIAGNOSIS — R56.9 SEIZURES (HCC): ICD-10-CM

## 2021-01-27 DIAGNOSIS — J44.9 MIXED TYPE COPD (CHRONIC OBSTRUCTIVE PULMONARY DISEASE) (HCC): ICD-10-CM

## 2021-01-27 DIAGNOSIS — B35.4 TINEA CORPORIS: ICD-10-CM

## 2021-01-27 DIAGNOSIS — E11.42 TYPE 2 DIABETES MELLITUS WITH DIABETIC POLYNEUROPATHY, WITH LONG-TERM CURRENT USE OF INSULIN (HCC): Primary | ICD-10-CM

## 2021-01-27 DIAGNOSIS — E66.01 MORBID OBESITY WITH BMI OF 50.0-59.9, ADULT (HCC): ICD-10-CM

## 2021-01-27 DIAGNOSIS — I50.32 CHRONIC DIASTOLIC CONGESTIVE HEART FAILURE (HCC): ICD-10-CM

## 2021-01-27 DIAGNOSIS — G47.33 OSA TREATED WITH BIPAP: ICD-10-CM

## 2021-01-27 PROCEDURE — 2022F DILAT RTA XM EVC RTNOPTHY: CPT | Performed by: FAMILY MEDICINE

## 2021-01-27 PROCEDURE — 3017F COLORECTAL CA SCREEN DOC REV: CPT | Performed by: FAMILY MEDICINE

## 2021-01-27 PROCEDURE — 3052F HG A1C>EQUAL 8.0%<EQUAL 9.0%: CPT | Performed by: FAMILY MEDICINE

## 2021-01-27 PROCEDURE — G8417 CALC BMI ABV UP PARAM F/U: HCPCS | Performed by: FAMILY MEDICINE

## 2021-01-27 PROCEDURE — G8926 SPIRO NO PERF OR DOC: HCPCS | Performed by: FAMILY MEDICINE

## 2021-01-27 PROCEDURE — G8482 FLU IMMUNIZE ORDER/ADMIN: HCPCS | Performed by: FAMILY MEDICINE

## 2021-01-27 PROCEDURE — G8427 DOCREV CUR MEDS BY ELIG CLIN: HCPCS | Performed by: FAMILY MEDICINE

## 2021-01-27 PROCEDURE — 1036F TOBACCO NON-USER: CPT | Performed by: FAMILY MEDICINE

## 2021-01-27 PROCEDURE — 3023F SPIROM DOC REV: CPT | Performed by: FAMILY MEDICINE

## 2021-01-27 PROCEDURE — 99214 OFFICE O/P EST MOD 30 MIN: CPT | Performed by: FAMILY MEDICINE

## 2021-01-27 RX ORDER — NYSTATIN 100000 [USP'U]/G
POWDER TOPICAL
Qty: 56.7 G | Refills: 3 | Status: SHIPPED | OUTPATIENT
Start: 2021-01-27 | End: 2021-03-31 | Stop reason: SDUPTHER

## 2021-01-27 RX ORDER — CLOBETASOL PROPIONATE 0.5 MG/G
OINTMENT TOPICAL
Qty: 1 TUBE | Refills: 3 | Status: SHIPPED | OUTPATIENT
Start: 2021-01-27 | End: 2022-10-23 | Stop reason: SDUPTHER

## 2021-01-27 ASSESSMENT — PATIENT HEALTH QUESTIONNAIRE - PHQ9
SUM OF ALL RESPONSES TO PHQ QUESTIONS 1-9: 0
SUM OF ALL RESPONSES TO PHQ QUESTIONS 1-9: 0
1. LITTLE INTEREST OR PLEASURE IN DOING THINGS: 0

## 2021-01-27 ASSESSMENT — ENCOUNTER SYMPTOMS
COUGH: 1
ABDOMINAL DISTENTION: 0
VOMITING: 0
ABDOMINAL PAIN: 0
CONSTIPATION: 0
WHEEZING: 0
BACK PAIN: 1
SHORTNESS OF BREATH: 1
DIARRHEA: 0
CHEST TIGHTNESS: 0
NAUSEA: 0

## 2021-01-27 NOTE — PROGRESS NOTES
Visit Information    Have you changed or started any medications since your last visit including any over-the-counter medicines, vitamins, or herbal medicines? no   Are you having any side effects from any of your medications? -  no  Have you stopped taking any of your medications? Is so, why? -  no    Have you seen any other physician or provider since your last visit? Yes - Records Obtained  Have you had any other diagnostic tests since your last visit? No  Have you been seen in the emergency room and/or had an admission to a hospital since we last saw you? No  Have you had your routine dental cleaning in the past 6 months? no    Have you activated your [employer] account? If not, what are your barriers?  Yes     Patient Care Team:  Jcarlos Wood MD as PCP - General (Family Medicine)  Jcarlos Wood MD as PCP - [facility]  Stephon Chaves MD as Consulting Physician (Endocrinology)  Nicolette Noguera DO as Consulting Physician (Cardiology)  Karen Zapata DPM as Surgeon (Podiatry)  Lord Erik MD as Consulting Physician (Ophthalmology)  Rhonda Henry MD as Consulting Physician (Nephrology)  Dennie Race, MD as Consulting Physician (Pulmonology)  Raheem Rajput MD as Surgeon (Vascular Surgery)  Shane Dubois MD as Consulting Physician (Gastroenterology)  Tiffany Lim, [address] [place] [address] as Pharmacist (Pharmacist)  Roddy Delgado MD as Consulting Physician (Pulmonology)  Mary Anne Calderon MD as Consulting Physician (Urology)  Get Alcala MD as Surgeon (Ophthalmology)  Karina Leal MD as Consulting Physician (Neurology)  Tom Elizabeth ([address] [place] [address])  Victorino Wallis MD as Surgeon (Otolaryngology)    Medical History Review  Past Medical, Family, and Social History reviewed and does contribute to the patient presenting condition    Health Maintenance   Topic Date Due    Shingles Vaccine (1 of 2) 12/30/2014    Colon cancer screen colonoscopy  11/03/2017    Diabetic retinal exam  10/24/2019    Diabetic foot exam  05/02/2020    Annual Wellness Visit (AWV)  02/20/2021    A1C test (Diabetic or Prediabetic)  04/20/2021    Lipid screen  09/25/2021    Potassium monitoring  01/20/2022    Creatinine monitoring  01/20/2022    DTaP/Tdap/Td vaccine (3 - Td) 09/12/2028    Hepatitis B vaccine  Completed    Flu vaccine  Completed    Pneumococcal 0-64 years Vaccine  Completed    Hepatitis C screen  Completed    HIV screen  Completed    Hepatitis A vaccine  Aged Out    Hib vaccine  Aged Out    Meningococcal (ACWY) vaccine  Aged Out

## 2021-01-27 NOTE — PATIENT INSTRUCTIONS
Patient Education        Diabetes Blood Sugar Emergencies: Your Action Plan  How can you prevent a blood sugar emergency? An important part of living with diabetes is keeping your blood sugar in your target range. You'll need to know what to do if it's too high or too low. Managing your blood sugar levels helps you avoid emergencies. This care sheet will teach you about the signs of high and low blood sugar. It will help you make an action plan with your doctor for when these signs occur. Low blood sugar is more likely to happen if you take certain medicines for diabetes. It can also happen if you skip a meal, drink alcohol, or exercise more than usual.  You may get high blood sugar if you eat differently than you normally do. One example is eating more carbohydrate than usual. Having a cold, the flu, or other sudden illness can also cause high blood sugar levels. Levels can also rise if you miss a dose of medicine. Any change in how you take your medicine may affect your blood sugar level. So it's important to work with your doctor before you make any changes. Check your blood sugar  Work with your doctor to fill in the blank spaces below that apply to you. Track your levels, know your target range, and write down ways you can get your blood sugar back in your target range. A log book can help you track your levels. Take the book to all of your medical appointments. · Check your blood sugar _____ times a day, at these times:________________________________________________. (For example: Before meals, at bedtime, before exercise, during exercise, other.)  · Your blood sugar target range before a meal is ___________________. Your blood sugar target range after a meal is _______________________. · Do this--___________________________________________________--to get your blood sugar back within your safe range if your blood sugar results are _________________________________________.  (For example: Less than 70 Health. If you have questions about a medical condition or this instruction, always ask your healthcare professional. [address] any warranty or liability for your use of this information. Patient Education        Learning About Diabetes Food Guidelines  Your Care Instructions     Meal planning is important to manage diabetes. It helps keep your blood sugar at a target level (which you set with your doctor). You don't have to eat special foods. You can eat what your family eats, including sweets once in a while. But you do have to pay attention to how often you eat and how much you eat of certain foods. You may want to work with a dietitian or a certified diabetes educator (CDE) to help you plan meals and snacks. A dietitian or CDE can also help you lose weight if that is one of your goals. What should you know about eating carbs? Managing the amount of carbohydrate (carbs) you eat is an important part of healthy meals when you have diabetes. Carbohydrate is found in many foods. · Learn which foods have carbs. And learn the amounts of carbs in different foods. ? Bread, cereal, pasta, and rice have about 15 grams of carbs in a serving. A serving is 1 slice of bread (1 ounce), ½ cup of cooked cereal, or 1/3 cup of cooked pasta or rice. ? Fruits have 15 grams of carbs in a serving. A serving is 1 small fresh fruit, such as an apple or orange; ½ of a banana; ½ cup of cooked or canned fruit; ½ cup of fruit juice; 1 cup of melon or raspberries; or 2 tablespoons of dried fruit. ? Milk and no-sugar-added yogurt have 15 grams of carbs in a serving. A serving is 1 cup of milk or 2/3 cup of no-sugar-added yogurt. ? Starchy vegetables have 15 grams of carbs in a serving. A serving is ½ cup of mashed potatoes or sweet potato; 1 cup winter squash; ½ of a small baked potato; ½ cup of cooked beans; or ½ cup cooked corn or green peas.   · Learn how much carbs to eat each day and at each meal. A dietitian or CDE can teach you how to keep track of the amount of carbs you eat. This is called carbohydrate counting. · If you are not sure how to count carbohydrate grams, use the Plate Method to plan meals. It is a good, quick way to make sure that you have a balanced meal. It also helps you spread carbs throughout the day. ? Divide your plate by types of foods. Put non-starchy vegetables on half the plate, meat or other protein food on one-quarter of the plate, and a grain or starchy vegetable in the final quarter of the plate. To this you can add a small piece of fruit and 1 cup of milk or yogurt, depending on how many carbs you are supposed to eat at a meal.  · Try to eat about the same amount of carbs at each meal. Do not \"save up\" your daily allowance of carbs to eat at one meal.  · Proteins have very little or no carbs per serving. Examples of proteins are beef, chicken, turkey, fish, eggs, tofu, cheese, cottage cheese, and peanut butter. A serving size of meat is 3 ounces, which is about the size of a deck of cards. Examples of meat substitute serving sizes (equal to 1 ounce of meat) are 1/4 cup of cottage cheese, 1 egg, 1 tablespoon of peanut butter, and ½ cup of tofu. How can you eat out and still eat healthy? · Learn to estimate the serving sizes of foods that have carbohydrate. If you measure food at home, it will be easier to estimate the amount in a serving of restaurant food. · If the meal you order has too much carbohydrate (such as potatoes, corn, or baked beans), ask to have a low-carbohydrate food instead. Ask for a salad or green vegetables. · If you use insulin, check your blood sugar before and after eating out to help you plan how much to eat in the future. · If you eat more carbohydrate at a meal than you had planned, take a walk or do other exercise. This will help lower your blood sugar. What else should you know?   · Limit saturated fat, such as the fat from meat and dairy products. This is a healthy choice because people who have diabetes are at higher risk of heart disease. So choose lean cuts of meat and nonfat or low-fat dairy products. Use olive or canola oil instead of butter or shortening when cooking. · Don't skip meals. Your blood sugar may drop too low if you skip meals and take insulin or certain medicines for diabetes. · Check with your doctor before you drink alcohol. Alcohol can cause your blood sugar to drop too low. Alcohol can also cause a bad reaction if you take certain diabetes medicines. Follow-up care is a key part of your treatment and safety. Be sure to make and go to all appointments, and call your doctor if you are having problems. It's also a good idea to know your test results and keep a list of the medicines you take. Where can you learn more? Go to https://[employer]. org and sign in to your [employer] account. Enter Y178 in the Tryouts box to learn more about \"Learning About Diabetes Food Guidelines. \"     If you do not have an account, please click on the \"Sign Up Now\" link. Current as of: December 20, 2019               Content Version: 12.6  © 8354-2053 [employer]. Care instructions adapted under license by [facility] ([facility]). If you have questions about a medical condition or this instruction, always ask your healthcare professional. [address] any warranty or liability for your use of this information.

## 2021-01-27 NOTE — PROGRESS NOTES
Rhonda Cobian (:  1965) is a 63 y.o. male,Established patient, here for evaluation of the following chief complaint(s): Diabetes, Forms (update fmla), COPD, Hypertension, Congestive Heart Failure, Back Pain, and Rash      ASSESSMENT/PLAN:    1. Type 2 diabetes mellitus with diabetic polyneuropathy, with long-term current use of insulin (HCC)  worsening    Lab Results   Component Value Date    LABA1C 8.7 (H) 2021    LABA1C 7.3 (H) 2020    LABA1C 9.5 (H) 2020     -advised home blood glucose testing 3 times daily  -daily feet exam, Foot care: advised to wash feet daily, pat dry and apply lotion at night, avoiding between toes. Need to look at feet daily and report to a physician any signs of inflammation or skin damage  -annual dilated eye exam  -Low carb, low fat diet, increase fruits and vegetables, and exercise 4-5 times a day 30-40 minutes a day discussed  -continue current treatment and follow-up with pharmacist who is managing his high dosage of insulin/Humulin R 500  -continue Aspirin  -continue ACEI and statin        2. Chronic diastolic congestive heart failure (HCC)  Stable  Lab Results   Component Value Date    LVEF 53 2018    LVEFMODE Echo 2017     Discussed low salt diet and BP, WT, and pulse monitoring daily  Continue Lopressor , Bumex,, Lisinopril, spironolactone, metolazone      3. Benign hypertensive heart and CKD, stage 3 (GFR 30-59), w CHF ([facility] [address]  Stable chronic kidney disease stage III  Inadequately controlled BP  Discussed low salt diet and BP and pulse monitoring daily  He is very anxious today due to this visit, and risk of COVID-19 infection  Patient says his blood pressure is well controlled at home, he will inform me if the blood pressure stays high  Continue current medications  He declines any new labs as he recently had blood work    4.  Mixed type COPD (chronic obstructive pulmonary disease) (HCC)  Stable  Continue Spiriva, Advair, albuterol as needed and continue to abstain from smoking  Oxygen at nighttime at 2 L/min  5. Seizures ([address]  Inadequately controlled  He is not on antiseizure medications  Advised not to drive until cleared by neurologist  The patient verbalizes understanding and agrees with the plan. -     Shara Smalls MD, Neurology, [place]  6. Morbid obesity with BMI of 50.0-59.9, adult (HCC)  Worsening  Low-carb, low-fat diet, attempt to increase activity level    Wt Readings from Last 3 Encounters:   01/27/21 (!) 424 lb (192.3 kg)   01/20/21 (!) 429 lb 1.6 oz (194.6 kg)   11/09/20 (!) 401 lb (181.9 kg)       7. Spinal stenosis of lumbar region without neurogenic claudication  Likely worsening due to wear-and-tear nature of degenerative disc disease  History of back surgery x4  Back pain slightly improved with medications we gave him: Venlafaxine, oxycodone, tizanidine 4 mg as needed  Also advised to continue on heating pad, stretching, repositioning    8. BARBY treated with BiPAP  Stable  He benefits from using the BiPAP    9. Recurrent major depressive disorder, in partial remission (HCC)  Improved  Continue venlafaxine, has good family support  We will continue to monitor  10. Tinea corporis/axillary bilaterally  Worsening  -restart     nystatin (MYCOSTATIN) 799847 UNIT/GM powder; Apply 1-2 times daily in the skin folds. , Disp-56.7 g, R-3, Normal  11. Psoriasis  Worsening  -     clobetasol (TEMOVATE) 0.05 % ointment; Apply topically 2 times daily for psoriasis, Disp-1 Tube, R-3, Normal  Declines referral to dermatologist at this time due to ongoing COVID-19 pandemic      FMLA done with patient and his wife    Controlled Substance Monitoring:    Acute and Chronic Pain Monitoring:   RX Monitoring 1/27/2021   Attestation -   Periodic Controlled Substance Monitoring Possible medication side effects, risk of tolerance/dependence & alternative treatments discussed. ;No signs of potential drug abuse or diversion identified. ;Assessed functional status. Chronic Pain > 50 MEDD -   Chronic Pain > 80 MEDD -           Miguel received counseling on the following healthy behaviors: nutrition, exercise, medication adherence, tobacco cessation and weight loss    Reviewed prior labs and health maintenance  Discussed use, benefit, and side effects of prescribed medications. Barriers to medication compliance addressed. Patient given educational materials - see patient instructions  Was a self-tracking handout given in paper form or via [employer]? Yes  All patient questions answered. Patient voiced understanding. The patient's past medical,surgical, social, and family history as well as his current medications and allergies were reviewed as documented in today's encounter. Medications, labs, diagnostic studies, consultations and follow-up as documented in this encounter. Return for KEEP APPT. Data Unavailable      SUBJECTIVE/OBJECTIVE:    Patient comes with his wife Janet Smith, for her [place] extension as family member helping my patient who has multiple comorbidities, help with ADLs, emotional support, transportation to appointments. Diabetes Mellitus Type II, Follow-up:    Current symptoms/problems include hyperglycemia, paresthesia of the feet and visual disturbances. Symptoms have been present for several years. Has been mostly going to pharmacy for the management of diabetes and to myself only.   Not going to Dr. Lazara Aquino anymore    Known diabetic complications: nephropathy, peripheral neuropathy, impotence and cardiovascular disease  Cardiovascular risk factors: advanced age (older than 54 for men, 72 for women), diabetes mellitus, dyslipidemia, hypertension, male gender, obesity (BMI >= 30 kg/m2), sedentary lifestyle and smoking/ tobacco exposure    Medication compliance:  compliant all of the time  Current diabetic medications include intensive insulin injection program.       Eye exam current (within one year): yes, had eye surgery  Current diet: in general, a \"healthy\" diet  , diabetic, low salt    Barriers: impairment:  hearing, visual, physical: COPD, congestive heart failure, chronic back pain, and mental health: Depression and grief as he lost his sister last year. Current monitoring regimen: home blood tests - 3 times daily  Home blood sugar records:  100-165  Any episodes of hypoglycemia? no    Is He on ACE inhibitor or angiotensin II receptor blocker? Yes      reports that he quit smoking about 2 months ago. His smoking use included cigarettes. He started smoking about 35 years ago. He has a 8.25 pack-year smoking history. He quit smokeless tobacco use about 25 years ago. His smokeless tobacco use included snuff. Counseling given: Yes      Daily Aspirin? Yes      A1c is worsening  Lab Results   Component Value Date    LABA1C 8.7 (H) 01/20/2021    LABA1C 7.3 (H) 09/25/2020    LABA1C 9.5 (H) 07/13/2020            Hypertension, congestive heart failure and CKD stage 3, coronary artery disease:   Miguel  is not  exercising and is adherent to low salt diet. Cardiac symptoms  dyspnea, fatigue and lower extremity edema. Patient denies  chest pain, chest pressure/discomfort, claudication, exertional chest pressure/discomfort, irregular heart beat, near-syncope, orthopnea, palpitations, paroxysmal nocturnal dyspnea, syncope and tachypnea. Use of agents associated with hypertension: none. History of target organ damage: angina/ prior myocardial infarction, chronic kidney disease, heart failure and prior coronary revascularization, has one stent.   Patient admits that he did not see the cardiologist, I advised him to make appointment with cardiologist.    Blood pressure is high, he says he did take his blood pressure medication this morning  BP Readings from Last 3 Encounters:   01/27/21 (!) 150/98   11/09/20 (!) 153/76   02/20/20 120/80        Pulse is Normal.    Pulse Readings from Last 3 Encounters:   01/27/21 95 11/09/20 90   02/20/20 79         COPD:  Current treatment includes short-acting beta agonist inhaler, long-acting beta agonist inhaler, combined beta agonist/steroid inhaler, anticholinergic inhaler, which has been effective. Patient is also on oxygen at nighttime at 2 L/min. Residual symptoms: chronic dyspnea and non-productive cough Patient reports shortness of breath is worse in closed rooms and due to the face coverings. He denies purulent sputum, wheezing. Patient reports his COPD is at baseline  Nicotine dependence. Patient says he quit smoking again 45 days ago, praise given    Counseling given: Yes    Pulse ox is stable normal at room air at rest  SpO2 Readings from Last 4 Encounters:   01/27/21 97%   11/09/20 96%   02/20/20 96%   02/13/20 98%       Patient's wife says he had a seizure last Friday,  \"2 in one day\". He is not taking anything for seizures, he is still driving occasionally but mostly family members driving him  He says he did see neurologist, but he couldn't fit in the MRI so he never got the MRI  Patient was strongly encouraged not to drive anymore. The patient verbalizes understanding and agrees with the plan. Patient's wife says she herself and the daughter have been helping him and driving him around. Due to the coronavirus pandemic they do not go anywhere anyway  He is agreeable to see a new neurologist.    Patient has chronic low back pain, midline and radiates to the left side. He has history of 4 back surgeries  Intensity of pain 8 out of 10 today, worse with sitting, activities or standing up for too long  He is on muscle relaxant, oxycodone, denies side effects, tolerated well. Patient says it helps with the pain and it makes it more tolerable His wife and his daughter also helps him with ADLs  Also applying heating pad . Sleep Apnea:  Current treatment: BiPAP. Compliance: compliant all of the time. Residual symptoms include: daytime fatigue.     Patient recently had another flareup of axillary hidradenitis treated with antibiotics. Patient says it is getting better, but he says the skin in the armpits is getting red again. He has not been using the nystatin powder and he does admit to a lot of sweating. We discussed to restart applying the nystatin powder. Patient also has a lot of dry scaly rash on the dorsum of the right wrist, behind the ears and on the ears  It is very itchy, thick, dry  Has been putting creams over    Depression  Has been better than before  Does not go anywhere due to coronavirus pandemic and being afraid of catching it. Denies suicidal ideation, plan or intent. PHQ-2 Over the past 2 weeks, how often have you been bothered by any of the following problems? Little interest or pleasure in doing things: Not at all  Feeling down, depressed, or hopeless: Not at all  PHQ-2 Score: 0  PHQ-9 Over the past 2 weeks, how often have you been bothered by any of the following problems? PHQ-9 Total Score: 0    Improved depression  PHQ Scores 1/27/2021 2/20/2020 11/19/2019 6/19/2019 3/15/2019 2/22/2019 10/23/2018   PHQ2 Score 0 3 2 4 6 2 6   PHQ9 Score 0 17 2 14 17 11 11       Prior to Visit Medications    Medication Sig Taking? Authorizing Provider   oxyCODONE HCl (OXY-IR) 10 MG immediate release tablet Take 1 tablet by mouth every 8 hours as needed for Pain for up to 30 days. Yes Ashley Roman MD   varenicline (CHANTIX CONTINUING MONTH GUALBERTO) 1 MG tablet Take 1 tablet by mouth 2 times daily Yes Ashley Roman MD   cephALEXin (KEFLEX) 500 MG capsule Take 1 capsule by mouth 4 times daily Yes Ashley Roman MD   doxycycline hyclate (VIBRA-TABS) 100 MG tablet Take 1 tablet by mouth 2 times daily for 10 days Yes Ashley Roman MD   mupirocin (BACTROBAN) 2 % ointment Apply topically 2 times daily on the affected area for 7-10 days.  OK to substitute to cream Yes Ashley Roman MD   ketoconazole (NIZORAL) 2 % cream Apply twice a day for yeast infection in the skin folds, for 4 weeks Yes Vasile Hurley MD   insulin regular human (HUMULIN R) 500 UNIT/ML concentrated injection vial Patient using 0.52ml with breakfast, 0.52ml with lunch and 0.45ml with dinner. Yes Claudia Johnson MD   albuterol (PROVENTIL) (2.5 MG/3ML) 0.083% nebulizer solution Take 3 mLs by nebulization every 6 hours as needed for Wheezing or Shortness of Breath Yes Vasile Hurley MD   pantoprazole (PROTONIX) 40 MG tablet Take 1 tablet by mouth every morning (before breakfast) Yes Vasile Hurley MD   Ferrous Sulfate (IRON) 325 (65 Fe) MG TABS Take 1 tablet by mouth daily Yes Vasile Hurley MD   metoprolol tartrate (LOPRESSOR) 50 MG tablet Take 1 tablet by mouth 2 times daily Yes Vasile Hurley MD   magnesium oxide (MAG-OX) 400 (240 Mg) MG tablet Take 1 tablet by mouth daily Yes Vasile Hurley MD   lisinopril (PRINIVIL;ZESTRIL) 5 MG tablet Take 1 tablet by mouth daily . Discontinued Lisinopril  10 mg Yes Vasile Hurley MD   fluticasone-salmeterol (ADVAIR HFA) 230-21 MCG/ACT inhaler Inhale 2 puffs into the lungs 2 times daily Yes Vasile Hurley MD   tiotropium (SPIRIVA RESPIMAT) 2.5 MCG/ACT AERS inhaler Inhale 2 puffs into the lungs daily Yes Vasile Hurley MD   venlafaxine (EFFEXOR XR) 75 MG extended release capsule Take 1 capsule by mouth daily Take with food Yes Vasile Hurley MD   naloxone 4 MG/0.1ML LIQD nasal spray 1 spray by Nasal route as needed for Opioid Reversal Due to COPD and sleep apnea, this is a big recommendation for you Yes Vasile Hurley MD   blood glucose monitor strips Test 3 times a day & as needed for symptoms of irregular blood glucose. Dispense sufficient amount for indicated testing frequency plus additional to accommodate PRN testing needs. One touch Ultra blue Yes Claudia Johnson MD   Lancets MISC Use to check blood sugar three times daily along with when necessary due to symptoms.  Yes Barbi Infante MD   Insulin Syringe-Needle U-100 31G X 5/16\" 1 ML MISC Use to subcutaneously inject insulin three times daily Yes Barbi Infante MD   neomycin-polymyxin-hydrocortisone (CORTISPORIN) 3.5-61321-6 otic suspension Place 3 drops into both ears 4 times daily OK to substitute. For 10 days Yes Ashley Roman MD   vitamin D3 (CHOLECALCIFEROL) 25 MCG (1000 UT) TABS tablet Take 1 tablet by mouth daily Yes Ashley Roman MD   clopidogrel (PLAVIX) 75 MG tablet TAKE ONE TABLET BY MOUTH DAILY Yes Ashley Roman MD   tiZANidine (ZANAFLEX) 4 MG tablet TAKE ONE TABLET BY MOUTH EVERY 8 HOURS AS NEEDED FOR BACK PAIN Yes Ashley Roman MD   escitalopram (LEXAPRO) 10 MG tablet Take 1 tablet by mouth daily Yes Ashley Roman MD   pravastatin (PRAVACHOL) 40 MG tablet Take 1 tablet by mouth nightly Yes Ashley Roman MD   Oxygen Tubing MISC by Does not apply route DX COPD. chronic respiratory failure Yes Ashley Roman MD   spironolactone (ALDACTONE) 50 MG tablet Take 1 tablet by mouth daily Yes Ashley Roman MD   Respiratory Therapy Supplies (NEBULIZER/TUBING/MOUTHPIECE) KIT Dx COPD needs nebulizer supplies Yes Ashley Roman MD   nitroGLYCERIN (NITROSTAT) 0.4 MG SL tablet Place 1 tablet under the tongue every 5 minutes as needed for Chest pain (and call 911) Yes Ashley Roman MD   docusate sodium (STOOL SOFTENER) 100 MG capsule Take 1 capsule by mouth 2 times daily Yes Ashley Roman MD   ONE TOUCH ULTRASOFT LANCETS MISC Patient to test blood sugar up to 4 times daily. Yes Barbi Infante MD   bumetanide (BUMEX) 1 MG tablet Take 3 tablets by mouth 2 times daily  Patient taking differently: Take 3 mg by mouth daily  Yes Ashley Roman MD   nystatin (MYCOSTATIN) 540426 UNIT/GM powder Apply 2-3 times daily in skin folds.  Yes Ashley Roman MD   Lancet Devices (LANCING DEVICE) MISC Provide patient with lancing device appropriate for his machine/lancing needles. Yes Dirk Ha MD   Lidocaine 4 % LOTN Apply topically Yes Historical Provider, MD   metolazone (ZAROXOLYN) 2.5 MG tablet Take 2.5 mg by mouth three times a week MWF Yes Historical Provider, MD   PROAIR  (90 Base) MCG/ACT inhaler INHALE TWO PUFFS BY MOUTH EVERY 6 HOURS AS NEEDED FOR WHEEZING OR SHORTNESS OF BREATH Yes Pebbles Martinez MD   Handicap Placard MISC by Does not apply route Can't walk greater than 200 feet. Expires in 5 years. Yes Dirk Ha MD   fluticasone (CUTIVATE) 0.05 % cream Apply topically 2 times daily  Yes Historical Provider, MD   fluticasone (FLONASE) 50 MCG/ACT nasal spray 2 sprays by Nasal route daily  Patient taking differently: 2 sprays by Nasal route daily as needed (sinus symptoms)  Yes Paige Bryant MD   Melatonin 10 MG TABS Take 10 mg by mouth nightly as needed (insomnia) Yes Dirk Ha MD   aspirin 81 MG EC tablet Take 81 mg by mouth daily. Yes Historical Provider, MD   vitamin B-12 (CYANOCOBALAMIN) 500 MCG tablet Take 1 tablet by mouth daily  Patient not taking: Reported on 2021  Dirk Ha MD   Spacer/Aero Chamber Mouthpiece MISC 1 each by Does not apply route once as needed (to be used with his inhalers)  Dirk Ha MD            Social History     Tobacco Use    Smoking status: Former Smoker     Packs/day: 0.25     Years: 33.00     Pack years: 8.25     Types: Cigarettes     Start date: 1985     Quit date: 2020     Years since quittin.2    Smokeless tobacco: Former User     Types: Snuff     Quit date: 1995   Substance Use Topics    Alcohol use: No     Alcohol/week: 0.0 standard drinks    Drug use: Yes     Types: Marijuana         Review of Systems   Constitutional: Positive for fatigue and unexpected weight change. Negative for activity change, appetite change, chills, diaphoresis and fever. HENT: Positive for hearing loss (chronic) and tinnitus (b/l, chronic).     Eyes: Positive for visual disturbance (stable, chronic). Respiratory: Positive for cough and shortness of breath. Negative for chest tightness and wheezing. Cardiovascular: Positive for leg swelling. Negative for chest pain and palpitations. Gastrointestinal: Negative for abdominal distention, abdominal pain, constipation, diarrhea, nausea and vomiting. Endocrine: Negative for cold intolerance, heat intolerance, polydipsia, polyphagia and polyuria. Musculoskeletal: Positive for back pain and gait problem. Ambulates with cane in the office, his daughter is waiting outside with wheelchair   Skin: Positive for rash. Allergic/Immunologic: Positive for immunocompromised state (due to diabetes). Neurological: Positive for seizures and numbness (and burning in his feet since 2000). Hematological: Does not bruise/bleed easily. Psychiatric/Behavioral: Negative for dysphoric mood and sleep disturbance. The patient is nervous/anxious.          -vital signs stable and within normal limits except Morbid obesity per BMI and Uncontrolled BP. .   BP (!) 150/98   Pulse 95   Temp 97.3 °F (36.3 °C) (Temporal)   Ht 6' (1.829 m)   Wt (!) 424 lb (192.3 kg)   SpO2 97%   BMI 57.50 kg/m²         Physical Exam  Vitals signs and nursing note reviewed. Constitutional:       General: He is not in acute distress. Appearance: Normal appearance. He is well-developed. He is obese. He is not diaphoretic. HENT:      Head: Normocephalic and atraumatic. Comments: Very swollen ear canals due to chronic otitis externa and psoriasis, he was referred to ENT multiple times but didn't go     Mouth/Throat:      Comments: I did not examine the mouth due to coronavirus pandemic and wearing masks. Eyes:      General: Lids are normal. No scleral icterus. Right eye: No discharge. Left eye: No discharge. Extraocular Movements: Extraocular movements intact.       Conjunctiva/sclera: Conjunctivae normal.   Neck: Musculoskeletal: Normal range of motion and neck supple. Thyroid: No thyromegaly. Cardiovascular:      Rate and Rhythm: Normal rate and regular rhythm. Heart sounds: Normal heart sounds. No murmur. Pulmonary:      Effort: Pulmonary effort is normal. No respiratory distress. Breath sounds: Normal breath sounds. No wheezing or rales. Comments: He is breathing with pursed lips, he says it is the same as before, but the face covering makes him photophobic and short of breath  Chest:      Chest wall: No tenderness. Abdominal:      General: Bowel sounds are normal. There is no distension. Palpations: Abdomen is soft. There is no hepatomegaly or splenomegaly. Tenderness: There is abdominal tenderness in the epigastric area. There is no right CVA tenderness or left CVA tenderness. Comments: Very obese abdomen, tender in the epigastrium same as before, he does not want to go to any specialist at this time due to the coronavirus pandemic   Musculoskeletal:      Lumbar back: He exhibits decreased range of motion, tenderness, bony tenderness, pain and spasm. Right lower leg: Pitting Edema present. Left lower leg: Pitting Edema present. Lymphadenopathy:      Cervical: No cervical adenopathy. Skin:     General: Skin is warm and dry. Capillary Refill: Capillary refill takes less than 2 seconds. Findings: Rash present. Comments: Bilateral stasis dermatitis on the legs purplish violaceous, not warm. Bilateral erythematous rash in the armpits, not warm, intertrigo noticed. There is lichenification in the armpits,  on the neck and behind the ears, with moderate amount of scale, likely psoriasis    On the dorsum of the right hand there is a lots of scaling and lichenification on an erythematous base likely psoriasis. Ears are swollen, scaly, decreased hearing   Neurological:      Mental Status: He is alert and oriented to person, place, and time.       Gait: Gait 9 - 17 mmol/L Final    Alkaline Phosphatase 01/20/2021 72  40 - 129 U/L Final    ALT 01/20/2021 16  5 - 41 U/L Final    AST 01/20/2021 15  <40 U/L Final    Total Bilirubin 01/20/2021 0.44  0.3 - 1.2 mg/dL Final    Total Protein 01/20/2021 7.5  6.4 - 8.3 g/dL Final    Albumin 01/20/2021 4.1  3.5 - 5.2 g/dL Final    Albumin/Globulin Ratio 01/20/2021 NOT REPORTED  1.0 - 2.5 Final    GFR Non- 01/20/2021 38* >60 mL/min Final    GFR  01/20/2021 47* >60 mL/min Final    GFR Comment 01/20/2021        Final    Comment: Average GFR for 52-63 years old:   80 mL/min/1.73sq m  Chronic Kidney Disease:   <60 mL/min/1.73sq m  Kidney failure:   <15 mL/min/1.73sq m              eGFR calculated using average adult body mass.  Additional eGFR calculator available at:        [email or web address]            GFR Staging 01/20/2021 NOT REPORTED   Final    WBC 01/20/2021 8.6  3.5 - 11.0 k/uL Final    RBC 01/20/2021 4.51  4.5 - 5.9 m/uL Final    Hemoglobin 01/20/2021 12.2* 13.5 - 17.5 g/dL Final    Hematocrit 01/20/2021 37.2* 41 - 53 % Final    MCV 01/20/2021 82.6  80 - 100 fL Final    MCH 01/20/2021 27.1  26 - 34 pg Final    MCHC 01/20/2021 32.8  31 - 37 g/dL Final    RDW 01/20/2021 15.8* 11.5 - 14.9 % Final    Platelets 05/98/6730 194  150 - 450 k/uL Final    MPV 01/20/2021 8.7  6.0 - 12.0 fL Final    NRBC Automated 01/20/2021 NOT REPORTED  per 100 WBC Final         Lab Results   Component Value Date    CHOL 151 09/25/2020    CHOL 139 09/12/2019    CHOL 161 09/20/2018     Lab Results   Component Value Date    TRIG 180 (H) 09/25/2020    TRIG 247 (H) 09/12/2019    TRIG 312 (H) 09/20/2018     Lab Results   Component Value Date    HDL 33 (L) 09/25/2020    HDL 35 (L) 03/03/2020    HDL 29 (L) 09/12/2019     Lab Results   Component Value Date    LDLCHOLESTEROL 82 09/25/2020    LDLCHOLESTEROL 75 03/03/2020    LDLCHOLESTEROL 61 09/12/2019     Lab Results   Component electronic signature was used to authenticate this note.   Electronically signed by Leonardo Hernandez MD on 2/1/2021 at 6:57 PM

## 2021-02-01 PROBLEM — B35.4 TINEA CORPORIS: Status: ACTIVE | Noted: 2021-02-01

## 2021-02-05 ENCOUNTER — OFFICE VISIT (OUTPATIENT)
Dept: NEUROLOGY | Age: 56
End: 2021-02-05
Payer: COMMERCIAL

## 2021-02-05 VITALS
SYSTOLIC BLOOD PRESSURE: 80 MMHG | HEART RATE: 62 BPM | WEIGHT: 315 LBS | BODY MASS INDEX: 42.66 KG/M2 | DIASTOLIC BLOOD PRESSURE: 60 MMHG | HEIGHT: 72 IN | OXYGEN SATURATION: 99 % | TEMPERATURE: 97.2 F

## 2021-02-05 DIAGNOSIS — R56.9 SEIZURES (HCC): ICD-10-CM

## 2021-02-05 DIAGNOSIS — E08.42 DIABETIC POLYNEUROPATHY ASSOCIATED WITH DIABETES MELLITUS DUE TO UNDERLYING CONDITION (HCC): ICD-10-CM

## 2021-02-05 DIAGNOSIS — R51.9 CHRONIC DAILY HEADACHE: ICD-10-CM

## 2021-02-05 DIAGNOSIS — M54.40 CHRONIC LOW BACK PAIN WITH SCIATICA, SCIATICA LATERALITY UNSPECIFIED, UNSPECIFIED BACK PAIN LATERALITY: Primary | ICD-10-CM

## 2021-02-05 DIAGNOSIS — G89.29 CHRONIC LOW BACK PAIN WITH SCIATICA, SCIATICA LATERALITY UNSPECIFIED, UNSPECIFIED BACK PAIN LATERALITY: Primary | ICD-10-CM

## 2021-02-05 PROCEDURE — G8482 FLU IMMUNIZE ORDER/ADMIN: HCPCS | Performed by: PSYCHIATRY & NEUROLOGY

## 2021-02-05 PROCEDURE — G8427 DOCREV CUR MEDS BY ELIG CLIN: HCPCS | Performed by: PSYCHIATRY & NEUROLOGY

## 2021-02-05 PROCEDURE — 2022F DILAT RTA XM EVC RTNOPTHY: CPT | Performed by: PSYCHIATRY & NEUROLOGY

## 2021-02-05 PROCEDURE — G8417 CALC BMI ABV UP PARAM F/U: HCPCS | Performed by: PSYCHIATRY & NEUROLOGY

## 2021-02-05 PROCEDURE — 1036F TOBACCO NON-USER: CPT | Performed by: PSYCHIATRY & NEUROLOGY

## 2021-02-05 PROCEDURE — 99215 OFFICE O/P EST HI 40 MIN: CPT | Performed by: PSYCHIATRY & NEUROLOGY

## 2021-02-05 PROCEDURE — 3017F COLORECTAL CA SCREEN DOC REV: CPT | Performed by: PSYCHIATRY & NEUROLOGY

## 2021-02-05 RX ORDER — AMITRIPTYLINE HYDROCHLORIDE 25 MG/1
TABLET, FILM COATED ORAL
Qty: 60 TABLET | Refills: 3 | Status: SHIPPED
Start: 2021-02-05 | End: 2021-07-20 | Stop reason: DRUGHIGH

## 2021-02-05 ASSESSMENT — ENCOUNTER SYMPTOMS
DIARRHEA: 1
COUGH: 1
BACK PAIN: 1
SHORTNESS OF BREATH: 1

## 2021-02-05 NOTE — PROGRESS NOTES
Subjective:      Patient ID: Lilliana Quintana. is a 63 y.o. male. HPI  Active problem provoked seizures in 2019 attributed to prozac and chantixx none since this medication was stopped . Scalp paresthesias which are benign along with tinnitus attributed to sensorineural hearing loss last seen in August 2019. The condition is he has had no seizures since last seen having had three seizures altogether . He has morbid obesity and COPD getting short winded on walking short distances using wheel chair for longer distances. There will be leg giveway although bigger issue will be shortness of breath . He has diabetes mellitus with neuropaty with numbness to mid lower legs . There will be burning stabbing pain in his feet . There is chronic low back pain with baseline aching stabbing at grade 8 over 10 more with activity with radiation down left leg . He is on oxycodone qid . He has had four low back surgeries with prior pain clinic evaluation gettig epidurals not wantingto radiofrequency. There will be numbness of fingers . He reports pain control is adequate. He has sleep apnea using nasal CPAP on nightly basis . There are mild memory complaints . There are headaches over back of head on daily basis . He had left ear infection needing surgery with thereon having spinal leakage needing patch . Testing carotid US nonstenotic plaque formation , May 2017 . Cardiac 2 D echo normal LVF , EF 55-60% . Enlarged left atrium . Mild MR and TR , August 2017 . Head CT nomral brain , June 2017 .  EEG normal , July 2019      Past Medical History:   Diagnosis Date    Acute bronchitis with chronic obstructive pulmonary disease (COPD) ([address]     Acute on chronic kidney failure ([address] 7/20/2017    Acute on chronic respiratory failure ([address] 10/2/2018    Adhesive capsulitis of left shoulder 3/25/2017    Anxiety 10/2/2016    Arthropathy, unspecified, other specified sites 6/13/2013    Asthma     Bilateral lower leg cellulitis 2/17/2016    Blood in stool     CAD (coronary artery disease)     Cellulitis of both lower extremities 5/25/2017    Cellulitis of leg, left 07/20/2017    CHF (congestive heart failure), NYHA class III ([facility]) 8/14/2013    Chronic back pain     Chronic bronchitis ([facility])     Chronic headaches     was referred to neuro, testing scheduled    Chronic kidney disease     Chronic respiratory failure ([facility])     Chronic ulcer of left leg, with fat layer exposed ([address] 2/22/2019    Class 2 severe obesity due to excess calories with serious comorbidity and body mass index (BMI) of 35.0 to 35.9 in adult ([facility])     (BMI 35.0-39.9 without comorbidity)    COPD (chronic obstructive pulmonary disease) ([address]     COPD exacerbation ([address] 11/2/2016    Diabetic neuropathy ([address] 8/14/2013    Displacement of lumbar intervertebral disc without myelopathy 6/13/2013    Ear infection     RIGHT    Essential hypertension     Facial cellulitis 2012    Fall 3/25/2017    Former smoker     GERD (gastroesophageal reflux disease)     Head injury     Hearing loss in right ear     pencil pierced ear as a child    Hepatic steatosis 12/3/2015    History of general anesthesia complication     has woke up during surgery under anesthesia    History of rib fracture 12/3/2015    Chronic     Hyperlipidemia     Hypersomnia     Hypertension     Insomnia     Intolerance of continuous positive airway pressure (CPAP) ventilation 7/20/2017    Mastoiditis of left side     Mixed conductive and sensorineural hearing loss of both ears 1/10/2017    Per ENT    Mixed type COPD (chronic obstructive pulmonary disease) ([address]     Moderate recurrent major depression ([address] 10/2/2016    Morbid obesity with BMI of 45.0-49.9, adult ([address] 6/16/2015    Neuropathy     Open wound of groin 12/19/2018    BARBY on CPAP     BARBY treated with BiPAP     Osteoarthritis     Otitis externa of left ear     Pancreatitis chronic     Persistent depressive disorder 11/19/2019    Renal insufficiency     proteinuria    S/P cardiac cath 04/23/2018    Severe depression ([address] 9/25/2013    Spinal stenosis of lumbar region without neurogenic claudication 1/6/2016    MRI lumbar 12/30/15 L3-L4: There is broad-based bulging disc which appears protruding left laterally causing flattening of the ventral thecal sac. In addition, there is facet arthropathy with mild hypertrophic changes.  There is borderline central canal stenosis with  evidence of moderate left neural foraminal narrowing and mild right neural foramina narrowing.   L4-L5: There is broad-based protrud    Syncope 4/28/2017    Tinnitus of both ears 1/10/2017    Per ENT    Type 2 diabetes mellitus with stage 3 chronic kidney disease, with long-term current use of insulin ([address] 12/26/2016    due to underlying condition with hyperosmolarity without coma    Type II or unspecified type diabetes mellitus without mention of complication, not stated as uncontrolled     uncontrolled    Wears glasses     for reading    Wound of left leg 2/22/2019       Past Surgical History:   Procedure Laterality Date    BACK SURGERY   (x 4) 2000,.12/2011.2/2012     Dr Tre Garcia last 2 surg    CARDIAC CATHETERIZATION  04/23/2018    no stenting    CATARACT REMOVAL WITH IMPLANT Right 11/05/2019    [facility]    CATARACT REMOVAL WITH IMPLANT Left 01/07/2020    [facility]    COLONOSCOPY  11/3/2015    hemorrhoids, poor prep    CORONARY ANGIOPLASTY WITH STENT PLACEMENT  March 2013    x 1    HAND TENDON SURGERY Left     thumb tendon repair    INTRACAPSULAR CATARACT EXTRACTION Right 11/5/2019    EYE CATARACT EMULSIFICATION IOL IMPLANT performed by New Lowe MD at [facility] Left 1/7/2020    EYE CATARACT EMULSIFICATION IOL IMPLANT performed by New Lowe MD at [facility] ARTHROSCOPY Left     NERVE BLOCK  07-31-15    TENS unit    DC ESOPHAGOGASTRODUODENOSCOPY TRANSORAL DIAGNOSTIC N/A 2018    EGD ESOPHAGOGASTRODUODENOSCOPY performed by Madalyn Vickers MD at [facility] TYMPANOMASTOIDECTOMY Bilateral 2012    Dr Yasmin Magallon  2013    normal       Family History   Problem Relation Age of Onset    Heart Disease Mother          age 64 from MI    High Blood Pressure Mother     Diabetes Mother     High Blood Pressure Father          age 80 from CKD and Lung Fibrosis    Kidney Disease Father     Heart Disease Sister     Heart Attack Sister     Obesity Sister     Diabetes Sister        Social History     Socioeconomic History    Marital status:      Spouse name: None    Number of children: None    Years of education: None    Highest education level: None   Occupational History    Occupation: disability     Comment: unemployed   Social Needs    Financial resource strain: None    Food insecurity     Worry: None     Inability: None    Transportation needs     Medical: None     Non-medical: None   Tobacco Use    Smoking status: Former Smoker     Packs/day: 0.25     Years: 33.00     Pack years: 8.25     Types: Cigarettes     Start date: 1985     Quit date: 2020     Years since quittin.2    Smokeless tobacco: Former User     Types: Snuff     Quit date: 1995   Substance and Sexual Activity    Alcohol use: No     Alcohol/week: 0.0 standard drinks    Drug use: Yes     Types: Marijuana    Sexual activity: Not Currently   Lifestyle    Physical activity     Days per week: None     Minutes per session: None    Stress: None   Relationships    Social connections     Talks on phone: None     Gets together: None     Attends Spiritism service: None     Active member of club or organization: None     Attends meetings of clubs or organizations: None     Relationship status: None    Intimate partner violence     Fear of current or ex partner: None     Emotionally abused: None Physically abused: None     Forced sexual activity: None   Other Topics Concern    None   Social History Narrative    Middle of possible separation 6/22/2016            Current Outpatient Medications   Medication Sig Dispense Refill    amitriptyline (ELAVIL) 25 MG tablet Take 1 po qhs x 2 days then 2 po qhs 60 tablet 3    nystatin (MYCOSTATIN) 230448 UNIT/GM powder Apply 1-2 times daily in the skin folds. 56.7 g 3    clobetasol (TEMOVATE) 0.05 % ointment Apply topically 2 times daily for psoriasis 1 Tube 3    oxyCODONE HCl (OXY-IR) 10 MG immediate release tablet Take 1 tablet by mouth every 8 hours as needed for Pain for up to 30 days. 90 tablet 0    varenicline (CHANTIX CONTINUING MONTH GUALBERTO) 1 MG tablet Take 1 tablet by mouth 2 times daily 60 tablet 1    cephALEXin (KEFLEX) 500 MG capsule Take 1 capsule by mouth 4 times daily 40 capsule 0    mupirocin (BACTROBAN) 2 % ointment Apply topically 2 times daily on the affected area for 7-10 days. OK to substitute to cream 30 g 2    ketoconazole (NIZORAL) 2 % cream Apply twice a day for yeast infection in the skin folds, for 4 weeks 1 Tube 3    insulin regular human (HUMULIN R) 500 UNIT/ML concentrated injection vial Patient using 0.52ml with breakfast, 0.52ml with lunch and 0.45ml with dinner. 60 mL 3    albuterol (PROVENTIL) (2.5 MG/3ML) 0.083% nebulizer solution Take 3 mLs by nebulization every 6 hours as needed for Wheezing or Shortness of Breath 120 vial 3    pantoprazole (PROTONIX) 40 MG tablet Take 1 tablet by mouth every morning (before breakfast) 90 tablet 1    Ferrous Sulfate (IRON) 325 (65 Fe) MG TABS Take 1 tablet by mouth daily 90 tablet 3    metoprolol tartrate (LOPRESSOR) 50 MG tablet Take 1 tablet by mouth 2 times daily 180 tablet 3    magnesium oxide (MAG-OX) 400 (240 Mg) MG tablet Take 1 tablet by mouth daily 90 tablet 3    lisinopril (PRINIVIL;ZESTRIL) 5 MG tablet Take 1 tablet by mouth daily . Discontinued Lisinopril  10 mg 90 tablet 3  fluticasone-salmeterol (ADVAIR HFA) 230-21 MCG/ACT inhaler Inhale 2 puffs into the lungs 2 times daily 12 g 11    tiotropium (SPIRIVA RESPIMAT) 2.5 MCG/ACT AERS inhaler Inhale 2 puffs into the lungs daily 12 g 11    venlafaxine (EFFEXOR XR) 75 MG extended release capsule Take 1 capsule by mouth daily Take with food 90 capsule 3    naloxone 4 MG/0.1ML LIQD nasal spray 1 spray by Nasal route as needed for Opioid Reversal Due to COPD and sleep apnea, this is a big recommendation for you 1 each 5    blood glucose monitor strips Test 3 times a day & as needed for symptoms of irregular blood glucose. Dispense sufficient amount for indicated testing frequency plus additional to accommodate PRN testing needs. One touch Ultra blue 300 strip 0    Lancets MISC Use to check blood sugar three times daily along with when necessary due to symptoms. 300 each 2    Insulin Syringe-Needle U-100 31G X 5/16\" 1 ML MISC Use to subcutaneously inject insulin three times daily 300 each 2    neomycin-polymyxin-hydrocortisone (CORTISPORIN) 3.5-85084-2 otic suspension Place 3 drops into both ears 4 times daily OK to substitute. For 10 days 1 Bottle 0    vitamin D3 (CHOLECALCIFEROL) 25 MCG (1000 UT) TABS tablet Take 1 tablet by mouth daily 90 tablet 1    clopidogrel (PLAVIX) 75 MG tablet TAKE ONE TABLET BY MOUTH DAILY 90 tablet 3    vitamin B-12 (CYANOCOBALAMIN) 500 MCG tablet Take 1 tablet by mouth daily 90 tablet 3    tiZANidine (ZANAFLEX) 4 MG tablet TAKE ONE TABLET BY MOUTH EVERY 8 HOURS AS NEEDED FOR BACK PAIN 90 tablet 5    escitalopram (LEXAPRO) 10 MG tablet Take 1 tablet by mouth daily 90 tablet 3    pravastatin (PRAVACHOL) 40 MG tablet Take 1 tablet by mouth nightly 90 tablet 3    Oxygen Tubing MISC by Does not apply route DX COPD.  chronic respiratory failure 1 each 0    spironolactone (ALDACTONE) 50 MG tablet Take 1 tablet by mouth daily 90 tablet 3    Respiratory Therapy Supplies (NEBULIZER/TUBING/MOUTHPIECE) KIT Dx COPD needs nebulizer supplies 1 kit 11    nitroGLYCERIN (NITROSTAT) 0.4 MG SL tablet Place 1 tablet under the tongue every 5 minutes as needed for Chest pain (and call 911) 25 tablet 3    docusate sodium (STOOL SOFTENER) 100 MG capsule Take 1 capsule by mouth 2 times daily 180 capsule 3    ONE TOUCH ULTRASOFT LANCETS MISC Patient to test blood sugar up to 4 times daily. 300 each 3    bumetanide (BUMEX) 1 MG tablet Take 3 tablets by mouth 2 times daily 540 tablet 3    Lancet Devices (LANCING DEVICE) MISC Provide patient with lancing device appropriate for his machine/lancing needles. 1 each 1    Lidocaine 4 % LOTN Apply topically      Spacer/Aero Chamber Mouthpiece MISC 1 each by Does not apply route once as needed (to be used with his inhalers) 1 each 0    metolazone (ZAROXOLYN) 2.5 MG tablet Take 2.5 mg by mouth three times a week MWF      PROAIR  (90 Base) MCG/ACT inhaler INHALE TWO PUFFS BY MOUTH EVERY 6 HOURS AS NEEDED FOR WHEEZING OR SHORTNESS OF BREATH 1 Inhaler 4    Handicap Placard [facility] by Does not apply route Can't walk greater than 200 feet. Expires in 5 years. 1 each 0    fluticasone (CUTIVATE) 0.05 % cream Apply topically 2 times daily       fluticasone (FLONASE) 50 MCG/ACT nasal spray 2 sprays by Nasal route daily (Patient taking differently: 2 sprays by Nasal route daily as needed (sinus symptoms) ) 1 Bottle 3    Melatonin 10 MG TABS Take 10 mg by mouth nightly as needed (insomnia) 90 tablet 1    aspirin 81 MG EC tablet Take 81 mg by mouth daily. No current facility-administered medications for this visit. Allergies   Allergen Reactions    Levofloxacin Anaphylaxis     Patient reports needing epinephrine \"about 5 or 6 months ago\" for anaphylaxis (itching, hives, SOB/swelling) after receiving Levofloxacin.   Previous report from 08/20/2012: Nausea/Vomiting    Lorazepam      Falls      Nsaids      CHF&CKD    Prozac [Fluoxetine Hcl] Other (See Comments)     Pt started with seizures after started taking.  Vancomycin Other (See Comments)     Itching, SOB, emesis upon infusion in ED 6/12/2019. Patient states he has had vancomycin \"a number of times\" before without issue. couldn't breath and talk, throat closed       Review of Systems   Constitutional: Positive for unexpected weight change. HENT: Positive for ear pain, hearing loss and tinnitus. Eyes: Positive for visual disturbance. Respiratory: Positive for cough and shortness of breath. Cardiovascular: Positive for chest pain and leg swelling. Gastrointestinal: Positive for diarrhea. Endocrine: Negative. Genitourinary: Positive for urgency. Musculoskeletal: Positive for arthralgias, back pain, gait problem and myalgias. Skin: Negative. Neurological: Positive for dizziness, weakness and numbness. Hematological: Negative. Psychiatric/Behavioral: Positive for dysphoric mood. The patient is nervous/anxious. Objective:   Physical Exam  Vitals:    02/05/21 0844   BP: 80/60   Pulse: 62   Temp: 97.2 °F (36.2 °C)   SpO2: 99%     weight: (!) 424 lb (192.3 kg)    Neurological Examination  Constitutional .General exam well groomed   Head/Ears /Nose/Throat: external ear . Normal exam  Neck and thyroid . Normal size. No bruits  Respiratory . Breathsounds clear bilaterally  Cardiovascular: Auscultation of heart with regular rate and rhythm  Musculoskeletal. Muscle bulk and tone normal                                                           Muscle strength 5/5 strength throughout                                                                                No dysmetria or dysdiadokinesis  No tremor   Normal fine motor  Gait normal   Orientation Alert and oriented x 3   Attention and concentration normal  Short term memory normal  Language process and speech normal . No aphasia   Cranial nerve 2 Able to detect only light right eye  acuety and visual fields  Cranial nerve 3, 4 and 6 . Extraocular muscles are intact . Pupils are equal and reactive   Cranial nerve 5 . Normal strength of masseter and temporalis . Intact corneal reflex. Normal facial sensation  Cranial nerve 7 normal exam   Cranial nerve 8. Bilateral hearing loss  Cranial nerve 9 and 10. Symmetric palate elevation   Cranial nerve 11 , 5 out of 5 strength   Cranial Nerve 12 midline tongue . No atrophy  Sensation . Decreased pinprick and light touch distal lower extremities in stocking distributaion  Deep Tendon Reflexes hypoactive with absent ankle jerks   Plantar response flexor bilaterally    Assessment:       Diagnosis Orders   1. Chronic low back pain with sciatica, sciatica laterality unspecified, unspecified back pain laterality     2. Chronic daily headache     3. Diabetic polyneuropathy associated with diabetes mellitus due to underlying condition ([facility]     4. Seizures ([facility]     Will place him on elavil as headache prophylactic and neuropathic pain .  There have been no further seizure eipsodes       Plan:      As above         Lisandro Grove MD

## 2021-02-08 ENCOUNTER — TELEPHONE (OUTPATIENT)
Dept: PHARMACY | Age: 56
End: 2021-02-08

## 2021-02-09 ENCOUNTER — HOSPITAL ENCOUNTER (OUTPATIENT)
Dept: PHARMACY | Age: 56
Setting detail: THERAPIES SERIES
Discharge: HOME | End: 2021-02-09
Payer: COMMERCIAL

## 2021-02-09 VITALS — TEMPERATURE: 96.6 F

## 2021-02-09 PROCEDURE — 99212 OFFICE O/P EST SF 10 MIN: CPT

## 2021-02-09 NOTE — PROGRESS NOTES
Diabetic Medication Management   [facility] [address]    [address]. [place], [address] [facility]  Phone: [phone]  Fax: [phone]    NAME: Aury Arnold. MEDICAL RECORD NUMBER:  [id number]  AGE: 63 y.o. GENDER: male  : 1965  EPISODE DATE:  2021       Mr. Justin Noel was referred to [facility] Medication Management Services by Dr. Michelle Franklin, Special instructions include: titrate all medications (as defined in clinic's policy and procedure)    Patient seen in office. Goals per referral:   Fasting blood glucose: < 130  Peak postprandial glucose: < 180  A1C: < 7    Other goals:  Blood pressure goal: 130/80  Weight loss goal (~10%): Target weight  410 to be reached by date: Mar 2021 (3-6 months)  Physical Activity goal:    10 minutes three times per week to be reached by date: Mar 2021 (3-6 months)  Smoking Cessation   Quit Date: Stopped 20. Currently using Chantix  Cholesterol at target:   Date: Yes LDL 82  HDL 33 Trig 180 (20)  Annual eye exam:    Date: Cancelled due to illness - will call to reschedule  Comprehensive annual foot exam:   Date: No due to covid  Annual urine Albumin and serum creatinine:   Date:  microalbumin <12 mg/L (21), SrCr 1.83 mg/dL (21)        Subjective   Mr. Justin Noel is a 63 y.o. male here for the Diabetes Service for self-management education, medication review including over the counter medications and herbal products, overall well-being assessment, transition of care and any needed adjustments with updates and recommendations communicated to the referring physician. Patient's name and  verified. Patient Findings:   Patient saw neurologist on 21. Patient having episodes of confusion/disorientation he states. Dr. Matt Heredia is looking into alternative places for him to get an MRI or further testing. Patient started amitriptyline at 25mg once a day for 2 days and now 50mg once a day at bedtime.   States since taking has not had any headaches since starting and states neuropathic pain is significantly reduced.  has not been driving any more. Patient states finished antibiotic (Cephalexin/doxycycline) this morning. Infection has improved significantly per patient but never goes away. Encouraged him to keep an eye on it and inform physician if starts to get worse in any way. Still taking Chantix. Patient has not smoked in over two months. Patient has no desire to smoke. Discussed patient can continue Chantix for about 12 weeks after smoking cessation. Patient  has a refill at the pharmacy and plans to continue thru that refill. States when he does get into a room with multiple people smoking he does get urge to reach for cig out of habit but does not really want a cigarette. Patient  has been doing more crossword puzzles. Forgot to bring in inhalers but since our last conversation he has been more aware and using better. Has not been doing two quick puffs. Has been inhaling slower and holding breath after each puff. Patient states he feels it is working better since using it more correctly. Patient will still bring in inhalers at next visit. No further falls per patient. Comes in today in a wheelchair. Upon med review patient states he forgot and has not been taking metolazone.  ran out and forgot to get refill.  has been taking bumex mostly once a day. When takes twice a day for a couple days he feels like he cant get enough to drink. Encouraged patient to watch weight on daily basis. If weight goes up by 3 or more lbs in one day to take bumex twice a day. Patient also brings in bottle with faded label he states Dr Aldo Josue put him on in Nov 2020 but is not able to get refilled due to not able to read label. Indicated to patient that label states fenofibrate.     Patient's blood pressure noted to be 150/98 at PCP appt on 1/27/21 in Epic but patient indicates it was actually low when they first took it then went up later in visit. No notation of low BP in Epic. Encouraged patient to make appt with cardiologist.  Patient sees Dr. Kimberlyn Booker but states he has not seen him in some time. Patient will call and make appt. Not taking B12 for couple months as states he could no longer get it filled on insurance and had to pay for OTC. This was frustrating for patient and therefore has not been taking it. Last B12 level in Sept 2020 was within range but patient indicates he was taking it then. Encouraged him to discuss with PCP and that he might need level rechecked since he has been off. Trying to cut out pop even thou he was drinking diet but he was having 12 a day. Patient also avoiding juices. Patient has been limiting starches. Eating more vegetables and salads with grilled chicken. Needs appt with [facility] and foot and eye exam.  Encouraged patient to make appts. Asked patient about willingness to get covid vaccine. Patient has not reservations about getting vaccinated. Wants to protect his family. Will get covid vaccine when made available to him. Patient has been taking insulin dose as instructed at 0.52ml, 0.52ml, 0.45ml (breakfast, lunch and dinner). Patient has not had any further hypoglycemic episodes and overall blood sugars have been much more controlled (see below). Patient has enough diabetes supplies and insulin. Will call if needs any refills.      []  Missed doses   []  Emergency Room Visit    [x]  Medication changes  []  Hospitalization   [x]  Diet changes   [x]  Acute illness   []  Activity changes      Symptoms of hypoglycemia    [x]  None    []  Shakiness    []  Lightheadedness or dizziness   []  Confusion      []  Sweating   []  Other        Symptoms of hyperglycemia -.    [x]  None   []  Frequent urination    []  Increased thirst   []  Other    Medication adverse reactions (none due to diabetic medicaitons)   [x]  None   []  Diarrhea / (gastroesophageal reflux disease)     Head injury     Hearing loss in right ear     pencil pierced ear as a child    Hepatic steatosis 12/3/2015    History of general anesthesia complication     has woke up during surgery under anesthesia    History of rib fracture 12/3/2015    Chronic     Hyperlipidemia     Hypersomnia     Hypertension     Insomnia     Intolerance of continuous positive airway pressure (CPAP) ventilation 7/20/2017    Mastoiditis of left side     Mixed conductive and sensorineural hearing loss of both ears 1/10/2017    Per ENT    Mixed type COPD (chronic obstructive pulmonary disease) ([address]     Moderate recurrent major depression ([address] 10/2/2016    Morbid obesity with BMI of 45.0-49.9, adult ([address] 6/16/2015    Neuropathy     Open wound of groin 12/19/2018    BARBY on CPAP     BARBY treated with BiPAP     Osteoarthritis     Otitis externa of left ear     Pancreatitis chronic     Persistent depressive disorder 11/19/2019    Renal insufficiency     proteinuria    S/P cardiac cath 04/23/2018    Severe depression ([address] 9/25/2013    Spinal stenosis of lumbar region without neurogenic claudication 1/6/2016    MRI lumbar 12/30/15 L3-L4: There is broad-based bulging disc which appears protruding left laterally causing flattening of the ventral thecal sac. In addition, there is facet arthropathy with mild hypertrophic changes.  There is borderline central canal stenosis with  evidence of moderate left neural foraminal narrowing and mild right neural foramina narrowing.   L4-L5: There is broad-based protrud    Syncope 4/28/2017    Tinnitus of both ears 1/10/2017    Per ENT    Type 2 diabetes mellitus with stage 3 chronic kidney disease, with long-term current use of insulin ([address] 12/26/2016    due to underlying condition with hyperosmolarity without coma    Type II or unspecified type diabetes mellitus without mention of complication, not stated as uncontrolled     uncontrolled    strips Test 3 times a day & as needed for symptoms of irregular blood glucose. Dispense sufficient amount for indicated testing frequency plus additional to accommodate PRN testing needs. One touch Ultra blue 9/30/20   Fronie Habermann, MD   Lancets MISC Use to check blood sugar three times daily along with when necessary due to symptoms. 9/30/20   Fronie Habermann, MD   Insulin Syringe-Needle U-100 31G X 5/16\" 1 ML MISC Use to subcutaneously inject insulin three times daily 9/30/20   Fronie Habermann, MD   neomycin-polymyxin-hydrocortisone (CORTISPORIN) 3.5-42498-6 otic suspension Place 3 drops into both ears 4 times daily OK to substitute. For 10 days 9/16/20   Juice Portillo MD   vitamin D3 (CHOLECALCIFEROL) 25 MCG (1000 UT) TABS tablet Take 1 tablet by mouth daily 8/26/20   Juice Portillo MD   clopidogrel (PLAVIX) 75 MG tablet TAKE ONE TABLET BY MOUTH DAILY 7/29/20   Juice Portillo MD   vitamin B-12 (CYANOCOBALAMIN) 500 MCG tablet Take 1 tablet by mouth daily 7/13/20   Juice Portillo MD   tiZANidine (ZANAFLEX) 4 MG tablet TAKE ONE TABLET BY MOUTH EVERY 8 HOURS AS NEEDED FOR BACK PAIN 7/8/20   Juice Portillo MD   escitalopram (LEXAPRO) 10 MG tablet Take 1 tablet by mouth daily 6/17/20   Juice Portillo MD   pravastatin (PRAVACHOL) 40 MG tablet Take 1 tablet by mouth nightly 5/20/20   Juice Portillo MD   Oxygen Tubing MISC by Does not apply route DX COPD.  chronic respiratory failure 3/26/20   Juice Portillo MD   spironolactone (ALDACTONE) 50 MG tablet Take 1 tablet by mouth daily 3/11/20   Juice Portillo MD   Respiratory Therapy Supplies (NEBULIZER/TUBING/MOUTHPIECE) KIT Dx COPD needs nebulizer supplies 2/20/20   Juice Portillo MD   nitroGLYCERIN (NITROSTAT) 0.4 MG SL tablet Place 1 tablet under the tongue every 5 minutes as needed for Chest pain (and call 911) 2/13/20   Juice Portillo MD   docusate sodium (STOOL SOFTENER) 100 MG capsule Take 1 capsule by mouth 2 times daily 1/15/20   Albaro Moreau MD   ONE TOUCH ULTRASOFT LANCETS MISC Patient to test blood sugar up to 4 times daily. 11/7/19   Katey Hurley MD   bumetanide (BUMEX) 1 MG tablet Take 3 tablets by mouth 2 times daily 10/10/19   Albaro Moreau MD   Lancet Devices (LANCING DEVICE) MISC Provide patient with lancing device appropriate for his machine/lancing needles. 6/4/19   Albaro Moreau MD   Lidocaine 4 % LOTN Apply topically    Historical Provider, MD   Spacer/Aero Chamber Mouthpiece MISC 1 each by Does not apply route once as needed (to be used with his inhalers) 4/15/19 2/5/21  Albaro Moreau MD   metolazone (ZAROXOLYN) 2.5 MG tablet Take 2.5 mg by mouth three times a week MWF    Historical Provider, MD   PROAIR  (90 Base) MCG/ACT inhaler INHALE TWO PUFFS BY MOUTH EVERY 6 HOURS AS NEEDED FOR WHEEZING OR SHORTNESS OF BREATH 3/27/19   Cezar Knox MD   Handicap Placard MISC by Does not apply route Can't walk greater than 200 feet. Expires in 5 years. 2/13/19   Paige Bryant MD   fluticasone (CUTIVATE) 0.05 % cream Apply topically 2 times daily  4/19/17   Historical Provider, MD   fluticasone (FLONASE) 50 MCG/ACT nasal spray 2 sprays by Nasal route daily  Patient taking differently: 2 sprays by Nasal route daily as needed (sinus symptoms)  1/16/17   Albaro Moreau MD   Melatonin 10 MG TABS Take 10 mg by mouth nightly as needed (insomnia) 12/23/16   Albaro Moreau MD   aspirin 81 MG EC tablet Take 81 mg by mouth daily.     Historical Provider, MD       Immunizations:   Most Recent Immunizations   Administered Date(s) Administered    DT (pediatric) 12/14/1998    Hepatitis B Adult (Engerix-B) 12/04/2019    Hepatitis B Adult (Recombivax HB) 12/04/2019    Influenza Virus Vaccine 10/12/2015    Influenza, Quadv, IM, PF (6 mo and older Fluzone, Flulaval, Fluarix, and 3 yrs and older Afluria) 10/09/2020    Pneumococcal Polysaccharide ([id number]) 05/23/2013    Tdap (Boostrix, Adacel) 09/12/2018       Home Blood Glucose Results:   Patient brought in glucose log. Fasting: (newest to oldest)   99,101,100,98,100,105,102,100,105,101,103,100,104,102,105,111,109,110,118      Before lunch (newest to oldest)  623,597,613,326,615,009,369,301,818,347,276,484,164,426,911,[id number],832      Before dinner (newest to oldest)  448,068,778,955,111,859,158,558,735,542,537,519,642,197,[phone]      Blood sugars have been much more controlled and stable since last appt. Congratulated patient and encouraged him to continue watching his diet and taking medication as instructed. Assessment     A1c at goal:No:  8.7 (1/20/21) (increasing)  Blood Pressure at goal: elevated at last PCP visit  Weight at goal: No:    Physical activity: No  Physical activity at goal: No  Patient encouraged but unable to due SOB. Smoking Cessation: Yes    Cholesterol at target:  Yes  Annual eye exam completed: No - needs to scheduled  Comprehensive Foot Exam Completed: No - TBD  Annual urine albumin and serum creatinine: Yes    Statin: Yes    Appropriate?: Yes  Changes made:     ACE/ARB: Yes  Appropriate?: Yes  Changes made:     Aspirin: Yes  Appropriate?: Yes  Changes made:     Eating patterns:    [x]  My plate    []  Mediterranean diet   []  Low sodium   []  DASH diet   [x]  Portion control   [x]  Reduced calorie    []  Fast food / Restaurant  []  High glycemic index foods   []  Sugary beverages   []  Other     Comment: see above    Current Medications Affecting Diabetes:  Humulin R 500    Compliant: Yes  Barriers to medication therapy: anxiety / depression    Medications attempted in the past:  Metformin - cardiologist took him off but patient unsure why  Januvia - PCP took him off but patient unsure why    Recent exacerbations / new problems:  Infection / fall - ankle pain    Last office dictation reviewed: yes        type 2 DM under worsening control as evidenced by A1C 8.7 on 1/20/21    Plan Counseling at today's visit:   -Continued monitoring of blood glucose with documentation on log three times a day. -Continued dose of insulin:0.52ml with breakfast, 0.52ml with lunch 0.45ml with dinner. Patient encouraged to call with any blood glucose below 70.  -Patient has enough diabetic supplies and medications other than Humulin R 500 sent to pharmacy.      -Continue Chantix and abstinence from smoking.     -Call podiatrist for appt (Dr. Rafael Hidalgo) and eye appt. -Call Dr. Karsten Lozano and Dr. Tanika Henriquez for appts. Try to watch diet, take bumex as instructed when weight increased. Call for refills on fenofibrate and metolazone. Physician Follow-up:   Dr Karsten Lozano    Medication Management Follow-up:   Diabetes Service   6 weeks in person    Electronically signed by Mitzi Rizvi RPH on 2/9/2021 at 7:50 AM     CLINICAL PHARMACY CONSULT: MED RECONCILIATION/REVIEW [facility]  22. Tracking Only    PHSO: No  Total # of Interventions Recommended: 3  - Updated Order #: 3 Updated Order Reason(s):  Other  Total Interventions Accepted: 3  Time Spent (min): 45    Yesenia Rizvi, PharmD  [address]

## 2021-02-23 ENCOUNTER — HOSPITAL ENCOUNTER (OUTPATIENT)
Age: 56
Setting detail: SPECIMEN
Discharge: HOME | End: 2021-02-23
Payer: COMMERCIAL

## 2021-02-23 ENCOUNTER — PATIENT MESSAGE (OUTPATIENT)
Dept: FAMILY MEDICINE CLINIC | Age: 56
End: 2021-02-23

## 2021-02-23 ENCOUNTER — OFFICE VISIT (OUTPATIENT)
Dept: FAMILY MEDICINE CLINIC | Age: 56
End: 2021-02-23
Payer: COMMERCIAL

## 2021-02-23 VITALS
TEMPERATURE: 97.1 F | OXYGEN SATURATION: 97 % | SYSTOLIC BLOOD PRESSURE: 130 MMHG | BODY MASS INDEX: 42.66 KG/M2 | DIASTOLIC BLOOD PRESSURE: 80 MMHG | HEART RATE: 106 BPM | WEIGHT: 315 LBS | HEIGHT: 72 IN

## 2021-02-23 DIAGNOSIS — N18.30 BENIGN HYPERTENSIVE HEART AND CKD, STAGE 3 (GFR 30-59), W CHF (HCC): ICD-10-CM

## 2021-02-23 DIAGNOSIS — H91.93 BILATERAL HEARING LOSS, UNSPECIFIED HEARING LOSS TYPE: ICD-10-CM

## 2021-02-23 DIAGNOSIS — Z71.89 ACP (ADVANCE CARE PLANNING): ICD-10-CM

## 2021-02-23 DIAGNOSIS — K59.01 SLOW TRANSIT CONSTIPATION: ICD-10-CM

## 2021-02-23 DIAGNOSIS — G89.29 CHRONIC MIDLINE LOW BACK PAIN WITH LEFT-SIDED SCIATICA: ICD-10-CM

## 2021-02-23 DIAGNOSIS — M48.061 SPINAL STENOSIS OF LUMBAR REGION WITHOUT NEUROGENIC CLAUDICATION: ICD-10-CM

## 2021-02-23 DIAGNOSIS — D64.9 ANEMIA, UNSPECIFIED TYPE: ICD-10-CM

## 2021-02-23 DIAGNOSIS — E55.9 VITAMIN D DEFICIENCY: ICD-10-CM

## 2021-02-23 DIAGNOSIS — I50.32 CHRONIC DIASTOLIC CONGESTIVE HEART FAILURE (HCC): ICD-10-CM

## 2021-02-23 DIAGNOSIS — Z00.00 ROUTINE GENERAL MEDICAL EXAMINATION AT A HEALTH CARE FACILITY: Primary | ICD-10-CM

## 2021-02-23 DIAGNOSIS — M51.36 DDD (DEGENERATIVE DISC DISEASE), LUMBAR: ICD-10-CM

## 2021-02-23 DIAGNOSIS — M54.42 CHRONIC MIDLINE LOW BACK PAIN WITH LEFT-SIDED SCIATICA: ICD-10-CM

## 2021-02-23 DIAGNOSIS — I13.0 BENIGN HYPERTENSIVE HEART AND CKD, STAGE 3 (GFR 30-59), W CHF (HCC): ICD-10-CM

## 2021-02-23 DIAGNOSIS — Z53.20 COLON CANCER SCREENING DECLINED: ICD-10-CM

## 2021-02-23 DIAGNOSIS — L85.3 DRY SKIN DERMATITIS: ICD-10-CM

## 2021-02-23 DIAGNOSIS — Z12.11 COLON CANCER SCREENING: ICD-10-CM

## 2021-02-23 DIAGNOSIS — M96.1 POSTLAMINECTOMY SYNDROME OF LUMBAR REGION: ICD-10-CM

## 2021-02-23 DIAGNOSIS — T14.8XXA EXCORIATION: ICD-10-CM

## 2021-02-23 LAB
ABSOLUTE EOS #: 0.3 K/UL (ref 0–0.4)
ABSOLUTE IMMATURE GRANULOCYTE: ABNORMAL K/UL (ref 0–0.3)
ABSOLUTE LYMPH #: 1.2 K/UL (ref 1–4.8)
ABSOLUTE MONO #: 0.6 K/UL (ref 0.1–1.3)
ALBUMIN SERPL-MCNC: 4 G/DL (ref 3.5–5.2)
ALBUMIN/GLOBULIN RATIO: ABNORMAL (ref 1–2.5)
ALP BLD-CCNC: 88 U/L (ref 40–129)
ALT SERPL-CCNC: 18 U/L (ref 5–41)
ANION GAP SERPL CALCULATED.3IONS-SCNC: 12 MMOL/L (ref 9–17)
AST SERPL-CCNC: 17 U/L
BASOPHILS # BLD: 1 % (ref 0–2)
BASOPHILS ABSOLUTE: 0.1 K/UL (ref 0–0.2)
BILIRUB SERPL-MCNC: 0.24 MG/DL (ref 0.3–1.2)
BNP INTERPRETATION: NORMAL
BUN BLDV-MCNC: 44 MG/DL (ref 6–20)
BUN/CREAT BLD: ABNORMAL (ref 9–20)
CALCIUM SERPL-MCNC: 9.2 MG/DL (ref 8.6–10.4)
CHLORIDE BLD-SCNC: 101 MMOL/L (ref 98–107)
CO2: 25 MMOL/L (ref 20–31)
CREAT SERPL-MCNC: 1.94 MG/DL (ref 0.7–1.2)
DIFFERENTIAL TYPE: ABNORMAL
EOSINOPHILS RELATIVE PERCENT: 3 % (ref 0–4)
GFR AFRICAN AMERICAN: 44 ML/MIN
GFR NON-AFRICAN AMERICAN: 36 ML/MIN
GFR SERPL CREATININE-BSD FRML MDRD: ABNORMAL ML/MIN/{1.73_M2}
GFR SERPL CREATININE-BSD FRML MDRD: ABNORMAL ML/MIN/{1.73_M2}
GLUCOSE BLD-MCNC: 339 MG/DL (ref 70–99)
HCT VFR BLD CALC: 35 % (ref 41–53)
HEMOGLOBIN: 11.4 G/DL (ref 13.5–17.5)
IMMATURE GRANULOCYTES: ABNORMAL %
LYMPHOCYTES # BLD: 13 % (ref 24–44)
MAGNESIUM: 1.8 MG/DL (ref 1.6–2.6)
MCH RBC QN AUTO: 27.7 PG (ref 26–34)
MCHC RBC AUTO-ENTMCNC: 32.5 G/DL (ref 31–37)
MCV RBC AUTO: 85.3 FL (ref 80–100)
MONOCYTES # BLD: 7 % (ref 1–7)
NRBC AUTOMATED: ABNORMAL PER 100 WBC
PDW BLD-RTO: 15.8 % (ref 11.5–14.9)
PHOSPHORUS: 3.8 MG/DL (ref 2.5–4.5)
PLATELET # BLD: 204 K/UL (ref 150–450)
PLATELET ESTIMATE: ABNORMAL
PMV BLD AUTO: 9.1 FL (ref 6–12)
POTASSIUM SERPL-SCNC: 4.6 MMOL/L (ref 3.7–5.3)
PRO-BNP: 135 PG/ML
RBC # BLD: 4.1 M/UL (ref 4.5–5.9)
RBC # BLD: ABNORMAL 10*6/UL
SEG NEUTROPHILS: 76 % (ref 36–66)
SEGMENTED NEUTROPHILS ABSOLUTE COUNT: 7.5 K/UL (ref 1.3–9.1)
SODIUM BLD-SCNC: 138 MMOL/L (ref 135–144)
TOTAL PROTEIN: 7 G/DL (ref 6.4–8.3)
WBC # BLD: 9.7 K/UL (ref 3.5–11)
WBC # BLD: ABNORMAL 10*3/UL

## 2021-02-23 PROCEDURE — 36415 COLL VENOUS BLD VENIPUNCTURE: CPT

## 2021-02-23 PROCEDURE — 85025 COMPLETE CBC W/AUTO DIFF WBC: CPT

## 2021-02-23 PROCEDURE — G8482 FLU IMMUNIZE ORDER/ADMIN: HCPCS | Performed by: FAMILY MEDICINE

## 2021-02-23 PROCEDURE — 83735 ASSAY OF MAGNESIUM: CPT

## 2021-02-23 PROCEDURE — 84100 ASSAY OF PHOSPHORUS: CPT

## 2021-02-23 PROCEDURE — G0439 PPPS, SUBSEQ VISIT: HCPCS | Performed by: FAMILY MEDICINE

## 2021-02-23 PROCEDURE — 83880 ASSAY OF NATRIURETIC PEPTIDE: CPT

## 2021-02-23 PROCEDURE — 3017F COLORECTAL CA SCREEN DOC REV: CPT | Performed by: FAMILY MEDICINE

## 2021-02-23 PROCEDURE — 80053 COMPREHEN METABOLIC PANEL: CPT

## 2021-02-23 RX ORDER — OXYCODONE HYDROCHLORIDE 10 MG/1
10 TABLET ORAL EVERY 8 HOURS PRN
Qty: 90 TABLET | Refills: 0 | Status: SHIPPED | OUTPATIENT
Start: 2021-02-23 | End: 2021-03-23 | Stop reason: SDUPTHER

## 2021-02-23 RX ORDER — AMMONIUM LACTATE 12 G/100G
LOTION TOPICAL
Qty: 1 BOTTLE | Refills: 2 | Status: ON HOLD
Start: 2021-02-23 | End: 2021-09-14 | Stop reason: HOSPADM

## 2021-02-23 ASSESSMENT — LIFESTYLE VARIABLES: HOW OFTEN DO YOU HAVE A DRINK CONTAINING ALCOHOL: 0

## 2021-02-23 ASSESSMENT — PATIENT HEALTH QUESTIONNAIRE - PHQ9
SUM OF ALL RESPONSES TO PHQ9 QUESTIONS 1 & 2: 0
SUM OF ALL RESPONSES TO PHQ QUESTIONS 1-9: 0
2. FEELING DOWN, DEPRESSED OR HOPELESS: 0
1. LITTLE INTEREST OR PLEASURE IN DOING THINGS: 0

## 2021-02-23 NOTE — PROGRESS NOTES
Medicare Annual Wellness Visit  Name: Demetrius Allen XQSGSV Date: 2021   MRN: [id number] Sex: Male   Age: 63 y.o. Ethnicity: Non-/Non    : 1965 Race: [employer]. is here for Medicare AWV (did not do eye exam right away he was too out of breath )    Screenings for behavioral, psychosocial and functional/safety risks, and cognitive dysfunction are all negative except as indicated below. These results, as well as other patient data from the [address] form, are documented in Flowsheets linked to this Encounter. Allergies   Allergen Reactions    Levofloxacin Anaphylaxis     Patient reports needing epinephrine \"about 5 or 6 months ago\" for anaphylaxis (itching, hives, SOB/swelling) after receiving Levofloxacin. Previous report from 2012: Nausea/Vomiting    Lorazepam      Falls      Nsaids      CHF&CKD    Prozac [Fluoxetine Hcl] Other (See Comments)     Pt started with seizures after started taking.  Vancomycin Other (See Comments)     Itching, SOB, emesis upon infusion in ED 2019. Patient states he has had vancomycin \"a number of times\" before without issue. couldn't breath and talk, throat closed       Prior to Visit Medications    Medication Sig Taking? Authorizing Provider   amitriptyline (ELAVIL) 25 MG tablet Take 1 po qhs x 2 days then 2 po qhs Yes Aura Alston MD   nystatin (MYCOSTATIN) 857905 UNIT/GM powder Apply 1-2 times daily in the skin folds. Yes Ruchi Casanova MD   clobetasol (TEMOVATE) 0.05 % ointment Apply topically 2 times daily for psoriasis Yes Ruchi Casanova MD   varenicline (CHANTIX CONTINUING MONTH ) 1 MG tablet Take 1 tablet by mouth 2 times daily Yes Ruchi Casanova MD   mupirocin (BACTROBAN) 2 % ointment Apply topically 2 times daily on the affected area for 7-10 days.  OK to substitute to cream Yes Ruchi Casanova MD ketoconazole (NIZORAL) 2 % cream Apply twice a day for yeast infection in the skin folds, for 4 weeks Yes Jcarlos Wood MD   insulin regular human (HUMULIN R) 500 UNIT/ML concentrated injection vial Patient using 0.52ml with breakfast, 0.52ml with lunch and 0.45ml with dinner. Yes Stephon Chaves MD   albuterol (PROVENTIL) (2.5 MG/3ML) 0.083% nebulizer solution Take 3 mLs by nebulization every 6 hours as needed for Wheezing or Shortness of Breath Yes Jcarlos Wood MD   pantoprazole (PROTONIX) 40 MG tablet Take 1 tablet by mouth every morning (before breakfast) Yes Jcarlos Wood MD   Ferrous Sulfate (IRON) 325 (65 Fe) MG TABS Take 1 tablet by mouth daily Yes Jcarlos Wood MD   metoprolol tartrate (LOPRESSOR) 50 MG tablet Take 1 tablet by mouth 2 times daily Yes Jcarlos Wood MD   magnesium oxide (MAG-OX) 400 (240 Mg) MG tablet Take 1 tablet by mouth daily Yes Jcarlos Wood MD   lisinopril (PRINIVIL;ZESTRIL) 5 MG tablet Take 1 tablet by mouth daily . Discontinued Lisinopril  10 mg Yes Jcarlos Wood MD   fluticasone-salmeterol (ADVAIR HFA) 230-21 MCG/ACT inhaler Inhale 2 puffs into the lungs 2 times daily Sunita Wood MD   tiotropium (SPIRIVA RESPIMAT) 2.5 MCG/ACT AERS inhaler Inhale 2 puffs into the lungs daily Yes Jcarlos Wood MD   venlafaxine (EFFEXOR XR) 75 MG extended release capsule Take 1 capsule by mouth daily Take with food Yes Jcarlos Wood MD   naloxone 4 MG/0.1ML LIQD nasal spray 1 spray by Nasal route as needed for Opioid Reversal Due to COPD and sleep apnea, this is a big recommendation for you Yes Jcarlos Wood MD   blood glucose monitor strips Test 3 times a day & as needed for symptoms of irregular blood glucose. Dispense sufficient amount for indicated testing frequency plus additional to accommodate PRN testing needs.  One touch Ultra blue Yes Stephon Chaves MD Lancets MISC Use to check blood sugar three times daily along with when necessary due to symptoms. Yes Jane Rowell MD   Insulin Syringe-Needle U-100 31G X 5/16\" 1 ML MISC Use to subcutaneously inject insulin three times daily Yes Jane Rowell MD   neomycin-polymyxin-hydrocortisone (CORTISPORIN) 3.5-53671-3 otic suspension Place 3 drops into both ears 4 times daily OK to substitute. For 10 days Yes Andrew Nash MD   vitamin D3 (CHOLECALCIFEROL) 25 MCG (1000 UT) TABS tablet Take 1 tablet by mouth daily Yes Andrew Nash MD   clopidogrel (PLAVIX) 75 MG tablet TAKE ONE TABLET BY MOUTH DAILY Yes Andrew Nash MD   vitamin B-12 (CYANOCOBALAMIN) 500 MCG tablet Take 1 tablet by mouth daily Yes Andrew Nash MD   tiZANidine (ZANAFLEX) 4 MG tablet TAKE ONE TABLET BY MOUTH EVERY 8 HOURS AS NEEDED FOR BACK PAIN Yes Andrew Nash MD   escitalopram (LEXAPRO) 10 MG tablet Take 1 tablet by mouth daily Yes Andrew Nash MD   pravastatin (PRAVACHOL) 40 MG tablet Take 1 tablet by mouth nightly Yes Andrew Nash MD   Oxygen Tubing MISC by Does not apply route DX COPD. chronic respiratory failure Yes Andrew Nash MD   spironolactone (ALDACTONE) 50 MG tablet Take 1 tablet by mouth daily Yes Andrew Nash MD   Respiratory Therapy Supplies (NEBULIZER/TUBING/MOUTHPIECE) KIT Dx COPD needs nebulizer supplies Yes Andrew Nash MD   nitroGLYCERIN (NITROSTAT) 0.4 MG SL tablet Place 1 tablet under the tongue every 5 minutes as needed for Chest pain (and call 911) Yes Andrew Nash MD   docusate sodium (STOOL SOFTENER) 100 MG capsule Take 1 capsule by mouth 2 times daily Yes Andrew Nash MD   ONE TOUCH ULTRASOFT LANCETS MISC Patient to test blood sugar up to 4 times daily.  Yes Jane Rowell MD   bumetanide (BUMEX) 1 MG tablet Take 3 tablets by mouth 2 times daily Yes Andrew Nash MD Lancet Devices (LANCING DEVICE) MISC Provide patient with lancing device appropriate for his machine/lancing needles. Yes Angeles Herndon MD   Lidocaine 4 % LOTN Apply topically Yes Historical Provider, MD   metolazone (ZAROXOLYN) 2.5 MG tablet Take 2.5 mg by mouth three times a week MWF Yes Historical Provider, MD   PROAIR  (90 Base) MCG/ACT inhaler INHALE TWO PUFFS BY MOUTH EVERY 6 HOURS AS NEEDED FOR WHEEZING OR SHORTNESS OF BREATH Yes Malachi Coronel MD   Handicap Placard MISC by Does not apply route Can't walk greater than 200 feet. Expires in 5 years. Yes Angeles Herndon MD   fluticasone (CUTIVATE) 0.05 % cream Apply topically 2 times daily  Yes Historical Provider, MD   fluticasone (FLONASE) 50 MCG/ACT nasal spray 2 sprays by Nasal route daily  Patient taking differently: 2 sprays by Nasal route daily as needed (sinus symptoms)  Yes Paige Bryant MD   Melatonin 10 MG TABS Take 10 mg by mouth nightly as needed (insomnia) Yes Angeles Herndon MD   aspirin 81 MG EC tablet Take 81 mg by mouth daily.  Yes Historical Provider, MD   Spacer/Aero Chamber Mouthpiece [address] 1 each by Does not apply route once as needed (to be used with his inhalers)  Angeles Herndon MD         Past Medical History:   Diagnosis Date    Acute bronchitis with chronic obstructive pulmonary disease (COPD) [facility]     Acute on chronic kidney failure ([facility] 7/20/2017    Acute on chronic respiratory failure (HCC) 10/2/2018    Adhesive capsulitis of left shoulder 3/25/2017    Anxiety 10/2/2016    Arthropathy, unspecified, other specified sites 6/13/2013    Asthma     Bilateral lower leg cellulitis 2/17/2016    Blood in stool     CAD (coronary artery disease)     Cellulitis of both lower extremities 5/25/2017    Cellulitis of leg, left 07/20/2017    CHF (congestive heart failure), NYHA class III ([facility]) 8/14/2013    Chronic back pain     Chronic bronchitis ([facility])     Chronic headaches was referred to neuro, testing scheduled    Chronic kidney disease     Chronic respiratory failure (HCC)     Chronic ulcer of left leg, with fat layer exposed ([address] 2/22/2019    Class 2 severe obesity due to excess calories with serious comorbidity and body mass index (BMI) of 35.0 to 35.9 in adult (HCC)     (BMI 35.0-39.9 without comorbidity)    COPD (chronic obstructive pulmonary disease) ([address]     COPD exacerbation ([address] 11/2/2016    Diabetic neuropathy ([address] 8/14/2013    Displacement of lumbar intervertebral disc without myelopathy 6/13/2013    Ear infection     RIGHT    Essential hypertension     Facial cellulitis 2012    Fall 3/25/2017    Former smoker     GERD (gastroesophageal reflux disease)     Head injury     Hearing loss in right ear     pencil pierced ear as a child    Hepatic steatosis 12/3/2015    History of general anesthesia complication     has woke up during surgery under anesthesia    History of rib fracture 12/3/2015    Chronic     Hyperlipidemia     Hypersomnia     Hypertension     Insomnia     Intolerance of continuous positive airway pressure (CPAP) ventilation 7/20/2017    Mastoiditis of left side     Mixed conductive and sensorineural hearing loss of both ears 1/10/2017    Per ENT    Mixed type COPD (chronic obstructive pulmonary disease) ([address]     Moderate recurrent major depression ([address] 10/2/2016    Morbid obesity with BMI of 45.0-49.9, adult ([address] 6/16/2015    Neuropathy     Open wound of groin 12/19/2018    BARBY on CPAP     BARBY treated with BiPAP     Osteoarthritis     Otitis externa of left ear     Pancreatitis chronic     Persistent depressive disorder 11/19/2019    Renal insufficiency     proteinuria    S/P cardiac cath 04/23/2018    Severe depression ([address] 9/25/2013    Spinal stenosis of lumbar region without neurogenic claudication 1/6/2016 MRI lumbar 12/30/15 L3-L4: There is broad-based bulging disc which appears protruding left laterally causing flattening of the ventral thecal sac. In addition, there is facet arthropathy with mild hypertrophic changes.  There is borderline central canal stenosis with  evidence of moderate left neural foraminal narrowing and mild right neural foramina narrowing.   L4-L5: There is broad-based protrud    Syncope 4/28/2017    Tinnitus of both ears 1/10/2017    Per ENT    Type 2 diabetes mellitus with stage 3 chronic kidney disease, with long-term current use of insulin ([address] 12/26/2016    due to underlying condition with hyperosmolarity without coma    Type II or unspecified type diabetes mellitus without mention of complication, not stated as uncontrolled     uncontrolled    Wears glasses     for reading    Wound of left leg 2/22/2019       Past Surgical History:   Procedure Laterality Date    BACK SURGERY   (x 4) 2000,.12/2011.2/2012     Dr Racheal Holloway last 2 surg    CARDIAC CATHETERIZATION  04/23/2018    no stenting    CATARACT REMOVAL WITH IMPLANT Right 11/05/2019    [facility]    CATARACT REMOVAL WITH IMPLANT Left 01/07/2020    [facility]    COLONOSCOPY  11/3/2015    hemorrhoids, poor prep    CORONARY ANGIOPLASTY WITH STENT PLACEMENT  March 2013    x 1    HAND TENDON SURGERY Left     thumb tendon repair    INTRACAPSULAR CATARACT EXTRACTION Right 11/5/2019    EYE CATARACT EMULSIFICATION IOL IMPLANT performed by Beau Bae MD at [facility] Left 1/7/2020    EYE CATARACT EMULSIFICATION IOL IMPLANT performed by Beau Bae MD at [address] ARTHROSCOPY Left     NERVE BLOCK  07-31-15    TENS unit    KS ESOPHAGOGASTRODUODENOSCOPY TRANSORAL DIAGNOSTIC N/A 7/18/2018    EGD ESOPHAGOGASTRODUODENOSCOPY performed by Rachele Montogmery MD at [facility] Bilateral 09/20/2012    Dr Hailey Kearns  UPPER GASTROINTESTINAL ENDOSCOPY  2013    normal         Family History   Problem Relation Age of Onset    Heart Disease Mother          age 64 from MI   Rosalinda Tobin High Blood Pressure Mother     Diabetes Mother     High Blood Pressure Father          age 80 from CKD and Lung Fibrosis    Kidney Disease Father     Heart Disease Sister     Heart Attack Sister     Obesity Sister     Diabetes Sister        CareTeam (Including outside providers/suppliers regularly involved in providing care):   Patient Care Team:  Sid Aguila MD as PCP - General (Family Medicine)  Sid Aguila MD as PCP - [facility]  Ritu Case MD as Consulting Physician (Endocrinology)  Dave Grande DO as Consulting Physician (Cardiology)  Dewey Soria DPM as Surgeon (Podiatry)  Destiny Hightower MD as Consulting Physician (Ophthalmology)  Baron Iman MD as Consulting Physician (Nephrology)  Isadore Hammans, MD as Consulting Physician (Pulmonology)  Edgard Salamanca MD as Surgeon (Vascular Surgery)  [address], MD as Consulting Physician (Gastroenterology)  [place] as Pharmacist (Pharmacist)  Christine Churchill MD as Consulting Physician (Pulmonology)  Shelby Lockwood MD as Consulting Physician (Urology)  Nivia Crum MD as Surgeon (Ophthalmology)  Samanta Elder MD as Consulting Physician (Neurology)  Carrillo Tsang (y [place])  Suzette Dias MD as Surgeon (Otolaryngology)      Vital signs normal limits except mild tachycardia and very morbidly obese  Wt Readings from Last 3 Encounters:   21 (!) 420 lb (190.5 kg)   21 (!) 424 lb (192.3 kg)   21 (!) 424 lb (192.3 kg)     Vitals:    21 0754   BP: 130/80   Pulse: 106   Temp: 97.1 °F (36.2 °C)   SpO2: 97%   Weight: (!) 420 lb (190.5 kg)   Height: 6' (1.829 m)     Body mass index is 56.96 kg/m². Had cataracts removed bilateral, glasses,, he is overdue for eye exam    Patient has chronic hearing loss, he says he was unsuccessful to see ENT, will place a new referral.    Has known congestive heart failure, hypertension, kidney disease stage III  He does admit he did not see cardiologist in a long time, I will give him contact info, I gave him contact information for Dr. Anna Myers Results   Component Value Date    LVEF 53 03/29/2018    Bjorn Helms Echo 08/29/2017     Patient is overdue for colon cancer screening. He declines referral.  He does have anemia which is getting slightly worsening, I explained this to him, and his wife. I explained to him why we do colon cancer screening and that he would benefit from it. He still declines. I explained to him if the blood work that he will do today still shows anemia, then he needs to see the GI specialist for colonoscopy  He is agreeable      Patient's complete Health Risk Assessment and screening values have been reviewed and are found in Flowsheets. The following problems were reviewed today and where indicated follow up appointments were made and/or referrals ordered. Positive Risk Factor Screenings with Interventions:         Substance History:  Social History     Tobacco History     Smoking Status  Former Smoker Smoking Start Date  6/22/1985 Quit date  11/1/2020 Smoking Frequency  0.25 packs/day for 33 years (8.25 pk yrs)    Smoking Tobacco Type  Cigarettes    Smokeless Tobacco Use  Former User Quit date  6/22/1995 Smokeless Tobacco Type  Snuff          Alcohol History     Alcohol Use Status  No          Drug Use     Drug Use Status  Yes Types  Marijuana          Sexual Activity     Sexually Active  Not Currently               Alcohol Screening:       A score of 8 or more is associated with harmful or hazardous drinking. A score of 13 or more in women, and 15 or more in men, is likely to indicate alcohol dependence.   Substance Abuse Interventions: · Recreational drug use:  educational materials provided, patient is not ready to change his/her recreational drug use behavior at this time, smoking THC for pain control and mood control. · I explained to him that smoking marijuana makes him so short of breath. He is not willing to stop, as he has been doing it for many years. I discussed with him that he might want to check on his own into different forms of marijuana but no smoking which will damage his lungs even more  He does use oxygen 3 l/min at nighttime and he cannot breathe well with the mask on      General Health and ACP:  General  In general, how would you say your health is?: (!) Poor  In the past 7 days, have you experienced any of the following? New or Increased Pain, New or Increased Fatigue, Loneliness, Social Isolation, Stress or Anger?: None of These  Do you get the social and emotional support that you need?: Yes  Do you have a Living Will?: (!) No  Advance Directives     Power of [facility] Will ACP-Advance Directive ACP-Power of     Not on File Not on File Not on File Not on File      General Health Risk Interventions:  · Poor self-assessment of health status: Patient does have multiple comorbidities   · He recently did see neurologist and I praised him for this, he is not driving anymore, his wife is driving him.   · I placed referral for ENT, advised to see ophthalmologist, advised to see cardiologist, advised to get colonoscopy done, referral ordered, blood work ordered, he says he will do it today  ·   · No Living Will: Patient declines ACP discussion/assistance    Health Habits/Nutrition:  Health Habits/Nutrition  Do you exercise for at least 20 minutes 2-3 times per week?: (!) No  Have you lost any weight without trying in the past 3 months?: No  Do you eat only one meal per day?: No  Have you seen the dentist within the past year?: (!) No  Body mass index: (!) 56.96  Health Habits/Nutrition Interventions: · Inadequate physical activity:  patient agrees to increase physical activity as follows: Patient says he walks inside of the house, advised to start walking more  · Nutritional issues:  educational materials for healthy, well-balanced diet provided, low-carb diet for diabetes  · Dental exam overdue:  patient declines dental evaluation, he says he has no teeth    Hearing/Vision:   Hearing Screening    125Hz 250Hz 500Hz 1000Hz 2000Hz 3000Hz 4000Hz 6000Hz 8000Hz   Right ear:            Left ear:               Visual Acuity Screening    Right eye Left eye Both eyes   Without correction: 20/25 20/25 20/30   With correction:        Hearing/Vision  Do you or your family notice any trouble with your hearing that hasn't been managed with hearing aids?: (!) Yes  Do you have difficulty driving, watching TV, or doing any of your daily activities because of your eyesight?: No  Have you had an eye exam within the past year?: Yes  Hearing/Vision Interventions:  · Hearing concerns:  ENT referral provided  · Vision concerns:  patient encouraged to make appointment with his/her eye specialist      Personalized Preventive Plan   Current Health Maintenance Status  Immunization History   Administered Date(s) Administered    DT (pediatric) 12/14/1998    Hepatitis B Adult (Engerix-B) 05/29/2019, 07/31/2019, 12/04/2019    Hepatitis B Adult (Recombivax HB) 05/29/2019, 07/31/2019, 12/04/2019    Influenza Virus Vaccine 12/16/2013, 10/23/2014, 10/12/2015    Influenza, Quadv, IM, PF (6 mo and older Fluzone, Flulaval, Fluarix, and 3 yrs and older Afluria) 10/11/2016, 11/04/2017, 09/18/2018, 10/04/2019, 10/09/2020    Pneumococcal Polysaccharide ([id number]) 05/23/2013    Tdap (Boostrix, Adacel) 09/12/2018        Health Maintenance   Topic Date Due    COVID-19 Vaccine (1 of 2) 12/30/1980    Shingles Vaccine (1 of 2) 12/30/2014    Colon cancer screen colonoscopy  11/03/2017    Annual Wellness Visit (AWV)  05/29/2019  Diabetic retinal exam  10/24/2019    Diabetic foot exam  05/02/2020    A1C test (Diabetic or Prediabetic)  04/20/2021    Lipid screen  09/25/2021    Potassium monitoring  01/20/2022    Creatinine monitoring  01/20/2022    DTaP/Tdap/Td vaccine (3 - Td) 09/12/2028    Hepatitis B vaccine  Completed    Flu vaccine  Completed    Pneumococcal 0-64 years Vaccine  Completed    Hepatitis C screen  Completed    HIV screen  Completed    Hepatitis A vaccine  Aged Out    Hib vaccine  Aged Out    Meningococcal (ACWY) vaccine  Aged Out     Recommendations for [employer] Due: see orders and patient instructions/AVS.  . Recommended screening schedule for the next 5-10 years is provided to the patient in written form: see Patient Brett Vivas was seen today for medicare aw. Diagnoses and all orders for this visit:    Routine general medical examination at a health care facility    ACP (advance care planning)  -     [facility] Referral to ACP Clinical Specialist    Bilateral hearing loss, unspecified hearing loss type  Failing to change as expected. -     Cam Paige MD, Otolaryngology, [facility]    Spinal stenosis of lumbar region without neurogenic claudication  Stable   s/p 4 surgeries  -     oxyCODONE HCl (OXY-IR) 10 MG immediate release tablet; Take 1 tablet by mouth every 8 hours as needed for Pain for up to 30 days. Elavil, tizanidine, lidocaine, stretching, repositioning  Chronic midline low back pain with left-sided sciatica  Failing to change as expected. He declines referral to pain management  Attempt to lose weight  -     oxyCODONE HCl (OXY-IR) 10 MG immediate release tablet; Take 1 tablet by mouth every 8 hours as needed for Pain for up to 30 days. Elavil, tizanidine, lidocaine, stretching, repositioning  DDD (degenerative disc disease), lumbar  Likely worsening due to wear and tear nature of the disease. -     oxyCODONE HCl (OXY-IR) 10 MG immediate release tablet; Take 1 tablet by mouth every 8 hours as needed for Pain for up to 30 days. Postlaminectomy syndrome of lumbar region x 4 back surgeries  -     oxyCODONE HCl (OXY-IR) 10 MG immediate release tablet; Take 1 tablet by mouth every 8 hours as needed for Pain for up to 30 days. Chronic diastolic congestive heart failure (HCC)  Stable  Lab Results   Component Value Date    LVEF 53 03/29/2018    LVEFMODE Echo 08/29/2017     Continue current cardiac medications, update blood work  I advised Li Barrow to make appointment with cardiologist and they gave him the contact information with Dr Raymundo Abad. The patient verbalizes understanding and agrees with the plan. -     Comprehensive Metabolic Panel; Future  -     Brain Natriuretic Peptide; Future  -     Magnesium; Future  -     CBC Auto Differential; Future    Benign hypertensive heart and CKD, stage 3 (GFR 30-59), w CHF (HCC)  Worsening chronic kidney disease stage III, GFR 38 on 1/20/2021, was 53 on 7/13/2020  Well-controlled hypertension  Continue current medications  Discussed low salt diet and BP and pulse monitoring daily    -     Comprehensive Metabolic Panel; Future  -     Magnesium; Future  -     Phosphorus; Future  -     CBC Auto Differential; Future    Dry skin dermatitis  worsening    -start     ammonium lactate (LAC-HYDRIN) 12 % lotion; Apply topically daily. For dry skin    Excoriation  -     mupirocin (BACTROBAN) 2 % ointment; Apply topically 2 times daily on the affected area for 7-10 days. OK to substitute to cream  Careful review of urgent symptoms and need for immediate medical attention if condition worsens. Patient expressed understanding.  Advised to go call us if cellulitis develops or any drainage and will add an antibiotic by mouth    Colon cancer screening declined despite counseling    Anemia, unspecified type Patient did his blood work today and anemia is worsening, referral placed as discussed today at appointment  -     Cb David MD, Gastroenterology, [place]    Colon cancer screening  -     Cb David MD, Gastroenterology, [place]           . I advised Brittany Wills to make appointment with cardiology. The patient verbalizes understanding and agrees with the plan. I advised Brittany Wills to make appointment with Ophthalmology  . The patient verbalizes understanding and agrees with the plan. Controlled Substance Monitoring:    Acute and Chronic Pain Monitoring:   RX Monitoring 2/23/2021   Attestation -   Periodic Controlled Substance Monitoring Possible medication side effects, risk of tolerance/dependence & alternative treatments discussed. ;No signs of potential drug abuse or diversion identified. ;Assessed functional status.    Chronic Pain > 50 MEDD -   Chronic Pain > 80 MEDD -       Future Appointments   Date Time Provider William Givensi   3/31/2021  7:50 AM [facility]   4/9/2021  9:00 AM Isaiah Finn MD [facility]   7/20/2021  8:00 AM [facility]

## 2021-02-23 NOTE — PROGRESS NOTES
 Colon cancer screen colonoscopy  11/03/2017    Annual Wellness Visit (AWV)  05/29/2019    Diabetic retinal exam  10/24/2019    Diabetic foot exam  05/02/2020    A1C test (Diabetic or Prediabetic)  04/20/2021    Lipid screen  09/25/2021    Potassium monitoring  01/20/2022    Creatinine monitoring  01/20/2022    DTaP/Tdap/Td vaccine (3 - Td) 09/12/2028    Hepatitis B vaccine  Completed    Flu vaccine  Completed    Pneumococcal 0-64 years Vaccine  Completed    Hepatitis C screen  Completed    HIV screen  Completed    Hepatitis A vaccine  Aged Out    Hib vaccine  Aged Out    Meningococcal (ACWY) vaccine  Aged Out

## 2021-02-23 NOTE — RESULT ENCOUNTER NOTE
ABNORMAL. Please notify patient. Worsening anemia, hemoglobin 11.4, was 12.2, 1-month ago, was 13.4, 3 months ago. That means he might be bleeding even if he does not see.   Please let him know I would like him to see the GI specialist and they will place a referral  Chronic kidney disease stage III stable  Blood glucose very high 239, stop drinking any pop    Future Appointments  3/31/2021  7:50 AM    [facility]ICATION MGMT       [facility]MT   [facility]  4/9/2021   9:00 AM    Vince Long MD [facility] NEURO     [facility]  7/20/2021  8:00 AM    [facility] Byrne [facility]     [facility]

## 2021-02-23 NOTE — PATIENT INSTRUCTIONS
Call for appointment  Eliane Duong DO Consulting Physician Cardiology 08/21/2016 End  8/21/16   Phone: [phone]; Fax: [phone]            Schedule a Vaccine  When you qualify to receive the vaccine, call the [facility] COVID-19 Vaccination Hotline to schedule your appointment or to get additional information about the [facility]) locations which are offering the COVID-19 vaccine. To be 94% effective, it's important that you receive two doses of one of the COVID-19 vaccines. -If you are receiving the Dixon Peter vaccine, your second shot will be scheduled as close to 21 days after the first shot as possible. -If you are receiving the Moderna vaccine, your second shot will be scheduled as close to 28 days after the first shot as possible. [facility]) COVID-19 Vaccination Hotline: [phone]    Links to [facility]) website and [facility] website:    [email or web address]    [email or web address]        Personalized Preventive Plan for Keaton Blanco. - 2/23/2021  Medicare offers a range of preventive health benefits. Some of the tests and screenings are paid in full while other may be subject to a deductible, co-insurance, and/or copay. Some of these benefits include a comprehensive review of your medical history including lifestyle, illnesses that may run in your family, and various assessments and screenings as appropriate. After reviewing your medical record and screening and assessments performed today your provider may have ordered immunizations, labs, imaging, and/or referrals for you. A list of these orders (if applicable) as well as your Preventive Care list are included within your After Visit Summary for your review.     Other Preventive Recommendations: Sodium is a mineral found in many foods. In general, most people consume much more sodium than they need. Diets high in sodium can increase blood pressure and lead to edema (water retention). On a heart-healthy diet, you should consume no more than 2,300 mg (milligrams) of sodium per dayabout the amount in one teaspoon of table salt. The foods highest in sodium include table salt (about 50% sodium), processed foods, convenience foods, and preserved foods. Cholesterol    Cholesterol is a fat-like, waxy substance in your blood. Our bodies make some cholesterol. It is also found in animal products, with the highest amounts in fatty meat, egg yolks, whole milk, cheese, shellfish, and organ meats. On a heart-healthy diet, you should limit your cholesterol intake to less than 200 mg per day. It is normal and important to have some cholesterol in your bloodstream. But too much cholesterol can cause plaque to build up within your arteries, which can eventually lead to a heart attack or stroke. The two types of cholesterol that are most commonly referred to are:   Low-density lipoprotein (LDL) cholesterol  Also known as bad cholesterol, this is the cholesterol that tends to build up along your arteries. Bad cholesterol levels are increased by eating fats that are saturated or hydrogenated. Optimal level of this cholesterol is less than 100. Over 130 starts to get risky for heart disease. High-density lipoprotein (HDL) cholesterol  Also known as good cholesterol, this type of cholesterol actually carries cholesterol away from your arteries and may, therefore, help lower your risk of having a heart attack. You want this level to be high (ideally greater than 60). It is a risk to have a level less than 40. You can raise this good cholesterol by eating olive oil, canola oil, avocados, or nuts. Exercise raises this level, too.    Fat Fat is calorie dense and packs a lot of calories into a small amount of food. Even though fats should be limited due to their high calorie content, not all fats are bad. In fact, some fats are quite healthful. Fat can be broken down into four main types. The good-for-you fats are:   Monounsaturated fat  found in oils such as olive and canola, avocados, and nuts and natural nut butters; can decrease cholesterol levels, while keeping levels of HDL cholesterol high   Polyunsaturated fat  found in oils such as safflower, sunflower, soybean, corn, and sesame; can decrease total cholesterol and LDL cholesterol   Omega-3 fatty acids  particularly those found in fatty fish (such as salmon, trout, tuna, mackerel, herring, and sardines); can decrease risk of arrhythmias, decrease triglyceride levels, and slightly lower blood pressure   The fats that you want to limit are:   Saturated fat  found in animal products, many fast foods, and a few vegetables; increases total blood cholesterol, including LDL levels   Animal fats that are saturated include: butter, lard, whole-milk dairy products, meat fat, and poultry skin   Vegetable fats that are saturated include: hydrogenated shortening, palm oil, coconut oil, cocoa butter   Hydrogenated or trans fat  found in margarine and vegetable shortening, most shelf stable snack foods, and fried foods; increases LDL and decreases HDL     It is generally recommended that you limit your total fat for the day to less than 30% of your total calories. If you follow an 1800-calorie heart healthy diet, for example, this would mean 60 grams of fat or less per day. Saturated fat and trans fat in your diet raises your blood cholesterol the most, much more than dietary cholesterol does. For this reason, on a heart-healthy diet, less than 7% of your calories should come from saturated fat and ideally 0% from trans fat. On an 1800-calorie diet, this translates into less than 14 grams of saturated fat per day, leaving 46 grams of fat to come from mono- and polyunsaturated fats.    Food Choices on a Heart Healthy Diet   Food Category   Foods Recommended   Foods to Avoid   Grains   Breads and rolls without salted tops Most dry and cooked cereals Unsalted crackers and breadsticks Low-sodium or homemade breadcrumbs or stuffing All rice and pastas   Breads, rolls, and crackers with salted tops High-fat baked goods (eg, muffins, donuts, pastries) Quick breads, self-rising flour, and biscuit mixes Regular bread crumbs Instant hot cereals Commercially prepared rice, pasta, or stuffing mixes   Vegetables   Most fresh, frozen, and low-sodium canned vegetables Low-sodium and salt-free vegetable juices Canned vegetables if unsalted or rinsed   Regular canned vegetables and juices, including sauerkraut and pickled vegetables Frozen vegetables with sauces Commercially prepared potato and vegetable mixes   Fruits   Most fresh, frozen, and canned fruits All fruit juices   Fruits processed with salt or sodium   Milk   Nonfat or low-fat (1%) milk Nonfat or low-fat yogurt Cottage cheese, low-fat ricotta, cheeses labeled as low-fat and low-sodium   Whole milk Reduced-fat (2%) milk Malted and chocolate milk Full fat yogurt Most cheeses (unless low-fat and low salt) Buttermilk (no more than 1 cup per week)   Meats and Beans Lean cuts of fresh or frozen beef, veal, lamb, or pork (look for the word loin) Fresh or frozen poultry without the skin Fresh or frozen fish and some shellfish Egg whites and egg substitutes (Limit whole eggs to three per week) Tofu Nuts or seeds (unsalted, dry-roasted), low-sodium peanut butter Dried peas, beans, and lentils   Any smoked, cured, salted, or canned meat, fish, or poultry (including villegas, chipped beef, cold cuts, hot dogs, sausages, sardines, and anchovies) Poultry skins Breaded and/or fried fish or meats Canned peas, beans, and lentils Salted nuts   Fats and Oils   Olive oil and canola oil Low-sodium, low-fat salad dressings and mayonnaise   Butter, margarine, coconut and palm oils, villegas fat   Snacks, Sweets, and Condiments   Low-sodium or unsalted versions of broths, soups, soy sauce, and condiments Pepper, herbs, and spices; vinegar, lemon, or lime juice Low-fat frozen desserts (yogurt, sherbet, fruit bars) Sugar, cocoa powder, honey, syrup, jam, and preserves Low-fat, trans-fat free cookies, cakes, and pies Kanu and animal crackers, fig bars, sabrina snaps   High-fat desserts Broth, soups, gravies, and sauces, made from instant mixes or other high-sodium ingredients Salted snack foods Canned olives Meat tenderizers, seasoning salt, and most flavored vinegars   Beverages   Low-sodium carbonated beverages Tea and coffee in moderation Soy milk   Commercially softened water   Suggestions   Make whole grains, fruits, and vegetables the base of your diet. Choose heart-healthy fats such as canola, olive, and flaxseed oil, and foods high in heart-healthy fats, such as nuts, seeds, soybeans, tofu, and fish. Eat fish at least twice per week; the fish highest in omega-3 fatty acids and lowest in mercury include salmon, herring, mackerel, sardines, and canned chunk light tuna. If you eat fish less than twice per week or have high triglycerides, talk to your doctor about taking fish oil supplements. Read food labels. For products low in fat and cholesterol, look for fat free, low-fat, cholesterol free, saturated fat free, and trans fat freeAlso scan the Nutrition Facts Label, which lists saturated fat, trans fat, and cholesterol amounts. For products low in sodium, look for sodium free, very low sodium, low sodium, no added salt, and unsalted   Skip the salt when cooking or at the table; if food needs more flavor, get creative and try out different herbs and spices. Garlic and onion also add substantial flavor to foods. Trim any visible fat off meat and poultry before cooking, and drain the fat off after cornelius. Use cooking methods that require little or no added fat, such as grilling, boiling, baking, poaching, broiling, roasting, steaming, stir-frying, and sauting. Avoid fast food and convenience food. They tend to be high in saturated and trans fat and have a lot of added salt. Talk to a registered dietitian for individualized diet advice. Last Reviewed: March 2011 Aneudy Leija MS, MPH, RD   Updated: 3/29/2011   ·     Heart-Healthy Diet   Sodium, Fat, and Cholesterol Controlled Diet       What Is a Heart Healthy Diet? A heart-healthy diet is one that limits sodium , certain types of fat , and cholesterol .  This type of diet is recommended for:   People with any form of cardiovascular disease (eg, coronary heart disease , peripheral vascular disease , previous heart attack , previous stroke )   People with risk factors for cardiovascular disease, such as high blood pressure , high cholesterol , or diabetes   Anyone who wants to lower their risk of developing cardiovascular disease   Sodium Eat fish at least twice per week; the fish highest in omega-3 fatty acids and lowest in mercury include salmon, herring, mackerel, sardines, and canned chunk light tuna. If you eat fish less than twice per week or have high triglycerides, talk to your doctor about taking fish oil supplements. Read food labels. For products low in fat and cholesterol, look for fat free, low-fat, cholesterol free, saturated fat free, and trans fat freeAlso scan the Nutrition Facts Label, which lists saturated fat, trans fat, and cholesterol amounts. For products low in sodium, look for sodium free, very low sodium, low sodium, no added salt, and unsalted   Skip the salt when cooking or at the table; if food needs more flavor, get creative and try out different herbs and spices. Garlic and onion also add substantial flavor to foods. Trim any visible fat off meat and poultry before cooking, and drain the fat off after cornelius. Use cooking methods that require little or no added fat, such as grilling, boiling, baking, poaching, broiling, roasting, steaming, stir-frying, and sauting. Avoid fast food and convenience food. They tend to be high in saturated and trans fat and have a lot of added salt. Talk to a registered dietitian for individualized diet advice. Last Reviewed: March 2011 Juani Marcial MS, MPH, RD   Updated: 3/29/2011   ·     Preventing Osteoporosis: After Your Visit  Your Care Instructions  Osteoporosis means the bones are weak and thin enough that they can break easily. The older you are, the more likely you are to get osteoporosis. But with plenty of calcium, vitamin D, and exercise, you can help prevent osteoporosis. The preteen and teen years are a key time for bone building. With the help of calcium, vitamin D, and exercise in those early years and beyond, the bones reach their peak density and strength by age 27. After age 27, your bones naturally start to thin and weaken. Talk to your doctor about taking a calcium plus vitamin D supplement. Ask about what type of calcium is right for you, and how much to take at a time. Adults ages 23 to 48 should not get more than 2,500 mg of calcium and 4,000 IU of vitamin D each day, whether it is from supplements and/or food. Adults ages 46 and older should not get more than 2,000 mg of calcium and 4,000 IU of vitamin D each day from supplements and/or food. Get regular bone-building exercise. Weight-bearing and resistance exercises keep bones healthy by working the muscles and bones against gravity. Start out at an exercise level that feels right for you. Add a little at a time until you can do the following:  Do 30 minutes of weight-bearing exercise on most days of the week. Walking, jogging, stair climbing, and dancing are good choices. Do resistance exercises with weights or elastic bands 2 to 3 days a week. Limit alcohol. Drink no more than 1 alcohol drink a day if you are a woman. Drink no more than 2 alcohol drinks a day if you are a man. Do not smoke. Smoking can make bones thin faster. If you need help quitting, talk to your doctor about stop-smoking programs and medicines. These can increase your chances of quitting for good. When should you call for help? Watch closely for changes in your health, and be sure to contact your doctor if:  You need help with a healthy eating plan. You need help with an exercise plan    © 2553-5820 [employer]. Care instructions adapted under license by [facility]. This care instruction is for use with your licensed healthcare professional. If you have questions about a medical condition or this instruction, always ask your healthcare professional. [address] any warranty or liability for your use of this information. Content Version: 9.4.71440;  Last Revised: June 20, 2011              ·     DASH Diet: After Your Visit  Your Care Instructions The DASH diet is an eating plan that can help lower your blood pressure. DASH stands for Dietary Approaches to Stop Hypertension. Hypertension is high blood pressure. The DASH diet focuses on eating foods that are high in calcium, potassium, and magnesium. These nutrients can lower blood pressure. The foods that are highest in these nutrients are fruits, vegetables, low-fat dairy products, nuts, seeds, and legumes. But taking calcium, potassium, and magnesium supplements instead of eating foods that are high in those nutrients does not have the same effect. The DASH diet also includes whole grains, fish, and poultry. The DASH diet is one of several lifestyle changes your doctor may recommend to lower your high blood pressure. Your doctor may also want you to decrease the amount of sodium in your diet. Lowering sodium while following the DASH diet can lower blood pressure even further than just the DASH diet alone. Follow-up care is a key part of your treatment and safety. Be sure to make and go to all appointments, and call your doctor if you are having problems. Its also a good idea to know your test results and keep a list of the medicines you take. How can you care for yourself at home? Following the DASH diet  Eat 4 to 5 servings of fruit each day. A serving is 1 medium-sized piece of fruit, ½ cup chopped or canned fruit, 1/4 cup dried fruit, or 4 ounces (½ cup) of fruit juice. Choose fruit more often than fruit juice. Eat 4 to 5 servings of vegetables each day. A serving is 1 cup of lettuce or raw leafy vegetables, ½ cup of chopped or cooked vegetables, or 4 ounces (½ cup) of vegetable juice. Choose vegetables more often than vegetable juice. Get 2 to 3 servings of low-fat and fat-free dairy each day. A serving is 8 ounces of milk, 1 cup of yogurt, or 1 ½ ounces of cheese. Types of treatment  Based on your measurements and your medical history, your doctor or nurse can determine what combination of weight loss treatments would work best for you. Treatments may include changes in lifestyle, exercise, dieting, and, in some cases, weight loss medicines or weight loss surgery. Weight loss surgery, also called bariatric surgery, is reserved for people with severe obesity who have not responded to other weight loss treatments. SETTING A WEIGHT LOSS GOAL  It is important to set a realistic weight loss goal. Your first goal should be to avoid gaining more weight and staying at your current weight (or within 5 percent). Many people have a \"dream\" weight that is difficult or impossible to achieve. People at high risk of developing diabetes who are able to lose 5 percent of their body weight and maintain this weight will reduce their risk of developing diabetes by about 50 percent and reduce their blood pressure. This is a success. Losing more than 15 percent of your body weight and staying at this weight is an extremely good result, even if you never reach your \"dream\" or \"ideal\" weight. LIFESTYLE CHANGES  Programs that help you to change your lifestyle are usually run by psychologists or other professionals. The goals of lifestyle changes are to help you change your eating habits, become more active, and be more aware of how much you eat and exercise, helping you to make healthier choices. This type of treatment can be broken down into three steps: The triggers that make you want to eat   Eating   What happens after you eat  Triggers to eat  Determining what triggers you to eat involves figuring out what foods you eat and where and when you eat. To figure out what triggers you to eat, keep a record for a few days of everything you eat, the places where you eat, how often you eat, and the emotions you were feeling when you ate. For some people, the trigger is related to a certain time of day or night. For others, the trigger is related to a certain place, like sitting at a desk working. Eating  You can change your eating habits by breaking the chain of events between the trigger for eating and eating itself. There are many ways to do this. For instance, you can:  Limit where you eat to a few places (eg, dining room)   Restrict the number of utensils (eg, only a fork) used for eating   Drink a sip of water between each bite   Chew your food a certain number of times   Get up and stop eating every few minutes  What happens after you eat  Rewarding yourself for good eating behaviors can help you to develop better habits. This is not a reward for weight loss; instead, it is a reward for changing unhealthy behaviors. Do not use food as a reward. Some people find money, clothing, or personal care (eg, a hair cut, manicure, or massage) to be effective rewards. Treat yourself immediately after making better eating choices to reinforce the value of the good behavior. You need to have clear behavior goals, and you must have a time frame for reaching your goals. Reward small changes along the way to your final goal.  Other factors that contribute to successful weight loss  Changing your behavior involves more than just changing unhealthy eating habits; it also involves finding people around you to support your weight loss, reducing stress, and learning to be strong when tempted by food. Establish a \"pamela\" system  Having a friend or family member available to provide support and reinforce good behavior is very helpful. The support person needs to understand your goals. Learn to be strong  Learning to be strong when tempted by food is an important part of losing weight. As an example, you will need to learn how to say \"no\" and continue to say no when urged to eat at parties and social gatherings. Develop strategies for events before you go, such as eating before you go or taking low-calorie snacks and drinks with you. Develop a support system  Having a support system is helpful when losing weight. This is why many commercial groups are successful. Family support is also essential; if your family does not support your efforts to lose weight, this can slow your progress or even keep you from losing weight. Positive thinking  People often have conversations with themselves in their head; these conversations can be positive or negative. If you eat a piece of cake that was not planned, you may respond by thinking, \"Oh, you stupid idiot, you've blown your diet! \" and as a result, you may eat more cake. A positive thought for the same event could be, \"Well, I ate cake when it was not on my plan. Now I should do something to get back on track. \" A positive approach is much more likely to be successful than a negative one. Reduce stress  Although stress is a part of everyday life, it can trigger uncontrolled eating in some people. It is important to find a way to get through these difficult times without eating or by eating low-calorie food, like raw vegetables. It may be helpful to imagine a relaxing place that allows you to temporarily escape from stress. With deep breaths and closed eyes, you can imagine this relaxing place for a few minutes. Self-help programs  Self-help programs like [employer] Watchers®, Overeaters Anonymous®, and Take Off Freida (TOPS)© work for some people. As with all weight loss programs, you are most likely to be successful with these plans if you make long-term changes in how you eat. Carbohydrates are found in fruits, vegetables, and grains (including breads, rice, pasta, and cereal), alcoholic beverages, and in dairy products. Meat and fish do not contain carbohydrates. Side effects of very-low-carbohydrate diets can include constipation, headache, bad breath, muscle cramps, diarrhea, and weakness. Mediterranean diet  The term \"Mediterranean diet\" refers to a way of eating that is common in olive-growing regions around the [facility]. Although there is some variation in Mediterranean diets, there are some similarities. Most Mediterranean diets include:  A high level of monounsaturated fats (from olive or canola oil, walnuts, pecans, almonds) and a low level of saturated fats (from butter)   A high amount of vegetables, fruits, legumes, and grains (7 to 10 servings of fruits and vegetables per day)   A moderate amount of milk and dairy products, mostly in the form of cheese. Use low-fat dairy products (skim milk, fat-free yogurt, low-fat cheese). A relatively low amount of red meat and meat products. Substitute fish or poultry for red meat. For those who drink alcohol, a modest amount (mainly as red wine) may help to protect against cardiovascular disease. A modest amount is up to one (4 ounce) glass per day for women and up to two glasses per day for men. Which diet is best?  Studies have compared different diets, including:  Very-low-carbohydrate (Atkins)   Macronutrient balance controlling glycemic load (Zone®)   Reduced-calorie (Weight Watchers®)   Very-low-fat (Ornish)  No one diet is \"best\" for weight loss. Any diet will help you to lose weight if you stick with the diet. Therefore, it is important to choose a diet that includes foods you like. Fad diets  Fad diets often promise quick weight loss (more than 1 to 2 pounds per week) and may claim that you do not need to exercise or give up favorite foods. Some fad diets cost a lot of money, because you have to pay for seminars or pills. Fad diets generally lack any scientific evidence that they are safe and effective, but instead rely on \"before\" and \"after\" photos or testimonials. Diets that sound too good to be true usually are. These plans are a waste of time and money and are not recommended. A doctor, nurse, or nutritionist can help you find a safe and effective way to lose weight and keep it off. WEIGHT LOSS [place] a weight loss medicine may be helpful when used in combination with diet, exercise, and lifestyle changes. However, it is important to understand the risks and benefits of these medicines. It is also important to be realistic about your goal weight using a weight loss medicine; you may not reach your \"dream\" weight, but you may be able to reduce your risk of diabetes or heart disease. Weight loss medicines may be recommended for people who have not been able to lose weight with diet and exercise who have a:  BMI of 30 or more    BMI between 27 and 29.9 and have other medical problems, such as diabetes, high cholesterol, or high blood pressure  Two weight loss medicines are approved in the [place] for long-term use. These are sibutramine and orlistat. Other weight loss medicines (phentermine, diethylpropion) are available but are only approved for short-term use (up to 12 weeks). Sibutramine  Sibutramine (Meridia®, Reductil®) is a medicine that reduces your appetite. In people who take the medicine for one year, the average weight loss is 10 percent of the initial body weight (5 percent more than those who took a placebo treatment). Side effects of sibutramine include insomnia, dry mouth, and constipation. Increases in blood pressure can occur. Therefore, blood pressure is usually monitored during treatment. There is no evidence that sibutramine causes heart or lung problems (like dexfenfluramine and fenfluramine (Phen/Fen)). However, experts agree that sibutramine should not used by people with coronary heart disease, heart failure, uncontrolled hypertension, stroke, irregular heart rhythms, or peripheral vascular disease (poor circulation in the legs). Orlistat  Orlistat (Xenical® 120 mg capsules) is a medicine that reduces the amount of fat your body absorbs from the foods you eat. A lower-dose version is now available without a prescription (Johnnie® 60 mg capsules) in many countries, including the [place]. The medicine is recommended three times per day, taken with a meal; you can skip a dose if you skip a meal or if the meal contains no fat. After one year of treatment with orlistat, the average weight loss is approximately 8 to 10 percent of initial body weight (4 percent more than in those who took a placebo). Cholesterol levels often improve, and blood pressure sometimes falls. In people with diabetes, orlistat may help control blood sugar levels. Side effects occur in 15 to 10 percent of people and may include stomach cramps, gas, diarrhea, leakage of stool, or oily stools. These problems are more likely when you take orlistat with a high-fat meal (if more than 30 percent of calories in the meal are from fat). Side effects usually improve as you learn to avoid high-fat foods. Severe liver injury has been reported rarely in patients taking orlistat, but it is not known if orlistat caused the liver problems. Diet supplements  Diet supplements are widely used by people who are trying to lose weight, although the safety and efficacy of these supplements are often unproven. A few of the more common diet supplements are discussed below; none of these are recommended because they have not been studied carefully, and there is no proof they are safe or effective. Chitosan and wheat dextrin are ineffective for weight loss, and their use is not recommended. Ephedra, a compound related to ephedrine, is no longer available in the [facility] due to safety concerns. Many nonprescription diet pills previously contained ephedra. Although some studies have shown that ephedra helps with weight loss, there can be serious side effects (psychiatric symptoms, palpitations, and stomach upset), including death. There are not enough data about the safety and efficacy of chromium, ginseng, glucomannan, green tea, hydroxycitric acid, L carnitine, psyllium, pyruvate supplements, Maverick wort, and conjugated linoleic acid. Two supplements from [facility], [address] [place] (also known as the Magali Castro 15 pill) and Herbathin dietary supplement, have been shown to contain prescription drugs. Hoodia gordonii is a dietary supplement derived from a plant in [place]. It is not recommended because there is no proof that it is safe or effective. Bitter orange (Citrus aurantium) can increase your heart rate and blood pressure and is not recommended. SHOULD I HAVE SURGERY TO LOSE WEIGHT?   Weight loss surgery is recommended ONLY for people with one of the following:  Severe obesity (body mass index above 40) (calculator 1 and calculator 2) who have not responded to diet, exercise, or weight loss medicines   Body mass index between 35 and 40, along with a serious medical problem (including diabetes, severe joint pain, or sleep apnea) that would improve with weight loss You should be sure that you understand the potential risks and benefits of weight loss surgery. You must be motivated and willing to make lifelong changes in how you eat to reach and maintain a healthier weight after surgery. You must also be realistic about weight loss after surgery (see 'Effectiveness of weight loss surgery' below). PREPARING FOR WEIGHT LOSS SURGERY  Most people who have weight loss surgery will meet with several specialists before surgery is scheduled. This often includes a dietitian, mental health counselor, a doctor who specializes in care of obese people, and a surgeon who performs weight loss surgery (bariatric surgeon). You may need to work with these providers for several weeks or months before surgery. The nutritionist will explain what and how much you will be able to eat after surgery. You may also need to lose a small amount of weight before surgery. The mental health specialist will help you to cope with stress and other factors that can make it harder to lose weight or trigger you to eat   The medical doctor will determine whether you need other tests, counseling, or treatment before surgery. He or she might also help you begin a medical weight loss program so that you can lose some weight before surgery. The bariatric surgeon will meet with you to discuss the surgeries available to treat obesity. He or she will also make sure you are a good candidate for surgery. TYPES OF WEIGHT LOSS SURGERY  There are several types of weight loss surgeries, the most common being lap banding, gastric bypass, and gastric sleeve. Lap banding  Laparoscopic adjustable gastric banding (LAGB), or lap banding, is a surgery that uses an adjustable band around the opening to the stomach (figure 1). This reduces the amount of food that you can eat at one time. Lap banding is done through small incisions, with a laparoscope. The band can be adjusted after surgery, allowing you to eat more or less food. Adjustments to the size and tightness of the band are made by using a needle to add or remove fluid from a port (a small container under the skin that is connected to the band). Adding fluid to the band makes it tighter which restricts the amount of food you can eat and may help you to lose more weight. Lap banding is a popular choice because it is relatively simple to perform, can be adjusted or removed, and has a low risk of serious complications immediately after surgery. However, weight loss with the lap band depends on your ability to follow the program closely. You will need to prepare nutritious meals that [facility] with\" the band, not against it. For example, the lap band will not work well if you eat or drink a large amount of liquid calories (like ice cream). The band will not help you to feel full when you eat/drink liquid calories. Weight loss ranges from 45 to 75 percent after two years. As an example, a person who is 120 pounds overweight could expect to lose approximately 54 to 90 pounds in the two years after lap banding. Gastric bypass  Satish-en-Y gastric bypass, also called gastric bypass, helps you to lose weight by reducing the amount of food you can eat and reducing the number of calories and nutrients you absorb from the food you eat. To perform gastric bypass, a surgeon creates a small stomach pouch by dividing the stomach and attaching it to the small intestine. This helps you to lose weight in two ways: The smaller stomach can hold less food than before surgery. This causes you to feel full after eating a very small amount of food or liquid. Over time, the pouch might stretch, allowing you to eat more food. The body absorbs fewer calories, since food bypasses most of the stomach as well as the upper small intestine. This new arrangement seems to decrease your appetite and change how you break down foods by changing the release of various hormones. Gastric bypass can be performed as open surgery (through an incision on the abdomen) or laparoscopically, which uses smaller incisions and smaller instruments. Both the laparoscopic and open techniques have risks and benefits. You and your surgeon should work together to decide which surgery, if any, is right for you. Gastric bypass has a high success rate, and people lose an average of 62 to 68 percent of their excess body weight in the first year. Weight loss typically levels off after one to two years, with an overall excess weight loss between 50 and 75 percent. For a person who is 120 pounds overweight, an average of 60 to 90 pounds of weight loss would be expected. Gastric sleeve  Gastric sleeve, also known as sleeve gastrectomy, is a surgery that reduces the size of the stomach and makes it into a narrow tube (figure 3). The new stomach is much smaller and produces less of the hormone (ghrelin) that causes hunger, helping you feel satisfied with less food. Sleeve gastrectomy is safer than gastric bypass because the intestines are not rearranged, and there is less chance of malnutrition. It also appears to control hunger better than lap banding. It might be safer than the lap banding because no foreign materials are used. The gastric sleeve has a good success rate, and people lose an average of 33 percent of their excess body weight in the first year. For a person who is 120 pounds overweight, this would mean losing about 40 pounds in the first year. WEIGHT LOSS SURGERY COMPLICATIONS  A variety of complications can occur with weight loss surgery. The risks of surgery depend upon which surgery you have and any medical problems you had before surgery. Some of the more common early surgical complications (one to six weeks after surgery) include:  Bleeding   Infection   Blockage or tear in the bowels   Need for further surgery  Important medical complications after surgery can include blood clots in the legs or lungs, heart attack, pneumonia, and urinary tract infection. Complications are less likely when surgery is performed in centers that are experienced in weight loss surgery. In general, centers with experience in weight loss surgery have:  Board-certified doctors and surgeons   A team of support staff (dietitians, counselors, nurses)   Long-term follow-up after surgery   Hospital staff experienced with the care of weight loss patients. This includes nurses who are trained in the care of patients immediately after surgery and anesthesiologists who are experienced in caring for the morbidly obese. EFFECTIVENESS OF WEIGHT LOSS SURGERY  The goal of weight loss surgery is to reduce the risk of illness or death associated with obesity. Weight loss surgery can also help you to feel and look better, reduce the amount of money you spend on medicines, and cut down on sick days. As an example, weight loss surgery can improve health problems related to obesity (diabetes, high blood pressure, high cholesterol, sleep apnea) to the point that you need less or no medicine. Finally, weight loss surgery might reduce your risk of developing heart disease, cancer, and certain infections. Recovering from surgery and losing weight can be stressful and emotional, and it is important to have the support of family and friends. Working with a , therapist, or support group can help you through the ups and downs. WHERE TO GET MORE INFORMATION  Your healthcare provider is the best source of information for questions and concerns related to your medical problem. This article will be updated as needed every four months on our Web site ([email or web address])  ·     High-Fiber Diet     What Is Fiber? Dietary fiber is a form of carbohydrate found in plants that cannot be digested by humans. All plants contain fiber, including fruits, vegetables, grains, and legumes. Fiber is often classified into two categories: soluble and insoluble. Soluble fiber draws water into the bowel and can help slow digestion. Examples of foods that are high in soluble fiber include oatmeal, oat bran, barley, legumes (eg, beans and peas), apples, and strawberries. Insoluble fiber speeds digestion and can add bulk to the stool. Examples of foods that are high in insoluble fiber include whole-wheat products, wheat bran, cauliflower, green beans, and potatoes. Why Follow a High-Fiber Diet? A high-fiber diet is often recommended to prevent and treat constipation , hemorrhoids , diverticulitis , and irritable bowel syndrome . Eating a high-fiber diet can also help improve your cholesterol levels, lower your risk of coronary heart disease , reduce your risk of type 2 diabetes , and lower your weight. For people with type 1 or 2 diabetes, a high-fiber diet can also help stabilize blood sugar levels. How Much Fiber Should I Eat? A high-fiber diet should contain  20-35 grams  of fiber a day. This is actually the amount recommended for the general adult population; however, most Americans eat only 15 grams of fiber per day.    Digestion of Fiber Eating a higher fiber diet than usual can take some getting used to by your body's digestive system. To avoid the side effects of sudden increases in dietary fiber (eg, gas, cramping, bloating, and diarrhea), increase fiber gradually and be sure to drink plenty of fluids every day. Tips for Increasing Fiber Intake   Whenever possible, choose whole grains over refined grains (eg, brown rice instead of white rice, whole-wheat bread instead of white bread). Include a variety of grains in your diet, such as wheat, rye, barley, oats, quinoa, and bulgur. Eat more vegetarian-based meals. Here are some ideas: black bean burgers, eggplant lasagna, and veggie tofu stir-antunez. Choose high-fiber snacks, such as fruits, popcorn, whole-grain crackers, and nuts. Make whole-grain cereal or whole-grain toast part of your daily breakfast regime. When eating out, whether ordering a sandwich or dinner, ask for extra vegetables. When baking, replace part of the white flour with whole-wheat flour. Whole-wheat flour is particularly easy to incorporate into a recipe. High-Fiber Diet Eating Guide   Food Category   Foods Recommended   Notes   Grains   Whole-grain breads, muffins, bagels, or alena bread Rye bread Whole-wheat crackers or crisp breads Whole-grain or bran cereals Oatmeal, oat bran, or grits Wheat germ Whole-wheat pasta and brown rice   Read the ingredients list on food labels. Look for products that list \"whole\" as the first ingredient (eg, whole-wheat, whole oats). Choose cereals with at least 2 grams of fiber per serving.    Vegetables   All vegetables, especially asparagus, bean sprouts, broccoli, Tensed sprouts, cabbage, carrots, cauliflower, celery, corn, greens, green beans, green pepper, onions, peas, potatoes (with skin), snow peas, spinach, squash, sweet potatoes, tomatoes, zucchini   For maximum fiber intake, eat the peels of fruits and vegetablesjust be sure to wash them well first.   Fruits All fruits, especially apples, berries, grapefruits, mangoes, nectarines, oranges, peaches, pears, dried fruits (figs, dates, prunes, raisins)   Choose raw fruits and vegetables over juice, cooked, or cannedraw fruit has more fiber. Dried fruit is also a good source of fiber. Milk   With the exception of yogurt containing inulin (a type of fiber), dairy foods provide little fiber. Add more fiber by topping your yogurt or cottage cheese with fresh fruit, whole grain or bran cereals, nuts, or seeds. Meats and Beans   All beans and peas, especially Garbanzo beans, kidney beans, lentils, lima beans, split peas, and ca beans All nuts and seeds, especially almonds, peanuts, [place] nuts, cashews, peanut butter, walnuts, sesame and sunflower seeds All meat, poultry, fish, and eggs   Increase fiber in meat dishes by adding ca beans, kidney beans, black-eyed peas, bran, or oatmeal. If you are following a low-fat diet, use nuts and seeds only in moderation. Fats and Oils   All in moderation   Fats and oils do not provide fiber   Snacks, Sweets, and Condiments   Fruit Nuts Popcorn, whole-wheat pretzels, or trail mix made with dried fruits, nuts, and seeds Cakes, breads, and cookies made with oatmeal or whole-wheat flour   Most snack foods do not provide much fiber. Choose snacks with at least 2 grams of fiber per serving. Last Reviewed: March 2011 Cordelia Ojeda MS, MPH, RD   Updated: 3/29/2011   ·     Safer Sex: After Your Visit  Your Care Instructions  Safer sex is a way to reduce your risk of getting an infection spread through sex. It can also help prevent pregnancy. Most infections that are spread through sex, also called sexually transmitted infections or STIs, can be cured. But some can decrease your chances of getting pregnant if they are not treated early. Others, such as herpes, have no cure. And some, such as HIV, can be deadly. Several products can help you practice safer sex and reduce your chance of STIs. One of the best is a condom. There are condoms for men and for women. The female condom is a tube of soft plastic with a closed end that is placed deep into the vagina. You can use a special rubber sheet (dental dam) for protection during oral sex. Latex gloves can keep your hands from touching blood, semen, or other body fluids that can carry infections. Remember that birth control methods such as diaphragms, IUDs, foams, and birth control pills do not stop you from getting STIs. Follow-up care is a key part of your treatment and safety. Be sure to make and go to all appointments, and call your doctor if you are having problems. Its also a good idea to know your test results and keep a list of the medicines you take. How can you care for yourself at home? Think about getting shots to prevent hepatitis A and hepatitis B. These two diseases can be spread through sex. You also can get hepatitis A if you eat infected food. Use condoms or female condoms each time and every time you have sex. Learn the right way to use a male condom:  Condoms come in several sizes. Make sure you use the right size. A condom that is too small can break easily. A condom that is too big can slip off during sex. Use a new condom each time you have sex. Be careful not to poke a hole in the condom when you open the wrapper. Squeeze the tip of the condom to keep out air. Pull down the loose skin (foreskin) from the head of an uncircumcised penis. While squeezing the tip of the condom, unroll it all the way down to the base of the firm penis. Never use petroleum jelly (such as Vaseline), grease, hand lotion, baby oil, or anything with oil in it. These products can make holes in the condom. After sex, hold the condom on your penis as you remove your penis from your partner. This will keep semen from spilling out of the condom. Learn to use a female condom:  You can put in a female condom up to 8 hours before sex. Squeeze the smaller ring at the closed end and insert it deep into the vagina. The larger ring at the open end should stay outside the vagina. During sex, make sure the penis goes into the condom. After the penis is removed, close the open end of the condom by twisting it. Remove the condom. Do not use a female condom and male condom at the same time. Do not have sex with anyone who has symptoms of an STI, such as sores on the genitals or mouth. The herpes virus that causes cold sores can spread to and from the penis and vagina. Do not drink a lot of alcohol or use drugs before sex. This can cause you to let down your guard and not practice safer sex. Having one sex partner (who does not have STIs and does not have sex with anyone else) is a sure way to avoid STIs. Talk to your partner before you have sex. Find out if he or she has or is at risk for any STI. Keep in mind that a person may be able to spread an STI even if he or she does not have symptoms. You and your partner may want to get an HIV test. You should get tested again 6 months later. © 5091-5065 [employer]. Care instructions adapted under license by [facility]. This care instruction is for use with your licensed healthcare professional. If you have questions about a medical condition or this instruction, always ask your healthcare professional. [place] [address] any warranty or liability for your use of this information. Content Version: 9.4.01236; Last Revised: January 19, 2012              ·     Keep Your Memory [place]       Many factors can affect your ability to remembera hectic lifestyle, aging, stress, chronic disease, and certain medicines. But, there are steps you can take to sharpen your mind and help preserve your memory.    Challenge Your Brain Regularly challenging your mind may help keeps it in top shape. Good mental exercises include:   Crossword puzzlesUse a dictionary if you need it; you will learn more that way. Brainteasers Try some! Crafts, such as wood working and sewing   Hobbies, such as gardening and building model airplanes   SocializingVisit old friends or join groups to meet new ones. Reading   Learning a new language   Taking a class, whether it be art history or trinh chi   TravelingExperience the food, history, and culture of your destination   Learning to use a computer   Going to museums, the theater, or thought-provoking movies   Changing things in your daily life, such as reversing your pattern in the grocery store or brushing your teeth using your nondominant hand   Use Memory Aids   There is no need to remember every detail on your own. These memory aids can help:   Calendars and day planners   Electronic organizers to store all sorts of helpful informationThese devices can \"beep\" to remind you of appointments. A book of days to record birthdays, anniversaries, and other occasions that occur on the same date every year   Detailed \"to-do\" lists and strategically placed sticky notes   Quick \"study\" sessionsBefore a gathering, review who will be there so their names will be fresh in your mind. Establish routinesFor example, keep your keys, wallet, and umbrella in the same place all the time or take medicine with your 8:00 AM glass of juice   Live a Healthy Life   Many actions that will keep your body strong will do the same for your mind.  For example:   Talk to Your Doctor About Herbs and Supplements Malnutrition and vitamin deficiencies can impair your mental function. For example, vitamin B12 deficiency can cause a range of symptoms, including confusion. But, what if your nutritional needs are being met? Can herbs and supplements still offer a benefit? Researchers have investigated a range of natural remedies, such as ginkgo , ginseng , and the supplement phosphatidylserine (PS). So far, though, the evidence is inconsistent as to whether these products can improve memory or thinking. If you are interested in taking herbs and supplements, talk to your doctor first because they may interact with other medicines that you are taking. Exercise Regularly    Among the many benefits of regular exercise are increased blood flow to the brain and decreased risk of certain diseases that can interfere with memory function. One study found that even moderate exercise has a beneficial effect. Examples of \"moderate\" exercise include:   Playing 18 holes of golf once a week, without a cart   Playing tennis twice a week   Walking one mile per day   Manage Stress    It can be tough to remember what is important when your mind is cluttered. Make time for relaxation. Choose activities that calm you down, and make it routine. Manage Chronic Conditions    Side effects of high blood pressure , diabetes, and heart disease can interfere with mental function. Many of the lifestyle steps discussed here can help manage these conditions. Strive to eat a healthy diet, exercise regularly, get stress under control, and follow your doctor's advice for your condition. Minimize Medications    Talk to your doctor about the medicines that you take. Some may be unnecessary. Also, healthy lifestyle habits may lower the need for certain drugs.      Last Reviewed: April 2010 Tylor Garcia MD   Updated: 4/13/2010   ·   Smoking Cessation This document explains the best ways for you to quit smoking and new treatments to help. It lists new medicines that can double or triple your chances of quitting and quitting for good. It also considers ways to avoid relapses and concerns you may have about quitting, including weight gain. NICOTINE: A POWERFUL ADDICTION  If you have tried to quit smoking, you know how hard it can be. It is hard because nicotine is a very addictive drug. For some people, it can be as addictive as heroin or cocaine. Usually, people make 2 or 3 tries, or more, before finally being able to quit. Each time you try to quit, you can learn about what helps and what hurts. Quitting takes hard work and a lot of effort, but you can quit smoking. QUITTING SMOKING IS ONE OF THE MOST IMPORTANT THINGS YOU WILL EVER DO. You will live longer, feel better, and live better. The impact on your body of quitting smoking is felt almost immediately:  Within 20 minutes, blood pressure decreases. Pulse returns to its normal level. After 8 hours, carbon monoxide levels in the blood return to normal. Oxygen level increases. After 24 hours, chance of heart attack starts to decrease. Breath, hair, and body stop smelling like smoke. After 48 hours, damaged nerve endings begin to recover. Sense of taste and smell improve. After 72 hours, the body is virtually free of nicotine. Bronchial tubes relax and breathing becomes easier. After 2 to 12 weeks, lungs can hold more air. Exercise becomes easier and circulation improves. Quitting will reduce your risk of having a heart attack, stroke, cancer, or lung disease:  After 1 year, the risk of coronary heart disease is cut in half. After 5 years, the risk of stroke falls to the same as a nonsmoker. After 10 years, the risk of lung cancer is cut in half and the risk of other cancers decreases significantly. After 15 years, the risk of coronary heart disease drops, usually to the level of a nonsmoker. If you are pregnant, quitting smoking will improve your chances of having a healthy baby. The people you live with, especially your children, will be healthier. You will have extra money to spend on things other than cigarettes. FIVE KEYS TO QUITTING  Studies have shown that these 5 steps will help you quit smoking and quit for good. You have the best chances of quitting if you use them together:  Get ready. Get support and encouragement. Learn new skills and behaviors. Get medicine to reduce your nicotine addiction and use it correctly. Be prepared for relapse or difficult situations. Be determined to continue trying to quit, even if you do not succeed at first.  1. GET READY  Set a quit date. Change your environment. Get rid of ALL cigarettes, ashtrays, matches, and lighters in your home, car, and place of work. Do not let people smoke in your home. Review your past attempts to quit. Think about what worked and what did not. Once you quit, do not smoke. NOT EVEN A PUFF! 2. GET SUPPORT AND ENCOURAGEMENT  Studies have shown that you have a better chance of being successful if you have help. You can get support in many ways. Tell your family, friends, and coworkers that you are going to quit and need their support. Ask them not to smoke around you. Talk to your caregivers (doctor, dentist, nurse, pharmacist, psychologist, and/or smoking counselor). Get individual, group, or telephone counseling and support. The more counseling you have, the better your chances are of quitting. Programs are available at [facility]. Call your local health department for information about programs in your area. Spiritual beliefs and practices may help some smokers quit. Quit meters are small computer programs online or downloadable that keep track of quit statistics, such as amount of \"quit-time,\" cigarettes not smoked, and money saved. Many smokers find one or more of the many self-help books available useful in helping them quit and stay off tobacco.  3. LEARN NEW SKILLS AND BEHAVIORS  Try to distract yourself from urges to smoke. Talk to someone, go for a walk, or occupy your time with a task. When you first try to quit, change your routine. Take a different route to work. Drink tea instead of coffee. Eat breakfast in a different place. Do something to reduce your stress. Take a hot bath, exercise, or read a book. Plan something enjoyable to do every day. Reward yourself for not smoking. Explore interactive web-based programs that specialize in helping you quit. 4. GET MEDICINE AND USE IT CORRECTLY  Medicines can help you stop smoking and decrease the urge to smoke. Combining medicine with the above behavioral methods and support can quadruple your chances of successfully quitting smoking. The [place]. Food and Drug Administration (FDA) has approved 7 medicines to help you quit smoking. These medicines fall into 3 categories. Nicotine replacement therapy (delivers nicotine to your body without the negative effects and risks of smoking):  Nicotine gum: Available over-the-counter. Nicotine lozenges: Available over-the-counter. Nicotine inhaler: Available by prescription. Nicotine nasal spray: Available by prescription. Nicotine skin patches (transdermal): Available by prescription and over-the-counter. Antidepressant medicine (helps people abstain from smoking, but how this works is unknown): Bupropion sustained-release (SR) tablets: Available by prescription. Nicotinic receptor partial agonist (simulates the effect of nicotine in your brain):  Varenicline tartrate tablets: Available by prescription. Ask your caregiver for advice about which medicines to use and how to use them. Carefully read the information on the package. Everyone who is trying to quit may benefit from using a medicine. If you are pregnant or trying to become pregnant, nursing an infant, you are under age 25, or you smoke fewer than 10 cigarettes per day, talk to your caregiver before taking any nicotine replacement medicines. You should stop using a nicotine replacement product and call your caregiver if you experience nausea, dizziness, weakness, vomiting, fast or irregular heartbeat, mouth problems with the lozenge or gum, or redness or swelling of the skin around the patch that does not go away. Do not use any other product containing nicotine while using a nicotine replacement product. Talk to your caregiver before using these products if you have diabetes, heart disease, asthma, stomach ulcers, you had a recent heart attack, you have high blood pressure that is not controlled with medicine, a history of irregular heartbeat, or you have been prescribed medicine to help you quit smoking. 5. BE PREPARED FOR RELAPSE OR DIFFICULT SITUATIONS  Most relapses occur within the first 3 months after quitting. Do not be discouraged if you start smoking again. Remember, most people try several times before they finally quit. You may have symptoms of withdrawal because your body is used to nicotine. You may crave cigarettes, be irritable, feel very hungry, cough often, get headaches, or have difficulty concentrating. The withdrawal symptoms are only temporary. They are strongest when you first quit, but they will go away within 10 to 14 days. Here are some difficult situations to watch for:  Alcohol. Avoid drinking alcohol. Drinking lowers your chances of successfully quitting. Caffeine. Try to reduce the amount of caffeine you consume. It also lowers your chances of successfully quitting. Other smokers. Being around smoking can make you want to smoke. Avoid smokers. Weight gain. Many smokers will gain weight when they quit, usually less than 10 pounds. Eat a healthy diet and stay active. Do not let weight gain distract you from your main goal, quitting smoking. Some medicines that help you quit smoking may also help delay weight gain. You can always lose the weight gained after you quit. Bad mood or depression. There are a lot of ways to improve your mood other than smoking. If you are having problems with any of these situations, talk to your caregiver. SPECIAL SITUATIONS AND CONDITIONS  Studies suggest that everyone can quit smoking. Your situation or condition can give you a special reason to quit. Pregnant women/new mothers: By quitting, you protect your baby's health and your own. Hospitalized patients: By quitting, you reduce health problems and help healing. Heart attack patients: By quitting, you reduce your risk of a second heart attack. Lung, head, and neck cancer patients: By quitting, you reduce your chance of a second cancer. Parents of children and adolescents: By quitting, you protect your children from illnesses caused by secondhand smoke. QUESTIONS TO THINK ABOUT  Think about the following questions before you try to stop smoking. You may want to talk about your answers with your caregiver. Why do you want to quit? If you tried to quit in the past, what helped and what did not? What will be the most difficult situations for you after you quit? How will you plan to handle them? Who can help you through the tough times? Your family? Friends? Caregiver? What pleasures do you get from smoking? What ways can you still get pleasure if you quit? Here are some questions to ask your caregiver: How can you help me to be successful at quitting? What medicine do you think would be best for me and how should I take it? What should I do if I need more help? What is smoking withdrawal like?  How can I get information on withdrawal? Quitting takes hard work and a lot of effort, but you can quit smoking. FOR MORE INFORMATION   Smokefree. gov ([employer]) provides free, accurate, evidence-based information and professional assistance to help support the immediate and long-term needs of people trying to quit smoking. Document Released: 12/12/2002 Document Revised: 12/06/2012 Document Reviewed: 10/04/2010  [facility] Patient Information ©2012 Sadaf Toscano. ·     Keeping Home a [facility]       As we get older, changes in balance, gait, strength, vision, hearing, and cognition make even the most youthful senior more prone to accidents. Falls are one of the leading health risks for older people. This increased risk of falling is related to:   Aging process (eg, decreased muscle strength, slowed reflexes)   Higher incidence of chronic health problems (eg, arthritis, diabetes) that may limit mobility, agility or sensory awareness   Side effects of medicine (eg, dizziness, blurred vision)especially medicines like prescription pain medicines and drugs used to treat mental health conditions   Depending on the brittleness of your bones, the consequences of a fall can be serious and long lasting. Home Life   Research by the Association of Aging [facility]) shows that some home accidents among older adults can be prevented by making simple lifestyle changes and basic modifications and repairs to the home environment. Here are some lifestyle changes that experts recommend:   Have your hearing and vision checked regularly. Be sure to wear prescription glasses that are right for you. Speak to your doctor or pharmacist about the possible side effects of your medicines. A number of medicines can cause dizziness. If you have problems with sleep, talk to your doctor. Limit your intake of alcohol. If necessary, use a cane or walker to help maintain your balance. Wear supportive, rubber-soled shoes, even at home. If you live in a region that gets wintry weather, you may want to put special cleats on your shoes to prevent you from slipping on the snow and ice. Exercise regularly to help maintain muscle tone, agility, and balance. Always hold the banister when going up or down stairs. Also, use  bars when getting in or out of the bath or shower, or using the toilet. To avoid dizziness, get up slowly from a lying down position. Sit up first, dangling your legs for a minute or two before rising to a standing position. Overall Home Safety Check   According to the Consumer Product Safety Commision's \"Older Consumer Home Safety Checklist,\" it is important to check for potential hazards in each room. And remember, proper lighting is an essential factor in home safety. If you cannot see clearly, you are more likely to fall. Important questions to ask yourself include:   Are lamp, electric, extension, and telephone cords placed out of the flow of traffic and maintained in good condition? Have frayed cords been replaced? Are all small rugs and runners slip resistant? If not, you can secure them to the floor with a special double-sided carpet tape. Are smoke detectors properly locatedone on every floor of your home and one outside of every sleeping area? Are they in good working order? Are batteries replaced at least once a year? Do you have a well-maintained carbon monoxide detector outside every sleeping are in your home? Does your furniture layout leave plenty of space to maneuver between and around chairs, tables, beds, and sofas? Are hallways, stairs and passages between rooms well lit? Can you reach a lamp without getting out of bed? Are floor surfaces well maintained? Shag rugs, high-pile carpeting, tile floors, and polished wood floors can be particularly slippery. Stairs should always have handrails and be carpeted or fitted with a non-skid tread. Is your telephone easily reachable. Is the cord safely tucked away? Room by Room   According to the Association of Aging, bathrooms and tom are the two most potentially hazardous rooms in your home. In the Kitchen    Be sure your stove is in proper working order and always make sure burners and the oven are off before you go out or go to sleep. Keep pots on the back burners, turn handles away from the front of the stove, and keep stove clean and free of grease build-up. Kitchen ventilation systems and range exhausts should be working properly. Keep flammable objects such as towels and pot holders away from the cooking area except when in use. Make sure kitchen curtains are tied back. Move cords and appliances away from the sink and hot surfaces. If extension cords are needed, install wiring guides so they do not hang over the sink, range, or working areas. Look for coffee pots, kettles and toaster ovens with automatic shut-offs. Keep a mop handy in the kitchen so you can wipe up spills instantly. You should also have a small fire extinguisher. Arrange your kitchen with frequently used items on lower shelves to avoid the need to stand on a stepstool to reach them. Make sure countertops are well-lit to avoid injuries while cutting and preparing food. In the Bathroom    Use a non-slip mat or decals in the tub and shower, since wet, soapy tile or porcelain surfaces are extremely slippery. Make sure bathroom rugs are non-skid or tape them firmly to the floor. Bathtubs should have at least one, preferably two, grab bars, firmly attached to structural supports in the wall. (Do not use built-in soap holders or glass shower doors as grab bars.)    Tub seats fitted with non-slip material on the legs allow you to wash sitting down. For people with limited mobility, bathtub transfer benches allow you to slide safely into the tub. Raised toilet seats and toilet safety rails are helpful for those with knee or hip problems. In the [facility]    Make sure you use a nightlight and that the area around your bed is clear of potential obstacles. Be careful with electric blankets and never go to sleep with a heating pad, which can cause serious burns even if on a low setting. Use fire-resistant mattress covers and pillows, and NEVER smoke in bed. Keep a phone next to the bed that is programmed to dial 911 at the push of a button. If you have a chronic condition, you may want to sign on with an automatic call-in service. Typically the system includes a small pendant that connects directly to an emergency medical voice-response system. You should also make arrangements to stay in contact with someonefriend, neighbor, family memberon a regular schedule. Fire Prevention   According to the [employer]. (Smoke Alarms for Every) [address] [place] [address], senior citizens are one of the two highest risk groups for death and serious injuries due to residential fires. When cooking, wear short-sleeved items, never a bulky long-sleeved robe. The [facility]'s Safety Checklist for Older Consumers emphasizes the importance of checking basements, garages, workshops and storage areas for fire hazards, such as volatile liquids, piles of old rags or clothing and overloaded circuits. Never smoke in bed or when lying down on a couch or recliner chair. Small portable electric or kerosene heaters are responsible for many home fires and should be used cautiously if at all. If you do use one, be sure to keep them away from flammable materials. In case of fire, make sure you have a pre-established emergency exit plan. Have a professional check your fireplace and other fuel-burning appliances yearly.     [facility] [employer] Will ask questions to get needed information. Lives near you or agrees to travel to you if needed. Your family may help you make medical decisions while you can still be part of that process. But it's important to choose one person to be your health care agent in case you aren't able to make decisions for yourself. If you don't fill out the legal form and name a health care agent, the decisions your family can make may be limited. A health care agent may be called something else in your state. Who will make decisions for you if you don't have a health care agent? If you don't have a health care agent or a living will, you may not get the care you want. Decisions may be made by family members who disagree about your medical care. Or decisions may be made by a medical professional who doesn't know you well. In some cases, a  makes the decisions. When you name a health care agent, it is very clear who has the power to make health decisions for you. How do you name a health care agent? You name your health care agent on a legal form. This form is usually called a medical power of . Ask your hospital, state bar association, or office on aging where to find these forms. You must sign the form to make it legal. Some states require you to get the form notarized. This means that a person called a  watches you sign the form and then he or she signs the form. Some states also require that two or more witnesses sign the form. Be sure to tell your family members and doctors who your health care agent is. Where can you learn more? Go to https://иван.[email or web address]. org and sign in to your [employer] account. Enter [id number] in the [facility] box to learn more about \"Learning About [address]. \"     If you do not have an account, please click on the \"Sign Up Now\" link.   Current as of: December 9, 2019               Content Version: 12.6 © 4949-6049 Healthwise, Incorporated. Care instructions adapted under license by [facility] ([facility]). If you have questions about a medical condition or this instruction, always ask your healthcare professional. [address] any warranty or liability for your use of this information.     ·

## 2021-02-24 ENCOUNTER — TELEPHONE (OUTPATIENT)
Dept: GASTROENTEROLOGY | Age: 56
End: 2021-02-24

## 2021-02-24 RX ORDER — MELATONIN
1000 DAILY
Qty: 90 TABLET | Refills: 1 | Status: SHIPPED | OUTPATIENT
Start: 2021-02-24 | End: 2021-08-25 | Stop reason: SDUPTHER

## 2021-02-24 RX ORDER — DOCUSATE SODIUM 100 MG/1
100 CAPSULE, LIQUID FILLED ORAL 2 TIMES DAILY
Qty: 180 CAPSULE | Refills: 3 | Status: ON HOLD | OUTPATIENT
Start: 2021-02-24 | End: 2021-04-12 | Stop reason: ALTCHOICE

## 2021-02-24 RX ORDER — SPIRONOLACTONE 50 MG/1
50 TABLET, FILM COATED ORAL DAILY
Qty: 90 TABLET | Refills: 3 | Status: ON HOLD
Start: 2021-02-24 | End: 2021-10-04 | Stop reason: HOSPADM

## 2021-02-24 NOTE — TELEPHONE ENCOUNTER
Lab Results   Component Value Date     02/23/2021    K 4.6 02/23/2021     02/23/2021    CO2 25 02/23/2021    BUN 44 02/23/2021    CREATININE 1.94 02/23/2021    GLUCOSE 339 02/23/2021    CALCIUM 9.2 02/23/2021      Normal potassium

## 2021-02-24 NOTE — TELEPHONE ENCOUNTER
From: Kylah Limon. To: Juice Portillo MD  Sent: 2/23/2021 7:31 PM EST  Subject: Non-Urgent Medical Question    Will you please Refill my spironolactone 50 mg Tablets. And Stool softener 100 mg softgel .  And can vitamin D3 1000 iu ta thank you

## 2021-03-02 ENCOUNTER — TELEPHONE (OUTPATIENT)
Dept: SPIRITUAL SERVICES | Age: 56
End: 2021-03-02

## 2021-03-04 ENCOUNTER — OFFICE VISIT (OUTPATIENT)
Dept: GASTROENTEROLOGY | Age: 56
End: 2021-03-04
Payer: COMMERCIAL

## 2021-03-04 VITALS
TEMPERATURE: 97.2 F | HEART RATE: 92 BPM | DIASTOLIC BLOOD PRESSURE: 76 MMHG | WEIGHT: 315 LBS | BODY MASS INDEX: 58.18 KG/M2 | SYSTOLIC BLOOD PRESSURE: 186 MMHG | RESPIRATION RATE: 24 BRPM

## 2021-03-04 DIAGNOSIS — R19.7 DIARRHEA, UNSPECIFIED TYPE: ICD-10-CM

## 2021-03-04 DIAGNOSIS — E66.01 MORBID OBESITY WITH BMI OF 50.0-59.9, ADULT (HCC): ICD-10-CM

## 2021-03-04 DIAGNOSIS — Z12.11 SCREEN FOR COLON CANCER: ICD-10-CM

## 2021-03-04 DIAGNOSIS — D64.9 ANEMIA, UNSPECIFIED TYPE: Primary | ICD-10-CM

## 2021-03-04 PROCEDURE — G8417 CALC BMI ABV UP PARAM F/U: HCPCS | Performed by: INTERNAL MEDICINE

## 2021-03-04 PROCEDURE — G8482 FLU IMMUNIZE ORDER/ADMIN: HCPCS | Performed by: INTERNAL MEDICINE

## 2021-03-04 PROCEDURE — 1036F TOBACCO NON-USER: CPT | Performed by: INTERNAL MEDICINE

## 2021-03-04 PROCEDURE — APPSS45 APP SPLIT SHARED TIME 31-45 MINUTES: Performed by: NURSE PRACTITIONER

## 2021-03-04 PROCEDURE — 99215 OFFICE O/P EST HI 40 MIN: CPT | Performed by: INTERNAL MEDICINE

## 2021-03-04 PROCEDURE — G8427 DOCREV CUR MEDS BY ELIG CLIN: HCPCS | Performed by: INTERNAL MEDICINE

## 2021-03-04 PROCEDURE — 3017F COLORECTAL CA SCREEN DOC REV: CPT | Performed by: INTERNAL MEDICINE

## 2021-03-04 ASSESSMENT — ENCOUNTER SYMPTOMS
BLOOD IN STOOL: 0
ABDOMINAL PAIN: 1
ANAL BLEEDING: 0
WHEEZING: 1
TROUBLE SWALLOWING: 0
CHOKING: 0
SHORTNESS OF BREATH: 1
VOMITING: 0
ABDOMINAL DISTENTION: 0
NAUSEA: 0
DIARRHEA: 1
CONSTIPATION: 0
RECTAL PAIN: 0
COUGH: 1

## 2021-03-04 NOTE — PROGRESS NOTES
GI OFFICE FOLLOW UP    INTERVAL HISTORY:   Andrew Nash MD  [address]  [address] [facility],  [address] [place] [address]    Chief Complaint   Patient presents with    Anemia     Patient is f/u on anemia. He was last seen in 2018       HISTORY OF PRESENT ILLNESS:     Patient being seen for evaluation of anemia. Patient last seen in the office in 2018. On chart review it appears patient has baseline hgb of around 12. Most recent Hgb 11.4  Denies any overt GI bleeding  Stool are reportedly dark. Patient is currently on oral ferrous sulfate. Denies any significant constipation. Intermittently has diarrhea; self-limiting. Patient is morbidly obese. Has good appetite. No weight loss  Has intermittent heartburns. Takes PPI therapy with improvement of symptoms. No dysphagia, odynophagia. Has BARBY, COPD on O2, CHF, CAD with stent placement 2017, DM I. On Plavix. Past Medical,Family, and Social History reviewed and does contribute to the patient presenting condition. Patient's PMH/PSH,SH,PSYCH Hx, MEDs, ALLERGIES, and ROS were all reviewed and updated in the appropriate sections.  Yes      PAST MEDICAL HISTORY:  Past Medical History:   Diagnosis Date    Acute bronchitis with chronic obstructive pulmonary disease (COPD) [facility]     Acute on chronic kidney failure ([facility] 7/20/2017    Acute on chronic respiratory failure (HCC) 10/2/2018    Adhesive capsulitis of left shoulder 3/25/2017    Anxiety 10/2/2016    Arthropathy, unspecified, other specified sites 6/13/2013    Asthma     Bilateral lower leg cellulitis 2/17/2016    Blood in stool     CAD (coronary artery disease)     Cellulitis of both lower extremities 5/25/2017    Cellulitis of leg, left 07/20/2017    CHF (congestive heart failure), NYHA class III (HCC) 8/14/2013    Chronic back pain     Chronic bronchitis ([facility])     Chronic headaches     was referred to neuro, testing scheduled    Chronic kidney disease     Chronic respiratory failure (HCC)     Chronic ulcer of left leg, with fat layer exposed ([address] 2/22/2019    Class 2 severe obesity due to excess calories with serious comorbidity and body mass index (BMI) of 35.0 to 35.9 in adult (HCC)     (BMI 35.0-39.9 without comorbidity)    COPD (chronic obstructive pulmonary disease) ([address]     COPD exacerbation ([address] 11/2/2016    Diabetic neuropathy ([address] 8/14/2013    Displacement of lumbar intervertebral disc without myelopathy 6/13/2013    Ear infection     RIGHT    Essential hypertension     Facial cellulitis 2012    Fall 3/25/2017    Former smoker     GERD (gastroesophageal reflux disease)     Head injury     Hearing loss in right ear     pencil pierced ear as a child    Hepatic steatosis 12/3/2015    History of general anesthesia complication     has woke up during surgery under anesthesia    History of rib fracture 12/3/2015    Chronic     Hyperlipidemia     Hypersomnia     Hypertension     Insomnia     Intolerance of continuous positive airway pressure (CPAP) ventilation 7/20/2017    Mastoiditis of left side     Mixed conductive and sensorineural hearing loss of both ears 1/10/2017    Per ENT    Mixed type COPD (chronic obstructive pulmonary disease) ([address]     Moderate recurrent major depression ([address] 10/2/2016    Morbid obesity with BMI of 45.0-49.9, adult ([address] 6/16/2015    Neuropathy     Open wound of groin 12/19/2018    BARBY on CPAP     BARBY treated with BiPAP     Osteoarthritis     Otitis externa of left ear     Pancreatitis chronic     Persistent depressive disorder 11/19/2019    Renal insufficiency     proteinuria    S/P cardiac cath 04/23/2018    Severe depression ([address] 9/25/2013    Spinal stenosis of lumbar region without neurogenic claudication 1/6/2016    MRI lumbar 12/30/15 L3-L4: There is broad-based bulging disc which appears protruding left laterally causing flattening of the ventral thecal sac. In addition, there is facet arthropathy with mild hypertrophic changes.  There is borderline central canal stenosis with  evidence of moderate left neural foraminal narrowing and mild right neural foramina narrowing.   L4-L5: There is broad-based protrud    Syncope 4/28/2017    Tinnitus of both ears 1/10/2017    Per ENT    Type 2 diabetes mellitus with stage 3 chronic kidney disease, with long-term current use of insulin ([facility] 12/26/2016    due to underlying condition with hyperosmolarity without coma    Type II or unspecified type diabetes mellitus without mention of complication, not stated as uncontrolled     uncontrolled    Wears glasses     for reading    Wound of left leg 2/22/2019       Past Surgical History:   Procedure Laterality Date    BACK SURGERY   (x 4) 2000,.12/2011.2/2012     Dr Leonid Crow last 2 surg    CARDIAC CATHETERIZATION  04/23/2018    no stenting    CATARACT REMOVAL WITH IMPLANT Right 11/05/2019    [facility]    CATARACT REMOVAL WITH IMPLANT Left 01/07/2020    [facility]    COLONOSCOPY  11/3/2015    hemorrhoids, poor prep    CORONARY ANGIOPLASTY WITH STENT PLACEMENT  March 2013    x 1    HAND TENDON SURGERY Left     thumb tendon repair    INTRACAPSULAR CATARACT EXTRACTION Right 11/5/2019    EYE CATARACT EMULSIFICATION IOL IMPLANT performed by Perico Mcgee MD at [address] Left 1/7/2020    EYE CATARACT EMULSIFICATION IOL IMPLANT performed by Perico Mcgee MD at [address] ARTHROSCOPY Left     NERVE BLOCK  07-31-15    TENS unit    NC ESOPHAGOGASTRODUODENOSCOPY TRANSORAL DIAGNOSTIC N/A 7/18/2018    EGD ESOPHAGOGASTRODUODENOSCOPY performed by Rene Segura MD at [facility] Bilateral 09/20/2012    Dr Caty José  4/13/2013    normal       CURRENT MEDICATIONS:    Current Outpatient Medications:     spironolactone (ALDACTONE) 50 MG tablet, Take 1 tablet by mouth daily, Disp: 90 tablet, Rfl: 3    docusate sodium (STOOL SOFTENER) 100 MG capsule, Take 1 capsule by mouth 2 times daily, Disp: 180 capsule, Rfl: 3    vitamin D3 (CHOLECALCIFEROL) 25 MCG (1000 UT) TABS tablet, Take 1 tablet by mouth daily, Disp: 90 tablet, Rfl: 1    oxyCODONE HCl (OXY-IR) 10 MG immediate release tablet, Take 1 tablet by mouth every 8 hours as needed for Pain for up to 30 days. , Disp: 90 tablet, Rfl: 0    ammonium lactate (LAC-HYDRIN) 12 % lotion, Apply topically daily. For dry skin, Disp: 1 Bottle, Rfl: 2    mupirocin (BACTROBAN) 2 % ointment, Apply topically 2 times daily on the affected area for 7-10 days. OK to substitute to cream, Disp: 30 g, Rfl: 2    amitriptyline (ELAVIL) 25 MG tablet, Take 1 po qhs x 2 days then 2 po qhs, Disp: 60 tablet, Rfl: 3    nystatin (MYCOSTATIN) 953717 UNIT/GM powder, Apply 1-2 times daily in the skin folds. , Disp: 56.7 g, Rfl: 3    clobetasol (TEMOVATE) 0.05 % ointment, Apply topically 2 times daily for psoriasis, Disp: 1 Tube, Rfl: 3    varenicline (CHANTIX CONTINUING MONTH GUALBERTO) 1 MG tablet, Take 1 tablet by mouth 2 times daily, Disp: 60 tablet, Rfl: 1    ketoconazole (NIZORAL) 2 % cream, Apply twice a day for yeast infection in the skin folds, for 4 weeks, Disp: 1 Tube, Rfl: 3    insulin regular human (HUMULIN R) 500 UNIT/ML concentrated injection vial, Patient using 0.52ml with breakfast, 0.52ml with lunch and 0.45ml with dinner., Disp: 60 mL, Rfl: 3    albuterol (PROVENTIL) (2.5 MG/3ML) 0.083% nebulizer solution, Take 3 mLs by nebulization every 6 hours as needed for Wheezing or Shortness of Breath, Disp: 120 vial, Rfl: 3    pantoprazole (PROTONIX) 40 MG tablet, Take 1 tablet by mouth every morning (before breakfast), Disp: 90 tablet, Rfl: 1    Ferrous Sulfate (IRON) 325 (65 Fe) MG TABS, Take 1 tablet by mouth daily, Disp: 90 tablet, Rfl: 3    metoprolol tartrate (LOPRESSOR) 50 MG tablet, Take 1 tablet by mouth 2 times daily, Disp: 180 tablet, Rfl: 3    magnesium oxide (MAG-OX) 400 (240 Mg) MG tablet, Take 1 tablet by mouth daily, Disp: 90 tablet, Rfl: 3    lisinopril (PRINIVIL;ZESTRIL) 5 MG tablet, Take 1 tablet by mouth daily . Discontinued Lisinopril  10 mg, Disp: 90 tablet, Rfl: 3    fluticasone-salmeterol (ADVAIR HFA) 230-21 MCG/ACT inhaler, Inhale 2 puffs into the lungs 2 times daily, Disp: 12 g, Rfl: 11    tiotropium (SPIRIVA RESPIMAT) 2.5 MCG/ACT AERS inhaler, Inhale 2 puffs into the lungs daily, Disp: 12 g, Rfl: 11    venlafaxine (EFFEXOR XR) 75 MG extended release capsule, Take 1 capsule by mouth daily Take with food, Disp: 90 capsule, Rfl: 3    naloxone 4 MG/0.1ML LIQD nasal spray, 1 spray by Nasal route as needed for Opioid Reversal Due to COPD and sleep apnea, this is a big recommendation for you, Disp: 1 each, Rfl: 5    blood glucose monitor strips, Test 3 times a day & as needed for symptoms of irregular blood glucose. Dispense sufficient amount for indicated testing frequency plus additional to accommodate PRN testing needs. One touch Ultra blue, Disp: 300 strip, Rfl: 0    Lancets MISC, Use to check blood sugar three times daily along with when necessary due to symptoms. , Disp: 300 each, Rfl: 2    Insulin Syringe-Needle U-100 31G X 5/16\" 1 ML MISC, Use to subcutaneously inject insulin three times daily, Disp: 300 each, Rfl: 2    neomycin-polymyxin-hydrocortisone (CORTISPORIN) 3.5-85936-0 otic suspension, Place 3 drops into both ears 4 times daily OK to substitute.  For 10 days, Disp: 1 Bottle, Rfl: 0    clopidogrel (PLAVIX) 75 MG tablet, TAKE ONE TABLET BY MOUTH DAILY, Disp: 90 tablet, Rfl: 3    vitamin B-12 (CYANOCOBALAMIN) 500 MCG tablet, Take 1 tablet by mouth daily, Disp: 90 tablet, Rfl: 3    tiZANidine (ZANAFLEX) 4 MG tablet, TAKE ONE TABLET BY MOUTH EVERY 8 HOURS AS NEEDED FOR BACK PAIN, Disp: 90 tablet, Rfl: 5    escitalopram (LEXAPRO) 10 MG tablet, Take 1 tablet by mouth daily, Disp: 90 tablet, Rfl: 3    pravastatin (PRAVACHOL) 40 MG tablet, Take 1 tablet by mouth nightly, Disp: 90 tablet, Rfl: 3    Oxygen Tubing MISC, by Does not apply route DX COPD. chronic respiratory failure, Disp: 1 each, Rfl: 0    Respiratory Therapy Supplies (NEBULIZER/TUBING/MOUTHPIECE) KIT, Dx COPD needs nebulizer supplies, Disp: 1 kit, Rfl: 11    nitroGLYCERIN (NITROSTAT) 0.4 MG SL tablet, Place 1 tablet under the tongue every 5 minutes as needed for Chest pain (and call 911), Disp: 25 tablet, Rfl: 3    ONE TOUCH ULTRASOFT LANCETS MISC, Patient to test blood sugar up to 4 times daily. , Disp: 300 each, Rfl: 3    bumetanide (BUMEX) 1 MG tablet, Take 3 tablets by mouth 2 times daily, Disp: 540 tablet, Rfl: 3    Lancet Devices (LANCING DEVICE) MISC, Provide patient with lancing device appropriate for his machine/lancing needles. , Disp: 1 each, Rfl: 1    Lidocaine 4 % LOTN, Apply topically, Disp: , Rfl:     metolazone (ZAROXOLYN) 2.5 MG tablet, Take 2.5 mg by mouth three times a week MWF, Disp: , Rfl:     PROAIR  (90 Base) MCG/ACT inhaler, INHALE TWO PUFFS BY MOUTH EVERY 6 HOURS AS NEEDED FOR WHEEZING OR SHORTNESS OF BREATH, Disp: 1 Inhaler, Rfl: 4    Handicap Placard MISC, by Does not apply route Can't walk greater than 200 feet. Expires in 5 years. , Disp: 1 each, Rfl: 0    fluticasone (CUTIVATE) 0.05 % cream, Apply topically 2 times daily , Disp: , Rfl:     fluticasone (FLONASE) 50 MCG/ACT nasal spray, 2 sprays by Nasal route daily (Patient taking differently: 2 sprays by Nasal route daily as needed (sinus symptoms) ), Disp: 1 Bottle, Rfl: 3    Melatonin 10 MG TABS, Take 10 mg by mouth nightly as needed (insomnia), Disp: 90 tablet, Rfl: 1    aspirin 81 MG EC tablet, Take 81 mg by mouth daily. , Disp: , Rfl:     Spacer/Aero Chamber Mouthpiece MISC, 1 each by Does not apply route once as needed (to be used with his inhalers), Disp: 1 each, Rfl: 0 ALLERGIES:   Allergies   Allergen Reactions    Levofloxacin Anaphylaxis     Patient reports needing epinephrine \"about 5 or 6 months ago\" for anaphylaxis (itching, hives, SOB/swelling) after receiving Levofloxacin. Previous report from 2012: Nausea/Vomiting    Lorazepam      Falls      Nsaids      CHF&CKD    Prozac [Fluoxetine Hcl] Other (See Comments)     Pt started with seizures after started taking.  Vancomycin Other (See Comments)     Itching, SOB, emesis upon infusion in ED 2019. Patient states he has had vancomycin \"a number of times\" before without issue. couldn't breath and talk, throat closed       FAMILY HISTORY:       Problem Relation Age of Onset    Heart Disease Mother          age 64 from MI    High Blood Pressure Mother     Diabetes Mother     High Blood Pressure Father          age 80 from CKD and Lung Fibrosis    Kidney Disease Father     Heart Disease Sister     Heart Attack Sister     Obesity Sister     Diabetes Sister          SOCIAL HISTORY:   Social History     Socioeconomic History    Marital status:      Spouse name: Not on file    Number of children: Not on file    Years of education: Not on file    Highest education level: Not on file   Occupational History    Occupation: disability     Comment: unemployed   Social Needs    Financial resource strain: Not on file    Food insecurity     Worry: Not on file     Inability: Not on file   [employer] needs     Medical: Not on file     Non-medical: Not on file   Tobacco Use    Smoking status: Former Smoker     Packs/day: 0.25     Years: 33.00     Pack years: 8.25     Types: Cigarettes     Start date: 1985     Quit date: 2020     Years since quittin.3    Smokeless tobacco: Former User     Types: Snuff     Quit date: 1995   Substance and Sexual Activity    Alcohol use: No     Alcohol/week: 0.0 standard drinks    Drug use: Yes     Types: Marijuana    Sexual activity: Not Currently   Lifestyle    Physical activity     Days per week: Not on file     Minutes per session: Not on file    Stress: Not on file   Relationships    Social connections     Talks on phone: Not on file     Gets together: Not on file     Attends Alevism service: Not on file     Active member of club or organization: Not on file     Attends meetings of clubs or organizations: Not on file     Relationship status: Not on file    Intimate partner violence     Fear of current or ex partner: Not on file     Emotionally abused: Not on file     Physically abused: Not on file     Forced sexual activity: Not on file   Other Topics Concern    Not on file   Social History Narrative    Middle of possible separation 6/22/2016              REVIEW OF SYSTEMS:         Review of Systems   Constitutional: Positive for fatigue and unexpected weight change. Negative for appetite change. HENT: Negative for trouble swallowing. Respiratory: Positive for cough, shortness of breath and wheezing. Negative for choking. Cardiovascular: Positive for chest pain and leg swelling. Negative for palpitations. Gastrointestinal: Positive for abdominal pain and diarrhea. Negative for abdominal distention, anal bleeding, blood in stool, constipation, nausea, rectal pain and vomiting. Genitourinary: Negative for difficulty urinating. Allergic/Immunologic: Negative for environmental allergies and food allergies. Neurological: Negative for dizziness, weakness, light-headedness, numbness and headaches. Hematological: Bruises/bleeds easily. Psychiatric/Behavioral: Negative for sleep disturbance. The patient is not nervous/anxious. PHYSICAL EXAMINATION:     Vital signs reviewed per the nursing documentation. BP (!) 186/76   Pulse 92   Temp 97.2 °F (36.2 °C)   Resp 24   Wt (!) 429 lb (194.6 kg)   BMI 58.18 kg/m²   Body mass index is 58.18 kg/m². Physical Exam  Vitals signs and nursing note reviewed. Constitutional:       General: He is not in acute distress. Appearance: He is well-developed. He is obese. HENT:      Head: Normocephalic and atraumatic. Mouth/Throat:      Pharynx: No oropharyngeal exudate. Eyes:      General: No scleral icterus. Conjunctiva/sclera: Conjunctivae normal.      Pupils: Pupils are equal, round, and reactive to light. Neck:      Musculoskeletal: Normal range of motion and neck supple. Thyroid: No thyromegaly. Trachea: No tracheal deviation. Cardiovascular:      Rate and Rhythm: Normal rate and regular rhythm. Heart sounds: Normal heart sounds. No murmur. Pulmonary:      Effort: Pulmonary effort is normal. No respiratory distress. Breath sounds: Decreased breath sounds present. No wheezing or rales. Abdominal:      General: Bowel sounds are normal. There is no distension. Palpations: Abdomen is soft. There is no hepatomegaly or mass. Tenderness: There is no abdominal tenderness. There is no guarding or rebound. Hernia: No hernia is present. Comments: Limited assessment d/t body habitus     Genitourinary:     Rectum: Normal.   Lymphadenopathy:      Cervical: No cervical adenopathy. Skin:     General: Skin is warm and dry. Findings: No erythema or rash. Neurological:      Mental Status: He is alert and oriented to person, place, and time. Cranial Nerves: No cranial nerve deficit. Psychiatric:         Thought Content:  Thought content normal.           LABORATORY DATA: Reviewed  Lab Results   Component Value Date    WBC 9.7 02/23/2021    HGB 11.4 (L) 02/23/2021    HCT 35.0 (L) 02/23/2021    MCV 85.3 02/23/2021     02/23/2021     02/23/2021    K 4.6 02/23/2021     02/23/2021    CO2 25 02/23/2021    BUN 44 (H) 02/23/2021    CREATININE 1.94 (H) 02/23/2021    LABALBU 4.0 02/23/2021    BILITOT 0.24 (L) 02/23/2021    ALKPHOS 88 02/23/2021    AST 17 02/23/2021    ALT 18 02/23/2021    INR 1.0 06/23/2016         Lab Results   Component Value Date    RBC 4.10 (L) 02/23/2021    HGB 11.4 (L) 02/23/2021    MCV 85.3 02/23/2021    MCH 27.7 02/23/2021    MCHC 32.5 02/23/2021    RDW 15.8 (H) 02/23/2021    MPV 9.1 02/23/2021    BASOPCT 1 02/23/2021    LYMPHSABS 1.20 02/23/2021    MONOSABS 0.60 02/23/2021    NEUTROABS 7.50 02/23/2021    EOSABS 0.30 02/23/2021    BASOSABS 0.10 02/23/2021         DIAGNOSTIC TESTING:     No results found. Assessment  1. Anemia, unspecified type    2. Diarrhea, unspecified type    3. Morbid obesity with BMI of 50.0-59.9, adult ([facility]    4. Screen for colon cancer        Plan  Patient needs screening colonoscopy. Has hx of anemia. Check anemia studies. Patient has significant comorbidities. Risks of procedure discussed with patient and wife. They are agreeable to proceed with colonoscopy. 2 day prep. Has hx of stent placement, CAD, CHF, COPD  Will obtain cardiology clearance. The Endoscopic procedure was explained to the patient in detail  The prep and NPO were explained  All the Risks, Benefits, and Alternatives were explained  Risk of Bleeding, Perforation and Cardio Respiratory risks were explained  his questions were answered  The procedure has been scheduled with the  in the office  Patient was asked to give us a call for any questions  The patient has verbalized understanding and agreement to this plan. The patient was counseled at length about the risks of miller Covid-19 during their perioperative period and any recovery window from their procedure. The patient was made aware that miller Covid-19  may worsen their prognosis for recovering from their procedure  and lend to a higher morbidity and/or mortality risk. All material risks, benefits, and reasonable alternatives including postponing the procedure were discussed. The patient does wish to proceed with the procedure at this time.     Thank you for allowing me to participate in the care of Mr. Ree Matthews. For any further questions please do not hesitate to contact me. I have reviewed and agree with the ROS entered by the MA/LPN. Note is dictated utilizing voice recognition software. Unfortunately this leads to occasional typographical errors. Please contact our office if you have any questions    I independently performed an evaluation on [address] [place] [address]. .  I have reviewed the above documentation completed by the Nurse Practitioner and agree with the documented findings and plan of care. I have personally evaluated this patient with the APRN and have completed at least one if not all key elements of the E/M (history, physical exam, and MDM). Please see my additional contributions to the HPI, physical exam, assessment, and medical decision making.        Abdi Vallejo MD,FACP, [facility]  Board Certified in Gastroenterology and [address] [place] [address] Gastroenterology  Office #: [phone]

## 2021-03-05 ENCOUNTER — PATIENT MESSAGE (OUTPATIENT)
Dept: FAMILY MEDICINE CLINIC | Age: 56
End: 2021-03-05

## 2021-03-05 DIAGNOSIS — H91.93 BILATERAL HEARING LOSS, UNSPECIFIED HEARING LOSS TYPE: Primary | ICD-10-CM

## 2021-03-05 DIAGNOSIS — H60.63 CHRONIC INFECTION OF BOTH EXTERNAL EARS: ICD-10-CM

## 2021-03-05 RX ORDER — BISACODYL 5 MG
TABLET, DELAYED RELEASE (ENTERIC COATED) ORAL
Qty: 2 TABLET | Refills: 0 | Status: ON HOLD | OUTPATIENT
Start: 2021-03-05 | End: 2021-04-12 | Stop reason: ALTCHOICE

## 2021-03-05 RX ORDER — POLYETHYLENE GLYCOL 3350 17 G/17G
POWDER, FOR SOLUTION ORAL
Qty: 238 G | Refills: 0 | Status: ON HOLD | OUTPATIENT
Start: 2021-03-05 | End: 2021-04-12 | Stop reason: ALTCHOICE

## 2021-03-05 NOTE — TELEPHONE ENCOUNTER
From: Kalani Tejada. To: Radha Burris MD  Sent: 3/5/2021 9:39 AM EST  Subject: Non-Urgent Medical Question    Need to fined a E. N.T he no longer takes my Medical see what kind of Magic you can do thank you

## 2021-03-07 DIAGNOSIS — I10 ESSENTIAL HYPERTENSION: ICD-10-CM

## 2021-03-07 DIAGNOSIS — I50.32 CHRONIC DIASTOLIC CONGESTIVE HEART FAILURE (HCC): ICD-10-CM

## 2021-03-08 RX ORDER — BUMETANIDE 1 MG/1
TABLET ORAL
Qty: 540 TABLET | Refills: 2 | Status: SHIPPED | OUTPATIENT
Start: 2021-03-08 | End: 2021-10-23 | Stop reason: SDUPTHER

## 2021-03-09 ENCOUNTER — HOSPITAL ENCOUNTER (OUTPATIENT)
Dept: PREADMISSION TESTING | Age: 56
Discharge: HOME | End: 2021-03-13

## 2021-03-09 VITALS — BODY MASS INDEX: 42.66 KG/M2 | WEIGHT: 315 LBS | HEIGHT: 72 IN

## 2021-03-09 NOTE — PROGRESS NOTES
Pre-op Instructions For Out-Patient Surgery given during phone PAT visit    Medication Instructions:  · Please stop herbs and any supplements now (includes vitamins and minerals) unless taken for a deficiency. · Please contact your surgeon and prescribing physician for pre-op instructions for any blood thinners. ASA, Plavix. · If you have inhalers/aerosol treatments at home, please use them the morning of your surgery and bring the inhalers with you to the hospital. Albuterol nebulizer, Advair, ProAir, Spiriva    · Please take the following medications the morning of your surgery with a sip of water:    Lisinopril, Metoprolol    Surgery Instructions:  1. After midnight before surgery:  Do not eat or drink anything, including water, mints, gum, and hard candy. You may brush your teeth without swallowing. No smoking, chewing tobacco, or street drugs. 2. Please shower or bathe before surgery. 3. Please do not wear any cologne, lotion, powder, deodorant, jewelry, piercings, perfume, makeup, nail polish, hair accessories, or hair spray on the day of surgery. Wear loose comfortable clothing. 4. Leave your valuables at home. Bring a storage case for any glasses/contacts. 5. An adult who is responsible for you MUST drive you home and should be with you for the first 24 hours after surgery. The Day of Surgery:  · Arrive at [address] [place] [address] Surgery Entrance at the time directed by your surgeon and check in at the desk. · If you have a living will or healthcare power of , please bring a copy. · You will be taken to the pre-op holding area where you will be prepared for surgery. A physical assessment will be performed by a nurse practitioner or house officer. Your IV will be started and you will meet your anesthesiologist.    · We are currently limiting visitors to only one designated person in the pre-op holding area.   When you go to surgery, your family will be directed to the surgical waiting room, where the doctor should speak with them after your surgery. · After surgery, you will be taken to the recovery room then when you are awake and stable you will go to the short stay unit for preparation to be discharged. Only your one designated person is allowed to come to short stay for your discharge. · If you use a Bi-PAP or C-PAP machine, please bring it with you and leave it in the car in case it is needed in recovery room.

## 2021-03-09 NOTE — PROGRESS NOTES
Dr. Steve Mora, anesthesia, was contacted and informed of the patient's planned surgery, history and labs from 2/23/2021 at [facility]. Dr Steve Mora was also informed that patient had seen Dr. Acacia Rivera on 2/23/2021. No clearance required.

## 2021-03-09 NOTE — PROGRESS NOTES
Dr. Miranda Rubin, anesthesia, was contacted again and informed of the patient's abnormal EKG results from most recent EKG done on 10/2/2018. No clearance required.

## 2021-03-10 ENCOUNTER — TELEPHONE (OUTPATIENT)
Dept: EMERGENCY DEPT | Age: 56
End: 2021-03-10

## 2021-03-10 NOTE — TELEPHONE ENCOUNTER
Writer spoke with pt today regarding ACP referral to [facility] and Living Will documents from January referral from PCP. Pt stated he is very interested in completing the documents and he wishes to have eldest daughter names as Primary HCDM. Pt requested to have documents mailed to him and I will follow up in a week or two to see if he has any questions. Pt has contact info of writer.

## 2021-03-11 ENCOUNTER — TELEPHONE (OUTPATIENT)
Dept: GASTROENTEROLOGY | Age: 56
End: 2021-03-11

## 2021-03-11 NOTE — TELEPHONE ENCOUNTER
Pt's daughter, Donald Ramsey, called in regarding her dad's covid test date & proc. She states her dad had his cardiologist appt and dr is ordering a stress test before clearance can be given. They are in the process of setting up stress appt and waiting on a call from the hospital to schedule. Writer informed Donald Ramsey we will cancel covid test date & procedure and we will wait to reschedule until after we receive clearance.   Donald Ramsey voices her understanding and is agreeable to plan

## 2021-03-23 ENCOUNTER — PATIENT MESSAGE (OUTPATIENT)
Dept: FAMILY MEDICINE CLINIC | Age: 56
End: 2021-03-23

## 2021-03-23 DIAGNOSIS — M96.1 POSTLAMINECTOMY SYNDROME OF LUMBAR REGION: ICD-10-CM

## 2021-03-23 DIAGNOSIS — M48.061 SPINAL STENOSIS OF LUMBAR REGION WITHOUT NEUROGENIC CLAUDICATION: Primary | ICD-10-CM

## 2021-03-23 DIAGNOSIS — G89.29 CHRONIC MIDLINE LOW BACK PAIN WITH LEFT-SIDED SCIATICA: ICD-10-CM

## 2021-03-23 DIAGNOSIS — M54.42 CHRONIC MIDLINE LOW BACK PAIN WITH LEFT-SIDED SCIATICA: ICD-10-CM

## 2021-03-23 DIAGNOSIS — M48.061 SPINAL STENOSIS OF LUMBAR REGION WITHOUT NEUROGENIC CLAUDICATION: ICD-10-CM

## 2021-03-23 DIAGNOSIS — M51.36 DDD (DEGENERATIVE DISC DISEASE), LUMBAR: ICD-10-CM

## 2021-03-23 RX ORDER — OXYCODONE HYDROCHLORIDE 10 MG/1
10 TABLET ORAL EVERY 8 HOURS PRN
Qty: 90 TABLET | Refills: 0 | Status: SHIPPED | OUTPATIENT
Start: 2021-03-23 | End: 2021-04-20 | Stop reason: SDUPTHER

## 2021-03-23 RX ORDER — OXYCODONE HYDROCHLORIDE 10 MG/1
10 TABLET ORAL EVERY 8 HOURS PRN
Qty: 90 TABLET | Refills: 0 | Status: SHIPPED | OUTPATIENT
Start: 2021-03-23 | End: 2021-03-23 | Stop reason: SDUPTHER

## 2021-03-23 NOTE — TELEPHONE ENCOUNTER
Please Approve or Refuse.   Send to Pharmacy per Pt's Request:      Next Visit Date:  7/20/2021   Last Visit Date: 2/23/2021    Hemoglobin A1C (%)   Date Value   01/20/2021 8.7 (H)   09/25/2020 7.3 (H)   07/13/2020 9.5 (H)             ( goal A1C is < 7)   BP Readings from Last 3 Encounters:   03/04/21 (!) 186/76   02/23/21 130/80   02/05/21 80/60          (goal 120/80)  BUN   Date Value Ref Range Status   02/23/2021 44 (H) 6 - 20 mg/dL Final     CREATININE   Date Value Ref Range Status   02/23/2021 1.94 (H) 0.70 - 1.20 mg/dL Final     Potassium   Date Value Ref Range Status   02/23/2021 4.6 3.7 - 5.3 mmol/L Final

## 2021-03-23 NOTE — TELEPHONE ENCOUNTER
From: Rhonda Wood.   To: Dirk Ha MD  Sent: 3/23/2021 4:02 PM EDT  Subject: Non-Urgent Medical Question    Will you please Refill my pain meds oxycodone 10 mg one ever 8 hours thank you so much

## 2021-03-25 ENCOUNTER — HOSPITAL ENCOUNTER (OUTPATIENT)
Dept: NON INVASIVE DIAGNOSTICS | Age: 56
Discharge: HOME | End: 2021-03-25
Payer: COMMERCIAL

## 2021-03-25 ENCOUNTER — HOSPITAL ENCOUNTER (OUTPATIENT)
Dept: NUCLEAR MEDICINE | Age: 56
Discharge: HOME | End: 2021-03-27
Payer: COMMERCIAL

## 2021-03-25 DIAGNOSIS — Z01.810 ENCOUNTER FOR PRE-OPERATIVE CARDIOVASCULAR CLEARANCE: ICD-10-CM

## 2021-03-25 DIAGNOSIS — R94.31 ABNORMAL EKG: ICD-10-CM

## 2021-03-25 DIAGNOSIS — R06.09 OTHER FORM OF DYSPNEA: ICD-10-CM

## 2021-03-25 DIAGNOSIS — R07.89 OTHER CHEST PAIN: ICD-10-CM

## 2021-03-25 LAB
LV EF: 53 %
LVEF MODALITY: NORMAL

## 2021-03-25 PROCEDURE — 2580000003 HC RX 258: Performed by: INTERNAL MEDICINE

## 2021-03-25 PROCEDURE — 3430000000 HC RX DIAGNOSTIC RADIOPHARMACEUTICAL: Performed by: INTERNAL MEDICINE

## 2021-03-25 PROCEDURE — 78452 HT MUSCLE IMAGE SPECT MULT: CPT

## 2021-03-25 PROCEDURE — 6360000004 HC RX CONTRAST MEDICATION: Performed by: INTERNAL MEDICINE

## 2021-03-25 PROCEDURE — C8929 TTE W OR WO FOL WCON,DOPPLER: HCPCS

## 2021-03-25 PROCEDURE — A9500 TC99M SESTAMIBI: HCPCS | Performed by: INTERNAL MEDICINE

## 2021-03-25 RX ORDER — SODIUM CHLORIDE 0.9 % (FLUSH) 0.9 %
10 SYRINGE (ML) INJECTION PRN
Status: DISCONTINUED | OUTPATIENT
Start: 2021-03-25 | End: 2021-03-28 | Stop reason: HOSPADM

## 2021-03-25 RX ADMIN — PERFLUTREN 2.2 MG: 6.52 INJECTION, SUSPENSION INTRAVENOUS at 13:40

## 2021-03-25 RX ADMIN — TETRAKIS(2-METHOXYISOBUTYLISOCYANIDE)COPPER(I) TETRAFLUOROBORATE 36.2 MILLICURIE: 1 INJECTION, POWDER, LYOPHILIZED, FOR SOLUTION INTRAVENOUS at 13:18

## 2021-03-25 RX ADMIN — Medication 10 ML: at 13:18

## 2021-03-26 ENCOUNTER — HOSPITAL ENCOUNTER (OUTPATIENT)
Dept: NON INVASIVE DIAGNOSTICS | Age: 56
Discharge: HOME | End: 2021-03-26
Payer: COMMERCIAL

## 2021-03-26 ENCOUNTER — HOSPITAL ENCOUNTER (OUTPATIENT)
Dept: NUCLEAR MEDICINE | Age: 56
Discharge: HOME | End: 2021-03-28
Payer: COMMERCIAL

## 2021-03-26 LAB
LV EF: 46 %
LVEF MODALITY: NORMAL

## 2021-03-26 PROCEDURE — 93017 CV STRESS TEST TRACING ONLY: CPT

## 2021-03-26 PROCEDURE — A9500 TC99M SESTAMIBI: HCPCS

## 2021-03-26 PROCEDURE — 3430000000 HC RX DIAGNOSTIC RADIOPHARMACEUTICAL: Performed by: INTERNAL MEDICINE

## 2021-03-26 PROCEDURE — 2580000003 HC RX 258: Performed by: INTERNAL MEDICINE

## 2021-03-26 PROCEDURE — 78452 HT MUSCLE IMAGE SPECT MULT: CPT

## 2021-03-26 PROCEDURE — 6360000002 HC RX W HCPCS: Performed by: INTERNAL MEDICINE

## 2021-03-26 PROCEDURE — 3430000000 HC RX DIAGNOSTIC RADIOPHARMACEUTICAL

## 2021-03-26 PROCEDURE — A9500 TC99M SESTAMIBI: HCPCS | Performed by: INTERNAL MEDICINE

## 2021-03-26 RX ORDER — AMINOPHYLLINE DIHYDRATE 25 MG/ML
50 INJECTION, SOLUTION INTRAVENOUS PRN
Status: ACTIVE | OUTPATIENT
Start: 2021-03-26 | End: 2021-03-26

## 2021-03-26 RX ORDER — ALBUTEROL SULFATE 90 UG/1
2 AEROSOL, METERED RESPIRATORY (INHALATION) PRN
Status: ACTIVE | OUTPATIENT
Start: 2021-03-26 | End: 2021-03-26

## 2021-03-26 RX ORDER — ATROPINE SULFATE 0.1 MG/ML
0.5 INJECTION INTRAVENOUS EVERY 5 MIN PRN
Status: ACTIVE | OUTPATIENT
Start: 2021-03-26 | End: 2021-03-26

## 2021-03-26 RX ORDER — NITROGLYCERIN 0.4 MG/1
0.4 TABLET SUBLINGUAL EVERY 5 MIN PRN
Status: ACTIVE | OUTPATIENT
Start: 2021-03-26 | End: 2021-03-26

## 2021-03-26 RX ORDER — METOPROLOL TARTRATE 5 MG/5ML
5 INJECTION INTRAVENOUS EVERY 5 MIN PRN
Status: ACTIVE | OUTPATIENT
Start: 2021-03-26 | End: 2021-03-26

## 2021-03-26 RX ORDER — SODIUM CHLORIDE 0.9 % (FLUSH) 0.9 %
10 SYRINGE (ML) INJECTION PRN
Status: ACTIVE | OUTPATIENT
Start: 2021-03-26 | End: 2021-03-26

## 2021-03-26 RX ORDER — SODIUM CHLORIDE 9 MG/ML
500 INJECTION, SOLUTION INTRAVENOUS CONTINUOUS PRN
Status: ACTIVE | OUTPATIENT
Start: 2021-03-26 | End: 2021-03-26

## 2021-03-26 RX ADMIN — SODIUM CHLORIDE, PRESERVATIVE FREE 10 ML: 5 INJECTION INTRAVENOUS at 08:16

## 2021-03-26 RX ADMIN — REGADENOSON 0.4 MG: 0.08 INJECTION, SOLUTION INTRAVENOUS at 09:01

## 2021-03-26 RX ADMIN — TETRAKIS(2-METHOXYISOBUTYLISOCYANIDE)COPPER(I) TETRAFLUOROBORATE 36.1 MILLICURIE: 1 INJECTION, POWDER, LYOPHILIZED, FOR SOLUTION INTRAVENOUS at 09:04

## 2021-03-26 NOTE — PROGRESS NOTES
CST Lexiscan. Stress Tech performs patient preparation of physical comfort, review test procedures, pre-stress EKG. Lung Sounds clear t/o. Consent verified by pt. .  Educated patient on test procedure an possible side effects of Lexiscan as well as s/s to report. Cardiologist reviewed pre-test EKG and is present for test.  Patient tolerated test well with minor SOB, which resolved to baseline after test with caffeine. Start /57 HR 79  Stop /67 HR 86  EKG portion of testing is completed with uninterpretable EKG changes, nuc. med. portion is still pending.

## 2021-03-29 NOTE — PROCEDURES
[address] [facility]                    [facility]. [address]                              CARDIAC STRESS TEST    PATIENT NAME: Sherlyn Dang                    :        1965  MED REC NO:   [id number]                              ROOM:  ACCOUNT NO:   [de-identified]                           ADMIT DATE: 2021  PROVIDER:     Leldon Mortimer    DATE OF STUDY:  2021    TEST TYPE: LEXISCAN CARDIOLYTE STRESS TEST  INDICATION: ABNORMAL EKG  REFERRING PHYSICIAN: ROSA MARIA CAVAZOS DO    RESTING HEART RATE: 79 BEATS PER MINUTE  RESTING BLOOD PRESSURE:  120/57    MEDICATION(S) GIVEN: 0.4MG IV LEXISCAN  REASON FOR TERMINATION: MEDICATION INFUSION COMPLETE    RESTING EKG: ABNORMAL, CONDUCTION ABNORMALITY  STRESS HEART RESPONSE: NORMAL RESPONSE  BLOOD PRESSURE RESPONSE: APPROPRIATE  STRESS EKGs: NO CHANGES SEEN  CHEST DISCOMFORT: NO PAIN DURING STRESS  ISCHEMIC EKG CHANGES: UNINTERPRETABLE    EKG IMPRESSION: ELECTROCARDIOGRAPHICALLY UNINTERPRETABLE LEXISCAN STRESS  TEST. RADIOISOTOPE RESULTS TO FOLLOW FROM THE DEPARTMENT OF NUCLEAR  MEDICINE.     Romana Shropshire    D: 2021 13:24:11       T: 2021 13:25:26     /DEVIN  Job#: [id number]     Doc#: Unknown    CC:    (Retain this field even if not dictated or not decipherable)

## 2021-03-30 ENCOUNTER — TELEPHONE (OUTPATIENT)
Dept: SPIRITUAL SERVICES | Age: 56
End: 2021-03-30

## 2021-03-30 ENCOUNTER — TELEPHONE (OUTPATIENT)
Dept: PHARMACY | Age: 56
End: 2021-03-30

## 2021-03-30 NOTE — TELEPHONE ENCOUNTER
Spoke with patient for COVID 19 screening secondary to upcoming appointment tomorrow . At this time patient denies symptoms, recent (previous 14 days) positive covid test, recent travel and exposure. Patient will report to [facility] at scheduled time. Patient educated to call physician or go to ER with respiratory symptoms or fever and to not present to appointment if symptomatic. Will coordinate for next appointment accordingly.      Alfredo Metzger, PharmD, 3/30/2021 3:44 PM

## 2021-03-30 NOTE — TELEPHONE ENCOUNTER
[address] [facility] [place] Clinical Specialist  Conversation Note      Date of ACP Conversation: 3/30/2021    Conversation Conducted with: Patient with Decision Making Capacity    ACP Clinical Specialist: Walter Bull called patient in regards to ACP. The patient said he already has the paperwork. He said he already has talked to someone about it and is not ready to fill them out at this time. The patient said he will call the number on the paperwork if he changes his mind and wants to fill out the paperwork.        Follow-up plan:    [] Schedule follow-up conversation to continue planning  [] Referred individual to Provider for additional questions/concerns   [] Advised patient/agent/surrogate to review completed ACP document and update if needed with changes in condition, patient preferences or care setting    [x] This note routed to one or more involved healthcare providers          [unfilled]

## 2021-03-31 ENCOUNTER — HOSPITAL ENCOUNTER (OUTPATIENT)
Dept: PHARMACY | Age: 56
Setting detail: THERAPIES SERIES
Discharge: HOME | End: 2021-03-31
Payer: COMMERCIAL

## 2021-03-31 ENCOUNTER — TELEPHONE (OUTPATIENT)
Dept: PHARMACY | Age: 56
End: 2021-03-31

## 2021-03-31 ENCOUNTER — TELEPHONE (OUTPATIENT)
Dept: FAMILY MEDICINE CLINIC | Age: 56
End: 2021-03-31

## 2021-03-31 VITALS — TEMPERATURE: 96.6 F

## 2021-03-31 DIAGNOSIS — E78.5 HYPERLIPIDEMIA WITH TARGET LDL LESS THAN 70: ICD-10-CM

## 2021-03-31 DIAGNOSIS — F33.1 MODERATE EPISODE OF RECURRENT MAJOR DEPRESSIVE DISORDER (HCC): ICD-10-CM

## 2021-03-31 DIAGNOSIS — G89.29 CHRONIC BACK PAIN GREATER THAN 3 MONTHS DURATION: ICD-10-CM

## 2021-03-31 DIAGNOSIS — M54.9 CHRONIC BACK PAIN GREATER THAN 3 MONTHS DURATION: ICD-10-CM

## 2021-03-31 DIAGNOSIS — L73.2 AXILLARY HIDRADENITIS SUPPURATIVA: Primary | ICD-10-CM

## 2021-03-31 PROCEDURE — 99213 OFFICE O/P EST LOW 20 MIN: CPT

## 2021-03-31 RX ORDER — TIZANIDINE 4 MG/1
TABLET ORAL
Qty: 90 TABLET | Refills: 5 | Status: SHIPPED
Start: 2021-03-31 | End: 2021-10-14 | Stop reason: HOSPADM

## 2021-03-31 RX ORDER — CEPHALEXIN 500 MG/1
500 CAPSULE ORAL EVERY 6 HOURS
Qty: 40 CAPSULE | Refills: 0 | Status: SHIPPED | OUTPATIENT
Start: 2021-03-31 | End: 2021-04-05 | Stop reason: ALTCHOICE

## 2021-03-31 RX ORDER — PRAVASTATIN SODIUM 40 MG
40 TABLET ORAL NIGHTLY
Qty: 90 TABLET | Refills: 3 | Status: SHIPPED
Start: 2021-03-31 | End: 2021-05-21 | Stop reason: ALTCHOICE

## 2021-03-31 RX ORDER — ESCITALOPRAM OXALATE 10 MG/1
10 TABLET ORAL DAILY
Qty: 90 TABLET | Refills: 3 | Status: SHIPPED | OUTPATIENT
Start: 2021-03-31 | End: 2021-06-14 | Stop reason: SDUPTHER

## 2021-03-31 RX ORDER — TOBRAMYCIN AND DEXAMETHASONE 3; 1 MG/ML; MG/ML
5 SUSPENSION/ DROPS OPHTHALMIC 2 TIMES DAILY
COMMUNITY
End: 2021-04-25 | Stop reason: ALTCHOICE

## 2021-03-31 RX ORDER — NYSTATIN 100000 [USP'U]/G
POWDER TOPICAL
Qty: 1 BOTTLE | Refills: 3 | Status: SHIPPED | OUTPATIENT
Start: 2021-03-31 | End: 2022-03-02 | Stop reason: SDUPTHER

## 2021-03-31 NOTE — TELEPHONE ENCOUNTER
Rec'd clearance from [facility] to hold Plavix/ASA x 5 days. Called pt and LVM to schedule colon. He will need 2 day prep.

## 2021-03-31 NOTE — TELEPHONE ENCOUNTER
Refill of pravastatin 40mg once a day  #90 sent to [facility] on [facility]. Yesenia Rizvi,Pharm. D,, BCPS, CACP  3/31/2021  1:56 PM

## 2021-03-31 NOTE — TELEPHONE ENCOUNTER
----- Message from Janki Maloney [facility] sent at 3/31/2021  2:33 PM EDT -----  Regarding: Patient issues  Tico,  I saw Cirilo Chairez today and wanted to make you aware that he states his infections in his armpits are starting to get out of control again. I wasn't sure if you wanted him to come in for evaluation or start on another course of antibiotics? He also indicated he is in need of refills on his lexapro and tizanidine.     Thank you,  Yesenia

## 2021-03-31 NOTE — TELEPHONE ENCOUNTER
Please let the patient know to  prescription from the pharmacy and give him contact info for dermatology, we might need to fax the referral      If worsening symptoms in few days or not getting better as expected needs to let us know and make appointment. Orders Placed This Encounter   Medications    nystatin (MYCOSTATIN) 886392 UNIT/GM powder     Sig: Apply 2 times daily in the skin folds for several months     Dispense:  1 Bottle     Refill:  3    cephALEXin (KEFLEX) 500 MG capsule     Sig: Take 1 capsule by mouth every 6 hours     Dispense:  40 capsule     Refill:  0    escitalopram (LEXAPRO) 10 MG tablet     Sig: Take 1 tablet by mouth daily     Dispense:  90 tablet     Refill:  3    tiZANidine (ZANAFLEX) 4 MG tablet     Sig: TAKE ONE TABLET BY MOUTH EVERY 8 HOURS AS NEEDED FOR BACK PAIN     Dispense:  90 tablet     Refill:  [address] [phone] Joe Hays [phone]  [address] [place] [address]  Phone: [phone] Fax: [phone]      Orders Placed This Encounter   Procedures   Shari Nichols MD Dermatology, [place]     Referral Priority:   Routine     Referral Type:   Eval and Treat     Referral Reason:   Specialty Services Required     Referred to Provider:   Hardik Argueta MD     Requested Specialty:   Dermatology     Number of Visits Requested:   1           Thank you!

## 2021-03-31 NOTE — PROGRESS NOTES
Diabetic Medication Management   [facility]    [address] [facility]. [place], [address] [place] [facility] [place]  Phone: [phone]  Fax: [phone]    NAME: Mariela Jennings. MEDICAL RECORD NUMBER:  [id number]  AGE: 63 y.o. GENDER: male  : 1965  EPISODE DATE:  3/31/2021       Mr. Shaneka Sharif was referred to [facility] Medication Management Services by Dr. Jocelyne Chandler, Special instructions include: titrate all medications (as defined in clinic's policy and procedure)    Patient seen in office. Goals per referral:   Fasting blood glucose: < 130  Peak postprandial glucose: < 180  A1C: < 7    Other goals:  Blood pressure goal: 130/80  Weight loss goal (~10%): Target weight  410 to be reached by date: 2021 (3-6 months)  Physical Activity goal:    10 minutes three times per week to be reached by date: 2021 (3-6 months)  Smoking Cessation   Quit Date: Stopped 20. Cholesterol at target:   Date: Yes LDL 82  HDL 33 Trig 180 (20)  Annual eye exam:    Date: Has eye exam scheduled week of 21  Comprehensive annual foot exam:   Date: No due to covid. Will call to schedule  Annual urine Albumin and serum creatinine:   Date:  microalbumin <12 mg/L (21), SrCr 1.83 mg/dL (21)        Subjective   Mr. Shaneka Sharif is a 63 y.o. male here for the Diabetes Service for self-management education, medication review including over the counter medications and herbal products, overall well-being assessment, transition of care and any needed adjustments with updates and recommendations communicated to the referring physician. Patient's name and  verified. Patient Findings:   Patient needs GI work up to investigate GI complaints but was not able to get clearance from cardiologist. Patient states his stomach pain has been very sore that he can not eat. When eats pain is worse. Patient notes that there is not any type of food or time of day that is better.       Patient complaining of SOB and chest pain which has been constant and not any different today than has been recently. Has been following with cardiologist with next appt on 4/2. Had stress test on 3/25 and 3/26 and will get results at appt on 4/2. Patient did not take insulin on 3/24-3/26 as he was told to hold several medications for stress test and didn't want to screw it up so did not take any medications. Patient did see ENT who ordered a hearing test patient which patient completed on 3/29. Patient states had quite a bit of deficiencies in hearing. Patient states a culture of ear was done and started antibiotic ear drops yesterday 3/30. Review of CareEverywhere indicates culture grew pseudomonas. Patient acknowledges he will need hearing aids also. Patient admits to concern over taking insulin since he is not eating much. Reports skipping more than taking his insulin in last few days and prior to stress test taking twice a day rather than three times a day most often skipping lunch insulin. Review of blood sugars prior to stress test when patient was taking most insulin doses and not eating well and no hypoglycemia noted. Discussed how patient will require insulin even if not eating as much and how pain/stress can possibly increase insulin requirements. Patient agrees to resume taking his insulin and will call with any glucose values below 90. Patient reports 2-5 times a day dizzy spells associated with headaches. He is still taking amitriptyline but it is not working to control his symptoms. Been worse since fall hitting head in Dec.  Admits evaluation such as MRI delayed due to other health issues. Follow up with Radha Gaytan next month. Patient reports skin infections in arm pit areas were under control but seem to have gotten worse starting 3/29. Has been putting ointment and cream on as instructed. Will inform Glenys Muller for her consideration. Patient taking furosemide once a day rather than twice.  Weighs himself daily 4 of 7 days. Weight is on downward trend. Taking metolazone twice a week. Patient states left hand and legs tend to swell when holding water and this has not been happening. Discussed how increase in edema can result in SOB and encouraged patient to continue to monitor. Patient forgot about our discussion of fenofibrate from last appt. Patient was taking along time ago and had RX bottle with faded label. Patient has to call Dr. Gilberto June to make follow up appt and will ask if he would like him to resume. Last triglyceride level in Sept 2020 was 180. Patient thinks he was on fenofibrate at that time. Patient finished Chantix about 3 days ago and has not smoked since last appt. Patient notes has not had any desire to smoke since stopping Chantix. Was even around his son that was smoking and he did not want to smoke. Patient forgot to bring in inhalers but verbally described how he is using inhalers and appears to be using correctly. Using spacer with all inhalers and using nebullizer about 5 times per day. Patient has blood work for Vitamin B12 active in [place]. Unsure when patient will get labs done. Patient has enough diabetes supplies and insulin. Will call if needs any refills. Patient in need of pravastatin refill which is sent to [facility] on [address] [facility] [address]. Patient also states he needs refills on tizanidine and lexapro. Dr. Chong Robison informed of this.     []  Missed doses   []  Emergency Room Visit    [x]  Medication changes  []  Hospitalization   [x]  Diet changes   [x]  Acute illness   []  Activity changes      Symptoms of hypoglycemia    [x]  None    []  Shakiness    []  Lightheadedness or dizziness   []  Confusion      []  Sweating   []  Other        Symptoms of hyperglycemia -.    [x]  None   []  Frequent urination    []  Increased thirst   []  Other    Medication adverse reactions (none due to diabetic medicaitons)   [x]  None   []  Diarrhea / Nausea / Vomiting / Constipation / flatulence   []  Hypertension   []  Peripheral edema     []  Signs of infection   []  Headache   []  Vision changes   []  Increased cholesterol    []  Weight gain   []  Change in renal function   []  Increased potassium  []  Other      Comments:  Patient takes Humulin R U500. Patient has been taking Humulin R U500 0.52ml with breakfast, 0.52ml with lunch and 0.45 with dinner.      If recent hospital admission / ED visit, was this related to Diabetes No:Fall Discharge date 11/9/20    Objective     PMHx:    Past Medical History:   Diagnosis Date    Acute on chronic kidney failure ([address] 7/20/2017    Acute on chronic respiratory failure (HCC) 10/2/2018    Adhesive capsulitis of left shoulder 3/25/2017    Anxiety 10/02/2016    smokes marijuana for this    Arthropathy, unspecified, other specified sites 6/13/2013    Asthma     B12 deficiency     Bilateral lower leg cellulitis 2/17/2016    Blood in stool     CAD (coronary artery disease)     Cellulitis of both lower extremities 5/25/2017    Cellulitis of leg, left 07/20/2017    CHF (congestive heart failure), NYHA class III ([facility]) 8/14/2013    Chronic back pain     Chronic bronchitis ([facility])     Chronic headaches     was referred to neuro, testing scheduled    Chronic respiratory failure ([address]     was on vent    Chronic ulcer of left leg, with fat layer exposed ([address] 02/22/2019    healed    Class 2 severe obesity due to excess calories with serious comorbidity and body mass index (BMI) of 35.0 to 35.9 in adult ([address]     (BMI 35.0-39.9 without comorbidity)    COPD exacerbation ([address] 11/2/2016    Diabetic neuropathy ([address] 8/14/2013    Displacement of lumbar intervertebral disc without myelopathy 6/13/2013    Ear infection     RIGHT    Essential hypertension     Facial cellulitis 2012    Fall 3/25/2017    GERD (gastroesophageal reflux disease)     Head injury     Hearing loss in right ear     pencil pierced ear as a child    Hepatic steatosis 12/3/2015    History of general anesthesia complication     has woke up during surgery under anesthesia    History of rib fracture 12/3/2015    Chronic     Hyperlipidemia     Hypersomnia     can go multiple days without sleeping    Hypertension     Insomnia     Intolerance of continuous positive airway pressure (CPAP) ventilation 7/20/2017    Iron deficiency     Localized rash     gets frequently in axilla, groin, in any fold, on several topical treatments for this    Magnesium deficiency     Mastoiditis of left side     Mixed conductive and sensorineural hearing loss of both ears 1/10/2017    Per ENT    Mixed type COPD (chronic obstructive pulmonary disease) ([address]     On home O2, multiple inhlaers, nebulizer    Moderate recurrent major depression ([address] 10/2/2016    Morbid obesity with BMI of 45.0-49.9, adult ([address] 6/16/2015    On home oxygen therapy     3 Lpm prn    Open wound of groin 12/19/2018    healed     BARBY on CPAP     Osteoarthritis     Otitis externa of left ear     Pancreatitis chronic     Persistent depressive disorder 11/19/2019    Renal insufficiency     proteinuria    Severe depression ([address] 9/25/2013    Spinal stenosis of lumbar region without neurogenic claudication 1/6/2016    MRI lumbar 12/30/15 L3-L4: There is broad-based bulging disc which appears protruding left laterally causing flattening of the ventral thecal sac. In addition, there is facet arthropathy with mild hypertrophic changes.  There is borderline central canal stenosis with  evidence of moderate left neural foraminal narrowing and mild right neural foramina narrowing.   L4-L5: There is broad-based protrud    Syncope 4/28/2017    Tinnitus of both ears 1/10/2017    Per ENT    Type 2 diabetes mellitus with stage 3 chronic kidney disease, with long-term current use of insulin ([address] 12/26/2016    due to underlying condition with hyperosmolarity without coma    Type II or unspecified type diabetes mellitus without mention of complication, not stated as uncontrolled     uncontrolled    Vitamin D deficiency     Wears glasses     for reading       Social History:    Social History     Tobacco Use    Smoking status: Former Smoker     Packs/day: 0.25     Years: 33.00     Pack years: 8.25     Types: Cigarettes     Start date: 1985     Quit date: 2020     Years since quittin.4    Smokeless tobacco: Former User     Types: Snuff     Quit date: 1995   Substance Use Topics    Alcohol use: No     Alcohol/week: 0.0 standard drinks       Pertinent Labs:    Lab Results   Component Value Date    LABA1C 8.7 (H) 2021    LABA1C 7.3 (H) 2020    LABA1C 9.5 (H) 2020     Lab Results   Component Value Date    CHOL 151 2020    TRIG 180 (H) 2020    HDL 33 (L) 2020     Lab Results   Component Value Date    CREATININE 1.94 (H) 2021    BUN 44 (H) 2021     2021    K 4.6 2021     2021    CO2 25 2021     Lab Results   Component Value Date    ALT 18 2021         Wt Readings from Last 3 Encounters:   21 (!) 426 lb (193.2 kg)   21 (!) 429 lb (194.6 kg)   21 (!) 420 lb (190.5 kg)       Current medications:  Prior to Admission medications    Medication Sig Start Date End Date Taking? Authorizing Provider   tobramycin-dexamethasone (TOBRADEX) 0.3-0.1 % ophthalmic suspension 5 drops 2 times daily Right ear   Yes Historical Provider, MD   pravastatin (PRAVACHOL) 40 MG tablet Take 1 tablet by mouth nightly 3/31/21   Erika Seay MD   oxyCODONE HCl (OXY-IR) 10 MG immediate release tablet Take 1 tablet by mouth every 8 hours as needed for Pain for up to 30 days. 3/23/21 4/22/21  Paige Bryant MD   bumetanide (BUMEX) 1 MG tablet TAKE THREE TABLETS BY MOUTH TWICE A DAY 3/8/21   Erika Seay MD   polyethylene glycol (MIRALAX) 17 GM/SCOOP powder Use as Directed per instructions provided by physician office.  3/5/21 Sandoval Baez MD   bisacodyl (BISACODYL) 5 MG EC tablet TAKE 2 TABS THE DAY BEFORE COLONOSCOPY AS DIRECTED ON BOWEL PREP INSTRUCTIONS GIVEN BY PHYSICIAN OFFICE 3/5/21   Sandoval Baez MD   spironolactone (ALDACTONE) 50 MG tablet Take 1 tablet by mouth daily 2/24/21   Roxi Esteban MD   docusate sodium (STOOL SOFTENER) 100 MG capsule Take 1 capsule by mouth 2 times daily 2/24/21   Roxi Esteban MD   vitamin D3 (CHOLECALCIFEROL) 25 MCG (1000 UT) TABS tablet Take 1 tablet by mouth daily 2/24/21   Roxi Esteban MD   ammonium lactate (LAC-HYDRIN) 12 % lotion Apply topically daily. For dry skin 2/23/21   Roxi Esteban MD   mupirocin (BACTROBAN) 2 % ointment Apply topically 2 times daily on the affected area for 7-10 days. OK to substitute to cream 2/23/21   Roxi Esteban MD   amitriptyline (ELAVIL) 25 MG tablet Take 1 po qhs x 2 days then 2 po qhs 2/5/21   Kristen Garcia MD   nystatin (MYCOSTATIN) 702706 UNIT/GM powder Apply 1-2 times daily in the skin folds. 1/27/21   Roxi Esteban MD   clobetasol (TEMOVATE) 0.05 % ointment Apply topically 2 times daily for psoriasis 1/27/21   Roxi Esteban MD   varenicline (CHANTIX CONTINUING MONTH GUALBERTO) 1 MG tablet Take 1 tablet by mouth 2 times daily 1/20/21   Roxi Esteban MD   ketoconazole (NIZORAL) 2 % cream Apply twice a day for yeast infection in the skin folds, for 4 weeks 1/20/21   Roxi Esteban MD   insulin regular human (HUMULIN R) 500 UNIT/ML concentrated injection vial Patient using 0.52ml with breakfast, 0.52ml with lunch and 0.45ml with dinner.  1/20/21   Theodora Lin MD   albuterol (PROVENTIL) (2.5 MG/3ML) 0.083% nebulizer solution Take 3 mLs by nebulization every 6 hours as needed for Wheezing or Shortness of Breath 1/15/21   Roxi Esteban MD   pantoprazole (PROTONIX) 40 MG tablet Take 1 tablet by mouth every morning (before breakfast) 1/13/21   Roxi Esteban MD   Ferrous Sulfate 7/13/20   Demian Appiah MD   tiZANidine (ZANAFLEX) 4 MG tablet TAKE ONE TABLET BY MOUTH EVERY 8 HOURS AS NEEDED FOR BACK PAIN 7/8/20   Demian Appiah MD   escitalopram (LEXAPRO) 10 MG tablet Take 1 tablet by mouth daily 6/17/20   Demian Appiah MD   pravastatin (PRAVACHOL) 40 MG tablet Take 1 tablet by mouth nightly 5/20/20 3/31/21  Demian Appiah MD   Oxygen Tubing MISC by Does not apply route DX COPD. chronic respiratory failure 3/26/20   Demian Appiah MD   Respiratory Therapy Supplies (NEBULIZER/TUBING/MOUTHPIECE) KIT Dx COPD needs nebulizer supplies 2/20/20   Demian Appiah MD   nitroGLYCERIN (NITROSTAT) 0.4 MG SL tablet Place 1 tablet under the tongue every 5 minutes as needed for Chest pain (and call 911) 2/13/20   Demian Appiah MD   ONE TOUCH ULTRASOFT LANCETS [address] [facility] Patient to test blood sugar up to 4 times daily. 11/7/19   Harmeet Rivers MD   Lancet Devices (LANCING DEVICE) MISC Provide patient with lancing device appropriate for his machine/lancing needles. 6/4/19   Demian Appiah MD   Lidocaine 4 % LOTN Apply topically    Historical Provider, MD   Spacer/Aero Chamber Mouthpiece MISC 1 each by Does not apply route once as needed (to be used with his inhalers) 4/15/19 2/5/21  Demian Appiah MD   metolazone (ZAROXOLYN) 2.5 MG tablet Take 2.5 mg by mouth three times a week MWF    Historical Provider, MD   PROAIR  (90 Base) MCG/ACT inhaler INHALE TWO PUFFS BY MOUTH EVERY 6 HOURS AS NEEDED FOR WHEEZING OR SHORTNESS OF BREATH 3/27/19   Paula Bello MD   Handicap Placard MISC by Does not apply route Can't walk greater than 200 feet. Expires in 5 years.  2/13/19   Paige Bryant MD   fluticasone (CUTIVATE) 0.05 % cream Apply topically 2 times daily  4/19/17   Historical Provider, MD   fluticasone (FLONASE) 50 MCG/ACT nasal spray 2 sprays by Nasal route daily  Patient taking differently: 2 sprays by Nasal route daily as needed (sinus symptoms)  1/16/17 Rosa Brooks MD   Melatonin 10 MG TABS Take 10 mg by mouth nightly as needed (insomnia) 12/23/16   Rosa Brooks MD   aspirin 81 MG EC tablet Take 81 mg by mouth daily. Historical Provider, MD       Immunizations:   Most Recent Immunizations   Administered Date(s) Administered    DT (pediatric) 12/14/1998    Hepatitis B Adult (Engerix-B) 12/04/2019    Hepatitis B Adult (Recombivax HB) 12/04/2019    Influenza Virus Vaccine 10/12/2015    Influenza, Quadv, IM, PF (6 mo and older Fluzone, Flulaval, Fluarix, and 3 yrs and older Afluria) 10/09/2020    Pneumococcal Polysaccharide ([id number]) 05/23/2013    Tdap (Boostrix, Adacel) 09/12/2018       Home Blood Glucose Results:   Patient brought in glucose log. Fasting: (newest to oldest)   251,124,140,177,247,102,100,103,106,100,102,99,100,103,105,101,99,98,100,101,103,100,101      Before lunch (newest to oldest)  180,171,210,200,145,147,150,142,150,148,140,144,142,139,136,134,138,133,138,135,137,      Before dinner (newest to oldest)  [id number]    Bold numbers are glucose values since stress test when patient admits to not taking much insulin at all. Overall blood glucose prior to stress test was controlled. Assessment     A1c at goal:No:  8.7 (1/20/21) (increasing)  Blood Pressure at goal: elevated at last PCP visit  Weight at goal: No:    Physical activity: No  Physical activity at goal: No  Patient encouraged but unable to due SOB. Smoking Cessation: Yes    Cholesterol at target:  Yes  Annual eye exam completed: No - scheduled for next week  Comprehensive Foot Exam Completed: No - TBD  Annual urine albumin and serum creatinine: Yes    Statin: Yes    Appropriate?: Yes  Changes made: refill provided    ACE/ARB: Yes  Appropriate?: Yes  Changes made:     Aspirin: Yes  Appropriate?: Yes  Changes made:     Eating patterns:    [x]  My plate    []  Mediterranean diet   []

## 2021-03-31 NOTE — TELEPHONE ENCOUNTER
Pt's daughter returned ofc phone call to resched cancelled colon proc. Pt is resched w/ [place] at [facility] 4/12/21 @ 8:30am proc time, 6:30am arrival time. Pt has bowel prep from prev cancelled proc. New bowel prep dates & times were given to Karen Teague over the phone and hard copy will be e-mailed to [email or web address]. Karen Teague reminded of pt needing a . Covid test is sched at [facility] [place] 4/8 @ 2:40pm. Karen Teague given address & instructed where to report. PAT phone call is sched 4/5 @ 9:15am and Karen Teague is informed pt will receive a call from surgery nurse to go over pre-testing questions. Pt's 4/12/21 f/u OV resched to 5/3/21 @ 1:30 at [facility]; appt reminder sent to pt's e-mail also. Karen Teague voices her understanding.

## 2021-04-05 ENCOUNTER — HOSPITAL ENCOUNTER (OUTPATIENT)
Dept: PREADMISSION TESTING | Age: 56
Discharge: HOME | End: 2021-04-09
Payer: COMMERCIAL

## 2021-04-05 VITALS — WEIGHT: 315 LBS | BODY MASS INDEX: 42.66 KG/M2 | HEIGHT: 72 IN

## 2021-04-05 NOTE — PROGRESS NOTES
Pre-op Instructions For Out-Patient Surgery    Medication Instructions:  · Please stop herbs and any supplements now (includes vitamins and minerals). · Please contact your surgeon and prescribing physician for pre-op instructions for any blood thinners. Aspirin & Plavix    · If you have inhalers/aerosol treatments at home, please use them the morning of your surgery and bring the inhalers with you to the hospital.    · Please take the following medications the morning of your surgery with a sip of water:    Inhalers, Lisinopril, Metoprolol and Pantoprazole    Surgery Instructions:  1. After midnight before surgery:  Do not eat or drink anything, including water, mints, gum, and hard candy. You may brush your teeth without swallowing. No smoking, chewing tobacco, or street drugs. 2. Please shower or bathe before surgery. 3. Please do not wear any cologne, lotion, powder, deodorant, jewelry, piercings, perfume, makeup, nail polish, hair accessories, or hair spray on the day of surgery. Wear loose comfortable clothing. 4. Leave your valuables at home. Bring a storage case for any glasses/contacts. 5. An adult who is responsible for you MUST drive you home and should be with you for the first 24 hours after surgery. 6. If having out-patient knee and foot surgeries, please arrange for planned crutches, walker, or wheelchair before arriving to the hospital.    The Day of Surgery:  · Arrive at [facility] Surgery Entrance at the time directed by your surgeon and check in at the desk. · If you have a living will or healthcare power of , please bring a copy. · You will be taken to the pre-op holding area where you will be prepared for surgery. A physical assessment will be performed by a nurse practitioner or house officer.   Your IV will be started and you will meet your anesthesiologist.    · We are currently limiting visitors to only one designated person in the pre-op holding area. When you go to surgery, your family will be directed to the surgical waiting room, where the doctor should speak with them after your surgery. · After surgery, you will be taken to the recovery room then when you are awake and stable you will go to the short stay unit for preparation to be discharged. Only your one designated person is allowed to come to short stay for your discharge. · If you use a Bi-PAP or C-PAP machine, please bring it with you and leave it in the car in case it is needed in recovery room.

## 2021-04-08 ENCOUNTER — HOSPITAL ENCOUNTER (OUTPATIENT)
Dept: LAB | Age: 56
Setting detail: SPECIMEN
Discharge: HOME | End: 2021-04-08
Payer: COMMERCIAL

## 2021-04-08 DIAGNOSIS — Z01.818 PRE-OP TESTING: Primary | ICD-10-CM

## 2021-04-08 PROCEDURE — U0003 INFECTIOUS AGENT DETECTION BY NUCLEIC ACID (DNA OR RNA); SEVERE ACUTE RESPIRATORY SYNDROME CORONAVIRUS 2 (SARS-COV-2) (CORONAVIRUS DISEASE [COVID-19]), AMPLIFIED PROBE TECHNIQUE, MAKING USE OF HIGH THROUGHPUT TECHNOLOGIES AS DESCRIBED BY CMS-2020-01-R: HCPCS

## 2021-04-08 PROCEDURE — U0005 INFEC AGEN DETEC AMPLI PROBE: HCPCS

## 2021-04-09 ENCOUNTER — OFFICE VISIT (OUTPATIENT)
Dept: NEUROLOGY | Age: 56
End: 2021-04-09
Payer: COMMERCIAL

## 2021-04-09 ENCOUNTER — ANESTHESIA EVENT (OUTPATIENT)
Dept: ENDOSCOPY | Age: 56
End: 2021-04-09
Payer: COMMERCIAL

## 2021-04-09 VITALS
DIASTOLIC BLOOD PRESSURE: 90 MMHG | BODY MASS INDEX: 42.66 KG/M2 | WEIGHT: 315 LBS | SYSTOLIC BLOOD PRESSURE: 150 MMHG | TEMPERATURE: 97.2 F | HEIGHT: 72 IN

## 2021-04-09 DIAGNOSIS — E08.42 DIABETIC POLYNEUROPATHY ASSOCIATED WITH DIABETES MELLITUS DUE TO UNDERLYING CONDITION (HCC): ICD-10-CM

## 2021-04-09 DIAGNOSIS — G89.29 CHRONIC LOW BACK PAIN WITH SCIATICA, SCIATICA LATERALITY UNSPECIFIED, UNSPECIFIED BACK PAIN LATERALITY: ICD-10-CM

## 2021-04-09 DIAGNOSIS — R51.9 CHRONIC DAILY HEADACHE: Primary | ICD-10-CM

## 2021-04-09 DIAGNOSIS — M54.40 CHRONIC LOW BACK PAIN WITH SCIATICA, SCIATICA LATERALITY UNSPECIFIED, UNSPECIFIED BACK PAIN LATERALITY: ICD-10-CM

## 2021-04-09 DIAGNOSIS — R56.9 SEIZURES (HCC): ICD-10-CM

## 2021-04-09 PROCEDURE — 2022F DILAT RTA XM EVC RTNOPTHY: CPT | Performed by: PSYCHIATRY & NEUROLOGY

## 2021-04-09 PROCEDURE — 99214 OFFICE O/P EST MOD 30 MIN: CPT | Performed by: PSYCHIATRY & NEUROLOGY

## 2021-04-09 PROCEDURE — 1036F TOBACCO NON-USER: CPT | Performed by: PSYCHIATRY & NEUROLOGY

## 2021-04-09 PROCEDURE — G8417 CALC BMI ABV UP PARAM F/U: HCPCS | Performed by: PSYCHIATRY & NEUROLOGY

## 2021-04-09 PROCEDURE — 3017F COLORECTAL CA SCREEN DOC REV: CPT | Performed by: PSYCHIATRY & NEUROLOGY

## 2021-04-09 PROCEDURE — G8427 DOCREV CUR MEDS BY ELIG CLIN: HCPCS | Performed by: PSYCHIATRY & NEUROLOGY

## 2021-04-09 RX ORDER — AMITRIPTYLINE HYDROCHLORIDE 50 MG/1
TABLET, FILM COATED ORAL
Qty: 60 TABLET | Refills: 5 | Status: SHIPPED | OUTPATIENT
Start: 2021-04-09 | End: 2021-11-11 | Stop reason: SDUPTHER

## 2021-04-09 ASSESSMENT — ENCOUNTER SYMPTOMS
COUGH: 1
BACK PAIN: 1
SHORTNESS OF BREATH: 1
DIARRHEA: 1
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
## 2021-04-11 LAB
SARS-COV-2: NORMAL
SARS-COV-2: NOT DETECTED
SOURCE: NORMAL

## 2021-04-12 ENCOUNTER — HOSPITAL ENCOUNTER (OUTPATIENT)
Age: 56
Setting detail: OUTPATIENT SURGERY
Discharge: HOME | End: 2021-04-12
Attending: INTERNAL MEDICINE | Admitting: INTERNAL MEDICINE
Payer: COMMERCIAL

## 2021-04-12 ENCOUNTER — ANESTHESIA (OUTPATIENT)
Dept: ENDOSCOPY | Age: 56
End: 2021-04-12
Payer: COMMERCIAL

## 2021-04-12 VITALS
HEART RATE: 88 BPM | SYSTOLIC BLOOD PRESSURE: 158 MMHG | OXYGEN SATURATION: 94 % | HEIGHT: 72 IN | BODY MASS INDEX: 42.66 KG/M2 | RESPIRATION RATE: 15 BRPM | TEMPERATURE: 98.7 F | WEIGHT: 315 LBS | DIASTOLIC BLOOD PRESSURE: 74 MMHG

## 2021-04-12 VITALS — SYSTOLIC BLOOD PRESSURE: 122 MMHG | OXYGEN SATURATION: 100 % | DIASTOLIC BLOOD PRESSURE: 59 MMHG

## 2021-04-12 DIAGNOSIS — J44.9 CHRONIC OBSTRUCTIVE PULMONARY DISEASE, UNSPECIFIED COPD TYPE (HCC): ICD-10-CM

## 2021-04-12 LAB
GLUCOSE BLD-MCNC: 243 MG/DL (ref 75–110)
GLUCOSE BLD-MCNC: 258 MG/DL (ref 75–110)

## 2021-04-12 PROCEDURE — 88305 TISSUE EXAM BY PATHOLOGIST: CPT

## 2021-04-12 PROCEDURE — 2500000003 HC RX 250 WO HCPCS: Performed by: NURSE ANESTHETIST, CERTIFIED REGISTERED

## 2021-04-12 PROCEDURE — 2709999900 HC NON-CHARGEABLE SUPPLY: Performed by: INTERNAL MEDICINE

## 2021-04-12 PROCEDURE — 3609010600 HC COLONOSCOPY POLYPECTOMY SNARE/COLD BIOPSY: Performed by: INTERNAL MEDICINE

## 2021-04-12 PROCEDURE — 45385 COLONOSCOPY W/LESION REMOVAL: CPT | Performed by: INTERNAL MEDICINE

## 2021-04-12 PROCEDURE — 45380 COLONOSCOPY AND BIOPSY: CPT | Performed by: INTERNAL MEDICINE

## 2021-04-12 PROCEDURE — 6360000002 HC RX W HCPCS: Performed by: NURSE ANESTHETIST, CERTIFIED REGISTERED

## 2021-04-12 PROCEDURE — 7100000000 HC PACU RECOVERY - FIRST 15 MIN: Performed by: INTERNAL MEDICINE

## 2021-04-12 PROCEDURE — 2720000010 HC SURG SUPPLY STERILE: Performed by: INTERNAL MEDICINE

## 2021-04-12 PROCEDURE — 3700000001 HC ADD 15 MINUTES (ANESTHESIA): Performed by: INTERNAL MEDICINE

## 2021-04-12 PROCEDURE — 7100000001 HC PACU RECOVERY - ADDTL 15 MIN: Performed by: INTERNAL MEDICINE

## 2021-04-12 PROCEDURE — 82947 ASSAY GLUCOSE BLOOD QUANT: CPT

## 2021-04-12 PROCEDURE — 2580000003 HC RX 258: Performed by: ANESTHESIOLOGY

## 2021-04-12 PROCEDURE — 3700000000 HC ANESTHESIA ATTENDED CARE: Performed by: INTERNAL MEDICINE

## 2021-04-12 RX ORDER — SODIUM CHLORIDE 9 MG/ML
INJECTION, SOLUTION INTRAVENOUS CONTINUOUS
Status: DISCONTINUED | OUTPATIENT
Start: 2021-04-12 | End: 2021-04-12 | Stop reason: HOSPADM

## 2021-04-12 RX ORDER — KETAMINE HYDROCHLORIDE 50 MG/ML
INJECTION, SOLUTION, CONCENTRATE INTRAMUSCULAR; INTRAVENOUS PRN
Status: DISCONTINUED | OUTPATIENT
Start: 2021-04-12 | End: 2021-04-12 | Stop reason: SDUPTHER

## 2021-04-12 RX ORDER — SODIUM CHLORIDE 0.9 % (FLUSH) 0.9 %
5-40 SYRINGE (ML) INJECTION EVERY 12 HOURS SCHEDULED
Status: DISCONTINUED | OUTPATIENT
Start: 2021-04-12 | End: 2021-04-12 | Stop reason: HOSPADM

## 2021-04-12 RX ORDER — PROPOFOL 10 MG/ML
INJECTION, EMULSION INTRAVENOUS CONTINUOUS PRN
Status: DISCONTINUED | OUTPATIENT
Start: 2021-04-12 | End: 2021-04-12 | Stop reason: SDUPTHER

## 2021-04-12 RX ORDER — SODIUM CHLORIDE 0.9 % (FLUSH) 0.9 %
5-40 SYRINGE (ML) INJECTION PRN
Status: DISCONTINUED | OUTPATIENT
Start: 2021-04-12 | End: 2021-04-12 | Stop reason: HOSPADM

## 2021-04-12 RX ORDER — LIDOCAINE HYDROCHLORIDE 10 MG/ML
1 INJECTION, SOLUTION EPIDURAL; INFILTRATION; INTRACAUDAL; PERINEURAL
Status: DISCONTINUED | OUTPATIENT
Start: 2021-04-12 | End: 2021-04-12 | Stop reason: HOSPADM

## 2021-04-12 RX ORDER — GLYCOPYRROLATE 1 MG/5 ML
SYRINGE (ML) INTRAVENOUS PRN
Status: DISCONTINUED | OUTPATIENT
Start: 2021-04-12 | End: 2021-04-12 | Stop reason: SDUPTHER

## 2021-04-12 RX ORDER — SODIUM CHLORIDE 9 MG/ML
25 INJECTION, SOLUTION INTRAVENOUS PRN
Status: DISCONTINUED | OUTPATIENT
Start: 2021-04-12 | End: 2021-04-12 | Stop reason: HOSPADM

## 2021-04-12 RX ADMIN — Medication 0.3 MG: at 08:32

## 2021-04-12 RX ADMIN — KETAMINE HYDROCHLORIDE 50 MG: 50 INJECTION, SOLUTION INTRAMUSCULAR; INTRAVENOUS at 08:32

## 2021-04-12 RX ADMIN — SODIUM CHLORIDE: 9 INJECTION, SOLUTION INTRAVENOUS at 07:37

## 2021-04-12 RX ADMIN — KETAMINE HYDROCHLORIDE 50 MG: 50 INJECTION, SOLUTION INTRAMUSCULAR; INTRAVENOUS at 08:58

## 2021-04-12 RX ADMIN — PROPOFOL 50 MCG/KG/MIN: 10 INJECTION, EMULSION INTRAVENOUS at 08:32

## 2021-04-12 ASSESSMENT — PULMONARY FUNCTION TESTS
PIF_VALUE: 2
PIF_VALUE: 0
PIF_VALUE: 1
PIF_VALUE: 0

## 2021-04-12 ASSESSMENT — PAIN SCALES - GENERAL: PAINLEVEL_OUTOF10: 0

## 2021-04-12 ASSESSMENT — ENCOUNTER SYMPTOMS: STRIDOR: 0

## 2021-04-12 NOTE — H&P
HISTORY and Tre[facility] INES [id number]       NAME:  Verona Gaona MRN: [id number]   YOB: 1965   Date: 4/12/2021   Age: 63 y.o. Gender: male       COMPLAINT AND PRESENT HISTORY:     Verona Gaona is 63 y.o.,   male, having a Colonoscopy diagnostic Per Dr Scott Ordonez. Prior colonoscopy 2015     Patient has no  hx of Colon Polyps or Diverticulosis. Patient denies any  FH of Colon cancer . Patient reports some changes in bowel habits, he has diarrhea comes and goes over the past year. No GI /Rectal bleeding, experiencing red/ black/ BRBPR stools. Pt has good appetite, no weight loss,   Pt states his PCP diagnosed him  With anemia for the last two years. So he has had colonoscopy and EGD done before  per Dr Scott Ordonez. Patient has no complain of  abd. pain no nausea or vomiting. Patient denies any Dysphagia. Pt has hx of GERD Pt is on a PPI, that is helping to control symptoms. Pt denies fever/chills, chest pain . Pt has SOB due to COPD pt is on Oxygen at night, 3 litter. \  Pt has history of difficulty keep him under the anesthesia    Pt npo since the past midnight, no am medication today  Pt stop aspiring and Plavix  Since 4/7/2021    PMHx:  Hypertension, congestive heart failure and CKD stage 3, coronary artery disease:  Pt  is not  exercising and is adherent to low salt diet. Cardiac symptoms  dyspnea, fatigue and lower extremity edema. Patient denies  chest pain, chest pressure/discomfort, claudication, exertional chest pressure/discomfort, irregular heart beat, near-syncope, orthopnea, palpitations, paroxysmal nocturnal dyspnea, syncope and tachypnea.   Use of agents associated with hypertension: none. History of target organ damage: angina/ prior myocardial infarction, chronic kidney disease, heart failure and prior coronary revascularization, has one stent.     CAD with on stent insertion  Pt on Plavix and Asprin   Type 2 diabetes pt on insulin   HTN pt on Lisinopril, Lopressor , aldactone   GERD pt on pantoprazole  Hyperlipidemia pt on pravastatin   Asthma pt on albuterol    Aleep Apnea / COPD use CPAP , and he is on home O2, multiple inhalers, and nebulizer     Lab Results  Component Value Date   WBC 9.7 02/23/2021   HGB 11.4 (L) 02/23/2021   HCT 35.0 (L) 02/23/2021   MCV 85.3 02/23/2021    02/23/2021  Lab Results  Component Value Date    02/23/2021   K 4.6 02/23/2021    02/23/2021   CO2 25 02/23/2021   BUN 44 02/23/2021   CREATININE 1.94 02/23/2021   GLUCOSE 339 02/23/2021   CALCIUM 9.2 02/23/2021     BP Readings from Last 3 Encounters:   04/09/21 (!) 150/90   03/04/21 (!) 186/76   02/23/21 130/80     ECHO Complete 2D W Doppler W Color on 3/25/2021    Contrast was utilized on this technically difficult study. Left ventricle is normal in size. Mild left ventricular hypertrophy. Global left ventricular systolic function is normal. Estimated LV EF 50-55  %. No significant valvular regurgitation or stenosis seen. NM MYOCARDIAL SPECT REST EXERCISE OR RX on 3/25/2021    Impression  1. Subtle inferior wall perfusion defect with slight reversibility, either  small area of ischemia or attenuation infarction which is not fully assessed  in the absence of prone imaging  2. Septal akinesis.  Calculated ejection fraction of 46%. 3. Risk stratification: High-risk  Notes concerning risk stratification:  Risk stratification incorporates both clinical history and some testing  results.  Final risk determination is the responsibility of the ordering  provider as other patient information and test results may increase or  decrease the risk assessment reported for this examination.   Risk stratification criteria are adapted from \"Noninvasive Risk  Stratification\" criteria from Scottie Crews,  ACC/AATS/AHA/ASE/ASNC/SCAI/SCCT/STS 2017 Appropriate Use Criteria For  Coronary Revascularization in Patients With Stable Ischemic Heart Disease  [facility] Volume 69, Issue 17, May 2017  High risk (>3% annual death or MI) 1. Severe resting LV dysfunction (LVEF  <35%) not readily explained by non coronary causes 2. Resting perfusion  abnormalities greater than 10% of the myocardium in patients without prior  history or evidence of MI3. Stress-induced perfusion abnormalities  encumbering greater than or equal to 10% myocardium or stress segmental  scores indicating multiple vascular territories with abnormalities 4. Stress-induced LV dilatation (TID ratio greater than 1.19 for exercise and  greater than 1.39 for regadenoson)  Intermediate risk (1% to 3% annual death or MI) 1. Mild/moderate resting LV  dysfunction (LVEF 35% to 49%) not readily explained by non coronary causes. 2. Resting perfusion abnormalities in 5%-9.9% of the myocardium in patients  without a history or prior evidence of MI 3. Stress-induced perfusion  abnormality encumbering 5%-9.9% of the myocardium or stress segmental scores  indicating 1 vascular territory with abnormalities but without LV dilation 4. Small wall motion abnormality involving 1-2 segments and only 1 coronary bed  Low Risk (Less than 1% annual death or MI) 1. Normal or small myocardial  perfusion defect at rest or with stress encumbering less than 5% of the  myocardium.             PAST MEDICAL HISTORY     Past Medical History:   Diagnosis Date    Acute on chronic kidney failure ([facility] [address] 7/20/2017    Acute on chronic respiratory failure ([facility] 10/2/2018    Adhesive capsulitis of left shoulder 3/25/2017    Anxiety 10/02/2016    smokes marijuana for this    Arthropathy, unspecified, other specified sites 6/13/2013    Asthma     B12 deficiency     Bilateral lower leg cellulitis 2/17/2016    Blood in stool     CAD (coronary artery disease)     Cellulitis of both lower extremities 5/25/2017    Cellulitis of leg, left 07/20/2017    CHF (congestive heart failure), NYHA class III ([facility] 8/14/2013    Chronic back pain     Chronic bronchitis [facility])     Chronic headaches     was referred to neuro, testing scheduled    Chronic respiratory failure ([address]     was on vent    Chronic ulcer of left leg, with fat layer exposed ([address] 02/22/2019    healed    Class 2 severe obesity due to excess calories with serious comorbidity and body mass index (BMI) of 35.0 to 35.9 in adult (HCC)     (BMI 35.0-39.9 without comorbidity)    COPD exacerbation ([address] 11/2/2016    Diabetic neuropathy ([address] 8/14/2013    Displacement of lumbar intervertebral disc without myelopathy 6/13/2013    Ear infection     RIGHT    Essential hypertension     Facial cellulitis 2012    Fall 3/25/2017    GERD (gastroesophageal reflux disease)     Head injury     Hearing loss in right ear     pencil pierced ear as a child    Hepatic steatosis 12/3/2015    History of general anesthesia complication     has woke up during surgery under anesthesia    History of rib fracture 12/3/2015    Chronic     Hyperlipidemia     Hypersomnia     can go multiple days without sleeping    Hypertension     Insomnia     Intolerance of continuous positive airway pressure (CPAP) ventilation 7/20/2017    Iron deficiency     Localized rash     gets frequently in axilla, groin, in any fold, on several topical treatments for this    Magnesium deficiency     Mastoiditis of left side     Mixed conductive and sensorineural hearing loss of both ears 1/10/2017    Per ENT    Mixed type COPD (chronic obstructive pulmonary disease) ([address]     On home O2, multiple inhlaers, nebulizer    Moderate recurrent major depression ([address] 10/2/2016    Morbid obesity with BMI of 45.0-49.9, adult ([address] 6/16/2015    On home oxygen therapy     3 Lpm prn    Open wound of groin 12/19/2018    healed     BARBY on CPAP     Osteoarthritis     Otitis externa of left ear     Pancreatitis chronic     Persistent depressive disorder 11/19/2019    Renal insufficiency     proteinuria    Severe depression ([address] 9/25/2013    Spinal stenosis of lumbar region without neurogenic claudication 1/6/2016    MRI lumbar 12/30/15 L3-L4: There is broad-based bulging disc which appears protruding left laterally causing flattening of the ventral thecal sac. In addition, there is facet arthropathy with mild hypertrophic changes.  There is borderline central canal stenosis with  evidence of moderate left neural foraminal narrowing and mild right neural foramina narrowing.   L4-L5: There is broad-based protrud    Syncope 4/28/2017    Tinnitus of both ears 1/10/2017    Per ENT    Type 2 diabetes mellitus with stage 3 chronic kidney disease, with long-term current use of insulin ([facility] 12/26/2016    due to underlying condition with hyperosmolarity without coma    Type II or unspecified type diabetes mellitus without mention of complication, not stated as uncontrolled     uncontrolled    Vitamin D deficiency     Wears glasses     for reading       SURGICAL HISTORY       Past Surgical History:   Procedure Laterality Date    BACK SURGERY   (x 4) 2000,.12/2011.2/2012     Dr Yany Galaviz last 2 [id number] [facility] CATHETERIZATION  04/23/2018    no stenting    COLONOSCOPY  11/3/2015    hemorrhoids, poor prep, not done    COLONOSCOPY  2013    CORONARY ANGIOPLASTY WITH STENT PLACEMENT  March 2013    x 1    HAND TENDON SURGERY Left     thumb tendon repair    INTRACAPSULAR CATARACT EXTRACTION Right 11/5/2019    EYE CATARACT EMULSIFICATION IOL IMPLANT performed by Iesha Lowe MD at [facility] Left 1/7/2020    EYE CATARACT EMULSIFICATION IOL IMPLANT performed by Iesha Lowe MD at [address] ARTHROSCOPY Left     NERVE BLOCK  07-31-15    TENS unit    DE ESOPHAGOGASTRODUODENOSCOPY TRANSORAL DIAGNOSTIC N/A 7/18/2018    EGD ESOPHAGOGASTRODUODENOSCOPY performed by Sandoval Baez MD at [facility] Bilateral 09/20/2012    Dr Dayanara Lynch History   Problem Relation Age of Onset    Heart Disease Mother          age 64 from [facility] High Blood Pressure Mother     Diabetes Mother     High Blood Pressure Father          age 80 from CKD and Lung Fibrosis    Kidney Disease Father     Heart Disease Sister     Heart Attack Sister     Obesity Sister     Diabetes Sister        SOCIAL HISTORY       Social History     Socioeconomic History    Marital status:      Spouse name: Not on file    Number of children: Not on file    Years of education: Not on file    Highest education level: Not on file   Occupational History    Occupation: disability     Comment: unemployed   Social Needs    Financial resource strain: Not on file    Food insecurity     Worry: Not on file     Inability: Not on file   [employer] needs     Medical: Not on file     Non-medical: Not on file   Tobacco Use    Smoking status: Former Smoker     Packs/day: 0.25     Years: 33.00     Pack years: 8.25     Types: Cigarettes     Start date: 1985     Quit date: 2020     Years since quittin.4    Smokeless tobacco: Former User     Types: Snuff     Quit date: 1995   Substance and Sexual Activity    Alcohol use: No     Alcohol/week: 0.0 standard drinks    Drug use: Yes     Types: Marijuana     Comment: daily for anxiety    Sexual activity: Not Currently   Lifestyle    Physical activity     Days per week: Not on file     Minutes per session: Not on file    Stress: Not on file   Relationships    Social connections     Talks on phone: Not on file     Gets together: Not on file     Attends Zoroastrianism service: Not on file     Active member of club or organization: Not on file     Attends meetings of clubs or organizations: Not on file     Relationship status: Not on file    Intimate partner violence     Fear of current or ex partner: Not on file     Emotionally abused: Not on file     Physically abused: Not on file     Forced sexual activity: Not on file Other Topics Concern    Not on file   Social History Narrative    Middle of possible separation 6/22/2016                REVIEW OF SYSTEMS      Allergies   Allergen Reactions    Levofloxacin Anaphylaxis     Patient reports needing epinephrine \"about 5 or 6 months ago\" for anaphylaxis (itching, hives, SOB/swelling) after receiving Levofloxacin. Previous report from 08/20/2012: Nausea/Vomiting    Lorazepam      Falls      Nsaids      CHF&CKD    Prozac [Fluoxetine Hcl] Other (See Comments)     Pt started with seizures after started taking.  Vancomycin Other (See Comments)     Itching, SOB, emesis upon infusion in ED 6/12/2019. Patient states he has had vancomycin \"a number of times\" before without issue. couldn't breath and talk, throat closed       No current facility-administered medications on file prior to encounter. Current Outpatient Medications on File Prior to Encounter   Medication Sig Dispense Refill    tobramycin-dexamethasone (TOBRADEX) 0.3-0.1 % ophthalmic suspension 5 drops 2 times daily Right ear      pravastatin (PRAVACHOL) 40 MG tablet Take 1 tablet by mouth nightly 90 tablet 3    oxyCODONE HCl (OXY-IR) 10 MG immediate release tablet Take 1 tablet by mouth every 8 hours as needed for Pain for up to 30 days. 90 tablet 0    bumetanide (BUMEX) 1 MG tablet TAKE THREE TABLETS BY MOUTH TWICE A  tablet 2    polyethylene glycol (MIRALAX) 17 GM/SCOOP powder Use as Directed per instructions provided by physician office.  238 g 0    bisacodyl (BISACODYL) 5 MG EC tablet TAKE 2 TABS THE DAY BEFORE COLONOSCOPY AS DIRECTED ON BOWEL PREP INSTRUCTIONS GIVEN BY PHYSICIAN OFFICE 2 tablet 0    spironolactone (ALDACTONE) 50 MG tablet Take 1 tablet by mouth daily 90 tablet 3    docusate sodium (STOOL SOFTENER) 100 MG capsule Take 1 capsule by mouth 2 times daily 180 capsule 3    vitamin D3 (CHOLECALCIFEROL) 25 MCG (1000 UT) TABS tablet Take 1 tablet by mouth daily 90 tablet 1    ammonium day & as needed for symptoms of irregular blood glucose. Dispense sufficient amount for indicated testing frequency plus additional to accommodate PRN testing needs. One touch Ultra blue 300 strip 0    Lancets MISC Use to check blood sugar three times daily along with when necessary due to symptoms. 300 each 2    Insulin Syringe-Needle U-100 31G X 5/16\" 1 ML MISC Use to subcutaneously inject insulin three times daily 300 each 2    clopidogrel (PLAVIX) 75 MG tablet TAKE ONE TABLET BY MOUTH DAILY 90 tablet 3    vitamin B-12 (CYANOCOBALAMIN) 500 MCG tablet Take 1 tablet by mouth daily 90 tablet 3    Oxygen Tubing MISC by Does not apply route DX COPD. chronic respiratory failure 1 each 0    Respiratory Therapy Supplies (NEBULIZER/TUBING/MOUTHPIECE) KIT Dx COPD needs nebulizer supplies 1 kit 11    nitroGLYCERIN (NITROSTAT) 0.4 MG SL tablet Place 1 tablet under the tongue every 5 minutes as needed for Chest pain (and call 911) 25 tablet 3    ONE TOUCH ULTRASOFT LANCETS MISC Patient to test blood sugar up to 4 times daily. 300 each 3    Lancet Devices (LANCING DEVICE) MISC Provide patient with lancing device appropriate for his machine/lancing needles. 1 each 1    Lidocaine 4 % LOTN Apply topically      Spacer/Aero Chamber Mouthpiece MISC 1 each by Does not apply route once as needed (to be used with his inhalers) 1 each 0    metolazone (ZAROXOLYN) 2.5 MG tablet Take 2.5 mg by mouth three times a week MWF      PROAIR  (90 Base) MCG/ACT inhaler INHALE TWO PUFFS BY MOUTH EVERY 6 HOURS AS NEEDED FOR WHEEZING OR SHORTNESS OF BREATH 1 Inhaler 4    Handicap Placard [facility] by Does not apply route Can't walk greater than 200 feet. Expires in 5 years.  1 each 0    fluticasone (CUTIVATE) 0.05 % cream Apply topically 2 times daily       fluticasone (FLONASE) 50 MCG/ACT nasal spray 2 sprays by Nasal route daily (Patient taking differently: 2 sprays by Nasal route daily as needed (sinus symptoms) ) 1 Bottle 3    Melatonin 10 MG TABS Take 10 mg by mouth nightly as needed (insomnia) 90 tablet 1    aspirin 81 MG EC tablet Take 81 mg by mouth daily. Negative except for what is mentioned in the HPI. GENERAL PHYSICAL EXAM     Vitals: see nursing flow sheet for vital sings. GENERAL APPEARANCE:   Lilli Vazquez is 63 y.o.,  male,  nourished, conscious, alert. Does not appear to be distress or pain at this time. SKIN:  Warm, dry, no cyanosis or jaundice. HEAD:  Normocephalic, atraumatic, no swelling or tenderness. EYES:  Pupils equal, reactive to light. EARS:  No discharge, no marked hearing loss. NOSE:  No rhinorrhea, epistaxis or septal deformity. THROAT:  Not congested. No ulceration bleeding or discharge. NECK:  No stiffness, trachea central.  No palpable masses or L.N.                 CHEST:  Symmetrical and equal on expansion. HEART:  RRR S1 > S2. No audible murmurs or gallops. LUNGS:  Equal on expansion, normal breath sounds. No adventitious sounds. ABDOMEN:  Obese. Soft on palpation. No localized tenderness. No guarding or rigidity. No palpable hepatosplenomegaly, bowel sounds active in all 4 quadrants. LYMPHATICS:  No palpable cervical lymphadenopathy. LOCOMOTOR, BACK AND SPINE:  Lumbar back: He exhibits decreased range of motion, tenderness, bony tenderness, pain and spasm. EXTREMITIES:      Right lower leg: Pitting Edema present. Left lower leg: Pitting Edema present. Bilateral stasis dermatitis on the legs purplish violaceous, not warm. Pt is using the  wheel chair. NEUROLOGIC:  The patient is conscious, alert, oriented, No apparent focal sensory or motor deficits.              PROVISIONAL DIAGNOSES / SURGERY:    ANEMIA  COLONOSCOPY DIAGNOSTIC  Patient Active Problem List    Diagnosis Date Noted  Chronic pancreatitis ([facility]      Priority: Low    Colon cancer screening declined 02/23/2021    Excoriation 02/23/2021    Tinea corporis 02/01/2021    Psoriasis 01/27/2021    Chronic malignant otitis externa of both ears 07/24/2020    Hypomagnesemia 07/13/2020    Benign hypertensive heart and CKD, stage 3 (GFR 30-59), w CHF ([address] 06/14/2020    Chronic respiratory failure with hypoxia ([facility] 03/26/2020    Mobility impaired 12/05/2019    Vitamin B 12 deficiency 11/27/2019    Recurrent infection of skin 08/11/2019    Seizures ([address] 06/27/2019    Bilateral hearing loss 06/03/2019    Venous insufficiency of left lower extremity 02/22/2019    Postlaminectomy syndrome of lumbar region 11/15/2018    Celiac artery stenosis (HCC) 05/01/2018    Dry skin dermatitis legs 09/08/2017    Hydrocele, bilateral 07/10/2017    Tinea pedis of both feet 07/05/2017    Onychomycosis 07/05/2017    Recurrent major depressive disorder, in partial remission ([facility] 07/05/2017    Chronic infection of both external ears 04/28/2017    Chronic infective otitis externa 04/28/2017    Slow transit constipation 03/25/2017    Nuclear nonsenile cataract 03/08/2017    Mixed conductive and sensorineural hearing loss of both ears 01/10/2017    Tinnitus of both ears 01/10/2017    Anemia 12/24/2016    History of recurrent ear infection 11/25/2016    Anxiety 10/02/2016    Intertrigo axillary b/l 10/02/2016    Chronic midline low back pain with left-sided sciatica 08/21/2016    Stasis dermatitis of both legs 08/21/2016    BARBY treated with BiPAP 06/24/2016    Vitamin D deficiency 04/21/2016    Iron deficiency anemia due to chronic blood loss 04/21/2016    Lower urinary tract symptoms (LUTS) 02/25/2016    Gastroesophageal reflux disease without esophagitis 02/15/2016    Hyperlipidemia with target LDL less than 70 02/15/2016    Spinal stenosis of lumbar region without neurogenic claudication 01/06/2016    Chronic back pain greater than 3 months duration 01/06/2016    Hepatic steatosis 12/03/2015    History of rib fracture 43/93/3721    Umbilical hernia 99/02/6799    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 [address] 08/14/2013    Displacement of lumbar intervertebral disc without myelopathy 06/13/2013    CKD (chronic kidney disease) stage 3, GFR 30-59 ml/min 05/08/2013    CAD (coronary artery disease) s/p 1 stent     Mixed type COPD (chronic obstructive pulmonary disease) (HCC)     Type 2 diabetes mellitus with diabetic polyneuropathy, with long-term current use of insulin ([facility]     Hypertriglyceridemia 04/10/2013    Myopia 02/21/2013    Presbyopia 02/21/2013    Otitis externa 08/29/2012    DDD (degenerative disc disease), lumbar 08/20/2012           NICOLE Perkins - CNP on 4/12/2021 at 6:26 AM

## 2021-04-12 NOTE — ANESTHESIA PRE PROCEDURE
Department of Anesthesiology  Preprocedure Note       Name:  Imani Boyce. Age:  63 y.o.  :  1965                                          MRN:  [id number]         Date:  2021      Surgeon: Ronnie Spencer):  Lauren Arriola MD    Procedure: Procedure(s):  COLONOSCOPY DIAGNOSTIC    Medications prior to admission:   Prior to Admission medications    Medication Sig Start Date End Date Taking? Authorizing Provider   amitriptyline (ELAVIL) 50 MG tablet Take 2 po qhs 21   Carmella Osborn MD   tobramycin-dexamethasone [facility]) 0.3-0.1 % ophthalmic suspension 5 drops 2 times daily Right ear    Historical Provider, MD   pravastatin (PRAVACHOL) 40 MG tablet Take 1 tablet by mouth nightly 3/31/21   Vanessa Noguera MD   nystatin (MYCOSTATIN) 014143 UNIT/GM powder Apply 2 times daily in the skin folds for several months 3/31/21   Vanessa Noguera MD   escitalopram (LEXAPRO) 10 MG tablet Take 1 tablet by mouth daily 3/31/21   Vanessa Noguera MD   tiZANidine (ZANAFLEX) 4 MG tablet TAKE ONE TABLET BY MOUTH EVERY 8 HOURS AS NEEDED FOR BACK PAIN 3/31/21   Vanessa Noguera MD   oxyCODONE HCl (OXY-IR) 10 MG immediate release tablet Take 1 tablet by mouth every 8 hours as needed for Pain for up to 30 days. 3/23/21 4/22/21  Vanessa Noguera MD   bumetanide (BUMEX) 1 MG tablet TAKE THREE TABLETS BY MOUTH TWICE A DAY 3/8/21   Vanessa Noguera MD   spironolactone (ALDACTONE) 50 MG tablet Take 1 tablet by mouth daily 21   Vanessa Noguera MD   vitamin D3 (CHOLECALCIFEROL) 25 MCG (1000 UT) TABS tablet Take 1 tablet by mouth daily 21   Vanessa Noguera MD   ammonium lactate (LAC-HYDRIN) 12 % lotion Apply topically daily. For dry skin 21   Vanessa Noguera MD   mupirocin (BACTROBAN) 2 % ointment Apply topically 2 times daily on the affected area for 7-10 days.  OK to substitute to cream 21   Vanessa Noguera MD   amitriptyline (ELAVIL) 25 MG tablet Take 1 po qhs x 2 days then 2 po qhs 2/5/21   Brant Florence MD   clobetasol (TEMOVATE) 0.05 % ointment Apply topically 2 times daily for psoriasis 1/27/21   Jovanni Reddy MD   ketoconazole (NIZORAL) 2 % cream Apply twice a day for yeast infection in the skin folds, for 4 weeks 1/20/21   Jovanni Reddy MD   insulin regular human (HUMULIN R) 500 UNIT/ML concentrated injection vial Patient using 0.52ml with breakfast, 0.52ml with lunch and 0.45ml with dinner. 1/20/21   Jj Beltran MD   albuterol (PROVENTIL) (2.5 MG/3ML) 0.083% nebulizer solution Take 3 mLs by nebulization every 6 hours as needed for Wheezing or Shortness of Breath 1/15/21   Jovanni Reddy MD   pantoprazole (PROTONIX) 40 MG tablet Take 1 tablet by mouth every morning (before breakfast) 1/13/21   Jovanni Reddy MD   Ferrous Sulfate (IRON) 325 (65 Fe) MG TABS Take 1 tablet by mouth daily 11/25/20   Jovanni Reddy MD   metoprolol tartrate (LOPRESSOR) 50 MG tablet Take 1 tablet by mouth 2 times daily 11/4/20   Jovanni Reddy MD   magnesium oxide (MAG-OX) 400 (240 Mg) MG tablet Take 1 tablet by mouth daily 11/4/20   Jovanni Reddy MD   lisinopril (PRINIVIL;ZESTRIL) 5 MG tablet Take 1 tablet by mouth daily . Discontinued Lisinopril  10 mg 11/2/20   Jovanni Reddy MD   fluticasone-salmeterol (ADVAIR HFA) 230-21 MCG/ACT inhaler Inhale 2 puffs into the lungs 2 times daily 10/19/20   Jovanni Reddy MD   tiotropium (SPIRIVA RESPIMAT) 2.5 MCG/ACT AERS inhaler Inhale 2 puffs into the lungs daily 10/19/20   Jovanni Reddy MD   venlafaxine (EFFEXOR XR) 75 MG extended release capsule Take 1 capsule by mouth daily Take with food 10/14/20   Jovanni Reddy MD   naloxone 4 MG/0.1ML LIQD nasal spray 1 spray by Nasal route as needed for Opioid Reversal Due to COPD and sleep apnea, this is a big recommendation for you  Patient not taking: Reported on 4/9/2021 9/30/20   Jovanni Reddy MD   blood glucose monitor strips Test 3 times a day & as needed for symptoms of irregular blood glucose. Dispense sufficient amount for indicated testing frequency plus additional to accommodate PRN testing needs. One touch Ultra blue 9/30/20   Rona Brand MD   Lancets MISC Use to check blood sugar three times daily along with when necessary due to symptoms. 9/30/20   Rona Brand MD   Insulin Syringe-Needle U-100 31G X 5/16\" 1 ML MISC Use to subcutaneously inject insulin three times daily 9/30/20   Rona Barnd MD   clopidogrel (PLAVIX) 75 MG tablet TAKE ONE TABLET BY MOUTH DAILY 7/29/20   Maria Isabel Freedman MD   vitamin B-12 (CYANOCOBALAMIN) 500 MCG tablet Take 1 tablet by mouth daily 7/13/20   Maria Isabel Freedman MD   Oxygen Tubing MISC by Does not apply route DX COPD. chronic respiratory failure 3/26/20   Maria Isabel Freedman MD   Respiratory Therapy Supplies (NEBULIZER/TUBING/MOUTHPIECE) KIT Dx COPD needs nebulizer supplies 2/20/20   Maria Isabel Freedman MD   nitroGLYCERIN (NITROSTAT) 0.4 MG SL tablet Place 1 tablet under the tongue every 5 minutes as needed for Chest pain (and call 911) 2/13/20   Maria Isabel Freedman MD   ONE TOUCH ULTRASOFT LANCETS [id number] [facility] Patient to test blood sugar up to 4 times daily. 11/7/19   Rona Brand MD   Lancet Devices (LANCING DEVICE) MISC Provide patient with lancing device appropriate for his machine/lancing needles.  6/4/19   Maria Isabel Freedman MD   Lidocaine 4 % LOTN Apply topically    Historical Provider, MD   Spacer/Aero Chamber Mouthpiece MISC 1 each by Does not apply route once as needed (to be used with his inhalers) 4/15/19 4/9/21  Maria Isabel Freedman MD   metolazone (ZAROXOLYN) 2.5 MG tablet Take 2.5 mg by mouth three times a week F    Historical Provider, MD   PROAIR  (90 Base) MCG/ACT inhaler INHALE TWO PUFFS BY MOUTH EVERY 6 HOURS AS NEEDED FOR WHEEZING OR SHORTNESS OF BREATH 3/27/19   Cheryl Kellogg MD   Handicap Placard MISC by Does not apply route Can't walk greater than 200 feet. Expires in 5 years. 2/13/19   Paige Bryant MD   fluticasone (CUTIVATE) 0.05 % cream Apply topically 2 times daily  4/19/17   Historical Provider, MD   fluticasone (FLONASE) 50 MCG/ACT nasal spray 2 sprays by Nasal route daily  Patient taking differently: 2 sprays by Nasal route daily as needed (sinus symptoms)  1/16/17   Vanessa Noguera MD   Melatonin 10 MG TABS Take 10 mg by mouth nightly as needed (insomnia) 12/23/16   Vanessa Noguera MD   aspirin 81 MG EC tablet Take 81 mg by mouth daily. Historical Provider, MD       Current medications:    Current Facility-Administered Medications   Medication Dose Route Frequency Provider Last Rate Last Admin    0.9 % sodium chloride infusion   Intravenous Continuous Fernando Oquendo MD        sodium chloride flush 0.9 % injection 5-40 mL  5-40 mL Intravenous 2 times per day Fernando Oquendo MD        sodium chloride flush 0.9 % injection 5-40 mL  5-40 mL Intravenous PRN Fernando Oquendo MD        0.9 % sodium chloride infusion  25 mL Intravenous PRN Fernando Oquendo MD        lidocaine PF 1 % injection 1 mL  1 mL Intradermal Once PRN Fernando Oquendo MD           Allergies: Allergies   Allergen Reactions    Levofloxacin Anaphylaxis     Patient reports needing epinephrine \"about 5 or 6 months ago\" for anaphylaxis (itching, hives, SOB/swelling) after receiving Levofloxacin. Previous report from 08/20/2012: Nausea/Vomiting    Lorazepam      Falls      Nsaids      CHF&CKD    Prozac [Fluoxetine Hcl] Other (See Comments)     Pt started with seizures after started taking.  Vancomycin Other (See Comments)     Itching, SOB, emesis upon infusion in ED 6/12/2019. Patient states he has had vancomycin \"a number of times\" before without issue. couldn't breath and talk, throat closed       Problem List:    Patient Active Problem List   Diagnosis Code    DDD (degenerative disc disease), lumbar M51.36    Otitis externa H60.90    Presbyopia H52.4    Postlaminectomy syndrome of lumbar region M96.1    Venous insufficiency of left lower extremity I87.2    Bilateral hearing loss H91.93    Seizures ([facility]) R56.9    Recurrent infection of skin L08.9    Vitamin B 12 deficiency E53.8    Mobility impaired Z74.09    Chronic respiratory failure with hypoxia ([facility]) J96.11    Chronic infective otitis externa H60.399    Benign hypertensive heart and CKD, stage 3 (GFR 30-59), w CHF ([facility]) I13.0, N18.30    Hypomagnesemia E83.42    Chronic malignant otitis externa of both ears H60.23    Psoriasis L40.9    Tinea corporis B35.4    Colon cancer screening declined Z53.20    Excoriation T14. 8XXA       Past Medical History:        Diagnosis Date    Acute on chronic kidney failure ([address] 7/20/2017    Acute on chronic respiratory failure ([facility]) 10/2/2018    Adhesive capsulitis of left shoulder 3/25/2017    Anxiety 10/02/2016    smokes marijuana for this    Arthropathy, unspecified, other specified sites 6/13/2013    Asthma     B12 deficiency     Bilateral lower leg cellulitis 2/17/2016    Blood in stool     CAD (coronary artery disease)     Cellulitis of both lower extremities 5/25/2017    Cellulitis of leg, left 07/20/2017    CHF (congestive heart failure), NYHA class III ([facility]) 8/14/2013    Chronic back pain     Chronic bronchitis ([facility])     Chronic headaches     was referred to neuro, testing scheduled    Chronic respiratory failure ([address]     was on vent    Chronic ulcer of left leg, with fat layer exposed ([address] 02/22/2019    healed    Class 2 severe obesity due to excess calories with serious comorbidity and body mass index (BMI) of 35.0 to 35.9 in adult ([address]     (BMI 35.0-39.9 without comorbidity)    COPD exacerbation ([address] 11/2/2016    Diabetic neuropathy ([address] 8/14/2013    Displacement of lumbar intervertebral disc without myelopathy 6/13/2013    Ear infection     RIGHT    Essential hypertension     Facial cellulitis 2012    Fall 3/25/2017    GERD (gastroesophageal reflux disease)     Head injury     Hearing loss in right ear     pencil pierced ear as a child    Hepatic steatosis 12/3/2015    History of general anesthesia complication     has woke up during surgery under anesthesia    History of rib fracture 12/3/2015    Chronic     Hyperlipidemia     Hypersomnia     can go multiple days without sleeping    Hypertension     Insomnia     Intolerance of continuous positive airway pressure (CPAP) ventilation 7/20/2017    Iron deficiency     Localized rash     gets frequently in axilla, groin, in any fold, on several topical treatments for this    Magnesium deficiency     Mastoiditis of left side     Mixed conductive and sensorineural hearing loss of both ears 1/10/2017    Per ENT    Mixed type COPD (chronic obstructive pulmonary disease) ([address]     On home O2, multiple inhlaers, nebulizer    Moderate recurrent major depression ([address] 10/2/2016    Morbid obesity with BMI of 45.0-49.9, adult ([address] 6/16/2015    On home oxygen therapy     3 Lpm prn    Open wound of groin 12/19/2018    healed     BARBY on CPAP     Osteoarthritis     Otitis externa of left ear     Pancreatitis chronic     Persistent depressive disorder 11/19/2019    Renal insufficiency     proteinuria    Severe depression ([address] 9/25/2013    Spinal stenosis of lumbar region without neurogenic claudication 1/6/2016    MRI lumbar 12/30/15 L3-L4: There is broad-based bulging disc which appears protruding left laterally causing flattening of the ventral thecal sac. In addition, there is facet arthropathy with mild hypertrophic changes.  There is borderline central canal stenosis with  evidence of moderate left neural foraminal narrowing and mild right neural foramina narrowing.   L4-L5: There is broad-based protrud    Syncope 4/28/2017    Tinnitus of both ears 1/10/2017    Per ENT    Type 2 diabetes mellitus with stage 3 chronic kidney disease, with BP Readings from Last 3 Encounters:   04/12/21 (!) 160/60   04/09/21 (!) 150/90   03/04/21 (!) 186/76       NPO Status: Time of last liquid consumption: 0400(sips of water with meds)                        Time of last solid consumption: 1900                        Date of last liquid consumption: 04/12/21                        Date of last solid food consumption: 04/10/21    BMI:   Wt Readings from Last 3 Encounters:   04/12/21 (!) 418 lb (189.6 kg)   04/05/21 (!) 418 lb (189.6 kg)   04/09/21 (!) 418 lb (189.6 kg)     Body mass index is 56.69 kg/m². CBC:   Lab Results   Component Value Date    WBC 9.7 02/23/2021    RBC 4.10 02/23/2021    HGB 11.4 02/23/2021    HCT 35.0 02/23/2021    MCV 85.3 02/23/2021    RDW 15.8 02/23/2021     02/23/2021       CMP:   Lab Results   Component Value Date     02/23/2021    K 4.6 02/23/2021     02/23/2021    CO2 25 02/23/2021    BUN 44 02/23/2021    CREATININE 1.94 02/23/2021    GFRAA 44 02/23/2021    LABGLOM 36 02/23/2021    GLUCOSE 339 02/23/2021    PROT 7.0 02/23/2021    CALCIUM 9.2 02/23/2021    BILITOT 0.24 02/23/2021    ALKPHOS 88 02/23/2021    AST 17 02/23/2021    ALT 18 02/23/2021       POC Tests: No results for input(s): POCGLU, POCNA, POCK, POCCL, POCBUN, POCHEMO, POCHCT in the last 72 hours.     Coags:   Lab Results   Component Value Date    PROTIME 10.3 06/23/2016    INR 1.0 06/23/2016    APTT 28.0 02/15/2016       HCG (If Applicable): No results found for: PREGTESTUR, PREGSERUM, HCG, HCGQUANT     ABGs:   Lab Results   Component Value Date    PHART 7.410 08/29/2016    PO2ART 76.8 08/29/2016    MMK7UAS 38.6 08/29/2016    QWT4YEF 24.4 08/29/2016    Q2QPCLFP 91.1 08/29/2016        Type & Screen (If Applicable):  No results found for: LABABO, LABRH    Drug/Infectious Status (If Applicable):  Lab Results   Component Value Date    HEPCAB NONREACTIVE 08/05/2016       COVID-19 Screening (If Applicable):   Lab Results

## 2021-04-12 NOTE — OP NOTE
COLONOSCOPY    DATE OF PROCEDURE: 4/12/2021    SURGEON: Manny Kong MD    ASSISTANT: None    PREOPERATIVE DIAGNOSIS: Patient has recent onset of anemia, diarrhea. Procedure performed to evaluate colonic pathology    POSTOPERATIVE DIAGNOSIS: Multiple polyps as described. No colitis or ileitis seen. OPERATION: Total colonoscopy and snare polypectomy x3, Endo Clip application, random biopsies from the colon    ANESTHESIA: MAC    ESTIMATED BLOOD LOSS: None    COMPLICATIONS: None     SPECIMENS:  Was Obtained: Polyps transverse colon, polyps left colon, random biopsies from the right, transverse and left colon evaluate microscopic colitis    HISTORY: The patient is a 63y.o. year old male with history of above preop diagnosis. I recommended colonoscopy with possible biopsy or polypectomy and I explained the risk, benefits, expected outcome, and alternatives to the procedure. Risks included but are not limited to bleeding, infection, respiratory distress, hypotension, and perforation of the colon and possibility of missing a lesion. The patient understands and is in agreement. PROCEDURE:  The patient's SPO2 remained above 90% throughout the procedure. Digital rectal exam was normal.  The colonoscope was inserted through the anus into the rectum and advanced under direct vision to the cecum with difficulty. Terminal ileum was examined for approximately 2 inches. The prep was adequate. Findings:  Terminal ileum: normal, examined for 2 inches    Cecum/Ascending colon: normal    Transverse colon: abnormal: There were 2 polyps which are about 5 to 7 mm flat. This was removed with the snare and the polypectomy. This in the next transverse colon. From one of the polypectomy site there were some bleeding of blood noted. Subsequently Endo Clip applied to obtain hemostasis. Descending/Sigmoid colon:  In the left colon at about 80 cm from the anus there was a polyp which is about 5 to 7 mm, removed using snare and cautery technique. Rectum/Anus: examined in normal and retroflexed positions and was normal      Random biopsies taken from the right, transverse, left colon evaluate microscopic colitis    Withdrawal Time was (minutes): 20          The colon was decompressed and the scope was removed. The patient tolerated the procedure without unusual events. During the procedure, the patient's blood pressure, pulse and oxygen saturation remained stable and documented. It was very difficult to sedate this patient. Patient was moving all over the bed and was deep in breathing using abdominal muscles making it difficult to examine satisfactorily. However no apple core lesions or other huge polypoid lesion seen. No colitis or ileitis seen. Also there was a problem with IV and it took around the time to complete this procedure. Roughly took about an hour. Recommendations/Plan:   1. F/U Biopsies  2. F/U In Office as instructed  3. Discussed with the family  4. High fiber diet   5. Precautions to avoid constipation   6. Hold aspirin 6 days    Next colonoscopy: 3 years.   If Colonoscopy is less than 10 years the recommended reason is due:polyps, multiple polyps    Electronically signed by Alexander Khanna MD  on 4/12/2021 at 9:24 AM

## 2021-04-13 LAB — SURGICAL PATHOLOGY REPORT: NORMAL

## 2021-04-20 ENCOUNTER — PATIENT MESSAGE (OUTPATIENT)
Dept: FAMILY MEDICINE CLINIC | Age: 56
End: 2021-04-20

## 2021-04-20 DIAGNOSIS — G89.29 CHRONIC MIDLINE LOW BACK PAIN WITH LEFT-SIDED SCIATICA: Primary | ICD-10-CM

## 2021-04-20 DIAGNOSIS — M51.36 DDD (DEGENERATIVE DISC DISEASE), LUMBAR: ICD-10-CM

## 2021-04-20 DIAGNOSIS — M54.42 CHRONIC MIDLINE LOW BACK PAIN WITH LEFT-SIDED SCIATICA: Primary | ICD-10-CM

## 2021-04-20 DIAGNOSIS — M48.061 SPINAL STENOSIS OF LUMBAR REGION WITHOUT NEUROGENIC CLAUDICATION: ICD-10-CM

## 2021-04-20 DIAGNOSIS — M96.1 POSTLAMINECTOMY SYNDROME OF LUMBAR REGION: ICD-10-CM

## 2021-04-20 RX ORDER — OXYCODONE HYDROCHLORIDE 10 MG/1
10 TABLET ORAL EVERY 8 HOURS PRN
Qty: 90 TABLET | Refills: 0 | Status: SHIPPED | OUTPATIENT
Start: 2021-04-20 | End: 2021-05-19 | Stop reason: SDUPTHER

## 2021-04-20 NOTE — TELEPHONE ENCOUNTER
From: Maggie Munoz.   To: Kamilah Kramer MD  Sent: 4/20/2021 9:48 AM EDT  Subject: Non-Urgent Medical Question    Can you please send in my Pain Medicine 10 Mg one ever 8 hours thank you

## 2021-04-23 ENCOUNTER — TELEPHONE (OUTPATIENT)
Dept: FAMILY MEDICINE CLINIC | Age: 56
End: 2021-04-23

## 2021-04-23 ENCOUNTER — OFFICE VISIT (OUTPATIENT)
Dept: FAMILY MEDICINE CLINIC | Age: 56
End: 2021-04-23
Payer: COMMERCIAL

## 2021-04-23 VITALS
SYSTOLIC BLOOD PRESSURE: 138 MMHG | DIASTOLIC BLOOD PRESSURE: 88 MMHG | HEIGHT: 72 IN | TEMPERATURE: 97.7 F | OXYGEN SATURATION: 94 % | BODY MASS INDEX: 42.66 KG/M2 | WEIGHT: 315 LBS | HEART RATE: 89 BPM

## 2021-04-23 DIAGNOSIS — E11.42 TYPE 2 DIABETES MELLITUS WITH DIABETIC POLYNEUROPATHY, WITH LONG-TERM CURRENT USE OF INSULIN (HCC): ICD-10-CM

## 2021-04-23 DIAGNOSIS — M54.42 CHRONIC MIDLINE LOW BACK PAIN WITH LEFT-SIDED SCIATICA: ICD-10-CM

## 2021-04-23 DIAGNOSIS — N18.30 BENIGN HYPERTENSIVE HEART AND CKD, STAGE 3 (GFR 30-59), W CHF (HCC): ICD-10-CM

## 2021-04-23 DIAGNOSIS — S81.801A WOUND OF RIGHT LOWER EXTREMITY, INITIAL ENCOUNTER: Primary | ICD-10-CM

## 2021-04-23 DIAGNOSIS — G89.29 CHRONIC MIDLINE LOW BACK PAIN WITH LEFT-SIDED SCIATICA: ICD-10-CM

## 2021-04-23 DIAGNOSIS — I13.0 BENIGN HYPERTENSIVE HEART AND CKD, STAGE 3 (GFR 30-59), W CHF (HCC): ICD-10-CM

## 2021-04-23 DIAGNOSIS — Z79.4 TYPE 2 DIABETES MELLITUS WITH DIABETIC POLYNEUROPATHY, WITH LONG-TERM CURRENT USE OF INSULIN (HCC): ICD-10-CM

## 2021-04-23 DIAGNOSIS — F33.1 MODERATE EPISODE OF RECURRENT MAJOR DEPRESSIVE DISORDER (HCC): ICD-10-CM

## 2021-04-23 LAB — HBA1C MFR BLD: 8 %

## 2021-04-23 PROCEDURE — 3017F COLORECTAL CA SCREEN DOC REV: CPT | Performed by: FAMILY MEDICINE

## 2021-04-23 PROCEDURE — G8417 CALC BMI ABV UP PARAM F/U: HCPCS | Performed by: FAMILY MEDICINE

## 2021-04-23 PROCEDURE — 1036F TOBACCO NON-USER: CPT | Performed by: FAMILY MEDICINE

## 2021-04-23 PROCEDURE — 3052F HG A1C>EQUAL 8.0%<EQUAL 9.0%: CPT | Performed by: FAMILY MEDICINE

## 2021-04-23 PROCEDURE — 99214 OFFICE O/P EST MOD 30 MIN: CPT | Performed by: FAMILY MEDICINE

## 2021-04-23 PROCEDURE — G8427 DOCREV CUR MEDS BY ELIG CLIN: HCPCS | Performed by: FAMILY MEDICINE

## 2021-04-23 PROCEDURE — 2022F DILAT RTA XM EVC RTNOPTHY: CPT | Performed by: FAMILY MEDICINE

## 2021-04-23 PROCEDURE — 83036 HEMOGLOBIN GLYCOSYLATED A1C: CPT | Performed by: FAMILY MEDICINE

## 2021-04-23 RX ORDER — SULFACETAMIDE SODIUM 100 MG/ML
SOLUTION/ DROPS OPHTHALMIC
COMMUNITY
Start: 2021-03-24 | End: 2021-09-30

## 2021-04-23 RX ORDER — CEFUROXIME AXETIL 500 MG/1
500 TABLET ORAL 2 TIMES DAILY
Qty: 20 TABLET | Refills: 0 | Status: SHIPPED | OUTPATIENT
Start: 2021-04-23 | End: 2021-05-03

## 2021-04-23 RX ORDER — BLOOD PRESSURE TEST KIT
KIT MISCELLANEOUS
Qty: 60 EACH | Refills: 5 | Status: SHIPPED | OUTPATIENT
Start: 2021-04-23 | End: 2022-03-07 | Stop reason: ALTCHOICE

## 2021-04-23 ASSESSMENT — PATIENT HEALTH QUESTIONNAIRE - PHQ9
7. TROUBLE CONCENTRATING ON THINGS, SUCH AS READING THE NEWSPAPER OR WATCHING TELEVISION: 0
SUM OF ALL RESPONSES TO PHQ QUESTIONS 1-9: 13
1. LITTLE INTEREST OR PLEASURE IN DOING THINGS: 3
SUM OF ALL RESPONSES TO PHQ9 QUESTIONS 1 & 2: 6
3. TROUBLE FALLING OR STAYING ASLEEP: 3
SUM OF ALL RESPONSES TO PHQ QUESTIONS 1-9: 13
6. FEELING BAD ABOUT YOURSELF - OR THAT YOU ARE A FAILURE OR HAVE LET YOURSELF OR YOUR FAMILY DOWN: 3

## 2021-04-23 ASSESSMENT — COLUMBIA-SUICIDE SEVERITY RATING SCALE - C-SSRS: 1. WITHIN THE PAST MONTH, HAVE YOU WISHED YOU WERE DEAD OR WISHED YOU COULD GO TO SLEEP AND NOT WAKE UP?: NO

## 2021-04-23 ASSESSMENT — ENCOUNTER SYMPTOMS
NAUSEA: 0
DIARRHEA: 0
ABDOMINAL DISTENTION: 0
BACK PAIN: 1
WHEEZING: 0
VOMITING: 0
CHEST TIGHTNESS: 0
SHORTNESS OF BREATH: 1
ABDOMINAL PAIN: 0
CONSTIPATION: 0

## 2021-04-23 NOTE — PATIENT INSTRUCTIONS
Patient Education        Learning About Carbohydrate (Carb) Counting and Eating Out When You Have Diabetes  Why plan your meals? Meal planning can be a key part of managing diabetes. Planning meals and snacks with the right balance of carbohydrate, protein, and fat can help you keep your blood sugar at the target level you set with your doctor. You don't have to eat special foods. You can eat what your family eats, including sweets once in a while. But you do have to pay attention to how often you eat and how much you eat of certain foods. You may want to work with a dietitian or a certified diabetes educator. He or she can give you tips and meal ideas and can answer your questions about meal planning. This health professional can also help you reach a healthy weight if that is one of your goals. What should you know about eating carbs? Managing the amount of carbohydrate (carbs) you eat is an important part of healthy meals when you have diabetes. Carbohydrate is found in many foods. · Learn which foods have carbs. And learn the amounts of carbs in different foods. ? Bread, cereal, pasta, and rice have about 15 grams of carbs in a serving. A serving is 1 slice of bread (1 ounce), ½ cup of cooked cereal, or 1/3 cup of cooked pasta or rice. ? Fruits have 15 grams of carbs in a serving. A serving is 1 small fresh fruit, such as an apple or orange; ½ of a banana; ½ cup of cooked or canned fruit; ½ cup of fruit juice; 1 cup of melon or raspberries; or 2 tablespoons of dried fruit. ? Milk and no-sugar-added yogurt have 15 grams of carbs in a serving. A serving is 1 cup of milk or 2/3 cup of no-sugar-added yogurt. ? Starchy vegetables have 15 grams of carbs in a serving. A serving is ½ cup of mashed potatoes or sweet potato; 1 cup winter squash; ½ of a small baked potato; ½ cup of cooked beans; or ½ cup cooked corn or green peas.   · Learn how much carbs to eat each day and at each meal. A dietitian or CDE can teach you how to keep track of the amount of carbs you eat. This is called carbohydrate counting. · If you are not sure how to count carbohydrate grams, use the Plate Method to plan meals. It is a good, quick way to make sure that you have a balanced meal. It also helps you spread carbs throughout the day. ? Divide your plate by types of foods. Put non-starchy vegetables on half the plate, meat or other protein food on one-quarter of the plate, and a grain or starchy vegetable in the final quarter of the plate. To this you can add a small piece of fruit and 1 cup of milk or yogurt, depending on how many carbs you are supposed to eat at a meal.  · Try to eat about the same amount of carbs at each meal. Do not \"save up\" your daily allowance of carbs to eat at one meal.  · Proteins have very little or no carbs per serving. Examples of proteins are beef, chicken, turkey, fish, eggs, tofu, cheese, cottage cheese, and peanut butter. A serving size of meat is 3 ounces, which is about the size of a deck of cards. Examples of meat substitute serving sizes (equal to 1 ounce of meat) are 1/4 cup of cottage cheese, 1 egg, 1 tablespoon of peanut butter, and ½ cup of tofu. How can you eat out and still eat healthy? · Learn to estimate the serving sizes of foods that have carbohydrate. If you measure food at home, it will be easier to estimate the amount in a serving of restaurant food. · If the meal you order has too much carbohydrate (such as potatoes, corn, or baked beans), ask to have a low-carbohydrate food instead. Ask for a salad or green vegetables. · If you use insulin, check your blood sugar before and after eating out to help you plan how much to eat in the future. · If you eat more carbohydrate at a meal than you had planned, take a walk or do other exercise. This will help lower your blood sugar. What are some tips for eating healthy? · Limit saturated fat, such as the fat from meat and dairy products.  This is a healthy choice because people who have diabetes are at higher risk of heart disease. So choose lean cuts of meat and nonfat or low-fat dairy products. Use olive or canola oil instead of butter or shortening when cooking. · Don't skip meals. Your blood sugar may drop too low if you skip meals and take insulin or certain medicines for diabetes. · Check with your doctor before you drink alcohol. Alcohol can cause your blood sugar to drop too low. Alcohol can also cause a bad reaction if you take certain diabetes medicines. Follow-up care is a key part of your treatment and safety. Be sure to make and go to all appointments, and call your doctor if you are having problems. It's also a good idea to know your test results and keep a list of the medicines you take. Where can you learn more? Go to https://[employer]. org and sign in to your [employer] account. Enter L704 in the [employer] box to learn more about \"Learning About Carbohydrate (Carb) Counting and Eating Out When You Have Diabetes. \"     If you do not have an account, please click on the \"Sign Up Now\" link. Current as of: August 31, 2020               Content Version: 12.8  © 2006-2021 [employer], [employer]. Care instructions adapted under license by [facility] ([facility]). If you have questions about a medical condition or this instruction, always ask your healthcare professional. [place] [address] any warranty or liability for your use of this information. Patient Education        Diabetes Blood Sugar Emergencies: Your Action Plan  How can you prevent a blood sugar emergency? An important part of living with diabetes is keeping your blood sugar in your target range. You'll need to know what to do if it's too high or too low. Managing your blood sugar levels helps you avoid emergencies. This care sheet will teach you about the signs of high and low blood sugar.  It will help you make an action plan with your doctor for when these signs occur. Low blood sugar is more likely to happen if you take certain medicines for diabetes. It can also happen if you skip a meal, drink alcohol, or exercise more than usual.  You may get high blood sugar if you eat differently than you normally do. One example is eating more carbohydrate than usual. Having a cold, the flu, or other sudden illness can also cause high blood sugar levels. Levels can also rise if you miss a dose of medicine. Any change in how you take your medicine may affect your blood sugar level. So it's important to work with your doctor before you make any changes. Track your blood sugar  Work with your doctor to fill in the blank spaces below that apply to you. Track your levels, know your target range, and write down ways you can get your blood sugar back in your target range. A log book can help you track your levels. Take the book to all of your medical appointments. · Check your blood sugar _____ times a day, at these times:________________________________________________. (For example: Before meals, at bedtime, before exercise, during exercise, other.)  · Your blood sugar target range before a meal is ___________________. Your blood sugar target range after a meal is _______________________. · Do this___________________________________________________to get your blood sugar back within your safe range if your blood sugar results are _________________________________________. (For example: Less than 70 or above 250 mg/dL.)  Call your doctor when your blood sugar results are ___________________________________. (For example: Less than 70 or above 250 mg/dL.)  What are the symptoms of low and high blood sugar? Common symptoms of low blood sugar are sweating and feeling shaky, weak, hungry, or confused. Symptoms can start quickly. Common symptoms of high blood sugar are feeling very thirsty or very hungry.  You may also pass urine more often than usual. You may have blurry vision and may lose weight without trying. But some people may have high or low blood sugar without having any symptoms. That's a good reason to check your blood sugar on a regular schedule. What should you do if you have symptoms? Work with your doctor to fill in the blank spaces below that apply to you. Low blood sugar  If you have symptoms of low blood sugar, check your blood sugar. If it's below _____ ( for example, below 70), eat or drink a quick-sugar food that has about 15 grams of carbohydrate. Your goal is to get your level back to your safe range. Check your blood sugar again 15 minutes later. If it's still not in your target range, take another 15 grams of carbohydrate and check your blood sugar again in 15 minutes. Repeat this until you reach your target. Then go back to your regular testing schedule. Children usually need less than 15 grams of carbohydrate. Check with your doctor or diabetes educator for the amount that is right for your child. When you have low blood sugar, it's best to stop or reduce any physical activity until your blood sugar is back in your target range and is stable. If you must stay active, eat or drink 30 grams of carbohydrate. Then check your blood sugar again in 15 minutes. If it's not in your target range, take another 30 grams of carbohydrates. Check your blood sugar again in 15 minutes. Keep doing this until you reach your target. You can then go back to your regular testing schedule. If your symptoms or blood sugar levels are getting worse or have not improved after 15 minutes, seek medical care right away.    Here are some examples of quick-sugar foods with 15 grams of carbohydrate:  · 3 or 4 glucose tablets  · 1 tablespoon (3 teaspoons) table sugar  · ½ cup to ¾ cup (4 to 6 ounces) of fruit juice or regular (not diet) soda  · Hard candy (such as 6 Life Savers)  High blood sugar  If you have symptoms of high blood sugar, check your blood sugar. Your goal is to get your level back to your target range. If it's above ______ ( for example, above 250), follow these steps:  · If you missed a dose of your diabetes medicine, take it now. Take only the amount of medicine that you have been prescribed. Do not take more or less medicine. · Give yourself insulin if your doctor has prescribed it for high blood sugar. · Test for ketones, if the doctor told you to do so. If the results of the ketone test show a moderate-to-large amount of ketones, call the doctor for advice. · Wait 30 minutes after you take the extra insulin or the missed medicine. Check your blood sugar again. If your symptoms or blood sugar levels are getting worse or have not improved after taking these steps, seek medical care right away. Follow-up care is a key part of your treatment and safety. Be sure to make and go to all appointments, and call your doctor if you are having problems. It's also a good idea to know your test results and keep a list of the medicines you take. Where can you learn more? Go to https://[employer]BandPage.[employer]. org and sign in to your [employer] account. Enter Z898 in the [facility] box to learn more about \"Diabetes Blood Sugar Emergencies: Your Action Plan. \"     If you do not have an account, please click on the \"Sign Up Now\" link. Current as of: August 31, 2020               Content Version: 12.8  © 8771-8795 [employer]. Care instructions adapted under license by [facility] ([facility]). If you have questions about a medical condition or this instruction, always ask your healthcare professional. [place] [address] any warranty or liability for your use of this information.

## 2021-04-23 NOTE — PROGRESS NOTES
Miguel Zacarias. (:  1965) is a 63 y.o. male,Established patient, here for evaluation of the following chief complaint(s): Wound Check (B/L LEGS, STARTED SATURDAY PAIN SCORE 9/10/ APPOINTMENT WITH DR KATE ON 2021)      ASSESSMENT/PLAN:    1. Wound of right lower extremity, initial encounter  Worsening    -     [address]  -     cefUROXime (CEFTIN) 500 MG tablet; Take 1 tablet by mouth 2 times daily for 10 days, Disp-20 tablet, R-0Normal  -     mupirocin (BACTROBAN) 2 % ointment; Apply topically 2 times daily on the affected area for 7-10 days. OK to substitute to cream, Disp-30 g, R-2, Normal  -     Gauze Pads & Dressings 4\"X4\" PADS; Disp-60 each, R-5, PrintTwice a day dressing of right leg wound  -     Gauze Bandages (ROLLED GAUZE BANDAGE 4\"X2. 5YD) MISC; Disp-60 each, R-5, PrintFor twice a day dressing  We tried to make him an appointment with wound care but they were closed, he will call on Monday for appointment with wound care      2. Benign hypertensive heart and CKD, stage 3 (GFR 30-59), w CHF ([facility] [address]  Stable Chronic kidney disease stage 3  Well controlled HTN  Continue current treatment. Will recheck labs. -     Magnesium; Future  -     Phosphorus; Future  -     Brain Natriuretic Peptide; Future  Discussed low salt diet and BP and pulse monitoring daily  CBC  CMP    3. Type 2 diabetes mellitus with diabetic polyneuropathy, with long-term current use of insulin ([facility])  Improved    Lab Results   Component Value Date    LABA1C 8.0 2021    LABA1C 8.7 (H) 2021    LABA1C 7.3 (H) 2020       -     POCT glycosylated hemoglobin (Hb A1C)  -     CBC Auto Differential; Future  -     Comprehensive Metabolic Panel; Future    -advised home blood glucose testing 1-2 times per day  -daily feet exam, Foot care: advised to wash feet daily, pat dry and apply lotion at night, avoiding between toes.  Need to look at feet daily and report to a physician 4/28/2021  9:10 AM [facility] MEDICATION MGMT [facility]   5/3/2021  1:30 PM Alexander Khanna MD [facility]   7/20/2021  8:00 AM Hilaria Wolfe MD [facility]         SUBJECTIVE/OBJECTIVE:    Comes with his daughter, [place]  He is in wheelchair today      Miguel reports Right leg blister open for 6 days  draining, seeping, the daughter says they have been putting antibiotic on it, but has been getting worse and is more redness around. Patient says initially it was a large blister which he touched it and then it popped  He feels he is going to have other blisters open on the left leg as well  The blister is very painful to touch, Intensity of pain is 9/10  He feels it is getting infected. Patient has known hypertension, CHF and chronic kidney disease stage III  He reports fatigue, shortness of breath and leg swelling at baseline  His daughter says he has lost some weight due to the prep for colonoscopy and it helped him. I praised him that he did a colonoscopy and he did go to ENT. He smiled. His daughter says if the infection in the ear is resolved, he may get hearing aids. He denies chest pain, palpitations. He has history of 1 stent and he follows with cardiologist.    Blood pressure controlled, towards the higher side today, likely related to pain  BP Readings from Last 3 Encounters:   04/23/21 138/88   04/12/21 (!) 122/59   04/12/21 (!) 158/74     Lab Results   Component Value Date    LVEF 46 03/26/2021    LVEFMODE Echo 08/29/2017       Type 2 diabetes mellitus improving with polyneuropathy in his legs  Reports compliance with medications. Follows with pharmacy specialist    Checking Blood Glucose once or twice a day, low  120's, 160's, 170's. Checking 2-3 times per day.     A1c improved    Lab Results   Component Value Date    LABA1C 8.0 04/23/2021    LABA1C 8.7 (H) 01/20/2021    LABA1C 7.3 (H) 09/25/2020     He is soon getting his COVID-19 vaccine I praised him for that  Advised to schedule appointment with ophthalmologist whenever he can    Depression  Worsening  Denies suicidal ideation, plan or intent. Taking Effexor    PHQ-2 Over the past 2 weeks, how often have you been bothered by any of the following problems? Little interest or pleasure in doing things: Nearly every day  Feeling down, depressed, or hopeless: Nearly every day  PHQ-2 Score: 6  PHQ-9 Over the past 2 weeks, how often have you been bothered by any of the following problems? Trouble falling or staying asleep, or sleeping too much: Nearly every day  Feeling tired or having little energy: Not at all  Poor appetite or overeating: Not at all  Feeling bad about yourself - or that you are a failure or have let yourself or your family down: Nearly every day  Trouble concentrating on things, such as reading the newspaper or watching television: Not at all  Moving or speaking so slowly that other people could have noticed. Or the opposite - being so fidgety or restless that you have been moving around a lot more than usual: Several days  Thoughts that you would be better off dead, or of hurting yourself in some way: Not at all  If you checked off any problems, how difficult have these problems made it for you to do your work, take care of things at home, or get along with other people?: Not difficult at all  PHQ-9 Total Score: 13   [x]10-14 = Moderate depression    PHQ Scores 4/23/2021 2/23/2021 1/27/2021 2/20/2020 11/19/2019 6/19/2019 3/15/2019   PHQ2 Score 6 0 0 3 2 4 6   PHQ9 Score 13 0 0 17 2 14 17     Has Chronic low back pain, midline, radiates to the sides worse on the left side. He has history of 4 back surgeries. Intensity of the pain 7-8 out of 10, the pain seems to be worse when prolonged sitting, prolonged standing up or with activities. Taking medications helps him stay more active, denies side effects      Prior to Visit Medications    Medication Sig Taking?  Authorizing Provider   sulfacetamide (BLEPH-10) 10 % ophthalmic solution  Yes Historical Provider, MD   oxyCODONE HCl (OXY-IR) 10 MG immediate release tablet Take 1 tablet by mouth every 8 hours as needed for Pain for up to 30 days. Yes Travis Mosley MD   PROAIR  (90 Base) MCG/ACT inhaler INHALE TWO PUFFS BY MOUTH EVERY 6 HOURS AS NEEDED FOR WHEEZING OR FOR SHORTNESS OF BREATH Yes Travis Mosley MD   amitriptyline (ELAVIL) 50 MG tablet Take 2 po qhs Yes Dariusz Howe MD   tobramycin-dexamethasone (TOBRADEX) 0.3-0.1 % ophthalmic suspension 5 drops 2 times daily Right ear Yes Historical Provider, MD   pravastatin (PRAVACHOL) 40 MG tablet Take 1 tablet by mouth nightly Yes Travis Mosley MD   nystatin (MYCOSTATIN) 573710 UNIT/GM powder Apply 2 times daily in the skin folds for several months Yes Travis Mosley MD   escitalopram (LEXAPRO) 10 MG tablet Take 1 tablet by mouth daily Yes Travis Mosley MD   tiZANidine (ZANAFLEX) 4 MG tablet TAKE ONE TABLET BY MOUTH EVERY 8 HOURS AS NEEDED FOR BACK PAIN Yes Travis Mosley MD   bumetanide (BUMEX) 1 MG tablet TAKE THREE TABLETS BY MOUTH TWICE A DAY Yes Travis Mosley MD   spironolactone (ALDACTONE) 50 MG tablet Take 1 tablet by mouth daily Yes Travis Mosley MD   vitamin D3 (CHOLECALCIFEROL) 25 MCG (1000 UT) TABS tablet Take 1 tablet by mouth daily Yes Travis Mosley MD   ammonium lactate (LAC-HYDRIN) 12 % lotion Apply topically daily. For dry skin Yes Travis Mosley MD   mupirocin (BACTROBAN) 2 % ointment Apply topically 2 times daily on the affected area for 7-10 days.  OK to substitute to cream Yes Travis Mosley MD   amitriptyline (ELAVIL) 25 MG tablet Take 1 po qhs x 2 days then 2 po qhs Yes Dariusz Howe MD   clobetasol (TEMOVATE) 0.05 % ointment Apply topically 2 times daily for psoriasis Yes Travis Mosley MD   ketoconazole (NIZORAL) 2 % cream Apply twice a day for yeast infection in the skin folds, for 4 weeks Yes Jovanni Reddy MD   insulin regular human (HUMULIN R) 500 UNIT/ML concentrated injection vial Patient using 0.52ml with breakfast, 0.52ml with lunch and 0.45ml with dinner. Yes Jj Beltran MD   albuterol (PROVENTIL) (2.5 MG/3ML) 0.083% nebulizer solution Take 3 mLs by nebulization every 6 hours as needed for Wheezing or Shortness of Breath Yes Jovanni Reddy MD   pantoprazole (PROTONIX) 40 MG tablet Take 1 tablet by mouth every morning (before breakfast) Yes Jovanni Reddy MD   Ferrous Sulfate (IRON) 325 (65 Fe) MG TABS Take 1 tablet by mouth daily Yes Jovanni Reddy MD   metoprolol tartrate (LOPRESSOR) 50 MG tablet Take 1 tablet by mouth 2 times daily Yes Jovanni Reddy MD   magnesium oxide (MAG-OX) 400 (240 Mg) MG tablet Take 1 tablet by mouth daily Yes Jovanni Reddy MD   lisinopril (PRINIVIL;ZESTRIL) 5 MG tablet Take 1 tablet by mouth daily . Discontinued Lisinopril  10 mg Yes Jovanni Reddy MD   fluticasone-salmeterol (ADVAIR HFA) 230-21 MCG/ACT inhaler Inhale 2 puffs into the lungs 2 times daily Yes Jovanni Reddy MD   tiotropium (SPIRIVA RESPIMAT) 2.5 MCG/ACT AERS inhaler Inhale 2 puffs into the lungs daily Yes Jovanni Reddy MD   venlafaxine (EFFEXOR XR) 75 MG extended release capsule Take 1 capsule by mouth daily Take with food Yes Jovanni Reddy MD   naloxone 4 MG/0.1ML LIQD nasal spray 1 spray by Nasal route as needed for Opioid Reversal Due to COPD and sleep apnea, this is a big recommendation for you Yes Jovanni Reddy MD   blood glucose monitor strips Test 3 times a day & as needed for symptoms of irregular blood glucose. Dispense sufficient amount for indicated testing frequency plus additional to accommodate PRN testing needs. One touch Ultra blue Yes Jj Beltran MD   Lancets MISC Use to check blood sugar three times daily along with when necessary due to symptoms.  Yes Jj Beltran MD   Insulin Syringe-Needle U-100 31G X 5/16\" 1 ML MISC Use to subcutaneously inject insulin three times daily Yes Harmeet Rivers MD   clopidogrel (PLAVIX) 75 MG tablet TAKE ONE TABLET BY MOUTH DAILY Yes Demian Appiah MD   vitamin B-12 (CYANOCOBALAMIN) 500 MCG tablet Take 1 tablet by mouth daily Yes Demian Appiah MD   Oxygen Tubing MISC by Does not apply route DX COPD. chronic respiratory failure Yes Demian Appiah MD   Respiratory Therapy Supplies (NEBULIZER/TUBING/MOUTHPIECE) KIT Dx COPD needs nebulizer supplies Yes Demian Appiah MD   nitroGLYCERIN (NITROSTAT) 0.4 MG SL tablet Place 1 tablet under the tongue every 5 minutes as needed for Chest pain (and call 911) Yes Demian Appiah MD   [address] [facility] [address] Patient to test blood sugar up to 4 times daily. Yes Harmeet Rivers MD   Lancet Devices (LANCING DEVICE) MISC Provide patient with lancing device appropriate for his machine/lancing needles. Yes Demian Appiah MD   Lidocaine 4 % LOTN Apply topically Yes Historical Provider, MD   metolazone (ZAROXOLYN) 2.5 MG tablet Take 2.5 mg by mouth three times a week MWF Yes Historical Provider, MD   Handicap Placard MISC by Does not apply route Can't walk greater than 200 feet. Expires in 5 years. Yes Demian Appiah MD   fluticasone (CUTIVATE) 0.05 % cream Apply topically 2 times daily  Yes Historical Provider, MD   fluticasone (FLONASE) 50 MCG/ACT nasal spray 2 sprays by Nasal route daily  Patient taking differently: 2 sprays by Nasal route daily as needed (sinus symptoms)  Yes Paige Bryant MD   Melatonin 10 MG TABS Take 10 mg by mouth nightly as needed (insomnia) Yes Demian Appiah MD   aspirin 81 MG EC tablet Take 81 mg by mouth daily.  Yes Historical Provider, MD   Spacer/Aero Chamber Mouthpiece [address] [facility] [address] 1 each by Does not apply route once as needed (to be used with his inhalers)  Demian Appiah MD       Social History     Tobacco Use    Smoking status: Former Smoker     Packs/day: 0.25     Years: 33.00 Pack years: 8.25     Types: Cigarettes     Start date: 1985     Quit date: 2020     Years since quittin.4    Smokeless tobacco: Former User     Types: Snuff     Quit date: 1995   Substance Use Topics    Alcohol use: No     Alcohol/week: 0.0 standard drinks    Drug use: Yes     Types: Marijuana     Comment: daily for anxiety       Review of Systems   Constitutional: Positive for fatigue and unexpected weight change. Negative for activity change, appetite change, chills, diaphoresis and fever. HENT: Positive for hearing loss (chronic, pending hearign aids) and tinnitus (b/l, chronic). Eyes: Positive for visual disturbance (stable, chronic). Respiratory: Positive for cough (mild) and shortness of breath. Negative for chest tightness and wheezing. Quit smoking completely, praise given   Cardiovascular: Positive for leg swelling. Negative for chest pain and palpitations. Gastrointestinal: Negative for abdominal distention, abdominal pain, constipation, diarrhea, nausea and vomiting. Endocrine: Negative for cold intolerance, heat intolerance, polydipsia, polyphagia and polyuria. Musculoskeletal: Positive for back pain and gait problem. In  Wheelchair today   Skin: Positive for rash and wound (right legs). Allergic/Immunologic: Positive for immunocompromised state (due to diabetes). Neurological: Positive for numbness (and burning in his feet since ). Hematological: Does not bruise/bleed easily. Psychiatric/Behavioral: Positive for decreased concentration, dysphoric mood and sleep disturbance. Negative for self-injury and suicidal ideas. The patient is nervous/anxious.          -vital signs stable and within normal limits except Morbid obesity per BMI and mildly low pulse ox.      /88 (Site: Left Upper Arm)   Pulse 89   Temp 97.7 °F (36.5 °C)   Ht 6' (1.829 m)   Wt (!) 407 lb (184.6 kg)   SpO2 94%   BMI 55.20 kg/m²        Physical Exam  Vitals Pitting Edema present. Lymphadenopathy:      Cervical: No cervical adenopathy. Skin:     General: Skin is warm and dry. Capillary Refill: Capillary refill takes less than 2 seconds. Findings: Rash present. Comments: Bilateral stasis dermatitis on the legs purplish reddish   On the anterior right leg open blister, seeping, 3.5 cm x 2.5 cm by 3 mm deep, yellow drainage, moderate amount, very tender to touch, with surrounding erythema likely early cellulitis, we applied triple antibiotic cream and gauze and wrapped around. On the left leg there are a few small water blisters as well, not open   Neurological:      Mental Status: He is alert and oriented to person, place, and time. Deep Tendon Reflexes: Reflexes are normal and symmetric. Comments: Sensory deficit bilateral feet decreased sensation. He is limited today, he did not walk   Psychiatric:         Mood and Affect: Mood is anxious and depressed. Behavior: Behavior normal.         Thought Content: Thought content normal.         Judgment: Judgment normal.             I personally reviewed testing with patient.   Chronic kidney disease stage III stable  Hyperglycemia  Anemia, had recent colonoscopy, polyps removed  Otherwise labs within normal limits    Lab Results   Component Value Date    WBC 9.7 02/23/2021    HGB 11.4 (L) 02/23/2021    HCT 35.0 (L) 02/23/2021    MCV 85.3 02/23/2021     02/23/2021       Lab Results   Component Value Date     02/23/2021    K 4.6 02/23/2021     02/23/2021    CO2 25 02/23/2021    BUN 44 02/23/2021    CREATININE 1.94 02/23/2021    GLUCOSE 339 02/23/2021    CALCIUM 9.2 02/23/2021        Lab Results   Component Value Date    ALT 18 02/23/2021    AST 17 02/23/2021    ALKPHOS 88 02/23/2021    BILITOT 0.24 (L) 02/23/2021       Lab Results   Component Value Date    TSH 2.26 01/20/2021       Lab Results   Component Value Date    LDLCHOLESTEROL 82 09/25/2020    LDLCHOLESTEROL 75 03/03/2020    LDLCHOLESTEROL 61 09/12/2019     Lab Results   Component Value Date    CHOLHDLRATIO 4.6 09/25/2020    CHOLHDLRATIO 4.4 03/03/2020    CHOLHDLRATIO 4.8 09/12/2019       Lab Results   Component Value Date    LABA1C 8.0 04/23/2021       Lab Results   Component Value Date    OXVGMQUX82 599 09/25/2020       Lab Results   Component Value Date    FOLATE 13.3 09/25/2020             Orders Placed This Encounter   Procedures    CBC Auto Differential     Standing Status:   Future     Standing Expiration Date:   12/23/2021    Comprehensive Metabolic Panel     Standing Status:   Future     Standing Expiration Date:   12/23/2021    Magnesium     Standing Status:   Future     Standing Expiration Date:   12/23/2021    Phosphorus     Standing Status:   Future     Standing Expiration Date:   12/23/2021    Brain Natriuretic Peptide     Standing Status:   Future     Standing Expiration Date:   12/23/2021   [address]     Referral Priority:   Urgent     Referral Type:   Eval and Treat     Referral Reason:   Specialty Services Required     Number of Visits Requested:   1    POCT glycosylated hemoglobin (Hb A1C)       Orders Placed This Encounter   Medications    cefUROXime (CEFTIN) 500 MG tablet     Sig: Take 1 tablet by mouth 2 times daily for 10 days     Dispense:  20 tablet     Refill:  0    mupirocin (BACTROBAN) 2 % ointment     Sig: Apply topically 2 times daily on the affected area for 7-10 days. OK to substitute to cream     Dispense:  30 g     Refill:  2    Gauze Pads & Dressings 4\"X4\" PADS     Sig: Twice a day dressing of right leg wound     Dispense:  60 each     Refill:  5    Gauze Bandages (ROLLED GAUZE BANDAGE 4\"X2. 5YD) MISC     Sig: For twice a day dressing     Dispense:  60 each     Refill:  5       Medications Discontinued During This Encounter   Medication Reason    mupirocin (BACTROBAN) 2 % ointment REORDER    tobramycin-dexamethasone (TOBRADEX) 0.3-0.1 % ophthalmic suspension Therapy completed           On this date 4/23/2021 I have spent  35 minutes reviewing previous notes, test results and face to face with the patient discussing the diagnosis and importance of compliance with the treatment plan as well as documenting on the day of the visit and care coordination with his daughter. Future Appointments   Date Time Provider William Givensi   4/28/2021  9:10 AM [facility] MEDICATION MGMT [facility]   5/3/2021  1:30 PM Alla Conner MD [address]   7/20/2021  8:00 AM Corin Craft MD [facility]       This note was completed by using the assistance of a speech-recognition program. However, inadvertent computerized transcription errors may be present. Although every effort was made to ensure accuracy, no guarantees can be provided that every mistake has been identified and corrected by editing. An electronic signature was used to authenticate this note.   Electronically signed by Corin Craft MD on 4/25/2021 at 10:11 AM

## 2021-04-23 NOTE — RESULT ENCOUNTER NOTE
Addressed during office visit today, A1c 8, abnormal, greatly improved diabetes, continue treatment recommended during the office visit

## 2021-04-23 NOTE — PROGRESS NOTES
Visit Information    Have you changed or started any medications since your last visit including any over-the-counter medicines, vitamins, or herbal medicines? no   Have you stopped taking any of your medications? Is so, why? -  no  Are you having any side effects from any of your medications? - no    Have you seen any other physician or provider since your last visit? yes - CARDIOLOGY, UROLOGY; NEUROLOGY   Have you had any other diagnostic tests since your last visit? yes - 03/26/2021 NM MYOCARDIAL SPECT REST EXERCISE OR RX;    Have you been seen in the emergency room and/or had an admission in a hospital since we last saw you?  yes - 04/12/2021 COLONOSCOPY   Have you had your routine dental cleaning in the past 6 months?  no     Do you have an active MyChart account? If no, what is the barrier?   Yes    Patient Care Team:  Bishnu Stewart MD as PCP - General (Family Medicine)  Bishnu Stewart MD as PCP - [facility]  Allan Sweet MD as Consulting Physician (Endocrinology)  Sara Domingo DO as Consulting Physician (Cardiology)  Marielle Mclain DPM as Surgeon (Podiatry)  Jovana Benjamin MD as Consulting Physician (Ophthalmology)  Kylah Edouard MD as Consulting Physician (Nephrology)  James Warren MD as Consulting Physician (Pulmonology)  Edenilson Plaza MD as Surgeon (Vascular Surgery)  Lesly Lucero MD as Consulting Physician (Gastroenterology)  Tatiana Samayoa [facility] as Pharmacist (Pharmacist)  Danay Ward MD as Consulting Physician (Pulmonology)  Melinda Wallace MD as Consulting Physician (Urology)  Evangelina Mccain MD as Surgeon (Ophthalmology)  Rodney Kearns MD as Consulting Physician (Neurology)  Rodney Kearns MD as Consulting Physician (Neurology)  James Warren DO as Consulting Physician (Otolaryngology)    Medical History Review  Past Medical, Family, and Social History reviewed and does contribute to the patient presenting condition    Health Maintenance   Topic Date Due    COVID-19 Vaccine (1) Never done    Shingles Vaccine (1 of 2) Never done    Diabetic retinal exam  10/24/2019    Diabetic foot exam  05/02/2020    A1C test (Diabetic or Prediabetic)  04/20/2021    Lipid screen  09/25/2021    Potassium monitoring  02/23/2022    Creatinine monitoring  02/23/2022    Annual Wellness Visit (AWV)  02/24/2022    Colon cancer screen colonoscopy  04/12/2023    DTaP/Tdap/Td vaccine (3 - Td) 09/12/2028    Hepatitis B vaccine  Completed    Flu vaccine  Completed    Pneumococcal 0-64 years Vaccine  Completed    Hepatitis C screen  Completed    HIV screen  Completed    Hepatitis A vaccine  Aged Out    Hib vaccine  Aged Out    Meningococcal (ACWY) vaccine  Aged Out

## 2021-04-25 ASSESSMENT — ENCOUNTER SYMPTOMS: COUGH: 1

## 2021-04-26 ENCOUNTER — TELEPHONE (OUTPATIENT)
Dept: PHARMACY | Age: 56
End: 2021-04-26

## 2021-04-26 NOTE — TELEPHONE ENCOUNTER
Called and spoke to patient about conflict with appt on 8/58 and inquired if he could move times. Patient indicates he has appts every day this week and has multiple issues going on and would like to postpone his appt a week or two. Patient states his blood sugars have been coming down and are back down to his controlled values from before. Not having any hypoglycemia and has enough diabetic medications and testing supplies. A1c done on 4/23 shows improving glucose control with value of 8. Patient has appt with endocrinologist next week so will reschedule patient for 5/19 to follow up. Yesenia Rizvi,Pharm. D,, BCPS, CACP  4/26/2021  11:26 AM

## 2021-04-27 ENCOUNTER — HOSPITAL ENCOUNTER (OUTPATIENT)
Dept: WOUND CARE | Age: 56
Discharge: HOME | End: 2021-04-27
Payer: COMMERCIAL

## 2021-04-27 VITALS
RESPIRATION RATE: 18 BRPM | TEMPERATURE: 97 F | HEART RATE: 91 BPM | SYSTOLIC BLOOD PRESSURE: 132 MMHG | DIASTOLIC BLOOD PRESSURE: 64 MMHG

## 2021-04-27 DIAGNOSIS — L97.912 CHRONIC ULCER OF LEG, RIGHT, WITH FAT LAYER EXPOSED (HCC): ICD-10-CM

## 2021-04-27 DIAGNOSIS — I87.2 VENOUS INSUFFICIENCY OF BOTH LOWER EXTREMITIES: ICD-10-CM

## 2021-04-27 DIAGNOSIS — I89.0 LYMPHEDEMA OF BOTH LOWER EXTREMITIES: ICD-10-CM

## 2021-04-27 DIAGNOSIS — L97.922 CHRONIC ULCER OF LEG, LEFT, WITH FAT LAYER EXPOSED (HCC): ICD-10-CM

## 2021-04-27 PROCEDURE — 99214 OFFICE O/P EST MOD 30 MIN: CPT

## 2021-04-27 PROCEDURE — 99214 OFFICE O/P EST MOD 30 MIN: CPT | Performed by: PODIATRIST

## 2021-04-27 PROCEDURE — 29580 STRAPPING UNNA BOOT: CPT

## 2021-04-27 RX ORDER — LIDOCAINE HYDROCHLORIDE 20 MG/ML
JELLY TOPICAL ONCE
Status: CANCELLED | OUTPATIENT
Start: 2021-04-27 | End: 2021-04-27

## 2021-04-27 RX ORDER — BACITRACIN ZINC AND POLYMYXIN B SULFATE 500; 1000 [USP'U]/G; [USP'U]/G
OINTMENT TOPICAL ONCE
Status: CANCELLED | OUTPATIENT
Start: 2021-04-27 | End: 2021-04-27

## 2021-04-27 RX ORDER — LIDOCAINE 50 MG/G
OINTMENT TOPICAL ONCE
Status: CANCELLED | OUTPATIENT
Start: 2021-04-27 | End: 2021-04-27

## 2021-04-27 RX ORDER — LIDOCAINE HYDROCHLORIDE 40 MG/ML
SOLUTION TOPICAL ONCE
Status: CANCELLED | OUTPATIENT
Start: 2021-04-27 | End: 2021-04-27

## 2021-04-27 RX ORDER — GENTAMICIN SULFATE 1 MG/G
OINTMENT TOPICAL ONCE
Status: CANCELLED | OUTPATIENT
Start: 2021-04-27 | End: 2021-04-27

## 2021-04-27 RX ORDER — CLOBETASOL PROPIONATE 0.5 MG/G
OINTMENT TOPICAL ONCE
Status: CANCELLED | OUTPATIENT
Start: 2021-04-27 | End: 2021-04-27

## 2021-04-27 RX ORDER — BACITRACIN, NEOMYCIN, POLYMYXIN B 400; 3.5; 5 [USP'U]/G; MG/G; [USP'U]/G
OINTMENT TOPICAL ONCE
Status: CANCELLED | OUTPATIENT
Start: 2021-04-27 | End: 2021-04-27

## 2021-04-27 RX ORDER — LIDOCAINE 40 MG/G
CREAM TOPICAL ONCE
Status: CANCELLED | OUTPATIENT
Start: 2021-04-27 | End: 2021-04-27

## 2021-04-27 RX ORDER — BETAMETHASONE DIPROPIONATE 0.05 %
OINTMENT (GRAM) TOPICAL ONCE
Status: CANCELLED | OUTPATIENT
Start: 2021-04-27 | End: 2021-04-27

## 2021-04-27 RX ORDER — GINSENG 100 MG
CAPSULE ORAL ONCE
Status: CANCELLED | OUTPATIENT
Start: 2021-04-27 | End: 2021-04-27

## 2021-04-27 ASSESSMENT — PAIN DESCRIPTION - PROGRESSION: CLINICAL_PROGRESSION: NOT CHANGED

## 2021-04-27 ASSESSMENT — PAIN DESCRIPTION - ONSET: ONSET: ON-GOING

## 2021-04-27 ASSESSMENT — PAIN SCALES - GENERAL: PAINLEVEL_OUTOF10: 8

## 2021-04-27 ASSESSMENT — PAIN DESCRIPTION - LOCATION: LOCATION: LEG

## 2021-04-27 ASSESSMENT — PAIN DESCRIPTION - ORIENTATION: ORIENTATION: RIGHT

## 2021-04-27 ASSESSMENT — PAIN DESCRIPTION - PAIN TYPE: TYPE: CHRONIC PAIN

## 2021-04-27 NOTE — H&P
64 [address]ánchez  New Patient History and Physical      Miguel Linares. AGE: 63 y.o. GENDER: male  : 1965  TODAY'S DATE:  2021    Chief Complaint: right leg wound    History of the Present Illness       Miguel Rodrigues. is a 63 y.o. male who presents today for evaluation and treatment for a venous wound which is located on the right leg    History of Wound: Patient states he first noted the wound approximately 1 week ago. Denies any injury or trauma. Notes he has had a wound a few years back which has stayed healed.  Currently on antibiotics  Wound Type:venous  Wound Location:right leg  Modifying factors:venous stasis, lymphedema and diabetes    Pain Level: 8   Pain Assessment: 0-10     Abx :Yes   Cultures :were notobtained  Pain : 8/10    PAST MEDICAL HISTORY        Diagnosis Date    Acute on chronic kidney failure (HCC) 2017    Acute on chronic respiratory failure (HCC) 10/2/2018    Adhesive capsulitis of left shoulder 3/25/2017    Anxiety 10/02/2016    smokes marijuana for this    Arthropathy, unspecified, other specified sites 2013    Asthma     B12 deficiency     Bilateral lower leg cellulitis 2016    Blood in stool     CAD (coronary artery disease)     Cellulitis of both lower extremities 2017    Cellulitis of leg, left 2017    CHF (congestive heart failure), NYHA class III ([facility]) 2013    Chronic back pain     Chronic bronchitis ([facility])     Chronic headaches     was referred to neuro, testing scheduled    Chronic respiratory failure ([address]     was on vent    Chronic ulcer of left leg, with fat layer exposed ([address] 2019    healed    Class 2 severe obesity due to excess calories with serious comorbidity and body mass index (BMI) of 35.0 to 35.9 in adult ([address]     (BMI 35.0-39.9 without comorbidity)    COPD exacerbation ([address] 2016    Diabetic neuropathy ([address] 2013    Displacement of lumbar intervertebral disc without myelopathy 6/13/2013    Ear infection     RIGHT    Essential hypertension     Facial cellulitis 2012    Fall 3/25/2017    GERD (gastroesophageal reflux disease)     Head injury     Hearing loss in right ear     pencil pierced ear as a child    Hepatic steatosis 12/3/2015    History of general anesthesia complication     has woke up during surgery under anesthesia    History of rib fracture 12/3/2015    Chronic     Hyperlipidemia     Hypersomnia     can go multiple days without sleeping    Hypertension     Insomnia     Intolerance of continuous positive airway pressure (CPAP) ventilation 7/20/2017    Iron deficiency     Localized rash     gets frequently in axilla, groin, in any fold, on several topical treatments for this    Magnesium deficiency     Mastoiditis of left side     Mixed conductive and sensorineural hearing loss of both ears 1/10/2017    Per ENT    Mixed type COPD (chronic obstructive pulmonary disease) ([address]     On home O2, multiple inhlaers, nebulizer    Moderate recurrent major depression ([address] 10/2/2016    Morbid obesity with BMI of 45.0-49.9, adult ([address] 6/16/2015    On home oxygen therapy     3 Lpm prn    Open wound of groin 12/19/2018    healed     BARBY on CPAP     Osteoarthritis     Otitis externa of left ear     Pancreatitis chronic     Persistent depressive disorder 11/19/2019    Renal insufficiency     proteinuria    Severe depression ([address] 9/25/2013    Spinal stenosis of lumbar region without neurogenic claudication 1/6/2016    MRI lumbar 12/30/15 L3-L4: There is broad-based bulging disc which appears protruding left laterally causing flattening of the ventral thecal sac. In addition, there is facet arthropathy with mild hypertrophic changes.  There is borderline central canal stenosis with  evidence of moderate left neural foraminal narrowing and mild right neural foramina narrowing.   L4-L5: There is broad-based protrud    Syncope 4/28/2017    Tinnitus of both ears 1/10/2017    Per ENT    Type 2 diabetes mellitus with stage 3 chronic kidney disease, with long-term current use of insulin ([facility] 12/26/2016    due to underlying condition with hyperosmolarity without coma    Type II or unspecified type diabetes mellitus without mention of complication, not stated as uncontrolled     uncontrolled    Vitamin D deficiency     Wears glasses     for reading       MEDICATIONS    Current Outpatient Medications on File Prior to Encounter   Medication Sig Dispense Refill    sulfacetamide (BLEPH-10) 10 % ophthalmic solution       cefUROXime (CEFTIN) 500 MG tablet Take 1 tablet by mouth 2 times daily for 10 days 20 tablet 0    mupirocin (BACTROBAN) 2 % ointment Apply topically 2 times daily on the affected area for 7-10 days. OK to substitute to cream 30 g 2    oxyCODONE HCl (OXY-IR) 10 MG immediate release tablet Take 1 tablet by mouth every 8 hours as needed for Pain for up to 30 days. 90 tablet 0    PROAIR  (90 Base) MCG/ACT inhaler INHALE TWO PUFFS BY MOUTH EVERY 6 HOURS AS NEEDED FOR WHEEZING OR FOR SHORTNESS OF BREATH 8.5 g 3    amitriptyline (ELAVIL) 50 MG tablet Take 2 po qhs 60 tablet 5    pravastatin (PRAVACHOL) 40 MG tablet Take 1 tablet by mouth nightly 90 tablet 3    nystatin (MYCOSTATIN) 677664 UNIT/GM powder Apply 2 times daily in the skin folds for several months 1 Bottle 3    escitalopram (LEXAPRO) 10 MG tablet Take 1 tablet by mouth daily 90 tablet 3    tiZANidine (ZANAFLEX) 4 MG tablet TAKE ONE TABLET BY MOUTH EVERY 8 HOURS AS NEEDED FOR BACK PAIN 90 tablet 5    bumetanide (BUMEX) 1 MG tablet TAKE THREE TABLETS BY MOUTH TWICE A  tablet 2    spironolactone (ALDACTONE) 50 MG tablet Take 1 tablet by mouth daily 90 tablet 3    vitamin D3 (CHOLECALCIFEROL) 25 MCG (1000 UT) TABS tablet Take 1 tablet by mouth daily 90 tablet 1    ammonium lactate (LAC-HYDRIN) 12 % lotion Apply topically daily.  For dry skin 1 Bottle 2    amitriptyline (ELAVIL) 25 MG tablet Take 1 po qhs x 2 days then 2 po qhs 60 tablet 3    clobetasol (TEMOVATE) 0.05 % ointment Apply topically 2 times daily for psoriasis 1 Tube 3    ketoconazole (NIZORAL) 2 % cream Apply twice a day for yeast infection in the skin folds, for 4 weeks 1 Tube 3    insulin regular human (HUMULIN R) 500 UNIT/ML concentrated injection vial Patient using 0.52ml with breakfast, 0.52ml with lunch and 0.45ml with dinner. 60 mL 3    albuterol (PROVENTIL) (2.5 MG/3ML) 0.083% nebulizer solution Take 3 mLs by nebulization every 6 hours as needed for Wheezing or Shortness of Breath 120 vial 3    pantoprazole (PROTONIX) 40 MG tablet Take 1 tablet by mouth every morning (before breakfast) 90 tablet 1    Ferrous Sulfate (IRON) 325 (65 Fe) MG TABS Take 1 tablet by mouth daily 90 tablet 3    metoprolol tartrate (LOPRESSOR) 50 MG tablet Take 1 tablet by mouth 2 times daily 180 tablet 3    magnesium oxide (MAG-OX) 400 (240 Mg) MG tablet Take 1 tablet by mouth daily 90 tablet 3    lisinopril (PRINIVIL;ZESTRIL) 5 MG tablet Take 1 tablet by mouth daily . Discontinued Lisinopril  10 mg 90 tablet 3    fluticasone-salmeterol (ADVAIR HFA) 230-21 MCG/ACT inhaler Inhale 2 puffs into the lungs 2 times daily 12 g 11    tiotropium (SPIRIVA RESPIMAT) 2.5 MCG/ACT AERS inhaler Inhale 2 puffs into the lungs daily 12 g 11    venlafaxine (EFFEXOR XR) 75 MG extended release capsule Take 1 capsule by mouth daily Take with food 90 capsule 3    naloxone 4 MG/0.1ML LIQD nasal spray 1 spray by Nasal route as needed for Opioid Reversal Due to COPD and sleep apnea, this is a big recommendation for you 1 each 5    clopidogrel (PLAVIX) 75 MG tablet TAKE ONE TABLET BY MOUTH DAILY 90 tablet 3    vitamin B-12 (CYANOCOBALAMIN) 500 MCG tablet Take 1 tablet by mouth daily 90 tablet 3    nitroGLYCERIN (NITROSTAT) 0.4 MG SL tablet Place 1 tablet under the tongue every 5 minutes as needed for Chest pain (and call 911) 25 tablet 3    Lidocaine 4 % LOTN Apply topically      metolazone (ZAROXOLYN) 2.5 MG tablet Take 2.5 mg by mouth three times a week MWF      fluticasone (CUTIVATE) 0.05 % cream Apply topically 2 times daily       fluticasone (FLONASE) 50 MCG/ACT nasal spray 2 sprays by Nasal route daily (Patient taking differently: 2 sprays by Nasal route daily as needed (sinus symptoms) ) 1 Bottle 3    Melatonin 10 MG TABS Take 10 mg by mouth nightly as needed (insomnia) 90 tablet 1    aspirin 81 MG EC tablet Take 81 mg by mouth daily.  Gauze Pads & Dressings 4\"X4\" PADS Twice a day dressing of right leg wound 60 each 5    Gauze Bandages (ROLLED GAUZE BANDAGE 4\"X2. 5YD) MISC For twice a day dressing 60 each 5    blood glucose monitor strips Test 3 times a day & as needed for symptoms of irregular blood glucose. Dispense sufficient amount for indicated testing frequency plus additional to accommodate PRN testing needs. One touch Ultra blue 300 strip 0    Lancets MISC Use to check blood sugar three times daily along with when necessary due to symptoms. 300 each 2    Insulin Syringe-Needle U-100 31G X 5/16\" 1 ML MISC Use to subcutaneously inject insulin three times daily 300 each 2    Oxygen Tubing MISC by Does not apply route DX COPD. chronic respiratory failure 1 each 0    Respiratory Therapy Supplies (NEBULIZER/TUBING/MOUTHPIECE) KIT Dx COPD needs nebulizer supplies 1 kit 11    ONE TOUCH ULTRASOFT LANCETS MISC Patient to test blood sugar up to 4 times daily. 300 each 3    Lancet Devices (LANCING DEVICE) MISC Provide patient with lancing device appropriate for his machine/lancing needles. 1 each 1    Spacer/Aero Chamber Mouthpiece MISC 1 each by Does not apply route once as needed (to be used with his inhalers) 1 each 0    Handicap Placard MISC by Does not apply route Can't walk greater than 200 feet. Expires in 5 years. 1 each 0     No current facility-administered medications on file prior to encounter. and mother; Kidney Disease in his father; Obesity in his sister. SOCIAL HISTORY    Social History     Tobacco Use    Smoking status: Former Smoker     Packs/day: 0.25     Years: 33.00     Pack years: 8.25     Types: Cigarettes     Start date: 1985     Quit date: 2020     Years since quittin.4    Smokeless tobacco: Former User     Types: Snuff     Quit date: 1995   Substance Use Topics    Alcohol use: No     Alcohol/week: 0.0 standard drinks    Drug use: Yes     Types: Marijuana     Comment: daily for anxiety       REVIEW OF SYSTEMS    Constitutional: negative, N/V/F/C/CP/SOB  Musculoskeletal:positive for right leg pain  Neurological: negative  numbness or tingling      Objective:      /64   Pulse 91   Temp 97 °F (36.1 °C) (Tympanic)   Resp 18       Wound:         Wound 21 Pretibial Right #1 (Active)   Wound Image   21   Dressing Status Old drainage noted 21   Wound Cleansed Cleansed with saline 21   Wound Length (cm) 3.9 cm 2136   Wound Width (cm) 4.5 cm 2136   Wound Depth (cm) 0.1 cm 2136   Wound Surface Area (cm^2) 17.55 cm^2 2136   Wound Volume (cm^3) 1.76 cm^3 2136   Post-Procedure Length (cm) 3.9 cm 2136   Post-Procedure Width (cm) 4.5 cm 2136   Post-Procedure Depth (cm) 0.1 cm 2136   Post-Procedure Surface Area (cm^2) 17.55 cm^2 2136   Post-Procedure Volume (cm^3) 1.76 cm^3 2136   Wound Assessment Devitalized tissue;Ruptured blister;Pink/red 21   Drainage Amount Moderate 21   Drainage Description Serosanguinous 21   Odor None 21   Terrie-wound Assessment Dry/flaky 21   Margins Defined edges 21   Number of days: 0       Integument: Skin is warm and dry with mild venous stasis noted to BL anterior legs. Wound noted to the anterior right leg measuring approximately 3.9 x 4.5 x 0.1 cm.  Base is fibrotic. Terrie wound skin is atrophic. Serous drainage noted with no malodor. No erythema with mild warmth. No probe to bone, sinus tract, undermining, fluctuance, crepitus, or induration. Vascular:  DP and PT pulses nonpalpable BLE. CFT <4 seconds to all digits BLE. Nonpitting edema noted BLE. Hairgrowth absent to all digits. Neurological: Sensation intact to light touch. Musculoskeletal: Strength 4/5 to all BLE groups. Pain to palpation to wound      Assessment:        1. Venous ulceration down to the layer of subcutaneous tissue, right leg    1. Chronic ulcer of leg, right, with fat layer exposed ([address]    2. Chronic ulcer of leg, left, with fat layer exposed ([address]    3. Lymphedema of both lower extremities    4.  Venous insufficiency of both lower extremities        Patient Active Problem List   Diagnosis    DDD (degenerative disc disease), lumbar    Otitis externa    Hypertriglyceridemia    CKD (chronic kidney disease) stage 3, GFR 30-59 ml/min    CAD (coronary artery disease) s/p 1 stent    Mixed type COPD (chronic obstructive pulmonary disease) (HCC)    Type 2 diabetes mellitus with diabetic polyneuropathy, with long-term current use of insulin (HCC)    Chronic pancreatitis (HCC)    Displacement of lumbar intervertebral disc without myelopathy    Chronic diastolic congestive heart failure (HCC)    Insomnia    BPH (benign prostatic hyperplasia)    Morbid obesity with BMI of 50.0-59.9, adult (HCC)    Lower abdominal pain    Hepatic steatosis    History of rib fracture    Umbilical hernia    Adrenal gland anomaly    Spinal stenosis of lumbar region without neurogenic claudication    Chronic back pain greater than 3 months duration    Gastroesophageal reflux disease without esophagitis    Hyperlipidemia with target LDL less than 70    Lower urinary tract symptoms (LUTS)    Vitamin D deficiency    Iron deficiency anemia due to chronic blood loss    BARBY treated with BiPAP    Chronic midline low back pain with left-sided sciatica    Stasis dermatitis of both legs    Anxiety    Intertrigo axillary b/l    History of recurrent ear infection    Anemia    Mixed conductive and sensorineural hearing loss of both ears    Tinnitus of both ears    Slow transit constipation    Chronic infection of both external ears    Tinea pedis of both feet    Onychomycosis    Moderate episode of recurrent major depressive disorder ([address]    Hydrocele, bilateral    Dry skin dermatitis legs    Celiac artery stenosis (HCC)    Myopia    Nuclear nonsenile cataract    Presbyopia    Postlaminectomy syndrome of lumbar region    Venous insufficiency of left lower extremity    Bilateral hearing loss    Seizures ([address]    Recurrent infection of skin    Vitamin B 12 deficiency    Mobility impaired    Chronic respiratory failure with hypoxia (HCC)    Chronic infective otitis externa    Benign hypertensive heart and CKD, stage 3 (GFR 30-59), w CHF (HCC)    Hypomagnesemia    Chronic malignant otitis externa of both ears    Psoriasis    Tinea corporis    Colon cancer screening declined    Excoriation    Chronic ulcer of leg, right, with fat layer exposed ([address]    Chronic ulcer of leg, left, with fat layer exposed ([address]    Lymphedema of both lower extremities    Venous insufficiency of both lower extremities         Plan:   Pt was evaluated and examined. Patient was given personalized discharge instructions. Diagnosis was discussed with the pt and all of their questions were answered in detail. Proper foot hygiene and care was discussed with the pt. Patient to check feet daily and contact the office with any questions/problems/concerns. Other comorbidity noted and will be managed by PCP.     Wound/Ulcer Descriptions are Pre Debridement except measurements:    Wound 04/27/21 Pretibial Right #1 (Active)   Wound Image   04/27/21 0836   Dressing Status Old drainage noted 04/27/21 0836   Wound Cleansed Cleansed with saline 04/27/21 0836   Wound Length (cm) 3.9 cm 04/27/21 0836   Wound Width (cm) 4.5 cm 04/27/21 0836   Wound Depth (cm) 0.1 cm 04/27/21 0836   Wound Surface Area (cm^2) 17.55 cm^2 04/27/21 0836   Wound Volume (cm^3) 1.76 cm^3 04/27/21 0836   Post-Procedure Length (cm) 3.9 cm 04/27/21 0836   Post-Procedure Width (cm) 4.5 cm 04/27/21 0836   Post-Procedure Depth (cm) 0.1 cm 04/27/21 0836   Post-Procedure Surface Area (cm^2) 17.55 cm^2 04/27/21 0836   Post-Procedure Volume (cm^3) 1.76 cm^3 04/27/21 0836   Wound Assessment Devitalized tissue;Ruptured blister;Pink/red 04/27/21 0836   Drainage Amount Moderate 04/27/21 0836   Drainage Description Serosanguinous 04/27/21 0836   Odor None 04/27/21 0836   Terrie-wound Assessment Dry/flaky 04/27/21 0836   Margins Defined edges 04/27/21 0836   Number of days: 0     Plan for wound  Dress per physician order  Treatment: ioplex foam to right leg, unna boots BLE     1. Continue antibiotics  2. Dressings: ioplex on right and Unna boots (BLE)  3. Follow up: 1 week. 5. Will obtain PVRs before next visit  4. Detailed home instructions and education material given to patient prior to discharge. Electronically signed by Lei Medeiros DPM on 4/27/2021 at 11:05 AM    I performed a history and physical examination of the patient and discussed management with the resident. I reviewed the residents note and agree with the documented findings and plan of care. Any areas of disagreement are noted on the chart. I was personally present for the key portions of any procedures. I have documented in the chart those procedures where I was not present during the key portions. I have reviewed the Podiatry Resident progress note. I agree with the chief complaint, past medical history, past surgical history, allergies, medications, social and family history as documented unless otherwise noted below.  Documentation of the HPI, Physical Exam and Medical Decision Making performed by medical students or scribes is based on my personal performance of the HPI, PE and MDM. I have personally evaluated this patient and have completed at least one if not all key elements of the E/M (history, physical exam, and MDM). Additional findings are as noted.      Cong Westfall DPM on 4/27/2021 at 96:55 PM  Board Certified, American Board of Podiatric Surgery  Fellow, [employer] and [employer] [facility]

## 2021-04-28 ENCOUNTER — APPOINTMENT (OUTPATIENT)
Dept: PHARMACY | Age: 56
End: 2021-04-28
Payer: COMMERCIAL

## 2021-04-30 ENCOUNTER — HOSPITAL ENCOUNTER (EMERGENCY)
Age: 56
Discharge: HOME | End: 2021-04-30
Attending: EMERGENCY MEDICINE
Payer: COMMERCIAL

## 2021-04-30 VITALS
WEIGHT: 315 LBS | SYSTOLIC BLOOD PRESSURE: 148 MMHG | HEIGHT: 72 IN | DIASTOLIC BLOOD PRESSURE: 47 MMHG | OXYGEN SATURATION: 99 % | BODY MASS INDEX: 42.66 KG/M2 | RESPIRATION RATE: 20 BRPM | TEMPERATURE: 97.9 F | HEART RATE: 96 BPM

## 2021-04-30 DIAGNOSIS — T14.8XXA WOUND INFECTION: Primary | ICD-10-CM

## 2021-04-30 DIAGNOSIS — L08.9 WOUND INFECTION: Primary | ICD-10-CM

## 2021-04-30 PROCEDURE — 6370000000 HC RX 637 (ALT 250 FOR IP): Performed by: EMERGENCY MEDICINE

## 2021-04-30 PROCEDURE — 99284 EMERGENCY DEPT VISIT MOD MDM: CPT

## 2021-04-30 RX ORDER — DOXYCYCLINE HYCLATE 100 MG
100 TABLET ORAL 2 TIMES DAILY
Qty: 20 TABLET | Refills: 0 | Status: SHIPPED | OUTPATIENT
Start: 2021-04-30 | End: 2021-05-10

## 2021-04-30 RX ORDER — DOXYCYCLINE 100 MG/1
100 CAPSULE ORAL ONCE
Status: COMPLETED | OUTPATIENT
Start: 2021-04-30 | End: 2021-04-30

## 2021-04-30 RX ADMIN — DOXYCYCLINE 100 MG: 100 CAPSULE ORAL at 21:52

## 2021-04-30 ASSESSMENT — ENCOUNTER SYMPTOMS
COUGH: 0
NAUSEA: 0
DIARRHEA: 0
ABDOMINAL PAIN: 0
VOMITING: 0
SHORTNESS OF BREATH: 0
CONSTIPATION: 0

## 2021-04-30 NOTE — ED TRIAGE NOTES
Mode of arrival (squad #, walk in, police, etc) : wheelchair        Chief complaint(s): wound        Arrival Note (brief scenario, treatment PTA, etc). : Pt was seen on Tuesday by Wound Care on Tuesday. Pt states they took care of the wound on his leg but today it feels swollen and painful. Pt did not unwrap bandages. Pt denies other sx at this time. Bandages intact, no drainage noted. C= \"Have you ever felt that you should Cut down on your drinking? \"  No  A= \"Have people Annoyed you by criticizing your drinking? \"  No  G= \"Have you ever felt bad or Guilty about your drinking? \"  No  E= \"Have you ever had a drink as an Eye-opener first thing in the morning to steady your nerves or to help a hangover? \"  No      Deferred []      Reason for deferring: N/A    *If yes to two or more: probable alcohol abuse. *

## 2021-05-01 NOTE — ED PROVIDER NOTES
respiratory failure (HCC)     was on vent    Chronic ulcer of left leg, with fat layer exposed ([address] 02/22/2019    healed    Class 2 severe obesity due to excess calories with serious comorbidity and body mass index (BMI) of 35.0 to 35.9 in adult (HCC)     (BMI 35.0-39.9 without comorbidity)    COPD exacerbation ([address] 11/2/2016    Diabetic neuropathy ([address] 8/14/2013    Displacement of lumbar intervertebral disc without myelopathy 6/13/2013    Ear infection     RIGHT    Essential hypertension     Facial cellulitis 2012    Fall 3/25/2017    GERD (gastroesophageal reflux disease)     Head injury     Hearing loss in right ear     pencil pierced ear as a child    Hepatic steatosis 12/3/2015    History of general anesthesia complication     has woke up during surgery under anesthesia    History of rib fracture 12/3/2015    Chronic     Hyperlipidemia     Hypersomnia     can go multiple days without sleeping    Hypertension     Insomnia     Intolerance of continuous positive airway pressure (CPAP) ventilation 7/20/2017    Iron deficiency     Localized rash     gets frequently in axilla, groin, in any fold, on several topical treatments for this    Magnesium deficiency     Mastoiditis of left side     Mixed conductive and sensorineural hearing loss of both ears 1/10/2017    Per ENT    Mixed type COPD (chronic obstructive pulmonary disease) ([address]     On home O2, multiple inhlaers, nebulizer    Moderate recurrent major depression ([address] 10/2/2016    Morbid obesity with BMI of 45.0-49.9, adult ([address] 6/16/2015    On home oxygen therapy     3 Lpm prn    Open wound of groin 12/19/2018    healed     BARBY on CPAP     Osteoarthritis     Otitis externa of left ear     Pancreatitis chronic     Persistent depressive disorder 11/19/2019    Renal insufficiency     proteinuria    Severe depression ([address] 9/25/2013    Spinal stenosis of lumbar region without neurogenic claudication 1/6/2016    MRI lumbar 12/30/15 L3-L4: There is broad-based bulging disc which appears protruding left laterally causing flattening of the ventral thecal sac. In addition, there is facet arthropathy with mild hypertrophic changes.  There is borderline central canal stenosis with  evidence of moderate left neural foraminal narrowing and mild right neural foramina narrowing.   L4-L5: There is broad-based protrud    Syncope 4/28/2017    Tinnitus of both ears 1/10/2017    Per ENT    Type 2 diabetes mellitus with stage 3 chronic kidney disease, with long-term current use of insulin ([facility] 12/26/2016    due to underlying condition with hyperosmolarity without coma    Type II or unspecified type diabetes mellitus without mention of complication, not stated as uncontrolled     uncontrolled    Vitamin D deficiency     Wears glasses     for reading       SURGICAL HISTORY       Past Surgical History:   Procedure Laterality Date    BACK SURGERY   (x 4) 2000,.12/2011.2/2012     Dr Karlie Austin last 2 [facility] CATHETERIZATION  04/23/2018    no stenting    COLONOSCOPY  11/3/2015    hemorrhoids, poor prep, not done    COLONOSCOPY  2013    COLONOSCOPY N/A 4/12/2021    COLONOSCOPY POLYPECTOMY SNARE/COLD BIOPSY/HOT BIOPSY/CLIP APPLICATION X1 performed by Dennys Frank MD at [address]  March 2013    x 1    HAND TENDON SURGERY Left     thumb tendon repair    INTRACAPSULAR CATARACT EXTRACTION Right 11/5/2019    EYE CATARACT EMULSIFICATION IOL IMPLANT performed by Leonila Mcrae MD at [address] [place] [address] Left 1/7/2020    EYE CATARACT EMULSIFICATION IOL IMPLANT performed by Leonila Mcrae MD at [facility] ARTHROSCOPY Left     NERVE BLOCK  07-31-15    TENS unit    [place] ESOPHAGOGASTRODUODENOSCOPY TRANSORAL DIAGNOSTIC N/A 7/18/2018    EGD ESOPHAGOGASTRODUODENOSCOPY performed by Dennys Frank MD at [facility] Bilateral 09/20/2012 Dr Chastity Shetty       Previous Medications    ALBUTEROL (PROVENTIL) (2.5 MG/3ML) 0.083% NEBULIZER SOLUTION    Take 3 mLs by nebulization every 6 hours as needed for Wheezing or Shortness of Breath    AMITRIPTYLINE (ELAVIL) 25 MG TABLET    Take 1 po qhs x 2 days then 2 po qhs    AMITRIPTYLINE (ELAVIL) 50 MG TABLET    Take 2 po qhs    AMMONIUM LACTATE (LAC-HYDRIN) 12 % LOTION    Apply topically daily. For dry skin    ASPIRIN 81 MG EC TABLET    Take 81 mg by mouth daily. BLOOD GLUCOSE MONITOR STRIPS    Test 3 times a day & as needed for symptoms of irregular blood glucose. Dispense sufficient amount for indicated testing frequency plus additional to accommodate PRN testing needs. One touch Ultra blue    BUMETANIDE (BUMEX) 1 MG TABLET    TAKE THREE TABLETS BY MOUTH TWICE A DAY    CEFUROXIME (CEFTIN) 500 MG TABLET    Take 1 tablet by mouth 2 times daily for 10 days    CLOBETASOL (TEMOVATE) 0.05 % OINTMENT    Apply topically 2 times daily for psoriasis    CLOPIDOGREL (PLAVIX) 75 MG TABLET    TAKE ONE TABLET BY MOUTH DAILY    ESCITALOPRAM (LEXAPRO) 10 MG TABLET    Take 1 tablet by mouth daily    FERROUS SULFATE (IRON) 325 (65 FE) MG TABS    Take 1 tablet by mouth daily    FLUTICASONE (CUTIVATE) 0.05 % CREAM    Apply topically 2 times daily     FLUTICASONE (FLONASE) 50 MCG/ACT NASAL SPRAY    2 sprays by Nasal route daily    FLUTICASONE-SALMETEROL (ADVAIR HFA) 230-21 MCG/ACT INHALER    Inhale 2 puffs into the lungs 2 times daily    GAUZE BANDAGES (ROLLED GAUZE BANDAGE 4\"X2. 5YD) MISC    For twice a day dressing    GAUZE PADS & DRESSINGS 4\"X4\" PADS    Twice a day dressing of right leg wound    HANDICAP PLACARD MISC    by Does not apply route Can't walk greater than 200 feet. Expires in 5 years. INSULIN REGULAR HUMAN (HUMULIN R) 500 UNIT/ML CONCENTRATED INJECTION VIAL    Patient using 0.52ml with breakfast, 0.52ml with lunch and 0.45ml with dinner.     INSULIN SYRINGE-NEEDLE U-100 31G X 5/16\" 1 ML MISC    Use to subcutaneously inject insulin three times daily    KETOCONAZOLE (NIZORAL) 2 % CREAM    Apply twice a day for yeast infection in the skin folds, for 4 weeks    LANCET DEVICES (LANCING DEVICE) MISC    Provide patient with lancing device appropriate for his machine/lancing needles. LANCETS MISC    Use to check blood sugar three times daily along with when necessary due to symptoms. LIDOCAINE 4 % LOTN    Apply topically    LISINOPRIL (PRINIVIL;ZESTRIL) 5 MG TABLET    Take 1 tablet by mouth daily . Discontinued Lisinopril  10 mg    MAGNESIUM OXIDE (MAG-OX) 400 (240 MG) MG TABLET    Take 1 tablet by mouth daily    MELATONIN 10 MG TABS    Take 10 mg by mouth nightly as needed (insomnia)    METOLAZONE (ZAROXOLYN) 2.5 MG TABLET    Take 2.5 mg by mouth three times a week MWF    METOPROLOL TARTRATE (LOPRESSOR) 50 MG TABLET    Take 1 tablet by mouth 2 times daily    MUPIROCIN (BACTROBAN) 2 % OINTMENT    Apply topically 2 times daily on the affected area for 7-10 days. OK to substitute to cream    NALOXONE 4 MG/0.1ML LIQD NASAL SPRAY    1 spray by Nasal route as needed for Opioid Reversal Due to COPD and sleep apnea, this is a big recommendation for you    NITROGLYCERIN (NITROSTAT) 0.4 MG SL TABLET    Place 1 tablet under the tongue every 5 minutes as needed for Chest pain (and call 911)    NYSTATIN (MYCOSTATIN) 045634 UNIT/GM POWDER    Apply 2 times daily in the skin folds for several months    ONE TOUCH ULTRASOFT LANCETS MISC    Patient to test blood sugar up to 4 times daily. OXYCODONE HCL (OXY-IR) 10 MG IMMEDIATE RELEASE TABLET    Take 1 tablet by mouth every 8 hours as needed for Pain for up to 30 days. OXYGEN TUBING MISC    by Does not apply route DX COPD.  chronic respiratory failure    PANTOPRAZOLE (PROTONIX) 40 MG TABLET    Take 1 tablet by mouth every morning (before breakfast)    PRAVASTATIN (PRAVACHOL) 40 MG TABLET    Take 1 tablet by mouth nightly    PROAIR  (90 BASE) MCG/ACT INHALER INHALE TWO PUFFS BY MOUTH EVERY 6 HOURS AS NEEDED FOR WHEEZING OR FOR SHORTNESS OF BREATH    RESPIRATORY THERAPY SUPPLIES (NEBULIZER/TUBING/MOUTHPIECE) KIT    Dx COPD needs nebulizer supplies    SPACER/AERO CHAMBER MOUTHPIECE MISC    1 each by Does not apply route once as needed (to be used with his inhalers)    SPIRONOLACTONE (ALDACTONE) 50 MG TABLET    Take 1 tablet by mouth daily    SULFACETAMIDE (BLEPH-10) 10 % OPHTHALMIC SOLUTION        TIOTROPIUM (SPIRIVA RESPIMAT) 2.5 MCG/ACT AERS INHALER    Inhale 2 puffs into the lungs daily    TIZANIDINE (ZANAFLEX) 4 MG TABLET    TAKE ONE TABLET BY MOUTH EVERY 8 HOURS AS NEEDED FOR BACK PAIN    VENLAFAXINE (EFFEXOR XR) 75 MG EXTENDED RELEASE CAPSULE    Take 1 capsule by mouth daily Take with food    VITAMIN B-12 (CYANOCOBALAMIN) 500 MCG TABLET    Take 1 tablet by mouth daily    VITAMIN D3 (CHOLECALCIFEROL) 25 MCG (1000 UT) TABS TABLET    Take 1 tablet by mouth daily       ALLERGIES     is allergic to levofloxacin; lorazepam; nsaids; prozac [fluoxetine hcl]; and vancomycin. SOCIAL HISTORY      reports that he quit smoking about 5 months ago. His smoking use included cigarettes. He started smoking about 35 years ago. He has a 8.25 pack-year smoking history. He quit smokeless tobacco use about 25 years ago. His smokeless tobacco use included snuff. He reports current drug use. Drug: Marijuana. He reports that he does not drink alcohol. PHYSICAL EXAM     INITIAL VITALS: BP (!) 148/47   Pulse 96   Temp 97.9 °F (36.6 °C) (Oral)   Resp 20   Ht 6' (1.829 m)   Wt (!) 404 lb (183.3 kg)   SpO2 99%   BMI 54.79 kg/m²      Physical Exam  Vitals signs and nursing note reviewed. Constitutional:       General: He is not in acute distress. Appearance: He is well-developed. He is not diaphoretic. HENT:      Head: Normocephalic and atraumatic. Eyes:      General: No scleral icterus. Right eye: No discharge. Left eye: No discharge. infection          DISPOSITION/PLAN   DISPOSITION Decision To Discharge 04/30/2021 09:19:34 PM      PATIENT REFERREDTO:  Erika Seay MD  [address] [facility]  [address]  [address] [place] [address]  [phone]    In 3 days      [facility] ED  [place] [id number]  [phone]    If symptoms worsen      DISCHARGEMEDICATIONS:  New Prescriptions    DOXYCYCLINE HYCLATE (VIBRA-TABS) 100 MG TABLET    Take 1 tablet by mouth 2 times daily for 10 days       (Please note that portions of this note were completed with a voice recognition program.  Efforts were made to edit thedictations but occasionally words are mis-transcribed.)    Trish Whitfield MD  Attending Emergency Physician                        Trish Whitfield MD  04/30/21 [id number]

## 2021-05-03 ENCOUNTER — OFFICE VISIT (OUTPATIENT)
Dept: GASTROENTEROLOGY | Age: 56
End: 2021-05-03
Payer: COMMERCIAL

## 2021-05-03 VITALS
HEART RATE: 85 BPM | OXYGEN SATURATION: 94 % | TEMPERATURE: 98.7 F | SYSTOLIC BLOOD PRESSURE: 122 MMHG | BODY MASS INDEX: 56.56 KG/M2 | WEIGHT: 315 LBS | DIASTOLIC BLOOD PRESSURE: 62 MMHG

## 2021-05-03 DIAGNOSIS — D36.9 TUBULAR ADENOMA: ICD-10-CM

## 2021-05-03 DIAGNOSIS — R19.7 DIARRHEA, UNSPECIFIED TYPE: Primary | ICD-10-CM

## 2021-05-03 PROCEDURE — G8417 CALC BMI ABV UP PARAM F/U: HCPCS | Performed by: INTERNAL MEDICINE

## 2021-05-03 PROCEDURE — 3017F COLORECTAL CA SCREEN DOC REV: CPT | Performed by: INTERNAL MEDICINE

## 2021-05-03 PROCEDURE — 1036F TOBACCO NON-USER: CPT | Performed by: INTERNAL MEDICINE

## 2021-05-03 PROCEDURE — 99214 OFFICE O/P EST MOD 30 MIN: CPT | Performed by: INTERNAL MEDICINE

## 2021-05-03 PROCEDURE — G8427 DOCREV CUR MEDS BY ELIG CLIN: HCPCS | Performed by: INTERNAL MEDICINE

## 2021-05-03 ASSESSMENT — ENCOUNTER SYMPTOMS
NAUSEA: 0
ANAL BLEEDING: 0
VOMITING: 0
TROUBLE SWALLOWING: 0
WHEEZING: 1
ABDOMINAL DISTENTION: 0
DIARRHEA: 1
CONSTIPATION: 0
BLOOD IN STOOL: 0
SHORTNESS OF BREATH: 1
CHOKING: 0
RECTAL PAIN: 0
COUGH: 1
ABDOMINAL PAIN: 1

## 2021-05-03 NOTE — PROGRESS NOTES
GI OFFICE FOLLOW UP    INTERVAL HISTORY:   Bridgett Alvarez MD  [address] [facility], [address]  [address] [facility],  [address] [place] [address]    Chief Complaint   Patient presents with    Follow-up     Patient is here today to f/u on colon       1. Diarrhea, unspecified type    2. Tubular adenoma              HISTORY OF PRESENT ILLNESS: Mauge Hawk is a 63 y.o. male with a past history remarkable for ,   Patient seen for follow-up of bowel habit change. He also noted to have recurrent colon polyps. Recent colonoscopy did not reveal 2 polyps and histology of this polyps revealed tubular adenomas. Random biopsies from the colon did not reveal microscopic colitis. During this visit patient and the family indicates that his loose bowels resolved. At present his bowel movements satisfactory without diarrhea, melena or hematochezia. No abdominal pain. Overall he is feeling better. He has of multiple medical issues including cellulitis of the lower extremities etc.      Past Medical,Family, and Social History reviewed and does contribute to the patient presenting condition. Patient's PMH/PSH,SH,PSYCH Hx, MEDs, ALLERGIES, and ROS were all reviewed and updated in the appropriate sections.  Yes      PAST MEDICAL HISTORY:  Past Medical History:   Diagnosis Date    Acute on chronic kidney failure ([address] 7/20/2017    Acute on chronic respiratory failure ([address] 10/2/2018    Adhesive capsulitis of left shoulder 3/25/2017    Anxiety 10/02/2016    smokes marijuana for this    Arthropathy, unspecified, other specified sites 6/13/2013    Asthma     B12 deficiency     Bilateral lower leg cellulitis 2/17/2016    Blood in stool     CAD (coronary artery disease)     Cellulitis of both lower extremities 5/25/2017    Cellulitis of leg, left 07/20/2017    CHF (congestive heart failure), NYHA class III ([address] 8/14/2013  Chronic back pain     Chronic bronchitis (HCC)     Chronic headaches     was referred to neuro, testing scheduled    Chronic respiratory failure (HCC)     was on vent    Chronic ulcer of left leg, with fat layer exposed ([address] 02/22/2019    healed    Class 2 severe obesity due to excess calories with serious comorbidity and body mass index (BMI) of 35.0 to 35.9 in adult (HCC)     (BMI 35.0-39.9 without comorbidity)    COPD exacerbation ([address] 11/2/2016    Diabetic neuropathy ([address] 8/14/2013    Displacement of lumbar intervertebral disc without myelopathy 6/13/2013    Ear infection     RIGHT    Essential hypertension     Facial cellulitis 2012    Fall 3/25/2017    GERD (gastroesophageal reflux disease)     Head injury     Hearing loss in right ear     pencil pierced ear as a child    Hepatic steatosis 12/3/2015    History of general anesthesia complication     has woke up during surgery under anesthesia    History of rib fracture 12/3/2015    Chronic     Hyperlipidemia     Hypersomnia     can go multiple days without sleeping    Hypertension     Insomnia     Intolerance of continuous positive airway pressure (CPAP) ventilation 7/20/2017    Iron deficiency     Localized rash     gets frequently in axilla, groin, in any fold, on several topical treatments for this    Magnesium deficiency     Mastoiditis of left side     Mixed conductive and sensorineural hearing loss of both ears 1/10/2017    Per ENT    Mixed type COPD (chronic obstructive pulmonary disease) ([address]     On home O2, multiple inhlaers, nebulizer    Moderate recurrent major depression ([address] 10/2/2016    Morbid obesity with BMI of 45.0-49.9, adult ([address] 6/16/2015    On home oxygen therapy     3 Lpm prn    Open wound of groin 12/19/2018    healed     BARBY on CPAP     Osteoarthritis     Otitis externa of left ear     Pancreatitis chronic     Persistent depressive disorder 11/19/2019    Renal insufficiency     proteinuria  Severe depression ([address] 9/25/2013    Spinal stenosis of lumbar region without neurogenic claudication 1/6/2016    MRI lumbar 12/30/15 L3-L4: There is broad-based bulging disc which appears protruding left laterally causing flattening of the ventral thecal sac. In addition, there is facet arthropathy with mild hypertrophic changes.  There is borderline central canal stenosis with  evidence of moderate left neural foraminal narrowing and mild right neural foramina narrowing.   L4-L5: There is broad-based protrud    Syncope 4/28/2017    Tinnitus of both ears 1/10/2017    Per ENT    Type 2 diabetes mellitus with stage 3 chronic kidney disease, with long-term current use of insulin ([address] 12/26/2016    due to underlying condition with hyperosmolarity without coma    Type II or unspecified type diabetes mellitus without mention of complication, not stated as uncontrolled     uncontrolled    Vitamin D deficiency     Wears glasses     for reading       Past Surgical History:   Procedure Laterality Date    BACK SURGERY   (x 4) 2000,.12/2011.2/2012     Dr Silver Piedra last [address]  04/23/2018    no stenting    COLONOSCOPY  11/3/2015    hemorrhoids, poor prep, not done    COLONOSCOPY  2013    COLONOSCOPY N/A 4/12/2021    COLONOSCOPY POLYPECTOMY SNARE/COLD BIOPSY/HOT BIOPSY/CLIP APPLICATION X1 performed by Alden Shaw MD at [address] [facility]  March 2013    x 1    HAND TENDON SURGERY Left     thumb tendon repair    INTRACAPSULAR CATARACT EXTRACTION Right 11/5/2019    EYE CATARACT EMULSIFICATION IOL IMPLANT performed by Keira Pina MD at [facility] [facility] Left 1/7/2020    EYE CATARACT EMULSIFICATION IOL IMPLANT performed by Keira Pina MD at [address] [facility] [address] ARTHROSCOPY Left     NERVE BLOCK  07-31-15    TENS unit    [place] ESOPHAGOGASTRODUODENOSCOPY TRANSORAL DIAGNOSTIC N/A 7/18/2018    EGD ESOPHAGOGASTRODUODENOSCOPY performed by Pia Verduzco MD at [facility] Bilateral 09/20/2012    Dr Sai Rhodes:    Current Outpatient Medications:     doxycycline hyclate (VIBRA-TABS) 100 MG tablet, Take 1 tablet by mouth 2 times daily for 10 days, Disp: 20 tablet, Rfl: 0    sulfacetamide (BLEPH-10) 10 % ophthalmic solution, , Disp: , Rfl:     cefUROXime (CEFTIN) 500 MG tablet, Take 1 tablet by mouth 2 times daily for 10 days, Disp: 20 tablet, Rfl: 0    mupirocin (BACTROBAN) 2 % ointment, Apply topically 2 times daily on the affected area for 7-10 days. OK to substitute to cream, Disp: 30 g, Rfl: 2    Gauze Pads & Dressings 4\"X4\" PADS, Twice a day dressing of right leg wound, Disp: 60 each, Rfl: 5    Gauze Bandages (ROLLED GAUZE BANDAGE 4\"X2. 5YD) MISC, For twice a day dressing, Disp: 60 each, Rfl: 5    oxyCODONE HCl (OXY-IR) 10 MG immediate release tablet, Take 1 tablet by mouth every 8 hours as needed for Pain for up to 30 days. , Disp: 90 tablet, Rfl: 0    PROAIR  (90 Base) MCG/ACT inhaler, INHALE TWO PUFFS BY MOUTH EVERY 6 HOURS AS NEEDED FOR WHEEZING OR FOR SHORTNESS OF BREATH, Disp: 8.5 g, Rfl: 3    amitriptyline (ELAVIL) 50 MG tablet, Take 2 po qhs, Disp: 60 tablet, Rfl: 5    pravastatin (PRAVACHOL) 40 MG tablet, Take 1 tablet by mouth nightly, Disp: 90 tablet, Rfl: 3    nystatin (MYCOSTATIN) 901227 UNIT/GM powder, Apply 2 times daily in the skin folds for several months, Disp: 1 Bottle, Rfl: 3    escitalopram (LEXAPRO) 10 MG tablet, Take 1 tablet by mouth daily, Disp: 90 tablet, Rfl: 3    tiZANidine (ZANAFLEX) 4 MG tablet, TAKE ONE TABLET BY MOUTH EVERY 8 HOURS AS NEEDED FOR BACK PAIN, Disp: 90 tablet, Rfl: 5    bumetanide (BUMEX) 1 MG tablet, TAKE THREE TABLETS BY MOUTH TWICE A DAY, Disp: 540 tablet, Rfl: 2    spironolactone (ALDACTONE) 50 MG tablet, Take 1 tablet by mouth daily, Disp: 90 tablet, Rfl: 3    vitamin D3 (CHOLECALCIFEROL) 25 and sleep apnea, this is a big recommendation for you, Disp: 1 each, Rfl: 5    blood glucose monitor strips, Test 3 times a day & as needed for symptoms of irregular blood glucose. Dispense sufficient amount for indicated testing frequency plus additional to accommodate PRN testing needs. One touch Ultra blue, Disp: 300 strip, Rfl: 0    Lancets MISC, Use to check blood sugar three times daily along with when necessary due to symptoms. , Disp: 300 each, Rfl: 2    Insulin Syringe-Needle U-100 31G X 5/16\" 1 ML MISC, Use to subcutaneously inject insulin three times daily, Disp: 300 each, Rfl: 2    clopidogrel (PLAVIX) 75 MG tablet, TAKE ONE TABLET BY MOUTH DAILY, Disp: 90 tablet, Rfl: 3    vitamin B-12 (CYANOCOBALAMIN) 500 MCG tablet, Take 1 tablet by mouth daily, Disp: 90 tablet, Rfl: 3    Oxygen Tubing MISC, by Does not apply route DX COPD. chronic respiratory failure, Disp: 1 each, Rfl: 0    Respiratory Therapy Supplies (NEBULIZER/TUBING/MOUTHPIECE) KIT, Dx COPD needs nebulizer supplies, Disp: 1 kit, Rfl: 11    nitroGLYCERIN (NITROSTAT) 0.4 MG SL tablet, Place 1 tablet under the tongue every 5 minutes as needed for Chest pain (and call 911), Disp: 25 tablet, Rfl: 3    ONE TOUCH ULTRASOFT LANCETS MISC, Patient to test blood sugar up to 4 times daily. , Disp: 300 each, Rfl: 3    Lancet Devices (LANCING DEVICE) MISC, Provide patient with lancing device appropriate for his machine/lancing needles. , Disp: 1 each, Rfl: 1    Lidocaine 4 % LOTN, Apply topically, Disp: , Rfl:     metolazone (ZAROXOLYN) 2.5 MG tablet, Take 2.5 mg by mouth three times a week MWF, Disp: , Rfl:     Handicap Placard MISC, by Does not apply route Can't walk greater than 200 feet. Expires in 5 years. , Disp: 1 each, Rfl: 0    fluticasone (CUTIVATE) 0.05 % cream, Apply topically 2 times daily , Disp: , Rfl:     fluticasone (FLONASE) 50 MCG/ACT nasal spray, 2 sprays by Nasal route daily (Patient taking differently: 2 sprays by Nasal route daily as needed (sinus symptoms) ), Disp: 1 Bottle, Rfl: 3    Melatonin 10 MG TABS, Take 10 mg by mouth nightly as needed (insomnia), Disp: 90 tablet, Rfl: 1    aspirin 81 MG EC tablet, Take 81 mg by mouth daily. , Disp: , Rfl:     Spacer/Aero Chamber Mouthpiece MISC, 1 each by Does not apply route once as needed (to be used with his inhalers), Disp: 1 each, Rfl: 0    ALLERGIES:   Allergies   Allergen Reactions    Levofloxacin Anaphylaxis     Patient reports needing epinephrine \"about 5 or 6 months ago\" for anaphylaxis (itching, hives, SOB/swelling) after receiving Levofloxacin. Previous report from 2012: Nausea/Vomiting    Lorazepam      Falls      Nsaids      CHF&CKD    Prozac [Fluoxetine Hcl] Other (See Comments)     Pt started with seizures after started taking.  Vancomycin Other (See Comments)     Itching, SOB, emesis upon infusion in ED 2019. Patient states he has had vancomycin \"a number of times\" before without issue. couldn't breath and talk, throat closed       FAMILY HISTORY:       Problem Relation Age of Onset    Heart Disease Mother          age 64 from MI    High Blood Pressure Mother     Diabetes Mother     High Blood Pressure Father          age 80 from CKD and Lung Fibrosis    Kidney Disease Father     Heart Disease Sister     Heart Attack Sister     Obesity Sister     Diabetes Sister          SOCIAL HISTORY:   Social History     Socioeconomic History    Marital status:      Spouse name: Not on file    Number of children: Not on file    Years of education: Not on file    Highest education level: Not on file   Occupational History    Occupation: disability     Comment: unemployed   Social Needs    Financial resource strain: Not on file    Food insecurity     Worry: Not on file     Inability: Not on file   [employer] needs     Medical: Not on file     Non-medical: Not on file   Tobacco Use    Smoking status: Former Smoker     Packs/day: 0.25     Years: 33.00     Pack years: 8.25     Types: Cigarettes     Start date: 1985     Quit date: 2020     Years since quittin.5    Smokeless tobacco: Former User     Types: Snuff     Quit date: 1995   Substance and Sexual Activity    Alcohol use: No     Alcohol/week: 0.0 standard drinks    Drug use: Yes     Types: Marijuana     Comment: daily for anxiety    Sexual activity: Not Currently   Lifestyle    Physical activity     Days per week: Not on file     Minutes per session: Not on file    Stress: Not on file   Relationships    Social connections     Talks on phone: Not on file     Gets together: Not on file     Attends Judaism service: Not on file     Active member of club or organization: Not on file     Attends meetings of clubs or organizations: Not on file     Relationship status: Not on file    Intimate partner violence     Fear of current or ex partner: Not on file     Emotionally abused: Not on file     Physically abused: Not on file     Forced sexual activity: Not on file   Other Topics Concern    Not on file   Social History Narrative    Middle of possible separation 2016              REVIEW OF SYSTEMS:         Review of Systems   Constitutional: Positive for fatigue and unexpected weight change. Negative for appetite change. HENT: Negative for trouble swallowing. Respiratory: Positive for cough, shortness of breath and wheezing. Negative for choking. Cardiovascular: Positive for chest pain and leg swelling. Negative for palpitations. Gastrointestinal: Positive for abdominal pain and diarrhea. Negative for abdominal distention, anal bleeding, blood in stool, constipation, nausea, rectal pain and vomiting. Genitourinary: Negative for difficulty urinating. Allergic/Immunologic: Negative for environmental allergies and food allergies. Neurological: Negative for dizziness, weakness, light-headedness, numbness and headaches.    Hematological: Bruises/bleeds easily. Psychiatric/Behavioral: Negative for sleep disturbance. The patient is not nervous/anxious. PHYSICAL EXAMINATION:     Vital signs reviewed per the nursing documentation. /62   Pulse 85   Temp 98.7 °F (37.1 °C)   Wt (!) 417 lb (189.1 kg)   SpO2 94%   BMI 56.56 kg/m²   Body mass index is 56.56 kg/m². Physical Exam  Vitals signs reviewed. Constitutional:       Appearance: Normal appearance. Comments: Patient is overweight. Uses wheelchair for ambulance. HENT:      Head: Normocephalic and atraumatic. Eyes:      Extraocular Movements: Extraocular movements intact. Conjunctiva/sclera: Conjunctivae normal.   Cardiovascular:      Rate and Rhythm: Normal rate and regular rhythm. Heart sounds: Normal heart sounds. Pulmonary:      Effort: Pulmonary effort is normal.      Breath sounds: Normal breath sounds. Abdominal:      General: Bowel sounds are normal. There is no distension. Palpations: Abdomen is soft. There is no mass. Tenderness: There is no abdominal tenderness. There is no guarding. Hernia: No hernia is present. Comments: Massively obese abdomen. Musculoskeletal:      Comments: Both lower extremities has Ace bandages. Skin:     General: Skin is warm and dry. Neurological:      General: No focal deficit present. Mental Status: He is alert and oriented to person, place, and time.    Psychiatric:         Mood and Affect: Mood normal.         Behavior: Behavior normal.           LABORATORY DATA: Reviewed  Lab Results   Component Value Date    WBC 9.7 02/23/2021    HGB 11.4 (L) 02/23/2021    HCT 35.0 (L) 02/23/2021    MCV 85.3 02/23/2021     02/23/2021     02/23/2021    K 4.6 02/23/2021     02/23/2021    CO2 25 02/23/2021    BUN 44 (H) 02/23/2021    CREATININE 1.94 (H) 02/23/2021    LABALBU 4.0 02/23/2021    BILITOT 0.24 (L) 02/23/2021    ALKPHOS 88 02/23/2021    AST 17 02/23/2021    ALT 18 02/23/2021    INR 1.0 06/23/2016         Lab Results   Component Value Date    RBC 4.10 (L) 02/23/2021    HGB 11.4 (L) 02/23/2021    MCV 85.3 02/23/2021    MCH 27.7 02/23/2021    MCHC 32.5 02/23/2021    RDW 15.8 (H) 02/23/2021    MPV 9.1 02/23/2021    BASOPCT 1 02/23/2021    LYMPHSABS 1.20 02/23/2021    MONOSABS 0.60 02/23/2021    NEUTROABS 7.50 02/23/2021    EOSABS 0.30 02/23/2021    BASOSABS 0.10 02/23/2021         DIAGNOSTIC TESTING:     No results found. Assessment  1. Diarrhea, unspecified type    2. Tubular adenoma        Plan    Discussed with the patient regarding colonoscopy findings and histology results. Patient is reassured. At present is a loose bowels resolved. Explained regarding possibility of mild IBS and symptomatic treatment. Advised to take Imodium intermittently if he has more than couple of bowel movements a day. If any further issues to contact me. Otherwise he may need a colonoscopy in 3 years. Thank you for allowing me to participate in the care of Mr. Clement Vivar. For any further questions please do not hesitate to contact me. I have reviewed and agree with the ROS entered by the MA/LPN. Note is dictated utilizing voice recognition software. Unfortunately this leads to occasional typographical errors.  Please contact our office if you have any questions        Ilya Herrera MD,FACP, [facility]  Board Certified in Gastroenterology and [address] [place] [address] Gastroenterology  Office #: [phone]

## 2021-05-07 ENCOUNTER — HOSPITAL ENCOUNTER (OUTPATIENT)
Dept: WOUND CARE | Age: 56
Discharge: HOME | End: 2021-05-07
Payer: COMMERCIAL

## 2021-05-07 VITALS
TEMPERATURE: 96.4 F | SYSTOLIC BLOOD PRESSURE: 116 MMHG | RESPIRATION RATE: 18 BRPM | HEART RATE: 89 BPM | DIASTOLIC BLOOD PRESSURE: 59 MMHG

## 2021-05-07 DIAGNOSIS — I73.9 PVD (PERIPHERAL VASCULAR DISEASE) (HCC): ICD-10-CM

## 2021-05-07 DIAGNOSIS — L97.911 NON-PRESSURE CHRONIC ULCER OF RIGHT LOWER LEG, LIMITED TO BREAKDOWN OF SKIN (HCC): ICD-10-CM

## 2021-05-07 DIAGNOSIS — I87.2 VENOUS INSUFFICIENCY OF BOTH LOWER EXTREMITIES: ICD-10-CM

## 2021-05-07 DIAGNOSIS — L97.922 CHRONIC ULCER OF LEG, LEFT, WITH FAT LAYER EXPOSED (HCC): ICD-10-CM

## 2021-05-07 DIAGNOSIS — I89.0 LYMPHEDEMA OF BOTH LOWER EXTREMITIES: ICD-10-CM

## 2021-05-07 DIAGNOSIS — E66.01 MORBID OBESITY WITH BMI OF 50.0-59.9, ADULT (HCC): ICD-10-CM

## 2021-05-07 DIAGNOSIS — E11.42 TYPE 2 DIABETES MELLITUS WITH DIABETIC POLYNEUROPATHY, WITH LONG-TERM CURRENT USE OF INSULIN (HCC): ICD-10-CM

## 2021-05-07 DIAGNOSIS — Z79.4 TYPE 2 DIABETES MELLITUS WITH DIABETIC POLYNEUROPATHY, WITH LONG-TERM CURRENT USE OF INSULIN (HCC): ICD-10-CM

## 2021-05-07 DIAGNOSIS — L97.912 CHRONIC ULCER OF LEG, RIGHT, WITH FAT LAYER EXPOSED (HCC): Primary | ICD-10-CM

## 2021-05-07 PROCEDURE — 29580 STRAPPING UNNA BOOT: CPT

## 2021-05-07 RX ORDER — BACITRACIN ZINC AND POLYMYXIN B SULFATE 500; 1000 [USP'U]/G; [USP'U]/G
OINTMENT TOPICAL ONCE
Status: CANCELLED | OUTPATIENT
Start: 2021-05-07 | End: 2021-05-07

## 2021-05-07 RX ORDER — BACITRACIN, NEOMYCIN, POLYMYXIN B 400; 3.5; 5 [USP'U]/G; MG/G; [USP'U]/G
OINTMENT TOPICAL ONCE
Status: CANCELLED | OUTPATIENT
Start: 2021-05-07 | End: 2021-05-07

## 2021-05-07 RX ORDER — BETAMETHASONE DIPROPIONATE 0.05 %
OINTMENT (GRAM) TOPICAL ONCE
Status: CANCELLED | OUTPATIENT
Start: 2021-05-07 | End: 2021-05-07

## 2021-05-07 RX ORDER — LIDOCAINE 40 MG/G
CREAM TOPICAL ONCE
Status: CANCELLED | OUTPATIENT
Start: 2021-05-07 | End: 2021-05-07

## 2021-05-07 RX ORDER — LIDOCAINE HYDROCHLORIDE 40 MG/ML
SOLUTION TOPICAL ONCE
Status: COMPLETED | OUTPATIENT
Start: 2021-05-07 | End: 2021-05-07

## 2021-05-07 RX ORDER — LIDOCAINE HYDROCHLORIDE 40 MG/ML
SOLUTION TOPICAL ONCE
Status: CANCELLED | OUTPATIENT
Start: 2021-05-07 | End: 2021-05-07

## 2021-05-07 RX ORDER — GENTAMICIN SULFATE 1 MG/G
OINTMENT TOPICAL ONCE
Status: CANCELLED | OUTPATIENT
Start: 2021-05-07 | End: 2021-05-07

## 2021-05-07 RX ORDER — LIDOCAINE HYDROCHLORIDE 20 MG/ML
JELLY TOPICAL ONCE
Status: CANCELLED | OUTPATIENT
Start: 2021-05-07 | End: 2021-05-07

## 2021-05-07 RX ORDER — GINSENG 100 MG
CAPSULE ORAL ONCE
Status: CANCELLED | OUTPATIENT
Start: 2021-05-07 | End: 2021-05-07

## 2021-05-07 RX ORDER — LIDOCAINE 50 MG/G
OINTMENT TOPICAL ONCE
Status: CANCELLED | OUTPATIENT
Start: 2021-05-07 | End: 2021-05-07

## 2021-05-07 RX ORDER — CLOBETASOL PROPIONATE 0.5 MG/G
OINTMENT TOPICAL ONCE
Status: CANCELLED | OUTPATIENT
Start: 2021-05-07 | End: 2021-05-07

## 2021-05-07 RX ADMIN — LIDOCAINE HYDROCHLORIDE 10 ML: 40 SOLUTION TOPICAL at 10:35

## 2021-05-07 ASSESSMENT — ENCOUNTER SYMPTOMS
VOMITING: 0
DIARRHEA: 0
NAUSEA: 0

## 2021-05-07 NOTE — PROGRESS NOTES
[employer] Application   Below Knee    NAME:  Wayne Renteria. YOB: 1965  MEDICAL RECORD NUMBER:  [id number]  DATE:  5/7/2021     [x] Removed old [employer] boot if indicated and wash leg with mild soap and water.  [x] Applied moisturizing agent to dry skin as needed.  [x] Appied primary and secondary dressing as ordered     [x] Applied Unna roll from toes to knee overlapping each time.  [x] Applied ace wrap or coban from toes to below the knee.  [x] Secured with tape and/or metal clips covered with tape.  [x] Instructed patient/caregiver to keep dressing dry and intact. DO NOT REMOVE DRESSING.  [x] Instructed pt/family/caregiver to report excessive draining, loose bandage, wet dressing, severe pain or tingling in toes.  [x] Applied [employer] dressing below the knee to Bilateral lower leg(s)        Unna Boot(s) were applied per  Guidelines.      Electronically signed by Alfonso Figueroa RN on 5/7/2021 at 11:12 AM

## 2021-05-07 NOTE — PROGRESS NOTES
to excess calories with serious comorbidity and body mass index (BMI) of 35.0 to 35.9 in adult (HCC)     (BMI 35.0-39.9 without comorbidity)    COPD exacerbation ([address] 11/2/2016    Diabetic neuropathy ([address] 8/14/2013    Displacement of lumbar intervertebral disc without myelopathy 6/13/2013    Ear infection     RIGHT    Essential hypertension     Facial cellulitis 2012    Fall 3/25/2017    GERD (gastroesophageal reflux disease)     Head injury     Hearing loss in right ear     pencil pierced ear as a child    Hepatic steatosis 12/3/2015    History of general anesthesia complication     has woke up during surgery under anesthesia    History of rib fracture 12/3/2015    Chronic     Hyperlipidemia     Hypersomnia     can go multiple days without sleeping    Hypertension     Insomnia     Intolerance of continuous positive airway pressure (CPAP) ventilation 7/20/2017    Iron deficiency     Localized rash     gets frequently in axilla, groin, in any fold, on several topical treatments for this    Magnesium deficiency     Mastoiditis of left side     Mixed conductive and sensorineural hearing loss of both ears 1/10/2017    Per ENT    Mixed type COPD (chronic obstructive pulmonary disease) ([address]     On home O2, multiple inhlaers, nebulizer    Moderate recurrent major depression ([address] 10/2/2016    Morbid obesity with BMI of 45.0-49.9, adult ([address] 6/16/2015    On home oxygen therapy     3 Lpm prn    Open wound of groin 12/19/2018    healed     BARBY on CPAP     Osteoarthritis     Otitis externa of left ear     Pancreatitis chronic     Persistent depressive disorder 11/19/2019    Renal insufficiency     proteinuria    Severe depression ([address] 9/25/2013    Spinal stenosis of lumbar region without neurogenic claudication 1/6/2016    MRI lumbar 12/30/15 L3-L4: There is broad-based bulging disc which appears protruding left laterally causing flattening of the ventral thecal sac.  In addition, there is facet arthropathy with mild hypertrophic changes.  There is borderline central canal stenosis with  evidence of moderate left neural foraminal narrowing and mild right neural foramina narrowing.   L4-L5: There is broad-based protrud    Syncope 2017    Tinnitus of both ears 1/10/2017    Per ENT    Type 2 diabetes mellitus with stage 3 chronic kidney disease, with long-term current use of insulin ([facility] 2016    due to underlying condition with hyperosmolarity without coma    Type II or unspecified type diabetes mellitus without mention of complication, not stated as uncontrolled     uncontrolled    Vitamin D deficiency     Wears glasses     for reading       PAST SURGICAL HISTORY    Past Surgical History:   Procedure Laterality Date    BACK SURGERY   (x 4) ,.2011.2012     Dr Grover Velázquez last [address]  2018    no stenting    COLONOSCOPY  11/3/2015    hemorrhoids, poor prep, not done    COLONOSCOPY  2013    COLONOSCOPY N/A 2021    COLONOSCOPY POLYPECTOMY SNARE/COLD BIOPSY/HOT BIOPSY/CLIP APPLICATION X1 performed by Pia Verduzco MD at [address] [facility]  March 2013    x 1    HAND TENDON SURGERY Left     thumb tendon repair    INTRACAPSULAR CATARACT EXTRACTION Right 2019    EYE CATARACT EMULSIFICATION IOL IMPLANT performed by Meli Vazquez MD at [address] [facility] Left 2020    EYE CATARACT EMULSIFICATION IOL IMPLANT performed by Meli Vazquez MD at [address] [facility] ARTHROSCOPY Left     NERVE BLOCK  15    TENS unit    DC ESOPHAGOGASTRODUODENOSCOPY TRANSORAL DIAGNOSTIC N/A 2018    EGD ESOPHAGOGASTRODUODENOSCOPY performed by Pia Verduzco MD at [address] TYMPANOMASTOIDECTOMY Bilateral 2012    Dr Olmos Cap History   Problem Relation Age of Onset    Heart Disease Mother          age 64 from [facility] Blood Pressure Mother     Diabetes Mother     High Blood Pressure Father          age 80 from CKD and Lung Fibrosis    Kidney Disease Father     Heart Disease Sister     Heart Attack Sister     Obesity Sister     Diabetes Sister        SOCIAL HISTORY    Social History     Tobacco Use    Smoking status: Former Smoker     Packs/day: 0.25     Years: 33.00     Pack years: 8.25     Types: Cigarettes     Start date: 1985     Quit date: 2020     Years since quittin.5    Smokeless tobacco: Former User     Types: Snuff     Quit date: 1995   Substance Use Topics    Alcohol use: No     Alcohol/week: 0.0 standard drinks    Drug use: Yes     Types: Marijuana     Comment: daily for anxiety       ALLERGIES    Allergies   Allergen Reactions    Levofloxacin Anaphylaxis     Patient reports needing epinephrine \"about 5 or 6 months ago\" for anaphylaxis (itching, hives, SOB/swelling) after receiving Levofloxacin. Previous report from 2012: Nausea/Vomiting    Lorazepam      Falls      Nsaids      CHF&CKD    Prozac [Fluoxetine Hcl] Other (See Comments)     Pt started with seizures after started taking.  Vancomycin Other (See Comments)     Itching, SOB, emesis upon infusion in ED 2019. Patient states he has had vancomycin \"a number of times\" before without issue. couldn't breath and talk, throat closed       MEDICATIONS    Current Outpatient Medications on File Prior to Encounter   Medication Sig Dispense Refill    doxycycline hyclate (VIBRA-TABS) 100 MG tablet Take 1 tablet by mouth 2 times daily for 10 days 20 tablet 0    sulfacetamide (BLEPH-10) 10 % ophthalmic solution       mupirocin (BACTROBAN) 2 % ointment Apply topically 2 times daily on the affected area for 7-10 days. OK to substitute to cream 30 g 2    Gauze Pads & Dressings 4\"X4\" PADS Twice a day dressing of right leg wound 60 each 5    Gauze Bandages (ROLLED GAUZE BANDAGE 4\"X2. 5YD) MISC For twice a day dressing 60 each 5    oxyCODONE HCl (OXY-IR) 10 MG immediate release tablet Take 1 tablet by mouth every 8 hours as needed for Pain for up to 30 days. 90 tablet 0    PROAIR  (90 Base) MCG/ACT inhaler INHALE TWO PUFFS BY MOUTH EVERY 6 HOURS AS NEEDED FOR WHEEZING OR FOR SHORTNESS OF BREATH 8.5 g 3    amitriptyline (ELAVIL) 50 MG tablet Take 2 po qhs 60 tablet 5    pravastatin (PRAVACHOL) 40 MG tablet Take 1 tablet by mouth nightly 90 tablet 3    nystatin (MYCOSTATIN) 011569 UNIT/GM powder Apply 2 times daily in the skin folds for several months 1 Bottle 3    escitalopram (LEXAPRO) 10 MG tablet Take 1 tablet by mouth daily 90 tablet 3    tiZANidine (ZANAFLEX) 4 MG tablet TAKE ONE TABLET BY MOUTH EVERY 8 HOURS AS NEEDED FOR BACK PAIN 90 tablet 5    bumetanide (BUMEX) 1 MG tablet TAKE THREE TABLETS BY MOUTH TWICE A  tablet 2    spironolactone (ALDACTONE) 50 MG tablet Take 1 tablet by mouth daily 90 tablet 3    vitamin D3 (CHOLECALCIFEROL) 25 MCG (1000 UT) TABS tablet Take 1 tablet by mouth daily 90 tablet 1    ammonium lactate (LAC-HYDRIN) 12 % lotion Apply topically daily. For dry skin 1 Bottle 2    amitriptyline (ELAVIL) 25 MG tablet Take 1 po qhs x 2 days then 2 po qhs 60 tablet 3    clobetasol (TEMOVATE) 0.05 % ointment Apply topically 2 times daily for psoriasis 1 Tube 3    ketoconazole (NIZORAL) 2 % cream Apply twice a day for yeast infection in the skin folds, for 4 weeks 1 Tube 3    insulin regular human (HUMULIN R) 500 UNIT/ML concentrated injection vial Patient using 0.52ml with breakfast, 0.52ml with lunch and 0.45ml with dinner.  60 mL 3    albuterol (PROVENTIL) (2.5 MG/3ML) 0.083% nebulizer solution Take 3 mLs by nebulization every 6 hours as needed for Wheezing or Shortness of Breath 120 vial 3    pantoprazole (PROTONIX) 40 MG tablet Take 1 tablet by mouth every morning (before breakfast) 90 tablet 1    Ferrous Sulfate (IRON) 325 (65 Fe) MG TABS Take 1 tablet by mouth daily 90 tablet 3  metoprolol tartrate (LOPRESSOR) 50 MG tablet Take 1 tablet by mouth 2 times daily 180 tablet 3    magnesium oxide (MAG-OX) 400 (240 Mg) MG tablet Take 1 tablet by mouth daily 90 tablet 3    lisinopril (PRINIVIL;ZESTRIL) 5 MG tablet Take 1 tablet by mouth daily . Discontinued Lisinopril  10 mg 90 tablet 3    fluticasone-salmeterol (ADVAIR HFA) 230-21 MCG/ACT inhaler Inhale 2 puffs into the lungs 2 times daily 12 g 11    tiotropium (SPIRIVA RESPIMAT) 2.5 MCG/ACT AERS inhaler Inhale 2 puffs into the lungs daily 12 g 11    venlafaxine (EFFEXOR XR) 75 MG extended release capsule Take 1 capsule by mouth daily Take with food 90 capsule 3    naloxone 4 MG/0.1ML LIQD nasal spray 1 spray by Nasal route as needed for Opioid Reversal Due to COPD and sleep apnea, this is a big recommendation for you 1 each 5    blood glucose monitor strips Test 3 times a day & as needed for symptoms of irregular blood glucose. Dispense sufficient amount for indicated testing frequency plus additional to accommodate PRN testing needs. One touch Ultra blue 300 strip 0    Lancets MISC Use to check blood sugar three times daily along with when necessary due to symptoms. 300 each 2    Insulin Syringe-Needle U-100 31G X 5/16\" 1 ML MISC Use to subcutaneously inject insulin three times daily 300 each 2    clopidogrel (PLAVIX) 75 MG tablet TAKE ONE TABLET BY MOUTH DAILY 90 tablet 3    vitamin B-12 (CYANOCOBALAMIN) 500 MCG tablet Take 1 tablet by mouth daily 90 tablet 3    Oxygen Tubing MISC by Does not apply route DX COPD. chronic respiratory failure 1 each 0    Respiratory Therapy Supplies (NEBULIZER/TUBING/MOUTHPIECE) KIT Dx COPD needs nebulizer supplies 1 kit 11    nitroGLYCERIN (NITROSTAT) 0.4 MG SL tablet Place 1 tablet under the tongue every 5 minutes as needed for Chest pain (and call 911) 25 tablet 3    ONE TOUCH ULTRASOFT LANCETS MISC Patient to test blood sugar up to 4 times daily.  300 each 3    Lancet Devices (LANCING DEVICE) MISC Provide patient with lancing device appropriate for his machine/lancing needles. 1 each 1    Lidocaine 4 % LOTN Apply topically      Spacer/Aero Chamber Mouthpiece MISC 1 each by Does not apply route once as needed (to be used with his inhalers) 1 each 0    metolazone (ZAROXOLYN) 2.5 MG tablet Take 2.5 mg by mouth three times a week MWF      Handicap Placard MISC by Does not apply route Can't walk greater than 200 feet. Expires in 5 years. 1 each 0    fluticasone (CUTIVATE) 0.05 % cream Apply topically 2 times daily       fluticasone (FLONASE) 50 MCG/ACT nasal spray 2 sprays by Nasal route daily (Patient taking differently: 2 sprays by Nasal route daily as needed (sinus symptoms) ) 1 Bottle 3    Melatonin 10 MG TABS Take 10 mg by mouth nightly as needed (insomnia) 90 tablet 1    aspirin 81 MG EC tablet Take 81 mg by mouth daily. No current facility-administered medications on file prior to encounter. REVIEW OF SYSTEMS    Review of Systems   Constitutional: Negative for chills and fever. Cardiovascular: Positive for leg swelling. Both legs chronic   Gastrointestinal: Negative for diarrhea, nausea and vomiting. Skin: Positive for wound. Color changes in both legs         Objective:      BP (!) 116/59   Pulse 89   Temp 96.4 °F (35.8 °C)   Resp 18     Wt Readings from Last 3 Encounters:   05/03/21 (!) 417 lb (189.1 kg)   04/30/21 (!) 404 lb (183.3 kg)   04/23/21 (!) 407 lb (184.6 kg)       Physical Exam:  General:  Alert and oriented x3. In no acute distress. Lower Extremity Physical Exam:    Vascular: DP pulses are not palpable, Bilateral. PT pulses are not palpable, Bilateral. CFT <3 seconds to all digits, Bilateral.  Nonpitting edema, Bilateral.  Hair growth is absent to the level of the lower leg, Bilateral.  Lipodermatosclerosis is present to both lower extremities. Neuro: Saph/sural/SP/DP/plantar sensation diminished to light touch.      Musculoskeletal: EHL/FHL/GS/TA gross motor intact. Gross deformity is absent. Dermatologic: Open wound present to right lateral lower leg as documented in detail below. Wound base is granular and limited to the dermal layer. Terrie-wound skin is atrophic. Negative probe to bone. There is no erythema. There is no purulent drainage. There is no fluctuance or crepitus.  Interdigital maceration absent, Bilateral.     Wound 04/27/21 Pretibial Right #1 (Active)   Wound Image   04/27/21 0836   Wound Etiology Venous 05/07/21 1032   Dressing Status Old drainage noted;New drainage noted 05/07/21 1032   Wound Cleansed Soap and water 05/07/21 1032   Dressing/Treatment Other (comment) 05/07/21 1111   Wound Length (cm) 0.2 cm 05/07/21 1032   Wound Width (cm) 0.2 cm 05/07/21 1032   Wound Depth (cm) 0.1 cm 05/07/21 1032   Wound Surface Area (cm^2) 0.04 cm^2 05/07/21 1032   Change in Wound Size % (l*w) 99.77 05/07/21 1032   Wound Volume (cm^3) 0 cm^3 05/07/21 1032   Wound Healing % 100 05/07/21 1032   Post-Procedure Length (cm) 0.2 cm 05/07/21 1032   Post-Procedure Width (cm) 0.2 cm 05/07/21 1032   Post-Procedure Depth (cm) 0.1 cm 05/07/21 1032   Post-Procedure Surface Area (cm^2) 0.04 cm^2 05/07/21 1032   Post-Procedure Volume (cm^3) 0 cm^3 05/07/21 1032   Wound Assessment Pink/red 05/07/21 1032   Drainage Amount Moderate 05/07/21 1032   Drainage Description Serosanguinous 05/07/21 1032   Odor None 05/07/21 1032   Terrie-wound Assessment Blanchable erythema 05/07/21 1032   Margins Attached edges 05/07/21 1032   Wound Thickness Description not for Pressure Injury Full thickness 05/07/21 1032   Number of days: 10            Assessment:      Active Hospital Problems    Diagnosis Date Noted    PVD (peripheral vascular disease) ([facility] [I73.9] 05/07/2021    Non-pressure chronic ulcer of right lower leg, limited to breakdown of skin [facility]) [L97.911] 04/27/2021    Lymphedema of both lower extremities [I89.0] 04/27/2021    Morbid obesity with BMI of 50. 0-59.9, adult ([facility] [E66.01, Z68.43] 06/16/2015    CKD (chronic kidney disease) stage 3, GFR 30-59 ml/min [N18.30] 05/08/2013    Type 2 diabetes mellitus with diabetic polyneuropathy, with long-term current use of insulin ([facility] [E11.42, Z79.4]        Plan:     Treatment Note please see attached Discharge Instructions    Previous wound care note and ER note reviewed    Educated on the importance of compression therapy for healing and prevention of re-ulceration in the setting of lymphedema. Patient states that he has lymphedema wraps at home and has just not been using them. We will continue Unna boot until next visit where he is asked to bring in his lymphedema wraps to be shown how to use them again. Continue doxycycline until course is complete    We will reorder the PVR without exercise so that it may be obtained    Consider referral to lymphedema clinic if no improvement with self wraps at home    Educated on signs and symptoms of infection. Instructed to call clinic immediately or go to ER if signs and symptoms of infection are present. RTC 1 week        Written patient discharge instructions given to patient and signed by patient or POA.       Orders Placed This Encounter   Medications    lidocaine (XYLOCAINE) 4 % external solution     Orders Placed This Encounter   Procedures    Initiate Outpatient Wound Care Protocol     Cleanse wound with saline    If wound contains bioburden or contamination cleanse with wound cleanser or antimicrobial solution     For normal periwound tissue without irritation nor maceration, apply topical skin protectant    For periwound tissue with irritation and/or maceration, apply zinc based product, topical steroid cream/ointment, or equivalent     For wounds with dry firm black eschar and/or without exudate, apply betadine and leave open to air      For wounds with scant/small to no exudate or drainage, apply wound gel, hydrocolloid, polymer, or equivalent and cover with secondary dressing/foam      For wounds with moderate/large exudate or drainage, apply alginate, hydrofiber, polymer, or equivalent and cover with secondary dressing/foam    For wounds with nonviable tissue requiring removal, apply chemical or mechanical debrider and cover with secondary dressing/foam    For wounds with tunneling, dead space, or cavity, fill or pack with strip/gauze/kerlex to fit and cover with secondary dressing/foam    For wounds with adequate granulation or epithelization, apply wound gel, hydrocolloid, polymer, collagen, or transparent film, and cover secondary dry dressing/foam    For wounds that need additional secondary dressing to help pad or control additional drainage/exudates, add foam, absorbent pad or hydrocolloid    For wounds with suspected or known infection, apply antimicrobial mesh and/or antimicrobial alginate/hydrofiber, or antimicrobial solution moistened gauze/kerlex, or equivalent and cover with secondary dressing/foam    Compression Management needed for edema control, apply multilayer compression or tubular garment or equivalent    Offloading Management needed for pressure relief, apply offloading shoe/boot or equivalent     Standing Status:   Standing     Number of Occurrences:   1          Discharge Instructions         [address] [place] and [address] [facility]                                 Visit  Discharge Instructions / Physician Orders  DATE: 05/07/21     Home Care:NONE     SUPPLIES ORDERED THRU:NONE     Wound Location:  Left lower leg                                  Right lower leg     Cleanse with: Liquid antibacterial soap and water, rinse well      Dressing Orders:  Primary dressing silvercel TO WOUND ON RIGHT LEG-WRAP BOTH LEGS IN UNNA BOOTS                      Secondary dressing                           secure with           x 30days     Frequency:  KEEP DRY AND INTACT     Additional Orders: Increase protein to diet (meat, cheese, eggs, fish, peanut butter, nuts and beans)  Multivitamin daily  ELEVATE LEGS WHEN SITTING  VASCULAR TESTING PVR WITH FRANKLYN COMPLETE without exercise- PRIOR TO COMING TO WOUND CARE SAME DAY  YOU MAY UNWRAP UNNA BOOTS FOR TESTING WHEN YOU COME TO WOUND CARE FOR YOUR APPOINTMENT WE WILL RE WRAP YOU. MY CHART []?     Smart Device  []?      HYPERBARIC TREATMENT-                HAGMRYPSG #     Your next appointment with the [address] [place] [address] is in 1 week                                                                                                   ROOM TYPE   []? CHAIR     []? BED   []? EITHER        TIME []? 45 MIN     []? 60 MIN     (Please note your next appointment above and if you are unable to keep, kindly give a 24 hour notice. Thank you.)     If you experience any of the following, please call the [address] [place] [address] during business hours:  [phone]     * Increase in Pain  * Temperature over 101  * Increase in drainage from your wound  * Drainage with a foul odor  * Bleeding  * Increase in swelling  * Need for compression bandage changes due to slippage, breakthrough drainage.     If you need medical attention outside of the business hours of the [address] [place] [address] please contact your PCP or go to the nearest emergency room. The information contained in the After Visit Summary has been reviewed with me, the patient and/or responsible adult, by my health care provider(s). I had the opportunity to ask questions regarding this information. I have elected to receive;      []? After Visit Summary  [x]? Comprehensive Discharge Instruction        Patient signature______________________________________Date:________  Electronically signed by Nidhi Dee DPM on 5/7/2021 at 10:40 AM  Electronically signed by Rubens Bray RN on 5/7/2021 at 11:06 AM          Electronically signed by Nidhi Dee DPM on 5/7/2021 at 11:13 AM

## 2021-05-07 NOTE — PLAN OF CARE
Problem: Pain:  Goal: Pain level will decrease  Description: Pain level will decrease  Outcome: Ongoing  Goal: Control of acute pain  Description: Control of acute pain  Outcome: Ongoing  Goal: Control of chronic pain  Description: Control of chronic pain  Outcome: Ongoing     Problem: Wound:  Goal: Will show signs of wound healing; wound closure and no evidence of infection  Description: Will show signs of wound healing; wound closure and no evidence of infection  Outcome: Ongoing     Problem: Weight control:  Goal: Ability to maintain an optimal weight for height and age will be supported  Description: Ability to maintain an optimal weight for height and age will be supported  Outcome: Ongoing     Problem: Falls - Risk of:  Goal: Will remain free from falls  Description: Will remain free from falls  Outcome: Ongoing

## 2021-05-10 ENCOUNTER — HOSPITAL ENCOUNTER (OUTPATIENT)
Dept: VASCULAR LAB | Age: 56
Discharge: HOME | End: 2021-05-10
Payer: COMMERCIAL

## 2021-05-10 DIAGNOSIS — L97.922 CHRONIC ULCER OF LEG, LEFT, WITH FAT LAYER EXPOSED (HCC): ICD-10-CM

## 2021-05-10 DIAGNOSIS — I73.9 PVD (PERIPHERAL VASCULAR DISEASE) (HCC): ICD-10-CM

## 2021-05-10 DIAGNOSIS — I89.0 LYMPHEDEMA OF BOTH LOWER EXTREMITIES: ICD-10-CM

## 2021-05-10 DIAGNOSIS — Z79.4 TYPE 2 DIABETES MELLITUS WITH DIABETIC POLYNEUROPATHY, WITH LONG-TERM CURRENT USE OF INSULIN (HCC): ICD-10-CM

## 2021-05-10 DIAGNOSIS — L97.912 CHRONIC ULCER OF LEG, RIGHT, WITH FAT LAYER EXPOSED (HCC): ICD-10-CM

## 2021-05-10 DIAGNOSIS — E11.42 TYPE 2 DIABETES MELLITUS WITH DIABETIC POLYNEUROPATHY, WITH LONG-TERM CURRENT USE OF INSULIN (HCC): ICD-10-CM

## 2021-05-10 PROCEDURE — 93923 UPR/LXTR ART STDY 3+ LVLS: CPT

## 2021-05-12 ENCOUNTER — TELEPHONE (OUTPATIENT)
Dept: PHARMACY | Age: 56
End: 2021-05-12

## 2021-05-12 NOTE — TELEPHONE ENCOUNTER
Patient called to inform us he started using a [employer] CGM yesterday and wanted to know if we could download information from his meter. Explained that we can download information and get very useful reports. Explained importance of patient bringing his  and cord with him to appt on 5/19 to allow us to connect  to computer to allow download. Yesenia Rizvi,Pharm. D,, BCPS, [facility]  5/12/2021  10:43 AM

## 2021-05-14 ENCOUNTER — HOSPITAL ENCOUNTER (OUTPATIENT)
Dept: WOUND CARE | Age: 56
Discharge: HOME | End: 2021-05-14
Payer: COMMERCIAL

## 2021-05-14 VITALS
TEMPERATURE: 97.4 F | DIASTOLIC BLOOD PRESSURE: 59 MMHG | HEART RATE: 89 BPM | RESPIRATION RATE: 18 BRPM | SYSTOLIC BLOOD PRESSURE: 106 MMHG

## 2021-05-14 DIAGNOSIS — I87.2 VENOUS INSUFFICIENCY OF BOTH LOWER EXTREMITIES: ICD-10-CM

## 2021-05-14 DIAGNOSIS — I89.0 LYMPHEDEMA OF BOTH LOWER EXTREMITIES: ICD-10-CM

## 2021-05-14 DIAGNOSIS — N18.31 STAGE 3A CHRONIC KIDNEY DISEASE (HCC): ICD-10-CM

## 2021-05-14 DIAGNOSIS — Z79.4 TYPE 2 DIABETES MELLITUS WITH DIABETIC POLYNEUROPATHY, WITH LONG-TERM CURRENT USE OF INSULIN (HCC): ICD-10-CM

## 2021-05-14 DIAGNOSIS — L97.911 NON-PRESSURE CHRONIC ULCER OF RIGHT LOWER LEG, LIMITED TO BREAKDOWN OF SKIN (HCC): Primary | ICD-10-CM

## 2021-05-14 DIAGNOSIS — E11.42 TYPE 2 DIABETES MELLITUS WITH DIABETIC POLYNEUROPATHY, WITH LONG-TERM CURRENT USE OF INSULIN (HCC): ICD-10-CM

## 2021-05-14 DIAGNOSIS — L97.922 CHRONIC ULCER OF LEG, LEFT, WITH FAT LAYER EXPOSED (HCC): ICD-10-CM

## 2021-05-14 DIAGNOSIS — E66.01 MORBID OBESITY WITH BMI OF 50.0-59.9, ADULT (HCC): ICD-10-CM

## 2021-05-14 DIAGNOSIS — I73.9 PVD (PERIPHERAL VASCULAR DISEASE) (HCC): ICD-10-CM

## 2021-05-14 PROCEDURE — 99212 OFFICE O/P EST SF 10 MIN: CPT

## 2021-05-14 RX ORDER — LIDOCAINE HYDROCHLORIDE 40 MG/ML
SOLUTION TOPICAL ONCE
Status: CANCELLED | OUTPATIENT
Start: 2021-05-14 | End: 2021-05-14

## 2021-05-14 RX ORDER — BETAMETHASONE DIPROPIONATE 0.05 %
OINTMENT (GRAM) TOPICAL ONCE
Status: CANCELLED | OUTPATIENT
Start: 2021-05-14 | End: 2021-05-14

## 2021-05-14 RX ORDER — LIDOCAINE 40 MG/G
CREAM TOPICAL ONCE
Status: CANCELLED | OUTPATIENT
Start: 2021-05-14 | End: 2021-05-14

## 2021-05-14 RX ORDER — BACITRACIN ZINC AND POLYMYXIN B SULFATE 500; 1000 [USP'U]/G; [USP'U]/G
OINTMENT TOPICAL ONCE
Status: CANCELLED | OUTPATIENT
Start: 2021-05-14 | End: 2021-05-14

## 2021-05-14 RX ORDER — BACITRACIN, NEOMYCIN, POLYMYXIN B 400; 3.5; 5 [USP'U]/G; MG/G; [USP'U]/G
OINTMENT TOPICAL ONCE
Status: CANCELLED | OUTPATIENT
Start: 2021-05-14 | End: 2021-05-14

## 2021-05-14 RX ORDER — AMMONIUM LACTATE 12 G/100G
CREAM TOPICAL
Qty: 1 BOTTLE | Refills: 4 | Status: SHIPPED | OUTPATIENT
Start: 2021-05-14 | End: 2021-09-30

## 2021-05-14 RX ORDER — CLOBETASOL PROPIONATE 0.5 MG/G
OINTMENT TOPICAL ONCE
Status: CANCELLED | OUTPATIENT
Start: 2021-05-14 | End: 2021-05-14

## 2021-05-14 RX ORDER — LIDOCAINE HYDROCHLORIDE 20 MG/ML
JELLY TOPICAL ONCE
Status: CANCELLED | OUTPATIENT
Start: 2021-05-14 | End: 2021-05-14

## 2021-05-14 RX ORDER — GENTAMICIN SULFATE 1 MG/G
OINTMENT TOPICAL ONCE
Status: CANCELLED | OUTPATIENT
Start: 2021-05-14 | End: 2021-05-14

## 2021-05-14 RX ORDER — GINSENG 100 MG
CAPSULE ORAL ONCE
Status: CANCELLED | OUTPATIENT
Start: 2021-05-14 | End: 2021-05-14

## 2021-05-14 RX ORDER — LIDOCAINE 50 MG/G
OINTMENT TOPICAL ONCE
Status: CANCELLED | OUTPATIENT
Start: 2021-05-14 | End: 2021-05-14

## 2021-05-14 RX ORDER — LIDOCAINE HYDROCHLORIDE 40 MG/ML
SOLUTION TOPICAL ONCE
Status: DISCONTINUED | OUTPATIENT
Start: 2021-05-14 | End: 2021-05-14

## 2021-05-14 ASSESSMENT — ENCOUNTER SYMPTOMS
ROS SKIN COMMENTS: DRY SKIN
VOMITING: 0
DIARRHEA: 0
NAUSEA: 0

## 2021-05-14 NOTE — PLAN OF CARE
Problem: Pain:  Goal: Pain level will decrease  Description: Pain level will decrease  Outcome: Completed  Goal: Control of acute pain  Description: Control of acute pain  Outcome: Completed  Goal: Control of chronic pain  Description: Control of chronic pain  Outcome: Completed     Problem: Wound:  Goal: Will show signs of wound healing; wound closure and no evidence of infection  Description: Will show signs of wound healing; wound closure and no evidence of infection  Outcome: Completed     Problem: Weight control:  Goal: Ability to maintain an optimal weight for height and age will be supported  Description: Ability to maintain an optimal weight for height and age will be supported  Outcome: Completed     Problem: Falls - Risk of:  Goal: Will remain free from falls  Description: Will remain free from falls  Outcome: Completed

## 2021-05-14 NOTE — PROGRESS NOTES
[facility])     Chronic headaches     was referred to neuro, testing scheduled    Chronic respiratory failure ([address]     was on vent    Chronic ulcer of left leg, with fat layer exposed ([address] 02/22/2019    healed    Class 2 severe obesity due to excess calories with serious comorbidity and body mass index (BMI) of 35.0 to 35.9 in adult (HCC)     (BMI 35.0-39.9 without comorbidity)    COPD exacerbation ([address] 11/2/2016    Diabetic neuropathy ([address] 8/14/2013    Displacement of lumbar intervertebral disc without myelopathy 6/13/2013    Ear infection     RIGHT    Essential hypertension     Facial cellulitis 2012    Fall 3/25/2017    GERD (gastroesophageal reflux disease)     Head injury     Hearing loss in right ear     pencil pierced ear as a child    Hepatic steatosis 12/3/2015    History of general anesthesia complication     has woke up during surgery under anesthesia    History of rib fracture 12/3/2015    Chronic     Hyperlipidemia     Hypersomnia     can go multiple days without sleeping    Hypertension     Insomnia     Intolerance of continuous positive airway pressure (CPAP) ventilation 7/20/2017    Iron deficiency     Localized rash     gets frequently in axilla, groin, in any fold, on several topical treatments for this    Magnesium deficiency     Mastoiditis of left side     Mixed conductive and sensorineural hearing loss of both ears 1/10/2017    Per ENT    Mixed type COPD (chronic obstructive pulmonary disease) ([address]     On home O2, multiple inhlaers, nebulizer    Moderate recurrent major depression ([address] 10/2/2016    Morbid obesity with BMI of 45.0-49.9, adult ([address] 6/16/2015    On home oxygen therapy     3 Lpm prn    Open wound of groin 12/19/2018    healed     BARBY on CPAP     Osteoarthritis     Otitis externa of left ear     Pancreatitis chronic     Persistent depressive disorder 11/19/2019    Renal insufficiency     proteinuria    Severe depression ([address] 9/25/2013    Spinal stenosis of lumbar region without neurogenic claudication 1/6/2016    MRI lumbar 12/30/15 L3-L4: There is broad-based bulging disc which appears protruding left laterally causing flattening of the ventral thecal sac. In addition, there is facet arthropathy with mild hypertrophic changes.  There is borderline central canal stenosis with  evidence of moderate left neural foraminal narrowing and mild right neural foramina narrowing.   L4-L5: There is broad-based protrud    Syncope 4/28/2017    Tinnitus of both ears 1/10/2017    Per ENT    Type 2 diabetes mellitus with stage 3 chronic kidney disease, with long-term current use of insulin ([facility] 12/26/2016    due to underlying condition with hyperosmolarity without coma    Type II or unspecified type diabetes mellitus without mention of complication, not stated as uncontrolled     uncontrolled    Vitamin D deficiency     Wears glasses     for reading       PAST SURGICAL HISTORY    Past Surgical History:   Procedure Laterality Date    BACK SURGERY   (x 4) 2000,.12/2011.2/2012     Dr Jennings Ma last [address]  04/23/2018    no stenting    COLONOSCOPY  11/3/2015    hemorrhoids, poor prep, not done    COLONOSCOPY  2013    COLONOSCOPY N/A 4/12/2021    COLONOSCOPY POLYPECTOMY SNARE/COLD BIOPSY/HOT BIOPSY/CLIP APPLICATION X1 performed by Ninfa Guerin MD at [address]  March 2013    x 1    HAND TENDON SURGERY Left     thumb tendon repair    INTRACAPSULAR CATARACT EXTRACTION Right 11/5/2019    EYE CATARACT EMULSIFICATION IOL IMPLANT performed by Marcus Lemus MD at [facility] Left 1/7/2020    EYE CATARACT EMULSIFICATION IOL IMPLANT performed [facility] MD at [facility] ARTHROSCOPY Left     NERVE BLOCK  07-31-15    TENS unit    ID ESOPHAGOGASTRODUODENOSCOPY TRANSORAL DIAGNOSTIC N/A 7/18/2018    EGD ESOPHAGOGASTRODUODENOSCOPY performed by Damaso Mcwilliams MD at [address] TYMPANOMASTOIDECTOMY Bilateral 2012    Dr Shelton Iron History   Problem Relation Age of Onset    Heart Disease Mother          age 64 from MI    High Blood Pressure Mother     Diabetes Mother     High Blood Pressure Father          age 80 from CKD and Lung Fibrosis    Kidney Disease Father     Heart Disease Sister     Heart Attack Sister     Obesity Sister     Diabetes Sister        SOCIAL HISTORY    Social History     Tobacco Use    Smoking status: Former Smoker     Packs/day: 0.25     Years: 33.00     Pack years: 8.25     Types: Cigarettes     Start date: 1985     Quit date: 2020     Years since quittin.5    Smokeless tobacco: Former User     Types: Snuff     Quit date: 1995   Substance Use Topics    Alcohol use: No     Alcohol/week: 0.0 standard drinks    Drug use: Yes     Types: Marijuana     Comment: daily for anxiety       ALLERGIES    Allergies   Allergen Reactions    Levofloxacin Anaphylaxis     Patient reports needing epinephrine \"about 5 or 6 months ago\" for anaphylaxis (itching, hives, SOB/swelling) after receiving Levofloxacin. Previous report from 2012: Nausea/Vomiting    Lorazepam      Falls      Nsaids      CHF&CKD    Prozac [Fluoxetine Hcl] Other (See Comments)     Pt started with seizures after started taking.  Vancomycin Other (See Comments)     Itching, SOB, emesis upon infusion in ED 2019. Patient states he has had vancomycin \"a number of times\" before without issue. couldn't breath and talk, throat closed       MEDICATIONS    Current Outpatient Medications on File Prior to Encounter   Medication Sig Dispense Refill    sulfacetamide (BLEPH-10) 10 % ophthalmic solution       mupirocin (BACTROBAN) 2 % ointment Apply topically 2 times daily on the affected area for 7-10 days.  OK to substitute to cream 30 g 2    Gauze Pads & Dressings 4\"X4\" PADS Twice a day dressing of right leg wound 60 each 5    Gauze Bandages (ROLLED GAUZE BANDAGE 4\"X2. 5YD) MISC For twice a day dressing 60 each 5    oxyCODONE HCl (OXY-IR) 10 MG immediate release tablet Take 1 tablet by mouth every 8 hours as needed for Pain for up to 30 days. 90 tablet 0    PROAIR  (90 Base) MCG/ACT inhaler INHALE TWO PUFFS BY MOUTH EVERY 6 HOURS AS NEEDED FOR WHEEZING OR FOR SHORTNESS OF BREATH 8.5 g 3    amitriptyline (ELAVIL) 50 MG tablet Take 2 po qhs 60 tablet 5    pravastatin (PRAVACHOL) 40 MG tablet Take 1 tablet by mouth nightly 90 tablet 3    nystatin (MYCOSTATIN) 889323 UNIT/GM powder Apply 2 times daily in the skin folds for several months 1 Bottle 3    escitalopram (LEXAPRO) 10 MG tablet Take 1 tablet by mouth daily 90 tablet 3    tiZANidine (ZANAFLEX) 4 MG tablet TAKE ONE TABLET BY MOUTH EVERY 8 HOURS AS NEEDED FOR BACK PAIN 90 tablet 5    bumetanide (BUMEX) 1 MG tablet TAKE THREE TABLETS BY MOUTH TWICE A  tablet 2    spironolactone (ALDACTONE) 50 MG tablet Take 1 tablet by mouth daily 90 tablet 3    vitamin D3 (CHOLECALCIFEROL) 25 MCG (1000 UT) TABS tablet Take 1 tablet by mouth daily 90 tablet 1    ammonium lactate (LAC-HYDRIN) 12 % lotion Apply topically daily. For dry skin 1 Bottle 2    amitriptyline (ELAVIL) 25 MG tablet Take 1 po qhs x 2 days then 2 po qhs 60 tablet 3    clobetasol (TEMOVATE) 0.05 % ointment Apply topically 2 times daily for psoriasis 1 Tube 3    ketoconazole (NIZORAL) 2 % cream Apply twice a day for yeast infection in the skin folds, for 4 weeks 1 Tube 3    insulin regular human (HUMULIN R) 500 UNIT/ML concentrated injection vial Patient using 0.52ml with breakfast, 0.52ml with lunch and 0.45ml with dinner.  60 mL 3    albuterol (PROVENTIL) (2.5 MG/3ML) 0.083% nebulizer solution Take 3 mLs by nebulization every 6 hours as needed for Wheezing or Shortness of Breath 120 vial 3    pantoprazole (PROTONIX) 40 MG tablet Take 1 tablet by mouth every morning (before breakfast) 90 tablet 1    Ferrous Sulfate (IRON) 325 (65 Fe) MG TABS Take 1 tablet by mouth daily 90 tablet 3    metoprolol tartrate (LOPRESSOR) 50 MG tablet Take 1 tablet by mouth 2 times daily 180 tablet 3    magnesium oxide (MAG-OX) 400 (240 Mg) MG tablet Take 1 tablet by mouth daily 90 tablet 3    lisinopril (PRINIVIL;ZESTRIL) 5 MG tablet Take 1 tablet by mouth daily . Discontinued Lisinopril  10 mg 90 tablet 3    fluticasone-salmeterol (ADVAIR HFA) 230-21 MCG/ACT inhaler Inhale 2 puffs into the lungs 2 times daily 12 g 11    tiotropium (SPIRIVA RESPIMAT) 2.5 MCG/ACT AERS inhaler Inhale 2 puffs into the lungs daily 12 g 11    venlafaxine (EFFEXOR XR) 75 MG extended release capsule Take 1 capsule by mouth daily Take with food 90 capsule 3    naloxone 4 MG/0.1ML LIQD nasal spray 1 spray by Nasal route as needed for Opioid Reversal Due to COPD and sleep apnea, this is a big recommendation for you 1 each 5    blood glucose monitor strips Test 3 times a day & as needed for symptoms of irregular blood glucose. Dispense sufficient amount for indicated testing frequency plus additional to accommodate PRN testing needs. One touch Ultra blue 300 strip 0    Lancets MISC Use to check blood sugar three times daily along with when necessary due to symptoms. 300 each 2    Insulin Syringe-Needle U-100 31G X 5/16\" 1 ML MISC Use to subcutaneously inject insulin three times daily 300 each 2    clopidogrel (PLAVIX) 75 MG tablet TAKE ONE TABLET BY MOUTH DAILY 90 tablet 3    vitamin B-12 (CYANOCOBALAMIN) 500 MCG tablet Take 1 tablet by mouth daily 90 tablet 3    Oxygen Tubing MISC by Does not apply route DX COPD.  chronic respiratory failure 1 each 0    Respiratory Therapy Supplies (NEBULIZER/TUBING/MOUTHPIECE) KIT Dx COPD needs nebulizer supplies 1 kit 11    nitroGLYCERIN (NITROSTAT) 0.4 MG SL tablet Place 1 tablet under the tongue every 5 minutes as needed for Chest pain (and call 911) 25 tablet 3    ONE TOUCH ULTRASOFT LANCETS MISC Patient to test blood sugar up to 4 times daily. 300 each 3    Lancet Devices (LANCING DEVICE) MISC Provide patient with lancing device appropriate for his machine/lancing needles. 1 each 1    Lidocaine 4 % LOTN Apply topically      Spacer/Aero Chamber Mouthpiece MISC 1 each by Does not apply route once as needed (to be used with his inhalers) 1 each 0    metolazone (ZAROXOLYN) 2.5 MG tablet Take 2.5 mg by mouth three times a week MWF      Handicap Placard [facility] by Does not apply route Can't walk greater than 200 feet. Expires in 5 years. 1 each 0    fluticasone (CUTIVATE) 0.05 % cream Apply topically 2 times daily       fluticasone (FLONASE) 50 MCG/ACT nasal spray 2 sprays by Nasal route daily (Patient taking differently: 2 sprays by Nasal route daily as needed (sinus symptoms) ) 1 Bottle 3    Melatonin 10 MG TABS Take 10 mg by mouth nightly as needed (insomnia) 90 tablet 1    aspirin 81 MG EC tablet Take 81 mg by mouth daily. No current facility-administered medications on file prior to encounter. REVIEW OF SYSTEMS    Review of Systems   Constitutional: Negative for chills and fever. Cardiovascular: Positive for leg swelling. Both legs chronic   Gastrointestinal: Negative for diarrhea, nausea and vomiting. Skin: Negative for wound. Dry skin         Objective:      BP (!) 106/59   Pulse 89   Temp 97.4 °F (36.3 °C)   Resp 18     Wt Readings from Last 3 Encounters:   05/03/21 (!) 417 lb (189.1 kg)   04/30/21 (!) 404 lb (183.3 kg)   04/23/21 (!) 407 lb (184.6 kg)       Physical Exam:  General:  Alert and oriented x3. In no acute distress.      Lower Extremity Physical Exam:    Vascular: DP pulses are not palpable, Bilateral. PT pulses are not palpable, Bilateral. CFT <3 seconds to all digits, Bilateral.  Nonpitting edema, Bilateral.  Hair growth is absent to the level of the lower leg, Bilateral.  Lipodermatosclerosis is present to both lower extremities. Neuro: Saph/sural/SP/DP/plantar sensation diminished to light touch. Musculoskeletal: EHL/FHL/GS/TA gross motor intact. Gross deformity is absent. Dermatologic: Ulcerations have healed. There is no erythema. There is no purulent drainage. There is no fluctuance or crepitus. Interdigital maceration absent, Bilateral.  Skin of bilateral lower extremity is xerotic, flaking and scaling. Skin temp is warm to cool and equal bilateral.    Wound 04/27/21 Pretibial Right #1 (Active)   Wound Image   05/14/21 0814   Wound Etiology Venous 05/14/21 0814   Dressing Status Old drainage noted;New drainage noted 05/07/21 1032   Wound Cleansed Soap and water 05/07/21 1032   Dressing/Treatment Other (comment) 05/07/21 1111   Wound Length (cm) 0 cm 05/14/21 0814   Wound Width (cm) 0 cm 05/14/21 0814   Wound Depth (cm) 0 cm 05/14/21 0814   Wound Surface Area (cm^2) 0 cm^2 05/14/21 0814   Change in Wound Size % (l*w) 100 05/14/21 0814   Wound Volume (cm^3) 0 cm^3 05/14/21 0814   Wound Healing % 100 05/14/21 0814   Post-Procedure Length (cm) 0 cm 05/14/21 0814   Post-Procedure Width (cm) 0 cm 05/14/21 0814   Post-Procedure Depth (cm) 0 cm 05/14/21 0814   Post-Procedure Surface Area (cm^2) 0 cm^2 05/14/21 0814   Post-Procedure Volume (cm^3) 0 cm^3 05/14/21 0814   Wound Assessment Pink/red 05/07/21 1032   Drainage Amount Moderate 05/07/21 1032   Drainage Description Serosanguinous 05/07/21 1032   Odor None 05/07/21 1032   Terrie-wound Assessment Blanchable erythema 05/07/21 1032   Margins Attached edges 05/07/21 1032   Wound Thickness Description not for Pressure Injury Full thickness 05/07/21 1032   Number of days: 17           PVR/PPG 05/10/21  Summary        Bilateral lower extremity ABIs and PVRs (with toe pressures) were    performed for the evaluation of peripheral vascular disease.  Study    demonstrates:        Right:    Mildly abnormal PVRs    FRANKLYN suggests mild vascular insufficiency at rest      Left:    Mildly abnormal PVRs    FRANKLYN suggests no vascular insufficiency at rest         Assessment:      Active Hospital Problems    Diagnosis Date Noted    PVD (peripheral vascular disease) ([address] [I73.9] 05/07/2021    Non-pressure chronic ulcer of right lower leg, limited to breakdown of skin [facility] [L97.911] 04/27/2021    Lymphedema of both lower extremities [I89.0] 04/27/2021    Xerosis cutis [L85.3] 09/08/2017    Morbid obesity with BMI of 50.0-59.9, adult ([facility] [E66.01, Z68.43] 06/16/2015    CKD (chronic kidney disease) stage 3, GFR 30-59 ml/min [N18.30] 05/08/2013    Type 2 diabetes mellitus with diabetic polyneuropathy, with long-term current use of insulin ([facility] [E11.42, Z79.4]        Plan:     Treatment Note please see attached Discharge Instructions    Educated on the importance of compression therapy for healing and prevention of re-ulceration in the setting of lymphedema. Patient states that he has lymphedema wraps at home and has just not been using them. Continue Farrow wraps    PVR/PPG reviewed, abnormal, and discussed with patient. Referral placed to vascular surgeon to establish care and further evaluation and management regarding PVD. Consider referral to lymphedema clinic if re-ulceration with wraps    Rx ammonium lactate 12% cream to be applied to the dry skin of the feet and legs twice a day to prevent fissuring, skin breakdown, and infection. This will help counteract drying effect of the wraps as well     Educated on signs and symptoms of infection. Instructed to call clinic immediately or go to ER if signs and symptoms of infection are present. RTC 1 week        Written patient discharge instructions given to patient and signed by patient or POA. Orders Placed This Encounter   Medications    lidocaine (XYLOCAINE) 4 % external solution    ammonium lactate (LAC-HYDRIN) 12 % cream     Sig: Apply topically to dry skin BID.      Dispense:  1 Bottle     Refill:  4 Orders Placed This Encounter   Procedures    Initiate Outpatient Wound Care Protocol     Cleanse wound with saline    If wound contains bioburden or contamination cleanse with wound cleanser or antimicrobial solution     For normal periwound tissue without irritation nor maceration, apply topical skin protectant    For periwound tissue with irritation and/or maceration, apply zinc based product, topical steroid cream/ointment, or equivalent     For wounds with dry firm black eschar and/or without exudate, apply betadine and leave open to air      For wounds with scant/small to no exudate or drainage, apply wound gel, hydrocolloid, polymer, or equivalent and cover with secondary dressing/foam      For wounds with moderate/large exudate or drainage, apply alginate, hydrofiber, polymer, or equivalent and cover with secondary dressing/foam    For wounds with nonviable tissue requiring removal, apply chemical or mechanical debrider and cover with secondary dressing/foam    For wounds with tunneling, dead space, or cavity, fill or pack with strip/gauze/kerlex to fit and cover with secondary dressing/foam    For wounds with adequate granulation or epithelization, apply wound gel, hydrocolloid, polymer, collagen, or transparent film, and cover secondary dry dressing/foam    For wounds that need additional secondary dressing to help pad or control additional drainage/exudates, add foam, absorbent pad or hydrocolloid    For wounds with suspected or known infection, apply antimicrobial mesh and/or antimicrobial alginate/hydrofiber, or antimicrobial solution moistened gauze/kerlex, or equivalent and cover with secondary dressing/foam    Compression Management needed for edema control, apply multilayer compression or tubular garment or equivalent    Offloading Management needed for pressure relief, apply offloading shoe/boot or equivalent     Standing Status:   Standing     Number of Occurrences:   [address], Rosaline Pop MD, Vascular Surgery, [facility]     Referral Priority:   Routine     Referral Type:   Eval and Treat     Referral Reason:   Specialty Services Required     Referred to Provider:   Linda Clay MD     Requested Specialty:   Vascular Surgery     Number of Visits Requested:   1          Discharge Instructions         [address] [facility] [address]  Visit Arin Instructions / Physician Orders  DATE: 05/14/21     Home Nell Cohen     Wound Location:  Left lower leg-healed                                  Right lower leg-healed     Cleanse with: Liquid antibacterial soap and water, rinse well      Dressing Orders:  apply Amlactin lotion to legs before you go to bed/ Wear  Farrow wraps during the day  Frequency:     Additional Orders: Increase protein to diet (meat, cheese, eggs, fish, peanut butter, nuts and beans)  Multivitamin daily  ELEVATE LEGS WHEN SITTING  Make appointment with Estephania Manzo   MY CHART []? ?     Smart Device  [x]? ?      HYPERBARIC TREATMENT-                TREATMENT #     Your next appointment with the Wound Care Center-call if needed                                                                                                   ROOM TYPE   []? ? CHAIR     []? ? BED   []? ? EITHER        TIME []?? 45 MIN     []? ? 60 MIN     (Please note your next appointment above and if you are unable to keep, kindly give a 24 hour notice.  Thank you.)     If you experience any of the following, please call the [facility] [address] during business hours:  [phone]     * Increase in Pain  * Temperature over 101  * Increase in drainage from your wound  * Drainage with a foul odor  * Bleeding  * Increase in swelling  * Need for compression bandage changes due to slippage, breakthrough drainage.     If you need medical attention outside of the business hours of the [address] [place] [address] [address] please contact your PCP or go to the nearest emergency room.     The information contained in the After Visit Summary has been reviewed with me, the patient and/or responsible adult, by my health care provider(s). I had the opportunity to ask questions regarding this information. I have elected to receive;      []? ? After Visit Summary  [x]? ? Comprehensive Discharge Instruction        Patient signature______________________________________Date:________  Electronically signed by Jessenia Wang RN on 5/14/2021 at 8:34 AM          Electronically signed by Angela Martin DPM on 5/14/2021 at 8:42 AM

## 2021-05-18 ENCOUNTER — PATIENT MESSAGE (OUTPATIENT)
Dept: FAMILY MEDICINE CLINIC | Age: 56
End: 2021-05-18

## 2021-05-18 DIAGNOSIS — E78.5 HYPERLIPIDEMIA WITH TARGET LDL LESS THAN 70: Primary | ICD-10-CM

## 2021-05-19 ENCOUNTER — PATIENT MESSAGE (OUTPATIENT)
Dept: FAMILY MEDICINE CLINIC | Age: 56
End: 2021-05-19

## 2021-05-19 ENCOUNTER — HOSPITAL ENCOUNTER (OUTPATIENT)
Dept: PHARMACY | Age: 56
Setting detail: THERAPIES SERIES
Discharge: HOME | End: 2021-05-19
Payer: COMMERCIAL

## 2021-05-19 DIAGNOSIS — G89.29 CHRONIC MIDLINE LOW BACK PAIN WITH LEFT-SIDED SCIATICA: ICD-10-CM

## 2021-05-19 DIAGNOSIS — M54.42 CHRONIC MIDLINE LOW BACK PAIN WITH LEFT-SIDED SCIATICA: ICD-10-CM

## 2021-05-19 DIAGNOSIS — M51.36 DDD (DEGENERATIVE DISC DISEASE), LUMBAR: ICD-10-CM

## 2021-05-19 DIAGNOSIS — M48.061 SPINAL STENOSIS OF LUMBAR REGION WITHOUT NEUROGENIC CLAUDICATION: ICD-10-CM

## 2021-05-19 DIAGNOSIS — M96.1 POSTLAMINECTOMY SYNDROME OF LUMBAR REGION: ICD-10-CM

## 2021-05-19 PROCEDURE — 99213 OFFICE O/P EST LOW 20 MIN: CPT

## 2021-05-19 RX ORDER — OXYCODONE HYDROCHLORIDE 10 MG/1
10 TABLET ORAL EVERY 8 HOURS PRN
Qty: 90 TABLET | Refills: 0 | Status: SHIPPED | OUTPATIENT
Start: 2021-05-19 | End: 2021-06-17 | Stop reason: SDUPTHER

## 2021-05-19 RX ORDER — FLASH GLUCOSE SENSOR
KIT MISCELLANEOUS
Qty: 2 EACH | Refills: 0 | Status: SHIPPED | OUTPATIENT
Start: 2021-05-19 | End: 2022-03-14

## 2021-05-19 NOTE — TELEPHONE ENCOUNTER
From: Lainey Nielsen.   To: Hany Ahn MD  Sent: 5/18/2021 6:48 PM EDT  Subject: Non-Urgent Medical Question    I was told by the wound care that I have Blockage in my feet so can someone please let me know what I am going to do about this don't want to lose my legs let me know what to do please thank you

## 2021-05-19 NOTE — PROGRESS NOTES
back of arm. Patient states first sensor he did not feel and may have not been placed correctly but second sensor he felt. Has been double checking his blood sugar with his glucometer with second sensor and appears to be accurate. See blood sugar control below. Called [facility] and they are going to send a voucher for 2 sensors to patient's email. Patient will be able to take this voucher to pharmacy to get free sensors to replace malfunctioning one. Had vascular studies on both legs on 5/13. Patient states Dr. Julia Vides told him his leg pain is not due to neuropathy but rather due to nerve issue in back and vascular issues. Patient states he is not aware of any follow up he has scheduled and placed a message to his PCP this morning requesting follow up. Patient admits to missing meals and at times just having a snack  Than actual meal.  Breakfast often just has a snack such as fruit or peanut butter and crackers. Breakfast is often about 12-1pm.  Lunch is at 2-3pm and  is skipped sometimes. Jennifer Gato is between 6-10pm (8-9pm typically). Admits to grazing more than regular meals although trying. Patient often feels poorly and needs to go into bedroom and isolate in dark/quiet and therefore may miss a meal.  Patient admits he sometimes snacks during the night. Last time he snacked he ate cereal at night. Patient states colonoscopy was fine and told to follow up in another 3 years. Patient reports pain continues to be present and is stable getting worse with eating. No further plans for follow up per patient as he gets tired of complaining. Encouraged him to continue to inform providers of issues. .    Patient states SOB continues. Passed stress test per patient and cleared for colonoscopy. Patient states chest pain continues at times as well but thinks more due to anxiety and not heart issues. States if he goes into bedroom and rests in dark/quiet this relieves the chest pain.     Patient states ear infection has cleared and has hearing aids ordered. New glasses ordered. Glaucoma getting worse per patient. Not starting any new eye drops as of yet but will monitor. Told to keep A1c as low as possible. Still having headaches about 2-3 times with buzzing in head. Isolates in room and sometimes helps. Does have dizziness as well. Patient saw neuro last month and unsure of follow up. Skin infections resolved/stable currently. Compression socks on. Reports swelling at night sometimes but overall edema is less. Using furosemide twice a day and metolazone about three times a week. Patient following with wound care about healing. Patient called Dr. Santana Kelly office and told to continue off of fenofibrate. Next appt with Millie on 21. Has continued not smoking and has not used any Chantix since last appt. No smoking    Patient forgot to bring in inhalers. Using nebulizer less - typically about three times per day. Patient has blood work for Vitamin B12 active in [facility] Unsure when patient will get labs done. Encouraged to get before  on 21. Patient has enough diabetes supplies and insulin. Will call if needs any refills. Discussed times for taking insulin with patient who admits is not consistent with when he takes his insulin which may result in fluctuating blood sugars. Asked patient to document when he takes his insulin to correlate with reports from [facility].      []  Missed doses   []  Emergency Room Visit    [x]  Medication changes  []  Hospitalization   [x]  Diet changes   [x]  Acute illness   []  Activity changes      Symptoms of hypoglycemia    [x]  None    []  Shakiness    []  Lightheadedness or dizziness   []  Confusion      []  Sweating   []  Other        Symptoms of hyperglycemia -.    [x]  None   []  Frequent urination    []  Increased thirst   []  Other    Medication adverse reactions (none due to diabetic medicaitons)   [x]  None   [] Diarrhea / Nausea / Vomiting / Constipation / flatulence   []  Hypertension   []  Peripheral edema     []  Signs of infection   []  Headache   []  Vision changes   []  Increased cholesterol    []  Weight gain   []  Change in renal function   []  Increased potassium  []  Other      Comments:  Patient takes Humulin R U500. Patient has been taking Humulin R U500 0.52ml with breakfast, 0.52ml with lunch and 0.45 with dinner.      If recent hospital admission / ED visit, was this related to Diabetes No:Fall Discharge date 11/9/20    Objective     PMHx:    Past Medical History:   Diagnosis Date    Acute on chronic kidney failure ([address] 7/20/2017    Acute on chronic respiratory failure (HCC) 10/2/2018    Adhesive capsulitis of left shoulder 3/25/2017    Anxiety 10/02/2016    smokes marijuana for this    Arthropathy, unspecified, other specified sites 6/13/2013    Asthma     B12 deficiency     Bilateral lower leg cellulitis 2/17/2016    Blood in stool     CAD (coronary artery disease)     Cellulitis of both lower extremities 5/25/2017    Cellulitis of leg, left 07/20/2017    CHF (congestive heart failure), NYHA class III ([facility]) 8/14/2013    Chronic back pain     Chronic bronchitis ([facility])     Chronic headaches     was referred to neuro, testing scheduled    Chronic respiratory failure ([address]     was on vent    Chronic ulcer of left leg, with fat layer exposed ([address] 02/22/2019    healed    Class 2 severe obesity due to excess calories with serious comorbidity and body mass index (BMI) of 35.0 to 35.9 in adult ([address]     (BMI 35.0-39.9 without comorbidity)    COPD exacerbation ([address] 11/2/2016    Diabetic neuropathy [facility] 8/14/2013    Displacement of lumbar intervertebral disc without myelopathy 6/13/2013    Ear infection     RIGHT    Essential hypertension     Facial cellulitis 2012    Fall 3/25/2017    GERD (gastroesophageal reflux disease)     Head injury     Hearing loss in right ear     pencil pierced ear as a child    Hepatic steatosis 12/3/2015    History of general anesthesia complication     has woke up during surgery under anesthesia    History of rib fracture 12/3/2015    Chronic     Hyperlipidemia     Hypersomnia     can go multiple days without sleeping    Hypertension     Insomnia     Intolerance of continuous positive airway pressure (CPAP) ventilation 7/20/2017    Iron deficiency     Localized rash     gets frequently in axilla, groin, in any fold, on several topical treatments for this    Magnesium deficiency     Mastoiditis of left side     Mixed conductive and sensorineural hearing loss of both ears 1/10/2017    Per ENT    Mixed type COPD (chronic obstructive pulmonary disease) ([address]     On home O2, multiple inhlaers, nebulizer    Moderate recurrent major depression ([address] 10/2/2016    Morbid obesity with BMI of 45.0-49.9, adult ([address] 6/16/2015    On home oxygen therapy     3 Lpm prn    Open wound of groin 12/19/2018    healed     BARBY on CPAP     Osteoarthritis     Otitis externa of left ear     Pancreatitis chronic     Persistent depressive disorder 11/19/2019    Renal insufficiency     proteinuria    Severe depression ([address] 9/25/2013    Spinal stenosis of lumbar region without neurogenic claudication 1/6/2016    MRI lumbar 12/30/15 L3-L4: There is broad-based bulging disc which appears protruding left laterally causing flattening of the ventral thecal sac. In addition, there is facet arthropathy with mild hypertrophic changes.  There is borderline central canal stenosis with  evidence of moderate left neural foraminal narrowing and mild right neural foramina narrowing.   L4-L5: There is broad-based protrud    Syncope 4/28/2017    Tinnitus of both ears 1/10/2017    Per ENT    Type 2 diabetes mellitus with stage 3 chronic kidney disease, with long-term current use of insulin ([address] 12/26/2016    due to underlying condition with hyperosmolarity without coma    Type (OXY-IR) 10 MG immediate release tablet Take 1 tablet by mouth every 8 hours as needed for Pain for up to 30 days. 4/20/21 5/20/21  Travis Mosley MD   PROAIR  (90 Base) MCG/ACT inhaler INHALE TWO PUFFS BY MOUTH EVERY 6 HOURS AS NEEDED FOR WHEEZING OR FOR SHORTNESS OF BREATH 4/13/21   Travis Mosley MD   amitriptyline (ELAVIL) 50 MG tablet Take 2 po qhs 4/9/21   Dariusz Howe MD   pravastatin (PRAVACHOL) 40 MG tablet Take 1 tablet by mouth nightly 3/31/21   Travis Mosley MD   nystatin (MYCOSTATIN) 984396 UNIT/GM powder Apply 2 times daily in the skin folds for several months 3/31/21   Travis Mosley MD   escitalopram (LEXAPRO) 10 MG tablet Take 1 tablet by mouth daily 3/31/21   Travis Mosley MD   tiZANidine (ZANAFLEX) 4 MG tablet TAKE ONE TABLET BY MOUTH EVERY 8 HOURS AS NEEDED FOR BACK PAIN 3/31/21   Travis Mosley MD   bumetanide (BUMEX) 1 MG tablet TAKE THREE TABLETS BY MOUTH TWICE A DAY 3/8/21   Travis Mosley MD   spironolactone (ALDACTONE) 50 MG tablet Take 1 tablet by mouth daily 2/24/21   Travis Mosley MD   vitamin D3 (CHOLECALCIFEROL) 25 MCG (1000 UT) TABS tablet Take 1 tablet by mouth daily 2/24/21   Travis Mosley MD   ammonium lactate (LAC-HYDRIN) 12 % lotion Apply topically daily. For dry skin 2/23/21   Travis Mosley MD   amitriptyline (ELAVIL) 25 MG tablet Take 1 po qhs x 2 days then 2 po qhs 2/5/21   Dariusz Howe MD   clobetasol (TEMOVATE) 0.05 % ointment Apply topically 2 times daily for psoriasis 1/27/21   Travis Mosley MD   ketoconazole (NIZORAL) 2 % cream Apply twice a day for yeast infection in the skin folds, for 4 weeks 1/20/21   Travis Mosley MD   insulin regular human (HUMULIN R) 500 UNIT/ML concentrated injection vial Patient using 0.52ml with breakfast, 0.52ml with lunch and 0.45ml with dinner.  1/20/21   Amrit Crespo MD   albuterol (PROVENTIL) (2.5 MG/3ML) 0.083% nebulizer solution Take 3 mLs by nebulization every 6 hours as needed for Wheezing or Shortness of Breath 1/15/21   Kamilah Kramer MD   pantoprazole (PROTONIX) 40 MG tablet Take 1 tablet by mouth every morning (before breakfast) 1/13/21   Kamilah Kramer MD   Ferrous Sulfate (IRON) 325 (65 Fe) MG TABS Take 1 tablet by mouth daily 11/25/20   Kamilah Kramer MD   metoprolol tartrate (LOPRESSOR) 50 MG tablet Take 1 tablet by mouth 2 times daily 11/4/20   Kamilah Kramer MD   magnesium oxide (MAG-OX) 400 (240 Mg) MG tablet Take 1 tablet by mouth daily 11/4/20   Kamilah Kramer MD   lisinopril (PRINIVIL;ZESTRIL) 5 MG tablet Take 1 tablet by mouth daily . Discontinued Lisinopril  10 mg 11/2/20   Kamilah Kramer MD   fluticasone-salmeterol (ADVAIR HFA) 230-21 MCG/ACT inhaler Inhale 2 puffs into the lungs 2 times daily 10/19/20   Kamilah Kramer MD   tiotropium (SPIRIVA RESPIMAT) 2.5 MCG/ACT AERS inhaler Inhale 2 puffs into the lungs daily 10/19/20   Kamilah Kramer MD   venlafaxine (EFFEXOR XR) 75 MG extended release capsule Take 1 capsule by mouth daily Take with food 10/14/20   Kamilah Kramer MD   naloxone 4 MG/0.1ML LIQD nasal spray 1 spray by Nasal route as needed for Opioid Reversal Due to COPD and sleep apnea, this is a big recommendation for you 9/30/20   Kamilah Kramer MD   blood glucose monitor strips Test 3 times a day & as needed for symptoms of irregular blood glucose. Dispense sufficient amount for indicated testing frequency plus additional to accommodate PRN testing needs. One touch Ultra blue 9/30/20   Anup Burgos MD   Lancets MISC Use to check blood sugar three times daily along with when necessary due to symptoms.  9/30/20   Anup Burgos MD   Insulin Syringe-Needle U-100 31G X 5/16\" 1 ML MISC Use to subcutaneously inject insulin three times daily 9/30/20   Anup Burgos MD   clopidogrel (PLAVIX) 75 MG tablet TAKE ONE TABLET BY MOUTH DAILY 7/29/20   Kamilah Kramer MD   vitamin B-12 (CYANOCOBALAMIN) 500 MCG tablet Take 1 tablet by mouth daily 7/13/20   Thomas Garcia MD   Oxygen Tubing MISC by Does not apply route DX COPD. chronic respiratory failure 3/26/20   Thomas Garcia MD   Respiratory Therapy Supplies (NEBULIZER/TUBING/MOUTHPIECE) KIT Dx COPD needs nebulizer supplies 2/20/20   Thomas Garcia MD   nitroGLYCERIN (NITROSTAT) 0.4 MG SL tablet Place 1 tablet under the tongue every 5 minutes as needed for Chest pain (and call 911) 2/13/20   Thomas Garcia MD   ONE TOUCH ULTRASOFT LANCETS [address] Patient to test blood sugar up to 4 times daily. 11/7/19   Woody Su MD   Lancet Devices (LANCING DEVICE) MISC Provide patient with lancing device appropriate for his machine/lancing needles. 6/4/19   Thomas Garcia MD   Lidocaine 4 % LOTN Apply topically    Historical Provider, MD   Spacer/Aero Chamber Mouthpiece MISC 1 each by Does not apply route once as needed (to be used with his inhalers) 4/15/19 4/9/21  Thomas Garcia MD   metolazone (ZAROXOLYN) 2.5 MG tablet Take 2.5 mg by mouth three times a week MWF    Historical Provider, MD   Handicap Placard MISC by Does not apply route Can't walk greater than 200 feet. Expires in 5 years. 2/13/19   Paige Bryant MD   fluticasone (CUTIVATE) 0.05 % cream Apply topically 2 times daily  4/19/17   Historical Provider, MD   fluticasone (FLONASE) 50 MCG/ACT nasal spray 2 sprays by Nasal route daily  Patient taking differently: 2 sprays by Nasal route daily as needed (sinus symptoms)  1/16/17   Thomas Garcia MD   Melatonin 10 MG TABS Take 10 mg by mouth nightly as needed (insomnia) 12/23/16   Thomas Garcia MD   aspirin 81 MG EC tablet Take 81 mg by mouth daily.     Historical Provider, MD       Immunizations:   Most Recent Immunizations   Administered Date(s) Administered    DT (pediatric) 12/14/1998    Hepatitis B Adult (Engerix-B) 12/04/2019    Hepatitis B Adult (Recombivax HB) 12/04/2019    Influenza Virus Vaccine 10/12/2015    Influenza, Quadv, IM, PF (6 mo and older Fluzone, Flulaval, Fluarix, and 3 yrs and older Afluria) 10/09/2020    Pneumococcal Polysaccharide ([id number]) 05/23/2013    Tdap (Boostrix, Adacel) 09/12/2018       Home Blood Glucose Results:   Per patient's [employer] Lianne report. Patient starting using on 5/11 and states for first two days his readings on his glucometer were not close to what [facility] were reading. After placed new sensor his glucometer and [facility] reports have been similar. Values listed above are somewhat off due to patient only wearing Lianne 5/11 thru today and 5/11-5/13 values possibly being off due to malfunctioning sensor. Overall blood sugars look better controlled but still have some elevated and low blood sugars. Assessment     A1c at goal:No:  8.0 (4/23/21) (decreasing)  Blood Pressure at goal: 106/59 on 5/14/21  Weight at goal: No:    Physical activity: No  Physical activity at goal: No  Patient encouraged but unable to due SOB. Smoking Cessation: Yes    Cholesterol at target: Yes  Annual eye exam completed: Yes  Comprehensive Foot Exam Completed:  Yes  Annual urine albumin and serum creatinine: Yes    Statin: Yes    Appropriate?: Yes  Changes made: refill provided    ACE/ARB: Yes  Appropriate?: Yes  Changes made:     Aspirin: Yes  Appropriate?: Yes  Changes made:     Eating patterns:    [x]  My plate    []  Mediterranean diet   []  Low sodium   []  DASH diet   [x]  Portion control   [x]  Reduced calorie    []  Fast food / Restaurant  []  High glycemic index foods   []  Sugary beverages   []  Other     Comment: see above    Current Medications Affecting Diabetes:  Humulin R 500    Compliant: Yes  Barriers to medication therapy: anxiety / depression    Medications attempted in the past:  Metformin - cardiologist took him off but patient unsure why  Januvia - PCP took him off but patient unsure why    Recent exacerbations / new problems:  Infection / fall - ankle pain    Last office dictation reviewed: yes        type 2 DM under improving control as evidenced by A1C 8.0 on 4/23/21    Plan       Counseling at today's visit:     -Continued monitoring of blood glucose with use of Freestyle Lianne. Call with any issues with sensor. Bring data cord to each visit.      -Continued dose of insulin:0.52ml with breakfast, 0.52ml with lunch 0.45ml with dinner.       -Patient has enough diabetic supplies and medications. Patient given log to document time and what he eats as far as meals. Will compare blood sugars around meals to determine need for adjustment. Patient to also write down times he takes insulin to correlate with reports from [address] [place] [address] [place]. Physician Follow-up:   Dr Anabel Buckley - 7/14/2`    Medication Management Follow-up:   Diabetes Service   4 weeks in person. Electronically signed by Filbert Mortimer, RPH on 5/19/2021 at 8:26 AM     CLINICAL PHARMACY CONSULT: MED RECONCILIATION/REVIEW [facility]. Tracking Only    PHSO: No  Total # of Interventions Recommended: 3  - Updated Order #: 3 Updated Order Reason(s):  Other  Total Interventions Accepted: 3  Time Spent (min): 45    Yesenia Rizvi [facility], PharmD  [address]

## 2021-05-19 NOTE — TELEPHONE ENCOUNTER
From: Osvaldo Goodman.   To: Lizy Gonzalez MD  Sent: 5/19/2021 10:22 AM EDT  Subject: Non-Urgent Medical Question    Can you please refill my pain meds one tablet every 8 hours oxycodone 10 mg thank you so much

## 2021-05-21 RX ORDER — ROSUVASTATIN CALCIUM 10 MG/1
10 TABLET, COATED ORAL NIGHTLY
Qty: 90 TABLET | Refills: 3 | Status: SHIPPED | OUTPATIENT
Start: 2021-05-21 | End: 2022-05-09 | Stop reason: SDUPTHER

## 2021-05-24 ENCOUNTER — INITIAL CONSULT (OUTPATIENT)
Dept: VASCULAR SURGERY | Age: 56
End: 2021-05-24
Payer: COMMERCIAL

## 2021-05-24 VITALS
HEART RATE: 79 BPM | TEMPERATURE: 97.2 F | BODY MASS INDEX: 42.66 KG/M2 | RESPIRATION RATE: 20 BRPM | SYSTOLIC BLOOD PRESSURE: 138 MMHG | HEIGHT: 72 IN | DIASTOLIC BLOOD PRESSURE: 86 MMHG | OXYGEN SATURATION: 93 % | WEIGHT: 315 LBS

## 2021-05-24 DIAGNOSIS — I89.0 LYMPHEDEMA OF BOTH LOWER EXTREMITIES: ICD-10-CM

## 2021-05-24 DIAGNOSIS — L97.909 CHRONIC CUTANEOUS VENOUS STASIS ULCER (HCC): Primary | ICD-10-CM

## 2021-05-24 DIAGNOSIS — I83.009 CHRONIC CUTANEOUS VENOUS STASIS ULCER (HCC): Primary | ICD-10-CM

## 2021-05-24 DIAGNOSIS — I73.9 PAD (PERIPHERAL ARTERY DISEASE) (HCC): ICD-10-CM

## 2021-05-24 PROCEDURE — 99204 OFFICE O/P NEW MOD 45 MIN: CPT | Performed by: SURGERY

## 2021-05-24 PROCEDURE — 3017F COLORECTAL CA SCREEN DOC REV: CPT | Performed by: SURGERY

## 2021-05-24 PROCEDURE — 1036F TOBACCO NON-USER: CPT | Performed by: SURGERY

## 2021-05-24 PROCEDURE — G8417 CALC BMI ABV UP PARAM F/U: HCPCS | Performed by: SURGERY

## 2021-05-24 PROCEDURE — G8427 DOCREV CUR MEDS BY ELIG CLIN: HCPCS | Performed by: SURGERY

## 2021-05-24 ASSESSMENT — ENCOUNTER SYMPTOMS
COLOR CHANGE: 1
CHEST TIGHTNESS: 0
ABDOMINAL PAIN: 0
SHORTNESS OF BREATH: 1
ALLERGIC/IMMUNOLOGIC NEGATIVE: 1
BACK PAIN: 1

## 2021-05-24 NOTE — PROGRESS NOTES
Division of Vascular Surgery        New Consult      Physician Requesting Consult:  Dr. Ghada Valadez    Reason for Consult:   PAD, chronic venous insufficiency/lymphedema    Chief Complaint:      PAD, chronic venous insufficiency/lymphedema    History of Present Illness:      Chastity Figueroa is a 63 y.o. gentleman presents for evaluation of chronic venous insufficiency/lymphedema with venous stasis and traumatic ulcerations of bilateral lower extremities. He denies lower extremity DVT. He does not walk very much secondary to chronic back pain and significant shortness of breath. He quit smoking about 6 months ago. He underwent non-invasive testing with PVRs and there was concern for mild peripheral arterial disease. He denies specific symptoms to suggest claudication or ischemic rest pain. His legs do cause him discomfort, especially when they are swollen. He is compliant with compression therapy with velcro wraps and has been evaluated by lymphedema clinic. He is not consistent with using his lymphedema pumps on a daily basis. He has several healed venous stasis uylcerations that have healed and some traumatic ones secondary to just bumping his legs on something.     Medical History:     Past Medical History:   Diagnosis Date    Acute on chronic kidney failure ([facility] 7/20/2017    Acute on chronic respiratory failure (HCC) 10/2/2018    Adhesive capsulitis of left shoulder 3/25/2017    Anxiety 10/02/2016    smokes marijuana for this    Arthropathy, unspecified, other specified sites 6/13/2013    Asthma     B12 deficiency     Bilateral lower leg cellulitis 2/17/2016    Blood in stool     CAD (coronary artery disease)     Cellulitis of both lower extremities 5/25/2017    Cellulitis of leg, left 07/20/2017    CHF (congestive heart failure), NYHA class III (HCC) 8/14/2013    Chronic back pain     [facility] ([facility]     Chronic headaches     was referred to neuro, testing scheduled  Chronic respiratory failure (HCC)     was on vent    Chronic ulcer of left leg, with fat layer exposed ([address] 02/22/2019    healed    Class 2 severe obesity due to excess calories with serious comorbidity and body mass index (BMI) of 35.0 to 35.9 in adult (HCC)     (BMI 35.0-39.9 without comorbidity)    COPD exacerbation ([address] 11/2/2016    Diabetic neuropathy ([address] 8/14/2013    Displacement of lumbar intervertebral disc without myelopathy 6/13/2013    Ear infection     RIGHT    Essential hypertension     Facial cellulitis 2012    Fall 3/25/2017    GERD (gastroesophageal reflux disease)     Head injury     Hearing loss in right ear     pencil pierced ear as a child    Hepatic steatosis 12/3/2015    History of general anesthesia complication     has woke up during surgery under anesthesia    History of rib fracture 12/3/2015    Chronic     Hyperlipidemia     Hypersomnia     can go multiple days without sleeping    Hypertension     Insomnia     Intolerance of continuous positive airway pressure (CPAP) ventilation 7/20/2017    Iron deficiency     Localized rash     gets frequently in axilla, groin, in any fold, on several topical treatments for this    Magnesium deficiency     Mastoiditis of left side     Mixed conductive and sensorineural hearing loss of both ears 1/10/2017    Per ENT    Mixed type COPD (chronic obstructive pulmonary disease) ([address]     On home O2, multiple inhlaers, nebulizer    Moderate recurrent major depression ([address] 10/2/2016    Morbid obesity with BMI of 45.0-49.9, adult ([address] 6/16/2015    On home oxygen therapy     3 Lpm prn    Open wound of groin 12/19/2018    healed     BARBY on CPAP     Osteoarthritis     Otitis externa of left ear     Pancreatitis chronic     Persistent depressive disorder 11/19/2019    Renal insufficiency     proteinuria    Severe depression ([address] 9/25/2013    Spinal stenosis of lumbar region without neurogenic claudication 1/6/2016 MRI lumbar 12/30/15 L3-L4: There is broad-based bulging disc which appears protruding left laterally causing flattening of the ventral thecal sac. In addition, there is facet arthropathy with mild hypertrophic changes.  There is borderline central canal stenosis with  evidence of moderate left neural foraminal narrowing and mild right neural foramina narrowing.   L4-L5: There is broad-based protrud    Syncope 4/28/2017    Tinnitus of both ears 1/10/2017    Per ENT    Type 2 diabetes mellitus with stage 3 chronic kidney disease, with long-term current use of insulin ([facility] 12/26/2016    due to underlying condition with hyperosmolarity without coma    Type II or unspecified type diabetes mellitus without mention of complication, not stated as uncontrolled     uncontrolled    Vitamin D deficiency     Wears glasses     for reading     Surgical History:     Past Surgical History:   Procedure Laterality Date    BACK SURGERY   (x 4) 2000,.12/2011.2/2012     Dr Rehan Mejias last [address]  04/23/2018    no stenting    COLONOSCOPY  11/3/2015    hemorrhoids, poor prep, not done    COLONOSCOPY  2013    COLONOSCOPY N/A 4/12/2021    COLONOSCOPY POLYPECTOMY SNARE/COLD BIOPSY/HOT BIOPSY/CLIP APPLICATION X1 performed by Lesly Lucero MD at [facility]  March 2013    x 1    HAND TENDON SURGERY Left     thumb tendon repair    INTRACAPSULAR CATARACT EXTRACTION Right 11/5/2019    EYE CATARACT EMULSIFICATION IOL IMPLANT performed by Evangelina Mccain MD at [facility] Left 1/7/2020    EYE CATARACT EMULSIFICATION IOL IMPLANT performed by Evangelina Mccain MD at [address] ARTHROSCOPY Left     NERVE BLOCK  07-31-15    TENS unit    MN ESOPHAGOGASTRODUODENOSCOPY TRANSORAL DIAGNOSTIC N/A 7/18/2018    EGD ESOPHAGOGASTRODUODENOSCOPY performed by Lesly Lucero MD at [facility] 2012    Dr Torin Lemus     Family History:     Family History   Problem Relation Age of Onset    Heart Disease Mother          age 64 from [facility] High Blood Pressure Mother     Diabetes Mother     High Blood Pressure Father          age 80 from CKD and Lung Fibrosis    Kidney Disease Father     Heart Disease Sister     Heart Attack Sister     Obesity Sister     Diabetes Sister      Allergies:       Levofloxacin, Lorazepam, Nsaids, Prozac [fluoxetine hcl], and Vancomycin    Medications:      Current Outpatient Medications   Medication Sig Dispense Refill    rosuvastatin (CRESTOR) 10 MG tablet Take 1 tablet by mouth nightly Stop pravastatin 90 tablet 3    Continuous Blood Gluc Sensor (FREESTYLE CRISTINE 2 SENSOR) [facility] [place] [place] Patient to apply a new sensor every 14 days. RX to cover voucher patient will bring in. 2 each 0    oxyCODONE HCl (OXY-IR) 10 MG immediate release tablet Take 1 tablet by mouth every 8 hours as needed for Pain for up to 30 days. 90 tablet 0    ammonium lactate (LAC-HYDRIN) 12 % cream Apply topically to dry skin BID. 1 Bottle 4    sulfacetamide (BLEPH-10) 10 % ophthalmic solution       mupirocin (BACTROBAN) 2 % ointment Apply topically 2 times daily on the affected area for 7-10 days. OK to substitute to cream 30 g 2    Gauze Pads & Dressings 4\"X4\" PADS Twice a day dressing of right leg wound 60 each 5    Gauze Bandages (ROLLED GAUZE BANDAGE 4\"X2. 5YD) MISC For twice a day dressing 60 each 5    PROAIR  (90 Base) MCG/ACT inhaler INHALE TWO PUFFS BY MOUTH EVERY 6 HOURS AS NEEDED FOR WHEEZING OR FOR SHORTNESS OF BREATH 8.5 g 3    amitriptyline (ELAVIL) 50 MG tablet Take 2 po qhs 60 tablet 5    nystatin (MYCOSTATIN) 727337 UNIT/GM powder Apply 2 times daily in the skin folds for several months 1 Bottle 3    escitalopram (LEXAPRO) 10 MG tablet Take 1 tablet by mouth daily 90 tablet 3    tiZANidine (ZANAFLEX) 4 MG tablet TAKE ONE TABLET BY MOUTH EVERY 8 HOURS AS NEEDED Melatonin 10 MG TABS Take 10 mg by mouth nightly as needed (insomnia) 90 tablet 1    aspirin 81 MG EC tablet Take 81 mg by mouth daily. No current facility-administered medications for this visit. Social History:     Tobacco:    reports that he quit smoking about 6 months ago. His smoking use included cigarettes. He started smoking about 35 years ago. He has a 8.25 pack-year smoking history. He quit smokeless tobacco use about 25 years ago. His smokeless tobacco use included snuff. Alcohol:      reports no history of alcohol use. Drug Use:  reports current drug use. Drug: Marijuana. Review of Systems:     Review of Systems   Constitutional: Positive for activity change (decreased) and fatigue. Negative for chills and fever. HENT: Negative. Negative for congestion. Eyes: Negative for visual disturbance. Respiratory: Positive for shortness of breath. Negative for chest tightness. Cardiovascular: Positive for leg swelling. Gastrointestinal: Negative for abdominal pain. Endocrine: Negative. Genitourinary: Negative. Musculoskeletal: Positive for arthralgias, back pain and gait problem. Skin: Positive for color change and wound. Allergic/Immunologic: Negative. Neurological: Negative for facial asymmetry, speech difficulty, weakness and numbness. Hematological: Negative. Psychiatric/Behavioral: Negative. Physical Exam:     Vitals:  Ht 6' (1.829 m) Comment: per pt  Wt (!) 420 lb (190.5 kg)   BMI 56.96 kg/m²     Physical Exam  Constitutional:       Appearance: He is well-developed and well-groomed. He is morbidly obese. Eyes:      Extraocular Movements: Extraocular movements intact. Conjunctiva/sclera: Conjunctivae normal.   Neck:      Vascular: No carotid bruit. Cardiovascular:      Rate and Rhythm: Normal rate and regular rhythm. Pulses:           Dorsalis pedis pulses are detected w/ Doppler on the right side and 1+ on the left side.         Posterior needed  ·   Electronically signed by Ramses Rachel MD on 5/24/21 at 10:18 AM EDT      [address] [place] [address],[facility]  Office: [phone]  Cell: [phone]  Email: [email or web address]

## 2021-06-02 ENCOUNTER — TELEPHONE (OUTPATIENT)
Dept: PHARMACY | Age: 56
End: 2021-06-02

## 2021-06-02 RX ORDER — FLASH GLUCOSE SENSOR
KIT MISCELLANEOUS
Qty: 2 EACH | Refills: 3 | Status: SHIPPED | OUTPATIENT
Start: 2021-06-02 | End: 2021-09-15

## 2021-06-02 NOTE — TELEPHONE ENCOUNTER
Refill of Freestyle Lianne 14 sensor sent to [facility]. Patient having trouble keeping sensors on body. Has used all sorts of bandaids (regular and water proof) and tegaderm. Using an ace bandage now to keep sensor on. Yesenia Rizvi [facility],Pharm. D,, [facility], [facility]  6/2/2021  9:57 AM

## 2021-06-13 ENCOUNTER — PATIENT MESSAGE (OUTPATIENT)
Dept: FAMILY MEDICINE CLINIC | Age: 56
End: 2021-06-13

## 2021-06-13 DIAGNOSIS — F33.1 MODERATE EPISODE OF RECURRENT MAJOR DEPRESSIVE DISORDER (HCC): ICD-10-CM

## 2021-06-14 RX ORDER — ESCITALOPRAM OXALATE 10 MG/1
10 TABLET ORAL DAILY
Qty: 90 TABLET | Refills: 3 | Status: SHIPPED
Start: 2021-06-14 | End: 2021-10-23 | Stop reason: ALTCHOICE

## 2021-06-14 NOTE — TELEPHONE ENCOUNTER
From: Leandro Cerda. To: Cecelia Segura MD  Sent: 6/13/2021 10:58 AM EDT  Subject: Non-Urgent Medical Question    Can you please refill my ESCITALOPRAM. 10 MG.  TABLET one [employer] Thank you

## 2021-06-16 ENCOUNTER — HOSPITAL ENCOUNTER (OUTPATIENT)
Dept: PHARMACY | Age: 56
Setting detail: THERAPIES SERIES
Discharge: HOME | End: 2021-06-16
Payer: COMMERCIAL

## 2021-06-16 PROCEDURE — 99212 OFFICE O/P EST SF 10 MIN: CPT

## 2021-06-16 NOTE — PROGRESS NOTES
Diabetic Medication Management   [address] [place]. [place], [address] [facility]  Phone: [phone]  Fax: [phone]    NAME: Scooter Long. MEDICAL RECORD NUMBER:  [id number]  AGE: 63 y.o. GENDER: male  : 1965  EPISODE DATE:  2021       Mr. Dianna Tripathi was referred to Professor Alla [facility] by Dr. Dewey Mckinney, Special instructions include: titrate all medications (as defined in clinic's policy and procedure)    Patient seen in office. Goals per referral:   Fasting blood glucose: < 130  Peak postprandial glucose: < 180  A1C: < 7    Other goals:  Blood pressure goal: 130/80  Weight loss goal (~10%): Target weight  410 to be reached by date: Aug 2021 (3-6 months)  Physical Activity goal:    At this time regular exercise is not realistic for patient so no goal set. Smoking Cessation   Quit Date: Stopped 20. Cholesterol at target:   Date: Yes LDL 82  HDL 33 Trig 180 (20)  Annual eye exam:    Date: 2021  Comprehensive annual foot exam:   Date:    Annual urine Albumin and serum creatinine:   Date:  microalbumin <12 mg/L (21), SrCr 1.94 mg/dL (21) eGFR 38 ml/min        Subjective   Mr. Dianna Tripathi is a 63 y.o. male here for the Diabetes Service for self-management education, medication review including over the counter medications and herbal products, overall well-being assessment, transition of care and any needed adjustments with updates and recommendations communicated to the referring physician. Patient's name and  verified. Patient Findings:    Patient's father in law passed in last two weeks and patient reports being very depressed. Father in law was best friend per patient. Encouraged patient to consider counseling again but patient indicates he does not want to consider. Got hearing aids. Completed a two week trial then back for adjustments and working well.   Patient states he is hearing sounds he forgot existed like birds. States this does give him some hope things can get better. Still having issues keeping sensors to stick. Staying on for 14 days but having to use ace bandage to keep on and patient does not like. Reviewed options such as SkinTac and patches such as SimPatch or Patchabetes and gave information to patient who intends to try. See blood sugar control below. Blood sugar control over last week has worsened as patient admits to not taking insulin some days and not eating  Patient admits to not taking insulin at all yesterday. Discussed importance of insulin and how increase in blood sugar can make all issues worse. Patient does understand and will try to do his best to take insulin as instructed. Has had a couple hypoglycemic episodes and reports eating candy or OJ and rechecking blood sugar as educated. Patient said he has gone to vascular physician who told him the pain in legs coming from back issues. Suggested pain clinic but patient feels he has gone down that road and didn't help in past.      Patient states SOB has actually gotten better. Has not used MDI rescue inhaler in quite awhile. Patient using regular inhalers and nebulizer as directed. Patient has not smoked even with recent stress. Went to store to buy a pack of cigarettes but when got there only bought a pack of gum. Still chest pain but feels anxiety. Feels fluid / edema is more but not weighing himself lately. Taking his diuretics as directed and in the bathroom a lot. Fluid in legs and hands. Keeping wraps on legs which helps. Over night even when legs elevated legs swell without wraps. That is when chest pain happens. Taking metolazone MWF but does not notice a lot of difference in output. Patient continues to miss meals as often times stays in his room all day. Does have peanut butter and pretzels or crackers in room and fruit. Patient got new glasses but no changes.   . Glaucoma getting worse per patient. Not starting any new eye drops as of yet but will monitor. Patient goes back to eye doctor on 6/23/21. Told to keep A1c as low as possible. Still having headaches every day with buzzing in head. Isolates in room and sometimes helps. Does have dizziness as well. Patient has upcoming appt with neurologist.    Skin infections getting worse again. Patient thinks he is needs antibiotics again. Notified PCP via [facility] yesterday 6/15 but was after hours and waiting to hear about any orders today. Patient done with wound care and is to let them know of any further issues. Next appt with Millie on 7/14/21. Plans on bloodwork on 6/23/21. Patient aware of orders in Epic. Patient has enough diabetes supplies and insulin. Will call if needs any refills. Discussed times for taking insulin with patient who admits is not consistent with when he takes his insulin which may result in fluctuating blood sugars. Asked patient to document when he takes his insulin to correlate with reports from [facility]. [x]  Missed doses   []  Emergency Room Visit    [x]  Medication changes  []  Hospitalization   [x]  Diet changes   [x]  Acute illness   []  Activity changes      Symptoms of hypoglycemia    []  None    [x]  Shakiness    [x]  Lightheadedness or dizziness   []  Confusion      []  Sweating   []  Other        Symptoms of hyperglycemia -.    []  None   [x]  Frequent urination    []  Increased thirst   []  Other    Medication adverse reactions (none due to diabetic medicaitons)   [x]  None   []  Diarrhea / Nausea / Vomiting / Constipation / flatulence   []  Hypertension   []  Peripheral edema     []  Signs of infection   []  Headache   []  Vision changes   []  Increased cholesterol    []  Weight gain   []  Change in renal function   []  Increased potassium  []  Other      Comments:  Patient takes Humulin R U500.   Patient has been taking Humulin R U500 0.52ml with breakfast, 0.52ml with lunch deficiency     Localized rash     gets frequently in axilla, groin, in any fold, on several topical treatments for this    Magnesium deficiency     Mastoiditis of left side     Mixed conductive and sensorineural hearing loss of both ears 1/10/2017    Per ENT    Mixed type COPD (chronic obstructive pulmonary disease) ([address]     On home O2, multiple inhlaers, nebulizer    Moderate recurrent major depression ([address] 10/2/2016    Morbid obesity with BMI of 45.0-49.9, adult ([address] 6/16/2015    On home oxygen therapy     3 Lpm prn    Open wound of groin 12/19/2018    healed     BARBY on CPAP     Osteoarthritis     Otitis externa of left ear     Pancreatitis chronic     Persistent depressive disorder 11/19/2019    Renal insufficiency     proteinuria    Severe depression ([address] 9/25/2013    Spinal stenosis of lumbar region without neurogenic claudication 1/6/2016    MRI lumbar 12/30/15 L3-L4: There is broad-based bulging disc which appears protruding left laterally causing flattening of the ventral thecal sac. In addition, there is facet arthropathy with mild hypertrophic changes.  There is borderline central canal stenosis with  evidence of moderate left neural foraminal narrowing and mild right neural foramina narrowing.   L4-L5: There is broad-based protrud    Syncope 4/28/2017    Tinnitus of both ears 1/10/2017    Per ENT    Type 2 diabetes mellitus with stage 3 chronic kidney disease, with long-term current use of insulin ([address] 12/26/2016    due to underlying condition with hyperosmolarity without coma    Type II or unspecified type diabetes mellitus without mention of complication, not stated as uncontrolled     uncontrolled    Vitamin D deficiency     Wears glasses     for reading       Social History:    Social History     Tobacco Use    Smoking status: Former Smoker     Packs/day: 0.25     Years: 33.00     Pack years: 8.25     Types: Cigarettes     Start date: 6/22/1985     Quit date: 11/1/2020 Years since quittin.6    Smokeless tobacco: Former User     Types: Snuff     Quit date: 1995   Substance Use Topics    Alcohol use: No     Alcohol/week: 0.0 standard drinks       Pertinent Labs:    Lab Results   Component Value Date    LABA1C 8.0 2021    LABA1C 8.7 (H) 2021    LABA1C 7.3 (H) 2020     Lab Results   Component Value Date    CHOL 151 2020    TRIG 180 (H) 2020    HDL 33 (L) 2020     Lab Results   Component Value Date    CREATININE 1.94 (H) 2021    BUN 44 (H) 2021     2021    K 4.6 2021     2021    CO2 25 2021     Lab Results   Component Value Date    ALT 18 2021         Wt Readings from Last 3 Encounters:   21 (!) 420 lb (190.5 kg)   21 (!) 417 lb (189.1 kg)   21 (!) 404 lb (183.3 kg)       Current medications:  Prior to Admission medications    Medication Sig Start Date End Date Taking? Authorizing Provider   escitalopram (LEXAPRO) 10 MG tablet Take 1 tablet by mouth daily 21   Meghna Malagon MD   Continuous Blood Gluc Sensor (FREESTYLE CRISTINE 14 DAY SENSOR) MISC Use one sensor every 14 days 21   Meghna Malagon MD   rosuvastatin (CRESTOR) 10 MG tablet Take 1 tablet by mouth nightly Stop pravastatin 21   Meghna Malagon MD   Continuous Blood Gluc Sensor (FREESTYLE CRISTINE 2 SENSOR) [facility] to apply a new sensor every 14 days. RX to cover voucher patient will bring in. 21   Meghna Malagon MD   oxyCODONE HCl (OXY-IR) 10 MG immediate release tablet Take 1 tablet by mouth every 8 hours as needed for Pain for up to 30 days.  21  Meghna Malagon MD   ammonium lactate (LAC-HYDRIN) 12 % cream Apply topically to dry skin BID. 21   Javad Qiu DPM   sulfacetamide (BLEPH-10) 10 % ophthalmic solution  3/24/21   Historical Provider, MD   mupirocin (BACTROBAN) 2 % ointment Apply topically 2 times daily on the affected area for 7-10 days. OK to substitute to cream 4/23/21   Mar Rocha MD   Gauze Pads & Dressings 4\"X4\" PADS Twice a day dressing of right leg wound 4/23/21   Mar Rocha MD   Gauze Bandages (ROLLED GAUZE BANDAGE 4\"X2. 5YD) MISC For twice a day dressing 4/23/21   Mar Rocha MD   PROAIR  (90 Base) MCG/ACT inhaler INHALE TWO PUFFS BY MOUTH EVERY 6 HOURS AS NEEDED FOR WHEEZING OR FOR SHORTNESS OF BREATH 4/13/21   Mar Rocha MD   amitriptyline (ELAVIL) 50 MG tablet Take 2 po qhs 4/9/21   Raheem Andrews MD   nystatin (MYCOSTATIN) 832377 UNIT/GM powder Apply 2 times daily in the skin folds for several months 3/31/21   Mar Rohca MD   tiZANidine (ZANAFLEX) 4 MG tablet TAKE ONE TABLET BY MOUTH EVERY 8 HOURS AS NEEDED FOR BACK PAIN 3/31/21   Mar Rocha MD   bumetanide (BUMEX) 1 MG tablet TAKE THREE TABLETS BY MOUTH TWICE A DAY 3/8/21   Mar Rocha MD   spironolactone (ALDACTONE) 50 MG tablet Take 1 tablet by mouth daily 2/24/21   Mar Rocha MD   vitamin D3 (CHOLECALCIFEROL) 25 MCG (1000 UT) TABS tablet Take 1 tablet by mouth daily 2/24/21   Mar Rocha MD   ammonium lactate (LAC-HYDRIN) 12 % lotion Apply topically daily. For dry skin 2/23/21   Mar Rocha MD   amitriptyline (ELAVIL) 25 MG tablet Take 1 po qhs x 2 days then 2 po qhs 2/5/21   Raheem Andrews MD   clobetasol (TEMOVATE) 0.05 % ointment Apply topically 2 times daily for psoriasis 1/27/21   Mar Rocha MD   ketoconazole (NIZORAL) 2 % cream Apply twice a day for yeast infection in the skin folds, for 4 weeks 1/20/21   Mar Rocha MD   insulin regular human (HUMULIN R) 500 UNIT/ML concentrated injection vial Patient using 0.52ml with breakfast, 0.52ml with lunch and 0.45ml with dinner.  1/20/21   Danisha Le MD   albuterol (PROVENTIL) (2.5 MG/3ML) 0.083% nebulizer solution Take 3 mLs by nebulization every 6 hours as needed for Wheezing or Shortness of Breath 1/15/21   aWlker Minor MD   pantoprazole (PROTONIX) 40 MG tablet Take 1 tablet by mouth every morning (before breakfast) 1/13/21   Walker Minor MD   Ferrous Sulfate (IRON) 325 (65 Fe) MG TABS Take 1 tablet by mouth daily 11/25/20   Walker Minor MD   metoprolol tartrate (LOPRESSOR) 50 MG tablet Take 1 tablet by mouth 2 times daily 11/4/20   Walker Minor MD   magnesium oxide (MAG-OX) 400 (240 Mg) MG tablet Take 1 tablet by mouth daily 11/4/20   Walker Minor MD   lisinopril (PRINIVIL;ZESTRIL) 5 MG tablet Take 1 tablet by mouth daily . Discontinued Lisinopril  10 mg 11/2/20   Walker Minor MD   fluticasone-salmeterol (ADVAIR HFA) 230-21 MCG/ACT inhaler Inhale 2 puffs into the lungs 2 times daily 10/19/20   Walker Minor MD   tiotropium (SPIRIVA RESPIMAT) 2.5 MCG/ACT AERS inhaler Inhale 2 puffs into the lungs daily 10/19/20   Walker Minor MD   venlafaxine (EFFEXOR XR) 75 MG extended release capsule Take 1 capsule by mouth daily Take with food 10/14/20   Walker Minor MD   naloxone 4 MG/0.1ML LIQD nasal spray 1 spray by Nasal route as needed for Opioid Reversal Due to COPD and sleep apnea, this is a big recommendation for you 9/30/20   Walker Minor MD   blood glucose monitor strips Test 3 times a day & as needed for symptoms of irregular blood glucose. Dispense sufficient amount for indicated testing frequency plus additional to accommodate PRN testing needs. One touch Ultra blue 9/30/20   Carmen Malhotra MD   Lancets MISC Use to check blood sugar three times daily along with when necessary due to symptoms.  9/30/20   Carmen Malhotra MD   Insulin Syringe-Needle U-100 31G X 5/16\" 1 ML MISC Use to subcutaneously inject insulin three times daily 9/30/20   Carmen Malhotra MD   clopidogrel (PLAVIX) 75 MG tablet TAKE ONE TABLET BY MOUTH DAILY 7/29/20   Walker Minor MD   vitamin B-12 (CYANOCOBALAMIN) 500 MCG tablet Take 1 tablet by mouth daily 7/13/20   Travis Mosley MD   Oxygen Tubing MISC by Does not apply route DX COPD. chronic respiratory failure 3/26/20   Travis Mosley MD   Respiratory Therapy Supplies (NEBULIZER/TUBING/MOUTHPIECE) KIT Dx COPD needs nebulizer supplies 2/20/20   Travis Mosley MD   nitroGLYCERIN (NITROSTAT) 0.4 MG SL tablet Place 1 tablet under the tongue every 5 minutes as needed for Chest pain (and call 911) 2/13/20   Travis Mosley MD   ONE TOUCH ULTRASOFT LANCETS [address] [facility] Patient to test blood sugar up to 4 times daily. 11/7/19   Amrit Crespo MD   Lancet Devices (LANCING DEVICE) MISC Provide patient with lancing device appropriate for his machine/lancing needles. 6/4/19   Travis Mosley MD   Lidocaine 4 % LOTN Apply topically    Historical Provider, MD   Spacer/Aero Chamber Mouthpiece MISC 1 each by Does not apply route once as needed (to be used with his inhalers) 4/15/19 5/24/21  Travis Mosley MD   metolazone (ZAROXOLYN) 2.5 MG tablet Take 2.5 mg by mouth three times a week MWF    Historical Provider, MD   Handicap Placard MISC by Does not apply route Can't walk greater than 200 feet. Expires in 5 years. 2/13/19   Paige Bryant MD   fluticasone (CUTIVATE) 0.05 % cream Apply topically 2 times daily  4/19/17   Historical Provider, MD   fluticasone (FLONASE) 50 MCG/ACT nasal spray 2 sprays by Nasal route daily  Patient taking differently: 2 sprays by Nasal route daily as needed (sinus symptoms)  1/16/17   Travis Mosley MD   Melatonin 10 MG TABS Take 10 mg by mouth nightly as needed (insomnia) 12/23/16   Travis Mosley MD   aspirin 81 MG EC tablet Take 81 mg by mouth daily.     Historical Provider, MD       Immunizations:   Most Recent Immunizations   Administered Date(s) Administered    DT (pediatric) 12/14/1998    Hepatitis B Adult (Engerix-B) 12/04/2019    Hepatitis B Adult (Recombivax HB) 12/04/2019    Influenza Virus Vaccine 10/12/2015    Influenza, Quadv, IM, PF (6 mo and older Fluzone, Flulaval, Fluarix, and 3 yrs and older Afluria) 10/09/2020    Pneumococcal Polysaccharide ([id number]) 05/23/2013    Tdap (Boostrix, Adacel) 09/12/2018       Home Blood Glucose Results:   Per patient's Freestyle Lianne report below. Blood sugar control has worsened as patient indicates he has not been taking his insulin and has been skipping meals. Assessment     A1c at goal:No:  8.0 (4/23/21) (decreasing)  Blood Pressure at goal: 106/59 on 5/14/21  Weight at goal: No:    Physical activity: No  Physical activity at goal: No  Patient encouraged but unable to due SOB. Smoking Cessation: Yes    Cholesterol at target: Yes  Annual eye exam completed: Yes  Comprehensive Foot Exam Completed: Yes  Annual urine albumin and serum creatinine: Yes    Statin: Yes    Appropriate?: Yes  Changes made: refill provided    ACE/ARB: Yes  Appropriate?: Yes  Changes made:     Aspirin: Yes  Appropriate?: Yes  Changes made:     Eating patterns:    [x]  My plate    []  Mediterranean diet   []  Low sodium   []  DASH diet   [x]  Portion control   [x]  Reduced calorie    []  Fast food / Restaurant  []  High glycemic index foods   []  Sugary beverages   []  Other     Comment: see above    Current Medications Affecting Diabetes:  Humulin R 500    Compliant: Yes  Barriers to medication therapy: anxiety / depression    Medications attempted in the past:  Metformin - cardiologist took him off but patient unsure why  Januvia - PCP took him off but patient unsure why    Recent exacerbations / new problems:  Infection / fall - ankle pain    Last office dictation reviewed: yes        type 2 DM under improving control as evidenced by A1C 8.0 on 4/23/21    Plan   Counseling at today's visit:     -Continued monitoring of blood glucose with use of Freestyle Lianne. Call with any issues with sensor. Bring data cord to each visit.   Try methods that we provided information on to help keep sensor attached.     -Continued dose of insulin:0.52ml with breakfast, 0.52ml with lunch 0.45ml with dinner with improved compliance.       -Patient has enough diabetic supplies and medications. -Get blood work done prior to next appt. Patient given log to document time and what he eats as far as meals. Will compare blood sugars around meals to determine need for adjustment. Patient to also write down times he takes insulin to correlate with reports from [address] [place] [address] Pradeep [place]. Physician Follow-up:   Dr Jocelyne Chandler - 7/14/2`    Medication Management Follow-up:   Diabetes Service   2 weeks in person. Electronically signed by Alia Fernandez RPH on 6/16/2021 at 7:18 AM     CLINICAL PHARMACY CONSULT: MED RECONCILIATION/REVIEW [facility] 22. Tracking Only    PHSO: No  Total # of Interventions Recommended: 3  - Updated Order #: 3 Updated Order Reason(s):  Other  Total Interventions Accepted: 3  Time Spent (min): 45    Yesenia Rizvi RPH, PharmD  [facility]

## 2021-06-17 ENCOUNTER — PATIENT MESSAGE (OUTPATIENT)
Dept: FAMILY MEDICINE CLINIC | Age: 56
End: 2021-06-17

## 2021-06-17 DIAGNOSIS — M54.42 CHRONIC MIDLINE LOW BACK PAIN WITH LEFT-SIDED SCIATICA: ICD-10-CM

## 2021-06-17 DIAGNOSIS — M51.36 DDD (DEGENERATIVE DISC DISEASE), LUMBAR: ICD-10-CM

## 2021-06-17 DIAGNOSIS — M96.1 POSTLAMINECTOMY SYNDROME OF LUMBAR REGION: ICD-10-CM

## 2021-06-17 DIAGNOSIS — M48.061 SPINAL STENOSIS OF LUMBAR REGION WITHOUT NEUROGENIC CLAUDICATION: ICD-10-CM

## 2021-06-17 DIAGNOSIS — G89.29 CHRONIC MIDLINE LOW BACK PAIN WITH LEFT-SIDED SCIATICA: ICD-10-CM

## 2021-06-17 RX ORDER — OXYCODONE HYDROCHLORIDE 10 MG/1
10 TABLET ORAL EVERY 8 HOURS PRN
Qty: 90 TABLET | Refills: 0 | Status: SHIPPED | OUTPATIENT
Start: 2021-06-17 | End: 2021-07-15 | Stop reason: SDUPTHER

## 2021-06-17 NOTE — TELEPHONE ENCOUNTER
Controlled Substance Monitoring:    Acute and Chronic Pain Monitoring:   RX Monitoring 6/17/2021   Attestation -   Periodic Controlled Substance Monitoring No signs of potential drug abuse or diversion identified.    Chronic Pain > 50 MEDD -   Chronic Pain > 80 MEDD -

## 2021-06-17 NOTE — TELEPHONE ENCOUNTER
From: Yana Preston To: Corin Craft MD  Sent: 6/17/2021 10:21 AM EDT  Subject: Non-Urgent Medical Question    Can you please refill my pain meds oxycodone one ever 8 hours.  And I will stop smoking Pot I don't want to go to pain Management so please Bear with me just please give me a chance thank you so much and have a great day

## 2021-06-24 ENCOUNTER — HOSPITAL ENCOUNTER (OUTPATIENT)
Age: 56
Discharge: HOME | End: 2021-06-24
Payer: COMMERCIAL

## 2021-06-24 DIAGNOSIS — D64.9 ANEMIA, UNSPECIFIED TYPE: ICD-10-CM

## 2021-06-24 DIAGNOSIS — N18.30 BENIGN HYPERTENSIVE HEART AND CKD, STAGE 3 (GFR 30-59), W CHF (HCC): Primary | ICD-10-CM

## 2021-06-24 DIAGNOSIS — I13.0 BENIGN HYPERTENSIVE HEART AND CKD, STAGE 3 (GFR 30-59), W CHF (HCC): ICD-10-CM

## 2021-06-24 DIAGNOSIS — Z79.4 TYPE 2 DIABETES MELLITUS WITH DIABETIC POLYNEUROPATHY, WITH LONG-TERM CURRENT USE OF INSULIN (HCC): ICD-10-CM

## 2021-06-24 DIAGNOSIS — N18.30 BENIGN HYPERTENSIVE HEART AND CKD, STAGE 3 (GFR 30-59), W CHF (HCC): ICD-10-CM

## 2021-06-24 DIAGNOSIS — I13.0 BENIGN HYPERTENSIVE HEART AND CKD, STAGE 3 (GFR 30-59), W CHF (HCC): Primary | ICD-10-CM

## 2021-06-24 DIAGNOSIS — E11.42 TYPE 2 DIABETES MELLITUS WITH DIABETIC POLYNEUROPATHY, WITH LONG-TERM CURRENT USE OF INSULIN (HCC): ICD-10-CM

## 2021-06-24 LAB
ABSOLUTE EOS #: 0.3 K/UL (ref 0–0.4)
ABSOLUTE IMMATURE GRANULOCYTE: ABNORMAL K/UL (ref 0–0.3)
ABSOLUTE LYMPH #: 1.1 K/UL (ref 1–4.8)
ABSOLUTE MONO #: 0.6 K/UL (ref 0.1–1.3)
ALBUMIN SERPL-MCNC: 3.7 G/DL (ref 3.5–5.2)
ALBUMIN/GLOBULIN RATIO: ABNORMAL (ref 1–2.5)
ALP BLD-CCNC: 85 U/L (ref 40–129)
ALT SERPL-CCNC: 10 U/L (ref 5–41)
ANION GAP SERPL CALCULATED.3IONS-SCNC: 12 MMOL/L (ref 9–17)
AST SERPL-CCNC: 10 U/L
BASOPHILS # BLD: 1 % (ref 0–2)
BASOPHILS ABSOLUTE: 0 K/UL (ref 0–0.2)
BILIRUB SERPL-MCNC: 0.34 MG/DL (ref 0.3–1.2)
BNP INTERPRETATION: NORMAL
BUN BLDV-MCNC: 39 MG/DL (ref 6–20)
BUN/CREAT BLD: ABNORMAL (ref 9–20)
CALCIUM SERPL-MCNC: 8.7 MG/DL (ref 8.6–10.4)
CHLORIDE BLD-SCNC: 100 MMOL/L (ref 98–107)
CO2: 27 MMOL/L (ref 20–31)
CREAT SERPL-MCNC: 1.7 MG/DL (ref 0.7–1.2)
DIFFERENTIAL TYPE: ABNORMAL
EOSINOPHILS RELATIVE PERCENT: 3 % (ref 0–4)
GFR AFRICAN AMERICAN: 51 ML/MIN
GFR NON-AFRICAN AMERICAN: 42 ML/MIN
GFR SERPL CREATININE-BSD FRML MDRD: ABNORMAL ML/MIN/{1.73_M2}
GFR SERPL CREATININE-BSD FRML MDRD: ABNORMAL ML/MIN/{1.73_M2}
GLUCOSE BLD-MCNC: 127 MG/DL (ref 70–99)
HCT VFR BLD CALC: 31.4 % (ref 41–53)
HEMOGLOBIN: 10.3 G/DL (ref 13.5–17.5)
IMMATURE GRANULOCYTES: ABNORMAL %
LYMPHOCYTES # BLD: 12 % (ref 24–44)
MAGNESIUM: 1.9 MG/DL (ref 1.6–2.6)
MCH RBC QN AUTO: 28.6 PG (ref 26–34)
MCHC RBC AUTO-ENTMCNC: 32.9 G/DL (ref 31–37)
MCV RBC AUTO: 86.9 FL (ref 80–100)
MONOCYTES # BLD: 6 % (ref 1–7)
NRBC AUTOMATED: ABNORMAL PER 100 WBC
PDW BLD-RTO: 16 % (ref 11.5–14.9)
PHOSPHORUS: 3.2 MG/DL (ref 2.5–4.5)
PLATELET # BLD: 181 K/UL (ref 150–450)
PLATELET ESTIMATE: ABNORMAL
PMV BLD AUTO: 9.2 FL (ref 6–12)
POTASSIUM SERPL-SCNC: 4.2 MMOL/L (ref 3.7–5.3)
PRO-BNP: 147 PG/ML
RBC # BLD: 3.61 M/UL (ref 4.5–5.9)
RBC # BLD: ABNORMAL 10*6/UL
SEG NEUTROPHILS: 78 % (ref 36–66)
SEGMENTED NEUTROPHILS ABSOLUTE COUNT: 7.2 K/UL (ref 1.3–9.1)
SODIUM BLD-SCNC: 139 MMOL/L (ref 135–144)
TOTAL PROTEIN: 6.8 G/DL (ref 6.4–8.3)
WBC # BLD: 9.2 K/UL (ref 3.5–11)
WBC # BLD: ABNORMAL 10*3/UL

## 2021-06-24 PROCEDURE — 80053 COMPREHEN METABOLIC PANEL: CPT

## 2021-06-24 PROCEDURE — 84100 ASSAY OF PHOSPHORUS: CPT

## 2021-06-24 PROCEDURE — 83880 ASSAY OF NATRIURETIC PEPTIDE: CPT

## 2021-06-24 PROCEDURE — 83735 ASSAY OF MAGNESIUM: CPT

## 2021-06-24 PROCEDURE — 85025 COMPLETE CBC W/AUTO DIFF WBC: CPT

## 2021-06-24 PROCEDURE — 36415 COLL VENOUS BLD VENIPUNCTURE: CPT

## 2021-06-24 NOTE — RESULT ENCOUNTER NOTE
Please notify patient.   Blood glucose greatly improved 127, improved chronic kidney disease stage III  Anemia is worsening I would like him to see Dr. Valencia Park his nephrologist      Future Appointments  6/28/2021  9:50 AM    ST MEDICATION MGMT       ST MED MGMT        [facility]  7/20/2021  8:00 AM    Maria Isabel Freedman MD      sc               [id number]  8/6/2021   10:40 AM   Minerva Schuler MD [address] [place] [address]

## 2021-06-28 ENCOUNTER — HOSPITAL ENCOUNTER (OUTPATIENT)
Dept: PHARMACY | Age: 56
Setting detail: THERAPIES SERIES
Discharge: HOME | End: 2021-06-28
Payer: COMMERCIAL

## 2021-06-28 ENCOUNTER — TELEPHONE (OUTPATIENT)
Dept: PHARMACY | Age: 56
End: 2021-06-28

## 2021-06-28 PROCEDURE — 99212 OFFICE O/P EST SF 10 MIN: CPT

## 2021-06-28 RX ORDER — INSULIN HUMAN 500 [IU]/ML
INJECTION, SOLUTION SUBCUTANEOUS
Qty: 60 ML | Refills: 3 | Status: SHIPPED | OUTPATIENT
Start: 2021-06-28 | End: 2022-03-07

## 2021-06-28 RX ORDER — BLOOD SUGAR DIAGNOSTIC
STRIP MISCELLANEOUS
Qty: 300 EACH | Refills: 2 | Status: SHIPPED | OUTPATIENT
Start: 2021-06-28 | End: 2021-09-22 | Stop reason: SDUPTHER

## 2021-06-28 NOTE — PROGRESS NOTES
Diabetic Medication Management   [facility] [address]    [address] [facility]. [place], [address] [facility]  Phone: [phone]  Fax: [phone]    NAME: Wayne Renteria. MEDICAL RECORD NUMBER:  [id number]  AGE: 63 y.o. GENDER: male  : 1965  EPISODE DATE:  2021       Mr. Jah Hightower was referred to [facility] by Dr. Ole Vigil, Special instructions include: titrate all medications (as defined in clinic's policy and procedure)    Patient seen in office. Goals per referral:   Fasting blood glucose: < 130  Peak postprandial glucose: < 180  A1C: < 7    Other goals:  Blood pressure goal: 130/80  Weight loss goal (~10%): Target weight  410 to be reached by date: Aug 2021 (3-6 months)  Physical Activity goal:    At this time regular exercise is not realistic for patient so no goal set. Smoking Cessation   Quit Date: Stopped 20. Cholesterol at target:   Date: Yes LDL 82  HDL 33 Trig 180 (20)  Annual eye exam:    Date: 2021  Comprehensive annual foot exam:   Date:    Annual urine Albumin and serum creatinine:   Date:  microalbumin <12 mg/L (21), SrCr 1.70 mg/dL (21) eGFR 44.5 ml/min        Subjective   Mr. Jah Hightower is a 63 y.o. male here for the Diabetes Service for self-management education, medication review including over the counter medications and herbal products, overall well-being assessment, transition of care and any needed adjustments with updates and recommendations communicated to the referring physician. Patient's name and  verified. Patient Findings:    Pain from umbilicus around right hand side to back has been bad and expanding for last week. Nothing other than normal pain medication. Worse when eats. Has told PCP and labs done. Was told he has to see nephrologist.  Review of labs show slightly improved SrCr and glucose but hemoglobin that has decreased from 11.4 in 2021 to 10.3.   Patient has bloodwork on file in Epic to check iron, O85 and folic acid but was not aware. Encouraged him to have these completed before  when orders . Patient states he does not want to get them done today but plans to get them done later this week. Expresses continued and increased frustration with not feeling well and one thing after another occurring with sense that nothing is getting better. States he filled out paperwork and DNR. Patient states he has no plans to harm himself but admits he does not always do what he knows is best for him. Admits he has not been taking his insulin as instructed. Always takes correct dose but often skips one to two doses of insulin per day. Discussed importance to taking insulin correctly and that increased blood sugar will only cause him more discomfort. Patient states he understands and will try to do better with taking insulin as directed. Patient continues to skip meals or just eat a snack instead of full meal.  Stays in room often to avoid contact with other family members. Has peanut butter, crackers and pretzels in room and states granddaughter will make him cut up fruit at times. Can not eat veges unless cooked very well due to lack of teeth. Encouraged him to at least have a snack rather than skip meal all together. Did purchase patches to go over Chew sensors but has not had to use as current sensors staying on much better. Current sensor has been on for 13 days with no issue. See blood sugar control below. Blood sugar control over last two weeks has been worse than prior to last appt due to noncompliance with insulin eating. Leg pain continues and at times has worsened. Overall pain has been worse because patient has stopped smoking any marijuana for about a week. States he plans to stay off as he can not get regular pain meds from PCP if he continues. Does admit to smoking 3 cigarettes about a week ago.  Was talking with his sister who he states was 10/2/2016    Morbid obesity with BMI of 45.0-49.9, adult ([facility] [address] 2015    On home oxygen therapy     3 Lpm prn    Open wound of groin 2018    healed     BARBY on CPAP     Osteoarthritis     Otitis externa of left ear     Pancreatitis chronic     Persistent depressive disorder 2019    Renal insufficiency     proteinuria    Severe depression ([address] 2013    Spinal stenosis of lumbar region without neurogenic claudication 2016    MRI lumbar 12/30/15 L3-L4: There is broad-based bulging disc which appears protruding left laterally causing flattening of the ventral thecal sac. In addition, there is facet arthropathy with mild hypertrophic changes.  There is borderline central canal stenosis with  evidence of moderate left neural foraminal narrowing and mild right neural foramina narrowing.   L4-L5: There is broad-based protrud    Syncope 2017    Tinnitus of both ears 1/10/2017    Per ENT    Type 2 diabetes mellitus with stage 3 chronic kidney disease, with long-term current use of insulin ([facility] [address] 2016    due to underlying condition with hyperosmolarity without coma    Type II or unspecified type diabetes mellitus without mention of complication, not stated as uncontrolled     uncontrolled    Vitamin D deficiency     Wears glasses     for reading       Social History:    Social History     Tobacco Use    Smoking status: Former Smoker     Packs/day: 0.25     Years: 33.00     Pack years: 8.25     Types: Cigarettes     Start date: 1985     Quit date: 2020     Years since quittin.6    Smokeless tobacco: Former User     Types: Snuff     Quit date: 1995   Substance Use Topics    Alcohol use: No     Alcohol/week: 0.0 standard drinks       Pertinent Labs:    Lab Results   Component Value Date    LABA1C 8.0 2021    LABA1C 8.7 (H) 2021    LABA1C 7.3 (H) 2020     Lab Results   Component Value Date    CHOL 151 2020    TRIG 180 (H) 2020 4/13/21   Jovanni Reddy MD   amitriptyline (ELAVIL) 50 MG tablet Take 2 po qhs 4/9/21   Brant Hands, MD   nystatin (MYCOSTATIN) 127011 UNIT/GM powder Apply 2 times daily in the skin folds for several months 3/31/21   Jovanni Reddy MD   tiZANidine (ZANAFLEX) 4 MG tablet TAKE ONE TABLET BY MOUTH EVERY 8 HOURS AS NEEDED FOR BACK PAIN 3/31/21   Jovanni Reddy MD   bumetanide (BUMEX) 1 MG tablet TAKE THREE TABLETS BY MOUTH TWICE A DAY 3/8/21   Jovanni Reddy MD   spironolactone (ALDACTONE) 50 MG tablet Take 1 tablet by mouth daily 2/24/21   Jovanni Reddy MD   vitamin D3 (CHOLECALCIFEROL) 25 MCG (1000 UT) TABS tablet Take 1 tablet by mouth daily 2/24/21   Jovanni Reddy MD   ammonium lactate (LAC-HYDRIN) 12 % lotion Apply topically daily. For dry skin 2/23/21   Jovanni Reddy MD   amitriptyline (ELAVIL) 25 MG tablet Take 1 po qhs x 2 days then 2 po qhs 2/5/21   Brant Hands, MD   clobetasol (TEMOVATE) 0.05 % ointment Apply topically 2 times daily for psoriasis 1/27/21   Jovanni Reddy MD   ketoconazole (NIZORAL) 2 % cream Apply twice a day for yeast infection in the skin folds, for 4 weeks 1/20/21   Jovanni Reddy MD   insulin regular human (HUMULIN R) 500 UNIT/ML concentrated injection vial Patient using 0.52ml with breakfast, 0.52ml with lunch and 0.45ml with dinner.  1/20/21   Jj Beltran MD   albuterol (PROVENTIL) (2.5 MG/3ML) 0.083% nebulizer solution Take 3 mLs by nebulization every 6 hours as needed for Wheezing or Shortness of Breath 1/15/21   Jovanni Reddy MD   pantoprazole (PROTONIX) 40 MG tablet Take 1 tablet by mouth every morning (before breakfast) 1/13/21   Jovanni Reddy MD   Ferrous Sulfate (IRON) 325 (65 Fe) MG TABS Take 1 tablet by mouth daily 11/25/20   Jovanni Reddy MD   metoprolol tartrate (LOPRESSOR) 50 MG tablet Take 1 tablet by mouth 2 times daily 11/4/20   Jovanni Reddy MD   magnesium oxide (MAG-OX) 400 (240 Mg) MG tablet Take 1 tablet by mouth daily 11/4/20   Travis Mosley MD   lisinopril (PRINIVIL;ZESTRIL) 5 MG tablet Take 1 tablet by mouth daily . Discontinued Lisinopril  10 mg 11/2/20   Travis Mosley MD   fluticasone-salmeterol (ADVAIR HFA) 230-21 MCG/ACT inhaler Inhale 2 puffs into the lungs 2 times daily 10/19/20   Travis Mosley MD   tiotropium (SPIRIVA RESPIMAT) 2.5 MCG/ACT AERS inhaler Inhale 2 puffs into the lungs daily 10/19/20   Travis Mosley MD   venlafaxine (EFFEXOR XR) 75 MG extended release capsule Take 1 capsule by mouth daily Take with food 10/14/20   Travis Mosley MD   naloxone 4 MG/0.1ML LIQD nasal spray 1 spray by Nasal route as needed for Opioid Reversal Due to COPD and sleep apnea, this is a big recommendation for you 9/30/20   Travis Mosley MD   blood glucose monitor strips Test 3 times a day & as needed for symptoms of irregular blood glucose. Dispense sufficient amount for indicated testing frequency plus additional to accommodate PRN testing needs. One touch Ultra blue 9/30/20   Amrit Crespo MD   Lancets MISC Use to check blood sugar three times daily along with when necessary due to symptoms. 9/30/20   Amrit Crespo MD   Insulin Syringe-Needle U-100 31G X 5/16\" 1 ML MISC Use to subcutaneously inject insulin three times daily 9/30/20   Amrit Crespo MD   clopidogrel (PLAVIX) 75 MG tablet TAKE ONE TABLET BY MOUTH DAILY 7/29/20   Travis Mosley MD   vitamin B-12 (CYANOCOBALAMIN) 500 MCG tablet Take 1 tablet by mouth daily 7/13/20   Travis Mosley MD   Oxygen Tubing MISC by Does not apply route DX COPD.  chronic respiratory failure 3/26/20   Travis Mosley MD   Respiratory Therapy Supplies (NEBULIZER/TUBING/MOUTHPIECE) KIT Dx COPD needs nebulizer supplies 2/20/20   Travis Mosley MD   nitroGLYCERIN (NITROSTAT) 0.4 MG SL tablet Place 1 tablet under the tongue every 5 minutes as needed for Chest pain (and call 911) 2/13/20 (4/23/21) (decreasing)  Blood Pressure at goal: 106/59 on 5/14/21  Weight at goal: No:    Physical activity: No  Physical activity at goal: No  Patient encouraged but unable to due SOB. Smoking Cessation: Yes    Cholesterol at target: Yes  Annual eye exam completed: Yes  Comprehensive Foot Exam Completed: Yes  Annual urine albumin and serum creatinine: Yes    Statin: Yes    Appropriate?: Yes  Changes made: refill provided    ACE/ARB: Yes  Appropriate?: Yes  Changes made:     Aspirin: Yes  Appropriate?: Yes  Changes made:     Eating patterns:    [x]  My plate    []  Mediterranean diet   []  Low sodium   []  DASH diet   [x]  Portion control   [x]  Reduced calorie    []  Fast food / Restaurant  []  High glycemic index foods   []  Sugary beverages   []  Other     Comment: see above    Current Medications Affecting Diabetes:  Humulin R 500    Compliant:No  Barriers to medication therapy: anxiety / depression    Medications attempted in the past:  Metformin - cardiologist took him off but patient unsure why  Januvia - PCP took him off but patient unsure why    Recent exacerbations / new problems:  Infection / fall - ankle pain    Last office dictation reviewed: yes        type 2 DM under improving control as evidenced by A1C 8.0 on 4/23/21 but worsening most recent as evidenced by Joshua Adam reading    Plan   Counseling at today's visit:     -Continued monitoring of blood glucose with use of Freestyle Lianne. Call with any issues with sensor. Bring data cord to each visit. Try methods that we provided information on to help keep sensor attached if needed. .     -Continued dose of insulin:0.52ml with breakfast, 0.52ml with lunch 0.45ml with dinner with improved compliance. - refills on insulin and insulin syringes. -Get blood work done prior to 7/2. Patient has log to document time and what he eats as far as meals given to him at previous visit.   Will compare blood sugars around meals to determine need for adjustment. Patient to also write down times he takes insulin to correlate with reports from [address] [place] [address] Pradeep [place] [place]. Forgot to do before this appt but intends to complete before next appt. Physician Follow-up:   Dr Micaela Kilgore - 7/14/2`    Medication Management Follow-up:   Diabetes Service   4 weeks in person. Electronically signed by Marvin Rios RPH on 6/28/2021 at 9:49 AM     CLINICAL PHARMACY CONSULT: MED RECONCILIATION/REVIEW Geovani  22. Tracking Only    PHSO: No  Total # of Interventions Recommended: 3  - Updated Order #: 3 Updated Order Reason(s):  Other  Total Interventions Accepted: 3  Time Spent (min): 45    Yesenia Rizvi RPH, PharmD  [address]

## 2021-06-28 NOTE — TELEPHONE ENCOUNTER
Refills on insulin syringes and humulin 500 sent to [facility] on [facility]. Yesenia Rizvi RPH,Pharm. D,, BCPS, CACP  6/28/2021  5:21 PM

## 2021-07-15 ENCOUNTER — PATIENT MESSAGE (OUTPATIENT)
Dept: FAMILY MEDICINE CLINIC | Age: 56
End: 2021-07-15

## 2021-07-15 DIAGNOSIS — M96.1 POSTLAMINECTOMY SYNDROME OF LUMBAR REGION: ICD-10-CM

## 2021-07-15 DIAGNOSIS — M54.42 CHRONIC MIDLINE LOW BACK PAIN WITH LEFT-SIDED SCIATICA: ICD-10-CM

## 2021-07-15 DIAGNOSIS — M51.36 DDD (DEGENERATIVE DISC DISEASE), LUMBAR: ICD-10-CM

## 2021-07-15 DIAGNOSIS — G89.29 CHRONIC MIDLINE LOW BACK PAIN WITH LEFT-SIDED SCIATICA: ICD-10-CM

## 2021-07-15 DIAGNOSIS — M48.061 SPINAL STENOSIS OF LUMBAR REGION WITHOUT NEUROGENIC CLAUDICATION: Primary | ICD-10-CM

## 2021-07-15 RX ORDER — OXYCODONE HYDROCHLORIDE 10 MG/1
10 TABLET ORAL EVERY 8 HOURS PRN
Qty: 90 TABLET | Refills: 0 | Status: SHIPPED | OUTPATIENT
Start: 2021-07-15 | End: 2021-08-12 | Stop reason: SDUPTHER

## 2021-07-15 NOTE — TELEPHONE ENCOUNTER
From: Jinny Lopez. To: Lynann Apgar, MD  Sent: 7/15/2021 8:29 AM EDT  Subject: Non-Urgent Medical Question    Can you please refill my pain meds for me oxycoDone 10 mg one ever 8 hours.  I did stop smoking pot it has been 21 days and counting I will be out Saturday thank you so much and have a great day

## 2021-07-20 ENCOUNTER — OFFICE VISIT (OUTPATIENT)
Dept: FAMILY MEDICINE CLINIC | Age: 56
End: 2021-07-20
Payer: COMMERCIAL

## 2021-07-20 ENCOUNTER — PATIENT MESSAGE (OUTPATIENT)
Dept: FAMILY MEDICINE CLINIC | Age: 56
End: 2021-07-20

## 2021-07-20 VITALS
WEIGHT: 315 LBS | BODY MASS INDEX: 42.66 KG/M2 | TEMPERATURE: 97.8 F | SYSTOLIC BLOOD PRESSURE: 130 MMHG | HEART RATE: 74 BPM | HEIGHT: 72 IN | DIASTOLIC BLOOD PRESSURE: 84 MMHG | OXYGEN SATURATION: 98 %

## 2021-07-20 DIAGNOSIS — G89.29 CHRONIC MIDLINE LOW BACK PAIN WITH LEFT-SIDED SCIATICA: ICD-10-CM

## 2021-07-20 DIAGNOSIS — I50.32 CHRONIC DIASTOLIC CONGESTIVE HEART FAILURE (HCC): Primary | ICD-10-CM

## 2021-07-20 DIAGNOSIS — Z12.5 PROSTATE CANCER SCREENING: ICD-10-CM

## 2021-07-20 DIAGNOSIS — M96.1 POSTLAMINECTOMY SYNDROME OF LUMBAR REGION: ICD-10-CM

## 2021-07-20 DIAGNOSIS — K21.9 GASTROESOPHAGEAL REFLUX DISEASE WITHOUT ESOPHAGITIS: ICD-10-CM

## 2021-07-20 DIAGNOSIS — N18.30 BENIGN HYPERTENSIVE HEART AND CKD, STAGE 3 (GFR 30-59), W CHF (HCC): ICD-10-CM

## 2021-07-20 DIAGNOSIS — E78.5 HYPERLIPIDEMIA WITH TARGET LDL LESS THAN 70: ICD-10-CM

## 2021-07-20 DIAGNOSIS — Z79.4 TYPE 2 DIABETES MELLITUS WITH DIABETIC POLYNEUROPATHY, WITH LONG-TERM CURRENT USE OF INSULIN (HCC): ICD-10-CM

## 2021-07-20 DIAGNOSIS — E11.42 TYPE 2 DIABETES MELLITUS WITH DIABETIC POLYNEUROPATHY, WITH LONG-TERM CURRENT USE OF INSULIN (HCC): ICD-10-CM

## 2021-07-20 DIAGNOSIS — I13.0 BENIGN HYPERTENSIVE HEART AND CKD, STAGE 3 (GFR 30-59), W CHF (HCC): ICD-10-CM

## 2021-07-20 DIAGNOSIS — D64.9 ANEMIA, UNSPECIFIED TYPE: ICD-10-CM

## 2021-07-20 DIAGNOSIS — M54.42 CHRONIC MIDLINE LOW BACK PAIN WITH LEFT-SIDED SCIATICA: ICD-10-CM

## 2021-07-20 LAB
AVERAGE GLUCOSE: ABNORMAL
HBA1C MFR BLD: 7.6 %

## 2021-07-20 PROCEDURE — 99214 OFFICE O/P EST MOD 30 MIN: CPT | Performed by: FAMILY MEDICINE

## 2021-07-20 PROCEDURE — G8417 CALC BMI ABV UP PARAM F/U: HCPCS | Performed by: FAMILY MEDICINE

## 2021-07-20 PROCEDURE — 3017F COLORECTAL CA SCREEN DOC REV: CPT | Performed by: FAMILY MEDICINE

## 2021-07-20 PROCEDURE — 2022F DILAT RTA XM EVC RTNOPTHY: CPT | Performed by: FAMILY MEDICINE

## 2021-07-20 PROCEDURE — 3051F HG A1C>EQUAL 7.0%<8.0%: CPT | Performed by: FAMILY MEDICINE

## 2021-07-20 PROCEDURE — 1036F TOBACCO NON-USER: CPT | Performed by: FAMILY MEDICINE

## 2021-07-20 PROCEDURE — G8427 DOCREV CUR MEDS BY ELIG CLIN: HCPCS | Performed by: FAMILY MEDICINE

## 2021-07-20 RX ORDER — PANTOPRAZOLE SODIUM 40 MG/1
40 TABLET, DELAYED RELEASE ORAL
Qty: 90 TABLET | Refills: 1 | Status: SHIPPED | OUTPATIENT
Start: 2021-07-20 | End: 2022-01-28 | Stop reason: SDUPTHER

## 2021-07-20 SDOH — ECONOMIC STABILITY: HOUSING INSECURITY
IN THE LAST 12 MONTHS, WAS THERE A TIME WHEN YOU DID NOT HAVE A STEADY PLACE TO SLEEP OR SLEPT IN A SHELTER (INCLUDING NOW)?: NO

## 2021-07-20 SDOH — ECONOMIC STABILITY: TRANSPORTATION INSECURITY
IN THE PAST 12 MONTHS, HAS LACK OF TRANSPORTATION KEPT YOU FROM MEETINGS, WORK, OR FROM GETTING THINGS NEEDED FOR DAILY LIVING?: NO

## 2021-07-20 SDOH — ECONOMIC STABILITY: FOOD INSECURITY: WITHIN THE PAST 12 MONTHS, THE FOOD YOU BOUGHT JUST DIDN'T LAST AND YOU DIDN'T HAVE MONEY TO GET MORE.: NEVER TRUE

## 2021-07-20 SDOH — ECONOMIC STABILITY: INCOME INSECURITY: IN THE LAST 12 MONTHS, WAS THERE A TIME WHEN YOU WERE NOT ABLE TO PAY THE MORTGAGE OR RENT ON TIME?: NO

## 2021-07-20 SDOH — ECONOMIC STABILITY: FOOD INSECURITY: WITHIN THE PAST 12 MONTHS, YOU WORRIED THAT YOUR FOOD WOULD RUN OUT BEFORE YOU GOT MONEY TO BUY MORE.: NEVER TRUE

## 2021-07-20 SDOH — ECONOMIC STABILITY: TRANSPORTATION INSECURITY
IN THE PAST 12 MONTHS, HAS THE LACK OF TRANSPORTATION KEPT YOU FROM MEDICAL APPOINTMENTS OR FROM GETTING MEDICATIONS?: NO

## 2021-07-20 ASSESSMENT — SOCIAL DETERMINANTS OF HEALTH (SDOH): HOW HARD IS IT FOR YOU TO PAY FOR THE VERY BASICS LIKE FOOD, HOUSING, MEDICAL CARE, AND HEATING?: NOT HARD AT ALL

## 2021-07-20 ASSESSMENT — PATIENT HEALTH QUESTIONNAIRE - PHQ9
SUM OF ALL RESPONSES TO PHQ QUESTIONS 1-9: 0
1. LITTLE INTEREST OR PLEASURE IN DOING THINGS: 0
SUM OF ALL RESPONSES TO PHQ9 QUESTIONS 1 & 2: 0
SUM OF ALL RESPONSES TO PHQ QUESTIONS 1-9: 0
2. FEELING DOWN, DEPRESSED OR HOPELESS: 0
SUM OF ALL RESPONSES TO PHQ QUESTIONS 1-9: 0

## 2021-07-20 ASSESSMENT — ENCOUNTER SYMPTOMS
DIARRHEA: 0
CHEST TIGHTNESS: 0
SHORTNESS OF BREATH: 1
APNEA: 1
ABDOMINAL DISTENTION: 0
ABDOMINAL PAIN: 1
VOMITING: 0
COUGH: 0
WHEEZING: 0
NAUSEA: 0
CONSTIPATION: 0
BACK PAIN: 1

## 2021-07-20 NOTE — TELEPHONE ENCOUNTER
From: Severa Meeter.   To: Nasim Ballesteros MD  Sent: 7/20/2021 9:36 AM EDT  Subject: Non-Urgent Medical Question    Will you please refill my pantoprazole

## 2021-07-20 NOTE — PROGRESS NOTES
Visit Information    Have you changed or started any medications since your last visit including any over-the-counter medicines, vitamins, or herbal medicines? no   Are you having any side effects from any of your medications? -  no  Have you stopped taking any of your medications? Is so, why? -  no    Have you seen any other physician or provider since your last visit? No  Have you had any other diagnostic tests since your last visit? No  Have you been seen in the emergency room and/or had an admission to a hospital since we last saw you? No  Have you had your routine dental cleaning in the past 6 months? no    Have you activated your [employer] account? If not, what are your barriers?  Yes     Patient Care Team:  Artur Garcia MD as PCP - General (Family Medicine)  Artur Garcia MD as PCP - [facility]  Anahi Palacios MD as Consulting Physician (Endocrinology)  Tahir Waller DO as Consulting Physician (Cardiology)  Destiney Flores DPM as Surgeon (Podiatry)  Velia Luther MD as Consulting Physician (Ophthalmology)  Sree Stock MD as Consulting Physician (Nephrology)  Demetri Bellamy MD as Consulting Physician (Pulmonology)  Elli Leon MD as Surgeon (Vascular Surgery)  Zora Schmitz MD as Consulting Physician (Gastroenterology)  [facility], [address] [place] [address] as Pharmacist (Pharmacist)  Jyoti Rocha MD as Consulting Physician (Pulmonology)  Sushil rAzola MD as Consulting Physician (Urology)  Mary Dowd MD as Surgeon (Ophthalmology)  Chun Shah MD as Consulting Physician (Neurology)  Demetri Bellamy DO as Consulting Physician (Otolaryngology)  Zora Schmitz MD as Consulting Physician (Gastroenterology)    Medical History Review  Past Medical, Family, and Social History reviewed and does contribute to the patient presenting condition    Health Maintenance   Topic Date Due    Shingles Vaccine (1 of 2) Never done    Diabetic retinal exam  10/24/2019    Diabetic foot exam  05/02/2020    A1C test (Diabetic or Prediabetic)  07/23/2021    Flu vaccine (1) 09/01/2021    Lipid screen  09/25/2021    Annual Wellness Visit (AWV)  02/24/2022    Potassium monitoring  06/24/2022    Creatinine monitoring  06/24/2022    Colon cancer screen colonoscopy  04/12/2024    DTaP/Tdap/Td vaccine (3 - Td or Tdap) 09/12/2028    Pneumococcal 0-64 years Vaccine (2 of 2 - PPSV23) 12/30/2029    Hepatitis B vaccine  Completed    COVID-19 Vaccine  Completed    Hepatitis C screen  Completed    HIV screen  Completed    Hepatitis A vaccine  Aged Out    Hib vaccine  Aged Out    Meningococcal (ACWY) vaccine  Aged Out

## 2021-07-20 NOTE — RESULT ENCOUNTER NOTE
Addressed during office visit today, A1c 7.6, abnormal, greatly improved diabetes, continue treatment recommended during the office visit

## 2021-07-20 NOTE — PATIENT INSTRUCTIONS
Patient Education        Diabetes Blood Sugar Emergencies: Your Action Plan  How can you prevent a blood sugar emergency? An important part of living with diabetes is keeping your blood sugar in your target range. You'll need to know what to do if it's too high or too low. Managing your blood sugar levels helps you avoid emergencies. This care sheet will teach you about the signs of high and low blood sugar. It will help you make an action plan with your doctor for when these signs occur. Low blood sugar is more likely to happen if you take certain medicines for diabetes. It can also happen if you skip a meal, drink alcohol, or exercise more than usual.  You may get high blood sugar if you eat differently than you normally do. One example is eating more carbohydrate than usual. Having a cold, the flu, or other sudden illness can also cause high blood sugar levels. Levels can also rise if you miss a dose of medicine. Any change in how you take your medicine may affect your blood sugar level. So it's important to work with your doctor before you make any changes. Track your blood sugar  Work with your doctor to fill in the blank spaces below that apply to you. Track your levels, know your target range, and write down ways you can get your blood sugar back in your target range. A log book can help you track your levels. Take the book to all of your medical appointments. · Check your blood sugar _____ times a day, at these times:________________________________________________. (For example: Before meals, at bedtime, before exercise, during exercise, other.)  · Your blood sugar target range before a meal is ___________________. Your blood sugar target range after a meal is _______________________. · Do this___________________________________________________to get your blood sugar back within your safe range if your blood sugar results are _________________________________________.  (For example: Less than 70 or above 250 mg/dL.)  Call your doctor when your blood sugar results are ___________________________________. (For example: Less than 70 or above 250 mg/dL.)  What are the symptoms of low and high blood sugar? Common symptoms of low blood sugar are sweating and feeling shaky, weak, hungry, or confused. Symptoms can start quickly. Common symptoms of high blood sugar are feeling very thirsty or very hungry. You may also pass urine more often than usual. You may have blurry vision and may lose weight without trying. But some people may have high or low blood sugar without having any symptoms. That's a good reason to check your blood sugar on a regular schedule. What should you do if you have symptoms? Work with your doctor to fill in the blank spaces below that apply to you. Low blood sugar and \"the rule of 15\"  If you have symptoms of low blood sugar, check your blood sugar. If it's below _____ ( for example, below 70), eat or drink a quick-sugar food that has about 15 grams of carbohydrate. Your goal is to get your level back to your safe range. Check your blood sugar again 15 minutes later. If it's still not in your target range, take another 15 grams of carbohydrate and check your blood sugar again in 15 minutes. Repeat this until you reach your target. Then go back to your regular testing schedule. Children usually need less than 15 grams of carbohydrate. Check with your doctor or diabetes educator for the amount that is right for your child. When you have low blood sugar, it's best to stop or reduce any physical activity until your blood sugar is back in your target range and is stable. If you must stay active, eat or drink 30 grams of carbohydrate. Then check your blood sugar again in 15 minutes. If it's not in your target range, take another 30 grams of carbohydrates. Check your blood sugar again in 15 minutes. Keep doing this until you reach your target.  You can then go back to your regular testing schedule. If your symptoms or blood sugar levels are getting worse or have not improved after 15 minutes, seek medical care right away. If you take insulin, always carry a glucagon emergency kit. Be sure your family, friends, and coworkers know how to give glucagon. Here are some examples of quick-sugar foods with 15 grams of carbohydrate:  · 3 or 4 glucose tablets  · 1 tablespoon (3 teaspoons) table sugar  · ½ cup to ¾ cup (4 to 6 ounces) of fruit juice or regular (not diet) soda  · Hard candy (such as 6 Life Savers)  High blood sugar  If you have symptoms of high blood sugar, check your blood sugar. Your goal is to get your level back to your target range. If it's above ______ ( for example, above 250), follow these steps:  · If you missed a dose of your diabetes medicine, take it now. Take only the amount of medicine that you have been prescribed. Do not take more or less medicine. · Give yourself insulin if your doctor has prescribed it for high blood sugar. · Test for ketones, if the doctor told you to do so. If the results of the ketone test show a moderate-to-large amount of ketones, call the doctor for advice. · Wait 30 minutes after you take the extra insulin or the missed medicine. Check your blood sugar again. If your symptoms or blood sugar levels are getting worse or have not improved after taking these steps, seek medical care right away. Follow-up care is a key part of your treatment and safety. Be sure to make and go to all appointments, and call your doctor if you are having problems. It's also a good idea to know your test results and keep a list of the medicines you take. Where can you learn more? Go to https://[employer]iel.Pro.com. org and sign in to your [employer] account. Enter W081 in the [employer] box to learn more about \"Diabetes Blood Sugar Emergencies: Your Action Plan. \"     If you do not have an account, please click on the \"Sign Up Now\" link.   Current as of: August 31, 2020               Content Version: 12.9  © 0875-8387 Healthwise, Incorporated. Care instructions adapted under license by [address] If you have questions about a medical condition or this instruction, always ask your healthcare professional. [address] any warranty or liability for your use of this information.

## 2021-07-20 NOTE — PROGRESS NOTES
Luis Carlos Rosales. (:  1965) is a 63 y.o. male,Established patient, here for evaluation of the following chief complaint(s): Diabetes, Hypertension, Congestive Heart Failure, and Back Pain      ASSESSMENT/PLAN:    1. Chronic diastolic congestive heart failure (HCC)  Stable  Lab Results   Component Value Date    LVEF 46 2021    LVEFMODE Echo 2017   Follow-up with cardiology as scheduled  Continue Bumex, Aldactone, metoprolol, lisinopril, aspirin, Crestor, metolazone      -     CBC Auto Differential; Future  -     Comprehensive Metabolic Panel; Future  -     Brain Natriuretic Peptide; Future  Discussed low salt diet and BP, WT and pulse monitoring. Continue the wraps on the legs for lymphedema treatment and chronic leg edema to prevent wounds and water retention    2. Type 2 diabetes mellitus with diabetic polyneuropathy, with long-term current use of insulin (HCC)  Improved  -     Hemoglobin A1C; Future  Lab Results   Component Value Date    LABA1C 7.6 2021    LABA1C 8.0 2021    LABA1C 8.7 (H) 2021       -     CBC Auto Differential; Future  -     Comprehensive Metabolic Panel; Future  -     Vitamin B12 & Folate; Future  -advised home blood glucose testing multiple times throughout the day with floresita  -daily feet exam, Foot care: advised to wash feet daily, pat dry and apply lotion at night, avoiding between toes.  Need to look at feet daily and report to a physician any signs of inflammation or skin damage  -annual dilated eye exam  -Low carb, low fat diet, increase fruits and vegetables, and exercise 4-5 times a day 30-40 minutes a day discussed  -continue current treatment, current adjustment of the short-acting insulin per pharmacist completed  Signs and symptoms of hypoglycemia discussed with the patient and his daughter, advised to avoid skipping meals  Patient tells me that every time his blood glucose drops he is to call his pharmacist directly and they adjust his insulin  -continue Aspirin  -continue ACEI and statin      3. Benign hypertensive heart and CKD, stage 3 (GFR 30-59), w CHF ([address]  Improved chronic kidney disease stage III  Well controlled HTN. Will recheck labs. -     CBC Auto Differential; Future  -     Comprehensive Metabolic Panel; Future  -     Phosphorus; Future  -     Vitamin D 25 Hydroxy; Future  -     Magnesium; Future  -     Urinalysis Reflex to Culture; Future  -     [facility]) - Kathrine Shrestha MD, Nephrology, [place]  Discussed low salt diet and BP and pulse monitoring. Continue current medications    4. Chronic midline low back pain with left-sided sciatica  Improved with medications  Advised stretching, advised to walk more at home  He tells me he completely quit smoking THC for 26 days in order to continue to take his pain medication that helps him stay active and engaged with his family  He does have walker at home, shower bench, cane, he is using a wheelchair today  Continue oxycodone as needed for pain, Elavil 100 mg per Dr. Bossman Tenorio, tizanidine as needed  He does have Narcan at home to counteract side effects from oxycodone if needed, counseling given, patient understands    5. Anemia, unspecified type  Anemia slightly worsening  We will do anemia work-up, continue iron, he already had GI work-up with EGD and colonoscopy, colon polyps removed  -     CBC Auto Differential; Future  -     Protein Electrophoresis, Urine; Future  -     Electrophoresis Protein, Serum without Reflex to Immunofixation; Future  -     Iron and TIBC; Future  -     Ferritin; Future  -     Path Review, Smear; Future  6. Hyperlipidemia with target LDL less than 70  Well-controlled, continue Crestor  -     Lipid Panel; Future  7. Prostate cancer screening  -     PSA screening; Future  The natural history of prostate cancer and ongoing controversy regarding screening and potential treatment outcomes of prostate cancer has been discussed with the patient.  The meaning of a false positive PSA and a false negative PSA has been discussed. He indicates understanding of the limitations of this screening test and wishes to proceed with screening PSA testing. 8. Postlaminectomy syndrome of lumbar region  Stable      Will do FMLA for congestive heart failure for his wife to help him, he tells me that only one diagnosis has to be on his FMLA despite his multiple comorbidities          Controlled Substance Monitoring:    Acute and Chronic Pain Monitoring:   RX Monitoring 7/20/2021   Attestation -   Periodic Controlled Substance Monitoring Possible medication side effects, risk of tolerance/dependence & alternative treatments discussed. ;No signs of potential drug abuse or diversion identified. ;Assessed functional status. Chronic Pain > 50 MEDD -   Chronic Pain > 80 MEDD -           Miguel received counseling on the following healthy behaviors: nutrition, exercise, medication adherence and weight loss, continue to abstain from smoking cigarettes and THC    Reviewed prior labs and health maintenance  Discussed use, benefit, and side effects of prescribed medications. Barriers to medication compliance addressed. Patient given educational materials - see patient instructions  Was a self-tracking handout given in paper form or via [employer]? Yes  All patient questions answered. Patient voiced understanding. The patient's past medical,surgical, social, and family history as well as his current medications and allergies were reviewed as documented in today's encounter. Medications, labs, diagnostic studies, consultations and follow-up as documented in this encounter. Return in about 3 months (around 10/20/2021) for ALWAYS NEEDS 30 MIN. APPT, CHF, COPD, DM2, HTN, HLP.     Please obtain reports from [facility]   Date Time Provider William Hernandez   7/29/2021  7:30 AM [address] [place] [address]   8/6/2021 10:40 AM Milana Farley improved and he does not use the oxygen all the time but only as needed. Advised to use it when getting anxiety attacks and see if it will help as I suspect shortness of breath contributing to his called anxiety attacks. He reports compliance with the CPAP at nighttime for his sleep apnea. He comes in his wheelchair, his daughter is pushing him today around. His wife also helping at home and the transportation when needed    BP controlled. Miguel reports compliance with BP medications, and tolerates them well, denies side effects. He says he did not see the nephrologist in a long time    BP Readings from Last 3 Encounters:   07/20/21 130/84   05/24/21 138/86   05/14/21 (!) 106/59        Pulse is Normal.    Pulse Readings from Last 3 Encounters:   07/20/21 74   05/24/21 79   05/14/21 89       Lab Results   Component Value Date    LVEF 46 03/26/2021    LVEFMODE Echo 08/29/2017     There is unintentional weight gain of 20 pounds in 3 months  Wt Readings from Last 3 Encounters:   07/20/21 (!) 427 lb (193.7 kg)   05/24/21 (!) 420 lb (190.5 kg)   05/03/21 (!) 417 lb (189.1 kg)      Wt (!) 407 lb (184.6 kg)   on 4/23/2021    THC  Quit 26 days ago, praise given  Pulse ox is normal now greatly improved from prior. SpO2 Readings from Last 5 Encounters:   07/20/21 98%   05/24/21 93%   05/03/21 94%   04/30/21 99%   04/23/21 94%         Diabetes Mellitus Type II, Follow-up:    Current symptoms/problems include hyperglycemia, hypoglycemia , paresthesia of the feet and visual disturbances. Symptoms have been present for several years.     Known diabetic complications: nephropathy, peripheral neuropathy, impotence and cardiovascular disease  Cardiovascular risk factors: advanced age (older than 54 for men, 72 for women), diabetes mellitus, dyslipidemia, hypertension, male gender, obesity (BMI >= 30 kg/m2), sedentary lifestyle, smoking/ tobacco exposure and chronic kidney disease stage III    Medication compliance: compliant all of the time  Current diabetic medications include intensive insulin injection program, monitored and adjustments by inpatient pharmacy. He is on Humulin R 500    Eye exam current (within one year): yes, glaucoma watch for ophthalmologist, his daughter says he needed a 6-month follow-up to see if he develops glaucoma  I requested the records  Current diet: in general, a \"healthy\" diet  , diabetic, low fat/ cholesterol, low salt    Barriers: impairment:  hearing received hearing aids, chronic back pain, difficulty walking, lack of motivation, but he says he has been doing better in this, and overwhelmed by complexity of regimen , recently with support from his family, he has been doing better, seeing the pharmacy all the time, following with specialists again    Current monitoring regimen: home blood tests - 5-6 times daily or even more [address] [place] [address] blood sugar records: trend: fluctuating a lot, this morning it was 95, last night 195  Max 250    Any episodes of hypoglycemia? yes -  Sometimes , he says 4 days ago the blood glucose dropped to 40, patient says he did call the pharmacy and they adjusted his insulin immediately. Patient reports about once a week or every other week he gets hypoglycemia and he is to call the pharmacist and they answer and guide him right away        Is He on ACE inhibitor or angiotensin II receptor blocker? Yes      reports that he quit smoking about 8 months ago. His smoking use included cigarettes. He started smoking about 36 years ago. He has a 8.25 pack-year smoking history. He quit smokeless tobacco use about 26 years ago. His smokeless tobacco use included snuff. Counseling given: Yes      Daily Aspirin? Yes      A1c is improved well controlled. Lab Results   Component Value Date    LABA1C 7.6 07/20/2021    LABA1C 8.0 04/23/2021    LABA1C 8.7 (H) 01/20/2021         Hyperlipidemia:  No new myalgias or GI upset on rosuvastatin (Crestor).  Medication compliance: compliant all of the time. Patient is  following a low fat, low cholesterol diet. LDL is Elevated. His goal is less than 70 LDL    Lab Results   Component Value Date    LDLCHOLESTEROL 82 09/25/2020    LDLDIRECT 72 03/13/2013           Patient has chronic back pain, midline, radiates to the sides and into the left leg, impairing his walking sometimes. He has history of 4 back surgeries. Intensity of pain is 8/10 today, worse with activities. It is also worse with prolonged sitting, prolonged standing up or with activities. He cannot bend over. He needs help from his family members to help him with his wraps on the legs and sometimes dressing. He comes in wheelchair today, walking with make his back pain worse, his daughter is helping him and pushing him around in the wheelchair. Patient reports he takes the pain medication as needed, helps him stay more active, denies side effects, risk of cardiorespiratory depression discussed again, he understands, he has Narcan      He comes in wheelchair today  He does have Mindy Man, Shower bench    Patient says he still has lower abdomen pain all over  Had colonoscopy and EGD, polyps were removed    Depression is improved  He is still frustrated that \" nothing is getting fixed\", however he feels better has been smiling more today and hearing me during the encounter        [facility] Scores 7/20/2021 4/23/2021 2/23/2021 1/27/2021 2/20/2020 11/19/2019 6/19/2019   PHQ2 Score 0 6 0 0 3 2 4   PHQ9 Score 0 13 0 0 17 2 14       Prior to Visit Medications    Medication Sig Taking? Authorizing Provider   oxyCODONE HCl (OXY-IR) 10 MG immediate release tablet Take 1 tablet by mouth every 8 hours as needed for Pain for up to 30 days.  Yes Tatiana Rocha MD   Insulin Syringe-Needle U-100 31G X 5/16\" 1 ML MISC Use to subcutaneously inject insulin three times daily Yes Tatiana Rocha MD   insulin regular human (HUMULIN R) 500 UNIT/ML concentrated injection vial Patient using 0.52ml with breakfast, 0.52ml with lunch and 0.45ml with dinner. Yes Belvin Cabot, MD   escitalopram (LEXAPRO) 10 MG tablet Take 1 tablet by mouth daily Yes Belvin Cabot, MD   Continuous Blood Gluc Sensor (FREESTYLE CRISTINE 14 DAY SENSOR) MISC Use one sensor every 14 days Yes Belvin Cabot, MD   rosuvastatin (CRESTOR) 10 MG tablet Take 1 tablet by mouth nightly Stop pravastatin Yes Belvin Cabot, MD   Continuous Blood Gluc Sensor (FREESTYLE CRISTINE 2 SENSOR) [facility] Patient to apply a new sensor every 14 days. RX to cover voucher patient will bring in. Yes Belvin Cabot, MD   ammonium lactate (LAC-HYDRIN) 12 % cream Apply topically to dry skin BID. Yes Velasquez Feldman DPM   sulfacetamide (BLEPH-10) 10 % ophthalmic solution  Yes Stewart Henriquez MD   mupirocin (BACTROBAN) 2 % ointment Apply topically 2 times daily on the affected area for 7-10 days. OK to substitute to cream Yes Belvin Cabot, MD   Gauze Pads & Dressings 4\"X4\" PADS Twice a day dressing of right leg wound Yes Belvin Cabot, MD   Gauze Bandages (ROLLED GAUZE BANDAGE 4\"X2. 5YD) MISC For twice a day dressing Yes Belvin Cabot, MD   PROAIR  (90 Base) MCG/ACT inhaler INHALE TWO PUFFS BY MOUTH EVERY 6 HOURS AS NEEDED FOR WHEEZING OR FOR SHORTNESS OF BREATH Yes Belvin Cabot, MD   amitriptyline (ELAVIL) 50 MG tablet Take 2 po qhs Yes Denise Cary MD   nystatin (MYCOSTATIN) 220727 UNIT/GM powder Apply 2 times daily in the skin folds for several months Yes Belvin Cabot, MD   tiZANidine (ZANAFLEX) 4 MG tablet TAKE ONE TABLET BY MOUTH EVERY 8 HOURS AS NEEDED FOR BACK PAIN Yes Belvin Cabot, MD   bumetanide (BUMEX) 1 MG tablet TAKE THREE TABLETS BY MOUTH TWICE A DAY Yes Belvin Cabot, MD   spironolactone (ALDACTONE) 50 MG tablet Take 1 tablet by mouth daily Yes Belvin Cabot, MD   vitamin D3 (CHOLECALCIFEROL) 25 MCG (1000 UT) TABS tablet Take 1 tablet by mouth daily Yes Paige MD Manny   ammonium lactate (LAC-HYDRIN) 12 % lotion Apply topically daily. For dry skin Yes Fito Maxwell MD   amitriptyline (ELAVIL) 25 MG tablet Take 1 po qhs x 2 days then 2 po qhs Yes Tony Russo MD   clobetasol (TEMOVATE) 0.05 % ointment Apply topically 2 times daily for psoriasis Yes Fito Maxwell MD   ketoconazole (NIZORAL) 2 % cream Apply twice a day for yeast infection in the skin folds, for 4 weeks Yes Fito Maxwell MD   albuterol (PROVENTIL) (2.5 MG/3ML) 0.083% nebulizer solution Take 3 mLs by nebulization every 6 hours as needed for Wheezing or Shortness of Breath Yes Fito Maxwell MD   pantoprazole (PROTONIX) 40 MG tablet Take 1 tablet by mouth every morning (before breakfast) Yes Fito Maxwell MD   Ferrous Sulfate (IRON) 325 (65 Fe) MG TABS Take 1 tablet by mouth daily Yes Fito Maxwell MD   metoprolol tartrate (LOPRESSOR) 50 MG tablet Take 1 tablet by mouth 2 times daily Yes Fito Maxwell MD   magnesium oxide (MAG-OX) 400 (240 Mg) MG tablet Take 1 tablet by mouth daily Yes Fito Maxwell MD   lisinopril (PRINIVIL;ZESTRIL) 5 MG tablet Take 1 tablet by mouth daily . Discontinued Lisinopril  10 mg Yes Fito Maxwell MD   fluticasone-salmeterol (ADVAIR HFA) 230-21 MCG/ACT inhaler Inhale 2 puffs into the lungs 2 times daily Yes Fito Maxwell MD   tiotropium (SPIRIVA RESPIMAT) 2.5 MCG/ACT AERS inhaler Inhale 2 puffs into the lungs daily Yes Fito Maxwell MD   venlafaxine (EFFEXOR XR) 75 MG extended release capsule Take 1 capsule by mouth daily Take with food Yes Fito Maxwell MD   naloxone 4 MG/0.1ML LIQD nasal spray 1 spray by Nasal route as needed for Opioid Reversal Due to COPD and sleep apnea, this is a big recommendation for you Yes Fito Maxwell MD   blood glucose monitor strips Test 3 times a day & as needed for symptoms of irregular blood glucose.  Dispense sufficient amount for indicated testing frequency plus Miriam Heath MD            Social History     Tobacco Use    Smoking status: Former Smoker     Packs/day: 0.25     Years: 33.00     Pack years: 8.25     Types: Cigarettes     Start date: 1985     Quit date: 2020     Years since quittin.7    Smokeless tobacco: Former User     Types: Snuff     Quit date: 1995   Vaping Use    Vaping Use: Never used   Substance Use Topics    Alcohol use: No     Alcohol/week: 0.0 standard drinks    Drug use: Not Currently     Types: Marijuana     Comment: Patient reports he quit using THC for 26 days on 2021         Review of Systems   Constitutional: Positive for fatigue and unexpected weight change. Negative for activity change, appetite change, chills, diaphoresis and fever. HENT: Positive for hearing loss (Received hearing aids). Eyes: Positive for visual disturbance ( glaucoma suspect). Respiratory: Positive for apnea (on CPAP) and shortness of breath. Negative for cough, chest tightness and wheezing. Cardiovascular: Positive for leg swelling (b/l). Negative for chest pain and palpitations. Gastrointestinal: Positive for abdominal pain. Negative for abdominal distention, constipation, diarrhea, nausea and vomiting. Endocrine: Negative for cold intolerance, heat intolerance, polydipsia, polyphagia and polyuria. Musculoskeletal: Positive for arthralgias, back pain and gait problem. Comes in wheelchair today, has walker and cane at home   Skin: Positive for rash (b/l legs, but no wounds). Allergic/Immunologic: Positive for immunocompromised state (Due to diabetes). Neurological: Positive for numbness (feet, also burning in his feet since ). Hematological: Does not bruise/bleed easily. Psychiatric/Behavioral: Negative for dysphoric mood and sleep disturbance.  The patient is nervous/anxious.          -vital signs stable and within normal limits except morbid obesity per BMI  /84   Pulse 74   Temp 97.8 °F (36.6 °C)   Ht 6' (1.829 m)   Wt (!) 427 lb (193.7 kg)   SpO2 98%   BMI 57.91 kg/m²         Physical Exam  Vitals and nursing note reviewed. Constitutional:       General: He is not in acute distress. Appearance: Normal appearance. He is well-developed. He is obese. He is not diaphoretic. HENT:      Head: Normocephalic and atraumatic. Right Ear: External ear normal.      Left Ear: External ear normal.      Ears:      Comments: Has hearing aids     Mouth/Throat:      Comments: I did not examine the mouth due to coronavirus pandemic and wearing masks. Eyes:      General: Lids are normal. No scleral icterus. Right eye: No discharge. Left eye: No discharge. Extraocular Movements: Extraocular movements intact. Conjunctiva/sclera: Conjunctivae normal.   Neck:      Thyroid: No thyromegaly. Cardiovascular:      Rate and Rhythm: Normal rate and regular rhythm. Heart sounds: Heart sounds are distant. No murmur heard. Comments: Wearing wraps bilaterally tight, I did not remove the wraps  Pulmonary:      Effort: Pulmonary effort is normal. No respiratory distress. Breath sounds: Examination of the right-lower field reveals decreased breath sounds. Examination of the left-lower field reveals decreased breath sounds. Decreased breath sounds present. No wheezing or rales. Comments: Improved breath sounds and air entry bilaterally  Chest:      Chest wall: No tenderness. Abdominal:      General: Bowel sounds are normal. There is no distension. Palpations: Abdomen is soft. There is no hepatomegaly or splenomegaly. Tenderness: There is no abdominal tenderness. There is no right CVA tenderness, left CVA tenderness, guarding or rebound. Comments: Very obese abdomen, mildly tender subumbilical diffuse could be related to water retention from CHF or doing his insulin injections   Musculoskeletal:         General: No tenderness. Normal range of motion. Cervical back: Normal range of motion and neck supple. Right lower leg: Pitting Edema present. Left lower leg: Pitting Edema present. Lymphadenopathy:      Cervical: No cervical adenopathy. Skin:     General: Skin is warm and dry. Capillary Refill: Capillary refill takes less than 2 seconds. Findings: No rash. Neurological:      Mental Status: He is alert and oriented to person, place, and time. Gait: Gait abnormal.      Deep Tendon Reflexes: Reflexes are normal and symmetric. Comments: Decreased sensation bilateral feet. Up and go test more than 12 seconds. High risk for falls. Comes in wheelchair   Psychiatric:         Mood and Affect: Mood normal.         Behavior: Behavior normal.         Thought Content: Thought content normal.         Judgment: Judgment normal.           I personally reviewed testing with patient.   Anemia is persistent moderate  He did have EGD colonoscopy not explaining why he has anemia, polyps removed, taking iron supplementation  Chronic kidney disease stage III slightly improved  Hyperglycemia well controlled  Hyperlipidemia  Otherwise labs within normal limits    Hospital Outpatient Visit on 06/24/2021   Component Date Value Ref Range Status    WBC 06/24/2021 9.2  3.5 - 11.0 k/uL Final    RBC 06/24/2021 3.61* 4.5 - 5.9 m/uL Final    Hemoglobin 06/24/2021 10.3* 13.5 - 17.5 g/dL Final    Hematocrit 06/24/2021 31.4* 41 - 53 % Final    MCV 06/24/2021 86.9  80 - 100 fL Final    MCH 06/24/2021 28.6  26 - 34 pg Final    MCHC 06/24/2021 32.9  31 - 37 g/dL Final    RDW 06/24/2021 16.0* 11.5 - 14.9 % Final    Platelets 31/89/6254 181  150 - 450 k/uL Final    MPV 06/24/2021 9.2  6.0 - 12.0 fL Final    NRBC Automated 06/24/2021 NOT REPORTED  per 100 WBC Final    Differential Type 06/24/2021 NOT REPORTED   Final    Seg Neutrophils 06/24/2021 78* 36 - 66 % Final    Lymphocytes 06/24/2021 12* 24 - 44 % Final    Monocytes 06/24/2021 6  1 - 7 % Final  Eosinophils % 06/24/2021 3  0 - 4 % Final    Basophils 06/24/2021 1  0 - 2 % Final    Immature Granulocytes 06/24/2021 NOT REPORTED  0 % Final    Segs Absolute 06/24/2021 7.20  1.3 - 9.1 k/uL Final    Absolute Lymph # 06/24/2021 1.10  1.0 - 4.8 k/uL Final    Absolute Mono # 06/24/2021 0.60  0.1 - 1.3 k/uL Final    Absolute Eos # 06/24/2021 0.30  0.0 - 0.4 k/uL Final    Basophils Absolute 06/24/2021 0.00  0.0 - 0.2 k/uL Final    Absolute Immature Granulocyte 06/24/2021 NOT REPORTED  0.00 - 0.30 k/uL Final    WBC Morphology 06/24/2021 NOT REPORTED   Final    RBC Morphology 06/24/2021 NOT REPORTED   Final    Platelet Estimate 99/57/6465 NOT REPORTED   Final    Glucose 06/24/2021 127* 70 - 99 mg/dL Final    BUN 06/24/2021 39* 6 - 20 mg/dL Final    CREATININE 06/24/2021 1.70* 0.70 - 1.20 mg/dL Final    Bun/Cre Ratio 06/24/2021 NOT REPORTED  9 - 20 Final    Calcium 06/24/2021 8.7  8.6 - 10.4 mg/dL Final    Sodium 06/24/2021 139  135 - 144 mmol/L Final    Potassium 06/24/2021 4.2  3.7 - 5.3 mmol/L Final    Chloride 06/24/2021 100  98 - 107 mmol/L Final    CO2 06/24/2021 27  20 - 31 mmol/L Final    Anion Gap 06/24/2021 12  9 - 17 mmol/L Final    Alkaline Phosphatase 06/24/2021 85  40 - 129 U/L Final    ALT 06/24/2021 10  5 - 41 U/L Final    AST 06/24/2021 10  <40 U/L Final    Total Bilirubin 06/24/2021 0.34  0.3 - 1.2 mg/dL Final    Total Protein 06/24/2021 6.8  6.4 - 8.3 g/dL Final    Albumin 06/24/2021 3.7  3.5 - 5.2 g/dL Final    Albumin/Globulin Ratio 06/24/2021 NOT REPORTED  1.0 - 2.5 Final    GFR Non- 06/24/2021 42* >60 mL/min Final    GFR  06/24/2021 51* >60 mL/min Final    GFR Comment 06/24/2021        Final    Comment: Average GFR for 52-63 years old:   80 mL/min/1.73sq m  Chronic Kidney Disease:   <60 mL/min/1.73sq m  Kidney failure:   <15 mL/min/1.73sq m              eGFR calculated using average adult body mass.  Additional eGFR calculator Status:   Future     Standing Expiration Date:   9/11/2021    Lipid Panel     Standing Status:   Future     Standing Expiration Date:   9/11/2021     Order Specific Question:   Is Patient Fasting?/# of Hours     Answer:   8-10 Hours, water ok to drink    Phosphorus     Standing Status:   Future     Standing Expiration Date:   9/11/2021    Vitamin B12 & Folate     Standing Status:   Future     Standing Expiration Date:   9/11/2021    Vitamin D 25 Hydroxy     Standing Status:   Future     Standing Expiration Date:   9/11/2021    Magnesium     Standing Status:   Future     Standing Expiration Date:   9/11/2021    Brain Natriuretic Peptide     Standing Status:   Future     Standing Expiration Date:   9/11/2021    Protein Electrophoresis, Urine     Standing Status:   Future     Standing Expiration Date:   9/11/2021    Electrophoresis Protein, Serum without Reflex to Immunofixation     Standing Status:   Future     Standing Expiration Date:   9/11/2021    Iron and TIBC     Standing Status:   Future     Standing Expiration Date:   9/11/2021     Order Specific Question:   Is Patient Fasting? Answer:   yes     Order Specific Question:   No of Hours? Answer:   8    Ferritin     Standing Status:   Future     Standing Expiration Date:   9/11/2021    Path Review, Smear     Standing Status:   Future     Standing Expiration Date:   9/11/2021    Urinalysis Reflex to Culture     Standing Status:   Future     Standing Expiration Date:   7/20/2022     Order Specific Question:   SPECIFY(EX-CATH,MIDSTREAM,CYSTO,ETC)?      Answer:   midstream    PSA screening     Standing Status:   Future     Standing Expiration Date:   7/21/2022    [facility]) - Maninder Conti MD, Nephrology, [place]     Referral Priority:   Routine     Referral Type:   Eval and Treat     Referral Reason:   Specialty Services Required     Referred to Provider:   Ирина Linder MD     Requested Specialty:   Nephrology     Number of Visits

## 2021-07-24 ENCOUNTER — HOSPITAL ENCOUNTER (OUTPATIENT)
Age: 56
Discharge: HOME | End: 2021-07-24
Payer: COMMERCIAL

## 2021-07-24 DIAGNOSIS — I13.0 BENIGN HYPERTENSIVE HEART AND CKD, STAGE 3 (GFR 30-59), W CHF (HCC): ICD-10-CM

## 2021-07-24 DIAGNOSIS — E78.5 HYPERLIPIDEMIA WITH TARGET LDL LESS THAN 70: ICD-10-CM

## 2021-07-24 DIAGNOSIS — Z79.4 TYPE 2 DIABETES MELLITUS WITH DIABETIC POLYNEUROPATHY, WITH LONG-TERM CURRENT USE OF INSULIN (HCC): ICD-10-CM

## 2021-07-24 DIAGNOSIS — E11.42 TYPE 2 DIABETES MELLITUS WITH DIABETIC POLYNEUROPATHY, WITH LONG-TERM CURRENT USE OF INSULIN (HCC): ICD-10-CM

## 2021-07-24 DIAGNOSIS — D64.9 ANEMIA, UNSPECIFIED TYPE: ICD-10-CM

## 2021-07-24 DIAGNOSIS — I50.32 CHRONIC DIASTOLIC CONGESTIVE HEART FAILURE (HCC): ICD-10-CM

## 2021-07-24 DIAGNOSIS — N18.30 BENIGN HYPERTENSIVE HEART AND CKD, STAGE 3 (GFR 30-59), W CHF (HCC): ICD-10-CM

## 2021-07-24 DIAGNOSIS — Z12.5 PROSTATE CANCER SCREENING: ICD-10-CM

## 2021-07-24 LAB
ABSOLUTE EOS #: 0.3 K/UL (ref 0–0.4)
ABSOLUTE IMMATURE GRANULOCYTE: ABNORMAL K/UL (ref 0–0.3)
ABSOLUTE LYMPH #: 1.1 K/UL (ref 1–4.8)
ABSOLUTE MONO #: 0.6 K/UL (ref 0.1–1.3)
ALBUMIN SERPL-MCNC: 4.1 G/DL (ref 3.5–5.2)
ALBUMIN/GLOBULIN RATIO: ABNORMAL (ref 1–2.5)
ALP BLD-CCNC: 79 U/L (ref 40–129)
ALT SERPL-CCNC: 11 U/L (ref 5–41)
ANION GAP SERPL CALCULATED.3IONS-SCNC: 12 MMOL/L (ref 9–17)
AST SERPL-CCNC: 12 U/L
BASOPHILS # BLD: 1 % (ref 0–2)
BASOPHILS ABSOLUTE: 0.1 K/UL (ref 0–0.2)
BILIRUB SERPL-MCNC: 0.38 MG/DL (ref 0.3–1.2)
BILIRUBIN URINE: NEGATIVE
BNP INTERPRETATION: NORMAL
BUN BLDV-MCNC: 33 MG/DL (ref 6–20)
BUN/CREAT BLD: ABNORMAL (ref 9–20)
CALCIUM SERPL-MCNC: 9.3 MG/DL (ref 8.6–10.4)
CHLORIDE BLD-SCNC: 101 MMOL/L (ref 98–107)
CHOLESTEROL/HDL RATIO: 3.8
CHOLESTEROL: 121 MG/DL
CO2: 26 MMOL/L (ref 20–31)
COLOR: YELLOW
COMMENT UA: NORMAL
CREAT SERPL-MCNC: 1.67 MG/DL (ref 0.7–1.2)
DIFFERENTIAL TYPE: ABNORMAL
EOSINOPHILS RELATIVE PERCENT: 3 % (ref 0–4)
FERRITIN: 173 UG/L (ref 30–400)
FOLATE: 14.9 NG/ML
GFR AFRICAN AMERICAN: 52 ML/MIN
GFR NON-AFRICAN AMERICAN: 43 ML/MIN
GFR SERPL CREATININE-BSD FRML MDRD: ABNORMAL ML/MIN/{1.73_M2}
GFR SERPL CREATININE-BSD FRML MDRD: ABNORMAL ML/MIN/{1.73_M2}
GLUCOSE BLD-MCNC: 193 MG/DL (ref 70–99)
GLUCOSE URINE: NEGATIVE
HCT VFR BLD CALC: 32.5 % (ref 41–53)
HDLC SERPL-MCNC: 32 MG/DL
HEMOGLOBIN: 10.5 G/DL (ref 13.5–17.5)
IMMATURE GRANULOCYTES: ABNORMAL %
IRON SATURATION: 16 % (ref 20–55)
IRON: 48 UG/DL (ref 59–158)
KETONES, URINE: NEGATIVE
LDL CHOLESTEROL: 46 MG/DL (ref 0–130)
LEUKOCYTE ESTERASE, URINE: NEGATIVE
LYMPHOCYTES # BLD: 12 % (ref 24–44)
MAGNESIUM: 1.7 MG/DL (ref 1.6–2.6)
MCH RBC QN AUTO: 27.5 PG (ref 26–34)
MCHC RBC AUTO-ENTMCNC: 32.4 G/DL (ref 31–37)
MCV RBC AUTO: 85.1 FL (ref 80–100)
MONOCYTES # BLD: 7 % (ref 1–7)
NITRITE, URINE: NEGATIVE
NRBC AUTOMATED: ABNORMAL PER 100 WBC
PATHOLOGIST REVIEW: NORMAL
PDW BLD-RTO: 16.2 % (ref 11.5–14.9)
PH UA: 6 (ref 5–8)
PHOSPHORUS: 4.2 MG/DL (ref 2.5–4.5)
PLATELET # BLD: 196 K/UL (ref 150–450)
PLATELET ESTIMATE: ABNORMAL
PMV BLD AUTO: 8.7 FL (ref 6–12)
POTASSIUM SERPL-SCNC: 4.4 MMOL/L (ref 3.7–5.3)
PRO-BNP: 125 PG/ML
PROSTATE SPECIFIC ANTIGEN: 0.69 UG/L
PROTEIN UA: NEGATIVE
RBC # BLD: 3.82 M/UL (ref 4.5–5.9)
RBC # BLD: ABNORMAL 10*6/UL
SEG NEUTROPHILS: 77 % (ref 36–66)
SEGMENTED NEUTROPHILS ABSOLUTE COUNT: 7.1 K/UL (ref 1.3–9.1)
SODIUM BLD-SCNC: 139 MMOL/L (ref 135–144)
SPECIFIC GRAVITY UA: 1.01 (ref 1–1.03)
TOTAL IRON BINDING CAPACITY: 300 UG/DL (ref 250–450)
TOTAL PROTEIN: 7.4 G/DL (ref 6.4–8.3)
TRIGL SERPL-MCNC: 213 MG/DL
TURBIDITY: CLEAR
UNSATURATED IRON BINDING CAPACITY: 252 UG/DL (ref 112–347)
URINE HGB: NEGATIVE
UROBILINOGEN, URINE: NORMAL
VITAMIN B-12: 1096 PG/ML (ref 232–1245)
VITAMIN D 25-HYDROXY: 38.9 NG/ML (ref 30–100)
VLDLC SERPL CALC-MCNC: ABNORMAL MG/DL (ref 1–30)
WBC # BLD: 9.2 K/UL (ref 3.5–11)
WBC # BLD: ABNORMAL 10*3/UL

## 2021-07-24 PROCEDURE — 83540 ASSAY OF IRON: CPT

## 2021-07-24 PROCEDURE — 82306 VITAMIN D 25 HYDROXY: CPT

## 2021-07-24 PROCEDURE — 84165 PROTEIN E-PHORESIS SERUM: CPT

## 2021-07-24 PROCEDURE — 84166 PROTEIN E-PHORESIS/URINE/CSF: CPT

## 2021-07-24 PROCEDURE — 84156 ASSAY OF PROTEIN URINE: CPT

## 2021-07-24 PROCEDURE — 82728 ASSAY OF FERRITIN: CPT

## 2021-07-24 PROCEDURE — 83735 ASSAY OF MAGNESIUM: CPT

## 2021-07-24 PROCEDURE — 81003 URINALYSIS AUTO W/O SCOPE: CPT

## 2021-07-24 PROCEDURE — 82746 ASSAY OF FOLIC ACID SERUM: CPT

## 2021-07-24 PROCEDURE — 85025 COMPLETE CBC W/AUTO DIFF WBC: CPT

## 2021-07-24 PROCEDURE — 83880 ASSAY OF NATRIURETIC PEPTIDE: CPT

## 2021-07-24 PROCEDURE — 36415 COLL VENOUS BLD VENIPUNCTURE: CPT

## 2021-07-24 PROCEDURE — 83550 IRON BINDING TEST: CPT

## 2021-07-24 PROCEDURE — 82607 VITAMIN B-12: CPT

## 2021-07-24 PROCEDURE — 84100 ASSAY OF PHOSPHORUS: CPT

## 2021-07-24 PROCEDURE — 84155 ASSAY OF PROTEIN SERUM: CPT

## 2021-07-24 PROCEDURE — 80053 COMPREHEN METABOLIC PANEL: CPT

## 2021-07-24 PROCEDURE — G0103 PSA SCREENING: HCPCS

## 2021-07-24 PROCEDURE — 80061 LIPID PANEL: CPT

## 2021-07-26 ENCOUNTER — TELEPHONE (OUTPATIENT)
Dept: FAMILY MEDICINE CLINIC | Age: 56
End: 2021-07-26

## 2021-07-26 ENCOUNTER — TELEPHONE (OUTPATIENT)
Dept: PHARMACY | Age: 56
End: 2021-07-26

## 2021-07-26 ENCOUNTER — PATIENT MESSAGE (OUTPATIENT)
Dept: FAMILY MEDICINE CLINIC | Age: 56
End: 2021-07-26

## 2021-07-26 DIAGNOSIS — D64.9 ANEMIA, UNSPECIFIED TYPE: Primary | ICD-10-CM

## 2021-07-26 DIAGNOSIS — N43.3 HYDROCELE, BILATERAL: Primary | ICD-10-CM

## 2021-07-26 LAB
ALBUMIN (CALCULATED): 4 G/DL (ref 3.2–5.2)
ALBUMIN PERCENT: 59 % (ref 45–65)
ALPHA 1 PERCENT: 3 % (ref 3–6)
ALPHA 2 PERCENT: 12 % (ref 6–13)
ALPHA-1-GLOBULIN: 0.2 G/DL (ref 0.1–0.4)
ALPHA-2-GLOBULIN: 0.8 G/DL (ref 0.5–0.9)
BETA GLOBULIN: 0.8 G/DL (ref 0.5–1.1)
BETA PERCENT: 12 % (ref 11–19)
GAMMA GLOBULIN %: 15 % (ref 9–20)
GAMMA GLOBULIN: 1.1 G/DL (ref 0.5–1.5)
PATHOLOGIST: NORMAL
PROTEIN ELECTROPHORESIS, SERUM: NORMAL
TOTAL PROT. SUM,%: 101 % (ref 98–102)
TOTAL PROT. SUM: 6.9 G/DL (ref 6.3–8.2)
TOTAL PROTEIN: 6.9 G/DL (ref 6.4–8.3)

## 2021-07-26 NOTE — RESULT ENCOUNTER NOTE
ABNORMAL. Please notify patient.   IRON IS LOW  Blood glucose one 193Chronic kidney disease stage III stable  Mild increased triglycerides  Magnesium is slightly lower  Anemia same as before I would like him to see hematologist oncologist and I will place a referral for his anemia which is not improving, please give contact information for the referral for anemia    Future Appointments  7/29/2021  7:30 AM    [facility] MEDICATION MGMT       ST MED MGMT        [facility]  8/13/2021  10:40 AM   Theresa Russ MD [facility]  10/21/2021 9:00 AM    Terri Olivia MD     [facility]

## 2021-07-26 NOTE — TELEPHONE ENCOUNTER
----- Message from Radha Marte sent at 7/26/2021  3:18 PM EDT -----  Subject: Message to Provider    QUESTIONS  Information for Provider? [facility]   [address] [facility], [facility], [address] [facility] [phone] Patient   stated phone number is out of service and needs another number   ---------------------------------------------------------------------------  --------------  CALL BACK INFO  What is the best way for the office to contact you? OK to leave message on   voicemail  Preferred Call Back Phone Number? [phone]  ---------------------------------------------------------------------------  --------------  SCRIPT ANSWERS  Relationship to Patient?  Self

## 2021-07-26 NOTE — TELEPHONE ENCOUNTER
[facility] this morning to report several hypoglycemic episodes over the weekend. Patient states Saturday he ate dinner around 5pm and took his U-500 insulin 0.45 mL with dinner. Went out and returned about 10pm due to feeling funny. Blood sugar was 44. Ate candy, drank orange juice and possibly cupcake(doesnt remember if this is when he had the cupcake or not). Blood sugar came up to 177 and went to bed. Woke up about 3 hours later early Sunday morning and blood sugar 51. Reports he got blood sugar up again to 200s. Blood sugar fell again to 39 ate candy again and blood sugar came up, but not sure of the reading. Patient does wear a [employer], but not connected by Bluetooth to clinic to see exact readings. Discussed with patient treating hypoglycemia with fast sugar, but then eating something with protein. Patient also admits he does not have the most routine diet and sometimes skips meals. This time hypoglycemia is not due to skipping meals though. Patient's blood sugar this morning was 105 about an hour ago when he ate his breakfast of eggs, sausage, toast. Took his U-500 insulin 0.52 mL with breakfast. Patient states he normally eats lunch later around 3pm, but isn't sure he is going to eat lunch today. Discussed not skipping lunch and to try something small with some protein. Patient understands not to take insulin if he is going to skip lunch/eat a small snack. Due to patient's low blood sugars throughout the night Saturday night into Sunday morning, I will be conservative and have him lower his evening dose of insulin tonight to 0.4mL as patient has already taken morning insulin. Patient is call with any blood sugars less than 70 or if blood sugar above 200 with insulin dosage change. Will call and check on patient tomorrow. He does have an appointment for this week and reminded patient to bring in [employer] alexys to appointment for download.      Heather Dahl

## 2021-07-27 ENCOUNTER — TELEPHONE (OUTPATIENT)
Dept: PHARMACY | Age: 56
End: 2021-07-27

## 2021-07-27 LAB
P E INTERPRETATION, U: NORMAL
PATHOLOGIST: NORMAL
SPECIMEN TYPE: NORMAL
URINE TOTAL PROTEIN: 5 MG/DL

## 2021-07-27 NOTE — RESULT ENCOUNTER NOTE
Fozia comment sent to patient.   Normal electrophoresis, that means no multiple myeloma  Future Appointments  7/29/2021  7:30 AM    [facility] MEDICATION MGMT       ST MED MGMT        [facility]  8/13/2021  10:40 AM   Noman Garcia MD [facility]  9/7/2021   9:50 AM    Marylen Platts, MD        . C [facility]           [facility]  10/21/2021 9:00 AM    Jer[facility] Zambrano MD     [facility]

## 2021-07-27 NOTE — TELEPHONE ENCOUNTER
Spoke with patient this afternoon. This morning blood sugar 88. No further hypoglycemia events. Patient did use the U-500 insulin 0.4 mL last night. Right now blood sugar 162. Patient will continue to monitor blood sugar closely. Will call the clinic if blood sugar <80.  Will keep appointment for 7/29  Sharri Larsen, Pharm D, [facility]  7/27/2021 4:08 PM

## 2021-07-29 ENCOUNTER — HOSPITAL ENCOUNTER (OUTPATIENT)
Dept: PHARMACY | Age: 56
Setting detail: THERAPIES SERIES
Discharge: HOME | End: 2021-07-29
Payer: COMMERCIAL

## 2021-07-29 ENCOUNTER — APPOINTMENT (OUTPATIENT)
Dept: GENERAL RADIOLOGY | Age: 56
End: 2021-07-29
Payer: COMMERCIAL

## 2021-07-29 ENCOUNTER — HOSPITAL ENCOUNTER (EMERGENCY)
Age: 56
Discharge: HOME | End: 2021-07-29
Attending: STUDENT IN AN ORGANIZED HEALTH CARE EDUCATION/TRAINING PROGRAM
Payer: COMMERCIAL

## 2021-07-29 VITALS
TEMPERATURE: 97 F | RESPIRATION RATE: 24 BRPM | BODY MASS INDEX: 42.66 KG/M2 | HEART RATE: 106 BPM | HEIGHT: 72 IN | OXYGEN SATURATION: 96 % | SYSTOLIC BLOOD PRESSURE: 135 MMHG | WEIGHT: 315 LBS | DIASTOLIC BLOOD PRESSURE: 58 MMHG

## 2021-07-29 DIAGNOSIS — L97.301 VENOUS STASIS ULCER OF ANKLE LIMITED TO BREAKDOWN OF SKIN WITHOUT VARICOSE VEINS, UNSPECIFIED LATERALITY (HCC): Primary | ICD-10-CM

## 2021-07-29 DIAGNOSIS — I87.2 VENOUS STASIS ULCER OF ANKLE LIMITED TO BREAKDOWN OF SKIN WITHOUT VARICOSE VEINS, UNSPECIFIED LATERALITY (HCC): Primary | ICD-10-CM

## 2021-07-29 LAB
ABSOLUTE EOS #: 0.3 K/UL (ref 0–0.4)
ABSOLUTE IMMATURE GRANULOCYTE: ABNORMAL K/UL (ref 0–0.3)
ABSOLUTE LYMPH #: 0.9 K/UL (ref 1–4.8)
ABSOLUTE MONO #: 0.6 K/UL (ref 0.1–1.3)
ANION GAP SERPL CALCULATED.3IONS-SCNC: 12 MMOL/L (ref 9–17)
BASOPHILS # BLD: 1 % (ref 0–2)
BASOPHILS ABSOLUTE: 0.1 K/UL (ref 0–0.2)
BUN BLDV-MCNC: 35 MG/DL (ref 6–20)
BUN/CREAT BLD: ABNORMAL (ref 9–20)
CALCIUM SERPL-MCNC: 9.5 MG/DL (ref 8.6–10.4)
CHLORIDE BLD-SCNC: 105 MMOL/L (ref 98–107)
CO2: 24 MMOL/L (ref 20–31)
CREAT SERPL-MCNC: 1.63 MG/DL (ref 0.7–1.2)
DIFFERENTIAL TYPE: ABNORMAL
EOSINOPHILS RELATIVE PERCENT: 3 % (ref 0–4)
GFR AFRICAN AMERICAN: 53 ML/MIN
GFR NON-AFRICAN AMERICAN: 44 ML/MIN
GFR SERPL CREATININE-BSD FRML MDRD: ABNORMAL ML/MIN/{1.73_M2}
GFR SERPL CREATININE-BSD FRML MDRD: ABNORMAL ML/MIN/{1.73_M2}
GLUCOSE BLD-MCNC: 120 MG/DL (ref 70–99)
HCT VFR BLD CALC: 30.5 % (ref 41–53)
HEMOGLOBIN: 9.8 G/DL (ref 13.5–17.5)
IMMATURE GRANULOCYTES: ABNORMAL %
LYMPHOCYTES # BLD: 11 % (ref 24–44)
MCH RBC QN AUTO: 27.8 PG (ref 26–34)
MCHC RBC AUTO-ENTMCNC: 32.2 G/DL (ref 31–37)
MCV RBC AUTO: 86.4 FL (ref 80–100)
MONOCYTES # BLD: 7 % (ref 1–7)
NRBC AUTOMATED: ABNORMAL PER 100 WBC
PDW BLD-RTO: 15.5 % (ref 11.5–14.9)
PLATELET # BLD: 214 K/UL (ref 150–450)
PLATELET ESTIMATE: ABNORMAL
PMV BLD AUTO: 8.4 FL (ref 6–12)
POTASSIUM SERPL-SCNC: 4.1 MMOL/L (ref 3.7–5.3)
RBC # BLD: 3.53 M/UL (ref 4.5–5.9)
RBC # BLD: ABNORMAL 10*6/UL
SEG NEUTROPHILS: 78 % (ref 36–66)
SEGMENTED NEUTROPHILS ABSOLUTE COUNT: 6.2 K/UL (ref 1.3–9.1)
SODIUM BLD-SCNC: 141 MMOL/L (ref 135–144)
WBC # BLD: 8 K/UL (ref 3.5–11)
WBC # BLD: ABNORMAL 10*3/UL

## 2021-07-29 PROCEDURE — 85025 COMPLETE CBC W/AUTO DIFF WBC: CPT

## 2021-07-29 PROCEDURE — 99212 OFFICE O/P EST SF 10 MIN: CPT

## 2021-07-29 PROCEDURE — 80048 BASIC METABOLIC PNL TOTAL CA: CPT

## 2021-07-29 PROCEDURE — 99285 EMERGENCY DEPT VISIT HI MDM: CPT

## 2021-07-29 PROCEDURE — 6370000000 HC RX 637 (ALT 250 FOR IP): Performed by: EMERGENCY MEDICINE

## 2021-07-29 PROCEDURE — 73590 X-RAY EXAM OF LOWER LEG: CPT

## 2021-07-29 PROCEDURE — 36415 COLL VENOUS BLD VENIPUNCTURE: CPT

## 2021-07-29 RX ORDER — DOXYCYCLINE HYCLATE 100 MG
100 TABLET ORAL 2 TIMES DAILY
Qty: 14 TABLET | Refills: 0 | Status: SHIPPED | OUTPATIENT
Start: 2021-07-29 | End: 2021-08-05

## 2021-07-29 RX ORDER — CEPHALEXIN 500 MG/1
500 CAPSULE ORAL 4 TIMES DAILY
Qty: 28 CAPSULE | Refills: 0 | Status: SHIPPED | OUTPATIENT
Start: 2021-07-29 | End: 2021-08-05

## 2021-07-29 RX ORDER — CEPHALEXIN 250 MG/1
500 CAPSULE ORAL ONCE
Status: COMPLETED | OUTPATIENT
Start: 2021-07-29 | End: 2021-07-29

## 2021-07-29 RX ORDER — DOXYCYCLINE 100 MG/1
100 CAPSULE ORAL ONCE
Status: COMPLETED | OUTPATIENT
Start: 2021-07-29 | End: 2021-07-29

## 2021-07-29 RX ADMIN — DOXYCYCLINE 100 MG: 100 CAPSULE ORAL at 22:21

## 2021-07-29 RX ADMIN — CEPHALEXIN 500 MG: 250 CAPSULE ORAL at 22:21

## 2021-07-29 ASSESSMENT — PAIN DESCRIPTION - LOCATION: LOCATION: LEG

## 2021-07-29 ASSESSMENT — PAIN DESCRIPTION - DESCRIPTORS: DESCRIPTORS: DISCOMFORT

## 2021-07-29 ASSESSMENT — PAIN DESCRIPTION - FREQUENCY: FREQUENCY: CONTINUOUS

## 2021-07-29 ASSESSMENT — PAIN SCALES - GENERAL: PAINLEVEL_OUTOF10: 8

## 2021-07-29 ASSESSMENT — PAIN DESCRIPTION - ORIENTATION: ORIENTATION: LEFT;RIGHT

## 2021-07-29 ASSESSMENT — PAIN DESCRIPTION - PAIN TYPE: TYPE: CHRONIC PAIN

## 2021-07-29 NOTE — ED PROVIDER NOTES
injury, Hearing loss in right ear, Hepatic steatosis, History of general anesthesia complication, History of rib fracture, Hyperlipidemia, Hypersomnia, Hypertension, Insomnia, Intolerance of continuous positive airway pressure (CPAP) ventilation, Iron deficiency, Localized rash, Magnesium deficiency, Mastoiditis of left side, Mixed conductive and sensorineural hearing loss of both ears, Mixed type COPD (chronic obstructive pulmonary disease) (HCC), Moderate recurrent major depression ([address] Morbid obesity with BMI of 45.0-49.9, adult ([address] On home oxygen therapy, Open wound of groin, BARBY on CPAP, Osteoarthritis, Otitis externa of left ear, Pancreatitis chronic, Persistent depressive disorder, Renal insufficiency, Severe depression ([address] Spinal stenosis of lumbar region without neurogenic claudication, Syncope, Tinnitus of both ears, Type 2 diabetes mellitus with stage 3 chronic kidney disease, with long-term current use of insulin ([address] Type II or unspecified type diabetes mellitus without mention of complication, not stated as uncontrolled, Vitamin D deficiency, and Wears glasses. No additional pertinent     has a past surgical history that includes tympanomastoidectomy (Bilateral, 09/20/2012); Coronary angioplasty with stent (March 2013); Hand tendon surgery (Left); Knee arthroscopy (Left); Nerve Block (07-31-15); Cardiac catheterization (04/23/2018); pr esophagogastroduodenoscopy transoral diagnostic (N/A, 7/18/2018); Intracapsular cataract extraction (Right, 11/5/2019); Intracapsular cataract extraction (Left, 1/7/2020); back surgery ( (x 4) 2000,.12/2011.2/2012); Colonoscopy (11/3/2015); Colonoscopy (2013); and Colonoscopy (N/A, 4/12/2021).   No additional pertinent    Social History     Socioeconomic History    Marital status:      Spouse name: Not on file    Number of children: Not on file    Years of education: Not on file    Highest education level: Not on file   Occupational History    Occupation: disability     Comment: unemployed   Tobacco Use    Smoking status: Former Smoker     Packs/day: 0.25     Years: 33.00     Pack years: 8.25     Types: Cigarettes     Start date: 1985     Quit date: 2020     Years since quittin.7    Smokeless tobacco: Former User     Types: Snuff     Quit date: 1995   Vaping Use    Vaping Use: Never used   Substance and Sexual Activity    Alcohol use: No     Alcohol/week: 0.0 standard drinks    Drug use: Not Currently     Types: Marijuana     Comment: Patient reports he quit using THC for 26 days on 2021    Sexual activity: Not Currently   Other Topics Concern    Not on file   Social History Narrative    Middle of possible separation 2016          Social Determinants of Health     Financial Resource Strain: Low Risk     Difficulty of Paying Living Expenses: Not hard at all   Food Insecurity: No Food Insecurity    Worried About [employer] in the Last Year: Never true    Basil of Food in the Last Year: Never true   Transportation Needs: No Transportation Needs    Lack of Transportation (Medical): No    Lack of Transportation (Non-Medical):  No   Physical Activity:     Days of Exercise per Week:     Minutes of Exercise per Session:    Stress:     Feeling of Stress :    Social Connections:     Frequency of Communication with Friends and Family:     Frequency of Social Gatherings with Friends and Family:     Attends [employer] Services:     Active Member of Clubs or Organizations:     Attends Club or Organization Meetings:     Marital Status:    Intimate Partner Violence:     Fear of Current or Ex-Partner:     Emotionally Abused:     Physically Abused:     Sexually Abused:        Family History   Problem Relation Age of Onset    Heart Disease Mother          age 64 from MI    High Blood Pressure Mother     Diabetes Mother     High Blood Pressure Father          age 80 from CKD and Lung Fibrosis  Kidney Disease Father     Heart Disease Sister     Heart Attack Sister     Obesity Sister     Diabetes Sister        Allergies:  Levofloxacin, Lorazepam, Nsaids, Prozac [fluoxetine hcl], and Vancomycin    Home Medications:  Prior to Admission medications    Medication Sig Start Date End Date Taking? Authorizing Provider   cephALEXin (KEFLEX) 500 MG capsule Take 1 capsule by mouth 4 times daily for 7 days 7/29/21 8/5/21 Yes Kayode Mattson DO   doxycycline hyclate (VIBRA-TABS) 100 MG tablet Take 1 tablet by mouth 2 times daily for 7 days 7/29/21 8/5/21 Yes Kayode Mattson DO   pantoprazole (PROTONIX) 40 MG tablet Take 1 tablet by mouth every morning (before breakfast) 7/20/21   Anai Cabello MD   oxyCODONE HCl (OXY-IR) 10 MG immediate release tablet Take 1 tablet by mouth every 8 hours as needed for Pain for up to 30 days. 7/15/21 8/14/21  Anai Cabello MD   Insulin Syringe-Needle U-100 31G X 5/16\" 1 ML MISC Use to subcutaneously inject insulin three times daily 6/28/21   Anai Cabello MD   insulin regular human (HUMULIN R) 500 UNIT/ML concentrated injection vial Patient using 0.52ml with breakfast, 0.52ml with lunch and 0.45ml with dinner. 6/28/21   Anai Cabello MD   escitalopram (LEXAPRO) 10 MG tablet Take 1 tablet by mouth daily 6/14/21   Anai Cabello MD   Continuous Blood Gluc Sensor (FREESTYLE CRISTINE 14 DAY SENSOR) MISC Use one sensor every 14 days 6/2/21   Anai Cabello MD   rosuvastatin (CRESTOR) 10 MG tablet Take 1 tablet by mouth nightly Stop pravastatin 5/21/21   Anai Cabello MD   Continuous Blood Gluc Sensor (FREESTYLE CRISTINE 2 SENSOR) [facility] Patient to apply a new sensor every 14 days.   RX to cover voucher patient will bring in. 5/19/21   Anai Cabello MD   ammonium lactate (LAC-HYDRIN) 12 % cream Apply topically to dry skin BID. 5/14/21   Clarisa Hoff DPM   sulfacetamide (BLEPH-10) 10 % ophthalmic solution  3/24/21   Historical MD [place]   mupirocin (BACTROBAN) 2 % ointment Apply topically 2 times daily on the affected area for 7-10 days. OK to substitute to cream 4/23/21   Anders Waddell MD   Gauze Pads & Dressings 4\"X4\" PADS Twice a day dressing of right leg wound 4/23/21   Anders Waddell MD   Gauze Bandages (ROLLED GAUZE BANDAGE 4\"X2. 5YD) MISC For twice a day dressing 4/23/21   Anders Waddell MD   PROAIR  (90 Base) MCG/ACT inhaler INHALE TWO PUFFS BY MOUTH EVERY 6 HOURS AS NEEDED FOR WHEEZING OR FOR SHORTNESS OF BREATH 4/13/21   Anders Waddell MD   amitriptyline (ELAVIL) 50 MG tablet Take 2 po qhs 4/9/21   Star Currie MD   nystatin (MYCOSTATIN) 909180 UNIT/GM powder Apply 2 times daily in the skin folds for several months 3/31/21   Anders Waddell MD   tiZANidine (ZANAFLEX) 4 MG tablet TAKE ONE TABLET BY MOUTH EVERY 8 HOURS AS NEEDED FOR BACK PAIN 3/31/21   Anders Waddell MD   bumetanide (BUMEX) 1 MG tablet TAKE THREE TABLETS BY MOUTH TWICE A DAY 3/8/21   Anders Waddell MD   spironolactone (ALDACTONE) 50 MG tablet Take 1 tablet by mouth daily 2/24/21   Anders Waddell MD   vitamin D3 (CHOLECALCIFEROL) 25 MCG (1000 UT) TABS tablet Take 1 tablet by mouth daily 2/24/21   Anders Waddell MD   ammonium lactate (LAC-HYDRIN) 12 % lotion Apply topically daily.  For dry skin 2/23/21   Anders Waddell MD   clobetasol (TEMOVATE) 0.05 % ointment Apply topically 2 times daily for psoriasis 1/27/21   Anders Waddell MD   ketoconazole (NIZORAL) 2 % cream Apply twice a day for yeast infection in the skin folds, for 4 weeks 1/20/21   Anders Waddell MD   albuterol (PROVENTIL) (2.5 MG/3ML) 0.083% nebulizer solution Take 3 mLs by nebulization every 6 hours as needed for Wheezing or Shortness of Breath 1/15/21   Anders Waddell MD   Ferrous Sulfate (IRON) 325 (65 Fe) MG TABS Take 1 tablet by mouth daily 11/25/20   Anders Waddell MD   metoprolol tartrate (LOPRESSOR) 50 MG tablet Take 1 tablet by mouth 2 times daily 20   Lynann Apgar, MD   magnesium oxide (MAG-OX) 400 (240 Mg) MG tablet Take 1 tablet by mouth daily 20   Lynann Apgar, MD   lisinopril (PRINIVIL;ZESTRIL) 5 MG tablet Take 1 tablet by mouth daily . Discontinued Lisinopril  10 mg 20   Lynann Apgar, MD   fluticasone-salmeterol (ADVAIR HFA) 230-21 MCG/ACT inhaler Inhale 2 puffs into the lungs 2 times daily 10/19/20   Lynann Apgar, MD   tiotropium (SPIRIVA RESPIMAT) 2.5 MCG/ACT AERS inhaler Inhale 2 puffs into the lungs daily 10/19/20   Lynann Apgar, MD   venlafaxine (EFFEXOR XR) 75 MG extended release capsule Take 1 capsule by mouth daily Take with food 10/14/20   Lynann Apgar, MD   naloxone 4 MG/0.1ML LIQD nasal spray 1 spray by Nasal route as needed for Opioid Reversal Due to COPD and sleep apnea, this is a big recommendation for you 20   Lynann Apgar, MD   blood glucose monitor strips Test 3 times a day & as needed for symptoms of irregular blood glucose. Dispense sufficient amount for indicated testing frequency plus additional to accommodate PRN testing needs. One touch Ultra blue 20   Kojo Sinha MD   Lancets MISC Use to check blood sugar three times daily along with when necessary due to symptoms. 20   Kojo Sinha MD   clopidogrel (PLAVIX) 75 MG tablet TAKE ONE TABLET BY MOUTH DAILY 20   Lynann Apgar, MD   vitamin B-12 (CYANOCOBALAMIN) 500 MCG tablet Take 1 tablet by mouth daily 20   Lynann Apgar, MD   Oxygen Tubing MISC by Does not apply route DX COPD.  chronic respiratory failure 3/26/20   Lynann Apgar, MD   Respiratory Therapy Supplies (NEBULIZER/TUBING/MOUTHPIECE) KIT Dx COPD needs nebulizer supplies 20   Lynann Apgar, MD   nitroGLYCERIN (NITROSTAT) 0.4 MG SL tablet Place 1 tablet under the tongue every 5 minutes as needed for Chest pain (and call 911) 20   Lynann Apgar, MD   [facility] ULTRASOFT LANCETS MISC Patient to test blood sugar up to 4 times daily. 11/7/19   Ellie Mcdonald MD   Lancet Devices (LANCING DEVICE) MISC Provide patient with lancing device appropriate for his machine/lancing needles. 6/4/19   Terri Olivia MD   Lidocaine 4 % LOTN Apply topically    Historical Provider, MD   Spacer/Aero Chamber Mouthpiece MISC 1 each by Does not apply route once as needed (to be used with his inhalers) 4/15/19 5/24/21  Terri Olivia MD   metolazone (ZAROXOLYN) 2.5 MG tablet Take 2.5 mg by mouth three times a week MWF    Historical Provider, MD   Handicap Placard MISC by Does not apply route Can't walk greater than 200 feet. Expires in 5 years. 2/13/19   Paige Bryant MD   fluticasone (CUTIVATE) 0.05 % cream Apply topically 2 times daily  4/19/17   Historical Provider, MD   fluticasone (FLONASE) 50 MCG/ACT nasal spray 2 sprays by Nasal route daily  Patient taking differently: 2 sprays by Nasal route daily as needed (sinus symptoms)  1/16/17   Terri Olivia MD   Melatonin 10 MG TABS Take 10 mg by mouth nightly as needed (insomnia) 12/23/16   Terri Olivia MD   aspirin 81 MG EC tablet Take 81 mg by mouth daily. Historical Provider, MD       REVIEW OF SYSTEMS    (2-9 systems for level 4, 10 or more for level 5)      Review of Systems   Constitutional: Negative for chills and fever. HENT: Negative for congestion. Respiratory: Positive for shortness of breath (Patient noted to have chronic shortness of breath). Negative for chest tightness. Cardiovascular: Negative for chest pain and leg swelling. Gastrointestinal: Negative for abdominal pain, constipation, diarrhea, nausea and vomiting. Endocrine: Negative for polyuria. Genitourinary: Negative for difficulty urinating. Skin: Positive for color change (Venous stasis dermatitis noted on bilateral lower extremities) and wound (Right-sided venous stasis ulcer).    Neurological: Positive for numbness (Bilateral peripheral neuropathy). Negative for dizziness, weakness, light-headedness and headaches. Psychiatric/Behavioral: Negative for confusion. PHYSICAL EXAM   (up to 7 for level 4, 8 or more for level 5)      INITIAL VITALS:   BP (!) 135/58   Pulse 106   Temp 97 °F (36.1 °C) (Temporal)   Resp 24   Ht 6' (1.829 m)   Wt (!) 438 lb (198.7 kg)   SpO2 96%   BMI 59.40 kg/m²     Physical Exam  Constitutional:       Appearance: Normal appearance. HENT:      Head: Normocephalic and atraumatic. Mouth/Throat:      Mouth: Mucous membranes are moist.      Pharynx: Oropharynx is clear. Eyes:      Extraocular Movements: Extraocular movements intact. Conjunctiva/sclera: Conjunctivae normal.   Cardiovascular:      Rate and Rhythm: Normal rate and regular rhythm. Pulses: Normal pulses. Heart sounds: Normal heart sounds. No murmur heard. Pulmonary:      Effort: Pulmonary effort is normal.      Breath sounds: Normal breath sounds. Abdominal:      General: Bowel sounds are normal. There is no distension. Tenderness: There is no abdominal tenderness. There is no guarding. Musculoskeletal:         General: Normal range of motion. Comments: Range of motion noted to be normal with patient's natural movements   Skin:     General: Skin is warm and dry. Findings: No rash (On exposed skin). Neurological:      General: No focal deficit present. Mental Status: He is alert and oriented to person, place, and time.    Psychiatric:         Mood and Affect: Mood normal.         Behavior: Behavior normal.         DIFFERENTIAL  DIAGNOSIS     PLAN (LABS / IMAGING / EKG):  Orders Placed This Encounter   Procedures    XR TIBIA FIBULA RIGHT (2 VIEWS)    CBC Auto Differential    Basic Metabolic Panel w/ Reflex to MG       MEDICATIONS ORDERED:  Orders Placed This Encounter   Medications    cephALEXin (KEFLEX) capsule 500 mg     Order Specific Question:   Antimicrobial Indications Answer:   Skin and Soft Tissue Infection    cephALEXin (KEFLEX) 500 MG capsule     Sig: Take 1 capsule by mouth 4 times daily for 7 days     Dispense:  28 capsule     Refill:  0    doxycycline hyclate (VIBRA-TABS) 100 MG tablet     Sig: Take 1 tablet by mouth 2 times daily for 7 days     Dispense:  14 tablet     Refill:  0    doxycycline monohydrate (MONODOX) capsule 100 mg     Order Specific Question:   Antimicrobial Indications     Answer:   Skin and Soft Tissue Infection         DIAGNOSTIC RESULTS / EMERGENCY DEPARTMENT COURSE / MDM   LAB RESULTS:  Results for orders placed or performed during the hospital encounter of 07/29/21   CBC Auto Differential   Result Value Ref Range    WBC 8.0 3.5 - 11.0 k/uL    RBC 3.53 (L) 4.5 - 5.9 m/uL    Hemoglobin 9.8 (L) 13.5 - 17.5 g/dL    Hematocrit 30.5 (L) 41 - 53 %    MCV 86.4 80 - 100 fL    MCH 27.8 26 - 34 pg    MCHC 32.2 31 - 37 g/dL    RDW 15.5 (H) 11.5 - 14.9 %    Platelets 022 631 - 792 k/uL    MPV 8.4 6.0 - 12.0 fL    NRBC Automated NOT REPORTED per 100 WBC    Differential Type NOT REPORTED     Seg Neutrophils 78 (H) 36 - 66 %    Lymphocytes 11 (L) 24 - 44 %    Monocytes 7 1 - 7 %    Eosinophils % 3 0 - 4 %    Basophils 1 0 - 2 %    Immature Granulocytes NOT REPORTED 0 %    Segs Absolute 6.20 1.3 - 9.1 k/uL    Absolute Lymph # 0.90 (L) 1.0 - 4.8 k/uL    Absolute Mono # 0.60 0.1 - 1.3 k/uL    Absolute Eos # 0.30 0.0 - 0.4 k/uL    Basophils Absolute 0.10 0.0 - 0.2 k/uL    Absolute Immature Granulocyte NOT REPORTED 0.00 - 0.30 k/uL    WBC Morphology NOT REPORTED     RBC Morphology NOT REPORTED     Platelet Estimate NOT REPORTED    Basic Metabolic Panel w/ Reflex to MG   Result Value Ref Range    Glucose 120 (H) 70 - 99 mg/dL    BUN 35 (H) 6 - 20 mg/dL    CREATININE 1.63 (H) 0.70 - 1.20 mg/dL    Bun/Cre Ratio NOT REPORTED 9 - 20    Calcium 9.5 8.6 - 10.4 mg/dL    Sodium 141 135 - 144 mmol/L    Potassium 4.1 3.7 - 5.3 mmol/L    Chloride 105 98 - 107 mmol/L    CO2 24 20 - 31 mmol/L    Anion Gap 12 9 - 17 mmol/L    GFR Non-African American 44 (L) >60 mL/min    GFR  53 (L) >60 mL/min    GFR Comment          GFR Staging NOT REPORTED        RADIOLOGY:  XR TIBIA FIBULA RIGHT (2 VIEWS)   Final Result   No acute bony abnormalities are noted                EMERGENCY DEPARTMENT COURSE:      Patient presented with a venous stasis ulcer, she was a tenderness around the ulcer site. Concerns for osteomyelitis and worsening infection x-ray was obtained which was noted to be negative. CBC which was noted to have no leukocytosis. Patient was started on antibiotics, patient was noted to be afebrile with no signs of systemic infection and concerns for sepsis. Patient was discharged home in good condition with follow-up with wound care on 8/3/2021. PROCEDURES:  None    CONSULTS:  None    MEDICAL DECISION MAKING:  See ED course    CRITICAL CARE:  Please see attending note    FINAL IMPRESSION      1.  Venous stasis ulcer of ankle limited to breakdown of skin without varicose veins, unspecified laterality ([facility] [address]          DISPOSITION / PLAN     DISPOSITION Decision To Discharge 07/29/2021 09:53:39 PM      PATIENT REFERRED TO:  Emmie Rosenthal MD  [address] [place] [address]  [phone]    In 1 week        DISCHARGE MEDICATIONS:  Discharge Medication List as of 7/29/2021 10:04 PM      START taking these medications    Details   cephALEXin (KEFLEX) 500 MG capsule Take 1 capsule by mouth 4 times daily for 7 days, Disp-28 capsule, R-0Print      doxycycline hyclate (VIBRA-TABS) 100 MG tablet Take 1 tablet by mouth 2 times daily for 7 days, Disp-14 tablet, R-0Print             Twila Walters,   Emergency Medicine Resident    (Please note that portions of thisnote were completed with a voice recognition program.  Efforts were made to edit the dictations but occasionally words are mis-transcribed.)       Cici Ayala DO  Resident  07/30/21

## 2021-07-29 NOTE — PROGRESS NOTES
Diabetic Medication Management   [address]    [address] [facility]. [place], [address] [facility]  Phone: [phone]  Fax: [phone]    NAME: Liana Booth. MEDICAL RECORD NUMBER:  [id number]  AGE: 63 y.o. GENDER: male  : 1965  EPISODE DATE:  2021       Mr. Tray Mahoney was referred to [facility] by Dr. King Nevarez, Special instructions include: titrate all medications (as defined in clinic's policy and procedure)    Patient seen in office. Goals per referral:   Fasting blood glucose: < 130  Peak postprandial glucose: < 180  A1C: < 7    Other goals:  Blood pressure goal: 130/80  Weight loss goal (~10%): Target weight  410 to be reached by date: Aug 2021 (3-6 months)  Physical Activity goal:    At this time regular exercise is not realistic for patient so no goal set. Smoking Cessation   Quit Date: Stopped 20. Cholesterol at target:   Date: Yes LDL 46  HDL 32 Trig 213 (2021)  Annual eye exam:    Date: 2021  Comprehensive annual foot exam:   Date:    Annual urine Albumin and serum creatinine:   Date:  microalbumin <12 mg/L (21), SrCr 1.70 mg/dL (21) eGFR 44.5 ml/min        Subjective   Mr. Tray Mahoney is a 63 y.o. male here for the Diabetes Service for self-management education, medication review including over the counter medications and herbal products, overall well-being assessment, transition of care and any needed adjustments with updates and recommendations communicated to the referring physician. Patient's name and  verified. Patient Findings:    Patient is her in person for diabetes medication management. Patient has been in contact with our service via phone earlier this week due to hypoglycemia. On  and  patient reduced insulin to .40 in evening which helped. Yesterday,  patient increased his insulin in the evening back to .45 and notes blood sugar of 82 this am with no hypoglycemia.       Patient has [facility] and brings in  for evaluation. Incuron used to review glucose reports. Patient has been using adhesive patches to help keep patches on which seem to be helping. Patient has not smoked marijuana since a few days prior to last appt. Plans to abstain. States anxiety and pain has been somewhat worse since stopping but understands this is best for him. Patient has wound on right leg that is not doing well. Patient states if doesn't look good when unwraps today plans to have daughter call wound care to return for monitoring / therapy. Encouraged patient to call. Patient reports finishing a course of doxycycline recently for infections he has had chronically under his arms. States the appear to be back under control. Patient has been compliant with insulin injections other than 1-2 times per week when patient is not at home and does not have insulin with him. Patient indicates even though he has a note explaining he is a diabetic he feels if he had syringes in his car and if pulled over it would be more hassle than it is worth. When patient has hypoglycemia he eats a hard candy and drinks orange juice. Review of blood sugar readings around hypoglycemia show patient's blood sugars often rebound up more than needed. Educated patient to only drink OJ when hypoglycemia as states this works for him (4-6oz). Patient's blood sugars often go down significantly again likely as not eating some form of protein/ longer acting sugar. Suggested once blood sugar comes back up to safe level to eat some peanut butter or other protein source to help avoid further hypoglycemia. Patient having continued anemia.   Quite a bit of lab work recently done and has an appt to see Dr. Josie Barrientos  States anxious as this physician is an oncologist.  Explained that he is to go to this provider due to anemia and to not be concerned as providers that manage cancer are also providers that manage blood related issues. Patient continues to skip meals or just eat a snack instead of full meal.  Stays in room often to avoid contact with other family members. Has peanut butter, crackers and pretzels in room and states granddaughter will make him cut up fruit at times. Can not eat veges unless cooked very well due to lack of teeth. Encouraged him to at least have a snack rather than skip meal all together. []  Missed doses   []  Emergency Room Visit    [x]  Medication changes  []  Hospitalization   [x]  Diet changes   [x]  Acute illness   []  Activity changes      Symptoms of hypoglycemia    [x]  None    []  Shakiness    [x]  Lightheadedness or dizziness   []  Confusion      []  Sweating   []  Other        Symptoms of hyperglycemia -.    []  None   [x]  Frequent urination    []  Increased thirst   []  Other    Medication adverse reactions (none due to diabetic medicaitons)   [x]  None   []  Diarrhea / Nausea / Vomiting / Constipation / flatulence   []  Hypertension   []  Peripheral edema     []  Signs of infection   []  Headache   []  Vision changes   []  Increased cholesterol    []  Weight gain   []  Change in renal function   []  Increased potassium  []  Other      Comments:  Patient takes Humulin R U500. Patient has been taking Humulin R U500 0.52ml with breakfast, 0.52ml with lunch and 0.45 with dinner.       If recent hospital admission / ED visit, was this related to Diabetes No:Fall Discharge date 11/9/20    Objective     PMHx:    Past Medical History:   Diagnosis Date    Acute on chronic kidney failure ([facility] 7/20/2017    Acute on chronic respiratory failure (HCC) 10/2/2018    Adhesive capsulitis of left shoulder 3/25/2017    Anxiety 10/02/2016    smokes marijuana for this    Arthropathy, unspecified, other specified sites 6/13/2013    Asthma     B12 deficiency     Bilateral lower leg cellulitis 2/17/2016    Blood in stool     CAD (coronary artery disease)     Cellulitis of both lower extremities 5/25/2017    Cellulitis of leg, left 07/20/2017    CHF (congestive heart failure), NYHA class III ([facility]) 8/14/2013    Chronic back pain     Chronic bronchitis ([facility])     Chronic headaches     was referred to neuro, testing scheduled    Chronic respiratory failure ([address]     was on vent    Chronic ulcer of left leg, with fat layer exposed ([address] 02/22/2019    healed    Class 2 severe obesity due to excess calories with serious comorbidity and body mass index (BMI) of 35.0 to 35.9 in adult ([facility])     (BMI 35.0-39.9 without comorbidity)    COPD exacerbation ([address] 11/2/2016    Diabetic neuropathy ([address] 8/14/2013    Displacement of lumbar intervertebral disc without myelopathy 6/13/2013    Ear infection     RIGHT    Essential hypertension     Facial cellulitis 2012    Fall 3/25/2017    GERD (gastroesophageal reflux disease)     Head injury     Hearing loss in right ear     pencil pierced ear as a child    Hepatic steatosis 12/3/2015    History of general anesthesia complication     has woke up during surgery under anesthesia    History of rib fracture 12/3/2015    Chronic     Hyperlipidemia     Hypersomnia     can go multiple days without sleeping    Hypertension     Insomnia     Intolerance of continuous positive airway pressure (CPAP) ventilation 7/20/2017    Iron deficiency     Localized rash     gets frequently in axilla, groin, in any fold, on several topical treatments for this    Magnesium deficiency     Mastoiditis of left side     Mixed conductive and sensorineural hearing loss of both ears 1/10/2017    Per ENT    Mixed type COPD (chronic obstructive pulmonary disease) ([address]     On home O2, multiple inhlaers, nebulizer    Moderate recurrent major depression ([address] 10/2/2016    Morbid obesity with BMI of 45.0-49.9, adult ([address] 6/16/2015    On home oxygen therapy     3 Lpm prn    Open wound of groin 12/19/2018    healed     BARBY on CPAP     Osteoarthritis     Otitis externa of left ear     Pancreatitis chronic     Persistent depressive disorder 2019    Renal insufficiency     proteinuria    Severe depression ([facility] 2013    Spinal stenosis of lumbar region without neurogenic claudication 2016    MRI lumbar 12/30/15 L3-L4: There is broad-based bulging disc which appears protruding left laterally causing flattening of the ventral thecal sac. In addition, there is facet arthropathy with mild hypertrophic changes.  There is borderline central canal stenosis with  evidence of moderate left neural foraminal narrowing and mild right neural foramina narrowing.   L4-L5: There is broad-based protrud    Syncope 2017    Tinnitus of both ears 1/10/2017    Per ENT    Type 2 diabetes mellitus with stage 3 chronic kidney disease, with long-term current use of insulin ([facility] [address] 2016    due to underlying condition with hyperosmolarity without coma    Type II or unspecified type diabetes mellitus without mention of complication, not stated as uncontrolled     uncontrolled    Vitamin D deficiency     Wears glasses     for reading       Social History:    Social History     Tobacco Use    Smoking status: Former Smoker     Packs/day: 0.25     Years: 33.00     Pack years: 8.25     Types: Cigarettes     Start date: 1985     Quit date: 2020     Years since quittin.7    Smokeless tobacco: Former User     Types: Snuff     Quit date: 1995   Substance Use Topics    Alcohol use: No     Alcohol/week: 0.0 standard drinks       Pertinent Labs:    Lab Results   Component Value Date    LABA1C 7.6 2021    LABA1C 8.0 2021    LABA1C 8.7 (H) 2021     Lab Results   Component Value Date    CHOL 121 2021    TRIG 213 (H) 2021    HDL 32 (L) 2021     Lab Results   Component Value Date    CREATININE 1.67 (H) 2021    BUN 33 (H) 2021     2021    K 4.4 2021     2021    CO2 26 2021 Lab Results   Component Value Date    ALT 11 07/24/2021         Wt Readings from Last 3 Encounters:   07/20/21 (!) 427 lb (193.7 kg)   05/24/21 (!) 420 lb (190.5 kg)   05/03/21 (!) 417 lb (189.1 kg)       Current medications:  Prior to Admission medications    Medication Sig Start Date End Date Taking? Authorizing Provider   pantoprazole (PROTONIX) 40 MG tablet Take 1 tablet by mouth every morning (before breakfast) 7/20/21   Destiny Trotter MD   oxyCODONE HCl (OXY-IR) 10 MG immediate release tablet Take 1 tablet by mouth every 8 hours as needed for Pain for up to 30 days. 7/15/21 8/14/21  Destiny Trotter MD   Insulin Syringe-Needle U-100 31G X 5/16\" 1 ML MISC Use to subcutaneously inject insulin three times daily 6/28/21   Destiny Trotter MD   insulin regular human (HUMULIN R) 500 UNIT/ML concentrated injection vial Patient using 0.52ml with breakfast, 0.52ml with lunch and 0.45ml with dinner. 6/28/21   Destiny Trotter MD   escitalopram (LEXAPRO) 10 MG tablet Take 1 tablet by mouth daily 6/14/21   Destiny Trotter MD   Continuous Blood Gluc Sensor (FREESTYLE CRISTINE 14 DAY SENSOR) MISC Use one sensor every 14 days 6/2/21   Destiny Trotter MD   rosuvastatin (CRESTOR) 10 MG tablet Take 1 tablet by mouth nightly Stop pravastatin 5/21/21   Destiny Trotter MD   Continuous Blood Gluc Sensor (FREESTYLE CRISTINE 2 SENSOR) [facility] Patient to apply a new sensor every 14 days. RX to cover voucher patient will bring in. 5/19/21   Destiny Trotter MD   ammonium lactate (LAC-HYDRIN) 12 % cream Apply topically to dry skin BID. 5/14/21   Karen Burkett DPM   sulfacetamide (BLEPH-10) 10 % ophthalmic solution  3/24/21   Historical Provider, MD   mupirocin (BACTROBAN) 2 % ointment Apply topically 2 times daily on the affected area for 7-10 days.  OK to substitute to cream 4/23/21   Destiny Trotter MD   Gauze Pads & Dressings 4\"X4\" PADS Twice a day dressing of right leg wound 4/23/21   Paige MD Manny   Gauze Bandages (ROLLED GAUZE BANDAGE 4\"X2. 5YD) MISC For twice a day dressing 4/23/21   Lennox Oregon, MD   PROAIR  (90 Base) MCG/ACT inhaler INHALE TWO PUFFS BY MOUTH EVERY 6 HOURS AS NEEDED FOR WHEEZING OR FOR SHORTNESS OF BREATH 4/13/21   Lennox Oregon, MD   amitriptyline (ELAVIL) 50 MG tablet Take 2 po qhs 4/9/21   Alan Humphries MD   nystatin (MYCOSTATIN) 280568 UNIT/GM powder Apply 2 times daily in the skin folds for several months 3/31/21   Lennox Oregon, MD   tiZANidine (ZANAFLEX) 4 MG tablet TAKE ONE TABLET BY MOUTH EVERY 8 HOURS AS NEEDED FOR BACK PAIN 3/31/21   Lennox Oregon, MD   bumetanide (BUMEX) 1 MG tablet TAKE THREE TABLETS BY MOUTH TWICE A DAY 3/8/21   Lennox Oregon, MD   spironolactone (ALDACTONE) 50 MG tablet Take 1 tablet by mouth daily 2/24/21   Lennox Oregon, MD   vitamin D3 (CHOLECALCIFEROL) 25 MCG (1000 UT) TABS tablet Take 1 tablet by mouth daily 2/24/21   Lennox Oregon, MD   ammonium lactate (LAC-HYDRIN) 12 % lotion Apply topically daily.  For dry skin 2/23/21   Lennox Oregon, MD   clobetasol (TEMOVATE) 0.05 % ointment Apply topically 2 times daily for psoriasis 1/27/21   Lennox Oregon, MD   ketoconazole (NIZORAL) 2 % cream Apply twice a day for yeast infection in the skin folds, for 4 weeks 1/20/21   Lennox Oregon, MD   albuterol (PROVENTIL) (2.5 MG/3ML) 0.083% nebulizer solution Take 3 mLs by nebulization every 6 hours as needed for Wheezing or Shortness of Breath 1/15/21   Lennox Oregon, MD   Ferrous Sulfate (IRON) 325 (65 Fe) MG TABS Take 1 tablet by mouth daily 11/25/20   Lennox Oregon, MD   metoprolol tartrate (LOPRESSOR) 50 MG tablet Take 1 tablet by mouth 2 times daily 11/4/20   Lennox Oregon, MD   magnesium oxide (MAG-OX) 400 (240 Mg) MG tablet Take 1 tablet by mouth daily 11/4/20   Lennox Oregon, MD   lisinopril (PRINIVIL;ZESTRIL) 5 MG tablet Take 1 tablet by mouth daily .Discontinued Lisinopril  10 mg 11/2/20   Stacy Joyner MD   fluticasone-salmeterol (ADVAIR HFA) 230-21 MCG/ACT inhaler Inhale 2 puffs into the lungs 2 times daily 10/19/20   Stacy Joyner MD   tiotropium (SPIRIVA RESPIMAT) 2.5 MCG/ACT AERS inhaler Inhale 2 puffs into the lungs daily 10/19/20   Stacy Joyner MD   venlafaxine (EFFEXOR XR) 75 MG extended release capsule Take 1 capsule by mouth daily Take with food 10/14/20   Stacy Joyner MD   naloxone 4 MG/0.1ML LIQD nasal spray 1 spray by Nasal route as needed for Opioid Reversal Due to COPD and sleep apnea, this is a big recommendation for you 9/30/20   Stacy Joyner MD   blood glucose monitor strips Test 3 times a day & as needed for symptoms of irregular blood glucose. Dispense sufficient amount for indicated testing frequency plus additional to accommodate PRN testing needs. One touch Ultra blue 9/30/20   Delicia England MD   Lancets MISC Use to check blood sugar three times daily along with when necessary due to symptoms. 9/30/20   Delicia England MD   clopidogrel (PLAVIX) 75 MG tablet TAKE ONE TABLET BY MOUTH DAILY 7/29/20   Stacy Joyner MD   vitamin B-12 (CYANOCOBALAMIN) 500 MCG tablet Take 1 tablet by mouth daily 7/13/20   Stacy Joyner MD   Oxygen Tubing MISC by Does not apply route DX COPD. chronic respiratory failure 3/26/20   Stacy Joyner MD   Respiratory Therapy Supplies (NEBULIZER/TUBING/MOUTHPIECE) KIT Dx COPD needs nebulizer supplies 2/20/20   Stacy Joyner MD   nitroGLYCERIN (NITROSTAT) 0.4 MG SL tablet Place 1 tablet under the tongue every 5 minutes as needed for Chest pain (and call 911) 2/13/20   Stacy Joyner MD   ONE TOUCH ULTRASOFT LANCETS [address] [facility] Patient to test blood sugar up to 4 times daily. 11/7/19   Delicia England MD   Lancet Devices (LANCING DEVICE) MISC Provide patient with lancing device appropriate for his machine/lancing needles.  6/4/19   Stacy Joyner MD Lidocaine 4 % LOTN Apply topically    Historical Provider, MD   Spacer/Aero Chamber Mouthpiece MISC 1 each by Does not apply route once as needed (to be used with his inhalers) 4/15/19 5/24/21  Destiny Trotter MD   metolazone (ZAROXOLYN) 2.5 MG tablet Take 2.5 mg by mouth three times a week MWF    Historical Provider, MD   Handicap Placard MISC by Does not apply route Can't walk greater than 200 feet. Expires in 5 years. 2/13/19   Paige Bryant MD   fluticasone (CUTIVATE) 0.05 % cream Apply topically 2 times daily  4/19/17   Historical Provider, MD   fluticasone (FLONASE) 50 MCG/ACT nasal spray 2 sprays by Nasal route daily  Patient taking differently: 2 sprays by Nasal route daily as needed (sinus symptoms)  1/16/17   Destiny Trotter MD   Melatonin 10 MG TABS Take 10 mg by mouth nightly as needed (insomnia) 12/23/16   Destiny Trotter MD   aspirin 81 MG EC tablet Take 81 mg by mouth daily. Historical Provider, MD       Immunizations:   Most Recent Immunizations   Administered Date(s) Administered    COVID-19, Moderna, PF, 100mcg/0.5mL 05/27/2021    DT (pediatric) 12/14/1998    Hepatitis B Adult (Engerix-B) 12/04/2019    Hepatitis B Adult (Recombivax HB) 12/04/2019    Influenza Virus Vaccine 10/12/2015    Influenza, Quadv, IM, PF (6 mo and older Fluzone, Flulaval, Fluarix, and 3 yrs and older Afluria) 10/09/2020    Pneumococcal Polysaccharide ([id number]) 05/23/2013    Tdap (Boostrix, Adacel) 09/12/2018       Home Blood Glucose Results:   Per patient's Freestyle Linane report below blood sugar well controlled with some hypoglycemia. Assessment     A1c at goal:No: 7.6 (7/20/21) (decreasing)  Blood Pressure at goal: 130/84 on 7/20/21  Weight at goal: No:    Physical activity: No  Physical activity at goal: No  Patient encouraged but unable to due SOB. Smoking Cessation: Yes    Cholesterol at target:  Yes  Annual eye exam completed: Yes  Comprehensive Foot Exam Completed: Yes  Annual urine albumin and serum creatinine: Yes    Statin: Yes    Appropriate?: Yes  Changes made: refill provided    ACE/ARB: Yes  Appropriate?: Yes  Changes made:     Aspirin: Yes  Appropriate?: Yes  Changes made:     Eating patterns:    [x]  My plate    []  Mediterranean diet   []  Low sodium   []  DASH diet   [x]  Portion control   [x]  Reduced calorie    []  Fast food / Restaurant  []  High glycemic index foods   []  Sugary beverages   []  Other     Comment: see above    Current Medications Affecting Diabetes:  Humulin R 500    Compliant:No  Barriers to medication therapy: anxiety / depression    Medications attempted in the past:  Metformin - cardiologist took him off but patient unsure why  Januvia - PCP took him off but patient unsure why    Recent exacerbations / new problems:  Infection / fall - ankle pain    Last office dictation reviewed: yes        type 2 DM under improving control as evidenced by A1C 7.6 on 7/20/21    Plan   Counseling at today's visit:     -Continued monitoring of blood glucose with use of Freestyle Lianne. Call with any issues with sensor. Bring data cord to each visit. Increased dose of insulin:0.55ml with breakfast,  Continued 0.52ml with lunch and reduced 0.42ml with dinner. Patient has log to document time and what he eats as far as meals given to him at previous visit. Will compare blood sugars around meals to determine need for adjustment. Patient to also write down times he takes insulin to correlate with reports from [facility]. Forgot to do before this appt but intends to complete before next appt. Follow up with wound care on leg ulcers  Physician Follow-up:   Dr Perri Lake     Medication Management Follow-up:   Diabetes Service   4 weeks in person or sooner  Via phone if further hypoglycemia.     Electronically signed by Keron Dwyer RPH on 7/29/2021 at 7:29 AM     CLINICAL PHARMACY CONSULT: MED RECONCILIATION/REVIEW ADDENDUM    For Pharmacy Admin Tracking Only    PHSO: No  Total # of Interventions Recommended: 3  - Updated Order #: 3 Updated Order Reason(s):  Other  Total Interventions Accepted: 3  Time Spent (min): 45    Yesenia Rizvi RPH, PharmD  [address]

## 2021-07-30 ASSESSMENT — ENCOUNTER SYMPTOMS
NAUSEA: 0
SHORTNESS OF BREATH: 1
COLOR CHANGE: 1
CONSTIPATION: 0
VOMITING: 0
DIARRHEA: 0
ABDOMINAL PAIN: 0
CHEST TIGHTNESS: 0

## 2021-07-30 NOTE — ED PROVIDER NOTES
EMERGENCY DEPARTMENT ENCOUNTER   ATTENDING ATTESTATION     Pt Name: Golden Toussaint MRN: [id number]  [place] 1965  Date of evaluation: 7/29/21       Golden Toussaint is a 63 y.o. male who presents with Wound Infection (Pt states diabetic stating he has a wound on RLE stating it is draining, appt for wound care 8/3 )      MDM:   41-year-old male history of chronic venous stasis, diabetes presents for evaluation of right lower extremity pain and rash. Concern for superimposed infection on chronic venous stasis ulcers. Patient was borderline tachycardic we will get some basic labs and an x-ray to rule out deeper infection. Vitals:   Vitals:    07/29/21 1928   BP: (!) 135/58   Pulse: 106   Resp: 24   Temp: 97 °F (36.1 °C)   TempSrc: Temporal   SpO2: 96%   Weight: (!) 438 lb (198.7 kg)   Height: 6' (1.829 m)         I personally evaluated and examined the patient in conjunction with the resident and agree with the assessment, treatment plan, and disposition of the patient as recorded by the resident. I performed a history and physical examination of the patient and discussed management with the resident. I reviewed the residents note and agree with the documented findings and plan of care. Any areas of disagreement are noted on the chart. I was personally present for the key portions of any procedures. I have documented in the chart those procedures where I was not present during the key portions. I have personally reviewed all images and agree with the resident's interpretation. I have reviewed the emergency nurses triage note. I agree with the chief complaint, past medical history, past surgical history, allergies, medications, social and family history as documented unless otherwise noted.     Ulisses Guevara MD  Attending Emergency Physician            Ulisses Guevara MD  07/30/21 [id number]

## 2021-07-30 NOTE — ED NOTES
Dressing applied to RLE ucler before pt. discharge     Jonas Santiago RN  07/29/21 5627
Mode of arrival (squad #, walk in, police, etc) : Walk in         Chief complaint(s): Wound        Arrival Note (brief scenario, treatment PTA, etc). : Pt states having a diabetic ulcer  On RLE draining that has enlarged the past 2 days. Pt denies NVD, chills, and fever Pt is A&Ox4, in no acute distress, respirations even and unlabored, ambulatory with steady gait. C= \"Have you ever felt that you should Cut down on your drinking? \"  No  A= \"Have people Annoyed you by criticizing your drinking? \"  No  G= \"Have you ever felt bad or Guilty about your drinking? \"  No  E= \"Have you ever had a drink as an Eye-opener first thing in the morning to steady your nerves or to help a hangover? \"  No      Deferred []      Reason for deferring: N/A    *If yes to two or more: probable alcohol abuse. Nestor Singh RN  07/29/21 [id number]
no

## 2021-07-31 DIAGNOSIS — I25.118 CORONARY ARTERY DISEASE OF NATIVE ARTERY OF NATIVE HEART WITH STABLE ANGINA PECTORIS (HCC): ICD-10-CM

## 2021-08-02 ENCOUNTER — TELEPHONE (OUTPATIENT)
Dept: FAMILY MEDICINE CLINIC | Age: 56
End: 2021-08-02

## 2021-08-02 RX ORDER — NITROGLYCERIN 0.4 MG/1
TABLET SUBLINGUAL
Qty: 25 TABLET | Refills: 2 | Status: SHIPPED | OUTPATIENT
Start: 2021-08-02

## 2021-08-02 NOTE — TELEPHONE ENCOUNTER
[facility]) ED Follow up Call          FU appts/Provider:    Future Appointments   Date Time Provider William Givensi   8/3/2021  2:15 PM NICOLE Rascon - CNP [facility]   8/13/2021 10:40 AM Star Currie MD [facility]   8/26/2021  7:30 AM [facility] MEDICATION [facility] MED [facility]   8/31/2021 12:30 PM Teresa Palma MD [facility]   9/7/2021  9:50 AM Shari Yeung MD [facility]   10/21/2021  9:00 AM Anders Waddell MD [facility]       VOICEMAIL DOCUMENTATION - ERASE IF NOT USED  Hi, this message is for  Miguel  This is Karla from [address] [place] [address] office. Just calling to see how you are doing after your recent visit to the Emergency Room. [address] [place] [address] wants to make sure you were able to fill any prescriptions and that you understand your discharge instructions. Please return our call if you need to make a follow up appointment with your provider or have any further needs. Our phone number is [phone]. Have a great day.

## 2021-08-03 ENCOUNTER — HOSPITAL ENCOUNTER (OUTPATIENT)
Dept: WOUND CARE | Age: 56
Discharge: HOME | End: 2021-08-03
Payer: COMMERCIAL

## 2021-08-03 VITALS
HEART RATE: 91 BPM | WEIGHT: 315 LBS | BODY MASS INDEX: 42.66 KG/M2 | DIASTOLIC BLOOD PRESSURE: 68 MMHG | SYSTOLIC BLOOD PRESSURE: 142 MMHG | TEMPERATURE: 98.2 F | RESPIRATION RATE: 24 BRPM | HEIGHT: 72 IN

## 2021-08-03 DIAGNOSIS — L97.911 NON-PRESSURE CHRONIC ULCER OF RIGHT LOWER LEG, LIMITED TO BREAKDOWN OF SKIN (HCC): Primary | ICD-10-CM

## 2021-08-03 DIAGNOSIS — I87.2 VENOUS INSUFFICIENCY OF BOTH LOWER EXTREMITIES: ICD-10-CM

## 2021-08-03 DIAGNOSIS — E11.42 TYPE 2 DIABETES MELLITUS WITH DIABETIC POLYNEUROPATHY, WITH LONG-TERM CURRENT USE OF INSULIN (HCC): ICD-10-CM

## 2021-08-03 DIAGNOSIS — E66.01 MORBID OBESITY WITH BMI OF 50.0-59.9, ADULT (HCC): ICD-10-CM

## 2021-08-03 DIAGNOSIS — I89.0 LYMPHEDEMA OF BOTH LOWER EXTREMITIES: ICD-10-CM

## 2021-08-03 DIAGNOSIS — Z79.4 TYPE 2 DIABETES MELLITUS WITH DIABETIC POLYNEUROPATHY, WITH LONG-TERM CURRENT USE OF INSULIN (HCC): ICD-10-CM

## 2021-08-03 DIAGNOSIS — I87.2 STASIS DERMATITIS OF BOTH LEGS: ICD-10-CM

## 2021-08-03 PROBLEM — L97.922 CHRONIC ULCER OF LEG, LEFT, WITH FAT LAYER EXPOSED (HCC): Status: RESOLVED | Noted: 2021-04-27 | Resolved: 2021-08-03

## 2021-08-03 PROCEDURE — 11042 DBRDMT SUBQ TIS 1ST 20SQCM/<: CPT | Performed by: NURSE PRACTITIONER

## 2021-08-03 PROCEDURE — 11042 DBRDMT SUBQ TIS 1ST 20SQCM/<: CPT

## 2021-08-03 PROCEDURE — 11045 DBRDMT SUBQ TISS EACH ADDL: CPT

## 2021-08-03 PROCEDURE — 99213 OFFICE O/P EST LOW 20 MIN: CPT

## 2021-08-03 RX ORDER — LIDOCAINE HYDROCHLORIDE 40 MG/ML
SOLUTION TOPICAL ONCE
Status: COMPLETED | OUTPATIENT
Start: 2021-08-03 | End: 2021-08-03

## 2021-08-03 RX ORDER — LIDOCAINE HYDROCHLORIDE 40 MG/ML
SOLUTION TOPICAL ONCE
Status: CANCELLED | OUTPATIENT
Start: 2021-08-03 | End: 2021-08-03

## 2021-08-03 RX ADMIN — LIDOCAINE HYDROCHLORIDE 15 ML: 40 SOLUTION TOPICAL at 14:30

## 2021-08-03 ASSESSMENT — PAIN DESCRIPTION - FREQUENCY: FREQUENCY: INTERMITTENT

## 2021-08-03 ASSESSMENT — PAIN DESCRIPTION - PAIN TYPE: TYPE: CHRONIC PAIN

## 2021-08-03 ASSESSMENT — PAIN DESCRIPTION - ONSET: ONSET: ON-GOING

## 2021-08-03 ASSESSMENT — PAIN SCALES - GENERAL: PAINLEVEL_OUTOF10: 8

## 2021-08-03 ASSESSMENT — PAIN DESCRIPTION - LOCATION: LOCATION: LEG

## 2021-08-03 ASSESSMENT — PAIN DESCRIPTION - ORIENTATION: ORIENTATION: RIGHT;LOWER

## 2021-08-03 ASSESSMENT — PAIN DESCRIPTION - DESCRIPTORS: DESCRIPTORS: BURNING;ACHING

## 2021-08-03 ASSESSMENT — PAIN DESCRIPTION - PROGRESSION: CLINICAL_PROGRESSION: NOT CHANGED

## 2021-08-03 NOTE — PROGRESS NOTES
[address]   Progress Note and Procedure Note      Miguel Guillen. MEDICAL RECORD NUMBER:  [id number]  AGE: 63 y.o. GENDER: male  : 1965  EPISODE DATE:  8/3/2021    Subjective:     Chief Complaint   Patient presents with    Wound Check     right lower leg lateral posterior         HISTORY of PRESENT ILLNESS HPI     Oneida Duarte is a 63 y.o. male who presents today for wound/ulcer evaluation. History of Wound Context: present to wound clinic today for evaluation of right lower leg ulceration. Had blisters that opened up. Was seen in wound clinic earlier this year for similar wounds and did well with unna boots at that time.  Seen in ER for cellulitis 2021 - sent home on keflex and doxycycline   Wound/Ulcer Pain Timing/Severity: constant  Quality of pain: burning  Severity:  4 / 10   Modifying Factors: Pain worsens with debridement  Associated Signs/Symptoms: edema and drainage    Ulcer Identification:  Ulcer Type: venous  Contributing Factors: edema, venous stasis, lymphedema, diabetes, decreased mobility and obesity    Wound: N/A        PAST MEDICAL HISTORY        Diagnosis Date    Acute on chronic kidney failure ([address] 2017    Acute on chronic respiratory failure ([address] 10/2/2018    Adhesive capsulitis of left shoulder 3/25/2017    Anxiety 10/02/2016    smokes marijuana for this    Arthropathy, unspecified, other specified sites 2013    Asthma     B12 deficiency     Bilateral lower leg cellulitis 2016    Blood in stool     CAD (coronary artery disease)     Cellulitis of both lower extremities 2017    Cellulitis of leg, left 2017    CHF (congestive heart failure), NYHA class III (HCC) 2013    Chronic back pain     Chronic bronchitis (HCC)     Chronic headaches     was referred to neuro, testing scheduled    Chronic respiratory failure ([address]     was on vent    Chronic ulcer of left leg, with fat layer exposed flattening of the ventral thecal sac. In addition, there is facet arthropathy with mild hypertrophic changes.  There is borderline central canal stenosis with  evidence of moderate left neural foraminal narrowing and mild right neural foramina narrowing.   L4-L5: There is broad-based protrud    Syncope 4/28/2017    Tinnitus of both ears 1/10/2017    Per ENT    Type 2 diabetes mellitus with stage 3 chronic kidney disease, with long-term current use of insulin ([address] 12/26/2016    due to underlying condition with hyperosmolarity without coma    Type II or unspecified type diabetes mellitus without mention of complication, not stated as uncontrolled     uncontrolled    Vitamin D deficiency     Wears glasses     for reading       PAST SURGICAL HISTORY    Past Surgical History:   Procedure Laterality Date    BACK SURGERY   (x 4) 2000,.12/2011.2/2012     Dr Philip Flower last [address]  04/23/2018    no stenting    COLONOSCOPY  11/3/2015    hemorrhoids, poor prep, not done    COLONOSCOPY  2013    COLONOSCOPY N/A 4/12/2021    COLONOSCOPY POLYPECTOMY SNARE/COLD BIOPSY/HOT BIOPSY/CLIP APPLICATION X1 performed by Denise Devlin MD at [address] [facility]  March 2013    x 1    HAND TENDON SURGERY Left     thumb tendon repair    INTRACAPSULAR CATARACT EXTRACTION Right 11/5/2019    EYE CATARACT EMULSIFICATION IOL IMPLANT performed by Nik Wharton MD at [facility] Left 1/7/2020    EYE CATARACT EMULSIFICATION IOL IMPLANT performed by Nik Wharton MD at [facility] ARTHROSCOPY Left     NERVE BLOCK  07-31-15    TENS unit    OR ESOPHAGOGASTRODUODENOSCOPY TRANSORAL DIAGNOSTIC N/A 7/18/2018    EGD ESOPHAGOGASTRODUODENOSCOPY performed by Denise Devlin MD at [facility] Bilateral 09/20/2012    Dr Isabela Pantoja History   Problem Relation Age of Onset    Heart Disease Mother          age 64 from MI    High Blood Pressure Mother     Diabetes Mother     High Blood Pressure Father          age 80 from CKD and Lung Fibrosis    Kidney Disease Father     Heart Disease Sister     Heart Attack Sister     Obesity Sister     Diabetes Sister        SOCIAL HISTORY    Social History     Tobacco Use    Smoking status: Former Smoker     Packs/day: 0.25     Years: 33.00     Pack years: 8.25     Types: Cigarettes     Start date: 1985     Quit date: 2020     Years since quittin.7    Smokeless tobacco: Former User     Types: Snuff     Quit date: 1995   Vaping Use    Vaping Use: Never used   Substance Use Topics    Alcohol use: No     Alcohol/week: 0.0 standard drinks    Drug use: Not Currently     Types: Marijuana     Comment: Patient reports he quit using THC for 26 days on 2021       ALLERGIES    Allergies   Allergen Reactions    Levofloxacin Anaphylaxis     Patient reports needing epinephrine \"about 5 or 6 months ago\" for anaphylaxis (itching, hives, SOB/swelling) after receiving Levofloxacin. Previous report from 2012: Nausea/Vomiting    Lorazepam      Falls      Nsaids      CHF&CKD    Prozac [Fluoxetine Hcl] Other (See Comments)     Pt started with seizures after started taking.  Vancomycin Other (See Comments)     Itching, SOB, emesis upon infusion in ED 2019. Patient states he has had vancomycin \"a number of times\" before without issue. couldn't breath and talk, throat closed       MEDICATIONS    Current Outpatient Medications on File Prior to Encounter   Medication Sig Dispense Refill    nitroGLYCERIN (NITROSTAT) 0.4 MG SL tablet DISSOLVE 1 TAB UNDER TONGUE FOR CHEST PAIN - IF PAIN REMAINS AFTER 5 MIN, CALL 911 AND REPEAT DOSE.  MAX 3 TABS IN 15 MINUTES 25 tablet 2    cephALEXin (KEFLEX) 500 MG capsule Take 1 capsule by mouth 4 times daily for 7 days 28 capsule 0    doxycycline hyclate (VIBRA-TABS) 100 MG tablet Take 1 tablet by mouth 2 times daily for 7 days 14 tablet 0    pantoprazole (PROTONIX) 40 MG tablet Take 1 tablet by mouth every morning (before breakfast) 90 tablet 1    oxyCODONE HCl (OXY-IR) 10 MG immediate release tablet Take 1 tablet by mouth every 8 hours as needed for Pain for up to 30 days. 90 tablet 0    Insulin Syringe-Needle U-100 31G X 5/16\" 1 ML MISC Use to subcutaneously inject insulin three times daily 300 each 2    insulin regular human (HUMULIN R) 500 UNIT/ML concentrated injection vial Patient using 0.52ml with breakfast, 0.52ml with lunch and 0.45ml with dinner. 60 mL 3    escitalopram (LEXAPRO) 10 MG tablet Take 1 tablet by mouth daily 90 tablet 3    Continuous Blood Gluc Sensor (FREESTYLE CRISTINE 14 DAY SENSOR) MISC Use one sensor every 14 days 2 each 3    rosuvastatin (CRESTOR) 10 MG tablet Take 1 tablet by mouth nightly Stop pravastatin 90 tablet 3    Continuous Blood Gluc Sensor (FREESTYLE CRISTINE 2 SENSOR) [facility] [place] to apply a new sensor every 14 days. RX to cover voucher patient will bring in. 2 each 0    ammonium lactate (LAC-HYDRIN) 12 % cream Apply topically to dry skin BID. 1 Bottle 4    sulfacetamide (BLEPH-10) 10 % ophthalmic solution       mupirocin (BACTROBAN) 2 % ointment Apply topically 2 times daily on the affected area for 7-10 days. OK to substitute to cream 30 g 2    Gauze Pads & Dressings 4\"X4\" PADS Twice a day dressing of right leg wound 60 each 5    Gauze Bandages (ROLLED GAUZE BANDAGE 4\"X2. 5YD) MISC For twice a day dressing 60 each 5    PROAIR  (90 Base) MCG/ACT inhaler INHALE TWO PUFFS BY MOUTH EVERY 6 HOURS AS NEEDED FOR WHEEZING OR FOR SHORTNESS OF BREATH 8.5 g 3    amitriptyline (ELAVIL) 50 MG tablet Take 2 po qhs 60 tablet 5    nystatin (MYCOSTATIN) 517431 UNIT/GM powder Apply 2 times daily in the skin folds for several months 1 Bottle 3    tiZANidine (ZANAFLEX) 4 MG tablet TAKE ONE TABLET BY MOUTH EVERY 8 HOURS AS NEEDED FOR BACK PAIN 90 tablet 5    bumetanide (BUMEX) 1 MG tablet TAKE THREE TABLETS BY MOUTH TWICE A  tablet 2    spironolactone (ALDACTONE) 50 MG tablet Take 1 tablet by mouth daily 90 tablet 3    vitamin D3 (CHOLECALCIFEROL) 25 MCG (1000 UT) TABS tablet Take 1 tablet by mouth daily 90 tablet 1    ammonium lactate (LAC-HYDRIN) 12 % lotion Apply topically daily. For dry skin 1 Bottle 2    clobetasol (TEMOVATE) 0.05 % ointment Apply topically 2 times daily for psoriasis 1 Tube 3    ketoconazole (NIZORAL) 2 % cream Apply twice a day for yeast infection in the skin folds, for 4 weeks 1 Tube 3    albuterol (PROVENTIL) (2.5 MG/3ML) 0.083% nebulizer solution Take 3 mLs by nebulization every 6 hours as needed for Wheezing or Shortness of Breath 120 vial 3    Ferrous Sulfate (IRON) 325 (65 Fe) MG TABS Take 1 tablet by mouth daily 90 tablet 3    metoprolol tartrate (LOPRESSOR) 50 MG tablet Take 1 tablet by mouth 2 times daily 180 tablet 3    magnesium oxide (MAG-OX) 400 (240 Mg) MG tablet Take 1 tablet by mouth daily 90 tablet 3    lisinopril (PRINIVIL;ZESTRIL) 5 MG tablet Take 1 tablet by mouth daily . Discontinued Lisinopril  10 mg 90 tablet 3    fluticasone-salmeterol (ADVAIR HFA) 230-21 MCG/ACT inhaler Inhale 2 puffs into the lungs 2 times daily 12 g 11    tiotropium (SPIRIVA RESPIMAT) 2.5 MCG/ACT AERS inhaler Inhale 2 puffs into the lungs daily 12 g 11    venlafaxine (EFFEXOR XR) 75 MG extended release capsule Take 1 capsule by mouth daily Take with food 90 capsule 3    naloxone 4 MG/0.1ML LIQD nasal spray 1 spray by Nasal route as needed for Opioid Reversal Due to COPD and sleep apnea, this is a big recommendation for you 1 each 5    blood glucose monitor strips Test 3 times a day & as needed for symptoms of irregular blood glucose. Dispense sufficient amount for indicated testing frequency plus additional to accommodate PRN testing needs.  One touch Ultra blue 300 strip 0    Lancets MISC Use to check blood sugar three times daily along with when necessary due to symptoms. 300 each 2    clopidogrel (PLAVIX) 75 MG tablet TAKE ONE TABLET BY MOUTH DAILY 90 tablet 3    vitamin B-12 (CYANOCOBALAMIN) 500 MCG tablet Take 1 tablet by mouth daily 90 tablet 3    Oxygen Tubing MISC by Does not apply route DX COPD. chronic respiratory failure 1 each 0    Respiratory Therapy Supplies (NEBULIZER/TUBING/MOUTHPIECE) KIT Dx COPD needs nebulizer supplies 1 kit 11    ONE TOUCH ULTRASOFT LANCETS MISC Patient to test blood sugar up to 4 times daily. 300 each 3    Lancet Devices (LANCING DEVICE) MISC Provide patient with lancing device appropriate for his machine/lancing needles. 1 each 1    Lidocaine 4 % LOTN Apply topically      Spacer/Aero Chamber Mouthpiece MISC 1 each by Does not apply route once as needed (to be used with his inhalers) 1 each 0    metolazone (ZAROXOLYN) 2.5 MG tablet Take 2.5 mg by mouth three times a week MWF      Handicap Placard [facility] by Does not apply route Can't walk greater than 200 feet. Expires in 5 years. 1 each 0    fluticasone (CUTIVATE) 0.05 % cream Apply topically 2 times daily       fluticasone (FLONASE) 50 MCG/ACT nasal spray 2 sprays by Nasal route daily (Patient taking differently: 2 sprays by Nasal route daily as needed (sinus symptoms) ) 1 Bottle 3    Melatonin 10 MG TABS Take 10 mg by mouth nightly as needed (insomnia) 90 tablet 1    aspirin 81 MG EC tablet Take 81 mg by mouth daily. No current facility-administered medications on file prior to encounter.        REVIEW OF SYSTEMS    Constitutional: negative  Eyes: negative  Ears, nose, mouth, throat, and face: negative  Respiratory: negative  Cardiovascular: negative except for lower extremity edema  Gastrointestinal: negative  Genitourinary:negative  Integument/breast: negative except for right leg wound  Hematologic/lymphatic: negative  Musculoskeletal:negative  Neurological: negative except for paresthesia  Behavioral/Psych: negative  Endocrine: negative  Allergic/Immunologic: negative    Objective:      BP (!) 142/68   Pulse 91   Temp 98.2 °F (36.8 °C) (Tympanic)   Resp 24   Ht 6' (1.829 m)   Wt (!) 440 lb (199.6 kg)   BMI 59.67 kg/m²     Wt Readings from Last 3 Encounters:   08/03/21 (!) 440 lb (199.6 kg)   07/29/21 (!) 438 lb (198.7 kg)   07/20/21 (!) 427 lb (193.7 kg)       PHYSICAL EXAM    General Appearance: alert and oriented to person, place and time, well-developed and obese, in no acute distress  Skin: warm and dry, no rash or erythema, right lower leg ulcer   Head: normocephalic and atraumatic  Eyes: pupils equal, round, extraocular eye movements intact, and conjunctivae normal  Pulmonary/Chest: normal air movement, no respiratory distress  Extremities: no cyanosis and no clubbing. Bilateral lower leg edema    Musculoskeletal: no joint swelling, deformity or tenderness  Neurologic: gait, coordination normal and speech normal      Assessment:     Problem List Items Addressed This Visit     Lymphedema of both lower extremities    Relevant Orders    Initiate Outpatient Wound Care Protocol    Morbid obesity with BMI of 50.0-59.9, adult ([address]    Relevant Orders    Initiate Outpatient Wound Care Protocol    Non-pressure chronic ulcer of right lower leg, limited to breakdown of skin ([address] - Primary    Relevant Orders    Initiate Outpatient Wound Care Protocol    Stasis dermatitis of both legs    Relevant Orders    Initiate Outpatient Wound Care Protocol    Type 2 diabetes mellitus with diabetic polyneuropathy, with long-term current use of insulin ([address]    Relevant Orders    Initiate Outpatient Wound Care Protocol    Venous insufficiency of both lower extremities    Relevant Orders    Initiate Outpatient Wound Care Protocol           Procedure Note  Indications:  Based on my examination of this patient's wound(s)/ulcer(s) today, debridement is required to promote healing and evaluate the wound base.     Performed by: Consuelo Padron NICOLE KRAUSE CNP    Consent obtained:  Yes    Time out taken:  Yes    Pain Control: Anesthetic  Anesthetic: 4% Lidocaine Liquid Topical       Debridement:Excisional Debridement    Using curette the wound(s)/ulcer(s) was/were sharply debrided down through and including the removal of subcutaneous tissue. Devitalized Tissue Debrided:  fibrin, biofilm and slough    Pre Debridement Measurements:  Are located in the [place]  Documentation Flow Sheet    Wound/Ulcer #: 2    Post Debridement Measurements:  Wound/Ulcer Descriptions are Pre Debridement except measurements:    Wound 08/03/21 Leg Right;Posterior; Lateral;Lower wound #2 right lower leg posterior lateral  (Active)   Wound Image   08/03/21 1441   Wound Etiology Venous 08/03/21 1441   Dressing Status New drainage noted; Old drainage noted 08/03/21 1441   Wound Cleansed Cleansed with saline 08/03/21 1441   Wound Length (cm) 9 cm 08/03/21 1441   Wound Width (cm) 7.5 cm 08/03/21 1441   Wound Depth (cm) 0.1 cm 08/03/21 1441   Wound Surface Area (cm^2) 67.5 cm^2 08/03/21 1441   Wound Volume (cm^3) 6.75 cm^3 08/03/21 1441   Post-Procedure Length (cm) 9 cm 08/03/21 1441   Post-Procedure Width (cm) 7.5 cm 08/03/21 1441   Post-Procedure Depth (cm) 0.1 cm 08/03/21 1441   Post-Procedure Surface Area (cm^2) 67.5 cm^2 08/03/21 1441   Post-Procedure Volume (cm^3) 6.75 cm^3 08/03/21 1441   Wound Assessment Downing/red;Slough 08/03/21 1441   Drainage Amount Moderate 08/03/21 1441   Drainage Description Serosanguinous; Yellow 08/03/21 1441   Odor None 08/03/21 1441   Terrie-wound Assessment Maceration 08/03/21 1441   Margins Attached edges 08/03/21 1441   Number of days: 0          Percent of Wound(s)/Ulcer(s) Debrided: 50%    Total Surface Area Debrided:  33.75 sq cm     Diabetic/Pressure/Non Pressure Ulcers only:  Ulcer: Non-Pressure ulcer, fat layer exposed      Estimated Blood Loss:  Minimal    Hemostasis Achieved:  by pressure    Procedural Pain:  4  / 10     Post Procedural Pain:  4 / 10     Response to treatment:  Well tolerated by patient., With complaints of pain. Plan:     Treatment Note please see attached Discharge Instructions    Written patient dismissal instructions given to patient and signed by patient or POA. Discharge Instructions         [address] [facility] and [facility]      Visit  Discharge Instructions / Physician Orders  [address]: none     SUPPLIES ORDERED THRU: NA     Wound Location:  right posterior lower leg     Cleanse with: Liquid antibacterial soap and water, rinse well      Dressing Orders:  Primary dressing   Silvercel, zinc unna to right lower leg,                                                                       circaid to left lower leg,     Frequency:  Keep dry and in place. Additional Orders: Increase protein to diet (meat, cheese, eggs, fish, peanut butter, nuts and beans)  Multivitamin daily    MY CHART []     Smart Device  []     HYPERBARIC TREATMENT-                TREATMENT #     Your next appointment with the [address] [place] [address] is in 1 week                                                                                                   ROOM TYPE   [] CHAIR     [] BED   [] EITHER        TIME [] 45 MIN     [] 60 MIN     (Please note your next appointment above and if you are unable to keep, kindly give a 24 hour notice. Thank you.)     If you experience any of the following, please call the [address] [place] [address] during business hours:  [phone]     * Increase in Pain  * Temperature over 101  * Increase in drainage from your wound  * Drainage with a foul odor  * Bleeding  * Increase in swelling  * Need for compression bandage changes due to slippage, breakthrough drainage. If you need medical attention outside of the business hours of the [address] [place] [address] please contact your PCP or go to the nearest emergency room.      The information contained in the After Visit Summary has been reviewed with me, the patient and/or responsible adult, by my health care provider(s). I had the opportunity to ask questions regarding this information.  I have elected to receive;      []After Visit Summary  [x]Comprehensive Discharge Instruction      Patient signature______________________________________Date:________  Electronically signed by Fermín Harper RN on 8/3/2021 at 2:44 PM  Electronically signed by NICOLE Yeboah CNP on 8/3/2021 at 2:53 PM                Electronically signed by NICOLE Yeboah CNP on 8/3/2021 at 2:55 PM

## 2021-08-09 ENCOUNTER — TELEPHONE (OUTPATIENT)
Dept: PHARMACY | Age: 56
End: 2021-08-09

## 2021-08-09 ENCOUNTER — HOSPITAL ENCOUNTER (OUTPATIENT)
Dept: WOUND CARE | Age: 56
Discharge: HOME | End: 2021-08-09
Payer: COMMERCIAL

## 2021-08-09 VITALS
SYSTOLIC BLOOD PRESSURE: 143 MMHG | TEMPERATURE: 96.6 F | BODY MASS INDEX: 59.67 KG/M2 | RESPIRATION RATE: 20 BRPM | HEART RATE: 97 BPM | WEIGHT: 315 LBS | DIASTOLIC BLOOD PRESSURE: 69 MMHG

## 2021-08-09 DIAGNOSIS — E11.42 TYPE 2 DIABETES MELLITUS WITH DIABETIC POLYNEUROPATHY, WITH LONG-TERM CURRENT USE OF INSULIN (HCC): ICD-10-CM

## 2021-08-09 DIAGNOSIS — I89.0 LYMPHEDEMA OF BOTH LOWER EXTREMITIES: ICD-10-CM

## 2021-08-09 DIAGNOSIS — I87.2 STASIS DERMATITIS OF BOTH LEGS: ICD-10-CM

## 2021-08-09 DIAGNOSIS — Z79.4 TYPE 2 DIABETES MELLITUS WITH DIABETIC POLYNEUROPATHY, WITH LONG-TERM CURRENT USE OF INSULIN (HCC): ICD-10-CM

## 2021-08-09 DIAGNOSIS — I87.2 VENOUS INSUFFICIENCY OF BOTH LOWER EXTREMITIES: ICD-10-CM

## 2021-08-09 DIAGNOSIS — L97.911 NON-PRESSURE CHRONIC ULCER OF RIGHT LOWER LEG, LIMITED TO BREAKDOWN OF SKIN (HCC): Primary | ICD-10-CM

## 2021-08-09 DIAGNOSIS — E66.01 MORBID OBESITY WITH BMI OF 50.0-59.9, ADULT (HCC): ICD-10-CM

## 2021-08-09 PROCEDURE — 99213 OFFICE O/P EST LOW 20 MIN: CPT | Performed by: NURSE PRACTITIONER

## 2021-08-09 PROCEDURE — 29580 STRAPPING UNNA BOOT: CPT

## 2021-08-09 RX ORDER — LIDOCAINE HYDROCHLORIDE 40 MG/ML
SOLUTION TOPICAL ONCE
Status: CANCELLED | OUTPATIENT
Start: 2021-08-09 | End: 2021-08-09

## 2021-08-09 RX ORDER — LIDOCAINE HYDROCHLORIDE 40 MG/ML
SOLUTION TOPICAL ONCE
Status: DISCONTINUED | OUTPATIENT
Start: 2021-08-09 | End: 2021-08-10 | Stop reason: HOSPADM

## 2021-08-09 ASSESSMENT — PAIN SCALES - GENERAL: PAINLEVEL_OUTOF10: 0

## 2021-08-09 NOTE — PROGRESS NOTES
Mindy Rule Application   Below Knee    NAME:  Willian Ac. YOB: 1965  MEDICAL RECORD NUMBER:  [id number]  DATE:  8/9/2021     [x] Removed old Rodolfo Anca boot if indicated and wash leg with mild soap and water.  [x] Applied moisturizing agent to dry skin as needed.  [x] Appied primary and secondary dressing as ordered     [x] Applied Unna roll from toes to knee overlapping each time.  [x] Applied ace wrap or coban from toes to below the knee.  [x] Secured with tape and/or metal clips covered with tape.  [x] Instructed patient/caregiver to keep dressing dry and intact. DO NOT REMOVE DRESSING.  [x] Instructed pt/family/caregiver to report excessive draining, loose bandage, wet dressing, severe pain or tingling in toes.  [x] Applied Mindy Rule dressing below the knee to Right lower leg(s)        Unna Boot(s) were applied per  Guidelines.      Electronically signed by Gary Belcher RN on 8/9/2021 at 9:54 AM

## 2021-08-09 NOTE — PROGRESS NOTES
foramina narrowing.   L4-L5: There is broad-based protrud    Syncope 2017    Tinnitus of both ears 1/10/2017    Per ENT    Type 2 diabetes mellitus with stage 3 chronic kidney disease, with long-term current use of insulin ([facility] [address] 2016    due to underlying condition with hyperosmolarity without coma    Type II or unspecified type diabetes mellitus without mention of complication, not stated as uncontrolled     uncontrolled    Vitamin D deficiency     Wears glasses     for reading       PAST SURGICAL HISTORY    Past Surgical History:   Procedure Laterality Date    BACK SURGERY   (x 4) ,.2011.2012     Dr Delio Singh last [address]  2018    no stenting    COLONOSCOPY  11/3/2015    hemorrhoids, poor prep, not done    COLONOSCOPY      COLONOSCOPY N/A 2021    COLONOSCOPY POLYPECTOMY SNARE/COLD BIOPSY/HOT BIOPSY/CLIP APPLICATION X1 performed by Figueroa Ibarra MD at [address] [facility]  March 2013    x 1    HAND TENDON SURGERY Left     thumb tendon repair    INTRACAPSULAR CATARACT EXTRACTION Right 2019    EYE CATARACT EMULSIFICATION IOL IMPLANT performed by Jacquie Dewitt MD at [address] [facility] Left 2020    EYE CATARACT EMULSIFICATION IOL IMPLANT performed by Jacquie Dewitt MD at [address] [facility] [address] ARTHROSCOPY Left     NERVE BLOCK  31-15    TENS unit    [place] ESOPHAGOGASTRODUODENOSCOPY TRANSORAL DIAGNOSTIC N/A 2018    EGD ESOPHAGOGASTRODUODENOSCOPY performed by Figueroa Ibarra MD at [address] [facility] TYMPANOMASTOIDECTOMY Bilateral 2012    Dr Escobar Mate History   Problem Relation Age of Onset    Heart Disease Mother          age 64 from MI   Unk [place] High Blood Pressure Mother     Diabetes Mother     High Blood Pressure Father          age 80 from CKD and Lung Fibrosis    Kidney Disease Father     Heart Disease Sister  Heart Attack Sister     Obesity Sister     Diabetes Sister        SOCIAL HISTORY    Social History     Tobacco Use    Smoking status: Former Smoker     Packs/day: 0.25     Years: 33.00     Pack years: 8.25     Types: Cigarettes     Start date: 1985     Quit date: 2020     Years since quittin.7    Smokeless tobacco: Former User     Types: Snuff     Quit date: 1995   Vaping Use    Vaping Use: Never used   Substance Use Topics    Alcohol use: No     Alcohol/week: 0.0 standard drinks    Drug use: Not Currently     Types: Marijuana     Comment: Patient reports he quit using THC for 26 days on 2021       ALLERGIES    Allergies   Allergen Reactions    Levofloxacin Anaphylaxis     Patient reports needing epinephrine \"about 5 or 6 months ago\" for anaphylaxis (itching, hives, SOB/swelling) after receiving Levofloxacin. Previous report from 2012: Nausea/Vomiting    Lorazepam      Falls      Nsaids      CHF&CKD    Prozac [Fluoxetine Hcl] Other (See Comments)     Pt started with seizures after started taking.  Vancomycin Other (See Comments)     Itching, SOB, emesis upon infusion in ED 2019. Patient states he has had vancomycin \"a number of times\" before without issue. couldn't breath and talk, throat closed       MEDICATIONS    Current Outpatient Medications on File Prior to Encounter   Medication Sig Dispense Refill    nitroGLYCERIN (NITROSTAT) 0.4 MG SL tablet DISSOLVE 1 TAB UNDER TONGUE FOR CHEST PAIN - IF PAIN REMAINS AFTER 5 MIN, CALL 911 AND REPEAT DOSE. MAX 3 TABS IN 15 MINUTES 25 tablet 2    pantoprazole (PROTONIX) 40 MG tablet Take 1 tablet by mouth every morning (before breakfast) 90 tablet 1    oxyCODONE HCl (OXY-IR) 10 MG immediate release tablet Take 1 tablet by mouth every 8 hours as needed for Pain for up to 30 days.  90 tablet 0    Insulin Syringe-Needle U-100 31G X 5/16\" 1 ML MISC Use to subcutaneously inject insulin three times daily 300 each 2    insulin regular human (HUMULIN R) 500 UNIT/ML concentrated injection vial Patient using 0.52ml with breakfast, 0.52ml with lunch and 0.45ml with dinner. 60 mL 3    escitalopram (LEXAPRO) 10 MG tablet Take 1 tablet by mouth daily 90 tablet 3    Continuous Blood Gluc Sensor (FREESTYLE CRISTINE 14 DAY SENSOR) MISC Use one sensor every 14 days 2 each 3    rosuvastatin (CRESTOR) 10 MG tablet Take 1 tablet by mouth nightly Stop pravastatin 90 tablet 3    Continuous Blood Gluc Sensor (FREESTYLE CRISTINE 2 SENSOR) [facility] Patient to apply a new sensor every 14 days. RX to cover voucher patient will bring in. 2 each 0    ammonium lactate (LAC-HYDRIN) 12 % cream Apply topically to dry skin BID. 1 Bottle 4    sulfacetamide (BLEPH-10) 10 % ophthalmic solution       mupirocin (BACTROBAN) 2 % ointment Apply topically 2 times daily on the affected area for 7-10 days. OK to substitute to cream 30 g 2    Gauze Pads & Dressings 4\"X4\" PADS Twice a day dressing of right leg wound 60 each 5    Gauze Bandages (ROLLED GAUZE BANDAGE 4\"X2. 5YD) MISC For twice a day dressing 60 each 5    PROAIR  (90 Base) MCG/ACT inhaler INHALE TWO PUFFS BY MOUTH EVERY 6 HOURS AS NEEDED FOR WHEEZING OR FOR SHORTNESS OF BREATH 8.5 g 3    amitriptyline (ELAVIL) 50 MG tablet Take 2 po qhs 60 tablet 5    nystatin (MYCOSTATIN) 260373 UNIT/GM powder Apply 2 times daily in the skin folds for several months 1 Bottle 3    tiZANidine (ZANAFLEX) 4 MG tablet TAKE ONE TABLET BY MOUTH EVERY 8 HOURS AS NEEDED FOR BACK PAIN 90 tablet 5    bumetanide (BUMEX) 1 MG tablet TAKE THREE TABLETS BY MOUTH TWICE A  tablet 2    spironolactone (ALDACTONE) 50 MG tablet Take 1 tablet by mouth daily 90 tablet 3    vitamin D3 (CHOLECALCIFEROL) 25 MCG (1000 UT) TABS tablet Take 1 tablet by mouth daily 90 tablet 1    ammonium lactate (LAC-HYDRIN) 12 % lotion Apply topically daily.  For dry skin 1 Bottle 2    clobetasol (TEMOVATE) 0.05 % ointment Apply topically 2 times ONE TOUCH ULTRASOFT LANCETS MISC Patient to test blood sugar up to 4 times daily. 300 each 3    Lancet Devices (LANCING DEVICE) MISC Provide patient with lancing device appropriate for his machine/lancing needles. 1 each 1    Lidocaine 4 % LOTN Apply topically      Spacer/Aero Chamber Mouthpiece MISC 1 each by Does not apply route once as needed (to be used with his inhalers) 1 each 0    metolazone (ZAROXOLYN) 2.5 MG tablet Take 2.5 mg by mouth three times a week MWF      Handicap Placard [facility] by Does not apply route Can't walk greater than 200 feet. Expires in 5 years. 1 each 0    fluticasone (CUTIVATE) 0.05 % cream Apply topically 2 times daily       fluticasone (FLONASE) 50 MCG/ACT nasal spray 2 sprays by Nasal route daily (Patient taking differently: 2 sprays by Nasal route daily as needed (sinus symptoms) ) 1 Bottle 3    Melatonin 10 MG TABS Take 10 mg by mouth nightly as needed (insomnia) 90 tablet 1    aspirin 81 MG EC tablet Take 81 mg by mouth daily. No current facility-administered medications on file prior to encounter.        REVIEW OF SYSTEMS    Constitutional: negative  Eyes: negative  Ears, nose, mouth, throat, and face: negative  Respiratory: negative  Cardiovascular: negative except for lower extremity edema  Gastrointestinal: negative  Genitourinary:negative  Integument/breast: negative  Hematologic/lymphatic: negative  Musculoskeletal:negative  Neurological: negative except for paresthesia  Behavioral/Psych: negative  Endocrine: negative  Allergic/Immunologic: negative    Objective:      BP (!) 143/69   Pulse 97   Temp 96.6 °F (35.9 °C) (Tympanic)   Resp 20   Wt (!) 440 lb (199.6 kg)   BMI 59.67 kg/m²     Wt Readings from Last 3 Encounters:   08/09/21 (!) 440 lb (199.6 kg)   08/03/21 (!) 440 lb (199.6 kg)   07/29/21 (!) 438 lb (198.7 kg)       PHYSICAL EXAM    General Appearance: alert and oriented to person, place and time, well-developed and well-nourished, in no acute distress  Skin: warm and dry, no rash or erythema  Head: normocephalic and atraumatic  Eyes: pupils equal, round, extraocular eye movements intact, and conjunctivae normal  Pulmonary/Chest: normal air movement, no respiratory distress  Extremities: no cyanosis and no clubbing or edema   Musculoskeletal: no joint swelling, deformity or tenderness  Neurologic: gait, coordination normal and speech normal      Assessment:     Problem List Items Addressed This Visit     Lymphedema of both lower extremities    Relevant Medications    lidocaine (XYLOCAINE) 4 % external solution (Start on 8/9/2021  9:45 AM)    Other Relevant Orders    Initiate Outpatient Wound Care Protocol    Morbid obesity with BMI of 50.0-59.9, adult ([facility])    Relevant Medications    lidocaine (XYLOCAINE) 4 % external solution (Start on 8/9/2021  9:45 AM)    Other Relevant Orders    Initiate Outpatient Wound Care Protocol    Non-pressure chronic ulcer of right lower leg, limited to breakdown of skin ([facility]) - Primary    Relevant Medications    lidocaine (XYLOCAINE) 4 % external solution (Start on 8/9/2021  9:45 AM)    Other Relevant Orders    Initiate Outpatient Wound Care Protocol    Stasis dermatitis of both legs    Relevant Medications    lidocaine (XYLOCAINE) 4 % external solution (Start on 8/9/2021  9:45 AM)    Other Relevant Orders    Initiate Outpatient Wound Care Protocol    Type 2 diabetes mellitus with diabetic polyneuropathy, with long-term current use of insulin ([facility])    Relevant Medications    lidocaine (XYLOCAINE) 4 % external solution (Start on 8/9/2021  9:45 AM)    Other Relevant Orders    Initiate Outpatient Wound Care Protocol    Venous insufficiency of both lower extremities    Relevant Medications    lidocaine (XYLOCAINE) 4 % external solution (Start on 8/9/2021  9:45 AM)    Other Relevant Orders    Initiate Outpatient Wound Care Protocol           Procedure Note  Indications:  Based on my examination of this patient's wound(s)/ulcer(s) today, debridement is not required to promote healing and evaluate the wound base. Wound Measurements:  Wound/Ulcer Descriptions are Pre Debridement except measurements:    Wound 08/03/21 Leg Right;Posterior; Lateral;Lower wound #2 right lower leg posterior lateral  (Active)   Wound Image   08/09/21 0927   Wound Etiology Venous 08/03/21 1441   Dressing Status New drainage noted; Old drainage noted 08/03/21 1441   Wound Cleansed Cleansed with saline 08/03/21 1441   Wound Length (cm) 0 cm 08/09/21 0927   Wound Width (cm) 0 cm 08/09/21 0927   Wound Depth (cm) 0 cm 08/09/21 0927   Wound Surface Area (cm^2) 0 cm^2 08/09/21 0927   Change in Wound Size % (l*w) 100 08/09/21 0927   Wound Volume (cm^3) 0 cm^3 08/09/21 0927   Wound Healing % 100 08/09/21 0927   Post-Procedure Length (cm) 0 cm 08/09/21 0927   Post-Procedure Width (cm) 0 cm 08/09/21 0927   Post-Procedure Depth (cm) 0 cm 08/09/21 0927   Post-Procedure Surface Area (cm^2) 0 cm^2 08/09/21 0927   Post-Procedure Volume (cm^3) 0 cm^3 08/09/21 0927   Wound Assessment [place]/red;Slough 08/03/21 1441   Drainage Amount Moderate 08/03/21 1441   Drainage Description Serosanguinous; Yellow 08/03/21 1441   Odor None 08/03/21 1441   Terrie-wound Assessment Maceration 08/03/21 1441   Margins Attached edges 08/03/21 1441   Number of days: 5          Wound is healed. Plan:     Treatment Note please see attached Discharge Instructions    Written patient dismissal instructions given to patient and signed by patient or POA.          Discharge Instructions         [address] [facility]                                 Visit  Discharge Instructions / Physician Orders  DATE:8/9/21     Home Care: none     SUPPLIES ORDERED THRU: NA     Wound Location:  right posterior lower leg     Cleanse with: Liquid antibacterial soap and water, rinse well      Dressing Orders:  Primary dressing   zinc unna to right lower leg, circaid to left lower leg,     Frequency:  Keep dry and in place.   Additional Orders: Increase protein to diet (meat, cheese, eggs, fish, peanut butter, nuts and beans)  Multivitamin daily     MY CHART []?     Smart Device  []?      HYPERBARIC TREATMENT-                TREATMENT #     Your next appointment with the [address] [place] [address] is in 1 week                                                                                                   ROOM TYPE   []? CHAIR     []? BED   []? EITHER        TIME []? 45 MIN     []? 60 MIN     (Please note your next appointment above and if you are unable to keep, kindly give a 24 hour notice. Thank you.)     If you experience any of the following, please call the [address] [place] [address] during business hours:  [phone]     * Increase in Pain  * Temperature over 101  * Increase in drainage from your wound  * Drainage with a foul odor  * Bleeding  * Increase in swelling  * Need for compression bandage changes due to slippage, breakthrough drainage.     If you need medical attention outside of the business hours of the [address] [place] [address] please contact your PCP or go to the nearest emergency room. The information contained in the After Visit Summary has been reviewed with me, the patient and/or responsible adult, by my health care provider(s). I had the opportunity to ask questions regarding this information. I have elected to receive;      []? After Visit Summary  [x]? Comprehensive Discharge Instruction        Patient signature______________________________________Date:________  Electronically signed by Beth Peterson RN on 8/9/2021 at 9:37 AM   Electronically signed by NICOLE Berry CNP on 8/9/2021 at 9:37 AM          Electronically signed by NICOLE Berry CNP on 8/9/2021 at 9:37 AM

## 2021-08-10 ENCOUNTER — PATIENT MESSAGE (OUTPATIENT)
Dept: FAMILY MEDICINE CLINIC | Age: 56
End: 2021-08-10

## 2021-08-10 ENCOUNTER — TELEPHONE (OUTPATIENT)
Dept: PHARMACY | Age: 56
End: 2021-08-10

## 2021-08-10 DIAGNOSIS — I25.118 CORONARY ARTERY DISEASE OF NATIVE ARTERY OF NATIVE HEART WITH STABLE ANGINA PECTORIS (HCC): ICD-10-CM

## 2021-08-10 NOTE — TELEPHONE ENCOUNTER
Patient called to report several hypoglcyemic episodes recently. States he woke up about 2:30am with a blood sugar of 40. Patient ate and BS came right up. States his daughter started him on a plant based diet about 2 weeks ago in effort to loose weight. Patient states [place] has to too something\"  Patient states he has basically cut out all dairy and red meat. Patient states was very strict first couple days but due to [facility] has started to slowly add in some carbohydrates. Patient does not have a scale at home to weigh himself but states his last appt he was 440lb. Indicates that his close are fitting differently and pants are almost falling off him. For the most part patient has been compliant with his insulin dosing as instructed but admits he has reduced his insulin dose a few times when blood sugar was on lower end. Patient states all but one hypoglycemic episode has happened overnight or early morning. Other than those episodes fasting blood sugars have all been under 125. Before lunch has been running 160-180 and before dinner running 150-170 per patient report. Patient has appt with endocrinology 8/10. Instructed patient to lower morning insulin to 0.38 but if he starts eating more carbs and fasting blood sugars go above 130 will increase to 0.4. Patient has appt scheduled in about 2 weeks with our service but with any further hypoglycemic episodes will possibly schedule sooner. Yesenia Rizvi [facility],Pharm. D,, BCPS, CACP  8/9/2021  10:02 PM

## 2021-08-10 NOTE — TELEPHONE ENCOUNTER
Called patient to check in on BS and appt with endocrinology today. Patient indicates he saw Dr Katie Ruiz today and was told he is doing well and to continue to reduce his insulin dose as needed based on blood sugar. Patient reports BS of 126 at 9am, he took reduced dose of .5 insulin and he ate oatmeal with fruit for breakfast prior to 9:45 appt with Dr. Katie Ruiz. At 1pm patient reported BS of 52. He drank some OJ and 1/2 pb sandwich and laid down. Patient did not recheck BS. Encouraged him to always recheck BS with hypglycemia. Patient checks blood sugar while on phone and reports 122. Patient will continue to reduce insulin for low blood sugar and call with any further hypoglycemia. Yesenia Rizvi [facility],Pharm. D,, BCPS, CACP  8/10/2021  4:55 PM

## 2021-08-11 RX ORDER — CLOPIDOGREL BISULFATE 75 MG/1
75 TABLET ORAL DAILY
Qty: 90 TABLET | Refills: 3 | Status: SHIPPED | OUTPATIENT
Start: 2021-08-11

## 2021-08-11 NOTE — TELEPHONE ENCOUNTER
From: Annie Watt.   To: Terri Olivia MD  Sent: 8/10/2021 8:41 PM EDT  Subject: Non-Urgent Medical Question    Will you please refill my clopidogrel 75mg tablet thank you so much

## 2021-08-12 ENCOUNTER — PATIENT MESSAGE (OUTPATIENT)
Dept: FAMILY MEDICINE CLINIC | Age: 56
End: 2021-08-12

## 2021-08-12 DIAGNOSIS — M51.36 DDD (DEGENERATIVE DISC DISEASE), LUMBAR: ICD-10-CM

## 2021-08-12 DIAGNOSIS — M96.1 POSTLAMINECTOMY SYNDROME OF LUMBAR REGION: ICD-10-CM

## 2021-08-12 DIAGNOSIS — M54.42 CHRONIC MIDLINE LOW BACK PAIN WITH LEFT-SIDED SCIATICA: ICD-10-CM

## 2021-08-12 DIAGNOSIS — G89.29 CHRONIC MIDLINE LOW BACK PAIN WITH LEFT-SIDED SCIATICA: ICD-10-CM

## 2021-08-12 DIAGNOSIS — M48.061 SPINAL STENOSIS OF LUMBAR REGION WITHOUT NEUROGENIC CLAUDICATION: ICD-10-CM

## 2021-08-12 RX ORDER — OXYCODONE HYDROCHLORIDE 10 MG/1
10 TABLET ORAL EVERY 8 HOURS PRN
Qty: 90 TABLET | Refills: 0 | Status: SHIPPED | OUTPATIENT
Start: 2021-08-12 | End: 2021-09-09 | Stop reason: SDUPTHER

## 2021-08-12 NOTE — TELEPHONE ENCOUNTER
From: Liana Booth.   To: Anai Cabello MD  Sent: 8/12/2021 9:19 AM EDT  Subject: Non-Urgent Medical Question    Will you please refill my pain meds for me oxyCODONE HCL 10 mg one ever 8hours I will be out of them Saturday night thank you so much and have a great day

## 2021-08-13 ENCOUNTER — OFFICE VISIT (OUTPATIENT)
Dept: NEUROLOGY | Age: 56
End: 2021-08-13
Payer: COMMERCIAL

## 2021-08-13 VITALS
HEART RATE: 72 BPM | TEMPERATURE: 97.2 F | BODY MASS INDEX: 58.05 KG/M2 | DIASTOLIC BLOOD PRESSURE: 82 MMHG | HEIGHT: 73 IN | SYSTOLIC BLOOD PRESSURE: 134 MMHG

## 2021-08-13 DIAGNOSIS — G89.29 CHRONIC LOW BACK PAIN WITH SCIATICA, SCIATICA LATERALITY UNSPECIFIED, UNSPECIFIED BACK PAIN LATERALITY: ICD-10-CM

## 2021-08-13 DIAGNOSIS — R56.9 SEIZURES (HCC): ICD-10-CM

## 2021-08-13 DIAGNOSIS — M54.40 CHRONIC LOW BACK PAIN WITH SCIATICA, SCIATICA LATERALITY UNSPECIFIED, UNSPECIFIED BACK PAIN LATERALITY: ICD-10-CM

## 2021-08-13 DIAGNOSIS — G43.009 MIGRAINE WITHOUT AURA AND WITHOUT STATUS MIGRAINOSUS, NOT INTRACTABLE: ICD-10-CM

## 2021-08-13 DIAGNOSIS — E08.42 DIABETIC POLYNEUROPATHY ASSOCIATED WITH DIABETES MELLITUS DUE TO UNDERLYING CONDITION (HCC): Primary | ICD-10-CM

## 2021-08-13 PROCEDURE — G8427 DOCREV CUR MEDS BY ELIG CLIN: HCPCS | Performed by: PSYCHIATRY & NEUROLOGY

## 2021-08-13 PROCEDURE — 2022F DILAT RTA XM EVC RTNOPTHY: CPT | Performed by: PSYCHIATRY & NEUROLOGY

## 2021-08-13 PROCEDURE — 1036F TOBACCO NON-USER: CPT | Performed by: PSYCHIATRY & NEUROLOGY

## 2021-08-13 PROCEDURE — G8417 CALC BMI ABV UP PARAM F/U: HCPCS | Performed by: PSYCHIATRY & NEUROLOGY

## 2021-08-13 PROCEDURE — 99214 OFFICE O/P EST MOD 30 MIN: CPT | Performed by: PSYCHIATRY & NEUROLOGY

## 2021-08-13 PROCEDURE — 3017F COLORECTAL CA SCREEN DOC REV: CPT | Performed by: PSYCHIATRY & NEUROLOGY

## 2021-08-13 RX ORDER — LATANOPROST 50 UG/ML
1 SOLUTION/ DROPS OPHTHALMIC NIGHTLY
COMMUNITY

## 2021-08-13 RX ORDER — BUTALBITAL, ACETAMINOPHEN AND CAFFEINE 50; 325; 40 MG/1; MG/1; MG/1
1 TABLET ORAL EVERY 8 HOURS PRN
Qty: 60 TABLET | Refills: 1 | Status: SHIPPED | OUTPATIENT
Start: 2021-08-13 | End: 2021-11-11 | Stop reason: SDUPTHER

## 2021-08-13 RX ORDER — GABAPENTIN 300 MG/1
CAPSULE ORAL
Qty: 60 CAPSULE | Refills: 2 | Status: SHIPPED | OUTPATIENT
Start: 2021-08-13 | End: 2021-10-27

## 2021-08-13 ASSESSMENT — ENCOUNTER SYMPTOMS
BACK PAIN: 1
SHORTNESS OF BREATH: 1
COUGH: 1
DIARRHEA: 1

## 2021-08-13 NOTE — PROGRESS NOTES
Subjective:      Patient ID: Sonja Hyde. is a 63 y.o. male. HPI  Active problem chronic daily headaches with painful diabetic neuropathy readjusting elavil as headache prophylactic and neuropathic pain . Provoked seizures in 2019 attributed to prozac and chantixx none since medication was stopped . Scalp paresthesias which are benign along with tinnitus attributed to sensorineural hearing loss . The condition is his wife report that he could not tolerate higher 100 mg po qhs elavil dose having hallucinations along with daytime sleepiness with dosage being reduced to 50 mg po qhs tolerating this well . With lower elavil dosage he is having more nerve pain in legs feeling like hot rocks at grade 8 over 10 . He is having headaches three times per week over right frontal head going to occipital head of grade 7 over 10 lasting one hour . He has had no seizures since last seen having had three seizures altogether . He has morbid obesity and COPD getting short winded on walking short distances using wheel chair for longer distances. There will be leg giveway although bigger issue will be shortness of breath . He has diabetes mellitus with neuropathy with numbness to mid lower legs . There is chronic low back pain with baseline aching stabbing at grade 8 over 10 more with activity with radiation down left leg . He is on oxycodone qid through PMD . He has had four low back surgeries with prior pain clinic evaluation gettig epidurals not wanting radiofrequency. There will be numbness of fingers . He reports pain control is adequate. He has sleep apnea using nasal CPAP on nightly basis . There are mild memory complaints . Significant medications elavil 50 mg po qhs, [id number] mg po qid  . Testing carotid US nonstenotic plaque formation , May 2017 . Cardiac 2 D echo normal LVF , EF 55-60% . Enlarged left atrium . Mild MR and TR , August 2017 . Head CT nomral brain , June 2017 .  EEG normal , July 2019      Past of left side     Mixed conductive and sensorineural hearing loss of both ears 1/10/2017    Per ENT    Mixed type COPD (chronic obstructive pulmonary disease) ([address]     On home O2, multiple inhlaers, nebulizer    Moderate recurrent major depression ([address] 10/2/2016    Morbid obesity with BMI of 45.0-49.9, adult ([address] 6/16/2015    On home oxygen therapy     3 Lpm prn    Open wound of groin 12/19/2018    healed     BARBY on CPAP     Osteoarthritis     Otitis externa of left ear     Pancreatitis chronic     Persistent depressive disorder 11/19/2019    Renal insufficiency     proteinuria    Severe depression ([address] 9/25/2013    Spinal stenosis of lumbar region without neurogenic claudication 1/6/2016    MRI lumbar 12/30/15 L3-L4: There is broad-based bulging disc which appears protruding left laterally causing flattening of the ventral thecal sac. In addition, there is facet arthropathy with mild hypertrophic changes.  There is borderline central canal stenosis with  evidence of moderate left neural foraminal narrowing and mild right neural foramina narrowing.   L4-L5: There is broad-based protrud    Syncope 4/28/2017    Tinnitus of both ears 1/10/2017    Per ENT    Type 2 diabetes mellitus with stage 3 chronic kidney disease, with long-term current use of insulin ([address] 12/26/2016    due to underlying condition with hyperosmolarity without coma    Type II or unspecified type diabetes mellitus without mention of complication, not stated as uncontrolled     uncontrolled    Vitamin D deficiency     Wears glasses     for reading       Past Surgical History:   Procedure Laterality Date    BACK SURGERY   (x 4) 2000,.12/2011.2/2012     Dr Fabio Arellano last 2 surg    CARDIAC CATHETERIZATION  04/23/2018    no stenting    COLONOSCOPY  11/3/2015    hemorrhoids, poor prep, not done    COLONOSCOPY  2013    COLONOSCOPY N/A 4/12/2021    COLONOSCOPY POLYPECTOMY SNARE/COLD BIOPSY/HOT BIOPSY/CLIP APPLICATION X1 performed by Ave Larson MD at [address] [facility]  March 2013    x 1    HAND TENDON SURGERY Left     thumb tendon repair    INTRACAPSULAR CATARACT EXTRACTION Right 2019    EYE CATARACT EMULSIFICATION IOL IMPLANT performed by Yina Pantoja MD at [place] [address] Left 2020    EYE CATARACT EMULSIFICATION IOL IMPLANT performed by Yina Pantoja MD at [address] [facility] ARTHROSCOPY Left     NERVE BLOCK  07-31-15    TENS unit    NH ESOPHAGOGASTRODUODENOSCOPY TRANSORAL DIAGNOSTIC N/A 2018    EGD ESOPHAGOGASTRODUODENOSCOPY performed by Ave Larson MD at [address] [facility] TYMPANOMASTOIDECTOMY Bilateral 2012    Dr Jamie Talavera       Family History   Problem Relation Age of Onset    Heart Disease Mother          age 64 from [place]   [employer] High Blood Pressure Mother     Diabetes Mother     High Blood Pressure Father          age 80 from CKD and Lung Fibrosis    Kidney Disease Father     Heart Disease Sister     Heart Attack Sister     Obesity Sister     Diabetes Sister        Social History     Socioeconomic History    Marital status:      Spouse name: Galen Christianson Number of children: 11    Years of education: None    Highest education level: None   Occupational History    Occupation: disability     Comment: unemployed   Tobacco Use    Smoking status: Former Smoker     Packs/day: 0.25     Years: 33.00     Pack years: 8.25     Types: Cigarettes     Start date: 1985     Quit date: 2020     Years since quittin.7    Smokeless tobacco: Former User     Types: Snuff     Quit date: 1995   Vaping Use    Vaping Use: Never used   Substance and Sexual Activity    Alcohol use: No     Alcohol/week: 0.0 standard drinks    Drug use: Not Currently     Types: Marijuana     Comment: Patient reports he quit using THC for 26 days on 2021    Sexual activity: Not Currently   Other Topics Concern  None   Social History Narrative    Middle of possible separation 6/22/2016          Social Determinants of Health     Financial Resource Strain: Low Risk     Difficulty of Paying Living Expenses: Not hard at all   Food Insecurity: No Food Insecurity    Worried About Running Out of Food in the Last Year: Never true    Basil of Food in the Last Year: Never true   Transportation Needs: No Transportation Needs    Lack of Transportation (Medical): No    Lack of Transportation (Non-Medical): No   Physical Activity:     Days of Exercise per Week:     Minutes of Exercise per Session:    Stress:     Feeling of Stress :    Social Connections:     Frequency of Communication with Friends and Family:     Frequency of Social Gatherings with Friends and Family:     Attends Orthodox Services:     Active Member of Clubs or Organizations:     Attends Club or Organization Meetings:     Marital Status:    Intimate Partner Violence:     Fear of Current or Ex-Partner:     Emotionally Abused:     Physically Abused:     Sexually Abused:        Current Outpatient Medications   Medication Sig Dispense Refill    latanoprost (XALATAN) 0.005 % ophthalmic solution 1 drop nightly      gabapentin (NEURONTIN) 300 MG capsule Take 1 po bid 60 capsule 2    butalbital-acetaminophen-caffeine (FIORICET, ESGIC) -40 MG per tablet Take 1 tablet by mouth every 8 hours as needed for Headaches 60 tablet 1    oxyCODONE HCl (OXY-IR) 10 MG immediate release tablet Take 1 tablet by mouth every 8 hours as needed for Pain for up to 30 days. 90 tablet 0    clopidogrel (PLAVIX) 75 MG tablet Take 1 tablet by mouth daily 90 tablet 3    nitroGLYCERIN (NITROSTAT) 0.4 MG SL tablet DISSOLVE 1 TAB UNDER TONGUE FOR CHEST PAIN - IF PAIN REMAINS AFTER 5 MIN, CALL 911 AND REPEAT DOSE.  MAX 3 TABS IN 15 MINUTES 25 tablet 2    pantoprazole (PROTONIX) 40 MG tablet Take 1 tablet by mouth every morning (before breakfast) 90 tablet 1    Insulin Syringe-Needle U-100 31G X 5/16\" 1 ML MISC Use to subcutaneously inject insulin three times daily 300 each 2    insulin regular human (HUMULIN R) 500 UNIT/ML concentrated injection vial Patient using 0.52ml with breakfast, 0.52ml with lunch and 0.45ml with dinner. 60 mL 3    escitalopram (LEXAPRO) 10 MG tablet Take 1 tablet by mouth daily 90 tablet 3    Continuous Blood Gluc Sensor (FREESTYLE CRISTINE 14 DAY SENSOR) MISC Use one sensor every 14 days 2 each 3    rosuvastatin (CRESTOR) 10 MG tablet Take 1 tablet by mouth nightly Stop pravastatin 90 tablet 3    Continuous Blood Gluc Sensor (FREESTYLE CRISTINE 2 SENSOR) [facility] Patient to apply a new sensor every 14 days. RX to cover voucher patient will bring in. 2 each 0    ammonium lactate (LAC-HYDRIN) 12 % cream Apply topically to dry skin BID. 1 Bottle 4    sulfacetamide (BLEPH-10) 10 % ophthalmic solution       mupirocin (BACTROBAN) 2 % ointment Apply topically 2 times daily on the affected area for 7-10 days. OK to substitute to cream 30 g 2    Gauze Pads & Dressings 4\"X4\" PADS Twice a day dressing of right leg wound 60 each 5    Gauze Bandages (ROLLED GAUZE BANDAGE 4\"X2. 5YD) MISC For twice a day dressing 60 each 5    PROAIR  (90 Base) MCG/ACT inhaler INHALE TWO PUFFS BY MOUTH EVERY 6 HOURS AS NEEDED FOR WHEEZING OR FOR SHORTNESS OF BREATH 8.5 g 3    amitriptyline (ELAVIL) 50 MG tablet Take 2 po qhs 60 tablet 5    nystatin (MYCOSTATIN) 543680 UNIT/GM powder Apply 2 times daily in the skin folds for several months 1 Bottle 3    tiZANidine (ZANAFLEX) 4 MG tablet TAKE ONE TABLET BY MOUTH EVERY 8 HOURS AS NEEDED FOR BACK PAIN 90 tablet 5    bumetanide (BUMEX) 1 MG tablet TAKE THREE TABLETS BY MOUTH TWICE A  tablet 2    spironolactone (ALDACTONE) 50 MG tablet Take 1 tablet by mouth daily 90 tablet 3    vitamin D3 (CHOLECALCIFEROL) 25 MCG (1000 UT) TABS tablet Take 1 tablet by mouth daily 90 tablet 1    ammonium lactate (LAC-HYDRIN) 12 % lotion Apply topically daily. For dry skin 1 Bottle 2    clobetasol (TEMOVATE) 0.05 % ointment Apply topically 2 times daily for psoriasis 1 Tube 3    ketoconazole (NIZORAL) 2 % cream Apply twice a day for yeast infection in the skin folds, for 4 weeks 1 Tube 3    albuterol (PROVENTIL) (2.5 MG/3ML) 0.083% nebulizer solution Take 3 mLs by nebulization every 6 hours as needed for Wheezing or Shortness of Breath 120 vial 3    Ferrous Sulfate (IRON) 325 (65 Fe) MG TABS Take 1 tablet by mouth daily 90 tablet 3    metoprolol tartrate (LOPRESSOR) 50 MG tablet Take 1 tablet by mouth 2 times daily 180 tablet 3    magnesium oxide (MAG-OX) 400 (240 Mg) MG tablet Take 1 tablet by mouth daily 90 tablet 3    lisinopril (PRINIVIL;ZESTRIL) 5 MG tablet Take 1 tablet by mouth daily . Discontinued Lisinopril  10 mg 90 tablet 3    fluticasone-salmeterol (ADVAIR HFA) 230-21 MCG/ACT inhaler Inhale 2 puffs into the lungs 2 times daily 12 g 11    tiotropium (SPIRIVA RESPIMAT) 2.5 MCG/ACT AERS inhaler Inhale 2 puffs into the lungs daily 12 g 11    venlafaxine (EFFEXOR XR) 75 MG extended release capsule Take 1 capsule by mouth daily Take with food 90 capsule 3    naloxone 4 MG/0.1ML LIQD nasal spray 1 spray by Nasal route as needed for Opioid Reversal Due to COPD and sleep apnea, this is a big recommendation for you 1 each 5    blood glucose monitor strips Test 3 times a day & as needed for symptoms of irregular blood glucose. Dispense sufficient amount for indicated testing frequency plus additional to accommodate PRN testing needs. One touch Ultra blue 300 strip 0    Lancets MISC Use to check blood sugar three times daily along with when necessary due to symptoms. 300 each 2    vitamin B-12 (CYANOCOBALAMIN) 500 MCG tablet Take 1 tablet by mouth daily 90 tablet 3    Oxygen Tubing MISC by Does not apply route DX COPD.  chronic respiratory failure 1 each 0    Respiratory Therapy Supplies (NEBULIZER/TUBING/MOUTHPIECE) KIT Dx COPD needs nebulizer supplies 1 kit 11    ONE TOUCH ULTRASOFT LANCETS MISC Patient to test blood sugar up to 4 times daily. 300 each 3    Lancet Devices (LANCING DEVICE) MISC Provide patient with lancing device appropriate for his machine/lancing needles. 1 each 1    Lidocaine 4 % LOTN Apply topically      Spacer/Aero Chamber Mouthpiece MISC 1 each by Does not apply route once as needed (to be used with his inhalers) 1 each 0    metolazone (ZAROXOLYN) 2.5 MG tablet Take 2.5 mg by mouth three times a week MWF      Handicap Placard [facility] by Does not apply route Can't walk greater than 200 feet. Expires in 5 years. 1 each 0    fluticasone (CUTIVATE) 0.05 % cream Apply topically 2 times daily       fluticasone (FLONASE) 50 MCG/ACT nasal spray 2 sprays by Nasal route daily (Patient taking differently: 2 sprays by Nasal route daily as needed (sinus symptoms) ) 1 Bottle 3    Melatonin 10 MG TABS Take 10 mg by mouth nightly as needed (insomnia) 90 tablet 1    aspirin 81 MG EC tablet Take 81 mg by mouth daily. No current facility-administered medications for this visit. Allergies   Allergen Reactions    Levofloxacin Anaphylaxis     Patient reports needing epinephrine \"about 5 or 6 months ago\" for anaphylaxis (itching, hives, SOB/swelling) after receiving Levofloxacin. Previous report from 08/20/2012: Nausea/Vomiting    Lorazepam      Falls      Nsaids      CHF&CKD    Prozac [Fluoxetine Hcl] Other (See Comments)     Pt started with seizures after started taking.  Vancomycin Other (See Comments)     Itching, SOB, emesis upon infusion in ED 6/12/2019. Patient states he has had vancomycin \"a number of times\" before without issue. couldn't breath and talk, throat closed       Review of Systems   Constitutional: Positive for unexpected weight change. HENT: Positive for ear pain, hearing loss and tinnitus. Eyes: Positive for visual disturbance. Respiratory: Positive for cough and shortness of breath. Cardiovascular: Positive for chest pain and leg swelling. Gastrointestinal: Positive for diarrhea. Endocrine: Negative. Genitourinary: Positive for urgency. Musculoskeletal: Positive for arthralgias, back pain, gait problem and myalgias. Skin: Negative. Neurological: Positive for dizziness, weakness and numbness. Hematological: Negative. Psychiatric/Behavioral: Positive for dysphoric mood. The patient is nervous/anxious. Objective:   Physical Exam  Vitals:    08/13/21 1045   BP: 134/82   Pulse: 72   Temp: 97.2 °F (36.2 °C)     weight:  (wheel chair bound.)    Neurological Examination  Constitutional .General exam well groomed   Head/Ears /Nose/Throat: external ear . Normal exam  Neck and thyroid . Normal size. No bruits  Respiratory . Breathsounds clear bilaterally  Cardiovascular: Auscultation of heart with regular rate and rhythm  Musculoskeletal. Muscle bulk and tone normal                                                           Muscle strength 5/5 strength throughout                                                                                No dysmetria or dysdiadokinesis  No tremor   Normal fine motor  Gait normal   Orientation Alert and oriented x 3   Attention and concentration normal  Short term memory normal  Language process and speech normal . No aphasia   Cranial nerve 2 Able to detect only light right eye  acuety and visual fields  Cranial nerve 3, 4 and 6 . Extraocular muscles are intact . Pupils are equal and reactive   Cranial nerve 5 . Normal strength of masseter and temporalis . Intact corneal reflex. Normal facial sensation  Cranial nerve 7 normal exam   Cranial nerve 8. Bilateral hearing loss  Cranial nerve 9 and 10. Symmetric palate elevation   Cranial nerve 11 , 5 out of 5 strength   Cranial Nerve 12 midline tongue . No atrophy  Sensation . Decreased pinprick and light touch distal lower extremities in stocking distributaion  Deep Tendon Reflexes hypoactive with absent ankle jerks   Plantar response flexor bilaterally    Assessment:       Diagnosis Orders   1. Diabetic polyneuropathy associated with diabetes mellitus due to underlying condition ([facility]     2. Chronic low back pain with sciatica, sciatica laterality unspecified, unspecified back pain laterality     3. Seizures ([facility]     4. Migraine without aura and without status migrainosus, not intractable     Will add neurontin 300 mg po bid to elavil for neuropathic pain .  Elavil is also working as headache prophylaxis to use fioricet as abortive      Plan:      As above         Alan Humphries MD

## 2021-08-16 ENCOUNTER — TELEPHONE (OUTPATIENT)
Dept: UROLOGY | Age: 56
End: 2021-08-16

## 2021-08-16 ENCOUNTER — PATIENT MESSAGE (OUTPATIENT)
Dept: FAMILY MEDICINE CLINIC | Age: 56
End: 2021-08-16

## 2021-08-16 DIAGNOSIS — K59.01 SLOW TRANSIT CONSTIPATION: Primary | ICD-10-CM

## 2021-08-16 NOTE — TELEPHONE ENCOUNTER
Zachary Estrada is calling to see if patient can be seen sooner. Zachary Estrada states, Kiran Feldman is scheduled for surgery on 09/07 and would like to know if patient can be seen sooner. Also states that, \"patient groin area is very swollen and patient is unable to urinate\".    Please advise

## 2021-08-17 ENCOUNTER — HOSPITAL ENCOUNTER (OUTPATIENT)
Dept: WOUND CARE | Age: 56
Discharge: HOME | End: 2021-08-17
Payer: COMMERCIAL

## 2021-08-17 ENCOUNTER — OFFICE VISIT (OUTPATIENT)
Dept: UROLOGY | Age: 56
End: 2021-08-17
Payer: COMMERCIAL

## 2021-08-17 VITALS
HEART RATE: 87 BPM | HEIGHT: 73 IN | TEMPERATURE: 97.1 F | BODY MASS INDEX: 41.75 KG/M2 | WEIGHT: 315 LBS | SYSTOLIC BLOOD PRESSURE: 134 MMHG | DIASTOLIC BLOOD PRESSURE: 88 MMHG

## 2021-08-17 VITALS
SYSTOLIC BLOOD PRESSURE: 113 MMHG | DIASTOLIC BLOOD PRESSURE: 64 MMHG | TEMPERATURE: 98.4 F | RESPIRATION RATE: 20 BRPM | HEART RATE: 83 BPM

## 2021-08-17 DIAGNOSIS — I87.2 VENOUS INSUFFICIENCY OF BOTH LOWER EXTREMITIES: ICD-10-CM

## 2021-08-17 DIAGNOSIS — E66.01 MORBID OBESITY WITH BMI OF 50.0-59.9, ADULT (HCC): ICD-10-CM

## 2021-08-17 DIAGNOSIS — I89.0 LYMPHEDEMA OF BOTH LOWER EXTREMITIES: ICD-10-CM

## 2021-08-17 DIAGNOSIS — E11.42 TYPE 2 DIABETES MELLITUS WITH DIABETIC POLYNEUROPATHY, WITH LONG-TERM CURRENT USE OF INSULIN (HCC): ICD-10-CM

## 2021-08-17 DIAGNOSIS — N50.89 EDEMA OF SCROTUM: Primary | ICD-10-CM

## 2021-08-17 DIAGNOSIS — L97.911 NON-PRESSURE CHRONIC ULCER OF RIGHT LOWER LEG, LIMITED TO BREAKDOWN OF SKIN (HCC): Primary | ICD-10-CM

## 2021-08-17 DIAGNOSIS — I87.2 STASIS DERMATITIS OF BOTH LEGS: ICD-10-CM

## 2021-08-17 DIAGNOSIS — Z87.438 HISTORY OF HYDROCELE: ICD-10-CM

## 2021-08-17 DIAGNOSIS — Z79.4 TYPE 2 DIABETES MELLITUS WITH DIABETIC POLYNEUROPATHY, WITH LONG-TERM CURRENT USE OF INSULIN (HCC): ICD-10-CM

## 2021-08-17 DIAGNOSIS — R35.1 NOCTURIA: ICD-10-CM

## 2021-08-17 DIAGNOSIS — R35.0 URINARY FREQUENCY: ICD-10-CM

## 2021-08-17 PROCEDURE — G8427 DOCREV CUR MEDS BY ELIG CLIN: HCPCS | Performed by: NURSE PRACTITIONER

## 2021-08-17 PROCEDURE — 99213 OFFICE O/P EST LOW 20 MIN: CPT | Performed by: NURSE PRACTITIONER

## 2021-08-17 PROCEDURE — G8417 CALC BMI ABV UP PARAM F/U: HCPCS | Performed by: NURSE PRACTITIONER

## 2021-08-17 PROCEDURE — 1036F TOBACCO NON-USER: CPT | Performed by: NURSE PRACTITIONER

## 2021-08-17 PROCEDURE — 99213 OFFICE O/P EST LOW 20 MIN: CPT

## 2021-08-17 PROCEDURE — 3017F COLORECTAL CA SCREEN DOC REV: CPT | Performed by: NURSE PRACTITIONER

## 2021-08-17 RX ORDER — DOCUSATE SODIUM 100 MG/1
100 CAPSULE, LIQUID FILLED ORAL 2 TIMES DAILY
Qty: 180 CAPSULE | Refills: 3 | Status: SHIPPED | OUTPATIENT
Start: 2021-08-17 | End: 2022-10-10 | Stop reason: SDUPTHER

## 2021-08-17 RX ORDER — LIDOCAINE HYDROCHLORIDE 40 MG/ML
SOLUTION TOPICAL ONCE
Status: CANCELLED | OUTPATIENT
Start: 2021-08-17 | End: 2021-08-17

## 2021-08-17 ASSESSMENT — ENCOUNTER SYMPTOMS
EYE REDNESS: 0
DIARRHEA: 0
COUGH: 0
SHORTNESS OF BREATH: 0
NAUSEA: 0
VOMITING: 0
EYE PAIN: 0
WHEEZING: 0
ABDOMINAL PAIN: 1
BACK PAIN: 0
CONSTIPATION: 0

## 2021-08-17 ASSESSMENT — PAIN DESCRIPTION - LOCATION: LOCATION: LEG

## 2021-08-17 ASSESSMENT — PAIN DESCRIPTION - DESCRIPTORS: DESCRIPTORS: ACHING;BURNING

## 2021-08-17 ASSESSMENT — PAIN DESCRIPTION - ONSET: ONSET: ON-GOING

## 2021-08-17 ASSESSMENT — PAIN SCALES - GENERAL: PAINLEVEL_OUTOF10: 0

## 2021-08-17 ASSESSMENT — PAIN DESCRIPTION - FREQUENCY: FREQUENCY: INTERMITTENT

## 2021-08-17 ASSESSMENT — PAIN DESCRIPTION - PAIN TYPE: TYPE: CHRONIC PAIN

## 2021-08-17 ASSESSMENT — PAIN DESCRIPTION - ORIENTATION: ORIENTATION: LEFT;LOWER

## 2021-08-17 ASSESSMENT — PAIN DESCRIPTION - PROGRESSION: CLINICAL_PROGRESSION: NOT CHANGED

## 2021-08-17 NOTE — Clinical Note
[address] [address]  Phone: [phone]  Fax: [phone]    NICOLE Bosch CNP    August 17, 2021     Miky Lu, [address] [facility] [address] [facility]  [address] [facility] [address]    Patient: Pancho Rocha   MR Number: [id number]   YOB: 1965   Date of Visit: 8/17/2021       Dear Miky Lu: Thank you for referring Jani Cherry to me for evaluation/treatment. Below are the relevant portions of my assessment and plan of care. No diagnosis found. No follow-ups on file. Prescriptions Ordered:  No orders of the defined types were placed in this encounter. Orders Placed:  No orders of the defined types were placed in this encounter. If you have questions, please do not hesitate to call me. I look forward to following Miguel along with you.     Sincerely,    NICOLE Bosch CNP, APRN - CNP

## 2021-08-17 NOTE — PROGRESS NOTES
[address] [address]  Dept: 92 [facility] Urology Office Note - Established    Patient:  Lang Green. YOB: 1965  Date: 8/17/2021    The patient is a 63 y.o. male who presents todayfor evaluation of the following problems:   Chief Complaint   Patient presents with    Follow-up     Groin pain       HPI  Patient coming in for concerns of scrotal swelling, urinary frequency 5-6x/day (on Bumex BID), nocturia 4-5x/day. D/t scrotal swelling, hard to expose penis and has to use a hand-held urinal to urinate. Stream is strong and steady, feel his bladder is empty when he's done. D/t scrotal swelling, hard to expose penis and has to use a hand-held urinal to urinate. No hematuria, no dysuria. No fever or chills. No drainage from area. He did have a PSA done 7/24/2021 which is WNL and stable. He does have a hx of b/l hydrocele noted on U/S in the past, it never caused him pain. He has a long hx cardiac and lung disease including CHF and COPD. He is on Bumex BID- at baseline has LE edema and SOB. He last saw his cardiologist 8 mos ago. Summary of old records: N/A    Additional History: N/A    Procedures Today: N/A    Urinalysis today:  No results found for this visit on 08/17/21. Last several PSA's:  Lab Results   Component Value Date    PSA 0.69 07/24/2021    PSA 1.09 11/26/2019    PSA 0.72 10/13/2015     Last total testosterone:  No results found for: TESTOSTERONE    AUA Symptom Score (8/17/2021):   INCOMPLETE EMPTYING: How often have you had the sensation of not emptying your bladder?: Almost always  FREQUENCY: How often do you have to urinate less than every two hours?: About Half the time  INTERMITTENCY: How often have you found you stopped and started again several times when you urinated?: Not at all  URGENCY: How often have you found it difficult to postpone urination?: Almost always  WEAK STREAM: How often have you had a weak urinary stream?: About Half the time  STRAINING: How often have you had to strain to start  urination?: Not at all  NOCTURIA: How many times did you typically get up at night to uriniate?: 5 Times  TOTAL I-PSS SCORE[de-identified] 21  How would you feel if you were to spend the rest of your life with your urinary condition?: Unhappy    Last BUN and creatinine:  Lab Results   Component Value Date    BUN 35 (H) 07/29/2021     Lab Results   Component Value Date    CREATININE 1.63 (H) 07/29/2021       Additional Lab/Culture results: 7/24/2021 urine culture negative    Imaging Reviewed during this Office Visit:    Narrative   EXAMINATION:   ULTRASOUND OF THE SCROTUM/TESTICLES WITH COLOR DOPPLER FLOW EVALUATION       5/31/2019       COMPARISON:   None.       HISTORY:   ORDERING SYSTEM PROVIDED HISTORY: Hydrocele, bilateral       FINDINGS:       Measurements:       Right testicle: 4.9 x 2.5 x 2.8 cm       Left testicle: 4.8 x 3.9 x 2.2 cm           Right:       Grey scale:  The right testicle demonstrates normal homogeneous echotexture   without focal lesion.  No evidence of testicular microlithiasis.       Doppler Evaluation:  There is normal arterial and venous Doppler flow within   the testicle.       Scrotal Sac:  Large hydrocele.  Tiny echogenic focus along the tunica.       Epididymis:  Not readily visualized sonographically           Left:       Grey scale:  The left testicle demonstrates normal homogeneous echotexture   without focal lesion.  No evidence of testicular microlithiasis.       Doppler Evaluation:  There is normal arterial and venous Doppler flow within   the testicle.       Scrotal Sac:  Large hydrocele.       Epididymis:  Not readily visualized sonographically       (results were independently reviewed by physician and radiology report verified)    PAST MEDICAL, FAMILY AND SOCIAL HISTORY UPDATE:  Past Medical History:   Diagnosis Date    Acute on chronic kidney failure ([address] 7/20/2017    Acute on chronic respiratory failure ([address] 10/2/2018    Adhesive capsulitis of left shoulder 3/25/2017    Anxiety 10/02/2016    smokes marijuana for this    Arthropathy, unspecified, other specified sites 6/13/2013    Asthma     B12 deficiency     Bilateral lower leg cellulitis 2/17/2016    Blood in stool     CAD (coronary artery disease)     Cellulitis of both lower extremities 5/25/2017    Cellulitis of leg, left 07/20/2017    CHF (congestive heart failure), NYHA class III (HCC) 8/14/2013    Chronic back pain     Chronic bronchitis (HCC)     Chronic headaches     was referred to neuro, testing scheduled    Chronic respiratory failure ([address]     was on vent    Chronic ulcer of left leg, with fat layer exposed ([address] 02/22/2019    healed    Class 2 severe obesity due to excess calories with serious comorbidity and body mass index (BMI) of 35.0 to 35.9 in adult ([address]     (BMI 35.0-39.9 without comorbidity)    COPD exacerbation ([address] 11/2/2016    Diabetic neuropathy ([address] 8/14/2013    Displacement of lumbar intervertebral disc without myelopathy 6/13/2013    Ear infection     RIGHT    Essential hypertension     Facial cellulitis 2012    Fall 3/25/2017    GERD (gastroesophageal reflux disease)     Head injury     Hearing loss in right ear     pencil pierced ear as a child    Hepatic steatosis 12/3/2015    History of general anesthesia complication     has woke up during surgery under anesthesia    History of rib fracture 12/3/2015    Chronic     Hyperlipidemia     Hypersomnia     can go multiple days without sleeping    Hypertension     Insomnia     Intolerance of continuous positive airway pressure (CPAP) ventilation 7/20/2017    Iron deficiency     Localized rash     gets frequently in axilla, groin, in any fold, on several topical treatments for this    Magnesium deficiency     Mastoiditis of left side     Mixed conductive and sensorineural hearing loss of both ears 1/10/2017    Per ENT    Mixed type COPD (chronic obstructive pulmonary disease) ([address]     On home O2, multiple inhlaers, nebulizer    Moderate recurrent major depression ([address] 10/2/2016    Morbid obesity with BMI of 45.0-49.9, adult ([address] 6/16/2015    On home oxygen therapy     3 Lpm prn    Open wound of groin 12/19/2018    healed     BARBY on CPAP     Osteoarthritis     Otitis externa of left ear     Pancreatitis chronic     Persistent depressive disorder 11/19/2019    Renal insufficiency     proteinuria    Severe depression ([address] 9/25/2013    Spinal stenosis of lumbar region without neurogenic claudication 1/6/2016    MRI lumbar 12/30/15 L3-L4: There is broad-based bulging disc which appears protruding left laterally causing flattening of the ventral thecal sac. In addition, there is facet arthropathy with mild hypertrophic changes.  There is borderline central canal stenosis with  evidence of moderate left neural foraminal narrowing and mild right neural foramina narrowing.   L4-L5: There is broad-based protrud    Syncope 4/28/2017    Tinnitus of both ears 1/10/2017    Per ENT    Type 2 diabetes mellitus with stage 3 chronic kidney disease, with long-term current use of insulin ([address] 12/26/2016    due to underlying condition with hyperosmolarity without coma    Type II or unspecified type diabetes mellitus without mention of complication, not stated as uncontrolled     uncontrolled    Vitamin D deficiency     Wears glasses     for reading     Past Surgical History:   Procedure Laterality Date    BACK SURGERY   (x 4) 2000,.12/2011.2/2012     Dr Mart Jin last 2 Dia Pak  04/23/2018    no stenting    COLONOSCOPY  11/3/2015    hemorrhoids, poor prep, not done    COLONOSCOPY  2013    COLONOSCOPY N/A 4/12/2021    COLONOSCOPY POLYPECTOMY SNARE/COLD BIOPSY/HOT BIOPSY/CLIP APPLICATION X1 performed by Meri Draper MD at [address] [place] [address] ANGIOPLASTY WITH STENT PLACEMENT  March 2013    x 1    HAND TENDON SURGERY Left     thumb tendon repair    INTRACAPSULAR CATARACT EXTRACTION Right 2019    EYE CATARACT EMULSIFICATION IOL IMPLANT performed by Leta Gutierrez MD at [address] [place] [address] Left 2020    EYE CATARACT EMULSIFICATION IOL IMPLANT performed by Leta Gutierrez MD at [address] [facility] ARTHROSCOPY Left     NERVE BLOCK  15    TENS unit    RI ESOPHAGOGASTRODUODENOSCOPY TRANSORAL DIAGNOSTIC N/A 2018    EGD ESOPHAGOGASTRODUODENOSCOPY performed by Marli Guillen MD at [facility] TYMPANOMASTOIDECTOMY Bilateral 2012    Dr Michael Daly     Family History   Problem Relation Age of Onset    Heart Disease Mother          age 64 from MI   Jannell Outhouse High Blood Pressure Mother     Diabetes Mother     High Blood Pressure Father          age 80 from CKD and Lung Fibrosis    Kidney Disease Father     Heart Disease Sister     Heart Attack Sister     Obesity Sister     Diabetes Sister      Outpatient Medications Marked as Taking for the 21 encounter (Office Visit) with NICOLE Knott - CNP   Medication Sig Dispense Refill    docusate sodium (COLACE) 100 MG capsule Take 1 capsule by mouth 2 times daily For constipation 180 capsule 3    latanoprost (XALATAN) 0.005 % ophthalmic solution 1 drop nightly      gabapentin (NEURONTIN) 300 MG capsule Take 1 po bid 60 capsule 2    butalbital-acetaminophen-caffeine (FIORICET, ESGIC) -40 MG per tablet Take 1 tablet by mouth every 8 hours as needed for Headaches 60 tablet 1    oxyCODONE HCl (OXY-IR) 10 MG immediate release tablet Take 1 tablet by mouth every 8 hours as needed for Pain for up to 30 days.  90 tablet 0    clopidogrel (PLAVIX) 75 MG tablet Take 1 tablet by mouth daily 90 tablet 3    nitroGLYCERIN (NITROSTAT) 0.4 MG SL tablet DISSOLVE 1 TAB UNDER TONGUE FOR CHEST PAIN - IF PAIN REMAINS AFTER 5 MIN, CALL 911 AND REPEAT DOSE. MAX 3 TABS IN 15 MINUTES 25 tablet 2    pantoprazole (PROTONIX) 40 MG tablet Take 1 tablet by mouth every morning (before breakfast) 90 tablet 1    Insulin Syringe-Needle U-100 31G X 5/16\" 1 ML MISC Use to subcutaneously inject insulin three times daily 300 each 2    insulin regular human (HUMULIN R) 500 UNIT/ML concentrated injection vial Patient using 0.52ml with breakfast, 0.52ml with lunch and 0.45ml with dinner. 60 mL 3    escitalopram (LEXAPRO) 10 MG tablet Take 1 tablet by mouth daily 90 tablet 3    Continuous Blood Gluc Sensor (FREESTYLE CRISTINE 14 DAY SENSOR) MISC Use one sensor every 14 days 2 each 3    rosuvastatin (CRESTOR) 10 MG tablet Take 1 tablet by mouth nightly Stop pravastatin 90 tablet 3    Continuous Blood Gluc Sensor (FREESTYLE CRISTINE 2 SENSOR) [facility] Patient to apply a new sensor every 14 days. RX to cover voucher patient will bring in. 2 each 0    ammonium lactate (LAC-HYDRIN) 12 % cream Apply topically to dry skin BID. 1 Bottle 4    sulfacetamide (BLEPH-10) 10 % ophthalmic solution       mupirocin (BACTROBAN) 2 % ointment Apply topically 2 times daily on the affected area for 7-10 days. OK to substitute to cream 30 g 2    Gauze Pads & Dressings 4\"X4\" PADS Twice a day dressing of right leg wound 60 each 5    Gauze Bandages (ROLLED GAUZE BANDAGE 4\"X2. 5YD) MISC For twice a day dressing 60 each 5    PROAIR  (90 Base) MCG/ACT inhaler INHALE TWO PUFFS BY MOUTH EVERY 6 HOURS AS NEEDED FOR WHEEZING OR FOR SHORTNESS OF BREATH 8.5 g 3    amitriptyline (ELAVIL) 50 MG tablet Take 2 po qhs 60 tablet 5    nystatin (MYCOSTATIN) 265372 UNIT/GM powder Apply 2 times daily in the skin folds for several months 1 Bottle 3    tiZANidine (ZANAFLEX) 4 MG tablet TAKE ONE TABLET BY MOUTH EVERY 8 HOURS AS NEEDED FOR BACK PAIN 90 tablet 5    bumetanide (BUMEX) 1 MG tablet TAKE THREE TABLETS BY MOUTH TWICE A  tablet 2    spironolactone (ALDACTONE) 50 MG tablet Take 1 tablet by mouth daily 90 tablet 3    vitamin D3 (CHOLECALCIFEROL) 25 MCG (1000 UT) TABS tablet Take 1 tablet by mouth daily 90 tablet 1    ammonium lactate (LAC-HYDRIN) 12 % lotion Apply topically daily. For dry skin 1 Bottle 2    clobetasol (TEMOVATE) 0.05 % ointment Apply topically 2 times daily for psoriasis 1 Tube 3    ketoconazole (NIZORAL) 2 % cream Apply twice a day for yeast infection in the skin folds, for 4 weeks 1 Tube 3    albuterol (PROVENTIL) (2.5 MG/3ML) 0.083% nebulizer solution Take 3 mLs by nebulization every 6 hours as needed for Wheezing or Shortness of Breath 120 vial 3    Ferrous Sulfate (IRON) 325 (65 Fe) MG TABS Take 1 tablet by mouth daily 90 tablet 3    metoprolol tartrate (LOPRESSOR) 50 MG tablet Take 1 tablet by mouth 2 times daily 180 tablet 3    magnesium oxide (MAG-OX) 400 (240 Mg) MG tablet Take 1 tablet by mouth daily 90 tablet 3    lisinopril (PRINIVIL;ZESTRIL) 5 MG tablet Take 1 tablet by mouth daily . Discontinued Lisinopril  10 mg 90 tablet 3    fluticasone-salmeterol (ADVAIR HFA) 230-21 MCG/ACT inhaler Inhale 2 puffs into the lungs 2 times daily 12 g 11    tiotropium (SPIRIVA RESPIMAT) 2.5 MCG/ACT AERS inhaler Inhale 2 puffs into the lungs daily 12 g 11    venlafaxine (EFFEXOR XR) 75 MG extended release capsule Take 1 capsule by mouth daily Take with food 90 capsule 3    naloxone 4 MG/0.1ML LIQD nasal spray 1 spray by Nasal route as needed for Opioid Reversal Due to COPD and sleep apnea, this is a big recommendation for you 1 each 5    blood glucose monitor strips Test 3 times a day & as needed for symptoms of irregular blood glucose. Dispense sufficient amount for indicated testing frequency plus additional to accommodate PRN testing needs. One touch Ultra blue 300 strip 0    Lancets MISC Use to check blood sugar three times daily along with when necessary due to symptoms.  300 each 2    vitamin B-12 (CYANOCOBALAMIN) 500 MCG tablet Take 1 tablet by mouth daily 90 tablet 3  Oxygen Tubing MISC by Does not apply route DX COPD. chronic respiratory failure 1 each 0    Respiratory Therapy Supplies (NEBULIZER/TUBING/MOUTHPIECE) KIT Dx COPD needs nebulizer supplies 1 kit 11    ONE TOUCH ULTRASOFT LANCETS MISC Patient to test blood sugar up to 4 times daily. 300 each 3    Lancet Devices (LANCING DEVICE) MISC Provide patient with lancing device appropriate for his machine/lancing needles. 1 each 1    Lidocaine 4 % LOTN Apply topically      metolazone (ZAROXOLYN) 2.5 MG tablet Take 2.5 mg by mouth three times a week MWF      Handicap Placard [facility] by Does not apply route Can't walk greater than 200 feet. Expires in 5 years. 1 each 0    fluticasone (CUTIVATE) 0.05 % cream Apply topically 2 times daily       fluticasone (FLONASE) 50 MCG/ACT nasal spray 2 sprays by Nasal route daily (Patient taking differently: 2 sprays by Nasal route daily as needed (sinus symptoms) ) 1 Bottle 3    Melatonin 10 MG TABS Take 10 mg by mouth nightly as needed (insomnia) 90 tablet 1    aspirin 81 MG EC tablet Take 81 mg by mouth daily. Levofloxacin, Lorazepam, Nsaids, Prozac [fluoxetine hcl], and Vancomycin  Social History     Tobacco Use   Smoking Status Former Smoker    Packs/day: 0.25    Years: 33.00    Pack years: 8.25    Types: Cigarettes    Start date: 1985   Ardyth Moritz Quit date: 2020    Years since quittin.7   Smokeless Tobacco Former User    Types: Snuff    Quit date: 1995     (Ifpatient a smoker, smoking cessation counseling offered)    Social History     Substance and Sexual Activity   Alcohol Use No    Alcohol/week: 0.0 standard drinks       REVIEW OF SYSTEMS:  Review of Systems    Physical Exam:      Vitals:    21 1048   BP: 134/88   Pulse: 87   Temp: 97.1 °F (36.2 °C)     Body mass index is 58.05 kg/m². Patient is a 63 y.o. male in no acute distress and alert and oriented to person, place and time.   Physical Exam  Constitutional: Patient in no acute distress. Neuro: Alert and oriented to person, place and time. Psych: Mood normal, affect normal  Lungs: Shortness of breath with activity, pursed lip breathing (hx copd, wife and pt state it is baseline)  Cardiovascular: Warm & Pink  Abdomen: Soft, non-tender, non-distended Bladder non-tender and not distended. Musculoskeletal: Normal gait and station  Scrotum:  Large amount of dependent edema that is pitting. Difficulty palpating hydrocele d/t excessive edema. Excoriation noted to scrotum. No erythema, warmth, drainage, induration. Assessment and Plan      1. Edema of scrotum    2. History of hydrocele    3. Urinary frequency    4. Nocturia           Plan:     1. Elevate scrotum using a towel or pillow case. May use ice  20 mins on, 20 mins off which may show improvement in the swelling. Like d/t patient sitting for prolonged periods of time in wheelchair and his chair at home- he admits he \"does absolutely nothing, not even cooking\" at home and his fluid retention d/t CHF. 2. Will get repeat scrotal uls to reassess hydrocele. Poor surgical candidate d/t extensive cardiac and pulmonary history. If no improvement, he would be interested in aspiration. 3. May use barrier cream to help with excoriation to scrotum. 4. F/u 1 mos scrotal uls/scrotal swelling with Dr. Moran. Return in about 4 weeks (around 9/14/2021) for scrotal u/s- with Dr. Moran. .    Prescriptions Ordered:  No orders of the defined types were placed in this encounter. Orders Placed:  Orders Placed This Encounter   Procedures    US SCROTUM AND TESTICLES     Please schedule with doppler     Standing Status:   Future     Standing Expiration Date:   8/12/2022           NICOLE Dodd CNP    Reviewed and agree with the ROS entered by the MA.

## 2021-08-17 NOTE — TELEPHONE ENCOUNTER
Called [facility] to schedule an appointment with LIZETT Lujan.  Patient scheduled today 08/17/21 at 11:00am.

## 2021-08-17 NOTE — PROGRESS NOTES
[address]   Progress Note and Procedure Note      Miguel Ruff. MEDICAL RECORD NUMBER:  [id number]  AGE: 63 y.o. GENDER: male  : 1965  EPISODE DATE:  2021    Subjective:     Chief Complaint   Patient presents with    Wound Check     right lower leg         HISTORY of PRESENT ILLNESS HPI     Donna Chavarria is a 63 y.o. male who presents today for wound/ulcer evaluation. History of Wound Context: here to follow up on right lower leg ulceration that remains closed.    Wound/Ulcer Pain Timing/Severity: none  Quality of pain: N/A  Severity:  0 / 10   Modifying Factors: None  Associated Signs/Symptoms: none    Ulcer Identification:  Ulcer Type: venous  Contributing Factors: edema, venous stasis, lymphedema, diabetes, decreased mobility and obesity    Wound: N/A        PAST MEDICAL HISTORY        Diagnosis Date    Acute on chronic kidney failure (HCC) 2017    Acute on chronic respiratory failure (HCC) 10/2/2018    Adhesive capsulitis of left shoulder 3/25/2017    Anxiety 10/02/2016    smokes marijuana for this    Arthropathy, unspecified, other specified sites 2013    Asthma     B12 deficiency     Bilateral lower leg cellulitis 2016    Blood in stool     CAD (coronary artery disease)     Cellulitis of both lower extremities 2017    Cellulitis of leg, left 2017    CHF (congestive heart failure), NYHA class III ([facility]) 2013    Chronic back pain     Chronic bronchitis ([facility])     Chronic headaches     was referred to neuro, testing scheduled    Chronic respiratory failure ([address]     was on vent    Chronic ulcer of left leg, with fat layer exposed ([facility] 2019    healed    Class 2 severe obesity due to excess calories with serious comorbidity and body mass index (BMI) of 35.0 to 35.9 in adult ([address]     (BMI 35.0-39.9 without comorbidity)    COPD exacerbation ([address] 2016    Diabetic neuropathy ([address] 2013    Displacement of lumbar intervertebral disc without myelopathy 6/13/2013    Ear infection     RIGHT    Essential hypertension     Facial cellulitis 2012    Fall 3/25/2017    GERD (gastroesophageal reflux disease)     Head injury     Hearing loss in right ear     pencil pierced ear as a child    Hepatic steatosis 12/3/2015    History of general anesthesia complication     has woke up during surgery under anesthesia    History of rib fracture 12/3/2015    Chronic     Hyperlipidemia     Hypersomnia     can go multiple days without sleeping    Hypertension     Insomnia     Intolerance of continuous positive airway pressure (CPAP) ventilation 7/20/2017    Iron deficiency     Localized rash     gets frequently in axilla, groin, in any fold, on several topical treatments for this    Magnesium deficiency     Mastoiditis of left side     Mixed conductive and sensorineural hearing loss of both ears 1/10/2017    Per ENT    Mixed type COPD (chronic obstructive pulmonary disease) ([address]     On home O2, multiple inhlaers, nebulizer    Moderate recurrent major depression ([address] 10/2/2016    Morbid obesity with BMI of 45.0-49.9, adult ([address] 6/16/2015    On home oxygen therapy     3 Lpm prn    Open wound of groin 12/19/2018    healed     BARBY on CPAP     Osteoarthritis     Otitis externa of left ear     Pancreatitis chronic     Persistent depressive disorder 11/19/2019    Renal insufficiency     proteinuria    Severe depression ([address] 9/25/2013    Spinal stenosis of lumbar region without neurogenic claudication 1/6/2016    MRI lumbar 12/30/15 L3-L4: There is broad-based bulging disc which appears protruding left laterally causing flattening of the ventral thecal sac. In addition, there is facet arthropathy with mild hypertrophic changes.  There is borderline central canal stenosis with  evidence of moderate left neural foraminal narrowing and mild right neural foramina narrowing.   L4-L5: There is broad-based protrud    Syncope 2017    Tinnitus of both ears 1/10/2017    Per ENT    Type 2 diabetes mellitus with stage 3 chronic kidney disease, with long-term current use of insulin ([address] [facility] 2016    due to underlying condition with hyperosmolarity without coma    Type II or unspecified type diabetes mellitus without mention of complication, not stated as uncontrolled     uncontrolled    Vitamin D deficiency     Wears glasses     for reading       PAST SURGICAL HISTORY    Past Surgical History:   Procedure Laterality Date    BACK SURGERY   (x 4) ,.2011.2012     Dr Mustapha Bradford last [address]  2018    no stenting    COLONOSCOPY  11/3/2015    hemorrhoids, poor prep, not done    COLONOSCOPY      COLONOSCOPY N/A 2021    COLONOSCOPY POLYPECTOMY SNARE/COLD BIOPSY/HOT BIOPSY/CLIP APPLICATION X1 performed by Abhilash Shannon MD at [address]  March 2013    x 1    HAND TENDON SURGERY Left     thumb tendon repair    INTRACAPSULAR CATARACT EXTRACTION Right 2019    EYE CATARACT EMULSIFICATION IOL IMPLANT performed by Paige Driscoll MD at [facility] Left 2020    EYE CATARACT EMULSIFICATION IOL IMPLANT performed by Paige Driscoll MD at [address] [facility] ARTHROSCOPY Left     NERVE BLOCK  07-31-15    TENS unit    DE ESOPHAGOGASTRODUODENOSCOPY TRANSORAL DIAGNOSTIC N/A 2018    EGD ESOPHAGOGASTRODUODENOSCOPY performed by Abhilash Shannon MD at [address] [facility] TYMPANOMASTOIDECTOMY Bilateral 2012    Dr Zurita Monegasque History   Problem Relation Age of Onset    Heart Disease Mother          age 64 from MI   Blase Liming High Blood Pressure Mother     Diabetes Mother     High Blood Pressure Father          age 80 from CKD and Lung Fibrosis    Kidney Disease Father     Heart Disease Sister     Heart Attack Sister     Obesity Sister     Diabetes Sister        SOCIAL HISTORY    Social History     Tobacco Use    Smoking status: Former Smoker     Packs/day: 0.25     Years: 33.00     Pack years: 8.25     Types: Cigarettes     Start date: 1985     Quit date: 2020     Years since quittin.7    Smokeless tobacco: Former User     Types: Snuff     Quit date: 1995   Vaping Use    Vaping Use: Never used   Substance Use Topics    Alcohol use: No     Alcohol/week: 0.0 standard drinks    Drug use: Not Currently     Types: Marijuana     Comment: Patient reports he quit using THC for 26 days on 2021       ALLERGIES    Allergies   Allergen Reactions    Levofloxacin Anaphylaxis     Patient reports needing epinephrine \"about 5 or 6 months ago\" for anaphylaxis (itching, hives, SOB/swelling) after receiving Levofloxacin. Previous report from 2012: Nausea/Vomiting    Lorazepam      Falls      Nsaids      CHF&CKD    Prozac [Fluoxetine Hcl] Other (See Comments)     Pt started with seizures after started taking.  Vancomycin Other (See Comments)     Itching, SOB, emesis upon infusion in ED 2019. Patient states he has had vancomycin \"a number of times\" before without issue. couldn't breath and talk, throat closed       MEDICATIONS    Current Outpatient Medications on File Prior to Encounter   Medication Sig Dispense Refill    docusate sodium (COLACE) 100 MG capsule Take 1 capsule by mouth 2 times daily For constipation 180 capsule 3    latanoprost (XALATAN) 0.005 % ophthalmic solution 1 drop nightly      gabapentin (NEURONTIN) 300 MG capsule Take 1 po bid 60 capsule 2    butalbital-acetaminophen-caffeine (FIORICET, ESGIC) -40 MG per tablet Take 1 tablet by mouth every 8 hours as needed for Headaches 60 tablet 1    oxyCODONE HCl (OXY-IR) 10 MG immediate release tablet Take 1 tablet by mouth every 8 hours as needed for Pain for up to 30 days.  90 tablet 0    clopidogrel (PLAVIX) 75 MG tablet Take 1 tablet by mouth daily 90 tablet 3    nitroGLYCERIN (NITROSTAT) 0.4 MG SL tablet DISSOLVE 1 TAB UNDER TONGUE FOR CHEST PAIN - IF PAIN REMAINS AFTER 5 MIN, CALL 911 AND REPEAT DOSE. MAX 3 TABS IN 15 MINUTES 25 tablet 2    pantoprazole (PROTONIX) 40 MG tablet Take 1 tablet by mouth every morning (before breakfast) 90 tablet 1    Insulin Syringe-Needle U-100 31G X 5/16\" 1 ML MISC Use to subcutaneously inject insulin three times daily 300 each 2    insulin regular human (HUMULIN R) 500 UNIT/ML concentrated injection vial Patient using 0.52ml with breakfast, 0.52ml with lunch and 0.45ml with dinner. 60 mL 3    escitalopram (LEXAPRO) 10 MG tablet Take 1 tablet by mouth daily 90 tablet 3    Continuous Blood Gluc Sensor (FREESTYLE CRISTINE 14 DAY SENSOR) MISC Use one sensor every 14 days 2 each 3    rosuvastatin (CRESTOR) 10 MG tablet Take 1 tablet by mouth nightly Stop pravastatin 90 tablet 3    Continuous Blood Gluc Sensor (FREESTYLE CRISTINE 2 SENSOR) [facility] Patient to apply a new sensor every 14 days. RX to cover voucher patient will bring in. 2 each 0    ammonium lactate (LAC-HYDRIN) 12 % cream Apply topically to dry skin BID. 1 Bottle 4    sulfacetamide (BLEPH-10) 10 % ophthalmic solution       mupirocin (BACTROBAN) 2 % ointment Apply topically 2 times daily on the affected area for 7-10 days. OK to substitute to cream 30 g 2    Gauze Pads & Dressings 4\"X4\" PADS Twice a day dressing of right leg wound 60 each 5    Gauze Bandages (ROLLED GAUZE BANDAGE 4\"X2. 5YD) MISC For twice a day dressing 60 each 5    PROAIR  (90 Base) MCG/ACT inhaler INHALE TWO PUFFS BY MOUTH EVERY 6 HOURS AS NEEDED FOR WHEEZING OR FOR SHORTNESS OF BREATH 8.5 g 3    amitriptyline (ELAVIL) 50 MG tablet Take 2 po qhs 60 tablet 5    nystatin (MYCOSTATIN) 442300 UNIT/GM powder Apply 2 times daily in the skin folds for several months 1 Bottle 3    tiZANidine (ZANAFLEX) 4 MG tablet TAKE ONE TABLET BY MOUTH EVERY 8 HOURS AS NEEDED FOR BACK PAIN 90 tablet 5    bumetanide (BUMEX) 1 MG tablet TAKE THREE TABLETS BY MOUTH TWICE A  tablet 2    spironolactone (ALDACTONE) 50 MG tablet Take 1 tablet by mouth daily 90 tablet 3    vitamin D3 (CHOLECALCIFEROL) 25 MCG (1000 UT) TABS tablet Take 1 tablet by mouth daily 90 tablet 1    ammonium lactate (LAC-HYDRIN) 12 % lotion Apply topically daily. For dry skin 1 Bottle 2    clobetasol (TEMOVATE) 0.05 % ointment Apply topically 2 times daily for psoriasis 1 Tube 3    ketoconazole (NIZORAL) 2 % cream Apply twice a day for yeast infection in the skin folds, for 4 weeks 1 Tube 3    albuterol (PROVENTIL) (2.5 MG/3ML) 0.083% nebulizer solution Take 3 mLs by nebulization every 6 hours as needed for Wheezing or Shortness of Breath 120 vial 3    Ferrous Sulfate (IRON) 325 (65 Fe) MG TABS Take 1 tablet by mouth daily 90 tablet 3    metoprolol tartrate (LOPRESSOR) 50 MG tablet Take 1 tablet by mouth 2 times daily 180 tablet 3    magnesium oxide (MAG-OX) 400 (240 Mg) MG tablet Take 1 tablet by mouth daily 90 tablet 3    lisinopril (PRINIVIL;ZESTRIL) 5 MG tablet Take 1 tablet by mouth daily . Discontinued Lisinopril  10 mg 90 tablet 3    fluticasone-salmeterol (ADVAIR HFA) 230-21 MCG/ACT inhaler Inhale 2 puffs into the lungs 2 times daily 12 g 11    tiotropium (SPIRIVA RESPIMAT) 2.5 MCG/ACT AERS inhaler Inhale 2 puffs into the lungs daily 12 g 11    venlafaxine (EFFEXOR XR) 75 MG extended release capsule Take 1 capsule by mouth daily Take with food 90 capsule 3    naloxone 4 MG/0.1ML LIQD nasal spray 1 spray by Nasal route as needed for Opioid Reversal Due to COPD and sleep apnea, this is a big recommendation for you 1 each 5    blood glucose monitor strips Test 3 times a day & as needed for symptoms of irregular blood glucose. Dispense sufficient amount for indicated testing frequency plus additional to accommodate PRN testing needs.  One touch Ultra blue 300 strip 0    Lancets MISC Use to check blood sugar three times daily along with when necessary due to symptoms. 300 each 2    vitamin B-12 (CYANOCOBALAMIN) 500 MCG tablet Take 1 tablet by mouth daily 90 tablet 3    Oxygen Tubing MISC by Does not apply route DX COPD. chronic respiratory failure 1 each 0    Respiratory Therapy Supplies (NEBULIZER/TUBING/MOUTHPIECE) KIT Dx COPD needs nebulizer supplies 1 kit 11    ONE TOUCH ULTRASOFT LANCETS MISC Patient to test blood sugar up to 4 times daily. 300 each 3    Lancet Devices (LANCING DEVICE) MISC Provide patient with lancing device appropriate for his machine/lancing needles. 1 each 1    Lidocaine 4 % LOTN Apply topically      Spacer/Aero Chamber Mouthpiece MISC 1 each by Does not apply route once as needed (to be used with his inhalers) 1 each 0    metolazone (ZAROXOLYN) 2.5 MG tablet Take 2.5 mg by mouth three times a week MWF      Handicap Placard MISC by Does not apply route Can't walk greater than 200 feet. Expires in 5 years. 1 each 0    fluticasone (CUTIVATE) 0.05 % cream Apply topically 2 times daily       fluticasone (FLONASE) 50 MCG/ACT nasal spray 2 sprays by Nasal route daily (Patient taking differently: 2 sprays by Nasal route daily as needed (sinus symptoms) ) 1 Bottle 3    Melatonin 10 MG TABS Take 10 mg by mouth nightly as needed (insomnia) 90 tablet 1    aspirin 81 MG EC tablet Take 81 mg by mouth daily. No current facility-administered medications on file prior to encounter.        REVIEW OF SYSTEMS    Constitutional: negative  Eyes: negative  Ears, nose, mouth, throat, and face: negative  Respiratory: negative  Cardiovascular: negative except for lower extremity edema  Gastrointestinal: negative  Genitourinary:negative  Integument/breast: negative  Hematologic/lymphatic: negative  Musculoskeletal:negative  Neurological: negative except for paresthesia  Behavioral/Psych: negative  Endocrine: negative  Allergic/Immunologic: negative    Objective:      /64   Pulse 83   Temp 98.4 °F (36.9 °C) (Tympanic)   Resp 20     Wt Readings from Last 3 Encounters:   08/17/21 (!) 440 lb (199.6 kg)   08/09/21 (!) 440 lb (199.6 kg)   08/03/21 (!) 440 lb (199.6 kg)       PHYSICAL EXAM    General Appearance: alert and oriented to person, place and time, well-developed and obese, in no acute distress  Skin: warm and dry, no rash or erythema  Head: normocephalic and atraumatic  Eyes: pupils equal, round, extraocular eye movements intact, and conjunctivae normal  Pulmonary/Chest: normal air movement, no respiratory distress  Extremities: no cyanosis and no clubbing  Musculoskeletal: no joint swelling, deformity or tenderness  Neurologic: gait, coordination normal and speech normal      Assessment:      Problem List Items Addressed This Visit     Lymphedema of both lower extremities    Relevant Orders    Initiate Outpatient Wound Care Protocol    Morbid obesity with BMI of 50.0-59.9, adult ([address]    Relevant Orders    Initiate Outpatient Wound Care Protocol    Non-pressure chronic ulcer of right lower leg, limited to breakdown of skin ([address] - Primary    Relevant Orders    Initiate Outpatient Wound Care Protocol    Stasis dermatitis of both legs    Relevant Orders    Initiate Outpatient Wound Care Protocol    Type 2 diabetes mellitus with diabetic polyneuropathy, with long-term current use of insulin ([address]    Relevant Orders    Initiate Outpatient Wound Care Protocol    Venous insufficiency of both lower extremities    Relevant Orders    Initiate Outpatient Wound Care Protocol           Wound 08/03/21 Leg Right;Posterior; Lateral;Lower wound #2 right lower leg posterior lateral  (Active)   Wound Image   08/09/21 0927   Wound Etiology Venous 08/03/21 1441   Dressing Status New drainage noted; Old drainage noted 08/03/21 1441   Wound Cleansed Cleansed with saline 08/03/21 1441   Dressing/Treatment Other (comment) 08/09/21 0953   Wound Length (cm) 0 cm 08/17/21 1305   Wound Width (cm) 0 cm 08/17/21 1305   Wound Depth (cm) 0 cm 08/17/21 1305   Wound Surface Area (cm^2) 0 cm^2 08/17/21 1305   Change in Wound Size % (l*w) 100 08/17/21 1305   Wound Volume (cm^3) 0 cm^3 08/17/21 1305   Wound Healing % 100 08/17/21 1305   Post-Procedure Length (cm) 0 cm 08/17/21 1305   Post-Procedure Width (cm) 0 cm 08/17/21 1305   Post-Procedure Depth (cm) 0 cm 08/17/21 1305   Post-Procedure Surface Area (cm^2) 0 cm^2 08/17/21 1305   Post-Procedure Volume (cm^3) 0 cm^3 08/17/21 1305   Wound Assessment Regina/red;Slough 08/03/21 1441   Drainage Amount Moderate 08/03/21 1441   Drainage Description Serosanguinous; Yellow 08/03/21 1441   Odor None 08/03/21 1441   Terrie-wound Assessment Maceration 08/03/21 1441   Margins Attached edges 08/03/21 1441   Number of days: 13          Wound is healed    Plan:     Treatment Note please see attached Discharge Instructions    Written patient dismissal instructions given to patient and signed by patient or POA. Discharge Instructions         [address] [place] [facility]                                 Visit Arin Instructions / Physician Orders  DATE:8/17/21     Home Care: none     SUPPLIES ORDERED THRU: NA     Wound Location:  right posterior lower leg healed     Cleanse with: Liquid antibacterial soap and water, rinse well      Dressing Orders:  Primary dressing   circaid to right lower leg,  tubigrip today in wound care.                                                                      ORQKGAZ to left lower leg,     Frequency:  Keep dry and in place.   Additional Orders: Increase protein to diet (meat, cheese, eggs, fish, peanut butter, nuts and beans)  Multivitamin daily     MY CHART []? ?     Smart Device  []? ?      HYPERBARIC TREATMENT-                [id number] #     Your next appointment with the [address] [place] [address] Road is in as needed                                                                                                   ROOM TYPE   []? ? CHAIR     []? ? BED   []? ?Edwin Mckeon        TIME []?? 45 MIN     []? ? 60 MIN     (Please note your next appointment above and if you are unable to keep, kindly give a 24 hour notice. Thank you.)     If you experience any of the following, please call the [facility] [address] during business hours:  [phone]     * Increase in Pain  * Temperature over 101  * Increase in drainage from your wound  * Drainage with a foul odor  * Bleeding  * Increase in swelling  * Need for compression bandage changes due to slippage, breakthrough drainage.     If you need medical attention outside of the business hours of the [facility] please contact your PCP or go to the nearest emergency room.     The information contained in the After Visit Summary has been reviewed with me, the patient and/or responsible adult, by my health care provider(s). I had the opportunity to ask questions regarding this information. I have elected to receive;      []? ? After Visit Summary  [x]? ? Comprehensive Discharge Instruction        Patient signature______________________________________Date:________  Electronically signed by Siri Oppenheim, RN on 8/17/2021 at 1:12 PM   Electronically signed by NICOLE Diaz CNP on 8/17/2021 at 1:14 PM          Electronically signed by NICOLE Diaz CNP on 8/17/2021 at 1:14 PM

## 2021-08-17 NOTE — PROGRESS NOTES
Review of Systems   Constitutional: Negative for appetite change, chills and fatigue. Eyes: Negative for pain, redness and visual disturbance. Respiratory: Negative for cough, shortness of breath and wheezing. Cardiovascular: Positive for leg swelling. Negative for chest pain. Gastrointestinal: Positive for abdominal pain. Negative for constipation, diarrhea, nausea and vomiting. Genitourinary: Positive for difficulty urinating and frequency. Negative for dysuria, flank pain, hematuria and urgency. Musculoskeletal: Negative for back pain (Chronic back pain, history of surgeries), joint swelling and myalgias. Skin: Positive for rash. Negative for wound. Neurological: Negative for dizziness, weakness and numbness. Hematological: Does not bruise/bleed easily. All other systems reviewed and are negative.

## 2021-08-17 NOTE — TELEPHONE ENCOUNTER
From: Louann Beltre. To: Alvin Rodriguez MD  Sent: 8/16/2021 6:19 PM EDT  Subject: Non-Urgent Medical Question    Can you put me back on stool softener?  I am not having bowel movement as normal

## 2021-08-24 ENCOUNTER — PATIENT MESSAGE (OUTPATIENT)
Dept: FAMILY MEDICINE CLINIC | Age: 56
End: 2021-08-24

## 2021-08-24 ENCOUNTER — HOSPITAL ENCOUNTER (OUTPATIENT)
Facility: MEDICAL CENTER | Age: 56
End: 2021-08-24
Payer: COMMERCIAL

## 2021-08-24 DIAGNOSIS — E55.9 VITAMIN D DEFICIENCY: ICD-10-CM

## 2021-08-25 ENCOUNTER — TELEPHONE (OUTPATIENT)
Dept: PHARMACY | Age: 56
End: 2021-08-25

## 2021-08-25 RX ORDER — MELATONIN
1000 DAILY
Qty: 90 TABLET | Refills: 1 | Status: SHIPPED | OUTPATIENT
Start: 2021-08-25 | End: 2022-03-02 | Stop reason: SDUPTHER

## 2021-08-25 NOTE — TELEPHONE ENCOUNTER
Patient called to cancel appt for diabetes medication management for tomorrow 8/26 due to having significant groin pain and not being able to get out of bed. Patient reports significant swelling in groin \"testicles the size of watermelons\". Was to urologist on 8/17 is was told he would have a ultrasound scheduled for follow up. Patient has not heard anything as of yet. Encouraged patient to call office to ask when [address] would be done and request ASAP due to continuing pain. Patient reports blood sugars overall not being that bad but also admitting some low blood sugars - below 70. Patient reports not eating much as trying to avoid having to use the restroom as much as possible due to groin issue. Patient has had blood sugars up to 257 as well. Patient is adjusting his insulin dose based on intake and blood sugar. Asked patient to continue adjustment and will follow up with him in a few days to see if he has seen any improvement and can schedule a follow up in office. Yesenia Rizvi [facility],Pharm. D,, BCPS, CACP  8/25/2021  1:58 PM

## 2021-08-25 NOTE — TELEPHONE ENCOUNTER
From: Kwaku Gtz.   To: Artur Garcia MD  Sent: 8/24/2021 5:27 PM EDT  Subject: Non-Urgent Medical Question    Can you please refill my vitamin D3 25 mcg

## 2021-08-30 ENCOUNTER — APPOINTMENT (OUTPATIENT)
Dept: GENERAL RADIOLOGY | Age: 56
End: 2021-08-30
Payer: COMMERCIAL

## 2021-08-30 ENCOUNTER — HOSPITAL ENCOUNTER (EMERGENCY)
Age: 56
Discharge: HOME | End: 2021-08-30
Attending: EMERGENCY MEDICINE
Payer: COMMERCIAL

## 2021-08-30 ENCOUNTER — PATIENT MESSAGE (OUTPATIENT)
Dept: FAMILY MEDICINE CLINIC | Age: 56
End: 2021-08-30

## 2021-08-30 VITALS
SYSTOLIC BLOOD PRESSURE: 112 MMHG | TEMPERATURE: 98.9 F | HEIGHT: 72 IN | WEIGHT: 315 LBS | BODY MASS INDEX: 42.66 KG/M2 | OXYGEN SATURATION: 93 % | HEART RATE: 107 BPM | DIASTOLIC BLOOD PRESSURE: 44 MMHG | RESPIRATION RATE: 20 BRPM

## 2021-08-30 DIAGNOSIS — M25.572 ACUTE LEFT ANKLE PAIN: ICD-10-CM

## 2021-08-30 DIAGNOSIS — M79.672 LEFT FOOT PAIN: Primary | ICD-10-CM

## 2021-08-30 DIAGNOSIS — M1A.30X0 CHRONIC GOUT DUE TO RENAL IMPAIRMENT WITHOUT TOPHUS, UNSPECIFIED SITE: Primary | ICD-10-CM

## 2021-08-30 LAB
ABSOLUTE EOS #: 0.2 K/UL (ref 0–0.4)
ABSOLUTE IMMATURE GRANULOCYTE: ABNORMAL K/UL (ref 0–0.3)
ABSOLUTE LYMPH #: 0.7 K/UL (ref 1–4.8)
ABSOLUTE MONO #: 0.9 K/UL (ref 0.1–1.3)
ANION GAP SERPL CALCULATED.3IONS-SCNC: 13 MMOL/L (ref 9–17)
BASOPHILS # BLD: 0 % (ref 0–2)
BASOPHILS ABSOLUTE: 0 K/UL (ref 0–0.2)
BUN BLDV-MCNC: 47 MG/DL (ref 6–20)
BUN/CREAT BLD: ABNORMAL (ref 9–20)
C-REACTIVE PROTEIN: 90 MG/L (ref 0–5)
CALCIUM SERPL-MCNC: 9.1 MG/DL (ref 8.6–10.4)
CHLORIDE BLD-SCNC: 96 MMOL/L (ref 98–107)
CO2: 26 MMOL/L (ref 20–31)
CREAT SERPL-MCNC: 1.9 MG/DL (ref 0.7–1.2)
DIFFERENTIAL TYPE: ABNORMAL
EOSINOPHILS RELATIVE PERCENT: 2 % (ref 0–4)
GFR AFRICAN AMERICAN: 45 ML/MIN
GFR NON-AFRICAN AMERICAN: 37 ML/MIN
GFR SERPL CREATININE-BSD FRML MDRD: ABNORMAL ML/MIN/{1.73_M2}
GFR SERPL CREATININE-BSD FRML MDRD: ABNORMAL ML/MIN/{1.73_M2}
GLUCOSE BLD-MCNC: 201 MG/DL (ref 70–99)
HCT VFR BLD CALC: 30.1 % (ref 41–53)
HEMOGLOBIN: 10 G/DL (ref 13.5–17.5)
IMMATURE GRANULOCYTES: ABNORMAL %
LYMPHOCYTES # BLD: 6 % (ref 24–44)
MCH RBC QN AUTO: 27.6 PG (ref 26–34)
MCHC RBC AUTO-ENTMCNC: 33.1 G/DL (ref 31–37)
MCV RBC AUTO: 83.3 FL (ref 80–100)
MONOCYTES # BLD: 7 % (ref 1–7)
NRBC AUTOMATED: ABNORMAL PER 100 WBC
PDW BLD-RTO: 16.6 % (ref 11.5–14.9)
PLATELET # BLD: 225 K/UL (ref 150–450)
PLATELET ESTIMATE: ABNORMAL
PMV BLD AUTO: 8.4 FL (ref 6–12)
POTASSIUM SERPL-SCNC: 4.9 MMOL/L (ref 3.7–5.3)
RBC # BLD: 3.62 M/UL (ref 4.5–5.9)
RBC # BLD: ABNORMAL 10*6/UL
SEDIMENTATION RATE, ERYTHROCYTE: 50 MM (ref 0–20)
SEG NEUTROPHILS: 85 % (ref 36–66)
SEGMENTED NEUTROPHILS ABSOLUTE COUNT: 10.7 K/UL (ref 1.3–9.1)
SODIUM BLD-SCNC: 135 MMOL/L (ref 135–144)
TOTAL CK: 163 U/L (ref 39–308)
URIC ACID: 11.5 MG/DL (ref 3.4–7)
WBC # BLD: 12.6 K/UL (ref 3.5–11)
WBC # BLD: ABNORMAL 10*3/UL

## 2021-08-30 PROCEDURE — 80048 BASIC METABOLIC PNL TOTAL CA: CPT

## 2021-08-30 PROCEDURE — 73630 X-RAY EXAM OF FOOT: CPT

## 2021-08-30 PROCEDURE — 73610 X-RAY EXAM OF ANKLE: CPT

## 2021-08-30 PROCEDURE — 84550 ASSAY OF BLOOD/URIC ACID: CPT

## 2021-08-30 PROCEDURE — 82550 ASSAY OF CK (CPK): CPT

## 2021-08-30 PROCEDURE — 6370000000 HC RX 637 (ALT 250 FOR IP): Performed by: PHYSICIAN ASSISTANT

## 2021-08-30 PROCEDURE — 85652 RBC SED RATE AUTOMATED: CPT

## 2021-08-30 PROCEDURE — 99284 EMERGENCY DEPT VISIT MOD MDM: CPT

## 2021-08-30 PROCEDURE — 85025 COMPLETE CBC W/AUTO DIFF WBC: CPT

## 2021-08-30 PROCEDURE — 86140 C-REACTIVE PROTEIN: CPT

## 2021-08-30 PROCEDURE — 36415 COLL VENOUS BLD VENIPUNCTURE: CPT

## 2021-08-30 RX ORDER — ALLOPURINOL 100 MG/1
100 TABLET ORAL DAILY
Qty: 90 TABLET | Refills: 3 | Status: SHIPPED
Start: 2021-08-30 | End: 2021-12-09 | Stop reason: DRUGHIGH

## 2021-08-30 RX ORDER — OXYCODONE HYDROCHLORIDE 5 MG/1
10 TABLET ORAL ONCE
Status: COMPLETED | OUTPATIENT
Start: 2021-08-30 | End: 2021-08-30

## 2021-08-30 RX ADMIN — OXYCODONE HYDROCHLORIDE 10 MG: 5 TABLET ORAL at 10:31

## 2021-08-30 ASSESSMENT — PAIN DESCRIPTION - PAIN TYPE: TYPE: ACUTE PAIN

## 2021-08-30 ASSESSMENT — PAIN SCALES - GENERAL
PAINLEVEL_OUTOF10: 6
PAINLEVEL_OUTOF10: 10

## 2021-08-30 ASSESSMENT — PAIN DESCRIPTION - ORIENTATION: ORIENTATION: LEFT

## 2021-08-30 ASSESSMENT — PAIN DESCRIPTION - LOCATION: LOCATION: FOOT;ANKLE

## 2021-08-30 NOTE — ED PROVIDER NOTES
[address] [facility]  Emergency Department  Independent Attestation     Pt Name: Liana Booth. MRN: [id number]  [place] 1965  Date of evaluation: 21       Jovany Hanson. is a 63 y.o. male who presents with Ankle Pain and Foot Pain      I was personally available for consultation in the Emergency Department.     José Manuel Conner DO  Attending Emergency Physician  [address] ED      (Please note that portions of this note were completed with a voice recognition program.  Efforts were made to edit the dictations but occasionally words are mis-transcribed.)        José Manuel Conner DO  21 1014

## 2021-08-30 NOTE — ED NOTES
Mode of arrival (squad #, walk in, police, etc) : walk in ( W/C)        Chief complaint(s): Left foot pain        Arrival Note (brief scenario, treatment PTA, etc). : patient arrived to ED from home with C/O left foot pain. Patient denies any falls, injuries or past surgeries on the left foot. However does report neuropathy in B/L LE, and lymphedema. Patient reports pain a 7/10 on a pain scale, and pain with weight bearing. Nothing makes the pain better. C= \"Have you ever felt that you should Cut down on your drinking? \"  No  A= \"Have people Annoyed you by criticizing your drinking? \"  No  G= \"Have you ever felt bad or Guilty about your drinking? \"  No  E= \"Have you ever had a drink as an Eye-opener first thing in the morning to steady your nerves or to help a hangover? \"  No      Deferred []      Reason for deferring: N/A    *If yes to two or more: probable alcohol abuse. Alexsandra Browne RN  08/30/21 1037

## 2021-08-30 NOTE — TELEPHONE ENCOUNTER
From: Radha Perez. To: Indira Jones MD  Sent: 8/30/2021 2:01 PM EDT  Subject: Non-Urgent Medical Question    Can I get something called in for Gout ?

## 2021-08-30 NOTE — ED PROVIDER NOTES
[address]  eMERGENCY dEPARTMENT eNCOUnter      Pt Name: Federico Vidal MRN: [id number]  Birthdate 1965  Date of evaluation: 8/30/2021  Provider: Ede Doan PA-C    CHIEF COMPLAINT       Chief Complaint   Patient presents with    Ankle Pain    Foot Pain           HISTORY OF PRESENT ILLNESS  (Location/Symptom, Timing/Onset, Context/Setting, Quality, Duration, Modifying Factors, Severity.)   Federico Vidal is a 63 y.o. male who presents to the emergency department for evaluation of left foot and ankle pain. Pt states the pain began early Sunday morning. Pt reports he got up around 2am to use the restroom. Reports when he went to get up at 5am he could not put any weight on his foot. Pt is unsure what happened but states he does take a lot of medication and he will get dizzy often. Pt unsure if he hit it on something. States he is having severe pain over lateral side of foot and ankle. Admits to swelling. Denies calf pain. Reports hx of neuropathy. No change from baseline. Denies fever, chills, emesis. No other complaints. Nursing Notes were reviewed. REVIEW OF SYSTEMS    (2-9 systems for level 4, 10 or more for level 5)     Review of Systems   Foot pain  Ankle pain      Except as noted above the remainder of the review of systems was reviewed and negative.        PAST MEDICAL HISTORY     Past Medical History:   Diagnosis Date    Acute on chronic kidney failure ([address] 7/20/2017    Acute on chronic respiratory failure ([address] 10/2/2018    Adhesive capsulitis of left shoulder 3/25/2017    Anxiety 10/02/2016    smokes marijuana for this    Arthropathy, unspecified, other specified sites 6/13/2013    Asthma     B12 deficiency     Bilateral lower leg cellulitis 2/17/2016    Blood in stool     CAD (coronary artery disease)     Cellulitis of both lower extremities 5/25/2017    Cellulitis of leg, left 07/20/2017    CHF (congestive heart failure), NYHA class III ([address] 8/14/2013    Chronic back pain     Chronic bronchitis (HCC)     Chronic headaches     was referred to neuro, testing scheduled    Chronic respiratory failure ([address]     was on vent    Chronic ulcer of left leg, with fat layer exposed ([address] 02/22/2019    healed    Class 2 severe obesity due to excess calories with serious comorbidity and body mass index (BMI) of 35.0 to 35.9 in adult (HCC)     (BMI 35.0-39.9 without comorbidity)    COPD exacerbation ([address] 11/2/2016    Diabetic neuropathy ([address] 8/14/2013    Displacement of lumbar intervertebral disc without myelopathy 6/13/2013    Ear infection     RIGHT    Essential hypertension     Facial cellulitis 2012    Fall 3/25/2017    GERD (gastroesophageal reflux disease)     Head injury     Hearing loss in right ear     pencil pierced ear as a child    Hepatic steatosis 12/3/2015    History of general anesthesia complication     has woke up during surgery under anesthesia    History of rib fracture 12/3/2015    Chronic     Hyperlipidemia     Hypersomnia     can go multiple days without sleeping    Hypertension     Insomnia     Intolerance of continuous positive airway pressure (CPAP) ventilation 7/20/2017    Iron deficiency     Localized rash     gets frequently in axilla, groin, in any fold, on several topical treatments for this    Magnesium deficiency     Mastoiditis of left side     Mixed conductive and sensorineural hearing loss of both ears 1/10/2017    Per ENT    Mixed type COPD (chronic obstructive pulmonary disease) ([address]     On home O2, multiple inhlaers, nebulizer    Moderate recurrent major depression ([address] 10/2/2016    Morbid obesity with BMI of 45.0-49.9, adult ([address] 6/16/2015    On home oxygen therapy     3 Lpm prn    Open wound of groin 12/19/2018    healed     BARBY on CPAP     Osteoarthritis     Otitis externa of left ear     Pancreatitis chronic     Persistent depressive disorder 11/19/2019    Renal insufficiency proteinuria    Severe depression ([address] 9/25/2013    Spinal stenosis of lumbar region without neurogenic claudication 1/6/2016    MRI lumbar 12/30/15 L3-L4: There is broad-based bulging disc which appears protruding left laterally causing flattening of the ventral thecal sac. In addition, there is facet arthropathy with mild hypertrophic changes.  There is borderline central canal stenosis with  evidence of moderate left neural foraminal narrowing and mild right neural foramina narrowing.   L4-L5: There is broad-based protrud    Syncope 4/28/2017    Tinnitus of both ears 1/10/2017    Per ENT    Type 2 diabetes mellitus with stage 3 chronic kidney disease, with long-term current use of insulin ([address] 12/26/2016    due to underlying condition with hyperosmolarity without coma    Type II or unspecified type diabetes mellitus without mention of complication, not stated as uncontrolled     uncontrolled    Vitamin D deficiency     Wears glasses     for reading     None otherwise stated in nurses notes    SURGICAL HISTORY       Past Surgical History:   Procedure Laterality Date    BACK SURGERY   (x 4) 2000,.12/2011.2/2012     Dr Linda Gallagher last 2 surg    CARDIAC CATHETERIZATION  04/23/2018    no stenting    COLONOSCOPY  11/3/2015    hemorrhoids, poor prep, not done    COLONOSCOPY  2013    COLONOSCOPY N/A 4/12/2021    COLONOSCOPY POLYPECTOMY SNARE/COLD BIOPSY/HOT BIOPSY/CLIP APPLICATION X1 performed by Jona Soria MD at [address]  March 2013    x 1    HAND TENDON SURGERY Left     thumb tendon repair    INTRACAPSULAR CATARACT EXTRACTION Right 11/5/2019    EYE CATARACT EMULSIFICATION IOL IMPLANT performed by Cherylene Funk, MD at [address] Left 1/7/2020    EYE CATARACT EMULSIFICATION IOL IMPLANT performed by Cherylene Funk, MD at [address] ARTHROSCOPY Left     NERVE BLOCK  07-31-15    TENS unit    DC ESOPHAGOGASTRODUODENOSCOPY TRANSORAL DIAGNOSTIC N/A 7/18/2018    EGD ESOPHAGOGASTRODUODENOSCOPY performed by Kory Fitzgerald MD at [facility] Bilateral 09/20/2012    Dr Krysta Mujica     None otherwise stated in nurses notes    [address]       Discharge Medication List as of 8/30/2021 12:53 PM      CONTINUE these medications which have NOT CHANGED    Details   vitamin D3 (CHOLECALCIFEROL) 25 MCG (1000 UT) TABS tablet Take 1 tablet by mouth daily, Disp-90 tablet, R-1Normal      docusate sodium (COLACE) 100 MG capsule Take 1 capsule by mouth 2 times daily For constipation, Disp-180 capsule, R-3Normal      latanoprost (XALATAN) 0.005 % ophthalmic solution 1 drop nightlyHistorical Med      gabapentin (NEURONTIN) 300 MG capsule Take 1 po bid, Disp-60 capsule, R-2Normal      butalbital-acetaminophen-caffeine (FIORICET, ESGIC) -40 MG per tablet Take 1 tablet by mouth every 8 hours as needed for Headaches, Disp-60 tablet, R-1Normal      oxyCODONE HCl (OXY-IR) 10 MG immediate release tablet Take 1 tablet by mouth every 8 hours as needed for Pain for up to 30 days. , Disp-90 tablet, R-0Normal      clopidogrel (PLAVIX) 75 MG tablet Take 1 tablet by mouth daily, Disp-90 tablet, R-3Normal      nitroGLYCERIN (NITROSTAT) 0.4 MG SL tablet DISSOLVE 1 TAB UNDER TONGUE FOR CHEST PAIN - IF PAIN REMAINS AFTER 5 MIN, CALL 911 AND REPEAT DOSE.  MAX 3 TABS IN 15 MINUTES, Disp-25 tablet, R-2Normal      pantoprazole (PROTONIX) 40 MG tablet Take 1 tablet by mouth every morning (before breakfast), Disp-90 tablet, R-1Normal      Insulin Syringe-Needle U-100 31G X 5/16\" 1 ML MISC Disp-300 each, R-2, NormalUse to subcutaneously inject insulin three times daily      insulin regular human (HUMULIN R) 500 UNIT/ML concentrated injection vial Patient using 0.52ml with breakfast, 0.52ml with lunch and 0.45ml with dinner., Disp-60 mL, R-3Normal      escitalopram (LEXAPRO) 10 MG tablet Take 1 tablet by mouth daily, Disp-90 tablet, R-3Normal      !! Continuous Blood Gluc Sensor (FREESTYLE CRISTINE 14 DAY SENSOR) MISC Use one sensor every 14 days, Disp-2 each, R-3Normal      rosuvastatin (CRESTOR) 10 MG tablet Take 1 tablet by mouth nightly Stop pravastatin, Disp-90 tablet, R-3Normal      !! Continuous Blood Gluc Sensor (FREESTYLE CRISTINE 2 SENSOR) [facility] [place] to apply a new sensor every 14 days. RX to cover voucher patient will bring in., Disp-2 each, R-0RX to cover voucher patient will bring in due to sensor malfunction. Normal      ammonium lactate (LAC-HYDRIN) 12 % cream Apply topically to dry skin BID., Disp-1 Bottle, R-4, Normal      sulfacetamide (BLEPH-10) 10 % ophthalmic solution Historical Med      mupirocin (BACTROBAN) 2 % ointment Apply topically 2 times daily on the affected area for 7-10 days. OK to substitute to cream, Disp-30 g, R-2, Normal      Gauze Pads & Dressings 4\"X4\" PADS Disp-60 each, R-5, PrintTwice a day dressing of right leg wound      Gauze Bandages (ROLLED GAUZE BANDAGE 4\"X2. 5YD) MISC Disp-60 each, R-5, PrintFor twice a day dressing      PROAIR  (90 Base) MCG/ACT inhaler INHALE TWO PUFFS BY MOUTH EVERY 6 HOURS AS NEEDED FOR WHEEZING OR FOR SHORTNESS OF BREATH, Disp-8.5 g, R-3Normal      amitriptyline (ELAVIL) 50 MG tablet Take 2 po qhs, Disp-60 tablet, R-5Normal      nystatin (MYCOSTATIN) 916603 UNIT/GM powder Apply 2 times daily in the skin folds for several months, Disp-1 Bottle, R-3, Normal      tiZANidine (ZANAFLEX) 4 MG tablet TAKE ONE TABLET BY MOUTH EVERY 8 HOURS AS NEEDED FOR BACK PAIN, Disp-90 tablet, R-5Normal      bumetanide (BUMEX) 1 MG tablet TAKE THREE TABLETS BY MOUTH TWICE A DAY, Disp-540 tablet, R-2Normal      spironolactone (ALDACTONE) 50 MG tablet Take 1 tablet by mouth daily, Disp-90 tablet, R-3Normal      ammonium lactate (LAC-HYDRIN) 12 % lotion Apply topically daily.  For dry skin, Disp-1 Bottle, R-2, Normal      clobetasol (TEMOVATE) 0.05 % ointment Apply topically 2 times daily for psoriasis, Disp-1 Tube, R-3, Normal      ketoconazole (NIZORAL) 2 % cream Apply twice a day for yeast infection in the skin folds, for 4 weeks, Disp-1 Tube, R-3, Normal      albuterol (PROVENTIL) (2.5 MG/3ML) 0.083% nebulizer solution Take 3 mLs by nebulization every 6 hours as needed for Wheezing or Shortness of Breath, Disp-120 vial, R-3Normal      Ferrous Sulfate (IRON) 325 (65 Fe) MG TABS Take 1 tablet by mouth daily, Disp-90 tablet,R-3Normal      metoprolol tartrate (LOPRESSOR) 50 MG tablet Take 1 tablet by mouth 2 times daily, Disp-180 tablet,R-3Normal      magnesium oxide (MAG-OX) 400 (240 Mg) MG tablet Take 1 tablet by mouth daily, Disp-90 tablet,R-3Normal      lisinopril (PRINIVIL;ZESTRIL) 5 MG tablet Take 1 tablet by mouth daily . Discontinued Lisinopril  10 mg, Disp-90 tablet,R-3Normal      fluticasone-salmeterol (ADVAIR HFA) 230-21 MCG/ACT inhaler Inhale 2 puffs into the lungs 2 times daily, Disp-12 g,R-11Normal      tiotropium (SPIRIVA RESPIMAT) 2.5 MCG/ACT AERS inhaler Inhale 2 puffs into the lungs daily, Disp-12 g,R-11Normal      venlafaxine (EFFEXOR XR) 75 MG extended release capsule Take 1 capsule by mouth daily Take with food, Disp-90 capsule,R-3Normal      naloxone 4 MG/0.1ML LIQD nasal spray 1 spray by Nasal route as needed for Opioid Reversal Due to COPD and sleep apnea, this is a big recommendation for you, Disp-1 each,R-5Normal      blood glucose monitor strips Test 3 times a day & as needed for symptoms of irregular blood glucose. Dispense sufficient amount for indicated testing frequency plus additional to accommodate PRN testing needs. One touch Ultra blue, Disp-300 strip,R-0, Phone In      !! Lancets MISC Disp-300 each,R-2, Phone InUse to check blood sugar three times daily along with when necessary due to symptoms.       vitamin B-12 (CYANOCOBALAMIN) 500 MCG tablet Take 1 tablet by mouth daily, Disp-90 tablet,R-3Normal      Oxygen Tubing MISC Starting Thu 3/26/2020, Disp-1 each, R-0, PrintDX COPD. chronic respiratory failure      Respiratory Therapy Supplies (NEBULIZER/TUBING/MOUTHPIECE) KIT Disp-1 kit, R-11, PrintDx COPD needs nebulizer supplies      !! ONE TOUCH ULTRASOFT LANCETS MISC Disp-300 each, R-3, Phone InPatient to test blood sugar up to 4 times daily. Lancet Devices (LANCING DEVICE) MISC Disp-1 each, R-1, NormalProvide patient with lancing device appropriate for his machine/lancing needles. Lidocaine 4 % LOTN Apply topicallyHistorical Med      Spacer/Aero Chamber Mouthpiece MISC ONCE PRN Starting Mon 4/15/2019, Until 2021 at 2359, For 1 dose, Disp-1 each, R-0, Normal      metolazone (ZAROXOLYN) 2.5 MG tablet Take 2.5 mg by mouth three times a week MWFHistorical Med      Handicap Placard MISC Starting 2019, Disp-1 each, R-0, PrintCan't walk greater than 200 feet. Expires in 5 years. fluticasone (CUTIVATE) 0.05 % cream Apply topically 2 times daily , Topical, 2 TIMES DAILY Starting 2017, Historical Med      fluticasone (FLONASE) 50 MCG/ACT nasal spray 2 sprays by Nasal route daily, Disp-1 Bottle, R-3      Melatonin 10 MG TABS Take 10 mg by mouth nightly as needed (insomnia), Disp-90 tablet, R-1      aspirin 81 MG EC tablet Take 81 mg by mouth daily. Historical Med       !! - Potential duplicate medications found. Please discuss with provider.           ALLERGIES     Levofloxacin, Lorazepam, Nsaids, Prozac [fluoxetine hcl], and Vancomycin    FAMILY HISTORY           Problem Relation Age of Onset    Heart Disease Mother          age 64 from MI   Dahlia Osvaldo High Blood Pressure Mother     Diabetes Mother     High Blood Pressure Father          age 80 from CKD and Lung Fibrosis    Kidney Disease Father     Heart Disease Sister     Heart Attack Sister     Obesity Sister     Diabetes Sister      Family Status   Relation Name Status    Mother  (Not Specified)    Father  (Not Specified)    Sister  (Not Specified)      None otherwise stated in nurses notes    SOCIAL HISTORY      reports that he quit smoking about 9 months ago. His smoking use included cigarettes. He started smoking about 36 years ago. He has a 8.25 pack-year smoking history. He quit smokeless tobacco use about 26 years ago. His smokeless tobacco use included snuff. He reports previous drug use. Drug: Marijuana. He reports that he does not drink alcohol. lives at home with others     PHYSICAL EXAM    (up to 7 for level 4, 8 or more for level 5)     ED Triage Vitals [08/30/21 1005]   BP Temp Temp Source Pulse Resp SpO2 Height Weight   (!) 112/44 98.9 °F (37.2 °C) Oral 107 20 93 % 6' (1.829 m) (!) 410 lb (186 kg)       Physical Exam  Vitals and nursing note reviewed. Constitutional:       Appearance: Normal appearance. He is obese. HENT:      Head: Normocephalic. Cardiovascular:      Pulses:           Dorsalis pedis pulses are detected w/ Doppler on the left side. Posterior tibial pulses are detected w/ Doppler on the left side. Heart sounds: Normal heart sounds. Pulmonary:      Effort: Pulmonary effort is normal.      Breath sounds: Normal breath sounds. Abdominal:      Tenderness: There is no abdominal tenderness. There is no guarding or rebound. Musculoskeletal:      Left lower leg: No deformity, lacerations, tenderness or bony tenderness. Edema (chronic) present. Left ankle: Swelling present. No deformity, ecchymosis or lacerations. Tenderness present over the lateral malleolus and medial malleolus. Normal range of motion. Anterior drawer test negative. Normal pulse. Left Achilles Tendon: Normal.      Left foot: Normal range of motion and normal capillary refill. Swelling, tenderness and bony tenderness present. No deformity, bunion, Charcot foot, foot drop, prominent metatarsal heads, laceration or crepitus. Normal pulse. Comments: Pulses found with doppler. There is no erythema, abrasions, ulcerations, warmth, drainage.   No calf GFR Non- 37 (*)     GFR  45 (*)     All other components within normal limits   URIC ACID - Abnormal; Notable for the following components:    Uric Acid 11.5 (*)     All other components within normal limits   SEDIMENTATION RATE - Abnormal; Notable for the following components:    Sed Rate 50 (*)     All other components within normal limits   C-REACTIVE PROTEIN - Abnormal; Notable for the following components:    CRP 90.0 (*)     All other components within normal limits   CK       All other labs were within normal range or not returned as of this dictation. EMERGENCY DEPARTMENT COURSE and DIFFERENTIAL DIAGNOSIS/MDM:   Vitals:    Vitals:    08/30/21 1005   BP: (!) 112/44   Pulse: 107   Resp: 20   Temp: 98.9 °F (37.2 °C)   TempSrc: Oral   SpO2: 93%   Weight: (!) 410 lb (186 kg)   Height: 6' (1.829 m)         Patient instructed to return to the emergency room if symptoms worsen, return, or any other concern right away which is agreed by the patient    ED MEDS:  Orders Placed This Encounter   Medications    oxyCODONE (ROXICODONE) immediate release tablet 10 mg         CONSULTS:  None    PROCEDURES:  None      FINAL IMPRESSION      1. Left foot pain    2. Acute left ankle pain          DISPOSITION/PLAN   DISPOSITION Decision To Discharge    PATIENT REFERRED TO:  Lennox [place], MD  [address] ED  [facility] [address]  [phone]          DISCHARGE MEDICATIONS:  Discharge Medication List as of 8/30/2021 12:53 PM            Summation      Patient Course:    Left foot and ankle pain. Began early Sunday morning. Unsure if there was an injury. Swelling noted to foot and ankle with very severe pain with light touch. There is no erythema, bruising, abrasions. No calf pain. Low concern for DVT   No signs of infection. Imaging is unremarkable.    Will get labs to check for infection, gout. Pt states  He does not want to wait any longer. Informed patient he still has some labs pending. He does not want to wait and wants to go home. He will call PCP. Informed patient he can check results on mychart. Discussed results and plan with the pt. They expressed appropriate understanding. Pt given close follow up, supportive care instructions and strict return instructions at the bedside. ED Medications administered this visit:    Medications   oxyCODONE (ROXICODONE) immediate release tablet 10 mg (10 mg Oral Given 8/30/21 1031)       New Prescriptions from this visit:    Discharge Medication List as of 8/30/2021 12:53 PM          Follow-up:  Walker Tuttle MD  [address] [place] [id number]  [phone]            Final Impression:   1. Left foot pain    2.  Acute left ankle pain               (Please note that portions of this note were completed with a voice recognition program )        Grady Jay. Kadeem [address], PA-C  08/30/21 [id number]

## 2021-08-31 ENCOUNTER — TELEPHONE (OUTPATIENT)
Dept: FAMILY MEDICINE CLINIC | Age: 56
End: 2021-08-31

## 2021-08-31 ENCOUNTER — TELEPHONE (OUTPATIENT)
Dept: ONCOLOGY | Age: 56
End: 2021-08-31

## 2021-08-31 NOTE — TELEPHONE ENCOUNTER
WILIAN DAUGHTER CALLED AND WANTED TO RESCHEDULE [facility] HE NEEDS TO RESCHEDULE HIS APPT I RESCHEDULED HIS APPT FOR 9/21/21 @ 8:45AM.PT DAUGHTER WAS OK WITH THIS APPT.

## 2021-08-31 NOTE — TELEPHONE ENCOUNTER
NEEDS ED follow up WITH ANY OF PROVIDERS FOR LEG SCOOTER DOCUMENTATION  ORDER HIM ONE OF THOSE LEG SCOOTERS SO HE COULD AT LEAST GET TO THE RESTROOM BUT THEY DECLINED IT = PLEASE ADVISE )      Future Appointments   Date Time Provider William Givensi   9/7/2021  9:50 AM Ras Victor MD .  [facility]   9/21/2021  8:45 AM [facility]   9/21/2021 10:10 AM Ras Victor MD .  [facility]   10/21/2021  9:00 AM Reina Gibson MD [facility]   11/9/2021  8:40 AM Esperanza Chase MD Neuro Spec [facility] N[facility]

## 2021-09-02 NOTE — PROGRESS NOTES
[address]  Mauro Alcantar [facility] [address].  [address] [address]  Dept: [address] (:  1965) is a 63 y.o. male. Patient is here for evaluation of the following chief complaint(s):  Chief Complaint   Patient presents with    ED Follow-up     left ankle     Other     scooter        SUBJECTIVE/OBJECTIVE:  SNEHAL Mcwilliams. is a 63 y.o. male patient. Patient is an established patient of Dr. Soraida Staton . Patient has a known history of recent diagnosis of gout, low back pain, postlaminectomy syndrome, spinal stenosis, degenerative disc disease, recurrent falls, venous stasis dermatitis, and morbid obesity. .    Patient came in with his daughter today. He reports is the only person that helps him from the house. He does not have any help. The daughter is going to have a knee surgery which time spent helpingthe patient with most of his ADL's    Patient has had some mobility impairment due to the chronic low back pain that he has had for a while. Patient has had several surgeries and has seen several specialist for this. He continues to have lumbar pain that he describes as constant achy stiffness and sometimes sharp type of pain patient also reports some pain radiation to both of his legs. He also reports some numbness and tingling and have always had issues with falls. In fact, his most recent emergency room visit was due to a fall. Patient was seen for a left ankle, left foot pain due to a fall. X-rays were done and they were normal.  Uric acid was found elevated. Patient has a known history of frequent falls and venous stasis. Patient was vascular specialist.  Patient has had Doppler studies and was told not he does not have any blood clots. Patient was told that there is no need follow up. Dr Arturo Rojo. ED VISIT    CAD/COPD  Patient has a known history of coronary artery disease and COPD.   He does have some chronic chest exacerbated with increased upper arm work with self propulsion. The patient is capable of using a Power Mobility Device (electric wheelchair) in their home and can maneuver within their home with adequate access. There is a caregiver available to provide necessary assistance if needed. The need for this equipment was discussed with the patient and he understands, is in agreement, and has not expressed an unwillingness to use the wheelchair. The patient will also have a physical therapy evaluation for a more detailed mobility functionality examination. I believe that having this device will greatly improve this patient's quality of life. OBESITY  Patient's BMI is Body mass index is 57.5 kg/m². kg/m2. BMI is increasing. Patient understands that this condition increases the patient's risk for chronic conditions. Patient advised to practice healthy eating habits. Diet should include plenty of fruits, vegetables, whole grains, lean protein, and low-fat dairy. Increase water consumption. Reduce consumption of dietary sugar and sugar-sweetened beverages. Increasing physical exercises at least 3-4 times a week. We will monitor progression of condition. VITAL SIGNS:  Vitals:    09/03/21 1031   BP: 138/84   Pulse: 91   Temp: 98.4 °F (36.9 °C)   TempSrc: Temporal   SpO2: 93%   Weight: (!) 424 lb (192.3 kg)   Height: 6' (1.829 m)   Estimated body mass index is 57.5 kg/m² as calculated from the following:    Height as of this encounter: 6' (1.829 m). Weight as of this encounter: 424 lb (192.3 kg). Review of Systems   Constitutional: Positive for activity change, fatigue and unexpected weight change. Negative for chills and fever. Respiratory: Positive for chest tightness and shortness of breath. Negative for wheezing. Cardiovascular: Positive for leg swelling. Negative for chest pain and palpitations. Gastrointestinal: Negative for abdominal pain. Genitourinary: Negative for dysuria. Musculoskeletal: Positive for arthralgias, back pain, gait problem, joint swelling and myalgias. Neurological: Positive for weakness and numbness. Negative for headaches. Psychiatric/Behavioral: Positive for behavioral problems, dysphoric mood and sleep disturbance. Negative for suicidal ideas. The patient is nervous/anxious. Physical Exam  Vitals and nursing note reviewed. HENT:      Head: Normocephalic. Nose: Nose normal.   Cardiovascular:      Rate and Rhythm: Normal rate and regular rhythm. Pulmonary:      Effort: Pulmonary effort is normal.      Breath sounds: Normal breath sounds. Abdominal:      General: Abdomen is protuberant. Bowel sounds are normal.      Palpations: Abdomen is soft. Comments: Morbidly obese                        Musculoskeletal:      Cervical back: Deformity present. Decreased range of motion. Thoracic back: Deformity present. Decreased range of motion. Lumbar back: Deformity present. Decreased range of motion. Positive right straight leg raise test and positive left straight leg raise test.      Right lower le+ Pitting Edema present. Left lower le+ Pitting Edema present. Left ankle: Tenderness present over the lateral malleolus, medial malleolus and posterior TF ligament. Comments: Warmth to touch, inflamed   Skin:     General: Skin is warm and dry. Comments: Hyperkeratotic areas on both legs   Neurological:      Mental Status: He is alert and oriented to person, place, and time. Motor: Weakness present. Comments: On a wheelchair   Psychiatric:         Mood and Affect: Mood is anxious. Speech: Speech is rapid and pressured. Behavior: Behavior is agitated and aggressive. Thought Content: Thought content does not include suicidal ideation.          MEDICAL HISTORY      Diagnosis Date    Acute on chronic kidney failure ([facility] [address] 2017    Acute on chronic respiratory failure ([facility] [address] 10/2/2018    Adhesive capsulitis of left shoulder 3/25/2017    Anxiety 10/02/2016    smokes marijuana for this    Arthropathy, unspecified, other specified sites 6/13/2013    Asthma     B12 deficiency     Bilateral lower leg cellulitis 2/17/2016    Blood in stool     CAD (coronary artery disease)     Cellulitis of both lower extremities 5/25/2017    Cellulitis of leg, left 07/20/2017    CHF (congestive heart failure), NYHA class III (HCC) 8/14/2013    Chronic back pain     Chronic bronchitis ([facility])     Chronic headaches     was referred to neuro, testing scheduled    Chronic respiratory failure ([address]     was on vent    Chronic ulcer of left leg, with fat layer exposed ([address] 02/22/2019    healed    Class 2 severe obesity due to excess calories with serious comorbidity and body mass index (BMI) of 35.0 to 35.9 in adult ([address]     (BMI 35.0-39.9 without comorbidity)    COPD exacerbation ([address] 11/2/2016    Diabetic neuropathy ([facility] [address] 8/14/2013    Displacement of lumbar intervertebral disc without myelopathy 6/13/2013    Ear infection     RIGHT    Essential hypertension     Facial cellulitis 2012    Fall 3/25/2017    GERD (gastroesophageal reflux disease)     Head injury     Hearing loss in right ear     pencil pierced ear as a child    Hepatic steatosis 12/3/2015    History of general anesthesia complication     has woke up during surgery under anesthesia    History of rib fracture 12/3/2015    Chronic     Hyperlipidemia     Hypersomnia     can go multiple days without sleeping    Hypertension     Insomnia     Intolerance of continuous positive airway pressure (CPAP) ventilation 7/20/2017    Iron deficiency     Localized rash     gets frequently in axilla, groin, in any fold, on several topical treatments for this    Magnesium deficiency     Mastoiditis of left side     Mixed conductive and sensorineural hearing loss of both ears 1/10/2017    Per ENT    Mixed type COPD (chronic obstructive pulmonary disease) ([facility] [address]     On home O2, multiple inhlaers, nebulizer    Moderate recurrent major depression ([facility] [address] 10/2/2016    Morbid obesity with BMI of 45.0-49.9, adult ([facility] [address] 6/16/2015    On home oxygen therapy     3 Lpm prn    Open wound of groin 12/19/2018    healed     BARBY on CPAP     Osteoarthritis     Otitis externa of left ear     Pancreatitis chronic     Persistent depressive disorder 11/19/2019    Renal insufficiency     proteinuria    Severe depression ([facility] [address] 9/25/2013    Spinal stenosis of lumbar region without neurogenic claudication 1/6/2016    MRI lumbar 12/30/15 L3-L4: There is broad-based bulging disc which appears protruding left laterally causing flattening of the ventral thecal sac. In addition, there is facet arthropathy with mild hypertrophic changes. There is borderline central canal stenosis with  evidence of moderate left neural foraminal narrowing and mild right neural foramina narrowing.   L4-L5: There is broad-based protrud    Syncope 4/28/2017    Tinnitus of both ears 1/10/2017    Per ENT    Type 2 diabetes mellitus with stage 3 chronic kidney disease, with long-term current use of insulin ([facility] [address] 12/26/2016    due to underlying condition with hyperosmolarity without coma    Type II or unspecified type diabetes mellitus without mention of complication, not stated as uncontrolled     uncontrolled    Vitamin D deficiency     Wears glasses     for reading      MEDICATIONS  Prior to Visit Medications    Medication Sig Taking? Authorizing Provider   predniSONE (DELTASONE) 10 MG tablet Take 4 tabs X 3 days, then 3 tabs X 3 days, then 2 tabs X 3 days, then 1 tab X 3 days Yes Esperanza Lucia, APRN - CNP   allopurinol (ZYLOPRIM) 100 MG tablet Take 1 tablet by mouth daily FOR GOUT. STOP IT IF ANY RASH DEVELOPS!  Yes Emmie Rosenthal MD   vitamin D3 (CHOLECALCIFEROL) 25 MCG (1000 UT) TABS tablet Take 1 tablet by mouth daily Yes Emmie Rosenthal MD   docusate sodium (COLACE) 100 MG capsule Take 1 capsule by mouth 2 times daily For constipation Yes Kyung Contreras MD   latanoprost (XALATAN) 0.005 % ophthalmic solution 1 drop nightly Yes Stewart Henriquez MD   gabapentin (NEURONTIN) 300 MG capsule Take 1 po bid Yes Marielle Murrieta MD   butalbital-acetaminophen-caffeine (FIORICET, ESGIC) -77 MG per tablet Take 1 tablet by mouth every 8 hours as needed for Headaches Yes Marielle Murrieta MD   oxyCODONE HCl (OXY-IR) 10 MG immediate release tablet Take 1 tablet by mouth every 8 hours as needed for Pain for up to 30 days. Yes Kyung Contreras MD   clopidogrel (PLAVIX) 75 MG tablet Take 1 tablet by mouth daily Yes Kyung Contreras MD   nitroGLYCERIN (NITROSTAT) 0.4 MG SL tablet DISSOLVE 1 TAB UNDER TONGUE FOR CHEST PAIN - IF PAIN REMAINS AFTER 5 MIN, CALL 911 AND REPEAT DOSE. MAX 3 TABS IN 15 MINUTES Yes Kyung Contreras MD   pantoprazole (PROTONIX) 40 MG tablet Take 1 tablet by mouth every morning (before breakfast) Yes Kyung Contreras MD   Insulin Syringe-Needle U-100 31G X 5/16\" 1 ML MISC Use to subcutaneously inject insulin three times daily Yes Kyung Contreras MD   insulin regular human (HUMULIN R) 500 UNIT/ML concentrated injection vial Patient using 0.52ml with breakfast, 0.52ml with lunch and 0.45ml with dinner. Yes Kyung Contreras MD   escitalopram (LEXAPRO) 10 MG tablet Take 1 tablet by mouth daily Yes Kyung Contreras MD   Continuous Blood Gluc Sensor (FREESTYLE CRISTINE 14 DAY SENSOR) MISC Use one sensor every 14 days Yes Kyung Contreras MD   rosuvastatin (CRESTOR) 10 MG tablet Take 1 tablet by mouth nightly Stop pravastatin Yes Kyung Contreras MD   Continuous Blood Gluc Sensor (FREESTYLE CRISTINE 2 SENSOR) [facility] Patient to apply a new sensor every 14 days. RX to cover voucher patient will bring in. Yes Kyung Contreras MD   ammonium lactate (LAC-HYDRIN) 12 % cream Apply topically to dry skin BID.  Yes Kristofer Ward Ulisses Fernandez, JASIEL   sulfacetamide (BLEPH-10) 10 % ophthalmic solution  Yes Stewart Henriquez MD   mupirocin (BACTROBAN) 2 % ointment Apply topically 2 times daily on the affected area for 7-10 days. OK to substitute to cream Yes Rigo Salazar MD   Gauze Pads & Dressings 4\"X4\" PADS Twice a day dressing of right leg wound Yes Rigo Salazar MD   Gauze Bandages (ROLLED GAUZE BANDAGE 4\"X2. 5YD) MISC For twice a day dressing Yes Rigo Salazar MD   PROAIR  (90 Base) MCG/ACT inhaler INHALE TWO PUFFS BY MOUTH EVERY 6 HOURS AS NEEDED FOR WHEEZING OR FOR SHORTNESS OF BREATH Yes Rigo Salazar MD   amitriptyline (ELAVIL) 50 MG tablet Take 2 po qhs Yes Sofi Carmen MD   nystatin (MYCOSTATIN) 363165 UNIT/GM powder Apply 2 times daily in the skin folds for several months Yes Rigo Salazar MD   tiZANidine (ZANAFLEX) 4 MG tablet TAKE ONE TABLET BY MOUTH EVERY 8 HOURS AS NEEDED FOR BACK PAIN Yes Rigo Salazar MD   bumetanide (BUMEX) 1 MG tablet TAKE THREE TABLETS BY MOUTH TWICE A DAY Yes Rigo Salazar MD   spironolactone (ALDACTONE) 50 MG tablet Take 1 tablet by mouth daily Yes Rigo Salazar MD   ammonium lactate (LAC-HYDRIN) 12 % lotion Apply topically daily.  For dry skin Yes Rigo Salazar MD   clobetasol (TEMOVATE) 0.05 % ointment Apply topically 2 times daily for psoriasis Yes Rigo Salazar MD   ketoconazole (NIZORAL) 2 % cream Apply twice a day for yeast infection in the skin folds, for 4 weeks Yes Rigo Salazar MD   albuterol (PROVENTIL) (2.5 MG/3ML) 0.083% nebulizer solution Take 3 mLs by nebulization every 6 hours as needed for Wheezing or Shortness of Breath Yes Rigo Salazar MD   Ferrous Sulfate (IRON) 325 (65 Fe) MG TABS Take 1 tablet by mouth daily Yes Rigo Salazar MD   metoprolol tartrate (LOPRESSOR) 50 MG tablet Take 1 tablet by mouth 2 times daily Yes Rigo Salazar MD   magnesium oxide (MAG-OX) 400 (240 Mg) MG tablet Take 1 tablet by mouth daily Yes Leah Birch MD   lisinopril (PRINIVIL;ZESTRIL) 5 MG tablet Take 1 tablet by mouth daily . Discontinued Lisinopril  10 mg Yes Leah Birch MD   fluticasone-salmeterol (ADVAIR HFA) 230-21 MCG/ACT inhaler Inhale 2 puffs into the lungs 2 times daily Yes Leah Birch MD   tiotropium (SPIRIVA RESPIMAT) 2.5 MCG/ACT AERS inhaler Inhale 2 puffs into the lungs daily Yes Leah Birch MD   venlafaxine (EFFEXOR XR) 75 MG extended release capsule Take 1 capsule by mouth daily Take with food Yes Leah Birch MD   naloxone 4 MG/0.1ML LIQD nasal spray 1 spray by Nasal route as needed for Opioid Reversal Due to COPD and sleep apnea, this is a big recommendation for you Yes Leah Birch MD   blood glucose monitor strips Test 3 times a day & as needed for symptoms of irregular blood glucose. Dispense sufficient amount for indicated testing frequency plus additional to accommodate PRN testing needs. One touch Ultra blue Yes Pantera Garcia MD   Lancets MISC Use to check blood sugar three times daily along with when necessary due to symptoms. Yes Pantera Garcia MD   vitamin B-12 (CYANOCOBALAMIN) 500 MCG tablet Take 1 tablet by mouth daily Yes Leah Birch MD   Oxygen Tubing MISC by Does not apply route DX COPD. chronic respiratory failure Yes Leah Birch MD   Respiratory Therapy Supplies (NEBULIZER/TUBING/MOUTHPIECE) KIT Dx COPD needs nebulizer supplies Yes Leah Birch MD   ONE TOUCH ULTRASOFT LANCETS [address] [facility] Patient to test blood sugar up to 4 times daily. Yes Pantera Garcia MD   Lancet Devices (LANCING DEVICE) MISC Provide patient with lancing device appropriate for his machine/lancing needles.  Yes Leah Birch MD   Lidocaine 4 % LOTN Apply topically Yes Historical Provider, MD   metolazone (ZAROXOLYN) 2.5 MG tablet Take 2.5 mg by mouth three times a week MWF Yes Historical Provider, MD   Handicap Placard MISC by Does not apply route Can't walk greater than 200 feet. Expires in 5 years. Yes Lennox Oregon, MD   fluticasone (CUTIVATE) 0.05 % cream Apply topically 2 times daily  Yes Historical Provider, MD   fluticasone (FLONASE) 50 MCG/ACT nasal spray 2 sprays by Nasal route daily  Patient taking differently: 2 sprays by Nasal route daily as needed (sinus symptoms)  Yes Paige Bryant MD   Melatonin 10 MG TABS Take 10 mg by mouth nightly as needed (insomnia) Yes Lennox [place], MD   aspirin 81 MG EC tablet Take 81 mg by mouth daily. Yes Historical Provider, MD   Spacer/Aero Chamber Mouthpiece [address] 1 each by Does not apply route once as needed (to be used with his inhalers)  Lennox [place], MD     Controlled Substance Monitoring:  Acute and Chronic Pain Monitoring:   RX Monitoring 9/2/2021   Attestation -   Periodic Controlled Substance Monitoring Assessed functional status. ;Obtaining appropriate analgesic effect of treatment. Chronic Pain > 50 MEDD -   Chronic Pain > 80 MEDD -     ASSESSMENT/PLAN:  1. 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 DISCUSSED AND ADVISED TO:  Limit or avoid Foods High In Purine- list given  Such as Beer and other alcoholic beverages. Also gravies and sauces made with meat. Anchovies, sardines, caviar, herring, mussels, tuna, Codfish, Trout, [address], [place],   [employer], (such as liver or kidneys), tripe, sweet bread, wild game, goose,   High fructose corn syrup in foods and drinks    - predniSONE (DELTASONE) 10 MG tablet; Take 4 tabs X 3 days, then 3 tabs X 3 days, then 2 tabs X 3 days, then 1 tab X 3 days  Dispense: 30 tablet; Refill: 0  - [place] POW MOBIL DEV NO DMEPDAC    2. Coronary artery disease of native artery of native heart with stable angina pectoris ([address]  Stable  DISCUSSED and ADVISED TO:  Discussed serious causes of CP. Advised to go to the ER for worsening CP/SOB         - [place] POW MOBIL DEV NO DMEPDAC    3. Chronic obstructive pulmonary disease, unspecified COPD type ([facility] [address]  Stable  Current treatment plan is effective, no change in therapy. Reviewed use, techniques, schedule and side effects of all inhaled medications  Critical need for compliance with treatment plan to achieve optimal results  Very strongly urged to quit smoking to reduce pulmonary and CVD risk. - [place] POW MOBIL DEV NO DMEPDAC    4. Chronic midline low back pain with left-sided sciatica  Failure to Improve  Continue current therapy. DISCUSSED AND ADVISED TO:  Use heat packs 15 to 20 mins every 2-3 hours. Do some back stretches as tolerated. Refer to hand out for instructions. Call for worsening, numbness, weakness.      - [place] POW MOBIL DEV NO DMEPDAC    5. Postlaminectomy syndrome of lumbar region  Failure to Improve  Continue current therapy. DISCUSSED AND ADVISED TO:  Use heat packs 15 to 20 mins every 2-3 hours. Do some back stretches as tolerated. Refer to hand out for instructions. Call for worsening, numbness, weakness.  - [place] POW MOBIL DEV NO DMEPDAC    6. Spinal stenosis of lumbar region without neurogenic claudication  Failure to Improve  Continue current therapy. DISCUSSED AND ADVISED TO:  Use heat packs 15 to 20 mins every 2-3 hours. Do some back stretches as tolerated. Refer to hand out for instructions. Call for worsening, numbness, weakness.  - [place] POW MOBIL DEV NO DMEPDAC    7. DDD (degenerative disc disease), lumbar  Failure to Improve  Continue current therapy. DISCUSSED AND ADVISED TO:  Use heat packs 15 to 20 mins every 2-3 hours. Do some back stretches as tolerated. Refer to hand out for instructions. Call for worsening, numbness, weakness.  - [place] POW MOBIL DEV NO DMEPDAC    8. Mobility impaired  Worsening  Prescribe PMD  Declined PT  Continue to monitor    - [place] POW MOBIL DEV NO DMEPDAC    9. Recurrent falls  Failure to Improve  Declined Pt  Own manual WC  Needs PMD    - [place] POW MOBIL DEV NO DMEPDAC    10.  Venous stasis dermatitis of both lower extremities  Stable  Encouraged to ff up with vascular specialist    - [place] POW MOBIL DEV NO DMEPDAC    11. Morbid obesity with BMI of 50.0-59.9, adult (HCC)  BMI decreasing  DISCUSSED AND ADVISED TO:  Eat a low-fat and low carbohydrates diet. Avoid fried foods especially fast food. Choose healthier options for snacks. Have 5-6 servings of fruits and vegetables per day. Cut down on eating processed food. Add 30 minutes to 1 hour aerobic exercise for 3-4 days a week. - [place] POW MOBIL DEV NO DMEPDAC       Return for Chronic conditions, Appt w/ Dr. Carlo Camargo. On this date 9/3/2021 I have spent 45 minutes reviewing previous notes, test results and face to face with the patient discussing the diagnosis and importance of compliance with the treatment plan as well as documenting on the day of the visit. This note was completed by using the assistance of a speech-recognition program. However, inadvertent computerized transcription errors may be present. Although every effort was made to ensure accuracy, no guarantees can be provided that every mistake has been identified and corrected by editing.   Electronically signed by NICOLE Steve CNP on 9/2/21 at 5:18 PM EDT     --NICOLE Steve CNP

## 2021-09-03 ENCOUNTER — OFFICE VISIT (OUTPATIENT)
Dept: FAMILY MEDICINE CLINIC | Age: 56
End: 2021-09-03
Payer: COMMERCIAL

## 2021-09-03 ENCOUNTER — HOSPITAL ENCOUNTER (OUTPATIENT)
Dept: ULTRASOUND IMAGING | Age: 56
Discharge: HOME | End: 2021-09-05
Payer: COMMERCIAL

## 2021-09-03 VITALS
HEART RATE: 91 BPM | HEIGHT: 72 IN | BODY MASS INDEX: 42.66 KG/M2 | TEMPERATURE: 98.4 F | DIASTOLIC BLOOD PRESSURE: 84 MMHG | OXYGEN SATURATION: 93 % | WEIGHT: 315 LBS | SYSTOLIC BLOOD PRESSURE: 138 MMHG

## 2021-09-03 DIAGNOSIS — Z87.438 HISTORY OF HYDROCELE: ICD-10-CM

## 2021-09-03 DIAGNOSIS — M54.42 CHRONIC MIDLINE LOW BACK PAIN WITH LEFT-SIDED SCIATICA: ICD-10-CM

## 2021-09-03 DIAGNOSIS — M51.36 DDD (DEGENERATIVE DISC DISEASE), LUMBAR: ICD-10-CM

## 2021-09-03 DIAGNOSIS — J44.9 CHRONIC OBSTRUCTIVE PULMONARY DISEASE, UNSPECIFIED COPD TYPE (HCC): ICD-10-CM

## 2021-09-03 DIAGNOSIS — M1A.30X0 CHRONIC GOUT DUE TO RENAL IMPAIRMENT WITHOUT TOPHUS, UNSPECIFIED SITE: Primary | ICD-10-CM

## 2021-09-03 DIAGNOSIS — R29.6 RECURRENT FALLS: ICD-10-CM

## 2021-09-03 DIAGNOSIS — I87.2 VENOUS STASIS DERMATITIS OF BOTH LOWER EXTREMITIES: ICD-10-CM

## 2021-09-03 DIAGNOSIS — E66.01 MORBID OBESITY WITH BMI OF 50.0-59.9, ADULT (HCC): ICD-10-CM

## 2021-09-03 DIAGNOSIS — I25.118 CORONARY ARTERY DISEASE OF NATIVE ARTERY OF NATIVE HEART WITH STABLE ANGINA PECTORIS (HCC): ICD-10-CM

## 2021-09-03 DIAGNOSIS — G89.29 CHRONIC MIDLINE LOW BACK PAIN WITH LEFT-SIDED SCIATICA: ICD-10-CM

## 2021-09-03 DIAGNOSIS — Z74.09 MOBILITY IMPAIRED: ICD-10-CM

## 2021-09-03 DIAGNOSIS — M96.1 POSTLAMINECTOMY SYNDROME OF LUMBAR REGION: ICD-10-CM

## 2021-09-03 DIAGNOSIS — N50.89 EDEMA OF SCROTUM: ICD-10-CM

## 2021-09-03 DIAGNOSIS — M48.061 SPINAL STENOSIS OF LUMBAR REGION WITHOUT NEUROGENIC CLAUDICATION: ICD-10-CM

## 2021-09-03 PROCEDURE — 99215 OFFICE O/P EST HI 40 MIN: CPT | Performed by: FAMILY MEDICINE

## 2021-09-03 PROCEDURE — G8926 SPIRO NO PERF OR DOC: HCPCS | Performed by: FAMILY MEDICINE

## 2021-09-03 PROCEDURE — 1036F TOBACCO NON-USER: CPT | Performed by: FAMILY MEDICINE

## 2021-09-03 PROCEDURE — 3023F SPIROM DOC REV: CPT | Performed by: FAMILY MEDICINE

## 2021-09-03 PROCEDURE — 3017F COLORECTAL CA SCREEN DOC REV: CPT | Performed by: FAMILY MEDICINE

## 2021-09-03 PROCEDURE — G8427 DOCREV CUR MEDS BY ELIG CLIN: HCPCS | Performed by: FAMILY MEDICINE

## 2021-09-03 PROCEDURE — G8417 CALC BMI ABV UP PARAM F/U: HCPCS | Performed by: FAMILY MEDICINE

## 2021-09-03 PROCEDURE — 93976 VASCULAR STUDY: CPT

## 2021-09-03 RX ORDER — PREDNISONE 10 MG/1
TABLET ORAL
Qty: 30 TABLET | Refills: 0 | Status: ON HOLD
Start: 2021-09-03 | End: 2021-09-14 | Stop reason: HOSPADM

## 2021-09-03 ASSESSMENT — ENCOUNTER SYMPTOMS
SHORTNESS OF BREATH: 1
BACK PAIN: 1
CHEST TIGHTNESS: 1
WHEEZING: 0
ABDOMINAL PAIN: 0

## 2021-09-03 NOTE — PROGRESS NOTES
Visit Information    Have you changed or started any medications since your last visit including any over-the-counter medicines, vitamins, or herbal medicines? no   Are you having any side effects from any of your medications? -  no  Have you stopped taking any of your medications? Is so, why? -  no    Have you seen any other physician or provider since your last visit? Yes - Records Obtained  Have you had any other diagnostic tests since your last visit? Yes - Records Obtained  Have you been seen in the emergency room and/or had an admission to a hospital since we last saw you? Yes - Records Obtained  Have you had your routine dental cleaning in the past 6 months? no    Have you activated your [employer] account? If not, what are your barriers?  Yes     Patient Care Team:  Anders Waddell MD as PCP - General (Family Medicine)  Anders Waddell MD as PCP - [facility]  David Simmons MD as Consulting Physician (Endocrinology)  Gabriele John DO as Consulting Physician (Cardiology)  Kendall Scott DPM as Surgeon (Podiatry)  Fermín Conner MD as Consulting Physician (Ophthalmology)  Jonatan Leo MD as Consulting Physician (Nephrology)  Ria Dakins, MD as Consulting Physician (Pulmonology)  Topher Sinclair MD as Surgeon (Vascular Surgery)  Sung Alvarenga MD as Consulting Physician (Gastroenterology)  Chanel Colby [facility] as Pharmacist (Pharmacist)  Milagros Hui MD as Consulting Physician (Pulmonology)  Shari Yeung MD as Consulting Physician (Urology)  Mathew Santoyo MD as Surgeon (Ophthalmology)  Star Currie MD as Consulting Physician (Neurology)  Ria Dakins, DO as Consulting Physician (Otolaryngology)  Sung Alvarenga MD as Consulting Physician (Gastroenterology)    Medical History Review  Past Medical, Family, and Social History reviewed and does contribute to the patient presenting condition    Health Maintenance   Topic Date Due    Shingles Vaccine (1 of 2) Never done    Diabetic retinal exam  10/24/2019    Diabetic foot exam  05/02/2020    Flu vaccine (1) 09/01/2021    A1C test (Diabetic or Prediabetic)  10/20/2021    Annual Wellness Visit (AWV)  02/24/2022    Lipid screen  07/24/2022    Potassium monitoring  08/30/2022    Creatinine monitoring  08/30/2022    Colon cancer screen colonoscopy  04/12/2024    DTaP/Tdap/Td vaccine (3 - Td or Tdap) 09/12/2028    Pneumococcal 0-64 years Vaccine (2 of 2 - PPSV23) 12/30/2029    Hepatitis B vaccine  Completed    COVID-19 Vaccine  Completed    Hepatitis C screen  Completed    HIV screen  Completed    Hepatitis A vaccine  Aged Out    Hib vaccine  Aged Out    Meningococcal (ACWY) vaccine  Aged Out

## 2021-09-03 NOTE — PATIENT INSTRUCTIONS
Patient Education        Gout: Care Instructions  Overview     Gout is a form of arthritis caused by a buildup of uric acid crystals in a joint. It causes sudden attacks of pain, swelling, redness, and stiffness, usually in one joint, especially the big toe. Gout usually comes on without a cause. But it can be brought on by drinking alcohol (especially beer), eating or drinking things made with high-fructose corn syrup, or eating seafood or red meat. Taking certain medicines, such as diuretics, can also trigger an attack of gout. Taking your medicines as prescribed and following up with your doctor regularly can help you avoid gout attacks in the future. Follow-up care is a key part of your treatment and safety. Be sure to make and go to all appointments, and call your doctor if you are having problems. It's also a good idea to know your test results and keep a list of the medicines you take. How can you care for yourself at home? · If the joint is swollen, put ice or a cold pack on the area for 10 to 20 minutes at a time. Put a thin cloth between the ice and your skin. · Prop up the sore limb on a pillow when you ice it or anytime you sit or lie down during the next 3 days. Try to keep it above the level of your heart. This can help reduce swelling. · Rest sore joints. Avoid activities that put weight or strain on the joints for a few days. Take short rest breaks from your regular activities during the day. · Take your medicines exactly as prescribed. Call your doctor if you think you are having a problem with your medicine. · Take pain medicines exactly as directed. ? If the doctor gave you a prescription medicine for pain, take it as prescribed. ? If you are not taking a prescription pain medicine, ask your doctor if you can take an over-the-counter medicine. · Eat less seafood and red meat. · Avoid foods or drinks that are made with high-fructose corn syrup.   · Check with your doctor before drinking alcohol. · Losing weight, if you are overweight, may help reduce attacks of gout. But do not go on a diet that causes rapid weight loss. Losing a lot of weight in a short amount of time can cause a gout attack. When should you call for help? Call your doctor now or seek immediate medical care if:    · You have a fever.     · The joint is so painful you cannot use it.     · You have sudden, unexplained swelling, redness, warmth, or severe pain in one or more joints. Watch closely for changes in your health, and be sure to contact your doctor if:    · You have joint pain.     · Your symptoms get worse or are not improving after 2 or 3 days. Where can you learn more? Go to https://[employer]. org and sign in to your Wimba account. Enter X887 in the Vaultize box to learn more about \"Gout: Care Instructions. \"     If you do not have an account, please click on the \"Sign Up Now\" link. Current as of: August 5, 2020               Content Version: 12.9  © 2006-2021 [facility]. Care instructions adapted under license by [facility] ([facility]). If you have questions about a medical condition or this instruction, always ask your healthcare professional. [address] any warranty or liability for your use of this information. Patient Education        Purine-Restricted Diet: Care Instructions  Your Care Instructions     Purines are substances that are found in some foods. Your body turns purines into uric acid. High levels of uric acid can cause gout, which is a form of arthritis that causes pain and inflammation in joints. You may be able to help control the amount of uric acid in your body by limiting high-purine foods in your diet. Follow-up care is a key part of your treatment and safety. Be sure to make and go to all appointments, and call your doctor if you are having problems.  It's also a good idea to know your test results and keep a list of the medicines you take. How can you care for yourself at home? · Plan your meals and snacks around foods that are low in purines and are safe for you to eat. These foods include:  ? Green vegetables and tomatoes. ? Fruits. ? Whole-grain breads, rice, and cereals. ? Eggs, peanut butter, and nuts. ? Low-fat milk, cheese, and other milk products. ? Popcorn. ? Gelatin desserts, chocolate, cocoa, and cakes and sweets, in small amounts. · You can eat certain foods that are medium-high in purines, but eat them only once in a while. These foods include:  ? Legumes, such as dried beans and dried peas. You can have 1 cup cooked legumes each day. ? Asparagus, cauliflower, spinach, mushrooms, and green peas. ? Fish and seafood (other than very high-purine seafood). ? Oatmeal, wheat bran, and wheat germ. · Limit very high-purine foods, including:  ? Organ meats, such as liver, kidneys, sweetbreads, and brains. ? Meats, including villegas, beef, pork, and lamb. ? Game meats and any other meats in large amounts. ? Anchovies, sardines, herring, mackerel, and scallops. ? Gravy. ? Beer. Where can you learn more? Go to https://[employer]pepiceweb.[employer]. org and sign in to your CiraNova account. Enter F448 in the [facility] box to learn more about \"Purine-Restricted Diet: Care Instructions. \"     If you do not have an account, please click on the \"Sign Up Now\" link. Current as of: December 17, 2020               Content Version: 12.9  © 2006-2021 [employer]wise, [employer]. Care instructions adapted under license by [facility] [address] If you have questions about a medical condition or this instruction, always ask your healthcare professional. Gregg Rangel any warranty or liability for your use of this information.

## 2021-09-08 ENCOUNTER — TELEPHONE (OUTPATIENT)
Dept: PHARMACY | Age: 56
End: 2021-09-08

## 2021-09-08 NOTE — TELEPHONE ENCOUNTER
Reached out to patient to check in after patient missing appt for diabetes management on 8/25. Patient indicates groin swelling continues to be the same to worse. Did have ultrasound and has follow up with urology on 9/21. Patient also had fall with significant ankle pain. When presented to ED he was diagnosed with gout with elevated uric acid. Patient started on allopurinol and prednisone taper of 40mg x 3 days, 30mg x 3 days, 20mg x 3 days then 10mg x 3 days then stop. Patient just starting day number one of 20mg taper. Patient reports blood sugars have been elevated but also low indicating some up to 500 but also some down below 70. Patient very frustrated with his health right now but does agree to come in for appt in a couple weeks. Indicates his daughter had knee replacement surgery and is coming home today. She is the one that helps patient with most things and will need help herself in coming days. Also indicates he asked for a wheeled scooter but was given order for power wheelchair which he indicates he does not need. Appt scheduled for 9/22. Yesenia Rizvi [facility],Pharm. D,, BCPS, CACP  9/8/2021  5:12 PM

## 2021-09-09 DIAGNOSIS — M48.061 SPINAL STENOSIS OF LUMBAR REGION WITHOUT NEUROGENIC CLAUDICATION: ICD-10-CM

## 2021-09-09 DIAGNOSIS — M54.42 CHRONIC MIDLINE LOW BACK PAIN WITH LEFT-SIDED SCIATICA: ICD-10-CM

## 2021-09-09 DIAGNOSIS — M96.1 POSTLAMINECTOMY SYNDROME OF LUMBAR REGION: ICD-10-CM

## 2021-09-09 DIAGNOSIS — M51.36 DDD (DEGENERATIVE DISC DISEASE), LUMBAR: ICD-10-CM

## 2021-09-09 DIAGNOSIS — G89.29 CHRONIC MIDLINE LOW BACK PAIN WITH LEFT-SIDED SCIATICA: ICD-10-CM

## 2021-09-09 RX ORDER — OXYCODONE HYDROCHLORIDE 10 MG/1
10 TABLET ORAL EVERY 8 HOURS PRN
Qty: 90 TABLET | Refills: 0 | Status: SHIPPED | OUTPATIENT
Start: 2021-09-09 | End: 2021-10-06 | Stop reason: SDUPTHER

## 2021-09-09 NOTE — TELEPHONE ENCOUNTER
Please Approve or Refuse.   Send to Pharmacy per Pt's Request:      Next Visit Date:  10/21/2021   Last Visit Date: 9/3/2021    Hemoglobin A1C (%)   Date Value   07/20/2021 7.6   04/23/2021 8.0   01/20/2021 8.7 (H)             ( goal A1C is < 7)   BP Readings from Last 3 Encounters:   09/03/21 138/84   08/30/21 (!) 112/44   08/17/21 113/64          (goal 120/80)  BUN   Date Value Ref Range Status   08/30/2021 47 (H) 6 - 20 mg/dL Final     CREATININE   Date Value Ref Range Status   08/30/2021 1.90 (H) 0.70 - 1.20 mg/dL Final     Potassium   Date Value Ref Range Status   08/30/2021 4.9 3.7 - 5.3 mmol/L Final

## 2021-09-11 ENCOUNTER — HOSPITAL ENCOUNTER (INPATIENT)
Age: 56
LOS: 3 days | Discharge: HOME | DRG: 286 | End: 2021-09-14
Attending: EMERGENCY MEDICINE
Payer: COMMERCIAL

## 2021-09-11 ENCOUNTER — APPOINTMENT (OUTPATIENT)
Dept: CT IMAGING | Age: 56
DRG: 286 | End: 2021-09-11
Payer: COMMERCIAL

## 2021-09-11 ENCOUNTER — APPOINTMENT (OUTPATIENT)
Dept: GENERAL RADIOLOGY | Age: 56
DRG: 286 | End: 2021-09-11
Payer: COMMERCIAL

## 2021-09-11 DIAGNOSIS — I25.9 CHEST PAIN DUE TO MYOCARDIAL ISCHEMIA, UNSPECIFIED ISCHEMIC CHEST PAIN TYPE: Primary | ICD-10-CM

## 2021-09-11 DIAGNOSIS — N17.9 AKI (ACUTE KIDNEY INJURY) (HCC): ICD-10-CM

## 2021-09-11 DIAGNOSIS — I50.9 ACUTE ON CHRONIC CONGESTIVE HEART FAILURE, UNSPECIFIED HEART FAILURE TYPE (HCC): ICD-10-CM

## 2021-09-11 DIAGNOSIS — R77.8 ELEVATED TROPONIN: ICD-10-CM

## 2021-09-11 DIAGNOSIS — I44.7 LBBB (LEFT BUNDLE BRANCH BLOCK): ICD-10-CM

## 2021-09-11 PROBLEM — R07.9 CHEST PAIN: Status: ACTIVE | Noted: 2021-09-11

## 2021-09-11 PROBLEM — I20.0 UNSTABLE ANGINA (HCC): Status: ACTIVE | Noted: 2021-09-11

## 2021-09-11 LAB
ABSOLUTE EOS #: 0.33 K/UL (ref 0–0.44)
ABSOLUTE IMMATURE GRANULOCYTE: 0.21 K/UL (ref 0–0.3)
ABSOLUTE LYMPH #: 1.2 K/UL (ref 1.1–3.7)
ABSOLUTE MONO #: 0.98 K/UL (ref 0.1–1.2)
ANION GAP SERPL CALCULATED.3IONS-SCNC: 15 MMOL/L (ref 9–17)
BASOPHILS # BLD: 1 % (ref 0–2)
BASOPHILS ABSOLUTE: 0.12 K/UL (ref 0–0.2)
BNP INTERPRETATION: NORMAL
BUN BLDV-MCNC: 68 MG/DL (ref 6–20)
BUN/CREAT BLD: ABNORMAL (ref 9–20)
CALCIUM SERPL-MCNC: 9.5 MG/DL (ref 8.6–10.4)
CHLORIDE BLD-SCNC: 97 MMOL/L (ref 98–107)
CO2: 25 MMOL/L (ref 20–31)
CREAT SERPL-MCNC: 2.23 MG/DL (ref 0.7–1.2)
DIFFERENTIAL TYPE: ABNORMAL
EKG ATRIAL RATE: 75 BPM
EKG P AXIS: 78 DEGREES
EKG P-R INTERVAL: 154 MS
EKG Q-T INTERVAL: 442 MS
EKG QRS DURATION: 170 MS
EKG QTC CALCULATION (BAZETT): 493 MS
EKG R AXIS: 5 DEGREES
EKG T AXIS: 103 DEGREES
EKG VENTRICULAR RATE: 75 BPM
EOSINOPHILS RELATIVE PERCENT: 2 % (ref 1–4)
GFR AFRICAN AMERICAN: 37 ML/MIN
GFR NON-AFRICAN AMERICAN: 31 ML/MIN
GFR SERPL CREATININE-BSD FRML MDRD: ABNORMAL ML/MIN/{1.73_M2}
GFR SERPL CREATININE-BSD FRML MDRD: ABNORMAL ML/MIN/{1.73_M2}
GLUCOSE BLD-MCNC: 110 MG/DL (ref 75–110)
GLUCOSE BLD-MCNC: 116 MG/DL (ref 75–110)
GLUCOSE BLD-MCNC: 157 MG/DL (ref 70–99)
HCT VFR BLD CALC: 34.7 % (ref 40.7–50.3)
HCT VFR BLD CALC: 35.6 % (ref 40.7–50.3)
HEMOGLOBIN: 10.5 G/DL (ref 13–17)
HEMOGLOBIN: 10.6 G/DL (ref 13–17)
IMMATURE GRANULOCYTES: 1 %
LYMPHOCYTES # BLD: 7 % (ref 24–43)
MCH RBC QN AUTO: 26.3 PG (ref 25.2–33.5)
MCH RBC QN AUTO: 26.6 PG (ref 25.2–33.5)
MCHC RBC AUTO-ENTMCNC: 29.8 G/DL (ref 28.4–34.8)
MCHC RBC AUTO-ENTMCNC: 30.3 G/DL (ref 28.4–34.8)
MCV RBC AUTO: 87 FL (ref 82.6–102.9)
MCV RBC AUTO: 89.2 FL (ref 82.6–102.9)
MONOCYTES # BLD: 6 % (ref 3–12)
NRBC AUTOMATED: 0 PER 100 WBC
NRBC AUTOMATED: 0 PER 100 WBC
PARTIAL THROMBOPLASTIN TIME: 23 SEC (ref 20.5–30.5)
PARTIAL THROMBOPLASTIN TIME: 80.9 SEC (ref 20.5–30.5)
PDW BLD-RTO: 15.3 % (ref 11.8–14.4)
PDW BLD-RTO: 15.3 % (ref 11.8–14.4)
PLATELET # BLD: 266 K/UL (ref 138–453)
PLATELET # BLD: 322 K/UL (ref 138–453)
PLATELET ESTIMATE: ABNORMAL
PMV BLD AUTO: 10.3 FL (ref 8.1–13.5)
PMV BLD AUTO: 10.7 FL (ref 8.1–13.5)
POTASSIUM SERPL-SCNC: 4.5 MMOL/L (ref 3.7–5.3)
PRO-BNP: 150 PG/ML
RBC # BLD: 3.99 M/UL (ref 4.21–5.77)
RBC # BLD: 3.99 M/UL (ref 4.21–5.77)
RBC # BLD: ABNORMAL 10*6/UL
SEG NEUTROPHILS: 83 % (ref 36–65)
SEGMENTED NEUTROPHILS ABSOLUTE COUNT: 13.41 K/UL (ref 1.5–8.1)
SODIUM BLD-SCNC: 137 MMOL/L (ref 135–144)
TROPONIN INTERP: ABNORMAL
TROPONIN T: ABNORMAL NG/ML
TROPONIN, HIGH SENSITIVITY: 39 NG/L (ref 0–22)
TROPONIN, HIGH SENSITIVITY: 40 NG/L (ref 0–22)
TROPONIN, HIGH SENSITIVITY: 43 NG/L (ref 0–22)
TROPONIN, HIGH SENSITIVITY: 45 NG/L (ref 0–22)
TROPONIN, HIGH SENSITIVITY: 48 NG/L (ref 0–22)
WBC # BLD: 12.7 K/UL (ref 3.5–11.3)
WBC # BLD: 16.3 K/UL (ref 3.5–11.3)
WBC # BLD: ABNORMAL 10*3/UL

## 2021-09-11 PROCEDURE — 71250 CT THORAX DX C-: CPT

## 2021-09-11 PROCEDURE — 6360000002 HC RX W HCPCS: Performed by: STUDENT IN AN ORGANIZED HEALTH CARE EDUCATION/TRAINING PROGRAM

## 2021-09-11 PROCEDURE — 96374 THER/PROPH/DIAG INJ IV PUSH: CPT

## 2021-09-11 PROCEDURE — 2580000003 HC RX 258: Performed by: STUDENT IN AN ORGANIZED HEALTH CARE EDUCATION/TRAINING PROGRAM

## 2021-09-11 PROCEDURE — 71045 X-RAY EXAM CHEST 1 VIEW: CPT

## 2021-09-11 PROCEDURE — 36415 COLL VENOUS BLD VENIPUNCTURE: CPT

## 2021-09-11 PROCEDURE — 6370000000 HC RX 637 (ALT 250 FOR IP): Performed by: CLINICAL NURSE SPECIALIST

## 2021-09-11 PROCEDURE — 80048 BASIC METABOLIC PNL TOTAL CA: CPT

## 2021-09-11 PROCEDURE — 96376 TX/PRO/DX INJ SAME DRUG ADON: CPT

## 2021-09-11 PROCEDURE — 84484 ASSAY OF TROPONIN QUANT: CPT

## 2021-09-11 PROCEDURE — 2500000003 HC RX 250 WO HCPCS: Performed by: STUDENT IN AN ORGANIZED HEALTH CARE EDUCATION/TRAINING PROGRAM

## 2021-09-11 PROCEDURE — 85027 COMPLETE CBC AUTOMATED: CPT

## 2021-09-11 PROCEDURE — 82947 ASSAY GLUCOSE BLOOD QUANT: CPT

## 2021-09-11 PROCEDURE — 94640 AIRWAY INHALATION TREATMENT: CPT

## 2021-09-11 PROCEDURE — 6370000000 HC RX 637 (ALT 250 FOR IP): Performed by: NURSE PRACTITIONER

## 2021-09-11 PROCEDURE — 93005 ELECTROCARDIOGRAM TRACING: CPT | Performed by: PEDIATRICS

## 2021-09-11 PROCEDURE — 6360000002 HC RX W HCPCS: Performed by: CLINICAL NURSE SPECIALIST

## 2021-09-11 PROCEDURE — 83880 ASSAY OF NATRIURETIC PEPTIDE: CPT

## 2021-09-11 PROCEDURE — 85025 COMPLETE CBC W/AUTO DIFF WBC: CPT

## 2021-09-11 PROCEDURE — 87635 SARS-COV-2 COVID-19 AMP PRB: CPT

## 2021-09-11 PROCEDURE — 99285 EMERGENCY DEPT VISIT HI MDM: CPT

## 2021-09-11 PROCEDURE — 1200000000 HC SEMI PRIVATE

## 2021-09-11 PROCEDURE — 85730 THROMBOPLASTIN TIME PARTIAL: CPT

## 2021-09-11 PROCEDURE — 99222 1ST HOSP IP/OBS MODERATE 55: CPT | Performed by: CLINICAL NURSE SPECIALIST

## 2021-09-11 PROCEDURE — 6360000002 HC RX W HCPCS: Performed by: PEDIATRICS

## 2021-09-11 RX ORDER — LISINOPRIL 10 MG/1
5 TABLET ORAL DAILY
Status: DISCONTINUED | OUTPATIENT
Start: 2021-09-11 | End: 2021-09-14 | Stop reason: HOSPADM

## 2021-09-11 RX ORDER — METOPROLOL TARTRATE 50 MG/1
25 TABLET, FILM COATED ORAL 2 TIMES DAILY
Status: DISCONTINUED | OUTPATIENT
Start: 2021-09-11 | End: 2021-09-11

## 2021-09-11 RX ORDER — AMITRIPTYLINE HYDROCHLORIDE 50 MG/1
100 TABLET, FILM COATED ORAL NIGHTLY
Status: DISCONTINUED | OUTPATIENT
Start: 2021-09-11 | End: 2021-09-14 | Stop reason: HOSPADM

## 2021-09-11 RX ORDER — BUMETANIDE 1 MG/1
3 TABLET ORAL DAILY
Status: DISCONTINUED | OUTPATIENT
Start: 2021-09-12 | End: 2021-09-14 | Stop reason: HOSPADM

## 2021-09-11 RX ORDER — DOCUSATE SODIUM 100 MG/1
100 CAPSULE, LIQUID FILLED ORAL 2 TIMES DAILY
Status: DISCONTINUED | OUTPATIENT
Start: 2021-09-11 | End: 2021-09-14 | Stop reason: HOSPADM

## 2021-09-11 RX ORDER — LATANOPROST 50 UG/ML
1 SOLUTION/ DROPS OPHTHALMIC NIGHTLY
Status: DISCONTINUED | OUTPATIENT
Start: 2021-09-11 | End: 2021-09-14 | Stop reason: HOSPADM

## 2021-09-11 RX ORDER — ESCITALOPRAM OXALATE 10 MG/1
10 TABLET ORAL DAILY
Status: DISCONTINUED | OUTPATIENT
Start: 2021-09-11 | End: 2021-09-14 | Stop reason: HOSPADM

## 2021-09-11 RX ORDER — ASPIRIN 325 MG
325 TABLET ORAL DAILY
Status: DISCONTINUED | OUTPATIENT
Start: 2021-09-12 | End: 2021-09-11

## 2021-09-11 RX ORDER — BUMETANIDE 1 MG/1
3 TABLET ORAL 2 TIMES DAILY
Status: DISCONTINUED | OUTPATIENT
Start: 2021-09-11 | End: 2021-09-11

## 2021-09-11 RX ORDER — FLUTICASONE PROPIONATE 50 MCG
2 SPRAY, SUSPENSION (ML) NASAL DAILY PRN
Status: DISCONTINUED | OUTPATIENT
Start: 2021-09-11 | End: 2021-09-14 | Stop reason: HOSPADM

## 2021-09-11 RX ORDER — VITAMIN B COMPLEX
1000 TABLET ORAL DAILY
Status: DISCONTINUED | OUTPATIENT
Start: 2021-09-11 | End: 2021-09-14 | Stop reason: HOSPADM

## 2021-09-11 RX ORDER — CHOLECALCIFEROL (VITAMIN D3) 125 MCG
500 CAPSULE ORAL DAILY
Status: DISCONTINUED | OUTPATIENT
Start: 2021-09-11 | End: 2021-09-14 | Stop reason: HOSPADM

## 2021-09-11 RX ORDER — ONDANSETRON 4 MG/1
4 TABLET, ORALLY DISINTEGRATING ORAL EVERY 8 HOURS PRN
Status: DISCONTINUED | OUTPATIENT
Start: 2021-09-11 | End: 2021-09-14 | Stop reason: HOSPADM

## 2021-09-11 RX ORDER — TIZANIDINE 4 MG/1
4 TABLET ORAL EVERY 8 HOURS PRN
Status: DISCONTINUED | OUTPATIENT
Start: 2021-09-11 | End: 2021-09-14 | Stop reason: HOSPADM

## 2021-09-11 RX ORDER — POTASSIUM CHLORIDE 7.45 MG/ML
10 INJECTION INTRAVENOUS PRN
Status: DISCONTINUED | OUTPATIENT
Start: 2021-09-11 | End: 2021-09-14 | Stop reason: HOSPADM

## 2021-09-11 RX ORDER — ONDANSETRON 2 MG/ML
4 INJECTION INTRAMUSCULAR; INTRAVENOUS EVERY 6 HOURS PRN
Status: DISCONTINUED | OUTPATIENT
Start: 2021-09-11 | End: 2021-09-14 | Stop reason: HOSPADM

## 2021-09-11 RX ORDER — LANOLIN ALCOHOL/MO/W.PET/CERES
325 CREAM (GRAM) TOPICAL DAILY
Status: DISCONTINUED | OUTPATIENT
Start: 2021-09-11 | End: 2021-09-14 | Stop reason: HOSPADM

## 2021-09-11 RX ORDER — MORPHINE SULFATE 4 MG/ML
4 INJECTION, SOLUTION INTRAMUSCULAR; INTRAVENOUS ONCE
Status: COMPLETED | OUTPATIENT
Start: 2021-09-11 | End: 2021-09-11

## 2021-09-11 RX ORDER — HEPARIN SODIUM 1000 [USP'U]/ML
4000 INJECTION, SOLUTION INTRAVENOUS; SUBCUTANEOUS PRN
Status: DISCONTINUED | OUTPATIENT
Start: 2021-09-11 | End: 2021-09-14

## 2021-09-11 RX ORDER — HEPARIN SODIUM 1000 [USP'U]/ML
2000 INJECTION, SOLUTION INTRAVENOUS; SUBCUTANEOUS PRN
Status: DISCONTINUED | OUTPATIENT
Start: 2021-09-11 | End: 2021-09-14

## 2021-09-11 RX ORDER — METOLAZONE 2.5 MG/1
2.5 TABLET ORAL
Status: DISCONTINUED | OUTPATIENT
Start: 2021-09-13 | End: 2021-09-14

## 2021-09-11 RX ORDER — ATORVASTATIN CALCIUM 80 MG/1
80 TABLET, FILM COATED ORAL NIGHTLY
Status: DISCONTINUED | OUTPATIENT
Start: 2021-09-11 | End: 2021-09-11 | Stop reason: SDUPTHER

## 2021-09-11 RX ORDER — BUTALBITAL, ACETAMINOPHEN AND CAFFEINE 50; 325; 40 MG/1; MG/1; MG/1
1 TABLET ORAL EVERY 8 HOURS PRN
Status: DISCONTINUED | OUTPATIENT
Start: 2021-09-11 | End: 2021-09-14 | Stop reason: HOSPADM

## 2021-09-11 RX ORDER — LANOLIN ALCOHOL/MO/W.PET/CERES
10 CREAM (GRAM) TOPICAL NIGHTLY PRN
Status: DISCONTINUED | OUTPATIENT
Start: 2021-09-11 | End: 2021-09-14 | Stop reason: HOSPADM

## 2021-09-11 RX ORDER — ASPIRIN 81 MG/1
81 TABLET ORAL DAILY
Status: DISCONTINUED | OUTPATIENT
Start: 2021-09-12 | End: 2021-09-14 | Stop reason: HOSPADM

## 2021-09-11 RX ORDER — MORPHINE SULFATE 4 MG/ML
2 INJECTION, SOLUTION INTRAMUSCULAR; INTRAVENOUS
Status: DISCONTINUED | OUTPATIENT
Start: 2021-09-11 | End: 2021-09-14 | Stop reason: HOSPADM

## 2021-09-11 RX ORDER — SODIUM CHLORIDE 0.9 % (FLUSH) 0.9 %
10 SYRINGE (ML) INJECTION PRN
Status: DISCONTINUED | OUTPATIENT
Start: 2021-09-11 | End: 2021-09-14 | Stop reason: HOSPADM

## 2021-09-11 RX ORDER — NITROGLYCERIN 0.4 MG/1
0.4 TABLET SUBLINGUAL EVERY 5 MIN PRN
Status: CANCELLED | OUTPATIENT
Start: 2021-09-11

## 2021-09-11 RX ORDER — HEPARIN SODIUM 1000 [USP'U]/ML
4000 INJECTION, SOLUTION INTRAVENOUS; SUBCUTANEOUS ONCE
Status: COMPLETED | OUTPATIENT
Start: 2021-09-11 | End: 2021-09-11

## 2021-09-11 RX ORDER — ROSUVASTATIN CALCIUM 10 MG/1
10 TABLET, COATED ORAL NIGHTLY
Status: DISCONTINUED | OUTPATIENT
Start: 2021-09-11 | End: 2021-09-14 | Stop reason: HOSPADM

## 2021-09-11 RX ORDER — MAGNESIUM SULFATE 1 G/100ML
1000 INJECTION INTRAVENOUS PRN
Status: DISCONTINUED | OUTPATIENT
Start: 2021-09-11 | End: 2021-09-14 | Stop reason: HOSPADM

## 2021-09-11 RX ORDER — MORPHINE SULFATE 4 MG/ML
2 INJECTION, SOLUTION INTRAMUSCULAR; INTRAVENOUS ONCE
Status: COMPLETED | OUTPATIENT
Start: 2021-09-11 | End: 2021-09-11

## 2021-09-11 RX ORDER — POTASSIUM CHLORIDE 20 MEQ/1
40 TABLET, EXTENDED RELEASE ORAL PRN
Status: DISCONTINUED | OUTPATIENT
Start: 2021-09-11 | End: 2021-09-14 | Stop reason: HOSPADM

## 2021-09-11 RX ORDER — PANTOPRAZOLE SODIUM 40 MG/1
40 TABLET, DELAYED RELEASE ORAL
Status: DISCONTINUED | OUTPATIENT
Start: 2021-09-12 | End: 2021-09-14 | Stop reason: HOSPADM

## 2021-09-11 RX ORDER — VENLAFAXINE HYDROCHLORIDE 75 MG/1
75 CAPSULE, EXTENDED RELEASE ORAL DAILY
Status: DISCONTINUED | OUTPATIENT
Start: 2021-09-11 | End: 2021-09-14 | Stop reason: HOSPADM

## 2021-09-11 RX ORDER — BUDESONIDE AND FORMOTEROL FUMARATE DIHYDRATE 160; 4.5 UG/1; UG/1
2 AEROSOL RESPIRATORY (INHALATION) 2 TIMES DAILY
Status: DISCONTINUED | OUTPATIENT
Start: 2021-09-11 | End: 2021-09-14 | Stop reason: HOSPADM

## 2021-09-11 RX ORDER — NITROGLYCERIN 20 MG/100ML
5-200 INJECTION INTRAVENOUS CONTINUOUS
Status: DISCONTINUED | OUTPATIENT
Start: 2021-09-11 | End: 2021-09-14

## 2021-09-11 RX ORDER — 0.9 % SODIUM CHLORIDE 0.9 %
1000 INTRAVENOUS SOLUTION INTRAVENOUS ONCE
Status: COMPLETED | OUTPATIENT
Start: 2021-09-11 | End: 2021-09-11

## 2021-09-11 RX ORDER — METOPROLOL TARTRATE 50 MG/1
50 TABLET, FILM COATED ORAL 2 TIMES DAILY
Status: DISCONTINUED | OUTPATIENT
Start: 2021-09-11 | End: 2021-09-14 | Stop reason: HOSPADM

## 2021-09-11 RX ORDER — ALBUTEROL SULFATE 2.5 MG/3ML
2.5 SOLUTION RESPIRATORY (INHALATION) EVERY 6 HOURS PRN
Status: DISCONTINUED | OUTPATIENT
Start: 2021-09-11 | End: 2021-09-14 | Stop reason: HOSPADM

## 2021-09-11 RX ORDER — ACETAMINOPHEN 650 MG/1
650 SUPPOSITORY RECTAL EVERY 6 HOURS PRN
Status: DISCONTINUED | OUTPATIENT
Start: 2021-09-11 | End: 2021-09-14 | Stop reason: HOSPADM

## 2021-09-11 RX ORDER — SODIUM CHLORIDE 9 MG/ML
25 INJECTION, SOLUTION INTRAVENOUS PRN
Status: DISCONTINUED | OUTPATIENT
Start: 2021-09-11 | End: 2021-09-14 | Stop reason: HOSPADM

## 2021-09-11 RX ORDER — SODIUM CHLORIDE 0.9 % (FLUSH) 0.9 %
5-40 SYRINGE (ML) INJECTION EVERY 12 HOURS SCHEDULED
Status: DISCONTINUED | OUTPATIENT
Start: 2021-09-11 | End: 2021-09-14 | Stop reason: HOSPADM

## 2021-09-11 RX ORDER — ALLOPURINOL 100 MG/1
100 TABLET ORAL DAILY
Status: DISCONTINUED | OUTPATIENT
Start: 2021-09-11 | End: 2021-09-14 | Stop reason: HOSPADM

## 2021-09-11 RX ORDER — HEPARIN SODIUM 10000 [USP'U]/100ML
5 INJECTION, SOLUTION INTRAVENOUS CONTINUOUS
Status: DISCONTINUED | OUTPATIENT
Start: 2021-09-11 | End: 2021-09-14

## 2021-09-11 RX ORDER — SPIRONOLACTONE 25 MG/1
50 TABLET ORAL DAILY
Status: DISCONTINUED | OUTPATIENT
Start: 2021-09-11 | End: 2021-09-14 | Stop reason: HOSPADM

## 2021-09-11 RX ORDER — NALOXONE HYDROCHLORIDE 4 MG/.1ML
1 SPRAY NASAL PRN
Status: DISCONTINUED | OUTPATIENT
Start: 2021-09-11 | End: 2021-09-11 | Stop reason: RX

## 2021-09-11 RX ORDER — CLOPIDOGREL BISULFATE 75 MG/1
75 TABLET ORAL DAILY
Status: DISCONTINUED | OUTPATIENT
Start: 2021-09-11 | End: 2021-09-14 | Stop reason: HOSPADM

## 2021-09-11 RX ORDER — NITROGLYCERIN 0.4 MG/1
0.4 TABLET SUBLINGUAL EVERY 5 MIN PRN
Status: DISCONTINUED | OUTPATIENT
Start: 2021-09-11 | End: 2021-09-14 | Stop reason: HOSPADM

## 2021-09-11 RX ORDER — MORPHINE SULFATE 4 MG/ML
4 INJECTION, SOLUTION INTRAMUSCULAR; INTRAVENOUS
Status: DISCONTINUED | OUTPATIENT
Start: 2021-09-11 | End: 2021-09-14 | Stop reason: HOSPADM

## 2021-09-11 RX ORDER — ACETAMINOPHEN 325 MG/1
650 TABLET ORAL EVERY 6 HOURS PRN
Status: DISCONTINUED | OUTPATIENT
Start: 2021-09-11 | End: 2021-09-14 | Stop reason: HOSPADM

## 2021-09-11 RX ORDER — GABAPENTIN 300 MG/1
300 CAPSULE ORAL 2 TIMES DAILY
Status: DISCONTINUED | OUTPATIENT
Start: 2021-09-11 | End: 2021-09-14 | Stop reason: HOSPADM

## 2021-09-11 RX ADMIN — HEPARIN SODIUM 4000 UNITS: 1000 INJECTION INTRAVENOUS; SUBCUTANEOUS at 11:28

## 2021-09-11 RX ADMIN — MORPHINE SULFATE 2 MG: 4 INJECTION INTRAVENOUS at 15:42

## 2021-09-11 RX ADMIN — MAGNESIUM GLUCONATE 500 MG ORAL TABLET 400 MG: 500 TABLET ORAL at 11:32

## 2021-09-11 RX ADMIN — CLOPIDOGREL 75 MG: 75 TABLET, FILM COATED ORAL at 11:33

## 2021-09-11 RX ADMIN — Medication 500 MCG: at 11:31

## 2021-09-11 RX ADMIN — VENLAFAXINE HYDROCHLORIDE 75 MG: 75 CAPSULE, EXTENDED RELEASE ORAL at 11:32

## 2021-09-11 RX ADMIN — GABAPENTIN 300 MG: 300 CAPSULE ORAL at 11:42

## 2021-09-11 RX ADMIN — Medication 1000 UNITS: at 11:32

## 2021-09-11 RX ADMIN — LISINOPRIL 5 MG: 10 TABLET ORAL at 11:43

## 2021-09-11 RX ADMIN — HEPARIN SODIUM AND DEXTROSE 5 UNITS/KG/HR: 10000; 5 INJECTION INTRAVENOUS at 11:27

## 2021-09-11 RX ADMIN — SPIRONOLACTONE 50 MG: 25 TABLET ORAL at 11:33

## 2021-09-11 RX ADMIN — ROSUVASTATIN CALCIUM 10 MG: 10 TABLET, COATED ORAL at 21:53

## 2021-09-11 RX ADMIN — MORPHINE SULFATE 2 MG: 4 INJECTION INTRAVENOUS at 00:22

## 2021-09-11 RX ADMIN — ACETAMINOPHEN 650 MG: 325 TABLET ORAL at 11:33

## 2021-09-11 RX ADMIN — MORPHINE SULFATE 2 MG: 4 INJECTION INTRAVENOUS at 00:39

## 2021-09-11 RX ADMIN — ESCITALOPRAM OXALATE 10 MG: 10 TABLET ORAL at 11:32

## 2021-09-11 RX ADMIN — ALLOPURINOL 100 MG: 100 TABLET ORAL at 11:32

## 2021-09-11 RX ADMIN — BUDESONIDE AND FORMOTEROL FUMARATE DIHYDRATE 2 PUFF: 160; 4.5 AEROSOL RESPIRATORY (INHALATION) at 20:31

## 2021-09-11 RX ADMIN — GABAPENTIN 300 MG: 300 CAPSULE ORAL at 21:52

## 2021-09-11 RX ADMIN — SODIUM CHLORIDE 1000 ML: 9 INJECTION, SOLUTION INTRAVENOUS at 08:00

## 2021-09-11 RX ADMIN — METOPROLOL TARTRATE 50 MG: 50 TABLET, FILM COATED ORAL at 21:52

## 2021-09-11 RX ADMIN — TIZANIDINE 4 MG: 4 TABLET ORAL at 11:33

## 2021-09-11 RX ADMIN — MORPHINE SULFATE 4 MG: 4 INJECTION INTRAVENOUS at 01:57

## 2021-09-11 RX ADMIN — LATANOPROST 1 DROP: 50 SOLUTION OPHTHALMIC at 21:53

## 2021-09-11 RX ADMIN — DOCUSATE SODIUM 100 MG: 100 CAPSULE, LIQUID FILLED ORAL at 21:53

## 2021-09-11 RX ADMIN — MORPHINE SULFATE 4 MG: 4 INJECTION INTRAVENOUS at 04:08

## 2021-09-11 RX ADMIN — BUMETANIDE 3 MG: 1 TABLET ORAL at 11:32

## 2021-09-11 RX ADMIN — MORPHINE SULFATE 4 MG: 4 INJECTION, SOLUTION INTRAMUSCULAR; INTRAVENOUS at 18:32

## 2021-09-11 RX ADMIN — NITROGLYCERIN 20 MCG/MIN: 20 INJECTION INTRAVENOUS at 11:25

## 2021-09-11 RX ADMIN — DOCUSATE SODIUM 100 MG: 100 CAPSULE, LIQUID FILLED ORAL at 11:42

## 2021-09-11 RX ADMIN — FERROUS SULFATE TAB EC 325 MG (65 MG FE EQUIVALENT) 325 MG: 325 (65 FE) TABLET DELAYED RESPONSE at 11:32

## 2021-09-11 RX ADMIN — AMITRIPTYLINE HYDROCHLORIDE 100 MG: 50 TABLET, FILM COATED ORAL at 21:53

## 2021-09-11 RX ADMIN — METOPROLOL TARTRATE 50 MG: 50 TABLET, FILM COATED ORAL at 11:42

## 2021-09-11 RX ADMIN — MORPHINE SULFATE 4 MG: 4 INJECTION, SOLUTION INTRAMUSCULAR; INTRAVENOUS at 21:59

## 2021-09-11 ASSESSMENT — ENCOUNTER SYMPTOMS
EYE DISCHARGE: 0
RHINORRHEA: 0
COUGH: 0
EYE REDNESS: 0
SORE THROAT: 0
CONSTIPATION: 0
DIARRHEA: 0
ABDOMINAL PAIN: 0
WHEEZING: 0
BACK PAIN: 1
SHORTNESS OF BREATH: 0
TROUBLE SWALLOWING: 0
VOMITING: 0
PHOTOPHOBIA: 0
SHORTNESS OF BREATH: 1
CHOKING: 0
NAUSEA: 1

## 2021-09-11 ASSESSMENT — PAIN SCALES - GENERAL
PAINLEVEL_OUTOF10: 4
PAINLEVEL_OUTOF10: 8
PAINLEVEL_OUTOF10: 10
PAINLEVEL_OUTOF10: 10
PAINLEVEL_OUTOF10: 9
PAINLEVEL_OUTOF10: 8
PAINLEVEL_OUTOF10: 10
PAINLEVEL_OUTOF10: 8
PAINLEVEL_OUTOF10: 7

## 2021-09-11 ASSESSMENT — PAIN DESCRIPTION - LOCATION: LOCATION: CHEST

## 2021-09-11 ASSESSMENT — PAIN DESCRIPTION - DIRECTION: RADIATING_TOWARDS: LEFT SHOULDER

## 2021-09-11 ASSESSMENT — PAIN DESCRIPTION - ORIENTATION: ORIENTATION: ANTERIOR;MID

## 2021-09-11 ASSESSMENT — PAIN DESCRIPTION - PAIN TYPE: TYPE: ACUTE PAIN

## 2021-09-11 ASSESSMENT — HEART SCORE: ECG: 1

## 2021-09-11 ASSESSMENT — PAIN DESCRIPTION - FREQUENCY: FREQUENCY: CONTINUOUS

## 2021-09-11 NOTE — ED PROVIDER NOTES
[facility] HANDOFF       Handoff taken on the following patient from prior Attending Physician:  Pt Name: Severa Meeter. PCP:  Nasim Ballesteros MD    Attestation  I was available and discussed any additional care issues that arose and coordinated the management plans with the resident(s) caring for the patient during my duty period. Any areas of disagreement with resident's documentation of care or procedures are noted on the chart. I was personally present for the key portions of any/all procedures during my duty period. I have documented in the chart those procedures where I was not present during the key portions. CHIEF COMPLAINT       Chief Complaint   Patient presents with    Chest Pain         CURRENT MEDICATIONS     Previous Medications  Previous Medications    ALBUTEROL (PROVENTIL) (2.5 MG/3ML) 0.083% NEBULIZER SOLUTION    Take 3 mLs by nebulization every 6 hours as needed for Wheezing or Shortness of Breath    ALLOPURINOL (ZYLOPRIM) 100 MG TABLET    Take 1 tablet by mouth daily FOR GOUT. STOP IT IF ANY RASH DEVELOPS! AMITRIPTYLINE (ELAVIL) 50 MG TABLET    Take 2 po qhs    AMMONIUM LACTATE (LAC-HYDRIN) 12 % CREAM    Apply topically to dry skin BID. AMMONIUM LACTATE (LAC-HYDRIN) 12 % LOTION    Apply topically daily. For dry skin    ASPIRIN 81 MG EC TABLET    Take 81 mg by mouth daily. BLOOD GLUCOSE MONITOR STRIPS    Test 3 times a day & as needed for symptoms of irregular blood glucose. Dispense sufficient amount for indicated testing frequency plus additional to accommodate PRN testing needs.  One touch Ultra blue    BUMETANIDE (BUMEX) 1 MG TABLET    TAKE THREE TABLETS BY MOUTH TWICE A DAY    BUTALBITAL-ACETAMINOPHEN-CAFFEINE (FIORICET, ESGIC) -40 MG PER TABLET    Take 1 tablet by mouth every 8 hours as needed for Headaches    CLOBETASOL (TEMOVATE) 0.05 % OINTMENT    Apply topically 2 times daily for psoriasis    CLOPIDOGREL (PLAVIX) 75 MG TABLET Take 1 tablet by mouth daily    CONTINUOUS BLOOD GLUC SENSOR (FREESTYLE CRISTINE 14 DAY SENSOR) [facility]    Use one sensor every 14 days    CONTINUOUS BLOOD GLUC SENSOR (FREESTYLE CRISTINE 2 SENSOR) [facility]    Patient to apply a new sensor every 14 days. RX to cover voucher patient will bring in. DOCUSATE SODIUM (COLACE) 100 MG CAPSULE    Take 1 capsule by mouth 2 times daily For constipation    ESCITALOPRAM (LEXAPRO) 10 MG TABLET    Take 1 tablet by mouth daily    FERROUS SULFATE (IRON) 325 (65 FE) MG TABS    Take 1 tablet by mouth daily    FLUTICASONE (CUTIVATE) 0.05 % CREAM    Apply topically 2 times daily     FLUTICASONE (FLONASE) 50 MCG/ACT NASAL SPRAY    2 sprays by Nasal route daily    FLUTICASONE-SALMETEROL (ADVAIR HFA) 230-21 MCG/ACT INHALER    Inhale 2 puffs into the lungs 2 times daily    GABAPENTIN (NEURONTIN) 300 MG CAPSULE    Take 1 po bid    GAUZE BANDAGES (ROLLED GAUZE BANDAGE 4\"X2. 5YD) MISC    For twice a day dressing    GAUZE PADS & DRESSINGS 4\"X4\" PADS    Twice a day dressing of right leg wound    HANDICAP PLACARD MISC    by Does not apply route Can't walk greater than 200 feet. Expires in 5 years. INSULIN REGULAR HUMAN (HUMULIN R) 500 UNIT/ML CONCENTRATED INJECTION VIAL    Patient using 0.52ml with breakfast, 0.52ml with lunch and 0.45ml with dinner. INSULIN SYRINGE-NEEDLE U-100 31G X 5/16\" 1 ML MISC    Use to subcutaneously inject insulin three times daily    KETOCONAZOLE (NIZORAL) 2 % CREAM    Apply twice a day for yeast infection in the skin folds, for 4 weeks    LANCET DEVICES (LANCING DEVICE) MISC    Provide patient with lancing device appropriate for his machine/lancing needles. LANCETS MISC    Use to check blood sugar three times daily along with when necessary due to symptoms. LATANOPROST (XALATAN) 0.005 % OPHTHALMIC SOLUTION    1 drop nightly    LIDOCAINE 4 % LOTN    Apply topically    LISINOPRIL (PRINIVIL;ZESTRIL) 5 MG TABLET    Take 1 tablet by mouth daily . Discontinued Lisinopril  10 mg    MAGNESIUM OXIDE (MAG-OX) 400 (240 MG) MG TABLET    Take 1 tablet by mouth daily    MELATONIN 10 MG TABS    Take 10 mg by mouth nightly as needed (insomnia)    METOLAZONE (ZAROXOLYN) 2.5 MG TABLET    Take 2.5 mg by mouth three times a week MWF    METOPROLOL TARTRATE (LOPRESSOR) 50 MG TABLET    Take 1 tablet by mouth 2 times daily    MUPIROCIN (BACTROBAN) 2 % OINTMENT    Apply topically 2 times daily on the affected area for 7-10 days. OK to substitute to cream    NALOXONE 4 MG/0.1ML LIQD NASAL SPRAY    1 spray by Nasal route as needed for Opioid Reversal Due to COPD and sleep apnea, this is a big recommendation for you    NITROGLYCERIN (NITROSTAT) 0.4 MG SL TABLET    DISSOLVE 1 TAB UNDER TONGUE FOR CHEST PAIN - IF PAIN REMAINS AFTER 5 MIN, CALL 911 AND REPEAT DOSE. MAX 3 TABS IN 15 MINUTES    NYSTATIN (MYCOSTATIN) 311459 UNIT/GM POWDER    Apply 2 times daily in the skin folds for several months    ONE TOUCH ULTRASOFT LANCETS MISC    Patient to test blood sugar up to 4 times daily. OXYCODONE HCL (OXY-IR) 10 MG IMMEDIATE RELEASE TABLET    Take 1 tablet by mouth every 8 hours as needed for Pain for up to 30 days. OXYGEN TUBING MISC    by Does not apply route DX COPD.  chronic respiratory failure    PANTOPRAZOLE (PROTONIX) 40 MG TABLET    Take 1 tablet by mouth every morning (before breakfast)    PREDNISONE (DELTASONE) 10 MG TABLET    Take 4 tabs X 3 days, then 3 tabs X 3 days, then 2 tabs X 3 days, then 1 tab X 3 days    PROAIR  (90 BASE) MCG/ACT INHALER    INHALE TWO PUFFS BY MOUTH EVERY 6 HOURS AS NEEDED FOR WHEEZING OR FOR SHORTNESS OF BREATH    RESPIRATORY THERAPY SUPPLIES (NEBULIZER/TUBING/MOUTHPIECE) KIT    Dx COPD needs nebulizer supplies    ROSUVASTATIN (CRESTOR) 10 MG TABLET    Take 1 tablet by mouth nightly Stop pravastatin    SPACER/AERO CHAMBER MOUTHPIECE MISC    1 each by Does not apply route once as needed (to be used with his inhalers)    SPIRONOLACTONE (ALDACTONE) 50 MG TABLET    Take 1 tablet by mouth daily    SULFACETAMIDE (BLEPH-10) 10 % OPHTHALMIC SOLUTION        TIOTROPIUM (SPIRIVA RESPIMAT) 2.5 MCG/ACT AERS INHALER    Inhale 2 puffs into the lungs daily    TIZANIDINE (ZANAFLEX) 4 MG TABLET    TAKE ONE TABLET BY MOUTH EVERY 8 HOURS AS NEEDED FOR BACK PAIN    VENLAFAXINE (EFFEXOR XR) 75 MG EXTENDED RELEASE CAPSULE    Take 1 capsule by mouth daily Take with food    VITAMIN B-12 (CYANOCOBALAMIN) 500 MCG TABLET    Take 1 tablet by mouth daily    VITAMIN D3 (CHOLECALCIFEROL) 25 MCG (1000 UT) TABS TABLET    Take 1 tablet by mouth daily       Encounter Medications  Orders Placed This Encounter   Medications    morphine injection 2 mg    morphine injection 2 mg    morphine injection 4 mg    DISCONTD: iopamidol (ISOVUE-370) 76 % injection 100 mL    morphine injection 4 mg       ALLERGIES     is allergic to levofloxacin, lorazepam, nsaids, prozac [fluoxetine hcl], and vancomycin. RECENT VITALS:   Temp: 98.4 °F (36.9 °C),  Pulse: 73, Resp: 13, BP: (!) 127/58    RADIOLOGY:   CT CHEST WO CONTRAST   Final Result   The IV was lost and contrast could not be given. Dissection therefore cannot   be excluded. This noncontrast exam is normal for age. Findings discussed with the ordering physician 09/11/2021 at 0321 hours. XR CHEST PORTABLE   Final Result   Right basilar opacity may represent airspace disease or superimposed shadows   on this portable study obtained on an obese patient. When the patient is able, a follow-up PA and lateral examination of the chest   would be helpful.              LABS:  Labs Reviewed   CBC WITH AUTO DIFFERENTIAL - Abnormal; Notable for the following components:       Result Value    WBC 16.3 (*)     RBC 3.99 (*)     Hemoglobin 10.6 (*)     Hematocrit 35.6 (*)     RDW 15.3 (*)     Seg Neutrophils 83 (*)     Lymphocytes 7 (*)     Immature Granulocytes 1 (*)     Segs Absolute 13.41 (*)     All other components within normal limits   BASIC METABOLIC PANEL W/ REFLEX TO MG FOR LOW K - Abnormal; Notable for the following components:    Glucose 157 (*)     BUN 68 (*)     CREATININE 2.23 (*)     Chloride 97 (*)     GFR Non- 31 (*)     GFR  37 (*)     All other components within normal limits   TROPONIN - Abnormal; Notable for the following components:    Troponin, High Sensitivity 45 (*)     All other components within normal limits   TROPONIN - Abnormal; Notable for the following components:    Troponin, High Sensitivity 48 (*)     All other components within normal limits   TROPONIN - Abnormal; Notable for the following components:    Troponin, High Sensitivity 43 (*)     All other components within normal limits   COVID-19, RAPID   BRAIN NATRIURETIC PEPTIDE     Chest pain, left bundle branch block pattern, not seen on old EKG but noted on rhythm strips. Very low suspicion for aortic dissection, unable to do CT chest with contrast due to loss of IV however CT was reviewed with radiology and there is no change in width of aorta/mediastinum, suspicion is low. PLAN/ TASKS OUTSTANDING       Admitted, awaiting bed placement    (Please note that portions of this note were completed with a voice recognition program.  Efforts were made to edit the dictations but occasionally words are mis-transcribed. )    Sylvia Baron MD,, MD, F.A.C.E.P.   Attending Emergency Physician        Sylvia Baron MD  09/11/21 [id number]       Sylvia Baron MD  09/11/21 [id number]

## 2021-09-11 NOTE — ED PROVIDER NOTES
[facility] [facility]  Emergency Department  Emergency Medicine Resident Sign-out     Care of [facility] Kehinde Paige. was assumed from Dr. Onur Quiñones and is being seen for Chest Pain  . The patient's initial evaluation and plan have been discussed with the prior provider who initially evaluated the patient.      EMERGENCY DEPARTMENT COURSE / MEDICAL DECISION MAKING:       MEDICATIONS GIVEN:  Orders Placed This Encounter   Medications    morphine injection 2 mg    morphine injection 2 mg    morphine injection 4 mg    DISCONTD: iopamidol (ISOVUE-370) 76 % injection 100 mL    morphine injection 4 mg       LABS / RADIOLOGY:     Labs Reviewed   CBC WITH AUTO DIFFERENTIAL - Abnormal; Notable for the following components:       Result Value    WBC 16.3 (*)     RBC 3.99 (*)     Hemoglobin 10.6 (*)     Hematocrit 35.6 (*)     RDW 15.3 (*)     Seg Neutrophils 83 (*)     Lymphocytes 7 (*)     Immature Granulocytes 1 (*)     Segs Absolute 13.41 (*)     All other components within normal limits   BASIC METABOLIC PANEL W/ REFLEX TO MG FOR LOW K - Abnormal; Notable for the following components:    Glucose 157 (*)     BUN 68 (*)     CREATININE 2.23 (*)     Chloride 97 (*)     GFR Non- 31 (*)     GFR  37 (*)     All other components within normal limits   TROPONIN - Abnormal; Notable for the following components:    Troponin, High Sensitivity 45 (*)     All other components within normal limits   TROPONIN - Abnormal; Notable for the following components:    Troponin, High Sensitivity 48 (*)     All other components within normal limits   TROPONIN - Abnormal; Notable for the following components:    Troponin, High Sensitivity 43 (*)     All other components within normal limits   COVID-19, RAPID   BRAIN NATRIURETIC PEPTIDE       XR ANKLE LEFT (MIN 3 VIEWS)    Result Date: 8/30/2021  EXAMINATION: THREE XRAY VIEWS OF THE LEFT ANKLE; THREE XRAY VIEWS OF THE LEFT FOOT 8/30/2021 10:30 am COMPARISON: Left ankle plain radiographs from 12/03/2019 HISTORY: ORDERING SYSTEM PROVIDED HISTORY: pain over lateral TECHNOLOGIST PROVIDED HISTORY: pain over lateral Reason for Exam: Pain over lateral foot and ankle not able to bear weight. No known injury. Acuity: Acute Type of Exam: Initial Additional signs and symptoms: Pain over lateral foot and ankle not able to bear weight. No known injury. 58-year-old male with pain over lateral foot and ankle with inability to bear weight; no known injury FINDINGS: Left ankle: Ankle mortise is intact. Osseous alignment is normal.  Joint spaces are well maintained. No acute fracture or gross dislocation is seen. Moderate edema and soft tissue swelling about the left lower leg, ankle and foot. Mild distal Achilles enthesopathy. Remote well corticated avulsion fracture along the inferior aspect of the medial malleolus. No tibiotalar joint effusion is identified. Boehler's angle is maintained. Left foot: Osseous alignment is normal.  Mild degenerative changes of the 1st IP joint. No marginal erosions are identified. No acute fracture or gross dislocation is seen. The medial and middle cuneiforms demonstrate proper alignment with the base of the 1st and 2nd metatarsals respectively. No tibiotalar joint effusion is seen. Boehler's angle is maintained. Mild-to-moderate edema in the soft tissues of the left foot and ankle. Left ankle: 1. Moderate edema and soft tissue swelling about the left lower extremity. 2. No acute fracture or dislocation. Left foot: 1. Mild to moderate edema in the soft tissues of the left foot and ankle. 2. No acute fracture or dislocation.      XR FOOT LEFT (MIN 3 VIEWS)    Result Date: 8/30/2021  EXAMINATION: THREE XRAY VIEWS OF THE LEFT ANKLE; THREE XRAY VIEWS OF THE LEFT FOOT 8/30/2021 10:30 am COMPARISON: Left ankle plain radiographs from 12/03/2019 HISTORY: ORDERING SYSTEM PROVIDED HISTORY: pain over lateral TECHNOLOGIST PROVIDED HISTORY: pain over lateral Reason for Exam: Pain over lateral foot and ankle not able to bear weight. No known injury. Acuity: Acute Type of Exam: Initial Additional signs and symptoms: Pain over lateral foot and ankle not able to bear weight. No known injury. 77-year-old male with pain over lateral foot and ankle with inability to bear weight; no known injury FINDINGS: Left ankle: Ankle mortise is intact. Osseous alignment is normal.  Joint spaces are well maintained. No acute fracture or gross dislocation is seen. Moderate edema and soft tissue swelling about the left lower leg, ankle and foot. Mild distal Achilles enthesopathy. Remote well corticated avulsion fracture along the inferior aspect of the medial malleolus. No tibiotalar joint effusion is identified. Boehler's angle is maintained. Left foot: Osseous alignment is normal.  Mild degenerative changes of the 1st IP joint. No marginal erosions are identified. No acute fracture or gross dislocation is seen. The medial and middle cuneiforms demonstrate proper alignment with the base of the 1st and 2nd metatarsals respectively. No tibiotalar joint effusion is seen. Boehler's angle is maintained. Mild-to-moderate edema in the soft tissues of the left foot and ankle. Left ankle: 1. Moderate edema and soft tissue swelling about the left lower extremity. 2. No acute fracture or dislocation. Left foot: 1. Mild to moderate edema in the soft tissues of the left foot and ankle. 2. No acute fracture or dislocation. CT CHEST WO CONTRAST    Result Date: 9/11/2021  EXAMINATION: CT OF THE CHEST WITHOUT CONTRAST 9/11/2021 1:56 am TECHNIQUE: CT of the chest was performed without the administration of intravenous contrast. Multiplanar reformatted images are provided for review. Dose modulation, iterative reconstruction, and/or weight based adjustment of the mA/kV was utilized to reduce the radiation dose to as low as reasonably achievable.  COMPARISON: CT PA dated 03/27/2018. HISTORY: ORDERING SYSTEM PROVIDED HISTORY: high risk chest pain, aortic dissection? TECHNOLOGIST PROVIDED HISTORY: high risk chest pain, aortic dissection? Reason for Exam: chest pain FINDINGS: Mediastinum:  No evidence of mediastinal, hilar or axillary lymphadenopathy. Aorta is normal in caliber without acute abnormality although evaluation is limited without IV contrast.  Aortic caliber is unchanged compared to the chest CTA of 03/27/2018. The central airways are clear. Lungs/pleura: Aside from minor bibasilar atelectasis, the lungs are clear. No pneumothorax or pleural effusion. Upper Abdomen:  Limited images of the upper abdomen demonstrate no acute abnormality. Soft Tissues/Bones: Surrounding osseous and soft tissue structures are unremarkable. The IV was lost and contrast could not be given. Dissection therefore cannot be excluded. This noncontrast exam is normal for age. Findings discussed with the ordering physician 09/11/2021 at 0321 hours. XR CHEST PORTABLE    Result Date: 9/11/2021  EXAMINATION: ONE XRAY VIEW OF THE CHEST 9/11/2021 12:57 am COMPARISON: 03/28/2019 HISTORY: ORDERING SYSTEM PROVIDED HISTORY: chest pain TECHNOLOGIST PROVIDED HISTORY: chest pain Reason for Exam: upright FINDINGS: Portable study is limited by obesity. Mild right basilar opacity. Lungs are otherwise grossly clear. No pneumothorax or pleural fluid. Heart size is within normal limits. No acute bone finding. Right basilar opacity may represent airspace disease or superimposed shadows on this portable study obtained on an obese patient. When the patient is able, a follow-up PA and lateral examination of the chest would be helpful. US SCROTUM W LIMITED DUPLEX    Result Date: 9/3/2021  EXAMINATION: ULTRASOUND OF THE SCROTUM/TESTICLES WITH COLOR DOPPLER FLOW EVALUATION 9/3/2021 COMPARISON: None.  HISTORY: ORDERING SYSTEM PROVIDED HISTORY: Edema of scrotum TECHNOLOGIST PROVIDED HISTORY: This procedure can be scheduled via [address] Access your [email or web address] account by visiting [email or web address]. FINDINGS: Measurements: Right testicle: 5.0 x 2.1 x 3.0 cm Left testicle: 4.8 x 2.4 x 3.0 cm Right: Grey scale: The right testicle demonstrates unremarkable homogeneous echotexture without focal lesion. No evidence of testicular microlithiasis. Doppler Evaluation:  There is normal arterial and venous Doppler flow within the testicle. Scrotal Sac: Scrotal swelling. Large hydrocele with low-level internal echoes. Epididymis:  Nonvisualized. Left: Grey scale: The left testicle demonstrates unremarkable homogeneous echotexture without focal lesion. No evidence of testicular microlithiasis. Doppler Evaluation:  There is normal arterial and venous Doppler flow within the testicle. Scrotal Sac:  Scrotal swelling. Large hydrocele with low-level internal echoes and scrotal pearls. Epididymis:  Nonvisualized. Scrotal swelling/edema bilaterally with large complex hydroceles, and scrotal pearls on the left. Nonvisualization epididymides. No testicular abnormality. RECENT VITALS:     Temp: 98.4 °F (36.9 °C),  Pulse: 73, Resp: 13, BP: (!) 127/58, SpO2: 90 %    This patient is a 63 y.o. Male with pain 1 hour prior to arrival.  Patient was noted to have left bundle branch block. Cardiology team did not recommend heparin, would like aortic dissection ruled out although have had difficulty continuing access. Patient admitted to internal medicine team for further work-up and management. Cardiac evaluation. Rule out dissection. Patient was noted to have LIZETH. Did order IV fluid bolus. Patient has received multiple doses of morphine for symptomatic relief. OUTSTANDING TASKS / RECOMMENDATIONS:    1. Admission  2. Boarding in the emergency department waiting bed placement. FINAL IMPRESSION:     1. Chest pain due to myocardial ischemia, unspecified ischemic chest pain type    2. LBBB (left bundle branch block)    3. LIZETH (acute kidney injury) ([facility] [address]    4. Acute on chronic congestive heart failure, unspecified heart failure type (HCC)    5. Elevated troponin        DISPOSITION:         DISPOSITION:  []  Discharge   []  Transfer -    [x]  Admission -  Intermed team    []  Against Medical Advice   []  Eloped   FOLLOW-UP: No follow-up provider specified.    DISCHARGE MEDICATIONS: New Prescriptions    No medications on file           Chapincito Nance DO  Emergency Medicine Resident  [id number] [place]  Resident  09/11/21 [id number]

## 2021-09-11 NOTE — ED PROVIDER NOTES
[facility] ED  Emergency Department Encounter  EmergencyMedicine Resident     Pt Name:Miguel Chowdhury. MRN: [id number]  Birthdate 1965  Date of evaluation: 9/11/21  PCP:  Isabel Olivera MD    CHIEF COMPLAINT       Chief Complaint   Patient presents with    Chest Pain       HISTORY OF PRESENT ILLNESS  (Location/Symptom, Timing/Onset, Context/Setting, Quality, Duration, Modifying Factors, Severity.)      Lang oGnzalez is a 63 y.o. male who presents with chest pain. Patient states he has had chest pain 1 hour prior to arrival.  He was getting ready to take a shower when the pain came on all of a sudden he was able to get to his nitroglycerin where he took 2 tablets 5 minutes apart without relief call 911 he was given aspirin continued on his home oxygen of 4 L EKG done left bundle branch block arrived in the ER. He was hooked up to the monitor EKG done with continued left bundle branch block cardiology consulted and labs chest x-ray obtained. Patient continued to have chest pain morphine provided which did take the chest pain from a 10 to an 8. Patient did have chest pain with stable blood pressure he was given more morphine which did relieve the pain from a 10 to a 6. Family arrived a little while later. Extensive EMR chart reviewed. Patient with recent stress test.  Recent and prior EKGs reviewed as well. Patient states he is very compliant with his medicines not missing doses and very good about monitoring his diabetes with his freestyle floresita check his blood sugar 3 times a day and did not have any all available numbers. Patient states EMS on arrival to his house took his blood sugar and it was 198. States he was on a recent steroid burst for his gout arthritis.   PAST MEDICAL / SURGICAL / SOCIAL / FAMILY HISTORY      has a past medical history of Acute on chronic kidney failure ([address] Acute on chronic respiratory failure (HCC), Adhesive capsulitis of left shoulder, Anxiety, Arthropathy, unspecified, other specified sites, Asthma, B12 deficiency, Bilateral lower leg cellulitis, Blood in stool, CAD (coronary artery disease), Cellulitis of both lower extremities, Cellulitis of leg, left, CHF (congestive heart failure), NYHA class III ([facility]), Chronic back pain, Chronic bronchitis ([facility]), Chronic headaches, Chronic respiratory failure ([address] Chronic ulcer of left leg, with fat layer exposed ([address] Class 2 severe obesity due to excess calories with serious comorbidity and body mass index (BMI) of 35.0 to 35.9 in adult [facility]), COPD exacerbation ([address] Diabetic neuropathy ([address] Displacement of lumbar intervertebral disc without myelopathy, Ear infection, Essential hypertension, Facial cellulitis, Fall, GERD (gastroesophageal reflux disease), Head injury, Hearing loss in right ear, Hepatic steatosis, History of general anesthesia complication, History of rib fracture, Hyperlipidemia, Hypersomnia, Hypertension, Insomnia, Intolerance of continuous positive airway pressure (CPAP) ventilation, Iron deficiency, Localized rash, Magnesium deficiency, Mastoiditis of left side, Mixed conductive and sensorineural hearing loss of both ears, Mixed type COPD (chronic obstructive pulmonary disease) ([facility]), Moderate recurrent major depression ([address] Morbid obesity with BMI of 45.0-49.9, adult ([address] On home oxygen therapy, Open wound of groin, BARBY on CPAP, Osteoarthritis, Otitis externa of left ear, Pancreatitis chronic, Persistent depressive disorder, Renal insufficiency, Severe depression ([address] Spinal stenosis of lumbar region without neurogenic claudication, Syncope, Tinnitus of both ears, Type 2 diabetes mellitus with stage 3 chronic kidney disease, with long-term current use of insulin ([address] Type II or unspecified type diabetes mellitus without mention of complication, not stated as uncontrolled, Vitamin D deficiency, and Wears glasses.        has a past surgical history that includes tympanomastoidectomy (Bilateral, 2012); Coronary angioplasty with stent (2013); Hand tendon surgery (Left); Knee arthroscopy (Left); Nerve Block (07-31-15); Cardiac catheterization (2018); pr esophagogastroduodenoscopy transoral diagnostic (N/A, 2018); Intracapsular cataract extraction (Right, 2019); Intracapsular cataract extraction (Left, 2020); back surgery ( (x 4) ,.2011.2012); Colonoscopy (11/3/2015); Colonoscopy (); and Colonoscopy (N/A, 2021). Social History     Socioeconomic History    Marital status:      Spouse name: Jackson Fuller Number of children: 11    Years of education: Not on file    Highest education level: Not on file   Occupational History    Occupation: disability     Comment: unemployed   Tobacco Use    Smoking status: Former Smoker     Packs/day: 0.25     Years: 33.00     Pack years: 8.25     Types: Cigarettes     Start date: 1985     Quit date: 2020     Years since quittin.8    Smokeless tobacco: Former User     Types: Snuff     Quit date: 1995   Vaping Use    Vaping Use: Never used   Substance and Sexual Activity    Alcohol use: No     Alcohol/week: 0.0 standard drinks    Drug use: Not Currently     Types: Marijuana     Comment: Patient reports he quit using THC for 26 days on 2021    Sexual activity: Not Currently   Other Topics Concern    Not on file   Social History Narrative    Middle of possible separation 2016          Social Determinants of Health     Financial Resource Strain: Low Risk     Difficulty of Paying Living Expenses: Not hard at all   Food Insecurity: No Food Insecurity    Worried About [address] [facility] in the Last Year: Never true    Basil of Food in the Last Year: Never true   Transportation Needs: No Transportation Needs    Lack of Transportation (Medical): No    Lack of Transportation (Non-Medical):  No   Physical Activity:     Days of Exercise per Week:     Minutes of Exercise per Session:    Stress:     Feeling of Stress :    Social Connections:     Frequency of Communication with Friends and Family:     Frequency of Social Gatherings with Friends and Family:     Attends Buddhist Services:     Active Member of Clubs or Organizations:     Attends Club or Organization Meetings:     Marital Status:    Intimate Partner Violence:     Fear of Current or Ex-Partner:     Emotionally Abused:     Physically Abused:     Sexually Abused:        Family History   Problem Relation Age of Onset    Heart Disease Mother          age 64 from MI    High Blood Pressure Mother     Diabetes Mother     High Blood Pressure Father          age 80 from CKD and Lung Fibrosis    Kidney Disease Father     Heart Disease Sister     Heart Attack Sister     Obesity Sister     Diabetes Sister        Allergies:  Levofloxacin, Lorazepam, Nsaids, Prozac [fluoxetine hcl], and Vancomycin    Home Medications:  Prior to Admission medications    Medication Sig Start Date End Date Taking? Authorizing Provider   oxyCODONE HCl (OXY-IR) 10 MG immediate release tablet Take 1 tablet by mouth every 8 hours as needed for Pain for up to 30 days. 9/9/21 10/9/21  Paige Bryant MD   predniSONE (DELTASONE) 10 MG tablet Take 4 tabs X 3 days, then 3 tabs X 3 days, then 2 tabs X 3 days, then 1 tab X 3 days 9/3/21   Esperanza Lucia, APRN - CNP   allopurinol (ZYLOPRIM) 100 MG tablet Take 1 tablet by mouth daily FOR GOUT.  STOP IT IF ANY RASH DEVELOPS! 21   Yang Maldonado MD   vitamin D3 (CHOLECALCIFEROL) 25 MCG (1000 UT) TABS tablet Take 1 tablet by mouth daily 21   Yang Maldonado MD   docusate sodium (COLACE) 100 MG capsule Take 1 capsule by mouth 2 times daily For constipation 21   Yang Maldonado MD   latanoprost (XALATAN) 0.005 % ophthalmic solution 1 drop nightly    Historical Provider, MD   gabapentin (NEURONTIN) 300 MG capsule Take 1 po bid 21 11/13/21  Madalyn Saeed MD   butalbital-acetaminophen-caffeine (FIORICET, [facility]) -12 MG per tablet Take 1 tablet by mouth every 8 hours as needed for Headaches 8/13/21   Madalyn Saeed MD   clopidogrel (PLAVIX) 75 MG tablet Take 1 tablet by mouth daily 8/11/21   Daksha Saxena MD   nitroGLYCERIN (NITROSTAT) 0.4 MG SL tablet DISSOLVE 1 TAB UNDER TONGUE FOR CHEST PAIN - IF PAIN REMAINS AFTER 5 MIN, CALL 911 AND REPEAT DOSE. MAX 3 TABS IN 15 MINUTES 8/2/21   Daksha Saxena MD   pantoprazole (PROTONIX) 40 MG tablet Take 1 tablet by mouth every morning (before breakfast) 7/20/21   Daksha Saxena MD   Insulin Syringe-Needle U-100 31G X 5/16\" 1 ML MISC Use to subcutaneously inject insulin three times daily 6/28/21   Daksha Saxena MD   insulin regular human (HUMULIN R) 500 UNIT/ML concentrated injection vial Patient using 0.52ml with breakfast, 0.52ml with lunch and 0.45ml with dinner. 6/28/21   Daksha Saxena MD   escitalopram (LEXAPRO) 10 MG tablet Take 1 tablet by mouth daily 6/14/21   Daksha Saxena MD   Continuous Blood Gluc Sensor (FREESTYLE CRISTINE 14 DAY SENSOR) MISC Use one sensor every 14 days 6/2/21   Daksha Saxena MD   rosuvastatin (CRESTOR) 10 MG tablet Take 1 tablet by mouth nightly Stop pravastatin 5/21/21   Daksha Saxena MD   Continuous Blood Gluc Sensor (FREESTYLE CRISTINE 2 SENSOR) [facility] Patient to apply a new sensor every 14 days. RX to cover voucher patient will bring in. 5/19/21   Daksha Saxena MD   ammonium lactate (LAC-HYDRIN) 12 % cream Apply topically to dry skin BID. 5/14/21   Reanna Chandler DPM   sulfacetamide (BLEPH-10) 10 % ophthalmic solution  3/24/21   Historical Provider, MD   mupirocin (BACTROBAN) 2 % ointment Apply topically 2 times daily on the affected area for 7-10 days.  OK to substitute to cream 4/23/21   Daksha Saxena MD   Gauze Pads & Dressings 4\"X4\" PADS Twice a day dressing of right leg wound 4/23/21 Alvin Rodriguez MD   Gauze Bandages (ROLLED GAUZE BANDAGE 4\"X2. 5YD) MISC For twice a day dressing 4/23/21   Alvin Rodriguez MD   PROAIR  (90 Base) MCG/ACT inhaler INHALE TWO PUFFS BY MOUTH EVERY 6 HOURS AS NEEDED FOR WHEEZING OR FOR SHORTNESS OF BREATH 4/13/21   Avlin Rodriguez MD   amitriptyline (ELAVIL) 50 MG tablet Take 2 po qhs 4/9/21   Michele Valentin MD   nystatin (MYCOSTATIN) 598464 UNIT/GM powder Apply 2 times daily in the skin folds for several months 3/31/21   Alvin Rodriguez MD   tiZANidine (ZANAFLEX) 4 MG tablet TAKE ONE TABLET BY MOUTH EVERY 8 HOURS AS NEEDED FOR BACK PAIN 3/31/21   Alvin Rodriguez MD   bumetanide (BUMEX) 1 MG tablet TAKE THREE TABLETS BY MOUTH TWICE A DAY 3/8/21   Alvin Rodriguez MD   spironolactone (ALDACTONE) 50 MG tablet Take 1 tablet by mouth daily 2/24/21   Alvin Rodriguez MD   ammonium lactate (LAC-HYDRIN) 12 % lotion Apply topically daily. For dry skin 2/23/21   Alvin Rodriguez MD   clobetasol (TEMOVATE) 0.05 % ointment Apply topically 2 times daily for psoriasis 1/27/21   Alvin Rodriguez MD   ketoconazole (NIZORAL) 2 % cream Apply twice a day for yeast infection in the skin folds, for 4 weeks 1/20/21   Alvin Rodriguez MD   albuterol (PROVENTIL) (2.5 MG/3ML) 0.083% nebulizer solution Take 3 mLs by nebulization every 6 hours as needed for Wheezing or Shortness of Breath 1/15/21   Alvin Rodriguez MD   Ferrous Sulfate (IRON) 325 (65 Fe) MG TABS Take 1 tablet by mouth daily 11/25/20   Alvin Rodriguez MD   metoprolol tartrate (LOPRESSOR) 50 MG tablet Take 1 tablet by mouth 2 times daily 11/4/20   Alvin Rodriguez MD   magnesium oxide (MAG-OX) 400 (240 Mg) MG tablet Take 1 tablet by mouth daily 11/4/20   Alvin Rodriguez MD   lisinopril (PRINIVIL;ZESTRIL) 5 MG tablet Take 1 tablet by mouth daily . Discontinued Lisinopril  10 mg 11/2/20   Alvin Rodriguez MD   fluticasone-salmeterol (ADVAIR HFA) 230-21 MCG/ACT inhaler Inhale 2 puffs into the lungs 2 times daily 10/19/20   Tatiana Rocha MD   tiotropium (SPIRIVA RESPIMAT) 2.5 MCG/ACT AERS inhaler Inhale 2 puffs into the lungs daily 10/19/20   Tatiana Rocha MD   venlafaxine (EFFEXOR XR) 75 MG extended release capsule Take 1 capsule by mouth daily Take with food 10/14/20   Tatiana Rocha MD   naloxone 4 MG/0.1ML LIQD nasal spray 1 spray by Nasal route as needed for Opioid Reversal Due to COPD and sleep apnea, this is a big recommendation for you 9/30/20   Tatiana Rocha MD   blood glucose monitor strips Test 3 times a day & as needed for symptoms of irregular blood glucose. Dispense sufficient amount for indicated testing frequency plus additional to accommodate PRN testing needs. One touch Ultra blue 9/30/20   Charley Wynne MD   Lancets MISC Use to check blood sugar three times daily along with when necessary due to symptoms. 9/30/20   Charley Wynne MD   vitamin B-12 (CYANOCOBALAMIN) 500 MCG tablet Take 1 tablet by mouth daily 7/13/20   Tatiana Rocha MD   Oxygen Tubing MISC by Does not apply route DX COPD. chronic respiratory failure 3/26/20   Tatiana Rocha MD   Respiratory Therapy Supplies (NEBULIZER/TUBING/MOUTHPIECE) KIT Dx COPD needs nebulizer supplies 2/20/20   Tatiana Rocha MD   ONE TOUCH ULTRASOFT LANCETS [facility] Patient to test blood sugar up to 4 times daily. 11/7/19   Cahrley Wynne MD   Lancet Devices (LANCING DEVICE) MISC Provide patient with lancing device appropriate for his machine/lancing needles.  6/4/19   Tatiana Rocha MD   Lidocaine 4 % LOTN Apply topically    Historical Provider, MD   Spacer/Aero Chamber Mouthpiece MISC 1 each by Does not apply route once as needed (to be used with his inhalers) 4/15/19 8/13/21  Tatiana Rocha MD   metolazone (ZAROXOLYN) 2.5 MG tablet Take 2.5 mg by mouth three times a week MWF    Historical Provider, MD   Handicap Placard MISC by Does not apply route Can't walk greater than 200 feet. Expires in 5 years. 2/13/19   Paige Bryant MD   fluticasone (CUTIVATE) 0.05 % cream Apply topically 2 times daily  4/19/17   Historical Provider, MD   fluticasone (FLONASE) 50 MCG/ACT nasal spray 2 sprays by Nasal route daily  Patient taking differently: 2 sprays by Nasal route daily as needed (sinus symptoms)  1/16/17   Miriam Heath MD   Melatonin 10 MG TABS Take 10 mg by mouth nightly as needed (insomnia) 12/23/16   Miriam Heath MD   aspirin 81 MG EC tablet Take 81 mg by mouth daily. Historical Provider, MD       REVIEW OF SYSTEMS    (2-9 systems for level 4, 10 or more for level 5)      Review of Systems   Constitutional: Negative for activity change, appetite change, chills, fatigue and fever. HENT: Negative for congestion, rhinorrhea and sore throat. Eyes: Negative for photophobia, discharge and redness. Respiratory: Negative for cough, choking, shortness of breath and wheezing. Cardiovascular: Positive for chest pain, palpitations and leg swelling. Gastrointestinal: Negative for constipation, diarrhea and vomiting. Genitourinary: Negative for decreased urine volume, difficulty urinating, dysuria and frequency. Musculoskeletal: Positive for back pain and neck pain. Negative for gait problem and joint swelling. Skin: Negative for rash and wound. Allergic/Immunologic: Negative for food allergies. Neurological: Negative for dizziness, speech difficulty and headaches. Psychiatric/Behavioral: Negative for behavioral problems and confusion. PHYSICAL EXAM   (up to 7 for level 4, 8 or more for level 5)      INITIAL VITALS:   BP (!) 141/64   Pulse 75   Temp 98.4 °F (36.9 °C) (Oral)   Resp 12   Ht 6' (1.829 m)   Wt (!) 418 lb (189.6 kg)   SpO2 93%   BMI 56.69 kg/m²     Physical Exam  Vitals and nursing note reviewed. Constitutional:       General: He is in acute distress. Appearance: He is well-developed. He is obese.  He is ill-appearing and diaphoretic. Comments: BP (!) 138/54   Pulse 68   Temp 98.4 °F (36.9 °C) (Oral)   Resp 19   Ht 6' (1.829 m)   Wt (!) 418 lb (189.6 kg)   SpO2 93%   BMI 56.69 kg/m²   With a obese male in extreme distress from chest pain   HENT:      Head: Normocephalic and atraumatic. Ears:      Comments: Bilateral hearing aids     Nose: Nose normal. No congestion. Mouth/Throat:      Mouth: Mucous membranes are dry. Eyes:      General:         Right eye: No discharge. Left eye: No discharge. Conjunctiva/sclera: Conjunctivae normal.   Cardiovascular:      Rate and Rhythm: Normal rate and regular rhythm. Pulses: Normal pulses. Heart sounds: Normal heart sounds. Pulmonary:      Effort: Pulmonary effort is normal. No respiratory distress. Breath sounds: Normal breath sounds. No wheezing. Abdominal:      Tenderness: There is no abdominal tenderness. There is no guarding. Comments: Obese soft nontender skin changes from chronic injections of insulin   Musculoskeletal:      Cervical back: Normal range of motion and neck supple. Right lower leg: Edema present. Left lower leg: Edema present. Lymphadenopathy:      Cervical: No cervical adenopathy. Skin:     General: Skin is warm. Capillary Refill: Capillary refill takes less than 2 seconds. Findings: No rash. Comments: Skin changes to bilateral lower extremities Freestyle floresita on left arm. Neurological:      Mental Status: He is alert and oriented to person, place, and time. Sensory: No sensory deficit.    Psychiatric:         Mood and Affect: Mood normal.         DIFFERENTIAL  DIAGNOSIS     PLAN (LABS / IMAGING / EKG):  Orders Placed This Encounter   Procedures    COVID-19, Rapid    XR CHEST PORTABLE    CT CHEST WO CONTRAST    CBC Auto Differential    Basic Metabolic Panel w/ Reflex to MG    Troponin    Brain Natriuretic Peptide    Troponin    Troponin    Inpatient Sep 11, 2021   0017 Patient in significant chest pain. Started 1 hour ago while getting into shower. Radiates to left shoulder. ASA given in EMS. On arrival EKG with new LBBB. Will consult cardiology, provide morphine and wait for trops, cbc, bnp, bmp, cxr      [LL]   0058 Patient on a steroid burst currently for his arthritis/gout, likely WBC elevation from demargination   CBC Auto Differential(!):    WBC 16.3(!)   RBC 3.99(!)   Hemoglobin Quant 10.6(!)   Hematocrit 35.6(!)   MCV 89.2   MCH 26.6   MCHC 29.8   RDW 15.3(!)   Platelet Count 131   MPV 10.7   NRBC Automated 0.0   Differential Type NOT REPORTED   Seg Neutrophils 83(!)   Lymphocytes 7(!)   Monocytes 6   Eosinophils % 2   Basophils 1   Immature Granulocytes 1(!)   Segs Absolute 13.41(!)   Absolute Lymph # 1.20   Absolute Mono # 0.98   Absolute Eos # 0.33   Basophils Absolute 0.12   Absolute I... [LL]   8100 CXR reviewed, appears to have some fluid in bases and heart borders that appear larger than previous images/cardiomegaly. Known CHF patient. BNP not resulted yet. [LL]   2284 Troponin(!):    Troponin, High Sensitivity 48(!)   Troponin T NOT REPORTED   Troponin Interp NOT REPORTED [LL]   0224 Lost IV access in CT scan. Attempting new line now. [LL]   5000 HEART score 8. Cardiology advised admit to [facility]. Consult placed. [LL]   0986 Spoke with [facility] - plan for admission, agreeable. Patient agreeable. [LL]   2887 Troponin(!):    Troponin, High Sensitivity 43(!)   Troponin T NOT REPORTED   Troponin Interp NOT REPORTED [LL]      ED Course User Index  [LL] Haresh Soriano MD         PROCEDURES:  none    CONSULTS:  IP CONSULT TO CARDIOLOGY  IP CONSULT TO HOSPITALIST  IP CONSULT TO FAMILY MEDICINE  IP CONSULT TO CARDIOLOGY    CRITICAL CARE:  Please see attending note    FINAL IMPRESSION      1. Chest pain due to myocardial ischemia, unspecified ischemic chest pain type    2. LBBB (left bundle branch block)    3.  LIZETH (acute kidney injury) ([facility]    4. Acute on chronic congestive heart failure, unspecified heart failure type ([facility]    5. Elevated troponin          DISPOSITION / PLAN     DISPOSITION Decision To Admit 09/11/2021 03:55:46 AM      PATIENT REFERRED TO:  No follow-up provider specified.     DISCHARGE MEDICATIONS:  New Prescriptions    No medications on file       Rajendra Quezada MD  Emergency Medicine Resident    (Please note that portions of thisnote were completed with a voice recognition program.  Efforts were made to edit the dictations but occasionally words are mis-transcribed.)       Rajendra Quezada MD  Resident  09/11/21 [id number]

## 2021-09-11 NOTE — ED NOTES
Bed: 20  Expected date:   Expected time:   Means of arrival:   Comments:  EMANI Torrez RN  09/11/21 0001

## 2021-09-11 NOTE — ED NOTES
Pt's IV infiltrated twice during CT scan. Dr. Eva Jordan and Dr. Radha Walsh aware. Dr. Radha Walsh states non-contrast CT is okay for now.       Rodriguez Martinez RN  09/11/21 [id number]

## 2021-09-11 NOTE — CONSULTS
Attestation signed by         [facility]   Consultation Note               Today's Date: 9/11/2021  Patient Name: Annie Watt. Date of admission: 9/11/2021 12:01 AM  Patient's age: 63 y. o., 1965  Admission Dx: Chest pain [R07.9]    Reason for Consult:  Chest pain    Requesting Physician: No admitting provider for patient encounter. CHIEF COMPLAINT:    Chief Complaint   Patient presents with    Chest Pain       History Obtained From:  Patient and EMR    HISTORY OF PRESENT ILLNESS:      69-year-old gentleman with past medical history of CAD s/p OM1 stent presented to the hospital with complaints of chest pain.   He was noted to have left bundle branch block is new compared to his prior EKG in 2018        Past Medical History:   has a past medical history of Acute on chronic kidney failure ([address] Acute on chronic respiratory failure ([address] Adhesive capsulitis of left shoulder, Anxiety, Arthropathy, unspecified, other specified sites, Asthma, B12 deficiency, Bilateral lower leg cellulitis, Blood in stool, CAD (coronary artery disease), Cellulitis of both lower extremities, Cellulitis of leg, left, CHF (congestive heart failure), NYHA class III ([facility]), Chronic back pain, Chronic bronchitis ([address] Chronic headaches, Chronic respiratory failure ([address] Chronic ulcer of left leg, with fat layer exposed ([address] Class 2 severe obesity due to excess calories with serious comorbidity and body mass index (BMI) of 35.0 to 35.9 in adult [facility]), COPD exacerbation ([address] Diabetic neuropathy ([address] Displacement of lumbar intervertebral disc without myelopathy, Ear infection, Essential hypertension, Facial cellulitis, Fall, GERD (gastroesophageal reflux disease), Head injury, Hearing loss in right ear, Hepatic steatosis, History of general anesthesia complication, History of rib fracture, Hyperlipidemia, Hypersomnia, Hypertension, Insomnia, Intolerance of continuous positive airway pressure (CPAP) ventilation, Iron deficiency, Localized rash, Magnesium deficiency, Mastoiditis of left side, Mixed conductive and sensorineural hearing loss of both ears, Mixed type COPD (chronic obstructive pulmonary disease) (HCC), Moderate recurrent major depression ([address] Morbid obesity with BMI of 45.0-49.9, adult ([address] On home oxygen therapy, Open wound of groin, BARBY on CPAP, Osteoarthritis, Otitis externa of left ear, Pancreatitis chronic, Persistent depressive disorder, Renal insufficiency, Severe depression ([address] Spinal stenosis of lumbar region without neurogenic claudication, Syncope, Tinnitus of both ears, Type 2 diabetes mellitus with stage 3 chronic kidney disease, with long-term current use of insulin ([address] Type II or unspecified type diabetes mellitus without mention of complication, not stated as uncontrolled, Vitamin D deficiency, and Wears glasses. Past Surgical History:   has a past surgical history that includes tympanomastoidectomy (Bilateral, 09/20/2012); Coronary angioplasty with stent (March 2013); Hand tendon surgery (Left); Knee arthroscopy (Left); Nerve Block (07-31-15); Cardiac catheterization (04/23/2018); pr esophagogastroduodenoscopy transoral diagnostic (N/A, 7/18/2018); Intracapsular cataract extraction (Right, 11/5/2019); Intracapsular cataract extraction (Left, 1/7/2020); back surgery ( (x 4) 2000,.12/2011.2/2012); Colonoscopy (11/3/2015); Colonoscopy (2013); and Colonoscopy (N/A, 4/12/2021). Home Medications:    Prior to Admission medications    Medication Sig Start Date End Date Taking? Authorizing Provider   oxyCODONE HCl (OXY-IR) 10 MG immediate release tablet Take 1 tablet by mouth every 8 hours as needed for Pain for up to 30 days.  9/9/21 10/9/21  Paige Bryant MD   predniSONE (DELTASONE) 10 MG tablet Take 4 tabs X 3 days, then 3 tabs X 3 days, then 2 tabs X 3 days, then 1 tab X 3 days 9/3/21   Esperanza Lucia APRN - CNP   allopurinol (ZYLOPRIM) 100 MG tablet Take 1 tablet by mouth daily FOR GOUT. STOP IT IF ANY RASH DEVELOPS! 8/30/21   Lennox [place], MD   vitamin D3 (CHOLECALCIFEROL) 25 MCG (1000 UT) TABS tablet Take 1 tablet by mouth daily 8/25/21   Lennox [place], MD   docusate sodium (COLACE) 100 MG capsule Take 1 capsule by mouth 2 times daily For constipation 8/17/21   Lennox [place], MD   latanoprost (XALATAN) 0.005 % ophthalmic solution 1 drop nightly    Historical Provider, MD   gabapentin (NEURONTIN) 300 MG capsule Take 1 po bid 8/13/21 11/13/21  Alan Humphries MD   butalbital-acetaminophen-caffeine (FIORICET, [facility]) -33 MG per tablet Take 1 tablet by mouth every 8 hours as needed for Headaches 8/13/21   Alan Humphries MD   clopidogrel (PLAVIX) 75 MG tablet Take 1 tablet by mouth daily 8/11/21   Lennox [place], MD   nitroGLYCERIN (NITROSTAT) 0.4 MG SL tablet DISSOLVE 1 TAB UNDER TONGUE FOR CHEST PAIN - IF PAIN REMAINS AFTER 5 MIN, CALL 911 AND REPEAT DOSE. MAX 3 TABS IN 15 MINUTES 8/2/21   Lennox [place], MD   pantoprazole (PROTONIX) 40 MG tablet Take 1 tablet by mouth every morning (before breakfast) 7/20/21   Lennox [place], MD   Insulin Syringe-Needle U-100 31G X 5/16\" 1 ML MISC Use to subcutaneously inject insulin three times daily 6/28/21   Lennox [place], MD   insulin regular human (HUMULIN R) 500 UNIT/ML concentrated injection vial Patient using 0.52ml with breakfast, 0.52ml with lunch and 0.45ml with dinner. 6/28/21   Lennox [place], MD   escitalopram (LEXAPRO) 10 MG tablet Take 1 tablet by mouth daily 6/14/21   Lennox [place], MD   Continuous Blood Gluc Sensor (FREESTYLE CRISTINE 14 DAY SENSOR) MISC Use one sensor every 14 days 6/2/21   Lennox [place], MD   rosuvastatin (CRESTOR) 10 MG tablet Take 1 tablet by mouth nightly Stop pravastatin 5/21/21   Lennox [place], MD   Continuous Blood Gluc Sensor (FREESTYLE CRISTINE 2 SENSOR) [facility] Patient to apply a new sensor every 14 days.   RX to cover voucher patient will bring in. 5/19/21   Emmie Rosenthal MD   ammonium lactate (LAC-HYDRIN) 12 % cream Apply topically to dry skin BID. 5/14/21   Earnest Carrera DPM   sulfacetamide (BLEPH-10) 10 % ophthalmic solution  3/24/21   Stewart Henriquez MD   mupirocin (BACTROBAN) 2 % ointment Apply topically 2 times daily on the affected area for 7-10 days. OK to substitute to cream 4/23/21   Emmie Rosenthal MD   Gauze Pads & Dressings 4\"X4\" PADS Twice a day dressing of right leg wound 4/23/21   Emmie Rosenthal MD   Gauze Bandages (ROLLED GAUZE BANDAGE 4\"X2. 5YD) MISC For twice a day dressing 4/23/21   Emmie Rosenthal MD   PROAIR  (90 Base) MCG/ACT inhaler INHALE TWO PUFFS BY MOUTH EVERY 6 HOURS AS NEEDED FOR WHEEZING OR FOR SHORTNESS OF BREATH 4/13/21   Emmie Rosenthal MD   amitriptyline (ELAVIL) 50 MG tablet Take 2 po qhs 4/9/21   Yolette Stewart MD   nystatin (MYCOSTATIN) 718940 UNIT/GM powder Apply 2 times daily in the skin folds for several months 3/31/21   Emmie Rosenthal MD   tiZANidine (ZANAFLEX) 4 MG tablet TAKE ONE TABLET BY MOUTH EVERY 8 HOURS AS NEEDED FOR BACK PAIN 3/31/21   Emmie Rosenthal MD   bumetanide (BUMEX) 1 MG tablet TAKE THREE TABLETS BY MOUTH TWICE A DAY 3/8/21   Emmie Rosenthal MD   spironolactone (ALDACTONE) 50 MG tablet Take 1 tablet by mouth daily 2/24/21   Emmie Rosenthal MD   ammonium lactate (LAC-HYDRIN) 12 % lotion Apply topically daily.  For dry skin 2/23/21   Emmie Rosenthal MD   clobetasol (TEMOVATE) 0.05 % ointment Apply topically 2 times daily for psoriasis 1/27/21   Emmie Rosenthal MD   ketoconazole (NIZORAL) 2 % cream Apply twice a day for yeast infection in the skin folds, for 4 weeks 1/20/21   Emmie Rosenthal MD   albuterol (PROVENTIL) (2.5 MG/3ML) 0.083% nebulizer solution Take 3 mLs by nebulization every 6 hours as needed for Wheezing or Shortness of Breath 1/15/21   Emmie Rosenthal MD   Ferrous Sulfate (IRON) 325 (65 Fe) MG TABS Take 1 tablet by mouth daily 11/25/20   Reina Gibson MD   metoprolol tartrate (LOPRESSOR) 50 MG tablet Take 1 tablet by mouth 2 times daily 11/4/20   Reina Gibson MD   magnesium oxide (MAG-OX) 400 (240 Mg) MG tablet Take 1 tablet by mouth daily 11/4/20   Reina Gibson MD   lisinopril (PRINIVIL;ZESTRIL) 5 MG tablet Take 1 tablet by mouth daily . Discontinued Lisinopril  10 mg 11/2/20   Reina Gibson MD   fluticasone-salmeterol (ADVAIR HFA) 230-21 MCG/ACT inhaler Inhale 2 puffs into the lungs 2 times daily 10/19/20   Reina Gibson MD   tiotropium (SPIRIVA RESPIMAT) 2.5 MCG/ACT AERS inhaler Inhale 2 puffs into the lungs daily 10/19/20   Reina Gibson MD   venlafaxine (EFFEXOR XR) 75 MG extended release capsule Take 1 capsule by mouth daily Take with food 10/14/20   Reina Gibson MD   naloxone 4 MG/0.1ML LIQD nasal spray 1 spray by Nasal route as needed for Opioid Reversal Due to COPD and sleep apnea, this is a big recommendation for you 9/30/20   Reina Gibson MD   blood glucose monitor strips Test 3 times a day & as needed for symptoms of irregular blood glucose. Dispense sufficient amount for indicated testing frequency plus additional to accommodate PRN testing needs. One touch Ultra blue 9/30/20   Mary Lucas MD   Lancets MISC Use to check blood sugar three times daily along with when necessary due to symptoms. 9/30/20   Mary Lucas MD   vitamin B-12 (CYANOCOBALAMIN) 500 MCG tablet Take 1 tablet by mouth daily 7/13/20   Reina Gibson MD   Oxygen Tubing MISC by Does not apply route DX COPD. chronic respiratory failure 3/26/20   Reina Gibson MD   Respiratory Therapy Supplies (NEBULIZER/TUBING/MOUTHPIECE) KIT Dx COPD needs nebulizer supplies 2/20/20   Reina Gibson MD   ONE TOUCH ULTRASOFT LANCETS [address] Patient to test blood sugar up to 4 times daily.  11/7/19   Mary Lucas MD   Lancet Devices (LANCING DEVICE) MISC Provide patient with lancing device appropriate for his machine/lancing needles. 6/4/19   Michelle Pittman MD   Lidocaine 4 % LOTN Apply topically    Historical Provider, MD   Spacer/Aero Chamber Mouthpiece MISC 1 each by Does not apply route once as needed (to be used with his inhalers) 4/15/19 8/13/21  Michelle Pittman MD   metolazone (ZAROXOLYN) 2.5 MG tablet Take 2.5 mg by mouth three times a week MWF    Historical Provider, MD   Handgarima [place] MISC by Does not apply route Can't walk greater than 200 feet. Expires in 5 years. 2/13/19   Paige Bryant MD   fluticasone (CUTIVATE) 0.05 % cream Apply topically 2 times daily  4/19/17   Historical Provider, MD   fluticasone (FLONASE) 50 MCG/ACT nasal spray 2 sprays by Nasal route daily  Patient taking differently: 2 sprays by Nasal route daily as needed (sinus symptoms)  1/16/17   Michelle Pittman MD   Melatonin 10 MG TABS Take 10 mg by mouth nightly as needed (insomnia) 12/23/16   Michelle Pittman MD   aspirin 81 MG EC tablet Take 81 mg by mouth daily.     Historical Provider, MD      Current Facility-Administered Medications: melatonin tablet 10.5 mg, 10.5 mg, Oral, Nightly PRN  fluticasone (FLONASE) 50 MCG/ACT nasal spray 2 spray, 2 spray, Nasal, Daily PRN  [START ON 9/13/2021] metOLazone (ZAROXOLYN) tablet 2.5 mg, 2.5 mg, Oral, Once per day on Mon Wed Fri  vitamin B-12 (CYANOCOBALAMIN) tablet 500 mcg, 500 mcg, Oral, Daily  venlafaxine (EFFEXOR XR) extended release capsule 75 mg, 75 mg, Oral, Daily  tiotropium (SPIRIVA RESPIMAT) 2.5 MCG/ACT inhaler 2 puff, 2 puff, Inhalation, Daily  lisinopril (PRINIVIL;ZESTRIL) tablet 5 mg, 5 mg, Oral, Daily  metoprolol tartrate (LOPRESSOR) tablet 50 mg, 50 mg, Oral, BID  magnesium oxide (MAG-OX) tablet 400 mg, 400 mg, Oral, Daily  ferrous sulfate (FE TABS 325) EC tablet 325 mg, 325 mg, Oral, Daily  spironolactone (ALDACTONE) tablet 50 mg, 50 mg, Oral, Daily  rosuvastatin (CRESTOR) tablet 10 mg, 10 mg, Oral, Nightly  escitalopram (LEXAPRO) tablet 10 mg, 10 mg, Oral, Daily  [START ON 9/12/2021] pantoprazole (PROTONIX) tablet 40 mg, 40 mg, Oral, QAM AC  clopidogrel (PLAVIX) tablet 75 mg, 75 mg, Oral, Daily  latanoprost (XALATAN) 0.005 % ophthalmic solution 1 drop, 1 drop, Both Eyes, Nightly  butalbital-acetaminophen-caffeine (FIORICET, ESGIC) per tablet 1 tablet, 1 tablet, Oral, Q8H PRN  docusate sodium (COLACE) capsule 100 mg, 100 mg, Oral, BID  Vitamin D (CHOLECALCIFEROL) tablet 1,000 Units, 1,000 Units, Oral, Daily  allopurinol (ZYLOPRIM) tablet 100 mg, 100 mg, Oral, Daily  amitriptyline (ELAVIL) tablet 100 mg, 100 mg, Oral, Nightly  bumetanide (BUMEX) tablet 3 mg, 3 mg, Oral, BID  gabapentin (NEURONTIN) capsule 300 mg, 300 mg, Oral, BID  tiZANidine (ZANAFLEX) tablet 4 mg, 4 mg, Oral, Q8H PRN  sodium chloride flush 0.9 % injection 5-40 mL, 5-40 mL, IntraVENous, 2 times per day  sodium chloride flush 0.9 % injection 10 mL, 10 mL, IntraVENous, PRN  0.9 % sodium chloride infusion, 25 mL, IntraVENous, PRN  potassium chloride (KLOR-CON M) extended release tablet 40 mEq, 40 mEq, Oral, PRN **OR** potassium bicarb-citric acid (EFFER-K) effervescent tablet 40 mEq, 40 mEq, Oral, PRN **OR** potassium chloride 10 mEq/100 mL IVPB (Peripheral Line), 10 mEq, IntraVENous, PRN  magnesium sulfate 1000 mg in dextrose 5% 100 mL IVPB, 1,000 mg, IntraVENous, PRN  ondansetron (ZOFRAN-ODT) disintegrating tablet 4 mg, 4 mg, Oral, Q8H PRN **OR** ondansetron (ZOFRAN) injection 4 mg, 4 mg, IntraVENous, Q6H PRN  acetaminophen (TYLENOL) tablet 650 mg, 650 mg, Oral, Q6H PRN **OR** acetaminophen (TYLENOL) suppository 650 mg, 650 mg, Rectal, Q6H PRN  magnesium hydroxide (MILK OF MAGNESIA) 400 MG/5ML suspension 30 mL, 30 mL, Oral, Daily PRN  nitroGLYCERIN (NITROSTAT) SL tablet 0.4 mg, 0.4 mg, SubLINGual, Q5 Min PRN  [START ON 9/12/2021] aspirin tablet 325 mg, 325 mg, Oral, Daily  nitroGLYCERIN 50 mg in dextrose 5% 250 mL infusion, 5-200 mcg/min, IntraVENous, Continuous  heparin (porcine) injection 4,000 Units, 4,000 Units, IntraVENous, PRN  heparin (porcine) injection 2,000 Units, 2,000 Units, IntraVENous, PRN  heparin 25,000 units in dextrose 5% 250 mL (premix) infusion, 5 Units/kg/hr, IntraVENous, Continuous      Allergies:  Levofloxacin, Lorazepam, Nsaids, Prozac [fluoxetine hcl], and Vancomycin      Social History:   reports that he quit smoking about 10 months ago. His smoking use included cigarettes. He started smoking about 36 years ago. He has a 8.25 pack-year smoking history. He quit smokeless tobacco use about 26 years ago. His smokeless tobacco use included snuff. He reports previous drug use. Drug: Marijuana. He reports that he does not drink alcohol. Family History: family history includes Diabetes in his mother and sister; Heart Attack in his sister; Heart Disease in his mother and sister; High Blood Pressure in his father and mother; Kidney Disease in his father; Obesity in his sister. No h/o sudden cardiac death. REVIEW OF SYSTEMS:    Constitutional: there has been no unanticipated weight loss. Eyes: No visual changes or diplopia. ENT: No Headaches  Cardiovascular: see above  Respiratory: No previous pulmonary problems, No cough  Gastrointestinal: No abdominal pain. No change in bowel or bladder habits. Genitourinary: No dysuria, trouble voiding, or hematuria. Musculoskeletal:  No gait disturbance, No weakness or joint complaints. Integumentary: No rash or pruritis. Neurological: No headache, diplopia      PHYSICAL EXAM:      /64   Pulse 65   Temp 98.4 °F (36.9 °C) (Oral)   Resp 12   Ht 6' (1.829 m)   Wt (!) 418 lb (189.6 kg)   SpO2 93%   BMI 56.69 kg/m²    Constitutional and General Appearance: Alert, no distress  Respiratory:  No increased work of breathing. Clear to auscultation bilaterally. No wheeze or crackles.   Cardiovascular:  Normal S1 and S2.   Jugular venous pulsation Normal  Abdomen: Soft  No tenderness  Extremities:  No lower extremity edema  Neurologic:  Alert and oriented. Moves all extremities well      DATA:    Diagnostics:    Labs:     CBC:   Recent Labs     09/11/21  0041 09/11/21  1045   WBC 16.3* 12.7*   HGB 10.6* 10.5*   HCT 35.6* 34.7*    266     BMP:   Recent Labs     09/11/21  0041      K 4.5   CO2 25   BUN 68*   CREATININE 2.23*   LABGLOM 31*   GLUCOSE 157*     BNP: No results for input(s): BNP in the last 72 hours. PT/INR: No results for input(s): PROTIME, INR in the last 72 hours. APTT:  Recent Labs     09/11/21  1045   APTT 23.0     CARDIAC ENZYMES:  Recent Labs     09/11/21  0147 09/11/21 0347 09/11/21  1058   TROPHS 48* 43* 39*     No results for input(s): CKTOTAL, CKMB, CKMBINDEX, TROPONINI in the last 72 hours. Invalid input(s):  [address]  Recent Labs     09/11/21  0147 09/11/21  0347 09/11/21  1058   TROPONINT NOT REPORTED NOT REPORTED NOT REPORTED     FASTING LIPID PANEL:  Lab Results   Component Value Date    HDL 32 07/24/2021    HDL 24 07/01/2014    LDLDIRECT 72 03/13/2013    TRIG 213 07/24/2021     LIVER PROFILE:No results for input(s): AST, ALT, LABALBU in the last 72 hours. EKG: Left  Bundle branch block    ECHO 3/2021: Contrast was utilized on this technically difficult study. Left ventricle is normal in size. Mild left ventricular hypertrophy. Global left ventricular systolic function is normal. Estimated LV EF 50-55  %. No significant valvular regurgitation or stenosis seen. STRESS 2018: Findings concerning   for reversible ischemia of the mid and anterior inferior wall as well as the   apical lateral wall. 2. Left ventricular ejection fraction of 53%. 3.  Please see report for EKG portion of the examination which will be   performed separately by physician from cardiology.    Risk stratification:  High risk         IMPRESSION:    Unstable angina  New LBBB  CAD with hx of PCI to OM1 in 2016  Positive stress test in 03/2021      RECOMMENDATIONS:  Continue ASA and plavix  Start patient on heparin gtt and nitro gtt for chest pain  Plan for cardiac cath on Monday 9/12. Keep NPO midnight 9/12      Thank you for allowing us to participate in Lisa Luna Jr.'s care. Electronically signed on 09/11/21 at 2:15 PM by:    Nettie Bello MD, MD   Fellow, [address] [place] [address]      Attending Physician Statement:    I have discussed the care of  Annie Watt. , including pertinent history and exam findings, with the Cardiology fellow/resident. I have seen and examined the patient and the key elements of all parts of the encounter have been performed by me. I agree with the assessment, plan and orders as documented by the fellow/resident, after I modified exam findings and plan of treatments, and the final version is my approved version of the assessment. Additional Comments:     Patient with known hx of CAD post PCI to  in 2016, patent stent on last cath in 2018, admitted with typical chest pain associated with diaphoresis lasting > 15 minutes, ECG shows LBBB (not present on last ECG in 2018). HS trops minimally elevated. Recent stress test showed subtle inferior wall perfusion defect with septal hypokinesis. Start nitro and heparin drip   Continue asa, plavix and statin   LHC/coronary angiography on Monday after nephrology clearance to r/o any underlying critical coronary artery stenosis. I discussed in detail the risks, benefits, and alternatives to the procedure including but not limited to risk of bleeding/hematoma requiring surgical intervention, contrast induced allergy and/or nephropathy, arrythmia, CVA, MI or death. The patient verbalized understanding and wishes to proceed.

## 2021-09-11 NOTE — ED NOTES
Spoke with Dr. Ute Collado regarding pt current liquid diet, and orders for insulin and blood glucose checks.   states she will place orders       Nolan Shay RN  09/11/21 0338

## 2021-09-11 NOTE — ED NOTES
See MAR for nitro drip, rate decreased d/t patient bp 96/50. Pt denies chest pain. Patient is aox4.       Sylwia Graham RN  09/11/21 [id number]

## 2021-09-11 NOTE — H&P
45-->43-->39-->40. EKG w LBBB, ,  Glucose 157, BUN/Cr 68/2.23 ( 33/1.6 on 7/24). WBC 16.3, hgb 10.6 (his baseline). CT chest:  Done without contrast due to loss of IV, Bibasilar atelectasis. Pt was started on a NTG and Heparin drip.   Cardiology is planning heart cath 9/13    Past Medical History:     Past Medical History:   Diagnosis Date    Acute on chronic kidney failure ([address] 7/20/2017    Acute on chronic respiratory failure (HCC) 10/2/2018    Adhesive capsulitis of left shoulder 3/25/2017    Anxiety 10/02/2016    smokes marijuana for this    Arthropathy, unspecified, other specified sites 6/13/2013    Asthma     B12 deficiency     Bilateral lower leg cellulitis 2/17/2016    Blood in stool     CAD (coronary artery disease)     Cellulitis of both lower extremities 5/25/2017    Cellulitis of leg, left 07/20/2017    CHF (congestive heart failure), NYHA class III ([facility]) 8/14/2013    Chronic back pain     Chronic bronchitis ([facility])     Chronic headaches     was referred to neuro, testing scheduled    Chronic respiratory failure ([address]     was on vent    Chronic ulcer of left leg, with fat layer exposed ([address] 02/22/2019    healed    Class 2 severe obesity due to excess calories with serious comorbidity and body mass index (BMI) of 35.0 to 35.9 in adult ([address]     (BMI 35.0-39.9 without comorbidity)    COPD exacerbation ([address] 11/2/2016    Diabetic neuropathy ([address] 8/14/2013    Displacement of lumbar intervertebral disc without myelopathy 6/13/2013    Ear infection     RIGHT    Essential hypertension     Facial cellulitis 2012    Fall 3/25/2017    GERD (gastroesophageal reflux disease)     Head injury     Hearing loss in right ear     pencil pierced ear as a child    Hepatic steatosis 12/3/2015    History of general anesthesia complication     has woke up during surgery under anesthesia    History of rib fracture 12/3/2015    Chronic     Hyperlipidemia     Hypersomnia can go multiple days without sleeping    Hypertension     Insomnia     Intolerance of continuous positive airway pressure (CPAP) ventilation 7/20/2017    Iron deficiency     Localized rash     gets frequently in axilla, groin, in any fold, on several topical treatments for this    Magnesium deficiency     Mastoiditis of left side     Mixed conductive and sensorineural hearing loss of both ears 1/10/2017    Per ENT    Mixed type COPD (chronic obstructive pulmonary disease) ([address]     On home O2, multiple inhlaers, nebulizer    Moderate recurrent major depression ([address] 10/2/2016    Morbid obesity with BMI of 45.0-49.9, adult ([address] 6/16/2015    On home oxygen therapy     3 Lpm prn    Open wound of groin 12/19/2018    healed     BARBY on CPAP     Osteoarthritis     Otitis externa of left ear     Pancreatitis chronic     Persistent depressive disorder 11/19/2019    Renal insufficiency     proteinuria    Severe depression ([address] 9/25/2013    Spinal stenosis of lumbar region without neurogenic claudication 1/6/2016    MRI lumbar 12/30/15 L3-L4: There is broad-based bulging disc which appears protruding left laterally causing flattening of the ventral thecal sac. In addition, there is facet arthropathy with mild hypertrophic changes.  There is borderline central canal stenosis with  evidence of moderate left neural foraminal narrowing and mild right neural foramina narrowing.   L4-L5: There is broad-based protrud    Syncope 4/28/2017    Tinnitus of both ears 1/10/2017    Per ENT    Type 2 diabetes mellitus with stage 3 chronic kidney disease, with long-term current use of insulin ([address] 12/26/2016    due to underlying condition with hyperosmolarity without coma    Type II or unspecified type diabetes mellitus without mention of complication, not stated as uncontrolled     uncontrolled    Vitamin D deficiency     Wears glasses     for reading        Past Surgical History:     Past Surgical History: Procedure Laterality Date    BACK SURGERY   (x 4) 2000,.12/2011.2/2012     Dr Conrad Burt last [address]  04/23/2018    no stenting    COLONOSCOPY  11/3/2015    hemorrhoids, poor prep, not done    COLONOSCOPY  2013    COLONOSCOPY N/A 4/12/2021    COLONOSCOPY POLYPECTOMY SNARE/COLD BIOPSY/HOT BIOPSY/CLIP APPLICATION X1 performed by Christine Estevez MD at [address] [facility]  March 2013    x 1    HAND TENDON SURGERY Left     thumb tendon repair    INTRACAPSULAR CATARACT EXTRACTION Right 11/5/2019    EYE CATARACT EMULSIFICATION IOL IMPLANT performed by Adolfo Shaw MD at [address] [facility] Left 1/7/2020    EYE CATARACT EMULSIFICATION IOL IMPLANT performed by Adolfo Shaw MD at [address] [facility] ARTHROSCOPY Left     NERVE BLOCK  07-31-15    TENS unit    WA ESOPHAGOGASTRODUODENOSCOPY TRANSORAL DIAGNOSTIC N/A 7/18/2018    EGD ESOPHAGOGASTRODUODENOSCOPY performed by Christine Estevez MD at [facility] Bilateral 09/20/2012    Dr Guanaco Clements        Medications Prior to Admission:     Prior to Admission medications    Medication Sig Start Date End Date Taking? Authorizing Provider   oxyCODONE HCl (OXY-IR) 10 MG immediate release tablet Take 1 tablet by mouth every 8 hours as needed for Pain for up to 30 days. 9/9/21 10/9/21  Paige Bryant MD   predniSONE (DELTASONE) 10 MG tablet Take 4 tabs X 3 days, then 3 tabs X 3 days, then 2 tabs X 3 days, then 1 tab X 3 days 9/3/21   Esperanza Lucia, APRN - CNP   allopurinol (ZYLOPRIM) 100 MG tablet Take 1 tablet by mouth daily FOR GOUT.  STOP IT IF ANY RASH DEVELOPS! 8/30/21   Miky Lu MD   vitamin D3 (CHOLECALCIFEROL) 25 MCG (1000 UT) TABS tablet Take 1 tablet by mouth daily 8/25/21   Miky Lu MD   docusate sodium (COLACE) 100 MG capsule Take 1 capsule by mouth 2 times daily For constipation 8/17/21   Miky Lu MD latanoprost (XALATAN) 0.005 % ophthalmic solution 1 drop nightly    Historical Provider, MD   gabapentin (NEURONTIN) 300 MG capsule Take 1 po bid 8/13/21 11/13/21  Yolette Stewart MD   butalbital-acetaminophen-caffeine [facility]) -65 MG per tablet Take 1 tablet by mouth every 8 hours as needed for Headaches 8/13/21   Yolette Stewart MD   clopidogrel (PLAVIX) 75 MG tablet Take 1 tablet by mouth daily 8/11/21   Emmie Rosenthal MD   nitroGLYCERIN (NITROSTAT) 0.4 MG SL tablet DISSOLVE 1 TAB UNDER TONGUE FOR CHEST PAIN - IF PAIN REMAINS AFTER 5 MIN, CALL 911 AND REPEAT DOSE. MAX 3 TABS IN 15 MINUTES 8/2/21   Emmie Rosenthal MD   pantoprazole (PROTONIX) 40 MG tablet Take 1 tablet by mouth every morning (before breakfast) 7/20/21   Emmie Rosenthal MD   Insulin Syringe-Needle U-100 31G X 5/16\" 1 ML MISC Use to subcutaneously inject insulin three times daily 6/28/21   Emmie Rosenthal MD   insulin regular human (HUMULIN R) 500 UNIT/ML concentrated injection vial Patient using 0.52ml with breakfast, 0.52ml with lunch and 0.45ml with dinner. 6/28/21   Emmie Rosenthal MD   escitalopram (LEXAPRO) 10 MG tablet Take 1 tablet by mouth daily 6/14/21   Emmie Rosenthal MD   Continuous Blood Gluc Sensor (FREESTYLE CRISTINE 14 DAY SENSOR) MISC Use one sensor every 14 days 6/2/21   Emmie Rosenthal MD   rosuvastatin (CRESTOR) 10 MG tablet Take 1 tablet by mouth nightly Stop pravastatin 5/21/21   Emmie Rosenthal MD   Continuous Blood Gluc Sensor (FREESTYLE CRISTINE 2 SENSOR) [facility] Patient to apply a new sensor every 14 days.   RX to cover voucher patient will bring in. 5/19/21   Emmie Rosenthal MD   ammonium lactate (LAC-HYDRIN) 12 % cream Apply topically to dry skin BID. 5/14/21   Earnest Carrera DPM   sulfacetamide (BLEPH-10) 10 % ophthalmic solution  3/24/21   Historical Provider, MD   mupirocin (BACTROBAN) 2 % ointment Apply topically 2 times daily on the affected area for 7-10 days. OK to substitute to cream 4/23/21   Yang Maldonado MD   Gauze Pads & Dressings 4\"X4\" PADS Twice a day dressing of right leg wound 4/23/21   Yang Maldonado MD   Gauze Bandages (ROLLED GAUZE BANDAGE 4\"X2. 5YD) MISC For twice a day dressing 4/23/21   Yang Maldonado MD   PROAIR  (90 Base) MCG/ACT inhaler INHALE TWO PUFFS BY MOUTH EVERY 6 HOURS AS NEEDED FOR WHEEZING OR FOR SHORTNESS OF BREATH 4/13/21   Yang Maldonado MD   amitriptyline (ELAVIL) 50 MG tablet Take 2 po qhs 4/9/21   Donald Lozano MD   nystatin (MYCOSTATIN) 767618 UNIT/GM powder Apply 2 times daily in the skin folds for several months 3/31/21   Yang Maldonado MD   tiZANidine (ZANAFLEX) 4 MG tablet TAKE ONE TABLET BY MOUTH EVERY 8 HOURS AS NEEDED FOR BACK PAIN 3/31/21   Yang Maldonado MD   bumetanide (BUMEX) 1 MG tablet TAKE THREE TABLETS BY MOUTH TWICE A DAY 3/8/21   Yang Maldonado MD   spironolactone (ALDACTONE) 50 MG tablet Take 1 tablet by mouth daily 2/24/21   Yang Maldonado MD   ammonium lactate (LAC-HYDRIN) 12 % lotion Apply topically daily.  For dry skin 2/23/21   Yang Maldonado MD   clobetasol (TEMOVATE) 0.05 % ointment Apply topically 2 times daily for psoriasis 1/27/21   Yang Maldonado MD   ketoconazole (NIZORAL) 2 % cream Apply twice a day for yeast infection in the skin folds, for 4 weeks 1/20/21   Yang Maldonado MD   albuterol (PROVENTIL) (2.5 MG/3ML) 0.083% nebulizer solution Take 3 mLs by nebulization every 6 hours as needed for Wheezing or Shortness of Breath 1/15/21   Yang Maldonado MD   Ferrous Sulfate (IRON) 325 (65 Fe) MG TABS Take 1 tablet by mouth daily 11/25/20   Yang Maldonado MD   metoprolol tartrate (LOPRESSOR) 50 MG tablet Take 1 tablet by mouth 2 times daily 11/4/20   Yang Maldonado MD   magnesium oxide (MAG-OX) 400 (240 Mg) MG tablet Take 1 tablet by mouth daily 11/4/20   Yang Maldonado MD   lisinopril (PRINIVIL;ZESTRIL) 5 MG tablet Take 1 tablet by mouth daily . Discontinued Lisinopril  10 mg 11/2/20   Killian Gutierres MD   fluticasone-salmeterol (ADVAIR HFA) 230-21 MCG/ACT inhaler Inhale 2 puffs into the lungs 2 times daily 10/19/20   Killian Gutierres MD   tiotropium (SPIRIVA RESPIMAT) 2.5 MCG/ACT AERS inhaler Inhale 2 puffs into the lungs daily 10/19/20   Killian Gutierres MD   venlafaxine (EFFEXOR XR) 75 MG extended release capsule Take 1 capsule by mouth daily Take with food 10/14/20   Killian Gutierres MD   naloxone 4 MG/0.1ML LIQD nasal spray 1 spray by Nasal route as needed for Opioid Reversal Due to COPD and sleep apnea, this is a big recommendation for you 9/30/20   Killian Gutierres MD   blood glucose monitor strips Test 3 times a day & as needed for symptoms of irregular blood glucose. Dispense sufficient amount for indicated testing frequency plus additional to accommodate PRN testing needs. One touch Ultra blue 9/30/20   Cuba Hui MD   Lancets [facility] Use to check blood sugar three times daily along with when necessary due to symptoms. 9/30/20   Cuba Hui MD   vitamin B-12 (CYANOCOBALAMIN) 500 MCG tablet Take 1 tablet by mouth daily 7/13/20   Killian Gutierres MD   Oxygen Tubing MISC by Does not apply route DX COPD. chronic respiratory failure 3/26/20   Killian Gutierres MD   Respiratory Therapy Supplies (NEBULIZER/TUBING/MOUTHPIECE) KIT Dx COPD needs nebulizer supplies 2/20/20   Killian Gutierres MD   ONE TOUCH ULTRASOFT LANCETS [facility] Patient to test blood sugar up to 4 times daily. 11/7/19   Cuba Hui MD   Lancet Devices (LANCING DEVICE) MISC Provide patient with lancing device appropriate for his machine/lancing needles.  6/4/19   Killian Gutierres MD   Lidocaine 4 % LOTN Apply topically    Stewart Henriquez MD   Spacer/Aero Chamber Mouthpiece MISC 1 each by Does not apply route once as needed (to be used with his inhalers) 4/15/19 8/13/21  Killian Gutierres MD   metolazone (ZAROXOLYN) 2.5 MG tablet Take 2.5 mg by mouth three times a week MWF    Historical Provider, MD   Handicap Placard MISC by Does not apply route Can't walk greater than 200 feet. Expires in 5 years. 19   Paige Bryant MD   fluticasone (CUTIVATE) 0.05 % cream Apply topically 2 times daily  17   Historical Provider, MD   fluticasone (FLONASE) 50 MCG/ACT nasal spray 2 sprays by Nasal route daily  Patient taking differently: 2 sprays by Nasal route daily as needed (sinus symptoms)  17   Isabel Olivera MD   Melatonin 10 MG TABS Take 10 mg by mouth nightly as needed (insomnia) 16   Isabel Olivera MD   aspirin 81 MG EC tablet Take 81 mg by mouth daily. Historical Provider, MD        Allergies:     Levofloxacin, Lorazepam, Nsaids, Prozac [fluoxetine hcl], and Vancomycin    Social History:     Tobacco:    reports that he quit smoking about 10 months ago. His smoking use included cigarettes. He started smoking about 36 years ago. He has a 8.25 pack-year smoking history. He quit smokeless tobacco use about 26 years ago. His smokeless tobacco use included snuff. Alcohol:      reports no history of alcohol use. Drug Use:  reports previous drug use. Drug: Marijuana. Family History:     Family History   Problem Relation Age of Onset    Heart Disease Mother          age 64 from MI   Ary Marcelino High Blood Pressure Mother     Diabetes Mother     High Blood Pressure Father          age 80 from CKD and Lung Fibrosis    Kidney Disease Father     Heart Disease Sister     Heart Attack Sister     Obesity Sister     Diabetes Sister        Review of Systems:     Positive and Negative as described in HPI. Review of Systems   Constitutional: Positive for diaphoresis. Negative for fever. HENT: Positive for hearing loss (chronic). Negative for sore throat and trouble swallowing. Respiratory: Positive for shortness of breath (chronic on home O2 4L). Negative for cough.     Cardiovascular: Positive for chest pain and leg swelling (chronic). Negative for palpitations. Gastrointestinal: Positive for nausea. Negative for abdominal pain, constipation, diarrhea and vomiting. Genitourinary: Negative for dysuria and hematuria. Musculoskeletal: Positive for back pain (chronic). Neurological: Positive for numbness (chronic to legs and feet). Negative for dizziness, speech difficulty and headaches. All other systems reviewed and are negative. Physical Exam:   BP (!) 119/55   Pulse 70   Temp 98.4 °F (36.9 °C) (Oral)   Resp 14   Ht 6' (1.829 m)   Wt (!) 418 lb (189.6 kg)   SpO2 99%   BMI 56.69 kg/m²   Temp (24hrs), Av.4 °F (36.9 °C), Min:98.4 °F (36.9 °C), Max:98.4 °F (36.9 °C)    Recent Labs     21  0941   POCGLU 110       Intake/Output Summary (Last 24 hours) at 2021 1634  Last data filed at 2021 1425  Gross per 24 hour   Intake    Output 1350 ml   Net -1350 ml       Physical Exam  Vitals and nursing note reviewed. Exam conducted with a chaperone present. Constitutional:       General: He is in acute distress (chest pressure). Appearance: He is obese. He is ill-appearing (chronically). He is not toxic-appearing. HENT:      Head: Normocephalic. Mouth/Throat:      Mouth: Mucous membranes are dry. Pharynx: No oropharyngeal exudate. Comments: Low hanging soft palate, large tongue   Eyes:      General: No scleral icterus. Extraocular Movements: Extraocular movements intact. Conjunctiva/sclera: Conjunctivae normal.      Pupils: Pupils are equal, round, and reactive to light. Neck:      Comments: Very short thick neck  Cardiovascular:      Rate and Rhythm: Normal rate and regular rhythm. Pulses: Normal pulses.       Comments: Heart tones distant  Pulmonary:      Effort: Pulmonary effort is normal.      Comments: Diminished throughout, clear, no use of accessory muscles or conversational dyspnea  Abdominal:      General: There is distension. Tenderness: There is no abdominal tenderness. Comments: Morbidly obese, difficult to eval.  Superficial break down mid abdoment due to insulin injections per pt   Musculoskeletal:         General: No swelling or tenderness. Skin:     General: Skin is warm and dry. Capillary Refill: Capillary refill takes less than 2 seconds. Coloration: Skin is not jaundiced. Comments: Chronic stasis changes to lower legs with hypertrophic skin changes, no edema or open wounds   Neurological:      General: No focal deficit present. Cranial Nerves: No cranial nerve deficit.    Psychiatric:         Behavior: Behavior normal.         Investigations:      Laboratory Testing:  Recent Results (from the past 24 hour(s))   EKG 12 Lead    Collection Time: 09/11/21 12:06 AM   Result Value Ref Range    Ventricular Rate 75 BPM    Atrial Rate 75 BPM    P-R Interval 154 ms    QRS Duration 170 ms    Q-T Interval 442 ms    QTc Calculation (Bazett) 493 ms    P Axis 78 degrees    R Axis 5 degrees    T Axis 103 degrees   CBC Auto Differential    Collection Time: 09/11/21 12:41 AM   Result Value Ref Range    WBC 16.3 (H) 3.5 - 11.3 k/uL    RBC 3.99 (L) 4.21 - 5.77 m/uL    Hemoglobin 10.6 (L) 13.0 - 17.0 g/dL    Hematocrit 35.6 (L) 40.7 - 50.3 %    MCV 89.2 82.6 - 102.9 fL    MCH 26.6 25.2 - 33.5 pg    MCHC 29.8 28.4 - 34.8 g/dL    RDW 15.3 (H) 11.8 - 14.4 %    Platelets 223 525 - 782 k/uL    MPV 10.7 8.1 - 13.5 fL    NRBC Automated 0.0 0.0 per 100 WBC    Differential Type NOT REPORTED     Seg Neutrophils 83 (H) 36 - 65 %    Lymphocytes 7 (L) 24 - 43 %    Monocytes 6 3 - 12 %    Eosinophils % 2 1 - 4 %    Basophils 1 0 - 2 %    Immature Granulocytes 1 (H) 0 %    Segs Absolute 13.41 (H) 1.50 - 8.10 k/uL    Absolute Lymph # 1.20 1.10 - 3.70 k/uL    Absolute Mono # 0.98 0.10 - 1.20 k/uL    Absolute Eos # 0.33 0.00 - 0.44 k/uL    Basophils Absolute 0.12 0.00 - 0.20 k/uL    Absolute Immature Granulocyte 0.21 0.00 - 0.30 k/uL    WBC Morphology NOT REPORTED     RBC Morphology ANISOCYTOSIS PRESENT     Platelet Estimate NOT REPORTED    Basic Metabolic Panel w/ Reflex to MG    Collection Time: 09/11/21 12:41 AM   Result Value Ref Range    Glucose 157 (H) 70 - 99 mg/dL    BUN 68 (H) 6 - 20 mg/dL    CREATININE 2.23 (H) 0.70 - 1.20 mg/dL    Bun/Cre Ratio NOT REPORTED 9 - 20    Calcium 9.5 8.6 - 10.4 mg/dL    Sodium 137 135 - 144 mmol/L    Potassium 4.5 3.7 - 5.3 mmol/L    Chloride 97 (L) 98 - 107 mmol/L    CO2 25 20 - 31 mmol/L    Anion Gap 15 9 - 17 mmol/L    GFR Non-African American 31 (L) >60 mL/min    GFR  37 (L) >60 mL/min    GFR Comment          GFR Staging NOT REPORTED    Troponin    Collection Time: 09/11/21 12:41 AM   Result Value Ref Range    Troponin, High Sensitivity 45 (H) 0 - 22 ng/L    Troponin T NOT REPORTED <0.03 ng/mL    Troponin Interp NOT REPORTED    Brain Natriuretic Peptide    Collection Time: 09/11/21 12:41 AM   Result Value Ref Range    Pro- <300 pg/mL    BNP Interpretation Pro-BNP Reference Range:    Troponin    Collection Time: 09/11/21  1:47 AM   Result Value Ref Range    Troponin, High Sensitivity 48 (H) 0 - 22 ng/L    Troponin T NOT REPORTED <0.03 ng/mL    Troponin Interp NOT REPORTED    Troponin    Collection Time: 09/11/21  3:47 AM   Result Value Ref Range    Troponin, High Sensitivity 43 (H) 0 - 22 ng/L    Troponin T NOT REPORTED <0.03 ng/mL    Troponin Interp NOT REPORTED    POC Glucose Fingerstick    Collection Time: 09/11/21  9:41 AM   Result Value Ref Range    POC Glucose 110 75 - 110 mg/dL   CBC    Collection Time: 09/11/21 10:45 AM   Result Value Ref Range    WBC 12.7 (H) 3.5 - 11.3 k/uL    RBC 3.99 (L) 4.21 - 5.77 m/uL    Hemoglobin 10.5 (L) 13.0 - 17.0 g/dL    Hematocrit 34.7 (L) 40.7 - 50.3 %    MCV 87.0 82.6 - 102.9 fL    MCH 26.3 25.2 - 33.5 pg    MCHC 30.3 28.4 - 34.8 g/dL    RDW 15.3 (H) 11.8 - 14.4 %    Platelets 358 896 - 047 k/uL    MPV 10.3 8.1 - 13.5 fL    NRBC Automated 0.0 0.0 per 100 WBC   APTT    Collection Time: 09/11/21 10:45 AM   Result Value Ref Range    PTT 23.0 20.5 - 30.5 sec   Troponin    Collection Time: 09/11/21 10:58 AM   Result Value Ref Range    Troponin, High Sensitivity 39 (H) 0 - 22 ng/L    Troponin T NOT REPORTED <0.03 ng/mL    Troponin Interp NOT REPORTED    Troponin    Collection Time: 09/11/21  2:03 PM   Result Value Ref Range    Troponin, High Sensitivity 40 (H) 0 - 22 ng/L    Troponin T NOT REPORTED <0.03 ng/mL    Troponin Interp NOT REPORTED        Imaging/Diagnostics:    CT CHEST WO CONTRAST  Result Date: 9/11/2021  The IV was lost and contrast could not be given. Dissection therefore cannot be excluded. This noncontrast exam is normal for age. Findings discussed with the ordering physician 09/11/2021 at 0321 hours. XR CHEST PORTABLE  Result Date: 9/11/2021  Right basilar opacity may represent airspace disease or superimposed shadows on this portable study obtained on an obese patient. When the patient is able, a follow-up PA and lateral examination of the chest would be helpful.        Assessment :      Hospital Problems         Last Modified POA    * (Principal) Unstable angina ([address] 9/11/2021 Yes    CAD (coronary artery disease) 9/11/2021 Yes    Chronic obstructive pulmonary disease ([address] 9/11/2021 Yes    Type 2 diabetes mellitus with diabetic polyneuropathy, with long-term current use of insulin ([address] 9/11/2021 Yes    CKD (chronic kidney disease) stage 3, GFR 30-59 ml/min ([address] 9/11/2021 Yes    Essential hypertension 9/11/2021 Yes    Morbid obesity with BMI of 50.0-59.9, adult ([address] 9/11/2021 Yes    Chronic back pain greater than 3 months duration 9/11/2021 Yes    Gastroesophageal reflux disease without esophagitis 9/11/2021 Yes    BARBY treated with BiPAP 9/11/2021 Yes    Bilateral hearing loss 9/11/2021 Yes    Chronic respiratory failure with hypoxia ([address] 9/11/2021 Yes    Venous insufficiency of both lower extremities 9/11/2021 Yes Chronic gout due to renal impairment without tophus 9/11/2021 Yes    Overview Signed 9/11/2021  4:27 PM by NICOLE Tomlinson     8/30/21 Uric acid 11.5               Plan:     Patient status inpatient in the Progressive Unit/Step down    Unstable angina with history of coronary artery disease and stenting  -Consult cardiology  -Nitro and heparin drip  -N.p.o. after midnight 9/12 for cardiac cath on 9/13    COPD with chronic hypoxic respiratory failure on 4 L home O2  -Supplemental oxygen  -Symbicort twice daily, albuterol as needed    BARBY patient compliant with BiPAP  -Home CPAP    CKD with LIZETH, chronic gout due to renal impairment  -Decrease Bumex to daily, hold lisinopril and Aldactone  -CMP in the a.m. Obesity  -Healthy lifestyle choices    Type 2 diabetes mellitus  -Sliding scale insulin before meals and at bedtime    GERD  -Protonix daily    Venous insufficiency of both lower legs  -Monitor    Reviewed CODE STATUS with patient and daughter he would like to be a DNR CCA. Consultations:   IP CONSULT TO CARDIOLOGY  IP CONSULT TO HOSPITALIST  IP CONSULT TO FAMILY MEDICINE  IP CONSULT TO CARDIOLOGY    Patient is admitted as inpatient status because of co-morbidities listed above, severity of signs and symptoms as outlined, requirement for current medical therapies and most importantly because of direct risk to patient if care not provided in a hospital setting. Expected length of stay > 48 hours.     NICOLE Tomlinson  9/11/2021  4:34 PM    Copy sent to Dr. Walker Tuttle MD

## 2021-09-12 ENCOUNTER — APPOINTMENT (OUTPATIENT)
Dept: ULTRASOUND IMAGING | Age: 56
DRG: 286 | End: 2021-09-12
Payer: COMMERCIAL

## 2021-09-12 PROBLEM — D64.9 ANEMIA, NORMOCYTIC NORMOCHROMIC: Status: ACTIVE | Noted: 2021-09-12

## 2021-09-12 LAB
ALBUMIN SERPL-MCNC: 4.1 G/DL (ref 3.5–5.2)
ALBUMIN/GLOBULIN RATIO: 1.1 (ref 1–2.5)
ALP BLD-CCNC: 80 U/L (ref 40–129)
ALT SERPL-CCNC: 11 U/L (ref 5–41)
ANION GAP SERPL CALCULATED.3IONS-SCNC: 13 MMOL/L (ref 9–17)
AST SERPL-CCNC: 16 U/L
BILIRUB SERPL-MCNC: 0.55 MG/DL (ref 0.3–1.2)
BILIRUBIN URINE: NEGATIVE
BNP INTERPRETATION: NORMAL
BUN BLDV-MCNC: 70 MG/DL (ref 6–20)
BUN/CREAT BLD: ABNORMAL (ref 9–20)
CALCIUM SERPL-MCNC: 9.5 MG/DL (ref 8.6–10.4)
CHLORIDE BLD-SCNC: 96 MMOL/L (ref 98–107)
CHLORIDE, UR: 73 MMOL/L
CHOLESTEROL/HDL RATIO: 4.6
CHOLESTEROL: 137 MG/DL
CHP ED QC CHECK: YES
CO2: 26 MMOL/L (ref 20–31)
COLOR: YELLOW
COMMENT UA: NORMAL
COMPLEMENT C3: 170 MG/DL (ref 90–180)
COMPLEMENT C4: 31 MG/DL (ref 10–40)
CREAT SERPL-MCNC: 1.88 MG/DL (ref 0.7–1.2)
EOSINOPHIL,URINE: NORMAL
FREE KAPPA/LAMBDA RATIO: 1.04 (ref 0.26–1.65)
GFR AFRICAN AMERICAN: 45 ML/MIN
GFR NON-AFRICAN AMERICAN: 37 ML/MIN
GFR SERPL CREATININE-BSD FRML MDRD: ABNORMAL ML/MIN/{1.73_M2}
GFR SERPL CREATININE-BSD FRML MDRD: ABNORMAL ML/MIN/{1.73_M2}
GLUCOSE BLD-MCNC: 227 MG/DL (ref 75–110)
GLUCOSE BLD-MCNC: 236 MG/DL (ref 70–99)
GLUCOSE BLD-MCNC: 239 MG/DL (ref 75–110)
GLUCOSE BLD-MCNC: 247 MG/DL
GLUCOSE URINE: NEGATIVE
HAV IGM SER IA-ACNC: NONREACTIVE
HDLC SERPL-MCNC: 30 MG/DL
HEPATITIS B CORE IGM ANTIBODY: NONREACTIVE
HEPATITIS B SURFACE ANTIGEN: NONREACTIVE
HEPATITIS C ANTIBODY: NONREACTIVE
KAPPA FREE LIGHT CHAINS QNT: 3.08 MG/DL (ref 0.37–1.94)
KETONES, URINE: NEGATIVE
LAMBDA FREE LIGHT CHAINS QNT: 2.97 MG/DL (ref 0.57–2.63)
LDL CHOLESTEROL DIRECT: 60 MG/DL
LDL CHOLESTEROL: ABNORMAL MG/DL (ref 0–130)
LEUKOCYTE ESTERASE, URINE: NEGATIVE
MAGNESIUM: 2.3 MG/DL (ref 1.6–2.6)
NITRITE, URINE: NEGATIVE
PARTIAL THROMBOPLASTIN TIME: 24.3 SEC (ref 20.5–30.5)
PARTIAL THROMBOPLASTIN TIME: 27.2 SEC (ref 20.5–30.5)
PARTIAL THROMBOPLASTIN TIME: 37.5 SEC (ref 20.5–30.5)
PARTIAL THROMBOPLASTIN TIME: 47.4 SEC (ref 20.5–30.5)
PARTIAL THROMBOPLASTIN TIME: 80.8 SEC (ref 20.5–30.5)
PH UA: 6 (ref 5–8)
POTASSIUM SERPL-SCNC: 4.7 MMOL/L (ref 3.7–5.3)
PRO-BNP: 93 PG/ML
PROTEIN UA: NEGATIVE
SODIUM BLD-SCNC: 135 MMOL/L (ref 135–144)
SODIUM,UR: 84 MMOL/L
SPECIFIC GRAVITY UA: 1.01 (ref 1–1.03)
TOTAL PROTEIN, URINE: <4 MG/DL
TOTAL PROTEIN: 7.8 G/DL (ref 6.4–8.3)
TRIGL SERPL-MCNC: 416 MG/DL
TROPONIN INTERP: ABNORMAL
TROPONIN INTERP: ABNORMAL
TROPONIN T: ABNORMAL NG/ML
TROPONIN T: ABNORMAL NG/ML
TROPONIN, HIGH SENSITIVITY: 34 NG/L (ref 0–22)
TROPONIN, HIGH SENSITIVITY: 35 NG/L (ref 0–22)
TURBIDITY: CLEAR
URINE HGB: NEGATIVE
UROBILINOGEN, URINE: NORMAL
VLDLC SERPL CALC-MCNC: ABNORMAL MG/DL (ref 1–30)

## 2021-09-12 PROCEDURE — 99222 1ST HOSP IP/OBS MODERATE 55: CPT | Performed by: INTERNAL MEDICINE

## 2021-09-12 PROCEDURE — 83735 ASSAY OF MAGNESIUM: CPT

## 2021-09-12 PROCEDURE — 2580000003 HC RX 258: Performed by: NURSE PRACTITIONER

## 2021-09-12 PROCEDURE — 86225 DNA ANTIBODY NATIVE: CPT

## 2021-09-12 PROCEDURE — 76770 US EXAM ABDO BACK WALL COMP: CPT

## 2021-09-12 PROCEDURE — 87205 SMEAR GRAM STAIN: CPT

## 2021-09-12 PROCEDURE — 82947 ASSAY GLUCOSE BLOOD QUANT: CPT

## 2021-09-12 PROCEDURE — 94761 N-INVAS EAR/PLS OXIMETRY MLT: CPT

## 2021-09-12 PROCEDURE — 84156 ASSAY OF PROTEIN URINE: CPT

## 2021-09-12 PROCEDURE — 6370000000 HC RX 637 (ALT 250 FOR IP): Performed by: CLINICAL NURSE SPECIALIST

## 2021-09-12 PROCEDURE — 83880 ASSAY OF NATRIURETIC PEPTIDE: CPT

## 2021-09-12 PROCEDURE — 80053 COMPREHEN METABOLIC PANEL: CPT

## 2021-09-12 PROCEDURE — 6370000000 HC RX 637 (ALT 250 FOR IP): Performed by: INTERNAL MEDICINE

## 2021-09-12 PROCEDURE — 1200000000 HC SEMI PRIVATE

## 2021-09-12 PROCEDURE — 84165 PROTEIN E-PHORESIS SERUM: CPT

## 2021-09-12 PROCEDURE — 84155 ASSAY OF PROTEIN SERUM: CPT

## 2021-09-12 PROCEDURE — 86160 COMPLEMENT ANTIGEN: CPT

## 2021-09-12 PROCEDURE — 81003 URINALYSIS AUTO W/O SCOPE: CPT

## 2021-09-12 PROCEDURE — 84300 ASSAY OF URINE SODIUM: CPT

## 2021-09-12 PROCEDURE — 80074 ACUTE HEPATITIS PANEL: CPT

## 2021-09-12 PROCEDURE — 82436 ASSAY OF URINE CHLORIDE: CPT

## 2021-09-12 PROCEDURE — 83721 ASSAY OF BLOOD LIPOPROTEIN: CPT

## 2021-09-12 PROCEDURE — 85730 THROMBOPLASTIN TIME PARTIAL: CPT

## 2021-09-12 PROCEDURE — 6360000002 HC RX W HCPCS: Performed by: CLINICAL NURSE SPECIALIST

## 2021-09-12 PROCEDURE — 99232 SBSQ HOSP IP/OBS MODERATE 35: CPT | Performed by: INTERNAL MEDICINE

## 2021-09-12 PROCEDURE — 83516 IMMUNOASSAY NONANTIBODY: CPT

## 2021-09-12 PROCEDURE — 86038 ANTINUCLEAR ANTIBODIES: CPT

## 2021-09-12 PROCEDURE — 6370000000 HC RX 637 (ALT 250 FOR IP): Performed by: NURSE PRACTITIONER

## 2021-09-12 PROCEDURE — 83883 ASSAY NEPHELOMETRY NOT SPEC: CPT

## 2021-09-12 PROCEDURE — 84166 PROTEIN E-PHORESIS/URINE/CSF: CPT

## 2021-09-12 PROCEDURE — 6360000002 HC RX W HCPCS: Performed by: STUDENT IN AN ORGANIZED HEALTH CARE EDUCATION/TRAINING PROGRAM

## 2021-09-12 PROCEDURE — 94640 AIRWAY INHALATION TREATMENT: CPT

## 2021-09-12 PROCEDURE — 86334 IMMUNOFIX E-PHORESIS SERUM: CPT

## 2021-09-12 PROCEDURE — 2500000003 HC RX 250 WO HCPCS: Performed by: STUDENT IN AN ORGANIZED HEALTH CARE EDUCATION/TRAINING PROGRAM

## 2021-09-12 PROCEDURE — 93005 ELECTROCARDIOGRAM TRACING: CPT | Performed by: NURSE PRACTITIONER

## 2021-09-12 PROCEDURE — 80061 LIPID PANEL: CPT

## 2021-09-12 PROCEDURE — 84484 ASSAY OF TROPONIN QUANT: CPT

## 2021-09-12 RX ORDER — SODIUM CHLORIDE 9 MG/ML
INJECTION, SOLUTION INTRAVENOUS CONTINUOUS
Status: DISCONTINUED | OUTPATIENT
Start: 2021-09-13 | End: 2021-09-14 | Stop reason: HOSPADM

## 2021-09-12 RX ORDER — ACETYLCYSTEINE 200 MG/ML
1200 SOLUTION ORAL; RESPIRATORY (INHALATION) 2 TIMES DAILY
Status: DISCONTINUED | OUTPATIENT
Start: 2021-09-13 | End: 2021-09-14 | Stop reason: HOSPADM

## 2021-09-12 RX ADMIN — HEPARIN SODIUM AND DEXTROSE 7 UNITS/KG/HR: 10000; 5 INJECTION INTRAVENOUS at 04:23

## 2021-09-12 RX ADMIN — NITROGLYCERIN 15 MCG/MIN: 20 INJECTION INTRAVENOUS at 06:54

## 2021-09-12 RX ADMIN — ESCITALOPRAM OXALATE 10 MG: 10 TABLET ORAL at 09:29

## 2021-09-12 RX ADMIN — BUDESONIDE AND FORMOTEROL FUMARATE DIHYDRATE 2 PUFF: 160; 4.5 AEROSOL RESPIRATORY (INHALATION) at 19:54

## 2021-09-12 RX ADMIN — GABAPENTIN 300 MG: 300 CAPSULE ORAL at 21:04

## 2021-09-12 RX ADMIN — ALLOPURINOL 100 MG: 100 TABLET ORAL at 09:29

## 2021-09-12 RX ADMIN — CLOPIDOGREL 75 MG: 75 TABLET, FILM COATED ORAL at 09:34

## 2021-09-12 RX ADMIN — MORPHINE SULFATE 4 MG: 4 INJECTION, SOLUTION INTRAMUSCULAR; INTRAVENOUS at 02:07

## 2021-09-12 RX ADMIN — METOPROLOL TARTRATE 50 MG: 50 TABLET, FILM COATED ORAL at 09:29

## 2021-09-12 RX ADMIN — PANTOPRAZOLE SODIUM 40 MG: 40 TABLET, DELAYED RELEASE ORAL at 06:52

## 2021-09-12 RX ADMIN — HEPARIN SODIUM 2000 UNITS: 1000 INJECTION INTRAVENOUS; SUBCUTANEOUS at 17:35

## 2021-09-12 RX ADMIN — MAGNESIUM GLUCONATE 500 MG ORAL TABLET 400 MG: 500 TABLET ORAL at 09:29

## 2021-09-12 RX ADMIN — AMITRIPTYLINE HYDROCHLORIDE 100 MG: 50 TABLET, FILM COATED ORAL at 21:03

## 2021-09-12 RX ADMIN — LATANOPROST 1 DROP: 50 SOLUTION OPHTHALMIC at 21:04

## 2021-09-12 RX ADMIN — Medication 1000 UNITS: at 09:28

## 2021-09-12 RX ADMIN — METOPROLOL TARTRATE 50 MG: 50 TABLET, FILM COATED ORAL at 21:03

## 2021-09-12 RX ADMIN — SODIUM CHLORIDE, PRESERVATIVE FREE 10 ML: 5 INJECTION INTRAVENOUS at 21:04

## 2021-09-12 RX ADMIN — MORPHINE SULFATE 2 MG: 4 INJECTION INTRAVENOUS at 22:08

## 2021-09-12 RX ADMIN — HEPARIN SODIUM 4000 UNITS: 1000 INJECTION INTRAVENOUS; SUBCUTANEOUS at 04:23

## 2021-09-12 RX ADMIN — FERROUS SULFATE TAB EC 325 MG (65 MG FE EQUIVALENT) 325 MG: 325 (65 FE) TABLET DELAYED RESPONSE at 09:29

## 2021-09-12 RX ADMIN — MORPHINE SULFATE 4 MG: 4 INJECTION, SOLUTION INTRAMUSCULAR; INTRAVENOUS at 14:23

## 2021-09-12 RX ADMIN — BUMETANIDE 3 MG: 1 TABLET ORAL at 09:29

## 2021-09-12 RX ADMIN — HEPARIN SODIUM 4000 UNITS: 1000 INJECTION INTRAVENOUS; SUBCUTANEOUS at 11:17

## 2021-09-12 RX ADMIN — ROSUVASTATIN CALCIUM 10 MG: 10 TABLET, COATED ORAL at 21:04

## 2021-09-12 RX ADMIN — Medication 500 MCG: at 09:28

## 2021-09-12 RX ADMIN — BUDESONIDE AND FORMOTEROL FUMARATE DIHYDRATE 2 PUFF: 160; 4.5 AEROSOL RESPIRATORY (INHALATION) at 08:39

## 2021-09-12 RX ADMIN — VENLAFAXINE HYDROCHLORIDE 75 MG: 75 CAPSULE, EXTENDED RELEASE ORAL at 09:28

## 2021-09-12 RX ADMIN — INSULIN LISPRO 4 UNITS: 100 INJECTION, SOLUTION INTRAVENOUS; SUBCUTANEOUS at 19:32

## 2021-09-12 RX ADMIN — Medication 81 MG: at 09:35

## 2021-09-12 RX ADMIN — HEPARIN SODIUM 2000 UNITS: 1000 INJECTION INTRAVENOUS; SUBCUTANEOUS at 22:51

## 2021-09-12 ASSESSMENT — ENCOUNTER SYMPTOMS
VOMITING: 0
SHORTNESS OF BREATH: 1
NAUSEA: 0
COUGH: 0

## 2021-09-12 ASSESSMENT — PAIN SCALES - GENERAL
PAINLEVEL_OUTOF10: 8
PAINLEVEL_OUTOF10: 7

## 2021-09-12 NOTE — ED NOTES
Repeat aPTT drawn, labeled and sent to lab  Pt. Resting on stretcher, eyes open, RR even and non-labored  Pt. Updated on POC  Will continue to monitor   Pt.  Denies any needs at this time      Alex Marley RN  09/12/21 [id number]

## 2021-09-12 NOTE — ED NOTES
Pt. Resting on stretcher, eyes open, RR even and non-labored  Pt. Updated on POC  Will continue to monitor   Pt.  Requesting pain medications      Krysten Bae RN  09/12/21 [id number]

## 2021-09-12 NOTE — ED NOTES
Pt. Placed into hospital bed for comfort  Linens changed   Pt. Educated on bed controls   Pt.  Denies any needs at this time      Indigo Seay RN  09/12/21 [phone]

## 2021-09-12 NOTE — ED NOTES
Pt. Resting on stretcher, eyes open, RR even and non-labored  Pt.  Updated on POC  Will continue to monitor        Alex Marley RN  09/12/21 [id number]

## 2021-09-12 NOTE — PROGRESS NOTES
[facility]  Progress Note                   Date:   9/12/2021  Patient name: Oneida Cortes. Date of admission:  9/11/2021 12:01 AM  MRN:   [id number]  YOB: 1965  PCP: Isela Wayne MD    Reason for Admission: Chest pain [R07.9]    Subjective:       Clinical Changes /Abnormalities: NO acute CV issues/concerns overnight. Labs, vitals, & tele reviewed.      Review of Systems    Medications:   Scheduled Meds:   [Held by provider] metOLazone  2.5 mg Oral Once per day on Mon Wed Fri    vitamin B-12  500 mcg Oral Daily    venlafaxine  75 mg Oral Daily    tiotropium  2 puff Inhalation Daily    [Held by provider] lisinopril  5 mg Oral Daily    metoprolol tartrate  50 mg Oral BID    magnesium oxide  400 mg Oral Daily    ferrous sulfate  325 mg Oral Daily    [Held by provider] spironolactone  50 mg Oral Daily    rosuvastatin  10 mg Oral Nightly    escitalopram  10 mg Oral Daily    pantoprazole  40 mg Oral QAM AC    clopidogrel  75 mg Oral Daily    latanoprost  1 drop Both Eyes Nightly    docusate sodium  100 mg Oral BID    Vitamin D  1,000 Units Oral Daily    allopurinol  100 mg Oral Daily    amitriptyline  100 mg Oral Nightly    gabapentin  300 mg Oral BID    sodium chloride flush  5-40 mL IntraVENous 2 times per day    bumetanide  3 mg Oral Daily    insulin lispro  0-12 Units SubCUTAneous TID WC    insulin lispro  0-6 Units SubCUTAneous Nightly    budesonide-formoterol  2 puff Inhalation BID    aspirin  81 mg Oral Daily     Continuous Infusions:   sodium chloride      nitroGLYCERIN 15 mcg/min (09/12/21 0654)    heparin (PORCINE) Infusion 7 Units/kg/hr (09/12/21 0423)     CBC:   Recent Labs     09/11/21 0041 09/11/21  1045   WBC 16.3* 12.7*   HGB 10.6* 10.5*    266     BMP:    Recent Labs     09/11/21  0041 09/12/21  1050     --    K 4.5  --    CL 97*  --    CO2 25  --    BUN 68*  --    CREATININE 2.23*  --    GLUCOSE 157* 247 Hepatic:No results for input(s): AST, ALT, ALB, BILITOT, ALKPHOS in the last 72 hours. Troponin:   Recent Labs     09/11/21  0347 09/11/21  1058 09/11/21  1403   TROPHS 43* 39* 40*     BNP: No results for input(s): BNP in the last 72 hours. Lipids: No results for input(s): CHOL, HDL in the last 72 hours. Invalid input(s): LDLCALCU  INR: No results for input(s): INR in the last 72 hours. Objective:   Vitals: BP (!) 141/117   Pulse 77   Temp 98.4 °F (36.9 °C) (Oral)   Resp 13   Ht 6' (1.829 m)   Wt (!) 418 lb (189.6 kg)   SpO2 93%   BMI 56.69 kg/m²   General appearance: alert and cooperative with exam  HEENT: Head: Normocephalic, no lesions, without obvious abnormality. Neck:no JVD, trachea midline, no adenopathy  Lungs: Clear to auscultation, NC oxygen  Heart: Regular rate and rhythm, s1/s2 auscultated, no murmurs, SR  Abdomen: soft, non-tender, bowel sounds active, obese  Extremities: generalized edema  Neurologic: not done    ECHO 3/2021: Contrast was utilized on this technically difficult study. Left ventricle is normal in size. Mild left ventricular hypertrophy. Global left ventricular systolic function is normal. Estimated LV EF 50-55  %. No significant valvular regurgitation or stenosis seen.     STRESS 2018: Findings concerning   for reversible ischemia of the mid and anterior inferior wall as well as the   apical lateral wall. 2. Left ventricular ejection fraction of 53%. 3.  Please see report for EKG portion of the examination which will be   performed separately by physician from cardiology. Risk stratification:  High risk     Cardiac Cath 4/2018  Angiographic Findings      Cardiac Arteries and Lesion Findings     LMCA: Normal 0% stenosis.     LAD: Normal 0% stenosis.     LCx: Normal 0% stenosis. dominant  Patent OM stent     RCA: Normal 0% stenosis.      Coronary Tree      Dominance: Left     LV Analysis  LV function assessed as:Normal.  Ejection Fraction  +----------------------------------------------------------------------+---+  ! Method                                                                ! EF%! +----------------------------------------------------------------------+---+  ! LV gram                                                               !50 !  +----------------------------------------------------------------------+---+  Procedure Summary      Patent OM stent   No new lesions   Overall preserved LV function      Recommendations      Medical treatment   Risk factors modification    Assessment / Acute Cardiac Problems:   1. Unstable Angina  2. New LBBB as compared to prior ECG  3. CAD with hx of PCI/stent OM1 in 2016  4. Abnormal stress test March 2021  5. LIZETH on CKD3  6. HTN  7. DM2  8. Chronic resp failure with COPD on home oxygen  9. Morbid obesity  10. BARBY on CPAP  11. Chronic gout  12.  Depression with anxiety    Patient Active Problem List:     DDD (degenerative disc disease), lumbar     Otitis externa     Hypertriglyceridemia     LIZETH (acute kidney injury) ([facility] [address]     CKD (chronic kidney disease) stage 3, GFR 30-59 ml/min ([facility])     CAD (coronary artery disease)     Chronic obstructive pulmonary disease (HCC)     Type 2 diabetes mellitus with diabetic polyneuropathy, with long-term current use of insulin ([facility])     Chronic pancreatitis ([address]     Essential hypertension     Displacement of lumbar intervertebral disc without myelopathy     Chronic diastolic congestive heart failure (HCC)     Insomnia     BPH (benign prostatic hyperplasia)     Morbid obesity with BMI of 50.0-59.9, adult ([address]     Lower abdominal pain     Hepatic steatosis     History of rib fracture     Umbilical hernia     Adrenal gland anomaly     Spinal stenosis of lumbar region without neurogenic claudication     Chronic back pain greater than 3 months duration     Gastroesophageal reflux disease without esophagitis     Hyperlipidemia with target LDL less than 70     Lower urinary tract symptoms (LUTS)     Vitamin D deficiency     Iron deficiency anemia due to chronic blood loss     BARBY treated with BiPAP     Chronic midline low back pain with left-sided sciatica     Stasis dermatitis of both legs     Anxiety     Intertrigo axillary b/l     History of recurrent ear infection     Anemia     Mixed conductive and sensorineural hearing loss of both ears     Tinnitus of both ears     Slow transit constipation     Chronic infection of both external ears     Tinea pedis of both feet     Onychomycosis     Moderate episode of recurrent major depressive disorder (HCC)     Hydrocele, bilateral     Xerosis cutis     Celiac artery stenosis (HCC)     Myopia     Nuclear nonsenile cataract     Presbyopia     Postlaminectomy syndrome of lumbar region     Venous insufficiency of left lower extremity     Bilateral hearing loss     Seizures (HCC)     Recurrent infection of skin     Vitamin B 12 deficiency     Mobility impaired     Chronic respiratory failure with hypoxia (HCC)     Chronic infective otitis externa     Benign hypertensive heart and CKD, stage 3 (GFR 30-59), w CHF (HCC)     Hypomagnesemia     Chronic malignant otitis externa of both ears     Psoriasis     Tinea corporis     Colon cancer screening declined     Excoriation     Lymphedema of both lower extremities     Venous insufficiency of both lower extremities     PVD (peripheral vascular disease) (HCC)     Chronic gout due to renal impairment without tophus     Chest pain     Unstable angina (HCC)     Acute on chronic congestive heart failure (HCC)     Elevated troponin     LBBB (left bundle branch block)      Plan of Treatment:   1. Stable. Appreciate nephrology input/clearance for cardiac cath in AM  2. Keep NPO after midnight for cath tomorrow  3. Continue IV Heparin & NTG gtt. Continue PO ASA, Plavix, BB, statin.  Aldactone, Zaroxolyn, & ACE on hold d/t LIZETH     Electronically signed by NICOLE Murray CNP on 9/12/2021 at 10:55 AM  [address]  [phone]

## 2021-09-12 NOTE — ED NOTES
The following labs labeled with pt sticker and tubed to lab:     [] Blue     [] Puja Naheed   [] on ice  [x] Green/yellow (TROP)  [] Green/black [] on ice  [] Yellow  [] Red  [] Pink      [] COVID-19 swab    [] Rapid  [] PCR  [] Peds Viral Panel     [] Urine Sample  [] Pelvic Cultures  [] Blood Cultures          Li Infante, RN  09/12/21 [address], [place]  09/12/21 3452

## 2021-09-12 NOTE — CONSULTS
Nephrology Consult Note    Reason for Consult:  LIZETH/cardiac cath clearance scheduled for   Requesting Physician:  Unique Villasenor DO    Chief Complaint:  Chest Pain    History Obtained From:  patient, electronic medical record    History of Present Illness: This is a 63 y.o. male who presented to the hospital for evaluation of Chest pain. Per patient chest pain started approximately 2 days ago described as midsternal radiating into his left arm and up to the left side of his neck into his jaw. He presented to the emergency department for evaluation. He was admitted for further evaluation and work-up and potential cardiac cath on 2021. We have been consulted for LIZETH/cardiac cath clearance    Renal ultrasound reviewed limited visualization of the left kidney secondary to body habitus, right kidney was grossly unremarkable measuring at 12.5 x 6.7 x 6.8 cm with renal cortical thickness at 1.3 cm, consistent with previous imaging in 2016. Creatinine on admission 2.23 with a patient baseline creatinine seems to be around 1.6-1.9 mg/dl now. Creatinine did improve to 1.88 mg/DL today. Heparin and nitro infusing    On assessment patient is awake and alert sitting up at bedside uncomfortable looking. He is off oxygen at this time states he only wears it as needed during the day but is compliant with CPAP at night. He is morbidly obese, alert and oriented answering questions appropriately. Slightly hypertensive on assessment. Regular rate and rhythm S1-S2 appreciated, lungs clear to auscultation bilaterally A&P. Bowel sounds active all 4 quadrants without tenderness on palpation. Pulses palpable in all extremities. There is trace bilateral lower extremity edema    Patient has a strong family history of renal failure states his father  from complications of chronic kidney disease and he was on hemodialysis.  Pt denies any hx of heavy or prolonged NSAID use that he is aware of but states that over-the-counter extra strength pain reliever unable to state name. There is no history of blood or bone marrow disorders. There is no hx of jaundice or hepatitis or sexually transmitted disease. Pt has no hx of collagen vascular disease or vasculitis. No history of dysuria or frequency. No recent procedures involving IV contrast. There is no hx of paraprotein disease. Pt denies any hx of recurrent UTI , incontinence or recurrent nephrolithiasis. Patient denies poor p.o. intake nor nausea, diarrhea or emesis     Medication review shows use of Bumex 3 mg daily and Zaroxolyn 2.5 mg daily PRN  per his home nephrologist Dr. Herson Angulo but patient states poor follow-up appointments since COVID-19 pandemic.       Past Medical History:        Diagnosis Date    Acute on chronic kidney failure ([address] 7/20/2017    Acute on chronic respiratory failure (HCC) 10/2/2018    Adhesive capsulitis of left shoulder 3/25/2017    Anxiety 10/02/2016    smokes marijuana for this    Arthropathy, unspecified, other specified sites 6/13/2013    Asthma     B12 deficiency     Bilateral lower leg cellulitis 2/17/2016    Blood in stool     CAD (coronary artery disease)     Cellulitis of both lower extremities 5/25/2017    Cellulitis of leg, left 07/20/2017    CHF (congestive heart failure), NYHA class III ([facility]) 8/14/2013    Chronic back pain     Chronic bronchitis ([facility])     Chronic headaches     was referred to neuro, testing scheduled    Chronic respiratory failure ([address]     was on vent    Chronic ulcer of left leg, with fat layer exposed ([address] 02/22/2019    healed    Class 2 severe obesity due to excess calories with serious comorbidity and body mass index (BMI) of 35.0 to 35.9 in adult ([address]     (BMI 35.0-39.9 without comorbidity)    COPD exacerbation ([address] 11/2/2016    Diabetic neuropathy ([address] 8/14/2013    Displacement of lumbar intervertebral disc without myelopathy 6/13/2013    Ear infection     RIGHT    Essential hypertension     Facial cellulitis 2012    Fall 3/25/2017    GERD (gastroesophageal reflux disease)     Head injury     Hearing loss in right ear     pencil pierced ear as a child    Hepatic steatosis 12/3/2015    History of general anesthesia complication     has woke up during surgery under anesthesia    History of rib fracture 12/3/2015    Chronic     Hyperlipidemia     Hypersomnia     can go multiple days without sleeping    Hypertension     Insomnia     Intolerance of continuous positive airway pressure (CPAP) ventilation 7/20/2017    Iron deficiency     Localized rash     gets frequently in axilla, groin, in any fold, on several topical treatments for this    Magnesium deficiency     Mastoiditis of left side     Mixed conductive and sensorineural hearing loss of both ears 1/10/2017    Per ENT    Mixed type COPD (chronic obstructive pulmonary disease) ([address]     On home O2, multiple inhlaers, nebulizer    Moderate recurrent major depression ([address] 10/2/2016    Morbid obesity with BMI of 45.0-49.9, adult ([address] 6/16/2015    On home oxygen therapy     3 Lpm prn    Open wound of groin 12/19/2018    healed     BARBY on CPAP     Osteoarthritis     Otitis externa of left ear     Pancreatitis chronic     Persistent depressive disorder 11/19/2019    Renal insufficiency     proteinuria    Severe depression ([address] 9/25/2013    Spinal stenosis of lumbar region without neurogenic claudication 1/6/2016    MRI lumbar 12/30/15 L3-L4: There is broad-based bulging disc which appears protruding left laterally causing flattening of the ventral thecal sac. In addition, there is facet arthropathy with mild hypertrophic changes.  There is borderline central canal stenosis with  evidence of moderate left neural foraminal narrowing and mild right neural foramina narrowing.   L4-L5: There is broad-based protrud    Syncope 4/28/2017    Tinnitus of both ears 1/10/2017    Per ENT    Type 2 diabetes mellitus with stage 3 chronic kidney disease, with long-term current use of insulin ([facility] 12/26/2016    due to underlying condition with hyperosmolarity without coma    Type II or unspecified type diabetes mellitus without mention of complication, not stated as uncontrolled     uncontrolled    Vitamin D deficiency     Wears glasses     for reading       Past Surgical History:        Procedure Laterality Date    BACK SURGERY   (x 4) 2000,.12/2011.2/2012     Dr Amym Emmanuel last [address]  04/23/2018    no stenting    COLONOSCOPY  11/3/2015    hemorrhoids, poor prep, not done    COLONOSCOPY  2013    COLONOSCOPY N/A 4/12/2021    COLONOSCOPY POLYPECTOMY SNARE/COLD BIOPSY/HOT BIOPSY/CLIP APPLICATION X1 performed by Ave Larson MD at [address] [facility]  March 2013    x 1    HAND TENDON SURGERY Left     thumb tendon repair    INTRACAPSULAR CATARACT EXTRACTION Right 11/5/2019    EYE CATARACT EMULSIFICATION IOL IMPLANT performed by Yina Pantoja MD at [facility] Left 1/7/2020    EYE CATARACT EMULSIFICATION IOL IMPLANT performed by Yina Pantoja MD at [address] [place] [address] ARTHROSCOPY Left     NERVE BLOCK  07-31-15    TENS unit    [place] ESOPHAGOGASTRODUODENOSCOPY TRANSORAL DIAGNOSTIC N/A 7/18/2018    EGD ESOPHAGOGASTRODUODENOSCOPY performed by Ave Larson MD at [facility] Bilateral 09/20/2012    Dr Jamie Talavera       Current Medications:    melatonin tablet 10.5 mg, Nightly PRN  fluticasone (FLONASE) 50 MCG/ACT nasal spray 2 spray, Daily PRN  [START ON 9/13/2021] metOLazone (ZAROXOLYN) tablet 2.5 mg, Once per day on Mon Wed Fri  vitamin B-12 (CYANOCOBALAMIN) tablet 500 mcg, Daily  venlafaxine (EFFEXOR XR) extended release capsule 75 mg, Daily  tiotropium (SPIRIVA RESPIMAT) 2.5 MCG/ACT inhaler 2 puff, Daily  [Held by provider] lisinopril (PRINIVIL;ZESTRIL) tablet 5 mg, Daily  metoprolol tartrate (LOPRESSOR) tablet 50 mg, BID  magnesium oxide (MAG-OX) tablet 400 mg, Daily  ferrous sulfate (FE TABS 325) EC tablet 325 mg, Daily  [Held by provider] spironolactone (ALDACTONE) tablet 50 mg, Daily  rosuvastatin (CRESTOR) tablet 10 mg, Nightly  escitalopram (LEXAPRO) tablet 10 mg, Daily  pantoprazole (PROTONIX) tablet 40 mg, QAM AC  clopidogrel (PLAVIX) tablet 75 mg, Daily  latanoprost (XALATAN) 0.005 % ophthalmic solution 1 drop, Nightly  butalbital-acetaminophen-caffeine (FIORICET, ESGIC) per tablet 1 tablet, Q8H PRN  docusate sodium (COLACE) capsule 100 mg, BID  Vitamin D (CHOLECALCIFEROL) tablet 1,000 Units, Daily  allopurinol (ZYLOPRIM) tablet 100 mg, Daily  amitriptyline (ELAVIL) tablet 100 mg, Nightly  gabapentin (NEURONTIN) capsule 300 mg, BID  tiZANidine (ZANAFLEX) tablet 4 mg, Q8H PRN  sodium chloride flush 0.9 % injection 5-40 mL, 2 times per day  sodium chloride flush 0.9 % injection 10 mL, PRN  0.9 % sodium chloride infusion, PRN  potassium chloride (KLOR-CON M) extended release tablet 40 mEq, PRN   Or  potassium bicarb-citric acid (EFFER-K) effervescent tablet 40 mEq, PRN   Or  potassium chloride 10 mEq/100 mL IVPB (Peripheral Line), PRN  magnesium sulfate 1000 mg in dextrose 5% 100 mL IVPB, PRN  ondansetron (ZOFRAN-ODT) disintegrating tablet 4 mg, Q8H PRN   Or  ondansetron (ZOFRAN) injection 4 mg, Q6H PRN  acetaminophen (TYLENOL) tablet 650 mg, Q6H PRN   Or  acetaminophen (TYLENOL) suppository 650 mg, Q6H PRN  magnesium hydroxide (MILK OF MAGNESIA) 400 MG/5ML suspension 30 mL, Daily PRN  nitroGLYCERIN (NITROSTAT) SL tablet 0.4 mg, Q5 Min PRN  nitroGLYCERIN 50 mg in dextrose 5% 250 mL infusion, Continuous  heparin (porcine) injection 4,000 Units, PRN  heparin (porcine) injection 2,000 Units, PRN  heparin 25,000 units in dextrose 5% 250 mL (premix) infusion, Continuous  bumetanide (BUMEX) tablet 3 mg, Daily  insulin lispro (HUMALOG) injection vial 0-12 Units, TID WC  insulin lispro (HUMALOG) injection vial 0-6 Units, Nightly  morphine injection 2 mg, Q2H PRN   Or  morphine injection 4 mg, Q2H PRN  albuterol (PROVENTIL) nebulizer solution 2.5 mg, Q6H PRN  budesonide-formoterol (SYMBICORT) 160-4.5 MCG/ACT inhaler 2 puff, BID  aspirin EC tablet 81 mg, Daily        Allergies:  Levofloxacin, Lorazepam, Nsaids, Prozac [fluoxetine hcl], and Vancomycin    Social History:   Social History     Socioeconomic History    Marital status:      Spouse name: Pritesh Sheffield Number of children: 5    Years of education: Not on file    Highest education level: Not on file   Occupational History    Occupation: disability     Comment: unemployed   Tobacco Use    Smoking status: Former Smoker     Packs/day: 0.25     Years: 33.00     Pack years: 8.25     Types: Cigarettes     Start date: 1985     Quit date: 2020     Years since quittin.8    Smokeless tobacco: Former User     Types: Snuff     Quit date: 1995   Vaping Use    Vaping Use: Never used   Substance and Sexual Activity    Alcohol use: No     Alcohol/week: 0.0 standard drinks    Drug use: Not Currently     Types: Marijuana     Comment: Patient reports he quit using THC for 26 days on 2021    Sexual activity: Not Currently   Other Topics Concern    Not on file   Social History Narrative    Middle of possible separation 2016          Social Determinants of Health     Financial Resource Strain: Low Risk     Difficulty of Paying Living Expenses: Not hard at all   Food Insecurity: No Food Insecurity    Worried About Running Out of Food in the Last Year: Never true    Basil of Food in the Last Year: Never true   Transportation Needs: No Transportation Needs    Lack of Transportation (Medical): No    Lack of Transportation (Non-Medical):  No   Physical Activity:     Days of Exercise per Week:     Minutes of Exercise per Session:    Stress:     Feeling of Stress :    Social Connections:     Frequency of Communication with Friends and Family:     Frequency of Social Gatherings with Friends and Family:     Attends Jain Services:     Active Member of Clubs or Organizations:     Attends Club or Organization Meetings:     Marital Status:    Intimate Partner Violence:     Fear of Current or Ex-Partner:     Emotionally Abused:     Physically Abused:     Sexually Abused:        Family History:   Family History   Problem Relation Age of Onset    Heart Disease Mother          age 64 from MI    High Blood Pressure Mother     Diabetes Mother     High Blood Pressure Father          age 80 from CKD and Lung Fibrosis    Kidney Disease Father     Heart Disease Sister     Heart Attack Sister     Obesity Sister     Diabetes Sister        Review of Systems:    Constitutional: No fever, no chills, no lethargy, no weakness. HEENT:  No headache, otalgia, itchy eyes, nasal discharge or sore throat. Cardiac:  + chest pain that is improving,+ dyspnea, +orthopnea or PND. Chest:              No cough, phlegm or wheezing. Abdomen:  No abdominal pain, nausea or vomiting. Neuro:  No focal weakness, abnormal movements or seizure like activity. Skin:   No rashes, no itching. :   No hematuria, no pyuria, no dysuria, no flank pain. Extremities:  No swelling or joint pains. Objective:  CURRENT TEMPERATURE:  Temp: 98.4 °F (36.9 °C)  MAXIMUM TEMPERATURE OVER 24HRS:  No data recorded.     CURRENT RESPIRATORY RATE:  Resp: 11  CURRENT PULSE:  Pulse: 73  CURRENT BLOOD PRESSURE:  BP: (!) 135/57  24HR BLOOD PRESSURE RANGE:  Systolic (00HPF), JNM:082 , Min:104 , GCR:510   ; Diastolic (76WII), EDH:33, Min:41, Max:76    24HR INTAKE/OUTPUT:      Intake/Output Summary (Last 24 hours) at 2021 0648  Last data filed at 2021 1425  Gross per 24 hour   Intake    Output 1350 ml   Net -1350 ml     Patient Vitals for the past 96 hrs (Last 3 readings):   Weight   21 0004 (!) 418 lb (189.6 kg)     Physical Exam:  General appearance:Awake, alert, in no acute distress  Skin: warm and dry, no rash or erythema  Eyes: conjunctivae normal and sclera anicteric  ENT: :no thrush no pharyngeal congestion    Neck: no carotid bruit ,no  JVD,no carotid Lymphadenopathy, noThyromegaly   Pulmonary: no wheezing or rhonchi. No rales heard. Cardiovascular: Normal S1 & S2,  No S3 or  S4, no Pericardial Rub no Murmur   Abdomen: Morbidly obese soft nontender, bowel sounds present, no organomegaly,  no ascites  Extremities:no cyanosis, clubbing + BLE +1 nonpitting edema    Labs:   CBC:  Recent Labs     09/11/21 0041 09/11/21  1045   WBC 16.3* 12.7*   RBC 3.99* 3.99*   HGB 10.6* 10.5*   HCT 35.6* 34.7*   MCV 89.2 87.0   MCH 26.6 26.3   MCHC 29.8 30.3   RDW 15.3* 15.3*    266   MPV 10.7 10.3      BMP:   Recent Labs     09/11/21 0041      K 4.5   CL 97*   CO2 25   BUN 68*   CREATININE 2.23*   GLUCOSE 157*   CALCIUM 9.5          IRON:    Lab Results   Component Value Date    IRON 48 07/24/2021     TIBC:    Lab Results   Component Value Date    TIBC 300 07/24/2021     FERRITIN:    Lab Results   Component Value Date    FERRITIN 173 07/24/2021     SPEP:   Lab Results   Component Value Date    PROT 7.4 07/24/2021    PROT 6.9 07/24/2021    ALBCAL 4.0 07/24/2021    ALBPCT 59 07/24/2021    LABALPH 0.2 07/24/2021    LABALPH 0.8 07/24/2021    A1PCT 3 07/24/2021    A2PCT 12 07/24/2021    LABBETA 0.8 07/24/2021    BETAPCT 12 07/24/2021    GAMGLOB 1.1 07/24/2021    GGPCT 15 07/24/2021    PATH ELECTRONICALLY SIGNED. Jt Campos M.D. 07/24/2021     UPEP:   Lab Results   Component Value Date    TPU 5 07/24/2021        C3:   Lab Results   Component Value Date    C3 173 08/30/2013     C4:   Lab Results   Component Value Date    C4 28 08/30/2013     MPO ANCA:    Lab Results   Component Value Date    MPO 7 05/02/2017    .   PR3 ANCA:    Lab Results   Component Value Date    PR3 7 05/02/2017     Urine Sodium:    Lab Results   Component Value Date    [address] [place] [address] Drive    This note is created with the assistance of a speech-recognition program. While intending to generate a document that actually reflects the content of the visit, no guarantees can be provided that every mistake has been identified and corrected by editing.

## 2021-09-12 NOTE — ED PROVIDER NOTES
[facility] ED  Emergency Department  Faculty Attestation       I performed a history and physical examination of the patient and discussed management with the resident. I reviewed the residents note and agree with the documented findings including all diagnostic interpretations and plan of care. Any areas of disagreement are noted on the chart. I was personally present for the key portions of any procedures. I have documented in the chart those procedures where I was not present during the key portions. I have reviewed the emergency nurses triage note. I agree with the chief complaint, past medical history, past surgical history, allergies, medications, social and family history as documented unless otherwise noted below. Documentation of the HPI, Physical Exam and Medical Decision Making performed by scribhumberto is based on my personal performance of the HPI, PE and MDM. For Physician Assistant/ Nurse Practitioner cases/documentation I have personally evaluated this patient and have completed at least one if not all key elements of the E/M (history, physical exam, and MDM). Additional findings are as noted. Pertinent Comments     Primary Care Physician: Kyung Contreras MD    History: This is a 63 y.o. male who presents to the Emergency Department with complaint of chest pain that is midsternal to left sided radiating to the left shoulder blade that started 1 hour prior to arrival.  Took 2 nitro without relief. Hisoty of CAD with prior stents. Had a recent abnormal stress.       Physical:    ED Triage Vitals [09/11/21 0004]   BP Temp Temp Source Pulse Resp SpO2 Height Weight   (!) 113/57 98.4 °F (36.9 °C) Oral 80 19 99 % 6' (1.829 m) (!) 418 lb (189.6 kg)        General: alert, obese male who appears uncomfortable   HENT: normocephalic, moist mucus membranes  Eyes: pupils equal and reactive, extraocular eye movements intact   Neck: normal ROM, trachea midline   Heart:  normal rate, regular rhythm, normal S1, S2, no murmurs, rubs, clicks or gallops   Chest: clear to auscultation, no wheezes, rales or rhonchi, symmetric air entry. Abdomen: soft, nontender, nondistended, no masses or organomegaly  no pulsatile masses   Extremities: no obvious deformities, normal ROM of all 4 extremities, mild edema of bilateral lower extremities  Neurological: alert, oriented, normal speech, no focal findings or movement disorder noted   Skin: normal  turgor, no rashes on exposed skin. Discoloration of the digits due to smoking       MDM/Plan:   Chest pain - appear uncomfortable. Took 2 nitro prior to arrival  BP on the lower end at this time - avoid additional nitro  Concern for ACS based on presentation  LBBB on EKG there is no prior EKG showing the LBBB, however there is a rhythm strip on March of this year with wide complex BBB  Cardiology consulted  They recommended CT to r/o dissection - multiple IVs blew. Clinically low suspicion of dissection.   Non-contrast CT obtained  Admit     EKG Interpretation    Interpreted by emergency department physician    Clinical Impression: LBLENKA Fernandez MD      Critical Care Time: None     Jamal Fernandez MD  Attending Emergency Physician        Jamal Fernandez MD  09/11/21 2385

## 2021-09-12 NOTE — FLOWSHEET NOTE
Assessment:  Patient is a 63year old male in room 20. Patient expressed confidence in the care he is receiving. Patient presents with complaint of chest pain. 09/12/21 0501   Encounter Summary   Services provided to: Patient   Referral/Consult From: [facility] Family members   Continue Visiting   (9/12/2021)   Complexity of Encounter Moderate   Length of Encounter 15 minutes   Spiritual Assessment Completed Yes   Routine   Type Initial   Assessment Approachable   Intervention Sustaining presence/ Ministry of presence   Outcome Expressed gratitude       Intervention:   provided active compassionate listening. Outcome:  Patient expressed gratitude for visit. Plan:  Chaplains will remain available for spiritual and emotional support as needed.

## 2021-09-12 NOTE — CARE COORDINATION
Case Management Initial Discharge Plan  Miguel Guillen.,             Met with:patient to discuss discharge plans. Information verified: address, contacts, phone number, , insurance Yes  Insurance Provider: Alexys Bravo my care dual     Emergency Contact/Next of Kin name & number: wife Lyle Mccormick and child Arline Ramirez on face sheet   Who are involved in patient's support system? above    PCP: Isela Wayne MD  Date of last visit: 2 weeks       Discharge Planning    Living Arrangements:        Home has 2 stories  2 stairs to climb to get into front door, 12stairs to climb to reach second floor  Location of bedroom/bathroom in home main    Patient able to perform ADL's:Assisted    Current Services (outpatient & in home) [facility] in the past   DME equipment: cane walk[facility]   DME provider:     Is patient receiving oral anticoagulation therapy? No    If indicated:   Physician managing anticoagulation treatment:   Where does patient obtain lab work for ATC treatment? Potential Assistance Needed:       Patient agreeable to home care: tbd- had [facility] in past not sure if wants   Freedom of choice provided:  no    Prior SNF/Rehab Placement and Facility: no  Agreeable to SNF/Rehab: tbd  Stuart of choice provided: no     Evaluation: no    Expected Discharge date:       Patient expects to be discharged to: If home: is the family and/or caregiver wiling & able to provide support at home? yes  Who will be providing this support? yes*    Follow Up Appointment: Best Day/ Time:      Transportation provider: family  Transportation arrangements needed for discharge: No    Readmission Risk              Risk of Unplanned Readmission:  39             Does patient have a readmission risk score greater than 14?: No  If yes, follow-up appointment must be made within 7 days of discharge.      Goals of Care:       Educated pt on transitional options, will provide freedom of choice as needed  and are agreeable with plan      Discharge Plan: home with wife follow for needs has pcp and ride           Electronically signed by Tanner Stanford RN on 9/12/21 at 9:47 AM EDT

## 2021-09-12 NOTE — PROGRESS NOTES
[facility]  Office: [address], DO, Christina Yin, DO, Shabbirdanita Heredia, DO, Otto Vences Blood, DO, Lexy Garza MD, Valerie Yarbrough MD, Bertha Byers MD, Aileen Jaime MD, Jerry Mcintosh MD, Kathia Nash MD, Roosevelt Short MD, Rustam Recio, DO, Soledad Valencia, DO, Jens Pisano MD,  Dena Sanchez DO, Rosalba Espinosa MD, Shanae Brown MD, Keke Verdugo MD, Melina Velasquez MD, , Joli Angelucci, MD, Terrence Horner MD, Nba Hussein MD, Michael Boateng, [facility], [facility], CNP, Carl Chiu, CNP, Vic Raphael, [facility], Felix Perkins, CNP, Jihan Patterson, CNP, Jhoan Mitchell, CNP, Lakesha Saucedo, CNP, Aurelio Leblanc, CNP, Rosamaria Coates PA-C, Perla Dove, [facility], Daysi Malik, CNP, Honorio Nails, CNP, Vicky Sear, CNP, Juan Baron, CNP, Lila Noriega, CNP, Neena Payton, CNP, Faith San, CNP, Derrick Proper, [facility]    Progress Note    9/12/2021    12:32 PM    Name:   Marla Muse. MRN:     [id number]     Acct:      [de-identified]   Room:   20/20  IP Day:  1  Admit Date:  9/11/2021 12:01 AM    PCP:   Carolina Jalloh MD  Code Status:  DNR-CCA    Subjective:     C/C:   Chief Complaint   Patient presents with    Chest Pain     Interval History Status:   Much improved  The patient reports that his chest pain is 90% resolved  No shortness of breath  No dyspepsia  No diaphoresis  Ambulating about the room and ER    Data Base Updates:  Troponin, High [id number]     Sfqidin113Dqjo mg/dL     ACA88Wblg mg/dL   CREATININE1. 88High     Brief History:     As documented in the medical record:  \"Miguel Medeiros is a 63 y.o. male who presents with Chest Pain  He was admitted through the ED Sept 11 for the management of Unstable angina ([facility] [address] Pt has a hx of CAD with stents in '13 & '18. He has COPD and wears O2 at 4L.   Last evening he was getting ready to shower when he developed severe midsternal chest pain w radiation to the lower leg cellulitis, Blood in stool, CAD (coronary artery disease), Cellulitis of both lower extremities, Cellulitis of leg, left, CHF (congestive heart failure), NYHA class III (HCC), Chronic back pain, Chronic bronchitis (HCC), Chronic headaches, Chronic respiratory failure ([address] Chronic ulcer of left leg, with fat layer exposed ([address] Class 2 severe obesity due to excess calories with serious comorbidity and body mass index (BMI) of 35.0 to 35.9 in adult [facility]), COPD exacerbation ([address] Diabetic neuropathy ([address] Displacement of lumbar intervertebral disc without myelopathy, Ear infection, Essential hypertension, Facial cellulitis, Fall, GERD (gastroesophageal reflux disease), Head injury, Hearing loss in right ear, Hepatic steatosis, History of general anesthesia complication, History of rib fracture, Hyperlipidemia, Hypersomnia, Hypertension, Insomnia, Intolerance of continuous positive airway pressure (CPAP) ventilation, Iron deficiency, Localized rash, Magnesium deficiency, Mastoiditis of left side, Mixed conductive and sensorineural hearing loss of both ears, Mixed type COPD (chronic obstructive pulmonary disease) ([address] Moderate recurrent major depression ([address] Morbid obesity with BMI of 45.0-49.9, adult ([address] On home oxygen therapy, Open wound of groin, BARBY on CPAP, Osteoarthritis, Otitis externa of left ear, Pancreatitis chronic, Persistent depressive disorder, Renal insufficiency, Severe depression ([address] Spinal stenosis of lumbar region without neurogenic claudication, Syncope, Tinnitus of both ears, Type 2 diabetes mellitus with stage 3 chronic kidney disease, with long-term current use of insulin ([address] Type II or unspecified type diabetes mellitus without mention of complication, not stated as uncontrolled, Vitamin D deficiency, and Wears glasses. Social History:   reports that he quit smoking about 10 months ago. His smoking use included cigarettes. He started smoking about 36 years ago.  He has a 8.25 pack-year smoking history. He quit smokeless tobacco use about 26 years ago. His smokeless tobacco use included snuff. He reports previous drug use. Drug: Marijuana. He reports that he does not drink alcohol. Family History:   Family History   Problem Relation Age of Onset    Heart Disease Mother          age 64 from MI   [facility] High Blood Pressure Mother     Diabetes Mother     High Blood Pressure Father          age 80 from CKD and Lung Fibrosis    Kidney Disease Father     Heart Disease Sister     Heart Attack Sister     Obesity Sister     Diabetes Sister        Review of Systems:     Review of Systems   Constitutional: Positive for activity change (Diminished). Negative for diaphoresis. Respiratory: Positive for shortness of breath (Dyspnea on exertion). Negative for cough. Cardiovascular: Positive for chest pain (Improving) and leg swelling (Chronic). Negative for palpitations. Gastrointestinal: Negative for nausea and vomiting. Psychiatric/Behavioral: Positive for dysphoric mood (Feeling depressed and frustrated by his chronic diseases). Physical Examination:        Vitals  BP (!) 141/117   Pulse 77   Temp 98.4 °F (36.9 °C) (Oral)   Resp 13   Ht 6' (1.829 m)   Wt (!) 418 lb (189.6 kg)   SpO2 93%   BMI 56.69 kg/m²   No data recorded. Recent Labs     21  0941 21  1826 21  0233   POCGLU 110 116* 239*       Physical Exam  Vitals reviewed. Constitutional:       General: He is not in acute distress. Appearance: He is not diaphoretic. HENT:      Head: Normocephalic. Nose: Nose normal.   Eyes:      General: No scleral icterus. Conjunctiva/sclera: Conjunctivae normal.   Neck:      Trachea: No tracheal deviation. Cardiovascular:      Rate and Rhythm: Normal rate and regular rhythm. Pulmonary:      Effort: Pulmonary effort is normal. No respiratory distress. Breath sounds: Normal breath sounds. No wheezing or rales. Chest:      Chest wall: No tenderness. Abdominal:      General: Bowel sounds are normal. There is no distension. Palpations: Abdomen is soft. Tenderness: There is no abdominal tenderness. Musculoskeletal:         General: No tenderness. Cervical back: Neck supple. Skin:     General: Skin is warm and dry. Findings: Rash (Stasis dermatitis at the ankles) present. Comments: Extensive tattoos         Medications: Allergies: Allergies   Allergen Reactions    Levofloxacin Anaphylaxis     Patient reports needing epinephrine \"about 5 or 6 months ago\" for anaphylaxis (itching, hives, SOB/swelling) after receiving Levofloxacin. Previous report from 08/20/2012: Nausea/Vomiting    Lorazepam      Falls      Nsaids      CHF&CKD    Prozac [Fluoxetine Hcl] Other (See Comments)     Pt started with seizures after started taking.  Vancomycin Other (See Comments)     Itching, SOB, emesis upon infusion in ED 6/12/2019. Patient states he has had vancomycin \"a number of times\" before without issue. couldn't breath and talk, throat closed       Current Meds:   Scheduled Meds:    [Held by provider] metOLazone  2.5 mg Oral Once per day on Mon Wed Fri    vitamin B-12  500 mcg Oral Daily    venlafaxine  75 mg Oral Daily    tiotropium  2 puff Inhalation Daily    [Held by provider] lisinopril  5 mg Oral Daily    metoprolol tartrate  50 mg Oral BID    magnesium oxide  400 mg Oral Daily    ferrous sulfate  325 mg Oral Daily    [Held by provider] spironolactone  50 mg Oral Daily    rosuvastatin  10 mg Oral Nightly    escitalopram  10 mg Oral Daily    pantoprazole  40 mg Oral QAM AC    clopidogrel  75 mg Oral Daily    latanoprost  1 drop Both Eyes Nightly    docusate sodium  100 mg Oral BID    Vitamin D  1,000 Units Oral Daily    allopurinol  100 mg Oral Daily    amitriptyline  100 mg Oral Nightly    gabapentin  300 mg Oral BID    sodium chloride flush  5-40 mL IntraVENous 2 times per day    bumetanide  3 mg Oral Daily    insulin lispro  0-12 Units SubCUTAneous TID     insulin lispro  0-6 Units SubCUTAneous Nightly    budesonide-formoterol  2 puff Inhalation BID    aspirin  81 mg Oral Daily     Continuous Infusions:    sodium chloride      nitroGLYCERIN 15 mcg/min (09/12/21 0654)    heparin (PORCINE) Infusion 11 Units/kg/hr (09/12/21 1118)     PRN Meds: melatonin, fluticasone, butalbital-acetaminophen-caffeine, tiZANidine, sodium chloride flush, sodium chloride, potassium chloride **OR** potassium alternative oral replacement **OR** potassium chloride, magnesium sulfate, ondansetron **OR** ondansetron, acetaminophen **OR** acetaminophen, magnesium hydroxide, nitroGLYCERIN, heparin (porcine), heparin (porcine), morphine **OR** morphine, albuterol    Data:     I/O (24Hr):     Intake/Output Summary (Last 24 hours) at 9/12/2021 1232  Last data filed at 9/11/2021 1425  Gross per 24 hour   Intake    Output 400 ml   Net -400 ml       Labs:  Hematology:  Recent Labs     09/11/21  0041 09/11/21  1045   WBC 16.3* 12.7*   RBC 3.99* 3.99*   HGB 10.6* 10.5*   HCT 35.6* 34.7*   MCV 89.2 87.0   MCH 26.6 26.3   MCHC 29.8 30.3   RDW 15.3* 15.3*    266   MPV 10.7 10.3     Chemistry:  Recent Labs     09/11/21  0041 09/11/21  0147 09/11/21  0347 09/11/21  1058 09/11/21  1403 09/12/21  1047 09/12/21  1050     --   --   --   --  135  --    K 4.5  --   --   --   --  4.7  --    CL 97*  --   --   --   --  96*  --    CO2 25  --   --   --   --  26  --    GLUCOSE 157*  --   --   --   --  236* 247   BUN 68*  --   --   --   --  70*  --    CREATININE 2.23*  --   --   --   --  1.88*  --    MG  --   --   --   --   --  2.3  --    ANIONGAP 15  --   --   --   --  13  --    LABGLOM 31*  --   --   --   --  37*  --    GFRAA 37*  --   --   --   --  45*  --    CALCIUM 9.5  --   --   --   --  9.5  --    PROBNP 150  --   --   --   --  93  --    TROPHS 45*   < > 43* 39* 40*  --   --     < > = values in this interval not displayed. Recent Labs     09/11/21  0941 09/11/21  1826 09/12/21  0233 09/12/21  1047 09/12/21  1049   PROT  --   --   --  7.8 7.5   LABALBU  --   --   --  4.1  --    AST  --   --   --  16  --    ALT  --   --   --  11  --    ALKPHOS  --   --   --  80  --    BILITOT  --   --   --  0.55  --    CHOL  --   --   --  137  --    HDL  --   --   --  30*  --    LDLCHOLESTEROL  --   --   --        --    CHOLHDLRATIO  --   --   --  4.6  --    TRIG  --   --   --  416*  --    VLDL  --   --   --  NOT REPORTED  --    POCGLU 110 116* 239*  --   --      ABG:  Lab Results   Component Value Date    PHART 7.410 08/29/2016    WKH5JPW 38.6 08/29/2016    PO2ART 76.8 08/29/2016    BHY2YXL 24.4 08/29/2016    NBEA 0.2 08/29/2016    PBEA NOT REPORTED 08/29/2016    P0BWLEVU 91.1 08/29/2016    FIO2 NOT REPORTED 10/02/2018     Lab Results   Component Value Date/Time    SPECIAL NOT REPORTED 02/22/2019 09:00 AM     Lab Results   Component Value Date/Time    CULTURE (A) 02/22/2019 09:00 AM     STREPTOCOCCI, BETA HEMOLYTIC GROUP G  MODERATE GROWTH      CULTURE DIPHTHEROIDS  MODERATE GROWTH   (A) 02/22/2019 09:00 AM    CULTURE NO ANAEROBIC ORGANISMS ISOLATED  AT 5 DAYS   (A) 02/22/2019 09:00 AM       Radiology:  CT CHEST WO CONTRAST    Result Date: 9/11/2021  The IV was lost and contrast could not be given. Dissection therefore cannot be excluded. This noncontrast exam is normal for age. Findings discussed with the ordering physician 09/11/2021 at 0321 hours. US RENAL COMPLETE    Result Date: 9/12/2021  1. Limited visualization of the right kidney grossly unremarkable. No right-sided hydronephrosis. 2. Left kidney was difficult to properly visualize due to patient's body habitus. 3. Empty urinary bladder. XR CHEST PORTABLE    Result Date: 9/11/2021  Right basilar opacity may represent airspace disease or superimposed shadows on this portable study obtained on an obese patient.  When the patient is able, a follow-up PA and lateral examination of the chest would be helpful. Assessment:        Hospital Problems         Last Modified POA    * (Principal) Unstable angina ([address] 9/11/2021 Yes    Hypertriglyceridemia 9/12/2021 Yes    LIZETH (acute kidney injury) ([address] 9/11/2021 Yes    CAD (coronary artery disease) 9/11/2021 Yes    Chronic obstructive pulmonary disease ([address] 9/11/2021 Yes    Type 2 diabetes mellitus with diabetic polyneuropathy, with long-term current use of insulin ([address] 9/11/2021 Yes    Stage 3b chronic kidney disease ([address] 9/12/2021 Yes    Essential hypertension 9/11/2021 Yes    Uncontrolled type 2 diabetes mellitus with hyperglycemia ([address] 9/12/2021 Yes    Chronic diastolic congestive heart failure ([address] 9/12/2021 Yes    Morbid obesity with BMI of 50.0-59.9, adult ([address] 9/11/2021 Yes    Hepatic steatosis 9/12/2021 Yes    Chronic back pain greater than 3 months duration 9/11/2021 Yes    Gastroesophageal reflux disease without esophagitis 9/11/2021 Yes    Iron deficiency anemia due to chronic blood loss 9/12/2021 Yes    BARBY treated with BiPAP 9/11/2021 Yes    Bilateral hearing loss 9/11/2021 Yes    Chronic respiratory failure with hypoxia ([address] 9/11/2021 Yes    Venous insufficiency of both lower extremities 9/11/2021 Yes    Chronic gout due to renal impairment without tophus 9/11/2021 Yes    Overview Signed 9/11/2021  4:27 PM by NICOLE Caruso - CNS     8/30/21 Uric acid 11.5         Anemia, normocytic normochromic 9/12/2021 Yes        .   Plan:        Aspirin  Plavix  Crestor   Metoprolol  Heparin   NTG  Cardiology evaluation in progress  He follows with Dr Ashley Bravo cath planned   Optimize cardio-pulmonary function   Check bun and creatinine  Avoid nephrotoxins  Renal evaluation (follow with Dr Angie Cardenas)  -Jesse Dsouza Lisinopril   Blood Pressure - Monitor and control  Glycemic contol - monitor and control blood sugars  Risk factor management / weight loss   Anemia w/u on outpatient basis is suggested    Respiratory Therapy and Bronchodilators prn  CPAP  Oxygen supplementation   Lexapro, Effexor as ordered   Outpatient psych follow-up suggested  Data base in progress    Phillip Dakins, DO  9/12/2021  12:32 PM

## 2021-09-12 NOTE — ED NOTES
Pt. Resting on stretcher, eyes open, RR even and non-labored  Pt.  Updated on POC  Will continue to monitor        Paco Smith RN  09/12/21 [id number]

## 2021-09-12 NOTE — ED NOTES
Pt. Provided meal tray as ordered  Pt. Repositions self for comfort  Pt.  Denies any needs at this time        Sarina Quijano RN  09/12/21 1000

## 2021-09-12 NOTE — ED NOTES
PT resting comfortably in bed. IV infusing. No complaints at this time. PT given a urinal and blanket.       Winston Rayo RN  09/12/21 [id number]

## 2021-09-12 NOTE — ED NOTES
Pt. Resting on stretcher, eyes open, RR even and non-labored  Pt.  Updated on POC  Will continue to monitor   Family at bedside      Lisa Rajput RN  09/12/21 1807

## 2021-09-13 LAB
ALBUMIN (CALCULATED): 4.4 G/DL (ref 3.2–5.2)
ALBUMIN PERCENT: 58 % (ref 45–65)
ALBUMIN SERPL-MCNC: 3.7 G/DL (ref 3.5–5.2)
ALBUMIN/GLOBULIN RATIO: 1.1 (ref 1–2.5)
ALP BLD-CCNC: 76 U/L (ref 40–129)
ALPHA 1 PERCENT: 3 % (ref 3–6)
ALPHA 2 PERCENT: 12 % (ref 6–13)
ALPHA-1-GLOBULIN: 0.2 G/DL (ref 0.1–0.4)
ALPHA-2-GLOBULIN: 0.9 G/DL (ref 0.5–0.9)
ALT SERPL-CCNC: 9 U/L (ref 5–41)
ANION GAP SERPL CALCULATED.3IONS-SCNC: 15 MMOL/L (ref 9–17)
AST SERPL-CCNC: 12 U/L
BETA GLOBULIN: 0.9 G/DL (ref 0.5–1.1)
BETA PERCENT: 12 % (ref 11–19)
BILIRUB SERPL-MCNC: 0.44 MG/DL (ref 0.3–1.2)
BUN BLDV-MCNC: 68 MG/DL (ref 6–20)
CALCIUM SERPL-MCNC: 9.3 MG/DL (ref 8.6–10.4)
CHLORIDE BLD-SCNC: 95 MMOL/L (ref 98–107)
CO2: 24 MMOL/L (ref 20–31)
CREAT SERPL-MCNC: 1.72 MG/DL (ref 0.7–1.2)
EKG ATRIAL RATE: 70 BPM
EKG P AXIS: 35 DEGREES
EKG P-R INTERVAL: 160 MS
EKG Q-T INTERVAL: 456 MS
EKG QRS DURATION: 170 MS
EKG QTC CALCULATION (BAZETT): 492 MS
EKG R AXIS: 9 DEGREES
EKG T AXIS: 104 DEGREES
EKG VENTRICULAR RATE: 70 BPM
GAMMA GLOBULIN %: 15 % (ref 9–20)
GAMMA GLOBULIN: 1.1 G/DL (ref 0.5–1.5)
GFR AFRICAN AMERICAN: 50 ML/MIN
GFR NON-AFRICAN AMERICAN: 41 ML/MIN
GFR SERPL CREATININE-BSD FRML MDRD: ABNORMAL ML/MIN/{1.73_M2}
GFR SERPL CREATININE-BSD FRML MDRD: ABNORMAL ML/MIN/{1.73_M2}
GLUCOSE BLD-MCNC: 248 MG/DL (ref 75–110)
GLUCOSE BLD-MCNC: 276 MG/DL (ref 75–110)
GLUCOSE BLD-MCNC: 294 MG/DL (ref 75–110)
GLUCOSE BLD-MCNC: 304 MG/DL (ref 70–99)
HCT VFR BLD CALC: 34.1 % (ref 40.7–50.3)
HEMOGLOBIN: 10.2 G/DL (ref 13–17)
MCH RBC QN AUTO: 26.5 PG (ref 25.2–33.5)
MCHC RBC AUTO-ENTMCNC: 29.9 G/DL (ref 28.4–34.8)
MCV RBC AUTO: 88.6 FL (ref 82.6–102.9)
NRBC AUTOMATED: 0 PER 100 WBC
P E INTERPRETATION, U: NORMAL
PARTIAL THROMBOPLASTIN TIME: 28.7 SEC (ref 20.5–30.5)
PARTIAL THROMBOPLASTIN TIME: 58.7 SEC (ref 20.5–30.5)
PARTIAL THROMBOPLASTIN TIME: 75.4 SEC (ref 20.5–30.5)
PATHOLOGIST: NORMAL
PDW BLD-RTO: 15.4 % (ref 11.8–14.4)
PHOSPHORUS: 4.1 MG/DL (ref 2.5–4.5)
PLATELET # BLD: 216 K/UL (ref 138–453)
PMV BLD AUTO: 11.3 FL (ref 8.1–13.5)
POTASSIUM SERPL-SCNC: 4.4 MMOL/L (ref 3.7–5.3)
PROTEIN ELECTROPHORESIS, SERUM: NORMAL
RBC # BLD: 3.85 M/UL (ref 4.21–5.77)
SERUM IFX INTERP: NORMAL
SODIUM BLD-SCNC: 134 MMOL/L (ref 135–144)
SPECIMEN TYPE: NORMAL
TOTAL PROT. SUM,%: 100 % (ref 98–102)
TOTAL PROT. SUM: 7.5 G/DL (ref 6.3–8.2)
TOTAL PROTEIN: 7.1 G/DL (ref 6.4–8.3)
TOTAL PROTEIN: 7.5 G/DL (ref 6.4–8.3)
URINE TOTAL PROTEIN: <4 MG/DL
WBC # BLD: 10.3 K/UL (ref 3.5–11.3)

## 2021-09-13 PROCEDURE — 2500000003 HC RX 250 WO HCPCS

## 2021-09-13 PROCEDURE — 6370000000 HC RX 637 (ALT 250 FOR IP): Performed by: CLINICAL NURSE SPECIALIST

## 2021-09-13 PROCEDURE — 99232 SBSQ HOSP IP/OBS MODERATE 35: CPT | Performed by: INTERNAL MEDICINE

## 2021-09-13 PROCEDURE — 93005 ELECTROCARDIOGRAM TRACING: CPT

## 2021-09-13 PROCEDURE — 6360000002 HC RX W HCPCS: Performed by: INTERNAL MEDICINE

## 2021-09-13 PROCEDURE — 6370000000 HC RX 637 (ALT 250 FOR IP): Performed by: NURSE PRACTITIONER

## 2021-09-13 PROCEDURE — 1200000000 HC SEMI PRIVATE

## 2021-09-13 PROCEDURE — 94660 CPAP INITIATION&MGMT: CPT

## 2021-09-13 PROCEDURE — 36415 COLL VENOUS BLD VENIPUNCTURE: CPT

## 2021-09-13 PROCEDURE — B2111ZZ FLUOROSCOPY OF MULTIPLE CORONARY ARTERIES USING LOW OSMOLAR CONTRAST: ICD-10-PCS | Performed by: INTERNAL MEDICINE

## 2021-09-13 PROCEDURE — 4A023N7 MEASUREMENT OF CARDIAC SAMPLING AND PRESSURE, LEFT HEART, PERCUTANEOUS APPROACH: ICD-10-PCS | Performed by: INTERNAL MEDICINE

## 2021-09-13 PROCEDURE — 7100000010 HC PHASE II RECOVERY - FIRST 15 MIN

## 2021-09-13 PROCEDURE — 6360000002 HC RX W HCPCS: Performed by: STUDENT IN AN ORGANIZED HEALTH CARE EDUCATION/TRAINING PROGRAM

## 2021-09-13 PROCEDURE — 85730 THROMBOPLASTIN TIME PARTIAL: CPT

## 2021-09-13 PROCEDURE — C1887 CATHETER, GUIDING: HCPCS

## 2021-09-13 PROCEDURE — 6360000002 HC RX W HCPCS: Performed by: CLINICAL NURSE SPECIALIST

## 2021-09-13 PROCEDURE — 93454 CORONARY ARTERY ANGIO S&I: CPT | Performed by: INTERNAL MEDICINE

## 2021-09-13 PROCEDURE — 84100 ASSAY OF PHOSPHORUS: CPT

## 2021-09-13 PROCEDURE — 6360000004 HC RX CONTRAST MEDICATION

## 2021-09-13 PROCEDURE — 82947 ASSAY GLUCOSE BLOOD QUANT: CPT

## 2021-09-13 PROCEDURE — 6360000002 HC RX W HCPCS

## 2021-09-13 PROCEDURE — 93005 ELECTROCARDIOGRAM TRACING: CPT | Performed by: INTERNAL MEDICINE

## 2021-09-13 PROCEDURE — 93010 ELECTROCARDIOGRAM REPORT: CPT | Performed by: INTERNAL MEDICINE

## 2021-09-13 PROCEDURE — 80053 COMPREHEN METABOLIC PANEL: CPT

## 2021-09-13 PROCEDURE — 94640 AIRWAY INHALATION TREATMENT: CPT

## 2021-09-13 PROCEDURE — 7100000011 HC PHASE II RECOVERY - ADDTL 15 MIN

## 2021-09-13 PROCEDURE — 2580000003 HC RX 258: Performed by: INTERNAL MEDICINE

## 2021-09-13 PROCEDURE — 2709999900 HC NON-CHARGEABLE SUPPLY

## 2021-09-13 PROCEDURE — C1894 INTRO/SHEATH, NON-LASER: HCPCS

## 2021-09-13 PROCEDURE — 85027 COMPLETE CBC AUTOMATED: CPT

## 2021-09-13 RX ORDER — DEXTROSE MONOHYDRATE 50 MG/ML
100 INJECTION, SOLUTION INTRAVENOUS PRN
Status: DISCONTINUED | OUTPATIENT
Start: 2021-09-13 | End: 2021-09-14 | Stop reason: HOSPADM

## 2021-09-13 RX ORDER — DEXTROSE MONOHYDRATE 25 G/50ML
12.5 INJECTION, SOLUTION INTRAVENOUS PRN
Status: DISCONTINUED | OUTPATIENT
Start: 2021-09-13 | End: 2021-09-14 | Stop reason: HOSPADM

## 2021-09-13 RX ORDER — NICOTINE POLACRILEX 4 MG
15 LOZENGE BUCCAL PRN
Status: DISCONTINUED | OUTPATIENT
Start: 2021-09-13 | End: 2021-09-14 | Stop reason: HOSPADM

## 2021-09-13 RX ADMIN — SODIUM CHLORIDE: 9 INJECTION, SOLUTION INTRAVENOUS at 00:19

## 2021-09-13 RX ADMIN — BUDESONIDE AND FORMOTEROL FUMARATE DIHYDRATE 2 PUFF: 160; 4.5 AEROSOL RESPIRATORY (INHALATION) at 21:50

## 2021-09-13 RX ADMIN — Medication 10.5 MG: at 23:35

## 2021-09-13 RX ADMIN — GABAPENTIN 300 MG: 300 CAPSULE ORAL at 23:34

## 2021-09-13 RX ADMIN — VENLAFAXINE HYDROCHLORIDE 75 MG: 75 CAPSULE, EXTENDED RELEASE ORAL at 10:56

## 2021-09-13 RX ADMIN — PANTOPRAZOLE SODIUM 40 MG: 40 TABLET, DELAYED RELEASE ORAL at 10:55

## 2021-09-13 RX ADMIN — AMITRIPTYLINE HYDROCHLORIDE 100 MG: 50 TABLET, FILM COATED ORAL at 23:34

## 2021-09-13 RX ADMIN — GABAPENTIN 300 MG: 300 CAPSULE ORAL at 10:54

## 2021-09-13 RX ADMIN — INSULIN LISPRO 6 UNITS: 100 INJECTION, SOLUTION INTRAVENOUS; SUBCUTANEOUS at 11:00

## 2021-09-13 RX ADMIN — MORPHINE SULFATE 4 MG: 4 INJECTION, SOLUTION INTRAMUSCULAR; INTRAVENOUS at 23:20

## 2021-09-13 RX ADMIN — BUDESONIDE AND FORMOTEROL FUMARATE DIHYDRATE 2 PUFF: 160; 4.5 AEROSOL RESPIRATORY (INHALATION) at 09:23

## 2021-09-13 RX ADMIN — FERROUS SULFATE TAB EC 325 MG (65 MG FE EQUIVALENT) 325 MG: 325 (65 FE) TABLET DELAYED RESPONSE at 10:55

## 2021-09-13 RX ADMIN — DOCUSATE SODIUM 100 MG: 100 CAPSULE, LIQUID FILLED ORAL at 10:54

## 2021-09-13 RX ADMIN — ESCITALOPRAM OXALATE 10 MG: 10 TABLET ORAL at 10:56

## 2021-09-13 RX ADMIN — Medication 1200 MG: at 03:08

## 2021-09-13 RX ADMIN — Medication 1200 MG: at 12:42

## 2021-09-13 RX ADMIN — INSULIN LISPRO 3 UNITS: 100 INJECTION, SOLUTION INTRAVENOUS; SUBCUTANEOUS at 23:48

## 2021-09-13 RX ADMIN — ROSUVASTATIN CALCIUM 10 MG: 10 TABLET, COATED ORAL at 23:34

## 2021-09-13 RX ADMIN — MAGNESIUM GLUCONATE 500 MG ORAL TABLET 400 MG: 500 TABLET ORAL at 10:56

## 2021-09-13 RX ADMIN — Medication 1000 UNITS: at 10:55

## 2021-09-13 RX ADMIN — Medication 500 MCG: at 10:55

## 2021-09-13 RX ADMIN — HEPARIN SODIUM AND DEXTROSE 13 UNITS/KG/HR: 10000; 5 INJECTION INTRAVENOUS at 15:47

## 2021-09-13 RX ADMIN — METOPROLOL TARTRATE 50 MG: 50 TABLET, FILM COATED ORAL at 23:34

## 2021-09-13 RX ADMIN — ALLOPURINOL 100 MG: 100 TABLET ORAL at 10:56

## 2021-09-13 RX ADMIN — Medication 1200 MG: at 23:33

## 2021-09-13 ASSESSMENT — PAIN DESCRIPTION - PROGRESSION
CLINICAL_PROGRESSION: NOT CHANGED

## 2021-09-13 ASSESSMENT — PAIN DESCRIPTION - FREQUENCY
FREQUENCY: CONTINUOUS
FREQUENCY: CONTINUOUS

## 2021-09-13 ASSESSMENT — ENCOUNTER SYMPTOMS
VOMITING: 0
NAUSEA: 0
COUGH: 0
SHORTNESS OF BREATH: 1

## 2021-09-13 ASSESSMENT — PAIN SCALES - GENERAL
PAINLEVEL_OUTOF10: 10
PAINLEVEL_OUTOF10: 6
PAINLEVEL_OUTOF10: 7
PAINLEVEL_OUTOF10: 2
PAINLEVEL_OUTOF10: 10
PAINLEVEL_OUTOF10: 2

## 2021-09-13 ASSESSMENT — PAIN DESCRIPTION - PAIN TYPE
TYPE: ACUTE PAIN
TYPE: ACUTE PAIN

## 2021-09-13 ASSESSMENT — PAIN DESCRIPTION - DIRECTION: RADIATING_TOWARDS: LOWER BACK

## 2021-09-13 ASSESSMENT — PAIN DESCRIPTION - DESCRIPTORS
DESCRIPTORS: PRESSURE
DESCRIPTORS: PRESSURE

## 2021-09-13 ASSESSMENT — PAIN - FUNCTIONAL ASSESSMENT
PAIN_FUNCTIONAL_ASSESSMENT: PREVENTS OR INTERFERES SOME ACTIVE ACTIVITIES AND ADLS
PAIN_FUNCTIONAL_ASSESSMENT: PREVENTS OR INTERFERES SOME ACTIVE ACTIVITIES AND ADLS

## 2021-09-13 ASSESSMENT — PAIN DESCRIPTION - ONSET: ONSET: ON-GOING

## 2021-09-13 ASSESSMENT — PAIN DESCRIPTION - ORIENTATION
ORIENTATION: LEFT
ORIENTATION: LEFT

## 2021-09-13 ASSESSMENT — PAIN DESCRIPTION - LOCATION
LOCATION: CHEST
LOCATION: CHEST

## 2021-09-13 NOTE — PROGRESS NOTES
[facility]  Office: [phone]  Zachary Portillo, DO, Diana Michelleer, DO, Belkis Martínez, DO, Willem Saunders, DO, Giancarlo Ibarra MD, Feliz Aldrich MD, Katlin Casas MD, Fahad Veronica MD, Adrienne Gomez MD, Yamel Gutierrez MD, Shadia Salomon MD, Brandon Casas, DO, Minda Killian DO, Renee Barraza MD,  Francois Lynne DO, Norris Holter, MD, Kimberly Goldsmith MD, Maria Elena Whitley MD, Jacklyn Langford MD, , Faith Courtney MD, Seun England MD, Kim Vázquez MD, Selma Magallon, CNP, [facility], CNP, Iman Ferguson, CNP, [place], CNS, Fernandez Shanks, CNP, Christopher Woody, CNP, Rolanda Rudolph, CNP, Amado Posada, CNP, Sandro Montanez, CNP, YECENIA Snell-C, Shaquille Marroquin, [facility], Valentina Gates, CNP, Radha Alexandre, CNP, Leonardo Mcrae, CNP, Jeny Felix, CNP, Selam Villatoro, CNP, Ortiz Ruff, CNP, Edith Pink, CNP, Itz Hines, [address] [place] [address]    Progress Note    9/13/2021    10:24 AM    Name:   Jerry Cole. MRN:     [id number]     Acct:      [de-identified]   Room:   2022/2022-01   Day:  2  Admit Date:  9/11/2021 12:01 AM    PCP:   Karina Blanco MD  Code Status:  DNR-CCA    Subjective:     C/C:   Chief Complaint   Patient presents with    Chest Pain     Interval History Status:   Improving  Somnolent  Comfortable  On CPAP  Chest pain resolving   Less SOB    Data Base Updates:  Troponin, High [id number]     ECG  Normal sinus rhythm   Left bundle branch block   Abnormal ECG   When compared with ECG of 11-SEP-2021 00:06,   No significant change was found     WBC10.3k/uL RBC3. 85Low m/uL Xafsvydney86.2Low     IAC76Thfp mg/dL   CREATININE1. 72High       Brief History:     As documented in the medical record:  \"Miguel Clinton. is a 63 y.o. male who presents with Chest Pain  He was admitted through the ED Sept 11 for the management of Unstable angina ([address] Pt has a hx of CAD with stents in '13 & '18. He has COPD and wears O2 at 4L.   Last evening he was getting ready to shower when he developed severe midsternal chest pain w radiation to the neck and left shoulder. He became diaphoretic, nauseated and more SOB than his usual.  NTG did not help. The pain was 10 of 10. The only thing that helped was morphine given in ED. Trop 45-->43-->39-->40. EKG w LBBB, ,  Glucose 157, BUN/Cr 68/2.23 ( 33/1.6 on 7/24). WBC 16.3, hgb 10.6 (his baseline). CT chest:  Done without contrast due to loss of IV, Bibasilar atelectasis. Pt was started on a NTG and Heparin drip. Cardiology is planning heart cath 9/13\"    The patient has a known history of heart disease  He follows with Dr. Grace Castro  His symptoms last night or his typical anginal pains  He states he was not going to come to the hospital because he is tired of all his chronic health problems  It was only at the insistence of his daughter that he was brought to the hospital for evaluation  He does acknowledge that he has been feeling depressed     Results for Alisa Snow (MRN [id number]) as of 9/12/2021 12:18   Ref. Range 7/24/2021 11:47 7/29/2021 21:05 8/30/2021 12:03 9/11/2021 00:41 9/12/2021 10:47   Creatinine Latest Ref Range: 0.70 - 1.20 mg/dL 1.67 (H) 1.63 (H) 1.90 (H) 2.23 (H) 1.88 (H)     3/2021:  Summary  Contrast was utilized on this technically difficult study. Left ventricle is normal in size. Mild left ventricular hypertrophy. Global left ventricular systolic function is normal. Estimated LV EF 50-55  %. No significant valvular regurgitation or stenosis seen. Cath 2018: Angiographic Findings    Cardiac Arteries and Lesion Findings   LMCA: Normal 0% stenosis.  LAD: Normal 0% stenosis.  LCx: Normal 0% stenosis. dominant  Patent OM stent   RCA: Normal 0% stenosis.    Coronary Tree    Dominance: Left    Past Medical History:   has a past medical history of Acute on chronic kidney failure ([address] Acute on chronic respiratory failure ([address] Adhesive capsulitis of left shoulder, Anxiety, Arthropathy, unspecified, other specified sites, Asthma, B12 deficiency, Bilateral lower leg cellulitis, Blood in stool, CAD (coronary artery disease), Cardiovascular stress test abnormal, Cellulitis of both lower extremities, Cellulitis of leg, left, CHF (congestive heart failure), NYHA class III ([facility]), Chronic back pain, Chronic bronchitis ([facility]), Chronic headaches, Chronic respiratory failure ([facility]), Chronic ulcer of left leg, with fat layer exposed ([address] Class 2 severe obesity due to excess calories with serious comorbidity and body mass index (BMI) of 35.0 to 35.9 in adult [facility]), COPD exacerbation ([address] Diabetic neuropathy ([address] Displacement of lumbar intervertebral disc without myelopathy, Ear infection, Essential hypertension, Facial cellulitis, Fall, GERD (gastroesophageal reflux disease), Head injury, Hearing loss in right ear, Hepatic steatosis, History of general anesthesia complication, History of rib fracture, Hyperlipidemia, Hypersomnia, Hypertension, Insomnia, Intolerance of continuous positive airway pressure (CPAP) ventilation, Iron deficiency, Localized rash, Magnesium deficiency, Mastoiditis of left side, Mixed conductive and sensorineural hearing loss of both ears, Mixed type COPD (chronic obstructive pulmonary disease) ([facility]), Moderate recurrent major depression ([address] Morbid obesity with BMI of 45.0-49.9, adult ([address] On home oxygen therapy, Open wound of groin, BARBY on CPAP, Osteoarthritis, Otitis externa of left ear, Pancreatitis chronic, Persistent depressive disorder, Renal insufficiency, Severe depression ([address] Spinal stenosis of lumbar region without neurogenic claudication, Syncope, Tinnitus of both ears, Type 2 diabetes mellitus with stage 3 chronic kidney disease, with long-term current use of insulin ([address] Type II or unspecified type diabetes mellitus without mention of complication, not stated as uncontrolled, Vitamin D deficiency, and Wears glasses. Social History:   reports that he quit smoking about 10 months ago. His smoking use included cigarettes. He started smoking about 36 years ago. He has a 8.25 pack-year smoking history. He quit smokeless tobacco use about 26 years ago. His smokeless tobacco use included snuff. He reports previous drug use. Drug: Marijuana. He reports that he does not drink alcohol. Family History:   Family History   Problem Relation Age of Onset    Heart Disease Mother          age 64 from MI   [place] High Blood Pressure Mother     Diabetes Mother     High Blood Pressure Father          age 80 from CKD and Lung Fibrosis    Kidney Disease Father     Heart Disease Sister     Heart Attack Sister     Obesity Sister     Diabetes Sister        Review of Systems:     Review of Systems   Constitutional: Positive for activity change (Diminished). Negative for diaphoresis. Respiratory: Positive for shortness of breath (Dyspnea on exertion improved). Negative for cough. Cardiovascular: Positive for chest pain (Resolving) and leg swelling (Chronic). Negative for palpitations. Gastrointestinal: Negative for nausea and vomiting. Psychiatric/Behavioral: Positive for dysphoric mood (Feeling depressed and frustrated by his chronic diseases). Physical Examination:        Vitals  /60   Pulse 74   Temp 97.7 °F (36.5 °C) (Oral)   Resp 16   Ht 6' (1.829 m)   Wt (!) 402 lb 9.6 oz (182.6 kg)   SpO2 97%   BMI 54.60 kg/m²   Temp (24hrs), Av.9 °F (36.6 °C), Min:97.7 °F (36.5 °C), Max:98 °F (36.7 °C)    Recent Labs     21  1826 21  0233 21  1930 21  0659   POCGLU 116* 239* 227* 294*       Physical Exam  Vitals reviewed. Constitutional:       General: He is not in acute distress. Appearance: He is not diaphoretic. HENT:      Head: Normocephalic. Nose: Nose normal.   Eyes:      General: No scleral icterus.      Conjunctiva/sclera: Conjunctivae normal.   Neck: Trachea: No tracheal deviation. Cardiovascular:      Rate and Rhythm: Normal rate and regular rhythm. Pulmonary:      Effort: Pulmonary effort is normal. No respiratory distress. Breath sounds: Rales present. No wheezing. Chest:      Chest wall: No tenderness. Abdominal:      General: Bowel sounds are normal. There is no distension. Palpations: Abdomen is soft. Tenderness: There is no abdominal tenderness. Musculoskeletal:         General: No tenderness. Cervical back: Neck supple. Right lower leg: Edema present. Left lower leg: Edema present. Comments: 2/4 edema at the ankles   Skin:     General: Skin is warm and dry. Findings: Rash (Stasis dermatitis at the ankles) present. Comments: Extensive tattoos         Medications: Allergies: Allergies   Allergen Reactions    Levofloxacin Anaphylaxis     Patient reports needing epinephrine \"about 5 or 6 months ago\" for anaphylaxis (itching, hives, SOB/swelling) after receiving Levofloxacin. Previous report from 08/20/2012: Nausea/Vomiting    Lorazepam      Falls      Nsaids      CHF&CKD    Prozac [Fluoxetine Hcl] Other (See Comments)     Pt started with seizures after started taking.  Vancomycin Other (See Comments)     Itching, SOB, emesis upon infusion in ED 6/12/2019. Patient states he has had vancomycin \"a number of times\" before without issue. couldn't breath and talk, throat closed       Current Meds:   Scheduled Meds:    acetylcysteine  1,200 mg Oral BID    [Held by provider] metOLazone  2.5 mg Oral Once per day on Mon Wed Fri    vitamin B-12  500 mcg Oral Daily    venlafaxine  75 mg Oral Daily    tiotropium  2 puff Inhalation Daily    [Held by provider] lisinopril  5 mg Oral Daily    metoprolol tartrate  50 mg Oral BID    magnesium oxide  400 mg Oral Daily    ferrous sulfate  325 mg Oral Daily    [Held by provider] spironolactone  50 mg Oral Daily    rosuvastatin  10 mg Oral Nightly  escitalopram  10 mg Oral Daily    pantoprazole  40 mg Oral QAM AC    clopidogrel  75 mg Oral Daily    latanoprost  1 drop Both Eyes Nightly    docusate sodium  100 mg Oral BID    Vitamin D  1,000 Units Oral Daily    allopurinol  100 mg Oral Daily    amitriptyline  100 mg Oral Nightly    gabapentin  300 mg Oral BID    sodium chloride flush  5-40 mL IntraVENous 2 times per day    bumetanide  3 mg Oral Daily    insulin lispro  0-12 Units SubCUTAneous TID WC    insulin lispro  0-6 Units SubCUTAneous Nightly    budesonide-formoterol  2 puff Inhalation BID    aspirin  81 mg Oral Daily     Continuous Infusions:    dextrose      sodium chloride Stopped (09/13/21 0119)    sodium chloride      nitroGLYCERIN 15 mcg/min (09/12/21 0654)    heparin (PORCINE) Infusion 15 Units/kg/hr (09/12/21 0783)     PRN Meds: glucose, dextrose, glucagon (rDNA), dextrose, melatonin, fluticasone, butalbital-acetaminophen-caffeine, tiZANidine, sodium chloride flush, sodium chloride, potassium chloride **OR** potassium alternative oral replacement **OR** potassium chloride, magnesium sulfate, ondansetron **OR** ondansetron, acetaminophen **OR** acetaminophen, magnesium hydroxide, nitroGLYCERIN, heparin (porcine), heparin (porcine), morphine **OR** morphine, albuterol    Data:     I/O (24Hr):   No intake or output data in the 24 hours ending 09/13/21 1024    Labs:  Hematology:  Recent Labs     09/11/21  0041 09/11/21  1045 09/13/21  0624   WBC 16.3* 12.7* 10.3   RBC 3.99* 3.99* 3.85*   HGB 10.6* 10.5* 10.2*   HCT 35.6* 34.7* 34.1*   MCV 89.2 87.0 88.6   MCH 26.6 26.3 26.5   MCHC 29.8 30.3 29.9   RDW 15.3* 15.3* 15.4*    266 216   MPV 10.7 10.3 11.3     Chemistry:  Recent Labs     09/11/21  0041 09/11/21  0041 09/11/21  0147 09/11/21  1403 09/12/21  1047 09/12/21  1050 09/12/21  1327 09/12/21  2106 09/13/21  0624     --   --   --  135  --   --   --  134*   K 4.5  --   --   --  4.7  --   --   --  4.4   CL 97*  -- --   --  96*  --   --   --  95*   CO2 25  --   --   --  26  --   --   --  24   GLUCOSE 157*   < >  --   --  236* 247  --   --  304*   BUN 68*  --   --   --  70*  --   --   --  68*   CREATININE 2.23*  --   --   --  1.88*  --   --   --  1.72*   MG  --   --   --   --  2.3  --   --   --   --    ANIONGAP 15  --   --   --  13  --   --   --  15   LABGLOM 31*  --   --   --  37*  --   --   --  41*   GFRAA 37*  --   --   --  45*  --   --   --  50*   CALCIUM 9.5  --   --   --  9.5  --   --   --  9.3   PHOS  --   --   --   --   --   --   --   --  4.1   PROBNP 150  --   --   --  93  --   --   --   --    TROPHS 45*  --    < > 40*  --   --  34* 35*  --     < > = values in this interval not displayed.      Recent Labs     09/11/21  0941 09/11/21  1826 09/12/21  0233 09/12/21  1047 09/12/21  1049 09/12/21  1930 09/13/21  0624 09/13/21  0659   PROT  --   --   --  7.8 7.5  --  7.1  --    LABALBU  --   --   --  4.1  --   --  3.7  --    AST  --   --   --  16  --   --  12  --    ALT  --   --   --  11  --   --  9  --    ALKPHOS  --   --   --  80  --   --  76  --    BILITOT  --   --   --  0.55  --   --  0.44  --    CHOL  --   --   --  137  --   --   --   --    HDL  --   --   --  30*  --   --   --   --    LDLCHOLESTEROL  --   --   --        --   --   --   --    CHOLHDLRATIO  --   --   --  4.6  --   --   --   --    TRIG  --   --   --  416*  --   --   --   --    VLDL  --   --   --  NOT REPORTED  --   --   --   --    POCGLU 110 116* 239*  --   --  227*  --  294*     ABG:  Lab Results   Component Value Date    PHART 7.410 08/29/2016    SRX8GYY 38.6 08/29/2016    PO2ART 76.8 08/29/2016    DVE4CSL 24.4 08/29/2016    NBEA 0.2 08/29/2016    PBEA NOT REPORTED 08/29/2016    X6EGXOES 91.1 08/29/2016    FIO2 NOT REPORTED 10/02/2018     Lab Results   Component Value Date/Time    SPECIAL NOT REPORTED 02/22/2019 09:00 AM     Lab Results   Component Value Date/Time    CULTURE (A) 02/22/2019 09:00 AM     STREPTOCOCCI, BETA HEMOLYTIC GROUP G  MODERATE GROWTH      CULTURE DIPHTHEROIDS  MODERATE GROWTH   (A) 02/22/2019 09:00 AM    CULTURE NO ANAEROBIC ORGANISMS ISOLATED  AT 5 DAYS   (A) 02/22/2019 09:00 AM       Radiology:  CT CHEST WO CONTRAST    Result Date: 9/11/2021  The IV was lost and contrast could not be given. Dissection therefore cannot be excluded. This noncontrast exam is normal for age. Findings discussed with the ordering physician 09/11/2021 at 0321 hours. US RENAL COMPLETE    Result Date: 9/12/2021  1. Limited visualization of the right kidney grossly unremarkable. No right-sided hydronephrosis. 2. Left kidney was difficult to properly visualize due to patient's body habitus. 3. Empty urinary bladder. XR CHEST PORTABLE    Result Date: 9/11/2021  Right basilar opacity may represent airspace disease or superimposed shadows on this portable study obtained on an obese patient. When the patient is able, a follow-up PA and lateral examination of the chest would be helpful.        Assessment:        Hospital Problems         Last Modified POA    * (Principal) Unstable angina ([address] 9/11/2021 Yes    Hypertriglyceridemia 9/12/2021 Yes    LIZETH (acute kidney injury) ([address] 9/11/2021 Yes    CAD (coronary artery disease) 9/11/2021 Yes    Chronic obstructive pulmonary disease ([address] 9/11/2021 Yes    Type 2 diabetes mellitus with diabetic polyneuropathy, with long-term current use of insulin ([address] 9/11/2021 Yes    Stage 3b chronic kidney disease ([address] 9/12/2021 Yes    Essential hypertension 9/11/2021 Yes    Uncontrolled type 2 diabetes mellitus with hyperglycemia ([address] 9/12/2021 Yes    Chronic diastolic congestive heart failure ([address] 9/12/2021 Yes    Morbid obesity with BMI of 50.0-59.9, adult ([address] 9/11/2021 Yes    Hepatic steatosis 9/12/2021 Yes    Chronic back pain greater than 3 months duration 9/11/2021 Yes    Gastroesophageal reflux disease without esophagitis 9/11/2021 Yes    Iron deficiency anemia due to chronic blood loss 9/12/2021 Yes    BARBY treated with BiPAP 9/11/2021 Yes    Bilateral hearing loss 9/11/2021 Yes    Chronic respiratory failure with hypoxia ([address] 9/11/2021 Yes    Venous insufficiency of both lower extremities 9/11/2021 Yes    Chronic gout due to renal impairment without tophus 9/11/2021 Yes    Overview Signed 9/11/2021  4:27 PM by NICOLE Rangel - CNS     8/30/21 Uric acid 11.5         Anemia, normocytic normochromic 9/12/2021 Yes        .   Plan:        Aspirin  Plavix  Crestor   Metoprolol  Heparin drip  NTG drip  Cardiology evaluation in progress  Cardiac cath planned   Optimize cardio-pulmonary function   Check bun and creatinine  Avoid nephrotoxins  Renal evaluation (follow with Dr Eli Gonzales)  [address] [facility] Lisinopril   Blood Pressure - Monitor and control  Glycemic contol - monitor and control blood sugars  Risk factor management / weight loss   Anemia w/u on outpatient basis is suggested    Respiratory Therapy and Bronchodilators prn  CPAP  Oxygen supplementation   Lexapro, Effexor as ordered for depression  Outpatient psych follow-up suggested  Data base in progress    Félix Lopez DO  9/13/2021  10:24 AM

## 2021-09-13 NOTE — PROGRESS NOTES
Renal Progress Note    Patient :  Annie Watt.; 63 y.o. MRN# [id number]  Location:  2022/2022-01  Attending:  Cole Millan DO  Admit Date:  9/11/2021   Hospital Day: 2      Subjective:       HPI: 64 yo male with new onset LBBB undergoing cardiac catheterization. Baseline CKD stage III, creatinine 1.6-1.9. nephrology consulted for clearance for cardiac catheterization requiring IV contrast.    Patient seen and examined at bedside. No acute events overnight. Denied any chest pain. Afebrile. VS stable. Used BIPAP overnight. Labs reviewed, Na 134, K 4.4, chloride 95, creatinine 1.88-->1.72, creatinine was 2.23 at time of admission. BUN 68  Calcium 9.3, phos 4.1, albumin 3.7    LIZETH workup: SPEP pending, normal kappa/ lambda ratio, elevated kappa as well as lambda light chains. Hepatitis panel negative. urine chemistries wnl. Plan for cath today. Currently on IV normal saline at 50 ml/hr  Urine output is not being charted correctly. Outpatient Medications:     Medications Prior to Admission: oxyCODONE HCl (OXY-IR) 10 MG immediate release tablet, Take 1 tablet by mouth every 8 hours as needed for Pain for up to 30 days. predniSONE (DELTASONE) 10 MG tablet, Take 4 tabs X 3 days, then 3 tabs X 3 days, then 2 tabs X 3 days, then 1 tab X 3 days  allopurinol (ZYLOPRIM) 100 MG tablet, Take 1 tablet by mouth daily FOR GOUT.  STOP IT IF ANY RASH DEVELOPS!  vitamin D3 (CHOLECALCIFEROL) 25 MCG (1000 UT) TABS tablet, Take 1 tablet by mouth daily  docusate sodium (COLACE) 100 MG capsule, Take 1 capsule by mouth 2 times daily For constipation  latanoprost (XALATAN) 0.005 % ophthalmic solution, 1 drop nightly  gabapentin (NEURONTIN) 300 MG capsule, Take 1 po bid  butalbital-acetaminophen-caffeine (FIORICET, ESGIC) -40 MG per tablet, Take 1 tablet by mouth every 8 hours as needed for Headaches  clopidogrel (PLAVIX) 75 MG tablet, Take 1 tablet by mouth daily  nitroGLYCERIN (NITROSTAT) 0.4 MG SL tablet, DISSOLVE 1 TAB UNDER TONGUE FOR CHEST PAIN - IF PAIN REMAINS AFTER 5 MIN, CALL 911 AND REPEAT DOSE. MAX 3 TABS IN 15 MINUTES  pantoprazole (PROTONIX) 40 MG tablet, Take 1 tablet by mouth every morning (before breakfast)  Insulin Syringe-Needle U-100 31G X 5/16\" 1 ML MISC, Use to subcutaneously inject insulin three times daily  insulin regular human (HUMULIN R) 500 UNIT/ML concentrated injection vial, Patient using 0.52ml with breakfast, 0.52ml with lunch and 0.45ml with dinner. escitalopram (LEXAPRO) 10 MG tablet, Take 1 tablet by mouth daily  Continuous Blood Gluc Sensor (FREESTYLE CRISTINE 14 DAY SENSOR) MISC, Use one sensor every 14 days  rosuvastatin (CRESTOR) 10 MG tablet, Take 1 tablet by mouth nightly Stop pravastatin  Continuous Blood Gluc Sensor (FREESTYLE CRISTINE 2 SENSOR) [facility], Patient to apply a new sensor every 14 days. RX to cover voucher patient will bring in.  ammonium lactate (LAC-HYDRIN) 12 % cream, Apply topically to dry skin BID.  sulfacetamide (BLEPH-10) 10 % ophthalmic solution,   mupirocin (BACTROBAN) 2 % ointment, Apply topically 2 times daily on the affected area for 7-10 days. OK to substitute to cream  Gauze Pads & Dressings 4\"X4\" PADS, Twice a day dressing of right leg wound  Gauze Bandages (ROLLED GAUZE BANDAGE 4\"X2. 5YD) MISC, For twice a day dressing  PROAIR  (90 Base) MCG/ACT inhaler, INHALE TWO PUFFS BY MOUTH EVERY 6 HOURS AS NEEDED FOR WHEEZING OR FOR SHORTNESS OF BREATH  amitriptyline (ELAVIL) 50 MG tablet, Take 2 po qhs  nystatin (MYCOSTATIN) 615783 UNIT/GM powder, Apply 2 times daily in the skin folds for several months  tiZANidine (ZANAFLEX) 4 MG tablet, TAKE ONE TABLET BY MOUTH EVERY 8 HOURS AS NEEDED FOR BACK PAIN  bumetanide (BUMEX) 1 MG tablet, TAKE THREE TABLETS BY MOUTH TWICE A DAY  spironolactone (ALDACTONE) 50 MG tablet, Take 1 tablet by mouth daily  ammonium lactate (LAC-HYDRIN) 12 % lotion, Apply topically daily.  For dry skin  clobetasol (TEMOVATE) 0.05 % ointment, Apply topically 2 times daily for psoriasis  ketoconazole (NIZORAL) 2 % cream, Apply twice a day for yeast infection in the skin folds, for 4 weeks  albuterol (PROVENTIL) (2.5 MG/3ML) 0.083% nebulizer solution, Take 3 mLs by nebulization every 6 hours as needed for Wheezing or Shortness of Breath  Ferrous Sulfate (IRON) 325 (65 Fe) MG TABS, Take 1 tablet by mouth daily  metoprolol tartrate (LOPRESSOR) 50 MG tablet, Take 1 tablet by mouth 2 times daily  magnesium oxide (MAG-OX) 400 (240 Mg) MG tablet, Take 1 tablet by mouth daily  lisinopril (PRINIVIL;ZESTRIL) 5 MG tablet, Take 1 tablet by mouth daily . Discontinued Lisinopril  10 mg  fluticasone-salmeterol (ADVAIR HFA) 230-21 MCG/ACT inhaler, Inhale 2 puffs into the lungs 2 times daily  tiotropium (SPIRIVA RESPIMAT) 2.5 MCG/ACT AERS inhaler, Inhale 2 puffs into the lungs daily  venlafaxine (EFFEXOR XR) 75 MG extended release capsule, Take 1 capsule by mouth daily Take with food  naloxone 4 MG/0.1ML LIQD nasal spray, 1 spray by Nasal route as needed for Opioid Reversal Due to COPD and sleep apnea, this is a big recommendation for you  blood glucose monitor strips, Test 3 times a day & as needed for symptoms of irregular blood glucose. Dispense sufficient amount for indicated testing frequency plus additional to accommodate PRN testing needs. One touch Ultra blue  Lancets MISC, Use to check blood sugar three times daily along with when necessary due to symptoms. vitamin B-12 (CYANOCOBALAMIN) 500 MCG tablet, Take 1 tablet by mouth daily  Oxygen Tubing MISC, by Does not apply route DX COPD. chronic respiratory failure  Respiratory Therapy Supplies (NEBULIZER/TUBING/MOUTHPIECE) KIT, Dx COPD needs nebulizer supplies  ONE TOUCH ULTRASOFT LANCETS MISC, Patient to test blood sugar up to 4 times daily. Lancet Devices (LANCING DEVICE) MISC, Provide patient with lancing device appropriate for his machine/lancing needles.   Lidocaine 4 % LOTN, Apply topically  Spacer/Aero Chamber Mouthpiece MISC, 1 each by Does not apply route once as needed (to be used with his inhalers)  metolazone (ZAROXOLYN) 2.5 MG tablet, Take 2.5 mg by mouth three times a week MWF  Handicap Placard MISC, by Does not apply route Can't walk greater than 200 feet. Expires in 5 years. fluticasone (CUTIVATE) 0.05 % cream, Apply topically 2 times daily   fluticasone (FLONASE) 50 MCG/ACT nasal spray, 2 sprays by Nasal route daily (Patient taking differently: 2 sprays by Nasal route daily as needed (sinus symptoms) )  Melatonin 10 MG TABS, Take 10 mg by mouth nightly as needed (insomnia)  aspirin 81 MG EC tablet, Take 81 mg by mouth daily.     Current Medications:     Scheduled Meds:    acetylcysteine  1,200 mg Oral BID    [Held by provider] metOLazone  2.5 mg Oral Once per day on Mon Wed Fri    vitamin B-12  500 mcg Oral Daily    venlafaxine  75 mg Oral Daily    tiotropium  2 puff Inhalation Daily    [Held by provider] lisinopril  5 mg Oral Daily    metoprolol tartrate  50 mg Oral BID    magnesium oxide  400 mg Oral Daily    ferrous sulfate  325 mg Oral Daily    [Held by provider] spironolactone  50 mg Oral Daily    rosuvastatin  10 mg Oral Nightly    escitalopram  10 mg Oral Daily    pantoprazole  40 mg Oral QAM AC    clopidogrel  75 mg Oral Daily    latanoprost  1 drop Both Eyes Nightly    docusate sodium  100 mg Oral BID    Vitamin D  1,000 Units Oral Daily    allopurinol  100 mg Oral Daily    amitriptyline  100 mg Oral Nightly    gabapentin  300 mg Oral BID    sodium chloride flush  5-40 mL IntraVENous 2 times per day    [Held by provider] bumetanide  3 mg Oral Daily    insulin lispro  0-12 Units SubCUTAneous TID     insulin lispro  0-6 Units SubCUTAneous Nightly    budesonide-formoterol  2 puff Inhalation BID    aspirin  81 mg Oral Daily     Continuous Infusions:    sodium chloride Stopped (09/13/21 0119)    sodium chloride      nitroGLYCERIN 15 mcg/min (09/12/21 0666)    heparin 09/11/21  0041 09/11/21  0041 09/12/21  1047 09/12/21  1050 09/13/21  0624     --  135  --  134*   K 4.5  --  4.7  --  4.4   CL 97*  --  96*  --  95*   CO2 25  --  26  --  24   BUN 68*  --  70*  --  68*   CREATININE 2.23*  --  1.88*  --  1.72*   GLUCOSE 157*   < > 236* 247 304*   CALCIUM 9.5  --  9.5  --  9.3    < > = values in this interval not displayed. Phosphorus:     Recent Labs     09/13/21  0624   PHOS 4.1     Magnesium:    Recent Labs     09/12/21  1047   MG 2.3     Albumin:    Recent Labs     09/12/21  1047 09/13/21  0624   LABALBU 4.1 3.7     BNP:      Lab Results   Component Value Date    BNP 15 09/11/2013     PREMA:      Lab Results   Component Value Date    PREMA  05/02/2017     This test is equivocal, which represents a borderline PREMA result.   Suggest     SPEP:  Lab Results   Component Value Date    PROT 7.1 09/13/2021    ALBCAL PENDING 09/12/2021    ALBPCT PENDING 09/12/2021    LABALPH PENDING 09/12/2021    LABALPH PENDING 09/12/2021    A1PCT PENDING 09/12/2021    A2PCT PENDING 09/12/2021    LABBETA PENDING 09/12/2021    BETAPCT PENDING 09/12/2021    GAMGLOB PENDING 09/12/2021    GGPCT PENDING 09/12/2021    PATH PENDING 09/12/2021     UPEP:     Lab Results   Component Value Date    LABPE PENDING 09/12/2021     C3:     Lab Results   Component Value Date    C3 170 09/12/2021     C4:     Lab Results   Component Value Date    C4 31 09/12/2021     MPO ANCA:     Lab Results   Component Value Date    MPO 7 05/02/2017     PR3 ANCA:     Lab Results   Component Value Date    PR3 7 05/02/2017     Anti-GBM:     Lab Results   Component Value Date    GBMABIGG 8 08/30/2013     Hep BsAg:         Lab Results   Component Value Date    HEPBSAG NONREACTIVE 09/12/2021     Hep C AB:          Lab Results   Component Value Date    HEPCAB NONREACTIVE 09/12/2021       Urinalysis/Chemistries:      Lab Results   Component Value Date    NITRU NEGATIVE 09/12/2021    COLORU YELLOW 09/12/2021    PHUR 6.0 09/12/2021    WBCUA None 02/17/2016    RBCUA 0 TO 2 02/17/2016    MUCUS NOT REPORTED 02/17/2016    TRICHOMONAS NOT REPORTED 02/17/2016    YEAST NOT REPORTED 02/17/2016    BACTERIA NOT REPORTED 02/17/2016    CLARITYU Clear 10/31/2018    SPECGRAV 1.010 09/12/2021    LEUKOCYTESUR NEGATIVE 09/12/2021    UROBILINOGEN Normal 09/12/2021    BILIRUBINUR NEGATIVE 09/12/2021    BILIRUBINUR neg 06/30/2016    BLOODU Negative 10/31/2018    GLUCOSEU NEGATIVE 09/12/2021    KETUA NEGATIVE 09/12/2021    AMORPHOUS NOT REPORTED 02/17/2016     Urine Sodium:     Lab Results   Component Value Date    MARIA L 84 09/12/2021     Urine Potassium:    Lab Results   Component Value Date    KUR 28.4 07/10/2017     Urine Chloride:    Lab Results   Component Value Date    CLUR 73 09/12/2021     Urine Osmolarity:   Lab Results   Component Value Date    OSMOU 363 02/17/2016     Urine Creatinine:     Lab Results   Component Value Date    LABCREA 38.5 01/20/2021     Radiology:     Reviewed. Assessment:       1. LIZETH secondary to ATN from diuretic use worsened by NSTEMI and demand ischemia, now improved back to baseline. Patient will be getting cardiac catheterization today, will continue monitor renal function. 2. CKD stage III secondary to hypertension and diabetic nephropathy, follows with Dr. Maria R Saenz as outpatient, last appt was 10/31/18. CRT 1.5 at that time. Now baseline creatinine seems to be around 1.6 to 1.9 mg/DL. His Bumex was increased to 3 mg twice daily and his Zaroxolyn was increased to 2.5 mg daily as needed  3. Essential hypertension  4. NSTEMI with significant cardiac history including stent placement in the past.  Cardiology planning for cardiac cath on 9/13.  5. CHF echo from 3/25/2021 showing an ejection fraction of greater than 55% without significant structural or valvular abnormality  6. ROBERT  7. Diabetes mellitus type 2 with neuropathy  8.  End-stage COPD on low flow nasal cannula at home as needed  9. history of CAD status post stent placement  10. Morbid obesity with BARBY on home CPAP  11. Peripheral edema with generalized anasarca  12. Chronic gout on maintenance allopurinol  13. Depression with anxiety    Plan:   1. Renal ultrasound reviewed, no obstruction identified  2. Continue Bumex, hold Zaroxolyn  3. Continuous IV fluids 50 cc an hour normal saline for 8 hours post cardiac assessment. 4. Continue Mucomyst 2 doses before 2 doses after cardiac catheterization. 5. Monitor strict I's and O's and renal function. 6. Contrast related renal risks including all the way up to requiring dialysis were discussed in detail and patient is okay with getting the procedure done as required. 7. BMP in AM.  8. Will follow. Nutrition   Please ensure that patient is on a renal diet/TF. Avoid nephrotoxic drugs/contrast exposure. We will continue to follow along with you. Nidia Odonnell  PGY-2  Internal Medicine  [address]  9/13/2021 9:26 AM     Attending Physician Statement  I have discussed the care of this patient, including pertinent history and exam findings, with the Resident/CNP. I have reviewed and edited the key elements of all parts of the encounter with the Resident/CNP. I agree with the assessment, plan and orders as documented by the Resident/CNP. Pj Silverman MD   Nephrology [address] [place] [address] Drive    This note is created with the assistance of a speech-recognition program. While intending to generate a document that actually reflects the content of the visit, no guarantees can be provided that every mistake has been identified and corrected by editing.

## 2021-09-13 NOTE — OP NOTE
[facility]  Cardiac catheterization note        Date:   9/13/2021  Patient name: Radha Perez. Date of admission:  9/11/2021 12:01 AM  MRN:   [id number]  YOB: 1965    Operators:  Linnette Hopkins MD (Attending Physician)     Roya Simms                                     (CV Fellow)      Pre Procedure Conscious Sedation Data:    ASA Class:    [] I [x] II [] III [] IV    Mallampati Class:  [] I [x] II [] III [] IV      Indications:  1. Chest pain   2. Elevated troponin     Procedure:   1. Left heart catheterization   2. Selective left and right coronary angiography       Access:  [] Femoral  [x] Radial  artery       [x] Right  [] Left    Procedure: After informed consent was obtained with explanation of the risks and benefits, patient was brought to the cath lab. The access area was prepped and draped in sterile fashion. 1% lidocaine was used for local block. The artery was cannulated with 6  Fr sheath with brisk arterial blood return. The side port was frequently flushed and aspirated with normal saline. Findings:  LMCA: Short with separate ostia of LCX/LAD     LAD: Mid 20-30% stenosis   Diag is small, patent with mild disease     LCx: Mild irregularities 10-20%. Large and co-dominant vessel, patent with   10-20% stenosis   OMs are small and patent   LPDA/LPL are patent with mild disease     RCA: Mild irregularities 10-20%. Co-dominant vessel. RPDA is large and patent with mild disease      Coronary Tree        Dominance: Left     Procedure Summary        1. Mild non-obstructive CAD    2. No change from last angiogram in 2018    3. LV gram not done due to renal insufficiency        Recommendations        Routine Post Diagnostic Cath orders.    optimize medical therapy    Risk factor modification.        Estimated Blood Loss:            [] Minimal 10 cc   [x] Minimal < 25 cc      [] Moderate 25-50 cc         [] >50 cc            Electronically signed by Roya Simms MD on 9/13/2021 at 5:58 PM  Cardiovascular fellow  [facility]        Edwin Lee MD, Keon Stern

## 2021-09-13 NOTE — PROGRESS NOTES
[facility]  Progress Note                   Date:   9/13/2021  Patient name: Federico Courtney. Date of admission:  9/11/2021 12:01 AM  MRN:   [id number]  YOB: 1965  PCP: Janette Oquendo MD    Reason for Admission: LBBB (left bundle branch block) [I44.7]  Chest pain [R07.9]  Elevated troponin [R77.8]  LIZETH (acute kidney injury) ([facility] [address] [N17.9]  Chest pain due to myocardial ischemia, unspecified ischemic chest pain type [I25.9]  Acute on chronic congestive heart failure, unspecified heart failure type ([address] [I50.9]    Subjective:       Clinical Changes /Abnormalities: Patient seen and examined at the bed side. No new acute events overnight. Patient is doing well, has no complaints. Denies chest pain currently. Remains on heparin and nitro drips. Patient states that breathing has improved since admission. Used BiPap overnight. Reviewed vitals, labs, tele, & previous testing. SR with peaked T Waves. Patient states has been NPO since midnight. May require use of BiPap during cath today.        Medications:   Scheduled Meds:   acetylcysteine  1,200 mg Oral BID    [Held by provider] metOLazone  2.5 mg Oral Once per day on Mon Wed Fri    vitamin B-12  500 mcg Oral Daily    venlafaxine  75 mg Oral Daily    tiotropium  2 puff Inhalation Daily    [Held by provider] lisinopril  5 mg Oral Daily    metoprolol tartrate  50 mg Oral BID    magnesium oxide  400 mg Oral Daily    ferrous sulfate  325 mg Oral Daily    [Held by provider] spironolactone  50 mg Oral Daily    rosuvastatin  10 mg Oral Nightly    escitalopram  10 mg Oral Daily    pantoprazole  40 mg Oral QAM AC    clopidogrel  75 mg Oral Daily    latanoprost  1 drop Both Eyes Nightly    docusate sodium  100 mg Oral BID    Vitamin D  1,000 Units Oral Daily    allopurinol  100 mg Oral Daily    amitriptyline  100 mg Oral Nightly    gabapentin  300 mg Oral BID    sodium chloride flush  5-40 mL IntraVENous 2 times per day  [Held by provider] bumetanide  3 mg Oral Daily    insulin lispro  0-12 Units SubCUTAneous TID WC    insulin lispro  0-6 Units SubCUTAneous Nightly    budesonide-formoterol  2 puff Inhalation BID    aspirin  81 mg Oral Daily     Continuous Infusions:   sodium chloride Stopped (09/13/21 0119)    sodium chloride      nitroGLYCERIN 15 mcg/min (09/12/21 0654)    heparin (PORCINE) Infusion 15 Units/kg/hr (09/12/21 2253)     CBC:   Recent Labs     09/11/21  0041 09/11/21  1045 09/13/21  0624   WBC 16.3* 12.7* 10.3   HGB 10.6* 10.5* 10.2*    266 216     BMP:    Recent Labs     09/11/21  0041 09/11/21  0041 09/12/21  1047 09/12/21  1050 09/13/21  0624     --  135  --  134*   K 4.5  --  4.7  --  4.4   CL 97*  --  96*  --  95*   CO2 25  --  26  --  24   BUN 68*  --  70*  --  68*   CREATININE 2.23*  --  1.88*  --  1.72*   GLUCOSE 157*   < > 236* 247 304*    < > = values in this interval not displayed. Hepatic:  Recent Labs     09/12/21  1047 09/13/21  0624   AST 16 12   ALT 11 9   BILITOT 0.55 0.44   ALKPHOS 80 76     Troponin:   Recent Labs     09/11/21  1403 09/12/21  1327 09/12/21  2106   TROPHS 40* 34* 35*     BNP: No results for input(s): BNP in the last 72 hours. Lipids:   Recent Labs     09/12/21  1047   CHOL 137   HDL 30*     INR: No results for input(s): INR in the last 72 hours. Objective:   Vitals: /60   Pulse 74   Temp 97.7 °F (36.5 °C) (Oral)   Resp 19   Ht 6' (1.829 m)   Wt (!) 402 lb 9.6 oz (182.6 kg)   SpO2 97%   BMI 54.60 kg/m²   General appearance: alert and cooperative with exam  HEENT: Head: Normocephalic, no lesions, without obvious abnormality. Neck:no JVD, trachea midline, no adenopathy  Lungs: Clear with diminished breath sounds in bases bilaterally.  Currently oxygen per BiPap  Heart: Regular rate and rhythm, s1/s2 auscultated, no murmurs, SR  Abdomen: soft, non-tender, bowel sounds active, obese  Extremities: generalized edema  Neurologic: not done    ECHO 3/2021: Contrast was utilized on this technically difficult study. Left ventricle is normal in size. Mild left ventricular hypertrophy. Global left ventricular systolic function is normal. Estimated LV EF 50-55  %. No significant valvular regurgitation or stenosis seen.     STRESS 2018: Findings concerning   for reversible ischemia of the mid and anterior inferior wall as well as the   apical lateral wall. 2. Left ventricular ejection fraction of 53%. 3.  Please see report for EKG portion of the examination which will be   performed separately by physician from cardiology. Risk stratification:  High risk     Cardiac Cath 4/2018  Angiographic Findings      Cardiac Arteries and Lesion Findings     LMCA: Normal 0% stenosis.     LAD: Normal 0% stenosis.     LCx: Normal 0% stenosis. dominant  Patent OM stent     RCA: Normal 0% stenosis.      Coronary Tree      Dominance: Left     LV Analysis  LV function assessed as:Normal.  Ejection Fraction  +----------------------------------------------------------------------+---+  ! Method                                                                ! EF%! +----------------------------------------------------------------------+---+  ! LV gram                                                               !50 !  +----------------------------------------------------------------------+---+  Procedure Summary      Patent OM stent   No new lesions   Overall preserved LV function      Recommendations      Medical treatment   Risk factors modification    Assessment / Acute Cardiac Problems:   1. Unstable Angina  2. New LBBB as compared to prior ECG  3. CAD with hx of PCI/stent OM1 in 2016  4. Abnormal stress test March 2021  5. LIZETH on CKD3  6. HTN  7. DM2  8. Chronic resp failure with COPD on home oxygen  9. Morbid obesity  10. BARBY on CPAP  11. Chronic gout  12.  Depression with anxiety    Patient Active Problem List:     DDD (degenerative disc disease), lumbar     Otitis Notification Instructions: Patient will be notified of biopsy results. However, patient instructed to call the office if not contacted within 2 weeks. hypoxia (HCC)     Chronic infective otitis externa     Benign hypertensive heart and CKD, stage 3 (GFR 30-59), w CHF ([facility] [address]     Hypomagnesemia     Chronic malignant otitis externa of both ears     Psoriasis     Tinea corporis     Colon cancer screening declined     Excoriation     Lymphedema of both lower extremities     Venous insufficiency of both lower extremities     PVD (peripheral vascular disease) (HCC)     Chronic gout due to renal impairment without tophus     Chest pain     Unstable angina (HCC)     Acute on chronic congestive heart failure (HCC)     Elevated troponin     LBBB (left bundle branch block)      Plan of Treatment:   1. Stable. Remains on heparin drip and Nitro at 15mcg/min. Continue aspirin, plavix, statin and BB. 2. Appreciate nephrology input/clearance for cardiac cath. Creatinine 1.72 this morning  3. HTN. BP stable. Continue BB. 4. Aldactone, Zaroxolyn, & ACE on hold d/t LIZETH   5. I have discussed risks (including but not limited to vascular injury, infection, hematoma, contrast induced kidney dysfunction, CVA and MI), benefits, alternatives in detail. All questions answered. Patient agrees to proceed.        Electronically signed by NICOLE Deleon CNP on 9/13/2021 at 8:30 AM  [address]  [phone] Electrodesiccation Text: The wound bed was treated with electrodesiccation after the biopsy was performed. Consent: Written consent was obtained and risks were reviewed including but not limited to scarring, infection, bleeding, scabbing, incomplete removal, nerve damage and allergy to anesthesia. Billing Type: Third-Party Bill Biopsy Method: Dermablade Hemostasis: Drysol X Size Of Lesion In Cm: 0 Anesthesia Type: 1% lidocaine with epinephrine and a 1:10 solution of 8.4% sodium bicarbonate Post-Care Instructions: I reviewed with the patient in detail post-care instructions. Patient is to keep the biopsy site dry overnight, and then apply vaseline twice daily until healed. Patient may apply hydrogen peroxide soaks to remove any crusting. Type Of Destruction Used: Electrodesiccation and Curettage Biopsy Type: H and E Cryotherapy Text: The wound bed was treated with cryotherapy after the biopsy was performed. Detail Level: Detailed Was A Bandage Applied: Yes Electrodesiccation And Curettage Text: The wound bed was treated with electrodesiccation and curettage after the biopsy was performed. Wound Care: Vaseline Bill 17956 For Specimen Handling/Conveyance To Laboratory?: no Anesthesia Volume In Cc (Will Not Render If 0): 0.5 Depth Of Biopsy: dermis Silver Nitrate Text: The wound bed was treated with silver nitrate after the biopsy was performed. Dressing: bandage

## 2021-09-13 NOTE — PLAN OF CARE
Problem: Falls - Risk of:  Goal: Will remain free from falls  Description: Will remain free from falls  9/13/2021 0302 by Blanquita Chester RN  Outcome: Ongoing  9/13/2021 0239 by Blanquita Chester RN  Outcome: Ongoing  Goal: Absence of physical injury  Description: Absence of physical injury  9/13/2021 0302 by Blanquita Chester RN  Outcome: Ongoing  9/13/2021 0239 by Blanquita Chester RN  Outcome: Ongoing

## 2021-09-14 VITALS
HEART RATE: 76 BPM | WEIGHT: 315 LBS | OXYGEN SATURATION: 96 % | RESPIRATION RATE: 14 BRPM | BODY MASS INDEX: 42.66 KG/M2 | SYSTOLIC BLOOD PRESSURE: 135 MMHG | TEMPERATURE: 97.9 F | DIASTOLIC BLOOD PRESSURE: 57 MMHG | HEIGHT: 72 IN

## 2021-09-14 LAB
ALBUMIN SERPL-MCNC: 3.6 G/DL (ref 3.5–5.2)
ALBUMIN/GLOBULIN RATIO: 1.1 (ref 1–2.5)
ALP BLD-CCNC: 73 U/L (ref 40–129)
ALT SERPL-CCNC: 9 U/L (ref 5–41)
ANCA MYELOPEROXIDASE: <0.3 AU/ML (ref 0–3.5)
ANCA PROTEINASE 3: <0.7 AU/ML (ref 0–2)
ANION GAP SERPL CALCULATED.3IONS-SCNC: 17 MMOL/L (ref 9–17)
ANTI DNA DOUBLE STRANDED: 1.2 IU/ML
ANTI-NUCLEAR ANTIBODY (ANA): ABNORMAL
AST SERPL-CCNC: 10 U/L
BILIRUB SERPL-MCNC: 0.6 MG/DL (ref 0.3–1.2)
BUN BLDV-MCNC: 52 MG/DL (ref 6–20)
CALCIUM SERPL-MCNC: 8.9 MG/DL (ref 8.6–10.4)
CHLORIDE BLD-SCNC: 97 MMOL/L (ref 98–107)
CO2: 22 MMOL/L (ref 20–31)
CREAT SERPL-MCNC: 1.56 MG/DL (ref 0.7–1.2)
EKG ATRIAL RATE: 80 BPM
EKG P AXIS: 79 DEGREES
EKG P-R INTERVAL: 166 MS
EKG Q-T INTERVAL: 460 MS
EKG QRS DURATION: 182 MS
EKG QTC CALCULATION (BAZETT): 530 MS
EKG R AXIS: 1 DEGREES
EKG T AXIS: 107 DEGREES
EKG VENTRICULAR RATE: 80 BPM
ENA ANTIBODIES SCREEN: 0.8 U/ML
GFR AFRICAN AMERICAN: 56 ML/MIN
GFR NON-AFRICAN AMERICAN: 46 ML/MIN
GFR SERPL CREATININE-BSD FRML MDRD: ABNORMAL ML/MIN/{1.73_M2}
GFR SERPL CREATININE-BSD FRML MDRD: ABNORMAL ML/MIN/{1.73_M2}
GLUCOSE BLD-MCNC: 322 MG/DL (ref 75–110)
GLUCOSE BLD-MCNC: 351 MG/DL (ref 75–110)
GLUCOSE BLD-MCNC: 364 MG/DL (ref 70–99)
PARTIAL THROMBOPLASTIN TIME: 36.6 SEC (ref 20.5–30.5)
PHOSPHORUS: 3.6 MG/DL (ref 2.5–4.5)
POTASSIUM SERPL-SCNC: 4.4 MMOL/L (ref 3.7–5.3)
SODIUM BLD-SCNC: 136 MMOL/L (ref 135–144)
TOTAL PROTEIN: 6.9 G/DL (ref 6.4–8.3)

## 2021-09-14 PROCEDURE — 6370000000 HC RX 637 (ALT 250 FOR IP): Performed by: CLINICAL NURSE SPECIALIST

## 2021-09-14 PROCEDURE — 85730 THROMBOPLASTIN TIME PARTIAL: CPT

## 2021-09-14 PROCEDURE — 82947 ASSAY GLUCOSE BLOOD QUANT: CPT

## 2021-09-14 PROCEDURE — 6360000002 HC RX W HCPCS: Performed by: CLINICAL NURSE SPECIALIST

## 2021-09-14 PROCEDURE — 36415 COLL VENOUS BLD VENIPUNCTURE: CPT

## 2021-09-14 PROCEDURE — 6360000002 HC RX W HCPCS: Performed by: STUDENT IN AN ORGANIZED HEALTH CARE EDUCATION/TRAINING PROGRAM

## 2021-09-14 PROCEDURE — 2580000003 HC RX 258: Performed by: NURSE PRACTITIONER

## 2021-09-14 PROCEDURE — 6370000000 HC RX 637 (ALT 250 FOR IP): Performed by: INTERNAL MEDICINE

## 2021-09-14 PROCEDURE — 6370000000 HC RX 637 (ALT 250 FOR IP): Performed by: NURSE PRACTITIONER

## 2021-09-14 PROCEDURE — 2500000003 HC RX 250 WO HCPCS: Performed by: STUDENT IN AN ORGANIZED HEALTH CARE EDUCATION/TRAINING PROGRAM

## 2021-09-14 PROCEDURE — 84100 ASSAY OF PHOSPHORUS: CPT

## 2021-09-14 PROCEDURE — 94640 AIRWAY INHALATION TREATMENT: CPT

## 2021-09-14 PROCEDURE — 94660 CPAP INITIATION&MGMT: CPT

## 2021-09-14 PROCEDURE — 80053 COMPREHEN METABOLIC PANEL: CPT

## 2021-09-14 PROCEDURE — 99239 HOSP IP/OBS DSCHRG MGMT >30: CPT | Performed by: STUDENT IN AN ORGANIZED HEALTH CARE EDUCATION/TRAINING PROGRAM

## 2021-09-14 PROCEDURE — 99232 SBSQ HOSP IP/OBS MODERATE 35: CPT | Performed by: INTERNAL MEDICINE

## 2021-09-14 RX ADMIN — VENLAFAXINE HYDROCHLORIDE 75 MG: 75 CAPSULE, EXTENDED RELEASE ORAL at 08:19

## 2021-09-14 RX ADMIN — MAGNESIUM GLUCONATE 500 MG ORAL TABLET 400 MG: 500 TABLET ORAL at 08:18

## 2021-09-14 RX ADMIN — INSULIN LISPRO 8 UNITS: 100 INJECTION, SOLUTION INTRAVENOUS; SUBCUTANEOUS at 12:36

## 2021-09-14 RX ADMIN — GABAPENTIN 300 MG: 300 CAPSULE ORAL at 08:18

## 2021-09-14 RX ADMIN — BUMETANIDE 3 MG: 1 TABLET ORAL at 08:18

## 2021-09-14 RX ADMIN — Medication 81 MG: at 08:18

## 2021-09-14 RX ADMIN — HEPARIN SODIUM 2000 UNITS: 1000 INJECTION INTRAVENOUS; SUBCUTANEOUS at 07:39

## 2021-09-14 RX ADMIN — Medication 1000 UNITS: at 08:19

## 2021-09-14 RX ADMIN — METOPROLOL TARTRATE 50 MG: 50 TABLET, FILM COATED ORAL at 08:18

## 2021-09-14 RX ADMIN — LATANOPROST 1 DROP: 50 SOLUTION OPHTHALMIC at 02:18

## 2021-09-14 RX ADMIN — PANTOPRAZOLE SODIUM 40 MG: 40 TABLET, DELAYED RELEASE ORAL at 08:19

## 2021-09-14 RX ADMIN — NITROGLYCERIN 10 MCG/MIN: 20 INJECTION INTRAVENOUS at 07:34

## 2021-09-14 RX ADMIN — TIZANIDINE 4 MG: 4 TABLET ORAL at 08:19

## 2021-09-14 RX ADMIN — SODIUM CHLORIDE, PRESERVATIVE FREE 10 ML: 5 INJECTION INTRAVENOUS at 08:19

## 2021-09-14 RX ADMIN — BUDESONIDE AND FORMOTEROL FUMARATE DIHYDRATE 2 PUFF: 160; 4.5 AEROSOL RESPIRATORY (INHALATION) at 08:30

## 2021-09-14 RX ADMIN — INSULIN LISPRO 10 UNITS: 100 INJECTION, SOLUTION INTRAVENOUS; SUBCUTANEOUS at 07:37

## 2021-09-14 RX ADMIN — Medication 500 MCG: at 08:19

## 2021-09-14 RX ADMIN — HEPARIN SODIUM AND DEXTROSE 15 UNITS/KG/HR: 10000; 5 INJECTION INTRAVENOUS at 07:36

## 2021-09-14 RX ADMIN — MORPHINE SULFATE 4 MG: 4 INJECTION, SOLUTION INTRAMUSCULAR; INTRAVENOUS at 08:18

## 2021-09-14 RX ADMIN — ALLOPURINOL 100 MG: 100 TABLET ORAL at 08:18

## 2021-09-14 RX ADMIN — FERROUS SULFATE TAB EC 325 MG (65 MG FE EQUIVALENT) 325 MG: 325 (65 FE) TABLET DELAYED RESPONSE at 08:18

## 2021-09-14 RX ADMIN — CLOPIDOGREL 75 MG: 75 TABLET, FILM COATED ORAL at 08:18

## 2021-09-14 RX ADMIN — ESCITALOPRAM OXALATE 10 MG: 10 TABLET ORAL at 08:20

## 2021-09-14 ASSESSMENT — PAIN DESCRIPTION - PROGRESSION

## 2021-09-14 ASSESSMENT — PAIN SCALES - GENERAL
PAINLEVEL_OUTOF10: 10
PAINLEVEL_OUTOF10: 8
PAINLEVEL_OUTOF10: 10
PAINLEVEL_OUTOF10: 10

## 2021-09-14 NOTE — PLAN OF CARE
Problem: Falls - Risk of:  Goal: Will remain free from falls  Description: Will remain free from falls  Outcome: Ongoing  Goal: Absence of physical injury  Description: Absence of physical injury  Outcome: Ongoing     Problem: Pain:  Goal: Pain level will decrease  Description: Pain level will decrease  Outcome: Ongoing  Goal: Control of acute pain  Description: Control of acute pain  Outcome: Ongoing  Goal: Control of chronic pain  Description: Control of chronic pain  Outcome: Ongoing     Problem: Gas Exchange - Impaired:  Goal: Levels of oxygenation will improve  Description: Levels of oxygenation will improve  9/14/2021 1023 by Tavares Dixon RN  Outcome: Ongoing  9/14/2021 0635 by Suman Villalpando RCP  Outcome: Ongoing  9/14/2021 0635 by Suman Villalpando RCP  Outcome: Ongoing     Problem: Skin Integrity:  Goal: Will show no infection signs and symptoms  Description: Will show no infection signs and symptoms  Outcome: Ongoing  Goal: Absence of new skin breakdown  Description: Absence of new skin breakdown  Outcome: Ongoing     Problem: Respiratory  Intervention: Respiratory assessment  9/14/2021 0635 by Suman Villalpando RCP  Note:   PROVIDE ADEQUATE OXYGENATION WITH ACCEPTABLE SP02/ABG'S    []  IDENTIFY APPROPRIATE OXYGEN THERAPY  []   MONITOR SP02/ABG'S AS NEEDED   []   PATIENT EDUCATION AS NEEDED

## 2021-09-14 NOTE — OP NOTE
[facility]  Cardiac catheterization note        Date:   9/13/2021  Patient name: Jinny Lopez. Date of admission:  9/11/2021 12:01 AM  MRN:   [id number]  YOB: 1965    Operators:  Dyan Acosta MD (Attending Physician)     Kishore Butt                                     (CV Fellow)      Pre Procedure Conscious Sedation Data:    ASA Class:    [] I [x] II [] III [] IV    Mallampati Class:  [] I [x] II [] III [] IV      Indications:  1. Recurrent Chest pain   2. Elevated troponins concerning for NSTEMI    Procedure:   1. Left heart catheterization   2. Selective left and right coronary angiography       Access:  [] Femoral  [x] Radial  artery       [x] Right  [] Left    Procedure: After informed consent was obtained with explanation of the risks and benefits, patient was brought to the cath lab. The access area was prepped and draped in sterile fashion. 1% lidocaine was used for local block. The artery was cannulated with 6  Fr sheath with brisk arterial blood return. The side port was frequently flushed and aspirated with normal saline. Findings:  LMCA: Short with separate ostia of LCX/LAD     LAD: Mid 20-30% stenosis   Diag is small, patent with mild disease     LCx: Mild irregularities 10-20%. Large and co-dominant vessel, patent with   10-20% stenosis   OMs are small and patent   LPDA/LPL are patent with mild disease     RCA: Mild irregularities 10-20%. Co-dominant vessel. RPDA is large and patent with mild disease      Coronary Tree        Dominance: Left     Procedure Summary        1. Mild non-obstructive CAD    2. No change from last angiogram in 2018    3. LV gram not done due to renal insufficiency        Recommendations        Routine Post Diagnostic Cath orders.    optimize medical therapy    Risk factor modification.        Estimated Blood Loss:            [] Minimal 10 cc   [x] Minimal < 25 cc      [] Moderate 25-50 cc         [] >50 cc      Electronically signed

## 2021-09-14 NOTE — PLAN OF CARE
Problem: Gas Exchange - Impaired:  Goal: Levels of oxygenation will improve  Description: Levels of oxygenation will improve  9/14/2021 0635 by Phil Bae RCP  Outcome: Ongoing     Problem: Gas Exchange - Impaired:  Goal: Levels of oxygenation will improve  Description: Levels of oxygenation will improve  9/14/2021 0635 by Phil Bae RCP  Outcome: Ongoing     Problem: Respiratory  Intervention: Respiratory assessment  Note:   PROVIDE ADEQUATE OXYGENATION WITH ACCEPTABLE SP02/ABG'S    [x]  IDENTIFY APPROPRIATE OXYGEN THERAPY  [x]   MONITOR SP02/ABG'S AS NEEDED   [x]   PATIENT EDUCATION AS NEEDED

## 2021-09-14 NOTE — CARE COORDINATION
Discharge [id number] [facility]  Clinical Case Management Department  Written by: Willi Aldana RN    Patient Name: Mustapha Gomez.   Attending Provider: No att. providers found  Admit Date: 2021 12:01 AM  MRN: [id number]  Account: [de-identified]                     : 1965  Discharge Date: 2021      Disposition: home    Willi Aldana RN

## 2021-09-14 NOTE — PROGRESS NOTES
Renal Progress Note    Patient :  Damaso Espinoza.; 63 y.o. MRN# [id number]  Location:  2016/2016-01  Attending:  Mega Segundo MD  Admit Date:  9/11/2021   Hospital Day: 3      Subjective:       HPI: 64 yo male with new onset LBBB undergoing cardiac catheterization. Baseline CKD stage III, creatinine 1.6-1.9. nephrology consulted for clearance for cardiac catheterization requiring IV contrast.    Patient was seen and examined. No acute events overnight. No chest pain or SOB. Afebrile. VS stable. Was hypertensive this am, prior to receiving morning meds. Later improved. Underwent cardiac cath yesterday, mild non-obstructive CAD. Received IV fluids and Mucomyst.      Labs reviewed, Na 136, K 4.4, chloride 97, creatinine 1.88-->1.72-->1.56, creatinine was 2.23 at time of admission. BUN 52  Calcium 8.9, phos 3.6, albumin 3.6    LIZETH workup: SPEP negative for monoclonal immunoglobulin, normal kappa/ lambda ratio, elevated kappa as well as lambda light chains. Hepatitis panel negative. urine chemistries wnl. ANCA negative. PREMA equivocal, can be retested during follow up if needed. Outpatient Medications:     Medications Prior to Admission: oxyCODONE HCl (OXY-IR) 10 MG immediate release tablet, Take 1 tablet by mouth every 8 hours as needed for Pain for up to 30 days. predniSONE (DELTASONE) 10 MG tablet, Take 4 tabs X 3 days, then 3 tabs X 3 days, then 2 tabs X 3 days, then 1 tab X 3 days  allopurinol (ZYLOPRIM) 100 MG tablet, Take 1 tablet by mouth daily FOR GOUT.  STOP IT IF ANY RASH DEVELOPS!  vitamin D3 (CHOLECALCIFEROL) 25 MCG (1000 UT) TABS tablet, Take 1 tablet by mouth daily  docusate sodium (COLACE) 100 MG capsule, Take 1 capsule by mouth 2 times daily For constipation  latanoprost (XALATAN) 0.005 % ophthalmic solution, 1 drop nightly  gabapentin (NEURONTIN) 300 MG capsule, Take 1 po bid  butalbital-acetaminophen-caffeine (FIORICET, ESGIC) -40 MG per tablet, Take 1 tablet by mouth every 8 hours as needed for Headaches  clopidogrel (PLAVIX) 75 MG tablet, Take 1 tablet by mouth daily  nitroGLYCERIN (NITROSTAT) 0.4 MG SL tablet, DISSOLVE 1 TAB UNDER TONGUE FOR CHEST PAIN - IF PAIN REMAINS AFTER 5 MIN, CALL 911 AND REPEAT DOSE. MAX 3 TABS IN 15 MINUTES  pantoprazole (PROTONIX) 40 MG tablet, Take 1 tablet by mouth every morning (before breakfast)  Insulin Syringe-Needle U-100 31G X 5/16\" 1 ML MISC, Use to subcutaneously inject insulin three times daily  insulin regular human (HUMULIN R) 500 UNIT/ML concentrated injection vial, Patient using 0.52ml with breakfast, 0.52ml with lunch and 0.45ml with dinner. escitalopram (LEXAPRO) 10 MG tablet, Take 1 tablet by mouth daily  Continuous Blood Gluc Sensor (FREESTYLE CRISTINE 14 DAY SENSOR) MISC, Use one sensor every 14 days  rosuvastatin (CRESTOR) 10 MG tablet, Take 1 tablet by mouth nightly Stop pravastatin  Continuous Blood Gluc Sensor (FREESTYLE CRISTINE 2 SENSOR) [facility] [place] apply a new sensor every 14 days. RX to cover voucher patient will bring in.  ammonium lactate (LAC-HYDRIN) 12 % cream, Apply topically to dry skin BID.  sulfacetamide (BLEPH-10) 10 % ophthalmic solution,   mupirocin (BACTROBAN) 2 % ointment, Apply topically 2 times daily on the affected area for 7-10 days. OK to substitute to cream  Gauze Pads & Dressings 4\"X4\" PADS, Twice a day dressing of right leg wound  Gauze Bandages (ROLLED GAUZE BANDAGE 4\"X2. 5YD) MISC, For twice a day dressing  PROAIR  (90 Base) MCG/ACT inhaler, INHALE TWO PUFFS BY MOUTH EVERY 6 HOURS AS NEEDED FOR WHEEZING OR FOR SHORTNESS OF BREATH  amitriptyline (ELAVIL) 50 MG tablet, Take 2 po qhs  nystatin (MYCOSTATIN) 702516 UNIT/GM powder, Apply 2 times daily in the skin folds for several months  tiZANidine (ZANAFLEX) 4 MG tablet, TAKE ONE TABLET BY MOUTH EVERY 8 HOURS AS NEEDED FOR BACK PAIN  bumetanide (BUMEX) 1 MG tablet, TAKE THREE TABLETS BY MOUTH TWICE A DAY  spironolactone (ALDACTONE) 50 MG tablet, Take 1 tablet by mouth daily  ammonium lactate (LAC-HYDRIN) 12 % lotion, Apply topically daily. For dry skin  clobetasol (TEMOVATE) 0.05 % ointment, Apply topically 2 times daily for psoriasis  ketoconazole (NIZORAL) 2 % cream, Apply twice a day for yeast infection in the skin folds, for 4 weeks  albuterol (PROVENTIL) (2.5 MG/3ML) 0.083% nebulizer solution, Take 3 mLs by nebulization every 6 hours as needed for Wheezing or Shortness of Breath  Ferrous Sulfate (IRON) 325 (65 Fe) MG TABS, Take 1 tablet by mouth daily  metoprolol tartrate (LOPRESSOR) 50 MG tablet, Take 1 tablet by mouth 2 times daily  magnesium oxide (MAG-OX) 400 (240 Mg) MG tablet, Take 1 tablet by mouth daily  lisinopril (PRINIVIL;ZESTRIL) 5 MG tablet, Take 1 tablet by mouth daily . Discontinued Lisinopril  10 mg  fluticasone-salmeterol (ADVAIR HFA) 230-21 MCG/ACT inhaler, Inhale 2 puffs into the lungs 2 times daily  tiotropium (SPIRIVA RESPIMAT) 2.5 MCG/ACT AERS inhaler, Inhale 2 puffs into the lungs daily  venlafaxine (EFFEXOR XR) 75 MG extended release capsule, Take 1 capsule by mouth daily Take with food  naloxone 4 MG/0.1ML LIQD nasal spray, 1 spray by Nasal route as needed for Opioid Reversal Due to COPD and sleep apnea, this is a big recommendation for you  blood glucose monitor strips, Test 3 times a day & as needed for symptoms of irregular blood glucose. Dispense sufficient amount for indicated testing frequency plus additional to accommodate PRN testing needs. One touch Ultra blue  Lancets MISC, Use to check blood sugar three times daily along with when necessary due to symptoms. vitamin B-12 (CYANOCOBALAMIN) 500 MCG tablet, Take 1 tablet by mouth daily  Oxygen Tubing MISC, by Does not apply route DX COPD. chronic respiratory failure  Respiratory Therapy Supplies (NEBULIZER/TUBING/MOUTHPIECE) KIT, Dx COPD needs nebulizer supplies  ONE TOUCH ULTRASOFT LANCETS MISC, Patient to test blood sugar up to 4 times daily.   Lancet Devices (LANCING DEVICE) MISC, Provide patient with lancing device appropriate for his machine/lancing needles. Lidocaine 4 % LOTN, Apply topically  Spacer/Aero Chamber Mouthpiece MISC, 1 each by Does not apply route once as needed (to be used with his inhalers)  metolazone (ZAROXOLYN) 2.5 MG tablet, Take 2.5 mg by mouth three times a week MWF  Handicap Placard MISC, by Does not apply route Can't walk greater than 200 feet. Expires in 5 years. fluticasone (CUTIVATE) 0.05 % cream, Apply topically 2 times daily   fluticasone (FLONASE) 50 MCG/ACT nasal spray, 2 sprays by Nasal route daily (Patient taking differently: 2 sprays by Nasal route daily as needed (sinus symptoms) )  Melatonin 10 MG TABS, Take 10 mg by mouth nightly as needed (insomnia)  aspirin 81 MG EC tablet, Take 81 mg by mouth daily.     Current Medications:     Scheduled Meds:    acetylcysteine  1,200 mg Oral BID    [Held by provider] metOLazone  2.5 mg Oral Once per day on Mon Wed Fri    vitamin B-12  500 mcg Oral Daily    venlafaxine  75 mg Oral Daily    tiotropium  2 puff Inhalation Daily    [Held by provider] lisinopril  5 mg Oral Daily    metoprolol tartrate  50 mg Oral BID    magnesium oxide  400 mg Oral Daily    ferrous sulfate  325 mg Oral Daily    [Held by provider] spironolactone  50 mg Oral Daily    rosuvastatin  10 mg Oral Nightly    escitalopram  10 mg Oral Daily    pantoprazole  40 mg Oral QAM AC    clopidogrel  75 mg Oral Daily    latanoprost  1 drop Both Eyes Nightly    docusate sodium  100 mg Oral BID    Vitamin D  1,000 Units Oral Daily    allopurinol  100 mg Oral Daily    amitriptyline  100 mg Oral Nightly    gabapentin  300 mg Oral BID    sodium chloride flush  5-40 mL IntraVENous 2 times per day    bumetanide  3 mg Oral Daily    insulin lispro  0-12 Units SubCUTAneous TID WC    insulin lispro  0-6 Units SubCUTAneous Nightly    budesonide-formoterol  2 puff Inhalation BID    aspirin  81 mg Oral Daily     Continuous Infusions:    dextrose  sodium chloride Stopped (21 0119)    sodium chloride       PRN Meds:  glucose, dextrose, glucagon (rDNA), dextrose, melatonin, fluticasone, butalbital-acetaminophen-caffeine, tiZANidine, sodium chloride flush, sodium chloride, potassium chloride **OR** potassium alternative oral replacement **OR** potassium chloride, magnesium sulfate, ondansetron **OR** ondansetron, acetaminophen **OR** acetaminophen, magnesium hydroxide, nitroGLYCERIN, morphine **OR** morphine, albuterol    Input/Output:       I/O last 3 completed shifts: In: 60 [P.O.:60]  Out: 425 [Urine:425]. Patient Vitals for the past 96 hrs (Last 3 readings):   Weight   21 2000 (!) 406 lb 12 oz (184.5 kg)   21 1756 (!) 402 lb 9.6 oz (182.6 kg)   21 0004 (!) 418 lb (189.6 kg)       Vital Signs:   Temperature:  Temp: 98.6 °F (37 °C)  TMax:   Temp (24hrs), Av.4 °F (36.9 °C), Min:97.7 °F (36.5 °C), Max:99 °F (37.2 °C)    Respirations:  Resp: 13  Pulse:   Pulse: 78  BP:    BP: (!) 167/63  BP Range: Systolic (80MDX), JWI:070 , Min:119 , ELY:621       Diastolic (69CYU), FYS:30, Min:59, Max:80      Physical Examination:     General:  AAO x 3, speaking in full sentences, no accessory muscle use. HEENT: Atraumatic, normocephalic, no throat congestion, moist mucosa. Eyes:   Pupils equal, round and reactive to light, EOMI. Neck:   Supple  Chest:   Bilateral vesicular breath sounds, no rales or wheezes. Cardiac:  S1 S2 RR, no murmurs, gallops or rubs. Abdomen: Soft, non-tender, no masses or organomegaly, BS audible. Morbidly obese  :   No suprapubic or flank tenderness. Neuro:  AAO x 3, No FND. SKIN:  No rashes, good skin turgor.   Extremities:  +1 b/l pedal nonpitting edema, discoloration of extremities    Labs:       Recent Labs     21  0624   WBC 10.3   RBC 3.85*   HGB 10.2*   HCT 34.1*   MCV 88.6   MCH 26.5   MCHC 29.9   RDW 15.4*      MPV 11.3      BMP:   Recent Labs     21  1047 21  1047 09/12/21  1050 09/13/21  0624 09/14/21  0550     --   --  134* 136   K 4.7  --   --  4.4 4.4   CL 96*  --   --  95* 97*   CO2 26  --   --  24 22   BUN 70*  --   --  68* 52*   CREATININE 1.88*  --   --  1.72* 1.56*   GLUCOSE 236*   < > 247 304* 364*   CALCIUM 9.5  --   --  9.3 8.9    < > = values in this interval not displayed. Phosphorus:     Recent Labs     09/13/21  0624 09/14/21  0550   PHOS 4.1 3.6     Magnesium:    Recent Labs     09/12/21  1047   MG 2.3     Albumin:    Recent Labs     09/12/21  1047 09/13/21  0624 09/14/21  0550   LABALBU 4.1 3.7 3.6     BNP:      Lab Results   Component Value Date    BNP 15 09/11/2013     PREMA:      Lab Results   Component Value Date    PREMA  05/02/2017     This test is equivocal, which represents a borderline PREMA result. Suggest     SPEP:  Lab Results   Component Value Date    PROT 6.9 09/14/2021    ALBCAL 4.4 09/12/2021    ALBPCT 58 09/12/2021    LABALPH 0.2 09/12/2021    LABALPH 0.9 09/12/2021    A1PCT 3 09/12/2021    A2PCT 12 09/12/2021    LABBETA 0.9 09/12/2021    BETAPCT 12 09/12/2021    GAMGLOB 1.1 09/12/2021    GGPCT 15 09/12/2021    PATH ELECTRONICALLY SIGNED.  Leilani Kehr, M.D. 09/12/2021     UPEP:     Lab Results   Component Value Date    LABPE NORMAL ELECTROPHORETIC PATTERN 09/12/2021     C3:     Lab Results   Component Value Date    C3 170 09/12/2021     C4:     Lab Results   Component Value Date    C4 31 09/12/2021     MPO ANCA:     Lab Results   Component Value Date    MPO 7 05/02/2017     PR3 ANCA:     Lab Results   Component Value Date    PR3 7 05/02/2017     Anti-GBM:     Lab Results   Component Value Date    GBMABIGG 8 08/30/2013     Hep BsAg:         Lab Results   Component Value Date    HEPBSAG NONREACTIVE 09/12/2021     Hep C AB:          Lab Results   Component Value Date    HEPCAB NONREACTIVE 09/12/2021       Urinalysis/Chemistries:      Lab Results   Component Value Date    NITRU NEGATIVE 09/12/2021    COLORU YELLOW 09/12/2021    PHUR 6.0 09/12/2021    WBCUA None 02/17/2016    RBCUA 0 TO 2 02/17/2016    MUCUS NOT REPORTED 02/17/2016    TRICHOMONAS NOT REPORTED 02/17/2016    YEAST NOT REPORTED 02/17/2016    BACTERIA NOT REPORTED 02/17/2016    CLARITYU Clear 10/31/2018    SPECGRAV 1.010 09/12/2021    LEUKOCYTESUR NEGATIVE 09/12/2021    UROBILINOGEN Normal 09/12/2021    BILIRUBINUR NEGATIVE 09/12/2021    BILIRUBINUR neg 06/30/2016    BLOODU Negative 10/31/2018    GLUCOSEU NEGATIVE 09/12/2021    KETUA NEGATIVE 09/12/2021    AMORPHOUS NOT REPORTED 02/17/2016     Urine Sodium:     Lab Results   Component Value Date    MARIA L 84 09/12/2021     Urine Potassium:    Lab Results   Component Value Date    KUR 28.4 07/10/2017     Urine Chloride:    Lab Results   Component Value Date    CLUR 73 09/12/2021     Urine Osmolarity:   Lab Results   Component Value Date    OSMOU 363 02/17/2016     Urine Creatinine:     Lab Results   Component Value Date    LABCREA 38.5 01/20/2021     Radiology:     Reviewed. Assessment:       1. LIZETH secondary to ATN from diuretic use worsened by NSTEMI and demand ischemia, now improved back to baseline. Patient underwent cardiac catheterization yesterday, mild, non-obstructive CAD. Renal function remains at baseline. 2. CKD stage III secondary to hypertension and diabetic nephropathy, follows with Dr. Moon Frances as outpatient, last appt was 10/31/18. CRT 1.5 at that time. Now baseline creatinine seems to be around 1.6 to 1.9 mg/DL. His Bumex was increased to 3 mg twice daily and his Zaroxolyn was increased to 2.5 mg daily as needed  2. Essential hypertension  3. NSTEMI with significant cardiac history including stent placement in the past.  Underwent cardiac cath on 9/13, mild non obstructive CAD. 4. CHF echo from 3/25/2021 showing an ejection fraction of greater than 55% without significant structural or valvular abnormality  5. ROBERT  6. Diabetes mellitus type 2 with neuropathy  7.  End-stage COPD on low flow nasal cannula at home as needed  8. history of CAD status post stent placement  9. Morbid obesity with BARBY on home CPAP  10. Peripheral non-pitting edema with generalized anasarca  11. Chronic gout on maintenance allopurinol  12. Depression with anxiety    Plan:   1. Renal ultrasound reviewed, no obstruction identified  2. Resume oral bumex, and lisinopril. Hold metolazone and can restart as outpatient if needed after he sees his nephrologist.    3. IV fluids discontinued. 4. Patient okay to be discharged from Nephrology stand point. BMP to be checked after 1 week post discharge, with results to be sent to Dr. Lotus Daily, his nephrologist. Simon Negro to be discharged with home dose of Bumex, lisinopril, aldactone. He will have to follow up with his nephrologist in 2-3 weeks of discharge. Nutrition   Please ensure that patient is on a renal diet/TF. Avoid nephrotoxic drugs/contrast exposure. We will continue to follow along with you. Bing Zavala  PGY-2  Internal Medicine  [address]  9/14/2021 12:24 PM     Attending Physician Statement  I have discussed the care of this patient, including pertinent history and exam findings, with the Resident/CNP. I have reviewed and edited the key elements of all parts of the encounter with the Resident/CNP. I agree with the assessment, plan and orders as documented by the Resident/CNP. Pj Herrera MD   Nephrology [address] [place] [address] Drive    This note is created with the assistance of a speech-recognition program. While intending to generate a document that actually reflects the content of the visit, no guarantees can be provided that every mistake has been identified and corrected by editing.

## 2021-09-14 NOTE — DISCHARGE INSTR - DIET
 Good nutrition is important when healing from an illness, injury, or surgery. Follow any nutrition recommendations given to you during your hospital stay.  If you were given an oral nutrition supplement while in the hospital, continue to take this supplement at home. You can take it with meals, in-between meals, and/or before bedtime. These supplements can be purchased at most local grocery stores, pharmacies, and chain super-stores.  If you have any questions about your diet or nutrition, call the hospital and ask for the dietitian. Diabetic Renal Diet: Care Instructions  Your Care Instructions     You may already be spreading carbohydrate throughout your daily meals. When you also have kidney disease, you need to avoid foods that make your kidneys worse. Keep your blood sugar and blood pressure as near normal as you can to reduce your chance of kidney failure. Your doctor and dietitian will help you make an eating plan. It will be based on your body weight, size, and medical condition. You may need to limit salt, fluids, and protein. You also may need to limit minerals such as potassium and phosphorus. It takes planning, but there are plenty of tasty, healthy foods you can eat. Always talk with your doctor or dietitian before you make changes in your diet. Follow-up care is a key part of your treatment and safety. Be sure to make and go to all appointments, and call your doctor if you are having problems. It's also a good idea to know your test results and keep a list of the medicines you take. How can you care for yourself at home? · Work with your doctor or dietitian to create a food plan that guides your daily food choices. · Do not skip meals or go for many hours without eating. · You can use margarine, mayonnaise, and oil to add calories to your diet for energy. The healthiest oils are olive, canola, and safflower oils.   · Talk to your dietitian about eating sweets, including honey and sugar.  · Be safe with medicines. Take your medicines exactly as prescribed. Call your doctor if you think you are having a problem with your medicine. You will get more details on the specific medicines your doctor prescribes. · Do not take any other medicine without talking to your doctor first. This includes over-the-counter medicines, vitamins, and herbal products. · Limit alcohol to no more than 1 drink per day. Count it as part of your fluid allowance. To get the right amount of protein  · Ask your doctor or dietitian how much protein you can have each day. You need some protein to stay healthy. · Include all sources of protein in your daily protein count. Besides meat, poultry, and fish, protein is found in milk and milk products, beans, peas, lentils, nuts, seeds, tofu, and eggs. Check for protein on the nutrition facts label found on packages of food such as bread and cereal.  To limit salt  · Do not add salt to your food. And look for \"reduced salt\" or \"low sodium\" on labels. · Do not use a salt substitute or lite salt unless your doctor says it is okay. (These products are high in potassium.)  · Avoid or use very small amounts of condiments and marinades. These include soy sauce, fish sauce, and barbecue sauce. They are high in sodium. · Avoid salted pretzels, chips, and other salted snacks. · Check food labels to become more aware of the sodium content of foods. Foods that are high in sodium include soups; many canned foods; cured, smoked, or dried meats; and many packaged foods. To control carbohydrate  · Ask your dietitian how much carbohydrate you can have. Carbohydrate foods include:  ? Whole-grain and refined breads and cereals, and some vegetables such as peas and beans. ? Fruits, milk, and milk products (except cheese). ? Candy, table sugar, and regular carbonated drinks. To limit fluids  · Know what your fluid allowance is. Fill a pitcher with that amount of water every day.  If you drink another fluid (such as coffee) that day, pour an equal amount out of the pitcher. · Foods that are liquid at room temperature count as fluids. These include ice, gelatin, ice pops, and ice cream.  To limit potassium  · Fruits that are low in potassium include blueberries and raspberries. · Vegetables that are low in potassium include cucumber and radishes. To limit phosphorus  · Follow your doctor's or dietitian's plan for your limit on milk and milk products in your diet. · Avoid nuts, peanut butter, seeds, lentils, beans, organ meats, and sardines. · Avoid cola drinks. · Avoid bran breads or bran cereals. They are high in phosphorus. Where can you learn more? Go to [email or web address] org and sign in to your [employer] account. Enter R464 in the [employer] box to learn more about \"Diabetic Renal Diet: Care Instructions. \"     If you do not have an account, please click on the \"Sign Up Now\" link. Current as of: August 31, 2020               Content Version: 12.9  © 8828-5566 [employer]wise, Inc[employer]. Care instructions adapted under license by [facility] ([facility]). If you have questions about a medical condition or this instruction, always ask your healthcare professional. Gregg Rangel any warranty or liability for your use of this information.

## 2021-09-14 NOTE — CARE COORDINATION
Transitional planning-talked with patient, plan is to go home with his wife, not wanting any home care at this time, has ride

## 2021-09-14 NOTE — DISCHARGE SUMMARY
[facility]  Office: [phone]  Carol Mandie, DO, Ronald Smoker, DO, Jared Lu, DO, Janis Farr Blood, DO, Colonel Richard MD, Windy Ross MD, Priscilla Marrero MD, Keila Munoz MD, Jimbo Yarbrough MD, Michael Delcid MD, Lilian Tyson MD, Kym Chen, DO, Kelvin Lucio, DO, Carmela Almaguer MD,  Kev Mcfarland, DO, Chong Correa MD, Mary Walker MD, Amy Monreal MD, Arleen Melendez MD, , Hazel Lopez MD, Dmitry Rodriguez MD, Ely Galeano MD, Prudencio Valdivia, [facility], [facility], CNP, Briseida Bee, CNP, Nilam Watson, CNS, Song Rosales, CNP, Marino Laguna, CNP, Delana Spatz, CNP, Dayan Diallo, CNP, Sean Watt, CNP, Hermila Benjamin, PA-C, Lizzy Castelan, [facility], Bettye Eduardo, CNP, Adrian Aquino, CNP, Ai Craft, CNP, Janell Elias, CNP, Danie Tom, CNP, Luis Alfredo Brady, CNP, Edie Chin, CNP, Maggie Mendoza, [facility]    Discharge Summary     Patient ID: Kwaku Gtz. :  1965   MRN: [id number]     ACCOUNT:  [de-identified]   Patient's PCP: Artur Garcia MD  Admit Date: 2021   Discharge Date: 2021     Length of Stay: 3  Code Status:  DNR-CCA  Admitting Physician: No admitting provider for patient encounter.   Discharge Physician: Amy Monreal MD     Active Discharge Diagnoses:     Hospital Problem Lists:  Principal Problem:    Unstable angina [facility])  Active Problems:    Hypertriglyceridemia    LIZETH (acute kidney injury) ([facility]    CAD (coronary artery disease)    Chronic obstructive pulmonary disease (HCC)    Type 2 diabetes mellitus with diabetic polyneuropathy, with long-term current use of insulin (HCC)    Stage 3b chronic kidney disease ([facility]    Essential hypertension    Uncontrolled type 2 diabetes mellitus with hyperglycemia ([facility])    Chronic diastolic congestive heart failure ([facility]    Morbid obesity with BMI of 50.0-59.9, adult (HCC)    Hepatic steatosis    Chronic back pain greater than 3 months duration    Gastroesophageal reflux disease without esophagitis    Iron deficiency anemia due to chronic blood loss    BARBY treated with BiPAP    Bilateral hearing loss    Chronic respiratory failure with hypoxia (HCC)    Venous insufficiency of both lower extremities    Chronic gout due to renal impairment without tophus    Anemia, normocytic normochromic  Resolved Problems:    * No resolved hospital problems. *      Admission Condition:  fair     Discharged Condition: good    Hospital Stay:     Hospital Course:  Tyree Mesa is a 63 y.o. male who was admitted for the management of   Unstable angina [facility]) , presented to ER with Chest Pain  68-year-old male with known history of coronary artery disease with stents in 2016 and 18 presented to the hospital with severe substernal chest pain radiating to neck and left shoulder. Patient took his nitro at home without relief. Came to the hospital.  Patient had troponin elevation around 45, 43, 40. Cardiology was consulted. Patient was started on nitro and heparin drip. Patient underwent heart cath on 9/13. He had no new obstructive CAD. No changes from prior cath in 2018. Cardiology recommended aspirin, Plavix, statin and beta-blocker. Nephrology was on board given patient had LIZETH on CKD. Nephrology recommended to continue home Bumex, Aldactone, lisinopril but discontinue metolazone on discharge. BMP in 1 week and follow-up with nephrologist.  Patient vitals were stable for discharge. He will follow-up with cardiology in 2 weeks.     Exam on discharge  Patient is alert and oriented  Clear to auscultation bilaterally  Regular rate and rhythm  No abdominal tenderness, no organomegaly  No pedal edema      Significant therapeutic interventions: 9/13  Procedure Summary        1. Mild non-obstructive CAD    2. No change from last angiogram in 2018    3. LV gram not done due to renal insufficiency        Recommendations        Routine Post Diagnostic Cath orders.    optimize medical therapy    Risk factor modification. Significant Diagnostic Studies:   Labs / Micro:  CBC:   Lab Results   Component Value Date    WBC 10.3 09/13/2021    RBC 3.85 09/13/2021    HGB 10.2 09/13/2021    HCT 34.1 09/13/2021    MCV 88.6 09/13/2021    MCH 26.5 09/13/2021    MCHC 29.9 09/13/2021    RDW 15.4 09/13/2021     09/13/2021     BMP:    Lab Results   Component Value Date    GLUCOSE 364 09/14/2021     09/14/2021    K 4.4 09/14/2021    CL 97 09/14/2021    CO2 22 09/14/2021    ANIONGAP 17 09/14/2021    BUN 52 09/14/2021    CREATININE 1.56 09/14/2021    BUNCRER NOT REPORTED 09/12/2021    CALCIUM 8.9 09/14/2021    LABGLOM 46 09/14/2021    GFRAA 56 09/14/2021    GFR      09/14/2021    GFR NOT REPORTED 09/14/2021     HFP:    Lab Results   Component Value Date    PROT 6.9 09/14/2021     CMP:    Lab Results   Component Value Date    GLUCOSE 364 09/14/2021     09/14/2021    K 4.4 09/14/2021    CL 97 09/14/2021    CO2 22 09/14/2021    BUN 52 09/14/2021    CREATININE 1.56 09/14/2021    ANIONGAP 17 09/14/2021    ALKPHOS 73 09/14/2021    ALT 9 09/14/2021    AST 10 09/14/2021    BILITOT 0.60 09/14/2021    LABALBU 3.6 09/14/2021    ALBUMIN 1.1 09/14/2021    LABGLOM 46 09/14/2021    GFRAA 56 09/14/2021    GFR      09/14/2021    GFR NOT REPORTED 09/14/2021    PROT 6.9 09/14/2021    CALCIUM 8.9 09/14/2021        Radiology:  CT CHEST WO CONTRAST    Result Date: 9/11/2021  The IV was lost and contrast could not be given. Dissection therefore cannot be excluded. This noncontrast exam is normal for age. Findings discussed with the ordering physician 09/11/2021 at 0321 hours. US RENAL COMPLETE    Result Date: 9/12/2021  1. Limited visualization of the right kidney grossly unremarkable. No right-sided hydronephrosis. 2. Left kidney was difficult to properly visualize due to patient's body habitus. 3. Empty urinary bladder.      XR CHEST PORTABLE    Result Date: 9/11/2021  Right basilar opacity may represent airspace disease or superimposed shadows on this portable study obtained on an obese patient. When the patient is able, a follow-up PA and lateral examination of the chest would be helpful. Consultations:    Consults:     Final Specialist Recommendations/Findings:   IP CONSULT TO CARDIOLOGY  IP CONSULT TO HOSPITALIST  IP CONSULT TO FAMILY MEDICINE  IP CONSULT TO CARDIOLOGY  IP CONSULT TO NEPHROLOGY      The patient was seen and examined on day of discharge and this discharge summary is in conjunction with any daily progress note from day of discharge. Discharge plan:     Disposition: Home    Physician Follow Up:     NICOLE Titus - CNP  [address] [facility] [address]  [phone]    On 9/28/2021   9:45am Cardiology follow up post cath    Emmie Rosenthal MD  [address]  [address] [place] [address]  [address] [address]  [phone]    In 1 week      [place], [place]  [address]  [place] [address]  [phone]    Schedule an appointment as soon as possible for a visit in 1 week  follow up with bmp       Requiring Further Evaluation/Follow Up POST HOSPITALIZATION/Incidental Findings: follow up bmp in 1 week    Diet: cardiac diet    Activity: As tolerated    Instructions to Patient: d/c metalozone  Continue rest of the home medications  Follow up with bmp in 1 week with nephrologist    Discharge Medications:      Medication List      CHANGE how you take these medications    ammonium lactate 12 % cream  Commonly known as: Lac-Hydrin  Apply topically to dry skin BID. What changed: Another medication with the same name was removed. Continue taking this medication, and follow the directions you see here.      fluticasone 50 MCG/ACT nasal spray  Commonly known as: Flonase  2 sprays by Nasal route daily  What changed:   when to take this  reasons to take this        CONTINUE taking these medications    * albuterol (2.5 MG/3ML) 0.083% nebulizer solution  Commonly known as: PROVENTIL  Take 3 mLs by nebulization every 6 hours as needed for Wheezing or Shortness of Breath     * ProAir  (90 Base) MCG/ACT inhaler  Generic drug: albuterol sulfate HFA  INHALE TWO PUFFS BY MOUTH EVERY 6 HOURS AS NEEDED FOR WHEEZING OR FOR SHORTNESS OF BREATH     allopurinol 100 MG tablet  Commonly known as: Zyloprim  Take 1 tablet by mouth daily FOR GOUT. STOP IT IF ANY RASH DEVELOPS!     amitriptyline 50 MG tablet  Commonly known as: ELAVIL  Take 2 po qhs     aspirin 81 MG EC tablet     blood glucose test strips  Test 3 times a day & as needed for symptoms of irregular blood glucose. Dispense sufficient amount for indicated testing frequency plus additional to accommodate PRN testing needs. One touch Ultra blue     bumetanide 1 MG tablet  Commonly known as: BUMEX  TAKE THREE TABLETS BY MOUTH TWICE A DAY     butalbital-acetaminophen-caffeine -40 MG per tablet  Commonly known as: FIORICET, ESGIC  Take 1 tablet by mouth every 8 hours as needed for Headaches     clobetasol 0.05 % ointment  Commonly known as: TEMOVATE  Apply topically 2 times daily for psoriasis     clopidogrel 75 MG tablet  Commonly known as: PLAVIX  Take 1 tablet by mouth daily     docusate sodium 100 MG capsule  Commonly known as: Colace  Take 1 capsule by mouth 2 times daily For constipation     escitalopram 10 MG tablet  Commonly known as: Lexapro  Take 1 tablet by mouth daily     fluticasone 0.05 % cream  Commonly known as: CUTIVATE     fluticasone-salmeterol 230-21 MCG/ACT inhaler  Commonly known as: Advair HFA  Inhale 2 puffs into the lungs 2 times daily     * FreeStyle Lianne 2 Sensor Misc  Patient to apply a new sensor every 14 days. RX to cover voucher patient will bring in.      * FreeStyle Lianne 14 Day Sensor Misc  Use one sensor every 14 days     gabapentin 300 MG capsule  Commonly known as: Neurontin  Take 1 po bid     Gauze Pads & Dressings 4\"X4\" Pads  Twice a day dressing of right leg wound     Handicap Placard Misc  by Does not apply route Can't walk greater than 200 feet. Expires in 5 years. HUMULIN R 500 UNIT/ML concentrated injection vial  Generic drug: insulin regular human  Patient using 0.52ml with breakfast, 0.52ml with lunch and 0.45ml with dinner. Insulin Syringe-Needle U-100 31G X 5/16\" 1 ML Misc  Use to subcutaneously inject insulin three times daily     Iron 325 (65 Fe) MG Tabs  Take 1 tablet by mouth daily     ketoconazole 2 % cream  Commonly known as: NIZORAL  Apply twice a day for yeast infection in the skin folds, for 4 weeks     Lancing Device Misc  Provide patient with lancing device appropriate for his machine/lancing needles. latanoprost 0.005 % ophthalmic solution  Commonly known as: XALATAN     Lidocaine 4 % Lotn     lisinopril 5 MG tablet  Commonly known as: PRINIVIL;ZESTRIL  Take 1 tablet by mouth daily . Discontinued Lisinopril  10 mg     magnesium oxide 400 (240 Mg) MG tablet  Commonly known as: MAG-OX  Take 1 tablet by mouth daily     Melatonin 10 MG Tabs  Take 10 mg by mouth nightly as needed (insomnia)     metoprolol tartrate 50 MG tablet  Commonly known as: LOPRESSOR  Take 1 tablet by mouth 2 times daily     mupirocin 2 % ointment  Commonly known as: Bactroban  Apply topically 2 times daily on the affected area for 7-10 days. OK to substitute to cream     naloxone 4 MG/0.1ML Liqd nasal spray  1 spray by Nasal route as needed for Opioid Reversal Due to COPD and sleep apnea, this is a big recommendation for you     Nebulizer/Tubing/Mouthpiece Kit  Dx COPD needs nebulizer supplies     nitroGLYCERIN 0.4 MG SL tablet  Commonly known as: NITROSTAT  DISSOLVE 1 TAB UNDER TONGUE FOR CHEST PAIN - IF PAIN REMAINS AFTER 5 MIN, CALL 911 AND REPEAT DOSE.  MAX 3 TABS IN 15 MINUTES     nystatin 112017 UNIT/GM powder  Commonly known as: MYCOSTATIN  Apply 2 times daily in the skin folds for several months     * ONE TOUCH ULTRASOFT LANCETS [facility]  Patient to test blood sugar up to 4 times daily. * Lancets Misc  Use to check blood sugar three times daily along with when necessary due to symptoms. oxyCODONE HCl 10 MG immediate release tablet  Commonly known as: OXY-IR  Take 1 tablet by mouth every 8 hours as needed for Pain for up to 30 days. Oxygen Tubing Misc  by Does not apply route DX COPD. chronic respiratory failure     pantoprazole 40 MG tablet  Commonly known as: PROTONIX  Take 1 tablet by mouth every morning (before breakfast)     Rolled Gauze Bandage 4\"x2.5yd Misc  For twice a day dressing     rosuvastatin 10 MG tablet  Commonly known as: Crestor  Take 1 tablet by mouth nightly Stop pravastatin     Spacer/Aero Chamber Mouthpiece Misc  1 each by Does not apply route once as needed (to be used with his inhalers)     spironolactone 50 MG tablet  Commonly known as: ALDACTONE  Take 1 tablet by mouth daily     sulfacetamide 10 % ophthalmic solution  Commonly known as: BLEPH-10     tiotropium 2.5 MCG/ACT Aers inhaler  Commonly known as: Spiriva Respimat  Inhale 2 puffs into the lungs daily     tiZANidine 4 MG tablet  Commonly known as: ZANAFLEX  TAKE ONE TABLET BY MOUTH EVERY 8 HOURS AS NEEDED FOR BACK PAIN     venlafaxine 75 MG extended release capsule  Commonly known as: EFFEXOR XR  Take 1 capsule by mouth daily Take with food     vitamin B-12 500 MCG tablet  Commonly known as: CYANOCOBALAMIN  Take 1 tablet by mouth daily     vitamin D3 25 MCG (1000 UT) Tabs tablet  Commonly known as: CHOLECALCIFEROL  Take 1 tablet by mouth daily         * This list has 6 medication(s) that are the same as other medications prescribed for you. Read the directions carefully, and ask your doctor or other care provider to review them with you.             STOP taking these medications    metOLazone 2.5 MG tablet  Commonly known as: ZAROXOLYN     predniSONE 10 MG tablet  Commonly known as: [place]            Discharge Procedure Orders   Basic Metabolic Panel   Standing Status: Future Standing Exp. Date: 09/14/22   Order Comments: Follow results with Dr Brenda Leung on discharge is  39 mins in patient examination, evaluation, counseling as well as medication reconciliation, prescriptions for required medications, discharge plan and follow up. Electronically signed by   Richelle Quevedo MD  9/14/2021  2:10 PM      Thank you Dr. Roxy Patterson MD for the opportunity to be involved in this patient's care.

## 2021-09-14 NOTE — PROGRESS NOTES
[facility]  Progress Note                   Date:   9/14/2021  Patient name: Usama Conway. Date of admission:  9/11/2021 12:01 AM  MRN:   [id number]  YOB: 1965  PCP: Killian Gutierres MD    Reason for Admission: LBBB (left bundle branch block) [I44.7]  Chest pain [R07.9]  Elevated troponin [R77.8]  LIZETH (acute kidney injury) ([facility] [address] [N17.9]  Chest pain due to myocardial ischemia, unspecified ischemic chest pain type [I25.9]  Acute on chronic congestive heart failure, unspecified heart failure type ([facility] [address] [I50.9]    Subjective:       Clinical Changes /Abnormalities:   Patient seen and examined with significant other at the bed side. No new acute events overnight. Patient is doing well, has no complaints. Denies chest pain or SOB. Up and showered this morning with incident. Reviewed vitals, labs, tele, & previous testing. SR on tele. No cath site complications.       Medications:   Scheduled Meds:   acetylcysteine  1,200 mg Oral BID    [Held by provider] metOLazone  2.5 mg Oral Once per day on Mon Wed Fri    vitamin B-12  500 mcg Oral Daily    venlafaxine  75 mg Oral Daily    tiotropium  2 puff Inhalation Daily    [Held by provider] lisinopril  5 mg Oral Daily    metoprolol tartrate  50 mg Oral BID    magnesium oxide  400 mg Oral Daily    ferrous sulfate  325 mg Oral Daily    [Held by provider] spironolactone  50 mg Oral Daily    rosuvastatin  10 mg Oral Nightly    escitalopram  10 mg Oral Daily    pantoprazole  40 mg Oral QAM AC    clopidogrel  75 mg Oral Daily    latanoprost  1 drop Both Eyes Nightly    docusate sodium  100 mg Oral BID    Vitamin D  1,000 Units Oral Daily    allopurinol  100 mg Oral Daily    amitriptyline  100 mg Oral Nightly    gabapentin  300 mg Oral BID    sodium chloride flush  5-40 mL IntraVENous 2 times per day    bumetanide  3 mg Oral Daily    insulin lispro  0-12 Units SubCUTAneous TID WC    insulin lispro  0-6 Units SubCUTAneous Nightly    budesonide-formoterol  2 puff Inhalation BID    aspirin  81 mg Oral Daily     Continuous Infusions:   dextrose      sodium chloride Stopped (09/13/21 0119)    sodium chloride      nitroGLYCERIN Stopped (09/14/21 1013)    heparin (PORCINE) Infusion Stopped (09/14/21 1012)     CBC:   Recent Labs     09/13/21  0624   WBC 10.3   HGB 10.2*        BMP:    Recent Labs     09/12/21  1047 09/12/21  1047 09/12/21  1050 09/13/21  0624 09/14/21  0550     --   --  134* 136   K 4.7  --   --  4.4 4.4   CL 96*  --   --  95* 97*   CO2 26  --   --  24 22   BUN 70*  --   --  68* 52*   CREATININE 1.88*  --   --  1.72* 1.56*   GLUCOSE 236*   < > 247 304* 364*    < > = values in this interval not displayed. Hepatic:  Recent Labs     09/12/21  1047 09/13/21  0624 09/14/21  0550   AST 16 12 10   ALT 11 9 9   BILITOT 0.55 0.44 0.60   ALKPHOS 80 76 73     Troponin:   Recent Labs     09/11/21  1403 09/12/21  1327 09/12/21  2106   TROPHS 40* 34* 35*     BNP: No results for input(s): BNP in the last 72 hours. Lipids:   Recent Labs     09/12/21  1047   CHOL 137   HDL 30*     INR: No results for input(s): INR in the last 72 hours. Objective:   Vitals: BP (!) 167/63   Pulse 78   Temp 98.6 °F (37 °C) (Oral)   Resp 13   Ht 6' (1.829 m)   Wt (!) 406 lb 12 oz (184.5 kg)   SpO2 94%   BMI 55.17 kg/m²   General appearance: alert and cooperative with exam  HEENT: Head: Normocephalic, no lesions, without obvious abnormality. Neck:no JVD, trachea midline, no adenopathy  Lungs: Clear with diminished breath sounds in bases bilaterally. Currently oxygen per BiPap  Heart: Regular rate and rhythm, s1/s2 auscultated, no murmurs, SR  Right radial site site with bandaid intact. Pulse palpable. Abdomen: soft, non-tender, bowel sounds active, obese  Extremities: trace generalized edema  Neurologic: not done    ECHO 3/2021: Contrast was utilized on this technically difficult study. Left ventricle is normal in size. Mild left ventricular hypertrophy. Global left ventricular systolic function is normal. Estimated LV EF 50-55  %. No significant valvular regurgitation or stenosis seen.     STRESS 2018: Findings concerning   for reversible ischemia of the mid and anterior inferior wall as well as the   apical lateral wall. 2. Left ventricular ejection fraction of 53%. 3.  Please see report for EKG portion of the examination which will be   performed separately by physician from cardiology. Risk stratification:  High risk     Cardiac Cath 4/2018  Angiographic Findings      Cardiac Arteries and Lesion Findings     LMCA: Normal 0% stenosis.     LAD: Normal 0% stenosis.     LCx: Normal 0% stenosis. dominant  Patent OM stent     RCA: Normal 0% stenosis.      Coronary Tree      Dominance: Left     LV Analysis  LV function assessed as:Normal.  Ejection Fraction  +----------------------------------------------------------------------+---+  ! Method                                                                ! EF%! +----------------------------------------------------------------------+---+  ! LV gram                                                               !50 !  +----------------------------------------------------------------------+---+  Procedure Summary      Patent OM stent   No new lesions   Overall preserved LV function      Recommendations      Medical treatment   Risk factors modification    Cardiac Cath 9/13/2021  Findings:  LMCA: Short with separate ostia of LCX/LAD     LAD: Mid 20-30% stenosis   Diag is small, patent with mild disease     LCx: Mild irregularities 10-20%. Large and co-dominant vessel, patent with   10-20% stenosis   OMs are small and patent   LPDA/LPL are patent with mild disease     RCA: Mild irregularities 10-20%. Co-dominant vessel.    RPDA is large and patent with mild disease    Coronary Tree      Dominance: Left    Procedure Summary      1. Mild non-obstructive CAD    2. No change from last angiogram in 2018    3. LV gram not done due to renal insufficiency    Recommendations    Routine Post Diagnostic Cath orders.    optimize medical therapy    Risk factor modification.        Assessment / Acute Cardiac Problems:   1. Unstable Angina  2. New LBBB as compared to prior ECG  3. CAD with hx of PCI/stent OM1 in 2016  4. Abnormal stress test March 2021  5. LIZETH on CKD3  6. HTN  7. DM2  8. Chronic resp failure with COPD on home oxygen  9. Morbid obesity  10. BARBY on CPAP  11. Chronic gout  12.  Depression with anxiety    Patient Active Problem List:     DDD (degenerative disc disease), lumbar     Otitis externa     Hypertriglyceridemia     LIZETH (acute kidney injury) ([facility] [address]     CKD (chronic kidney disease) stage 3, GFR 30-59 ml/min (HCC)     CAD (coronary artery disease)     Chronic obstructive pulmonary disease (HCC)     Type 2 diabetes mellitus with diabetic polyneuropathy, with long-term current use of insulin (HCC)     Chronic pancreatitis ([facility] [address]     Essential hypertension     Displacement of lumbar intervertebral disc without myelopathy     Chronic diastolic congestive heart failure (HCC)     Insomnia     BPH (benign prostatic hyperplasia)     Morbid obesity with BMI of 50.0-59.9, adult ([address]     Lower abdominal pain     Hepatic steatosis     History of rib fracture     Umbilical hernia     Adrenal gland anomaly     Spinal stenosis of lumbar region without neurogenic claudication     Chronic back pain greater than 3 months duration     Gastroesophageal reflux disease without esophagitis     Hyperlipidemia with target LDL less than 70     Lower urinary tract symptoms (LUTS)     Vitamin D deficiency     Iron deficiency anemia due to chronic blood loss     BARBY treated with BiPAP     Chronic midline low back pain with left-sided sciatica     Stasis dermatitis of both legs     Anxiety     Intertrigo axillary b/l     History of recurrent ear infection     Anemia     Mixed conductive and sensorineural hearing loss of both ears     Tinnitus of both ears     Slow transit constipation     Chronic infection of both external ears     Tinea pedis of both feet     Onychomycosis     Moderate episode of recurrent major depressive disorder (HCC)     Hydrocele, bilateral     Xerosis cutis     Celiac artery stenosis (HCC)     Myopia     Nuclear nonsenile cataract     Presbyopia     Postlaminectomy syndrome of lumbar region     Venous insufficiency of left lower extremity     Bilateral hearing loss     Seizures (HCC)     Recurrent infection of skin     Vitamin B 12 deficiency     Mobility impaired     Chronic respiratory failure with hypoxia (HCC)     Chronic infective otitis externa     Benign hypertensive heart and CKD, stage 3 (GFR 30-59), w CHF (HCC)     Hypomagnesemia     Chronic malignant otitis externa of both ears     Psoriasis     Tinea corporis     Colon cancer screening declined     Excoriation     Lymphedema of both lower extremities     Venous insufficiency of both lower extremities     PVD (peripheral vascular disease) ([facility])     Chronic gout due to renal impairment without tophus     Chest pain     Unstable angina (HCC)     Acute on chronic congestive heart failure (HCC)     Elevated troponin     LBBB (left bundle branch block)      Plan of Treatment:   1. S/P cardiac Cath as noted above. No new obstructive CAD. No changes from prior cath 2018. Denies chest pain. Continue aspirin, plavix, statin and BB. 2. Nephrology on board. Creatinine stable post cath. Aldactone, Zaroxolyn, & ACE on hold d/t LIZETH. Resume when cleared by Nephrology. 3. HTN. BP stable. Was elevated prior to am medications. Continue BB. 4. Discussed in detail with patient post cath POC including but not limited to medications, diet, exercise, right radial artery site care, and follow-up. Questions and concerns addressed. OK for discharge home today. F/U in office in 2 weeks.         Electronically signed by NICOLE Trevino CNP on 9/14/2021 at 11:28 [id number].  [phone]

## 2021-09-15 ENCOUNTER — TELEPHONE (OUTPATIENT)
Dept: FAMILY MEDICINE CLINIC | Age: 56
End: 2021-09-15

## 2021-09-15 ENCOUNTER — HOSPITAL ENCOUNTER (OUTPATIENT)
Facility: MEDICAL CENTER | Age: 56
End: 2021-09-15
Payer: COMMERCIAL

## 2021-09-15 ENCOUNTER — CARE COORDINATION (OUTPATIENT)
Dept: CASE MANAGEMENT | Age: 56
End: 2021-09-15

## 2021-09-15 DIAGNOSIS — E11.65 UNCONTROLLED TYPE 2 DIABETES MELLITUS WITH HYPERGLYCEMIA (HCC): ICD-10-CM

## 2021-09-15 DIAGNOSIS — Z79.4 TYPE 2 DIABETES MELLITUS WITH DIABETIC POLYNEUROPATHY, WITH LONG-TERM CURRENT USE OF INSULIN (HCC): Primary | ICD-10-CM

## 2021-09-15 DIAGNOSIS — E11.42 TYPE 2 DIABETES MELLITUS WITH DIABETIC POLYNEUROPATHY, WITH LONG-TERM CURRENT USE OF INSULIN (HCC): Primary | ICD-10-CM

## 2021-09-15 LAB — GLUCOSE BLD-MCNC: 247 MG/DL (ref 75–110)

## 2021-09-15 RX ORDER — FLASH GLUCOSE SENSOR
KIT MISCELLANEOUS
Qty: 2 EACH | Refills: 5 | Status: SHIPPED | OUTPATIENT
Start: 2021-09-15 | End: 2021-10-12

## 2021-09-15 NOTE — TELEPHONE ENCOUNTER
[facility] Transitions Initial Follow Up Call    Outreach made within 2 business days of discharge:     Patient: Jerry Cole. Patient : 1965   MRN: [id number]  Reason for Admission: There are no discharge diagnoses documented for the most recent discharge. Discharge Date: 21       Spoke with: Erika Quintana    Discharge department/facility: [facility] Interactive Patient Contact:  Was patient able to fill all prescriptions: Yes  Was patient instructed to bring all medications to the follow-up visit: Yes  Is patient taking all medications as directed in the discharge summary?  Yes  Does patient understand their discharge instructions: Yes  Does patient have questions or concerns that need addressed prior to 7-14 day follow up office visit: no    Scheduled appointment with PCP within 7-14 days    Follow Up  Future Appointments   Date Time Provider William Hernandez   2021  8:45 AM Elsy Iglesias MD [facility]   2021 10:10 AM Chrissy Carvajal MD [facility]   2021  7:30 AM [facility]   10/21/2021  9:00 AM Karina Blanco MD fp sc [facility]   2021  8:40 AM Lenita Phoenix, MD Neuro [facility] [address] [place] [address]

## 2021-09-15 NOTE — CARE COORDINATION
[facility] Transitions Initial Follow Up Call - Attempted initial 24 hour transitional call to patient & spouse. Left VM to return call directly to CTN      Care Transitions Outreach Attempt - day #1    Call within 2 business days of discharge: Yes   Attempted to reach patient for transitions of care follow up. Unable to reach patient. Patient: Usama Conway. Patient : 1965   MRN: [id number]    Reason for Admission: [place] - no new obstructive CAD per cath  Discharge Date: 21 RARS: Readmission Risk Score: 41    Last Discharge [facility]       Complaint Diagnosis Description Type Department Provider    21 Chest Pain Chest pain due to myocardial ischemia, unspecified ischemic chest pain type . .. ED to Hosp-Admission (Discharged) (ADMITTED) [facility] CAR 2 Aicha Dove MD; [place] Done. .. Was this an external facility discharge?  No     Noted following upcoming appointments from discharge chart review:   [facility] follow up appointment(s):   Future Appointments   Date Time Provider William Hernandez   2021  8:45 AM Hector Su MD [facility]   2021 10:10 AM Molly Silvestre MD [facility]   2021  7:30 AM [facility]ICATION [facility]   10/21/2021  9:00 AM Killian Gutierres MD fp sc [facility]   2021  8:40 AM Kate Hightower MD Neuro Spec [address] [place] [address] Road: [facility]    Non-face-to-face services provided:  Obtained and reviewed discharge summary and/or continuity of care documents      Diane Rodriguez RN

## 2021-09-16 ENCOUNTER — CARE COORDINATION (OUTPATIENT)
Dept: CASE MANAGEMENT | Age: 56
End: 2021-09-16

## 2021-09-16 DIAGNOSIS — I25.110 CORONARY ARTERY DISEASE INVOLVING NATIVE CORONARY ARTERY OF NATIVE HEART WITH UNSTABLE ANGINA PECTORIS (HCC): Primary | ICD-10-CM

## 2021-09-16 PROCEDURE — 1111F DSCHRG MED/CURRENT MED MERGE: CPT | Performed by: FAMILY MEDICINE

## 2021-09-16 NOTE — CARE COORDINATION
and quarantine with CDC Guidelines. Patient was given an opportunity to verbalize any questions and concerns and agrees to contact CTN or health care provider for questions related to their healthcare. Reviewed and educated patient on any new and changed medications related to discharge diagnosis. Was patient discharged with a pulse oximeter? No  - has home oxygen    CTN provided contact information. Plan for follow-up call in 5-7 days based on severity of symptoms and risk factors.   Plan for next call: symptom management-reassess and follow up appointment-check f/u of all appts on 9/21      Follow Up  Future Appointments   Date Time Provider William Hernandez   9/21/2021  8:45 AM Myles Hernandez MD [facility]   9/21/2021 10:10 AM Oziel Santos MD [facility]   9/22/2021  7:30 AM [facility] MEDICATION MGMT [facility]   10/21/2021  9:00 AM Armando Crum MD fp sc [facility]   11/9/2021  8:40 AM Meeta Burris MD Neuro Spec [facility] [place], [facility]

## 2021-09-17 ENCOUNTER — TELEPHONE (OUTPATIENT)
Dept: PHARMACY | Age: 56
End: 2021-09-17

## 2021-09-17 NOTE — TELEPHONE ENCOUNTER
[facility] this afternoon to report that his blood sugar was low this morning, but was not sure how low. States he ate some lunch- chicken fingers and fries. Checked blood sugar a little bit after (not sure exactly how long) and blood sugar was 74. Patient has orange juice at home and he is going to drink about 4 ounces now. Discussed checking blood sugar in 15 minutes and then eating something with protein to keep blood sugar up. Patient skipped Humulin R U-500 this morning and at lunch. Patient states yesterday he was fine. He ate normally. Blood sugar yesterday at breakfast was 97, took 0.52 mL on insulin. Blood sugar prior to lunch 122 and took 0.52 mL. Blood sugar prior dinner was 212 and took 0.42mL. Pt states in general he has been trying to eat more plant based- vegan diet, but will eat some regular protein like chicken as well. Patient states morning blood sugars were a little better when he took 0.38 mL with dinner. Will have patient decrease to 0.38 mL with dinner tonight. Reduce breakfast to 0.45mL and continue with lunch at 0.52mL. Patient to call the clinic back with any more low blood sugars.  Has appointment next Wednesday 9/22  Eron Valiente [address] [facility]  9/17/2021 4:32 PM

## 2021-09-21 ENCOUNTER — TELEPHONE (OUTPATIENT)
Dept: ONCOLOGY | Age: 56
End: 2021-09-21

## 2021-09-21 ENCOUNTER — INITIAL CONSULT (OUTPATIENT)
Dept: ONCOLOGY | Age: 56
End: 2021-09-21
Payer: COMMERCIAL

## 2021-09-21 ENCOUNTER — OFFICE VISIT (OUTPATIENT)
Dept: UROLOGY | Age: 56
End: 2021-09-21
Payer: COMMERCIAL

## 2021-09-21 VITALS
HEIGHT: 72 IN | DIASTOLIC BLOOD PRESSURE: 78 MMHG | WEIGHT: 315 LBS | SYSTOLIC BLOOD PRESSURE: 132 MMHG | HEART RATE: 80 BPM | TEMPERATURE: 97.9 F | BODY MASS INDEX: 42.66 KG/M2

## 2021-09-21 VITALS
HEART RATE: 96 BPM | BODY MASS INDEX: 42.66 KG/M2 | DIASTOLIC BLOOD PRESSURE: 59 MMHG | HEIGHT: 72 IN | SYSTOLIC BLOOD PRESSURE: 115 MMHG | WEIGHT: 315 LBS | TEMPERATURE: 98 F

## 2021-09-21 DIAGNOSIS — N50.89 SCROTAL EDEMA: ICD-10-CM

## 2021-09-21 DIAGNOSIS — D64.9 NORMOCYTIC ANEMIA: ICD-10-CM

## 2021-09-21 DIAGNOSIS — D50.0 IRON DEFICIENCY ANEMIA DUE TO CHRONIC BLOOD LOSS: ICD-10-CM

## 2021-09-21 DIAGNOSIS — D63.1 ANEMIA OF CHRONIC RENAL FAILURE, STAGE 3B (HCC): Primary | ICD-10-CM

## 2021-09-21 DIAGNOSIS — N18.32 ANEMIA OF CHRONIC RENAL FAILURE, STAGE 3B (HCC): Primary | ICD-10-CM

## 2021-09-21 DIAGNOSIS — N43.0 ENCYSTED HYDROCELE: Primary | ICD-10-CM

## 2021-09-21 PROCEDURE — 99205 OFFICE O/P NEW HI 60 MIN: CPT | Performed by: INTERNAL MEDICINE

## 2021-09-21 PROCEDURE — 99202 OFFICE O/P NEW SF 15 MIN: CPT | Performed by: INTERNAL MEDICINE

## 2021-09-21 PROCEDURE — 1036F TOBACCO NON-USER: CPT | Performed by: UROLOGY

## 2021-09-21 PROCEDURE — 1111F DSCHRG MED/CURRENT MED MERGE: CPT | Performed by: UROLOGY

## 2021-09-21 PROCEDURE — 99214 OFFICE O/P EST MOD 30 MIN: CPT | Performed by: UROLOGY

## 2021-09-21 PROCEDURE — G8417 CALC BMI ABV UP PARAM F/U: HCPCS | Performed by: UROLOGY

## 2021-09-21 PROCEDURE — G8427 DOCREV CUR MEDS BY ELIG CLIN: HCPCS | Performed by: INTERNAL MEDICINE

## 2021-09-21 PROCEDURE — G8427 DOCREV CUR MEDS BY ELIG CLIN: HCPCS | Performed by: UROLOGY

## 2021-09-21 PROCEDURE — G8417 CALC BMI ABV UP PARAM F/U: HCPCS | Performed by: INTERNAL MEDICINE

## 2021-09-21 PROCEDURE — 3017F COLORECTAL CA SCREEN DOC REV: CPT | Performed by: UROLOGY

## 2021-09-21 ASSESSMENT — ENCOUNTER SYMPTOMS
SHORTNESS OF BREATH: 0
WHEEZING: 0
BACK PAIN: 0
NAUSEA: 0
VOMITING: 0
EYE REDNESS: 0
EYE PAIN: 0
COUGH: 0
COLOR CHANGE: 0
ABDOMINAL PAIN: 0

## 2021-09-21 NOTE — PROGRESS NOTES
_               Mr. Isela David is a very pleasant 63 y.o. male with history of multiple co morbidities as listed. Patient is referred for evaluation of anemia. The patient has multiple comorbidities. He has diabetes mellitus for long duration. He has stage III renal insufficiency. He had so many other health problems as stated below. Patient has morbid obesity with the chronic back problems. Multiple surgeries. The patient is referred for evaluation of anemia. He denies any active bleeding. No melena or hematochezia. No hematemesis. No hematuria. He has weakness and fatigue. No dizziness. No palpitation. No fever or infections. Patient had GI evaluation with colonoscopy last year. Polyps were removed. No active bleeding. Patient is smoker of 1.5 packs per day for 40 years. .  Social alcohol drinking.       PAST MEDICAL HISTORY: has a past medical history of Acute on chronic kidney failure ([address] Acute on chronic respiratory failure (HCC), Adhesive capsulitis of left shoulder, Anxiety, Arthropathy, unspecified, other specified sites, Asthma, B12 deficiency, Bilateral lower leg cellulitis, Blood in stool, CAD (coronary artery disease), Cardiovascular stress test abnormal, Cellulitis of both lower extremities, Cellulitis of leg, left, CHF (congestive heart failure), NYHA class III (HCC), Chronic back pain, Chronic bronchitis ([address] Chronic headaches, Chronic respiratory failure ([address] Chronic ulcer of left leg, with fat layer exposed ([address] Class 2 severe obesity due to excess calories with serious comorbidity and body mass index (BMI) of 35.0 to 35.9 in [facility]), COPD exacerbation ([address] Diabetic neuropathy ([address] Displacement of lumbar intervertebral disc without myelopathy, Ear infection, Essential hypertension, Facial cellulitis, Fall, GERD (gastroesophageal reflux disease), Head injury, Hearing loss in right ear, Hepatic steatosis, History of general anesthesia complication, History of rib fracture, Hyperlipidemia, Hypersomnia, Hypertension, Insomnia, Intolerance of continuous positive airway pressure (CPAP) ventilation, Iron deficiency, Localized rash, Magnesium deficiency, Mastoiditis of left side, Mixed conductive and sensorineural hearing loss of both ears, Mixed type COPD (chronic obstructive pulmonary disease) (HCC), Moderate recurrent major depression ([address] Morbid obesity with BMI of 45.0-49.9, adult ([address] On home oxygen therapy, Open wound of groin, BARBY on CPAP, Osteoarthritis, Otitis externa of left ear, Pancreatitis chronic, Persistent depressive disorder, Renal insufficiency, Severe depression ([address] Spinal stenosis of lumbar region without neurogenic claudication, Syncope, Tinnitus of both ears, Type 2 diabetes mellitus with stage 3 chronic kidney disease, with long-term current use of insulin ([address] Type II or unspecified type diabetes mellitus without mention of complication, not stated as uncontrolled, Vitamin D deficiency, and Wears glasses. PAST SURGICAL HISTORY: has a past surgical history that includes tympanomastoidectomy (Bilateral, 09/20/2012); Coronary angioplasty with stent (03/2013); Hand tendon surgery (Left); Knee arthroscopy (Left); Nerve Block (07/31/2015); Cardiac catheterization (04/23/2018); pr esophagogastroduodenoscopy transoral diagnostic (N/A, 07/18/2018); Intracapsular cataract extraction (Right, 11/05/2019); Intracapsular cataract extraction (Left, 01/07/2020); back surgery ( (x 4) 2000,.12/2011.2/2012); Colonoscopy (11/03/2015); Colonoscopy (2013); and Colonoscopy (N/A, 04/12/2021).      CURRENT MEDICATIONS:  has a current medication list which includes the following prescription(s): freestyle floresita 14 day sensor, oxycodone hcl, allopurinol, vitamin d3, docusate sodium, latanoprost, gabapentin, butalbital-acetaminophen-caffeine, clopidogrel, pantoprazole, insulin syringe-needle u-100, humulin r, escitalopram, rosuvastatin, freestyle floresita 2 sensor, ammonium lactate, sulfacetamide, mupirocin, gauze pads & dressings, rolled gauze bandage 4\"x2.5yd, proair hfa, amitriptyline, nystatin, tizanidine, bumetanide, spironolactone, clobetasol, ketoconazole, albuterol, iron, metoprolol tartrate, magnesium oxide, lisinopril, fluticasone-salmeterol, tiotropium, venlafaxine, blood glucose test strips, lancets, vitamin b-12, oxygen tubing, nebulizer/tubing/mouthpiece, one touch ultrasoft lancets, lancing device, lidocaine, handicap placard, fluticasone, fluticasone, melatonin, aspirin, and nitroglycerin. ALLERGIES:  is allergic to levofloxacin, lorazepam, nsaids, prozac [fluoxetine hcl], and vancomycin. FAMILY HISTORY: Negative for any hematological or oncological conditions. SOCIAL HISTORY:  reports that he quit smoking about 10 months ago. His smoking use included cigarettes. He started smoking about 36 years ago. He has a 8.25 pack-year smoking history. He quit smokeless tobacco use about 26 years ago. His smokeless tobacco use included snuff. He reports previous drug use. Drug: Marijuana. He reports that he does not drink alcohol. REVIEW OF SYSTEMS:     · General: Positive for weakness and fatigue. No unanticipated weight loss or decreased appetite. No fever or chills. · Eyes: No blurred vision, eye pain or double vision. · Ears: No hearing problems or drainage. No tinnitus. · Throat: No sore throat, problems with swallowing or dysphagia. · Respiratory: No cough, sputum or hemoptysis. No shortness of breath. No pleuritic chest pain. · Cardiovascular: No chest pain, orthopnea or PND. No lower extremity edema. No palpitation. · Gastrointestinal: No problems with swallowing. No abdominal pain or bloating. No nausea or vomiting. No diarrhea or constipation. No GI bleeding. · Genitourinary: No dysuria, hematuria, frequency or urgency.      · Musculoskeletal: As above.  · Dermatologic: No skin rashes or pruritus. No skin lesions or discolorations. · Psychiatric: No depression, anxiety, or stress or signs of schizophrenia. No change in mood or affect. · Hematologic: No history of bleeding tendency. No bruises or ecchymosis. No history of clotting problems. · Infectious disease: No fever, chills or frequent infections. · Endocrine: No polydipsia or polyuria. No temperature intolerance. · Neurologic: No headaches or dizziness. No weakness or numbness of the extremities. No changes in balance, coordination,  memory, mentation, behavior. · Allergic/Immunologic: No nasal congestion or hives. No repeated infections. PHYSICAL EXAM:  The patient is not in acute distress. Vital signs: Blood pressure (!) 115/59, pulse 96, temperature 98 °F (36.7 °C), temperature source Temporal, height 6' (1.829 m), weight (!) 414 lb 1.6 oz (187.8 kg). General appearance - well appearing, not in pain or distress. Morbidly obese.   Mental status - good mood, alert and oriented  Eyes - pupils equal and reactive, extraocular eye movements intact  Ears - bilateral TM's and external ear canals normal  Nose - normal and patent, no erythema, discharge or polyps  Mouth - mucous membranes moist, pharynx normal without lesions  Neck - supple, no significant adenopathy  Lymphatics - no palpable lymphadenopathy, no hepatosplenomegaly  Chest - clear to auscultation, no wheezes, rales or rhonchi, symmetric air entry  Heart - normal rate, regular rhythm, normal S1, S2, no murmurs, rubs, clicks or gallops  Abdomen - soft, nontender, nondistended, no masses or organomegaly  Neurological - alert, oriented, normal speech, no focal findings or movement disorder noted  Musculoskeletal -limited movement due to morbid obesity and chronic back problems extremities - peripheral pulses normal, no pedal edema, no clubbing or cyanosis  Skin - normal coloration and turgor, no rashes, no suspicious skin lesions noted     Review of Diagnostic data:   Lab Results   Component Value Date    WBC 10.3 09/13/2021    HGB 10.2 (L) 09/13/2021    HCT 34.1 (L) 09/13/2021    MCV 88.6 09/13/2021     09/13/2021       Chemistry        Component Value Date/Time     09/14/2021 0550    K 4.4 09/14/2021 0550    CL 97 (L) 09/14/2021 0550    CO2 22 09/14/2021 0550    BUN 52 (H) 09/14/2021 0550    CREATININE 1.56 (H) 09/14/2021 0550        Component Value Date/Time    CALCIUM 8.9 09/14/2021 0550    ALKPHOS 73 09/14/2021 0550    AST 10 09/14/2021 0550    ALT 9 09/14/2021 0550    BILITOT 0.60 09/14/2021 0550            IMPRESSION:   Normocytic anemia secondary to chronic renal insufficiency  Stage III chronic renal insufficiency  Morbid obesity  Diabetes  Multiple comorbidities as listed    PLAN: I reviewed the labs available to me and discussed with the patient. For more than 40 minutes of face to face discussion, I explained to the patient the nature of this hematologic problem. I explained the significance of these abnormalities in layman language. Reviewing patient's labs obviously he is having chronic normocytic anemia. Labs are getting slowly worse over the last 10 years. This is correlating with deterioration of the kidney function as well. Iron studies and vitamin B12 and folate are not showing any significant abnormalities. Patient's anemia is anemia of chronic disease secondary to chronic renal insufficiency. However hemoglobin is maintained slightly above 10. So he does not qualify for erythropoietin treatment at the present time. I recommend close monitoring and consideration of treatment with Epogen once hemoglobin is below 10. Patient totally understands and agrees with this plan. We will evaluate him in 4 to 6 months. Sooner for any problems. Patient's questions were answered to the best of his satisfaction and he verbalized full understanding and agreement.

## 2021-09-21 NOTE — TELEPHONE ENCOUNTER
WILIAN ARRIVES VIA WHEELCHAIR FOR CONSULTATION APPOINTMENT  DR MIKE IN TO SEE PATIENT  ORDERS RECEIVED  RV 4-6 MONTHS WITH CBC & IRON STUDIES BEFORE  LABS CDP FE TIBC FERRITIN TO BE DONE 3/10/22, ORDERS GIVEN TO PATIENT  MD VISIT 3/17/22 @8AM  AVS PRINTED AND GIVEN TO PATIENT WITH INSTRUCTIONS  PATIENT DISCHARGED VIA WHEELCHAIR

## 2021-09-21 NOTE — PROGRESS NOTES
..Review of Systems   Constitutional: Negative for appetite change, chills and fever. Eyes: Negative for pain, redness and visual disturbance. Respiratory: Negative for cough, shortness of breath and wheezing. Cardiovascular: Negative for chest pain and leg swelling. Gastrointestinal: Negative for abdominal pain, nausea and vomiting. Genitourinary: Positive for scrotal swelling, testicular pain and urgency. Negative for difficulty urinating, dysuria, flank pain, frequency and hematuria. Musculoskeletal: Negative for back pain, joint swelling and myalgias. Skin: Negative for color change, rash and wound. Neurological: Negative for dizziness, tremors, weakness, numbness and headaches. Hematological: Negative for adenopathy. Does not bruise/bleed easily.

## 2021-09-21 NOTE — LETTER
[address] [address]  Dept: [phone]  Dept Fax: [phone]        9/21/21    Patient: Radha Perez. YOB: 1965    Dear Indira Jones MD,    I had the pleasure of seeing one of your patients, Janice Rodriguez. today in the office today. Below are the relevant portions of my assessment and plan of care. IMPRESSION:  1. Encysted hydrocele    2. Scrotal edema        PLAN:  We had a long discussion regarding his options. He is not the best surgical candidate, and given scrotal edema, will hold off on any hydrocele repair at this time. F/u in 6 months. Return in about 6 months (around 3/21/2022). Prescriptions Ordered:  No orders of the defined types were placed in this encounter. Orders Placed:  No orders of the defined types were placed in this encounter. Thank you for allowing me to participate in the care of this patient. I will keep you updated on this patient's follow up and I look forward to serving you and your patients again in the future.         Gagan Grnat MD

## 2021-09-21 NOTE — PROGRESS NOTES
Intolerance of continuous positive airway pressure (CPAP) ventilation 07/20/2017    Iron deficiency     Localized rash     gets frequently in axilla, groin, in any fold, on several topical treatments for this    Magnesium deficiency     Mastoiditis of left side     Mixed conductive and sensorineural hearing loss of both ears 01/10/2017    Per ENT    Mixed type COPD (chronic obstructive pulmonary disease) ([address]     On home O2, multiple inhlaers, nebulizer    Moderate recurrent major depression ([address] 10/02/2016    Morbid obesity with BMI of 45.0-49.9, adult ([address] 06/16/2015    On home oxygen therapy     3 Lpm prn    Open wound of groin 12/19/2018    healed     BARBY on CPAP     Osteoarthritis     Otitis externa of left ear     Pancreatitis chronic     Persistent depressive disorder 11/19/2019    Renal insufficiency     proteinuria    Severe depression ([address] 09/25/2013    Spinal stenosis of lumbar region without neurogenic claudication 01/06/2016    MRI lumbar 12/30/15 L3-L4: There is broad-based bulging disc which appears protruding left laterally causing flattening of the ventral thecal sac. In addition, there is facet arthropathy with mild hypertrophic changes.  There is borderline central canal stenosis with  evidence of moderate left neural foraminal narrowing and mild right neural foramina narrowing.   L4-L5: There is broad-based protrud    Syncope 04/28/2017    Tinnitus of both ears 01/10/2017    Per ENT    Type 2 diabetes mellitus with stage 3 chronic kidney disease, with long-term current use of insulin ([address] 12/26/2016    due to underlying condition with hyperosmolarity without coma    Type II or unspecified type diabetes mellitus without mention of complication, not stated as uncontrolled     uncontrolled    Vitamin D deficiency     Wears glasses     for reading     Past Surgical History:   Procedure Laterality Date    BACK SURGERY   (x 4) 2000,.12/2011.2/2012     Dr Ammy Emmanuel last 2 surg    CARDIAC CATHETERIZATION  2018    PATENT OM STENT    COLONOSCOPY  2015    hemorrhoids, poor prep, not done    COLONOSCOPY      COLONOSCOPY N/A 2021    COLONOSCOPY POLYPECTOMY SNARE/COLD BIOPSY/HOT BIOPSY/CLIP APPLICATION X1 performed by Meri Draper MD at [address]  03/2013    x 1    HAND TENDON SURGERY Left     thumb tendon repair    INTRACAPSULAR CATARACT EXTRACTION Right 2019    EYE CATARACT EMULSIFICATION IOL IMPLANT performed by Lashon Krishnan MD at [facility] Left 2020    EYE CATARACT EMULSIFICATION IOL IMPLANT performed by Lashon Krishnan MD at [facility] ARTHROSCOPY Left     NERVE BLOCK  2015    TENS unit    [facility] ESOPHAGOGASTRODUODENOSCOPY TRANSORAL DIAGNOSTIC N/A 2018    EGD ESOPHAGOGASTRODUODENOSCOPY performed by Meri Draper MD at [address] TYMPANOMASTOIDECTOMY Bilateral 2012     [facility]     Family History   Problem Relation Age of Onset    Heart Disease Mother          age 64 from [facility] High Blood Pressure Mother     Diabetes Mother     High Blood Pressure Father          age 80 from CKD and Lung Fibrosis    Kidney Disease Father     Heart Disease Sister     Heart Attack Sister     Obesity Sister     Diabetes Sister      Outpatient Medications Marked as Taking for the 21 encounter (Office Visit) with Andrey Bowen MD   Medication Sig Dispense Refill    Continuous Blood Gluc Sensor (FREESTYLE CRISTINE 14 DAY SENSOR) MISC USE AS DIRECTED AND CHANGE EVERY 14 DAYS 2 each 5    oxyCODONE HCl (OXY-IR) 10 MG immediate release tablet Take 1 tablet by mouth every 8 hours as needed for Pain for up to 30 days. 90 tablet 0    allopurinol (ZYLOPRIM) 100 MG tablet Take 1 tablet by mouth daily FOR GOUT.  STOP IT IF ANY RASH DEVELOPS! 90 tablet 3    vitamin D3 (CHOLECALCIFEROL) 25 MCG (1000 UT) TABS tablet Take 1 tablet by mouth daily 90 tablet 1    docusate sodium (COLACE) 100 MG capsule Take 1 capsule by mouth 2 times daily For constipation 180 capsule 3    latanoprost (XALATAN) 0.005 % ophthalmic solution 1 drop nightly      gabapentin (NEURONTIN) 300 MG capsule Take 1 po bid 60 capsule 2    butalbital-acetaminophen-caffeine (FIORICET, ESGIC) -40 MG per tablet Take 1 tablet by mouth every 8 hours as needed for Headaches 60 tablet 1    clopidogrel (PLAVIX) 75 MG tablet Take 1 tablet by mouth daily 90 tablet 3    nitroGLYCERIN (NITROSTAT) 0.4 MG SL tablet DISSOLVE 1 TAB UNDER TONGUE FOR CHEST PAIN - IF PAIN REMAINS AFTER 5 MIN, CALL 911 AND REPEAT DOSE. MAX 3 TABS IN 15 MINUTES 25 tablet 2    pantoprazole (PROTONIX) 40 MG tablet Take 1 tablet by mouth every morning (before breakfast) 90 tablet 1    Insulin Syringe-Needle U-100 31G X 5/16\" 1 ML MISC Use to subcutaneously inject insulin three times daily 300 each 2    insulin regular human (HUMULIN R) 500 UNIT/ML concentrated injection vial Patient using 0.52ml with breakfast, 0.52ml with lunch and 0.45ml with dinner. 60 mL 3    escitalopram (LEXAPRO) 10 MG tablet Take 1 tablet by mouth daily 90 tablet 3    rosuvastatin (CRESTOR) 10 MG tablet Take 1 tablet by mouth nightly Stop pravastatin 90 tablet 3    Continuous Blood Gluc Sensor (FREESTYLE CRISTINE 2 SENSOR) [facility] Patient to apply a new sensor every 14 days. RX to cover voucher patient will bring in. 2 each 0    ammonium lactate (LAC-HYDRIN) 12 % cream Apply topically to dry skin BID. 1 Bottle 4    sulfacetamide (BLEPH-10) 10 % ophthalmic solution       mupirocin (BACTROBAN) 2 % ointment Apply topically 2 times daily on the affected area for 7-10 days. OK to substitute to cream 30 g 2    Gauze Pads & Dressings 4\"X4\" PADS Twice a day dressing of right leg wound 60 each 5    Gauze Bandages (ROLLED GAUZE BANDAGE 4\"X2. 5YD) MISC For twice a day dressing 60 each 5    PROAIR  (90 Base) MCG/ACT inhaler INHALE TWO PUFFS BY MOUTH EVERY 6 HOURS AS NEEDED FOR WHEEZING OR FOR SHORTNESS OF BREATH 8.5 g 3    amitriptyline (ELAVIL) 50 MG tablet Take 2 po qhs 60 tablet 5    nystatin (MYCOSTATIN) 707755 UNIT/GM powder Apply 2 times daily in the skin folds for several months 1 Bottle 3    tiZANidine (ZANAFLEX) 4 MG tablet TAKE ONE TABLET BY MOUTH EVERY 8 HOURS AS NEEDED FOR BACK PAIN 90 tablet 5    bumetanide (BUMEX) 1 MG tablet TAKE THREE TABLETS BY MOUTH TWICE A  tablet 2    spironolactone (ALDACTONE) 50 MG tablet Take 1 tablet by mouth daily 90 tablet 3    clobetasol (TEMOVATE) 0.05 % ointment Apply topically 2 times daily for psoriasis 1 Tube 3    ketoconazole (NIZORAL) 2 % cream Apply twice a day for yeast infection in the skin folds, for 4 weeks 1 Tube 3    albuterol (PROVENTIL) (2.5 MG/3ML) 0.083% nebulizer solution Take 3 mLs by nebulization every 6 hours as needed for Wheezing or Shortness of Breath 120 vial 3    Ferrous Sulfate (IRON) 325 (65 Fe) MG TABS Take 1 tablet by mouth daily 90 tablet 3    metoprolol tartrate (LOPRESSOR) 50 MG tablet Take 1 tablet by mouth 2 times daily 180 tablet 3    magnesium oxide (MAG-OX) 400 (240 Mg) MG tablet Take 1 tablet by mouth daily 90 tablet 3    lisinopril (PRINIVIL;ZESTRIL) 5 MG tablet Take 1 tablet by mouth daily . Discontinued Lisinopril  10 mg 90 tablet 3    fluticasone-salmeterol (ADVAIR HFA) 230-21 MCG/ACT inhaler Inhale 2 puffs into the lungs 2 times daily 12 g 11    tiotropium (SPIRIVA RESPIMAT) 2.5 MCG/ACT AERS inhaler Inhale 2 puffs into the lungs daily 12 g 11    venlafaxine (EFFEXOR XR) 75 MG extended release capsule Take 1 capsule by mouth daily Take with food 90 capsule 3    blood glucose monitor strips Test 3 times a day & as needed for symptoms of irregular blood glucose. Dispense sufficient amount for indicated testing frequency plus additional to accommodate PRN testing needs.  One touch Ultra blue 300 strip 0    Lancets MISC Use to oriented to person, place and time. Physical Exam  Constitutional: Patient in no acute distress. Neuro: Alert and oriented to person, place and time. Psych: Mood normal, affect normal  Lungs: Respiratory effort is normal  Cardiovascular: Warm & Pink  Abdomen: Soft, non-tender, non-distended with no CVA,  No flank tenderness,  Or hepatosplenomegaly   Lymphatics: No palpablelymphadenopathy. Bladder non-tender and not distended. Musculoskeletal: Normal gait and station    Scrotal edema noted. Right hydrocele >left. Assessment and Plan      1. Encysted hydrocele    2. Scrotal edema           Plan: We had a long discussion regarding his options. He is not the best surgical candidate, and given scrotal edema, will hold off on any hydrocele repair at this time. F/u in 6 months. Return in about 6 months (around 3/21/2022). Prescriptions Ordered:  No orders of the defined types were placed in this encounter. Orders Placed:  No orders of the defined types were placed in this encounter. Parth Pritchett MD    Agree with the ROS entered by the MA.

## 2021-09-22 ENCOUNTER — TELEPHONE (OUTPATIENT)
Dept: PHARMACY | Age: 56
End: 2021-09-22

## 2021-09-22 ENCOUNTER — HOSPITAL ENCOUNTER (OUTPATIENT)
Dept: PHARMACY | Age: 56
Setting detail: THERAPIES SERIES
Discharge: HOME | End: 2021-09-22
Payer: COMMERCIAL

## 2021-09-22 PROCEDURE — 99213 OFFICE O/P EST LOW 20 MIN: CPT

## 2021-09-22 RX ORDER — BLOOD SUGAR DIAGNOSTIC
STRIP MISCELLANEOUS
Qty: 300 EACH | Refills: 2 | Status: SHIPPED | OUTPATIENT
Start: 2021-09-22

## 2021-09-22 NOTE — PROGRESS NOTES
Diabetic Medication Management   [address]. [place], [address] [facility]  Phone: [phone]  Fax: [phone]    NAME: Luis Carlos Rosales. MEDICAL RECORD NUMBER:  [id number]  AGE: 63 y.o. GENDER: male  : 1965  EPISODE DATE:  2021       Mr. Claude Randy was referred to [facility] by Dr. Wendie Perez, Special instructions include: titrate all medications (as defined in clinic's policy and procedure)    Patient seen in office. Goals per referral:   Fasting blood glucose: < 130  Peak postprandial glucose: < 180  A1C: < 7    Other goals:  Blood pressure goal: 130/80  Weight loss goal (~10%): Target weight  410 to be reached by date: 2021 (3-6 months)  Physical Activity goal:    At this time regular exercise is not realistic for patient so no goal set. Smoking Cessation   Quit Date: Stopped 20. Cholesterol at target:   Date: Yes LDL 46  HDL 32 Trig 213 (2021)  Annual eye exam:    Date: 2021  Comprehensive annual foot exam:   Date:    Annual urine Albumin and serum creatinine:   Date:  microalbumin <12 mg/L (21), SrCr 1.56 mg/dL (21) eGFR 49.18 ml/min        Subjective   Mr. Claude Randy is a 63 y.o. male here for the Diabetes Service for self-management education, medication review including over the counter medications and herbal products, overall well-being assessment, transition of care and any needed adjustments with updates and recommendations communicated to the referring physician. Patient's name and  verified. Patient Findings:    Patient was hospitalized  thru 9/15 for chest pain. Did have heart cath and was told all was good. Patient also was told he does not have heart failure. Will call cardiologist to make appt to discuss possible change in medications due to this. Patient states at this time there are no medication changes.  Patient continued to have chest pressure he states similar to what happened that caused him to go into the hospital but not as severe. Patient thinks may be anxiety as does seem to go away if lays in the dark and relaxes. Patient states while in hospital he was not given medications as he usually takes and was not given his U500 insulin. States was not offered to bring his own medications in. Patient ate one sandwich for dinner about 6:30/7pm last night and woke up about 3am with blood sugar in 40's. Drank about 8 oz of orange juice and a ham sandwich on wheat bread and went back to sleep. Patient has had a couple hypoglycemic episodes all occurring between midnight and 4am it appears. Patient indicates he is taking U500 insulin . 52 with breakfast; .52 with lunch and .45 with dinner. This is different than discussed at last appt but patient has been adjust for elevations and lows of blood sugar over last several weeks. Patient was on prednisone and had very elevated blood sugars since last appt as well. Patient also reports he checks blood sugars prior to giving insulin and if blood sugar between  he lowers his insulin dose to 0.2/    Patient had episode of gout since last appt with gouty attack that was very very painful per patient. Patient had elevated uric acid level of 11.5. Patient was on prednisone taper (ending before hospitalization) and allopurinol. Patient states he stopped his allopurinol as he states his gout resolved. Educated patient the difference between a gouty attack and an elevated uric acid level or gout. Discussed other potential dangers of having an elevated uric acid and discussed that allopurinol is meant as a maintenance drug to lower uric acid. Encouraged patient to resume allopurinol and to ask provider before stopping. Patient indicates he was given paper on which foods can result in elevated uric acid levels and has altered his diet.   Discussed that diet can only affect uric acid level so much and that his was elevated enough that even with diet changes he likely needs allopurinol therapy. Patient agrees to resume and has some at home. Patient indicates he had A1c done at PCP visit via [address]. Patient reports it at 7.2 on 8/30/21. No documentation in Epic seen and when office called they indicated they have no record of the testing being done. Patient has enough of all diabetic medications and glucose testing supplies / freestyle floresita sensors etc other than syringes. RX for syringes sent to [facility]. Patient reports continuing to abstain from marijuana smoking but has smoked three (3) cigarettes in last week. Patient reports he has not bought any but did get them from people that were over visiting that were smoking. States he did not smoke the entire cigarette. Encouraged patient to avoid doing this as this will put him at risk of smoking again regularly. Patient does report being more SOB the last few days but indicates he has been very busy with lots of physician visits. Patient did see hematologist but patient indicates no change a this time. Will follow up in 6 months. Patient has [facility] and brings in  for evaluation. Pegg'd used to review glucose reports. Multiple missing days of information in last two weeks as patient reports having sensors on while in hospital but not scanning the sensors 9/11-9/15. Patient reports wound have healed and infections under arms are under control as well. Patient reports mainly plant based diet but not as strict as before. Patient does admit to some skipped meals. Patient understands goal to not skip meals if at all possible.        []  Missed doses   []  Emergency Room Visit    [x]  Medication changes  [x]  Hospitalization   [x]  Diet changes   [x]  Acute illness   []  Activity changes      Symptoms of hypoglycemia    [x]  None    []  Shakiness    [x]  Lightheadedness or dizziness   []  Confusion      []  Sweating   [] Other        Symptoms of hyperglycemia -.    []  None   [x]  Frequent urination    []  Increased thirst   []  Other    Medication adverse reactions (none due to diabetic medicaitons)   [x]  None   []  Diarrhea / Nausea / Vomiting / Constipation / flatulence   []  Hypertension   []  Peripheral edema     []  Signs of infection   []  Headache   []  Vision changes   []  Increased cholesterol    []  Weight gain   []  Change in renal function   []  Increased potassium  []  Other      Comments:  Patient takes Humulin R U500. Patient has been taking Humulin R U500 0.52ml with breakfast, 0.52ml with lunch and 0.45 with dinner.       If recent hospital admission / ED visit, was this related to Diabetes No:Fall Discharge date 11/9/20    Objective     PMHx:    Past Medical History:   Diagnosis Date    Acute on chronic kidney failure ([facility] 07/20/2017    Acute on chronic respiratory failure ([facility] 10/02/2018    Adhesive capsulitis of left shoulder 03/25/2017    Anxiety 10/02/2016    smokes marijuana for this    Arthropathy, unspecified, other specified sites 06/13/2013    Asthma     B12 deficiency     Bilateral lower leg cellulitis 02/17/2016    Blood in stool     CAD (coronary artery disease)     Cardiovascular stress test abnormal 2018    Cellulitis of both lower extremities 05/25/2017    Cellulitis of leg, left 07/20/2017    CHF (congestive heart failure), NYHA class III (HCC) 08/14/2013    Chronic back pain     Chronic bronchitis (HCC)     Chronic headaches     was referred to neuro, testing scheduled    Chronic respiratory failure ([address]     was on vent    Chronic ulcer of left leg, with fat layer exposed ([facility] 02/22/2019    healed    Class 2 severe obesity due to excess calories with serious comorbidity and body mass index (BMI) of 35.0 to 35.9 in adult ([address]     (BMI 35.0-39.9 without comorbidity)    COPD exacerbation ([address] 11/02/2016    Diabetic neuropathy ([facility] 08/14/2013    Displacement of lumbar intervertebral disc without myelopathy 06/13/2013    Ear infection     RIGHT    Essential hypertension     Facial cellulitis 2012    Fall 03/25/2017    GERD (gastroesophageal reflux disease)     Head injury     Hearing loss in right ear     pencil pierced ear as a child    Hepatic steatosis 12/03/2015    History of general anesthesia complication     has woke up during surgery under anesthesia    History of rib fracture 12/03/2015    Chronic     Hyperlipidemia     Hypersomnia     can go multiple days without sleeping    Hypertension     Insomnia     Intolerance of continuous positive airway pressure (CPAP) ventilation 07/20/2017    Iron deficiency     Localized rash     gets frequently in axilla, groin, in any fold, on several topical treatments for this    Magnesium deficiency     Mastoiditis of left side     Mixed conductive and sensorineural hearing loss of both ears 01/10/2017    Per ENT    Mixed type COPD (chronic obstructive pulmonary disease) ([address]     On home O2, multiple inhlaers, nebulizer    Moderate recurrent major depression ([address] 10/02/2016    Morbid obesity with BMI of 45.0-49.9, adult ([address] 06/16/2015    On home oxygen therapy     3 Lpm prn    Open wound of groin 12/19/2018    healed     BARBY on CPAP     Osteoarthritis     Otitis externa of left ear     Pancreatitis chronic     Persistent depressive disorder 11/19/2019    Renal insufficiency     proteinuria    Severe depression ([address] 09/25/2013    Spinal stenosis of lumbar region without neurogenic claudication 01/06/2016    MRI lumbar 12/30/15 L3-L4: There is broad-based bulging disc which appears protruding left laterally causing flattening of the ventral thecal sac. In addition, there is facet arthropathy with mild hypertrophic changes.  There is borderline central canal stenosis with  evidence of moderate left neural foraminal narrowing and mild right neural foramina narrowing.   L4-L5: There is broad-based protrud (LAC-HYDRIN) 12 % cream Apply topically to dry skin BID. 5/14/21   Hugo Hu DPM   sulfacetamide (BLEPH-10) 10 % ophthalmic solution  3/24/21   Historical Provider, MD   mupirocin (BACTROBAN) 2 % ointment Apply topically 2 times daily on the affected area for 7-10 days. OK to substitute to cream 4/23/21   Armando Crum MD   Gauze Pads & Dressings 4\"X4\" PADS Twice a day dressing of right leg wound 4/23/21   Armando Crum MD   Gauze Bandages (ROLLED GAUZE BANDAGE 4\"X2. 5YD) MISC For twice a day dressing 4/23/21   Armadno Crum MD   PROAIR  (90 Base) MCG/ACT inhaler INHALE TWO PUFFS BY MOUTH EVERY 6 HOURS AS NEEDED FOR WHEEZING OR FOR SHORTNESS OF BREATH 4/13/21   Armando Crum MD   amitriptyline (ELAVIL) 50 MG tablet Take 2 po qhs 4/9/21   Meeta Burris MD   nystatin (MYCOSTATIN) 636406 UNIT/GM powder Apply 2 times daily in the skin folds for several months 3/31/21   Armando Crum MD   tiZANidine (ZANAFLEX) 4 MG tablet TAKE ONE TABLET BY MOUTH EVERY 8 HOURS AS NEEDED FOR BACK PAIN 3/31/21   Armando Crum MD   bumetanide (BUMEX) 1 MG tablet TAKE THREE TABLETS BY MOUTH TWICE A DAY 3/8/21   Armando Crum MD   spironolactone (ALDACTONE) 50 MG tablet Take 1 tablet by mouth daily 2/24/21   Armando Crum MD   clobetasol (TEMOVATE) 0.05 % ointment Apply topically 2 times daily for psoriasis 1/27/21   Armando Crum MD   ketoconazole (NIZORAL) 2 % cream Apply twice a day for yeast infection in the skin folds, for 4 weeks 1/20/21   Armando Crum MD   albuterol (PROVENTIL) (2.5 MG/3ML) 0.083% nebulizer solution Take 3 mLs by nebulization every 6 hours as needed for Wheezing or Shortness of Breath 1/15/21   Armando Crum MD   Ferrous Sulfate (IRON) 325 (65 Fe) MG TABS Take 1 tablet by mouth daily 11/25/20   Armando Crum MD   metoprolol tartrate (LOPRESSOR) 50 MG tablet Take 1 tablet by mouth 2 times daily 11/4/20   Armando Crum MD magnesium oxide (MAG-OX) 400 (240 Mg) MG tablet Take 1 tablet by mouth daily 11/4/20   Walker Tuttle MD   lisinopril (PRINIVIL;ZESTRIL) 5 MG tablet Take 1 tablet by mouth daily . Discontinued Lisinopril  10 mg 11/2/20   Walker Tuttle MD   fluticasone-salmeterol (ADVAIR HFA) 230-21 MCG/ACT inhaler Inhale 2 puffs into the lungs 2 times daily 10/19/20   Walker Tuttle MD   tiotropium (SPIRIVA RESPIMAT) 2.5 MCG/ACT AERS inhaler Inhale 2 puffs into the lungs daily 10/19/20   Walker Tuttle MD   venlafaxine (EFFEXOR XR) 75 MG extended release capsule Take 1 capsule by mouth daily Take with food 10/14/20   Walker Tuttle MD   blood glucose monitor strips Test 3 times a day & as needed for symptoms of irregular blood glucose. Dispense sufficient amount for indicated testing frequency plus additional to accommodate PRN testing needs. One touch Ultra blue 9/30/20   Ryann Banks MD   Lancets MISC Use to check blood sugar three times daily along with when necessary due to symptoms. 9/30/20   Ryann Banks MD   vitamin B-12 (CYANOCOBALAMIN) 500 MCG tablet Take 1 tablet by mouth daily 7/13/20   Walker Tuttle MD   Oxygen Tubing MISC by Does not apply route DX COPD. chronic respiratory failure 3/26/20   Walker Tuttle MD   Respiratory Therapy Supplies (NEBULIZER/TUBING/MOUTHPIECE) KIT Dx COPD needs nebulizer supplies 2/20/20   Walker Tuttle MD   ONE TOUCH ULTRASOFT LANCETS [address] [facility] Patient to test blood sugar up to 4 times daily. 11/7/19   Ryann Banks MD   Lancet Devices (LANCING DEVICE) MISC Provide patient with lancing device appropriate for his machine/lancing needles. 6/4/19   Walker Tuttle MD   Lidocaine 4 % LOTN Apply topically    Historical Provider, MD   Handicap Placard MISC by Does not apply route Can't walk greater than 200 feet. Expires in 5 years.  2/13/19   Paige Bryant MD   fluticasone (CUTIVATE) 0.05 % cream Apply topically 2 times daily  4/19/17 Historical Provider, MD   fluticasone (FLONASE) 50 MCG/ACT nasal spray 2 sprays by Nasal route daily  Patient taking differently: 2 sprays by Nasal route daily as needed (sinus symptoms)  1/16/17   Alvin Rodriguez MD   Melatonin 10 MG TABS Take 10 mg by mouth nightly as needed (insomnia) 12/23/16   Alvin Rodriguez MD   aspirin 81 MG EC tablet Take 81 mg by mouth daily. Historical Provider, MD       Immunizations:   Most Recent Immunizations   Administered Date(s) Administered    COVID-19, Moderna, PF, 100mcg/0.5mL 05/27/2021    DT (pediatric) 12/14/1998    Hepatitis B Adult (Engerix-B) 12/04/2019    Hepatitis B Adult (Recombivax HB) 12/04/2019    Influenza Virus Vaccine 10/12/2015    Influenza, Quadv, IM, PF (6 mo and older Fluzone, Flulaval, Fluarix, and 3 yrs and older Afluria) 10/09/2020    Pneumococcal Polysaccharide ([id number]) 05/23/2013    Tdap (Boostrix, Adacel) 09/12/2018       Home Blood Glucose Results:   Per patient's [facility] report below                   Assessment     A1c at goal:No: 7.2 (8/30/21) (decreasing)  Blood Pressure at goal: 132/78 on 9/21/21  Weight at goal: No:    Physical activity: No  Physical activity at goal: No  Patient encouraged but unable to due SOB. Smoking Cessation: Yes    Cholesterol at target: Yes  Annual eye exam completed: Yes  Comprehensive Foot Exam Completed:  Yes  Annual urine albumin and serum creatinine: Yes    Statin: Yes    Appropriate?: Yes  Changes made: refill provided    ACE/ARB: Yes  Appropriate?: Yes  Changes made:     Aspirin: Yes  Appropriate?: Yes  Changes made:     Eating patterns:    [x]  My plate    []  Mediterranean diet   []  Low sodium   []  DASH diet   [x]  Portion control   [x]  Reduced calorie    []  Fast food / Restaurant  []  High glycemic index foods   []  Sugary beverages   []  Other     Comment: see above    Current Medications Affecting Diabetes:  Humulin R 500    Compliant:No  Barriers to medication therapy: anxiety / depression    Medications attempted in the past:  Metformin - cardiologist took him off but patient unsure why  Januvia - PCP took him off but patient unsure why    Recent exacerbations / new problems:  Gout / Chest pain/pressure    Last office dictation reviewed: yes        type 2 DM under improving control as evidenced by A1C 7.2 on 8/30/21    Plan   Counseling at today's visit:     -Continued monitoring of blood glucose with use of Freestyle Lianne. Call with any issues with sensor. Bring data cord to each visit. Change dose of insulin:Continued 0.52ml with breakfast; Increase 0.55ml with lunch and reduced to 0.40ml with dinner. Patient has log to document time and what he eats as far as meals given to him at previous visit. Will compare blood sugars around meals to determine need for adjustment. Asked to complete information for one meal a day for two weeks prior to next appt. Resume allopurinol    Call cardiologist for appt. Physician Follow-up:   Dr Jenny Goldsmith     Medication Management Follow-up:   Diabetes Service   4 weeks in person or sooner  Via phone if further hypoglycemia. Electronically signed by Jonathan Velasquez RPH on 9/22/2021 at 7:34 AM     For Pharmacy [id number] in place: Yes   Recommendation Provided To: Patient/Caregiver: 4 via In person   Intervention Detail: Dose Adjustment: 1, reason: Therapy Optimization, New Rx: 1, reason: Needs Additional Therapy, Refill(s) Provided and Scheduled Appointment   Intervention Accepted By: Patient/Caregiver: 4   Time Spent (min): 45     Yesenia Rizvi RP,Pharm. D,, BCPS, CACP  9/22/2021  8:55 AM

## 2021-09-23 ENCOUNTER — CARE COORDINATION (OUTPATIENT)
Dept: CASE MANAGEMENT | Age: 56
End: 2021-09-23

## 2021-09-23 NOTE — CARE COORDINATION
[facility] Transitions Follow Up Call    2021    Patient: Liana Booth. Patient : 1965   MRN: [id number]  Reason for Admission: [place] - no new obstructive CAD per cath  Discharge Date: 21 RARS: Readmission Risk Score: 39         Spoke with: Miguel he is doing okay he does sound down and depressed he states he has chest pain everyday as well as SOB he has had BS below 40 3 times in the last 2 days with lethargy he is on a new diet and is seeing medication management. Upon further talking he admitted to depression states he will deal with it himself. CTN assured patient we can find him someone to discuss this with he declined. He has no other concerns at present time. Care Transitions Follow Up Call    Needs to be reviewed by the provider   Additional needs identified to be addressed with provider: Yes  Blood glucose has been below 40 3 times in 2 days             Method of communication with provider : chart routing      Care Transition Nurse (CTN) contacted the patient by telephone to follow up after admission on . Verified name and  with patient as identifiers. Addressed changes since last contact: refused-low BS 40 noted 3 times in 2 days and declined help for depression  Discussed follow-up appointments. If no appointment was previously scheduled, appointment scheduling offered: Yes. Is follow up appointment scheduled within 7 days of discharge? Yes. Advance Care Planning:   Does patient have an Advance Directive: reviewed and needs to be updated, not on file; education provided, not on file, patient declined education, decision maker updated and referral to internal ACP facilitator. CTN reviewed discharge instructions, medical action plan and red flags with patient and discussed any barriers to care and/or understanding of plan of care after discharge.  Discussed appropriate site of care based on symptoms and resources available to patient including: PCP and Specialist. The patient agrees to contact the PCP office for questions related to their healthcare. Patients top risk factors for readmission: depression, financial, ineffective coping, lack of knowledge about disease, level of motivation and medication management  Interventions to address risk factors: Obtained and reviewed discharge summary and/or continuity of care documents          CTN provided contact information for future needs. Plan for follow-up call in 3-5 days based on severity of symptoms and risk factors. Plan for next call: symptom management-depression and Blood sugar          Care Transitions Subsequent and Final Call    Subsequent and Final Calls  Do you have any ongoing symptoms?: Yes  Patient-reported symptoms: Shortness of Breath, Weakness  Interventions for patient-reported symptoms: Other  Have your medications changed?: No  Do you have any questions related to your medications?: No  Do you currently have any active services?: No  Do you have any needs or concerns that I can assist you with?: No  Identified Barriers: Lack of Education, Other  Care Transitions Interventions  Other Interventions:            Follow Up  Future Appointments   Date Time Provider William Hernandez   10/19/2021  7:10 AM [facility] MEDICATION MGMT [facility]   10/21/2021  9:00 AM [facility]   11/9/2021  8:40 AM Sheri Henry MD Neuro Spec [facility]   3/17/2022  8:00 AM Awilda Ramirez MD [facility]   3/22/2022 10:00 AM MD Armen Javed LPN

## 2021-09-29 NOTE — PROGRESS NOTES
Physician Progress Note      PATIENT:               Wei Bocanegra  CSN #:                  [id number]  :                       1965  ADMIT DATE:       2021 12:01 AM  DISCH DATE:        2021 3:13 PM  RESPONDING  PROVIDER #:        Cady Ramsey MD          QUERY TEXT:    Patient admitted with Chest pain/Unstable Angina. Noted documentation of   NSTEMI per Nephro Consult note with no further mention of NSTEMI. please   document if the diagnosis of NSTEMI was present or  has been ruled out after   further study. The medical record reflects the following:  Risk Factors: CAD w/ prior stents  Clinical Indicators:Pt admitted with chest pain. Per Nephro consult note    ' NSTEMI with significant cardiac history including stent placement in the   past.  Cardiology planning for cardiac cath on . 'Per Cardiology consult   'Chest pain/unstable angina'. Per Cardiac Cath OP note  ' Mild   non-obstructive CAD . No change from last angiogram in 2018 '. Per DC Summary   ' Unstable Angina. He had no new obstructive CAD.' Trops [id number]. Treatment: Cardiac cath, Nitro/Heparin gtt, aspirin, Plavix, statin and   beta-blocker. Options provided:  -- NSTEMI present as evidenced by, Please document evidence. -- NSTEMI was ruled out  -- Other - I will add my own diagnosis  -- Disagree - Not applicable / Not valid  -- Disagree - Clinically unable to determine / Unknown  -- Refer to Clinical Documentation Reviewer    PROVIDER RESPONSE TEXT:    NSTEMI was ruled out after study.     Query created by: Dorota Sales on 2021 3:01 PM      Electronically signed by:  Cady Ramsey MD 2021 12:30 PM

## 2021-09-30 ENCOUNTER — TELEPHONE (OUTPATIENT)
Dept: FAMILY MEDICINE CLINIC | Age: 56
End: 2021-09-30

## 2021-09-30 ENCOUNTER — APPOINTMENT (OUTPATIENT)
Dept: GENERAL RADIOLOGY | Age: 56
DRG: 280 | End: 2021-09-30
Payer: COMMERCIAL

## 2021-09-30 ENCOUNTER — TELEPHONE (OUTPATIENT)
Dept: OTHER | Facility: CLINIC | Age: 56
End: 2021-09-30

## 2021-09-30 ENCOUNTER — HOSPITAL ENCOUNTER (INPATIENT)
Age: 56
LOS: 4 days | Discharge: HOME | DRG: 280 | End: 2021-10-04
Attending: STUDENT IN AN ORGANIZED HEALTH CARE EDUCATION/TRAINING PROGRAM | Admitting: INTERNAL MEDICINE
Payer: COMMERCIAL

## 2021-09-30 ENCOUNTER — VIRTUAL VISIT (OUTPATIENT)
Dept: FAMILY MEDICINE CLINIC | Age: 56
End: 2021-09-30

## 2021-09-30 ENCOUNTER — CARE COORDINATION (OUTPATIENT)
Dept: CASE MANAGEMENT | Age: 56
End: 2021-09-30

## 2021-09-30 DIAGNOSIS — I50.32 CHRONIC DIASTOLIC CONGESTIVE HEART FAILURE (HCC): ICD-10-CM

## 2021-09-30 DIAGNOSIS — E11.69 DIABETES MELLITUS TYPE 2 IN OBESE (HCC): ICD-10-CM

## 2021-09-30 DIAGNOSIS — I21.4 NSTEMI (NON-ST ELEVATED MYOCARDIAL INFARCTION) (HCC): Primary | ICD-10-CM

## 2021-09-30 DIAGNOSIS — D64.9 ANEMIA, NORMOCYTIC NORMOCHROMIC: ICD-10-CM

## 2021-09-30 DIAGNOSIS — J96.21 ACUTE ON CHRONIC RESPIRATORY FAILURE WITH HYPOXIA (HCC): Primary | ICD-10-CM

## 2021-09-30 DIAGNOSIS — E66.9 DIABETES MELLITUS TYPE 2 IN OBESE (HCC): ICD-10-CM

## 2021-09-30 DIAGNOSIS — B34.9 ACUTE VIRAL SYNDROME: ICD-10-CM

## 2021-09-30 DIAGNOSIS — N17.9 ACUTE KIDNEY INJURY (HCC): ICD-10-CM

## 2021-09-30 LAB
ABSOLUTE EOS #: 0.1 K/UL (ref 0–0.4)
ABSOLUTE IMMATURE GRANULOCYTE: ABNORMAL K/UL (ref 0–0.3)
ABSOLUTE LYMPH #: 0.6 K/UL (ref 1–4.8)
ABSOLUTE MONO #: 0.6 K/UL (ref 0.1–1.3)
ALBUMIN SERPL-MCNC: 3.5 G/DL (ref 3.5–5.2)
ALBUMIN/GLOBULIN RATIO: ABNORMAL (ref 1–2.5)
ALLEN TEST: ABNORMAL
ALP BLD-CCNC: 85 U/L (ref 40–129)
ALT SERPL-CCNC: 23 U/L (ref 5–41)
ANION GAP SERPL CALCULATED.3IONS-SCNC: 13 MMOL/L (ref 9–17)
AST SERPL-CCNC: 14 U/L
BASOPHILS # BLD: 0 % (ref 0–2)
BASOPHILS ABSOLUTE: 0 K/UL (ref 0–0.2)
BILIRUB SERPL-MCNC: 0.76 MG/DL (ref 0.3–1.2)
BUN BLDV-MCNC: 66 MG/DL (ref 6–20)
BUN/CREAT BLD: ABNORMAL (ref 9–20)
CALCIUM SERPL-MCNC: 9.3 MG/DL (ref 8.6–10.4)
CARBOXYHEMOGLOBIN: 4.9 % (ref 0–5)
CHLORIDE BLD-SCNC: 99 MMOL/L (ref 98–107)
CO2: 23 MMOL/L (ref 20–31)
CREAT SERPL-MCNC: 2.41 MG/DL (ref 0.7–1.2)
D-DIMER QUANTITATIVE: 1.69 MG/L FEU (ref 0–0.59)
DIFFERENTIAL TYPE: ABNORMAL
EOSINOPHILS RELATIVE PERCENT: 2 % (ref 0–4)
FIO2: ABNORMAL
GFR AFRICAN AMERICAN: 34 ML/MIN
GFR NON-AFRICAN AMERICAN: 28 ML/MIN
GFR SERPL CREATININE-BSD FRML MDRD: ABNORMAL ML/MIN/{1.73_M2}
GFR SERPL CREATININE-BSD FRML MDRD: ABNORMAL ML/MIN/{1.73_M2}
GLUCOSE BLD-MCNC: 205 MG/DL (ref 70–99)
HCO3 VENOUS: 26.3 MMOL/L (ref 24–30)
HCT VFR BLD CALC: 24.7 % (ref 41–53)
HEMOGLOBIN: 7.8 G/DL (ref 13.5–17.5)
IMMATURE GRANULOCYTES: ABNORMAL %
LYMPHOCYTES # BLD: 9 % (ref 24–44)
MCH RBC QN AUTO: 26.4 PG (ref 26–34)
MCHC RBC AUTO-ENTMCNC: 31.7 G/DL (ref 31–37)
MCV RBC AUTO: 83.1 FL (ref 80–100)
METHEMOGLOBIN: 0 % (ref 0–1.9)
MODE: ABNORMAL
MONOCYTES # BLD: 8 % (ref 1–7)
NEGATIVE BASE EXCESS, VEN: ABNORMAL MMOL/L (ref 0–2)
NOTIFICATION TIME: ABNORMAL
NOTIFICATION: ABNORMAL
NRBC AUTOMATED: ABNORMAL PER 100 WBC
O2 DEVICE/FLOW/%: ABNORMAL
O2 SAT, VEN: 94.5 % (ref 60–85)
OXYHEMOGLOBIN: ABNORMAL % (ref 95–98)
PARTIAL THROMBOPLASTIN TIME: 31.6 SEC (ref 24–36)
PATIENT TEMP: 37
PCO2, VEN, TEMP ADJ: ABNORMAL MMHG (ref 39–55)
PCO2, VEN: 40.9 (ref 39–55)
PDW BLD-RTO: 17.7 % (ref 11.5–14.9)
PEEP/CPAP: ABNORMAL
PH VENOUS: 7.42 (ref 7.32–7.42)
PH, VEN, TEMP ADJ: ABNORMAL (ref 7.32–7.42)
PLATELET # BLD: 170 K/UL (ref 150–450)
PLATELET ESTIMATE: ABNORMAL
PMV BLD AUTO: 8.6 FL (ref 6–12)
PO2, VEN, TEMP ADJ: ABNORMAL MMHG (ref 30–50)
PO2, VEN: 126 (ref 30–50)
POSITIVE BASE EXCESS, VEN: 1.7 MMOL/L (ref 0–2)
POTASSIUM SERPL-SCNC: 5.3 MMOL/L (ref 3.7–5.3)
PSV: ABNORMAL
PT. POSITION: ABNORMAL
RBC # BLD: 2.97 M/UL (ref 4.5–5.9)
RBC # BLD: ABNORMAL 10*6/UL
RESPIRATORY RATE: ABNORMAL
SAMPLE SITE: ABNORMAL
SARS-COV-2, RAPID: NOT DETECTED
SEG NEUTROPHILS: 81 % (ref 36–66)
SEGMENTED NEUTROPHILS ABSOLUTE COUNT: 5.7 K/UL (ref 1.3–9.1)
SET RATE: ABNORMAL
SODIUM BLD-SCNC: 135 MMOL/L (ref 135–144)
SPECIMEN DESCRIPTION: NORMAL
TEXT FOR RESPIRATORY: ABNORMAL
TOTAL HB: ABNORMAL G/DL (ref 12–16)
TOTAL PROTEIN: 7.3 G/DL (ref 6.4–8.3)
TOTAL RATE: ABNORMAL
TROPONIN INTERP: ABNORMAL
TROPONIN INTERP: ABNORMAL
TROPONIN T: ABNORMAL NG/ML
TROPONIN T: ABNORMAL NG/ML
TROPONIN, HIGH SENSITIVITY: 127 NG/L (ref 0–22)
TROPONIN, HIGH SENSITIVITY: 130 NG/L (ref 0–22)
VT: ABNORMAL
WBC # BLD: 7 K/UL (ref 3.5–11)
WBC # BLD: ABNORMAL 10*3/UL

## 2021-09-30 PROCEDURE — 84484 ASSAY OF TROPONIN QUANT: CPT

## 2021-09-30 PROCEDURE — 6360000002 HC RX W HCPCS

## 2021-09-30 PROCEDURE — 71045 X-RAY EXAM CHEST 1 VIEW: CPT

## 2021-09-30 PROCEDURE — 6360000002 HC RX W HCPCS: Performed by: NURSE PRACTITIONER

## 2021-09-30 PROCEDURE — 6360000002 HC RX W HCPCS: Performed by: STUDENT IN AN ORGANIZED HEALTH CARE EDUCATION/TRAINING PROGRAM

## 2021-09-30 PROCEDURE — 99285 EMERGENCY DEPT VISIT HI MDM: CPT

## 2021-09-30 PROCEDURE — 87635 SARS-COV-2 COVID-19 AMP PRB: CPT

## 2021-09-30 PROCEDURE — 80053 COMPREHEN METABOLIC PANEL: CPT

## 2021-09-30 PROCEDURE — 93005 ELECTROCARDIOGRAM TRACING: CPT | Performed by: STUDENT IN AN ORGANIZED HEALTH CARE EDUCATION/TRAINING PROGRAM

## 2021-09-30 PROCEDURE — 82800 BLOOD PH: CPT

## 2021-09-30 PROCEDURE — 2060000000 HC ICU INTERMEDIATE R&B

## 2021-09-30 PROCEDURE — 82805 BLOOD GASES W/O2 SATURATION: CPT

## 2021-09-30 PROCEDURE — 85730 THROMBOPLASTIN TIME PARTIAL: CPT

## 2021-09-30 PROCEDURE — 85379 FIBRIN DEGRADATION QUANT: CPT

## 2021-09-30 PROCEDURE — 36415 COLL VENOUS BLD VENIPUNCTURE: CPT

## 2021-09-30 PROCEDURE — 85025 COMPLETE CBC W/AUTO DIFF WBC: CPT

## 2021-09-30 RX ORDER — HEPARIN SODIUM 1000 [USP'U]/ML
4000 INJECTION, SOLUTION INTRAVENOUS; SUBCUTANEOUS PRN
Status: DISCONTINUED | OUTPATIENT
Start: 2021-09-30 | End: 2021-10-02

## 2021-09-30 RX ORDER — ASPIRIN 81 MG/1
81 TABLET ORAL DAILY
Status: DISCONTINUED | OUTPATIENT
Start: 2021-10-01 | End: 2021-10-04 | Stop reason: HOSPADM

## 2021-09-30 RX ORDER — VENLAFAXINE HYDROCHLORIDE 75 MG/1
75 CAPSULE, EXTENDED RELEASE ORAL DAILY
Status: DISCONTINUED | OUTPATIENT
Start: 2021-10-01 | End: 2021-10-04 | Stop reason: HOSPADM

## 2021-09-30 RX ORDER — ACETAMINOPHEN 650 MG/1
650 SUPPOSITORY RECTAL EVERY 6 HOURS PRN
Status: DISCONTINUED | OUTPATIENT
Start: 2021-09-30 | End: 2021-10-04 | Stop reason: HOSPADM

## 2021-09-30 RX ORDER — HEPARIN SODIUM 1000 [USP'U]/ML
4000 INJECTION, SOLUTION INTRAVENOUS; SUBCUTANEOUS ONCE
Status: COMPLETED | OUTPATIENT
Start: 2021-09-30 | End: 2021-09-30

## 2021-09-30 RX ORDER — GUAIFENESIN 600 MG/1
1200 TABLET, EXTENDED RELEASE ORAL 2 TIMES DAILY
Status: DISCONTINUED | OUTPATIENT
Start: 2021-09-30 | End: 2021-10-04 | Stop reason: HOSPADM

## 2021-09-30 RX ORDER — METOPROLOL TARTRATE 50 MG/1
50 TABLET, FILM COATED ORAL 2 TIMES DAILY
Status: DISCONTINUED | OUTPATIENT
Start: 2021-10-01 | End: 2021-10-03

## 2021-09-30 RX ORDER — LATANOPROST 50 UG/ML
1 SOLUTION/ DROPS OPHTHALMIC NIGHTLY
Status: DISCONTINUED | OUTPATIENT
Start: 2021-10-01 | End: 2021-10-04 | Stop reason: HOSPADM

## 2021-09-30 RX ORDER — ALBUTEROL SULFATE 2.5 MG/3ML
2.5 SOLUTION RESPIRATORY (INHALATION)
Status: DISCONTINUED | OUTPATIENT
Start: 2021-09-30 | End: 2021-10-04 | Stop reason: HOSPADM

## 2021-09-30 RX ORDER — LISINOPRIL 5 MG/1
5 TABLET ORAL DAILY
Status: DISCONTINUED | OUTPATIENT
Start: 2021-10-01 | End: 2021-10-02

## 2021-09-30 RX ORDER — BUTALBITAL, ACETAMINOPHEN AND CAFFEINE 300; 40; 50 MG/1; MG/1; MG/1
1 CAPSULE ORAL EVERY 8 HOURS PRN
Status: DISCONTINUED | OUTPATIENT
Start: 2021-09-30 | End: 2021-10-04 | Stop reason: HOSPADM

## 2021-09-30 RX ORDER — CHOLECALCIFEROL (VITAMIN D3) 125 MCG
500 CAPSULE ORAL DAILY
Status: DISCONTINUED | OUTPATIENT
Start: 2021-10-01 | End: 2021-10-04 | Stop reason: HOSPADM

## 2021-09-30 RX ORDER — IPRATROPIUM BROMIDE AND ALBUTEROL SULFATE 2.5; .5 MG/3ML; MG/3ML
1 SOLUTION RESPIRATORY (INHALATION)
Status: DISCONTINUED | OUTPATIENT
Start: 2021-10-01 | End: 2021-10-04 | Stop reason: HOSPADM

## 2021-09-30 RX ORDER — VITAMIN B COMPLEX
1000 TABLET ORAL DAILY
Status: DISCONTINUED | OUTPATIENT
Start: 2021-10-01 | End: 2021-10-04 | Stop reason: HOSPADM

## 2021-09-30 RX ORDER — SODIUM CHLORIDE 9 MG/ML
25 INJECTION, SOLUTION INTRAVENOUS PRN
Status: DISCONTINUED | OUTPATIENT
Start: 2021-09-30 | End: 2021-10-04 | Stop reason: HOSPADM

## 2021-09-30 RX ORDER — METHYLPREDNISOLONE SODIUM SUCCINATE 125 MG/2ML
INJECTION, POWDER, LYOPHILIZED, FOR SOLUTION INTRAMUSCULAR; INTRAVENOUS
Status: DISCONTINUED
Start: 2021-09-30 | End: 2021-10-01

## 2021-09-30 RX ORDER — HEPARIN SODIUM 10000 [USP'U]/100ML
5-30 INJECTION, SOLUTION INTRAVENOUS CONTINUOUS
Status: DISCONTINUED | OUTPATIENT
Start: 2021-09-30 | End: 2021-10-02

## 2021-09-30 RX ORDER — PANTOPRAZOLE SODIUM 40 MG/1
40 TABLET, DELAYED RELEASE ORAL
Status: DISCONTINUED | OUTPATIENT
Start: 2021-10-01 | End: 2021-10-04 | Stop reason: HOSPADM

## 2021-09-30 RX ORDER — METHYLPREDNISOLONE SODIUM SUCCINATE 125 MG/2ML
125 INJECTION, POWDER, LYOPHILIZED, FOR SOLUTION INTRAMUSCULAR; INTRAVENOUS ONCE
Status: COMPLETED | OUTPATIENT
Start: 2021-10-01 | End: 2021-09-30

## 2021-09-30 RX ORDER — FUROSEMIDE 10 MG/ML
40 INJECTION INTRAMUSCULAR; INTRAVENOUS ONCE
Status: COMPLETED | OUTPATIENT
Start: 2021-09-30 | End: 2021-09-30

## 2021-09-30 RX ORDER — ESCITALOPRAM OXALATE 10 MG/1
10 TABLET ORAL DAILY
Status: DISCONTINUED | OUTPATIENT
Start: 2021-10-01 | End: 2021-10-04 | Stop reason: HOSPADM

## 2021-09-30 RX ORDER — MORPHINE SULFATE 2 MG/ML
4 INJECTION, SOLUTION INTRAMUSCULAR; INTRAVENOUS ONCE
Status: COMPLETED | OUTPATIENT
Start: 2021-09-30 | End: 2021-09-30

## 2021-09-30 RX ORDER — NITROGLYCERIN 0.4 MG/1
0.4 TABLET SUBLINGUAL EVERY 5 MIN PRN
Status: DISCONTINUED | OUTPATIENT
Start: 2021-09-30 | End: 2021-10-04 | Stop reason: HOSPADM

## 2021-09-30 RX ORDER — GABAPENTIN 300 MG/1
300 CAPSULE ORAL 2 TIMES DAILY
Status: DISCONTINUED | OUTPATIENT
Start: 2021-10-01 | End: 2021-10-04 | Stop reason: HOSPADM

## 2021-09-30 RX ORDER — OXYCODONE HYDROCHLORIDE 10 MG/1
10 TABLET ORAL EVERY 8 HOURS PRN
Status: DISCONTINUED | OUTPATIENT
Start: 2021-09-30 | End: 2021-10-04 | Stop reason: HOSPADM

## 2021-09-30 RX ORDER — SODIUM CHLORIDE 0.9 % (FLUSH) 0.9 %
5-40 SYRINGE (ML) INJECTION EVERY 12 HOURS SCHEDULED
Status: DISCONTINUED | OUTPATIENT
Start: 2021-10-01 | End: 2021-10-04 | Stop reason: HOSPADM

## 2021-09-30 RX ORDER — SPIRONOLACTONE 25 MG/1
50 TABLET ORAL
Status: DISCONTINUED | OUTPATIENT
Start: 2021-10-01 | End: 2021-10-04 | Stop reason: HOSPADM

## 2021-09-30 RX ORDER — ONDANSETRON 2 MG/ML
4 INJECTION INTRAMUSCULAR; INTRAVENOUS EVERY 6 HOURS PRN
Status: DISCONTINUED | OUTPATIENT
Start: 2021-09-30 | End: 2021-10-04 | Stop reason: HOSPADM

## 2021-09-30 RX ORDER — AMITRIPTYLINE HYDROCHLORIDE 50 MG/1
50 TABLET, FILM COATED ORAL NIGHTLY
Status: DISCONTINUED | OUTPATIENT
Start: 2021-10-01 | End: 2021-10-04 | Stop reason: HOSPADM

## 2021-09-30 RX ORDER — POLYETHYLENE GLYCOL 3350 17 G/17G
17 POWDER, FOR SOLUTION ORAL DAILY PRN
Status: DISCONTINUED | OUTPATIENT
Start: 2021-09-30 | End: 2021-10-04 | Stop reason: HOSPADM

## 2021-09-30 RX ORDER — TIZANIDINE 4 MG/1
4 TABLET ORAL EVERY 8 HOURS PRN
Status: DISCONTINUED | OUTPATIENT
Start: 2021-09-30 | End: 2021-10-04 | Stop reason: HOSPADM

## 2021-09-30 RX ORDER — DOCUSATE SODIUM 100 MG/1
100 CAPSULE, LIQUID FILLED ORAL 2 TIMES DAILY
Status: DISCONTINUED | OUTPATIENT
Start: 2021-10-01 | End: 2021-10-04 | Stop reason: HOSPADM

## 2021-09-30 RX ORDER — CLOPIDOGREL BISULFATE 75 MG/1
75 TABLET ORAL DAILY
Status: DISCONTINUED | OUTPATIENT
Start: 2021-10-01 | End: 2021-10-04 | Stop reason: HOSPADM

## 2021-09-30 RX ORDER — HEPARIN SODIUM 1000 [USP'U]/ML
2000 INJECTION, SOLUTION INTRAVENOUS; SUBCUTANEOUS PRN
Status: DISCONTINUED | OUTPATIENT
Start: 2021-09-30 | End: 2021-10-02

## 2021-09-30 RX ORDER — LANOLIN ALCOHOL/MO/W.PET/CERES
9 CREAM (GRAM) TOPICAL NIGHTLY PRN
Status: DISCONTINUED | OUTPATIENT
Start: 2021-09-30 | End: 2021-10-04 | Stop reason: HOSPADM

## 2021-09-30 RX ORDER — ALLOPURINOL 100 MG/1
100 TABLET ORAL DAILY
Status: DISCONTINUED | OUTPATIENT
Start: 2021-10-01 | End: 2021-10-04 | Stop reason: HOSPADM

## 2021-09-30 RX ORDER — SODIUM CHLORIDE 0.9 % (FLUSH) 0.9 %
5-40 SYRINGE (ML) INJECTION PRN
Status: DISCONTINUED | OUTPATIENT
Start: 2021-09-30 | End: 2021-10-04 | Stop reason: HOSPADM

## 2021-09-30 RX ORDER — BUDESONIDE AND FORMOTEROL FUMARATE DIHYDRATE 160; 4.5 UG/1; UG/1
2 AEROSOL RESPIRATORY (INHALATION) 2 TIMES DAILY
Status: DISCONTINUED | OUTPATIENT
Start: 2021-10-01 | End: 2021-10-04 | Stop reason: HOSPADM

## 2021-09-30 RX ORDER — ACETAMINOPHEN 325 MG/1
650 TABLET ORAL EVERY 6 HOURS PRN
Status: DISCONTINUED | OUTPATIENT
Start: 2021-09-30 | End: 2021-10-04 | Stop reason: HOSPADM

## 2021-09-30 RX ORDER — ROSUVASTATIN CALCIUM 10 MG/1
10 TABLET, COATED ORAL NIGHTLY
Status: DISCONTINUED | OUTPATIENT
Start: 2021-10-01 | End: 2021-10-04 | Stop reason: HOSPADM

## 2021-09-30 RX ORDER — FERROUS SULFATE 325(65) MG
325 TABLET ORAL DAILY
Status: DISCONTINUED | OUTPATIENT
Start: 2021-10-01 | End: 2021-10-04 | Stop reason: HOSPADM

## 2021-09-30 RX ORDER — ONDANSETRON 4 MG/1
4 TABLET, ORALLY DISINTEGRATING ORAL EVERY 8 HOURS PRN
Status: DISCONTINUED | OUTPATIENT
Start: 2021-09-30 | End: 2021-10-04 | Stop reason: HOSPADM

## 2021-09-30 RX ADMIN — METHYLPREDNISOLONE SODIUM SUCCINATE 125 MG: 125 INJECTION, POWDER, LYOPHILIZED, FOR SOLUTION INTRAMUSCULAR; INTRAVENOUS at 23:45

## 2021-09-30 RX ADMIN — METHYLPREDNISOLONE SODIUM SUCCINATE 125 MG: 125 INJECTION, POWDER, FOR SOLUTION INTRAMUSCULAR; INTRAVENOUS at 23:45

## 2021-09-30 RX ADMIN — Medication 4 MG: at 22:31

## 2021-09-30 RX ADMIN — FUROSEMIDE 40 MG: 10 INJECTION, SOLUTION INTRAMUSCULAR; INTRAVENOUS at 21:53

## 2021-09-30 RX ADMIN — HEPARIN SODIUM 4000 UNITS: 1000 INJECTION, SOLUTION INTRAVENOUS; SUBCUTANEOUS at 21:53

## 2021-09-30 RX ADMIN — HEPARIN SODIUM 5.3 UNITS/KG/HR: 10000 INJECTION, SOLUTION INTRAVENOUS at 21:54

## 2021-09-30 ASSESSMENT — PAIN DESCRIPTION - LOCATION
LOCATION: BACK
LOCATION: RIB CAGE

## 2021-09-30 ASSESSMENT — ENCOUNTER SYMPTOMS
NAUSEA: 0
VOMITING: 0
COLOR CHANGE: 0
FACIAL SWELLING: 0
COUGH: 0
DIARRHEA: 0
EYE REDNESS: 0
BACK PAIN: 0
SORE THROAT: 0
RHINORRHEA: 0
SHORTNESS OF BREATH: 1
EYE PAIN: 0
ABDOMINAL PAIN: 0

## 2021-09-30 ASSESSMENT — PAIN SCALES - GENERAL
PAINLEVEL_OUTOF10: 8
PAINLEVEL_OUTOF10: 3
PAINLEVEL_OUTOF10: 8

## 2021-09-30 ASSESSMENT — PAIN DESCRIPTION - PAIN TYPE
TYPE: ACUTE PAIN
TYPE: ACUTE PAIN

## 2021-09-30 NOTE — ED PROVIDER NOTES
EMERGENCY DEPARTMENT ENCOUNTER    Pt Name: Osman Rosa. MRN: [id number]  Birthdate 1965  Date of evaluation: 9/30/21  CHIEF COMPLAINT       Chief Complaint   Patient presents with    Nasal Congestion    Cough    Rib Pain    Shortness of Breath     HISTORY OF PRESENT ILLNESS   HPI  79-year-old male history of CKD, degenerative disc disease, chronic pancreatitis, diabetes, CHF, no left bundle branch block, BARBY presents for evaluation of shortness of breath. Symptoms progressed over the past 2 days. Patient has associated cough. Patient has no fever. Symptoms are moderate and progressive. Patient has associated bilateral anterior chest tightness. No nausea vomiting or abdominal pain. Lower extremity swelling is at baseline. No home treatment prior to arrival.  No other recent illness. Patient is vaccinated against Covid. REVIEW OF SYSTEMS     Review of Systems   Constitutional: Negative for chills and fatigue. HENT: Negative for facial swelling, nosebleeds, rhinorrhea and sore throat. Eyes: Negative for pain and redness. Respiratory: Positive for shortness of breath. Negative for cough. Cardiovascular: Positive for chest pain. Negative for leg swelling. Gastrointestinal: Negative for abdominal pain, diarrhea, nausea and vomiting. Genitourinary: Negative for flank pain and hematuria. Musculoskeletal: Negative for arthralgias and back pain. Skin: Negative for color change and rash. Neurological: Negative for dizziness, tremors, facial asymmetry, speech difficulty, weakness and numbness.      PASTMEDICAL HISTORY     Past Medical History:   Diagnosis Date    Acute on chronic kidney failure ([facility] [address] 07/20/2017    Acute on chronic respiratory failure ([facility] [address] 10/02/2018    Adhesive capsulitis of left shoulder 03/25/2017    Anxiety 10/02/2016    smokes marijuana for this    Arthropathy, unspecified, other specified sites 06/13/2013    Asthma     B12 deficiency     Bilateral lower leg cellulitis 02/17/2016    Blood in stool     CAD (coronary artery disease)     Cardiovascular stress test abnormal 2018    Cellulitis of both lower extremities 05/25/2017    Cellulitis of leg, left 07/20/2017    CHF (congestive heart failure), NYHA class III (HCC) 08/14/2013    Chronic back pain     Chronic bronchitis (HCC)     Chronic headaches     was referred to neuro, testing scheduled    Chronic respiratory failure ([address]     was on vent    Chronic ulcer of left leg, with fat layer exposed ([address] 02/22/2019    healed    Class 2 severe obesity due to excess calories with serious comorbidity and body mass index (BMI) of 35.0 to 35.9 in adult ([address]     (BMI 35.0-39.9 without comorbidity)    COPD exacerbation ([address] 11/02/2016    Diabetic neuropathy ([address] 08/14/2013    Displacement of lumbar intervertebral disc without myelopathy 06/13/2013    Ear infection     RIGHT    Essential hypertension     Facial cellulitis 2012    Fall 03/25/2017    GERD (gastroesophageal reflux disease)     Head injury     Hearing loss in right ear     pencil pierced ear as a child    Hepatic steatosis 12/03/2015    History of general anesthesia complication     has woke up during surgery under anesthesia    History of rib fracture 12/03/2015    Chronic     Hyperlipidemia     Hypersomnia     can go multiple days without sleeping    Hypertension     Insomnia     Intolerance of continuous positive airway pressure (CPAP) ventilation 07/20/2017    Iron deficiency     Localized rash     gets frequently in axilla, groin, in any fold, on several topical treatments for this    Magnesium deficiency     Mastoiditis of left side     Mixed conductive and sensorineural hearing loss of both ears 01/10/2017    Per ENT    Mixed type COPD (chronic obstructive pulmonary disease) ([address]     On home O2, multiple inhlaers, nebulizer    Moderate recurrent major depression ([address] 10/02/2016    Morbid obesity with BMI of 45.0-49.9, adult ([address] 06/16/2015    On home oxygen therapy     3 Lpm prn    Open wound of groin 12/19/2018    healed     BARBY on CPAP     Osteoarthritis     Otitis externa of left ear     Pancreatitis chronic     Persistent depressive disorder 11/19/2019    Renal insufficiency     proteinuria    Severe depression ([address] 09/25/2013    Spinal stenosis of lumbar region without neurogenic claudication 01/06/2016    MRI lumbar 12/30/15 L3-L4: There is broad-based bulging disc which appears protruding left laterally causing flattening of the ventral thecal sac. In addition, there is facet arthropathy with mild hypertrophic changes.  There is borderline central canal stenosis with  evidence of moderate left neural foraminal narrowing and mild right neural foramina narrowing.   L4-L5: There is broad-based protrud    Syncope 04/28/2017    Tinnitus of both ears 01/10/2017    Per ENT    Type 2 diabetes mellitus with stage 3 chronic kidney disease, with long-term current use of insulin ([facility] [address] 12/26/2016    due to underlying condition with hyperosmolarity without coma    Type II or unspecified type diabetes mellitus without mention of complication, not stated as uncontrolled     uncontrolled    Vitamin D deficiency     Wears glasses     for reading     Past Problem List  Patient Active Problem List   Diagnosis Code    DDD (degenerative disc disease), lumbar M51.36    Otitis externa H60.90    Hypertriglyceridemia E78.1    LIZETH (acute kidney injury) ([address] N17.9    Anemia of chronic renal failure, stage 3b ([address] N18.32, D63.1    CAD (coronary artery disease) I25.10    Chronic obstructive pulmonary disease ([address] J44.9    Type 2 diabetes mellitus with diabetic polyneuropathy, with long-term current use of insulin (HCC) E11.42, Z79.4    Chronic pancreatitis ([address] K86.1    Essential hypertension I10    Displacement of lumbar intervertebral disc without myelopathy M51.26    Uncontrolled type 2 diabetes mellitus with hyperglycemia (HCC) E11.65    Chronic diastolic congestive heart failure (HCC) I50.32    Insomnia G47.00    BPH (benign prostatic hyperplasia) N40.0    Morbid obesity with BMI of 50.0-59.9, adult ([facility]) E66.01, Z68.43    Lower abdominal pain R10.30    Hepatic steatosis K76.0    History of rib fracture M27.99    Umbilical hernia E90.4    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    Spinal stenosis of lumbar region without neurogenic claudication M48.061    Chronic back pain greater than 3 months duration M54.9, G89.29    Gastroesophageal reflux disease without esophagitis K21.9    Hyperlipidemia with target LDL less than 70 E78.5    Lower urinary tract symptoms (LUTS) R39.9    Vitamin D deficiency E55.9    Iron deficiency anemia due to chronic blood loss D50.0    BARBY treated with BiPAP G47.33    Chronic midline low back pain with left-sided sciatica M54.42, G89.29    Stasis dermatitis of both legs I87.2    Anxiety F41.9    Intertrigo axillary b/l L30.4    History of recurrent ear infection Z86.69    Mixed conductive and sensorineural hearing loss of both ears H90.6    Tinnitus of both ears H93.13    Slow transit constipation K59.01    Chronic infection of both external ears H60.63    Tinea pedis of both feet B35.3    Onychomycosis B35.1    Moderate episode of recurrent major depressive disorder ([facility]) F33.1    Hydrocele, bilateral N43.3    Xerosis cutis L85.3    Celiac artery stenosis ([facility]) I77.4    Myopia H52.10    Nuclear nonsenile cataract H26.9    Presbyopia H52.4    Postlaminectomy syndrome of lumbar region M96.1    Venous insufficiency of left lower extremity I87.2    Bilateral hearing loss H91.93    Seizures ([facility]) R56.9    Recurrent infection of skin L08.9    Vitamin B 12 deficiency E53.8    Mobility impaired Z74.09    Chronic respiratory failure with hypoxia ([facility]) J96.11    Chronic infective otitis externa H60.399    Hypomagnesemia E83.42    Chronic malignant otitis externa of both ears H60.23    Psoriasis L40.9    Tinea corporis B35.4    Colon cancer screening declined Z53.20    Excoriation T14. 8XXA    Lymphedema of both lower extremities I89.0    Venous insufficiency of both lower extremities I87.2    PVD (peripheral vascular disease) (HCC) I73.9    Chronic gout due to renal impairment without tophus M1A. 30X0    Chest pain R07.9    Unstable angina (HCC) I20.0    Acute on chronic congestive heart failure (HCC) I50.9    Elevated troponin R77.8    LBBB (left bundle branch block) I44.7    Anemia, normocytic normochromic D64.9    Acute on chronic respiratory failure with hypoxia ([facility] [address] J96.21     SURGICAL HISTORY       Past Surgical History:   Procedure Laterality Date    BACK SURGERY   (x 4) 2000,.12/2011.2/2012     Dr Do Dc last 2 surg    CARDIAC CATHETERIZATION  04/23/2018    PATENT OM STENT    COLONOSCOPY  11/03/2015    hemorrhoids, poor prep, not done    COLONOSCOPY  2013    COLONOSCOPY N/A 04/12/2021    COLONOSCOPY POLYPECTOMY SNARE/COLD BIOPSY/HOT BIOPSY/CLIP APPLICATION X1 performed by Nik Goldberg MD at [address] [facility]  03/2013    x 1    HAND TENDON SURGERY Left     thumb tendon repair    INTRACAPSULAR CATARACT EXTRACTION Right 11/05/2019    EYE CATARACT EMULSIFICATION IOL IMPLANT performed by Garry Hayward MD at [address] [place] [address] Left 01/07/2020    EYE CATARACT EMULSIFICATION IOL IMPLANT performed by Garry Hayward MD at [address] [facility] ARTHROSCOPY Left     NERVE BLOCK  07/31/2015    TENS unit    DC ESOPHAGOGASTRODUODENOSCOPY TRANSORAL DIAGNOSTIC N/A 07/18/2018    EGD ESOPHAGOGASTRODUODENOSCOPY performed by Nik Goldberg MD at [facility] Bilateral 09/20/2012    Dr Jus Jasmine       Previous Medications    ALBUTEROL (PROVENTIL) (2.5 MG/3ML) 0.083% NEBULIZER SOLUTION    Take 3 mLs by nebulization every 6 hours as needed for Wheezing or Shortness of Breath    ALLOPURINOL (ZYLOPRIM) 100 MG TABLET    Take 1 tablet by mouth daily FOR GOUT. STOP IT IF ANY RASH DEVELOPS! AMITRIPTYLINE (ELAVIL) 50 MG TABLET    Take 2 po qhs    AMMONIUM LACTATE (LAC-HYDRIN) 12 % CREAM    Apply topically to dry skin BID. ASPIRIN 81 MG EC TABLET    Take 81 mg by mouth daily. BLOOD GLUCOSE MONITOR STRIPS    Test 3 times a day & as needed for symptoms of irregular blood glucose. Dispense sufficient amount for indicated testing frequency plus additional to accommodate PRN testing needs. One touch Ultra blue    BUMETANIDE (BUMEX) 1 MG TABLET    TAKE THREE TABLETS BY MOUTH TWICE A DAY    BUTALBITAL-ACETAMINOPHEN-CAFFEINE (FIORICET, ESGIC) -40 MG PER TABLET    Take 1 tablet by mouth every 8 hours as needed for Headaches    CLOBETASOL (TEMOVATE) 0.05 % OINTMENT    Apply topically 2 times daily for psoriasis    CLOPIDOGREL (PLAVIX) 75 MG TABLET    Take 1 tablet by mouth daily    CONTINUOUS BLOOD GLUC SENSOR (FREESTYLE CRISTINE 14 DAY SENSOR) MISC    USE AS DIRECTED AND CHANGE EVERY 14 DAYS    CONTINUOUS BLOOD GLUC SENSOR (FREESTYLE CRISTINE 2 SENSOR) [facility] [place] [place]    Patient to apply a new sensor every 14 days. RX to cover voucher patient will bring in. DOCUSATE SODIUM (COLACE) 100 MG CAPSULE    Take 1 capsule by mouth 2 times daily For constipation    ESCITALOPRAM (LEXAPRO) 10 MG TABLET    Take 1 tablet by mouth daily    FERROUS SULFATE (IRON) 325 (65 FE) MG TABS    Take 1 tablet by mouth daily    FLUTICASONE (CUTIVATE) 0.05 % CREAM    Apply topically 2 times daily     FLUTICASONE (FLONASE) 50 MCG/ACT NASAL SPRAY    2 sprays by Nasal route daily    FLUTICASONE-SALMETEROL (ADVAIR HFA) 230-21 MCG/ACT INHALER    Inhale 2 puffs into the lungs 2 times daily    GABAPENTIN (NEURONTIN) 300 MG CAPSULE    Take 1 po bid    GAUZE BANDAGES (ROLLED GAUZE BANDAGE 4\"X2. 5YD) MISC    For twice a day dressing    GAUZE PADS & DRESSINGS 4\"X4\" PADS    Twice a day dressing of right leg wound    HANDICAP PLACARD MIS    by Does not apply route Can't walk greater than 200 feet. Expires in 5 years. INSULIN REGULAR HUMAN (HUMULIN R) 500 UNIT/ML CONCENTRATED INJECTION VIAL    Patient using 0.52ml with breakfast, 0.52ml with lunch and 0.45ml with dinner. INSULIN SYRINGE-NEEDLE U-100 31G X 5/16\" 1 ML MISC    Use to subcutaneously inject insulin three times daily    KETOCONAZOLE (NIZORAL) 2 % CREAM    Apply twice a day for yeast infection in the skin folds, for 4 weeks    LANCET DEVICES (LANCING DEVICE) MISC    Provide patient with lancing device appropriate for his machine/lancing needles. LANCETS MISC    Use to check blood sugar three times daily along with when necessary due to symptoms. LATANOPROST (XALATAN) 0.005 % OPHTHALMIC SOLUTION    1 drop nightly    LIDOCAINE 4 % LOTN    Apply topically    LISINOPRIL (PRINIVIL;ZESTRIL) 5 MG TABLET    Take 1 tablet by mouth daily . Discontinued Lisinopril  10 mg    MAGNESIUM OXIDE (MAG-OX) 400 (240 MG) MG TABLET    Take 1 tablet by mouth daily    MELATONIN 10 MG TABS    Take 10 mg by mouth nightly as needed (insomnia)    METOPROLOL TARTRATE (LOPRESSOR) 50 MG TABLET    Take 1 tablet by mouth 2 times daily    MUPIROCIN (BACTROBAN) 2 % OINTMENT    Apply topically 2 times daily on the affected area for 7-10 days. OK to substitute to cream    NITROGLYCERIN (NITROSTAT) 0.4 MG SL TABLET    DISSOLVE 1 TAB UNDER TONGUE FOR CHEST PAIN - IF PAIN REMAINS AFTER 5 MIN, CALL 911 AND REPEAT DOSE. MAX 3 TABS IN 15 MINUTES    NYSTATIN (MYCOSTATIN) 487362 UNIT/GM POWDER    Apply 2 times daily in the skin folds for several months    ONE TOUCH ULTRASOFT LANCETS MISC    Patient to test blood sugar up to 4 times daily. OXYCODONE HCL (OXY-IR) 10 MG IMMEDIATE RELEASE TABLET    Take 1 tablet by mouth every 8 hours as needed for Pain for up to 30 days. OXYGEN TUBING MISC    by Does not apply route DX COPD.  chronic respiratory failure    PANTOPRAZOLE (PROTONIX) 40 MG TABLET    Take 1 tablet by mouth every morning (before breakfast)    PROAIR  (90 BASE) MCG/ACT INHALER    INHALE TWO PUFFS BY MOUTH EVERY 6 HOURS AS NEEDED FOR WHEEZING OR FOR SHORTNESS OF BREATH    RESPIRATORY THERAPY SUPPLIES (NEBULIZER/TUBING/MOUTHPIECE) KIT    Dx COPD needs nebulizer supplies    ROSUVASTATIN (CRESTOR) 10 MG TABLET    Take 1 tablet by mouth nightly Stop pravastatin    SPIRONOLACTONE (ALDACTONE) 50 MG TABLET    Take 1 tablet by mouth daily    SULFACETAMIDE (BLEPH-10) 10 % OPHTHALMIC SOLUTION        TIOTROPIUM (SPIRIVA RESPIMAT) 2.5 MCG/ACT AERS INHALER    Inhale 2 puffs into the lungs daily    TIZANIDINE (ZANAFLEX) 4 MG TABLET    TAKE ONE TABLET BY MOUTH EVERY 8 HOURS AS NEEDED FOR BACK PAIN    VENLAFAXINE (EFFEXOR XR) 75 MG EXTENDED RELEASE CAPSULE    Take 1 capsule by mouth daily Take with food    VITAMIN B-12 (CYANOCOBALAMIN) 500 MCG TABLET    Take 1 tablet by mouth daily    VITAMIN D3 (CHOLECALCIFEROL) 25 MCG (1000 UT) TABS TABLET    Take 1 tablet by mouth daily     ALLERGIES     is allergic to levofloxacin, lorazepam, nsaids, prozac [fluoxetine hcl], and vancomycin. FAMILY HISTORY     He indicated that the status of his mother is unknown. He indicated that the status of his father is unknown. He indicated that the status of his sister is unknown.      SOCIAL HISTORY       Social History     Tobacco Use    Smoking status: Former Smoker     Packs/day: 0.25     Years: 33.00     Pack years: 8.25     Types: Cigarettes     Start date: 1985     Quit date: 2020     Years since quittin.9    Smokeless tobacco: Former User     Types: Snuff     Quit date: 1995   Vaping Use    Vaping Use: Never used   Substance Use Topics    Alcohol use: No     Alcohol/week: 0.0 standard drinks    Drug use: Not Currently     Types: Marijuana     Comment: Patient reports he quit using THC for 26 days on 2021     PHYSICAL EXAM     INITIAL VITALS: BP (!) 109/51   Pulse 92   Temp 99.9 °F (37.7 °C) (Oral)   Resp 19   Ht 6' (1.829 m)   Wt (!) 412 lb (186.9 kg)   SpO2 97%   BMI 55.88 kg/m²    Physical Exam  Vitals and nursing note reviewed. Constitutional:       Appearance: Normal appearance. HENT:      Head: Normocephalic and atraumatic. Nose: Nose normal.   Eyes:      Extraocular Movements: Extraocular movements intact. Pupils: Pupils are equal, round, and reactive to light. Cardiovascular:      Rate and Rhythm: Normal rate and regular rhythm. Pulmonary:      Effort: Pulmonary effort is normal. No respiratory distress. Breath sounds: Normal breath sounds. Comments: Tachypnea speaking full sentences coarse breath sounds in all lung fields  Abdominal:      General: Abdomen is flat. There is no distension. Palpations: Abdomen is soft. There is no mass. Musculoskeletal:         General: No swelling. Normal range of motion. Cervical back: Normal range of motion. No rigidity. Skin:     General: Skin is warm and dry. Neurological:      General: No focal deficit present. Mental Status: He is alert. Mental status is at baseline. Cranial Nerves: No cranial nerve deficit. MEDICAL DECISION MAKIN-year-old male presents for evaluation of progressive shortness of breath over the past several days. Wears oxygen at home but was requiring that 6 L nasal cannula on initial presentation here. Will work-up for pneumonia, renal failure, volume overload, heart failure exacerbation, ACS, pulmonary embolism, Covid, pleural effusion, pneumothorax. X-ray consistent with volume overload. Labs show acute kidney injury and significant troponin elevation. With chest pain will heparinize. EKG shows baseline left bundle branch block. D-dimer positive will not be able to do a CT PE tonight due to patient's renal insufficiency. With volume overload and hypoxia will do a dose of Lasix.   On reassessment patient was desatting on 6 L nasal cannula into the 80s will placed on BiPAP and admit to intermediate ICU. CRITICAL CARE:   30 minutes for management of acute hypoxic respiratory failure due to pulmonary edema heart failure, renal failure requiring BiPAP    PROCEDURES:    Procedures    DIAGNOSTIC RESULTS   EKG:All EKG's are interpreted by the Emergency Department Physician who either signs or Co-signs this chart in the absence of a cardiologist.    Left bundle branch block rate of 93 no other concerning ST or T wave changes no change from prior    RADIOLOGY:All plain film, CT, MRI, and formal ultrasound images (except ED bedside ultrasound) are read by the radiologist, see reports below, unless otherwisenoted in MDM or here. XR CHEST PORTABLE   Final Result   1. Similar appearing prominence of the cardiac silhouette. 2. Mild interstitial indistinctness may related to vascular congestion. 3. Bibasilar linear opacifications are favored to represent atelectasis. No   convincing focal consolidation. LABS: All lab results were reviewed by myself, and all abnormals are listed below.   Labs Reviewed   CBC WITH AUTO DIFFERENTIAL - Abnormal; Notable for the following components:       Result Value    RBC 2.97 (*)     Hemoglobin 7.8 (*)     Hematocrit 24.7 (*)     RDW 17.7 (*)     Seg Neutrophils 81 (*)     Lymphocytes 9 (*)     Monocytes 8 (*)     Absolute Lymph # 0.60 (*)     All other components within normal limits   COMPREHENSIVE METABOLIC PANEL W/ REFLEX TO MG FOR LOW K - Abnormal; Notable for the following components:    Glucose 205 (*)     BUN 66 (*)     CREATININE 2.41 (*)     GFR Non- 28 (*)     GFR  34 (*)     All other components within normal limits   TROPONIN - Abnormal; Notable for the following components:    Troponin, High Sensitivity 127 (*)     All other components within normal limits   D-DIMER, QUANTITATIVE - Abnormal; Notable for the following components:    D-Dimer, Quant 1.69 (*)     All other components within normal limits   COVID-19, RAPID   APTT   TROPONIN   BLOOD GAS, VENOUS   HEPARIN LEVEL/ANTI-XA   HEPARIN LEVEL/ANTI-XA       EMERGENCY DEPARTMENTCOURSE:         Vitals:    Vitals:    09/30/21 1804 09/30/21 2105   BP: (!) 109/51    Pulse: 92    Resp: 24 19   Temp: 99.9 °F (37.7 °C)    TempSrc: Oral    SpO2: 97%    Weight: (!) 412 lb (186.9 kg)    Height: 6' (1.829 m)        The patient was given the following medications while in the emergency department:  Orders Placed This Encounter   Medications    heparin (porcine) injection 4,000 Units    heparin (porcine) injection 4,000 Units    heparin (porcine) injection 2,000 Units    heparin 25,000 units in dextrose 5% 250 mL (premix) infusion    furosemide (LASIX) injection 40 mg     CONSULTS:  IP CONSULT TO PULMONOLOGY    FINAL IMPRESSION      1. NSTEMI (non-ST elevated myocardial infarction) ([facility] [address]    2. Acute kidney injury [facility])          DISPOSITION/PLAN   DISPOSITION Admitted 09/30/2021 09:08:32 PM      PATIENT REFERRED TO:  No follow-up provider specified.   DISCHARGE MEDICATIONS:  New Prescriptions    No medications on file     Mimi Tong MD  Attending Emergency Physician                    Mimi Tong MD  09/30/21 1039

## 2021-09-30 NOTE — CARE COORDINATION
[facility] Follow Up Call    2021    Patient: Sylvester Carroll. Patient : 1965   MRN: [id number]    Reason for Admission: [place] - no new obstructive CAD per cath  Discharge Date: 21   RARS: Readmission Risk Score: 39         Spoke with: patient & PCP office staff. Spoke with patient who tells me that he started having symptoms yesterday afternoon - headache, chest & ribs sore from coughing, difficulty with deep inspiration & \"not right\" yesterday. \"Can't take a full breath. \"  \"Tight cough\" -has used inhalers & nebulizer treatment. Feels weak & says he can't stand - legs hurt/ache. Oxygen on at 4 liters - he does not have a pulse ox. BS is 119. He does not have a ride to an urgent care & says he is difficult to handle since he is over 400 lbs. He is not receptive to home care or finding a visiting physician - does not want anyone in his home. Informed if he has continued difficulty with breathing, there is no choice but to call 911. He says he will not do that because he will be taken to [facility] & he \"never wants to go there again. \"    He was offered an evisit or VV by PCP office today - he didn't want to do - thinks he should just get an order - explained in order to treat properly, has to at least be a visual assessment. I spoke with office staff & they plan to call him back to try again to set up VV. Needs to be reviewed by the provider   Additional needs identified to be addressed with provider: Yes  Cough, difficulty taking deep breath, headache             Method of communication with provider : phone      Care Transition Nurse (CTN) contacted the patient by telephone to follow up after admission on . Verified name and  with patient as identifiers. Addressed changes since last contact: increased symptoms since yesterday  Discussed follow-up appointments. If no appointment was previously scheduled, appointment scheduling offered: Yes.    Is follow up appointment scheduled within 7 days of discharge? Yes. Advance Care Planning:   Does patient have an Advance Directive: reviewed and current. CTN reviewed discharge instructions, medical action plan and red flags with patient and discussed any barriers to care and/or understanding of plan of care after discharge. Discussed appropriate site of care based on symptoms and resources available to patient including: PCP, Specialist and CTN. The patient agrees to contact the PCP office for questions related to their healthcare. Patients top risk factors for readmission: medical condition-COPD, comorbidities, medication management and multiple health system providers  Interventions to address risk factors: Scheduled appointment with PCP-completed 9/21 & other is scheduled, Scheduled appointment with Specialist-cardio, pul and Obtained and reviewed discharge summary and/or continuity of care documents      Non-[facility] follow up appointment(s): cardio 9/28  CTN provided contact information for future needs. Plan for follow-up call in 1-2 days based on severity of symptoms and risk factors.   Plan for next call: symptom management-reassess and follow up appointment-review VV          Follow Up  Future Appointments   Date Time Provider William Hernandez   10/19/2021  7:10 AM [facility]ICATION [facility]   10/21/2021  9:00 AM Madison Hatchet, MD [facility]   11/9/2021  8:40 AM Dee Gomez MD Neuro Spec [facility]   3/17/2022  8:00 AM Anita Benitez MD [email or web address]   3/22/2022 10:00 AM Jose Allred MD [address] [place] [address]       Portillo Sanchez RN

## 2021-09-30 NOTE — TELEPHONE ENCOUNTER
Reached out to pt to try and get him scheduled for vv as care coordinator gave us a call asn asked if we could try and reach ou.  LM with pt

## 2021-09-30 NOTE — ED TRIAGE NOTES
Patient to ed from home with increasing need for oxygen. Patient normally uses 3l/nc and was increased to 4.8l yesterday with no results. Paitient placed on 6l/nc by this rn in triage. Patient sent in by PCP.

## 2021-09-30 NOTE — TELEPHONE ENCOUNTER
Writer contacted Dr. Dion Nielsen to inform of 30 day readmission risk. Dr. Dion Nielsen informed writer of potential readmission.

## 2021-09-30 NOTE — PROGRESS NOTES
9/30/2021    I tried to do a virtual visit with him and I sent him doxy. me and he did not answer  I contacted the emergency contact, I spoke with Mane Tillman, she is was not home, at [address] PM, she said he might be sleeping    I called again the patient and finally he answered on the phone saying, interrupted sentences, audibly short of breath, \"my Ribs hurt from coughing, whole body hurts, breathing treatments with albuterol not working, and he increased his oxygen at 4.5 L/min O2\"  He declined me to call 911, he agreed for me to call back his daughter who was at work  I spoke again with Mane Tillman said she will go and take him to the emergency room. I did call [facility] and they gave signout to Erik Denver, he is fully vaccinated, he is in respiratory distress, I suspect COVID-19 infection or other viral infection going on          Per care coordinator's note from 9/30/2021   \"Spoke with: patient & PCP office staff. Spoke with patient who tells me that he started having symptoms yesterday afternoon - headache, chest & ribs sore from coughing, difficulty with deep inspiration & \"not right\" yesterday. \"Can't take a full breath. \"  \"Tight cough\" -has used inhalers & nebulizer treatment. Feels weak & says he can't stand - legs hurt/ache.     Oxygen on at 4 liters - he does not have a pulse ox. BS is 119.     He does not have a ride to an urgent care & says he is difficult to handle since he is over 400 lbs. He is not receptive to home care or finding a visiting physician - does not want anyone in his home. Informed if he has continued difficulty with breathing, there is no choice but to call 911. He says he will not do that because he will be taken to [facility] & he \"never wants to go there again. \"     He was offered an evisit or VV by PCP office today - he didn't want to do - thinks he should just get an order - explained in order to treat properly, has to at least be a visual assessment.   I spoke with office staff & they plan to call him back to try again to set up VV. \"        Carmen Symone.  to Me          9/30/21 7:49 AM  Can you please give me something for my coughing and my body aches I really feel bad can't get out of bed my head is pounding      1. Acute on chronic respiratory failure with hypoxia (HCC)    2.  Acute viral syndrome            Electronically signed by Melissa Wilson MD on 9/30/21 at 5:13 PM EDT

## 2021-10-01 ENCOUNTER — APPOINTMENT (OUTPATIENT)
Dept: GENERAL RADIOLOGY | Age: 56
DRG: 280 | End: 2021-10-01
Payer: COMMERCIAL

## 2021-10-01 LAB
AMYLASE: 22 U/L (ref 28–100)
AMYLASE: 27 U/L (ref 28–100)
ANION GAP SERPL CALCULATED.3IONS-SCNC: 16 MMOL/L (ref 9–17)
ANTI-XA UNFRAC HEPARIN: <0.1 IU/L (ref 0.3–0.7)
ANTI-XA UNFRAC HEPARIN: <0.1 IU/L (ref 0.3–0.7)
BILIRUBIN URINE: NEGATIVE
BNP INTERPRETATION: ABNORMAL
BUN BLDV-MCNC: 65 MG/DL (ref 6–20)
BUN/CREAT BLD: ABNORMAL (ref 9–20)
CALCIUM SERPL-MCNC: 9.6 MG/DL (ref 8.6–10.4)
CHLORIDE BLD-SCNC: 94 MMOL/L (ref 98–107)
CHLORIDE, UR: 45 MMOL/L
CO2: 23 MMOL/L (ref 20–31)
COLOR: ABNORMAL
COMMENT UA: ABNORMAL
CREAT SERPL-MCNC: 2.36 MG/DL (ref 0.7–1.2)
GFR AFRICAN AMERICAN: 35 ML/MIN
GFR NON-AFRICAN AMERICAN: 29 ML/MIN
GFR SERPL CREATININE-BSD FRML MDRD: ABNORMAL ML/MIN/{1.73_M2}
GFR SERPL CREATININE-BSD FRML MDRD: ABNORMAL ML/MIN/{1.73_M2}
GLUCOSE BLD-MCNC: 319 MG/DL (ref 70–99)
GLUCOSE BLD-MCNC: 353 MG/DL (ref 75–110)
GLUCOSE BLD-MCNC: 488 MG/DL (ref 75–110)
GLUCOSE BLD-MCNC: 533 MG/DL (ref 75–110)
GLUCOSE BLD-MCNC: 540 MG/DL (ref 75–110)
GLUCOSE URINE: NEGATIVE
HCT VFR BLD CALC: 25.2 % (ref 41–53)
HEMOGLOBIN: 8.3 G/DL (ref 13.5–17.5)
KETONES, URINE: NEGATIVE
LEUKOCYTE ESTERASE, URINE: NEGATIVE
LIPASE: 23 U/L (ref 13–60)
LIPASE: 24 U/L (ref 13–60)
MCH RBC QN AUTO: 27.4 PG (ref 26–34)
MCHC RBC AUTO-ENTMCNC: 32.7 G/DL (ref 31–37)
MCV RBC AUTO: 83.8 FL (ref 80–100)
NITRITE, URINE: NEGATIVE
NRBC AUTOMATED: ABNORMAL PER 100 WBC
PDW BLD-RTO: 17.7 % (ref 11.5–14.9)
PH UA: 5.5 (ref 5–8)
PLATELET # BLD: 175 K/UL (ref 150–450)
PMV BLD AUTO: 8.9 FL (ref 6–12)
POTASSIUM SERPL-SCNC: 5.8 MMOL/L (ref 3.7–5.3)
POTASSIUM SERPL-SCNC: 5.9 MMOL/L (ref 3.7–5.3)
POTASSIUM, UR: 53.9 MMOL/L
PRO-BNP: 2222 PG/ML
PROTEIN UA: NEGATIVE
RBC # BLD: 3.01 M/UL (ref 4.5–5.9)
SODIUM BLD-SCNC: 133 MMOL/L (ref 135–144)
SODIUM,UR: 49 MMOL/L
SPECIFIC GRAVITY UA: 1.02 (ref 1–1.03)
TROPONIN INTERP: ABNORMAL
TROPONIN T: ABNORMAL NG/ML
TROPONIN, HIGH SENSITIVITY: 83 NG/L (ref 0–22)
TURBIDITY: CLEAR
URINE HGB: NEGATIVE
UROBILINOGEN, URINE: NORMAL
WBC # BLD: 9.2 K/UL (ref 3.5–11)

## 2021-10-01 PROCEDURE — 6360000002 HC RX W HCPCS: Performed by: INTERNAL MEDICINE

## 2021-10-01 PROCEDURE — 6370000000 HC RX 637 (ALT 250 FOR IP): Performed by: NURSE PRACTITIONER

## 2021-10-01 PROCEDURE — 6360000002 HC RX W HCPCS: Performed by: STUDENT IN AN ORGANIZED HEALTH CARE EDUCATION/TRAINING PROGRAM

## 2021-10-01 PROCEDURE — 6370000000 HC RX 637 (ALT 250 FOR IP): Performed by: INTERNAL MEDICINE

## 2021-10-01 PROCEDURE — 80048 BASIC METABOLIC PNL TOTAL CA: CPT

## 2021-10-01 PROCEDURE — 84133 ASSAY OF URINE POTASSIUM: CPT

## 2021-10-01 PROCEDURE — 71045 X-RAY EXAM CHEST 1 VIEW: CPT

## 2021-10-01 PROCEDURE — 83690 ASSAY OF LIPASE: CPT

## 2021-10-01 PROCEDURE — 2500000003 HC RX 250 WO HCPCS: Performed by: NURSE PRACTITIONER

## 2021-10-01 PROCEDURE — 94640 AIRWAY INHALATION TREATMENT: CPT

## 2021-10-01 PROCEDURE — 85027 COMPLETE CBC AUTOMATED: CPT

## 2021-10-01 PROCEDURE — 2700000000 HC OXYGEN THERAPY PER DAY

## 2021-10-01 PROCEDURE — 2060000000 HC ICU INTERMEDIATE R&B

## 2021-10-01 PROCEDURE — 85520 HEPARIN ASSAY: CPT

## 2021-10-01 PROCEDURE — 84484 ASSAY OF TROPONIN QUANT: CPT

## 2021-10-01 PROCEDURE — 83880 ASSAY OF NATRIURETIC PEPTIDE: CPT

## 2021-10-01 PROCEDURE — 94664 DEMO&/EVAL PT USE INHALER: CPT

## 2021-10-01 PROCEDURE — 99291 CRITICAL CARE FIRST HOUR: CPT | Performed by: INTERNAL MEDICINE

## 2021-10-01 PROCEDURE — 6370000000 HC RX 637 (ALT 250 FOR IP): Performed by: STUDENT IN AN ORGANIZED HEALTH CARE EDUCATION/TRAINING PROGRAM

## 2021-10-01 PROCEDURE — 94761 N-INVAS EAR/PLS OXIMETRY MLT: CPT

## 2021-10-01 PROCEDURE — 82150 ASSAY OF AMYLASE: CPT

## 2021-10-01 PROCEDURE — 2580000003 HC RX 258: Performed by: NURSE PRACTITIONER

## 2021-10-01 PROCEDURE — 36415 COLL VENOUS BLD VENIPUNCTURE: CPT

## 2021-10-01 PROCEDURE — 82436 ASSAY OF URINE CHLORIDE: CPT

## 2021-10-01 PROCEDURE — 6360000002 HC RX W HCPCS: Performed by: NURSE PRACTITIONER

## 2021-10-01 PROCEDURE — 94660 CPAP INITIATION&MGMT: CPT

## 2021-10-01 PROCEDURE — 82947 ASSAY GLUCOSE BLOOD QUANT: CPT

## 2021-10-01 PROCEDURE — 84300 ASSAY OF URINE SODIUM: CPT

## 2021-10-01 PROCEDURE — 81003 URINALYSIS AUTO W/O SCOPE: CPT

## 2021-10-01 PROCEDURE — 84132 ASSAY OF SERUM POTASSIUM: CPT

## 2021-10-01 RX ORDER — DEXTROSE MONOHYDRATE 50 MG/ML
100 INJECTION, SOLUTION INTRAVENOUS PRN
Status: DISCONTINUED | OUTPATIENT
Start: 2021-10-01 | End: 2021-10-04 | Stop reason: HOSPADM

## 2021-10-01 RX ORDER — METHYLPREDNISOLONE SODIUM SUCCINATE 125 MG/2ML
60 INJECTION, POWDER, LYOPHILIZED, FOR SOLUTION INTRAMUSCULAR; INTRAVENOUS EVERY 12 HOURS
Status: DISCONTINUED | OUTPATIENT
Start: 2021-10-02 | End: 2021-10-02

## 2021-10-01 RX ORDER — INSULIN GLARGINE 100 [IU]/ML
25 INJECTION, SOLUTION SUBCUTANEOUS NIGHTLY
Status: DISCONTINUED | OUTPATIENT
Start: 2021-10-01 | End: 2021-10-01

## 2021-10-01 RX ORDER — FUROSEMIDE 10 MG/ML
20 INJECTION INTRAMUSCULAR; INTRAVENOUS 2 TIMES DAILY
Status: DISCONTINUED | OUTPATIENT
Start: 2021-10-01 | End: 2021-10-04 | Stop reason: HOSPADM

## 2021-10-01 RX ORDER — METHYLPREDNISOLONE SODIUM SUCCINATE 125 MG/2ML
60 INJECTION, POWDER, LYOPHILIZED, FOR SOLUTION INTRAMUSCULAR; INTRAVENOUS EVERY 6 HOURS
Status: DISCONTINUED | OUTPATIENT
Start: 2021-10-01 | End: 2021-10-01

## 2021-10-01 RX ORDER — CALCIUM GLUCONATE 94 MG/ML
1000 INJECTION, SOLUTION INTRAVENOUS ONCE
Status: CANCELLED | OUTPATIENT
Start: 2021-10-01 | End: 2021-10-01

## 2021-10-01 RX ORDER — DEXTROSE MONOHYDRATE 25 G/50ML
12.5 INJECTION, SOLUTION INTRAVENOUS PRN
Status: DISCONTINUED | OUTPATIENT
Start: 2021-10-01 | End: 2021-10-04 | Stop reason: HOSPADM

## 2021-10-01 RX ORDER — INSULIN GLARGINE 100 [IU]/ML
10 INJECTION, SOLUTION SUBCUTANEOUS NIGHTLY
Status: DISCONTINUED | OUTPATIENT
Start: 2021-10-01 | End: 2021-10-01

## 2021-10-01 RX ORDER — SODIUM POLYSTYRENE SULFONATE 15 G/60ML
30 SUSPENSION ORAL; RECTAL ONCE
Status: COMPLETED | OUTPATIENT
Start: 2021-10-01 | End: 2021-10-01

## 2021-10-01 RX ORDER — FUROSEMIDE 10 MG/ML
20 INJECTION INTRAMUSCULAR; INTRAVENOUS ONCE
Status: COMPLETED | OUTPATIENT
Start: 2021-10-01 | End: 2021-10-01

## 2021-10-01 RX ORDER — NICOTINE 21 MG/24HR
1 PATCH, TRANSDERMAL 24 HOURS TRANSDERMAL DAILY
Status: DISCONTINUED | OUTPATIENT
Start: 2021-10-01 | End: 2021-10-04 | Stop reason: HOSPADM

## 2021-10-01 RX ORDER — NICOTINE POLACRILEX 4 MG
15 LOZENGE BUCCAL PRN
Status: DISCONTINUED | OUTPATIENT
Start: 2021-10-01 | End: 2021-10-04 | Stop reason: HOSPADM

## 2021-10-01 RX ADMIN — ANTI-FUNGAL POWDER MICONAZOLE NITRATE TALC FREE: 1.42 POWDER TOPICAL at 08:16

## 2021-10-01 RX ADMIN — ANTI-FUNGAL POWDER MICONAZOLE NITRATE TALC FREE: 1.42 POWDER TOPICAL at 20:25

## 2021-10-01 RX ADMIN — IPRATROPIUM BROMIDE AND ALBUTEROL SULFATE 1 AMPULE: .5; 3 SOLUTION RESPIRATORY (INHALATION) at 07:55

## 2021-10-01 RX ADMIN — INSULIN HUMAN 150 UNITS: 500 INJECTION, SOLUTION SUBCUTANEOUS at 17:40

## 2021-10-01 RX ADMIN — IPRATROPIUM BROMIDE AND ALBUTEROL SULFATE 1 AMPULE: .5; 3 SOLUTION RESPIRATORY (INHALATION) at 19:56

## 2021-10-01 RX ADMIN — INSULIN LISPRO 9 UNITS: 100 INJECTION, SOLUTION INTRAVENOUS; SUBCUTANEOUS at 23:22

## 2021-10-01 RX ADMIN — METHYLPREDNISOLONE SODIUM SUCCINATE 60 MG: 125 INJECTION, POWDER, FOR SOLUTION INTRAMUSCULAR; INTRAVENOUS at 08:19

## 2021-10-01 RX ADMIN — VENLAFAXINE HYDROCHLORIDE 75 MG: 75 CAPSULE, EXTENDED RELEASE ORAL at 08:15

## 2021-10-01 RX ADMIN — INSULIN LISPRO 12 UNITS: 100 INJECTION, SOLUTION INTRAVENOUS; SUBCUTANEOUS at 12:58

## 2021-10-01 RX ADMIN — IPRATROPIUM BROMIDE AND ALBUTEROL SULFATE 1 AMPULE: .5; 3 SOLUTION RESPIRATORY (INHALATION) at 14:27

## 2021-10-01 RX ADMIN — Medication 1000 UNITS: at 08:14

## 2021-10-01 RX ADMIN — Medication 500 MCG: at 08:15

## 2021-10-01 RX ADMIN — SODIUM POLYSTYRENE SULFONATE 30 G: 15 SUSPENSION ORAL; RECTAL at 11:00

## 2021-10-01 RX ADMIN — IPRATROPIUM BROMIDE AND ALBUTEROL SULFATE 1 AMPULE: .5; 3 SOLUTION RESPIRATORY (INHALATION) at 11:21

## 2021-10-01 RX ADMIN — OXYCODONE HYDROCHLORIDE 10 MG: 10 TABLET ORAL at 20:34

## 2021-10-01 RX ADMIN — GABAPENTIN 300 MG: 300 CAPSULE ORAL at 01:47

## 2021-10-01 RX ADMIN — GUAIFENESIN 1200 MG: 600 TABLET, EXTENDED RELEASE ORAL at 20:24

## 2021-10-01 RX ADMIN — HEPARIN SODIUM 4000 UNITS: 1000 INJECTION INTRAVENOUS; SUBCUTANEOUS at 06:35

## 2021-10-01 RX ADMIN — SODIUM CHLORIDE, PRESERVATIVE FREE 10 ML: 5 INJECTION INTRAVENOUS at 08:16

## 2021-10-01 RX ADMIN — PANTOPRAZOLE SODIUM 40 MG: 40 TABLET, DELAYED RELEASE ORAL at 08:32

## 2021-10-01 RX ADMIN — SODIUM POLYSTYRENE SULFONATE 30 G: 15 SUSPENSION ORAL; RECTAL at 07:56

## 2021-10-01 RX ADMIN — TIZANIDINE 4 MG: 4 TABLET ORAL at 01:47

## 2021-10-01 RX ADMIN — METOPROLOL TARTRATE 50 MG: 50 TABLET, FILM COATED ORAL at 20:24

## 2021-10-01 RX ADMIN — OXYCODONE HYDROCHLORIDE 10 MG: 10 TABLET ORAL at 01:47

## 2021-10-01 RX ADMIN — CLOPIDOGREL BISULFATE 75 MG: 75 TABLET ORAL at 08:15

## 2021-10-01 RX ADMIN — Medication 400 MG: at 08:15

## 2021-10-01 RX ADMIN — SODIUM CHLORIDE, PRESERVATIVE FREE 10 ML: 5 INJECTION INTRAVENOUS at 21:00

## 2021-10-01 RX ADMIN — METOPROLOL TARTRATE 50 MG: 50 TABLET, FILM COATED ORAL at 01:47

## 2021-10-01 RX ADMIN — FUROSEMIDE 20 MG: 10 INJECTION, SOLUTION INTRAMUSCULAR; INTRAVENOUS at 08:31

## 2021-10-01 RX ADMIN — DOCUSATE SODIUM 100 MG: 100 CAPSULE, LIQUID FILLED ORAL at 08:16

## 2021-10-01 RX ADMIN — ESCITALOPRAM OXALATE 10 MG: 10 TABLET ORAL at 08:15

## 2021-10-01 RX ADMIN — GABAPENTIN 300 MG: 300 CAPSULE ORAL at 08:19

## 2021-10-01 RX ADMIN — LATANOPROST 1 DROP: 50 SOLUTION OPHTHALMIC at 20:25

## 2021-10-01 RX ADMIN — Medication 9 MG: at 01:46

## 2021-10-01 RX ADMIN — ASPIRIN 81 MG: 81 TABLET, COATED ORAL at 08:15

## 2021-10-01 RX ADMIN — ALLOPURINOL 100 MG: 100 TABLET ORAL at 08:14

## 2021-10-01 RX ADMIN — GUAIFENESIN 1200 MG: 600 TABLET, EXTENDED RELEASE ORAL at 08:15

## 2021-10-01 RX ADMIN — Medication 9 MG: at 20:35

## 2021-10-01 RX ADMIN — GUAIFENESIN 1200 MG: 600 TABLET, EXTENDED RELEASE ORAL at 01:47

## 2021-10-01 RX ADMIN — DOCUSATE SODIUM 100 MG: 100 CAPSULE, LIQUID FILLED ORAL at 20:24

## 2021-10-01 RX ADMIN — DOCUSATE SODIUM 100 MG: 100 CAPSULE, LIQUID FILLED ORAL at 01:47

## 2021-10-01 RX ADMIN — OXYCODONE HYDROCHLORIDE 10 MG: 10 TABLET ORAL at 08:31

## 2021-10-01 RX ADMIN — BUDESONIDE AND FORMOTEROL FUMARATE DIHYDRATE 2 PUFF: 160; 4.5 AEROSOL RESPIRATORY (INHALATION) at 20:04

## 2021-10-01 RX ADMIN — AMITRIPTYLINE HYDROCHLORIDE 50 MG: 50 TABLET, FILM COATED ORAL at 22:04

## 2021-10-01 RX ADMIN — METOPROLOL TARTRATE 50 MG: 50 TABLET, FILM COATED ORAL at 08:15

## 2021-10-01 RX ADMIN — GABAPENTIN 300 MG: 300 CAPSULE ORAL at 20:24

## 2021-10-01 RX ADMIN — FUROSEMIDE 20 MG: 10 INJECTION, SOLUTION INTRAMUSCULAR; INTRAVENOUS at 23:22

## 2021-10-01 RX ADMIN — BUDESONIDE AND FORMOTEROL FUMARATE DIHYDRATE 2 PUFF: 160; 4.5 AEROSOL RESPIRATORY (INHALATION) at 08:05

## 2021-10-01 RX ADMIN — FERROUS SULFATE TAB 325 MG (65 MG ELEMENTAL FE) 325 MG: 325 (65 FE) TAB at 08:15

## 2021-10-01 RX ADMIN — INSULIN LISPRO 8 UNITS: 100 INJECTION, SOLUTION INTRAVENOUS; SUBCUTANEOUS at 07:16

## 2021-10-01 RX ADMIN — ROSUVASTATIN CALCIUM 10 MG: 10 TABLET, FILM COATED ORAL at 22:04

## 2021-10-01 ASSESSMENT — ENCOUNTER SYMPTOMS
EYE DISCHARGE: 0
SORE THROAT: 0
DIARRHEA: 0
EYE REDNESS: 0
NAUSEA: 0
RHINORRHEA: 0
SHORTNESS OF BREATH: 1
ABDOMINAL PAIN: 1
ABDOMINAL DISTENTION: 0
VOMITING: 0
BACK PAIN: 0
ABDOMINAL PAIN: 0
WHEEZING: 1
WHEEZING: 0
CHEST TIGHTNESS: 1
COUGH: 1
COLOR CHANGE: 1
CONSTIPATION: 0

## 2021-10-01 ASSESSMENT — PAIN DESCRIPTION - LOCATION
LOCATION: RIB CAGE
LOCATION: BACK
LOCATION: RIB CAGE

## 2021-10-01 ASSESSMENT — PAIN DESCRIPTION - ONSET
ONSET: ON-GOING

## 2021-10-01 ASSESSMENT — PAIN DESCRIPTION - PROGRESSION

## 2021-10-01 ASSESSMENT — PAIN DESCRIPTION - FREQUENCY
FREQUENCY: CONTINUOUS

## 2021-10-01 ASSESSMENT — PAIN SCALES - GENERAL
PAINLEVEL_OUTOF10: 8
PAINLEVEL_OUTOF10: 8
PAINLEVEL_OUTOF10: 3
PAINLEVEL_OUTOF10: 3
PAINLEVEL_OUTOF10: 8
PAINLEVEL_OUTOF10: 3
PAINLEVEL_OUTOF10: 8
PAINLEVEL_OUTOF10: 3
PAINLEVEL_OUTOF10: 3

## 2021-10-01 ASSESSMENT — PAIN - FUNCTIONAL ASSESSMENT
PAIN_FUNCTIONAL_ASSESSMENT: PREVENTS OR INTERFERES SOME ACTIVE ACTIVITIES AND ADLS

## 2021-10-01 ASSESSMENT — PAIN DESCRIPTION - ORIENTATION
ORIENTATION: LEFT
ORIENTATION: LEFT
ORIENTATION: ANTERIOR;RIGHT;LEFT

## 2021-10-01 ASSESSMENT — PAIN DESCRIPTION - DESCRIPTORS
DESCRIPTORS: ACHING
DESCRIPTORS: ACHING

## 2021-10-01 ASSESSMENT — PAIN DESCRIPTION - PAIN TYPE
TYPE: CHRONIC PAIN
TYPE: ACUTE PAIN;CHRONIC PAIN

## 2021-10-01 NOTE — PROGRESS NOTES
Writer reported to resident that still no iv access and no ETA on picc line placement. Resident also called herself and the service was unable to get ETA either. Insulin orders and medications reviewed. New orders received.

## 2021-10-01 NOTE — H&P
[address] [facility]     HISTORY AND PHYSICAL EXAMINATION            Date:   9/30/2021  Patientname:  Kevin Cuba. Date of admission:  9/30/2021  6:08 PM  MRN:   [id number]  Account:  [de-identified]  YOB: 1965  PCP:    Madison Hatchet, MD  Room:   06/06  Code Status:    Prior    CHIEF COMPLAINT     Chief Complaint   Patient presents with    Nasal Congestion    Cough    Rib Pain    Shortness of Breath       HISTORY OF PRESENT ILLNESS  (Character, Onset, Location, Duration,  Exacerbating/RelievingFactors, Radiation,   Associated Symptoms, Severity )      The patient is a 63 y.o.  male, with a history of CHF, CAD, COPD, CKD - Stage 3b, HTN, DM, hyperlipidemia, and morbid obesity, who presents with nasal congestion, cough, rib pain, shortness of breath. According to patient, he has been having increased difficulty for the past 5 months but states that symptoms significantly worsened over the past 2 days. Reports that he has been on home O2 at 3 L/m per nasal cannula, but states that he increased it to 4.5 L/min earlier today due to feeling short of breath and unable to get a full breath. Symptoms are associated with \"tightness\" in chest and inability to expectorate any sputum. .  Patient states he has a frequent cough which is causing muscle pain in bilateral ribs. Denies fever, chills, abdominal pain, nausea, vomiting, diarrhea, and urinary symptoms. Symptoms are aggravated by activity and lying down. There are no alleviating factors; home medications and nebulizer treatments are not improving symptoms. Symptoms are reported as constant and severe, progressively worsening.        PAST MEDICAL HISTORY   Patient  has a past medical history of Acute on chronic kidney failure ([address] Acute on chronic respiratory failure (HCC), Adhesive capsulitis of left shoulder, Anxiety, Arthropathy, unspecified, other specified sites, Asthma, B12 deficiency, Bilateral lower leg cellulitis, Blood in stool, CAD (coronary artery disease), Cardiovascular stress test abnormal, Cellulitis of both lower extremities, Cellulitis of leg, left, CHF (congestive heart failure), NYHA class III ([facility]), Chronic back pain, Chronic bronchitis ([facility]), Chronic headaches, Chronic respiratory failure ([address] Chronic ulcer of left leg, with fat layer exposed ([address] Class 2 severe obesity due to excess calories with serious comorbidity and body mass index (BMI) of 35.0 to 35.9 in adult [facility]), COPD exacerbation ([address] Diabetic neuropathy ([address] Displacement of lumbar intervertebral disc without myelopathy, Ear infection, Essential hypertension, Facial cellulitis, Fall, GERD (gastroesophageal reflux disease), Head injury, Hearing loss in right ear, Hepatic steatosis, History of general anesthesia complication, History of rib fracture, Hyperlipidemia, Hypersomnia, Hypertension, Insomnia, Intolerance of continuous positive airway pressure (CPAP) ventilation, Iron deficiency, Localized rash, Magnesium deficiency, Mastoiditis of left side, Mixed conductive and sensorineural hearing loss of both ears, Mixed type COPD (chronic obstructive pulmonary disease) ([facility]), Moderate recurrent major depression ([address] Morbid obesity with BMI of 45.0-49.9, adult ([address] On home oxygen therapy, Open wound of groin, BARBY on CPAP, Osteoarthritis, Otitis externa of left ear, Pancreatitis chronic, Persistent depressive disorder, Renal insufficiency, Severe depression ([address] Spinal stenosis of lumbar region without neurogenic claudication, Syncope, Tinnitus of both ears, Type 2 diabetes mellitus with stage 3 chronic kidney disease, with long-term current use of insulin ([address] Type II or unspecified type diabetes mellitus without mention of complication, not stated as uncontrolled, Vitamin D deficiency, and Wears glasses.     PAST SURGICAL HISTORY    Patient  has a past surgical history that includes tympanomastoidectomy (Bilateral, 09/20/2012); Coronary angioplasty with stent (03/2013); Hand tendon surgery (Left); Knee arthroscopy (Left); Nerve Block (07/31/2015); Cardiac catheterization (04/23/2018); pr esophagogastroduodenoscopy transoral diagnostic (N/A, 07/18/2018); Intracapsular cataract extraction (Right, 11/05/2019); Intracapsular cataract extraction (Left, 01/07/2020); back surgery ( (x 4) 2000,.12/2011.2/2012); Colonoscopy (11/03/2015); Colonoscopy (2013); and Colonoscopy (N/A, 04/12/2021). FAMILY HISTORY    Patient family history includes Diabetes in his mother and sister; Heart Attack in his sister; Heart Disease in his mother and sister; High Blood Pressure in his father and mother; Kidney Disease in his father; Obesity in his sister. SOCIAL HISTORY    Patient  reports that he quit smoking about 10 months ago. His smoking use included cigarettes. He started smoking about 36 years ago. He has a 8.25 pack-year smoking history. He quit smokeless tobacco use about 26 years ago. His smokeless tobacco use included snuff. He reports previous drug use. Drug: Marijuana. He reports that he does not drink alcohol. HOME MEDICATIONS        Prior to Admission medications    Medication Sig Start Date End Date Taking? Authorizing Provider   Insulin Syringe-Needle U-100 31G X 5/16\" 1 ML MISC Use to subcutaneously inject insulin three times daily 9/22/21   Ted Hernandez MD   Continuous Blood Gluc Sensor (FREESTYLE CRISTINE 14 DAY SENSOR) MISC USE AS DIRECTED AND CHANGE EVERY 14 DAYS 9/15/21   Ted Hernandez MD   oxyCODONE HCl (OXY-IR) 10 MG immediate release tablet Take 1 tablet by mouth every 8 hours as needed for Pain for up to 30 days. 9/9/21 10/9/21  Paige Bryant MD   allopurinol (ZYLOPRIM) 100 MG tablet Take 1 tablet by mouth daily FOR GOUT.  STOP IT IF ANY RASH DEVELOPS! 8/30/21   Ted Hernandez MD   vitamin D3 (CHOLECALCIFEROL) 25 MCG (1000 UT) TABS tablet Take 1 tablet by mouth daily 8/25/21   Paige MD Manny   docusate sodium (COLACE) 100 MG capsule Take 1 capsule by mouth 2 times daily For constipation 8/17/21   Vishnu Cole MD   latanoprost (XALATAN) 0.005 % ophthalmic solution 1 drop nightly    Historical Provider, MD   gabapentin (NEURONTIN) 300 MG capsule Take 1 po bid 8/13/21 11/13/21  Abida Lang MD   butalbital-acetaminophen-caffeine [facility] -99 MG per tablet Take 1 tablet by mouth every 8 hours as needed for Headaches 8/13/21   Abida Lang MD   clopidogrel (PLAVIX) 75 MG tablet Take 1 tablet by mouth daily 8/11/21   Vishnu Cole MD   nitroGLYCERIN (NITROSTAT) 0.4 MG SL tablet DISSOLVE 1 TAB UNDER TONGUE FOR CHEST PAIN - IF PAIN REMAINS AFTER 5 MIN, CALL 911 AND REPEAT DOSE. MAX 3 TABS IN 15 MINUTES 8/2/21   Vishnu Cole MD   pantoprazole (PROTONIX) 40 MG tablet Take 1 tablet by mouth every morning (before breakfast) 7/20/21   Vishnu Cole MD   insulin regular human (HUMULIN R) 500 UNIT/ML concentrated injection vial Patient using 0.52ml with breakfast, 0.52ml with lunch and 0.45ml with dinner. 6/28/21   Vishnu Cole MD   escitalopram (LEXAPRO) 10 MG tablet Take 1 tablet by mouth daily 6/14/21   Vishnu Cole MD   rosuvastatin (CRESTOR) 10 MG tablet Take 1 tablet by mouth nightly Stop pravastatin 5/21/21   Vishnu Cole MD   Continuous Blood Gluc Sensor (FREESTYLE CRISTINE 2 SENSOR) [facility] Patient to apply a new sensor every 14 days. RX to cover voucher patient will bring in. 5/19/21   Vishnu Cole MD   ammonium lactate (LAC-HYDRIN) 12 % cream Apply topically to dry skin BID. 5/14/21   Anna Evans DPM   sulfacetamide (BLEPH-10) 10 % ophthalmic solution  3/24/21   Historical Provider, MD   mupirocin (BACTROBAN) 2 % ointment Apply topically 2 times daily on the affected area for 7-10 days.  OK to substitute to cream 4/23/21   Vishnu Cole MD   Gauze Pads & Dressings 4\"X4\" PADS Twice a day dressing of right leg wound 4/23/21   Rodrigo Ch MD   Gauze Bandages (ROLLED GAUZE BANDAGE 4\"X2. 5YD) MISC For twice a day dressing 4/23/21   Rodrigo Ch MD   PROAIR  (90 Base) MCG/ACT inhaler INHALE TWO PUFFS BY MOUTH EVERY 6 HOURS AS NEEDED FOR WHEEZING OR FOR SHORTNESS OF BREATH 4/13/21   Rodrigo Ch MD   amitriptyline (ELAVIL) 50 MG tablet Take 2 po qhs 4/9/21   Junior Rooney MD   nystatin (MYCOSTATIN) 569027 UNIT/GM powder Apply 2 times daily in the skin folds for several months 3/31/21   Rodrigo Ch MD   tiZANidine (ZANAFLEX) 4 MG tablet TAKE ONE TABLET BY MOUTH EVERY 8 HOURS AS NEEDED FOR BACK PAIN 3/31/21   Rodrigo Ch MD   bumetanide (BUMEX) 1 MG tablet TAKE THREE TABLETS BY MOUTH TWICE A DAY 3/8/21   Rodrigo Ch MD   spironolactone (ALDACTONE) 50 MG tablet Take 1 tablet by mouth daily 2/24/21   Rodrigo Ch MD   clobetasol (TEMOVATE) 0.05 % ointment Apply topically 2 times daily for psoriasis 1/27/21   Rodrigo Ch MD   ketoconazole (NIZORAL) 2 % cream Apply twice a day for yeast infection in the skin folds, for 4 weeks 1/20/21   Rodrigo Ch MD   albuterol (PROVENTIL) (2.5 MG/3ML) 0.083% nebulizer solution Take 3 mLs by nebulization every 6 hours as needed for Wheezing or Shortness of Breath 1/15/21   Rodrigo Ch MD   Ferrous Sulfate (IRON) 325 (65 Fe) MG TABS Take 1 tablet by mouth daily 11/25/20   Rodrigo Ch MD   metoprolol tartrate (LOPRESSOR) 50 MG tablet Take 1 tablet by mouth 2 times daily 11/4/20   Rodrigo Ch MD   magnesium oxide (MAG-OX) 400 (240 Mg) MG tablet Take 1 tablet by mouth daily 11/4/20   Rodrigo Ch MD   lisinopril (PRINIVIL;ZESTRIL) 5 MG tablet Take 1 tablet by mouth daily . Discontinued Lisinopril  10 mg 11/2/20   Rodrigo Ch MD   fluticasone-salmeterol (ADVAIR HFA) 230-21 MCG/ACT inhaler Inhale 2 puffs into the lungs 2 times daily 10/19/20   Rodrigo Ch MD tiotropium (SPIRIVA RESPIMAT) 2.5 MCG/ACT AERS inhaler Inhale 2 puffs into the lungs daily 10/19/20   Debra Calvillo MD   venlafaxine (EFFEXOR XR) 75 MG extended release capsule Take 1 capsule by mouth daily Take with food 10/14/20   Debra Calvillo MD   blood glucose monitor strips Test 3 times a day & as needed for symptoms of irregular blood glucose. Dispense sufficient amount for indicated testing frequency plus additional to accommodate PRN testing needs. One touch Ultra blue 9/30/20   Aura Hamilton MD   Lancets MISC Use to check blood sugar three times daily along with when necessary due to symptoms. 9/30/20   Aura Hamilton MD   vitamin B-12 (CYANOCOBALAMIN) 500 MCG tablet Take 1 tablet by mouth daily 7/13/20   Debra Calvillo MD   Oxygen Tubing MISC by Does not apply route DX COPD. chronic respiratory failure 3/26/20   Debra Calvillo MD   Respiratory Therapy Supplies (NEBULIZER/TUBING/MOUTHPIECE) KIT Dx COPD needs nebulizer supplies 2/20/20   Debra Calvillo MD   ONE TOUCH ULTRASOFT LANCETS [address] [facility] Patient to test blood sugar up to 4 times daily. 11/7/19   Aura Hamilton MD   Lancet Devices (LANCING DEVICE) MISC Provide patient with lancing device appropriate for his machine/lancing needles. 6/4/19   Debra Calvillo MD   Lidocaine 4 % LOTN Apply topically    Historical Provider, MD   Handicap Placard MISC by Does not apply route Can't walk greater than 200 feet. Expires in 5 years. 2/13/19   Paige Bryant MD   fluticasone (CUTIVATE) 0.05 % cream Apply topically 2 times daily  4/19/17   Historical Provider, MD   fluticasone (FLONASE) 50 MCG/ACT nasal spray 2 sprays by Nasal route daily  Patient taking differently: 2 sprays by Nasal route daily as needed (sinus symptoms)  1/16/17   Debra Calvillo MD   Melatonin 10 MG TABS Take 10 mg by mouth nightly as needed (insomnia) 12/23/16   Debra Calvillo MD   aspirin 81 MG EC tablet Take 81 mg by mouth daily. Historical Provider, MD       ALLERGIES      Levofloxacin, Lorazepam, Nsaids, Prozac [fluoxetine hcl], and Vancomycin    REVIEW OF SYSTEMS     Review of Systems   Constitutional: Positive for activity change. Negative for chills, diaphoresis and fever. HENT: Positive for congestion. Negative for sore throat. Respiratory: Positive for cough, chest tightness, shortness of breath and wheezing. Inability to expectorate sputum   Cardiovascular: Positive for chest pain (\"Rib pain\" from coughing) and leg swelling. Negative for palpitations. Gastrointestinal: Negative for abdominal pain, constipation, diarrhea, nausea and vomiting. Genitourinary: Negative for dysuria, frequency and urgency. Musculoskeletal: Negative for back pain and myalgias. Skin: Negative for rash. Neurological: Negative for dizziness, weakness and headaches. Psychiatric/Behavioral: The patient is not nervous/anxious. PHYSICAL EXAM      BP (!) 109/51   Pulse 92   Temp 99.9 °F (37.7 °C) (Oral)   Resp 19   Ht 6' (1.829 m)   Wt (!) 412 lb (186.9 kg)   SpO2 97%   BMI 55.88 kg/m²  Body mass index is 55.88 kg/m². Physical Exam  Constitutional:       General: He is not in acute distress. Appearance: He is well-developed. He is obese. He is not diaphoretic. HENT:      Head: Normocephalic and atraumatic. Eyes:      Conjunctiva/sclera: Conjunctivae normal.      Pupils: Pupils are equal, round, and reactive to light. Neck:      Trachea: No tracheal deviation. Cardiovascular:      Rate and Rhythm: Regular rhythm. Tachycardia present. Heart sounds: Normal heart sounds. No murmur heard. No friction rub. No gallop. Pulmonary:      Effort: No respiratory distress. Breath sounds: No wheezing or rales. Comments: Breath sounds significantly diminished throughout. Poor air intake with slight expiratory wheeze audible. Increased work of breathing noted despite being on BiPAP.   Chest:      Chest wall: No tenderness. Abdominal:      General: Bowel sounds are normal. There is no distension. Palpations: Abdomen is soft. Tenderness: There is no abdominal tenderness. There is no guarding. Musculoskeletal:         General: No tenderness. Normal range of motion. Cervical back: Normal range of motion and neck supple. Lymphadenopathy:      Cervical: No cervical adenopathy. Skin:     General: Skin is warm and dry. Coloration: Skin is not pale. Findings: No erythema or rash. Neurological:      Mental Status: He is alert and oriented to person, place, and time. Motor: No seizure activity. Coordination: Coordination normal.   Psychiatric:         Behavior: Behavior normal.         Thought Content: Thought content normal.       DIAGNOSTICS      EKG:   EKG 12 Lead [[id number]]    Collected: 09/30/21 1841    Updated: 09/30/21 1844     Ventricular Rate 93 BPM    Atrial Rate 93 BPM    P-R Interval 146 ms    QRS Duration 170 ms    Q-T Interval 398 ms    QTc Calculation (Bazett) 494 ms    P Axis 37 degrees    R Axis 8 degrees    T Axis 130 degrees   Narrative:     Normal sinus rhythm   Left bundle branch block   Abnormal ECG   When compared with ECG of 02-OCT-2018 14:41,   Left bundle branch block is now Present   Criteria for Septal infarct are no longer Present     Labs:  CBC:   Recent Labs     09/30/21 1947   WBC 7.0   HGB 7.8*        BMP:    Recent Labs     09/30/21 1947      K 5.3   CL 99   CO2 23   BUN 66*   CREATININE 2.41*   GLUCOSE 205*     S. Calcium:  Recent Labs     09/30/21 1947   CALCIUM 9.3     S. Ionized Calcium:No results for input(s): IONCA in the last 72 hours. S. Magnesium:No results for input(s): MG in the last 72 hours. S. Phosphorus:No results for input(s): PHOS in the last 72 hours. S. Glucose:No results for input(s): POCGLU in the last 72 hours.   Glycosylated hemoglobin A1C:   Lab Results   Component Value Date    LABA1C 7.6 07/20/2021 Hepatic:   Recent Labs     09/30/21 1947   AST 14   ALT 23   ALKPHOS 85     CARDIAC ENZY:   Recent Labs     09/30/21 1947   TROPHS 127*     INR: No results for input(s): INR in the last 72 hours. BNP: No results for input(s): PROBNP in the last 72 hours. ABGs: No results for input(s): PH, PCO2, PO2, HCO3, O2SAT in the last 72 hours. Lipids: No results for input(s): CHOL, TRIG, HDL, LDLCALC in the last 72 hours. Invalid input(s): LDL  Pancreatic functions:No results for input(s): LIPASE, AMYLASE in the last 72 hours. Geroldine Hiss: No results for input(s): LACTA in the last 72 hours. Thyroid functions:   Lab Results   Component Value Date    TSH 2.26 01/20/2021      U/A:No results for input(s): NITRITE, COLORU, WBCUA, RBCUA, MUCUS, BACTERIA, CLARITYU, SPECGRAV, LEUKOCYTESUR, BLOODU, GLUCOSEU, AMORPHOUS in the last 72 hours. Invalid input(s): Bruce Keenan    Imaging/Diagonstics:     CT CHEST WO CONTRAST    Result Date: 9/11/2021  EXAMINATION: CT OF THE CHEST WITHOUT CONTRAST 9/11/2021 1:56 am TECHNIQUE: CT of the chest was performed without the administration of intravenous contrast. Multiplanar reformatted images are provided for review. Dose modulation, iterative reconstruction, and/or weight based adjustment of the mA/kV was utilized to reduce the radiation dose to as low as reasonably achievable. COMPARISON: CT PA dated 03/27/2018. HISTORY: ORDERING SYSTEM PROVIDED HISTORY: high risk chest pain, aortic dissection? TECHNOLOGIST PROVIDED HISTORY: high risk chest pain, aortic dissection? Reason for Exam: chest pain FINDINGS: Mediastinum:  No evidence of mediastinal, hilar or axillary lymphadenopathy. Aorta is normal in caliber without acute abnormality although evaluation is limited without IV contrast.  Aortic caliber is unchanged compared to the chest CTA of 03/27/2018. The central airways are clear. Lungs/pleura: Aside from minor bibasilar atelectasis, the lungs are clear.  No pneumothorax or pleural effusion. Upper Abdomen:  Limited images of the upper abdomen demonstrate no acute abnormality. Soft Tissues/Bones: Surrounding osseous and soft tissue structures are unremarkable. The IV was lost and contrast could not be given. Dissection therefore cannot be excluded. This noncontrast exam is normal for age. Findings discussed with the ordering physician 09/11/2021 at 0321 hours. US RENAL COMPLETE    Result Date: 9/12/2021  EXAMINATION: RETROPERITONEAL ULTRASOUND OF THE KIDNEYS AND URINARY BLADDER 9/12/2021 COMPARISON: CT abdomen pelvis from 12/03/2015 HISTORY: ORDERING SYSTEM PROVIDED HISTORY: lakisha TECHNOLOGIST PROVIDED HISTORY: lakisha 58-year-old male with acute kidney injury FINDINGS: Kidneys: Exam is limited due to patient's body habitus. Right kidney measures 12.5 x 6.7 x 6.8 cm. Right renal cortical thickness measures 1.3 cm. The left kidney was difficult to properly visualize. No right-sided hydronephrosis or perinephric fluid. Gross preservation of the right-sided corticomedullary differentiation. Bladder: Urinary bladder is empty. 1. Limited visualization of the right kidney grossly unremarkable. No right-sided hydronephrosis. 2. Left kidney was difficult to properly visualize due to patient's body habitus. 3. Empty urinary bladder. XR CHEST PORTABLE    Result Date: 9/30/2021  EXAMINATION: ONE XRAY VIEW OF THE CHEST 9/30/2021 6:53 pm COMPARISON: 09/11/2021 HISTORY: ORDERING SYSTEM PROVIDED HISTORY: shortness of breath TECHNOLOGIST PROVIDED HISTORY: shortness of breath Reason for Exam: Shortness of breath, hx CHF and copd Acuity: Acute Type of Exam: Initial Additional signs and symptoms: Shortness of breath, hx CHF and copd Relevant Medical/Surgical History: Shortness of breath, hx CHF and copd FINDINGS: Similar appearing prominence of the cardiac silhouette. Mild interstitial indistinctness. Mild bibasilar linear appearing opacifications are favored to represent atelectasis.   No convincing focal consolidation. Mild blunting the right costophrenic angle. The left costophrenic is not included in the field of view. No evidence of pneumothorax. No acute osseous abnormalities. 1. Similar appearing prominence of the cardiac silhouette. 2. Mild interstitial indistinctness may related to vascular congestion. 3. Bibasilar linear opacifications are favored to represent atelectasis. No convincing focal consolidation. XR CHEST PORTABLE    Result Date: 9/11/2021  EXAMINATION: ONE XRAY VIEW OF THE CHEST 9/11/2021 12:57 am COMPARISON: 03/28/2019 HISTORY: ORDERING SYSTEM PROVIDED HISTORY: chest pain TECHNOLOGIST PROVIDED HISTORY: chest pain Reason for Exam: upright FINDINGS: Portable study is limited by obesity. Mild right basilar opacity. Lungs are otherwise grossly clear. No pneumothorax or pleural fluid. Heart size is within normal limits. No acute bone finding. Right basilar opacity may represent airspace disease or superimposed shadows on this portable study obtained on an obese patient. When the patient is able, a follow-up PA and lateral examination of the chest would be helpful. US SCROTUM W LIMITED DUPLEX    Result Date: 9/3/2021  EXAMINATION: ULTRASOUND OF THE SCROTUM/TESTICLES WITH COLOR DOPPLER FLOW EVALUATION 9/3/2021 COMPARISON: None. HISTORY: ORDERING SYSTEM PROVIDED HISTORY: Edema of scrotum TECHNOLOGIST PROVIDED HISTORY: This procedure can be scheduled via [employer]. Access your [employer] account by visiting [email or web address]. FINDINGS: Measurements: Right testicle: 5.0 x 2.1 x 3.0 cm Left testicle: 4.8 x 2.4 x 3.0 cm Right: Grey scale: The right testicle demonstrates unremarkable homogeneous echotexture without focal lesion. No evidence of testicular microlithiasis. Doppler Evaluation:  There is normal arterial and venous Doppler flow within the testicle. Scrotal Sac: Scrotal swelling. Large hydrocele with low-level internal echoes. Epididymis:  Nonvisualized. Left: Grey scale: The left testicle demonstrates unremarkable homogeneous echotexture without focal lesion. No evidence of testicular microlithiasis. Doppler Evaluation:  There is normal arterial and venous Doppler flow within the testicle. Scrotal Sac:  Scrotal swelling. Large hydrocele with low-level internal echoes and scrotal pearls. Epididymis:  Nonvisualized. Scrotal swelling/edema bilaterally with large complex hydroceles, and scrotal pearls on the left. Nonvisualization epididymides. No testicular abnormality. ASSESSMENT  and  PLAN     Active Problems:    Acute on chronic respiratory failure with hypoxia (HCC)  Resolved Problems:    * No resolved hospital problems. *    Plan:    Acute on Chronic respiratory failure with hypoxia  -Patient with history of COPD and CHF  -CXR-mild interstitial indistinctness, may be R/T vascular congestion  -D-dimer 1.69  -Patient unable to have CT chest d/t elevated creatinine and body habitus  -Heparin drip initiated in ED  -Covid swab negative  -Patient takes Bumex 3 mg twice daily  --Spironolactone recently decreased d/t worsening kidney function  -Lasix 40 mg IV in ED  --Additional diuresis to be managed by nephrology  -Solu-Medrol 125 mg, followed by 60 mg every 6 hours  -Covid swab negative    LIZETH superimposed on CKD  -Creatinine 2.41; Baseline near 1.6  -Has known history of CKD stage III  -Nephrology consulted  -monitor BMP    Elevated troponin  -Troponin 127; recheck 130  --Was in mid 45s last month  --possibly d/t elevated creatinine   -Pt's chest pain is described as rib pain and tightness from coughing and difficulty breathing  -Patient had heart cath 9/13/21  --Mild non-obstructive CAD  ---No change from 2018  -Patient on heparin gtt    Diabetes Mellitus  -Patient uses U-500 insulin at home  --Medication not available here  ---Patient prefers to use home supply;  Wife to bring in am  ----Pharmacist will need to enter order once prescription bottle received  -POCT ac and hs  --sliding scale coverage for now    Consultations:     [address] Hopkins, APRN - CNP   9/30/2021  9:45 PM    [address]   Dc Campos [address] [place] [address], [address] [place] [address]. Phone [phone]   Attending Physician Statement    I have discussed the case of Oleh Apley., including pertinent history and exam findings with the resident. I have seen and examined the patient and the key elements of the encounter have been performed by me. I agree with the assessment, plan, and orders as documented by the resident. He has acute on chronic respiratory failure with hypoxemia he is obese and is known to have COPD and obstructive sleep apnea. It is my impression that he has a superadded respiratory infection.   Time spent more than 30 minutes  Electronically signed by Rae Valiente MD on 10/1/2021 at 12:28 PM

## 2021-10-01 NOTE — PROGRESS NOTES
Writer at bedside for assessment. Patient is alertX4 on 6L NC with oxygen saturation in 93-97%. Patient is sitting on the edge of the bed, SOB without distress. Writer offer Bipap to patient but patient declined at this time. Will continue monitor.

## 2021-10-01 NOTE — ED NOTES
Report given to Erum Sierra RN from ICU. Report method in person   The following was reviewed with receiving RN:   Current vital signs:  BP (!) 109/51   Pulse 92   Temp 99.9 °F (37.7 °C) (Oral)   Resp 19   Ht 6' (1.829 m)   Wt (!) 412 lb (186.9 kg)   SpO2 97%   BMI 55.88 kg/m²                      Any medication or safety alerts were reviewed. Any pending diagnostics and notifications were also reviewed, as well as any safety concerns or issues, abnormal labs, abnormal imaging, and abnormal assessment findings. Questions were answered.             Everette Cooks, RN  09/30/21 3871

## 2021-10-01 NOTE — PROGRESS NOTES
2810 Feedback    PROGRESS NOTE             10/1/2021    1:56 PM    Name:   Kevin Cuba. MRN:     [id number]     Acct:      [de-identified]   Room:   2012/2012-01  IP Day:  1  Admit Date:  9/30/2021  6:08 PM    PCP:  Madison Hatchet, MD  Code Status:  DNR-CCA    Subjective:     C/C:   Chief Complaint   Patient presents with    Nasal Congestion    Cough    Rib Pain    Shortness of Breath     Interval History Status: improved. Patient is examined at bedside in ICU. He is sitting up in bed. Reports feeling better, no SOB on 5L of oxygen, which is weaned down to 3L/min. He reports persistent pain epigastric radiating laterally on both sides. Pain is 7 out of 10. Patient would like to quit cigarettes and is interested in chantix. Patient admits to feelings of anxiety and depression and denies suicidal thoughts. Brief History:     Patient is a 64 yo male with history of DM2, anemia secondary to CKD, HTN, CAD s/p stent, HTN, pancreatitis, BARBY, hydroceles, gout, presented to ED with 2 days of SOB and chest pain. PCP directed him to go to ED due to headache, chest and ribs sore from coughing, dyspnea, and fatigue. She wanted him to call 911 but patient refused due to not wanting to go back [facility], the closest hospital for him. PCP called his daughter who brought him to ED. Patient is vaccinated 2x with pfizer. Patient is a longtime smoker that quit 10 months ago. He started smoking 2 pack a day again 1 month ago. He is interested and motivated to quit. On 3L/min home O2. Review of Systems:     Review of Systems   Constitutional: Negative for diaphoresis and fever. HENT: Positive for congestion and hearing loss (b/l hearing aids). Negative for rhinorrhea. Eyes: Negative for discharge and redness. Respiratory: Positive for cough and shortness of breath (improving). Negative for wheezing.     Cardiovascular: Positive for chest pain (with breathing) and leg swelling. Gastrointestinal: Positive for abdominal pain (epigastric/ radiating to sides). Negative for abdominal distention, diarrhea, nausea and vomiting. Scrotal swelling   Musculoskeletal: Positive for gait problem (neuropathy, loss of balance w/ hx of falls). Skin: Positive for color change (stasis dermatitis b/l LE, arms, back). Negative for rash. Neurological: Positive for dizziness (w/ walking). Negative for headaches. Psychiatric/Behavioral: Positive for dysphoric mood. Negative for agitation, behavioral problems, self-injury and suicidal ideas. The patient is nervous/anxious. Medications: Allergies: Allergies   Allergen Reactions    Levofloxacin Anaphylaxis     Patient reports needing epinephrine \"about 5 or 6 months ago\" for anaphylaxis (itching, hives, SOB/swelling) after receiving Levofloxacin. Previous report from 08/20/2012: Nausea/Vomiting    Lorazepam      Falls      Nsaids      CHF&CKD    Prozac [Fluoxetine Hcl] Other (See Comments)     Pt started with seizures after started taking.  Vancomycin Other (See Comments)     Itching, SOB, emesis upon infusion in ED 6/12/2019. Patient states he has had vancomycin \"a number of times\" before without issue. couldn't breath and talk, throat closed       Current Meds:   Scheduled Meds:    furosemide  20 mg IntraVENous BID    sodium polystyrene  30 g Oral Once    [START ON 10/2/2021] methylPREDNISolone  60 mg IntraVENous Q12H    nicotine  1 patch TransDERmal Daily    insulin lispro  0-18 Units SubCUTAneous TID WC    insulin lispro  0-9 Units SubCUTAneous Nightly    insulin glargine  25 Units SubCUTAneous Nightly    allopurinol  100 mg Oral Daily    amitriptyline  50 mg Oral Nightly    aspirin  81 mg Oral Daily    clopidogrel  75 mg Oral Daily    docusate sodium  100 mg Oral BID    escitalopram  10 mg Oral Daily    ferrous sulfate  325 mg Oral Daily    budesonide-formoterol  2 puff Inhalation BID    gabapentin  300 mg Oral BID    latanoprost  1 drop Both Eyes Nightly    [Held by provider] lisinopril  5 mg Oral Daily    metoprolol tartrate  50 mg Oral BID    miconazole   Topical BID    pantoprazole  40 mg Oral QAM AC    rosuvastatin  10 mg Oral Nightly    [Held by provider] spironolactone  50 mg Oral Once per day on Mon Thu    venlafaxine  75 mg Oral Daily    vitamin B-12  500 mcg Oral Daily    Vitamin D  1,000 Units Oral Daily    sodium chloride flush  5-40 mL IntraVENous 2 times per day    ipratropium-albuterol  1 ampule Inhalation Q4H WA    guaiFENesin  1,200 mg Oral BID     Continuous Infusions:    dextrose      heparin (PORCINE) Infusion Stopped (10/01/21 1132)    sodium chloride       PRN Meds: glucose, dextrose, glucagon (rDNA), dextrose, heparin (porcine), heparin (porcine), butalbital-APAP-caffeine, melatonin, nitroGLYCERIN, oxyCODONE HCl, tiZANidine, sodium chloride flush, sodium chloride, ondansetron **OR** ondansetron, polyethylene glycol, acetaminophen **OR** acetaminophen, albuterol    Data:     Past Medical History:   has a past medical history of Acute on chronic kidney failure ([facility] [address] Acute on chronic respiratory failure ([address] Adhesive capsulitis of left shoulder, Anxiety, Arthropathy, unspecified, other specified sites, Asthma, B12 deficiency, Bilateral lower leg cellulitis, Blood in stool, CAD (coronary artery disease), Cardiovascular stress test abnormal, Cellulitis of both lower extremities, Cellulitis of leg, left, CHF (congestive heart failure), NYHA class III ([facility]), Chronic back pain, Chronic bronchitis ([address] Chronic headaches, Chronic respiratory failure ([address] Chronic ulcer of left leg, with fat layer exposed ([address] Class 2 severe obesity due to excess calories with serious comorbidity and body mass index (BMI) of 35.0 to 35.9 in [facility]), COPD exacerbation ([address] Diabetic neuropathy ([facility] [address] Displacement of lumbar intervertebral disc without Sister     Diabetes Sister      Vitals:  /65   Pulse 86   Temp 98 °F (36.7 °C) (Oral)   Resp 20   Ht 6' (1.829 m)   Wt (!) 412 lb (186.9 kg)   SpO2 98%   BMI 55.88 kg/m²   Temp (24hrs), Av.1 °F (37.3 °C), Min:98 °F (36.7 °C), Max:99.9 °F (37.7 °C)    Recent Labs     10/01/21  0732 10/01/21  1219   POCGLU 353* 488*       I/O(24Hr): Intake/Output Summary (Last 24 hours) at 10/1/2021 1356  Last data filed at 10/1/2021 0100  Gross per 24 hour   Intake 530.69 ml   Output 1000 ml   Net -469.31 ml       Labs:  [unfilled]    Lab Results   Component Value Date/Time    SPECIAL NOT REPORTED 2019 09:00 AM     Lab Results   Component Value Date/Time    CULTURE (A) 2019 09:00 AM     STREPTOCOCCI, BETA HEMOLYTIC GROUP G  MODERATE GROWTH      CULTURE DIPHTHEROIDS  MODERATE GROWTH   (A) 2019 09:00 AM    CULTURE NO ANAEROBIC ORGANISMS ISOLATED  AT 5 DAYS   (A) 2019 09:00 AM     [unfilled]    Radiology:    CT CHEST WO CONTRAST    Result Date: 2021  EXAMINATION: CT OF THE CHEST WITHOUT CONTRAST 2021 1:56 am TECHNIQUE: CT of the chest was performed without the administration of intravenous contrast. Multiplanar reformatted images are provided for review. Dose modulation, iterative reconstruction, and/or weight based adjustment of the mA/kV was utilized to reduce the radiation dose to as low as reasonably achievable. COMPARISON: CT PA dated 2018. HISTORY: ORDERING SYSTEM PROVIDED HISTORY: high risk chest pain, aortic dissection? TECHNOLOGIST PROVIDED HISTORY: high risk chest pain, aortic dissection? Reason for Exam: chest pain FINDINGS: Mediastinum:  No evidence of mediastinal, hilar or axillary lymphadenopathy. Aorta is normal in caliber without acute abnormality although evaluation is limited without IV contrast.  Aortic caliber is unchanged compared to the chest CTA of 2018. The central airways are clear.  Lungs/pleura: Aside from minor bibasilar atelectasis, the lungs are clear. No pneumothorax or pleural effusion. Upper Abdomen:  Limited images of the upper abdomen demonstrate no acute abnormality. Soft Tissues/Bones: Surrounding osseous and soft tissue structures are unremarkable. The IV was lost and contrast could not be given. Dissection therefore cannot be excluded. This noncontrast exam is normal for age. Findings discussed with the ordering physician 09/11/2021 at 0321 hours. US RENAL COMPLETE    Result Date: 9/12/2021  EXAMINATION: RETROPERITONEAL ULTRASOUND OF THE KIDNEYS AND URINARY BLADDER 9/12/2021 COMPARISON: CT abdomen pelvis from 12/03/2015 HISTORY: ORDERING SYSTEM PROVIDED HISTORY: lakisha TECHNOLOGIST PROVIDED HISTORY: lakisha 26-year-old male with acute kidney injury FINDINGS: Kidneys: Exam is limited due to patient's body habitus. Right kidney measures 12.5 x 6.7 x 6.8 cm. Right renal cortical thickness measures 1.3 cm. The left kidney was difficult to properly visualize. No right-sided hydronephrosis or perinephric fluid. Gross preservation of the right-sided corticomedullary differentiation. Bladder: Urinary bladder is empty. 1. Limited visualization of the right kidney grossly unremarkable. No right-sided hydronephrosis. 2. Left kidney was difficult to properly visualize due to patient's body habitus. 3. Empty urinary bladder.      XR CHEST PORTABLE    Result Date: 10/1/2021  EXAMINATION: ONE XRAY VIEW OF THE CHEST 10/1/2021 5:49 am COMPARISON: 30 September 2021 HISTORY: ORDERING SYSTEM PROVIDED HISTORY: AECOPD TECHNOLOGIST PROVIDED HISTORY: AECOPD Reason for Exam: AECOPD Acuity: Unknown Type of Exam: Unknown Additional signs and symptoms: AECOPD Relevant Medical/Surgical History: AECOPD FINDINGS: AP portable view of the chest time stamped at 537 hours demonstrates overlying cardiac monitoring electrodes, stable cardiomegaly, pulmonary vascular congestion, and bibasilar opacities favoring atelectasis or interstitial edema over infectious process. Trace effusions are noted. No extrapleural air. Osseous structures are unremarkable. Cardiomegaly. Pulmonary vascular congestion, trace effusions and bibasilar opacities favoring interstitial edema or atelectasis over infectious process. XR CHEST PORTABLE    Result Date: 9/30/2021  EXAMINATION: ONE XRAY VIEW OF THE CHEST 9/30/2021 6:53 pm COMPARISON: 09/11/2021 HISTORY: ORDERING SYSTEM PROVIDED HISTORY: shortness of breath TECHNOLOGIST PROVIDED HISTORY: shortness of breath Reason for Exam: Shortness of breath, hx CHF and copd Acuity: Acute Type of Exam: Initial Additional signs and symptoms: Shortness of breath, hx CHF and copd Relevant Medical/Surgical History: Shortness of breath, hx CHF and copd FINDINGS: Similar appearing prominence of the cardiac silhouette. Mild interstitial indistinctness. Mild bibasilar linear appearing opacifications are favored to represent atelectasis. No convincing focal consolidation. Mild blunting the right costophrenic angle. The left costophrenic is not included in the field of view. No evidence of pneumothorax. No acute osseous abnormalities. 1. Similar appearing prominence of the cardiac silhouette. 2. Mild interstitial indistinctness may related to vascular congestion. 3. Bibasilar linear opacifications are favored to represent atelectasis. No convincing focal consolidation. XR CHEST PORTABLE    Result Date: 9/11/2021  EXAMINATION: ONE XRAY VIEW OF THE CHEST 9/11/2021 12:57 am COMPARISON: 03/28/2019 HISTORY: ORDERING SYSTEM PROVIDED HISTORY: chest pain TECHNOLOGIST PROVIDED HISTORY: chest pain Reason for Exam: upright FINDINGS: Portable study is limited by obesity. Mild right basilar opacity. Lungs are otherwise grossly clear. No pneumothorax or pleural fluid. Heart size is within normal limits. No acute bone finding.      Right basilar opacity may represent airspace disease or superimposed shadows on this portable study obtained on an obese patient. When the patient is able, a follow-up PA and lateral examination of the chest would be helpful. US SCROTUM W LIMITED DUPLEX    Result Date: 9/3/2021  EXAMINATION: ULTRASOUND OF THE SCROTUM/TESTICLES WITH COLOR DOPPLER FLOW EVALUATION 9/3/2021 COMPARISON: None. HISTORY: ORDERING SYSTEM PROVIDED HISTORY: Edema of scrotum TECHNOLOGIST PROVIDED HISTORY: This procedure can be scheduled via [employer]. Access your [employer] account by visiting [email or web address]. FINDINGS: Measurements: Right testicle: 5.0 x 2.1 x 3.0 cm Left testicle: 4.8 x 2.4 x 3.0 cm Right: Grey scale: The right testicle demonstrates unremarkable homogeneous echotexture without focal lesion. No evidence of testicular microlithiasis. Doppler Evaluation:  There is normal arterial and venous Doppler flow within the testicle. Scrotal Sac: Scrotal swelling. Large hydrocele with low-level internal echoes. Epididymis:  Nonvisualized. Left: Grey scale: The left testicle demonstrates unremarkable homogeneous echotexture without focal lesion. No evidence of testicular microlithiasis. Doppler Evaluation:  There is normal arterial and venous Doppler flow within the testicle. Scrotal Sac:  Scrotal swelling. Large hydrocele with low-level internal echoes and scrotal pearls. Epididymis:  Nonvisualized. Scrotal swelling/edema bilaterally with large complex hydroceles, and scrotal pearls on the left. Nonvisualization epididymides. No testicular abnormality. Physical Examination:        Physical Exam  Constitutional:       General: He is not in acute distress. Appearance: He is not ill-appearing, toxic-appearing or diaphoretic. HENT:      Head: Normocephalic and atraumatic. Nose: Congestion present. No rhinorrhea. Eyes:      Extraocular Movements: Extraocular movements intact. Conjunctiva/sclera: Conjunctivae normal.   Cardiovascular:      Rate and Rhythm: Normal rate and regular rhythm.    Pulmonary:      Effort: Pulmonary effort is normal. No respiratory distress. Breath sounds: No stridor. Rales (bilaterally lower bases) present. No wheezing or rhonchi. Chest:      Chest wall: No tenderness. Abdominal:      General: There is distension. Palpations: Abdomen is soft. Tenderness: There is abdominal tenderness. There is no guarding. Genitourinary:     Comments: Scrotal swelling  Musculoskeletal:      Right lower leg: Edema present. Left lower leg: Edema present. Skin:     General: Skin is warm and dry. Comments: Stasis dermatitis b/l LE, arms     Neurological:      Mental Status: He is alert. Mental status is at baseline. Psychiatric:         Mood and Affect: Mood normal.         Behavior: Behavior normal.       Assessment:        Primary Problem  Acute on chronic respiratory failure with hypoxia [facility])    Active Hospital Problems    Diagnosis Date Noted    NSTEMI (non-ST elevated myocardial infarction) ([facility] [I21.4]     Acute on chronic respiratory failure with hypoxia ([facility] [J96.21] 09/30/2021    Elevated troponin [R77.8]     [facility] mellitus type 2 in obese [facility] [E11.69, E66.9] 02/06/2014    Anemia of chronic renal failure, stage 3b (HCC) [R14.51, D63.1] 05/08/2013    Acute kidney injury superimposed on CKD ([facility] [N17.9, N18.9] 04/10/2013    Hypertension [I10]      Patient is a 62 yo male with history of DM2, anemia secondary to CKD, HTN, CAD s/p stent, HTN, pancreatitis, BARBY, hydroceles, gout, presented to ED with 2 days of SOB and chest pain. PCP directed him to go to ED due to headache, chest and ribs sore from coughing, dyspnea, and fatigue. Plan:        1. Acute on chronic respiratory failure 2/2 COPD vs URI    a. Patient was weaned back to home level of NC 3L/min   b. Methylprednisolone weaned to 60mg q12h   c. Albuterol nebulizer q2h prn   d. Symbicort 2 puff bid   e. Ipratropium albuterol q4h   f.  CXR showed fluid overload; clinical improvement w/ steroids, Ddimer   1.69, consider VQ scan if suspicion for PE   g. Sputum gram stain, resp culture pending    h. Pulm consulted, recs appreciated    2. Hyperglycemia 2/2 uncontrolled DM Type 2   a. Patient apparently on 500U/ ml regular insulin, 0.52ml at breakfast,   0.52ml at lunch, 0.45ml at dinner. Called to confirm with pharmacy   and PCP. Patient reports doses as 0.72/ 0.72/ 0.4ml. We will start   with 150U tid   b. Glucose 488/ high, RN not comfortable giving such high dose without IV   access, awaiting PICC team placement. c. POCT glucose check qid, hypoglycemia protocol   d. Adjust glucose dose as needed as steroids are decreased    3. LIZETH on CKD    a. Avoid NSAIDS    b. Trend Cr    c. Nephrology consulted, recs appreciated     i. Kayexalate, lasix ordered    4. NSTEMI   a. 9/11/21 Cath report showed stenosis 20-30% LAD, 10-20% LCX, 10-20%   RCA   b. Troponin HS: 127 > 130, will trend troponin    5. BARBY   a. Has home CPAP, has not been using recently as it's not working    6. Gout    a. Allopurinol 100mg daily    7. Tobacco cessation   a. Patient motivated to quit cigarettes again. Discussed strategies such as holding a straw instead of a cigarette. Can follow up with PCP to discuss chantix. Patient is amenable to using bupropion, which would benefit patient from a smoking and mood disorder perspective. Patient does not like nicotine patches as they don't stick to him.     Milton Andrews MD  10/1/2021  1:56 PM

## 2021-10-01 NOTE — PLAN OF CARE
Informed by RN that patient has still not gotten a PICC line. I contacted the PICC team earlier today [phone] to see what the hold up was. They said they are off-site and unable to give an ETA. I also contacted the patient's PCP and pharmacy to confirm the insulin dose, as his glucose >500. Orders placed for home med insulin 500U given at lower dose earlier today. Informed by RN that patient has not been getting heparin due to no PICC placement. SCDs ordered. RN called to ask for central line which was previously ordered.

## 2021-10-01 NOTE — PROGRESS NOTES
Dr. Flakito Armenta updated New consult and recent labs. Patient K 5.9 this morning and increased creatine. Orders received to give sliding scale dose now. Kayexalate 30, one dose IV lasix, order for urine sodium electrolytes.  Repeat Potassium at 1000

## 2021-10-01 NOTE — PROGRESS NOTES
[address] [facility]    OCCUPATIONAL THERAPY MISSED TREATMENT NOTE   INPATIENT   Date: 10/1/21  Patient Name: Chastity Perez.        Room:   MRN: [id number]   Account #: [de-identified]    : 1965  (59 y.o.)  Gender: male                 REASON FOR MISSED TREATMENT:  Patient unable to participate   -    NEEDS EVAL 10-1-21 Sitting on side of bed with Nursing & Medical discussing need for PICC line;  Denies P.T. needs;  will reattempt to contact to assess for OT needs  (DM)      Alyce Boyd, OT

## 2021-10-01 NOTE — PROGRESS NOTES
Pt. transferred from ED to ICU room 2012 on 6L nasal cannula. O2 saturation reads 94% w/ appropriate pleth. Patient sat up at edge of bed to urinate and has some slight dypsnea with exertion but maintained SpO2 saturation > 92%. Patient placed on appropriate monitoring equipment, oriented to new room, educated on use of call light. Patient expresses no additional needs at this time.

## 2021-10-01 NOTE — PROGRESS NOTES
Requested Dr Kelechi Pat order PICC placement due to inability to establish iv access by two icu nurses. Dr requested to ask if ok with nephrology. Nephrology paged. 11: 48 Dr Deepika Macario gave permission for picc line placement, access rn line called to get line placed.

## 2021-10-01 NOTE — CONSULTS
Department of Internal Medicine  Nephrology Justin Pritchett MD   Consult Note    Reason for consultation: Management of acute kidney injury and chronic kidney disease stage III. Consulting physician: Antoine Paz MD    History of present illness: This is a 63 y.o. male with a significant past medical history of coronary artery disease, heart failure with preserved ejection fraction [HFrEF with LVEF 55%], chronic pancreatitis, degenerative disc disease, obstructive sleep apnea chronic kidney disease stage III [baseline serum creatinine 1.4 to 1.5 mg/dL], who presented to the emergency department for further evaluation of worsening shortness of breath. He had been recently admitted to [facility] for management of unstable angina and discharged on 9/14/2021. He underwent cardiac catheterization at [facility] on 9/11/2021 with finding of nonobstructive coronary artery disease. Laboratory studies at presentation on 9/30/2021 were remarkable for serum potassium 5.3 mmol/L and BUN/creatinine 66/2.41 mg/dL and hence nephrology consultation. X-ray today showed cardiomegaly, pulmonary vascular congestion and trace bilateral pleural effusions. He has received a total of 60 mg of IV Lasix since admission and is on heparin drip. Home medications included Bumex 1 mg p.o. 3 times daily and spironolactone 50 mg p.o. twice a week. He states that he has lost 31 pounds by diet in the last 8 months.     Levofloxacin, Lorazepam, Nsaids, Prozac [fluoxetine hcl], and Vancomycin    Past Medical History:   Diagnosis Date    Acute on chronic kidney failure ([facility] [address] 07/20/2017    Acute on chronic respiratory failure ([facility] [address] 10/02/2018    Adhesive capsulitis of left shoulder 03/25/2017    Anxiety 10/02/2016    smokes marijuana for this    Arthropathy, unspecified, other specified sites 06/13/2013    Asthma     B12 deficiency     Bilateral lower leg cellulitis 02/17/2016    Blood in stool     CAD (coronary artery disease)     Cardiovascular stress test abnormal 2018    Cellulitis of both lower extremities 05/25/2017    Cellulitis of leg, left 07/20/2017    CHF (congestive heart failure), NYHA class III (HCC) 08/14/2013    Chronic back pain     Chronic bronchitis (HCC)     Chronic headaches     was referred to neuro, testing scheduled    Chronic respiratory failure ([facility] [address]     was on vent    Chronic ulcer of left leg, with fat layer exposed ([address] 02/22/2019    healed    Class 2 severe obesity due to excess calories with serious comorbidity and body mass index (BMI) of 35.0 to 35.9 in adult ([address]     (BMI 35.0-39.9 without comorbidity)    COPD exacerbation ([address] 11/02/2016    Diabetic neuropathy ([facility] [address] 08/14/2013    Displacement of lumbar intervertebral disc without myelopathy 06/13/2013    Ear infection     RIGHT    Essential hypertension     Facial cellulitis 2012    Fall 03/25/2017    GERD (gastroesophageal reflux disease)     Head injury     Hearing loss in right ear     pencil pierced ear as a child    Hepatic steatosis 12/03/2015    History of general anesthesia complication     has woke up during surgery under anesthesia    History of rib fracture 12/03/2015    Chronic     Hyperlipidemia     Hypersomnia     can go multiple days without sleeping    Hypertension     Insomnia     Intolerance of continuous positive airway pressure (CPAP) ventilation 07/20/2017    Iron deficiency     Localized rash     gets frequently in axilla, groin, in any fold, on several topical treatments for this    Magnesium deficiency     Mastoiditis of left side     Mixed conductive and sensorineural hearing loss of both ears 01/10/2017    Per ENT    Mixed type COPD (chronic obstructive pulmonary disease) ([address]     On home O2, multiple inhlaers, nebulizer    Moderate recurrent major depression ([address] 10/02/2016    Morbid obesity with BMI of 45.0-49.9, adult ([address] 06/16/2015    On home oxygen therapy 3 Lpm prn    Open wound of groin 12/19/2018    healed     BARBY on CPAP     Osteoarthritis     Otitis externa of left ear     Pancreatitis chronic     Persistent depressive disorder 11/19/2019    Renal insufficiency     proteinuria    Severe depression ([address] 09/25/2013    Spinal stenosis of lumbar region without neurogenic claudication 01/06/2016    MRI lumbar 12/30/15 L3-L4: There is broad-based bulging disc which appears protruding left laterally causing flattening of the ventral thecal sac. In addition, there is facet arthropathy with mild hypertrophic changes.  There is borderline central canal stenosis with  evidence of moderate left neural foraminal narrowing and mild right neural foramina narrowing.   L4-L5: There is broad-based protrud    Syncope 04/28/2017    Tinnitus of both ears 01/10/2017    Per ENT    Type 2 diabetes mellitus with stage 3 chronic kidney disease, with long-term current use of insulin ([address] 12/26/2016    due to underlying condition with hyperosmolarity without coma    Type II or unspecified type diabetes mellitus without mention of complication, not stated as uncontrolled     uncontrolled    Vitamin D deficiency     Wears glasses     for reading     Scheduled Meds:   methylPREDNISolone  60 mg IntraVENous Q6H    insulin lispro  0-12 Units SubCUTAneous TID WC    insulin lispro  0-6 Units SubCUTAneous Nightly    insulin glargine  10 Units SubCUTAneous Nightly    allopurinol  100 mg Oral Daily    amitriptyline  50 mg Oral Nightly    aspirin  81 mg Oral Daily    clopidogrel  75 mg Oral Daily    docusate sodium  100 mg Oral BID    escitalopram  10 mg Oral Daily    ferrous sulfate  325 mg Oral Daily    budesonide-formoterol  2 puff Inhalation BID    gabapentin  300 mg Oral BID    latanoprost  1 drop Both Eyes Nightly    [Held by provider] lisinopril  5 mg Oral Daily    magnesium oxide  400 mg Oral Daily    metoprolol tartrate  50 mg Oral BID    miconazole Topical BID    pantoprazole  40 mg Oral QAM AC    rosuvastatin  10 mg Oral Nightly    [Held by provider] spironolactone  50 mg Oral Once per day on     venlafaxine  75 mg Oral Daily    vitamin B-12  500 mcg Oral Daily    Vitamin D  1,000 Units Oral Daily    sodium chloride flush  5-40 mL IntraVENous 2 times per day    ipratropium-albuterol  1 ampule Inhalation Q4H WA    guaiFENesin  1,200 mg Oral BID     Continuous Infusions:   dextrose      heparin (PORCINE) Infusion 9.3 Units/kg/hr (10/01/21 0639)    sodium chloride       PRN Meds:.glucose, dextrose, glucagon (rDNA), dextrose, heparin (porcine), heparin (porcine), butalbital-APAP-caffeine, melatonin, nitroGLYCERIN, oxyCODONE HCl, tiZANidine, sodium chloride flush, sodium chloride, ondansetron **OR** ondansetron, polyethylene glycol, acetaminophen **OR** acetaminophen, albuterol    Family History   Problem Relation Age of Onset    Heart Disease Mother          age 64 from MI   Ethlyn Jos High Blood Pressure Mother     Diabetes Mother     High Blood Pressure Father          age 80 from CKD and Lung Fibrosis    Kidney Disease Father     Heart Disease Sister     Heart Attack Sister     Obesity Sister     Diabetes Sister      Social History     Socioeconomic History    Marital status:      Spouse name: Magda Erickson Number of children: 11    Years of education: None    Highest education level: None   Occupational History    Occupation: disability     Comment: unemployed   Tobacco Use    Smoking status: Former Smoker     Packs/day: 0.25     Years: 33.00     Pack years: 8.25     Types: Cigarettes     Start date: 1985     Quit date: 2020     Years since quittin.9    Smokeless tobacco: Former User     Types: Snuff     Quit date: 1995   Vaping Use    Vaping Use: Never used   Substance and Sexual Activity    Alcohol use: No     Alcohol/week: 0.0 standard drinks    Drug use: Not Currently     Types: Marijuana Comment: Patient reports he quit using THC for 26 days on 7/20/2021    Sexual activity: Not Currently   Other Topics Concern    None   Social History Narrative    Middle of possible separation 6/22/2016          Social Determinants of Health     Financial Resource Strain: Low Risk     Difficulty of Paying Living Expenses: Not hard at all   Food Insecurity: No Food Insecurity    Worried About Running Out of Food in the Last Year: Never true    Basil of Food in the Last Year: Never true   Transportation Needs: No Transportation Needs    Lack of Transportation (Medical): No    Lack of Transportation (Non-Medical): No   Physical Activity:     Days of Exercise per Week:     Minutes of Exercise per Session:    Stress:     Feeling of Stress :    Social Connections:     Frequency of Communication with Friends and Family:     Frequency of Social Gatherings with Friends and Family:     Attends Taoism Services:     Active Member of Clubs or Organizations:     Attends Club or Organization Meetings:     Marital Status:    Intimate Partner Violence:     Fear of Current or Ex-Partner:     Emotionally Abused:     Physically Abused:     Sexually Abused:      Review of systems: CNS - no headache or dizziness; Cardiac - no chest pain; Respiratory - +ve shortness of breath; Gastrointestinal - No nausea, vomiting or diarrhea; Musculoskeletal - general body aches; Skin/Integument - no rashes.     Physical Exam:    VITALS:  BP (!) 148/68   Pulse 81   Temp 98 °F (36.7 °C) (Oral)   Resp 20   Ht 6' (1.829 m)   Wt (!) 412 lb (186.9 kg)   SpO2 99%   BMI 55.88 kg/m²   24HR INTAKE/OUTPUT:    Intake/Output Summary (Last 24 hours) at 10/1/2021 1118  Last data filed at 10/1/2021 0100  Gross per 24 hour   Intake 530.69 ml   Output 1000 ml   Net -469.31 ml     Constitutional: alert, appears stated age and cooperative    Skin: Skin color, texture, turgor normal. No rashes or lesions    Head: Normocephalic, without obvious abnormality, atraumatic     Cardiovascular/Edema: regular rate and rhythm, S1, S2 normal, no murmur, click, rub or gallop    Respiratory: diminished breath sounds bilaterally    Abdomen: soft, non-tender; bowel sounds normal; no masses,  no organomegaly    Back: symmetric, no curvature. ROM normal. No CVA tenderness. Extremities: edema Trace to 1+ bilateral pedal edema which patient states is improved with chronic stasis dermatitis    Neuro:  Grossly normal    CBC:   Recent Labs     09/30/21  1947 10/01/21  0447   WBC 7.0 9.2   HGB 7.8* 8.3*    175     BMP:    Recent Labs     09/30/21  1947 10/01/21  0447    133*   K 5.3 5.9*   CL 99 94*   CO2 23 23   BUN 66* 65*   CREATININE 2.41* 2.36*   GLUCOSE 205* 319*     Lab Results   Component Value Date    NITRU NEGATIVE 09/12/2021    COLORU YELLOW 09/12/2021    PHUR 6.0 09/12/2021    WBCUA None 02/17/2016    RBCUA 0 TO 2 02/17/2016    MUCUS NOT REPORTED 02/17/2016    TRICHOMONAS NOT REPORTED 02/17/2016    YEAST NOT REPORTED 02/17/2016    BACTERIA NOT REPORTED 02/17/2016    CLARITYU Clear 10/31/2018    SPECGRAV 1.010 09/12/2021    LEUKOCYTESUR NEGATIVE 09/12/2021    UROBILINOGEN Normal 09/12/2021    BILIRUBINUR NEGATIVE 09/12/2021    BILIRUBINUR neg 06/30/2016    BLOODU Negative 10/31/2018    GLUCOSEU NEGATIVE 09/12/2021    KETUA NEGATIVE 09/12/2021    AMORPHOUS NOT REPORTED 02/17/2016     Urine Sodium:     Lab Results   Component Value Date    MARIA L 84 09/12/2021     Urine Potassium:    Lab Results   Component Value Date    KUR 28.4 07/10/2017     Urine Chloride:    Lab Results   Component Value Date    CLUR 73 09/12/2021     Urine Osmolarity:   Lab Results   Component Value Date    OSMOU 363 02/17/2016     Urine Protein:     Lab Results   Component Value Date    TPU <4 09/12/2021     Urine Creatinine:     Lab Results   Component Value Date    LABCREA 38.5 01/20/2021     IMPRESSION/RECOMMENDATIONS:      1.   Acute kidney injury - most consistent with prerenal azotemia from diuretic therapy. However, given clinical and radiologic evidence of pulmonary edema, we will continue diuretics. Plan: IV Lasix 20 mg twice daily. Random urine protein/creatinine ratio. Monitor urine output closely. Basic metabolic profile daily. 2.  Hyperkalemia - likely secondary to diminished GFR and use of potassium sparing diuretic, spironolactone. We will treat with 50% dextrose, insulin and oral Kayexalate. 3.  Systemic hypertension - blood pressure control is adequate. 4.  Hyponatremia- Secondary to fluid translocation consequent to hyperglycemia. Corrected serum sodium is 136 mmol/L. Prognosis is guarded. Thank you very much for the courtesy and confidence of this consultation.       Bud Carrera MD FACP  Attending Nephrologist  10/1/2021 9:16 AM

## 2021-10-01 NOTE — PROGRESS NOTES
Physical Therapy        Physical Therapy Cancel Note      DATE: 10/1/2021    NAME: Kate Dee.   MRN: [id number]   : 1965      Patient not seen this date for Physical Therapy due to:    Pt denies need- will follow pt and once in PCU and able to mobilize more will recheck for any needs      Electronically signed by Donnie Lopez PT on 10/1/2021 at 2:51 PM

## 2021-10-01 NOTE — CARE COORDINATION
CASE MANAGEMENT NOTE:    Admission Date:  9/30/2021 Martin Carter is a 63 y.o.  male    Admitted for : NSTEMI (non-ST elevated myocardial infarction) ([facility] [address] [I21.4]  Acute kidney injury ([facility] [address] [N17.9]  Acute on chronic respiratory failure with hypoxia ([facility] [address] [J96.21]    Met with:  Patient's Wife, Marcella Fitzpatrick, Via Phone    PCP:  TANISHA Grace 1116:  Baron Wise      Is patient alert and oriented at time of discussion:  Yes,could not hear writer, asked me to speak to his wife. Current Residence/ Living Arrangements:  independently at home w/ Wife & Family             Current Services PTA:  Yes, Med Management at [facility]. Does patient go to outpatient dialysis: No  If yes, location and chair time: NA    Is patient agreeable to VNS: No    Freedom of choice provided:  Yes    List of [address] provided: No    VNS chosen:  No, Wife denies the need    DME:  straight cane, walker, wheelchair and shower chair,GB    Home Oxygen: Yes, Wears 3LNC, as Needed, Has Concentrator, No Port    Nebulizer: Yes    CPAP/BIPAP: Yes, CPAP    Supplier: Wife, unsure of name    Potential Assistance Needed: Will follow    SNF needed: No    Freedom of choice and list provided: NA    Pharmacy:  [facility]       Does Patient want to use MEDS to BEDS? No    Is patient currently receiving oral anticoagulation therapy? No    Is the Patient an JEMIMA ESCUDERO [facility] with Readmission Risk Score greater than 14%? No  If yes, pt needs a follow up appointment made within 7 days. Family Members/Caregivers that pt would like involved in their care:    Yes    If yes, list name here:  W[place]fe, Marcella Fitzpatrick, Daughter Adriana Villafuerte    Transportation Provider:  Daughter             Discharge Plan:  10/1/21 [facility] Pt. Lives in 2 story home, w/ Liveable 1st floor, w/ Wife. DME GB, W[employer], Walker, [employer], [place]. Oxygen, wears 3LNC, as Needed, Has Concentrator, No Port, CPAP, unsure who supplies Oxygen. Rene, 99% on [facility], Denies VNS. PT/OT, Pulm/Nephro, K+ 5.9, Cr 2.36, Heparin GTT.  IV Steroids, 60MG, IV lasix X2, Will follow for TY [facility], [facility] or any other needs//KB                Electronically signed by: Mendoza Torres RN on 10/1/2021 at 11:08 AM

## 2021-10-01 NOTE — ED NOTES
Pt. Calls out after getting up to use urinal and pt. Began to feel SOB and increased difficulty breathing. Pt. O2 increased to 8L with no improvement. Pt. Placed on non-rebreather and O2 increased to 100% after several minutes. Dr. Arianne Munoz notified and requesting respiratory to place pt. On bipap.      Justine Kiran RN  09/30/21 [id number]

## 2021-10-01 NOTE — PLAN OF CARE
Problem: Pain:  Goal: Pain level will decrease  Outcome: Ongoing  Note: Pt Continued to assess pain level with appropriate pain scale. Goal: Control of acute pain  Outcome: Ongoing  Goal: Control of chronic pain  Outcome: Ongoing     Problem: Falls - Risk of:  Goal: Will remain free from falls  Outcome: Ongoing  Note: Pt remains free of falls this shift. Approprate safety measures in place   Goal: Absence of physical injury  Outcome: Ongoing     Problem: Skin Integrity:  Goal: Will show no infection signs and symptoms  Outcome: Ongoing  Goal: Absence of new skin breakdown  Outcome: Ongoing  Goal: Risk for impaired skin integrity will decrease  Outcome: Ongoing     Problem:  Activity:  Goal: Fatigue will decrease  Outcome: Ongoing     Problem: Cardiac:  Goal: Hemodynamic stability will improve  Outcome: Ongoing     Problem: Coping:  Goal: Level of anxiety will decrease  Outcome: Ongoing  Goal: Ability to cope will improve  Outcome: Ongoing  Goal: Ability to establish a method of communication will improve  Outcome: Ongoing     Problem: Nutritional:  Goal: Consumption of the prescribed amount of daily calories will improve  Outcome: Ongoing     Problem: Respiratory:  Goal: Ability to maintain a clear airway will improve  Outcome: Ongoing  Goal: Ability to maintain adequate ventilation will improve  Outcome: Ongoing  Goal: Complications related to the disease process, condition or treatment will be avoided or minimized  Outcome: Ongoing

## 2021-10-01 NOTE — PROGRESS NOTES
Tiffanie Luna, [facility]. NSTEMI (non-ST elevated [facility] infarction) ([facility] [I21.4]  Acute kidney [facility] ([facility] [N17.9]  Acute on chronic respiratory failure with hypoxia ([facility] [J96.21] . Vitals:    10/01/21 1121   BP:    Pulse:    Resp:    Temp:    SpO2: 98%   . Patients home meds are   Prior to Admission medications    Medication Sig Start Date End Date Taking? Authorizing Provider   oxyCODONE HCl (OXY-IR) 10 MG immediate release tablet Take 1 tablet by mouth every 8 hours as needed for Pain for up to 30 days. 9/9/21 10/9/21 Yes Nadia Gamez MD   allopurinol (ZYLOPRIM) 100 MG tablet Take 1 tablet by mouth daily FOR GOUT. STOP IT IF ANY RASH DEVELOPS! 8/30/21  Yes Nadia Gamez MD   vitamin D3 (CHOLECALCIFEROL) 25 MCG (1000 UT) TABS tablet Take 1 tablet by mouth daily 8/25/21  Yes Nadia Gamez MD   docusate sodium (COLACE) 100 MG capsule Take 1 capsule by mouth 2 times daily For constipation 8/17/21  Yes Nadia Gamez MD   latanoprost (XALATAN) 0.005 % ophthalmic solution 1 drop nightly   Yes Historical Provider, MD   butalbital-acetaminophen-caffeine (FIORICET, ESGIC) -40 MG per tablet Take 1 tablet by mouth every 8 hours as needed for Headaches 8/13/21  Yes Angelia Dan MD   clopidogrel (PLAVIX) 75 MG tablet Take 1 tablet by mouth daily 8/11/21  Yes Nadia Gamez MD   pantoprazole (PROTONIX) 40 MG tablet Take 1 tablet by mouth every morning (before breakfast) 7/20/21  Yes Nadia Gamez MD   insulin regular human (HUMULIN R) 500 UNIT/ML concentrated injection vial Patient using 0.52ml with breakfast, 0.52ml with lunch and 0.45ml with dinner. Patient taking differently: Patient using 0.52ml with breakfast, 0.52ml with lunch and 0.40 ml with dinner.  6/28/21  Yes Nadia Gamez MD   escitalopram (LEXAPRO) 10 MG tablet Take 1 tablet by mouth daily 6/14/21  Yes Nadia Gamez MD   rosuvastatin (CRESTOR) 10 MG tablet Take 1 tablet by mouth nightly Stop pravastatin 5/21/21  Yes Debra Calvillo MD   Gauze Bandages (ROLLED GAUZE BANDAGE 4\"X2. 5YD) MISC For twice a day dressing 4/23/21  Yes Debra Calvillo MD   nystatin (MYCOSTATIN) 744318 UNIT/GM powder Apply 2 times daily in the skin folds for several months 3/31/21  Sunita Calvillo MD   tiZANidine (ZANAFLEX) 4 MG tablet TAKE ONE TABLET BY MOUTH EVERY 8 HOURS AS NEEDED FOR BACK PAIN 3/31/21  Sunita Calvillo MD   bumetanide (BUMEX) 1 MG tablet TAKE THREE TABLETS BY MOUTH TWICE A DAY 3/8/21  Sunita Calvillo MD   spironolactone (ALDACTONE) 50 MG tablet Take 1 tablet by mouth daily  Patient taking differently: Take 50 mg by mouth Twice a Week Takes on Tuesday and Thursday 2/24/21  Sunita Calvillo MD   ketoconazole (NIZORAL) 2 % cream Apply twice a day for yeast infection in the skin folds, for 4 weeks 1/20/21  Yes Debra Calvillo MD   albuterol (PROVENTIL) (2.5 MG/3ML) 0.083% nebulizer solution Take 3 mLs by nebulization every 6 hours as needed for Wheezing or Shortness of Breath 1/15/21  Sunita Calvillo MD   Ferrous Sulfate (IRON) 325 (65 Fe) MG TABS Take 1 tablet by mouth daily 11/25/20  Sunita Calvillo MD   metoprolol tartrate (LOPRESSOR) 50 MG tablet Take 1 tablet by mouth 2 times daily 11/4/20  Sunita Calvillo MD   magnesium oxide (MAG-OX) 400 (240 Mg) MG tablet Take 1 tablet by mouth daily 11/4/20  Sunita Calvillo MD   lisinopril (PRINIVIL;ZESTRIL) 5 MG tablet Take 1 tablet by mouth daily . Discontinued Lisinopril  10 mg 11/2/20  Sunita Calvillo MD   fluticasone-salmeterol (ADVAIR HFA) 230-21 MCG/ACT inhaler Inhale 2 puffs into the lungs 2 times daily 10/19/20  Sunita Calvillo MD   tiotropium (SPIRIVA RESPIMAT) 2.5 MCG/ACT AERS inhaler Inhale 2 puffs into the lungs daily 10/19/20  Yes Debra Calvillo MD   venlafaxine (EFFEXOR XR) 75 MG extended release capsule Take 1 capsule by mouth daily Take with food 10/14/20  Yes José Luis Gannon MD   vitamin B-12 (CYANOCOBALAMIN) 500 MCG tablet Take 1 tablet by mouth daily 7/13/20  Yes José Luis Gannon MD   Melatonin 10 MG TABS Take 10 mg by mouth nightly as needed (insomnia) 12/23/16  Yes José Luis Gannon MD   aspirin 81 MG EC tablet Take 81 mg by mouth daily. Yes Historical Provider, MD   Insulin Syringe-Needle U-100 31G X 5/16\" 1 ML MISC Use to subcutaneously inject insulin three times daily 9/22/21   José Luis Gannon MD   Continuous Blood Gluc Sensor (FREESTYLE CRISTINE 14 DAY SENSOR) MISC USE AS DIRECTED AND CHANGE EVERY 14 DAYS 9/15/21   José Luis Gannon MD   gabapentin (NEURONTIN) 300 MG capsule Take 1 po bid 8/13/21 11/13/21  Toy Talley MD   nitroGLYCERIN (NITROSTAT) 0.4 MG SL tablet DISSOLVE 1 TAB UNDER TONGUE FOR CHEST PAIN - IF PAIN REMAINS AFTER 5 MIN, CALL 911 AND REPEAT DOSE. MAX 3 TABS IN 15 MINUTES 8/2/21   José Luis Gannon MD   Continuous Blood Gluc Sensor (FREESTYLE CRISTINE 2 SENSOR) [facility] [place] to apply a new sensor every 14 days. RX to cover voucher patient will bring in. 5/19/21   José Luis Gannon MD   Gauze Pads & Dressings 4\"X4\" PADS Twice a day dressing of right leg wound 4/23/21   José Luis Gannon MD   PROAIR  (90 Base) MCG/ACT inhaler INHALE TWO PUFFS BY MOUTH EVERY 6 HOURS AS NEEDED FOR WHEEZING OR FOR SHORTNESS OF BREATH 4/13/21   José Luis Gannon MD   amitriptyline (ELAVIL) 50 MG tablet Take 2 po qhs  Patient taking differently: Take 50 mg by mouth nightly Take 1 po qhs 4/9/21   Toy Talley MD   clobetasol (TEMOVATE) 0.05 % ointment Apply topically 2 times daily for psoriasis 1/27/21   José Luis Gannon MD   blood glucose monitor strips Test 3 times a day & as needed for symptoms of irregular blood glucose. Dispense sufficient amount for indicated testing frequency plus additional to accommodate PRN testing needs.  One touch Ultra blue 9/30/20   Deepika Diallo MD   Lancets MISC Use to check blood sugar three times daily along with when necessary due to symptoms. 9/30/20   Jovanny Gaines MD   Oxygen Tubing MISC by Does not apply route DX COPD. chronic respiratory failure 3/26/20   David Whalen MD   Respiratory Therapy Supplies (NEBULIZER/TUBING/MOUTHPIECE) KIT Dx COPD needs nebulizer supplies 2/20/20   David Whalen MD   ONE TOUCH ULTRASOFT LANCETS [address] [facility] Patient to test blood sugar up to 4 times daily. 11/7/19   Jovanny Gaines MD   Lancet Devices (LANCING DEVICE) MISC Provide patient with lancing device appropriate for his machine/lancing needles. 6/4/19   David Whalen MD   Handicap Placard MISC by Does not apply route Can't walk greater than 200 feet. Expires in 5 years. 2/13/19   Paige Bryant MD   fluticasone (CUTIVATE) 0.05 % cream Apply topically 2 times daily  4/19/17   Historical Provider, MD   fluticasone (FLONASE) 50 MCG/ACT nasal spray 2 sprays by Nasal route daily  Patient taking differently: 2 sprays by Nasal route daily as needed (sinus symptoms)  1/16/17   David Whalen MD        10/01/21 1124   RT Protocol   History Pulmonary Disease 1   Respiratory pattern 0   Breath sounds 2   Cough 0   Indications for Bronchodilator Therapy Decreased or absent breath sounds   Bronchodilator Assessment Score 3           HR - 80  RR - 18  SpO2 - 94%, 4L NC)  Breath Sounds: Diminished t/o.       Bronchodilator assessment at level  2  Hyperinflation assessment at level 0  Secretion Management assessment at level  0    [x]    Bronchodilator Assessment  BRONCHODILATOR ASSESSMENT SCORE  Score 0 1 2 3 4 5   Breath Sounds   []  Patient Baseline []  No Wheeze good aeration [x]  Faint, scattered wheezing, good aeration []  Expiratory Wheezing and or moderately diminished []  Insp/Exp wheeze and/or very diminished []  Insp/Exp and/ or marked distress   Respiratory Rate   []  Patient Baseline []  Less than 20 [x]  Less than 20 []  20-25 []  Greater than 25 []  Greater than 25   Peak flow % of Pred or PB [x]  NA   []  Greater than 90%  []  81-90% []  71-80% []  Less than or equal to 70%  or unable to perform []  Unable due to Respiratory Distress   Dyspnea re []  Patient Baseline []  No SOB []  No SOB [x]  SOB on exertion []  SOB min activity []  At rest/acute   e FEV% Predicted       [x]  NA []  Above 69%  []  Unable []  Above 60-69%  []  Unable []  Above 50-59%  []  Unable []  Above 35-49%  []  Unable []  Less than 35%  []  Unable                 [x]  Hyperinflation Assessment  Score 1 2 3   CXR and Breath Sounds   [x]  Clear []  No atelectasis  Basilar aeration []  Atelectasis or absent basilar breath sounds   Incentive Spirometry Volume  (Per IBW)   [x]  Greater than or equal to 15ml/Kg []  less than 15ml/Kg []  less than 15ml/Kg   Surgery within last 2 weeks [x]  None or general   []  Abdominal or thoracic surgery  []  Abdominal or thoracic   Chronic Pulmonary Historyre []  No [x]  Yes []  Yes     [x]  Secretion Management Assessment  Score 1 2 3   Bilateral Breath Sounds   []  Occasional Rhonchi []  Scattered Rhonchi []  Course Rhonchi and/or poor aeration   Sputum    []  Small amount of thin secretions []  Moderate amount of viscous secretions []  Copius, Viscious Yellow/ Secretions   CXR as reported by physician [x]  clear  []  Unavailable []  Infiltrates and/or consolidation  []  Unavailable []  Mucus Plugging and or lobar consolidation  []  Unavailable   Cough [x]  Strong, productive cough []  Weak productive cough []  No cough or weak non-productive cough   Williams Hood RCP  11:25 AM

## 2021-10-02 ENCOUNTER — APPOINTMENT (OUTPATIENT)
Dept: NUCLEAR MEDICINE | Age: 56
DRG: 280 | End: 2021-10-02
Payer: COMMERCIAL

## 2021-10-02 LAB
ANION GAP SERPL CALCULATED.3IONS-SCNC: 16 MMOL/L (ref 9–17)
ANTI-XA UNFRAC HEPARIN: 0.21 IU/L (ref 0.3–0.7)
ANTI-XA UNFRAC HEPARIN: 0.41 IU/L (ref 0.3–0.7)
BUN BLDV-MCNC: 82 MG/DL (ref 6–20)
BUN/CREAT BLD: ABNORMAL (ref 9–20)
CALCIUM SERPL-MCNC: 9 MG/DL (ref 8.6–10.4)
CHLORIDE BLD-SCNC: 96 MMOL/L (ref 98–107)
CO2: 24 MMOL/L (ref 20–31)
CREAT SERPL-MCNC: 2.28 MG/DL (ref 0.7–1.2)
FERRITIN: 669 UG/L (ref 30–400)
GFR AFRICAN AMERICAN: 36 ML/MIN
GFR NON-AFRICAN AMERICAN: 30 ML/MIN
GFR SERPL CREATININE-BSD FRML MDRD: ABNORMAL ML/MIN/{1.73_M2}
GFR SERPL CREATININE-BSD FRML MDRD: ABNORMAL ML/MIN/{1.73_M2}
GLUCOSE BLD-MCNC: 185 MG/DL (ref 75–110)
GLUCOSE BLD-MCNC: 209 MG/DL (ref 75–110)
GLUCOSE BLD-MCNC: 339 MG/DL (ref 75–110)
GLUCOSE BLD-MCNC: 345 MG/DL (ref 75–110)
GLUCOSE BLD-MCNC: 407 MG/DL (ref 75–110)
GLUCOSE BLD-MCNC: 451 MG/DL (ref 75–110)
GLUCOSE BLD-MCNC: 452 MG/DL (ref 70–99)
HCT VFR BLD CALC: 24.6 % (ref 41–53)
HEMOGLOBIN: 7.9 G/DL (ref 13.5–17.5)
IRON SATURATION: 27 % (ref 20–55)
IRON: 72 UG/DL (ref 59–158)
MCH RBC QN AUTO: 27.1 PG (ref 26–34)
MCHC RBC AUTO-ENTMCNC: 32.3 G/DL (ref 31–37)
MCV RBC AUTO: 83.9 FL (ref 80–100)
NRBC AUTOMATED: ABNORMAL PER 100 WBC
PDW BLD-RTO: 17.9 % (ref 11.5–14.9)
PLATELET # BLD: 189 K/UL (ref 150–450)
PMV BLD AUTO: 8.3 FL (ref 6–12)
POTASSIUM SERPL-SCNC: 5 MMOL/L (ref 3.7–5.3)
RBC # BLD: 2.93 M/UL (ref 4.5–5.9)
SODIUM BLD-SCNC: 136 MMOL/L (ref 135–144)
TOTAL IRON BINDING CAPACITY: 266 UG/DL (ref 250–450)
UNSATURATED IRON BINDING CAPACITY: 194 UG/DL (ref 112–347)
WBC # BLD: 8.8 K/UL (ref 3.5–11)

## 2021-10-02 PROCEDURE — 94761 N-INVAS EAR/PLS OXIMETRY MLT: CPT

## 2021-10-02 PROCEDURE — 2580000003 HC RX 258

## 2021-10-02 PROCEDURE — 6360000002 HC RX W HCPCS: Performed by: STUDENT IN AN ORGANIZED HEALTH CARE EDUCATION/TRAINING PROGRAM

## 2021-10-02 PROCEDURE — 6370000000 HC RX 637 (ALT 250 FOR IP): Performed by: NURSE PRACTITIONER

## 2021-10-02 PROCEDURE — 99233 SBSQ HOSP IP/OBS HIGH 50: CPT | Performed by: INTERNAL MEDICINE

## 2021-10-02 PROCEDURE — 6360000002 HC RX W HCPCS: Performed by: INTERNAL MEDICINE

## 2021-10-02 PROCEDURE — 82728 ASSAY OF FERRITIN: CPT

## 2021-10-02 PROCEDURE — 3430000000 HC RX DIAGNOSTIC RADIOPHARMACEUTICAL

## 2021-10-02 PROCEDURE — 82947 ASSAY GLUCOSE BLOOD QUANT: CPT

## 2021-10-02 PROCEDURE — 2580000003 HC RX 258: Performed by: NURSE PRACTITIONER

## 2021-10-02 PROCEDURE — 2060000000 HC ICU INTERMEDIATE R&B

## 2021-10-02 PROCEDURE — 83540 ASSAY OF IRON: CPT

## 2021-10-02 PROCEDURE — 36415 COLL VENOUS BLD VENIPUNCTURE: CPT

## 2021-10-02 PROCEDURE — 94660 CPAP INITIATION&MGMT: CPT

## 2021-10-02 PROCEDURE — 6370000000 HC RX 637 (ALT 250 FOR IP): Performed by: STUDENT IN AN ORGANIZED HEALTH CARE EDUCATION/TRAINING PROGRAM

## 2021-10-02 PROCEDURE — 80048 BASIC METABOLIC PNL TOTAL CA: CPT

## 2021-10-02 PROCEDURE — 78580 LUNG PERFUSION IMAGING: CPT

## 2021-10-02 PROCEDURE — 2700000000 HC OXYGEN THERAPY PER DAY

## 2021-10-02 PROCEDURE — 85520 HEPARIN ASSAY: CPT

## 2021-10-02 PROCEDURE — 94640 AIRWAY INHALATION TREATMENT: CPT

## 2021-10-02 PROCEDURE — A9540 TC99M MAA: HCPCS

## 2021-10-02 PROCEDURE — 6360000002 HC RX W HCPCS: Performed by: NURSE PRACTITIONER

## 2021-10-02 PROCEDURE — 83550 IRON BINDING TEST: CPT

## 2021-10-02 PROCEDURE — 6360000002 HC RX W HCPCS

## 2021-10-02 PROCEDURE — 85027 COMPLETE CBC AUTOMATED: CPT

## 2021-10-02 PROCEDURE — 93970 EXTREMITY STUDY: CPT

## 2021-10-02 RX ORDER — METHYLPREDNISOLONE SODIUM SUCCINATE 40 MG/ML
40 INJECTION, POWDER, LYOPHILIZED, FOR SOLUTION INTRAMUSCULAR; INTRAVENOUS EVERY 12 HOURS
Status: DISCONTINUED | OUTPATIENT
Start: 2021-10-02 | End: 2021-10-02

## 2021-10-02 RX ORDER — SODIUM CHLORIDE 0.9 % (FLUSH) 0.9 %
10 SYRINGE (ML) INJECTION PRN
Status: DISCONTINUED | OUTPATIENT
Start: 2021-10-02 | End: 2021-10-04 | Stop reason: HOSPADM

## 2021-10-02 RX ORDER — METHYLPREDNISOLONE SODIUM SUCCINATE 40 MG/ML
30 INJECTION, POWDER, LYOPHILIZED, FOR SOLUTION INTRAMUSCULAR; INTRAVENOUS EVERY 12 HOURS
Status: DISCONTINUED | OUTPATIENT
Start: 2021-10-02 | End: 2021-10-04 | Stop reason: HOSPADM

## 2021-10-02 RX ADMIN — METHYLPREDNISOLONE SODIUM SUCCINATE 30 MG: 40 INJECTION, POWDER, FOR SOLUTION INTRAMUSCULAR; INTRAVENOUS at 12:16

## 2021-10-02 RX ADMIN — IPRATROPIUM BROMIDE AND ALBUTEROL SULFATE 1 AMPULE: .5; 3 SOLUTION RESPIRATORY (INHALATION) at 19:54

## 2021-10-02 RX ADMIN — DOCUSATE SODIUM 100 MG: 100 CAPSULE, LIQUID FILLED ORAL at 20:10

## 2021-10-02 RX ADMIN — IPRATROPIUM BROMIDE AND ALBUTEROL SULFATE 1 AMPULE: .5; 3 SOLUTION RESPIRATORY (INHALATION) at 07:18

## 2021-10-02 RX ADMIN — OXYCODONE HYDROCHLORIDE 10 MG: 10 TABLET ORAL at 20:16

## 2021-10-02 RX ADMIN — Medication 1000 UNITS: at 08:32

## 2021-10-02 RX ADMIN — IPRATROPIUM BROMIDE AND ALBUTEROL SULFATE 1 AMPULE: .5; 3 SOLUTION RESPIRATORY (INHALATION) at 16:30

## 2021-10-02 RX ADMIN — HEPARIN SODIUM 4000 UNITS: 1000 INJECTION INTRAVENOUS; SUBCUTANEOUS at 00:44

## 2021-10-02 RX ADMIN — FUROSEMIDE 20 MG: 10 INJECTION, SOLUTION INTRAMUSCULAR; INTRAVENOUS at 18:10

## 2021-10-02 RX ADMIN — FERROUS SULFATE TAB 325 MG (65 MG ELEMENTAL FE) 325 MG: 325 (65 FE) TAB at 08:32

## 2021-10-02 RX ADMIN — METHYLPREDNISOLONE SODIUM SUCCINATE 60 MG: 125 INJECTION, POWDER, FOR SOLUTION INTRAMUSCULAR; INTRAVENOUS at 00:44

## 2021-10-02 RX ADMIN — DOCUSATE SODIUM 100 MG: 100 CAPSULE, LIQUID FILLED ORAL at 08:32

## 2021-10-02 RX ADMIN — INSULIN LISPRO 6 UNITS: 100 INJECTION, SOLUTION INTRAVENOUS; SUBCUTANEOUS at 18:09

## 2021-10-02 RX ADMIN — SODIUM CHLORIDE, PRESERVATIVE FREE 10 ML: 5 INJECTION INTRAVENOUS at 10:59

## 2021-10-02 RX ADMIN — INSULIN HUMAN 150 UNITS: 500 INJECTION, SOLUTION SUBCUTANEOUS at 08:31

## 2021-10-02 RX ADMIN — AMITRIPTYLINE HYDROCHLORIDE 50 MG: 50 TABLET, FILM COATED ORAL at 20:13

## 2021-10-02 RX ADMIN — INSULIN LISPRO 2 UNITS: 100 INJECTION, SOLUTION INTRAVENOUS; SUBCUTANEOUS at 12:16

## 2021-10-02 RX ADMIN — METHYLPREDNISOLONE SODIUM SUCCINATE 30 MG: 40 INJECTION, POWDER, FOR SOLUTION INTRAMUSCULAR; INTRAVENOUS at 23:42

## 2021-10-02 RX ADMIN — INSULIN HUMAN 150 UNITS: 500 INJECTION, SOLUTION SUBCUTANEOUS at 12:17

## 2021-10-02 RX ADMIN — LATANOPROST 1 DROP: 50 SOLUTION OPHTHALMIC at 20:13

## 2021-10-02 RX ADMIN — INSULIN LISPRO 2 UNITS: 100 INJECTION, SOLUTION INTRAVENOUS; SUBCUTANEOUS at 20:21

## 2021-10-02 RX ADMIN — INSULIN LISPRO 14 UNITS: 100 INJECTION, SOLUTION INTRAVENOUS; SUBCUTANEOUS at 05:31

## 2021-10-02 RX ADMIN — HEPARIN SODIUM 2000 UNITS: 1000 INJECTION INTRAVENOUS; SUBCUTANEOUS at 07:06

## 2021-10-02 RX ADMIN — HEPARIN SODIUM 15.3 UNITS/KG/HR: 10000 INJECTION, SOLUTION INTRAVENOUS at 11:50

## 2021-10-02 RX ADMIN — GABAPENTIN 300 MG: 300 CAPSULE ORAL at 20:09

## 2021-10-02 RX ADMIN — GUAIFENESIN 1200 MG: 600 TABLET, EXTENDED RELEASE ORAL at 08:32

## 2021-10-02 RX ADMIN — FUROSEMIDE 20 MG: 10 INJECTION, SOLUTION INTRAMUSCULAR; INTRAVENOUS at 08:30

## 2021-10-02 RX ADMIN — BUTALBITA,ACETAMINOPHEN AND CAFFEINE 1 CAPSULE: 50; 300; 40 CAPSULE ORAL at 23:45

## 2021-10-02 RX ADMIN — Medication 7.5 MILLICURIE: at 10:59

## 2021-10-02 RX ADMIN — INSULIN HUMAN 150 UNITS: 500 INJECTION, SOLUTION SUBCUTANEOUS at 18:09

## 2021-10-02 RX ADMIN — CLOPIDOGREL BISULFATE 75 MG: 75 TABLET ORAL at 08:33

## 2021-10-02 RX ADMIN — HEPARIN SODIUM 13.3 UNITS/KG/HR: 10000 INJECTION, SOLUTION INTRAVENOUS at 05:22

## 2021-10-02 RX ADMIN — ALLOPURINOL 100 MG: 100 TABLET ORAL at 08:33

## 2021-10-02 RX ADMIN — INSULIN LISPRO 18 UNITS: 100 INJECTION, SOLUTION INTRAVENOUS; SUBCUTANEOUS at 08:30

## 2021-10-02 RX ADMIN — PANTOPRAZOLE SODIUM 40 MG: 40 TABLET, DELAYED RELEASE ORAL at 05:32

## 2021-10-02 RX ADMIN — OXYCODONE HYDROCHLORIDE 10 MG: 10 TABLET ORAL at 04:51

## 2021-10-02 RX ADMIN — ESCITALOPRAM OXALATE 10 MG: 10 TABLET ORAL at 08:32

## 2021-10-02 RX ADMIN — ANTI-FUNGAL POWDER MICONAZOLE NITRATE TALC FREE: 1.42 POWDER TOPICAL at 08:34

## 2021-10-02 RX ADMIN — ALBUTEROL SULFATE 2.5 MG: 2.5 SOLUTION RESPIRATORY (INHALATION) at 12:04

## 2021-10-02 RX ADMIN — SODIUM CHLORIDE, PRESERVATIVE FREE 10 ML: 5 INJECTION INTRAVENOUS at 20:19

## 2021-10-02 RX ADMIN — ROSUVASTATIN CALCIUM 10 MG: 10 TABLET, FILM COATED ORAL at 20:14

## 2021-10-02 RX ADMIN — SODIUM CHLORIDE, PRESERVATIVE FREE 10 ML: 5 INJECTION INTRAVENOUS at 08:34

## 2021-10-02 RX ADMIN — ASPIRIN 81 MG: 81 TABLET, COATED ORAL at 08:32

## 2021-10-02 RX ADMIN — Medication 500 MCG: at 08:32

## 2021-10-02 RX ADMIN — GUAIFENESIN 1200 MG: 600 TABLET, EXTENDED RELEASE ORAL at 20:10

## 2021-10-02 RX ADMIN — BUTALBITA,ACETAMINOPHEN AND CAFFEINE 1 CAPSULE: 50; 300; 40 CAPSULE ORAL at 07:14

## 2021-10-02 RX ADMIN — BUDESONIDE AND FORMOTEROL FUMARATE DIHYDRATE 2 PUFF: 160; 4.5 AEROSOL RESPIRATORY (INHALATION) at 07:29

## 2021-10-02 RX ADMIN — ANTI-FUNGAL POWDER MICONAZOLE NITRATE TALC FREE: 1.42 POWDER TOPICAL at 20:13

## 2021-10-02 RX ADMIN — VENLAFAXINE HYDROCHLORIDE 75 MG: 75 CAPSULE, EXTENDED RELEASE ORAL at 08:33

## 2021-10-02 RX ADMIN — METOPROLOL TARTRATE 50 MG: 50 TABLET, FILM COATED ORAL at 20:10

## 2021-10-02 RX ADMIN — GABAPENTIN 300 MG: 300 CAPSULE ORAL at 08:33

## 2021-10-02 RX ADMIN — HEPARIN SODIUM 13.3 UNITS/KG/HR: 10000 INJECTION, SOLUTION INTRAVENOUS at 01:02

## 2021-10-02 RX ADMIN — METOPROLOL TARTRATE 50 MG: 50 TABLET, FILM COATED ORAL at 08:32

## 2021-10-02 RX ADMIN — BUDESONIDE AND FORMOTEROL FUMARATE DIHYDRATE 2 PUFF: 160; 4.5 AEROSOL RESPIRATORY (INHALATION) at 19:55

## 2021-10-02 ASSESSMENT — ENCOUNTER SYMPTOMS
CHEST TIGHTNESS: 0
COUGH: 1
SORE THROAT: 0
VOMITING: 0
ABDOMINAL DISTENTION: 0
NAUSEA: 0
DIARRHEA: 0
SINUS PAIN: 0
CONSTIPATION: 0
RHINORRHEA: 0
ABDOMINAL PAIN: 1
SHORTNESS OF BREATH: 0
PHOTOPHOBIA: 0
BACK PAIN: 1

## 2021-10-02 ASSESSMENT — PAIN DESCRIPTION - FREQUENCY: FREQUENCY: INTERMITTENT

## 2021-10-02 ASSESSMENT — PAIN DESCRIPTION - PROGRESSION
CLINICAL_PROGRESSION: NOT CHANGED

## 2021-10-02 ASSESSMENT — PAIN DESCRIPTION - LOCATION: LOCATION: HEAD

## 2021-10-02 ASSESSMENT — PAIN SCALES - GENERAL
PAINLEVEL_OUTOF10: 8
PAINLEVEL_OUTOF10: 1
PAINLEVEL_OUTOF10: 8
PAINLEVEL_OUTOF10: 9
PAINLEVEL_OUTOF10: 6

## 2021-10-02 ASSESSMENT — PAIN DESCRIPTION - DESCRIPTORS: DESCRIPTORS: ACHING

## 2021-10-02 ASSESSMENT — PAIN DESCRIPTION - PAIN TYPE: TYPE: CHRONIC PAIN

## 2021-10-02 ASSESSMENT — PAIN DESCRIPTION - ORIENTATION: ORIENTATION: OTHER (COMMENT)

## 2021-10-02 NOTE — CONSULTS
[address]                 [address] [facility], [address]                                  CONSULTATION    PATIENT NAME: Chandler Rae                    :        1965  MED REC NO:   [id number]                              ROOM:       2012  ACCOUNT NO:   [de-identified]                           ADMIT DATE: 2021  PROVIDER:     Ross Tyson    CONSULT DATE:  10/01/2021    REASON FOR CONSULTATION:  Worsening shortness of breath, history of  chronic respiratory failure, history of COPD, active tobacco use and  sleep apnea. HISTORY OF PRESENT ILLNESS:  The patient is a 48-year-old male. The  patient has not seen a pulmonologist for years. The patient has a  diagnosis of COPD. He is on Spiriva and albuterol. He still smokes up  to two packs a day. His last cigarette was a few days ago. He does have a history of morbid obesity, history of sleep apnea. He is  on a CPAP with 3 L of oxygen bleed in. He normally is on 3 L during the  daytime. He does not know what his pressures are. He has told me that  he gets his mask and supplies routinely through Dr. Garcia [facility],  his family doctor. His family doctor also supplies his breathing  treatments and COPD medications. The patient presents because of two-day history of progressive worsening  shortness of breath. He was requiring 4 to 5 L nasal cannula because of  shortness of breath. He denied any fever. No chills. No sputum  production. No hemoptysis. The patient's symptoms worsened, got more  severe, where he presented himself to the Emergency Department. In the  ER, chest x-ray revealed cardiomegaly and some congestive changes. Labs  revealed a BUN and creatinine of 66/2. 41. ProBNP 2,222. Glucose 488. White count normal at 7.0. D-dimer 1.69. Because of the patient's  kidney function and size, he could not get a CT scan per PE protocol. A  heparin drip has been started.   He Acute-upon-chronic hypoxemic respiratory failure. The patient does  not have any symptoms of hypercapnia. 2.  Acute-upon-chronic kidney disease. 3.  CHF, preserved ejection fraction. 4.  Morbid obesity with a BMI of 56.  5.  COPD, clinically severe. 6.  Tobacco abuse, two packs per day. PLAN:  1. Continue DuoNeb treatments for now. 2.  I was told PICC line has been placed. 3.  The patient cannot receive a CT scan for PE protocol as the patient  has elevated creatinine. We will check Doppler ultrasound of the lower  extremities. 4.  Heparin drip per primary service. 5.  The patient currently does not appear to be actively seizing this  time. We will decrease the Solu-Medrol to 40 mg every 12 hours. We will follow the patient in-house and thank you Dr. Earl Cuellar for the  consult.         Deja Akhtar    D: 10/01/2021 14:58:14       T: 10/01/2021 20:34:52     [email or web address]  Job#: [id number]     Doc#: [id number]    CC:

## 2021-10-02 NOTE — PROGRESS NOTES
Returned from [facility]. Patient tolerated well. RN went with patient.  Patient transported via W/C on monitor

## 2021-10-02 NOTE — PROGRESS NOTES
Patient transferred and is in room. Patient sitting at the side of bed. Cardiac monitor is on. Bed in lowest position and locked.

## 2021-10-02 NOTE — PROGRESS NOTES
Department of Internal Medicine  Nephrology Luis Alfredo Long MD   Progress Note    Reason for consultation: Management of acute kidney injury and chronic kidney disease stage III. Consulting physician: Antelmo Domingo MD    Interval history: Patient was seen and examined today and reports having bouts of coughing overnight. He has mild dyspnea but no chest pain. He remains on heparin drip and is scheduled to undergo a VQ scan this morning. History of present illness: This is a 63 y.o. male with a significant past medical history of coronary artery disease, heart failure with preserved ejection fraction [HFrEF with LVEF 55%], chronic pancreatitis, degenerative disc disease, obstructive sleep apnea chronic kidney disease stage III [baseline serum creatinine 1.4 to 1.5 mg/dL], who presented to the emergency department for further evaluation of worsening shortness of breath. He had been recently admitted to [place] for management of unstable angina and discharged on 9/14/2021. He underwent cardiac catheterization at [place] on 9/11/2021 with finding of nonobstructive coronary artery disease. Laboratory studies at presentation on 9/30/2021 were remarkable for serum potassium 5.3 mmol/L and BUN/creatinine 66/2.41 mg/dL and hence nephrology consultation. X-ray today showed cardiomegaly, pulmonary vascular congestion and trace bilateral pleural effusions. He has received a total of 60 mg of IV Lasix since admission and is on heparin drip. Home medications included Bumex 1 mg p.o. 3 times daily and spironolactone 50 mg p.o. twice a week. He states that he has lost 31 pounds by diet in the last 8 months.     Scheduled Meds:   methylPREDNISolone  40 mg IntraVENous Q12H    furosemide  20 mg IntraVENous BID    nicotine  1 patch TransDERmal Daily    insulin lispro  0-18 Units SubCUTAneous TID     insulin lispro  0-9 Units SubCUTAneous Nightly    insulin regular human  150 Units SubCUTAneous TID WC    allopurinol  100 mg Oral Daily    amitriptyline  50 mg Oral Nightly    aspirin  81 mg Oral Daily    clopidogrel  75 mg Oral Daily    docusate sodium  100 mg Oral BID    escitalopram  10 mg Oral Daily    ferrous sulfate  325 mg Oral Daily    budesonide-formoterol  2 puff Inhalation BID    gabapentin  300 mg Oral BID    latanoprost  1 drop Both Eyes Nightly    [Held by provider] lisinopril  5 mg Oral Daily    metoprolol tartrate  50 mg Oral BID    miconazole   Topical BID    pantoprazole  40 mg Oral QAM AC    rosuvastatin  10 mg Oral Nightly    [Held by provider] spironolactone  50 mg Oral Once per day on Mon Thu    venlafaxine  75 mg Oral Daily    vitamin B-12  500 mcg Oral Daily    Vitamin D  1,000 Units Oral Daily    sodium chloride flush  5-40 mL IntraVENous 2 times per day    ipratropium-albuterol  1 ampule Inhalation Q4H WA    guaiFENesin  1,200 mg Oral BID     Continuous Infusions:   dextrose      heparin (PORCINE) Infusion 15.3 Units/kg/hr (10/02/21 0705)    sodium chloride       PRN Meds:.technetium albumin aggregated, sodium chloride flush, sodium chloride flush, glucose, dextrose, glucagon (rDNA), dextrose, heparin (porcine), heparin (porcine), butalbital-APAP-caffeine, melatonin, nitroGLYCERIN, oxyCODONE HCl, tiZANidine, sodium chloride flush, sodium chloride, ondansetron **OR** ondansetron, polyethylene glycol, acetaminophen **OR** acetaminophen, albuterol    Physical Exam:    VITALS:  BP (!) 151/79   Pulse 65   Temp 98.1 °F (36.7 °C) (Oral)   Resp 18   Ht 6' (1.829 m)   Wt (!) 412 lb (186.9 kg)   SpO2 96%   BMI 55.88 kg/m²   24HR INTAKE/OUTPUT:      Intake/Output Summary (Last 24 hours) at 10/2/2021 1009  Last data filed at 10/2/2021 3049  Gross per 24 hour   Intake 360 ml   Output 1500 ml   Net -1140 ml     Constitutional: alert, appears stated age and cooperative    Skin: Skin color, texture, turgor normal. No rashes or lesions    Head: Normocephalic, without obvious abnormality, atraumatic     Cardiovascular/Edema: regular rate and rhythm, S1, S2 normal, no murmur, click, rub or gallop    Respiratory: diminished breath sounds bilaterally    Abdomen: soft, non-tender; bowel sounds normal; no masses,  no organomegaly    Back: symmetric, no curvature. ROM normal. No CVA tenderness. Extremities: edema Trace to 1+ bilateral pedal edema which patient states is improved with chronic stasis dermatitis    Neuro:  Grossly normal    CBC:   Recent Labs     09/30/21  1947 10/01/21  0447 10/02/21  0629   WBC 7.0 9.2 8.8   HGB 7.8* 8.3* 7.9*    175 189     BMP:    Recent Labs     09/30/21  1947 09/30/21  1947 10/01/21  0447 10/01/21  1757 10/02/21  0629     --  133*  --  136   K 5.3   < > 5.9* 5.8* 5.0   CL 99  --  94*  --  96*   CO2 23  --  23  --  24   BUN 66*  --  65*  --  82*   CREATININE 2.41*  --  2.36*  --  2.28*   GLUCOSE 205*  --  319*  --  452*    < > = values in this interval not displayed.      Lab Results   Component Value Date    NITRU NEGATIVE 10/01/2021    COLORU Dark Yellow 10/01/2021    PHUR 5.5 10/01/2021    WBCUA None 02/17/2016    RBCUA 0 TO 2 02/17/2016    MUCUS NOT REPORTED 02/17/2016    TRICHOMONAS NOT REPORTED 02/17/2016    YEAST NOT REPORTED 02/17/2016    BACTERIA NOT REPORTED 02/17/2016    CLARITYU Clear 10/31/2018    SPECGRAV 1.025 10/01/2021    LEUKOCYTESUR NEGATIVE 10/01/2021    UROBILINOGEN Normal 10/01/2021    BILIRUBINUR NEGATIVE 10/01/2021    BILIRUBINUR neg 06/30/2016    BLOODU Negative 10/31/2018    GLUCOSEU NEGATIVE 10/01/2021    KETUA NEGATIVE 10/01/2021    AMORPHOUS NOT REPORTED 02/17/2016     Urine Sodium:     Lab Results   Component Value Date    MARIA L 49 10/01/2021     Urine Potassium:    Lab Results   Component Value Date    KUR 53.9 10/01/2021     Urine Chloride:    Lab Results   Component Value Date    CLUR 45 10/01/2021     Urine Osmolarity:   Lab Results   Component Value Date    OSMOU 363 02/17/2016 Urine Protein:     Lab Results   Component Value Date    TPU <4 09/12/2021     Urine Creatinine:     Lab Results   Component Value Date    LABCREA 38.5 01/20/2021     IMPRESSION/RECOMMENDATIONS:      1. Acute kidney injury superimposed on chronic kidney disease stage III - most consistent with prerenal azotemia from diuretic therapy. However, given clinical and radiologic evidence of pulmonary edema, we will continue diuretics. Renal function is stable. Plan: Continue IV Lasix 20 mg twice daily. Monitor urine output closely. Basic metabolic profile daily. 2.  Hyperkalemia - likely secondary to diminished GFR and use of potassium sparing diuretic, spironolactone. Serum potassium is now within normal range with medical therapy. 3.  Systemic hypertension - Continue current medications    4. Hyponatremia- Secondary to fluid translocation consequent to hyperglycemia. 5.  Normocytic anemia likely secondary to chronic disease and chronic kidney disease. We will check iron stores. Prognosis is guarded.     Blaise Mckenna MD FACP  Attending Nephrologist  10/2/2021 10:09 AM

## 2021-10-02 NOTE — PLAN OF CARE
Problem: Pain:  Goal: Pain level will decrease  Description: Pain level will decrease  Outcome: Ongoing  Goal: Control of acute pain  Description: Control of acute pain  Outcome: Ongoing  Goal: Control of chronic pain  Description: Control of chronic pain  Outcome: Ongoing     Problem: Falls - Risk of:  Goal: Will remain free from falls  Description: Will remain free from falls  Outcome: Ongoing  Goal: Absence of physical injury  Description: Absence of physical injury  Outcome: Ongoing     Problem: Skin Integrity:  Goal: Will show no infection signs and symptoms  Description: Will show no infection signs and symptoms  Outcome: Ongoing  Goal: Absence of new skin breakdown  Description: Absence of new skin breakdown  Outcome: Ongoing  Goal: Risk for impaired skin integrity will decrease  Description: Risk for impaired skin integrity will decrease  Outcome: Ongoing

## 2021-10-02 NOTE — PROGRESS NOTES
[address] [facility].    PROGRESS NOTE             10/2/2021    9:38 AM    Name:   Chastity Perez. MRN:     [id number]     Acct:      [de-identified]   Room:   2012/2012-01  IP Day:  2  Admit Date:  9/30/2021  6:08 PM    PCP:  Jessica Meyer MD  Code Status:  DNR-CCA    Subjective:     C/C:   Chief Complaint   Patient presents with    Nasal Congestion    Cough    Rib Pain    Shortness of Breath     Interval History Status: not changed. Patient is examined at bedside. Reports he wasn't able to sleep and had coughing fits all night. Reports he is comfortable now. Reports persistent abdominal pain that has been present for weeks and new onset black tarry stools. Satting 95% on 2L O2, below his home dose. Brief History:     Patient is a 62 yo male with history of DM2, anemia secondary to CKD, HTN, CAD s/p stent, HTN, pancreatitis, BARBY, hydroceles, gout, presented to ED with 2 days of SOB and chest pain. PCP directed him to go to ED due to headache, chest and ribs sore from coughing, dyspnea, and fatigue. She wanted him to call 911 but patient refused due to not wanting to go back to [facility], the closest hospital for him. PCP called his daughter who brought him to ED. Patient is vaccinated 2x with pfizer. Patient is a longtime smoker that quit 10 months ago. He started smoking 2 pack a day again 1 month ago. He is interested and motivated to quit. On 3L/min home O2. Review of Systems:     Review of Systems   Constitutional: Positive for diaphoresis (whole body sweats overnight with cough, not currently). Negative for appetite change, chills, fatigue and fever. HENT: Negative for congestion, rhinorrhea, sinus pain and sore throat. Eyes: Negative for photophobia and visual disturbance. Respiratory: Positive for cough (coughing spell overnight and with lying). Negative for chest tightness and shortness of breath. Cardiovascular: Positive for leg swelling (chronic). Negative for chest pain. Gastrointestinal: Positive for abdominal pain (chronic). Negative for abdominal distention, constipation, diarrhea, nausea and vomiting. Endocrine: Positive for polydipsia (\"always drank a lot of liquids\" 10 bottles of water a day) and polyuria. Genitourinary: Positive for frequency and scrotal swelling (7 years, seen by uro). Negative for difficulty urinating and dysuria. Musculoskeletal: Positive for back pain (since 89, s/p back surgery) and gait problem. Skin: Positive for rash (b/l LE). Neurological: Positive for dizziness (w/ walking) and headaches. Psychiatric/Behavioral: Negative for agitation, behavioral problems and confusion. Medications: Allergies: Allergies   Allergen Reactions    Levofloxacin Anaphylaxis     Patient reports needing epinephrine \"about 5 or 6 months ago\" for anaphylaxis (itching, hives, SOB/swelling) after receiving Levofloxacin. Previous report from 08/20/2012: Nausea/Vomiting    Lorazepam      Falls      Nsaids      CHF&CKD    Prozac [Fluoxetine Hcl] Other (See Comments)     Pt started with seizures after started taking.  Vancomycin Other (See Comments)     Itching, SOB, emesis upon infusion in ED 6/12/2019. Patient states he has had vancomycin \"a number of times\" before without issue. couldn't breath and talk, throat closed       Current Meds:   Scheduled Meds:    methylPREDNISolone  40 mg IntraVENous Q12H    furosemide  20 mg IntraVENous BID    nicotine  1 patch TransDERmal Daily    insulin lispro  0-18 Units SubCUTAneous TID     insulin lispro  0-9 Units SubCUTAneous Nightly    insulin regular human  150 Units SubCUTAneous TID     allopurinol  100 mg Oral Daily    amitriptyline  50 mg Oral Nightly    aspirin  81 mg Oral Daily    clopidogrel  75 mg Oral Daily    docusate sodium  100 mg Oral BID    escitalopram  10 mg Oral Daily    ferrous sulfate  325 mg Oral Daily    budesonide-formoterol  2 puff Inhalation BID    gabapentin  300 mg Oral BID    latanoprost  1 drop Both Eyes Nightly    [Held by provider] lisinopril  5 mg Oral Daily    metoprolol tartrate  50 mg Oral BID    miconazole   Topical BID    pantoprazole  40 mg Oral QAM AC    rosuvastatin  10 mg Oral Nightly    [Held by provider] spironolactone  50 mg Oral Once per day on Mon Thu    venlafaxine  75 mg Oral Daily    vitamin B-12  500 mcg Oral Daily    Vitamin D  1,000 Units Oral Daily    sodium chloride flush  5-40 mL IntraVENous 2 times per day    ipratropium-albuterol  1 ampule Inhalation Q4H WA    guaiFENesin  1,200 mg Oral BID     Continuous Infusions:    dextrose      heparin (PORCINE) Infusion 15.3 Units/kg/hr (10/02/21 0705)    sodium chloride       PRN Meds: glucose, dextrose, glucagon (rDNA), dextrose, heparin (porcine), heparin (porcine), butalbital-APAP-caffeine, melatonin, nitroGLYCERIN, oxyCODONE HCl, tiZANidine, sodium chloride flush, sodium chloride, ondansetron **OR** ondansetron, polyethylene glycol, acetaminophen **OR** acetaminophen, albuterol    Data:     Past Medical History:   has a past medical history of Acute on chronic kidney failure ([address] Acute on chronic respiratory failure (HCC), Adhesive capsulitis of left shoulder, Anxiety, Arthropathy, unspecified, other specified sites, Asthma, B12 deficiency, Bilateral lower leg cellulitis, Blood in stool, CAD (coronary artery disease), Cardiovascular stress test abnormal, Cellulitis of both lower extremities, Cellulitis of leg, left, CHF (congestive heart failure), NYHA class III ([address] Chronic back pain, Chronic bronchitis ([address] Chronic headaches, Chronic respiratory failure ([address] Chronic ulcer of left leg, with fat layer exposed ([address] Class 2 severe obesity due to excess calories with serious comorbidity and body mass index (BMI) of 35.0 to 35.9 in [facility]), COPD exacerbation ([address] Diabetic neuropathy ([address] Displacement of lumbar intervertebral disc without myelopathy, Ear infection, Essential hypertension, Facial cellulitis, Fall, GERD (gastroesophageal reflux disease), Head injury, Hearing loss in right ear, Hepatic steatosis, History of general anesthesia complication, History of rib fracture, Hyperlipidemia, Hypersomnia, Hypertension, Insomnia, Intolerance of continuous positive airway pressure (CPAP) ventilation, Iron deficiency, Localized rash, Magnesium deficiency, Mastoiditis of left side, Mixed conductive and sensorineural hearing loss of both ears, Mixed type COPD (chronic obstructive pulmonary disease) ([facility]), Moderate recurrent major depression ([address] Morbid obesity with BMI of 45.0-49.9, adult ([address] On home oxygen therapy, Open wound of groin, BARBY on CPAP, Osteoarthritis, Otitis externa of left ear, Pancreatitis chronic, Persistent depressive disorder, Renal insufficiency, Severe depression ([address] Spinal stenosis of lumbar region without neurogenic claudication, Syncope, Tinnitus of both ears, Type 2 diabetes mellitus with stage 3 chronic kidney disease, with long-term current use of insulin ([facility]), Type II or unspecified type diabetes mellitus without mention of complication, not stated as uncontrolled, Vitamin D deficiency, and Wears glasses. Social History:   reports that he quit smoking about 11 months ago. His smoking use included cigarettes. He started smoking about 36 years ago. He has a 8.25 pack-year smoking history. He quit smokeless tobacco use about 26 years ago. His smokeless tobacco use included snuff. He reports previous drug use. Drug: Marijuana. He reports that he does not drink alcohol.      Family History:   Family History   Problem Relation Age of Onset    Heart Disease Mother          age 64 from [facility] High Blood Pressure Mother     Diabetes Mother     High Blood Pressure Father          age 80 from CKD and Lung Fibrosis    Kidney Disease Father     Heart CONTRAST    Result Date: 9/11/2021  EXAMINATION: CT OF THE CHEST WITHOUT CONTRAST 9/11/2021 1:56 am TECHNIQUE: CT of the chest was performed without the administration of intravenous contrast. Multiplanar reformatted images are provided for review. Dose modulation, iterative reconstruction, and/or weight based adjustment of the mA/kV was utilized to reduce the radiation dose to as low as reasonably achievable. COMPARISON: CT PA dated 03/27/2018. HISTORY: ORDERING SYSTEM PROVIDED HISTORY: high risk chest pain, aortic dissection? TECHNOLOGIST PROVIDED HISTORY: high risk chest pain, aortic dissection? Reason for Exam: chest pain FINDINGS: Mediastinum:  No evidence of mediastinal, hilar or axillary lymphadenopathy. Aorta is normal in caliber without acute abnormality although evaluation is limited without IV contrast.  Aortic caliber is unchanged compared to the chest CTA of 03/27/2018. The central airways are clear. Lungs/pleura: Aside from minor bibasilar atelectasis, the lungs are clear. No pneumothorax or pleural effusion. Upper Abdomen:  Limited images of the upper abdomen demonstrate no acute abnormality. Soft Tissues/Bones: Surrounding osseous and soft tissue structures are unremarkable. The IV was lost and contrast could not be given. Dissection therefore cannot be excluded. This noncontrast exam is normal for age. Findings discussed with the ordering physician 09/11/2021 at 0321 hours. US RENAL COMPLETE    Result Date: 9/12/2021  EXAMINATION: RETROPERITONEAL ULTRASOUND OF THE KIDNEYS AND URINARY BLADDER 9/12/2021 COMPARISON: CT abdomen pelvis from 12/03/2015 HISTORY: ORDERING SYSTEM PROVIDED HISTORY: lakisha TECHNOLOGIST PROVIDED HISTORY: lakisha 43-year-old male with acute kidney injury FINDINGS: Kidneys: Exam is limited due to patient's body habitus. Right kidney measures 12.5 x 6.7 x 6.8 cm. Right renal cortical thickness measures 1.3 cm. The left kidney was difficult to properly visualize.  No right-sided hydronephrosis or perinephric fluid. Gross preservation of the right-sided corticomedullary differentiation. Bladder: Urinary bladder is empty. 1. Limited visualization of the right kidney grossly unremarkable. No right-sided hydronephrosis. 2. Left kidney was difficult to properly visualize due to patient's body habitus. 3. Empty urinary bladder. XR CHEST PORTABLE    Result Date: 10/1/2021  EXAMINATION: ONE XRAY VIEW OF THE CHEST 10/1/2021 5:49 am COMPARISON: 30 September 2021 HISTORY: ORDERING SYSTEM PROVIDED HISTORY: AECOPD TECHNOLOGIST PROVIDED HISTORY: AECOPD Reason for Exam: AECOPD Acuity: Unknown Type of Exam: Unknown Additional signs and symptoms: AECOPD Relevant Medical/Surgical History: AECOPD FINDINGS: AP portable view of the chest time stamped at 537 hours demonstrates overlying cardiac monitoring electrodes, stable cardiomegaly, pulmonary vascular congestion, and bibasilar opacities favoring atelectasis or interstitial edema over infectious process. Trace effusions are noted. No extrapleural air. Osseous structures are unremarkable. Cardiomegaly. Pulmonary vascular congestion, trace effusions and bibasilar opacities favoring interstitial edema or atelectasis over infectious process. XR CHEST PORTABLE    Result Date: 9/30/2021  EXAMINATION: ONE XRAY VIEW OF THE CHEST 9/30/2021 6:53 pm COMPARISON: 09/11/2021 HISTORY: ORDERING SYSTEM PROVIDED HISTORY: shortness of breath TECHNOLOGIST PROVIDED HISTORY: shortness of breath Reason for Exam: Shortness of breath, hx CHF and copd Acuity: Acute Type of Exam: Initial Additional signs and symptoms: Shortness of breath, hx CHF and copd Relevant Medical/Surgical History: Shortness of breath, hx CHF and copd FINDINGS: Similar appearing prominence of the cardiac silhouette. Mild interstitial indistinctness. Mild bibasilar linear appearing opacifications are favored to represent atelectasis. No convincing focal consolidation.   Mild blunting the right costophrenic angle. The left costophrenic is not included in the field of view. No evidence of pneumothorax. No acute osseous abnormalities. 1. Similar appearing prominence of the cardiac silhouette. 2. Mild interstitial indistinctness may related to vascular congestion. 3. Bibasilar linear opacifications are favored to represent atelectasis. No convincing focal consolidation. XR CHEST PORTABLE    Result Date: 9/11/2021  EXAMINATION: ONE XRAY VIEW OF THE CHEST 9/11/2021 12:57 am COMPARISON: 03/28/2019 HISTORY: ORDERING SYSTEM PROVIDED HISTORY: chest pain TECHNOLOGIST PROVIDED HISTORY: chest pain Reason for Exam: upright FINDINGS: Portable study is limited by obesity. Mild right basilar opacity. Lungs are otherwise grossly clear. No pneumothorax or pleural fluid. Heart size is within normal limits. No acute bone finding. Right basilar opacity may represent airspace disease or superimposed shadows on this portable study obtained on an obese patient. When the patient is able, a follow-up PA and lateral examination of the chest would be helpful. US SCROTUM W LIMITED DUPLEX    Result Date: 9/3/2021  EXAMINATION: ULTRASOUND OF THE SCROTUM/TESTICLES WITH COLOR DOPPLER FLOW EVALUATION 9/3/2021 COMPARISON: None. HISTORY: ORDERING SYSTEM PROVIDED HISTORY: Edema of scrotum TECHNOLOGIST PROVIDED HISTORY: This procedure can be scheduled via [employer]. Access your [employer] account by visiting [email or web address]. FINDINGS: Measurements: Right testicle: 5.0 x 2.1 x 3.0 cm Left testicle: 4.8 x 2.4 x 3.0 cm Right: Grey scale: The right testicle demonstrates unremarkable homogeneous echotexture without focal lesion. No evidence of testicular microlithiasis. Doppler Evaluation:  There is normal arterial and venous Doppler flow within the testicle. Scrotal Sac: Scrotal swelling. Large hydrocele with low-level internal echoes. Epididymis:  Nonvisualized. Left: Grey scale:   The left testicle type 2 diabetes mellitus with hyperglycemia ([facility] [E11.65] 07/15/2013    Anemia of chronic renal failure, stage 3b ([facility]) [E81.94, D63.1] 05/08/2013    Type 2 diabetes mellitus with diabetic polyneuropathy, with long-term current use of insulin ([facility]) [E11.42, Z79.4]     Acute kidney injury superimposed on CKD ([facility] [N17.9, N18.9] 04/10/2013    Hypertension [I10]        Plan:        1. Acute on chronic respiratory failure 2/2 COPD vs URI               a. Patient was weaned back to home level of NC 3L/min              b. Methylprednisolone weaned to 60mg q12h              c. Albuterol nebulizer q2h prn              d. Symbicort 2 puff bid              e. Ipratropium albuterol q4h              f. CXR showed fluid overload; clinical improvement w/ steroids, Ddimer                 1.69, ordered VQ scan, pending              g. Sputum gram stain, resp culture pending               h. Pulm consulted, recs appreciated     2. Hyperglycemia 2/2 uncontrolled DM Type 2              a. Patient apparently on 500U/ ml regular insulin, 0.52ml at breakfast,                    0.52ml at lunch, 0.45ml at dinner. Called to confirm with pharmacy                        and PCP. Patient reports doses as 0.72/ 0.72/ 0.4ml. We will start                    with 150U tid              b. Glucose 488/ high, RN not comfortable giving such high dose without IV                    access, awaiting PICC team placement. c. POCT glucose check qid, hypoglycemia protocol              d. Adjust glucose dose as needed as steroids are decreased     3. LIZETH on CKD                          a. Avoid NSAIDS                          b. Trend Cr                          c. Nephrology consulted, recs appreciated                                      i. Kayexalate, lasix ordered    d. Presumed anemia 2/2 CKD; new sx of black tarry stool, consider FOBT      4.  NSTEMI              a. 9/11/21 Cath report showed stenosis 20-30% LAD, 10-20% LCX, 10-20% RCA              b. Troponin HS: 127 > 130 > 83, will trend troponin     5. BARBY              a. Has home CPAP, has not been using recently as it's not working     6. Gout                          a. Allopurinol 100mg daily     7. Tobacco cessation              a. Patient motivated to quit cigarettes again. Discussed strategies such as holding a straw instead of a cigarette. Can follow up with PCP to discuss chantix. Patient is amenable to using bupropion, which would benefit patient from a smoking and mood disorder perspective. Patient does not like nicotine patches as they don't stick to him. Belen Murphy MD  10/2/2021  9:38 AM     Attending Physician Statement  I have discussed the care of [address] [place] [address]. and I have examined the patient myselft and taken ros and hpi , including pertinent history and exam findings,  with the resident. I have reviewed the key elements of all parts of the encounter with the resident. I agree with the assessment, plan and orders as documented by the resident.   Acute on  resp failure with copd and chf  76-year-old  gentleman severe obese morbid morbid obesity BMI 55.9 weighs 412 pounds chronic respiratory failure on home oxygen active smoker admitted with increasing dyspnea for last few days elevated proBNP acute on chronic diastolic congestive heart failure with obesity hypoventilation syndrome likely high-output cardiac failure secondary to anemia recently upper and lower endoscopy was done  Also have underlying COPD with COPD exacerbation with uncontrolled diabetes with steroids IV steroids DuoNeb may need BiPAP gentle diuresis LIZETH on CKD Baseline creatinine 1.6-1.8 now presenting with 2.4  Multiple comorbid conditions with obesity anemia COPD and CHF high risk of admission mortality and morbidity requiring ICU intermediate admission  Electronically signed by Dorothy Rosa MD

## 2021-10-02 NOTE — PROGRESS NOTES
Pulmonary Progress Note  [facility]      Patient - Karyle Dy.,  Age - 63 y.o.    - 1965      Room Number -    MRN -  [id number]   Acct # - [de-identified]  Date of Admission -  2021  6:08 Mary Clayton MD  Primary Care Physician - Bora Torrez MD     SUBJECTIVE   Patient resting quietly pretty sitting up in the chair. Currently is on 2 L nasal cannula. O2 saturations 96%. OBJECTIVE   VITALS    height is 6' (1.829 m) and weight is 412 lb (186.9 kg) (abnormal). His oral temperature is 97 °F (36.1 °C). His blood pressure is 151/79 (abnormal) and his pulse is 69. His respiration is 18 and oxygen saturation is 96%. Body mass index is 55.88 kg/m². Temperature Range: Temp: 97 °F (36.1 °C) Temp  Av.8 °F (36.6 °C)  Min: 97 °F (36.1 °C)  Max: 98.2 °F (36.8 °C)  BP Range:  Systolic (57PXG), HS , Min:105 , DRM:672     Diastolic (08NMA), LWS:54, Min:47, Max:94    Pulse Range: Pulse  Av.4  Min: 65  Max: 107  Respiration Range: Resp  Av.2  Min: 12  Max: 25  Current Pulse Ox[de-identified]  SpO2: 96 %  24HR Pulse Ox Range:  SpO2  Av.2 %  Min: 89 %  Max: 100 %  Oxygen Amount and Delivery: O2 Flow Rate (L/min): 2 L/min    Wt Readings from Last 3 Encounters:   21 (!) 412 lb (186.9 kg)   21 (!) 414 lb (187.8 kg)   21 (!) 414 lb 1.6 oz (187.8 kg)       I/O (24 Hours)    Intake/Output Summary (Last 24 hours) at 10/2/2021 1038  Last data filed at 10/2/2021 0930  Gross per 24 hour   Intake 1060 ml   Output 1650 ml   Net -590 ml       EXAM     General Appearance  Awake, alert, oriented, in no acute distress  HEENT - normocephalic, atraumatic. Neck - Supple,  trachea midline   Lungs -coarse breath sounds no crackles rales or wheezing  Heart Exam:PMI normal. No lifts, heaves, or thrills. RRR.  No murmurs, clicks, gallops, or rubs  Abdomen Exam: Abdomen soft, non-tender obese extremity Exam: No signs of cyanosis.   Edema is present    MEDS      methylPREDNISolone  40 mg IntraVENous Q12H    furosemide  20 mg IntraVENous BID    nicotine  1 patch TransDERmal Daily    insulin lispro  0-18 Units SubCUTAneous TID     insulin lispro  0-9 Units SubCUTAneous Nightly    insulin regular human  150 Units SubCUTAneous TID     allopurinol  100 mg Oral Daily    amitriptyline  50 mg Oral Nightly    aspirin  81 mg Oral Daily    clopidogrel  75 mg Oral Daily    docusate sodium  100 mg Oral BID    escitalopram  10 mg Oral Daily    ferrous sulfate  325 mg Oral Daily    budesonide-formoterol  2 puff Inhalation BID    gabapentin  300 mg Oral BID    latanoprost  1 drop Both Eyes Nightly    [Held by provider] lisinopril  5 mg Oral Daily    metoprolol tartrate  50 mg Oral BID    miconazole   Topical BID    pantoprazole  40 mg Oral QAM AC    rosuvastatin  10 mg Oral Nightly    [Held by provider] spironolactone  50 mg Oral Once per day on Mon Thu    venlafaxine  75 mg Oral Daily    vitamin B-12  500 mcg Oral Daily    Vitamin D  1,000 Units Oral Daily    sodium chloride flush  5-40 mL IntraVENous 2 times per day    ipratropium-albuterol  1 ampule Inhalation Q4H WA    guaiFENesin  1,200 mg Oral BID      dextrose      heparin (PORCINE) Infusion 15.3 Units/kg/hr (10/02/21 0705)    sodium chloride       technetium albumin aggregated, sodium chloride flush, sodium chloride flush, glucose, dextrose, glucagon (rDNA), dextrose, heparin (porcine), heparin (porcine), butalbital-APAP-caffeine, melatonin, nitroGLYCERIN, oxyCODONE HCl, tiZANidine, sodium chloride flush, sodium chloride, ondansetron **OR** ondansetron, polyethylene glycol, acetaminophen **OR** acetaminophen, albuterol    LABS   CBC   Recent Labs     10/02/21  0629   WBC 8.8   HGB 7.9*   HCT 24.6*   MCV 83.9        BMP:   Lab Results   Component Value Date     10/02/2021    K 5.0 10/02/2021    CL 96 10/02/2021    CO2 24 10/02/2021 BUN 82 10/02/2021    LABALBU 3.5 09/30/2021    CREATININE 2.28 10/02/2021    CALCIUM 9.0 10/02/2021    GFRAA 36 10/02/2021    LABGLOM 30 10/02/2021     ABGs:  Lab Results   Component Value Date    PHART 7.410 08/29/2016    PO2ART 76.8 08/29/2016    PJQ3ACP 38.6 08/29/2016      Lab Results   Component Value Date    MODE NOT REPORTED 09/30/2021     Ionized Calcium:  No results found for: IONCA  Magnesium:    Lab Results   Component Value Date    MG 2.3 09/12/2021     Phosphorus:    Lab Results   Component Value Date    PHOS 3.6 09/14/2021        LIVER PROFILE   Recent Labs     09/30/21  1947 10/01/21  0447 10/01/21  1757   AST 14  --   --    ALT 23  --   --    LIPASE  --    < > 24   BILITOT 0.76  --   --    ALKPHOS 85  --   --     < > = values in this interval not displayed. INR No results for input(s): INR in the last 72 hours.   PTT   Lab Results   Component Value Date    APTT 31.6 09/30/2021         RADIOLOGY     (See actual reports for details)    ASSESSMENT/PLAN     Patient Active Problem List   Diagnosis    DDD (degenerative disc disease), lumbar    Hypertension    Otitis externa    Hypertriglyceridemia    Acute kidney injury superimposed on CKD (HCC)    Anemia of chronic renal failure, stage 3b ([address]    CAD (coronary artery disease)    Chronic obstructive pulmonary disease (HCC)    Type 2 diabetes mellitus with diabetic polyneuropathy, with long-term current use of insulin (HCC)    Chronic pancreatitis ([address]    Essential hypertension    Displacement of lumbar intervertebral disc without myelopathy    Uncontrolled type 2 diabetes mellitus with hyperglycemia (HCC)    Chronic diastolic congestive heart failure (HCC)    Insomnia    Diabetes mellitus type 2 in obese (HCC)    BPH (benign prostatic hyperplasia)    Morbid obesity with BMI of 50.0-59.9, adult (HCC)    Lower abdominal pain    Hepatic steatosis    History of rib fracture    Umbilical hernia    Adrenal gland anomaly    Spinal stenosis of lumbar region without neurogenic claudication    Chronic back pain greater than 3 months duration    Gastroesophageal reflux disease without esophagitis    Hyperlipidemia with target LDL less than 70    Lower urinary tract symptoms (LUTS)    Vitamin D deficiency    Iron deficiency anemia due to chronic blood loss    BARBY treated with BiPAP    Chronic midline low back pain with left-sided sciatica    Stasis dermatitis of both legs    Anxiety    Intertrigo axillary b/l    History of recurrent ear infection    Mixed conductive and sensorineural hearing loss of both ears    Tinnitus of both ears    Slow transit constipation    Chronic infection of both external ears    Tinea pedis of both feet    Onychomycosis    Moderate episode of recurrent major depressive disorder (HCC)    Hydrocele, bilateral    BARBY on CPAP    Xerosis cutis    Celiac artery stenosis (HCC)    Myopia    Nuclear nonsenile cataract    Presbyopia    Postlaminectomy syndrome of lumbar region    Venous insufficiency of left lower extremity    Bilateral hearing loss    Seizures ([address]    Recurrent infection of skin    Vitamin B 12 deficiency    Mobility impaired    Chronic respiratory failure with hypoxia (HCC)    Chronic infective otitis externa    Hypomagnesemia    Chronic malignant otitis externa of both ears    Psoriasis    Tinea corporis    Colon cancer screening declined    Excoriation    Lymphedema of both lower extremities    Venous insufficiency of both lower extremities    PVD (peripheral vascular disease) ([address]    Chronic gout due to renal impairment without tophus    Chest pain    Unstable angina (HCC)    Acute on chronic congestive heart failure (HCC)    Elevated troponin    LBBB (left bundle branch block)    Anemia, normocytic normochromic    Acute on chronic respiratory failure with hypoxia (HCC)    NSTEMI (non-ST elevated myocardial infarction) ([address]     IMPRESSION:  1. Acute-upon-chronic hypoxemic respiratory failure. The patient does  not have any symptoms of hypercapnia. 2.  Acute-upon-chronic kidney disease. 3.  CHF, preserved ejection fraction. 4.  Morbid obesity with a BMI of 56.  5.  COPD, clinically severe. 6.  Tobacco abuse, two packs per day. Patient underwent Dopplers this morning. I do not have the final results but the vascular technician informed me that he did not see any DVT. A pulmonary perfusion scan has been ordered for this morning. Results pending  On DuoNeb and Symbicort. Will decrease Solu-Medrol to 30 every 12 hours.             Electronically signed by Willi Fonseca MD on 10/2/2021 at 10:38 AM

## 2021-10-02 NOTE — PROGRESS NOTES
[address]   OCCUPATIONAL THERAPY MISSED TREATMENT NOTE   INPATIENT   Date: 10/2/21  Patient Name: Noel Gomes Room:   MRN: [id number]   Account #: [de-identified]    : 1965  (59 y.o.)  Gender: male                 REASON FOR MISSED TREATMENT:  Other   -   Lower Extremity Bilateral Venous Duplex ordered. Await results. Occupational therapy will continue to follow.           Obdulia Allan OT

## 2021-10-02 NOTE — PLAN OF CARE
Problem: Pain:  Goal: Pain level will decrease  Description: Pain level will decrease  10/2/2021 1447 by Gilma Jones RN  Outcome: Ongoing  Note: Headache relief today with fioricet. Patient has these headaches at home and takes this med with relief     Problem: Pain:  Goal: Control of acute pain  Description: Control of acute pain  10/2/2021 1447 by Gilma Jones RN  Outcome: Ongoing     Problem: Pain:  Goal: Control of chronic pain  Description: Control of chronic pain  10/2/2021 1447 by Gilma Jones RN  Outcome: Ongoing     Problem: Falls - Risk of:  Goal: Will remain free from falls  Description: Will remain free from falls  10/2/2021 1447 by Gilma Jones RN  Outcome: Ongoing  Note: Pt is aware of limitations     Problem: Falls - Risk of:  Goal: Absence of physical injury  Description: Absence of physical injury  10/2/2021 1447 by Gilma Jones RN  Outcome: Ongoing     Problem: Skin Integrity:  Goal: Will show no infection signs and symptoms  Description: Will show no infection signs and symptoms  10/2/2021 1447 by Gilma Jones RN  Outcome: Ongoing  Note: Now with PICC line need to keep site clean and dry     Problem: Skin Integrity:  Goal: Absence of new skin breakdown  Description: Absence of new skin breakdown  10/2/2021 1447 by Gilma Jones RN  Outcome: Ongoing     Problem: Skin Integrity:  Goal: Risk for impaired skin integrity will decrease  Description: Risk for impaired skin integrity will decrease  10/2/2021 1447 by Gilma Jones RN  Outcome: Ongoing     Problem:  Activity:  Goal: Fatigue will decrease  Description: Fatigue will decrease  10/2/2021 1447 by Gilma Jones RN  Outcome: Ongoing     Problem: Cardiac:  Goal: Hemodynamic stability will improve  Description: Hemodynamic stability will improve  10/2/2021 1447 by Gilma Jones RN  Outcome: Ongoing  Note: Patient will be stable enough to go home

## 2021-10-03 LAB
ABSOLUTE RETIC #: 0.11 M/UL (ref 0.02–0.1)
ANION GAP SERPL CALCULATED.3IONS-SCNC: 15 MMOL/L (ref 9–17)
BUN BLDV-MCNC: 88 MG/DL (ref 6–20)
BUN/CREAT BLD: ABNORMAL (ref 9–20)
CALCIUM SERPL-MCNC: 9.1 MG/DL (ref 8.6–10.4)
CHLORIDE BLD-SCNC: 99 MMOL/L (ref 98–107)
CO2: 25 MMOL/L (ref 20–31)
CREAT SERPL-MCNC: 1.97 MG/DL (ref 0.7–1.2)
EKG ATRIAL RATE: 93 BPM
EKG P AXIS: 37 DEGREES
EKG P-R INTERVAL: 146 MS
EKG Q-T INTERVAL: 398 MS
EKG QRS DURATION: 170 MS
EKG QTC CALCULATION (BAZETT): 494 MS
EKG R AXIS: 8 DEGREES
EKG T AXIS: 130 DEGREES
EKG VENTRICULAR RATE: 93 BPM
GFR AFRICAN AMERICAN: 43 ML/MIN
GFR NON-AFRICAN AMERICAN: 35 ML/MIN
GFR SERPL CREATININE-BSD FRML MDRD: ABNORMAL ML/MIN/{1.73_M2}
GFR SERPL CREATININE-BSD FRML MDRD: ABNORMAL ML/MIN/{1.73_M2}
GLUCOSE BLD-MCNC: 109 MG/DL (ref 75–110)
GLUCOSE BLD-MCNC: 123 MG/DL (ref 70–99)
GLUCOSE BLD-MCNC: 160 MG/DL (ref 75–110)
GLUCOSE BLD-MCNC: 245 MG/DL (ref 75–110)
GLUCOSE BLD-MCNC: 311 MG/DL (ref 75–110)
GLUCOSE BLD-MCNC: 51 MG/DL (ref 75–110)
GLUCOSE BLD-MCNC: 87 MG/DL (ref 75–110)
HCT VFR BLD CALC: 24.9 % (ref 41–53)
HEMOGLOBIN: 8 G/DL (ref 13.5–17.5)
IMMATURE RETIC FRACT: ABNORMAL %
LACTATE DEHYDROGENASE: 233 U/L (ref 135–225)
MCH RBC QN AUTO: 27 PG (ref 26–34)
MCHC RBC AUTO-ENTMCNC: 32.1 G/DL (ref 31–37)
MCV RBC AUTO: 84.2 FL (ref 80–100)
NRBC AUTOMATED: ABNORMAL PER 100 WBC
PDW BLD-RTO: 17.3 % (ref 11.5–14.9)
PLATELET # BLD: 205 K/UL (ref 150–450)
PMV BLD AUTO: 8.6 FL (ref 6–12)
POTASSIUM SERPL-SCNC: 4.6 MMOL/L (ref 3.7–5.3)
RBC # BLD: 2.96 M/UL (ref 4.5–5.9)
RETIC %: 3.8 % (ref 0.5–2)
RETIC HEMOGLOBIN: ABNORMAL PG (ref 28.2–35.7)
SODIUM BLD-SCNC: 139 MMOL/L (ref 135–144)
WBC # BLD: 10.2 K/UL (ref 3.5–11)

## 2021-10-03 PROCEDURE — 6370000000 HC RX 637 (ALT 250 FOR IP): Performed by: STUDENT IN AN ORGANIZED HEALTH CARE EDUCATION/TRAINING PROGRAM

## 2021-10-03 PROCEDURE — 36415 COLL VENOUS BLD VENIPUNCTURE: CPT

## 2021-10-03 PROCEDURE — 94640 AIRWAY INHALATION TREATMENT: CPT

## 2021-10-03 PROCEDURE — 6370000000 HC RX 637 (ALT 250 FOR IP): Performed by: INTERNAL MEDICINE

## 2021-10-03 PROCEDURE — 2700000000 HC OXYGEN THERAPY PER DAY

## 2021-10-03 PROCEDURE — 83615 LACTATE (LD) (LDH) ENZYME: CPT

## 2021-10-03 PROCEDURE — 6360000002 HC RX W HCPCS

## 2021-10-03 PROCEDURE — 6370000000 HC RX 637 (ALT 250 FOR IP): Performed by: NURSE PRACTITIONER

## 2021-10-03 PROCEDURE — 6360000002 HC RX W HCPCS: Performed by: INTERNAL MEDICINE

## 2021-10-03 PROCEDURE — 93010 ELECTROCARDIOGRAM REPORT: CPT | Performed by: INTERNAL MEDICINE

## 2021-10-03 PROCEDURE — 82947 ASSAY GLUCOSE BLOOD QUANT: CPT

## 2021-10-03 PROCEDURE — 2060000000 HC ICU INTERMEDIATE R&B

## 2021-10-03 PROCEDURE — 94660 CPAP INITIATION&MGMT: CPT

## 2021-10-03 PROCEDURE — 85027 COMPLETE CBC AUTOMATED: CPT

## 2021-10-03 PROCEDURE — 85045 AUTOMATED RETICULOCYTE COUNT: CPT

## 2021-10-03 PROCEDURE — 2580000003 HC RX 258: Performed by: NURSE PRACTITIONER

## 2021-10-03 PROCEDURE — 2500000003 HC RX 250 WO HCPCS: Performed by: NURSE PRACTITIONER

## 2021-10-03 PROCEDURE — 99232 SBSQ HOSP IP/OBS MODERATE 35: CPT | Performed by: INTERNAL MEDICINE

## 2021-10-03 PROCEDURE — 94761 N-INVAS EAR/PLS OXIMETRY MLT: CPT

## 2021-10-03 PROCEDURE — 80048 BASIC METABOLIC PNL TOTAL CA: CPT

## 2021-10-03 PROCEDURE — 6370000000 HC RX 637 (ALT 250 FOR IP)

## 2021-10-03 RX ORDER — METOPROLOL TARTRATE 100 MG/1
100 TABLET ORAL 2 TIMES DAILY
Status: DISCONTINUED | OUTPATIENT
Start: 2021-10-03 | End: 2021-10-04 | Stop reason: HOSPADM

## 2021-10-03 RX ORDER — HEPARIN SODIUM 5000 [USP'U]/ML
5000 INJECTION, SOLUTION INTRAVENOUS; SUBCUTANEOUS EVERY 8 HOURS SCHEDULED
Status: DISCONTINUED | OUTPATIENT
Start: 2021-10-03 | End: 2021-10-04 | Stop reason: HOSPADM

## 2021-10-03 RX ADMIN — DEXTROSE MONOHYDRATE 12.5 G: 25 INJECTION, SOLUTION INTRAVENOUS at 05:06

## 2021-10-03 RX ADMIN — ESCITALOPRAM OXALATE 10 MG: 10 TABLET ORAL at 09:09

## 2021-10-03 RX ADMIN — IPRATROPIUM BROMIDE AND ALBUTEROL SULFATE 1 AMPULE: .5; 3 SOLUTION RESPIRATORY (INHALATION) at 10:38

## 2021-10-03 RX ADMIN — Medication 500 MCG: at 09:09

## 2021-10-03 RX ADMIN — ALLOPURINOL 100 MG: 100 TABLET ORAL at 09:09

## 2021-10-03 RX ADMIN — METHYLPREDNISOLONE SODIUM SUCCINATE 30 MG: 40 INJECTION, POWDER, FOR SOLUTION INTRAMUSCULAR; INTRAVENOUS at 15:31

## 2021-10-03 RX ADMIN — INSULIN HUMAN 150 UNITS: 500 INJECTION, SOLUTION SUBCUTANEOUS at 17:54

## 2021-10-03 RX ADMIN — FERROUS SULFATE TAB 325 MG (65 MG ELEMENTAL FE) 325 MG: 325 (65 FE) TAB at 09:08

## 2021-10-03 RX ADMIN — ASPIRIN 81 MG: 81 TABLET, COATED ORAL at 09:08

## 2021-10-03 RX ADMIN — METOPROLOL TARTRATE 50 MG: 50 TABLET, FILM COATED ORAL at 09:08

## 2021-10-03 RX ADMIN — INSULIN LISPRO 2 UNITS: 100 INJECTION, SOLUTION INTRAVENOUS; SUBCUTANEOUS at 20:47

## 2021-10-03 RX ADMIN — Medication 1000 UNITS: at 09:08

## 2021-10-03 RX ADMIN — METOPROLOL TARTRATE 100 MG: 100 TABLET, FILM COATED ORAL at 20:39

## 2021-10-03 RX ADMIN — IPRATROPIUM BROMIDE AND ALBUTEROL SULFATE 1 AMPULE: .5; 3 SOLUTION RESPIRATORY (INHALATION) at 07:06

## 2021-10-03 RX ADMIN — IPRATROPIUM BROMIDE AND ALBUTEROL SULFATE 1 AMPULE: .5; 3 SOLUTION RESPIRATORY (INHALATION) at 15:38

## 2021-10-03 RX ADMIN — PANTOPRAZOLE SODIUM 40 MG: 40 TABLET, DELAYED RELEASE ORAL at 05:06

## 2021-10-03 RX ADMIN — HEPARIN SODIUM 5000 UNITS: 5000 INJECTION INTRAVENOUS; SUBCUTANEOUS at 15:32

## 2021-10-03 RX ADMIN — VENLAFAXINE HYDROCHLORIDE 75 MG: 75 CAPSULE, EXTENDED RELEASE ORAL at 09:09

## 2021-10-03 RX ADMIN — BUTALBITA,ACETAMINOPHEN AND CAFFEINE 1 CAPSULE: 50; 300; 40 CAPSULE ORAL at 09:08

## 2021-10-03 RX ADMIN — ANTI-FUNGAL POWDER MICONAZOLE NITRATE TALC FREE: 1.42 POWDER TOPICAL at 09:08

## 2021-10-03 RX ADMIN — METHYLPREDNISOLONE SODIUM SUCCINATE 30 MG: 40 INJECTION, POWDER, FOR SOLUTION INTRAMUSCULAR; INTRAVENOUS at 23:32

## 2021-10-03 RX ADMIN — BUTALBITA,ACETAMINOPHEN AND CAFFEINE 1 CAPSULE: 50; 300; 40 CAPSULE ORAL at 17:54

## 2021-10-03 RX ADMIN — FUROSEMIDE 20 MG: 10 INJECTION, SOLUTION INTRAMUSCULAR; INTRAVENOUS at 17:54

## 2021-10-03 RX ADMIN — OXYCODONE HYDROCHLORIDE 10 MG: 10 TABLET ORAL at 23:32

## 2021-10-03 RX ADMIN — OXYCODONE HYDROCHLORIDE 10 MG: 10 TABLET ORAL at 15:27

## 2021-10-03 RX ADMIN — GABAPENTIN 300 MG: 300 CAPSULE ORAL at 09:09

## 2021-10-03 RX ADMIN — BUDESONIDE AND FORMOTEROL FUMARATE DIHYDRATE 2 PUFF: 160; 4.5 AEROSOL RESPIRATORY (INHALATION) at 07:16

## 2021-10-03 RX ADMIN — Medication 9 MG: at 23:32

## 2021-10-03 RX ADMIN — GUAIFENESIN 1200 MG: 600 TABLET, EXTENDED RELEASE ORAL at 09:08

## 2021-10-03 RX ADMIN — CLOPIDOGREL BISULFATE 75 MG: 75 TABLET ORAL at 09:08

## 2021-10-03 RX ADMIN — DOCUSATE SODIUM 100 MG: 100 CAPSULE, LIQUID FILLED ORAL at 09:09

## 2021-10-03 RX ADMIN — OXYCODONE HYDROCHLORIDE 10 MG: 10 TABLET ORAL at 06:19

## 2021-10-03 RX ADMIN — SODIUM CHLORIDE, PRESERVATIVE FREE 10 ML: 5 INJECTION INTRAVENOUS at 10:17

## 2021-10-03 RX ADMIN — FUROSEMIDE 20 MG: 10 INJECTION, SOLUTION INTRAMUSCULAR; INTRAVENOUS at 09:08

## 2021-10-03 ASSESSMENT — PAIN DESCRIPTION - DESCRIPTORS
DESCRIPTORS: ACHING;CONSTANT
DESCRIPTORS: ACHING;CONSTANT

## 2021-10-03 ASSESSMENT — ENCOUNTER SYMPTOMS
VOMITING: 0
RHINORRHEA: 0
SINUS PAIN: 0
DIARRHEA: 0
NAUSEA: 0
ABDOMINAL PAIN: 0
CONSTIPATION: 0
PHOTOPHOBIA: 0
CHEST TIGHTNESS: 0
COUGH: 0
SHORTNESS OF BREATH: 0
ABDOMINAL DISTENTION: 0
SORE THROAT: 0

## 2021-10-03 ASSESSMENT — PAIN DESCRIPTION - DIRECTION: RADIATING_TOWARDS: LOWER BACK

## 2021-10-03 ASSESSMENT — PAIN DESCRIPTION - LOCATION
LOCATION: BACK
LOCATION: HEAD
LOCATION: BACK
LOCATION: BACK

## 2021-10-03 ASSESSMENT — PAIN DESCRIPTION - FREQUENCY
FREQUENCY: INTERMITTENT
FREQUENCY: INTERMITTENT

## 2021-10-03 ASSESSMENT — PAIN SCALES - GENERAL
PAINLEVEL_OUTOF10: 9
PAINLEVEL_OUTOF10: 0
PAINLEVEL_OUTOF10: 4
PAINLEVEL_OUTOF10: 9
PAINLEVEL_OUTOF10: 9
PAINLEVEL_OUTOF10: 8
PAINLEVEL_OUTOF10: 3
PAINLEVEL_OUTOF10: 8

## 2021-10-03 ASSESSMENT — PAIN DESCRIPTION - ONSET
ONSET: ON-GOING
ONSET: ON-GOING

## 2021-10-03 ASSESSMENT — PAIN DESCRIPTION - PAIN TYPE
TYPE: CHRONIC PAIN

## 2021-10-03 ASSESSMENT — PAIN DESCRIPTION - ORIENTATION
ORIENTATION: LOWER
ORIENTATION: LOWER

## 2021-10-03 NOTE — PROGRESS NOTES
Department of Internal Medicine  Nephrology Renay Paniagua MD   Progress Note    Reason for consultation: Management of acute kidney injury and chronic kidney disease stage III. Consulting physician: Gabriel Sanchez MD    Interval history: Patient feels well today and is nonoliguric. He is currently on IV Lasix 20 mg twice daily with decent diuretic response. VQ scan performed yesterday showed low probability for pulmonary embolism. History of present illness: This is a 63 y.o. male with a significant past medical history of coronary artery disease, heart failure with preserved ejection fraction [HFrEF with LVEF 55%], chronic pancreatitis, degenerative disc disease, obstructive sleep apnea chronic kidney disease stage III [baseline serum creatinine 1.4 to 1.5 mg/dL], who presented to the emergency department for further evaluation of worsening shortness of breath. He had been recently admitted to [facility] for management of unstable angina and discharged on 9/14/2021. He underwent cardiac catheterization at [facility] on 9/11/2021 with finding of nonobstructive coronary artery disease. Laboratory studies at presentation on 9/30/2021 were remarkable for serum potassium 5.3 mmol/L and BUN/creatinine 66/2.41 mg/dL and hence nephrology consultation. X-ray today showed cardiomegaly, pulmonary vascular congestion and trace bilateral pleural effusions. He has received a total of 60 mg of IV Lasix since admission and is on heparin drip. Home medications included Bumex 1 mg p.o. 3 times daily and spironolactone 50 mg p.o. twice a week. He states that he has lost 31 pounds by diet in the last 8 months.     Scheduled Meds:   insulin lispro  0-6 Units SubCUTAneous TID WC    insulin lispro  0-3 Units SubCUTAneous Nightly    methylPREDNISolone  30 mg IntraVENous Q12H    furosemide  20 mg IntraVENous BID    nicotine  1 patch TransDERmal Daily    insulin regular human  150 Units SubCUTAneous TID WC    allopurinol  100 mg Oral Daily    amitriptyline  50 mg Oral Nightly    aspirin  81 mg Oral Daily    clopidogrel  75 mg Oral Daily    docusate sodium  100 mg Oral BID    escitalopram  10 mg Oral Daily    ferrous sulfate  325 mg Oral Daily    budesonide-formoterol  2 puff Inhalation BID    gabapentin  300 mg Oral BID    latanoprost  1 drop Both Eyes Nightly    metoprolol tartrate  50 mg Oral BID    miconazole   Topical BID    pantoprazole  40 mg Oral QAM AC    rosuvastatin  10 mg Oral Nightly    [Held by provider] spironolactone  50 mg Oral Once per day on Mon Thu    venlafaxine  75 mg Oral Daily    vitamin B-12  500 mcg Oral Daily    Vitamin D  1,000 Units Oral Daily    sodium chloride flush  5-40 mL IntraVENous 2 times per day    ipratropium-albuterol  1 ampule Inhalation Q4H WA    guaiFENesin  1,200 mg Oral BID     Continuous Infusions:   dextrose      sodium chloride       PRN Meds:.sodium chloride flush, sodium chloride flush, glucose, dextrose, glucagon (rDNA), dextrose, butalbital-APAP-caffeine, melatonin, nitroGLYCERIN, oxyCODONE HCl, tiZANidine, sodium chloride flush, sodium chloride, ondansetron **OR** ondansetron, polyethylene glycol, acetaminophen **OR** acetaminophen, albuterol    Physical Exam:    VITALS:  BP (!) 161/81   Pulse 81   Temp 98.3 °F (36.8 °C) (Oral)   Resp 20   Ht 6' (1.829 m)   Wt (!) 399 lb 0.5 oz (181 kg)   SpO2 95%   BMI 54.12 kg/m²   24HR INTAKE/OUTPUT:      Intake/Output Summary (Last 24 hours) at 10/3/2021 1052  Last data filed at 10/3/2021 0810  Gross per 24 hour   Intake --   Output 425 ml   Net -425 ml     Constitutional: alert, appears stated age and cooperative    Skin: Skin color, texture, turgor normal. No rashes or lesions    Head: Normocephalic, without obvious abnormality, atraumatic     Cardiovascular/Edema: regular rate and rhythm, S1, S2 normal, no murmur, click, rub or gallop    Respiratory: diminished breath sounds bilaterally    Abdomen: soft, non-tender; bowel sounds normal; no masses,  no organomegaly    Back: symmetric, no curvature. ROM normal. No CVA tenderness. Extremities: edema Trace to 1+ bilateral pedal edema which patient states is improved with chronic stasis dermatitis    Neuro:  Grossly normal    CBC:   Recent Labs     10/01/21  0447 10/02/21  0629 10/03/21  0527   WBC 9.2 8.8 10.2   HGB 8.3* 7.9* 8.0*    189 205     BMP:    Recent Labs     10/01/21  0447 10/01/21  0447 10/01/21  1757 10/02/21  0629 10/03/21  0527   *  --   --  136 139   K 5.9*   < > 5.8* 5.0 4.6   CL 94*  --   --  96* 99   CO2 23  --   --  24 25   BUN 65*  --   --  82* 88*   CREATININE 2.36*  --   --  2.28* 1.97*   GLUCOSE 319*  --   --  452* 123*    < > = values in this interval not displayed.      Lab Results   Component Value Date    NITRU NEGATIVE 10/01/2021    COLORU Dark Yellow 10/01/2021    PHUR 5.5 10/01/2021    WBCUA None 02/17/2016    RBCUA 0 TO 2 02/17/2016    MUCUS NOT REPORTED 02/17/2016    TRICHOMONAS NOT REPORTED 02/17/2016    YEAST NOT REPORTED 02/17/2016    BACTERIA NOT REPORTED 02/17/2016    CLARITYU Clear 10/31/2018    SPECGRAV 1.025 10/01/2021    LEUKOCYTESUR NEGATIVE 10/01/2021    UROBILINOGEN Normal 10/01/2021    BILIRUBINUR NEGATIVE 10/01/2021    BILIRUBINUR neg 06/30/2016    BLOODU Negative 10/31/2018    GLUCOSEU NEGATIVE 10/01/2021    KETUA NEGATIVE 10/01/2021    AMORPHOUS NOT REPORTED 02/17/2016     Urine Sodium:     Lab Results   Component Value Date    MARIA L 49 10/01/2021     Urine Potassium:    Lab Results   Component Value Date    KUR 53.9 10/01/2021     Urine Chloride:    Lab Results   Component Value Date    CLUR 45 10/01/2021     Urine Osmolarity:   Lab Results   Component Value Date    OSMOU 363 02/17/2016     Urine Protein:     Lab Results   Component Value Date    TPU <4 09/12/2021     Urine Creatinine:     Lab Results   Component Value Date    LABCREA 38.5 01/20/2021 IMPRESSION/RECOMMENDATIONS:      1. Acute kidney injury superimposed on chronic kidney disease stage III - most consistent with prerenal azotemia from diuretic therapy. However, given clinical and radiologic evidence of pulmonary edema, we will continue diuretics. Renal function is stable. Plan: Continue IV Lasix 20 mg twice daily. Monitor urine output closely. Basic metabolic profile daily. 2.  Systemic hypertension - Blood pressure control is suboptimal.  Will increase metoprolol to 100 mg p.o. twice daily. 4.  Hyponatremia- Secondary to fluid translocation consequent to hyperglycemia. Resolved. 5.  Normocytic anemia likely secondary to chronic disease and chronic kidney disease. Iron stores are adequate [iron saturation 27% and ferritin 669]. Start Retacrit 3000 units subcutaneously 3 times a week. Prognosis is guarded.     Maribel Viveros MD FACP  Attending Nephrologist  10/3/2021 10:52 AM

## 2021-10-03 NOTE — PLAN OF CARE
Problem: Pain:  Goal: Pain level will decrease  Description: Pain level will decrease  Outcome: Ongoing  Note: Patient has chronic back pain and chronic headaches that he takes medication for daily.   Continue with home regime to keep comfortable     Problem: Pain:  Goal: Control of acute pain  Description: Control of acute pain  Outcome: Ongoing     Problem: Pain:  Goal: Control of chronic pain  Description: Control of chronic pain  Outcome: Ongoing     Problem: Falls - Risk of:  Goal: Will remain free from falls  Description: Will remain free from falls  Outcome: Ongoing  Note: Assist as needed      Problem: Falls - Risk of:  Goal: Absence of physical injury  Description: Absence of physical injury  Outcome: Ongoing     Problem: Skin Integrity:  Goal: Will show no infection signs and symptoms  Description: Will show no infection signs and symptoms  Outcome: Ongoing  Note: Afebrile     Problem: Skin Integrity:  Goal: Absence of new skin breakdown  Description: Absence of new skin breakdown  Outcome: Ongoing  Note: Chronic BLE skin scaling from BLEedema     Problem: Skin Integrity:  Goal: Risk for impaired skin integrity will decrease  Description: Risk for impaired skin integrity will decrease  Outcome: Ongoing     Problem: Coping:  Goal: Level of anxiety will decrease  Description: Level of anxiety will decrease  Outcome: Ongoing  Note: With cessation of smoking patient needs to find something to occupy his hands, ie: hobby     Problem: Coping:  Goal: Ability to cope will improve  Description: Ability to cope will improve  Outcome: Ongoing  Note: Possibly needs anti anxiety to help him get through smoking cessation     Problem: Respiratory:  Goal: Ability to maintain a clear airway will improve  Description: Ability to maintain a clear airway will improve  Outcome: Ongoing     Problem: Respiratory:  Goal: Ability to maintain adequate ventilation will improve  Description: Ability to maintain adequate ventilation will improve  Outcome: Ongoing     Problem: Respiratory:  Goal: Complications related to the disease process, condition or treatment will be avoided or minimized  Description: Complications related to the disease process, condition or treatment will be avoided or minimized  Outcome: Ongoing

## 2021-10-03 NOTE — PROGRESS NOTES
Pulmonary Progress Note  [facility]      Patient - Jacky Lau,  Age - 63 y.o.    - 1965      Room Number - 2115/2115-01   N -  [id number]   [facility]  Date of Admission -  2021  6:08 Trent Foster MD  Primary Care Physician - Wendy Shell MD     SUBJECTIVE   Patient appears to be in fair spirits. Eating lunch with the spouse at present  Saturations 95% on 2 L   OBJECTIVE   VITALS    height is 6' (1.829 m) and weight is 399 lb 0.5 oz (181 kg) (abnormal). His oral temperature is 98.3 °F (36.8 °C). His blood pressure is 161/81 (abnormal) and his pulse is 81. His respiration is 20 and oxygen saturation is 95%. Body mass index is 54.12 kg/m². Temperature Range: Temp: 98.3 °F (36.8 °C) Temp  Av °F (36.7 °C)  Min: 97.5 °F (36.4 °C)  Max: 98.3 °F (36.8 °C)  BP Range:  Systolic (63DLK), JZL:640 , Min:111 , OVN:726     Diastolic (99QVN), PQJ:82, Min:56, Max:81    Pulse Range: Pulse  Av.3  Min: 69  Max: 81  Respiration Range: Resp  Av.1  Min: 16  Max: 20  Current Pulse Ox[de-identified]  SpO2: 95 %  24HR Pulse Ox Range:  SpO2  Av.8 %  Min: 92 %  Max: 99 %  Oxygen Amount and Delivery: O2 Flow Rate (L/min): 2 L/min    Wt Readings from Last 3 Encounters:   10/03/21 (!) 399 lb 0.5 oz (181 kg)   21 (!) 414 lb (187.8 kg)   21 (!) 414 lb 1.6 oz (187.8 kg)       I/O (24 Hours)    Intake/Output Summary (Last 24 hours) at 10/3/2021 1201  Last data filed at 10/3/2021 0810  Gross per 24 hour   Intake --   Output 425 ml   Net -425 ml       EXAM     General Appearance  Awake, alert, oriented, in no acute distress  HEENT - normocephalic, atraumatic. Neck - Supple,  trachea midline   Lungs -worse breath sounds. There are still some wheezing and rhonchi but faint. No crackles  Heart Exam:PMI normal. No lifts, heaves, or thrills. RRR.  No murmurs, clicks, gallops, or rubs  Abdomen Exam: Abdomen soft, non-tender.  BS normal.   Extremity Exam: No signs of cyanosis    MEDS      insulin lispro  0-6 Units SubCUTAneous TID     insulin lispro  0-3 Units SubCUTAneous Nightly    [START ON 10/4/2021] epoetin leslie-epbx  3,000 Units SubCUTAneous Once per day on Mon Wed Fri    metoprolol tartrate  100 mg Oral BID    methylPREDNISolone  30 mg IntraVENous Q12H    furosemide  20 mg IntraVENous BID    nicotine  1 patch TransDERmal Daily    insulin regular human  150 Units SubCUTAneous TID     allopurinol  100 mg Oral Daily    amitriptyline  50 mg Oral Nightly    aspirin  81 mg Oral Daily    clopidogrel  75 mg Oral Daily    docusate sodium  100 mg Oral BID    escitalopram  10 mg Oral Daily    ferrous sulfate  325 mg Oral Daily    budesonide-formoterol  2 puff Inhalation BID    gabapentin  300 mg Oral BID    latanoprost  1 drop Both Eyes Nightly    miconazole   Topical BID    pantoprazole  40 mg Oral QAM AC    rosuvastatin  10 mg Oral Nightly    [Held by provider] spironolactone  50 mg Oral Once per day on Mon Thu    venlafaxine  75 mg Oral Daily    vitamin B-12  500 mcg Oral Daily    Vitamin D  1,000 Units Oral Daily    sodium chloride flush  5-40 mL IntraVENous 2 times per day    ipratropium-albuterol  1 ampule Inhalation Q4H WA    guaiFENesin  1,200 mg Oral BID      dextrose      sodium chloride       sodium chloride flush, sodium chloride flush, glucose, dextrose, glucagon (rDNA), dextrose, butalbital-APAP-caffeine, melatonin, nitroGLYCERIN, oxyCODONE HCl, tiZANidine, sodium chloride flush, sodium chloride, ondansetron **OR** ondansetron, polyethylene glycol, acetaminophen **OR** acetaminophen, albuterol    LABS   CBC   Recent Labs     10/03/21  0527   WBC 10.2   HGB 8.0*   HCT 24.9*   MCV 84.2        BMP:   Lab Results   Component Value Date     10/03/2021    K 4.6 10/03/2021    CL 99 10/03/2021    CO2 25 10/03/2021    BUN 88 10/03/2021    LABALBU 3.5 09/30/2021 CREATININE 1.97 10/03/2021    CALCIUM 9.1 10/03/2021    GFRAA 43 10/03/2021    LABGLOM 35 10/03/2021     ABGs:  Lab Results   Component Value Date    PHART 7.410 08/29/2016    PO2ART 76.8 08/29/2016    CLQ6TUJ 38.6 08/29/2016      Lab Results   Component Value Date    MODE NOT REPORTED 09/30/2021     Ionized Calcium:  No results found for: IONCA  Magnesium:    Lab Results   Component Value Date    MG 2.3 09/12/2021     Phosphorus:    Lab Results   Component Value Date    PHOS 3.6 09/14/2021        LIVER PROFILE   Recent Labs     09/30/21  1947 10/01/21  0447 10/01/21  1757   AST 14  --   --    ALT 23  --   --    LIPASE  --    < > 24   BILITOT 0.76  --   --    ALKPHOS 85  --   --     < > = values in this interval not displayed. INR No results for input(s): INR in the last 72 hours.   PTT   Lab Results   Component Value Date    APTT 31.6 09/30/2021         RADIOLOGY     (See actual reports for details)    ASSESSMENT/PLAN     Patient Active Problem List   Diagnosis    DDD (degenerative disc disease), lumbar    Hypertension    Otitis externa    Hypertriglyceridemia    Acute kidney injury superimposed on CKD (HCC)    Anemia of chronic renal failure, stage 3b ([address]    CAD (coronary artery disease)    Chronic obstructive pulmonary disease (HCC)    Type 2 diabetes mellitus with diabetic polyneuropathy, with long-term current use of insulin (HCC)    Chronic pancreatitis ([address]    Essential hypertension    Displacement of lumbar intervertebral disc without myelopathy    Uncontrolled type 2 diabetes mellitus with hyperglycemia (HCC)    Chronic diastolic congestive heart failure (HCC)    Insomnia    Diabetes mellitus type 2 in obese (HCC)    BPH (benign prostatic hyperplasia)    Morbid obesity with BMI of 50.0-59.9, adult ([facility])    Lower abdominal pain    Hepatic steatosis    History of rib fracture    Umbilical hernia    Adrenal gland anomaly    Spinal stenosis of lumbar region without neurogenic claudication    Chronic back pain greater than 3 months duration    Gastroesophageal reflux disease without esophagitis    Hyperlipidemia with target LDL less than 70    Lower urinary tract symptoms (LUTS)    Vitamin D deficiency    Iron deficiency anemia due to chronic blood loss    BARBY treated with BiPAP    Chronic midline low back pain with left-sided sciatica    Stasis dermatitis of both legs    Anxiety    Intertrigo axillary b/l    History of recurrent ear infection    Mixed conductive and sensorineural hearing loss of both ears    Tinnitus of both ears    Slow transit constipation    Chronic infection of both external ears    Tinea pedis of both feet    Onychomycosis    Moderate episode of recurrent major depressive disorder ([address]    Hydrocele, bilateral    BARBY on CPAP    Xerosis cutis    Celiac artery stenosis (HCC)    Myopia    Nuclear nonsenile cataract    Presbyopia    Postlaminectomy syndrome of lumbar region    Venous insufficiency of left lower extremity    Bilateral hearing loss    Seizures ([address]    Recurrent infection of skin    Vitamin B 12 deficiency    Mobility impaired    Chronic respiratory failure with hypoxia (HCC)    Chronic infective otitis externa    Hypomagnesemia    Chronic malignant otitis externa of both ears    Psoriasis    Tinea corporis    Colon cancer screening declined    Excoriation    Lymphedema of both lower extremities    Venous insufficiency of both lower extremities    PVD (peripheral vascular disease) ([address]    Chronic gout due to renal impairment without tophus    Chest pain    Unstable angina (HCC)    Acute on chronic congestive heart failure (HCC)    Elevated troponin    LBBB (left bundle branch block)    Anemia, normocytic normochromic    Acute on chronic respiratory failure with hypoxia (HCC)    NSTEMI (non-ST elevated myocardial infarction) ([facility])     IMPRESSION:  1.  Acute-upon-chronic hypoxemic respiratory failure.  The patient does  Does not appear to have chronic hypercapnia  2.  Acute-upon-chronic kidney disease. 3.  CHF, preserved ejection fraction. 4.  Morbid obesity with a BMI of 56.  5.  COPD, clinically severe. 6.  Tobacco abuse, two packs per day.     She is off heparin drip. VQ scan low probability Dopplers negative for DVT  If the patient is discharged, he can be discharged on a prednisone tapering regimen. On DuoNeb treatments, on Symbicort. Wean O2 down as tolerated  On nicotine patch. Patient advised smoking cessation. He voiced understanding that his tobacco use will put him at risk for increased morbidity and early mortality. I fear that he is not ready to quit. He voiced understand that weight loss is very important to decrease his chances of morbidity mortality. Understands not to smoke and wear oxygen same time. Patient last saw us in office in 2016. According to my office staff, he either canceled or was a no-show in 2017  He has been receiving his O2 and his medications through Dr. Alejandra Valiente office in the recent past..  If the patient wishes, he can follow-up with me in office in 4 to 6 weeks. [facility]    Discharge at other services discretion. Spoke to bedside nurse in detail.     Electronically signed by Christos Garcia MD on 10/3/2021 at 12:01 PM

## 2021-10-03 NOTE — PROGRESS NOTES
Physical Therapy        Physical Therapy Cancel Note      DATE: 10/3/2021    NAME: Noel Gomes   MRN: [id number]   : 1965      Patient not seen this date for Physical Therapy due to:    Pt is independent in functional mobility- no PT needed at this time      Electronically signed by Nickolas Crigler, PT on 10/3/2021 at 10:04 AM

## 2021-10-03 NOTE — PROGRESS NOTES
[address] [employer]    PROGRESS NOTE             10/3/2021    7:38 AM    Name:   Kevin Cuba. MRN:     [id number]     Acct:      [de-identified]   Room:   2115/2115-01   Day:  3  Admit Date:  9/30/2021  6:08 PM    PCP:  Madison Hatchet, MD  Code Status:  DNR-CCA    Subjective:     C/C:   Chief Complaint   Patient presents with    Nasal Congestion    Cough    Rib Pain    Shortness of Breath     Interval History Status: not changed. Patient is examined at bedside in progressive. Reports he is comfortable now, with no rib/ abdominal pain or coughing. Reports he had black tarry stools yesterday but the BM after that was \"normal,\" brown and solid. Satting 95% on 2L O2, below his home dose. Patient reports drinking 10 bottles of H2O daily and changing his diet to improve his gout. Brief History:     Patient is a 62 yo male with history of DM2, anemia secondary to CKD, HTN, CAD s/p stent, HTN, pancreatitis, BARBY, hydroceles, gout, presented to ED with 2 days of SOB and chest pain. PCP directed him to go to ED due to headache, chest and ribs sore from coughing, dyspnea, and fatigue. She wanted him to call 911 but patient refused due to not wanting to go back to [facility], the closest hospital for him. PCP called his daughter who brought him to ED. Patient is vaccinated 2x with pfizer. Patient is a longtime smoker that quit 10 months ago. He started smoking 2 pack a day again 1 month ago. He is interested and motivated to quit. On 3L/min home O2. Review of Systems:     Review of Systems   Constitutional: Negative for appetite change, chills, fatigue and fever. Diaphoresis: whole body sweats overnight with cough, not currently. HENT: Negative for congestion, rhinorrhea, sinus pain and sore throat. Eyes: Negative for photophobia and visual disturbance.    Respiratory: Negative for cough (coughing spell overnight and with lying), chest tightness and shortness of breath. Cardiovascular: Positive for leg swelling (chronic). Negative for chest pain. Gastrointestinal: Negative for abdominal distention, abdominal pain (chronic), constipation, diarrhea, nausea and vomiting. Endocrine: Positive for polydipsia (\"always drank a lot of liquids\" 10 bottles of water a day) and polyuria. Genitourinary: Positive for frequency and scrotal swelling (7 years, seen by uro). Negative for difficulty urinating and dysuria. Musculoskeletal: Positive for gait problem. Back pain: since 89, s/p back surgery. Skin: Positive for rash (b/l LE). Neurological: Positive for dizziness (w/ walking). Negative for headaches. Psychiatric/Behavioral: Negative for agitation, behavioral problems and confusion. Medications: Allergies: Allergies   Allergen Reactions    Levofloxacin Anaphylaxis     Patient reports needing epinephrine \"about 5 or 6 months ago\" for anaphylaxis (itching, hives, SOB/swelling) after receiving Levofloxacin. Previous report from 08/20/2012: Nausea/Vomiting    Lorazepam      Falls      Nsaids      CHF&CKD    Prozac [Fluoxetine Hcl] Other (See Comments)     Pt started with seizures after started taking.  Vancomycin Other (See Comments)     Itching, SOB, emesis upon infusion in ED 6/12/2019. Patient states he has had vancomycin \"a number of times\" before without issue. couldn't breath and talk, throat closed       Current Meds:   Scheduled Meds:    methylPREDNISolone  30 mg IntraVENous Q12H    furosemide  20 mg IntraVENous BID    nicotine  1 patch TransDERmal Daily    insulin lispro  0-18 Units SubCUTAneous TID     insulin lispro  0-9 Units SubCUTAneous Nightly    insulin regular human  150 Units SubCUTAneous TID     allopurinol  100 mg Oral Daily    amitriptyline  50 mg Oral Nightly    aspirin  81 mg Oral Daily    clopidogrel  75 mg Oral Daily    docusate sodium  100 mg Oral BID    escitalopram  10 mg Oral Daily    ferrous sulfate  325 mg Oral Daily    budesonide-formoterol  2 puff Inhalation BID    gabapentin  300 mg Oral BID    latanoprost  1 drop Both Eyes Nightly    metoprolol tartrate  50 mg Oral BID    miconazole   Topical BID    pantoprazole  40 mg Oral QAM AC    rosuvastatin  10 mg Oral Nightly    [Held by provider] spironolactone  50 mg Oral Once per day on Mon Thu    venlafaxine  75 mg Oral Daily    vitamin B-12  500 mcg Oral Daily    Vitamin D  1,000 Units Oral Daily    sodium chloride flush  5-40 mL IntraVENous 2 times per day    ipratropium-albuterol  1 ampule Inhalation Q4H WA    guaiFENesin  1,200 mg Oral BID     Continuous Infusions:    dextrose      sodium chloride       PRN Meds: sodium chloride flush, sodium chloride flush, glucose, dextrose, glucagon (rDNA), dextrose, butalbital-APAP-caffeine, melatonin, nitroGLYCERIN, oxyCODONE HCl, tiZANidine, sodium chloride flush, sodium chloride, ondansetron **OR** ondansetron, polyethylene glycol, acetaminophen **OR** acetaminophen, albuterol    Data:     Past Medical History:   has a past medical history of Acute on chronic kidney failure (HCC), Acute on chronic respiratory failure (HCC), Adhesive capsulitis of left shoulder, Anxiety, Arthropathy, unspecified, other specified sites, Asthma, B12 deficiency, Bilateral lower leg cellulitis, Blood in stool, CAD (coronary artery disease), Cardiovascular stress test abnormal, Cellulitis of both lower extremities, Cellulitis of leg, left, CHF (congestive heart failure), NYHA class III ([facility]), Chronic back pain, Chronic bronchitis ([address] Chronic headaches, Chronic respiratory failure ([address] Chronic ulcer of left leg, with fat layer exposed ([address] Class 2 severe obesity due to excess calories with serious comorbidity and body mass index (BMI) of 35.0 to 35.9 in [facility]), COPD exacerbation ([address] Diabetic neuropathy ([address] Displacement of lumbar intervertebral disc without myelopathy, Ear infection, Essential hypertension, Facial cellulitis, Fall, GERD (gastroesophageal reflux disease), Head injury, Hearing loss in right ear, Hepatic steatosis, History of general anesthesia complication, History of rib fracture, Hyperlipidemia, Hypersomnia, Hypertension, Insomnia, Intolerance of continuous positive airway pressure (CPAP) ventilation, Iron deficiency, Localized rash, Magnesium deficiency, Mastoiditis of left side, Mixed conductive and sensorineural hearing loss of both ears, Mixed type COPD (chronic obstructive pulmonary disease) (HCC), Moderate recurrent major depression ([address] Morbid obesity with BMI of 45.0-49.9, adult ([address] On home oxygen therapy, Open wound of groin, BARBY on CPAP, Osteoarthritis, Otitis externa of left ear, Pancreatitis chronic, Persistent depressive disorder, Renal insufficiency, Severe depression ([address] Spinal stenosis of lumbar region without neurogenic claudication, Syncope, Tinnitus of both ears, Type 2 diabetes mellitus with stage 3 chronic kidney disease, with long-term current use of insulin ([address] Type II or unspecified type diabetes mellitus without mention of complication, not stated as uncontrolled, Vitamin D deficiency, and Wears glasses. Social History:   reports that he quit smoking about 11 months ago. His smoking use included cigarettes. He started smoking about 36 years ago. He has a 8.25 pack-year smoking history. He quit smokeless tobacco use about 26 years ago. His smokeless tobacco use included snuff. He reports previous drug use. Drug: Marijuana. He reports that he does not drink alcohol.      Family History:   Family History   Problem Relation Age of Onset    Heart Disease Mother          age 64 from MI   [de-identified] High Blood Pressure Mother     Diabetes Mother     High Blood Pressure Father          age 80 from CKD and Lung Fibrosis    Kidney Disease Father     Heart Disease Sister     Heart Attack Sister     Obesity Sister     Diabetes Sister Vitals:  BP (!) 156/64   Pulse 73   Temp 97.5 °F (36.4 °C) (Oral)   Resp 20   Ht 6' (1.829 m)   Wt (!) 399 lb 0.5 oz (181 kg)   SpO2 99%   BMI 54.12 kg/m²   Temp (24hrs), Av.7 °F (36.5 °C), Min:97 °F (36.1 °C), Max:98.2 °F (36.8 °C)    Recent Labs     10/02/21  1639 10/02/21  1958 10/03/21  0502 10/03/21  0525   POCGLU 209* 185* 51* 109       I/O(24Hr): Intake/Output Summary (Last 24 hours) at 10/3/2021 0738  Last data filed at 10/2/2021 1849  Gross per 24 hour   Intake 700 ml   Output 275 ml   Net 425 ml     Labs:  [unfilled]    Lab Results   Component Value Date/Time    SPECIAL NOT REPORTED 2019 09:00 AM     Lab Results   Component Value Date/Time    CULTURE (A) 2019 09:00 AM     STREPTOCOCCI, BETA HEMOLYTIC GROUP G  MODERATE GROWTH      CULTURE DIPHTHEROIDS  MODERATE GROWTH   (A) 2019 09:00 AM    CULTURE NO ANAEROBIC ORGANISMS ISOLATED  AT 5 DAYS   (A) 2019 09:00 AM     [unfilled]    Radiology:    XR CHEST (SINGLE VIEW FRONTAL)    Result Date: 10/1/2021  EXAMINATION: ONE XRAY VIEW OF THE CHEST 10/1/2021 9:44 pm COMPARISON: 10/01/2021 5:51 a.m. HISTORY: ORDERING SYSTEM PROVIDED HISTORY: PICC line placement TECHNOLOGIST PROVIDED HISTORY: PICC line placement Reason for Exam: R sided PICC line placement Acuity: Acute Type of Exam: Ongoing FINDINGS: Interval placement of right upper extremity PICC, tip overlying the cavoatrial junction. No evidence of pneumothorax. Cardiac silhouette appears mildly prominent. Persistent bibasilar interstitial opacities. Costophrenic angles are not included within the field of view. No acute osseous abnormalities. 1. Interval placement of right upper extremity PICC, tip overlying the cavoatrial junction. No evidence of pneumothorax. 2. Persistent bibasilar interstitial opacities. 3. Cardiac silhouette appears prominent.      CT CHEST WO CONTRAST    Result Date: 2021  EXAMINATION: CT OF THE CHEST WITHOUT CONTRAST 2021 1:56 am TECHNIQUE: CT of the chest was performed without the administration of intravenous contrast. Multiplanar reformatted images are provided for review. Dose modulation, iterative reconstruction, and/or weight based adjustment of the mA/kV was utilized to reduce the radiation dose to as low as reasonably achievable. COMPARISON: CT PA dated 03/27/2018. HISTORY: ORDERING SYSTEM PROVIDED HISTORY: high risk chest pain, aortic dissection? TECHNOLOGIST PROVIDED HISTORY: high risk chest pain, aortic dissection? Reason for Exam: chest pain FINDINGS: Mediastinum:  No evidence of mediastinal, hilar or axillary lymphadenopathy. Aorta is normal in caliber without acute abnormality although evaluation is limited without IV contrast.  Aortic caliber is unchanged compared to the chest CTA of 03/27/2018. The central airways are clear. Lungs/pleura: Aside from minor bibasilar atelectasis, the lungs are clear. No pneumothorax or pleural effusion. Upper Abdomen:  Limited images of the upper abdomen demonstrate no acute abnormality. Soft Tissues/Bones: Surrounding osseous and soft tissue structures are unremarkable. The IV was lost and contrast could not be given. Dissection therefore cannot be excluded. This noncontrast exam is normal for age. Findings discussed with the ordering physician 09/11/2021 at 0321 hours. US RENAL COMPLETE    Result Date: 9/12/2021  EXAMINATION: RETROPERITONEAL ULTRASOUND OF THE KIDNEYS AND URINARY BLADDER 9/12/2021 COMPARISON: CT abdomen pelvis from 12/03/2015 HISTORY: ORDERING SYSTEM PROVIDED HISTORY: lakisha TECHNOLOGIST PROVIDED HISTORY: lakisha 21-year-old male with acute kidney injury FINDINGS: Kidneys: Exam is limited due to patient's body habitus. Right kidney measures 12.5 x 6.7 x 6.8 cm. Right renal cortical thickness measures 1.3 cm. The left kidney was difficult to properly visualize. No right-sided hydronephrosis or perinephric fluid.  Gross preservation of the right-sided corticomedullary differentiation. Bladder: Urinary bladder is empty. 1. Limited visualization of the right kidney grossly unremarkable. No right-sided hydronephrosis. 2. Left kidney was difficult to properly visualize due to patient's body habitus. 3. Empty urinary bladder. XR CHEST PORTABLE    Result Date: 10/1/2021  EXAMINATION: ONE XRAY VIEW OF THE CHEST 10/1/2021 5:49 am COMPARISON: 30 September 2021 HISTORY: ORDERING SYSTEM PROVIDED HISTORY: AECOPD TECHNOLOGIST PROVIDED HISTORY: AECOPD Reason for Exam: AECOPD Acuity: Unknown Type of Exam: Unknown Additional signs and symptoms: AECOPD Relevant Medical/Surgical History: AECOPD FINDINGS: AP portable view of the chest time stamped at 537 hours demonstrates overlying cardiac monitoring electrodes, stable cardiomegaly, pulmonary vascular congestion, and bibasilar opacities favoring atelectasis or interstitial edema over infectious process. Trace effusions are noted. No extrapleural air. Osseous structures are unremarkable. Cardiomegaly. Pulmonary vascular congestion, trace effusions and bibasilar opacities favoring interstitial edema or atelectasis over infectious process. XR CHEST PORTABLE    Result Date: 9/30/2021  EXAMINATION: ONE XRAY VIEW OF THE CHEST 9/30/2021 6:53 pm COMPARISON: 09/11/2021 HISTORY: ORDERING SYSTEM PROVIDED HISTORY: shortness of breath TECHNOLOGIST PROVIDED HISTORY: shortness of breath Reason for Exam: Shortness of breath, hx CHF and copd Acuity: Acute Type of Exam: Initial Additional signs and symptoms: Shortness of breath, hx CHF and copd Relevant Medical/Surgical History: Shortness of breath, hx CHF and copd FINDINGS: Similar appearing prominence of the cardiac silhouette. Mild interstitial indistinctness. Mild bibasilar linear appearing opacifications are favored to represent atelectasis. No convincing focal consolidation. Mild blunting the right costophrenic angle. The left costophrenic is not included in the field of view.   No evidence of pneumothorax. No acute osseous abnormalities. 1. Similar appearing prominence of the cardiac silhouette. 2. Mild interstitial indistinctness may related to vascular congestion. 3. Bibasilar linear opacifications are favored to represent atelectasis. No convincing focal consolidation. XR CHEST PORTABLE    Result Date: 9/11/2021  EXAMINATION: ONE XRAY VIEW OF THE CHEST 9/11/2021 12:57 am COMPARISON: 03/28/2019 HISTORY: ORDERING SYSTEM PROVIDED HISTORY: chest pain TECHNOLOGIST PROVIDED HISTORY: chest pain Reason for Exam: upright FINDINGS: Portable study is limited by obesity. Mild right basilar opacity. Lungs are otherwise grossly clear. No pneumothorax or pleural fluid. Heart size is within normal limits. No acute bone finding. Right basilar opacity may represent airspace disease or superimposed shadows on this portable study obtained on an obese patient. When the patient is able, a follow-up PA and lateral examination of the chest would be helpful. US SCROTUM W LIMITED DUPLEX    Result Date: 9/3/2021  EXAMINATION: ULTRASOUND OF THE SCROTUM/TESTICLES WITH COLOR DOPPLER FLOW EVALUATION 9/3/2021 COMPARISON: None. HISTORY: ORDERING SYSTEM PROVIDED HISTORY: Edema of scrotum TECHNOLOGIST PROVIDED HISTORY: This procedure can be scheduled via [employer]. Access your [employer] account by visiting [email or web address]. FINDINGS: Measurements: Right testicle: 5.0 x 2.1 x 3.0 cm Left testicle: 4.8 x 2.4 x 3.0 cm Right: Grey scale: The right testicle demonstrates unremarkable homogeneous echotexture without focal lesion. No evidence of testicular microlithiasis. Doppler Evaluation:  There is normal arterial and venous Doppler flow within the testicle. Scrotal Sac: Scrotal swelling. Large hydrocele with low-level internal echoes. Epididymis:  Nonvisualized. Left: Grey scale: The left testicle demonstrates unremarkable homogeneous echotexture without focal lesion.   No evidence of testicular microlithiasis. Doppler Evaluation:  There is normal arterial and venous Doppler flow within the testicle. Scrotal Sac:  Scrotal swelling. Large hydrocele with low-level internal echoes and scrotal pearls. Epididymis:  Nonvisualized. Scrotal swelling/edema bilaterally with large complex hydroceles, and scrotal pearls on the left. Nonvisualization epididymides. No testicular abnormality. Physical Examination:        Physical Exam  Constitutional:       General: He is not in acute distress. Appearance: He is not ill-appearing, toxic-appearing or diaphoretic. HENT:      Head: Normocephalic and atraumatic. Nose: No congestion or rhinorrhea. Eyes:      Extraocular Movements: Extraocular movements intact. Conjunctiva/sclera: Conjunctivae normal.   Cardiovascular:      Rate and Rhythm: Normal rate and regular rhythm. Pulmonary:      Effort: Pulmonary effort is normal. No respiratory distress. Breath sounds: No wheezing or rales. Abdominal:      General: There is no distension. Tenderness: There is no abdominal tenderness. Musculoskeletal:      Right lower leg: Edema present. Left lower leg: Edema present. Skin:     Findings: Lesion (b/l LE edema) present. Neurological:      General: No focal deficit present. Mental Status: He is alert. Mental status is at baseline.    Psychiatric:         Mood and Affect: Mood normal.         Behavior: Behavior normal.       Assessment:        Primary Problem  Acute on chronic respiratory failure with hypoxia [facility])    Active Hospital Problems    Diagnosis Date Noted    NSTEMI (non-ST elevated myocardial infarction) ([facility] [I21.4]     Acute on chronic respiratory failure with hypoxia ([facility] [J96.21] 09/30/2021    Elevated troponin [R77.8]     BARBY on CPAP [G47.33, Z99.89] 07/20/2017    Diabetes mellitus type 2 in obese ([facility] [E11.69, E66.9] 02/06/2014    Uncontrolled type 2 diabetes mellitus with hyperglycemia ([facility] [E11.65] 07/15/2013    Anemia of chronic renal failure, stage 3b ([facility]) [O39.33, D63.1] 05/08/2013    Type 2 diabetes mellitus with diabetic polyneuropathy, with long-term current use of insulin ([facility]) [E11.42, Z79.4]     Acute kidney injury superimposed on CKD ([facility] [N17.9, N18.9] 04/10/2013    Hypertension [I10]        Plan:        1. Multifactorial acute on chronic respiratory failure 2/2 COPD vs URI vs CHF vs [place] vs asthma exacerbation              a. Patient was weaned back below home level of NC 3L/min to 2L/min              b. Methylprednisolone weaned to 30mg q12h, continue wean/ PO change per pulm              c. Albuterol nebulizer q2h prn              d. Symbicort 2 puff bid              e. Ipratropium albuterol q4h              f. CXR showed fluid overload; clinical improvement w/ steroids, Ddimer                 1.69, ordered VQ scan, low probability of PE              g. Sputum gram stain, resp culture pending               h. Pulm consulted, recs appreciated     2. Hyperglycemia 2/2 uncontrolled DM Type 2              a. Patient apparently on 500U/ ml regular insulin, 0.52ml at breakfast,                    0.52ml at lunch, 0.45ml at dinner. Called to confirm with pharmacy                        and PCP. Patient reports doses as 0.72/ 0.72/ 0.4ml. We will start                    with 150U tid              b. Glucose 5am was 51              c. POCT glucose check qid, hypoglycemia protocol              d. Adjust glucose dose as needed as steroids are decreased     3. LIZETH on CKD                          a. Avoid NSAIDS                          b. Trend Cr 2.41 > 2.28 > 1.97                           c. Nephrology consulted, recs appreciated                                      i. Kayexalate, lasix ordered    d. Presumed anemia 2/2 CKD; new sx of black tarry stool, patient refused FOBT      4.  NSTEMI              a. 9/11/21 Cath report showed stenosis 20-30% LAD, 10-20% LCX, 10-20% RCA              b. Troponin trending down: 127 > 130 > 83     5. BARBY              a. Has home CPAP, has not been using recently as it's not working     6. Gout                          a. Allopurinol 100mg daily     7. Tobacco cessation              a. Patient motivated to quit cigarettes again. Discussed strategies such as holding a straw instead of a cigarette. Can follow up with PCP to discuss chantix. Patient is amenable to using bupropion for smoking cessation, which would benefit patient from a smoking and mood disorder perspective. Other patient meds amitriptyline, venlafaxine, escitalopram would need to be amended to avoid SS. Patient does not like nicotine patches as they don't stick to him. Neftaly Rider MD  10/3/2021  7:38 AM   Attending Physician Statement  I have discussed the care of [address] [place] [address]. and I have examined the patient myselft and taken ros and hpi , including pertinent history and exam findings,  with the resident. I have reviewed the key elements of all parts of the encounter with the resident. I agree with the assessment, plan and orders as documented by the resident.   Acute on  resp failure with copd and chf  58-year-old  gentleman severe obese morbid morbid obesity BMI 55.9 weighs 412 pounds chronic respiratory failure on home oxygen active smoker admitted with increasing dyspnea for last few days elevated proBNP acute on chronic diastolic congestive heart failure with obesity hypoventilation syndrome likely high-output cardiac failure secondary to anemia recently upper and lower endoscopy was done  Also have underlying COPD with COPD exacerbation with uncontrolled diabetes with steroids IV steroids DuoNeb may need BiPAP gentle diuresis LIZETH on CKD Baseline creatinine 1.6-1.8 now presenting with 2.4  Multiple comorbid conditions with obesity anemia COPD and CHF high risk of admission mortality and morbidity   hyoglycemia cut back on long acting insulin    Electronically signed by Bettina Vidales MD

## 2021-10-03 NOTE — PLAN OF CARE
Problem: Pain:  Goal: Pain level will decrease  Description: Pain level will decrease  10/3/2021 0051 by Chip James RN  Outcome: Ongoing     Problem: Pain:  Goal: Control of chronic pain  Description: Control of chronic pain  10/3/2021 0051 by Chip James RN  Outcome: Ongoing     Problem: Falls - Risk of:  Goal: Will remain free from falls  Description: Will remain free from falls  10/3/2021 0051 by Chip James RN  Outcome: Ongoing     Problem: Skin Integrity:  Goal: Will show no infection signs and symptoms  Description: Will show no infection signs and symptoms  10/3/2021 0051 by Chip James RN  Outcome: Ongoing     Problem:  Activity:  Goal: Fatigue will decrease  Description: Fatigue will decrease  10/3/2021 0051 by Chip James RN  Outcome: Ongoing     Problem: Cardiac:  Goal: Hemodynamic stability will improve  Description: Hemodynamic stability will improve  10/3/2021 0051 by Chip James RN  Outcome: Ongoing     Problem: Respiratory:  Goal: Ability to maintain a clear airway will improve  Description: Ability to maintain a clear airway will improve  Outcome: Ongoing

## 2021-10-03 NOTE — PROGRESS NOTES
[address]   OCCUPATIONAL THERAPY MISSED TREATMENT NOTE   INPATIENT   Date: 10/3/21  Patient Name: Lilliam Martínez Room:   MRN: [id number]   Account #: [de-identified]    : 1965  (59 y.o.)  Gender: male                 REASON FOR MISSED TREATMENT:  [id number]   -   Pt independent in room. Pt demonstrated independence with functional mobility with O2 monitored with ability to maintain above 90%. Pt demonstrated ability to reach BLE to complete lower body dressing. Pt reports home set up and equipment that is accessible. Pt denies concerns with completing self care tasks at this time. No further OT needs.           Mian Nicolas, OT

## 2021-10-03 NOTE — PROGRESS NOTES
BM is examined and found to be brown and non-tarry. Planned to discharge tomorrow AM. Patient is upset at being kept in the hospital for \"problems he did not come in for. \" Discussed at length his unresolved LIZETH on CKD, hyperglycemia, and steroid taper and transition to PO meds. Patient can sign out AMA at this time if he wishes.

## 2021-10-04 VITALS
SYSTOLIC BLOOD PRESSURE: 148 MMHG | HEART RATE: 66 BPM | OXYGEN SATURATION: 95 % | DIASTOLIC BLOOD PRESSURE: 84 MMHG | RESPIRATION RATE: 16 BRPM | TEMPERATURE: 97.7 F | WEIGHT: 315 LBS | HEIGHT: 72 IN | BODY MASS INDEX: 42.66 KG/M2

## 2021-10-04 LAB
ANION GAP SERPL CALCULATED.3IONS-SCNC: 13 MMOL/L (ref 9–17)
BUN BLDV-MCNC: 85 MG/DL (ref 6–20)
BUN/CREAT BLD: ABNORMAL (ref 9–20)
CALCIUM SERPL-MCNC: 9.3 MG/DL (ref 8.6–10.4)
CHLORIDE BLD-SCNC: 101 MMOL/L (ref 98–107)
CO2: 27 MMOL/L (ref 20–31)
CREAT SERPL-MCNC: 1.87 MG/DL (ref 0.7–1.2)
GFR AFRICAN AMERICAN: 45 ML/MIN
GFR NON-AFRICAN AMERICAN: 38 ML/MIN
GFR SERPL CREATININE-BSD FRML MDRD: ABNORMAL ML/MIN/{1.73_M2}
GFR SERPL CREATININE-BSD FRML MDRD: ABNORMAL ML/MIN/{1.73_M2}
GLUCOSE BLD-MCNC: 182 MG/DL (ref 75–110)
GLUCOSE BLD-MCNC: 216 MG/DL (ref 70–99)
HCT VFR BLD CALC: 26.9 % (ref 41–53)
HEMOGLOBIN: 8.6 G/DL (ref 13.5–17.5)
MCH RBC QN AUTO: 27.2 PG (ref 26–34)
MCHC RBC AUTO-ENTMCNC: 32 G/DL (ref 31–37)
MCV RBC AUTO: 85.2 FL (ref 80–100)
NRBC AUTOMATED: ABNORMAL PER 100 WBC
PDW BLD-RTO: 17.5 % (ref 11.5–14.9)
PLATELET # BLD: 230 K/UL (ref 150–450)
PMV BLD AUTO: 8.5 FL (ref 6–12)
POTASSIUM SERPL-SCNC: 4.9 MMOL/L (ref 3.7–5.3)
RBC # BLD: 3.16 M/UL (ref 4.5–5.9)
SODIUM BLD-SCNC: 141 MMOL/L (ref 135–144)
WBC # BLD: 10.1 K/UL (ref 3.5–11)

## 2021-10-04 PROCEDURE — 94660 CPAP INITIATION&MGMT: CPT

## 2021-10-04 PROCEDURE — 99239 HOSP IP/OBS DSCHRG MGMT >30: CPT | Performed by: INTERNAL MEDICINE

## 2021-10-04 PROCEDURE — 80048 BASIC METABOLIC PNL TOTAL CA: CPT

## 2021-10-04 PROCEDURE — 2700000000 HC OXYGEN THERAPY PER DAY

## 2021-10-04 PROCEDURE — 6370000000 HC RX 637 (ALT 250 FOR IP): Performed by: INTERNAL MEDICINE

## 2021-10-04 PROCEDURE — 6370000000 HC RX 637 (ALT 250 FOR IP): Performed by: NURSE PRACTITIONER

## 2021-10-04 PROCEDURE — 85027 COMPLETE CBC AUTOMATED: CPT

## 2021-10-04 PROCEDURE — 94640 AIRWAY INHALATION TREATMENT: CPT

## 2021-10-04 PROCEDURE — 94761 N-INVAS EAR/PLS OXIMETRY MLT: CPT

## 2021-10-04 PROCEDURE — 82947 ASSAY GLUCOSE BLOOD QUANT: CPT

## 2021-10-04 RX ORDER — NICOTINE 21 MG/24HR
1 PATCH, TRANSDERMAL 24 HOURS TRANSDERMAL DAILY
Qty: 30 PATCH | Refills: 3 | Status: SHIPPED
Start: 2021-10-04 | End: 2022-03-02

## 2021-10-04 RX ORDER — PREDNISONE 1 MG/1
TABLET ORAL
Qty: 21 TABLET | Refills: 0 | Status: SHIPPED | OUTPATIENT
Start: 2021-10-04 | End: 2021-10-13

## 2021-10-04 RX ORDER — GUAIFENESIN 600 MG/1
1200 TABLET, EXTENDED RELEASE ORAL 2 TIMES DAILY
Qty: 60 TABLET | Refills: 0 | Status: SHIPPED | OUTPATIENT
Start: 2021-10-04 | End: 2021-10-19

## 2021-10-04 RX ADMIN — GUAIFENESIN 1200 MG: 600 TABLET, EXTENDED RELEASE ORAL at 08:36

## 2021-10-04 RX ADMIN — VENLAFAXINE HYDROCHLORIDE 75 MG: 75 CAPSULE, EXTENDED RELEASE ORAL at 08:36

## 2021-10-04 RX ADMIN — ASPIRIN 81 MG: 81 TABLET, COATED ORAL at 08:36

## 2021-10-04 RX ADMIN — ESCITALOPRAM OXALATE 10 MG: 10 TABLET ORAL at 08:36

## 2021-10-04 RX ADMIN — GABAPENTIN 300 MG: 300 CAPSULE ORAL at 08:36

## 2021-10-04 RX ADMIN — IPRATROPIUM BROMIDE AND ALBUTEROL SULFATE 1 AMPULE: .5; 3 SOLUTION RESPIRATORY (INHALATION) at 07:05

## 2021-10-04 RX ADMIN — DOCUSATE SODIUM 100 MG: 100 CAPSULE, LIQUID FILLED ORAL at 08:36

## 2021-10-04 RX ADMIN — ALLOPURINOL 100 MG: 100 TABLET ORAL at 08:36

## 2021-10-04 RX ADMIN — METOPROLOL TARTRATE 100 MG: 100 TABLET, FILM COATED ORAL at 08:36

## 2021-10-04 RX ADMIN — CLOPIDOGREL BISULFATE 75 MG: 75 TABLET ORAL at 08:36

## 2021-10-04 RX ADMIN — Medication 500 MCG: at 08:36

## 2021-10-04 RX ADMIN — BUDESONIDE AND FORMOTEROL FUMARATE DIHYDRATE 2 PUFF: 160; 4.5 AEROSOL RESPIRATORY (INHALATION) at 07:05

## 2021-10-04 RX ADMIN — FERROUS SULFATE TAB 325 MG (65 MG ELEMENTAL FE) 325 MG: 325 (65 FE) TAB at 08:36

## 2021-10-04 RX ADMIN — Medication 1000 UNITS: at 08:36

## 2021-10-04 ASSESSMENT — PAIN SCALES - GENERAL: PAINLEVEL_OUTOF10: 0

## 2021-10-04 NOTE — DISCHARGE INSTR - COC
Continuity of Care Form    Patient Name: Kate Dee. :  1965  MRN:  [id number]    Admit date:  2021  Discharge date:  ***    Code Status Order: DNR-CCA   Advance Directives:     Admitting Physician:  Daysi Jeffries MD  PCP: Jennifer Chairez MD    Discharging Nurse: [facility]/Room#: 2115/2115-01  Discharging Unit Phone Number: ***    Emergency Contact:   Extended Emergency Contact Information  Primary Emergency Contact: [facility]  Address: [address] [place] [address] Phone: [phone]  Mobile Phone: [phone]  Relation: Spouse  Hearing or visual needs: None  Other needs: None  Preferred language: [place]   needed?  No  Secondary Emergency Contact: [address] [place] [address] Phone: [phone]  Mobile Phone: [phone]  Relation: Child    Past Surgical History:  Past Surgical History:   Procedure Laterality Date    BACK SURGERY   (x 4) ,.2011.2012     Dr Baron Barnhart last 2 [address]  2018    PATENT OM STENT    COLONOSCOPY  2015    hemorrhoids, poor prep, not done    COLONOSCOPY      COLONOSCOPY N/A 2021    COLONOSCOPY POLYPECTOMY SNARE/COLD BIOPSY/HOT BIOPSY/CLIP APPLICATION X1 performed by Nicole Rice MD at [address] [facility]  03/2013    x 1    HAND TENDON SURGERY Left     thumb tendon repair    INTRACAPSULAR CATARACT EXTRACTION Right 2019    EYE CATARACT EMULSIFICATION IOL IMPLANT performed by Gerson Clay MD at [address] [place] [address] Left 2020    EYE CATARACT EMULSIFICATION IOL IMPLANT performed by Gerson Clay MD at [address] [place] [address] ARTHROSCOPY Left     NERVE BLOCK  2015    TENS unit    [place] ESOPHAGOGASTRODUODENOSCOPY TRANSORAL DIAGNOSTIC N/A 2018    EGD ESOPHAGOGASTRODUODENOSCOPY performed by Nicole Rice MD at [facility] TYMPANOMASTOIDECTOMY Bilateral 09/20/2012    Dr Tyron Lane       Immunization History:   Immunization History   Administered Date(s) Administered    COVID-19, Naida Herman, PF, 100mcg/0.5mL 04/29/2021, 05/27/2021    DT (pediatric) 12/14/1998    Hepatitis B Adult (Engerix-B) 05/29/2019, 07/31/2019, 12/04/2019    Hepatitis B Adult (Recombivax HB) 05/29/2019, 07/31/2019, 12/04/2019    Influenza Virus Vaccine 12/16/2013, 10/23/2014, 10/12/2015    Influenza, Radha Roughen, IM, PF (6 mo and older Fluzone, Flulaval, Fluarix, and 3 yrs and older Afluria) 10/11/2016, 11/04/2017, 09/18/2018, 10/04/2019, 10/09/2020    Pneumococcal Polysaccharide ([id number]) 05/23/2013    Tdap (Boostrix, Adacel) 09/12/2018       Active Problems:  Patient Active Problem List   Diagnosis Code    DDD (degenerative disc disease), lumbar M51.36    Hypertension I10    Otitis externa H60.90    Hypertriglyceridemia E78.1    Acute kidney injury superimposed on CKD ([facility] N17.9, N18.9    Anemia of chronic renal failure, stage 3b (HCC) N18.32, D63.1    CAD (coronary artery disease) I25.10    Chronic obstructive pulmonary disease (HCC) J44.9    Type 2 diabetes mellitus with diabetic polyneuropathy, with long-term current use of insulin (HCC) E11.42, Z79.4    Chronic pancreatitis ([facility] K86.1    Essential hypertension I10    Displacement of lumbar intervertebral disc without myelopathy M51.26    Uncontrolled type 2 diabetes mellitus with hyperglycemia (HCC) E11.65    Acute on chronic diastolic congestive heart failure (HCC) I50.33    Insomnia G47.00    Diabetes mellitus type 2 in obese (HCC) E11.69, E66.9    BPH (benign prostatic hyperplasia) N40.0    Morbid obesity with BMI of 50.0-59.9, adult (HCC) E66.01, Z68.43    Lower abdominal pain R10.30    Hepatic steatosis K76.0    History of rib fracture Q71.52    Umbilical hernia F63.4    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      Update Admission H&P: {CHP DME Changes in SGNBR:[id number]}    PHYSICIAN SIGNATURE:  {Esignature:[id number]}

## 2021-10-04 NOTE — PROGRESS NOTES
Bipap on standby at this time. Pulse Ox--95% r/a Pt did not wear last night. Skin score--0      Nasal gel pad available at bedside. Pt awake/alert/no distress noted.        BRONCHOSPASM/BRONCHOCONSTRICTION     [x]         IMPROVE AERATION/BREATH SOUNDS  [x]   ADMINISTER BRONCHODILATOR THERAPY AS APPROPRIATE  [x]   ASSESS BREATH SOUNDS  []   IMPLEMENT AEROSOL/MDI PROTOCOL  [x]   PATIENT EDUCATION AS NEEDED

## 2021-10-04 NOTE — PLAN OF CARE
Problem: Pain:  Goal: Pain level will decrease  Description: Pain level will decrease  10/4/2021 0101 by Ban Galindo RN  Outcome: Ongoing     Problem: Pain:  Goal: Control of chronic pain  Description: Control of chronic pain  10/4/2021 0101 by Bna Galindo RN  Outcome: Ongoing     Problem: Falls - Risk of:  Goal: Will remain free from falls  Description: Will remain free from falls  10/4/2021 0101 by Ban Galindo RN  Outcome: Ongoing     Problem: Skin Integrity:  Goal: Will show no infection signs and symptoms  Description: Will show no infection signs and symptoms  10/4/2021 0101 by Ban Galindo RN  Outcome: Ongoing     Problem:  Activity:  Goal: Fatigue will decrease  Description: Fatigue will decrease  10/4/2021 0101 by Ban Galindo RN  Outcome: Ongoing     Problem: Nutritional:  Goal: Consumption of the prescribed amount of daily calories will improve  Description: Consumption of the prescribed amount of daily calories will improve  10/4/2021 0101 by Ban Galindo RN  Outcome: Ongoing

## 2021-10-04 NOTE — PROGRESS NOTES
Pt eager to go  [facility] to [employer] pt with pcp with labls  Dc summary per resident dr Calvin Clayton MD  10/4/2021

## 2021-10-04 NOTE — DISCHARGE SUMMARY
[address]    Discharge Summary     Patient ID: Carmen Gunter   :  1965   MRN: [id number]     ACCOUNT:  [de-identified]   Patient's PCP: David Whalen MD  Admit Date: 2021   Discharge Date: 10/4/2021   Length of Stay: 4  Code Status:  Prior  Admitting Physician: Kelsy Kwon MD  Discharge Physician: Vinay Erickson MD     Active Discharge Diagnoses:     Primary Problem  Acute on chronic respiratory failure with hypoxia [facility])      [facility]    Diagnosis Date Noted    NSTEMI (non-ST elevated myocardial infarction) ([facility] [I21.4]     Acute on chronic respiratory failure with hypoxia ([facility] [J96.21] 2021    Elevated troponin [R77.8]     BARBY on CPAP [G47.33, Z99.89] 2017    Diabetes mellitus type 2 in obese ([facility] [E11.69, E66.9] 2014    Acute on chronic diastolic congestive heart failure ([facility] [I50.33] 2013    Uncontrolled type 2 diabetes mellitus with hyperglycemia ([facility] [E11.65] 07/15/2013    Anemia of chronic renal failure, stage 3b (HCC) [Q28.80, D63.1] 2013    Type 2 diabetes mellitus with diabetic polyneuropathy, with long-term current use of insulin (HCC) [E11.42, Z79.4]     COPD (chronic obstructive pulmonary disease) ([facility] [J44.9]     Acute kidney injury superimposed on CKD ([facility] [N17.9, N18.9] 04/10/2013    Hypertension [I10]      Admission Condition:  poor   Discharged Condition: fair    Hospital Stay:     Hospital Course:  Carmen Gunter is a 63 y.o. male who was admitted for the management of  Acute on chronic respiratory failure with hypoxia ([facility] , presented to ER with *Nasal Congestion, Cough, Rib Pain, and Shortness of Breath    Patient was directed to ED by PCP after he called her saying his \"my ribs hurt from coughing, whole body hurts, breathing treatments with albuterol not working, and increased oxygen at 4.5 L/min O2\" and \"can't take a full breath,\" feeling weak, and can't stand. Patient was weaned to [facility] 3L/min. Methylprednisolon was weaned 60mg q8h and weaned over several days. Lasix was given for pulmonary edema. D-dimer and troponin was elevated on admission. CTPE not obtained due to LIZETH, patient was put on heparin drip. VQ scan showed low probability for PE, and LE doppler were negative for clot. LIZETH on CKD with elevated K on admission and downtrending creatinine over several days. After patient's dyspnea resolved, he did not want to stay until it returned to baseline. On 10/2/21, patient reported new \"black, tarry stools\" and chronic abdominal pain. Hgb was in the 7-8s, down from admission 2 weeks ago which was Hgb in the 10s, and November 2020 in the 13s. Due to constellation of symptoms, although patient had reason for low erythropoietin, GI bleed was considered. Patient reported having EGD/ colonoscopy in last 6 months, although EGD record could not be found. FOBT was refused by patient. Patient was diuresed with IV lasix 20mg bid. Spironolactone was held due to hyperkalemia. Patient reports he quit smoking for 10 months and started smoking 2 packs a day again 1 month ago. Reported 90 pack year history. Cath last month showed less than 20-30% obstruction. After patient's dyspnea resolved, he refused to stay until his creatinine resolved. Ordered lab tests in one week.     Significant therapeutic interventions: n/a    Significant Diagnostic Studies:   Labs / Micro:  CBC:   Lab Results   Component Value Date    WBC 10.1 10/04/2021    RBC 3.16 10/04/2021    HGB 8.6 10/04/2021    HCT 26.9 10/04/2021    MCV 85.2 10/04/2021    MCH 27.2 10/04/2021    MCHC 32.0 10/04/2021    RDW 17.5 10/04/2021     10/04/2021     BMP:    Lab Results   Component Value Date    GLUCOSE 216 10/04/2021     10/04/2021    K 4.9 10/04/2021     10/04/2021    CO2 27 10/04/2021    ANIONGAP 13 10/04/2021    BUN 85 10/04/2021    CREATININE 1.87 10/04/2021    BUNCRER NOT REPORTED 10/04/2021    CALCIUM 9.3 10/04/2021    LABGLOM 38 10/04/2021    GFRAA 45 10/04/2021    GFR      10/04/2021    GFR NOT REPORTED 10/04/2021     CMP:    Lab Results   Component Value Date    GLUCOSE 216 10/04/2021     10/04/2021    K 4.9 10/04/2021     10/04/2021    CO2 27 10/04/2021    BUN 85 10/04/2021    CREATININE 1.87 10/04/2021    ANIONGAP 13 10/04/2021    ALKPHOS 85 09/30/2021    ALT 23 09/30/2021    AST 14 09/30/2021    BILITOT 0.76 09/30/2021    LABALBU 3.5 09/30/2021    ALBUMIN NOT REPORTED 09/30/2021    LABGLOM 38 10/04/2021    GFRAA 45 10/04/2021    GFR      10/04/2021    GFR NOT REPORTED 10/04/2021    PROT 7.3 09/30/2021    CALCIUM 9.3 10/04/2021     PT/INR:    Lab Results   Component Value Date    PROTIME 10.3 06/23/2016    INR 1.0 06/23/2016     U/A:    Lab Results   Component Value Date    COLORU Dark Yellow 10/01/2021    TURBIDITY Clear 10/01/2021    SPECGRAV 1.025 10/01/2021    HGBUR NEGATIVE 10/01/2021    PHUR 5.5 10/01/2021    PROTEINU NEGATIVE 10/01/2021    GLUCOSEU NEGATIVE 10/01/2021    KETUA NEGATIVE 10/01/2021    BILIRUBINUR NEGATIVE 10/01/2021    BILIRUBINUR neg 06/30/2016    UROBILINOGEN Normal 10/01/2021    NITRU NEGATIVE 10/01/2021    LEUKOCYTESUR NEGATIVE 10/01/2021     Radiology:  XR CHEST (SINGLE VIEW FRONTAL)    Result Date: 10/1/2021  EXAMINATION: ONE XRAY VIEW OF THE CHEST 10/1/2021 9:44 pm COMPARISON: 10/01/2021 5:51 a.m. HISTORY: ORDERING SYSTEM PROVIDED HISTORY: PICC line placement TECHNOLOGIST PROVIDED HISTORY: PICC line placement Reason for Exam: R sided PICC line placement Acuity: Acute Type of Exam: Ongoing FINDINGS: Interval placement of right upper extremity PICC, tip overlying the cavoatrial junction. No evidence of pneumothorax. Cardiac silhouette appears mildly prominent. Persistent bibasilar interstitial opacities. Costophrenic angles are not included within the field of view. No acute osseous abnormalities.      1. Interval placement of right upper extremity PICC, tip overlying the cavoatrial junction. No evidence of pneumothorax. 2. Persistent bibasilar interstitial opacities. 3. Cardiac silhouette appears prominent. CT CHEST WO CONTRAST    Result Date: 9/11/2021  EXAMINATION: CT OF THE CHEST WITHOUT CONTRAST 9/11/2021 1:56 am TECHNIQUE: CT of the chest was performed without the administration of intravenous contrast. Multiplanar reformatted images are provided for review. Dose modulation, iterative reconstruction, and/or weight based adjustment of the mA/kV was utilized to reduce the radiation dose to as low as reasonably achievable. COMPARISON: CT PA dated 03/27/2018. HISTORY: ORDERING SYSTEM PROVIDED HISTORY: high risk chest pain, aortic dissection? TECHNOLOGIST PROVIDED HISTORY: high risk chest pain, aortic dissection? Reason for Exam: chest pain FINDINGS: Mediastinum:  No evidence of mediastinal, hilar or axillary lymphadenopathy. Aorta is normal in caliber without acute abnormality although evaluation is limited without IV contrast.  Aortic caliber is unchanged compared to the chest CTA of 03/27/2018. The central airways are clear. Lungs/pleura: Aside from minor bibasilar atelectasis, the lungs are clear. No pneumothorax or pleural effusion. Upper Abdomen:  Limited images of the upper abdomen demonstrate no acute abnormality. Soft Tissues/Bones: Surrounding osseous and soft tissue structures are unremarkable. The IV was lost and contrast could not be given. Dissection therefore cannot be excluded. This noncontrast exam is normal for age. Findings discussed with the ordering physician 09/11/2021 at 0321 hours. NM LUNG SCAN PERFUSION ONLY    Result Date: 10/2/2021  EXAMINATION: NUCLEAR MEDICINE PERFUSION SCAN. 10/2/2021 TECHNIQUE: Ventilation not performed as part of COVID-19 safety precautions.   7.5 millicuries of Tc 45U MAA was administered intravenously prior to planar imaging of the lungs in multiple projections. COMPARISON: Chest radiograph 10/01/2021. HISTORY: ORDERING SYSTEM PROVIDED HISTORY: elevated ddimer and dyspnea TECHNOLOGIST PROVIDED HISTORY: elevated ddimer and dyspnea Reason for Exam: elevated d-dimer, and dyspnea Acuity: Unknown Type of Exam: Unknown Additional signs and symptoms: SOB x many years, CP, coughing, not coughing up blood, smokes about 2 pack per day, COPD, no hx of blood clots, heart cath x 2 weeks ago FINDINGS: PERFUSION: Distribution of radiotracer is a slightly heterogeneous. No discrete segmental defect. . CHEST RADIOGRAPH: Bibasilar interstitial opacities. Low Probability for Pulmonary Embolus. US RENAL COMPLETE    Result Date: 9/12/2021  EXAMINATION: RETROPERITONEAL ULTRASOUND OF THE KIDNEYS AND URINARY BLADDER 9/12/2021 COMPARISON: CT abdomen pelvis from 12/03/2015 HISTORY: ORDERING SYSTEM PROVIDED HISTORY: lakisha TECHNOLOGIST PROVIDED HISTORY: lakisha 58-year-old male with acute kidney injury FINDINGS: Kidneys: Exam is limited due to patient's body habitus. Right kidney measures 12.5 x 6.7 x 6.8 cm. Right renal cortical thickness measures 1.3 cm. The left kidney was difficult to properly visualize. No right-sided hydronephrosis or perinephric fluid. Gross preservation of the right-sided corticomedullary differentiation. Bladder: Urinary bladder is empty. 1. Limited visualization of the right kidney grossly unremarkable. No right-sided hydronephrosis. 2. Left kidney was difficult to properly visualize due to patient's body habitus. 3. Empty urinary bladder.      XR CHEST PORTABLE    Result Date: 10/1/2021  EXAMINATION: ONE XRAY VIEW OF THE CHEST 10/1/2021 5:49 am COMPARISON: 30 September 2021 HISTORY: ORDERING SYSTEM PROVIDED HISTORY: AECOPD TECHNOLOGIST PROVIDED HISTORY: AECOPD Reason for Exam: AECOPD Acuity: Unknown Type of Exam: Unknown Additional signs and symptoms: AECOPD Relevant Medical/Surgical History: AECOPD FINDINGS: AP portable view of the chest time stamped at 537 hours demonstrates overlying cardiac monitoring electrodes, stable cardiomegaly, pulmonary vascular congestion, and bibasilar opacities favoring atelectasis or interstitial edema over infectious process. Trace effusions are noted. No extrapleural air. Osseous structures are unremarkable. Cardiomegaly. Pulmonary vascular congestion, trace effusions and bibasilar opacities favoring interstitial edema or atelectasis over infectious process. XR CHEST PORTABLE    Result Date: 9/30/2021  EXAMINATION: ONE XRAY VIEW OF THE CHEST 9/30/2021 6:53 pm COMPARISON: 09/11/2021 HISTORY: ORDERING SYSTEM PROVIDED HISTORY: shortness of breath TECHNOLOGIST PROVIDED HISTORY: shortness of breath Reason for Exam: Shortness of breath, hx CHF and copd Acuity: Acute Type of Exam: Initial Additional signs and symptoms: Shortness of breath, hx CHF and copd Relevant Medical/Surgical History: Shortness of breath, hx CHF and copd FINDINGS: Similar appearing prominence of the cardiac silhouette. Mild interstitial indistinctness. Mild bibasilar linear appearing opacifications are favored to represent atelectasis. No convincing focal consolidation. Mild blunting the right costophrenic angle. The left costophrenic is not included in the field of view. No evidence of pneumothorax. No acute osseous abnormalities. 1. Similar appearing prominence of the cardiac silhouette. 2. Mild interstitial indistinctness may related to vascular congestion. 3. Bibasilar linear opacifications are favored to represent atelectasis. No convincing focal consolidation. XR CHEST PORTABLE    Result Date: 9/11/2021  EXAMINATION: ONE XRAY VIEW OF THE CHEST 9/11/2021 12:57 am COMPARISON: 03/28/2019 HISTORY: ORDERING SYSTEM PROVIDED HISTORY: chest pain TECHNOLOGIST PROVIDED HISTORY: chest pain Reason for Exam: upright FINDINGS: Portable study is limited by obesity. Mild right basilar opacity. Lungs are otherwise grossly clear.   No pneumothorax or pleural fluid. Heart size is within normal limits. No acute bone finding. Right basilar opacity may represent airspace disease or superimposed shadows on this portable study obtained on an obese patient. When the patient is able, a follow-up PA and lateral examination of the chest would be helpful. VL Lower Extremity Bilateral Venous Duplex    Result Date: 10/2/2021    [facility]  Vascular Lower Extremities DVT Study Procedure   Patient Name    [facility]        Date of Study             10/02/2021                  Libra Gavin.   Date of Birth   1965   Gender                    Male   Age             63 year(s)   Race                         Room Number     2012   Corporate ID #  [id number]   Patient Acct #  [de-identified]   MR #            [id number]       Sonographer               Guevara Amaya   Accession #     [id number]   Interpreting Physician    Jone Blum   Referring Nurse              Referring Physician       Lieutenant Horne  Practitioner  Procedure Type of Study:   Veins: Lower Extremities DVT Study, Venous Scan Lower Bilateral.  Indications for Study:Edema. Patient Status: In Patient. Technical Quality:Limited visualization. Conclusions   Summary   Simultaneous real time imaging utilizing B-Mode, color doppler and  spectral waveform analysis was performed on the bilateral lower  extremities for venous examination of the deep and superficial systems. Findings are:   Right:  No evidence of deep or superficial venous thrombosis. Left:  No evidence of deep or superficial venous thrombosis.    Signature   ----------------------------------------------------------------  Electronically signed by Guevara Amaya(Sonographer) on  10/02/2021 09:08 AM  ----------------------------------------------------------------   ----------------------------------------------------------------  Electronically signed by Ezra Dick(Interpreting  physician) on 10/02/2021 10:46 AM  ----------------------------------------------------------------  Findings:   Right Impression:                    Left Impression:  The common femoral, femoral,         The common femoral, femoral,  popliteal and tibial veins           popliteal and tibial veins  demonstrate normal compressibility   demonstrate normal compressibility  and augmentation. and augmentation. Normal compressibility of the great  Normal compressibility of the great  saphenous vein. saphenous vein. Normal compressibility of the small  Normal compressibility of the small  saphenous vein. saphenous vein. Allergies   - Allergy:*Unlisted(Drug). Comments:Levaquin, NSAIDS, Lorazepam Velocities are measured in cm/s ; Diameters are measured in cm Right Lower Extremities DVT Study Measurements Right 2D Measurements +------------------------------------+----------+---------------+----------+ ! Location                            ! Visualized! Compressibility! Thrombosis! +------------------------------------+----------+---------------+----------+ ! Common Femoral                      !Yes       ! Yes            ! None      ! +------------------------------------+----------+---------------+----------+ ! Prox Femoral                        !Yes       ! Yes            ! None      ! +------------------------------------+----------+---------------+----------+ ! Mid Femoral                         !Yes       ! Yes            ! None      ! +------------------------------------+----------+---------------+----------+ ! Dist Femoral                        !Yes       ! Yes            ! None      ! +------------------------------------+----------+---------------+----------+ ! Deep Femoral                        !No        !               !          ! +------------------------------------+----------+---------------+----------+ ! Popliteal                           !Yes       ! Yes            ! None      ! +------------------------------------+----------+---------------+----------+ ! Sapheno Femoral Junction            ! Yes       ! Yes            ! None      ! +------------------------------------+----------+---------------+----------+ ! PTV                                 ! Partial   !Yes            ! None      ! +------------------------------------+----------+---------------+----------+ ! Peroneal                            !No        !               !          ! +------------------------------------+----------+---------------+----------+ ! Gastroc                             ! Partial   !Yes            ! None      ! +------------------------------------+----------+---------------+----------+ ! GSV Thigh                           ! Yes       ! Yes            ! None      ! +------------------------------------+----------+---------------+----------+ ! GSV Knee                            ! Yes       ! Yes            ! None      ! +------------------------------------+----------+---------------+----------+ ! GSV Ankle                           ! Yes       ! Yes            ! None      ! +------------------------------------+----------+---------------+----------+ ! SSV                                 ! Partial   !Yes            ! None      ! +------------------------------------+----------+---------------+----------+ Right Doppler Measurements +---------------------------+------+------+--------------------------------+ ! Location                   ! Signal!Reflux! Reflux (msec)                   ! +---------------------------+------+------+--------------------------------+ ! Common Femoral             !Phasic!      !                                ! +---------------------------+------+------+--------------------------------+ ! Prox Femoral               !Phasic!      !                                ! +---------------------------+------+------+--------------------------------+ ! Popliteal                  !Phasic!      ! ! +---------------------------+------+------+--------------------------------+ Left Lower Extremities DVT Study Measurements Left 2D Measurements +------------------------------------+----------+---------------+----------+ ! Location                            ! Visualized! Compressibility! Thrombosis! +------------------------------------+----------+---------------+----------+ ! Common Femoral                      !Partial   !Yes            ! None      ! +------------------------------------+----------+---------------+----------+ ! Prox Femoral                        !Yes       ! Yes            ! None      ! +------------------------------------+----------+---------------+----------+ ! Mid Femoral                         !Yes       ! Yes            ! None      ! +------------------------------------+----------+---------------+----------+ ! Dist Femoral                        !Yes       ! Yes            ! None      ! +------------------------------------+----------+---------------+----------+ ! Deep Femoral                        !No        !               !          ! +------------------------------------+----------+---------------+----------+ ! Popliteal                           !Yes       ! Yes            ! None      ! +------------------------------------+----------+---------------+----------+ ! Sapheno Femoral Junction            ! Yes       ! Yes            ! None      ! +------------------------------------+----------+---------------+----------+ ! PTV                                 ! Partial   !Yes            ! None      ! +------------------------------------+----------+---------------+----------+ ! Peroneal                            !No        !               !          ! +------------------------------------+----------+---------------+----------+ ! Gastroc                             ! Partial   !Yes            ! None      ! +------------------------------------+----------+---------------+----------+ ! GSV Thigh !Yes       !Yes            ! None      ! +------------------------------------+----------+---------------+----------+ ! GSV Knee                            ! Yes       ! Yes            ! None      ! +------------------------------------+----------+---------------+----------+ ! GSV Ankle                           ! Yes       ! Yes            ! None      ! +------------------------------------+----------+---------------+----------+ ! SSV                                 ! Partial   !Yes            ! None      ! +------------------------------------+----------+---------------+----------+ Left Doppler Measurements +---------------------------+------+------+--------------------------------+ ! Location                   ! Signal!Reflux! Reflux (msec)                   ! +---------------------------+------+------+--------------------------------+ ! Common Femoral             !Phasic!      !                                ! +---------------------------+------+------+--------------------------------+ ! Prox Femoral               !Phasic!      !                                ! +---------------------------+------+------+--------------------------------+ ! Popliteal                  !Phasic!      !                                ! +---------------------------+------+------+--------------------------------+    Consultations:    Consults:     Final Specialist Recommendations/Findings:   IP CONSULT TO PULMONOLOGY  IP CONSULT TO NEPHROLOGY      The patient was seen and examined on day of discharge.     Discharge plan:     Disposition: Home    Physician Follow Up:     Steven Anand MD  [address] [facility]  [address] [facility]  [phone]    Schedule an appointment as soon as possible for a visit in 4 weeks  As needed, If symptoms worsen, for CPAP machine/ sleep study    Anaya Hendrickson MD  [address] [place] [address]  [phone]    In 1 week  As needed, If symptoms worsen, for reported black, tarry stools    Nida MAKI EVERY 6 HOURS AS NEEDED FOR WHEEZING OR FOR SHORTNESS OF BREATH     allopurinol 100 MG tablet  Commonly known as: Zyloprim  Take 1 tablet by mouth daily FOR GOUT. STOP IT IF ANY RASH DEVELOPS!     aspirin 81 MG EC tablet     blood glucose test strips  Test 3 times a day & as needed for symptoms of irregular blood glucose. Dispense sufficient amount for indicated testing frequency plus additional to accommodate PRN testing needs. One touch Ultra blue     bumetanide 1 MG tablet  Commonly known as: BUMEX  TAKE THREE TABLETS BY MOUTH TWICE A DAY     butalbital-acetaminophen-caffeine -40 MG per tablet  Commonly known as: FIORICET, ESGIC  Take 1 tablet by mouth every 8 hours as needed for Headaches     clobetasol 0.05 % ointment  Commonly known as: TEMOVATE  Apply topically 2 times daily for psoriasis     clopidogrel 75 MG tablet  Commonly known as: PLAVIX  Take 1 tablet by mouth daily     docusate sodium 100 MG capsule  Commonly known as: Colace  Take 1 capsule by mouth 2 times daily For constipation     escitalopram 10 MG tablet  Commonly known as: Lexapro  Take 1 tablet by mouth daily     fluticasone 0.05 % cream  Commonly known as: CUTIVATE     fluticasone-salmeterol 230-21 MCG/ACT inhaler  Commonly known as: Advair HFA  Inhale 2 puffs into the lungs 2 times daily     * FreeStyle Lianne 2 Sensor Misc  Patient to apply a new sensor every 14 days. RX to cover voucher patient will bring in. * FreeStyle Lianne 14 Day Sensor Misc  USE AS DIRECTED AND CHANGE EVERY 14 DAYS     gabapentin 300 MG capsule  Commonly known as: Neurontin  Take 1 po bid     Gauze Pads & Dressings 4\"X4\" Pads  Twice a day dressing of right leg wound     Handicap Placard Misc  by Does not apply route Can't walk greater than 200 feet. Expires in 5 years.      Insulin Syringe-Needle U-100 31G X 5/16\" 1 ML Misc  Use to subcutaneously inject insulin three times daily     Iron 325 (65 Fe) MG Tabs  Take 1 tablet by mouth daily     ketoconazole 2 % cream  Commonly known as: NIZORAL  Apply twice a day for yeast infection in the skin folds, for 4 weeks     Lancing Device Misc  Provide patient with lancing device appropriate for his machine/lancing needles. latanoprost 0.005 % ophthalmic solution  Commonly known as: XALATAN     lisinopril 5 MG tablet  Commonly known as: PRINIVIL;ZESTRIL  Take 1 tablet by mouth daily . Discontinued Lisinopril  10 mg     magnesium oxide 400 (240 Mg) MG tablet  Commonly known as: MAG-OX  Take 1 tablet by mouth daily     Melatonin 10 MG Tabs  Take 10 mg by mouth nightly as needed (insomnia)     metoprolol tartrate 50 MG tablet  Commonly known as: LOPRESSOR  Take 1 tablet by mouth 2 times daily     Nebulizer/Tubing/Mouthpiece Kit  Dx COPD needs nebulizer supplies     nitroGLYCERIN 0.4 MG SL tablet  Commonly known as: NITROSTAT  DISSOLVE 1 TAB UNDER TONGUE FOR CHEST PAIN - IF PAIN REMAINS AFTER 5 MIN, CALL 911 AND REPEAT DOSE. MAX 3 TABS IN 15 MINUTES     nystatin 790879 UNIT/GM powder  Commonly known as: MYCOSTATIN  Apply 2 times daily in the skin folds for several months     * ONE TOUCH ULTRASOFT LANCETS Misc  Patient to test blood sugar up to 4 times daily. * Lancets Misc  Use to check blood sugar three times daily along with when necessary due to symptoms. oxyCODONE HCl 10 MG immediate release tablet  Commonly known as: OXY-IR  Take 1 tablet by mouth every 8 hours as needed for Pain for up to 30 days. Oxygen Tubing Misc  by Does not apply route DX COPD.  chronic respiratory failure     pantoprazole 40 MG tablet  Commonly known as: PROTONIX  Take 1 tablet by mouth every morning (before breakfast)     Rolled Gauze Bandage 4\"x2.5yd Misc  For twice a day dressing     rosuvastatin 10 MG tablet  Commonly known as: Crestor  Take 1 tablet by mouth nightly Stop pravastatin     tiotropium 2.5 MCG/ACT Aers inhaler  Commonly known as: Spiriva Respimat  Inhale 2 puffs into the lungs daily     tiZANidine 4 MG tablet  Commonly known as: ZANAFLEX  TAKE ONE TABLET BY MOUTH EVERY 8 HOURS AS NEEDED FOR BACK PAIN     venlafaxine 75 MG extended release capsule  Commonly known as: EFFEXOR XR  Take 1 capsule by mouth daily Take with food     vitamin B-12 500 MCG tablet  Commonly known as: CYANOCOBALAMIN  Take 1 tablet by mouth daily     vitamin D3 25 MCG (1000 UT) Tabs tablet  Commonly known as: CHOLECALCIFEROL  Take 1 tablet by mouth daily         * This list has 6 medication(s) that are the same as other medications prescribed for you. Read the directions carefully, and ask your doctor or other care provider to review them with you. STOP taking these medications    spironolactone 50 MG tablet  Commonly known as: ALDACTONE           Where to Get Your Medications      These medications were sent to [address] [place] [address],[address]    Phone: [phone]   · epoetin leslie-epbx 3000 UNIT/ML Soln injection  · guaiFENesin 600 MG extended release tablet  · nicotine 21 MG/24HR  · predniSONE 5 MG tablet       Electronically signed by   Min Nugent MD  10/4/2021  5:46 PM    Thank you Dr. Marie Magaña MD for the opportunity to be involved in this patient's care.

## 2021-10-05 ENCOUNTER — CARE COORDINATION (OUTPATIENT)
Dept: CASE MANAGEMENT | Age: 56
End: 2021-10-05

## 2021-10-05 DIAGNOSIS — J96.21 ACUTE ON CHRONIC RESPIRATORY FAILURE WITH HYPOXIA (HCC): Primary | ICD-10-CM

## 2021-10-05 PROCEDURE — 1111F DSCHRG MED/CURRENT MED MERGE: CPT | Performed by: FAMILY MEDICINE

## 2021-10-05 NOTE — CARE COORDINATION
[facility] Initial Follow Up Call    Call within 2 business days of discharge: Yes    Patient: Ana Maria Jon. Patient : 1965   MRN: [id number]  Reason for Admission: NSTEMI  Discharge Date: 10/4/21 RARS: Readmission Risk Score: 55      Last Discharge [address]       Complaint Diagnosis Description Type Department Provider    21 Nasal Congestion; Cough; Rib Pain; Shortness of Breath NSTEMI (non-ST elevated myocardial infarction) ([facility] [address] . .. ED to Hosp-Admission (Discharged) (ADMITTED) Carmen Mitchell MD; Tracy Richardson. .. Spoke with: Ana Maria Jon. Facility: [facility]    Was able to contact Miguel for initial transitional outreach. He stated that he was still attempting to adjust being home. He said that he was weak and tired, but his breathing was better and is on 2 liter NC; no cough, fever/chills, chest pain, dizziness or nasal congestion. Reviewed medications and he stated that he has all his medications except the Retacrit and the Nicoderm patch. He said that the patches do not stick to him and was also not using the nystatin powder. 1111F order completed. No home care and he has follow up with PCP for tomorrow. Asked pt about the Retacrit and he said that he did not know about it. Informed Miguel, that writer would call his nephrologist and obtain answers. Call placed to [facility] and spoke with Whit Patten. She transferred writer to clinical staff, Jenni Long. Jenni Long said that she would message the provider and will get back to the pt. Transferred back to Whit Patten and she said that pt has seen Dr Sia Bustamante in the past and will call pt to schedule follow up. Call placed to [facility] and inquired about the Retacrit. Writer was informed that this medication needed a prior auth. Informed pharmacy to send prior authorization to [facility].     Non-face-to-face services provided:  Obtained and reviewed discharge summary and/or pt has been vaccinated. Education provided on COVID-19 vaccination as appropriate. Discussed exposure protocols and quarantine with CDC Guidelines. Patient was given an opportunity to verbalize any questions and concerns and agrees to contact CTN or health care provider for questions related to their healthcare. Reviewed and educated patient on any new and changed medications related to discharge diagnosis. Was patient discharged with a pulse oximeter? No Discussed and confirmed pulse oximeter discharge instructions and when to notify provider or seek emergency care. CTN provided contact information. Plan for follow-up call in 5-7 days based on severity of symptoms and risk factors.   Plan for next call: follow up        Care Transitions 24 Hour Call    Do you have any ongoing symptoms?: Yes  Patient-reported symptoms: Weakness, Fatigue  Do you have a copy of your discharge instructions?: Yes  Do you have all of your prescriptions and are they filled?: No  Have you been contacted by a [facility] Pharmacist?: No  Have you scheduled your follow up appointment?: Yes  How are you going to get to your appointment?: Car - family or friend to transport  Were you discharged with any Home Care or Post Acute Services: No  Do you feel like you have everything you need to keep you well at home?: Yes  Care Transitions Interventions         Follow Up  Future Appointments   Date Time Provider William Hernandez   10/14/2021  8:45 [facility]   10/19/2021  7:10 AM [facility]ICATION [facility]   10/21/2021  9:00 AM [facility]   11/9/2021  8:40 AM Tae Fish MD Neuro Spec [facility]   3/17/2022  8:00 AM Vaughn Boyer MD [facility]   3/22/2022 10:00 AM Jeannie Lane MD [address] [place] [address], RN

## 2021-10-06 ENCOUNTER — CARE COORDINATION (OUTPATIENT)
Dept: CASE MANAGEMENT | Age: 56
End: 2021-10-06

## 2021-10-06 ENCOUNTER — PATIENT MESSAGE (OUTPATIENT)
Dept: FAMILY MEDICINE CLINIC | Age: 56
End: 2021-10-06

## 2021-10-06 DIAGNOSIS — M96.1 POSTLAMINECTOMY SYNDROME OF LUMBAR REGION: ICD-10-CM

## 2021-10-06 DIAGNOSIS — G89.29 CHRONIC MIDLINE LOW BACK PAIN WITH LEFT-SIDED SCIATICA: ICD-10-CM

## 2021-10-06 DIAGNOSIS — M54.42 CHRONIC MIDLINE LOW BACK PAIN WITH LEFT-SIDED SCIATICA: ICD-10-CM

## 2021-10-06 DIAGNOSIS — M51.36 DDD (DEGENERATIVE DISC DISEASE), LUMBAR: ICD-10-CM

## 2021-10-06 DIAGNOSIS — M48.061 SPINAL STENOSIS OF LUMBAR REGION WITHOUT NEUROGENIC CLAUDICATION: ICD-10-CM

## 2021-10-06 RX ORDER — OXYCODONE HYDROCHLORIDE 10 MG/1
10 TABLET ORAL EVERY 8 HOURS PRN
Qty: 90 TABLET | Refills: 0 | Status: SHIPPED | OUTPATIENT
Start: 2021-10-06 | End: 2021-11-04 | Stop reason: SDUPTHER

## 2021-10-06 NOTE — CARE COORDINATION
[facility] Transitions Follow Up Call    10/6/2021    Patient: Jim Valenzuela Patient : 1965   MRN: [id number]  Reason for Admission: NSTEMI  Discharge Date: 10/4/21 RARS: Readmission Risk Score: 55         Spoke with: Jim Garcia. Was able to contact Miguel for transitional outreach  He said that he was just leaving the optometrist office. He said that he has not heard from the nephrologist office and has not heard about the Retacrit. Call placed to nephrologist office and spoke with [facility]. She stated that they are trying to get pt an appointment with Dr Willian Rutherford and that they will discuss the Retacrit injection with Miguel at that appointment. Miguel notified about call to nephrologist office   He had no questions or concern currently. Care Transitions Subsequent and Final Call    Subsequent and Final Calls  Care Transitions Interventions  Other Interventions:            Follow Up  Future Appointments   Date Time Provider William Hernandez   10/14/2021  8:45 AM [facility]   10/19/2021  7:10 AM [facility] MEDICATION MGMT [facility]   10/21/2021  9:00 AM [facility]   2021  8:40 AM Virgil Hoffmann MD Neuro Spec [facility]   3/17/2022  8:00 AM Raymond Ennis MD [facility]   3/22/2022 10:00 AM Patricia Peck MD [address] [place] [address],

## 2021-10-08 ENCOUNTER — HOSPITAL ENCOUNTER (OUTPATIENT)
Age: 56
Discharge: HOME | End: 2021-10-08
Payer: COMMERCIAL

## 2021-10-08 DIAGNOSIS — D64.9 ANEMIA, NORMOCYTIC NORMOCHROMIC: ICD-10-CM

## 2021-10-08 DIAGNOSIS — E66.9 DIABETES MELLITUS TYPE 2 IN OBESE (HCC): ICD-10-CM

## 2021-10-08 DIAGNOSIS — E11.69 DIABETES MELLITUS TYPE 2 IN OBESE (HCC): ICD-10-CM

## 2021-10-08 LAB
ABSOLUTE BANDS #: 0.22 K/UL (ref 0–1)
ABSOLUTE EOS #: 0.11 K/UL (ref 0–0.4)
ABSOLUTE IMMATURE GRANULOCYTE: ABNORMAL K/UL (ref 0–0.3)
ABSOLUTE LYMPH #: 1.1 K/UL (ref 1–4.8)
ABSOLUTE MONO #: 0.55 K/UL (ref 0.1–1.3)
ANION GAP SERPL CALCULATED.3IONS-SCNC: 13 MMOL/L (ref 9–17)
BANDS: 2 % (ref 0–10)
BASOPHILS # BLD: 0 % (ref 0–2)
BASOPHILS ABSOLUTE: 0 K/UL (ref 0–0.2)
BUN BLDV-MCNC: 55 MG/DL (ref 6–20)
BUN/CREAT BLD: ABNORMAL (ref 9–20)
CALCIUM SERPL-MCNC: 9.5 MG/DL (ref 8.6–10.4)
CHLORIDE BLD-SCNC: 98 MMOL/L (ref 98–107)
CO2: 32 MMOL/L (ref 20–31)
CREAT SERPL-MCNC: 1.74 MG/DL (ref 0.7–1.2)
DIFFERENTIAL TYPE: ABNORMAL
EOSINOPHILS RELATIVE PERCENT: 1 % (ref 0–4)
GFR AFRICAN AMERICAN: 49 ML/MIN
GFR NON-AFRICAN AMERICAN: 41 ML/MIN
GFR SERPL CREATININE-BSD FRML MDRD: ABNORMAL ML/MIN/{1.73_M2}
GFR SERPL CREATININE-BSD FRML MDRD: ABNORMAL ML/MIN/{1.73_M2}
GLUCOSE BLD-MCNC: 159 MG/DL (ref 70–99)
HCT VFR BLD CALC: 32.6 % (ref 41–53)
HEMOGLOBIN: 10.5 G/DL (ref 13.5–17.5)
IMMATURE GRANULOCYTES: ABNORMAL %
LYMPHOCYTES # BLD: 10 % (ref 24–44)
MCH RBC QN AUTO: 26.9 PG (ref 26–34)
MCHC RBC AUTO-ENTMCNC: 32.2 G/DL (ref 31–37)
MCV RBC AUTO: 83.6 FL (ref 80–100)
METAMYELOCYTES ABSOLUTE COUNT: 0.11 K/UL
METAMYELOCYTES: 1 %
MONOCYTES # BLD: 5 % (ref 1–7)
MORPHOLOGY: ABNORMAL
MORPHOLOGY: ABNORMAL
NRBC AUTOMATED: ABNORMAL PER 100 WBC
PDW BLD-RTO: 17.8 % (ref 11.5–14.9)
PLATELET # BLD: 285 K/UL (ref 150–450)
PLATELET ESTIMATE: ABNORMAL
PMV BLD AUTO: 8.5 FL (ref 6–12)
POTASSIUM SERPL-SCNC: 4.7 MMOL/L (ref 3.7–5.3)
RBC # BLD: 3.9 M/UL (ref 4.5–5.9)
RBC # BLD: ABNORMAL 10*6/UL
SEG NEUTROPHILS: 81 % (ref 36–66)
SEGMENTED NEUTROPHILS ABSOLUTE COUNT: 8.91 K/UL (ref 1.3–9.1)
SODIUM BLD-SCNC: 143 MMOL/L (ref 135–144)
WBC # BLD: 11 K/UL (ref 3.5–11)
WBC # BLD: ABNORMAL 10*3/UL

## 2021-10-08 PROCEDURE — 85025 COMPLETE CBC W/AUTO DIFF WBC: CPT

## 2021-10-08 PROCEDURE — 80048 BASIC METABOLIC PNL TOTAL CA: CPT

## 2021-10-08 PROCEDURE — 36415 COLL VENOUS BLD VENIPUNCTURE: CPT

## 2021-10-11 ENCOUNTER — TELEPHONE (OUTPATIENT)
Dept: PHARMACY | Age: 56
End: 2021-10-11

## 2021-10-11 NOTE — TELEPHONE ENCOUNTER
Called patient as I have a conflict with appt next week and need to change time/date for diabetes medication management. Upon review of Epic see patient was admitted 9/30 thru 10/4. Patient expresses still being very SOB and using O2 constantly. States on 4L currently. States he did reconnect with nephrologist (has appt tomorrow) and pulmonologist.   Patient reports having some hypoglycemia. Currently states he is sleeping from 8/9pm till 11am/12pm.  Not waking up to eat breakfast and not eating lunch when wakes up. Only eating supper each day and therefore only taking insulin with supper. Has been cutting down insulin dose to .35 due to hypoglycemia. Patient has kept freestyle CGM on. Patient states when wakes up about 11a/12p his blood sugar is usually in low 100's. Before supper last two days was 116 and 132 with only . 35 units given. Patient indicates did have BS of 56 earlier but currently BS is 108. Patient states he is very depressed and tired of being alone. Indicates he is not sure if he will come in for appt next week or not. Patient agreed to consider coming in but I will call patient back in 2-3 days to check on blood sugar and encourage to keep appt for next week. Patient indicates he has not smoked since hospital discharge. Encouraged him to continue avoiding smoking. Yesenia Rizvi RPH,Pharm. D,, BCPS, CACP  10/11/2021  4:44 PM

## 2021-10-12 ENCOUNTER — PATIENT MESSAGE (OUTPATIENT)
Dept: FAMILY MEDICINE CLINIC | Age: 56
End: 2021-10-12

## 2021-10-12 DIAGNOSIS — K12.2 INFECTION OF MOUTH: Primary | ICD-10-CM

## 2021-10-12 DIAGNOSIS — F41.9 ANXIETY: ICD-10-CM

## 2021-10-12 DIAGNOSIS — F33.2 MDD (MAJOR DEPRESSIVE DISORDER), RECURRENT SEVERE, WITHOUT PSYCHOSIS (HCC): ICD-10-CM

## 2021-10-12 RX ORDER — PENICILLIN V POTASSIUM 500 MG/1
500 TABLET ORAL EVERY 6 HOURS
Qty: 40 TABLET | Refills: 0 | Status: SHIPPED | OUTPATIENT
Start: 2021-10-12 | End: 2021-10-14 | Stop reason: ALTCHOICE

## 2021-10-12 NOTE — TELEPHONE ENCOUNTER
From: June Fadiaa.   To: Mary Gomez MD  Sent: 10/12/2021 2:30 PM EDT  Subject: Non-Urgent Medical Question    Can you please give me something for a toothache and to let you know that I have no teeth but the right side of my mouth is hurting and all infected please help

## 2021-10-13 ENCOUNTER — CARE COORDINATION (OUTPATIENT)
Dept: CASE MANAGEMENT | Age: 56
End: 2021-10-13

## 2021-10-13 RX ORDER — VENLAFAXINE HYDROCHLORIDE 75 MG/1
CAPSULE, EXTENDED RELEASE ORAL
Qty: 90 CAPSULE | Refills: 3 | Status: SHIPPED | OUTPATIENT
Start: 2021-10-13 | End: 2022-03-02 | Stop reason: SDUPTHER

## 2021-10-13 NOTE — CARE COORDINATION
reviewed discharge summary and/or continuity of care documents      Non-[facility] follow up appointment(s):     CTN provided contact information for future needs. Plan for follow-up call in 7-10 days based on severity of symptoms and risk factors. Plan for next call: follow up            Care Transitions Subsequent and Final Call    Subsequent and Final Calls  Do you have any ongoing symptoms?: Yes  Onset of Patient-reported symptoms: In the past 7 days  Patient-reported symptoms: Other  Interventions for patient-reported symptoms: Notified PCP/Physician  Have your medications changed?: No  Do you have any questions related to your medications?: No  Do you currently have any active services?: Yes  Are you currently active with any services?: Outpatient/Community Services  Do you have any needs or concerns that I can assist you with?: No  Care Transitions Interventions  Other Interventions:            Follow Up  Future Appointments   Date Time Provider William Hernandez   10/14/2021  8:45 AM [facility]   10/19/2021  7:10 AM [facility] MEDICATION MGMT [facility]   10/21/2021  9:00 AM [facility]   11/9/2021  8:40 AM Og Eagle MD Neuro Spec [facility]   3/17/2022  8:00 AM Cassius Cordero MD [facility]   3/22/2022 10:00 AM Codi Delaney MD [address] [place] [address],

## 2021-10-13 NOTE — PROGRESS NOTES
Visit Information    Have you changed or started any medications since your last visit including any over-the-counter medicines, vitamins, or herbal medicines? YES   Have you stopped taking any of your medications? Is so, why? -  no  Are you having any side effects from any of your medications? - no    Have you seen any other physician or provider since your last visit? yes -    Have you had any other diagnostic tests since your last visit? yes -    Have you been seen in the emergency room and/or had an admission in a hospital since we last saw you?  yes -    Have you had your routine dental cleaning in the past 6 months?  no     Do you have an active MyChart account? If no, what is the barrier?   Yes    Patient Care Team:  Vishnu Cole MD as PCP - General (Family Medicine)  Vishnu Cole MD as PCP - [facility] Provider  Leonor Rosales MD as Consulting Physician (Endocrinology)  Clara Louis DO as Consulting Physician (Cardiology)  Michael Riggs DPM as Surgeon (Podiatry)  Emely Valencia MD as Consulting Physician (Ophthalmology)  Jerry Xavier MD as Consulting Physician (Nephrology)  Tonia Ramirez MD as Consulting Physician (Pulmonology)  Lupe Treviño MD as Surgeon (Vascular Surgery)  Griselda Camejo MD as Consulting Physician (Gastroenterology)  Samanta Lara [facility] as Pharmacist (Pharmacist)  Jerod Santoro MD as Consulting Physician (Pulmonology)  Radonna Dandy, MD as Consulting Physician (Urology)  Sera Snider MD as Surgeon (Ophthalmology)  Abida Lang MD as Consulting Physician (Neurology)  Tonia Ramirez DO as Consulting Physician (Otolaryngology)  Griselda Camejo MD as Consulting Physician (Gastroenterology)  Sigifredo Clark RN as Care Transitions Nurse    Medical History Review  Past Medical, Family, and Social History reviewed and does contribute to the patient presenting condition    Health Maintenance   Topic Date Due    Shingles Vaccine (1 of 2) Never done    Diabetic retinal exam  10/24/2019    Diabetic foot exam  05/02/2020    Flu vaccine (1) 09/01/2021    A1C test (Diabetic or Prediabetic)  10/20/2021    Annual Wellness Visit (AWV)  02/24/2022    Lipid screen  09/12/2022    Potassium monitoring  10/08/2022    Creatinine monitoring  10/08/2022    Colon cancer screen colonoscopy  04/12/2024    DTaP/Tdap/Td vaccine (3 - Td or Tdap) 09/12/2028    Pneumococcal 0-64 years Vaccine (2 of 2 - PPSV23) 12/30/2029    Hepatitis B vaccine  Completed    COVID-19 Vaccine  Completed    Hepatitis C screen  Completed    HIV screen  Completed    Hepatitis A vaccine  Aged Out    Hib vaccine  Aged Out    Meningococcal (ACWY) vaccine  Aged Out

## 2021-10-13 NOTE — TELEPHONE ENCOUNTER
Please Approve or Refuse.   Send to Pharmacy per Pt's Request:  Next Visit Date:  10/14/2021   Last Visit Date: 9/30/2021    Hemoglobin A1C (%)   Date Value   07/20/2021 7.6   04/23/2021 8.0   01/20/2021 8.7 (H)             ( goal A1C is < 7)   BP Readings from Last 3 Encounters:   10/12/21 134/80   10/04/21 (!) 148/84   09/21/21 132/78          (goal 120/80)  BUN   Date Value Ref Range Status   10/08/2021 55 (H) 6 - 20 mg/dL Final     CREATININE   Date Value Ref Range Status   10/08/2021 1.74 (H) 0.70 - 1.20 mg/dL Final     Potassium   Date Value Ref Range Status   10/08/2021 4.7 3.7 - 5.3 mmol/L Final     Comment:     SPECIMEN MODERATELY HEMOLYZED, RESULTS MAY BE ADVERSELY AFFECTED

## 2021-10-14 ENCOUNTER — TELEPHONE (OUTPATIENT)
Dept: FAMILY MEDICINE CLINIC | Age: 56
End: 2021-10-14

## 2021-10-14 ENCOUNTER — OFFICE VISIT (OUTPATIENT)
Dept: FAMILY MEDICINE CLINIC | Age: 56
End: 2021-10-14
Payer: COMMERCIAL

## 2021-10-14 ENCOUNTER — TELEPHONE (OUTPATIENT)
Dept: PHARMACY | Age: 56
End: 2021-10-14

## 2021-10-14 VITALS
HEART RATE: 55 BPM | OXYGEN SATURATION: 98 % | DIASTOLIC BLOOD PRESSURE: 88 MMHG | BODY MASS INDEX: 42.66 KG/M2 | WEIGHT: 315 LBS | TEMPERATURE: 97.3 F | SYSTOLIC BLOOD PRESSURE: 138 MMHG | HEIGHT: 72 IN

## 2021-10-14 DIAGNOSIS — G89.29 CHRONIC BACK PAIN GREATER THAN 3 MONTHS DURATION: ICD-10-CM

## 2021-10-14 DIAGNOSIS — G47.33 OSA ON CPAP: ICD-10-CM

## 2021-10-14 DIAGNOSIS — H60.21 ACUTE MALIGNANT OTITIS EXTERNA OF RIGHT EAR: ICD-10-CM

## 2021-10-14 DIAGNOSIS — N18.30 BENIGN HYPERTENSIVE HEART AND CKD, STAGE 3 (GFR 30-59), W CHF (HCC): ICD-10-CM

## 2021-10-14 DIAGNOSIS — I50.32 CHRONIC DIASTOLIC CONGESTIVE HEART FAILURE (HCC): ICD-10-CM

## 2021-10-14 DIAGNOSIS — Z99.89 OSA ON CPAP: ICD-10-CM

## 2021-10-14 DIAGNOSIS — M96.1 POSTLAMINECTOMY SYNDROME OF LUMBAR REGION: ICD-10-CM

## 2021-10-14 DIAGNOSIS — E66.01 MORBID OBESITY WITH BMI OF 50.0-59.9, ADULT (HCC): ICD-10-CM

## 2021-10-14 DIAGNOSIS — E11.42 TYPE 2 DIABETES MELLITUS WITH DIABETIC POLYNEUROPATHY, WITH LONG-TERM CURRENT USE OF INSULIN (HCC): ICD-10-CM

## 2021-10-14 DIAGNOSIS — M54.9 CHRONIC BACK PAIN GREATER THAN 3 MONTHS DURATION: ICD-10-CM

## 2021-10-14 DIAGNOSIS — J44.9 CHRONIC OBSTRUCTIVE PULMONARY DISEASE, UNSPECIFIED COPD TYPE (HCC): Primary | ICD-10-CM

## 2021-10-14 DIAGNOSIS — Z79.4 TYPE 2 DIABETES MELLITUS WITH DIABETIC POLYNEUROPATHY, WITH LONG-TERM CURRENT USE OF INSULIN (HCC): ICD-10-CM

## 2021-10-14 DIAGNOSIS — L03.211 CELLULITIS OF EXTERNAL CHEEK, RIGHT: ICD-10-CM

## 2021-10-14 DIAGNOSIS — L85.3 DRY SKIN DERMATITIS: ICD-10-CM

## 2021-10-14 DIAGNOSIS — M48.061 SPINAL STENOSIS OF LUMBAR REGION WITHOUT NEUROGENIC CLAUDICATION: ICD-10-CM

## 2021-10-14 DIAGNOSIS — Z23 ENCOUNTER FOR IMMUNIZATION: ICD-10-CM

## 2021-10-14 DIAGNOSIS — J96.11 CHRONIC RESPIRATORY FAILURE WITH HYPOXIA (HCC): ICD-10-CM

## 2021-10-14 DIAGNOSIS — I13.0 BENIGN HYPERTENSIVE HEART AND CKD, STAGE 3 (GFR 30-59), W CHF (HCC): ICD-10-CM

## 2021-10-14 PROBLEM — H60.23: Status: RESOLVED | Noted: 2020-07-24 | Resolved: 2021-10-14

## 2021-10-14 PROBLEM — H60.399 CHRONIC INFECTIVE OTITIS EXTERNA: Status: RESOLVED | Noted: 2017-04-28 | Resolved: 2021-10-14

## 2021-10-14 PROBLEM — R56.9 SEIZURES (HCC): Status: RESOLVED | Noted: 2019-06-27 | Resolved: 2021-10-14

## 2021-10-14 PROBLEM — T14.8XXA EXCORIATION: Status: RESOLVED | Noted: 2021-02-23 | Resolved: 2021-10-14

## 2021-10-14 PROBLEM — B35.4 TINEA CORPORIS: Status: RESOLVED | Noted: 2021-02-01 | Resolved: 2021-10-14

## 2021-10-14 PROBLEM — D64.9 ANEMIA, NORMOCYTIC NORMOCHROMIC: Status: RESOLVED | Noted: 2021-09-12 | Resolved: 2021-10-14

## 2021-10-14 PROBLEM — I89.0 LYMPHEDEMA OF BOTH LOWER EXTREMITIES: Status: RESOLVED | Noted: 2021-04-27 | Resolved: 2021-10-14

## 2021-10-14 PROBLEM — I20.0 UNSTABLE ANGINA (HCC): Status: RESOLVED | Noted: 2021-09-11 | Resolved: 2021-10-14

## 2021-10-14 PROBLEM — J96.21 ACUTE ON CHRONIC RESPIRATORY FAILURE WITH HYPOXIA (HCC): Status: RESOLVED | Noted: 2021-09-30 | Resolved: 2021-10-14

## 2021-10-14 PROBLEM — Z53.20 COLON CANCER SCREENING DECLINED: Status: RESOLVED | Noted: 2021-02-23 | Resolved: 2021-10-14

## 2021-10-14 PROBLEM — R07.9 CHEST PAIN: Status: RESOLVED | Noted: 2021-09-11 | Resolved: 2021-10-14

## 2021-10-14 PROCEDURE — G8482 FLU IMMUNIZE ORDER/ADMIN: HCPCS | Performed by: FAMILY MEDICINE

## 2021-10-14 PROCEDURE — G0008 ADMIN INFLUENZA VIRUS VAC: HCPCS | Performed by: FAMILY MEDICINE

## 2021-10-14 PROCEDURE — 99215 OFFICE O/P EST HI 40 MIN: CPT | Performed by: FAMILY MEDICINE

## 2021-10-14 PROCEDURE — 1036F TOBACCO NON-USER: CPT | Performed by: FAMILY MEDICINE

## 2021-10-14 PROCEDURE — 1111F DSCHRG MED/CURRENT MED MERGE: CPT | Performed by: FAMILY MEDICINE

## 2021-10-14 PROCEDURE — 3023F SPIROM DOC REV: CPT | Performed by: FAMILY MEDICINE

## 2021-10-14 PROCEDURE — 2022F DILAT RTA XM EVC RTNOPTHY: CPT | Performed by: FAMILY MEDICINE

## 2021-10-14 PROCEDURE — 90674 CCIIV4 VAC NO PRSV 0.5 ML IM: CPT | Performed by: FAMILY MEDICINE

## 2021-10-14 PROCEDURE — 3017F COLORECTAL CA SCREEN DOC REV: CPT | Performed by: FAMILY MEDICINE

## 2021-10-14 PROCEDURE — G8926 SPIRO NO PERF OR DOC: HCPCS | Performed by: FAMILY MEDICINE

## 2021-10-14 PROCEDURE — 3051F HG A1C>EQUAL 7.0%<8.0%: CPT | Performed by: FAMILY MEDICINE

## 2021-10-14 PROCEDURE — G8417 CALC BMI ABV UP PARAM F/U: HCPCS | Performed by: FAMILY MEDICINE

## 2021-10-14 PROCEDURE — G8427 DOCREV CUR MEDS BY ELIG CLIN: HCPCS | Performed by: FAMILY MEDICINE

## 2021-10-14 RX ORDER — CEFUROXIME AXETIL 500 MG/1
500 TABLET ORAL 2 TIMES DAILY
Qty: 20 TABLET | Refills: 0 | Status: SHIPPED | OUTPATIENT
Start: 2021-10-14 | End: 2021-10-24

## 2021-10-14 RX ORDER — CIPROFLOXACIN 500 MG/1
500 TABLET, FILM COATED ORAL 2 TIMES DAILY
Qty: 20 TABLET | Refills: 0 | Status: SHIPPED | OUTPATIENT
Start: 2021-10-14 | End: 2021-10-24

## 2021-10-14 RX ORDER — AMMONIUM LACTATE 12 G/100G
LOTION TOPICAL
Qty: 222 ML | Refills: 0 | Status: SHIPPED | OUTPATIENT
Start: 2021-10-14 | End: 2022-10-23 | Stop reason: SDUPTHER

## 2021-10-14 RX ORDER — CIPROFLOXACIN AND DEXAMETHASONE 3; 1 MG/ML; MG/ML
4 SUSPENSION/ DROPS AURICULAR (OTIC) 2 TIMES DAILY
Qty: 7.5 ML | Refills: 0 | Status: SHIPPED | OUTPATIENT
Start: 2021-10-14 | End: 2021-10-21

## 2021-10-14 ASSESSMENT — ENCOUNTER SYMPTOMS
WHEEZING: 0
CHEST TIGHTNESS: 0
NAUSEA: 0
RHINORRHEA: 0
ABDOMINAL DISTENTION: 0
DIARRHEA: 0
FACIAL SWELLING: 1
SHORTNESS OF BREATH: 1
COUGH: 0
CONSTIPATION: 0
BACK PAIN: 1
ABDOMINAL PAIN: 1
VOMITING: 0

## 2021-10-14 NOTE — TELEPHONE ENCOUNTER
Patient returned call today. Patient states he has an infection in his ear/jaw and saw his PCP today. Was placed on Cipro and Cefuroxime. Keeping hearing aids out and states he is unable to eat due to jaw pain. Last few days has only been eating chicken broth and soggy crackers. Encouraged patient to get some meal replacement shakes such as Ensure or Boost. Patient states he has a list for his daughter to get from the store. Also encouraged product called PB2. Patient states has continued to take only one dose of insulin daily and that blood sugars have been running 140-170. No hypoglycemia since last spoke per patient. Discussed that we are pleased that does not have hypoglycemia but also important to control BS to help infection heal.  Patient will come to appt on 10/18 for more formal BS evaluation / management. Yesenia Rizvi RP,Pharm. D,, BCPS, CACP  10/14/2021  3:05 PM

## 2021-10-14 NOTE — PROGRESS NOTES
Post-Discharge Transitional Care Management Services or Hospital Follow Up      Miguel Lincoln. YOB: 1965    Date of Office Visit:  10/14/2021  Date of Hospital Admission: 9/30/21  Date of Hospital Discharge: 10/4/21  Readmission Risk Score(high >=14%.  Medium >=10%):Readmission Risk Score: 46      Care management risk score Rising risk (score 2-5) and Complex Care (Scores >=6): 8     Non face to face  following discharge, date last encounter closed (first attempt may have been earlier): 10/5/2021 10:13 AM 10/5/2021 10:13 AM    Call initiated 2 business days of discharge: Yes     Patient Active Problem List   Diagnosis    DDD (degenerative disc disease), lumbar    Cellulitis of external cheek, right    Hypertriglyceridemia    Anemia of chronic renal failure, stage 3b ([address]    CAD (coronary artery disease)    COPD (chronic obstructive pulmonary disease) ([address]    Type 2 diabetes mellitus with diabetic polyneuropathy, with long-term current use of insulin (HCC)    Chronic pancreatitis ([address]    Displacement of lumbar intervertebral disc without myelopathy    Chronic diastolic congestive heart failure ([address]    Insomnia    Diabetes mellitus type 2 in obese ([address]    BPH (benign prostatic hyperplasia)    Morbid obesity with BMI of 50.0-59.9, adult ([address]    Hepatic steatosis    History of rib fracture    Umbilical hernia    Adrenal gland anomaly    Spinal stenosis of lumbar region without neurogenic claudication    Chronic back pain greater than 3 months duration    Gastroesophageal reflux disease without esophagitis    Hyperlipidemia with target LDL less than 70    Lower urinary tract symptoms (LUTS)    Vitamin D deficiency    Iron deficiency anemia due to chronic blood loss    Chronic midline low back pain with left-sided sciatica    Stasis dermatitis of both legs    Anxiety    Intertrigo axillary b/l    History of recurrent ear infection    Mixed conductive and sensorineural hearing loss of both ears    Tinnitus of both ears    Slow transit constipation    Chronic infection of both external ears    Tinea pedis of both feet    Onychomycosis    Moderate episode of recurrent major depressive disorder (HCC)    Hydrocele, bilateral    BARBY on CPAP    Dry skin dermatitis    Celiac artery stenosis ([address]    Myopia    Nuclear nonsenile cataract    Presbyopia    Postlaminectomy syndrome of lumbar region    Venous insufficiency of left lower extremity    Bilateral hearing loss    Recurrent infection of skin    Vitamin B 12 deficiency    Mobility impaired    Chronic respiratory failure with hypoxia ([facility])    Hypomagnesemia    Psoriasis    Venous insufficiency of both lower extremities    PVD (peripheral vascular disease) ([facility])    Chronic gout due to renal impairment without tophus    LBBB (left bundle branch block)    Benign hypertensive heart and CKD, stage 3 (GFR 30-59), w CHF ([facility])       Allergies   Allergen Reactions    Levofloxacin Anaphylaxis     Patient reports needing epinephrine \"about 5 or 6 months ago\" for anaphylaxis (itching, hives, SOB/swelling) after receiving Levofloxacin. Previous report from 2012: Nausea/Vomiting    Lorazepam      Falls      Nsaids      CHF&CKD    Prozac [Fluoxetine Hcl] Other (See Comments)     Pt started with seizures after started taking.  Vancomycin Other (See Comments)     Itching, SOB, emesis upon infusion in ED 2019. Patient states he has had vancomycin \"a number of times\" before without issue. couldn't breath and talk, throat closed     Family History   Problem Relation Age of Onset    Heart Disease Mother          age 64 from MI    High Blood Pressure Mother     Diabetes Mother     High Blood Pressure Father          age 80 from CKD and Lung Fibrosis    Kidney Disease Father     Heart Disease Sister     Heart Attack Sister     Obesity Sister     Diabetes Sister Medications listed as ordered at the time of discharge from [facility]. Home Medication Instructions RADHA:    Printed on:10/14/21 0092   Medication Information                      albuterol (PROVENTIL) (2.5 MG/3ML) 0.083% nebulizer solution  Take 3 mLs by nebulization every 6 hours as needed for Wheezing or Shortness of Breath             allopurinol (ZYLOPRIM) 100 MG tablet  Take 1 tablet by mouth daily FOR GOUT. STOP IT IF ANY RASH DEVELOPS!             amitriptyline (ELAVIL) 50 MG tablet  Take 2 po qhs             ammonium lactate (LAC-HYDRIN) 12 % lotion  Apply topically daily. aspirin 81 MG EC tablet  Take 81 mg by mouth daily. blood glucose monitor strips  Test 3 times a day & as needed for symptoms of irregular blood glucose. Dispense sufficient amount for indicated testing frequency plus additional to accommodate PRN testing needs. One touch Ultra blue             bumetanide (BUMEX) 1 MG tablet  TAKE THREE TABLETS BY MOUTH TWICE A DAY             butalbital-acetaminophen-caffeine (FIORICET, ESGIC) -40 MG per tablet  Take 1 tablet by mouth every 8 hours as needed for Headaches             clobetasol (TEMOVATE) 0.05 % ointment  Apply topically 2 times daily for psoriasis             clopidogrel (PLAVIX) 75 MG tablet  Take 1 tablet by mouth daily             Continuous Blood Gluc Sensor (FREESTYLE CRISTINE 2 SENSOR) [facility]  Patient to apply a new sensor every 14 days.   RX to cover voucher patient will bring in.             docusate sodium (COLACE) 100 MG capsule  Take 1 capsule by mouth 2 times daily For constipation             epoetin leslie-epbx (RETACRIT) 3000 UNIT/ML SOLN injection  Inject 1 mL into the skin three times a week             escitalopram (LEXAPRO) 10 MG tablet  Take 1 tablet by mouth daily             Ferrous Sulfate (IRON) 325 (65 Fe) MG TABS  Take 1 tablet by mouth daily             fluticasone (CUTIVATE) 0.05 % cream  Apply topically 2 times daily              fluticasone (FLONASE) 50 MCG/ACT nasal spray  2 sprays by Nasal route daily             fluticasone-salmeterol (ADVAIR HFA) 230-21 MCG/ACT inhaler  Inhale 2 puffs into the lungs 2 times daily             gabapentin (NEURONTIN) 300 MG capsule  Take 1 po bid             Gauze Bandages (ROLLED GAUZE BANDAGE 4\"X2. 5YD) MISC  For twice a day dressing             Gauze Pads & Dressings 4\"X4\" PADS  Twice a day dressing of right leg wound             guaiFENesin (MUCINEX) 600 MG extended release tablet  Take 2 tablets by mouth 2 times daily for 30 doses             Handicap Placard MISC  by Does not apply route Can't walk greater than 200 feet. Expires in 5 years. insulin regular human (HUMULIN R) 500 UNIT/ML concentrated injection vial  Patient using 0.52ml with breakfast, 0.52ml with lunch and 0.45ml with dinner. Insulin Syringe-Needle U-100 31G X 5/16\" 1 ML MISC  Use to subcutaneously inject insulin three times daily             ketoconazole (NIZORAL) 2 % cream  Apply twice a day for yeast infection in the skin folds, for 4 weeks             Lancet Devices (LANCING DEVICE) MISC  Provide patient with lancing device appropriate for his machine/lancing needles. Lancets MISC  Use to check blood sugar three times daily along with when necessary due to symptoms. latanoprost (XALATAN) 0.005 % ophthalmic solution  1 drop nightly             lisinopril (PRINIVIL;ZESTRIL) 5 MG tablet  Take 1 tablet by mouth daily . Discontinued Lisinopril  10 mg             magnesium oxide (MAG-OX) 400 (240 Mg) MG tablet  Take 1 tablet by mouth daily             Melatonin 10 MG TABS  Take 10 mg by mouth nightly as needed (insomnia)             metoprolol tartrate (LOPRESSOR) 50 MG tablet  Take 1 tablet by mouth 2 times daily             nicotine (NICODERM CQ) 21 MG/24HR  Place 1 patch onto the skin daily             nitroGLYCERIN (NITROSTAT) 0.4 MG SL tablet  DISSOLVE 1 TAB UNDER TONGUE FOR CHEST PAIN - IF PAIN REMAINS AFTER 5 MIN, CALL 911 AND REPEAT DOSE. MAX 3 TABS IN 15 MINUTES             nystatin (MYCOSTATIN) 011130 UNIT/GM powder  Apply 2 times daily in the skin folds for several months             nystatin (MYCOSTATIN) 605622 UNIT/ML suspension  Take 5 mLs by mouth 4 times daily Swish and swallow             ONE TOUCH ULTRASOFT LANCETS MISC  Patient to test blood sugar up to 4 times daily. oxyCODONE HCl (OXY-IR) 10 MG immediate release tablet  Take 1 tablet by mouth every 8 hours as needed for Pain for up to 30 days. Oxygen Tubing MISC  by Does not apply route DX COPD.  chronic respiratory failure             pantoprazole (PROTONIX) 40 MG tablet  Take 1 tablet by mouth every morning (before breakfast)             penicillin v potassium (VEETID) 500 MG tablet  Take 1 tablet by mouth every 6 hours for 10 days             PROAIR  (90 Base) MCG/ACT inhaler  INHALE TWO PUFFS BY MOUTH EVERY 6 HOURS AS NEEDED FOR WHEEZING OR FOR SHORTNESS OF BREATH             Respiratory Therapy Supplies (NEBULIZER/TUBING/MOUTHPIECE) KIT  Dx COPD needs nebulizer supplies             ROCKLATAN 0.02-0.005 % SOLN               rosuvastatin (CRESTOR) 10 MG tablet  Take 1 tablet by mouth nightly Stop pravastatin             tiotropium (SPIRIVA RESPIMAT) 2.5 MCG/ACT AERS inhaler  Inhale 2 puffs into the lungs daily             venlafaxine (EFFEXOR XR) 75 MG extended release capsule  TAKE ONE CAPSULE BY MOUTH DAILY WITH FOOD             vitamin B-12 (CYANOCOBALAMIN) 500 MCG tablet  Take 1 tablet by mouth daily             vitamin D3 (CHOLECALCIFEROL) 25 MCG (1000 UT) TABS tablet  Take 1 tablet by mouth daily                   Medications marked \"taking\" at this time  Outpatient Medications Marked as Taking for the 10/14/21 encounter (Office Visit) with Ted Hernandez MD   Medication Sig Dispense Refill    venlafaxine (EFFEXOR XR) 75 MG extended release capsule TAKE ONE CAPSULE BY MOUTH DAILY WITH FOOD 90 capsule 3    ROCKLATAN 0.02-0.005 % SOLN       nystatin (MYCOSTATIN) 111739 UNIT/ML suspension Take 5 mLs by mouth 4 times daily Swish and swallow 240 mL 0    penicillin v potassium (VEETID) 500 MG tablet Take 1 tablet by mouth every 6 hours for 10 days 40 tablet 0    oxyCODONE HCl (OXY-IR) 10 MG immediate release tablet Take 1 tablet by mouth every 8 hours as needed for Pain for up to 30 days. 90 tablet 0    guaiFENesin (MUCINEX) 600 MG extended release tablet Take 2 tablets by mouth 2 times daily for 30 doses 60 tablet 0    epoetin leslie-epbx (RETACRIT) 3000 UNIT/ML SOLN injection Inject 1 mL into the skin three times a week 21.9 mL 0    nicotine (NICODERM CQ) 21 MG/24HR Place 1 patch onto the skin daily 30 patch 3    Insulin Syringe-Needle U-100 31G X 5/16\" 1 ML MISC Use to subcutaneously inject insulin three times daily 300 each 2    allopurinol (ZYLOPRIM) 100 MG tablet Take 1 tablet by mouth daily FOR GOUT. STOP IT IF ANY RASH DEVELOPS! 90 tablet 3    vitamin D3 (CHOLECALCIFEROL) 25 MCG (1000 UT) TABS tablet Take 1 tablet by mouth daily 90 tablet 1    docusate sodium (COLACE) 100 MG capsule Take 1 capsule by mouth 2 times daily For constipation 180 capsule 3    latanoprost (XALATAN) 0.005 % ophthalmic solution 1 drop nightly      gabapentin (NEURONTIN) 300 MG capsule Take 1 po bid 60 capsule 2    butalbital-acetaminophen-caffeine (FIORICET, ESGIC) -40 MG per tablet Take 1 tablet by mouth every 8 hours as needed for Headaches 60 tablet 1    clopidogrel (PLAVIX) 75 MG tablet Take 1 tablet by mouth daily 90 tablet 3    nitroGLYCERIN (NITROSTAT) 0.4 MG SL tablet DISSOLVE 1 TAB UNDER TONGUE FOR CHEST PAIN - IF PAIN REMAINS AFTER 5 MIN, CALL 911 AND REPEAT DOSE.  MAX 3 TABS IN 15 MINUTES 25 tablet 2    pantoprazole (PROTONIX) 40 MG tablet Take 1 tablet by mouth every morning (before breakfast) 90 tablet 1    insulin regular human (HUMULIN R) 500 UNIT/ML concentrated injection vial Patient using 0.52ml with breakfast, 0.52ml with lunch and 0.45ml with dinner. (Patient taking differently: Patient using 0.52ml with breakfast, 0.52ml with lunch and 0.40 ml with dinner.) 60 mL 3    escitalopram (LEXAPRO) 10 MG tablet Take 1 tablet by mouth daily 90 tablet 3    rosuvastatin (CRESTOR) 10 MG tablet Take 1 tablet by mouth nightly Stop pravastatin 90 tablet 3    Continuous Blood Gluc Sensor (FREESTYLE CRISTINE 2 SENSOR) [place] Patient to apply a new sensor every 14 days. RX to cover voucher patient will bring in. 2 each 0    Gauze Pads & Dressings 4\"X4\" PADS Twice a day dressing of right leg wound 60 each 5    Gauze Bandages (ROLLED GAUZE BANDAGE 4\"X2. 5YD) MISC For twice a day dressing 60 each 5    PROAIR  (90 Base) MCG/ACT inhaler INHALE TWO PUFFS BY MOUTH EVERY 6 HOURS AS NEEDED FOR WHEEZING OR FOR SHORTNESS OF BREATH 8.5 g 3    amitriptyline (ELAVIL) 50 MG tablet Take 2 po qhs (Patient taking differently: Take 50 mg by mouth nightly Take 1 po qhs) 60 tablet 5    nystatin (MYCOSTATIN) 878774 UNIT/GM powder Apply 2 times daily in the skin folds for several months 1 Bottle 3    bumetanide (BUMEX) 1 MG tablet TAKE THREE TABLETS BY MOUTH TWICE A  tablet 2    clobetasol (TEMOVATE) 0.05 % ointment Apply topically 2 times daily for psoriasis 1 Tube 3    ketoconazole (NIZORAL) 2 % cream Apply twice a day for yeast infection in the skin folds, for 4 weeks 1 Tube 3    albuterol (PROVENTIL) (2.5 MG/3ML) 0.083% nebulizer solution Take 3 mLs by nebulization every 6 hours as needed for Wheezing or Shortness of Breath 120 vial 3    Ferrous Sulfate (IRON) 325 (65 Fe) MG TABS Take 1 tablet by mouth daily 90 tablet 3    metoprolol tartrate (LOPRESSOR) 50 MG tablet Take 1 tablet by mouth 2 times daily 180 tablet 3    magnesium oxide (MAG-OX) 400 (240 Mg) MG tablet Take 1 tablet by mouth daily 90 tablet 3    lisinopril (PRINIVIL;ZESTRIL) 5 MG tablet Take 1 tablet by mouth daily . Discontinued Lisinopril  10 mg 90 tablet 3    fluticasone-salmeterol (ADVAIR HFA) 230-21 MCG/ACT inhaler Inhale 2 puffs into the lungs 2 times daily 12 g 11    tiotropium (SPIRIVA RESPIMAT) 2.5 MCG/ACT AERS inhaler Inhale 2 puffs into the lungs daily 12 g 11    blood glucose monitor strips Test 3 times a day & as needed for symptoms of irregular blood glucose. Dispense sufficient amount for indicated testing frequency plus additional to accommodate PRN testing needs. One touch Ultra blue 300 strip 0    Lancets MISC Use to check blood sugar three times daily along with when necessary due to symptoms. 300 each 2    vitamin B-12 (CYANOCOBALAMIN) 500 MCG tablet Take 1 tablet by mouth daily 90 tablet 3    Oxygen Tubing MISC by Does not apply route DX COPD. chronic respiratory failure 1 each 0    Respiratory Therapy Supplies (NEBULIZER/TUBING/MOUTHPIECE) KIT Dx COPD needs nebulizer supplies 1 kit 11    ONE TOUCH ULTRASOFT LANCETS MISC Patient to test blood sugar up to 4 times daily. 300 each 3    Lancet Devices (LANCING DEVICE) MISC Provide patient with lancing device appropriate for his machine/lancing needles. 1 each 1    Handicap Placard [facility] by Does not apply route Can't walk greater than 200 feet. Expires in 5 years. 1 each 0    fluticasone (CUTIVATE) 0.05 % cream Apply topically 2 times daily       fluticasone (FLONASE) 50 MCG/ACT nasal spray 2 sprays by Nasal route daily (Patient taking differently: 2 sprays by Nasal route daily as needed (sinus symptoms) ) 1 Bottle 3    Melatonin 10 MG TABS Take 10 mg by mouth nightly as needed (insomnia) 90 tablet 1    aspirin 81 MG EC tablet Take 81 mg by mouth daily.           Medications patient taking as of now reconciled against medications ordered at time of hospital discharge: Yes    Chief Complaint   Patient presents with    Follow-Up from Hospital     DOING SOMEWHAT BETTER/FEELS DIFFERENT    Other     [address] [place] [address]    Jaw Pain activities. Reports cough is mild. He denies chest pain or wheezing. He reports compliance with his inhalers. He does have upcoming appointment with pulmonologist in a few weeks, advised the daughter to make him an appointment earlier. Patient's daughter says they will do this. Patient complains of right face swollen and jaw pain, for 4 days, radiates into the right ear. Patient says initially the pain was in the right upper jaw. He contacted our office in a few days ago, and we started him on penicillin for the reported pain in the jaw which he thought is related to his teeth, but he does not have any teeth. He says penicillin is not working  He says since started on penicillin his right cheek is swollen, reddish and warm. He wears hearing aids in the right ear. He also admits to pain behind the right ear, in the bone. He denies fever, chills, night sweats. He has history of  recurrent external otitis media, and he says before got hearing aids, he was treated with ciprofloxacin oral.    diabetes mellitus type 2 with polyneuropathy, and hyperglycemia, with recent episodes of hypoglycemia. He self cut Humulin R 500 and he did call pharmacist.  Patient says that since discharge, he had 3-4 days in a row very low blood glucose, 32, 40's. He admits that he has not been eating much due to the right jaw pain. Patient has been keeping in touch with Yesenia, his pharmacy management, who helps him to adjust his insulin. This morning blood glucose was 124  Patient reports his home blood glucose now runs between 140-170. No more hypoglycemia since the insulin was adjusted. He has been taking only 1 dosage of insulin a day, 0.35 Units    A1c improved  Lab Results   Component Value Date    LABA1C 7.6 07/20/2021    LABA1C 8.0 04/23/2021    LABA1C 8.7 (H) 01/20/2021         Hypertension, CHF, CKD stage 3:    he  is not exercising and is adherent to low salt diet. Blood pressure is well controlled at home. Cardiac symptoms dyspnea, fatigue, lower extremity edema and orthopnea. Patient denies chest pain, chest pressure/discomfort, claudication, exertional chest pressure/discomfort, irregular heart beat, near-syncope, orthopnea, palpitations, paroxysmal nocturnal dyspnea and syncope. Cardiovascular risk factors: advanced age (older than 54 for men, 72 for women), diabetes mellitus, dyslipidemia, hypertension, male gender, obesity (BMI >= 30 kg/m2), sedentary lifestyle and smoking/ tobacco exposure. Use of agents associated with hypertension: none. History of target organ damage: angina/ prior myocardial infarction, chronic kidney disease, heart failure, peripheral artery disease and prior coronary revascularization. Has 1 stent    Saw cardiology 1 week ago for CHF and NSTEMI  Will start cardiac rehab  Cardiology didn't make any change in meds        blood pressure is borderline high today. BP Readings from Last 3 Encounters:   10/14/21 138/88   10/12/21 134/80   10/04/21 (!) 148/84        Pulse is Low. Pulse Readings from Last 3 Encounters:   10/14/21 55   10/12/21 85   10/04/21 66     Morbid obesity per BMI  Body mass index is 54.64 kg/m². Weight  is improved. Likely related to congestive heart failure and recent diuresis. He has lost 25 pounds in 3 months intentionally and unintentionally  Wt Readings from Last 3 Encounters:   10/14/21 (!) 402 lb 14.1 oz (182.7 kg)   10/12/21 (!) 399 lb 6.4 oz (181.2 kg)   10/03/21 (!) 399 lb 0.5 oz (181 kg)     07/20/21 (!) 427 lb (193.7 kg)     Sleep Apnea:  Current treatment: cPAP. Compliance: noncompliant: Unable to use the CPAP. Residual symptoms include: morning fatigue and daytime fatigue. Again strongly advised to make appointment earlier with Dr. Dominik Cameron for COPD, respiratory failure and sleep apnea, and to request a portable oxygen concentrator. He is requesting a hospital bed to be able to sleep elevated and more comfortable.   He cannot sleep at night due pantoprazole for stomach pain. Endocrine: Negative for cold intolerance, heat intolerance, polydipsia, polyphagia and polyuria. Musculoskeletal: Positive for arthralgias, back pain and gait problem. Comes in wheelchair, has difficulty getting up, needs support from person or assistive device to be able to take a few steps. Has walker and cane at home, and his daughters and wife have been helping him   Skin: Positive for rash. Bilateral stasis dermatitis and dry skin both legs   Allergic/Immunologic: Positive for immunocompromised state (due to diabetes). Neurological: Positive for numbness (feet, numbness and burning in his feet since 2000). Hematological: Bruises/bleeds easily. Psychiatric/Behavioral: Positive for decreased concentration, dysphoric mood and sleep disturbance. The patient is nervous/anxious. Vital signs significant for hypoxia, borderline high blood pressure, mild bradycardia, morbid obesity  Vitals:    10/14/21 0844 10/14/21 0912   BP: 138/88    Pulse: 55    Temp: 97.3 °F (36.3 °C)    SpO2: (!) 88% 98%   Weight: (!) 402 lb 14.1 oz (182.7 kg)    Height: 6' (1.829 m)      Body mass index is 54.64 kg/m². Wt Readings from Last 3 Encounters:   10/14/21 (!) 402 lb 14.1 oz (182.7 kg)   10/12/21 (!) 399 lb 6.4 oz (181.2 kg)   10/03/21 (!) 399 lb 0.5 oz (181 kg)     BP Readings from Last 3 Encounters:   10/14/21 138/88   10/12/21 134/80   10/04/21 (!) 148/84       Physical Exam  Vitals and nursing note reviewed. Constitutional:       General: He is not in acute distress. Appearance: Normal appearance. He is well-developed. He is obese. He is not diaphoretic. HENT:      Head: Normocephalic and atraumatic. Comments: Right ear tenderness to direct pressure on auriculum Right mastoid tenderness     Mouth/Throat:      Comments: I did not examine the mouth due to coronavirus pandemic and wearing masks. Eyes:      General: Lids are normal. No scleral icterus. Right eye: No discharge. Left eye: No discharge. Extraocular Movements: Extraocular movements intact. Conjunctiva/sclera: Conjunctivae normal.   Neck:      Thyroid: No thyromegaly. Cardiovascular:      Rate and Rhythm: Normal rate and regular rhythm. Heart sounds: Heart sounds are distant. No murmur heard. Pulmonary:      Effort: Pulmonary effort is normal. No respiratory distress. Breath sounds: Examination of the right-middle field reveals decreased breath sounds. Examination of the left-middle field reveals decreased breath sounds. Examination of the right-lower field reveals decreased breath sounds. Examination of the left-lower field reveals decreased breath sounds. Decreased breath sounds present. No wheezing or rales. Comments: Moderate decreased bilateral breath sounds symmetric, no crackles. Patient felt less short of breath with oxygen at 4 L/min per nasal cannula, and his pulse ox went up to 98%  Chest:      Chest wall: No tenderness. Abdominal:      General: Bowel sounds are normal. There is no distension. Palpations: Abdomen is soft. There is no hepatomegaly or splenomegaly. Tenderness: There is abdominal tenderness in the epigastric area. There is no guarding or rebound. Comments: Very obese abdomen   Musculoskeletal:         General: Tenderness present. Cervical back: Normal range of motion and neck supple. Lumbar back: Tenderness and bony tenderness present. Decreased range of motion. Positive right straight leg raise test and positive left straight leg raise test.      Right lower le+ Pitting Edema present. Left lower le+ Pitting Edema present. Comments: Declines physical therapy, he says he has his daughters and wife helping him. Lymphadenopathy:      Cervical: No cervical adenopathy. Skin:     General: Skin is warm and dry. Capillary Refill: Capillary refill takes less than 2 seconds.       Findings: Rash present. Comments: Significant bilateral stasis dermatitis, purple violaceous bilateral legs symmetric, with extensive dry skin. Advised Vaseline alternating with Lac-Hydrin. Right cheek is swollen, tender, redish, warm consistent with cellulitis of the right cheek   Neurological:      Mental Status: He is alert and oriented to person, place, and time. Gait: Gait abnormal.      Deep Tendon Reflexes: Reflexes are normal and symmetric. Comments: Decreased sensation bilateral feet. He had a very hard time getting out of the wheelchair, needed support from  his daughter   Psychiatric:         Mood and Affect: Mood is anxious. Behavior: Behavior normal.         Thought Content:  Thought content normal.         Judgment: Judgment normal.     Most recent results reviewed consistent with anemia of chronic disease, chronic kidney disease stage III stable GFR 41 on 10/8/2021  , hyperglycemia controlled   Borderline high CO2  High triglycerides  Otherwise labs within normal limits      Lab Results   Component Value Date    WBC 11.0 10/08/2021    HGB 10.5 (L) 10/08/2021    HCT 32.6 (L) 10/08/2021    MCV 83.6 10/08/2021     10/08/2021       Lab Results   Component Value Date     10/08/2021    K 4.7 10/08/2021    CL 98 10/08/2021    CO2 32 10/08/2021    BUN 55 10/08/2021    CREATININE 1.74 10/08/2021    GLUCOSE 159 10/08/2021    CALCIUM 9.5 10/08/2021        Lab Results   Component Value Date    ALT 23 09/30/2021    AST 14 09/30/2021    ALKPHOS 85 09/30/2021    BILITOT 0.76 09/30/2021       Lab Results   Component Value Date    TSH 2.26 01/20/2021       Lab Results   Component Value Date    CHOL 137 09/12/2021    CHOL 121 07/24/2021    CHOL 151 09/25/2020     Lab Results   Component Value Date    TRIG 416 (H) 09/12/2021    TRIG 213 (H) 07/24/2021    TRIG 180 (H) 09/25/2020     Lab Results   Component Value Date    HDL 30 (L) 09/12/2021    HDL 32 (L) 07/24/2021    HDL 33 (L) 09/25/2020     Lab Results   Component Value Date    LDLCHOLESTEROL      09/12/2021    LDLCHOLESTEROL 46 07/24/2021    LDLCHOLESTEROL 82 09/25/2020       Lab Results   Component Value Date    CHOLHDLRATIO 4.6 09/12/2021    CHOLHDLRATIO 3.8 07/24/2021    CHOLHDLRATIO 4.6 09/25/2020       Lab Results   Component Value Date    LABA1C 7.6 07/20/2021       Lab Results   Component Value Date    TXFYMEYL63 1096 07/24/2021       Lab Results   Component Value Date    FOLATE 14.9 07/24/2021       Lab Results   Component Value Date    IRON 72 10/02/2021    TIBC 266 10/02/2021    FERRITIN 669 (H) 10/02/2021       Lab Results   Component Value Date    VITD25 38.9 07/24/2021           Assessment/Plan:  1. Chronic obstructive pulmonary disease, unspecified COPD type ([facility]  Worsening  Continue Advair, Spiriva, oxygen at 4 L/min, albuterol as needed, and continue to abstain from smoking, recently quit smoking again  - [place] DISCHARGE MEDS RECONCILED W/ CURRENT OUTPATIENT MED LIST  - DME Order for Hospital Bed as OP  Follow-up with pulmonologist  2. Chronic respiratory failure with hypoxia (HCC)  Worsening  Now on continuous oxygen at 4 L/min, he does not have a portable container, I strongly advised him to make an appointment with pulmonologist, he does have a big tank at home, we did give him our tank while in the office. Advised that he could use that until he gets the portable tank  His daughter called his sister who has the appointment and she has called the pulmonologist, they put him on a waiting list and will give also the appointment made  Patient has been abstaining from smoking any type of cigarettes or marijuana, praise given  - [place] DISCHARGE MEDS RECONCILED W/ CURRENT OUTPATIENT MED LIST  - DME Order for Hospital Bed as OP    3.  Acute malignant otitis externa of right ear  Worsening despite taking penicillin  Will stop the penicillin and treat for malignant otitis externa as there is tenderness over the right mastoid, also add cefuroxime due to involvement of the right cheek with cellulitis  Advised to keep the hearing aid out of the right ear at all times  - [place] DISCHARGE MEDS RECONCILED W/ CURRENT OUTPATIENT MED LIST  - ciprofloxacin (CIPRO) 500 MG tablet; Take 1 tablet by mouth 2 times daily for 10 days Taken before, no reaction  Dispense: 20 tablet; Refill: 0  - DME Order for Hospital Bed as OP  - cefUROXime (CEFTIN) 500 MG tablet; Take 1 tablet by mouth 2 times daily for 10 days  Dispense: 20 tablet; Refill: 0  - CBC Auto Differential; Future  - ciprofloxacin-dexamethasone (CIPRODEX) 0.3-0.1 % otic suspension; Place 4 drops into the right ear 2 times daily for 7 days  Dispense: 7.5 mL; Refill: 0    Careful review of urgent symptoms and need for immediate medical attention if condition worsens. Patient expressed understanding. Advised to go to ED if severe symptoms develop. Patient and his daughter expressed understanding. 4. Type 2 diabetes mellitus with diabetic polyneuropathy, with long-term current use of insulin (HCC)  Improving    Lab Results   Component Value Date    LABA1C 7.6 07/20/2021    LABA1C 8.0 04/23/2021    LABA1C 8.7 (H) 01/20/2021       - Hemoglobin A1C; Future  - [place] DISCHARGE MEDS RECONCILED W/ CURRENT OUTPATIENT MED LIST  - DME Order for Hospital Bed as OP  Has been taking Humulin R  500 insulin only once a day 0.35     Continue self-monitoring multiple times a day  Advised to increase intake to avoid hypoglycemia  Needs to continue with pharmacy management  5.  Chronic diastolic congestive heart failure (HCC)  Improved recent acute on chronic CHF  Overall worsening congestive heart failure  Lab Results   Component Value Date    LVEF 46 03/26/2021    LVEFMODE Echo 08/29/2017   There is plan for him to start cardiac rehab per cardiologist  Continue current medications, self monitoring of the blood pressure, pulse and weight daily, follow-up with CHF clinic        - [place] DISCHARGE MEDS RECONCILED W/ CURRENT OUTPATIENT MED LIST  - DME disease, unspecified COPD type ([facility]    2. Chronic respiratory failure with hypoxia (HCC)    3. Acute malignant otitis externa of right ear    4. Type 2 diabetes mellitus with diabetic polyneuropathy, with long-term current use of insulin ([facility]    5. Chronic diastolic congestive heart failure (HCC)    6. Cellulitis of external cheek, right    7. Benign hypertensive heart and CKD, stage 3 (GFR 30-59), w CHF ([facility]    8. Morbid obesity with BMI of 50.0-59.9, adult ([facility]    9. Chronic back pain greater than 3 months duration    10. BARBY on CPAP    11. Postlaminectomy syndrome of lumbar region    12. Spinal stenosis of lumbar region without neurogenic claudication       Patient requires frequent and immediate positioning changes for relief of pain and care needs related to medical diagnoses. Patient needs variability of bed height requirements to perform patient transfers and for sleeping. Current body Weight: (!) 402 lb 14.1 oz (182.7 kg). The need for this equipment was discussed with the patient and he understands and is in agreement. 11. Postlaminectomy syndrome of lumbar region    - [place] DISCHARGE MEDS RECONCILED W/ CURRENT OUTPATIENT MED LIST  - DME Order for Hospital Bed as OP    12. Spinal stenosis of lumbar region without neurogenic claudication  Likely worsening  - [place] DISCHARGE MEDS RECONCILED W/ CURRENT OUTPATIENT MED LIST  - DME Order for Hospital Bed as OP    13. Dry skin dermatitis and stasis dermatitis bilateral legs  Alternate Vaseline with Lac-Hydrin  - [place] DISCHARGE MEDS RECONCILED W/ CURRENT OUTPATIENT MED LIST  - ammonium lactate (LAC-HYDRIN) 12 % lotion; Apply topically daily. Dispense: 222 mL; Refill: 0      14.  Encounter for immunization  - CO DISCHARGE MEDS RECONCILED W/ CURRENT OUTPATIENT MED LIST  - INFLUENZA, MDCK QUADV, 2 YRS AND OLDER, IM, PF, PREFILL SYR OR SDV, 0.5ML (FLUCELVAX QUADV, PF)        Medical Decision Making: moderate complexity      On this date 10/14/2021 I have spent  45 minutes reviewing previous notes, test results and face to face with the patient discussing the diagnosis and importance of compliance with the treatment plan as well as documenting on the day of the visit, and care coordination with his daughter.     Future Appointments   Date Time Provider William Hernandez   10/18/2021  7:10 AM [facility] MEDICATION MGMT [facility]   10/21/2021  9:00 AM Cherri Spurling, MD  sc [facility]   11/9/2021  8:40 AM Cristal Le MD Neuro Spec [facility]   3/17/2022  8:00 AM [facility] [place] [facility] Garner [facility]   3/22/2022 10:00 AM Dashawn Lemus MD [address] [place] [address]       Electronically signed by Cherri Spurling, MD on 10/14/21 at 10:35 PM EDT

## 2021-10-15 NOTE — TELEPHONE ENCOUNTER
Noted. Thank you!     Future Appointments   Date Time Provider William Hernandez   10/18/2021  7:10 AM [facility] MEDICATION MGMT [facility] MED [facility]   10/21/2021  9:00 AM Madison Hatchet, MD fp sc [facility]   11/9/2021  8:40 AM Dee Gomez MD Neuro Spec [facility]   3/17/2022  8:00 AM Anita Benitez MD SV Cancer Ct [facility]   3/22/2022 10:00 AM Jose Allred MD [address] [place] [address]

## 2021-10-18 ENCOUNTER — TELEPHONE (OUTPATIENT)
Dept: PHARMACY | Age: 56
End: 2021-10-18

## 2021-10-18 NOTE — TELEPHONE ENCOUNTER
Patient did not show up to our service for diabetes medication management this morning. Called to check on patient and reschedule but no answer. Left message for patient to call us back. Yesenia Rizvi [facility],Pharm. D,, BCPS, CACP  10/18/2021  9:36 AM

## 2021-10-19 ENCOUNTER — CARE COORDINATION (OUTPATIENT)
Dept: CASE MANAGEMENT | Age: 56
End: 2021-10-19

## 2021-10-19 ENCOUNTER — TELEPHONE (OUTPATIENT)
Dept: PHARMACY | Age: 56
End: 2021-10-19

## 2021-10-19 NOTE — CARE COORDINATION
[facility]    10/19/2021    Patient: Oleh Apley. Patient : 1965   MRN: [id number]  Reason for Admission: NSTEMI  Discharge Date: 10/4/21 RARS: Readmission Risk Score: 55         Spoke with: Oleh Apley. Was able to contact [facility] for transitional outreach. He stated that he just did not feel good. He said that his jaw is in pain, just like a tooth ache, but he has not teeth. He was started on antibiotics and ear gtts and he said that it helped just a little. Encourage him to call PCP if it does not resolve once antibiotics are finished. VU. He said that his blood sugars have been in the 140-170's and he noticed that he is sleeping a lot. The special order bed still has not come. He said that they are on back order. He had no further concerns or questions. Care Transitions Follow Up Call          Needs to be reviewed by the provider    Additional needs identified to be addressed with provider: No  none                 Method of communication with provider : none        Care Transition Nurse (CTN) contacted the patient by telephone to follow up after admission on 21. Verified name and  with patient as identifiers.     Addressed changes since last contact: none  Discussed follow-up appointments.      CTN reviewed discharge instructions, medical action plan and red flags with patient and discussed any barriers to care and/or understanding of plan of care after discharge. Discussed appropriate site of care based on symptoms and resources available to patient including: PCP, Specialist and When to call 911.  The patient agrees to contact the PCP office for questions related to their healthcare.      Patients top risk factors for readmission: lack of knowledge about disease and medical condition-DM/COPD/NSTEMI  Interventions to address risk factors: Obtained and reviewed discharge summary and/or continuity of care documents        Non-[facility] follow up appointment(s):      CTN provided contact information for future needs. Plan for follow-up call in 7-10 days based on severity of symptoms and risk factors. Plan for next call: follow up      Care Transitions Subsequent and Final Call    Subsequent and Final Calls  Do you have any ongoing symptoms?: No  Have your medications changed?: Yes  Patient Reports: Ceftin, Cipro po, Cipro ear gtt, Ammonium lactate  Do you have any questions related to your medications?: No  Do you currently have any active services?: Yes  Are you currently active with any services?: Outpatient/Community Services  Do you have any needs or concerns that I can assist you with?: No  Care Transitions Interventions  Other Interventions:            Follow Up  Future Appointments   Date Time Provider William Mary   10/21/2021  9:00 AM [facility]   11/1/2021 10:10 AM [facility] MEDICATION MGMT [facility] MED [facility]   11/9/2021  8:40 AM Sarah Jackson MD Neuro Spec [facility]   3/17/2022  8:00 AM [facility]iley [facility]   3/22/2022 10:00 AM Marthe Denver, MD [address] [place] [address], RN

## 2021-10-19 NOTE — TELEPHONE ENCOUNTER
Patient called back following message left for him and missed appt yesterday for diabetes medication management with [facility] Medication Management service. Patient indicates he was in a really bad place and did not want to talk to anyone and shut his phone off yesterday. Also forgot about appt. Patient apologizes for missing appointment but admits to not taking care of himself as just not caring. Considering moving out of home and into his car as wants to get away from family. Expressed concern about patient and ability to keep medications correctly if he was living in his car. Indicates he has not been taking his insulin for last few days and not eating regularly as indicated in previous call. Patient reports taking his antibiotics when he remembers. Discussed importance of taking antibiotics and insulin as instructed as well as not skipping meals. Understand that patient may not eat regularly and understand he has been adjusting his insulin dose as needed. Patient wants us to check in with him in a couple days as he says he will try to get himself together but making no promises. Patient requests seeing only myself as doesn't want to go thru issues with another person. As writer will be off next week appt tentatively scheduled with patient for Monday, November 1st.  Encouraged patient to call with any issues. Yesenia Rizvi RP,Pharm. D,, BCPS, CACP  10/19/2021  12:19 PM

## 2021-10-20 NOTE — PROGRESS NOTES
Shingles Vaccine (1 of 2) Never done    Diabetic retinal exam  10/24/2019    Diabetic foot exam  05/02/2020    A1C test (Diabetic or Prediabetic)  10/20/2021    Annual Wellness Visit (AWV)  02/24/2022    Lipid screen  09/12/2022    Potassium monitoring  10/08/2022    Creatinine monitoring  10/08/2022    Colon cancer screen colonoscopy  04/12/2024    DTaP/Tdap/Td vaccine (3 - Td or Tdap) 09/12/2028    Pneumococcal 0-64 years Vaccine (2 of 2 - PPSV23) 12/30/2029    Hepatitis B vaccine  Completed    Flu vaccine  Completed    COVID-19 Vaccine  Completed    Hepatitis C screen  Completed    HIV screen  Completed    Hepatitis A vaccine  Aged Out    Hib vaccine  Aged Out    Meningococcal (ACWY) vaccine  Aged Out

## 2021-10-21 ENCOUNTER — TELEPHONE (OUTPATIENT)
Dept: FAMILY MEDICINE CLINIC | Age: 56
End: 2021-10-21

## 2021-10-21 ENCOUNTER — TELEMEDICINE (OUTPATIENT)
Dept: FAMILY MEDICINE CLINIC | Age: 56
End: 2021-10-21
Payer: COMMERCIAL

## 2021-10-21 DIAGNOSIS — M96.1 POSTLAMINECTOMY SYNDROME OF LUMBAR REGION: ICD-10-CM

## 2021-10-21 DIAGNOSIS — H60.21 ACUTE MALIGNANT OTITIS EXTERNA OF RIGHT EAR: Primary | ICD-10-CM

## 2021-10-21 DIAGNOSIS — G89.29 CHRONIC BACK PAIN GREATER THAN 3 MONTHS DURATION: ICD-10-CM

## 2021-10-21 DIAGNOSIS — I13.0 BENIGN HYPERTENSIVE HEART AND CKD, STAGE 3 (GFR 30-59), W CHF (HCC): ICD-10-CM

## 2021-10-21 DIAGNOSIS — J44.9 CHRONIC OBSTRUCTIVE PULMONARY DISEASE, UNSPECIFIED COPD TYPE (HCC): ICD-10-CM

## 2021-10-21 DIAGNOSIS — N18.30 BENIGN HYPERTENSIVE HEART AND CKD, STAGE 3 (GFR 30-59), W CHF (HCC): ICD-10-CM

## 2021-10-21 DIAGNOSIS — I50.32 CHRONIC DIASTOLIC CONGESTIVE HEART FAILURE (HCC): Primary | ICD-10-CM

## 2021-10-21 DIAGNOSIS — J96.11 CHRONIC RESPIRATORY FAILURE WITH HYPOXIA (HCC): ICD-10-CM

## 2021-10-21 DIAGNOSIS — M48.061 SPINAL STENOSIS OF LUMBAR REGION WITHOUT NEUROGENIC CLAUDICATION: ICD-10-CM

## 2021-10-21 DIAGNOSIS — H60.21 ACUTE MALIGNANT OTITIS EXTERNA OF RIGHT EAR: ICD-10-CM

## 2021-10-21 DIAGNOSIS — M54.9 CHRONIC BACK PAIN GREATER THAN 3 MONTHS DURATION: ICD-10-CM

## 2021-10-21 DIAGNOSIS — Z79.4 TYPE 2 DIABETES MELLITUS WITH DIABETIC POLYNEUROPATHY, WITH LONG-TERM CURRENT USE OF INSULIN (HCC): ICD-10-CM

## 2021-10-21 DIAGNOSIS — E11.42 TYPE 2 DIABETES MELLITUS WITH DIABETIC POLYNEUROPATHY, WITH LONG-TERM CURRENT USE OF INSULIN (HCC): ICD-10-CM

## 2021-10-21 PROCEDURE — 3017F COLORECTAL CA SCREEN DOC REV: CPT | Performed by: FAMILY MEDICINE

## 2021-10-21 PROCEDURE — 1111F DSCHRG MED/CURRENT MED MERGE: CPT | Performed by: FAMILY MEDICINE

## 2021-10-21 PROCEDURE — 3051F HG A1C>EQUAL 7.0%<8.0%: CPT | Performed by: FAMILY MEDICINE

## 2021-10-21 PROCEDURE — G8427 DOCREV CUR MEDS BY ELIG CLIN: HCPCS | Performed by: FAMILY MEDICINE

## 2021-10-21 PROCEDURE — 99214 OFFICE O/P EST MOD 30 MIN: CPT | Performed by: FAMILY MEDICINE

## 2021-10-21 PROCEDURE — 2022F DILAT RTA XM EVC RTNOPTHY: CPT | Performed by: FAMILY MEDICINE

## 2021-10-21 RX ORDER — NEOMYCIN SULFATE, POLYMYXIN B SULFATE AND HYDROCORTISONE 10; 3.5; 1 MG/ML; MG/ML; [USP'U]/ML
3 SUSPENSION/ DROPS AURICULAR (OTIC) 4 TIMES DAILY
Qty: 10 ML | Refills: 0 | Status: SHIPPED | OUTPATIENT
Start: 2021-10-21 | End: 2021-12-16 | Stop reason: ALTCHOICE

## 2021-10-21 ASSESSMENT — ENCOUNTER SYMPTOMS
FACIAL SWELLING: 0
RHINORRHEA: 0
WHEEZING: 0
VOMITING: 0
ABDOMINAL DISTENTION: 0
SHORTNESS OF BREATH: 1
BACK PAIN: 1
CONSTIPATION: 0
COUGH: 1
CHEST TIGHTNESS: 0
NAUSEA: 0
DIARRHEA: 0

## 2021-10-21 NOTE — PROGRESS NOTES
10/21/2021    TELEHEALTH EVALUATION -- Audio/Visual (During YXXSB-59 public health emergency)      Alexander Corley. (:  1965) is a 63 y.o. male,Established patient, here for evaluation of the following chief complaint(s): COPD, Congestive Heart Failure, Diabetes, Hyperlipidemia, and Hypertension        ASSESSMENT/PLAN:    1. Chronic diastolic congestive heart failure (HCC)  Stable  Lab Results   Component Value Date    LVEF 46 2021    LVEFMODE Echo 2017   Cardiac rehab was not approved  Pending hospital bed which is on back order  Taking Metoprolol 50 mg twice a day, magnesium daily, lisinopril 5 mg daily,Bumex 3 tablets twice a day    Patient to do his labs to check electrolytes  Aldactone was stopped in the hospital      2. Benign hypertensive heart and CKD, stage 3 (GFR 30-59), w CHF ([address]  Borderline high blood pressure per most recent reading, when he was in pain due to malignant otitis  Stable chronic kidney disease stage III per most recent readings  Advised again that he needs to do his blood work ASAP  BP Readings from Last 3 Encounters:   10/14/21 138/88   10/12/21 134/80   10/04/21 (!) 148/84   continue  Bumex metoprolol 50 mg twice a day, magnesium daily, lisinopril 5 mg daily,Bumex 3 tablets twice a day    We might need to decrease the dosage of Bumex    3. Acute malignant otitis externa of right ear  Improving  Cellulitis of the right cheek has been improving as well  To complete the full course of cefuroxime and ciprofloxacin for a total of 10 days  He never received the eardrops, I sent Cortisporin after the visit as Ciprodex was not covered  Advised to avoid hearing aid in the right ear until the infection is fully resolved and ENT clears him  To schedule appointment with ENT     4.  Chronic obstructive pulmonary disease, unspecified COPD type ([address]  Improving since he has stopped smoking  To continue Advair, Spiriva, oxygen at 2.5 L/min now (was on 4 L/min on 10/14/2021), nebulizer albuterol every 6 hours as needed, continue to abstain from smoking cigarettes and THC    5. Chronic respiratory failure with hypoxia (HCC)  Improving  Continue oxygen at 2 L/min  To follow-up with pulmonologist as scheduled  Pending hospital bed which he would benefit from    6. Type 2 diabetes mellitus with diabetic polyneuropathy, with long-term current use of insulin (HCC)  Improving  Lab Results   Component Value Date    LABA1C 7.6 07/20/2021    LABA1C 8.0 04/23/2021    LABA1C 8.7 (H) 01/20/2021   Due to improved compliance with his diet, he requires less insulin  He benefits from continuing pharmacy management of his diabetes due to fluctuating blood glucose and recent hypoglycemic events  Pending hemoglobin A1c  Has been taking Humulin R 500 only once a day, 0.35, he denies having any hypoglycemia since the last appointment  Continue self-monitoring multiple times a day    7. Chronic back pain greater than 3 months duration  Improved with medications continue oxycodone IR 10 mg 3 times a day as needed, gabapentin 300 mg twice a day, recently started by neurologist, amitriptyline 50 mg at night, stretching, repositioning, topical creams  Denies side effects from medications  He is agreeable to start physical therapy  -     [address]  8. Postlaminectomy syndrome of lumbar region  -     [address]  9. Spinal stenosis of lumbar region without neurogenic claudication  Likely worsening  -     [address]      Patient was strongly advised to do his blood work nonfasting, and physical therapy.     To call weekly the medical equipment company for the hospital bed which is on back order  To continue to abstain from smoking            Controlled Substance Monitoring:    Acute and Chronic Pain Monitoring:   RX Monitoring 10/21/2021   Attestation -   Periodic Controlled Substance Monitoring Possible medication side effects, risk of tolerance/dependence & alternative treatments discussed. ;No signs of potential drug abuse or diversion identified. ;Assessed functional status. Chronic Pain > 50 MEDD -   Chronic Pain > 80 MEDD -         Miguel received counseling on the following healthy behaviors: nutrition, exercise, medication adherence, tobacco and marijuana abstinence, and continued weight loss. Reviewed prior labs and health maintenance  Discussed use, benefit, and side effects of prescribed medications. Barriers to medication compliance addressed. Patient given educational materials - see patient instructions  All patient questions answered. Patient voiced understanding. The patient's past medical,surgical, social, and family history as well as his current medications and allergies were reviewed as documented in today's encounter. Medications, labs, diagnostic studies, consultations and follow-up as documented in this encounter. Return in about 18 weeks (around 2022) for ALWAYS NEEDS 30 MIN. APPT, **POC A1C, MEDICARE, VISION, PHQ9, MINICOG, HRA QUESTIONS. Data Unavailable    Future Appointments   Date Time Provider William Hernandez   2021 10:10 AM [facility] MEDICATION MGMT [facility]   2021  8:40 AM Bety Montanez MD Neuro Spec [facility]   3/2/2022  8:30 AM Jessenia Hope MD fp sc [facility]   3/17/2022  8:00 AM Ray Yeung MD [facility]   3/22/2022 10:00 AM MD Chilo Herrera URO [facility]         SUBJECTIVE/OBJECTIVE:  Saul Castro. (:  1965) has requested an audio/video evaluation for the following concern(s):COPD, Congestive Heart Failure, Diabetes, Hyperlipidemia, and Hypertension      Hypertension, chronic kidney disease stage III, coronary artery disease, has 1 stent, congestive heart failure  He says he was not approved for rehab  Patient reports dyspnea on exertion, fatigue, orthopnea. Leg swelling is much improved.   He denies chest pain or tablet Take 1 tablet by mouth every 8 hours as needed for Headaches Yes Modesto Vences MD   clopidogrel (PLAVIX) 75 MG tablet Take 1 tablet by mouth daily Yes Wendy Shell MD   nitroGLYCERIN (NITROSTAT) 0.4 MG SL tablet DISSOLVE 1 TAB UNDER TONGUE FOR CHEST PAIN - IF PAIN REMAINS AFTER 5 MIN, CALL 911 AND REPEAT DOSE. MAX 3 TABS IN 15 MINUTES Yes Wendy Shell MD   pantoprazole (PROTONIX) 40 MG tablet Take 1 tablet by mouth every morning (before breakfast) Yes Wendy Shell MD   insulin regular human (HUMULIN R) 500 UNIT/ML concentrated injection vial Patient using 0.52ml with breakfast, 0.52ml with lunch and 0.45ml with dinner. Patient taking differently: Patient using 0.52ml with breakfast, 0.52ml with lunch and 0.40 ml with dinner. Yes Wendy Shell MD   escitalopram (LEXAPRO) 10 MG tablet Take 1 tablet by mouth daily Yes Wendy Shell MD   rosuvastatin (CRESTOR) 10 MG tablet Take 1 tablet by mouth nightly Stop pravastatin Yes Wendy Shell MD   Continuous Blood Gluc Sensor (FREESTYLE CRISTINE 2 SENSOR) [facility] [place] [place] Patient to apply a new sensor every 14 days. RX to cover voucher patient will bring in. Yes Wendy Shell MD   Gauze Pads & Dressings 4\"X4\" PADS Twice a day dressing of right leg wound Yes Wendy Shell MD   Gauze Bandages (ROLLED GAUZE BANDAGE 4\"X2. 5YD) MISC For twice a day dressing Yes Wendy Shell MD   PROAIR  (90 Base) MCG/ACT inhaler INHALE TWO PUFFS BY MOUTH EVERY 6 HOURS AS NEEDED FOR WHEEZING OR FOR SHORTNESS OF BREATH Yes Wendy Shell MD   amitriptyline (ELAVIL) 50 MG tablet Take 2 po qhs  Patient taking differently: Take 50 mg by mouth nightly Take 1 po qhs Yes Modesto Vences MD   nystatin (MYCOSTATIN) 071828 UNIT/GM powder Apply 2 times daily in the skin folds for several months Yes Wendy Shell MD   bumetanide (BUMEX) 1 MG tablet TAKE THREE TABLETS BY MOUTH TWICE A DAY Yes Wendy Shell MD clobetasol (TEMOVATE) 0.05 % ointment Apply topically 2 times daily for psoriasis Yes Awa Hernandes MD   ketoconazole (NIZORAL) 2 % cream Apply twice a day for yeast infection in the skin folds, for 4 weeks Yes Awa Hernandes MD   albuterol (PROVENTIL) (2.5 MG/3ML) 0.083% nebulizer solution Take 3 mLs by nebulization every 6 hours as needed for Wheezing or Shortness of Breath Yes Awa Hernandes MD   Ferrous Sulfate (IRON) 325 (65 Fe) MG TABS Take 1 tablet by mouth daily Yes Awa Hernandes MD   metoprolol tartrate (LOPRESSOR) 50 MG tablet Take 1 tablet by mouth 2 times daily Yes Awa Hernandes MD   magnesium oxide (MAG-OX) 400 (240 Mg) MG tablet Take 1 tablet by mouth daily Yes Awa Hernandes MD   lisinopril (PRINIVIL;ZESTRIL) 5 MG tablet Take 1 tablet by mouth daily . Discontinued Lisinopril  10 mg Yes Awa Hernandes MD   fluticasone-salmeterol (ADVAIR HFA) 230-21 MCG/ACT inhaler Inhale 2 puffs into the lungs 2 times daily Yes Awa Hernandes MD   tiotropium (SPIRIVA RESPIMAT) 2.5 MCG/ACT AERS inhaler Inhale 2 puffs into the lungs daily Yes Awa Hernandes MD   blood glucose monitor strips Test 3 times a day & as needed for symptoms of irregular blood glucose. Dispense sufficient amount for indicated testing frequency plus additional to accommodate PRN testing needs. One touch Ultra blue Yes Venus Holguin MD   Lancets MISC Use to check blood sugar three times daily along with when necessary due to symptoms. Yes Venus Holguin MD   vitamin B-12 (CYANOCOBALAMIN) 500 MCG tablet Take 1 tablet by mouth daily Yes Awa Hernandes MD   Oxygen Tubing MISC by Does not apply route DX COPD. chronic respiratory failure Yes Awa Hernandes MD   Respiratory Therapy Supplies (NEBULIZER/TUBING/MOUTHPIECE) KIT Dx COPD needs nebulizer supplies Yes Awa Hernandes MD   ONE TOUCH ULTRASOFT LANCETS [address] [facility] Patient to test blood sugar up to 4 times daily.  Yes Venus Holguin MD Lancet Devices (LANCING DEVICE) MISC Provide patient with lancing device appropriate for his machine/lancing needles. Yes Stephanie Bain MD   Handicap Placard MISC by Does not apply route Can't walk greater than 200 feet. Expires in 5 years. Yes Stephanie Bain MD   fluticasone (CUTIVATE) 0.05 % cream Apply topically 2 times daily  Yes Historical Provider, MD   fluticasone (FLONASE) 50 MCG/ACT nasal spray 2 sprays by Nasal route daily  Patient taking differently: 2 sprays by Nasal route daily as needed (sinus symptoms)  Yes Paige Bryant MD   Melatonin 10 MG TABS Take 10 mg by mouth nightly as needed (insomnia) Yes Stephanie Bain MD   aspirin 81 MG EC tablet Take 81 mg by mouth daily.  Yes Historical Provider, MD       Social History     Tobacco Use    Smoking status: Former Smoker     Packs/day: 0.25     Years: 33.00     Pack years: 8.25     Types: Cigarettes     Start date: 1985     Quit date: 2020     Years since quittin.9    Smokeless tobacco: Former User     Types: Snuff     Quit date: 1995   Vaping Use    Vaping Use: Never used   Substance Use Topics    Alcohol use: No     Alcohol/week: 0.0 standard drinks    Drug use: Not Currently     Types: Marijuana     Comment: Patient reports he quit using THC for 26 days on 2021          PHYSICAL EXAMINATION:    Vital Signs: (As obtained by patient/caregiver or practitioner observation)  -vital signs stable and within normal limits except morbid obesity per BMI, BMI 54.64 kg/M2  Patient-Reported Vitals 10/20/2021   Patient-Reported Weight 402 pounds   Patient-Reported Height 6 feet   Patient-Reported Systolic -   Patient-Reported Diastolic -           Intensity of pain is: 6 out of 10      Constitutional: [x] Appears well-developed and well-nourished [x] No apparent distress      [x] Abnormal-obese      Mental status  [x] Alert and awake  [x] Oriented to person/place/time [x]Able to follow commands      Eyes:  EOM    [x] Normal  [] Abnormal-  Sclera  [x]  Normal  [] Abnormal -         Discharge [x]  None visible  [] Abnormal -    HENT:   [x] Normocephalic, atraumatic. [] Abnormal   [x] Mouth/Throat: Mucous membranes are moist.     External Ears [x] Normal  [x] Abnormal-  he had to put his hearing aid into the right ear during the encounter for him to understand me    Neck: [x] No visualized mass     Pulmonary/Chest: [x] Respiratory effort normal.  [x] No visualized signs of difficulty breathing or respiratory distress        [x] Abnormal on oxygen per nasal cannula at 2.5 L/min       Musculoskeletal:   [] Normal gait with no signs of ataxia         [x] Normal range of motion of neck        [x] Abnormal-decreased range of motion from the lumbar spine, reproducible pain when changing position and starting to walk, antalgic walking noted      Neurological:        [x] No Facial Asymmetry (Cranial nerve 7 motor function) (limited exam to video visit)          [x] No gaze palsy        [] Abnormal-            Skin:        [x] No significant exanthematous lesions or discoloration noted on facial skin         [] Abnormal-            Psychiatric:      [x] No Hallucinations       []Mood is normal      [x]Behavior is normal      [x]Judgment is normal      [x]Thought content is normal       [x] Abnormal-anxious but smiling  Other pertinent observable physical exam findings- none    Due to this being a TeleHealth encounter, evaluation of the following organ systems is limited: Vitals/Constitutional/EENT/Resp/CV/GI//MS/Neuro/Skin/Heme-Lymph-Imm. I personally reviewed most recent labs reviewed with the patient and all questions fully answered.    Stable chronic kidney disease stage III  Hyperglycemia  Anemia  Hypertriglyceridemia    Otherwise labs within normal limits        Lab Results   Component Value Date    WBC 11.0 10/08/2021    HGB 10.5 (L) 10/08/2021    HCT 32.6 (L) 10/08/2021    MCV 83.6 10/08/2021     10/08/2021       Lab Results   Component Value Date     10/08/2021    K 4.7 10/08/2021    CL 98 10/08/2021    CO2 32 10/08/2021    BUN 55 10/08/2021    CREATININE 1.74 10/08/2021    GLUCOSE 159 10/08/2021    CALCIUM 9.5 10/08/2021        Lab Results   Component Value Date    ALT 23 09/30/2021    AST 14 09/30/2021    ALKPHOS 85 09/30/2021    BILITOT 0.76 09/30/2021       Lab Results   Component Value Date    TSH 2.26 01/20/2021       Lab Results   Component Value Date    CHOL 137 09/12/2021    CHOL 121 07/24/2021    CHOL 151 09/25/2020     Lab Results   Component Value Date    TRIG 416 (H) 09/12/2021    TRIG 213 (H) 07/24/2021    TRIG 180 (H) 09/25/2020     Lab Results   Component Value Date    HDL 30 (L) 09/12/2021    HDL 32 (L) 07/24/2021    HDL 33 (L) 09/25/2020     Lab Results   Component Value Date    LDLCHOLESTEROL      09/12/2021    LDLCHOLESTEROL 46 07/24/2021    LDLCHOLESTEROL 82 09/25/2020       Lab Results   Component Value Date    CHOLHDLRATIO 4.6 09/12/2021    CHOLHDLRATIO 3.8 07/24/2021    CHOLHDLRATIO 4.6 09/25/2020       Lab Results   Component Value Date    LABA1C 7.6 07/20/2021    LABA1C 8.0 04/23/2021    LABA1C 8.7 (H) 01/20/2021         Lab Results   Component Value Date    GQEHPKYR29 1096 07/24/2021       Lab Results   Component Value Date    VITD25 38.9 07/24/2021       No orders of the defined types were placed in this encounter. Orders Placed This Encounter   Procedures    [facility] [address] [facility]     Referral Priority:   Routine     Referral Type:   Eval and Treat     Referral Reason:   Specialty Services Required     Requested Specialty:   Physical Therapy     Number of Visits Requested:   1       Medications Discontinued During This Encounter   Medication Reason    escitalopram (LEXAPRO) 10 MG tablet Alternate therapy              Miguel Fishman., was evaluated through a synchronous (real-time) audio-video encounter.  The patient (or guardian if applicable) is aware that this is a billable service. Verbal consent to proceed has been obtained within the past 12 months. The visit was conducted pursuant to the emergency declaration under the [address] [place] [address] and the [place] Act. Patient identification was verified    Patient was alone   The patient was located in a state where the provider was credentialed to provide care. On this date 10/21/2021 I have spent 35 minutes reviewing previous notes, test results and face to face with the patient discussing the diagnosis and importance of compliance with the treatment plan as well as documenting on the day of the visit. --José Luis Gannon MD on 10/23/2021 at 9:59 AM    An electronic signature was used to authenticate this note.

## 2021-10-21 NOTE — TELEPHONE ENCOUNTER
Please let the patient know patient did not read the message, I sent the new prescription for eardrops to the pharmacy

## 2021-10-21 NOTE — TELEPHONE ENCOUNTER
Spoke to [facility] and they stated they are no longer able to get ciprofloxacin-dexamethasone (CIPRODEX) 0.3-0.1 % otic suspension will need something else instead

## 2021-10-21 NOTE — TELEPHONE ENCOUNTER
If patient does not read the message please let him know that I sent a new prescription to the pharmacy

## 2021-10-23 ENCOUNTER — PATIENT MESSAGE (OUTPATIENT)
Dept: FAMILY MEDICINE CLINIC | Age: 56
End: 2021-10-23

## 2021-10-23 ENCOUNTER — HOSPITAL ENCOUNTER (OUTPATIENT)
Age: 56
Discharge: HOME | End: 2021-10-23
Payer: COMMERCIAL

## 2021-10-23 ENCOUNTER — TELEPHONE (OUTPATIENT)
Dept: FAMILY MEDICINE CLINIC | Age: 56
End: 2021-10-23

## 2021-10-23 DIAGNOSIS — H60.21 ACUTE MALIGNANT OTITIS EXTERNA OF RIGHT EAR: ICD-10-CM

## 2021-10-23 DIAGNOSIS — I13.0 BENIGN HYPERTENSIVE HEART AND CKD, STAGE 3 (GFR 30-59), W CHF (HCC): Primary | ICD-10-CM

## 2021-10-23 DIAGNOSIS — D63.8 ANEMIA, CHRONIC DISEASE: ICD-10-CM

## 2021-10-23 DIAGNOSIS — N18.30 BENIGN HYPERTENSIVE HEART AND CKD, STAGE 3 (GFR 30-59), W CHF (HCC): ICD-10-CM

## 2021-10-23 DIAGNOSIS — E11.42 TYPE 2 DIABETES MELLITUS WITH DIABETIC POLYNEUROPATHY, WITH LONG-TERM CURRENT USE OF INSULIN (HCC): ICD-10-CM

## 2021-10-23 DIAGNOSIS — I13.0 BENIGN HYPERTENSIVE HEART AND CKD, STAGE 3 (GFR 30-59), W CHF (HCC): ICD-10-CM

## 2021-10-23 DIAGNOSIS — Z79.4 TYPE 2 DIABETES MELLITUS WITH DIABETIC POLYNEUROPATHY, WITH LONG-TERM CURRENT USE OF INSULIN (HCC): ICD-10-CM

## 2021-10-23 DIAGNOSIS — I50.32 CHRONIC DIASTOLIC CONGESTIVE HEART FAILURE (HCC): ICD-10-CM

## 2021-10-23 DIAGNOSIS — I10 ESSENTIAL HYPERTENSION: ICD-10-CM

## 2021-10-23 DIAGNOSIS — N17.9 ACUTE RENAL FAILURE SUPERIMPOSED ON STAGE 3B CHRONIC KIDNEY DISEASE, UNSPECIFIED ACUTE RENAL FAILURE TYPE (HCC): ICD-10-CM

## 2021-10-23 DIAGNOSIS — N18.32 ACUTE RENAL FAILURE SUPERIMPOSED ON STAGE 3B CHRONIC KIDNEY DISEASE, UNSPECIFIED ACUTE RENAL FAILURE TYPE (HCC): ICD-10-CM

## 2021-10-23 DIAGNOSIS — N18.30 BENIGN HYPERTENSIVE HEART AND CKD, STAGE 3 (GFR 30-59), W CHF (HCC): Primary | ICD-10-CM

## 2021-10-23 DIAGNOSIS — L03.211 CELLULITIS OF EXTERNAL CHEEK, RIGHT: ICD-10-CM

## 2021-10-23 LAB
ABSOLUTE EOS #: 0.3 K/UL (ref 0–0.4)
ABSOLUTE IMMATURE GRANULOCYTE: ABNORMAL K/UL (ref 0–0.3)
ABSOLUTE LYMPH #: 0.9 K/UL (ref 1–4.8)
ABSOLUTE MONO #: 0.6 K/UL (ref 0.1–1.3)
ALBUMIN SERPL-MCNC: 3.9 G/DL (ref 3.5–5.2)
ALBUMIN/GLOBULIN RATIO: ABNORMAL (ref 1–2.5)
ALP BLD-CCNC: 80 U/L (ref 40–129)
ALT SERPL-CCNC: 13 U/L (ref 5–41)
ANION GAP SERPL CALCULATED.3IONS-SCNC: 12 MMOL/L (ref 9–17)
AST SERPL-CCNC: 6 U/L
BASOPHILS # BLD: 1 % (ref 0–2)
BASOPHILS ABSOLUTE: 0 K/UL (ref 0–0.2)
BILIRUB SERPL-MCNC: 0.22 MG/DL (ref 0.3–1.2)
BNP INTERPRETATION: NORMAL
BUN BLDV-MCNC: 90 MG/DL (ref 6–20)
BUN/CREAT BLD: ABNORMAL (ref 9–20)
CALCIUM SERPL-MCNC: 9.8 MG/DL (ref 8.6–10.4)
CHLORIDE BLD-SCNC: 98 MMOL/L (ref 98–107)
CO2: 29 MMOL/L (ref 20–31)
CREAT SERPL-MCNC: 2.61 MG/DL (ref 0.7–1.2)
DIFFERENTIAL TYPE: ABNORMAL
EOSINOPHILS RELATIVE PERCENT: 4 % (ref 0–4)
GFR AFRICAN AMERICAN: 31 ML/MIN
GFR NON-AFRICAN AMERICAN: 26 ML/MIN
GFR SERPL CREATININE-BSD FRML MDRD: ABNORMAL ML/MIN/{1.73_M2}
GFR SERPL CREATININE-BSD FRML MDRD: ABNORMAL ML/MIN/{1.73_M2}
GLUCOSE BLD-MCNC: 132 MG/DL (ref 70–99)
HCT VFR BLD CALC: 28.8 % (ref 41–53)
HEMOGLOBIN: 9.6 G/DL (ref 13.5–17.5)
IMMATURE GRANULOCYTES: ABNORMAL %
LYMPHOCYTES # BLD: 12 % (ref 24–44)
MAGNESIUM: 2.3 MG/DL (ref 1.6–2.6)
MCH RBC QN AUTO: 27.5 PG (ref 26–34)
MCHC RBC AUTO-ENTMCNC: 33.4 G/DL (ref 31–37)
MCV RBC AUTO: 82.4 FL (ref 80–100)
MONOCYTES # BLD: 8 % (ref 1–7)
NRBC AUTOMATED: ABNORMAL PER 100 WBC
PDW BLD-RTO: 17.8 % (ref 11.5–14.9)
PHOSPHORUS: 5.4 MG/DL (ref 2.5–4.5)
PLATELET # BLD: 153 K/UL (ref 150–450)
PLATELET ESTIMATE: ABNORMAL
PMV BLD AUTO: 8 FL (ref 6–12)
POTASSIUM SERPL-SCNC: 5.2 MMOL/L (ref 3.7–5.3)
PRO-BNP: 248 PG/ML
RBC # BLD: 3.5 M/UL (ref 4.5–5.9)
RBC # BLD: ABNORMAL 10*6/UL
SEG NEUTROPHILS: 75 % (ref 36–66)
SEGMENTED NEUTROPHILS ABSOLUTE COUNT: 5.7 K/UL (ref 1.3–9.1)
SODIUM BLD-SCNC: 139 MMOL/L (ref 135–144)
TOTAL PROTEIN: 7.1 G/DL (ref 6.4–8.3)
WBC # BLD: 7.6 K/UL (ref 3.5–11)
WBC # BLD: ABNORMAL 10*3/UL

## 2021-10-23 PROCEDURE — 83036 HEMOGLOBIN GLYCOSYLATED A1C: CPT

## 2021-10-23 PROCEDURE — 84100 ASSAY OF PHOSPHORUS: CPT

## 2021-10-23 PROCEDURE — 80053 COMPREHEN METABOLIC PANEL: CPT

## 2021-10-23 PROCEDURE — 85025 COMPLETE CBC W/AUTO DIFF WBC: CPT

## 2021-10-23 PROCEDURE — 83880 ASSAY OF NATRIURETIC PEPTIDE: CPT

## 2021-10-23 PROCEDURE — 36415 COLL VENOUS BLD VENIPUNCTURE: CPT

## 2021-10-23 PROCEDURE — 83735 ASSAY OF MAGNESIUM: CPT

## 2021-10-23 RX ORDER — BUMETANIDE 1 MG/1
1 TABLET ORAL 2 TIMES DAILY
Qty: 180 TABLET | Refills: 0
Start: 2021-10-23 | End: 2022-07-11 | Stop reason: SDUPTHER

## 2021-10-23 ASSESSMENT — ENCOUNTER SYMPTOMS: ABDOMINAL PAIN: 1

## 2021-10-23 NOTE — RESULT ENCOUNTER NOTE
Noted MyChart message sent  Worsening chronic kidney disease, will cut down the dosage of Bumex  Worsening anemia, he needs to restart Retacrit and follow-up with specialist nephrology/hematology oncology and GI  Repeat blood work on Monday, will place new orders    Future Appointments  11/1/2021  10:10 AM   [facility] MEDICATION MGMT       ST MED MGMT        [facility]  11/9/2021  8:40 AM    Cristal Le MD Neuro Spec          [facility]  3/2/2022   8:30 AM    Cherri Spurling, MD     fp sc               [facility]  3/17/2022  8:00 AM    Janet Melvin MD     [facility]        [facility]  3/22/2022  10:00 AM   Dashawn Lemus MD        [address] [place] [address]

## 2021-10-23 NOTE — TELEPHONE ENCOUNTER
From: Cherri Spurling, MD  To: Mando Connors. Sent: 10/23/2021 1:01 PM EDT  Subject: Vidhya Dolan,  Blood work came, most of the results are in. Resolved exacerbation of congestive heart failure which is good  There are 2 issues    1. Worsening kidney failure-we need to help your kidneys hydrate better, we will decrease the dosage of Bumex. I have in your chart that you are taking Bumex 1 mg, 3 tablets twice a day, PLEASE DECREASE BUMEX TO 1 MG, 1 TAB TWICE A DAY ONLY. 2. Worsening anemia  For anemia, I believe you have stopped Retacrit  Please restart taking it or let me know if you need refill. I would also suggest you to see GI for the stomach pain and Dr. Ame Falcon for anemia likely related to worsening kidney disease    epoetin leslie-epbx (RETACRIT) 3000 UNIT/ML SOLN injection 21.9 mL 0 10/4/2021   Sig - Route: Inject 1 mL into the skin three times a week - SubCUTAneous   Sent to pharmacy as: Epoetin Leslie-epbx 3000 UNIT/ML Injection Solution (RETACRIT)         3.REPEAT basic metabolic profile and hemoglobin hematocrit and phosphorus and magnesium on Monday at lunchtime, new orders placed in the computer. You can go to any [facility] lab to have them done without the printed orders, as the orders are in the computer and they can see them. Please reply to this message to note that you acknowledge and you are going to make the changes    If you have any questions, please let me know.     Cherri Spurling, MD  [address] [facility]  [address] [address]  Dept: [phone]  Dept Fax: [phone]

## 2021-10-23 NOTE — TELEPHONE ENCOUNTER
MESSAGE READ  Last read by Jacky Carroll. at 1:02 PM on 10/23/2021.   I LVM to do blood work on Monday, cut down the Bumex, start the medication for anemia, can reply to my message if he has any questions or call on Monday

## 2021-10-24 LAB
ESTIMATED AVERAGE GLUCOSE: 174 MG/DL
HBA1C MFR BLD: 7.7 % (ref 4–6)

## 2021-10-24 NOTE — RESULT ENCOUNTER NOTE
Fozia comment sent to patient.  Stable diabetes and A1c    Future Appointments  11/1/2021  10:10 AM   [facility] MEDICATION MGMT       [facility] MED MGMT        [facility]  11/9/2021  8:40 AM    Toy Talley MD Neuro Spec          [facility]  3/2/2022   8:30 AM    José Luis Gannon MD     fp sc               [facility]  3/17/2022  8:00 AM    Bret Fraser MD     [facility]        [facility]  3/22/2022  10:00 AM   Rodriguez Yu MD        [address] [place] [address]

## 2021-10-26 ENCOUNTER — CLINICAL DOCUMENTATION (OUTPATIENT)
Dept: FAMILY MEDICINE CLINIC | Age: 56
End: 2021-10-26

## 2021-10-26 ENCOUNTER — CARE COORDINATION (OUTPATIENT)
Dept: CASE MANAGEMENT | Age: 56
End: 2021-10-26

## 2021-10-26 NOTE — CARE COORDINATION
[facility] Transitions Follow Up Call    10/26/2021    Patient: Jim Valenzuela Patient : 1965   MRN: [id number]  Reason for Admission: NSTEMI  Discharge Date: 10/4/21 RARS: Readmission Risk Score: 55         Spoke with: Jim Valenzuela Was able to contact [place] for transitional outreach. He stated that he was doing all right. He said that his jaw still hurts. He feels that it may be a bone fragment and would like to see a dentist. He said that in his jaw there is a prominence that he can feel, but he can not pull it out. He will call his  about who is in his network. He also said that he was told to decrease his Bumex dose because of his recent blood work. He was upset, saying that the numbers go up and down. Encouraged him to call nephrologist and discuss it with him. Miguel when on to describe the stressors. Inquired if he would like writer to ask for a referral for a phycologists/therapist, but he declined. No further questions or concerns. Care Transitions Follow Up Call             Needs to be reviewed by the provider     Additional needs identified to be addressed with provider: No  none                 Method of communication with provider : none        Care Transition Nurse (CTN) contacted the patient by telephone to follow up after admission on 21. Verified name and  with patient as identifiers.     Addressed changes since last contact: none  Discussed follow-up appointments.      CTN reviewed discharge instructions, medical action plan and red flags with patient and discussed any barriers to care and/or understanding of plan of care after discharge. Discussed appropriate site of care based on symptoms and resources available to patient including: PCP, Specialist and When to call 911.  The patient agrees to contact the PCP office for questions related to their healthcare.      Patients top risk factors for readmission: lack of knowledge about disease and medical condition-DM/COPD/NSTEMI  Interventions to address risk factors: Obtained and reviewed discharge summary and/or continuity of care documents        [facility](s):      CTN provided contact information for future needs. Plan for follow-up call in 7-10 days based on severity of symptoms and risk factors. Plan for next call: follow up      Care Transitions Subsequent and Final Call    Subsequent and Final Calls  Have your medications changed?: Yes  Patient Reports: decreased Bumex 1mg bid  Do you have any questions related to your medications?: No  Do you currently have any active services?: Yes  Are you currently active with any services?: Outpatient/Community Services  Do you have any needs or concerns that I can assist you with?: No  Care Transitions Interventions  Other Interventions:            Follow Up  Future Appointments   Date Time Provider William Hernandez   11/1/2021 10:10 AM [facility] MEDICATION MGMT [facility] MED MGMT [facility]   11/9/2021  8:40 AM Virgil Hoffmann MD Neuro Spec [facility]   3/2/2022  8:30 AM Anaya Hendrickson MD fp sc [facility]   3/17/2022  8:00 AM [facility]   3/22/2022 10:00 AM Patricia Peck MD [address] [place] [address], RN

## 2021-10-26 NOTE — PROGRESS NOTES
STARTED PRIOR [address] [facility] [place] FOR neomycin-polymyxin-hydrocortisone (CORTISPORIN) 3.5-50901-6 otic suspensioN

## 2021-10-27 RX ORDER — GABAPENTIN 300 MG/1
CAPSULE ORAL
Qty: 60 CAPSULE | Refills: 0 | Status: SHIPPED | OUTPATIENT
Start: 2021-10-27 | End: 2021-11-11 | Stop reason: SDUPTHER

## 2021-10-27 NOTE — TELEPHONE ENCOUNTER
Pharmacy requesting refill of gabpentin 300 mg BID.       Medication active on med list yes      Date of last Rx: 8/13/2021 with 2 refills verified by RUSLAN, GLORIA      Date of last appointment 8/13/21    Next Visit Date:  11/9/2021

## 2021-11-01 ENCOUNTER — HOSPITAL ENCOUNTER (OUTPATIENT)
Dept: PHARMACY | Age: 56
Setting detail: THERAPIES SERIES
Discharge: HOME | End: 2021-11-01
Payer: COMMERCIAL

## 2021-11-01 ENCOUNTER — TELEPHONE (OUTPATIENT)
Dept: PHARMACY | Age: 56
End: 2021-11-01

## 2021-11-01 PROCEDURE — 99213 OFFICE O/P EST LOW 20 MIN: CPT

## 2021-11-01 RX ORDER — GLUCOSAMINE HCL/CHONDROITIN SU 500-400 MG
CAPSULE ORAL
Qty: 300 STRIP | Refills: 3 | Status: SHIPPED | OUTPATIENT
Start: 2021-11-01 | End: 2022-05-11 | Stop reason: SDUPTHER

## 2021-11-01 NOTE — PROGRESS NOTES
Diabetic Medication Management   [address] [place]. [place], [address] [place] [facility]  Phone: [phone]  Fax: [phone]    NAME: Kate Dee. MEDICAL RECORD NUMBER:  [id number]  AGE: 63 y.o. GENDER: male  : 1965  EPISODE DATE:  2021       Mr. Lilliana Castorena was referred to [facility] by Dr. Zaki Ambriz, Special instructions include: titrate all medications (as defined in clinic's policy and procedure)    Patient seen in office. Goals per referral:   Fasting blood glucose: < 130  Peak postprandial glucose: < 180  A1C: < 7    Other goals:  Blood pressure goal: 130/80  Weight loss goal (~10%): Target weight  410 to be reached by date: 2022 (3-6 months)  Physical Activity goal:    At this time regular exercise is not realistic for patient so no goal set. Smoking Cessation   Quit Date: Stopped 20. Cholesterol at target:   Date: Yes LDL 46  HDL 32 Trig 213 (2021)  Annual eye exam:    Date: 2021  Comprehensive annual foot exam:   Date:    Annual urine Albumin and serum creatinine:   Date:  microalbumin <12 mg/L (21), SrCr 2.61 mg/dL (10/23/21) eGFR 27.15 ml/min        Subjective   Mr. Lilliana Castorena is a 63 y.o. male here for the Diabetes Service for self-management education, medication review including over the counter medications and herbal products, overall well-being assessment, transition of care and any needed adjustments with updates and recommendations communicated to the referring physician. Patient's name and  verified. Patient Findings:    Patient having elevation in SrCr and will see nephrologist this afternoon. Will also evaluate for anemia and determine if patient needs to follow up with hematologist.  Patient takes ferrous sulfate daily. Had order placed for retacrit but has not started. Plans to talk with nephrologist today to see if needs to start.      Patient had bumetanide dose reduced from 3mg twice a day to 1mg twice a day last week. Patient feels he is retaining fluid as he is more SOB and feels like retaining fluid. Patient is not weighing himself. Will discuss with nephrologist today but encouraged patient to discuss with PCP if continues. PCP wants patient to start physical therapy. Patient wondering what they can do since he is in a wheelchair. Encouraged patient to call and make an appt for evaluation as PT can likely give him practical exercises that fit his ability. Also suggested an Arm Wheel exerciser which patient seems interested in and remarks he plans to order. This will allow him to sit in a wheelchair or stable chair and work his legs or use on a table to work his arms    Patient remarks interested in loosing weight an sometimes omits meals in effort to fast to loose weight. Educated patient that omiting meals may be slowing his metabolism as well as causing his body to release glucose storage to increase his BS and actually cause him to gain weight. Regular healthy meals may help patient reduce weight and control BS. Finished antibiotics for ear infection but pain has not resolved. Patient does not think it is an infection. Patient states has something sticking thru gums. States he had one about 10 days ago and was able to pull it out. Patient had 17 teeth pulled about 13 years ago and was told he may have bone spurs. Has another but unable to pull it. Thinks he needs to see a dentist.  Encouraged patient to call to make appt. For last two weeks patient has been using insulin 2-3 times daily. Reports timing of insulin doses / meals varying a lot. Breakfast has 8-9am.  Eating omelet, or two eggs with sausage. Often not eating again till 4pm which may or may not be his only other meal of the day. If eats later often a snack or subway cold cut 6 in on wheat bread. Has been doing between . 30 and . 40 for insulin doses. Only doing . 52 if blood sugar 200 or more.  Otherwise gives . 40 if 150-200 and if below 150 gives . 30. If below 120 often not giving any insulin. Review of BS information from Freestyle CGM shows BS often going high if insulin omitted but also going low if patient does not eat. Four days of hypoglycemia. Mostly occurring between 2am and 9am.  Patient states when blood sugar low he wakes up and checks BS. Patient reports drinking orange juice or taking glucose tablets and taking BS again to make sure has gone up again. Patient has enough insulin but needs refill of glucose test strips. Refill sent to [facility]. Patient saw cardiologist since last appt and was told results of all testing were OK. Patient does not want to answer if chest pain continues which writer takes to mean it is. Patient asks if chest pain could be from broken heart as patient has a lot of anxiety, depression and sadness. Expressed it could contribute but to never ignore new or changing chest pain. Patient reports taking allopurinol. Encouraged to discuss with nephrologist as last uric acid level in August 2021 was quite elevated. No further smoking of either cigarettes or marijuana.       []  Missed doses   []  Emergency Room Visit    []  Medication changes  []  Hospitalization   [x]  Diet changes   [x]  Acute illness   []  Activity changes      Symptoms of hypoglycemia    []  None    []  Shakiness    [x]  Lightheadedness or dizziness   []  Confusion      []  Sweating   [x]  Other wakes up from sleeping       Symptoms of hyperglycemia -.    []  None   [x]  Frequent urination    []  Increased thirst   []  Other    Medication adverse reactions (none due to diabetic medicaitons)   [x]  None   []  Diarrhea / Nausea / Vomiting / Constipation / flatulence   []  Hypertension   []  Peripheral edema     []  Signs of infection   []  Headache   []  Vision changes   []  Increased cholesterol    []  Weight gain   []  Change in renal function   []  Increased potassium  []  Other          If recent hospital admission / ED visit, was this related to Diabetes No:Chest pain      Objective     PMHx:    Past Medical History:   Diagnosis Date    Acute kidney injury superimposed on CKD ([address] 4/10/2013    Acute on chronic congestive heart failure (HCC)     Acute on chronic kidney failure ([address] 07/20/2017    Acute on chronic respiratory failure ([address] 10/02/2018    Acute on chronic respiratory failure with hypoxia (HCC) 9/30/2021    Adhesive capsulitis of left shoulder 03/25/2017    Anemia, normocytic normochromic 9/12/2021    Anxiety 10/02/2016    smokes marijuana for this    Arthropathy, unspecified, other specified sites 06/13/2013    Asthma     B12 deficiency     Bilateral lower leg cellulitis 02/17/2016    Blood in stool     CAD (coronary artery disease)     Cardiovascular stress test abnormal 2018    Cellulitis of both lower extremities 05/25/2017    Cellulitis of leg, left 07/20/2017    CHF (congestive heart failure), NYHA class III ([facility]) 08/14/2013    Chronic back pain     Chronic bronchitis ([facility])     Chronic headaches     was referred to neuro, testing scheduled    Chronic infective otitis externa 4/28/2017    Chronic malignant otitis externa of both ears 7/24/2020    Chronic respiratory failure ([address]     was on vent    Chronic ulcer of left leg, with fat layer exposed ([address] 02/22/2019    healed    Class 2 severe obesity due to excess calories with serious comorbidity and body mass index (BMI) of 35.0 to 35.9 in adult ([address]     (BMI 35.0-39.9 without comorbidity)    COPD exacerbation ([address] 11/02/2016    Diabetic neuropathy ([address] 08/14/2013    Displacement of lumbar intervertebral disc without myelopathy 06/13/2013    Ear infection     RIGHT    Elevated troponin     Essential hypertension     Facial cellulitis 2012    Fall 03/25/2017    GERD (gastroesophageal reflux disease)     Head injury     Hearing loss in right ear     pencil pierced ear as a child    Hepatic steatosis 12/03/2015    History of general anesthesia complication     has woke up during surgery under anesthesia    History of rib fracture 12/03/2015    Chronic     Hyperlipidemia     Hypersomnia     can go multiple days without sleeping    Hypertension     Insomnia     Intolerance of continuous positive airway pressure (CPAP) ventilation 07/20/2017    Iron deficiency     Localized rash     gets frequently in axilla, groin, in any fold, on several topical treatments for this    Lymphedema of both lower extremities 4/27/2021    Magnesium deficiency     Mastoiditis of left side     Mixed conductive and sensorineural hearing loss of both ears 01/10/2017    Per ENT    Mixed type COPD (chronic obstructive pulmonary disease) ([address]     On home O2, multiple inhlaers, nebulizer    Moderate recurrent major depression ([address] 10/02/2016    Morbid obesity with BMI of 45.0-49.9, adult ([address] 06/16/2015    NSTEMI (non-ST elevated myocardial infarction) ([address]     On home oxygen therapy     3 Lpm prn    Open wound of groin 12/19/2018    healed     BARBY on CPAP     Osteoarthritis     Otitis externa of left ear     Pancreatitis chronic     Persistent depressive disorder 11/19/2019    Renal insufficiency     proteinuria    Seizures ([address] 6/27/2019    Severe depression ([address] 09/25/2013    Spinal stenosis of lumbar region without neurogenic claudication 01/06/2016    MRI lumbar 12/30/15 L3-L4: There is broad-based bulging disc which appears protruding left laterally causing flattening of the ventral thecal sac. In addition, there is facet arthropathy with mild hypertrophic changes.  There is borderline central canal stenosis with  evidence of moderate left neural foraminal narrowing and mild right neural foramina narrowing.   L4-L5: There is broad-based protrud    Syncope 04/28/2017    Tinnitus of both ears 01/10/2017    Per ENT    Type 2 diabetes mellitus with stage 3 chronic kidney disease, with long-term current use of insulin ([facility] 2016    due to underlying condition with hyperosmolarity without coma    Type II or unspecified type diabetes mellitus without mention of complication, not stated as uncontrolled     uncontrolled    Uncontrolled type 2 diabetes mellitus with hyperglycemia ([facility] 7/15/2013    Unstable angina ([facility] 2021    Vitamin D deficiency     Wears glasses     for reading       Social History:    Social History     Tobacco Use    Smoking status: Former Smoker     Packs/day: 0.25     Years: 33.00     Pack years: 8.25     Types: Cigarettes     Start date: 1985     Quit date: 2020     Years since quittin.0    Smokeless tobacco: Former User     Types: Snuff     Quit date: 1995   Substance Use Topics    Alcohol use: No     Alcohol/week: 0.0 standard drinks       Pertinent Labs:    Lab Results   Component Value Date    LABA1C 7.7 (H) 10/23/2021    LABA1C 7.6 2021    LABA1C 8.0 2021     Lab Results   Component Value Date    CHOL 137 2021    TRIG 416 (H) 2021    HDL 30 (L) 2021     Lab Results   Component Value Date    CREATININE 2.61 (H) 10/23/2021    BUN 90 (H) 10/23/2021     10/23/2021    K 5.2 10/23/2021    CL 98 10/23/2021    CO2 29 10/23/2021     Lab Results   Component Value Date    ALT 13 10/23/2021         Wt Readings from Last 3 Encounters:   10/14/21 (!) 402 lb 14.1 oz (182.7 kg)   10/12/21 (!) 399 lb 6.4 oz (181.2 kg)   10/03/21 (!) 399 lb 0.5 oz (181 kg)       Current medications:  Prior to Admission medications    Medication Sig Start Date End Date Taking?  Authorizing Provider   gabapentin (NEURONTIN) 300 MG capsule TAKE ONE CAPSULE BY MOUTH TWICE A DAY 10/27/21 11/27/21  Shelia Mckeon MD   [facility]) 1 MG tablet Take 1 tablet by mouth 2 times daily Dose decreased 10/23/2021 10/23/21   Brayan Hernandez MD   neomycin-polymyxin-hydrocortisone (CORTISPORIN) 3.5-16210-8 otic suspension Place 3 drops into the right ear 4 times daily For 10 days 10/21/21   Rehan Freire MD   ammonium lactate (LAC-HYDRIN) 12 % lotion Apply topically daily. 10/14/21   Rehan Frerie MD   venlafaxine (EFFEXOR XR) 75 MG extended release capsule TAKE ONE CAPSULE BY MOUTH DAILY WITH FOOD 10/13/21   Rehan Freire MD   ROCKLATAN 0.02-0.005 % SOLN  10/7/21   Historical Provider, MD   nystatin (MYCOSTATIN) 708566 UNIT/ML suspension Take 5 mLs by mouth 4 times daily Swish and swallow 10/12/21   Rehan Freire MD   oxyCODONE HCl (OXY-IR) 10 MG immediate release tablet Take 1 tablet by mouth every 8 hours as needed for Pain for up to 30 days. 10/6/21 11/5/21  Rehan Freire MD   epoetin leslie-epbx (RETACRIT) 3000 UNIT/ML SOLN injection Inject 1 mL into the skin three times a week 10/4/21   Nai Carbajal MD   nicotine (NICODERM CQ) 21 MG/24HR Place 1 patch onto the skin daily 10/4/21   Nai Carbajal MD   Insulin Syringe-Needle U-100 31G X 5/16\" 1 ML MISC Use to subcutaneously inject insulin three times daily 9/22/21   Rehan Freire MD   allopurinol (ZYLOPRIM) 100 MG tablet Take 1 tablet by mouth daily FOR GOUT.  STOP IT IF ANY RASH DEVELOPS! 8/30/21   Rehan rFeire MD   vitamin D3 (CHOLECALCIFEROL) 25 MCG (1000 UT) TABS tablet Take 1 tablet by mouth daily 8/25/21   Rehan Freire MD   docusate sodium (COLACE) 100 MG capsule Take 1 capsule by mouth 2 times daily For constipation 8/17/21   Rehan Freire MD   latanoprost (XALATAN) 0.005 % ophthalmic solution 1 drop nightly    Historical Provider, MD etiennebital-acetaminophen-caffeine (FIORICET, ESGIC) -40 MG per tablet Take 1 tablet by mouth every 8 hours as needed for Headaches 8/13/21   Mia Briceno MD   clopidogrel (PLAVIX) 75 MG tablet Take 1 tablet by mouth daily 8/11/21   Rehan Freire MD   nitroGLYCERIN (NITROSTAT) 0.4 MG SL tablet DISSOLVE 1 TAB UNDER TONGUE FOR CHEST PAIN - IF PAIN REMAINS AFTER 5 MIN, CALL 911 AND REPEAT DOSE. MAX 3 TABS IN 15 MINUTES 8/2/21   Benjamin Martines MD   pantoprazole (PROTONIX) 40 MG tablet Take 1 tablet by mouth every morning (before breakfast) 7/20/21   Benjamin Martines MD   insulin regular human (HUMULIN R) 500 UNIT/ML concentrated injection vial Patient using 0.52ml with breakfast, 0.52ml with lunch and 0.45ml with dinner. Patient taking differently: Patient using 0.52ml with breakfast, 0.52ml with lunch and 0.40 ml with dinner. 6/28/21   Benjamin Martines MD   rosuvastatin (CRESTOR) 10 MG tablet Take 1 tablet by mouth nightly Stop pravastatin 5/21/21   Benjamin Martines MD   Continuous Blood Gluc Sensor (FREESTYLE CRISTINE 2 SENSOR) [facility] Patient to apply a new sensor every 14 days. RX to cover voucher patient will bring in. 5/19/21   Benjamin Martines MD   Gauze Pads & Dressings 4\"X4\" PADS Twice a day dressing of right leg wound 4/23/21   Benjamin Martines MD   Gauze Bandages (ROLLED GAUZE BANDAGE 4\"X2. 5YD) MISC For twice a day dressing 4/23/21   Benjamin Martines MD   PROAIR  (90 Base) MCG/ACT inhaler INHALE TWO PUFFS BY MOUTH EVERY 6 HOURS AS NEEDED FOR WHEEZING OR FOR SHORTNESS OF BREATH 4/13/21   Benjamin Martines MD   amitriptyline (ELAVIL) 50 MG tablet Take 2 po qhs  Patient taking differently: Take 50 mg by mouth nightly Take 1 po qhs 4/9/21   Juice Farooq MD   nystatin (MYCOSTATIN) 650480 UNIT/GM powder Apply 2 times daily in the skin folds for several months 3/31/21   Benjamin Martines MD   clobetasol (TEMOVATE) 0.05 % ointment Apply topically 2 times daily for psoriasis 1/27/21   Benjamin Martines MD   ketoconazole (NIZORAL) 2 % cream Apply twice a day for yeast infection in the skin folds, for 4 weeks 1/20/21   Benjamin Martines MD   albuterol (PROVENTIL) (2.5 MG/3ML) 0.083% nebulizer solution Take 3 mLs by nebulization every 6 hours as needed for Wheezing or Shortness of Breath 1/15/21   Benjamin Martines MD   Ferrous Sulfate (IRON) 325 (65 Fe) MG TABS Take 1 tablet by mouth daily 11/25/20   Vishnu Cole MD   metoprolol tartrate (LOPRESSOR) 50 MG tablet Take 1 tablet by mouth 2 times daily 11/4/20   Vishnu Cole MD   magnesium oxide (MAG-OX) 400 (240 Mg) MG tablet Take 1 tablet by mouth daily 11/4/20   Vishnu Cole MD   lisinopril (PRINIVIL;ZESTRIL) 5 MG tablet Take 1 tablet by mouth daily . Discontinued Lisinopril  10 mg 11/2/20   Vishnu Cole MD   fluticasone-salmeterol (ADVAIR HFA) 230-21 MCG/ACT inhaler Inhale 2 puffs into the lungs 2 times daily 10/19/20   Vishnu Cole MD   tiotropium (SPIRIVA RESPIMAT) 2.5 MCG/ACT AERS inhaler Inhale 2 puffs into the lungs daily 10/19/20   Vishnu Cole MD   blood glucose monitor strips Test 3 times a day & as needed for symptoms of irregular blood glucose. Dispense sufficient amount for indicated testing frequency plus additional to accommodate PRN testing needs. One touch Ultra blue 9/30/20   Leonor Rosales MD   Lancets MISC Use to check blood sugar three times daily along with when necessary due to symptoms. 9/30/20   Leonor Rosales MD   vitamin B-12 (CYANOCOBALAMIN) 500 MCG tablet Take 1 tablet by mouth daily 7/13/20   Vishnu Cole MD   Oxygen Tubing MISC by Does not apply route DX COPD. chronic respiratory failure 3/26/20   Vishnu Cole MD   Respiratory Therapy Supplies (NEBULIZER/TUBING/MOUTHPIECE) KIT Dx COPD needs nebulizer supplies 2/20/20   Vishnu Cole MD   ONE TOUCH ULTRASOFT LANCETS [address] [facility] Patient to test blood sugar up to 4 times daily. 11/7/19   Leonor Rosales MD   Lancet Devices (LANCING DEVICE) MISC Provide patient with lancing device appropriate for his machine/lancing needles. 6/4/19   Vishnu Cole MD   Handicap Placard MISC by Does not apply route Can't walk greater than 200 feet. Expires in 5 years.  2/13/19   Paige Bryant MD   fluticasone (CUTIVATE) 0.05 % cream Apply topically 2 times daily  4/19/17   Historical Provider, MD   fluticasone (FLONASE) 50 MCG/ACT nasal spray 2 sprays by Nasal route daily  Patient taking differently: 2 sprays by Nasal route daily as needed (sinus symptoms)  1/16/17   Ron Fraser MD   Melatonin 10 MG TABS Take 10 mg by mouth nightly as needed (insomnia) 12/23/16   Ron Fraser MD   aspirin 81 MG EC tablet Take 81 mg by mouth daily. Historical Provider, MD       Immunizations:   Most Recent Immunizations   Administered Date(s) Administered    COVID-19, Ciera Cazares, Primary or Immunocompromised, PF, 100mcg/0.5mL 05/27/2021    DT (pediatric) 12/14/1998    Hepatitis B Adult (Engerix-B) 12/04/2019    Hepatitis B Adult (Recombivax HB) 12/04/2019    Influenza Virus Vaccine 10/12/2015    Influenza, MDCK Quadv, IM, PF (Flucelvax 2 yrs and older) 10/14/2021    Influenza, Quadv, IM, PF (6 mo and older Fluzone, Flulaval, Fluarix, and 3 yrs and older Afluria) 10/09/2020    Pneumococcal Polysaccharide ([id number]) 05/23/2013    Tdap (Boostrix, Adacel) 09/12/2018       Home Blood Glucose Results:   Per patient's Freestyle Lianne report below               Patients blood sugars are actually slightly better than last visit. While patient is not eating three meals a day is doing better than before and is taking insulin more regularly. Discussed how his U500 works as both his basal and mealtime insulin and that by skipping meals and going long periods without insulin doses he is causing BS to fluctuate more. Patient has had hypoglycemia several times but when spoke to patient appears to be when patient skipped meal or ate very little. Percentage of blood sugars within range has increased from last visit and % above and below range has also decreased. While still above goal most of time hesitant to increase insulin due to hypoglycemia.       Assessment     A1c at goal:No: 7.7 (10/23/21)    Blood Pressure at goal: 108/48 on 11/1//21  Weight at goal: No: Physical activity: No  Physical activity at goal: No  Patient encouraged but unable to due SOB. Smoking Cessation: Yes    Cholesterol at target: Yes  Annual eye exam completed: Yes  Comprehensive Foot Exam Completed: Yes  Annual urine albumin and serum creatinine: Yes    Statin: Yes    Appropriate?: Yes  Changes made: refill provided    ACE/ARB: Yes  Appropriate?: Yes  Changes made:     Aspirin: Yes  Appropriate?: Yes  Changes made:     Eating patterns:    [x]  My plate    []  Mediterranean diet   []  Low sodium   []  DASH diet   [x]  Portion control   [x]  Reduced calorie    []  Fast food / Restaurant  []  High glycemic index foods   []  Sugary beverages   []  Other     Comment: see above    Current Medications Affecting Diabetes:  Humulin R 500    Compliant:No  Barriers to medication therapy: anxiety / depression    Medications attempted in the past:  Metformin - cardiologist took him off but patient unsure why  Januvia - PCP took him off but patient unsure why    Recent exacerbations / new problems:  Gout / Chest pain/pressure    Last office dictation reviewed: yes        type 2 DM under stable control as evidenced by A1C 7.7 on 10/23/21    Plan   Counseling at today's visit:     -Continued monitoring of blood glucose with use of Freestyle Lianne. Call with any issues with sensor. Bring data cord to each visit. Call and make an appointment for a dentist.      Discuss uric acid elevation with nephrologist    Eat three meals a day. Try to have a meal / snack closer to noon. Continue Humulin 500 dosing as follows: If blood sugar 200 or more take . 50  If blood sugar 150-199  take . 40  If blood sugar 121-149 take . 30  If blood sugar  take . 20  If blood sugar below 90 do not take insulin. Patient has log to document time and what he eats as far as meals given to him at previous visit. Will compare blood sugars around meals to determine need for adjustment.   Asked to complete information for one meal a day for two weeks prior to next appt. Physician Follow-up:   Dr Farideh Ibarra     Medication Management Follow-up:   Diabetes Service   4 weeks in person due to difficulty getting to appts. Will follow up by phone sooner. Electronically signed by Rosalinda Martinez RPH on 11/1/2021 at 9:52 AM     For Pharmacy [id number] in place: Yes   Recommendation Provided To: Patient/Caregiver: 4 via In person   Intervention Detail: Dose Adjustment: 1, reason: Therapy Optimization, New Rx: 1, reason: Needs Additional Therapy, Refill(s) Provided and Scheduled Appointment   Intervention Accepted By: Patient/Caregiver: 4   Time Spent (min): 45     Yesenia Rizvi RPH,Pharm. D,, BCPS, CACP  11/1/2021  9:52 AM

## 2021-11-03 ENCOUNTER — CARE COORDINATION (OUTPATIENT)
Dept: CASE MANAGEMENT | Age: 56
End: 2021-11-03

## 2021-11-03 NOTE — CARE COORDINATION
[facility] 45 Transitions Follow Up Call    11/3/2021    Patient: Tika Brothers Patient : 1965   MRN: [id number]  Reason for Admission: NSTEMI  Discharge Date: 10/4/21 RARS: Readmission Risk Score: 55         Spoke with: Tika Dowd. Was able to contact [place] for final outreach. He stated that he was hanging in there. He said that he was told that he now has stage 4 kidney and he said that he will not go on dialysis. He said that he had 2 sisters and a father die from kidney disease. He said that the nephrologist stopped his lisinopril, and his Bumex was on hold. He will be getting lab work Thursday and he is to monitor for s/s of fluid overload. He said that he has his ACP done and his family is aware. He had no further questions or concerns. Informed of final outreach. Care Transitions Follow Up Call    Needs to be reviewed by the provider   Additional needs identified to be addressed with provider: No  none             Method of communication with provider : none      Care Transition Nurse (CTN) contacted the patient by telephone to follow up after admission on 21. Verified name and  with patient as identifiers. Addressed changes since last contact: none  Discussed follow-up appointments. CTN reviewed discharge instructions, medical action plan and red flags with patient and discussed any barriers to care and/or understanding of plan of care after discharge. Discussed appropriate site of care based on symptoms and resources available to patient including: PCP, Specialist and When to call 911. The patient agrees to contact the PCP office for questions related to their healthcare. Patients top risk factors for readmission: medical condition-CKD  Interventions to address risk factors: Obtained and reviewed discharge summary and/or continuity of care documents      Non-[facility] follow up appointment(s):     CTN provided contact information for future needs.  No further follow-up call indicated based on severity of symptoms and risk factors. Plan for next call: final call. episode ended. Care Transitions Subsequent and Final Call    Subsequent and Final Calls  Do you have any ongoing symptoms?: No  Have your medications changed?: Yes  Patient Reports: Bumex on hold and stopped Lisinopril  Do you have any questions related to your medications?: No  Do you currently have any active services?: Yes  Are you currently active with any services?: Outpatient/Community Services  Do you have any needs or concerns that I can assist you with?: No  Care Transitions Interventions  Other Interventions:            Follow Up  Future Appointments   Date Time Provider William Hernandez   11/11/2021  8:00 AM Mia Briceno MD Neuro Spec [facility]   12/13/2021 10:10 AM [facility] MEDICATION MGMT [facility]   3/2/2022  8:30 AM Rehan Freire MD fp sc [facility]   3/17/2022  8:00 AM [facility]   3/22/2022 10:00 AM MD William Arias RN

## 2021-11-04 ENCOUNTER — HOSPITAL ENCOUNTER (OUTPATIENT)
Age: 56
Discharge: HOME | End: 2021-11-04
Payer: COMMERCIAL

## 2021-11-04 ENCOUNTER — PATIENT MESSAGE (OUTPATIENT)
Dept: FAMILY MEDICINE CLINIC | Age: 56
End: 2021-11-04

## 2021-11-04 DIAGNOSIS — E87.1 HYPONATREMIA: ICD-10-CM

## 2021-11-04 DIAGNOSIS — N18.4 CKD (CHRONIC KIDNEY DISEASE), STAGE IV (HCC): ICD-10-CM

## 2021-11-04 DIAGNOSIS — N18.4 BENIGN HYPERTENSION WITH CKD (CHRONIC KIDNEY DISEASE) STAGE IV (HCC): ICD-10-CM

## 2021-11-04 DIAGNOSIS — M51.36 DDD (DEGENERATIVE DISC DISEASE), LUMBAR: ICD-10-CM

## 2021-11-04 DIAGNOSIS — I12.9 BENIGN HYPERTENSION WITH CKD (CHRONIC KIDNEY DISEASE) STAGE IV (HCC): ICD-10-CM

## 2021-11-04 DIAGNOSIS — G89.29 CHRONIC MIDLINE LOW BACK PAIN WITH LEFT-SIDED SCIATICA: ICD-10-CM

## 2021-11-04 DIAGNOSIS — E83.39 HYPERPHOSPHATEMIA: ICD-10-CM

## 2021-11-04 DIAGNOSIS — N18.4 SECONDARY DIABETES MELLITUS WITH STAGE 4 CHRONIC KIDNEY DISEASE (HCC): ICD-10-CM

## 2021-11-04 DIAGNOSIS — E13.22 SECONDARY DIABETES MELLITUS WITH STAGE 4 CHRONIC KIDNEY DISEASE (HCC): ICD-10-CM

## 2021-11-04 DIAGNOSIS — M54.42 CHRONIC MIDLINE LOW BACK PAIN WITH LEFT-SIDED SCIATICA: ICD-10-CM

## 2021-11-04 DIAGNOSIS — M96.1 POSTLAMINECTOMY SYNDROME OF LUMBAR REGION: ICD-10-CM

## 2021-11-04 DIAGNOSIS — M48.061 SPINAL STENOSIS OF LUMBAR REGION WITHOUT NEUROGENIC CLAUDICATION: ICD-10-CM

## 2021-11-04 LAB
ANION GAP SERPL CALCULATED.3IONS-SCNC: 11 MMOL/L (ref 9–17)
BUN BLDV-MCNC: 38 MG/DL (ref 6–20)
BUN/CREAT BLD: ABNORMAL (ref 9–20)
CALCIUM SERPL-MCNC: 8.9 MG/DL (ref 8.6–10.4)
CHLORIDE BLD-SCNC: 102 MMOL/L (ref 98–107)
CO2: 24 MMOL/L (ref 20–31)
CREAT SERPL-MCNC: 1.65 MG/DL (ref 0.7–1.2)
GFR AFRICAN AMERICAN: 53 ML/MIN
GFR NON-AFRICAN AMERICAN: 43 ML/MIN
GFR SERPL CREATININE-BSD FRML MDRD: ABNORMAL ML/MIN/{1.73_M2}
GFR SERPL CREATININE-BSD FRML MDRD: ABNORMAL ML/MIN/{1.73_M2}
GLUCOSE BLD-MCNC: 260 MG/DL (ref 70–99)
POTASSIUM SERPL-SCNC: 5.2 MMOL/L (ref 3.7–5.3)
SODIUM BLD-SCNC: 137 MMOL/L (ref 135–144)

## 2021-11-04 PROCEDURE — 36415 COLL VENOUS BLD VENIPUNCTURE: CPT

## 2021-11-04 PROCEDURE — 80048 BASIC METABOLIC PNL TOTAL CA: CPT

## 2021-11-04 RX ORDER — OXYCODONE HYDROCHLORIDE 10 MG/1
10 TABLET ORAL EVERY 8 HOURS PRN
Qty: 90 TABLET | Refills: 0 | Status: SHIPPED | OUTPATIENT
Start: 2021-11-04 | End: 2021-12-02 | Stop reason: SDUPTHER

## 2021-11-04 NOTE — TELEPHONE ENCOUNTER
From: Ana Maria Jon.   To: Benjamin Martines MD  Sent: 11/4/2021 8:48 AM EDT  Subject: Prescription Question    Can you send a refill for my oxycodone 10 mg one tablet every eight hours

## 2021-11-11 ENCOUNTER — OFFICE VISIT (OUTPATIENT)
Dept: NEUROLOGY | Age: 56
End: 2021-11-11
Payer: COMMERCIAL

## 2021-11-11 VITALS
WEIGHT: 315 LBS | SYSTOLIC BLOOD PRESSURE: 135 MMHG | BODY MASS INDEX: 42.66 KG/M2 | DIASTOLIC BLOOD PRESSURE: 58 MMHG | HEART RATE: 95 BPM | HEIGHT: 72 IN | TEMPERATURE: 97.2 F

## 2021-11-11 DIAGNOSIS — R56.9 SEIZURES (HCC): ICD-10-CM

## 2021-11-11 DIAGNOSIS — G43.009 MIGRAINE WITHOUT AURA AND WITHOUT STATUS MIGRAINOSUS, NOT INTRACTABLE: ICD-10-CM

## 2021-11-11 DIAGNOSIS — E08.42 DIABETIC POLYNEUROPATHY ASSOCIATED WITH DIABETES MELLITUS DUE TO UNDERLYING CONDITION (HCC): Primary | ICD-10-CM

## 2021-11-11 DIAGNOSIS — G89.29 CHRONIC LOW BACK PAIN WITH SCIATICA, SCIATICA LATERALITY UNSPECIFIED, UNSPECIFIED BACK PAIN LATERALITY: ICD-10-CM

## 2021-11-11 DIAGNOSIS — M54.40 CHRONIC LOW BACK PAIN WITH SCIATICA, SCIATICA LATERALITY UNSPECIFIED, UNSPECIFIED BACK PAIN LATERALITY: ICD-10-CM

## 2021-11-11 PROCEDURE — 1036F TOBACCO NON-USER: CPT | Performed by: PSYCHIATRY & NEUROLOGY

## 2021-11-11 PROCEDURE — 2022F DILAT RTA XM EVC RTNOPTHY: CPT | Performed by: PSYCHIATRY & NEUROLOGY

## 2021-11-11 PROCEDURE — G8427 DOCREV CUR MEDS BY ELIG CLIN: HCPCS | Performed by: PSYCHIATRY & NEUROLOGY

## 2021-11-11 PROCEDURE — 99214 OFFICE O/P EST MOD 30 MIN: CPT | Performed by: PSYCHIATRY & NEUROLOGY

## 2021-11-11 PROCEDURE — G8482 FLU IMMUNIZE ORDER/ADMIN: HCPCS | Performed by: PSYCHIATRY & NEUROLOGY

## 2021-11-11 PROCEDURE — 3017F COLORECTAL CA SCREEN DOC REV: CPT | Performed by: PSYCHIATRY & NEUROLOGY

## 2021-11-11 PROCEDURE — G8417 CALC BMI ABV UP PARAM F/U: HCPCS | Performed by: PSYCHIATRY & NEUROLOGY

## 2021-11-11 RX ORDER — GABAPENTIN 300 MG/1
CAPSULE ORAL
Qty: 90 CAPSULE | Refills: 2 | Status: SHIPPED | OUTPATIENT
Start: 2021-11-11 | End: 2022-02-11 | Stop reason: SDUPTHER

## 2021-11-11 RX ORDER — TIZANIDINE 4 MG/1
TABLET ORAL
COMMUNITY
Start: 2021-11-10 | End: 2021-12-23 | Stop reason: SDUPTHER

## 2021-11-11 RX ORDER — LISINOPRIL 5 MG/1
5 TABLET ORAL DAILY
COMMUNITY
End: 2022-03-02 | Stop reason: SDUPTHER

## 2021-11-11 RX ORDER — GUAIFENESIN 600 MG/1
TABLET, EXTENDED RELEASE ORAL
COMMUNITY
Start: 2021-11-08

## 2021-11-11 RX ORDER — BUTALBITAL, ACETAMINOPHEN AND CAFFEINE 50; 325; 40 MG/1; MG/1; MG/1
1 TABLET ORAL EVERY 8 HOURS PRN
Qty: 60 TABLET | Refills: 1 | Status: SHIPPED | OUTPATIENT
Start: 2021-11-11 | End: 2022-02-11 | Stop reason: SDUPTHER

## 2021-11-11 RX ORDER — AMITRIPTYLINE HYDROCHLORIDE 50 MG/1
TABLET, FILM COATED ORAL
Qty: 30 TABLET | Refills: 5 | Status: SHIPPED | OUTPATIENT
Start: 2021-11-11 | End: 2022-05-23

## 2021-11-11 ASSESSMENT — ENCOUNTER SYMPTOMS
BACK PAIN: 1
SHORTNESS OF BREATH: 1
DIARRHEA: 1
COUGH: 1

## 2021-11-11 NOTE — PROGRESS NOTES
Subjective:      Patient ID: Noel Gomes is a 63 y.o. male. HPI  Active problem chronic daily headaches with painful diabetic neuropathy on elavil and neurontin . Provoked seizures in 2019 attributed to prozac and chantixx . Scalp paresthesias which are benign along with tinnitus attributed to sensorineural hearing loss . The condition is he reports that nerve pain in feet has improved to grade 4 over 10 with addition of neurontin to elavil before pain being at grade 8 over 10 . He is on neuronin 300 mg po bid. He can tolerate elavil 50 mg po qhs having hallucinations with higher dose . Pain is legs and feet is like walking on hot needles . Headaches are not as aften occurring twice per week over right parietal head of pressure throbbing up to grade 8 over 10 . He has had no seizures . He has morbid obesity and COPD getting short winded on walking short distances using wheel chair for longer distances. There will be leg giveway although bigger issue will be shortness of breath . He has diabetes mellitus with neuropathy with numbness to mid lower legs . There is chronic low back pain with baseline aching stabbing at grade 8 over 10 more with activity with radiation down left leg . He is on oxycodone qid through PMD . He has had four low back surgeries with prior pain clinic evaluation getting epidurals not wanting radiofrequency. There will be numbness of fingers . He has sleep apnea using nasal CPAP on nightly basis . There are mild memory complaints . Significant medications neurontin 300 mg po bid ,  elavil 50 mg po qhs, [id number] mg po qid  . Testing carotid US nonstenotic plaque formation , May 2017 . Cardiac 2 D echo normal LVF , EF 55-60% . Enlarged left atrium . Mild MR and TR , August 2017 . Head CT nomral brain , June 2017 .  EEG normal , July 2019      Past Medical History:   Diagnosis Date    Acute kidney injury superimposed on CKD ([address] 4/10/2013    Acute on chronic congestive heart failure ([address]     Acute on chronic kidney failure ([address] 07/20/2017    Acute on chronic respiratory failure ([address] 10/02/2018    Acute on chronic respiratory failure with hypoxia ([address] 9/30/2021    Adhesive capsulitis of left shoulder 03/25/2017    Anemia, normocytic normochromic 9/12/2021    Anxiety 10/02/2016    smokes marijuana for this    Arthropathy, unspecified, other specified sites 06/13/2013    Asthma     B12 deficiency     Bilateral lower leg cellulitis 02/17/2016    Blood in stool     CAD (coronary artery disease)     Cardiovascular stress test abnormal 2018    Cellulitis of both lower extremities 05/25/2017    Cellulitis of leg, left 07/20/2017    CHF (congestive heart failure), NYHA class III (HCC) 08/14/2013    Chronic back pain     [facility]     Chronic headaches     was referred to neuro, testing scheduled    Chronic infective otitis externa 4/28/2017    Chronic malignant otitis externa of both ears 7/24/2020    Chronic respiratory failure (HCC)     was on vent    Chronic ulcer of left leg, with fat layer exposed ([address] 02/22/2019    healed    Class 2 severe obesity due to excess calories with serious comorbidity and body mass index (BMI) of 35.0 to 35.9 in adult ([address]     (BMI 35.0-39.9 without comorbidity)    COPD exacerbation ([address] 11/02/2016    Diabetic neuropathy ([address] 08/14/2013    Displacement of lumbar intervertebral disc without myelopathy 06/13/2013    Ear infection     RIGHT    Elevated troponin     Essential hypertension     Facial cellulitis 2012    Fall 03/25/2017    GERD (gastroesophageal reflux disease)     Head injury     Hearing loss in right ear     pencil pierced ear as a child    Hepatic steatosis 12/03/2015    History of general anesthesia complication     has woke up during surgery under anesthesia    History of rib fracture 12/03/2015    Chronic     Hyperlipidemia     Hypersomnia     can go multiple days without sleeping    Hypertension     Insomnia     Intolerance of continuous positive airway pressure (CPAP) ventilation 07/20/2017    Iron deficiency     Localized rash     gets frequently in axilla, groin, in any fold, on several topical treatments for this    Lymphedema of both lower extremities 4/27/2021    Magnesium deficiency     Mastoiditis of left side     Mixed conductive and sensorineural hearing loss of both ears 01/10/2017    Per ENT    Mixed type COPD (chronic obstructive pulmonary disease) ([address]     On home O2, multiple inhlaers, nebulizer    Moderate recurrent major depression ([address] 10/02/2016    Morbid obesity with BMI of 45.0-49.9, adult ([address] 06/16/2015    NSTEMI (non-ST elevated myocardial infarction) ([address]     On home oxygen therapy     3 Lpm prn    Open wound of groin 12/19/2018    healed     BARBY on CPAP     Osteoarthritis     Otitis externa of left ear     Pancreatitis chronic     Persistent depressive disorder 11/19/2019    Renal insufficiency     proteinuria    Seizures ([address] 6/27/2019    Severe depression ([address] 09/25/2013    Spinal stenosis of lumbar region without neurogenic claudication 01/06/2016    MRI lumbar 12/30/15 L3-L4: There is broad-based bulging disc which appears protruding left laterally causing flattening of the ventral thecal sac. In addition, there is facet arthropathy with mild hypertrophic changes.  There is borderline central canal stenosis with  evidence of moderate left neural foraminal narrowing and mild right neural foramina narrowing.   L4-L5: There is broad-based protrud    Syncope 04/28/2017    Tinnitus of both ears 01/10/2017    Per ENT    Type 2 diabetes mellitus with stage 3 chronic kidney disease, with long-term current use of insulin ([address] 12/26/2016    due to underlying condition with hyperosmolarity without coma    Type II or unspecified type diabetes mellitus without mention of complication, not stated as uncontrolled     uncontrolled    Uncontrolled type 2 diabetes mellitus with hyperglycemia ([facility] 7/15/2013    Unstable angina ([facility] 2021    Vitamin D deficiency     Wears glasses     for reading       Past Surgical History:   Procedure Laterality Date    BACK SURGERY   (x 4) ,.2011.2012     Dr Lao Many last [address]  2018    PATENT OM STENT    COLONOSCOPY  2015    hemorrhoids, poor prep, not done    COLONOSCOPY      COLONOSCOPY N/A 2021    COLONOSCOPY POLYPECTOMY SNARE/COLD BIOPSY/HOT BIOPSY/CLIP APPLICATION X1 performed by Lexi Lopez [facility]  03/2013    x 1    HAND TENDON SURGERY Left     thumb tendon repair    INTRACAPSULAR CATARACT EXTRACTION Right 2019    EYE CATARACT EMULSIFICATION IOL IMPLANT performed by Jory Diane MD at [address] Left 2020    EYE CATARACT EMULSIFICATION IOL IMPLANT performed by Jory Diane MD at [facility] ARTHROSCOPY Left     NERVE BLOCK  2015    TENS unit    SD ESOPHAGOGASTRODUODENOSCOPY TRANSORAL DIAGNOSTIC N/A 2018    EGD ESOPHAGOGASTRODUODENOSCOPY performed by Lexi Lopez MD at [facility] Bilateral 2012    Dr [address]       Family History   Problem Relation Age of Onset    Heart Disease Mother          age 64 from [place]   Aileen Blank High Blood Pressure Mother     Diabetes Mother     High Blood Pressure Father          age 80 from CKD and Lung Fibrosis    Kidney Disease Father     Heart Disease Sister     Heart Attack Sister     Obesity Sister     Diabetes Sister        Social History     Socioeconomic History    Marital status:      Spouse name: Genaro Modi Number of children: 11    Years of education: None    Highest education level: None   Occupational History    Occupation: disability     Comment: unemployed   Tobacco Use    Smoking status: Former Smoker Packs/day: 0.25     Years: 33.00     Pack years: 8.25     Types: Cigarettes     Start date: 1985     Quit date: 2020     Years since quittin.0    Smokeless tobacco: Former User     Types: Snuff     Quit date: 1995   Vaping Use    Vaping Use: Never used   Substance and Sexual Activity    Alcohol use: No     Alcohol/week: 0.0 standard drinks    Drug use: Not Currently     Types: Marijuana [place])     Comment: Patient reports he quit using THC for 26 days on 2021    Sexual activity: Not Currently     Partners: Female   Other Topics Concern    None   Social History Narrative    Middle of possible separation 2016          Social Determinants of Health     Financial Resource Strain: Low Risk     Difficulty of Paying Living Expenses: Not hard at all   Food Insecurity: No Food Insecurity    Worried About Running Out of Food in the Last Year: Never true    Basil of Food in the Last Year: Never true   Transportation Needs: No Transportation Needs    Lack of Transportation (Medical): No    Lack of Transportation (Non-Medical):  No   Physical Activity:     Days of Exercise per Week: Not on file    Minutes of Exercise per Session: Not on file   Stress:     Feeling of Stress : Not on file   Social Connections:     Frequency of Communication with Friends and Family: Not on file    Frequency of Social Gatherings with Friends and Family: Not on file    Attends Adventism Services: Not on file    Active Member of Clubs or Organizations: Not on file    Attends Club or Organization Meetings: Not on file    Marital Status: Not on file   Intimate Partner Violence:     Fear of Current or Ex-Partner: Not on file    Emotionally Abused: Not on file    Physically Abused: Not on file    Sexually Abused: Not on file   Housing Stability: Unknown    Unable to Pay for Housing in the Last Year: No    Number of [place] in the Last Year: Not on file    Unstable Housing in the Last Year: No Current Outpatient Medications   Medication Sig Dispense Refill    MUCUS RELIEF 600 MG extended release tablet       tiZANidine (ZANAFLEX) 4 MG tablet       lisinopril (PRINIVIL;ZESTRIL) 5 MG tablet Take 5 mg by mouth daily      gabapentin (NEURONTIN) 300 MG capsule TAKE ONE CAPSULE BY MOUTH THREE TIMES PER DAY 90 capsule 2    butalbital-acetaminophen-caffeine (FIORICET, ESGIC) -40 MG per tablet Take 1 tablet by mouth every 8 hours as needed for Headaches 60 tablet 1    amitriptyline (ELAVIL) 50 MG tablet Take 1 po qhs 30 tablet 5    oxyCODONE HCl (OXY-IR) 10 MG immediate release tablet Take 1 tablet by mouth every 8 hours as needed for Pain for up to 30 days. 90 tablet 0    blood glucose monitor strips Test 3 times a day & as needed for symptoms of irregular blood glucose. Dispense sufficient amount for indicated testing frequency plus additional to accommodate PRN testing needs. One touch Ultra blue 300 strip 3    bumetanide (BUMEX) 1 MG tablet Take 1 tablet by mouth 2 times daily Dose decreased 10/23/2021 180 tablet 0    neomycin-polymyxin-hydrocortisone (CORTISPORIN) 3.5-13594-6 otic suspension Place 3 drops into the right ear 4 times daily For 10 days 10 mL 0    ammonium lactate (LAC-HYDRIN) 12 % lotion Apply topically daily. 222 mL 0    venlafaxine (EFFEXOR XR) 75 MG extended release capsule TAKE ONE CAPSULE BY MOUTH DAILY WITH FOOD 90 capsule 3    ROCKLATAN 0.02-0.005 % SOLN       nystatin (MYCOSTATIN) 633736 UNIT/ML suspension Take 5 mLs by mouth 4 times daily Swish and swallow 240 mL 0    epoetin leslie-epbx (RETACRIT) 3000 UNIT/ML SOLN injection Inject 1 mL into the skin three times a week 21.9 mL 0    nicotine (NICODERM CQ) 21 MG/24HR Place 1 patch onto the skin daily 30 patch 3    Insulin Syringe-Needle U-100 31G X 5/16\" 1 ML MISC Use to subcutaneously inject insulin three times daily 300 each 2    allopurinol (ZYLOPRIM) 100 MG tablet Take 1 tablet by mouth daily FOR GOUT. STOP IT IF ANY RASH DEVELOPS! 90 tablet 3    vitamin D3 (CHOLECALCIFEROL) 25 MCG (1000 UT) TABS tablet Take 1 tablet by mouth daily 90 tablet 1    docusate sodium (COLACE) 100 MG capsule Take 1 capsule by mouth 2 times daily For constipation 180 capsule 3    latanoprost (XALATAN) 0.005 % ophthalmic solution 1 drop nightly      clopidogrel (PLAVIX) 75 MG tablet Take 1 tablet by mouth daily 90 tablet 3    nitroGLYCERIN (NITROSTAT) 0.4 MG SL tablet DISSOLVE 1 TAB UNDER TONGUE FOR CHEST PAIN - IF PAIN REMAINS AFTER 5 MIN, CALL 911 AND REPEAT DOSE. MAX 3 TABS IN 15 MINUTES 25 tablet 2    pantoprazole (PROTONIX) 40 MG tablet Take 1 tablet by mouth every morning (before breakfast) 90 tablet 1    insulin regular human (HUMULIN R) 500 UNIT/ML concentrated injection vial Patient using 0.52ml with breakfast, 0.52ml with lunch and 0.45ml with dinner. (Patient taking differently: Patient using 0.52ml with breakfast, 0.52ml with lunch and 0.40 ml with dinner.) 60 mL 3    rosuvastatin (CRESTOR) 10 MG tablet Take 1 tablet by mouth nightly Stop pravastatin 90 tablet 3    Continuous Blood Gluc Sensor (FREESTYLE CRISTINE 2 SENSOR) [facility] Patient to apply a new sensor every 14 days. RX to cover voucher patient will bring in. 2 each 0    Gauze Pads & Dressings 4\"X4\" PADS Twice a day dressing of right leg wound 60 each 5    Gauze Bandages (ROLLED GAUZE BANDAGE 4\"X2. 5YD) MISC For twice a day dressing 60 each 5    PROAIR  (90 Base) MCG/ACT inhaler INHALE TWO PUFFS BY MOUTH EVERY 6 HOURS AS NEEDED FOR WHEEZING OR FOR SHORTNESS OF BREATH 8.5 g 3    nystatin (MYCOSTATIN) 696623 UNIT/GM powder Apply 2 times daily in the skin folds for several months 1 Bottle 3    clobetasol (TEMOVATE) 0.05 % ointment Apply topically 2 times daily for psoriasis 1 Tube 3    ketoconazole (NIZORAL) 2 % cream Apply twice a day for yeast infection in the skin folds, for 4 weeks 1 Tube 3    albuterol (PROVENTIL) (2.5 MG/3ML) 0.083% nebulizer solution Take 3 mLs by nebulization every 6 hours as needed for Wheezing or Shortness of Breath 120 vial 3    Ferrous Sulfate (IRON) 325 (65 Fe) MG TABS Take 1 tablet by mouth daily 90 tablet 3    metoprolol tartrate (LOPRESSOR) 50 MG tablet Take 1 tablet by mouth 2 times daily 180 tablet 3    magnesium oxide (MAG-OX) 400 (240 Mg) MG tablet Take 1 tablet by mouth daily 90 tablet 3    fluticasone-salmeterol (ADVAIR HFA) 230-21 MCG/ACT inhaler Inhale 2 puffs into the lungs 2 times daily 12 g 11    tiotropium (SPIRIVA RESPIMAT) 2.5 MCG/ACT AERS inhaler Inhale 2 puffs into the lungs daily 12 g 11    Lancets MISC Use to check blood sugar three times daily along with when necessary due to symptoms. 300 each 2    vitamin B-12 (CYANOCOBALAMIN) 500 MCG tablet Take 1 tablet by mouth daily 90 tablet 3    Oxygen Tubing MISC by Does not apply route DX COPD. chronic respiratory failure 1 each 0    Respiratory Therapy Supplies (NEBULIZER/TUBING/MOUTHPIECE) KIT Dx COPD needs nebulizer supplies 1 kit 11    ONE TOUCH ULTRASOFT LANCETS MISC Patient to test blood sugar up to 4 times daily. 300 each 3    Lancet Devices (LANCING DEVICE) MISC Provide patient with lancing device appropriate for his machine/lancing needles. 1 each 1    Handicap Placard [facility] by Does not apply route Can't walk greater than 200 feet. Expires in 5 years. 1 each 0    fluticasone (CUTIVATE) 0.05 % cream Apply topically 2 times daily       fluticasone (FLONASE) 50 MCG/ACT nasal spray 2 sprays by Nasal route daily (Patient taking differently: 2 sprays by Nasal route daily as needed (sinus symptoms) ) 1 Bottle 3    Melatonin 10 MG TABS Take 10 mg by mouth nightly as needed (insomnia) 90 tablet 1    aspirin 81 MG EC tablet Take 81 mg by mouth daily. No current facility-administered medications for this visit.        Allergies   Allergen Reactions    Levofloxacin Anaphylaxis     Patient reports needing epinephrine \"about 5 or 6 months ago\" for anaphylaxis (itching, hives, SOB/swelling) after receiving Levofloxacin. Previous report from 08/20/2012: Nausea/Vomiting    Lorazepam      Falls      Nsaids      CHF&CKD    Prozac [Fluoxetine Hcl] Other (See Comments)     Pt started with seizures after started taking.  Vancomycin Other (See Comments)     Itching, SOB, emesis upon infusion in ED 6/12/2019. Patient states he has had vancomycin \"a number of times\" before without issue. couldn't breath and talk, throat closed       Review of Systems   Constitutional: Positive for unexpected weight change. HENT: Positive for ear pain, hearing loss and tinnitus. Eyes: Positive for visual disturbance. Respiratory: Positive for cough and shortness of breath. Cardiovascular: Positive for chest pain and leg swelling. Gastrointestinal: Positive for diarrhea. Endocrine: Negative. Genitourinary: Positive for urgency. Musculoskeletal: Positive for arthralgias, back pain, gait problem and myalgias. Skin: Negative. Neurological: Positive for dizziness, weakness and numbness. Hematological: Negative. Psychiatric/Behavioral: Positive for dysphoric mood. The patient is nervous/anxious. Objective:   Physical Exam  Vitals:    11/11/21 0804   BP: (!) 135/58   Pulse: 95   Temp: 97.2 °F (36.2 °C)     weight: (!) 420 lb (190.5 kg)    Neurological Examination  Constitutional .General exam well groomed   Head/Ears /Nose/Throat: external ear . Normal exam  Neck and thyroid . Normal size. No bruits  Respiratory . Breathsounds clear bilaterally  Cardiovascular:  Auscultation of heart with regular rate and rhythm  Musculoskeletal. Muscle bulk and tone normal                                                           Muscle strength 5/5 strength throughout                                                                                No dysmetria or dysdiadokinesis  No tremor   Normal fine motor  Gait normal   Orientation Alert and oriented x 3   Attention and concentration normal  Short term memory normal  Language process and speech normal . No aphasia   Cranial nerve 2 Able to detect only light right eye  acuety and visual fields  Cranial nerve 3, 4 and 6 . Extraocular muscles are intact . Pupils are equal and reactive   Cranial nerve 5 . Normal strength of masseter and temporalis . Intact corneal reflex. Normal facial sensation  Cranial nerve 7 normal exam   Cranial nerve 8. Bilateral hearing loss  Cranial nerve 9 and 10. Symmetric palate elevation   Cranial nerve 11 , 5 out of 5 strength   Cranial Nerve 12 midline tongue . No atrophy  Sensation . Decreased pinprick and light touch distal lower extremities in stocking distributaion  Deep Tendon Reflexes hypoactive with absent ankle jerks   Plantar response flexor bilaterally    Assessment:       Diagnosis Orders   1. Diabetic polyneuropathy associated with diabetes mellitus due to underlying condition ([facility]     2. Chronic low back pain with sciatica, sciatica laterality unspecified, unspecified back pain laterality     3. Seizures ([facility]     4.  Migraine without aura and without status migrainosus, not intractable     Will readjust neurontin to 300 mg po tid keeping elavil 50 mg po qhs to use fioricet as needed       Plan:      As above         Luke Johnson MD

## 2021-11-15 ENCOUNTER — HOSPITAL ENCOUNTER (OUTPATIENT)
Age: 56
Discharge: HOME | End: 2021-11-15
Payer: COMMERCIAL

## 2021-11-15 DIAGNOSIS — N18.30 BENIGN HYPERTENSION WITH CKD (CHRONIC KIDNEY DISEASE) STAGE III (HCC): ICD-10-CM

## 2021-11-15 DIAGNOSIS — I12.9 BENIGN HYPERTENSION WITH CKD (CHRONIC KIDNEY DISEASE) STAGE III (HCC): ICD-10-CM

## 2021-11-15 DIAGNOSIS — N18.32 STAGE 3B CHRONIC KIDNEY DISEASE (HCC): ICD-10-CM

## 2021-11-15 DIAGNOSIS — I12.9 BENIGN HYPERTENSION WITH CKD (CHRONIC KIDNEY DISEASE) STAGE IV (HCC): ICD-10-CM

## 2021-11-15 DIAGNOSIS — E87.1 HYPONATREMIA: ICD-10-CM

## 2021-11-15 DIAGNOSIS — E13.22 SECONDARY DIABETES MELLITUS WITH STAGE 4 CHRONIC KIDNEY DISEASE (HCC): ICD-10-CM

## 2021-11-15 DIAGNOSIS — N18.4 BENIGN HYPERTENSION WITH CKD (CHRONIC KIDNEY DISEASE) STAGE IV (HCC): ICD-10-CM

## 2021-11-15 DIAGNOSIS — E13.22 SECONDARY DIABETES MELLITUS WITH STAGE 3 CHRONIC KIDNEY DISEASE (HCC): ICD-10-CM

## 2021-11-15 DIAGNOSIS — E83.39 HYPERPHOSPHATEMIA: ICD-10-CM

## 2021-11-15 DIAGNOSIS — N18.4 CKD (CHRONIC KIDNEY DISEASE), STAGE IV (HCC): ICD-10-CM

## 2021-11-15 DIAGNOSIS — N18.30 SECONDARY DIABETES MELLITUS WITH STAGE 3 CHRONIC KIDNEY DISEASE (HCC): ICD-10-CM

## 2021-11-15 DIAGNOSIS — N18.4 SECONDARY DIABETES MELLITUS WITH STAGE 4 CHRONIC KIDNEY DISEASE (HCC): ICD-10-CM

## 2021-11-15 LAB
-: NORMAL
ABSOLUTE EOS #: 0.54 K/UL (ref 0–0.4)
ABSOLUTE IMMATURE GRANULOCYTE: ABNORMAL K/UL (ref 0–0.3)
ABSOLUTE LYMPH #: 1.8 K/UL (ref 1–4.8)
ABSOLUTE MONO #: 1.26 K/UL (ref 0.1–1.3)
AMORPHOUS: NORMAL
ANION GAP SERPL CALCULATED.3IONS-SCNC: 15 MMOL/L (ref 9–17)
BACTERIA: NORMAL
BASOPHILS # BLD: 1 % (ref 0–2)
BASOPHILS ABSOLUTE: 0.18 K/UL (ref 0–0.2)
BILIRUBIN URINE: NEGATIVE
BUN BLDV-MCNC: 59 MG/DL (ref 6–20)
BUN/CREAT BLD: ABNORMAL (ref 9–20)
CALCIUM SERPL-MCNC: 9.5 MG/DL (ref 8.6–10.4)
CASTS UA: NORMAL /LPF
CHLORIDE BLD-SCNC: 93 MMOL/L (ref 98–107)
CO2: 27 MMOL/L (ref 20–31)
COLOR: YELLOW
COMMENT UA: NORMAL
CREAT SERPL-MCNC: 1.94 MG/DL (ref 0.7–1.2)
CRYSTALS, UA: NORMAL /HPF
DIFFERENTIAL TYPE: ABNORMAL
EOSINOPHILS RELATIVE PERCENT: 3 % (ref 0–4)
EPITHELIAL CELLS UA: NORMAL /HPF
GFR AFRICAN AMERICAN: 44 ML/MIN
GFR NON-AFRICAN AMERICAN: 36 ML/MIN
GFR SERPL CREATININE-BSD FRML MDRD: ABNORMAL ML/MIN/{1.73_M2}
GFR SERPL CREATININE-BSD FRML MDRD: ABNORMAL ML/MIN/{1.73_M2}
GLUCOSE BLD-MCNC: 219 MG/DL (ref 70–99)
GLUCOSE URINE: NEGATIVE
HCT VFR BLD CALC: 29.8 % (ref 41–53)
HEMOGLOBIN: 9.7 G/DL (ref 13.5–17.5)
IMMATURE GRANULOCYTES: ABNORMAL %
KETONES, URINE: NEGATIVE
LEUKOCYTE ESTERASE, URINE: NEGATIVE
LYMPHOCYTES # BLD: 10 % (ref 24–44)
MAGNESIUM: 2.1 MG/DL (ref 1.6–2.6)
MCH RBC QN AUTO: 27.5 PG (ref 26–34)
MCHC RBC AUTO-ENTMCNC: 32.5 G/DL (ref 31–37)
MCV RBC AUTO: 84.4 FL (ref 80–100)
MONOCYTES # BLD: 7 % (ref 1–7)
MORPHOLOGY: ABNORMAL
MORPHOLOGY: ABNORMAL
MUCUS: NORMAL
NITRITE, URINE: NEGATIVE
NRBC AUTOMATED: ABNORMAL PER 100 WBC
OTHER OBSERVATIONS UA: NORMAL
PDW BLD-RTO: 18.7 % (ref 11.5–14.9)
PH UA: 6 (ref 5–8)
PHOSPHORUS: 4.1 MG/DL (ref 2.5–4.5)
PLATELET # BLD: 252 K/UL (ref 150–450)
PLATELET ESTIMATE: ABNORMAL
PMV BLD AUTO: 8.8 FL (ref 6–12)
POTASSIUM SERPL-SCNC: 4.5 MMOL/L (ref 3.7–5.3)
PROTEIN UA: NEGATIVE
RBC # BLD: 3.53 M/UL (ref 4.5–5.9)
RBC # BLD: ABNORMAL 10*6/UL
RBC UA: NORMAL /HPF
RENAL EPITHELIAL, UA: NORMAL /HPF
SEG NEUTROPHILS: 79 % (ref 36–66)
SEGMENTED NEUTROPHILS ABSOLUTE COUNT: 14.22 K/UL (ref 1.3–9.1)
SODIUM BLD-SCNC: 135 MMOL/L (ref 135–144)
SPECIFIC GRAVITY UA: 1.02 (ref 1–1.03)
TRICHOMONAS: NORMAL
TURBIDITY: CLEAR
URINE HGB: NEGATIVE
UROBILINOGEN, URINE: NORMAL
WBC # BLD: 18 K/UL (ref 3.5–11)
WBC # BLD: ABNORMAL 10*3/UL
WBC UA: NORMAL /HPF
YEAST: NORMAL

## 2021-11-15 PROCEDURE — 83735 ASSAY OF MAGNESIUM: CPT

## 2021-11-15 PROCEDURE — 36415 COLL VENOUS BLD VENIPUNCTURE: CPT

## 2021-11-15 PROCEDURE — 84100 ASSAY OF PHOSPHORUS: CPT

## 2021-11-15 PROCEDURE — 85025 COMPLETE CBC W/AUTO DIFF WBC: CPT

## 2021-11-15 PROCEDURE — 80048 BASIC METABOLIC PNL TOTAL CA: CPT

## 2021-11-15 PROCEDURE — 81001 URINALYSIS AUTO W/SCOPE: CPT

## 2021-11-17 ENCOUNTER — PATIENT MESSAGE (OUTPATIENT)
Dept: FAMILY MEDICINE CLINIC | Age: 56
End: 2021-11-17

## 2021-11-17 ENCOUNTER — TELEPHONE (OUTPATIENT)
Dept: PHARMACY | Age: 56
End: 2021-11-17

## 2021-11-17 DIAGNOSIS — I50.32 CHRONIC DIASTOLIC CONGESTIVE HEART FAILURE (HCC): ICD-10-CM

## 2021-11-17 DIAGNOSIS — E83.42 HYPOMAGNESEMIA: ICD-10-CM

## 2021-11-17 DIAGNOSIS — I10 ESSENTIAL HYPERTENSION: ICD-10-CM

## 2021-11-17 RX ORDER — METOPROLOL TARTRATE 50 MG/1
50 TABLET, FILM COATED ORAL 2 TIMES DAILY
Qty: 180 TABLET | Refills: 3 | Status: SHIPPED | OUTPATIENT
Start: 2021-11-17

## 2021-11-17 RX ORDER — LANOLIN ALCOHOL/MO/W.PET/CERES
400 CREAM (GRAM) TOPICAL DAILY
Qty: 90 TABLET | Refills: 3 | Status: SHIPPED | OUTPATIENT
Start: 2021-11-17 | End: 2021-11-17 | Stop reason: SDUPTHER

## 2021-11-17 RX ORDER — LANOLIN ALCOHOL/MO/W.PET/CERES
400 CREAM (GRAM) TOPICAL DAILY
Qty: 90 TABLET | Refills: 3 | Status: SHIPPED
Start: 2021-11-17 | End: 2021-12-15 | Stop reason: SDUPTHER

## 2021-11-17 NOTE — TELEPHONE ENCOUNTER
Please Approve or Refuse. Send to Pharmacy per Pt's Request:      Next Visit Date:  3/2/2022   Last Visit Date: 10/21/2021    Hemoglobin A1C (%)   Date Value   10/23/2021 7.7 (H)   07/20/2021 7.6   04/23/2021 8.0             ( goal A1C is < 7)   BP Readings from Last 3 Encounters:   11/11/21 (!) 135/58   11/01/21 (!) 108/48   10/14/21 138/88          (goal 120/80)  BUN   Date Value Ref Range Status   11/15/2021 59 (H) 6 - 20 mg/dL Final     CREATININE   Date Value Ref Range Status   11/15/2021 1.94 (H) 0.70 - 1.20 mg/dL Final     Potassium   Date Value Ref Range Status   11/15/2021 4.5 3.7 - 5.3 mmol/L Final     Comment:     SPECIMEN SLIGHTLY HEMOLYZED, RESULTS MAY BE ADVERSELY AFFECTED.

## 2021-11-17 NOTE — TELEPHONE ENCOUNTER
Called patient to check on how blood sugar control was going. Patient indicates he is in extreme pain and unable to get out of bed. States he had pain start about 3 days ago but has increased in intensity. Sharp pain in left lower stomach into groin. Patient only taking normal pain medication and not taking anything extra for pain. States he has not been eating almost anything due to pain increasing with eating. Has continued to check blood sugar three times a day even if not eating. Has not been taking his insulin unless he eats. Took BS today and was 314 with last food intake last night. States BS have been running in high 200's during the day but going hypoglycemic down to 50's late at night/ early am.      Patient did see Dr. Nandini Lema (endocrinologist) on 11/15. His insulin was adjusted to . 55 in the morning; .55 with lunch and .35 with dinner. Dr. Nandini Lema indicated to patient he had to eat. Patient understands importance of eating but states it is not possible with current pain/condition. Last meal was very small with broth and crackers last night. Patient reports taking the .35 of insulin (U-500) but also reports BS going low into 50's about 2am.      Prior to pain / prior to 11/15 patient reports BS running low 100's (fasting); mid/high 100's before lunch and still going low in late evening/ early am hours. Patient states he was told to resume Bumex at higher dose and fluid/edema has reduced but as this occurred is when pain increased. Instructed patient to reduce dinner/evening insulin to .25. Also encouraged him to try to eat earlier in the day. Understand that he may only eat once a day but may help to make this more mid/early day rather than waiting to late. Encouraged patient to call physician or get checked out in ED with extreme pain. Patient still have in person follow up 12/13 but will follow up via phone in a couple days.       Yesenia Rizvi, MONTSE,Pharm. D,, BCPS, [facility]  11/17/2021  3:55 PM

## 2021-11-17 NOTE — TELEPHONE ENCOUNTER
From: Baldo Galvan. To: Dr. Dickerson Orange: 11/17/2021 11:43 AM EST  Subject: Medicine refill    Can you please refill my MAGNESIUM. OxIDE 400 MG TABLE.  And my METOPROLOL TARTRATE 50 MG TAB Thank you

## 2021-11-21 LAB
CULTURE: NORMAL
DIRECT EXAM: NORMAL
Lab: NORMAL
SPECIMEN DESCRIPTION: NORMAL

## 2021-11-24 ENCOUNTER — APPOINTMENT (OUTPATIENT)
Dept: GENERAL RADIOLOGY | Age: 56
End: 2021-11-24
Payer: COMMERCIAL

## 2021-11-24 ENCOUNTER — HOSPITAL ENCOUNTER (EMERGENCY)
Age: 56
Discharge: HOME | End: 2021-11-24
Attending: EMERGENCY MEDICINE
Payer: COMMERCIAL

## 2021-11-24 VITALS
SYSTOLIC BLOOD PRESSURE: 150 MMHG | TEMPERATURE: 98.2 F | RESPIRATION RATE: 20 BRPM | OXYGEN SATURATION: 97 % | DIASTOLIC BLOOD PRESSURE: 60 MMHG | HEART RATE: 94 BPM

## 2021-11-24 DIAGNOSIS — S63.502A SPRAIN OF LEFT WRIST, INITIAL ENCOUNTER: ICD-10-CM

## 2021-11-24 DIAGNOSIS — S63.611A SPRAIN OF LEFT INDEX FINGER, UNSPECIFIED SITE OF DIGIT, INITIAL ENCOUNTER: Primary | ICD-10-CM

## 2021-11-24 PROCEDURE — 6370000000 HC RX 637 (ALT 250 FOR IP): Performed by: PHYSICIAN ASSISTANT

## 2021-11-24 PROCEDURE — 99285 EMERGENCY DEPT VISIT HI MDM: CPT

## 2021-11-24 PROCEDURE — 73130 X-RAY EXAM OF HAND: CPT

## 2021-11-24 PROCEDURE — 73110 X-RAY EXAM OF WRIST: CPT

## 2021-11-24 RX ORDER — HYDROCODONE BITARTRATE AND ACETAMINOPHEN 5; 325 MG/1; MG/1
1 TABLET ORAL ONCE
Status: COMPLETED | OUTPATIENT
Start: 2021-11-24 | End: 2021-11-24

## 2021-11-24 RX ADMIN — HYDROCODONE BITARTRATE AND ACETAMINOPHEN 1 TABLET: 5; 325 TABLET ORAL at 13:44

## 2021-11-24 ASSESSMENT — PAIN SCALES - GENERAL: PAINLEVEL_OUTOF10: 10

## 2021-11-24 NOTE — ED PROVIDER NOTES
[address] ED  eMERGENCY dEPARTMENT eNCOUnter   Independent Attestation     Pt Name: Lilliam Martínez MRN: [id number]  Kaygfnabil 1965  Date of evaluation: 11/24/21       Lilliam Martínez is a 63 y.o. male who presents with Finger Injury        Based on the medical record, the care appears appropriate. I was personally available for consultation in the Emergency Department.     Daniel Rogers MD  Attending Emergency  Physician                 Daniel Rogers MD  11/24/21 4221

## 2021-11-24 NOTE — ED PROVIDER NOTES
[address] ED  eMERGENCY dEPARTMENT eNCOUnter      Pt Name: Marco Mendez MRN: [id number]  Birthdate 1965  Date of evaluation: 11/24/2021  Provider: Rolly Bolton PA-C    CHIEF COMPLAINT       Chief Complaint   Patient presents with    Finger Injury           HISTORY OF PRESENT ILLNESS  (Location/Symptom, Timing/Onset, Context/Setting, Quality, Duration, Modifying Factors, Severity.)   Marco Mendez is a 63 y.o. male who presents to the emergency department for evaluation of left hand and wrist injury. Patient states he was getting out of bed this morning and tripped and fell. Patient try to catch his fall on the table and states his second finger hyperextended. Patient never fell to the ground. Denies hitting head or loss of consciousness. Currently is complaining of severe pain in the index finger. Patient also has some pain in the wrist.  Denies any numbness or tingling. He is right-handed. No other complaints. Nursing Notes were reviewed. REVIEW OF SYSTEMS    (2-9 systems for level 4, 10 or more for level 5)     Review of Systems   Fall, finger, wrist pain    Except as noted above the remainder of the review of systems was reviewed and negative.        PAST MEDICAL HISTORY     Past Medical History:   Diagnosis Date    Acute kidney injury superimposed on CKD ([address] 4/10/2013    Acute on chronic congestive heart failure ([address]     Acute on chronic kidney failure ([address] 07/20/2017    Acute on chronic respiratory failure ([address] 10/02/2018    Acute on chronic respiratory failure with hypoxia ([address] 9/30/2021    Adhesive capsulitis of left shoulder 03/25/2017    Anemia, normocytic normochromic 9/12/2021    Anxiety 10/02/2016    smokes marijuana for this    Arthropathy, unspecified, other specified sites 06/13/2013    Asthma     B12 deficiency     Bilateral lower leg cellulitis 02/17/2016    Blood in stool     CAD (coronary artery disease)     Cardiovascular stress test abnormal 2018    Cellulitis of both lower extremities 05/25/2017    Cellulitis of leg, left 07/20/2017    CHF (congestive heart failure), NYHA class III (HCC) 08/14/2013    Chronic back pain     Chronic bronchitis (HCC)     Chronic headaches     was referred to neuro, testing scheduled    Chronic infective otitis externa 4/28/2017    Chronic malignant otitis externa of both ears 7/24/2020    Chronic respiratory failure (HCC)     was on vent    Chronic ulcer of left leg, with fat layer exposed ([address] 02/22/2019    healed    Class 2 severe obesity due to excess calories with serious comorbidity and body mass index (BMI) of 35.0 to 35.9 in adult ([address]     (BMI 35.0-39.9 without comorbidity)    COPD exacerbation ([address] 11/02/2016    Diabetic neuropathy ([address] 08/14/2013    Displacement of lumbar intervertebral disc without myelopathy 06/13/2013    Ear infection     RIGHT    Elevated troponin     Essential hypertension     Facial cellulitis 2012    Fall 03/25/2017    GERD (gastroesophageal reflux disease)     Head injury     Hearing loss in right ear     pencil pierced ear as a child    Hepatic steatosis 12/03/2015    History of general anesthesia complication     has woke up during surgery under anesthesia    History of rib fracture 12/03/2015    Chronic     Hyperlipidemia     Hypersomnia     can go multiple days without sleeping    Hypertension     Insomnia     Intolerance of continuous positive airway pressure (CPAP) ventilation 07/20/2017    Iron deficiency     Localized rash     gets frequently in axilla, groin, in any fold, on several topical treatments for this    Lymphedema of both lower extremities 4/27/2021    Magnesium deficiency     Mastoiditis of left side     Mixed conductive and sensorineural hearing loss of both ears 01/10/2017    Per ENT    Mixed type COPD (chronic obstructive pulmonary disease) ([address]     On home O2, multiple inhlaers, nebulizer    Moderate recurrent major depression ([address] 10/02/2016    Morbid obesity with BMI of 45.0-49.9, adult ([address] 06/16/2015    NSTEMI (non-ST elevated myocardial infarction) ([address]     On home oxygen therapy     3 Lpm prn    Open wound of groin 12/19/2018    healed     BARBY on CPAP     Osteoarthritis     Otitis externa of left ear     Pancreatitis chronic     Persistent depressive disorder 11/19/2019    Renal insufficiency     proteinuria    Seizures ([address] 6/27/2019    Severe depression ([address] 09/25/2013    Spinal stenosis of lumbar region without neurogenic claudication 01/06/2016    MRI lumbar 12/30/15 L3-L4: There is broad-based bulging disc which appears protruding left laterally causing flattening of the ventral thecal sac. In addition, there is facet arthropathy with mild hypertrophic changes.  There is borderline central canal stenosis with  evidence of moderate left neural foraminal narrowing and mild right neural foramina narrowing.   L4-L5: There is broad-based protrud    Syncope 04/28/2017    Tinnitus of both ears 01/10/2017    Per ENT    Type 2 diabetes mellitus with stage 3 chronic kidney disease, with long-term current use of insulin ([address] 12/26/2016    due to underlying condition with hyperosmolarity without coma    Type II or unspecified type diabetes mellitus without mention of complication, not stated as uncontrolled     uncontrolled    Uncontrolled type 2 diabetes mellitus with hyperglycemia ([address] 7/15/2013    Unstable angina ([address] 9/11/2021    Vitamin D deficiency     Wears glasses     for reading     None otherwise stated in nurses notes    SURGICAL HISTORY       Past Surgical History:   Procedure Laterality Date    BACK SURGERY   (x 4) 2000,.12/2011.2/2012     Dr Arzola Ni last 2 surg    CARDIAC CATHETERIZATION  04/23/2018    PATENT OM STENT    COLONOSCOPY  11/03/2015    hemorrhoids, poor prep, not done    COLONOSCOPY  2013    COLONOSCOPY N/A 04/12/2021    COLONOSCOPY POLYPECTOMY SNARE/COLD BIOPSY/HOT BIOPSY/CLIP APPLICATION X1 performed by Lorenzo Wang MD at [address] [facility]  03/2013    x 1    HAND TENDON SURGERY Left     thumb tendon repair    INTRACAPSULAR CATARACT EXTRACTION Right 11/05/2019    EYE CATARACT EMULSIFICATION IOL IMPLANT performed by Jose Coburn MD at [facility] [facility] Left 01/07/2020    EYE CATARACT EMULSIFICATION IOL IMPLANT performed by [facility]urn MD [address] ARTHROSCOPY Left     NERVE BLOCK  07/31/2015    TENS unit    MD ESOPHAGOGASTRODUODENOSCOPY TRANSORAL DIAGNOSTIC N/A 07/18/2018    EGD ESOPHAGOGASTRODUODENOSCOPY performed by Lorenzo Wang MD at [facility] Bilateral 09/20/2012    Dr Genaro Kim     None otherwise stated in nurses notes    CURRENT MEDICATIONS       Previous Medications    ALBUTEROL (PROVENTIL) (2.5 MG/3ML) 0.083% NEBULIZER SOLUTION    Take 3 mLs by nebulization every 6 hours as needed for Wheezing or Shortness of Breath    ALLOPURINOL (ZYLOPRIM) 100 MG TABLET    Take 1 tablet by mouth daily FOR GOUT. STOP IT IF ANY RASH DEVELOPS! AMITRIPTYLINE (ELAVIL) 50 MG TABLET    Take 1 po qhs    AMMONIUM LACTATE (LAC-HYDRIN) 12 % LOTION    Apply topically daily. ASPIRIN 81 MG EC TABLET    Take 81 mg by mouth daily. BLOOD GLUCOSE MONITOR STRIPS    Test 3 times a day & as needed for symptoms of irregular blood glucose. Dispense sufficient amount for indicated testing frequency plus additional to accommodate PRN testing needs.  One touch Ultra blue    BUMETANIDE (BUMEX) 1 MG TABLET    Take 1 tablet by mouth 2 times daily Dose decreased 10/23/2021    BUTALBITAL-ACETAMINOPHEN-CAFFEINE (FIORICET, ESGIC) -40 MG PER TABLET    Take 1 tablet by mouth every 8 hours as needed for Headaches    CLOBETASOL (TEMOVATE) 0.05 % OINTMENT    Apply topically 2 times daily for psoriasis    CLOPIDOGREL (PLAVIX) 75 MG TABLET    Take 1 tablet by mouth daily    CONTINUOUS BLOOD GLUC SENSOR (FREESTYLE CRISTINE 2 SENSOR) [facility]    Patient to apply a new sensor every 14 days. RX to cover voucher patient will bring in. DOCUSATE SODIUM (COLACE) 100 MG CAPSULE    Take 1 capsule by mouth 2 times daily For constipation    EPOETIN CLAIRE-EPBX (RETACRIT) 3000 UNIT/ML SOLN INJECTION    Inject 1 mL into the skin three times a week    FERROUS SULFATE (IRON) 325 (65 FE) MG TABS    Take 1 tablet by mouth daily    FLUTICASONE (CUTIVATE) 0.05 % CREAM    Apply topically 2 times daily     FLUTICASONE (FLONASE) 50 MCG/ACT NASAL SPRAY    2 sprays by Nasal route daily    FLUTICASONE-SALMETEROL (ADVAIR HFA) 230-21 MCG/ACT INHALER    Inhale 2 puffs into the lungs 2 times daily    GABAPENTIN (NEURONTIN) 300 MG CAPSULE    TAKE ONE CAPSULE BY MOUTH THREE TIMES PER DAY    GAUZE BANDAGES (ROLLED GAUZE BANDAGE 4\"X2. 5YD) MISC    For twice a day dressing    GAUZE PADS & DRESSINGS 4\"X4\" PADS    Twice a day dressing of right leg wound    HANDICAP PLACARD MIS    by Does not apply route Can't walk greater than 200 feet. Expires in 5 years. INSULIN REGULAR HUMAN (HUMULIN R) 500 UNIT/ML CONCENTRATED INJECTION VIAL    Patient using 0.52ml with breakfast, 0.52ml with lunch and 0.45ml with dinner. INSULIN SYRINGE-NEEDLE U-100 31G X 5/16\" 1 ML MISC    Use to subcutaneously inject insulin three times daily    KETOCONAZOLE (NIZORAL) 2 % CREAM    Apply twice a day for yeast infection in the skin folds, for 4 weeks    LANCET DEVICES (LANCING DEVICE) MISC    Provide patient with lancing device appropriate for his machine/lancing needles. LANCETS MISC    Use to check blood sugar three times daily along with when necessary due to symptoms.     LATANOPROST (XALATAN) 0.005 % OPHTHALMIC SOLUTION    1 drop nightly    LISINOPRIL (PRINIVIL;ZESTRIL) 5 MG TABLET    Take 5 mg by mouth daily    MAGNESIUM OXIDE (MAG-OX) 400 (240 MG) MG TABLET    Take 1 tablet by mouth daily    MELATONIN 10 MG TABS    Take 10 mg tablet by mouth daily       ALLERGIES     Levofloxacin, Lorazepam, Nsaids, Prozac [fluoxetine hcl], and Vancomycin    FAMILY HISTORY           Problem Relation Age of Onset    Heart Disease Mother          age 64 from MI   Alondra Lobe High Blood Pressure Mother     Diabetes Mother     High Blood Pressure Father          age 80 from CKD and Lung Fibrosis    Kidney Disease Father     Heart Disease Sister     Heart Attack Sister     Obesity Sister     Diabetes Sister      Family Status   Relation Name Status    Mother  (Not Specified)    Father  (Not Specified)    Sister  (Not Specified)      None otherwise stated in nurses notes    SOCIAL HISTORY      reports that he quit smoking about 12 months ago. His smoking use included cigarettes. He started smoking about 36 years ago. He has a 8.25 pack-year smoking history. He quit smokeless tobacco use about 26 years ago. His smokeless tobacco use included snuff. He reports previous drug use. Drug: Marijuana Charmayne Stai). He reports that he does not drink alcohol. lives at home with others     PHYSICAL EXAM    (up to 7 for level 4, 8 or more for level 5)     ED Triage Vitals [21 1305]   BP Temp Temp Source Pulse Resp SpO2 Height Weight   (!) 150/60 98.2 °F (36.8 °C) Oral 94 20 97 % -- --       Physical Exam   Nursing note and vitals reviewed. Constitutional: well-developed, well-nourished, nontoxic, well appearing, not distressed  HEENT:  normocephalic atraumatic, external ears normal appearance, no nasal deformity, no neck masses or edema, patient protecting airway, no stridor, phonating well  Eyes: pupils equal, sclera non-icteric, no discharge  Cardiovascular: no JVD  Respiratory: non-labored breathing, effort normal, no accessory muscle use visulized, no audible wheezing  Gastrointestinal: Abdomen not distended  Musculoskeletal: examination of the left hand and wrist reveals swelling over the second finger.   There is no bruising, erythema, abrasions, deformities. Patient has tenderness over the second finger, second metacarpal, ulnar side of the wrist.  Patient has decreased range of motion of the second finger with flexion and extension. Unable to make a full tight. There is no snuffbox tenderness. 2/2 radial pulse. Brisk cap refill. Distal sensation intact. Skin: no pallor, no rashes visible  Neuro: alert and oriented times 3, GCS 15, normal coordination, no dysarthria or aphasia  Psych: normal mood and affect, cooperative, normal thought content            DIAGNOSTIC RESULTS     EKG: All EKG's are interpreted by the Emergency Department Physician who either signs or Co-signs this chart in the absence of a cardiologist.        RADIOLOGY:   All plain film, CT, MRI, and formal ultrasound images (except ED bedside ultrasound) are read by the radiologist, see reports below, unless otherwise noted in MDM or here. XR WRIST LEFT (MIN 3 VIEWS)   Final Result   Left wrist:      1. Subcortical cystic changes in the carpal bones. Mild edema in the soft   tissues about the left wrist.   2. No acute fracture or dislocation. Left hand:      1. Mild soft tissue edema about the left wrist.  Subcortical cystic changes   in the lunate and triquetrum. 2. No acute fracture or dislocation. XR HAND LEFT (MIN 3 VIEWS)   Final Result   Left wrist:      1. Subcortical cystic changes in the carpal bones. Mild edema in the soft   tissues about the left wrist.   2. No acute fracture or dislocation. Left hand:      1. Mild soft tissue edema about the left wrist.  Subcortical cystic changes   in the lunate and triquetrum. 2. No acute fracture or dislocation. No results found. LABS:  Labs Reviewed - No data to display    All other labs were within normal range or not returned as of this dictation.     EMERGENCY DEPARTMENT COURSE and DIFFERENTIAL DIAGNOSIS/MDM:   Vitals:    Vitals:    11/24/21 1305   BP: (!) 150/60   Pulse: 94   Resp: 20   Temp: 68710  [phone]    Call           Final Impression:   1. Sprain of left index finger, unspecified site of digit, initial encounter    2.  Sprain of left wrist, initial encounter               (Please note that portions of this note were completed with a voice recognition program )        Tila Felipe [address], PA-C  11/24/21 1406

## 2021-11-26 ENCOUNTER — TELEPHONE (OUTPATIENT)
Dept: FAMILY MEDICINE CLINIC | Age: 56
End: 2021-11-26

## 2021-11-26 NOTE — TELEPHONE ENCOUNTER
[facility] ([facility]) ED Follow up Call    Reason for ED visit:           Shyam Rivera , this is Gretchen Elian from Dr. Marc So office, just calling to see how you are doing after your recent ED visit. Did you receive discharge instructions? Yes  Do you understand the discharge instructions? Yes  Did the ED give you any new prescriptions? No  Were you able to fill your prescriptions? no      Do you have one of our red, yellow and green  Zone sheets that help you to determine when you should go to the ED? Yes      Do you need or want to make a follow up appt with your PCP? No    Do you have any further needs in the home i.e. Equipment?   Not Applicable        FU appts/Provider:    Future Appointments   Date Time Provider William Hernandez   11/30/2021  9:30 AM YECENIA Cullen [facility]   12/13/2021 10:10 AM [facility] MEDICATION [facility]   1/4/2022  1:30 PM Richie San MD [facility] Renal[facility]rv [facility]   2/11/2022  8:20 AM Fam Enciso MD [facility] NEURO [facility]   3/2/2022  8:30 AM Bora Torrez MD fp sc [facility]   3/17/2022  8:00 AM Prudence Thayer MD [facility]   3/22/2022 10:00 AM Jitendra Ashley MD [address] [place] [address]

## 2021-11-30 ENCOUNTER — OFFICE VISIT (OUTPATIENT)
Dept: ORTHOPEDIC SURGERY | Age: 56
End: 2021-11-30
Payer: COMMERCIAL

## 2021-11-30 VITALS — HEIGHT: 72 IN | RESPIRATION RATE: 14 BRPM | WEIGHT: 315 LBS | BODY MASS INDEX: 42.66 KG/M2

## 2021-11-30 DIAGNOSIS — S63.611A SPRAIN OF LEFT INDEX FINGER, INITIAL ENCOUNTER: Primary | ICD-10-CM

## 2021-11-30 PROCEDURE — G8417 CALC BMI ABV UP PARAM F/U: HCPCS | Performed by: PHYSICIAN ASSISTANT

## 2021-11-30 PROCEDURE — 1036F TOBACCO NON-USER: CPT | Performed by: PHYSICIAN ASSISTANT

## 2021-11-30 PROCEDURE — 3017F COLORECTAL CA SCREEN DOC REV: CPT | Performed by: PHYSICIAN ASSISTANT

## 2021-11-30 PROCEDURE — G8427 DOCREV CUR MEDS BY ELIG CLIN: HCPCS | Performed by: PHYSICIAN ASSISTANT

## 2021-11-30 PROCEDURE — 99203 OFFICE O/P NEW LOW 30 MIN: CPT | Performed by: PHYSICIAN ASSISTANT

## 2021-11-30 PROCEDURE — G8482 FLU IMMUNIZE ORDER/ADMIN: HCPCS | Performed by: PHYSICIAN ASSISTANT

## 2021-11-30 NOTE — PROGRESS NOTES
[address] [facility], [address] [facility]           Dept Phone: [phone]           Dept Fax:  [phone]      Chief Compliant:  Chief Complaint   Patient presents with    Finger Pain     left hand index        History of Present Illness: This is a 63 y.o. male who presents to the clinic today for evaluation of left index finger pain. Patient states on 641111 he woke up at night and tripped falling on his left hand. Main pain when he woke up was in his left finger at the proximal phalanx of the left index finger. Patient went to the emergency room and was evaluated and discharged with finger sprain in the left with a follow-up in our office    Patient states that he still having very bad pain, he has been wearing his finger splint, he is having difficulty with range of motion of the left index finger. Past History:    Current Outpatient Medications:     metoprolol tartrate (LOPRESSOR) 50 MG tablet, Take 1 tablet by mouth 2 times daily, Disp: 180 tablet, Rfl: 3    magnesium oxide (MAG-OX) 400 (240 Mg) MG tablet, Take 1 tablet by mouth daily, Disp: 90 tablet, Rfl: 3    MUCUS RELIEF 600 MG extended release tablet, , Disp: , Rfl:     tiZANidine (ZANAFLEX) 4 MG tablet, , Disp: , Rfl:     lisinopril (PRINIVIL;ZESTRIL) 5 MG tablet, Take 5 mg by mouth daily, Disp: , Rfl:     gabapentin (NEURONTIN) 300 MG capsule, TAKE ONE CAPSULE BY MOUTH THREE TIMES PER DAY, Disp: 90 capsule, Rfl: 2    butalbital-acetaminophen-caffeine (FIORICET, ESGIC) -40 MG per tablet, Take 1 tablet by mouth every 8 hours as needed for Headaches, Disp: 60 tablet, Rfl: 1    amitriptyline (ELAVIL) 50 MG tablet, Take 1 po qhs, Disp: 30 tablet, Rfl: 5    oxyCODONE HCl (OXY-IR) 10 MG immediate release tablet, Take 1 tablet by mouth every 8 hours as needed for Pain for up to 30 days. , Disp: 90 tablet, Rfl: 0   blood glucose monitor strips, Test 3 times a day & as needed for symptoms of irregular blood glucose. Dispense sufficient amount for indicated testing frequency plus additional to accommodate PRN testing needs. One touch Ultra blue, Disp: 300 strip, Rfl: 3    bumetanide (BUMEX) 1 MG tablet, Take 1 tablet by mouth 2 times daily Dose decreased 10/23/2021, Disp: 180 tablet, Rfl: 0    neomycin-polymyxin-hydrocortisone (CORTISPORIN) 3.5-47932-3 otic suspension, Place 3 drops into the right ear 4 times daily For 10 days, Disp: 10 mL, Rfl: 0    ammonium lactate (LAC-HYDRIN) 12 % lotion, Apply topically daily. , Disp: 222 mL, Rfl: 0    venlafaxine (EFFEXOR XR) 75 MG extended release capsule, TAKE ONE CAPSULE BY MOUTH DAILY WITH FOOD, Disp: 90 capsule, Rfl: 3    ROCKLATAN 0.02-0.005 % SOLN, , Disp: , Rfl:     nystatin (MYCOSTATIN) 082547 UNIT/ML suspension, Take 5 mLs by mouth 4 times daily Swish and swallow, Disp: 240 mL, Rfl: 0    epoetin leslie-epbx (RETACRIT) 3000 UNIT/ML SOLN injection, Inject 1 mL into the skin three times a week, Disp: 21.9 mL, Rfl: 0    nicotine (NICODERM CQ) 21 MG/24HR, Place 1 patch onto the skin daily, Disp: 30 patch, Rfl: 3    Insulin Syringe-Needle U-100 31G X 5/16\" 1 ML MISC, Use to subcutaneously inject insulin three times daily, Disp: 300 each, Rfl: 2    allopurinol (ZYLOPRIM) 100 MG tablet, Take 1 tablet by mouth daily FOR GOUT.  STOP IT IF ANY RASH DEVELOPS!, Disp: 90 tablet, Rfl: 3    vitamin D3 (CHOLECALCIFEROL) 25 MCG (1000 UT) TABS tablet, Take 1 tablet by mouth daily, Disp: 90 tablet, Rfl: 1    docusate sodium (COLACE) 100 MG capsule, Take 1 capsule by mouth 2 times daily For constipation, Disp: 180 capsule, Rfl: 3    latanoprost (XALATAN) 0.005 % ophthalmic solution, 1 drop nightly, Disp: , Rfl:     clopidogrel (PLAVIX) 75 MG tablet, Take 1 tablet by mouth daily, Disp: 90 tablet, Rfl: 3    nitroGLYCERIN (NITROSTAT) 0.4 MG SL tablet, DISSOLVE 1 TAB UNDER TONGUE FOR CHEST PAIN - IF PAIN REMAINS AFTER 5 MIN, CALL 911 AND REPEAT DOSE. MAX 3 TABS IN 15 MINUTES, Disp: 25 tablet, Rfl: 2    pantoprazole (PROTONIX) 40 MG tablet, Take 1 tablet by mouth every morning (before breakfast), Disp: 90 tablet, Rfl: 1    insulin regular human (HUMULIN R) 500 UNIT/ML concentrated injection vial, Patient using 0.52ml with breakfast, 0.52ml with lunch and 0.45ml with dinner. (Patient taking differently: Patient using 0.52ml with breakfast, 0.52ml with lunch and 0.40 ml with dinner.), Disp: 60 mL, Rfl: 3    rosuvastatin (CRESTOR) 10 MG tablet, Take 1 tablet by mouth nightly Stop pravastatin, Disp: 90 tablet, Rfl: 3    Continuous Blood Gluc Sensor (FREESTYLE CRISTINE 2 SENSOR) [facility], Patient to apply a new sensor every 14 days. RX to cover voucher patient will bring in., Disp: 2 each, Rfl: 0    Gauze Pads & Dressings 4\"X4\" PADS, Twice a day dressing of right leg wound, Disp: 60 each, Rfl: 5    Gauze Bandages (ROLLED GAUZE BANDAGE 4\"X2. 5YD) MISC, For twice a day dressing, Disp: 60 each, Rfl: 5    PROAIR  (90 Base) MCG/ACT inhaler, INHALE TWO PUFFS BY MOUTH EVERY 6 HOURS AS NEEDED FOR WHEEZING OR FOR SHORTNESS OF BREATH, Disp: 8.5 g, Rfl: 3    nystatin (MYCOSTATIN) 252649 UNIT/GM powder, Apply 2 times daily in the skin folds for several months, Disp: 1 Bottle, Rfl: 3    clobetasol (TEMOVATE) 0.05 % ointment, Apply topically 2 times daily for psoriasis, Disp: 1 Tube, Rfl: 3    ketoconazole (NIZORAL) 2 % cream, Apply twice a day for yeast infection in the skin folds, for 4 weeks, Disp: 1 Tube, Rfl: 3    albuterol (PROVENTIL) (2.5 MG/3ML) 0.083% nebulizer solution, Take 3 mLs by nebulization every 6 hours as needed for Wheezing or Shortness of Breath, Disp: 120 vial, Rfl: 3    Ferrous Sulfate (IRON) 325 (65 Fe) MG TABS, Take 1 tablet by mouth daily, Disp: 90 tablet, Rfl: 3    fluticasone-salmeterol (ADVAIR HFA) 230-21 MCG/ACT inhaler, Inhale 2 puffs into the lungs 2 times daily, Disp: 12 g, vancomycin \"a number of times\" before without issue. couldn't breath and talk, throat closed     Social History     Socioeconomic History    Marital status:      Spouse name: Reymundo Jean Number of children: 11    Years of education: Not on file    Highest education level: Not on file   Occupational History    Occupation: disability     Comment: unemployed   Tobacco Use    Smoking status: Former Smoker     Packs/day: 0.25     Years: 33.00     Pack years: 8.25     Types: Cigarettes     Start date: 1985     Quit date: 2020     Years since quittin.0    Smokeless tobacco: Former User     Types: Snuff     Quit date: 1995   Vaping Use    Vaping Use: Never used   Substance and Sexual Activity    Alcohol use: No     Alcohol/week: 0.0 standard drinks    Drug use: Not Currently     Types: Marijuana Nicolette Yu)     Comment: Patient reports he quit using THC for 26 days on 2021    Sexual activity: Not Currently     Partners: Female   Other Topics Concern    Not on file   Social History Narrative    Middle of possible separation 2016          Social Determinants of Health     Financial Resource Strain: Low Risk     Difficulty of Paying Living Expenses: Not hard at all   Food Insecurity: No Food Insecurity    Worried About [facility] in the Last Year: Never true    Basil of Food in the Last Year: Never true   Transportation Needs: No Transportation Needs    Lack of Transportation (Medical): No    Lack of Transportation (Non-Medical):  No   Physical Activity:     Days of Exercise per Week: Not on file    Minutes of Exercise per Session: Not on file   Stress:     Feeling of Stress : Not on file   Social Connections:     Frequency of Communication with Friends and Family: Not on file    Frequency of Social Gatherings with Friends and Family: Not on file    Attends Anglican Services: Not on file    Active Member of Clubs or Organizations: Not on file    Attends Club or Organization Meetings: Not on file    Marital Status: Not on file   Intimate Partner Violence:     Fear of Current or Ex-Partner: Not on file    Emotionally Abused: Not on file    Physically Abused: Not on file    Sexually Abused: Not on file   Housing Stability: Unknown    Unable to Pay for Housing in the Last Year: No    Number of [place] in the Last Year: Not on file    Unstable Housing in the Last Year: No     Past Medical History:   Diagnosis Date    Acute kidney injury superimposed on CKD ([address] 4/10/2013    Acute on chronic congestive heart failure ([address]     Acute on chronic kidney failure ([address] 07/20/2017    Acute on chronic respiratory failure ([address] 10/02/2018    Acute on chronic respiratory failure with hypoxia ([address] 9/30/2021    Adhesive capsulitis of left shoulder 03/25/2017    Anemia, normocytic normochromic 9/12/2021    Anxiety 10/02/2016    smokes marijuana for this    Arthropathy, unspecified, other specified sites 06/13/2013    Asthma     B12 deficiency     Bilateral lower leg cellulitis 02/17/2016    Blood in stool     CAD (coronary artery disease)     Cardiovascular stress test abnormal 2018    Cellulitis of both lower extremities 05/25/2017    Cellulitis of leg, left 07/20/2017    CHF (congestive heart failure), NYHA class III (HCC) 08/14/2013    Chronic back pain     Chronic bronchitis (HCC)     Chronic headaches     was referred to neuro, testing scheduled    Chronic infective otitis externa 4/28/2017    Chronic malignant otitis externa of both ears 7/24/2020    Chronic respiratory failure (HCC)     was on vent    Chronic ulcer of left leg, with fat layer exposed ([address] 02/22/2019    healed    Class 2 severe obesity due to excess calories with serious comorbidity and body mass index (BMI) of 35.0 to 35.9 in adult ([address]     (BMI 35.0-39.9 without comorbidity)    COPD exacerbation ([address] 11/02/2016    Diabetic neuropathy ([address] 08/14/2013    Displacement of lumbar intervertebral disc without myelopathy 06/13/2013    Ear infection     RIGHT    Elevated troponin     Essential hypertension     Facial cellulitis 2012    Fall 03/25/2017    GERD (gastroesophageal reflux disease)     Head injury     Hearing loss in right ear     pencil pierced ear as a child    Hepatic steatosis 12/03/2015    History of general anesthesia complication     has woke up during surgery under anesthesia    History of rib fracture 12/03/2015    Chronic     Hyperlipidemia     Hypersomnia     can go multiple days without sleeping    Hypertension     Insomnia     Intolerance of continuous positive airway pressure (CPAP) ventilation 07/20/2017    Iron deficiency     Localized rash     gets frequently in axilla, groin, in any fold, on several topical treatments for this    Lymphedema of both lower extremities 4/27/2021    Magnesium deficiency     Mastoiditis of left side     Mixed conductive and sensorineural hearing loss of both ears 01/10/2017    Per ENT    Mixed type COPD (chronic obstructive pulmonary disease) ([address]     On home O2, multiple inhlaers, nebulizer    Moderate recurrent major depression ([address] 10/02/2016    Morbid obesity with BMI of 45.0-49.9, adult ([address] 06/16/2015    NSTEMI (non-ST elevated myocardial infarction) ([address]     On home oxygen therapy     3 Lpm prn    Open wound of groin 12/19/2018    healed     BARBY on CPAP     Osteoarthritis     Otitis externa of left ear     Pancreatitis chronic     Persistent depressive disorder 11/19/2019    Renal insufficiency     proteinuria    Seizures ([address] 6/27/2019    Severe depression ([address] 09/25/2013    Spinal stenosis of lumbar region without neurogenic claudication 01/06/2016    MRI lumbar 12/30/15 L3-L4: There is broad-based bulging disc which appears protruding left laterally causing flattening of the ventral thecal sac. In addition, there is facet arthropathy with mild hypertrophic changes.  There is MI    High Blood Pressure Mother     Diabetes Mother     High Blood Pressure Father          age 80 from CKD and Lung Fibrosis    Kidney Disease Father     Heart Disease Sister     Heart Attack Sister     Obesity Sister     Diabetes Sister         Review of Systems   Constitutional: Negative for fever, chills, sweats. Respiratory/Cardio: Negative for Chest pain, palpitations, SOB, or cough. Gastrointestinal: Negative for abdominal pain, N/V/D. Neurological: Negative for headache, numbness, or weakness. Integumentary: Negative for rash, itching, laceration, or abrasion. Musculoskeletal: Positive for left index finger pain    All other systems reviewed and negative    Physical Exam:  Constitutional: Patient is oriented to person, place, and time. Patient appears well-developed and well nourished. HENT: Negative otherwise noted  Head: Normocephalic and Atraumatic  Eyes: Conjunctivae and EOM are normal  Neck: Normal range of motion Neck supple. Respiratory/Cardio: Effort normal. No respiratory distress.   Skin: warm and dry, no rash or erythema  Neuro: Sensation grossly intact to light touch diffusely  Skin: dry flaky skin noted to b/l upper extremities  Psychiatric: Behavior is normal. Thought content normal.    Musculoskeletal:   Pt presents in wheelchair  There is mild to moderate swelling to the proximal phalanx of the left index finger, distal neurovascular intact, cap refill is normal, there is some edema to the dorsal left hand, there is no ecchymosis, range of motion severely limited secondary to pain in the left index finger           Nursing note and vitals reviewed        X-rays taken in clinic today and preliminarily reviewed by me:  Left hand x-rays obtained here in the office:  3 view obtained, no acute fracture, there is some generalized degenerative changes the left hand       Orders Placed This Encounter   Procedures    XR HAND LEFT (MIN 3 VIEWS)     Standing Status: Future     Number of Occurrences:   1     Standing Expiration Date:   11/30/2022   [address]     Referral Priority:   Routine     Referral Type:   Eval and Treat     Referral Reason:   Specialty Services Required     Requested Specialty:   Occupational Therapy     Number of Visits Requested:   1       Assessment and Plan:  1. Sprain of left index finger, initial encounter          PLAN:  Splint and Ace wrap removed, possibly causing increased swelling in the left hand    Physical therapy left hand and fingers ordered for patient    Patient takes Percocet and pain medication from pain management    Instructed to ice and elevate his left hand to help with swelling and pain    3-week follow-up to assess ROM and pain          Electronically signed by YECENIA Glass on 11/30/21 at 9:42 AM EST        Please note that this chart was generated using voice recognition Dragon dictation software. Although every effort was made to ensure the accuracy of this automated transcription, some errors in transcription may have occurred.

## 2021-12-01 DIAGNOSIS — D50.0 IRON DEFICIENCY ANEMIA DUE TO CHRONIC BLOOD LOSS: ICD-10-CM

## 2021-12-01 DIAGNOSIS — J44.9 MIXED TYPE COPD (CHRONIC OBSTRUCTIVE PULMONARY DISEASE) (HCC): ICD-10-CM

## 2021-12-01 DIAGNOSIS — J44.9 CHRONIC OBSTRUCTIVE PULMONARY DISEASE, UNSPECIFIED COPD TYPE (HCC): ICD-10-CM

## 2021-12-01 RX ORDER — FLUTICASONE PROPIONATE AND SALMETEROL XINAFOATE 230; 21 UG/1; UG/1
AEROSOL, METERED RESPIRATORY (INHALATION)
Qty: 36 G | Refills: 5 | Status: SHIPPED | OUTPATIENT
Start: 2021-12-01

## 2021-12-01 RX ORDER — TIOTROPIUM BROMIDE INHALATION SPRAY 3.12 UG/1
SPRAY, METERED RESPIRATORY (INHALATION)
Qty: 4 G | Refills: 5 | Status: SHIPPED | OUTPATIENT
Start: 2021-12-01

## 2021-12-01 RX ORDER — FERROUS SULFATE 325(65) MG
TABLET ORAL
Qty: 90 TABLET | Refills: 3 | Status: SHIPPED | OUTPATIENT
Start: 2021-12-01

## 2021-12-01 NOTE — TELEPHONE ENCOUNTER
Please Approve or Refuse. Send to Pharmacy per Pt's Request:      Next Visit Date:  3/2/2022   Last Visit Date: 10/21/2021    Hemoglobin A1C (%)   Date Value   10/23/2021 7.7 (H)   07/20/2021 7.6   04/23/2021 8.0             ( goal A1C is < 7)   BP Readings from Last 3 Encounters:   11/24/21 (!) 150/60   11/11/21 (!) 135/58   11/01/21 (!) 108/48          (goal 120/80)  BUN   Date Value Ref Range Status   11/15/2021 59 (H) 6 - 20 mg/dL Final     CREATININE   Date Value Ref Range Status   11/15/2021 1.94 (H) 0.70 - 1.20 mg/dL Final     Potassium   Date Value Ref Range Status   11/15/2021 4.5 3.7 - 5.3 mmol/L Final     Comment:     SPECIMEN SLIGHTLY HEMOLYZED, RESULTS MAY BE ADVERSELY AFFECTED.

## 2021-12-02 ENCOUNTER — PATIENT MESSAGE (OUTPATIENT)
Dept: FAMILY MEDICINE CLINIC | Age: 56
End: 2021-12-02

## 2021-12-02 DIAGNOSIS — M48.061 SPINAL STENOSIS OF LUMBAR REGION WITHOUT NEUROGENIC CLAUDICATION: ICD-10-CM

## 2021-12-02 DIAGNOSIS — M54.42 CHRONIC MIDLINE LOW BACK PAIN WITH LEFT-SIDED SCIATICA: ICD-10-CM

## 2021-12-02 DIAGNOSIS — G89.29 CHRONIC MIDLINE LOW BACK PAIN WITH LEFT-SIDED SCIATICA: ICD-10-CM

## 2021-12-02 DIAGNOSIS — M51.36 DDD (DEGENERATIVE DISC DISEASE), LUMBAR: ICD-10-CM

## 2021-12-02 DIAGNOSIS — M96.1 POSTLAMINECTOMY SYNDROME OF LUMBAR REGION: ICD-10-CM

## 2021-12-02 RX ORDER — OXYCODONE HYDROCHLORIDE 10 MG/1
10 TABLET ORAL EVERY 8 HOURS PRN
Qty: 90 TABLET | Refills: 0 | Status: SHIPPED | OUTPATIENT
Start: 2021-12-02 | End: 2022-01-03 | Stop reason: SDUPTHER

## 2021-12-07 ENCOUNTER — PATIENT MESSAGE (OUTPATIENT)
Dept: FAMILY MEDICINE CLINIC | Age: 56
End: 2021-12-07

## 2021-12-07 DIAGNOSIS — M1A.30X0 CHRONIC GOUT DUE TO RENAL IMPAIRMENT WITHOUT TOPHUS, UNSPECIFIED SITE: ICD-10-CM

## 2021-12-07 DIAGNOSIS — I13.0 BENIGN HYPERTENSIVE HEART AND CKD, STAGE 3 (GFR 30-59), W CHF (HCC): ICD-10-CM

## 2021-12-07 DIAGNOSIS — I50.32 CHRONIC DIASTOLIC CONGESTIVE HEART FAILURE (HCC): Primary | ICD-10-CM

## 2021-12-07 DIAGNOSIS — N18.30 BENIGN HYPERTENSIVE HEART AND CKD, STAGE 3 (GFR 30-59), W CHF (HCC): ICD-10-CM

## 2021-12-08 ENCOUNTER — PATIENT MESSAGE (OUTPATIENT)
Dept: FAMILY MEDICINE CLINIC | Age: 56
End: 2021-12-08

## 2021-12-08 DIAGNOSIS — M1A.30X0 CHRONIC GOUT DUE TO RENAL IMPAIRMENT WITHOUT TOPHUS, UNSPECIFIED SITE: Primary | ICD-10-CM

## 2021-12-08 NOTE — TELEPHONE ENCOUNTER
From: Dom Maldonado.   To: Dr. Reardon Fraction: 12/7/2021 5:10 PM EST  Subject: Pain     Is it possible to get Gout in Both Ankles and Both knees and I am taking the Gout Meds that you put me on but not helping please let me know what I can do

## 2021-12-09 RX ORDER — ALLOPURINOL 300 MG/1
300 TABLET ORAL DAILY
Qty: 90 TABLET | Refills: 1 | Status: SHIPPED | OUTPATIENT
Start: 2021-12-09 | End: 2022-06-06 | Stop reason: SDUPTHER

## 2021-12-13 ENCOUNTER — TELEPHONE (OUTPATIENT)
Dept: PHARMACY | Age: 56
End: 2021-12-13

## 2021-12-13 NOTE — TELEPHONE ENCOUNTER
Patient was unable to make it in to appt for diabetes management today due to pain in multiple joints and unable to physically get into appt. Called patient to provide services virtually. He states the pain is in multiple joints and has been present since 11/24 approximately. Patient states is not getting any better. Encouraged him to touch base with provider if continues. Patient also admits his infections under his arms he has dealt with in past are starting to recur. Also, encouraged him to discuss with provider and not to let go. Patient states no hypoglycemia in recent weeks and no blood sugars below 100. Patient reports most blood sugars in 200's with fasting blood sugars in 215-230 range. Patient has been eating 2 meals and 1 snack per day. Taking insulin  0.52 with breakfast, 0.52 with lunch and 0.45 with dinner. Patient reports highest elevations in blood sugar in middle of night. Patient reports not eating unhealthy foods and trying to eat as little as possible due to difficulty in getting to restroom. Will attempt to manage patient's blood sugars virtually until able to come in to clinic in person. Patient instructed to increase dinner insulin to 0.5 and to call with any hypoglycemia. Patient reports having enough of all diabetic supplies and medications. Yesenia Rizvi [facility],Pharm. D,, BCPS, CACP  12/13/2021  3:06 PM      For Pharmacy [id number] [place] [address] in place:   Yes   Recommendation Provided To: Patient/Caregiver: 1 via Telephone   Intervention Detail: Dose Adjustment: 1, reason: Therapy Optimization   Intervention Accepted By: Patient/Caregiver: 1   Time Spent (min): 30

## 2021-12-13 NOTE — TELEPHONE ENCOUNTER
Needs virtual appointment for hydradenitis and joint pains in multiple joints  OK for a double book with me on Thursday

## 2021-12-15 ASSESSMENT — PATIENT HEALTH QUESTIONNAIRE - PHQ9
SUM OF ALL RESPONSES TO PHQ QUESTIONS 1-9: 0
2. FEELING DOWN, DEPRESSED OR HOPELESS: 0
SUM OF ALL RESPONSES TO PHQ QUESTIONS 1-9: 0
SUM OF ALL RESPONSES TO PHQ9 QUESTIONS 1 & 2: 0
SUM OF ALL RESPONSES TO PHQ QUESTIONS 1-9: 0
1. LITTLE INTEREST OR PLEASURE IN DOING THINGS: 0

## 2021-12-15 NOTE — PROGRESS NOTES
Visit Information    Have you changed or started any medications since your last visit including any over-the-counter medicines, vitamins, or herbal medicines? no   Have you stopped taking any of your medications? Is so, why? -  no  Are you having any side effects from any of your medications? - no    Have you seen any other physician or provider since your last visit? yes -    Have you had any other diagnostic tests since your last visit? yes -    Have you been seen in the emergency room and/or had an admission in a hospital since we last saw you?  yes -    Have you had your routine dental cleaning in the past 6 months?  no     Do you have an active MyChart account? If no, what is the barrier?   Yes    Patient Care Team:  Debra Calvillo MD as PCP - General (Family Medicine)  Debra Calvillo MD as PCP - [facility]  Aura Hamilton MD as Consulting Physician (Endocrinology)  Celeste Oseguera DO as Consulting Physician (Cardiology)  Toño Ying DPM as Surgeon (Podiatry)  Francesca Francois MD as Consulting Physician (Ophthalmology)  Sophia Pulido MD as Consulting Physician (Nephrology)  Rachid Menezes MD as Consulting Physician (Pulmonology)  Peña Maldonado MD as Surgeon (Vascular Surgery)  Elsie Ganser, MD as Consulting Physician (Gastroenterology)  Maria Teresa Yu [facility] as Pharmacist (Pharmacist)  Ashlyn Mosquera MD as Consulting Physician (Pulmonology)  Ban Muñoz MD as Consulting Physician (Urology)  Sylvia Campbell MD as Surgeon (Ophthalmology)  Jacek Levi MD as Consulting Physician (Neurology)  Rachid Menezes DO as Consulting Physician (Otolaryngology)  Elsie Ganser, MD as Consulting Physician (Gastroenterology)  NICOLE Kam - CNP as Nurse Practitioner (Otolaryngology)  Sophia Pulido MD as Consulting Physician (Nephrology)    Medical History Review  Past Medical, Family, and Social History reviewed and does contribute to the patient presenting condition    Health Maintenance   Topic Date Due    Pneumococcal 0-64 years Vaccine (2 of 4 - PCV13) 05/23/2014    Shingles Vaccine (1 of 2) Never done    Diabetic retinal exam  10/24/2019    Diabetic foot exam  05/02/2020    COVID-19 Vaccine (3 - Booster for Moderna series) 11/27/2021    A1C test (Diabetic or Prediabetic)  01/23/2022    Annual Wellness Visit (AWV)  02/24/2022    Lipid screen  09/12/2022    Potassium monitoring  11/15/2022    Creatinine monitoring  11/15/2022    Colon cancer screen colonoscopy  04/12/2024    DTaP/Tdap/Td vaccine (3 - Td or Tdap) 09/12/2028    Hepatitis B vaccine  Completed    Flu vaccine  Completed    Hepatitis C screen  Completed    HIV screen  Completed    Hepatitis A vaccine  Aged Out    Hib vaccine  Aged Out    Meningococcal (ACWY) vaccine  Aged Out

## 2021-12-16 ENCOUNTER — TELEMEDICINE (OUTPATIENT)
Dept: FAMILY MEDICINE CLINIC | Age: 56
End: 2021-12-16
Payer: COMMERCIAL

## 2021-12-16 DIAGNOSIS — N18.30 BENIGN HYPERTENSIVE HEART AND CKD, STAGE 3 (GFR 30-59), W CHF (HCC): ICD-10-CM

## 2021-12-16 DIAGNOSIS — M1A.39X0 CHRONIC GOUT DUE TO RENAL IMPAIRMENT OF MULTIPLE SITES WITHOUT TOPHUS: Primary | ICD-10-CM

## 2021-12-16 DIAGNOSIS — M54.9 CHRONIC BACK PAIN GREATER THAN 3 MONTHS DURATION: ICD-10-CM

## 2021-12-16 DIAGNOSIS — E11.42 TYPE 2 DIABETES MELLITUS WITH DIABETIC POLYNEUROPATHY, WITH LONG-TERM CURRENT USE OF INSULIN (HCC): ICD-10-CM

## 2021-12-16 DIAGNOSIS — I50.32 CHRONIC DIASTOLIC CONGESTIVE HEART FAILURE (HCC): ICD-10-CM

## 2021-12-16 DIAGNOSIS — M48.061 SPINAL STENOSIS OF LUMBAR REGION WITHOUT NEUROGENIC CLAUDICATION: ICD-10-CM

## 2021-12-16 DIAGNOSIS — I13.0 BENIGN HYPERTENSIVE HEART AND CKD, STAGE 3 (GFR 30-59), W CHF (HCC): ICD-10-CM

## 2021-12-16 DIAGNOSIS — G89.29 CHRONIC BACK PAIN GREATER THAN 3 MONTHS DURATION: ICD-10-CM

## 2021-12-16 DIAGNOSIS — Z79.4 TYPE 2 DIABETES MELLITUS WITH DIABETIC POLYNEUROPATHY, WITH LONG-TERM CURRENT USE OF INSULIN (HCC): ICD-10-CM

## 2021-12-16 DIAGNOSIS — Z51.81 MEDICATION MONITORING ENCOUNTER: ICD-10-CM

## 2021-12-16 PROCEDURE — 2022F DILAT RTA XM EVC RTNOPTHY: CPT | Performed by: FAMILY MEDICINE

## 2021-12-16 PROCEDURE — 3051F HG A1C>EQUAL 7.0%<8.0%: CPT | Performed by: FAMILY MEDICINE

## 2021-12-16 PROCEDURE — G8427 DOCREV CUR MEDS BY ELIG CLIN: HCPCS | Performed by: FAMILY MEDICINE

## 2021-12-16 PROCEDURE — 3017F COLORECTAL CA SCREEN DOC REV: CPT | Performed by: FAMILY MEDICINE

## 2021-12-16 PROCEDURE — 99214 OFFICE O/P EST MOD 30 MIN: CPT | Performed by: FAMILY MEDICINE

## 2021-12-16 RX ORDER — BLOOD PRESSURE TEST KIT
KIT MISCELLANEOUS
Qty: 1 KIT | Refills: 0 | Status: SHIPPED | OUTPATIENT
Start: 2021-12-16 | End: 2022-05-05

## 2021-12-16 RX ORDER — SPIRONOLACTONE 50 MG/1
TABLET, FILM COATED ORAL
COMMUNITY
Start: 2021-12-14 | End: 2021-12-16 | Stop reason: HOSPADM

## 2021-12-16 ASSESSMENT — ENCOUNTER SYMPTOMS
ABDOMINAL DISTENTION: 0
CHEST TIGHTNESS: 0
BACK PAIN: 1
CONSTIPATION: 0
WHEEZING: 0
ABDOMINAL PAIN: 1
SHORTNESS OF BREATH: 1
COUGH: 1
DIARRHEA: 0
VOMITING: 0
NAUSEA: 0

## 2021-12-16 NOTE — PROGRESS NOTES
2021    TELEHEALTH EVALUATION -- Audio/Visual (During PNQWL-86 public health emergency)      Sylvester Alatorre (:  1965) is a 63 y.o. male,Established patient, here for evaluation of the following chief complaint(s): Edema (Ankles), Immunizations (GETTING BOOSTER SHOT FOR COVID-19/SHINGLES SHOT), and Joint Pain (Knees and ankles)        ASSESSMENT/PLAN:    1. Chronic gout due to renal impairment of multiple sites without tophus  Improving with increasing dosage of allopurinol  To stop drinking pop, continue allopurinol 300 mg, avoid turkey and high purine diet, highly suspect gout flareup in the knees and ankles  We need to rule out inflammatory arthritis  -     Uric Acid; Future  -     Sedimentation Rate; Future  -     C-Reactive Protein; Future  2. Benign hypertensive heart and CKD, stage 3 (GFR 30-59), w CHF ([facility] [address]  Slightly worsening kidney function, GFR was 36 on 11/15/2021, GFR was 43 2021  Unsure if the blood pressure is  well controlled or not, he says he does not have a blood pressure machine  To continue current medications metoprolol 50 mg twice a day, lisinopril 5 mg daily, Bumex 1 mg twice a day  He thinks he is still taking spironolactone 25 mg, he was asked to review his bottles of medications, this was stopped at the last admission, however is still coming from the Pharmacy in his medication list  Recheck labs  Discussed low salt diet and BP, WT and pulse monitoring.   -     Blood Pressure KIT; Disp-1 kit, R-0, PrintDiagnosis: HTN. Needs to check blood pressure 1-2 times a day until stable, then once a day. Goal blood pressure less than 135/85, and above 110/60.  -     Brain Natriuretic Peptide; Future  -     Comprehensive Metabolic Panel; Future  -     CBC; Future  -     Magnesium; Future  -     Phosphorus; Future  3.  Chronic diastolic congestive heart failure (HCC)  Worsening slowly  Lab Results   Component Value Date    LVEF 46 2021    LVEFMODE Echo 2017 Ejection fraction 50- 55% on 3/25/2021  Ejection fraction 55 to 60% on 8/29/2017  To continue current medications metoprolol 50 mg twice a day, lisinopril 5 mg daily, Bumex 1 mg twice a day  He would benefit from self-monitoring his blood pressure  -     Blood Pressure KIT; Disp-1 kit, R-0, PrintDiagnosis: HTN. Needs to check blood pressure 1-2 times a day until stable, then once a day. Goal blood pressure less than 135/85, and above 110/60.  -     Brain Natriuretic Peptide; Future  -     Comprehensive Metabolic Panel; Future  -     CBC; Future  4. Type 2 diabetes mellitus with diabetic polyneuropathy, with long-term current use of insulin (HCC)  Slightly worsening  Strongly advised to stop drinking pop  Lab Results   Component Value Date    LABA1C 7.7 (H) 10/23/2021    LABA1C 7.6 07/20/2021    LABA1C 8.0 04/23/2021   He benefits from continuing to have pharmacy monitoring, he is on Humulin R 500, 0.52 mL with breakfast, 0.52 mL at lunch, and 0.45 mL with d fluctuating blood glucose inner  Needs to continue monitoring with pharmacy due to high dosage insulin requirements, fluctuating blood glucose  Has been using Jeevan  Denies any recent hypoglycemia        5. Chronic back pain greater than 3 months duration  Improved with medications, denies side effects  Has postlaminectomy syndrome of lumbar spine and spinal stenosis  Continue stretching, repositioning, oxycodone 10 mg 3 times a day as needed, gabapentin, tizanidine as needed, amitriptyline 50 mg at bedtime    6. Spinal stenosis of lumbar region without neurogenic claudication   Likely worsening due to very likely worsening degenerative changes. 7. Medication monitoring encounter  -     DRUG SCREEN, PAIN; Future    Controlled Substance Monitoring:    Acute and Chronic Pain Monitoring:   RX Monitoring 12/16/2021   Attestation -   Periodic Controlled Substance Monitoring No signs of potential drug abuse or diversion identified. ;Possible medication side effects, risk of tolerance/dependence & alternative treatments discussed. ;Assessed functional status. ;Random urine drug screen sent today. Chronic Pain > 50 MEDD -   Chronic Pain > 80 MEDD -           Miguel received counseling on the following healthy behaviors: nutrition, exercise, medication adherence, tobacco cessation and weight loss    Reviewed prior labs and health maintenance  Discussed use, benefit, and side effects of prescribed medications. Barriers to medication compliance addressed. Patient given educational materials - see patient instructions  All patient questions answered. Patient voiced understanding. The patient's past medical,surgical, social, and family history as well as his current medications and allergies were reviewed as documented in today's encounter. Medications, labs, diagnostic studies, consultations and follow-up as documented in this encounter. Return for KEEP APPT. Data Unavailable    Future Appointments   Date Time Provider William Hernandez   2022  1:30 PM Darren Segovia MD [facility]   2022  8:20 AM Shelia Mckeon MD [facility]O [facility]   3/2/2022  8:30 AM Brayan Hernandez MD fp sc [facility]   3/17/2022  8:00 AM Robyn Fernández MD [facility]   3/22/2022 10:00 AM Rae Fernandez MD Thresea Grim URO [facility]         SUBJECTIVE/OBJECTIVE:  Tika Brothers (:  1965) has requested an audio/video evaluation for the following concern(s):Edema (Ankles), Immunizations (GETTING BOOSTER SHOT FOR COVID-19/SHINGLES SHOT), and Joint Pain (Knees and ankles)    Comes with his Aubrie Juarez, his daughter who writes down the instructions    Miguel complains of multiple joint pains: knees and ankles, they are also swollen, gives him a  hard time walking. He says the joints are not warm.   He says it started about 8 days ago, which is about 1 week after the Thanksgiving  He denies his Legs being more swollen than his usual. cardiology recently. Patient does have chronic kidney disease stage III with anemia, he did recently seen nephrologist who changed the  Bumex dosage. He says he does take the injections for anemia/Retacrin, and iron pills    blood pressure is Elevated most recently. BP Readings from Last 3 Encounters:   11/24/21 (!) 150/60   11/11/21 (!) 135/58   11/01/21 (!) 108/48        Pulse is Normal.    Pulse Readings from Last 3 Encounters:   11/24/21 94   11/11/21 95   11/01/21 87       Weight is increasing  Wt Readings from Last 3 Encounters:   11/30/21 (!) 420 lb (190.5 kg)   11/11/21 (!) 420 lb (190.5 kg)   11/01/21 (!) 395 lb (179.2 kg)         Patient has known type 2 diabetes mellitus with multiple complications: Chronic kidney disease stage III, peripheral neuropathy, importance, cardiovascular disease. He reports compliance with his Humulin R 500. Previously he was on Januvia and Metformin, which we discontinued due to worsening kidney function  He follows with pharmacy inpatient to help control his diabetes. Patient reports he is blood Glucose readings are mostly around 200 lately. He denies any low blood glucose, below 80s. He admits he has been drinking pop again, I advised him against pop due to gout flareup and likely worsening diabetes    A1c worsening but well controlled  Lab Results   Component Value Date    LABA1C 7.7 (H) 10/23/2021    LABA1C 7.6 07/20/2021    LABA1C 8.0 04/23/2021     Negative depression screening  PHQ-2 Over the past 2 weeks, how often have you been bothered by any of the following problems? Little interest or pleasure in doing things: Not at all  Feeling down, depressed, or hopeless: Not at all  PHQ-2 Score: 0  PHQ-9 Over the past 2 weeks, how often have you been bothered by any of the following problems?   PHQ-9 Total Score: 0    PHQ Scores 12/15/2021 7/20/2021 4/23/2021 2/23/2021 1/27/2021 2/20/2020 11/19/2019   PHQ2 Score 0 0 6 0 0 3 2   PHQ9 Score 0 0 13 0 0 17 2 Patient has chronic low back pain, for many years, midline, radiating to the sides into the left leg, when having flareups prevents him from walking. Has history of 4 back surgeries, spinal stenosis. Intensity of pain 5 out of 10 with pain medications. Denies side effects from pain medications  Pain is worse when standing too long, or sitting for too long or with certain activities like bending which he has been avoiding. Has difficulty getting dressed on the lower part of the body, or using the wraps on the legs and his family has been helping him. He does have walker, cane, shower bench at home. Review of Systems   Constitutional: Positive for fatigue and unexpected weight change. Negative for activity change, appetite change, chills, diaphoresis and fever. HENT: Positive for hearing loss (hearing aids). Eyes: Positive for visual disturbance (stable, chronic). Respiratory: Positive for cough and shortness of breath. Negative for chest tightness and wheezing. On CPAP, but  unable to use  On continuous oxygen at home, at 3 L/min   Cardiovascular: Positive for leg swelling (improved). Negative for chest pain and palpitations. Gastrointestinal: Positive for abdominal pain (stomach, on and off). Negative for abdominal distention, constipation, diarrhea, nausea and vomiting. He is on pantoprazole for stomach pain. Endocrine: Negative for cold intolerance, heat intolerance, polydipsia, polyphagia and polyuria. Musculoskeletal: Positive for arthralgias, back pain, gait problem and joint swelling. Has walker and cane at home, uses wheelchair when going to the appointments, missed recent appointment with the pharmacy because he could not get out of the house due to flareup of gout in his ankles and knees   Allergic/Immunologic: Positive for immunocompromised state (due to diabetes). Neurological: Positive for numbness (feet, numbness and burning in his feet since 2000). Hematological: Bruises/bleeds easily. Prior to Visit Medications    Medication Sig Taking? Authorizing Provider   MAGNESIUM-OXIDE 400 (241.3 Mg) MG TABS tablet  Yes Historical Provider, MD   allopurinol (ZYLOPRIM) 300 MG tablet Take 1 tablet by mouth daily Dose increased 12/9/2021 . Stop if any rash develops Yes Jessica Meyer MD   oxyCODONE HCl (OXY-IR) 10 MG immediate release tablet Take 1 tablet by mouth every 8 hours as needed for Pain for up to 30 days. Yes Jessica Meyer MD   FEROSUL 325 (65 Fe) MG tablet TAKE ONE TABLET BY MOUTH DAILY Yes Jessica Meyer MD   SPIRIVA RESPIMAT 2.5 MCG/ACT AERS inhaler INHALE TWO PUFFS BY MOUTH DAILY Yes Jessica Meyer MD   ADVAIR -21 MCG/ACT inhaler INHALE [place] Lorenzana Yes Jessica Meyer MD   metoprolol tartrate (LOPRESSOR) 50 MG tablet Take 1 tablet by mouth 2 times daily Yes Jessica Meyer MD   MUCUS RELIEF 600 MG extended release tablet  Yes Historical Provider, MD   tiZANidine (ZANAFLEX) 4 MG tablet  Yes Historical Provider, MD   lisinopril (PRINIVIL;ZESTRIL) 5 MG tablet Take 5 mg by mouth daily Yes Historical Provider, MD   butalbital-acetaminophen-caffeine (FIORICET, ESGIC) -40 MG per tablet Take 1 tablet by mouth every 8 hours as needed for Headaches Yes Megan Vizcaino MD   amitriptyline (ELAVIL) 50 MG tablet Take 1 po qhs Yes Megan Vizcaino MD   blood glucose monitor strips Test 3 times a day & as needed for symptoms of irregular blood glucose. Dispense sufficient amount for indicated testing frequency plus additional to accommodate PRN testing needs.  One touch Ultra blue Yes Jessica Meyer MD   bumetanide (BUMEX) 1 MG tablet Take 1 tablet by mouth 2 times daily Dose decreased 10/23/2021 Yes Jessica Meyer MD   neomycin-polymyxin-hydrocortisone (CORTISPORIN) 3.5-85810-0 otic suspension Place 3 drops into the right ear 4 times daily For 10 days Yes Jessica Meyer MD ammonium lactate (LAC-HYDRIN) 12 % lotion Apply topically daily. Yes Cristal Diamond MD   venlafaxine (EFFEXOR XR) 75 MG extended release capsule TAKE ONE CAPSULE BY MOUTH DAILY WITH FOOD Yes Cristal Diamond MD   ROCKLATAN 0.02-0.005 % SOLN  Yes Historical Provider, MD   nystatin (MYCOSTATIN) 987271 UNIT/ML suspension Take 5 mLs by mouth 4 times daily Swish and swallow Yes Cristal Diamond MD   epoetin leslie-epbx (RETACRIT) 3000 UNIT/ML SOLN injection Inject 1 mL into the skin three times a week Yes Hernan Holman MD   nicotine (NICODERM CQ) 21 MG/24HR Place 1 patch onto the skin daily Yes Hernan Holman MD   Insulin Syringe-Needle U-100 31G X 5/16\" 1 ML MISC Use to subcutaneously inject insulin three times daily Yes Cristal Diamond MD   vitamin D3 (CHOLECALCIFEROL) 25 MCG (1000 UT) TABS tablet Take 1 tablet by mouth daily Yes Cristal Diamond MD   docusate sodium (COLACE) 100 MG capsule Take 1 capsule by mouth 2 times daily For constipation Yes Cristal Diamond MD   latanoprost (XALATAN) 0.005 % ophthalmic solution 1 drop nightly Yes Historical Provider, MD   clopidogrel (PLAVIX) 75 MG tablet Take 1 tablet by mouth daily Yes Cristal Diamond MD   nitroGLYCERIN (NITROSTAT) 0.4 MG SL tablet DISSOLVE 1 TAB UNDER TONGUE FOR CHEST PAIN - IF PAIN REMAINS AFTER 5 MIN, CALL 911 AND REPEAT DOSE. MAX 3 TABS IN 15 MINUTES Yes Cristal Diamond MD   pantoprazole (PROTONIX) 40 MG tablet Take 1 tablet by mouth every morning (before breakfast) Yes Cristal Diamond MD   insulin regular human (HUMULIN R) 500 UNIT/ML concentrated injection vial Patient using 0.52ml with breakfast, 0.52ml with lunch and 0.45ml with dinner. Patient taking differently: Patient using 0.52ml with breakfast, 0.52ml with lunch and 0.40 ml with dinner.  Yes Cristal Diamond MD   rosuvastatin (CRESTOR) 10 MG tablet Take 1 tablet by mouth nightly Stop pravastatin Yes Cristal Diamond MD   Continuous Blood Gluc Sensor (FREESTYLE CRISTINE 2 SENSOR) MISC Patient to apply a new sensor every 14 days. RX to cover voucher patient will bring in. Yes David Whalen MD   Gauze Pads & Dressings 4\"X4\" PADS Twice a day dressing of right leg wound Yes David Whalen MD   Gauze Bandages (ROLLED GAUZE BANDAGE 4\"X2. 5YD) MISC For twice a day dressing Yes David Whalen MD   PROAIR  (90 Base) MCG/ACT inhaler INHALE TWO PUFFS BY MOUTH EVERY 6 HOURS AS NEEDED FOR WHEEZING OR FOR SHORTNESS OF BREATH Yes David Whalen MD   nystatin (MYCOSTATIN) 309197 UNIT/GM powder Apply 2 times daily in the skin folds for several months Yes David Whalen MD   clobetasol (TEMOVATE) 0.05 % ointment Apply topically 2 times daily for psoriasis Yes David Whalen MD   ketoconazole (NIZORAL) 2 % cream Apply twice a day for yeast infection in the skin folds, for 4 weeks Yes David Whalen MD   albuterol (PROVENTIL) (2.5 MG/3ML) 0.083% nebulizer solution Take 3 mLs by nebulization every 6 hours as needed for Wheezing or Shortness of Breath Yes David Whalen MD   Lancets MISC Use to check blood sugar three times daily along with when necessary due to symptoms. Yes Jovanny Gaiens MD   vitamin B-12 (CYANOCOBALAMIN) 500 MCG tablet Take 1 tablet by mouth daily Yes David Whalen MD   Oxygen Tubing MISC by Does not apply route DX COPD. chronic respiratory failure Yes David Whalen MD   Respiratory Therapy Supplies (NEBULIZER/TUBING/MOUTHPIECE) KIT Dx COPD needs nebulizer supplies Yes David Whalen MD   ONE TOUCH ULTRASOFT LANCETS [address] [facility] Patient to test blood sugar up to 4 times daily. Yes Jovanny Gaines MD   Lancet Devices (LANCING DEVICE) MISC Provide patient with lancing device appropriate for his machine/lancing needles. Yes David Whalen MD   Handicap Alexard MISC by Does not apply route Can't walk greater than 200 feet. Expires in 5 years.  Yes David Whalen MD   fluticasone (CUTIVATE) 0.05 % cream Apply topically 2 times daily  Yes Historical Provider, MD   fluticasone (FLONASE) 50 MCG/ACT nasal spray 2 sprays by Nasal route daily  Patient taking differently: 2 sprays by Nasal route daily as needed (sinus symptoms)  Yes Enzo Good MD   Melatonin 10 MG TABS Take 10 mg by mouth nightly as needed (insomnia) Yes Enzo Good MD   aspirin 81 MG EC tablet Take 81 mg by mouth daily. Yes Historical Provider, MD   gabapentin (NEURONTIN) 300 MG capsule TAKE ONE CAPSULE BY MOUTH THREE TIMES PER DAY  Keli Lowe MD       Social History     Tobacco Use    Smoking status: Former Smoker     Packs/day: 0.25     Years: 33.00     Pack years: 8.25     Types: Cigarettes     Start date: 1985     Quit date: 2020     Years since quittin.1    Smokeless tobacco: Former User     Types: Snuff     Quit date: 1995   Vaping Use    Vaping Use: Never used   Substance Use Topics    Alcohol use: No     Alcohol/week: 0.0 standard drinks    Drug use: Not Currently     Types: Marijuana Maurita Handsome)     Comment: Patient reports he quit using THC for 26 days on 2021          PHYSICAL EXAMINATION:    Vital Signs: (As obtained by patient/caregiver or practitioner observation)  -vital signs stable and within normal limits except morbid obesity BMI 56.96 kg/M2  Patient-Reported Vitals 2021   Patient-Reported Weight 420 lbs   Patient-Reported Height 6 ft   Patient-Reported Systolic -   Patient-Reported Diastolic -           Intensity of pain is 5/10      Constitutional: [x] Appears well-developed and well-nourished [x] No apparent distress      [x] Abnormal-morbidly obese    Mental status  [x] Alert and awake  [x] Oriented to person/place/time [x]Able to follow commands      Eyes:  EOM    [x]  Normal  [] Abnormal-  Sclera  [x]  Normal  [] Abnormal -         Discharge [x]  None visible  [] Abnormal -    HENT:   [x] Normocephalic, atraumatic.   [] Abnormal   [x] Mouth/Throat: Mucous membranes are moist.     External Ears [x] Normal  [] Abnormal-     Neck: [x] No visualized mass     Pulmonary/Chest: [x] Respiratory effort normal.  [x] No visualized signs of difficulty breathing or respiratory distress        [x] Abnormal-noticed he is short of breath at rest, without the oxygen per nasal cannula, advised to place his oxygen on       Musculoskeletal:   [] Normal gait with no signs of ataxia         [x] Normal range of motion of neck        [x] Abnormal-patient was sitting during the encounter, did not walk, he did show me swollen ankles bilaterally    Neurological:        [x] No Facial Asymmetry (Cranial nerve 7 motor function) (limited exam to video visit)          [x] No gaze palsy        [] Abnormal-            Skin:        [x] No significant exanthematous lesions or discoloration noted on facial skin         [] Abnormal-            Psychiatric:      [x] No Hallucinations       []Mood is normal      [x]Behavior is normal      [x]Judgment is normal      [x]Thought content is normal       [x] Abnormal-anxious   Other pertinent observable physical exam findings-none  Due to this being a TeleHealth encounter, evaluation of the following organ systems is limited: Vitals/Constitutional/EENT/Resp/CV/GI//MS/Neuro/Skin/Heme-Lymph-Imm. I personally reviewed most recent labs reviewed with the patient and all questions fully answered.    Chronic kidney disease stage III worsening  Anemia of chronic diseases leukocytosis  Hyperglycemia  Otherwise labs within normal limits        Lab Results   Component Value Date    WBC 18.0 (H) 11/15/2021    HGB 9.7 (L) 11/15/2021    HCT 29.8 (L) 11/15/2021    MCV 84.4 11/15/2021     11/15/2021       Lab Results   Component Value Date     11/15/2021    K 4.5 11/15/2021    CL 93 11/15/2021    CO2 27 11/15/2021    BUN 59 11/15/2021    CREATININE 1.94 11/15/2021    GLUCOSE 219 11/15/2021    CALCIUM 9.5 11/15/2021        Lab Results   Component Value Date    ALT 13 10/23/2021    AST 6 10/23/2021    ALKPHOS 80 10/23/2021    BILITOT 0.22 (L) 10/23/2021       Lab Results   Component Value Date    TSH 2.26 01/20/2021     Lab Results   Component Value Date    LABA1C 7.7 (H) 10/23/2021    LABA1C 7.6 07/20/2021    LABA1C 8.0 04/23/2021         Orders Placed This Encounter   Medications    Blood Pressure KIT     Sig: Diagnosis: HTN. Needs to check blood pressure 1-2 times a day until stable, then once a day. Goal blood pressure less than 135/85, and above 110/60. Dispense:  1 kit     Refill:  0       Orders Placed This Encounter   Procedures    Brain Natriuretic Peptide     Standing Status:   Future     Standing Expiration Date:   12/16/2022    Comprehensive Metabolic Panel     Standing Status:   Future     Standing Expiration Date:   2/12/2022    CBC     Standing Status:   Future     Standing Expiration Date:   12/16/2022    Uric Acid     Standing Status:   Future     Standing Expiration Date:   12/16/2022    Sedimentation Rate     Standing Status:   Future     Standing Expiration Date:   12/16/2022    C-Reactive Protein     Standing Status:   Future     Standing Expiration Date:   2/12/2022    Magnesium     Standing Status:   Future     Standing Expiration Date:   12/16/2022    Phosphorus     Standing Status:   Future     Standing Expiration Date:   12/16/2022    DRUG SCREEN, PAIN     Takes gabapentin and oxycodone     Standing Status:   Future     Standing Expiration Date:   2/16/2022       Medications Discontinued During This Encounter   Medication Reason    magnesium oxide (MAG-OX) 400 (240 Mg) MG tablet DUPLICATE    neomycin-polymyxin-hydrocortisone (CORTISPORIN) 3.5-24067-7 otic suspension Therapy completed    spironolactone (ALDACTONE) 50 MG tablet Stop Taking at Discharge              [address] [place] [address], was evaluated through a synchronous (real-time) audio-video encounter. The patient (or guardian if applicable) is aware that this is a billable service.    Verbal consent to proceed has been obtained within the past 12 months. The visit was conducted pursuant to the emergency declaration under the [address] [place] [address] and the [employer] Act. Patient identification was verified    Patient was alone   The patient was located in a state where the provider was credentialed to provide care. On this date 12/16/2021 I have spent 35 minutes reviewing previous notes, test results and face to face with the patient discussing the diagnosis and importance of compliance with the treatment plan as well as documenting on the day of the visit. --Anaya Hendrickson MD on 12/17/2021 at 7:36 AM    An electronic signature was used to authenticate this note.

## 2021-12-17 ENCOUNTER — TELEPHONE (OUTPATIENT)
Dept: FAMILY MEDICINE CLINIC | Age: 56
End: 2021-12-17

## 2021-12-17 NOTE — TELEPHONE ENCOUNTER
Note completed, please fax order for blood pressure machine  Please note where is being faxed & send patient a [employer] message    Then, please inform the patient where we faxed it, give patient phone number and address of the medical equipment pharmacy to check on that in about 1 week. Please document that the patient verbalized understanding and wrote down the instructions.

## 2021-12-26 DIAGNOSIS — J44.9 CHRONIC OBSTRUCTIVE PULMONARY DISEASE, UNSPECIFIED COPD TYPE (HCC): Primary | ICD-10-CM

## 2021-12-26 DIAGNOSIS — J44.1 COPD EXACERBATION (HCC): ICD-10-CM

## 2021-12-27 RX ORDER — ALBUTEROL SULFATE 2.5 MG/3ML
SOLUTION RESPIRATORY (INHALATION)
Qty: 360 ML | Refills: 0 | Status: SHIPPED | OUTPATIENT
Start: 2021-12-27 | End: 2022-04-12 | Stop reason: SDUPTHER

## 2022-01-03 ENCOUNTER — PATIENT MESSAGE (OUTPATIENT)
Dept: FAMILY MEDICINE CLINIC | Age: 57
End: 2022-01-03

## 2022-01-03 DIAGNOSIS — M51.36 DDD (DEGENERATIVE DISC DISEASE), LUMBAR: ICD-10-CM

## 2022-01-03 DIAGNOSIS — M48.061 SPINAL STENOSIS OF LUMBAR REGION WITHOUT NEUROGENIC CLAUDICATION: ICD-10-CM

## 2022-01-03 DIAGNOSIS — M96.1 POSTLAMINECTOMY SYNDROME OF LUMBAR REGION: ICD-10-CM

## 2022-01-03 DIAGNOSIS — G89.29 CHRONIC MIDLINE LOW BACK PAIN WITH LEFT-SIDED SCIATICA: Primary | ICD-10-CM

## 2022-01-03 DIAGNOSIS — M54.42 CHRONIC MIDLINE LOW BACK PAIN WITH LEFT-SIDED SCIATICA: Primary | ICD-10-CM

## 2022-01-03 RX ORDER — OXYCODONE HYDROCHLORIDE 10 MG/1
10 TABLET ORAL EVERY 8 HOURS PRN
Qty: 90 TABLET | Refills: 0 | Status: SHIPPED | OUTPATIENT
Start: 2022-01-03 | End: 2022-02-02 | Stop reason: SDUPTHER

## 2022-01-03 NOTE — TELEPHONE ENCOUNTER
From: Garo Wiley.   To: Dr. Sadler Brought: 1/3/2022 1:43 AM EST  Subject: Meds     Can you please refill my pain meds for me oxycodone 10 Mg one every 8 hours I will be out of them today thank you so much

## 2022-01-19 ENCOUNTER — E-VISIT (OUTPATIENT)
Dept: FAMILY MEDICINE CLINIC | Age: 57
End: 2022-01-19
Payer: COMMERCIAL

## 2022-01-19 ENCOUNTER — HOSPITAL ENCOUNTER (OUTPATIENT)
Age: 57
Setting detail: SPECIMEN
Discharge: HOME | End: 2022-01-19

## 2022-01-19 DIAGNOSIS — Z20.822 EXPOSURE TO 2019 NOVEL CORONAVIRUS: ICD-10-CM

## 2022-01-19 DIAGNOSIS — B34.9 ACUTE VIRAL SYNDROME: Primary | ICD-10-CM

## 2022-01-19 DIAGNOSIS — J44.9 CHRONIC OBSTRUCTIVE PULMONARY DISEASE, UNSPECIFIED COPD TYPE (HCC): ICD-10-CM

## 2022-01-19 DIAGNOSIS — Z20.822 SUSPECTED COVID-19 VIRUS INFECTION: ICD-10-CM

## 2022-01-19 PROCEDURE — 99422 OL DIG E/M SVC 11-20 MIN: CPT | Performed by: FAMILY MEDICINE

## 2022-01-19 RX ORDER — BENZONATATE 100 MG/1
100 CAPSULE ORAL 3 TIMES DAILY PRN
Qty: 21 CAPSULE | Refills: 0 | Status: SHIPPED | OUTPATIENT
Start: 2022-01-19 | End: 2022-01-26

## 2022-01-19 ASSESSMENT — LIFESTYLE VARIABLES
SMOKING_STATUS: YES
SMOKING_YEARS: 34

## 2022-01-19 NOTE — PROGRESS NOTES
HPI: per patient's questionnaire & Mychart message    Conversation: Covid  (Oldest Message First)    Spencer Suarez.  to Me          1/19/22 10:13 AM  My Daughter Marisol tested positive for Covid any way I can get an order to have a test done I started not feeling good yesterday with cough and sore throat       EXAM: not applicable    Diagnoses and all orders for this visit:    1. Acute viral syndrome  Ongoing, started yesterday per his prior message    - COVID-19; Future  - benzonatate (TESSALON PERLES) 100 MG capsule; Take 1 capsule by mouth 3 times daily as needed for Cough  Dispense: 21 capsule; Refill: 0  - nystatin (MYCOSTATIN) 215124 UNIT/ML suspension; Take 5 mLs by mouth 4 times daily Swish and swallow  Dispense: 240 mL; Refill: 0  He is a high risk patient due to ongoing COPD, diabetes, will avoid steroids at this time,  He has received 3 COVID-19 vaccines  He is on oxygen  He will be advised to completely quit smoking, and start the nebulizer treatments    Lab Results   Component Value Date    LABA1C 7.7 (H) 10/23/2021    LABA1C 7.6 07/20/2021    LABA1C 8.0 04/23/2021       2. Suspected COVID-19 virus infection  - COVID-19; Future  We need to rule out COVID-19 we will advise him to call  Urgent Care at [facility] [address], phone #[phone] to schedule nurse visit. 3. Exposure to 2019 novel coronavirus    - COVID-19; Future    4.  Chronic obstructive pulmonary disease, unspecified COPD type ([facility] [address]    He will be advised to completely quit smoking, and start the nebulizer treatments every 4-6 hours  Continue oxygen  Absolutely avoid smoking for 2 weeks starting now, if you need nicotine patches please let me know    If you need anything else refilled please let me know  If your oxygen drops below 90% while on your usual oxygen level supplementation, you need to go to the emergency room        Orders Placed This Encounter   Procedures    COVID-19     Standing Status:   Future     Standing Expiration Date:   1/19/2023     Scheduling Instructions:      1) Due to current limited availability of the COVID-19 test, tests will be prioritized based on responses to questions above. Testing may be delayed due to volume. 2) Print and instruct patient to adhere to CDC home isolation program. (Link Above)              3) Set up or refer patient for a monitoring program.              4) Have patient sign up for and leverage MyChart (if not previously done). Order Specific Question:   Is this test for diagnosis or screening? Answer:   Diagnosis of ill patient     Order Specific Question:   Symptomatic for COVID-19 as defined by CDC? Answer:   Yes     Order Specific Question:   Date of Symptom Onset     Answer:   1/18/2022     Order Specific Question:   Hospitalized for COVID-19? Answer:   No     Order Specific Question:   Admitted to ICU for COVID-19? Answer:   No     Order Specific Question:   Employed in healthcare setting? Answer:   No     Order Specific Question:   Resident in a congregate (group) care setting? Answer:   No     Order Specific Question:   Pregnant: Answer:   No     Order Specific Question:   Previously tested for COVID-19? Answer:   Yes         Patient was advised to contact PCP if symptoms worsen or failing to change as expected        11-20 minutes were spent on the digital evaluation and management of this patient.   Electronically signed by Daniel Sanchez MD on 1/19/22 at  3:07 PM

## 2022-01-20 DIAGNOSIS — Z20.822 SUSPECTED COVID-19 VIRUS INFECTION: ICD-10-CM

## 2022-01-20 DIAGNOSIS — Z20.822 EXPOSURE TO 2019 NOVEL CORONAVIRUS: ICD-10-CM

## 2022-01-20 DIAGNOSIS — B34.9 ACUTE VIRAL SYNDROME: ICD-10-CM

## 2022-01-20 LAB
SARS-COV-2: ABNORMAL
SARS-COV-2: DETECTED
SOURCE: ABNORMAL

## 2022-01-20 NOTE — RESULT ENCOUNTER NOTE
Please notify patient: Patient tested positive for COVID-19  He has received all 3 shots  If worsening shortness of breath to go to the emergency room  I can order PAXLOVID if kidney function allows us and if the pharmacist is agreeable which is appeal newly approved for COVID-19 infection within 5 days of date of onset of symptoms  OR SOTRIVIMAB monoclonal antibody infusion, which he already has because he got all 3 vaccines    Please let me know if you would like me to order one of the above to diminish the chances of admission    Again if worsening shortness of breath he should go to the emergency room        Order Specific Question: Date of Symptom Onset          Answer: 1/18/2022      Future Appointments  2/11/2022  8:20 AM    Nayla Galan  [place]  2/24/2022  2:15 PM    [facility]        [facility]  3/2/2022   8:30 AM    Mirta Syed MD     [facility]               [facility]  3/17/2022  8:00 AM    Cordelia Carrasco MD     [facility]        [facility]  3/22/2022  10:00 AM   Tarun Posada MD        [facility]

## 2022-01-28 ENCOUNTER — PATIENT MESSAGE (OUTPATIENT)
Dept: FAMILY MEDICINE CLINIC | Age: 57
End: 2022-01-28

## 2022-01-28 DIAGNOSIS — K21.9 GASTROESOPHAGEAL REFLUX DISEASE WITHOUT ESOPHAGITIS: ICD-10-CM

## 2022-01-28 RX ORDER — PANTOPRAZOLE SODIUM 40 MG/1
40 TABLET, DELAYED RELEASE ORAL
Qty: 90 TABLET | Refills: 1 | Status: SHIPPED | OUTPATIENT
Start: 2022-01-28 | End: 2022-08-01 | Stop reason: SDUPTHER

## 2022-01-28 NOTE — TELEPHONE ENCOUNTER
From: Venkatesh Piedra.   To: Dr. Benavides Deep: 1/28/2022 1:28 PM EST  Subject: Meds     Can you please refill my pantoprazole sod Dr 40 mg TaB

## 2022-02-02 ENCOUNTER — PATIENT MESSAGE (OUTPATIENT)
Dept: FAMILY MEDICINE CLINIC | Age: 57
End: 2022-02-02

## 2022-02-02 DIAGNOSIS — M48.061 SPINAL STENOSIS OF LUMBAR REGION WITHOUT NEUROGENIC CLAUDICATION: Primary | ICD-10-CM

## 2022-02-02 DIAGNOSIS — M51.36 DDD (DEGENERATIVE DISC DISEASE), LUMBAR: ICD-10-CM

## 2022-02-02 DIAGNOSIS — M96.1 POSTLAMINECTOMY SYNDROME OF LUMBAR REGION: ICD-10-CM

## 2022-02-02 DIAGNOSIS — M54.42 CHRONIC MIDLINE LOW BACK PAIN WITH LEFT-SIDED SCIATICA: ICD-10-CM

## 2022-02-02 DIAGNOSIS — G89.29 CHRONIC MIDLINE LOW BACK PAIN WITH LEFT-SIDED SCIATICA: ICD-10-CM

## 2022-02-02 RX ORDER — OXYCODONE HYDROCHLORIDE 10 MG/1
10 TABLET ORAL EVERY 8 HOURS PRN
Qty: 90 TABLET | Refills: 0 | Status: SHIPPED | OUTPATIENT
Start: 2022-02-02 | End: 2022-03-02 | Stop reason: SDUPTHER

## 2022-02-10 ENCOUNTER — HOSPITAL ENCOUNTER (OUTPATIENT)
Dept: PHARMACY | Age: 57
Setting detail: THERAPIES SERIES
Discharge: HOME | End: 2022-02-10
Payer: COMMERCIAL

## 2022-02-10 ENCOUNTER — PATIENT MESSAGE (OUTPATIENT)
Dept: FAMILY MEDICINE CLINIC | Age: 57
End: 2022-02-10

## 2022-02-10 ENCOUNTER — HOSPITAL ENCOUNTER (OUTPATIENT)
Age: 57
Discharge: HOME | End: 2022-02-10
Payer: COMMERCIAL

## 2022-02-10 ENCOUNTER — HOSPITAL ENCOUNTER (OUTPATIENT)
Dept: OTHER | Age: 57
Discharge: HOME | End: 2022-02-10
Payer: COMMERCIAL

## 2022-02-10 VITALS
WEIGHT: 315 LBS | DIASTOLIC BLOOD PRESSURE: 88 MMHG | HEART RATE: 76 BPM | OXYGEN SATURATION: 96 % | SYSTOLIC BLOOD PRESSURE: 118 MMHG | RESPIRATION RATE: 24 BRPM | BODY MASS INDEX: 42.66 KG/M2 | HEIGHT: 72 IN

## 2022-02-10 DIAGNOSIS — M1A.39X0 CHRONIC GOUT DUE TO RENAL IMPAIRMENT OF MULTIPLE SITES WITHOUT TOPHUS: ICD-10-CM

## 2022-02-10 DIAGNOSIS — I50.32 CHRONIC DIASTOLIC CONGESTIVE HEART FAILURE (HCC): ICD-10-CM

## 2022-02-10 DIAGNOSIS — E83.42 HYPOMAGNESEMIA: Primary | ICD-10-CM

## 2022-02-10 DIAGNOSIS — E87.6 HYPOKALEMIA: ICD-10-CM

## 2022-02-10 DIAGNOSIS — Z51.81 MEDICATION MONITORING ENCOUNTER: ICD-10-CM

## 2022-02-10 DIAGNOSIS — N18.30 BENIGN HYPERTENSIVE HEART AND CKD, STAGE 3 (GFR 30-59), W CHF (HCC): ICD-10-CM

## 2022-02-10 DIAGNOSIS — I13.0 BENIGN HYPERTENSIVE HEART AND CKD, STAGE 3 (GFR 30-59), W CHF (HCC): ICD-10-CM

## 2022-02-10 LAB
ALBUMIN SERPL-MCNC: 4 G/DL (ref 3.5–5.2)
ALBUMIN/GLOBULIN RATIO: ABNORMAL (ref 1–2.5)
ALP BLD-CCNC: 90 U/L (ref 40–129)
ALT SERPL-CCNC: 11 U/L (ref 5–41)
ANION GAP SERPL CALCULATED.3IONS-SCNC: 15 MMOL/L (ref 9–17)
AST SERPL-CCNC: 14 U/L
BILIRUB SERPL-MCNC: 0.33 MG/DL (ref 0.3–1.2)
BNP INTERPRETATION: NORMAL
BUN BLDV-MCNC: 26 MG/DL (ref 6–20)
BUN/CREAT BLD: ABNORMAL (ref 9–20)
C-REACTIVE PROTEIN: 35.2 MG/L (ref 0–5)
CALCIUM SERPL-MCNC: 9.6 MG/DL (ref 8.6–10.4)
CHLORIDE BLD-SCNC: 99 MMOL/L (ref 98–107)
CO2: 26 MMOL/L (ref 20–31)
CREAT SERPL-MCNC: 1.46 MG/DL (ref 0.7–1.2)
GFR AFRICAN AMERICAN: >60 ML/MIN
GFR NON-AFRICAN AMERICAN: 50 ML/MIN
GFR SERPL CREATININE-BSD FRML MDRD: ABNORMAL ML/MIN/{1.73_M2}
GFR SERPL CREATININE-BSD FRML MDRD: ABNORMAL ML/MIN/{1.73_M2}
GLUCOSE BLD-MCNC: 99 MG/DL (ref 70–99)
HCT VFR BLD CALC: 34.3 % (ref 41–53)
HEMOGLOBIN: 10.9 G/DL (ref 13.5–17.5)
MAGNESIUM: 1.7 MG/DL (ref 1.6–2.6)
MCH RBC QN AUTO: 25.4 PG (ref 26–34)
MCHC RBC AUTO-ENTMCNC: 31.7 G/DL (ref 31–37)
MCV RBC AUTO: 80.3 FL (ref 80–100)
NRBC AUTOMATED: ABNORMAL PER 100 WBC
PDW BLD-RTO: 18.3 % (ref 11.5–14.9)
PHOSPHORUS: 2.7 MG/DL (ref 2.5–4.5)
PLATELET # BLD: 218 K/UL (ref 150–450)
PMV BLD AUTO: 7.9 FL (ref 6–12)
POTASSIUM SERPL-SCNC: 3.3 MMOL/L (ref 3.7–5.3)
PRO-BNP: 238 PG/ML
RBC # BLD: 4.27 M/UL (ref 4.5–5.9)
SEDIMENTATION RATE, ERYTHROCYTE: 53 MM (ref 0–20)
SODIUM BLD-SCNC: 140 MMOL/L (ref 135–144)
TOTAL PROTEIN: 7.3 G/DL (ref 6.4–8.3)
URIC ACID: 7.9 MG/DL (ref 3.4–7)
WBC # BLD: 11.1 K/UL (ref 3.5–11)

## 2022-02-10 PROCEDURE — 83735 ASSAY OF MAGNESIUM: CPT

## 2022-02-10 PROCEDURE — 85652 RBC SED RATE AUTOMATED: CPT

## 2022-02-10 PROCEDURE — 80307 DRUG TEST PRSMV CHEM ANLYZR: CPT

## 2022-02-10 PROCEDURE — 84100 ASSAY OF PHOSPHORUS: CPT

## 2022-02-10 PROCEDURE — 80053 COMPREHEN METABOLIC PANEL: CPT

## 2022-02-10 PROCEDURE — 84550 ASSAY OF BLOOD/URIC ACID: CPT

## 2022-02-10 PROCEDURE — 99211 OFF/OP EST MAY X REQ PHY/QHP: CPT

## 2022-02-10 PROCEDURE — 36415 COLL VENOUS BLD VENIPUNCTURE: CPT

## 2022-02-10 PROCEDURE — 83880 ASSAY OF NATRIURETIC PEPTIDE: CPT

## 2022-02-10 PROCEDURE — 85027 COMPLETE CBC AUTOMATED: CPT

## 2022-02-10 PROCEDURE — 86140 C-REACTIVE PROTEIN: CPT

## 2022-02-10 PROCEDURE — 99213 OFFICE O/P EST LOW 20 MIN: CPT

## 2022-02-10 NOTE — PROGRESS NOTES
Diabetic Medication Management   [facility] [address]    [address] [facility]. [place], [address] [facility]  Phone: [phone]  Fax: [phone]    NAME: Megan Blanchard. MEDICAL RECORD NUMBER:  [id number]  AGE: 61 y.o. GENDER: male  : 1965  EPISODE DATE:  2/10/2022       Mr. Erich Little was referred to [address] [place] [address] Medication Management Services by Dr. Fatuma Kamara, Special instructions include: titrate all medications (as defined in clinic's policy and procedure)    Patient seen in office. Goals per referral:   Fasting blood glucose: < 130  Peak postprandial glucose: < 180  A1C: < 7    Other goals:  Blood pressure goal: 130/80  Weight loss goal (~10%): Target weight  410 to be reached by date: 2022 (3-6 months)  Physical Activity goal:    Discussed but nothing formal at this time and no specific goals set  Smoking Cessation   Quit Date: Stopped 20. Cholesterol at target:   Date: Yes LDL 46  HDL 32 Trig 213 (2021)  Annual eye exam:    Date: 2022  Comprehensive annual foot exam:   Date:  2022  Annual urine Albumin and serum creatinine:   Date:  microalbumin <12 mg/L (21), SrCr 1.46 mg/dL (2/10/22) eGFR 52.89 ml/min        Subjective   Mr. Erich Little is a 61 y.o. male here for the Diabetes Service for self-management education, medication review including over the counter medications and herbal products, overall well-being assessment, transition of care and any needed adjustments with updates and recommendations communicated to the referring physician. Patient's name and  verified. Patient Findings:    Patient states patient's sister passed on  (day before his birthday) and really threw him off the rails per patient. Admits to causing him to not follow diet or medications for about a month or more. Admits to not being compliant with insulin - usually taking only one injection of insulin a day.   Was not checking his blood sugar either he states nor using [address] but upon review do have more data from [address] than expected. Asked patient if he has considered talking to a therapist and he states he has no interest.  Patient did go to a therapist in past and admits it was helpful but opening up to others is very difficult for him. Patient does not want to consider at this time. Had issues getting sensors from pharmacy. Pharmacy did not have in stock when requested refill and then ordered the wrong sensors twice per patient resulting in him not having his Freestyle Lianne sensor for two weeks. Patient admits he did not regularly check using glucometer as was just frustrated and gave up. Did check a couple times blood sugars were in 400-500 range and that is when patient would take insulin. Patient states he would check as did not feel well. Also admits to checking when felt he was low also and treated hypoglycemia appropriately at that time. Has scale at home but not using. Was on plant based diet. Discussed importance of weighing himself to monitor heart failure. Asked patient what we can do to help him get back to better blood sugar control or if he is even interested in doing so. Thanked patient for coming to appt today and for being persistent in getign sensors. Patient thinks he would do better by seeing us more frequently as feels he does what he is supposed to when has appt with our service. Explained that prior we had not had frequent appt due to patient's pain/health and inability to come in but that we are happy to see him more frequently to get hiim back on track. Patient admits to some continued chest pain. States similar to what he has had in past. Has been evaluated in the past and not acutely cardiac in nature. Encouraged patient to not let anything go and to inform providers and get evaluated. Patient will see heart failure nurses today. Discussed compliance with other medications.   Patient admits to sleeping longer and skipping his morning medications periodically in last month or two but overall has been pretty compliant with other medications. Patient had covid in January and SOB, HA, runny nose, cough. Patient diagnosed on 1/19/22. Patient feels back to normal for him. Appt in March to see nephrologist.   Patient had SrCr done today which has significantly improved. Patient due for microalbumin but will wait till after he sees nephrologist to see if any other labwork needed. Patient reports when he took his insulin he took the dose of .52 in morning, .52 at lunch, .45 with dinner. Did drop down to .40 if blood sugar was lower. PT called patient to schedule an appt but was right after sister's death. Encouraged to call and schedule as this would help him. Patient feels his skin infection he often has in his armpits are getting worse again  May need antibiotics again he thinks. Encouraged him to communicate this to his PCP. Ear infections gone. Patient did not go to see the dentist but did extract the bone chip in his mouth himself about a month ago. Patient reports no pain/swelling and no issues with infection. Does not plan on following up with dentist.     Patient has follow up with eye doctor in Jan 2022. States he was told is pressures were much better. Compliant with eye drops. Cancelled Moosa due to covid. Has another appt scheduled in 2 weeks. Gabapentin - feels last dose increase really has helped neuropathy. Will follow with neurologist tomorrow. Patient has enough insulin and glucose testing supplies. Patient continues taking allopurinol. Uric acid checked today and decreased significantly (11.5 to 7.9) from last Aug 2021 but still somewhat elevated. Patient due for A1c but already had blood work drawn today.   Will try to check at next appt if not done at appt with Dr. Meghan Manriquez      [x]  Missed doses   []  Emergency Room Visit    []  Medication changes  []  Hospitalization   [x] Diet changes   [x]  Acute illness   []  Activity changes      Symptoms of hypoglycemia    []  None    []  Shakiness    [x]  Lightheadedness or dizziness   []  Confusion      []  Sweating   [x]  Other wakes up from sleeping       Symptoms of hyperglycemia -.    []  None   [x]  Frequent urination    []  Increased thirst   []  Other    Medication adverse reactions (none due to diabetic medicaitons)   [x]  None   []  Diarrhea / Nausea / Vomiting / Constipation / flatulence   []  Hypertension   []  Peripheral edema     []  Signs of infection   []  Headache   []  Vision changes   []  Increased cholesterol    []  Weight gain   []  Change in renal function   []  Increased potassium  []  Other          If recent hospital admission / ED visit, was this related to Diabetes No:Chest pain      Objective     PMHx:    Past Medical History:   Diagnosis Date    Acute kidney injury superimposed on CKD ([address] 4/10/2013    Acute on chronic congestive heart failure [facility]     Acute on chronic kidney failure ([address] 07/20/2017    Acute on chronic respiratory failure ([address] 10/02/2018    Acute on chronic respiratory failure with hypoxia ([address] 9/30/2021    Adhesive capsulitis of left shoulder 03/25/2017    Anemia, normocytic normochromic 9/12/2021    Anxiety 10/02/2016    smokes marijuana for this    Arthropathy, unspecified, other specified sites 06/13/2013    Asthma     B12 deficiency     Bilateral lower leg cellulitis 02/17/2016    Blood in stool     CAD (coronary artery disease)     Cardiovascular stress test abnormal 2018    Cellulitis of both lower extremities 05/25/2017    Cellulitis of leg, left 07/20/2017    CHF (congestive heart failure), NYHA class III (HCC) 08/14/2013    Chronic back pain     Chronic bronchitis (HCC)     Chronic headaches     was referred to neuro, testing scheduled    Chronic infective otitis externa 4/28/2017    Chronic malignant otitis externa of both ears 7/24/2020    Chronic respiratory failure ([address]     was on vent    Chronic ulcer of left leg, with fat layer exposed ([address] 02/22/2019    healed    Class 2 severe obesity due to excess calories with serious comorbidity and body mass index (BMI) of 35.0 to 35.9 in adult (HCC)     (BMI 35.0-39.9 without comorbidity)    COPD exacerbation ([address] 11/02/2016    Diabetic neuropathy ([address] 08/14/2013    Displacement of lumbar intervertebral disc without myelopathy 06/13/2013    Ear infection     RIGHT    Elevated troponin     Essential hypertension     Facial cellulitis 2012    Fall 03/25/2017    GERD (gastroesophageal reflux disease)     Head injury     Hearing loss in right ear     pencil pierced ear as a child    Hepatic steatosis 12/03/2015    History of general anesthesia complication     has woke up during surgery under anesthesia    History of rib fracture 12/03/2015    Chronic     Hyperlipidemia     Hypersomnia     can go multiple days without sleeping    Hypertension     Insomnia     Intolerance of continuous positive airway pressure (CPAP) ventilation 07/20/2017    Iron deficiency     Localized rash     gets frequently in axilla, groin, in any fold, on several topical treatments for this    Lymphedema of both lower extremities 4/27/2021    Magnesium deficiency     Mastoiditis of left side     Mixed conductive and sensorineural hearing loss of both ears 01/10/2017    Per ENT    Mixed type COPD (chronic obstructive pulmonary disease) ([address]     On home O2, multiple inhlaers, nebulizer    Moderate recurrent major depression ([address] 10/02/2016    Morbid obesity with BMI of 45.0-49.9, adult ([address] 06/16/2015    NSTEMI (non-ST elevated myocardial infarction) ([address]     On home oxygen therapy     3 Lpm prn    Open wound of groin 12/19/2018    healed     BARBY on CPAP     Osteoarthritis     Otitis externa of left ear     Pancreatitis chronic     Persistent depressive disorder 11/19/2019    Renal insufficiency     proteinuria  Seizures ([facility] 2019    Severe depression ([facility] 2013    Spinal stenosis of lumbar region without neurogenic claudication 2016    MRI lumbar 12/30/15 L3-L4: There is broad-based bulging disc which appears protruding left laterally causing flattening of the ventral thecal sac. In addition, there is facet arthropathy with mild hypertrophic changes.  There is borderline central canal stenosis with  evidence of moderate left neural foraminal narrowing and mild right neural foramina narrowing.   L4-L5: There is broad-based protrud    Syncope 2017    Tinnitus of both ears 01/10/2017    Per ENT    Type 2 diabetes mellitus with stage 3 chronic kidney disease, with long-term current use of insulin ([facility] 2016    due to underlying condition with hyperosmolarity without coma    Type II or unspecified type diabetes mellitus without mention of complication, not stated as uncontrolled     uncontrolled    Uncontrolled type 2 diabetes mellitus with hyperglycemia ([facility] 7/15/2013    Unstable angina ([facility] 2021    Vitamin D deficiency     Wears glasses     for reading       Social History:    Social History     Tobacco Use    Smoking status: Former Smoker     Packs/day: 0.25     Years: 33.00     Pack years: 8.25     Types: Cigarettes     Start date: 1985     Quit date: 2020     Years since quittin.2    Smokeless tobacco: Former User     Types: Snuff     Quit date: 1995   Substance Use Topics    Alcohol use: No     Alcohol/week: 0.0 standard drinks       Pertinent Labs:    Lab Results   Component Value Date    LABA1C 7.7 (H) 10/23/2021    LABA1C 7.6 2021    LABA1C 8.0 2021     Lab Results   Component Value Date    CHOL 137 2021    TRIG 416 (H) 2021    HDL 30 (L) 2021     Lab Results   Component Value Date    CREATININE 1.46 (H) 02/10/2022    BUN 26 (H) 02/10/2022     02/10/2022    K 3.3 (L) 02/10/2022    CL 99 02/10/2022    CO2 26 02/10/2022     Lab Results   Component Value Date    ALT 11 02/10/2022         Wt Readings from Last 3 Encounters:   02/10/22 (!) 413 lb 4.8 oz (187.5 kg)   11/30/21 (!) 420 lb (190.5 kg)   11/11/21 (!) 420 lb (190.5 kg)       Current medications:  Prior to Admission medications    Medication Sig Start Date End Date Taking? Authorizing Provider   MAGNESIUM-OXIDE 400 (241.3 Mg) MG TABS tablet Take 1 tablet by mouth 2 times daily Frequency increased to 10/20/2022 2/10/22   Lorenzo Thompson MD   oxyCODONE HCl (OXY-IR) 10 MG immediate release tablet Take 1 tablet by mouth every 8 hours as needed for Pain for up to 30 days. 2/2/22 3/4/22  Lorenzo Thompson MD   pantoprazole (PROTONIX) 40 MG tablet Take 1 tablet by mouth every morning (before breakfast) 1/28/22   Lorenzo Thompson MD   nystatin (MYCOSTATIN) 255602 UNIT/ML suspension Take 5 mLs by mouth 4 times daily Swish and swallow 1/19/22   Lorenzo Thompson MD   albuterol (PROVENTIL) (2.5 MG/3ML) 0.083% nebulizer solution USE THREE MILLILITERS VIA NEBULIZATION BY MOUTH EVERY 6 HOURS AS NEEDED FOR WHEEZING OR FOR SHORTNESS OF BREATH 12/27/21   Lorenzo Thmopson MD   tiZANidine (ZANAFLEX) 4 MG tablet Take 1 tablet by mouth every 8 hours as needed (Muscle spasms) 12/23/21   Lorenzo Thompson MD   Blood Pressure KIT Diagnosis: HTN. Needs to check blood pressure 1-2 times a day until stable, then once a day. Goal blood pressure less than 135/85, and above 110/60. 12/16/21   Lorenzo Thompson MD   MAGNESIUM-OXIDE 400 (241.3 Mg) MG TABS tablet  11/17/21 2/10/22  Historical Provider, MD   allopurinol (ZYLOPRIM) 300 MG tablet Take 1 tablet by mouth daily Dose increased 12/9/2021 .   Stop if any rash develops 12/9/21   Lorenzo Thompson MD   FEROSUL 325 (65 Fe) MG tablet TAKE ONE TABLET BY MOUTH DAILY 12/1/21   Lorenzo Thompson MD   SPIRIVA RESPIMAT 2.5 MCG/ACT AERS inhaler INHALE TWO PUFFS BY MOUTH DAILY 12/1/21   MD Donato Ordonez 629-66 MCG/ACT inhaler INHALE TWO PUFFS BY MOUTH TWICE A DAY 12/1/21   June Burger MD   metoprolol tartrate (LOPRESSOR) 50 MG tablet Take 1 tablet by mouth 2 times daily 11/17/21   June Burger MD   MUCUS RELIEF 600 MG extended release tablet  11/8/21   Historical Provider, MD   lisinopril (PRINIVIL;ZESTRIL) 5 MG tablet Take 5 mg by mouth daily    Historical Provider, MD   gabapentin (NEURONTIN) 300 MG capsule TAKE ONE CAPSULE BY MOUTH THREE TIMES PER DAY 11/11/21 12/12/21  Diona Sacks, MD   butalbital-acetaminophen-caffeine [facility], [facility]) -96 MG per tablet Take 1 tablet by mouth every 8 hours as needed for Headaches 11/11/21   Diona Sacks, MD   amitriptyline (ELAVIL) 50 MG tablet Take 1 po qhs 11/11/21   Diona Sacks, MD   blood glucose monitor strips Test 3 times a day & as needed for symptoms of irregular blood glucose. Dispense sufficient amount for indicated testing frequency plus additional to accommodate PRN testing needs. One touch Ultra blue 11/1/21   June Burger MD   bumetanide (BUMEX) 1 MG tablet Take 1 tablet by mouth 2 times daily Dose decreased 10/23/2021 10/23/21   June Burger MD   ammonium lactate (LAC-HYDRIN) 12 % lotion Apply topically daily.  10/14/21   June Burger MD   venlafaxine (EFFEXOR XR) 75 MG extended release capsule TAKE ONE CAPSULE BY MOUTH DAILY WITH FOOD 10/13/21   June Burger MD   Suman Lyle 0.02-0.005 % SOLN  10/7/21   Historical Provider, MD   epoetin leslie-epbx (RETACRIT) 3000 UNIT/ML SOLN injection Inject 1 mL into the skin three times a week 10/4/21   Magy Carreon MD   nicotine (NICODERM CQ) 21 MG/24HR Place 1 patch onto the skin daily 10/4/21   Magy Carreon MD   Insulin Syringe-Needle U-100 31G X 5/16\" 1 ML MISC Use to subcutaneously inject insulin three times daily 9/22/21   June Burger MD   vitamin D3 (CHOLECALCIFEROL) 25 MCG (1000 UT) TABS tablet Take 1 tablet by mouth daily 8/25/21   Paige MD Manny   docusate sodium (COLACE) 100 MG capsule Take 1 capsule by mouth 2 times daily For constipation 8/17/21   Garry Garcia MD   latanoprost (XALATAN) 0.005 % ophthalmic solution 1 drop nightly    Historical Provider, MD   clopidogrel (PLAVIX) 75 MG tablet Take 1 tablet by mouth daily 8/11/21   Garry Garcia MD   nitroGLYCERIN (NITROSTAT) 0.4 MG SL tablet DISSOLVE 1 TAB UNDER TONGUE FOR CHEST PAIN - IF PAIN REMAINS AFTER 5 MIN, CALL 911 AND REPEAT DOSE. MAX 3 TABS IN 15 MINUTES 8/2/21   Garry Garcia MD   insulin regular human (HUMULIN R) 500 UNIT/ML concentrated injection vial Patient using 0.52ml with breakfast, 0.52ml with lunch and 0.45ml with dinner. Patient taking differently: Patient using 0.52ml with breakfast, 0.52ml with lunch and 0.40 ml with dinner. 6/28/21   Garry Garcia MD   rosuvastatin (CRESTOR) 10 MG tablet Take 1 tablet by mouth nightly Stop pravastatin 5/21/21   Garry Garcia MD   Continuous Blood Gluc Sensor (FREESTYLE CRISTINE 2 SENSOR) [facility] Patient to apply a new sensor every 14 days. RX to cover voucher patient will bring in. 5/19/21   Garry Garcia MD   Gauze Pads & Dressings 4\"X4\" PADS Twice a day dressing of right leg wound 4/23/21   Garry Garcia MD   Gauze Bandages (ROLLED GAUZE BANDAGE 4\"X2. 5YD) MISC For twice a day dressing 4/23/21   Garry Garcia MD   PROAIR  (90 Base) MCG/ACT inhaler INHALE TWO PUFFS BY MOUTH EVERY 6 HOURS AS NEEDED FOR WHEEZING OR FOR SHORTNESS OF BREATH 4/13/21   Garry Garcia MD   nystatin (MYCOSTATIN) 688510 UNIT/GM powder Apply 2 times daily in the skin folds for several months 3/31/21   Garry Garcia MD   clobetasol (TEMOVATE) 0.05 % ointment Apply topically 2 times daily for psoriasis 1/27/21   Garry Garcia MD   ketoconazole (NIZORAL) 2 % cream Apply twice a day for yeast infection in the skin folds, for 4 weeks 1/20/21   Garry Garcia MD   Lancets MISC Use to check blood sugar three times daily along with when necessary due to symptoms. 9/30/20   Yadiel Shaw MD   vitamin B-12 (CYANOCOBALAMIN) 500 MCG tablet Take 1 tablet by mouth daily 7/13/20   Natali Neves MD   Oxygen Tubing MISC by Does not apply route DX COPD. chronic respiratory failure 3/26/20   Natali Neves MD   Respiratory Therapy Supplies (NEBULIZER/TUBING/MOUTHPIECE) KIT Dx COPD needs nebulizer supplies 2/20/20   Natali Neves MD   ONE TOUCH ULTRASOFT LANCETS [facility] Patient to test blood sugar up to 4 times daily. 11/7/19   Yadiel Shaw MD   Lancet Devices (LANCING DEVICE) MISC Provide patient with lancing device appropriate for his machine/lancing needles. 6/4/19   Natali Neves MD   Handicap Placard MISC by Does not apply route Can't walk greater than 200 feet. Expires in 5 years. 2/13/19   Paige Bryant MD   fluticasone (CUTIVATE) 0.05 % cream Apply topically 2 times daily  4/19/17   Historical Provider, MD   fluticasone (FLONASE) 50 MCG/ACT nasal spray 2 sprays by Nasal route daily  Patient taking differently: 2 sprays by Nasal route daily as needed (sinus symptoms)  1/16/17   Natali Neves MD   Melatonin 10 MG TABS Take 10 mg by mouth nightly as needed (insomnia) 12/23/16   Natail Neves MD   aspirin 81 MG EC tablet Take 81 mg by mouth daily.     Historical Provider, MD       Immunizations:   Most Recent Immunizations   Administered Date(s) Administered    Leonard HANEY, Primary or Immunocompromised, PF, 100mcg/0.5mL 12/15/2021    DT (pediatric) 12/14/1998    Hepatitis B Adult (Engerix-B) 12/04/2019    Hepatitis B Adult (Recombivax HB) 12/04/2019    Influenza Virus Vaccine 10/12/2015    Influenza, MDCK Quadv, IM, PF (Flucelvax 2 yrs and older) 10/14/2021    Influenza, Quadv, IM, PF (6 mo and older Fluzone, Flulaval, Fluarix, and 3 yrs and older Afluria) 10/09/2020    Pneumococcal Polysaccharide ([id number]) 05/23/2013    Tdap (Boostrix, Adacel) 09/12/2018  Zoster Recombinant (Shingrix) 12/15/2021       Home Blood Glucose Results:   Per patient's Freestyle Lianne report below               Patient only has blood sugar information up till 1/22/22 as that is when ran out of sensors. Information above shows blood sugars have been uncontrolled. Data above is not as high as patient reported during this visit. Not surprised on patients lack of blood sugar control due to patient not taking insulin or following diet. Assessment     A1c at goal:No: 7.7 (10/23/21)    Blood Pressure at goal: 118/88 on 2/10/22  Weight at goal: No:    Physical activity: No  Physical activity at goal: No    Smoking Cessation: Yes    Cholesterol at target: Yes  Annual eye exam completed: Yes  Comprehensive Foot Exam Completed: Yes  Annual urine albumin and serum creatinine: Yes    Statin: Yes    Appropriate?: Yes  Changes made: refill provided    ACE/ARB: Yes  Appropriate?: Yes  Changes made:     Aspirin: Yes  Appropriate?: Yes  Changes made:     Eating patterns:    [x]  My plate    []  Mediterranean diet   []  Low sodium   []  DASH diet   [x]  Portion control   [x]  Reduced calorie    []  Fast food / Restaurant  []  High glycemic index foods   []  Sugary beverages   []  Other     Comment: see above    Current Medications Affecting Diabetes:  Humulin R 500    Compliant:No  Barriers to medication therapy: anxiety / depression    Medications attempted in the past:  Metformin - cardiologist took him off but patient unsure why  Januvia - PCP took him off but patient unsure why    Recent exacerbations / new problems:  Gout / Chest pain/pressure    Last office dictation reviewed: yes        type 2 DM under stable control as evidenced by A1C 7.7 on 10/23/21    Plan   Counseling at today's visit:     -Continued monitoring of blood glucose with use of Freestyle Lianne. Call with any issues with sensor.   Bring data cord to each visit.      -Resume taking insulin on regular basis:  insulin at 52 at breakfast time, 52 at lunch time and 45 with dinner.    -Try to not skip meals and eat regularly as much as possible. At a minimum eat two meals a day. Physician Follow-up:   Dr Priyanka Nelson     Medication Management Follow-up:   Diabetes Service   3 weeks as will see Dr. Priyanka Nelson in 2 weeks. Electronically signed by Rafa Chavez RPH on 2/10/2022 at 3:03 PM     For Pharmacy [address] [place] [address] in place: Yes   Recommendation Provided To: Patient/Caregiver: 4 via In person   Intervention Detail: Dose Adjustment: 1, reason: Therapy Optimization, New Rx: 1, reason: Needs Additional Therapy, Refill(s) Provided and Scheduled Appointment   Intervention Accepted By: Patient/Caregiver: 4   Time Spent (min): 45     Yesenia Rizvi [facility]. D,, BCPS, CACP  2/10/2022  3:03 PM

## 2022-02-10 NOTE — RESULT ENCOUNTER NOTE
Noted  Future Appointments  2/11/2022  8:20 AM    Nate Biggs MD [facility]          [facility]  2/24/2022  2:15 PM    David Do MD   [facility]        [facility]  3/2/2022   8:30 AM    Vickie Lewis MD     fp sc               [facility]  3/2/2022   9:30 AM    [facility] MEDICATION MGMT       [facility] MGMT        [facility]  3/2/2022   11:30 AM   [facility] [address]  3/17/2022  8:00 AM    Brijesh Lorenzo MD     [address]        [facility]  3/22/2022  10:00 AM   Amisha Scott MD        [address] [place] [address] [facility]

## 2022-02-10 NOTE — PROGRESS NOTES
CHF Clinic appointment completed. Pt was last seen in [address] [place] 02-. Weight stable at 413.3 lbs Last EF 3- = 50-55%. Pt seems to be in no distress, continues with shortness of breath and chest discomfort. Recent heart cath was 9-. No recent medication changes. Today's labs reviewed  and Potassium 3.3. Enc pt to inform his doctor of lab work. Next CHF appointment scheduled for [facility] March 2 2022 @ 11:30. Will fax order for repeat labs. Verbally reviewed importance of medication compliance with patient; patient verbalized understanding. Discussed 2000mg/day sodium restricted diet; patient verbalized understanding. Moderate daily exercise encouraged as tolerated. Discussed rest breaks as needed; patient verbalized understanding. Patient instructed to weigh self at the same time of each day, using same clothes and same scale; reinforced teaching to monitor for 3-5 lb weight increase over 1-2 days, and to notify the CHF clinic at [phone] or physician office if weight change noted. Patient verbalized understanding. Risks of smoking discussed with the patient if applicable; patient strongly discouraged to smoke. Patient verbalized understanding. Signs and symptoms of CHF discussed with patient, such as feeling more tired than normal, feeling short of breath, coughing that increases when you lie down, sudden weight gain, swelling of your feet, legs or belly. Patient verbalized understanding to notify the CHF clinic at [phone] or physician office if these symptoms occur. Compliance with plan of care and further disease process causes discussed with patient, patient encouraged to keep all follow up appointments. Patient verbalized understanding. no

## 2022-02-10 NOTE — RESULT ENCOUNTER NOTE
Please notify patient: *Improving gout and uric acid, magnesium slightly low, potassium low  Kidney function is improved  Inflammation markers are still a bit high if he has any infection at this time to let us know. Is he taking magnesium supplement 400 mg every day? If he needs refill to let me know.   If yes we will increase to twice a day if now he needs to start taking it    Then repeat magnesium and potassium levels on Friday afternoon at [facility]    Otherwise labs within normal limits  Anemia is moderate and improved, white blood cells are improved  continue current treatment    Future Appointments  2/11/2022  8:20 AM    Luz Lepe  [place]  2/24/2022  2:15 PM    Neda Abel MD   [facility]        [facility]  3/2/2022   8:30 AM    Awa Ruelas MD     fp [facility]               [facility]  3/2/2022   9:30 AM    ST MEDICATION MGMT       [facility] MED MGMT        [address]  3/2/2022   11:30 AM   [facility]F [facility]IN[facility]  [id number]  3/17/2022  8:00 AM    Raheem Andino MD     [address]        [facility]  3/22/2022  10:00 AM   Marito Ward MD        [address]

## 2022-02-10 NOTE — TELEPHONE ENCOUNTER
From: Kimberli Martin.   To: Dr. Garcia Mornin/10/2022 3:26 PM EST  Subject: Under arm    These are pictures of my under arm · S/p 3-vessel CABG 11/2015  · Continue BB, aspirin, statin  · Trop <0 02

## 2022-02-11 ENCOUNTER — HOSPITAL ENCOUNTER (OUTPATIENT)
Age: 57
Setting detail: SPECIMEN
Discharge: HOME | End: 2022-02-11

## 2022-02-11 ENCOUNTER — OFFICE VISIT (OUTPATIENT)
Dept: NEUROLOGY | Age: 57
End: 2022-02-11
Payer: COMMERCIAL

## 2022-02-11 VITALS
SYSTOLIC BLOOD PRESSURE: 103 MMHG | HEART RATE: 89 BPM | TEMPERATURE: 97.5 F | WEIGHT: 315 LBS | BODY MASS INDEX: 42.66 KG/M2 | DIASTOLIC BLOOD PRESSURE: 49 MMHG | HEIGHT: 72 IN

## 2022-02-11 DIAGNOSIS — N18.30 BENIGN HYPERTENSION WITH CKD (CHRONIC KIDNEY DISEASE) STAGE III (HCC): ICD-10-CM

## 2022-02-11 DIAGNOSIS — E08.42 DIABETIC POLYNEUROPATHY ASSOCIATED WITH DIABETES MELLITUS DUE TO UNDERLYING CONDITION (HCC): ICD-10-CM

## 2022-02-11 DIAGNOSIS — E87.6 HYPOKALEMIA: ICD-10-CM

## 2022-02-11 DIAGNOSIS — N18.30 SECONDARY DIABETES MELLITUS WITH STAGE 3 CHRONIC KIDNEY DISEASE (HCC): ICD-10-CM

## 2022-02-11 DIAGNOSIS — E83.42 HYPOMAGNESEMIA: ICD-10-CM

## 2022-02-11 DIAGNOSIS — N18.32 STAGE 3B CHRONIC KIDNEY DISEASE (HCC): ICD-10-CM

## 2022-02-11 DIAGNOSIS — N18.32 ANEMIA OF CHRONIC RENAL FAILURE, STAGE 3B (HCC): ICD-10-CM

## 2022-02-11 DIAGNOSIS — M54.40 CHRONIC LOW BACK PAIN WITH SCIATICA, SCIATICA LATERALITY UNSPECIFIED, UNSPECIFIED BACK PAIN LATERALITY: ICD-10-CM

## 2022-02-11 DIAGNOSIS — G47.33 OBSTRUCTIVE SLEEP APNEA SYNDROME: ICD-10-CM

## 2022-02-11 DIAGNOSIS — G89.29 CHRONIC LOW BACK PAIN WITH SCIATICA, SCIATICA LATERALITY UNSPECIFIED, UNSPECIFIED BACK PAIN LATERALITY: ICD-10-CM

## 2022-02-11 DIAGNOSIS — D63.1 ANEMIA OF CHRONIC RENAL FAILURE, STAGE 3B (HCC): ICD-10-CM

## 2022-02-11 DIAGNOSIS — G43.009 MIGRAINE WITHOUT AURA AND WITHOUT STATUS MIGRAINOSUS, NOT INTRACTABLE: Primary | ICD-10-CM

## 2022-02-11 DIAGNOSIS — I12.9 BENIGN HYPERTENSION WITH CKD (CHRONIC KIDNEY DISEASE) STAGE III (HCC): ICD-10-CM

## 2022-02-11 DIAGNOSIS — E13.22 SECONDARY DIABETES MELLITUS WITH STAGE 3 CHRONIC KIDNEY DISEASE (HCC): ICD-10-CM

## 2022-02-11 LAB
ABSOLUTE EOS #: 0.31 K/UL (ref 0–0.4)
ABSOLUTE IMMATURE GRANULOCYTE: 0 K/UL (ref 0–0.3)
ABSOLUTE LYMPH #: 1.02 K/UL (ref 1–4.8)
ABSOLUTE MONO #: 0.41 K/UL (ref 0.1–0.8)
ANION GAP SERPL CALCULATED.3IONS-SCNC: 15 MMOL/L (ref 9–17)
BASOPHILS # BLD: 0 % (ref 0–2)
BASOPHILS ABSOLUTE: 0 K/UL (ref 0–0.2)
BILIRUBIN URINE: NEGATIVE
BUN BLDV-MCNC: 23 MG/DL (ref 6–20)
BUN/CREAT BLD: ABNORMAL (ref 9–20)
CALCIUM SERPL-MCNC: 9.4 MG/DL (ref 8.6–10.4)
CHLORIDE BLD-SCNC: 103 MMOL/L (ref 98–107)
CO2: 27 MMOL/L (ref 20–31)
COLOR: YELLOW
COMMENT UA: NORMAL
CREAT SERPL-MCNC: 1.37 MG/DL (ref 0.7–1.2)
DIFFERENTIAL TYPE: ABNORMAL
EOSINOPHILS RELATIVE PERCENT: 3 % (ref 1–4)
GFR AFRICAN AMERICAN: >60 ML/MIN
GFR NON-AFRICAN AMERICAN: 54 ML/MIN
GFR SERPL CREATININE-BSD FRML MDRD: ABNORMAL ML/MIN/{1.73_M2}
GFR SERPL CREATININE-BSD FRML MDRD: ABNORMAL ML/MIN/{1.73_M2}
GLUCOSE BLD-MCNC: 156 MG/DL (ref 70–99)
GLUCOSE URINE: NEGATIVE
HCT VFR BLD CALC: 38.2 % (ref 40.7–50.3)
HEMOGLOBIN: 10.7 G/DL (ref 13–17)
IMMATURE GRANULOCYTES: 0 %
KETONES, URINE: NEGATIVE
LEUKOCYTE ESTERASE, URINE: NEGATIVE
LYMPHOCYTES # BLD: 10 % (ref 24–44)
MAGNESIUM: 2.4 MG/DL (ref 1.6–2.6)
MAGNESIUM: 2.4 MG/DL (ref 1.6–2.6)
MCH RBC QN AUTO: 25.7 PG (ref 25.2–33.5)
MCHC RBC AUTO-ENTMCNC: 28 G/DL (ref 28.4–34.8)
MCV RBC AUTO: 91.6 FL (ref 82.6–102.9)
MONOCYTES # BLD: 4 % (ref 1–7)
MORPHOLOGY: ABNORMAL
NITRITE, URINE: NEGATIVE
NRBC AUTOMATED: 0 PER 100 WBC
PDW BLD-RTO: 17.2 % (ref 11.8–14.4)
PH UA: 6 (ref 5–8)
PHOSPHORUS: 4.3 MG/DL (ref 2.5–4.5)
PLATELET # BLD: 218 K/UL (ref 138–453)
PLATELET ESTIMATE: ABNORMAL
PMV BLD AUTO: 10.7 FL (ref 8.1–13.5)
POTASSIUM SERPL-SCNC: 4.3 MMOL/L (ref 3.7–5.3)
POTASSIUM SERPL-SCNC: 4.3 MMOL/L (ref 3.7–5.3)
PROTEIN UA: NEGATIVE
RBC # BLD: 4.17 M/UL (ref 4.21–5.77)
RBC # BLD: ABNORMAL 10*6/UL
SEG NEUTROPHILS: 83 % (ref 36–66)
SEGMENTED NEUTROPHILS ABSOLUTE COUNT: 8.46 K/UL (ref 1.8–7.7)
SODIUM BLD-SCNC: 145 MMOL/L (ref 135–144)
SPECIFIC GRAVITY UA: 1.02 (ref 1–1.03)
TURBIDITY: CLEAR
URINE HGB: NEGATIVE
UROBILINOGEN, URINE: NORMAL
WBC # BLD: 10.2 K/UL (ref 3.5–11.3)
WBC # BLD: ABNORMAL 10*3/UL

## 2022-02-11 PROCEDURE — 1036F TOBACCO NON-USER: CPT | Performed by: PSYCHIATRY & NEUROLOGY

## 2022-02-11 PROCEDURE — G8417 CALC BMI ABV UP PARAM F/U: HCPCS | Performed by: PSYCHIATRY & NEUROLOGY

## 2022-02-11 PROCEDURE — 3017F COLORECTAL CA SCREEN DOC REV: CPT | Performed by: PSYCHIATRY & NEUROLOGY

## 2022-02-11 PROCEDURE — G8482 FLU IMMUNIZE ORDER/ADMIN: HCPCS | Performed by: PSYCHIATRY & NEUROLOGY

## 2022-02-11 PROCEDURE — 2022F DILAT RTA XM EVC RTNOPTHY: CPT | Performed by: PSYCHIATRY & NEUROLOGY

## 2022-02-11 PROCEDURE — G8427 DOCREV CUR MEDS BY ELIG CLIN: HCPCS | Performed by: PSYCHIATRY & NEUROLOGY

## 2022-02-11 PROCEDURE — 99214 OFFICE O/P EST MOD 30 MIN: CPT | Performed by: PSYCHIATRY & NEUROLOGY

## 2022-02-11 RX ORDER — GABAPENTIN 300 MG/1
CAPSULE ORAL
Qty: 90 CAPSULE | Refills: 2 | Status: SHIPPED | OUTPATIENT
Start: 2022-02-11 | End: 2022-07-19

## 2022-02-11 RX ORDER — BUTALBITAL, ACETAMINOPHEN AND CAFFEINE 50; 325; 40 MG/1; MG/1; MG/1
1 TABLET ORAL EVERY 8 HOURS PRN
Qty: 60 TABLET | Refills: 1 | Status: SHIPPED | OUTPATIENT
Start: 2022-02-11

## 2022-02-11 ASSESSMENT — ENCOUNTER SYMPTOMS
DIARRHEA: 1
SHORTNESS OF BREATH: 1
BACK PAIN: 1
COUGH: 1

## 2022-02-11 NOTE — PROGRESS NOTES
Subjective:      Patient ID: Mildred Estrada. is a 61 y.o. male. Active problem common migraine with painful diabetic neuropathy on elavil readjusting neurontin . Provoked seizures in 2019 attributed to prozac and chantixx . Scalp paresthesias which are benign along with tinnitus attributed to sensorineural hearing loss . The condition is he reports that nerve pain in feet is much improved with neurontin dosage readjustment to grade 4 over 10 tolerating neurontin well at 300 mg po tid and elavil 50 mg po qhs . He can not tolerate elavil higher than 50 mg po qhs having hallucinations with higher dose . Pain is legs and feet can be like walking on hot needles . Headaches are occurring once per week over right parietal head of pressure throbbing up to grade 8 over 10 attenuated with fioricet. He has had no seizures . He has morbid obesity and COPD getting short winded on walking short distances using wheel chair for longer distances. There will be leg giveway although bigger issue will be shortness of breath . He has diabetes mellitus with neuropathy with numbness to mid lower legs . There is chronic low back pain with baseline aching stabbing at grade 8 over 10 more with activity with radiation down left leg . He is on oxycodone qid through PMD . He has had four low back surgeries with prior pain clinic evaluation getting epidurals not wanting radiofrequency. There will be numbness of fingers . He has sleep apnea using nasal CPAP on nightly basis . There are mild memory complaints . Significant medications neurontin 300 mg po tid ,  elavil 50 mg po qhs, [id number] mg po qid  . Testing carotid US nonstenotic plaque formation , May 2017 . Cardiac 2 D echo normal LVF , EF 55-60% . Enlarged left atrium . Mild MR and TR , August 2017 . Head CT nomral brain , June 2017 .  EEG normal , July 2019      Past Medical History:   Diagnosis Date    Acute kidney injury superimposed on CKD ([facility] 4/10/2013    Acute on chronic congestive heart failure (HCC)     Acute on chronic kidney failure ([address] 07/20/2017    Acute on chronic respiratory failure ([address] 10/02/2018    Acute on chronic respiratory failure with hypoxia (HCC) 9/30/2021    Adhesive capsulitis of left shoulder 03/25/2017    Anemia, normocytic normochromic 9/12/2021    Anxiety 10/02/2016    smokes marijuana for this    Arthropathy, unspecified, other specified sites 06/13/2013    Asthma     B12 deficiency     Bilateral lower leg cellulitis 02/17/2016    Blood in stool     CAD (coronary artery disease)     Cardiovascular stress test abnormal 2018    Cellulitis of both lower extremities 05/25/2017    Cellulitis of leg, left 07/20/2017    CHF (congestive heart failure), NYHA class III (HCC) 08/14/2013    Chronic back pain     Chronic bronchitis ([facility])     Chronic headaches     was referred to neuro, testing scheduled    Chronic infective otitis externa 4/28/2017    Chronic malignant otitis externa of both ears 7/24/2020    Chronic respiratory failure (HCC)     was on vent    Chronic ulcer of left leg, with fat layer exposed ([facility] 02/22/2019    healed    Class 2 severe obesity due to excess calories with serious comorbidity and body mass index (BMI) of 35.0 to 35.9 in adult ([address]     (BMI 35.0-39.9 without comorbidity)    COPD exacerbation ([address] 11/02/2016    Diabetic neuropathy ([facility] 08/14/2013    Displacement of lumbar intervertebral disc without myelopathy 06/13/2013    Ear infection     RIGHT    Elevated troponin     Essential hypertension     Facial cellulitis 2012    Fall 03/25/2017    GERD (gastroesophageal reflux disease)     Head injury     Hearing loss in right ear     pencil pierced ear as a child    Hepatic steatosis 12/03/2015    History of general anesthesia complication     has woke up during surgery under anesthesia    History of rib fracture 12/03/2015    Chronic     Hyperlipidemia     Hypersomnia     can go multiple days without sleeping    Hypertension     Insomnia     Intolerance of continuous positive airway pressure (CPAP) ventilation 07/20/2017    Iron deficiency     Localized rash     gets frequently in axilla, groin, in any fold, on several topical treatments for this    Lymphedema of both lower extremities 4/27/2021    Magnesium deficiency     Mastoiditis of left side     Mixed conductive and sensorineural hearing loss of both ears 01/10/2017    Per ENT    Mixed type COPD (chronic obstructive pulmonary disease) ([address]     On home O2, multiple inhlaers, nebulizer    Moderate recurrent major depression ([address] 10/02/2016    Morbid obesity with BMI of 45.0-49.9, adult ([address] 06/16/2015    NSTEMI (non-ST elevated myocardial infarction) ([address]     On home oxygen therapy     3 Lpm prn    Open wound of groin 12/19/2018    healed     BARBY on CPAP     Osteoarthritis     Otitis externa of left ear     Pancreatitis chronic     Persistent depressive disorder 11/19/2019    Renal insufficiency     proteinuria    Seizures ([address] 6/27/2019    Severe depression ([address] 09/25/2013    Spinal stenosis of lumbar region without neurogenic claudication 01/06/2016    MRI lumbar 12/30/15 L3-L4: There is broad-based bulging disc which appears protruding left laterally causing flattening of the ventral thecal sac. In addition, there is facet arthropathy with mild hypertrophic changes.  There is borderline central canal stenosis with  evidence of moderate left neural foraminal narrowing and mild right neural foramina narrowing.   L4-L5: There is broad-based protrud    Syncope 04/28/2017    Tinnitus of both ears 01/10/2017    Per ENT    Type 2 diabetes mellitus with stage 3 chronic kidney disease, with long-term current use of insulin ([address] 12/26/2016    due to underlying condition with hyperosmolarity without coma    Type II or unspecified type diabetes mellitus without mention of complication, not stated as uncontrolled uncontrolled    Uncontrolled type 2 diabetes mellitus with hyperglycemia ([facility] 7/15/2013    Unstable angina ([facility] 2021    Vitamin D deficiency     Wears glasses     for reading       Past Surgical History:   Procedure Laterality Date    BACK SURGERY   (x 4) ,.2011.2012     Dr Chandan Tomas last [address]  2018    PATENT OM STENT    COLONOSCOPY  2015    hemorrhoids, poor prep, not done    COLONOSCOPY      COLONOSCOPY N/A 2021    COLONOSCOPY POLYPECTOMY SNARE/COLD BIOPSY/HOT BIOPSY/CLIP APPLICATION X1 performed by Bell Dove MD at [address] [facility]  03/2013    x 1    HAND TENDON SURGERY Left     thumb tendon repair    INTRACAPSULAR CATARACT EXTRACTION Right 2019    EYE CATARACT EMULSIFICATION IOL IMPLANT performed by Francine Branham MD at [facility] [facility] Left 2020    EYE CATARACT EMULSIFICATION IOL IMPLANT performed by Francine Branham MD at [address] [facility] ARTHROSCOPY Left     NERVE BLOCK  2015    TENS unit    NE ESOPHAGOGASTRODUODENOSCOPY TRANSORAL DIAGNOSTIC N/A 2018    EGD ESOPHAGOGASTRODUODENOSCOPY performed by Bell Dove MD at [facility] Bilateral 2012    Dr Gilford Rivers       Family History   Problem Relation Age of Onset    Heart Disease Mother          age 64 from MI   Tivis Mccreedy High Blood Pressure Mother     Diabetes Mother     High Blood Pressure Father          age 80 from CKD and Lung Fibrosis    Kidney Disease Father     Heart Disease Sister     Heart Attack Sister     Obesity Sister     Diabetes Sister        Social History     Socioeconomic History    Marital status:      Spouse name: Jitendra Dominguez Number of children: 11    Years of education: None    Highest education level: None   Occupational History    Occupation: disability     Comment: unemployed   Tobacco Use    Smoking status: Former Smoker     Packs/day: 0.25     Years: 33.00     Pack years: 8.25     Types: Cigarettes     Start date: 1985     Quit date: 2020     Years since quittin.3    Smokeless tobacco: Former User     Types: Snuff     Quit date: 1995   Vaping Use    Vaping Use: Never used   Substance and Sexual Activity    Alcohol use: No     Alcohol/week: 0.0 standard drinks    Drug use: Not Currently     Types: Marijuana Veldon Bunting)     Comment: Patient reports he quit using THC for 26 days on 2021    Sexual activity: Not Currently     Partners: Female   Other Topics Concern    None   Social History Narrative    Middle of possible separation 2016          Social Determinants of Health     Financial Resource Strain: Low Risk     Difficulty of Paying Living Expenses: Not hard at all   Food Insecurity: No Food Insecurity    Worried About Running Out of Food in the Last Year: Never true    Basil of Food in the Last Year: Never true   Transportation Needs: No Transportation Needs    Lack of Transportation (Medical): No    Lack of Transportation (Non-Medical):  No   Physical Activity:     Days of Exercise per Week: Not on file    Minutes of Exercise per Session: Not on file   Stress:     Feeling of Stress : Not on file   Social Connections:     Frequency of Communication with Friends and Family: Not on file    Frequency of Social Gatherings with Friends and Family: Not on file    Attends Christianity Services: Not on file    Active Member of Clubs or Organizations: Not on file    Attends Club or Organization Meetings: Not on file    Marital Status: Not on file   Intimate Partner Violence:     Fear of Current or Ex-Partner: Not on file    Emotionally Abused: Not on file    Physically Abused: Not on file    Sexually Abused: Not on file   Housing Stability: Unknown    Unable to Pay for Housing in the Last Year: No    Number of Places Lived in the Last Year: Not on file    Unstable Housing in the Last Year: No       Current Outpatient Medications   Medication Sig Dispense Refill    gabapentin (NEURONTIN) 300 MG capsule TAKE ONE CAPSULE BY MOUTH THREE TIMES PER DAY 90 capsule 2    butalbital-acetaminophen-caffeine (FIORICET, ESGIC) -40 MG per tablet Take 1 tablet by mouth every 8 hours as needed for Headaches 60 tablet 1    MAGNESIUM-OXIDE 400 (241.3 Mg) MG TABS tablet Take 1 tablet by mouth 2 times daily Frequency increased to 10/20/2022 180 tablet 3    pantoprazole (PROTONIX) 40 MG tablet Take 1 tablet by mouth every morning (before breakfast) 90 tablet 1    albuterol (PROVENTIL) (2.5 MG/3ML) 0.083% nebulizer solution USE THREE MILLILITERS VIA NEBULIZATION BY MOUTH EVERY 6 HOURS AS NEEDED FOR WHEEZING OR FOR SHORTNESS OF BREATH 360 mL 0    tiZANidine (ZANAFLEX) 4 MG tablet Take 1 tablet by mouth every 8 hours as needed (Muscle spasms) 90 tablet 3    allopurinol (ZYLOPRIM) 300 MG tablet Take 1 tablet by mouth daily Dose increased 12/9/2021 . Stop if any rash develops 90 tablet 1    FEROSUL 325 (65 Fe) MG tablet TAKE ONE TABLET BY MOUTH DAILY 90 tablet 3    SPIRIVA RESPIMAT 2.5 MCG/ACT AERS inhaler INHALE TWO PUFFS BY MOUTH DAILY 4 g 5    ADVAIR -21 MCG/ACT inhaler INHALE TWO PUFFS BY MOUTH TWICE A DAY 36 g 5    metoprolol tartrate (LOPRESSOR) 50 MG tablet Take 1 tablet by mouth 2 times daily 180 tablet 3    MUCUS RELIEF 600 MG extended release tablet       amitriptyline (ELAVIL) 50 MG tablet Take 1 po qhs 30 tablet 5    bumetanide (BUMEX) 1 MG tablet Take 1 tablet by mouth 2 times daily Dose decreased 10/23/2021 180 tablet 0    ammonium lactate (LAC-HYDRIN) 12 % lotion Apply topically daily.  222 mL 0    venlafaxine (EFFEXOR XR) 75 MG extended release capsule TAKE ONE CAPSULE BY MOUTH DAILY WITH FOOD 90 capsule 3    epoetin leslie-epbx (RETACRIT) 3000 UNIT/ML SOLN injection Inject 1 mL into the skin three times a week 21.9 mL 0    vitamin D3 (CHOLECALCIFEROL) 25 MCG (1000 UT) TABS tablet Take 1 tablet by mouth daily 90 tablet 1    docusate sodium (COLACE) 100 MG capsule Take 1 capsule by mouth 2 times daily For constipation 180 capsule 3    latanoprost (XALATAN) 0.005 % ophthalmic solution 1 drop nightly      clopidogrel (PLAVIX) 75 MG tablet Take 1 tablet by mouth daily 90 tablet 3    nitroGLYCERIN (NITROSTAT) 0.4 MG SL tablet DISSOLVE 1 TAB UNDER TONGUE FOR CHEST PAIN - IF PAIN REMAINS AFTER 5 MIN, CALL 911 AND REPEAT DOSE. MAX 3 TABS IN 15 MINUTES 25 tablet 2    insulin regular human (HUMULIN R) 500 UNIT/ML concentrated injection vial Patient using 0.52ml with breakfast, 0.52ml with lunch and 0.45ml with dinner. (Patient taking differently: Patient using 0.52ml with breakfast, 0.52ml with lunch and 0.40 ml with dinner.) 60 mL 3    rosuvastatin (CRESTOR) 10 MG tablet Take 1 tablet by mouth nightly Stop pravastatin 90 tablet 3    PROAIR  (90 Base) MCG/ACT inhaler INHALE TWO PUFFS BY MOUTH EVERY 6 HOURS AS NEEDED FOR WHEEZING OR FOR SHORTNESS OF BREATH 8.5 g 3    nystatin (MYCOSTATIN) 807940 UNIT/GM powder Apply 2 times daily in the skin folds for several months 1 Bottle 3    clobetasol (TEMOVATE) 0.05 % ointment Apply topically 2 times daily for psoriasis 1 Tube 3    ketoconazole (NIZORAL) 2 % cream Apply twice a day for yeast infection in the skin folds, for 4 weeks 1 Tube 3    vitamin B-12 (CYANOCOBALAMIN) 500 MCG tablet Take 1 tablet by mouth daily 90 tablet 3    fluticasone (FLONASE) 50 MCG/ACT nasal spray 2 sprays by Nasal route daily (Patient taking differently: 2 sprays by Nasal route daily as needed (sinus symptoms) ) 1 Bottle 3    Melatonin 10 MG TABS Take 10 mg by mouth nightly as needed (insomnia) 90 tablet 1    aspirin 81 MG EC tablet Take 81 mg by mouth daily.  oxyCODONE HCl (OXY-IR) 10 MG immediate release tablet Take 1 tablet by mouth every 8 hours as needed for Pain for up to 30 days.  90 tablet 0    nystatin (MYCOSTATIN) 104959 UNIT/ML suspension Take 5 mLs by mouth 4 times daily Swish and swallow (Patient not taking: Reported on 2/11/2022) 240 mL 0    Blood Pressure KIT Diagnosis: HTN. Needs to check blood pressure 1-2 times a day until stable, then once a day. Goal blood pressure less than 135/85, and above 110/60. 1 kit 0    lisinopril (PRINIVIL;ZESTRIL) 5 MG tablet Take 5 mg by mouth daily (Patient not taking: Reported on 2/11/2022)      blood glucose monitor strips Test 3 times a day & as needed for symptoms of irregular blood glucose. Dispense sufficient amount for indicated testing frequency plus additional to accommodate PRN testing needs. One touch Ultra blue 300 strip 3    ROCKLATAN 0.02-0.005 % SOLN       nicotine (NICODERM CQ) 21 MG/24HR Place 1 patch onto the skin daily (Patient not taking: Reported on 2/11/2022) 30 patch 3    Insulin Syringe-Needle U-100 31G X 5/16\" 1 ML MISC Use to subcutaneously inject insulin three times daily 300 each 2    Continuous Blood Gluc Sensor (FREESTYLE CRISTINE 2 SENSOR) [facility] Patient to apply a new sensor every 14 days. RX to cover voucher patient will bring in. 2 each 0    Gauze Pads & Dressings 4\"X4\" PADS Twice a day dressing of right leg wound 60 each 5    Gauze Bandages (ROLLED GAUZE BANDAGE 4\"X2. 5YD) MISC For twice a day dressing 60 each 5    Lancets MISC Use to check blood sugar three times daily along with when necessary due to symptoms. 300 each 2    Oxygen Tubing MISC by Does not apply route DX COPD. chronic respiratory failure 1 each 0    Respiratory Therapy Supplies (NEBULIZER/TUBING/MOUTHPIECE) KIT Dx COPD needs nebulizer supplies 1 kit 11    ONE TOUCH ULTRASOFT LANCETS MISC Patient to test blood sugar up to 4 times daily. 300 each 3    Lancet Devices (LANCING DEVICE) MISC Provide patient with lancing device appropriate for his machine/lancing needles. 1 each 1    Handicap Placard [facility] by Does not apply route Can't walk greater than 200 feet.  Expires in 5 years. 1 each 0    fluticasone (CUTIVATE) 0.05 % cream Apply topically 2 times daily        No current facility-administered medications for this visit. Allergies   Allergen Reactions    Levofloxacin Anaphylaxis     Patient reports needing epinephrine \"about 5 or 6 months ago\" for anaphylaxis (itching, hives, SOB/swelling) after receiving Levofloxacin. Previous report from 08/20/2012: Nausea/Vomiting    Lorazepam      Falls      Nsaids      CHF&CKD    Prozac [Fluoxetine Hcl] Other (See Comments)     Pt started with seizures after started taking.  Vancomycin Other (See Comments)     Itching, SOB, emesis upon infusion in ED 6/12/2019. Patient states he has had vancomycin \"a number of times\" before without issue. couldn't breath and talk, throat closed       Review of Systems   Constitutional: Positive for unexpected weight change. HENT: Positive for ear pain, hearing loss and tinnitus. Eyes: Positive for visual disturbance. Respiratory: Positive for cough and shortness of breath. Cardiovascular: Positive for chest pain and leg swelling. Gastrointestinal: Positive for diarrhea. Endocrine: Negative. Genitourinary: Positive for urgency. Musculoskeletal: Positive for arthralgias, back pain, gait problem and myalgias. Skin: Negative. Neurological: Positive for dizziness, seizures, weakness, numbness and headaches. Hematological: Negative. Psychiatric/Behavioral: Positive for dysphoric mood. The patient is nervous/anxious. Objective:   Physical Exam  Vitals:    02/11/22 0812   BP: (!) 103/49   Pulse: 89   Temp: 97.5 °F (36.4 °C)     weight: (!) 413 lb (187.3 kg)    Neurological Examination  Constitutional .General exam well groomed   Head/Ears /Nose/Throat: external ear . Normal exam  Neck and thyroid . Normal size. No bruits  Respiratory . Breathsounds clear bilaterally  Cardiovascular:  Auscultation of heart with regular rate and rhythm  Musculoskeletal. Muscle bulk and tone normal                                                           Muscle strength 5/5 strength throughout                                                                                No dysmetria or dysdiadokinesis  No tremor   Normal fine motor  Gait normal   Orientation Alert and oriented x 3   Attention and concentration normal  Short term memory normal  Language process and speech normal . No aphasia   Cranial nerve 2 Able to detect only light right eye  acuety and visual fields  Cranial nerve 3, 4 and 6 . Extraocular muscles are intact . Pupils are equal and reactive   Cranial nerve 5 . Normal strength of masseter and temporalis . Intact corneal reflex. Normal facial sensation  Cranial nerve 7 normal exam   Cranial nerve 8. Bilateral hearing loss  Cranial nerve 9 and 10. Symmetric palate elevation   Cranial nerve 11 , 5 out of 5 strength   Cranial Nerve 12 midline tongue . No atrophy  Sensation . Decreased pinprick and light touch distal lower extremities in stocking distributaion  Deep Tendon Reflexes hypoactive with absent ankle jerks   Plantar response flexor bilaterally    Assessment:       Diagnosis Orders   1. Migraine without aura and without status migrainosus, not intractable     2. Diabetic polyneuropathy associated with diabetes mellitus due to underlying condition ([facility]     3. Chronic low back pain with sciatica, sciatica laterality unspecified, unspecified back pain laterality     4.  Obstructive sleep apnea syndrome     He is to continue current regimen     Plan:      As above         Diona Sacks, MD

## 2022-02-12 NOTE — RESULT ENCOUNTER NOTE
Mychart comment sent to patient normal magnesium and potassium .   Future Appointments  2/24/2022  2:15 PM    Yanni Higuera MD   [facility]al[facility]rv        [facility]  3/2/2022   8:30 AM    Daniel Sanchez MD     fp sc               [facility]  3/2/2022   9:30 AM    ST MEDICATION MGMT       [facility] MED MGMT        [address]  3/2/2022   11:30 AM   [facility] CHF CLINIC  [place] [address]  3/17/2022  8:00 AM    Bonnie Brown MD     [address]        [facility]  3/22/2022  10:00 AM   Scar Arriaza MD        [address]

## 2022-02-15 LAB
6-ACETYLMORPHINE, UR: NOT DETECTED
7-AMINOCLONAZEPAM, URINE: NOT DETECTED
ALPHA-OH-ALPRAZ, URINE: NOT DETECTED
ALPHA-OH-MIDAZOLAM, URINE: NOT DETECTED
ALPRAZOLAM, URINE: NOT DETECTED
AMPHETAMINES, URINE: NOT DETECTED
BARBITURATES, URINE: NOT DETECTED
BENZOYLECGONINE, UR: NOT DETECTED
BUPRENORPHINE URINE: NOT DETECTED
CARISOPRODOL, UR: NOT DETECTED
CLONAZEPAM, URINE: NOT DETECTED
CODEINE, URINE: NOT DETECTED
CREATININE URINE: 83.3 MG/DL (ref 20–400)
DIAZEPAM, URINE: NOT DETECTED
DRUGS EXPECTED, UR: NORMAL
EER HI RES INTERP UR: NORMAL
ETHYL GLUCURONIDE UR: NOT DETECTED
FENTANYL URINE: NOT DETECTED
GABAPENTIN: PRESENT
HYDROCODONE, URINE: NOT DETECTED
HYDROMORPHONE, URINE: NOT DETECTED
LORAZEPAM, URINE: NOT DETECTED
MARIJUANA METAB, UR: PRESENT
MDA, UR: NOT DETECTED
MDEA, EVE, UR: NOT DETECTED
MDMA URINE: NOT DETECTED
MEPERIDINE METAB, UR: NOT DETECTED
METHADONE, URINE: NOT DETECTED
METHAMPHETAMINE, URINE: NOT DETECTED
METHYLPHENIDATE: NOT DETECTED
MIDAZOLAM, URINE: NOT DETECTED
MORPHINE URINE: NOT DETECTED
NALOXONE URINE: NOT DETECTED
NORBUPRENORPHINE, URINE: NOT DETECTED
NORDIAZEPAM, URINE: NOT DETECTED
NORFENTANYL, URINE: NOT DETECTED
NORHYDROCODONE, URINE: NOT DETECTED
NOROXYCODONE, URINE: PRESENT
NOROXYMORPHONE, URINE: NOT DETECTED
OXAZEPAM, URINE: NOT DETECTED
OXYCODONE URINE: PRESENT
OXYMORPHONE, URINE: PRESENT
PAIN MANAGEMENT DRUG PANEL INTERP, URINE: NORMAL
PAIN MGT DRUG PANEL, HI RES, UR: NORMAL
PCP,URINE: NOT DETECTED
PHENTERMINE, UR: NOT DETECTED
PREGABALIN: NOT DETECTED
TAPENTADOL, URINE: NOT DETECTED
TAPENTADOL-O-SULFATE, URINE: NOT DETECTED
TEMAZEPAM, URINE: NOT DETECTED
TRAMADOL, URINE: NOT DETECTED
ZOLPIDEM METABOLITE (ZCA), URINE: NOT DETECTED
ZOLPIDEM, URINE: NOT DETECTED

## 2022-02-16 NOTE — RESULT ENCOUNTER NOTE
Please notify patient: could try [place], which is associated with the board of pharmacy coodinating our activities and prescribing    He might also need to find a provider for marijuana through this website, [facility] providers cannot give letters of support    Future Appointments  2/24/2022  2:15 PM    Paulette Mendoza MD   [facility]        [facility]  3/2/2022   8:30 AM    Almas Mckeon MD     fp sc               [facility]  3/2/2022   9:30 AM    ST MEDICATION MGMT       [facility] MED MGMT        [facility]  3/2/2022   11:30 AM   [facility] [id number]  3/17/2022  8:00 AM    Niki Maldonado MD     [facility]        [facility]  3/22/2022  10:00 AM   Patricia Kraus MD        [facility] [employer]

## 2022-02-16 NOTE — RESULT ENCOUNTER NOTE
Please notify patient: Please advise patient he is not to use marijuana, alternatively he can obtain a marijuana card he promised me he will stop using marijuana, it can damage his lungs and make his shortness of breath even worse.   I am not allowed to refill Percocet with gabapentin while taking marijuana without the card, to let me know what his thoughts are    Future Appointments  2/24/2022  2:15 PM    [facility]        [facility]  3/2/2022   8:30 AM    Ezequiel Currie MD     fp sc               [facility]  3/2/2022   9:30 AM    [facility] MEDICATION MGMT       [facility] MGMT        [facility]  3/2/2022   11:30 AM   [facility] [address]  3/17/2022  8:00 AM    Leanne Harry MD     [facility]        [facility]  3/22/2022  10:00 AM   Michelle Lomeli MD        [facility] [employer] [facility]

## 2022-02-17 ENCOUNTER — TELEPHONE (OUTPATIENT)
Dept: PHARMACY | Age: 57
End: 2022-02-17

## 2022-02-17 NOTE — TELEPHONE ENCOUNTER
 Called to check on how patient's blood sugars were trending and if he was able to resume more normal meals and insulin use. Patient indicates his blood sugars have been much better and he has been taking his insulin and eating better. States blood sugar this am was 117. Does report episode of hypoglycemia last night about 2am which is first since our appt last week per patient. Patient did not skip meal prior (yesteray). Does report lowering his am insulin dose to 0.3 this morning. Asked patient to keep record of when he lowers his insulin dose and to what and bring to next appt. Yesenia Rizvi [facility],Pharm. D,, BCPS, CACP  2/17/2022  11:35 AM       [facility]

## 2022-02-22 ENCOUNTER — TELEPHONE (OUTPATIENT)
Dept: FAMILY MEDICINE CLINIC | Age: 57
End: 2022-02-22

## 2022-02-22 NOTE — TELEPHONE ENCOUNTER
Please check with James Hernandez to find another appointment before 2/28/2022 or quit him on a wait list, when you get cancellations, video visit okay.     We did not change the policy ,we always had an appointment to fill this paperwork at least in the past 2 years  Forms are in my office  Future Appointments   Date Time Provider [place]   2/24/2022  2:15 PM Anthony Nair MD [facility]   3/2/2022  8:30 AM Thomas Garcia MD  sc [facility]   3/2/2022  9:30 AM ST MEDICATION MGMT [facility] MED MGMT [facility]   3/2/2022 11:30 AM [facility] CHF CLINIC RM [address]   3/17/2022  8:00 AM Margarita Llanes MD [facility]   3/22/2022 10:00 AM Sunshine Gil MD [address] [place] [address]

## 2022-02-22 NOTE — TELEPHONE ENCOUNTER
Patient wife called office, stating that they have an upcoming appt on 03/2/22, stated patient only prefers to have visit with . These forms will need to be filled out and sent by 02/28/2022. I did attempt to find a sooner appt in order for forms to be filled out. Patient's daughter did state that in the past appts were never needs for these forms to be filled out. Please advise. Please advise.

## 2022-02-23 RX ORDER — MAGNESIUM OXIDE 400 MG/1
TABLET ORAL
COMMUNITY
Start: 2022-02-10 | End: 2022-03-07 | Stop reason: SDUPTHER

## 2022-02-23 ASSESSMENT — PATIENT HEALTH QUESTIONNAIRE - PHQ9
SUM OF ALL RESPONSES TO PHQ QUESTIONS 1-9: 0
1. LITTLE INTEREST OR PLEASURE IN DOING THINGS: 0
SUM OF ALL RESPONSES TO PHQ QUESTIONS 1-9: 0
SUM OF ALL RESPONSES TO PHQ9 QUESTIONS 1 & 2: 0
2. FEELING DOWN, DEPRESSED OR HOPELESS: 0
SUM OF ALL RESPONSES TO PHQ QUESTIONS 1-9: 0
SUM OF ALL RESPONSES TO PHQ QUESTIONS 1-9: 0

## 2022-02-23 NOTE — PROGRESS NOTES
Medical, Family, and Social History reviewed and does contribute to the patient presenting condition    Health Maintenance   Topic Date Due    Pneumococcal 0-64 years Vaccine (2 of 4 - PCV13) 05/23/2014    Diabetic retinal exam  10/24/2019    Diabetic foot exam  05/02/2020    A1C test (Diabetic or Prediabetic)  01/23/2022    Shingles Vaccine (2 of 2) 02/09/2022    Annual Wellness Visit (AWV)  02/24/2022    Lipid screen  09/12/2022    Depression Monitoring  12/15/2022    Potassium monitoring  02/11/2023    Creatinine monitoring  02/11/2023    Colorectal Cancer Screen  04/12/2024    DTaP/Tdap/Td vaccine (3 - Td or Tdap) 09/12/2028    Hepatitis B vaccine  Completed    Flu vaccine  Completed    COVID-19 Vaccine  Completed    Hepatitis C screen  Completed    HIV screen  Completed    Hepatitis A vaccine  Aged Out    Hib vaccine  Aged Out    Meningococcal (ACWY) vaccine  Aged Out

## 2022-02-24 ENCOUNTER — TELEPHONE (OUTPATIENT)
Dept: PHARMACY | Age: 57
End: 2022-02-24

## 2022-02-24 ENCOUNTER — TELEMEDICINE (OUTPATIENT)
Dept: FAMILY MEDICINE CLINIC | Age: 57
End: 2022-02-24
Payer: COMMERCIAL

## 2022-02-24 ENCOUNTER — TELEPHONE (OUTPATIENT)
Dept: FAMILY MEDICINE CLINIC | Age: 57
End: 2022-02-24

## 2022-02-24 DIAGNOSIS — I13.0 BENIGN HYPERTENSIVE HEART AND CKD, STAGE 3 (GFR 30-59), W CHF (HCC): Primary | ICD-10-CM

## 2022-02-24 DIAGNOSIS — F33.41 RECURRENT MAJOR DEPRESSIVE DISORDER, IN PARTIAL REMISSION (HCC): ICD-10-CM

## 2022-02-24 DIAGNOSIS — J44.9 CHRONIC OBSTRUCTIVE PULMONARY DISEASE, UNSPECIFIED COPD TYPE (HCC): ICD-10-CM

## 2022-02-24 DIAGNOSIS — M48.061 SPINAL STENOSIS OF LUMBAR REGION WITHOUT NEUROGENIC CLAUDICATION: ICD-10-CM

## 2022-02-24 DIAGNOSIS — E11.42 TYPE 2 DIABETES MELLITUS WITH DIABETIC POLYNEUROPATHY, WITH LONG-TERM CURRENT USE OF INSULIN (HCC): ICD-10-CM

## 2022-02-24 DIAGNOSIS — J96.11 CHRONIC RESPIRATORY FAILURE WITH HYPOXIA (HCC): ICD-10-CM

## 2022-02-24 DIAGNOSIS — N18.30 BENIGN HYPERTENSIVE HEART AND CKD, STAGE 3 (GFR 30-59), W CHF (HCC): Primary | ICD-10-CM

## 2022-02-24 DIAGNOSIS — Z79.4 TYPE 2 DIABETES MELLITUS WITH DIABETIC POLYNEUROPATHY, WITH LONG-TERM CURRENT USE OF INSULIN (HCC): ICD-10-CM

## 2022-02-24 LAB
AVERAGE GLUCOSE: NORMAL
HBA1C MFR BLD: 8.8 %
HBA1C MFR BLD: 8.8 %

## 2022-02-24 PROCEDURE — 99214 OFFICE O/P EST MOD 30 MIN: CPT | Performed by: FAMILY MEDICINE

## 2022-02-24 PROCEDURE — 3017F COLORECTAL CA SCREEN DOC REV: CPT | Performed by: FAMILY MEDICINE

## 2022-02-24 PROCEDURE — 3046F HEMOGLOBIN A1C LEVEL >9.0%: CPT | Performed by: FAMILY MEDICINE

## 2022-02-24 PROCEDURE — G8427 DOCREV CUR MEDS BY ELIG CLIN: HCPCS | Performed by: FAMILY MEDICINE

## 2022-02-24 PROCEDURE — 2022F DILAT RTA XM EVC RTNOPTHY: CPT | Performed by: FAMILY MEDICINE

## 2022-02-24 ASSESSMENT — ENCOUNTER SYMPTOMS
COUGH: 1
DIARRHEA: 0
CONSTIPATION: 0
ABDOMINAL DISTENTION: 0
CHEST TIGHTNESS: 0
SHORTNESS OF BREATH: 1
BACK PAIN: 1
ABDOMINAL PAIN: 1
WHEEZING: 0
NAUSEA: 0
VOMITING: 0

## 2022-02-24 NOTE — TELEPHONE ENCOUNTER
Per Jacqueline Rockwell a fax request for medical office note/recent A1C from patient endo ,    Confirmation received.

## 2022-02-24 NOTE — TELEPHONE ENCOUNTER
Patient called to indicate he has had several episodes of hypoglycemia in last few days with blood sugars going down to 40/50's at 2-3am. Patient went to see Dr. Galina Alexandre this am and had insulin changed to the following:    Increased breakfast to .60  Continued lunch at .55  Reduced dinner to .25    Patient encouraged to call with even one occurrence of hypoglycemia! Will follow up with patient in medication management at scheduled appt on 3/2/22. Yesenia Rizvi [facility],Pharm. D,, BCPS, CACP  2/24/2022  4:17 PM

## 2022-02-24 NOTE — PROGRESS NOTES
2022    TELEHEALTH EVALUATION -- Audio/Visual (During OPYNZ-69 public health emergency)      Sagar Dennis (:  1965) is a 61 y.o. male,Established patient, here for evaluation of the following chief complaint(s): Lower Back Pain (FMLA FORMS), COPD, Diabetes, Hypertension, and Congestive Heart Failure        ASSESSMENT/PLAN:    1. Benign hypertensive heart and CKD, stage 3 (GFR 30-59), w CHF (HCC)  Stable chronic kidney disease stage III  GFR 54 on 2022, 59 on 11/15/2021   CHF worsening overall    Lab Results   Component Value Date    LVEF 46 2021    LVEFMODE Echo 2017     EF 50 to 55% on 3/25/2021 echo 2D  EF 55 to 60% on 2017. Well-controlled hypertension  Recheck labs  To continue  lisinopril 5 mg daily, Bumex 2 mg twice a day, metoprolol 50 mg twice a day  -     Basic Metabolic Panel; Future  -     Brain Natriuretic Peptide; Future  -     Magnesium; Future  -     Phosphorus; Future  -     TSH; Future  -     Vitamin D 25 Hydroxy; Future  He will restart to go to congestive heart failure  Also advised him and his wife to make appointment with cardiologist  2. Chronic respiratory failure with hypoxia (HCC)  Stable  Continue oxygen at 3 L/min, continue to abstain from smoking    3. Type 2 diabetes mellitus with diabetic polyneuropathy, with long-term current use of insulin (HCC)  Worsening  He says A1c at Dr. Primo Draper this morning was 8.8, I requested the report. Lab Results   Component Value Date    LABA1C 7.7 (H) 10/23/2021    LABA1C 7.6 2021    LABA1C 8.0 2021       -     Basic Metabolic Panel; Future  -     TSH; Future  -     Vitamin B12 & Folate; Future  He continues to benefit from using floresita  -advised home blood glucose testing multiple times throughout the day 4-6 times  -daily feet exam, Foot care: advised to wash feet daily, pat dry and apply lotion at night, avoiding between toes.  Need to look at feet daily and report to a physician any signs of inflammation or skin damage  -annual dilated eye exam  -Low carb, low fat diet, increase fruits and vegetables, and exercise 4-5 times a day 30-40 minutes a day discussed  -continue current treatment, and follow-up with pharmacist and Dr. Bakari Bradford    -continue Aspirin  81 mg  -continue lisinopril ACEI and statin Crestor    4. Chronic obstructive pulmonary disease, unspecified COPD type (HCC)  Stable  Continue oxygen at 3 L/min continuously, abstain from smoking, Advair 2 puffs twice a day, Spiriva daily, correct use of inhalers discussed  Also using nebulizer every 6 hours as needed    5. Recurrent major depressive disorder, in partial remission (HCC)  Stable  Continue with Elavil, venlafaxine, melatonin as needed at night  Patient has good family support     6. Spinal stenosis of lumbar region without neurogenic claudication  Likely worsening due to wear and tear nature of the disease  He benefits from pain medication, oxycodone 10 mg every 8 hours as needed, tizanidine as needed, Elavil, repositioning, heating pad  Using edibles THC  Patient has history of 4 back surgeries which failed to improve his back pain      FMLA for his wife completed, for appointments and flareups, to have help with ADLs, transportation, emotional support, and care coordination with the appointments. Controlled Substance Monitoring:    Acute and Chronic Pain Monitoring:   RX Monitoring 2/24/2022   Attestation -   Periodic Controlled Substance Monitoring Possible medication side effects, risk of tolerance/dependence & alternative treatments discussed. ;No signs of potential drug abuse or diversion identified. ;Assessed functional status. Chronic Pain > 50 MEDD -   Chronic Pain > 80 MEDD -         Miguel received counseling on the following healthy behaviors: nutrition, exercise, medication adherence, tobacco cessation and weight loss.   Reviewed prior labs and health maintenance  Discussed use, benefit, and side effects of prescribed medications. Barriers to medication compliance addressed. Patient given educational materials - see patient instructions  All patient questions answered. Patient voiced understanding. The patient's past medical,surgical, social, and family history as well as his current medications and allergies were reviewed as documented in today's encounter. Medications, labs, diagnostic studies, consultations and follow-up as documented in this encounter. Return for KEEP APPT. Please obtain A1c from Dr. Arline Quintero done today. FMLA completed, where to come to sign into places, then please fax it, scanning in the chart, and if needed give to patient    Future Appointments   Date Time Provider William Hernandez   2022  2:15 PM MD [facility]in [facility]   3/2/2022  8:30 AM Lena Desir MD fp sc [facility]   3/2/2022  9:30 AM [facility] MEDICATION MGMT [facility] MED MGMT [address]   3/2/2022 11:30 AM [facility] CHF CLINIC RM [address]   3/17/2022  8:00 AM Daniel Plaza MD [facility]   3/22/2022 10:00 AM MD Ronaldo Gordillo URO [facility]         SUBJECTIVE/OBJECTIVE:  Luis Benoit. (:  1965) has requested an audio/video evaluation for the following concern(s):Lower Back Pain (FMLA FORMS), COPD, Diabetes, and Hypertension    Patient is at home, which his wife Harmeet Gillette and his daughter Jamie Case, who are present during this video visit. He needs the FMLA renewal for his wife    Has known hypertension, CHF, Chronic kidney disease, coronary artery disease with 1 stent    He says he will start going to [address] [place] [address] again. he  is not exercising and is adherent to low salt diet. Blood pressure is Fluctuating at home. Cardiac symptoms dyspnea, fatigue, lower extremity edema and orthopnea.  Patient denies chest pain, chest pressure/discomfort, claudication, exertional chest pressure/discomfort, irregular heart beat, near-syncope, palpitations, paroxysmal nocturnal dyspnea, syncope and tachypnea. Cardiovascular risk factors: advanced age (older than 54 for men, 72 for women), diabetes mellitus, dyslipidemia, hypertension, male gender, obesity (BMI >= 30 kg/m2), sedentary lifestyle and smoking/ tobacco exposure. Use of agents associated with hypertension: none. History of target organ damage: angina/ prior myocardial infarction, chronic kidney disease, heart failure, peripheral artery disease and prior coronary revascularization. has 1 stent        Blood pressure 142/74, fluctuating    BP Readings from Last 3 Encounters:   02/11/22 (!) 103/49   02/10/22 118/88   11/24/21 (!) 150/60        Pulse is Normal.    Pulse Readings from Last 3 Encounters:   02/11/22 89   02/10/22 76   11/24/21 94       Diabetes mellitus type 2 with polyneuropathy  Seen by Dr. Bakair Bradford this morning, which changed the Humulin R 500  0.60 in am--increased   0.55 lunch-same  0.25 with dinner-decreased    Has Jean Leon, he also follows with clinical pharmacist at [facility] Blood Glucose was dropping in the middle of the night 42, 59  Home Blood Glucose 147 this morning    A1c at Dr. Bakari Bradford 8.8 today    Started pop again, and eating too much sugar   His wife says she will help him cut down the sugar    Lab Results   Component Value Date    LABA1C 7.7 (H) 10/23/2021    LABA1C 7.6 07/20/2021    LABA1C 8.0 04/23/2021     Has chronic respiratory failure and COPD, on 3 L of oxygen at all times  Pulse ox 96% while on oxygen. Patient reports dyspnea on exertion and cough at baseline. Denies wheezing or chest pain. Depression is stable, in remission  Has good family support    PHQ-2 Over the past 2 weeks, how often have you been bothered by any of the following problems? Little interest or pleasure in doing things: Not at all  Feeling down, depressed, or hopeless: Not at all  PHQ-2 Score: 0  PHQ-9 Over the past 2 weeks, how often have you been bothered by any of the following problems?   PHQ-9 Total Score: 0  PHQ-9 Total Score: 0    PHQ Scores 2/23/2022 12/15/2021 7/20/2021 4/23/2021 2/23/2021 1/27/2021 2/20/2020   PHQ2 Score 0 0 0 6 0 0 3   PHQ9 Score 0 0 0 13 0 0 17     Chronic back pain, for many years  Pain is midline, and on the sides, for many years, radiates to the sides into the left leg, has difficulty walking due to pain, has been using either wheelchair , walker or cane when pain is severe. Has history of 4 back surgeries. Intensity of pain goes up to 4-5 out of 10 today, can go up to 8 out of 10 with certain activities. He cannot bend over and he needs help from family members to get dressed in the lower part of the body. He takes pain medication, denies side effects, he is aware that can decrease the breathing, he did receive Narcan and he has it at home, he knows how to use it. Reviewing [place]ARRS, he does not have Narcan given in the past 1 year, I will refill it. Review of Systems   Constitutional: Positive for fatigue and unexpected weight change. Negative for activity change, appetite change, chills, diaphoresis and fever. HENT: Positive for hearing loss (hearing aids, improved). Eyes: Positive for visual disturbance (stable, chronic). Respiratory: Positive for cough and shortness of breath. Negative for chest tightness and wheezing. On CPAP, but  unable to use  On continuous oxygen at home, at 3 L/min   Cardiovascular: Positive for leg swelling (improved). Negative for chest pain and palpitations. Gastrointestinal: Positive for abdominal pain (stomach, on and off). Negative for abdominal distention, constipation, diarrhea, nausea and vomiting. He is on pantoprazole for stomach pain. Endocrine: Negative for cold intolerance, heat intolerance, polydipsia, polyphagia and polyuria. Genitourinary: Negative for difficulty urinating, dysuria, frequency and hematuria. Musculoskeletal: Positive for arthralgias, back pain, gait problem and joint swelling.         Has walker and cane at home, uses wheelchair when going to the appointments, one of his family members coming with him, either his daughter or his wife. Allergic/Immunologic: Positive for immunocompromised state (due to diabetes). Neurological: Positive for numbness (feet, numbness and burning in his feet since 2000). Hematological: Bruises/bleeds easily. Psychiatric/Behavioral: Negative for dysphoric mood, self-injury, sleep disturbance and suicidal ideas. The patient is nervous/anxious. Prior to Visit Medications    Medication Sig Taking? Authorizing Provider   magnesium oxide (MAG-OX) 400 MG tablet  Yes Historical Provider, MD   gabapentin (NEURONTIN) 300 MG capsule TAKE ONE CAPSULE BY MOUTH THREE TIMES PER DAY Yes Lisa Navarro MD   butalbital-acetaminophen-caffeine (FIORICET, ESGIC) -96 MG per tablet Take 1 tablet by mouth every 8 hours as needed for Headaches Yes Lisa Navarro MD   MAGNESIUM-OXIDE 400 (241.3 Mg) MG TABS tablet Take 1 tablet by mouth 2 times daily Frequency increased to 10/20/2022 Yes Lena Desir MD   oxyCODONE HCl (OXY-IR) 10 MG immediate release tablet Take 1 tablet by mouth every 8 hours as needed for Pain for up to 30 days. Yes Lena Desir MD   pantoprazole (PROTONIX) 40 MG tablet Take 1 tablet by mouth every morning (before breakfast) Yes Lena Desir MD   nystatin (MYCOSTATIN) 941266 UNIT/ML suspension Take 5 mLs by mouth 4 times daily Swish and swallow Yes Lena Desir MD   albuterol (PROVENTIL) (2.5 MG/3ML) 0.083% nebulizer solution USE THREE MILLILITERS VIA NEBULIZATION BY MOUTH EVERY 6 HOURS AS NEEDED FOR WHEEZING OR FOR SHORTNESS OF BREATH Yes Lena Desir MD   tiZANidine (ZANAFLEX) 4 MG tablet Take 1 tablet by mouth every 8 hours as needed (Muscle spasms) Yes Lena Desir MD   Blood Pressure KIT Diagnosis: HTN. Needs to check blood pressure 1-2 times a day until stable, then once a day.  Goal blood pressure less than 135/85, and above 110/60. Yes June Burger MD   allopurinol (ZYLOPRIM) 300 MG tablet Take 1 tablet by mouth daily Dose increased 12/9/2021 . Stop if any rash develops Yes June Burger MD   FEROSUL 325 (65 Fe) MG tablet TAKE ONE TABLET BY MOUTH DAILY Yes June Burger MD   SPIRIVA RESPIMAT 2.5 MCG/ACT AERS inhaler INHALE TWO PUFFS BY MOUTH DAILY Yes June Burger MD   ADVAIR -21 MCG/ACT inhaler INHALE TWO PUFFS BY MOUTH TWICE A DAY Yes June Burger MD   metoprolol tartrate (LOPRESSOR) 50 MG tablet Take 1 tablet by mouth 2 times daily Yes June Burger MD   MUCUS RELIEF 600 MG extended release tablet  Yes Historical Provider, MD   lisinopril (PRINIVIL;ZESTRIL) 5 MG tablet Take 5 mg by mouth daily  Yes Historical Provider, MD   amitriptyline (ELAVIL) 50 MG tablet Take 1 po qhs Yes Diona Sacks, MD   blood glucose monitor strips Test 3 times a day & as needed for symptoms of irregular blood glucose. Dispense sufficient amount for indicated testing frequency plus additional to accommodate PRN testing needs. One touch Ultra blue Yes June Burger MD   bumetanide (BUMEX) 1 MG tablet Take 1 tablet by mouth 2 times daily Dose decreased 10/23/2021 Yes June Burger MD   ammonium lactate (LAC-HYDRIN) 12 % lotion Apply topically daily.  Yes June Burger MD   venlafaxine (EFFEXOR XR) 75 MG extended release capsule TAKE ONE CAPSULE BY MOUTH DAILY WITH FOOD Yes June Bruger MD   ROCKLATAN 0.02-0.005 % SOLN  Yes Historical Provider, MD   epoetin leslie-epbx (RETACRIT) 3000 UNIT/ML SOLN injection Inject 1 mL into the skin three times a week Yes Magy Carreon MD   nicotine (NICODERM CQ) 21 MG/24HR Place 1 patch onto the skin daily Yes Magy Carreon MD   Insulin Syringe-Needle U-100 31G X 5/16\" 1 ML MISC Use to subcutaneously inject insulin three times daily Yes June Burger MD   vitamin D3 (CHOLECALCIFEROL) 25 MCG (1000 UT) TABS tablet Take 1 tablet by mouth daily Yes Juliane Dwyer MD   docusate sodium (COLACE) 100 MG capsule Take 1 capsule by mouth 2 times daily For constipation Yes Juliane Dwyer MD   latanoprost (XALATAN) 0.005 % ophthalmic solution 1 drop nightly Yes Stewart Henriquez MD   clopidogrel (PLAVIX) 75 MG tablet Take 1 tablet by mouth daily Yes Juliane Dwyer MD   nitroGLYCERIN (NITROSTAT) 0.4 MG SL tablet DISSOLVE 1 TAB UNDER TONGUE FOR CHEST PAIN - IF PAIN REMAINS AFTER 5 MIN, CALL 911 AND REPEAT DOSE. MAX 3 TABS IN 15 MINUTES Yes Juliane Dwyer MD   insulin regular human (HUMULIN R) 500 UNIT/ML concentrated injection vial Patient using 0.52ml with breakfast, 0.52ml with lunch and 0.45ml with dinner. Patient taking differently: Patient using 0.52ml with breakfast, 0.52ml with lunch and 0.40 ml with dinner. Yes Juliane Dwyer MD   rosuvastatin (CRESTOR) 10 MG tablet Take 1 tablet by mouth nightly Stop pravastatin Yes Juliane Dwyer MD   Continuous Blood Gluc Sensor (FREESTYLE CRISTINE 2 SENSOR) [facility] Patient to apply a new sensor every 14 days. RX to cover voucher patient will bring in. Yes Juliane Dwyer MD   Gauze Pads & Dressings 4\"X4\" PADS Twice a day dressing of right leg wound Yes Juliane Dwyer MD   Gauze Bandages (ROLLED GAUZE BANDAGE 4\"X2. 5YD) MISC For twice a day dressing Yes Juliane Dwyer MD   PROAIR  (90 Base) MCG/ACT inhaler INHALE TWO PUFFS BY MOUTH EVERY 6 HOURS AS NEEDED FOR WHEEZING OR FOR SHORTNESS OF BREATH Yes Juliane Dwyer MD   nystatin (MYCOSTATIN) 128148 UNIT/GM powder Apply 2 times daily in the skin folds for several months Yes Juliane Dwyer MD   clobetasol (TEMOVATE) 0.05 % ointment Apply topically 2 times daily for psoriasis Yes Juliane Dwyer MD   ketoconazole (NIZORAL) 2 % cream Apply twice a day for yeast infection in the skin folds, for 4 weeks Yes Julaine Dwyer MD   Lancets MISC Use to check blood sugar three times daily along with when necessary due to symptoms. Yes Woody Su MD   vitamin B-12 (CYANOCOBALAMIN) 500 MCG tablet Take 1 tablet by mouth daily Yes Thomas Garcia MD   Oxygen Tubing MISC by Does not apply route DX COPD. chronic respiratory failure Yes Thomas Garcia MD   Respiratory Therapy Supplies (NEBULIZER/TUBING/MOUTHPIECE) KIT Dx COPD needs nebulizer supplies Yes Thomas Garcia MD   ONE TOUCH ULTRASOFT LANCETS [address] [facility] Patient to test blood sugar up to 4 times daily. Yes Woody Su MD   Lancet Devices (LANCING DEVICE) MISC Provide patient with lancing device appropriate for his machine/lancing needles. Yes Thomas Garcia MD   Handicap Placard MISC by Does not apply route Can't walk greater than 200 feet. Expires in 5 years. Yes Thomas Garcia MD   fluticasone (CUTIVATE) 0.05 % cream Apply topically 2 times daily  Yes Historical Provider, MD   fluticasone (FLONASE) 50 MCG/ACT nasal spray 2 sprays by Nasal route daily  Patient taking differently: 2 sprays by Nasal route daily as needed (sinus symptoms)  Yes Paige Bryant MD   Melatonin 10 MG TABS Take 10 mg by mouth nightly as needed (insomnia) Yes Thomas Garcia MD   aspirin 81 MG EC tablet Take 81 mg by mouth daily.  Yes Historical Provider, MD       Social History     Tobacco Use    Smoking status: Former Smoker     Packs/day: 0.25     Years: 33.00     Pack years: 8.25     Types: Cigarettes     Start date: 1985     Quit date: 2020     Years since quittin.3    Smokeless tobacco: Former User     Types: Snuff     Quit date: 1995   Vaping Use    Vaping Use: Never used   Substance Use Topics    Alcohol use: No     Alcohol/week: 0.0 standard drinks    Drug use: Not Currently     Types: Marijuana [place])     Comment: Patient reports he quit using THC for 26 days on 2021          PHYSICAL EXAMINATION:    Vital Signs: (As obtained by patient/caregiver or practitioner observation)  -vital signs stable and within normal limits except morbid obesity per BMI, BMI 56.01 kg/M2 and hypoxia improved with oxygen  Patient-Reported Vitals 2/24/2022   Patient-Reported Weight 419 lbs   Patient-Reported Height 61\"   Patient-Reported Systolic 439   Patient-Reported Diastolic 74   Patient-Reported Pulse 86   Patient-Reported Temperature 98.6   Patient-Reported SpO2 96 % at 3 l/min continuously           Intensity of pain is:4-5/10       Constitutional: [x] Appears well-developed and well-nourished [x] No apparent distress      [x] Abnormal-obese    Mental status  [x] Alert and awake  [x] Oriented to person/place/time [x]Able to follow commands      Eyes:  EOM    [x]  Normal  [] Abnormal-  Sclera  [x]  Normal  [] Abnormal -         Discharge [x]  None visible  [] Abnormal -    HENT:   [x] Normocephalic, atraumatic. [] Abnormal   [x] Mouth/Throat: Mucous membranes are moist.     External Ears [x] Normal  [] Abnormal-     Neck: [x] No visualized mass     Pulmonary/Chest: [x] Respiratory effort normal.  [] No visualized signs of difficulty breathing or respiratory distress        [x] Abnormal seems short of breath at rest, talks with pursed lips, same as before, on oxygen per nasal cannula at 3 L/min       Musculoskeletal:   [] Normal gait with no signs of ataxia         [x] Normal range of motion of neck        [x] Abnormal-significant decreased range of motion in the spine, has difficulty getting up and starting to walk, needs physical support, antalgic gait noted for a few steps.       Neurological:        [x] No Facial Asymmetry (Cranial nerve 7 motor function) (limited exam to video visit)          [x] No gaze palsy        [] Abnormal-            Skin:        [x] No significant exanthematous lesions or discoloration noted on facial skin         [] Abnormal-            Psychiatric:      [x] No Hallucinations       []Mood is normal      [x]Behavior is normal      [x]Judgment is normal      [x]Thought content is normal       [x] Abnormal-anxious  Other pertinent observable physical exam findings-none    Due to this being a TeleHealth encounter, evaluation of the following organ systems is limited: Vitals/Constitutional/EENT/Resp/CV/GI//MS/Neuro/Skin/Heme-Lymph-Imm. I personally reviewed most recent labs reviewed with the patient and all questions fully answered.    Anemia of chronic disease  Chronic kidney disease stage III stable  Hyperglycemia  Hyperlipidemia  High triglycerides    Otherwise labs within normal limits        Lab Results   Component Value Date    WBC 10.2 02/11/2022    HGB 10.7 (L) 02/11/2022    HCT 38.2 (L) 02/11/2022    MCV 91.6 02/11/2022     02/11/2022       Lab Results   Component Value Date     02/11/2022    K 4.3 02/11/2022    K 4.3 02/11/2022     02/11/2022    CO2 27 02/11/2022    BUN 23 02/11/2022    CREATININE 1.37 02/11/2022    GLUCOSE 156 02/11/2022    CALCIUM 9.4 02/11/2022        Lab Results   Component Value Date    ALT 11 02/10/2022    AST 14 02/10/2022    ALKPHOS 90 02/10/2022    BILITOT 0.33 02/10/2022       Lab Results   Component Value Date    TSH 2.26 01/20/2021       Lab Results   Component Value Date    CHOL 137 09/12/2021    CHOL 121 07/24/2021    CHOL 151 09/25/2020     Lab Results   Component Value Date    TRIG 416 (H) 09/12/2021    TRIG 213 (H) 07/24/2021    TRIG 180 (H) 09/25/2020     Lab Results   Component Value Date    HDL 30 (L) 09/12/2021    HDL 32 (L) 07/24/2021    HDL 33 (L) 09/25/2020     Lab Results   Component Value Date    LDLCHOLESTEROL      09/12/2021    LDLCHOLESTEROL 46 07/24/2021    LDLCHOLESTEROL 82 09/25/2020       Lab Results   Component Value Date    CHOLHDLRATIO 4.6 09/12/2021    CHOLHDLRATIO 3.8 07/24/2021    CHOLHDLRATIO 4.6 09/25/2020       Lab Results   Component Value Date    LABA1C 7.7 (H) 10/23/2021    LABA1C 7.6 07/20/2021    LABA1C 8.0 04/23/2021         Lab Results   Component Value Date    [id number] 1096 07/24/2021       Lab Results Component Value Date    VITD25 38.9 2021       Orders Placed This Encounter   Medications    naloxone 4 MG/0.1ML LIQD nasal spray     Si spray by Nasal route as needed for Opioid Reversal Patient needs counseling     Dispense:  1 each     Refill:  3       Orders Placed This Encounter   Procedures    Basic Metabolic Panel     Standing Status:   Future     Standing Expiration Date:   2023    Brain Natriuretic Peptide     Standing Status:   Future     Standing Expiration Date:   2023    Magnesium     Standing Status:   Future     Standing Expiration Date:   2023    Phosphorus     Standing Status:   Future     Standing Expiration Date:   2023    TSH     Standing Status:   Future     Standing Expiration Date:   2023    Vitamin D 25 Hydroxy     Standing Status:   Future     Standing Expiration Date:   2023    Vitamin B12 & Folate     Standing Status:   Future     Standing Expiration Date:   2022       There are no discontinued medications. Frederick Escamillaswick., was evaluated through a synchronous (real-time) audio-video encounter. The patient (or guardian if applicable) is aware that this is a billable service, which includes applicable co-pays. The virtual visit was conducted with patient's (and/or legal guardian consent). Verbal consent to proceed has been obtained within the past 12 months. The visit was conducted pursuant to the emergency declaration under the [address] [place] [address], [address] [place] [address] authority and the [employer] and [employer] General Act. Patient identification was verified    Patient was accompanied by his wife Mert Reece and his daughter, Autumn Helms. Provider was located at primary practice location. The patient was located at home, in a state where the provider was licensed to provide care.     On this date 2022 I have spent 39 minutes reviewing previous notes, test results and face to face with the patient discussing the diagnosis and importance of compliance with the treatment plan as well as documenting on the day of the visit, and care coordination which is wife, Nicholas Sin, and to complete his form. --Violette Crane MD on 2/27/2022 at 8:13 PM    An electronic signature was used to authenticate this note.

## 2022-02-27 RX ORDER — NALOXONE HYDROCHLORIDE 4 MG/.1ML
1 SPRAY NASAL PRN
Qty: 1 EACH | Refills: 3 | Status: SHIPPED | OUTPATIENT
Start: 2022-02-27

## 2022-02-28 ENCOUNTER — HOSPITAL ENCOUNTER (OUTPATIENT)
Age: 57
Discharge: HOME | End: 2022-02-28
Payer: COMMERCIAL

## 2022-02-28 DIAGNOSIS — Z79.4 TYPE 2 DIABETES MELLITUS WITH DIABETIC POLYNEUROPATHY, WITH LONG-TERM CURRENT USE OF INSULIN (HCC): ICD-10-CM

## 2022-02-28 DIAGNOSIS — E11.42 TYPE 2 DIABETES MELLITUS WITH DIABETIC POLYNEUROPATHY, WITH LONG-TERM CURRENT USE OF INSULIN (HCC): ICD-10-CM

## 2022-02-28 DIAGNOSIS — I13.0 BENIGN HYPERTENSIVE HEART AND CKD, STAGE 3 (GFR 30-59), W CHF (HCC): ICD-10-CM

## 2022-02-28 DIAGNOSIS — N18.30 BENIGN HYPERTENSIVE HEART AND CKD, STAGE 3 (GFR 30-59), W CHF (HCC): ICD-10-CM

## 2022-02-28 LAB
ANION GAP SERPL CALCULATED.3IONS-SCNC: 13 MMOL/L (ref 9–17)
BUN BLDV-MCNC: 29 MG/DL (ref 6–20)
CALCIUM SERPL-MCNC: 9.5 MG/DL (ref 8.6–10.4)
CHLORIDE BLD-SCNC: 100 MMOL/L (ref 98–107)
CO2: 30 MMOL/L (ref 20–31)
CREAT SERPL-MCNC: 1.57 MG/DL (ref 0.7–1.2)
FOLATE: 13.8 NG/ML
GFR AFRICAN AMERICAN: 55 ML/MIN
GFR NON-AFRICAN AMERICAN: 46 ML/MIN
GFR SERPL CREATININE-BSD FRML MDRD: ABNORMAL ML/MIN/{1.73_M2}
GLUCOSE BLD-MCNC: 132 MG/DL (ref 70–99)
MAGNESIUM: 2 MG/DL (ref 1.6–2.6)
PHOSPHORUS: 4.3 MG/DL (ref 2.5–4.5)
POTASSIUM SERPL-SCNC: 4.3 MMOL/L (ref 3.7–5.3)
PRO-BNP: 136 PG/ML
SODIUM BLD-SCNC: 143 MMOL/L (ref 135–144)
TSH SERPL DL<=0.05 MIU/L-ACNC: 0.4 MIU/L (ref 0.3–5)
VITAMIN B-12: 1006 PG/ML (ref 232–1245)
VITAMIN D 25-HYDROXY: 38.7 NG/ML (ref 30–100)

## 2022-02-28 PROCEDURE — 82306 VITAMIN D 25 HYDROXY: CPT

## 2022-02-28 PROCEDURE — 82607 VITAMIN B-12: CPT

## 2022-02-28 PROCEDURE — 84443 ASSAY THYROID STIM HORMONE: CPT

## 2022-02-28 PROCEDURE — 80048 BASIC METABOLIC PNL TOTAL CA: CPT

## 2022-02-28 PROCEDURE — 83735 ASSAY OF MAGNESIUM: CPT

## 2022-02-28 PROCEDURE — 83880 ASSAY OF NATRIURETIC PEPTIDE: CPT

## 2022-02-28 PROCEDURE — 82746 ASSAY OF FOLIC ACID SERUM: CPT

## 2022-02-28 PROCEDURE — 84100 ASSAY OF PHOSPHORUS: CPT

## 2022-02-28 PROCEDURE — 36415 COLL VENOUS BLD VENIPUNCTURE: CPT

## 2022-02-28 SDOH — HEALTH STABILITY: PHYSICAL HEALTH: ON AVERAGE, HOW MANY MINUTES DO YOU ENGAGE IN EXERCISE AT THIS LEVEL?: 0 MIN

## 2022-02-28 SDOH — HEALTH STABILITY: PHYSICAL HEALTH: ON AVERAGE, HOW MANY DAYS PER WEEK DO YOU ENGAGE IN MODERATE TO STRENUOUS EXERCISE (LIKE A BRISK WALK)?: 0 DAYS

## 2022-02-28 ASSESSMENT — PATIENT HEALTH QUESTIONNAIRE - PHQ9
SUM OF ALL RESPONSES TO PHQ QUESTIONS 1-9: 10
SUM OF ALL RESPONSES TO PHQ QUESTIONS 1-9: 9
10. IF YOU CHECKED OFF ANY PROBLEMS, HOW DIFFICULT HAVE THESE PROBLEMS MADE IT FOR YOU TO DO YOUR WORK, TAKE CARE OF THINGS AT HOME, OR GET ALONG WITH OTHER PEOPLE: 1
7. TROUBLE CONCENTRATING ON THINGS, SUCH AS READING THE NEWSPAPER OR WATCHING TELEVISION: 0
6. FEELING BAD ABOUT YOURSELF - OR THAT YOU ARE A FAILURE OR HAVE LET YOURSELF OR YOUR FAMILY DOWN: 1
3. TROUBLE FALLING OR STAYING ASLEEP: 1
9. THOUGHTS THAT YOU WOULD BE BETTER OFF DEAD, OR OF HURTING YOURSELF: 1
SUM OF ALL RESPONSES TO PHQ9 QUESTIONS 1 & 2: 5
SUM OF ALL RESPONSES TO PHQ QUESTIONS 1-9: 10
5. POOR APPETITE OR OVEREATING: 1
4. FEELING TIRED OR HAVING LITTLE ENERGY: 1
2. FEELING DOWN, DEPRESSED OR HOPELESS: 3
SUM OF ALL RESPONSES TO PHQ QUESTIONS 1-9: 10
1. LITTLE INTEREST OR PLEASURE IN DOING THINGS: 2
8. MOVING OR SPEAKING SO SLOWLY THAT OTHER PEOPLE COULD HAVE NOTICED. OR THE OPPOSITE, BEING SO FIGETY OR RESTLESS THAT YOU HAVE BEEN MOVING AROUND A LOT MORE THAN USUAL: 0

## 2022-02-28 ASSESSMENT — LIFESTYLE VARIABLES
HOW OFTEN DO YOU HAVE A DRINK CONTAINING ALCOHOL: NEVER
HOW MANY STANDARD DRINKS CONTAINING ALCOHOL DO YOU HAVE ON A TYPICAL DAY: 98
HOW MANY STANDARD DRINKS CONTAINING ALCOHOL DO YOU HAVE ON A TYPICAL DAY: PATIENT DECLINED
HOW OFTEN DO YOU HAVE A DRINK CONTAINING ALCOHOL: 1
HOW OFTEN DO YOU HAVE SIX OR MORE DRINKS ON ONE OCCASION: 1

## 2022-02-28 NOTE — RESULT ENCOUNTER NOTE
Please notify patient: Blood glucose well controlled 132. Chronic kidney disease stage III slightly worsening from before  If he has any issues with retention of urine, to make sure he addresses it with the urologist on 3/22/2022, alternatively until then to go to urinate in 2 steps , because it retention of the urine can cause worsening kidney disease.         Otherwise labs within normal limits  continue current treatment    Future Appointments  3/2/2022   8:30 AM    [facility] Paez [facility]     [facility]  3/2/2022   9:30 AM    ST MEDICATION MGMT       [facility] MED MGMT        [address]  3/2/2022   11:30 AM   [facility] CHF CLINIC  [address]  3/17/2022  8:00 AM    Adam Villalta MD     [facility]        [facility]  3/22/2022  10:00 AM   Alanis Jasso MD        [address] [employer]

## 2022-03-02 ENCOUNTER — HOSPITAL ENCOUNTER (OUTPATIENT)
Dept: PHARMACY | Age: 57
Setting detail: THERAPIES SERIES
Discharge: HOME | End: 2022-03-02
Payer: COMMERCIAL

## 2022-03-02 ENCOUNTER — HOSPITAL ENCOUNTER (OUTPATIENT)
Dept: OTHER | Age: 57
Discharge: HOME | End: 2022-03-02
Payer: COMMERCIAL

## 2022-03-02 ENCOUNTER — HOSPITAL ENCOUNTER (OUTPATIENT)
Age: 57
Discharge: HOME | End: 2022-03-02
Payer: COMMERCIAL

## 2022-03-02 ENCOUNTER — OFFICE VISIT (OUTPATIENT)
Dept: FAMILY MEDICINE CLINIC | Age: 57
End: 2022-03-02
Payer: COMMERCIAL

## 2022-03-02 VITALS
HEART RATE: 101 BPM | HEIGHT: 72 IN | TEMPERATURE: 97.8 F | OXYGEN SATURATION: 92 % | DIASTOLIC BLOOD PRESSURE: 52 MMHG | SYSTOLIC BLOOD PRESSURE: 140 MMHG | WEIGHT: 315 LBS | BODY MASS INDEX: 42.66 KG/M2

## 2022-03-02 VITALS
WEIGHT: 315 LBS | SYSTOLIC BLOOD PRESSURE: 128 MMHG | BODY MASS INDEX: 42.66 KG/M2 | OXYGEN SATURATION: 93 % | HEIGHT: 72 IN | HEART RATE: 89 BPM | DIASTOLIC BLOOD PRESSURE: 70 MMHG | RESPIRATION RATE: 22 BRPM

## 2022-03-02 DIAGNOSIS — M51.36 DDD (DEGENERATIVE DISC DISEASE), LUMBAR: ICD-10-CM

## 2022-03-02 DIAGNOSIS — G89.29 CHRONIC MIDLINE LOW BACK PAIN WITH LEFT-SIDED SCIATICA: ICD-10-CM

## 2022-03-02 DIAGNOSIS — M48.061 SPINAL STENOSIS OF LUMBAR REGION WITHOUT NEUROGENIC CLAUDICATION: ICD-10-CM

## 2022-03-02 DIAGNOSIS — F33.2 MDD (MAJOR DEPRESSIVE DISORDER), RECURRENT SEVERE, WITHOUT PSYCHOSIS (HCC): ICD-10-CM

## 2022-03-02 DIAGNOSIS — F41.9 ANXIETY: ICD-10-CM

## 2022-03-02 DIAGNOSIS — M54.42 CHRONIC MIDLINE LOW BACK PAIN WITH LEFT-SIDED SCIATICA: ICD-10-CM

## 2022-03-02 DIAGNOSIS — Z00.00 MEDICARE ANNUAL WELLNESS VISIT, SUBSEQUENT: Primary | ICD-10-CM

## 2022-03-02 DIAGNOSIS — E55.9 VITAMIN D DEFICIENCY: ICD-10-CM

## 2022-03-02 DIAGNOSIS — L73.2 HIDRADENITIS SUPPURATIVA OF LEFT AXILLA: ICD-10-CM

## 2022-03-02 DIAGNOSIS — I50.32 CHRONIC DIASTOLIC CONGESTIVE HEART FAILURE (HCC): ICD-10-CM

## 2022-03-02 DIAGNOSIS — M96.1 POSTLAMINECTOMY SYNDROME OF LUMBAR REGION: ICD-10-CM

## 2022-03-02 LAB
ANION GAP SERPL CALCULATED.3IONS-SCNC: 16 MMOL/L (ref 9–17)
BUN BLDV-MCNC: 30 MG/DL (ref 6–20)
CALCIUM SERPL-MCNC: 9.3 MG/DL (ref 8.6–10.4)
CHLORIDE BLD-SCNC: 98 MMOL/L (ref 98–107)
CO2: 27 MMOL/L (ref 20–31)
CREAT SERPL-MCNC: 1.46 MG/DL (ref 0.7–1.2)
CREATININE URINE: 31.6 MG/DL (ref 39–259)
GFR AFRICAN AMERICAN: >60 ML/MIN
GFR NON-AFRICAN AMERICAN: 50 ML/MIN
GFR SERPL CREATININE-BSD FRML MDRD: ABNORMAL ML/MIN/{1.73_M2}
GLUCOSE BLD-MCNC: 217 MG/DL (ref 70–99)
MICROALBUMIN/CREAT 24H UR: <12 MG/L
MICROALBUMIN/CREAT UR-RTO: ABNORMAL MCG/MG CREAT
POTASSIUM SERPL-SCNC: 4.3 MMOL/L (ref 3.7–5.3)
PRO-BNP: 177 PG/ML
SODIUM BLD-SCNC: 141 MMOL/L (ref 135–144)

## 2022-03-02 PROCEDURE — 36415 COLL VENOUS BLD VENIPUNCTURE: CPT

## 2022-03-02 PROCEDURE — 99212 OFFICE O/P EST SF 10 MIN: CPT

## 2022-03-02 PROCEDURE — 82043 UR ALBUMIN QUANTITATIVE: CPT

## 2022-03-02 PROCEDURE — 83880 ASSAY OF NATRIURETIC PEPTIDE: CPT

## 2022-03-02 PROCEDURE — 99211 OFF/OP EST MAY X REQ PHY/QHP: CPT

## 2022-03-02 PROCEDURE — G8482 FLU IMMUNIZE ORDER/ADMIN: HCPCS | Performed by: FAMILY MEDICINE

## 2022-03-02 PROCEDURE — G0439 PPPS, SUBSEQ VISIT: HCPCS | Performed by: FAMILY MEDICINE

## 2022-03-02 PROCEDURE — 3017F COLORECTAL CA SCREEN DOC REV: CPT | Performed by: FAMILY MEDICINE

## 2022-03-02 PROCEDURE — 80048 BASIC METABOLIC PNL TOTAL CA: CPT

## 2022-03-02 PROCEDURE — 82570 ASSAY OF URINE CREATININE: CPT

## 2022-03-02 RX ORDER — MELATONIN
1000 DAILY
Qty: 90 TABLET | Refills: 1 | Status: SHIPPED | OUTPATIENT
Start: 2022-03-02 | End: 2022-09-06

## 2022-03-02 RX ORDER — NYSTATIN 100000 [USP'U]/G
POWDER TOPICAL
Qty: 60 G | Refills: 3 | Status: SHIPPED | OUTPATIENT
Start: 2022-03-02 | End: 2022-10-23 | Stop reason: SDUPTHER

## 2022-03-02 RX ORDER — VENLAFAXINE HYDROCHLORIDE 150 MG/1
150 CAPSULE, EXTENDED RELEASE ORAL EVERY MORNING
Qty: 90 CAPSULE | Refills: 2 | Status: SHIPPED | OUTPATIENT
Start: 2022-03-02

## 2022-03-02 RX ORDER — LISINOPRIL 2.5 MG/1
2.5 TABLET ORAL DAILY
Qty: 90 TABLET | Refills: 0 | Status: SHIPPED | OUTPATIENT
Start: 2022-03-02 | End: 2022-05-31 | Stop reason: SDUPTHER

## 2022-03-02 RX ORDER — OXYCODONE HYDROCHLORIDE 10 MG/1
10 TABLET ORAL EVERY 8 HOURS PRN
Qty: 90 TABLET | Refills: 0 | Status: SHIPPED | OUTPATIENT
Start: 2022-03-02 | End: 2022-03-31 | Stop reason: SDUPTHER

## 2022-03-02 RX ORDER — CEPHALEXIN 500 MG/1
500 CAPSULE ORAL 4 TIMES DAILY
Qty: 40 CAPSULE | Refills: 0 | Status: SHIPPED | OUTPATIENT
Start: 2022-03-02 | End: 2022-04-19 | Stop reason: ALTCHOICE

## 2022-03-02 RX ORDER — CHLORHEXIDINE GLUCONATE 4 G/100ML
SOLUTION TOPICAL
Qty: 946 ML | Refills: 3 | Status: SHIPPED | OUTPATIENT
Start: 2022-03-02

## 2022-03-02 ASSESSMENT — PATIENT HEALTH QUESTIONNAIRE - PHQ9
6. FEELING BAD ABOUT YOURSELF - OR THAT YOU ARE A FAILURE OR HAVE LET YOURSELF OR YOUR FAMILY DOWN: 3
SUM OF ALL RESPONSES TO PHQ QUESTIONS 1-9: 10
SUM OF ALL RESPONSES TO PHQ9 QUESTIONS 1 & 2: 4
1. LITTLE INTEREST OR PLEASURE IN DOING THINGS: 3
SUM OF ALL RESPONSES TO PHQ QUESTIONS 1-9: 10
4. FEELING TIRED OR HAVING LITTLE ENERGY: 3
SUM OF ALL RESPONSES TO PHQ QUESTIONS 1-9: 10
3. TROUBLE FALLING OR STAYING ASLEEP: 0
SUM OF ALL RESPONSES TO PHQ QUESTIONS 1-9: 10
8. MOVING OR SPEAKING SO SLOWLY THAT OTHER PEOPLE COULD HAVE NOTICED. OR THE OPPOSITE, BEING SO FIGETY OR RESTLESS THAT YOU HAVE BEEN MOVING AROUND A LOT MORE THAN USUAL: 0
9. THOUGHTS THAT YOU WOULD BE BETTER OFF DEAD, OR OF HURTING YOURSELF: 0
5. POOR APPETITE OR OVEREATING: 0
10. IF YOU CHECKED OFF ANY PROBLEMS, HOW DIFFICULT HAVE THESE PROBLEMS MADE IT FOR YOU TO DO YOUR WORK, TAKE CARE OF THINGS AT HOME, OR GET ALONG WITH OTHER PEOPLE: 1
2. FEELING DOWN, DEPRESSED OR HOPELESS: 1
7. TROUBLE CONCENTRATING ON THINGS, SUCH AS READING THE NEWSPAPER OR WATCHING TELEVISION: 0

## 2022-03-02 ASSESSMENT — COLUMBIA-SUICIDE SEVERITY RATING SCALE - C-SSRS
1. WITHIN THE PAST MONTH, HAVE YOU WISHED YOU WERE DEAD OR WISHED YOU COULD GO TO SLEEP AND NOT WAKE UP?: NO
6. HAVE YOU EVER DONE ANYTHING, STARTED TO DO ANYTHING, OR PREPARED TO DO ANYTHING TO END YOUR LIFE?: NO
2. HAVE YOU ACTUALLY HAD ANY THOUGHTS OF KILLING YOURSELF?: NO

## 2022-03-02 ASSESSMENT — LIFESTYLE VARIABLES: HOW OFTEN DO YOU HAVE A DRINK CONTAINING ALCOHOL: NEVER

## 2022-03-02 NOTE — PROGRESS NOTES
Medicare Annual Wellness Visit    Miguel De Souza. is here for Medicare Aury Efra Brantley [address] was seen today for medicare awv. Diagnoses and all orders for this visit:    Medicare annual wellness visit, subsequent    Vitamin D deficiency  Likely improving  -     vitamin D3 (CHOLECALCIFEROL) 25 MCG (1000 UT) TABS tablet; Take 1 tablet by mouth daily    Spinal stenosis of lumbar region without neurogenic claudication  Likely worsening due to wear and tear   Had 4 back surgeries   Pain is improved with medication  -     oxyCODONE HCl (OXY-IR) 10 MG immediate release tablet; Take 1 tablet by mouth every 8 hours as needed for Pain for up to 30 days. Gabapentin, Tizanidine , repositioning and stretching, heating pad  Referral to physical therapy placed  Chronic midline low back pain with left-sided sciatica  Pain is improved with medications, denies side effects  Continue gabapentin, oxycodone as needed, tizanidine, repositioning and stretching, heating pad  He does admit of still using marijuana for anxiety, advised to obtain the card for medical marijuana  -     oxyCODONE HCl (OXY-IR) 10 MG immediate release tablet; Take 1 tablet by mouth every 8 hours as needed for Pain for up to 30 days. Referral to physical therapy placed  DDD (degenerative disc disease), lumbar  Likely worsening due to wear and tear nature of the disease.   -     oxyCODONE HCl (OXY-IR) 10 MG immediate release tablet; Take 1 tablet by mouth every 8 hours as needed for Pain for up to 30 days. Referral to physical therapy placed  Postlaminectomy syndrome of lumbar region  -     oxyCODONE HCl (OXY-IR) 10 MG immediate release tablet; Take 1 tablet by mouth every 8 hours as needed for Pain for up to 30 days. Referral to physical therapy placed  Hidradenitis suppurativa of left axilla  Worsening  We will treat promptly the infection  -     nystatin (MYCOSTATIN) 127695 UNIT/GM powder;  Apply 2 times daily in the skin folds for long term.  -     cephALEXin (KEFLEX) 500 MG capsule; Take 1 capsule by mouth 4 times daily  -     chlorhexidine (HIBICLENS) 4 % external liquid; Use once or twice a day to clean the left armpit, diluted in water and wash the left armpit with it    Chronic diastolic congestive heart failure (HCC)  Worsening  Lab Results   Component Value Date    LVEF 46 03/26/2021    LVEFMODE Echo 08/29/2017   Ejection fraction has been decreasing    -   Needs to restart smaller dosage of lisinopril (PRINIVIL;ZESTRIL) 2.5 MG tablet; Take 1 tablet by mouth daily Dose decreased 3/2/2022  Only Aldactone was stopped while in the hospital in October 2021  Continue metoprolol 50 mg twice a day, Bumex 2 mg twice a day and follow-up with cardiologist and congestive heart failure clinic      MDD (major depressive disorder), recurrent severe, without psychosis ([facility] [address]  Stable but not improving  We will continue to monitor    -     venlafaxine (EFFEXOR XR) 150 MG extended release capsule; Take 1 capsule by mouth every morning Dose increased 3/2/2022  Patient has good family support, comes with family members at the appointment, his wife has [place] to care for him at home  Anxiety  Worsening  -     venlafaxine (EFFEXOR XR) 150 MG extended release capsule; Take 1 capsule by mouth every morning Dose increased 3/2/2022  He does admit of still using marijuana for anxiety, advised to obtain the card for medical marijuana            Has appointment with Dr. Mc Anderson in April, will call [facility] for portable oxygen, he will discuss this with his pulmonologist.    SpO2 Readings from Last 4 Encounters:   03/02/22 92%   02/10/22 96%   11/24/21 97%   11/01/21 90%         Controlled Substance Monitoring:    Acute and Chronic Pain Monitoring:   RX Monitoring 3/2/2022   Attestation -   Periodic Controlled Substance Monitoring Possible medication side effects, risk of tolerance/dependence & alternative treatments discussed. ;No signs of potential drug abuse or diversion identified. ;Assessed functional status. ;Obtaining appropriate analgesic effect of treatment. Chronic Pain > 50 MEDD -   Chronic Pain > 80 MEDD -           Future Appointments   Date Time Provider William Hernandez   3/2/2022 11:30 AM [facility] CHF CLINIC RM [address]   3/17/2022  8:00 AM Nancy Mitchell MD [facility]   3/22/2022 10:00 AM Kenneth Pichardo MD . C [facility]   3/7/2023  9:00 AM Jesus Manuel Quinonez MD  sc [facility]       Recommendations for Preventive Services Due: see orders and patient instructions/AVS.  Recommended screening schedule for the next 5-10 years is provided to the patient in written form: see Patient Instructions/AVS.     Return in 1 year (on 3/2/2023) for Medicare Annual Wellness Visit in 1 year. Subjective        Comes with his daughter, Nick Cade. Patient has chronic low back pain, midline and shooting down to the left side, has known spinal stenosis, degenerative changes, history of 4 back surgeries. Intensity of pain is 8/10, worse today due to coming to the appointment per his report. He admits he restarted to use THC edibles as \"so many complications\" and medical conditions have been overwhelming for him, making the anxiety worse, will get the card he says. His daughter will help him. He was started on gabapentin per neurology for chronic pain and neuropathy. Once a month falling  Declines PT, but agrees finally. Has Wheelchair, walker, cane, shower chair , oxygen machine      Doesn't have Portable oxygen, he is on continuous 3 l/min  [facility]    Reports worsening hidradenitis with left armpit , flaring up, sometimes drainage and squeezing them, pus comes out, for the past week. He denies fever, chills, night sweats. He says he has been using the nystatin powder and chlorhexidine for the skin, to prevent the flareups.       Diabetes, seeing Dr. Neol Ritchie and clinical pharmacist  Home Blood Glucose 138, 216, still fluctuating a lot  A1c worsening  Lab Results   Component Value Date    LABA1C 8.8 02/24/2022    LABA1C 8.8 02/24/2022    LABA1C 7.7 (H) 10/23/2021     CHF  Has been seeing cardiology, apparently he did not restart lisinopril, only taking Bumex  He says he will restart going to the congestive heart failure clinic as well  Ejection fraction has been worsening  Lab Results   Component Value Date    LVEF 46 03/26/2021    LVEFMODE Echo 08/29/2017   EF was 50 to 55% on 3/25/2021      Depression and anxiety  Taking Effexor has been helping, he is agreeable to increase the dosage. Has been having help from his daughters and his wife, who also has FMLA to help him with appointments and ADLs. He declines referral to counseling, he did counseling before and did not help      PHQ-2 Over the past 2 weeks, how often have you been bothered by any of the following problems? Little interest or pleasure in doing things: Nearly every day  Feeling down, depressed, or hopeless: Several days  PHQ-2 Score: 4  PHQ-9 Over the past 2 weeks, how often have you been bothered by any of the following problems? Trouble falling or staying asleep, or sleeping too much: Not at all  Feeling tired or having little energy: Nearly every day  Poor appetite or overeating: Not at all  Feeling bad about yourself - or that you are a failure or have let yourself or your family down: Nearly every day  Trouble concentrating on things, such as reading the newspaper or watching television: Not at all  Moving or speaking so slowly that other people could have noticed.  Or the opposite - being so fidgety or restless that you have been moving around a lot more than usual: Not at all  Thoughts that you would be better off dead, or of hurting yourself in some way: Not at all  If you checked off any problems, how difficult have these problems made it for you to do your work, take care of things at home, or get along with other people?: Somewhat difficult  PHQ-9 Total Score: 10  PHQ-9 Total Score: 10    PHQ Scores 3/2/2022 2/28/2022 2/23/2022 12/15/2021 7/20/2021 4/23/2021 2/23/2021   PHQ2 Score 4 5 0 0 0 6 0   PHQ9 Score 10 10 0 0 0 13 0       Patient's complete Health Risk Assessment and screening values have been reviewed and are found in Flowsheets. The following problems were reviewed today and where indicated follow up appointments were made and/or referrals ordered.     Positive Risk Factor Screenings with Interventions:    Fall Risk:  Do you feel unsteady or are you worried about falling? : (!) yes  2 or more falls in past year?: (!) yes  Fall with injury in past year?: (!) yes     Fall Risk Interventions:    · Home safety tips provided  · Home exercises provided to promote strength and balance  · Physical therapy referral ordered for strength and balance training   Has Wheelchair, walker, cane, shower chair      Depression:  PHQ-2 Score: 4  PHQ-9 Total Score: 10    Severity:1-4 = minimal depression, 5-9 = mild depression, 10-14 = moderate depression, 15-19 = moderately severe depression, 20-27 = severe depression    Depression Interventions:  · Medication prescribed- increased dosage of Effexor, declines counseling, has good family support     Substance Abuse - Tobacco Interventions: He has already quit smoking    He admits he restarted to use THC Gummies, I advised him against THC, patient says he has been having worsening anxiety and he is not willing to stop, advised that he will need to get marijuana card through [employer] marijuana [place].gov, his daughter will help him, otherwise I cannot continue to refill his opiates    Drug Use Screening:DAST-10 Score: 0  DAST-10 Score Interpretation:  1-2: Low level - Monitor, re-assess at a later date; 3-5: Moderate level - Further Investigation; 6-8: Substantial level - Intensive Assessment; 9-10: Severe level - Intensive Assessment    Substance Abuse - Drug Use Interventions:  Using [facility]    educational materials provided, patient is not ready to change his/her recreational drug use behavior at this time            Opioid Risk: (High risk score ?55) Opioid risk score: 66    Last PDMP Lionel as Reviewed:  Review User Review Instant Review Result   Shreya [address] 3/2/2022  9:31 AM Reviewed PDMP [1]     Last Controlled Substance Monitoring Documentation      Office Visit from 3/2/2022 in [facility]   Periodic Controlled Substance Monitoring Possible medication side effects, risk of tolerance/dependence & alternative treatments discussed., No signs of potential drug abuse or diversion identified. , Assessed functional status., Obtaining appropriate analgesic effect of treatment.  filed at 03/02/2022 [address] and ACP:  General  In general, how would you say your health is?: (!) Poor  In the past 7 days, have you experienced any of the following: New or Increased Pain, New or Increased Fatigue, Loneliness, Social Isolation, Stress or Anger?: No  Select all that apply: (!) Loneliness  Do you get the social and emotional support that you need?: (!) No  Do you have a Living Will?: Yes    Advance Directives     Power of  Living Will ACP-Advance Directive ACP-Power of Siddharth Sanchez on 07/20/21 Not on File Not on File Filed      General Health Risk Interventions:  · Poor self-assessment of health status: Patient has multiple medical conditions, has made appointment with his multiple specialists,  · Loneliness: patient declines any further intervention for this issue, His daughter says sometimes he self isolates in his own room  · Social isolation: patient's comments regarding inadequate social support: Patient reports sometimes he self isolates, his daughter says that sometimes he gets frustrated and prefers to stay by himself, patient declines any further intervention for this issue     Health Habits/Nutrition:     Physical Activity: Inactive    Days of Exercise per Week: 0 days    Minutes of Exercise per Session: 0 min     Have you lost any weight without trying in the past 3 months?: No  Body mass index: (!) 56.01  Have you seen the dentist within the past year?: (!) No    Health Habits/Nutrition Interventions:  · Nutritional issues:  educational materials for healthy, well-balanced diet provided, Diabetic diet discussed  · Dental exam overdue:  patient declines dental evaluation  · Morbid obesity per BMI, will refer to physical therapy he is agreeable      ADLs:  In the past 7 days, did you need help from others to perform any of the following everyday activities: Eating, dressing, grooming, bathing, toileting, or walking/balance?: No  In the past 7 days, did you need help from others to take care of any of the following: Laundry, housekeeping, banking/finances, shopping, telephone use, food preparation, transportation, or taking medications?: (!) Yes (all but the medications)  Select all that apply: (!) Laundry,Housekeeping,Banking/Finances,Shopping,Telephone Use,Food Preparation,Transportation    ADL Interventions:  · Patient declines any further evaluation/treatment for this issue  · Family members his daughters and his wife have been helping him          Objective   Vitals:    03/02/22 0827 03/02/22 0915   BP: (!) 150/66 (!) 140/52   Pulse: 101    Temp: 97.8 °F (36.6 °C)    SpO2: 92%    Weight: (!) 413 lb (187.3 kg)    Height: 6' (1.829 m)       Body mass index is 56.01 kg/m².       Vital signs within normal limits except mild tachycardia, hypoxia which got better with giving him oxygen which we had in the office, morbid obesity, and hypertension    General Appearance: alert and oriented to person, place and time, well-developed and well-nourished, in no acute distress, morbidly obese    ENT: hearing abnormal- has hearing aids in    Pulmonary/Chest: decreased breath sounds noted- bilateral    Cardiovascular: normal rate, normal S1 and S2 and distant heart sounds    Abdomen: soft, non-tender, non-distended, normal bowel sounds, no masses or organomegaly and obese    Extremities: no leg edema, bilateral stasis dermatitis   Left armpit erythematous rash, swelling, scarring noted, indurated red warm rash with some nodular lesions, consistent with flareup of hidradenitis    In wheelchair  Has difficulty getting up and walking, needs to hold onto physical support   Lumbar spine tenderness midline and on the sides, decreased range of motion antalgic, cannot bend over almost at all. Straight leg test positive on the left side. Old surgical scars noted number. Leroy Hoyles Up and go test more than 12 seconds. High risk for falls. Allergies   Allergen Reactions    Levofloxacin Anaphylaxis     Patient reports needing epinephrine \"about 5 or 6 months ago\" for anaphylaxis (itching, hives, SOB/swelling) after receiving Levofloxacin. Previous report from 08/20/2012: Nausea/Vomiting    Lorazepam      Falls      Nsaids      CHF&CKD    Prozac [Fluoxetine Hcl] Other (See Comments)     Pt started with seizures after started taking.  Vancomycin Other (See Comments)     Itching, SOB, emesis upon infusion in ED 6/12/2019. Patient states he has had vancomycin \"a number of times\" before without issue. couldn't breath and talk, throat closed     Prior to Visit Medications    Medication Sig Taking? Authorizing Provider   vitamin D3 (CHOLECALCIFEROL) 25 MCG (1000 UT) TABS tablet Take 1 tablet by mouth daily Yes Lizy Gonzalez MD   oxyCODONE HCl (OXY-IR) 10 MG immediate release tablet Take 1 tablet by mouth every 8 hours as needed for Pain for up to 30 days. Yes Lizy Gonzalez MD   nystatin (MYCOSTATIN) 378019 UNIT/GM powder Apply 2 times daily in the skin folds for long term.  Yes Lizy Gonzalez MD   venlafaxine (EFFEXOR XR) 150 MG extended release capsule Take 1 capsule by mouth every morning Dose increased 3/2/2022 Yes Lizy Gonzalez MD   magnesium oxide (MAG-OX) 400 MG tablet  Yes Historical Provider, MD   gabapentin (NEURONTIN) 300 MG capsule TAKE ONE CAPSULE BY MOUTH THREE TIMES PER DAY Yes Emily Beck MD   butalbital-acetaminophen-caffeine (FIORICET, ESGIC) -88 MG per tablet Take 1 tablet by mouth every 8 hours as needed for Headaches Yes Emily Beck MD   MAGNESIUM-OXIDE 400 (241.3 Mg) MG TABS tablet Take 1 tablet by mouth 2 times daily Frequency increased to 10/20/2022 Yes Noman Shaffer MD   pantoprazole (PROTONIX) 40 MG tablet Take 1 tablet by mouth every morning (before breakfast) Yes Noman Shaffer MD   nystatin (MYCOSTATIN) 123322 UNIT/ML suspension Take 5 mLs by mouth 4 times daily Swish and swallow Yes Noman Shaffer MD   albuterol (PROVENTIL) (2.5 MG/3ML) 0.083% nebulizer solution USE THREE MILLILITERS VIA NEBULIZATION BY MOUTH EVERY 6 HOURS AS NEEDED FOR WHEEZING OR FOR SHORTNESS OF BREATH Yes Noman Shaffer MD   tiZANidine (ZANAFLEX) 4 MG tablet Take 1 tablet by mouth every 8 hours as needed (Muscle spasms) Yes Noman Shaffer MD   Blood Pressure KIT Diagnosis: HTN. Needs to check blood pressure 1-2 times a day until stable, then once a day. Goal blood pressure less than 135/85, and above 110/60. Yes Noman Shaffer MD   allopurinol (ZYLOPRIM) 300 MG tablet Take 1 tablet by mouth daily Dose increased 12/9/2021 .   Stop if any rash develops Yes Noman Shaffer MD   FEROSUL 325 (65 Fe) MG tablet TAKE ONE TABLET BY MOUTH DAILY Yes Noman Shaffer MD   SPIRIVA RESPIMAT 2.5 MCG/ACT AERS inhaler INHALE TWO PUFFS BY MOUTH DAILY Yes Noman Shaffer MD   ADVAIR -21 MCG/ACT inhaler INHALE TWO PUFFS BY MOUTH TWICE A DAY Yes Noman Shaffer MD   metoprolol tartrate (LOPRESSOR) 50 MG tablet Take 1 tablet by mouth 2 times daily Yes Noman Shaffer MD   MUCUS RELIEF 600 MG extended release tablet  Yes Stewart Henriquez MD   amitriptyline (ELAVIL) 50 MG tablet Take 1 po qhs Yes Emily Beck MD   blood glucose monitor strips Test 3 times a day & as needed for symptoms of irregular blood glucose. Dispense sufficient amount for indicated testing frequency plus additional to accommodate PRN testing needs. One touch Ultra blue Yes Lena Desir MD   bumetanide (BUMEX) 1 MG tablet Take 1 tablet by mouth 2 times daily Dose decreased 10/23/2021  Patient taking differently: Take 2 mg by mouth 2 times daily Dose decreased 10/23/2021 Yes Lena Desir MD   ammonium lactate (LAC-HYDRIN) 12 % lotion Apply topically daily. Yes Lena Desir MD   ROCKLATAN 0.02-0.005 % SOLN  Yes Historical Provider, MD   Insulin Syringe-Needle U-100 31G X 5/16\" 1 ML MISC Use to subcutaneously inject insulin three times daily Yes Lena Desir MD   docusate sodium (COLACE) 100 MG capsule Take 1 capsule by mouth 2 times daily For constipation Yes Lena Desir MD   latanoprost (XALATAN) 0.005 % ophthalmic solution 1 drop nightly Yes Historical Provider, MD   clopidogrel (PLAVIX) 75 MG tablet Take 1 tablet by mouth daily Yes Lena Desir MD   nitroGLYCERIN (NITROSTAT) 0.4 MG SL tablet DISSOLVE 1 TAB UNDER TONGUE FOR CHEST PAIN - IF PAIN REMAINS AFTER 5 MIN, CALL 911 AND REPEAT DOSE. MAX 3 TABS IN 15 MINUTES Yes Lena Desir MD   insulin regular human (HUMULIN R) 500 UNIT/ML concentrated injection vial Patient using 0.52ml with breakfast, 0.52ml with lunch and 0.45ml with dinner. Patient taking differently: Patient using 0.60ml with breakfast, 0.52ml with lunch and 0.25 ml with dinner. Yes Lena Desir MD   rosuvastatin (CRESTOR) 10 MG tablet Take 1 tablet by mouth nightly Stop pravastatin Yes Lena Desir MD   Continuous Blood Gluc Sensor (FREESTYLE CRISTINE 2 SENSOR) [facility] [place] to apply a new sensor every 14 days. RX to cover voucher patient will bring in.  Yes Lena Desir MD   PROAIR  (90 Base) MCG/ACT inhaler INHALE TWO PUFFS BY MOUTH EVERY 6 HOURS AS NEEDED FOR WHEEZING OR FOR SHORTNESS OF BREATH Yes Maria Isabel Freedman MD   clobetasol (TEMOVATE) 0.05 % ointment Apply topically 2 times daily for psoriasis Yes Maria Isabel Freedman MD   ketoconazole (NIZORAL) 2 % cream Apply twice a day for yeast infection in the skin folds, for 4 weeks Yes Maria Isabel Freedman MD   Lancets MISC Use to check blood sugar three times daily along with when necessary due to symptoms. Yes Rona Brand MD   vitamin B-12 (CYANOCOBALAMIN) 500 MCG tablet Take 1 tablet by mouth daily Yes Maria Isabel Freedman MD   Oxygen Tubing MISC by Does not apply route DX COPD. chronic respiratory failure Yes Maria Isabel Freedman MD   Respiratory Therapy Supplies (NEBULIZER/TUBING/MOUTHPIECE) KIT Dx COPD needs nebulizer supplies Yes Maria Isabel Freedman MD   ONE TOUCH ULTRASOFT LANCETS [facility] Patient to test blood sugar up to 4 times daily. Yes Rona Brand MD   Lancet Devices (LANCING DEVICE) MISC Provide patient with lancing device appropriate for his machine/lancing needles. Yes Maria Isabel Freedman MD   Handicap Placard MISC by Does not apply route Can't walk greater than 200 feet. Expires in 5 years. Yes Maria Isabel Freedman MD   fluticasone (CUTIVATE) 0.05 % cream Apply topically 2 times daily  Yes Historical Provider, MD   fluticasone (FLONASE) 50 MCG/ACT nasal spray 2 sprays by Nasal route daily  Patient taking differently: 2 sprays by Nasal route daily as needed (sinus symptoms)  Yes Paige Bryant MD   Melatonin 10 MG TABS Take 10 mg by mouth nightly as needed (insomnia) Yes Maria Isabel Freedman MD   aspirin 81 MG EC tablet Take 81 mg by mouth daily.  Yes Historical Provider, MD   naloxone 4 MG/0.1ML LIQD nasal spray 1 spray by Nasal route as needed for Opioid Reversal Patient needs counseling  Maria Isabel Freedman MD   epoetin leslie-epbx (RETACRIT) 3000 UNIT/ML SOLN injection Inject 1 mL into the skin three times a week  Patient not taking: Reported on 3/2/2022  Kia Rosario MD   Gauze Pads & Dressings 4\"X4\" PADS Twice a day dressing of right leg wound  Patient not taking: Reported on 3/2/2022  Mirta Syed MD   Gauze Bandages (ROLLED GAUZE BANDAGE 4\"X2. 5YD) MISC For twice a day dressing  Patient not taking: Reported on 3/2/2022  Mirta Syed MD       CareTeam (Including outside providers/suppliers regularly involved in providing care):   Patient Care Team:  Mirta Syed MD as PCP - General (Family Medicine)  Mirta Syed MD as PCP - [facility]  [place] MD Amarjit as Consulting Physician (Endocrinology)  Ame Madrid DO as Consulting Physician (Cardiology)  John Torres DPM as Surgeon (Podiatry)  Paola Joyce MD as Consulting Physician (Ophthalmology)  Tray Lima MD as Consulting Physician (Nephrology)  Marcie Medina MD as Consulting Physician (Pulmonology)  Aleah Arriaza MD as Surgeon (Vascular Surgery)  Arpit Willams MD as Consulting Physician (Gastroenterology)  Rob Pinto, [address] [place] [address] as Pharmacist (Pharmacist)  Lucy Davies MD as Consulting Physician (Pulmonology)  Tarun Posada MD as Consulting Physician (Urology)  Chandler Chambers MD as Surgeon (Ophthalmology)  Nayla Galan MD as Consulting Physician (Neurology)  Marcie Medina DO as Consulting Physician (Otolaryngology)  Arpit Willams MD as Consulting Physician (Gastroenterology)  NICOLE Castellanos - CNP as Nurse Practitioner (Otolaryngology)  Tray Lima MD as Consulting Physician (Nephrology)  Cordelia Carrasco MD as Consulting Physician (Oncology)    Reviewed and updated this visit:  Tobacco  Allergies  Meds  Problems  Med Hx  Surg Hx  Soc Hx  Fam Hx

## 2022-03-02 NOTE — PROGRESS NOTES
Diabetic Medication Management   [facility] [address]    [address] [facility] [address]. [place], [address] [facility]  Phone: [phone]  Fax: [phone]    NAME: Vahe Barnhart. MEDICAL RECORD NUMBER:  [id number]  AGE: 61 y.o. GENDER: male  : 1965  EPISODE DATE:  3/2/2022       Mr. Ander Marr was referred to [facility] Medication Management Services by Dr. Galina Alexandre, Special instructions include: titrate all medications (as defined in clinic's policy and procedure)    Patient seen in office. Goals per referral:   Fasting blood glucose: < 130  Peak postprandial glucose: < 180  A1C: < 7    Other goals:  Blood pressure goal: 130/80  Weight loss goal (~10%): Target weight  410 to be reached by date: 2022 (3-6 months)  Physical Activity goal:    Discussed but nothing formal at this time and no specific goals set  Smoking Cessation   Quit Date: Stopped 20. Cholesterol at target:   Date: Yes LDL 46  HDL 32 Trig 213 (2021)  Annual eye exam:    Date: 2022  Comprehensive annual foot exam:   Date:  2022  Annual urine Albumin and serum creatinine:   Date:  microalbumin <12 mg/L (21), SrCr 1.46 mg/dL (2/10/22) eGFR 52.89 ml/min        Subjective   Mr. Ander Marr is a 61 y.o. male here for the Diabetes Service for self-management education, medication review including over the counter medications and herbal products, overall well-being assessment, transition of care and any needed adjustments with updates and recommendations communicated to the referring physician. Patient's name and  verified. Patient Findings:    Patient was to PCP this am.  Patient was put back on lisinopril at 2.5mg once a day. Patient also having issues with infections in folds under arms and will resume cephalexin 500mg four times a day. Patient taking CBD gummies for pain control. Patient states it is working well for pain. Patient saw Dr. Galina Alexandre (endocrinologist) on 22.   Patient currently taking insulin at dose of .6 in the morning, .55 in the afternoon and .25 in the evening. Patient getting up and eating breakfast about 8am (scrambled eggs, flour tortilla (3) taco size (daughter makes them at home)  Lunch 2-3pm - bowl cheerios/corn chex/wheat chex with banana  Dinner 6:30/7pm - whatever daughter cooks. States a lot of veges (peans/green beans and carrots)    Patient states his issues is pop. Drinks 2 regular pop a day (pepsi). Other than those two he drinks no sugar pop. Patient is drinking in early afternoon which is likely reason why blood sugar is going up. Patient states he drinks just because he is told he cant so does it anyway. Discussed how regular pop affect blood sugar and it is up to him if he continues but we would recommend he not. Above described diet is different from previous as patient was previously not drinking regular pop as well as was eating irregular meals and eating majority of calories/meals in later half of day. Now patient is eating more regularly and during typical meal times. Patient is wearing O2 at home at 3L and was supposed to get portable O2 but has not come. Patient states when he called on it was told they did not have correct paperwork. Patient discussed with provider today and she is addressing. Patient will get pneumonia shot. Patient has enough Freestyle Lianne supples (sensors). Patient indicates he has been checking his weight at home and weight has been stable. Does not feel he is having any issues with increased edema or water weight. Taking diuretics as instructed. Patient scheduled to see heart failure clinic today after our appt. Patient is due for microalbmin. Order placed and patient will get from outpatient lab after today's appt. Patient reports he wants to come back in about a month to keep him on track. Patient has enough insulin and glucose testing supplies.      A1c done via POC at Dr. Ghazal Carroll appt on 2/24/22 and reported at 8.8. This is significantly increased from before but expect this to come down with resumed use of insulin and improved eating habits. Patient is having some hypoglycemia especially in early am hours. Patient admits there have been a few times when his blood sugar was high at night so took more than instructed insulin. Encouraged him to follow instructions and not change dose on his own.        []  Missed doses   []  Emergency Room Visit    []  Medication changes  []  Hospitalization   [x]  Diet changes   []  Acute illness   []  Activity changes      Symptoms of hypoglycemia    []  None    []  Shakiness    [x]  Lightheadedness or dizziness   []  Confusion      []  Sweating   [x]  Other wakes up from sleeping       Symptoms of hyperglycemia -.    []  None   [x]  Frequent urination    []  Increased thirst   []  Other    Medication adverse reactions (none due to diabetic medicaitons)   [x]  None   []  Diarrhea / Nausea / Vomiting / Constipation / flatulence   []  Hypertension   []  Peripheral edema     []  Signs of infection   []  Headache   []  Vision changes   []  Increased cholesterol    []  Weight gain   []  Change in renal function   []  Increased potassium  []  Other          If recent hospital admission / ED visit, was this related to Diabetes No:Chest pain      Objective     PMHx:    Past Medical History:   Diagnosis Date    Acute kidney injury superimposed on CKD ([address] 4/10/2013    Acute on chronic congestive heart failure ([address]     Acute on chronic kidney failure ([address] 07/20/2017    Acute on chronic respiratory failure ([address] 10/02/2018    Acute on chronic respiratory failure with hypoxia ([address] 9/30/2021    Adhesive capsulitis of left shoulder 03/25/2017    Anemia, normocytic normochromic 9/12/2021    Anxiety 10/02/2016    smokes marijuana for this    Arthropathy, unspecified, other specified sites 06/13/2013    Asthma     B12 deficiency     Bilateral lower leg cellulitis 02/17/2016    Blood in stool     CAD (coronary artery disease)     Cardiovascular stress test abnormal 2018    Cellulitis of both lower extremities 05/25/2017    Cellulitis of leg, left 07/20/2017    CHF (congestive heart failure), NYHA class III (HCC) 08/14/2013    Chronic back pain     Chronic bronchitis ([facility])     Chronic headaches     was referred to neuro, testing scheduled    Chronic infective otitis externa 4/28/2017    Chronic kidney disease     Chronic malignant otitis externa of both ears 7/24/2020    Chronic respiratory failure (HCC)     was on vent    Chronic ulcer of left leg, with fat layer exposed ([address] 02/22/2019    healed    Class 2 severe obesity due to excess calories with serious comorbidity and body mass index (BMI) of 35.0 to 35.9 in adult ([address]     (BMI 35.0-39.9 without comorbidity)    COPD exacerbation ([address] 11/02/2016    Diabetic neuropathy ([facility] 08/14/2013    Displacement of lumbar intervertebral disc without myelopathy 06/13/2013    Ear infection     RIGHT    Elevated troponin     Essential hypertension     Facial cellulitis 2012    Fall 03/25/2017    GERD (gastroesophageal reflux disease)     Head injury     Hearing loss in right ear     pencil pierced ear as a child    Hepatic steatosis 12/03/2015    History of general anesthesia complication     has woke up during surgery under anesthesia    History of rib fracture 12/03/2015    Chronic     Hyperlipidemia     Hypersomnia     can go multiple days without sleeping    Hypertension     Insomnia     Intolerance of continuous positive airway pressure (CPAP) ventilation 07/20/2017    Iron deficiency     Localized rash     gets frequently in axilla, groin, in any fold, on several topical treatments for this    Lymphedema of both lower extremities 4/27/2021    Magnesium deficiency     Mastoiditis of left side     Mixed conductive and sensorineural hearing loss of both ears 01/10/2017    Per ENT  Mixed type COPD (chronic obstructive pulmonary disease) ([address]     On home O2, multiple inhlaers, nebulizer    Moderate recurrent major depression ([address] 10/02/2016    Morbid obesity with BMI of 45.0-49.9, adult ([address] 2015    NSTEMI (non-ST elevated myocardial infarction) ([address]     On home oxygen therapy     3 Lpm prn    Open wound of groin 2018    healed     BARBY on CPAP     Osteoarthritis     Otitis externa of left ear     Pancreatitis chronic     Persistent depressive disorder 2019    Renal insufficiency     proteinuria    Seizures ([address] 2019    Severe depression ([address] 2013    Spinal stenosis of lumbar region without neurogenic claudication 2016    MRI lumbar 12/30/15 L3-L4: There is broad-based bulging disc which appears protruding left laterally causing flattening of the ventral thecal sac. In addition, there is facet arthropathy with mild hypertrophic changes.  There is borderline central canal stenosis with  evidence of moderate left neural foraminal narrowing and mild right neural foramina narrowing.   L4-L5: There is broad-based protrud    Syncope 2017    Tinnitus of both ears 01/10/2017    Per ENT    Type 2 diabetes mellitus with stage 3 chronic kidney disease, with long-term current use of insulin ([address] 2016    due to underlying condition with hyperosmolarity without coma    Type II or unspecified type diabetes mellitus without mention of complication, not stated as uncontrolled     uncontrolled    Uncontrolled type 2 diabetes mellitus with hyperglycemia ([address] 7/15/2013    Unstable angina ([address] 2021    Vitamin D deficiency     Wears glasses     for reading       Social History:    Social History     Tobacco Use    Smoking status: Former Smoker     Packs/day: 0.25     Years: 33.00     Pack years: 8.25     Types: Cigarettes     Start date: 1985     Quit date: 2020     Years since quittin.3    Smokeless tobacco: Former User     Types: Snuff     Quit date: 6/22/1995   Substance Use Topics    Alcohol use: No     Alcohol/week: 0.0 standard drinks       Pertinent Labs:    Lab Results   Component Value Date    LABA1C 8.8 02/24/2022    LABA1C 7.7 (H) 10/23/2021    LABA1C 7.6 07/20/2021     Lab Results   Component Value Date    CHOL 137 09/12/2021    TRIG 416 (H) 09/12/2021    HDL 30 (L) 09/12/2021     Lab Results   Component Value Date    CREATININE 1.46 (H) 03/02/2022    BUN 30 (H) 03/02/2022     03/02/2022    K 4.3 03/02/2022    CL 98 03/02/2022    CO2 27 03/02/2022     Lab Results   Component Value Date    ALT 11 02/10/2022         Wt Readings from Last 3 Encounters:   03/02/22 (!) 420 lb 8 oz (190.7 kg)   03/02/22 (!) 413 lb (187.3 kg)   02/24/22 (!) 413 lb (187.3 kg)       Current medications:  Prior to Admission medications    Medication Sig Start Date End Date Taking? Authorizing Provider   vitamin D3 (CHOLECALCIFEROL) 25 MCG (1000 UT) TABS tablet Take 1 tablet by mouth daily 3/2/22   Violette Crane MD   oxyCODONE HCl (OXY-IR) 10 MG immediate release tablet Take 1 tablet by mouth every 8 hours as needed for Pain for up to 30 days. 3/2/22 4/1/22  Violette Crane MD   nystatin (MYCOSTATIN) 604790 UNIT/GM powder Apply 2 times daily in the skin folds for long term.  3/2/22   Violette Crane MD   cephALEXin (KEFLEX) 500 MG capsule Take 1 capsule by mouth 4 times daily 3/2/22   Violette Crane MD   chlorhexidine (HIBICLENS) 4 % external liquid Use once or twice a day to clean the left armpit, diluted in water and wash the left armpit with it 3/2/22   Violette Crane MD   lisinopril (PRINIVIL;ZESTRIL) 2.5 MG tablet Take 1 tablet by mouth daily Dose decreased 3/2/2022 3/2/22   Violette Crane MD   venlafaxine (EFFEXOR XR) 150 MG extended release capsule Take 1 capsule by mouth every morning Dose increased 3/2/2022 3/2/22   Violette Crane MD   naloxone 4 MG/0.1ML LIQD nasal spray 1 spray by Nasal route as needed for Opioid Reversal Patient needs counseling 2/27/22   Jovanni Reddy MD   magnesium oxide (MAG-OX) 400 MG tablet  2/10/22   Historical Provider, MD   gabapentin (NEURONTIN) 300 MG capsule TAKE ONE CAPSULE BY MOUTH THREE TIMES PER DAY 2/11/22 5/14/22  MD jazmín Laraalbital-acetaminophen-caffeine [facility]) -42 MG per tablet Take 1 tablet by mouth every 8 hours as needed for Headaches 2/11/22   Brant [place] MD   MAGNESIUM-OXIDE 400 (241.3 Mg) MG TABS tablet Take 1 tablet by mouth 2 times daily Frequency increased to 10/20/2022 2/10/22   Jovanni Reddy MD   oxyCODONE HCl (OXY-IR) 10 MG immediate release tablet Take 1 tablet by mouth every 8 hours as needed for Pain for up to 30 days. 2/2/22 3/2/22  Jovanni Reddy MD   pantoprazole (PROTONIX) 40 MG tablet Take 1 tablet by mouth every morning (before breakfast) 1/28/22   Jovanni Reddy MD   nystatin (MYCOSTATIN) 583824 UNIT/ML suspension Take 5 mLs by mouth 4 times daily Swish and swallow 1/19/22   Jovanni Reddy MD   albuterol (PROVENTIL) (2.5 MG/3ML) 0.083% nebulizer solution USE THREE MILLILITERS VIA NEBULIZATION BY MOUTH EVERY 6 HOURS AS NEEDED FOR WHEEZING OR FOR SHORTNESS OF BREATH 12/27/21   Jovanni Reddy MD   tiZANidine (ZANAFLEX) 4 MG tablet Take 1 tablet by mouth every 8 hours as needed (Muscle spasms) 12/23/21   Jovanni Reddy MD   Blood Pressure KIT Diagnosis: HTN. Needs to check blood pressure 1-2 times a day until stable, then once a day. Goal blood pressure less than 135/85, and above 110/60. 12/16/21   Jovanni Reddy MD   allopurinol (ZYLOPRIM) 300 MG tablet Take 1 tablet by mouth daily Dose increased 12/9/2021 .   Stop if any rash develops 12/9/21   Jovanni Reddy MD   FEROSUL 325 (65 Fe) MG tablet TAKE ONE TABLET BY MOUTH DAILY 12/1/21   Jovanni Reddy MD   SPIRIVA RESPIMAT 2.5 MCG/ACT AERS inhaler INHALE TWO PUFFS BY MOUTH DAILY 12/1/21   Paige 100 MG capsule Take 1 capsule by mouth 2 times daily For constipation 8/17/21   June Burger MD   latanoprost (XALATAN) 0.005 % ophthalmic solution 1 drop nightly    Historical Provider, MD   clopidogrel (PLAVIX) 75 MG tablet Take 1 tablet by mouth daily 8/11/21   June Burger MD   nitroGLYCERIN (NITROSTAT) 0.4 MG SL tablet DISSOLVE 1 TAB UNDER TONGUE FOR CHEST PAIN - IF PAIN REMAINS AFTER 5 MIN, CALL 911 AND REPEAT DOSE. MAX 3 TABS IN 15 MINUTES 8/2/21   June Burger MD   insulin regular human (HUMULIN R) 500 UNIT/ML concentrated injection vial Patient using 0.52ml with breakfast, 0.52ml with lunch and 0.45ml with dinner. Patient taking differently: Patient using 0.60ml with breakfast, 0.52ml with lunch and 0.25 ml with dinner. 6/28/21   June Burger MD   rosuvastatin (CRESTOR) 10 MG tablet Take 1 tablet by mouth nightly Stop pravastatin 5/21/21   June Burger MD   Continuous Blood Gluc Sensor (FREESTYLE CRISTINE 2 SENSOR) [facility] Patient to apply a new sensor every 14 days. RX to cover voucher patient will bring in. 5/19/21   June Burger MD   Gauze Pads & Dressings 4\"X4\" PADS Twice a day dressing of right leg wound  Patient not taking: Reported on 3/2/2022 4/23/21   June Burger MD   Gauze Bandages (ROLLED GAUZE BANDAGE 4\"X2. 5YD) MISC For twice a day dressing  Patient not taking: Reported on 3/2/2022 4/23/21   June Burger MD   PROAIR  (90 Base) MCG/ACT inhaler INHALE TWO PUFFS BY MOUTH EVERY 6 HOURS AS NEEDED FOR WHEEZING OR FOR SHORTNESS OF BREATH 4/13/21   June Burger MD   nystatin (MYCOSTATIN) 209228 UNIT/GM powder Apply 2 times daily in the skin folds for several months 3/31/21 3/2/22  June Burger MD   clobetasol (TEMOVATE) 0.05 % ointment Apply topically 2 times daily for psoriasis 1/27/21   June Burger MD   ketoconazole (NIZORAL) 2 % cream Apply twice a day for yeast infection in the skin folds, for 4 weeks 1/20/21 Follow-up:   Dr Shubham Mckinney     Medication Management Follow-up:   Diabetes Service   4 weeks with check in via phone if needed. Electronically signed by Elizabeth Lin RPH on 3/2/2022 at 9:42 PM     For Pharmacy [id number] in place: Yes   Recommendation Provided To: Patient/Caregiver: 4 via In person   Intervention Detail: Dose Adjustment: 1, reason: Therapy Optimization, New Rx: 1, reason: Needs Additional Therapy, Refill(s) Provided and Scheduled Appointment   Intervention Accepted By: Patient/Caregiver: 4   Time Spent (min): 45     Yesenia Rizvi [facility],Pharm. D,, BCPS, CACP  3/2/2022  9:42 PM

## 2022-03-02 NOTE — PATIENT INSTRUCTIONS
Personalized Preventive Plan for Figueroa Huerta. - 3/2/2022  Medicare offers a range of preventive health benefits. Some of the tests and screenings are paid in full while other may be subject to a deductible, co-insurance, and/or copay. Some of these benefits include a comprehensive review of your medical history including lifestyle, illnesses that may run in your family, and various assessments and screenings as appropriate. After reviewing your medical record and screening and assessments performed today your provider may have ordered immunizations, labs, imaging, and/or referrals for you. A list of these orders (if applicable) as well as your Preventive Care list are included within your After Visit Summary for your review. Other Preventive Recommendations:    · A preventive eye exam performed by an eye specialist is recommended every 1-2 years to screen for glaucoma; cataracts, macular degeneration, and other eye disorders. · A preventive dental visit is recommended every 6 months. · Try to get at least 150 minutes of exercise per week or 10,000 steps per day on a pedometer . · Order or download the FREE \"Exercise & Physical Activity: Your Everyday Guide\" from [employer]. Call 7-274.808.3470 or search [employer] online. · You need 3778-8103 mg of calcium and 6661-4022 IU of vitamin D per day. It is possible to meet your calcium requirement with diet alone, but a vitamin D supplement is usually necessary to meet this goal.  · When exposed to the sun, use a sunscreen that protects against both UVA and UVB radiation with an SPF of 30 or greater. Reapply every 2 to 3 hours or after sweating, drying off with a towel, or swimming. · Always wear a seat belt when traveling in a car. Always wear a helmet when riding a bicycle or motorcycle. Heart-Healthy Diet   Sodium, Fat, and Cholesterol Controlled Diet       What Is a Heart Healthy Diet?    A good cholesterol, this type of cholesterol actually carries cholesterol away from your arteries and may, therefore, help lower your risk of having a heart attack. You want this level to be high (ideally greater than 60). It is a risk to have a level less than 40. You can raise this good cholesterol by eating olive oil, canola oil, avocados, or nuts. Exercise raises this level, too. Fat    Fat is calorie dense and packs a lot of calories into a small amount of food. Even though fats should be limited due to their high calorie content, not all fats are bad. In fact, some fats are quite healthful. Fat can be broken down into four main types. The good-for-you fats are:   Monounsaturated fat  found in oils such as olive and canola, avocados, and nuts and natural nut butters; can decrease cholesterol levels, while keeping levels of HDL cholesterol high   Polyunsaturated fat  found in oils such as safflower, sunflower, soybean, corn, and sesame; can decrease total cholesterol and LDL cholesterol   Omega-3 fatty acids  particularly those found in fatty fish (such as salmon, trout, tuna, mackerel, herring, and sardines); can decrease risk of arrhythmias, decrease triglyceride levels, and slightly lower blood pressure   The fats that you want to limit are:   Saturated fat  found in animal products, many fast foods, and a few vegetables; increases total blood cholesterol, including LDL levels   Animal fats that are saturated include: butter, lard, whole-milk dairy products, meat fat, and poultry skin   Vegetable fats that are saturated include: hydrogenated shortening, palm oil, coconut oil, cocoa butter   Hydrogenated or trans fat  found in margarine and vegetable shortening, most shelf stable snack foods, and fried foods; increases LDL and decreases HDL     It is generally recommended that you limit your total fat for the day to less than 30% of your total calories.  If you follow an 1800-calorie heart healthy diet, for example, this would mean 60 grams of fat or less per day. Saturated fat and trans fat in your diet raises your blood cholesterol the most, much more than dietary cholesterol does. For this reason, on a heart-healthy diet, less than 7% of your calories should come from saturated fat and ideally 0% from trans fat. On an 1800-calorie diet, this translates into less than 14 grams of saturated fat per day, leaving 46 grams of fat to come from mono- and polyunsaturated fats.    Food Choices on a Heart Healthy Diet   Food Category   Foods Recommended   Foods to Avoid   Grains   Breads and rolls without salted tops Most dry and cooked cereals Unsalted crackers and breadsticks Low-sodium or homemade breadcrumbs or stuffing All rice and pastas   Breads, rolls, and crackers with salted tops High-fat baked goods (eg, muffins, donuts, pastries) Quick breads, self-rising flour, and biscuit mixes Regular bread crumbs Instant hot cereals Commercially prepared rice, pasta, or stuffing mixes   Vegetables   Most fresh, frozen, and low-sodium canned vegetables Low-sodium and salt-free vegetable juices Canned vegetables if unsalted or rinsed   Regular canned vegetables and juices, including sauerkraut and pickled vegetables Frozen vegetables with sauces Commercially prepared potato and vegetable mixes   Fruits   Most fresh, frozen, and canned fruits All fruit juices   Fruits processed with salt or sodium   Milk   Nonfat or low-fat (1%) milk Nonfat or low-fat yogurt Cottage cheese, low-fat ricotta, cheeses labeled as low-fat and low-sodium   Whole milk Reduced-fat (2%) milk Malted and chocolate milk Full fat yogurt Most cheeses (unless low-fat and low salt) Buttermilk (no more than 1 cup per week)   Meats and Beans   Lean cuts of fresh or frozen beef, veal, lamb, or pork (look for the word loin) Fresh or frozen poultry without the skin Fresh or frozen fish and some shellfish Egg whites and egg substitutes (Limit whole eggs to three per week) Tofu Nuts or seeds (unsalted, dry-roasted), low-sodium peanut butter Dried peas, beans, and lentils   Any smoked, cured, salted, or canned meat, fish, or poultry (including villegas, chipped beef, cold cuts, hot dogs, sausages, sardines, and anchovies) Poultry skins Breaded and/or fried fish or meats Canned peas, beans, and lentils Salted nuts   Fats and Oils   Olive oil and canola oil Low-sodium, low-fat salad dressings and mayonnaise   Butter, margarine, coconut and palm oils, villegas fat   Snacks, Sweets, and Condiments   Low-sodium or unsalted versions of broths, soups, soy sauce, and condiments Pepper, herbs, and spices; vinegar, lemon, or lime juice Low-fat frozen desserts (yogurt, sherbet, fruit bars) Sugar, cocoa powder, honey, syrup, jam, and preserves Low-fat, trans-fat free cookies, cakes, and pies Kanu and animal crackers, fig bars, sabrina snaps   High-fat desserts Broth, soups, gravies, and sauces, made from instant mixes or other high-sodium ingredients Salted snack foods Canned olives Meat tenderizers, seasoning salt, and most flavored vinegars   Beverages   Low-sodium carbonated beverages Tea and coffee in moderation Soy milk   Commercially softened water   Suggestions   Make whole grains, fruits, and vegetables the base of your diet. Choose heart-healthy fats such as canola, olive, and flaxseed oil, and foods high in heart-healthy fats, such as nuts, seeds, soybeans, tofu, and fish. Eat fish at least twice per week; the fish highest in omega-3 fatty acids and lowest in mercury include salmon, herring, mackerel, sardines, and canned chunk light tuna. If you eat fish less than twice per week or have high triglycerides, talk to your doctor about taking fish oil supplements. Read food labels.    For products low in fat and cholesterol, look for fat free, low-fat, cholesterol free, saturated fat free, and trans fat freeAlso scan the Nutrition Facts Label, which lists saturated fat, trans fat, and cholesterol amounts. For products low in sodium, look for sodium free, very low sodium, low sodium, no added salt, and unsalted   Skip the salt when cooking or at the table; if food needs more flavor, get creative and try out different herbs and spices. Garlic and onion also add substantial flavor to foods. Trim any visible fat off meat and poultry before cooking, and drain the fat off after cornelius. Use cooking methods that require little or no added fat, such as grilling, boiling, baking, poaching, broiling, roasting, steaming, stir-frying, and sauting. Avoid fast food and convenience food. They tend to be high in saturated and trans fat and have a lot of added salt. Talk to a registered dietitian for individualized diet advice. Last Reviewed: March 2011 Corinne Herrlich, MS, MPH, RD   Updated: 3/29/2011   ·     Heart-Healthy Diet   Sodium, Fat, and Cholesterol Controlled Diet       What Is a Heart Healthy Diet? A heart-healthy diet is one that limits sodium , certain types of fat , and cholesterol . This type of diet is recommended for:   People with any form of cardiovascular disease (eg, coronary heart disease , peripheral vascular disease , previous heart attack , previous stroke )   People with risk factors for cardiovascular disease, such as high blood pressure , high cholesterol , or diabetes   Anyone who wants to lower their risk of developing cardiovascular disease   Sodium    Sodium is a mineral found in many foods. In general, most people consume much more sodium than they need. Diets high in sodium can increase blood pressure and lead to edema (water retention). On a heart-healthy diet, you should consume no more than 2,300 mg (milligrams) of sodium per dayabout the amount in one teaspoon of table salt. The foods highest in sodium include table salt (about 50% sodium), processed foods, convenience foods, and preserved foods.    Cholesterol    Cholesterol is a fat-like, waxy substance in your blood. Our bodies make some cholesterol. It is also found in animal products, with the highest amounts in fatty meat, egg yolks, whole milk, cheese, shellfish, and organ meats. On a heart-healthy diet, you should limit your cholesterol intake to less than 200 mg per day. It is normal and important to have some cholesterol in your bloodstream. But too much cholesterol can cause plaque to build up within your arteries, which can eventually lead to a heart attack or stroke. The two types of cholesterol that are most commonly referred to are:   Low-density lipoprotein (LDL) cholesterol  Also known as bad cholesterol, this is the cholesterol that tends to build up along your arteries. Bad cholesterol levels are increased by eating fats that are saturated or hydrogenated. Optimal level of this cholesterol is less than 100. Over 130 starts to get risky for heart disease. High-density lipoprotein (HDL) cholesterol  Also known as good cholesterol, this type of cholesterol actually carries cholesterol away from your arteries and may, therefore, help lower your risk of having a heart attack. You want this level to be high (ideally greater than 60). It is a risk to have a level less than 40. You can raise this good cholesterol by eating olive oil, canola oil, avocados, or nuts. Exercise raises this level, too. Fat    Fat is calorie dense and packs a lot of calories into a small amount of food. Even though fats should be limited due to their high calorie content, not all fats are bad. In fact, some fats are quite healthful. Fat can be broken down into four main types.    The good-for-you fats are:   Monounsaturated fat  found in oils such as olive and canola, avocados, and nuts and natural nut butters; can decrease cholesterol levels, while keeping levels of HDL cholesterol high   Polyunsaturated fat  found in oils such as safflower, sunflower, soybean, corn, and sesame; can decrease total cholesterol and LDL cholesterol   Omega-3 fatty acids  particularly those found in fatty fish (such as salmon, trout, tuna, mackerel, herring, and sardines); can decrease risk of arrhythmias, decrease triglyceride levels, and slightly lower blood pressure   The fats that you want to limit are:   Saturated fat  found in animal products, many fast foods, and a few vegetables; increases total blood cholesterol, including LDL levels   Animal fats that are saturated include: butter, lard, whole-milk dairy products, meat fat, and poultry skin   Vegetable fats that are saturated include: hydrogenated shortening, palm oil, coconut oil, cocoa butter   Hydrogenated or trans fat  found in margarine and vegetable shortening, most shelf stable snack foods, and fried foods; increases LDL and decreases HDL     It is generally recommended that you limit your total fat for the day to less than 30% of your total calories. If you follow an 1800-calorie heart healthy diet, for example, this would mean 60 grams of fat or less per day. Saturated fat and trans fat in your diet raises your blood cholesterol the most, much more than dietary cholesterol does. For this reason, on a heart-healthy diet, less than 7% of your calories should come from saturated fat and ideally 0% from trans fat. On an 1800-calorie diet, this translates into less than 14 grams of saturated fat per day, leaving 46 grams of fat to come from mono- and polyunsaturated fats.    Food Choices on a Heart Healthy Diet   Food Category   Foods Recommended   Foods to Avoid   Grains   Breads and rolls without salted tops Most dry and cooked cereals Unsalted crackers and breadsticks Low-sodium or homemade breadcrumbs or stuffing All rice and pastas   Breads, rolls, and crackers with salted tops High-fat baked goods (eg, muffins, donuts, pastries) Quick breads, self-rising flour, and biscuit mixes Regular bread crumbs Instant hot cereals Commercially prepared rice, pasta, or stuffing mixes Vegetables   Most fresh, frozen, and low-sodium canned vegetables Low-sodium and salt-free vegetable juices Canned vegetables if unsalted or rinsed   Regular canned vegetables and juices, including sauerkraut and pickled vegetables Frozen vegetables with sauces Commercially prepared potato and vegetable mixes   Fruits   Most fresh, frozen, and canned fruits All fruit juices   Fruits processed with salt or sodium   Milk   Nonfat or low-fat (1%) milk Nonfat or low-fat yogurt Cottage cheese, low-fat ricotta, cheeses labeled as low-fat and low-sodium   Whole milk Reduced-fat (2%) milk Malted and chocolate milk Full fat yogurt Most cheeses (unless low-fat and low salt) Buttermilk (no more than 1 cup per week)   Meats and Beans   Lean cuts of fresh or frozen beef, veal, lamb, or pork (look for the word loin) Fresh or frozen poultry without the skin Fresh or frozen fish and some shellfish Egg whites and egg substitutes (Limit whole eggs to three per week) Tofu Nuts or seeds (unsalted, dry-roasted), low-sodium peanut butter Dried peas, beans, and lentils   Any smoked, cured, salted, or canned meat, fish, or poultry (including villegas, chipped beef, cold cuts, hot dogs, sausages, sardines, and anchovies) Poultry skins Breaded and/or fried fish or meats Canned peas, beans, and lentils Salted nuts   Fats and Oils   Olive oil and canola oil Low-sodium, low-fat salad dressings and mayonnaise   Butter, margarine, coconut and palm oils, villegas fat   Snacks, Sweets, and Condiments   Low-sodium or unsalted versions of broths, soups, soy sauce, and condiments Pepper, herbs, and spices; vinegar, lemon, or lime juice Low-fat frozen desserts (yogurt, sherbet, fruit bars) Sugar, cocoa powder, honey, syrup, jam, and preserves Low-fat, trans-fat free cookies, cakes, and pies Kanu and animal crackers, fig bars, sabrina snaps   High-fat desserts Broth, soups, gravies, and sauces, made from instant mixes or other high-sodium ingredients Salted snack foods Canned olives Meat tenderizers, seasoning salt, and most flavored vinegars   Beverages   Low-sodium carbonated beverages Tea and coffee in moderation Soy milk   Commercially softened water   Suggestions   Make whole grains, fruits, and vegetables the base of your diet. Choose heart-healthy fats such as canola, olive, and flaxseed oil, and foods high in heart-healthy fats, such as nuts, seeds, soybeans, tofu, and fish. Eat fish at least twice per week; the fish highest in omega-3 fatty acids and lowest in mercury include salmon, herring, mackerel, sardines, and canned chunk light tuna. If you eat fish less than twice per week or have high triglycerides, talk to your doctor about taking fish oil supplements. Read food labels. For products low in fat and cholesterol, look for fat free, low-fat, cholesterol free, saturated fat free, and trans fat freeAlso scan the Nutrition Facts Label, which lists saturated fat, trans fat, and cholesterol amounts. For products low in sodium, look for sodium free, very low sodium, low sodium, no added salt, and unsalted   Skip the salt when cooking or at the table; if food needs more flavor, get creative and try out different herbs and spices. Garlic and onion also add substantial flavor to foods. Trim any visible fat off meat and poultry before cooking, and drain the fat off after cornelius. Use cooking methods that require little or no added fat, such as grilling, boiling, baking, poaching, broiling, roasting, steaming, stir-frying, and sauting. Avoid fast food and convenience food. They tend to be high in saturated and trans fat and have a lot of added salt. Talk to a registered dietitian for individualized diet advice. Last Reviewed: March 2011 Compa Hernandez MS, MPH, RD   Updated: 3/29/2011   ·     High-Fiber Diet     What Is Fiber? Dietary fiber is a form of carbohydrate found in plants that cannot be digested by humans.  All plants snacks, such as fruits, popcorn, whole-grain crackers, and nuts. Make whole-grain cereal or whole-grain toast part of your daily breakfast regime. When eating out, whether ordering a sandwich or dinner, ask for extra vegetables. When baking, replace part of the white flour with whole-wheat flour. Whole-wheat flour is particularly easy to incorporate into a recipe. High-Fiber Diet Eating Guide   Food Category   Foods Recommended   Notes   Grains   Whole-grain breads, muffins, bagels, or alena bread Rye bread Whole-wheat crackers or crisp breads Whole-grain or bran cereals Oatmeal, oat bran, or grits Wheat germ Whole-wheat pasta and brown rice   Read the ingredients list on food labels. Look for products that list \"whole\" as the first ingredient (eg, whole-wheat, whole oats). Choose cereals with at least 2 grams of fiber per serving. Vegetables   All vegetables, especially asparagus, bean sprouts, broccoli, [place] sprouts, cabbage, carrots, cauliflower, celery, corn, greens, green beans, green pepper, onions, peas, potatoes (with skin), snow peas, spinach, squash, sweet potatoes, tomatoes, zucchini   For maximum fiber intake, eat the peels of fruits and vegetablesjust be sure to wash them well first.   Fruits   All fruits, especially apples, berries, grapefruits, mangoes, nectarines, oranges, peaches, pears, dried fruits (figs, dates, prunes, raisins)   Choose raw fruits and vegetables over juice, cooked, or cannedraw fruit has more fiber. Dried fruit is also a good source of fiber. Milk   With the exception of yogurt containing inulin (a type of fiber), dairy foods provide little fiber. Add more fiber by topping your yogurt or cottage cheese with fresh fruit, whole grain or bran cereals, nuts, or seeds.    Meats and Beans   All beans and peas, especially Garbanzo beans, kidney beans, lentils, lima beans, split peas, and ca beans All nuts and seeds, especially almonds, peanuts, [place] nuts, cashews, peanut butter, walnuts, sesame and sunflower seeds All meat, poultry, fish, and eggs   Increase fiber in meat dishes by adding ca beans, kidney beans, black-eyed peas, bran, or oatmeal. If you are following a low-fat diet, use nuts and seeds only in moderation. Fats and Oils   All in moderation   Fats and oils do not provide fiber   Snacks, Sweets, and Condiments   Fruit Nuts Popcorn, whole-wheat pretzels, or trail mix made with dried fruits, nuts, and seeds Cakes, breads, and cookies made with oatmeal or whole-wheat flour   Most snack foods do not provide much fiber. Choose snacks with at least 2 grams of fiber per serving. Last Reviewed: March 2011 Vikki Dixon MS, MPH, RD   Updated: 3/29/2011   ·   Smoking Cessation  This document explains the best ways for you to quit smoking and new treatments to help. It lists new medicines that can double or triple your chances of quitting and quitting for good. It also considers ways to avoid relapses and concerns you may have about quitting, including weight gain. NICOTINE: A POWERFUL ADDICTION  If you have tried to quit smoking, you know how hard it can be. It is hard because nicotine is a very addictive drug. For some people, it can be as addictive as heroin or cocaine. Usually, people make 2 or 3 tries, or more, before finally being able to quit. Each time you try to quit, you can learn about what helps and what hurts. Quitting takes hard work and a lot of effort, but you can quit smoking. QUITTING SMOKING IS ONE OF THE MOST IMPORTANT THINGS YOU WILL EVER DO. You will live longer, feel better, and live better. The impact on your body of quitting smoking is felt almost immediately:  Within 20 minutes, blood pressure decreases. Pulse returns to its normal level. After 8 hours, carbon monoxide levels in the blood return to normal. Oxygen level increases. After 24 hours, chance of heart attack starts to decrease.  Breath, hair, and body stop smelling like smoke. After 48 hours, damaged nerve endings begin to recover. Sense of taste and smell improve. After 72 hours, the body is virtually free of nicotine. Bronchial tubes relax and breathing becomes easier. After 2 to 12 weeks, lungs can hold more air. Exercise becomes easier and circulation improves. Quitting will reduce your risk of having a heart attack, stroke, cancer, or lung disease:  After 1 year, the risk of coronary heart disease is cut in half. After 5 years, the risk of stroke falls to the same as a nonsmoker. After 10 years, the risk of lung cancer is cut in half and the risk of other cancers decreases significantly. After 15 years, the risk of coronary heart disease drops, usually to the level of a nonsmoker. If you are pregnant, quitting smoking will improve your chances of having a healthy baby. The people you live with, especially your children, will be healthier. You will have extra money to spend on things other than cigarettes. FIVE KEYS TO QUITTING  Studies have shown that these 5 steps will help you quit smoking and quit for good. You have the best chances of quitting if you use them together:  Get ready. Get support and encouragement. Learn new skills and behaviors. Get medicine to reduce your nicotine addiction and use it correctly. Be prepared for relapse or difficult situations. Be determined to continue trying to quit, even if you do not succeed at first.  1. GET READY  Set a quit date. Change your environment. Get rid of ALL cigarettes, ashtrays, matches, and lighters in your home, car, and place of work. Do not let people smoke in your home. Review your past attempts to quit. Think about what worked and what did not. Once you quit, do not smoke. NOT EVEN A PUFF! 2. GET SUPPORT AND ENCOURAGEMENT  Studies have shown that you have a better chance of being successful if you have help. You can get support in many ways.   Tell your family, friends, and coworkers that you are going to quit and need their support. Ask them not to smoke around you. Talk to your caregivers (doctor, dentist, nurse, pharmacist, psychologist, and/or smoking counselor). Get individual, group, or telephone counseling and support. The more counseling you have, the better your chances are of quitting. Programs are available at [facility]. Call your local health department for information about programs in your area. Spiritual beliefs and practices may help some smokers quit. Quit meters are small computer programs online or downloadable that keep track of quit statistics, such as amount of \"quit-time,\" cigarettes not smoked, and money saved. Many smokers find one or more of the many self-help books available useful in helping them quit and stay off tobacco.  3. LEARN NEW SKILLS AND BEHAVIORS  Try to distract yourself from urges to smoke. Talk to someone, go for a walk, or occupy your time with a task. When you first try to quit, change your routine. Take a different route to work. Drink tea instead of coffee. Eat breakfast in a different place. Do something to reduce your stress. Take a hot bath, exercise, or read a book. Plan something enjoyable to do every day. Reward yourself for not smoking. Explore interactive web-based programs that specialize in helping you quit. 4. GET MEDICINE AND USE IT CORRECTLY  Medicines can help you stop smoking and decrease the urge to smoke. Combining medicine with the above behavioral methods and support can quadruple your chances of successfully quitting smoking. The U[place]. Food and Drug Administration (FDA) has approved 7 medicines to help you quit smoking. These medicines fall into 3 categories. Nicotine replacement therapy (delivers nicotine to your body without the negative effects and risks of smoking):  Nicotine gum: Available over-the-counter. Nicotine lozenges: Available over-the-counter.   Nicotine inhaler: Available by prescription. Nicotine nasal spray: Available by prescription. Nicotine skin patches (transdermal): Available by prescription and over-the-counter. Antidepressant medicine (helps people abstain from smoking, but how this works is unknown): Bupropion sustained-release (SR) tablets: Available by prescription. Nicotinic receptor partial agonist (simulates the effect of nicotine in your brain):  Varenicline tartrate tablets: Available by prescription. Ask your caregiver for advice about which medicines to use and how to use them. Carefully read the information on the package. Everyone who is trying to quit may benefit from using a medicine. If you are pregnant or trying to become pregnant, nursing an infant, you are under age 25, or you smoke fewer than 10 cigarettes per day, talk to your caregiver before taking any nicotine replacement medicines. You should stop using a nicotine replacement product and call your caregiver if you experience nausea, dizziness, weakness, vomiting, fast or irregular heartbeat, mouth problems with the lozenge or gum, or redness or swelling of the skin around the patch that does not go away. Do not use any other product containing nicotine while using a nicotine replacement product. Talk to your caregiver before using these products if you have diabetes, heart disease, asthma, stomach ulcers, you had a recent heart attack, you have high blood pressure that is not controlled with medicine, a history of irregular heartbeat, or you have been prescribed medicine to help you quit smoking. 5. BE PREPARED FOR RELAPSE OR DIFFICULT SITUATIONS  Most relapses occur within the first 3 months after quitting. Do not be discouraged if you start smoking again. Remember, most people try several times before they finally quit. You may have symptoms of withdrawal because your body is used to nicotine.  You may crave cigarettes, be irritable, feel very hungry, cough often, get headaches, or have difficulty concentrating. The withdrawal symptoms are only temporary. They are strongest when you first quit, but they will go away within 10 to 14 days. Here are some difficult situations to watch for:  Alcohol. Avoid drinking alcohol. Drinking lowers your chances of successfully quitting. Caffeine. Try to reduce the amount of caffeine you consume. It also lowers your chances of successfully quitting. Other smokers. Being around smoking can make you want to smoke. Avoid smokers. Weight gain. Many smokers will gain weight when they quit, usually less than 10 pounds. Eat a healthy diet and stay active. Do not let weight gain distract you from your main goal, quitting smoking. Some medicines that help you quit smoking may also help delay weight gain. You can always lose the weight gained after you quit. Bad mood or depression. There are a lot of ways to improve your mood other than smoking. If you are having problems with any of these situations, talk to your caregiver. SPECIAL SITUATIONS AND CONDITIONS  Studies suggest that everyone can quit smoking. Your situation or condition can give you a special reason to quit. Pregnant women/new mothers: By quitting, you protect your baby's health and your own. Hospitalized patients: By quitting, you reduce health problems and help healing. Heart attack patients: By quitting, you reduce your risk of a second heart attack. Lung, head, and neck cancer patients: By quitting, you reduce your chance of a second cancer. Parents of children and adolescents: By quitting, you protect your children from illnesses caused by secondhand smoke. QUESTIONS TO THINK ABOUT  Think about the following questions before you try to stop smoking. You may want to talk about your answers with your caregiver. Why do you want to quit? If you tried to quit in the past, what helped and what did not? What will be the most difficult situations for you after you quit?  How will you plan to handle them? Who can help you through the tough times? Your family? Friends? Caregiver? What pleasures do you get from smoking? What ways can you still get pleasure if you quit? Here are some questions to ask your caregiver: How can you help me to be successful at quitting? What medicine do you think would be best for me and how should I take it? What should I do if I need more help? What is smoking withdrawal like? How can I get information on withdrawal?  Quitting takes hard work and a lot of effort, but you can quit smoking. FOR MORE INFORMATION   Smokefree. gov ([employer]able[employer].se) provides free, accurate, evidence-based information and professional assistance to help support the immediate and long-term needs of people trying to quit smoking. Document Released: 12/12/2002 Document Revised: 12/06/2012 Document Reviewed: 10/04/2010  [facility] Patient Information ©2012 [employer] Wright. ·     Keeping Home a [facility]       As we get older, changes in balance, gait, strength, vision, hearing, and cognition make even the most youthful senior more prone to accidents. Falls are one of the leading health risks for older people. This increased risk of falling is related to:   Aging process (eg, decreased muscle strength, slowed reflexes)   Higher incidence of chronic health problems (eg, arthritis, diabetes) that may limit mobility, agility or sensory awareness   Side effects of medicine (eg, dizziness, blurred vision)especially medicines like prescription pain medicines and drugs used to treat mental health conditions   Depending on the brittleness of your bones, the consequences of a fall can be serious and long lasting. Home Life   Research by the Association of Aging [facility]) shows that some home accidents among older adults can be prevented by making simple lifestyle changes and basic modifications and repairs to the home environment.  Here are some lifestyle changes that experts recommend:   Have your hearing and vision checked regularly. Be sure to wear prescription glasses that are right for you. Speak to your doctor or pharmacist about the possible side effects of your medicines. A number of medicines can cause dizziness. If you have problems with sleep, talk to your doctor. Limit your intake of alcohol. If necessary, use a cane or walker to help maintain your balance. Wear supportive, rubber-soled shoes, even at home. If you live in a region that gets wintry weather, you may want to put special cleats on your shoes to prevent you from slipping on the snow and ice. Exercise regularly to help maintain muscle tone, agility, and balance. Always hold the banister when going up or down stairs. Also, use  bars when getting in or out of the bath or shower, or using the toilet. To avoid dizziness, get up slowly from a lying down position. Sit up first, dangling your legs for a minute or two before rising to a standing position. Overall Home Safety Check   According to the Consumer Product Safety Commision's \"Older Consumer Home Safety Checklist,\" it is important to check for potential hazards in each room. And remember, proper lighting is an essential factor in home safety. If you cannot see clearly, you are more likely to fall. Important questions to ask yourself include:   Are lamp, electric, extension, and telephone cords placed out of the flow of traffic and maintained in good condition? Have frayed cords been replaced? Are all small rugs and runners slip resistant? If not, you can secure them to the floor with a special double-sided carpet tape. Are smoke detectors properly locatedone on every floor of your home and one outside of every sleeping area? Are they in good working order? Are batteries replaced at least once a year? Do you have a well-maintained carbon monoxide detector outside every sleeping are in your home?    Does your furniture layout leave plenty of space to maneuver between and around chairs, tables, beds, and sofas? Are hallways, stairs and passages between rooms well lit? Can you reach a lamp without getting out of bed? Are floor surfaces well maintained? Shag rugs, high-pile carpeting, tile floors, and polished wood floors can be particularly slippery. Stairs should always have handrails and be carpeted or fitted with a non-skid tread. Is your telephone easily reachable. Is the cord safely tucked away? Room by Room   According to the Association of Aging, bathrooms and tom are the two most potentially hazardous rooms in your home. In the Kitchen    Be sure your stove is in proper working order and always make sure burners and the oven are off before you go out or go to sleep. Keep pots on the back burners, turn handles away from the front of the stove, and keep stove clean and free of grease build-up. Kitchen ventilation systems and range exhausts should be working properly. Keep flammable objects such as towels and pot holders away from the cooking area except when in use. Make sure kitchen curtains are tied back. Move cords and appliances away from the sink and hot surfaces. If extension cords are needed, install wiring guides so they do not hang over the sink, range, or working areas. Look for coffee pots, kettles and toaster ovens with automatic shut-offs. Keep a mop handy in the kitchen so you can wipe up spills instantly. You should also have a small fire extinguisher. Arrange your kitchen with frequently used items on lower shelves to avoid the need to stand on a stepstool to reach them. Make sure countertops are well-lit to avoid injuries while cutting and preparing food. In the Bathroom    Use a non-slip mat or decals in the tub and shower, since wet, soapy tile or porcelain surfaces are extremely slippery. Make sure bathroom rugs are non-skid or tape them firmly to the floor.  Bathtubs should have at least one, preferably two, grab bars, firmly attached to structural supports in the wall. (Do not use built-in soap holders or glass shower doors as grab bars.)    Tub seats fitted with non-slip material on the legs allow you to wash sitting down. For people with limited mobility, bathtub transfer benches allow you to slide safely into the tub. Raised toilet seats and toilet safety rails are helpful for those with knee or hip problems. In the [place]    Make sure you use a nightlight and that the area around your bed is clear of potential obstacles. Be careful with electric blankets and never go to sleep with a heating pad, which can cause serious burns even if on a low setting. Use fire-resistant mattress covers and pillows, and NEVER smoke in bed. Keep a phone next to the bed that is programmed to dial 911 at the push of a button. If you have a chronic condition, you may want to sign on with an automatic call-in service. Typically the system includes a small pendant that connects directly to an emergency medical voice-response system. You should also make arrangements to stay in contact with someonefriend, neighbor, family memberon a regular schedule. Fire Prevention   According to the [employer]. (Smoke Alarms for Every) [address] [place] [address], senior citizens are one of the two highest risk groups for death and serious injuries due to residential fires. When cooking, wear short-sleeved items, never a bulky long-sleeved robe. The [facility]'s Safety Checklist for Older Consumers emphasizes the importance of checking basements, garages, workshops and storage areas for fire hazards, such as volatile liquids, piles of old rags or clothing and overloaded circuits. Never smoke in bed or when lying down on a couch or recliner chair. Small portable electric or kerosene heaters are responsible for many home fires and should be used cautiously if at all.  If you do use one, be sure to keep them away from flammable materials. In case of fire, make sure you have a pre-established emergency exit plan. Have a professional check your fireplace and other fuel-burning appliances yearly. Helping [employer]   Baby boomers entering the guerrero years will continue to see the development of new products to help older adults live safely and independently in spite of age-related changes. Making Life More Livable  , by Claire Underwood, lists over 1,000 products for \"living well in the mature years,\" such as bathing and mobility aids, household security devices, ergonomically designed knives and peelers, and faucet valves and knobs for temperature control. Medical supply stores and organizations are good sources of information about products that improve your quality of life and insure your safety.      Last Reviewed: November 2009 Dorcas Arteaga MD   Updated: 3/7/2011     ·

## 2022-03-02 NOTE — RESULT ENCOUNTER NOTE
Please notify patient:   Chronic kidney disease stage III slightly improved from priorBlood glucose 217 high    Congestive heart failure is under great control  Otherwise labs within normal limits  continue current treatment    Future Appointments  3/17/2022  8:00 AM    Clare Dawson MD     [facility]        [facility]  3/22/2022  10:00 AM   Tami Ley MD        [facility]           [facility]  3/28/2022  9:30 AM    [facility]       [facility]        [facility]  3/7/2023   9:00 AM    Halima Wilson MD     [facility]

## 2022-03-02 NOTE — PROGRESS NOTES
Pt weight up 7 lbs, BNP stable 177. Pt taking his meds as prescribed and is watching his diet and blood sugars. Pt has CGM. Pt states he is dong well, lisinopril changed to 2.5 mg. VSS. Pt uses 3L NC at HS with CPAP. Pt states he is feeling fair emotionally and has a good support system. Pt enc to increase activity. Next [facility]F Clinic appointment scheduled for April 7 2022 at 11:30. Labs ordered. Verbally reviewed importance of medication compliance with patient; patient verbalized understanding. Discussed 2000mg/day sodium restricted diet; patient verbalized understanding. Moderate daily exercise encouraged as tolerated. Discussed rest breaks as needed; patient verbalized understanding. Patient instructed to weigh self at the same time of each day, using same clothes and same scale; reinforced teaching to monitor for 3-5 lb weight increase over 1-2 days, and to notify the CHF clinic at [phone] or physician office if weight change noted. Patient verbalized understanding. Risks of smoking discussed with the patient if applicable; patient strongly discouraged to smoke. Patient verbalized understanding. Signs and symptoms of CHF discussed with patient, such as feeling more tired than normal, feeling short of breath, coughing that increases when you lie down, sudden weight gain, swelling of your feet, legs or belly. Patient verbalized understanding to notify the CHF clinic at [phone] or physician office if these symptoms occur. Compliance with plan of care and further disease process causes discussed with patient, patient encouraged to keep all follow up appointments. Patient verbalized understanding.

## 2022-03-02 NOTE — RESULT ENCOUNTER NOTE
Urine microalbumin is normal  The urine was diluted but that is fine   I will send him a AIS message    Future Appointments  3/17/2022  8:00 AM    Thea Dixon MD     [facility]        [facility]  3/22/2022  10:00 AM   Emma Arguello MD        [facility]           [facility]  3/28/2022  9:30 AM    [facility]       [facility]        [facility]  4/7/2022   11:30 AM   [facility]  [place] [address]  3/7/2023   9:00 AM    Juliane Dwyer MD     [facility]

## 2022-03-02 NOTE — PROGRESS NOTES
Visit Information    Have you changed or started any medications since your last visit including any over-the-counter medicines, vitamins, or herbal medicines? no   Have you stopped taking any of your medications? Is so, why? -  no  Are you having any side effects from any of your medications? - no    Have you seen any other physician or provider since your last visit? yes -    Have you had any other diagnostic tests since your last visit?  no   Have you been seen in the emergency room and/or had an admission in a hospital since we last saw you?  no   Have you had your routine dental cleaning in the past 6 months?  no     Do you have an active MyChart account? If no, what is the barrier?   Yes    Patient Care Team:  Natali Neves MD as PCP - General (Family Medicine)  Natali Neves MD as PCP - [facility]  Yadiel Shaw MD as Consulting Physician (Endocrinology)  Mary Arango DO as Consulting Physician (Cardiology)  Sharita Haywood DPM as Surgeon (Podiatry)  Tom Jacobsen MD as Consulting Physician (Ophthalmology)  [facility], MD as Consulting Physician (Nephrology)  Todd Roper MD as Consulting Physician (Pulmonology)  Radha Shepherd MD as Surgeon (Vascular Surgery)  Nikki Yost MD as Consulting Physician (Gastroenterology)  MY[facility] as Pharmacist (Pharmacist)  Philip Hu MD as Consulting Physician (Pulmonology)  Bernarda Hart MD as Consulting Physician (Urology)  Kaleb Kirk MD as Surgeon (Ophthalmology)  Zaida Pelayo MD as Consulting Physician (Neurology)  Todd Roper DO as Consulting Physician (Otolaryngology)  Nikki Yost MD as Consulting Physician (Gastroenterology)  NICOLE Sarah CNP as Nurse Practitioner (Otolaryngology)  [facility], MD as Consulting Physician (Nephrology)  Drake Collier MD as Consulting Physician (Oncology)    Medical History Review  Past Medical, Family, and Social History reviewed and does contribute to the patient presenting condition    Health Maintenance   Topic Date Due    Pneumococcal 0-64 years Vaccine (2 of 4 - PCV13) 05/23/2014    Diabetic retinal exam  10/24/2019    Diabetic foot exam  05/02/2020    A1C test (Diabetic or Prediabetic)  01/23/2022    Annual Wellness Visit (AWV)  02/24/2022    Lipid screen  09/12/2022    Depression Monitoring  02/28/2023    Potassium monitoring  02/28/2023    Creatinine monitoring  02/28/2023    Colorectal Cancer Screen  04/12/2024    DTaP/Tdap/Td vaccine (3 - Td or Tdap) 09/12/2028    Hepatitis B vaccine  Completed    Flu vaccine  Completed    Shingles Vaccine  Completed    COVID-19 Vaccine  Completed    Hepatitis C screen  Completed    HIV screen  Completed    Hepatitis A vaccine  Aged Out    Hib vaccine  Aged Out    Meningococcal (ACWY) vaccine  Aged Out

## 2022-03-03 NOTE — RESULT ENCOUNTER NOTE
Noted    Lab Results       Component                Value               Date                       LABA1C                   8.8                 02/24/2022                 LABA1C                   8.8                 02/24/2022                 LABA1C                   7.7 (H)             10/23/2021                Future Appointments  3/17/2022  8:00 AM    Rashmi Bryant MD     [facility]        [facility]  3/22/2022  10:00 AM   Risa Lomax MD        [facility]           [facility]  3/28/2022  9:30 AM    [facility] MEDICATION MGMT       [facility] MED MGMT        [facility]  4/7/2022   11:30 AM   [facility]F [facility]  [address]  3/7/2023   9:00 AM    Belvin Cabot, MD     [facility]

## 2022-03-07 ENCOUNTER — TELEPHONE (OUTPATIENT)
Dept: PHARMACY | Age: 57
End: 2022-03-07

## 2022-03-07 ENCOUNTER — TELEPHONE (OUTPATIENT)
Dept: FAMILY MEDICINE CLINIC | Age: 57
End: 2022-03-07

## 2022-03-07 PROBLEM — L73.2 HIDRADENITIS SUPPURATIVA OF LEFT AXILLA: Status: ACTIVE | Noted: 2022-03-07

## 2022-03-07 PROBLEM — F33.2 MDD (MAJOR DEPRESSIVE DISORDER), RECURRENT SEVERE, WITHOUT PSYCHOSIS (HCC): Status: ACTIVE | Noted: 2022-03-07

## 2022-03-07 RX ORDER — INSULIN HUMAN 500 [IU]/ML
INJECTION, SOLUTION SUBCUTANEOUS
Qty: 60 ML | Refills: 3 | Status: SHIPPED | OUTPATIENT
Start: 2022-03-07 | End: 2022-07-28 | Stop reason: SDUPTHER

## 2022-03-07 NOTE — TELEPHONE ENCOUNTER
Patient called requesting refill of Humulin r U-500 insulin. Prescription for Humulin R U500 - Patient using 0.60ml with breakfast, 0.52ml with lunch and 0.25 ml with dinner.  #60 ml with 3 refills sent to [address] [place] [address] per patient request.   Carrie Feldman, Pharm D, [facility]  3/7/2022 11:48 AM

## 2022-03-08 NOTE — TELEPHONE ENCOUNTER
Please let Dr. LIZAMA's office know, patient has tried to get his portable oxygen approved, if they can send the documentation to [facility] that would be appreciated

## 2022-03-09 ENCOUNTER — HOSPITAL ENCOUNTER (OUTPATIENT)
Facility: MEDICAL CENTER | Age: 57
End: 2022-03-09

## 2022-03-09 NOTE — TELEPHONE ENCOUNTER
Spoke with Manda Donavon from Dr. Ann-Marie Fletcher office, she said she would send the documentation to [facility] today.

## 2022-03-14 DIAGNOSIS — E11.42 TYPE 2 DIABETES MELLITUS WITH DIABETIC POLYNEUROPATHY, WITH LONG-TERM CURRENT USE OF INSULIN (HCC): Primary | ICD-10-CM

## 2022-03-14 DIAGNOSIS — E11.69 DIABETES MELLITUS TYPE 2 IN OBESE (HCC): ICD-10-CM

## 2022-03-14 DIAGNOSIS — Z79.4 TYPE 2 DIABETES MELLITUS WITH DIABETIC POLYNEUROPATHY, WITH LONG-TERM CURRENT USE OF INSULIN (HCC): Primary | ICD-10-CM

## 2022-03-14 DIAGNOSIS — E66.9 DIABETES MELLITUS TYPE 2 IN OBESE (HCC): ICD-10-CM

## 2022-03-14 RX ORDER — FLASH GLUCOSE SENSOR
KIT MISCELLANEOUS
Qty: 2 EACH | Refills: 5 | Status: SHIPPED | OUTPATIENT
Start: 2022-03-14 | End: 2022-05-11 | Stop reason: SDUPTHER

## 2022-03-14 NOTE — TELEPHONE ENCOUNTER
Please Approve or Refuse.   Send to Pharmacy per Pt's Request:      Next Visit Date:  Visit date not found   Last Visit Date: 3/2/2022    Hemoglobin A1C (%)   Date Value   02/24/2022 8.8   02/24/2022 8.8   10/23/2021 7.7 (H)             ( goal A1C is < 7)   BP Readings from Last 3 Encounters:   03/02/22 128/70   03/02/22 (!) 140/52   02/24/22 (!) 110/54          (goal 120/80)  BUN   Date Value Ref Range Status   03/02/2022 30 (H) 6 - 20 mg/dL Final     CREATININE   Date Value Ref Range Status   03/02/2022 1.46 (H) 0.70 - 1.20 mg/dL Final     Potassium   Date Value Ref Range Status   03/02/2022 4.3 3.7 - 5.3 mmol/L Final

## 2022-03-16 ENCOUNTER — HOSPITAL ENCOUNTER (OUTPATIENT)
Dept: PHYSICAL THERAPY | Age: 57
Setting detail: THERAPIES SERIES
Discharge: HOME | End: 2022-03-16

## 2022-03-19 DIAGNOSIS — I50.32 CHRONIC DIASTOLIC CONGESTIVE HEART FAILURE (HCC): ICD-10-CM

## 2022-03-21 RX ORDER — SPIRONOLACTONE 50 MG/1
TABLET, FILM COATED ORAL
Qty: 90 TABLET | Refills: 3 | OUTPATIENT
Start: 2022-03-21

## 2022-03-21 NOTE — TELEPHONE ENCOUNTER
Please Approve or Refuse.   Send to Pharmacy per Pt's Request: [facility]     Next Visit Date:  Visit date not found   Last Visit Date: 3/2/2022    Hemoglobin A1C (%)   Date Value   02/24/2022 8.8   02/24/2022 8.8   10/23/2021 7.7 (H)             ( goal A1C is < 7)   BP Readings from Last 3 Encounters:   03/02/22 128/70   03/02/22 (!) 140/52   02/24/22 (!) 110/54          (goal 120/80)  BUN   Date Value Ref Range Status   03/02/2022 30 (H) 6 - 20 mg/dL Final     CREATININE   Date Value Ref Range Status   03/02/2022 1.46 (H) 0.70 - 1.20 mg/dL Final     Potassium   Date Value Ref Range Status   03/02/2022 4.3 3.7 - 5.3 mmol/L Final

## 2022-03-22 ENCOUNTER — OFFICE VISIT (OUTPATIENT)
Dept: UROLOGY | Age: 57
End: 2022-03-22
Payer: COMMERCIAL

## 2022-03-22 VITALS
SYSTOLIC BLOOD PRESSURE: 128 MMHG | HEIGHT: 72 IN | WEIGHT: 315 LBS | TEMPERATURE: 97.2 F | BODY MASS INDEX: 42.66 KG/M2 | DIASTOLIC BLOOD PRESSURE: 80 MMHG

## 2022-03-22 DIAGNOSIS — N43.0 ENCYSTED HYDROCELE: Primary | ICD-10-CM

## 2022-03-22 DIAGNOSIS — N50.89 EDEMA OF SCROTUM: ICD-10-CM

## 2022-03-22 PROCEDURE — G8427 DOCREV CUR MEDS BY ELIG CLIN: HCPCS | Performed by: UROLOGY

## 2022-03-22 PROCEDURE — G8417 CALC BMI ABV UP PARAM F/U: HCPCS | Performed by: UROLOGY

## 2022-03-22 PROCEDURE — 3017F COLORECTAL CA SCREEN DOC REV: CPT | Performed by: UROLOGY

## 2022-03-22 PROCEDURE — 1036F TOBACCO NON-USER: CPT | Performed by: UROLOGY

## 2022-03-22 PROCEDURE — 99213 OFFICE O/P EST LOW 20 MIN: CPT | Performed by: UROLOGY

## 2022-03-22 PROCEDURE — G8482 FLU IMMUNIZE ORDER/ADMIN: HCPCS | Performed by: UROLOGY

## 2022-03-22 RX ORDER — ESCITALOPRAM OXALATE 10 MG/1
TABLET ORAL
COMMUNITY
Start: 2022-03-19 | End: 2022-06-13 | Stop reason: CLARIF

## 2022-03-22 ASSESSMENT — ENCOUNTER SYMPTOMS
VOMITING: 0
SHORTNESS OF BREATH: 0
WHEEZING: 0
DIARRHEA: 0
COUGH: 0
EYE PAIN: 0
EYE REDNESS: 0
ABDOMINAL PAIN: 0
NAUSEA: 0
CONSTIPATION: 0
BACK PAIN: 0

## 2022-03-22 NOTE — PROGRESS NOTES
Review of Systems   Constitutional: Negative for appetite change, chills and fatigue. Eyes: Negative for pain, redness and visual disturbance. Respiratory: Negative for cough, shortness of breath and wheezing. Cardiovascular: Negative for chest pain and leg swelling. Gastrointestinal: Negative for abdominal pain, constipation, diarrhea, nausea and vomiting. Genitourinary: Positive for difficulty urinating, frequency and urgency. Negative for dysuria, flank pain and hematuria. Musculoskeletal: Negative for back pain, joint swelling and myalgias. Skin: Negative for rash and wound. Neurological: Negative for dizziness, weakness and numbness. Hematological: Does not bruise/bleed easily.

## 2022-03-22 NOTE — PROGRESS NOTES
[address] [address]  Dept:  Jamison Barrera [facility] Urology Office Note - Established    Patient:  Eb Gr. YOB: 1965  Date: 3/22/2022    The patient is a 61 y.o. male who presents todayfor evaluation of the following problems:   Chief Complaint   Patient presents with    Follow-up     6 month, scrotal swelling        HPI  He is here in follow up for hydroceles. He has scrotal edema as well. He is not a good surgical candidate. He was seen 6 months ago. Since that time, they have not become any worse. Summary of old records: N/A    Additional History: N/A    Procedures Today: N/A    Urinalysis today:  No results found for this visit on 03/22/22. Last several PSA's:  Lab Results   Component Value Date    PSA 0.69 07/24/2021    PSA 1.09 11/26/2019    PSA 0.72 10/13/2015     Last total testosterone:  No results found for: TESTOSTERONE    AUA Symptom Score (3/22/2022):                                Last BUN and creatinine:  Lab Results   Component Value Date    BUN 30 (H) 03/02/2022     Lab Results   Component Value Date    CREATININE 1.46 (H) 03/02/2022       Additional Lab/Culture results: none    Imaging Reviewed during this Office Visit: none  (results were independently reviewed by physician and radiology report verified)    PAST MEDICAL, FAMILY AND SOCIAL HISTORY UPDATE:  Past Medical History:   Diagnosis Date    Acute kidney injury superimposed on CKD ([address] 4/10/2013    Acute on chronic congestive heart failure ([address]     Acute on chronic kidney failure ([address] 07/20/2017    Acute on chronic respiratory failure ([address] 10/02/2018    Acute on chronic respiratory failure with hypoxia ([address] 9/30/2021    Adhesive capsulitis of left shoulder 03/25/2017    Anemia, normocytic normochromic 9/12/2021    Anxiety 10/02/2016    smokes marijuana for this  Arthropathy, unspecified, other specified sites 06/13/2013    Asthma     B12 deficiency     Bilateral lower leg cellulitis 02/17/2016    Blood in stool     CAD (coronary artery disease)     Cardiovascular stress test abnormal 2018    Cellulitis of both lower extremities 05/25/2017    Cellulitis of leg, left 07/20/2017    CHF (congestive heart failure), NYHA class III ([facility]) 08/14/2013    Chronic back pain     Chronic bronchitis ([facility])     Chronic headaches     was referred to neuro, testing scheduled    Chronic infective otitis externa 4/28/2017    Chronic kidney disease     Chronic malignant otitis externa of both ears 7/24/2020    Chronic respiratory failure (HCC)     was on vent    Chronic ulcer of left leg, with fat layer exposed ([facility] 02/22/2019    healed    Class 2 severe obesity due to excess calories with serious comorbidity and body mass index (BMI) of 35.0 to 35.9 in adult ([address]     (BMI 35.0-39.9 without comorbidity)    COPD exacerbation ([facility] 11/02/2016    Diabetic neuropathy ([facility] 08/14/2013    Displacement of lumbar intervertebral disc without myelopathy 06/13/2013    Ear infection     RIGHT    Elevated troponin     Essential hypertension     Facial cellulitis 2012    Fall 03/25/2017    GERD (gastroesophageal reflux disease)     Head injury     Hearing loss in right ear     pencil pierced ear as a child    Hepatic steatosis 12/03/2015    History of general anesthesia complication     has woke up during surgery under anesthesia    History of rib fracture 12/03/2015    Chronic     Hyperlipidemia     Hypersomnia     can go multiple days without sleeping    Hypertension     Insomnia     Intolerance of continuous positive airway pressure (CPAP) ventilation 07/20/2017    Iron deficiency     Localized rash     gets frequently in axilla, groin, in any fold, on several topical treatments for this    Lymphedema of both lower extremities 4/27/2021    Magnesium deficiency  Mastoiditis of left side     Mixed conductive and sensorineural hearing loss of both ears 01/10/2017    Per ENT    Mixed type COPD (chronic obstructive pulmonary disease) ([address]     On home O2, multiple inhlaers, nebulizer    Moderate recurrent major depression ([address] 10/02/2016    Morbid obesity with BMI of 45.0-49.9, adult ([address] 06/16/2015    NSTEMI (non-ST elevated myocardial infarction) ([address]     On home oxygen therapy     3 Lpm prn    Open wound of groin 12/19/2018    healed     BARBY on CPAP     Osteoarthritis     Otitis externa of left ear     Pancreatitis chronic     Persistent depressive disorder 11/19/2019    Renal insufficiency     proteinuria    Seizures ([address] 6/27/2019    Severe depression ([address] 09/25/2013    Spinal stenosis of lumbar region without neurogenic claudication 01/06/2016    MRI lumbar 12/30/15 L3-L4: There is broad-based bulging disc which appears protruding left laterally causing flattening of the ventral thecal sac. In addition, there is facet arthropathy with mild hypertrophic changes.  There is borderline central canal stenosis with  evidence of moderate left neural foraminal narrowing and mild right neural foramina narrowing.   L4-L5: There is broad-based protrud    Syncope 04/28/2017    Tinnitus of both ears 01/10/2017    Per ENT    Type 2 diabetes mellitus with stage 3 chronic kidney disease, with long-term current use of insulin ([address] 12/26/2016    due to underlying condition with hyperosmolarity without coma    Type II or unspecified type diabetes mellitus without mention of complication, not stated as uncontrolled     uncontrolled    Uncontrolled type 2 diabetes mellitus with hyperglycemia ([address] 7/15/2013    Unstable angina ([address] 9/11/2021    Vitamin D deficiency     Wears glasses     for reading     Past Surgical History:   Procedure Laterality Date    BACK SURGERY   (x 4) 2000,.12/2011.2/2012     Dr Lottie Sanchez last 2 surg    CARDIAC CATHETERIZATION 2018    PATENT OM STENT    COLONOSCOPY  2015    hemorrhoids, poor prep, not done    COLONOSCOPY      COLONOSCOPY N/A 2021    COLONOSCOPY POLYPECTOMY SNARE/COLD BIOPSY/HOT BIOPSY/CLIP APPLICATION X1 performed by Tera Saldaña MD at [address] [facility]  03/2013    x 1    EYE SURGERY      HAND TENDON SURGERY Left     thumb tendon repair    INTRACAPSULAR CATARACT EXTRACTION Right 2019    EYE CATARACT EMULSIFICATION IOL IMPLANT performed by Raeann Kincaid MD at [facility] Left 2020    EYE CATARACT EMULSIFICATION IOL IMPLANT performed by Raeann Kincaid MD at [facility] ARTHROSCOPY Left     NERVE BLOCK  2015    TENS unit    AL ESOPHAGOGASTRODUODENOSCOPY TRANSORAL DIAGNOSTIC N/A 2018    EGD ESOPHAGOGASTRODUODENOSCOPY performed by Tera Saldaña MD at [address] TYMPANOMASTOIDECTOMY Bilateral 2012    Dr Michelle Roper     Family History   Problem Relation Age of Onset    Heart Disease Mother          age 64 from MI   Kelly High Blood Pressure Mother     Diabetes Mother     High Blood Pressure Father          age 80 from CKD and Lung Fibrosis    Kidney Disease Father     Heart Disease Sister     Heart Attack Sister     Obesity Sister     Diabetes Sister     Asthma Sister     Kidney Disease Sister         Passed      Outpatient Medications Marked as Taking for the 3/22/22 encounter (Office Visit) with Elyssa Bailey MD   Medication Sig Dispense Refill    escitalopram (LEXAPRO) 10 MG tablet       Continuous Blood Gluc Sensor (FREESTYLE CRISTINE 14 DAY SENSOR) MISC USE AS DIRECTED AND CHANGE EVERY 14 DAYS 2 each 5    insulin regular human (HUMULIN R) 500 UNIT/ML concentrated injection vial Patient using 0.60ml with breakfast, 0.55ml with lunch and 0.25 ml with dinner.  60 mL 3    vitamin D3 (CHOLECALCIFEROL) 25 MCG (1000 UT) TABS tablet Take 1 tablet by mouth daily 90 tablet 1    oxyCODONE HCl (OXY-IR) 10 MG immediate release tablet Take 1 tablet by mouth every 8 hours as needed for Pain for up to 30 days. 90 tablet 0    nystatin (MYCOSTATIN) 950019 UNIT/GM powder Apply 2 times daily in the skin folds for long term. 60 g 3    cephALEXin (KEFLEX) 500 MG capsule Take 1 capsule by mouth 4 times daily 40 capsule 0    chlorhexidine (HIBICLENS) 4 % external liquid Use once or twice a day to clean the left armpit, diluted in water and wash the left armpit with it 946 mL 3    lisinopril (PRINIVIL;ZESTRIL) 2.5 MG tablet Take 1 tablet by mouth daily Dose decreased 3/2/2022 90 tablet 0    venlafaxine (EFFEXOR XR) 150 MG extended release capsule Take 1 capsule by mouth every morning Dose increased 3/2/2022 90 capsule 2    naloxone 4 MG/0.1ML LIQD nasal spray 1 spray by Nasal route as needed for Opioid Reversal Patient needs counseling 1 each 3    gabapentin (NEURONTIN) 300 MG capsule TAKE ONE CAPSULE BY MOUTH THREE TIMES PER DAY 90 capsule 2    butalbital-acetaminophen-caffeine (FIORICET, ESGIC) -40 MG per tablet Take 1 tablet by mouth every 8 hours as needed for Headaches 60 tablet 1    MAGNESIUM-OXIDE 400 (241.3 Mg) MG TABS tablet Take 1 tablet by mouth 2 times daily Frequency increased to 10/20/2022 180 tablet 3    pantoprazole (PROTONIX) 40 MG tablet Take 1 tablet by mouth every morning (before breakfast) 90 tablet 1    nystatin (MYCOSTATIN) 704589 UNIT/ML suspension Take 5 mLs by mouth 4 times daily Swish and swallow 240 mL 0    albuterol (PROVENTIL) (2.5 MG/3ML) 0.083% nebulizer solution USE THREE MILLILITERS VIA NEBULIZATION BY MOUTH EVERY 6 HOURS AS NEEDED FOR WHEEZING OR FOR SHORTNESS OF BREATH 360 mL 0    tiZANidine (ZANAFLEX) 4 MG tablet Take 1 tablet by mouth every 8 hours as needed (Muscle spasms) 90 tablet 3    Blood Pressure KIT Diagnosis: HTN. Needs to check blood pressure 1-2 times a day until stable, then once a day.  Goal blood pressure less than 135/85, and above 110/60. 1 kit 0    allopurinol (ZYLOPRIM) 300 MG tablet Take 1 tablet by mouth daily Dose increased 12/9/2021 . Stop if any rash develops 90 tablet 1    FEROSUL 325 (65 Fe) MG tablet TAKE ONE TABLET BY MOUTH DAILY 90 tablet 3    SPIRIVA RESPIMAT 2.5 MCG/ACT AERS inhaler INHALE TWO PUFFS BY MOUTH DAILY 4 g 5    ADVAIR -21 MCG/ACT inhaler INHALE TWO PUFFS BY MOUTH TWICE A DAY 36 g 5    metoprolol tartrate (LOPRESSOR) 50 MG tablet Take 1 tablet by mouth 2 times daily 180 tablet 3    MUCUS RELIEF 600 MG extended release tablet       amitriptyline (ELAVIL) 50 MG tablet Take 1 po qhs 30 tablet 5    blood glucose monitor strips Test 3 times a day & as needed for symptoms of irregular blood glucose. Dispense sufficient amount for indicated testing frequency plus additional to accommodate PRN testing needs. One touch Ultra blue 300 strip 3    bumetanide (BUMEX) 1 MG tablet Take 1 tablet by mouth 2 times daily Dose decreased 10/23/2021 (Patient taking differently: Take 2 mg by mouth 2 times daily Dose decreased 10/23/2021) 180 tablet 0    ammonium lactate (LAC-HYDRIN) 12 % lotion Apply topically daily. 222 mL 0    ROCKLATAN 0.02-0.005 % SOLN       Insulin Syringe-Needle U-100 31G X 5/16\" 1 ML MISC Use to subcutaneously inject insulin three times daily 300 each 2    docusate sodium (COLACE) 100 MG capsule Take 1 capsule by mouth 2 times daily For constipation 180 capsule 3    latanoprost (XALATAN) 0.005 % ophthalmic solution 1 drop nightly      clopidogrel (PLAVIX) 75 MG tablet Take 1 tablet by mouth daily 90 tablet 3    nitroGLYCERIN (NITROSTAT) 0.4 MG SL tablet DISSOLVE 1 TAB UNDER TONGUE FOR CHEST PAIN - IF PAIN REMAINS AFTER 5 MIN, CALL 911 AND REPEAT DOSE.  MAX 3 TABS IN 15 MINUTES 25 tablet 2    rosuvastatin (CRESTOR) 10 MG tablet Take 1 tablet by mouth nightly Stop pravastatin 90 tablet 3    PROAIR  (90 Base) MCG/ACT inhaler INHALE TWO PUFFS BY MOUTH EVERY 6 HOURS AS NEEDED FOR WHEEZING OR FOR SHORTNESS OF BREATH 8.5 g 3    clobetasol (TEMOVATE) 0.05 % ointment Apply topically 2 times daily for psoriasis 1 Tube 3    ketoconazole (NIZORAL) 2 % cream Apply twice a day for yeast infection in the skin folds, for 4 weeks 1 Tube 3    Lancets MISC Use to check blood sugar three times daily along with when necessary due to symptoms. 300 each 2    vitamin B-12 (CYANOCOBALAMIN) 500 MCG tablet Take 1 tablet by mouth daily 90 tablet 3    Oxygen Tubing MISC by Does not apply route DX COPD. chronic respiratory failure 1 each 0    Respiratory Therapy Supplies (NEBULIZER/TUBING/MOUTHPIECE) KIT Dx COPD needs nebulizer supplies 1 kit 11    ONE TOUCH ULTRASOFT LANCETS MISC Patient to test blood sugar up to 4 times daily. 300 each 3    Lancet Devices (LANCING DEVICE) MISC Provide patient with lancing device appropriate for his machine/lancing needles. 1 each 1    Handicap Placard [facility] by Does not apply route Can't walk greater than 200 feet. Expires in 5 years. 1 each 0    fluticasone (CUTIVATE) 0.05 % cream Apply topically 2 times daily       fluticasone (FLONASE) 50 MCG/ACT nasal spray 2 sprays by Nasal route daily (Patient taking differently: 2 sprays by Nasal route daily as needed (sinus symptoms) ) 1 Bottle 3    Melatonin 10 MG TABS Take 10 mg by mouth nightly as needed (insomnia) 90 tablet 1    aspirin 81 MG EC tablet Take 81 mg by mouth daily.          Levofloxacin, Lorazepam, Nsaids, Prozac [fluoxetine hcl], and Vancomycin  Social History     Tobacco Use   Smoking Status Former Smoker    Packs/day: 0.25    Years: 33.00    Pack years: 8.25    Types: Cigarettes    Start date: 1985   [facility] Quit date: 2020    Years since quittin.3   Smokeless Tobacco Former User    Types: Snuff    Quit date: 1995     (Ifpatient a smoker, smoking cessation counseling offered)    Social History     Substance and Sexual Activity   Alcohol Use No    Alcohol/week: 0.0 standard drinks       REVIEW OF SYSTEMS:  Review of Systems    Physical Exam:      Vitals:    03/22/22 0953   BP: 128/80   Temp: 97.2 °F (36.2 °C)     Body mass index is 56.96 kg/m². Patient is a 61 y.o. male in no acute distress and alert and oriented to person, place and time. Physical Exam  Constitutional: Patient in no acute distress. Neuro: Alert and oriented to person, place and time. Lungs: Respiratory effort is normal  Cardiovascular: Warm & Pink  Abdomen: Soft, non-tender, non-distended with no CVA,  No flank tenderness,  Or hepatosplenomegaly   Lymphatics: No palpablelymphadenopathy. Bladder non-tender and not distended. Musculoskeletal: Normal gait and station  Scrotal swelling noted. Mild hydrocels noted. Assessment and Plan      1. Encysted hydrocele    2. Edema of scrotum           Plan:          Scrotal exam is unchanged. Has scrotal edema and hydroceles. He is not a good surgical candidate, and I think more of the issues is edema, and nt purely hydrocele. No intervention at this time. F/u as needed. Return if symptoms worsen or fail to improve. Prescriptions Ordered:  No orders of the defined types were placed in this encounter. Orders Placed:  No orders of the defined types were placed in this encounter. Elyssa Bailey MD    Agree with the ROS entered by the MA.

## 2022-03-25 ENCOUNTER — PATIENT MESSAGE (OUTPATIENT)
Dept: FAMILY MEDICINE CLINIC | Age: 57
End: 2022-03-25

## 2022-03-26 DIAGNOSIS — I50.32 CHRONIC DIASTOLIC CONGESTIVE HEART FAILURE (HCC): ICD-10-CM

## 2022-03-28 RX ORDER — SPIRONOLACTONE 50 MG/1
TABLET, FILM COATED ORAL
Qty: 90 TABLET | Refills: 3 | OUTPATIENT
Start: 2022-03-28

## 2022-03-28 NOTE — TELEPHONE ENCOUNTER
From: Hari Dowell.   To: Dr. Aly Alejo: 3/25/2022 5:12 PM EDT  Subject: Meds     Is there a reason why you dont refill my spironolactone 50 mg tablet one a day

## 2022-03-28 NOTE — TELEPHONE ENCOUNTER
TIMMY  I answered In my chart to the patient, this medication was stopped on October when he was admitted due to worsening kidney function and high potassium level  I also discussed with him at the last appointment what was stopped, we restarted a small dosage of lisinopril which is in better interest for him    I did answer the patient via [employer] this morning

## 2022-03-29 ENCOUNTER — APPOINTMENT (OUTPATIENT)
Dept: PHARMACY | Age: 57
End: 2022-03-29
Payer: COMMERCIAL

## 2022-03-29 ENCOUNTER — TELEPHONE (OUTPATIENT)
Dept: PHARMACY | Age: 57
End: 2022-03-29

## 2022-03-29 NOTE — TELEPHONE ENCOUNTER
Elvin Boss MD 23 hours ago (8:14 AM)           What blood pressure medicine is Miguel suppose to be taking then .          Ramon Silva

## 2022-03-31 ENCOUNTER — PATIENT MESSAGE (OUTPATIENT)
Dept: FAMILY MEDICINE CLINIC | Age: 57
End: 2022-03-31

## 2022-03-31 DIAGNOSIS — M48.061 SPINAL STENOSIS OF LUMBAR REGION WITHOUT NEUROGENIC CLAUDICATION: ICD-10-CM

## 2022-03-31 DIAGNOSIS — M54.42 CHRONIC MIDLINE LOW BACK PAIN WITH LEFT-SIDED SCIATICA: ICD-10-CM

## 2022-03-31 DIAGNOSIS — M51.36 DDD (DEGENERATIVE DISC DISEASE), LUMBAR: ICD-10-CM

## 2022-03-31 DIAGNOSIS — M96.1 POSTLAMINECTOMY SYNDROME OF LUMBAR REGION: ICD-10-CM

## 2022-03-31 DIAGNOSIS — G89.29 CHRONIC MIDLINE LOW BACK PAIN WITH LEFT-SIDED SCIATICA: ICD-10-CM

## 2022-03-31 RX ORDER — OXYCODONE HYDROCHLORIDE 10 MG/1
10 TABLET ORAL EVERY 8 HOURS PRN
Qty: 90 TABLET | Refills: 0 | Status: SHIPPED | OUTPATIENT
Start: 2022-03-31 | End: 2022-04-28 | Stop reason: SDUPTHER

## 2022-03-31 NOTE — TELEPHONE ENCOUNTER
From: Maddie Crum.   To: Dr. Valentina Mosquera: 3/31/2022 11:40 AM EDT  Subject: Meds     Will you please refill my oxyCODONE HCL 10 mg TABLET one every 8 hours Thank you have a good day

## 2022-04-07 ENCOUNTER — HOSPITAL ENCOUNTER (OUTPATIENT)
Dept: OTHER | Age: 57
Discharge: HOME | End: 2022-04-07
Payer: COMMERCIAL

## 2022-04-07 ENCOUNTER — HOSPITAL ENCOUNTER (OUTPATIENT)
Age: 57
Setting detail: SPECIMEN
Discharge: HOME | End: 2022-04-07
Payer: COMMERCIAL

## 2022-04-07 VITALS
RESPIRATION RATE: 22 BRPM | BODY MASS INDEX: 56.85 KG/M2 | HEART RATE: 84 BPM | WEIGHT: 315 LBS | SYSTOLIC BLOOD PRESSURE: 118 MMHG | DIASTOLIC BLOOD PRESSURE: 64 MMHG | OXYGEN SATURATION: 94 %

## 2022-04-07 LAB
ANION GAP SERPL CALCULATED.3IONS-SCNC: 13 MMOL/L (ref 9–17)
BUN BLDV-MCNC: 22 MG/DL (ref 6–20)
CALCIUM SERPL-MCNC: 9.4 MG/DL (ref 8.6–10.4)
CHLORIDE BLD-SCNC: 98 MMOL/L (ref 98–107)
CO2: 27 MMOL/L (ref 20–31)
CREAT SERPL-MCNC: 1.46 MG/DL (ref 0.7–1.2)
GFR AFRICAN AMERICAN: >60 ML/MIN
GFR NON-AFRICAN AMERICAN: 50 ML/MIN
GFR SERPL CREATININE-BSD FRML MDRD: ABNORMAL ML/MIN/{1.73_M2}
GLUCOSE BLD-MCNC: 220 MG/DL (ref 70–99)
POTASSIUM SERPL-SCNC: 3.9 MMOL/L (ref 3.7–5.3)
PRO-BNP: 140 PG/ML
SODIUM BLD-SCNC: 138 MMOL/L (ref 135–144)

## 2022-04-07 PROCEDURE — 99211 OFF/OP EST MAY X REQ PHY/QHP: CPT

## 2022-04-07 PROCEDURE — 36415 COLL VENOUS BLD VENIPUNCTURE: CPT

## 2022-04-07 PROCEDURE — 80048 BASIC METABOLIC PNL TOTAL CA: CPT

## 2022-04-07 PROCEDURE — 83880 ASSAY OF NATRIURETIC PEPTIDE: CPT

## 2022-04-07 NOTE — RESULT ENCOUNTER NOTE
Please notify patient: Blood glucose very high 220, to stop drinking pop    Chronic kidney disease stage III stable  Congestive heart failure is stable not flaring up    Did she schedule his eye exam?    Please asked the patient if he can come as a nurse visit for Pneumovax vaccine needs [id number]    Future Appointments  4/14/2022  1:30 PM    [facility] MEDICATION MGMT       [facility] MED MGMT        [facility]  5/5/2022   1:30 PM    Marbin Pina MD      [address]        [place]  6/7/2022   11:30 AM   [facility] CHF CLINIC  Sury Elias [id number]  3/7/2023   9:00 AM    Hallie Barahona MD     [facility]

## 2022-04-07 NOTE — PROGRESS NOTES
Weight down one lb to 419.2 lbs. Pt states he feels OK, no change in assessment. On RA and 94%, pt states he is having problems getting home O2. BNP down to 140 from 177. Aldactone was discontinued and potassium went from 4.3 to 3.9. pt states he is following a low sodium diet and does not add salt to any of his food. Daughter present, labs reviewed and next appointment scheduled for June 7 2022 at 11:30. Will fax order for repeat labs. Verbally reviewed importance of medication compliance with patient; patient verbalized understanding. Discussed 2000mg/day sodium restricted diet; patient verbalized understanding. Moderate daily exercise encouraged as tolerated. Discussed rest breaks as needed; patient verbalized understanding. Patient instructed to weigh self at the same time of each day, using same clothes and same scale; reinforced teaching to monitor for 3-5 lb weight increase over 1-2 days, and to notify the CHF clinic at [phone] or physician office if weight change noted. Patient verbalized understanding. Risks of smoking discussed with the patient if applicable; patient strongly discouraged to smoke. Patient verbalized understanding. Signs and symptoms of CHF discussed with patient, such as feeling more tired than normal, feeling short of breath, coughing that increases when you lie down, sudden weight gain, swelling of your feet, legs or belly. Patient verbalized understanding to notify the CHF clinic at [phone] or physician office if these symptoms occur. Compliance with plan of care and further disease process causes discussed with patient, patient encouraged to keep all follow up appointments. Patient verbalized understanding.

## 2022-04-12 ENCOUNTER — PATIENT MESSAGE (OUTPATIENT)
Dept: FAMILY MEDICINE CLINIC | Age: 57
End: 2022-04-12

## 2022-04-12 DIAGNOSIS — J44.9 CHRONIC OBSTRUCTIVE PULMONARY DISEASE, UNSPECIFIED COPD TYPE (HCC): ICD-10-CM

## 2022-04-12 RX ORDER — ALBUTEROL SULFATE 2.5 MG/3ML
SOLUTION RESPIRATORY (INHALATION)
Qty: 360 ML | Refills: 0 | Status: SHIPPED | OUTPATIENT
Start: 2022-04-12 | End: 2022-07-11

## 2022-04-12 NOTE — TELEPHONE ENCOUNTER
From: Marisol Garcia.   To: Dr. Bernice Prieto: 4/12/2022 10:41 AM EDT  Subject: Refill     I need my albuterol 0.083% inh sol for my nebulizer machine

## 2022-04-14 ENCOUNTER — TELEPHONE (OUTPATIENT)
Dept: PHARMACY | Age: 57
End: 2022-04-14

## 2022-04-14 NOTE — TELEPHONE ENCOUNTER
Patient's daughter calls to say patient is not feeling well with stomach upset/pain and unable to make appt today. Will call back to reschedule. Yesenia Rizvi RPH,Pharm. D,, [facility], [facility]  4/14/2022  5:22 PM

## 2022-04-19 ENCOUNTER — OFFICE VISIT (OUTPATIENT)
Dept: FAMILY MEDICINE CLINIC | Age: 57
End: 2022-04-19
Payer: COMMERCIAL

## 2022-04-19 VITALS
TEMPERATURE: 97.2 F | OXYGEN SATURATION: 95 % | HEART RATE: 78 BPM | DIASTOLIC BLOOD PRESSURE: 76 MMHG | HEIGHT: 72 IN | BODY MASS INDEX: 42.66 KG/M2 | WEIGHT: 315 LBS | SYSTOLIC BLOOD PRESSURE: 130 MMHG

## 2022-04-19 DIAGNOSIS — E11.42 TYPE 2 DIABETES MELLITUS WITH DIABETIC POLYNEUROPATHY, WITH LONG-TERM CURRENT USE OF INSULIN (HCC): ICD-10-CM

## 2022-04-19 DIAGNOSIS — L97.515 DIABETIC ULCER OF TOE OF RIGHT FOOT ASSOCIATED WITH TYPE 2 DIABETES MELLITUS, WITH MUSCLE INVOLVEMENT WITHOUT EVIDENCE OF NECROSIS (HCC): Primary | ICD-10-CM

## 2022-04-19 DIAGNOSIS — Z79.4 TYPE 2 DIABETES MELLITUS WITH DIABETIC POLYNEUROPATHY, WITH LONG-TERM CURRENT USE OF INSULIN (HCC): ICD-10-CM

## 2022-04-19 DIAGNOSIS — L03.90 WOUND CELLULITIS: ICD-10-CM

## 2022-04-19 DIAGNOSIS — E11.621 DIABETIC ULCER OF TOE OF RIGHT FOOT ASSOCIATED WITH TYPE 2 DIABETES MELLITUS, WITH MUSCLE INVOLVEMENT WITHOUT EVIDENCE OF NECROSIS (HCC): Primary | ICD-10-CM

## 2022-04-19 PROCEDURE — G8417 CALC BMI ABV UP PARAM F/U: HCPCS | Performed by: FAMILY MEDICINE

## 2022-04-19 PROCEDURE — 3017F COLORECTAL CA SCREEN DOC REV: CPT | Performed by: FAMILY MEDICINE

## 2022-04-19 PROCEDURE — 1036F TOBACCO NON-USER: CPT | Performed by: FAMILY MEDICINE

## 2022-04-19 PROCEDURE — 3052F HG A1C>EQUAL 8.0%<EQUAL 9.0%: CPT | Performed by: FAMILY MEDICINE

## 2022-04-19 PROCEDURE — G8427 DOCREV CUR MEDS BY ELIG CLIN: HCPCS | Performed by: FAMILY MEDICINE

## 2022-04-19 PROCEDURE — 99214 OFFICE O/P EST MOD 30 MIN: CPT | Performed by: FAMILY MEDICINE

## 2022-04-19 PROCEDURE — 2022F DILAT RTA XM EVC RTNOPTHY: CPT | Performed by: FAMILY MEDICINE

## 2022-04-19 RX ORDER — DOXYCYCLINE 100 MG/1
100 TABLET ORAL 2 TIMES DAILY
Qty: 20 TABLET | Refills: 0 | Status: SHIPPED | OUTPATIENT
Start: 2022-04-19 | End: 2022-05-05

## 2022-04-19 RX ORDER — MUPIROCIN CALCIUM 20 MG/G
CREAM TOPICAL
Qty: 1 EACH | Refills: 1 | Status: SHIPPED | OUTPATIENT
Start: 2022-04-19 | End: 2022-05-19

## 2022-04-19 ASSESSMENT — ENCOUNTER SYMPTOMS
RHINORRHEA: 0
COLOR CHANGE: 0
EYE REDNESS: 0
ABDOMINAL PAIN: 0
DIARRHEA: 0
BLOOD IN STOOL: 0
ABDOMINAL DISTENTION: 0
BACK PAIN: 0
CHEST TIGHTNESS: 0
CONSTIPATION: 0
COUGH: 0
SHORTNESS OF BREATH: 0

## 2022-04-19 NOTE — PROGRESS NOTES
Chief Complaint   Patient presents with    Wound Check     CUT RIGHT HALLUX TOE THIS PAST SATURDAY         Clinton R Myrtle Landau.  here today for follow up on chronic medical problems, go over labs and/or diagnostic studies, and medication refills. Wound Check (CUT RIGHT HALLUX TOE THIS PAST SATURDAY)      HPI: Patient is scheduled for same-day appointment has noticed wound on right foot. Patient reports on Sunday and noticed wound. He has diabetic neuropathy with uncontrolled diabetes. Patient is on insulin reports sugars have improved follows with Dr. Lamberto Crawford. The wound is red with skin off with mucopurulent discharge. Patient follows with podiatrist has seen him 3 months before. He denies any previous history of diabetic foot ulcer. He denies any fever chills. /76   Pulse 78   Temp 97.2 °F (36.2 °C)   Ht 6' (1.829 m)   Wt (!) 419 lb (190.1 kg)   SpO2 95%   BMI 56.83 kg/m²    Body mass index is 56.83 kg/m². Wt Readings from Last 3 Encounters:   04/19/22 (!) 419 lb (190.1 kg)   04/07/22 (!) 419 lb 3.2 oz (190.1 kg)   03/22/22 (!) 420 lb (190.5 kg)        []Negative depression screening. PHQ Scores 3/2/2022 2/28/2022 2/23/2022 12/15/2021 7/20/2021 4/23/2021 2/23/2021   PHQ2 Score 4 5 0 0 0 6 0   PHQ9 Score 10 10 0 0 0 13 0      []1-4 = Minimal depression   []5-9 = Milddepression   [x]10-14 = Moderate depression   []15-19 = Moderately severe depression   []20-27 = Severe depression    Discussed testing with the patient and all questions fully answered.     Hospital Outpatient Visit on 04/07/2022   Component Date Value Ref Range Status    Glucose 04/07/2022 220* 70 - 99 mg/dL Final    BUN 04/07/2022 22* 6 - 20 mg/dL Final    CREATININE 04/07/2022 1.46* 0.70 - 1.20 mg/dL Final    Calcium 04/07/2022 9.4  8.6 - 10.4 mg/dL Final    Sodium 04/07/2022 138  135 - 144 mmol/L Final    Potassium 04/07/2022 3.9  3.7 - 5.3 mmol/L Final    Chloride 04/07/2022 98  98 - 107 mmol/L Final    CO2 04/07/2022 27  20 - 31 mmol/L Final    Anion Gap 04/07/2022 13  9 - 17 mmol/L Final    GFR Non- 04/07/2022 50* >60 mL/min Final    GFR  04/07/2022 >60  >60 mL/min Final    GFR Comment 04/07/2022        Final    Comment: Average GFR for 52-63 years old:   80 mL/min/1.73sq m  Chronic Kidney Disease:   <60 mL/min/1.73sq m  Kidney failure:   <15 mL/min/1.73sq m              eGFR calculated using average adult body mass. Additional eGFR calculator available at:        [employer]            Pro-BNP 04/07/2022 140  <300 pg/mL Final    Comment:       An age-independent cutoff point of 300 pg/ml has a 98% negative predictive value excluding   acute heart failure.            Most recent labs reviewed:     Lab Results   Component Value Date    WBC 10.2 02/11/2022    HGB 10.7 (L) 02/11/2022    HCT 38.2 (L) 02/11/2022    MCV 91.6 02/11/2022     02/11/2022       @BRIEFLAB(NA,K,CL,CO2,BUN,CREATININE,GLUCOSE,CALCIUM)@     Lab Results   Component Value Date    ALT 11 02/10/2022    AST 14 02/10/2022    ALKPHOS 90 02/10/2022    BILITOT 0.33 02/10/2022       Lab Results   Component Value Date    TSH 0.40 02/28/2022       Lab Results   Component Value Date    CHOL 137 09/12/2021    CHOL 121 07/24/2021    CHOL 151 09/25/2020     Lab Results   Component Value Date    TRIG 416 (H) 09/12/2021    TRIG 213 (H) 07/24/2021    TRIG 180 (H) 09/25/2020     Lab Results   Component Value Date    HDL 30 (L) 09/12/2021    HDL 32 (L) 07/24/2021    HDL 33 (L) 09/25/2020     Lab Results   Component Value Date    LDLCHOLESTEROL      09/12/2021    LDLCHOLESTEROL 46 07/24/2021    LDLCHOLESTEROL 82 09/25/2020     Lab Results   Component Value Date    VLDL NOT REPORTED 09/12/2021    VLDL NOT REPORTED (H) 07/24/2021    VLDL NOT REPORTED (H) 09/25/2020     Lab Results   Component Value Date    CHOLHDLRATIO 4.6 09/12/2021    CHOLHDLRATIO 3.8 07/24/2021    CHOLHDLRATIO 4.6 09/25/2020 Lab Results   Component Value Date    LABA1C 8.8 02/24/2022    LABA1C 8.8 02/24/2022       Lab Results   Component Value Date    MWBDIZHV15 1006 02/28/2022       Lab Results   Component Value Date    FOLATE 13.8 02/28/2022       Lab Results   Component Value Date    IRON 72 10/02/2021    TIBC 266 10/02/2021    FERRITIN 669 (H) 10/02/2021       Lab Results   Component Value Date    VITD25 38.7 02/28/2022             Current Outpatient Medications   Medication Sig Dispense Refill    doxycycline monohydrate (ADOXA) 100 MG tablet Take 1 tablet by mouth 2 times daily With food 20 tablet 0    mupirocin (BACTROBAN) 2 % cream Apply 3 times daily. 1 each 1    albuterol (PROVENTIL) (2.5 MG/3ML) 0.083% nebulizer solution USE THREE MILLILITERS VIA NEBULIZATION BY MOUTH EVERY 6 HOURS AS NEEDED FOR WHEEZING OR FOR SHORTNESS OF BREATH 360 mL 0    oxyCODONE HCl (OXY-IR) 10 MG immediate release tablet Take 1 tablet by mouth every 8 hours as needed for Pain for up to 30 days. 90 tablet 0    escitalopram (LEXAPRO) 10 MG tablet       Continuous Blood Gluc Sensor (FREESTYLE CRISTINE 14 DAY SENSOR) MISC USE AS DIRECTED AND CHANGE EVERY 14 DAYS 2 each 5    insulin regular human (HUMULIN R) 500 UNIT/ML concentrated injection vial Patient using 0.60ml with breakfast, 0.55ml with lunch and 0.25 ml with dinner. 60 mL 3    vitamin D3 (CHOLECALCIFEROL) 25 MCG (1000 UT) TABS tablet Take 1 tablet by mouth daily 90 tablet 1    nystatin (MYCOSTATIN) 350107 UNIT/GM powder Apply 2 times daily in the skin folds for long term.  60 g 3    chlorhexidine (HIBICLENS) 4 % external liquid Use once or twice a day to clean the left armpit, diluted in water and wash the left armpit with it 946 mL 3    lisinopril (PRINIVIL;ZESTRIL) 2.5 MG tablet Take 1 tablet by mouth daily Dose decreased 3/2/2022 90 tablet 0    venlafaxine (EFFEXOR XR) 150 MG extended release capsule Take 1 capsule by mouth every morning Dose increased 3/2/2022 90 capsule 2    naloxone 4 MG/0.1ML LIQD nasal spray 1 spray by Nasal route as needed for Opioid Reversal Patient needs counseling 1 each 3    gabapentin (NEURONTIN) 300 MG capsule TAKE ONE CAPSULE BY MOUTH THREE TIMES PER DAY 90 capsule 2    butalbital-acetaminophen-caffeine (FIORICET, ESGIC) -40 MG per tablet Take 1 tablet by mouth every 8 hours as needed for Headaches 60 tablet 1    MAGNESIUM-OXIDE 400 (241.3 Mg) MG TABS tablet Take 1 tablet by mouth 2 times daily Frequency increased to 10/20/2022 180 tablet 3    pantoprazole (PROTONIX) 40 MG tablet Take 1 tablet by mouth every morning (before breakfast) 90 tablet 1    nystatin (MYCOSTATIN) 985069 UNIT/ML suspension Take 5 mLs by mouth 4 times daily Swish and swallow 240 mL 0    tiZANidine (ZANAFLEX) 4 MG tablet Take 1 tablet by mouth every 8 hours as needed (Muscle spasms) 90 tablet 3    Blood Pressure KIT Diagnosis: HTN. Needs to check blood pressure 1-2 times a day until stable, then once a day. Goal blood pressure less than 135/85, and above 110/60. 1 kit 0    allopurinol (ZYLOPRIM) 300 MG tablet Take 1 tablet by mouth daily Dose increased 12/9/2021 . Stop if any rash develops 90 tablet 1    FEROSUL 325 (65 Fe) MG tablet TAKE ONE TABLET BY MOUTH DAILY 90 tablet 3    SPIRIVA RESPIMAT 2.5 MCG/ACT AERS inhaler INHALE TWO PUFFS BY MOUTH DAILY 4 g 5    ADVAIR -21 MCG/ACT inhaler INHALE TWO PUFFS BY MOUTH TWICE A DAY 36 g 5    metoprolol tartrate (LOPRESSOR) 50 MG tablet Take 1 tablet by mouth 2 times daily 180 tablet 3    MUCUS RELIEF 600 MG extended release tablet       amitriptyline (ELAVIL) 50 MG tablet Take 1 po qhs 30 tablet 5    blood glucose monitor strips Test 3 times a day & as needed for symptoms of irregular blood glucose. Dispense sufficient amount for indicated testing frequency plus additional to accommodate PRN testing needs.  One touch Ultra blue 300 strip 3    bumetanide (BUMEX) 1 MG tablet Take 1 tablet by mouth 2 times daily Dose decreased 10/23/2021 (Patient taking differently: Take 2 mg by mouth 2 times daily Dose decreased 10/23/2021) 180 tablet 0    ammonium lactate (LAC-HYDRIN) 12 % lotion Apply topically daily. 222 mL 0    ROCKLATAN 0.02-0.005 % SOLN       Insulin Syringe-Needle U-100 31G X 5/16\" 1 ML MISC Use to subcutaneously inject insulin three times daily 300 each 2    docusate sodium (COLACE) 100 MG capsule Take 1 capsule by mouth 2 times daily For constipation 180 capsule 3    latanoprost (XALATAN) 0.005 % ophthalmic solution 1 drop nightly      clopidogrel (PLAVIX) 75 MG tablet Take 1 tablet by mouth daily 90 tablet 3    nitroGLYCERIN (NITROSTAT) 0.4 MG SL tablet DISSOLVE 1 TAB UNDER TONGUE FOR CHEST PAIN - IF PAIN REMAINS AFTER 5 MIN, CALL 911 AND REPEAT DOSE. MAX 3 TABS IN 15 MINUTES 25 tablet 2    rosuvastatin (CRESTOR) 10 MG tablet Take 1 tablet by mouth nightly Stop pravastatin 90 tablet 3    PROAIR  (90 Base) MCG/ACT inhaler INHALE TWO PUFFS BY MOUTH EVERY 6 HOURS AS NEEDED FOR WHEEZING OR FOR SHORTNESS OF BREATH 8.5 g 3    clobetasol (TEMOVATE) 0.05 % ointment Apply topically 2 times daily for psoriasis 1 Tube 3    ketoconazole (NIZORAL) 2 % cream Apply twice a day for yeast infection in the skin folds, for 4 weeks 1 Tube 3    Lancets MISC Use to check blood sugar three times daily along with when necessary due to symptoms. 300 each 2    vitamin B-12 (CYANOCOBALAMIN) 500 MCG tablet Take 1 tablet by mouth daily 90 tablet 3    Oxygen Tubing MISC by Does not apply route DX COPD. chronic respiratory failure 1 each 0    Respiratory Therapy Supplies (NEBULIZER/TUBING/MOUTHPIECE) KIT Dx COPD needs nebulizer supplies 1 kit 11    ONE TOUCH ULTRASOFT LANCETS MISC Patient to test blood sugar up to 4 times daily. 300 each 3    Lancet Devices (LANCING DEVICE) MISC Provide patient with lancing device appropriate for his machine/lancing needles.  1 each 1    Handicap Placard [facility] by Does not apply route Can't Food in the Last Year: Never true   Transportation Needs: No Transportation Needs    Lack of Transportation (Medical): No    Lack of Transportation (Non-Medical): No   Physical Activity: Inactive    Days of Exercise per Week: 0 days    Minutes of Exercise per Session: 0 min   Stress:     Feeling of Stress : Not on file   Social Connections:     Frequency of Communication with Friends and Family: Not on file    Frequency of Social Gatherings with Friends and Family: Not on file    Attends Jain Services: Not on file    Active Member of Clubs or Organizations: Not on file    Attends Club or Organization Meetings: Not on file    Marital Status: Not on file   Intimate Partner Violence:     Fear of Current or Ex-Partner: Not on file    Emotionally Abused: Not on file    Physically Abused: Not on file    Sexually Abused: Not on file   Housing Stability: Unknown    Unable to Pay for Housing in the Last Year: No    Number of [place] in the Last Year: Not on file    Unstable Housing in the Last Year: No     Counseling given: Not Answered        Family History   Problem Relation Age of Onset    Heart Disease Mother          age 64 from MI    High Blood Pressure Mother     Diabetes Mother     High Blood Pressure Father          age 80 from CKD and Lung Fibrosis    Kidney Disease Father     Heart Disease Sister     Heart Attack Sister     Obesity Sister     Diabetes Sister     Asthma Sister     Kidney Disease Sister         Passed              -rest of complaints with corresponding details per ROS    The patient's past medical, surgical, social, and family history as well as his current medications and allergies were reviewed as documented intoday's encounter. Review of Systems   Constitutional: Positive for activity change. Negative for appetite change, fatigue, fever and unexpected weight change.    HENT: Negative for congestion, ear pain and rhinorrhea. Eyes: Negative for redness and visual disturbance. Respiratory: Negative for cough, chest tightness and shortness of breath. Cardiovascular: Negative for chest pain and leg swelling. Gastrointestinal: Negative for abdominal distention, abdominal pain, blood in stool, constipation and diarrhea. Endocrine: Negative for polyuria. Genitourinary: Negative for difficulty urinating, flank pain, frequency and urgency. Musculoskeletal: Positive for arthralgias. Negative for back pain, gait problem, myalgias and neck stiffness. Skin: Negative for color change, rash and wound. Allergic/Immunologic: Negative for food allergies and immunocompromised state. Neurological: Negative for dizziness, speech difficulty, weakness, light-headedness, numbness and headaches. Psychiatric/Behavioral: Negative for agitation, behavioral problems, decreased concentration, dysphoric mood, hallucinations, sleep disturbance and suicidal ideas. The patient is nervous/anxious. Physical Exam  Vitals and nursing note reviewed. Constitutional:       General: He is not in acute distress. Appearance: Normal appearance. He is well-developed. He is obese. He is not diaphoretic. HENT:      Head: Normocephalic and atraumatic. Nose: Nose normal.   Eyes:      General:         Right eye: No discharge. Left eye: No discharge. Extraocular Movements: Extraocular movements intact. Conjunctiva/sclera: Conjunctivae normal.      Pupils: Pupils are equal, round, and reactive to light. Neck:      Thyroid: No thyromegaly. Cardiovascular:      Rate and Rhythm: Normal rate and regular rhythm. Pulses:           Dorsalis pedis pulses are 2+ on the right side and 2+ on the left side. Heart sounds: Normal heart sounds. No murmur heard. Pulmonary:      Effort: Pulmonary effort is normal. No respiratory distress. Breath sounds: Normal breath sounds. No wheezing or rhonchi. Abdominal:      General: Bowel sounds are normal. There is no distension. Palpations: Abdomen is soft. There is no mass. Tenderness: There is no abdominal tenderness. Musculoskeletal:      Cervical back: Normal range of motion and neck supple. Spasms present. No rigidity. Thoracic back: No spasms. Normal range of motion. Lumbar back: Deformity, spasms and tenderness present. No bony tenderness. Decreased range of motion. Right foot: Decreased range of motion. Deformity present. No prominent metatarsal heads. Normal pulse. Left foot: No prominent metatarsal heads. Feet:      Right foot:      Skin integrity: Ulcer, skin breakdown, erythema, callus and dry skin present. Toenail Condition: Right toenails are abnormally thick. Left foot:      Toenail Condition: Left toenails are abnormally thick. Lymphadenopathy:      Cervical: No cervical adenopathy. Skin:     Coloration: Skin is not jaundiced or pale. Findings: No bruising, erythema or rash. Neurological:      General: No focal deficit present. Mental Status: He is alert and oriented to person, place, and time. Cranial Nerves: No cranial nerve deficit. Sensory: Sensory deficit present. Motor: No weakness or tremor. Coordination: Coordination normal.      Gait: Gait and tandem walk normal.      Deep Tendon Reflexes: Reflexes are normal and symmetric. Psychiatric:         Attention and Perception: Attention and perception normal. He is attentive. Mood and Affect: Mood is anxious. Mood is not depressed. Affect is not tearful. Speech: He is communicative. Speech is not rapid and pressured, delayed or slurred. Behavior: Behavior normal. Behavior is not agitated, slowed, aggressive or withdrawn. Thought Content: Thought content normal.         Judgment: Judgment normal.             ASSESSMENT AND PLAN      1.  Diabetic ulcer of toe of right foot associated with type 2 diabetes mellitus, with muscle involvement without evidence of necrosis ([address]  Diabetic foot ulcer, discussed with patient start antibiotic follow-up with podiatrist, daily dressings. If it did not heal call back tight control of blood sugars  - doxycycline monohydrate (ADOXA) 100 MG tablet; Take 1 tablet by mouth 2 times daily With food  Dispense: 20 tablet; Refill: 0  - mupirocin (BACTROBAN) 2 % cream; Apply 3 times daily. Dispense: 1 each; Refill: 1    2. Wound cellulitis  Start antibiotic both oral and topical.  - doxycycline monohydrate (ADOXA) 100 MG tablet; Take 1 tablet by mouth 2 times daily With food  Dispense: 20 tablet; Refill: 0  - mupirocin (BACTROBAN) 2 % cream; Apply 3 times daily. Dispense: 1 each; Refill: 1    3. Type 2 diabetes mellitus with diabetic polyneuropathy, with long-term current use of insulin ([facility] [address]  Uncontrolled discussed with patient to increase insulin before meals by 1 unit to have better control on sugars follow-up with endocrinologist.      No orders of the defined types were placed in this encounter. Medications Discontinued During This Encounter   Medication Reason    cephALEXin (KEFLEX) 500 MG capsule Therapy completed       Miguel received counseling on the following healthy behaviors: nutrition, exercise and medication adherence  Reviewed prior labs and health maintenance. Continue current medications, diet and exercise. Discussed use, benefit, and side effects of prescribed medications. Barriers to medication compliance addressed. Patient given educational materials - see patient instructions. All patient questions answered. Patient voiced understanding. The patient'spast medical, surgical, social, and family history as well as his   current medications and allergies were reviewed as documented in today's encounter. Medications, labs, diagnostic studies, consultations andfollow-up as documented in this encounter.     Return in about 4 weeks (around 5/17/2022) for diabetic ulcer pcp. Patient wasseen with total face to face time of 30 minutes. More than 50% of this visit was counseling and education. Future Appointments   Date Time Provider William Hernandez   5/5/2022  1:30 PM Ena Carrillo MD  [facility]   5/17/2022 11:00 AM Michelle Pittman MD [facility]   6/7/2022 11:30 AM [facility] CHF CLINIC [address]   3/7/2023  9:00 AM Michelle Pittman MD [facility]     This note was completed by using the assistance of a speech-recognition program. However, inadvertent computerized transcription errors may be present. Althoughevery effort was made to ensure accuracy, no guarantees can be provided that every mistake has been identified and corrected by editing.   Electronically signed by Kimberley Lundberg MD on 4/19/2022  10:48 AM

## 2022-04-19 NOTE — PROGRESS NOTES
Visit Information    Have you changed or started any medications since your last visit including any over-the-counter medicines, vitamins, or herbal medicines? no   Have you stopped taking any of your medications? Is so, why? -  no  Are you having any side effects from any of your medications? - no    Have you seen any other physician or provider since your last visit? YES  Have you had any other diagnostic tests since your last visit?  no   Have you been seen in the emergency room and/or had an admission in a hospital since we last saw you?  no   Have you had your routine dental cleaning in the past 6 months?  no     Do you have an active MyChart account? If no, what is the barrier?   Yes    Patient Care Team:  Jan Merida MD as PCP - General (Family Medicine)  Jan Merida MD as PCP - [facility]  Nehemias Weiner MD as Consulting Physician (Endocrinology)  Hazel Dsouza DO as Consulting Physician (Cardiology)  Kobe Dillon DPM as Surgeon (Podiatry)  Sumit Moy MD as Consulting Physician (Ophthalmology)  Nena Moss MD as Consulting Physician (Nephrology)  Bo Benitez MD as Consulting Physician (Pulmonology)  Elaine Corley MD as Surgeon (Vascular Surgery)  Hiram Larson MD as Consulting Physician (Gastroenterology)  Luisito Cerda, [address] [place] [address] as Pharmacist (Pharmacist)  Oskar Chin MD as Consulting Physician (Pulmonology)  Benjamin Boyce MD as Consulting Physician (Urology)  Josué Sanon MD as Surgeon (Ophthalmology)  Carlos Leone MD as Consulting Physician (Neurology)  Bo Benitez DO as Consulting Physician (Otolaryngology)  Hiram Larson MD as Consulting Physician (Gastroenterology)  NICOLE Shipman - CNP as Nurse Practitioner (Otolaryngology)  Nena Moss MD as Consulting Physician (Nephrology)  Domitila Wisdom MD as Consulting Physician (Oncology)    Medical History Review  Past Medical, Family, and Social History reviewed and does contribute to the patient presenting condition    Health Maintenance   Topic Date Due    Pneumococcal 0-64 years Vaccine (2 - PCV) 05/23/2014    Diabetic retinal exam  10/24/2019    Diabetic foot exam  05/02/2020    A1C test (Diabetic or Prediabetic)  05/24/2022    Lipid screen  09/12/2022    Depression Monitoring  03/02/2023    Annual Wellness Visit (AWV)  03/03/2023    Potassium monitoring  04/07/2023    Creatinine monitoring  04/07/2023    Colorectal Cancer Screen  04/12/2024    DTaP/Tdap/Td vaccine (3 - Td or Tdap) 09/12/2028    Hepatitis B vaccine  Completed    Flu vaccine  Completed    Shingles Vaccine  Completed    COVID-19 Vaccine  Completed    Hepatitis C screen  Completed    HIV screen  Completed    Hepatitis A vaccine  Aged Out    Hib vaccine  Aged Out    Meningococcal (ACWY) vaccine  Aged Out

## 2022-04-28 ENCOUNTER — PATIENT MESSAGE (OUTPATIENT)
Dept: FAMILY MEDICINE CLINIC | Age: 57
End: 2022-04-28

## 2022-04-28 ENCOUNTER — HOSPITAL ENCOUNTER (OUTPATIENT)
Age: 57
Setting detail: SPECIMEN
Discharge: HOME | End: 2022-04-28
Payer: COMMERCIAL

## 2022-04-28 DIAGNOSIS — M48.061 SPINAL STENOSIS OF LUMBAR REGION WITHOUT NEUROGENIC CLAUDICATION: ICD-10-CM

## 2022-04-28 DIAGNOSIS — I12.9 BENIGN HYPERTENSION WITH CKD (CHRONIC KIDNEY DISEASE) STAGE III (HCC): ICD-10-CM

## 2022-04-28 DIAGNOSIS — N18.30 SECONDARY DIABETES MELLITUS WITH STAGE 3 CHRONIC KIDNEY DISEASE (HCC): ICD-10-CM

## 2022-04-28 DIAGNOSIS — M54.42 CHRONIC MIDLINE LOW BACK PAIN WITH LEFT-SIDED SCIATICA: ICD-10-CM

## 2022-04-28 DIAGNOSIS — N18.31 ANEMIA IN STAGE 3A CHRONIC KIDNEY DISEASE (HCC): ICD-10-CM

## 2022-04-28 DIAGNOSIS — N17.9 AKI (ACUTE KIDNEY INJURY) (HCC): ICD-10-CM

## 2022-04-28 DIAGNOSIS — M96.1 POSTLAMINECTOMY SYNDROME OF LUMBAR REGION: ICD-10-CM

## 2022-04-28 DIAGNOSIS — D63.1 ANEMIA IN STAGE 3A CHRONIC KIDNEY DISEASE (HCC): ICD-10-CM

## 2022-04-28 DIAGNOSIS — E13.22 SECONDARY DIABETES MELLITUS WITH STAGE 3 CHRONIC KIDNEY DISEASE (HCC): ICD-10-CM

## 2022-04-28 DIAGNOSIS — G89.29 CHRONIC MIDLINE LOW BACK PAIN WITH LEFT-SIDED SCIATICA: ICD-10-CM

## 2022-04-28 DIAGNOSIS — D50.0 IRON DEFICIENCY ANEMIA DUE TO CHRONIC BLOOD LOSS: ICD-10-CM

## 2022-04-28 DIAGNOSIS — M51.36 DDD (DEGENERATIVE DISC DISEASE), LUMBAR: ICD-10-CM

## 2022-04-28 DIAGNOSIS — N18.30 BENIGN HYPERTENSION WITH CKD (CHRONIC KIDNEY DISEASE) STAGE III (HCC): ICD-10-CM

## 2022-04-28 DIAGNOSIS — N18.31 STAGE 3A CHRONIC KIDNEY DISEASE (HCC): ICD-10-CM

## 2022-04-28 LAB
ABSOLUTE EOS #: 0.3 K/UL (ref 0–0.4)
ABSOLUTE LYMPH #: 0.8 K/UL (ref 1–4.8)
ABSOLUTE MONO #: 0.6 K/UL (ref 0.1–1.3)
ANION GAP SERPL CALCULATED.3IONS-SCNC: 13 MMOL/L (ref 9–17)
BACTERIA: ABNORMAL
BASOPHILS # BLD: 1 % (ref 0–2)
BASOPHILS ABSOLUTE: 0.1 K/UL (ref 0–0.2)
BILIRUBIN URINE: NEGATIVE
BUN BLDV-MCNC: 23 MG/DL (ref 6–20)
CALCIUM SERPL-MCNC: 9.6 MG/DL (ref 8.6–10.4)
CASTS UA: ABNORMAL /LPF
CHLORIDE BLD-SCNC: 98 MMOL/L (ref 98–107)
CO2: 29 MMOL/L (ref 20–31)
COLOR: YELLOW
CREAT SERPL-MCNC: 1.39 MG/DL (ref 0.7–1.2)
EOSINOPHILS RELATIVE PERCENT: 3 % (ref 0–4)
EPITHELIAL CELLS UA: ABNORMAL /HPF
GFR AFRICAN AMERICAN: >60 ML/MIN
GFR NON-AFRICAN AMERICAN: 53 ML/MIN
GFR SERPL CREATININE-BSD FRML MDRD: ABNORMAL ML/MIN/{1.73_M2}
GLUCOSE BLD-MCNC: 149 MG/DL (ref 70–99)
GLUCOSE URINE: NEGATIVE
HCT VFR BLD CALC: 36.7 % (ref 41–53)
HEMOGLOBIN: 11.7 G/DL (ref 13.5–17.5)
KETONES, URINE: NEGATIVE
LEUKOCYTE ESTERASE, URINE: ABNORMAL
LYMPHOCYTES # BLD: 8 % (ref 24–44)
MAGNESIUM: 1.9 MG/DL (ref 1.6–2.6)
MCH RBC QN AUTO: 26 PG (ref 26–34)
MCHC RBC AUTO-ENTMCNC: 31.9 G/DL (ref 31–37)
MCV RBC AUTO: 81.6 FL (ref 80–100)
MONOCYTES # BLD: 6 % (ref 1–7)
NITRITE, URINE: NEGATIVE
PDW BLD-RTO: 18 % (ref 11.5–14.9)
PH UA: 6.5 (ref 5–8)
PHOSPHORUS: 4.1 MG/DL (ref 2.5–4.5)
PLATELET # BLD: 217 K/UL (ref 150–450)
PMV BLD AUTO: 8.2 FL (ref 6–12)
POTASSIUM SERPL-SCNC: 3.9 MMOL/L (ref 3.7–5.3)
PROTEIN UA: NEGATIVE
RBC # BLD: 4.5 M/UL (ref 4.5–5.9)
RBC UA: ABNORMAL /HPF
SEG NEUTROPHILS: 82 % (ref 36–66)
SEGMENTED NEUTROPHILS ABSOLUTE COUNT: 8.2 K/UL (ref 1.3–9.1)
SODIUM BLD-SCNC: 140 MMOL/L (ref 135–144)
SPECIFIC GRAVITY UA: 1.02 (ref 1–1.03)
TURBIDITY: CLEAR
URINE HGB: NEGATIVE
UROBILINOGEN, URINE: NORMAL
WBC # BLD: 10 K/UL (ref 3.5–11)
WBC UA: ABNORMAL /HPF

## 2022-04-28 PROCEDURE — 83735 ASSAY OF MAGNESIUM: CPT

## 2022-04-28 PROCEDURE — 81001 URINALYSIS AUTO W/SCOPE: CPT

## 2022-04-28 PROCEDURE — 36415 COLL VENOUS BLD VENIPUNCTURE: CPT

## 2022-04-28 PROCEDURE — 80048 BASIC METABOLIC PNL TOTAL CA: CPT

## 2022-04-28 PROCEDURE — 84100 ASSAY OF PHOSPHORUS: CPT

## 2022-04-28 PROCEDURE — 85025 COMPLETE CBC W/AUTO DIFF WBC: CPT

## 2022-04-28 RX ORDER — OXYCODONE HYDROCHLORIDE 10 MG/1
10 TABLET ORAL EVERY 8 HOURS PRN
Qty: 90 TABLET | Refills: 0 | Status: SHIPPED | OUTPATIENT
Start: 2022-04-28 | End: 2022-05-31 | Stop reason: SDUPTHER

## 2022-04-28 NOTE — TELEPHONE ENCOUNTER
From: Marta Jordan.   To: Dr. Draper Pitcher: 4/28/2022 9:29 AM EDT  Subject: Meds     Can you please refill my pain meds oxyCODONE HCL 10MG one every 8 hours thank you so much

## 2022-04-29 NOTE — RESULT ENCOUNTER NOTE
Fozia comment sent to patient.   Stable chronic kidney disease stage III, blood glucose well controlled 149  Future Appointments  5/5/2022   1:30 PM    Mary Sutton MD     [facility] [address]        [facility]  5/11/2022  10:50 AM   [facility] MEDICATION MGMT       [facility] MED MGMT        [facility]  5/17/2022  11:00 AM   Walker Tuttle MD     [facility]  6/7/2022   11:30 AM   [facility] CHF CLINIC  [address]  3/7/2023   9:00 AM    Walker Tuttle MD     [facility]

## 2022-05-04 ENCOUNTER — HOSPITAL ENCOUNTER (OUTPATIENT)
Facility: MEDICAL CENTER | Age: 57
End: 2022-05-04

## 2022-05-04 ENCOUNTER — HOSPITAL ENCOUNTER (OUTPATIENT)
Age: 57
Discharge: HOME | End: 2022-05-04
Payer: COMMERCIAL

## 2022-05-04 DIAGNOSIS — D50.0 IRON DEFICIENCY ANEMIA DUE TO CHRONIC BLOOD LOSS: ICD-10-CM

## 2022-05-04 LAB
FERRITIN: 89 NG/ML (ref 30–400)
IRON SATURATION: 12 % (ref 20–55)
IRON: 40 UG/DL (ref 59–158)
TOTAL IRON BINDING CAPACITY: 329 UG/DL (ref 250–450)
UNSATURATED IRON BINDING CAPACITY: 289 UG/DL (ref 112–347)

## 2022-05-04 PROCEDURE — 82728 ASSAY OF FERRITIN: CPT

## 2022-05-04 PROCEDURE — 36415 COLL VENOUS BLD VENIPUNCTURE: CPT

## 2022-05-04 PROCEDURE — 83540 ASSAY OF IRON: CPT

## 2022-05-04 PROCEDURE — 83550 IRON BINDING TEST: CPT

## 2022-05-05 ENCOUNTER — TELEPHONE (OUTPATIENT)
Dept: ONCOLOGY | Age: 57
End: 2022-05-05

## 2022-05-05 ENCOUNTER — OFFICE VISIT (OUTPATIENT)
Dept: ONCOLOGY | Age: 57
End: 2022-05-05
Payer: COMMERCIAL

## 2022-05-05 VITALS
SYSTOLIC BLOOD PRESSURE: 130 MMHG | DIASTOLIC BLOOD PRESSURE: 54 MMHG | BODY MASS INDEX: 55.88 KG/M2 | HEART RATE: 80 BPM | WEIGHT: 315 LBS | RESPIRATION RATE: 16 BRPM | TEMPERATURE: 97.3 F

## 2022-05-05 DIAGNOSIS — D50.0 IRON DEFICIENCY ANEMIA DUE TO CHRONIC BLOOD LOSS: ICD-10-CM

## 2022-05-05 DIAGNOSIS — N18.32 ANEMIA OF CHRONIC RENAL FAILURE, STAGE 3B (HCC): ICD-10-CM

## 2022-05-05 DIAGNOSIS — D64.9 NORMOCYTIC ANEMIA: Primary | ICD-10-CM

## 2022-05-05 DIAGNOSIS — D63.1 ANEMIA OF CHRONIC RENAL FAILURE, STAGE 3B (HCC): ICD-10-CM

## 2022-05-05 PROCEDURE — 99214 OFFICE O/P EST MOD 30 MIN: CPT | Performed by: INTERNAL MEDICINE

## 2022-05-05 PROCEDURE — G8417 CALC BMI ABV UP PARAM F/U: HCPCS | Performed by: INTERNAL MEDICINE

## 2022-05-05 PROCEDURE — 99211 OFF/OP EST MAY X REQ PHY/QHP: CPT | Performed by: INTERNAL MEDICINE

## 2022-05-05 PROCEDURE — G8428 CUR MEDS NOT DOCUMENT: HCPCS | Performed by: INTERNAL MEDICINE

## 2022-05-05 PROCEDURE — 1036F TOBACCO NON-USER: CPT | Performed by: INTERNAL MEDICINE

## 2022-05-05 PROCEDURE — 3017F COLORECTAL CA SCREEN DOC REV: CPT | Performed by: INTERNAL MEDICINE

## 2022-05-05 NOTE — TELEPHONE ENCOUNTER
WILIAN HERE FOR MD VISIT  CONTINUE PO IRON  RV 6 MTHS W/ LABS BEFORE RV  LABS CDP FERRITIN FE TIBC ORDERS MAILED TO PT TO BE DONE 10/25/22  MD VISIT 11/1/22 @ 10:15AM  AVS PRINTED W/ INSTRUCTIONS AND GIVEN TO PT ON EXIT

## 2022-05-07 ENCOUNTER — PATIENT MESSAGE (OUTPATIENT)
Dept: FAMILY MEDICINE CLINIC | Age: 57
End: 2022-05-07

## 2022-05-07 DIAGNOSIS — E78.5 HYPERLIPIDEMIA WITH TARGET LDL LESS THAN 70: ICD-10-CM

## 2022-05-09 RX ORDER — ROSUVASTATIN CALCIUM 10 MG/1
10 TABLET, COATED ORAL NIGHTLY
Qty: 90 TABLET | Refills: 3 | Status: SHIPPED | OUTPATIENT
Start: 2022-05-09

## 2022-05-09 NOTE — TELEPHONE ENCOUNTER
From: Joseph De Jesus. To: Dr. Christiano Frederick: 5/7/2022 12:22 PM EDT  Subject: Meds     You you please refill my ROSUVASTATIN CALCIUM.  10 mg Tab once nightly

## 2022-05-09 NOTE — PROGRESS NOTES
_           Chief Complaint   Patient presents with    Follow-up    Discuss Labs     DIAGNOSIS:       Normocytic anemia secondary to chronic renal insufficiency  Stage III chronic renal insufficiency  Morbid obesity  Diabetes  Multiple comorbidities as listed    CURRENT THERAPY:         PO iron  Consider epogen for Hb below 10. BRIEF CASE HISTORY:      Mr. Chel Roa is a very pleasant 61 y.o. male with history of multiple co morbidities as listed. Patient is referred for evaluation of anemia. The patient has multiple comorbidities. He has diabetes mellitus for long duration. He has stage III renal insufficiency. He had so many other health problems as stated below. Patient has morbid obesity with the chronic back problems. Multiple surgeries. The patient is referred for evaluation of anemia. He denies any active bleeding. No melena or hematochezia. No hematemesis. No hematuria. He has weakness and fatigue. No dizziness. No palpitation. No fever or infections. Patient had GI evaluation with colonoscopy last year. Polyps were removed. No active bleeding. Patient is smoker of 1.5 packs per day for 40 years. .  Social alcohol drinking. INTERIM HISTORY:   Seen for follow up anemia. Clinically stable. Mild weakness and fatigue. No dizziness. No fever. No active bleeding.   PAST MEDICAL HISTORY: has a past medical history of Acute kidney injury superimposed on CKD ([address] Acute on chronic congestive heart failure (HCC), Acute on chronic kidney failure (HCC), Acute on chronic respiratory failure (HCC), Acute on chronic respiratory failure with hypoxia (HCC), Adhesive capsulitis of left shoulder, Anemia, normocytic normochromic, Anxiety, Arthropathy, unspecified, other specified sites, Asthma, B12 deficiency, Bilateral lower leg cellulitis, Blood in stool, CAD (coronary artery disease), Cardiovascular stress test abnormal, Cellulitis of both lower extremities, Cellulitis of leg, left, CHF (congestive heart failure), NYHA class III ([facility]), Chronic back pain, Chronic bronchitis ([facility]), Chronic headaches, Chronic infective otitis externa, Chronic kidney disease, Chronic malignant otitis externa of both ears, Chronic respiratory failure ([address] Chronic ulcer of left leg, with fat layer exposed ([address] Class 2 severe obesity due to excess calories with serious comorbidity and body mass index (BMI) of 35.0 to 35.9 in adult [facility]), COPD exacerbation ([address] Diabetic neuropathy ([address] Displacement of lumbar intervertebral disc without myelopathy, Ear infection, Elevated troponin, Essential hypertension, Facial cellulitis, Fall, GERD (gastroesophageal reflux disease), Head injury, Hearing loss in right ear, Hepatic steatosis, History of general anesthesia complication, History of rib fracture, Hyperlipidemia, Hypersomnia, Hypertension, Insomnia, Intolerance of continuous positive airway pressure (CPAP) ventilation, Iron deficiency, Localized rash, Lymphedema of both lower extremities, Magnesium deficiency, Mastoiditis of left side, Mixed conductive and sensorineural hearing loss of both ears, Mixed type COPD (chronic obstructive pulmonary disease) (HCC), Moderate recurrent major depression ([address] Morbid obesity with BMI of 45.0-49.9, adult ([address] NSTEMI (non-ST elevated myocardial infarction) ([address] On home oxygen therapy, Open wound of groin, BARBY on CPAP, Osteoarthritis, Otitis externa of left ear, Pancreatitis chronic, Persistent depressive disorder, Renal insufficiency, Seizures ([address] Severe depression ([address] Spinal stenosis of lumbar region without neurogenic claudication, Syncope, Tinnitus of both ears, Type 2 diabetes mellitus with stage 3 chronic kidney disease, with long-term current use of insulin ([address] Type II or unspecified type diabetes mellitus without mention of complication, not stated as uncontrolled, Uncontrolled type 2 diabetes mellitus with hyperglycemia ([facility] Unstable angina ([facility] Vitamin D deficiency, and Wears glasses. PAST SURGICAL HISTORY: has a past surgical history that includes tympanomastoidectomy (Bilateral, 09/20/2012); Coronary angioplasty with stent (03/2013); Hand tendon surgery (Left); Knee arthroscopy (Left); Nerve Block (07/31/2015); Cardiac catheterization (04/23/2018); pr esophagogastroduodenoscopy transoral diagnostic (N/A, 07/18/2018); Intracapsular cataract extraction (Right, 11/05/2019); Intracapsular cataract extraction (Left, 01/07/2020); back surgery ( (x 4) 2000,.12/2011.2/2012); Colonoscopy (11/03/2015); Colonoscopy (2013); Colonoscopy (N/A, 04/12/2021); and eye surgery. CURRENT MEDICATIONS:  has a current medication list which includes the following prescription(s): oxycodone hcl, mupirocin, albuterol, escitalopram, freestyle floresita 14 day sensor, humulin r, vitamin d3, nystatin, chlorhexidine, lisinopril, venlafaxine, gabapentin, butalbital-acetaminophen-caffeine, magnesium-oxide, pantoprazole, nystatin, tizanidine, allopurinol, ferosul, spiriva respimat, advair hfa, metoprolol tartrate, mucus relief, amitriptyline, blood glucose test strips, bumetanide, ammonium lactate, rocklatan, insulin syringe-needle u-100, docusate sodium, latanoprost, clopidogrel, nitroglycerin, rosuvastatin, proair hfa, clobetasol, ketoconazole, lancets, vitamin b-12, oxygen tubing, nebulizer/tubing/mouthpiece, one touch ultrasoft lancets, lancing device, handicap placard, fluticasone, fluticasone, melatonin, aspirin, naloxone, and epoetin [id number]. ALLERGIES:  is allergic to levofloxacin, lorazepam, nsaids, prozac [fluoxetine hcl], and vancomycin. FAMILY HISTORY: Negative for any hematological or oncological conditions. SOCIAL HISTORY:  reports that he quit smoking about 18 months ago. His smoking use included cigarettes. He started smoking about 36 years ago.  He has a 8.25 pack-year smoking history. He quit smokeless tobacco use about 26 years ago. His smokeless tobacco use included snuff. He reports current drug use. Drug: Marijuana [place]). He reports that he does not drink alcohol. REVIEW OF SYSTEMS:     · General: Positive for weakness and fatigue. No unanticipated weight loss or decreased appetite. No fever or chills. · Eyes: No blurred vision, eye pain or double vision. · Ears: No hearing problems or drainage. No tinnitus. · Throat: No sore throat, problems with swallowing or dysphagia. · Respiratory: No cough, sputum or hemoptysis. No shortness of breath. No pleuritic chest pain. · Cardiovascular: No chest pain, orthopnea or PND. No lower extremity edema. No palpitation. · Gastrointestinal: No problems with swallowing. No abdominal pain or bloating. No nausea or vomiting. No diarrhea or constipation. No GI bleeding. · Genitourinary: No dysuria, hematuria, frequency or urgency. · Musculoskeletal: As above. · Dermatologic: No skin rashes or pruritus. No skin lesions or discolorations. · Psychiatric: No depression, anxiety, or stress or signs of schizophrenia. No change in mood or affect. · Hematologic: No history of bleeding tendency. No bruises or ecchymosis. No history of clotting problems. · Infectious disease: No fever, chills or frequent infections. · Endocrine: No polydipsia or polyuria. No temperature intolerance. · Neurologic: No headaches or dizziness. No weakness or numbness of the extremities. No changes in balance, coordination,  memory, mentation, behavior. · Allergic/Immunologic: No nasal congestion or hives. No repeated infections. PHYSICAL EXAM:  The patient is not in acute distress. Vital signs: Blood pressure (!) 130/54, pulse 80, temperature 97.3 °F (36.3 °C), resp. rate 16, weight (!) 412 lb (186.9 kg). General appearance - well appearing, not in pain or distress. Morbidly obese.   Mental status - good mood, alert and oriented  Eyes - pupils equal and reactive, extraocular eye movements intact  Ears - bilateral TM's and external ear canals normal  Nose - normal and patent, no erythema, discharge or polyps  Mouth - mucous membranes moist, pharynx normal without lesions  Neck - supple, no significant adenopathy  Lymphatics - no palpable lymphadenopathy, no hepatosplenomegaly  Chest - clear to auscultation, no wheezes, rales or rhonchi, symmetric air entry  Heart - normal rate, regular rhythm, normal S1, S2, no murmurs, rubs, clicks or gallops  Abdomen - soft, nontender, nondistended, no masses or organomegaly  Neurological - alert, oriented, normal speech, no focal findings or movement disorder noted  Musculoskeletal -limited movement due to morbid obesity and chronic back problems extremities - peripheral pulses normal, no pedal edema, no clubbing or cyanosis  Skin - normal coloration and turgor, no rashes, no suspicious skin lesions noted     Review of Diagnostic data:   Lab Results   Component Value Date    WBC 10.0 04/28/2022    HGB 11.7 (L) 04/28/2022    HCT 36.7 (L) 04/28/2022    MCV 81.6 04/28/2022     04/28/2022       Chemistry        Component Value Date/Time     04/28/2022 0904    K 3.9 04/28/2022 0904    CL 98 04/28/2022 0904    CO2 29 04/28/2022 0904    BUN 23 (H) 04/28/2022 0904    CREATININE 1.39 (H) 04/28/2022 0904        Component Value Date/Time    CALCIUM 9.6 04/28/2022 0904    ALKPHOS 90 02/10/2022 1016    AST 14 02/10/2022 1016    ALT 11 02/10/2022 1016    BILITOT 0.33 02/10/2022 1016            IMPRESSION:   Normocytic anemia secondary to chronic renal insufficiency  Stage III chronic renal insufficiency  Morbid obesity  Diabetes  Multiple comorbidities as listed    PLAN: I reviewed the labs asa april and discussed with the patient. I again explained to the patient the nature of this hematologic problem. I explained the significance of these abnormalities in layman language.    Reviewing patient's labs obviously he is having chronic normocytic anemia. Labs are getting slowly worse over the last 10 years. This is correlating with deterioration of the kidney function as well. Iron studies and vitamin B12 and folate are not showing any significant abnormalities. Patient's anemia is anemia of chronic disease secondary to chronic renal insufficiency. However hemoglobin is maintained above 10. It is improving as well. So he does not qualify for erythropoietin treatment at the present time. I recommend close monitoring and consideration of treatment with Epogen once hemoglobin is below 10. Patient totally understands and agrees with this plan. Will continue PO iron. We will evaluate him in 6 months. Sooner for any problems. Patient's questions were answered to the best of his satisfaction and he verbalized full understanding and agreement.

## 2022-05-11 ENCOUNTER — TELEPHONE (OUTPATIENT)
Dept: PHARMACY | Age: 57
End: 2022-05-11

## 2022-05-11 ENCOUNTER — HOSPITAL ENCOUNTER (OUTPATIENT)
Dept: PHARMACY | Age: 57
Setting detail: THERAPIES SERIES
Discharge: HOME | End: 2022-05-11
Payer: COMMERCIAL

## 2022-05-11 DIAGNOSIS — Z79.4 TYPE 2 DIABETES MELLITUS WITH DIABETIC POLYNEUROPATHY, WITH LONG-TERM CURRENT USE OF INSULIN (HCC): ICD-10-CM

## 2022-05-11 DIAGNOSIS — E11.42 TYPE 2 DIABETES MELLITUS WITH DIABETIC POLYNEUROPATHY, WITH LONG-TERM CURRENT USE OF INSULIN (HCC): ICD-10-CM

## 2022-05-11 DIAGNOSIS — E11.69 DIABETES MELLITUS TYPE 2 IN OBESE (HCC): ICD-10-CM

## 2022-05-11 DIAGNOSIS — E66.9 DIABETES MELLITUS TYPE 2 IN OBESE (HCC): ICD-10-CM

## 2022-05-11 PROCEDURE — 99213 OFFICE O/P EST LOW 20 MIN: CPT

## 2022-05-11 RX ORDER — GLUCOSAMINE HCL/CHONDROITIN SU 500-400 MG
CAPSULE ORAL
Qty: 300 STRIP | Refills: 3 | Status: SHIPPED | OUTPATIENT
Start: 2022-05-11 | End: 2022-09-06 | Stop reason: SDUPTHER

## 2022-05-11 RX ORDER — FLASH GLUCOSE SENSOR
KIT MISCELLANEOUS
Qty: 2 EACH | Refills: 5 | Status: SHIPPED | OUTPATIENT
Start: 2022-05-11 | End: 2022-07-28 | Stop reason: SDUPTHER

## 2022-05-11 NOTE — PROGRESS NOTES
Diabetic Medication Management   [address]. [place], [address] [facility]  Phone: [phone]  Fax: [phone]    NAME: Saritha Bear MEDICAL RECORD NUMBER:  [id number]  AGE: 61 y.o. GENDER: male  : 1965  EPISODE DATE:  2022       Mr. Shasta Perez was referred to [facility] Medication Management Services by Dr. Nila Najera, Special instructions include: titrate all medications (as defined in clinic's policy and procedure)    Patient seen in office. Goals per referral:   Fasting blood glucose: < 130  Peak postprandial glucose: < 180  A1C: < 7    Other goals:  Blood pressure goal: 130/80  Weight loss goal (~10%): Target weight  410 to be reached by date: 2022 (3-6 months)  Physical Activity goal:    Discussed but nothing formal at this time and no specific goals set  Smoking Cessation   Quit Date: Stopped 20. Cholesterol at target:   Date: Yes LDL 46  HDL 30 Trig 416 (2021)  Annual eye exam:    Date: 2022  Comprehensive annual foot exam:   Date:  2022  Annual urine Albumin and serum creatinine:   Date:  microalbumin <12 mg/L (3/2/22), SrCr 1.39 mg/dL (22) eGFR 53 ml/min        Subjective   Mr. Shasta Perez is a 61 y.o. male here for the Diabetes Service for self-management education, medication review including over the counter medications and herbal products, overall well-being assessment, transition of care and any needed adjustments with updates and recommendations communicated to the referring physician. Patient's name and  verified. Patient Findings:    Patient has what he states is Psoriasis on right hand and starting on left. Patient has right hand covered with glove. States it is spreading and painful on thumb. Topical treatment give by PCP is not effective he states and knows he needs to see a dermatologist. Plans to make an appt.      Patient has had some hypoglycemia but knows it is due to him skipping meals, adjusting his insulin dose or taking his insulin or taking his insulin (mealtime) then planning on eating but not ending up eating. Patient reports treatment hypglycemia with 8 oz of orange juice or 2-3 glucose tablets. Discussed treatment of hypoglycemia should be about 15 gm of carbs which would be about 4 oz orange juice or 4 glucose tablets so he is either under or over treating his blood sugar. Also discussed rechecking blood sugar in 15 min and re-dosing if blood sugar still low. Lastly discussed eating something with protein / carbs to help keep blood sugar from bottoming out again after regains normal blood sugar. Patient understands and will following instructions for future. Patient has been adjusting his insulin on his own depending on what he is planning on eating and what his blood sugar is at that time. If blood sugar below 200 at lunch time wont take full . 55. but takes 40-55 depending on what he is going to eat. Blood sugars in evening seem to be reasonably well controlled. .  If blood sugar at dinner time is above 200 patient taking . 30-. 35 instead of .25. Patient is often having hypoglycemia in middle of night so encouraged him to not adjust his dinner dose but to take . 25 and always eat. Patient admits to sometimes skipping dinner when blood sugar is high and discussed how this is contributing to low blood sugars. Patient states he does not soto his breakfast insulin dose. Patient's next appt with Dr. Phyllis Escalona is on June 8, 2022    Patient taking CBD gummies for pain control. Patient reports at least makes pain more tolerable. Patient reports more temptation to smoke cigarettes recently and has had about 7 in last month. Has not bought any cigarettes but gets them from friends/family members when they come over. Discussed that cravings will never completely go away and to develop other mechanisms to cope when wants a cigarette.   Patient reports he wants to smoke when feels frustrated. Discussed alternatives he think will help including doing crosswords or coloring in coloring books with his granddaughter. One can of pop since last vist.  Normally drinks diet green tea. This is a huge improvement since last visit and congratulated patient. Still needs portable oxygen and hospital bed for home. Patient is being told on backorder. Encouraged him to discuss with his pulmonologist.    Needs refill on sensors, glucose strips (one touch ultra). Prescriptions sent to [facility]. Has enough syringes and  Insulin    Patient reports he often will take insulin after he has started eating breakfast and sometimes up to an hour later. Blood sugars in afternoon are not controlled and see to trend upward after breakfast.  Educated patient that if he allows his blood sugar to start to increase then takes insulin it is not as effective in keeping blood sugar controlled. Educated patient to always take insulin at beginning of meal.     No exercise. Wants to get on stationary bike or treadmill. Not able to do anything currently due to SOB. Even sitting. Will see pulmonologist next week. Albuterol works for about 20 min.     []  Missed doses   []  Emergency Room Visit    []  Medication changes  []  Hospitalization   []  Diet changes   []  Acute illness   []  Activity changes      Symptoms of hypoglycemia    []  None    []  Shakiness    [x]  Lightheadedness or dizziness   []  Confusion      []  Sweating   [x]  Other wakes up from sleeping       Symptoms of hyperglycemia -.    []  None   [x]  Frequent urination    []  Increased thirst   []  Other    Medication adverse reactions (none due to diabetic medicaitons)   [x]  None   []  Diarrhea / Nausea / Vomiting / Constipation / flatulence   []  Hypertension   []  Peripheral edema     []  Signs of infection   []  Headache   []  Vision changes   []  Increased cholesterol    []  Weight gain   []  Change in renal function   []  Increased potassium  []  Other          If recent hospital admission / ED visit, was this related to Diabetes No:Chest pain      Objective     PMHx:    Past Medical History:   Diagnosis Date    Acute kidney injury superimposed on CKD ([address] 4/10/2013    Acute on chronic congestive heart failure (HCC)     Acute on chronic kidney failure ([address] 07/20/2017    Acute on chronic respiratory failure ([address] 10/02/2018    Acute on chronic respiratory failure with hypoxia (HCC) 9/30/2021    Adhesive capsulitis of left shoulder 03/25/2017    Anemia, normocytic normochromic 9/12/2021    Anxiety 10/02/2016    smokes marijuana for this    Arthropathy, unspecified, other specified sites 06/13/2013    Asthma     B12 deficiency     Bilateral lower leg cellulitis 02/17/2016    Blood in stool     CAD (coronary artery disease)     Cardiovascular stress test abnormal 2018    Cellulitis of both lower extremities 05/25/2017    Cellulitis of leg, left 07/20/2017    CHF (congestive heart failure), NYHA class III ([facility]) 08/14/2013    Chronic back pain     Chronic bronchitis ([facility])     Chronic headaches     was referred to neuro, testing scheduled    Chronic infective otitis externa 4/28/2017    Chronic kidney disease     Chronic malignant otitis externa of both ears 7/24/2020    Chronic respiratory failure ([address]     was on vent    Chronic ulcer of left leg, with fat layer exposed ([address] 02/22/2019    healed    Class 2 severe obesity due to excess calories with serious comorbidity and body mass index (BMI) of 35.0 to 35.9 in adult ([address]     (BMI 35.0-39.9 without comorbidity)    COPD exacerbation ([address] 11/02/2016    Diabetic neuropathy ([address] 08/14/2013    Displacement of lumbar intervertebral disc without myelopathy 06/13/2013    Ear infection     RIGHT    Elevated troponin     Essential hypertension     Facial cellulitis 2012    Fall 03/25/2017    GERD (gastroesophageal reflux disease)     Head injury     Hearing loss in right ear     pencil pierced ear as a child    Hepatic steatosis 12/03/2015    History of general anesthesia complication     has woke up during surgery under anesthesia    History of rib fracture 12/03/2015    Chronic     Hyperlipidemia     Hypersomnia     can go multiple days without sleeping    Hypertension     Insomnia     Intolerance of continuous positive airway pressure (CPAP) ventilation 07/20/2017    Iron deficiency     Localized rash     gets frequently in axilla, groin, in any fold, on several topical treatments for this    Lymphedema of both lower extremities 4/27/2021    Magnesium deficiency     Mastoiditis of left side     Mixed conductive and sensorineural hearing loss of both ears 01/10/2017    Per ENT    Mixed type COPD (chronic obstructive pulmonary disease) ([address]     On home O2, multiple inhlaers, nebulizer    Moderate recurrent major depression ([address] 10/02/2016    Morbid obesity with BMI of 45.0-49.9, adult ([address] 06/16/2015    NSTEMI (non-ST elevated myocardial infarction) ([address]     On home oxygen therapy     3 Lpm prn    Open wound of groin 12/19/2018    healed     BARBY on CPAP     Osteoarthritis     Otitis externa of left ear     Pancreatitis chronic     Persistent depressive disorder 11/19/2019    Renal insufficiency     proteinuria    Seizures ([address] 6/27/2019    Severe depression ([address] 09/25/2013    Spinal stenosis of lumbar region without neurogenic claudication 01/06/2016    MRI lumbar 12/30/15 L3-L4: There is broad-based bulging disc which appears protruding left laterally causing flattening of the ventral thecal sac. In addition, there is facet arthropathy with mild hypertrophic changes.  There is borderline central canal stenosis with  evidence of moderate left neural foraminal narrowing and mild right neural foramina narrowing.   L4-L5: There is broad-based protrud    Syncope 04/28/2017    Tinnitus of both ears 01/10/2017    Per ENT    Type 2 diabetes mellitus with stage 3 chronic kidney disease, with long-term current use of insulin ([facility] 2016    due to underlying condition with hyperosmolarity without coma    Type II or unspecified type diabetes mellitus without mention of complication, not stated as uncontrolled     uncontrolled    Uncontrolled type 2 diabetes mellitus with hyperglycemia ([facility] 7/15/2013    Unstable angina ([facility] 2021    Vitamin D deficiency     Wears glasses     for reading       Social History:    Social History     Tobacco Use    Smoking status: Former Smoker     Packs/day: 0.25     Years: 33.00     Pack years: 8.25     Types: Cigarettes     Start date: 1985     Quit date: 2020     Years since quittin.5    Smokeless tobacco: Former User     Types: Snuff     Quit date: 1995   Substance Use Topics    Alcohol use: No     Alcohol/week: 0.0 standard drinks       Pertinent Labs:    Lab Results   Component Value Date    LABA1C 8.8 2022    LABA1C 8.8 2022    LABA1C 7.7 (H) 10/23/2021     Lab Results   Component Value Date    CHOL 137 2021    TRIG 416 (H) 2021    HDL 30 (L) 2021     Lab Results   Component Value Date    CREATININE 1.39 (H) 2022    BUN 23 (H) 2022     2022    K 3.9 2022    CL 98 2022    CO2 29 2022     Lab Results   Component Value Date    ALT 11 02/10/2022         Wt Readings from Last 3 Encounters:   22 (!) 412 lb (186.9 kg)   22 (!) 419 lb (190.1 kg)   22 (!) 419 lb 3.2 oz (190.1 kg)       Current medications:  Prior to Admission medications    Medication Sig Start Date End Date Taking? Authorizing Provider   rosuvastatin (CRESTOR) 10 MG tablet Take 1 tablet by mouth nightly Stop pravastatin 22   Terri Olivia MD   oxyCODONE HCl (OXY-IR) 10 MG immediate release tablet Take 1 tablet by mouth every 8 hours as needed for Pain for up to 30 days.  22  Terri Olivia MD   mupradeep (BACTROBAN) 2 % cream Apply 3 times daily. 4/19/22 5/19/22  Shelva Cabot, MD   albuterol (PROVENTIL) (2.5 MG/3ML) 0.083% nebulizer solution USE THREE MILLILITERS VIA NEBULIZATION BY MOUTH EVERY 6 HOURS AS NEEDED FOR WHEEZING OR FOR SHORTNESS OF BREATH 4/12/22   Sriram Lozoya MD   escitalopram (LEXAPRO) 10 MG tablet  3/19/22   Historical Provider, MD   Continuous Blood Gluc Sensor (FREESTYLE CRISTINE 14 DAY SENSOR) MISC USE AS DIRECTED AND CHANGE EVERY 14 DAYS 3/14/22   Sriram Lozoya MD   insulin regular human (HUMULIN R) 500 UNIT/ML concentrated injection vial Patient using 0.60ml with breakfast, 0.55ml with lunch and 0.25 ml with dinner. 3/7/22   Sriram Lozoya MD   vitamin D3 (CHOLECALCIFEROL) 25 MCG (1000 UT) TABS tablet Take 1 tablet by mouth daily 3/2/22   Sriram Lozoya MD   nystatin (MYCOSTATIN) 155533 UNIT/GM powder Apply 2 times daily in the skin folds for long term.  3/2/22   Sriram Lozoya MD   chlorhexidine (HIBICLENS) 4 % external liquid Use once or twice a day to clean the left armpit, diluted in water and wash the left armpit with it 3/2/22   Sriram Lozoya MD   lisinopril (PRINIVIL;ZESTRIL) 2.5 MG tablet Take 1 tablet by mouth daily Dose decreased 3/2/2022 3/2/22   Sriram Lozoya MD   venlafaxine (EFFEXOR XR) 150 MG extended release capsule Take 1 capsule by mouth every morning Dose increased 3/2/2022 3/2/22   Sriram Lozoya MD   naloxone 4 MG/0.1ML LIQD nasal spray 1 spray by Nasal route as needed for Opioid Reversal Patient needs counseling  Patient not taking: Reported on 5/5/2022 2/27/22   Sriram Lozoya MD   gabapentin (NEURONTIN) 300 MG capsule TAKE ONE CAPSULE BY MOUTH THREE TIMES PER DAY 2/11/22 5/14/22  Sheri Henry MD   butalbital-acetaminophen-caffeine (FIORICET, [facility]) -46 MG per tablet Take 1 tablet by mouth every 8 hours as needed for Headaches 2/11/22   Sheri Henry MD   MAGNESIUM-OXIDE 400 (241.3 Mg) MG TABS tablet Take 1 tablet by mouth 2 times daily Frequency increased to 10/20/2022 2/10/22   Miky Lu MD   pantoprazole (PROTONIX) 40 MG tablet Take 1 tablet by mouth every morning (before breakfast) 1/28/22   Miky Lu MD   nystatin (MYCOSTATIN) 769900 UNIT/ML suspension Take 5 mLs by mouth 4 times daily Swish and swallow 1/19/22   Miky Lu MD   tiZANidine (ZANAFLEX) 4 MG tablet Take 1 tablet by mouth every 8 hours as needed (Muscle spasms) 12/23/21   Miky Lu MD   allopurinol (ZYLOPRIM) 300 MG tablet Take 1 tablet by mouth daily Dose increased 12/9/2021 . Stop if any rash develops 12/9/21   Miky Lu MD   FEROSUL 325 (65 Fe) MG tablet TAKE ONE TABLET BY MOUTH DAILY 12/1/21   Miky Lu MD   SPIRIVA RESPIMAT 2.5 MCG/ACT AERS inhaler INHALE TWO PUFFS BY MOUTH DAILY 12/1/21   Miky Lu MD   ADVAIR -21 MCG/ACT inhaler INHALE TWO PUFFS BY MOUTH TWICE A DAY 12/1/21   Miky Lu MD   metoprolol tartrate (LOPRESSOR) 50 MG tablet Take 1 tablet by mouth 2 times daily 11/17/21   Miky Lu MD   MUCUS RELIEF 600 MG extended release tablet  11/8/21   Stewart Henriquez MD   amitriptyline (ELAVIL) 50 MG tablet Take 1 po qhs 11/11/21   Nico Kam MD   blood glucose monitor strips Test 3 times a day & as needed for symptoms of irregular blood glucose. Dispense sufficient amount for indicated testing frequency plus additional to accommodate PRN testing needs. One touch Ultra blue 11/1/21   Miky Lu MD   bumetanide (BUMEX) 1 MG tablet Take 1 tablet by mouth 2 times daily Dose decreased 10/23/2021  Patient taking differently: Take 2 mg by mouth 2 times daily Dose decreased 10/23/2021 10/23/21   Miky Lu MD   ammonium lactate (LAC-HYDRIN) 12 % lotion Apply topically daily.  10/14/21   Miky Lu MD   ROCKLATAN 0.02-0.005 % SOLN  10/7/21   Historical Provider, MD   epoetin leslie-epbx (RETACRIT) 3000 UNIT/ML SOLN injection Inject 1 mL into the skin three times a week  Patient not taking: Reported on 4/19/2022 10/4/21   Remi Laguna MD   Insulin Syringe-Needle U-100 31G X 5/16\" 1 ML MISC Use to subcutaneously inject insulin three times daily 9/22/21   Belvin Cabot, MD   docusate sodium (COLACE) 100 MG capsule Take 1 capsule by mouth 2 times daily For constipation 8/17/21   Belvin Cabot, MD   latanoprost (XALATAN) 0.005 % ophthalmic solution 1 drop nightly    Historical Provider, MD   clopidogrel (PLAVIX) 75 MG tablet Take 1 tablet by mouth daily 8/11/21   Belvin Cabot, MD   nitroGLYCERIN (NITROSTAT) 0.4 MG SL tablet DISSOLVE 1 TAB UNDER TONGUE FOR CHEST PAIN - IF PAIN REMAINS AFTER 5 MIN, CALL 911 AND REPEAT DOSE. MAX 3 TABS IN 15 MINUTES 8/2/21   Paige Bryant MD   PROAIR  (90 Base) MCG/ACT inhaler INHALE TWO PUFFS BY MOUTH EVERY 6 HOURS AS NEEDED FOR WHEEZING OR FOR SHORTNESS OF BREATH 4/13/21   Belvin Cabot, MD   clobetasol (TEMOVATE) 0.05 % ointment Apply topically 2 times daily for psoriasis 1/27/21   Belvin Cabot, MD   ketoconazole (NIZORAL) 2 % cream Apply twice a day for yeast infection in the skin folds, for 4 weeks 1/20/21   Belvin Cabot, MD   Lancets MISC Use to check blood sugar three times daily along with when necessary due to symptoms. 9/30/20   Sarahy Shell MD   vitamin B-12 (CYANOCOBALAMIN) 500 MCG tablet Take 1 tablet by mouth daily 7/13/20   Belvin Cabot, MD   Oxygen Tubing MISC by Does not apply route DX COPD. chronic respiratory failure 3/26/20   Belvin Cabot, MD   Respiratory Therapy Supplies (NEBULIZER/TUBING/MOUTHPIECE) KIT Dx COPD needs nebulizer supplies 2/20/20   Belvin Cabot, MD   ONE TOUCH ULTRASOFT LANCETS [facility] Patient to test blood sugar up to 4 times daily. 11/7/19   Sarahy Shell MD   Lancet Devices (LANCING DEVICE) MISC Provide patient with lancing device appropriate for his machine/lancing needles.  6/4/19 Carolina Jalloh MD   Handgarima [place]ard MISC by Does not apply route Can't walk greater than 200 feet. Expires in 5 years. 2/13/19   Paige Bryant MD   fluticasone (CUTIVATE) 0.05 % cream Apply topically 2 times daily  4/19/17   Historical Provider, MD   fluticasone (FLONASE) 50 MCG/ACT nasal spray 2 sprays by Nasal route daily  Patient taking differently: 2 sprays by Nasal route daily as needed (sinus symptoms)  1/16/17   Carolina Jalloh MD   Melatonin 10 MG TABS Take 10 mg by mouth nightly as needed (insomnia) 12/23/16   Carolina Jalloh MD   aspirin 81 MG EC tablet Take 81 mg by mouth daily. Historical Provider, MD       Immunizations:   Most Recent Immunizations   Administered Date(s) Administered    COVID-19, Fransisco Encarnacion, Primary or Immunocompromised, PF, 100mcg/0.5mL 12/15/2021    COVID-19, [place] top, DO NOT Dilute, Lamonte-Sucrose, 12+ yrs, PF, 30 mcg/0.3 mL dose 04/23/2022    DT (pediatric) 12/14/1998    Hepatitis B Adult (Engerix-B) 12/04/2019    Hepatitis B Adult (Recombivax HB) 12/04/2019    Influenza Virus Vaccine 10/12/2015    Influenza, MDCK Quadv, IM, PF (Flucelvax 2 yrs and older) 10/14/2021    Influenza, Quadv, IM, PF (6 mo and older Fluzone, Flulaval, Fluarix, and 3 yrs and older Afluria) 10/09/2020    Pneumococcal Polysaccharide (Xbdbefovl27) 05/23/2013    Tdap (Boostrix, Adacel) 09/12/2018    Zoster Recombinant (Shingrix) 02/14/2022       Home Blood Glucose Results:   Per patient's Freestyle Lianne report below             Patient's blood sugar is better controlled but could have better control during mid day and less hypoglycemia during nighttime. Assessment     A1c at goal:No: 8.8 (2/24/22)  Blood Pressure at goal: Yes  Weight at goal: No:    Physical activity: No  Physical activity at goal: No    Smoking Cessation: Yes    Cholesterol at target: Yes  Annual eye exam completed: Yes  Comprehensive Foot Exam Completed:  Yes  Annual urine albumin and serum creatinine: Yes    Statin: Yes    Appropriate?: Yes  Changes made: refill provided    ACE/ARB: Yes  Appropriate?: Yes  Changes made:     Aspirin: Yes  Appropriate?: Yes  Changes made:     Eating patterns:    [x]  My plate    []  Mediterranean diet   []  Low sodium   []  DASH diet   [x]  Portion control   [x]  Reduced calorie    []  Fast food / Restaurant  []  High glycemic index foods   []  Sugary beverages   []  Other     Comment: see above    Current Medications Affecting Diabetes:  Humulin R 500    Compliant:No  Barriers to medication therapy: anxiety / depression    Medications attempted in the past:  Metformin - cardiologist took him off but patient unsure why  Januvia - PCP took him off but patient unsure why    Recent exacerbations / new problems:  Gout / Chest pain/pressure    Last office dictation reviewed: yes        type 2 DM under worsening control as evidenced by A1C 8.8 on 2/24/22 but blood sugars per Beverly Louisa is improving. Plan   Counseling at today's visit:     -Continued monitoring of blood glucose with use of Freestyle Lianne. Call with any issues with sensor. Bring data cord to each visit.      -Continue taking insulin on regular basis:  insulin at .6 at breakfast time, 55 at lunch time and .25 with dinner. Only increase to 0.3 with dinner if blood sugar above 250. Take mealtime insulin at beginning of meal.    Call dermatologist for appointment. Physician Follow-up:   Dr Michel Bernal     Medication Management Follow-up:   Diabetes Service   8 weeks (4 weeks after Dr. Michel Bernal (endocrinology)    Electronically signed by Nano Chu, [address] [place] [address] on 5/11/2022 at 10:42 AM     For Pharmacy [id number] in place:   Yes   Recommendation Provided To: Patient/Caregiver: 4 via In person   Intervention Detail: Dose Adjustment: 1, reason: Therapy Optimization, New Rx: 1, reason: Needs Additional Therapy, Refill(s) Provided and Scheduled Appointment   Intervention Accepted By: Patient/Caregiver: 4   Time Spent (min): 45     Yesenia Rizvi RPH,Pharm. D,, BCPS, [facility]  5/11/2022  10:42 AM

## 2022-05-12 NOTE — TELEPHONE ENCOUNTER
Refills sent to [facility] for Chew sensors and glucose testing strips. Yesenia Rizvi RPH,Pharm. D,, BCPS, CACP  5/11/2022  8:26 PM

## 2022-05-19 ENCOUNTER — TELEPHONE (OUTPATIENT)
Dept: FAMILY MEDICINE CLINIC | Age: 57
End: 2022-05-19

## 2022-05-19 NOTE — TELEPHONE ENCOUNTER
I received notification from [address] [place] [address], will need to scan the order in the computer  He needs new visit for hospital bed order and NEW notes, per request because they are older than 6 months.     Hospital bed order, they need new note stating that the patient needs to have head and feet elevated at least 30 degrees and requires positioning of the body not feasible in an ordinary bed along with the new order which is received today we will scan it in media    Please make an appointment for patient whenever first available, okay for video visit

## 2022-05-20 ENCOUNTER — TELEPHONE (OUTPATIENT)
Dept: FAMILY MEDICINE CLINIC | Age: 57
End: 2022-05-20

## 2022-05-20 NOTE — TELEPHONE ENCOUNTER
I placed form back and there is a telephone encounter from me too  Needs appointment for bed      Future Appointments   Date Time Provider William Hernandez   6/7/2022 11:30 AM [facility] CHF CLINIC RM [address]   7/6/2022 10:10 AM ST MEDICATION MGMT [facility] MED MGMT [facility]   11/1/2022 10:15 AM Hector Su MD  [facility]   3/7/2023  9:00 AM Killian Gutierres MD [facility]

## 2022-05-20 NOTE — TELEPHONE ENCOUNTER
----- Message from Edgard To sent at 5/19/2022  9:03 AM EDT -----  Subject: Message to Provider    QUESTIONS  Information for Provider? Ruby Blanchard is calling in on the behalf of the pt to   send a message to dr. Thomas Basurto in regards to a order sent in around   10/15/2021 for a bariatric hospital bed and states at this time there is a   need for a new order for to bed sent for the pt ;Ruby Blanchard will fax over the   forms as of today 5/19/2022 . Ruby clancy [phone] fax ? [phone]  ---------------------------------------------------------------------------  --------------  Sue Meo INFO  What is the best way for the office to contact you? OK to leave message on   voicemail  Preferred Call Back Phone Number? [phone]  ---------------------------------------------------------------------------  --------------  SCRIPT ANSWERS  Relationship to Patient? Third Party  Third Party Type? Durable Medical Equipment?    Representative Name? [id number]

## 2022-05-23 RX ORDER — AMITRIPTYLINE HYDROCHLORIDE 50 MG/1
TABLET, FILM COATED ORAL
Qty: 90 TABLET | Refills: 0 | Status: SHIPPED | OUTPATIENT
Start: 2022-05-23 | End: 2022-08-23

## 2022-05-23 NOTE — TELEPHONE ENCOUNTER
Pharmacy requesting refill of amitriptyline 50 mg.      Medication active on med list yes      Date of last Rx: 11/11/2021 with 5 refills          verified by GLORIA MERCER      Date of last appointment 2/11/2021    Next Visit Date:  None, message left to schedule follow up appointment.

## 2022-05-31 ENCOUNTER — TELEMEDICINE (OUTPATIENT)
Dept: FAMILY MEDICINE CLINIC | Age: 57
End: 2022-05-31
Payer: COMMERCIAL

## 2022-05-31 DIAGNOSIS — J96.11 CHRONIC RESPIRATORY FAILURE WITH HYPOXIA (HCC): ICD-10-CM

## 2022-05-31 DIAGNOSIS — N18.30 BENIGN HYPERTENSIVE HEART AND CKD, STAGE 3 (GFR 30-59), W CHF (HCC): ICD-10-CM

## 2022-05-31 DIAGNOSIS — Z79.4 TYPE 2 DIABETES MELLITUS WITH DIABETIC POLYNEUROPATHY, WITH LONG-TERM CURRENT USE OF INSULIN (HCC): ICD-10-CM

## 2022-05-31 DIAGNOSIS — M96.1 POSTLAMINECTOMY SYNDROME OF LUMBAR REGION: ICD-10-CM

## 2022-05-31 DIAGNOSIS — G47.33 OSA ON CPAP: ICD-10-CM

## 2022-05-31 DIAGNOSIS — M51.36 DDD (DEGENERATIVE DISC DISEASE), LUMBAR: ICD-10-CM

## 2022-05-31 DIAGNOSIS — E11.42 TYPE 2 DIABETES MELLITUS WITH DIABETIC POLYNEUROPATHY, WITH LONG-TERM CURRENT USE OF INSULIN (HCC): ICD-10-CM

## 2022-05-31 DIAGNOSIS — M54.42 CHRONIC MIDLINE LOW BACK PAIN WITH LEFT-SIDED SCIATICA: ICD-10-CM

## 2022-05-31 DIAGNOSIS — I73.9 PVD (PERIPHERAL VASCULAR DISEASE) (HCC): ICD-10-CM

## 2022-05-31 DIAGNOSIS — G89.29 CHRONIC MIDLINE LOW BACK PAIN WITH LEFT-SIDED SCIATICA: ICD-10-CM

## 2022-05-31 DIAGNOSIS — J44.9 CHRONIC OBSTRUCTIVE PULMONARY DISEASE, UNSPECIFIED COPD TYPE (HCC): ICD-10-CM

## 2022-05-31 DIAGNOSIS — M48.061 SPINAL STENOSIS OF LUMBAR REGION WITHOUT NEUROGENIC CLAUDICATION: ICD-10-CM

## 2022-05-31 DIAGNOSIS — I13.0 BENIGN HYPERTENSIVE HEART AND CKD, STAGE 3 (GFR 30-59), W CHF (HCC): ICD-10-CM

## 2022-05-31 DIAGNOSIS — E66.01 MORBID OBESITY WITH BMI OF 50.0-59.9, ADULT (HCC): ICD-10-CM

## 2022-05-31 DIAGNOSIS — Z99.89 OSA ON CPAP: ICD-10-CM

## 2022-05-31 DIAGNOSIS — I50.32 CHRONIC DIASTOLIC CONGESTIVE HEART FAILURE (HCC): Primary | ICD-10-CM

## 2022-05-31 PROCEDURE — 2022F DILAT RTA XM EVC RTNOPTHY: CPT | Performed by: FAMILY MEDICINE

## 2022-05-31 PROCEDURE — G8427 DOCREV CUR MEDS BY ELIG CLIN: HCPCS | Performed by: FAMILY MEDICINE

## 2022-05-31 PROCEDURE — 3017F COLORECTAL CA SCREEN DOC REV: CPT | Performed by: FAMILY MEDICINE

## 2022-05-31 PROCEDURE — 99214 OFFICE O/P EST MOD 30 MIN: CPT | Performed by: FAMILY MEDICINE

## 2022-05-31 PROCEDURE — 3052F HG A1C>EQUAL 8.0%<EQUAL 9.0%: CPT | Performed by: FAMILY MEDICINE

## 2022-05-31 RX ORDER — LISINOPRIL 2.5 MG/1
2.5 TABLET ORAL DAILY
Qty: 90 TABLET | Refills: 3 | Status: SHIPPED
Start: 2022-05-31 | End: 2022-09-26 | Stop reason: HOSPADM

## 2022-05-31 RX ORDER — VARENICLINE TARTRATE
KIT
COMMUNITY
Start: 2022-05-17 | End: 2022-09-06

## 2022-05-31 RX ORDER — OXYCODONE HYDROCHLORIDE 10 MG/1
10 TABLET ORAL EVERY 8 HOURS PRN
Qty: 90 TABLET | Refills: 0 | Status: SHIPPED | OUTPATIENT
Start: 2022-05-31 | End: 2022-06-29 | Stop reason: SDUPTHER

## 2022-05-31 ASSESSMENT — ENCOUNTER SYMPTOMS
SHORTNESS OF BREATH: 1
BACK PAIN: 1
VOMITING: 0
ABDOMINAL DISTENTION: 0
CONSTIPATION: 0
COUGH: 1
DIARRHEA: 0
NAUSEA: 0
WHEEZING: 0
ABDOMINAL PAIN: 0
APNEA: 1
CHEST TIGHTNESS: 0

## 2022-05-31 NOTE — PROGRESS NOTES
2022    TELEHEALTH EVALUATION -- Audio/Visual (During HURXF-99 public health emergency)      Golden Toussaint (:  1965) is a 61 y.o. male,Established patient, here for evaluation of the following chief complaint(s): Congestive Heart Failure, Hypertension, Diabetes, Back Pain, COPD (Hospital bed prescription), and Other        ASSESSMENT/PLAN:    1. Chronic diastolic congestive heart failure (HCC)  Worsening  Lab Results   Component Value Date    LVEF 46 2021    LVEFMODE Echo 2017     EF 55 to 60% on 2017  Recheck labs  Discussed low salt diet and BP and pulse monitoring. Continue CHF clinic monitoring  Continue current medications Bumex 2 mg twice a day, lisinopril 2.5 Mg daily, metoprolol 50 Mg twice a day  -     DME Order for Hospital Bed as OP  -     Brain Natriuretic Peptide; Future  -     Comprehensive Metabolic Panel; Future  -     Magnesium; Future  -     Phosphorus; Future  -     TSH; Future  -     Uric Acid; Future  2. Benign hypertensive heart and CKD, stage 3 (GFR 30-59), w CHF (HCC)  Stable chronic kidney disease stage III  GFR 53 on 2022, was 47 on 2022  Well controlled HTN. Continue current treatment Bumex 2 mg twice a day, lisinopril 2.5 Mg daily, metoprolol 50 Mg twice a day. Will recheck labs. -     DME Order for Hospital Bed as OP  -     Comprehensive Metabolic Panel; Future  -     Magnesium; Future  -     Phosphorus; Future  -     TSH; Future  -     Uric Acid; Future  3. Chronic obstructive pulmonary disease, unspecified COPD type ([facility] [address]  Worsening  Continue Spiriva daily, Advair 2 puffs twice a day and rinse mouth after use, albuterol as needed, oxygen at 3 L/min at nighttime and throughout the day as needed  -     DME Order for Hospital Bed as OP  4. BARBY on CPAP  Improved with CPAP, he benefits from CPAP, has oxygen attached to CPAP at 3 L/min and he can sleep  -     DME Order for Hospital Bed as OP  5.  Spinal stenosis of lumbar region without neurogenic claudication  S/p 4 back surgeries  -     DME Order for Hospital Bed as OP  -     oxyCODONE HCl (OXY-IR) 10 MG immediate release tablet; Take 1 tablet by mouth every 8 hours as needed for Pain for up to 30 days. , Disp-90 tablet, R-0Normal  6. Type 2 diabetes mellitus with diabetic polyneuropathy, with long-term current use of insulin (HCC)  Worsening  Has appointment coming up with Dr. Nicyk Rivero June 8  Update his labs    -     DME Order for Hospital Bed as OP  -     Comprehensive Metabolic Panel; Future  -     Hemoglobin A1C; Future  -     Magnesium; Future  -     Phosphorus; Future  -     TSH; Future  -     Uric Acid; Future  -     Vitamin B12 & Folate; Future  -advised home blood glucose testing multiple times throughout the day 4-6 times at least, has floresita  -daily feet exam, Foot care: advised to wash feet daily, pat dry and apply lotion at night, avoiding between toes. Need to look at feet daily and report to a physician any signs of inflammation or skin damage  -annual dilated eye exam  -Low carb, low fat diet, increase fruits and vegetables, and exercise 4-5 times a day 30-40 minutes a day discussed  -continue current treatment  -continue Aspirin   -continue ACEI lisinopril small dosage, and statin Crestor 10 mg  On gabapentin per neurologist.    7. PVD (peripheral vascular disease) ([address]  Stable  Continue baby aspirin 81 mg, smoking cessation, and Crestor 10 mg daily  -     DME Order for Hospital Bed as OP  9. Chronic respiratory failure with hypoxia (HCC)  Improved with oxygen at 3 L/min at night and as needed throughout the day    SpO2 Readings from Last 4 Encounters:   04/19/22 95%   04/07/22 94%   03/02/22 93%   03/02/22 92%       -     DME Order for Hospital Bed as OP  10. Morbid obesity with BMI of 50.0-59.9, adult (HCC)  Improved  BMI 55.88 kg/M2  -     DME Order for Hospital Bed as OP  11.  Chronic midline low back pain with left-sided sciatica  Improved with medications, pain medication helps him do some of his ADLs and stay active inside of the house  -     oxyCODONE HCl (OXY-IR) 10 MG immediate release tablet; Take 1 tablet by mouth every 8 hours as needed for Pain for up to 30 days. , Disp-90 tablet, R-0Normal  Repositioning, stretching, discussed  -     DME Order for Hospital Bed as OP  He does have Narcan in the house, I reviewed the risk of cardiorespiratory depression of oxycodone with patient and his daughter, patient's daughter says she knows how to use the Narcan  Also taking Elavil 50 Mg at nighttime, gabapentin for polyneuropathy, heating pad, edibles THC, venlafaxine, tizanidine as needed    12. DDD (degenerative disc disease), lumbar  Likely worsening due to wear and tear nature of the disease.    -     oxyCODONE HCl (OXY-IR) 10 MG immediate release tablet; Take 1 tablet by mouth every 8 hours as needed for Pain for up to 30 days. , Disp-90 tablet, R-0Normal  -     DME Order for Hospital Bed as OP  13. Postlaminectomy syndrome of lumbar region  -     oxyCODONE HCl (OXY-IR) 10 MG immediate release tablet; Take 1 tablet by mouth every 8 hours as needed for Pain for up to 30 days. , Disp-90 tablet, R-0Normal  -     DME Order for Hospital Bed as OP  He does have Narcan in the house, I reviewed the risk of cardiorespiratory depression of oxycodone with patient and his daughter, patient's daughter says she knows how to use the Narcan  Also taking Elavil 50 Mg at nighttime, gabapentin for polyneuropathy, heating pad, edibles THC, venlafaxine, tizanidine as needed    Miguel Jon. was evaluated today and a DME order was entered for a semi-electric hospital bed because he requires assistance for positioning needs not possible in an ordinary bed, complexity of body positioning needs, requirement of elevation of head of bed more than 30 degrees for the diagnosis of CHF, sleep apnea, chronic respiratory failure, COPD.   Patient requires frequent and immediate positioning changes for relief of pain and care needs related to medical diagnoses. Patient needs variability of bed height requirements to perform patient transfers and for personal cares and sleeping. Current body weight is 412 pounds, BMI 55.88 kg/M2  . The need for this equipment was discussed with the patient and he understands and is in agreement. Controlled Substance Monitoring:    Acute and Chronic Pain Monitoring:   RX Monitoring 5/31/2022   Attestation -   Periodic Controlled Substance Monitoring Possible medication side effects, risk of tolerance/dependence & alternative treatments discussed. ;No signs of potential drug abuse or diversion identified. ;Assessed functional status. Chronic Pain > 50 MEDD -   Chronic Pain > 80 MEDD -         Miguel received counseling on the following healthy behaviors: nutrition, exercise, medication adherence, tobacco cessation and weight loss  Reviewed prior labs and health maintenance  Discussed use, benefit, and side effects of prescribed medications. Barriers to medication compliance addressed. Patient given educational materials - see patient instructions  All patient questions answered. Patient voiced understanding. The patient's past medical,surgical, social, and family history as well as his current medications and allergies were reviewed as documented in today's encounter. Medications, labs, diagnostic studies, consultations and follow-up as documented in this encounter. Return in about 4 months (around 9/30/2022) for Visit type diabetes, **POC A1C, DM2, HTN, HLP, CHF, COPD.     Data Unavailable    Future Appointments   Date Time Provider William Hernandez   6/7/2022 11:30 AM [facility] CHF CLINIC RM [address]   7/6/2022 10:10 AM [facility] MEDICATION MGMT [facility] MED [facility]MT [facility]   10/11/2022  9:30 AM Kyung Contreras MD [facility]   10/11/2022 11:20 AM Marielle Murrieta MD Neuro Spec [facility]   11/1/2022 10:15 AM Edgar Herndon MD [facility]   3/7/2023  9:00 AM Alvin Rodriguez MD [facility]         SUBJECTIVE/OBJECTIVE:  Louann Motta (:  1965) has requested an audio/video evaluation for the following concern(s):Congestive Heart Failure, Hypertension, Diabetes, Back Pain, COPD (Hospital bed prescription), and Other      Comes with his daughter, Sharee Bailey. Hypertension, chronic kidney disease stage III, CHF:    he  is not exercising and is adherent to low salt diet. Blood pressure is well controlled at home. Cardiac symptoms dyspnea, fatigue, lower extremity edema and orthopnea. Patient denies chest pain, chest pressure/discomfort, claudication, exertional chest pressure/discomfort, irregular heart beat, near-syncope, palpitations, paroxysmal nocturnal dyspnea, syncope and tachypnea. Cardiovascular risk factors: advanced age (older than 54 for men, 72 for women), diabetes mellitus, dyslipidemia, hypertension, male gender, obesity (BMI >= 30 kg/m2), sedentary lifestyle and smoking/ tobacco exposure. Use of agents associated with hypertension: none. History of target organ damage: angina/ prior myocardial infarction, chronic kidney disease, heart failure, peripheral artery disease and prior coronary revascularization. Patient has 1 stent. Congestive heart failure is worsening. He goes to CHF clinic. Lab Results   Component Value Date    LVEF 46 2021    LVEFMODE Echo 2017       blood pressure is Normal.  130/54 at previous appointment    BP Readings from Last 3 Encounters:   22 (!) 130/54   22 130/76   22 118/64        Pulse is Normal.    Pulse Readings from Last 3 Encounters:   22 80   22 78   22 84     Morbid obesity per BMI.   BMI 55.88 kg/M2  Weight is improving, he has lost 7 pounds in 1 month, Intentionally and unintentionally  Wt Readings from Last 3 Encounters:   22 (!) 412 lb (186.9 kg)   22 (!) 419 lb (190.1 kg)   22 (!) 419 lb 3.2 oz (190.1 kg)       COPD, with chronic respiratory failure, sleep apnea:  Current treatment includes short-acting beta agonist inhaler, nebulized beta agonist, combined beta agonist/steroid inhaler, anticholinergic inhaler, home 02-at 3 L/min at night, and sometimes throughout the day, which has been somewhat effective. Residual symptoms: chronic dyspnea and cough productive of gray sputum in small amounts. He denies purulent sputum, wheezing, chest pain. He requires his rescue inhaler 1 time(s) per day. Nicotine dependence. Recently received Chantix per clinical pharmacist.  Smoker, counseling given to quit smoking. Ready to quit: Yes  Counseling given: Yes    He says he uses oxygen at 3 l/min through CPAP and sometims during daytime  Patient says he is now able to use his CPAP, oxygen connected at CPAP at 3 L/min at night, also using oxygen throughout the day at 3 L/min as needed. Patient says he is sleeping better and has more energy. He does keep his head elevated at 30 degrees, but is hard in the usual bed    Patient has chronic low back pain, midline and shooting down to the left side, has known spinal stenosis, degenerative changes, history of 4 back surgeries  Intensity of pain is 8/10, ran out of pain medication last night, and did not have it yet this morning. Has Narcan, his daughter is able to verify that it is not , I reviewed with them how to use the Narcan if needed for signs of respiratory failure, I explained to him and his daughter that he is at a high risk to stop breathing due to taking opiates and having respiratory conditions, I suggested to change oxycodone or cut down, but the patient declines. He understands the risks. He is also using THC edibles, his anxiety has been worse due to his worsening conditions. He has good family support through his daughters and his wife. He is also on gabapentin per neurology for chronic pain and neuropathy.   He uses wheelchair, walker, cane, has shower chair, oxygen machine and CPAP. Has known type 2 diabetes mellitus with polyneuropathy  Has [place], has been checking home blood glucose 3-4 times a day or more   Home blood glucose 135 this morning   A1c worsening. He benefits from clinical pharmacist for improving diabetes control and monitoring    Per note from clinical pharmacist on 5/11/2022, [facility] he has had some hypoglycemic due to skipping meals. The download of the [place] showed 63% in target, 30% high, 3% very high, very low 4%    He denies having any further hypoglycemic events, trying to not skip meals. Apparently he has made appointment with Dr. Emili Krishnan on June 8, 2022. Patient says he has stopped drinking pop. Lab Results   Component Value Date    LABA1C 8.8 02/24/2022    LABA1C 8.8 02/24/2022    LABA1C 7.7 (H) 10/23/2021         Review of Systems   Constitutional: Positive for fatigue and unexpected weight change. Negative for activity change, appetite change, chills, diaphoresis and fever. HENT: Positive for hearing loss (hearing aids, improved). Eyes: Positive for visual disturbance (stable, chronic). Respiratory: Positive for apnea (on CPAP), cough and shortness of breath. Negative for chest tightness and wheezing. He is happy that he is able to use his CPAP, oxygen connected at CPAP at 3 L/min at night, also using oxygen throughout the day at 3 L/min as needed     Cardiovascular: Positive for leg swelling. Negative for chest pain and palpitations. Gastrointestinal: Negative for abdominal distention, abdominal pain, constipation, diarrhea, nausea and vomiting. Endocrine: Negative for cold intolerance, heat intolerance, polydipsia, polyphagia and polyuria. Genitourinary: Negative for difficulty urinating, dysuria, frequency and hematuria. Musculoskeletal: Positive for arthralgias, back pain and gait problem.         Has walker and cane at home, uses wheelchair when going to the appointments, one of his family members take him to the appointments    Skin: Negative for wound. All wounds are healed   Allergic/Immunologic: Positive for immunocompromised state (due to diabetes). Neurological: Positive for numbness (feet, numbness and burning in his feet since 2000). Hematological: Bruises/bleeds easily. Psychiatric/Behavioral: Positive for sleep disturbance. Negative for dysphoric mood, self-injury and suicidal ideas. The patient is nervous/anxious. Prior to Visit Medications    Medication Sig Taking? Authorizing Provider   oxyCODONE HCl (OXY-IR) 10 MG immediate release tablet Take 1 tablet by mouth every 8 hours as needed for Pain for up to 30 days. Yes Fito Maxwell MD   CHANTIX STARTING MONTH GUALBERTO 0.5 MG X 11 & 1 MG X 42 tablet  Yes Historical Provider, MD   amitriptyline (ELAVIL) 50 MG tablet TAKE ONE TABLET BY MOUTH EVERY NIGHT AT BEDTIME Yes Tony Russo MD   Continuous Blood Gluc Sensor (FREESTYLE CRISTINE 14 DAY SENSOR) MISC USE AS DIRECTED AND CHANGE EVERY 14 DAYS Yes iFto Maxwell MD   blood glucose monitor strips Test 3 times a day & as needed for symptoms of irregular blood glucose. Dispense sufficient amount for indicated testing frequency plus additional to accommodate PRN testing needs. One touch Ultra blue. To be used if Chew not working or to double check sugars. Yes Fito Maxwell MD   rosuvastatin (CRESTOR) 10 MG tablet Take 1 tablet by mouth nightly Stop pravastatin Yes Fito Maxwell MD   albuterol (PROVENTIL) (2.5 MG/3ML) 0.083% nebulizer solution USE THREE MILLILITERS VIA NEBULIZATION BY MOUTH EVERY 6 HOURS AS NEEDED FOR WHEEZING OR FOR SHORTNESS OF BREATH Yes Fito Maxwell MD   escitalopram (LEXAPRO) 10 MG tablet  Yes Historical Provider, MD   insulin regular human (HUMULIN R) 500 UNIT/ML concentrated injection vial Patient using 0.60ml with breakfast, 0.55ml with lunch and 0.25 ml with dinner.  Yes Fito Maxwell MD   vitamin D3 (CHOLECALCIFEROL) 25 MCG (1000 UT) TABS tablet Take 1 tablet by mouth daily Yes Tatiana Rocha MD   nystatin (MYCOSTATIN) 665468 UNIT/GM powder Apply 2 times daily in the skin folds for long term. Yes Tatiana Rocha MD   chlorhexidine (HIBICLENS) 4 % external liquid Use once or twice a day to clean the left armpit, diluted in water and wash the left armpit with it Yes Tatiana Rocha MD   lisinopril (PRINIVIL;ZESTRIL) 2.5 MG tablet Take 1 tablet by mouth daily Dose decreased 3/2/2022 Yes Tatiana Rocha MD   venlafaxine (EFFEXOR XR) 150 MG extended release capsule Take 1 capsule by mouth every morning Dose increased 3/2/2022 Yes Tatiana Rocha MD   naloxone 4 MG/0.1ML LIQD nasal spray 1 spray by Nasal route as needed for Opioid Reversal Patient needs counseling Yes Tatiana Rocha MD   butalbital-acetaminophen-caffeine (FIORICET, ESGIC) -40 MG per tablet Take 1 tablet by mouth every 8 hours as needed for Headaches Yes Elier Gonzalez MD   MAGNESIUM-OXIDE 400 (241.3 Mg) MG TABS tablet Take 1 tablet by mouth 2 times daily Frequency increased to 10/20/2022 Yes Tatiana Rocha MD   pantoprazole (PROTONIX) 40 MG tablet Take 1 tablet by mouth every morning (before breakfast) Yes Tatiana Rocha MD   nystatin (MYCOSTATIN) 281120 UNIT/ML suspension Take 5 mLs by mouth 4 times daily Swish and swallow Yes Tatiana Rocha MD   tiZANidine (ZANAFLEX) 4 MG tablet Take 1 tablet by mouth every 8 hours as needed (Muscle spasms) Yes Tatiana Rocha MD   allopurinol (ZYLOPRIM) 300 MG tablet Take 1 tablet by mouth daily Dose increased 12/9/2021 .   Stop if any rash develops Yes Tatiana Rocha MD   FEROSUL 325 (65 Fe) MG tablet TAKE ONE TABLET BY MOUTH DAILY Yes Tatiana Rocha MD   SPIRIVA RESPIMAT 2.5 MCG/ACT AERS inhaler INHALE TWO PUFFS BY MOUTH DAILY Yes Tatiana Rocha MD   ADVAIR -21 MCG/ACT inhaler INHALE TWO PUFFS BY MOUTH TWICE A DAY Yes Paige MD Manny   metoprolol tartrate (LOPRESSOR) 50 MG tablet Take 1 tablet by mouth 2 times daily Yes Belvin Cabot, MD   MUCUS RELIEF 600 MG extended release tablet  Yes Historical Provider, MD   bumetanide (BUMEX) 1 MG tablet Take 1 tablet by mouth 2 times daily Dose decreased 10/23/2021  Patient taking differently: Take 2 mg by mouth 2 times daily Dose decreased 10/23/2021 Yes Belvin Cabot, MD   ammonium lactate (LAC-HYDRIN) 12 % lotion Apply topically daily. Yes Belvin Cabot, MD   ROCKLATAN 0.02-0.005 % SOLN  Yes Historical Provider, MD   epoetin leslie-epbx (RETACRIT) 3000 UNIT/ML SOLN injection Inject 1 mL into the skin three times a week Yes Remi Laguna MD   Insulin Syringe-Needle U-100 31G X 5/16\" 1 ML MISC Use to subcutaneously inject insulin three times daily Yes Belvin Cabot, MD   docusate sodium (COLACE) 100 MG capsule Take 1 capsule by mouth 2 times daily For constipation Yes Belvin Cabot, MD   latanoprost (XALATAN) 0.005 % ophthalmic solution 1 drop nightly Yes Historical Provider, MD   clopidogrel (PLAVIX) 75 MG tablet Take 1 tablet by mouth daily Yes Belvin Cabot, MD   nitroGLYCERIN (NITROSTAT) 0.4 MG SL tablet DISSOLVE 1 TAB UNDER TONGUE FOR CHEST PAIN - IF PAIN REMAINS AFTER 5 MIN, CALL 911 AND REPEAT DOSE. MAX 3 TABS IN 15 MINUTES Yes Paige Bryant MD   PROAIR  (90 Base) MCG/ACT inhaler INHALE TWO PUFFS BY MOUTH EVERY 6 HOURS AS NEEDED FOR WHEEZING OR FOR SHORTNESS OF BREATH Yes Belvin Cabot, MD   clobetasol (TEMOVATE) 0.05 % ointment Apply topically 2 times daily for psoriasis Yes Belvin Cabot, MD   ketoconazole (NIZORAL) 2 % cream Apply twice a day for yeast infection in the skin folds, for 4 weeks Yes Belvin Cabot, MD   Lancets MISC Use to check blood sugar three times daily along with when necessary due to symptoms.  Yes Sarahy Shell MD   vitamin B-12 (CYANOCOBALAMIN) 500 MCG tablet Take 1 tablet by mouth daily Yes Paige Olamide Prado MD   Oxygen Tubing MISC by Does not apply route DX COPD. chronic respiratory failure Yes Yang Maldonado MD   Respiratory Therapy Supplies (NEBULIZER/TUBING/MOUTHPIECE) KIT Dx COPD needs nebulizer supplies Yes Yang Maldonado MD   ONE TOUCH ULTRASOFT LANCETS [address] [facility] Patient to test blood sugar up to 4 times daily. Yes Harshad Bowers MD   Lancet Devices (LANCING DEVICE) MISC Provide patient with lancing device appropriate for his machine/lancing needles. Yes Yang Maldonado MD   Handicap Placard MISC by Does not apply route Can't walk greater than 200 feet. Expires in 5 years. Yes Yang Maldonado MD   fluticasone (CUTIVATE) 0.05 % cream Apply topically 2 times daily  Yes Historical Provider, MD   fluticasone (FLONASE) 50 MCG/ACT nasal spray 2 sprays by Nasal route daily  Patient taking differently: 2 sprays by Nasal route daily as needed (sinus symptoms)  Yes Paige Bryant MD   Melatonin 10 MG TABS Take 10 mg by mouth nightly as needed (insomnia) Yes Yang Maldonado MD   aspirin 81 MG EC tablet Take 81 mg by mouth daily.  Yes Historical Provider, MD   gabapentin (NEURONTIN) 300 MG capsule TAKE ONE CAPSULE BY MOUTH THREE TIMES PER DAY  Donald Lozano MD       Social History     Tobacco Use    Smoking status: Current Some Day Smoker     Packs/day: 0.25     Years: 33.00     Pack years: 8.25     Types: Cigarettes     Start date: 1985     Last attempt to quit: 2020     Years since quittin.5    Smokeless tobacco: Former User     Types: Snuff     Quit date: 1995   Vaping Use    Vaping Use: Never used   Substance Use Topics    Alcohol use: No     Alcohol/week: 0.0 standard drinks    Drug use: Yes     Types: Marijuana Carin Kales)     Comment: Patient reports he quit using THC for 26 days on 2021, using THC gummies as of 22          PHYSICAL EXAMINATION:    Vital Signs: (As obtained by patient/caregiver or practitioner observation)  -vital signs stable and within normal limits except morbid obesity per BMI, BMI 55.88 kg/M2  Patient-Reported Vitals 5/31/2022   Patient-Reported Weight 412 pounds   Patient-Reported Height 6 ft   Patient-Reported Systolic 291   Patient-Reported Diastolic 54   Patient-Reported Pulse -   Patient-Reported Temperature -   Patient-Reported SpO2 -           Intensity of pain is 8 out of 10      Constitutional: [x] Appears well-developed and well-nourished [x] No apparent distress      [x] Abnormal-morbidly obese      Mental status  [x] Alert and awake  [x] Oriented to person/place/time [x]Able to follow commands      Eyes:  EOM    [x]  Normal  [] Abnormal-  Sclera  [x]  Normal  [] Abnormal -         Discharge [x]  None visible  [] Abnormal -    HENT:   [x] Normocephalic, atraumatic.   [] Abnormal   [x] Mouth/Throat: Mucous membranes are moist.     External Ears [x] Normal  [x] Abnormal-very hard of hearing    Neck: [x] No visualized mass     Pulmonary/Chest: [x] Respiratory effort normal.  [x] No visualized signs of difficulty breathing or respiratory distress        [x] Abnormal he is breathing through pursed lips       Musculoskeletal:   [] Normal gait with no signs of ataxia         [x] Normal range of motion of neck        [x] Abnormal-decreased range of motion from his lumbar spine, antalgic changing position from sitting to standing up, ambulates with walker at all times      Neurological:        [x] No Facial Asymmetry (Cranial nerve 7 motor function) (limited exam to video visit)          [x] No gaze palsy        [] Abnormal-            Skin:        [x] No significant exanthematous lesions or discoloration noted on facial skin         [] Abnormal-            Psychiatric:      [x] No Hallucinations       []Mood is normal      [x]Behavior is normal      [x]Judgment is normal      [x]Thought content is normal       [x] Abnormal-anxious  Other pertinent observable physical exam findings-none  Due to this being a TeleHealth encounter, evaluation of the following organ systems is limited: Vitals/Constitutional/EENT/Resp/CV/GI//MS/Neuro/Skin/Heme-Lymph-Imm. I personally reviewed most recent labs reviewed with the patient and all questions fully answered.    Chronic anemia  Chronic kidney disease stage III stable  Hyperglycemia controlled  High triglycerides    Otherwise labs within normal limits        Lab Results   Component Value Date    WBC 10.0 04/28/2022    HGB 11.7 (L) 04/28/2022    HCT 36.7 (L) 04/28/2022    MCV 81.6 04/28/2022     04/28/2022       Lab Results   Component Value Date     04/28/2022    K 3.9 04/28/2022    CL 98 04/28/2022    CO2 29 04/28/2022    BUN 23 04/28/2022    CREATININE 1.39 04/28/2022    GLUCOSE 149 04/28/2022    CALCIUM 9.6 04/28/2022        Lab Results   Component Value Date    ALT 11 02/10/2022    AST 14 02/10/2022    ALKPHOS 90 02/10/2022    BILITOT 0.33 02/10/2022       Lab Results   Component Value Date    TSH 0.40 02/28/2022       Lab Results   Component Value Date    CHOL 137 09/12/2021    CHOL 121 07/24/2021    CHOL 151 09/25/2020     Lab Results   Component Value Date    TRIG 416 (H) 09/12/2021    TRIG 213 (H) 07/24/2021    TRIG 180 (H) 09/25/2020     Lab Results   Component Value Date    HDL 30 (L) 09/12/2021    HDL 32 (L) 07/24/2021    HDL 33 (L) 09/25/2020     Lab Results   Component Value Date    LDLCHOLESTEROL      09/12/2021    LDLCHOLESTEROL 46 07/24/2021    LDLCHOLESTEROL 82 09/25/2020       Lab Results   Component Value Date    CHOLHDLRATIO 4.6 09/12/2021    CHOLHDLRATIO 3.8 07/24/2021    CHOLHDLRATIO 4.6 09/25/2020       Lab Results   Component Value Date    LABA1C 8.8 02/24/2022    LABA1C 8.8 02/24/2022    LABA1C 7.7 (H) 10/23/2021         Lab Results   Component Value Date    WYSTULWM55 1006 02/28/2022       Lab Results   Component Value Date    VITD25 38.7 02/28/2022       Orders Placed This Encounter   Medications    oxyCODONE HCl (OXY-IR) 10 MG immediate release tablet Sig: Take 1 tablet by mouth every 8 hours as needed for Pain for up to 30 days. Dispense:  90 tablet     Refill:  0     Reduce doses taken as pain becomes manageable       Orders Placed This Encounter   Procedures    Brain Natriuretic Peptide     Standing Status:   Future     Standing Expiration Date:   5/31/2023    Comprehensive Metabolic Panel     Standing Status:   Future     Standing Expiration Date:   7/28/2022    Hemoglobin A1C     Standing Status:   Future     Standing Expiration Date:   5/31/2023    Magnesium     Standing Status:   Future     Standing Expiration Date:   5/31/2023    Phosphorus     Standing Status:   Future     Standing Expiration Date:   5/31/2023    TSH     Standing Status:   Future     Standing Expiration Date:   5/31/2023    Uric Acid     Standing Status:   Future     Standing Expiration Date:   5/31/2023    Vitamin B12 & Folate     Standing Status:   Future     Standing Expiration Date:   7/28/2022    DME Order for Hospital Bed as OP     You must complete the order parameters below and add the medical necessity documentation for this DME in a separate note.     Heavy duty hospital bed with mattress and any type side rails patient weight 351 to 600 lbs    Current patient weight: 412 lbs   Current patient height: 6 ft   Diagnosis: Chronic diastolic congestive heart failure ([address]  (primary encounter diagnosis)  PVD (peripheral vascular disease) ([address]  Benign hypertensive heart and CKD, stage 3 (GFR 30-59), w CHF ([address]  Chronic obstructive pulmonary disease, unspecified COPD type ([address]  Type 2 diabetes mellitus with diabetic polyneuropathy, with long-term current use of insulin ([address]  Morbid obesity with BMI of 50.0-59.9, adult ([address]  BARBY on CPAP  Spinal stenosis of lumbar region without neurogenic claudication  Chronic back pain greater than 3 months duration  Chronic respiratory failure with hypoxia ([address]    Length of need: Lifetime    NPI: [id number]  [facility] [address]  [facility] [facility]  [address] [facility] [address]  Dept: [phone]  Dept Fax: [phone]       Medications Discontinued During This Encounter   Medication Reason    oxyCODONE HCl (OXY-IR) 10 MG immediate release tablet REORDER              Miguel LISBETH Tomasa Viveros, was evaluated through a synchronous (real-time) audio-video encounter. The patient (or guardian if applicable) is aware that this is a billable service, which includes applicable co-pays. The virtual visit was conducted with patient's (and/or legal guardian consent). Verbal consent to proceed has been obtained within the past 12 months. The visit was conducted pursuant to the emergency declaration under the [facility], [address] [place] [address] authority and the [employer] and [employer] General Act. Patient identification was verified    Patient was accompanied by his daughter Nolan Rueda. Provider was located at primary practice location. The patient was located at home, in a state where the provider was licensed to provide care. On this date 5/31/2022 I have spent 39 minutes reviewing previous notes, test results and face to face with the patient discussing the diagnosis and importance of compliance with the treatment plan as well as documenting on the day of the visit. --Karina Blanco MD on 5/31/2022 at 7:42 PM    An electronic signature was used to authenticate this note. No

## 2022-05-31 NOTE — PATIENT INSTRUCTIONS
New Updates for [facility] MyChart/ [employer] ([facility]) JERARDO    Thank you for choosing US to give you the best care! [facility] ([facility]) is always trying to think of new ways to help their patients. We are asking all patients to try out the new digital registration that is now available through your [facility] account or the new JER[facility]O, [employer] ([facility]). Via the jerardo you're now able to update your personal and registration information prior to your upcoming appointment. This will save you time once you arrive at the office to check-in, not to mention your information remains safe!! Many other perks come from signing up for an account, such as:   Requesting refills   Scheduling an appointment   Completing an [address] Sending a message to the office/provider   Having access to your medication list   Paying your bill/copay prior to your appointment   Scheduling your yearly mammogram   Review your test results    If you are not familiar with [facility] or the [employer] ([facility]) JERARDO, please ask one of us and we will be happy to answer any questions or help you set-up your account.       Your [facility] office,  Isabel

## 2022-06-06 ENCOUNTER — PATIENT MESSAGE (OUTPATIENT)
Dept: FAMILY MEDICINE CLINIC | Age: 57
End: 2022-06-06

## 2022-06-06 ENCOUNTER — HOSPITAL ENCOUNTER (OUTPATIENT)
Age: 57
Discharge: HOME | End: 2022-06-06
Payer: COMMERCIAL

## 2022-06-06 DIAGNOSIS — Z79.4 TYPE 2 DIABETES MELLITUS WITH DIABETIC POLYNEUROPATHY, WITH LONG-TERM CURRENT USE OF INSULIN (HCC): ICD-10-CM

## 2022-06-06 DIAGNOSIS — D50.0 IRON DEFICIENCY ANEMIA DUE TO CHRONIC BLOOD LOSS: ICD-10-CM

## 2022-06-06 DIAGNOSIS — N18.30 BENIGN HYPERTENSIVE HEART AND CKD, STAGE 3 (GFR 30-59), W CHF (HCC): ICD-10-CM

## 2022-06-06 DIAGNOSIS — M48.061 SPINAL STENOSIS OF LUMBAR REGION WITHOUT NEUROGENIC CLAUDICATION: ICD-10-CM

## 2022-06-06 DIAGNOSIS — E11.42 TYPE 2 DIABETES MELLITUS WITH DIABETIC POLYNEUROPATHY, WITH LONG-TERM CURRENT USE OF INSULIN (HCC): ICD-10-CM

## 2022-06-06 DIAGNOSIS — M1A.30X0 CHRONIC GOUT DUE TO RENAL IMPAIRMENT WITHOUT TOPHUS, UNSPECIFIED SITE: ICD-10-CM

## 2022-06-06 DIAGNOSIS — M54.9 CHRONIC BACK PAIN GREATER THAN 3 MONTHS DURATION: ICD-10-CM

## 2022-06-06 DIAGNOSIS — I13.0 BENIGN HYPERTENSIVE HEART AND CKD, STAGE 3 (GFR 30-59), W CHF (HCC): ICD-10-CM

## 2022-06-06 DIAGNOSIS — G89.29 CHRONIC BACK PAIN GREATER THAN 3 MONTHS DURATION: ICD-10-CM

## 2022-06-06 DIAGNOSIS — J44.9 CHRONIC OBSTRUCTIVE PULMONARY DISEASE, UNSPECIFIED COPD TYPE (HCC): ICD-10-CM

## 2022-06-06 DIAGNOSIS — I50.32 CHRONIC DIASTOLIC CONGESTIVE HEART FAILURE (HCC): ICD-10-CM

## 2022-06-06 LAB
ABSOLUTE EOS #: 0.3 K/UL (ref 0–0.4)
ABSOLUTE LYMPH #: 0.8 K/UL (ref 1–4.8)
ABSOLUTE MONO #: 0.5 K/UL (ref 0.1–1.3)
ALBUMIN SERPL-MCNC: 3.6 G/DL (ref 3.5–5.2)
ALP BLD-CCNC: 90 U/L (ref 40–129)
ALT SERPL-CCNC: 12 U/L (ref 5–41)
ANION GAP SERPL CALCULATED.3IONS-SCNC: 14 MMOL/L (ref 9–17)
AST SERPL-CCNC: 14 U/L
BASOPHILS # BLD: 0 % (ref 0–2)
BASOPHILS ABSOLUTE: 0 K/UL (ref 0–0.2)
BILIRUB SERPL-MCNC: 0.25 MG/DL (ref 0.3–1.2)
BUN BLDV-MCNC: 31 MG/DL (ref 6–20)
CALCIUM SERPL-MCNC: 9.2 MG/DL (ref 8.6–10.4)
CHLORIDE BLD-SCNC: 99 MMOL/L (ref 98–107)
CO2: 27 MMOL/L (ref 20–31)
CREAT SERPL-MCNC: 1.61 MG/DL (ref 0.7–1.2)
EOSINOPHILS RELATIVE PERCENT: 3 % (ref 0–4)
ESTIMATED AVERAGE GLUCOSE: 177 MG/DL
FERRITIN: 88 NG/ML (ref 30–400)
FOLATE: 14.8 NG/ML
GFR AFRICAN AMERICAN: 54 ML/MIN
GFR NON-AFRICAN AMERICAN: 44 ML/MIN
GFR SERPL CREATININE-BSD FRML MDRD: ABNORMAL ML/MIN/{1.73_M2}
GLUCOSE BLD-MCNC: 212 MG/DL (ref 70–99)
HBA1C MFR BLD: 7.8 % (ref 4–6)
HCT VFR BLD CALC: 32.8 % (ref 41–53)
HEMOGLOBIN: 10.8 G/DL (ref 13.5–17.5)
IRON SATURATION: 15 % (ref 20–55)
IRON: 44 UG/DL (ref 59–158)
LYMPHOCYTES # BLD: 8 % (ref 24–44)
MAGNESIUM: 1.8 MG/DL (ref 1.6–2.6)
MCH RBC QN AUTO: 27.1 PG (ref 26–34)
MCHC RBC AUTO-ENTMCNC: 33 G/DL (ref 31–37)
MCV RBC AUTO: 82.1 FL (ref 80–100)
MONOCYTES # BLD: 5 % (ref 1–7)
PDW BLD-RTO: 17.7 % (ref 11.5–14.9)
PHOSPHORUS: 3.3 MG/DL (ref 2.5–4.5)
PLATELET # BLD: 198 K/UL (ref 150–450)
PMV BLD AUTO: 9 FL (ref 6–12)
POTASSIUM SERPL-SCNC: 3.9 MMOL/L (ref 3.7–5.3)
PRO-BNP: 145 PG/ML
RBC # BLD: 3.99 M/UL (ref 4.5–5.9)
SEG NEUTROPHILS: 84 % (ref 36–66)
SEGMENTED NEUTROPHILS ABSOLUTE COUNT: 8.1 K/UL (ref 1.3–9.1)
SODIUM BLD-SCNC: 140 MMOL/L (ref 135–144)
TOTAL IRON BINDING CAPACITY: 301 UG/DL (ref 250–450)
TOTAL PROTEIN: 6.7 G/DL (ref 6.4–8.3)
TSH SERPL DL<=0.05 MIU/L-ACNC: 1.51 UIU/ML (ref 0.3–5)
UNSATURATED IRON BINDING CAPACITY: 257 UG/DL (ref 112–347)
URIC ACID: 7.5 MG/DL (ref 3.4–7)
VITAMIN B-12: 1084 PG/ML (ref 232–1245)
WBC # BLD: 9.7 K/UL (ref 3.5–11)

## 2022-06-06 PROCEDURE — 84100 ASSAY OF PHOSPHORUS: CPT

## 2022-06-06 PROCEDURE — 82746 ASSAY OF FOLIC ACID SERUM: CPT

## 2022-06-06 PROCEDURE — 84550 ASSAY OF BLOOD/URIC ACID: CPT

## 2022-06-06 PROCEDURE — 83036 HEMOGLOBIN GLYCOSYLATED A1C: CPT

## 2022-06-06 PROCEDURE — 36415 COLL VENOUS BLD VENIPUNCTURE: CPT

## 2022-06-06 PROCEDURE — 85025 COMPLETE CBC W/AUTO DIFF WBC: CPT

## 2022-06-06 PROCEDURE — 82728 ASSAY OF FERRITIN: CPT

## 2022-06-06 PROCEDURE — 83735 ASSAY OF MAGNESIUM: CPT

## 2022-06-06 PROCEDURE — 84443 ASSAY THYROID STIM HORMONE: CPT

## 2022-06-06 PROCEDURE — 83550 IRON BINDING TEST: CPT

## 2022-06-06 PROCEDURE — 83540 ASSAY OF IRON: CPT

## 2022-06-06 PROCEDURE — 82607 VITAMIN B-12: CPT

## 2022-06-06 PROCEDURE — 80053 COMPREHEN METABOLIC PANEL: CPT

## 2022-06-06 PROCEDURE — 83880 ASSAY OF NATRIURETIC PEPTIDE: CPT

## 2022-06-06 RX ORDER — ALBUTEROL SULFATE 90 UG/1
AEROSOL, METERED RESPIRATORY (INHALATION)
Qty: 8.5 G | Refills: 3 | Status: SHIPPED | OUTPATIENT
Start: 2022-06-06

## 2022-06-06 RX ORDER — ALLOPURINOL 300 MG/1
300 TABLET ORAL DAILY
Qty: 90 TABLET | Refills: 1 | Status: SHIPPED | OUTPATIENT
Start: 2022-06-06 | End: 2022-09-26 | Stop reason: SDUPTHER

## 2022-06-06 RX ORDER — TIZANIDINE 4 MG/1
TABLET ORAL
Qty: 90 TABLET | Refills: 3 | Status: SHIPPED | OUTPATIENT
Start: 2022-06-06 | End: 2022-10-28 | Stop reason: SDUPTHER

## 2022-06-06 NOTE — RESULT ENCOUNTER NOTE
Noted  Future Appointments  6/7/2022   10:15 AM   Erwin Pearson DPM     [facility] WND CAR        [facility]  6/7/2022   11:30 AM   [facility]  [address]  7/6/2022   10:10 AM   CZ MEDICATION MGMT       [facility] MED MGMT        [facility]  10/11/2022 9:30 AM    [facility]rot[facility]     [facility]  10/11/2022 11:20 AM   Dia Toussaint MD Neuro Spec          [facility]  11/1/2022  10:15 AM   Omar Teague MD     [facility]        [facility]  3/7/2023   9:00 AM    Destiny Trotter MD     [facility]

## 2022-06-06 NOTE — TELEPHONE ENCOUNTER
Please Approve or Refuse.   Send to Pharmacy per Pt's Request: sherri     Next Visit Date:  10/11/2022   Last Visit Date: 5/31/2022    Hemoglobin A1C (%)   Date Value   06/06/2022 7.8 (H)   02/24/2022 8.8   02/24/2022 8.8             ( goal A1C is < 7)   BP Readings from Last 3 Encounters:   05/05/22 (!) 130/54   04/19/22 130/76   04/07/22 118/64          (goal 120/80)  BUN   Date Value Ref Range Status   06/06/2022 31 (H) 6 - 20 mg/dL Final     CREATININE   Date Value Ref Range Status   06/06/2022 1.61 (H) 0.70 - 1.20 mg/dL Final     Potassium   Date Value Ref Range Status   06/06/2022 3.9 3.7 - 5.3 mmol/L Final

## 2022-06-06 NOTE — RESULT ENCOUNTER NOTE
Please notify patient: Improved diabetes hemoglobin A1c 7.8 , blood glucose 212  Slightly worsening chronic kidney disease stage III  Uric acid is still high, but improving, needs to continue with allopurinol, I did refill the allopurinol today  Otherwise labs within normal limits  continue current treatment    Future Appointments  6/7/2022   10:15 AM   JASIEL Coker WND CAR        [facility]  6/7/2022   11:30 AM   [facility] CHF CLIN[facility]  [address]  7/6/2022   10:10 AM   [facility] MEDICATION MGMT       [facility] MED MGMT        [facility]  10/11/2022 9:30 AM    Miriam Heath MD     [facility]  10/11/2022 11:20 AM   Milana Farley MD Neuro Spec          [facility]  11/1/2022  10:15 AM   Lisset Schaefer MD     [facility]        [facility]  3/7/2023   9:00 AM    Miriam Heath MD     [facility]

## 2022-06-07 ENCOUNTER — HOSPITAL ENCOUNTER (OUTPATIENT)
Dept: OTHER | Age: 57
Discharge: HOME | End: 2022-06-07
Payer: COMMERCIAL

## 2022-06-07 ENCOUNTER — HOSPITAL ENCOUNTER (OUTPATIENT)
Dept: WOUND CARE | Age: 57
Discharge: HOME | End: 2022-06-07
Payer: COMMERCIAL

## 2022-06-07 VITALS
RESPIRATION RATE: 24 BRPM | HEART RATE: 108 BPM | DIASTOLIC BLOOD PRESSURE: 65 MMHG | TEMPERATURE: 98.6 F | WEIGHT: 315 LBS | BODY MASS INDEX: 42.66 KG/M2 | SYSTOLIC BLOOD PRESSURE: 150 MMHG | HEIGHT: 72 IN

## 2022-06-07 VITALS
WEIGHT: 315 LBS | HEART RATE: 100 BPM | OXYGEN SATURATION: 93 % | BODY MASS INDEX: 58.05 KG/M2 | SYSTOLIC BLOOD PRESSURE: 128 MMHG | RESPIRATION RATE: 22 BRPM | DIASTOLIC BLOOD PRESSURE: 64 MMHG

## 2022-06-07 DIAGNOSIS — E66.01 MORBID OBESITY WITH BMI OF 50.0-59.9, ADULT (HCC): ICD-10-CM

## 2022-06-07 DIAGNOSIS — I87.2 VENOUS INSUFFICIENCY OF LEFT LOWER EXTREMITY: ICD-10-CM

## 2022-06-07 DIAGNOSIS — L97.922 NON-HEALING ULCER OF LOWER LEG, LEFT, WITH FAT LAYER EXPOSED (HCC): ICD-10-CM

## 2022-06-07 DIAGNOSIS — Z79.4 TYPE 2 DIABETES MELLITUS WITH DIABETIC POLYNEUROPATHY, WITH LONG-TERM CURRENT USE OF INSULIN (HCC): ICD-10-CM

## 2022-06-07 DIAGNOSIS — E11.42 TYPE 2 DIABETES MELLITUS WITH DIABETIC POLYNEUROPATHY, WITH LONG-TERM CURRENT USE OF INSULIN (HCC): ICD-10-CM

## 2022-06-07 DIAGNOSIS — I87.2 VENOUS INSUFFICIENCY OF BOTH LOWER EXTREMITIES: ICD-10-CM

## 2022-06-07 DIAGNOSIS — L03.90 WOUND CELLULITIS: Primary | ICD-10-CM

## 2022-06-07 DIAGNOSIS — I87.2 STASIS DERMATITIS OF BOTH LEGS: ICD-10-CM

## 2022-06-07 PROCEDURE — 99213 OFFICE O/P EST LOW 20 MIN: CPT

## 2022-06-07 PROCEDURE — 87075 CULTR BACTERIA EXCEPT BLOOD: CPT

## 2022-06-07 PROCEDURE — 11042 DBRDMT SUBQ TIS 1ST 20SQCM/<: CPT

## 2022-06-07 PROCEDURE — 99211 OFF/OP EST MAY X REQ PHY/QHP: CPT

## 2022-06-07 PROCEDURE — 87176 TISSUE HOMOGENIZATION CULTR: CPT

## 2022-06-07 PROCEDURE — 99214 OFFICE O/P EST MOD 30 MIN: CPT | Performed by: PODIATRIST

## 2022-06-07 PROCEDURE — 11042 DBRDMT SUBQ TIS 1ST 20SQCM/<: CPT | Performed by: PODIATRIST

## 2022-06-07 PROCEDURE — 87070 CULTURE OTHR SPECIMN AEROBIC: CPT

## 2022-06-07 PROCEDURE — 87205 SMEAR GRAM STAIN: CPT

## 2022-06-07 RX ORDER — LIDOCAINE HYDROCHLORIDE 20 MG/ML
JELLY TOPICAL ONCE
Status: CANCELLED | OUTPATIENT
Start: 2022-06-07 | End: 2022-06-07

## 2022-06-07 RX ORDER — BETAMETHASONE DIPROPIONATE 0.05 %
OINTMENT (GRAM) TOPICAL ONCE
Status: CANCELLED | OUTPATIENT
Start: 2022-06-07 | End: 2022-06-07

## 2022-06-07 RX ORDER — CLOBETASOL PROPIONATE 0.5 MG/G
OINTMENT TOPICAL ONCE
Status: CANCELLED | OUTPATIENT
Start: 2022-06-07 | End: 2022-06-07

## 2022-06-07 RX ORDER — BACITRACIN, NEOMYCIN, POLYMYXIN B 400; 3.5; 5 [USP'U]/G; MG/G; [USP'U]/G
OINTMENT TOPICAL ONCE
Status: CANCELLED | OUTPATIENT
Start: 2022-06-07 | End: 2022-06-07

## 2022-06-07 RX ORDER — GENTAMICIN SULFATE 1 MG/G
OINTMENT TOPICAL ONCE
Status: CANCELLED | OUTPATIENT
Start: 2022-06-07 | End: 2022-06-07

## 2022-06-07 RX ORDER — LIDOCAINE 40 MG/G
CREAM TOPICAL ONCE
Status: CANCELLED | OUTPATIENT
Start: 2022-06-07 | End: 2022-06-07

## 2022-06-07 RX ORDER — GINSENG 100 MG
CAPSULE ORAL ONCE
Status: CANCELLED | OUTPATIENT
Start: 2022-06-07 | End: 2022-06-07

## 2022-06-07 RX ORDER — LIDOCAINE HYDROCHLORIDE 40 MG/ML
SOLUTION TOPICAL ONCE
Status: COMPLETED | OUTPATIENT
Start: 2022-06-07 | End: 2022-06-07

## 2022-06-07 RX ORDER — LIDOCAINE 50 MG/G
OINTMENT TOPICAL ONCE
Status: CANCELLED | OUTPATIENT
Start: 2022-06-07 | End: 2022-06-07

## 2022-06-07 RX ORDER — BACITRACIN ZINC AND POLYMYXIN B SULFATE 500; 1000 [USP'U]/G; [USP'U]/G
OINTMENT TOPICAL ONCE
Status: CANCELLED | OUTPATIENT
Start: 2022-06-07 | End: 2022-06-07

## 2022-06-07 RX ORDER — LIDOCAINE HYDROCHLORIDE 40 MG/ML
SOLUTION TOPICAL ONCE
Status: CANCELLED | OUTPATIENT
Start: 2022-06-07 | End: 2022-06-07

## 2022-06-07 RX ORDER — DOXYCYCLINE HYCLATE 100 MG/1
100 CAPSULE ORAL 2 TIMES DAILY
Qty: 28 CAPSULE | Refills: 0 | Status: SHIPPED | OUTPATIENT
Start: 2022-06-07 | End: 2022-06-21

## 2022-06-07 RX ADMIN — LIDOCAINE HYDROCHLORIDE 5 ML: 40 SOLUTION TOPICAL at 11:12

## 2022-06-07 ASSESSMENT — PAIN SCALES - GENERAL: PAINLEVEL_OUTOF10: 5

## 2022-06-07 ASSESSMENT — PAIN - FUNCTIONAL ASSESSMENT: PAIN_FUNCTIONAL_ASSESSMENT: ACTIVITIES ARE NOT PREVENTED

## 2022-06-07 ASSESSMENT — PAIN DESCRIPTION - DESCRIPTORS: DESCRIPTORS: SHARP;SHOOTING

## 2022-06-07 ASSESSMENT — ENCOUNTER SYMPTOMS
VOMITING: 0
NAUSEA: 0
DIARRHEA: 0
ROS SKIN COMMENTS: DRY SKIN

## 2022-06-07 ASSESSMENT — PAIN DESCRIPTION - PAIN TYPE: TYPE: CHRONIC PAIN

## 2022-06-07 ASSESSMENT — PAIN DESCRIPTION - FREQUENCY: FREQUENCY: CONTINUOUS

## 2022-06-07 ASSESSMENT — PAIN DESCRIPTION - ONSET: ONSET: ON-GOING

## 2022-06-07 ASSESSMENT — PAIN DESCRIPTION - ORIENTATION: ORIENTATION: LEFT

## 2022-06-07 ASSESSMENT — PAIN DESCRIPTION - LOCATION: LOCATION: LEG

## 2022-06-07 NOTE — PROGRESS NOTES
[employer] Application   Below Knee    NAME:  Federico Courtney. YOB: 1965  MEDICAL RECORD NUMBER:  [id number]  DATE:  6/7/2022     [x] Applied moisturizing agent to dry skin as needed.  [x] Appied primary and secondary dressing as ordered     [x] Applied Unna roll from toes to knee overlapping each time.  [x] Applied ace wrap or coban from toes to below the knee.  [x] Secured with tape and/or metal clips covered with tape.  [x] Instructed patient/caregiver to keep dressing dry and intact. DO NOT REMOVE DRESSING.  [x] Instructed pt/family/caregiver to report excessive draining, loose bandage, wet dressing, severe pain or tingling in toes.  [x] Applied [employer] dressing below the knee to Left lower leg(s)        Unna Boot(s) were applied per  Guidelines.      Electronically signed by Ernst Moore RN on 6/7/2022 at 11:15 AM

## 2022-06-07 NOTE — PROGRESS NOTES
Pt here for CHF Clinic visit. Labs drawn previously and reviewed with pt and his daughter. BNP stable at 145. pts weight up 9 lbs since last visit. Pt states he knows when he hasnt followed his cardiac/diabetic diet. Pt taking meds as prescribed and has followed up with PCP and other consults already. Lungs remain clear, chronic shortness of breath noted but pt able to talk in full sentences. Lower leg edema as noted, left leg wrapped per weekly wound care. Next CHF Clinic appointment scheduled for Tues Aug 9 at 11:00. Lab order sheet faxed. Verbally reviewed importance of medication compliance with patient; patient verbalized understanding. Discussed 2000mg/day sodium restricted diet; patient verbalized understanding. Moderate daily exercise encouraged as tolerated. Discussed rest breaks as needed; patient verbalized understanding. Patient instructed to weigh self at the same time of each day, using same clothes and same scale; reinforced teaching to monitor for 3-5 lb weight increase over 1-2 days, and to notify the CHF clinic at [phone] or physician office if weight change noted. Patient verbalized understanding. Risks of smoking discussed with the patient if applicable; patient strongly discouraged to smoke. Patient verbalized understanding. Signs and symptoms of CHF discussed with patient, such as feeling more tired than normal, feeling short of breath, coughing that increases when you lie down, sudden weight gain, swelling of your feet, legs or belly. Patient verbalized understanding to notify the CHF clinic at [phone] or physician office if these symptoms occur. Compliance with plan of care and further disease process causes discussed with patient, patient encouraged to keep all follow up appointments. Patient verbalized understanding.

## 2022-06-07 NOTE — PROGRESS NOTES
[address]   Progress Note and Procedure Note      Miguel Viveros MEDICAL RECORD NUMBER:  [id number]  AGE: 61 y.o. GENDER: male  : 1965  EPISODE DATE:  2022    Subjective:     Chief Complaint   Patient presents with    Wound Check     left lower leg         HISTORY of PRESENT ILLNESS HPI     Jerry Lane is a 61 y.o. male who presents today for wound/ulcer evaluation. Interval history: Erika Quintana presents for follow-up of left leg ulcerations. He states the ulcers were healed but recently developed an ulcer to the left anterior leg. No pus or drainage appreciated. He states he does not remember the cause of inury. He states he has been using compressive wraps. Relates that they are healed and has had no drainage. He does complain of dry skin on the legs and feet. History of Wound Context: Erika Quintana presents with a significant other for follow-up of right lower leg ulceration today. He has tolerated the Unna boots in both legs well since the last visit. He was seen in the ER and was prescribed doxycycline. Has been taking the doxycycline without any adverse reaction. He was instructed there to discontinue the Ceftin. PVRs were not completed due to history of COPD and the order was with exercise. Denies any new ulceration.     Ulcer Identification:  Ulcer Type: lymphedema  Contributing Factors: edema, lymphedema, diabetes, decreased mobility, obesity and arterial insufficiency          PAST MEDICAL HISTORY        Diagnosis Date    Acute kidney injury superimposed on CKD ([address] 4/10/2013    Acute on chronic congestive heart failure (HCC)     Acute on chronic kidney failure ([address] 2017    Acute on chronic respiratory failure ([address] 10/02/2018    Acute on chronic respiratory failure with hypoxia ([address] 2021    Adhesive capsulitis of left shoulder 2017    Anemia, normocytic normochromic 2021    Anxiety 10/02/2016    smokes marijuana for this    Arthropathy, unspecified, other specified sites 06/13/2013    Asthma     B12 deficiency     Bilateral lower leg cellulitis 02/17/2016    Blood in stool     CAD (coronary artery disease)     Cardiovascular stress test abnormal 2018    Cellulitis of both lower extremities 05/25/2017    Cellulitis of leg, left 07/20/2017    CHF (congestive heart failure), NYHA class III ([address] 08/14/2013    Chronic back pain     Chronic bronchitis (HCC)     Chronic headaches     was referred to neuro, testing scheduled    Chronic infective otitis externa 4/28/2017    Chronic kidney disease     Chronic malignant otitis externa of both ears 7/24/2020    Chronic respiratory failure (HCC)     was on vent    Chronic ulcer of left leg, with fat layer exposed ([address] 02/22/2019    healed    Class 2 severe obesity due to excess calories with serious comorbidity and body mass index (BMI) of 35.0 to 35.9 in adult (HCC)     (BMI 35.0-39.9 without comorbidity)    COPD exacerbation ([address] 11/02/2016    Diabetic neuropathy ([address] 08/14/2013    Displacement of lumbar intervertebral disc without myelopathy 06/13/2013    Ear infection     RIGHT    Elevated troponin     Essential hypertension     Facial cellulitis 2012    Fall 03/25/2017    GERD (gastroesophageal reflux disease)     Head injury     Hearing loss in right ear     pencil pierced ear as a child    Hepatic steatosis 12/03/2015    History of general anesthesia complication     has woke up during surgery under anesthesia    History of rib fracture 12/03/2015    Chronic     Hyperlipidemia     Hypersomnia     can go multiple days without sleeping    Hypertension     Insomnia     Intolerance of continuous positive airway pressure (CPAP) ventilation 07/20/2017    Iron deficiency     Localized rash     gets frequently in axilla, groin, in any fold, on several topical treatments for this    Lymphedema of both lower extremities 4/27/2021    Magnesium deficiency     Mastoiditis of left side Mixed conductive and sensorineural hearing loss of both ears 01/10/2017    Per ENT    Mixed type COPD (chronic obstructive pulmonary disease) ([address]     On home O2, multiple inhlaers, nebulizer    Moderate recurrent major depression ([address] 10/02/2016    Morbid obesity with BMI of 45.0-49.9, adult ([address] 06/16/2015    NSTEMI (non-ST elevated myocardial infarction) ([address]     On home oxygen therapy     3 Lpm prn    Open wound of groin 12/19/2018    healed     BARBY on CPAP     Osteoarthritis     Otitis externa of left ear     Pancreatitis chronic     Persistent depressive disorder 11/19/2019    Renal insufficiency     proteinuria    Seizures ([address] 6/27/2019    Severe depression ([address] 09/25/2013    Spinal stenosis of lumbar region without neurogenic claudication 01/06/2016    MRI lumbar 12/30/15 L3-L4: There is broad-based bulging disc which appears protruding left laterally causing flattening of the ventral thecal sac. In addition, there is facet arthropathy with mild hypertrophic changes. There is borderline central canal stenosis with  evidence of moderate left neural foraminal narrowing and mild right neural foramina narrowing.    L4-L5: There is broad-based protrud    Syncope 04/28/2017    Tinnitus of both ears 01/10/2017    Per ENT    Type 2 diabetes mellitus with stage 3 chronic kidney disease, with long-term current use of insulin ([address] 12/26/2016    due to underlying condition with hyperosmolarity without coma    Type II or unspecified type diabetes mellitus without mention of complication, not stated as uncontrolled     uncontrolled    Uncontrolled type 2 diabetes mellitus with hyperglycemia ([address] 7/15/2013    Unstable angina ([address] 9/11/2021    Vitamin D deficiency     Wears glasses     for reading       PAST SURGICAL HISTORY    Past Surgical History:   Procedure Laterality Date    BACK SURGERY   (x 4) 2000,.12/2011.2/2012     Dr Denisa Andrade last 2 surg    CARDIAC CATHETERIZATION  04/23/2018    PATENT OM STENT COLONOSCOPY  2015    hemorrhoids, poor prep, not done    COLONOSCOPY      COLONOSCOPY N/A 2021    COLONOSCOPY POLYPECTOMY SNARE/COLD BIOPSY/HOT BIOPSY/CLIP APPLICATION X1 performed by Tommy Gibson MD at [address]  03/2013    x 1    EYE SURGERY      HAND TENDON SURGERY Left     thumb tendon repair    INTRACAPSULAR CATARACT EXTRACTION Right 2019    EYE CATARACT EMULSIFICATION IOL IMPLANT performed by Dima Murillo MD at [address] Left 2020    EYE CATARACT EMULSIFICATION IOL IMPLANT performed by Dima Murillo MD at [address] ARTHROSCOPY Left     NERVE BLOCK  2015    TENS unit    AK ESOPHAGOGASTRODUODENOSCOPY TRANSORAL DIAGNOSTIC N/A 2018    EGD ESOPHAGOGASTRODUODENOSCOPY performed by Tommy Gibson MD at [facility] Bilateral 2012    Dr Patric Dance Age of Onset    Heart Disease Mother          age 64 from MI    High Blood Pressure Mother     Diabetes Mother     High Blood Pressure Father          age 80 from CKD and Lung Fibrosis    Kidney Disease Father     Heart Disease Sister     Heart Attack Sister     Obesity Sister     Diabetes Sister     Asthma Sister     Kidney Disease Sister         Passed        SOCIAL HISTORY    Social History     Tobacco Use    Smoking status: Former Smoker     Packs/day: 0.25     Years: 33.00     Pack years: 8.25     Types: Cigarettes     Start date: 1985     Quit date: 2020     Years since quittin.5    Smokeless tobacco: Former User     Types: Snuff     Quit date: 1995   Vaping Use    Vaping Use: Never used   Substance Use Topics    Alcohol use: No     Alcohol/week: 0.0 standard drinks    Drug use: Yes     Types: Marijuana [employer] [place])     Comment: Patient reports he quit using THC for 26 days on 2021, using THC gummies as of 2/24/22       ALLERGIES    Allergies   Allergen Reactions    Levofloxacin Anaphylaxis     Patient reports needing epinephrine \"about 5 or 6 months ago\" for anaphylaxis (itching, hives, SOB/swelling) after receiving Levofloxacin. Previous report from 08/20/2012: Nausea/Vomiting    Lorazepam      Falls      Nsaids      CHF&CKD    Prozac [Fluoxetine Hcl] Other (See Comments)     Pt started with seizures after started taking. Vancomycin Other (See Comments)     Itching, SOB, emesis upon infusion in ED 6/12/2019. Patient states he has had vancomycin \"a number of times\" before without issue. couldn't breath and talk, throat closed       MEDICATIONS    Current Outpatient Medications on File Prior to Encounter   Medication Sig Dispense Refill    tiZANidine (ZANAFLEX) 4 MG tablet TAKE ONE TABLET BY MOUTH EVERY 8 HOURS AS NEEDED FOR MUSCLE SPASMS 90 tablet 3    allopurinol (ZYLOPRIM) 300 MG tablet Take 1 tablet by mouth daily Dose increased 12/9/2021 . Stop if any rash develops 90 tablet 1    albuterol sulfate  (90 Base) MCG/ACT inhaler INHALE TWO PUFFS BY MOUTH EVERY 6 HOURS AS NEEDED FOR WHEEZING OR FOR SHORTNESS OF BREATH 8.5 g 3    oxyCODONE HCl (OXY-IR) 10 MG immediate release tablet Take 1 tablet by mouth every 8 hours as needed for Pain for up to 30 days. 90 tablet 0    CHANTIX STARTING MONTH GUALBERTO 0.5 MG X 11 & 1 MG X 42 tablet       lisinopril (PRINIVIL;ZESTRIL) 2.5 MG tablet Take 1 tablet by mouth daily Dose decreased 3/2/2022 90 tablet 3    amitriptyline (ELAVIL) 50 MG tablet TAKE ONE TABLET BY MOUTH EVERY NIGHT AT BEDTIME 90 tablet 0    Continuous Blood Gluc Sensor (FREESTYLE CRISTINE 14 DAY SENSOR) [facility] USE AS DIRECTED AND CHANGE EVERY 14 DAYS 2 each 5    blood glucose monitor strips Test 3 times a day & as needed for symptoms of irregular blood glucose. Dispense sufficient amount for indicated testing frequency plus additional to accommodate PRN testing needs. One touch Ultra blue.  To be used if Chew not working or to double check sugars. 300 strip 3    rosuvastatin (CRESTOR) 10 MG tablet Take 1 tablet by mouth nightly Stop pravastatin 90 tablet 3    albuterol (PROVENTIL) (2.5 MG/3ML) 0.083% nebulizer solution USE THREE MILLILITERS VIA NEBULIZATION BY MOUTH EVERY 6 HOURS AS NEEDED FOR WHEEZING OR FOR SHORTNESS OF BREATH 360 mL 0    escitalopram (LEXAPRO) 10 MG tablet       insulin regular human (HUMULIN R) 500 UNIT/ML concentrated injection vial Patient using 0.60ml with breakfast, 0.55ml with lunch and 0.25 ml with dinner. 60 mL 3    vitamin D3 (CHOLECALCIFEROL) 25 MCG (1000 UT) TABS tablet Take 1 tablet by mouth daily 90 tablet 1    nystatin (MYCOSTATIN) 698777 UNIT/GM powder Apply 2 times daily in the skin folds for long term.  60 g 3    chlorhexidine (HIBICLENS) 4 % external liquid Use once or twice a day to clean the left armpit, diluted in water and wash the left armpit with it 946 mL 3    venlafaxine (EFFEXOR XR) 150 MG extended release capsule Take 1 capsule by mouth every morning Dose increased 3/2/2022 90 capsule 2    naloxone 4 MG/0.1ML LIQD nasal spray 1 spray by Nasal route as needed for Opioid Reversal Patient needs counseling 1 each 3    gabapentin (NEURONTIN) 300 MG capsule TAKE ONE CAPSULE BY MOUTH THREE TIMES PER DAY 90 capsule 2    butalbital-acetaminophen-caffeine (FIORICET, ESGIC) -40 MG per tablet Take 1 tablet by mouth every 8 hours as needed for Headaches 60 tablet 1    MAGNESIUM-OXIDE 400 (241.3 Mg) MG TABS tablet Take 1 tablet by mouth 2 times daily Frequency increased to 10/20/2022 180 tablet 3    pantoprazole (PROTONIX) 40 MG tablet Take 1 tablet by mouth every morning (before breakfast) 90 tablet 1    nystatin (MYCOSTATIN) 513118 UNIT/ML suspension Take 5 mLs by mouth 4 times daily Swish and swallow 240 mL 0    FEROSUL 325 (65 Fe) MG tablet TAKE ONE TABLET BY MOUTH DAILY 90 tablet 3    SPIRIVA RESPIMAT 2.5 MCG/ACT AERS inhaler INHALE TWO PUFFS BY MOUTH DAILY 4 g 5 ADVAIR -21 MCG/ACT inhaler INHALE TWO PUFFS BY MOUTH TWICE A DAY 36 g 5    metoprolol tartrate (LOPRESSOR) 50 MG tablet Take 1 tablet by mouth 2 times daily 180 tablet 3    MUCUS RELIEF 600 MG extended release tablet       bumetanide (BUMEX) 1 MG tablet Take 1 tablet by mouth 2 times daily Dose decreased 10/23/2021 (Patient taking differently: Take 2 mg by mouth 2 times daily Dose decreased 10/23/2021) 180 tablet 0    ammonium lactate (LAC-HYDRIN) 12 % lotion Apply topically daily. 222 mL 0    ROCKLATAN 0.02-0.005 % SOLN       epoetin leslie-epbx (RETACRIT) 3000 UNIT/ML SOLN injection Inject 1 mL into the skin three times a week 21.9 mL 0    Insulin Syringe-Needle U-100 31G X 5/16\" 1 ML MISC Use to subcutaneously inject insulin three times daily 300 each 2    docusate sodium (COLACE) 100 MG capsule Take 1 capsule by mouth 2 times daily For constipation 180 capsule 3    latanoprost (XALATAN) 0.005 % ophthalmic solution 1 drop nightly      clopidogrel (PLAVIX) 75 MG tablet Take 1 tablet by mouth daily 90 tablet 3    nitroGLYCERIN (NITROSTAT) 0.4 MG SL tablet DISSOLVE 1 TAB UNDER TONGUE FOR CHEST PAIN - IF PAIN REMAINS AFTER 5 MIN, CALL 911 AND REPEAT DOSE. MAX 3 TABS IN 15 MINUTES 25 tablet 2    clobetasol (TEMOVATE) 0.05 % ointment Apply topically 2 times daily for psoriasis 1 Tube 3    ketoconazole (NIZORAL) 2 % cream Apply twice a day for yeast infection in the skin folds, for 4 weeks 1 Tube 3    Lancets MISC Use to check blood sugar three times daily along with when necessary due to symptoms. 300 each 2    vitamin B-12 (CYANOCOBALAMIN) 500 MCG tablet Take 1 tablet by mouth daily 90 tablet 3    Oxygen Tubing MISC by Does not apply route DX COPD. chronic respiratory failure 1 each 0    Respiratory Therapy Supplies (NEBULIZER/TUBING/MOUTHPIECE) KIT Dx COPD needs nebulizer supplies 1 kit 11    ONE TOUCH ULTRASOFT LANCETS MISC Patient to test blood sugar up to 4 times daily.  300 each 3    Lancet Devices (LANCING DEVICE) MISC Provide patient with lancing device appropriate for his machine/lancing needles. 1 each 1    Handicap Placard MISC by Does not apply route Can't walk greater than 200 feet. Expires in 5 years. 1 each 0    fluticasone (CUTIVATE) 0.05 % cream Apply topically 2 times daily       fluticasone (FLONASE) 50 MCG/ACT nasal spray 2 sprays by Nasal route daily (Patient taking differently: 2 sprays by Nasal route daily as needed (sinus symptoms) ) 1 Bottle 3    Melatonin 10 MG TABS Take 10 mg by mouth nightly as needed (insomnia) 90 tablet 1    aspirin 81 MG EC tablet Take 81 mg by mouth daily. No current facility-administered medications on file prior to encounter. REVIEW OF SYSTEMS    Review of Systems   Constitutional: Negative for chills and fever. Cardiovascular: Positive for leg swelling. Both legs chronic   Gastrointestinal: Negative for diarrhea, nausea and vomiting. Skin: Negative for wound. Dry skin         Objective:      BP (!) 150/65   Pulse (!) 108   Temp 98.6 °F (37 °C) (Tympanic)   Resp 24   Ht 6' (1.829 m)   Wt (!) 402 lb (182.3 kg)   BMI 54.52 kg/m²     Wt Readings from Last 3 Encounters:   06/07/22 (!) 402 lb (182.3 kg)   05/05/22 (!) 412 lb (186.9 kg)   04/19/22 (!) 419 lb (190.1 kg)       Physical Exam:  General:  Alert and oriented x3. In no acute distress. Lower Extremity Physical Exam:    Vascular: DP pulses are not palpable, Bilateral. PT pulses are not palpable, Bilateral. CFT <3 seconds to all digits, Bilateral.  Nonpitting edema, Bilateral.  Hair growth is absent to the level of the lower leg, Bilateral.  Lipodermatosclerosis is present to both lower extremities. Neuro: Saph/sural/SP/DP/plantar sensation diminished to light touch. Musculoskeletal: EHL/FHL/GS/TA gross motor intact. Gross deformity is absent. Dermatologic: Ulceration noted to the left anterior leg that is granular. There is no erythema. There is no purulent drainage. both lower extremities    4. Morbid obesity with BMI of 50.0-59.9, adult ([address]    5. Non-healing ulcer of lower leg, left, with fat layer exposed ([address]    6. Venous insufficiency of left lower extremity    7. Stasis dermatitis of both legs          Plan:     Procedure Note  Indications:  Based on my examination of this patient's wound(s)/ulcer(s) today, debridement is required to promote healing and evaluate the wound base. Performed by: Tiffanie Best DPM    Consent obtained:  Yes    Time out taken:  Yes    Pain Control: Anesthetic  Anesthetic: 4% Lidocaine Liquid Topical       Wound Location: left  Pre Debridement Measurements:  Are located in the Wound/Ulcer Documentation Flow Sheet    Wound/Ulcer #: 1    Procedure in Detail: Lidocaine gel soaked gauze was applied at beginning of wound evaluation. The wound(s) was excisionally debrided sharply of fibrotic, necrotic, and hyperkeratotic tissue, including a layer of surrounding healthy tissue using curette. The wound was debrided down through and including subcutaneous. Percent of Wound Debrided: 100%    Total Surface Area Debrided:  3.78 sq cm     Bleeding: Minimal    Post Debridement Measurements:  Wound/Ulcer Descriptions are Pre Debridement except measurements:    Debridement performed to the level of subcutaneous with a curette. Cultures obtained. Started doxycycline  Nails 1-10 trimmed with sterile nail nippers without incident    Treatment Note please see attached Discharge Instructions    Educated on the importance of compression therapy for healing and prevention of re-ulceration in the setting of lymphedema. Advised to continue the compressive wraps  Continue [place] wraps     Educated on signs and symptoms of infection. Instructed to call clinic immediately or go to ER if signs and symptoms of infection are present. RTC 1 week      Written patient discharge instructions given to patient and signed by patient or POA.       Orders Placed This Encounter   Medications    doxycycline hyclate (VIBRAMYCIN) 100 mg capsule     Sig: Take 1 capsule by mouth 2 times daily for 14 days     Dispense:  28 capsule     Refill:  0     Orders Placed This Encounter   Procedures    Culture, Tissue     Standing Status:   Standing     Number of Occurrences:   1        I performed a history and physical examination of the patient and discussed management with the resident. I reviewed the residents note and agree with the documented findings and plan of care. Any areas of disagreement are noted on the chart. I was personally present for the key portions of any procedures. I have documented in the chart those procedures where I was not present during the key portions. I have reviewed the Podiatry Resident progress note. I agree with the chief complaint, past medical history, past surgical history, allergies, medications, social and family history as documented unless otherwise noted below. Documentation of the HPI, Physical Exam and Medical Decision Making performed by medical students or scribes is based on my personal performance of the HPI, PE and MDM. I have personally evaluated this patient and have completed at least one if not all key elements of the E/M (history, physical exam, and MDM). Additional findings are as noted.      Hi Coley DPM on 6/8/2022 at 5:52 PM  Board Certified, American Board of Podiatric Surgery  Fellow, [employer] and [employer]

## 2022-06-12 LAB
CULTURE: NORMAL
DIRECT EXAM: NORMAL
DIRECT EXAM: NORMAL
SPECIMEN DESCRIPTION: NORMAL

## 2022-06-14 ENCOUNTER — HOSPITAL ENCOUNTER (OUTPATIENT)
Dept: WOUND CARE | Age: 57
Discharge: HOME | End: 2022-06-14
Payer: COMMERCIAL

## 2022-06-14 VITALS
HEART RATE: 88 BPM | HEIGHT: 72 IN | TEMPERATURE: 97.8 F | SYSTOLIC BLOOD PRESSURE: 155 MMHG | RESPIRATION RATE: 22 BRPM | DIASTOLIC BLOOD PRESSURE: 74 MMHG | BODY MASS INDEX: 42.66 KG/M2 | WEIGHT: 315 LBS

## 2022-06-14 DIAGNOSIS — E11.42 TYPE 2 DIABETES MELLITUS WITH DIABETIC POLYNEUROPATHY, WITH LONG-TERM CURRENT USE OF INSULIN (HCC): ICD-10-CM

## 2022-06-14 DIAGNOSIS — L97.922 NON-HEALING ULCER OF LOWER LEG, LEFT, WITH FAT LAYER EXPOSED (HCC): Primary | ICD-10-CM

## 2022-06-14 DIAGNOSIS — L03.90 WOUND CELLULITIS: ICD-10-CM

## 2022-06-14 DIAGNOSIS — Z79.4 TYPE 2 DIABETES MELLITUS WITH DIABETIC POLYNEUROPATHY, WITH LONG-TERM CURRENT USE OF INSULIN (HCC): ICD-10-CM

## 2022-06-14 DIAGNOSIS — I73.9 PVD (PERIPHERAL VASCULAR DISEASE) (HCC): ICD-10-CM

## 2022-06-14 DIAGNOSIS — I87.2 VENOUS INSUFFICIENCY OF LEFT LOWER EXTREMITY: ICD-10-CM

## 2022-06-14 DIAGNOSIS — E11.69 DIABETES MELLITUS TYPE 2 IN OBESE (HCC): ICD-10-CM

## 2022-06-14 DIAGNOSIS — E66.9 DIABETES MELLITUS TYPE 2 IN OBESE (HCC): ICD-10-CM

## 2022-06-14 DIAGNOSIS — I87.2 VENOUS INSUFFICIENCY OF BOTH LOWER EXTREMITIES: ICD-10-CM

## 2022-06-14 DIAGNOSIS — E66.01 MORBID OBESITY WITH BMI OF 50.0-59.9, ADULT (HCC): ICD-10-CM

## 2022-06-14 DIAGNOSIS — I87.2 STASIS DERMATITIS OF BOTH LEGS: ICD-10-CM

## 2022-06-14 PROCEDURE — 11042 DBRDMT SUBQ TIS 1ST 20SQCM/<: CPT | Performed by: PODIATRIST

## 2022-06-14 PROCEDURE — 11042 DBRDMT SUBQ TIS 1ST 20SQCM/<: CPT

## 2022-06-14 RX ORDER — BACITRACIN ZINC AND POLYMYXIN B SULFATE 500; 1000 [USP'U]/G; [USP'U]/G
OINTMENT TOPICAL ONCE
Status: CANCELLED | OUTPATIENT
Start: 2022-06-14 | End: 2022-06-14

## 2022-06-14 RX ORDER — CLOBETASOL PROPIONATE 0.5 MG/G
OINTMENT TOPICAL ONCE
Status: CANCELLED | OUTPATIENT
Start: 2022-06-14 | End: 2022-06-14

## 2022-06-14 RX ORDER — LIDOCAINE HYDROCHLORIDE 20 MG/ML
JELLY TOPICAL ONCE
Status: CANCELLED | OUTPATIENT
Start: 2022-06-14 | End: 2022-06-14

## 2022-06-14 RX ORDER — BETAMETHASONE DIPROPIONATE 0.05 %
OINTMENT (GRAM) TOPICAL ONCE
Status: CANCELLED | OUTPATIENT
Start: 2022-06-14 | End: 2022-06-14

## 2022-06-14 RX ORDER — GINSENG 100 MG
CAPSULE ORAL ONCE
Status: CANCELLED | OUTPATIENT
Start: 2022-06-14 | End: 2022-06-14

## 2022-06-14 RX ORDER — GENTAMICIN SULFATE 1 MG/G
OINTMENT TOPICAL ONCE
Status: CANCELLED | OUTPATIENT
Start: 2022-06-14 | End: 2022-06-14

## 2022-06-14 RX ORDER — LIDOCAINE HYDROCHLORIDE 40 MG/ML
SOLUTION TOPICAL ONCE
Status: CANCELLED | OUTPATIENT
Start: 2022-06-14 | End: 2022-06-14

## 2022-06-14 RX ORDER — BACITRACIN, NEOMYCIN, POLYMYXIN B 400; 3.5; 5 [USP'U]/G; MG/G; [USP'U]/G
OINTMENT TOPICAL ONCE
Status: CANCELLED | OUTPATIENT
Start: 2022-06-14 | End: 2022-06-14

## 2022-06-14 RX ORDER — LIDOCAINE HYDROCHLORIDE 40 MG/ML
SOLUTION TOPICAL ONCE
Status: COMPLETED | OUTPATIENT
Start: 2022-06-14 | End: 2022-06-14

## 2022-06-14 RX ORDER — LIDOCAINE 40 MG/G
CREAM TOPICAL ONCE
Status: CANCELLED | OUTPATIENT
Start: 2022-06-14 | End: 2022-06-14

## 2022-06-14 RX ORDER — LIDOCAINE 50 MG/G
OINTMENT TOPICAL ONCE
Status: CANCELLED | OUTPATIENT
Start: 2022-06-14 | End: 2022-06-14

## 2022-06-14 RX ADMIN — LIDOCAINE HYDROCHLORIDE 10 ML: 40 SOLUTION TOPICAL at 10:28

## 2022-06-14 ASSESSMENT — PAIN SCALES - GENERAL: PAINLEVEL_OUTOF10: 0

## 2022-06-14 ASSESSMENT — ENCOUNTER SYMPTOMS
DIARRHEA: 0
VOMITING: 0
ROS SKIN COMMENTS: DRY SKIN
NAUSEA: 0

## 2022-06-14 NOTE — PROGRESS NOTES
[address]   Progress Note and Procedure Note      Miguel Gates. MEDICAL RECORD NUMBER:  [id number]  AGE: 61 y.o. GENDER: male  : 1965  EPISODE DATE:  2022    Subjective:     Chief Complaint   Patient presents with    Wound Check     left leg         HISTORY of PRESENT ILLNESS HPI     Jinny Gonzalez is a 61 y.o. male who presents today for wound/ulcer evaluation. Interval history: Bialey Churchill presents for follow-up of left leg ulcerations. He states the ulcers were healed but recently developed an ulcer to the left anterior leg. No pus or drainage appreciated. He states he does not remember the cause of inury. He states he has been using compressive wraps. Relates that they are healed and has had no drainage. He does complain of dry skin on the legs and feet.         Ulcer Identification:  Ulcer Type: lymphedema  Contributing Factors: edema, lymphedema, diabetes, decreased mobility, obesity and arterial insufficiency          PAST MEDICAL HISTORY        Diagnosis Date    Acute kidney injury superimposed on CKD ([address] 4/10/2013    Acute on chronic congestive heart failure (HCC)     Acute on chronic kidney failure ([address] 2017    Acute on chronic respiratory failure ([address] 10/02/2018    Acute on chronic respiratory failure with hypoxia ([address] 2021    Adhesive capsulitis of left shoulder 2017    Anemia, normocytic normochromic 2021    Anxiety 10/02/2016    smokes marijuana for this    Arthropathy, unspecified, other specified sites 2013    Asthma     B12 deficiency     Bilateral lower leg cellulitis 2016    Blood in stool     CAD (coronary artery disease)     Cardiovascular stress test abnormal 2018    Cellulitis of both lower extremities 2017    Cellulitis of leg, left 2017    CHF (congestive heart failure), NYHA class III (HCC) 2013    Chronic back pain     Chronic bronchitis ([address]     Chronic headaches     was referred to neuro, testing scheduled    Chronic infective otitis externa 4/28/2017    Chronic kidney disease     Chronic malignant otitis externa of both ears 7/24/2020    Chronic respiratory failure (HCC)     was on vent    Chronic ulcer of left leg, with fat layer exposed ([address] 02/22/2019    healed    Class 2 severe obesity due to excess calories with serious comorbidity and body mass index (BMI) of 35.0 to 35.9 in adult ([address]     (BMI 35.0-39.9 without comorbidity)    COPD exacerbation ([address] 11/02/2016    Diabetic neuropathy ([address] 08/14/2013    Displacement of lumbar intervertebral disc without myelopathy 06/13/2013    Ear infection     RIGHT    Elevated troponin     Essential hypertension     Facial cellulitis 2012    Fall 03/25/2017    GERD (gastroesophageal reflux disease)     Head injury     Hearing loss in right ear     pencil pierced ear as a child    Hepatic steatosis 12/03/2015    History of general anesthesia complication     has woke up during surgery under anesthesia    History of rib fracture 12/03/2015    Chronic     Hyperlipidemia     Hypersomnia     can go multiple days without sleeping    Hypertension     Insomnia     Intolerance of continuous positive airway pressure (CPAP) ventilation 07/20/2017    Iron deficiency     Localized rash     gets frequently in axilla, groin, in any fold, on several topical treatments for this    Lymphedema of both lower extremities 4/27/2021    Magnesium deficiency     Mastoiditis of left side     Mixed conductive and sensorineural hearing loss of both ears 01/10/2017    Per ENT    Mixed type COPD (chronic obstructive pulmonary disease) ([address]     On home O2, multiple inhlaers, nebulizer    Moderate recurrent major depression ([address] 10/02/2016    Morbid obesity with BMI of 45.0-49.9, adult ([address] 06/16/2015    NSTEMI (non-ST elevated myocardial infarction) ([address]     On home oxygen therapy     3 Lpm prn    Open wound of groin 12/19/2018    healed     BARBY on CPAP     Osteoarthritis     Otitis externa of left ear     Pancreatitis chronic     Persistent depressive disorder 11/19/2019    Renal insufficiency     proteinuria    Seizures ([address] 6/27/2019    Severe depression ([address] 09/25/2013    Spinal stenosis of lumbar region without neurogenic claudication 01/06/2016    MRI lumbar 12/30/15 L3-L4: There is broad-based bulging disc which appears protruding left laterally causing flattening of the ventral thecal sac. In addition, there is facet arthropathy with mild hypertrophic changes.  There is borderline central canal stenosis with  evidence of moderate left neural foraminal narrowing and mild right neural foramina narrowing.   L4-L5: There is broad-based protrud    Syncope 04/28/2017    Tinnitus of both ears 01/10/2017    Per ENT    Type 2 diabetes mellitus with stage 3 chronic kidney disease, with long-term current use of insulin ([address] 12/26/2016    due to underlying condition with hyperosmolarity without coma    Type II or unspecified type diabetes mellitus without mention of complication, not stated as uncontrolled     uncontrolled    Uncontrolled type 2 diabetes mellitus with hyperglycemia ([address] 7/15/2013    Unstable angina ([address] 9/11/2021    Vitamin D deficiency     Wears glasses     for reading       PAST SURGICAL HISTORY    Past Surgical History:   Procedure Laterality Date    BACK SURGERY   (x 4) 2000,.12/2011.2/2012     Dr Isaac Hernandez last 2 surg    CARDIAC CATHETERIZATION  04/23/2018    PATENT OM STENT    COLONOSCOPY  11/03/2015    hemorrhoids, poor prep, not done    COLONOSCOPY  2013    COLONOSCOPY N/A 04/12/2021    COLONOSCOPY POLYPECTOMY SNARE/COLD BIOPSY/HOT BIOPSY/CLIP APPLICATION X1 performed by Hiram Larson MD at [address]  03/2013    x 1    EYE SURGERY      HAND TENDON SURGERY Left     thumb tendon repair    INTRACAPSULAR CATARACT EXTRACTION Right 2019    EYE CATARACT EMULSIFICATION IOL IMPLANT performed by Lashon Krishnan MD at [address] Left 2020    EYE CATARACT EMULSIFICATION IOL IMPLANT performed by Lashon Krishnan MD at [address] ARTHROSCOPY Left     NERVE BLOCK  2015    TENS unit    FL ESOPHAGOGASTRODUODENOSCOPY TRANSORAL DIAGNOSTIC N/A 2018    EGD ESOPHAGOGASTRODUODENOSCOPY performed by Meri Draper MD at [address] TYMPANOMASTOIDECTOMY Bilateral 2012    Dr Gupta Crew History   Problem Relation Age of Onset    Heart Disease Mother          age 64 from MI   Ary Marcelino High Blood Pressure Mother     Diabetes Mother     High Blood Pressure Father          age 80 from CKD and Lung Fibrosis    Kidney Disease Father     Heart Disease Sister     Heart Attack Sister     Obesity Sister     Diabetes Sister     Asthma Sister     Kidney Disease Sister         Passed        SOCIAL HISTORY    Social History     Tobacco Use    Smoking status: Former Smoker     Packs/day: 0.25     Years: 33.00     Pack years: 8.25     Types: Cigarettes     Start date: 1985     Quit date: 2020     Years since quittin.6    Smokeless tobacco: Former User     Types: Snuff     Quit date: 1995   Vaping Use    Vaping Use: Never used   Substance Use Topics    Alcohol use: No     Alcohol/week: 0.0 standard drinks    Drug use: Yes     Types: Marijuana (Weed)     Comment: Patient reports he quit using THC for 26 days on 2021, using THC gummies as of 22       ALLERGIES    Allergies   Allergen Reactions    Levofloxacin Anaphylaxis     Patient reports needing epinephrine \"about 5 or 6 months ago\" for anaphylaxis (itching, hives, SOB/swelling) after receiving Levofloxacin.   Previous report from 2012: Nausea/Vomiting    Lorazepam      Falls      Nsaids      CHF&CKD    Prozac [Fluoxetine Hcl] Other (See Comments)     Pt started with seizures after started taking.  Vancomycin Other (See Comments)     Itching, SOB, emesis upon infusion in ED 6/12/2019. Patient states he has had vancomycin \"a number of times\" before without issue. couldn't breath and talk, throat closed       MEDICATIONS    Current Outpatient Medications on File Prior to Encounter   Medication Sig Dispense Refill    doxycycline hyclate (VIBRAMYCIN) 100 mg capsule Take 1 capsule by mouth 2 times daily for 14 days 28 capsule 0    tiZANidine (ZANAFLEX) 4 MG tablet TAKE ONE TABLET BY MOUTH EVERY 8 HOURS AS NEEDED FOR MUSCLE SPASMS 90 tablet 3    allopurinol (ZYLOPRIM) 300 MG tablet Take 1 tablet by mouth daily Dose increased 12/9/2021 . Stop if any rash develops 90 tablet 1    albuterol sulfate  (90 Base) MCG/ACT inhaler INHALE TWO PUFFS BY MOUTH EVERY 6 HOURS AS NEEDED FOR WHEEZING OR FOR SHORTNESS OF BREATH 8.5 g 3    oxyCODONE HCl (OXY-IR) 10 MG immediate release tablet Take 1 tablet by mouth every 8 hours as needed for Pain for up to 30 days. 90 tablet 0    CHANTIX STARTING MONTH GUALBERTO 0.5 MG X 11 & 1 MG X 42 tablet       lisinopril (PRINIVIL;ZESTRIL) 2.5 MG tablet Take 1 tablet by mouth daily Dose decreased 3/2/2022 90 tablet 3    amitriptyline (ELAVIL) 50 MG tablet TAKE ONE TABLET BY MOUTH EVERY NIGHT AT BEDTIME 90 tablet 0    Continuous Blood Gluc Sensor (FREESTYLE CRISTINE 14 DAY SENSOR) [facility] USE AS DIRECTED AND CHANGE EVERY 14 DAYS 2 each 5    blood glucose monitor strips Test 3 times a day & as needed for symptoms of irregular blood glucose. Dispense sufficient amount for indicated testing frequency plus additional to accommodate PRN testing needs. One touch Ultra blue. To be used if Chew not working or to double check sugars.  300 strip 3    rosuvastatin (CRESTOR) 10 MG tablet Take 1 tablet by mouth nightly Stop pravastatin 90 tablet 3    albuterol (PROVENTIL) (2.5 MG/3ML) 0.083% nebulizer solution USE THREE MILLILITERS VIA NEBULIZATION BY MOUTH EVERY 6 HOURS AS NEEDED FOR WHEEZING OR FOR SHORTNESS OF BREATH 360 mL 0    insulin regular human (HUMULIN R) 500 UNIT/ML concentrated injection vial Patient using 0.60ml with breakfast, 0.55ml with lunch and 0.25 ml with dinner. 60 mL 3    vitamin D3 (CHOLECALCIFEROL) 25 MCG (1000 UT) TABS tablet Take 1 tablet by mouth daily 90 tablet 1    nystatin (MYCOSTATIN) 211236 UNIT/GM powder Apply 2 times daily in the skin folds for long term.  60 g 3    chlorhexidine (HIBICLENS) 4 % external liquid Use once or twice a day to clean the left armpit, diluted in water and wash the left armpit with it 946 mL 3    venlafaxine (EFFEXOR XR) 150 MG extended release capsule Take 1 capsule by mouth every morning Dose increased 3/2/2022 90 capsule 2    naloxone 4 MG/0.1ML LIQD nasal spray 1 spray by Nasal route as needed for Opioid Reversal Patient needs counseling 1 each 3    gabapentin (NEURONTIN) 300 MG capsule TAKE ONE CAPSULE BY MOUTH THREE TIMES PER DAY 90 capsule 2    butalbital-acetaminophen-caffeine (FIORICET, ESGIC) -40 MG per tablet Take 1 tablet by mouth every 8 hours as needed for Headaches 60 tablet 1    MAGNESIUM-OXIDE 400 (241.3 Mg) MG TABS tablet Take 1 tablet by mouth 2 times daily Frequency increased to 10/20/2022 180 tablet 3    pantoprazole (PROTONIX) 40 MG tablet Take 1 tablet by mouth every morning (before breakfast) 90 tablet 1    nystatin (MYCOSTATIN) 566370 UNIT/ML suspension Take 5 mLs by mouth 4 times daily Swish and swallow 240 mL 0    FEROSUL 325 (65 Fe) MG tablet TAKE ONE TABLET BY MOUTH DAILY 90 tablet 3    SPIRIVA RESPIMAT 2.5 MCG/ACT AERS inhaler INHALE TWO PUFFS BY MOUTH DAILY 4 g 5    ADVAIR -21 MCG/ACT inhaler INHALE TWO PUFFS BY MOUTH TWICE A DAY 36 g 5    metoprolol tartrate (LOPRESSOR) 50 MG tablet Take 1 tablet by mouth 2 times daily 180 tablet 3    MUCUS RELIEF 600 MG extended release tablet       bumetanide (BUMEX) 1 MG tablet Take 1 tablet by mouth 2 times daily Dose decreased 10/23/2021 (Patient taking differently: Take 2 mg by mouth 2 times daily Dose decreased 10/23/2021) 180 tablet 0    ammonium lactate (LAC-HYDRIN) 12 % lotion Apply topically daily. 222 mL 0    ROCKLATAN 0.02-0.005 % SOLN       epoetin leslie-epbx (RETACRIT) 3000 UNIT/ML SOLN injection Inject 1 mL into the skin three times a week 21.9 mL 0    Insulin Syringe-Needle U-100 31G X 5/16\" 1 ML MISC Use to subcutaneously inject insulin three times daily 300 each 2    docusate sodium (COLACE) 100 MG capsule Take 1 capsule by mouth 2 times daily For constipation 180 capsule 3    latanoprost (XALATAN) 0.005 % ophthalmic solution 1 drop nightly      clopidogrel (PLAVIX) 75 MG tablet Take 1 tablet by mouth daily 90 tablet 3    nitroGLYCERIN (NITROSTAT) 0.4 MG SL tablet DISSOLVE 1 TAB UNDER TONGUE FOR CHEST PAIN - IF PAIN REMAINS AFTER 5 MIN, CALL 911 AND REPEAT DOSE. MAX 3 TABS IN 15 MINUTES 25 tablet 2    clobetasol (TEMOVATE) 0.05 % ointment Apply topically 2 times daily for psoriasis 1 Tube 3    ketoconazole (NIZORAL) 2 % cream Apply twice a day for yeast infection in the skin folds, for 4 weeks 1 Tube 3    Lancets MISC Use to check blood sugar three times daily along with when necessary due to symptoms. 300 each 2    vitamin B-12 (CYANOCOBALAMIN) 500 MCG tablet Take 1 tablet by mouth daily 90 tablet 3    Oxygen Tubing MISC by Does not apply route DX COPD. chronic respiratory failure 1 each 0    Respiratory Therapy Supplies (NEBULIZER/TUBING/MOUTHPIECE) KIT Dx COPD needs nebulizer supplies 1 kit 11    ONE TOUCH ULTRASOFT LANCETS MISC Patient to test blood sugar up to 4 times daily. 300 each 3    Lancet Devices (LANCING DEVICE) MISC Provide patient with lancing device appropriate for his machine/lancing needles. 1 each 1    Handicap Placard [facility] by Does not apply route Can't walk greater than 200 feet. Expires in 5 years.  1 each 0    fluticasone (CUTIVATE) 0.05 % cream Apply topically 2 times daily       fluticasone (FLONASE) 50 MCG/ACT nasal spray 2 sprays by Nasal route daily (Patient taking differently: 2 sprays by Nasal route daily as needed (sinus symptoms) ) 1 Bottle 3    Melatonin 10 MG TABS Take 10 mg by mouth nightly as needed (insomnia) 90 tablet 1    aspirin 81 MG EC tablet Take 81 mg by mouth daily. No current facility-administered medications on file prior to encounter. REVIEW OF SYSTEMS    Review of Systems   Constitutional: Negative for chills and fever. Cardiovascular: Positive for leg swelling. Both legs chronic   Gastrointestinal: Negative for diarrhea, nausea and vomiting. Skin: Negative for wound. Dry skin         Objective:      BP (!) 155/74   Pulse 88   Temp 97.8 °F (36.6 °C) (Tympanic)   Resp 22   Ht 6' (1.829 m)   Wt (!) 428 lb (194.1 kg)   BMI 58.05 kg/m²     Wt Readings from Last 3 Encounters:   06/14/22 (!) 428 lb (194.1 kg)   06/07/22 (!) 428 lb (194.1 kg)   06/07/22 (!) 402 lb (182.3 kg)       Physical Exam:  General:  Alert and oriented x3. In no acute distress. Lower Extremity Physical Exam:    Vascular: DP pulses are not palpable, Bilateral. PT pulses are not palpable, Bilateral. CFT <3 seconds to all digits, Bilateral.  Nonpitting edema, Bilateral.  Hair growth is absent to the level of the lower leg, Bilateral.  Lipodermatosclerosis is present to both lower extremities. Neuro: Saph/sural/SP/DP/plantar sensation diminished to light touch. Musculoskeletal: EHL/FHL/GS/TA gross motor intact. Gross deformity is absent. Dermatologic: Ulceration noted to the left anterior leg that is granular. There is no erythema. There is no purulent drainage. There is no fluctuance or crepitus. Interdigital maceration absent, Bilateral.  Skin of bilateral lower extremity is xerotic, flaking and scaling.   Skin temp is warm to cool and equal bilateral.    Wound 06/07/22 Pretibial Left wound #1 (Active)   Wound Image   06/07/22 1031   Dressing Status New drainage noted; Old drainage noted 06/14/22 1012   Wound Cleansed Soap and water 06/14/22 1012   Dressing/Treatment Other (comment) 06/07/22 1110   Wound Length (cm) 2 cm 06/14/22 1012   Wound Width (cm) 3 cm 06/14/22 1012   Wound Depth (cm) 0.1 cm 06/14/22 1012   Wound Surface Area (cm^2) 6 cm^2 06/14/22 1012   Change in Wound Size % (l*w) -58.73 06/14/22 1012   Wound Volume (cm^3) 0.6 cm^3 06/14/22 1012   Wound Healing % -59 06/14/22 1012   Post-Procedure Length (cm) 0.5 cm 06/14/22 1012   Post-Procedure Width (cm) 0.5 cm 06/14/22 1012   Post-Procedure Depth (cm) 0.1 cm 06/14/22 1012   Post-Procedure Surface Area (cm^2) 0.25 cm^2 06/14/22 1012   Post-Procedure Volume (cm^3) 0.025 cm^3 06/14/22 1012   Wound Assessment Pink/red 06/14/22 1012   Drainage Amount Moderate 06/14/22 1012   Drainage Description Serosanguinous; Yellow 06/14/22 1012   Odor None 06/14/22 1012   Terrie-wound Assessment Blanchable erythema; Hemosiderin staining (brown yellow) 06/14/22 1012   Margins Defined edges 06/14/22 1012   Wound Thickness Description not for Pressure Injury Full thickness 06/14/22 1012   Number of days: 7           PVR/PPG 05/10/21  Summary        Bilateral lower extremity ABIs and PVRs (with toe pressures) were    performed for the evaluation of peripheral vascular disease. Study    demonstrates:        Right:    Mildly abnormal PVRs    FRANKLYN suggests mild vascular insufficiency at rest        Left:    Mildly abnormal PVRs    FRANKLYN suggests no vascular insufficiency at rest         Assessment:      1. Non healing ulcer of lower leg, left with fat layer exposed  2. Type 2 diabetes  3. Edema bilateral LE    1. Non-healing ulcer of lower leg, left, with fat layer exposed ([address]    2. Wound cellulitis    3. Diabetes mellitus type 2 in obese ([address]    4. PVD (peripheral vascular disease) ([address]    5.  Type 2 diabetes mellitus with diabetic polyneuropathy, with long-term current use of insulin ([address]    6. Venous insufficiency of left lower extremity    7. Venous insufficiency of both lower extremities    8. Stasis dermatitis of both legs    9. Morbid obesity with BMI of 50.0-59.9, adult [facility])          Plan:     Procedure Note  Indications:  Based on my examination of this patient's wound(s)/ulcer(s) today, debridement is required to promote healing and evaluate the wound base. Performed by: Edith Diallo DPM    Consent obtained:  Yes    Time out taken:  Yes    Pain Control: Anesthetic  Anesthetic: 4% Lidocaine Liquid Topical       Wound Location: left  Pre Debridement Measurements:  Are located in the Wound/Ulcer Documentation Flow Sheet    Wound/Ulcer #: 1    Procedure in Detail: Lidocaine gel soaked gauze was applied at beginning of wound evaluation. The wound(s) was excisionally debrided sharply of fibrotic, necrotic, and hyperkeratotic tissue, including a layer of surrounding healthy tissue using curette. The wound was debrided down through and including subcutaneous. Percent of Wound Debrided: 100%    Total Surface Area Debrided:  0.25 sq cm     Bleeding: Minimal    Post Debridement Measurements:  Wound/Ulcer Descriptions are Pre Debridement except measurements:      Treatment Note please see attached Discharge Instructions    Educated on the importance of compression therapy for healing and prevention of re-ulceration in the setting of lymphedema. Advised to continue the compressive wraps  Continue [place] wraps     Educated on signs and symptoms of infection. Instructed to call clinic immediately or go to ER if signs and symptoms of infection are present. RTC 1 week      Written patient discharge instructions given to patient and signed by patient or POA.       Orders Placed This Encounter   Medications    lidocaine (XYLOCAINE) 4 % external solution     Orders Placed This Encounter   Procedures    Initiate Outpatient Wound Care Protocol     Cleanse wound with saline    If wound contains bioburden or contamination cleanse with wound cleanser or antimicrobial solution     For normal periwound tissue without irritation nor maceration, apply topical skin protectant    For periwound tissue with irritation and/or maceration, apply zinc based product, topical steroid cream/ointment, or equivalent     For wounds with dry firm black eschar and/or without exudate, apply betadine and leave open to air      For wounds with scant/small to no exudate or drainage, apply wound gel, hydrocolloid, polymer, or equivalent and cover with secondary dressing/foam      For wounds with moderate/large exudate or drainage, apply alginate, hydrofiber, polymer, or equivalent and cover with secondary dressing/foam    For wounds with nonviable tissue requiring removal, apply chemical or mechanical debrider and cover with secondary dressing/foam    For wounds with tunneling, dead space, or cavity, fill or pack with strip/gauze/kerlex to fit and cover with secondary dressing/foam    For wounds with adequate granulation or epithelization, apply wound gel, hydrocolloid, polymer, collagen, or transparent film, and cover secondary dry dressing/foam    For wounds that need additional secondary dressing to help pad or control additional drainage/exudates, add foam, absorbent pad or hydrocolloid    For wounds with suspected or known infection, apply antimicrobial mesh and/or antimicrobial alginate/hydrofiber, or antimicrobial solution moistened gauze/kerlex, or equivalent and cover with secondary dressing/foam    Compression Management needed for edema control, apply multilayer compression or tubular garment or equivalent    Offloading Management needed for pressure relief, apply offloading shoe/boot or equivalent     Standing Status:   Standing     Number of Occurrences:   1        I performed a history and physical examination of the patient and discussed management with the resident.  I reviewed the residents note and agree with the documented findings and plan of care. Any areas of disagreement are noted on the chart. I was personally present for the key portions of any procedures. I have documented in the chart those procedures where I was not present during the key portions. I have reviewed the Podiatry Resident progress note. I agree with the chief complaint, past medical history, past surgical history, allergies, medications, social and family history as documented unless otherwise noted below. Documentation of the HPI, Physical Exam and Medical Decision Making performed by medical students or scribes is based on my personal performance of the HPI, PE and MDM. I have personally evaluated this patient and have completed at least one if not all key elements of the E/M (history, physical exam, and MDM). Additional findings are as noted.      Magui Worthy DPM on 6/14/2022 at 35:02 AM  Board Certified, American Board of Podiatric Surgery  Fellow, [employer]

## 2022-06-14 NOTE — PLAN OF CARE
Problem: Chronic Conditions and Co-morbidities  Goal: Patient's chronic conditions and co-morbidity symptoms are monitored and maintained or improved  Outcome: Progressing     Problem: Discharge Planning  Goal: Discharge to home or other facility with appropriate resources  Outcome: Progressing     Problem: Wound:  Goal: Will show signs of wound healing; wound closure and no evidence of infection  Description: Will show signs of wound healing; wound closure and no evidence of infection  Outcome: Progressing     Problem: Falls - Risk of:  Goal: Will remain free from falls  Description: Will remain free from falls  Outcome: Progressing     Problem: Safety - Adult  Goal: Free from fall injury  Outcome: Progressing     Problem: ABCDS Injury Assessment  Goal: Absence of physical injury  Outcome: Progressing

## 2022-06-14 NOTE — PROGRESS NOTES
[employer] Application   Below Knee    NAME:  Jinny Lopez. YOB: 1965  MEDICAL RECORD NUMBER:  [id number]  DATE:  6/14/2022     [x] Removed old Daune Melania boot if indicated and wash leg with mild soap and water.  [x] Applied moisturizing agent to dry skin as needed.  [x] Appied primary and secondary dressing as ordered     [x] Applied Unna roll from toes to knee overlapping each time.  [x] Applied ace wrap or coban from toes to below the knee.  [x] Secured with tape and/or metal clips covered with tape.  [x] Instructed patient/caregiver to keep dressing dry and intact. DO NOT REMOVE DRESSING.  [x] Instructed pt/family/caregiver to report excessive draining, loose bandage, wet dressing, severe pain or tingling in toes.  [x] Applied [employer] dressing below the knee to Left lower leg(s)        Unna Boot(s) were applied per  Guidelines.      Electronically signed by Reinaldo Whalen RN on 6/14/2022 at 11:09 AM

## 2022-06-16 ENCOUNTER — PATIENT MESSAGE (OUTPATIENT)
Dept: FAMILY MEDICINE CLINIC | Age: 57
End: 2022-06-16

## 2022-06-16 NOTE — TELEPHONE ENCOUNTER
From: Linwood Casas.   To: Dr. Leon Cons: 6/16/2022 11:38 AM EDT  Subject: Meds     Do you plan on refilling my ESCITALOPRAM. 10 mg Tablet TAKE One A Day Let me know please thank you

## 2022-06-21 ENCOUNTER — HOSPITAL ENCOUNTER (OUTPATIENT)
Dept: WOUND CARE | Age: 57
Discharge: HOME | End: 2022-06-21
Payer: COMMERCIAL

## 2022-06-21 VITALS
WEIGHT: 315 LBS | RESPIRATION RATE: 22 BRPM | DIASTOLIC BLOOD PRESSURE: 52 MMHG | HEIGHT: 72 IN | HEART RATE: 87 BPM | BODY MASS INDEX: 42.66 KG/M2 | SYSTOLIC BLOOD PRESSURE: 121 MMHG | TEMPERATURE: 97.6 F

## 2022-06-21 DIAGNOSIS — I87.2 VENOUS INSUFFICIENCY OF BOTH LOWER EXTREMITIES: ICD-10-CM

## 2022-06-21 DIAGNOSIS — E66.01 MORBID OBESITY WITH BMI OF 50.0-59.9, ADULT (HCC): ICD-10-CM

## 2022-06-21 DIAGNOSIS — I89.0 LYMPHEDEMA OF BOTH LOWER EXTREMITIES: ICD-10-CM

## 2022-06-21 DIAGNOSIS — I87.2 STASIS DERMATITIS OF BOTH LEGS: ICD-10-CM

## 2022-06-21 DIAGNOSIS — I87.2 VENOUS INSUFFICIENCY OF LEFT LOWER EXTREMITY: ICD-10-CM

## 2022-06-21 DIAGNOSIS — L97.922 NON-HEALING ULCER OF LOWER LEG, LEFT, WITH FAT LAYER EXPOSED (HCC): Primary | ICD-10-CM

## 2022-06-21 PROBLEM — L03.90 WOUND CELLULITIS: Status: RESOLVED | Noted: 2019-06-12 | Resolved: 2022-06-21

## 2022-06-21 PROCEDURE — 99213 OFFICE O/P EST LOW 20 MIN: CPT | Performed by: NURSE PRACTITIONER

## 2022-06-21 PROCEDURE — 99212 OFFICE O/P EST SF 10 MIN: CPT

## 2022-06-21 RX ORDER — GENTAMICIN SULFATE 1 MG/G
OINTMENT TOPICAL ONCE
Status: CANCELLED | OUTPATIENT
Start: 2022-06-21 | End: 2022-06-21

## 2022-06-21 RX ORDER — CLOBETASOL PROPIONATE 0.5 MG/G
OINTMENT TOPICAL ONCE
Status: CANCELLED | OUTPATIENT
Start: 2022-06-21 | End: 2022-06-21

## 2022-06-21 RX ORDER — BETAMETHASONE DIPROPIONATE 0.05 %
OINTMENT (GRAM) TOPICAL ONCE
Status: CANCELLED | OUTPATIENT
Start: 2022-06-21 | End: 2022-06-21

## 2022-06-21 RX ORDER — GINSENG 100 MG
CAPSULE ORAL ONCE
Status: CANCELLED | OUTPATIENT
Start: 2022-06-21 | End: 2022-06-21

## 2022-06-21 RX ORDER — LIDOCAINE HYDROCHLORIDE 20 MG/ML
JELLY TOPICAL ONCE
Status: CANCELLED | OUTPATIENT
Start: 2022-06-21 | End: 2022-06-21

## 2022-06-21 RX ORDER — BACITRACIN, NEOMYCIN, POLYMYXIN B 400; 3.5; 5 [USP'U]/G; MG/G; [USP'U]/G
OINTMENT TOPICAL ONCE
Status: CANCELLED | OUTPATIENT
Start: 2022-06-21 | End: 2022-06-21

## 2022-06-21 RX ORDER — LIDOCAINE 40 MG/G
CREAM TOPICAL ONCE
Status: CANCELLED | OUTPATIENT
Start: 2022-06-21 | End: 2022-06-21

## 2022-06-21 RX ORDER — LIDOCAINE 50 MG/G
OINTMENT TOPICAL ONCE
Status: CANCELLED | OUTPATIENT
Start: 2022-06-21 | End: 2022-06-21

## 2022-06-21 RX ORDER — LIDOCAINE HYDROCHLORIDE 40 MG/ML
SOLUTION TOPICAL ONCE
Status: DISCONTINUED | OUTPATIENT
Start: 2022-06-21 | End: 2022-06-21

## 2022-06-21 RX ORDER — LIDOCAINE HYDROCHLORIDE 40 MG/ML
SOLUTION TOPICAL ONCE
Status: CANCELLED | OUTPATIENT
Start: 2022-06-21 | End: 2022-06-21

## 2022-06-21 RX ORDER — BACITRACIN ZINC AND POLYMYXIN B SULFATE 500; 1000 [USP'U]/G; [USP'U]/G
OINTMENT TOPICAL ONCE
Status: CANCELLED | OUTPATIENT
Start: 2022-06-21 | End: 2022-06-21

## 2022-06-21 ASSESSMENT — PAIN SCALES - GENERAL: PAINLEVEL_OUTOF10: 0

## 2022-06-21 NOTE — PROGRESS NOTES
[address]   Progress Note and Procedure Note      Miguel Price. MEDICAL RECORD NUMBER:  [id number]  AGE: 61 y.o. GENDER: male  : 1965  EPISODE DATE:  2022    Subjective:     Chief Complaint   Patient presents with    Wound Check     LLE         HISTORY of PRESENT ILLNESS HPI     Ana Maria Garcia is a 61 y.o. male who presents today for wound/ulcer evaluation. History of Wound Context: here to follow up on left lower leg ulceration that is healed today. Has farroh wraps that he brought with him today. They are 1years old. Will refer to lymphedema clinic now that wounds are healed.    Wound/Ulcer Pain Timing/Severity: none  Quality of pain: N/A  Severity:  0 / 10   Modifying Factors: None  Associated Signs/Symptoms: none    Ulcer Identification:  Ulcer Type: lymphedema  Contributing Factors: edema, lymphedema, diabetes, decreased mobility, obesity and arterial insufficiency    Wound: N/A        PAST MEDICAL HISTORY        Diagnosis Date    Acute kidney injury superimposed on CKD ([address] 4/10/2013    Acute on chronic congestive heart failure (HCC)     Acute on chronic kidney failure [facility] 2017    Acute on chronic respiratory failure ([address] 10/02/2018    Acute on chronic respiratory failure with hypoxia (HCC) 2021    Adhesive capsulitis of left shoulder 2017    Anemia, normocytic normochromic 2021    Anxiety 10/02/2016    smokes marijuana for this    Arthropathy, unspecified, other specified sites 2013    Asthma     B12 deficiency     Bilateral lower leg cellulitis 2016    Blood in stool     CAD (coronary artery disease)     Cardiovascular stress test abnormal     Cellulitis of both lower extremities 2017    Cellulitis of leg, left 2017    CHF (congestive heart failure), NYHA class III (HCC) 2013    Chronic back pain     [facility]     Chronic headaches     was referred to neuro, testing scheduled    Chronic infective otitis externa 4/28/2017    Chronic kidney disease     Chronic malignant otitis externa of both ears 7/24/2020    Chronic respiratory failure (HCC)     was on vent    Chronic ulcer of left leg, with fat layer exposed ([address] 02/22/2019    healed    Class 2 severe obesity due to excess calories with serious comorbidity and body mass index (BMI) of 35.0 to 35.9 in adult ([address]     (BMI 35.0-39.9 without comorbidity)    COPD exacerbation ([address] 11/02/2016    Diabetic neuropathy ([address] 08/14/2013    Displacement of lumbar intervertebral disc without myelopathy 06/13/2013    Ear infection     RIGHT    Elevated troponin     Essential hypertension     Facial cellulitis 2012    Fall 03/25/2017    GERD (gastroesophageal reflux disease)     Head injury     Hearing loss in right ear     pencil pierced ear as a child    Hepatic steatosis 12/03/2015    History of general anesthesia complication     has woke up during surgery under anesthesia    History of rib fracture 12/03/2015    Chronic     Hyperlipidemia     Hypersomnia     can go multiple days without sleeping    Hypertension     Insomnia     Intolerance of continuous positive airway pressure (CPAP) ventilation 07/20/2017    Iron deficiency     Localized rash     gets frequently in axilla, groin, in any fold, on several topical treatments for this    Lymphedema of both lower extremities 4/27/2021    Magnesium deficiency     Mastoiditis of left side     Mixed conductive and sensorineural hearing loss of both ears 01/10/2017    Per ENT    Mixed type COPD (chronic obstructive pulmonary disease) ([address]     On home O2, multiple inhlaers, nebulizer    Moderate recurrent major depression ([address] 10/02/2016    Morbid obesity with BMI of 45.0-49.9, adult ([address] 06/16/2015    NSTEMI (non-ST elevated myocardial infarction) ([address]     On home oxygen therapy     3 Lpm prn    Open wound of groin 12/19/2018    healed  BARBY on CPAP     Osteoarthritis     Otitis externa of left ear     Pancreatitis chronic     Persistent depressive disorder 11/19/2019    Renal insufficiency     proteinuria    Seizures ([address] 6/27/2019    Severe depression ([address] 09/25/2013    Spinal stenosis of lumbar region without neurogenic claudication 01/06/2016    MRI lumbar 12/30/15 L3-L4: There is broad-based bulging disc which appears protruding left laterally causing flattening of the ventral thecal sac. In addition, there is facet arthropathy with mild hypertrophic changes.  There is borderline central canal stenosis with  evidence of moderate left neural foraminal narrowing and mild right neural foramina narrowing.   L4-L5: There is broad-based protrud    Syncope 04/28/2017    Tinnitus of both ears 01/10/2017    Per ENT    Type 2 diabetes mellitus with stage 3 chronic kidney disease, with long-term current use of insulin ([address] 12/26/2016    due to underlying condition with hyperosmolarity without coma    Type II or unspecified type diabetes mellitus without mention of complication, not stated as uncontrolled     uncontrolled    Uncontrolled type 2 diabetes mellitus with hyperglycemia ([address] 7/15/2013    Unstable angina ([address] 9/11/2021    Vitamin D deficiency     Wears glasses     for reading       PAST SURGICAL HISTORY    Past Surgical History:   Procedure Laterality Date    BACK SURGERY   (x 4) 2000,.12/2011.2/2012     Dr Janice Freedman last 2 surg    CARDIAC CATHETERIZATION  04/23/2018    PATENT OM STENT    COLONOSCOPY  11/03/2015    hemorrhoids, poor prep, not done    COLONOSCOPY  2013    COLONOSCOPY N/A 04/12/2021    COLONOSCOPY POLYPECTOMY SNARE/COLD BIOPSY/HOT BIOPSY/CLIP APPLICATION X1 performed by Cody Arriaga MD at [address]  03/2013    x 1    EYE SURGERY      HAND TENDON SURGERY Left     thumb tendon repair    INTRACAPSULAR CATARACT EXTRACTION Right 11/05/2019    EYE CATARACT EMULSIFICATION IOL IMPLANT performed by Josué Sanon MD at [address] Left 2020    EYE CATARACT EMULSIFICATION IOL IMPLANT performed by Josué Sanon MD at [address] [facility] ARTHROSCOPY Left     NERVE BLOCK  2015    TENS unit    NH ESOPHAGOGASTRODUODENOSCOPY TRANSORAL DIAGNOSTIC N/A 2018    EGD ESOPHAGOGASTRODUODENOSCOPY performed by Hiram Larson MD at [address] TYMPANOMASTOIDECTOMY Bilateral 2012    Dr Mikhail Mosqueda History   Problem Relation Age of Onset    Heart Disease Mother          age 64 from [facility] High Blood Pressure Mother     Diabetes Mother     High Blood Pressure Father          age 80 from CKD and Lung Fibrosis    Kidney Disease Father     Heart Disease Sister     Heart Attack Sister     Obesity Sister     Diabetes Sister     Asthma Sister     Kidney Disease Sister         Passed        SOCIAL HISTORY    Social History     Tobacco Use    Smoking status: Former Smoker     Packs/day: 0.25     Years: 33.00     Pack years: 8.25     Types: Cigarettes     Start date: 1985     Quit date: 2020     Years since quittin.6    Smokeless tobacco: Former User     Types: Snuff     Quit date: 1995   Vaping Use    Vaping Use: Never used   Substance Use Topics    Alcohol use: No     Alcohol/week: 0.0 standard drinks    Drug use: Yes     Types: Marijuana (Weed)     Comment: Patient reports he quit using THC for 26 days on 2021, using THC gummies as of 22       ALLERGIES    Allergies   Allergen Reactions    Levofloxacin Anaphylaxis     Patient reports needing epinephrine \"about 5 or 6 months ago\" for anaphylaxis (itching, hives, SOB/swelling) after receiving Levofloxacin.   Previous report from 2012: Nausea/Vomiting    Lorazepam      Falls      Nsaids      CHF&CKD    Prozac [Fluoxetine Hcl] Other (See Comments)     Pt started with seizures after started taking.  Vancomycin Other (See Comments)     Itching, SOB, emesis upon infusion in ED 6/12/2019. Patient states he has had vancomycin \"a number of times\" before without issue. couldn't breath and talk, throat closed       MEDICATIONS    Current Outpatient Medications on File Prior to Encounter   Medication Sig Dispense Refill    doxycycline hyclate (VIBRAMYCIN) 100 mg capsule Take 1 capsule by mouth 2 times daily for 14 days 28 capsule 0    tiZANidine (ZANAFLEX) 4 MG tablet TAKE ONE TABLET BY MOUTH EVERY 8 HOURS AS NEEDED FOR MUSCLE SPASMS 90 tablet 3    allopurinol (ZYLOPRIM) 300 MG tablet Take 1 tablet by mouth daily Dose increased 12/9/2021 . Stop if any rash develops 90 tablet 1    albuterol sulfate  (90 Base) MCG/ACT inhaler INHALE TWO PUFFS BY MOUTH EVERY 6 HOURS AS NEEDED FOR WHEEZING OR FOR SHORTNESS OF BREATH 8.5 g 3    oxyCODONE HCl (OXY-IR) 10 MG immediate release tablet Take 1 tablet by mouth every 8 hours as needed for Pain for up to 30 days. 90 tablet 0    CHANTIX STARTING MONTH GUALBERTO 0.5 MG X 11 & 1 MG X 42 tablet       lisinopril (PRINIVIL;ZESTRIL) 2.5 MG tablet Take 1 tablet by mouth daily Dose decreased 3/2/2022 90 tablet 3    amitriptyline (ELAVIL) 50 MG tablet TAKE ONE TABLET BY MOUTH EVERY NIGHT AT BEDTIME 90 tablet 0    Continuous Blood Gluc Sensor (FREESTYLE CRISTINE 14 DAY SENSOR) [facility] USE AS DIRECTED AND CHANGE EVERY 14 DAYS 2 each 5    blood glucose monitor strips Test 3 times a day & as needed for symptoms of irregular blood glucose. Dispense sufficient amount for indicated testing frequency plus additional to accommodate PRN testing needs. One touch Ultra blue. To be used if Chew not working or to double check sugars.  300 strip 3    rosuvastatin (CRESTOR) 10 MG tablet Take 1 tablet by mouth nightly Stop pravastatin 90 tablet 3    albuterol (PROVENTIL) (2.5 MG/3ML) 0.083% nebulizer solution USE THREE MILLILITERS VIA NEBULIZATION BY MOUTH EVERY 6 HOURS AS NEEDED FOR WHEEZING OR FOR SHORTNESS OF BREATH 360 mL 0    insulin regular human (HUMULIN R) 500 UNIT/ML concentrated injection vial Patient using 0.60ml with breakfast, 0.55ml with lunch and 0.25 ml with dinner. 60 mL 3    vitamin D3 (CHOLECALCIFEROL) 25 MCG (1000 UT) TABS tablet Take 1 tablet by mouth daily 90 tablet 1    nystatin (MYCOSTATIN) 210085 UNIT/GM powder Apply 2 times daily in the skin folds for long term.  60 g 3    chlorhexidine (HIBICLENS) 4 % external liquid Use once or twice a day to clean the left armpit, diluted in water and wash the left armpit with it 946 mL 3    venlafaxine (EFFEXOR XR) 150 MG extended release capsule Take 1 capsule by mouth every morning Dose increased 3/2/2022 90 capsule 2    naloxone 4 MG/0.1ML LIQD nasal spray 1 spray by Nasal route as needed for Opioid Reversal Patient needs counseling 1 each 3    gabapentin (NEURONTIN) 300 MG capsule TAKE ONE CAPSULE BY MOUTH THREE TIMES PER DAY 90 capsule 2    butalbital-acetaminophen-caffeine (FIORICET, ESGIC) -40 MG per tablet Take 1 tablet by mouth every 8 hours as needed for Headaches 60 tablet 1    MAGNESIUM-OXIDE 400 (241.3 Mg) MG TABS tablet Take 1 tablet by mouth 2 times daily Frequency increased to 10/20/2022 180 tablet 3    pantoprazole (PROTONIX) 40 MG tablet Take 1 tablet by mouth every morning (before breakfast) 90 tablet 1    nystatin (MYCOSTATIN) 304950 UNIT/ML suspension Take 5 mLs by mouth 4 times daily Swish and swallow 240 mL 0    FEROSUL 325 (65 Fe) MG tablet TAKE ONE TABLET BY MOUTH DAILY 90 tablet 3    SPIRIVA RESPIMAT 2.5 MCG/ACT AERS inhaler INHALE TWO PUFFS BY MOUTH DAILY 4 g 5    ADVAIR -21 MCG/ACT inhaler INHALE TWO PUFFS BY MOUTH TWICE A DAY 36 g 5    metoprolol tartrate (LOPRESSOR) 50 MG tablet Take 1 tablet by mouth 2 times daily 180 tablet 3    MUCUS RELIEF 600 MG extended release tablet       bumetanide (BUMEX) 1 MG tablet Take 1 tablet by mouth 2 times daily Dose decreased 10/23/2021 (Patient taking differently: Take 2 mg by mouth 2 times daily Dose decreased 10/23/2021) 180 tablet 0    ammonium lactate (LAC-HYDRIN) 12 % lotion Apply topically daily. 222 mL 0    ROCKLATAN 0.02-0.005 % SOLN       epoetin leslie-epbx (RETACRIT) 3000 UNIT/ML SOLN injection Inject 1 mL into the skin three times a week 21.9 mL 0    Insulin Syringe-Needle U-100 31G X 5/16\" 1 ML MISC Use to subcutaneously inject insulin three times daily 300 each 2    docusate sodium (COLACE) 100 MG capsule Take 1 capsule by mouth 2 times daily For constipation 180 capsule 3    latanoprost (XALATAN) 0.005 % ophthalmic solution 1 drop nightly      clopidogrel (PLAVIX) 75 MG tablet Take 1 tablet by mouth daily 90 tablet 3    nitroGLYCERIN (NITROSTAT) 0.4 MG SL tablet DISSOLVE 1 TAB UNDER TONGUE FOR CHEST PAIN - IF PAIN REMAINS AFTER 5 MIN, CALL 911 AND REPEAT DOSE. MAX 3 TABS IN 15 MINUTES 25 tablet 2    clobetasol (TEMOVATE) 0.05 % ointment Apply topically 2 times daily for psoriasis 1 Tube 3    ketoconazole (NIZORAL) 2 % cream Apply twice a day for yeast infection in the skin folds, for 4 weeks 1 Tube 3    Lancets MISC Use to check blood sugar three times daily along with when necessary due to symptoms. 300 each 2    vitamin B-12 (CYANOCOBALAMIN) 500 MCG tablet Take 1 tablet by mouth daily 90 tablet 3    Oxygen Tubing MISC by Does not apply route DX COPD. chronic respiratory failure 1 each 0    Respiratory Therapy Supplies (NEBULIZER/TUBING/MOUTHPIECE) KIT Dx COPD needs nebulizer supplies 1 kit 11    ONE TOUCH ULTRASOFT LANCETS MISC Patient to test blood sugar up to 4 times daily. 300 each 3    Lancet Devices (LANCING DEVICE) MISC Provide patient with lancing device appropriate for his machine/lancing needles. 1 each 1    Handicap Placard [facility] by Does not apply route Can't walk greater than 200 feet. Expires in 5 years.  1 each 0    fluticasone (CUTIVATE) 0.05 % cream Apply topically 2 times daily  fluticasone (FLONASE) 50 MCG/ACT nasal spray 2 sprays by Nasal route daily (Patient taking differently: 2 sprays by Nasal route daily as needed (sinus symptoms) ) 1 Bottle 3    Melatonin 10 MG TABS Take 10 mg by mouth nightly as needed (insomnia) 90 tablet 1    aspirin 81 MG EC tablet Take 81 mg by mouth daily. No current facility-administered medications on file prior to encounter.        REVIEW OF SYSTEMS    Constitutional: negative  Eyes: negative  Ears, nose, mouth, throat, and face: negative  Respiratory: negative  Cardiovascular: negative  Gastrointestinal: negative  Genitourinary:negative  Integument/breast: negative  Hematologic/lymphatic: negative  Musculoskeletal:negative  Neurological: negative  Behavioral/Psych: negative  Endocrine: negative  Allergic/Immunologic: negative    Objective:      BP (!) 121/52   Pulse 87   Temp 97.6 °F (36.4 °C) (Tympanic)   Resp 22   Ht 6' (1.829 m)   Wt (!) 428 lb (194.1 kg)   BMI 58.05 kg/m²     Wt Readings from Last 3 Encounters:   06/21/22 (!) 428 lb (194.1 kg)   06/14/22 (!) 428 lb (194.1 kg)   06/07/22 (!) 428 lb (194.1 kg)       PHYSICAL EXAM    General Appearance: alert and oriented to person, place and time, well-developed and obese, in no acute distress  Skin: warm and dry, no rash or erythema  Head: normocephalic and atraumatic  Eyes: pupils equal, round, extraocular eye movements intact, and conjunctivae normal  Pulmonary/Chest: normal air movement, no respiratory distress  Extremities: no cyanosis and no clubbing  Musculoskeletal: no joint swelling, deformity or tenderness  Neurologic: gait, coordination normal and speech normal      Assessment:      Problem List Items Addressed This Visit     Lymphedema of both lower extremities    Relevant Orders    [address]    Morbid obesity with BMI of 50.0-59.9, adult (HCC)    Relevant Medications    lidocaine (XYLOCAINE) 4 % external solution    Other Relevant Orders    Initiate Outpatient Wound Care Protocol    Non-healing ulcer of lower leg, left, with fat layer exposed ([address] - Primary    Relevant Medications    lidocaine (XYLOCAINE) 4 % external solution    Other Relevant Orders    Initiate Outpatient Wound Care Protocol    Stasis dermatitis of both legs    Relevant Medications    lidocaine (XYLOCAINE) 4 % external solution    Other Relevant Orders    Initiate Outpatient Wound Care Protocol    Venous insufficiency of both lower extremities    Relevant Medications    lidocaine (XYLOCAINE) 4 % external solution    Other Relevant Orders    Initiate Outpatient Wound Care Protocol    Venous insufficiency of left lower extremity    Relevant Medications    lidocaine (XYLOCAINE) 4 % external solution    Other Relevant Orders    Initiate Outpatient Wound Care Protocol           Wound 06/07/22 Pretibial Left wound #1 (Active)   Wound Image   06/21/22 1318   Wound Etiology Venous 06/21/22 1318   Dressing Status Dry; Intact 06/21/22 1318   Wound Cleansed Soap and water 06/21/22 1318   Dressing/Treatment Other (comment) 06/07/22 1110   Wound Length (cm) 0 cm 06/21/22 1318   Wound Width (cm) 0 cm 06/21/22 1318   Wound Depth (cm) 0 cm 06/21/22 1318   Wound Surface Area (cm^2) 0 cm^2 06/21/22 1318   Change in Wound Size % (l*w) 100 06/21/22 1318   Wound Volume (cm^3) 0 cm^3 06/21/22 1318   Wound Healing % 100 06/21/22 1318   Post-Procedure Length (cm) 0 cm 06/21/22 1318   Post-Procedure Width (cm) 0 cm 06/21/22 1318   Post-Procedure Depth (cm) 0 cm 06/21/22 1318   Post-Procedure Surface Area (cm^2) 0 cm^2 06/21/22 1318   Post-Procedure Volume (cm^3) 0 cm^3 06/21/22 1318   Wound Assessment Dry 06/21/22 1318   Drainage Amount Moderate 06/14/22 1012   Drainage Description Serosanguinous; Yellow 06/14/22 1012   Odor None 06/14/22 1012   Terrie-wound Assessment Blanchable erythema; Hemosiderin staining (brown yellow) 06/14/22 1012   Margins Defined edges 06/14/22 1012   Wound Thickness Description not for Pressure Injury Full thickness 06/14/22 1012   Number of days: 14          Wound is healed    Plan:     Treatment Note please see Discharge Instructions    Written patient dismissal instructions given to patient and signed by patient or POA.            Electronically signed by NICOLE Pena CNP on 6/21/2022 at 2:06 PM

## 2022-06-21 NOTE — PLAN OF CARE
Problem: Chronic Conditions and Co-morbidities  Goal: Patient's chronic conditions and co-morbidity symptoms are monitored and maintained or improved  Outcome: Progressing     Problem: Discharge Planning  Goal: Discharge to home or other facility with appropriate resources  Outcome: Progressing     Problem: Wound:  Goal: Will show signs of wound healing; wound closure and no evidence of infection  Description: Will show signs of wound healing; wound closure and no evidence of infection  Outcome: Progressing     Problem: Falls - Risk of:  Goal: Will remain free from falls  Description: Will remain free from falls  Outcome: Progressing     Problem: ABCDS Injury Assessment  Goal: Absence of physical injury  Outcome: Progressing  Flowsheets (Taken 6/21/2022 1315 by Magda Bocanegra RN)  Absence of Physical Injury: Implement safety measures based on patient assessment

## 2022-06-29 ENCOUNTER — HOSPITAL ENCOUNTER (EMERGENCY)
Age: 57
Discharge: HOME | End: 2022-06-29
Attending: EMERGENCY MEDICINE
Payer: COMMERCIAL

## 2022-06-29 VITALS
DIASTOLIC BLOOD PRESSURE: 55 MMHG | BODY MASS INDEX: 42.66 KG/M2 | WEIGHT: 315 LBS | RESPIRATION RATE: 18 BRPM | HEIGHT: 72 IN | OXYGEN SATURATION: 95 % | TEMPERATURE: 98.3 F | HEART RATE: 93 BPM | SYSTOLIC BLOOD PRESSURE: 120 MMHG

## 2022-06-29 PROCEDURE — 99281 EMR DPT VST MAYX REQ PHY/QHP: CPT

## 2022-06-29 ASSESSMENT — PAIN SCALES - GENERAL: PAINLEVEL_OUTOF10: 8

## 2022-06-29 ASSESSMENT — PAIN - FUNCTIONAL ASSESSMENT: PAIN_FUNCTIONAL_ASSESSMENT: 0-10

## 2022-06-30 NOTE — ED NOTES
Patient unable to be found in room or unit.  Physician and [address] [place] [address] Street notified      Jaden Burris RN  06/29/22 [phone]

## 2022-06-30 NOTE — ED TRIAGE NOTES
Mode of arrival (squad #, walk in, police, etc) : walkin       Chief complaint(s): knee pain, leg injury        Arrival Note (brief scenario, treatment PTA, etc). : pt to ed via [facility] for pain to RLE. Pt states he had the oxygen tubing wrapped around his RLE causing a \"pop\" when he tried to untangle self. Notable bilat venous insufficiency which pt states is his baseline at this time. C= \"Have you ever felt that you should Cut down on your drinking? \"  No  A= \"Have people Annoyed you by criticizing your drinking? \"  No  G= \"Have you ever felt bad or Guilty about your drinking? \"  No  E= \"Have you ever had a drink as an Eye-opener first thing in the morning to steady your nerves or to help a hangover? \"  No      Deferred []      Reason for deferring: N/A    *If yes to two or more: probable alcohol abuse. *

## 2022-07-01 ENCOUNTER — TELEPHONE (OUTPATIENT)
Dept: FAMILY MEDICINE CLINIC | Age: 57
End: 2022-07-01

## 2022-07-01 NOTE — TELEPHONE ENCOUNTER
Spoke with patient, he called to state he hurt his R leg really bad. He stated he is not able to put any pressure on it and he fell down onto R leg. He went to ER on 06/29/22 and waited over 2 hours and noone ever came into his room to see him so he left. I stated he needed to go back to ER to be evaluated as he stated his leg is really swollen and not able to move it at all.

## 2022-07-03 ENCOUNTER — APPOINTMENT (OUTPATIENT)
Dept: GENERAL RADIOLOGY | Age: 57
End: 2022-07-03
Payer: COMMERCIAL

## 2022-07-03 ENCOUNTER — APPOINTMENT (OUTPATIENT)
Dept: CT IMAGING | Age: 57
End: 2022-07-03
Payer: COMMERCIAL

## 2022-07-03 ENCOUNTER — HOSPITAL ENCOUNTER (EMERGENCY)
Age: 57
Discharge: HOME | End: 2022-07-03
Attending: EMERGENCY MEDICINE
Payer: COMMERCIAL

## 2022-07-03 VITALS
OXYGEN SATURATION: 96 % | HEART RATE: 88 BPM | HEIGHT: 72 IN | TEMPERATURE: 98.5 F | BODY MASS INDEX: 42.66 KG/M2 | SYSTOLIC BLOOD PRESSURE: 138 MMHG | WEIGHT: 315 LBS | RESPIRATION RATE: 17 BRPM | DIASTOLIC BLOOD PRESSURE: 108 MMHG

## 2022-07-03 DIAGNOSIS — M25.561 ACUTE PAIN OF RIGHT KNEE: Primary | ICD-10-CM

## 2022-07-03 LAB
ABO/RH: NORMAL
ANION GAP SERPL CALCULATED.3IONS-SCNC: 11 MMOL/L (ref 9–17)
ANTIBODY SCREEN: NEGATIVE
ARM BAND NUMBER: NORMAL
BLOOD BANK SPECIMEN: ABNORMAL
BUN BLDV-MCNC: 31 MG/DL (ref 6–20)
CARBOXYHEMOGLOBIN: 6.6 % (ref 0–5)
CHLORIDE BLD-SCNC: 99 MMOL/L (ref 98–107)
CO2: 30 MMOL/L (ref 20–31)
CREAT SERPL-MCNC: 1.5 MG/DL (ref 0.7–1.2)
ETHANOL PERCENT: <0.01 %
ETHANOL: <10 MG/DL
EXPIRATION DATE: NORMAL
FIO2: ABNORMAL
GFR AFRICAN AMERICAN: 58 ML/MIN
GFR NON-AFRICAN AMERICAN: 48 ML/MIN
GFR SERPL CREATININE-BSD FRML MDRD: ABNORMAL ML/MIN/{1.73_M2}
GLUCOSE BLD-MCNC: 90 MG/DL (ref 70–99)
HCG QUALITATIVE: ABNORMAL
HCO3 VENOUS: 29.5 MMOL/L (ref 24–30)
HCT VFR BLD CALC: 34.1 % (ref 40.7–50.3)
HEMOGLOBIN: 10.3 G/DL (ref 13–17)
INR BLD: 0.9
MCH RBC QN AUTO: 26.5 PG (ref 25.2–33.5)
MCHC RBC AUTO-ENTMCNC: 30.2 G/DL (ref 28.4–34.8)
MCV RBC AUTO: 87.9 FL (ref 82.6–102.9)
NRBC AUTOMATED: 0 PER 100 WBC
O2 SAT, VEN: 93.4 % (ref 60–85)
PARTIAL THROMBOPLASTIN TIME: 25.4 SEC (ref 20.5–30.5)
PATIENT TEMP: 37
PCO2, VEN: 53.8 (ref 39–55)
PDW BLD-RTO: 17 % (ref 11.8–14.4)
PH VENOUS: 7.36 (ref 7.32–7.42)
PLATELET # BLD: 202 K/UL (ref 138–453)
PMV BLD AUTO: 11.4 FL (ref 8.1–13.5)
PO2, VEN: 66.8 (ref 30–50)
POSITIVE BASE EXCESS, VEN: 3.7 MMOL/L (ref 0–2)
POTASSIUM SERPL-SCNC: 3.7 MMOL/L (ref 3.7–5.3)
PRO-BNP: 160 PG/ML
PROTHROMBIN TIME: 10.1 SEC (ref 9.1–12.3)
RBC # BLD: 3.88 M/UL (ref 4.21–5.77)
SODIUM BLD-SCNC: 140 MMOL/L (ref 135–144)
WBC # BLD: 11.2 K/UL (ref 3.5–11.3)

## 2022-07-03 PROCEDURE — 72170 X-RAY EXAM OF PELVIS: CPT

## 2022-07-03 PROCEDURE — 84703 CHORIONIC GONADOTROPIN ASSAY: CPT

## 2022-07-03 PROCEDURE — 99284 EMERGENCY DEPT VISIT MOD MDM: CPT

## 2022-07-03 PROCEDURE — 80051 ELECTROLYTE PANEL: CPT

## 2022-07-03 PROCEDURE — 73700 CT LOWER EXTREMITY W/O DYE: CPT

## 2022-07-03 PROCEDURE — 6370000000 HC RX 637 (ALT 250 FOR IP): Performed by: STUDENT IN AN ORGANIZED HEALTH CARE EDUCATION/TRAINING PROGRAM

## 2022-07-03 PROCEDURE — 86900 BLOOD TYPING SEROLOGIC ABO: CPT

## 2022-07-03 PROCEDURE — 86850 RBC ANTIBODY SCREEN: CPT

## 2022-07-03 PROCEDURE — 96374 THER/PROPH/DIAG INJ IV PUSH: CPT

## 2022-07-03 PROCEDURE — 6360000002 HC RX W HCPCS: Performed by: STUDENT IN AN ORGANIZED HEALTH CARE EDUCATION/TRAINING PROGRAM

## 2022-07-03 PROCEDURE — 82565 ASSAY OF CREATININE: CPT

## 2022-07-03 PROCEDURE — 85730 THROMBOPLASTIN TIME PARTIAL: CPT

## 2022-07-03 PROCEDURE — 82947 ASSAY GLUCOSE BLOOD QUANT: CPT

## 2022-07-03 PROCEDURE — 82805 BLOOD GASES W/O2 SATURATION: CPT

## 2022-07-03 PROCEDURE — 84520 ASSAY OF UREA NITROGEN: CPT

## 2022-07-03 PROCEDURE — 73552 X-RAY EXAM OF FEMUR 2/>: CPT

## 2022-07-03 PROCEDURE — 85610 PROTHROMBIN TIME: CPT

## 2022-07-03 PROCEDURE — 83880 ASSAY OF NATRIURETIC PEPTIDE: CPT

## 2022-07-03 PROCEDURE — 72192 CT PELVIS W/O DYE: CPT

## 2022-07-03 PROCEDURE — 85027 COMPLETE CBC AUTOMATED: CPT

## 2022-07-03 PROCEDURE — G0480 DRUG TEST DEF 1-7 CLASSES: HCPCS

## 2022-07-03 PROCEDURE — 86901 BLOOD TYPING SEROLOGIC RH(D): CPT

## 2022-07-03 PROCEDURE — 73590 X-RAY EXAM OF LOWER LEG: CPT

## 2022-07-03 RX ORDER — OXYCODONE HYDROCHLORIDE 5 MG/1
5 TABLET ORAL ONCE
Status: COMPLETED | OUTPATIENT
Start: 2022-07-03 | End: 2022-07-03

## 2022-07-03 RX ORDER — MORPHINE SULFATE 4 MG/ML
4 INJECTION, SOLUTION INTRAMUSCULAR; INTRAVENOUS ONCE
Status: COMPLETED | OUTPATIENT
Start: 2022-07-03 | End: 2022-07-03

## 2022-07-03 RX ADMIN — MORPHINE SULFATE 4 MG: 4 INJECTION INTRAVENOUS at 15:16

## 2022-07-03 RX ADMIN — OXYCODONE 5 MG: 5 TABLET ORAL at 12:09

## 2022-07-03 ASSESSMENT — PAIN SCALES - GENERAL
PAINLEVEL_OUTOF10: 8

## 2022-07-03 ASSESSMENT — PAIN DESCRIPTION - DESCRIPTORS: DESCRIPTORS: SHARP;SHOOTING;THROBBING

## 2022-07-03 ASSESSMENT — PAIN - FUNCTIONAL ASSESSMENT: PAIN_FUNCTIONAL_ASSESSMENT: 0-10

## 2022-07-03 ASSESSMENT — PAIN DESCRIPTION - ORIENTATION: ORIENTATION: RIGHT

## 2022-07-03 ASSESSMENT — PAIN DESCRIPTION - LOCATION: LOCATION: KNEE

## 2022-07-03 NOTE — ED TRIAGE NOTES
Pt arrived co Rt knee pain. Pt states that he tripped over his oxygen tubing on Saturday.   Pt states that his leg stayed put and the rest of his body twiste d

## 2022-07-03 NOTE — ED NOTES
Writer at bedside to witness discussion between Dr. Jacek Galan and patient. Dr. Jacek Galan discussing informed discharge vs AMA. Pt does not wish to stay for testing results. Pt agreeble to come back to ER if he is not feeling well or pain worsens. Pt wife also at bedside.       Noam Arita RN  07/03/22 1800

## 2022-07-03 NOTE — ED PROVIDER NOTES
[facility] ED  Emergency Department Encounter  Emergency Medicine Resident     Pt Name: Osman Rosa. MRN: [id number]  [place] 1965  Date of evaluation: 7/3/22  PCP:  Rehan Freire MD    CHIEF COMPLAINT       Chief Complaint   Patient presents with    Fall     tripped over oxygen tubing on saturday    Knee Pain     posterior side       HISTORY OFPRESENT ILLNESS  (Location/Symptom, Timing/Onset, Context/Setting, Quality, Duration, Modifying Factors,Severity.)      Osman Montoya is a 61 y.o. male with past medical history significant for congestive heart failure, COPD home oxygen requirement obesity with reports of right knee and hip pain after a fall yesterday. Patient states that he landed on his knee wrong, stepped down and twisted the knee and heard a pop and has not been able to ambulate on the right lower extremity since then. Patient has been trying his home medications and home pain medications with minimal use. Did not ice and or heat to the area. Daughter is at bedside helping provide history. Patient denies any numbness or tingling outside of his normal numbness due to his neuropathy. Patient is noting worsening pain with movement and worsening pain with weightbearing. Although knee and hip are painful at rest as well. Pain is severe in nature, sharp, achy in nature worse with movement.     PAST MEDICAL / SURGICAL / SOCIAL / FAMILY HISTORY      has a past medical history of Acute kidney injury superimposed on CKD ([address] Acute on chronic congestive heart failure (HCC), Acute on chronic kidney failure (HCC), Acute on chronic respiratory failure (HCC), Acute on chronic respiratory failure with hypoxia (HCC), Adhesive capsulitis of left shoulder, Anemia, normocytic normochromic, Anxiety, Arthropathy, unspecified, other specified sites, Asthma, B12 deficiency, Bilateral lower leg cellulitis, Blood in stool, CAD (coronary artery disease), Cardiovascular stress test abnormal, Cellulitis of both lower extremities, Cellulitis of leg, left, CHF (congestive heart failure), NYHA class III ([facility]), Chronic back pain, Chronic bronchitis ([facility]), Chronic headaches, Chronic infective otitis externa, Chronic kidney disease, Chronic malignant otitis externa of both ears, Chronic respiratory failure ([address] Chronic ulcer of left leg, with fat layer exposed ([address] Class 2 severe obesity due to excess calories with serious comorbidity and body mass index (BMI) of 35.0 to 35.9 in adult [facility]), COPD exacerbation ([address] Diabetic neuropathy ([address] Displacement of lumbar intervertebral disc without myelopathy, Ear infection, Elevated troponin, Essential hypertension, Facial cellulitis, Fall, GERD (gastroesophageal reflux disease), Head injury, Hearing loss in right ear, Hepatic steatosis, History of general anesthesia complication, History of rib fracture, Hyperlipidemia, Hypersomnia, Hypertension, Insomnia, Intolerance of continuous positive airway pressure (CPAP) ventilation, Iron deficiency, Localized rash, Lymphedema of both lower extremities, Magnesium deficiency, Mastoiditis of left side, Mixed conductive and sensorineural hearing loss of both ears, Mixed type COPD (chronic obstructive pulmonary disease) ([facility]), Moderate recurrent major depression ([address] Morbid obesity with BMI of 45.0-49.9, adult ([address] NSTEMI (non-ST elevated myocardial infarction) ([address] On home oxygen therapy, Open wound of groin, BARBY on CPAP, Osteoarthritis, Otitis externa of left ear, Pancreatitis chronic, Persistent depressive disorder, Renal insufficiency, Seizures ([address] Severe depression ([address] Spinal stenosis of lumbar region without neurogenic claudication, Syncope, Tinnitus of both ears, Type 2 diabetes mellitus with stage 3 chronic kidney disease, with long-term current use of insulin ([address] Type II or unspecified type diabetes mellitus without mention of complication, not stated as uncontrolled, Uncontrolled type 2 diabetes mellitus with hyperglycemia ([facility] Unstable angina ([facility] Vitamin D deficiency, and Wears glasses. has a past surgical history that includes tympanomastoidectomy (Bilateral, 2012); Coronary angioplasty with stent (2013); Hand tendon surgery (Left); Knee arthroscopy (Left); Nerve Block (2015); Cardiac catheterization (2018); pr esophagogastroduodenoscopy transoral diagnostic (N/A, 2018); Intracapsular cataract extraction (Right, 2019); Intracapsular cataract extraction (Left, 2020); back surgery ( (x 4) ,.2011.2012); Colonoscopy (2015); Colonoscopy (); Colonoscopy (N/A, 2021); and eye surgery. Social:  reports that he has been smoking cigarettes. He started smoking about 37 years ago. He has a 16.50 pack-year smoking history. He quit smokeless tobacco use about 27 years ago. His smokeless tobacco use included snuff. He reports current drug use. Drug: Marijuana Lucianne Bars). He reports that he does not drink alcohol. Family Hx:   Family History   Problem Relation Age of Onset    Heart Disease Mother          age 64 from MI   Gabriela Smalls High Blood Pressure Mother     Diabetes Mother     High Blood Pressure Father          age 80 from CKD and Lung Fibrosis    Kidney Disease Father     Heart Disease Sister     Heart Attack Sister     Obesity Sister     Diabetes Sister     Asthma Sister     Kidney Disease Sister         Passed         Allergies:  Levofloxacin, Lorazepam, Nsaids, Prozac [fluoxetine hcl], and Vancomycin    Home Medications:  Prior to Admission medications    Medication Sig Start Date End Date Taking? Authorizing Provider   oxyCODONE HCl (OXY-IR) 10 MG immediate release tablet Take 1 tablet by mouth every 8 hours as needed for Pain for up to 30 days.  22  Brayan Hernandez MD   tiZANidine (ZANAFLEX) 4 MG tablet TAKE ONE TABLET BY MOUTH EVERY 8 HOURS AS NEEDED FOR MUSCLE SPASMS 6/6/22   Rehan Freire MD   allopurinol (ZYLOPRIM) 300 MG tablet Take 1 tablet by mouth daily Dose increased 12/9/2021 . Stop if any rash develops 6/6/22   Rehan Freire MD   albuterol sulfate  (90 Base) MCG/ACT inhaler INHALE TWO PUFFS BY MOUTH EVERY 6 HOURS AS NEEDED FOR WHEEZING OR FOR SHORTNESS OF BREATH 6/6/22   Paige Bryant MD   CHANTIX STARTING MONTH GUALBERTO 0.5 MG X 11 & 1 MG X 42 tablet  5/17/22   Historical Provider, MD   lisinopril (PRINIVIL;ZESTRIL) 2.5 MG tablet Take 1 tablet by mouth daily Dose decreased 3/2/2022 5/31/22   Rehan Freire MD   amitriptyline (ELAVIL) 50 MG tablet TAKE ONE TABLET BY MOUTH EVERY NIGHT AT BEDTIME 5/23/22   Mia Briceno MD   Continuous Blood Gluc Sensor (The 19th FloorYLE CRISTINE 14 DAY SENSOR) [facility] USE AS DIRECTED AND CHANGE EVERY 14 DAYS 5/11/22   Rehan Freire MD   blood glucose monitor strips Test 3 times a day & as needed for symptoms of irregular blood glucose. Dispense sufficient amount for indicated testing frequency plus additional to accommodate PRN testing needs. One touch Ultra blue. To be used if Parviz Villasenor not working or to double check sugars. 5/11/22   Rehan Freire MD   rosuvastatin (CRESTOR) 10 MG tablet Take 1 tablet by mouth nightly Stop pravastatin 5/9/22   Rehan Freire MD   albuterol (PROVENTIL) (2.5 MG/3ML) 0.083% nebulizer solution USE THREE MILLILITERS VIA NEBULIZATION BY MOUTH EVERY 6 HOURS AS NEEDED FOR WHEEZING OR FOR SHORTNESS OF BREATH 4/12/22   Rehan Freire MD   insulin regular human (HUMULIN R) 500 UNIT/ML concentrated injection vial Patient using 0.60ml with breakfast, 0.55ml with lunch and 0.25 ml with dinner. 3/7/22   Rehan Freire MD   vitamin D3 (CHOLECALCIFEROL) 25 MCG (1000 UT) TABS tablet Take 1 tablet by mouth daily 3/2/22   Rehan Freire MD   nystatin (MYCOSTATIN) 437810 UNIT/GM powder Apply 2 times daily in the skin folds for long term.  3/2/22   Paige ammonium lactate (LAC-HYDRIN) 12 % lotion Apply topically daily. 10/14/21   Papo Lau MD   ROCKLATAN 0.02-0.005 % SOLN  10/7/21   Historical Provider, MD   epoetin leslie-epbx (RETACRIT) 3000 UNIT/ML SOLN injection Inject 1 mL into the skin three times a week 10/4/21   Jorje Joseph MD   Insulin Syringe-Needle U-100 31G X 5/16\" 1 ML MISC Use to subcutaneously inject insulin three times daily 9/22/21   Papo Lau MD   docusate sodium (COLACE) 100 MG capsule Take 1 capsule by mouth 2 times daily For constipation 8/17/21   Papo Lau MD   latanoprost (XALATAN) 0.005 % ophthalmic solution 1 drop nightly    Historical Provider, MD   clopidogrel (PLAVIX) 75 MG tablet Take 1 tablet by mouth daily 8/11/21   Papo Lau MD   nitroGLYCERIN (NITROSTAT) 0.4 MG SL tablet DISSOLVE 1 TAB UNDER TONGUE FOR CHEST PAIN - IF PAIN REMAINS AFTER 5 MIN, CALL 911 AND REPEAT DOSE. MAX 3 TABS IN 15 MINUTES 8/2/21   Papo Lau MD   clobetasol (TEMOVATE) 0.05 % ointment Apply topically 2 times daily for psoriasis 1/27/21   Papo Lau MD   ketoconazole (NIZORAL) 2 % cream Apply twice a day for yeast infection in the skin folds, for 4 weeks 1/20/21   Papo Lau MD   Lancets MISC Use to check blood sugar three times daily along with when necessary due to symptoms. 9/30/20   Ricardo Thompson MD   vitamin B-12 (CYANOCOBALAMIN) 500 MCG tablet Take 1 tablet by mouth daily 7/13/20   Papo Lau MD   Oxygen Tubing MISC by Does not apply route DX COPD. chronic respiratory failure 3/26/20   Papo Lau MD   Respiratory Therapy Supplies (NEBULIZER/TUBING/MOUTHPIECE) KIT Dx COPD needs nebulizer supplies 2/20/20   Papo Lau MD   ONE TOUCH ULTRASOFT LANCETS [address] Patient to test blood sugar up to 4 times daily. 11/7/19   Ricardo Thompson MD   Lancet Devices (LANCING DEVICE) MISC Provide patient with lancing device appropriate for his machine/lancing needles.  6/4/19 Breath sounds: Normal breath sounds. No wheezing. Abdominal:      General: Abdomen is flat. There is no distension. Palpations: Abdomen is soft. Tenderness: There is no abdominal tenderness. There is no rebound. Musculoskeletal:      Cervical back: Normal range of motion. Right lower leg: Edema present. Left lower leg: Edema present. Comments: Venous stasis dermatitis noted bilaterally. Pain to palpation of right hip. Pelvis is stable, dorsiflexion plantarflexion intact equal bilateral lower extremities, palpable dorsalis pedis and posttibial pulses, pain with movement of right leg. Skin:     General: Skin is warm and dry. Neurological:      Mental Status: He is alert and oriented to person, place, and time. Psychiatric:         Mood and Affect: Mood normal.         Behavior: Behavior normal.         DIFFERENTIAL  DIAGNOSIS       Initial MDM/Plan: 61 y.o. male who presents with right knee and hip pain after a fall yesterday. Upon my initial examination patient is resting comfortably in the cot, in no acute distress, no respiratory distress, speaking full sentences. Vital signs upon arrival are within normal limits including patient being afebrile, nontachycardic, nontachypneic, nontoxic-appearing. Patient has a GCS of 15 is alert, oriented, answering all questions appropriately. Confirmed again that patient did not hit his head, no loss of consciousness, no head trauma. Will obtain x-ray imaging of right knee, femur, hip due to concerns for pain to palpation and inability to ambulate. X-ray imaging are nondiagnostic. CT scans ordered. Pending CT scan reads, patient does not want to stay in the hospital anymore. Patient states that he will follow his MyChart.   Informed discharge provided including risk and benefit of discharge with out finalization of imaging, patient is alert, oriented, answering questions appropriately, patient appears to have decision-making capacity, risks including but not limited to loss of life and limb were discussed with patient. Patient assumes risk. This was witnessed by RNEly. Discharge instructions, strict return precautions divided. Patient discharged per request.        DIAGNOSTIC RESULTS / EMERGENCYDEPARTMENT COURSE / MDM     LABS:  Labs Reviewed   TRAUMA PANEL - Abnormal; Notable for the following components:       Result Value    BUN 31 (*)     RBC 3.88 (*)     Hemoglobin 10.3 (*)     Hematocrit 34.1 (*)     RDW 17.0 (*)     CREATININE 1.50 (*)     GFR Non- 48 (*)     GFR  58 (*)     pO2, Eleno 66.8 (*)     Positive Base Excess, Eleno 3.7 (*)     O2 Sat, Eleno 93.4 (*)     Carboxyhemoglobin 6.6 (*)     All other components within normal limits   BRAIN NATRIURETIC PEPTIDE   TYPE AND SCREEN         RADIOLOGY:  CT PELVIS WO CONTRAST Additional Contrast? None   Final Result   Mild widening of the pubic symphysis with no evidence of an acute pelvic   fracture. Umbilical hernia with rectus sheath diastasis. CT FEMUR RIGHT WO CONTRAST   Final Result   Right femur: No acute fracture identified. Edema within the subcutaneous fat   of the right thigh, greater laterally, which may be from soft tissue   contusion, but is nonspecific and can be seen in the setting of cellulitis or   venous insufficiency. Right knee: No acute fracture. The finding seen on the radiograph was   artifactual secondary to superimposition of degenerative osteophytes with the   articular surface of the femoral condyles. Tricompartmental degenerative disease, greatest medially, moderate to severe   in degree. Small joint effusion. Circumferential subcutaneous edema, which is nonspecific. Again, could be   posttraumatic, but can be seen with cellulitis or venous insufficiency. CT KNEE RIGHT WO CONTRAST   Final Result   Right femur: No acute fracture identified.   Edema within the subcutaneous fat   of the right thigh, greater laterally, which may be from soft tissue   contusion, but is nonspecific and can be seen in the setting of cellulitis or   venous insufficiency. Right knee: No acute fracture. The finding seen on the radiograph was   artifactual secondary to superimposition of degenerative osteophytes with the   articular surface of the femoral condyles. Tricompartmental degenerative disease, greatest medially, moderate to severe   in degree. Small joint effusion. Circumferential subcutaneous edema, which is nonspecific. Again, could be   posttraumatic, but can be seen with cellulitis or venous insufficiency. XR TIBIA FIBULA RIGHT (2 VIEWS)   Final Result   1. Widening of the pubic symphysis up to 1.5 cm diameter. Compatible with   anterior pelvic ring disruption. No direct evidence of fracture. Recommend   CT scan to exclude any possible radiographically occult fracture of the   anterior ring. 2. Subtle irregularity of femoral condyle, probably the lateral femoral   condyle on the cross-table lateral view of the knee taken with the femur   images. This may also be further evaluated with CT to exclude fracture. XR FEMUR RIGHT (MIN 2 VIEWS)   Final Result   1. Widening of the pubic symphysis up to 1.5 cm diameter. Compatible with   anterior pelvic ring disruption. No direct evidence of fracture. Recommend   CT scan to exclude any possible radiographically occult fracture of the   anterior ring. 2. Subtle irregularity of femoral condyle, probably the lateral femoral   condyle on the cross-table lateral view of the knee taken with the femur   images. This may also be further evaluated with CT to exclude fracture. XR PELVIS (1-2 VIEWS)   Final Result   1. Widening of the pubic symphysis up to 1.5 cm diameter. Compatible with   anterior pelvic ring disruption. No direct evidence of fracture.   Recommend   CT scan to exclude any possible radiographically occult fracture of the   anterior ring. 2. Subtle irregularity of femoral condyle, probably the lateral femoral   condyle on the cross-table lateral view of the knee taken with the femur   images. This may also be further evaluated with CT to exclude fracture. PROCEDURES:  None    CONSULTS:  IP CONSULT TO ORTHOPEDIC SURGERY      FINAL IMPRESSION      1.  Acute pain of right knee          DISPOSITION / PLAN     DISPOSITION Decision To Discharge 07/03/2022 05:56:39 PM      PATIENT REFERRED TO:  Brayan Hernandez MD  [address] [facility].  [address] [place] [address]  [phone]    Call   for post emergency department follow up    [facility] ED  [address] [place] [address]  [phone]    As needed, If symptoms worsen      DISCHARGE MEDICATIONS:  Discharge Medication List as of 7/3/2022  5:59 PM          Sagar Patten DO  Emergency Medicine Resident    (Please note that portions of this note were completed with a voice recognition program.Efforts were made to edit the dictations but occasionally words are mis-transcribed.)       Sagar Patten DO  Resident  07/08/22 [id number]

## 2022-07-03 NOTE — ED PROVIDER NOTES
[facility] [address] ED     Emergency Department     Faculty Attestation    I performed a history and physical examination of the patient and discussed management with the resident. I reviewed the residents note and agree with the documented findings and plan of care. Any areas of disagreement are noted on the chart. I was personally present for the key portions of any procedures. I have documented in the chart those procedures where I was not present during the key portions. I have reviewed the emergency nurses triage note. I agree with the chief complaint, past medical history, past surgical history, allergies, medications, social and family history as documented unless otherwise noted below. For Physician Assistant/ Nurse Practitioner cases/documentation I have personally evaluated this patient and have completed at least one if not all key elements of the E/M (history, physical exam, and MDM). Additional findings are as noted. Patient here complaining of right leg pain worse behind the knee since yesterday. Tripped and fell over his oxygen tubing. Been minimally ambulatory although that is baseline for him he tries not to walk due to his severe dyspnea. On exam diffuse tenderness in the lower extremity worse at the knee no visible deformity although limited by habitus distally intact sensation pulses.   Will image      Critical Care     none    Shaquille Dumont MD, Liana Monsalve  Attending Emergency  Physician             Shaquille Dumont MD  07/03/22 [id number]

## 2022-07-03 NOTE — ED NOTES
Dr. John Oreilly at bedside at bedside to update pt on plan of care. Writer was asked by Dr. John Oreilly to place IV and obtain trauma panel. Dr. John Oreilly informed pt and family that he would require ct scan with concerns for pelvic fx and she will place order for morphine for pain control due to aron not helping with pain. IV placed, labs obtained, awaiting orders.       Thierry Hernandez RN  07/03/22 [id number]

## 2022-07-05 ENCOUNTER — PATIENT MESSAGE (OUTPATIENT)
Dept: FAMILY MEDICINE CLINIC | Age: 57
End: 2022-07-05

## 2022-07-05 DIAGNOSIS — M54.42 CHRONIC MIDLINE LOW BACK PAIN WITH LEFT-SIDED SCIATICA: ICD-10-CM

## 2022-07-05 DIAGNOSIS — G89.29 CHRONIC MIDLINE LOW BACK PAIN WITH LEFT-SIDED SCIATICA: ICD-10-CM

## 2022-07-05 DIAGNOSIS — M96.1 POSTLAMINECTOMY SYNDROME OF LUMBAR REGION: ICD-10-CM

## 2022-07-05 DIAGNOSIS — M51.36 DDD (DEGENERATIVE DISC DISEASE), LUMBAR: ICD-10-CM

## 2022-07-05 DIAGNOSIS — M48.061 SPINAL STENOSIS OF LUMBAR REGION WITHOUT NEUROGENIC CLAUDICATION: ICD-10-CM

## 2022-07-05 RX ORDER — OXYCODONE HYDROCHLORIDE 10 MG/1
10 TABLET ORAL EVERY 8 HOURS PRN
Qty: 90 TABLET | Refills: 0 | Status: SHIPPED | OUTPATIENT
Start: 2022-07-05 | End: 2022-08-03 | Stop reason: SDUPTHER

## 2022-07-05 NOTE — TELEPHONE ENCOUNTER
From: Betty Martinez.   To: Dr. Ginny Champagne: 7/5/2022 5:15 AM EDT  Subject: Meds     Can you please refill my pain meds for me OxyCODONE 10 mg take one every 8 hours thank you

## 2022-07-05 NOTE — TELEPHONE ENCOUNTER
Please Approve or Refuse.   Send to Pharmacy per Pt's Request:      Next Visit Date:  10/11/2022   Last Visit Date: 5/31/2022    Hemoglobin A1C (%)   Date Value   06/06/2022 7.8 (H)   02/24/2022 8.8   02/24/2022 8.8             ( goal A1C is < 7)   BP Readings from Last 3 Encounters:   07/03/22 (!) 138/108   06/29/22 (!) 120/55   06/21/22 (!) 121/52          (goal 120/80)  BUN   Date Value Ref Range Status   07/03/2022 31 (H) 6 - 20 mg/dL Final     CREATININE   Date Value Ref Range Status   07/03/2022 1.50 (H) 0.70 - 1.20 mg/dL Final     Potassium   Date Value Ref Range Status   07/03/2022 3.7 3.7 - 5.3 mmol/L Final

## 2022-07-06 ENCOUNTER — TELEPHONE (OUTPATIENT)
Dept: PHARMACY | Age: 57
End: 2022-07-06

## 2022-07-06 NOTE — TELEPHONE ENCOUNTER
Patient's daughter called to cancel appt for diabetes med mgmt today due to patient falling and still having a lot of leg pain. Patient was evaluated and per daughter does not any fractures but a significant sprain. Patient can not bear weight. Daughter states she will call back to reschedule patient. Yesenia Rizvi [facility],Pharm. D,, BCPS, CACP  7/6/2022  5:57 PM

## 2022-07-09 DIAGNOSIS — J44.9 CHRONIC OBSTRUCTIVE PULMONARY DISEASE, UNSPECIFIED COPD TYPE (HCC): ICD-10-CM

## 2022-07-11 ENCOUNTER — HOSPITAL ENCOUNTER (OUTPATIENT)
Dept: OCCUPATIONAL THERAPY | Age: 57
Setting detail: THERAPIES SERIES
Discharge: HOME | End: 2022-07-11

## 2022-07-11 DIAGNOSIS — N18.30 BENIGN HYPERTENSIVE HEART AND CKD, STAGE 3 (GFR 30-59), W CHF (HCC): ICD-10-CM

## 2022-07-11 DIAGNOSIS — J44.9 CHRONIC OBSTRUCTIVE PULMONARY DISEASE, UNSPECIFIED COPD TYPE (HCC): ICD-10-CM

## 2022-07-11 DIAGNOSIS — I13.0 BENIGN HYPERTENSIVE HEART AND CKD, STAGE 3 (GFR 30-59), W CHF (HCC): ICD-10-CM

## 2022-07-11 DIAGNOSIS — I50.32 CHRONIC DIASTOLIC CONGESTIVE HEART FAILURE (HCC): ICD-10-CM

## 2022-07-11 RX ORDER — ALBUTEROL SULFATE 2.5 MG/3ML
SOLUTION RESPIRATORY (INHALATION)
Qty: 360 ML | Refills: 3 | Status: SHIPPED | OUTPATIENT
Start: 2022-07-11 | End: 2022-07-11 | Stop reason: SDUPTHER

## 2022-07-11 RX ORDER — ALBUTEROL SULFATE 2.5 MG/3ML
SOLUTION RESPIRATORY (INHALATION)
Qty: 360 ML | Refills: 3 | Status: SHIPPED | OUTPATIENT
Start: 2022-07-11

## 2022-07-11 RX ORDER — BUMETANIDE 1 MG/1
1 TABLET ORAL 2 TIMES DAILY
Qty: 180 TABLET | Refills: 3 | Status: SHIPPED | OUTPATIENT
Start: 2022-07-11 | End: 2022-07-13 | Stop reason: SDUPTHER

## 2022-07-11 NOTE — SIGNIFICANT EVENT
[x] [address] [facility] [address] Occupational Therapy       [address] [facility], [address]       Phone: [phone]       Fax: [phone] [] [facility] at [address] [facility]. [address] [place], [place]       Phone: [phone]       Fax: [phone]          Occupational Therapy Cancel/No Show note    Date: 2022  Patient: Usama Smith. : 1965  MRN: [id number]    Cancels/No Shows to date: 1    For today's appointment patient:    [x]  Cancelled    [] Rescheduled appointment    [] No-show     Reason given by patient:    []  Patient ill    []  Conflicting appointment    [] No transportation      [] Conflict with work    [] No reason given    [] Weather related    [] COVID-19    [x] Other:      Comments: Pt's daughter calls on  to cancel today's appt and reports that pt is in too much pain. They will call back when pt is ready to schedule.        [] Next appointment was confirmed      Electronically signed by: Wicho Blevins, OT

## 2022-07-18 NOTE — TELEPHONE ENCOUNTER
Pharmacy requesting refill of gabapentin (NEURONTIN) 300 MG capsule       Medication active on med list yes      Date of last Rx: 2/11/2022 with 2 refills          verified by GLORIA JONES      Date of last appointment 2/11/2022    Next Visit Date: 10/11/2022

## 2022-07-19 RX ORDER — GABAPENTIN 300 MG/1
CAPSULE ORAL
Qty: 90 CAPSULE | Refills: 2 | Status: SHIPPED | OUTPATIENT
Start: 2022-07-19 | End: 2022-11-01

## 2022-07-28 ENCOUNTER — TELEPHONE (OUTPATIENT)
Dept: PHARMACY | Age: 57
End: 2022-07-28

## 2022-07-28 ENCOUNTER — HOSPITAL ENCOUNTER (OUTPATIENT)
Dept: PHARMACY | Age: 57
Setting detail: THERAPIES SERIES
Discharge: HOME | End: 2022-07-28
Payer: COMMERCIAL

## 2022-07-28 DIAGNOSIS — E66.9 DIABETES MELLITUS TYPE 2 IN OBESE (HCC): ICD-10-CM

## 2022-07-28 DIAGNOSIS — E11.42 TYPE 2 DIABETES MELLITUS WITH DIABETIC POLYNEUROPATHY, WITH LONG-TERM CURRENT USE OF INSULIN (HCC): ICD-10-CM

## 2022-07-28 DIAGNOSIS — Z79.4 TYPE 2 DIABETES MELLITUS WITH DIABETIC POLYNEUROPATHY, WITH LONG-TERM CURRENT USE OF INSULIN (HCC): ICD-10-CM

## 2022-07-28 DIAGNOSIS — E11.69 DIABETES MELLITUS TYPE 2 IN OBESE (HCC): ICD-10-CM

## 2022-07-28 PROCEDURE — 99213 OFFICE O/P EST LOW 20 MIN: CPT

## 2022-07-28 RX ORDER — INSULIN HUMAN 500 [IU]/ML
INJECTION, SOLUTION SUBCUTANEOUS
Qty: 60 ML | Refills: 3 | Status: SHIPPED | OUTPATIENT
Start: 2022-07-28

## 2022-07-28 RX ORDER — FLASH GLUCOSE SENSOR
KIT MISCELLANEOUS
Qty: 6 EACH | Refills: 3 | Status: SHIPPED | OUTPATIENT
Start: 2022-07-28

## 2022-07-28 NOTE — DISCHARGE INSTRUCTIONS
Try to not skip dinner. Eat late even if you sleep past dinner. If you dont feel like eating a normal dinner have some peanut butter and crackers or pretzels or the 1/2 subway sandwich. Take insulin 0.6 with breakfast; 0.50 with lunch and 0.25 with dinner.     You can increase to 0.3 with dinner if blood sugar 250 or more    Call dermatologist.

## 2022-07-28 NOTE — TELEPHONE ENCOUNTER
Refills of insulin U500 and Freestyle Lianne sensors sent to [facility]. Yesenia Rizvi RPH,Pharm. D,, BCPS, [facility]  7/28/2022  9:42 AM

## 2022-07-28 NOTE — PROGRESS NOTES
Diabetic Medication Management   [facility] [address]    [address]. [place], [address] [facility]  Phone: [phone]  Fax: [phone]    NAME: Dom Maldonado. MEDICAL RECORD NUMBER:  [id number]  AGE: 61 y.o. GENDER: male  : 1965  EPISODE DATE:  2022       Mr. Demetria Duran was referred to [facility] by Dr. Polly White, Special instructions include: titrate all medications (as defined in clinic's policy and procedure)    Patient seen in office. Goals per referral:   Fasting blood glucose: < 130  Peak postprandial glucose: < 180  A1C: < 7    Other goals:  Blood pressure goal: 130/80  Weight loss goal (~10%): Target weight  410 to be reached by date: 2022 (3-6 months)  Physical Activity goal:    Discussed but nothing formal at this time and no specific goals set  Smoking Cessation   Quit Date: Working on stopping     Cholesterol at target:   Date: Yes LDL 46  HDL 30 Trig 416 (2021)  Annual eye exam:    Date: 2022  Comprehensive annual foot exam:   Date:  2022  Annual urine Albumin and serum creatinine:   Date:  microalbumin <12 mg/L (3/2/22), SrCr 1.61mg/dL (22) eGFR 44 ml/min        Subjective   Mr. Demetria Duran is a 61 y.o. male here for the Diabetes Service for self-management education, medication review including over the counter medications and herbal products, overall well-being assessment, transition of care and any needed adjustments with updates and recommendations communicated to the referring physician. Patient's name and  verified. Patient Findings:    Patient has had a few hypoglycemic episodes but attributes them to skipping dinner. States if he falls asleep he does not want to be woken up as he has so much trouble sleeping.   If he misses dinner he eats when he gets up most of the time but admits not all of the time  Patient understands treatment of hypoglycemia and has glucose tablets, OJ and pieces of candy on hand for treatment. Reviewed treatment quantity for hypoglycemia. Patient reports having 3-5 glucose tablets or 8-10oz OJ to treat hypoglycema. Discussed dose of 4 glucose tablets or 4 oz of OJ. Patient does admit when has 8-10oz of OJ blood glucose dose go up significantly. Lunch typically at 1pm, lays down about 3-4pm to nape, dinner 5-7pm and if he sleeps late he normally will wake up by 8pm.  If wakes up late sometimes does not eat dinner. Admits to taking his insulin without eating in the evening at times. Discussed importance of eating. Patient does say he normally has snacks such PB and crackers, PB and crackers available in his room or a 1/2 [employer] sandwich if does not want to eat dinner. Again, stressed need to not skip dinner. Having more pain now as he used more pain medication in beginning of month due to fall and therefore does not have enough pain meds to finish the month. Patient no longer using CBD gummies. Abides by his pain contact he signed. Patient had appt with Dr. Leanne Nair on June 8, 2022 and had POC A1c which was 8. Patient insulin was changed to 0.6 with breakfast, 0.50 with lunch and 0.25 with dinner. Has increased to 0.3-0.35 if he has a high blood sugar (over 250) with dinner if eats a normal dinner. Next endocrinology appt on  Sept 26th. Patient got portable oxygen, patient was taken off CPAP and put on home ventilator. Patient states the alarm kept going off due to facial hair so a new mask was ordered that will deal with facial hair. Using CPAP in interim. Psoriasis is better than last time but better than last visit due to patient has not been digging. Patient is not using anything for it as he states the cream he has does not work for his hand. Again discussed benefit of seeing a dermatologist.    Resumed starter pack of Varenicline and is now on 1mg twice a day. Helps keep urge away. Has not smoked in last 3 days.   When smoked last he smoked 2 cigarettes. Up to 5 a day. Following with Dr. Destinee Melchor. Patient reports he is gradually quitting again. Patient is not drinking any regular pop. Has one \"zero\" pop per day. Has cut portions down significantly. Needs refill on sensors, and insulin  Prescriptions sent to [facility]. Has enough syringes, glucose testing strips and lancets.     []  Missed doses   []  Emergency Room Visit    []  Medication changes  []  Hospitalization   []  Diet changes   []  Acute illness   []  Activity changes      Symptoms of hypoglycemia    []  None    []  Shakiness    [x]  Lightheadedness or dizziness   []  Confusion      []  Sweating   [x]  Other wakes up from sleeping       Symptoms of hyperglycemia -.    []  None   [x]  Frequent urination    []  Increased thirst   []  Other    Medication adverse reactions (none due to diabetic medicaitons)   [x]  None   []  Diarrhea / Nausea / Vomiting / Constipation / flatulence   []  Hypertension   []  Peripheral edema     []  Signs of infection   []  Headache   []  Vision changes   []  Increased cholesterol    []  Weight gain   []  Change in renal function   []  Increased potassium  []  Other          If recent hospital admission / ED visit, was this related to Diabetes No:Chest pain      Objective     PMHx:    Past Medical History:   Diagnosis Date    Acute kidney injury superimposed on CKD ([address] 4/10/2013    Acute on chronic congestive heart failure ([address]     Acute on chronic kidney failure ([address] 07/20/2017    Acute on chronic respiratory failure ([address] 10/02/2018    Acute on chronic respiratory failure with hypoxia ([address] 9/30/2021    Adhesive capsulitis of left shoulder 03/25/2017    Anemia, normocytic normochromic 9/12/2021    Anxiety 10/02/2016    smokes marijuana for this    Arthropathy, unspecified, other specified sites 06/13/2013    Asthma     B12 deficiency     Bilateral lower leg cellulitis 02/17/2016    Blood in stool     CAD (coronary artery disease) Cardiovascular stress test abnormal 2018    Cellulitis of both lower extremities 05/25/2017    Cellulitis of leg, left 07/20/2017    CHF (congestive heart failure), NYHA class III ([address] 08/14/2013    Chronic back pain     Chronic bronchitis (HCC)     Chronic headaches     was referred to neuro, testing scheduled    Chronic infective otitis externa 4/28/2017    Chronic kidney disease     Chronic malignant otitis externa of both ears 7/24/2020    Chronic respiratory failure (HCC)     was on vent    Chronic ulcer of left leg, with fat layer exposed ([address] 02/22/2019    healed    Class 2 severe obesity due to excess calories with serious comorbidity and body mass index (BMI) of 35.0 to 35.9 in adult (HCC)     (BMI 35.0-39.9 without comorbidity)    COPD exacerbation ([address] 11/02/2016    Diabetic neuropathy ([address] 08/14/2013    Displacement of lumbar intervertebral disc without myelopathy 06/13/2013    Ear infection     RIGHT    Elevated troponin     Essential hypertension     Facial cellulitis 2012    Fall 03/25/2017    GERD (gastroesophageal reflux disease)     Head injury     Hearing loss in right ear     pencil pierced ear as a child    Hepatic steatosis 12/03/2015    History of general anesthesia complication     has woke up during surgery under anesthesia    History of rib fracture 12/03/2015    Chronic     Hyperlipidemia     Hypersomnia     can go multiple days without sleeping    Hypertension     Insomnia     Intolerance of continuous positive airway pressure (CPAP) ventilation 07/20/2017    Iron deficiency     Localized rash     gets frequently in axilla, groin, in any fold, on several topical treatments for this    Lymphedema of both lower extremities 4/27/2021    Magnesium deficiency     Mastoiditis of left side     Mixed conductive and sensorineural hearing loss of both ears 01/10/2017    Per ENT    Mixed type COPD (chronic obstructive pulmonary disease) ([address]     On home O2, multiple inhlaers, nebulizer Pertinent Labs:    Lab Results   Component Value Date    LABA1C 7.8 (H) 06/06/2022    LABA1C 8.8 02/24/2022    LABA1C 8.8 02/24/2022     Lab Results   Component Value Date    CHOL 137 09/12/2021    TRIG 416 (H) 09/12/2021    HDL 30 (L) 09/12/2021     Lab Results   Component Value Date    CREATININE 1.50 (H) 07/03/2022    BUN 31 (H) 07/03/2022     07/03/2022    K 3.7 07/03/2022    CL 99 07/03/2022    CO2 30 07/03/2022     Lab Results   Component Value Date/Time    ALT 12 06/06/2022 09:37 AM         Wt Readings from Last 3 Encounters:   07/03/22 (!) 412 lb (186.9 kg)   06/29/22 (!) 412 lb (186.9 kg)   06/21/22 (!) 428 lb (194.1 kg)       Current medications:  Prior to Admission medications    Medication Sig Start Date End Date Taking? Authorizing Provider   insulin regular human (HUMULIN R) 500 UNIT/ML concentrated injection vial Patient using 0.60ml with breakfast, 0.50 ml with lunch and 0.25 ml with dinner. 7/28/22   Paige Bryant MD   Continuous Blood Gluc Sensor (FREESTYLE CRISTINE 14 DAY SENSOR) [facility] [place] AS DIRECTED TO MONITOR BLOOD SUGAR AND CHANGE EVERY 14 DAYS 7/28/22   Debra Calvillo MD   gabapentin (NEURONTIN) 300 MG capsule TAKE ONE CAPSULE BY MOUTH THREE TIMES A DAY 7/19/22 10/19/22  Jacek Levi MD   bumetanide (BUMEX) 1 MG tablet Take 3 tablets by mouth 2 times daily Dose increased 7/13/2022 7/13/22   Debra Calvillo MD   albuterol (PROVENTIL) (2.5 MG/3ML) 0.083% nebulizer solution USE THREE MILLILITERS ( ONE VIAL) VIA NEBULIZATION BY MOUTH EVERY 6 HOURS AS NEEDED FOR WHEEZING OR FOR SHORTNESS OF BREATH 7/11/22   Debra Calvillo MD   oxyCODONE HCl (OXY-IR) 10 MG immediate release tablet Take 1 tablet by mouth every 8 hours as needed for Pain for up to 30 days.  7/5/22 8/4/22  Paige Bryant MD   tiZANidine (ZANAFLEX) 4 MG tablet TAKE ONE TABLET BY MOUTH EVERY 8 HOURS AS NEEDED FOR MUSCLE SPASMS 6/6/22   Debra Calvillo MD   allopurinol (ZYLOPRIM) 300 MG tablet Take 1 tablet by mouth daily Dose increased 12/9/2021 . Stop if any rash develops 6/6/22   Marie Magaña MD   albuterol sulfate  (90 Base) MCG/ACT inhaler INHALE TWO PUFFS BY MOUTH EVERY 6 HOURS AS NEEDED FOR WHEEZING OR FOR SHORTNESS OF BREATH 6/6/22   Paige Bryant MD   CHANTIX STARTING MONTH GUALBERTO 0.5 MG X 11 & 1 MG X 42 tablet  5/17/22   Historical Provider, MD   lisinopril (PRINIVIL;ZESTRIL) 2.5 MG tablet Take 1 tablet by mouth daily Dose decreased 3/2/2022 5/31/22   Marie Magaña MD   amitriptyline (ELAVIL) 50 MG tablet TAKE ONE TABLET BY MOUTH EVERY NIGHT AT BEDTIME 5/23/22   Luke Johnson MD   blood glucose monitor strips Test 3 times a day & as needed for symptoms of irregular blood glucose. Dispense sufficient amount for indicated testing frequency plus additional to accommodate PRN testing needs. One touch Ultra blue. To be used if Chew not working or to double check sugars. 5/11/22   Marie Magaña MD   Continuous Blood Gluc Sensor (FREESTYLE CRISTINE 14 DAY SENSOR) [facility] USE AS DIRECTED AND CHANGE EVERY 14 DAYS 5/11/22 7/28/22  Marie Magaña MD   rosuvastatin (CRESTOR) 10 MG tablet Take 1 tablet by mouth nightly Stop pravastatin 5/9/22   Marie Magaña MD   insulin regular human (HUMULIN R) 500 UNIT/ML concentrated injection vial Patient using 0.60ml with breakfast, 0.55ml with lunch and 0.25 ml with dinner. Patient taking differently: Patient using 0.60ml with breakfast, 0.50 ml with lunch and 0.25 ml with dinner. 3/7/22 7/28/22  Marie Magaña MD   vitamin D3 (CHOLECALCIFEROL) 25 MCG (1000 UT) TABS tablet Take 1 tablet by mouth daily 3/2/22   Marie Magaña MD   nystatin (MYCOSTATIN) 228065 UNIT/GM powder Apply 2 times daily in the skin folds for long term.  3/2/22   Marie Magaña MD   chlorhexidine (HIBICLENS) 4 % external liquid Use once or twice a day to clean the left armpit, diluted in water and wash the left armpit with it 3/2/22   Benjamin Martines MD   venlafaxine (EFFEXOR XR) 150 MG extended release capsule Take 1 capsule by mouth every morning Dose increased 3/2/2022 3/2/22   Benjamin Martines MD   naloxone 4 MG/0.1ML LIQD nasal spray 1 spray by Nasal route as needed for Opioid Reversal Patient needs counseling 2/27/22   Benjamin Martines MD   butalbital-acetaminophen-caffeine (FIORICET, ESGIC) -63 MG per tablet Take 1 tablet by mouth every 8 hours as needed for Headaches 2/11/22   uJice Farooq MD   MAGNESIUM-OXIDE 400 (241.3 Mg) MG TABS tablet Take 1 tablet by mouth 2 times daily Frequency increased to 10/20/2022 2/10/22   Benjamin Martines MD   pantoprazole (PROTONIX) 40 MG tablet Take 1 tablet by mouth every morning (before breakfast) 1/28/22   Benjamin Martines MD   nystatin (MYCOSTATIN) 048093 UNIT/ML suspension Take 5 mLs by mouth 4 times daily Swish and swallow 1/19/22   Benjamin Martines MD   FEROSUL 325 (65 Fe) MG tablet TAKE ONE TABLET BY MOUTH DAILY 12/1/21   Benjamin Martines MD   SPIRIVA RESPIMAT 2.5 MCG/ACT AERS inhaler INHALE TWO PUFFS BY MOUTH DAILY 12/1/21   Benjamin Martines MD   ADVAIR -21 MCG/ACT inhaler INHALE TWO PUFFS BY MOUTH TWICE A DAY 12/1/21   Benjamin Martines MD   metoprolol tartrate (LOPRESSOR) 50 MG tablet Take 1 tablet by mouth 2 times daily 11/17/21   Benjamin Martines MD   MUCUS RELIEF 600 MG extended release tablet  11/8/21   Historical Provider, MD   ammonium lactate (LAC-HYDRIN) 12 % lotion Apply topically daily.  10/14/21   Benjamin Martines MD   ROCKLATAN 0.02-0.005 % SOLN  10/7/21   Historical Provider, MD   epoetin leslie-epbx (RETACRIT) 3000 UNIT/ML SOLN injection Inject 1 mL into the skin three times a week 10/4/21   Isela Martinez MD   Insulin Syringe-Needle U-100 31G X 5/16\" 1 ML MISC Use to subcutaneously inject insulin three times daily 9/22/21   Benjamin Martines MD   docusate sodium (COLACE) 100 MG capsule Take 1 capsule by mouth 2 times daily For constipation 8/17/21   Vika Parker MD   latanoprost (XALATAN) 0.005 % ophthalmic solution 1 drop nightly    Historical Provider, MD   clopidogrel (PLAVIX) 75 MG tablet Take 1 tablet by mouth daily 8/11/21   Vika Parker MD   nitroGLYCERIN (NITROSTAT) 0.4 MG SL tablet DISSOLVE 1 TAB UNDER TONGUE FOR CHEST PAIN - IF PAIN REMAINS AFTER 5 MIN, CALL 911 AND REPEAT DOSE. MAX 3 TABS IN 15 MINUTES 8/2/21   Vika Parker MD   clobetasol (TEMOVATE) 0.05 % ointment Apply topically 2 times daily for psoriasis 1/27/21   Vika Parker MD   ketoconazole (NIZORAL) 2 % cream Apply twice a day for yeast infection in the skin folds, for 4 weeks 1/20/21   Vika Parker MD   Lancets MISC Use to check blood sugar three times daily along with when necessary due to symptoms. 9/30/20   Jillian Toussaint MD   vitamin B-12 (CYANOCOBALAMIN) 500 MCG tablet Take 1 tablet by mouth daily 7/13/20   Vika Parker MD   Oxygen Tubing MISC by Does not apply route DX COPD. chronic respiratory failure 3/26/20   Vika Parker MD   Respiratory Therapy Supplies (NEBULIZER/TUBING/MOUTHPIECE) KIT Dx COPD needs nebulizer supplies 2/20/20   Vika Parker MD   ONE TOUCH ULTRASOFT LANCETS [address] [facility] Patient to test blood sugar up to 4 times daily. 11/7/19   Jillian Toussaint MD   Lancet Devices (LANCING DEVICE) MISC Provide patient with lancing device appropriate for his machine/lancing needles. 6/4/19   Vika Parker MD   Handicap Placard MISC by Does not apply route Can't walk greater than 200 feet. Expires in 5 years.  2/13/19   Paige Bryant MD   fluticasone (CUTIVATE) 0.05 % cream Apply topically 2 times daily  4/19/17   Historical Provider, MD   fluticasone (FLONASE) 50 MCG/ACT nasal spray 2 sprays by Nasal route daily  Patient taking differently: 2 sprays by Nasal route daily as needed (sinus symptoms)  1/16/17   Vika Parker MD   Melatonin 10 MG TABS Take 10 mg by mouth nightly as needed (insomnia) 12/23/16   Ron Fraser MD   aspirin 81 MG EC tablet Take 81 mg by mouth daily. Historical Provider, MD       Immunizations:   Most Recent Immunizations   Administered Date(s) Administered    COVID-19, MODERNA BLUE border, Primary or Immunocompromised, (age 12y+), IM, 100 mcg/0.5mL 12/15/2021    COVID-19, PFIZER GRAY top, DO NOT Dilute, (age 15 y+), IM, 30 mcg/0.3 mL 04/23/2022    DT (pediatric) 12/14/1998    Hepatitis B Adult (Engerix-B) 12/04/2019    Hepatitis B Adult (Recombivax HB) 12/04/2019    Influenza Virus Vaccine 10/12/2015    Influenza, MDCK Quadv, IM, PF (Flucelvax 2 yrs and older) 10/14/2021    Influenza, Quadv, IM, PF (6 mo and older Fluzone, Flulaval, Fluarix, and 3 yrs and older Afluria) 10/09/2020    Pneumococcal Polysaccharide (Zolcoxobj55) 05/23/2013    Tdap (Boostrix, Adacel) 09/12/2018    Zoster Recombinant (Shingrix) 02/14/2022       Home Blood Glucose Results:   Per patient's Freestyle Lianne report below                             Patient's blood sugar is overall well controlled. Will make changes as discussed above to reduce hypoglycemia. Assessment     A1c at goal:No: 8 (6/8/22)  Blood Pressure at goal: Yes  Weight at goal: No:    Physical activity: No  Physical activity at goal: No    Smoking Cessation: Yes    Cholesterol at target: Yes  Annual eye exam completed: Yes  Comprehensive Foot Exam Completed:  Yes  Annual urine albumin and serum creatinine: Yes    Statin: Yes    Appropriate?: Yes  Changes made: refill provided    ACE/ARB: Yes  Appropriate?: Yes  Changes made:     Aspirin: Yes  Appropriate?: Yes  Changes made:     Eating patterns:    [x]  My plate    []  Mediterranean diet   []  Low sodium   []  DASH diet   [x]  Portion control   [x]  Reduced calorie    []  Fast food / Restaurant  []  High glycemic index foods   []  Sugary beverages   []  Other     Comment: see above    Current Medications Affecting Diabetes:  Humulin R 500    Compliant:No  Barriers to medication therapy: anxiety / depression    Medications attempted in the past:  Metformin - cardiologist took him off but patient unsure why  Januvia - PCP took him off but patient unsure why    Recent exacerbations / new problems:  Gout / Chest pain/pressure    Last office dictation reviewed: yes        type 2 DM under improving control as evidenced by A1C 8 on 6/6/22     Plan   Counseling at today's visit:     -Continued monitoring of blood glucose with use of Freestyle Lianne. Call with any issues with sensor. Bring data cord to each visit.      -Continue taking insulin on regular basis:  insulin at .6 at breakfast time, 50 at lunch time and .25 with dinner. Only increase to 0.3 with dinner if blood sugar above 250. Do not skip dinner. Eat after you wake up or have a snack at the least.     Call dermatologist for appointment. Physician Follow-up:   Dr Zaki Ambriz     Medication Management Follow-up:   Diabetes Service   6weeks     Electronically signed by Gonzalo Joseph [facility] on 7/28/2022 at 9:43 AM     For Pharmacy Admin Tracking Only    CPA in place:  Yes  Recommendation Provided To: Patient/Caregiver: 4 via In person  Intervention Detail: Dose Adjustment: 1, reason: Therapy Optimization, New Rx: 1, reason: Needs Additional Therapy, Refill(s) Provided and Scheduled Appointment  Intervention Accepted By: Patient/Caregiver: 4  Time Spent (min): 45     Yesenia Rizvi RPH,Pharm. D,, BCPS, CACP  7/28/2022  9:43 AM

## 2022-08-01 DIAGNOSIS — K21.9 GASTROESOPHAGEAL REFLUX DISEASE WITHOUT ESOPHAGITIS: ICD-10-CM

## 2022-08-01 RX ORDER — PANTOPRAZOLE SODIUM 40 MG/1
40 TABLET, DELAYED RELEASE ORAL
Qty: 90 TABLET | Refills: 1 | Status: SHIPPED | OUTPATIENT
Start: 2022-08-01

## 2022-08-03 DIAGNOSIS — M54.42 CHRONIC MIDLINE LOW BACK PAIN WITH LEFT-SIDED SCIATICA: ICD-10-CM

## 2022-08-03 DIAGNOSIS — G89.29 CHRONIC MIDLINE LOW BACK PAIN WITH LEFT-SIDED SCIATICA: ICD-10-CM

## 2022-08-03 DIAGNOSIS — M51.36 DDD (DEGENERATIVE DISC DISEASE), LUMBAR: ICD-10-CM

## 2022-08-03 DIAGNOSIS — M48.061 SPINAL STENOSIS OF LUMBAR REGION WITHOUT NEUROGENIC CLAUDICATION: ICD-10-CM

## 2022-08-03 DIAGNOSIS — M96.1 POSTLAMINECTOMY SYNDROME OF LUMBAR REGION: ICD-10-CM

## 2022-08-03 RX ORDER — OXYCODONE HYDROCHLORIDE 10 MG/1
10 TABLET ORAL EVERY 8 HOURS PRN
Qty: 90 TABLET | Refills: 0 | Status: SHIPPED | OUTPATIENT
Start: 2022-08-03 | End: 2022-09-01 | Stop reason: SDUPTHER

## 2022-08-08 ENCOUNTER — HOSPITAL ENCOUNTER (OUTPATIENT)
Age: 57
Discharge: HOME | End: 2022-08-08
Payer: COMMERCIAL

## 2022-08-08 LAB
ANION GAP SERPL CALCULATED.3IONS-SCNC: 11 MMOL/L (ref 9–17)
BUN BLDV-MCNC: 31 MG/DL (ref 6–20)
CALCIUM SERPL-MCNC: 9.3 MG/DL (ref 8.6–10.4)
CHLORIDE BLD-SCNC: 99 MMOL/L (ref 98–107)
CO2: 28 MMOL/L (ref 20–31)
CREAT SERPL-MCNC: 1.52 MG/DL (ref 0.7–1.2)
GFR AFRICAN AMERICAN: 58 ML/MIN
GFR NON-AFRICAN AMERICAN: 48 ML/MIN
GFR SERPL CREATININE-BSD FRML MDRD: ABNORMAL ML/MIN/{1.73_M2}
GLUCOSE BLD-MCNC: 189 MG/DL (ref 70–99)
POTASSIUM SERPL-SCNC: 3.8 MMOL/L (ref 3.7–5.3)
PRO-BNP: 146 PG/ML
SODIUM BLD-SCNC: 138 MMOL/L (ref 135–144)

## 2022-08-08 PROCEDURE — 80048 BASIC METABOLIC PNL TOTAL CA: CPT

## 2022-08-08 PROCEDURE — 36415 COLL VENOUS BLD VENIPUNCTURE: CPT

## 2022-08-08 PROCEDURE — 83880 ASSAY OF NATRIURETIC PEPTIDE: CPT

## 2022-08-08 NOTE — RESULT ENCOUNTER NOTE
Fozia comment sent to patient.   Blood Glucose 189  Stable Chronic kidney disease       Future Appointments  8/9/2022   11:00 AM   [facility] CHF CLINIC [facility] [id number]  9/6/2022   8:50 AM    [facility] MEDICATION MGMT       [facility] MED MGMT        [facility]  10/11/2022 9:30 AM    Anaya Hendrickson MD     fp [facility]  10/11/2022 11:20 AM   Virgil Hoffmann MD Neuro Spec          [facility]  11/1/2022  10:15 AM   Raymond Ennis MD     [facility]        [facility]  3/7/2023   9:00 AM    Anaya Hendrickson MD     [facility]

## 2022-08-09 ENCOUNTER — HOSPITAL ENCOUNTER (OUTPATIENT)
Dept: OTHER | Age: 57
Discharge: HOME | End: 2022-08-09
Payer: COMMERCIAL

## 2022-08-09 VITALS
SYSTOLIC BLOOD PRESSURE: 126 MMHG | DIASTOLIC BLOOD PRESSURE: 84 MMHG | OXYGEN SATURATION: 96 % | HEART RATE: 95 BPM | HEIGHT: 72 IN | RESPIRATION RATE: 24 BRPM | BODY MASS INDEX: 42.66 KG/M2 | WEIGHT: 315 LBS

## 2022-08-09 PROCEDURE — 99211 OFF/OP EST MAY X REQ PHY/QHP: CPT

## 2022-08-09 NOTE — PROGRESS NOTES
Pt here for CHF Clinic visit with daughter,  grand daughter per wheelchair with 3L NC. Pt weight stable, down 1 lb since last visit but states he has been more thirsty and has taken in more fluids. Bumex dose was changed from 2mg to 3mg BID recently and pt states he is voiding frequently and often. Pt short of breath with exertion, keeps O2 on and states his breathing has been like with for a long time and has not gotten worse. Pt states he went back to smoking and has been started on Chantix and is compliant. Lung sounds clear and diminished. 2+ bilat lower leg pitting edema noted, dry flaky discolored skin noted. No more wound care. Pt states he is went from wearing a CPAP to a NIV machine and states he is still trying to get used to it. Pt states he eats whatever kinds of foods he wants but is not able to finish a plate due to feeling full. Pt states he makes healthy choices and watches the sodium content in his foods. Labs reviewed. BNP stable at 146, BMP stable. Vital signs stable and pt states he is compliant with medications. Pt to see Dr Jhon Hale in October. Will schedule next CHF visit for Tues Sept 6 at 10 am after pt sees Med Management. Verbally reviewed importance of medication compliance with patient; patient verbalized understanding. Discussed 2000mg/day sodium restricted diet; patient verbalized understanding. Moderate daily exercise encouraged as tolerated. Discussed rest breaks as needed; patient verbalized understanding. Patient instructed to weigh self at the same time of each day, using same clothes and same scale; reinforced teaching to monitor for 3-5 lb weight increase over 1-2 days, and to notify the CHF clinic at [phone] or physician office if weight change noted. Patient verbalized understanding. Risks of smoking discussed with the patient if applicable; patient strongly discouraged to smoke. Patient verbalized understanding.     Signs and symptoms of CHF discussed with patient, such as feeling more tired than normal, feeling short of breath, coughing that increases when you lie down, sudden weight gain, swelling of your feet, legs or belly. Patient verbalized understanding to notify the CHF clinic at [phone] or physician office if these symptoms occur. Compliance with plan of care and further disease process causes discussed with patient, patient encouraged to keep all follow up appointments. Patient verbalized understanding.

## 2022-08-20 DIAGNOSIS — J44.9 MIXED TYPE COPD (CHRONIC OBSTRUCTIVE PULMONARY DISEASE) (HCC): ICD-10-CM

## 2022-08-22 DIAGNOSIS — G43.009 MIGRAINE WITHOUT AURA AND WITHOUT STATUS MIGRAINOSUS, NOT INTRACTABLE: Primary | ICD-10-CM

## 2022-08-22 RX ORDER — AMITRIPTYLINE HYDROCHLORIDE 50 MG/1
TABLET, FILM COATED ORAL
Qty: 90 TABLET | Refills: 0 | OUTPATIENT
Start: 2022-08-22

## 2022-08-22 RX ORDER — NEBULIZER ACCESSORIES
KIT MISCELLANEOUS
Qty: 1 KIT | Refills: 11 | Status: SHIPPED | OUTPATIENT
Start: 2022-08-22

## 2022-08-22 NOTE — TELEPHONE ENCOUNTER
Pharmacy requesting refill of Amitriptyline 50 mg.      Medication active on med list yes      Date of last Rx: 05/23/22 #90 tablets with 0 refills          verified by GLORIA ALLEN      Date of last appointment 02/11/2022    Next Visit Date:  10/11/2022

## 2022-08-23 RX ORDER — AMITRIPTYLINE HYDROCHLORIDE 50 MG/1
TABLET, FILM COATED ORAL
Qty: 60 TABLET | Refills: 11 | Status: SHIPPED | OUTPATIENT
Start: 2022-08-23 | End: 2022-09-06

## 2022-09-01 DIAGNOSIS — M51.36 DDD (DEGENERATIVE DISC DISEASE), LUMBAR: ICD-10-CM

## 2022-09-01 DIAGNOSIS — M48.061 SPINAL STENOSIS OF LUMBAR REGION WITHOUT NEUROGENIC CLAUDICATION: ICD-10-CM

## 2022-09-01 DIAGNOSIS — M54.42 CHRONIC MIDLINE LOW BACK PAIN WITH LEFT-SIDED SCIATICA: ICD-10-CM

## 2022-09-01 DIAGNOSIS — G89.29 CHRONIC MIDLINE LOW BACK PAIN WITH LEFT-SIDED SCIATICA: ICD-10-CM

## 2022-09-01 DIAGNOSIS — M96.1 POSTLAMINECTOMY SYNDROME OF LUMBAR REGION: ICD-10-CM

## 2022-09-01 RX ORDER — OXYCODONE HYDROCHLORIDE 10 MG/1
10 TABLET ORAL EVERY 8 HOURS PRN
Qty: 90 TABLET | Refills: 0 | Status: SHIPPED | OUTPATIENT
Start: 2022-09-01 | End: 2022-09-29 | Stop reason: SDUPTHER

## 2022-09-01 RX ORDER — AMITRIPTYLINE HYDROCHLORIDE 50 MG/1
TABLET, FILM COATED ORAL
Qty: 90 TABLET | Refills: 0 | Status: SHIPPED | OUTPATIENT
Start: 2022-09-01

## 2022-09-01 NOTE — TELEPHONE ENCOUNTER
Please Approve or Refuse.   Send to Pharmacy per Pt's Request:      Next Visit Date:  10/11/2022   Last Visit Date: 5/31/2022    Hemoglobin A1C (%)   Date Value   06/06/2022 7.8 (H)   02/24/2022 8.8   02/24/2022 8.8             ( goal A1C is < 7)   BP Readings from Last 3 Encounters:   08/09/22 126/84   07/03/22 (!) 138/108   06/29/22 (!) 120/55          (goal 120/80)  BUN   Date Value Ref Range Status   08/08/2022 31 (H) 6 - 20 mg/dL Final     Creatinine   Date Value Ref Range Status   08/08/2022 1.52 (H) 0.70 - 1.20 mg/dL Final     Potassium   Date Value Ref Range Status   08/08/2022 3.8 3.7 - 5.3 mmol/L Final

## 2022-09-02 ENCOUNTER — HOSPITAL ENCOUNTER (OUTPATIENT)
Age: 57
Discharge: HOME | End: 2022-09-02
Payer: COMMERCIAL

## 2022-09-02 ENCOUNTER — TELEPHONE (OUTPATIENT)
Dept: FAMILY MEDICINE CLINIC | Age: 57
End: 2022-09-02

## 2022-09-02 LAB
ANION GAP SERPL CALCULATED.3IONS-SCNC: 13 MMOL/L (ref 9–17)
BUN BLDV-MCNC: 32 MG/DL (ref 6–20)
CALCIUM SERPL-MCNC: 9.3 MG/DL (ref 8.6–10.4)
CHLORIDE BLD-SCNC: 98 MMOL/L (ref 98–107)
CO2: 27 MMOL/L (ref 20–31)
CREAT SERPL-MCNC: 1.61 MG/DL (ref 0.7–1.2)
GFR AFRICAN AMERICAN: 54 ML/MIN
GFR NON-AFRICAN AMERICAN: 44 ML/MIN
GFR SERPL CREATININE-BSD FRML MDRD: ABNORMAL ML/MIN/{1.73_M2}
GLUCOSE BLD-MCNC: 221 MG/DL (ref 70–99)
POTASSIUM SERPL-SCNC: 4.3 MMOL/L (ref 3.7–5.3)
PRO-BNP: 341 PG/ML
SODIUM BLD-SCNC: 138 MMOL/L (ref 135–144)

## 2022-09-02 PROCEDURE — 83880 ASSAY OF NATRIURETIC PEPTIDE: CPT

## 2022-09-02 PROCEDURE — 36415 COLL VENOUS BLD VENIPUNCTURE: CPT

## 2022-09-02 PROCEDURE — 80048 BASIC METABOLIC PNL TOTAL CA: CPT

## 2022-09-02 NOTE — TELEPHONE ENCOUNTER
SPOKE WITH PATIENT REGARDING LAB RESULTS STATES HE DOES HAVE S.O.B. STATES HIS CPAP MACHINE WAS SWITCHED TO A VENTILATOR TYPE MACHINE. STATES THE FILTER IS NASTY AND DIRTY AND NO NEW ONE TO REPLACE IT. HE DID STATE HE CALLED COMPANY AND IS WAITING FOR THEM TO RETURN CALL REGARDING THIS    HE DID NOT FAST FOR LABS STATES HE WASN'T INFORMED.

## 2022-09-03 DIAGNOSIS — E55.9 VITAMIN D DEFICIENCY: ICD-10-CM

## 2022-09-06 ENCOUNTER — HOSPITAL ENCOUNTER (OUTPATIENT)
Dept: OTHER | Age: 57
Discharge: HOME | End: 2022-09-06
Payer: COMMERCIAL

## 2022-09-06 ENCOUNTER — TELEPHONE (OUTPATIENT)
Dept: PHARMACY | Age: 57
End: 2022-09-06

## 2022-09-06 ENCOUNTER — HOSPITAL ENCOUNTER (OUTPATIENT)
Dept: PHARMACY | Age: 57
Setting detail: THERAPIES SERIES
Discharge: HOME | End: 2022-09-06
Payer: COMMERCIAL

## 2022-09-06 VITALS
DIASTOLIC BLOOD PRESSURE: 70 MMHG | BODY MASS INDEX: 42.66 KG/M2 | SYSTOLIC BLOOD PRESSURE: 128 MMHG | HEIGHT: 72 IN | OXYGEN SATURATION: 98 % | WEIGHT: 315 LBS | RESPIRATION RATE: 24 BRPM | HEART RATE: 80 BPM

## 2022-09-06 PROCEDURE — 99211 OFF/OP EST MAY X REQ PHY/QHP: CPT

## 2022-09-06 PROCEDURE — 99213 OFFICE O/P EST LOW 20 MIN: CPT

## 2022-09-06 RX ORDER — GLUCOSAMINE HCL/CHONDROITIN SU 500-400 MG
CAPSULE ORAL
Qty: 300 STRIP | Refills: 3 | Status: SHIPPED | OUTPATIENT
Start: 2022-09-06

## 2022-09-06 RX ORDER — MELATONIN
Qty: 90 TABLET | Refills: 1 | Status: SHIPPED | OUTPATIENT
Start: 2022-09-06

## 2022-09-06 RX ORDER — VARENICLINE TARTRATE 1 MG/1
1 TABLET, FILM COATED ORAL 2 TIMES DAILY
COMMUNITY

## 2022-09-06 NOTE — DISCHARGE INSTRUCTIONS
Try to not skip meals. If you dont feel like eating a normal meal have some peanut butter and crackers or pretzels or sandwich. Change your insulin to  0.65 with breakfast; 0.50 with dinner and 0.25 with evening snack. DO NOT TAKE insulin if you do not eat. Due for cholesterol at next visit. Do not eat breakfast or drink anything other than water before next visit.      Call dermatologist.

## 2022-09-06 NOTE — TELEPHONE ENCOUNTER
Refill of glucose test strips #300 with 3 refills sent to [facility] on [facility]. Yesenia Rizvi RPH,Pharm. D,, BCPS, [facility]  9/6/2022  9:39 AM

## 2022-09-06 NOTE — PROGRESS NOTES
Diabetic Medication Management   [facility]: TYESHA SCOTT W    [address]  Phone: [phone]  Fax: [phone]    NAME: Saul Castro. MEDICAL RECORD NUMBER:  [id number]  AGE: 61 y.o. GENDER: male  : 1965  EPISODE DATE:  2022       Mr. Cait Stratton was referred to [address] [place] [address] Medication Management Services by Dr. Aury Herbert, Special instructions include: titrate all medications (as defined in clinic's policy and procedure)    Patient seen in office. Goals per referral:   Fasting blood glucose: < 130  Peak postprandial glucose: < 180  A1C: < 7    Other goals:  Blood pressure goal: 130/80  Weight loss goal (~10%): Target weight  410 to be reached by date: 2022 (3-6 months)  Physical Activity goal:    Discussed but nothing formal at this time and no specific goals set  Smoking Cessation   Quit Date: Working on stopping     Cholesterol at target:   Date: Yes LDL 46  HDL 30 Trig 416 (2021)  Annual eye exam:    Date: 2022  Comprehensive annual foot exam:   Date:  2022  Annual urine Albumin and serum creatinine:   Date:  microalbumin <12 mg/L (3/2/22), SrCr 1.61mg/dL (22) eGFR 44 ml/min        Subjective   Mr. Cait Stratton is a 61 y.o. male here for the Diabetes Service for self-management education, medication review including over the counter medications and herbal products, overall well-being assessment, transition of care and any needed adjustments with updates and recommendations communicated to the referring physician. Patient's name and  verified. Patient Findings:    Patient reports diet has changed. Eating two meals a day - breakfast and dinner (8am/7pm). Sometimes a snack between breakfast and dinner (peanut butter crackers) and rarely after dinner. What he is eating for meals has remained the same.     Patient has been compliant with his insulin doing 0.6 with breakfast, .5 with dinner and 0.3 in the evening not associated with a meal but taken per patient when checks blood sugar and it is elevated (above 200). Admits he has at times taken insulin without eating. Discussed dangers of taking insulin when has not eaten. Patient reports haying hypoglycemia in early morning hours/over night. Discussed importance of always eating when taking insulin and to NOT take insulin if he is absolutely not going to eat. Patient was taken off CPAP and put on non-invasive ventilator. Patient states it is not easy to tolerate at alarms are going off and waking him up throughout night. Has been on for 1 month. Patient was told by [employer] he would receive call in 2 weeks for supplies/maintenance. Called last week as  no call received at 2 weeks and was told his therapist had left company and he has not received any return call as of yet. Plans to call today. States filter is viably dirty. Has appt with pulmonologist on 9/20. Patient had blood work done on 9/2/22. Patient had BMP with SrCr up to 1.61. BNP up to 341 from 146 on 8/8/22. Patient reports weighing himself once a day in the morning. States weight is fluctuating by 5 lbs each day but this is normal.  Does report more bloating in abdomen. Was told he has umbilical cord hernia and unsure what is causing abdomen issues. Plans to call cardiologist today. Patient admits to chest pains over last month. At one point he had to do three (3) nitroglycerins. Patient states he will not go to the hospital/ED due to DNR status and states his is tired and does not want anything done if it is his time. For dinner - hot dog/bun and potato salad with unsweetened tea (7pm), Ham sandwich with BBQ chips (10pm) took . 3    Patient reports having hypoglycemia in the enving. ??? treating hypoglycemia with glucose tablets, OJ, or 1/2 regular pop. Pain has continued to increase. Patient states there is nothing that can be done. States he exhausted options with pain clinics. Was released from [facility] clinic due to not agreeing to procedure (nerve burn). Has been trying to do what exercise he can. Walking small amounts. Riding stationary bike when can but reports legs go numb. Exercise gives him pain but still tries. Next appt with Dr. Leanne Nair is 9/26/22. Review of blood glucose data per [facility] (below) shows patient having elevated blood sugars in late am and early afternoon due to breakfast but then having low blood sugar after dinner/evening snack. Could benefit from slight elevation in breakfast insulin and decrease in evening snack insulin. Dinner insulin appears to be appropriately dosed. Patient has enough insulin and needles. Needs One touch ultra glucose strips. Has enough lancets. Has enough freestyle sensors. Taping sensors on but reports they are staying. Psoriasis is worse per patient and is viible cracked. Mostly right hand but also starting on left. Patient has not called dermatologist but again encouraged. Smoked one (1) cigarette in the last week just goofing around. Admits it didn't taste good at all and did cause him to be SOB and cough. Just did it because he had people tell him he couldn't. Knows he needs to just make sure he does not smoke even when people taunt him. Continues on Chantix and plans to stop at end of month.          []  Missed doses   []  Emergency Room Visit    []  Medication changes  []  Hospitalization   [x]  Diet changes   []  Acute illness   []  Activity changes      Symptoms of hypoglycemia    []  None    []  Shakiness    [x]  Lightheadedness or dizziness   []  Confusion      []  Sweating   [x]  Other wakes up from sleeping       Symptoms of hyperglycemia -.    []  None   [x]  Frequent urination    []  Increased thirst   []  Other    Medication adverse reactions (none due to diabetic medicaitons)   [x]  None   []  Diarrhea / Nausea / Vomiting / Constipation / flatulence   []  Hypertension   [] Peripheral edema     []  Signs of infection   []  Headache   []  Vision changes   []  Increased cholesterol    []  Weight gain   []  Change in renal function   []  Increased potassium  []  Other          If recent hospital admission / ED visit, was this related to Diabetes No:Chest pain      Objective     PMHx:    Past Medical History:   Diagnosis Date    Acute kidney injury superimposed on CKD ([address] 4/10/2013    Acute on chronic congestive heart failure ([facility])     Acute on chronic kidney failure ([address] 07/20/2017    Acute on chronic respiratory failure ([address] 10/02/2018    Acute on chronic respiratory failure with hypoxia ([facility]) 9/30/2021    Adhesive capsulitis of left shoulder 03/25/2017    Anemia, normocytic normochromic 9/12/2021    Anxiety 10/02/2016    smokes marijuana for this    Arthropathy, unspecified, other specified sites 06/13/2013    Asthma     B12 deficiency     Bilateral lower leg cellulitis 02/17/2016    Blood in stool     CAD (coronary artery disease)     Cardiovascular stress test abnormal 2018    Cellulitis of both lower extremities 05/25/2017    Cellulitis of leg, left 07/20/2017    CHF (congestive heart failure), NYHA class III ([address] 08/14/2013    Chronic back pain     Chronic bronchitis ([facility])     Chronic headaches     was referred to neuro, testing scheduled    Chronic infective otitis externa 4/28/2017    Chronic kidney disease     Chronic malignant otitis externa of both ears 7/24/2020    Chronic respiratory failure ([facility])     was on vent    Chronic ulcer of left leg, with fat layer exposed ([address] 02/22/2019    healed    Class 2 severe obesity due to excess calories with serious comorbidity and body mass index (BMI) of 35.0 to 35.9 in adult ([facility])     (BMI 35.0-39.9 without comorbidity)    COPD exacerbation ([address] 11/02/2016    Diabetic neuropathy ([address] 08/14/2013    Displacement of lumbar intervertebral disc without myelopathy 06/13/2013    Ear infection     RIGHT    Elevated troponin     Essential hypertension     Facial cellulitis 2012    Fall 03/25/2017    GERD (gastroesophageal reflux disease)     Head injury     Hearing loss in right ear     pencil pierced ear as a child    Hepatic steatosis 12/03/2015    History of general anesthesia complication     has woke up during surgery under anesthesia    History of rib fracture 12/03/2015    Chronic     Hyperlipidemia     Hypersomnia     can go multiple days without sleeping    Hypertension     Insomnia     Intolerance of continuous positive airway pressure (CPAP) ventilation 07/20/2017    Iron deficiency     Localized rash     gets frequently in axilla, groin, in any fold, on several topical treatments for this    Lymphedema of both lower extremities 4/27/2021    Magnesium deficiency     Mastoiditis of left side     Mixed conductive and sensorineural hearing loss of both ears 01/10/2017    Per ENT    Mixed type COPD (chronic obstructive pulmonary disease) ([address]     On home O2, multiple inhlaers, nebulizer    Moderate recurrent major depression ([address] 10/02/2016    Morbid obesity with BMI of 45.0-49.9, adult ([address] 06/16/2015    NSTEMI (non-ST elevated myocardial infarction) ([address]     On home oxygen therapy     3 Lpm prn    Open wound of groin 12/19/2018    healed     BARBY on CPAP     Osteoarthritis     Otitis externa of left ear     Pancreatitis chronic     Persistent depressive disorder 11/19/2019    Renal insufficiency     proteinuria    Seizures ([address] 6/27/2019    Severe depression ([address] 09/25/2013    Spinal stenosis of lumbar region without neurogenic claudication 01/06/2016    MRI lumbar 12/30/15 L3-L4: There is broad-based bulging disc which appears protruding left laterally causing flattening of the ventral thecal sac. In addition, there is facet arthropathy with mild hypertrophic changes. There is borderline central canal stenosis with  evidence of moderate left neural foraminal narrowing and mild right neural foramina narrowing.    L4-L5: There is broad-based protrud    Syncope 2017    Tinnitus of both ears 01/10/2017    Per ENT    Type 2 diabetes mellitus with stage 3 chronic kidney disease, with long-term current use of insulin ([facility] 2016    due to underlying condition with hyperosmolarity without coma    Type II or unspecified type diabetes mellitus without mention of complication, not stated as uncontrolled     uncontrolled    Uncontrolled type 2 diabetes mellitus with hyperglycemia ([facility] 7/15/2013    Unstable angina ([facility] 2021    Vitamin D deficiency     Wears glasses     for reading       Social History:    Social History     Tobacco Use    Smoking status: Every Day     Packs/day: 0.50     Years: 33.00     Pack years: 16.50     Types: Cigarettes     Start date: 1985     Last attempt to quit: 2020     Years since quittin.8    Smokeless tobacco: Former     Types: Snuff     Quit date: 1995   Substance Use Topics    Alcohol use: No     Alcohol/week: 0.0 standard drinks       Pertinent Labs:    Lab Results   Component Value Date    LABA1C 7.8 (H) 2022    LABA1C 8.8 2022    LABA1C 8.8 2022     Lab Results   Component Value Date    CHOL 137 2021    TRIG 416 (H) 2021    HDL 30 (L) 2021     Lab Results   Component Value Date    CREATININE 1.61 (H) 2022    BUN 32 (H) 2022     2022    K 4.3 2022    CL 98 2022    CO2 27 2022     Lab Results   Component Value Date/Time    ALT 12 2022 09:37 AM         Wt Readings from Last 3 Encounters:   22 (!) 427 lb (193.7 kg)   22 (!) 412 lb (186.9 kg)   22 (!) 412 lb (186.9 kg)       Current medications:  Prior to Admission medications    Medication Sig Start Date End Date Taking?  Authorizing Provider   varenicline (CHANTIX) 1 MG tablet Take 1 mg by mouth 2 times daily   Yes Historical Provider, MD   insulin regular human (HUMULIN R) 500 UNIT/ML concentrated injection vial Patient using 0.60ml with breakfast, 0.50 ml with lunch and 0.25 ml with dinner. Patient taking differently: Patient using 0.65ml with breakfast, 0.50 ml with dinner and 0.25 ml with evening snack. 7/28/22  Yes Stephanie Bain MD   amitriptyline (ELAVIL) 50 MG tablet TAKE ONE TABLET BY MOUTH EVERY NIGHT AT BEDTIME 9/1/22   Daisy Sol MD   oxyCODONE HCl (OXY-IR) 10 MG immediate release tablet Take 1 tablet by mouth every 8 hours as needed for Pain for up to 30 days. 9/1/22 10/1/22  Stephanie Bain MD   amitriptyline (ELAVIL) 50 MG tablet TAKE TWO TABLETS BY MOUTH EVERY NIGHT AT BEDTIME 8/23/22 9/6/22  NICOLE Brooks - CNP   Respiratory Therapy Supplies (NEBULIZER/TUBING/MOUTHPIECE) KIT Dx COPD needs nebulizer supplies 8/22/22   Stephanie Bain MD   pantoprazole (PROTONIX) 40 MG tablet Take 1 tablet by mouth every morning (before breakfast) 8/1/22   Stephanie Bain MD   Continuous Blood Gluc Sensor ([employer] CRISTINE 14 DAY SENSOR) [facility] USE AS DIRECTED TO MONITOR BLOOD SUGAR AND CHANGE EVERY 14 DAYS 7/28/22   Stephanie Bain MD   gabapentin (NEURONTIN) 300 MG capsule TAKE ONE CAPSULE BY MOUTH THREE TIMES A DAY 7/19/22 10/19/22  Daisy Sol MD   bumetanide (BUMEX) 1 MG tablet Take 3 tablets by mouth 2 times daily Dose increased 7/13/2022 7/13/22   Stephanie Bain MD   albuterol (PROVENTIL) (2.5 MG/3ML) 0.083% nebulizer solution USE THREE MILLILITERS ( ONE VIAL) VIA NEBULIZATION BY MOUTH EVERY 6 HOURS AS NEEDED FOR WHEEZING OR FOR SHORTNESS OF BREATH 7/11/22   Stephanie Bain MD   tiZANidine (ZANAFLEX) 4 MG tablet TAKE ONE TABLET BY MOUTH EVERY 8 HOURS AS NEEDED FOR MUSCLE SPASMS 6/6/22   Stephanie Bain MD   allopurinol (ZYLOPRIM) 300 MG tablet Take 1 tablet by mouth daily Dose increased 12/9/2021 .   Stop if any rash develops 6/6/22   Stephanie Bain MD   albuterol sulfate  (90 Base) MCG/ACT inhaler INHALE TWO PUFFS BY MOUTH EVERY 6 HOURS AS NEEDED FOR WHEEZING OR FOR SHORTNESS OF BREATH 6/6/22   Jessenia Hope MD   lisinopril (PRINIVIL;ZESTRIL) 2.5 MG tablet Take 1 tablet by mouth daily Dose decreased 3/2/2022 5/31/22   Paige Bryant MD   CHANTIX STARTING MONTH GUALBERTO 0.5 MG X 11 & 1 MG X 42 tablet  5/17/22 9/6/22  Historical Provider, MD   blood glucose monitor strips Test 3 times a day & as needed for symptoms of irregular blood glucose. Dispense sufficient amount for indicated testing frequency plus additional to accommodate PRN testing needs. One touch Ultra blue. To be used if Chew not working or to double check sugars. 5/11/22   Jessenia Hope MD   rosuvastatin (CRESTOR) 10 MG tablet Take 1 tablet by mouth nightly Stop pravastatin 5/9/22   Jessenia Hope MD   vitamin D3 (CHOLECALCIFEROL) 25 MCG (1000 UT) TABS tablet Take 1 tablet by mouth daily 3/2/22   Jessenia Hope MD   nystatin (MYCOSTATIN) 661831 UNIT/GM powder Apply 2 times daily in the skin folds for long term.  3/2/22   Jessenia Hope MD   chlorhexidine (HIBICLENS) 4 % external liquid Use once or twice a day to clean the left armpit, diluted in water and wash the left armpit with it 3/2/22   Jessenia Hope MD   venlafaxine (EFFEXOR XR) 150 MG extended release capsule Take 1 capsule by mouth every morning Dose increased 3/2/2022 3/2/22   Jessenia Hope MD   naloxone 4 MG/0.1ML LIQD nasal spray 1 spray by Nasal route as needed for Opioid Reversal Patient needs counseling 2/27/22   Jessenia Hope MD   butalbital-acetaminophen-caffeine (FIORICET, ESGIC) -09 MG per tablet Take 1 tablet by mouth every 8 hours as needed for Headaches 2/11/22   Bety Montanez MD   MAGNESIUM-OXIDE 400 (241.3 Mg) MG TABS tablet Take 1 tablet by mouth 2 times daily Frequency increased to 10/20/2022 2/10/22   Jessenia Hope MD   nystatin (MYCOSTATIN) 549750 UNIT/ML suspension Take 5 mLs by mouth 4 times daily Swish and swallow 1/19/22   MD LATANYA Freeman 680 (16 Fe) MG tablet TAKE ONE TABLET BY MOUTH DAILY 12/1/21   Rehan Freire, MD   SPIRIVA RESPIMAT 2.5 MCG/ACT AERS inhaler INHALE TWO PUFFS BY MOUTH DAILY 12/1/21   Rehan Freire, MD   ADV -21 MCG/ACT inhaler INHALE TWO PUFFS BY MOUTH TWICE A DAY 12/1/21   Rehan Freire MD   metoprolol tartrate (LOPRESSOR) 50 MG tablet Take 1 tablet by mouth 2 times daily 11/17/21   Rehan Freire, MD   MUCUS RELIEF 600 MG extended release tablet  11/8/21   Historical Provider, MD   ammonium lactate (LAC-HYDRIN) 12 % lotion Apply topically daily. 10/14/21   Rehan Freire, MD   ROCKLATAN 0.02-0.005 % SOLN  10/7/21   Historical Provider, MD   epoetin leslie-epbx (RETACRIT) 3000 UNIT/ML SOLN injection Inject 1 mL into the skin three times a week  Patient not taking: Reported on 9/6/2022 10/4/21   Nai Carbajal MD   Insulin Syringe-Needle U-100 31G X 5/16\" 1 ML MISC Use to subcutaneously inject insulin three times daily 9/22/21   Rehan Freire MD   docusate sodium (COLACE) 100 MG capsule Take 1 capsule by mouth 2 times daily For constipation 8/17/21   Rehan Freire MD   latanoprost (XALATAN) 0.005 % ophthalmic solution 1 drop nightly    Historical Provider, MD   clopidogrel (PLAVIX) 75 MG tablet Take 1 tablet by mouth daily 8/11/21   Rehan Freire MD   nitroGLYCERIN (NITROSTAT) 0.4 MG SL tablet DISSOLVE 1 TAB UNDER TONGUE FOR CHEST PAIN - IF PAIN REMAINS AFTER 5 MIN, CALL 911 AND REPEAT DOSE. MAX 3 TABS IN 15 MINUTES 8/2/21   Rehan Freire MD   clobetasol (TEMOVATE) 0.05 % ointment Apply topically 2 times daily for psoriasis 1/27/21   Rehan Freire MD   ketoconazole (NIZORAL) 2 % cream Apply twice a day for yeast infection in the skin folds, for 4 weeks 1/20/21   Rehan Freire MD   Lancets MISC Use to check blood sugar three times daily along with when necessary due to symptoms.  9/30/20   Kaia Alexander MD   vitamin B-12 (CYANOCOBALAMIN) 500 MCG tablet Take 1 tablet by mouth daily 7/13/20   Wendy Shell MD   Oxygen Tubing MISC by Does not apply route DX COPD. chronic respiratory failure 3/26/20   Wendy Shell MD   ONE TOUCH ULTRASOFT LANCETS MISC Patient to test blood sugar up to 4 times daily. 11/7/19   Derik Gomez MD   Lancet Devices (LANCING DEVICE) MISC Provide patient with lancing device appropriate for his machine/lancing needles. 6/4/19   Wendy Shell MD   Handicap Placard MISC by Does not apply route Can't walk greater than 200 feet. Expires in 5 years. 2/13/19   Paige Bryant MD   fluticasone (CUTIVATE) 0.05 % cream Apply topically 2 times daily  4/19/17   Historical Provider, MD   fluticasone (FLONASE) 50 MCG/ACT nasal spray 2 sprays by Nasal route daily  Patient taking differently: 2 sprays by Nasal route daily as needed (sinus symptoms)  1/16/17   Wendy Shell MD   Melatonin 10 MG TABS Take 10 mg by mouth nightly as needed (insomnia) 12/23/16   Wendy Shell MD   aspirin 81 MG EC tablet Take 81 mg by mouth daily.     Historical Provider, MD       Immunizations:   Most Recent Immunizations   Administered Date(s) Administered    COVID-19, MODERNA BLUE border, Primary or Immunocompromised, (age 12y+), IM, 100 mcg/0.5mL 12/15/2021    COVID-19, PFIZER GRAY top, DO NOT Dilute, (age 15 y+), IM, 30 mcg/0.3 mL 04/23/2022    DT (pediatric) 12/14/1998    Hepatitis B Adult (Engerix-B) 12/04/2019    Hepatitis B Adult (Recombivax HB) 12/04/2019    Influenza Virus Vaccine 10/12/2015    Influenza, FLUARIX, FLULAVAL, FLUZONE (age 10 mo+) AND AFLURIA, (age 1 y+), PF, 0.5mL 10/09/2020    Influenza, FLUCELVAX, (age 10 mo+), MDCK, PF, 0.5mL 10/14/2021    Pneumococcal Polysaccharide (Vmrxvisen75) 05/23/2013    Tdap (Boostrix, Adacel) 09/12/2018    Zoster Recombinant (Shingrix) 02/14/2022       Home Blood Glucose Results:   Per patient's Freestyle Lianne report below                             Patient's blood sugar is overall somewhat higher than prior but still having some hypoglycemia. Will make changes as discussed above to lower blood sugar durin the day and reduce hypoglycemia at night. Assessment     A1c at goal:No: 8 (6/8/22) POC at endocrinology office. Blood Pressure at goal: Yes  Weight at goal: No:    Physical activity: No  Physical activity at goal: No    Smoking Cessation: Yes    Cholesterol at target: Yes  Annual eye exam completed: Yes  Comprehensive Foot Exam Completed: Yes  Annual urine albumin and serum creatinine: Yes    Statin: Yes    Appropriate?: Yes  Changes made: refill provided    ACE/ARB: Yes  Appropriate?: Yes  Changes made:     Aspirin: Yes  Appropriate?: Yes  Changes made:     Eating patterns:    [x]  My plate    []  Mediterranean diet   []  Low sodium   []  DASH diet   [x]  Portion control   [x]  Reduced calorie    []  Fast food / Restaurant  []  High glycemic index foods   []  Sugary beverages   []  Other     Comment: see above    Current Medications Affecting Diabetes:  Humulin R 500    Compliant:No  Barriers to medication therapy: anxiety / depression    Medications attempted in the past:  Metformin - cardiologist took him off but patient unsure why  Januvia - PCP took him off but patient unsure why    Recent exacerbations / new problems:  Gout / Chest pain/pressure    Last office dictation reviewed: yes        type 2 DM under worsening control as evidenced by blood sugars on Rajani. Plan   Counseling at today's visit:     -Continued monitoring of blood glucose with use of Freestyle Rajani. Call with any issues with sensor. Bring data cord to each visit.      -Continue taking insulin on regular basis:  change dose to insulin at .65 at breakfast time, 50 at dinner time and .25 with evening snack. Patient is encouraged to not skip meals and to not take insulin if he is not going to eat. Call dermatologist for appointment.         Physician Follow-up:   Dr Sridhar Calix     Medication Management Follow-up:   Diabetes Service   2 months. (Will see endocrinologist in 4 weeks)    Electronically signed by Fauzia Sullivan [facility] on 9/6/2022 at 9:34 AM     For Pharmacy Admin Tracking Only    CPA in place:  Yes  Recommendation Provided To: Patient/Caregiver: 4 via In person  Intervention Detail: Dose Adjustment: 1, reason: Therapy Optimization, New Rx: 1, reason: Needs Additional Therapy, Refill(s) Provided and Scheduled Appointment  Intervention Accepted By: Patient/Caregiver: 4  Time Spent (min): 45     Yesenia Rizvi RPH,Pharm. D,, BCPS, CACP  9/6/2022  9:34 AM

## 2022-09-06 NOTE — PROGRESS NOTES
Pt here for CHF Clinic visit, pt short of breath, lungs diminished, O2 sat 98% 3 liters NC. Weight up 10.9 lbs since last visit. Pt states he saw Dr Sofi Toussaint recently and states he changed his Bumex to 1 mg, two tabs BID instead of 1 mg, three tabs BID. Pt states his NIV machine filter needs replaced and has contacted Bobby. And also states the machine alarms too much and he gets no sleep. Pt sleeps in a hospital bed. Pt also states the seal around his mask has been falling apart. Pt state that he has been told to shave his beard, but pt refuses. Pt states he was also told to go to the ER if breathing symptoms got worse or if having chest pain, pt verbalized that he would \"rather die at home. \" Pt encouraged to take meds as prescribed and to follow up with his cardiologist Dr Howard Martini. Pt states he has been watching his sodium intake and states he has recently been diagnosed with an umbilical hernia. BNP elevated today from 146 to 341, BMP stable for patient. Will call and fax labs and med list over to [address] [place] [address] office to address BNP and any medication changes. Next CHF Clinic appointment scheduled for Tues Oct 4 at 10:00. Lab orders faxed. Awaiting response. Verbally reviewed importance of medication compliance with patient; patient verbalized understanding. Discussed 2000mg/day sodium restricted diet; patient verbalized understanding. Moderate daily exercise encouraged as tolerated. Discussed rest breaks as needed; patient verbalized understanding. Patient instructed to weigh self at the same time of each day, using same clothes and same scale; reinforced teaching to monitor for 3-5 lb weight increase over 1-2 days, and to notify the CHF clinic at [phone] or physician office if weight change noted. Patient verbalized understanding. Risks of smoking discussed with the patient if applicable; patient strongly discouraged to smoke. Patient verbalized understanding.     Signs and symptoms of CHF discussed with patient, such as feeling more tired than normal, feeling short of breath, coughing that increases when you lie down, sudden weight gain, swelling of your feet, legs or belly. Patient verbalized understanding to notify the CHF clinic at [phone] or physician office if these symptoms occur. Compliance with plan of care and further disease process causes discussed with patient, patient encouraged to keep all follow up appointments. Patient verbalized understanding.

## 2022-09-07 NOTE — PROGRESS NOTES
On 9-7-22 writer faxed and reported labs, weight and assessment to [facility] at Dr Carrol Otto office. Informed [facility] to have Dr Flavio Whitfield address recent BNP and BMP and notify pt of medication changes. Pt updated and states he will await phone call from Dr Flavio Whitfield office.

## 2022-09-08 ENCOUNTER — TELEPHONE (OUTPATIENT)
Dept: FAMILY MEDICINE CLINIC | Age: 57
End: 2022-09-08

## 2022-09-08 NOTE — TELEPHONE ENCOUNTER
Patient requested appointment for FMLA renewal for his wife, we have not received the FMLA, he did not call to schedule either, we do have an appointment this morning due to a late cancellation yesterday, but due to his paperwork not being available we cannot  do it      Please inform patient we need those FMLA papers in and that I have put him on a wait list, so we can call him anytime we get a cancellation, for a video appointment if we have the paperwork available!

## 2022-09-08 NOTE — TELEPHONE ENCOUNTER
Noted FMLA forms received and scanned in the chart by Eris Rosa to the patient, I placed him on the wait list, paperwork is due 9/17/2022      Future Appointments   Date Time Provider William Givensi   10/4/2022 10:00 AM [facility] RM [address]   10/11/2022  9:30 AM Perri Hinds MD [facility]   10/11/2022 11:20 AM Joseline Gallardo MD Neuro [facility]   10/20/2022  8:30 AM CZ MEDICATION MG[facility] MED [facility]   10/26/2022 10:00 AM Romulo Espinal MD [facility]   11/1/2022 10:15 AM Ekta Knight MD [facility]   3/7/2023  9:00 AM Preri Hinds MD [facility]

## 2022-09-09 ENCOUNTER — TELEPHONE (OUTPATIENT)
Dept: FAMILY MEDICINE CLINIC | Age: 57
End: 2022-09-09

## 2022-09-09 ENCOUNTER — TELEMEDICINE (OUTPATIENT)
Dept: FAMILY MEDICINE CLINIC | Age: 57
End: 2022-09-09
Payer: COMMERCIAL

## 2022-09-09 DIAGNOSIS — I50.32 CHRONIC DIASTOLIC CONGESTIVE HEART FAILURE (HCC): Primary | ICD-10-CM

## 2022-09-09 DIAGNOSIS — J44.9 CHRONIC OBSTRUCTIVE PULMONARY DISEASE, UNSPECIFIED COPD TYPE (HCC): ICD-10-CM

## 2022-09-09 DIAGNOSIS — G89.29 CHRONIC MIDLINE LOW BACK PAIN WITH LEFT-SIDED SCIATICA: ICD-10-CM

## 2022-09-09 DIAGNOSIS — M54.42 CHRONIC MIDLINE LOW BACK PAIN WITH LEFT-SIDED SCIATICA: ICD-10-CM

## 2022-09-09 DIAGNOSIS — Z79.4 TYPE 2 DIABETES MELLITUS WITH DIABETIC POLYNEUROPATHY, WITH LONG-TERM CURRENT USE OF INSULIN (HCC): ICD-10-CM

## 2022-09-09 DIAGNOSIS — N18.30 BENIGN HYPERTENSIVE HEART AND CKD, STAGE 3 (GFR 30-59), W CHF (HCC): ICD-10-CM

## 2022-09-09 DIAGNOSIS — M48.061 SPINAL STENOSIS OF LUMBAR REGION WITHOUT NEUROGENIC CLAUDICATION: ICD-10-CM

## 2022-09-09 DIAGNOSIS — J96.11 CHRONIC RESPIRATORY FAILURE WITH HYPOXIA (HCC): ICD-10-CM

## 2022-09-09 DIAGNOSIS — I13.0 BENIGN HYPERTENSIVE HEART AND CKD, STAGE 3 (GFR 30-59), W CHF (HCC): ICD-10-CM

## 2022-09-09 DIAGNOSIS — E11.42 TYPE 2 DIABETES MELLITUS WITH DIABETIC POLYNEUROPATHY, WITH LONG-TERM CURRENT USE OF INSULIN (HCC): ICD-10-CM

## 2022-09-09 DIAGNOSIS — E78.5 HYPERLIPIDEMIA WITH TARGET LDL LESS THAN 70: ICD-10-CM

## 2022-09-09 PROCEDURE — 3017F COLORECTAL CA SCREEN DOC REV: CPT | Performed by: FAMILY MEDICINE

## 2022-09-09 PROCEDURE — 99214 OFFICE O/P EST MOD 30 MIN: CPT | Performed by: FAMILY MEDICINE

## 2022-09-09 PROCEDURE — G8427 DOCREV CUR MEDS BY ELIG CLIN: HCPCS | Performed by: FAMILY MEDICINE

## 2022-09-09 PROCEDURE — 3051F HG A1C>EQUAL 7.0%<8.0%: CPT | Performed by: FAMILY MEDICINE

## 2022-09-09 PROCEDURE — 2022F DILAT RTA XM EVC RTNOPTHY: CPT | Performed by: FAMILY MEDICINE

## 2022-09-09 ASSESSMENT — ENCOUNTER SYMPTOMS
ABDOMINAL DISTENTION: 0
WHEEZING: 1
SHORTNESS OF BREATH: 1
NAUSEA: 0
ABDOMINAL PAIN: 0
CONSTIPATION: 0
APNEA: 1
CHEST TIGHTNESS: 0
VOMITING: 0
COUGH: 1
BACK PAIN: 1
DIARRHEA: 0

## 2022-09-09 NOTE — PROGRESS NOTES
2022    TELEHEALTH EVALUATION -- Audio/Visual (During PEABB-28 public health emergency)      Samantha Blake (:  1965) is a 61 y.o. male,Established patient, here for evaluation of the following chief complaint(s): COPD, Congestive Heart Failure, Hypertension, and Diabetes        ASSESSMENT/PLAN:    1. Chronic diastolic congestive heart failure ([facility] [address]  worsening  Lab Results   Component Value Date    LVEF 46 2021    LVEFMODE Echo 2017     -     Brain Natriuretic Peptide; Future  -     Magnesium; Future  -     Phosphorus; Future  -     CBC with Auto Differential; Future  -     Uric Acid; Future  -     TSH; Future  -     Comprehensive Metabolic Panel; Future    Discussed low salt diet and BP and pulse monitoring. Continue current treatment Bumex 3 mg BID, Lisinopril 2.5 mg, metoprolol 50 mg BID  follow up with cardiology as scheduled  2. Benign hypertensive heart and CKD, stage 3 (GFR 30-59), w CHF ([facility] [address]    Stable Chronic kidney disease stage 3  Well controlled HTN. Continue current treatment. Bumex 3 mg BID, Lisinopril 2.5 mg, metoprolol 50 mg BID  Will recheck labs. -     Magnesium; Future  -     Phosphorus; Future  -     CBC with Auto Differential; Future  -     Uric Acid; Future  -     TSH; Future  -     Comprehensive Metabolic Panel; Future  3. Chronic obstructive pulmonary disease, unspecified COPD type (HCC)  Stable  Continue oxygen at 3 l/min  Continue Spiriva daily, Advair BID  Abstein from smoking  On noninvasive ventilator at nighttime    4. Type 2 diabetes mellitus with diabetic polyneuropathy, with long-term current use of insulin ([facility] [address]  Improving  follow up with Dr. Marbella Elizalde and clinical pharmacist    Lab Results   Component Value Date    LABA1C 7.8 (H) 2022    LABA1C 8.8 2022    LABA1C 8.8 2022       -     CBC with Auto Differential; Future  -     Vitamin B12 & Folate; Future  -     TSH; Future  -     Comprehensive Metabolic Panel;  Future  -advised home blood glucose testing multiple times daily, has Lianne  -daily feet exam, Foot care: advised to wash feet daily, pat dry and apply lotion at night, avoiding between toes. Need to look at feet daily and report to a physician any signs of inflammation or skin damage  -annual dilated eye exam  -Low carb, low fat diet, increase fruits and vegetables, and exercise 4-5 times a day 30-40 minutes a day discussed  -continue current treatment  -continue Aspirin 81 mg  -continue lisinopril ACEI and statin Crestor 10 mg    5. Chronic midline low back pain with left-sided sciatica  Stable, improved with meds, denies side effects  To continue gabapentin, tizanidine, repositioning, stretching, heating pad, oxycodone 10 Mg immediate release tablet 3 times a day as needed for pain. Patient already had 4 back surgeries, which did not resolve the pain. 6. Spinal stenosis of lumbar region without neurogenic claudication  Stable  Already had 4 back surgeries     7. Chronic respiratory failure with hypoxia (HCC)  Worsening  On oxygen continuously at 3 L/min  Recently he has changed a pulmonologist.  8. Hyperlipidemia with target LDL less than 70  Well-controlled  Continue [id number] Mg, and recheck lipid panel  Lab Results   Component Value Date    LDLCHOLESTEROL      2021    LDLDIRECT 60 2021       -     Lipid Panel; Future    Patient does have Narcan at home  Is not , the daughter confirms  Risks of opiate when having respiratory failure and COPD discussed including respiratory arrest and death, patient understands and he declines stopping the opiates    FMLA completed for his wife to help him with the multiple appointments, following with CHF clinic every month, neurologist, clinical pharmacist monthly, endocrinologist every 3 months, cardiology every 6 months, nephrology every 6 months, hematologist oncologist every 6 months, pulmonology every 2 to 3 months, recently established with Dr. Donte Mckeon.   Patient needs help with ADLs, emotional support, transportation, care coordination with appointment, and help with flareups of CHF and COPD. Controlled Substance Monitoring:    Acute and Chronic Pain Monitoring:   RX Monitoring 9/9/2022   Attestation -   Periodic Controlled Substance Monitoring Possible medication side effects, risk of tolerance/dependence & alternative treatments discussed. ;No signs of potential drug abuse or diversion identified. ;Assessed functional status. Chronic Pain > 50 MEDD -   Chronic Pain > 80 MEDD -           Miguel received counseling on the following healthy behaviors: nutrition, exercise, medication adherence, tobacco cessation, and weight loss    Reviewed prior labs and health maintenance  Discussed use, benefit, and side effects of prescribed medications. Barriers to medication compliance addressed. Patient given educational materials - see patient instructions  All patient questions answered. Patient voiced understanding. The patient's past medical,surgical, social, and family history as well as his current medications and allergies were reviewed as documented in today's encounter. Medications, labs, diagnostic studies, consultations and follow-up as documented in this encounter. Return in about 3 months (around 12/9/2022) for Visit type diabetes, **Please obtain A1c from DR. Anuja Mcmullen., COPD, CHF, DM2, HTN, HLP.     Please obtain A1c from Dr. Katlin Courtney completed I believe in the box, please fax it and scan it, and MyChart patient once completed    Future Appointments   Date Time Provider William Hernandez   10/4/2022 10:00 AM [facility] RM [address]   10/11/2022  9:30 AM Ron Fraser MD fp sc [facility]   10/11/2022 11:20 AM Estella Wynne MD Neuro Spec [facility]   10/20/2022  8:30 AM [facility] MEDICATION MGMT [facility] MED MGMT [facility]   10/26/2022 10:00 AM Vivek Currie MD [facility] RenalSrv [facility] Renal Se   11/1/2022 10:15 AM [facility]   3/7/2023  9:00 [facility]         SUBJECTIVE/OBJECTIVE:  Baldo Galvan. (:  1965) has requested an audio/video evaluation for the following concern(s):COPD, Congestive Heart Failure, Hypertension, and Diabetes    Patient is accompanied by his daughter, Goldie Blanco. He is here for renewal of his FMLA for his wife to help with his appointments, transportation, care during flareups, ADLs, emotional support    Hypertension chronic kidney disease stage III,Congestive heart failure:    he  is not exercising and is adherent to low salt diet. Blood pressure is well controlled at home. Cardiac symptoms dyspnea, fatigue, and orthopnea. Patient denies chest pain, chest pressure/discomfort, claudication, exertional chest pressure/discomfort, irregular heart beat, lower extremity edema, near-syncope, orthopnea, palpitations, paroxysmal nocturnal dyspnea, syncope, and tachypnea. Cardiovascular risk factors: advanced age (older than 54 for men, 72 for women), diabetes mellitus, dyslipidemia, hypertension, male gender, obesity (BMI >= 30 kg/m2), and smoking/ tobacco exposure. Use of agents associated with hypertension: none. History of target organ damage: angina/ prior myocardial infarction, chronic kidney disease, heart failure, and prior coronary revascularization.  Has 1 stent        blood pressure is Normal.    BP Readings from Last 3 Encounters:   22 128/70   22 126/84   22 (!) 138/108        Pulse is Normal.    Pulse Readings from Last 3 Encounters:   22 80   22 95   22 88       Weight is increased,  unintentional weight gain of 24 lbs in 6 months  Wt Readings from Last 3 Encounters:   22 (!) 437 lb 14.4 oz (198.6 kg)   22 (!) 427 lb (193.7 kg)   22 (!) 412 lb (186.9 kg)     Weight: (!) 413 lb (187.3 kg) on 3/2/2022     COPD:  Current treatment includes short-acting beta agonist inhaler, nebulized beta agonist, combined beta agonist/steroid inhaler, anticholinergic inhaler, home 02- 3 l/min, which has been somewhat effective. Has changed pulmonologist  He is now at 3 L/MIN  He says he Quit smoking again 3 weeks ago   Residual symptoms: chronic dyspnea, cough productive of yellow sputum in moderate amounts, and wheezing. He denies chest pain. Has been using Nebs 3 times a day  Still sleeps for 1-2 hrs  Not on CPAP,Has noninvasive ventilator which he uses 6 to 7 hours per night    Nicotine dependence. Counseling given: Yes  Tobacco comments: Quit smoking again 8/15/2022      Has chronic low back pain for many years, midline ,shooting down to the left side. Has known Spinal stenosis, degenerative changes, history of 4 back surgeries. Intensity of pain is 8/10 today  He says he is not due for Pain medication for 2 more hours. He is on oxycodone, muscle relaxant, also gabapentin for neuropathy from neurologist.  Patient reports about once a month he falls. He does need renewal of his FMLA due to his multiple comorbidities. Patient has wheelchair, walker, cane, shower chair, oxygen machine. At the last appointment in March I did refer him to physical therapy. Has Narcan at home, does not know how to use it. His daughter says it is not . We discussedCorrect use of Narcan. diabetes mellitus type 2  Home Blood Glucose 112, fluctuating   Has Lianne     A1c 8 per his recollection  Denies hypoglycemic events. He reports compliance with his medications, still drinking pop. Has been seeing both Dr. Polly Kinney and clinical pharmacist.    A1c improved  Lab Results   Component Value Date    LABA1C 7.8 (H) 2022    LABA1C 8.8 2022    LABA1C 8.8 2022     Hyperlipidemia:  No new myalgias or GI upset on rosuvastatin (Crestor). Medication compliance: compliant all of the time. Patient is  following a low fat, low cholesterol diet.     LDL is controlled, with high triglycerides   Lab Results Component Value Date    LDLCHOLESTEROL      09/12/2021    LDLDIRECT 60 09/12/2021     Lab Results   Component Value Date    TRIG 416 (H) 09/12/2021    TRIG 213 (H) 07/24/2021    TRIG 180 (H) 09/25/2020             Review of Systems   Constitutional:  Positive for fatigue and unexpected weight change. Negative for activity change, appetite change, chills, diaphoresis and fever. HENT:  Positive for hearing loss (hearing aids, improved). Eyes:  Positive for visual disturbance (stable, chronic). Respiratory:  Positive for apnea, cough, shortness of breath and wheezing (mild). Negative for chest tightness. On continuous oxygen at 3 l/min. On noninvasive ventilator 6-7 hrs/night, not on CPAP anymore   Cardiovascular:  Negative for chest pain, palpitations and leg swelling. Gastrointestinal:  Negative for abdominal distention, abdominal pain, constipation, diarrhea, nausea and vomiting. Endocrine: Negative for cold intolerance, heat intolerance, polydipsia, polyphagia and polyuria. Musculoskeletal:  Positive for arthralgias, back pain and gait problem. Has walker, cane, wheelchair when going to the appointments, one of his family members takes him to the appointments    Skin:  Negative for wound. All wounds are healed   Allergic/Immunologic: Positive for immunocompromised state (due to diabetes). Neurological:  Positive for numbness (feet, numbness and burning in his feet since 2000). Hematological:  Bruises/bleeds easily. Psychiatric/Behavioral:  Positive for sleep disturbance. Negative for dysphoric mood, self-injury and suicidal ideas. The patient is nervous/anxious. Prior to Visit Medications    Medication Sig Taking?  Authorizing Provider   vitamin D3 (CHOLECALCIFEROL) 25 MCG (1000 UT) TABS tablet TAKE ONE TABLET BY MOUTH DAILY Yes Ron Fraser MD   varenicline (CHANTIX) 1 MG tablet Take 1 mg by mouth 2 times daily Yes Historical Provider, MD   blood glucose monitor strips Test 3 times a day & as needed for symptoms of irregular blood glucose. Dispense sufficient amount for indicated testing frequency plus additional to accommodate PRN testing needs. One touch Ultra blue. To be used if Chew not working or to double check sugars. Yes Marie Magaña MD   amitriptyline (ELAVIL) 50 MG tablet TAKE ONE TABLET BY MOUTH EVERY NIGHT AT BEDTIME Yes Kirstin Angulo MD   oxyCODONE HCl (OXY-IR) 10 MG immediate release tablet Take 1 tablet by mouth every 8 hours as needed for Pain for up to 30 days. Yes Marie Magaña MD   Respiratory Therapy Supplies (NEBULIZER/TUBING/MOUTHPIECE) KIT Dx COPD needs nebulizer supplies Yes Marie Magaña MD   pantoprazole (PROTONIX) 40 MG tablet Take 1 tablet by mouth every morning (before breakfast) Yes Marie Magaña MD   insulin regular human (HUMULIN R) 500 UNIT/ML concentrated injection vial Patient using 0.60ml with breakfast, 0.50 ml with lunch and 0.25 ml with dinner. Patient taking differently: Patient using 0.65ml with breakfast, 0.50 ml with dinner and 0.25 ml with evening snack.  Yes Marie Magaña MD   Continuous Blood Gluc Sensor (FREESTYLE CRISTINE 14 DAY SENSOR) [facility] USE AS DIRECTED TO MONITOR BLOOD SUGAR AND CHANGE EVERY 14 DAYS Yes Marie Magaña MD   gabapentin (NEURONTIN) 300 MG capsule TAKE ONE CAPSULE BY MOUTH THREE TIMES A DAY Yes Kirstin Angulo MD   bumetanide (BUMEX) 1 MG tablet Take 3 tablets by mouth 2 times daily Dose increased 7/13/2022 Yes Marie Magaña MD   albuterol (PROVENTIL) (2.5 MG/3ML) 0.083% nebulizer solution USE THREE MILLILITERS ( ONE VIAL) VIA NEBULIZATION BY MOUTH EVERY 6 HOURS AS NEEDED FOR WHEEZING OR FOR SHORTNESS OF BREATH Yes Marie Magaña MD   tiZANidine (ZANAFLEX) 4 MG tablet TAKE ONE TABLET BY MOUTH EVERY 8 HOURS AS NEEDED FOR MUSCLE SPASMS Yes Marie Magaña MD   allopurinol (ZYLOPRIM) 300 MG tablet Take 1 tablet by mouth daily Dose increased 12/9/2021 . Stop if any rash develops Yes Isabela Young MD   albuterol sulfate  (90 Base) MCG/ACT inhaler INHALE TWO PUFFS BY MOUTH EVERY 6 HOURS AS NEEDED FOR WHEEZING OR FOR SHORTNESS OF BREATH Yes Isabela Young MD   lisinopril (PRINIVIL;ZESTRIL) 2.5 MG tablet Take 1 tablet by mouth daily Dose decreased 3/2/2022 Yes Isabela Young MD   rosuvastatin (CRESTOR) 10 MG tablet Take 1 tablet by mouth nightly Stop pravastatin Yes Isabela Young MD   nystatin (MYCOSTATIN) 989750 UNIT/GM powder Apply 2 times daily in the skin folds for long term.  Yes Isabela Young MD   chlorhexidine (HIBICLENS) 4 % external liquid Use once or twice a day to clean the left armpit, diluted in water and wash the left armpit with it Yes Isabela Young MD   venlafaxine (EFFEXOR XR) 150 MG extended release capsule Take 1 capsule by mouth every morning Dose increased 3/2/2022 Yes Isabela Young MD   naloxone 4 MG/0.1ML LIQD nasal spray 1 spray by Nasal route as needed for Opioid Reversal Patient needs counseling Yes Isabela Young MD   butalbital-acetaminophen-caffeine (FIORICET, ESGIC) -40 MG per tablet Take 1 tablet by mouth every 8 hours as needed for Headaches Yes Terrie Darby MD   MAGNESIUM-OXIDE 400 (241.3 Mg) MG TABS tablet Take 1 tablet by mouth 2 times daily Frequency increased to 10/20/2022 Yes Isabela Young MD   nystatin (MYCOSTATIN) 158706 UNIT/ML suspension Take 5 mLs by mouth 4 times daily Swish and swallow Yes Isabela Young MD   FEROSUL 325 (65 Fe) MG tablet TAKE ONE TABLET BY MOUTH DAILY Yes Isabela Young MD   SPIRIVA RESPIMAT 2.5 MCG/ACT AERS inhaler INHALE TWO PUFFS BY MOUTH DAILY Yes Isabela Young MD   ADVAIR -21 MCG/ACT inhaler INHALE Juan Yordan Yes Isabela Young MD   metoprolol tartrate (LOPRESSOR) 50 MG tablet Take 1 tablet by mouth 2 times daily Yes Isabela Young MD   MUCUS RELIEF 600 MG extended release tablet  Yes Historical Provider, MD   ammonium lactate (LAC-HYDRIN) 12 % lotion Apply topically daily. Yes Madison Hatchet, MD   ROCKLATAN 0.02-0.005 % SOLN  Yes Historical Provider, MD   epoetin leslie-epbx (RETACRIT) 3000 UNIT/ML SOLN injection Inject 1 mL into the skin three times a week Yes Wong Villavicencio MD   Insulin Syringe-Needle U-100 31G X 5/16\" 1 ML MISC Use to subcutaneously inject insulin three times daily Yes Madison Hatchet, MD   docusate sodium (COLACE) 100 MG capsule Take 1 capsule by mouth 2 times daily For constipation Yes Madison Hatchet, MD   latanoprost (XALATAN) 0.005 % ophthalmic solution 1 drop nightly Yes Historical Provider, MD   clopidogrel (PLAVIX) 75 MG tablet Take 1 tablet by mouth daily Yes Madison Hatchet, MD   nitroGLYCERIN (NITROSTAT) 0.4 MG SL tablet DISSOLVE 1 TAB UNDER TONGUE FOR CHEST PAIN - IF PAIN REMAINS AFTER 5 MIN, CALL 911 AND REPEAT DOSE. MAX 3 TABS IN 15 MINUTES Yes Paige Bryant MD   clobetasol (TEMOVATE) 0.05 % ointment Apply topically 2 times daily for psoriasis Yes Madison Hatchet, MD   ketoconazole (NIZORAL) 2 % cream Apply twice a day for yeast infection in the skin folds, for 4 weeks Yes Madison Hatchet, MD   Lancets MISC Use to check blood sugar three times daily along with when necessary due to symptoms. Yes Verona Miranda MD   vitamin B-12 (CYANOCOBALAMIN) 500 MCG tablet Take 1 tablet by mouth daily Yes Madison Hatchet, MD   Oxygen Tubing MISC by Does not apply route DX COPD. chronic respiratory failure Yes Madison Hatchet, MD   ONE TOUCH ULTRASOFT LANCETS MISC Patient to test blood sugar up to 4 times daily. Yes Verona Miranda MD   Lancet Devices (LANCING DEVICE) MISC Provide patient with lancing device appropriate for his machine/lancing needles. Yes Madison Hatchet, MD   Handicap Placard MISC by Does not apply route Can't walk greater than 200 feet. Expires in 5 years.  Yes Madison Hatchet, MD   fluticasone ([facility]) 0.05 % cream Apply topically 2 times daily  Yes Historical Provider, MD   fluticasone (FLONASE) 50 MCG/ACT nasal spray 2 sprays by Nasal route daily  Patient taking differently: 2 sprays by Nasal route daily as needed (sinus symptoms) Yes Paige Bryant MD   Melatonin 10 MG TABS Take 10 mg by mouth nightly as needed (insomnia) Yes Rodrigo Ch MD   aspirin 81 MG EC tablet Take 81 mg by mouth daily. Yes Historical Provider, MD       Social History     Tobacco Use    Smoking status: Former     Packs/day: 0.50     Years: 33.00     Pack years: 16.50     Types: Cigarettes     Start date: 1985     Quit date: 2020     Years since quittin.8    Smokeless tobacco: Former     Types: Snuff     Quit date: 1995    Tobacco comments:     Quit smoking again 8/15/2022   Vaping Use    Vaping Use: Never used   Substance Use Topics    Alcohol use: No     Alcohol/week: 0.0 standard drinks    Drug use: Yes     Types: Marijuana Naheed Koffi)     Comment: Patient reports he quit using THC for 26 days on 2021, using THC gummies as of 22          PHYSICAL EXAMINATION:    Vital Signs: (As obtained by patient/caregiver or practitioner observation)  -vital signs stable and within normal limits except Morbid obesity per BMI.  BMI 59.39 kg/m2  Patient-Reported Vitals 2022   Patient-Reported Weight 434 lbs   Patient-Reported Height 6 ft 1 in   Patient-Reported Systolic 4.10   Patient-Reported Diastolic -   Patient-Reported Pulse 82   Patient-Reported Temperature -   Patient-Reported SpO2 99% at 3 l/min           Intensity of pain is:810       Constitutional: [x] Appears well-developed and well-nourished [x] No apparent distress      [x] Abnormal- obese      Mental status  [x] Alert and awake  [x] Oriented to person/place/time [x]Able to follow commands      Eyes:  EOM    [x]  Normal  [] Abnormal-  Sclera  [x]  Normal  [] Abnormal -         Discharge [x]  None visible  [] Abnormal -    HENT:   [x] Normocephalic, 09/02/2022 09:31 AM    BUN 32 09/02/2022 09:31 AM    CREATININE 1.61 09/02/2022 09:31 AM    GLUCOSE 221 09/02/2022 09:31 AM    CALCIUM 9.3 09/02/2022 09:31 AM        Lab Results   Component Value Date    ALT 12 06/06/2022    AST 14 06/06/2022    ALKPHOS 90 06/06/2022    BILITOT 0.25 (L) 06/06/2022       Lab Results   Component Value Date    TSH 1.51 06/06/2022       Lab Results   Component Value Date    CHOL 137 09/12/2021    CHOL 121 07/24/2021    CHOL 151 09/25/2020     Lab Results   Component Value Date    TRIG 416 (H) 09/12/2021    TRIG 213 (H) 07/24/2021    TRIG 180 (H) 09/25/2020     Lab Results   Component Value Date    HDL 30 (L) 09/12/2021    HDL 32 (L) 07/24/2021    HDL 33 (L) 09/25/2020     Lab Results   Component Value Date    LDLCHOLESTEROL      09/12/2021    LDLCHOLESTEROL 46 07/24/2021    LDLCHOLESTEROL 82 09/25/2020       Lab Results   Component Value Date    CHOLHDLRATIO 4.6 09/12/2021    CHOLHDLRATIO 3.8 07/24/2021    CHOLHDLRATIO 4.6 09/25/2020       Lab Results   Component Value Date    LABA1C 7.8 (H) 06/06/2022    LABA1C 8.8 02/24/2022    LABA1C 8.8 02/24/2022         Lab Results   Component Value Date    IGTGZDOE73 1084 06/06/2022       Lab Results   Component Value Date    VITD25 38.7 02/28/2022         Lab Results   Component Value Date    IRON 44 (L) 06/06/2022    TIBC 301 06/06/2022    FERRITIN 88 06/06/2022       No orders of the defined types were placed in this encounter.       Orders Placed This Encounter   Procedures    Brain Natriuretic Peptide     Standing Status:   Future     Standing Expiration Date:   9/9/2023    Magnesium     Standing Status:   Future     Standing Expiration Date:   9/9/2023    Phosphorus     Standing Status:   Future     Standing Expiration Date:   9/9/2023    CBC with Auto Differential     Standing Status:   Future     Standing Expiration Date:   9/9/2023    Lipid Panel     Standing Status:   Future     Standing Expiration Date:   9/9/2023     Order Specific

## 2022-09-09 NOTE — TELEPHONE ENCOUNTER
I completed FMLA today during the appointment, I will place it in the box, to be faxed, please make a note when it is scanned, and [employer] message for patient when completed

## 2022-09-12 ENCOUNTER — PATIENT MESSAGE (OUTPATIENT)
Dept: FAMILY MEDICINE CLINIC | Age: 57
End: 2022-09-12

## 2022-09-12 DIAGNOSIS — L73.2 HIDRADENITIS SUPPURATIVA OF LEFT AXILLA: Primary | ICD-10-CM

## 2022-09-12 NOTE — PROGRESS NOTES
On 9-12-22 Spoke with Parish Franklin and he states he had not received any communications from Dr Ewa Forbes office regarding his recent BNP and weight increase. Writer called Dr Ewa Forbes office and spoke with Trinity Gonzalez. Informed her that pt has not heard back from Dr Ewa Forbes office. Trinity Gonzalez states she will notify Dr Ewa Forbes of pts BNP, BMP and weight to bring these to his attention. Writer notified pt and instructed pt to notify CHF Clinic with any updates.

## 2022-09-13 NOTE — TELEPHONE ENCOUNTER
From: Chastity Perez. To: Dr. Alecia Galindo: 9/12/2022 5:00 PM EDT  Subject: Referral     Can I get a referral to a dermatologist please.

## 2022-09-26 ENCOUNTER — HOSPITAL ENCOUNTER (OUTPATIENT)
Age: 57
Discharge: HOME | End: 2022-09-26
Payer: COMMERCIAL

## 2022-09-26 DIAGNOSIS — N18.30 BENIGN HYPERTENSIVE HEART AND CKD, STAGE 3 (GFR 30-59), W CHF (HCC): ICD-10-CM

## 2022-09-26 DIAGNOSIS — I12.9 BENIGN HYPERTENSION WITH CKD (CHRONIC KIDNEY DISEASE) STAGE III (HCC): ICD-10-CM

## 2022-09-26 DIAGNOSIS — I50.32 CHRONIC DIASTOLIC CONGESTIVE HEART FAILURE (HCC): ICD-10-CM

## 2022-09-26 DIAGNOSIS — N17.9 AKI (ACUTE KIDNEY INJURY) (HCC): Primary | ICD-10-CM

## 2022-09-26 DIAGNOSIS — Z79.4 TYPE 2 DIABETES MELLITUS WITH DIABETIC POLYNEUROPATHY, WITH LONG-TERM CURRENT USE OF INSULIN (HCC): ICD-10-CM

## 2022-09-26 DIAGNOSIS — N18.31 ANEMIA IN STAGE 3A CHRONIC KIDNEY DISEASE (HCC): ICD-10-CM

## 2022-09-26 DIAGNOSIS — E78.5 HYPERLIPIDEMIA WITH TARGET LDL LESS THAN 70: ICD-10-CM

## 2022-09-26 DIAGNOSIS — E11.42 TYPE 2 DIABETES MELLITUS WITH DIABETIC POLYNEUROPATHY, WITH LONG-TERM CURRENT USE OF INSULIN (HCC): ICD-10-CM

## 2022-09-26 DIAGNOSIS — N18.30 SECONDARY DIABETES MELLITUS WITH STAGE 3 CHRONIC KIDNEY DISEASE (HCC): ICD-10-CM

## 2022-09-26 DIAGNOSIS — I13.0 BENIGN HYPERTENSIVE HEART AND CKD, STAGE 3 (GFR 30-59), W CHF (HCC): ICD-10-CM

## 2022-09-26 DIAGNOSIS — N18.31 STAGE 3A CHRONIC KIDNEY DISEASE (HCC): ICD-10-CM

## 2022-09-26 DIAGNOSIS — M1A.30X0 CHRONIC GOUT DUE TO RENAL IMPAIRMENT WITHOUT TOPHUS, UNSPECIFIED SITE: Primary | ICD-10-CM

## 2022-09-26 DIAGNOSIS — E13.22 SECONDARY DIABETES MELLITUS WITH STAGE 3 CHRONIC KIDNEY DISEASE (HCC): ICD-10-CM

## 2022-09-26 DIAGNOSIS — N18.30 BENIGN HYPERTENSION WITH CKD (CHRONIC KIDNEY DISEASE) STAGE III (HCC): ICD-10-CM

## 2022-09-26 DIAGNOSIS — D63.1 ANEMIA IN STAGE 3A CHRONIC KIDNEY DISEASE (HCC): ICD-10-CM

## 2022-09-26 LAB
ABSOLUTE EOS #: 0.42 K/UL (ref 0–0.4)
ABSOLUTE LYMPH #: 1.17 K/UL (ref 1–4.8)
ABSOLUTE MONO #: 0.74 K/UL (ref 0.1–1.3)
ALBUMIN SERPL-MCNC: 4.4 G/DL (ref 3.5–5.2)
ALP BLD-CCNC: 101 U/L (ref 40–129)
ALT SERPL-CCNC: 11 U/L (ref 5–41)
ANION GAP SERPL CALCULATED.3IONS-SCNC: 14 MMOL/L (ref 9–17)
ANION GAP SERPL CALCULATED.3IONS-SCNC: 14 MMOL/L (ref 9–17)
AST SERPL-CCNC: 15 U/L
AVERAGE GLUCOSE: NORMAL
BASOPHILS # BLD: 1 % (ref 0–2)
BASOPHILS ABSOLUTE: 0.11 K/UL (ref 0–0.2)
BILIRUB SERPL-MCNC: 0.4 MG/DL (ref 0.3–1.2)
BUN BLDV-MCNC: 91 MG/DL (ref 6–20)
BUN BLDV-MCNC: 91 MG/DL (ref 6–20)
CALCIUM SERPL-MCNC: 10.2 MG/DL (ref 8.6–10.4)
CALCIUM SERPL-MCNC: 10.2 MG/DL (ref 8.6–10.4)
CHLORIDE BLD-SCNC: 90 MMOL/L (ref 98–107)
CHLORIDE BLD-SCNC: 91 MMOL/L (ref 98–107)
CHOLESTEROL/HDL RATIO: 4.6
CHOLESTEROL: 151 MG/DL
CO2: 30 MMOL/L (ref 20–31)
CO2: 31 MMOL/L (ref 20–31)
CREAT SERPL-MCNC: 2.23 MG/DL (ref 0.7–1.2)
CREAT SERPL-MCNC: 2.38 MG/DL (ref 0.7–1.2)
EOSINOPHILS RELATIVE PERCENT: 4 % (ref 0–4)
FOLATE: 15.1 NG/ML
GFR AFRICAN AMERICAN: 34 ML/MIN
GFR AFRICAN AMERICAN: 37 ML/MIN
GFR NON-AFRICAN AMERICAN: 28 ML/MIN
GFR NON-AFRICAN AMERICAN: 31 ML/MIN
GFR SERPL CREATININE-BSD FRML MDRD: ABNORMAL ML/MIN/{1.73_M2}
GFR SERPL CREATININE-BSD FRML MDRD: ABNORMAL ML/MIN/{1.73_M2}
GLUCOSE BLD-MCNC: 319 MG/DL (ref 70–99)
GLUCOSE BLD-MCNC: 323 MG/DL (ref 70–99)
HBA1C MFR BLD: 8.8 %
HCT VFR BLD CALC: 35.7 % (ref 41–53)
HDLC SERPL-MCNC: 33 MG/DL
HEMOGLOBIN: 11.3 G/DL (ref 13.5–17.5)
LDL CHOLESTEROL: 51 MG/DL (ref 0–130)
LYMPHOCYTES # BLD: 11 % (ref 24–44)
MAGNESIUM: 2 MG/DL (ref 1.6–2.6)
MAGNESIUM: 2 MG/DL (ref 1.6–2.6)
MCH RBC QN AUTO: 24.7 PG (ref 26–34)
MCHC RBC AUTO-ENTMCNC: 31.6 G/DL (ref 31–37)
MCV RBC AUTO: 78.2 FL (ref 80–100)
MONOCYTES # BLD: 7 % (ref 1–7)
MORPHOLOGY: ABNORMAL
PDW BLD-RTO: 18.7 % (ref 11.5–14.9)
PHOSPHORUS: 4.9 MG/DL (ref 2.5–4.5)
PHOSPHORUS: 4.9 MG/DL (ref 2.5–4.5)
PLATELET # BLD: 198 K/UL (ref 150–450)
PMV BLD AUTO: 8.4 FL (ref 6–12)
POTASSIUM SERPL-SCNC: 4.5 MMOL/L (ref 3.7–5.3)
POTASSIUM SERPL-SCNC: 4.5 MMOL/L (ref 3.7–5.3)
PRO-BNP: 86 PG/ML
PRO-BNP: 88 PG/ML
RBC # BLD: 4.56 M/UL (ref 4.5–5.9)
SEG NEUTROPHILS: 77 % (ref 36–66)
SEGMENTED NEUTROPHILS ABSOLUTE COUNT: 8.16 K/UL (ref 1.3–9.1)
SODIUM BLD-SCNC: 135 MMOL/L (ref 135–144)
SODIUM BLD-SCNC: 135 MMOL/L (ref 135–144)
TOTAL PROTEIN: 8.1 G/DL (ref 6.4–8.3)
TRIGL SERPL-MCNC: 336 MG/DL
TSH SERPL DL<=0.05 MIU/L-ACNC: 1.78 UIU/ML (ref 0.3–5)
URIC ACID: 9.3 MG/DL (ref 3.4–7)
VITAMIN B-12: 1313 PG/ML (ref 232–1245)
WBC # BLD: 10.6 K/UL (ref 3.5–11)

## 2022-09-26 PROCEDURE — 82607 VITAMIN B-12: CPT

## 2022-09-26 PROCEDURE — 84100 ASSAY OF PHOSPHORUS: CPT

## 2022-09-26 PROCEDURE — 80053 COMPREHEN METABOLIC PANEL: CPT

## 2022-09-26 PROCEDURE — 83880 ASSAY OF NATRIURETIC PEPTIDE: CPT

## 2022-09-26 PROCEDURE — 85025 COMPLETE CBC W/AUTO DIFF WBC: CPT

## 2022-09-26 PROCEDURE — 82746 ASSAY OF FOLIC ACID SERUM: CPT

## 2022-09-26 PROCEDURE — 83735 ASSAY OF MAGNESIUM: CPT

## 2022-09-26 PROCEDURE — 84443 ASSAY THYROID STIM HORMONE: CPT

## 2022-09-26 PROCEDURE — 80061 LIPID PANEL: CPT

## 2022-09-26 PROCEDURE — 84550 ASSAY OF BLOOD/URIC ACID: CPT

## 2022-09-26 RX ORDER — BUMETANIDE 1 MG/1
1 TABLET ORAL 2 TIMES DAILY
Qty: 540 TABLET | Refills: 0
Start: 2022-09-26

## 2022-09-26 RX ORDER — ALLOPURINOL 300 MG/1
300 TABLET ORAL DAILY
Qty: 90 TABLET | Refills: 3 | Status: SHIPPED | OUTPATIENT
Start: 2022-09-26

## 2022-09-26 NOTE — RESULT ENCOUNTER NOTE
Please notify patient: Uric acid is higher than before, she is drinking pop most likely, could also be related to his diuretic treatment for CHF that he needs. I will send allopurinol to the pharmacy for gout otherwise he will get gout in his joints, uric acid is very high and worsening. Anemia stable that he is dehydrated and the kidney function got worse, to stop Bumex completely for 2 days also completely to stop lisinopril 2.5.   Blood work to every day not fasting, I placed 7 orders but will go day by day  Will restart Bumex day third, on Wednesday 1 mg twice a day, and we will go from there, we might need to increase it back to at least 2 mg twice a day (but I think 3 mg twice a day was too much for his kidneys)    I did send him a message [employer] but you need to make sure he verbalizes understanding due to critical results    Orders in the computer placed for daily blood workx7 days  Future Appointments  10/4/2022  10:00 AM   [facility]F [facility]  [id number]  10/6/2022  1:15 PM    Deandre Krueger MD          derm             [facility]  10/11/2022 9:30 AM    Giovana Aldridge MD     fp sc               Rian Rao  10/11/2022 11:20 AM   Reji Luque MD     Neuro Spec          [facility]  10/20/2022 8:30 AM    [facility] MEDICATION MGMT       [facility] MED MGMT        [facility]  10/26/2022 10:00 AM   Hortencia Jones MD   [facility] RenalSrv        [facility] Renal Se  11/1/2022  10:15 AM   Denita Alonso MD     [facility]        [facility]  3/7/2023   9:00 AM    [facility] Aldridge [facility]     [facility]

## 2022-09-26 NOTE — RESULT ENCOUNTER NOTE
Please notify patient:  Sharply worsening kidney function  Needs to stop Bumex for 2 days, also stop lisinopril 2.5 mg, to write down instructions    Restart Bumex the third day only 1 mg twice a day, absolutely avoid salt, recheck blood work tomorrow, will adjust as we go  Tomorrow, the day after tomorrow, and Wednesday, 3 days in a row, orders placed    If he feels sick, to go to the emergency room due to worsening kidney function, otherwise will manage at home by decreasing the diuretics          Future Appointments  10/4/2022  10:00 AM   [facility] CHF [facility]IN[facility]  [address]  10/6/2022  1:15 PM    Amber Betancourt MD          derm             [facility]  10/11/2022 9:30 AM    [facility]urling, [facility]     [facility]  10/11/2022 11:20 AM   Williams Valero MD     Neuro Spec          [facility]  10/20/2022 8:30 AM    CZ MEDICATION MGMT       [facility] MED MGMT        [facility]  10/26/2022 10:00 AM   Peri Boast, MD   [facility] RenalSrv        [facility]FL Renal Se  11/1/2022  10:15 AM   Janet Melvin MD     [facility]        [facility]  3/7/2023   9:00 AM    Cherri Spurling, MD     [facility]               Elizabeth Rutherford

## 2022-09-26 NOTE — RESULT ENCOUNTER NOTE
Please notify patient: Sharply worsening kidney function  Needs to stop Bumex for 2 days, also stop lisinopril 2.5 mg, to write down instructions    Restart Bumex the third day only 1 mg twice a day, absolutely avoid salt, recheck blood work tomorrow  Tomorrow, the day after tomorrow, and Wednesday, 3 days in a row, orders placed    If he feels sick, to go to the emergency room due to worsening kidney function, otherwise will manage at home by decreasing the diuretics      Future Appointments  10/4/2022  10:00 AM   [facility] CHF CLINIC  [address]  10/6/2022  1:15 PM    Princess Gutierrez MD          derm             [facility]  10/11/2022 9:30 AM    MD nani Espinoza [facility]  10/11/2022 11:20 AM   Jorge Rhodes MD     Neuro Spec          [facility]  10/20/2022 8:30 AM    [facility] MEDICATION MGMT       [facility] MED MGMT        [facility]  10/26/2022 10:00 AM   Payton Arteaga MD   [facility] RenalSrv        AFL Renal Se  11/1/2022  10:15 AM   Bro Ruff MD     [facility]        [facility]  3/7/2023   9:00 AM    Governor Jay MD     fp [facility] Pro

## 2022-09-26 NOTE — RESULT ENCOUNTER NOTE
Uric acid is higher than before, she is drinking pop most likely, could also be related to his diuretic treatment for CHF that he needs.  I will send allopurinol to the pharmacy for gout otherwise he will get gout in his joints, uric acid is very high and worsening. Anemia stable that he is dehydrated and the kidney function got worse, to stop Bumex completely for 2 days also completely to stop lisinopril 2.5.   Blood work to every day not fasting, I placed 7 orders but will go day by day  Will restart Bumex day third, on Wednesday 1 mg twice a day, and we will go from there, we might need to increase it back to at least 2 mg twice a day (but I think 3 mg twice a day was too much for his kidneys)     I did send him a message [employer] but you need to make sure he verbalizes understanding due to critical results     Orders in the computer placed for daily blood workx7 days

## 2022-09-27 ENCOUNTER — HOSPITAL ENCOUNTER (OUTPATIENT)
Age: 57
Discharge: HOME | End: 2022-09-27
Payer: COMMERCIAL

## 2022-09-27 DIAGNOSIS — N17.9 AKI (ACUTE KIDNEY INJURY) (HCC): ICD-10-CM

## 2022-09-27 DIAGNOSIS — I13.0 BENIGN HYPERTENSIVE HEART AND CKD, STAGE 3 (GFR 30-59), W CHF (HCC): ICD-10-CM

## 2022-09-27 DIAGNOSIS — N18.30 BENIGN HYPERTENSIVE HEART AND CKD, STAGE 3 (GFR 30-59), W CHF (HCC): ICD-10-CM

## 2022-09-27 DIAGNOSIS — I50.32 CHRONIC DIASTOLIC CONGESTIVE HEART FAILURE (HCC): ICD-10-CM

## 2022-09-27 LAB
ANION GAP SERPL CALCULATED.3IONS-SCNC: 13 MMOL/L (ref 9–17)
BUN BLDV-MCNC: 76 MG/DL (ref 6–20)
CALCIUM SERPL-MCNC: 10 MG/DL (ref 8.6–10.4)
CHLORIDE BLD-SCNC: 94 MMOL/L (ref 98–107)
CO2: 28 MMOL/L (ref 20–31)
CREAT SERPL-MCNC: 1.87 MG/DL (ref 0.7–1.2)
GFR AFRICAN AMERICAN: 45 ML/MIN
GFR NON-AFRICAN AMERICAN: 37 ML/MIN
GFR SERPL CREATININE-BSD FRML MDRD: ABNORMAL ML/MIN/{1.73_M2}
GLUCOSE BLD-MCNC: 320 MG/DL (ref 70–99)
MAGNESIUM: 2.3 MG/DL (ref 1.6–2.6)
POTASSIUM SERPL-SCNC: 4.9 MMOL/L (ref 3.7–5.3)
PRO-BNP: 99 PG/ML
SODIUM BLD-SCNC: 135 MMOL/L (ref 135–144)

## 2022-09-27 PROCEDURE — 83880 ASSAY OF NATRIURETIC PEPTIDE: CPT

## 2022-09-27 PROCEDURE — 36415 COLL VENOUS BLD VENIPUNCTURE: CPT

## 2022-09-27 PROCEDURE — 80048 BASIC METABOLIC PNL TOTAL CA: CPT

## 2022-09-27 PROCEDURE — 83735 ASSAY OF MAGNESIUM: CPT

## 2022-09-27 NOTE — RESULT ENCOUNTER NOTE
Please notify patient: Very high blood glucose, to cut down sweets and especially pop  Improving kidney function  Restart diuretics tomorrow and repeat blood work tomorrow as well  Again to absolutely stop lisinopril.   To resume tomorrow Bumex 1 mg twice a day        Future Appointments  10/4/2022  10:00 AM   [facility] CHF CLINIC [address]  10/6/2022  1:15 PM    Shannon Bartholomew MD         [facility]  10/11/2022 9:30 AM    Stephanie Bain MD     [facility]  10/11/2022 11:20 AM   Werner Edwards MD     Neuro Spec          [facility]  10/20/2022 8:30 AM    STCZ MEDICATION MGMT       [facility] MED MGMT        [facility]  10/26/2022 10:00 AM   Dejah Rowland MD   AFL RenalSrv        AFL Renal Se  11/1/2022  10:15 AM   Kaya Locke MD     [facility]        [facility]  3/7/2023   9:00 AM    Stephanie Bain MD     [facility]               Christopher Wolf

## 2022-09-28 ENCOUNTER — HOSPITAL ENCOUNTER (OUTPATIENT)
Age: 57
Discharge: HOME | End: 2022-09-28
Payer: COMMERCIAL

## 2022-09-28 DIAGNOSIS — I50.32 CHRONIC DIASTOLIC CONGESTIVE HEART FAILURE (HCC): ICD-10-CM

## 2022-09-28 DIAGNOSIS — I13.0 BENIGN HYPERTENSIVE HEART AND CKD, STAGE 3 (GFR 30-59), W CHF (HCC): ICD-10-CM

## 2022-09-28 DIAGNOSIS — N18.30 BENIGN HYPERTENSIVE HEART AND CKD, STAGE 3 (GFR 30-59), W CHF (HCC): ICD-10-CM

## 2022-09-28 DIAGNOSIS — N17.9 AKI (ACUTE KIDNEY INJURY) (HCC): ICD-10-CM

## 2022-09-28 DIAGNOSIS — I50.32 CHRONIC DIASTOLIC CONGESTIVE HEART FAILURE (HCC): Primary | ICD-10-CM

## 2022-09-28 LAB
ANION GAP SERPL CALCULATED.3IONS-SCNC: 9 MMOL/L (ref 9–17)
BUN BLDV-MCNC: 59 MG/DL (ref 6–20)
CALCIUM SERPL-MCNC: 10 MG/DL (ref 8.6–10.4)
CHLORIDE BLD-SCNC: 98 MMOL/L (ref 98–107)
CO2: 29 MMOL/L (ref 20–31)
CREAT SERPL-MCNC: 1.62 MG/DL (ref 0.7–1.2)
GFR AFRICAN AMERICAN: 53 ML/MIN
GFR NON-AFRICAN AMERICAN: 44 ML/MIN
GFR SERPL CREATININE-BSD FRML MDRD: ABNORMAL ML/MIN/{1.73_M2}
GLUCOSE BLD-MCNC: 244 MG/DL (ref 70–99)
MAGNESIUM: 2.1 MG/DL (ref 1.6–2.6)
POTASSIUM SERPL-SCNC: 4.6 MMOL/L (ref 3.7–5.3)
PRO-BNP: 154 PG/ML
SODIUM BLD-SCNC: 136 MMOL/L (ref 135–144)

## 2022-09-28 PROCEDURE — 83735 ASSAY OF MAGNESIUM: CPT

## 2022-09-28 PROCEDURE — 36415 COLL VENOUS BLD VENIPUNCTURE: CPT

## 2022-09-28 PROCEDURE — 83880 ASSAY OF NATRIURETIC PEPTIDE: CPT

## 2022-09-28 PROCEDURE — 80048 BASIC METABOLIC PNL TOTAL CA: CPT

## 2022-09-28 NOTE — RESULT ENCOUNTER NOTE
Please notify patient: Improving blood glucose was still high, to stop drinking any pop, can drink sparkling water instead  Kidney function improving   BNP is pending    Did he see Dr. Michell Hernandez recently?   He is also due for an A1c    Lab Results       Component                Value               Date                       LABA1C                   7.8 (H)             06/06/2022                 LABA1C                   8.8                 02/24/2022                 LABA1C                   8.8                 02/24/2022                Future Appointments  10/4/2022  10:00 AM   [facility] CHF CLINIC  [address]  10/6/2022  1:15 PM    Ender Fuller MD          derm             [facility]  10/11/2022 9:30 AM    Ted Hernandez MD     [facility]  10/11/2022 11:20 AM   Little Gomez MD     Neuro Spec          [facility]  10/20/2022 8:30 AM    ST[facility] MEDICATION MGMT       [facility] MED MGMT        [facility]  10/26/2022 10:00 AM   [facility]        AFL Renal Se  11/1/2022  10:15 AM   Cassius Cordero MD     [facility]        [facility]  3/7/2023   9:00 AM    Ted Hernandez MD     [facility]               [address]

## 2022-09-28 NOTE — RESULT ENCOUNTER NOTE
Please notify patient:   Please notify patient: Improving blood glucose was still high, to stop drinking any pop, can drink sparkling water instead  Kidney function improving   BNP which is the marker for congestive heart failure is still good, he is not yet retaining water, restart Bumex 1 mg twice a day, recheck labs in 2 days      Future Appointments  10/4/2022  10:00 AM   [facility] CHF CLINIC [address]  10/6/2022  1:15 PM    Milagros Guzman MD         [facility]             [facility]  10/11/2022 9:30 AM    Jordy Pineda MD     fp [facility]  10/11/2022 11:20 AM   Sarah Beth Chandler MD     Neuro Spec          [facility]  10/20/2022 8:30 AM    STCZ MEDICATION MGMT       [facility] MED MGMT        [facility]  10/26/2022 10:00 AM   Eddie Faulkner MD   AFL RenalSrv        AFL Renal Se  11/1/2022  10:15 AM   Lainey Lowery MD     [facility]        [facility]  3/7/2023   9:00 AM    Jordy Pineda MD     fp [facility] [place]

## 2022-09-29 DIAGNOSIS — M51.36 DDD (DEGENERATIVE DISC DISEASE), LUMBAR: ICD-10-CM

## 2022-09-29 DIAGNOSIS — M96.1 POSTLAMINECTOMY SYNDROME OF LUMBAR REGION: ICD-10-CM

## 2022-09-29 DIAGNOSIS — M48.061 SPINAL STENOSIS OF LUMBAR REGION WITHOUT NEUROGENIC CLAUDICATION: ICD-10-CM

## 2022-09-29 DIAGNOSIS — M54.42 CHRONIC MIDLINE LOW BACK PAIN WITH LEFT-SIDED SCIATICA: ICD-10-CM

## 2022-09-29 DIAGNOSIS — G89.29 CHRONIC MIDLINE LOW BACK PAIN WITH LEFT-SIDED SCIATICA: ICD-10-CM

## 2022-09-29 RX ORDER — OXYCODONE HYDROCHLORIDE 10 MG/1
10 TABLET ORAL EVERY 8 HOURS PRN
Qty: 90 TABLET | Refills: 0 | Status: SHIPPED | OUTPATIENT
Start: 2022-09-29 | End: 2022-10-27 | Stop reason: SDUPTHER

## 2022-10-01 ENCOUNTER — HOSPITAL ENCOUNTER (OUTPATIENT)
Age: 57
Discharge: HOME | End: 2022-10-01
Payer: COMMERCIAL

## 2022-10-01 DIAGNOSIS — I50.32 CHRONIC DIASTOLIC CONGESTIVE HEART FAILURE (HCC): ICD-10-CM

## 2022-10-01 DIAGNOSIS — N18.30 BENIGN HYPERTENSIVE HEART AND CKD, STAGE 3 (GFR 30-59), W CHF (HCC): Primary | ICD-10-CM

## 2022-10-01 DIAGNOSIS — N18.30 BENIGN HYPERTENSIVE HEART AND CKD, STAGE 3 (GFR 30-59), W CHF (HCC): ICD-10-CM

## 2022-10-01 DIAGNOSIS — I13.0 BENIGN HYPERTENSIVE HEART AND CKD, STAGE 3 (GFR 30-59), W CHF (HCC): Primary | ICD-10-CM

## 2022-10-01 DIAGNOSIS — N17.9 AKI (ACUTE KIDNEY INJURY) (HCC): ICD-10-CM

## 2022-10-01 DIAGNOSIS — I13.0 BENIGN HYPERTENSIVE HEART AND CKD, STAGE 3 (GFR 30-59), W CHF (HCC): ICD-10-CM

## 2022-10-01 LAB
ANION GAP SERPL CALCULATED.3IONS-SCNC: 11 MMOL/L (ref 9–17)
BUN BLDV-MCNC: 49 MG/DL (ref 6–20)
CALCIUM SERPL-MCNC: 9.9 MG/DL (ref 8.6–10.4)
CHLORIDE BLD-SCNC: 97 MMOL/L (ref 98–107)
CO2: 30 MMOL/L (ref 20–31)
CREAT SERPL-MCNC: 1.77 MG/DL (ref 0.7–1.2)
GFR AFRICAN AMERICAN: 48 ML/MIN
GFR NON-AFRICAN AMERICAN: 40 ML/MIN
GFR SERPL CREATININE-BSD FRML MDRD: ABNORMAL ML/MIN/{1.73_M2}
GLUCOSE BLD-MCNC: 112 MG/DL (ref 70–99)
MAGNESIUM: 1.8 MG/DL (ref 1.6–2.6)
POTASSIUM SERPL-SCNC: 3.8 MMOL/L (ref 3.7–5.3)
PRO-BNP: 195 PG/ML
SODIUM BLD-SCNC: 138 MMOL/L (ref 135–144)

## 2022-10-01 PROCEDURE — 83880 ASSAY OF NATRIURETIC PEPTIDE: CPT

## 2022-10-01 PROCEDURE — 36415 COLL VENOUS BLD VENIPUNCTURE: CPT

## 2022-10-01 PROCEDURE — 83735 ASSAY OF MAGNESIUM: CPT

## 2022-10-01 PROCEDURE — 80048 BASIC METABOLIC PNL TOTAL CA: CPT

## 2022-10-01 NOTE — RESULT ENCOUNTER NOTE
Please notify patient: stable Chronic kidney disease stage 3  Not retaining water, BNP is still normal  Continue Bumex 1 mg twice a day only  and rest of meds as they are  Recheck labs in 2 weeks      Future Appointments  10/4/2022  10:00 AM   [facility] CHF CLINIC  [address]  10/6/2022  1:15 PM    Tomás Heredia MD         [facility] derm             [facility]  10/11/2022 9:30 AM    Gucci Mojica MD     fp [facility]  10/11/2022 11:20 AM   Kobe Beyer MD     Neuro Spec          [facility]  10/20/2022 8:30 AM    STCZ MEDICATION MGMT       [facility] MED MGMT        [facility]  10/26/2022 10:00 AM   Dixon Morse MD   [facility]FL RenalSrv        AFL Renal Se  11/1/2022  10:15 AM   Madhav Crespo MD     [facility]        [facility]  3/7/2023   9:00 AM    Gucci Mojica MD     fp [facility]

## 2022-10-04 ENCOUNTER — TELEPHONE (OUTPATIENT)
Dept: FAMILY MEDICINE CLINIC | Age: 57
End: 2022-10-04

## 2022-10-04 ENCOUNTER — E-VISIT (OUTPATIENT)
Dept: FAMILY MEDICINE CLINIC | Age: 57
End: 2022-10-04

## 2022-10-04 ENCOUNTER — HOSPITAL ENCOUNTER (OUTPATIENT)
Dept: OTHER | Age: 57
Discharge: HOME | End: 2022-10-04

## 2022-10-04 DIAGNOSIS — J44.1 COPD EXACERBATION (HCC): ICD-10-CM

## 2022-10-04 DIAGNOSIS — J20.9 ACUTE BRONCHITIS DUE TO INFECTION: Primary | ICD-10-CM

## 2022-10-04 PROCEDURE — 99423 OL DIG E/M SVC 21+ MIN: CPT | Performed by: FAMILY MEDICINE

## 2022-10-04 RX ORDER — AZITHROMYCIN 200 MG/5ML
POWDER, FOR SUSPENSION ORAL
Qty: 37.5 ML | Refills: 0 | Status: SHIPPED | OUTPATIENT
Start: 2022-10-04 | End: 2022-10-19 | Stop reason: ALTCHOICE

## 2022-10-04 RX ORDER — PREDNISOLONE 15 MG/5ML
30 SOLUTION ORAL DAILY
Qty: 70 ML | Refills: 0 | Status: SHIPPED | OUTPATIENT
Start: 2022-10-04 | End: 2022-10-11

## 2022-10-04 RX ORDER — GUAIFENESIN AND DEXTROMETHORPHAN HYDROBROMIDE 100; 10 MG/5ML; MG/5ML
10 SOLUTION ORAL EVERY 4 HOURS PRN
Qty: 120 ML | Refills: 0 | Status: SHIPPED | OUTPATIENT
Start: 2022-10-04 | End: 2022-10-19 | Stop reason: ALTCHOICE

## 2022-10-04 RX ORDER — COVID-19 MOLECULAR TEST ASSAY
KIT MISCELLANEOUS
Qty: 1 KIT | Refills: 3 | Status: SHIPPED | OUTPATIENT
Start: 2022-10-04 | End: 2022-10-19 | Stop reason: ALTCHOICE

## 2022-10-04 ASSESSMENT — LIFESTYLE VARIABLES
SMOKING_STATUS: YES
SMOKING_YEARS: 33

## 2022-10-04 NOTE — TELEPHONE ENCOUNTER
Called pt in regard to him requesting to r/s appt today due to daughter having surgery today. Daughter called back very rude, and unpleasant, I did try to explain to her, pt already has an appt 10/11 would she like to keep that  visit and follow up. She yelled at and cut me off while I was talking. She stated that patient cannot breathe , due to mucus and cough, as I attempted to advise her that an e-visit will be same day, and provider will be able to call in medications. She cut me off and began yelling, that she has already done that and the response is for her to call our office. Than refused to go back and resubmit. I than, attempted to explain to her how to re submit the e-visit step by step. She than began yelling at me on the phone. I than told her I will send her provider a message. Phone line hung up.

## 2022-10-04 NOTE — PROGRESS NOTES
Pt had previously called to cancel CHF Clinic appointment for today. Phoned pt and left message on pts phone with return phone number for pt to call us back to reschedule CHF Clinic appointment.

## 2022-10-04 NOTE — PROGRESS NOTES
HPI: per patient's questionnaire& patient was having a virtual appointment at lunchtime. I had a cancellation and I called the patient, to move it now earlier at 9:45 -10 AM.    I spoke with his daughter, Agata Munoz is saying she will not be home at lunchtime to be with him, because she has scheduled the surgery, and he cannot talk right now because he was eating breakfast and does not have a voice. Agata Munoz says she is next to patient. Agata Munoz says he only needs something for his mucus. I confirmed with her that he only needs an E-visit and she said yes. Again patient declined to be evaluated at this time or at lunchtime by myself, I offer my services and I did express that I am here to help him, the daughter was very upset and in a rush because someone nurse was calling her for her surgery. He declined to be seen earlier, meaning now when I am calling them, they canceled the appointment at lunchtime as well, due to her surgery  I advised if worsening shortness of breath to call 911 or to take him to emergency room. Agata Munoz says he will not go because he is a DNR. Portal message from the patient yesterday 10/3/2022  Kathy Brooke.  to Me      10:00 AM  Can I get something called in I am coughing up a lot of mucus and it is making my chest hurt from all the coughing and I am having a hard time swallowing pills     EXAM: not applicable    Diagnoses and all orders for this visit:    1. Acute bronchitis due to infection  Worsening  - Dextromethorphan-guaiFENesin  MG/5ML SYRP; Take 10 mLs by mouth every 4 hours as needed for Cough  Dispense: 120 mL; Refill: 0  - azithromycin (ZITHROMAX) 200 MG/5ML suspension; Take by mouth daily 500 mg/day on day one; 250 mg/day on days two through five  Dispense: 37.5 mL; Refill: 0  - prednisoLONE 15 MG/5ML solution; Take 10 mLs by mouth daily for 7 days  Dispense: 70 mL; Refill: 0  - nystatin (MYCOSTATIN) 285476 UNIT/ML suspension;  Take 5 mLs by mouth 4 times daily Swish and swallow  Dispense: 240 mL; Refill: 0  - COVID-19 Test (ID NOW COVID-19) KIT; Patient needs to self test at home  Dispense: 1 kit; Refill: 3    2. COPD exacerbation (HCC)  Worsening  To continue with nebulizer 4 times a day, Spiriva daily, Advair 2 puffs twice a day  - Dextromethorphan-guaiFENesin  MG/5ML SYRP; Take 10 mLs by mouth every 4 hours as needed for Cough  Dispense: 120 mL; Refill: 0  - azithromycin (ZITHROMAX) 200 MG/5ML suspension; Take by mouth daily 500 mg/day on day one; 250 mg/day on days two through five  Dispense: 37.5 mL; Refill: 0  - prednisoLONE 15 MG/5ML solution; Take 10 mLs by mouth daily for 7 days  Dispense: 70 mL; Refill: 0  - COVID-19 Test (ID NOW COVID-19) KIT; Patient needs to self test at home  Dispense: 1 kit; Refill: 3    No orders of the defined types were placed in this encounter. Patient was advised to contact PCP if symptoms worsen or failing to change as expected        More than 21 minutes were spent on the digital evaluation and management of this patient.   Electronically signed by Elle Lamb MD on 10/4/22 at  10:15 AM

## 2022-10-04 NOTE — TELEPHONE ENCOUNTER
Noted. Thank you!     I also spoke personally with the daughter who is next to the patient, they declined earlier visit between 945 and 10 AM when I called, they wanted me to only do an E-visit    I did advise them to call 911 or go to the emergency room if symptoms get worse  Future Appointments   Date Time Provider William Givensi   10/4/2022  9:30 PM Aftab Burrell MD [facility]   10/11/2022  9:30 AM Aftab Burrell MD [facility]   10/11/2022 11:20 AM Candelario Phillips MD Neuro Spec [facility]   10/20/2022  8:30 AM [facility] MEDICATION MGMT [facility]   10/26/2022 10:00 AM Nicholas Zuñiga MD [facility] RenalSrv [facility] Renal Se   11/1/2022 10:15 AM Thalia Mason MD [facility]   3/7/2023  9:00 AM Aftab Burrell MD [facility]

## 2022-10-04 NOTE — TELEPHONE ENCOUNTER
Patient daughter Desiree Eddy called in to reschedule his apt today for a different day, stated they can not do the video visit today because she has a procedure at that time, you have nothing soon available before his next apt which is 10/11/22, I encouraged Desiree Eddy that patient should go to an er or urgent care if he can not attend the video visit today or wait until his next apt, Desiree Eddy stated the patient is refusing to go to either places, stated if he catches pneumonia its going to be this clinic fault and hung the phone up

## 2022-10-10 DIAGNOSIS — K59.01 SLOW TRANSIT CONSTIPATION: ICD-10-CM

## 2022-10-10 RX ORDER — DOCUSATE SODIUM 100 MG/1
100 CAPSULE, LIQUID FILLED ORAL 2 TIMES DAILY
Qty: 180 CAPSULE | Refills: 3 | Status: SHIPPED | OUTPATIENT
Start: 2022-10-10

## 2022-10-11 ENCOUNTER — OFFICE VISIT (OUTPATIENT)
Dept: FAMILY MEDICINE CLINIC | Age: 57
End: 2022-10-11
Payer: COMMERCIAL

## 2022-10-11 VITALS
SYSTOLIC BLOOD PRESSURE: 130 MMHG | HEIGHT: 72 IN | WEIGHT: 315 LBS | BODY MASS INDEX: 42.66 KG/M2 | OXYGEN SATURATION: 97 % | DIASTOLIC BLOOD PRESSURE: 80 MMHG | TEMPERATURE: 97.9 F | HEART RATE: 90 BPM

## 2022-10-11 DIAGNOSIS — Z79.4 TYPE 2 DIABETES MELLITUS WITH DIABETIC POLYNEUROPATHY, WITH LONG-TERM CURRENT USE OF INSULIN (HCC): Primary | ICD-10-CM

## 2022-10-11 DIAGNOSIS — J96.11 CHRONIC RESPIRATORY FAILURE WITH HYPOXIA (HCC): ICD-10-CM

## 2022-10-11 DIAGNOSIS — E66.01 MORBID OBESITY WITH BMI OF 50.0-59.9, ADULT (HCC): ICD-10-CM

## 2022-10-11 DIAGNOSIS — E11.42 TYPE 2 DIABETES MELLITUS WITH DIABETIC POLYNEUROPATHY, WITH LONG-TERM CURRENT USE OF INSULIN (HCC): Primary | ICD-10-CM

## 2022-10-11 DIAGNOSIS — I13.0 BENIGN HYPERTENSIVE HEART AND CKD, STAGE 3 (GFR 30-59), W CHF (HCC): ICD-10-CM

## 2022-10-11 DIAGNOSIS — I50.32 CHRONIC DIASTOLIC CONGESTIVE HEART FAILURE (HCC): ICD-10-CM

## 2022-10-11 DIAGNOSIS — M48.061 SPINAL STENOSIS OF LUMBAR REGION WITHOUT NEUROGENIC CLAUDICATION: ICD-10-CM

## 2022-10-11 DIAGNOSIS — J44.9 CHRONIC OBSTRUCTIVE PULMONARY DISEASE, UNSPECIFIED COPD TYPE (HCC): ICD-10-CM

## 2022-10-11 DIAGNOSIS — N18.30 BENIGN HYPERTENSIVE HEART AND CKD, STAGE 3 (GFR 30-59), W CHF (HCC): ICD-10-CM

## 2022-10-11 DIAGNOSIS — Z12.5 PROSTATE CANCER SCREENING: ICD-10-CM

## 2022-10-11 DIAGNOSIS — E55.9 VITAMIN D DEFICIENCY: ICD-10-CM

## 2022-10-11 DIAGNOSIS — Z23 ENCOUNTER FOR IMMUNIZATION: ICD-10-CM

## 2022-10-11 PROCEDURE — 3017F COLORECTAL CA SCREEN DOC REV: CPT | Performed by: FAMILY MEDICINE

## 2022-10-11 PROCEDURE — 2022F DILAT RTA XM EVC RTNOPTHY: CPT | Performed by: FAMILY MEDICINE

## 2022-10-11 PROCEDURE — G8482 FLU IMMUNIZE ORDER/ADMIN: HCPCS | Performed by: FAMILY MEDICINE

## 2022-10-11 PROCEDURE — 3052F HG A1C>EQUAL 8.0%<EQUAL 9.0%: CPT | Performed by: FAMILY MEDICINE

## 2022-10-11 PROCEDURE — 99214 OFFICE O/P EST MOD 30 MIN: CPT | Performed by: FAMILY MEDICINE

## 2022-10-11 PROCEDURE — G8427 DOCREV CUR MEDS BY ELIG CLIN: HCPCS | Performed by: FAMILY MEDICINE

## 2022-10-11 PROCEDURE — G0008 ADMIN INFLUENZA VIRUS VAC: HCPCS | Performed by: FAMILY MEDICINE

## 2022-10-11 PROCEDURE — G8417 CALC BMI ABV UP PARAM F/U: HCPCS | Performed by: FAMILY MEDICINE

## 2022-10-11 PROCEDURE — 3023F SPIROM DOC REV: CPT | Performed by: FAMILY MEDICINE

## 2022-10-11 PROCEDURE — 90674 CCIIV4 VAC NO PRSV 0.5 ML IM: CPT | Performed by: FAMILY MEDICINE

## 2022-10-11 PROCEDURE — 1036F TOBACCO NON-USER: CPT | Performed by: FAMILY MEDICINE

## 2022-10-11 RX ORDER — BNT162B2 0.23 MG/2.25ML
0.3 INJECTION, SUSPENSION INTRAMUSCULAR ONCE
Qty: 0.3 ML | Refills: 0 | Status: SHIPPED | OUTPATIENT
Start: 2022-10-11 | End: 2022-10-11

## 2022-10-11 ASSESSMENT — ENCOUNTER SYMPTOMS
SHORTNESS OF BREATH: 1
NAUSEA: 0
WHEEZING: 0
VOMITING: 0
BACK PAIN: 1
DIARRHEA: 0
ABDOMINAL PAIN: 0
COUGH: 1
ABDOMINAL DISTENTION: 0
CONSTIPATION: 0
CHEST TIGHTNESS: 0

## 2022-10-11 NOTE — PROGRESS NOTES
Parth Goodson. (:  1965) is a 61 y.o. male,Established patient, here for evaluation of the following chief complaint(s): Hypertension, Congestive Heart Failure, COPD, Hyperlipidemia, and Diabetes (Requested his a1c from [facility] via fax )      ASSESSMENT/PLAN:    1. Type 2 diabetes mellitus with diabetic polyneuropathy, with long-term current use of insulin (HCC)  worsening    Lab Results   Component Value Date    LABA1C 8.8 2022    LABA1C 7.8 (H) 2022    LABA1C 8.8 2022    LABA1C 8.8 2022       -      DIABETES FOOT EXAM  -     CBC; Future  -     Comprehensive Metabolic Panel; Future  -     Vitamin B12 & Folate; Future  -advised home blood glucose testing 4-8 times daily with floresita  -daily feet exam, Foot care: advised to wash feet daily, pat dry and apply lotion at night, avoiding between toes. Need to look at feet daily and report to a physician any signs of inflammation or skin damage  -annual dilated eye exam  -Low carb, low fat diet, increase fruits and vegetables, and exercise 4-5 times a day 30-40 minutes a day discussed  -continue current treatment  -continue Aspirin   -continue statin Crestor      2. Benign hypertensive heart and CKD, stage 3 (GFR 30-59), w CHF ([facility] [address]  Stable Chronic kidney disease stage 3  Well controlled HTN  Continue current treatment. Will recheck labs. -     CBC; Future  -     Comprehensive Metabolic Panel; Future  -     Uric Acid; Future  -     Magnesium; Future  -     Phosphorus; Future  3. Chronic respiratory failure with hypoxia (HCC)  worsening  Continue oxygen at 3 L/min abstain from smoking and noninvasive ventilator at nighttime per Dr. Katrina Foote  4.  Chronic obstructive pulmonary disease, unspecified COPD type ([facility] [address]  worsening  Continue oxygen at 3 L/min continuously  Continue Spiriva daily, Advair 2 puffs twice a day rinse mouth after use, albuterol nebulizer treatments every 6 hours as needed  Absolutely abstain from smoking, he understands, he recently quit smoking again after another relapse    5. Chronic diastolic congestive heart failure ([facility] [address]  worsening  Lab Results   Component Value Date    LVEF 46 03/26/2021    LVEFMODE Echo 08/29/2017     -     Brain Natriuretic Peptide; Future  Follow-up with cardiologist and CHF clinic  Will do his blood work  Continue Bumex 1 Mg twice a day, metolazone 2.5 Mg daily, potassium 20 M EQ 2 times a day, metoprolol 50 Mg twice a day  He could not tolerate ACE inhibitor due to acute kidney injury    6. Spinal stenosis of lumbar region without neurogenic claudication  Likely worsening due to wear and tear nature of the disease. Continue pain control oxycodone 10 Mg every 8 hours as needed, stretching, repositioning, heating pad  He declines physical therapy at home, but is stretching his own    7. Vitamin D deficiency  Borderline low per prior levels, but very low before  Will recheck level  Continue supplementation.    -     Vitamin D 25 Hydroxy; Future  8. Encounter for immunization  -     Influenza, FLUCELVAX, (age 10 mo+), IM, PF, 0.5 mL  -     COVID-19 mRNA Vac-Juan David,Pfizer, (PFIZER-BIONT COVID-19 VAC-JUAN DAVID) 30 MCG/0.3ML SUSP injection; Inject 0.3 mLs into the muscle once for 1 dose DUE FOR BOOSTER, PLEASE LET PT KNOW WHEN READY, Disp-0.3 mL, R-0Normal  9. Prostate cancer screening  -     PSA Screening; Future  The natural history of prostate cancer and ongoing controversy regarding screening and potential treatment outcomes of prostate cancer has been discussed with the patient. The meaning of a false positive PSA and a false negative PSA has been discussed. He indicates understanding of the limitations of this screening test and wishes to proceed with screening PSA testing.     10. Morbid obesity with BMI of 50.0-59.9, adult (HCC)  Worsening  Low-carb diet, sitting exercises and walking more at home with a walker advised    Controlled Substance Monitoring:    Acute and Chronic Pain Monitoring:   RX Monitoring 10/11/2022   Attestation -   Periodic Controlled Substance Monitoring Possible medication side effects, risk of tolerance/dependence & alternative treatments discussed. ;No signs of potential drug abuse or diversion identified. ;Assessed functional status. Chronic Pain > 50 MEDD -   Chronic Pain > 80 MEDD -         Miguel received counseling on the following healthy behaviors: nutrition, exercise, medication adherence, tobacco cessation, and weight loss    Reviewed prior labs and health maintenance  Discussed use, benefit, and side effects of prescribed medications. Barriers to medication compliance addressed. Patient given educational materials - see patient instructions  Was a self-tracking handout given in paper form or via [employer]? Yes  All patient questions answered. Patient voiced understanding. The patient's past medical,surgical, social, and family history as well as his current medications and allergies were reviewed as documented in today's encounter. Medications, labs, diagnostic studies, consultations and follow-up as documented in this encounter. Return in about 4 months (around 2/11/2023) for KEEP APPT. Data Unavailable    Future Appointments   Date Time Provider William Hernandez   10/11/2022 11:20 AM Billie Watt MD Neuro Spec [email or web address]   10/18/2022 10:00 AM [facility] CHF CLINIC RM [address]   10/20/2022  8:30 AM [facility] MEDICATION MGMT [facility] MED [facility]MT [facility]   11/1/2022 10:15 AM Jasmina Lin MD [facility]   11/1/2022  3:30 PM Desirae Alvarenga MD [facility]   3/7/2023  9:00 [facility]         SUBJECTIVE/OBJECTIVE:      Comes with his daughter Theta Sires  He says he feels better today      Diabetes Mellitus Type II, Follow-up:    Current symptoms/problems include hyperglycemia, paresthesia of the feet, and visual disturbances. Symptoms have been present for several years.     Known diabetic complications: nephropathy, peripheral neuropathy, impotence, and cardiovascular disease  Cardiovascular risk factors: advanced age (older than 54 for men, 72 for women), diabetes mellitus, dyslipidemia, hypertension, male gender, obesity (BMI >= 30 kg/m2), sedentary lifestyle, smoking/ tobacco exposure, and chronic kidney disease stage III    Medication compliance:  compliant all of the time  Current diabetic medications include : Humulin R 500    Eye exam current (within one year): yes  Current diet: in general, a \"healthy\" diet      Barriers: impairment:  physical: chronic pain, difficulty walking , mental health: depression and anxiety, and substance abuse: THC, and overwhelmed by complexity of regimen \" nothing gets fixed\"    Current monitoring regimen: home blood tests - 4-8 times daily [facility] blood sugar records: trend: fluctuating a lot 119-500's last week when on steroids, mostly mid 100's-low 200's. Goes to clinical pharmacy  Any episodes of hypoglycemia? yes - last week when sick, but not this week    Is He on ACE inhibitor or angiotensin II receptor blocker? No      reports that he quit smoking about 23 months ago. His smoking use included cigarettes. He started smoking about 37 years ago. He has a 16.50 pack-year smoking history. He quit smokeless tobacco use about 27 years ago. His smokeless tobacco use included snuff. Counseling given: Yes  Tobacco comments: Quit smoking again 8/15/2022      Daily Aspirin? Yes      A1c is  8.8, worsening    Lab Results   Component Value Date    LABA1C 8.8 09/26/2022    LABA1C 7.8 (H) 06/06/2022    LABA1C 8.8 02/24/2022    LABA1C 8.8 02/24/2022     Fluctuating weight. Overall there is unintentional weight gain of 34 pounds in 1 year.   Morbid obesity  Wt Readings from Last 3 Encounters:   10/11/22 (!) 436 lb (197.8 kg)   09/06/22 (!) 437 lb 14.4 oz (198.6 kg)   08/09/22 (!) 427 lb (193.7 kg)     10/14/21 (!) 402 lb 14.1 oz (182.7 kg)         Hypertension, congestive heart failure, chronic kidney disease stage III, coronary artery disease with 1 stent:   Miguel  is not  exercising and is adherent to low salt diet. Blood pressure is well controlled at home. Cardiac symptoms  dyspnea, fatigue, lower extremity edema, and orthopnea. Patient denies  chest pain, chest pressure/discomfort, claudication, exertional chest pressure/discomfort, irregular heart beat, near-syncope, palpitations, paroxysmal nocturnal dyspnea, syncope, and tachypnea. Use of agents associated with hypertension: none. History of target organ damage: angina/ prior myocardial infarction, chronic kidney disease, heart failure, and prior coronary revascularization. Has 1 stent  His daughter says they have appointment with Dr. Peter Bradshaw  Will see CHF clinic as well. BP controlled. Miguel reports compliance with BP medications, and tolerates them well, denies side effects. BP Readings from Last 3 Encounters:   10/11/22 130/80   09/06/22 128/70   08/09/22 126/84        Pulse is Normal.    Pulse Readings from Last 3 Encounters:   10/11/22 90   09/06/22 80   08/09/22 95     Lab Results   Component Value Date    LVEF 46 03/26/2021    LVEFMODE Echo 08/29/2017               Patient has chronic low back pain for many years, midline, radiates to the sides, mostly towards the left, impairing his walking sometimes. He has history of 4 back surgeries but did not improve his back pain. Intensity of pain is 7/10. Taking pain medication, tolerated well, denies side effects patient says without the pain medication, goes up to 9 out of 10. Also using stretching, repositioning and heating pad  Pain is worse with activities, he cannot bend over, his family members have been helping him to get dressed. Has been using his wheelchair more often. He declines home physical therapy, but has been trying to walk inside of the house.   I reviewed with patient the medications and side effects, his daughter confirms that have Narcan in the house and it is not , the daughter confirms that she knows how to use it, advised if at any point he stops breathing to use the Narcan and call 911  The daughter verbalizes understanding  He uses walker, wheelchair, and he also has shower bench. X-ray 10/8/2018 degenerative changes, osteophytes, mild disc base narrowing L4-L5  MRI 2015 showed multilevel protruding disc disease in the lower lumbar spine causing spinal stenosis  COPD and chronic respiratory failure on 3 l/min:  Current treatment includes short-acting beta agonist inhaler, nebulized beta agonist, combined beta agonist/steroid inhaler, anticholinergic inhaler, home 02- on 3 l.min, , which has been somewhat effective. Dr. Lawrence Woodard took him off CPAP  He is now on noninvasive ventilator     Residual symptoms: chronic dyspnea and cough productive of sputum in small amounts  \"yellow and brown\"  He denies decreased exercise tolerance, purulent sputum, wheezing, chest tightness, chest pain, fever, purulent nasal discharge. He requires his rescue inhaler 3 time(s) per day    Nicotine dependence. Smoker, counseling given to quit smoking. Relapsed again, but Quit smoking 1 month ago on Chantix  Counseling given: Yes  Tobacco comments: Quit smoking again 8/15/2022          Depression \"is the same\"    He wants the PSA checked  Lab Results   Component Value Date    PSA 0.69 2021    PSA 1.09 2019    PSA 0.72 10/13/2015       Miguel has Vitamin D deficiency. Miguel  is  taking Vitamin D supplementation   he feels tired. Prior to Visit Medications    Medication Sig Taking?  Authorizing Provider   docusate sodium (COLACE) 100 MG capsule Take 1 capsule by mouth 2 times daily For constipation Yes Efra Franklin MD   prednisoLONE 15 MG/5ML solution Take 10 mLs by mouth daily for 7 days  Efra Franklin MD   nystatin (MYCOSTATIN) 620575 UNIT/ML suspension Take 5 mLs by mouth 4 times daily Swish and swallow Yes Mary Leone MD   oxyCODONE HCl (OXY-IR) 10 MG immediate release tablet Take 1 tablet by mouth every 8 hours as needed for Pain for up to 30 days. Yes Mary Leone MD   bumetanide (BUMEX) 1 MG tablet Take 1 tablet by mouth 2 times daily Dose decreased 9/26/2022 due to acute kidney injury Yes Mary Leone MD   allopurinol (ZYLOPRIM) 300 MG tablet Take 1 tablet by mouth daily For gout, stop medication if any rash develops Yes Mary Leone MD   metOLazone (ZAROXOLYN) 2.5 MG tablet Take 1 tablet by mouth daily Take 30 mins before am Bumex dose Yes Mirtha Narvaez MD   potassium chloride (KLOR-CON M) 20 MEQ extended release tablet Take 1 tablet by mouth 2 times daily Yes Mirtha Narvaez MD   vitamin D3 (CHOLECALCIFEROL) 25 MCG (1000 UT) TABS tablet TAKE ONE TABLET BY MOUTH DAILY Yes Mary Leone MD   varenicline (CHANTIX) 1 MG tablet Take 1 mg by mouth 2 times daily Yes Stewart Henriquez MD   blood glucose monitor strips Test 3 times a day & as needed for symptoms of irregular blood glucose. Dispense sufficient amount for indicated testing frequency plus additional to accommodate PRN testing needs. One touch Ultra blue. To be used if Jeevan not working or to double check sugars. Yes Mary Leone MD   amitriptyline (ELAVIL) 50 MG tablet TAKE ONE TABLET BY MOUTH EVERY NIGHT AT BEDTIME Yes Melany Paniagua MD   Respiratory Therapy Supplies (NEBULIZER/TUBING/MOUTHPIECE) KIT Dx COPD needs nebulizer supplies Yes Mary Leone MD   pantoprazole (PROTONIX) 40 MG tablet Take 1 tablet by mouth every morning (before breakfast) Yes Mary Leone MD   insulin regular human (HUMULIN R) 500 UNIT/ML concentrated injection vial Patient using 0.60ml with breakfast, 0.50 ml with lunch and 0.25 ml with dinner. Patient taking differently: Patient using 0.65ml with breakfast, 0.50 ml with dinner and 0.25 ml with evening snack.  Yes Paige MD Manny   Continuous Blood Gluc Sensor (FREESTYLE CRISTINE 14 DAY SENSOR) [facility] [place] USE AS DIRECTED TO MONITOR BLOOD SUGAR AND CHANGE EVERY 14 DAYS Yes Bernabe Muñoz MD   gabapentin (NEURONTIN) 300 MG capsule TAKE ONE CAPSULE BY MOUTH THREE TIMES A DAY Yes Melania Haney MD   albuterol (PROVENTIL) (2.5 MG/3ML) 0.083% nebulizer solution USE THREE MILLILITERS ( ONE VIAL) VIA NEBULIZATION BY MOUTH EVERY 6 HOURS AS NEEDED FOR WHEEZING OR FOR SHORTNESS OF BREATH Yes Bernabe Muñoz MD   tiZANidine (ZANAFLEX) 4 MG tablet TAKE ONE TABLET BY MOUTH EVERY 8 HOURS AS NEEDED FOR MUSCLE SPASMS Yes Bernabe Muñoz MD   albuterol sulfate  (90 Base) MCG/ACT inhaler INHALE TWO PUFFS BY MOUTH EVERY 6 HOURS AS NEEDED FOR WHEEZING OR FOR SHORTNESS OF BREATH Yes Bernabe Muñoz MD   rosuvastatin (CRESTOR) 10 MG tablet Take 1 tablet by mouth nightly Stop pravastatin Yes Bernabe Muñoz MD   nystatin (MYCOSTATIN) 663664 UNIT/GM powder Apply 2 times daily in the skin folds for long term.  Yes Bernabe Muñoz MD   chlorhexidine (HIBICLENS) 4 % external liquid Use once or twice a day to clean the left armpit, diluted in water and wash the left armpit with it Yes Bernabe Muñoz MD   venlafaxine (EFFEXOR XR) 150 MG extended release capsule Take 1 capsule by mouth every morning Dose increased 3/2/2022 Yes Bernabe Muñoz MD   naloxone 4 MG/0.1ML LIQD nasal spray 1 spray by Nasal route as needed for Opioid Reversal Patient needs counseling Yes Bernabe Muñoz MD   butalbital-acetaminophen-caffeine (FIORICET, ESGIC) -40 MG per tablet Take 1 tablet by mouth every 8 hours as needed for Headaches Yes Melania Haney MD   MAGNESIUM-OXIDE 400 (241.3 Mg) MG TABS tablet Take 1 tablet by mouth 2 times daily Frequency increased to 10/20/2022 Yes Bernabe Muñoz MD   FEROSUL 325 (65 Fe) MG tablet TAKE ONE TABLET BY MOUTH DAILY Yes Bernabe Muñoz MD   SPIRIVA RESPIMAT 2.5 MCG/ACT AERS inhaler INHALE TWO PUFFS BY MOUTH DAILY Yes Fausto Deluca MD   ADVAIR -21 MCG/ACT inhaler INHALE TWO PUFFS BY MOUTH TWICE A DAY Yes Fausto Deluca MD   metoprolol tartrate (LOPRESSOR) 50 MG tablet Take 1 tablet by mouth 2 times daily Yes Fausto Deluca MD   MUCUS RELIEF 600 MG extended release tablet  Yes Historical Provider, MD   ammonium lactate (LAC-HYDRIN) 12 % lotion Apply topically daily. Yes Fausto Deluca MD   ROCKLATAN 0.02-0.005 % SOLN  Yes Historical Provider, MD   epoetin leslie-epbx (RETACRIT) 3000 UNIT/ML SOLN injection Inject 1 mL into the skin three times a week Yes Caroline Norman MD   Insulin Syringe-Needle U-100 31G X 5/16\" 1 ML MISC Use to subcutaneously inject insulin three times daily Yes Fausto Deluca MD   latanoprost (XALATAN) 0.005 % ophthalmic solution 1 drop nightly Yes Historical Provider, MD   clopidogrel (PLAVIX) 75 MG tablet Take 1 tablet by mouth daily Yes Fausto Deluca MD   nitroGLYCERIN (NITROSTAT) 0.4 MG SL tablet DISSOLVE 1 TAB UNDER TONGUE FOR CHEST PAIN - IF PAIN REMAINS AFTER 5 MIN, CALL 911 AND REPEAT DOSE. MAX 3 TABS IN 15 MINUTES Yes Paige Bryant MD   clobetasol (TEMOVATE) 0.05 % ointment Apply topically 2 times daily for psoriasis Yes Fausto Deluca MD   ketoconazole (NIZORAL) 2 % cream Apply twice a day for yeast infection in the skin folds, for 4 weeks Yes Fausto Deluca MD   Lancets MISC Use to check blood sugar three times daily along with when necessary due to symptoms. Yes Yazmin Colón MD   vitamin B-12 (CYANOCOBALAMIN) 500 MCG tablet Take 1 tablet by mouth daily Yes Fausto Deluca MD   Oxygen Tubing MISC by Does not apply route DX COPD. chronic respiratory failure Yes Fausto Deluca MD   ONE TOUCH ULTRASOFT LANCETS MISC Patient to test blood sugar up to 4 times daily. Yes Yazmin Colón MD   Lancet Devices (LANCING DEVICE) MISC Provide patient with lancing device appropriate for his machine/lancing needles.  Yes Bettie Lowery MD   Wyatt Willard MISC by Does not apply route Can't walk greater than 200 feet. Expires in 5 years. Yes Bettie Lowery MD   fluticasone (CUTIVATE) 0.05 % cream Apply topically 2 times daily  Yes Historical Provider, MD   fluticasone (FLONASE) 50 MCG/ACT nasal spray 2 sprays by Nasal route daily  Patient taking differently: 2 sprays by Nasal route daily as needed (sinus symptoms) Yes Paige Bryant MD   Melatonin 10 MG TABS Take 10 mg by mouth nightly as needed (insomnia) Yes Bettie Lowery MD   aspirin 81 MG EC tablet Take 81 mg by mouth daily. Yes Historical Provider, MD   Dextromethorphan-guaiFENesin  MG/5ML SYRP Take 10 mLs by mouth every 4 hours as needed for Cough  Patient not taking: Reported on 10/11/2022  Bettie Lowery MD   azithromycin (ZITHROMAX) 200 MG/5ML suspension Take by mouth daily 500 mg/day on day one; 250 mg/day on days two through five  Patient not taking: Reported on 10/11/2022  Bettie Lowery MD   COVID-19 Test (ID NOW COVID-19) KIT Patient needs to self test at home  Patient not taking: Reported on 10/11/2022  Bettie Lowery MD            Social History     Tobacco Use    Smoking status: Former     Packs/day: 0.50     Years: 33.00     Pack years: 16.50     Types: Cigarettes     Start date: 1985     Quit date: 2020     Years since quittin.9    Smokeless tobacco: Former     Types: Snuff     Quit date: 1995    Tobacco comments:     Quit smoking again 8/15/2022   Vaping Use    Vaping Use: Never used   Substance Use Topics    Alcohol use: No     Alcohol/week: 0.0 standard drinks    Drug use: Yes     Types: Marijuana Boris Silva)     Comment: Patient reports he quit using THC for 26 days on 2021, using THC gummies as of 22         Review of Systems   Constitutional:  Positive for fatigue and unexpected weight change. Negative for activity change, appetite change, chills, diaphoresis and fever.    HENT:  Positive for movements intact. Conjunctiva/sclera: Conjunctivae normal.   Neck:      Thyroid: No thyromegaly. Cardiovascular:      Rate and Rhythm: Normal rate and regular rhythm. Pulses:           Dorsalis pedis pulses are 2+ on the right side and 2+ on the left side. Posterior tibial pulses are 2+ on the right side and 2+ on the left side. Heart sounds: Heart sounds are distant. No murmur heard. Comments: Significant stasis dermatitis , but no cellulitis  Pulmonary:      Effort: Pulmonary effort is normal. No respiratory distress. Breath sounds: Examination of the right-middle field reveals decreased breath sounds. Examination of the left-middle field reveals decreased breath sounds. Examination of the right-lower field reveals decreased breath sounds. Examination of the left-lower field reveals decreased breath sounds. Decreased breath sounds present. No wheezing or rales. Comments: On continuous oxygen at 3 l/min per nasal cannula  Chest:      Chest wall: No tenderness. Abdominal:      General: Bowel sounds are normal. There is no distension. Palpations: Abdomen is soft. There is no hepatomegaly or splenomegaly. Tenderness: There is no abdominal tenderness. Musculoskeletal:      Cervical back: Normal range of motion and neck supple. Lumbar back: Tenderness and bony tenderness present. Decreased range of motion. Positive right straight leg raise test and positive left straight leg raise test.      Right lower le+ Pitting Edema present. Left lower le+ Pitting Edema present. Right foot: Decreased range of motion. No deformity, bunion, Charcot foot, foot drop or prominent metatarsal heads. Left foot: Decreased range of motion. No deformity, bunion, Charcot foot, foot drop or prominent metatarsal heads. Feet:      Right foot:      Protective Sensation: 5 sites tested. 1 site sensed. Skin integrity: Callus and dry skin present.  No ulcer, 11  9 - 17 mmol/L Final    GFR Non- 10/01/2022 40 (A)  >60 mL/min Final    GFR  10/01/2022 48 (A)  >60 mL/min Final    GFR Comment 10/01/2022        Final    Comment: Average GFR for 52-63 years old:   80 mL/min/1.73sq m  Chronic Kidney Disease:   <60 mL/min/1.73sq m  Kidney failure:   <15 mL/min/1.73sq m              eGFR calculated using average adult body mass. Additional eGFR calculator available at:        [email or web address]            Pro-BNP 10/01/2022 195  <300 pg/mL Final    Comment:       An age-independent cutoff point of 300 pg/ml has a 98% negative predictive value excluding   acute heart failure.       Magnesium 10/01/2022 1.8  1.6 - 2.6 mg/dL Final       Lab Results   Component Value Date    WBC 10.6 09/26/2022    HGB 11.3 (L) 09/26/2022    HCT 35.7 (L) 09/26/2022    MCV 78.2 (L) 09/26/2022     09/26/2022            Lab Results   Component Value Date    ALT 11 09/26/2022    AST 15 09/26/2022    ALKPHOS 101 09/26/2022    BILITOT 0.4 09/26/2022       Lab Results   Component Value Date    TSH 1.78 09/26/2022       Lab Results   Component Value Date    CHOL 151 09/26/2022    CHOL 137 09/12/2021    CHOL 121 07/24/2021     Lab Results   Component Value Date    TRIG 336 (H) 09/26/2022    TRIG 416 (H) 09/12/2021    TRIG 213 (H) 07/24/2021     Lab Results   Component Value Date    HDL 33 (L) 09/26/2022    HDL 30 (L) 09/12/2021    HDL 32 (L) 07/24/2021     Lab Results   Component Value Date    LDLCHOLESTEROL 51 09/26/2022    LDLCHOLESTEROL      09/12/2021    LDLCHOLESTEROL 46 07/24/2021     Lab Results   Component Value Date    CHOLHDLRATIO 4.6 09/26/2022    CHOLHDLRATIO 4.6 09/12/2021    CHOLHDLRATIO 3.8 07/24/2021         Lab Results   Component Value Date    TSHHEREO32 1313 (H) 09/26/2022     Lab Results   Component Value Date    FOLATE 15.1 09/26/2022     Lab Results   Component Value Date    VITD25 38.7 02/28/2022         Orders Placed This Encounter   Medications    COVID-19 mRNA Vac-Juan David,Pfizer, (PFIZER-BIONT COVID-19 VAC-JUAN DAVID) 30 MCG/0.3ML SUSP injection     Sig: Inject 0.3 mLs into the muscle once for 1 dose DUE FOR BOOSTER, PLEASE LET PT KNOW WHEN READY     Dispense:  0.3 mL     Refill:  0       Orders Placed This Encounter   Procedures    Influenza, FLUCELVAX, (age 10 mo+), IM, PF, 0.5 mL    CBC     Standing Status:   Future     Standing Expiration Date:   10/11/2023    Comprehensive Metabolic Panel     Standing Status:   Future     Standing Expiration Date:   12/8/2022    Vitamin D 25 Hydroxy     Standing Status:   Future     Standing Expiration Date:   10/11/2023    Vitamin B12 & Folate     Standing Status:   Future     Standing Expiration Date:   12/8/2022    Uric Acid     Standing Status:   Future     Standing Expiration Date:   10/11/2023    PSA Screening     Standing Status:   Future     Standing Expiration Date:   10/12/2023    Magnesium     Standing Status:   Future     Standing Expiration Date:   10/11/2023    Phosphorus     Standing Status:   Future     Standing Expiration Date:   10/11/2023    Brain Natriuretic Peptide     Standing Status:   Future     Standing Expiration Date:   10/11/2023     DIABETES FOOT EXAM       There are no discontinued medications. On this date 10/11/2022 I have spent 39 minutes reviewing previous notes, test results and face to face with the patient discussing the diagnosis and importance of compliance with the treatment plan as well as documenting on the day of the visit. This note was completed by using the assistance of a speech-recognition program. However, inadvertent computerized transcription errors may be present. Although every effort was made to ensure accuracy, no guarantees can be provided that every mistake has been identified and corrected by editing . An electronic signature was used to authenticate this note.   Electronically signed by Arthur Hui MD on 10/19/2022 at 8:18 AM

## 2022-10-11 NOTE — PATIENT INSTRUCTIONS
Patient Education        Diabetes Blood Sugar Emergencies: Your Action Plan  How can you prevent a blood sugar emergency? An important part of living with diabetes is keeping your blood sugar in your target range. You'll need to know what to do if it's too high or too low. Managing your blood sugar levels helps you avoid emergencies. This care sheet will teach you about the signs of high and low blood sugar. It will help you make an action plan with your doctor for when these signs occur. Low blood sugar is more likely to happen if you take certain medicines for diabetes. It can also happen if you skip a meal, drink alcohol, or exercise more than usual.  You may get high blood sugar if you eat differently than you normally do. One example is eating more carbohydrate than usual. Having a cold, the flu, or other sudden illness can also cause high blood sugar levels. Levels can also rise if you miss a dose of medicine. Any change in how you take your medicine may affect your blood sugar level. So it's important to work with your doctor before you make any changes. Track your blood sugar  Work with your doctor to fill in the blank spaces below that apply to you. Track your levels, know your target range, and write down ways you can get your blood sugar back in your target range. A log book can help you track your levels. Take the book to all of your medical appointments. Check your blood sugar _____ times a day, at these times:________________________________________________. (For example: Before meals, at bedtime, before exercise, during exercise, other.)  Your blood sugar target range before a meal is ___________________. Your blood sugar target range after a meal is _______________________. Do this--___________________________________________________--to get your blood sugar back within your safe range if your blood sugar results are _________________________________________.  (For example: Less than 70 or above 250 mg/dL.)  Call your doctor when your blood sugar results are ___________________________________. (For example: Less than 70 or above 250 mg/dL.)  What are the symptoms of low and high blood sugar? Common symptoms of low blood sugar are sweating and feeling shaky, weak, hungry, or confused. Symptoms can start quickly. Common symptoms of high blood sugar are feeling very thirsty or very hungry. You may also pass urine more often than usual. You may have blurry vision and may lose weight without trying. But some people may have high or low blood sugar without having any symptoms. That's a good reason to check your blood sugar on a regular schedule. What should you do if you have symptoms? Work with your doctor to fill in the blank spaces below that apply to you. Low blood sugar and \"the rule of 15\"  If you have symptoms of low blood sugar, check your blood sugar. If it's below _____ ( for example, below 70), eat or drink a quick-sugar food that has about 15 grams of carbohydrate. Your goal is to get your level back to your safe range. Check your blood sugar again 15 minutes later. If it's still not in your target range, take another 15 grams of carbohydrate and check your blood sugar again in 15 minutes. Repeat this until you reach your target. Then go back to your regular testing schedule. Children usually need less than 15 grams of carbohydrate. Check with your doctor or diabetes educator for the amount that is right for your child. When you have low blood sugar, it's best to stop or reduce any physical activity until your blood sugar is back in your target range and is stable. If you must stay active, eat or drink 30 grams of carbohydrate. Then check your blood sugar again in 15 minutes. If it's not in your target range, take another 30 grams of carbohydrates. Check your blood sugar again in 15 minutes. Keep doing this until you reach your target.  You can then go back to your regular testing schedule. If your symptoms or blood sugar levels are getting worse or have not improved after 15 minutes, seek medical care right away. If you take insulin, always carry a glucagon emergency kit. Be sure your family, friends, and coworkers know how to give glucagon. Here are some examples of quick-sugar foods with 15 grams of carbohydrate:  3 or 4 glucose tablets  1 tablespoon (3 teaspoons) table sugar  ½ cup to ¾ cup (4 to 6 ounces) of fruit juice or regular (not diet) soda  Hard candy (such as 6 Life Savers)  High blood sugar  If you have symptoms of high blood sugar, check your blood sugar. Your goal is to get your level back to your target range. If it's above ______ ( for example, above 250), follow these steps: If you missed a dose of your diabetes medicine, take it now. Take only the amount of medicine that you have been prescribed. Do not take more or less medicine. Give yourself insulin if your doctor has prescribed it for high blood sugar. Test for ketones, if the doctor told you to do so. If the results of the ketone test show a moderate-to-large amount of ketones, call the doctor for advice. Wait 30 minutes after you take the extra insulin or the missed medicine. Check your blood sugar again. If your symptoms or blood sugar levels are getting worse or have not improved after taking these steps, seek medical care right away. Follow-up care is a key part of your treatment and safety. Be sure to make and go to all appointments, and call your doctor if you are having problems. It's also a good idea to know your test results and keep a list of the medicines you take. Where can you learn more? Go to https://gennyewezequiel.[employer]. org and sign in to your wufoo account. Enter N659 in the [employer] box to learn more about \"Diabetes Blood Sugar Emergencies: Your Action Plan. \"     If you do not have an account, please click on the \"Sign Up Now\" link.   Current as of: April 13, 2022               Content Version: 13.4  © 1293-5716 Healthwise, Incorporated. Care instructions adapted under license by [facility] ([facility]). If you have questions about a medical condition or this instruction, always ask your healthcare professional. [address] any warranty or liability for your use of this information.

## 2022-10-11 NOTE — RESULT ENCOUNTER NOTE
Noted    Lab Results       Component                Value               Date                       LABA1C                   8.8                 09/26/2022                 LABA1C                   7.8 (H)             06/06/2022                 LABA1C                   8.8                 02/24/2022                 LABA1C                   8.8                 02/24/2022                Future Appointments  10/18/2022 10:00 AM   [facility] [id number]  10/20/2022 8:30 AM    [facility] MEDICATION MGMT       [facility] MED MGMT        [facility]  11/1/2022  10:15 AM   Jasmina Lin MD     [facility]        [facility]  11/1/2022  3:30 PM    Desirae Alvarenga MD   [facility] RenalSrv        [facility]  1/16/2023  11:00 AM   Bessie Colorado MD         [facility] derm             [facility]  3/7/2023   9:00 AM    Reymundo Slaas MD     fp sc               [facility]

## 2022-10-11 NOTE — PROGRESS NOTES
Visit Information    Have you changed or started any medications since your last visit including any over-the-counter medicines, vitamins, or herbal medicines? no   Are you having any side effects from any of your medications? -  no  Have you stopped taking any of your medications? Is so, why? -  no    Have you seen any other physician or provider since your last visit? No  Have you had any other diagnostic tests since your last visit? No  Have you been seen in the emergency room and/or had an admission to a hospital since we last saw you? No  Have you had your routine dental cleaning in the past 6 months? no    Have you activated your [employer] account? If not, what are your barriers?  Yes     Patient Care Team:  Bettie Lowery MD as PCP - General (Family Medicine)  Bettie Lowery MD as PCP - [facility]  Emma Green MD as Consulting Physician (Endocrinology)  Dorinda Gamboa DO as Consulting Physician (Cardiology)  Ariadna Stewart DPM as Surgeon (Podiatry)  Chad Travis MD as Consulting Physician (Ophthalmology)  [facility], MD as Consulting Physician (Nephrology)  Lisandro Reis MD as Surgeon (Vascular Surgery)  Vikki Osborne [facility] as Pharmacist (Pharmacist)  Acosta Mello MD as Consulting Physician (Neurology)  Kurtis Duggan MD as Consulting Physician (Gastroenterology)  NICOLE Dodson - CNP as Nurse Practitioner (Otolaryngology)  Luis Angel Dupont MD as Consulting Physician (Oncology)  Kaila Rutledge MD as Surgeon (Ophthalmology)  Waldo Aschoff, MD as Consulting Physician (Urology)  Vijay Alonso MD as Consulting Physician (Pulmonology)  Priya Arriaza DO as Consulting Physician (Otolaryngology)    Medical History Review  Past Medical, Family, and Social History reviewed and does contribute to the patient presenting condition    Health Maintenance   Topic Date Due    Diabetic foot exam  05/02/2020    Flu vaccine (1) 08/01/2022    A1C test (Diabetic or Prediabetic)  09/06/2022    Diabetic microalbuminuria test  03/02/2023    Depression Monitoring  03/02/2023    Annual Wellness Visit (AWV)  03/03/2023    Diabetic retinal exam  04/18/2023    Lipids  09/26/2023    Colorectal Cancer Screen  04/12/2024    DTaP/Tdap/Td vaccine (3 - Td or Tdap) 09/12/2028    Hepatitis B vaccine  Completed    Shingles vaccine  Completed    Pneumococcal 0-64 years Vaccine  Completed    COVID-19 Vaccine  Completed    Hepatitis C screen  Completed    HIV screen  Completed    Hepatitis A vaccine  Aged Out    Hib vaccine  Aged Out    Meningococcal (ACWY) vaccine  Aged Out

## 2022-10-14 ENCOUNTER — HOSPITAL ENCOUNTER (OUTPATIENT)
Age: 57
Discharge: HOME | End: 2022-10-14
Payer: COMMERCIAL

## 2022-10-14 DIAGNOSIS — Z12.5 PROSTATE CANCER SCREENING: ICD-10-CM

## 2022-10-14 DIAGNOSIS — I13.0 BENIGN HYPERTENSIVE HEART AND CKD, STAGE 3 (GFR 30-59), W CHF (HCC): Primary | ICD-10-CM

## 2022-10-14 DIAGNOSIS — E11.42 TYPE 2 DIABETES MELLITUS WITH DIABETIC POLYNEUROPATHY, WITH LONG-TERM CURRENT USE OF INSULIN (HCC): ICD-10-CM

## 2022-10-14 DIAGNOSIS — E87.6 HYPOKALEMIA: ICD-10-CM

## 2022-10-14 DIAGNOSIS — N18.30 BENIGN HYPERTENSIVE HEART AND CKD, STAGE 3 (GFR 30-59), W CHF (HCC): ICD-10-CM

## 2022-10-14 DIAGNOSIS — I50.32 CHRONIC DIASTOLIC CONGESTIVE HEART FAILURE (HCC): ICD-10-CM

## 2022-10-14 DIAGNOSIS — I13.0 BENIGN HYPERTENSIVE HEART AND CKD, STAGE 3 (GFR 30-59), W CHF (HCC): ICD-10-CM

## 2022-10-14 DIAGNOSIS — Z79.4 TYPE 2 DIABETES MELLITUS WITH DIABETIC POLYNEUROPATHY, WITH LONG-TERM CURRENT USE OF INSULIN (HCC): ICD-10-CM

## 2022-10-14 DIAGNOSIS — E55.9 VITAMIN D DEFICIENCY: ICD-10-CM

## 2022-10-14 DIAGNOSIS — N18.30 BENIGN HYPERTENSIVE HEART AND CKD, STAGE 3 (GFR 30-59), W CHF (HCC): Primary | ICD-10-CM

## 2022-10-14 LAB
ABSOLUTE EOS #: 0.3 K/UL (ref 0–0.4)
ABSOLUTE LYMPH #: 0.8 K/UL (ref 1–4.8)
ABSOLUTE MONO #: 0.6 K/UL (ref 0.1–1.3)
ALBUMIN SERPL-MCNC: 3.9 G/DL (ref 3.5–5.2)
ALP BLD-CCNC: 103 U/L (ref 40–129)
ALT SERPL-CCNC: 12 U/L (ref 5–41)
ANION GAP SERPL CALCULATED.3IONS-SCNC: 14 MMOL/L (ref 9–17)
AST SERPL-CCNC: 14 U/L
BASOPHILS # BLD: 0 % (ref 0–2)
BASOPHILS ABSOLUTE: 0 K/UL (ref 0–0.2)
BILIRUB SERPL-MCNC: 0.3 MG/DL (ref 0.3–1.2)
BUN BLDV-MCNC: 41 MG/DL (ref 6–20)
CALCIUM SERPL-MCNC: 9 MG/DL (ref 8.6–10.4)
CHLORIDE BLD-SCNC: 98 MMOL/L (ref 98–107)
CO2: 29 MMOL/L (ref 20–31)
CREAT SERPL-MCNC: 1.54 MG/DL (ref 0.7–1.2)
EOSINOPHILS RELATIVE PERCENT: 3 % (ref 0–4)
FERRITIN: 103 NG/ML (ref 30–400)
FOLATE: 17.9 NG/ML
GFR SERPL CREATININE-BSD FRML MDRD: 52 ML/MIN/1.73M2
GLUCOSE BLD-MCNC: 210 MG/DL (ref 70–99)
HCT VFR BLD CALC: 34.5 % (ref 41–53)
HCT VFR BLD CALC: 34.5 % (ref 41–53)
HEMOGLOBIN: 11.1 G/DL (ref 13.5–17.5)
HEMOGLOBIN: 11.1 G/DL (ref 13.5–17.5)
IRON SATURATION: 15 % (ref 20–55)
IRON: 44 UG/DL (ref 59–158)
LYMPHOCYTES # BLD: 9 % (ref 24–44)
MAGNESIUM: 1.8 MG/DL (ref 1.6–2.6)
MCH RBC QN AUTO: 25.9 PG (ref 26–34)
MCH RBC QN AUTO: 25.9 PG (ref 26–34)
MCHC RBC AUTO-ENTMCNC: 32.1 G/DL (ref 31–37)
MCHC RBC AUTO-ENTMCNC: 32.1 G/DL (ref 31–37)
MCV RBC AUTO: 80.6 FL (ref 80–100)
MCV RBC AUTO: 80.6 FL (ref 80–100)
MONOCYTES # BLD: 6 % (ref 1–7)
PDW BLD-RTO: 19.1 % (ref 11.5–14.9)
PDW BLD-RTO: 19.1 % (ref 11.5–14.9)
PHOSPHORUS: 3.2 MG/DL (ref 2.5–4.5)
PLATELET # BLD: 163 K/UL (ref 150–450)
PLATELET # BLD: 163 K/UL (ref 150–450)
PMV BLD AUTO: 8.5 FL (ref 6–12)
PMV BLD AUTO: 8.5 FL (ref 6–12)
POTASSIUM SERPL-SCNC: 3.6 MMOL/L (ref 3.7–5.3)
PRO-BNP: 164 PG/ML
PROSTATE SPECIFIC ANTIGEN: 1 NG/ML
RBC # BLD: 4.28 M/UL (ref 4.5–5.9)
RBC # BLD: 4.28 M/UL (ref 4.5–5.9)
SEG NEUTROPHILS: 82 % (ref 36–66)
SEGMENTED NEUTROPHILS ABSOLUTE COUNT: 7.5 K/UL (ref 1.3–9.1)
SODIUM BLD-SCNC: 141 MMOL/L (ref 135–144)
TOTAL IRON BINDING CAPACITY: 301 UG/DL (ref 250–450)
TOTAL PROTEIN: 7.2 G/DL (ref 6.4–8.3)
UNSATURATED IRON BINDING CAPACITY: 257 UG/DL (ref 112–347)
URIC ACID: 9 MG/DL (ref 3.4–7)
VITAMIN B-12: 1651 PG/ML (ref 232–1245)
VITAMIN D 25-HYDROXY: 36.4 NG/ML
WBC # BLD: 9.3 K/UL (ref 3.5–11)
WBC # BLD: 9.3 K/UL (ref 3.5–11)

## 2022-10-14 PROCEDURE — 84100 ASSAY OF PHOSPHORUS: CPT

## 2022-10-14 PROCEDURE — 85025 COMPLETE CBC W/AUTO DIFF WBC: CPT

## 2022-10-14 PROCEDURE — 85027 COMPLETE CBC AUTOMATED: CPT

## 2022-10-14 PROCEDURE — 36415 COLL VENOUS BLD VENIPUNCTURE: CPT

## 2022-10-14 PROCEDURE — 82306 VITAMIN D 25 HYDROXY: CPT

## 2022-10-14 PROCEDURE — G0103 PSA SCREENING: HCPCS

## 2022-10-14 PROCEDURE — 83880 ASSAY OF NATRIURETIC PEPTIDE: CPT

## 2022-10-14 PROCEDURE — 83550 IRON BINDING TEST: CPT

## 2022-10-14 PROCEDURE — 82746 ASSAY OF FOLIC ACID SERUM: CPT

## 2022-10-14 PROCEDURE — 83540 ASSAY OF IRON: CPT

## 2022-10-14 PROCEDURE — 82728 ASSAY OF FERRITIN: CPT

## 2022-10-14 PROCEDURE — 84550 ASSAY OF BLOOD/URIC ACID: CPT

## 2022-10-14 PROCEDURE — 80053 COMPREHEN METABOLIC PANEL: CPT

## 2022-10-14 PROCEDURE — 82607 VITAMIN B-12: CPT

## 2022-10-14 PROCEDURE — 83735 ASSAY OF MAGNESIUM: CPT

## 2022-10-14 NOTE — RESULT ENCOUNTER NOTE
Please notify patient: Mildly improved kidney function  Mildly low potassium 3.6, to increase the potassium to 23 times a day for 3 days. Blood sugar 210  Uric acid is very high if he drinks any pop he must stop drinking pop and juices as he is taking allopurinol 300 mg daily? When having decreased kidney function, the elimination of the uric acid is decreased.   Anemia is stable    Otherwise labs within normal limits  continue current treatment      Recheck kidney function and potassium level in 1 week, new orders placed    Future Appointments  10/18/2022 10:00 AM   [facility]  [id number]  10/20/2022 8:30 AM    [facility] MEDICATION MGMT       [facility] MED MGMT        [facility]  11/1/2022  10:15 AM   Barrington Bennett MD     [facility]        [facility]  11/1/2022  3:30 PM    Qiana Baez MD   [facility] RenalSrv        [facility] Renal Se  1/16/2023  11:00 AM   Per Flynn MD          derm             [facility]  3/7/2023   9:00 AM    Mitali Magaña MD     [facility]

## 2022-10-17 NOTE — RESULT ENCOUNTER NOTE
Noted  I understand his frustration, he does have severe COPD and CHF, on continuous oxygen, we are treating him with respect and we are going to respect his wish      Future Appointments  10/18/2022 10:00 AM   [facility]  [id number]  10/20/2022 8:30 AM    [facility] MEDICATION MGMT       [facility] MED MGMT         [place]  11/1/2022  10:15 AM   Keshia Buchanan MD     [facility]        [facility]  11/1/2022  3:30 PM    Amanda Saeed MD   [facility] RenalSrv        [facility] Renal Se  1/16/2023  11:00 AM   Bartolo Rahman MD         mh derm             [facility]  3/7/2023   9:00 AM    Veronica Tran MD     fp sc               [facility]

## 2022-10-18 ENCOUNTER — HOSPITAL ENCOUNTER (OUTPATIENT)
Dept: OTHER | Age: 57
Discharge: HOME | End: 2022-10-18
Payer: COMMERCIAL

## 2022-10-18 VITALS
WEIGHT: 315 LBS | SYSTOLIC BLOOD PRESSURE: 138 MMHG | RESPIRATION RATE: 24 BRPM | HEART RATE: 97 BPM | BODY MASS INDEX: 42.66 KG/M2 | HEIGHT: 72 IN | OXYGEN SATURATION: 97 % | DIASTOLIC BLOOD PRESSURE: 74 MMHG

## 2022-10-18 PROCEDURE — 99211 OFF/OP EST MAY X REQ PHY/QHP: CPT

## 2022-10-18 NOTE — PROGRESS NOTES
Pt here for CHF Clinic visit with daughter. Pt wearing 3 liters NC, O2 sat 97% and states he has recently been through bronchitis. Still remains with a rare cough and is coughing up phlegm. Pt states he feels better but he knows his breathing is getting worse. Pt also was diagnosed with an umbilical hernia which causes him pain at times and makes it difficult to eat. Pt is morbidly obese, abdomen is rotund,and firm. Pt weight 437.9 lbs down to 430.1 lb since last visit. 1+ RLE and 1+ LLE edema noted. Lungs remain diminished throughout. Pt states he has not been sleeping well, still having trouble getting used to the NIV settings on CPAP machine. Pt recently had BMP and BNP labs drawn. BNP down to 164. BMP labs slightly improving, potassium 3.6. pt states he was unable to take the potassium pills while having swallowing problems while having bronchitis. Pt states he has appointments coming up with Dr Bradly Osei in December and Dr Villa All in November. Pt instructed to inform CHF Clinic of any changes. Pt verbalized he has not been taking his medications as prescribed, mostly is potassium pills. And has also stated he does not want to have his potassium level rechecked. Educated pt on taking medications as prescribed. Next CHF Clinic appointment scheduled for November 15 at 10:00, CHF Clinic lab order form faxed for signature. Verbally reviewed importance of medication compliance with patient; patient verbalized understanding. Discussed 2000mg/day sodium restricted diet; patient verbalized understanding. Moderate daily exercise encouraged as tolerated. Discussed rest breaks as needed; patient verbalized understanding. Patient instructed to weigh self at the same time of each day, using same clothes and same scale; reinforced teaching to monitor for 3-5 lb weight increase over 1-2 days, and to notify the CHF clinic at [phone] or physician office if weight change noted.   Patient verbalized understanding. Risks of smoking discussed with the patient if applicable; patient strongly discouraged to smoke. Patient verbalized understanding. Signs and symptoms of CHF discussed with patient, such as feeling more tired than normal, feeling short of breath, coughing that increases when you lie down, sudden weight gain, swelling of your feet, legs or belly. Patient verbalized understanding to notify the CHF clinic at [phone] or physician office if these symptoms occur. Compliance with plan of care and further disease process causes discussed with patient, patient encouraged to keep all follow up appointments. Patient verbalized understanding.

## 2022-10-19 ASSESSMENT — ENCOUNTER SYMPTOMS: APNEA: 1

## 2022-10-20 ENCOUNTER — HOSPITAL ENCOUNTER (OUTPATIENT)
Dept: PHARMACY | Age: 57
Setting detail: THERAPIES SERIES
Discharge: HOME | End: 2022-10-20
Payer: COMMERCIAL

## 2022-10-20 PROCEDURE — 99213 OFFICE O/P EST LOW 20 MIN: CPT

## 2022-10-20 NOTE — PROGRESS NOTES
Diabetic Medication Management   [address]    [address] [facility]. [place], [address] [facility]  Phone: [phone]  Fax: [phone]    NAME: Gerson Culver. MEDICAL RECORD NUMBER:  [id number]  AGE: 61 y.o. GENDER: male  : 1965  EPISODE DATE:  10/20/2022       Mr. Keeley Leigh was referred to [address] [place] [address] Medication Management Services by Dr. Luis Carlos Love, Special instructions include: titrate all medications (as defined in clinic's policy and procedure)    Patient seen in office. Goals per referral:   Fasting blood glucose: < 130  Peak postprandial glucose: < 180  A1C: < 7    Other goals:  Blood pressure goal: 130/80  Weight loss goal (~10%): Target weight  410 to be reached by date: 2023 (3-6 months)  Physical Activity goal:    Discussed but nothing formal at this time and no specific goals set  Smoking Cessation   Quit Date: Working on stopping     Cholesterol at target:   Date: Yes  22  Annual eye exam:    Date: 2022  Comprehensive annual foot exam:   Date:  2022  Annual urine Albumin and serum creatinine:   Date:  microalbumin <12 mg/L (3/2/22), SrCr 1.54mg/dL (10/1422) eGFR 52 ml/min        Subjective   Mr. Keeley Leigh is a 61 y.o. male here for the Diabetes Service for self-management education, medication review including over the counter medications and herbal products, overall well-being assessment, transition of care and any needed adjustments with updates and recommendations communicated to the referring physician. Patient's name and  verified. Patient Findings:    Patient had double ear infection and strep throat. Patient was very ill for about 6 days that did not even want to get out of bed and states he had a lot of plegm. Was not able to take medications due to throat being so swollen. Patient started to feel better on 10/14. Patient was on prednisolone, nystatin susp, cough suppressant, and azithromycin all liquid finishing on 10/13.   Patient states blood sugars were very elevated with steroid/illness per patient. See below for [facility] report. Patient admits he increased his insulin on his own in effort to reduce blood sugar due to having some readings in the 600's. Was told to go to ED by provider but refused. Patient also saw PCP who told her his COPD and heart failure has worsened but upon review of labwork done and provider note appears as if statement was patient has severe COPD and heart failure but no significant worsening. Patient has been compliant with his insulin doing 0.65 with breakfast, .55 with lunch and 0.25-.35 in the evening with dinner. Patient made dose changes in insulin dose on his own as reported above. Patient reports eating meals at breakfast and dinner but only stack (peanut butter crackers or sandwich) for lunch. Still reports skipping meals completely about twice a week. If skips will skip dinner but still has a sub before bed. Completely cut out fast food. Patient still eating [employer] sandwich for evening snack before bed. Has a 6 in cold cut on wheat bread loaded with veges. Admits he has still at times taken insulin without eating. Admits he has had hypoglycemia following this. Again discussed dangers of taking insulin when has not eaten. Discussed never skipping meals as he only does this when daughter cooks and he does not like it. Suggested he eat a peanut butter sandwich or alternative in this case. Instructed him to omit his insulin when he does not eat and if he finds this results in elevated blood sugar he should take no more than half a dose of insulin. Patient also reported taking a 4th dose of insulin at about 10pm when he eats his subway sandwich. Instructed him to not take more than 3 doses of insulin. Review of blood sugar data from [facility] show blood sugars are not elevating significantly after subway sandwich and patient is at risk of hypoglycemia.   Instructed patient to use closer to 0.30-0.35 insulin with dinner if needed but to omit any insulin with sandwich as this is two close dosing and resulting in hypoglycemia. Patient concerned about blood sugars going up in middle on night and explained that may be result of bodies response to lower blood sugars to release sugar resulting in elevated blood sugars. Still on non-invasive ventilator and still not tolerating well. States alarms go off about 4 times a hour. Has appt with pulmonologist in November. Patient has not seen pulmonologist since starting but does plan to talk to them at next appt. Did get new filters and new masks since last appt. Continues to have chest pain that requires he take nitroglycerin. Does not call 911 but has told cardiologist and will have an appt in November to discuss. Patient saw Francisca Grace endocrinology on 9/26 and had POC A1c reported at 8.8. NO changes were made. Will see Dr. General Currie in November. Patient did make appt with dermatologist but had to cancel due to illness. Now has appt on Dec 6th. Patient reports smoking 1 cigareette in last month when was with sister and she was smoking. Patient did stop Chantix after last appt. Still having urge to smoke at times but workign thru urge - suger free hard candy. Toothpick  . No exercise currently. Will get back to bike    Review of blood glucose data per [facility] [place] for last month(below) shows patient's blood sugars have been more elevated which is expected with recent illness and steroids but reports show patient has been elevated for at least the four weeks previous. Patient having elevated blood sugars all morning/early afternoon with blood sugars going low later afternoon / before dinner. Blood sugars go up after dinner then stay lower until increase in mid over night. Patient has enough insulin and needles. Has enought One touch ultra glucose strips. Has enough lancets. Has enough freestyle sensors.        []  Missed doses   []  Emergency Room Visit    []  Medication changes  []  Hospitalization   [x]  Diet changes   [x]  Acute illness   []  Activity changes      Symptoms of hypoglycemia    []  None    []  Shakiness    [x]  Lightheadedness or dizziness   []  Confusion      []  Sweating   [x]  Other wakes up from sleeping       Symptoms of hyperglycemia -.    []  None   [x]  Frequent urination    []  Increased thirst   []  Other    Medication adverse reactions (none due to diabetic medicaitons)   [x]  None   []  Diarrhea / Nausea / Vomiting / Constipation / flatulence   []  Hypertension   []  Peripheral edema     []  Signs of infection   []  Headache   []  Vision changes   []  Increased cholesterol    []  Weight gain   []  Change in renal function   []  Increased potassium  []  Other          If recent hospital admission / ED visit, was this related to Diabetes No:Chest pain      Objective     PMHx:    Past Medical History:   Diagnosis Date    Acute kidney injury superimposed on CKD ([address] 4/10/2013    Acute on chronic congestive heart failure ([address]     Acute on chronic kidney failure ([address] 07/20/2017    Acute on chronic respiratory failure ([address] 10/02/2018    Acute on chronic respiratory failure with hypoxia ([address] 9/30/2021    Adhesive capsulitis of left shoulder 03/25/2017    Anemia, normocytic normochromic 9/12/2021    Anxiety 10/02/2016    smokes marijuana for this    Arthropathy, unspecified, other specified sites 06/13/2013    Asthma     B12 deficiency     Bilateral lower leg cellulitis 02/17/2016    Blood in stool     CAD (coronary artery disease)     Cardiovascular stress test abnormal 2018    Cellulitis of both lower extremities 05/25/2017    Cellulitis of leg, left 07/20/2017    CHF (congestive heart failure), NYHA class III ([address] 08/14/2013    Chronic back pain     Chronic bronchitis (HCC)     Chronic headaches     was referred to neuro, testing scheduled    Chronic infective otitis externa 4/28/2017    Chronic kidney disease     Chronic malignant otitis externa of both ears 7/24/2020    Chronic respiratory failure ([address]     was on vent    Chronic ulcer of left leg, with fat layer exposed ([address] 02/22/2019    healed    Class 2 severe obesity due to excess calories with serious comorbidity and body mass index (BMI) of 35.0 to 35.9 in adult (HCC)     (BMI 35.0-39.9 without comorbidity)    COPD exacerbation (HCC) 11/02/2016    Diabetic neuropathy ([address] 08/14/2013    Displacement of lumbar intervertebral disc without myelopathy 06/13/2013    Ear infection     RIGHT    Elevated troponin     Essential hypertension     Facial cellulitis 2012    Fall 03/25/2017    GERD (gastroesophageal reflux disease)     Head injury     Hearing loss in right ear     pencil pierced ear as a child    Hepatic steatosis 12/03/2015    History of general anesthesia complication     has woke up during surgery under anesthesia    History of rib fracture 12/03/2015    Chronic     Hyperlipidemia     Hypersomnia     can go multiple days without sleeping    Hypertension     Insomnia     Intolerance of continuous positive airway pressure (CPAP) ventilation 07/20/2017    Iron deficiency     Localized rash     gets frequently in axilla, groin, in any fold, on several topical treatments for this    Lymphedema of both lower extremities 4/27/2021    Magnesium deficiency     Mastoiditis of left side     Mixed conductive and sensorineural hearing loss of both ears 01/10/2017    Per ENT    Mixed type COPD (chronic obstructive pulmonary disease) ([address]     On home O2, multiple inhlaers, nebulizer    Moderate recurrent major depression ([address] 10/02/2016    Morbid obesity with BMI of 45.0-49.9, adult ([address] 06/16/2015    NSTEMI (non-ST elevated myocardial infarction) ([address]     On home oxygen therapy     3 Lpm prn    Open wound of groin 12/19/2018    healed     BARBY on CPAP     Osteoarthritis     Otitis externa of left ear     Pancreatitis chronic     Persistent depressive disorder 2019    Renal insufficiency     proteinuria    Seizures ([facility] 2019    Severe depression ([facility] 2013    Spinal stenosis of lumbar region without neurogenic claudication 2016    MRI lumbar 12/30/15 L3-L4: There is broad-based bulging disc which appears protruding left laterally causing flattening of the ventral thecal sac. In addition, there is facet arthropathy with mild hypertrophic changes. There is borderline central canal stenosis with  evidence of moderate left neural foraminal narrowing and mild right neural foramina narrowing.    L4-L5: There is broad-based protrud    Syncope 2017    Tinnitus of both ears 01/10/2017    Per ENT    Type 2 diabetes mellitus with stage 3 chronic kidney disease, with long-term current use of insulin ([facility] 2016    due to underlying condition with hyperosmolarity without coma    Type II or unspecified type diabetes mellitus without mention of complication, not stated as uncontrolled     uncontrolled    Uncontrolled type 2 diabetes mellitus with hyperglycemia ([facility] 7/15/2013    Unstable angina ([facility] 2021    Vitamin D deficiency     Wears glasses     for reading       Social History:    Social History     Tobacco Use    Smoking status: Former     Packs/day: 0.50     Years: 33.00     Pack years: 16.50     Types: Cigarettes     Start date: 1985     Quit date: 2020     Years since quittin.9    Smokeless tobacco: Former     Types: Snuff     Quit date: 1995    Tobacco comments:     Quit smoking again 8/15/2022   Substance Use Topics    Alcohol use: No     Alcohol/week: 0.0 standard drinks       Pertinent Labs:    Lab Results   Component Value Date    LABA1C 8.8 2022    LABA1C 7.8 (H) 2022    LABA1C 8.8 2022    LABA1C 8.8 2022     Lab Results   Component Value Date    CHOL 151 2022    TRIG 336 (H) 2022    HDL 33 (L) 2022     Lab Results   Component Value Date    CREATININE 1.54 (H) 10/14/2022 BUN 41 (H) 10/14/2022     10/14/2022    K 3.6 (L) 10/14/2022    CL 98 10/14/2022    CO2 29 10/14/2022     Lab Results   Component Value Date/Time    ALT 12 10/14/2022 11:46 AM         Wt Readings from Last 3 Encounters:   10/18/22 (!) 430 lb 1.6 oz (195.1 kg)   10/11/22 (!) 436 lb (197.8 kg)   09/06/22 (!) 437 lb 14.4 oz (198.6 kg)       Current medications:  Prior to Admission medications    Medication Sig Start Date End Date Taking? Authorizing Provider   docusate sodium (COLACE) 100 MG capsule Take 1 capsule by mouth 2 times daily For constipation 10/10/22   Reymundo Salas MD   nystatin (MYCOSTATIN) 052310 UNIT/ML suspension Take 5 mLs by mouth 4 times daily Swish and swallow 10/4/22   Reymundo Salas MD   oxyCODONE HCl (OXY-IR) 10 MG immediate release tablet Take 1 tablet by mouth every 8 hours as needed for Pain for up to 30 days. 9/29/22 10/29/22  Reymundo Salas MD   bumetanide (BUMEX) 1 MG tablet Take 1 tablet by mouth 2 times daily Dose decreased 9/26/2022 due to acute kidney injury 9/26/22   Reymundo Salas MD   allopurinol (ZYLOPRIM) 300 MG tablet Take 1 tablet by mouth daily For gout, stop medication if any rash develops 9/26/22   Reymundo Salas MD   metOLazone (ZAROXOLYN) 2.5 MG tablet Take 1 tablet by mouth daily Take 30 mins before am Bumex dose 9/14/22   Desirae Alvarenga MD   potassium chloride (KLOR-CON M) 20 MEQ extended release tablet Take 1 tablet by mouth 2 times daily 9/14/22   Desirae Alvarenga MD   vitamin D3 (CHOLECALCIFEROL) 25 MCG (1000 UT) TABS tablet TAKE ONE TABLET BY MOUTH DAILY 9/6/22   Reymundo Salas MD   varenicline (CHANTIX) 1 MG tablet Take 1 mg by mouth 2 times daily    Historical Provider, MD   blood glucose monitor strips Test 3 times a day & as needed for symptoms of irregular blood glucose. Dispense sufficient amount for indicated testing frequency plus additional to accommodate PRN testing needs. One touch Ultra blue.  To be used and wash the left armpit with it 3/2/22   Arthur Hui MD   venlafaxine (EFFEXOR XR) 150 MG extended release capsule Take 1 capsule by mouth every morning Dose increased 3/2/2022 3/2/22   Arthur Hui MD   naloxone 4 MG/0.1ML LIQD nasal spray 1 spray by Nasal route as needed for Opioid Reversal Patient needs counseling 2/27/22   Arthur Hui MD   butalbital-acetaminophen-caffeine (FIORICET, ESGIC) -40 MG per tablet Take 1 tablet by mouth every 8 hours as needed for Headaches 2/11/22   Kem Parker MD   MAGNESIUM-OXIDE 400 (241.3 Mg) MG TABS tablet Take 1 tablet by mouth 2 times daily Frequency increased to 10/20/2022 2/10/22   Arthur Hui MD   FEROSUL 325 (65 Fe) MG tablet TAKE ONE TABLET BY MOUTH DAILY 12/1/21   Arthur Hui MD   SPIRIVA RESPIMAT 2.5 MCG/ACT AERS inhaler INHALE TWO PUFFS BY MOUTH DAILY 12/1/21   Arthur Hui MD   ADVAIR -21 MCG/ACT inhaler INHALE TWO PUFFS BY MOUTH TWICE A DAY 12/1/21   Arthur Hui MD   metoprolol tartrate (LOPRESSOR) 50 MG tablet Take 1 tablet by mouth 2 times daily 11/17/21   Arthur Hui MD   MUCUS RELIEF 600 MG extended release tablet  11/8/21   Historical Provider, MD   ammonium lactate (LAC-HYDRIN) 12 % lotion Apply topically daily.  10/14/21   Arthur Hui MD   ROCKLATAN 0.02-0.005 % SOLN  10/7/21   Historical Provider, MD   epoetin leslie-epbx (RETACRIT) 3000 UNIT/ML SOLN injection Inject 1 mL into the skin three times a week 10/4/21   Awa Agarwal MD   Insulin Syringe-Needle U-100 31G X 5/16\" 1 ML MISC Use to subcutaneously inject insulin three times daily 9/22/21   Arthur Hui MD   latanoprost (XALATAN) 0.005 % ophthalmic solution 1 drop nightly    Historical Provider, MD   clopidogrel (PLAVIX) 75 MG tablet Take 1 tablet by mouth daily 8/11/21   Arthur Hui MD   nitroGLYCERIN (NITROSTAT) 0.4 MG SL tablet DISSOLVE 1 TAB UNDER TONGUE FOR CHEST PAIN - IF PAIN REMAINS AFTER 5 MIN, CALL 911 AND REPEAT DOSE. MAX 3 TABS IN 15 MINUTES 8/2/21   Bettie Lowery MD   clobetasol (TEMOVATE) 0.05 % ointment Apply topically 2 times daily for psoriasis 1/27/21   Bettie Lowery MD   ketoconazole (NIZORAL) 2 % cream Apply twice a day for yeast infection in the skin folds, for 4 weeks 1/20/21   Bettie Lowery MD   Lancets MISC Use to check blood sugar three times daily along with when necessary due to symptoms. 9/30/20   Emma Green MD   vitamin B-12 (CYANOCOBALAMIN) 500 MCG tablet Take 1 tablet by mouth daily 7/13/20   Bettie Lowery MD   Oxygen Tubing MISC by Does not apply route DX COPD. chronic respiratory failure 3/26/20   Bettie Lowery MD   ONE TOUCH ULTRASOFT LANCETS MISC Patient to test blood sugar up to 4 times daily. 11/7/19   Emma Green MD   Lancet Devices (LANCING DEVICE) MISC Provide patient with lancing device appropriate for his machine/lancing needles. 6/4/19   Bettie Lowery MD   Handicap Placard MISC by Does not apply route Can't walk greater than 200 feet. Expires in 5 years. 2/13/19   Paige Bryant MD   fluticasone (CUTIVATE) 0.05 % cream Apply topically 2 times daily  4/19/17   Historical Provider, MD   fluticasone (FLONASE) 50 MCG/ACT nasal spray 2 sprays by Nasal route daily  Patient taking differently: 2 sprays by Nasal route daily as needed (sinus symptoms) 1/16/17   Bettie Lowery MD   Melatonin 10 MG TABS Take 10 mg by mouth nightly as needed (insomnia) 12/23/16   Bettie Lowery MD   aspirin 81 MG EC tablet Take 81 mg by mouth daily.     Historical Provider, MD       Immunizations:   Most Recent Immunizations   Administered Date(s) Administered    COVID-19, MODERNA BLUE border, Primary or Immunocompromised, (age 12y+), IM, 100 mcg/0.5mL 12/15/2021    COVID-19, PFIZER GRAY top, DO NOT Dilute, (age 15 y+), IM, 30 mcg/0.3 mL 04/23/2022    DT (pediatric) 12/14/1998    Hepatitis B Adult (Engerix-B) 12/04/2019    Hepatitis B Adult (Recombivax HB) 12/04/2019    Influenza Virus Vaccine 10/12/2015    Influenza, FLUARIX, FLULAVAL, FLUZONE (age 10 mo+) AND AFLURIA, (age 1 y+), PF, 0.5mL 10/09/2020    Influenza, FLUCELVAX, (age 10 mo+), MDCK, PF, 0.5mL 10/11/2022    Pneumococcal Polysaccharide (Wykkitdcp46) 05/23/2013    Pneumococcal conjugate PCV20, PF (Prevnar 20) 04/23/2022    Tdap (Boostrix, Adacel) 09/12/2018    Zoster Recombinant (Shingrix) 02/14/2022       Home Blood Glucose Results:   Per patient's [employer] L[employer]ne report below               Patient's blood sugar is overall somewhat higher than prior. Will make changes as discussed above to lower blood sugar during the day and reduce hypoglycemia in early evening. Assessment     A1c at goal:No: 8.8 (9/26/22) POC at endocrinology office. Blood Pressure at goal: Yes  Weight at goal: No:    Physical activity: No  Physical activity at goal: No    Smoking Cessation: Yes    Cholesterol at target: Yes  Annual eye exam completed: Yes  Comprehensive Foot Exam Completed:  Yes  Annual urine albumin and serum creatinine: Yes    Statin: Yes    Appropriate?: Yes  Changes made: refill provided    ACE/ARB: Yes  Appropriate?: Yes  Changes made:     Aspirin: Yes  Appropriate?: Yes  Changes made:     Eating patterns:    [x]  My plate    []  Mediterranean diet   []  Low sodium   []  DASH diet   [x]  Portion control   [x]  Reduced calorie    []  Fast food / Restaurant  []  High glycemic index foods   []  Sugary beverages   []  Other     Comment: see above    Current Medications Affecting Diabetes:  Humulin R 500    Compliant:No  Barriers to medication therapy: anxiety / depression    Medications attempted in the past:  Metformin - cardiologist took him off but patient unsure why  Januvia - PCP took him off but patient unsure why    Recent exacerbations / new problems:  Gout / Chest pain/pressure    Last office dictation reviewed: yes        type 2 DM under worsening control as evidenced by blood sugars on Lianne and elevating A1c. Plan   Counseling at today's visit:     -Continued monitoring of blood glucose with use of Freestyle Lianne. Call with any issues with sensor. Bring data cord to each visit.      -Continue taking insulin on regular basis:  change dose to insulin at .70 at breakfast time, 50 at lunch time and .25-.35 with dinner. Patient is encouraged to not skip meals and to not take insulin if he is not going to eat. Resume some exercise as able. Physician Follow-up:   Dr Kell Ariza - Nov 21st    Medication Management Follow-up:   Diabetes Service   1 months since coming for heart failure appt already. Electronically signed by Autumn Abel RPH on 10/20/2022 at 8:18 AM     [address] in place:  Yes  Recommendation Provided To: Patient/Caregiver: 4 via In person  Intervention Detail: Dose Adjustment: 1, reason: Therapy Optimization, New Rx: 1, reason: Needs Additional Therapy, Refill(s) Provided and Scheduled Appointment  Intervention Accepted By: Patient/Caregiver: 4  Time Spent (min): 45     Yesenia Rizvi RP,Pharm. D,, BCPS, CACP  10/20/2022  8:18 AM

## 2022-10-20 NOTE — DISCHARGE INSTRUCTIONS
Try to not skip meals. If you dont feel like eating a normal meal have some peanut butter and crackers or pretzels or sandwich. Change your insulin to  0.70 with breakfast; 0.50 with lunch and 0.25--0.35 with dinner. Do not take dose of insulin with evening snack/sub. DO NOT TAKE insulin if you do not eat. If needed, only take half a dose. Try to resume exercise when able.

## 2022-10-23 DIAGNOSIS — Z79.4 TYPE 2 DIABETES MELLITUS WITH DIABETIC POLYNEUROPATHY, WITH LONG-TERM CURRENT USE OF INSULIN (HCC): ICD-10-CM

## 2022-10-23 DIAGNOSIS — E11.42 TYPE 2 DIABETES MELLITUS WITH DIABETIC POLYNEUROPATHY, WITH LONG-TERM CURRENT USE OF INSULIN (HCC): ICD-10-CM

## 2022-10-23 DIAGNOSIS — L40.9 PSORIASIS: ICD-10-CM

## 2022-10-23 DIAGNOSIS — L73.2 AXILLARY HIDRADENITIS SUPPURATIVA: ICD-10-CM

## 2022-10-23 DIAGNOSIS — L85.3 DRY SKIN DERMATITIS: ICD-10-CM

## 2022-10-23 DIAGNOSIS — L73.2 HIDRADENITIS SUPPURATIVA OF LEFT AXILLA: ICD-10-CM

## 2022-10-24 RX ORDER — AMMONIUM LACTATE 12 G/100G
LOTION TOPICAL
Qty: 222 ML | Refills: 0 | Status: SHIPPED | OUTPATIENT
Start: 2022-10-24

## 2022-10-24 RX ORDER — CHOLECALCIFEROL (VITAMIN D3) 125 MCG
500 CAPSULE ORAL DAILY
Qty: 90 TABLET | Refills: 3 | Status: SHIPPED | OUTPATIENT
Start: 2022-10-24

## 2022-10-24 RX ORDER — CLOBETASOL PROPIONATE 0.5 MG/G
OINTMENT TOPICAL
Qty: 1 EACH | Refills: 3 | Status: SHIPPED | OUTPATIENT
Start: 2022-10-24

## 2022-10-24 RX ORDER — KETOCONAZOLE 20 MG/G
CREAM TOPICAL
Qty: 1 EACH | Refills: 3 | Status: SHIPPED | OUTPATIENT
Start: 2022-10-24

## 2022-10-24 RX ORDER — NYSTATIN 100000 [USP'U]/G
POWDER TOPICAL
Qty: 60 G | Refills: 3 | Status: SHIPPED | OUTPATIENT
Start: 2022-10-24

## 2022-10-24 NOTE — TELEPHONE ENCOUNTER
Please Approve or Refuse.   Send to Pharmacy per Pt's Request:      Next Visit Date:  3/7/2023   Last Visit Date: 10/11/2022    Hemoglobin A1C (%)   Date Value   09/26/2022 8.8   06/06/2022 7.8 (H)   02/24/2022 8.8   02/24/2022 8.8             ( goal A1C is < 7)   BP Readings from Last 3 Encounters:   10/18/22 138/74   10/11/22 130/80   09/06/22 128/70          (goal 120/80)  BUN   Date Value Ref Range Status   10/14/2022 41 (H) 6 - 20 mg/dL Final     Creatinine   Date Value Ref Range Status   10/14/2022 1.54 (H) 0.70 - 1.20 mg/dL Final     Potassium   Date Value Ref Range Status   10/14/2022 3.6 (L) 3.7 - 5.3 mmol/L Final

## 2022-10-27 DIAGNOSIS — M54.42 CHRONIC MIDLINE LOW BACK PAIN WITH LEFT-SIDED SCIATICA: ICD-10-CM

## 2022-10-27 DIAGNOSIS — M51.36 DDD (DEGENERATIVE DISC DISEASE), LUMBAR: ICD-10-CM

## 2022-10-27 DIAGNOSIS — M48.061 SPINAL STENOSIS OF LUMBAR REGION WITHOUT NEUROGENIC CLAUDICATION: ICD-10-CM

## 2022-10-27 DIAGNOSIS — G89.29 CHRONIC MIDLINE LOW BACK PAIN WITH LEFT-SIDED SCIATICA: ICD-10-CM

## 2022-10-27 DIAGNOSIS — M96.1 POSTLAMINECTOMY SYNDROME OF LUMBAR REGION: ICD-10-CM

## 2022-10-27 RX ORDER — OXYCODONE HYDROCHLORIDE 10 MG/1
10 TABLET ORAL EVERY 8 HOURS PRN
Qty: 90 TABLET | Refills: 0 | Status: SHIPPED | OUTPATIENT
Start: 2022-10-27 | End: 2022-11-22 | Stop reason: SDUPTHER

## 2022-10-28 DIAGNOSIS — G89.29 CHRONIC BACK PAIN GREATER THAN 3 MONTHS DURATION: ICD-10-CM

## 2022-10-28 DIAGNOSIS — M54.9 CHRONIC BACK PAIN GREATER THAN 3 MONTHS DURATION: ICD-10-CM

## 2022-10-28 DIAGNOSIS — M48.061 SPINAL STENOSIS OF LUMBAR REGION WITHOUT NEUROGENIC CLAUDICATION: ICD-10-CM

## 2022-10-31 RX ORDER — TIZANIDINE 4 MG/1
4 TABLET ORAL EVERY 8 HOURS PRN
Qty: 90 TABLET | Refills: 3 | Status: SHIPPED | OUTPATIENT
Start: 2022-10-31

## 2022-10-31 RX ORDER — TIZANIDINE 4 MG/1
TABLET ORAL
Qty: 90 TABLET | Refills: 3 | OUTPATIENT
Start: 2022-10-31

## 2022-11-01 ENCOUNTER — OFFICE VISIT (OUTPATIENT)
Dept: ONCOLOGY | Age: 57
End: 2022-11-01
Payer: COMMERCIAL

## 2022-11-01 ENCOUNTER — TELEPHONE (OUTPATIENT)
Dept: ONCOLOGY | Age: 57
End: 2022-11-01

## 2022-11-01 VITALS
HEART RATE: 95 BPM | WEIGHT: 315 LBS | BODY MASS INDEX: 58.45 KG/M2 | TEMPERATURE: 97.8 F | DIASTOLIC BLOOD PRESSURE: 48 MMHG | SYSTOLIC BLOOD PRESSURE: 120 MMHG | RESPIRATION RATE: 18 BRPM

## 2022-11-01 DIAGNOSIS — D64.9 NORMOCYTIC ANEMIA: Primary | ICD-10-CM

## 2022-11-01 PROCEDURE — G8427 DOCREV CUR MEDS BY ELIG CLIN: HCPCS | Performed by: INTERNAL MEDICINE

## 2022-11-01 PROCEDURE — 99211 OFF/OP EST MAY X REQ PHY/QHP: CPT | Performed by: INTERNAL MEDICINE

## 2022-11-01 PROCEDURE — 99211 OFF/OP EST MAY X REQ PHY/QHP: CPT

## 2022-11-01 PROCEDURE — 1036F TOBACCO NON-USER: CPT | Performed by: INTERNAL MEDICINE

## 2022-11-01 PROCEDURE — G8417 CALC BMI ABV UP PARAM F/U: HCPCS | Performed by: INTERNAL MEDICINE

## 2022-11-01 PROCEDURE — 99214 OFFICE O/P EST MOD 30 MIN: CPT | Performed by: INTERNAL MEDICINE

## 2022-11-01 PROCEDURE — G8482 FLU IMMUNIZE ORDER/ADMIN: HCPCS | Performed by: INTERNAL MEDICINE

## 2022-11-01 PROCEDURE — 3017F COLORECTAL CA SCREEN DOC REV: CPT | Performed by: INTERNAL MEDICINE

## 2022-11-01 NOTE — TELEPHONE ENCOUNTER
Marli Davis MD VISIT  Continue PO iron  RV 6 months with labs before RV  LABS CDP FERRITIN FE TIBC ORDERS MAILED TO PT TO BE DONE ON 5/2/23  MD VISIT 5/9/23 @ 9AM  AVS PRINTED W/ INSTRUCTIONS AND GIVEN TO PT ON EXIT

## 2022-11-01 NOTE — PROGRESS NOTES
_           Chief Complaint   Patient presents with    Follow-up    Discuss Labs     DIAGNOSIS:       Normocytic anemia secondary to chronic renal insufficiency  Stage III chronic renal insufficiency  Morbid obesity  Diabetes  Multiple comorbidities as listed    CURRENT THERAPY:         PO iron  Consider epogen for Hb below 10. BRIEF CASE HISTORY:      Mr. Paulie Cottrell is a very pleasant 61 y.o. male with history of multiple co morbidities as listed. Patient is referred for evaluation of anemia. The patient has multiple comorbidities. He has diabetes mellitus for long duration. He has stage III renal insufficiency. He had so many other health problems as stated below. Patient has morbid obesity with the chronic back problems. Multiple surgeries. The patient is referred for evaluation of anemia. He denies any active bleeding. No melena or hematochezia. No hematemesis. No hematuria. He has weakness and fatigue. No dizziness. No palpitation. No fever or infections. Patient had GI evaluation with colonoscopy last year. Polyps were removed. No active bleeding. Patient is smoker of 1.5 packs per day for 40 years. .  Social alcohol drinking. INTERIM HISTORY:   Seen for follow up anemia. Clinically stable. Mild weakness and fatigue. No dizziness. No fever. No active bleeding.   PAST MEDICAL HISTORY: has a past medical history of Acute kidney injury superimposed on CKD ([address] Acute on chronic congestive heart failure (HCC), Acute on chronic kidney failure (HCC), Acute on chronic respiratory failure (HCC), Acute on chronic respiratory failure with hypoxia (HCC), Adhesive capsulitis of left shoulder, Anemia, normocytic normochromic, Anxiety, Arthropathy, unspecified, other specified sites, Asthma, B12 deficiency, Bilateral lower leg cellulitis, Blood in stool, CAD (coronary artery disease), Cardiovascular stress test abnormal, Cellulitis of both lower extremities, Cellulitis of leg, left, CHF (congestive heart failure), NYHA class III ([facility]), Chronic back pain, Chronic bronchitis ([facility]), Chronic headaches, Chronic infective otitis externa, Chronic kidney disease, Chronic malignant otitis externa of both ears, Chronic respiratory failure ([address] Chronic ulcer of left leg, with fat layer exposed ([address] Class 2 severe obesity due to excess calories with serious comorbidity and body mass index (BMI) of 35.0 to 35.9 in adult [facility]), COPD exacerbation ([address] Diabetic neuropathy ([address] Displacement of lumbar intervertebral disc without myelopathy, Ear infection, Elevated troponin, Essential hypertension, Facial cellulitis, Fall, GERD (gastroesophageal reflux disease), Head injury, Hearing loss in right ear, Hepatic steatosis, History of general anesthesia complication, History of rib fracture, Hyperlipidemia, Hypersomnia, Hypertension, Insomnia, Intolerance of continuous positive airway pressure (CPAP) ventilation, Iron deficiency, Localized rash, Lymphedema of both lower extremities, Magnesium deficiency, Mastoiditis of left side, Mixed conductive and sensorineural hearing loss of both ears, Mixed type COPD (chronic obstructive pulmonary disease) (HCC), Moderate recurrent major depression ([address] Morbid obesity with BMI of 45.0-49.9, adult ([address] NSTEMI (non-ST elevated myocardial infarction) ([address] On home oxygen therapy, Open wound of groin, BARBY on CPAP, Osteoarthritis, Otitis externa of left ear, Pancreatitis chronic, Persistent depressive disorder, Renal insufficiency, Seizures ([address] Severe depression ([address] Spinal stenosis of lumbar region without neurogenic claudication, Syncope, Tinnitus of both ears, Type 2 diabetes mellitus with stage 3 chronic kidney disease, with long-term current use of insulin ([address] Type II or unspecified type diabetes mellitus without mention of complication, not stated as uncontrolled, Uncontrolled type 2 diabetes mellitus with hyperglycemia ([facility] Unstable angina ([facility] Vitamin D deficiency, and Wears glasses. PAST SURGICAL HISTORY: has a past surgical history that includes tympanomastoidectomy (Bilateral, 09/20/2012); Coronary angioplasty with stent (03/2013); Hand tendon surgery (Left); Knee arthroscopy (Left); Nerve Block (07/31/2015); Cardiac catheterization (04/23/2018); pr esophagogastroduodenoscopy transoral diagnostic (N/A, 07/18/2018); Intracapsular cataract extraction (Right, 11/05/2019); Intracapsular cataract extraction (Left, 01/07/2020); back surgery ( (x 4) 2000,.12/2011.2/2012); Colonoscopy (11/03/2015); Colonoscopy (2013); Colonoscopy (N/A, 04/12/2021); and eye surgery. CURRENT MEDICATIONS:  has a current medication list which includes the following prescription(s): tizanidine, oxycodone hcl, vitamin b-12, ketoconazole, clobetasol, ammonium lactate, nystatin, docusate sodium, nystatin, bumetanide, allopurinol, metolazone, potassium chloride, vitamin d3, blood glucose test strips, amitriptyline, nebulizer/tubing/mouthpiece, pantoprazole, humulin r, freestyle floresita 14 day sensor, albuterol, albuterol sulfate hfa, rosuvastatin, chlorhexidine, venlafaxine, butalbital-acetaminophen-caffeine, magnesium-oxide, ferosul, spiriva respimat, advair hfa, metoprolol tartrate, mucus relief, rocklatan, epoetin leslie-epbx, insulin syringe-needle u-100, latanoprost, clopidogrel, nitroglycerin, lancets, oxygen tubing, one touch ultrasoft lancets, lancing device, handicap placard, fluticasone, fluticasone, melatonin, aspirin, varenicline, gabapentin, and naloxone. ALLERGIES:  is allergic to levofloxacin, lorazepam, nsaids, prozac [fluoxetine hcl], and vancomycin. FAMILY HISTORY: Negative for any hematological or oncological conditions. SOCIAL HISTORY:  reports that he quit smoking about 2 years ago. His smoking use included cigarettes. He started smoking about 37 years ago.  He has a 16.50 pack-year smoking history. He quit smokeless tobacco use about 27 years ago. His smokeless tobacco use included snuff. He reports current drug use. Drug: Marijuana Seabron Barban). He reports that he does not drink alcohol. REVIEW OF SYSTEMS:     General: Positive for weakness and fatigue. No unanticipated weight loss or decreased appetite. No fever or chills. Eyes: No blurred vision, eye pain or double vision. Ears: No hearing problems or drainage. No tinnitus. Throat: No sore throat, problems with swallowing or dysphagia. Respiratory: No cough, sputum or hemoptysis. No shortness of breath. No pleuritic chest pain. Cardiovascular: No chest pain, orthopnea or PND. No lower extremity edema. No palpitation. Gastrointestinal: No problems with swallowing. No abdominal pain or bloating. No nausea or vomiting. No diarrhea or constipation. No GI bleeding. Genitourinary: No dysuria, hematuria, frequency or urgency. Musculoskeletal: As above. Dermatologic: No skin rashes or pruritus. No skin lesions or discolorations. Psychiatric: No depression, anxiety, or stress or signs of schizophrenia. No change in mood or affect. Hematologic: No history of bleeding tendency. No bruises or ecchymosis. No history of clotting problems. Infectious disease: No fever, chills or frequent infections. Endocrine: No polydipsia or polyuria. No temperature intolerance. Neurologic: No headaches or dizziness. No weakness or numbness of the extremities. No changes in balance, coordination,  memory, mentation, behavior. Allergic/Immunologic: No nasal congestion or hives. No repeated infections. PHYSICAL EXAM:  The patient is not in acute distress. Vital signs: Blood pressure (!) 120/48, pulse 95, temperature 97.8 °F (36.6 °C), temperature source Temporal, resp. rate 18, weight (!) 431 lb (195.5 kg). General appearance - well appearing, not in pain or distress. Morbidly obese.   Mental status - good mood, alert and oriented  Eyes - pupils equal and reactive, extraocular eye movements intact  Ears - bilateral TM's and external ear canals normal  Nose - normal and patent, no erythema, discharge or polyps  Mouth - mucous membranes moist, pharynx normal without lesions  Neck - supple, no significant adenopathy  Lymphatics - no palpable lymphadenopathy, no hepatosplenomegaly  Chest - clear to auscultation, no wheezes, rales or rhonchi, symmetric air entry  Heart - normal rate, regular rhythm, normal S1, S2, no murmurs, rubs, clicks or gallops  Abdomen - soft, nontender, nondistended, no masses or organomegaly  Neurological - alert, oriented, normal speech, no focal findings or movement disorder noted  Musculoskeletal -limited movement due to morbid obesity and chronic back problems extremities - peripheral pulses normal, no pedal edema, no clubbing or cyanosis  Skin - normal coloration and turgor, no rashes, no suspicious skin lesions noted     Review of Diagnostic data:   Lab Results   Component Value Date    WBC 9.3 10/14/2022    HGB 11.1 (L) 10/14/2022    HCT 34.5 (L) 10/14/2022    MCV 80.6 10/14/2022     10/14/2022       Chemistry        Component Value Date/Time     10/14/2022 1146    K 3.6 (L) 10/14/2022 1146    CL 98 10/14/2022 1146    CO2 29 10/14/2022 1146    BUN 41 (H) 10/14/2022 1146    CREATININE 1.54 (H) 10/14/2022 1146        Component Value Date/Time    CALCIUM 9.0 10/14/2022 1146    ALKPHOS 103 10/14/2022 1146    AST 14 10/14/2022 1146    ALT 12 10/14/2022 1146    BILITOT 0.3 10/14/2022 1146            IMPRESSION:   Normocytic anemia secondary to chronic renal insufficiency  Stage III chronic renal insufficiency  Morbid obesity  Diabetes  Multiple comorbidities as listed    PLAN: I reviewed the labs asa april and discussed with the patient. I again explained to the patient the nature of this hematologic problem. I explained the significance of these abnormalities in layman language.    Reviewing patient's labs obviously he is having chronic normocytic anemia. Labs are getting slowly worse over the last 10 years. This is correlating with deterioration of the kidney function as well. Iron studies and vitamin B12 and folate are not showing any significant abnormalities. Patient's anemia is anemia of chronic disease secondary to chronic renal insufficiency. However hemoglobin is maintained above 10. It is improving as well. So he does not qualify for erythropoietin treatment at the present time. I recommend close monitoring and consideration of treatment with Epogen once hemoglobin is below 10. Patient totally understands and agrees with this plan. Will continue PO iron. We will evaluate him in 6 months. Sooner for any problems. Patient's questions were answered to the best of his satisfaction and he verbalized full understanding and agreement.

## 2022-11-02 DIAGNOSIS — D64.9 NORMOCYTIC ANEMIA: Primary | ICD-10-CM

## 2022-11-07 RX ORDER — BLOOD SUGAR DIAGNOSTIC
STRIP MISCELLANEOUS
Qty: 270 EACH | Refills: 4 | Status: SHIPPED | OUTPATIENT
Start: 2022-11-07

## 2022-11-07 NOTE — TELEPHONE ENCOUNTER
Please Approve or Refuse.   Send to Pharmacy per Pt's Request: [facility]      Next Visit Date:  3/7/2023   Last Visit Date: 10/11/2022    Hemoglobin A1C (%)   Date Value   09/26/2022 8.8   06/06/2022 7.8 (H)   02/24/2022 8.8   02/24/2022 8.8             ( goal A1C is < 7)   BP Readings from Last 3 Encounters:   11/01/22 122/80   11/01/22 (!) 120/48   10/18/22 138/74          (goal 120/80)  BUN   Date Value Ref Range Status   10/14/2022 41 (H) 6 - 20 mg/dL Final     Creatinine   Date Value Ref Range Status   10/14/2022 1.54 (H) 0.70 - 1.20 mg/dL Final     Potassium   Date Value Ref Range Status   10/14/2022 3.6 (L) 3.7 - 5.3 mmol/L Final

## 2022-11-14 NOTE — PROGRESS NOTES
Patient's daughter called to cancel CHF clinic visit, states father does not want to reschedule at this time.

## 2022-11-15 ENCOUNTER — HOSPITAL ENCOUNTER (OUTPATIENT)
Age: 57
Discharge: HOME | End: 2022-11-15
Payer: COMMERCIAL

## 2022-11-15 ENCOUNTER — HOSPITAL ENCOUNTER (OUTPATIENT)
Dept: PHARMACY | Age: 57
Setting detail: THERAPIES SERIES
Discharge: HOME | End: 2022-11-15
Payer: COMMERCIAL

## 2022-11-15 ENCOUNTER — HOSPITAL ENCOUNTER (OUTPATIENT)
Dept: OTHER | Age: 57
Discharge: HOME | End: 2022-11-15
Payer: COMMERCIAL

## 2022-11-15 DIAGNOSIS — E87.6 HYPOKALEMIA: Primary | ICD-10-CM

## 2022-11-15 DIAGNOSIS — N18.30 BENIGN HYPERTENSION WITH CKD (CHRONIC KIDNEY DISEASE) STAGE III (HCC): ICD-10-CM

## 2022-11-15 DIAGNOSIS — I50.32 CHRONIC DIASTOLIC CONGESTIVE HEART FAILURE (HCC): ICD-10-CM

## 2022-11-15 DIAGNOSIS — N18.30 BENIGN HYPERTENSIVE HEART AND CKD, STAGE 3 (GFR 30-59), W CHF (HCC): ICD-10-CM

## 2022-11-15 DIAGNOSIS — N18.31 STAGE 3A CHRONIC KIDNEY DISEASE (HCC): ICD-10-CM

## 2022-11-15 DIAGNOSIS — D63.1 ANEMIA IN STAGE 3A CHRONIC KIDNEY DISEASE (HCC): ICD-10-CM

## 2022-11-15 DIAGNOSIS — N18.31 ANEMIA IN STAGE 3A CHRONIC KIDNEY DISEASE (HCC): ICD-10-CM

## 2022-11-15 DIAGNOSIS — I13.0 BENIGN HYPERTENSIVE HEART AND CKD, STAGE 3 (GFR 30-59), W CHF (HCC): ICD-10-CM

## 2022-11-15 DIAGNOSIS — E13.22 SECONDARY DIABETES MELLITUS WITH STAGE 3 CHRONIC KIDNEY DISEASE (HCC): ICD-10-CM

## 2022-11-15 DIAGNOSIS — E79.0 ELEVATED URIC ACID IN BLOOD: ICD-10-CM

## 2022-11-15 DIAGNOSIS — N18.30 SECONDARY DIABETES MELLITUS WITH STAGE 3 CHRONIC KIDNEY DISEASE (HCC): ICD-10-CM

## 2022-11-15 DIAGNOSIS — E87.6 HYPOKALEMIA: ICD-10-CM

## 2022-11-15 DIAGNOSIS — I12.9 BENIGN HYPERTENSION WITH CKD (CHRONIC KIDNEY DISEASE) STAGE III (HCC): ICD-10-CM

## 2022-11-15 LAB
ANION GAP SERPL CALCULATED.3IONS-SCNC: 12 MMOL/L (ref 9–17)
ANION GAP SERPL CALCULATED.3IONS-SCNC: 14 MMOL/L (ref 9–17)
BUN BLDV-MCNC: 38 MG/DL (ref 6–20)
BUN BLDV-MCNC: 39 MG/DL (ref 6–20)
CALCIUM SERPL-MCNC: 9.1 MG/DL (ref 8.6–10.4)
CALCIUM SERPL-MCNC: 9.1 MG/DL (ref 8.6–10.4)
CHLORIDE BLD-SCNC: 97 MMOL/L (ref 98–107)
CHLORIDE BLD-SCNC: 97 MMOL/L (ref 98–107)
CO2: 29 MMOL/L (ref 20–31)
CO2: 31 MMOL/L (ref 20–31)
CREAT SERPL-MCNC: 1.55 MG/DL (ref 0.7–1.2)
CREAT SERPL-MCNC: 1.58 MG/DL (ref 0.7–1.2)
GFR SERPL CREATININE-BSD FRML MDRD: 51 ML/MIN/1.73M2
GFR SERPL CREATININE-BSD FRML MDRD: 52 ML/MIN/1.73M2
GLUCOSE BLD-MCNC: 270 MG/DL (ref 70–99)
GLUCOSE BLD-MCNC: 272 MG/DL (ref 70–99)
MAGNESIUM: 1.8 MG/DL (ref 1.6–2.6)
PHOSPHORUS: 3.1 MG/DL (ref 2.5–4.5)
POTASSIUM SERPL-SCNC: 3.3 MMOL/L (ref 3.7–5.3)
POTASSIUM SERPL-SCNC: 3.4 MMOL/L (ref 3.7–5.3)
PRO-BNP: 149 PG/ML
SODIUM BLD-SCNC: 140 MMOL/L (ref 135–144)
SODIUM BLD-SCNC: 140 MMOL/L (ref 135–144)

## 2022-11-15 PROCEDURE — 36415 COLL VENOUS BLD VENIPUNCTURE: CPT

## 2022-11-15 PROCEDURE — 80048 BASIC METABOLIC PNL TOTAL CA: CPT

## 2022-11-15 PROCEDURE — 83735 ASSAY OF MAGNESIUM: CPT

## 2022-11-15 PROCEDURE — 99213 OFFICE O/P EST LOW 20 MIN: CPT

## 2022-11-15 PROCEDURE — 83880 ASSAY OF NATRIURETIC PEPTIDE: CPT

## 2022-11-15 PROCEDURE — 84100 ASSAY OF PHOSPHORUS: CPT

## 2022-11-15 NOTE — RESULT ENCOUNTER NOTE
If he has a number for [facility] go ahead and call. He seem to have seen them today. Eat foods that have lots of potassium. These include fresh fruits, juices, and vegetables. Examples: Bananas, broccoli, spinach, apricots, sweet potatoes, tomatoes,   potatoes, carrots, artichokes, winter squash, and dried beans and peas. They also include nuts, beans, and milk. Report for chest pain, palpitations or severe muscle aches.

## 2022-11-15 NOTE — RESULT ENCOUNTER NOTE
Please notify patient: Blood glucose high at 270, cut down sweets, GFR 52 stable chronic kidney disease stage III  Low potassium    He is currently having an appointment with [facility]  Will need to increase the potassium in diet and pills  Repeat potassium in 1 week, new order placed  Future Appointments  12/14/2022 9:10 AM    [facility] MEDICATION MGMT       ST MED MGMT        [facility]  1/16/2023  11:00 AM   Ave Hearn MD          derm             [facility]  3/7/2023   9:00 AM    Gemini Cruz MD      sc               [facility]  5/9/2023   9:00 AM    Cuba Crane MD     [facility]        Zaid Rider

## 2022-11-15 NOTE — PROGRESS NOTES
Diabetic Medication Management   [address] [place] [address] Dr Edith Hopkins. [place], [address] [facility]  Phone: [phone]  Fax: [phone]    NAME: Gerson Culver. MEDICAL RECORD NUMBER:  [id number]  AGE: 61 y.o. GENDER: male  : 1965  EPISODE DATE:  11/15/2022       Mr. Keeley Leigh was referred to [facility] Medication Management Services by Dr. Luis Carlos Love, Special instructions include: titrate all medications (as defined in clinic's policy and procedure)    Patient seen in office. Goals per referral:   Fasting blood glucose: < 130  Peak postprandial glucose: < 180  A1C: < 7    Other goals:  Blood pressure goal: 130/80  Weight loss goal (~10%): Target weight  410 to be reached by date: 2023 (3-6 months)  Physical Activity goal:    Discussed but nothing formal at this time and no specific goals set  Smoking Cessation   Quit Date: Working on stopping     Cholesterol at target:   Date: Yes  22  Annual eye exam:    Date: 2022  Comprehensive annual foot exam:   Date:  2022  Annual urine Albumin and serum creatinine:   Date:  microalbumin <12 mg/L (3/2/22), SrCr 1.54mg/dL (10/1422) eGFR 52 ml/min        Subjective   Mr. Keeley Leigh is a 61 y.o. male here for the Diabetes Service for self-management education, medication review including over the counter medications and herbal products, overall well-being assessment, transition of care and any needed adjustments with updates and recommendations communicated to the referring physician. Patient's name and  verified. Patient Findings:    Appt with Dr. Caleb Louis on . Patient is doing Humulin R 500 -0.7 with breakfast (if blood sugar below 200 patient uses 0.6), 0.6 with lunch (3/7 days out of week patient does not eat and therefor skips insulin ) 0.25 with dinner. Admits it happens rarely but sometimes doesn't remember if he took his insulin. Usually takes insulin after he eats.   Discussed methods to help him remember to take insulin and to take insulin at beginning of his meal to help him remember. Decreased sensation in right foot per patient. Was noticed during foot exam with Dr. Danielle Martínez. Reviewed importance of examining feet each day. Patient reports he can not see the bottom of his feet. Suggested using a mirror on the ground or asking a family member for help. Patient rarely goes barefoot but wears slip on shoes that would allow something to get into shoe and then injure bottom of foot. Discussed how if left go this could result in significant wound and risk. Patient continues using Non-invasive vent. Reports the HEPA filter was dirty so removed it. Now not using filter as they are out of stock till Dec 1 they were told by supply facility. Patient can feel junk in from machine entering his lungs and admits to having increase in sputum that is yellow in color. Encouraged him to call his provider to discuss. Patient's daughter states they did make some calls and another provider is sending a batch of filters that were previously discontinued but are ok to use. Should arrive tomorrow and are OK to use until normal filters arrive. Discussed patient skipping lunch and therefore skipping his lunch time insulin  Without that insulin dose patient is without enough glucose control  in middle of day which is resulting in elevated blood sugars. Patient indicates if no one is  home to cook he doesn't cook for himself. Patient does have refrigerator in his room. Discussed making peanut butter and jelly sandwiches ahead and keeping in frige or drinking a meal replacement shake like glucerna. Patient agrees to try to do this and will take insulin with his lunch. Patient has not taken insulin without eating nor has he taken any extra (4th) evening doses.      Patient has had very minimal hypoglycemia (see below) but when occurs it occurs mostly in middel of night likely due to patient skipping or eating significantly less. Patient did make appt with dermatologist but had to cancel. Now has appt on Jan 16th, 2023    Patient has not smoked since last appt. .  No exercise currently. Will get back to bike    Review of blood glucose data per [facility] for last month(below) shows patient's blood sugars have slightly improved but are still significantly above goal.      Patient has enough insulin and needles. Has enought One touch ultra glucose strips. Has enough lancets. Has enough freestyle sensors.        []  Missed doses   []  Emergency Room Visit    []  Medication changes  []  Hospitalization   [x]  Diet changes   [x]  Acute illness   []  Activity changes      Symptoms of hypoglycemia    []  None    []  Shakiness    [x]  Lightheadedness or dizziness   []  Confusion      []  Sweating   [x]  Other wakes up from sleeping       Symptoms of hyperglycemia -.    []  None   [x]  Frequent urination    []  Increased thirst   []  Other    Medication adverse reactions (none due to diabetic medicaitons)   [x]  None   []  Diarrhea / Nausea / Vomiting / Constipation / flatulence   []  Hypertension   []  Peripheral edema     []  Signs of infection   []  Headache   []  Vision changes   []  Increased cholesterol    []  Weight gain   []  Change in renal function   []  Increased potassium  []  Other          If recent hospital admission / ED visit, was this related to Diabetes No:Chest pain      Objective     PMHx:    Past Medical History:   Diagnosis Date    Acute kidney injury superimposed on CKD ([address] 4/10/2013    Acute on chronic congestive heart failure ([address]     Acute on chronic kidney failure ([address] 07/20/2017    Acute on chronic respiratory failure ([address] 10/02/2018    Acute on chronic respiratory failure with hypoxia ([address] 9/30/2021    Adhesive capsulitis of left shoulder 03/25/2017    Anemia, normocytic normochromic 9/12/2021    Anxiety 10/02/2016    smokes marijuana for this    Arthropathy, unspecified, other specified sites 06/13/2013    Asthma     B12 deficiency     Bilateral lower leg cellulitis 02/17/2016    Blood in stool     CAD (coronary artery disease)     Cardiovascular stress test abnormal 2018    Cellulitis of both lower extremities 05/25/2017    Cellulitis of leg, left 07/20/2017    CHF (congestive heart failure), NYHA class III ([address] 08/14/2013    Chronic back pain     Chronic bronchitis (HCC)     Chronic headaches     was referred to neuro, testing scheduled    Chronic infective otitis externa 4/28/2017    Chronic kidney disease     Chronic malignant otitis externa of both ears 7/24/2020    Chronic respiratory failure (HCC)     was on vent    Chronic ulcer of left leg, with fat layer exposed ([address] 02/22/2019    healed    Class 2 severe obesity due to excess calories with serious comorbidity and body mass index (BMI) of 35.0 to 35.9 in adult (HCC)     (BMI 35.0-39.9 without comorbidity)    COPD exacerbation ([address] 11/02/2016    Diabetic neuropathy ([address] 08/14/2013    Displacement of lumbar intervertebral disc without myelopathy 06/13/2013    Ear infection     RIGHT    Elevated troponin     Essential hypertension     Facial cellulitis 2012    Fall 03/25/2017    GERD (gastroesophageal reflux disease)     Head injury     Hearing loss in right ear     pencil pierced ear as a child    Hepatic steatosis 12/03/2015    History of general anesthesia complication     has woke up during surgery under anesthesia    History of rib fracture 12/03/2015    Chronic     Hyperlipidemia     Hypersomnia     can go multiple days without sleeping    Hypertension     Insomnia     Intolerance of continuous positive airway pressure (CPAP) ventilation 07/20/2017    Iron deficiency     Localized rash     gets frequently in axilla, groin, in any fold, on several topical treatments for this    Lymphedema of both lower extremities 4/27/2021    Magnesium deficiency     Mastoiditis of left side     Mixed conductive and sensorineural hearing loss of both ears 01/10/2017    Per ENT    Mixed type COPD (chronic obstructive pulmonary disease) ([address]     On home O2, multiple inhlaers, nebulizer    Moderate recurrent major depression ([address] 10/02/2016    Morbid obesity with BMI of 45.0-49.9, adult ([address] 2015    NSTEMI (non-ST elevated myocardial infarction) ([address]     On home oxygen therapy     3 Lpm prn    Open wound of groin 2018    healed     BARBY on CPAP     Osteoarthritis     Otitis externa of left ear     Pancreatitis chronic     Persistent depressive disorder 2019    Renal insufficiency     proteinuria    Seizures ([address] 2019    Severe depression ([address] 2013    Spinal stenosis of lumbar region without neurogenic claudication 2016    MRI lumbar 12/30/15 L3-L4: There is broad-based bulging disc which appears protruding left laterally causing flattening of the ventral thecal sac. In addition, there is facet arthropathy with mild hypertrophic changes. There is borderline central canal stenosis with  evidence of moderate left neural foraminal narrowing and mild right neural foramina narrowing.    L4-L5: There is broad-based protrud    Syncope 2017    Tinnitus of both ears 01/10/2017    Per ENT    Type 2 diabetes mellitus with stage 3 chronic kidney disease, with long-term current use of insulin ([address] 2016    due to underlying condition with hyperosmolarity without coma    Type II or unspecified type diabetes mellitus without mention of complication, not stated as uncontrolled     uncontrolled    Uncontrolled type 2 diabetes mellitus with hyperglycemia ([address] 7/15/2013    Unstable angina ([facility] 2021    Vitamin D deficiency     Wears glasses     for reading       Social History:    Social History     Tobacco Use    Smoking status: Former     Packs/day: 0.50     Years: 33.00     Pack years: 16.50     Types: Cigarettes     Start date: 1985     Quit date: 2020     Years since quittin.0    Smokeless tobacco: Former     Types: Snuff     Quit date: 6/22/1995    Tobacco comments:     Quit smoking again 8/15/2022   Substance Use Topics    Alcohol use: No     Alcohol/week: 0.0 standard drinks       Pertinent Labs:    Lab Results   Component Value Date    LABA1C 8.8 09/26/2022    LABA1C 7.8 (H) 06/06/2022    LABA1C 8.8 02/24/2022    LABA1C 8.8 02/24/2022     Lab Results   Component Value Date    CHOL 151 09/26/2022    TRIG 336 (H) 09/26/2022    HDL 33 (L) 09/26/2022     Lab Results   Component Value Date    CREATININE 1.54 (H) 10/14/2022    BUN 41 (H) 10/14/2022     10/14/2022    K 3.6 (L) 10/14/2022    CL 98 10/14/2022    CO2 29 10/14/2022     Lab Results   Component Value Date/Time    ALT 12 10/14/2022 11:46 AM         Wt Readings from Last 3 Encounters:   11/01/22 (!) 431 lb (195.5 kg)   11/01/22 (!) 431 lb (195.5 kg)   10/18/22 (!) 430 lb 1.6 oz (195.1 kg)       Current medications:  Prior to Admission medications    Medication Sig Start Date End Date Taking? Authorizing Provider   Insulin Syringe-Needle U-100 (BD INSULIN SYRINGE U/F) 31G X 5/16\" 1 ML MISC USE TO SUBCUTANEOUSLY INJECT INSULIN THREE TIMES A DAY 11/7/22   Claria Mcardle, MD   tiZANidine (ZANAFLEX) 4 MG tablet Take 1 tablet by mouth every 8 hours as needed (Back pain and muscle spasms) 10/31/22   Claria Mcardle, MD   oxyCODONE HCl (OXY-IR) 10 MG immediate release tablet Take 1 tablet by mouth every 8 hours as needed for Pain for up to 30 days. 10/27/22 11/26/22  Claria Mcardle, MD   vitamin B-12 (CYANOCOBALAMIN) 500 MCG tablet Take 1 tablet by mouth daily 10/24/22   Claria Mcardle, MD   ketoconazole (NIZORAL) 2 % cream Apply twice a day for yeast infection in the skin folds, for 4 weeks 10/24/22   Claria Mcardle, MD   clobetasol (TEMOVATE) 0.05 % ointment Apply topically 2 times daily for psoriasis 10/24/22   Claria Mcardle, MD   ammonium lactate (LAC-HYDRIN) 12 % lotion Apply topically daily.  10/24/22   Paige MD Manny   nystatin (MYCOSTATIN) 837159 UNIT/GM powder Apply 2 times daily in the skin folds for long term. 10/24/22   Mary Hoskins MD   docusate sodium (COLACE) 100 MG capsule Take 1 capsule by mouth 2 times daily For constipation 10/10/22   Mary Hoskins MD   nystatin (MYCOSTATIN) 977107 UNIT/ML suspension Take 5 mLs by mouth 4 times daily Swish and swallow 10/4/22   Mary Hoskins MD   bumetanide (BUMEX) 1 MG tablet Take 1 tablet by mouth 2 times daily Dose decreased 9/26/2022 due to acute kidney injury 9/26/22   Mary Hoskins MD   allopurinol (ZYLOPRIM) 300 MG tablet Take 1 tablet by mouth daily For gout, stop medication if any rash develops 9/26/22   Mary Hoskins MD   metOLazone (ZAROXOLYN) 2.5 MG tablet Take 1 tablet by mouth daily Take 30 mins before am Bumex dose 9/14/22   Amanda Lainez MD   potassium chloride (KLOR-CON M) 20 MEQ extended release tablet Take 1 tablet by mouth 2 times daily 9/14/22   Amanda Lainez MD   vitamin D3 (CHOLECALCIFEROL) 25 MCG (1000 UT) TABS tablet TAKE ONE TABLET BY MOUTH DAILY 9/6/22   Mary Hoskins MD   varenicline (CHANTIX) 1 MG tablet Take 1 mg by mouth 2 times daily  Patient not taking: No sig reported    Historical Provider, MD   blood glucose monitor strips Test 3 times a day & as needed for symptoms of irregular blood glucose. Dispense sufficient amount for indicated testing frequency plus additional to accommodate PRN testing needs. One touch Ultra blue. To be used if Chew not working or to double check sugars.  9/6/22   Mary Hoskins MD   amitriptyline (ELAVIL) 50 MG tablet TAKE ONE TABLET BY MOUTH EVERY NIGHT AT BEDTIME 9/1/22   Anitra Jonas MD   Respiratory Therapy Supplies (NEBULIZER/TUBING/MOUTHPIECE) KIT Dx COPD needs nebulizer supplies 8/22/22   Mary Hoskins MD   pantoprazole (PROTONIX) 40 MG tablet Take 1 tablet by mouth every morning (before breakfast) 8/1/22   Mary Hoskins MD insulin regular human (HUMULIN R) 500 UNIT/ML concentrated injection vial Patient using 0.60ml with breakfast, 0.50 ml with lunch and 0.25 ml with dinner. Patient taking differently: Patient using 0.65ml with breakfast, 0.50 ml with dinner and 0.25 ml with evening snack.  7/28/22   Rene Cueva MD   Continuous Blood Gluc Sensor (FREESTYLE CRISTINE 14 DAY SENSOR) [facility] USE AS DIRECTED TO MONITOR BLOOD SUGAR AND CHANGE EVERY 14 DAYS 7/28/22   Rene Cueva MD   gabapentin (NEURONTIN) 300 MG capsule TAKE ONE CAPSULE BY MOUTH THREE TIMES A DAY 7/19/22 11/1/22  Gill Barahona MD   albuterol (PROVENTIL) (2.5 MG/3ML) 0.083% nebulizer solution USE THREE MILLILITERS ( ONE VIAL) VIA NEBULIZATION BY MOUTH EVERY 6 HOURS AS NEEDED FOR WHEEZING OR FOR SHORTNESS OF BREATH 7/11/22   Paige Bryant MD   albuterol sulfate  (90 Base) MCG/ACT inhaler INHALE TWO PUFFS BY MOUTH EVERY 6 HOURS AS NEEDED FOR WHEEZING OR FOR SHORTNESS OF BREATH 6/6/22   Rene Cueva MD   rosuvastatin (CRESTOR) 10 MG tablet Take 1 tablet by mouth nightly Stop pravastatin 5/9/22   Rene Cueva MD   chlorhexidine (HIBICLENS) 4 % external liquid Use once or twice a day to clean the left armpit, diluted in water and wash the left armpit with it 3/2/22   Rene Cueva MD   venlafaxine (EFFEXOR XR) 150 MG extended release capsule Take 1 capsule by mouth every morning Dose increased 3/2/2022 3/2/22   Rene Cueva MD   naloxone 4 MG/0.1ML LIQD nasal spray 1 spray by Nasal route as needed for Opioid Reversal Patient needs counseling 2/27/22   Rene Cueva MD   butalbital-acetaminophen-caffeine (FIORICET, ESGIC) -40 MG per tablet Take 1 tablet by mouth every 8 hours as needed for Headaches 2/11/22   Gill Barahona MD   MAGNESIUM-OXIDE 400 (241.3 Mg) MG TABS tablet Take 1 tablet by mouth 2 times daily Frequency increased to 10/20/2022 2/10/22   MD LATANYA Vela 325 (65 Fe) MG tablet TAKE ONE TABLET BY MOUTH DAILY 12/1/21   Bettie Lowery MD   SPIRIVA RESPIMAT 2.5 MCG/ACT AERS inhaler INHALE TWO PUFFS BY MOUTH DAILY 12/1/21   Bettie Lowery MD   ADVAIR -89 MCG/ACT inhaler INHALE TWO PUFFS BY MOUTH TWICE A DAY 12/1/21   Bettie Lowery MD   metoprolol tartrate (LOPRESSOR) 50 MG tablet Take 1 tablet by mouth 2 times daily 11/17/21   Bettie Lowery MD   MUCUS RELIEF 600 MG extended release tablet  11/8/21   Historical Provider, MD Roe Chaparor 0.02-0.005 % SOLN  10/7/21   Historical Provider, MD   epoetin leslie-epbx (RETACRIT) 3000 UNIT/ML SOLN injection Inject 1 mL into the skin three times a week 10/4/21   Violette Sierra MD   latanoprost (XALATAN) 0.005 % ophthalmic solution 1 drop nightly    Historical Provider, MD   clopidogrel (PLAVIX) 75 MG tablet Take 1 tablet by mouth daily 8/11/21   Bettie Lowery MD   nitroGLYCERIN (NITROSTAT) 0.4 MG SL tablet DISSOLVE 1 TAB UNDER TONGUE FOR CHEST PAIN - IF PAIN REMAINS AFTER 5 MIN, CALL 911 AND REPEAT DOSE. MAX 3 TABS IN 15 MINUTES  Patient not taking: Reported on 11/1/2022 8/2/21   Bettie Lowery MD   Lancets MISC Use to check blood sugar three times daily along with when necessary due to symptoms. 9/30/20   Emma Green MD   Oxygen Tubing MISC by Does not apply route DX COPD. chronic respiratory failure 3/26/20   Bettie Lowery MD   ONE TOUCH ULTRASOFT LANCETS MISC Patient to test blood sugar up to 4 times daily. 11/7/19   Emma Green MD   Lancet Devices (LANCING DEVICE) MISC Provide patient with lancing device appropriate for his machine/lancing needles. 6/4/19   Bettie Lowery MD   Handicap Placard MISC by Does not apply route Can't walk greater than 200 feet. Expires in 5 years.  2/13/19   Paige Bryant MD   fluticasone (CUTIVATE) 0.05 % cream Apply topically 2 times daily  4/19/17   Historical Provider, MD   fluticasone (FLONASE) 50 MCG/ACT nasal spray 2 sprays by Nasal route daily  Patient taking differently: 2 sprays by Nasal route daily as needed (sinus symptoms) 1/16/17   Wandy Mosley MD   Melatonin 10 MG TABS Take 10 mg by mouth nightly as needed (insomnia) 12/23/16   Wandy Mosley MD   aspirin 81 MG EC tablet Take 81 mg by mouth daily. Historical Provider, MD       Immunizations:   Most Recent Immunizations   Administered Date(s) Administered    COVID-19, MODERNA BLUE border, Primary or Immunocompromised, (age 12y+), IM, 100 mcg/0.5mL 12/15/2021    COVID-19, PFIZER GRAY top, DO NOT Dilute, (age 15 y+), IM, 30 mcg/0.3 mL 04/23/2022    DT (pediatric) 12/14/1998    Hepatitis B Adult (Engerix-B) 12/04/2019    Hepatitis B Adult (Recombivax HB) 12/04/2019    Influenza Virus Vaccine 10/12/2015    Influenza, FLUARIX, FLULAVAL, FLUZONE (age 10 mo+) AND AFLURIA, (age 1 y+), PF, 0.5mL 10/09/2020    Influenza, FLUCELVAX, (age 10 mo+), MDCK, PF, 0.5mL 10/11/2022    Pneumococcal Polysaccharide (Eogaswffs55) 05/23/2013    Pneumococcal conjugate PCV20, PF (Prevnar 20) 04/23/2022    Tdap (Boostrix, Adacel) 09/12/2018    Zoster Recombinant (Shingrix) 02/14/2022       Home Blood Glucose Results:   Per patient's Freestyle Lianne report below                 Assessment     A1c at goal:No: 8.8 (9/26/22) POC at endocrinology office. Blood Pressure at goal: Yes  Weight at goal: No:    Physical activity: No  Physical activity at goal: No    Smoking Cessation: Yes    Cholesterol at target: Yes  Annual eye exam completed: Yes  Comprehensive Foot Exam Completed:  Yes  Annual urine albumin and serum creatinine: Yes    Statin: Yes    Appropriate?: Yes  Changes made: refill provided    ACE/ARB: Yes  Appropriate?: Yes  Changes made:     Aspirin: Yes  Appropriate?: Yes  Changes made:     Eating patterns:    [x]  My plate    []  Mediterranean diet   []  Low sodium   []  DASH diet   [x]  Portion control   [x]  Reduced calorie    []  Fast food / Restaurant  []  High glycemic index foods   []  Sugary beverages   []  Other Comment: see above    Current Medications Affecting Diabetes:  Humulin R 500    Compliant:No  Barriers to medication therapy: anxiety / depression    Medications attempted in the past:  Metformin - cardiologist took him off but patient unsure why  Januvia - PCP took him off but patient unsure why    Recent exacerbations / new problems:  Gout / Chest pain/pressure    Last office dictation reviewed: yes        type 2 DM under worsening control as evidenced by blood sugars on Lianne and elevating A1c. Plan   Counseling at today's visit:     -Continued monitoring of blood glucose with use of Freestyle Lianne. Call with any issues with sensor. Bring data cord to each visit.      -Continue taking insulin on regular basis:  Continue dose at insulin at .70 at breakfast time, 50 at lunch time and .25 with dinner. Patient is encouraged to not skip meals (lunch) and to take insulin at beginning of meals. Resume some exercise as able. Physician Follow-up:   Dr Sloane Soto - Nov 21st    Medication Management Follow-up:   Diabetes Service   1 month    Electronically signed by Alma Blanca RPH on 11/15/2022 at 9:30 [facility] in [facility]:  Yes  Recommendation Provided To: Patient/Caregiver: 4 via In person  Intervention Detail: Dose Adjustment: 1, reason: Therapy Optimization, New Rx: 1, reason: Needs Additional Therapy, Refill(s) Provided and Scheduled Appointment  Intervention Accepted By: Patient/Caregiver: 4  Time Spent (min): 45     Yesenia Rizvi RP,Pharm. D,, BCPS, CACP  11/15/2022  9:30 AM

## 2022-11-15 NOTE — PROGRESS NOTES
Diagnosis Orders   1. Hypokalemia  Basic Metabolic Panel    Magnesium      2.  Chronic diastolic congestive heart failure [facility])  Basic Metabolic Panel           Future Appointments   Date Time Provider William Hernandez   12/14/2022  9:10 AM [facility] MEDICATION MGMT [facility]   1/16/2023 11:00 AM Janiya Oates MD [facility]   3/7/2023  9:00 AM Yue Lynn MD  [facility]   5/9/2023  9:00 AM Jc Tipton MD [facility] [place]

## 2022-11-15 NOTE — DISCHARGE INSTRUCTIONS
Take insulin at beginning of your meal - before you start eating. Do not skip lunch. Try to keep easy things like meal replacement shakes or peanut butter sandwiches in refrigerator to eat when you do not want to cook. Continue your insulin at  0.70 with breakfast; 0.50 with lunch and 0.25 with dinner.

## 2022-11-18 ENCOUNTER — TELEPHONE (OUTPATIENT)
Dept: FAMILY MEDICINE CLINIC | Age: 57
End: 2022-11-18

## 2022-11-18 DIAGNOSIS — E87.6 HYPOKALEMIA: Primary | ICD-10-CM

## 2022-11-18 DIAGNOSIS — I10 ESSENTIAL HYPERTENSION: ICD-10-CM

## 2022-11-18 DIAGNOSIS — I50.32 CHRONIC DIASTOLIC CONGESTIVE HEART FAILURE (HCC): ICD-10-CM

## 2022-11-18 DIAGNOSIS — F33.2 MDD (MAJOR DEPRESSIVE DISORDER), RECURRENT SEVERE, WITHOUT PSYCHOSIS (HCC): ICD-10-CM

## 2022-11-18 DIAGNOSIS — E83.42 HYPOMAGNESEMIA: ICD-10-CM

## 2022-11-18 DIAGNOSIS — E87.6 HYPOKALEMIA: ICD-10-CM

## 2022-11-18 DIAGNOSIS — F41.9 ANXIETY: ICD-10-CM

## 2022-11-18 RX ORDER — POTASSIUM CHLORIDE 20MEQ/15ML
20 LIQUID (ML) ORAL 2 TIMES DAILY
Qty: 900 ML | Refills: 4 | Status: SHIPPED | OUTPATIENT
Start: 2022-11-18

## 2022-11-19 NOTE — TELEPHONE ENCOUNTER
Intrinsic-ID message sent to the patient that I have sent potassium solution to the pharmacy    Please let the patient know on Monday  Please let the patient know to  prescription from the pharmacy. Orders Placed This Encounter   Medications    potassium chloride 20 MEQ/15ML (10%) oral solution     Sig: Take 15 mLs by mouth 2 times daily Take with Bumex, replacing tablets     Dispense:  900 mL     Refill:  4         [facility] [id number] [place], [place] - [address] [phone] - F [phone]  [facility] [place] [id number]  Phone: [phone] Fax: [phone]      No orders of the defined types were placed in this encounter. Future Appointments   Date Time Provider William Hernandez   12/14/2022  9:10 AM [facility] MEDICATION MGMT [address] [facility]   1/16/2023 11:00 AM Rhea Salas MD [facility]   3/7/2023  9:00 AM Elle Lamb MD [facility]   5/9/2023  9:00 AM Christine Reyes MD  [facility]       Thank you! Sean Chan, Reyna Madison MD  Hello,   I was seeing Mr. Rosalia Kruger for diabetes today and saw that his potassium resulted low at 3.3. Patient admits he is having a very hard time swallowing the potassium tablets and admits he sometimes omits a dose. Discussed different available dosing forms and patient would prefer to have a oral solution. I called his [address] on [address] and they do have the Potassium chloride oral soln 20 meq/15ml. Patient  would appreciate if you could send an RX for this to the pharmacy. I did speak with he about the importance of keeping potassium in normal range. Yesenia Rizvi RP,Pharm. D,, BCPS, CACP   11/15/2022   10:32 AM      Diagnosis Orders   1.  Hypokalemia  potassium chloride 20 MEQ/15ML (10%) oral solution           Future Appointments   Date Time Provider William Hernandez   12/14/2022  9:10 AM [facility] MEDICATION MGMT [facility]   1/16/2023 11:00 AM Lenka Dong MD [facility]   3/7/2023  9:00 AM [facility]   5/9/2023  9:00 AM Rae Montoya MD  [facility]

## 2022-11-21 RX ORDER — METOPROLOL TARTRATE 50 MG/1
TABLET, FILM COATED ORAL
Qty: 180 TABLET | Refills: 3 | Status: SHIPPED | OUTPATIENT
Start: 2022-11-21

## 2022-11-21 RX ORDER — VENLAFAXINE HYDROCHLORIDE 150 MG/1
150 CAPSULE, EXTENDED RELEASE ORAL EVERY MORNING
Qty: 90 CAPSULE | Refills: 2 | Status: SHIPPED | OUTPATIENT
Start: 2022-11-21

## 2022-11-21 RX ORDER — LANOLIN ALCOHOL/MO/W.PET/CERES
400 CREAM (GRAM) TOPICAL 2 TIMES DAILY
Qty: 180 TABLET | Refills: 3 | Status: SHIPPED | OUTPATIENT
Start: 2022-11-21

## 2022-11-22 DIAGNOSIS — M48.061 SPINAL STENOSIS OF LUMBAR REGION WITHOUT NEUROGENIC CLAUDICATION: ICD-10-CM

## 2022-11-22 DIAGNOSIS — M96.1 POSTLAMINECTOMY SYNDROME OF LUMBAR REGION: ICD-10-CM

## 2022-11-22 DIAGNOSIS — M51.36 DDD (DEGENERATIVE DISC DISEASE), LUMBAR: ICD-10-CM

## 2022-11-22 DIAGNOSIS — G89.29 CHRONIC MIDLINE LOW BACK PAIN WITH LEFT-SIDED SCIATICA: ICD-10-CM

## 2022-11-22 DIAGNOSIS — M54.42 CHRONIC MIDLINE LOW BACK PAIN WITH LEFT-SIDED SCIATICA: ICD-10-CM

## 2022-11-22 RX ORDER — OXYCODONE HYDROCHLORIDE 10 MG/1
10 TABLET ORAL EVERY 8 HOURS PRN
Qty: 90 TABLET | Refills: 0 | Status: SHIPPED | OUTPATIENT
Start: 2022-11-22 | End: 2022-12-22

## 2022-12-03 DIAGNOSIS — D50.0 IRON DEFICIENCY ANEMIA DUE TO CHRONIC BLOOD LOSS: ICD-10-CM

## 2022-12-05 RX ORDER — FERROUS SULFATE 325(65) MG
TABLET ORAL
Qty: 90 TABLET | Refills: 3 | Status: SHIPPED | OUTPATIENT
Start: 2022-12-05

## 2022-12-13 ENCOUNTER — TELEPHONE (OUTPATIENT)
Dept: PHARMACY | Age: 57
End: 2022-12-13

## 2022-12-13 RX ORDER — INSULIN HUMAN 500 [IU]/ML
INJECTION, SOLUTION SUBCUTANEOUS
Qty: 15 EACH | Refills: 2 | Status: SHIPPED | OUTPATIENT
Start: 2022-12-13

## 2022-12-13 NOTE — TELEPHONE ENCOUNTER
Patient is down to last vial of Humulin R U-500 vials. Vials are currently on backorder and unavailable. Patient will switch to Humulin R U-500 kwikpens. Called [facility] [address] and confirmed they are able to order. RX for Humulin R U-500 sent to [facility] [address]. #15 pens with 2 refills. Spent several minutes discussing dosing of vials vs pens with daughter. Spoke to patient's daughter who indicates patient is not feeling well. Came down with fever, aches, and cough yesterday. Patient's niece has been positive for both influenza A and B in last week. Patient did get flu vaccine. Patient is wearing [employer]estyle [employer] and daughter confirms new sensor was just started yesterday and working well. Stressed need to monitor blood sugar closely with current illness. Patient's daughter also reports patient's nebulilzer is no longer working but has been using patient's granddaughter's nebulizer instead. Has called provider and daughter reports Dr. Amy Alexis has sent over order for replacement from [address] [place] [address]. Waiting for call from [facility] per daughter. Due to patient's illness, patient is unable to come to appt for diabetes medication management tomorrow 12/14. Appt rescheduled to 1/3/22. Yesenia Rizvi RPH,Pharm. D,, BCPS, CACP  12/13/2022  9:31 AM

## 2022-12-16 ENCOUNTER — TELEPHONE (OUTPATIENT)
Dept: PHARMACY | Age: 57
End: 2022-12-16

## 2022-12-16 DIAGNOSIS — J44.9 CHRONIC OBSTRUCTIVE PULMONARY DISEASE, UNSPECIFIED COPD TYPE (HCC): ICD-10-CM

## 2022-12-16 RX ORDER — FLUTICASONE PROPIONATE AND SALMETEROL XINAFOATE 230; 21 UG/1; UG/1
AEROSOL, METERED RESPIRATORY (INHALATION)
Qty: 36 G | Refills: 5 | Status: SHIPPED | OUTPATIENT
Start: 2022-12-16

## 2022-12-16 RX ORDER — PEN NEEDLE, DIABETIC 31 G X1/4"
NEEDLE, DISPOSABLE MISCELLANEOUS
Qty: 100 EACH | Refills: 3 | Status: SHIPPED | OUTPATIENT
Start: 2022-12-16

## 2022-12-16 NOTE — TELEPHONE ENCOUNTER
Patient's daughter called to request pen needles for Humulin Kwikpen to be called in to pharmacy. #100 pen needles Rx sent to pharmacy.     Vianey Dowd RPH,PharmD, BCACP  12/16/2022  10:38 AM

## 2022-12-17 ENCOUNTER — PATIENT MESSAGE (OUTPATIENT)
Dept: FAMILY MEDICINE CLINIC | Age: 57
End: 2022-12-17

## 2022-12-17 DIAGNOSIS — I50.32 CHRONIC DIASTOLIC CONGESTIVE HEART FAILURE (HCC): Primary | ICD-10-CM

## 2022-12-19 RX ORDER — POTASSIUM CHLORIDE 750 MG/1
20 TABLET, FILM COATED, EXTENDED RELEASE ORAL 2 TIMES DAILY
Qty: 360 TABLET | Refills: 4 | Status: SHIPPED
Start: 2022-12-19 | End: 2022-12-19

## 2022-12-19 NOTE — TELEPHONE ENCOUNTER
From: Crystal Standard. To: Dr. Monaco Judith: 12/17/2022 3:48 PM EST  Subject: Potassium     Can you please call in the small potassium pills for Miguel to [facility] [address] he can take those with no issues .  Thank you

## 2022-12-20 DIAGNOSIS — J44.9 CHRONIC OBSTRUCTIVE PULMONARY DISEASE, UNSPECIFIED COPD TYPE (HCC): ICD-10-CM

## 2022-12-20 RX ORDER — ALBUTEROL SULFATE 90 UG/1
2 AEROSOL, METERED RESPIRATORY (INHALATION) EVERY 6 HOURS PRN
Qty: 8.5 G | Refills: 3 | Status: SHIPPED | OUTPATIENT
Start: 2022-12-20

## 2022-12-21 DIAGNOSIS — J44.9 CHRONIC OBSTRUCTIVE PULMONARY DISEASE, UNSPECIFIED COPD TYPE (HCC): ICD-10-CM

## 2022-12-21 RX ORDER — FLUTICASONE PROPIONATE AND SALMETEROL XINAFOATE 230; 21 UG/1; UG/1
AEROSOL, METERED RESPIRATORY (INHALATION)
Qty: 36 G | Refills: 5 | Status: SHIPPED | OUTPATIENT
Start: 2022-12-21

## 2022-12-26 ENCOUNTER — PATIENT MESSAGE (OUTPATIENT)
Dept: FAMILY MEDICINE CLINIC | Age: 57
End: 2022-12-26

## 2022-12-26 DIAGNOSIS — G89.29 CHRONIC MIDLINE LOW BACK PAIN WITH LEFT-SIDED SCIATICA: ICD-10-CM

## 2022-12-26 DIAGNOSIS — E11.42 TYPE 2 DIABETES MELLITUS WITH DIABETIC POLYNEUROPATHY, WITH LONG-TERM CURRENT USE OF INSULIN (HCC): Primary | ICD-10-CM

## 2022-12-26 DIAGNOSIS — M48.061 SPINAL STENOSIS OF LUMBAR REGION WITHOUT NEUROGENIC CLAUDICATION: ICD-10-CM

## 2022-12-26 DIAGNOSIS — M54.42 CHRONIC MIDLINE LOW BACK PAIN WITH LEFT-SIDED SCIATICA: ICD-10-CM

## 2022-12-26 DIAGNOSIS — M51.36 DDD (DEGENERATIVE DISC DISEASE), LUMBAR: ICD-10-CM

## 2022-12-26 DIAGNOSIS — M96.1 POSTLAMINECTOMY SYNDROME OF LUMBAR REGION: ICD-10-CM

## 2022-12-26 DIAGNOSIS — I50.32 CHRONIC DIASTOLIC CONGESTIVE HEART FAILURE (HCC): ICD-10-CM

## 2022-12-26 DIAGNOSIS — Z79.4 TYPE 2 DIABETES MELLITUS WITH DIABETIC POLYNEUROPATHY, WITH LONG-TERM CURRENT USE OF INSULIN (HCC): Primary | ICD-10-CM

## 2022-12-27 RX ORDER — GABAPENTIN 300 MG/1
CAPSULE ORAL
Qty: 90 CAPSULE | Refills: 1 | Status: SHIPPED | OUTPATIENT
Start: 2022-12-27 | End: 2023-04-07

## 2022-12-27 RX ORDER — OXYCODONE HYDROCHLORIDE 10 MG/1
10 TABLET ORAL EVERY 8 HOURS PRN
Qty: 90 TABLET | Refills: 0 | Status: SHIPPED | OUTPATIENT
Start: 2022-12-27 | End: 2023-01-26

## 2022-12-27 NOTE — TELEPHONE ENCOUNTER
From: Harpreet Brandt.   To: Dr. Knight Rummonicof: 12/26/2022 2:28 PM EST  Subject: Meds     Can you please refill my oxyCODONE 10 mg one every 8 hours I will be out today

## 2022-12-27 NOTE — TELEPHONE ENCOUNTER
Pharmacy requesting refill of Gabapentin 300 mg. Medication active on med list yes      Date of last Rx: 07/19/22 with 2 refills          verified by SRGLORIA      Date of last appointment 02/11/22    Next Visit Date: None, message left to schedule follow up appointment.

## 2023-01-02 DIAGNOSIS — E83.42 HYPOMAGNESEMIA: ICD-10-CM

## 2023-01-02 DIAGNOSIS — J44.9 CHRONIC OBSTRUCTIVE PULMONARY DISEASE, UNSPECIFIED COPD TYPE (HCC): ICD-10-CM

## 2023-01-02 DIAGNOSIS — K21.9 GASTROESOPHAGEAL REFLUX DISEASE WITHOUT ESOPHAGITIS: ICD-10-CM

## 2023-01-02 DIAGNOSIS — E87.6 HYPOKALEMIA: ICD-10-CM

## 2023-01-03 ENCOUNTER — HOSPITAL ENCOUNTER (OUTPATIENT)
Dept: PHARMACY | Age: 58
Setting detail: THERAPIES SERIES
Discharge: HOME | End: 2023-01-03
Payer: COMMERCIAL

## 2023-01-03 VITALS — HEART RATE: 65 BPM | DIASTOLIC BLOOD PRESSURE: 56 MMHG | SYSTOLIC BLOOD PRESSURE: 109 MMHG

## 2023-01-03 PROCEDURE — 99213 OFFICE O/P EST LOW 20 MIN: CPT

## 2023-01-03 RX ORDER — LANOLIN ALCOHOL/MO/W.PET/CERES
400 CREAM (GRAM) TOPICAL 2 TIMES DAILY
Qty: 180 TABLET | Refills: 3 | Status: SHIPPED | OUTPATIENT
Start: 2023-01-03

## 2023-01-03 RX ORDER — INSULIN HUMAN 500 [IU]/ML
INJECTION, SOLUTION SUBCUTANEOUS SEE ADMIN INSTRUCTIONS
COMMUNITY

## 2023-01-03 RX ORDER — PANTOPRAZOLE SODIUM 40 MG/1
40 TABLET, DELAYED RELEASE ORAL
Qty: 90 TABLET | Refills: 1 | Status: SHIPPED | OUTPATIENT
Start: 2023-01-03

## 2023-01-03 RX ORDER — ALBUTEROL SULFATE 90 UG/1
2 AEROSOL, METERED RESPIRATORY (INHALATION) EVERY 6 HOURS PRN
Qty: 8.5 G | Refills: 3 | Status: SHIPPED | OUTPATIENT
Start: 2023-01-03

## 2023-01-03 NOTE — PROGRESS NOTES
Diabetic Medication Management   [address] [place] [address] Dr Edith Hopkins. [place], [address] [facility]  Phone: [phone]  Fax: [phone]    NAME: Jacky Carroll. MEDICAL RECORD NUMBER:  [id number]  AGE: 61 y.o. GENDER: male  : 1965  EPISODE DATE:  1/3/2023       Mr. Maddy Sotelo was referred to [facility] by Dr. Jasper Hodges, Special instructions include: titrate all medications (as defined in clinic's policy and procedure)    Patient seen in office. Goals per referral:   Fasting blood glucose: < 130  Peak postprandial glucose: < 180  A1C: < 7    Other goals:  Blood pressure goal: 130/80  Weight loss goal (~10%): Target weight  410 to be reached by date: 2023 (3-6 months)  Physical Activity goal:    Discussed but nothing formal at this time and no specific goals set  Smoking Cessation   Quit Date: Not currently ready    Cholesterol at target:   Date: Yes  22  Annual eye exam:    Date: 2022  Comprehensive annual foot exam:   Date:  2022  Annual urine Albumin and serum creatinine:   Date:  microalbumin <12 mg/L (3/2/22), SrCr 1.54mg/dL (10/1422) eGFR 52 ml/min        Subjective   Mr. Maddy Sotelo is a 61 y.o. male here for the Diabetes Service for self-management education, medication review including over the counter medications and herbal products, overall well-being assessment, transition of care and any needed adjustments with updates and recommendations communicated to the referring physician. Patient's name and  verified. Patient Findings:    States Humulin R U-500 insulin pens did not work to control blood sugar. States he was using as instructed but blood sugar kept rising. Was able to get Humulin R U-500 vials about a week ago and states blood sugars have been better. Review of Freestyle Lianne data still shows elevated blood sugars (see below).       Asked patient to bring in vials with syringes to next appt so he can show us how he is using his Humulin R U-500 to make sure we understand and know exactly what dose of insulin he is taking. Since Nicole has had hard time due to anniversary of sister and niece death. Emotionally very difficult time which he states is making his blood sugar go higher. Patient also has been arguing with wife due to her being home more from work for holidays. She goes back to work today so hope this improves. Asked if patient would consider counseling and he is not interested at this time. Currently using Humulin R U-500 at dose of  0.70ml with breakfast, 0.55ml with lunch, 0.2ml with dinner. Notes when blood sugar above 200 at dinner has been sometimes increasing to 0.25ml. States he is not doing this very often. Does admit to not having the best dietary habits. Daughters making homemade cookies and candies. No more left other than a few buckeyes. Patient also reports snacking on Doritos at times. Encouraged patient to limit splurging / sugary snacks and to have healthier snacks available. Patient reports he does not have typical meal for lunch most days. Does not normally skip eating all together but may only have peanut butter crackers or Doritos. Stated it is better to have snack rather than skip meal all together but encouraged healthier choices such as the peanut butter crackers being better than Doritos. Only one episode of hypoglycemia on 12/22 when blood sugar went down to 60's in middle of night otherwise less hypoglycemia than in past.    Has rock candy for if blood sugar goes low. States this works better than OJ at raising blood sugar. Asked patient to be aware and make sure it does not cause blood sugar to spike. Goes to Dr. Vivek Hay office tomorrow and will see Tammi Iyer who will do A1c. Asked patient to inform us of any changes made to his diabetes therapy. States he saw Dr. Vivek Hay on 11/21 and was told Dr. Vivek Hay was happy with his glucose control.      Patient up to date with all lab work. Has lab work on file in [facility] from PCP and nephrologist.  Plans to do this blood work on 1/30/23 in prep for appt on 2/6/23. Still using non-invasive vent. Did get filters but now dealing with strap for head gear that needs replaced. Patient has been eating 1/2 [employer] sandwich most days between 2 and 4am.  Review of Freestyle Lianne (see below) shows blood sugar increasing after this time most days but others going low. Instructed patient to give dose of insulin at 0.15ml with [employer] sandwich when blood sugar is 150 or more. Patient did make appt with dermatologist on Jan 16th, 2023    Isaiah Ewing has been smoking every day. Started about a month ago when stress and conflict started increasing at home as states it helps to calm him down. Smoking about 1 ppd. Patient admits this is affecting his breathing but he doesn't care as of now. Knows he can quit when he wants and has Chantix at home. Expressed understanding on current situation but also encouraged patient to quit smoking to improve health. Offered help and asked patient to inform our service when he is ready to attempt smoking cessation again. Still has decreased sensation in right foot per patient. Stable - has not gotten worse or better. Has not been checking bottom of feet as forgot. Provided reminder to do a daily check of his feet and use a mirror if someone what not able to help him. Review of blood glucose data per [facility] for last month(below) shows patient's blood sugars have increased and are still significantly above goal.      Patient has enough insulin and needles. Has enough One touch ultra glucose strips. Has enough lancets. Has enough freestyle sensors.        []  Missed doses   []  Emergency Room Visit    [x]  Medication changes  []  Hospitalization   [x]  Diet changes   []  Acute illness   []  Activity changes      Symptoms of hypoglycemia    []  None    []  Shakiness    [x] Lightheadedness or dizziness   []  Confusion      []  Sweating   [x]  Other wakes up from sleeping       Symptoms of hyperglycemia -.    []  None   [x]  Frequent urination    []  Increased thirst   []  Other    Medication adverse reactions (none due to diabetic medicaitons)   [x]  None   []  Diarrhea / Nausea / Vomiting / Constipation / flatulence   []  Hypertension   []  Peripheral edema     []  Signs of infection   []  Headache   []  Vision changes   []  Increased cholesterol    []  Weight gain   []  Change in renal function   []  Increased potassium  []  Other          If recent hospital admission / ED visit, was this related to Diabetes No:Chest pain      Objective     PMHx:    Past Medical History:   Diagnosis Date    Acute kidney injury superimposed on CKD ([address] 4/10/2013    Acute on chronic congestive heart failure ([address]     Acute on chronic kidney failure ([address] 07/20/2017    Acute on chronic respiratory failure ([address] 10/02/2018    Acute on chronic respiratory failure with hypoxia ([address] 9/30/2021    Adhesive capsulitis of left shoulder 03/25/2017    Anemia, normocytic normochromic 9/12/2021    Anxiety 10/02/2016    smokes marijuana for this    Arthropathy, unspecified, other specified sites 06/13/2013    Asthma     B12 deficiency     Bilateral lower leg cellulitis 02/17/2016    Blood in stool     CAD (coronary artery disease)     Cardiovascular stress test abnormal 2018    Cellulitis of both lower extremities 05/25/2017    Cellulitis of leg, left 07/20/2017    CHF (congestive heart failure), NYHA class III ([address] 08/14/2013    Chronic back pain     Chronic bronchitis (HCC)     Chronic headaches     was referred to neuro, testing scheduled    Chronic infective otitis externa 4/28/2017    Chronic kidney disease     Chronic malignant otitis externa of both ears 7/24/2020    Chronic respiratory failure ([address]     was on vent    Chronic ulcer of left leg, with fat layer exposed ([address] 02/22/2019    healed    Class 2 severe obesity due to excess calories with serious comorbidity and body mass index (BMI) of 35.0 to 35.9 in adult (HCC)     (BMI 35.0-39.9 without comorbidity)    COPD exacerbation (HCC) 11/02/2016    Diabetic neuropathy ([address] 08/14/2013    Displacement of lumbar intervertebral disc without myelopathy 06/13/2013    Ear infection     RIGHT    Elevated troponin     Essential hypertension     Facial cellulitis 2012    Fall 03/25/2017    GERD (gastroesophageal reflux disease)     Head injury     Hearing loss in right ear     pencil pierced ear as a child    Hepatic steatosis 12/03/2015    History of general anesthesia complication     has woke up during surgery under anesthesia    History of rib fracture 12/03/2015    Chronic     Hyperlipidemia     Hypersomnia     can go multiple days without sleeping    Hypertension     Insomnia     Intolerance of continuous positive airway pressure (CPAP) ventilation 07/20/2017    Iron deficiency     Localized rash     gets frequently in axilla, groin, in any fold, on several topical treatments for this    Lymphedema of both lower extremities 4/27/2021    Magnesium deficiency     Mastoiditis of left side     Mixed conductive and sensorineural hearing loss of both ears 01/10/2017    Per ENT    Mixed type COPD (chronic obstructive pulmonary disease) ([address]     On home O2, multiple inhlaers, nebulizer    Moderate recurrent major depression ([address] 10/02/2016    Morbid obesity with BMI of 45.0-49.9, adult ([address] 06/16/2015    NSTEMI (non-ST elevated myocardial infarction) ([address]     On home oxygen therapy     3 Lpm prn    Open wound of groin 12/19/2018    healed     BARBY on CPAP     Osteoarthritis     Otitis externa of left ear     Pancreatitis chronic     Persistent depressive disorder 11/19/2019    Renal insufficiency     proteinuria    Seizures ([address] 6/27/2019    Severe depression ([address] 09/25/2013    Spinal stenosis of lumbar region without neurogenic claudication 01/06/2016    MRI lumbar 12/30/15 L3-L4: There is broad-based bulging disc which appears protruding left laterally causing flattening of the ventral thecal sac. In addition, there is facet arthropathy with mild hypertrophic changes. There is borderline central canal stenosis with  evidence of moderate left neural foraminal narrowing and mild right neural foramina narrowing.   L4-L5: There is broad-based protrud    Syncope 2017    Tinnitus of both ears 01/10/2017    Per ENT    Type 2 diabetes mellitus with stage 3 chronic kidney disease, with long-term current use of insulin ([facility]) 2016    due to underlying condition with hyperosmolarity without coma    Type II or unspecified type diabetes mellitus without mention of complication, not stated as uncontrolled     uncontrolled    Uncontrolled type 2 diabetes mellitus with hyperglycemia ([facility]) 7/15/2013    Unstable angina ([facility]) 2021    Vitamin D deficiency     Wears glasses     for reading       Social History:    Social History     Tobacco Use    Smoking status: Every Day     Packs/day: 1.00     Years: 33.00     Pack years: 33.00     Types: Cigarettes     Start date: 1985     Last attempt to quit: 2020     Years since quittin.1    Smokeless tobacco: Former     Types: Snuff     Quit date: 1995    Tobacco comments:     Quit smoking again 8/15/2022   Substance Use Topics    Alcohol use: No     Alcohol/week: 0.0 standard drinks       Pertinent Labs:    Lab Results   Component Value Date    LABA1C 8.8 2022    LABA1C 7.8 (H) 2022    LABA1C 8.8 2022    LABA1C 8.8 2022     Lab Results   Component Value Date    CHOL 151 2022    TRIG 336 (H) 2022    HDL 33 (L) 2022     Lab Results   Component Value Date    CREATININE 1.58 (H) 11/15/2022    BUN 38 (H) 11/15/2022     11/15/2022    K 3.4 (L) 11/15/2022    CL 97 (L) 11/15/2022    CO2 29 11/15/2022     Lab Results   Component Value Date/Time    ALT 12 10/14/2022 11:46 AM  Wt Readings from Last 3 Encounters:   11/01/22 (!) 431 lb (195.5 kg)   11/01/22 (!) 431 lb (195.5 kg)   10/18/22 (!) 430 lb 1.6 oz (195.1 kg)       Current medications:  Prior to Admission medications    Medication Sig Start Date End Date Taking? Authorizing Provider   pantoprazole (PROTONIX) 40 MG tablet Take 1 tablet by mouth every morning (before breakfast) 1/3/23   Cristal Diamond MD   magnesium oxide (MAG-OX) 400 (240 Mg) MG tablet Take 1 tablet by mouth 2 times daily Take with food 1/3/23   Cristal Diamond MD   albuterol sulfate HFA (PROVENTIL;VENTOLIN;PROAIR) 108 (90 Base) MCG/ACT inhaler Inhale 2 puffs into the lungs every 6 hours as needed for Wheezing or Shortness of Breath 1/3/23   Cristal Diamond MD   gabapentin (NEURONTIN) 300 MG capsule TAKE ONE CAPSULE BY MOUTH THREE TIMES A DAY 12/27/22 4/7/23  Bruce Brower MD   oxyCODONE HCl (OXY-IR) 10 MG immediate release tablet Take 1 tablet by mouth every 8 hours as needed for Pain for up to 30 days. 12/27/22 1/26/23  Cristal Diamond MD   ADVAIR -21 MCG/ACT inhaler INHALE TWO PUFFS BY MOUTH TWICE A DAY 12/21/22   Cristal Diamond MD   Potassium 99 MG TABS Take 4 tablets by mouth in the morning and at bedtime 12/19/22 3/19/23  Cristal Diamond MD   Insulin Pen Needle (PEN NEEDLES) 31G X 6 MM MISC Use with Humulin R U-500 Kwikpen to inject insulin 3 times a day. (Fill preferred brand/generic and amount covered by insurance.) 12/16/22   Cristal Diamond MD   HUMULIN R U-500 KWIKPEN 500 UNIT/ML SOPN concentrated injection pen Take 350 units with breakfast; 250 units with lunch and 125 units with dinner.  12/13/22 1/3/23  Paige Bryant MD   FEROSUL 325 (65 Fe) MG tablet TAKE ONE TABLET BY MOUTH DAILY 12/5/22   Cristal Diamond MD   metoprolol tartrate (LOPRESSOR) 50 MG tablet TAKE ONE TABLET BY MOUTH TWICE A DAY 11/21/22   Cristal Diamond MD   venlafaxine (EFFEXOR XR) 150 MG extended release capsule Take 1 capsule by mouth every morning Interact with amitriptyline, please monitor for serotonin syndrome symptoms 11/21/22   Giovana Aldridge MD   Insulin Syringe-Needle U-100 (BD INSULIN SYRINGE U/F) 31G X 5/16\" 1 ML MISC USE TO SUBCUTANEOUSLY INJECT INSULIN THREE TIMES A DAY 11/7/22   Giovana Aldridge MD   tiZANidine (ZANAFLEX) 4 MG tablet Take 1 tablet by mouth every 8 hours as needed (Back pain and muscle spasms) 10/31/22   Giovana Aldridge MD   vitamin B-12 (CYANOCOBALAMIN) 500 MCG tablet Take 1 tablet by mouth daily 10/24/22   Giovana Aldridge MD   ketoconazole (NIZORAL) 2 % cream Apply twice a day for yeast infection in the skin folds, for 4 weeks 10/24/22   Giovana Aldridge MD   clobetasol (TEMOVATE) 0.05 % ointment Apply topically 2 times daily for psoriasis 10/24/22   Giovana Aldridge MD   ammonium lactate (LAC-HYDRIN) 12 % lotion Apply topically daily. 10/24/22   Giovana Aldridge MD   nystatin (MYCOSTATIN) 749010 UNIT/GM powder Apply 2 times daily in the skin folds for long term.  10/24/22   Giovana Aldridge MD   docusate sodium (COLACE) 100 MG capsule Take 1 capsule by mouth 2 times daily For constipation 10/10/22   Giovana Aldridge MD   nystatin (MYCOSTATIN) 128920 UNIT/ML suspension Take 5 mLs by mouth 4 times daily Swish and swallow 10/4/22   Giovana Aldridge MD   bumetanide (BUMEX) 1 MG tablet Take 1 tablet by mouth 2 times daily Dose decreased 9/26/2022 due to acute kidney injury 9/26/22   Giovana Aldridge MD   allopurinol (ZYLOPRIM) 300 MG tablet Take 1 tablet by mouth daily For gout, stop medication if any rash develops 9/26/22   Giovana Aldridge MD   metOLazone (ZAROXOLYN) 2.5 MG tablet Take 1 tablet by mouth daily Take 30 mins before am Bumex dose 9/14/22   [facility], MD   vitamin D3 (CHOLECALCIFEROL) 25 MCG (1000 UT) TABS tablet TAKE ONE TABLET BY MOUTH DAILY 9/6/22   Giovana Alrdidge MD   varenicline (CHANTIX) 1 MG tablet Take 1 mg by mouth 2 times daily  Patient not taking: No sig reported    Historical Provider, MD   blood glucose monitor strips Test 3 times a day & as needed for symptoms of irregular blood glucose. Dispense sufficient amount for indicated testing frequency plus additional to accommodate PRN testing needs. One touch Ultra blue. To be used if Chew not working or to double check sugars.  9/6/22   Rodrigo Ch MD   amitriptyline (ELAVIL) 50 MG tablet TAKE ONE TABLET BY MOUTH EVERY NIGHT AT BEDTIME 9/1/22   Hermilo Benitez MD   Respiratory Therapy Supplies (NEBULIZER/TUBING/MOUTHPIECE) KIT Dx COPD needs nebulizer supplies 8/22/22   Rodrigo Ch MD   Continuous Blood Gluc Sensor (FREESTYLE CRISTINE 14 DAY SENSOR) [facility] USE AS DIRECTED TO MONITOR BLOOD SUGAR AND CHANGE EVERY 14 DAYS 7/28/22   Paige Bryant MD   albuterol (PROVENTIL) (2.5 MG/3ML) 0.083% nebulizer solution USE THREE MILLILITERS ( ONE VIAL) VIA NEBULIZATION BY MOUTH EVERY 6 HOURS AS NEEDED FOR WHEEZING OR FOR SHORTNESS OF BREATH 7/11/22   Rodrigo Ch MD   rosuvastatin (CRESTOR) 10 MG tablet Take 1 tablet by mouth nightly Stop pravastatin 5/9/22   Rodrigo Ch MD   chlorhexidine (HIBICLENS) 4 % external liquid Use once or twice a day to clean the left armpit, diluted in water and wash the left armpit with it 3/2/22   Rodrigo Ch MD   naloxone 4 MG/0.1ML LIQD nasal spray 1 spray by Nasal route as needed for Opioid Reversal Patient needs counseling 2/27/22   Rodrigo Ch MD   butalbital-acetaminophen-caffeine (FIORICET, ESGIC) -40 MG per tablet Take 1 tablet by mouth every 8 hours as needed for Headaches 2/11/22   Hermilo Benitez MD   SPIRIVA RESPIMAT 2.5 MCG/ACT AERS inhaler INHALE TWO PUFFS BY MOUTH DAILY 12/1/21   Rodrigo Ch MD   MUCUS RELIEF 600 MG extended release tablet  11/8/21   Historical Provider, MD Migel Pedraza 0.02-0.005 % SOLN  10/7/21   Historical Provider, MD   epoetin leslie-epbx (RETACRIT) 3000 UNIT/ML SOLN injection Inject 1 mL into the skin three times a week 10/4/21   Nubia Shore MD   latanoprost (XALATAN) 0.005 % ophthalmic solution 1 drop nightly    Historical Provider, MD   clopidogrel (PLAVIX) 75 MG tablet Take 1 tablet by mouth daily 8/11/21   Jessenia Hope MD   nitroGLYCERIN (NITROSTAT) 0.4 MG SL tablet DISSOLVE 1 TAB UNDER TONGUE FOR CHEST PAIN - IF PAIN REMAINS AFTER 5 MIN, CALL 911 AND REPEAT DOSE. MAX 3 TABS IN 15 MINUTES  Patient not taking: Reported on 11/1/2022 8/2/21   Jessenia Hope MD   Lancets MISC Use to check blood sugar three times daily along with when necessary due to symptoms. 9/30/20   Ryan Sanchez MD   Oxygen Tubing MISC by Does not apply route DX COPD. chronic respiratory failure 3/26/20   Jessenia Hope MD   ONE TOUCH ULTRASOFT LANCETS MISC Patient to test blood sugar up to 4 times daily. 11/7/19   Ryan Sanchez MD   Lancet Devices (LANCING DEVICE) MISC Provide patient with lancing device appropriate for his machine/lancing needles. 6/4/19   Jessenia Hope MD   Handicap Placard MISC by Does not apply route Can't walk greater than 200 feet. Expires in 5 years. 2/13/19   Paige Bryant MD   fluticasone (CUTIVATE) 0.05 % cream Apply topically 2 times daily  4/19/17   Historical Provider, MD   fluticasone (FLONASE) 50 MCG/ACT nasal spray 2 sprays by Nasal route daily  Patient taking differently: 2 sprays by Nasal route daily as needed (sinus symptoms) 1/16/17   Jessenia Hope MD   Melatonin 10 MG TABS Take 10 mg by mouth nightly as needed (insomnia) 12/23/16   Jessenia Hope MD   aspirin 81 MG EC tablet Take 81 mg by mouth daily.     Historical Provider, MD       Immunizations:   Most Recent Immunizations   Administered Date(s) Administered    COVID-19, MODERNA BLUE border, Primary or Immunocompromised, (age 12y+), IM, 100 mcg/0.5mL 12/15/2021    COVID-19, PFIZER GRAY top, DO NOT Dilute, (age 15 y+), IM, 30 mcg/0.3 mL 04/23/2022    DT (pediatric) 12/14/1998    Hepatitis B Adult (Engerix-B) 12/04/2019    Hepatitis B Adult (Recombivax HB) 12/04/2019    Influenza Virus Vaccine 10/12/2015    Influenza, FLUARIX, FLULAVAL, FLUZONE (age 10 mo+) AND AFLURIA, (age 1 y+), PF, 0.5mL 10/09/2020    Influenza, FLUCELVAX, (age 10 mo+), MDCK, PF, 0.5mL 10/11/2022    Pneumococcal Polysaccharide ([id number]) 05/23/2013    Pneumococcal conjugate PCV20, PF (Prevnar 20) 04/23/2022    Tdap (Boostrix, Adacel) 09/12/2018    Zoster Recombinant (Shingrix) 02/14/2022       Home Blood Glucose Results:   Per patient's Freestyle Lianne report below                   Assessment     A1c at goal:No: 8.8 (9/26/22) POC at endocrinology office. Blood Pressure at goal: Yes  Weight at goal: No:    Physical activity: No  Physical activity at goal: No    Smoking Cessation: No - currently 1ppd    Cholesterol at target: Yes  Annual eye exam completed: Yes  Comprehensive Foot Exam Completed: Yes  Annual urine albumin and serum creatinine: Yes    Statin: Yes    Appropriate?: Yes  Changes made: refill provided    ACE/ARB: Yes  Appropriate?: Yes  Changes made:     Aspirin: Yes  Appropriate?: Yes  Changes made:     Eating patterns:    [x]  My plate    []  Mediterranean diet   []  Low sodium   []  DASH diet   [x]  Portion control   [x]  Reduced calorie    []  Fast food / Restaurant  []  High glycemic index foods   []  Sugary beverages   []  Other     Comment: see above    Current Medications Affecting Diabetes:  Humulin R 500    Compliant:No  Barriers to medication therapy: anxiety / depression    Medications attempted in the past:  Metformin - cardiologist took him off but patient unsure why  Januvia - PCP took him off but patient unsure why    Recent exacerbations / new problems:       Last office dictation reviewed: yes        type 2 DM under worsening control as evidenced by blood sugars on Lianne and elevating A1c.     Plan   Counseling at today's visit:     -Continued monitoring of blood glucose with use of Freestyle Lianne. Call with any issues with sensor. Bring data cord to each visit.      -Change insulin to 0.75 at breakfast, 0.55 at lunch time, 0.2 with dinner. If blood sugar 200 or more at dinner take 0.25. If blood sugar 150 or more when eat subway sandwich at night take 0.15     -Call dermatologist office and inform them of growths on right hand knuckles. Do not cut or try to break them open on your own. -Call our office when you are ready to stop or cut down smoking.    -Remember to check your feet at least once a day. Use a mirror if someone can not help you. Physician Follow-up:   Dr Carmine Bender - 1/4/23    Medication Management Follow-up:   Diabetes Service   1 1/2 month    Electronically signed by Duncan Jimenes RPH on 1/3/2023 at [id number] [facility] in place:  Yes  Recommendation Provided To: Patient/Caregiver: 4 via In person  Intervention Detail: Dose Adjustment: 1, reason: Therapy Optimization, New Rx: 1, reason: Needs Additional Therapy, Refill(s) Provided and Scheduled Appointment  Intervention Accepted By: Patient/Caregiver: 4  Time Spent (min): 45     Yesenia Rizvi [facility],Pharm. D,, BCPS, CACP  1/3/2023  4:14 PM

## 2023-01-03 NOTE — DISCHARGE INSTRUCTIONS
Remember to check your feet at least once a day. Use a mirror if someone can not help you. Change insulin to 0.75 at breakfast, 0.55 at lunch time, 0.2 with dinner. If blood sugar 200 or more at dinner take 0.25. If blood sugar 150 or more when eat subway sandwich at night take 0.15    Call dermatologist office and inform them of growths on right hand knuckles. Do not cut or try to break them open on your own. Call our office when you are ready to stop or cut down smoking.

## 2023-01-12 LAB
AVERAGE GLUCOSE: ABNORMAL
HBA1C MFR BLD: 9.5 %

## 2023-01-16 ENCOUNTER — OFFICE VISIT (OUTPATIENT)
Dept: DERMATOLOGY | Age: 58
End: 2023-01-16
Payer: COMMERCIAL

## 2023-01-16 VITALS
OXYGEN SATURATION: 93 % | SYSTOLIC BLOOD PRESSURE: 142 MMHG | WEIGHT: 315 LBS | BODY MASS INDEX: 59.11 KG/M2 | DIASTOLIC BLOOD PRESSURE: 54 MMHG | TEMPERATURE: 97.1 F | HEART RATE: 91 BPM

## 2023-01-16 DIAGNOSIS — L85.8 RETENTION HYPERKERATOSIS: ICD-10-CM

## 2023-01-16 DIAGNOSIS — L30.8 OTHER SPECIFIED DERMATITIS: ICD-10-CM

## 2023-01-16 DIAGNOSIS — L73.2 HIDRADENITIS SUPPURATIVA: Primary | ICD-10-CM

## 2023-01-16 PROCEDURE — G8482 FLU IMMUNIZE ORDER/ADMIN: HCPCS | Performed by: DERMATOLOGY

## 2023-01-16 PROCEDURE — 4004F PT TOBACCO SCREEN RCVD TLK: CPT | Performed by: DERMATOLOGY

## 2023-01-16 PROCEDURE — 11105 PUNCH BX SKIN EA SEP/ADDL: CPT | Performed by: DERMATOLOGY

## 2023-01-16 PROCEDURE — G8417 CALC BMI ABV UP PARAM F/U: HCPCS | Performed by: DERMATOLOGY

## 2023-01-16 PROCEDURE — 3017F COLORECTAL CA SCREEN DOC REV: CPT | Performed by: DERMATOLOGY

## 2023-01-16 PROCEDURE — 99204 OFFICE O/P NEW MOD 45 MIN: CPT | Performed by: DERMATOLOGY

## 2023-01-16 PROCEDURE — G8427 DOCREV CUR MEDS BY ELIG CLIN: HCPCS | Performed by: DERMATOLOGY

## 2023-01-16 PROCEDURE — 11104 PUNCH BX SKIN SINGLE LESION: CPT | Performed by: DERMATOLOGY

## 2023-01-16 RX ORDER — POTASSIUM CHLORIDE 1500 MG/1
TABLET, EXTENDED RELEASE ORAL
COMMUNITY
Start: 2022-12-10

## 2023-01-16 RX ORDER — LIDOCAINE HYDROCHLORIDE 10 MG/ML
1 INJECTION, SOLUTION INFILTRATION; PERINEURAL ONCE
Status: SHIPPED | OUTPATIENT
Start: 2023-01-16

## 2023-01-16 RX ORDER — CLINDAMYCIN PHOSPHATE 10 UG/ML
LOTION TOPICAL
Qty: 60 ML | Refills: 3 | Status: SHIPPED | OUTPATIENT
Start: 2023-01-16

## 2023-01-16 RX ORDER — POTASSIUM CHLORIDE 750 MG/1
TABLET, FILM COATED, EXTENDED RELEASE ORAL
COMMUNITY
Start: 2022-12-19

## 2023-01-16 RX ORDER — CLOBETASOL PROPIONATE 0.5 MG/G
OINTMENT TOPICAL
Qty: 60 G | Refills: 2 | Status: SHIPPED | OUTPATIENT
Start: 2023-01-16

## 2023-01-16 NOTE — PROGRESS NOTES
Dermatology Patient Note  [address]  [address] [place] [id number]  Dept: [phone]  Dept Fax: [phone]      VISITDATE: 1/16/2023   REFERRING PROVIDER: Uriel Raphael MD      Sonja Hyde. is a 61 y.o. male  who presents today in the office for:    Establish Care (HS, pt has concerns with hands and BL behind ears)      HISTORY OF PRESENT ILLNESS:  New patient, initially referred for HS. Patient has thickened skin on his hands and bilateral ears progressively worsening for approximately four years. He states that this is the most bothersome to him and he would like to focus on this today. Patient reports the symptoms on his ears began after a surgery on the left TM. He currently wears a CPAP at night which irritates his ears. He reports the areas are very itchy and admits to scraping at his hands. Patient adds that he is very itchy all over and has chronic dry skin.      MEDICAL PROBLEMS:  Patient Active Problem List    Diagnosis Date Noted    Chronic pancreatitis [facility])      Priority: Low     replace inactive diagnosis      Diabetic ulcer of toe of right foot associated with type 2 diabetes mellitus, with muscle involvement without evidence of necrosis ([facility] 04/19/2022    MDD (major depressive disorder), recurrent severe, without psychosis ([facility] 03/07/2022    Hidradenitis suppurativa of left axilla 03/07/2022    Hypokalemia 02/10/2022    Benign hypertensive heart and CKD, stage 3 (GFR 30-59), w CHF ([address] 10/14/2021    Normocytic anemia 09/12/2021    LBBB (left bundle branch block)     Chronic gout due to renal impairment without tophus 09/03/2021 8/30/21 Uric acid 11.5      PVD (peripheral vascular disease) ([facility] 05/07/2021    Lymphedema of both lower extremities 04/27/2021    Venous insufficiency of both lower extremities 04/27/2021    Psoriasis 01/27/2021    Acute malignant otitis externa of right ear 07/24/2020    Hypomagnesemia 07/13/2020    Chronic respiratory failure with hypoxia ([address] 03/26/2020    Mobility impaired 12/05/2019    Vitamin B 12 deficiency 11/27/2019    Recurrent infection of skin 08/11/2019    Bilateral hearing loss 06/03/2019    Venous insufficiency of left lower extremity 02/22/2019    Postlaminectomy syndrome of lumbar region 11/15/2018    Celiac artery stenosis ([address] 05/01/2018    Dry skin dermatitis 09/08/2017    BARBY on CPAP 07/20/2017    Hydrocele, bilateral 07/10/2017    Tinea pedis of both feet 07/05/2017    Onychomycosis 07/05/2017    Moderate episode of recurrent major depressive disorder ([address] 07/05/2017    Chronic infection of both external ears 04/28/2017    Slow transit constipation 03/25/2017    Nuclear nonsenile cataract 03/08/2017    Mixed conductive and sensorineural hearing loss of both ears 01/10/2017     Per ENT      Tinnitus of both ears 01/10/2017     Per ENT      History of recurrent ear infection 11/25/2016    Anxiety 10/02/2016    Intertrigo axillary b/l 10/02/2016    Chronic midline low back pain with left-sided sciatica 08/21/2016    Stasis dermatitis of both legs 08/21/2016    Vitamin D deficiency 04/21/2016    Iron deficiency anemia due to chronic blood loss 04/21/2016    Lower urinary tract symptoms (LUTS) 02/25/2016    Gastroesophageal reflux disease without esophagitis 02/15/2016    Hyperlipidemia with target LDL less than 70 02/15/2016    Spinal stenosis of lumbar region without neurogenic claudication 01/06/2016     MRI lumbar 12/30/15  L3-L4: There is broad-based bulging disc which appears protruding left laterally causing flattening of the ventral thecal sac. In addition, there is facet arthropathy with mild hypertrophic changes. There is borderline central canal stenosis with    evidence of moderate left neural foraminal narrowing and mild right neural foramina narrowing.        L4-L5: There is broad-based protruding disc which appears borderline extruding centrally causing significant mass effect on the thecal sac. In addition, facet arthrosis is noted with evidence of moderate central canal stenosis and bilateral moderate to    severe neural foraminal stenosis.                Chronic back pain greater than 3 months duration 01/06/2016    Hepatic steatosis 12/03/2015    History of rib fracture 12/03/2015     Chronic      Umbilical hernia 06/77/4404    Adrenal gland anomaly 12/03/2015     Thickening of left adrenal.      Morbid obesity with BMI of 50.0-59.9, adult ([facility] 06/16/2015    BPH (benign prostatic hyperplasia) 01/14/2015    Insomnia 02/06/2014    Diabetes mellitus type 2 in obese ([facility] 02/06/2014    Chronic diastolic congestive heart failure ([facility] 08/14/2013    Displacement of lumbar intervertebral disc without myelopathy 06/13/2013    Anemia of chronic renal failure, stage 3b ([facility] 05/08/2013    CAD (coronary artery disease)     Chronic obstructive pulmonary disease ([facility]     Type 2 diabetes mellitus with diabetic polyneuropathy, with long-term current use of insulin (HCC)     Hypertriglyceridemia 04/10/2013    Myopia 02/21/2013    Presbyopia 02/21/2013    Cellulitis of external cheek, right 08/29/2012    DDD (degenerative disc disease), lumbar 08/20/2012       CURRENT MEDICATIONS:   Current Outpatient Medications   Medication Sig Dispense Refill    mupirocin (BACTROBAN) 2 % ointment       pantoprazole (PROTONIX) 40 MG tablet Take 1 tablet by mouth every morning (before breakfast) 90 tablet 1    magnesium oxide (MAG-OX) 400 (240 Mg) MG tablet Take 1 tablet by mouth 2 times daily Take with food 180 tablet 3    albuterol sulfate HFA (PROVENTIL;VENTOLIN;PROAIR) 108 (90 Base) MCG/ACT inhaler Inhale 2 puffs into the lungs every 6 hours as needed for Wheezing or Shortness of Breath 8.5 g 3    insulin regular human (HUMULIN R) 500 UNIT/ML concentrated injection vial Inject into the skin See Admin Instructions 0.75 ml with breakfast, 0.55ml with lunch, 0.2ml with dinner. gabapentin (NEURONTIN) 300 MG capsule TAKE ONE CAPSULE BY MOUTH THREE TIMES A DAY 90 capsule 1    oxyCODONE HCl (OXY-IR) 10 MG immediate release tablet Take 1 tablet by mouth every 8 hours as needed for Pain for up to 30 days. 90 tablet 0    Potassium 99 MG TABS Take 4 tablets by mouth in the morning and at bedtime 720 tablet 0    Insulin Pen Needle (PEN NEEDLES) 31G X 6 MM MISC Use with Humulin R U-500 Kwikpen to inject insulin 3 times a day. (Fill preferred brand/generic and amount covered by insurance.) 100 each 3    FEROSUL 325 (65 Fe) MG tablet TAKE ONE TABLET BY MOUTH DAILY 90 tablet 3    metoprolol tartrate (LOPRESSOR) 50 MG tablet TAKE ONE TABLET BY MOUTH TWICE A  tablet 3    venlafaxine (EFFEXOR XR) 150 MG extended release capsule Take 1 capsule by mouth every morning Interact with amitriptyline, please monitor for serotonin syndrome symptoms 90 capsule 2    Insulin Syringe-Needle U-100 (BD INSULIN SYRINGE U/F) 31G X 5/16\" 1 ML MISC USE TO SUBCUTANEOUSLY INJECT INSULIN THREE TIMES A  each 4    tiZANidine (ZANAFLEX) 4 MG tablet Take 1 tablet by mouth every 8 hours as needed (Back pain and muscle spasms) 90 tablet 3    vitamin B-12 (CYANOCOBALAMIN) 500 MCG tablet Take 1 tablet by mouth daily 90 tablet 3    ketoconazole (NIZORAL) 2 % cream Apply twice a day for yeast infection in the skin folds, for 4 weeks 1 each 3    clobetasol (TEMOVATE) 0.05 % ointment Apply topically 2 times daily for psoriasis 1 each 3    ammonium lactate (LAC-HYDRIN) 12 % lotion Apply topically daily. 222 mL 0    nystatin (MYCOSTATIN) 926379 UNIT/GM powder Apply 2 times daily in the skin folds for long term.  60 g 3    docusate sodium (COLACE) 100 MG capsule Take 1 capsule by mouth 2 times daily For constipation 180 capsule 3    bumetanide (BUMEX) 1 MG tablet Take 1 tablet by mouth 2 times daily Dose decreased 9/26/2022 due to acute kidney injury 540 tablet 0    allopurinol (ZYLOPRIM) 300 MG tablet Take 1 tablet by mouth daily For gout, stop medication if any rash develops 90 tablet 3    metOLazone (ZAROXOLYN) 2.5 MG tablet Take 1 tablet by mouth daily Take 30 mins before am Bumex dose 30 tablet 6    vitamin D3 (CHOLECALCIFEROL) 25 MCG (1000 UT) TABS tablet TAKE ONE TABLET BY MOUTH DAILY 90 tablet 1    blood glucose monitor strips Test 3 times a day & as needed for symptoms of irregular blood glucose. Dispense sufficient amount for indicated testing frequency plus additional to accommodate PRN testing needs. One touch Ultra blue. To be used if Chew not working or to double check sugars. 300 strip 3    amitriptyline (ELAVIL) 50 MG tablet TAKE ONE TABLET BY MOUTH EVERY NIGHT AT BEDTIME 90 tablet 0    Respiratory Therapy Supplies (NEBULIZER/TUBING/MOUTHPIECE) KIT Dx COPD needs nebulizer supplies 1 kit 11    Continuous Blood Gluc Sensor (FREESTYLE CRISTINE 14 DAY SENSOR) [facility] USE AS DIRECTED TO MONITOR BLOOD SUGAR AND CHANGE EVERY 14 DAYS 6 each 3    albuterol (PROVENTIL) (2.5 MG/3ML) 0.083% nebulizer solution USE THREE MILLILITERS ( ONE VIAL) VIA NEBULIZATION BY MOUTH EVERY 6 HOURS AS NEEDED FOR WHEEZING OR FOR SHORTNESS OF BREATH 360 mL 3    rosuvastatin (CRESTOR) 10 MG tablet Take 1 tablet by mouth nightly Stop pravastatin 90 tablet 3    chlorhexidine (HIBICLENS) 4 % external liquid Use once or twice a day to clean the left armpit, diluted in water and wash the left armpit with it 946 mL 3    naloxone 4 MG/0.1ML LIQD nasal spray 1 spray by Nasal route as needed for Opioid Reversal Patient needs counseling 1 each 3    SPIRIVA RESPIMAT 2.5 MCG/ACT AERS inhaler INHALE TWO PUFFS BY MOUTH DAILY 4 g 5    ROCKLATAN 0.02-0.005 % SOLN       clopidogrel (PLAVIX) 75 MG tablet Take 1 tablet by mouth daily 90 tablet 3    nitroGLYCERIN (NITROSTAT) 0.4 MG SL tablet DISSOLVE 1 TAB UNDER TONGUE FOR CHEST PAIN - IF PAIN REMAINS AFTER 5 MIN, CALL 911 AND REPEAT DOSE.  MAX 3 TABS IN 15 MINUTES 25 tablet 2    Lancets MISC Use to check blood sugar three times daily along with when necessary due to symptoms. 300 each 2    Oxygen Tubing MISC by Does not apply route DX COPD. chronic respiratory failure 1 each 0    ONE TOUCH ULTRASOFT LANCETS MISC Patient to test blood sugar up to 4 times daily. 300 each 3    Lancet Devices (LANCING DEVICE) MISC Provide patient with lancing device appropriate for his machine/lancing needles. 1 each 1    Handicap Placard MISC by Does not apply route Can't walk greater than 200 feet. Expires in 5 years. 1 each 0    fluticasone (CUTIVATE) 0.05 % cream Apply topically 2 times daily       fluticasone (FLONASE) 50 MCG/ACT nasal spray 2 sprays by Nasal route daily (Patient taking differently: 2 sprays by Nasal route daily as needed (sinus symptoms)) 1 Bottle 3    Melatonin 10 MG TABS Take 10 mg by mouth nightly as needed (insomnia) 90 tablet 1    aspirin 81 MG EC tablet Take 81 mg by mouth daily. potassium chloride (KLOR-CON) 10 MEQ extended release tablet  (Patient not taking: Reported on 1/16/2023)      KLOR-CON M20 20 MEQ extended release tablet  (Patient not taking: Reported on 1/16/2023)      ADVAIR -21 MCG/ACT inhaler INHALE TWO PUFFS BY MOUTH TWICE A DAY 36 g 5    nystatin (MYCOSTATIN) 979371 UNIT/ML suspension Take 5 mLs by mouth 4 times daily Swish and swallow 240 mL 0    varenicline (CHANTIX) 1 MG tablet Take 1 mg by mouth 2 times daily (Patient not taking: No sig reported)      butalbital-acetaminophen-caffeine (FIORICET, ESGIC) -40 MG per tablet Take 1 tablet by mouth every 8 hours as needed for Headaches 60 tablet 1    MUCUS RELIEF 600 MG extended release tablet  (Patient not taking: Reported on 1/16/2023)      epoetin leslie-epbx (RETACRIT) 3000 UNIT/ML SOLN injection Inject 1 mL into the skin three times a week 21.9 mL 0    latanoprost (XALATAN) 0.005 % ophthalmic solution 1 drop nightly (Patient not taking: Reported on 1/16/2023)       No current facility-administered medications for this visit. ALLERGIES:   Allergies   Allergen Reactions    Levofloxacin Anaphylaxis     Patient reports needing epinephrine \"about 5 or 6 months ago\" for anaphylaxis (itching, hives, SOB/swelling) after receiving Levofloxacin. Previous report from 2012: Nausea/Vomiting    Lorazepam      Falls      Nsaids      CHF&CKD    Prozac [Fluoxetine Hcl] Other (See Comments)     Pt started with seizures after started taking. Vancomycin Other (See Comments)     Itching, SOB, emesis upon infusion in ED 2019. Patient states he has had vancomycin \"a number of times\" before without issue. couldn't breath and talk, throat closed       SOCIAL HISTORY:  Social History     Tobacco Use    Smoking status: Every Day     Packs/day: 1.00     Years: 33.00     Pack years: 33.00     Types: Cigarettes     Start date: 1985     Last attempt to quit: 2020     Years since quittin.2    Smokeless tobacco: Former     Types: Snuff     Quit date: 1995    Tobacco comments:     Quit smoking again 8/15/2022   Substance Use Topics    Alcohol use: No     Alcohol/week: 0.0 standard drinks       Pertinent ROS:  Review of Systems  Skin: Denies any new changing, growing or bleeding lesions or rashes except as described in the HPI   Constitutional: Denies fevers, chills, and malaise. PHYSICAL EXAM:   BP (!) 142/54   Pulse 91   Temp 97.1 °F (36.2 °C) (Temporal)   Wt (!) 448 lb (203.2 kg)   SpO2 93%   BMI 59.11 kg/m²     The patient is generally well appearing, well nourished, alert and conversational. Affect is normal.    Cutaneous Exam:  Physical Exam  Waist-up skin: the head/face,neck, both arms, chest, back, abdomen, digits and/or nails, was examined. Limited exam due to patient being in wheelchair. Facial covering was removed during examination. Diagnoses/exam findings/medical history pertinent to this visit are listed below:    Assessment:   Diagnosis Orders   1.  Hidradenitis suppurativa Plan:  Hidradenitis suppurativa, Wheatley Stage I-II, involving axillae, groin, inframammary folds, and mons pubis  - chronic illness with progression and/or exacerbation   - discussed diagnosis, etiology, natural course, and treatment options  - benzoyl peroxide 5% wash to affected areas daily (OTC examples in AVS)  - clindamycin 1% lotion to affected area daily     Other specified dermatitis  Ddx includes atypical presentation of eczema vs psoriasis with secondary secondary lichenification, less likely lichen planus or scabies  Punch Biopsy x 2: The procedure and its risks were explained including but not limited to pain, bleeding, infection, permanent scar, permanent pigment alteration and need for an additional procedure. Consent to proceed with the procedure was obtained from the patient or the parent. After cleaning with alcohol the rash was anesthetized with 1% lidocaine with epinephrine and two 4 mm punches were performed on the right wrist and left ear. Hemostasis was achieved with suture closure of the defects with 4-0 Ethilon and Vaseline and a bandage were applied. Retention hyperkeratosis of trunk  - counseled on hygiene practices, should come off with good scrube    RTC 2 months    Future Appointments   Date Time Provider William Hernandez   2/6/2023  2:45 PM Lora Singh MD [facility]   2/15/2023  8:30 AM [facility] MEDICATION MGMT ST[facility] MED MGMT [facility]   3/7/2023  9:00 AM Michael Root MD  sc Cristal Rudolph   4/7/2023  9:40 AM Jasmina Sheikh MD [address] Neurology -   6/1/2023  8:00 AM Miriam Saul MD  Cancer [facility]         There are no Patient Instructions on file for this visit. Gerard Jenkins, personally scribed the services dictated to me by Dr. Ryan Cordero in this documentation.      I, Dr. Ryan Cordero, personally performed the services described in this documentation, as scribed by Ivory Kamara in my presence, and it is both accurate and complete.     Electronically signed by Asad Finney MD on 1/16/2023 at 1:07 PM

## 2023-01-17 ENCOUNTER — NURSE ONLY (OUTPATIENT)
Dept: LAB | Age: 58
End: 2023-01-17

## 2023-01-18 DIAGNOSIS — E11.42 TYPE 2 DIABETES MELLITUS WITH DIABETIC POLYNEUROPATHY, WITH LONG-TERM CURRENT USE OF INSULIN (HCC): ICD-10-CM

## 2023-01-18 DIAGNOSIS — Z79.4 TYPE 2 DIABETES MELLITUS WITH DIABETIC POLYNEUROPATHY, WITH LONG-TERM CURRENT USE OF INSULIN (HCC): ICD-10-CM

## 2023-01-18 NOTE — RESULT ENCOUNTER NOTE
Management per endocrinologist, worsening diabetes    Lab Results       Component                Value               Date                       LABA1C                   9.5                 01/12/2023                 LABA1C                   8.8                 09/26/2022                 LABA1C                   7.8 (H)             06/06/2022                  Future Appointments  1/24/2023  10:40 AM   SCHEDULE, MHPX MH DERMATO* [facility] derm             [facility]  1/25/2023  9:30 AM    STV CT ROOM 1              STVZ CT SCAN        STV Radiolog  2/6/2023   2:45 PM    Amanda Winter MD   [facility] Renal[facility]rv        [facility] Renal Se  2/15/2023  8:30 AM    [facility] MEDICATION MGMT       [facility] MED MGMT        [facility]  3/7/2023   9:00 AM    Sherry Kelley MD     fp sc               [facility]  4/7/2023   9:40 AM    Lakshmi Mitchell MD     [address]          Neurology -  4/27/2023  11:30 AM   Reymundo Faulkner MD          derm             [facility]  6/1/2023   8:00 AM    Bree Burotn MD     [facility]        [address] [facility]

## 2023-01-19 VITALS — HEIGHT: 72 IN | BODY MASS INDEX: 42.66 KG/M2 | WEIGHT: 315 LBS

## 2023-01-19 NOTE — LETTER
1/19/2023        [address] [facility] [address]    Dear Elizabeth Jones.:    Your healthcare provider has ordered a low dose CT scan of the chest for lung cancer screening. Enclosed you will find information about CT lung screening and smoking cessation resources. If you are unable to keep you appointment for you CT lung screening, please call our scheduling department at [phone]. Keep in mind that CT lung screening does not take the place of smoking cessation. Please do not hesitate to contact me if you have any questions or concerns.     [address],      [id number] [place] [address] Lung Screening Program  [phone]

## 2023-01-23 ENCOUNTER — TELEPHONE (OUTPATIENT)
Dept: DERMATOLOGY | Age: 58
End: 2023-01-23

## 2023-01-24 ENCOUNTER — NURSE ONLY (OUTPATIENT)
Dept: DERMATOLOGY | Age: 58
End: 2023-01-24

## 2023-01-24 VITALS — TEMPERATURE: 98.2 F | HEIGHT: 72 IN | HEART RATE: 85 BPM | OXYGEN SATURATION: 94 % | BODY MASS INDEX: 59 KG/M2

## 2023-01-24 DIAGNOSIS — Z48.02 VISIT FOR SUTURE REMOVAL: Primary | ICD-10-CM

## 2023-01-24 PROCEDURE — 99024 POSTOP FOLLOW-UP VISIT: CPT | Performed by: DERMATOLOGY

## 2023-01-25 ENCOUNTER — PATIENT MESSAGE (OUTPATIENT)
Dept: FAMILY MEDICINE CLINIC | Age: 58
End: 2023-01-25

## 2023-01-25 DIAGNOSIS — M96.1 POSTLAMINECTOMY SYNDROME OF LUMBAR REGION: ICD-10-CM

## 2023-01-25 DIAGNOSIS — M51.36 DDD (DEGENERATIVE DISC DISEASE), LUMBAR: ICD-10-CM

## 2023-01-25 DIAGNOSIS — L73.2 HIDRADENITIS SUPPURATIVA: Primary | ICD-10-CM

## 2023-01-25 DIAGNOSIS — M54.42 CHRONIC MIDLINE LOW BACK PAIN WITH LEFT-SIDED SCIATICA: ICD-10-CM

## 2023-01-25 DIAGNOSIS — M48.061 SPINAL STENOSIS OF LUMBAR REGION WITHOUT NEUROGENIC CLAUDICATION: ICD-10-CM

## 2023-01-25 DIAGNOSIS — G89.29 CHRONIC MIDLINE LOW BACK PAIN WITH LEFT-SIDED SCIATICA: ICD-10-CM

## 2023-01-25 RX ORDER — DOXYCYCLINE HYCLATE 100 MG
100 TABLET ORAL 2 TIMES DAILY
Qty: 20 TABLET | Refills: 0 | Status: SHIPPED | OUTPATIENT
Start: 2023-01-25 | End: 2023-02-04

## 2023-01-25 RX ORDER — OXYCODONE HYDROCHLORIDE 10 MG/1
10 TABLET ORAL EVERY 8 HOURS PRN
Qty: 90 TABLET | Refills: 0 | Status: SHIPPED | OUTPATIENT
Start: 2023-01-25 | End: 2023-02-24

## 2023-01-25 RX ORDER — CHLORHEXIDINE GLUCONATE 213 G/1000ML
SOLUTION TOPICAL
Qty: 946 ML | Refills: 2 | Status: SHIPPED | OUTPATIENT
Start: 2023-01-25

## 2023-01-25 NOTE — TELEPHONE ENCOUNTER
Please Approve or Refuse.   Send to Pharmacy per Pt's Request:      Next Visit Date:  3/7/2023   Last Visit Date: 10/11/2022    Hemoglobin A1C (%)   Date Value   01/12/2023 9.5   09/26/2022 8.8   06/06/2022 7.8 (H)             ( goal A1C is < 7)   BP Readings from Last 3 Encounters:   01/16/23 (!) 142/54   01/03/23 (!) 109/56   11/01/22 122/80          (goal 120/80)  BUN   Date Value Ref Range Status   11/15/2022 38 (H) 6 - 20 mg/dL Final     Creatinine   Date Value Ref Range Status   11/15/2022 1.58 (H) 0.70 - 1.20 mg/dL Final     Potassium   Date Value Ref Range Status   11/15/2022 3.4 (L) 3.7 - 5.3 mmol/L Final

## 2023-01-25 NOTE — TELEPHONE ENCOUNTER
From: Harvey Fernandez.   To: Dr. Margaret Singh: 1/25/2023 11:18 AM EST  Subject: Infection     Can you please give me something for my Really Bad Infection I have In my Groin Area I cant even walk

## 2023-01-30 NOTE — TELEPHONE ENCOUNTER
Pharmacy requesting refill of amitriptyline. Dr. Valentin Brennan is out of the office.     Medication active on med list yes      Date of last fill: 9/1/22 90days with 0 refills verified on 1/30/23  verified by GOSIA RN      Date of last appointment 2/11/22    Next Visit Date:  4/7/2023

## 2023-01-31 RX ORDER — AMITRIPTYLINE HYDROCHLORIDE 50 MG/1
TABLET, FILM COATED ORAL
Qty: 90 TABLET | Refills: 0 | Status: SHIPPED | OUTPATIENT
Start: 2023-01-31

## 2023-02-01 ENCOUNTER — HOSPITAL ENCOUNTER (OUTPATIENT)
Age: 58
Discharge: HOME | End: 2023-02-01
Payer: COMMERCIAL

## 2023-02-01 DIAGNOSIS — E79.0 ELEVATED URIC ACID IN BLOOD: ICD-10-CM

## 2023-02-01 DIAGNOSIS — E87.6 HYPOKALEMIA: ICD-10-CM

## 2023-02-01 DIAGNOSIS — N18.30 SECONDARY DIABETES MELLITUS WITH STAGE 3 CHRONIC KIDNEY DISEASE (HCC): ICD-10-CM

## 2023-02-01 DIAGNOSIS — I50.32 CHRONIC DIASTOLIC CONGESTIVE HEART FAILURE (HCC): ICD-10-CM

## 2023-02-01 DIAGNOSIS — I12.9 BENIGN HYPERTENSION WITH CKD (CHRONIC KIDNEY DISEASE) STAGE III (HCC): ICD-10-CM

## 2023-02-01 DIAGNOSIS — N18.31 STAGE 3A CHRONIC KIDNEY DISEASE (HCC): ICD-10-CM

## 2023-02-01 DIAGNOSIS — N18.30 BENIGN HYPERTENSION WITH CKD (CHRONIC KIDNEY DISEASE) STAGE III (HCC): ICD-10-CM

## 2023-02-01 DIAGNOSIS — N18.31 ANEMIA IN STAGE 3A CHRONIC KIDNEY DISEASE (HCC): ICD-10-CM

## 2023-02-01 DIAGNOSIS — Z79.4 TYPE 2 DIABETES MELLITUS WITH DIABETIC POLYNEUROPATHY, WITH LONG-TERM CURRENT USE OF INSULIN (HCC): ICD-10-CM

## 2023-02-01 DIAGNOSIS — D63.1 ANEMIA IN STAGE 3A CHRONIC KIDNEY DISEASE (HCC): ICD-10-CM

## 2023-02-01 DIAGNOSIS — E11.42 TYPE 2 DIABETES MELLITUS WITH DIABETIC POLYNEUROPATHY, WITH LONG-TERM CURRENT USE OF INSULIN (HCC): ICD-10-CM

## 2023-02-01 DIAGNOSIS — E13.22 SECONDARY DIABETES MELLITUS WITH STAGE 3 CHRONIC KIDNEY DISEASE (HCC): ICD-10-CM

## 2023-02-01 LAB
ABSOLUTE EOS #: 0.4 K/UL (ref 0–0.4)
ABSOLUTE LYMPH #: 1.5 K/UL (ref 1–4.8)
ABSOLUTE MONO #: 1 K/UL (ref 0.1–1.3)
ALBUMIN SERPL-MCNC: 3.9 G/DL (ref 3.5–5.2)
ALP SERPL-CCNC: 107 U/L (ref 40–129)
ALT SERPL-CCNC: 21 U/L (ref 5–41)
ANION GAP SERPL CALCULATED.3IONS-SCNC: 13 MMOL/L (ref 9–17)
ANION GAP SERPL CALCULATED.3IONS-SCNC: 13 MMOL/L (ref 9–17)
AST SERPL-CCNC: 29 U/L
BACTERIA: NORMAL
BASOPHILS # BLD: 1 % (ref 0–2)
BASOPHILS ABSOLUTE: 0.1 K/UL (ref 0–0.2)
BILIRUB SERPL-MCNC: 0.4 MG/DL (ref 0.3–1.2)
BILIRUBIN URINE: NEGATIVE
BNP SERPL-MCNC: 140 PG/ML
BUN SERPL-MCNC: 44 MG/DL (ref 6–20)
BUN SERPL-MCNC: 44 MG/DL (ref 6–20)
CALCIUM SERPL-MCNC: 10 MG/DL (ref 8.6–10.4)
CALCIUM SERPL-MCNC: 10 MG/DL (ref 8.6–10.4)
CASTS UA: NORMAL /LPF
CHLORIDE SERPL-SCNC: 93 MMOL/L (ref 98–107)
CHLORIDE SERPL-SCNC: 93 MMOL/L (ref 98–107)
CO2 SERPL-SCNC: 35 MMOL/L (ref 20–31)
CO2 SERPL-SCNC: 35 MMOL/L (ref 20–31)
COLOR: YELLOW
CREAT SERPL-MCNC: 1.59 MG/DL (ref 0.7–1.2)
CREAT SERPL-MCNC: 1.59 MG/DL (ref 0.7–1.2)
CREATININE URINE: 100.8 MG/DL (ref 39–259)
EOSINOPHILS RELATIVE PERCENT: 3 % (ref 0–4)
EPITHELIAL CELLS UA: NORMAL /HPF
GFR SERPL CREATININE-BSD FRML MDRD: 50 ML/MIN/1.73M2
GFR SERPL CREATININE-BSD FRML MDRD: 50 ML/MIN/1.73M2
GLUCOSE SERPL-MCNC: 154 MG/DL (ref 70–99)
GLUCOSE SERPL-MCNC: 154 MG/DL (ref 70–99)
GLUCOSE UR STRIP.AUTO-MCNC: NEGATIVE MG/DL
HCT VFR BLD AUTO: 39.6 % (ref 41–53)
HCT VFR BLD AUTO: 39.6 % (ref 41–53)
HGB BLD-MCNC: 12.3 G/DL (ref 13.5–17.5)
HGB BLD-MCNC: 12.3 G/DL (ref 13.5–17.5)
KETONES UR STRIP.AUTO-MCNC: NEGATIVE MG/DL
LEUKOCYTE ESTERASE UR QL STRIP.AUTO: NEGATIVE
LYMPHOCYTES # BLD: 11 % (ref 24–44)
MAGNESIUM SERPL-MCNC: 2.1 MG/DL (ref 1.6–2.6)
MAGNESIUM SERPL-MCNC: 2.1 MG/DL (ref 1.6–2.6)
MCH RBC QN AUTO: 26.6 PG (ref 26–34)
MCH RBC QN AUTO: 26.6 PG (ref 26–34)
MCHC RBC AUTO-ENTMCNC: 31.2 G/DL (ref 31–37)
MCHC RBC AUTO-ENTMCNC: 31.2 G/DL (ref 31–37)
MCV RBC AUTO: 85.2 FL (ref 80–100)
MCV RBC AUTO: 85.2 FL (ref 80–100)
MICROALBUMIN/CREAT 24H UR: 15 MG/L
MICROALBUMIN/CREAT UR-RTO: 15 MCG/MG CREAT
MONOCYTES # BLD: 8 % (ref 1–7)
NITRITE UR QL STRIP.AUTO: NEGATIVE
PDW BLD-RTO: 16.8 % (ref 11.5–14.9)
PDW BLD-RTO: 16.8 % (ref 11.5–14.9)
PHOSPHATE SERPL-MCNC: 3.4 MG/DL (ref 2.5–4.5)
PLATELET # BLD AUTO: 291 K/UL (ref 150–450)
PLATELET # BLD AUTO: 291 K/UL (ref 150–450)
PMV BLD AUTO: 8.7 FL (ref 6–12)
PMV BLD AUTO: 8.7 FL (ref 6–12)
POTASSIUM SERPL-SCNC: 3.6 MMOL/L (ref 3.7–5.3)
POTASSIUM SERPL-SCNC: 3.6 MMOL/L (ref 3.7–5.3)
PROT SERPL-MCNC: 8 G/DL (ref 6.4–8.3)
PROT UR STRIP.AUTO-MCNC: 7.5 MG/DL (ref 5–8)
PROT UR STRIP.AUTO-MCNC: NEGATIVE MG/DL
RBC # BLD: 4.65 M/UL (ref 4.5–5.9)
RBC # BLD: 4.65 M/UL (ref 4.5–5.9)
RBC CLUMPS #/AREA URNS AUTO: NORMAL /HPF
SEG NEUTROPHILS: 77 % (ref 36–66)
SEGMENTED NEUTROPHILS ABSOLUTE COUNT: 10.1 K/UL (ref 1.3–9.1)
SODIUM SERPL-SCNC: 141 MMOL/L (ref 135–144)
SODIUM SERPL-SCNC: 141 MMOL/L (ref 135–144)
SPECIFIC GRAVITY UA: 1.02 (ref 1–1.03)
TURBIDITY: CLEAR
URINE HGB: NEGATIVE
UROBILINOGEN, URINE: NORMAL
WBC # BLD AUTO: 13 K/UL (ref 3.5–11)
WBC # BLD AUTO: 13 K/UL (ref 3.5–11)
WBC UA: NORMAL /HPF

## 2023-02-01 PROCEDURE — 83735 ASSAY OF MAGNESIUM: CPT

## 2023-02-01 PROCEDURE — 85027 COMPLETE CBC AUTOMATED: CPT

## 2023-02-01 PROCEDURE — 82043 UR ALBUMIN QUANTITATIVE: CPT

## 2023-02-01 PROCEDURE — 83880 ASSAY OF NATRIURETIC PEPTIDE: CPT

## 2023-02-01 PROCEDURE — 85025 COMPLETE CBC W/AUTO DIFF WBC: CPT

## 2023-02-01 PROCEDURE — 84100 ASSAY OF PHOSPHORUS: CPT

## 2023-02-01 PROCEDURE — 81001 URINALYSIS AUTO W/SCOPE: CPT

## 2023-02-01 PROCEDURE — 80053 COMPREHEN METABOLIC PANEL: CPT

## 2023-02-01 PROCEDURE — 36415 COLL VENOUS BLD VENIPUNCTURE: CPT

## 2023-02-01 PROCEDURE — 82570 ASSAY OF URINE CREATININE: CPT

## 2023-02-01 PROCEDURE — 80048 BASIC METABOLIC PNL TOTAL CA: CPT

## 2023-02-02 NOTE — RESULT ENCOUNTER NOTE
Please notify patient: Blood glucose improved 154  Chronic kidney disease stage III stable  Mildly low potassium 3.6 but improved from prior--is he taking the potassium supplement--I understand that he takes 99 mg 4 tablets twice a day from over-the-counter? Is he taking it every day like that? Carbon dioxide increased, he needs to use his oxygen and CPAP, and absolutely does not smoke  Mild increased white blood cells, if having an infection to let us know, I did send doxycycline recently for him if the infection in the armpit is worse, to go to ER.   Anemia improved  Otherwise labs within normal limits  continue current treatment    Future Appointments  2/6/2023   2:45 PM    Nalini Bryant MD   [facility]        [facility] Renal Se  2/15/2023  8:30 AM    [facility] MEDICATION MGMT       [facility] MED MGMT        [facility]  3/7/2023   9:00 AM    Negro Patel MD      sc               [facility]  4/7/2023   9:40 AM    Forest Hu MD     [address]          Neurology -  4/27/2023  11:30 AM   Mario Montero MD          derm             [facility]  6/1/2023   8:00 AM    Mary Wells MD     [facility]        [place]ai [place]

## 2023-02-08 VITALS — BODY MASS INDEX: 42.66 KG/M2 | WEIGHT: 315 LBS | HEIGHT: 72 IN

## 2023-02-08 NOTE — LETTER
2/8/2023        [address] [facility] [address]    Dear Angela Martin.:    Your healthcare provider has ordered a low dose CT scan of the chest for lung cancer screening. Enclosed you will find information about CT lung screening and smoking cessation resources. If you are unable to keep you appointment for you CT lung screening, please call our scheduling department at [phone]. Keep in mind that CT lung screening does not take the place of smoking cessation. Please do not hesitate to contact me if you have any questions or concerns.     [address],      [address] [facility] Lung Screening Program  [phone]

## 2023-02-15 ENCOUNTER — HOSPITAL ENCOUNTER (OUTPATIENT)
Dept: PHARMACY | Age: 58
Setting detail: THERAPIES SERIES
Discharge: HOME | End: 2023-02-15
Payer: COMMERCIAL

## 2023-02-15 VITALS — DIASTOLIC BLOOD PRESSURE: 51 MMHG | HEART RATE: 78 BPM | SYSTOLIC BLOOD PRESSURE: 122 MMHG | OXYGEN SATURATION: 100 %

## 2023-02-15 PROCEDURE — 99213 OFFICE O/P EST LOW 20 MIN: CPT

## 2023-02-15 NOTE — DISCHARGE INSTRUCTIONS
Do not skip your 9pm snack and try to only have one sandwich instead of two in middle of the night. Please jasmyn down:  When you take a lower dose of insulin at breakfast  When you skip lunch / lunch insulin  If you skip evening (9pm) snack      Continue insulin at 0.75 at breakfast, 0.55 at lunch time, 0.1 with dinner.

## 2023-02-15 NOTE — PROGRESS NOTES
Diabetic Medication Management   [address] [place] [address] Dr Edith Hopkins. [place], [address] [facility]  Phone: [phone]  Fax: [phone]    NAME: Tika Brothers MEDICAL RECORD NUMBER:  [id number]  AGE: 61 y.o. GENDER: male  : 1965  EPISODE DATE:  2/15/2023       Mr. Cornelius Almeida was referred to [facility] Medication Management Services by Dr. Carlo Crowley, Special instructions include: titrate all medications (as defined in clinic's policy and procedure)    Patient seen in office. Goals per referral:   Fasting blood glucose: < 130  Peak postprandial glucose: < 180  A1C: < 7    Other goals:  Blood pressure goal: 130/80  Weight loss goal (~10%): Target weight  410 to be reached by date: 2023 (3-6 months)  Physical Activity goal:    Discussed but nothing formal at this time and no specific goals set  Smoking Cessation   Quit Date: 23  Cholesterol at target:   Date: Yes  22  Annual eye exam:    Date: 2023  Comprehensive annual foot exam:   Date:  2022  Annual urine Albumin and serum creatinine:   Date:  microalbumin <15 mg/L (23, SrCr 1.59mg/dL (23) eGFR 50 ml/min        Subjective   Mr. Cornelius Almeida is a 61 y.o. male here for the Diabetes Service for self-management education, medication review including over the counter medications and herbal products, overall well-being assessment, transition of care and any needed adjustments with updates and recommendations communicated to the referring physician. Patient's name and  verified. Patient Findings:    CT of lungs    Patient changed from [facility] to [facility] G6 about 3 weeks ago. Was having issues with Freestyle not working and needle on Chew was not comfortable. States Dexcom is working well and is much more comfortable. Patient has on on back on left arm.   Discussed intended per Dexcom instructions to use on his abdomen but patient states he is not comfortable using it on his abdomen due to current/chronic abdominal discomfort and what he states is tough skin on abdomen. Patient will continue to use Dexcom on back of arm. Patient still has Glucometer and discussed need for backup with blood sugars that dont match his symptoms, when Dexcom warming up after replacement and when Dexcom not working for any reason. Patient has enough glucose test strips and lancets. Dexcom sensor will change tomorrow per patient. Patient understands how to replace/change sensors. Understands how to change remove/change transmitter. Patient states he has enough sensors and also has another transmitter at home for when that needs changed (3 month intervals). Saw Dr. Chun Treviño about 2 weeks ago. Reduced his dinner U500 dose to 0.1ml due to hypoglycemia during the middle of the night. Review of Dexcom data still shows his blood sugars going low to hypoglycemic about 1/2am.  A1c in endocrinology office on 1/12/23 reported at 9.5. Patient will see Dr. Chun Treviño (2/23/23) next week due to elevated A1c. Patient taking Humulin R-500 0.75ml with breakfasts, 0.55ml with lunch (if eats) and 0.1ml with dinner. Admits he sometimes but not frequently reduces the breakfast insulin by about half if feels blood sugar is too low. Discussed and asked patient what he considers too low and he states when about 130 for example. Encouraged patient to avoid self adjustment but also asked him to note if he feels he has to do this. Patient eats supper at 7/7:30pm and snack about 9pm.  Typically snacks on popcorn or fruit (strawberries, grapes, bananas). Admits he has been skipping his snack at times in effort to reduce weight. Explained that his snack at 9pm is helping his blood sugar not go too low over night. Encouraged patient to NOT skip his snack but to try to cut back in other meals.   Patient reports eating two pieces of bread with peanut butter or peanut butter and pretzels at 2-3am when he is woken up with his wife getting home from work and his dogs barking. Suggested he reduce this down to one piece of bread and peanut butter as example to reduce calories and lower weight. Stopped smoking cigarettes 7 days ago (no chantix). Patient also stopped smoking pot he states. Patient currently using oxygen. States he is eating healthier foods but still skipping lunch. Asked patient to provide more details on his Dexcom as to if he skips lunch/lunch time insulin, if he skips 9pm snack and if he takes a lower dose of insulin with breakfast.  Patient does not know how to document on Dexcom and is not interested at this time. States his daughter knows how and he is able to write down on paper also. Discussed that if he stops skipping his 9pm snack we may be able to increase his dinner insulin back to 0.2ml which may keep his blood sugar from going so high after dinner. In addition, after reviewing his blood sugar data along with instances discussed above may be able to improve blood sugar control by asking patient to eat a snack even in not full meal at lunch time and take insulin. No changes at this time until evaluate how blood sugar respond based on reported instances above and if hypoglycemia reduces with not skipping 9pm snack. Suggested patient discuss this with Dr. Nandini Lema at next appt depending on how next week goes. 1/16/23 saw dermatologist - took two biopsies. Using cream daily which is helping significantly. Patient reports having enough of all diabetic medications and testing supplies.      []  Missed doses   []  Emergency Room Visit    [x]  Medication changes  []  Hospitalization   [x]  Diet changes   []  Acute illness   []  Activity changes      Symptoms of hypoglycemia    []  None    []  Shakiness    [x]  Lightheadedness or dizziness   []  Confusion      []  Sweating   [x]  Other wakes up from sleeping       Symptoms of hyperglycemia -.    []  None   [x]  Frequent urination    []  Increased thirst   []  Other    Medication adverse reactions (none due to diabetic medicaitons)   [x]  None   []  Diarrhea / Nausea / Vomiting / Constipation / flatulence   []  Hypertension   []  Peripheral edema     []  Signs of infection   []  Headache   []  Vision changes   []  Increased cholesterol    []  Weight gain   []  Change in renal function   []  Increased potassium  []  Other          If recent hospital admission / ED visit, was this related to Diabetes No:Chest pain      Objective     PMHx:    Past Medical History:   Diagnosis Date    Acute kidney injury superimposed on CKD ([address] 4/10/2013    Acute on chronic congestive heart failure (HCC)     Acute on chronic kidney failure ([address] 07/20/2017    Acute on chronic respiratory failure ([address] 10/02/2018    Acute on chronic respiratory failure with hypoxia ([address] 9/30/2021    Adhesive capsulitis of left shoulder 03/25/2017    Anemia, normocytic normochromic 9/12/2021    Anxiety 10/02/2016    smokes marijuana for this    Arthropathy, unspecified, other specified sites 06/13/2013    Asthma     B12 deficiency     Bilateral lower leg cellulitis 02/17/2016    Blood in stool     CAD (coronary artery disease)     Cardiovascular stress test abnormal 2018    Cellulitis of both lower extremities 05/25/2017    Cellulitis of leg, left 07/20/2017    CHF (congestive heart failure), NYHA class III ([address] 08/14/2013    Chronic back pain     Chronic bronchitis (HCC)     Chronic headaches     was referred to neuro, testing scheduled    Chronic infective otitis externa 4/28/2017    Chronic kidney disease     Chronic malignant otitis externa of both ears 7/24/2020    Chronic respiratory failure ([address]     was on vent    Chronic ulcer of left leg, with fat layer exposed ([address] 02/22/2019    healed    Class 2 severe obesity due to excess calories with serious comorbidity and body mass index (BMI) of 35.0 to 35.9 in adult (HCC)     (BMI 35.0-39.9 without comorbidity)    COPD exacerbation ([address] 11/02/2016    Diabetic neuropathy ([address] 08/14/2013    Displacement of lumbar intervertebral disc without myelopathy 06/13/2013    Ear infection     RIGHT    Elevated troponin     Essential hypertension     Facial cellulitis 2012    Fall 03/25/2017    GERD (gastroesophageal reflux disease)     Head injury     Hearing loss in right ear     pencil pierced ear as a child    Hepatic steatosis 12/03/2015    History of general anesthesia complication     has woke up during surgery under anesthesia    History of rib fracture 12/03/2015    Chronic     Hyperlipidemia     Hypersomnia     can go multiple days without sleeping    Hypertension     Insomnia     Intolerance of continuous positive airway pressure (CPAP) ventilation 07/20/2017    Iron deficiency     Localized rash     gets frequently in axilla, groin, in any fold, on several topical treatments for this    Lymphedema of both lower extremities 4/27/2021    Magnesium deficiency     Mastoiditis of left side     Mixed conductive and sensorineural hearing loss of both ears 01/10/2017    Per ENT    Mixed type COPD (chronic obstructive pulmonary disease) ([address]     On home O2, multiple inhlaers, nebulizer    Moderate recurrent major depression ([address] 10/02/2016    Morbid obesity with BMI of 45.0-49.9, adult ([address] 06/16/2015    NSTEMI (non-ST elevated myocardial infarction) ([address]     On home oxygen therapy     3 Lpm prn    Open wound of groin 12/19/2018    healed     BARBY on CPAP     Osteoarthritis     Otitis externa of left ear     Pancreatitis chronic     Persistent depressive disorder 11/19/2019    Renal insufficiency     proteinuria    Seizures ([address] 6/27/2019    Severe depression ([address] 09/25/2013    Spinal stenosis of lumbar region without neurogenic claudication 01/06/2016    MRI lumbar 12/30/15 L3-L4: There is broad-based bulging disc which appears protruding left laterally causing flattening of the ventral thecal sac.  In addition, there is facet arthropathy with mild hypertrophic changes. There is borderline central canal stenosis with  evidence of moderate left neural foraminal narrowing and mild right neural foramina narrowing.    L4-L5: There is broad-based protrud    Syncope 2017    Tinnitus of both ears 01/10/2017    Per ENT    Type 2 diabetes mellitus with stage 3 chronic kidney disease, with long-term current use of insulin ([facility] 2016    due to underlying condition with hyperosmolarity without coma    Type II or unspecified type diabetes mellitus without mention of complication, not stated as uncontrolled     uncontrolled    Uncontrolled type 2 diabetes mellitus with hyperglycemia ([facility] 7/15/2013    Unstable angina ([facility] 2021    Vitamin D deficiency     Wears glasses     for reading       Social History:    Social History     Tobacco Use    Smoking status: Former     Packs/day: 2.50     Years: 43.00     Pack years: 107.50     Types: Cigarettes     Start date: 1985     Quit date: 2023     Years since quittin.0    Smokeless tobacco: Former     Types: Snuff     Quit date: 1995    Tobacco comments:     SMOKING HISTORY UPDATED FROM 1/10/23 LUNG SCREENING ORDER   Substance Use Topics    Alcohol use: No     Alcohol/week: 0.0 standard drinks       Pertinent Labs:    Lab Results   Component Value Date    LABA1C 9.5 2023    LABA1C 8.8 2022    LABA1C 7.8 (H) 2022     Lab Results   Component Value Date    CHOL 151 2022    TRIG 336 (H) 2022    HDL 33 (L) 2022     Lab Results   Component Value Date    CREATININE 1.59 (H) 2023    BUN 44 (H) 2023     2023    K 3.6 (L) 2023    CL 93 (L) 2023    CO2 35 (H) 2023     Lab Results   Component Value Date/Time    ALT 21 2023 02:47 PM         Wt Readings from Last 3 Encounters:   23 (!) 435 lb 0.2 oz (197.3 kg)   23 (!) 440 lb (199.6 kg)   23 (!) 435 lb 0.2 oz (197.3 kg)       Current medications:  Prior to Admission medications    Medication Sig Start Date End Date Taking? Authorizing Provider   amitriptyline (ELAVIL) 50 MG tablet TAKE ONE TABLET BY MOUTH EVERY NIGHT AT BEDTIME 1/31/23   NICOLE Baltazar CNP   oxyCODONE HCl (OXY-IR) 10 MG immediate release tablet Take 1 tablet by mouth every 8 hours as needed for Pain for up to 30 days. 1/25/23 2/24/23  Madison Hatchet, MD   chlorhexidine (HIBICLENS) 4 % external liquid Mix 4 oz solution in a bathtub full of water and have baths daily for 1 week, then weekly, for decontamination 1/25/23   Madison Hatchet, MD   mupirocin (BACTROBAN) 2 % ointment  1/2/23   Historical Provider, MD   potassium chloride (KLOR-CON) 10 MEQ extended release tablet  12/19/22   Historical Provider, MD   KLOR-CON M20 20 MEQ extended release tablet  12/10/22   Historical Provider, MD   benzoyl peroxide 5 % external liquid Wash affected areas once daily 1/16/23   Speedy Batres MD   clindamycin (CLEOCIN T) 1 % lotion Apply to affected areas daily 1/16/23   Speedy Batres MD   clobetasol (TEMOVATE) 0.05 % ointment Apply to rash twice daily (not face, armpit or groin) 1/16/23   Speedy Batres MD   pantoprazole (PROTONIX) 40 MG tablet Take 1 tablet by mouth every morning (before breakfast) 1/3/23   Madison Hatchet, MD   magnesium oxide (MAG-OX) 400 (240 Mg) MG tablet Take 1 tablet by mouth 2 times daily Take with food 1/3/23   Madison Hatchet, MD   albuterol sulfate HFA (PROVENTIL;VENTOLIN;PROAIR) 108 (90 Base) MCG/ACT inhaler Inhale 2 puffs into the lungs every 6 hours as needed for Wheezing or Shortness of Breath 1/3/23   Madison Hatchet, MD   insulin regular human (HUMULIN R) 500 UNIT/ML concentrated injection vial Inject into the skin See Admin Instructions 0.75 ml with breakfast, 0.55ml with lunch, 0.1ml with dinner.     Historical Provider, MD   gabapentin (NEURONTIN) 300 MG capsule TAKE ONE CAPSULE BY MOUTH THREE TIMES A DAY 12/27/22 4/7/23  Sheeba Powell MD ADVAIR -21 MCG/ACT inhaler INHALE TWO PUFFS BY MOUTH TWICE A DAY 12/21/22   Christine Jose MD   Potassium 99 MG TABS Take 4 tablets by mouth in the morning and at bedtime 12/19/22 3/19/23  Christine Jose MD   Insulin Pen Needle (PEN NEEDLES) 31G X 6 MM MISC Use with Humulin R U-500 Kwikpen to inject insulin 3 times a day. (Fill preferred brand/generic and amount covered by insurance.) 12/16/22   Christine Jose MD   FEROSUL 325 (65 Fe) MG tablet TAKE ONE TABLET BY MOUTH DAILY 12/5/22   Christine Jose MD   metoprolol tartrate (LOPRESSOR) 50 MG tablet TAKE ONE TABLET BY MOUTH TWICE A DAY 11/21/22   Christine Jose MD   venlafaxine (EFFEXOR XR) 150 MG extended release capsule Take 1 capsule by mouth every morning Interact with amitriptyline, please monitor for serotonin syndrome symptoms 11/21/22   Christine Jose MD   Insulin Syringe-Needle U-100 (BD INSULIN SYRINGE U/F) 31G X 5/16\" 1 ML MISC USE TO SUBCUTANEOUSLY INJECT INSULIN THREE TIMES A DAY 11/7/22   Christine Jose MD   tiZANidine (ZANAFLEX) 4 MG tablet Take 1 tablet by mouth every 8 hours as needed (Back pain and muscle spasms) 10/31/22   Christine Jsoe MD   vitamin B-12 (CYANOCOBALAMIN) 500 MCG tablet Take 1 tablet by mouth daily 10/24/22   Christine Jose MD   ketoconazole (NIZORAL) 2 % cream Apply twice a day for yeast infection in the skin folds, for 4 weeks 10/24/22   Christine Jose MD   clobetasol (TEMOVATE) 0.05 % ointment Apply topically 2 times daily for psoriasis 10/24/22   Christine Jose MD   ammonium lactate (LAC-HYDRIN) 12 % lotion Apply topically daily. 10/24/22   Christine Jose MD   nystatin (MYCOSTATIN) 188104 UNIT/GM powder Apply 2 times daily in the skin folds for long term.  10/24/22   Christine Jose MD   docusate sodium (COLACE) 100 MG capsule Take 1 capsule by mouth 2 times daily For constipation 10/10/22   Christine Jose MD   bumetanide (BUMEX) 1 MG tablet Take 1 tablet by mouth 2 times daily Dose decreased 9/26/2022 due to acute kidney injury 9/26/22   Mary Gomez MD   allopurinol (ZYLOPRIM) 300 MG tablet Take 1 tablet by mouth daily For gout, stop medication if any rash develops 9/26/22   Mary Gomez MD   metOLazone (ZAROXOLYN) 2.5 MG tablet Take 1 tablet by mouth daily Take 30 mins before am Bumex dose 9/14/22   Estela Barahona MD   vitamin D3 (CHOLECALCIFEROL) 25 MCG (1000 UT) TABS tablet TAKE ONE TABLET BY MOUTH DAILY 9/6/22   Mary Gomez MD   blood glucose monitor strips Test 3 times a day & as needed for symptoms of irregular blood glucose. Dispense sufficient amount for indicated testing frequency plus additional to accommodate PRN testing needs. One touch Ultra blue. To be used if Chew not working or to double check sugars.  9/6/22   Mary Gomez MD   Respiratory Therapy Supplies (NEBULIZER/TUBING/MOUTHPIECE) KIT Dx COPD needs nebulizer supplies 8/22/22   Paige Bryant MD   Continuous Blood Gluc Sensor (FREESTYLE CRISTINE 14 DAY SENSOR) [facility] USE AS DIRECTED TO MONITOR BLOOD SUGAR AND CHANGE EVERY 14 DAYS 7/28/22   Paige Bryant MD   albuterol (PROVENTIL) (2.5 MG/3ML) 0.083% nebulizer solution USE THREE MILLILITERS ( ONE VIAL) VIA NEBULIZATION BY MOUTH EVERY 6 HOURS AS NEEDED FOR WHEEZING OR FOR SHORTNESS OF BREATH 7/11/22   Mary Gomez MD   rosuvastatin (CRESTOR) 10 MG tablet Take 1 tablet by mouth nightly Stop pravastatin 5/9/22   Mary Gomez MD   naloxone 4 MG/0.1ML LIQD nasal spray 1 spray by Nasal route as needed for Opioid Reversal Patient needs counseling 2/27/22   Mary Gomez MD   SPIRIVA RESPIMAT 2.5 MCG/ACT AERS inhaler INHALE TWO PUFFS BY MOUTH DAILY 12/1/21   Mary Gomez MD   MUCUS RELIEF 600 MG extended release tablet  11/8/21   Historical Provider, MD Dylon Hanna 0.02-0.005 % SOLN  10/7/21   Historical Provider, MD   latanoprost (XALATAN) 0.005 % ophthalmic solution 1 drop nightly  Patient not taking: No sig reported    Historical Provider, MD   clopidogrel (PLAVIX) 75 MG tablet Take 1 tablet by mouth daily 8/11/21   Rodrigo Ch MD   nitroGLYCERIN (NITROSTAT) 0.4 MG SL tablet DISSOLVE 1 TAB UNDER TONGUE FOR CHEST PAIN - IF PAIN REMAINS AFTER 5 MIN, CALL 911 AND REPEAT DOSE. MAX 3 TABS IN 15 MINUTES 8/2/21   Rodrigo Ch MD   Lancets MISC Use to check blood sugar three times daily along with when necessary due to symptoms. 9/30/20   Josi Dupont MD   Oxygen Tubing MISC by Does not apply route DX COPD. chronic respiratory failure 3/26/20   Rodrigo Ch MD   ONE TOUCH ULTRASOFT LANCETS MISC Patient to test blood sugar up to 4 times daily. 11/7/19   Josi Dupont MD   Lancet Devices (LANCING DEVICE) MISC Provide patient with lancing device appropriate for his machine/lancing needles. 6/4/19   Rodrigo Ch MD   Handicap Placard MISC by Does not apply route Can't walk greater than 200 feet. Expires in 5 years. 2/13/19   Paige Bryant MD   fluticasone (CUTIVATE) 0.05 % cream Apply topically 2 times daily  4/19/17   Historical Provider, MD   fluticasone (FLONASE) 50 MCG/ACT nasal spray 2 sprays by Nasal route daily  Patient taking differently: 2 sprays by Nasal route daily as needed (sinus symptoms) 1/16/17   Rodrigo Ch MD   Melatonin 10 MG TABS Take 10 mg by mouth nightly as needed (insomnia) 12/23/16   Rodrigo Ch MD   aspirin 81 MG EC tablet Take 81 mg by mouth daily.     Historical Provider, MD       Immunizations:   Most Recent Immunizations   Administered Date(s) Administered    COVID-19, MODERNA BLUE border, Primary or Immunocompromised, (age 12y+), IM, 100 mcg/0.5mL 12/15/2021    COVID-19, PFIZER GRAY top, DO NOT Dilute, (age 15 y+), IM, 30 mcg/0.3 mL 04/23/2022    DT (pediatric) 12/14/1998    Hepatitis B Adult (Engerix-B) 12/04/2019    Hepatitis B Adult (Recombivax HB) 12/04/2019    Influenza Virus Vaccine 10/12/2015 Influenza, FLUARIX, FLULAVAL, FLUZONE (age 10 mo+) AND AFLURIA, (age 1 y+), PF, 0.5mL 10/09/2020    Influenza, FLUCELVAX, (age 10 mo+), MDCK, PF, 0.5mL 10/11/2022    Pneumococcal Polysaccharide (Alnaobmaj10) 05/23/2013    Pneumococcal conjugate PCV20, PF (Prevnar 20) 04/23/2022    Tdap (Boostrix, Adacel) 09/12/2018    Zoster Recombinant (Shingrix) 02/14/2022       Home Blood Glucose Results:   Per patient's [employer] report below                       Assessment     A1c at goal:No: 9.5 (1/12/23) POC at endocrinology office. Blood Pressure at goal: Yes  Weight at goal: No:    Physical activity: No  Physical activity at goal: No    Smoking Cessation: Yes  Cholesterol at target: Yes  Annual eye exam completed: Yes  Comprehensive Foot Exam Completed: Yes  Annual urine albumin and serum creatinine: Yes    Statin: Yes    Appropriate?: Yes  Changes made: refill provided    ACE/ARB: Yes  Appropriate?: Yes  Changes made:     Aspirin: Yes  Appropriate?: Yes  Changes made:     Eating patterns:    [x]  My plate    []  Mediterranean diet   []  Low sodium   []  DASH diet   [x]  Portion control   [x]  Reduced calorie    []  Fast food / Restaurant  []  High glycemic index foods   []  Sugary beverages   []  Other     Comment: see above    Current Medications Affecting Diabetes:  Humulin R 500    Compliant:No  Barriers to medication therapy: anxiety / depression    Medications attempted in the past:  Metformin - cardiologist took him off but patient unsure why  Januvia - PCP took him off but patient unsure why    Recent exacerbations / new problems:       Last office dictation reviewed: yes        type 2 DM under worsening control as evidenced by blood sugars on Dexcom and elevating A1c. Blood sugar control does look slightly better in last two weeks since starting Dexcom. Plan   Counseling at today's visit:     -Continued monitoring of blood glucose with use of Dexcom G6  Call with any issues with sensor.   Bring data cord to each visit.      -Continue insulin to 0.75 at breakfast, 0.55 at lunch time, 0.1 with dinner. Do not skip your 9pm snack and try to only have one sandwich instead of two in middle of the night. Please jasmyn down:  When you take a lower dose of insulin at breakfast  When you skip lunch / lunch insulin  If you skip evening (9pm) snack    -Remember to check your feet at least once a day. Use a mirror if someone can not help you.     Physician Follow-up:   Dr Carmine Bender - 23    Medication Management Follow-up:   Diabetes Service   1  month    Electronically signed by Karen Lazaro on 2/15/2023 at 7:54 PM     For Pharmacy Admin Tracking Only    Program: Medication Management  CPA in place:  Yes  Recommendation Provided To: Patient/Caregiver: 3 via In person  Intervention Detail: Adherence Monitorin, Device Training, and Dose Adjustment: 1, reason: Therapy Optimization  Intervention Accepted By: Patient/Caregiver: 4  Time Spent (min): 45

## 2023-02-16 ENCOUNTER — HOSPITAL ENCOUNTER (OUTPATIENT)
Dept: CT IMAGING | Age: 58
Discharge: HOME | End: 2023-02-18

## 2023-02-16 DIAGNOSIS — Z87.891 PERSONAL HISTORY OF TOBACCO USE, PRESENTING HAZARDS TO HEALTH: ICD-10-CM

## 2023-02-16 DIAGNOSIS — F17.210 CIGARETTE SMOKER: ICD-10-CM

## 2023-02-23 ENCOUNTER — PATIENT MESSAGE (OUTPATIENT)
Dept: FAMILY MEDICINE CLINIC | Age: 58
End: 2023-02-23

## 2023-02-23 DIAGNOSIS — M48.061 SPINAL STENOSIS OF LUMBAR REGION WITHOUT NEUROGENIC CLAUDICATION: ICD-10-CM

## 2023-02-23 DIAGNOSIS — M51.36 DDD (DEGENERATIVE DISC DISEASE), LUMBAR: ICD-10-CM

## 2023-02-23 DIAGNOSIS — G89.29 CHRONIC MIDLINE LOW BACK PAIN WITH LEFT-SIDED SCIATICA: ICD-10-CM

## 2023-02-23 DIAGNOSIS — M96.1 POSTLAMINECTOMY SYNDROME OF LUMBAR REGION: ICD-10-CM

## 2023-02-23 DIAGNOSIS — M54.42 CHRONIC MIDLINE LOW BACK PAIN WITH LEFT-SIDED SCIATICA: ICD-10-CM

## 2023-02-23 RX ORDER — OXYCODONE HYDROCHLORIDE 10 MG/1
10 TABLET ORAL EVERY 8 HOURS PRN
Qty: 90 TABLET | Refills: 0 | Status: SHIPPED | OUTPATIENT
Start: 2023-02-23 | End: 2023-03-25

## 2023-02-23 NOTE — TELEPHONE ENCOUNTER
From: Master Bailey.   To: Dr. Esther Diaz: 2/23/2023 6:01 AM EST  Subject: Meds     Can you please Refill my OxyCODONE 10MG I will be out of them Saturday thank you very much

## 2023-02-23 NOTE — TELEPHONE ENCOUNTER
Please Approve or Refuse.   Send to Pharmacy per Pt's Request: sherri     Next Visit Date:  3/7/2023   Last Visit Date: 10/11/2022    Hemoglobin A1C (%)   Date Value   01/12/2023 9.5   09/26/2022 8.8   06/06/2022 7.8 (H)             ( goal A1C is < 7)   BP Readings from Last 3 Encounters:   02/15/23 (!) 122/51   02/06/23 134/68   01/16/23 (!) 142/54          (goal 120/80)  BUN   Date Value Ref Range Status   02/01/2023 44 (H) 6 - 20 mg/dL Final     Creatinine   Date Value Ref Range Status   02/01/2023 1.59 (H) 0.70 - 1.20 mg/dL Final     Potassium   Date Value Ref Range Status   02/01/2023 3.6 (L) 3.7 - 5.3 mmol/L Final

## 2023-02-24 DIAGNOSIS — M48.061 SPINAL STENOSIS OF LUMBAR REGION WITHOUT NEUROGENIC CLAUDICATION: ICD-10-CM

## 2023-02-24 DIAGNOSIS — M54.42 CHRONIC MIDLINE LOW BACK PAIN WITH LEFT-SIDED SCIATICA: ICD-10-CM

## 2023-02-24 DIAGNOSIS — M51.36 DDD (DEGENERATIVE DISC DISEASE), LUMBAR: ICD-10-CM

## 2023-02-24 DIAGNOSIS — M96.1 POSTLAMINECTOMY SYNDROME OF LUMBAR REGION: ICD-10-CM

## 2023-02-24 DIAGNOSIS — G89.29 CHRONIC MIDLINE LOW BACK PAIN WITH LEFT-SIDED SCIATICA: ICD-10-CM

## 2023-02-24 RX ORDER — OXYCODONE HYDROCHLORIDE 10 MG/1
10 TABLET ORAL EVERY 8 HOURS PRN
Qty: 90 TABLET | Refills: 0 | OUTPATIENT
Start: 2023-02-24 | End: 2023-03-26

## 2023-03-03 DIAGNOSIS — E55.9 VITAMIN D DEFICIENCY: ICD-10-CM

## 2023-03-03 DIAGNOSIS — M48.061 SPINAL STENOSIS OF LUMBAR REGION WITHOUT NEUROGENIC CLAUDICATION: ICD-10-CM

## 2023-03-03 DIAGNOSIS — G89.29 CHRONIC BACK PAIN GREATER THAN 3 MONTHS DURATION: ICD-10-CM

## 2023-03-03 DIAGNOSIS — M54.9 CHRONIC BACK PAIN GREATER THAN 3 MONTHS DURATION: ICD-10-CM

## 2023-03-04 RX ORDER — MELATONIN
Qty: 90 TABLET | Refills: 1 | Status: SHIPPED | OUTPATIENT
Start: 2023-03-04

## 2023-03-04 RX ORDER — TIZANIDINE 4 MG/1
TABLET ORAL
Qty: 90 TABLET | Refills: 3 | Status: SHIPPED | OUTPATIENT
Start: 2023-03-04

## 2023-03-04 NOTE — TELEPHONE ENCOUNTER
Please Approve or Refuse.   Send to Pharmacy per Pt's Request:      Next Visit Date:  3/7/2023   Last Visit Date: 10/11/2022    Hemoglobin A1C (%)   Date Value   01/12/2023 9.5   09/26/2022 8.8   06/06/2022 7.8 (H)             ( goal A1C is < 7)   BP Readings from Last 3 Encounters:   02/15/23 (!) 122/51   02/06/23 134/68   01/16/23 (!) 142/54          (goal 120/80)  BUN   Date Value Ref Range Status   02/01/2023 44 (H) 6 - 20 mg/dL Final     Creatinine   Date Value Ref Range Status   02/01/2023 1.59 (H) 0.70 - 1.20 mg/dL Final     Potassium   Date Value Ref Range Status   02/01/2023 3.6 (L) 3.7 - 5.3 mmol/L Final

## 2023-03-07 ENCOUNTER — OFFICE VISIT (OUTPATIENT)
Dept: FAMILY MEDICINE CLINIC | Age: 58
End: 2023-03-07

## 2023-03-07 ENCOUNTER — TELEPHONE (OUTPATIENT)
Dept: FAMILY MEDICINE CLINIC | Age: 58
End: 2023-03-07

## 2023-03-07 VITALS
HEIGHT: 72 IN | HEART RATE: 83 BPM | SYSTOLIC BLOOD PRESSURE: 138 MMHG | DIASTOLIC BLOOD PRESSURE: 87 MMHG | BODY MASS INDEX: 42.66 KG/M2 | WEIGHT: 315 LBS | TEMPERATURE: 97.6 F | OXYGEN SATURATION: 97 %

## 2023-03-07 DIAGNOSIS — E66.01 MORBID OBESITY WITH BMI OF 50.0-59.9, ADULT (HCC): ICD-10-CM

## 2023-03-07 DIAGNOSIS — J44.9 CHRONIC OBSTRUCTIVE PULMONARY DISEASE, UNSPECIFIED COPD TYPE (HCC): ICD-10-CM

## 2023-03-07 DIAGNOSIS — E11.42 TYPE 2 DIABETES MELLITUS WITH DIABETIC POLYNEUROPATHY, WITH LONG-TERM CURRENT USE OF INSULIN (HCC): ICD-10-CM

## 2023-03-07 DIAGNOSIS — I50.32 CHRONIC DIASTOLIC CONGESTIVE HEART FAILURE (HCC): ICD-10-CM

## 2023-03-07 DIAGNOSIS — Z79.4 TYPE 2 DIABETES MELLITUS WITH DIABETIC POLYNEUROPATHY, WITH LONG-TERM CURRENT USE OF INSULIN (HCC): ICD-10-CM

## 2023-03-07 DIAGNOSIS — M96.1 POSTLAMINECTOMY SYNDROME OF LUMBAR REGION: ICD-10-CM

## 2023-03-07 DIAGNOSIS — Z51.81 MEDICATION MONITORING ENCOUNTER: ICD-10-CM

## 2023-03-07 DIAGNOSIS — R35.0 FREQUENCY OF URINATION: ICD-10-CM

## 2023-03-07 DIAGNOSIS — N43.3 HYDROCELE, BILATERAL: ICD-10-CM

## 2023-03-07 DIAGNOSIS — N18.30 BENIGN HYPERTENSIVE HEART AND CKD, STAGE 3 (GFR 30-59), W CHF (HCC): ICD-10-CM

## 2023-03-07 DIAGNOSIS — I13.0 BENIGN HYPERTENSIVE HEART AND CKD, STAGE 3 (GFR 30-59), W CHF (HCC): ICD-10-CM

## 2023-03-07 DIAGNOSIS — F33.1 MODERATE EPISODE OF RECURRENT MAJOR DEPRESSIVE DISORDER (HCC): ICD-10-CM

## 2023-03-07 DIAGNOSIS — L85.3 DRY SKIN DERMATITIS: ICD-10-CM

## 2023-03-07 DIAGNOSIS — J96.11 CHRONIC RESPIRATORY FAILURE WITH HYPOXIA (HCC): ICD-10-CM

## 2023-03-07 DIAGNOSIS — E78.5 HYPERLIPIDEMIA WITH TARGET LDL LESS THAN 70: ICD-10-CM

## 2023-03-07 DIAGNOSIS — Z00.00 MEDICARE ANNUAL WELLNESS VISIT, SUBSEQUENT: Primary | ICD-10-CM

## 2023-03-07 RX ORDER — ALBUTEROL SULFATE 2.5 MG/3ML
SOLUTION RESPIRATORY (INHALATION)
Qty: 360 ML | Refills: 3 | Status: SHIPPED | OUTPATIENT
Start: 2023-03-07

## 2023-03-07 RX ORDER — AMMONIUM LACTATE 12 G/100G
CREAM TOPICAL
Qty: 385 G | Refills: 4 | Status: SHIPPED | OUTPATIENT
Start: 2023-03-07 | End: 2023-03-07

## 2023-03-07 RX ORDER — AMMONIUM LACTATE 12 G/100G
CREAM TOPICAL
Qty: 385 G | Refills: 4 | Status: SHIPPED | OUTPATIENT
Start: 2023-03-07

## 2023-03-07 ASSESSMENT — PATIENT HEALTH QUESTIONNAIRE - PHQ9
10. IF YOU CHECKED OFF ANY PROBLEMS, HOW DIFFICULT HAVE THESE PROBLEMS MADE IT FOR YOU TO DO YOUR WORK, TAKE CARE OF THINGS AT HOME, OR GET ALONG WITH OTHER PEOPLE: 1
SUM OF ALL RESPONSES TO PHQ9 QUESTIONS 1 & 2: 3
SUM OF ALL RESPONSES TO PHQ QUESTIONS 1-9: 12
9. THOUGHTS THAT YOU WOULD BE BETTER OFF DEAD, OR OF HURTING YOURSELF: 0
2. FEELING DOWN, DEPRESSED OR HOPELESS: 3
SUM OF ALL RESPONSES TO PHQ QUESTIONS 1-9: 12
8. MOVING OR SPEAKING SO SLOWLY THAT OTHER PEOPLE COULD HAVE NOTICED. OR THE OPPOSITE, BEING SO FIGETY OR RESTLESS THAT YOU HAVE BEEN MOVING AROUND A LOT MORE THAN USUAL: 2
6. FEELING BAD ABOUT YOURSELF - OR THAT YOU ARE A FAILURE OR HAVE LET YOURSELF OR YOUR FAMILY DOWN: 3
3. TROUBLE FALLING OR STAYING ASLEEP: 0
SUM OF ALL RESPONSES TO PHQ QUESTIONS 1-9: 12
7. TROUBLE CONCENTRATING ON THINGS, SUCH AS READING THE NEWSPAPER OR WATCHING TELEVISION: 3
SUM OF ALL RESPONSES TO PHQ QUESTIONS 1-9: 12
5. POOR APPETITE OR OVEREATING: 0
4. FEELING TIRED OR HAVING LITTLE ENERGY: 1
1. LITTLE INTEREST OR PLEASURE IN DOING THINGS: 0

## 2023-03-07 ASSESSMENT — ENCOUNTER SYMPTOMS
APNEA: 1
NAUSEA: 0
ABDOMINAL DISTENTION: 0
CHEST TIGHTNESS: 0
VOMITING: 0
DIARRHEA: 0
CONSTIPATION: 0
BACK PAIN: 1
WHEEZING: 0
COUGH: 1
SHORTNESS OF BREATH: 1
ABDOMINAL PAIN: 1

## 2023-03-07 ASSESSMENT — LIFESTYLE VARIABLES
HOW MANY STANDARD DRINKS CONTAINING ALCOHOL DO YOU HAVE ON A TYPICAL DAY: PATIENT DOES NOT DRINK
HOW OFTEN DO YOU HAVE A DRINK CONTAINING ALCOHOL: NEVER

## 2023-03-07 NOTE — TELEPHONE ENCOUNTER
Noted. Thank you!   I will change and resent to the pharmacy    Future Appointments   Date Time Provider William Givensi   3/22/2023  8:50 AM [facility] MGMT [address] [facility]   4/7/2023  9:40 AM Estiven Quezada MD [address] Neurology -   4/27/2023 11:30 [facility]   6/1/2023  8:00 AM Alisa Peña MD [facility]   6/7/2023  2:00 PM [facility]   3/8/2024 10:00 AM Benjamin Martines MD fp [facility]

## 2023-03-07 NOTE — PROGRESS NOTES
Medicare Annual Wellness Visit    Miguel Sutton. is here for Medicare AWV, Other (FMLA), COPD, Diabetes, Hypertension, Congestive Heart Failure, Back Pain, and Urinary Frequency (Needs a urinal prescription)    Assessment & Plan   Medicare annual wellness visit, subsequent  Chronic respiratory failure with hypoxia (HCC)  -     MT OFFICE OUTPATIENT VISIT 25 MINUTES [[id number]]  Chronic obstructive pulmonary disease, unspecified COPD type (HCC)  -     albuterol (PROVENTIL) (2.5 MG/3ML) 0.083% nebulizer solution; USE THREE MILLILITERS ( ONE VIAL) VIA NEBULIZATION BY MOUTH EVERY 6 HOURS AS NEEDED FOR WHEEZING OR FOR SHORTNESS OF BREATH, Disp-360 mL, R-3Normal  -     XR CHEST (2 VW); Future  -     MT OFFICE OUTPATIENT VISIT 25 MINUTES [[id number]]  Chronic diastolic congestive heart failure (HCC)  -     CBC with Auto Differential; Future  -     Comprehensive Metabolic Panel; Future  -     Magnesium; Future  -     Phosphorus; Future  -     TSH; Future  -     Uric Acid; Future  -     Incontinence Supplies (MALE URINAL) MISC; Disp-30 each, R-3, PrintNeeds urinals to prevent falls  -     Brain Natriuretic Peptide; Future  -     MT OFFICE OUTPATIENT VISIT 25 MINUTES [[id number]]  Benign hypertensive heart and CKD, stage 3 (GFR 30-59), w CHF ([facility] [address]  -     CBC with Auto Differential; Future  -     Comprehensive Metabolic Panel; Future  -     Magnesium; Future  -     Phosphorus; Future  -     TSH; Future  -     Uric Acid; Future  -     Incontinence Supplies (MALE URINAL) MISC; Disp-30 each, R-3, PrintNeeds urinals to prevent falls  -     MT OFFICE OUTPATIENT VISIT 25 MINUTES [80092]  Type 2 diabetes mellitus with diabetic polyneuropathy, with long-term current use of insulin ([facility])  -     CBC with Auto Differential; Future  -     Comprehensive Metabolic Panel; Future  -     Magnesium; Future  -     TSH; Future  -     Vitamin B12 & Folate;  Future  -     MT OFFICE OUTPATIENT VISIT 25 MINUTES [[id number]]  Postlaminectomy syndrome of lumbar region  -     ND OFFICE OUTPATIENT VISIT 25 MINUTES [[id number]]  Hyperlipidemia with target LDL less than 70  -     Lipid Panel; Future  -     ND OFFICE OUTPATIENT VISIT 25 MINUTES [[id number]]  Frequency of urination  -     Incontinence Supplies (MALE URINAL) MISC; Disp-30 each, R-3, PrintNeeds urinals to prevent falls  -     ND OFFICE OUTPATIENT VISIT 25 MINUTES [[id number]]  Hydrocele, bilateral  -     Incontinence Supplies (MALE URINAL) MISC; Disp-30 each, R-3, PrintNeeds urinals to prevent falls  -     ND OFFICE OUTPATIENT VISIT 25 MINUTES [[id number]]  Dry skin dermatitis  -     ammonium lactate (LAC-HYDRIN) 12 % cream; Apply topically daily for dry skin on both legs, hands and arms, abdominal wall, Disp-385 g, R-4, Normal  -     ND OFFICE OUTPATIENT VISIT 25 MINUTES [[id number]]  Morbid obesity with BMI of 50.0-59.9, adult (HCC)  Moderate episode of recurrent major depressive disorder (HCC)  Medication monitoring encounter  -     DRUG SCREEN, PAIN; Future             Future Appointments   Date Time Provider William Hernandez   3/22/2023  8:50 AM [facility]ICATION [facility]   4/7/2023  9:40 AM Joshua Shah MD [address] Neurology -   4/27/2023 11:30 AM Morgan Castro MD  derm [facility]   6/1/2023  8:00 AM Sandra Cabello MD  Cancer Ct [facility]   6/7/2023  2:00 PM Mickey Essex, MD [facility]   3/8/2024 10:00 AM Mickey Essex, MD [facility]        Recommendations for Preventive Services Due: see orders and patient instructions/AVS.  Recommended screening schedule for the next 5-10 years is provided to the patient in written form: see Patient Instructions/AVS.     Return in 1 year (on 3/7/2024) for Medicare Annual Wellness Visit in 1 year, [facility], [address] [facility],  Marilu Diaz, Oxycontin. Subjective     Patient's complete Health Risk Assessment and screening values have been reviewed and are found in Flowsheets.  The following problems were reviewed today and where indicated follow up appointments were made and/or referrals ordered. Positive Risk Factor Screenings with Interventions:    Fall Risk:  Do you feel unsteady or are you worried about falling? : (!) yes  2 or more falls in past year?: no  Fall with injury in past year?: (!) yes     Interventions:    Patient declines any further evaluation or treatment  Patient reports having family members helping him, he says he will do home exercising, he declines home physical therapy, does not want strangers in his house     Depression:  PHQ-2 Score: 3  PHQ-9 Total Score: 12    Interpretation:   1-4 = minimal  5-9 = mild  10-14 = moderate  15-19 = moderately severe  20-27 = severe  Interventions:  Patient comments: Taking Amitryptiline and venlafaxine  Patient reports getting emotional support from his family members especially from Irene his daughter who comes with him at all the appointments, declines any additional interventions, declines counseling, does not want to talk to strangers.   He also uses THC Gummies at bedtime to relax         Drug Use:    DAST-10 Score: 2     Interpretation:  1-2: Low level - Monitor, re-assess at a later date  3-5: Moderate level - Further Investigation  6-8: Substantial level - Intensive Assessment  9-10: Severe level - Intensive Assessment    Interventions:  Patient comments: Patient reports he quit smoking marijuana which he has been doing for a long time, he has switched to [address] to help him relax at nighttime, he does not want to quit, he says it has been helping him         Opioid Risk: (High risk score ?55) Opioid risk score: 69    Last PDMP Lionel as Reviewed:  Review User Review Instant Review Result   [facility]CAR [address] 3/7/2023  9:32 AM @   Reviewed PDMP [1]     Last Controlled Substance Monitoring Documentation      [address] [facility] Office Visit from 3/7/2023 in [facility]   Periodic Controlled Substance Monitoring Possible medication side effects, risk of tolerance/dependence & alternative treatments discussed., No signs of potential drug abuse or diversion identified. , Assessed functional status. , Random urine drug screen sent today. filed at 03/07/2023 2060           Patient's daughter says they do have Narcan in the house, they know how to use it, they understand increased risk to stop breathing, due to his respiratory conditions and with opiates  He does not want to cut down on medication, it has been helping with pain to take the edge of it, patient says he is in pain all the time. Self-assessment of health:   In general, how would you say your health is?: (!) Poor    Interventions:  Patient comments: Patient reports he feels his health is in poor condition due to multiple conditions that he has, chronic respiratory failure, CHF, COPD, diabetes mellitus, chronic back pain, and other medical conditions  FMLA completed, labs ordered      Weight and Activity:  Physical Activity: Inactive    Days of Exercise per Week: 0 days    Minutes of Exercise per Session: 0 min     On average, how many days per week do you engage in moderate to strenuous exercise (like a brisk walk)?: 0 days  Have you lost any weight without trying in the past 3 months?: No  Body mass index: (!) 58.99      Inactivity Interventions:  Patient comments: He gets short of breath easily when exercising  We discussed senior sitting exercises, he is agreeable, he absolutely declines home physical therapy as he does not want anyone stranger to come in his house     Obesity Interventions:  Patient comments: Patient says he has been eating healthier  Discussed low-carb diet, and to start doing home sitting exercises           Hearing Screen:  Do you or your family notice any trouble with your hearing that hasn't been managed with hearing aids?: (!) Yes    Interventions:  Patient comments: seeing ENT, has hearing aids in  Patient reports going to the ENT      ADL's:   Patient reports needing help with:  Select all that apply: (!) Laundry, Banking/Finances, Shopping, Food Preparation, Telephone Use, Transportation  Interventions:  Patient comments:  family helping, Brayden Stern going to all the appointments with him, he also says the other daughter is also helping him  FMLA completed for his wife          Interventions:  Patient comments: Quit smoking both cigarettes and marijuana, currently only using edibles THC , praise given  FMLA completed for his wife      Cannot fit in the machines for CT due to weight per his daughter, never completed the CT lung screening    Patient's daughter says CT lung could not be completed because he is \"too big\"  Daughter says the answer from scheduling was \"NO\", they have tried both, [facility] and [facility] radiologist              Objective   Vitals:    03/07/23 0901   BP: 138/87   Pulse: 83   Temp: 97.6 °F (36.4 °C)   SpO2: 97%   Weight: (!) 435 lb (197.3 kg)   Height: 6' (1.829 m)      Body mass index is 59 kg/m². Vital signs within normal limits except morbid obesity per BMI      Allergies   Allergen Reactions    Levofloxacin Anaphylaxis     Patient reports needing epinephrine \"about 5 or 6 months ago\" for anaphylaxis (itching, hives, SOB/swelling) after receiving Levofloxacin. Previous report from 08/20/2012: Nausea/Vomiting    Lorazepam      Falls      Nsaids      CHF&CKD    Prozac [Fluoxetine Hcl] Other (See Comments)     Pt started with seizures after started taking. Vancomycin Other (See Comments)     Itching, SOB, emesis upon infusion in ED 6/12/2019. Patient states he has had vancomycin \"a number of times\" before without issue. couldn't breath and talk, throat closed     Prior to Visit Medications    Medication Sig Taking?  Authorizing Provider   vitamin D3 (CHOLECALCIFEROL) 25 MCG (1000 UT) TABS tablet TAKE ONE TABLET BY MOUTH DAILY Yes La Gomez MD   tiZANidine (ZANAFLEX) 4 MG tablet TAKE ONE TABLET BY MOUTH EVERY 8 HOURS AS NEEDED FOR PAIN AND MUSCLE SPASMS Yes Paige MD Manny   oxyCODONE HCl (OXY-IR) 10 MG immediate release tablet Take 1 tablet by mouth every 8 hours as needed for Pain for up to 30 days. Yes Vijay Kirby MD   amitriptyline (ELAVIL) 50 MG tablet TAKE ONE TABLET BY MOUTH EVERY NIGHT AT BEDTIME Yes NICOLE Rust CNP   chlorhexidine (HIBICLENS) 4 % external liquid Mix 4 oz solution in a bathtub full of water and have baths daily for 1 week, then weekly, for decontamination Yes Vijay Kirby MD   mupirocin (BACTROBAN) 2 % ointment  Yes Historical Provider, MD   potassium chloride (KLOR-CON) 10 MEQ extended release tablet   Historical Provider, MD   KLORTitaCON M20 20 MEQ extended release tablet   Historical Provider, MD   benzoyl peroxide 5 % external liquid Wash affected areas once daily Yes Alma Rosa Borja MD   clindamycin (CLEOCIN T) 1 % lotion Apply to affected areas daily Yes Alma Rosa Borja MD   clobetasol (TEMOVATE) 0.05 % ointment Apply to rash twice daily (not face, armpit or groin) Yes Alma Rosa Borja MD   pantoprazole (PROTONIX) 40 MG tablet Take 1 tablet by mouth every morning (before breakfast) Yes Vijay Kirby MD   magnesium oxide (MAG-OX) 400 (240 Mg) MG tablet Take 1 tablet by mouth 2 times daily Take with food Yes Vijay Kirby MD   albuterol sulfate HFA (PROVENTIL;VENTOLIN;PROAIR) 108 (90 Base) MCG/ACT inhaler Inhale 2 puffs into the lungs every 6 hours as needed for Wheezing or Shortness of Breath Yes Vijay Kirby MD   insulin regular human (HUMULIN R) 500 UNIT/ML concentrated injection vial Inject into the skin See Admin Instructions 0.75 ml with breakfast, 0.55ml with lunch, 0.1ml with dinner.  Yes Historical Provider, MD   gabapentin (NEURONTIN) 300 MG capsule TAKE ONE CAPSULE BY MOUTH THREE TIMES A DAY Yes Len Savage MD   [facility] 230-21 MCG/ACT inhaler INHALE TWO PUFFS BY MOUTH TWICE A DAY Yes Vijay Kirby MD   Potassium 99 MG TABS Take 4 tablets by mouth in the morning and at bedtime Yes José Luis Gannon MD   Insulin Pen Needle (PEN NEEDLES) 31G X 6 MM MISC Use with Humulin R U-500 Kwikpen to inject insulin 3 times a day. (Fill preferred brand/generic and amount covered by insurance.) Yes José Luis Gannon MD   FEROSUL 325 (65 Fe) MG tablet TAKE ONE TABLET BY MOUTH DAILY Yes José Luis Gannon MD   metoprolol tartrate (LOPRESSOR) 50 MG tablet TAKE ONE TABLET BY MOUTH TWICE A DAY Yes José Luis Gannon MD   venlafaxine (EFFEXOR XR) 150 MG extended release capsule Take 1 capsule by mouth every morning Interact with amitriptyline, please monitor for serotonin syndrome symptoms Yes José Luis Gannon MD   Insulin Syringe-Needle U-100 (BD INSULIN SYRINGE U/F) 31G X 5/16\" 1 ML MISC USE TO SUBCUTANEOUSLY INJECT INSULIN THREE TIMES A DAY Yes José Luis Gannon MD   vitamin B-12 (CYANOCOBALAMIN) 500 MCG tablet Take 1 tablet by mouth daily Yes José Luis Gannon MD   ketoconazole (NIZORAL) 2 % cream Apply twice a day for yeast infection in the skin folds, for 4 weeks  José Luis Gannon MD   clobetasol (TEMOVATE) 0.05 % ointment Apply topically 2 times daily for psoriasis  José Luis Gannon MD   ammonium lactate (LAC-HYDRIN) 12 % lotion Apply topically daily. José Luis Gannon MD   nystatin (MYCOSTATIN) 924871 UNIT/GM powder Apply 2 times daily in the skin folds for long term.   José Luis Gannon MD   docusate sodium (COLACE) 100 MG capsule Take 1 capsule by mouth 2 times daily For constipation Yes José Luis Gannon MD   bumetanide (BUMEX) 1 MG tablet Take 1 tablet by mouth 2 times daily Dose decreased 9/26/2022 due to acute kidney injury Yes José Luis Gannon MD   allopurinol (ZYLOPRIM) 300 MG tablet Take 1 tablet by mouth daily For gout, stop medication if any rash develops yes José Luis Gannon MD   metOLazone (ZAROXOLYN) 2.5 MG tablet Take 1 tablet by mouth daily Take 30 mins before am Bumex dose Yes Reinaldo Coe MD   blood glucose monitor strips Test 3 times a day & as needed for symptoms of irregular blood glucose. Dispense sufficient amount for indicated testing frequency plus additional to accommodate PRN testing needs. One touch Ultra blue. To be used if Chew not working or to double check sugars. yes Christine Jose MD   Respiratory Therapy Supplies (NEBULIZER/TUBING/MOUTHPIECE) KIT Dx COPD needs nebulizer supplies yes Christine Jose MD   Continuous Blood Gluc Sensor (FREESTYLE CRISTINE 14 DAY SENSOR) [facility] USE AS DIRECTED TO MONITOR BLOOD SUGAR AND CHANGE EVERY 14 DAYS  Paige Bryant MD   albuterol (PROVENTIL) (2.5 MG/3ML) 0.083% nebulizer solution USE THREE MILLILITERS ( ONE VIAL) VIA NEBULIZATION BY MOUTH EVERY 6 HOURS AS NEEDED FOR WHEEZING OR FOR SHORTNESS OF BREATH Yes Christine Jose MD   rosuvastatin (CRESTOR) 10 MG tablet Take 1 tablet by mouth nightly Stop pravastatin Yes Christine Jose MD   naloxone 4 MG/0.1ML LIQD nasal spray 1 spray by Nasal route as needed for Opioid Reversal Patient needs counseling Yes Christine Jose MD   SPIRIVA RESPIMAT 2.5 MCG/ACT AERS inhaler INHALE TWO PUFFS BY MOUTH DAILY Yes Christine Jose MD   MUCUS RELIEF 600 MG extended release tablet  Yes Historical Provider, MD   Margarita Bis 0.02-0.005 % SOLN  Yes Historical Provider, MD   latanoprost (XALATAN) 0.005 % ophthalmic solution 1 drop nightly  Patient not taking: No sig reported Yes Historical Provider, MD   clopidogrel (PLAVIX) 75 MG tablet Take 1 tablet by mouth daily Yes Christine Jose MD   nitroGLYCERIN (NITROSTAT) 0.4 MG SL tablet DISSOLVE 1 TAB UNDER TONGUE FOR CHEST PAIN - IF PAIN REMAINS AFTER 5 MIN, CALL 911 AND REPEAT DOSE. MAX 3 TABS IN 15 MINUTES Yes Christine Jose MD   Lancets MISC Use to check blood sugar three times daily along with when necessary due to symptoms. Yes Yanick Kelly MD   Oxygen Tubing MISC by Does not apply route DX COPD.  chronic respiratory failure Yes Christine Jose MD   [address] [facility] LANCETS MISC Patient to test blood sugar up to 4 times daily. Yes Michael Montalvo MD   Lancet Devices (LANCING DEVICE) MISC Provide patient with lancing device appropriate for his machine/lancing needles. Yes Awa Hernandes MD   Handicap Placard MISC by Does not apply route Can't walk greater than 200 feet. Expires in 5 years. Yes Awa Hernandes MD   fluticasone (CUTIVATE) 0.05 % cream Apply topically 2 times daily   Historical Provider, MD   fluticasone (FLONASE) 50 MCG/ACT nasal spray 2 sprays by Nasal route daily  Patient taking differently: 2 sprays by Nasal route daily as needed (sinus symptoms) Yes Paige Bryant MD   Melatonin 10 MG TABS Take 10 mg by mouth nightly as needed (insomnia) Yes Awa Hernandes MD   aspirin 81 MG EC tablet Take 81 mg by mouth daily.  Yes Historical Provider, MD       CareTeam (Including outside providers/suppliers regularly involved in providing care):   Patient Care Team:  Awa Hernandes MD as PCP - General (Family Medicine)  Awa Hernandes MD as PCP - Empaneled Provider  Michael Montalvo MD as Consulting Physician (Endocrinology)  Shasta Humphries DO as Consulting Physician (Cardiology)  Jazmyne Roldan DPM as Surgeon (Podiatry)  Ivett Carmen MD as Consulting Physician (Ophthalmology)  Elizabeth Barker MD as Consulting Physician (Nephrology)  Ziggy Canales MD as Surgeon (Vascular Surgery)  [facility] as Pharmacist (Pharmacist)  Minnie Arnold MD as Consulting Physician (Neurology)  Hollie Hutton MD as Consulting Physician (Gastroenterology)  NICOLE Conklin CNP as Nurse Practitioner (Otolaryngology)  Yesenia Huynh MD as Consulting Physician (Oncology)  Alexandrea Gallardo MD as Surgeon (Ophthalmology)  Marilee Decker MD as Consulting Physician (Urology)  Robel Hills MD as Consulting Physician (Pulmonology)  Mary Lou Kerr DO as Consulting Physician (Otolaryngology) Reviewed and updated this visit:  Tobacco  Allergies  Meds  Problems  Med Hx  Surg Hx  Soc Hx  Fam Hx        Electronically signed by Bora Torrez MD on 3/7/23 at 8:37 PM EST         Bora Torrez MD           Karyle Dy. (:  1965) is a 61 y.o. male,Established patient, here for evaluation of the following chief complaint(s): Medicare AWV, Other (FMLA), COPD, Diabetes, Hypertension, Congestive Heart Failure, Back Pain, and Urinary Frequency (Needs a urinal prescription)      ASSESSMENT/PLAN:    1. Medicare annual wellness visit, subsequent  2. Chronic respiratory failure with hypoxia (HCC)  Stable from prior  Continue oxygen at 3 L/min continuously, and noninvasive ventilator during nighttime  -     WA OFFICE OUTPATIENT VISIT 25 MINUTES [[id number]]  3. Chronic obstructive pulmonary disease, unspecified COPD type (HCC)  Worsening  Continue oxygen at 3 L/min, follow-up with Dr. Cammie Yanez as scheduled, noninvasive ventilator at night  Continue Advair 2 puffs twice a day and rinse mouth after use, Spiriva Respimat, albuterol every 6 hours as needed  -     albuterol (PROVENTIL) (2.5 MG/3ML) 0.083% nebulizer solution; USE THREE MILLILITERS ( ONE VIAL) VIA NEBULIZATION BY MOUTH EVERY 6 HOURS AS NEEDED FOR WHEEZING OR FOR SHORTNESS OF BREATH, Disp-360 mL, R-3Normal  -     XR CHEST (2 VW); Future  -     WA OFFICE OUTPATIENT VISIT 25 MINUTES [[id number]]  4. Chronic diastolic congestive heart failure (HCC)  Worsening  Lab Results   Component Value Date    LVEF 46 2021    LVEFMODE Echo 2017   Recheck labs  He says he is not seeing CHF clinic anymore  Follow-up with cardiologist as scheduled  Continue metoprolol 50 Mg twice a day Mg twice a day, metolazone 2.5 Mg daily, potassium 4 tablets twice a day      -     CBC with Auto Differential; Future  -     Comprehensive Metabolic Panel; Future  -     Magnesium; Future  -     Phosphorus; Future  -     TSH;  Future  -     Uric Acid; Future      -     Incontinence Supplies (MALE URINAL) MISC; Disp-30 each, R-3, PrintNeeds urinals to prevent falls  -     Brain Natriuretic Peptide; Future  -     LA OFFICE OUTPATIENT VISIT 25 MINUTES [[id number]]  5. Benign hypertensive heart and CKD, stage 3 (GFR 30-59), w CHF ([facility])  Stable chronic kidney disease stage III, GFR 50 on 2/1/2023, 51 on 11/15/2022  Well-controlled hypertension  Worsening CHF  Follow-up of appointment with nephrologist encouraged  Keep appointment with cardiologist as scheduled  Continue metoprolol 50 Mg twice a day Mg twice a day, metolazone 2.5 Mg daily, potassium 4 tablets twice a day  Check labs  -     CBC with Auto Differential; Future  -     Comprehensive Metabolic Panel; Future  -     Magnesium; Future  -     Phosphorus; Future  -     TSH; Future  -     Uric Acid; Future  -     Incontinence Supplies (MALE URINAL) MISC; Disp-30 each, R-3, PrintNeeds urinals to prevent falls  -     LA OFFICE OUTPATIENT VISIT 25 MINUTES [[id number]]  6. Type 2 diabetes mellitus with diabetic polyneuropathy, with long-term current use of insulin ([facility])  Worsening type 2 diabetes  Polyneuropathy improved with gabapentin 300 Mg 3 times a day per neurologist  He recently changed monitoring to Dexcom G6  He benefits from clinical pharmacist monitoring  He also follows with Dr. Valero Plant 500 per Dr. Angie Coelho and metformin were stopped due to chronic kidney disease stage III and acute kidney injury    Lab Results   Component Value Date    LABA1C 9.5 01/12/2023    LABA1C 8.8 09/26/2022    LABA1C 7.8 (H) 06/06/2022     Recheck labs  -     CBC with Auto Differential; Future  -     Comprehensive Metabolic Panel; Future  -     Magnesium; Future  -     TSH; Future  -     Vitamin B12 & Folate; Future  -     LA OFFICE OUTPATIENT VISIT 25 MINUTES [[id number]]  7.  Postlaminectomy syndrome of lumbar region  Stable  Chronic low back pain pain  Tizanidine 4 mg times a day as needed, oxycodone 10 Mg every 8 hours as needed, amitriptyline 50 Mg at night, stretching, repositioning, heating pad, also gabapentin 300 Mg 3 times a day  Declines home physical therapy  Risk of opiates for respiratory failure discussed, patient says he has Narcan in the house, Edmund Jamess says she knows how to use it, they understand the risks, she does not want to cut down, patient says he is in a lot of pain, and the list it takes the edge down  -     ND OFFICE OUTPATIENT VISIT 25 MINUTES [[id number]]  8. Hyperlipidemia with target LDL less than 70  Improved  Continue Crestor 10 Mg daily  Lab Results   Component Value Date    LDLCHOLESTEROL 51 09/26/2022    LDLDIRECT 60 09/12/2021       -     Lipid Panel; Future  -     ND OFFICE OUTPATIENT VISIT 25 MINUTES [[id number]]  9. Frequency of urination  Likely relating to diuretics  He would benefit from urinalysis, he already has been buying them  -     Incontinence Supplies (MALE URINAL) MISC; Disp-30 each, R-3, PrintNeeds urinals to prevent falls  -     ND OFFICE OUTPATIENT VISIT 25 MINUTES [[id number]]  10. Hydrocele, bilateral  Large, has seen urology, needs intervention which he declined  -     Incontinence Supplies (MALE URINAL) MISC; Disp-30 each, R-3, PrintNeeds urinals to prevent falls  -     ND OFFICE OUTPATIENT VISIT 25 MINUTES [[id number]]  11. Dry skin dermatitis  Worsening, generalized, also follows with dermatologist  -     ammonium lactate (LAC-HYDRIN) 12 % cream; Apply topically daily for dry skin on both legs, hands and arms, abdominal wall, Disp-385 g, R-4, Normal  -     ND OFFICE OUTPATIENT VISIT 25 MINUTES [[id number]]  12. Morbid obesity with BMI of 50.0-59.9, adult (HCC)  Stable  Wt Readings from Last 3 Encounters:   03/07/23 (!) 435 lb (197.3 kg)   02/08/23 (!) 435 lb 0.2 oz (197.3 kg)   02/06/23 (!) 440 lb (199.6 kg)     10/11/22 (!) 436 lb (197.8 kg)   Low-carb diet, low-fat diet, exercise discussed, he will start doing home sitting exercising    13.  Moderate episode of recurrent major depressive disorder [facility])  Stable  PHQ Scores 3/7/2023 3/2/2022 2/28/2022 2/23/2022 12/15/2021 7/20/2021 4/23/2021   PHQ2 Score 3 4 5 0 0 0 6   PHQ9 Score 12 10 10 0 0 0 13   Continue amitriptyline 50 Mg, venlafaxine 150 Mg daily  We will continue to monitor  He declines counseling  14. Medication monitoring encounter  -     DRUG SCREEN, PAIN; Future    FMLA done for patient's wife, for appointments and flareups, to help with his ADLs        Controlled Substance Monitoring:    Acute and Chronic Pain Monitoring:   RX Monitoring 3/7/2023   Attestation -   Periodic Controlled Substance Monitoring Possible medication side effects, risk of tolerance/dependence & alternative treatments discussed. ;No signs of potential drug abuse or diversion identified. ;Assessed functional status. ;Random urine drug screen sent today. Chronic Pain > 50 MEDD -   Chronic Pain > 80 MEDD -      Miguel received counseling on the following healthy behaviors: nutrition, exercise, medication adherence, and weight loss  Reviewed prior labs and health maintenance  Discussed use, benefit, and side effects of prescribed medications. Barriers to medication compliance addressed. Patient given educational materials - see patient instructions  All patient questions answered. Patient voiced understanding. The patient's past medical,surgical, social, and family history as well as his current medications and allergies were reviewed as documented in today's encounter. Medications, labs, diagnostic studies, consultations and follow-up as documented in this encounter. Return in 1 year (on 3/7/2024) for Medicare Annual Wellness Visit in 1 year, [facility], [address] [place] [address] Marilu Taylor Médicis, Oxycontin. Another appt in 3 mo extended, diabetes.  PLEASE UPDATE THE HIPPA FOR UlVimal Davenport 90 AND UPDATE IN DEMOGRAPHICS TOO    Future Appointments   Date Time Provider William Hernandez   3/22/2023  8:50 AM [facility]  [place]   4/7/2023  9:40 AM [facility]is[facility]   4/27/2023 11:30 AM Irwin Kwok MD  derm [facility]   6/1/2023  8:00 AM Claudell Morale, MD [facility]   6/7/2023  2:00 [facility]   3/8/2024 10:00 AM Enzo Good MD [facility]       SUBJECTIVE/OBJECTIVE:        Patient comes with his daughter Raj Flores! Patient has severe COPD, chronic respiratory failure on continuous oxygen at 3 l/min  Seeing Dr. Zainab Quiros every 3 months    Patient reports cough with yellow mucus in the morning, mucus becomes clear throughout the day. Patient reports dyspnea on exertion at baseline  Does not exert himself too much. Denies wheezing or chest pain  He is on noninvasive ventilator at nighttime, no CPAP anymore  He says he is not smoking any cigarettes anymore or marijuana, only edibles THC using due to anxiety. Has narcan in the house he knows how to use it  Pulse ox normal  SpO2 Readings from Last 4 Encounters:   03/07/23 97%   02/15/23 100%   01/24/23 94%   01/16/23 93%     Hypertension, congestive heart failure, coronary artery disease s/p 1 stent   he  is not exercising and is adherent to low salt diet. Blood pressure is well controlled at home. Cardiac symptoms dyspnea, fatigue, and orthopnea. Patient denies chest pain, chest pressure/discomfort, claudication, exertional chest pressure/discomfort, irregular heart beat, lower extremity edema, near-syncope, palpitations, paroxysmal nocturnal dyspnea, syncope, and tachypnea. Cardiovascular risk factors: advanced age (older than 54 for men, 72 for women), diabetes mellitus, dyslipidemia, hypertension, male gender, obesity (BMI >= 30 kg/m2), sedentary lifestyle, and smoking/ tobacco exposure. Use of agents associated with hypertension: none. History of target organ damage: angina/ prior myocardial infarction, chronic kidney disease, heart failure, and prior coronary revascularization.   Has 1 stent    His daughter says they are seeing  Alonso every 6 mo   Also seen nephrologist for the chronic kidney stage III  blood pressure is Normal.    BP Readings from Last 3 Encounters:   03/07/23 138/87   02/15/23 (!) 122/51   02/06/23 134/68        Pulse is Normal.    Pulse Readings from Last 3 Encounters:   03/07/23 83   02/15/23 78   02/06/23 80   Ejection fraction is decreasing    Patient says he is not going to congestive heart failure clinic anymore    Lab Results   Component Value Date    LVEF 46 03/26/2021    LVEFMODE Echo 08/29/2017           Morbid obesity per BMI  Weight is stable  Wt Readings from Last 3 Encounters:   03/07/23 (!) 435 lb (197.3 kg)   02/08/23 (!) 435 lb 0.2 oz (197.3 kg)   02/06/23 (!) 440 lb (199.6 kg)     10/11/22 (!) 436 lb (197.8 kg)       Type 2 diabetes mellitus with polyneuropathy  Seeing [facility] clinical pharmacist and Sathish Palmer and Dr. Yonny Call Blood Glucose fluctuating   \"When sick was going up and down\"  Has DEXCOM G 6, patient says [employer] was not reliable  Home blood glucose was 127 this morning. Patient says he had recent adjustments of the insulin, he is compliant with insulin regimen but sometimes missing food or snacks. Patient reports he had only \"2 lows in the 3 weeks otherwise doing better\"  A1c worsening    Lab Results   Component Value Date    LABA1C 9.5 01/12/2023    LABA1C 8.8 09/26/2022    LABA1C 7.8 (H) 06/06/2022     Depression  Stopped Prozac, interacted with Amitryptiline and gave him side effects. I told him it is okay  THC gummies for anxiety, due to his multiple comorbidities and prior use of THC for a long time  He does take Effexor. PHQ-2 Over the past 2 weeks, how often have you been bothered by any of the following problems? Little interest or pleasure in doing things: Not at all  Feeling down, depressed, or hopeless: Nearly every day  PHQ-2 Score: 3  PHQ-9 Over the past 2 weeks, how often have you been bothered by any of the following problems?   Trouble falling or staying asleep, or sleeping too much: Not at all  Feeling tired or having little energy: Several days  Poor appetite or overeating: Not at all  Feeling bad about yourself - or that you are a failure or have let yourself or your family down: Nearly every day  Trouble concentrating on things, such as reading the newspaper or watching television: Nearly every day  Moving or speaking so slowly that other people could have noticed. Or the opposite - being so fidgety or restless that you have been moving around a lot more than usual: More than half the days  Thoughts that you would be better off dead, or of hurting yourself in some way: Not at all  If you checked off any problems, how difficult have these problems made it for you to do your work, take care of things at home, or get along with other people?: Somewhat difficult  PHQ-9 Total Score: 12  PHQ-9 Total Score: 12    PHQ Scores 3/7/2023 3/2/2022 2/28/2022 2/23/2022 12/15/2021 7/20/2021 4/23/2021   PHQ2 Score 3 4 5 0 0 0 6   PHQ9 Score 12 10 10 0 0 0 13     chronic back pain for many years, he has history of 4 back surgeries, which did not improve his pain  Intensity of pain is 7/10 today, patient says oxycodone helps take the edge off of the pain. Also using muscle relaxant, repositioning, stretching, heating pad. He has a known diagnosis of spinal stenosis, but pain is in the midline and shooting down the left side, worse with activities. Hyperlipidemia:  No new myalgias or GI upset on rosuvastatin (Crestor). Medication compliance: compliant all of the time. Patient is  following a low fat, low cholesterol diet.     LDL is improved but very high triglycerides  Lab Results   Component Value Date    LDLCHOLESTEROL 51 09/26/2022    LDLDIRECT 60 09/12/2021     Lab Results   Component Value Date    TRIG 336 (H) 09/26/2022    TRIG 416 (H) 09/12/2021    TRIG 213 (H) 07/24/2021     Patient reports frequency of urination and hydrocele, making it difficult for him to wake up at night, having episodes of not making it on time, he would like urinal prescription, patient says he has been buying them at this time. Patient reports worsening dry skin everywhere and the skin gets hard  Sometimes itchy  Nothing works  Has been seeing dermatologist.  Denies itching. Social History     Tobacco Use    Smoking status: Former     Packs/day: 0.25     Years: 33.00     Pack years: 8.25     Types: Cigarettes     Start date: 1985     Quit date: 2020     Years since quittin.3    Smokeless tobacco: Former     Types: Snuff     Quit date: 1995    Tobacco comments:     SMOKING HISTORY UPDATED FROM 1/10/23 LUNG SCREENING ORDER, unable to do lung screening due to not fitting in the machine for Christine Underwood and patient on 3/7/2023. Patient reports he quit smoking again 2023, praise given   Vaping Use    Vaping Use: Never used   Substance Use Topics    Alcohol use: No    Drug use: Yes     Types: Marijuana Darryle Pimple)     Comment: Patient reports he quit using THC for 26 days on 2021, using THC gummies as of 22       Review of Systems   Constitutional:  Positive for fatigue. Negative for activity change, appetite change, chills, diaphoresis, fever and unexpected weight change. HENT:  Positive for hearing loss (has hearing aids). Eyes:  Positive for visual disturbance (stable, chronic). Respiratory:  Positive for apnea (on noninvasive ventilator instead of CPAP), cough and shortness of breath. Negative for chest tightness and wheezing. On continuous oxygen at 3 L/min per nasal cannula   Cardiovascular:  Negative for chest pain, palpitations and leg swelling. Gastrointestinal:  Positive for abdominal pain (chronic upper abdominal pain, he declines referral to GI). Negative for abdominal distention, constipation, diarrhea, nausea and vomiting. Endocrine: Negative for cold intolerance, heat intolerance, polydipsia, polyphagia and polyuria.    Genitourinary:  Positive for frequency, scrotal swelling (chronic hydrocele, previously seen by urologist) and urgency. Negative for testicular pain. Musculoskeletal:  Positive for back pain and gait problem. Comes in wheelchair   Skin:  Positive for rash. Very dry skin generalized   Allergic/Immunologic: Positive for immunocompromised state. Neurological:  Positive for numbness (feet). Negative for weakness. Hematological:  Does not bruise/bleed easily. Psychiatric/Behavioral:  Positive for dysphoric mood and sleep disturbance. Negative for self-injury and suicidal ideas. The patient is nervous/anxious.        -vital signs stable and within normal limits except morbid obesity per BMI    /87   Pulse 83   Temp 97.6 °F (36.4 °C)   Ht 6' (1.829 m)   Wt (!) 435 lb (197.3 kg)   SpO2 97%   BMI 59.00 kg/m²        Physical Exam  Vitals and nursing note reviewed. Constitutional:       General: He is not in acute distress. Appearance: Normal appearance. He is well-developed. He is obese. He is not diaphoretic. HENT:      Head: Normocephalic and atraumatic. Right Ear: External ear normal.      Left Ear: External ear normal.      Ears:      Comments: Has bilateral hearing aids     Mouth/Throat:      Comments: I did not examine the mouth due to coronavirus pandemic and wearing masks. Eyes:      General: Lids are normal. No scleral icterus. Right eye: No discharge. Left eye: No discharge. Extraocular Movements: Extraocular movements intact. Conjunctiva/sclera: Conjunctivae normal.   Neck:      Thyroid: No thyromegaly. Cardiovascular:      Rate and Rhythm: Normal rate and regular rhythm. Heart sounds: Heart sounds are distant. No murmur heard. Comments: Significant stasis dermatitis with extensive dry skin, but no cellulitis and no leg swelling today  Pulmonary:      Effort: Pulmonary effort is normal. No respiratory distress.       Breath sounds: Examination of the right-middle field reveals decreased breath sounds. Examination of the left-middle field reveals decreased breath sounds. Examination of the right-lower field reveals decreased breath sounds. Examination of the left-lower field reveals decreased breath sounds. Decreased breath sounds present. No wheezing or rales. Comments: On continuous oxygen at 3 l/min per nasal cannula. Pursed lips expiration, same as before  Chest:      Chest wall: No tenderness. Abdominal:      General: Bowel sounds are normal. There is no distension. Palpations: Abdomen is soft. There is no hepatomegaly or splenomegaly. Tenderness: There is abdominal tenderness in the epigastric area. There is no right CVA tenderness, left CVA tenderness, guarding or rebound. Comments: Obese abdomen, he declines referral to GI for stomach pain   Musculoskeletal:      Cervical back: Normal range of motion and neck supple. Lumbar back: Tenderness and bony tenderness present. Decreased range of motion. Positive right straight leg raise test and positive left straight leg raise test.      Right lower leg: No edema. Left lower leg: No edema. Lymphadenopathy:      Cervical: No cervical adenopathy. Skin:     General: Skin is warm and dry. Capillary Refill: Capillary refill takes less than 2 seconds. Findings: Rash present. Comments: Extensive dry skin on the legs, hands, abdominal wall, seeing dermatologist   Neurological:      Mental Status: He is alert and oriented to person, place, and time. Gait: Gait abnormal.      Deep Tendon Reflexes: Reflexes are normal and symmetric. Comments: Ambulates with wheelchair   Psychiatric:         Attention and Perception: Attention normal.         Mood and Affect: Mood is anxious. Speech: Speech is rapid and pressured. Behavior: Behavior normal.         Thought Content:  Thought content normal.         Judgment: Judgment normal.           I personally reviewed testing with patient.   Leukocytosis  Anemia of chronic disease, declines referral to GI  Hyperglycemia  Hypokalemia mild  High triglycerides    Otherwise labs within normal limits    Lab Results   Component Value Date    WBC 13.0 (H) 02/01/2023    HGB 12.3 (L) 02/01/2023    HCT 39.6 (L) 02/01/2023    MCV 85.2 02/01/2023     02/01/2023       Lab Results   Component Value Date/Time     02/01/2023 02:48 PM    K 3.6 02/01/2023 02:48 PM    CL 93 02/01/2023 02:48 PM    CO2 35 02/01/2023 02:48 PM    BUN 44 02/01/2023 02:48 PM    CREATININE 1.59 02/01/2023 02:48 PM    GLUCOSE 154 02/01/2023 02:48 PM    CALCIUM 10.0 02/01/2023 02:48 PM        Lab Results   Component Value Date    ALT 21 02/01/2023    AST 29 02/01/2023    ALKPHOS 107 02/01/2023    BILITOT 0.4 02/01/2023       Lab Results   Component Value Date    TSH 1.78 09/26/2022       Lab Results   Component Value Date    CHOL 151 09/26/2022    CHOL 137 09/12/2021    CHOL 121 07/24/2021     Lab Results   Component Value Date    TRIG 336 (H) 09/26/2022    TRIG 416 (H) 09/12/2021    TRIG 213 (H) 07/24/2021     Lab Results   Component Value Date    HDL 33 (L) 09/26/2022    HDL 30 (L) 09/12/2021    HDL 32 (L) 07/24/2021     Lab Results   Component Value Date    LDLCHOLESTEROL 51 09/26/2022    LDLCHOLESTEROL      09/12/2021    LDLCHOLESTEROL 46 07/24/2021     Lab Results   Component Value Date    CHOLHDLRATIO 4.6 09/26/2022    CHOLHDLRATIO 4.6 09/12/2021    CHOLHDLRATIO 3.8 07/24/2021       Lab Results   Component Value Date    LABA1C 9.5 01/12/2023       Lab Results   Component Value Date    WTQDFAEU64 1651 (H) 10/14/2022       Lab Results   Component Value Date    FOLATE 17.9 10/14/2022       Lab Results   Component Value Date    VITD25 36.4 10/14/2022         Orders Placed This Encounter   Procedures    XR CHEST (2 VW)     Standing Status:   Future     Standing Expiration Date:   3/6/2024     Order Specific Question:   Reason for exam:     Answer: Cough    DRUG SCREEN, PAIN     Standing Status:   Future     Standing Expiration Date:   5/7/2023     Scheduling Instructions:      Gabapentin and Oxycodone, THC    CBC with Auto Differential     Standing Status:   Future     Standing Expiration Date:   3/7/2024    Comprehensive Metabolic Panel     Standing Status:   Future     Standing Expiration Date:   3/7/2024    Magnesium     Standing Status:   Future     Standing Expiration Date:   3/7/2024    Phosphorus     Standing Status:   Future     Standing Expiration Date:   3/7/2024    TSH     Standing Status:   Future     Standing Expiration Date:   3/7/2024    Uric Acid     Standing Status:   Future     Standing Expiration Date:   3/7/2024    Vitamin B12 & Folate     Standing Status:   Future     Standing Expiration Date:   3/7/2024    Brain Natriuretic Peptide     Standing Status:   Future     Standing Expiration Date:   3/7/2024    Lipid Panel     Standing Status:   Future     Standing Expiration Date:   3/7/2024     Order Specific Question:   Is Patient Fasting?/# of Hours     Answer:   8-10 Hours, water ok to drink    RI OFFICE OUTPATIENT VISIT 25 MINUTES [80056]       Orders Placed This Encounter   Medications    albuterol (PROVENTIL) (2.5 MG/3ML) 0.083% nebulizer solution     Sig: USE THREE MILLILITERS ( ONE VIAL) VIA NEBULIZATION BY MOUTH EVERY 6 HOURS AS NEEDED FOR WHEEZING OR FOR SHORTNESS OF BREATH     Dispense:  360 mL     Refill:  3    Incontinence Supplies (MALE URINAL) MISC     Sig: Needs urinals to prevent falls     Dispense:  30 each     Refill:  3    DISCONTD: ammonium lactate (LAC-HYDRIN) 12 % cream     Sig: Apply topically as needed for dry skin     Dispense:  385 g     Refill:  4    ammonium lactate (LAC-HYDRIN) 12 % cream     Sig: Apply topically daily for dry skin on both legs, hands and arms, abdominal wall     Dispense:  385 g     Refill:  4       Medications Discontinued During This Encounter   Medication Reason    lidocaine 1 % injection 1 mL     albuterol (PROVENTIL) (2.5 MG/3ML) 0.083% nebulizer solution REORDER    ammonium lactate (LAC-HYDRIN) 12 % lotion REORDER    ammonium lactate (LAC-HYDRIN) 12 % cream     potassium chloride (KLOR-CON) 10 MEQ extended release tablet Alternate therapy    KLOR-CON M20 20 MEQ extended release tablet Alternate therapy    Continuous Blood Gluc Sensor (FREESTYLE CRISTINE 14 DAY SENSOR) MISC Alternate therapy    fluticasone (CUTIVATE) 0.05 % cream Alternate therapy           On this date 3/7/2023 I have spent 35 minutes reviewing previous notes, test results and face to face with the patient discussing the diagnosis and importance of compliance with the treatment plan as well as documenting on the day of the visit. This note was completed by using the assistance of a speech-recognition program. However, inadvertent computerized transcription errors may be present. Although every effort was made to ensure accuracy, no guarantees can be provided that every mistake has been identified and corrected by editing. An electronic signature was used to authenticate this note.   Electronically signed by Jessica Meyer MD on 3/7/2023 at 9:04 PM

## 2023-03-07 NOTE — PROGRESS NOTES
Visit Information    Have you changed or started any medications since your last visit including any over-the-counter medicines, vitamins, or herbal medicines? no   Have you stopped taking any of your medications? Is so, why? -  no  Are you having any side effects from any of your medications? - no    Have you seen any other physician or provider since your last visit? yes -    Have you had any other diagnostic tests since your last visit? NO   Have you been seen in the emergency room and/or had an admission in a hospital since we last saw you?  yes -    Have you had your routine dental cleaning in the past 6 months?  no     Do you have an active MyChart account? If no, what is the barrier?   Yes    Patient Care Team:  Isabela Young MD as PCP - General (Family Medicine)  Isabela Young MD as PCP - Empaneled Provider  Hanna Dominguez MD as Consulting Physician (Endocrinology)  Nikki Scott DO as Consulting Physician (Cardiology)  Cielo Castellanos DPM as Surgeon (Podiatry)  Artie Boast, MD as Consulting Physician (Ophthalmology)  Autumn Al MD as Consulting Physician (Nephrology)  Mario Escalera MD as Surgeon (Vascular Surgery)  Antoine Sanon [facility] as Pharmacist (Pharmacist)  Terrie Darby MD as Consulting Physician (Neurology)  Judy Mario MD as Consulting Physician (Gastroenterology)  NICOLE Stewart - CNP as Nurse Practitioner (Otolaryngology)  Nella Petersen MD as Consulting Physician (Oncology)  Lio Cai MD as Surgeon (Ophthalmology)  Jacek Rebollar MD as Consulting Physician (Urology)  Jonathan Huntley MD as Consulting Physician (Pulmonology)  Imani Pizano DO as Consulting Physician (Otolaryngology)    Medical History Review  Past Medical, Family, and Social History reviewed and does contribute to the patient presenting condition    Health Maintenance   Topic Date Due    Low dose CT lung screening  Never done COVID-19 Vaccine (5 - Booster for Moderna series) 06/18/2022    Depression Monitoring  03/02/2023    Annual Wellness Visit (AWV)  03/03/2023    A1C test (Diabetic or Prediabetic)  04/12/2023    Diabetic retinal exam  04/18/2023    Lipids  09/26/2023    Diabetic foot exam  10/11/2023    Diabetic Alb to Cr ratio (uACR) test  02/01/2024    GFR test (Diabetes, CKD 3-4, OR last GFR 15-59)  02/01/2024    Colorectal Cancer Screen  04/12/2024    DTaP/Tdap/Td vaccine (3 - Td or Tdap) 09/12/2028    Hepatitis B vaccine  Completed    Flu vaccine  Completed    Shingles vaccine  Completed    Pneumococcal 0-64 years Vaccine  Completed    Hepatitis C screen  Completed    HIV screen  Completed    Hepatitis A vaccine  Aged Out    Hib vaccine  Aged Out    Meningococcal (ACWY) vaccine  Aged Out

## 2023-03-07 NOTE — PATIENT INSTRUCTIONS
Preventing Falls: Care Instructions  Overview     Getting around your home safely can be a challenge if you have injuries or health problems that make it easy for you to fall. Loose rugs and furniture in walkways are among the dangers for many older people who have problems walking or who have poor eyesight. People who have conditions such as arthritis, osteoporosis, or dementia also have to be careful not to fall. You can make your home safer with a few simple measures. Follow-up care is a key part of your treatment and safety. Be sure to make and go to all appointments, and call your doctor if you are having problems. It's also a good idea to know your test results and keep a list of the medicines you take. How can you care for yourself at home? Taking care of yourself  Exercise regularly to improve your strength, muscle tone, and balance. Walk if you can. Swimming may be a good choice if you cannot walk easily. Have your vision and hearing checked each year or any time you notice a change. If you have trouble seeing and hearing, you might not be able to avoid objects and could lose your balance. Know the side effects of the medicines you take. Ask your doctor or pharmacist whether the medicines you take can affect your balance. Sleeping pills or sedatives can affect your balance. Limit the amount of alcohol you drink. Alcohol can impair your balance and other senses. Ask your doctor whether calluses or corns on your feet need to be removed. If you wear loose-fitting shoes because of calluses or corns, you can lose your balance and fall. Talk to your doctor if you have numbness in your feet. You may get dizzy if you do not drink enough water. To prevent dehydration, drink plenty of fluids. Choose water and other clear liquids. If you have kidney, heart, or liver disease and have to limit fluids, talk with your doctor before you increase the amount of fluids you drink.   Preventing falls at home  Remove raised doorway thresholds, throw rugs, and clutter. Repair loose carpet or raised areas in the floor. Move furniture and electrical cords to keep them out of walking paths. Use nonskid floor wax, and wipe up spills right away, especially on ceramic tile floors. If you use a walker or cane, put rubber tips on it. If you use crutches, clean the bottoms of them regularly with an abrasive pad, such as steel wool. Keep your house well lit, especially stairways, porches, and outside walkways. Use night-lights in areas such as hallways and bathrooms. Add extra light switches or use remote switches (such as switches that go on or off when you clap your hands) to make it easier to turn lights on if you have to get up during the night. Install sturdy handrails on stairways. Move items in your cabinets so that the things you use a lot are on the lower shelves (about waist level). Keep a cordless phone and a flashlight with new batteries by your bed. If possible, put a phone in each of the main rooms of your house, or carry a cell phone in case you fall and cannot reach a phone. Or, you can wear a device around your neck or wrist. You push a button that sends a signal for help. Wear low-heeled shoes that fit well and give your feet good support. Use footwear with nonskid soles. Check the heels and soles of your shoes for wear. Repair or replace worn heels or soles. Do not wear socks without shoes on smooth floors, such as wood. Walk on the grass when the sidewalks are slippery. If you live in an area that gets snow and ice in the winter, sprinkle salt on slippery steps and sidewalks. Or ask a family member or friend to do this for you. Preventing falls in the bath  Install grab bars and nonskid mats inside and outside your shower or tub and near the toilet and sinks. Use shower chairs and bath benches. Use a hand-held shower head that will allow you to sit while showering.   Get into a tub or shower by putting the weaker leg in first. Get out of a tub or shower with your strong side first.  Repair loose toilet seats and consider installing a raised toilet seat to make getting on and off the toilet easier. Keep your bathroom door unlocked while you are in the shower. Where can you learn more? Go to http://www.raines.com/ and enter G117 to learn more about \"Preventing Falls: Care Instructions. \"  Current as of: May 4, 2022               Content Version: 13.5  © 8161-4972 Healthwise, [employer]. Care instructions adapted under license by [facility] ([facility]). If you have questions about a medical condition or this instruction, always ask your healthcare professional. [address] any warranty or liability for your use of this information. Learning About Mindfulness for Stress  What are mindfulness and stress? Stress is what you feel when you have to handle more than you are used to. A lot of things can cause stress. You may feel stress when you go on a job interview, take a test, or run a race. This kind of short-term stress is normal and even useful. It can help you if you need to work hard or react quickly. Stress also can last a long time. Long-term stress is caused by stressful situations or events. Examples of long-term stress include long-term health problems, ongoing problems at work, and conflicts in your family. Long-term stress can harm your health. Mindfulness is a focus only on things happening in the present moment. It's a process of purposefully paying attention to and being aware of your surroundings, your emotions, your thoughts, and how your body feels. You are aware of these things, but you aren't judging these experiences as \"good\" or \"bad. \" Mindfulness can help you learn to calm your mind and body to help you cope with illness, pain, and stress. How does mindfulness help to relieve stress? Mindfulness can help quiet your mind and relax your body. Studies show that it can help some people sleep better, feel less anxious, and bring their blood pressure down. And it's been shown to help some people live and cope better with certain health problems like heart disease, depression, chronic pain, and cancer. How do you practice mindfulness? To be mindful is to pay attention, to be present, and to be accepting. When you're mindful, you do just one thing and you pay close attention to that one thing. For example, you may sit quietly and notice your emotions or how your food tastes and smells. When you're present, you focus on the things that are happening right now. You let go of your thoughts about the past and the future. When you dwell on the past or the future, you miss moments that can heal and strengthen you. You may miss moments like hearing a child laugh or seeing a friendly face when you think you're all alone. When you're accepting, you don't  the present moment. Instead you accept your thoughts and feelings as they come. You can practice anytime, anywhere, and in any way you choose. You can practice in many ways. Here are a few ideas:  While doing your chores, like washing the dishes, let your mind focus on what's in your hand. What does the dish feel like? Is the water warm or cold? Go outside and take a few deep breaths. What is the air like? Is it warm or cold? When you can, take some time at the start of your day to sit alone and think. Take a slow walk by yourself. Count your steps while you breathe in and out. Try yoga breathing exercises, stretches, and poses to strengthen and relax your muscles. At work, if you can, try to stop for a few moments each hour. Note how your body feels. Let yourself regroup and let your mind settle before you return to what you were doing. If you struggle with anxiety or \"worry thoughts,\" imagine your mind as a blue linsey and your worry thoughts as clouds.  Now imagine those worry thoughts floating across your mind's linsey. Just let them pass by as you watch. Follow-up care is a key part of your treatment and safety. Be sure to make and go to all appointments, and call your doctor if you are having problems. It's also a good idea to know your test results and keep a list of the medicines you take. Where can you learn more? Go to http://www.raines.com/ and enter M676 to learn more about \"Learning About Mindfulness for Stress. \"  Current as of: February 9, 2022               Content Version: 13.5  © 7704-4418 [employer]. Care instructions adapted under license by [facility] ([facility]). If you have questions about a medical condition or this instruction, always ask your healthcare professional. [address] any warranty or liability for your use of this information. Substance Use Disorder: Care Instructions  Overview     You can improve your life and health by stopping your use of alcohol or drugs. When you don't drink or use drugs, you may feel and sleep better. You may get along better with your family, friends, and coworkers. There are medicines and programs that can help with substance use disorder. How can you care for yourself at home? If you have been given medicine to help keep you sober or reduce your cravings, be sure to take it exactly as prescribed. Talk to your doctor about programs that can help you stop using drugs or drinking alcohol. Do not tempt yourself by keeping alcohol or drugs in your home. Learn how to say no when other people drink or use drugs. Or don't spend time with people who drink or use drugs. Use the time and money spent on drinking or drugs to do something fun with your family or friends. Preventing a relapse  Do not drink alcohol or use drugs at all. Using any amount of alcohol or drugs greatly increases your risk for relapse.   Seek help from organizations such as Alcoholics Anonymous, Narcotics Anonymous, or treatment facilities if you feel the need to drink alcohol or use drugs again. Remember that recovery is a lifelong process. Stay away from situations, friends, or places that may lead you to drink or use drugs. Have a plan to spot and deal with relapse. Learn to recognize changes in your thinking that lead you to drink or use drugs. These are warning signs. Get help before you start to drink or use drugs again. Get help as soon as you can if you relapse. Some people make a plan with another person that outlines what they want that person to do for them if they relapse. The plan usually includes how to handle the relapse and who to notify in case of relapse. Don't give up. Remember that a relapse does not mean that you have failed. Use the experience to learn the triggers that lead you to drink or use drugs. Then quit again. Many people have several relapses before they are able to quit for good. Follow-up care is a key part of your treatment and safety. Be sure to make and go to all appointments, and call your doctor if you are having problems. It's also a good idea to know your test results and keep a list of the medicines you take. When should you call for help? Call 911  anytime you think you may need emergency care. For example, call if:    You feel you cannot stop from hurting yourself or someone else. Call your doctor now or seek immediate medical care if:    You have serious withdrawal symptoms, such as confusion, hallucinations, severe trembling, or seizures. Watch closely for changes in your health, and be sure to contact your doctor if:    You have a relapse.     You need more help or support to stop. Where can you learn more? Go to http://www.raines.com/ and enter H573 to learn more about \"Substance Use Disorder: Care Instructions. \"  Current as of: August 2, 2022               Content Version: 13.5  © 4252-7496 Healthwise, Incorporated.    Care instructions adapted under license by [facility] ([facility]). If you have questions about a medical condition or this instruction, always ask your healthcare professional. [address] any warranty or liability for your use of this information. Learning About Emotional Support  When do you need emotional support? You might find getting support from others helpful when you have a long-term health problem. Often people feel alone, confused, or scared when coping with an illness. But you aren't alone. Other people are going through the same thing you are and know how you feel. Talking with others about your feelings can help you feel better. Your family and friends can give you support. So can your doctor, a support group, or a Bahai. If you have a support network, you will not feel as alone. You will learn new ways to deal with your situation, and you may try harder to overcome it. Where you can get support  Family and friends: They can help you cope by giving you comfort and encouragement. Counseling: Professional counseling can help you cope with situations that interfere with your life and cause stress. Counseling can help you understand and deal with your illness. Your doctor: Find a doctor you trust and feel comfortable with. Be open and honest about your fears and concerns. Your doctor can help you get the right medical treatments, including counseling. Spiritual or Zoroastrianism groups: They can provide comfort and may be able to help you find counseling or other social support services. Social groups: They can help you meet new people and get involved in activities you enjoy. Community support groups: In a support group, you can talk to others who have dealt with the same problems or illness as you. You can encourage one another and learn ways to cope with tough emotions. How to find a support group  Ask your doctor, counselor, or other health professional for suggestions.   Contact your local Bahai, Christian, Religion, or other Zoroastrianism group. Ask your family and friends. Ask people who have the same health concerns. Go online. Forums and blogs let you read messages from others and leave your own messages. You can exchange stories, vent your frustrations, and ask and answer questions. Contact a city, state, or national group that provides support for your health concerns. Your library or community center may have a list of these groups. Or you can look for information online. Look for a support group that works for you. Ask yourself if you prefer structure and would like a , or if you would like a less formal group. Do you prefer face-to-face meetings? Or do you feel more secure in online chat rooms or forums? Supportive relationships  A supportive relationship includes emotional support such as love, trust, and understanding, as well as advice and concrete help, such as help managing your time. Reach out to others  Family and friends can help you. Ask them to:  Listen to you and give you encouragement. This can keep you from feeling hopeless or alone. Help with small daily tasks or with bigger problems. A helping hand can keep you from feeling overwhelmed. Help you manage a health problem. For example, ask them to go to doctor visits with you. Your loved ones can offer support by being involved in your medical care. Respect your relationships  A good relationship is also a two-way street. You count on help from others, but they also count on you. Know your friends' limits. You don't have to see or call your friends every day. If you are going through a rough patch, ask friends if you can contact them outside of the usual boundaries. Don't always complain or talk about yourself. Know when it's time to stop talking and listen or just enjoy your friend's company. Know that good friends can be a bad influence.  For example, if a friend encourages you to drink when you know it will harm you, you may want to end the friendship. Where can you learn more? Go to http://www.woods.com/ and enter G092 to learn more about \"Learning About Emotional Support. \"  Current as of: February 9, 2022               Content Version: 13.5  © 7197-3855 Healthwise, Incorporated. Care instructions adapted under license by [facility] ([facility]). If you have questions about a medical condition or this instruction, always ask your healthcare professional. [address] any warranty or liability for your use of this information. Learning About Emotional Support  When do you need emotional support? You might find getting support from others helpful when you have a long-term health problem. Often people feel alone, confused, or scared when coping with an illness. But you aren't alone. Other people are going through the same thing you are and know how you feel. Talking with others about your feelings can help you feel better. Your family and friends can give you support. So can your doctor, a support group, or a Sabianist. If you have a support network, you will not feel as alone. You will learn new ways to deal with your situation, and you may try harder to overcome it. Where you can get support  Family and friends: They can help you cope by giving you comfort and encouragement. Counseling: Professional counseling can help you cope with situations that interfere with your life and cause stress. Counseling can help you understand and deal with your illness. Your doctor: Find a doctor you trust and feel comfortable with. Be open and honest about your fears and concerns. Your doctor can help you get the right medical treatments, including counseling. Spiritual or Jain groups: They can provide comfort and may be able to help you find counseling or other social support services. Social groups: They can help you meet new people and get involved in activities you enjoy.   Community support groups: In a support group, you can talk to others who have dealt with the same problems or illness as you. You can encourage one another and learn ways to cope with tough emotions. How to find a support group  Ask your doctor, counselor, or other health professional for suggestions. Contact your local Nondenominational, Samaritan, Yazidism, or other Episcopal group. Ask your family and friends. Ask people who have the same health concerns. Go online. Forums and blogs let you read messages from others and leave your own messages. You can exchange stories, vent your frustrations, and ask and answer questions. Contact a city, state, or national group that provides support for your health concerns. Your library or [employer] center may have a list of these groups. Or you can look for information online. Look for a support group that works for you. Ask yourself if you prefer structure and would like a , or if you would like a less formal group. Do you prefer face-to-face meetings? Or do you feel more secure in online chat rooms or forums? Supportive relationships  A supportive relationship includes emotional support such as love, trust, and understanding, as well as advice and concrete help, such as help managing your time. Reach out to others  Family and friends can help you. Ask them to:  Listen to you and give you encouragement. This can keep you from feeling hopeless or alone. Help with small daily tasks or with bigger problems. A helping hand can keep you from feeling overwhelmed. Help you manage a health problem. For example, ask them to go to doctor visits with you. Your loved ones can offer support by being involved in your medical care. Respect your relationships  A good relationship is also a two-way street. You count on help from others, but they also count on you. Know your friends' limits. You don't have to see or call your friends every day.  If you are going through a rough patch, ask friends if you can contact them outside of the usual boundaries. Don't always complain or talk about yourself. Know when it's time to stop talking and listen or just enjoy your friend's company. Know that good friends can be a bad influence. For example, if a friend encourages you to drink when you know it will harm you, you may want to end the friendship. Where can you learn more? Go to http://www.woods.com/ and enter G092 to learn more about \"Learning About Emotional Support. \"  Current as of: February 9, 2022               Content Version: 13.5  © 7935-0440 [employer]. Care instructions adapted under license by [facility] ([facility]). If you have questions about a medical condition or this instruction, always ask your healthcare professional. [address] any warranty or liability for your use of this information. Hearing Loss: Care Instructions  Overview     Hearing loss is a sudden or slow decrease in how well you hear. It can range from mild to severe. Permanent hearing loss can occur with aging. It also can happen when you are exposed long-term to loud noise. Examples include listening to loud music, riding motorcycles, or being around other loud machines. Hearing loss can affect your work and home life. It can make you feel lonely or depressed. You may feel that you have lost your independence. But hearing aids and other devices can help you hear better and feel connected to others. Follow-up care is a key part of your treatment and safety. Be sure to make and go to all appointments, and call your doctor if you are having problems. It's also a good idea to know your test results and keep a list of the medicines you take. How can you care for yourself at home? Avoid loud noises whenever possible. This helps keep your hearing from getting worse. Always wear hearing protection around loud noises. Wear a hearing aid as directed.  See a professional who can help you pick a hearing aid that fits you. Have hearing tests as your doctor suggests. They can show whether your hearing has changed. Your hearing aid may need to be adjusted. Use other devices as needed. These may include:  Telephone amplifiers and hearing aids that can connect to a television, stereo, radio, or microphone. Devices that use lights or vibrations. These alert you to the doorbell, a ringing telephone, or a baby monitor. Television closed-captioning. This shows the words at the bottom of the screen. Most new TVs can do this. TTY (text telephone). This lets you type messages back and forth on the telephone instead of talking or listening. These devices are also called TDD. When messages are typed on the keyboard, they are sent over the phone line to a receiving TTY. The message is shown on a monitor. Use text messaging, social media, and email if it is hard for you to communicate by telephone. Try to learn a listening technique called speechreading. It is not lipreading. You pay attention to people's gestures, expressions, posture, and tone of voice. These clues can help you understand what a person is saying. Face the person you are talking to, and have them face you. Make sure the lighting is good. You need to see the other person's face clearly. Think about counseling if you need help to adjust to your hearing loss. When should you call for help? Watch closely for changes in your health, and be sure to contact your doctor if:    You think your hearing is getting worse.     You have new symptoms, such as dizziness or nausea. Where can you learn more? Go to http://www.raines.com/ and enter R798 to learn more about \"Hearing Loss: Care Instructions. \"  Current as of: May 4, 2022               Content Version: 13.5  © 5567-4294 Healthwise, Incorporated. Care instructions adapted under license by [facility] ([facility]).  If you have questions about a medical condition or this instruction, always ask your healthcare professional. [place] [address] any warranty or liability for your use of this information. Learning About Activities of Daily Living  What are activities of daily living? Activities of daily living (ADLs) are the basic self-care tasks you do every day. As you age, and if you have health problems, you may find that it's harder to do these things for yourself. That's when you may need some help. Your doctor uses ADLs to measure how much help you need. Knowing what you can and can't do for yourself is an important first step to getting help. And when you have the help you need, you can stay as independent as possible. Your doctor will want to know if you are able to do tasks such as: Take a bath or shower without help. Go to the bathroom by yourself. Dress and undress without help. Shave, comb your hair, and brush teeth on your own. Get in and out of bed or a chair without help. Feed yourself without help. If you are having trouble doing basic self-care tasks, talk with your doctor. You may want to bring a caregiver or family member who can help the doctor understand your needs and abilities. How will a doctor assess your ADLs? Asking about ADLs is part of a routine health checkup your doctor will likely do as you age. Your health check might be done in a doctor's office, in your home, or at a hospital. The goal is to find out if you are having any problems that could make your health problems worse or that make it unsafe for you to be on your own. To measure your ADLs, your doctor will ask how hard it is for you to do routine tasks. He or she may also want to know if you have changed the way you do a task because of a health problem. He or she may watch how you:  Walk back and forth. Keep your balance while you stand or walk. Move from sitting to standing or from a bed to a chair. Button or unbutton a shirt or sweater.   Remove and put on your shoes.  It's normal to feel a little worried or anxious if you find you can't do all the things you used to be able to do. Talking with your doctor about ADLs isn't a test that you either pass or fail. It's just a way to get more information about your health and safety. Follow-up care is a key part of your treatment and safety. Be sure to make and go to all appointments, and call your doctor if you are having problems. It's also a good idea to know your test results and keep a list of the medicines you take. Current as of: October 6, 2021               Content Version: 13.5  © 2006-2022 [employer]. Care instructions adapted under license by [facility] ([facility]). If you have questions about a medical condition or this instruction, always ask your healthcare professional. [place] [address] any warranty or liability for your use of this information. Advance Directives: Care Instructions  Overview  An advance directive is a legal way to state your wishes at the end of your life. It tells your family and your doctor what to do if you can't say what you want. There are two main types of advance directives. You can change them any time your wishes change. Living will. This form tells your family and your doctor your wishes about life support and other treatment. The form is also called a declaration. Medical power of . This form lets you name a person to make treatment decisions for you when you can't speak for yourself. This person is called a health care agent (health care proxy, health care surrogate). The form is also called a durable power of  for health care. If you do not have an advance directive, decisions about your medical care may be made by a family member, or by a doctor or a  who doesn't know you. It may help to think of an advance directive as a gift to the people who care for you.  If you have one, they won't have to make tough decisions by themselves. For more information, including forms for your state, see the [address] website (www.caringinfo.org/planning/advance-directives/). Follow-up care is a key part of your treatment and safety. Be sure to make and go to all appointments, and call your doctor if you are having problems. It's also a good idea to know your test results and keep a list of the medicines you take. What should you include in an advance directive? Many states have a unique advance directive form. (It may ask you to address specific issues.) Or you might use a universal form that's approved by many states. If your form doesn't tell you what to address, it may be hard to know what to include in your advance directive. Use the questions below to help you get started. Who do you want to make decisions about your medical care if you are not able to? What life-support measures do you want if you have a serious illness that gets worse over time or can't be cured? What are you most afraid of that might happen? (Maybe you're afraid of having pain, losing your independence, or being kept alive by machines.)  Where would you prefer to die? (Your home? A hospital? A nursing home?)  Do you want to donate your organs when you die? Do you want certain Voodoo practices performed before you die? When should you call for help? Be sure to contact your doctor if you have any questions. Where can you learn more? Go to http://www.raines.com/ and enter R264 to learn more about \"Advance Directives: Care Instructions. \"  Current as of: June 16, 2022               Content Version: 13.5  © 4920-8063 Health[employer] Inc[employer]. Care instructions adapted under license by [employer]. If you have questions about a medical condition or this instruction, always ask your healthcare professional. [place] [address] any warranty or liability for your use of this information.            Starting a Weight Loss Plan: Care Instructions  Overview     If you're thinking about losing weight, it can be hard to know where to start. Your doctor can help you set up a weight loss plan that best meets your needs. You may want to take a class on nutrition or exercise, or you could join a weight loss support group. If you have questions about how to make changes to your eating or exercise habits, ask your doctor about seeing a registered dietitian or an exercise specialist.  It can be a big challenge to lose weight. But you don't have to make huge changes at once. Make small changes, and stick with them. When those changes become habit, add a few more changes. If you don't think you're ready to make changes right now, try to pick a date in the future. Make an appointment to see your doctor to discuss whether the time is right for you to start a plan. Follow-up care is a key part of your treatment and safety. Be sure to make and go to all appointments, and call your doctor if you are having problems. It's also a good idea to know your test results and keep a list of the medicines you take. How can you care for yourself at home? Set realistic goals. Many people expect to lose much more weight than is likely. A weight loss of 5% to 10% of your body weight may be enough to improve your health. Get family and friends involved to provide support. Talk to them about why you are trying to lose weight, and ask them to help. They can help by participating in exercise and having meals with you, even if they may be eating something different. Find what works best for you. If you do not have time or do not like to cook, a program that offers meal replacement bars or shakes may be better for you. Or if you like to prepare meals, finding a plan that includes daily menus and recipes may be best.  Ask your doctor about other health professionals who can help you achieve your weight loss goals.   A dietitian can help you make healthy changes in your diet.  An exercise specialist or  can help you develop a safe and effective exercise program.  A counselor or psychiatrist can help you cope with issues such as depression, anxiety, or family problems that can make it hard to focus on weight loss. Consider joining a support group for people who are trying to lose weight. Your doctor can suggest groups in your area. Where can you learn more? Go to http://www.woods.com/ and enter U357 to learn more about \"Starting a Weight Loss Plan: Care Instructions. \"  Current as of: August 25, 2022               Content Version: 13.5  © 7377-0194 [employer]. Care instructions adapted under license by [employer] [facility] ([facility]). If you have questions about a medical condition or this instruction, always ask your healthcare professional. [address] any warranty or liability for your use of this information. A Healthy Heart: Care Instructions  Your Care Instructions     Coronary artery disease, also called heart disease, occurs when a substance called plaque builds up in the vessels that supply oxygen-rich blood to your heart muscle. This can narrow the blood vessels and reduce blood flow. A heart attack happens when blood flow is completely blocked. A high-fat diet, smoking, and other factors increase the risk of heart disease. Your doctor has found that you have a chance of having heart disease. You can do lots of things to keep your heart healthy. It may not be easy, but you can change your diet, exercise more, and quit smoking. These steps really work to lower your chance of heart disease. Follow-up care is a key part of your treatment and safety. Be sure to make and go to all appointments, and call your doctor if you are having problems. It's also a good idea to know your test results and keep a list of the medicines you take. How can you care for yourself at home?   Diet    Use less salt when you cook and eat. This helps lower your blood pressure. Taste food before salting. Add only a little salt when you think you need it. With time, your taste buds will adjust to less salt.     Eat fewer snack items, fast foods, canned soups, and other high-salt, high-fat, processed foods.     Read food labels and try to avoid saturated and trans fats. They increase your risk of heart disease by raising cholesterol levels.     Limit the amount of solid fat-butter, margarine, and shortening-you eat. Use olive, peanut, or canola oil when you cook. Bake, broil, and steam foods instead of frying them.     Eat a variety of fruit and vegetables every day. Dark green, deep orange, red, or yellow fruits and vegetables are especially good for you. Examples include spinach, carrots, peaches, and berries.     Foods high in fiber can reduce your cholesterol and provide important vitamins and minerals. High-fiber foods include whole-grain cereals and breads, oatmeal, beans, brown rice, citrus fruits, and apples.     Eat lean proteins. Heart-healthy proteins include seafood, lean meats and poultry, eggs, beans, peas, nuts, seeds, and soy products.     Limit drinks and foods with added sugar. These include candy, desserts, and soda pop. Lifestyle changes    If your doctor recommends it, get more exercise. Walking is a good choice. Bit by bit, increase the amount you walk every day. Try for at least 30 minutes on most days of the week. You also may want to swim, bike, or do other activities.     Do not smoke. If you need help quitting, talk to your doctor about stop-smoking programs and medicines. These can increase your chances of quitting for good. Quitting smoking may be the most important step you can take to protect your heart. It is never too late to quit.     Limit alcohol to 2 drinks a day for men and 1 drink a day for women.  Too much alcohol can cause health problems.     Manage other health problems such as diabetes, high blood pressure, and high cholesterol. If you think you may have a problem with alcohol or drug use, talk to your doctor. Medicines    Take your medicines exactly as prescribed. Call your doctor if you think you are having a problem with your medicine.     If your doctor recommends aspirin, take the amount directed each day. Make sure you take aspirin and not another kind of pain reliever, such as acetaminophen (Tylenol). When should you call for help? Call 911 if you have symptoms of a heart attack. These may include:    Chest pain or pressure, or a strange feeling in the chest.     Sweating.     Shortness of breath.     Pain, pressure, or a strange feeling in the back, neck, jaw, or upper belly or in one or both shoulders or arms.     Lightheadedness or sudden weakness.     A fast or irregular heartbeat. After you call 911, the  may tell you to chew 1 adult-strength or 2 to 4 low-dose aspirin. Wait for an ambulance. Do not try to drive yourself. Watch closely for changes in your health, and be sure to contact your doctor if you have any problems. Where can you learn more? Go to http://[employer].raines.com/ and enter F075 to learn more about \"A Healthy Heart: Care Instructions. \"  Current as of: September 7, 2022               Content Version: 13.5  © 3876-6425 Healthwise, Incorporated. Care instructions adapted under license by [facility] ([facility]). If you have questions about a medical condition or this instruction, always ask your healthcare professional. [place] [address] any warranty or liability for your use of this information. Personalized Preventive Plan for Clementeen Click. - 3/7/2023  Medicare offers a range of preventive health benefits. Some of the tests and screenings are paid in full while other may be subject to a deductible, co-insurance, and/or copay.     Some of these benefits include a comprehensive review of your medical history including lifestyle, illnesses that may run in your family, and various assessments and screenings as appropriate. After reviewing your medical record and screening and assessments performed today your provider may have ordered immunizations, labs, imaging, and/or referrals for you. A list of these orders (if applicable) as well as your Preventive Care list are included within your After Visit Summary for your review. Other Preventive Recommendations:    A preventive eye exam performed by an eye specialist is recommended every 1-2 years to screen for glaucoma; cataracts, macular degeneration, and other eye disorders. A preventive dental visit is recommended every 6 months. Try to get at least 150 minutes of exercise per week or 10,000 steps per day on a pedometer . Order or download the FREE \"Exercise & Physical Activity: Your Everyday Guide\" from [employer]. Call 8-725.333.3748 or search [employer] online. You need 8080-1874 mg of calcium and 5372-0060 IU of vitamin D per day. It is possible to meet your calcium requirement with diet alone, but a vitamin D supplement is usually necessary to meet this goal.  When exposed to the sun, use a sunscreen that protects against both UVA and UVB radiation with an SPF of 30 or greater. Reapply every 2 to 3 hours or after sweating, drying off with a towel, or swimming. Always wear a seat belt when traveling in a car. Always wear a helmet when riding a bicycle or motorcycle.

## 2023-03-07 NOTE — TELEPHONE ENCOUNTER
Pharmacy called in advising that instructions for ammonium lactate (LAC-HYDRIN) 12 % cream needs to be revised to adv on frequency, instructions cannot state 'as needed'.

## 2023-03-20 NOTE — TELEPHONE ENCOUNTER
Pharmacy requesting refill of Neurontin 300 mg.       Medication active on med list: yes      Date of last fill: 12/27/22 for #90 and 1 refill  verified on 3/20/2023    verified by Promise Borges LPN      Date of last appointment: 2/11/2022    Next Visit Date:  4/7/2023

## 2023-03-22 ENCOUNTER — HOSPITAL ENCOUNTER (OUTPATIENT)
Dept: PHARMACY | Age: 58
Setting detail: THERAPIES SERIES
Discharge: HOME | End: 2023-03-22
Payer: COMMERCIAL

## 2023-03-22 VITALS — SYSTOLIC BLOOD PRESSURE: 169 MMHG | DIASTOLIC BLOOD PRESSURE: 52 MMHG | HEART RATE: 82 BPM | OXYGEN SATURATION: 99 %

## 2023-03-22 DIAGNOSIS — M96.1 POSTLAMINECTOMY SYNDROME OF LUMBAR REGION: ICD-10-CM

## 2023-03-22 DIAGNOSIS — M51.36 DDD (DEGENERATIVE DISC DISEASE), LUMBAR: ICD-10-CM

## 2023-03-22 DIAGNOSIS — M54.42 CHRONIC MIDLINE LOW BACK PAIN WITH LEFT-SIDED SCIATICA: ICD-10-CM

## 2023-03-22 DIAGNOSIS — M48.061 SPINAL STENOSIS OF LUMBAR REGION WITHOUT NEUROGENIC CLAUDICATION: ICD-10-CM

## 2023-03-22 DIAGNOSIS — G89.29 CHRONIC MIDLINE LOW BACK PAIN WITH LEFT-SIDED SCIATICA: ICD-10-CM

## 2023-03-22 PROCEDURE — 99213 OFFICE O/P EST LOW 20 MIN: CPT

## 2023-03-22 RX ORDER — OXYCODONE HYDROCHLORIDE 10 MG/1
10 TABLET ORAL EVERY 8 HOURS PRN
Qty: 90 TABLET | Refills: 0 | Status: SHIPPED | OUTPATIENT
Start: 2023-03-22 | End: 2023-04-21

## 2023-03-22 RX ORDER — GABAPENTIN 300 MG/1
CAPSULE ORAL
Qty: 90 CAPSULE | Refills: 0 | Status: SHIPPED | OUTPATIENT
Start: 2023-03-22 | End: 2023-04-19

## 2023-03-22 NOTE — DISCHARGE INSTRUCTIONS
[employer] customer service:  [phone]    Do not skip your 9pm snack if possible. Have something quick before going to bed if possible. Change / increase insulin to 0.85 at breakfast, 0.55 at lunch time, 0.25 with dinner.

## 2023-03-22 NOTE — PROGRESS NOTES
Patient's BP elevated today (systolic) but patient indicates he has not taken any of his medications today. Does check BP at home and states his systolic BP is usually lower. Will monitor.
scheduled    Chronic infective otitis externa 4/28/2017    Chronic kidney disease     Chronic malignant otitis externa of both ears 7/24/2020    Chronic respiratory failure ([address]     was on vent    Chronic ulcer of left leg, with fat layer exposed ([address] 02/22/2019    healed    Class 2 severe obesity due to excess calories with serious comorbidity and body mass index (BMI) of 35.0 to 35.9 in adult (HCC)     (BMI 35.0-39.9 without comorbidity)    COPD exacerbation ([address] 11/02/2016    Diabetic neuropathy ([address] 08/14/2013    Displacement of lumbar intervertebral disc without myelopathy 06/13/2013    Ear infection     RIGHT    Elevated troponin     Essential hypertension     Facial cellulitis 2012    Fall 03/25/2017    GERD (gastroesophageal reflux disease)     Head injury     Hearing loss in right ear     pencil pierced ear as a child    Hepatic steatosis 12/03/2015    History of general anesthesia complication     has woke up during surgery under anesthesia    History of rib fracture 12/03/2015    Chronic     Hyperlipidemia     Hypersomnia     can go multiple days without sleeping    Hypertension     Insomnia     Intolerance of continuous positive airway pressure (CPAP) ventilation 07/20/2017    Iron deficiency     Localized rash     gets frequently in axilla, groin, in any fold, on several topical treatments for this    Lymphedema of both lower extremities 4/27/2021    Magnesium deficiency     Mastoiditis of left side     Mixed conductive and sensorineural hearing loss of both ears 01/10/2017    Per ENT    Mixed type COPD (chronic obstructive pulmonary disease) ([address]     On home O2, multiple inhlaers, nebulizer    Moderate recurrent major depression ([address] 10/02/2016    Morbid obesity with BMI of 45.0-49.9, adult ([address] 06/16/2015    NSTEMI (non-ST elevated myocardial infarction) ([address]     On home oxygen therapy     3 Lpm prn    Open wound of groin 12/19/2018    healed     BARBY on CPAP     Osteoarthritis     Otitis

## 2023-03-29 DIAGNOSIS — J44.9 MIXED TYPE COPD (CHRONIC OBSTRUCTIVE PULMONARY DISEASE) (HCC): ICD-10-CM

## 2023-03-29 RX ORDER — TIOTROPIUM BROMIDE INHALATION SPRAY 3.12 UG/1
SPRAY, METERED RESPIRATORY (INHALATION)
Qty: 12 G | Refills: 11 | Status: SHIPPED | OUTPATIENT
Start: 2023-03-29

## 2023-03-31 DIAGNOSIS — I13.0 BENIGN HYPERTENSIVE HEART AND CKD, STAGE 3 (GFR 30-59), W CHF (HCC): ICD-10-CM

## 2023-03-31 DIAGNOSIS — I50.32 CHRONIC DIASTOLIC CONGESTIVE HEART FAILURE (HCC): ICD-10-CM

## 2023-03-31 DIAGNOSIS — N18.30 BENIGN HYPERTENSIVE HEART AND CKD, STAGE 3 (GFR 30-59), W CHF (HCC): ICD-10-CM

## 2023-04-03 RX ORDER — BUMETANIDE 1 MG/1
1 TABLET ORAL 2 TIMES DAILY
Qty: 180 TABLET | Refills: 3 | Status: SHIPPED | OUTPATIENT
Start: 2023-04-03

## 2023-04-03 NOTE — TELEPHONE ENCOUNTER
Please Approve or Refuse.   Send to Pharmacy per Pt's Request:      Next Visit Date:  6/7/2023   Last Visit Date: 3/7/2023    Hemoglobin A1C (%)   Date Value   01/12/2023 9.5   09/26/2022 8.8   06/06/2022 7.8 (H)             ( goal A1C is < 7)   BP Readings from Last 3 Encounters:   03/22/23 (!) 169/52   03/07/23 138/87   02/15/23 (!) 122/51          (goal 120/80)  BUN   Date Value Ref Range Status   02/01/2023 44 (H) 6 - 20 mg/dL Final     Creatinine   Date Value Ref Range Status   02/01/2023 1.59 (H) 0.70 - 1.20 mg/dL Final     Potassium   Date Value Ref Range Status   02/01/2023 3.6 (L) 3.7 - 5.3 mmol/L Final

## 2023-04-07 ENCOUNTER — OFFICE VISIT (OUTPATIENT)
Dept: NEUROLOGY | Age: 58
End: 2023-04-07
Payer: COMMERCIAL

## 2023-04-07 VITALS — HEIGHT: 73 IN | BODY MASS INDEX: 41.75 KG/M2 | WEIGHT: 315 LBS | OXYGEN SATURATION: 98 %

## 2023-04-07 DIAGNOSIS — M54.40 CHRONIC LOW BACK PAIN WITH SCIATICA, SCIATICA LATERALITY UNSPECIFIED, UNSPECIFIED BACK PAIN LATERALITY: ICD-10-CM

## 2023-04-07 DIAGNOSIS — G89.29 CHRONIC LOW BACK PAIN WITH SCIATICA, SCIATICA LATERALITY UNSPECIFIED, UNSPECIFIED BACK PAIN LATERALITY: ICD-10-CM

## 2023-04-07 DIAGNOSIS — E08.42 DIABETIC POLYNEUROPATHY ASSOCIATED WITH DIABETES MELLITUS DUE TO UNDERLYING CONDITION (HCC): ICD-10-CM

## 2023-04-07 DIAGNOSIS — G43.009 MIGRAINE WITHOUT AURA AND WITHOUT STATUS MIGRAINOSUS, NOT INTRACTABLE: Primary | ICD-10-CM

## 2023-04-07 PROCEDURE — 3017F COLORECTAL CA SCREEN DOC REV: CPT | Performed by: PSYCHIATRY & NEUROLOGY

## 2023-04-07 PROCEDURE — 1036F TOBACCO NON-USER: CPT | Performed by: PSYCHIATRY & NEUROLOGY

## 2023-04-07 PROCEDURE — 2022F DILAT RTA XM EVC RTNOPTHY: CPT | Performed by: PSYCHIATRY & NEUROLOGY

## 2023-04-07 PROCEDURE — G8427 DOCREV CUR MEDS BY ELIG CLIN: HCPCS | Performed by: PSYCHIATRY & NEUROLOGY

## 2023-04-07 PROCEDURE — 99214 OFFICE O/P EST MOD 30 MIN: CPT | Performed by: PSYCHIATRY & NEUROLOGY

## 2023-04-07 PROCEDURE — G8417 CALC BMI ABV UP PARAM F/U: HCPCS | Performed by: PSYCHIATRY & NEUROLOGY

## 2023-04-07 RX ORDER — AMITRIPTYLINE HYDROCHLORIDE 50 MG/1
50 TABLET, FILM COATED ORAL NIGHTLY
Qty: 90 TABLET | Refills: 3 | Status: SHIPPED | OUTPATIENT
Start: 2023-04-07

## 2023-04-07 RX ORDER — VARENICLINE TARTRATE 1 MG/1
TABLET, FILM COATED ORAL
COMMUNITY
Start: 2023-03-17

## 2023-04-07 ASSESSMENT — ENCOUNTER SYMPTOMS
BACK PAIN: 1
DIARRHEA: 1
COUGH: 1
SHORTNESS OF BREATH: 1

## 2023-04-07 NOTE — PROGRESS NOTES
. No atrophy  Sensation . Decreased pinprick and light touch distal lower extremities in stocking distributaion  Deep Tendon Reflexes hypoactive with absent ankle jerks   Plantar response flexor bilaterally    Assessment:       Diagnosis Orders   1. Migraine without aura and without status migrainosus, not intractable        2. Diabetic polyneuropathy associated with diabetes mellitus due to underlying condition ([facility]        3.  Chronic low back pain with sciatica, sciatica laterality unspecified, unspecified back pain laterality        He is to continue current regimen     Plan:      As above         Anup Moon MD

## 2023-04-20 ENCOUNTER — PATIENT MESSAGE (OUTPATIENT)
Dept: FAMILY MEDICINE CLINIC | Age: 58
End: 2023-04-20

## 2023-04-20 DIAGNOSIS — Z87.891 PERSONAL HISTORY OF SMOKING: Primary | ICD-10-CM

## 2023-04-21 RX ORDER — VARENICLINE TARTRATE 1 MG/1
1 TABLET, FILM COATED ORAL 2 TIMES DAILY
Qty: 60 TABLET | Refills: 3 | Status: SHIPPED | OUTPATIENT
Start: 2023-04-21

## 2023-04-21 NOTE — TELEPHONE ENCOUNTER
From: Galindo Herman.   To: Dr. Sean Lino: 4/20/2023 6:35 PM EDT  Subject: Meds     Can you please Refill my CHantix 1mg Tablet

## 2023-04-24 ENCOUNTER — PATIENT MESSAGE (OUTPATIENT)
Dept: FAMILY MEDICINE CLINIC | Age: 58
End: 2023-04-24

## 2023-04-24 DIAGNOSIS — M48.061 SPINAL STENOSIS OF LUMBAR REGION WITHOUT NEUROGENIC CLAUDICATION: ICD-10-CM

## 2023-04-24 DIAGNOSIS — M96.1 POSTLAMINECTOMY SYNDROME OF LUMBAR REGION: ICD-10-CM

## 2023-04-24 DIAGNOSIS — G89.29 CHRONIC MIDLINE LOW BACK PAIN WITH LEFT-SIDED SCIATICA: ICD-10-CM

## 2023-04-24 DIAGNOSIS — M54.42 CHRONIC MIDLINE LOW BACK PAIN WITH LEFT-SIDED SCIATICA: ICD-10-CM

## 2023-04-24 DIAGNOSIS — M51.36 DDD (DEGENERATIVE DISC DISEASE), LUMBAR: ICD-10-CM

## 2023-04-25 RX ORDER — OXYCODONE HYDROCHLORIDE 10 MG/1
10 TABLET ORAL EVERY 8 HOURS PRN
Qty: 90 TABLET | Refills: 0 | Status: SHIPPED | OUTPATIENT
Start: 2023-04-25 | End: 2023-05-25

## 2023-04-25 NOTE — TELEPHONE ENCOUNTER
From: Harpreet Brandt.   To: Dr. Knight Rummonicof: 4/24/2023 6:50 PM EDT  Subject: Meds     Can you please Refill my oxyCODONE 10 mg one every 8 hours thank you

## 2023-04-26 LAB
AVERAGE GLUCOSE: NORMAL
HBA1C MFR BLD: 8.8 %

## 2023-05-03 ENCOUNTER — HOSPITAL ENCOUNTER (OUTPATIENT)
Age: 58
Setting detail: SPECIMEN
Discharge: HOME | End: 2023-05-03
Payer: COMMERCIAL

## 2023-05-03 DIAGNOSIS — D64.9 NORMOCYTIC ANEMIA: ICD-10-CM

## 2023-05-03 LAB
ABSOLUTE EOS #: 0.2 K/UL (ref 0–0.4)
ABSOLUTE LYMPH #: 0.8 K/UL (ref 1–4.8)
ABSOLUTE MONO #: 0.5 K/UL (ref 0.1–1.3)
BASOPHILS # BLD: 1 % (ref 0–2)
BASOPHILS ABSOLUTE: 0 K/UL (ref 0–0.2)
EOSINOPHILS RELATIVE PERCENT: 3 % (ref 0–4)
FERRITIN SERPL-MCNC: 163 NG/ML (ref 30–400)
HCT VFR BLD AUTO: 36.1 % (ref 41–53)
HGB BLD-MCNC: 11.7 G/DL (ref 13.5–17.5)
IRON SATURATION: 14 % (ref 20–55)
IRON SERPL-MCNC: 48 UG/DL (ref 59–158)
LYMPHOCYTES # BLD: 9 % (ref 24–44)
MCH RBC QN AUTO: 27.1 PG (ref 26–34)
MCHC RBC AUTO-ENTMCNC: 32.4 G/DL (ref 31–37)
MCV RBC AUTO: 83.9 FL (ref 80–100)
MONOCYTES # BLD: 6 % (ref 1–7)
PDW BLD-RTO: 17.3 % (ref 11.5–14.9)
PLATELET # BLD AUTO: 189 K/UL (ref 150–450)
PMV BLD AUTO: 9.6 FL (ref 6–12)
RBC # BLD: 4.3 M/UL (ref 4.5–5.9)
SEG NEUTROPHILS: 81 % (ref 36–66)
SEGMENTED NEUTROPHILS ABSOLUTE COUNT: 7.3 K/UL (ref 1.3–9.1)
TIBC SERPL-MCNC: 349 UG/DL (ref 250–450)
UNSATURATED IRON BINDING CAPACITY: 301 UG/DL (ref 112–347)
WBC # BLD AUTO: 8.9 K/UL (ref 3.5–11)

## 2023-05-03 PROCEDURE — 85025 COMPLETE CBC W/AUTO DIFF WBC: CPT

## 2023-05-03 PROCEDURE — 83550 IRON BINDING TEST: CPT

## 2023-05-03 PROCEDURE — 83540 ASSAY OF IRON: CPT

## 2023-05-03 PROCEDURE — 82728 ASSAY OF FERRITIN: CPT

## 2023-05-03 PROCEDURE — 36415 COLL VENOUS BLD VENIPUNCTURE: CPT

## 2023-05-05 DIAGNOSIS — E78.5 HYPERLIPIDEMIA WITH TARGET LDL LESS THAN 70: ICD-10-CM

## 2023-05-08 ENCOUNTER — TELEPHONE (OUTPATIENT)
Dept: FAMILY MEDICINE CLINIC | Age: 58
End: 2023-05-08

## 2023-05-08 RX ORDER — ROSUVASTATIN CALCIUM 10 MG/1
TABLET, COATED ORAL
Qty: 90 TABLET | Refills: 3 | Status: SHIPPED | OUTPATIENT
Start: 2023-05-08

## 2023-05-08 RX ORDER — GABAPENTIN 300 MG/1
300 CAPSULE ORAL 3 TIMES DAILY
Qty: 270 CAPSULE | Refills: 3 | Status: SHIPPED | OUTPATIENT
Start: 2023-05-08 | End: 2023-08-06

## 2023-05-08 NOTE — TELEPHONE ENCOUNTER
We did sign his oxygen orders in February and I documented chronic respiratory failure in March at the office visit    I Do suggest him to first contact the company, did he mean he needs the oxygen tank or the whole machine?-The company has to send us an order for his oxygen      \"pts large oxygen machine that connects to his ventilator has stopped working and needs a new order placed. [facility] provides his Oxygen refills. \"

## 2023-05-08 NOTE — TELEPHONE ENCOUNTER
Pharmacy requesting refill of gabapentin (NEURONTIN) 300 MG capsule      Medication active on med list yes      Date of last Rx: 3/22/2023 with 0 refills          verified by GLORIA JONES      Date of last appointment 4/7/2023    Next Visit Date:  Visit date not found

## 2023-05-08 NOTE — TELEPHONE ENCOUNTER
Pts daughter called in stating pts large oxygen machine that connects to his ventilator has stopped working and needs a new order placed. [facility] provides his Oxygen refills. Pts daughter also states that pt has not received his urinal either that was ordered in March. Writer adv will refax to [facility].

## 2023-05-24 ENCOUNTER — PATIENT MESSAGE (OUTPATIENT)
Dept: FAMILY MEDICINE CLINIC | Age: 58
End: 2023-05-24

## 2023-05-24 ENCOUNTER — TELEPHONE (OUTPATIENT)
Dept: PHARMACY | Age: 58
End: 2023-05-24

## 2023-05-24 DIAGNOSIS — M96.1 POSTLAMINECTOMY SYNDROME OF LUMBAR REGION: ICD-10-CM

## 2023-05-24 DIAGNOSIS — G89.29 CHRONIC MIDLINE LOW BACK PAIN WITH LEFT-SIDED SCIATICA: ICD-10-CM

## 2023-05-24 DIAGNOSIS — M48.061 SPINAL STENOSIS OF LUMBAR REGION WITHOUT NEUROGENIC CLAUDICATION: ICD-10-CM

## 2023-05-24 DIAGNOSIS — M54.42 CHRONIC MIDLINE LOW BACK PAIN WITH LEFT-SIDED SCIATICA: ICD-10-CM

## 2023-05-24 DIAGNOSIS — M51.36 DDD (DEGENERATIVE DISC DISEASE), LUMBAR: ICD-10-CM

## 2023-05-24 RX ORDER — OXYCODONE HYDROCHLORIDE 10 MG/1
10 TABLET ORAL EVERY 8 HOURS PRN
Qty: 90 TABLET | Refills: 0 | Status: SHIPPED | OUTPATIENT
Start: 2023-05-24 | End: 2023-06-23

## 2023-05-24 NOTE — TELEPHONE ENCOUNTER
Please Approve or Refuse.   Send to Pharmacy per Pt's Request: sherri     Next Visit Date:  6/7/2023   Last Visit Date: 3/7/2023    Hemoglobin A1C (%)   Date Value   01/12/2023 9.5   09/26/2022 8.8   06/06/2022 7.8 (H)             ( goal A1C is < 7)   BP Readings from Last 3 Encounters:   03/22/23 (!) 169/52   03/07/23 138/87   02/15/23 (!) 122/51          (goal 120/80)  BUN   Date Value Ref Range Status   02/01/2023 44 (H) 6 - 20 mg/dL Final     Creatinine   Date Value Ref Range Status   02/01/2023 1.59 (H) 0.70 - 1.20 mg/dL Final     Potassium   Date Value Ref Range Status   02/01/2023 3.6 (L) 3.7 - 5.3 mmol/L Final

## 2023-05-24 NOTE — TELEPHONE ENCOUNTER
From: Darlin Rendon.   To: Dr. Heide Cartwright: 5/24/2023 8:43 AM EDT  Subject: Meds     Can you please Refill my OxyCODONE 10MG I will be out tomorrow night Thank you

## 2023-05-24 NOTE — TELEPHONE ENCOUNTER
Noted. Thank you!   Due to wide spread lesions and risk of sepsis to go to ED    Future Appointments   Date Time Provider [place]   6/1/2023  8:00 AM Sabra Kwong MD [facility]   6/7/2023  2:00 PM MD nani [facility]   3/8/2024 10:00 AM Kate Valladares MD [facility]

## 2023-05-24 NOTE — TELEPHONE ENCOUNTER
Patient's daughter called to indicate patient was not able to make appt today due to illness. Called and spoke directly to patient who indicates his recurring skin infection is very bad and is over entire body right now. States it is cracked open and he has bleeding, especially in his groin area. Has placed a call to his PCP and is waiting for call back with direction as to if he is to go to ED or if she is going to call in an antibiotic. Will check back with patient later to see what he is doing. Will cancel appt for diabetes medication management today and reschedule after this acute issue is under control. Yesenia Rizvi RPH,Pharm. D,, BCPS, CACP  5/24/2023  7:43 AM

## 2023-06-01 ENCOUNTER — TELEPHONE (OUTPATIENT)
Dept: ONCOLOGY | Age: 58
End: 2023-06-01

## 2023-06-01 NOTE — TELEPHONE ENCOUNTER
PATIENT'S DAUGHTER/ANIYA CALLED TO CANCEL PATIENT'S APPOINTMENT, AS HE FELL. ANIYA STATED PATIENT IS NOT GOING TO RESCHEDULE DUE TO [address]. WRITER LET ANIYA KNOW THAT IF PATIENT CHANGES HIS MIND, HE CAN CALL THE OFFICE TO GET RESCHEDULED. ANIYA VERBALIZED UNDERSTANDING.

## 2023-06-07 ENCOUNTER — OFFICE VISIT (OUTPATIENT)
Dept: FAMILY MEDICINE CLINIC | Age: 58
End: 2023-06-07
Payer: COMMERCIAL

## 2023-06-07 VITALS
WEIGHT: 315 LBS | DIASTOLIC BLOOD PRESSURE: 80 MMHG | TEMPERATURE: 98 F | BODY MASS INDEX: 41.75 KG/M2 | SYSTOLIC BLOOD PRESSURE: 110 MMHG | OXYGEN SATURATION: 93 % | HEART RATE: 103 BPM | HEIGHT: 73 IN

## 2023-06-07 DIAGNOSIS — N18.31 ANEMIA OF CHRONIC RENAL FAILURE, STAGE 3A (HCC): ICD-10-CM

## 2023-06-07 DIAGNOSIS — Z71.89 DNR (DO NOT RESUSCITATE) DISCUSSION: ICD-10-CM

## 2023-06-07 DIAGNOSIS — M54.42 CHRONIC MIDLINE LOW BACK PAIN WITH LEFT-SIDED SCIATICA: ICD-10-CM

## 2023-06-07 DIAGNOSIS — D63.1 ANEMIA OF CHRONIC RENAL FAILURE, STAGE 3A (HCC): ICD-10-CM

## 2023-06-07 DIAGNOSIS — I73.9 PVD (PERIPHERAL VASCULAR DISEASE) (HCC): ICD-10-CM

## 2023-06-07 DIAGNOSIS — J44.1 COPD WITH ACUTE EXACERBATION (HCC): Primary | ICD-10-CM

## 2023-06-07 DIAGNOSIS — N18.30 BENIGN HYPERTENSIVE HEART AND CKD, STAGE 3 (GFR 30-59), W CHF (HCC): ICD-10-CM

## 2023-06-07 DIAGNOSIS — I50.42 CHRONIC COMBINED SYSTOLIC AND DIASTOLIC CONGESTIVE HEART FAILURE (HCC): ICD-10-CM

## 2023-06-07 DIAGNOSIS — G89.29 CHRONIC MIDLINE LOW BACK PAIN WITH LEFT-SIDED SCIATICA: ICD-10-CM

## 2023-06-07 DIAGNOSIS — I13.0 BENIGN HYPERTENSIVE HEART AND CKD, STAGE 3 (GFR 30-59), W CHF (HCC): ICD-10-CM

## 2023-06-07 DIAGNOSIS — E11.42 TYPE 2 DIABETES MELLITUS WITH DIABETIC POLYNEUROPATHY, WITH LONG-TERM CURRENT USE OF INSULIN (HCC): ICD-10-CM

## 2023-06-07 DIAGNOSIS — Z79.4 TYPE 2 DIABETES MELLITUS WITH DIABETIC POLYNEUROPATHY, WITH LONG-TERM CURRENT USE OF INSULIN (HCC): ICD-10-CM

## 2023-06-07 DIAGNOSIS — G47.33 OSA (OBSTRUCTIVE SLEEP APNEA): ICD-10-CM

## 2023-06-07 DIAGNOSIS — J96.11 CHRONIC RESPIRATORY FAILURE WITH HYPOXIA (HCC): ICD-10-CM

## 2023-06-07 PROBLEM — H60.21 ACUTE MALIGNANT OTITIS EXTERNA OF RIGHT EAR: Status: RESOLVED | Noted: 2020-07-24 | Resolved: 2023-06-07

## 2023-06-07 PROCEDURE — 3023F SPIROM DOC REV: CPT | Performed by: FAMILY MEDICINE

## 2023-06-07 PROCEDURE — 99214 OFFICE O/P EST MOD 30 MIN: CPT | Performed by: FAMILY MEDICINE

## 2023-06-07 PROCEDURE — 2022F DILAT RTA XM EVC RTNOPTHY: CPT | Performed by: FAMILY MEDICINE

## 2023-06-07 PROCEDURE — 1036F TOBACCO NON-USER: CPT | Performed by: FAMILY MEDICINE

## 2023-06-07 PROCEDURE — 3052F HG A1C>EQUAL 8.0%<EQUAL 9.0%: CPT | Performed by: FAMILY MEDICINE

## 2023-06-07 PROCEDURE — G8427 DOCREV CUR MEDS BY ELIG CLIN: HCPCS | Performed by: FAMILY MEDICINE

## 2023-06-07 PROCEDURE — 3017F COLORECTAL CA SCREEN DOC REV: CPT | Performed by: FAMILY MEDICINE

## 2023-06-07 PROCEDURE — G8417 CALC BMI ABV UP PARAM F/U: HCPCS | Performed by: FAMILY MEDICINE

## 2023-06-07 RX ORDER — FLUTICASONE FUROATE, UMECLIDINIUM BROMIDE AND VILANTEROL TRIFENATATE 100; 62.5; 25 UG/1; UG/1; UG/1
1 POWDER RESPIRATORY (INHALATION) DAILY
Qty: 60 EACH | Refills: 2 | Status: SHIPPED | OUTPATIENT
Start: 2023-06-07

## 2023-06-07 RX ORDER — PREDNISONE 10 MG/1
TABLET ORAL
Qty: 54 TABLET | Refills: 0 | Status: SHIPPED | OUTPATIENT
Start: 2023-06-07

## 2023-06-07 RX ORDER — AZITHROMYCIN 250 MG/1
TABLET, FILM COATED ORAL
Qty: 6 TABLET | Refills: 0 | Status: SHIPPED | OUTPATIENT
Start: 2023-06-07 | End: 2023-06-12

## 2023-06-07 SDOH — ECONOMIC STABILITY: FOOD INSECURITY: WITHIN THE PAST 12 MONTHS, YOU WORRIED THAT YOUR FOOD WOULD RUN OUT BEFORE YOU GOT MONEY TO BUY MORE.: NEVER TRUE

## 2023-06-07 SDOH — ECONOMIC STABILITY: FOOD INSECURITY: WITHIN THE PAST 12 MONTHS, THE FOOD YOU BOUGHT JUST DIDN'T LAST AND YOU DIDN'T HAVE MONEY TO GET MORE.: NEVER TRUE

## 2023-06-07 SDOH — ECONOMIC STABILITY: INCOME INSECURITY: HOW HARD IS IT FOR YOU TO PAY FOR THE VERY BASICS LIKE FOOD, HOUSING, MEDICAL CARE, AND HEATING?: NOT HARD AT ALL

## 2023-06-07 ASSESSMENT — ENCOUNTER SYMPTOMS
ABDOMINAL DISTENTION: 0
COUGH: 1
ABDOMINAL PAIN: 1
VOMITING: 0
CHEST TIGHTNESS: 1
DIARRHEA: 0
NAUSEA: 0
SHORTNESS OF BREATH: 1
WHEEZING: 0
APNEA: 1
CONSTIPATION: 0
BACK PAIN: 1

## 2023-06-07 NOTE — PROGRESS NOTES
Moderna series) 06/18/2022    Diabetic retinal exam  04/18/2023    A1C test (Diabetic or Prediabetic)  07/26/2023    Lipids  09/26/2023    Diabetic foot exam  10/11/2023    Diabetic Alb to Cr ratio (uACR) test  02/01/2024    GFR test (Diabetes, CKD 3-4, OR last GFR 15-59)  02/01/2024    Depression Monitoring  03/07/2024    Annual Wellness Visit (AWV)  03/07/2024    Colorectal Cancer Screen  04/12/2024    DTaP/Tdap/Td vaccine (3 - Td or Tdap) 09/12/2028    Hepatitis B vaccine  Completed    Flu vaccine  Completed    Shingles vaccine  Completed    Pneumococcal 0-64 years Vaccine  Completed    Hepatitis C screen  Completed    HIV screen  Completed    Hepatitis A vaccine  Aged Out    Hib vaccine  Aged Out    Meningococcal (ACWY) vaccine  Aged Out    Prostate Specific Antigen (PSA) Screening or Monitoring  Discontinued
(EFFEXOR XR) 150 MG extended release capsule Take 1 capsule by mouth every morning Interact with amitriptyline, please monitor for serotonin syndrome symptoms Yes Paige Bryant MD   Insulin Syringe-Needle U-100 (BD INSULIN SYRINGE U/F) 31G X 5/16\" 1 ML MISC USE TO SUBCUTANEOUSLY INJECT INSULIN THREE TIMES A DAY Yes Aftab Burrell MD   vitamin B-12 (CYANOCOBALAMIN) 500 MCG tablet Take 1 tablet by mouth daily Yes Aftab Burrell MD   ketoconazole (NIZORAL) 2 % cream Apply twice a day for yeast infection in the skin folds, for 4 weeks Yes Aftab Burrell MD   clobetasol (TEMOVATE) 0.05 % ointment Apply topically 2 times daily for psoriasis Yes Aftab Burrell MD   nystatin (MYCOSTATIN) 039264 UNIT/GM powder Apply 2 times daily in the skin folds for long term. Yes Aftab Burrell MD   docusate sodium (COLACE) 100 MG capsule Take 1 capsule by mouth 2 times daily For constipation Yes Aftab Burrell MD   allopurinol (ZYLOPRIM) 300 MG tablet Take 1 tablet by mouth daily For gout, stop medication if any rash develops Yes Aftab Burrell MD   blood glucose monitor strips Test 3 times a day & as needed for symptoms of irregular blood glucose. Dispense sufficient amount for indicated testing frequency plus additional to accommodate PRN testing needs. One touch Ultra blue. To be used if Chew not working or to double check sugars.  Yes Aftab Burrell MD   Respiratory Therapy Supplies (NEBULIZER/TUBING/MOUTHPIECE) KIT Dx COPD needs nebulizer supplies Yes Aftab Burrell MD   naloxone 4 MG/0.1ML LIQD nasal spray 1 spray by Nasal route as needed for Opioid Reversal Patient needs counseling Yes Aftab Burrell MD   MUCUS RELIEF 600 MG extended release tablet  Yes Historical Provider, MD Kellie Velarde 0.02-0.005 % SOLN  Yes Historical Provider, MD   latanoprost (XALATAN) 0.005 % ophthalmic solution 1 drop nightly Yes Historical Provider, MD   clopidogrel (PLAVIX) 75 MG tablet Take 1

## 2023-06-22 ENCOUNTER — PATIENT MESSAGE (OUTPATIENT)
Dept: FAMILY MEDICINE CLINIC | Age: 58
End: 2023-06-22

## 2023-06-22 DIAGNOSIS — M96.1 POSTLAMINECTOMY SYNDROME OF LUMBAR REGION: ICD-10-CM

## 2023-06-22 DIAGNOSIS — G89.29 CHRONIC MIDLINE LOW BACK PAIN WITH LEFT-SIDED SCIATICA: ICD-10-CM

## 2023-06-22 DIAGNOSIS — M54.42 CHRONIC MIDLINE LOW BACK PAIN WITH LEFT-SIDED SCIATICA: ICD-10-CM

## 2023-06-22 DIAGNOSIS — M51.36 DDD (DEGENERATIVE DISC DISEASE), LUMBAR: ICD-10-CM

## 2023-06-22 DIAGNOSIS — M48.061 SPINAL STENOSIS OF LUMBAR REGION WITHOUT NEUROGENIC CLAUDICATION: ICD-10-CM

## 2023-06-22 RX ORDER — OXYCODONE HYDROCHLORIDE 10 MG/1
10 TABLET ORAL EVERY 8 HOURS PRN
Qty: 90 TABLET | Refills: 0 | Status: SHIPPED | OUTPATIENT
Start: 2023-06-22 | End: 2023-07-22

## 2023-06-22 NOTE — TELEPHONE ENCOUNTER
From: Chandler Cobb.   To: Dr. Victor M Brown: 6/22/2023 10:02 AM EDT  Subject: Meds     Can you please refill my oxyCODONE 10MG tablet one tablet every 8 hours I will be out of them Saturday thank you and have a good day

## 2023-06-27 ENCOUNTER — HOSPITAL ENCOUNTER (OUTPATIENT)
Age: 58
Discharge: HOME | End: 2023-06-27
Payer: COMMERCIAL

## 2023-06-27 DIAGNOSIS — I50.42 CHRONIC COMBINED SYSTOLIC AND DIASTOLIC CONGESTIVE HEART FAILURE (HCC): ICD-10-CM

## 2023-06-27 DIAGNOSIS — N18.30 BENIGN HYPERTENSIVE HEART AND CKD, STAGE 3 (GFR 30-59), W CHF (HCC): ICD-10-CM

## 2023-06-27 DIAGNOSIS — I13.0 BENIGN HYPERTENSIVE HEART AND CKD, STAGE 3 (GFR 30-59), W CHF (HCC): ICD-10-CM

## 2023-06-27 DIAGNOSIS — J44.1 COPD WITH ACUTE EXACERBATION (HCC): ICD-10-CM

## 2023-06-27 DIAGNOSIS — M1A.30X0 CHRONIC GOUT DUE TO RENAL IMPAIRMENT WITHOUT TOPHUS, UNSPECIFIED SITE: ICD-10-CM

## 2023-06-27 DIAGNOSIS — E11.42 TYPE 2 DIABETES MELLITUS WITH DIABETIC POLYNEUROPATHY, WITH LONG-TERM CURRENT USE OF INSULIN (HCC): ICD-10-CM

## 2023-06-27 DIAGNOSIS — Z79.4 TYPE 2 DIABETES MELLITUS WITH DIABETIC POLYNEUROPATHY, WITH LONG-TERM CURRENT USE OF INSULIN (HCC): ICD-10-CM

## 2023-06-27 LAB
ALBUMIN SERPL-MCNC: 3.8 G/DL (ref 3.5–5.2)
ALP SERPL-CCNC: 78 U/L (ref 40–129)
ALT SERPL-CCNC: 17 U/L (ref 5–41)
ANION GAP SERPL CALCULATED.3IONS-SCNC: 13 MMOL/L (ref 9–17)
AST SERPL-CCNC: 21 U/L
BASOPHILS # BLD: 0 K/UL (ref 0–0.2)
BASOPHILS NFR BLD: 1 % (ref 0–2)
BILIRUB SERPL-MCNC: 0.4 MG/DL (ref 0.3–1.2)
BNP SERPL-MCNC: 225 PG/ML
BUN SERPL-MCNC: 45 MG/DL (ref 6–20)
CALCIUM SERPL-MCNC: 9.4 MG/DL (ref 8.6–10.4)
CHLORIDE SERPL-SCNC: 93 MMOL/L (ref 98–107)
CO2 SERPL-SCNC: 31 MMOL/L (ref 20–31)
CREAT SERPL-MCNC: 1.78 MG/DL (ref 0.7–1.2)
EOSINOPHIL # BLD: 0.2 K/UL (ref 0–0.4)
EOSINOPHILS RELATIVE PERCENT: 3 % (ref 0–4)
ERYTHROCYTE [DISTWIDTH] IN BLOOD BY AUTOMATED COUNT: 17.2 % (ref 11.5–14.9)
GFR SERPL CREATININE-BSD FRML MDRD: 44 ML/MIN/1.73M2
GLUCOSE SERPL-MCNC: 263 MG/DL (ref 70–99)
HCT VFR BLD AUTO: 32.1 % (ref 41–53)
HGB BLD-MCNC: 10.4 G/DL (ref 13.5–17.5)
LYMPHOCYTES # BLD: 11 % (ref 24–44)
LYMPHOCYTES NFR BLD: 0.7 K/UL (ref 1–4.8)
MAGNESIUM SERPL-MCNC: 1.9 MG/DL (ref 1.6–2.6)
MCH RBC QN AUTO: 27.3 PG (ref 26–34)
MCHC RBC AUTO-ENTMCNC: 32.5 G/DL (ref 31–37)
MCV RBC AUTO: 84.1 FL (ref 80–100)
MONOCYTES NFR BLD: 0.4 K/UL (ref 0.1–1.3)
MONOCYTES NFR BLD: 6 % (ref 1–7)
NEUTROPHILS NFR BLD: 79 % (ref 36–66)
NEUTS SEG NFR BLD: 5.1 K/UL (ref 1.3–9.1)
PHOSPHATE SERPL-MCNC: 2.4 MG/DL (ref 2.5–4.5)
PLATELET # BLD AUTO: 152 K/UL (ref 150–450)
PMV BLD AUTO: 9.1 FL (ref 6–12)
POTASSIUM SERPL-SCNC: 2.9 MMOL/L (ref 3.7–5.3)
PROT SERPL-MCNC: 6.7 G/DL (ref 6.4–8.3)
RBC # BLD AUTO: 3.81 M/UL (ref 4.5–5.9)
SODIUM SERPL-SCNC: 137 MMOL/L (ref 135–144)
TSH SERPL DL<=0.05 MIU/L-ACNC: 1.16 UIU/ML (ref 0.3–5)
URATE SERPL-MCNC: 9.5 MG/DL (ref 3.4–7)
WBC OTHER # BLD: 6.4 K/UL (ref 3.5–11)

## 2023-06-27 PROCEDURE — 84100 ASSAY OF PHOSPHORUS: CPT

## 2023-06-27 PROCEDURE — 83735 ASSAY OF MAGNESIUM: CPT

## 2023-06-27 PROCEDURE — 80053 COMPREHEN METABOLIC PANEL: CPT

## 2023-06-27 PROCEDURE — 84443 ASSAY THYROID STIM HORMONE: CPT

## 2023-06-27 PROCEDURE — 83036 HEMOGLOBIN GLYCOSYLATED A1C: CPT

## 2023-06-27 PROCEDURE — 85027 COMPLETE CBC AUTOMATED: CPT

## 2023-06-27 PROCEDURE — 84550 ASSAY OF BLOOD/URIC ACID: CPT

## 2023-06-27 PROCEDURE — 83880 ASSAY OF NATRIURETIC PEPTIDE: CPT

## 2023-06-27 PROCEDURE — 36415 COLL VENOUS BLD VENIPUNCTURE: CPT

## 2023-06-27 RX ORDER — ALLOPURINOL 300 MG/1
300 TABLET ORAL DAILY
Qty: 90 TABLET | Refills: 3 | Status: SHIPPED | OUTPATIENT
Start: 2023-06-27

## 2023-06-28 LAB
EST. AVERAGE GLUCOSE BLD GHB EST-MCNC: 203 MG/DL
HBA1C MFR BLD: 8.7 % (ref 4–6)

## 2023-06-30 DIAGNOSIS — G89.29 CHRONIC BACK PAIN GREATER THAN 3 MONTHS DURATION: ICD-10-CM

## 2023-06-30 DIAGNOSIS — M54.9 CHRONIC BACK PAIN GREATER THAN 3 MONTHS DURATION: ICD-10-CM

## 2023-06-30 DIAGNOSIS — M48.061 SPINAL STENOSIS OF LUMBAR REGION WITHOUT NEUROGENIC CLAUDICATION: ICD-10-CM

## 2023-07-02 DIAGNOSIS — L73.2 HIDRADENITIS SUPPURATIVA: ICD-10-CM

## 2023-07-02 DIAGNOSIS — K21.9 GASTROESOPHAGEAL REFLUX DISEASE WITHOUT ESOPHAGITIS: ICD-10-CM

## 2023-07-02 DIAGNOSIS — M54.9 CHRONIC BACK PAIN GREATER THAN 3 MONTHS DURATION: ICD-10-CM

## 2023-07-02 DIAGNOSIS — M48.061 SPINAL STENOSIS OF LUMBAR REGION WITHOUT NEUROGENIC CLAUDICATION: ICD-10-CM

## 2023-07-02 DIAGNOSIS — G89.29 CHRONIC BACK PAIN GREATER THAN 3 MONTHS DURATION: ICD-10-CM

## 2023-07-02 NOTE — TELEPHONE ENCOUNTER
Please Approve or Refuse.   Send to Pharmacy per Pt's Request:      Next Visit Date:  7/2/2023   Last Visit Date: 6/7/2023    Hemoglobin A1C (%)   Date Value   06/27/2023 8.7 (H)   04/26/2023 8.8   01/12/2023 9.5             ( goal A1C is < 7)   BP Readings from Last 3 Encounters:   06/07/23 110/80   03/22/23 (!) 169/52   03/07/23 138/87          (goal 120/80)  BUN   Date Value Ref Range Status   06/27/2023 45 (H) 6 - 20 mg/dL Final     Creatinine   Date Value Ref Range Status   06/27/2023 1.78 (H) 0.70 - 1.20 mg/dL Final     Potassium   Date Value Ref Range Status   06/27/2023 2.9 (LL) 3.7 - 5.3 mmol/L Final

## 2023-07-03 RX ORDER — TIZANIDINE 4 MG/1
TABLET ORAL
Qty: 90 TABLET | Refills: 3 | Status: SHIPPED | OUTPATIENT
Start: 2023-07-03

## 2023-07-03 RX ORDER — PANTOPRAZOLE SODIUM 40 MG/1
TABLET, DELAYED RELEASE ORAL
Qty: 90 TABLET | Refills: 1 | Status: SHIPPED | OUTPATIENT
Start: 2023-07-03

## 2023-07-03 RX ORDER — DOXYCYCLINE HYCLATE 100 MG
TABLET ORAL
Qty: 20 TABLET | Refills: 0 | Status: SHIPPED | OUTPATIENT
Start: 2023-07-03

## 2023-07-03 RX ORDER — TIZANIDINE 4 MG/1
TABLET ORAL
Qty: 90 TABLET | Refills: 3 | OUTPATIENT
Start: 2023-07-03

## 2023-07-03 NOTE — TELEPHONE ENCOUNTER
Please Approve or Refuse.   Send to Pharmacy per Pt's Request:      Next Visit Date:  Visit date not found   Last Visit Date: 6/7/2023    Hemoglobin A1C (%)   Date Value   06/27/2023 8.7 (H)   04/26/2023 8.8   01/12/2023 9.5             ( goal A1C is < 7)   BP Readings from Last 3 Encounters:   06/07/23 110/80   03/22/23 (!) 169/52   03/07/23 138/87          (goal 120/80)  BUN   Date Value Ref Range Status   06/27/2023 45 (H) 6 - 20 mg/dL Final     Creatinine   Date Value Ref Range Status   06/27/2023 1.78 (H) 0.70 - 1.20 mg/dL Final     Potassium   Date Value Ref Range Status   06/27/2023 2.9 (LL) 3.7 - 5.3 mmol/L Final

## 2023-07-06 DIAGNOSIS — E87.6 LOW BLOOD POTASSIUM: Primary | ICD-10-CM

## 2023-07-07 DIAGNOSIS — J44.1 COPD WITH ACUTE EXACERBATION (HCC): ICD-10-CM

## 2023-07-07 RX ORDER — FLUTICASONE FUROATE, UMECLIDINIUM BROMIDE AND VILANTEROL TRIFENATATE 100; 62.5; 25 UG/1; UG/1; UG/1
POWDER RESPIRATORY (INHALATION)
Qty: 1 EACH | Refills: 1 | Status: SHIPPED | OUTPATIENT
Start: 2023-07-07

## 2023-07-07 NOTE — TELEPHONE ENCOUNTER
Please Approve or Refuse.   Send to Pharmacy per Pt's Request: sherri     Next Visit Date:  Visit date not found   Last Visit Date: 6/7/2023    Hemoglobin A1C (%)   Date Value   06/27/2023 8.7 (H)   04/26/2023 8.8   01/12/2023 9.5             ( goal A1C is < 7)   BP Readings from Last 3 Encounters:   06/07/23 110/80   03/22/23 (!) 169/52   03/07/23 138/87          (goal 120/80)  BUN   Date Value Ref Range Status   06/27/2023 45 (H) 6 - 20 mg/dL Final     Creatinine   Date Value Ref Range Status   06/27/2023 1.78 (H) 0.70 - 1.20 mg/dL Final     Potassium   Date Value Ref Range Status   06/27/2023 2.9 (LL) 3.7 - 5.3 mmol/L Final
Yes

## 2023-07-18 DIAGNOSIS — J44.1 COPD WITH ACUTE EXACERBATION (HCC): ICD-10-CM

## 2023-07-18 RX ORDER — FLUTICASONE FUROATE, UMECLIDINIUM BROMIDE AND VILANTEROL TRIFENATATE 100; 62.5; 25 UG/1; UG/1; UG/1
POWDER RESPIRATORY (INHALATION)
Qty: 90 EACH | Refills: 3 | Status: SHIPPED | OUTPATIENT
Start: 2023-07-18

## 2023-07-24 ENCOUNTER — PATIENT MESSAGE (OUTPATIENT)
Dept: FAMILY MEDICINE CLINIC | Age: 58
End: 2023-07-24

## 2023-07-24 DIAGNOSIS — G89.29 CHRONIC MIDLINE LOW BACK PAIN WITH LEFT-SIDED SCIATICA: ICD-10-CM

## 2023-07-24 DIAGNOSIS — M54.42 CHRONIC MIDLINE LOW BACK PAIN WITH LEFT-SIDED SCIATICA: ICD-10-CM

## 2023-07-24 DIAGNOSIS — M48.061 SPINAL STENOSIS OF LUMBAR REGION WITHOUT NEUROGENIC CLAUDICATION: ICD-10-CM

## 2023-07-24 DIAGNOSIS — M51.36 DDD (DEGENERATIVE DISC DISEASE), LUMBAR: ICD-10-CM

## 2023-07-24 DIAGNOSIS — M96.1 POSTLAMINECTOMY SYNDROME OF LUMBAR REGION: ICD-10-CM

## 2023-07-24 RX ORDER — OXYCODONE HYDROCHLORIDE 10 MG/1
10 TABLET ORAL EVERY 8 HOURS PRN
Qty: 90 TABLET | Refills: 0 | Status: SHIPPED | OUTPATIENT
Start: 2023-07-24 | End: 2023-08-23

## 2023-07-24 NOTE — TELEPHONE ENCOUNTER
Please Approve or Refuse.   Send to Pharmacy per Pt's Request: sherri      Next Visit Date:  Visit date not found   Last Visit Date: 6/7/2023    Hemoglobin A1C (%)   Date Value   06/27/2023 8.7 (H)   04/26/2023 8.8   01/12/2023 9.5             ( goal A1C is < 7)   BP Readings from Last 3 Encounters:   06/07/23 110/80   03/22/23 (!) 169/52   03/07/23 138/87          (goal 120/80)  BUN   Date Value Ref Range Status   06/27/2023 45 (H) 6 - 20 mg/dL Final     Creatinine   Date Value Ref Range Status   06/27/2023 1.78 (H) 0.70 - 1.20 mg/dL Final     Potassium   Date Value Ref Range Status   06/27/2023 2.9 (LL) 3.7 - 5.3 mmol/L Final

## 2023-07-24 NOTE — TELEPHONE ENCOUNTER
From: Feliz Cohn. To: Dr. Savana Mckay: 7/24/2023 12:53 PM EDT  Subject: Meds     Can you please refill my prescription for my pain meds oxyCODONE 10 mg one every.  8 hours for pain I will be out of them tomorrow night thank you have a good day

## 2023-08-04 DIAGNOSIS — F41.9 ANXIETY: ICD-10-CM

## 2023-08-04 DIAGNOSIS — F33.2 MDD (MAJOR DEPRESSIVE DISORDER), RECURRENT SEVERE, WITHOUT PSYCHOSIS (HCC): ICD-10-CM

## 2023-08-07 RX ORDER — VENLAFAXINE HYDROCHLORIDE 150 MG/1
150 CAPSULE, EXTENDED RELEASE ORAL EVERY MORNING
Qty: 90 CAPSULE | Refills: 0 | Status: SHIPPED | OUTPATIENT
Start: 2023-08-07

## 2023-08-11 DIAGNOSIS — J44.1 COPD WITH ACUTE EXACERBATION (HCC): ICD-10-CM

## 2023-08-11 DIAGNOSIS — Z87.891 PERSONAL HISTORY OF SMOKING: ICD-10-CM

## 2023-08-11 RX ORDER — VARENICLINE TARTRATE 1 MG/1
TABLET, FILM COATED ORAL
Qty: 56 TABLET | Refills: 1 | Status: SHIPPED | OUTPATIENT
Start: 2023-08-11

## 2023-08-11 NOTE — TELEPHONE ENCOUNTER
Please Approve or Refuse.   Send to Pharmacy per Pt's Request:      Next Visit Date:  10/10/2023   Last Visit Date: 6/7/2023    Hemoglobin A1C (%)   Date Value   06/27/2023 8.7 (H)   04/26/2023 8.8   01/12/2023 9.5             ( goal A1C is < 7)   BP Readings from Last 3 Encounters:   06/07/23 110/80   03/22/23 (!) 169/52   03/07/23 138/87          (goal 120/80)  BUN   Date Value Ref Range Status   06/27/2023 45 (H) 6 - 20 mg/dL Final     Creatinine   Date Value Ref Range Status   06/27/2023 1.78 (H) 0.70 - 1.20 mg/dL Final     Potassium   Date Value Ref Range Status   06/27/2023 2.9 (LL) 3.7 - 5.3 mmol/L Final

## 2023-08-14 RX ORDER — FLUTICASONE FUROATE, UMECLIDINIUM BROMIDE AND VILANTEROL TRIFENATATE 100; 62.5; 25 UG/1; UG/1; UG/1
POWDER RESPIRATORY (INHALATION)
Qty: 90 EACH | Refills: 3 | Status: SHIPPED | OUTPATIENT
Start: 2023-08-14

## 2023-08-21 ENCOUNTER — HOSPITAL ENCOUNTER (OUTPATIENT)
Age: 58
Discharge: HOME | End: 2023-08-21
Payer: COMMERCIAL

## 2023-08-21 DIAGNOSIS — D63.1 ANEMIA IN STAGE 3A CHRONIC KIDNEY DISEASE (HCC): ICD-10-CM

## 2023-08-21 DIAGNOSIS — E13.22 SECONDARY DIABETES MELLITUS WITH STAGE 3 CHRONIC KIDNEY DISEASE (HCC): ICD-10-CM

## 2023-08-21 DIAGNOSIS — N18.30 BENIGN HYPERTENSION WITH CKD (CHRONIC KIDNEY DISEASE) STAGE III (HCC): ICD-10-CM

## 2023-08-21 DIAGNOSIS — N18.31 ANEMIA IN STAGE 3A CHRONIC KIDNEY DISEASE (HCC): ICD-10-CM

## 2023-08-21 DIAGNOSIS — E11.42 TYPE 2 DIABETES MELLITUS WITH DIABETIC POLYNEUROPATHY, WITH LONG-TERM CURRENT USE OF INSULIN (HCC): ICD-10-CM

## 2023-08-21 DIAGNOSIS — I12.9 BENIGN HYPERTENSION WITH CKD (CHRONIC KIDNEY DISEASE) STAGE III (HCC): ICD-10-CM

## 2023-08-21 DIAGNOSIS — E78.5 HYPERLIPIDEMIA WITH TARGET LDL LESS THAN 70: ICD-10-CM

## 2023-08-21 DIAGNOSIS — Z79.4 TYPE 2 DIABETES MELLITUS WITH DIABETIC POLYNEUROPATHY, WITH LONG-TERM CURRENT USE OF INSULIN (HCC): ICD-10-CM

## 2023-08-21 DIAGNOSIS — N18.31 STAGE 3A CHRONIC KIDNEY DISEASE (HCC): ICD-10-CM

## 2023-08-21 DIAGNOSIS — N18.30 SECONDARY DIABETES MELLITUS WITH STAGE 3 CHRONIC KIDNEY DISEASE (HCC): ICD-10-CM

## 2023-08-21 LAB
ANION GAP SERPL CALCULATED.3IONS-SCNC: 15 MMOL/L (ref 9–17)
BASOPHILS # BLD: 0 K/UL (ref 0–0.2)
BASOPHILS NFR BLD: 1 % (ref 0–2)
BUN SERPL-MCNC: 44 MG/DL (ref 6–20)
CALCIUM SERPL-MCNC: 9.3 MG/DL (ref 8.6–10.4)
CHLORIDE SERPL-SCNC: 93 MMOL/L (ref 98–107)
CHOLEST SERPL-MCNC: 134 MG/DL
CHOLESTEROL/HDL RATIO: 4.3
CO2 SERPL-SCNC: 30 MMOL/L (ref 20–31)
CREAT SERPL-MCNC: 1.8 MG/DL (ref 0.7–1.2)
EOSINOPHIL # BLD: 0.2 K/UL (ref 0–0.4)
EOSINOPHILS RELATIVE PERCENT: 3 % (ref 0–4)
ERYTHROCYTE [DISTWIDTH] IN BLOOD BY AUTOMATED COUNT: 17.4 % (ref 11.5–14.9)
FOLATE SERPL-MCNC: 15 NG/ML
GFR SERPL CREATININE-BSD FRML MDRD: 43 ML/MIN/1.73M2
GLUCOSE SERPL-MCNC: 346 MG/DL (ref 70–99)
HCT VFR BLD AUTO: 33.2 % (ref 41–53)
HDLC SERPL-MCNC: 31 MG/DL
HGB BLD-MCNC: 10.7 G/DL (ref 13.5–17.5)
LDLC SERPL CALC-MCNC: 38 MG/DL (ref 0–130)
LYMPHOCYTES NFR BLD: 0.7 K/UL (ref 1–4.8)
LYMPHOCYTES RELATIVE PERCENT: 10 % (ref 24–44)
MCH RBC QN AUTO: 27.4 PG (ref 26–34)
MCHC RBC AUTO-ENTMCNC: 32.2 G/DL (ref 31–37)
MCV RBC AUTO: 85.1 FL (ref 80–100)
MONOCYTES NFR BLD: 0.6 K/UL (ref 0.1–1.3)
MONOCYTES NFR BLD: 8 % (ref 1–7)
NEUTROPHILS NFR BLD: 78 % (ref 36–66)
NEUTS SEG NFR BLD: 5.6 K/UL (ref 1.3–9.1)
PHOSPHATE SERPL-MCNC: 4.1 MG/DL (ref 2.5–4.5)
PLATELET # BLD AUTO: 164 K/UL (ref 150–450)
PMV BLD AUTO: 8.9 FL (ref 6–12)
POTASSIUM SERPL-SCNC: 3.3 MMOL/L (ref 3.7–5.3)
RBC # BLD AUTO: 3.9 M/UL (ref 4.5–5.9)
SODIUM SERPL-SCNC: 138 MMOL/L (ref 135–144)
TRIGL SERPL-MCNC: 325 MG/DL
VIT B12 SERPL-MCNC: 951 PG/ML (ref 232–1245)
WBC OTHER # BLD: 7.2 K/UL (ref 3.5–11)

## 2023-08-21 PROCEDURE — 84100 ASSAY OF PHOSPHORUS: CPT

## 2023-08-21 PROCEDURE — 82746 ASSAY OF FOLIC ACID SERUM: CPT

## 2023-08-21 PROCEDURE — 85025 COMPLETE CBC W/AUTO DIFF WBC: CPT

## 2023-08-21 PROCEDURE — 82607 VITAMIN B-12: CPT

## 2023-08-21 PROCEDURE — 81015 MICROSCOPIC EXAM OF URINE: CPT

## 2023-08-21 PROCEDURE — 36415 COLL VENOUS BLD VENIPUNCTURE: CPT

## 2023-08-21 PROCEDURE — 80048 BASIC METABOLIC PNL TOTAL CA: CPT

## 2023-08-21 PROCEDURE — 80061 LIPID PANEL: CPT

## 2023-08-21 PROCEDURE — 81003 URINALYSIS AUTO W/O SCOPE: CPT

## 2023-08-21 NOTE — RESULT ENCOUNTER NOTE
Please notify patient: Small amount of blood in the urine  Triglycerides high, cut down pop  Vitamin N26 and folic acid normal    labs done by nephrologist show stable chronic kidney disease stage IIIb    Blood glucose very high at 346  Potassium low 3.3, is he taking the potassium pill?--- I do have on the list potassium from over-the-counter 99 mg tablet 4 tablets 2 times a day-----please keep taking those?   Anemia stable from prior    Future Appointments  8/29/2023  3:15 PM    Librado Hutton MD   [facility]        [facility]  10/10/2023 1:30 PM    Keesha Koch MD     [facility]  3/8/2024   10:00 AM   Keesha Koch MD     [facility]

## 2023-08-22 LAB
BACTERIA URNS QL MICRO: ABNORMAL
BILIRUB UR QL STRIP: NEGATIVE
CASTS #/AREA URNS LPF: ABNORMAL /LPF
CASTS #/AREA URNS LPF: ABNORMAL /LPF
CLARITY UR: CLEAR
COLOR UR: YELLOW
COMMENT: NORMAL
EPI CELLS #/AREA URNS HPF: ABNORMAL /HPF
GLUCOSE UR STRIP-MCNC: NEGATIVE MG/DL
HGB UR QL STRIP.AUTO: NEGATIVE
KETONES UR STRIP-MCNC: NEGATIVE MG/DL
LEUKOCYTE ESTERASE UR QL STRIP: NEGATIVE
NITRITE UR QL STRIP: NEGATIVE
PH UR STRIP: 6.5 [PH] (ref 5–8)
PROT UR STRIP-MCNC: NEGATIVE MG/DL
RBC #/AREA URNS HPF: ABNORMAL /HPF
SP GR UR STRIP: 1.02 (ref 1–1.03)
UROBILINOGEN UR STRIP-ACNC: NORMAL EU/DL (ref 0–1)
WBC #/AREA URNS HPF: ABNORMAL /HPF

## 2023-08-23 DIAGNOSIS — G89.29 CHRONIC MIDLINE LOW BACK PAIN WITH LEFT-SIDED SCIATICA: ICD-10-CM

## 2023-08-23 DIAGNOSIS — M48.061 SPINAL STENOSIS OF LUMBAR REGION WITHOUT NEUROGENIC CLAUDICATION: ICD-10-CM

## 2023-08-23 DIAGNOSIS — M51.36 DDD (DEGENERATIVE DISC DISEASE), LUMBAR: ICD-10-CM

## 2023-08-23 DIAGNOSIS — M96.1 POSTLAMINECTOMY SYNDROME OF LUMBAR REGION: ICD-10-CM

## 2023-08-23 DIAGNOSIS — M54.42 CHRONIC MIDLINE LOW BACK PAIN WITH LEFT-SIDED SCIATICA: ICD-10-CM

## 2023-08-23 RX ORDER — OXYCODONE HYDROCHLORIDE 10 MG/1
10 TABLET ORAL EVERY 8 HOURS PRN
Qty: 90 TABLET | Refills: 0 | Status: SHIPPED | OUTPATIENT
Start: 2023-08-23 | End: 2023-09-22

## 2023-08-23 NOTE — TELEPHONE ENCOUNTER
Please Approve or Refuse.   Send to Pharmacy per Pt's Request:      Next Visit Date:  10/10/2023   Last Visit Date: 6/7/2023    Hemoglobin A1C (%)   Date Value   06/27/2023 8.7 (H)   04/26/2023 8.8   01/12/2023 9.5             ( goal A1C is < 7)   BP Readings from Last 3 Encounters:   06/07/23 110/80   03/22/23 (!) 169/52   03/07/23 138/87          (goal 120/80)  BUN   Date Value Ref Range Status   08/21/2023 44 (H) 6 - 20 mg/dL Final     Creatinine   Date Value Ref Range Status   08/21/2023 1.8 (H) 0.7 - 1.2 mg/dL Final     Potassium   Date Value Ref Range Status   08/21/2023 3.3 (L) 3.7 - 5.3 mmol/L Final

## 2023-09-01 DIAGNOSIS — E55.9 VITAMIN D DEFICIENCY: ICD-10-CM

## 2023-09-05 ENCOUNTER — TELEPHONE (OUTPATIENT)
Dept: FAMILY MEDICINE CLINIC | Age: 58
End: 2023-09-05

## 2023-09-05 DIAGNOSIS — J96.11 CHRONIC RESPIRATORY FAILURE WITH HYPOXIA (HCC): Primary | ICD-10-CM

## 2023-09-05 RX ORDER — MELATONIN
Qty: 90 TABLET | Refills: 1 | Status: SHIPPED | OUTPATIENT
Start: 2023-09-05

## 2023-09-05 NOTE — TELEPHONE ENCOUNTER
I really need help from his daughter, we do not give the oxygen per mask    Is she referring to \"on noninvasive ventilator instead of CPAP\"    Does he need nasal mask for noninvasive ventilator? We really need this information from her. Regarding equipment pharmacy we can fax it to one of them, but depends on his insurance, where is he usually getting those from  We do need help in order to help him, we do not know what insurance covers    We need answer for both questions    If worsening shortness of breath can go to the emergency room, I discussed with him and I also sent him a [email or web address] message he is DNR CC arrest, so everything is until he stops breathing or the heart stops,  He is not DNR CC!

## 2023-09-05 NOTE — TELEPHONE ENCOUNTER
Received call from patient is requesting medical supplies for A MASK FOR OXYGEN. THEY DO NOT WANT THE nasal cannula THEY PREFER THE NASAL MASK TUBE INSTEAD. AS IT IS VERY HARD FOR PT TO EVEN GET OXYGEN THROUGH THE NASAL CANNULA. ALSO THEY DID ATTEMPT TO GET [facility], THEY WERE UNABLE TO GIVE NEW ORDER. SINCE THEY SAID ORDERS WERE ORIGINALLY ALREADY PLACED AND SENT TO MEDICAL SERVICE COMPANY AND IS ON BACK ORDER. PT DAUGHTER STATED HE NEEDED THIS ORDER ASAP. . To be faxed to medical supply location [facility] THAT HAS THE ORDER. SHE WAS UNSURE WHERE AND WAS LEAVING IT UP TO US TO FIND A PLACE. Diamond Hamman Also did make patient aware once faxed and received medical supply facility will contact them. When supplies are ready to be picked up.

## 2023-09-05 NOTE — TELEPHONE ENCOUNTER
Noted. Thank you!   I agree with sending the order and the progress note to [facility] [address], I suppose daughter was informed to contact this company    We cannot control these companies unfortunately    Future Appointments   Date Time Provider [address]   10/10/2023  1:30 PM MD nani Van   3/8/2024 10:00 AM MD nani Van

## 2023-09-05 NOTE — TELEPHONE ENCOUNTER
Diagnosis Orders   1.  Chronic respiratory failure with hypoxia ([address])  UNABLE TO FIND           Future Appointments   Date Time Provider [address]  [facility]   10/10/2023  1:30 PM Hallie Benz MD [facility]   3/8/2024 10:00 AM [facility]

## 2023-09-05 NOTE — TELEPHONE ENCOUNTER
Pts daughter Emmanuelle Carrera called in stating she has been calling around all day trying to find out where she could get a full oxygen mask because the patient currently uses the nasal lines but he can no longer take in oxygen through his nose. Daughter states that she called the pts pulmonologist and was told to contact PCP office because they don't know where to send it to, pts oxygen comes from [employer] and they do not currently have any of the masks. Emmanuelle Carrera was also told that [facility] most likely would not supply one because they do not provide his oxygen. Emmanuelle Carrera stated that she also called pts insurance to find out what places are in network and they would not help her either. Writer offered to attempt to send to [facility] but am not 364% certain if they carry the mask she is requesting. Order has been faxed to [facility] along with demo sheet and last OV notes.

## 2023-09-11 RX ORDER — BLOOD SUGAR DIAGNOSTIC
STRIP MISCELLANEOUS
Qty: 250 STRIP | Refills: 3 | Status: SHIPPED | OUTPATIENT
Start: 2023-09-11

## 2023-09-11 NOTE — TELEPHONE ENCOUNTER
Please Approve or Refuse.   Send to Pharmacy per Pt's Request:      Next Visit Date:  10/10/2023   Last Visit Date: 6/7/2023    Hemoglobin A1C (%)   Date Value   06/27/2023 8.7 (H)   04/26/2023 8.8   01/12/2023 9.5             ( goal A1C is < 7)   BP Readings from Last 3 Encounters:   08/29/23 132/70   06/07/23 110/80   03/22/23 (!) 169/52          (goal 120/80)  BUN   Date Value Ref Range Status   08/21/2023 44 (H) 6 - 20 mg/dL Final     Creatinine   Date Value Ref Range Status   08/21/2023 1.8 (H) 0.7 - 1.2 mg/dL Final     Potassium   Date Value Ref Range Status   08/21/2023 3.3 (L) 3.7 - 5.3 mmol/L Final

## 2023-09-21 DIAGNOSIS — M51.36 DDD (DEGENERATIVE DISC DISEASE), LUMBAR: ICD-10-CM

## 2023-09-21 DIAGNOSIS — I25.118 CORONARY ARTERY DISEASE OF NATIVE ARTERY OF NATIVE HEART WITH STABLE ANGINA PECTORIS (HCC): ICD-10-CM

## 2023-09-21 DIAGNOSIS — M54.42 CHRONIC MIDLINE LOW BACK PAIN WITH LEFT-SIDED SCIATICA: ICD-10-CM

## 2023-09-21 DIAGNOSIS — M96.1 POSTLAMINECTOMY SYNDROME OF LUMBAR REGION: ICD-10-CM

## 2023-09-21 DIAGNOSIS — M48.061 SPINAL STENOSIS OF LUMBAR REGION WITHOUT NEUROGENIC CLAUDICATION: ICD-10-CM

## 2023-09-21 DIAGNOSIS — G89.29 CHRONIC MIDLINE LOW BACK PAIN WITH LEFT-SIDED SCIATICA: ICD-10-CM

## 2023-09-22 DIAGNOSIS — M48.061 SPINAL STENOSIS OF LUMBAR REGION WITHOUT NEUROGENIC CLAUDICATION: ICD-10-CM

## 2023-09-22 DIAGNOSIS — M96.1 POSTLAMINECTOMY SYNDROME OF LUMBAR REGION: ICD-10-CM

## 2023-09-22 DIAGNOSIS — M51.36 DDD (DEGENERATIVE DISC DISEASE), LUMBAR: ICD-10-CM

## 2023-09-22 DIAGNOSIS — M54.42 CHRONIC MIDLINE LOW BACK PAIN WITH LEFT-SIDED SCIATICA: ICD-10-CM

## 2023-09-22 DIAGNOSIS — I25.118 CORONARY ARTERY DISEASE OF NATIVE ARTERY OF NATIVE HEART WITH STABLE ANGINA PECTORIS (HCC): ICD-10-CM

## 2023-09-22 DIAGNOSIS — G89.29 CHRONIC MIDLINE LOW BACK PAIN WITH LEFT-SIDED SCIATICA: ICD-10-CM

## 2023-09-22 RX ORDER — NITROGLYCERIN 0.4 MG/1
TABLET SUBLINGUAL
Qty: 25 TABLET | Refills: 2 | OUTPATIENT
Start: 2023-09-22

## 2023-09-22 RX ORDER — NITROGLYCERIN 0.4 MG/1
TABLET SUBLINGUAL
Qty: 25 TABLET | Refills: 2 | Status: CANCELLED | OUTPATIENT
Start: 2023-09-22

## 2023-09-22 RX ORDER — OXYCODONE HYDROCHLORIDE 10 MG/1
10 TABLET ORAL EVERY 8 HOURS PRN
Qty: 90 TABLET | Refills: 0 | OUTPATIENT
Start: 2023-09-22 | End: 2023-10-22

## 2023-09-22 RX ORDER — GABAPENTIN 300 MG/1
300 CAPSULE ORAL 3 TIMES DAILY
Qty: 270 CAPSULE | Refills: 3 | OUTPATIENT
Start: 2023-09-22 | End: 2023-12-21

## 2023-09-22 RX ORDER — OXYCODONE HYDROCHLORIDE 10 MG/1
10 TABLET ORAL EVERY 8 HOURS PRN
Qty: 90 TABLET | Refills: 0 | Status: CANCELLED | OUTPATIENT
Start: 2023-09-22 | End: 2023-10-22

## 2023-09-25 ENCOUNTER — PATIENT MESSAGE (OUTPATIENT)
Dept: FAMILY MEDICINE CLINIC | Age: 58
End: 2023-09-25

## 2023-09-25 DIAGNOSIS — J44.9 CHRONIC OBSTRUCTIVE PULMONARY DISEASE, UNSPECIFIED COPD TYPE (HCC): Primary | ICD-10-CM

## 2023-09-25 DIAGNOSIS — M48.061 SPINAL STENOSIS OF LUMBAR REGION WITHOUT NEUROGENIC CLAUDICATION: ICD-10-CM

## 2023-09-25 DIAGNOSIS — M51.36 DDD (DEGENERATIVE DISC DISEASE), LUMBAR: ICD-10-CM

## 2023-09-25 DIAGNOSIS — I25.118 CORONARY ARTERY DISEASE OF NATIVE ARTERY OF NATIVE HEART WITH STABLE ANGINA PECTORIS (HCC): ICD-10-CM

## 2023-09-25 DIAGNOSIS — G89.29 CHRONIC MIDLINE LOW BACK PAIN WITH LEFT-SIDED SCIATICA: ICD-10-CM

## 2023-09-25 DIAGNOSIS — M54.42 CHRONIC MIDLINE LOW BACK PAIN WITH LEFT-SIDED SCIATICA: ICD-10-CM

## 2023-09-25 DIAGNOSIS — M96.1 POSTLAMINECTOMY SYNDROME OF LUMBAR REGION: ICD-10-CM

## 2023-09-25 RX ORDER — GUAIFENESIN 600 MG/1
1200 TABLET, EXTENDED RELEASE ORAL 2 TIMES DAILY
Qty: 60 TABLET | Refills: 0 | Status: SHIPPED | OUTPATIENT
Start: 2023-09-25 | End: 2023-10-10

## 2023-09-25 RX ORDER — OXYCODONE HYDROCHLORIDE 10 MG/1
10 TABLET ORAL EVERY 8 HOURS PRN
Qty: 90 TABLET | Refills: 0 | Status: SHIPPED | OUTPATIENT
Start: 2023-09-25 | End: 2023-10-25

## 2023-09-25 RX ORDER — NITROGLYCERIN 0.4 MG/1
TABLET SUBLINGUAL
Qty: 25 TABLET | Refills: 2 | Status: SHIPPED | OUTPATIENT
Start: 2023-09-25

## 2023-09-25 RX ORDER — GUAIFENESIN 600 MG/1
600 TABLET, EXTENDED RELEASE ORAL 2 TIMES DAILY PRN
Qty: 60 TABLET | Refills: 3 | Status: SHIPPED | OUTPATIENT
Start: 2023-09-25

## 2023-09-25 NOTE — TELEPHONE ENCOUNTER
From: Spencer Peralta. To: Dr. Raquel Clark: 9/25/2023 8:15 AM EDT  Subject: Meds     I can not believe that this office let me run out of my medication. Can you please refill my pain medicine OxyCODONE 10 mg one every 8HOURS as needed. Nitroglycerin0.4 tablets Guaifenesin er.  600 tablet

## 2023-10-05 ENCOUNTER — PATIENT MESSAGE (OUTPATIENT)
Dept: FAMILY MEDICINE CLINIC | Age: 58
End: 2023-10-05

## 2023-10-05 DIAGNOSIS — E11.42 TYPE 2 DIABETES MELLITUS WITH DIABETIC POLYNEUROPATHY, WITH LONG-TERM CURRENT USE OF INSULIN (HCC): Primary | ICD-10-CM

## 2023-10-05 DIAGNOSIS — E66.9 DIABETES MELLITUS TYPE 2 IN OBESE (HCC): ICD-10-CM

## 2023-10-05 DIAGNOSIS — I50.42 CHRONIC COMBINED SYSTOLIC AND DIASTOLIC CONGESTIVE HEART FAILURE (HCC): ICD-10-CM

## 2023-10-05 DIAGNOSIS — Z79.4 TYPE 2 DIABETES MELLITUS WITH DIABETIC POLYNEUROPATHY, WITH LONG-TERM CURRENT USE OF INSULIN (HCC): Primary | ICD-10-CM

## 2023-10-05 DIAGNOSIS — E11.69 DIABETES MELLITUS TYPE 2 IN OBESE (HCC): ICD-10-CM

## 2023-10-05 NOTE — TELEPHONE ENCOUNTER
From: Balaji Blanco.   To: Dr. Mc Hamm: 10/5/2023 1:55 PM EDT  Subject: Weight     Is there anything you can do to help me lose weight this is killing me please help me

## 2023-10-06 DIAGNOSIS — K59.01 SLOW TRANSIT CONSTIPATION: ICD-10-CM

## 2023-10-09 RX ORDER — DOCUSATE SODIUM 100 MG/1
CAPSULE, LIQUID FILLED ORAL
Qty: 180 CAPSULE | Refills: 3 | Status: SHIPPED | OUTPATIENT
Start: 2023-10-09

## 2023-10-09 NOTE — TELEPHONE ENCOUNTER
Please Approve or Refuse.   Send to Pharmacy per Pt's Request:      Next Visit Date:  12/6/2023   Last Visit Date: 6/7/2023    Hemoglobin A1C (%)   Date Value   06/27/2023 8.7 (H)   04/26/2023 8.8   01/12/2023 9.5             ( goal A1C is < 7)   BP Readings from Last 3 Encounters:   08/29/23 132/70   06/07/23 110/80   03/22/23 (!) 169/52          (goal 120/80)  BUN   Date Value Ref Range Status   08/21/2023 44 (H) 6 - 20 mg/dL Final     Creatinine   Date Value Ref Range Status   08/21/2023 1.8 (H) 0.7 - 1.2 mg/dL Final     Potassium   Date Value Ref Range Status   08/21/2023 3.3 (L) 3.7 - 5.3 mmol/L Final

## 2023-10-11 ENCOUNTER — PATIENT MESSAGE (OUTPATIENT)
Dept: FAMILY MEDICINE CLINIC | Age: 58
End: 2023-10-11

## 2023-10-11 DIAGNOSIS — K21.9 GASTROESOPHAGEAL REFLUX DISEASE WITHOUT ESOPHAGITIS: Primary | ICD-10-CM

## 2023-10-12 RX ORDER — FAMOTIDINE 40 MG/1
20 TABLET, FILM COATED ORAL 2 TIMES DAILY
Qty: 45 TABLET | Refills: 3 | Status: SHIPPED | OUTPATIENT
Start: 2023-10-12

## 2023-10-12 NOTE — TELEPHONE ENCOUNTER
From: Dr. Keesha Koch  To: Ree Adler. Sent: 10/11/2023 8:15 PM EDT  Subject: Pantoprazole changed to famotidine    Miguel,    I need to let you know, I have received notification from your insurance, that we should change pantoprazole, I will change it to Pepcid    Long-term use of pantoprazole and can worsen the kidney function    Can we stop pantoprazole and change to famotidine instead/Pepcid? Also please consider getting the flu shot at the pharmacy and COVID shot if you want  If you have any questions, please let me know.     Please let me know and I can send to the pharmacy    Please schedule appointment with Ely Gresham, he has ordered some labs for you, you can also send him a [employer] message as his office can schedule you    Keesha Koch MD  [address] [address]  Dept: [phone]  Dept Fax: [phone]

## 2023-10-20 DIAGNOSIS — F41.9 ANXIETY: ICD-10-CM

## 2023-10-20 DIAGNOSIS — M1A.30X0 CHRONIC GOUT DUE TO RENAL IMPAIRMENT WITHOUT TOPHUS, UNSPECIFIED SITE: ICD-10-CM

## 2023-10-20 DIAGNOSIS — F33.2 MDD (MAJOR DEPRESSIVE DISORDER), RECURRENT SEVERE, WITHOUT PSYCHOSIS (HCC): ICD-10-CM

## 2023-10-20 DIAGNOSIS — E11.42 TYPE 2 DIABETES MELLITUS WITH DIABETIC POLYNEUROPATHY, WITH LONG-TERM CURRENT USE OF INSULIN (HCC): ICD-10-CM

## 2023-10-20 DIAGNOSIS — Z79.4 TYPE 2 DIABETES MELLITUS WITH DIABETIC POLYNEUROPATHY, WITH LONG-TERM CURRENT USE OF INSULIN (HCC): ICD-10-CM

## 2023-10-23 ENCOUNTER — PATIENT MESSAGE (OUTPATIENT)
Dept: FAMILY MEDICINE CLINIC | Age: 58
End: 2023-10-23

## 2023-10-23 DIAGNOSIS — M48.061 SPINAL STENOSIS OF LUMBAR REGION WITHOUT NEUROGENIC CLAUDICATION: ICD-10-CM

## 2023-10-23 DIAGNOSIS — M54.42 CHRONIC MIDLINE LOW BACK PAIN WITH LEFT-SIDED SCIATICA: ICD-10-CM

## 2023-10-23 DIAGNOSIS — M51.36 DDD (DEGENERATIVE DISC DISEASE), LUMBAR: ICD-10-CM

## 2023-10-23 DIAGNOSIS — M96.1 POSTLAMINECTOMY SYNDROME OF LUMBAR REGION: ICD-10-CM

## 2023-10-23 DIAGNOSIS — G89.29 CHRONIC MIDLINE LOW BACK PAIN WITH LEFT-SIDED SCIATICA: ICD-10-CM

## 2023-10-23 RX ORDER — OXYCODONE HYDROCHLORIDE 10 MG/1
10 TABLET ORAL EVERY 8 HOURS PRN
Qty: 90 TABLET | Refills: 0 | Status: SHIPPED | OUTPATIENT
Start: 2023-10-23 | End: 2023-11-22

## 2023-10-23 RX ORDER — VENLAFAXINE HYDROCHLORIDE 150 MG/1
150 CAPSULE, EXTENDED RELEASE ORAL EVERY MORNING
Qty: 90 CAPSULE | Refills: 0 | Status: SHIPPED | OUTPATIENT
Start: 2023-10-23

## 2023-10-23 RX ORDER — ALLOPURINOL 300 MG/1
300 TABLET ORAL DAILY
Qty: 90 TABLET | Refills: 3 | Status: SHIPPED | OUTPATIENT
Start: 2023-10-23

## 2023-10-23 RX ORDER — CHOLECALCIFEROL (VITAMIN D3) 125 MCG
500 CAPSULE ORAL DAILY
Qty: 90 TABLET | Refills: 3 | Status: SHIPPED | OUTPATIENT
Start: 2023-10-23

## 2023-10-23 NOTE — TELEPHONE ENCOUNTER
Please Approve or Refuse.   Send to Pharmacy per Pt's Request:      Next Visit Date:  10/20/2023   Last Visit Date: 6/7/2023    Hemoglobin A1C (%)   Date Value   06/27/2023 8.7 (H)   04/26/2023 8.8   01/12/2023 9.5             ( goal A1C is < 7)   BP Readings from Last 3 Encounters:   08/29/23 132/70   06/07/23 110/80   03/22/23 (!) 169/52          (goal 120/80)  BUN   Date Value Ref Range Status   08/21/2023 44 (H) 6 - 20 mg/dL Final     Creatinine   Date Value Ref Range Status   08/21/2023 1.8 (H) 0.7 - 1.2 mg/dL Final     Potassium   Date Value Ref Range Status   08/21/2023 3.3 (L) 3.7 - 5.3 mmol/L Final

## 2023-11-01 ENCOUNTER — E-VISIT (OUTPATIENT)
Dept: FAMILY MEDICINE CLINIC | Age: 58
End: 2023-11-01
Payer: COMMERCIAL

## 2023-11-01 DIAGNOSIS — E11.42 TYPE 2 DIABETES MELLITUS WITH DIABETIC POLYNEUROPATHY, WITH LONG-TERM CURRENT USE OF INSULIN (HCC): ICD-10-CM

## 2023-11-01 DIAGNOSIS — J44.1 CHRONIC OBSTRUCTIVE PULMONARY DISEASE WITH ACUTE EXACERBATION (HCC): ICD-10-CM

## 2023-11-01 DIAGNOSIS — Z79.4 TYPE 2 DIABETES MELLITUS WITH DIABETIC POLYNEUROPATHY, WITH LONG-TERM CURRENT USE OF INSULIN (HCC): ICD-10-CM

## 2023-11-01 DIAGNOSIS — B34.9 ACUTE VIRAL SYNDROME: Primary | ICD-10-CM

## 2023-11-01 PROCEDURE — 99423 OL DIG E/M SVC 21+ MIN: CPT | Performed by: FAMILY MEDICINE

## 2023-11-01 RX ORDER — AZITHROMYCIN 250 MG/1
TABLET, FILM COATED ORAL
Qty: 6 TABLET | Refills: 0 | Status: SHIPPED | OUTPATIENT
Start: 2023-11-01 | End: 2023-11-06

## 2023-11-01 RX ORDER — PREDNISONE 10 MG/1
50 TABLET ORAL DAILY
Qty: 35 TABLET | Refills: 0 | Status: SHIPPED | OUTPATIENT
Start: 2023-11-01 | End: 2023-11-08

## 2023-11-01 RX ORDER — BENZONATATE 100 MG/1
100 CAPSULE ORAL 3 TIMES DAILY PRN
Qty: 21 CAPSULE | Refills: 0 | Status: SHIPPED | OUTPATIENT
Start: 2023-11-01 | End: 2023-11-08

## 2023-11-01 RX ORDER — COVID-19 ANTIGEN TEST
KIT MISCELLANEOUS
Qty: 2 KIT | Refills: 5 | Status: SHIPPED | OUTPATIENT
Start: 2023-11-01

## 2023-11-01 ASSESSMENT — LIFESTYLE VARIABLES: SMOKING_STATUS: NO, I HAVE NEVER SMOKED

## 2023-11-03 ENCOUNTER — PATIENT MESSAGE (OUTPATIENT)
Dept: FAMILY MEDICINE CLINIC | Age: 58
End: 2023-11-03

## 2023-11-03 DIAGNOSIS — M48.061 SPINAL STENOSIS OF LUMBAR REGION WITHOUT NEUROGENIC CLAUDICATION: ICD-10-CM

## 2023-11-03 DIAGNOSIS — M54.9 CHRONIC BACK PAIN GREATER THAN 3 MONTHS DURATION: ICD-10-CM

## 2023-11-03 DIAGNOSIS — L73.2 HIDRADENITIS SUPPURATIVA OF LEFT AXILLA: Primary | ICD-10-CM

## 2023-11-03 DIAGNOSIS — G89.29 CHRONIC BACK PAIN GREATER THAN 3 MONTHS DURATION: ICD-10-CM

## 2023-11-06 RX ORDER — TIZANIDINE 4 MG/1
TABLET ORAL
Qty: 90 TABLET | Refills: 3 | OUTPATIENT
Start: 2023-11-06

## 2023-11-06 RX ORDER — TIZANIDINE 4 MG/1
TABLET ORAL
Qty: 90 TABLET | Refills: 3 | Status: SHIPPED | OUTPATIENT
Start: 2023-11-06 | End: 2023-11-07 | Stop reason: SDUPTHER

## 2023-11-06 NOTE — TELEPHONE ENCOUNTER
From: Feliz Cohn.   To: Dr. Savana Mckay: 11/3/2023 7:46 PM EDT  Subject: Refill    Refill for mupirocin 2% ointment

## 2023-11-06 NOTE — TELEPHONE ENCOUNTER
Please Approve or Refuse.  Send to Pharmacy per Pt's Request:      Next Visit Date:  11/3/2023   Last Visit Date: 11/1/2023    Hemoglobin A1C (%)   Date Value   06/27/2023 8.7 (H)   04/26/2023 8.8   01/12/2023 9.5             ( goal A1C is < 7)   BP Readings from Last 3 Encounters:   08/29/23 132/70   06/07/23 110/80   03/22/23 (!) 169/52          (goal 120/80)  BUN   Date Value Ref Range Status   08/21/2023 44 (H) 6 - 20 mg/dL Final     Creatinine   Date Value Ref Range Status   08/21/2023 1.8 (H) 0.7 - 1.2 mg/dL Final     Potassium   Date Value Ref Range Status   08/21/2023 3.3 (L) 3.7 - 5.3 mmol/L Final

## 2023-11-06 NOTE — TELEPHONE ENCOUNTER
Please Approve or Refuse.   Send to Pharmacy per Pt's Request:      Next Visit Date:  12/6/2023   Last Visit Date: 11/1/2023    Hemoglobin A1C (%)   Date Value   06/27/2023 8.7 (H)   04/26/2023 8.8   01/12/2023 9.5             ( goal A1C is < 7)   BP Readings from Last 3 Encounters:   08/29/23 132/70   06/07/23 110/80   03/22/23 (!) 169/52          (goal 120/80)  BUN   Date Value Ref Range Status   08/21/2023 44 (H) 6 - 20 mg/dL Final     Creatinine   Date Value Ref Range Status   08/21/2023 1.8 (H) 0.7 - 1.2 mg/dL Final     Potassium   Date Value Ref Range Status   08/21/2023 3.3 (L) 3.7 - 5.3 mmol/L Final

## 2023-11-07 DIAGNOSIS — M48.061 SPINAL STENOSIS OF LUMBAR REGION WITHOUT NEUROGENIC CLAUDICATION: ICD-10-CM

## 2023-11-07 DIAGNOSIS — M54.9 CHRONIC BACK PAIN GREATER THAN 3 MONTHS DURATION: ICD-10-CM

## 2023-11-07 DIAGNOSIS — G89.29 CHRONIC BACK PAIN GREATER THAN 3 MONTHS DURATION: ICD-10-CM

## 2023-11-07 RX ORDER — TIZANIDINE 4 MG/1
4 TABLET ORAL EVERY 8 HOURS PRN
Qty: 90 TABLET | Refills: 3 | Status: SHIPPED | OUTPATIENT
Start: 2023-11-07

## 2023-11-07 NOTE — PROGRESS NOTES
Diagnosis Orders   1. Spinal stenosis of lumbar region without neurogenic claudication  tiZANidine (ZANAFLEX) 4 MG tablet      2.  Chronic back pain greater than 3 months duration  tiZANidine (ZANAFLEX) 4 MG tablet           Future Appointments   Date Time Provider [address] [place] [address]   12/6/2023  3:00 PM MD nani Ross sc ANT   3/8/2024 10:00 AM [facility]

## 2023-11-13 RX ORDER — SYRINGE-NEEDLE,INSULIN,0.5 ML 27GX1/2"
SYRINGE, EMPTY DISPOSABLE MISCELLANEOUS
Qty: 200 EACH | Refills: 3 | Status: SHIPPED | OUTPATIENT
Start: 2023-11-13

## 2023-11-17 DIAGNOSIS — I10 ESSENTIAL HYPERTENSION: ICD-10-CM

## 2023-11-17 DIAGNOSIS — I50.32 CHRONIC DIASTOLIC CONGESTIVE HEART FAILURE (HCC): ICD-10-CM

## 2023-11-17 DIAGNOSIS — D50.0 IRON DEFICIENCY ANEMIA DUE TO CHRONIC BLOOD LOSS: ICD-10-CM

## 2023-11-18 RX ORDER — FERROUS SULFATE 325(65) MG
TABLET ORAL
Qty: 90 TABLET | Refills: 3 | Status: SHIPPED | OUTPATIENT
Start: 2023-11-18

## 2023-11-18 RX ORDER — METOPROLOL TARTRATE 50 MG/1
TABLET, FILM COATED ORAL
Qty: 180 TABLET | Refills: 3 | Status: SHIPPED | OUTPATIENT
Start: 2023-11-18

## 2023-11-18 NOTE — TELEPHONE ENCOUNTER
nonsenile cataract     Presbyopia     Postlaminectomy syndrome of lumbar region     Venous insufficiency of left lower extremity     Bilateral hearing loss     Recurrent infection of skin     Vitamin B 12 deficiency     Mobility impaired     Chronic respiratory failure with hypoxia (HCC)     Hypomagnesemia     Psoriasis     Lymphedema of both lower extremities     Venous insufficiency of both lower extremities     PVD (peripheral vascular disease) (HCC)     Chronic gout due to renal impairment without tophus     LBBB (left bundle branch block)     Normocytic anemia     Benign hypertensive heart and CKD, stage 3 (GFR 30-59), w CHF ([address])     Hypokalemia     MDD (major depressive disorder), recurrent severe, without psychosis ([address])     Hidradenitis suppurativa of left axilla     Diabetic ulcer of toe of right foot associated with type 2 diabetes mellitus, with muscle involvement without evidence of necrosis ([address])     Frequency of urination     DNR (do not resuscitate) discussion

## 2023-11-20 ENCOUNTER — PATIENT MESSAGE (OUTPATIENT)
Dept: FAMILY MEDICINE CLINIC | Age: 58
End: 2023-11-20

## 2023-11-20 DIAGNOSIS — M96.1 POSTLAMINECTOMY SYNDROME OF LUMBAR REGION: ICD-10-CM

## 2023-11-20 DIAGNOSIS — M54.42 CHRONIC MIDLINE LOW BACK PAIN WITH LEFT-SIDED SCIATICA: ICD-10-CM

## 2023-11-20 DIAGNOSIS — G89.29 CHRONIC MIDLINE LOW BACK PAIN WITH LEFT-SIDED SCIATICA: ICD-10-CM

## 2023-11-20 DIAGNOSIS — M51.36 DDD (DEGENERATIVE DISC DISEASE), LUMBAR: ICD-10-CM

## 2023-11-20 DIAGNOSIS — M48.061 SPINAL STENOSIS OF LUMBAR REGION WITHOUT NEUROGENIC CLAUDICATION: ICD-10-CM

## 2023-11-21 DIAGNOSIS — G89.29 CHRONIC MIDLINE LOW BACK PAIN WITH LEFT-SIDED SCIATICA: ICD-10-CM

## 2023-11-21 DIAGNOSIS — M96.1 POSTLAMINECTOMY SYNDROME OF LUMBAR REGION: ICD-10-CM

## 2023-11-21 DIAGNOSIS — M54.42 CHRONIC MIDLINE LOW BACK PAIN WITH LEFT-SIDED SCIATICA: ICD-10-CM

## 2023-11-21 DIAGNOSIS — M48.061 SPINAL STENOSIS OF LUMBAR REGION WITHOUT NEUROGENIC CLAUDICATION: ICD-10-CM

## 2023-11-21 DIAGNOSIS — M51.36 DDD (DEGENERATIVE DISC DISEASE), LUMBAR: ICD-10-CM

## 2023-11-21 RX ORDER — OXYCODONE HYDROCHLORIDE 10 MG/1
10 TABLET ORAL EVERY 8 HOURS PRN
Qty: 90 TABLET | Refills: 0 | Status: SHIPPED | OUTPATIENT
Start: 2023-11-21 | End: 2023-11-21 | Stop reason: SDUPTHER

## 2023-11-21 RX ORDER — OXYCODONE HYDROCHLORIDE 10 MG/1
10 TABLET ORAL EVERY 8 HOURS PRN
Qty: 90 TABLET | Refills: 0 | Status: SHIPPED | OUTPATIENT
Start: 2023-11-21 | End: 2023-12-21

## 2023-11-21 NOTE — TELEPHONE ENCOUNTER
Please Approve or Refuse.   Send to Pharmacy per Pt's Request: sherri     Next Visit Date:  12/6/2023   Last Visit Date: 11/1/2023    Hemoglobin A1C (%)   Date Value   06/27/2023 8.7 (H)   04/26/2023 8.8   01/12/2023 9.5             ( goal A1C is < 7)   BP Readings from Last 3 Encounters:   08/29/23 132/70   06/07/23 110/80   03/22/23 (!) 169/52          (goal 120/80)  BUN   Date Value Ref Range Status   08/21/2023 44 (H) 6 - 20 mg/dL Final     Creatinine   Date Value Ref Range Status   08/21/2023 1.8 (H) 0.7 - 1.2 mg/dL Final     Potassium   Date Value Ref Range Status   08/21/2023 3.3 (L) 3.7 - 5.3 mmol/L Final

## 2023-11-21 NOTE — TELEPHONE ENCOUNTER
[facility] does not have the pain medication in stock and they do not know when it will be back in stock, they told patient to call PCP and have new script sent to [facility] in [place].

## 2023-11-21 NOTE — TELEPHONE ENCOUNTER
From: Jazmín Barnett. To: Dr. Yonny Lomeli: 11/20/2023 7:27 PM EST  Subject: Meds     Can you please refill my pain meds I will be out of them Friday.  Exycodone 10MG tablets one every 8 hours for pain

## 2023-11-30 ENCOUNTER — TELEPHONE (OUTPATIENT)
Dept: FAMILY MEDICINE CLINIC | Age: 58
End: 2023-11-30

## 2023-11-30 NOTE — TELEPHONE ENCOUNTER
Noted  Future Appointments   Date Time Provider [address]00  [address]   12/6/2023 10:30 AM Geni Mazariegos MD [facility]   12/6/2023  3:00 PM MD nani Oden [facility]   3/8/2024 10:40 AM Jesse Koenig  [facility] Neurology -   3/29/2024 11:00 AM [facility]

## 2023-11-30 NOTE — TELEPHONE ENCOUNTER
I left message yesterday for staff to check on this patient and help him w Evisit, I placed it in the box and also in Epic, forward message    I also sent him mychart  I also offered him video visit at 12 pm yesterday but he refused.       Please help him submit an evisit for rash-has hidradenitis, or video at 4 pm or 12 pm today

## 2023-12-02 ENCOUNTER — HOSPITAL ENCOUNTER (OUTPATIENT)
Age: 58
Discharge: HOME | End: 2023-12-02
Payer: COMMERCIAL

## 2023-12-02 DIAGNOSIS — I12.9 BENIGN HYPERTENSION WITH CKD (CHRONIC KIDNEY DISEASE) STAGE III (HCC): ICD-10-CM

## 2023-12-02 DIAGNOSIS — E87.6 HYPOKALEMIA: ICD-10-CM

## 2023-12-02 DIAGNOSIS — D63.1 ANEMIA IN STAGE 3B CHRONIC KIDNEY DISEASE (HCC): ICD-10-CM

## 2023-12-02 DIAGNOSIS — N18.32 ANEMIA IN STAGE 3B CHRONIC KIDNEY DISEASE (HCC): ICD-10-CM

## 2023-12-02 DIAGNOSIS — E13.22 SECONDARY DIABETES MELLITUS WITH STAGE 3 CHRONIC KIDNEY DISEASE (HCC): ICD-10-CM

## 2023-12-02 DIAGNOSIS — N18.32 STAGE 3B CHRONIC KIDNEY DISEASE (HCC): ICD-10-CM

## 2023-12-02 DIAGNOSIS — N18.30 BENIGN HYPERTENSION WITH CKD (CHRONIC KIDNEY DISEASE) STAGE III (HCC): ICD-10-CM

## 2023-12-02 DIAGNOSIS — N18.30 SECONDARY DIABETES MELLITUS WITH STAGE 3 CHRONIC KIDNEY DISEASE (HCC): ICD-10-CM

## 2023-12-02 DIAGNOSIS — E79.0 ELEVATED URIC ACID IN BLOOD: ICD-10-CM

## 2023-12-02 LAB
ANION GAP SERPL CALCULATED.3IONS-SCNC: 15 MMOL/L (ref 9–17)
BACTERIA URNS QL MICRO: ABNORMAL
BASOPHILS # BLD: 0.1 K/UL (ref 0–0.2)
BASOPHILS NFR BLD: 1 % (ref 0–2)
BILIRUB UR QL STRIP: NEGATIVE
BUN SERPL-MCNC: 44 MG/DL (ref 6–20)
CALCIUM SERPL-MCNC: 9.7 MG/DL (ref 8.6–10.4)
CASTS #/AREA URNS LPF: ABNORMAL /LPF
CHLORIDE SERPL-SCNC: 94 MMOL/L (ref 98–107)
CLARITY UR: CLEAR
CO2 SERPL-SCNC: 31 MMOL/L (ref 20–31)
COLOR UR: YELLOW
CREAT SERPL-MCNC: 2 MG/DL (ref 0.7–1.2)
EOSINOPHIL # BLD: 0.2 K/UL (ref 0–0.4)
EOSINOPHILS RELATIVE PERCENT: 3 % (ref 0–4)
EPI CELLS #/AREA URNS HPF: ABNORMAL /HPF
ERYTHROCYTE [DISTWIDTH] IN BLOOD BY AUTOMATED COUNT: 16.8 % (ref 11.5–14.9)
GFR SERPL CREATININE-BSD FRML MDRD: 38 ML/MIN/1.73M2
GLUCOSE SERPL-MCNC: 293 MG/DL (ref 70–99)
GLUCOSE UR STRIP-MCNC: ABNORMAL MG/DL
HCT VFR BLD AUTO: 36.5 % (ref 41–53)
HGB BLD-MCNC: 11.7 G/DL (ref 13.5–17.5)
HGB UR QL STRIP.AUTO: NEGATIVE
KETONES UR STRIP-MCNC: NEGATIVE MG/DL
LEUKOCYTE ESTERASE UR QL STRIP: NEGATIVE
LYMPHOCYTES NFR BLD: 0.9 K/UL (ref 1–4.8)
LYMPHOCYTES RELATIVE PERCENT: 11 % (ref 24–44)
MAGNESIUM SERPL-MCNC: 1.9 MG/DL (ref 1.6–2.6)
MCH RBC QN AUTO: 27.7 PG (ref 26–34)
MCHC RBC AUTO-ENTMCNC: 32 G/DL (ref 31–37)
MCV RBC AUTO: 86.5 FL (ref 80–100)
MONOCYTES NFR BLD: 0.6 K/UL (ref 0.1–1.3)
MONOCYTES NFR BLD: 8 % (ref 1–7)
NEUTROPHILS NFR BLD: 77 % (ref 36–66)
NEUTS SEG NFR BLD: 6.1 K/UL (ref 1.3–9.1)
NITRITE UR QL STRIP: NEGATIVE
PH UR STRIP: 6.5 [PH] (ref 5–8)
PHOSPHATE SERPL-MCNC: 3 MG/DL (ref 2.5–4.5)
PLATELET # BLD AUTO: 205 K/UL (ref 150–450)
PMV BLD AUTO: 9.3 FL (ref 6–12)
POTASSIUM SERPL-SCNC: 3.2 MMOL/L (ref 3.7–5.3)
PROT UR STRIP-MCNC: NEGATIVE MG/DL
RBC # BLD AUTO: 4.22 M/UL (ref 4.5–5.9)
RBC #/AREA URNS HPF: ABNORMAL /HPF
SODIUM SERPL-SCNC: 140 MMOL/L (ref 135–144)
SP GR UR STRIP: 1.01 (ref 1–1.03)
UROBILINOGEN UR STRIP-ACNC: NORMAL EU/DL (ref 0–1)
WBC #/AREA URNS HPF: ABNORMAL /HPF
WBC OTHER # BLD: 7.9 K/UL (ref 3.5–11)

## 2023-12-02 PROCEDURE — 83735 ASSAY OF MAGNESIUM: CPT

## 2023-12-02 PROCEDURE — 85025 COMPLETE CBC W/AUTO DIFF WBC: CPT

## 2023-12-02 PROCEDURE — 80048 BASIC METABOLIC PNL TOTAL CA: CPT

## 2023-12-02 PROCEDURE — 84100 ASSAY OF PHOSPHORUS: CPT

## 2023-12-02 PROCEDURE — 36415 COLL VENOUS BLD VENIPUNCTURE: CPT

## 2023-12-02 PROCEDURE — 81001 URINALYSIS AUTO W/SCOPE: CPT

## 2023-12-07 ENCOUNTER — TELEMEDICINE (OUTPATIENT)
Dept: FAMILY MEDICINE CLINIC | Age: 58
End: 2023-12-07
Payer: COMMERCIAL

## 2023-12-07 DIAGNOSIS — I50.42 CHRONIC COMBINED SYSTOLIC AND DIASTOLIC CONGESTIVE HEART FAILURE (HCC): ICD-10-CM

## 2023-12-07 DIAGNOSIS — M48.061 SPINAL STENOSIS OF LUMBAR REGION WITHOUT NEUROGENIC CLAUDICATION: ICD-10-CM

## 2023-12-07 DIAGNOSIS — J44.9 CHRONIC OBSTRUCTIVE PULMONARY DISEASE, UNSPECIFIED COPD TYPE (HCC): ICD-10-CM

## 2023-12-07 DIAGNOSIS — E55.9 VITAMIN D DEFICIENCY: ICD-10-CM

## 2023-12-07 DIAGNOSIS — J96.11 CHRONIC RESPIRATORY FAILURE WITH HYPOXIA (HCC): ICD-10-CM

## 2023-12-07 DIAGNOSIS — I13.0 BENIGN HYPERTENSIVE HEART AND CKD, STAGE 3 (GFR 30-59), W CHF (HCC): ICD-10-CM

## 2023-12-07 DIAGNOSIS — Z51.81 MEDICATION MONITORING ENCOUNTER: ICD-10-CM

## 2023-12-07 DIAGNOSIS — Z79.4 TYPE 2 DIABETES MELLITUS WITH DIABETIC POLYNEUROPATHY, WITH LONG-TERM CURRENT USE OF INSULIN (HCC): ICD-10-CM

## 2023-12-07 DIAGNOSIS — N18.30 BENIGN HYPERTENSIVE HEART AND CKD, STAGE 3 (GFR 30-59), W CHF (HCC): ICD-10-CM

## 2023-12-07 DIAGNOSIS — F33.1 MODERATE EPISODE OF RECURRENT MAJOR DEPRESSIVE DISORDER (HCC): ICD-10-CM

## 2023-12-07 DIAGNOSIS — Z12.5 PROSTATE CANCER SCREENING: ICD-10-CM

## 2023-12-07 DIAGNOSIS — E11.42 TYPE 2 DIABETES MELLITUS WITH DIABETIC POLYNEUROPATHY, WITH LONG-TERM CURRENT USE OF INSULIN (HCC): ICD-10-CM

## 2023-12-07 DIAGNOSIS — L73.2 HIDRADENITIS SUPPURATIVA: Primary | ICD-10-CM

## 2023-12-07 PROCEDURE — 3052F HG A1C>EQUAL 8.0%<EQUAL 9.0%: CPT | Performed by: FAMILY MEDICINE

## 2023-12-07 PROCEDURE — 99214 OFFICE O/P EST MOD 30 MIN: CPT | Performed by: FAMILY MEDICINE

## 2023-12-07 PROCEDURE — 2022F DILAT RTA XM EVC RTNOPTHY: CPT | Performed by: FAMILY MEDICINE

## 2023-12-07 PROCEDURE — 3017F COLORECTAL CA SCREEN DOC REV: CPT | Performed by: FAMILY MEDICINE

## 2023-12-07 PROCEDURE — G8427 DOCREV CUR MEDS BY ELIG CLIN: HCPCS | Performed by: FAMILY MEDICINE

## 2023-12-07 RX ORDER — LANOLIN ALCOHOL/MO/W.PET/CERES
400 CREAM (GRAM) TOPICAL 2 TIMES DAILY
Qty: 180 TABLET | Refills: 3 | Status: SHIPPED | OUTPATIENT
Start: 2023-12-07

## 2023-12-07 RX ORDER — NALOXONE HYDROCHLORIDE 4 MG/.1ML
1 SPRAY NASAL PRN
Qty: 1 EACH | Refills: 3 | Status: SHIPPED | OUTPATIENT
Start: 2023-12-07

## 2023-12-07 RX ORDER — CLINDAMYCIN PHOSPHATE 10 UG/ML
LOTION TOPICAL
Qty: 60 ML | Refills: 3 | Status: SHIPPED | OUTPATIENT
Start: 2023-12-07

## 2023-12-07 RX ORDER — NYSTATIN 100000 [USP'U]/G
POWDER TOPICAL
Qty: 60 G | Refills: 3 | Status: SHIPPED | OUTPATIENT
Start: 2023-12-07

## 2023-12-07 RX ORDER — CHOLECALCIFEROL (VITAMIN D3) 125 MCG
500 CAPSULE ORAL DAILY
Qty: 90 TABLET | Refills: 3 | Status: SHIPPED | OUTPATIENT
Start: 2023-12-07

## 2023-12-07 RX ORDER — GUAIFENESIN 600 MG/1
600 TABLET, EXTENDED RELEASE ORAL 2 TIMES DAILY PRN
Qty: 60 TABLET | Refills: 3 | Status: SHIPPED | OUTPATIENT
Start: 2023-12-07

## 2023-12-07 RX ORDER — DOXYCYCLINE HYCLATE 100 MG
100 TABLET ORAL 2 TIMES DAILY
Qty: 20 TABLET | Refills: 1 | Status: SHIPPED | OUTPATIENT
Start: 2023-12-07

## 2023-12-07 ASSESSMENT — PATIENT HEALTH QUESTIONNAIRE - PHQ9
10. IF YOU CHECKED OFF ANY PROBLEMS, HOW DIFFICULT HAVE THESE PROBLEMS MADE IT FOR YOU TO DO YOUR WORK, TAKE CARE OF THINGS AT HOME, OR GET ALONG WITH OTHER PEOPLE: 1
SUM OF ALL RESPONSES TO PHQ QUESTIONS 1-9: 15
1. LITTLE INTEREST OR PLEASURE IN DOING THINGS: 3
8. MOVING OR SPEAKING SO SLOWLY THAT OTHER PEOPLE COULD HAVE NOTICED. OR THE OPPOSITE, BEING SO FIGETY OR RESTLESS THAT YOU HAVE BEEN MOVING AROUND A LOT MORE THAN USUAL: 0
3. TROUBLE FALLING OR STAYING ASLEEP: 3
SUM OF ALL RESPONSES TO PHQ QUESTIONS 1-9: 15
SUM OF ALL RESPONSES TO PHQ QUESTIONS 1-9: 15
5. POOR APPETITE OR OVEREATING: 3
SUM OF ALL RESPONSES TO PHQ QUESTIONS 1-9: 15
4. FEELING TIRED OR HAVING LITTLE ENERGY: 3
2. FEELING DOWN, DEPRESSED OR HOPELESS: 2
7. TROUBLE CONCENTRATING ON THINGS, SUCH AS READING THE NEWSPAPER OR WATCHING TELEVISION: 0
9. THOUGHTS THAT YOU WOULD BE BETTER OFF DEAD, OR OF HURTING YOURSELF: 0
6. FEELING BAD ABOUT YOURSELF - OR THAT YOU ARE A FAILURE OR HAVE LET YOURSELF OR YOUR FAMILY DOWN: 1
SUM OF ALL RESPONSES TO PHQ9 QUESTIONS 1 & 2: 5

## 2023-12-07 ASSESSMENT — ENCOUNTER SYMPTOMS
BACK PAIN: 1
WHEEZING: 0
ABDOMINAL DISTENTION: 0
COUGH: 1
CHEST TIGHTNESS: 0
CONSTIPATION: 0
NAUSEA: 0
ABDOMINAL PAIN: 0
VOMITING: 0
SHORTNESS OF BREATH: 1
DIARRHEA: 0

## 2023-12-07 NOTE — PROGRESS NOTES
Milford Kanner., was evaluated through a synchronous (real-time) audio-video encounter. The patient (or guardian if applicable) is aware that this is a billable service, which includes applicable co-pays. This Virtual Visit was conducted with patient's (and/or legal guardian's) consent. Patient identification was verified, and a caregiver was present when appropriate. The [facility]  Provider was located at [facility] (Appt Dept): [address]  [address] [facility],  [address] Milford Kanner. (:  1965) is a Established patient, presenting virtually for evaluation of the following:    Assessment & Plan   Below is the assessment and plan developed based on review of pertinent history, physical exam, labs, studies, and medications. 1. Hidradenitis suppurativa  Worsening  -start     doxycycline hyclate (VIBRA-TABS) 100 MG tablet; Take 1 tablet by mouth 2 times daily For 10 days for hidradenitis, Disp-20 tablet, R-1Normal  -Restart   mupirocin (BACTROBAN) 2 % ointment; Apply topically 3 times daily x 10 days. , Disp-30 g, R-3, Normal  -Continue   clindamycin (CLEOCIN T) 1 % lotion; Apply to affected areas daily, Disp-60 mL, R-3, Normal  -Continue   nystatin (MYCOSTATIN) 400982 UNIT/GM powder; Apply 2 times daily in the skin folds for long term. , Disp-60 g, R-3, Normal  Continue washes with benzyl peroxide        2. Type 2 diabetes mellitus with diabetic polyneuropathy, with long-term current use of insulin ([facility])  Improved diabetes  Lab Results   Component Value Date    LABA1C 8.7 (H) 2023    LABA1C 8.8 2023    LABA1C 9.5 2023     Persistent hyperglycemia  Likely hyperglycemia not controlled due to current infection, advised to call Dr. Israel Cuba to readjust Humulin R 500  Recheck labs  Will obtain hemoglobin A1c from Dr. Israel Cuba  -     vitamin B-12 (CYANOCOBALAMIN) 500 MCG tablet;  Take 1 tablet by mouth daily, Disp-90 tablet,

## 2023-12-11 ASSESSMENT — ENCOUNTER SYMPTOMS: APNEA: 1

## 2023-12-27 ENCOUNTER — NURSE ONLY (OUTPATIENT)
Dept: FAMILY MEDICINE CLINIC | Age: 58
End: 2023-12-27
Payer: COMMERCIAL

## 2023-12-27 VITALS — SYSTOLIC BLOOD PRESSURE: 110 MMHG | DIASTOLIC BLOOD PRESSURE: 64 MMHG

## 2023-12-27 DIAGNOSIS — I13.0 BENIGN HYPERTENSIVE HEART AND CKD, STAGE 3 (GFR 30-59), W CHF (HCC): Primary | ICD-10-CM

## 2023-12-27 DIAGNOSIS — N18.30 BENIGN HYPERTENSIVE HEART AND CKD, STAGE 3 (GFR 30-59), W CHF (HCC): Primary | ICD-10-CM

## 2023-12-27 DIAGNOSIS — Z23 NEED FOR INFLUENZA VACCINATION: ICD-10-CM

## 2023-12-27 PROCEDURE — 99211 OFF/OP EST MAY X REQ PHY/QHP: CPT | Performed by: FAMILY MEDICINE

## 2023-12-27 PROCEDURE — G0008 ADMIN INFLUENZA VIRUS VAC: HCPCS | Performed by: FAMILY MEDICINE

## 2023-12-27 PROCEDURE — 90674 CCIIV4 VAC NO PRSV 0.5 ML IM: CPT | Performed by: FAMILY MEDICINE

## 2023-12-27 NOTE — PROGRESS NOTES
Chief Complaint   Patient presents with    Blood Pressure Check    Flu Vaccine        Patient is here for blood pressure recheck. 1. Benign hypertensive heart and CKD, stage 3 (GFR 30-59), w CHF ([address])    2. Need for influenza vaccination        Well controlled BP   Continue current treatment.        BP Readings from Last 3 Encounters:   12/27/23 110/64   12/06/23 (!) 146/62   08/29/23 132/70       Pulse Readings from Last 3 Encounters:   12/06/23 68   08/29/23 52   06/07/23 (!) 103       Future Appointments   Date Time Provider [address] [place] [address]   3/8/2024 10:40 AM Onur Amin MD [address] [place] [address] -   3/29/2024 11:00 AM Meli Martinez MD [facility]       Electronically signed by Meli Martinez MD on 12/27/23 at 9:45 AM EST

## 2024-01-01 ENCOUNTER — TELEPHONE (OUTPATIENT)
Dept: FAMILY MEDICINE CLINIC | Age: 59
End: 2024-01-01

## 2024-01-10 DIAGNOSIS — I50.42 CHRONIC COMBINED SYSTOLIC AND DIASTOLIC CONGESTIVE HEART FAILURE (HCC): ICD-10-CM

## 2024-01-10 DIAGNOSIS — F33.2 MDD (MAJOR DEPRESSIVE DISORDER), RECURRENT SEVERE, WITHOUT PSYCHOSIS (HCC): ICD-10-CM

## 2024-01-10 DIAGNOSIS — F41.9 ANXIETY: ICD-10-CM

## 2024-01-10 DIAGNOSIS — J44.9 CHRONIC OBSTRUCTIVE PULMONARY DISEASE, UNSPECIFIED COPD TYPE (HCC): ICD-10-CM

## 2024-01-11 RX ORDER — GABAPENTIN 300 MG/1
300 CAPSULE ORAL 3 TIMES DAILY
Qty: 270 CAPSULE | Refills: 3 | Status: SHIPPED | OUTPATIENT
Start: 2024-01-11 | End: 2025-01-05

## 2024-01-11 RX ORDER — VENLAFAXINE HYDROCHLORIDE 150 MG/1
150 CAPSULE, EXTENDED RELEASE ORAL EVERY MORNING
Qty: 90 CAPSULE | Refills: 0 | Status: SHIPPED | OUTPATIENT
Start: 2024-01-11

## 2024-01-11 RX ORDER — AMITRIPTYLINE HYDROCHLORIDE 50 MG/1
50 TABLET, FILM COATED ORAL NIGHTLY
Qty: 90 TABLET | Refills: 3 | Status: SHIPPED | OUTPATIENT
Start: 2024-01-11

## 2024-01-11 RX ORDER — ALBUTEROL SULFATE 90 UG/1
2 AEROSOL, METERED RESPIRATORY (INHALATION) EVERY 6 HOURS PRN
Qty: 8.5 G | Refills: 3 | Status: SHIPPED | OUTPATIENT
Start: 2024-01-11

## 2024-01-11 NOTE — TELEPHONE ENCOUNTER
New prescriptions are needed with [facility] pharmacy as the updated pharmacy;    Pharmacy requesting refill of Gabapentin 300mg.      Medication active on med list yes      Date of last Rx: 5/8/2023 with 3 refills          verified by NATALEE JAMES        Pharmacy requesting refill of amitriptyline 50mg.      Medication active on med list yes      Date of last Rx: 4/7/2023 with 3 refills          verified by NATALEE JAMES      Date of last appointment 4/7/2023    Next Visit Date:  3/8/2024

## 2024-01-11 NOTE — TELEPHONE ENCOUNTER
Please Approve or Refuse.  Send to Pharmacy per Pt's Request:      Next Visit Date:  3/29/2024   Last Visit Date: 12/7/2023    Hemoglobin A1C (%)   Date Value   12/20/2023 9.2 (A)   06/27/2023 8.7 (H)   04/26/2023 8.8             ( goal A1C is < 7)   BP Readings from Last 3 Encounters:   12/27/23 110/64   12/06/23 (!) 146/62   08/29/23 132/70          (goal 120/80)  BUN   Date Value Ref Range Status   12/02/2023 44 (H) 6 - 20 mg/dL Final     Creatinine   Date Value Ref Range Status   12/02/2023 2.0 (H) 0.7 - 1.2 mg/dL Final     Potassium   Date Value Ref Range Status   12/02/2023 3.2 (L) 3.7 - 5.3 mmol/L Final

## 2024-01-11 NOTE — TELEPHONE ENCOUNTER
Contacted patient to clarify the pharmacy as the a year supply was sent of both prescription to the [facility]. Left a message to call back.

## 2024-01-23 DIAGNOSIS — M96.1 POSTLAMINECTOMY SYNDROME OF LUMBAR REGION: ICD-10-CM

## 2024-01-23 DIAGNOSIS — M51.36 DDD (DEGENERATIVE DISC DISEASE), LUMBAR: ICD-10-CM

## 2024-01-23 DIAGNOSIS — M54.42 CHRONIC MIDLINE LOW BACK PAIN WITH LEFT-SIDED SCIATICA: ICD-10-CM

## 2024-01-23 DIAGNOSIS — M48.061 SPINAL STENOSIS OF LUMBAR REGION WITHOUT NEUROGENIC CLAUDICATION: ICD-10-CM

## 2024-01-23 DIAGNOSIS — G89.29 CHRONIC MIDLINE LOW BACK PAIN WITH LEFT-SIDED SCIATICA: ICD-10-CM

## 2024-01-23 RX ORDER — OXYCODONE HYDROCHLORIDE 10 MG/1
10 TABLET ORAL EVERY 8 HOURS PRN
Qty: 90 TABLET | Refills: 0 | Status: SHIPPED | OUTPATIENT
Start: 2024-01-23 | End: 2024-02-22

## 2024-01-23 NOTE — TELEPHONE ENCOUNTER
----- Message from Miguel Alanis Jr. sent at 1/22/2024  5:00 PM EST -----  Regarding: Meds   Contact: [phone]  Can you please refill my pain medication I go to [facility] on [address] [phone] oxyCODONE 10 one every 8 hours

## 2024-02-15 DIAGNOSIS — M54.9 CHRONIC BACK PAIN GREATER THAN 3 MONTHS DURATION: ICD-10-CM

## 2024-02-15 DIAGNOSIS — G89.29 CHRONIC BACK PAIN GREATER THAN 3 MONTHS DURATION: ICD-10-CM

## 2024-02-15 DIAGNOSIS — I50.32 CHRONIC DIASTOLIC CONGESTIVE HEART FAILURE (HCC): ICD-10-CM

## 2024-02-15 DIAGNOSIS — J44.9 MIXED TYPE COPD (CHRONIC OBSTRUCTIVE PULMONARY DISEASE) (HCC): ICD-10-CM

## 2024-02-20 DIAGNOSIS — M54.42 CHRONIC MIDLINE LOW BACK PAIN WITH LEFT-SIDED SCIATICA: ICD-10-CM

## 2024-02-20 DIAGNOSIS — M51.36 DDD (DEGENERATIVE DISC DISEASE), LUMBAR: ICD-10-CM

## 2024-02-20 DIAGNOSIS — M48.061 SPINAL STENOSIS OF LUMBAR REGION WITHOUT NEUROGENIC CLAUDICATION: ICD-10-CM

## 2024-02-20 DIAGNOSIS — G89.29 CHRONIC MIDLINE LOW BACK PAIN WITH LEFT-SIDED SCIATICA: ICD-10-CM

## 2024-02-20 DIAGNOSIS — M96.1 POSTLAMINECTOMY SYNDROME OF LUMBAR REGION: ICD-10-CM

## 2024-02-20 NOTE — TELEPHONE ENCOUNTER
----- Message from Miguel Alanis Jr. sent at 2/20/2024  2:49 PM EST -----  Regarding: Meds   Contact: [phone]  Will you please Refill my pain meds. OxyCODONE.  HCI. 10 MG. TABLET One. Every 8. Hours For PAIN.  [facility]  MAGGIE LACEY [phone] - [phone]  I Will Be out of Them Thursday Thank You

## 2024-02-21 RX ORDER — OXYCODONE HYDROCHLORIDE 10 MG/1
10 TABLET ORAL EVERY 8 HOURS PRN
Qty: 90 TABLET | Refills: 0 | Status: SHIPPED | OUTPATIENT
Start: 2024-02-21 | End: 2024-03-22

## 2024-02-22 ENCOUNTER — E-VISIT (OUTPATIENT)
Dept: FAMILY MEDICINE CLINIC | Age: 59
End: 2024-02-22
Payer: COMMERCIAL

## 2024-02-22 DIAGNOSIS — J40 BRONCHITIS: ICD-10-CM

## 2024-02-22 DIAGNOSIS — R68.89 FLU-LIKE SYMPTOMS: Primary | ICD-10-CM

## 2024-02-22 PROCEDURE — 99423 OL DIG E/M SVC 21+ MIN: CPT | Performed by: FAMILY MEDICINE

## 2024-02-22 RX ORDER — AZITHROMYCIN 250 MG/1
TABLET, FILM COATED ORAL
Qty: 6 TABLET | Refills: 0 | Status: SHIPPED | OUTPATIENT
Start: 2024-02-22 | End: 2024-02-27

## 2024-02-22 RX ORDER — GUAIFENESIN 600 MG/1
600 TABLET, EXTENDED RELEASE ORAL 2 TIMES DAILY
Qty: 30 TABLET | Refills: 0 | Status: SHIPPED | OUTPATIENT
Start: 2024-02-22

## 2024-02-22 RX ORDER — OSELTAMIVIR PHOSPHATE 75 MG/1
75 CAPSULE ORAL 2 TIMES DAILY
Qty: 10 CAPSULE | Refills: 0 | Status: SHIPPED | OUTPATIENT
Start: 2024-02-22 | End: 2024-02-25

## 2024-02-22 RX ORDER — COVID-19 ANTIGEN TEST
KIT MISCELLANEOUS
Qty: 1 KIT | Refills: 0 | Status: SHIPPED | OUTPATIENT
Start: 2024-02-22

## 2024-02-22 RX ORDER — PREDNISONE 10 MG/1
10 TABLET ORAL DAILY
Qty: 10 TABLET | Refills: 0 | Status: SHIPPED | OUTPATIENT
Start: 2024-02-22 | End: 2024-02-24

## 2024-02-22 NOTE — PROGRESS NOTES
Miguel Alanis Jr. (1965) initiated an asynchronous digital communication through [employer].  Date of service: 2/22/2024     HPI: per patient's questionnaire    EXAM: not applicable    Diagnoses and all orders for this visit:    1. Flu-like symptoms    - COVID-19 At Home Antigen Test KIT; USE AT HOME TO TEST  Dispense: 1 kit; Refill: 0  - oseltamivir (TAMIFLU) 75 MG capsule; Take 1 capsule by mouth 2 times daily for 5 days  Dispense: 10 capsule; Refill: 0    2. Bronchitis    - azithromycin (ZITHROMAX) 250 MG tablet; 500 mg orally on day one followed by 250 mg daily on days two through five  Dispense: 6 tablet; Refill: 0  - guaiFENesin (MUCINEX) 600 MG extended release tablet; Take 1 tablet by mouth 2 times daily  Dispense: 30 tablet; Refill: 0  - predniSONE (DELTASONE) 10 MG tablet; Take 1 tablet by mouth daily for 10 days  Dispense: 10 tablet; Refill: 0      No orders of the defined types were placed in this encounter.        Patient was advised to contact PCP if symptoms worsen or failing to change as expected      Time: EV3 - 21 or more minutes were spent on the digital evaluation and management of this patient.    Electronically signed by Marzena Weber MD on 2/22/24 at 11:02 AM.

## 2024-02-24 ENCOUNTER — APPOINTMENT (OUTPATIENT)
Dept: GENERAL RADIOLOGY | Age: 59
End: 2024-02-24
Payer: COMMERCIAL

## 2024-02-24 ENCOUNTER — HOSPITAL ENCOUNTER (EMERGENCY)
Age: 59
Discharge: HOME | End: 2024-02-24
Attending: EMERGENCY MEDICINE
Payer: COMMERCIAL

## 2024-02-24 VITALS
DIASTOLIC BLOOD PRESSURE: 44 MMHG | HEART RATE: 96 BPM | SYSTOLIC BLOOD PRESSURE: 92 MMHG | OXYGEN SATURATION: 92 % | RESPIRATION RATE: 15 BRPM | WEIGHT: 315 LBS | TEMPERATURE: 98.8 F | HEIGHT: 72 IN | BODY MASS INDEX: 42.66 KG/M2

## 2024-02-24 DIAGNOSIS — J44.1 COPD EXACERBATION (HCC): Primary | ICD-10-CM

## 2024-02-24 LAB
ALBUMIN SERPL-MCNC: 4.1 G/DL (ref 3.5–5.2)
ALP SERPL-CCNC: 91 U/L (ref 40–129)
ALT SERPL-CCNC: 23 U/L (ref 5–41)
ANION GAP SERPL CALCULATED.3IONS-SCNC: 14 MMOL/L (ref 9–17)
ARTERIAL PATENCY WRIST A: ABNORMAL
AST SERPL-CCNC: 42 U/L
BASOPHILS # BLD: 0 K/UL (ref 0–0.2)
BASOPHILS NFR BLD: 0 % (ref 0–2)
BILIRUB SERPL-MCNC: 0.6 MG/DL (ref 0.3–1.2)
BODY TEMPERATURE: 37
BUN SERPL-MCNC: 50 MG/DL (ref 6–20)
CALCIUM SERPL-MCNC: 9.9 MG/DL (ref 8.6–10.4)
CHLORIDE SERPL-SCNC: 88 MMOL/L (ref 98–107)
CO2 SERPL-SCNC: 32 MMOL/L (ref 20–31)
COHGB MFR BLD: 3.1 % (ref 0–5)
CREAT SERPL-MCNC: 2.4 MG/DL (ref 0.7–1.2)
EKG ATRIAL RATE: 94 BPM
EKG P-R INTERVAL: 180 MS
EKG Q-T INTERVAL: 416 MS
EKG QRS DURATION: 178 MS
EKG QTC CALCULATION (BAZETT): 520 MS
EKG R AXIS: 92 DEGREES
EKG T AXIS: -49 DEGREES
EKG VENTRICULAR RATE: 94 BPM
EOSINOPHIL # BLD: 0 K/UL (ref 0–0.4)
EOSINOPHILS RELATIVE PERCENT: 0 % (ref 0–4)
ERYTHROCYTE [DISTWIDTH] IN BLOOD BY AUTOMATED COUNT: 16.8 % (ref 11.5–14.9)
FLUAV RNA RESP QL NAA+PROBE: NOT DETECTED
FLUBV RNA RESP QL NAA+PROBE: NOT DETECTED
GAS FLOW.O2 O2 DELIVERY SYS: ABNORMAL L/MIN
GFR SERPL CREATININE-BSD FRML MDRD: 30 ML/MIN/1.73M2
GLUCOSE SERPL-MCNC: 91 MG/DL (ref 70–99)
HCO3 VENOUS: 35.4 MMOL/L (ref 24–30)
HCT VFR BLD AUTO: 34.7 % (ref 41–53)
HGB BLD-MCNC: 11 G/DL (ref 13.5–17.5)
INR PPP: 1.1
LYMPHOCYTES NFR BLD: 0.36 K/UL (ref 1–4.8)
LYMPHOCYTES RELATIVE PERCENT: 3 % (ref 24–44)
MAGNESIUM SERPL-MCNC: 2.5 MG/DL (ref 1.6–2.6)
MCH RBC QN AUTO: 27.3 PG (ref 26–34)
MCHC RBC AUTO-ENTMCNC: 31.9 G/DL (ref 31–37)
MCV RBC AUTO: 85.7 FL (ref 80–100)
METHEMOGLOBIN: 0.9 % (ref 0–1.9)
MONOCYTES NFR BLD: 0.36 K/UL (ref 0.1–1.3)
MONOCYTES NFR BLD: 3 % (ref 1–7)
MORPHOLOGY: ABNORMAL
NEUTROPHILS NFR BLD: 94 % (ref 36–66)
NEUTS SEG NFR BLD: 11.28 K/UL (ref 1.3–9.1)
O2 SAT, VEN: 75.8 % (ref 60–85)
PCO2, VEN: 66.2 MM HG (ref 39–55)
PH VENOUS: 7.34 (ref 7.32–7.42)
PLATELET # BLD AUTO: 219 K/UL (ref 150–450)
PMV BLD AUTO: 7.9 FL (ref 6–12)
PO2, VEN: 48.3 MM HG (ref 30–50)
POSITIVE BASE EXCESS, VEN: 9.6 MMOL/L (ref 0–2)
POTASSIUM SERPL-SCNC: 2.7 MMOL/L (ref 3.7–5.3)
PROT SERPL-MCNC: 8 G/DL (ref 6.4–8.3)
PROTHROMBIN TIME: 14.4 SEC (ref 11.8–14.6)
RBC # BLD AUTO: 4.05 M/UL (ref 4.5–5.9)
RESPIRATORY RATE: 22
SARS-COV-2 RNA RESP QL NAA+PROBE: NOT DETECTED
SODIUM SERPL-SCNC: 134 MMOL/L (ref 135–144)
SOURCE: NORMAL
SPECIMEN DESCRIPTION: NORMAL
TEXT FOR RESPIRATORY: ABNORMAL
TROPONIN I SERPL HS-MCNC: 70 NG/L (ref 0–22)
TROPONIN I SERPL HS-MCNC: 74 NG/L (ref 0–22)
WBC OTHER # BLD: 12 K/UL (ref 3.5–11)

## 2024-02-24 PROCEDURE — 82805 BLOOD GASES W/O2 SATURATION: CPT

## 2024-02-24 PROCEDURE — 2700000000 HC OXYGEN THERAPY PER DAY

## 2024-02-24 PROCEDURE — 96365 THER/PROPH/DIAG IV INF INIT: CPT

## 2024-02-24 PROCEDURE — 6360000002 HC RX W HCPCS: Performed by: EMERGENCY MEDICINE

## 2024-02-24 PROCEDURE — 71045 X-RAY EXAM CHEST 1 VIEW: CPT

## 2024-02-24 PROCEDURE — 6370000000 HC RX 637 (ALT 250 FOR IP): Performed by: EMERGENCY MEDICINE

## 2024-02-24 PROCEDURE — 2580000003 HC RX 258: Performed by: EMERGENCY MEDICINE

## 2024-02-24 PROCEDURE — 36415 COLL VENOUS BLD VENIPUNCTURE: CPT

## 2024-02-24 PROCEDURE — 94664 DEMO&/EVAL PT USE INHALER: CPT

## 2024-02-24 PROCEDURE — 83735 ASSAY OF MAGNESIUM: CPT

## 2024-02-24 PROCEDURE — 94761 N-INVAS EAR/PLS OXIMETRY MLT: CPT

## 2024-02-24 PROCEDURE — 85025 COMPLETE CBC W/AUTO DIFF WBC: CPT

## 2024-02-24 PROCEDURE — 94640 AIRWAY INHALATION TREATMENT: CPT

## 2024-02-24 PROCEDURE — 85610 PROTHROMBIN TIME: CPT

## 2024-02-24 PROCEDURE — 84484 ASSAY OF TROPONIN QUANT: CPT

## 2024-02-24 PROCEDURE — 96366 THER/PROPH/DIAG IV INF ADDON: CPT

## 2024-02-24 PROCEDURE — 99285 EMERGENCY DEPT VISIT HI MDM: CPT

## 2024-02-24 PROCEDURE — 96375 TX/PRO/DX INJ NEW DRUG ADDON: CPT

## 2024-02-24 PROCEDURE — 82800 BLOOD PH: CPT

## 2024-02-24 PROCEDURE — 80053 COMPREHEN METABOLIC PANEL: CPT

## 2024-02-24 PROCEDURE — 87636 SARSCOV2 & INF A&B AMP PRB: CPT

## 2024-02-24 RX ORDER — ALBUTEROL SULFATE 2.5 MG/3ML
2.5 SOLUTION RESPIRATORY (INHALATION)
Status: ACTIVE | OUTPATIENT
Start: 2024-02-24 | End: 2024-02-24

## 2024-02-24 RX ORDER — POTASSIUM CHLORIDE 7.45 MG/ML
10 INJECTION INTRAVENOUS ONCE
Status: COMPLETED | OUTPATIENT
Start: 2024-02-24 | End: 2024-02-24

## 2024-02-24 RX ORDER — IPRATROPIUM BROMIDE AND ALBUTEROL SULFATE 2.5; .5 MG/3ML; MG/3ML
1 SOLUTION RESPIRATORY (INHALATION) ONCE
Status: COMPLETED | OUTPATIENT
Start: 2024-02-24 | End: 2024-02-24

## 2024-02-24 RX ORDER — PREDNISONE 10 MG/1
TABLET ORAL
Qty: 20 TABLET | Refills: 0 | Status: SHIPPED | OUTPATIENT
Start: 2024-02-25 | End: 2024-03-01

## 2024-02-24 RX ADMIN — IPRATROPIUM BROMIDE AND ALBUTEROL SULFATE 1 DOSE: 2.5; .5 SOLUTION RESPIRATORY (INHALATION) at 13:01

## 2024-02-24 RX ADMIN — POTASSIUM CHLORIDE 10 MEQ: 7.46 INJECTION, SOLUTION INTRAVENOUS at 13:14

## 2024-02-24 RX ADMIN — WATER 125 MG: 1 INJECTION INTRAMUSCULAR; INTRAVENOUS; SUBCUTANEOUS at 12:31

## 2024-02-24 ASSESSMENT — ENCOUNTER SYMPTOMS
COUGH: 1
RHINORRHEA: 0
VOMITING: 0
SHORTNESS OF BREATH: 1
DIARRHEA: 0
WHEEZING: 1

## 2024-02-24 ASSESSMENT — PAIN DESCRIPTION - LOCATION
LOCATION: BACK;CHEST
LOCATION: BACK
LOCATION: CHEST

## 2024-02-24 ASSESSMENT — LIFESTYLE VARIABLES
HOW OFTEN DO YOU HAVE A DRINK CONTAINING ALCOHOL: NEVER
HOW MANY STANDARD DRINKS CONTAINING ALCOHOL DO YOU HAVE ON A TYPICAL DAY: PATIENT DOES NOT DRINK

## 2024-02-24 ASSESSMENT — PAIN SCALES - GENERAL
PAINLEVEL_OUTOF10: 6
PAINLEVEL_OUTOF10: 6

## 2024-02-24 ASSESSMENT — PAIN DESCRIPTION - FREQUENCY: FREQUENCY: CONTINUOUS

## 2024-02-24 ASSESSMENT — PAIN DESCRIPTION - DESCRIPTORS: DESCRIPTORS: HEAVINESS

## 2024-02-24 ASSESSMENT — PAIN - FUNCTIONAL ASSESSMENT
PAIN_FUNCTIONAL_ASSESSMENT: 0-10
PAIN_FUNCTIONAL_ASSESSMENT: 0-10
PAIN_FUNCTIONAL_ASSESSMENT: PREVENTS OR INTERFERES SOME ACTIVE ACTIVITIES AND ADLS

## 2024-02-24 ASSESSMENT — PAIN DESCRIPTION - PAIN TYPE: TYPE: CHRONIC PAIN

## 2024-02-24 ASSESSMENT — PAIN DESCRIPTION - ONSET: ONSET: ON-GOING

## 2024-02-24 ASSESSMENT — PAIN DESCRIPTION - ORIENTATION: ORIENTATION: MID

## 2024-02-24 NOTE — ED PROVIDER NOTES
[facility] ED  EMERGENCY DEPARTMENT ENCOUNTER      Pt Name: Miguel Alanis Jr.  MRN: [id number]  Birthdate 1965  Date of evaluation: 2/24/24      CHIEF COMPLAINT     SOB      HISTORY OF PRESENT ILLNESS   HPI 58 y.o. male presents with c/o sob, cough, wheezing.  Pt resports symptoms progressively worsening over the last 3 days. PCP prescribed medicaitons for bronchitis but had problems with pharmacy and he wans't able to get the medicaitons.   He's been using his rescue inhaler 4 to 5 tmes a day. On 4L O2 chronically.     REVIEW OF SYSTEMS       Review of Systems   Constitutional:  Positive for fatigue. Negative for fever.   HENT:  Negative for congestion and rhinorrhea.    Respiratory:  Positive for cough, shortness of breath and wheezing.    Cardiovascular:  Positive for chest pain.   Gastrointestinal:  Negative for diarrhea and vomiting.   Skin:  Negative for rash.   Neurological:  Negative for headaches.       PAST MEDICAL HISTORY     Past Medical History:   Diagnosis Date    Acute kidney injury superimposed on CKD ([facility]) 04/10/2013    Acute malignant otitis externa of right ear 7/24/2020    Acute on chronic congestive heart failure ([facility])     Acute on chronic kidney failure (HCC) 07/20/2017    Acute on chronic respiratory failure ([facility]) 10/02/2018    Acute on chronic respiratory failure with hypoxia ([facility]) 09/30/2021    Adhesive capsulitis of left shoulder 03/25/2017    Anemia, normocytic normochromic 09/12/2021    Anxiety 10/02/2016    smokes marijuana for this    Arthropathy, unspecified, other specified sites 06/13/2013    Asthma     B12 deficiency     Bilateral lower leg cellulitis 02/17/2016    Blood in stool     CAD (coronary artery disease)     Cardiovascular stress test abnormal 2018    Cellulitis of both lower extremities 05/25/2017    Cellulitis of leg, left 07/20/2017    CHF (congestive heart failure), NYHA class III ([facility]) 08/14/2013    Chronic back pain     Chronic bronchitis ([facility])     Chronic

## 2024-02-24 NOTE — ED TRIAGE NOTES
Mode of arrival (squad #, walk in, police, etc) : walk in        Chief complaint(s): SOB, CP, Fatigue/weakness        Arrival Note (brief scenario, treatment PTA, etc).: Pt reports he has not been feeling well over a couple of days now having SOB, cp and extreme weakness. Pt is A&Ox4.        C= \"Have you ever felt that you should Cut down on your drinking?\"  No  A= \"Have people Annoyed you by criticizing your drinking?\"  No  G= \"Have you ever felt bad or Guilty about your drinking?\"  No  E= \"Have you ever had a drink as an Eye-opener first thing in the morning to steady your nerves or to help a hangover?\"  No      Deferred []      Reason for deferring: N/A    *If yes to two or more: probable alcohol abuse.*

## 2024-02-24 NOTE — ED NOTES
Pt discharged in stable condition with prescriptions and dc instructions. Pt taken per w/c to door with daughter and wife.

## 2024-02-25 RX ORDER — OSELTAMIVIR PHOSPHATE 75 MG/1
75 CAPSULE ORAL 2 TIMES DAILY
Qty: 10 CAPSULE | Refills: 0 | Status: SHIPPED | OUTPATIENT
Start: 2024-02-25 | End: 2024-03-01

## 2024-02-26 LAB
EKG ATRIAL RATE: 94 BPM
EKG P-R INTERVAL: 180 MS
EKG Q-T INTERVAL: 416 MS
EKG QRS DURATION: 178 MS
EKG QTC CALCULATION (BAZETT): 520 MS
EKG R AXIS: 92 DEGREES
EKG T AXIS: -49 DEGREES
EKG VENTRICULAR RATE: 94 BPM

## 2024-02-26 NOTE — TELEPHONE ENCOUNTER
[facility] ED Follow up Call    Reason for ED visit:  CHEST PAIN, FATIGUE,COUGH, RESPIRATORY DISTRESS           FU appts/Provider:    No future appointments.    VOICEMAIL DOCUMENTATION - ERASE IF NOT USED  Hi, this message is for  WILIAN   This is GABE from [facility] office. Just calling to see how you are doing after your recent visit to the Emergency Room. [facility] wants to make sure you were able to fill any prescriptions and that you understand your discharge instructions. Please return our call if you need to make a follow up appointment with your provider or have any further needs.   Our phone number is [phone]   Have a great day.

## 2024-02-26 NOTE — TELEPHONE ENCOUNTER
Called received from Harpreet at [facility]  [place] stating patient recently passed away and is requesting PCP to sign off on the death certificate.    Date: 24  Time: 19:13 pm  Cause of Death:  at home, uncertain actual cause    Harpreet from [facility] will drop off tomorrow

## 2024-07-27 NOTE — TELEPHONE ENCOUNTER
RX for blood glucose test strips along with 3 refills sent to [facility] on [address]. Yesenia Rizvi RPH,Pharm. D,, [facility], [facility]  11/1/2021  4:21 PM
Negative
